# Patient Record
Sex: FEMALE | Race: WHITE | NOT HISPANIC OR LATINO | Employment: OTHER | URBAN - METROPOLITAN AREA
[De-identification: names, ages, dates, MRNs, and addresses within clinical notes are randomized per-mention and may not be internally consistent; named-entity substitution may affect disease eponyms.]

---

## 2021-01-06 ENCOUNTER — TRANSCRIBE ORDERS (OUTPATIENT)
Dept: DIABETES SERVICES | Facility: HOSPITAL | Age: 81
End: 2021-01-06

## 2021-01-06 DIAGNOSIS — E11.8 TYPE 2 DIABETES MELLITUS WITH UNSPECIFIED COMPLICATIONS (HCC): Primary | ICD-10-CM

## 2021-01-27 ENCOUNTER — TRANSCRIBE ORDERS (OUTPATIENT)
Dept: DIABETES SERVICES | Facility: HOSPITAL | Age: 81
End: 2021-01-27

## 2021-01-27 DIAGNOSIS — E11.8 TYPE II DIABETES MELLITUS WITH COMPLICATION (HCC): Primary | ICD-10-CM

## 2021-01-28 ENCOUNTER — IMMUNIZATION (OUTPATIENT)
Dept: VACCINE CLINIC | Facility: HOSPITAL | Age: 81
End: 2021-01-28

## 2021-01-28 PROCEDURE — 0001A: CPT | Performed by: INTERNAL MEDICINE

## 2021-01-28 PROCEDURE — 91300 HC SARSCOV02 VAC 30MCG/0.3ML IM: CPT | Performed by: INTERNAL MEDICINE

## 2021-02-10 ENCOUNTER — APPOINTMENT (OUTPATIENT)
Dept: DIABETES SERVICES | Facility: HOSPITAL | Age: 81
End: 2021-02-10

## 2021-02-18 ENCOUNTER — IMMUNIZATION (OUTPATIENT)
Dept: VACCINE CLINIC | Facility: HOSPITAL | Age: 81
End: 2021-02-18

## 2021-02-18 PROCEDURE — 91300 HC SARSCOV02 VAC 30MCG/0.3ML IM: CPT | Performed by: INTERNAL MEDICINE

## 2021-02-18 PROCEDURE — 0002A: CPT | Performed by: INTERNAL MEDICINE

## 2021-02-25 ENCOUNTER — HOSPITAL ENCOUNTER (OUTPATIENT)
Dept: DIABETES SERVICES | Facility: HOSPITAL | Age: 81
Setting detail: RECURRING SERIES
Discharge: HOME | End: 2021-02-25

## 2021-02-25 NOTE — CONSULTS
DIABETES EDUCATION CONSULT, 60 minutes (phone)  This medical referred consult was provided as a telephone call, tele-health or e-visit, as patient is unable to attend an in-office appointment due to the COVID-19 crisis. Consent for treatment was given verbally.Please see media tab for assessment and notes if you use EP[employer]. If you are not an EP[employer] user a copy of patient's assessment and notes will be sent per routine. Thank you.

## 2021-03-26 ENCOUNTER — HOSPITAL ENCOUNTER (OUTPATIENT)
Dept: DIABETES SERVICES | Facility: HOSPITAL | Age: 81
Setting detail: RECURRING SERIES
Discharge: HOME | End: 2021-03-26

## 2021-03-26 NOTE — CONSULTS
Ms. Anderson attended a 20 minute diabetes follow up class today via telephone.  E[employer] users see Media tab.  All others will receive notes per usual.  Thank you.

## 2021-04-02 ENCOUNTER — TRANSCRIBE ORDERS (OUTPATIENT)
Dept: ADMINISTRATIVE | Facility: HOSPITAL | Age: 81
End: 2021-04-02

## 2021-04-02 ENCOUNTER — LAB (OUTPATIENT)
Dept: LAB | Facility: HOSPITAL | Age: 81
End: 2021-04-02

## 2021-04-02 ENCOUNTER — TRANSCRIBE ORDERS (OUTPATIENT)
Dept: LAB | Facility: HOSPITAL | Age: 81
End: 2021-04-02

## 2021-04-02 DIAGNOSIS — E11.621 DIABETIC FOOT ULCER WITH OSTEOMYELITIS (HCC): ICD-10-CM

## 2021-04-02 DIAGNOSIS — Z71.89 OTHER PARENT-CHILD PROBLEMS: ICD-10-CM

## 2021-04-02 DIAGNOSIS — E11.69 DIABETIC FOOT ULCER WITH OSTEOMYELITIS (HCC): ICD-10-CM

## 2021-04-02 DIAGNOSIS — M86.9 DIABETIC FOOT ULCER WITH OSTEOMYELITIS (HCC): ICD-10-CM

## 2021-04-02 DIAGNOSIS — L03.115 CELLULITIS OF RIGHT FOOT: Primary | ICD-10-CM

## 2021-04-02 DIAGNOSIS — M86.171 ACUTE OSTEOMYELITIS OF RIGHT ANKLE OR FOOT (HCC): ICD-10-CM

## 2021-04-02 DIAGNOSIS — L97.412 ULCER OF RIGHT HEEL, WITH FAT LAYER EXPOSED (HCC): ICD-10-CM

## 2021-04-02 DIAGNOSIS — L03.031 ABSCESS OF FIFTH TOENAIL OF RIGHT FOOT: Primary | ICD-10-CM

## 2021-04-02 DIAGNOSIS — I73.9 PERIPHERAL VASCULAR DISEASE, UNSPECIFIED (HCC): ICD-10-CM

## 2021-04-02 DIAGNOSIS — L97.509 DIABETIC FOOT ULCER WITH OSTEOMYELITIS (HCC): ICD-10-CM

## 2021-04-02 DIAGNOSIS — L03.115 CELLULITIS OF RIGHT FOOT: ICD-10-CM

## 2021-04-02 DIAGNOSIS — L03.031 ABSCESS OF FIFTH TOENAIL OF RIGHT FOOT: ICD-10-CM

## 2021-04-02 DIAGNOSIS — Z62.820 OTHER PARENT-CHILD PROBLEMS: ICD-10-CM

## 2021-04-02 DIAGNOSIS — E11.42 DIABETIC POLYNEUROPATHY ASSOCIATED WITH TYPE 2 DIABETES MELLITUS (HCC): ICD-10-CM

## 2021-04-02 LAB
ALBUMIN SERPL-MCNC: 3.1 G/DL (ref 3.5–5.2)
ALBUMIN/GLOB SERPL: 1 G/DL
ALP SERPL-CCNC: 100 U/L (ref 39–117)
ALT SERPL W P-5'-P-CCNC: 36 U/L (ref 1–33)
ANION GAP SERPL CALCULATED.3IONS-SCNC: 10 MMOL/L (ref 5–15)
AST SERPL-CCNC: 33 U/L (ref 1–32)
BASOPHILS # BLD AUTO: 0.04 10*3/MM3 (ref 0–0.2)
BASOPHILS NFR BLD AUTO: 0.4 % (ref 0–1.5)
BILIRUB SERPL-MCNC: 0.4 MG/DL (ref 0–1.2)
BUN SERPL-MCNC: 50 MG/DL (ref 8–23)
BUN/CREAT SERPL: 37.3 (ref 7–25)
CALCIUM SPEC-SCNC: 8.2 MG/DL (ref 8.6–10.5)
CHLORIDE SERPL-SCNC: 103 MMOL/L (ref 98–107)
CK SERPL-CCNC: 835 U/L (ref 20–180)
CO2 SERPL-SCNC: 21 MMOL/L (ref 22–29)
CREAT SERPL-MCNC: 1.34 MG/DL (ref 0.57–1)
CRP SERPL-MCNC: 17.83 MG/DL (ref 0–0.5)
DEPRECATED RDW RBC AUTO: 49.4 FL (ref 37–54)
EOSINOPHIL # BLD AUTO: 0.02 10*3/MM3 (ref 0–0.4)
EOSINOPHIL NFR BLD AUTO: 0.2 % (ref 0.3–6.2)
ERYTHROCYTE [DISTWIDTH] IN BLOOD BY AUTOMATED COUNT: 13.8 % (ref 12.3–15.4)
ERYTHROCYTE [SEDIMENTATION RATE] IN BLOOD: 40 MM/HR (ref 0–30)
GFR SERPL CREATININE-BSD FRML MDRD: 38 ML/MIN/1.73
GLOBULIN UR ELPH-MCNC: 3 GM/DL
GLUCOSE SERPL-MCNC: 324 MG/DL (ref 65–99)
HCT VFR BLD AUTO: 33.9 % (ref 34–46.6)
HGB BLD-MCNC: 10.9 G/DL (ref 12–15.9)
IMM GRANULOCYTES # BLD AUTO: 0.04 10*3/MM3 (ref 0–0.05)
IMM GRANULOCYTES NFR BLD AUTO: 0.4 % (ref 0–0.5)
LYMPHOCYTES # BLD AUTO: 1.31 10*3/MM3 (ref 0.7–3.1)
LYMPHOCYTES NFR BLD AUTO: 11.5 % (ref 19.6–45.3)
MCH RBC QN AUTO: 31.2 PG (ref 26.6–33)
MCHC RBC AUTO-ENTMCNC: 32.2 G/DL (ref 31.5–35.7)
MCV RBC AUTO: 97.1 FL (ref 79–97)
MONOCYTES # BLD AUTO: 1.07 10*3/MM3 (ref 0.1–0.9)
MONOCYTES NFR BLD AUTO: 9.4 % (ref 5–12)
NEUTROPHILS NFR BLD AUTO: 78.1 % (ref 42.7–76)
NEUTROPHILS NFR BLD AUTO: 8.93 10*3/MM3 (ref 1.7–7)
NRBC BLD AUTO-RTO: 0 /100 WBC (ref 0–0.2)
PLATELET # BLD AUTO: 202 10*3/MM3 (ref 140–450)
PMV BLD AUTO: 10.9 FL (ref 6–12)
POTASSIUM SERPL-SCNC: 4.9 MMOL/L (ref 3.5–5.2)
PROT SERPL-MCNC: 6.1 G/DL (ref 6–8.5)
RBC # BLD AUTO: 3.49 10*6/MM3 (ref 3.77–5.28)
SODIUM SERPL-SCNC: 134 MMOL/L (ref 136–145)
WBC # BLD AUTO: 11.41 10*3/MM3 (ref 3.4–10.8)

## 2021-04-02 PROCEDURE — 87070 CULTURE OTHR SPECIMN AEROBIC: CPT

## 2021-04-02 PROCEDURE — 87205 SMEAR GRAM STAIN: CPT

## 2021-04-02 PROCEDURE — 85025 COMPLETE CBC W/AUTO DIFF WBC: CPT

## 2021-04-02 PROCEDURE — 87147 CULTURE TYPE IMMUNOLOGIC: CPT

## 2021-04-02 PROCEDURE — 80053 COMPREHEN METABOLIC PANEL: CPT

## 2021-04-02 PROCEDURE — 86140 C-REACTIVE PROTEIN: CPT

## 2021-04-02 PROCEDURE — 82550 ASSAY OF CK (CPK): CPT

## 2021-04-02 PROCEDURE — 85652 RBC SED RATE AUTOMATED: CPT

## 2021-04-02 PROCEDURE — 36415 COLL VENOUS BLD VENIPUNCTURE: CPT

## 2021-04-03 ENCOUNTER — HOSPITAL ENCOUNTER (OUTPATIENT)
Dept: MRI IMAGING | Facility: HOSPITAL | Age: 81
Discharge: HOME | End: 2021-04-03
Admitting: INTERNAL MEDICINE

## 2021-04-03 DIAGNOSIS — L03.115 CELLULITIS OF RIGHT FOOT: ICD-10-CM

## 2021-04-03 PROCEDURE — 73720 MRI LWR EXTREMITY W/O&W/DYE: CPT

## 2021-04-03 PROCEDURE — A9577 INJ MULTIHANCE: HCPCS | Performed by: INTERNAL MEDICINE

## 2021-04-03 PROCEDURE — 0 GADOBENATE DIMEGLUMINE 529 MG/ML SOLUTION: Performed by: INTERNAL MEDICINE

## 2021-04-03 RX ADMIN — GADOBENATE DIMEGLUMINE 15 ML: 529 INJECTION, SOLUTION INTRAVENOUS at 12:08

## 2021-04-04 LAB
BACTERIA SPEC AEROBE CULT: ABNORMAL
BACTERIA SPEC AEROBE CULT: ABNORMAL
GRAM STN SPEC: ABNORMAL
GRAM STN SPEC: ABNORMAL
STREP GROUPING: ABNORMAL

## 2021-04-05 ENCOUNTER — HOSPITAL ENCOUNTER (OUTPATIENT)
Dept: PHYSICAL THERAPY | Facility: HOSPITAL | Age: 81
Setting detail: THERAPIES SERIES
Discharge: HOME | End: 2021-04-05

## 2021-04-05 ENCOUNTER — TRANSCRIBE ORDERS (OUTPATIENT)
Dept: PHYSICAL THERAPY | Facility: HOSPITAL | Age: 81
End: 2021-04-05

## 2021-04-05 DIAGNOSIS — L03.115 CELLULITIS OF RIGHT LOWER EXTREMITY: Primary | ICD-10-CM

## 2021-04-05 DIAGNOSIS — S91.301D OPEN WOUND OF RIGHT FOOT WITH COMPLICATION, SUBSEQUENT ENCOUNTER: Primary | ICD-10-CM

## 2021-04-05 PROCEDURE — 97597 DBRDMT OPN WND 1ST 20 CM/<: CPT

## 2021-04-05 PROCEDURE — 29580 STRAPPING UNNA BOOT: CPT

## 2021-04-05 PROCEDURE — 97162 PT EVAL MOD COMPLEX 30 MIN: CPT

## 2021-04-05 NOTE — THERAPY EVALUATION
Outpatient Rehabilitation - Wound/Debridement Initial Eval  [facility]     Patient Name: Susan Anderson  : 1940  MRN: [id number]  Today's Date: 2021         R first metatarsal head:             Admit Date: 2021    Visit Dx:    ICD-10-CM ICD-9-CM   1. Open wound of right foot with complication, subsequent encounter  S91.301D V58.89     892.1       There is no problem list on file for this patient.       No past medical history on file.     No past surgical history on file.    Patient History     Row Name 21 1430             History    Chief Complaint  Ulcer, wound or other skin conditions  -MP      Date Current Problem(s) Began  03/15/21  -MP      Brief Description of Current Complaint  Patient arrives to OP wound care with wound on R first met head that occured on 3/15/2021 from a skin tear. Patient states she has been on multiple oral abx but is currently being managed by [facility] for daily abx infusions. Patient states that MRI results potential bone involvement.  -MP      Previous treatment for THIS PROBLEM  Medication;Other (comment) oral and IV abx  -MP      Patient/Caregiver Goals  Heal wound  -MP      Patient's Rating of General Health  Fair  -MP      Patient seeing anyone else for problem(s)?  [facility]- Dr. Pete  -MP      What clinical tests have you had for this problem?  MRI  -MP      Related/Recent Hospitalizations  No  -MP         Pain     Pain at Present  0  -MP         Services    Are you currently receiving Home Health services  No  -MP      Do you plan to receive Home Health services in the near future  No  -MP         Daily Activities    Primary Language  English  -MP      Pt Participated in POC and Goals  Yes  -MP        User Key  (r) = Recorded By, (t) = Taken By, (c) = Cosigned By    Initials Name Provider Type    Adalid Dao PT Physical Therapist          EVALUATION  PT Ortho     Row Name 21 3405       Subjective Comments    Subjective Comments  Patient's  daughter states that she can help with dressing management if needed. Pt states she is [place].  -MP       Subjective Pain    Able to rate subjective pain?  yes  -MP    Pre-Treatment Pain Level  0  -MP    Post-Treatment Pain Level  0  -MP    Subjective Pain Comment  DPN  -MP       Transfers    Comment (Transfers)  remained seated in w/c  -MP      User Key  (r) = Recorded By, (t) = Taken By, (c) = Cosigned By    Initials Name Provider Type    Adalid Dao PT Physical Therapist        LDA Wound     Row Name 04/05/21 1430             Wound 04/05/21 1430 Right medial great toe Diabetic Ulcer    Wound - Properties Group Placement Date: 04/05/21  -MP Placement Time: 1430  -MP Present on Hospital Admission: Y  -MP Side: Right  -MP Orientation: medial  -MP Location: great toe  -MP Primary Wound Type: Diabetic ulc  -MP    Wound Image  Images linked: 2  -MP      Dressing Appearance  intact;moist drainage;other (see comments) arrived with 4'' optifoam covering  -MP      Base  moist;pink;red;yellow;slough;black eschar  -MP      Periwound  pink;redness;other (see comments) moderate erythema  -MP      Periwound Temperature  warm  -MP      Periwound Skin Turgor  firm  -MP      Edges  callused;open  -MP      Wound Length (cm)  2 cm  -MP      Wound Width (cm)  1.9 cm  -MP      Wound Depth (cm)  -- obscured  -MP      Drainage Characteristics/Odor  serosanguineous  -MP      Drainage Amount  scant  -MP      Care, Wound  cleansed with;wound cleanser;debrided  -MP      Dressing Care  dressing applied;antimicrobial agent applied;multi-layer wrap Therahoney, HFBt, unna boot, kerlix, spandage  -MP      Periwound Care  cleansed with pH balanced cleanser;dry periwound area maintained  -MP      Retired Wound - Properties Group Date first assessed: 04/05/21  -MP Time first assessed: 1430  -MP Present on Hospital Admission: Y  -MP Side: Right  -MP Location: great toe  -MP Primary Wound Type: Diabetic ulc  -MP      User Key  (r) = Recorded By,  (t) = Taken By, (c) = Cosigned By    Initials Name Provider Type    Adalid Dao, PT Physical Therapist        Lymphedema     Row Name 04/05/21 1430             Lymphedema Edema Assessment    Ptting Edema Category  By severity  -MP      Pitting Edema  Mild  -MP         Skin Changes/Observations    Location/Assessment  Lower Extremity  -MP      Lower Extremity Conditions  right:;inflamed;other (comment) R dorsal foot erythema/inflammation noted  -MP         Lymphedema Pulses/Capillary Refill    Lymphedema Pulses/Capillary Refill  lower extremity pulses;capillary refill  -MP      Dorsalis Pedis Pulse  right: difficult to palpate through edema  -MP      Posterior Tibialis Pulse  right: difficult to palpate through edema  -MP      Capillary Refill  lower extremity capillary refill  -MP      Lower Extremity Capillary Refill  right:;less than 3 seconds  -MP         Compression/Skin Care    Compression/Skin Care  skin care;wrapping location;bandaging  -MP      Skin Care  washed/dried  -MP      Wrapping Location  lower extremity  -MP      Wrapping Location LE  right:;foot to knee  -MP      Wrapping Comments  Wound dressings, unna boot, kerlix, size 5 spandage  -MP        User Key  (r) = Recorded By, (t) = Taken By, (c) = Cosigned By    Initials Name Provider Type    Adalid Dao, PT Physical Therapist          WOUND DEBRIDEMENT  Total area of Debridement: 2 cm2  Debridement Site 1  Location- Site 1: R first metatarsal head  Selective Debridement- Site 1: Wound Surface <20cmsq  Instruments- Site 1: #15, scapel, tweezers  Excised Tissue Description- Site 1: minimum, slough, eschar, other (comment) (periwound callus)             Therapy Education     Row Name 04/05/21 1430             Therapy Education    Education Details  Patient and daughter provided information about dressing choice/rationale and importance of keeping wound dressings C/D/I until next appt. PT reinforced importance of NWB R LE and R LE elevation.   -MP      Given  Symptoms/condition management;Bandaging/dressing change  -MP      Program  New  -MP      How Provided  Verbal;Demonstration  -MP      Provided to  Patient;Other (comment) Daughter  -MP        User Key  (r) = Recorded By, (t) = Taken By, (c) = Cosigned By    Initials Name Provider Type    Adalid Dao, PT Physical Therapist          Recommendation and Plan  PT Assessment/Plan     Row Name 21 1430          PT Assessment    Functional Limitations  Impaired locomotion;Other (comment) wound and edema management  -MP     Impairments  Pain;Integumentary integrity  -MP     Assessment Comments  Patient presents to OP wound care with complex wound to R first metatarsal head with R foot cellulitis. Per discussion with MD, MRI showed potential bone involvement. PT was able to debride minimal amount of slough and eschar but depth remains obscured. PT dressed wound with Therahoney and HFBt for slough and eschar management. Unna boot applied in clam shell application from dorsum of foot to knee with light compression added with kerlix/spandage; pt unable to tolerate increased compression at this time. Patient would continue to benefit from skilled PT wound care to promote increased wound healing potential.  -MP     Rehab Potential  Fair  -MP     Patient/caregiver participated in establishment of treatment plan and goals  Yes  -MP     Patient would benefit from skilled therapy intervention  Yes  -MP        PT Plan    PT Frequency  2x/week  -MP     Predicted Duration of Therapy Intervention (PT)  25 visits  -MP     Planned CPT's?  PT EVAL MOD COMPLELITY: 38877;PT NONSELECT DEBRIDE 15 MIN: 87179;PT PIPE DEBRIDE OPEN WOUND UP TO 20 CM: 70319;PT NLFU MIST: 71674;PT UNNA BOOT: 80389;PT MULTI LAYER COMP SYS LE  -MP     Physical Therapy Interventions (Optional Details)  patient/family education;wound care  -MP     PT Plan Comments  Debridement, unna boot, education, ?MIST if indicated.  -MP       User Key   (r) = Recorded By, (t) = Taken By, (c) = Cosigned By    Initials Name Provider Type    Adalid Dao PT Physical Therapist            Goals  PT OP Goals     Row Name 04/05/21 1430          PT Short Term Goals    STG 1  Patient will verbalize signs and symptoms of infection.  -MP     STG 1 Progress  New  -MP     STG 2  Patient will present with a 25% reduction in R great toe wound as evidence of wound healing.   -MP     STG 2 Progress  New  -MP        Long Term Goals    LTG 1  Patient will present with a 75% reduction in R great toe wound as evidence of wound healing.   -MP     LTG 1 Progress  New  -MP     LTG 2  Patient will demonstrate independence with clean home dressing management.  -MP        Time Calculation    PT Goal Re-Cert Due Date  07/04/21  -MP       User Key  (r) = Recorded By, (t) = Taken By, (c) = Cosigned By    Initials Name Provider Type    Adalid Dao PT Physical Therapist          Time Calculation: Start Time: 1430  Therapy Charges for Today     Code Description Service Date Service Provider Modifiers Qty    71621215965 HC PT EVAL MOD COMPLEXITY 4 4/5/2021 Adalid Braga, PT GP 1    01071313478 HC PIPE DEBRIDE OPEN WOUND UP TO 20CM 4/5/2021 Adalid Braga, PT GP 1    [id number]  PT STAPPING UNNA BOOT 4/5/2021 Adalid Braga, PT GP 1                Adalid Braga PT  4/5/2021

## 2021-04-07 ENCOUNTER — HOSPITAL ENCOUNTER (OUTPATIENT)
Dept: PHYSICAL THERAPY | Facility: HOSPITAL | Age: 81
Setting detail: THERAPIES SERIES
Discharge: HOME | End: 2021-04-07

## 2021-04-07 ENCOUNTER — HOSPITAL ENCOUNTER (OUTPATIENT)
Dept: CARDIOLOGY | Facility: HOSPITAL | Age: 81
Discharge: HOME | End: 2021-04-07
Admitting: PHYSICIAN ASSISTANT

## 2021-04-07 ENCOUNTER — OFFICE VISIT (OUTPATIENT)
Dept: CARDIAC SURGERY | Facility: CLINIC | Age: 81
End: 2021-04-07

## 2021-04-07 VITALS
BODY MASS INDEX: 30.22 KG/M2 | TEMPERATURE: 98.4 F | HEIGHT: 64 IN | WEIGHT: 177 LBS | OXYGEN SATURATION: 99 % | DIASTOLIC BLOOD PRESSURE: 72 MMHG | HEART RATE: 74 BPM | SYSTOLIC BLOOD PRESSURE: 122 MMHG

## 2021-04-07 DIAGNOSIS — I73.9 PVD (PERIPHERAL VASCULAR DISEASE) (HCC): Primary | ICD-10-CM

## 2021-04-07 DIAGNOSIS — I96 GANGRENE OF FOOT (HCC): ICD-10-CM

## 2021-04-07 DIAGNOSIS — I96 GANGRENE OF EXTREMITY (HCC): ICD-10-CM

## 2021-04-07 DIAGNOSIS — E11.42 DIABETIC PERIPHERAL NEUROPATHY ASSOCIATED WITH TYPE 2 DIABETES MELLITUS (HCC): ICD-10-CM

## 2021-04-07 DIAGNOSIS — S91.301D OPEN WOUND OF RIGHT FOOT WITH COMPLICATION, SUBSEQUENT ENCOUNTER: Primary | ICD-10-CM

## 2021-04-07 LAB
BH CV LOWER ARTERIAL LEFT ABI RATIO: 1.34
BH CV LOWER ARTERIAL LEFT GREAT TOE SYS MAX: 57 MMHG
BH CV LOWER ARTERIAL LEFT LOW THIGH SYS MAX: 189 MMHG
BH CV LOWER ARTERIAL LEFT TBI RATIO: 0.4
BH CV LOWER ARTERIAL RIGHT ABI RATIO: 0.92
BH CV LOWER ARTERIAL RIGHT DORSALIS PEDIS SYS MAX: 129 MMHG
BH CV LOWER ARTERIAL RIGHT GREAT TOE SYS MAX: 22 MMHG
BH CV LOWER ARTERIAL RIGHT LOW THIGH SYS MAX: 180 MMHG
BH CV LOWER ARTERIAL RIGHT POPLITEAL SYS MAX: 177 MMHG
BH CV LOWER ARTERIAL RIGHT POST TIBIAL SYS MAX: 130 MMHG
BH CV LOWER ARTERIAL RIGHT TBI RATIO: 0.16
UPPER ARTERIAL LEFT ARM BRACHIAL SYS MAX: 141 MMHG
UPPER ARTERIAL RIGHT ARM BRACHIAL SYS MAX: 138 MMHG

## 2021-04-07 PROCEDURE — 93923 UPR/LXTR ART STDY 3+ LVLS: CPT | Performed by: INTERNAL MEDICINE

## 2021-04-07 PROCEDURE — 93923 UPR/LXTR ART STDY 3+ LVLS: CPT

## 2021-04-07 PROCEDURE — 97597 DBRDMT OPN WND 1ST 20 CM/<: CPT

## 2021-04-07 PROCEDURE — 99203 OFFICE O/P NEW LOW 30 MIN: CPT | Performed by: THORACIC SURGERY (CARDIOTHORACIC VASCULAR SURGERY)

## 2021-04-07 RX ORDER — CHLORAL HYDRATE 500 MG
CAPSULE ORAL
Status: ON HOLD | COMMUNITY
End: 2021-04-08

## 2021-04-07 RX ORDER — TRAMADOL HYDROCHLORIDE 50 MG/1
TABLET ORAL
COMMUNITY
End: 2021-04-22 | Stop reason: HOSPADM

## 2021-04-07 RX ORDER — METRONIDAZOLE 500 MG/1
TABLET ORAL 3 TIMES DAILY
COMMUNITY
Start: 2021-04-02 | End: 2021-04-22 | Stop reason: HOSPADM

## 2021-04-07 RX ORDER — PREGABALIN 100 MG/1
CAPSULE ORAL EVERY 8 HOURS SCHEDULED
Status: ON HOLD | COMMUNITY
Start: 2021-01-05 | End: 2021-04-22 | Stop reason: SDUPTHER

## 2021-04-07 RX ORDER — TRIAMTERENE AND HYDROCHLOROTHIAZIDE 37.5; 25 MG/1; MG/1
TABLET ORAL
COMMUNITY
Start: 2021-01-14 | End: 2021-04-22 | Stop reason: HOSPADM

## 2021-04-07 RX ORDER — ALLOPURINOL 300 MG/1
TABLET ORAL
COMMUNITY
Start: 2020-12-30 | End: 2021-06-26 | Stop reason: HOSPADM

## 2021-04-07 RX ORDER — ONDANSETRON 4 MG/1
TABLET, ORALLY DISINTEGRATING ORAL
COMMUNITY
Start: 2021-04-02 | End: 2021-08-19

## 2021-04-07 NOTE — PROGRESS NOTES
04/07/2021  Patient Information  Susan Anderson                                                                                          [address] [place] [address]   1940  'PCP/Referring Physician'  Shaq Ordoñez MD  [phone]  Stef Pete MD  [phone]  Chief Complaint   Patient presents with   • Consult     New patient per Dr. Blunt for acute osteo/ cellulitis of the right foot w/ ulceration. Pt'darrell navarro states that on March 13 2020 after being in [place]. she had a small ulceration on her foot, saw ID on Friday to which Dr. Finney was consulted on Sat. April 3rd. Here today for eval of her right foot.        History of Present Illness: Patient is an 81-year-old female referred to me at this time by Dr. Stef Alvarado for further evaluation of a 3-week history of a right great toe plantar surface ulceration.  Patient initially both been treated by podiatrist Dr. Lam over 3 weeks ago with a callus over this plantar surface area with shavings of a plantar surface callus.  The patient developed some drainage from this area and wasreferred to Dr. Stef Pete infectious disease.  Patient had a swab of the area growing 4+ Streptococcus agalactiae.  Patient was seen by me in Dr. Pete s office on 2 occasions for wound check and I noticed that this wound has been increasing in severity in terms of cellulitis on the top of the foot as well as some drainage and worsening of the plantar surface ulceration from an ischemic standpoint.  I told Dr. Pete she should be seen by me formally in the consultation mode which she comes in today to see me.  She is a longstanding diabetic.      There is no problem list on file for this patient.    Past Medical History:   Diagnosis Date   • Cancer (CMS/HCC)    • Diabetes mellitus (CMS/HCC)    • Dyslipidemia    • Hypertension      Past Surgical History:   Procedure Laterality Date   • ABDOMINAL HYSTERECTOMY W/SALPINGECTOMY     • APPENDECTOMY     •  CATARACT EXTRACTION W/ INTRAOCULAR LENS  IMPLANT, BILATERAL     • CHOLECYSTECTOMY         Current Outpatient Medications:   •  allopurinol (ZYLOPRIM) 300 MG tablet, Take 1 tablet every day by oral route., Disp: , Rfl:   •  Insulin Lispro Prot & Lispro (HUMALOG MIX 75/25 KWIKPEN SC), Inject  under the skin into the appropriate area as directed. humalog 70/30 sliding scale, Disp: , Rfl:   •  metroNIDAZOLE (FLAGYL) 500 MG tablet, , Disp: , Rfl:   •  Omega-3 Fatty Acids (fish oil) 1000 MG capsule capsule, , Disp: , Rfl:   •  ondansetron ODT (ZOFRAN-ODT) 4 MG disintegrating tablet, , Disp: , Rfl:   •  pregabalin (Lyrica) 100 MG capsule, Every 8 (Eight) Hours., Disp: , Rfl:   •  traMADol (ULTRAM) 50 MG tablet, Take 1 tablet as needed by oral route for 30 days., Disp: , Rfl:   •  triamterene-hydrochlorothiazide (MAXZIDE-25) 37.5-25 MG per tablet, , Disp: , Rfl:   Allergies   Allergen Reactions   • Cephalexin Nausea Only   • Erythromycin Base Nausea Only   • Oxycodone Nausea Only   • Sulfa Antibiotics Nausea Only     Social History     Socioeconomic History   • Marital status:      Spouse name: Not on file   • Number of children: 1   • Years of education: Not on file   • Highest education level: Not on file   Tobacco Use   • Smoking status: Never Smoker   • Smokeless tobacco: Never Used   Vaping Use   • Vaping Use: Never used   Substance and Sexual Activity   • Alcohol use: Yes     Comment: very seldom    • Sexual activity: Defer     Family History   Problem Relation Age of Onset   • Diabetes Mother    • Heart attack Father    • Coronary artery disease Father    • Diabetes Father      Review of Systems   Constitutional: Negative for chills, fever, malaise/fatigue, night sweats and weight loss.   HENT: Positive for congestion (sinus allergeries). Negative for nosebleeds and odynophagia. 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    Cardiovascular: Positive for dyspnea on exertion. Negative for chest pain,  "claudication, leg swelling (right leg and foot), orthopnea, palpitations and syncope.   Respiratory: Negative for cough, hemoptysis, shortness of breath and wheezing.    Endocrine: Negative for cold intolerance, heat intolerance, polydipsia, polyphagia and polyuria.   Hematologic/Lymphatic: Bruises/bleeds easily.   Skin: Positive for poor wound healing. Negative for itching and rash.   Musculoskeletal: Positive for arthritis and back pain. Negative for joint pain, joint swelling and myalgias.   Gastrointestinal: Positive for diarrhea. Negative for abdominal pain, constipation, hematemesis, melena, nausea and vomiting.   Genitourinary: Positive for frequency and nocturia. Negative for dysuria, hematuria and urgency.   Neurological: Positive for dizziness, loss of balance and numbness (neurapathy bilat feet). Negative for light-headedness.   Psychiatric/Behavioral: Negative for depression and suicidal ideas. The patient has insomnia. The patient is not nervous/anxious.    Allergic/Immunologic: Positive for environmental allergies. Negative for HIV exposure.     Vitals:    04/07/21 1052   BP: 122/72   Pulse: 74   Temp: 98.4 °F (36.9 °C)   SpO2: 99%   Weight: 80.3 kg (177 lb)   Height: 162.6 cm (64\")      Physical Exam  Constitutional:       Appearance: Normal appearance.   HENT:      Head: Normocephalic.   Eyes:      Extraocular Movements: Extraocular movements intact.      Pupils: Pupils are equal, round, and reactive to light.   Neck:      Vascular: No carotid bruit.   Cardiovascular:      Rate and Rhythm: Normal rate and regular rhythm.      Comments: Patient did not have any palpable pedal pulses in either foot.  Patient did have palpable popliteal pulses bilaterally.  Patient had very muted dopplerable pedal pulses in both feet.  Patient did have palpable femoral pulses bilaterally  Pulmonary:      Effort: Pulmonary effort is normal.      Breath sounds: Normal breath sounds.   Abdominal:      General: There is no " distension.      Palpations: Abdomen is soft. There is no mass.      Tenderness: There is no abdominal tenderness. There is no right CVA tenderness, left CVA tenderness, guarding or rebound.      Hernia: No hernia is present.   Musculoskeletal:         General: Normal range of motion.      Cervical back: Normal range of motion and neck supple. No rigidity or tenderness.   Lymphadenopathy:      Cervical: No cervical adenopathy.   Skin:     General: Skin is warm.      Capillary Refill: Capillary refill takes more than 3 seconds.      Findings: Erythema and lesion present.      Comments: Patient had a plantar surface ulceration over the right great toe metatarsal head.  It was necrotic soft tissue present and surrounding erythema around the ulcer but especially on the dorsum of the foot extending toward the mid dorsal area.   Neurological:      General: No focal deficit present.      Mental Status: She is alert and oriented to person, place, and time.   Psychiatric:         Mood and Affect: Mood normal.         Behavior: Behavior normal.         Thought Content: Thought content normal.         Judgment: Judgment normal.         The ROS, past medical history, surgical history, family history, social history and vitals were reviewed by myself and corrected as needed.      Labs/Imaging: I did review the MRI of the right foot with Luis shin.  No direct evidence by MRI criteria of osteomyelitis of the right great toe or metatarsal head area.  There was swelling noted in the ulceration was also noted.    Assessment/Plan: #1.  3-week history of progressive ulceration of the plantar surface of the right great toe metatarsal head with erythema and some discharge and culture growing 4+ Streptococcus agalactiae.  2.  Peripheral vascular disease by clinical examination of absent palpable pedal pulses.  And muffled dopplerable pedal pulses.  Patient does have palpable popliteal pulses.  This therefore makes me suspect  tibial vessel disease that would be typical for a diabetic.  3.  Diabetes mellitus with severe peripheral neuropathy and peripheral vascular disease as noted above.    Plan we are going to order a arterial duplex of both lower extremities today and pending these results we will plan early next week aortogram with runoff most likely a CO2 angiogram since she probably will have a fair amount of renal insufficiency due to her underlying diabetes.  Patient understands our plan and agrees to the angiogram.    There is no problem list on file for this patient.

## 2021-04-07 NOTE — THERAPY WOUND CARE TREATMENT
Outpatient Rehabilitation - Wound/Debridement Treatment Note  [facility]     Patient Name: Susan Anderson  : 1940  MRN: [id number]  Today's Date: 2021                 Admit Date: 2021    Visit Dx:    ICD-10-CM ICD-9-CM   1. Open wound of right foot with complication, subsequent encounter  S91.301D V58.89     892.1       There is no problem list on file for this patient.       Past Medical History:   Diagnosis Date   • Cancer (CMS/HCC)    • Diabetes mellitus (CMS/HCC)    • Dyslipidemia    • Hypertension         Past Surgical History:   Procedure Laterality Date   • ABDOMINAL HYSTERECTOMY W/SALPINGECTOMY     • APPENDECTOMY     • BRAIN TUMOR EXCISION      laser surgery    • CATARACT EXTRACTION W/ INTRAOCULAR LENS  IMPLANT, BILATERAL     • CHOLECYSTECTOMY           EVALUATION  PT Ortho     Row Name 21 142       Subjective Comments    Subjective Comments  Patient and daughter state that patient will likely need an amputation of R first metatarsal but would like to continue current POC until surgery is officially scheduled.   -MP       Subjective Pain    Able to rate subjective pain?  yes  -MP    Pre-Treatment Pain Level  0  -MP    Post-Treatment Pain Level  0  -MP    Subjective Pain Comment  DPN  -MP       Transfers    Comment (Transfers)  remained seated in w/c  -MP      User Key  (r) = Recorded By, (t) = Taken By, (c) = Cosigned By    Initials Name Provider Type    Adalid Dao PT Physical Therapist          [facility] Wound     Row Name 21 1420             Wound 21 1430 Right medial great toe Diabetic Ulcer    Wound - Properties Group Placement Date: 21  -MP Placement Time: 1430  -MP Present on Hospital Admission: Y  -MP Side: Right  -MP Orientation: medial  -MP Location: great toe  -MP Primary Wound Type: Diabetic ulc  -MP    Dressing Appearance  other (see comments) secured with temporary dressing from MD's office  -MP      Base  moist;pink;red;yellow;slough;black eschar   -MP      Periwound  pink;redness;other (see comments) moderate erythema  -MP      Periwound Temperature  warm  -MP      Periwound Skin Turgor  firm  -MP      Edges  callused;open  -MP      Drainage Characteristics/Odor  serosanguineous  -MP      Drainage Amount  scant  -MP      Care, Wound  cleansed with;wound cleanser;debrided  -MP      Dressing Care  dressing applied;silver impregnated;foam Therahoney, HFBt, unna boot wrapped around met.heads  -MP      Periwound Care  cleansed with pH balanced cleanser;dry periwound area maintained  -MP      Retired Wound - Properties Group Date first assessed: 04/05/21  -MP Time first assessed: 1430  -MP Present on Hospital Admission: Y  -MP Side: Right  -MP Location: great toe  -MP Primary Wound Type: Diabetic ulc  -MP      User Key  (r) = Recorded By, (t) = Taken By, (c) = Cosigned By    Initials Name Provider Type    Adalid Dao, PT Physical Therapist            WOUND DEBRIDEMENT                   Therapy Education     Row Name 04/07/21 1420             Therapy Education    Education Details  PT explained that we will continue to see patient 1x per week for dressing management pending pt need for amputation. PT explained that we will focus on dressing management to decrease risk of infection if amputation is necessary.  -MP      Given  Symptoms/condition management;Bandaging/dressing change  -MP      Program  New  -MP      How Provided  Verbal;Demonstration  -MP      Provided to  Patient;Other (comment) Daughter  -MP        User Key  (r) = Recorded By, (t) = Taken By, (c) = Cosigned By    Initials Name Provider Type    Adalid Dao, PT Physical Therapist          Recommendation and Plan  PT Assessment/Plan     Row Name 04/07/21 1430          PT Assessment    Functional Limitations  Impaired locomotion;Other (comment) wound and edema management  -MP     Impairments  Pain;Integumentary integrity  -MP     Assessment Comments  Patient arrives to clinic stating that R  first metatarsal amputation is likely but MD would still like patient to attend wound care at this point in time. Patient spent increased time teaching family how to perform dressing changes at home to promote independence with POC; pt verbalized understanding. PT lightly debrided eschar from wound bed. Noted increase in erythema to R dorsal foot. Patient would continue to benefit from weekly wound care appts for dressing management.  -MP     Rehab Potential  Fair  -MP     Patient/caregiver participated in establishment of treatment plan and goals  Yes  -MP     Patient would benefit from skilled therapy intervention  Yes  -MP        PT Plan    PT Frequency  2x/week  -MP     Physical Therapy Interventions (Optional Details)  patient/family education;wound care  -MP     PT Plan Comments  debridement, education  -MP       User Key  (r) = Recorded By, (t) = Taken By, (c) = Cosigned By    Initials Name Provider Type    Adalid Doa PT Physical Therapist          Goals  PT OP Goals     Row Name 04/07/21 1430          Time Calculation    PT Goal Re-Cert Due Date  07/04/21  -MP       User Key  (r) = Recorded By, (t) = Taken By, (c) = Cosigned By    Initials Name Provider Type    Adalid Dao PT Physical Therapist          PT Goal Re-Cert Due Date: 07/04/21            Time Calculation: Start Time: 1430  Therapy Charges for Today     Code Description Service Date Service Provider Modifiers Qty    [id number] HC PIPE DEBRIDE OPEN WOUND UP TO 20CM 4/7/2021 Adalid Braga, PT GP 1                  Adalid Braga PT  4/7/2021

## 2021-04-08 ENCOUNTER — HOSPITAL ENCOUNTER (INPATIENT)
Facility: HOSPITAL | Age: 81
LOS: 14 days | Discharge: NURSING, REHAB OR LONG-TERM CARE | End: 2021-04-22
Attending: INTERNAL MEDICINE | Admitting: INTERNAL MEDICINE

## 2021-04-08 ENCOUNTER — APPOINTMENT (OUTPATIENT)
Dept: PHYSICAL THERAPY | Facility: HOSPITAL | Age: 81
End: 2021-04-08

## 2021-04-08 DIAGNOSIS — I73.9 PVD (PERIPHERAL VASCULAR DISEASE) (HCC): Primary | ICD-10-CM

## 2021-04-08 DIAGNOSIS — M86.00 ACUTE HEMATOGENOUS OSTEOMYELITIS, UNSPECIFIED SITE (HCC): ICD-10-CM

## 2021-04-08 DIAGNOSIS — M86.171 OTHER ACUTE OSTEOMYELITIS OF RIGHT FOOT (HCC): Primary | ICD-10-CM

## 2021-04-08 DIAGNOSIS — M86.071 ACUTE HEMATOGENOUS OSTEOMYELITIS OF RIGHT FOOT (HCC): ICD-10-CM

## 2021-04-08 PROBLEM — M86.9 OSTEOMYELITIS (HCC): Status: ACTIVE | Noted: 2021-04-08

## 2021-04-08 PROBLEM — E11.40 DIABETES MELLITUS WITH NEUROPATHY: Status: ACTIVE | Noted: 2021-04-08

## 2021-04-08 PROBLEM — N18.30 CKD (CHRONIC KIDNEY DISEASE), STAGE III: Status: ACTIVE | Noted: 2021-04-08

## 2021-04-08 PROBLEM — E11.621 DIABETIC FOOT ULCER: Status: ACTIVE | Noted: 2021-04-08

## 2021-04-08 PROBLEM — L97.509 DIABETIC FOOT ULCER: Status: ACTIVE | Noted: 2021-04-08

## 2021-04-08 PROBLEM — I10 ESSENTIAL HYPERTENSION: Status: ACTIVE | Noted: 2021-04-08

## 2021-04-08 LAB
ABO GROUP BLD: NORMAL
ABO GROUP BLD: NORMAL
ALBUMIN SERPL-MCNC: 3 G/DL (ref 3.5–5.2)
ALBUMIN/GLOB SERPL: 1 G/DL
ALP SERPL-CCNC: 81 U/L (ref 39–117)
ALT SERPL W P-5'-P-CCNC: 39 U/L (ref 1–33)
ANION GAP SERPL CALCULATED.3IONS-SCNC: 9 MMOL/L (ref 5–15)
AST SERPL-CCNC: 42 U/L (ref 1–32)
BASOPHILS # BLD AUTO: 0.01 10*3/MM3 (ref 0–0.2)
BASOPHILS NFR BLD AUTO: 0.1 % (ref 0–1.5)
BILIRUB SERPL-MCNC: <0.2 MG/DL (ref 0–1.2)
BLD GP AB SCN SERPL QL: NEGATIVE
BUN SERPL-MCNC: 54 MG/DL (ref 8–23)
BUN/CREAT SERPL: 38.8 (ref 7–25)
CALCIUM SPEC-SCNC: 9 MG/DL (ref 8.6–10.5)
CHLORIDE SERPL-SCNC: 106 MMOL/L (ref 98–107)
CO2 SERPL-SCNC: 21 MMOL/L (ref 22–29)
CREAT SERPL-MCNC: 1.39 MG/DL (ref 0.57–1)
DEPRECATED RDW RBC AUTO: 49.1 FL (ref 37–54)
EOSINOPHIL # BLD AUTO: 0.35 10*3/MM3 (ref 0–0.4)
EOSINOPHIL NFR BLD AUTO: 4.5 % (ref 0.3–6.2)
ERYTHROCYTE [DISTWIDTH] IN BLOOD BY AUTOMATED COUNT: 13.7 % (ref 12.3–15.4)
GFR SERPL CREATININE-BSD FRML MDRD: 36 ML/MIN/1.73
GLOBULIN UR ELPH-MCNC: 3 GM/DL
GLUCOSE BLDC GLUCOMTR-MCNC: 109 MG/DL (ref 70–130)
GLUCOSE BLDC GLUCOMTR-MCNC: 94 MG/DL (ref 70–130)
GLUCOSE SERPL-MCNC: 98 MG/DL (ref 65–99)
HBA1C MFR BLD: 10 % (ref 4.8–5.6)
HCT VFR BLD AUTO: 35.5 % (ref 34–46.6)
HGB BLD-MCNC: 11.2 G/DL (ref 12–15.9)
IMM GRANULOCYTES # BLD AUTO: 0.08 10*3/MM3 (ref 0–0.05)
IMM GRANULOCYTES NFR BLD AUTO: 1 % (ref 0–0.5)
LYMPHOCYTES # BLD AUTO: 1.17 10*3/MM3 (ref 0.7–3.1)
LYMPHOCYTES NFR BLD AUTO: 14.9 % (ref 19.6–45.3)
MCH RBC QN AUTO: 30.7 PG (ref 26.6–33)
MCHC RBC AUTO-ENTMCNC: 31.5 G/DL (ref 31.5–35.7)
MCV RBC AUTO: 97.3 FL (ref 79–97)
MONOCYTES # BLD AUTO: 0.67 10*3/MM3 (ref 0.1–0.9)
MONOCYTES NFR BLD AUTO: 8.6 % (ref 5–12)
NEUTROPHILS NFR BLD AUTO: 5.55 10*3/MM3 (ref 1.7–7)
NEUTROPHILS NFR BLD AUTO: 70.9 % (ref 42.7–76)
NRBC BLD AUTO-RTO: 0 /100 WBC (ref 0–0.2)
PLATELET # BLD AUTO: 272 10*3/MM3 (ref 140–450)
PMV BLD AUTO: 10.8 FL (ref 6–12)
POTASSIUM SERPL-SCNC: 5.1 MMOL/L (ref 3.5–5.2)
PROT SERPL-MCNC: 6 G/DL (ref 6–8.5)
RBC # BLD AUTO: 3.65 10*6/MM3 (ref 3.77–5.28)
RH BLD: POSITIVE
RH BLD: POSITIVE
SARS-COV-2 RDRP RESP QL NAA+PROBE: NORMAL
SODIUM SERPL-SCNC: 136 MMOL/L (ref 136–145)
T&S EXPIRATION DATE: NORMAL
WBC # BLD AUTO: 7.83 10*3/MM3 (ref 3.4–10.8)

## 2021-04-08 PROCEDURE — 25010000002 HEPARIN (PORCINE) PER 1000 UNITS: Performed by: INTERNAL MEDICINE

## 2021-04-08 PROCEDURE — 82962 GLUCOSE BLOOD TEST: CPT

## 2021-04-08 PROCEDURE — 75710 ARTERY X-RAYS ARM/LEG: CPT | Performed by: THORACIC SURGERY (CARDIOTHORACIC VASCULAR SURGERY)

## 2021-04-08 PROCEDURE — 86923 COMPATIBILITY TEST ELECTRIC: CPT

## 2021-04-08 PROCEDURE — 80053 COMPREHEN METABOLIC PANEL: CPT | Performed by: INTERNAL MEDICINE

## 2021-04-08 PROCEDURE — 86901 BLOOD TYPING SEROLOGIC RH(D): CPT

## 2021-04-08 PROCEDURE — 83036 HEMOGLOBIN GLYCOSYLATED A1C: CPT | Performed by: INTERNAL MEDICINE

## 2021-04-08 PROCEDURE — 87635 SARS-COV-2 COVID-19 AMP PRB: CPT | Performed by: INTERNAL MEDICINE

## 2021-04-08 PROCEDURE — 86900 BLOOD TYPING SEROLOGIC ABO: CPT

## 2021-04-08 PROCEDURE — 99231 SBSQ HOSP IP/OBS SF/LOW 25: CPT | Performed by: THORACIC SURGERY (CARDIOTHORACIC VASCULAR SURGERY)

## 2021-04-08 PROCEDURE — 75625 CONTRAST EXAM ABDOMINL AORTA: CPT | Performed by: THORACIC SURGERY (CARDIOTHORACIC VASCULAR SURGERY)

## 2021-04-08 PROCEDURE — 86900 BLOOD TYPING SEROLOGIC ABO: CPT | Performed by: PHYSICIAN ASSISTANT

## 2021-04-08 PROCEDURE — 85025 COMPLETE CBC W/AUTO DIFF WBC: CPT | Performed by: INTERNAL MEDICINE

## 2021-04-08 PROCEDURE — 86850 RBC ANTIBODY SCREEN: CPT | Performed by: PHYSICIAN ASSISTANT

## 2021-04-08 PROCEDURE — 86901 BLOOD TYPING SEROLOGIC RH(D): CPT | Performed by: PHYSICIAN ASSISTANT

## 2021-04-08 PROCEDURE — 99223 1ST HOSP IP/OBS HIGH 75: CPT | Performed by: INTERNAL MEDICINE

## 2021-04-08 RX ORDER — DEXTROSE MONOHYDRATE 25 G/50ML
25 INJECTION, SOLUTION INTRAVENOUS
Status: DISCONTINUED | OUTPATIENT
Start: 2021-04-08 | End: 2021-04-13

## 2021-04-08 RX ORDER — TRIAMTERENE AND HYDROCHLOROTHIAZIDE 37.5; 25 MG/1; MG/1
1 TABLET ORAL DAILY
Status: DISCONTINUED | OUTPATIENT
Start: 2021-04-09 | End: 2021-04-18

## 2021-04-08 RX ORDER — ONDANSETRON 2 MG/ML
4 INJECTION INTRAMUSCULAR; INTRAVENOUS EVERY 6 HOURS PRN
Status: DISCONTINUED | OUTPATIENT
Start: 2021-04-08 | End: 2021-04-14 | Stop reason: SDUPTHER

## 2021-04-08 RX ORDER — PREGABALIN 100 MG/1
200 CAPSULE ORAL NIGHTLY
Status: DISCONTINUED | OUTPATIENT
Start: 2021-04-08 | End: 2021-04-22 | Stop reason: HOSPADM

## 2021-04-08 RX ORDER — NICOTINE POLACRILEX 4 MG
15 LOZENGE BUCCAL
Status: DISCONTINUED | OUTPATIENT
Start: 2021-04-08 | End: 2021-04-13

## 2021-04-08 RX ORDER — LISINOPRIL 5 MG/1
5 TABLET ORAL DAILY
Status: DISCONTINUED | OUTPATIENT
Start: 2021-04-09 | End: 2021-04-14

## 2021-04-08 RX ORDER — LISINOPRIL 5 MG/1
5 TABLET ORAL DAILY
COMMUNITY
End: 2021-04-22 | Stop reason: HOSPADM

## 2021-04-08 RX ORDER — PREGABALIN 100 MG/1
100 CAPSULE ORAL DAILY
Status: DISCONTINUED | OUTPATIENT
Start: 2021-04-09 | End: 2021-04-22 | Stop reason: HOSPADM

## 2021-04-08 RX ORDER — ALLOPURINOL 300 MG/1
300 TABLET ORAL DAILY
Status: DISCONTINUED | OUTPATIENT
Start: 2021-04-09 | End: 2021-04-22 | Stop reason: HOSPADM

## 2021-04-08 RX ORDER — METRONIDAZOLE 500 MG/1
500 TABLET ORAL EVERY 8 HOURS SCHEDULED
Status: DISCONTINUED | OUTPATIENT
Start: 2021-04-08 | End: 2021-04-20

## 2021-04-08 RX ORDER — TRAMADOL HYDROCHLORIDE 50 MG/1
50 TABLET ORAL EVERY 8 HOURS PRN
Status: DISCONTINUED | OUTPATIENT
Start: 2021-04-08 | End: 2021-04-22 | Stop reason: HOSPADM

## 2021-04-08 RX ORDER — SODIUM CHLORIDE 0.9 % (FLUSH) 0.9 %
10 SYRINGE (ML) INJECTION EVERY 12 HOURS SCHEDULED
Status: DISCONTINUED | OUTPATIENT
Start: 2021-04-08 | End: 2021-04-22 | Stop reason: HOSPADM

## 2021-04-08 RX ORDER — HEPARIN SODIUM 5000 [USP'U]/ML
5000 INJECTION, SOLUTION INTRAVENOUS; SUBCUTANEOUS EVERY 8 HOURS SCHEDULED
Status: DISCONTINUED | OUTPATIENT
Start: 2021-04-08 | End: 2021-04-22 | Stop reason: HOSPADM

## 2021-04-08 RX ORDER — SODIUM CHLORIDE 0.9 % (FLUSH) 0.9 %
10 SYRINGE (ML) INJECTION AS NEEDED
Status: DISCONTINUED | OUTPATIENT
Start: 2021-04-08 | End: 2021-04-22 | Stop reason: HOSPADM

## 2021-04-08 RX ADMIN — METRONIDAZOLE 500 MG: 500 TABLET ORAL at 17:05

## 2021-04-08 RX ADMIN — PREGABALIN 200 MG: 100 CAPSULE ORAL at 20:18

## 2021-04-08 RX ADMIN — HEPARIN SODIUM 5000 UNITS: 5000 INJECTION, SOLUTION INTRAVENOUS; SUBCUTANEOUS at 17:05

## 2021-04-08 RX ADMIN — METRONIDAZOLE 500 MG: 500 TABLET ORAL at 20:17

## 2021-04-08 RX ADMIN — HEPARIN SODIUM 5000 UNITS: 5000 INJECTION, SOLUTION INTRAVENOUS; SUBCUTANEOUS at 20:18

## 2021-04-09 ENCOUNTER — APPOINTMENT (OUTPATIENT)
Dept: GENERAL RADIOLOGY | Facility: HOSPITAL | Age: 81
End: 2021-04-09

## 2021-04-09 ENCOUNTER — ANESTHESIA (OUTPATIENT)
Dept: PERIOP | Facility: HOSPITAL | Age: 81
End: 2021-04-09

## 2021-04-09 ENCOUNTER — ANESTHESIA EVENT (OUTPATIENT)
Dept: PERIOP | Facility: HOSPITAL | Age: 81
End: 2021-04-09

## 2021-04-09 LAB
ANION GAP SERPL CALCULATED.3IONS-SCNC: 7 MMOL/L (ref 5–15)
BUN SERPL-MCNC: 49 MG/DL (ref 8–23)
BUN/CREAT SERPL: 37.7 (ref 7–25)
CALCIUM SPEC-SCNC: 8 MG/DL (ref 8.6–10.5)
CHLORIDE SERPL-SCNC: 107 MMOL/L (ref 98–107)
CO2 SERPL-SCNC: 19 MMOL/L (ref 22–29)
CREAT SERPL-MCNC: 1.3 MG/DL (ref 0.57–1)
DEPRECATED RDW RBC AUTO: 48.8 FL (ref 37–54)
ERYTHROCYTE [DISTWIDTH] IN BLOOD BY AUTOMATED COUNT: 13.7 % (ref 12.3–15.4)
GFR SERPL CREATININE-BSD FRML MDRD: 39 ML/MIN/1.73
GLUCOSE BLDC GLUCOMTR-MCNC: 129 MG/DL (ref 70–130)
GLUCOSE BLDC GLUCOMTR-MCNC: 130 MG/DL (ref 70–130)
GLUCOSE BLDC GLUCOMTR-MCNC: 155 MG/DL (ref 70–130)
GLUCOSE BLDC GLUCOMTR-MCNC: 165 MG/DL (ref 70–130)
GLUCOSE BLDC GLUCOMTR-MCNC: 176 MG/DL (ref 70–130)
GLUCOSE SERPL-MCNC: 169 MG/DL (ref 65–99)
HCT VFR BLD AUTO: 32.6 % (ref 34–46.6)
HGB BLD-MCNC: 10.2 G/DL (ref 12–15.9)
MCH RBC QN AUTO: 30.6 PG (ref 26.6–33)
MCHC RBC AUTO-ENTMCNC: 31.3 G/DL (ref 31.5–35.7)
MCV RBC AUTO: 97.9 FL (ref 79–97)
PLATELET # BLD AUTO: 251 10*3/MM3 (ref 140–450)
PMV BLD AUTO: 10.5 FL (ref 6–12)
POTASSIUM SERPL-SCNC: 5.4 MMOL/L (ref 3.5–5.2)
RBC # BLD AUTO: 3.33 10*6/MM3 (ref 3.77–5.28)
SODIUM SERPL-SCNC: 133 MMOL/L (ref 136–145)
WBC # BLD AUTO: 7.09 10*3/MM3 (ref 3.4–10.8)

## 2021-04-09 PROCEDURE — 82962 GLUCOSE BLOOD TEST: CPT

## 2021-04-09 PROCEDURE — 25010000002 NEOSTIGMINE 10 MG/10ML SOLUTION: Performed by: ANESTHESIOLOGY

## 2021-04-09 PROCEDURE — C1894 INTRO/SHEATH, NON-LASER: HCPCS | Performed by: THORACIC SURGERY (CARDIOTHORACIC VASCULAR SURGERY)

## 2021-04-09 PROCEDURE — 80048 BASIC METABOLIC PNL TOTAL CA: CPT | Performed by: INTERNAL MEDICINE

## 2021-04-09 PROCEDURE — 25010000002 HEPARIN (PORCINE) PER 1000 UNITS: Performed by: INTERNAL MEDICINE

## 2021-04-09 PROCEDURE — C1769 GUIDE WIRE: HCPCS | Performed by: THORACIC SURGERY (CARDIOTHORACIC VASCULAR SURGERY)

## 2021-04-09 PROCEDURE — 25010000003 CEFUROXIME SODIUM 1.5 G RECONSTITUTED SOLUTION: Performed by: PHYSICIAN ASSISTANT

## 2021-04-09 PROCEDURE — 25010000002 FENTANYL CITRATE (PF) 100 MCG/2ML SOLUTION: Performed by: ANESTHESIOLOGY

## 2021-04-09 PROCEDURE — 25010000002 CEFTRIAXONE PER 250 MG: Performed by: INTERNAL MEDICINE

## 2021-04-09 PROCEDURE — B41FZZZ FLUOROSCOPY OF RIGHT LOWER EXTREMITY ARTERIES: ICD-10-PCS | Performed by: THORACIC SURGERY (CARDIOTHORACIC VASCULAR SURGERY)

## 2021-04-09 PROCEDURE — 25010000002 HEPARIN (PORCINE) PER 1000 UNITS: Performed by: ANESTHESIOLOGY

## 2021-04-09 PROCEDURE — 25010000003 LIDOCAINE 1 % SOLUTION: Performed by: ANESTHESIOLOGY

## 2021-04-09 PROCEDURE — 75716 ARTERY X-RAYS ARMS/LEGS: CPT

## 2021-04-09 PROCEDURE — 93005 ELECTROCARDIOGRAM TRACING: CPT | Performed by: ANESTHESIOLOGY

## 2021-04-09 PROCEDURE — 85027 COMPLETE CBC AUTOMATED: CPT | Performed by: INTERNAL MEDICINE

## 2021-04-09 PROCEDURE — 25010000002 PROPOFOL 10 MG/ML EMULSION: Performed by: ANESTHESIOLOGY

## 2021-04-09 PROCEDURE — 25010000002 HEPARIN (PORCINE) PER 1000 UNITS: Performed by: THORACIC SURGERY (CARDIOTHORACIC VASCULAR SURGERY)

## 2021-04-09 PROCEDURE — 25010000002 HEPARIN (PORCINE) PER 1000 UNITS: Performed by: PHYSICIAN ASSISTANT

## 2021-04-09 PROCEDURE — 63710000001 INSULIN LISPRO (HUMAN) PER 5 UNITS: Performed by: INTERNAL MEDICINE

## 2021-04-09 PROCEDURE — 25010000002 ONDANSETRON PER 1 MG: Performed by: INTERNAL MEDICINE

## 2021-04-09 PROCEDURE — 25010000002 ONDANSETRON PER 1 MG: Performed by: ANESTHESIOLOGY

## 2021-04-09 PROCEDURE — 99232 SBSQ HOSP IP/OBS MODERATE 35: CPT | Performed by: INTERNAL MEDICINE

## 2021-04-09 PROCEDURE — 93010 ELECTROCARDIOGRAM REPORT: CPT | Performed by: INTERNAL MEDICINE

## 2021-04-09 PROCEDURE — C1887 CATHETER, GUIDING: HCPCS | Performed by: THORACIC SURGERY (CARDIOTHORACIC VASCULAR SURGERY)

## 2021-04-09 PROCEDURE — 36247 INS CATH ABD/L-EXT ART 3RD: CPT | Performed by: THORACIC SURGERY (CARDIOTHORACIC VASCULAR SURGERY)

## 2021-04-09 PROCEDURE — 25010000003 LIDOCAINE 1 % SOLUTION: Performed by: THORACIC SURGERY (CARDIOTHORACIC VASCULAR SURGERY)

## 2021-04-09 RX ORDER — PROPOFOL 10 MG/ML
VIAL (ML) INTRAVENOUS AS NEEDED
Status: DISCONTINUED | OUTPATIENT
Start: 2021-04-09 | End: 2021-04-09 | Stop reason: SURG

## 2021-04-09 RX ORDER — MIDAZOLAM HYDROCHLORIDE 1 MG/ML
0.5 INJECTION INTRAMUSCULAR; INTRAVENOUS
Status: DISCONTINUED | OUTPATIENT
Start: 2021-04-09 | End: 2021-04-09 | Stop reason: HOSPADM

## 2021-04-09 RX ORDER — ONDANSETRON 2 MG/ML
4 INJECTION INTRAMUSCULAR; INTRAVENOUS ONCE
Status: DISCONTINUED | OUTPATIENT
Start: 2021-04-09 | End: 2021-04-09 | Stop reason: HOSPADM

## 2021-04-09 RX ORDER — SODIUM CHLORIDE 9 MG/ML
9 INJECTION, SOLUTION INTRAVENOUS CONTINUOUS
Status: DISCONTINUED | OUTPATIENT
Start: 2021-04-09 | End: 2021-04-15

## 2021-04-09 RX ORDER — FAMOTIDINE 10 MG/ML
20 INJECTION, SOLUTION INTRAVENOUS ONCE
Status: COMPLETED | OUTPATIENT
Start: 2021-04-09 | End: 2021-04-09

## 2021-04-09 RX ORDER — SODIUM CHLORIDE 0.9 % (FLUSH) 0.9 %
10 SYRINGE (ML) INJECTION AS NEEDED
Status: DISCONTINUED | OUTPATIENT
Start: 2021-04-09 | End: 2021-04-09 | Stop reason: HOSPADM

## 2021-04-09 RX ORDER — HEPARIN SODIUM 1000 [USP'U]/ML
INJECTION, SOLUTION INTRAVENOUS; SUBCUTANEOUS AS NEEDED
Status: DISCONTINUED | OUTPATIENT
Start: 2021-04-09 | End: 2021-04-09 | Stop reason: SURG

## 2021-04-09 RX ORDER — HYDROMORPHONE HYDROCHLORIDE 1 MG/ML
0.5 INJECTION, SOLUTION INTRAMUSCULAR; INTRAVENOUS; SUBCUTANEOUS
Status: DISCONTINUED | OUTPATIENT
Start: 2021-04-09 | End: 2021-04-09 | Stop reason: HOSPADM

## 2021-04-09 RX ORDER — SODIUM CHLORIDE, SODIUM LACTATE, POTASSIUM CHLORIDE, CALCIUM CHLORIDE 600; 310; 30; 20 MG/100ML; MG/100ML; MG/100ML; MG/100ML
9 INJECTION, SOLUTION INTRAVENOUS CONTINUOUS
Status: CANCELLED | OUTPATIENT
Start: 2021-04-09

## 2021-04-09 RX ORDER — LIDOCAINE HYDROCHLORIDE 10 MG/ML
INJECTION, SOLUTION INFILTRATION; PERINEURAL AS NEEDED
Status: DISCONTINUED | OUTPATIENT
Start: 2021-04-09 | End: 2021-04-09 | Stop reason: HOSPADM

## 2021-04-09 RX ORDER — NEOSTIGMINE METHYLSULFATE 1 MG/ML
INJECTION, SOLUTION INTRAVENOUS AS NEEDED
Status: DISCONTINUED | OUTPATIENT
Start: 2021-04-09 | End: 2021-04-09 | Stop reason: SURG

## 2021-04-09 RX ORDER — LIDOCAINE HYDROCHLORIDE 10 MG/ML
INJECTION, SOLUTION INFILTRATION; PERINEURAL AS NEEDED
Status: DISCONTINUED | OUTPATIENT
Start: 2021-04-09 | End: 2021-04-09 | Stop reason: SURG

## 2021-04-09 RX ORDER — GLYCOPYRROLATE 0.2 MG/ML
INJECTION INTRAMUSCULAR; INTRAVENOUS AS NEEDED
Status: DISCONTINUED | OUTPATIENT
Start: 2021-04-09 | End: 2021-04-09 | Stop reason: SURG

## 2021-04-09 RX ORDER — SODIUM CHLORIDE 0.9 % (FLUSH) 0.9 %
10 SYRINGE (ML) INJECTION EVERY 12 HOURS SCHEDULED
Status: DISCONTINUED | OUTPATIENT
Start: 2021-04-09 | End: 2021-04-09 | Stop reason: HOSPADM

## 2021-04-09 RX ORDER — LIDOCAINE HYDROCHLORIDE 10 MG/ML
0.5 INJECTION, SOLUTION EPIDURAL; INFILTRATION; INTRACAUDAL; PERINEURAL ONCE AS NEEDED
Status: COMPLETED | OUTPATIENT
Start: 2021-04-09 | End: 2021-04-09

## 2021-04-09 RX ORDER — SODIUM CHLORIDE, SODIUM LACTATE, POTASSIUM CHLORIDE, CALCIUM CHLORIDE 600; 310; 30; 20 MG/100ML; MG/100ML; MG/100ML; MG/100ML
INJECTION, SOLUTION INTRAVENOUS CONTINUOUS PRN
Status: DISCONTINUED | OUTPATIENT
Start: 2021-04-09 | End: 2021-04-09 | Stop reason: SURG

## 2021-04-09 RX ORDER — FAMOTIDINE 20 MG/1
20 TABLET, FILM COATED ORAL ONCE
Status: DISCONTINUED | OUTPATIENT
Start: 2021-04-09 | End: 2021-04-09

## 2021-04-09 RX ORDER — ROCURONIUM BROMIDE 10 MG/ML
INJECTION, SOLUTION INTRAVENOUS AS NEEDED
Status: DISCONTINUED | OUTPATIENT
Start: 2021-04-09 | End: 2021-04-09 | Stop reason: SURG

## 2021-04-09 RX ORDER — BUPIVACAINE HCL/0.9 % NACL/PF 0.125 %
PLASTIC BAG, INJECTION (ML) EPIDURAL AS NEEDED
Status: DISCONTINUED | OUTPATIENT
Start: 2021-04-09 | End: 2021-04-09 | Stop reason: SURG

## 2021-04-09 RX ORDER — ONDANSETRON 2 MG/ML
4 INJECTION INTRAMUSCULAR; INTRAVENOUS ONCE
Status: DISCONTINUED | OUTPATIENT
Start: 2021-04-09 | End: 2021-04-09

## 2021-04-09 RX ORDER — MIDAZOLAM HYDROCHLORIDE 1 MG/ML
1 INJECTION INTRAMUSCULAR; INTRAVENOUS
Status: DISCONTINUED | OUTPATIENT
Start: 2021-04-09 | End: 2021-04-09 | Stop reason: HOSPADM

## 2021-04-09 RX ORDER — LIDOCAINE HYDROCHLORIDE 10 MG/ML
0.5 INJECTION, SOLUTION EPIDURAL; INFILTRATION; INTRACAUDAL; PERINEURAL ONCE AS NEEDED
Status: CANCELLED | OUTPATIENT
Start: 2021-04-09

## 2021-04-09 RX ORDER — FENTANYL CITRATE 50 UG/ML
50 INJECTION, SOLUTION INTRAMUSCULAR; INTRAVENOUS
Status: DISCONTINUED | OUTPATIENT
Start: 2021-04-09 | End: 2021-04-09 | Stop reason: HOSPADM

## 2021-04-09 RX ADMIN — TRIAMTERENE AND HYDROCHLOROTHIAZIDE 1 TABLET: 37.5; 25 TABLET ORAL at 08:36

## 2021-04-09 RX ADMIN — SODIUM CHLORIDE 9 ML/HR: 9 INJECTION, SOLUTION INTRAVENOUS at 15:48

## 2021-04-09 RX ADMIN — LIDOCAINE HYDROCHLORIDE 50 MG: 10 INJECTION, SOLUTION INFILTRATION; PERINEURAL at 18:11

## 2021-04-09 RX ADMIN — ROCURONIUM BROMIDE 55 MG: 10 INJECTION INTRAVENOUS at 18:11

## 2021-04-09 RX ADMIN — HEPARIN SODIUM 5000 UNITS: 5000 INJECTION, SOLUTION INTRAVENOUS; SUBCUTANEOUS at 14:19

## 2021-04-09 RX ADMIN — INSULIN LISPRO 2 UNITS: 100 INJECTION, SOLUTION INTRAVENOUS; SUBCUTANEOUS at 08:39

## 2021-04-09 RX ADMIN — HEPARIN SODIUM 5000 UNITS: 5000 INJECTION, SOLUTION INTRAVENOUS; SUBCUTANEOUS at 06:25

## 2021-04-09 RX ADMIN — METRONIDAZOLE 500 MG: 500 TABLET ORAL at 14:58

## 2021-04-09 RX ADMIN — PREGABALIN 100 MG: 100 CAPSULE ORAL at 08:36

## 2021-04-09 RX ADMIN — HEPARIN SODIUM 5000 UNITS: 5000 INJECTION, SOLUTION INTRAVENOUS; SUBCUTANEOUS at 22:20

## 2021-04-09 RX ADMIN — SODIUM CHLORIDE 2 G: 900 INJECTION INTRAVENOUS at 08:36

## 2021-04-09 RX ADMIN — GLYCOPYRROLATE 0.4 MG: 0.4 INJECTION INTRAMUSCULAR; INTRAVENOUS at 18:49

## 2021-04-09 RX ADMIN — ONDANSETRON 4 MG: 2 INJECTION INTRAMUSCULAR; INTRAVENOUS at 15:00

## 2021-04-09 RX ADMIN — HEPARIN SODIUM 5000 UNITS: 1000 INJECTION, SOLUTION INTRAVENOUS; SUBCUTANEOUS at 18:30

## 2021-04-09 RX ADMIN — SUGAMMADEX 200 MG: 100 INJECTION, SOLUTION INTRAVENOUS at 18:49

## 2021-04-09 RX ADMIN — NEOSTIGMINE 2.5 MG: 1 INJECTION INTRAVENOUS at 18:49

## 2021-04-09 RX ADMIN — METRONIDAZOLE 500 MG: 500 TABLET ORAL at 06:25

## 2021-04-09 RX ADMIN — Medication 100 MCG: at 18:19

## 2021-04-09 RX ADMIN — FENTANYL CITRATE 50 MCG: 50 INJECTION, SOLUTION INTRAMUSCULAR; INTRAVENOUS at 19:24

## 2021-04-09 RX ADMIN — LIDOCAINE HYDROCHLORIDE 0.5 ML: 10 INJECTION, SOLUTION EPIDURAL; INFILTRATION; INTRACAUDAL; PERINEURAL at 15:48

## 2021-04-09 RX ADMIN — LISINOPRIL 5 MG: 5 TABLET ORAL at 08:36

## 2021-04-09 RX ADMIN — GLYCOPYRROLATE 0.2 MG: 0.4 INJECTION INTRAMUSCULAR; INTRAVENOUS at 18:17

## 2021-04-09 RX ADMIN — CEFUROXIME SODIUM 1.5 G: 1.5 INJECTION, POWDER, FOR SOLUTION INTRAVENOUS at 06:25

## 2021-04-09 RX ADMIN — INSULIN LISPRO 2 UNITS: 100 INJECTION, SOLUTION INTRAVENOUS; SUBCUTANEOUS at 12:02

## 2021-04-09 RX ADMIN — FAMOTIDINE 20 MG: 10 INJECTION INTRAVENOUS at 15:56

## 2021-04-09 RX ADMIN — PROPOFOL 150 MG: 10 INJECTION, EMULSION INTRAVENOUS at 18:11

## 2021-04-09 RX ADMIN — SODIUM CHLORIDE, POTASSIUM CHLORIDE, SODIUM LACTATE AND CALCIUM CHLORIDE: 600; 310; 30; 20 INJECTION, SOLUTION INTRAVENOUS at 18:05

## 2021-04-09 RX ADMIN — ROCURONIUM BROMIDE 5 MG: 10 INJECTION INTRAVENOUS at 18:10

## 2021-04-09 RX ADMIN — GLYCOPYRROLATE 0.2 MG: 0.4 INJECTION INTRAMUSCULAR; INTRAVENOUS at 18:19

## 2021-04-09 RX ADMIN — METRONIDAZOLE 500 MG: 500 TABLET ORAL at 22:19

## 2021-04-09 RX ADMIN — ALLOPURINOL 300 MG: 300 TABLET ORAL at 08:36

## 2021-04-09 RX ADMIN — PREGABALIN 200 MG: 100 CAPSULE ORAL at 22:18

## 2021-04-09 RX ADMIN — FENTANYL CITRATE 50 MCG: 50 INJECTION, SOLUTION INTRAMUSCULAR; INTRAVENOUS at 19:32

## 2021-04-09 NOTE — ANESTHESIA PROCEDURE NOTES
Airway  Urgency: elective    Date/Time: 4/9/2021 6:13 PM  End Time:4/9/2021 6:13 PM  Airway not difficult    General Information and Staff    Patient location during procedure: OR    Indications and Patient Condition  Indications for airway management: airway protection    Preoxygenated: yes  MILS not maintained throughout  Mask difficulty assessment: 1 - vent by mask    Final Airway Details  Final airway type: endotracheal airway      Successful airway: ETT  Cuffed: yes   Successful intubation technique: direct laryngoscopy  Endotracheal tube insertion site: oral  Blade: Sandip  Blade size: 3  ETT size (mm): 7.0  Cormack-Lehane Classification: grade I - full view of glottis  Placement verified by: chest auscultation and capnometry   Measured from: lips  ETT/EBT  to lips (cm): 20  Number of attempts at approach: 1  Assessment: lips, teeth, and gum same as pre-op and atraumatic intubation    Additional Comments  Negative epigastric sounds, Breath sound equal bilaterally with symmetric chest rise and fall

## 2021-04-09 NOTE — ANESTHESIA POSTPROCEDURE EVALUATION
Patient: Susan Anderson    Procedure Summary     Date: 04/09/21 Room / Location:  [facility] /  [facility]    Anesthesia Start: 1804 Anesthesia Stop: 1904    Procedure: AORTAGRAM WITH OR WITHOUT RUNOFFS POSSIBLE STENT, WITH Co2 (N/A ) Diagnosis:       PVD (peripheral vascular disease) (CMS/HCC)      (PVD (peripheral vascular disease) (CMS/HCC) [I73.9])    Surgeons: Trey Forrest MD Provider: Elvis Walker MD    Anesthesia Type: general ASA Status: 3          Anesthesia Type: general    Vitals  No vitals data found for the desired time range.          Post Anesthesia Care and Evaluation    Patient location during evaluation: PACU  Patient participation: complete - patient participated  Level of consciousness: awake and alert  Pain management: adequate  Airway patency: patent  Anesthetic complications: No anesthetic complications  PONV Status: none  Cardiovascular status: hemodynamically stable and acceptable  Respiratory status: nonlabored ventilation, acceptable and nasal cannula  Hydration status: acceptable

## 2021-04-09 NOTE — ANESTHESIA PREPROCEDURE EVALUATION
Anesthesia Evaluation     Patient summary reviewed and Nursing notes reviewed   no history of anesthetic complications:  NPO Solid Status: > 8 hours  NPO Liquid Status: > 8 hours           Airway   Mallampati: I  TM distance: >3 FB  Neck ROM: full  No difficulty expected  Dental - normal exam     Pulmonary - negative pulmonary ROS and normal exam   Cardiovascular - normal exam    ECG reviewed    (+) hypertension, PVD,   (-) valvular problems/murmurs, dysrhythmias, angina      Neuro/Psych- negative ROS    ROS Comment: H/o brain tumor excision  GI/Hepatic/Renal/Endo    (+) obesity,   renal disease CRI, diabetes mellitus,   (-) GERD, liver disease, no thyroid disorder    ROS Comment: Intermittent nausea    Musculoskeletal     Abdominal    Substance History      OB/GYN          Other      history of cancer    ROS/Med Hx Other: Diabetic ulcer on foot  K 5.4                Anesthesia Plan    ASA 3     general   Rapid sequence(RSI with andrea will likely need suggamadex reversalk)  intravenous induction     Anesthetic plan, all risks, benefits, and alternatives have been provided, discussed and informed consent has been obtained with: patient.    Plan discussed with CRNA.

## 2021-04-10 LAB
ANION GAP SERPL CALCULATED.3IONS-SCNC: 9 MMOL/L (ref 5–15)
BASOPHILS # BLD AUTO: 0.02 10*3/MM3 (ref 0–0.2)
BASOPHILS NFR BLD AUTO: 0.2 % (ref 0–1.5)
BUN SERPL-MCNC: 40 MG/DL (ref 8–23)
BUN/CREAT SERPL: 28.6 (ref 7–25)
CALCIUM SPEC-SCNC: 8.4 MG/DL (ref 8.6–10.5)
CHLORIDE SERPL-SCNC: 102 MMOL/L (ref 98–107)
CO2 SERPL-SCNC: 20 MMOL/L (ref 22–29)
CREAT SERPL-MCNC: 1.4 MG/DL (ref 0.57–1)
DEPRECATED RDW RBC AUTO: 49.5 FL (ref 37–54)
EOSINOPHIL # BLD AUTO: 0.22 10*3/MM3 (ref 0–0.4)
EOSINOPHIL NFR BLD AUTO: 1.8 % (ref 0.3–6.2)
ERYTHROCYTE [DISTWIDTH] IN BLOOD BY AUTOMATED COUNT: 13.7 % (ref 12.3–15.4)
GFR SERPL CREATININE-BSD FRML MDRD: 36 ML/MIN/1.73
GLUCOSE BLDC GLUCOMTR-MCNC: 202 MG/DL (ref 70–130)
GLUCOSE BLDC GLUCOMTR-MCNC: 218 MG/DL (ref 70–130)
GLUCOSE BLDC GLUCOMTR-MCNC: 245 MG/DL (ref 70–130)
GLUCOSE BLDC GLUCOMTR-MCNC: 251 MG/DL (ref 70–130)
GLUCOSE SERPL-MCNC: 227 MG/DL (ref 65–99)
HCT VFR BLD AUTO: 34.6 % (ref 34–46.6)
HGB BLD-MCNC: 10.7 G/DL (ref 12–15.9)
IMM GRANULOCYTES # BLD AUTO: 0.23 10*3/MM3 (ref 0–0.05)
IMM GRANULOCYTES NFR BLD AUTO: 1.8 % (ref 0–0.5)
LYMPHOCYTES # BLD AUTO: 1.45 10*3/MM3 (ref 0.7–3.1)
LYMPHOCYTES NFR BLD AUTO: 11.6 % (ref 19.6–45.3)
MCH RBC QN AUTO: 30.8 PG (ref 26.6–33)
MCHC RBC AUTO-ENTMCNC: 30.9 G/DL (ref 31.5–35.7)
MCV RBC AUTO: 99.7 FL (ref 79–97)
MONOCYTES # BLD AUTO: 1.09 10*3/MM3 (ref 0.1–0.9)
MONOCYTES NFR BLD AUTO: 8.7 % (ref 5–12)
NEUTROPHILS NFR BLD AUTO: 75.9 % (ref 42.7–76)
NEUTROPHILS NFR BLD AUTO: 9.54 10*3/MM3 (ref 1.7–7)
NRBC BLD AUTO-RTO: 0 /100 WBC (ref 0–0.2)
PLATELET # BLD AUTO: 289 10*3/MM3 (ref 140–450)
PMV BLD AUTO: 10.8 FL (ref 6–12)
POTASSIUM SERPL-SCNC: 5 MMOL/L (ref 3.5–5.2)
RBC # BLD AUTO: 3.47 10*6/MM3 (ref 3.77–5.28)
SODIUM SERPL-SCNC: 131 MMOL/L (ref 136–145)
WBC # BLD AUTO: 12.55 10*3/MM3 (ref 3.4–10.8)

## 2021-04-10 PROCEDURE — 97162 PT EVAL MOD COMPLEX 30 MIN: CPT

## 2021-04-10 PROCEDURE — 80048 BASIC METABOLIC PNL TOTAL CA: CPT | Performed by: PHYSICIAN ASSISTANT

## 2021-04-10 PROCEDURE — 99232 SBSQ HOSP IP/OBS MODERATE 35: CPT | Performed by: INTERNAL MEDICINE

## 2021-04-10 PROCEDURE — 99231 SBSQ HOSP IP/OBS SF/LOW 25: CPT | Performed by: THORACIC SURGERY (CARDIOTHORACIC VASCULAR SURGERY)

## 2021-04-10 PROCEDURE — 25010000002 HEPARIN (PORCINE) PER 1000 UNITS: Performed by: PHYSICIAN ASSISTANT

## 2021-04-10 PROCEDURE — 63710000001 INSULIN LISPRO (HUMAN) PER 5 UNITS: Performed by: PHYSICIAN ASSISTANT

## 2021-04-10 PROCEDURE — 97530 THERAPEUTIC ACTIVITIES: CPT

## 2021-04-10 PROCEDURE — 85025 COMPLETE CBC W/AUTO DIFF WBC: CPT | Performed by: PHYSICIAN ASSISTANT

## 2021-04-10 PROCEDURE — 82962 GLUCOSE BLOOD TEST: CPT

## 2021-04-10 PROCEDURE — 25010000002 CEFTRIAXONE PER 250 MG: Performed by: PHYSICIAN ASSISTANT

## 2021-04-10 RX ADMIN — INSULIN LISPRO 4 UNITS: 100 INJECTION, SOLUTION INTRAVENOUS; SUBCUTANEOUS at 12:19

## 2021-04-10 RX ADMIN — INSULIN LISPRO 2 UNITS: 100 INJECTION, SOLUTION INTRAVENOUS; SUBCUTANEOUS at 16:47

## 2021-04-10 RX ADMIN — INSULIN LISPRO 4 UNITS: 100 INJECTION, SOLUTION INTRAVENOUS; SUBCUTANEOUS at 07:36

## 2021-04-10 RX ADMIN — LISINOPRIL 5 MG: 5 TABLET ORAL at 07:35

## 2021-04-10 RX ADMIN — ALLOPURINOL 300 MG: 300 TABLET ORAL at 07:36

## 2021-04-10 RX ADMIN — HEPARIN SODIUM 5000 UNITS: 5000 INJECTION, SOLUTION INTRAVENOUS; SUBCUTANEOUS at 21:50

## 2021-04-10 RX ADMIN — METRONIDAZOLE 500 MG: 500 TABLET ORAL at 06:15

## 2021-04-10 RX ADMIN — HEPARIN SODIUM 5000 UNITS: 5000 INJECTION, SOLUTION INTRAVENOUS; SUBCUTANEOUS at 06:15

## 2021-04-10 RX ADMIN — METRONIDAZOLE 500 MG: 500 TABLET ORAL at 12:19

## 2021-04-10 RX ADMIN — TRIAMTERENE AND HYDROCHLOROTHIAZIDE 1 TABLET: 37.5; 25 TABLET ORAL at 07:36

## 2021-04-10 RX ADMIN — PREGABALIN 200 MG: 100 CAPSULE ORAL at 21:49

## 2021-04-10 RX ADMIN — SODIUM CHLORIDE 2 G: 900 INJECTION INTRAVENOUS at 07:36

## 2021-04-10 RX ADMIN — PREGABALIN 100 MG: 100 CAPSULE ORAL at 07:36

## 2021-04-10 RX ADMIN — METRONIDAZOLE 500 MG: 500 TABLET ORAL at 21:50

## 2021-04-10 RX ADMIN — HEPARIN SODIUM 5000 UNITS: 5000 INJECTION, SOLUTION INTRAVENOUS; SUBCUTANEOUS at 12:19

## 2021-04-11 LAB
ANION GAP SERPL CALCULATED.3IONS-SCNC: 6 MMOL/L (ref 5–15)
BASOPHILS # BLD AUTO: 0.03 10*3/MM3 (ref 0–0.2)
BASOPHILS NFR BLD AUTO: 0.4 % (ref 0–1.5)
BUN SERPL-MCNC: 34 MG/DL (ref 8–23)
BUN/CREAT SERPL: 26.2 (ref 7–25)
CALCIUM SPEC-SCNC: 8.3 MG/DL (ref 8.6–10.5)
CHLORIDE SERPL-SCNC: 102 MMOL/L (ref 98–107)
CO2 SERPL-SCNC: 22 MMOL/L (ref 22–29)
CREAT SERPL-MCNC: 1.3 MG/DL (ref 0.57–1)
DEPRECATED RDW RBC AUTO: 50.8 FL (ref 37–54)
EOSINOPHIL # BLD AUTO: 0.38 10*3/MM3 (ref 0–0.4)
EOSINOPHIL NFR BLD AUTO: 5 % (ref 0.3–6.2)
ERYTHROCYTE [DISTWIDTH] IN BLOOD BY AUTOMATED COUNT: 13.7 % (ref 12.3–15.4)
GFR SERPL CREATININE-BSD FRML MDRD: 39 ML/MIN/1.73
GLUCOSE BLDC GLUCOMTR-MCNC: 306 MG/DL (ref 70–130)
GLUCOSE BLDC GLUCOMTR-MCNC: 314 MG/DL (ref 70–130)
GLUCOSE BLDC GLUCOMTR-MCNC: 349 MG/DL (ref 70–130)
GLUCOSE BLDC GLUCOMTR-MCNC: 351 MG/DL (ref 70–130)
GLUCOSE SERPL-MCNC: 290 MG/DL (ref 65–99)
HCT VFR BLD AUTO: 34.6 % (ref 34–46.6)
HGB BLD-MCNC: 10.5 G/DL (ref 12–15.9)
IMM GRANULOCYTES # BLD AUTO: 0.13 10*3/MM3 (ref 0–0.05)
IMM GRANULOCYTES NFR BLD AUTO: 1.7 % (ref 0–0.5)
LYMPHOCYTES # BLD AUTO: 1.37 10*3/MM3 (ref 0.7–3.1)
LYMPHOCYTES NFR BLD AUTO: 18 % (ref 19.6–45.3)
MCH RBC QN AUTO: 30.4 PG (ref 26.6–33)
MCHC RBC AUTO-ENTMCNC: 30.3 G/DL (ref 31.5–35.7)
MCV RBC AUTO: 100.3 FL (ref 79–97)
MONOCYTES # BLD AUTO: 0.75 10*3/MM3 (ref 0.1–0.9)
MONOCYTES NFR BLD AUTO: 9.9 % (ref 5–12)
NEUTROPHILS NFR BLD AUTO: 4.95 10*3/MM3 (ref 1.7–7)
NEUTROPHILS NFR BLD AUTO: 65 % (ref 42.7–76)
NRBC BLD AUTO-RTO: 0 /100 WBC (ref 0–0.2)
PLATELET # BLD AUTO: 249 10*3/MM3 (ref 140–450)
PMV BLD AUTO: 10.3 FL (ref 6–12)
POTASSIUM SERPL-SCNC: 5.4 MMOL/L (ref 3.5–5.2)
RBC # BLD AUTO: 3.45 10*6/MM3 (ref 3.77–5.28)
SODIUM SERPL-SCNC: 130 MMOL/L (ref 136–145)
WBC # BLD AUTO: 7.61 10*3/MM3 (ref 3.4–10.8)

## 2021-04-11 PROCEDURE — 63710000001 INSULIN LISPRO (HUMAN) PER 5 UNITS: Performed by: NURSE PRACTITIONER

## 2021-04-11 PROCEDURE — 80048 BASIC METABOLIC PNL TOTAL CA: CPT | Performed by: INTERNAL MEDICINE

## 2021-04-11 PROCEDURE — 99232 SBSQ HOSP IP/OBS MODERATE 35: CPT | Performed by: NURSE PRACTITIONER

## 2021-04-11 PROCEDURE — 99231 SBSQ HOSP IP/OBS SF/LOW 25: CPT | Performed by: THORACIC SURGERY (CARDIOTHORACIC VASCULAR SURGERY)

## 2021-04-11 PROCEDURE — 63710000001 INSULIN LISPRO (HUMAN) PER 5 UNITS

## 2021-04-11 PROCEDURE — 85025 COMPLETE CBC W/AUTO DIFF WBC: CPT | Performed by: INTERNAL MEDICINE

## 2021-04-11 PROCEDURE — 25010000002 CEFTRIAXONE PER 250 MG: Performed by: PHYSICIAN ASSISTANT

## 2021-04-11 PROCEDURE — 25010000002 HEPARIN (PORCINE) PER 1000 UNITS: Performed by: PHYSICIAN ASSISTANT

## 2021-04-11 PROCEDURE — 63710000001 INSULIN DETEMIR PER 5 UNITS: Performed by: NURSE PRACTITIONER

## 2021-04-11 PROCEDURE — 82962 GLUCOSE BLOOD TEST: CPT

## 2021-04-11 RX ORDER — FLUTICASONE PROPIONATE 50 MCG
2 SPRAY, SUSPENSION (ML) NASAL DAILY
Status: DISCONTINUED | OUTPATIENT
Start: 2021-04-11 | End: 2021-04-22 | Stop reason: HOSPADM

## 2021-04-11 RX ADMIN — METRONIDAZOLE 500 MG: 500 TABLET ORAL at 06:29

## 2021-04-11 RX ADMIN — HEPARIN SODIUM 5000 UNITS: 5000 INJECTION, SOLUTION INTRAVENOUS; SUBCUTANEOUS at 13:24

## 2021-04-11 RX ADMIN — INSULIN LISPRO 10 UNITS: 100 INJECTION, SOLUTION INTRAVENOUS; SUBCUTANEOUS at 08:46

## 2021-04-11 RX ADMIN — METRONIDAZOLE 500 MG: 500 TABLET ORAL at 21:46

## 2021-04-11 RX ADMIN — INSULIN LISPRO 10 UNITS: 100 INJECTION, SOLUTION INTRAVENOUS; SUBCUTANEOUS at 17:19

## 2021-04-11 RX ADMIN — PREGABALIN 100 MG: 100 CAPSULE ORAL at 08:45

## 2021-04-11 RX ADMIN — METRONIDAZOLE 500 MG: 500 TABLET ORAL at 13:24

## 2021-04-11 RX ADMIN — SODIUM CHLORIDE, PRESERVATIVE FREE 10 ML: 5 INJECTION INTRAVENOUS at 21:46

## 2021-04-11 RX ADMIN — TRIAMTERENE AND HYDROCHLOROTHIAZIDE 1 TABLET: 37.5; 25 TABLET ORAL at 08:46

## 2021-04-11 RX ADMIN — HEPARIN SODIUM 5000 UNITS: 5000 INJECTION, SOLUTION INTRAVENOUS; SUBCUTANEOUS at 21:46

## 2021-04-11 RX ADMIN — HEPARIN SODIUM 5000 UNITS: 5000 INJECTION, SOLUTION INTRAVENOUS; SUBCUTANEOUS at 06:29

## 2021-04-11 RX ADMIN — INSULIN LISPRO 12 UNITS: 100 INJECTION, SOLUTION INTRAVENOUS; SUBCUTANEOUS at 21:46

## 2021-04-11 RX ADMIN — FLUTICASONE PROPIONATE 2 SPRAY: 50 SPRAY, METERED NASAL at 13:26

## 2021-04-11 RX ADMIN — INSULIN LISPRO 10 UNITS: 100 INJECTION, SOLUTION INTRAVENOUS; SUBCUTANEOUS at 12:04

## 2021-04-11 RX ADMIN — INSULIN DETEMIR 10 UNITS: 100 INJECTION, SOLUTION SUBCUTANEOUS at 21:47

## 2021-04-11 RX ADMIN — SODIUM CHLORIDE 2 G: 900 INJECTION INTRAVENOUS at 08:46

## 2021-04-11 RX ADMIN — ALLOPURINOL 300 MG: 300 TABLET ORAL at 08:45

## 2021-04-11 RX ADMIN — LISINOPRIL 5 MG: 5 TABLET ORAL at 08:46

## 2021-04-11 RX ADMIN — PREGABALIN 200 MG: 100 CAPSULE ORAL at 21:46

## 2021-04-11 RX ADMIN — SODIUM CHLORIDE, PRESERVATIVE FREE 10 ML: 5 INJECTION INTRAVENOUS at 08:48

## 2021-04-12 LAB
ANION GAP SERPL CALCULATED.3IONS-SCNC: 7 MMOL/L (ref 5–15)
BASOPHILS # BLD AUTO: 0.03 10*3/MM3 (ref 0–0.2)
BASOPHILS NFR BLD AUTO: 0.3 % (ref 0–1.5)
BH BB BLOOD EXPIRATION DATE: NORMAL
BH BB BLOOD TYPE BARCODE: 6200
BH BB DISPENSE STATUS: NORMAL
BH BB PRODUCT CODE: NORMAL
BH BB UNIT NUMBER: NORMAL
BUN SERPL-MCNC: 35 MG/DL (ref 8–23)
BUN/CREAT SERPL: 29.9 (ref 7–25)
CALCIUM SPEC-SCNC: 7.9 MG/DL (ref 8.6–10.5)
CHLORIDE SERPL-SCNC: 104 MMOL/L (ref 98–107)
CO2 SERPL-SCNC: 20 MMOL/L (ref 22–29)
CREAT SERPL-MCNC: 1.17 MG/DL (ref 0.57–1)
CROSSMATCH INTERPRETATION: NORMAL
DEPRECATED RDW RBC AUTO: 50.4 FL (ref 37–54)
EOSINOPHIL # BLD AUTO: 0.43 10*3/MM3 (ref 0–0.4)
EOSINOPHIL NFR BLD AUTO: 4.7 % (ref 0.3–6.2)
ERYTHROCYTE [DISTWIDTH] IN BLOOD BY AUTOMATED COUNT: 13.9 % (ref 12.3–15.4)
GFR SERPL CREATININE-BSD FRML MDRD: 44 ML/MIN/1.73
GLUCOSE BLDC GLUCOMTR-MCNC: 210 MG/DL (ref 70–130)
GLUCOSE BLDC GLUCOMTR-MCNC: 265 MG/DL (ref 70–130)
GLUCOSE BLDC GLUCOMTR-MCNC: 388 MG/DL (ref 70–130)
GLUCOSE BLDC GLUCOMTR-MCNC: 427 MG/DL (ref 70–130)
GLUCOSE SERPL-MCNC: 202 MG/DL (ref 65–99)
HCT VFR BLD AUTO: 34 % (ref 34–46.6)
HGB BLD-MCNC: 10.4 G/DL (ref 12–15.9)
IMM GRANULOCYTES # BLD AUTO: 0.2 10*3/MM3 (ref 0–0.05)
IMM GRANULOCYTES NFR BLD AUTO: 2.2 % (ref 0–0.5)
LYMPHOCYTES # BLD AUTO: 1.72 10*3/MM3 (ref 0.7–3.1)
LYMPHOCYTES NFR BLD AUTO: 18.9 % (ref 19.6–45.3)
MAGNESIUM SERPL-MCNC: 1.5 MG/DL (ref 1.6–2.4)
MCH RBC QN AUTO: 30.8 PG (ref 26.6–33)
MCHC RBC AUTO-ENTMCNC: 30.6 G/DL (ref 31.5–35.7)
MCV RBC AUTO: 100.6 FL (ref 79–97)
MONOCYTES # BLD AUTO: 0.83 10*3/MM3 (ref 0.1–0.9)
MONOCYTES NFR BLD AUTO: 9.1 % (ref 5–12)
NEUTROPHILS NFR BLD AUTO: 5.89 10*3/MM3 (ref 1.7–7)
NEUTROPHILS NFR BLD AUTO: 64.8 % (ref 42.7–76)
NRBC BLD AUTO-RTO: 0.2 /100 WBC (ref 0–0.2)
PLATELET # BLD AUTO: 261 10*3/MM3 (ref 140–450)
PMV BLD AUTO: 10.8 FL (ref 6–12)
POTASSIUM SERPL-SCNC: 5.3 MMOL/L (ref 3.5–5.2)
QT INTERVAL: 400 MS
QTC INTERVAL: 406 MS
RBC # BLD AUTO: 3.38 10*6/MM3 (ref 3.77–5.28)
SODIUM SERPL-SCNC: 131 MMOL/L (ref 136–145)
UNIT  ABO: NORMAL
UNIT  RH: NORMAL
WBC # BLD AUTO: 9.1 10*3/MM3 (ref 3.4–10.8)

## 2021-04-12 PROCEDURE — 63710000001 INSULIN DETEMIR PER 5 UNITS: Performed by: NURSE PRACTITIONER

## 2021-04-12 PROCEDURE — 25010000002 CEFTRIAXONE PER 250 MG: Performed by: PHYSICIAN ASSISTANT

## 2021-04-12 PROCEDURE — 97110 THERAPEUTIC EXERCISES: CPT

## 2021-04-12 PROCEDURE — 85025 COMPLETE CBC W/AUTO DIFF WBC: CPT | Performed by: NURSE PRACTITIONER

## 2021-04-12 PROCEDURE — 97164 PT RE-EVAL EST PLAN CARE: CPT

## 2021-04-12 PROCEDURE — 63710000001 INSULIN LISPRO (HUMAN) PER 5 UNITS: Performed by: NURSE PRACTITIONER

## 2021-04-12 PROCEDURE — 82962 GLUCOSE BLOOD TEST: CPT

## 2021-04-12 PROCEDURE — 97597 DBRDMT OPN WND 1ST 20 CM/<: CPT

## 2021-04-12 PROCEDURE — 25010000002 HEPARIN (PORCINE) PER 1000 UNITS: Performed by: PHYSICIAN ASSISTANT

## 2021-04-12 PROCEDURE — 80048 BASIC METABOLIC PNL TOTAL CA: CPT | Performed by: NURSE PRACTITIONER

## 2021-04-12 PROCEDURE — 99232 SBSQ HOSP IP/OBS MODERATE 35: CPT | Performed by: INTERNAL MEDICINE

## 2021-04-12 PROCEDURE — 83735 ASSAY OF MAGNESIUM: CPT | Performed by: NURSE PRACTITIONER

## 2021-04-12 PROCEDURE — 99231 SBSQ HOSP IP/OBS SF/LOW 25: CPT | Performed by: THORACIC SURGERY (CARDIOTHORACIC VASCULAR SURGERY)

## 2021-04-12 RX ADMIN — INSULIN LISPRO 14 UNITS: 100 INJECTION, SOLUTION INTRAVENOUS; SUBCUTANEOUS at 12:20

## 2021-04-12 RX ADMIN — INSULIN LISPRO 8 UNITS: 100 INJECTION, SOLUTION INTRAVENOUS; SUBCUTANEOUS at 18:06

## 2021-04-12 RX ADMIN — INSULIN DETEMIR 10 UNITS: 100 INJECTION, SOLUTION SUBCUTANEOUS at 21:35

## 2021-04-12 RX ADMIN — INSULIN LISPRO 5 UNITS: 100 INJECTION, SOLUTION INTRAVENOUS; SUBCUTANEOUS at 08:22

## 2021-04-12 RX ADMIN — METRONIDAZOLE 500 MG: 500 TABLET ORAL at 21:32

## 2021-04-12 RX ADMIN — SODIUM CHLORIDE, PRESERVATIVE FREE 10 ML: 5 INJECTION INTRAVENOUS at 21:33

## 2021-04-12 RX ADMIN — METRONIDAZOLE 500 MG: 500 TABLET ORAL at 05:29

## 2021-04-12 RX ADMIN — METRONIDAZOLE 500 MG: 500 TABLET ORAL at 16:10

## 2021-04-12 RX ADMIN — LISINOPRIL 5 MG: 5 TABLET ORAL at 08:22

## 2021-04-12 RX ADMIN — SODIUM CHLORIDE 2 G: 900 INJECTION INTRAVENOUS at 08:24

## 2021-04-12 RX ADMIN — HEPARIN SODIUM 5000 UNITS: 5000 INJECTION, SOLUTION INTRAVENOUS; SUBCUTANEOUS at 21:32

## 2021-04-12 RX ADMIN — INSULIN LISPRO 12 UNITS: 100 INJECTION, SOLUTION INTRAVENOUS; SUBCUTANEOUS at 21:33

## 2021-04-12 RX ADMIN — PREGABALIN 200 MG: 100 CAPSULE ORAL at 21:32

## 2021-04-12 RX ADMIN — HEPARIN SODIUM 5000 UNITS: 5000 INJECTION, SOLUTION INTRAVENOUS; SUBCUTANEOUS at 05:29

## 2021-04-12 RX ADMIN — ALLOPURINOL 300 MG: 300 TABLET ORAL at 08:22

## 2021-04-12 RX ADMIN — FLUTICASONE PROPIONATE 2 SPRAY: 50 SPRAY, METERED NASAL at 08:22

## 2021-04-12 RX ADMIN — PREGABALIN 100 MG: 100 CAPSULE ORAL at 08:22

## 2021-04-12 RX ADMIN — TRIAMTERENE AND HYDROCHLOROTHIAZIDE 1 TABLET: 37.5; 25 TABLET ORAL at 08:22

## 2021-04-12 RX ADMIN — HEPARIN SODIUM 5000 UNITS: 5000 INJECTION, SOLUTION INTRAVENOUS; SUBCUTANEOUS at 16:10

## 2021-04-13 ENCOUNTER — APPOINTMENT (OUTPATIENT)
Dept: PHYSICAL THERAPY | Facility: HOSPITAL | Age: 81
End: 2021-04-13

## 2021-04-13 ENCOUNTER — ANESTHESIA EVENT (OUTPATIENT)
Dept: PERIOP | Facility: HOSPITAL | Age: 81
End: 2021-04-13

## 2021-04-13 PROBLEM — M86.9 PYOGENIC INFLAMMATION OF BONE: Status: ACTIVE | Noted: 2021-04-08

## 2021-04-13 LAB
ANION GAP SERPL CALCULATED.3IONS-SCNC: 8 MMOL/L (ref 5–15)
BASOPHILS # BLD AUTO: 0.04 10*3/MM3 (ref 0–0.2)
BASOPHILS NFR BLD AUTO: 0.5 % (ref 0–1.5)
BUN SERPL-MCNC: 34 MG/DL (ref 8–23)
BUN/CREAT SERPL: 30.6 (ref 7–25)
CALCIUM SPEC-SCNC: 8 MG/DL (ref 8.6–10.5)
CHLORIDE SERPL-SCNC: 104 MMOL/L (ref 98–107)
CO2 SERPL-SCNC: 21 MMOL/L (ref 22–29)
CREAT SERPL-MCNC: 1.11 MG/DL (ref 0.57–1)
DEPRECATED RDW RBC AUTO: 50.2 FL (ref 37–54)
EOSINOPHIL # BLD AUTO: 0.33 10*3/MM3 (ref 0–0.4)
EOSINOPHIL NFR BLD AUTO: 4.1 % (ref 0.3–6.2)
ERYTHROCYTE [DISTWIDTH] IN BLOOD BY AUTOMATED COUNT: 14 % (ref 12.3–15.4)
GFR SERPL CREATININE-BSD FRML MDRD: 47 ML/MIN/1.73
GLUCOSE BLDC GLUCOMTR-MCNC: 224 MG/DL (ref 70–130)
GLUCOSE BLDC GLUCOMTR-MCNC: 236 MG/DL (ref 70–130)
GLUCOSE BLDC GLUCOMTR-MCNC: 239 MG/DL (ref 70–130)
GLUCOSE BLDC GLUCOMTR-MCNC: 312 MG/DL (ref 70–130)
GLUCOSE SERPL-MCNC: 237 MG/DL (ref 65–99)
HCT VFR BLD AUTO: 33.7 % (ref 34–46.6)
HGB BLD-MCNC: 10.4 G/DL (ref 12–15.9)
IMM GRANULOCYTES # BLD AUTO: 0.14 10*3/MM3 (ref 0–0.05)
IMM GRANULOCYTES NFR BLD AUTO: 1.7 % (ref 0–0.5)
LYMPHOCYTES # BLD AUTO: 1.59 10*3/MM3 (ref 0.7–3.1)
LYMPHOCYTES NFR BLD AUTO: 19.6 % (ref 19.6–45.3)
MCH RBC QN AUTO: 30.5 PG (ref 26.6–33)
MCHC RBC AUTO-ENTMCNC: 30.9 G/DL (ref 31.5–35.7)
MCV RBC AUTO: 98.8 FL (ref 79–97)
MONOCYTES # BLD AUTO: 0.78 10*3/MM3 (ref 0.1–0.9)
MONOCYTES NFR BLD AUTO: 9.6 % (ref 5–12)
NEUTROPHILS NFR BLD AUTO: 5.25 10*3/MM3 (ref 1.7–7)
NEUTROPHILS NFR BLD AUTO: 64.5 % (ref 42.7–76)
NRBC BLD AUTO-RTO: 0 /100 WBC (ref 0–0.2)
PLATELET # BLD AUTO: 265 10*3/MM3 (ref 140–450)
PMV BLD AUTO: 11 FL (ref 6–12)
POTASSIUM SERPL-SCNC: 5.2 MMOL/L (ref 3.5–5.2)
RBC # BLD AUTO: 3.41 10*6/MM3 (ref 3.77–5.28)
SODIUM SERPL-SCNC: 133 MMOL/L (ref 136–145)
WBC # BLD AUTO: 8.13 10*3/MM3 (ref 3.4–10.8)

## 2021-04-13 PROCEDURE — 97602 WOUND(S) CARE NON-SELECTIVE: CPT

## 2021-04-13 PROCEDURE — 99232 SBSQ HOSP IP/OBS MODERATE 35: CPT | Performed by: INTERNAL MEDICINE

## 2021-04-13 PROCEDURE — 97530 THERAPEUTIC ACTIVITIES: CPT

## 2021-04-13 PROCEDURE — 99024 POSTOP FOLLOW-UP VISIT: CPT | Performed by: THORACIC SURGERY (CARDIOTHORACIC VASCULAR SURGERY)

## 2021-04-13 PROCEDURE — 80048 BASIC METABOLIC PNL TOTAL CA: CPT | Performed by: INTERNAL MEDICINE

## 2021-04-13 PROCEDURE — 25010000002 HEPARIN (PORCINE) PER 1000 UNITS: Performed by: PHYSICIAN ASSISTANT

## 2021-04-13 PROCEDURE — 63710000001 INSULIN DETEMIR PER 5 UNITS: Performed by: NURSE PRACTITIONER

## 2021-04-13 PROCEDURE — 63710000001 INSULIN LISPRO (HUMAN) PER 5 UNITS: Performed by: NURSE PRACTITIONER

## 2021-04-13 PROCEDURE — 82962 GLUCOSE BLOOD TEST: CPT

## 2021-04-13 PROCEDURE — 25010000002 CEFTRIAXONE PER 250 MG: Performed by: PHYSICIAN ASSISTANT

## 2021-04-13 PROCEDURE — 63710000001 INSULIN LISPRO (HUMAN) PER 5 UNITS: Performed by: INTERNAL MEDICINE

## 2021-04-13 PROCEDURE — 85025 COMPLETE CBC W/AUTO DIFF WBC: CPT | Performed by: INTERNAL MEDICINE

## 2021-04-13 RX ORDER — SODIUM CHLORIDE, SODIUM LACTATE, POTASSIUM CHLORIDE, CALCIUM CHLORIDE 600; 310; 30; 20 MG/100ML; MG/100ML; MG/100ML; MG/100ML
9 INJECTION, SOLUTION INTRAVENOUS CONTINUOUS
Status: CANCELLED | OUTPATIENT
Start: 2021-04-13

## 2021-04-13 RX ORDER — DEXTROSE MONOHYDRATE 25 G/50ML
25 INJECTION, SOLUTION INTRAVENOUS
Status: DISCONTINUED | OUTPATIENT
Start: 2021-04-13 | End: 2021-04-15 | Stop reason: ALTCHOICE

## 2021-04-13 RX ORDER — NICOTINE POLACRILEX 4 MG
15 LOZENGE BUCCAL
Status: DISCONTINUED | OUTPATIENT
Start: 2021-04-13 | End: 2021-04-15 | Stop reason: ALTCHOICE

## 2021-04-13 RX ADMIN — METRONIDAZOLE 500 MG: 500 TABLET ORAL at 14:47

## 2021-04-13 RX ADMIN — SODIUM CHLORIDE, PRESERVATIVE FREE 10 ML: 5 INJECTION INTRAVENOUS at 21:30

## 2021-04-13 RX ADMIN — FLUTICASONE PROPIONATE 2 SPRAY: 50 SPRAY, METERED NASAL at 08:32

## 2021-04-13 RX ADMIN — TRIAMTERENE AND HYDROCHLOROTHIAZIDE 1 TABLET: 37.5; 25 TABLET ORAL at 08:28

## 2021-04-13 RX ADMIN — PREGABALIN 100 MG: 100 CAPSULE ORAL at 08:28

## 2021-04-13 RX ADMIN — INSULIN DETEMIR 10 UNITS: 100 INJECTION, SOLUTION SUBCUTANEOUS at 21:30

## 2021-04-13 RX ADMIN — PREGABALIN 200 MG: 100 CAPSULE ORAL at 21:29

## 2021-04-13 RX ADMIN — HEPARIN SODIUM 5000 UNITS: 5000 INJECTION, SOLUTION INTRAVENOUS; SUBCUTANEOUS at 14:47

## 2021-04-13 RX ADMIN — INSULIN LISPRO 5 UNITS: 100 INJECTION, SOLUTION INTRAVENOUS; SUBCUTANEOUS at 12:22

## 2021-04-13 RX ADMIN — INSULIN LISPRO 5 UNITS: 100 INJECTION, SOLUTION INTRAVENOUS; SUBCUTANEOUS at 08:28

## 2021-04-13 RX ADMIN — INSULIN LISPRO 5 UNITS: 100 INJECTION, SOLUTION INTRAVENOUS; SUBCUTANEOUS at 18:29

## 2021-04-13 RX ADMIN — INSULIN LISPRO 10 UNITS: 100 INJECTION, SOLUTION INTRAVENOUS; SUBCUTANEOUS at 21:30

## 2021-04-13 RX ADMIN — ALLOPURINOL 300 MG: 300 TABLET ORAL at 08:28

## 2021-04-13 RX ADMIN — HEPARIN SODIUM 5000 UNITS: 5000 INJECTION, SOLUTION INTRAVENOUS; SUBCUTANEOUS at 21:29

## 2021-04-13 RX ADMIN — TRAMADOL HYDROCHLORIDE 50 MG: 50 TABLET, FILM COATED ORAL at 12:30

## 2021-04-13 RX ADMIN — INSULIN LISPRO 5 UNITS: 100 INJECTION, SOLUTION INTRAVENOUS; SUBCUTANEOUS at 12:23

## 2021-04-13 RX ADMIN — INSULIN LISPRO 5 UNITS: 100 INJECTION, SOLUTION INTRAVENOUS; SUBCUTANEOUS at 18:30

## 2021-04-13 RX ADMIN — METRONIDAZOLE 500 MG: 500 TABLET ORAL at 06:47

## 2021-04-13 RX ADMIN — HEPARIN SODIUM 5000 UNITS: 5000 INJECTION, SOLUTION INTRAVENOUS; SUBCUTANEOUS at 06:47

## 2021-04-13 RX ADMIN — LISINOPRIL 5 MG: 5 TABLET ORAL at 08:28

## 2021-04-13 RX ADMIN — INSULIN LISPRO 5 UNITS: 100 INJECTION, SOLUTION INTRAVENOUS; SUBCUTANEOUS at 08:29

## 2021-04-13 RX ADMIN — SODIUM CHLORIDE 2 G: 900 INJECTION INTRAVENOUS at 08:29

## 2021-04-13 RX ADMIN — SODIUM CHLORIDE, PRESERVATIVE FREE 10 ML: 5 INJECTION INTRAVENOUS at 08:32

## 2021-04-13 RX ADMIN — METRONIDAZOLE 500 MG: 500 TABLET ORAL at 21:29

## 2021-04-14 ENCOUNTER — ANESTHESIA EVENT CONVERTED (OUTPATIENT)
Dept: ANESTHESIOLOGY | Facility: HOSPITAL | Age: 81
End: 2021-04-14

## 2021-04-14 ENCOUNTER — ANESTHESIA (OUTPATIENT)
Dept: PERIOP | Facility: HOSPITAL | Age: 81
End: 2021-04-14

## 2021-04-14 LAB
ANION GAP SERPL CALCULATED.3IONS-SCNC: 8 MMOL/L (ref 5–15)
BASOPHILS # BLD AUTO: 0.04 10*3/MM3 (ref 0–0.2)
BASOPHILS NFR BLD AUTO: 0.5 % (ref 0–1.5)
BUN SERPL-MCNC: 34 MG/DL (ref 8–23)
BUN/CREAT SERPL: 30.1 (ref 7–25)
CALCIUM SPEC-SCNC: 8.1 MG/DL (ref 8.6–10.5)
CHLORIDE SERPL-SCNC: 105 MMOL/L (ref 98–107)
CO2 SERPL-SCNC: 22 MMOL/L (ref 22–29)
CREAT SERPL-MCNC: 1.13 MG/DL (ref 0.57–1)
DEPRECATED RDW RBC AUTO: 50.5 FL (ref 37–54)
EOSINOPHIL # BLD AUTO: 0.25 10*3/MM3 (ref 0–0.4)
EOSINOPHIL NFR BLD AUTO: 3 % (ref 0.3–6.2)
ERYTHROCYTE [DISTWIDTH] IN BLOOD BY AUTOMATED COUNT: 14 % (ref 12.3–15.4)
GFR SERPL CREATININE-BSD FRML MDRD: 46 ML/MIN/1.73
GLUCOSE BLDC GLUCOMTR-MCNC: 160 MG/DL (ref 70–130)
GLUCOSE BLDC GLUCOMTR-MCNC: 184 MG/DL (ref 70–130)
GLUCOSE BLDC GLUCOMTR-MCNC: 191 MG/DL (ref 70–130)
GLUCOSE BLDC GLUCOMTR-MCNC: 274 MG/DL (ref 70–130)
GLUCOSE BLDC GLUCOMTR-MCNC: 513 MG/DL (ref 70–130)
GLUCOSE BLDC GLUCOMTR-MCNC: 556 MG/DL (ref 70–130)
GLUCOSE BLDC GLUCOMTR-MCNC: 579 MG/DL (ref 70–130)
GLUCOSE SERPL-MCNC: 168 MG/DL (ref 65–99)
HCT VFR BLD AUTO: 34.4 % (ref 34–46.6)
HGB BLD-MCNC: 10.7 G/DL (ref 12–15.9)
IMM GRANULOCYTES # BLD AUTO: 0.16 10*3/MM3 (ref 0–0.05)
IMM GRANULOCYTES NFR BLD AUTO: 1.9 % (ref 0–0.5)
LYMPHOCYTES # BLD AUTO: 1.82 10*3/MM3 (ref 0.7–3.1)
LYMPHOCYTES NFR BLD AUTO: 22 % (ref 19.6–45.3)
MCH RBC QN AUTO: 30.8 PG (ref 26.6–33)
MCHC RBC AUTO-ENTMCNC: 31.1 G/DL (ref 31.5–35.7)
MCV RBC AUTO: 99.1 FL (ref 79–97)
MONOCYTES # BLD AUTO: 0.79 10*3/MM3 (ref 0.1–0.9)
MONOCYTES NFR BLD AUTO: 9.5 % (ref 5–12)
NEUTROPHILS NFR BLD AUTO: 5.22 10*3/MM3 (ref 1.7–7)
NEUTROPHILS NFR BLD AUTO: 63.1 % (ref 42.7–76)
NRBC BLD AUTO-RTO: 0 /100 WBC (ref 0–0.2)
PLATELET # BLD AUTO: 258 10*3/MM3 (ref 140–450)
PMV BLD AUTO: 10.5 FL (ref 6–12)
POTASSIUM SERPL-SCNC: 5.3 MMOL/L (ref 3.5–5.2)
RBC # BLD AUTO: 3.47 10*6/MM3 (ref 3.77–5.28)
SODIUM SERPL-SCNC: 135 MMOL/L (ref 136–145)
WBC # BLD AUTO: 8.28 10*3/MM3 (ref 3.4–10.8)

## 2021-04-14 PROCEDURE — 63710000001 INSULIN LISPRO (HUMAN) PER 5 UNITS: Performed by: INTERNAL MEDICINE

## 2021-04-14 PROCEDURE — 99232 SBSQ HOSP IP/OBS MODERATE 35: CPT | Performed by: INTERNAL MEDICINE

## 2021-04-14 PROCEDURE — 63710000001 INSULIN LISPRO (HUMAN) PER 5 UNITS: Performed by: PHYSICIAN ASSISTANT

## 2021-04-14 PROCEDURE — 63710000001 INSULIN LISPRO (HUMAN) PER 5 UNITS: Performed by: NURSE PRACTITIONER

## 2021-04-14 PROCEDURE — 0Y6M0Z9 DETACHMENT AT RIGHT FOOT, PARTIAL 1ST RAY, OPEN APPROACH: ICD-10-PCS | Performed by: THORACIC SURGERY (CARDIOTHORACIC VASCULAR SURGERY)

## 2021-04-14 PROCEDURE — 80048 BASIC METABOLIC PNL TOTAL CA: CPT | Performed by: INTERNAL MEDICINE

## 2021-04-14 PROCEDURE — 76942 ECHO GUIDE FOR BIOPSY: CPT | Performed by: THORACIC SURGERY (CARDIOTHORACIC VASCULAR SURGERY)

## 2021-04-14 PROCEDURE — 87070 CULTURE OTHR SPECIMN AEROBIC: CPT | Performed by: THORACIC SURGERY (CARDIOTHORACIC VASCULAR SURGERY)

## 2021-04-14 PROCEDURE — 25010000002 DEXAMETHASONE SODIUM PHOSPHATE 10 MG/ML SOLUTION: Performed by: NURSE ANESTHETIST, CERTIFIED REGISTERED

## 2021-04-14 PROCEDURE — 28820 AMPUTATION OF TOE: CPT | Performed by: THORACIC SURGERY (CARDIOTHORACIC VASCULAR SURGERY)

## 2021-04-14 PROCEDURE — 87176 TISSUE HOMOGENIZATION CULTR: CPT | Performed by: THORACIC SURGERY (CARDIOTHORACIC VASCULAR SURGERY)

## 2021-04-14 PROCEDURE — 25010000002 PROPOFOL 10 MG/ML EMULSION: Performed by: NURSE ANESTHETIST, CERTIFIED REGISTERED

## 2021-04-14 PROCEDURE — 25010000002 HEPARIN (PORCINE) PER 1000 UNITS: Performed by: PHYSICIAN ASSISTANT

## 2021-04-14 PROCEDURE — 25010000002 ONDANSETRON PER 1 MG: Performed by: NURSE ANESTHETIST, CERTIFIED REGISTERED

## 2021-04-14 PROCEDURE — 87205 SMEAR GRAM STAIN: CPT | Performed by: THORACIC SURGERY (CARDIOTHORACIC VASCULAR SURGERY)

## 2021-04-14 PROCEDURE — 82962 GLUCOSE BLOOD TEST: CPT

## 2021-04-14 PROCEDURE — 63710000001 INSULIN DETEMIR PER 5 UNITS: Performed by: PHYSICIAN ASSISTANT

## 2021-04-14 PROCEDURE — 88304 TISSUE EXAM BY PATHOLOGIST: CPT | Performed by: THORACIC SURGERY (CARDIOTHORACIC VASCULAR SURGERY)

## 2021-04-14 PROCEDURE — 87075 CULTR BACTERIA EXCEPT BLOOD: CPT | Performed by: THORACIC SURGERY (CARDIOTHORACIC VASCULAR SURGERY)

## 2021-04-14 PROCEDURE — 25010000002 FENTANYL CITRATE (PF) 100 MCG/2ML SOLUTION: Performed by: NURSE ANESTHETIST, CERTIFIED REGISTERED

## 2021-04-14 PROCEDURE — 85025 COMPLETE CBC W/AUTO DIFF WBC: CPT | Performed by: INTERNAL MEDICINE

## 2021-04-14 PROCEDURE — 25010000002 CEFTRIAXONE PER 250 MG: Performed by: PHYSICIAN ASSISTANT

## 2021-04-14 PROCEDURE — 88311 DECALCIFY TISSUE: CPT | Performed by: THORACIC SURGERY (CARDIOTHORACIC VASCULAR SURGERY)

## 2021-04-14 PROCEDURE — 88305 TISSUE EXAM BY PATHOLOGIST: CPT | Performed by: THORACIC SURGERY (CARDIOTHORACIC VASCULAR SURGERY)

## 2021-04-14 PROCEDURE — 25010000002 DEXAMETHASONE PER 1 MG: Performed by: NURSE ANESTHETIST, CERTIFIED REGISTERED

## 2021-04-14 RX ORDER — DEXAMETHASONE SODIUM PHOSPHATE 4 MG/ML
INJECTION, SOLUTION INTRA-ARTICULAR; INTRALESIONAL; INTRAMUSCULAR; INTRAVENOUS; SOFT TISSUE AS NEEDED
Status: DISCONTINUED | OUTPATIENT
Start: 2021-04-14 | End: 2021-04-14 | Stop reason: SURG

## 2021-04-14 RX ORDER — ONDANSETRON 2 MG/ML
INJECTION INTRAMUSCULAR; INTRAVENOUS AS NEEDED
Status: DISCONTINUED | OUTPATIENT
Start: 2021-04-14 | End: 2021-04-14 | Stop reason: SURG

## 2021-04-14 RX ORDER — SODIUM CHLORIDE 0.9 % (FLUSH) 0.9 %
10 SYRINGE (ML) INJECTION AS NEEDED
Status: DISCONTINUED | OUTPATIENT
Start: 2021-04-14 | End: 2021-04-14 | Stop reason: HOSPADM

## 2021-04-14 RX ORDER — HYDROCODONE BITARTRATE AND ACETAMINOPHEN 5; 325 MG/1; MG/1
1 TABLET ORAL EVERY 6 HOURS PRN
Status: DISPENSED | OUTPATIENT
Start: 2021-04-14 | End: 2021-04-21

## 2021-04-14 RX ORDER — HYDROCODONE BITARTRATE AND ACETAMINOPHEN 5; 325 MG/1; MG/1
1 TABLET ORAL ONCE AS NEEDED
Status: DISCONTINUED | OUTPATIENT
Start: 2021-04-14 | End: 2021-04-14 | Stop reason: HOSPADM

## 2021-04-14 RX ORDER — MIDAZOLAM HYDROCHLORIDE 1 MG/ML
1 INJECTION INTRAMUSCULAR; INTRAVENOUS
Status: DISCONTINUED | OUTPATIENT
Start: 2021-04-14 | End: 2021-04-14 | Stop reason: HOSPADM

## 2021-04-14 RX ORDER — ONDANSETRON 2 MG/ML
4 INJECTION INTRAMUSCULAR; INTRAVENOUS EVERY 6 HOURS PRN
Status: DISCONTINUED | OUTPATIENT
Start: 2021-04-14 | End: 2021-04-22 | Stop reason: HOSPADM

## 2021-04-14 RX ORDER — LIDOCAINE HYDROCHLORIDE 10 MG/ML
0.5 INJECTION, SOLUTION EPIDURAL; INFILTRATION; INTRACAUDAL; PERINEURAL ONCE AS NEEDED
Status: COMPLETED | OUTPATIENT
Start: 2021-04-14 | End: 2021-04-14

## 2021-04-14 RX ORDER — BUPIVACAINE HYDROCHLORIDE 2.5 MG/ML
INJECTION, SOLUTION EPIDURAL; INFILTRATION; INTRACAUDAL
Status: COMPLETED | OUTPATIENT
Start: 2021-04-14 | End: 2021-04-14

## 2021-04-14 RX ORDER — FENTANYL CITRATE 50 UG/ML
50 INJECTION, SOLUTION INTRAMUSCULAR; INTRAVENOUS
Status: DISCONTINUED | OUTPATIENT
Start: 2021-04-14 | End: 2021-04-14 | Stop reason: HOSPADM

## 2021-04-14 RX ORDER — ONDANSETRON 2 MG/ML
4 INJECTION INTRAMUSCULAR; INTRAVENOUS ONCE AS NEEDED
Status: DISCONTINUED | OUTPATIENT
Start: 2021-04-14 | End: 2021-04-14 | Stop reason: HOSPADM

## 2021-04-14 RX ORDER — ONDANSETRON 4 MG/1
4 TABLET, FILM COATED ORAL EVERY 6 HOURS PRN
Status: DISCONTINUED | OUTPATIENT
Start: 2021-04-14 | End: 2021-04-22 | Stop reason: HOSPADM

## 2021-04-14 RX ORDER — FENTANYL CITRATE 50 UG/ML
INJECTION, SOLUTION INTRAMUSCULAR; INTRAVENOUS AS NEEDED
Status: DISCONTINUED | OUTPATIENT
Start: 2021-04-14 | End: 2021-04-14 | Stop reason: SURG

## 2021-04-14 RX ORDER — PROPOFOL 10 MG/ML
VIAL (ML) INTRAVENOUS AS NEEDED
Status: DISCONTINUED | OUTPATIENT
Start: 2021-04-14 | End: 2021-04-14 | Stop reason: SURG

## 2021-04-14 RX ORDER — HYDROCODONE BITARTRATE AND ACETAMINOPHEN 7.5; 325 MG/1; MG/1
2 TABLET ORAL EVERY 4 HOURS PRN
Status: DISCONTINUED | OUTPATIENT
Start: 2021-04-14 | End: 2021-04-14 | Stop reason: HOSPADM

## 2021-04-14 RX ORDER — DEXAMETHASONE SODIUM PHOSPHATE 10 MG/ML
INJECTION, SOLUTION INTRAMUSCULAR; INTRAVENOUS
Status: COMPLETED | OUTPATIENT
Start: 2021-04-14 | End: 2021-04-14

## 2021-04-14 RX ORDER — IPRATROPIUM BROMIDE AND ALBUTEROL SULFATE 2.5; .5 MG/3ML; MG/3ML
3 SOLUTION RESPIRATORY (INHALATION) ONCE AS NEEDED
Status: DISCONTINUED | OUTPATIENT
Start: 2021-04-14 | End: 2021-04-14 | Stop reason: HOSPADM

## 2021-04-14 RX ORDER — MIDAZOLAM HYDROCHLORIDE 1 MG/ML
0.5 INJECTION INTRAMUSCULAR; INTRAVENOUS
Status: DISCONTINUED | OUTPATIENT
Start: 2021-04-14 | End: 2021-04-14 | Stop reason: HOSPADM

## 2021-04-14 RX ORDER — MORPHINE SULFATE 2 MG/ML
2 INJECTION, SOLUTION INTRAMUSCULAR; INTRAVENOUS EVERY 4 HOURS PRN
Status: ACTIVE | OUTPATIENT
Start: 2021-04-14 | End: 2021-04-21

## 2021-04-14 RX ORDER — ACETAMINOPHEN 650 MG/1
650 SUPPOSITORY RECTAL ONCE AS NEEDED
Status: DISCONTINUED | OUTPATIENT
Start: 2021-04-14 | End: 2021-04-14 | Stop reason: HOSPADM

## 2021-04-14 RX ORDER — LIDOCAINE HYDROCHLORIDE 10 MG/ML
INJECTION, SOLUTION EPIDURAL; INFILTRATION; INTRACAUDAL; PERINEURAL AS NEEDED
Status: DISCONTINUED | OUTPATIENT
Start: 2021-04-14 | End: 2021-04-14 | Stop reason: SURG

## 2021-04-14 RX ORDER — ACETAMINOPHEN 325 MG/1
650 TABLET ORAL ONCE AS NEEDED
Status: DISCONTINUED | OUTPATIENT
Start: 2021-04-14 | End: 2021-04-14 | Stop reason: HOSPADM

## 2021-04-14 RX ORDER — AMOXICILLIN 250 MG
2 CAPSULE ORAL 2 TIMES DAILY PRN
Status: DISCONTINUED | OUTPATIENT
Start: 2021-04-14 | End: 2021-04-22 | Stop reason: HOSPADM

## 2021-04-14 RX ORDER — SODIUM CHLORIDE 450 MG/100ML
50 INJECTION, SOLUTION INTRAVENOUS CONTINUOUS
Status: DISCONTINUED | OUTPATIENT
Start: 2021-04-14 | End: 2021-04-15

## 2021-04-14 RX ORDER — SODIUM CHLORIDE 0.9 % (FLUSH) 0.9 %
10 SYRINGE (ML) INJECTION EVERY 12 HOURS SCHEDULED
Status: DISCONTINUED | OUTPATIENT
Start: 2021-04-14 | End: 2021-04-14 | Stop reason: HOSPADM

## 2021-04-14 RX ORDER — HYDROMORPHONE HYDROCHLORIDE 1 MG/ML
0.5 INJECTION, SOLUTION INTRAMUSCULAR; INTRAVENOUS; SUBCUTANEOUS
Status: DISCONTINUED | OUTPATIENT
Start: 2021-04-14 | End: 2021-04-14 | Stop reason: HOSPADM

## 2021-04-14 RX ORDER — FAMOTIDINE 20 MG/1
20 TABLET, FILM COATED ORAL ONCE
Status: COMPLETED | OUTPATIENT
Start: 2021-04-14 | End: 2021-04-14

## 2021-04-14 RX ORDER — SODIUM CHLORIDE 9 MG/ML
9 INJECTION, SOLUTION INTRAVENOUS CONTINUOUS
Status: DISCONTINUED | OUTPATIENT
Start: 2021-04-14 | End: 2021-04-15

## 2021-04-14 RX ORDER — FAMOTIDINE 10 MG/ML
20 INJECTION, SOLUTION INTRAVENOUS ONCE
Status: DISCONTINUED | OUTPATIENT
Start: 2021-04-14 | End: 2021-04-14 | Stop reason: HOSPADM

## 2021-04-14 RX ORDER — EPHEDRINE SULFATE 50 MG/ML
INJECTION, SOLUTION INTRAVENOUS AS NEEDED
Status: DISCONTINUED | OUTPATIENT
Start: 2021-04-14 | End: 2021-04-14 | Stop reason: SURG

## 2021-04-14 RX ADMIN — INSULIN DETEMIR 10 UNITS: 100 INJECTION, SOLUTION SUBCUTANEOUS at 22:43

## 2021-04-14 RX ADMIN — PREGABALIN 100 MG: 100 CAPSULE ORAL at 09:30

## 2021-04-14 RX ADMIN — FLUTICASONE PROPIONATE 2 SPRAY: 50 SPRAY, METERED NASAL at 09:29

## 2021-04-14 RX ADMIN — EPHEDRINE SULFATE 10 MG: 50 INJECTION, SOLUTION INTRAVENOUS at 13:01

## 2021-04-14 RX ADMIN — LIDOCAINE HYDROCHLORIDE 50 MG: 10 INJECTION, SOLUTION EPIDURAL; INFILTRATION; INTRACAUDAL; PERINEURAL at 12:43

## 2021-04-14 RX ADMIN — METRONIDAZOLE 500 MG: 500 TABLET ORAL at 21:05

## 2021-04-14 RX ADMIN — PROPOFOL 150 MG: 10 INJECTION, EMULSION INTRAVENOUS at 12:43

## 2021-04-14 RX ADMIN — EPHEDRINE SULFATE 10 MG: 50 INJECTION, SOLUTION INTRAVENOUS at 13:26

## 2021-04-14 RX ADMIN — INSULIN DETEMIR 10 UNITS: 100 INJECTION, SOLUTION SUBCUTANEOUS at 21:03

## 2021-04-14 RX ADMIN — DEXAMETHASONE SODIUM PHOSPHATE 4 MG: 4 INJECTION, SOLUTION INTRA-ARTICULAR; INTRALESIONAL; INTRAMUSCULAR; INTRAVENOUS; SOFT TISSUE at 12:46

## 2021-04-14 RX ADMIN — TRIAMTERENE AND HYDROCHLOROTHIAZIDE 1 TABLET: 37.5; 25 TABLET ORAL at 09:29

## 2021-04-14 RX ADMIN — SODIUM CHLORIDE 50 ML/HR: 4.5 INJECTION, SOLUTION INTRAVENOUS at 18:26

## 2021-04-14 RX ADMIN — FENTANYL CITRATE 50 MCG: 50 INJECTION, SOLUTION INTRAMUSCULAR; INTRAVENOUS at 12:43

## 2021-04-14 RX ADMIN — SODIUM CHLORIDE 2 G: 900 INJECTION INTRAVENOUS at 09:29

## 2021-04-14 RX ADMIN — SODIUM CHLORIDE: 9 INJECTION, SOLUTION INTRAVENOUS at 12:37

## 2021-04-14 RX ADMIN — ALLOPURINOL 300 MG: 300 TABLET ORAL at 09:30

## 2021-04-14 RX ADMIN — SODIUM CHLORIDE 9 ML/HR: 9 INJECTION, SOLUTION INTRAVENOUS at 10:48

## 2021-04-14 RX ADMIN — BUPIVACAINE HYDROCHLORIDE 30 ML: 2.5 INJECTION, SOLUTION EPIDURAL; INFILTRATION; INTRACAUDAL; PERINEURAL at 11:20

## 2021-04-14 RX ADMIN — INSULIN LISPRO 14 UNITS: 100 INJECTION, SOLUTION INTRAVENOUS; SUBCUTANEOUS at 21:04

## 2021-04-14 RX ADMIN — LIDOCAINE HYDROCHLORIDE 0.5 ML: 10 INJECTION, SOLUTION EPIDURAL; INFILTRATION; INTRACAUDAL; PERINEURAL at 10:48

## 2021-04-14 RX ADMIN — HEPARIN SODIUM 5000 UNITS: 5000 INJECTION, SOLUTION INTRAVENOUS; SUBCUTANEOUS at 21:06

## 2021-04-14 RX ADMIN — DEXAMETHASONE SODIUM PHOSPHATE 2 MG: 10 INJECTION, SOLUTION INTRAMUSCULAR; INTRAVENOUS at 11:20

## 2021-04-14 RX ADMIN — METRONIDAZOLE 500 MG: 500 TABLET ORAL at 05:57

## 2021-04-14 RX ADMIN — INSULIN LISPRO 5 UNITS: 100 INJECTION, SOLUTION INTRAVENOUS; SUBCUTANEOUS at 18:26

## 2021-04-14 RX ADMIN — FAMOTIDINE 20 MG: 20 TABLET ORAL at 10:48

## 2021-04-14 RX ADMIN — ONDANSETRON 4 MG: 2 INJECTION INTRAMUSCULAR; INTRAVENOUS at 14:05

## 2021-04-14 RX ADMIN — PREGABALIN 200 MG: 100 CAPSULE ORAL at 21:05

## 2021-04-14 RX ADMIN — FENTANYL CITRATE 50 MCG: 50 INJECTION, SOLUTION INTRAMUSCULAR; INTRAVENOUS at 12:37

## 2021-04-14 RX ADMIN — INSULIN LISPRO 3 UNITS: 100 INJECTION, SOLUTION INTRAVENOUS; SUBCUTANEOUS at 09:28

## 2021-04-14 RX ADMIN — INSULIN LISPRO 8 UNITS: 100 INJECTION, SOLUTION INTRAVENOUS; SUBCUTANEOUS at 18:25

## 2021-04-14 RX ADMIN — INSULIN LISPRO 5 UNITS: 100 INJECTION, SOLUTION INTRAVENOUS; SUBCUTANEOUS at 09:30

## 2021-04-14 NOTE — ANESTHESIA PROCEDURE NOTES
Airway  Urgency: elective    Date/Time: 4/14/2021 12:43 PM  End Time:4/14/2021 12:44 PM  Airway not difficult    General Information and Staff    Patient location during procedure: OR  CRNA: Teo Eller CRNA    Indications and Patient Condition  Indications for airway management: airway protection    Preoxygenated: yes  Mask difficulty assessment: 1 - vent by mask    Final Airway Details  Final airway type: supraglottic airway      Successful airway: I-gel  Size 4    Number of attempts at approach: 1  Assessment: lips, teeth, and gum same as pre-op    Additional Comments  LMA placed without difficulty, ventilation with assist, equal breath sounds and symmetric chest rise and fall

## 2021-04-14 NOTE — ANESTHESIA PREPROCEDURE EVALUATION
Anesthesia Evaluation     Patient summary reviewed and Nursing notes reviewed                Airway   Mallampati: II  Dental      Pulmonary - negative pulmonary ROS   Cardiovascular     (+) hypertension,       Neuro/Psych- negative ROS  GI/Hepatic/Renal/Endo    (+)   diabetes mellitus,     Musculoskeletal (-) negative ROS    Abdominal    Substance History - negative use     OB/GYN negative ob/gyn ROS         Other                        Anesthesia Plan    ASA 3     general with block     intravenous induction     Anesthetic plan, all risks, benefits, and alternatives have been provided, discussed and informed consent has been obtained with: patient.

## 2021-04-14 NOTE — ANESTHESIA PROCEDURE NOTES
Popliteal single shot      Patient reassessed immediately prior to procedure    Patient location during procedure: pre-op  Reason for block: at surgeon's request and post-op pain management  Performed by  CRNA: Jay Leo CRNA  Assisted by: Desiree Hu RN  Preanesthetic Checklist  Completed: patient identified, IV checked, site marked, risks and benefits discussed, surgical consent, monitors and equipment checked, pre-op evaluation and timeout performed  Prep:  Pt Position: left lateral decubitus  Sterile barriers:cap, gloves, mask and sterile barriers  Prep: ChloraPrep  Patient monitoring: blood pressure monitoring, continuous pulse oximetry and EKG  Procedure  Sedation:yes  Performed under: local infiltration  Guidance:ultrasound guided  Images:still images obtained, printed/placed on chart    Laterality:right  Block Type:popliteal  Injection Technique:single-shot  Needle Type:echogenic  Needle Gauge:21 G  Resistance on Injection: none    Medications Used: dexamethasone sodium phosphate injection, 2 mg  bupivacaine PF (MARCAINE) 0.25 % injection, 30 mL  Med admintered at 4/14/2021 11:20 AM      Post Assessment  Injection Assessment: negative aspiration for heme, no paresthesia on injection and incremental injection  Patient Tolerance:comfortable throughout block  Complications:no  Additional Notes  Procedure:                                                         The pt was placed in  lateral position.  The Insertion site was  prepped and Draped in sterile fashion.  The pt was anesthetized with  IV Sedation( see meds).  Skin and cutaneous tissue was infiltrated and anesthetized with 1% Lidocaine 3 mls via a 25g needle.  A BBraun 4 inch 18g echogenic needle was then  inserted approximately 3 cm proximal to the popliteal fossa at the lateral mid biceps femoris and advanced In-plane with Ultrasound guidance.  Normal Saline PSF was utilized for hydrodissection of tissue.  The popliteal artery was  visualized and the common peroneal and tibial bifurcation was located.  LA injection spread was visualized, injection was incremental 1-5ml, injection pressure was normal or little, no intraneural injection, no vascular injection.  .  Thank you

## 2021-04-15 PROBLEM — E87.5 HYPERKALEMIA: Status: ACTIVE | Noted: 2021-04-15

## 2021-04-15 PROBLEM — R73.9 HYPERGLYCEMIA: Status: ACTIVE | Noted: 2021-04-15

## 2021-04-15 LAB
ANION GAP SERPL CALCULATED.3IONS-SCNC: 7 MMOL/L (ref 5–15)
ANION GAP SERPL CALCULATED.3IONS-SCNC: 9 MMOL/L (ref 5–15)
BUN SERPL-MCNC: 37 MG/DL (ref 8–23)
BUN SERPL-MCNC: 37 MG/DL (ref 8–23)
BUN/CREAT SERPL: 28.7 (ref 7–25)
BUN/CREAT SERPL: 32.5 (ref 7–25)
CALCIUM SPEC-SCNC: 7.6 MG/DL (ref 8.6–10.5)
CALCIUM SPEC-SCNC: 8.2 MG/DL (ref 8.6–10.5)
CHLORIDE SERPL-SCNC: 101 MMOL/L (ref 98–107)
CHLORIDE SERPL-SCNC: 98 MMOL/L (ref 98–107)
CO2 SERPL-SCNC: 17 MMOL/L (ref 22–29)
CO2 SERPL-SCNC: 21 MMOL/L (ref 22–29)
CREAT SERPL-MCNC: 1.14 MG/DL (ref 0.57–1)
CREAT SERPL-MCNC: 1.29 MG/DL (ref 0.57–1)
D-LACTATE SERPL-SCNC: 2.1 MMOL/L (ref 0.5–2)
D-LACTATE SERPL-SCNC: 2.1 MMOL/L (ref 0.5–2)
DEPRECATED RDW RBC AUTO: 50.9 FL (ref 37–54)
ERYTHROCYTE [DISTWIDTH] IN BLOOD BY AUTOMATED COUNT: 14 % (ref 12.3–15.4)
GFR SERPL CREATININE-BSD FRML MDRD: 40 ML/MIN/1.73
GFR SERPL CREATININE-BSD FRML MDRD: 46 ML/MIN/1.73
GLUCOSE BLDC GLUCOMTR-MCNC: 186 MG/DL (ref 70–130)
GLUCOSE BLDC GLUCOMTR-MCNC: 258 MG/DL (ref 70–130)
GLUCOSE BLDC GLUCOMTR-MCNC: 259 MG/DL (ref 70–130)
GLUCOSE BLDC GLUCOMTR-MCNC: 289 MG/DL (ref 70–130)
GLUCOSE BLDC GLUCOMTR-MCNC: 290 MG/DL (ref 70–130)
GLUCOSE BLDC GLUCOMTR-MCNC: 291 MG/DL (ref 70–130)
GLUCOSE BLDC GLUCOMTR-MCNC: 296 MG/DL (ref 70–130)
GLUCOSE BLDC GLUCOMTR-MCNC: 321 MG/DL (ref 70–130)
GLUCOSE BLDC GLUCOMTR-MCNC: 324 MG/DL (ref 70–130)
GLUCOSE BLDC GLUCOMTR-MCNC: 336 MG/DL (ref 70–130)
GLUCOSE BLDC GLUCOMTR-MCNC: 345 MG/DL (ref 70–130)
GLUCOSE BLDC GLUCOMTR-MCNC: 361 MG/DL (ref 70–130)
GLUCOSE BLDC GLUCOMTR-MCNC: 390 MG/DL (ref 70–130)
GLUCOSE BLDC GLUCOMTR-MCNC: 435 MG/DL (ref 70–130)
GLUCOSE BLDC GLUCOMTR-MCNC: 483 MG/DL (ref 70–130)
GLUCOSE BLDC GLUCOMTR-MCNC: 495 MG/DL (ref 70–130)
GLUCOSE BLDC GLUCOMTR-MCNC: 522 MG/DL (ref 70–130)
GLUCOSE BLDC GLUCOMTR-MCNC: 524 MG/DL (ref 70–130)
GLUCOSE BLDC GLUCOMTR-MCNC: 550 MG/DL (ref 70–130)
GLUCOSE SERPL-MCNC: 299 MG/DL (ref 65–99)
GLUCOSE SERPL-MCNC: 529 MG/DL (ref 65–99)
HCT VFR BLD AUTO: 32.2 % (ref 34–46.6)
HGB BLD-MCNC: 10.1 G/DL (ref 12–15.9)
MCH RBC QN AUTO: 31.3 PG (ref 26.6–33)
MCHC RBC AUTO-ENTMCNC: 31.4 G/DL (ref 31.5–35.7)
MCV RBC AUTO: 99.7 FL (ref 79–97)
PLATELET # BLD AUTO: 285 10*3/MM3 (ref 140–450)
PMV BLD AUTO: 11 FL (ref 6–12)
POTASSIUM SERPL-SCNC: 4.9 MMOL/L (ref 3.5–5.2)
POTASSIUM SERPL-SCNC: 6.4 MMOL/L (ref 3.5–5.2)
QT INTERVAL: 380 MS
QT INTERVAL: 388 MS
QTC INTERVAL: 416 MS
QTC INTERVAL: 419 MS
RBC # BLD AUTO: 3.23 10*6/MM3 (ref 3.77–5.28)
SODIUM SERPL-SCNC: 124 MMOL/L (ref 136–145)
SODIUM SERPL-SCNC: 129 MMOL/L (ref 136–145)
WBC # BLD AUTO: 10.74 10*3/MM3 (ref 3.4–10.8)

## 2021-04-15 PROCEDURE — 85027 COMPLETE CBC AUTOMATED: CPT | Performed by: PHYSICIAN ASSISTANT

## 2021-04-15 PROCEDURE — 99024 POSTOP FOLLOW-UP VISIT: CPT | Performed by: THORACIC SURGERY (CARDIOTHORACIC VASCULAR SURGERY)

## 2021-04-15 PROCEDURE — 25010000002 HEPARIN (PORCINE) PER 1000 UNITS: Performed by: PHYSICIAN ASSISTANT

## 2021-04-15 PROCEDURE — 93010 ELECTROCARDIOGRAM REPORT: CPT | Performed by: INTERNAL MEDICINE

## 2021-04-15 PROCEDURE — 99233 SBSQ HOSP IP/OBS HIGH 50: CPT | Performed by: INTERNAL MEDICINE

## 2021-04-15 PROCEDURE — 93005 ELECTROCARDIOGRAM TRACING: CPT | Performed by: PHYSICIAN ASSISTANT

## 2021-04-15 PROCEDURE — 63710000001 INSULIN LISPRO (HUMAN) PER 5 UNITS: Performed by: INTERNAL MEDICINE

## 2021-04-15 PROCEDURE — 63710000001 INSULIN DETEMIR PER 5 UNITS: Performed by: INTERNAL MEDICINE

## 2021-04-15 PROCEDURE — 63710000001 INSULIN ISOPHANE HUMAN PER 5 UNITS: Performed by: INTERNAL MEDICINE

## 2021-04-15 PROCEDURE — 82962 GLUCOSE BLOOD TEST: CPT

## 2021-04-15 PROCEDURE — 80048 BASIC METABOLIC PNL TOTAL CA: CPT | Performed by: PHYSICIAN ASSISTANT

## 2021-04-15 PROCEDURE — 25010000002 CALCIUM GLUCONATE PER 10 ML: Performed by: PHYSICIAN ASSISTANT

## 2021-04-15 PROCEDURE — 83605 ASSAY OF LACTIC ACID: CPT | Performed by: PHYSICIAN ASSISTANT

## 2021-04-15 PROCEDURE — 25010000002 CEFTRIAXONE PER 250 MG: Performed by: PHYSICIAN ASSISTANT

## 2021-04-15 RX ORDER — SODIUM CHLORIDE 0.9 % (FLUSH) 0.9 %
10 SYRINGE (ML) INJECTION AS NEEDED
Status: DISCONTINUED | OUTPATIENT
Start: 2021-04-15 | End: 2021-04-15

## 2021-04-15 RX ORDER — SODIUM CHLORIDE 9 MG/ML
250 INJECTION, SOLUTION INTRAVENOUS CONTINUOUS PRN
Status: DISCONTINUED | OUTPATIENT
Start: 2021-04-15 | End: 2021-04-15

## 2021-04-15 RX ORDER — DEXTROSE MONOHYDRATE 25 G/50ML
12.5 INJECTION, SOLUTION INTRAVENOUS AS NEEDED
Status: DISCONTINUED | OUTPATIENT
Start: 2021-04-15 | End: 2021-04-15

## 2021-04-15 RX ORDER — SODIUM CHLORIDE 0.9 % (FLUSH) 0.9 %
10 SYRINGE (ML) INJECTION ONCE AS NEEDED
Status: DISCONTINUED | OUTPATIENT
Start: 2021-04-15 | End: 2021-04-15

## 2021-04-15 RX ORDER — DEXTROSE MONOHYDRATE 25 G/50ML
25 INJECTION, SOLUTION INTRAVENOUS
Status: DISCONTINUED | OUTPATIENT
Start: 2021-04-15 | End: 2021-04-22 | Stop reason: HOSPADM

## 2021-04-15 RX ORDER — SODIUM CHLORIDE 9 MG/ML
10 INJECTION, SOLUTION INTRAVENOUS CONTINUOUS PRN
Status: DISCONTINUED | OUTPATIENT
Start: 2021-04-15 | End: 2021-04-15

## 2021-04-15 RX ORDER — SODIUM CHLORIDE AND POTASSIUM CHLORIDE 150; 900 MG/100ML; MG/100ML
250 INJECTION, SOLUTION INTRAVENOUS CONTINUOUS PRN
Status: DISCONTINUED | OUTPATIENT
Start: 2021-04-15 | End: 2021-04-15

## 2021-04-15 RX ORDER — DEXTROSE AND SODIUM CHLORIDE 5; .9 G/100ML; G/100ML
150 INJECTION, SOLUTION INTRAVENOUS CONTINUOUS PRN
Status: DISCONTINUED | OUTPATIENT
Start: 2021-04-15 | End: 2021-04-15

## 2021-04-15 RX ORDER — DEXTROSE MONOHYDRATE 25 G/50ML
25-50 INJECTION, SOLUTION INTRAVENOUS
Status: DISCONTINUED | OUTPATIENT
Start: 2021-04-15 | End: 2021-04-15

## 2021-04-15 RX ORDER — POTASSIUM CHLORIDE, DEXTROSE MONOHYDRATE AND SODIUM CHLORIDE 300; 5; 900 MG/100ML; G/100ML; MG/100ML
150 INJECTION, SOLUTION INTRAVENOUS CONTINUOUS PRN
Status: DISCONTINUED | OUTPATIENT
Start: 2021-04-15 | End: 2021-04-15

## 2021-04-15 RX ORDER — SODIUM CHLORIDE 9 MG/ML
100 INJECTION, SOLUTION INTRAVENOUS CONTINUOUS
Status: DISCONTINUED | OUTPATIENT
Start: 2021-04-15 | End: 2021-04-15

## 2021-04-15 RX ORDER — SODIUM CHLORIDE 450 MG/100ML
250 INJECTION, SOLUTION INTRAVENOUS CONTINUOUS PRN
Status: DISCONTINUED | OUTPATIENT
Start: 2021-04-15 | End: 2021-04-15

## 2021-04-15 RX ORDER — NICOTINE POLACRILEX 4 MG
15 LOZENGE BUCCAL
Status: DISCONTINUED | OUTPATIENT
Start: 2021-04-15 | End: 2021-04-22 | Stop reason: HOSPADM

## 2021-04-15 RX ORDER — DEXTROSE AND SODIUM CHLORIDE 5; .45 G/100ML; G/100ML
150 INJECTION, SOLUTION INTRAVENOUS CONTINUOUS PRN
Status: DISCONTINUED | OUTPATIENT
Start: 2021-04-15 | End: 2021-04-15

## 2021-04-15 RX ORDER — DEXTROSE, SODIUM CHLORIDE, AND POTASSIUM CHLORIDE 5; .45; .3 G/100ML; G/100ML; G/100ML
150 INJECTION INTRAVENOUS CONTINUOUS PRN
Status: DISCONTINUED | OUTPATIENT
Start: 2021-04-15 | End: 2021-04-15

## 2021-04-15 RX ORDER — DEXTROSE, SODIUM CHLORIDE, AND POTASSIUM CHLORIDE 5; .45; .15 G/100ML; G/100ML; G/100ML
150 INJECTION INTRAVENOUS CONTINUOUS PRN
Status: DISCONTINUED | OUTPATIENT
Start: 2021-04-15 | End: 2021-04-15

## 2021-04-15 RX ORDER — DEXTROSE, SODIUM CHLORIDE, AND POTASSIUM CHLORIDE 5; .9; .15 G/100ML; G/100ML; G/100ML
150 INJECTION INTRAVENOUS CONTINUOUS PRN
Status: DISCONTINUED | OUTPATIENT
Start: 2021-04-15 | End: 2021-04-15

## 2021-04-15 RX ORDER — SODIUM CHLORIDE AND POTASSIUM CHLORIDE 150; 450 MG/100ML; MG/100ML
250 INJECTION, SOLUTION INTRAVENOUS CONTINUOUS PRN
Status: DISCONTINUED | OUTPATIENT
Start: 2021-04-15 | End: 2021-04-15

## 2021-04-15 RX ORDER — SODIUM CHLORIDE 0.9 % (FLUSH) 0.9 %
3 SYRINGE (ML) INJECTION EVERY 12 HOURS SCHEDULED
Status: DISCONTINUED | OUTPATIENT
Start: 2021-04-15 | End: 2021-04-15

## 2021-04-15 RX ORDER — SODIUM CHLORIDE AND POTASSIUM CHLORIDE 300; 900 MG/100ML; MG/100ML
250 INJECTION, SOLUTION INTRAVENOUS CONTINUOUS PRN
Status: DISCONTINUED | OUTPATIENT
Start: 2021-04-15 | End: 2021-04-15

## 2021-04-15 RX ADMIN — TRIAMTERENE AND HYDROCHLOROTHIAZIDE 1 TABLET: 37.5; 25 TABLET ORAL at 09:18

## 2021-04-15 RX ADMIN — METRONIDAZOLE 500 MG: 500 TABLET ORAL at 21:03

## 2021-04-15 RX ADMIN — SODIUM ZIRCONIUM CYCLOSILICATE 10 G: 10 POWDER, FOR SUSPENSION ORAL at 04:10

## 2021-04-15 RX ADMIN — CALCIUM GLUCONATE 1 G: 98 INJECTION, SOLUTION INTRAVENOUS at 04:18

## 2021-04-15 RX ADMIN — INSULIN LISPRO 4 UNITS: 100 INJECTION, SOLUTION INTRAVENOUS; SUBCUTANEOUS at 17:24

## 2021-04-15 RX ADMIN — INSULIN LISPRO 5 UNITS: 100 INJECTION, SOLUTION INTRAVENOUS; SUBCUTANEOUS at 18:00

## 2021-04-15 RX ADMIN — METRONIDAZOLE 500 MG: 500 TABLET ORAL at 15:27

## 2021-04-15 RX ADMIN — SODIUM BICARBONATE 50 MEQ: 84 INJECTION, SOLUTION INTRAVENOUS at 04:11

## 2021-04-15 RX ADMIN — HEPARIN SODIUM 5000 UNITS: 5000 INJECTION, SOLUTION INTRAVENOUS; SUBCUTANEOUS at 21:02

## 2021-04-15 RX ADMIN — METRONIDAZOLE 500 MG: 500 TABLET ORAL at 06:03

## 2021-04-15 RX ADMIN — PREGABALIN 100 MG: 100 CAPSULE ORAL at 09:19

## 2021-04-15 RX ADMIN — HEPARIN SODIUM 5000 UNITS: 5000 INJECTION, SOLUTION INTRAVENOUS; SUBCUTANEOUS at 06:03

## 2021-04-15 RX ADMIN — INSULIN HUMAN 4 UNITS/HR: 1 INJECTION, SOLUTION INTRAVENOUS at 02:27

## 2021-04-15 RX ADMIN — HEPARIN SODIUM 5000 UNITS: 5000 INJECTION, SOLUTION INTRAVENOUS; SUBCUTANEOUS at 15:28

## 2021-04-15 RX ADMIN — INSULIN DETEMIR 18 UNITS: 100 INJECTION, SOLUTION SUBCUTANEOUS at 21:02

## 2021-04-15 RX ADMIN — INSULIN HUMAN 15 UNITS: 100 INJECTION, SUSPENSION SUBCUTANEOUS at 17:55

## 2021-04-15 RX ADMIN — SODIUM CHLORIDE 2 G: 900 INJECTION INTRAVENOUS at 09:18

## 2021-04-15 RX ADMIN — FLUTICASONE PROPIONATE 2 SPRAY: 50 SPRAY, METERED NASAL at 09:19

## 2021-04-15 RX ADMIN — PREGABALIN 200 MG: 100 CAPSULE ORAL at 21:02

## 2021-04-15 RX ADMIN — SODIUM CHLORIDE 100 ML/HR: 9 INJECTION, SOLUTION INTRAVENOUS at 03:37

## 2021-04-15 RX ADMIN — ALLOPURINOL 300 MG: 300 TABLET ORAL at 09:19

## 2021-04-15 RX ADMIN — INSULIN HUMAN 8 UNITS/HR: 1 INJECTION, SOLUTION INTRAVENOUS at 12:51

## 2021-04-16 LAB
ANION GAP SERPL CALCULATED.3IONS-SCNC: 7 MMOL/L (ref 5–15)
BUN SERPL-MCNC: 36 MG/DL (ref 8–23)
BUN/CREAT SERPL: 37.5 (ref 7–25)
CALCIUM SPEC-SCNC: 7.3 MG/DL (ref 8.6–10.5)
CHLORIDE SERPL-SCNC: 106 MMOL/L (ref 98–107)
CO2 SERPL-SCNC: 19 MMOL/L (ref 22–29)
CREAT SERPL-MCNC: 0.96 MG/DL (ref 0.57–1)
GFR SERPL CREATININE-BSD FRML MDRD: 56 ML/MIN/1.73
GLUCOSE BLDC GLUCOMTR-MCNC: 222 MG/DL (ref 70–130)
GLUCOSE BLDC GLUCOMTR-MCNC: 233 MG/DL (ref 70–130)
GLUCOSE BLDC GLUCOMTR-MCNC: 269 MG/DL (ref 70–130)
GLUCOSE BLDC GLUCOMTR-MCNC: 284 MG/DL (ref 70–130)
GLUCOSE SERPL-MCNC: 279 MG/DL (ref 65–99)
POTASSIUM SERPL-SCNC: 5.5 MMOL/L (ref 3.5–5.2)
SODIUM SERPL-SCNC: 132 MMOL/L (ref 136–145)

## 2021-04-16 PROCEDURE — 99024 POSTOP FOLLOW-UP VISIT: CPT | Performed by: THORACIC SURGERY (CARDIOTHORACIC VASCULAR SURGERY)

## 2021-04-16 PROCEDURE — 82962 GLUCOSE BLOOD TEST: CPT

## 2021-04-16 PROCEDURE — 25010000002 HEPARIN (PORCINE) PER 1000 UNITS: Performed by: PHYSICIAN ASSISTANT

## 2021-04-16 PROCEDURE — 25010000002 CEFTRIAXONE PER 250 MG: Performed by: PHYSICIAN ASSISTANT

## 2021-04-16 PROCEDURE — 63710000001 INSULIN DETEMIR PER 5 UNITS: Performed by: INTERNAL MEDICINE

## 2021-04-16 PROCEDURE — 80048 BASIC METABOLIC PNL TOTAL CA: CPT | Performed by: INTERNAL MEDICINE

## 2021-04-16 PROCEDURE — 99233 SBSQ HOSP IP/OBS HIGH 50: CPT | Performed by: INTERNAL MEDICINE

## 2021-04-16 PROCEDURE — 63710000001 INSULIN LISPRO (HUMAN) PER 5 UNITS: Performed by: INTERNAL MEDICINE

## 2021-04-16 RX ORDER — FLUCONAZOLE 200 MG/1
400 TABLET ORAL EVERY 24 HOURS
Status: DISCONTINUED | OUTPATIENT
Start: 2021-04-16 | End: 2021-04-22 | Stop reason: HOSPADM

## 2021-04-16 RX ORDER — CHOLECALCIFEROL (VITAMIN D3) 125 MCG
5 CAPSULE ORAL NIGHTLY PRN
Status: DISCONTINUED | OUTPATIENT
Start: 2021-04-16 | End: 2021-04-22 | Stop reason: HOSPADM

## 2021-04-16 RX ADMIN — INSULIN LISPRO 12 UNITS: 100 INJECTION, SOLUTION INTRAVENOUS; SUBCUTANEOUS at 11:30

## 2021-04-16 RX ADMIN — INSULIN LISPRO 12 UNITS: 100 INJECTION, SOLUTION INTRAVENOUS; SUBCUTANEOUS at 08:32

## 2021-04-16 RX ADMIN — TRIAMTERENE AND HYDROCHLOROTHIAZIDE 1 TABLET: 37.5; 25 TABLET ORAL at 08:33

## 2021-04-16 RX ADMIN — FLUTICASONE PROPIONATE 2 SPRAY: 50 SPRAY, METERED NASAL at 08:33

## 2021-04-16 RX ADMIN — PREGABALIN 200 MG: 100 CAPSULE ORAL at 21:49

## 2021-04-16 RX ADMIN — Medication 5 MG: at 00:11

## 2021-04-16 RX ADMIN — ALLOPURINOL 300 MG: 300 TABLET ORAL at 08:33

## 2021-04-16 RX ADMIN — INSULIN LISPRO 12 UNITS: 100 INJECTION, SOLUTION INTRAVENOUS; SUBCUTANEOUS at 17:28

## 2021-04-16 RX ADMIN — HEPARIN SODIUM 5000 UNITS: 5000 INJECTION, SOLUTION INTRAVENOUS; SUBCUTANEOUS at 06:12

## 2021-04-16 RX ADMIN — FLUCONAZOLE 400 MG: 200 TABLET ORAL at 17:29

## 2021-04-16 RX ADMIN — METRONIDAZOLE 500 MG: 500 TABLET ORAL at 14:31

## 2021-04-16 RX ADMIN — METRONIDAZOLE 500 MG: 500 TABLET ORAL at 21:49

## 2021-04-16 RX ADMIN — INSULIN LISPRO 5 UNITS: 100 INJECTION, SOLUTION INTRAVENOUS; SUBCUTANEOUS at 17:30

## 2021-04-16 RX ADMIN — INSULIN LISPRO 5 UNITS: 100 INJECTION, SOLUTION INTRAVENOUS; SUBCUTANEOUS at 08:34

## 2021-04-16 RX ADMIN — HEPARIN SODIUM 5000 UNITS: 5000 INJECTION, SOLUTION INTRAVENOUS; SUBCUTANEOUS at 14:31

## 2021-04-16 RX ADMIN — PREGABALIN 100 MG: 100 CAPSULE ORAL at 08:33

## 2021-04-16 RX ADMIN — METRONIDAZOLE 500 MG: 500 TABLET ORAL at 06:11

## 2021-04-16 RX ADMIN — HEPARIN SODIUM 5000 UNITS: 5000 INJECTION, SOLUTION INTRAVENOUS; SUBCUTANEOUS at 21:49

## 2021-04-16 RX ADMIN — SODIUM CHLORIDE 2 G: 900 INJECTION INTRAVENOUS at 08:33

## 2021-04-16 RX ADMIN — INSULIN DETEMIR 18 UNITS: 100 INJECTION, SOLUTION SUBCUTANEOUS at 21:49

## 2021-04-16 RX ADMIN — INSULIN LISPRO 5 UNITS: 100 INJECTION, SOLUTION INTRAVENOUS; SUBCUTANEOUS at 12:00

## 2021-04-16 RX ADMIN — SODIUM ZIRCONIUM CYCLOSILICATE 10 G: 10 POWDER, FOR SUSPENSION ORAL at 11:31

## 2021-04-17 LAB
ALBUMIN SERPL-MCNC: 2.5 G/DL (ref 3.5–5.2)
ANION GAP SERPL CALCULATED.3IONS-SCNC: 5 MMOL/L (ref 5–15)
BACTERIA SPEC AEROBE CULT: ABNORMAL
BUN SERPL-MCNC: 27 MG/DL (ref 8–23)
BUN/CREAT SERPL: 30.7 (ref 7–25)
CALCIUM SPEC-SCNC: 8.2 MG/DL (ref 8.6–10.5)
CHLORIDE SERPL-SCNC: 109 MMOL/L (ref 98–107)
CO2 SERPL-SCNC: 23 MMOL/L (ref 22–29)
CREAT SERPL-MCNC: 0.88 MG/DL (ref 0.57–1)
GFR SERPL CREATININE-BSD FRML MDRD: 62 ML/MIN/1.73
GLUCOSE BLDC GLUCOMTR-MCNC: 123 MG/DL (ref 70–130)
GLUCOSE BLDC GLUCOMTR-MCNC: 138 MG/DL (ref 70–130)
GLUCOSE BLDC GLUCOMTR-MCNC: 144 MG/DL (ref 70–130)
GLUCOSE BLDC GLUCOMTR-MCNC: 238 MG/DL (ref 70–130)
GLUCOSE BLDC GLUCOMTR-MCNC: 61 MG/DL (ref 70–130)
GLUCOSE SERPL-MCNC: 127 MG/DL (ref 65–99)
GRAM STN SPEC: ABNORMAL
GRAM STN SPEC: ABNORMAL
MAGNESIUM SERPL-MCNC: 1.5 MG/DL (ref 1.6–2.4)
PHOSPHATE SERPL-MCNC: 4.5 MG/DL (ref 2.5–4.5)
POTASSIUM SERPL-SCNC: 4.9 MMOL/L (ref 3.5–5.2)
SODIUM SERPL-SCNC: 137 MMOL/L (ref 136–145)

## 2021-04-17 PROCEDURE — 80069 RENAL FUNCTION PANEL: CPT | Performed by: INTERNAL MEDICINE

## 2021-04-17 PROCEDURE — 99232 SBSQ HOSP IP/OBS MODERATE 35: CPT | Performed by: INTERNAL MEDICINE

## 2021-04-17 PROCEDURE — 25010000002 CEFTRIAXONE PER 250 MG: Performed by: PHYSICIAN ASSISTANT

## 2021-04-17 PROCEDURE — 25010000003 MAGNESIUM SULFATE 4 GM/100ML SOLUTION: Performed by: INTERNAL MEDICINE

## 2021-04-17 PROCEDURE — 63710000001 INSULIN LISPRO (HUMAN) PER 5 UNITS: Performed by: INTERNAL MEDICINE

## 2021-04-17 PROCEDURE — 63710000001 INSULIN DETEMIR PER 5 UNITS: Performed by: INTERNAL MEDICINE

## 2021-04-17 PROCEDURE — 83735 ASSAY OF MAGNESIUM: CPT | Performed by: INTERNAL MEDICINE

## 2021-04-17 PROCEDURE — 25010000002 HEPARIN (PORCINE) PER 1000 UNITS: Performed by: PHYSICIAN ASSISTANT

## 2021-04-17 PROCEDURE — 82962 GLUCOSE BLOOD TEST: CPT

## 2021-04-17 PROCEDURE — 99024 POSTOP FOLLOW-UP VISIT: CPT | Performed by: THORACIC SURGERY (CARDIOTHORACIC VASCULAR SURGERY)

## 2021-04-17 RX ORDER — MAGNESIUM SULFATE HEPTAHYDRATE 40 MG/ML
4 INJECTION, SOLUTION INTRAVENOUS ONCE
Status: COMPLETED | OUTPATIENT
Start: 2021-04-17 | End: 2021-04-17

## 2021-04-17 RX ORDER — AMLODIPINE BESYLATE 5 MG/1
5 TABLET ORAL
Status: DISCONTINUED | OUTPATIENT
Start: 2021-04-17 | End: 2021-04-22 | Stop reason: HOSPADM

## 2021-04-17 RX ADMIN — ALLOPURINOL 300 MG: 300 TABLET ORAL at 09:59

## 2021-04-17 RX ADMIN — HEPARIN SODIUM 5000 UNITS: 5000 INJECTION, SOLUTION INTRAVENOUS; SUBCUTANEOUS at 15:35

## 2021-04-17 RX ADMIN — TRAMADOL HYDROCHLORIDE 50 MG: 50 TABLET, FILM COATED ORAL at 00:07

## 2021-04-17 RX ADMIN — HEPARIN SODIUM 5000 UNITS: 5000 INJECTION, SOLUTION INTRAVENOUS; SUBCUTANEOUS at 21:37

## 2021-04-17 RX ADMIN — METRONIDAZOLE 500 MG: 500 TABLET ORAL at 06:32

## 2021-04-17 RX ADMIN — TRAMADOL HYDROCHLORIDE 50 MG: 50 TABLET, FILM COATED ORAL at 09:59

## 2021-04-17 RX ADMIN — SODIUM CHLORIDE 2 G: 900 INJECTION INTRAVENOUS at 09:58

## 2021-04-17 RX ADMIN — METRONIDAZOLE 500 MG: 500 TABLET ORAL at 15:35

## 2021-04-17 RX ADMIN — INSULIN LISPRO 5 UNITS: 100 INJECTION, SOLUTION INTRAVENOUS; SUBCUTANEOUS at 11:52

## 2021-04-17 RX ADMIN — FLUTICASONE PROPIONATE 2 SPRAY: 50 SPRAY, METERED NASAL at 10:00

## 2021-04-17 RX ADMIN — PREGABALIN 200 MG: 100 CAPSULE ORAL at 21:36

## 2021-04-17 RX ADMIN — PREGABALIN 100 MG: 100 CAPSULE ORAL at 09:59

## 2021-04-17 RX ADMIN — MAGNESIUM SULFATE HEPTAHYDRATE 4 G: 40 INJECTION, SOLUTION INTRAVENOUS at 11:52

## 2021-04-17 RX ADMIN — TRIAMTERENE AND HYDROCHLOROTHIAZIDE 1 TABLET: 37.5; 25 TABLET ORAL at 09:59

## 2021-04-17 RX ADMIN — METRONIDAZOLE 500 MG: 500 TABLET ORAL at 21:37

## 2021-04-17 RX ADMIN — SODIUM CHLORIDE, PRESERVATIVE FREE 10 ML: 5 INJECTION INTRAVENOUS at 21:37

## 2021-04-17 RX ADMIN — INSULIN LISPRO 4 UNITS: 100 INJECTION, SOLUTION INTRAVENOUS; SUBCUTANEOUS at 16:57

## 2021-04-17 RX ADMIN — INSULIN DETEMIR 14 UNITS: 100 INJECTION, SOLUTION SUBCUTANEOUS at 21:37

## 2021-04-17 RX ADMIN — HEPARIN SODIUM 5000 UNITS: 5000 INJECTION, SOLUTION INTRAVENOUS; SUBCUTANEOUS at 06:31

## 2021-04-17 RX ADMIN — AMLODIPINE BESYLATE 5 MG: 5 TABLET ORAL at 15:35

## 2021-04-17 RX ADMIN — FLUCONAZOLE 400 MG: 200 TABLET ORAL at 16:57

## 2021-04-18 LAB
ANION GAP SERPL CALCULATED.3IONS-SCNC: 6 MMOL/L (ref 5–15)
BUN SERPL-MCNC: 23 MG/DL (ref 8–23)
BUN/CREAT SERPL: 26.4 (ref 7–25)
CALCIUM SPEC-SCNC: 8.3 MG/DL (ref 8.6–10.5)
CHLORIDE SERPL-SCNC: 102 MMOL/L (ref 98–107)
CO2 SERPL-SCNC: 25 MMOL/L (ref 22–29)
CREAT SERPL-MCNC: 0.87 MG/DL (ref 0.57–1)
DEPRECATED RDW RBC AUTO: 54.4 FL (ref 37–54)
ERYTHROCYTE [DISTWIDTH] IN BLOOD BY AUTOMATED COUNT: 14.9 % (ref 12.3–15.4)
GFR SERPL CREATININE-BSD FRML MDRD: 62 ML/MIN/1.73
GLUCOSE BLDC GLUCOMTR-MCNC: 165 MG/DL (ref 70–130)
GLUCOSE BLDC GLUCOMTR-MCNC: 185 MG/DL (ref 70–130)
GLUCOSE BLDC GLUCOMTR-MCNC: 214 MG/DL (ref 70–130)
GLUCOSE BLDC GLUCOMTR-MCNC: 235 MG/DL (ref 70–130)
GLUCOSE BLDC GLUCOMTR-MCNC: 266 MG/DL (ref 70–130)
GLUCOSE SERPL-MCNC: 253 MG/DL (ref 65–99)
HCT VFR BLD AUTO: 35.7 % (ref 34–46.6)
HGB BLD-MCNC: 10.9 G/DL (ref 12–15.9)
MAGNESIUM SERPL-MCNC: 2 MG/DL (ref 1.6–2.4)
MCH RBC QN AUTO: 30.9 PG (ref 26.6–33)
MCHC RBC AUTO-ENTMCNC: 30.5 G/DL (ref 31.5–35.7)
MCV RBC AUTO: 101.1 FL (ref 79–97)
PLATELET # BLD AUTO: 278 10*3/MM3 (ref 140–450)
PMV BLD AUTO: 11.2 FL (ref 6–12)
POTASSIUM SERPL-SCNC: 5.1 MMOL/L (ref 3.5–5.2)
RBC # BLD AUTO: 3.53 10*6/MM3 (ref 3.77–5.28)
SODIUM SERPL-SCNC: 133 MMOL/L (ref 136–145)
WBC # BLD AUTO: 6.1 10*3/MM3 (ref 3.4–10.8)

## 2021-04-18 PROCEDURE — 82962 GLUCOSE BLOOD TEST: CPT

## 2021-04-18 PROCEDURE — 63710000001 INSULIN LISPRO (HUMAN) PER 5 UNITS: Performed by: INTERNAL MEDICINE

## 2021-04-18 PROCEDURE — 97530 THERAPEUTIC ACTIVITIES: CPT

## 2021-04-18 PROCEDURE — 97110 THERAPEUTIC EXERCISES: CPT

## 2021-04-18 PROCEDURE — 99024 POSTOP FOLLOW-UP VISIT: CPT | Performed by: THORACIC SURGERY (CARDIOTHORACIC VASCULAR SURGERY)

## 2021-04-18 PROCEDURE — 25010000002 ONDANSETRON PER 1 MG: Performed by: PHYSICIAN ASSISTANT

## 2021-04-18 PROCEDURE — 25010000002 CEFTRIAXONE PER 250 MG: Performed by: PHYSICIAN ASSISTANT

## 2021-04-18 PROCEDURE — 80048 BASIC METABOLIC PNL TOTAL CA: CPT | Performed by: INTERNAL MEDICINE

## 2021-04-18 PROCEDURE — 97164 PT RE-EVAL EST PLAN CARE: CPT

## 2021-04-18 PROCEDURE — 83735 ASSAY OF MAGNESIUM: CPT | Performed by: INTERNAL MEDICINE

## 2021-04-18 PROCEDURE — 99233 SBSQ HOSP IP/OBS HIGH 50: CPT | Performed by: INTERNAL MEDICINE

## 2021-04-18 PROCEDURE — 85027 COMPLETE CBC AUTOMATED: CPT | Performed by: INTERNAL MEDICINE

## 2021-04-18 PROCEDURE — 97166 OT EVAL MOD COMPLEX 45 MIN: CPT

## 2021-04-18 PROCEDURE — 63710000001 INSULIN DETEMIR PER 5 UNITS: Performed by: INTERNAL MEDICINE

## 2021-04-18 PROCEDURE — 25010000002 HEPARIN (PORCINE) PER 1000 UNITS: Performed by: PHYSICIAN ASSISTANT

## 2021-04-18 RX ORDER — MAGNESIUM SULFATE HEPTAHYDRATE 40 MG/ML
2 INJECTION, SOLUTION INTRAVENOUS AS NEEDED
Status: DISCONTINUED | OUTPATIENT
Start: 2021-04-18 | End: 2021-04-22 | Stop reason: HOSPADM

## 2021-04-18 RX ORDER — DICYCLOMINE HCL 20 MG
10 TABLET ORAL 4 TIMES DAILY PRN
Status: DISCONTINUED | OUTPATIENT
Start: 2021-04-18 | End: 2021-04-22 | Stop reason: HOSPADM

## 2021-04-18 RX ORDER — SODIUM CHLORIDE 9 MG/ML
50 INJECTION, SOLUTION INTRAVENOUS CONTINUOUS
Status: ACTIVE | OUTPATIENT
Start: 2021-04-18 | End: 2021-04-19

## 2021-04-18 RX ORDER — SACCHAROMYCES BOULARDII 250 MG
250 CAPSULE ORAL 2 TIMES DAILY
Status: DISCONTINUED | OUTPATIENT
Start: 2021-04-18 | End: 2021-04-22 | Stop reason: HOSPADM

## 2021-04-18 RX ORDER — MAGNESIUM SULFATE 1 G/100ML
1 INJECTION INTRAVENOUS AS NEEDED
Status: DISCONTINUED | OUTPATIENT
Start: 2021-04-18 | End: 2021-04-22 | Stop reason: HOSPADM

## 2021-04-18 RX ADMIN — SODIUM CHLORIDE, PRESERVATIVE FREE 10 ML: 5 INJECTION INTRAVENOUS at 22:11

## 2021-04-18 RX ADMIN — INSULIN DETEMIR 14 UNITS: 100 INJECTION, SOLUTION SUBCUTANEOUS at 22:18

## 2021-04-18 RX ADMIN — PREGABALIN 100 MG: 100 CAPSULE ORAL at 09:54

## 2021-04-18 RX ADMIN — HEPARIN SODIUM 5000 UNITS: 5000 INJECTION, SOLUTION INTRAVENOUS; SUBCUTANEOUS at 13:16

## 2021-04-18 RX ADMIN — HYDROCODONE BITARTATE AND ACETAMINOPHEN 1 TABLET: 5; 325 TABLET ORAL at 23:08

## 2021-04-18 RX ADMIN — INSULIN LISPRO 4 UNITS: 100 INJECTION, SOLUTION INTRAVENOUS; SUBCUTANEOUS at 09:55

## 2021-04-18 RX ADMIN — TRIAMTERENE AND HYDROCHLOROTHIAZIDE 1 TABLET: 37.5; 25 TABLET ORAL at 09:54

## 2021-04-18 RX ADMIN — SODIUM CHLORIDE 50 ML/HR: 9 INJECTION, SOLUTION INTRAVENOUS at 17:41

## 2021-04-18 RX ADMIN — Medication 250 MG: at 22:11

## 2021-04-18 RX ADMIN — INSULIN LISPRO 5 UNITS: 100 INJECTION, SOLUTION INTRAVENOUS; SUBCUTANEOUS at 17:40

## 2021-04-18 RX ADMIN — DICYCLOMINE HYDROCHLORIDE 10 MG: 20 TABLET ORAL at 16:28

## 2021-04-18 RX ADMIN — SODIUM CHLORIDE 2 G: 900 INJECTION INTRAVENOUS at 10:00

## 2021-04-18 RX ADMIN — FLUCONAZOLE 400 MG: 200 TABLET ORAL at 17:44

## 2021-04-18 RX ADMIN — HEPARIN SODIUM 5000 UNITS: 5000 INJECTION, SOLUTION INTRAVENOUS; SUBCUTANEOUS at 22:10

## 2021-04-18 RX ADMIN — Medication 250 MG: at 16:28

## 2021-04-18 RX ADMIN — METRONIDAZOLE 500 MG: 500 TABLET ORAL at 22:11

## 2021-04-18 RX ADMIN — SODIUM CHLORIDE, PRESERVATIVE FREE 10 ML: 5 INJECTION INTRAVENOUS at 09:55

## 2021-04-18 RX ADMIN — INSULIN LISPRO 4 UNITS: 100 INJECTION, SOLUTION INTRAVENOUS; SUBCUTANEOUS at 13:16

## 2021-04-18 RX ADMIN — HEPARIN SODIUM 5000 UNITS: 5000 INJECTION, SOLUTION INTRAVENOUS; SUBCUTANEOUS at 06:22

## 2021-04-18 RX ADMIN — ONDANSETRON 4 MG: 2 INJECTION INTRAMUSCULAR; INTRAVENOUS at 16:29

## 2021-04-18 RX ADMIN — PREGABALIN 200 MG: 100 CAPSULE ORAL at 22:11

## 2021-04-18 RX ADMIN — METRONIDAZOLE 500 MG: 500 TABLET ORAL at 13:16

## 2021-04-18 RX ADMIN — FLUTICASONE PROPIONATE 2 SPRAY: 50 SPRAY, METERED NASAL at 09:56

## 2021-04-18 RX ADMIN — AMLODIPINE BESYLATE 5 MG: 5 TABLET ORAL at 09:54

## 2021-04-18 RX ADMIN — ALLOPURINOL 300 MG: 300 TABLET ORAL at 09:55

## 2021-04-18 RX ADMIN — INSULIN LISPRO 8 UNITS: 100 INJECTION, SOLUTION INTRAVENOUS; SUBCUTANEOUS at 17:40

## 2021-04-18 RX ADMIN — METRONIDAZOLE 500 MG: 500 TABLET ORAL at 06:21

## 2021-04-18 RX ADMIN — INSULIN LISPRO 12 UNITS: 100 INJECTION, SOLUTION INTRAVENOUS; SUBCUTANEOUS at 13:17

## 2021-04-19 LAB
ANION GAP SERPL CALCULATED.3IONS-SCNC: 3 MMOL/L (ref 5–15)
BACTERIA SPEC ANAEROBE CULT: NORMAL
BUN SERPL-MCNC: 26 MG/DL (ref 8–23)
BUN/CREAT SERPL: 26.3 (ref 7–25)
CALCIUM SPEC-SCNC: 8.7 MG/DL (ref 8.6–10.5)
CHLORIDE SERPL-SCNC: 104 MMOL/L (ref 98–107)
CO2 SERPL-SCNC: 27 MMOL/L (ref 22–29)
CREAT SERPL-MCNC: 0.99 MG/DL (ref 0.57–1)
CYTO UR: NORMAL
DEPRECATED RDW RBC AUTO: 54.4 FL (ref 37–54)
ERYTHROCYTE [DISTWIDTH] IN BLOOD BY AUTOMATED COUNT: 14.8 % (ref 12.3–15.4)
GFR SERPL CREATININE-BSD FRML MDRD: 54 ML/MIN/1.73
GLUCOSE BLDC GLUCOMTR-MCNC: 174 MG/DL (ref 70–130)
GLUCOSE BLDC GLUCOMTR-MCNC: 232 MG/DL (ref 70–130)
GLUCOSE BLDC GLUCOMTR-MCNC: 233 MG/DL (ref 70–130)
GLUCOSE BLDC GLUCOMTR-MCNC: 272 MG/DL (ref 70–130)
GLUCOSE SERPL-MCNC: 145 MG/DL (ref 65–99)
HCT VFR BLD AUTO: 38.4 % (ref 34–46.6)
HGB BLD-MCNC: 11.7 G/DL (ref 12–15.9)
LAB AP CASE REPORT: NORMAL
LAB AP CLINICAL INFORMATION: NORMAL
MAGNESIUM SERPL-MCNC: 1.7 MG/DL (ref 1.6–2.4)
MCH RBC QN AUTO: 31 PG (ref 26.6–33)
MCHC RBC AUTO-ENTMCNC: 30.5 G/DL (ref 31.5–35.7)
MCV RBC AUTO: 101.6 FL (ref 79–97)
PATH REPORT.FINAL DX SPEC: NORMAL
PATH REPORT.GROSS SPEC: NORMAL
PLATELET # BLD AUTO: 301 10*3/MM3 (ref 140–450)
PMV BLD AUTO: 10.9 FL (ref 6–12)
POTASSIUM SERPL-SCNC: 5.2 MMOL/L (ref 3.5–5.2)
RBC # BLD AUTO: 3.78 10*6/MM3 (ref 3.77–5.28)
SODIUM SERPL-SCNC: 134 MMOL/L (ref 136–145)
WBC # BLD AUTO: 6.26 10*3/MM3 (ref 3.4–10.8)

## 2021-04-19 PROCEDURE — 85027 COMPLETE CBC AUTOMATED: CPT | Performed by: INTERNAL MEDICINE

## 2021-04-19 PROCEDURE — 97530 THERAPEUTIC ACTIVITIES: CPT

## 2021-04-19 PROCEDURE — 82962 GLUCOSE BLOOD TEST: CPT

## 2021-04-19 PROCEDURE — 80048 BASIC METABOLIC PNL TOTAL CA: CPT | Performed by: INTERNAL MEDICINE

## 2021-04-19 PROCEDURE — 99232 SBSQ HOSP IP/OBS MODERATE 35: CPT | Performed by: NURSE PRACTITIONER

## 2021-04-19 PROCEDURE — 63710000001 INSULIN LISPRO (HUMAN) PER 5 UNITS: Performed by: INTERNAL MEDICINE

## 2021-04-19 PROCEDURE — 25010000002 CEFTRIAXONE PER 250 MG: Performed by: PHYSICIAN ASSISTANT

## 2021-04-19 PROCEDURE — 25010000002 HEPARIN (PORCINE) PER 1000 UNITS: Performed by: PHYSICIAN ASSISTANT

## 2021-04-19 PROCEDURE — 97110 THERAPEUTIC EXERCISES: CPT

## 2021-04-19 PROCEDURE — 83735 ASSAY OF MAGNESIUM: CPT | Performed by: INTERNAL MEDICINE

## 2021-04-19 PROCEDURE — 99024 POSTOP FOLLOW-UP VISIT: CPT | Performed by: THORACIC SURGERY (CARDIOTHORACIC VASCULAR SURGERY)

## 2021-04-19 PROCEDURE — 63710000001 INSULIN DETEMIR PER 5 UNITS: Performed by: INTERNAL MEDICINE

## 2021-04-19 RX ORDER — LIDOCAINE 50 MG/G
1 PATCH TOPICAL
Status: DISCONTINUED | OUTPATIENT
Start: 2021-04-19 | End: 2021-04-22 | Stop reason: HOSPADM

## 2021-04-19 RX ADMIN — METRONIDAZOLE 500 MG: 500 TABLET ORAL at 14:43

## 2021-04-19 RX ADMIN — AMLODIPINE BESYLATE 5 MG: 5 TABLET ORAL at 08:11

## 2021-04-19 RX ADMIN — INSULIN LISPRO 5 UNITS: 100 INJECTION, SOLUTION INTRAVENOUS; SUBCUTANEOUS at 17:34

## 2021-04-19 RX ADMIN — ALLOPURINOL 300 MG: 300 TABLET ORAL at 08:11

## 2021-04-19 RX ADMIN — INSULIN LISPRO 8 UNITS: 100 INJECTION, SOLUTION INTRAVENOUS; SUBCUTANEOUS at 12:32

## 2021-04-19 RX ADMIN — SODIUM CHLORIDE, PRESERVATIVE FREE 10 ML: 5 INJECTION INTRAVENOUS at 22:48

## 2021-04-19 RX ADMIN — PREGABALIN 200 MG: 100 CAPSULE ORAL at 22:44

## 2021-04-19 RX ADMIN — HEPARIN SODIUM 5000 UNITS: 5000 INJECTION, SOLUTION INTRAVENOUS; SUBCUTANEOUS at 06:57

## 2021-04-19 RX ADMIN — HEPARIN SODIUM 5000 UNITS: 5000 INJECTION, SOLUTION INTRAVENOUS; SUBCUTANEOUS at 14:42

## 2021-04-19 RX ADMIN — LIDOCAINE 1 PATCH: 50 PATCH CUTANEOUS at 12:32

## 2021-04-19 RX ADMIN — PREGABALIN 100 MG: 100 CAPSULE ORAL at 08:11

## 2021-04-19 RX ADMIN — SODIUM CHLORIDE 2 G: 900 INJECTION INTRAVENOUS at 12:30

## 2021-04-19 RX ADMIN — INSULIN LISPRO 5 UNITS: 100 INJECTION, SOLUTION INTRAVENOUS; SUBCUTANEOUS at 08:11

## 2021-04-19 RX ADMIN — METRONIDAZOLE 500 MG: 500 TABLET ORAL at 22:44

## 2021-04-19 RX ADMIN — HEPARIN SODIUM 5000 UNITS: 5000 INJECTION, SOLUTION INTRAVENOUS; SUBCUTANEOUS at 22:44

## 2021-04-19 RX ADMIN — FLUTICASONE PROPIONATE 2 SPRAY: 50 SPRAY, METERED NASAL at 08:12

## 2021-04-19 RX ADMIN — METRONIDAZOLE 500 MG: 500 TABLET ORAL at 06:57

## 2021-04-19 RX ADMIN — Medication 250 MG: at 08:11

## 2021-04-19 RX ADMIN — Medication 250 MG: at 22:44

## 2021-04-19 RX ADMIN — INSULIN LISPRO 8 UNITS: 100 INJECTION, SOLUTION INTRAVENOUS; SUBCUTANEOUS at 17:34

## 2021-04-19 RX ADMIN — INSULIN DETEMIR 14 UNITS: 100 INJECTION, SOLUTION SUBCUTANEOUS at 22:44

## 2021-04-19 RX ADMIN — FLUCONAZOLE 400 MG: 200 TABLET ORAL at 17:33

## 2021-04-19 RX ADMIN — INSULIN LISPRO 5 UNITS: 100 INJECTION, SOLUTION INTRAVENOUS; SUBCUTANEOUS at 12:32

## 2021-04-19 RX ADMIN — INSULIN LISPRO 4 UNITS: 100 INJECTION, SOLUTION INTRAVENOUS; SUBCUTANEOUS at 08:12

## 2021-04-20 LAB
GLUCOSE BLDC GLUCOMTR-MCNC: 151 MG/DL (ref 70–130)
GLUCOSE BLDC GLUCOMTR-MCNC: 174 MG/DL (ref 70–130)
GLUCOSE BLDC GLUCOMTR-MCNC: 200 MG/DL (ref 70–130)
GLUCOSE BLDC GLUCOMTR-MCNC: 239 MG/DL (ref 70–130)
POTASSIUM SERPL-SCNC: 5.4 MMOL/L (ref 3.5–5.2)
SARS-COV-2 RNA RESP QL NAA+PROBE: NOT DETECTED

## 2021-04-20 PROCEDURE — 25010000002 CEFTRIAXONE PER 250 MG: Performed by: PHYSICIAN ASSISTANT

## 2021-04-20 PROCEDURE — 97530 THERAPEUTIC ACTIVITIES: CPT

## 2021-04-20 PROCEDURE — 99024 POSTOP FOLLOW-UP VISIT: CPT | Performed by: THORACIC SURGERY (CARDIOTHORACIC VASCULAR SURGERY)

## 2021-04-20 PROCEDURE — 25010000002 HEPARIN (PORCINE) PER 1000 UNITS: Performed by: PHYSICIAN ASSISTANT

## 2021-04-20 PROCEDURE — U0003 INFECTIOUS AGENT DETECTION BY NUCLEIC ACID (DNA OR RNA); SEVERE ACUTE RESPIRATORY SYNDROME CORONAVIRUS 2 (SARS-COV-2) (CORONAVIRUS DISEASE [COVID-19]), AMPLIFIED PROBE TECHNIQUE, MAKING USE OF HIGH THROUGHPUT TECHNOLOGIES AS DESCRIBED BY CMS-2020-01-R: HCPCS | Performed by: HOSPITALIST

## 2021-04-20 PROCEDURE — 99233 SBSQ HOSP IP/OBS HIGH 50: CPT | Performed by: NURSE PRACTITIONER

## 2021-04-20 PROCEDURE — 63710000001 INSULIN LISPRO (HUMAN) PER 5 UNITS: Performed by: INTERNAL MEDICINE

## 2021-04-20 PROCEDURE — 82962 GLUCOSE BLOOD TEST: CPT

## 2021-04-20 PROCEDURE — 84132 ASSAY OF SERUM POTASSIUM: CPT | Performed by: NURSE PRACTITIONER

## 2021-04-20 PROCEDURE — 63710000001 INSULIN DETEMIR PER 5 UNITS: Performed by: INTERNAL MEDICINE

## 2021-04-20 PROCEDURE — 97110 THERAPEUTIC EXERCISES: CPT

## 2021-04-20 RX ORDER — AMOXICILLIN AND CLAVULANATE POTASSIUM 500; 125 MG/1; MG/1
1 TABLET, FILM COATED ORAL EVERY 12 HOURS SCHEDULED
Status: DISCONTINUED | OUTPATIENT
Start: 2021-04-21 | End: 2021-04-22 | Stop reason: HOSPADM

## 2021-04-20 RX ADMIN — FLUTICASONE PROPIONATE 2 SPRAY: 50 SPRAY, METERED NASAL at 08:34

## 2021-04-20 RX ADMIN — ALLOPURINOL 300 MG: 300 TABLET ORAL at 08:34

## 2021-04-20 RX ADMIN — METRONIDAZOLE 500 MG: 500 TABLET ORAL at 06:28

## 2021-04-20 RX ADMIN — METRONIDAZOLE 500 MG: 500 TABLET ORAL at 13:20

## 2021-04-20 RX ADMIN — INSULIN LISPRO 5 UNITS: 100 INJECTION, SOLUTION INTRAVENOUS; SUBCUTANEOUS at 13:20

## 2021-04-20 RX ADMIN — INSULIN LISPRO 8 UNITS: 100 INJECTION, SOLUTION INTRAVENOUS; SUBCUTANEOUS at 17:42

## 2021-04-20 RX ADMIN — INSULIN DETEMIR 14 UNITS: 100 INJECTION, SOLUTION SUBCUTANEOUS at 22:26

## 2021-04-20 RX ADMIN — INSULIN LISPRO 4 UNITS: 100 INJECTION, SOLUTION INTRAVENOUS; SUBCUTANEOUS at 08:35

## 2021-04-20 RX ADMIN — INSULIN LISPRO 5 UNITS: 100 INJECTION, SOLUTION INTRAVENOUS; SUBCUTANEOUS at 17:42

## 2021-04-20 RX ADMIN — HEPARIN SODIUM 5000 UNITS: 5000 INJECTION, SOLUTION INTRAVENOUS; SUBCUTANEOUS at 06:28

## 2021-04-20 RX ADMIN — SODIUM CHLORIDE, PRESERVATIVE FREE 10 ML: 5 INJECTION INTRAVENOUS at 08:35

## 2021-04-20 RX ADMIN — AMLODIPINE BESYLATE 5 MG: 5 TABLET ORAL at 08:34

## 2021-04-20 RX ADMIN — PREGABALIN 100 MG: 100 CAPSULE ORAL at 08:34

## 2021-04-20 RX ADMIN — INSULIN LISPRO 4 UNITS: 100 INJECTION, SOLUTION INTRAVENOUS; SUBCUTANEOUS at 13:20

## 2021-04-20 RX ADMIN — INSULIN LISPRO 5 UNITS: 100 INJECTION, SOLUTION INTRAVENOUS; SUBCUTANEOUS at 08:34

## 2021-04-20 RX ADMIN — LIDOCAINE 1 PATCH: 50 PATCH CUTANEOUS at 08:35

## 2021-04-20 RX ADMIN — SODIUM CHLORIDE, PRESERVATIVE FREE 10 ML: 5 INJECTION INTRAVENOUS at 22:27

## 2021-04-20 RX ADMIN — PREGABALIN 200 MG: 100 CAPSULE ORAL at 22:26

## 2021-04-20 RX ADMIN — Medication 250 MG: at 22:26

## 2021-04-20 RX ADMIN — HEPARIN SODIUM 5000 UNITS: 5000 INJECTION, SOLUTION INTRAVENOUS; SUBCUTANEOUS at 13:20

## 2021-04-20 RX ADMIN — Medication 250 MG: at 08:37

## 2021-04-20 RX ADMIN — FLUCONAZOLE 400 MG: 200 TABLET ORAL at 17:42

## 2021-04-20 RX ADMIN — SODIUM CHLORIDE 2 G: 900 INJECTION INTRAVENOUS at 08:34

## 2021-04-20 RX ADMIN — HEPARIN SODIUM 5000 UNITS: 5000 INJECTION, SOLUTION INTRAVENOUS; SUBCUTANEOUS at 22:26

## 2021-04-21 ENCOUNTER — APPOINTMENT (OUTPATIENT)
Dept: GENERAL RADIOLOGY | Facility: HOSPITAL | Age: 81
End: 2021-04-21

## 2021-04-21 LAB
ANION GAP SERPL CALCULATED.3IONS-SCNC: 8 MMOL/L (ref 5–15)
BUN SERPL-MCNC: 26 MG/DL (ref 8–23)
BUN/CREAT SERPL: 28 (ref 7–25)
CALCIUM SPEC-SCNC: 8.4 MG/DL (ref 8.6–10.5)
CHLORIDE SERPL-SCNC: 101 MMOL/L (ref 98–107)
CO2 SERPL-SCNC: 22 MMOL/L (ref 22–29)
CREAT SERPL-MCNC: 0.93 MG/DL (ref 0.57–1)
DEPRECATED RDW RBC AUTO: 53 FL (ref 37–54)
ERYTHROCYTE [DISTWIDTH] IN BLOOD BY AUTOMATED COUNT: 14.8 % (ref 12.3–15.4)
GFR SERPL CREATININE-BSD FRML MDRD: 58 ML/MIN/1.73
GLUCOSE BLDC GLUCOMTR-MCNC: 153 MG/DL (ref 70–130)
GLUCOSE BLDC GLUCOMTR-MCNC: 201 MG/DL (ref 70–130)
GLUCOSE BLDC GLUCOMTR-MCNC: 266 MG/DL (ref 70–130)
GLUCOSE BLDC GLUCOMTR-MCNC: 271 MG/DL (ref 70–130)
GLUCOSE SERPL-MCNC: 257 MG/DL (ref 65–99)
HCT VFR BLD AUTO: 33.4 % (ref 34–46.6)
HGB BLD-MCNC: 10.4 G/DL (ref 12–15.9)
MAGNESIUM SERPL-MCNC: 1.5 MG/DL (ref 1.6–2.4)
MCH RBC QN AUTO: 30.9 PG (ref 26.6–33)
MCHC RBC AUTO-ENTMCNC: 31.1 G/DL (ref 31.5–35.7)
MCV RBC AUTO: 99.1 FL (ref 79–97)
PLATELET # BLD AUTO: 261 10*3/MM3 (ref 140–450)
PMV BLD AUTO: 11.2 FL (ref 6–12)
POTASSIUM SERPL-SCNC: 5.4 MMOL/L (ref 3.5–5.2)
RBC # BLD AUTO: 3.37 10*6/MM3 (ref 3.77–5.28)
SODIUM SERPL-SCNC: 131 MMOL/L (ref 136–145)
WBC # BLD AUTO: 6.08 10*3/MM3 (ref 3.4–10.8)

## 2021-04-21 PROCEDURE — 63710000001 INSULIN DETEMIR PER 5 UNITS: Performed by: INTERNAL MEDICINE

## 2021-04-21 PROCEDURE — 80048 BASIC METABOLIC PNL TOTAL CA: CPT | Performed by: NURSE PRACTITIONER

## 2021-04-21 PROCEDURE — 85027 COMPLETE CBC AUTOMATED: CPT | Performed by: NURSE PRACTITIONER

## 2021-04-21 PROCEDURE — 25010000002 ONDANSETRON PER 1 MG: Performed by: PHYSICIAN ASSISTANT

## 2021-04-21 PROCEDURE — 25010000002 HEPARIN (PORCINE) PER 1000 UNITS: Performed by: PHYSICIAN ASSISTANT

## 2021-04-21 PROCEDURE — 99233 SBSQ HOSP IP/OBS HIGH 50: CPT | Performed by: INTERNAL MEDICINE

## 2021-04-21 PROCEDURE — 74018 RADEX ABDOMEN 1 VIEW: CPT

## 2021-04-21 PROCEDURE — 99024 POSTOP FOLLOW-UP VISIT: CPT | Performed by: THORACIC SURGERY (CARDIOTHORACIC VASCULAR SURGERY)

## 2021-04-21 PROCEDURE — 25010000002 PROCHLORPERAZINE 10 MG/2ML SOLUTION: Performed by: INTERNAL MEDICINE

## 2021-04-21 PROCEDURE — 82962 GLUCOSE BLOOD TEST: CPT

## 2021-04-21 PROCEDURE — 25010000002 MAGNESIUM SULFATE 2 GM/50ML SOLUTION: Performed by: INTERNAL MEDICINE

## 2021-04-21 PROCEDURE — 97530 THERAPEUTIC ACTIVITIES: CPT

## 2021-04-21 PROCEDURE — 83735 ASSAY OF MAGNESIUM: CPT | Performed by: NURSE PRACTITIONER

## 2021-04-21 PROCEDURE — 97110 THERAPEUTIC EXERCISES: CPT

## 2021-04-21 PROCEDURE — 63710000001 INSULIN LISPRO (HUMAN) PER 5 UNITS: Performed by: INTERNAL MEDICINE

## 2021-04-21 RX ORDER — PROCHLORPERAZINE EDISYLATE 5 MG/ML
5 INJECTION INTRAMUSCULAR; INTRAVENOUS EVERY 6 HOURS PRN
Status: DISCONTINUED | OUTPATIENT
Start: 2021-04-21 | End: 2021-04-22 | Stop reason: HOSPADM

## 2021-04-21 RX ORDER — PROCHLORPERAZINE MALEATE 5 MG/1
5 TABLET ORAL EVERY 6 HOURS PRN
Status: CANCELLED | OUTPATIENT
Start: 2021-04-21

## 2021-04-21 RX ORDER — PROCHLORPERAZINE 25 MG
25 SUPPOSITORY, RECTAL RECTAL EVERY 12 HOURS PRN
Status: CANCELLED | OUTPATIENT
Start: 2021-04-21

## 2021-04-21 RX ORDER — PROCHLORPERAZINE EDISYLATE 5 MG/ML
5 INJECTION INTRAMUSCULAR; INTRAVENOUS EVERY 6 HOURS PRN
Status: CANCELLED | OUTPATIENT
Start: 2021-04-21

## 2021-04-21 RX ORDER — PROCHLORPERAZINE EDISYLATE 5 MG/ML
10 INJECTION INTRAMUSCULAR; INTRAVENOUS EVERY 6 HOURS PRN
Status: DISCONTINUED | OUTPATIENT
Start: 2021-04-21 | End: 2021-04-21

## 2021-04-21 RX ADMIN — ALLOPURINOL 300 MG: 300 TABLET ORAL at 09:04

## 2021-04-21 RX ADMIN — HEPARIN SODIUM 5000 UNITS: 5000 INJECTION, SOLUTION INTRAVENOUS; SUBCUTANEOUS at 21:24

## 2021-04-21 RX ADMIN — HEPARIN SODIUM 5000 UNITS: 5000 INJECTION, SOLUTION INTRAVENOUS; SUBCUTANEOUS at 14:15

## 2021-04-21 RX ADMIN — INSULIN LISPRO 5 UNITS: 100 INJECTION, SOLUTION INTRAVENOUS; SUBCUTANEOUS at 17:38

## 2021-04-21 RX ADMIN — PROCHLORPERAZINE EDISYLATE 5 MG: 5 INJECTION INTRAMUSCULAR; INTRAVENOUS at 16:30

## 2021-04-21 RX ADMIN — HYDROCODONE BITARTATE AND ACETAMINOPHEN 1 TABLET: 5; 325 TABLET ORAL at 05:35

## 2021-04-21 RX ADMIN — LIDOCAINE 1 PATCH: 50 PATCH CUTANEOUS at 09:04

## 2021-04-21 RX ADMIN — AMOXICILLIN AND CLAVULANATE POTASSIUM 500 MG: 500; 125 TABLET, FILM COATED ORAL at 21:24

## 2021-04-21 RX ADMIN — INSULIN LISPRO 12 UNITS: 100 INJECTION, SOLUTION INTRAVENOUS; SUBCUTANEOUS at 12:55

## 2021-04-21 RX ADMIN — PREGABALIN 200 MG: 100 CAPSULE ORAL at 21:24

## 2021-04-21 RX ADMIN — ONDANSETRON 4 MG: 2 INJECTION INTRAMUSCULAR; INTRAVENOUS at 12:59

## 2021-04-21 RX ADMIN — SODIUM CHLORIDE, PRESERVATIVE FREE 10 ML: 5 INJECTION INTRAVENOUS at 21:25

## 2021-04-21 RX ADMIN — AMLODIPINE BESYLATE 5 MG: 5 TABLET ORAL at 09:05

## 2021-04-21 RX ADMIN — INSULIN LISPRO 8 UNITS: 100 INJECTION, SOLUTION INTRAVENOUS; SUBCUTANEOUS at 09:05

## 2021-04-21 RX ADMIN — INSULIN DETEMIR 15 UNITS: 100 INJECTION, SOLUTION SUBCUTANEOUS at 21:24

## 2021-04-21 RX ADMIN — AMOXICILLIN AND CLAVULANATE POTASSIUM 500 MG: 500; 125 TABLET, FILM COATED ORAL at 11:23

## 2021-04-21 RX ADMIN — SODIUM CHLORIDE, PRESERVATIVE FREE 10 ML: 5 INJECTION INTRAVENOUS at 09:03

## 2021-04-21 RX ADMIN — Medication 250 MG: at 21:24

## 2021-04-21 RX ADMIN — FLUCONAZOLE 400 MG: 200 TABLET ORAL at 17:39

## 2021-04-21 RX ADMIN — MAGNESIUM HYDROXIDE 10 ML: 2400 SUSPENSION ORAL at 15:11

## 2021-04-21 RX ADMIN — INSULIN LISPRO 4 UNITS: 100 INJECTION, SOLUTION INTRAVENOUS; SUBCUTANEOUS at 17:39

## 2021-04-21 RX ADMIN — PREGABALIN 100 MG: 100 CAPSULE ORAL at 09:05

## 2021-04-21 RX ADMIN — FLUTICASONE PROPIONATE 2 SPRAY: 50 SPRAY, METERED NASAL at 09:03

## 2021-04-21 RX ADMIN — INSULIN LISPRO 5 UNITS: 100 INJECTION, SOLUTION INTRAVENOUS; SUBCUTANEOUS at 09:04

## 2021-04-21 RX ADMIN — MAGNESIUM SULFATE HEPTAHYDRATE 2 G: 2 INJECTION, SOLUTION INTRAVENOUS at 06:50

## 2021-04-21 RX ADMIN — SODIUM ZIRCONIUM CYCLOSILICATE 10 G: 10 POWDER, FOR SUSPENSION ORAL at 12:55

## 2021-04-21 RX ADMIN — HEPARIN SODIUM 5000 UNITS: 5000 INJECTION, SOLUTION INTRAVENOUS; SUBCUTANEOUS at 05:35

## 2021-04-21 RX ADMIN — INSULIN LISPRO 5 UNITS: 100 INJECTION, SOLUTION INTRAVENOUS; SUBCUTANEOUS at 12:55

## 2021-04-22 ENCOUNTER — READMISSION MANAGEMENT (OUTPATIENT)
Dept: CALL CENTER | Facility: HOSPITAL | Age: 81
End: 2021-04-22

## 2021-04-22 VITALS
WEIGHT: 181.4 LBS | DIASTOLIC BLOOD PRESSURE: 65 MMHG | RESPIRATION RATE: 16 BRPM | BODY MASS INDEX: 30.97 KG/M2 | HEIGHT: 64 IN | TEMPERATURE: 98.3 F | SYSTOLIC BLOOD PRESSURE: 152 MMHG | HEART RATE: 72 BPM | OXYGEN SATURATION: 94 %

## 2021-04-22 PROBLEM — E87.5 HYPERKALEMIA: Status: RESOLVED | Noted: 2021-04-15 | Resolved: 2021-04-22

## 2021-04-22 LAB
ANION GAP SERPL CALCULATED.3IONS-SCNC: 6 MMOL/L (ref 5–15)
BUN SERPL-MCNC: 25 MG/DL (ref 8–23)
BUN/CREAT SERPL: 25.3 (ref 7–25)
CALCIUM SPEC-SCNC: 7.9 MG/DL (ref 8.6–10.5)
CHLORIDE SERPL-SCNC: 101 MMOL/L (ref 98–107)
CO2 SERPL-SCNC: 24 MMOL/L (ref 22–29)
CREAT SERPL-MCNC: 0.99 MG/DL (ref 0.57–1)
GFR SERPL CREATININE-BSD FRML MDRD: 54 ML/MIN/1.73
GLUCOSE BLDC GLUCOMTR-MCNC: 195 MG/DL (ref 70–130)
GLUCOSE SERPL-MCNC: 178 MG/DL (ref 65–99)
MAGNESIUM SERPL-MCNC: 2.1 MG/DL (ref 1.6–2.4)
POTASSIUM SERPL-SCNC: 5.2 MMOL/L (ref 3.5–5.2)
SODIUM SERPL-SCNC: 131 MMOL/L (ref 136–145)

## 2021-04-22 PROCEDURE — 83735 ASSAY OF MAGNESIUM: CPT | Performed by: INTERNAL MEDICINE

## 2021-04-22 PROCEDURE — 99239 HOSP IP/OBS DSCHRG MGMT >30: CPT | Performed by: NURSE PRACTITIONER

## 2021-04-22 PROCEDURE — 80048 BASIC METABOLIC PNL TOTAL CA: CPT | Performed by: INTERNAL MEDICINE

## 2021-04-22 PROCEDURE — 82962 GLUCOSE BLOOD TEST: CPT

## 2021-04-22 PROCEDURE — 63710000001 INSULIN LISPRO (HUMAN) PER 5 UNITS: Performed by: INTERNAL MEDICINE

## 2021-04-22 PROCEDURE — 99024 POSTOP FOLLOW-UP VISIT: CPT | Performed by: THORACIC SURGERY (CARDIOTHORACIC VASCULAR SURGERY)

## 2021-04-22 PROCEDURE — 25010000002 HEPARIN (PORCINE) PER 1000 UNITS: Performed by: PHYSICIAN ASSISTANT

## 2021-04-22 RX ORDER — DICYCLOMINE HCL 20 MG
10 TABLET ORAL 4 TIMES DAILY PRN
Start: 2021-04-22 | End: 2021-08-19

## 2021-04-22 RX ORDER — SACCHAROMYCES BOULARDII 250 MG
250 CAPSULE ORAL 2 TIMES DAILY
Start: 2021-04-22 | End: 2021-08-19

## 2021-04-22 RX ORDER — FLUTICASONE PROPIONATE 50 MCG
2 SPRAY, SUSPENSION (ML) NASAL DAILY
Start: 2021-04-22 | End: 2022-04-21

## 2021-04-22 RX ORDER — LIDOCAINE 50 MG/G
1 PATCH TOPICAL
Start: 2021-04-22 | End: 2021-08-19

## 2021-04-22 RX ORDER — TRAMADOL HYDROCHLORIDE 50 MG/1
50 TABLET ORAL EVERY 8 HOURS PRN
Qty: 10 TABLET | Refills: 0 | Status: SHIPPED | OUTPATIENT
Start: 2021-04-22 | End: 2021-06-26 | Stop reason: HOSPADM

## 2021-04-22 RX ORDER — AMOXICILLIN AND CLAVULANATE POTASSIUM 500; 125 MG/1; MG/1
1 TABLET, FILM COATED ORAL EVERY 12 HOURS SCHEDULED
Qty: 19 TABLET | Refills: 0
Start: 2021-04-22 | End: 2021-05-02

## 2021-04-22 RX ORDER — AMOXICILLIN 250 MG
2 CAPSULE ORAL 2 TIMES DAILY PRN
Start: 2021-04-22 | End: 2021-08-19

## 2021-04-22 RX ORDER — FLUCONAZOLE 200 MG/1
400 TABLET ORAL EVERY 24 HOURS
Qty: 18 TABLET | Refills: 0
Start: 2021-04-22 | End: 2021-05-01

## 2021-04-22 RX ORDER — AMLODIPINE BESYLATE 5 MG/1
5 TABLET ORAL
Start: 2021-04-22 | End: 2021-06-26 | Stop reason: HOSPADM

## 2021-04-22 RX ORDER — PREGABALIN 100 MG/1
CAPSULE ORAL
Qty: 9 CAPSULE | Refills: 0 | Status: SHIPPED | OUTPATIENT
Start: 2021-04-22 | End: 2022-06-24 | Stop reason: SDUPTHER

## 2021-04-22 RX ADMIN — LIDOCAINE 1 PATCH: 50 PATCH CUTANEOUS at 08:04

## 2021-04-22 RX ADMIN — ALLOPURINOL 300 MG: 300 TABLET ORAL at 08:04

## 2021-04-22 RX ADMIN — Medication 250 MG: at 08:04

## 2021-04-22 RX ADMIN — PREGABALIN 100 MG: 100 CAPSULE ORAL at 08:04

## 2021-04-22 RX ADMIN — FLUTICASONE PROPIONATE 2 SPRAY: 50 SPRAY, METERED NASAL at 08:03

## 2021-04-22 RX ADMIN — HEPARIN SODIUM 5000 UNITS: 5000 INJECTION, SOLUTION INTRAVENOUS; SUBCUTANEOUS at 05:46

## 2021-04-22 RX ADMIN — AMLODIPINE BESYLATE 5 MG: 5 TABLET ORAL at 08:03

## 2021-04-22 RX ADMIN — INSULIN LISPRO 4 UNITS: 100 INJECTION, SOLUTION INTRAVENOUS; SUBCUTANEOUS at 08:04

## 2021-04-22 RX ADMIN — AMOXICILLIN AND CLAVULANATE POTASSIUM 500 MG: 500; 125 TABLET, FILM COATED ORAL at 08:03

## 2021-04-22 RX ADMIN — INSULIN LISPRO 5 UNITS: 100 INJECTION, SOLUTION INTRAVENOUS; SUBCUTANEOUS at 08:05

## 2021-04-22 NOTE — OUTREACH NOTE
Prep Survey      Responses   [facility] facility patient discharged from?  [place]   Is LACE score < 7 ?  No   Emergency Room discharge w/ pulse ox?  No   Eligibility  Not Eligible   What are the reasons patient is not eligible?  [facility]   Does the patient have one of the following disease processes/diagnoses(primary or secondary)?  Other   Prep survey completed?  Yes          Dimple Nayak RN

## 2021-04-28 ENCOUNTER — DOCUMENTATION (OUTPATIENT)
Dept: PHYSICAL THERAPY | Facility: HOSPITAL | Age: 81
End: 2021-04-28

## 2021-04-28 NOTE — THERAPY DISCHARGE NOTE
Outpatient Rehabilitation - Wound/Debridement D/C Summary        Patient Name: Susan Anderson  : 1940  MRN: [id number]  Today's Date: 2021                  Admit Date: (Not on file)    Visit Dx:  No diagnosis found.    Patient Active Problem List   Diagnosis   • Diabetic foot ulcer (CMS/HCC)   • PVD (peripheral vascular disease) (CMS/HCC)   • Osteomyelitis (CMS/HCC)   • Diabetes mellitus with neuropathy (CMS/HCC)   • Essential hypertension   • CKD (chronic kidney disease), stage III (CMS/HCC)   • Pyogenic inflammation of bone (CMS/HCC)   • Hyperglycemia        Past Medical History:   Diagnosis Date   • Cancer (CMS/HCC)     cervical cancer, skin cancer   • CKD (chronic kidney disease)    • Diabetes mellitus (CMS/HCC)    • Dyslipidemia    • Gout    • Hypertension    • Osteomyelitis (CMS/HCC)    • Peripheral neuropathy         Past Surgical History:   Procedure Laterality Date   • ABDOMINAL HYSTERECTOMY W/SALPINGECTOMY     • AORTAGRAM N/A 2021    Procedure: AORTAGRAM WITH OR WITHOUT RUNOFFS, WITH Co2;  Surgeon: Trey Forrest MD;  Location: [facility];  Service: Vascular;  Laterality: N/A;   • APPENDECTOMY     • BRAIN TUMOR EXCISION      laser surgery    • CATARACT EXTRACTION W/ INTRAOCULAR LENS  IMPLANT, BILATERAL     • CHOLECYSTECTOMY     • HYSTERECTOMY     • TRANS METATARSAL AMPUTATION Right 2021    Procedure: AMPUTATION TRANS METATARSAL RIGHT GREAT TOE;  Surgeon: Valdez Finney MD;  Location: [facility];  Service: Vascular;  Laterality: Right;         EVALUATION                WOUND DEBRIDEMENT                            Recommendation and Plan      Goals  PT OP Goals     Row Name 21 1017          PT Short Term Goals    STG 1  Patient will verbalize signs and symptoms of infection.  -     STG 1 Progress  Not Met  -MC     STG 2  Patient will present with a 25% reduction in R great toe wound as evidence of wound healing.   -     STG 2 Progress  Not Met  -        Long Term  Goals    LTG 1  Patient will present with a 75% reduction in R great toe wound as evidence of wound healing.   -     LTG 1 Progress  Not Met  -     LTG 2  Patient will demonstrate independence with clean home dressing management.  -     LTG 2 Progress  Not Met  -       User Key  (r) = Recorded By, (t) = Taken By, (c) = Cosigned By    Initials Name Provider Type    Lucila Mohna, PT Physical Therapist          Time Calculation:              OP Discharge Summary     Row Name 04/28/21 1018             OP PT Discharge Summary    Date of Discharge  04/08/21  -      Reason for Discharge  Transfer to other facility/level of care admitted to [facility]  -      Outcomes Achieved  Unable to make functional progress toward goals at this time  -      Discharge Destination  Unknown  -        User Key  (r) = Recorded By, (t) = Taken By, (c) = Cosigned By    Initials Name Provider Type    Lucila Mohan, PT Physical Therapist          Lucila Ulrich, PT  4/28/2021

## 2021-05-19 ENCOUNTER — PATIENT MESSAGE (OUTPATIENT)
Dept: CARDIAC SURGERY | Facility: CLINIC | Age: 81
End: 2021-05-19

## 2021-05-26 ENCOUNTER — OFFICE VISIT (OUTPATIENT)
Dept: CARDIAC SURGERY | Facility: CLINIC | Age: 81
End: 2021-05-26

## 2021-05-26 VITALS
TEMPERATURE: 97.7 F | WEIGHT: 180 LBS | BODY MASS INDEX: 33.13 KG/M2 | HEIGHT: 62 IN | HEART RATE: 98 BPM | SYSTOLIC BLOOD PRESSURE: 128 MMHG | DIASTOLIC BLOOD PRESSURE: 72 MMHG | OXYGEN SATURATION: 98 %

## 2021-05-26 DIAGNOSIS — I73.9 PVD (PERIPHERAL VASCULAR DISEASE) (HCC): Primary | ICD-10-CM

## 2021-05-26 DIAGNOSIS — I96 GANGRENE OF FOOT (HCC): ICD-10-CM

## 2021-05-26 PROCEDURE — 99024 POSTOP FOLLOW-UP VISIT: CPT | Performed by: THORACIC SURGERY (CARDIOTHORACIC VASCULAR SURGERY)

## 2021-05-26 RX ORDER — BLOOD SUGAR DIAGNOSTIC
STRIP MISCELLANEOUS
Status: ON HOLD | COMMUNITY
Start: 2021-04-12 | End: 2022-08-21

## 2021-05-26 RX ORDER — BUMETANIDE 0.5 MG/1
0.5 TABLET ORAL DAILY
COMMUNITY
End: 2021-06-26 | Stop reason: HOSPADM

## 2021-05-26 RX ORDER — LANCETS
EACH MISCELLANEOUS
COMMUNITY
Start: 2021-05-25 | End: 2022-03-22 | Stop reason: SDUPTHER

## 2021-05-27 NOTE — PROGRESS NOTES
"Patient Information  Susan Anderson                                                                                          [address] [place] [address]      1940  [unfilled]  [unfilled]    Chief Complaint   Patient presents with   • Follow-up     Hosp F/u s/p right great amp. 4/14/2021. Pt states that she is doing very well considering the amputation. Pt complains today of swelling in both legs with bilateral blisters on both left and right toes. Today Dr. Calderon saw this pt and request that you consult with him about her after this visit.        History of Present Illness: Patient seen for first postop visit following right great toe transmetatarsal amputation and primary closure performed April 14, 2021 for gangrene.    Vitals:    05/26/21 1413   BP: 128/72   Pulse: 98   Temp: 97.7 °F (36.5 °C)   SpO2: 98%   Weight: 81.6 kg (180 lb)   Height: 157.5 cm (62\")        Physical Exam patient's amputation site appears to be healing well.  one half the sutures removed and Steri-Strips were applied    Lab/other results:    Assessment: #1.  Postop right great toe transmetatarsal amputation primary closure performed on April 14, 2021 for exposed metatarsal phalangeal joint space and diabetic ulceration.  2.  Severe peripheral vascular disease unreconstructable right lower extremity    Plan: We will plan to see the patient back 3 weeks for follow-up and remove the remainder of skin sutures that time.  She is remain nonweightbearing the interim.    Valdez Finney M.D.   "

## 2021-06-04 ENCOUNTER — APPOINTMENT (OUTPATIENT)
Dept: CT IMAGING | Facility: HOSPITAL | Age: 81
End: 2021-06-04

## 2021-06-04 ENCOUNTER — HOSPITAL ENCOUNTER (INPATIENT)
Facility: HOSPITAL | Age: 81
LOS: 22 days | Discharge: NURSING, REHAB OR LONG-TERM CARE | End: 2021-06-26
Attending: EMERGENCY MEDICINE | Admitting: INTERNAL MEDICINE

## 2021-06-04 ENCOUNTER — APPOINTMENT (OUTPATIENT)
Dept: CARDIOLOGY | Facility: HOSPITAL | Age: 81
End: 2021-06-04

## 2021-06-04 ENCOUNTER — APPOINTMENT (OUTPATIENT)
Dept: GENERAL RADIOLOGY | Facility: HOSPITAL | Age: 81
End: 2021-06-04

## 2021-06-04 DIAGNOSIS — I50.21 ACUTE SYSTOLIC HEART FAILURE (HCC): ICD-10-CM

## 2021-06-04 DIAGNOSIS — I51.9 LV DYSFUNCTION: ICD-10-CM

## 2021-06-04 DIAGNOSIS — J81.0 ACUTE PULMONARY EDEMA (HCC): ICD-10-CM

## 2021-06-04 DIAGNOSIS — N17.9 AKI (ACUTE KIDNEY INJURY) (HCC): ICD-10-CM

## 2021-06-04 DIAGNOSIS — J18.9 PNEUMONIA DUE TO INFECTIOUS ORGANISM, UNSPECIFIED LATERALITY, UNSPECIFIED PART OF LUNG: Primary | ICD-10-CM

## 2021-06-04 DIAGNOSIS — A41.9 SEPSIS, DUE TO UNSPECIFIED ORGANISM, UNSPECIFIED WHETHER ACUTE ORGAN DYSFUNCTION PRESENT (HCC): ICD-10-CM

## 2021-06-04 PROBLEM — I50.9 HEART FAILURE: Status: ACTIVE | Noted: 2021-06-04

## 2021-06-04 LAB
ALBUMIN SERPL-MCNC: 2.9 G/DL (ref 3.5–5.2)
ALBUMIN/GLOB SERPL: 1 G/DL
ALP SERPL-CCNC: 100 U/L (ref 39–117)
ALT SERPL W P-5'-P-CCNC: 19 U/L (ref 1–33)
ANION GAP SERPL CALCULATED.3IONS-SCNC: 11 MMOL/L (ref 5–15)
AST SERPL-CCNC: 27 U/L (ref 1–32)
BASOPHILS # BLD AUTO: 0.03 10*3/MM3 (ref 0–0.2)
BASOPHILS NFR BLD AUTO: 0.3 % (ref 0–1.5)
BILIRUB SERPL-MCNC: 0.6 MG/DL (ref 0–1.2)
BUN SERPL-MCNC: 57 MG/DL (ref 8–23)
BUN/CREAT SERPL: 31.3 (ref 7–25)
CALCIUM SPEC-SCNC: 8.6 MG/DL (ref 8.6–10.5)
CHLORIDE SERPL-SCNC: 95 MMOL/L (ref 98–107)
CHOLEST SERPL-MCNC: 186 MG/DL (ref 0–200)
CO2 SERPL-SCNC: 28 MMOL/L (ref 22–29)
CREAT SERPL-MCNC: 1.82 MG/DL (ref 0.57–1)
D-LACTATE SERPL-SCNC: 1.3 MMOL/L (ref 0.5–2)
DEPRECATED RDW RBC AUTO: 46.8 FL (ref 37–54)
EOSINOPHIL # BLD AUTO: 0.01 10*3/MM3 (ref 0–0.4)
EOSINOPHIL NFR BLD AUTO: 0.1 % (ref 0.3–6.2)
ERYTHROCYTE [DISTWIDTH] IN BLOOD BY AUTOMATED COUNT: 13.6 % (ref 12.3–15.4)
FLUAV RNA RESP QL NAA+PROBE: NOT DETECTED
FLUBV RNA RESP QL NAA+PROBE: NOT DETECTED
GFR SERPL CREATININE-BSD FRML MDRD: 27 ML/MIN/1.73
GLOBULIN UR ELPH-MCNC: 3 GM/DL
GLUCOSE BLDC GLUCOMTR-MCNC: 271 MG/DL (ref 70–130)
GLUCOSE BLDC GLUCOMTR-MCNC: 274 MG/DL (ref 70–130)
GLUCOSE SERPL-MCNC: 212 MG/DL (ref 65–99)
HCT VFR BLD AUTO: 30.3 % (ref 34–46.6)
HDLC SERPL-MCNC: 40 MG/DL (ref 40–60)
HGB BLD-MCNC: 9.8 G/DL (ref 12–15.9)
HOLD SPECIMEN: NORMAL
IMM GRANULOCYTES # BLD AUTO: 0.09 10*3/MM3 (ref 0–0.05)
IMM GRANULOCYTES NFR BLD AUTO: 0.8 % (ref 0–0.5)
LDLC SERPL CALC-MCNC: 127 MG/DL (ref 0–100)
LDLC/HDLC SERPL: 3.14 {RATIO}
LYMPHOCYTES # BLD AUTO: 1.05 10*3/MM3 (ref 0.7–3.1)
LYMPHOCYTES NFR BLD AUTO: 9.1 % (ref 19.6–45.3)
MCH RBC QN AUTO: 30.7 PG (ref 26.6–33)
MCHC RBC AUTO-ENTMCNC: 32.3 G/DL (ref 31.5–35.7)
MCV RBC AUTO: 95 FL (ref 79–97)
MONOCYTES # BLD AUTO: 0.93 10*3/MM3 (ref 0.1–0.9)
MONOCYTES NFR BLD AUTO: 8 % (ref 5–12)
NEUTROPHILS NFR BLD AUTO: 81.7 % (ref 42.7–76)
NEUTROPHILS NFR BLD AUTO: 9.47 10*3/MM3 (ref 1.7–7)
NRBC BLD AUTO-RTO: 0.2 /100 WBC (ref 0–0.2)
NT-PROBNP SERPL-MCNC: ABNORMAL PG/ML (ref 0–1800)
PLATELET # BLD AUTO: 237 10*3/MM3 (ref 140–450)
PMV BLD AUTO: 11.1 FL (ref 6–12)
POTASSIUM SERPL-SCNC: 4.4 MMOL/L (ref 3.5–5.2)
PROCALCITONIN SERPL-MCNC: 0.4 NG/ML (ref 0–0.25)
PROT SERPL-MCNC: 5.9 G/DL (ref 6–8.5)
RBC # BLD AUTO: 3.19 10*6/MM3 (ref 3.77–5.28)
SARS-COV-2 RNA RESP QL NAA+PROBE: NOT DETECTED
SODIUM SERPL-SCNC: 134 MMOL/L (ref 136–145)
TRIGL SERPL-MCNC: 103 MG/DL (ref 0–150)
TROPONIN T SERPL-MCNC: 0.26 NG/ML (ref 0–0.03)
VLDLC SERPL-MCNC: 19 MG/DL (ref 5–40)
WBC # BLD AUTO: 11.58 10*3/MM3 (ref 3.4–10.8)
WHOLE BLOOD HOLD SPECIMEN: NORMAL
WHOLE BLOOD HOLD SPECIMEN: NORMAL

## 2021-06-04 PROCEDURE — 25010000002 CEFTRIAXONE PER 250 MG: Performed by: INTERNAL MEDICINE

## 2021-06-04 PROCEDURE — 84484 ASSAY OF TROPONIN QUANT: CPT | Performed by: EMERGENCY MEDICINE

## 2021-06-04 PROCEDURE — 71250 CT THORAX DX C-: CPT

## 2021-06-04 PROCEDURE — 80061 LIPID PANEL: CPT | Performed by: INTERNAL MEDICINE

## 2021-06-04 PROCEDURE — 87636 SARSCOV2 & INF A&B AMP PRB: CPT | Performed by: NURSE PRACTITIONER

## 2021-06-04 PROCEDURE — 85025 COMPLETE CBC W/AUTO DIFF WBC: CPT | Performed by: EMERGENCY MEDICINE

## 2021-06-04 PROCEDURE — 82962 GLUCOSE BLOOD TEST: CPT

## 2021-06-04 PROCEDURE — 80053 COMPREHEN METABOLIC PANEL: CPT | Performed by: EMERGENCY MEDICINE

## 2021-06-04 PROCEDURE — 25010000002 HEPARIN (PORCINE) PER 1000 UNITS: Performed by: INTERNAL MEDICINE

## 2021-06-04 PROCEDURE — 25010000002 VANCOMYCIN 10 G RECONSTITUTED SOLUTION: Performed by: NURSE PRACTITIONER

## 2021-06-04 PROCEDURE — 93306 TTE W/DOPPLER COMPLETE: CPT | Performed by: INTERNAL MEDICINE

## 2021-06-04 PROCEDURE — 87040 BLOOD CULTURE FOR BACTERIA: CPT | Performed by: NURSE PRACTITIONER

## 2021-06-04 PROCEDURE — 99222 1ST HOSP IP/OBS MODERATE 55: CPT | Performed by: INTERNAL MEDICINE

## 2021-06-04 PROCEDURE — 94799 UNLISTED PULMONARY SVC/PX: CPT

## 2021-06-04 PROCEDURE — 83605 ASSAY OF LACTIC ACID: CPT | Performed by: NURSE PRACTITIONER

## 2021-06-04 PROCEDURE — 63710000001 INSULIN DETEMIR PER 5 UNITS: Performed by: INTERNAL MEDICINE

## 2021-06-04 PROCEDURE — 71045 X-RAY EXAM CHEST 1 VIEW: CPT

## 2021-06-04 PROCEDURE — 25010000002 PIPERACILLIN SOD-TAZOBACTAM PER 1 G: Performed by: NURSE PRACTITIONER

## 2021-06-04 PROCEDURE — 99223 1ST HOSP IP/OBS HIGH 75: CPT | Performed by: INTERNAL MEDICINE

## 2021-06-04 PROCEDURE — 99284 EMERGENCY DEPT VISIT MOD MDM: CPT

## 2021-06-04 PROCEDURE — 93306 TTE W/DOPPLER COMPLETE: CPT

## 2021-06-04 PROCEDURE — 84145 PROCALCITONIN (PCT): CPT | Performed by: NURSE PRACTITIONER

## 2021-06-04 PROCEDURE — 83880 ASSAY OF NATRIURETIC PEPTIDE: CPT | Performed by: EMERGENCY MEDICINE

## 2021-06-04 PROCEDURE — 93005 ELECTROCARDIOGRAM TRACING: CPT | Performed by: EMERGENCY MEDICINE

## 2021-06-04 PROCEDURE — 25010000002 FUROSEMIDE PER 20 MG: Performed by: NURSE PRACTITIONER

## 2021-06-04 PROCEDURE — 63710000001 INSULIN LISPRO (HUMAN) PER 5 UNITS: Performed by: INTERNAL MEDICINE

## 2021-06-04 RX ORDER — ONDANSETRON 4 MG/1
4 TABLET, FILM COATED ORAL EVERY 6 HOURS PRN
Status: DISCONTINUED | OUTPATIENT
Start: 2021-06-04 | End: 2021-06-26 | Stop reason: HOSPADM

## 2021-06-04 RX ORDER — FUROSEMIDE 10 MG/ML
40 INJECTION INTRAMUSCULAR; INTRAVENOUS ONCE
Status: COMPLETED | OUTPATIENT
Start: 2021-06-04 | End: 2021-06-04

## 2021-06-04 RX ORDER — SODIUM CHLORIDE 0.9 % (FLUSH) 0.9 %
10 SYRINGE (ML) INJECTION EVERY 12 HOURS SCHEDULED
Status: DISCONTINUED | OUTPATIENT
Start: 2021-06-04 | End: 2021-06-26 | Stop reason: HOSPADM

## 2021-06-04 RX ORDER — DICYCLOMINE HCL 20 MG
10 TABLET ORAL 4 TIMES DAILY PRN
Status: DISCONTINUED | OUTPATIENT
Start: 2021-06-04 | End: 2021-06-26 | Stop reason: HOSPADM

## 2021-06-04 RX ORDER — TRAMADOL HYDROCHLORIDE 50 MG/1
50 TABLET ORAL EVERY 8 HOURS PRN
Status: DISCONTINUED | OUTPATIENT
Start: 2021-06-04 | End: 2021-06-10

## 2021-06-04 RX ORDER — ASPIRIN 81 MG/1
81 TABLET ORAL DAILY
Status: DISCONTINUED | OUTPATIENT
Start: 2021-06-04 | End: 2021-06-26 | Stop reason: HOSPADM

## 2021-06-04 RX ORDER — ACETAMINOPHEN 160 MG/5ML
650 SOLUTION ORAL EVERY 4 HOURS PRN
Status: DISCONTINUED | OUTPATIENT
Start: 2021-06-04 | End: 2021-06-26 | Stop reason: HOSPADM

## 2021-06-04 RX ORDER — PREGABALIN 50 MG/1
100 CAPSULE ORAL DAILY
Status: DISCONTINUED | OUTPATIENT
Start: 2021-06-04 | End: 2021-06-26 | Stop reason: HOSPADM

## 2021-06-04 RX ORDER — AMOXICILLIN 250 MG
2 CAPSULE ORAL 2 TIMES DAILY
Status: DISCONTINUED | OUTPATIENT
Start: 2021-06-04 | End: 2021-06-26 | Stop reason: HOSPADM

## 2021-06-04 RX ORDER — BISACODYL 10 MG
10 SUPPOSITORY, RECTAL RECTAL DAILY PRN
Status: DISCONTINUED | OUTPATIENT
Start: 2021-06-04 | End: 2021-06-26 | Stop reason: HOSPADM

## 2021-06-04 RX ORDER — NICOTINE POLACRILEX 4 MG
15 LOZENGE BUCCAL
Status: DISCONTINUED | OUTPATIENT
Start: 2021-06-04 | End: 2021-06-26 | Stop reason: HOSPADM

## 2021-06-04 RX ORDER — ALLOPURINOL 100 MG/1
100 TABLET ORAL DAILY
Status: DISCONTINUED | OUTPATIENT
Start: 2021-06-04 | End: 2021-06-26 | Stop reason: HOSPADM

## 2021-06-04 RX ORDER — DEXTROSE MONOHYDRATE 25 G/50ML
25 INJECTION, SOLUTION INTRAVENOUS
Status: DISCONTINUED | OUTPATIENT
Start: 2021-06-04 | End: 2021-06-26 | Stop reason: HOSPADM

## 2021-06-04 RX ORDER — FUROSEMIDE 10 MG/ML
40 INJECTION INTRAMUSCULAR; INTRAVENOUS EVERY 12 HOURS
Status: DISCONTINUED | OUTPATIENT
Start: 2021-06-04 | End: 2021-06-04

## 2021-06-04 RX ORDER — ONDANSETRON 2 MG/ML
4 INJECTION INTRAMUSCULAR; INTRAVENOUS EVERY 6 HOURS PRN
Status: DISCONTINUED | OUTPATIENT
Start: 2021-06-04 | End: 2021-06-26 | Stop reason: HOSPADM

## 2021-06-04 RX ORDER — ACETAMINOPHEN 325 MG/1
650 TABLET ORAL EVERY 4 HOURS PRN
Status: DISCONTINUED | OUTPATIENT
Start: 2021-06-04 | End: 2021-06-26 | Stop reason: HOSPADM

## 2021-06-04 RX ORDER — BUMETANIDE 0.25 MG/ML
2 INJECTION INTRAMUSCULAR; INTRAVENOUS ONCE
Status: COMPLETED | OUTPATIENT
Start: 2021-06-04 | End: 2021-06-04

## 2021-06-04 RX ORDER — SODIUM CHLORIDE 0.9 % (FLUSH) 0.9 %
10 SYRINGE (ML) INJECTION AS NEEDED
Status: DISCONTINUED | OUTPATIENT
Start: 2021-06-04 | End: 2021-06-26 | Stop reason: HOSPADM

## 2021-06-04 RX ORDER — ACETAMINOPHEN 650 MG/1
650 SUPPOSITORY RECTAL EVERY 4 HOURS PRN
Status: DISCONTINUED | OUTPATIENT
Start: 2021-06-04 | End: 2021-06-26 | Stop reason: HOSPADM

## 2021-06-04 RX ORDER — SACCHAROMYCES BOULARDII 250 MG
250 CAPSULE ORAL 2 TIMES DAILY
Status: DISCONTINUED | OUTPATIENT
Start: 2021-06-04 | End: 2021-06-26 | Stop reason: HOSPADM

## 2021-06-04 RX ORDER — HEPARIN SODIUM 5000 [USP'U]/ML
5000 INJECTION, SOLUTION INTRAVENOUS; SUBCUTANEOUS EVERY 8 HOURS SCHEDULED
Status: DISCONTINUED | OUTPATIENT
Start: 2021-06-04 | End: 2021-06-08 | Stop reason: ALTCHOICE

## 2021-06-04 RX ORDER — NITROGLYCERIN 20 MG/100ML
10-50 INJECTION INTRAVENOUS
Status: DISCONTINUED | OUTPATIENT
Start: 2021-06-04 | End: 2021-06-05

## 2021-06-04 RX ORDER — BISACODYL 5 MG/1
5 TABLET, DELAYED RELEASE ORAL DAILY PRN
Status: DISCONTINUED | OUTPATIENT
Start: 2021-06-04 | End: 2021-06-26 | Stop reason: HOSPADM

## 2021-06-04 RX ORDER — POLYETHYLENE GLYCOL 3350 17 G/17G
17 POWDER, FOR SOLUTION ORAL DAILY PRN
Status: DISCONTINUED | OUTPATIENT
Start: 2021-06-04 | End: 2021-06-26 | Stop reason: HOSPADM

## 2021-06-04 RX ADMIN — INSULIN DETEMIR 10 UNITS: 100 INJECTION, SOLUTION SUBCUTANEOUS at 21:15

## 2021-06-04 RX ADMIN — Medication 250 MG: at 21:14

## 2021-06-04 RX ADMIN — BUMETANIDE 2 MG: 0.25 INJECTION, SOLUTION INTRAMUSCULAR; INTRAVENOUS at 18:04

## 2021-06-04 RX ADMIN — INSULIN LISPRO 4 UNITS: 100 INJECTION, SOLUTION INTRAVENOUS; SUBCUTANEOUS at 18:04

## 2021-06-04 RX ADMIN — TAZOBACTAM SODIUM AND PIPERACILLIN SODIUM 3.38 G: 375; 3 INJECTION, SOLUTION INTRAVENOUS at 11:29

## 2021-06-04 RX ADMIN — SODIUM CHLORIDE, PRESERVATIVE FREE 10 ML: 5 INJECTION INTRAVENOUS at 21:14

## 2021-06-04 RX ADMIN — HEPARIN SODIUM 5000 UNITS: 5000 INJECTION INTRAVENOUS; SUBCUTANEOUS at 21:14

## 2021-06-04 RX ADMIN — VANCOMYCIN HYDROCHLORIDE 1750 MG: 10 INJECTION, POWDER, LYOPHILIZED, FOR SOLUTION INTRAVENOUS at 11:56

## 2021-06-04 RX ADMIN — NITROGLYCERIN 10 MCG/MIN: 20 INJECTION INTRAVENOUS at 11:31

## 2021-06-04 RX ADMIN — FUROSEMIDE 40 MG: 10 INJECTION, SOLUTION INTRAMUSCULAR; INTRAVENOUS at 11:25

## 2021-06-04 RX ADMIN — SODIUM CHLORIDE 1 G: 900 INJECTION INTRAVENOUS at 16:42

## 2021-06-04 RX ADMIN — ASPIRIN 81 MG: 81 TABLET, COATED ORAL at 14:28

## 2021-06-04 RX ADMIN — DOCUSATE SODIUM 50MG AND SENNOSIDES 8.6MG 2 TABLET: 8.6; 5 TABLET, FILM COATED ORAL at 21:14

## 2021-06-04 RX ADMIN — HEPARIN SODIUM 5000 UNITS: 5000 INJECTION INTRAVENOUS; SUBCUTANEOUS at 14:28

## 2021-06-04 RX ADMIN — PREGABALIN 100 MG: 100 CAPSULE ORAL at 14:28

## 2021-06-05 ENCOUNTER — APPOINTMENT (OUTPATIENT)
Dept: GENERAL RADIOLOGY | Facility: HOSPITAL | Age: 81
End: 2021-06-05

## 2021-06-05 PROBLEM — J96.01 ACUTE RESPIRATORY FAILURE WITH HYPOXIA: Status: ACTIVE | Noted: 2021-06-05

## 2021-06-05 LAB
ANION GAP SERPL CALCULATED.3IONS-SCNC: 10 MMOL/L (ref 5–15)
ARTERIAL PATENCY WRIST A: POSITIVE
ARTERIAL PATENCY WRIST A: POSITIVE
ATMOSPHERIC PRESS: ABNORMAL MM[HG]
ATMOSPHERIC PRESS: ABNORMAL MM[HG]
BASE EXCESS BLDA CALC-SCNC: 3.1 MMOL/L (ref 0–2)
BASE EXCESS BLDA CALC-SCNC: 4.4 MMOL/L (ref 0–2)
BDY SITE: ABNORMAL
BDY SITE: ABNORMAL
BODY TEMPERATURE: 37 C
BODY TEMPERATURE: 37 C
BUN SERPL-MCNC: 60 MG/DL (ref 8–23)
BUN/CREAT SERPL: 35.1 (ref 7–25)
CALCIUM SPEC-SCNC: 8.3 MG/DL (ref 8.6–10.5)
CHLORIDE SERPL-SCNC: 95 MMOL/L (ref 98–107)
CO2 BLDA-SCNC: 29.8 MMOL/L (ref 22–33)
CO2 BLDA-SCNC: 30 MMOL/L (ref 22–33)
CO2 SERPL-SCNC: 28 MMOL/L (ref 22–29)
COHGB MFR BLD: 0.6 % (ref 0–2)
COHGB MFR BLD: 0.7 % (ref 0–2)
CREAT SERPL-MCNC: 1.71 MG/DL (ref 0.57–1)
DEPRECATED RDW RBC AUTO: 45.7 FL (ref 37–54)
EPAP: 0
EPAP: 0
ERYTHROCYTE [DISTWIDTH] IN BLOOD BY AUTOMATED COUNT: 13.5 % (ref 12.3–15.4)
GFR SERPL CREATININE-BSD FRML MDRD: 29 ML/MIN/1.73
GLUCOSE BLDC GLUCOMTR-MCNC: 215 MG/DL (ref 70–130)
GLUCOSE BLDC GLUCOMTR-MCNC: 245 MG/DL (ref 70–130)
GLUCOSE BLDC GLUCOMTR-MCNC: 251 MG/DL (ref 70–130)
GLUCOSE BLDC GLUCOMTR-MCNC: 261 MG/DL (ref 70–130)
GLUCOSE BLDC GLUCOMTR-MCNC: 312 MG/DL (ref 70–130)
GLUCOSE SERPL-MCNC: 292 MG/DL (ref 65–99)
HCO3 BLDA-SCNC: 28.4 MMOL/L (ref 20–26)
HCO3 BLDA-SCNC: 28.7 MMOL/L (ref 20–26)
HCT VFR BLD AUTO: 29.5 % (ref 34–46.6)
HCT VFR BLD CALC: 30.1 %
HCT VFR BLD CALC: 30.3 %
HGB BLD-MCNC: 9.6 G/DL (ref 12–15.9)
HGB BLDA-MCNC: 9.8 G/DL (ref 14–18)
HGB BLDA-MCNC: 9.9 G/DL (ref 14–18)
INHALED O2 CONCENTRATION: 70 %
INHALED O2 CONCENTRATION: 75 %
IPAP: 0
IPAP: 0
MAGNESIUM SERPL-MCNC: 1.7 MG/DL (ref 1.6–2.4)
MCH RBC QN AUTO: 30.3 PG (ref 26.6–33)
MCHC RBC AUTO-ENTMCNC: 32.5 G/DL (ref 31.5–35.7)
MCV RBC AUTO: 93.1 FL (ref 79–97)
METHGB BLD QL: 0.3 % (ref 0–1.5)
METHGB BLD QL: 0.4 % (ref 0–1.5)
MODALITY: ABNORMAL
MODALITY: ABNORMAL
NOTE: ABNORMAL
NOTE: ABNORMAL
NT-PROBNP SERPL-MCNC: ABNORMAL PG/ML (ref 0–1800)
OXYHGB MFR BLDV: 94.1 % (ref 94–99)
OXYHGB MFR BLDV: 99.1 % (ref 94–99)
PAW @ PEAK INSP FLOW SETTING VENT: 0 CMH2O
PAW @ PEAK INSP FLOW SETTING VENT: 0 CMH2O
PCO2 BLDA: 40.9 MM HG (ref 35–45)
PCO2 BLDA: 45.9 MM HG (ref 35–45)
PCO2 TEMP ADJ BLD: 40.9 MM HG (ref 35–45)
PCO2 TEMP ADJ BLD: 45.9 MM HG (ref 35–45)
PH BLDA: 7.4 PH UNITS (ref 7.35–7.45)
PH BLDA: 7.46 PH UNITS (ref 7.35–7.45)
PH, TEMP CORRECTED: 7.4 PH UNITS
PH, TEMP CORRECTED: 7.46 PH UNITS
PLATELET # BLD AUTO: 229 10*3/MM3 (ref 140–450)
PMV BLD AUTO: 11.7 FL (ref 6–12)
PO2 BLDA: 185 MM HG (ref 83–108)
PO2 BLDA: 73.3 MM HG (ref 83–108)
PO2 TEMP ADJ BLD: 185 MM HG (ref 83–108)
PO2 TEMP ADJ BLD: 73.3 MM HG (ref 83–108)
POTASSIUM SERPL-SCNC: 4.1 MMOL/L (ref 3.5–5.2)
RBC # BLD AUTO: 3.17 10*6/MM3 (ref 3.77–5.28)
SODIUM SERPL-SCNC: 133 MMOL/L (ref 136–145)
TOTAL RATE: 0 BREATHS/MINUTE
TOTAL RATE: 0 BREATHS/MINUTE
WBC # BLD AUTO: 9.52 10*3/MM3 (ref 3.4–10.8)

## 2021-06-05 PROCEDURE — 82375 ASSAY CARBOXYHB QUANT: CPT

## 2021-06-05 PROCEDURE — 85027 COMPLETE CBC AUTOMATED: CPT | Performed by: INTERNAL MEDICINE

## 2021-06-05 PROCEDURE — 94799 UNLISTED PULMONARY SVC/PX: CPT

## 2021-06-05 PROCEDURE — 83880 ASSAY OF NATRIURETIC PEPTIDE: CPT | Performed by: INTERNAL MEDICINE

## 2021-06-05 PROCEDURE — 82962 GLUCOSE BLOOD TEST: CPT

## 2021-06-05 PROCEDURE — 25010000002 HEPARIN (PORCINE) PER 1000 UNITS: Performed by: INTERNAL MEDICINE

## 2021-06-05 PROCEDURE — 99024 POSTOP FOLLOW-UP VISIT: CPT | Performed by: THORACIC SURGERY (CARDIOTHORACIC VASCULAR SURGERY)

## 2021-06-05 PROCEDURE — 99232 SBSQ HOSP IP/OBS MODERATE 35: CPT | Performed by: INTERNAL MEDICINE

## 2021-06-05 PROCEDURE — 94660 CPAP INITIATION&MGMT: CPT

## 2021-06-05 PROCEDURE — 80048 BASIC METABOLIC PNL TOTAL CA: CPT | Performed by: INTERNAL MEDICINE

## 2021-06-05 PROCEDURE — 25010000002 CEFTRIAXONE PER 250 MG: Performed by: INTERNAL MEDICINE

## 2021-06-05 PROCEDURE — 99233 SBSQ HOSP IP/OBS HIGH 50: CPT | Performed by: INTERNAL MEDICINE

## 2021-06-05 PROCEDURE — 63710000001 INSULIN DETEMIR PER 5 UNITS: Performed by: INTERNAL MEDICINE

## 2021-06-05 PROCEDURE — 82805 BLOOD GASES W/O2 SATURATION: CPT

## 2021-06-05 PROCEDURE — 71045 X-RAY EXAM CHEST 1 VIEW: CPT

## 2021-06-05 PROCEDURE — 83735 ASSAY OF MAGNESIUM: CPT | Performed by: INTERNAL MEDICINE

## 2021-06-05 PROCEDURE — 36600 WITHDRAWAL OF ARTERIAL BLOOD: CPT

## 2021-06-05 PROCEDURE — 83050 HGB METHEMOGLOBIN QUAN: CPT

## 2021-06-05 PROCEDURE — 94640 AIRWAY INHALATION TREATMENT: CPT

## 2021-06-05 PROCEDURE — 63710000001 INSULIN LISPRO (HUMAN) PER 5 UNITS: Performed by: INTERNAL MEDICINE

## 2021-06-05 RX ORDER — CARVEDILOL 6.25 MG/1
6.25 TABLET ORAL 2 TIMES DAILY WITH MEALS
Status: DISCONTINUED | OUTPATIENT
Start: 2021-06-05 | End: 2021-06-11

## 2021-06-05 RX ORDER — IPRATROPIUM BROMIDE AND ALBUTEROL SULFATE 2.5; .5 MG/3ML; MG/3ML
3 SOLUTION RESPIRATORY (INHALATION)
Status: DISCONTINUED | OUTPATIENT
Start: 2021-06-05 | End: 2021-06-26 | Stop reason: HOSPADM

## 2021-06-05 RX ORDER — FUROSEMIDE 10 MG/ML
40 INJECTION INTRAMUSCULAR; INTRAVENOUS ONCE
Status: COMPLETED | OUTPATIENT
Start: 2021-06-05 | End: 2021-06-06

## 2021-06-05 RX ORDER — HYDROXYZINE HYDROCHLORIDE 25 MG/1
25 TABLET, FILM COATED ORAL 3 TIMES DAILY PRN
Status: DISCONTINUED | OUTPATIENT
Start: 2021-06-05 | End: 2021-06-26 | Stop reason: HOSPADM

## 2021-06-05 RX ORDER — ACETAMINOPHEN 325 MG/1
650 TABLET ORAL ONCE
Status: COMPLETED | OUTPATIENT
Start: 2021-06-05 | End: 2021-06-06

## 2021-06-05 RX ORDER — ALBUTEROL SULFATE 2.5 MG/3ML
2.5 SOLUTION RESPIRATORY (INHALATION) EVERY 6 HOURS PRN
Status: DISCONTINUED | OUTPATIENT
Start: 2021-06-05 | End: 2021-06-26 | Stop reason: HOSPADM

## 2021-06-05 RX ADMIN — DOCUSATE SODIUM 50MG AND SENNOSIDES 8.6MG 2 TABLET: 8.6; 5 TABLET, FILM COATED ORAL at 09:42

## 2021-06-05 RX ADMIN — SODIUM CHLORIDE, PRESERVATIVE FREE 10 ML: 5 INJECTION INTRAVENOUS at 09:42

## 2021-06-05 RX ADMIN — HEPARIN SODIUM 5000 UNITS: 5000 INJECTION INTRAVENOUS; SUBCUTANEOUS at 20:41

## 2021-06-05 RX ADMIN — INSULIN DETEMIR 8 UNITS: 100 INJECTION, SOLUTION SUBCUTANEOUS at 09:41

## 2021-06-05 RX ADMIN — INSULIN LISPRO 3 UNITS: 100 INJECTION, SOLUTION INTRAVENOUS; SUBCUTANEOUS at 09:41

## 2021-06-05 RX ADMIN — Medication 250 MG: at 09:42

## 2021-06-05 RX ADMIN — CARVEDILOL 6.25 MG: 6.25 TABLET, FILM COATED ORAL at 17:57

## 2021-06-05 RX ADMIN — Medication 250 MG: at 20:37

## 2021-06-05 RX ADMIN — HEPARIN SODIUM 5000 UNITS: 5000 INJECTION INTRAVENOUS; SUBCUTANEOUS at 06:32

## 2021-06-05 RX ADMIN — INSULIN LISPRO 5 UNITS: 100 INJECTION, SOLUTION INTRAVENOUS; SUBCUTANEOUS at 12:13

## 2021-06-05 RX ADMIN — PREGABALIN 100 MG: 100 CAPSULE ORAL at 09:42

## 2021-06-05 RX ADMIN — ASPIRIN 81 MG: 81 TABLET, COATED ORAL at 09:42

## 2021-06-05 RX ADMIN — CARVEDILOL 6.25 MG: 6.25 TABLET, FILM COATED ORAL at 09:42

## 2021-06-05 RX ADMIN — ALLOPURINOL 100 MG: 100 TABLET ORAL at 09:42

## 2021-06-05 RX ADMIN — HEPARIN SODIUM 5000 UNITS: 5000 INJECTION INTRAVENOUS; SUBCUTANEOUS at 14:54

## 2021-06-05 RX ADMIN — INSULIN DETEMIR 8 UNITS: 100 INJECTION, SOLUTION SUBCUTANEOUS at 20:39

## 2021-06-05 RX ADMIN — HYDROXYZINE HYDROCHLORIDE 25 MG: 25 TABLET, FILM COATED ORAL at 20:37

## 2021-06-05 RX ADMIN — DOCUSATE SODIUM 50MG AND SENNOSIDES 8.6MG 2 TABLET: 8.6; 5 TABLET, FILM COATED ORAL at 20:37

## 2021-06-05 RX ADMIN — INSULIN LISPRO 4 UNITS: 100 INJECTION, SOLUTION INTRAVENOUS; SUBCUTANEOUS at 17:57

## 2021-06-05 RX ADMIN — SODIUM CHLORIDE, PRESERVATIVE FREE 10 ML: 5 INJECTION INTRAVENOUS at 20:39

## 2021-06-05 RX ADMIN — IPRATROPIUM BROMIDE AND ALBUTEROL SULFATE 3 ML: 2.5; .5 SOLUTION RESPIRATORY (INHALATION) at 20:59

## 2021-06-05 RX ADMIN — SODIUM CHLORIDE 1 G: 900 INJECTION INTRAVENOUS at 14:55

## 2021-06-06 LAB
ALBUMIN SERPL-MCNC: 2.5 G/DL (ref 3.5–5.2)
ALBUMIN/GLOB SERPL: 0.8 G/DL
ALP SERPL-CCNC: 107 U/L (ref 39–117)
ALT SERPL W P-5'-P-CCNC: 17 U/L (ref 1–33)
ANION GAP SERPL CALCULATED.3IONS-SCNC: 10 MMOL/L (ref 5–15)
AST SERPL-CCNC: 20 U/L (ref 1–32)
BILIRUB SERPL-MCNC: 0.3 MG/DL (ref 0–1.2)
BUN SERPL-MCNC: 65 MG/DL (ref 8–23)
BUN/CREAT SERPL: 38.7 (ref 7–25)
CALCIUM SPEC-SCNC: 8.3 MG/DL (ref 8.6–10.5)
CHLORIDE SERPL-SCNC: 98 MMOL/L (ref 98–107)
CO2 SERPL-SCNC: 28 MMOL/L (ref 22–29)
CREAT SERPL-MCNC: 1.68 MG/DL (ref 0.57–1)
GFR SERPL CREATININE-BSD FRML MDRD: 29 ML/MIN/1.73
GLOBULIN UR ELPH-MCNC: 3 GM/DL
GLUCOSE BLDC GLUCOMTR-MCNC: 108 MG/DL (ref 70–130)
GLUCOSE BLDC GLUCOMTR-MCNC: 120 MG/DL (ref 70–130)
GLUCOSE BLDC GLUCOMTR-MCNC: 147 MG/DL (ref 70–130)
GLUCOSE BLDC GLUCOMTR-MCNC: 242 MG/DL (ref 70–130)
GLUCOSE SERPL-MCNC: 163 MG/DL (ref 65–99)
POTASSIUM SERPL-SCNC: 4.1 MMOL/L (ref 3.5–5.2)
PROT SERPL-MCNC: 5.5 G/DL (ref 6–8.5)
SODIUM SERPL-SCNC: 136 MMOL/L (ref 136–145)

## 2021-06-06 PROCEDURE — 97165 OT EVAL LOW COMPLEX 30 MIN: CPT | Performed by: OCCUPATIONAL THERAPIST

## 2021-06-06 PROCEDURE — 25010000002 HEPARIN (PORCINE) PER 1000 UNITS: Performed by: INTERNAL MEDICINE

## 2021-06-06 PROCEDURE — 97162 PT EVAL MOD COMPLEX 30 MIN: CPT

## 2021-06-06 PROCEDURE — 94799 UNLISTED PULMONARY SVC/PX: CPT

## 2021-06-06 PROCEDURE — 63710000001 INSULIN LISPRO (HUMAN) PER 5 UNITS: Performed by: INTERNAL MEDICINE

## 2021-06-06 PROCEDURE — 63710000001 INSULIN DETEMIR PER 5 UNITS: Performed by: INTERNAL MEDICINE

## 2021-06-06 PROCEDURE — 97110 THERAPEUTIC EXERCISES: CPT

## 2021-06-06 PROCEDURE — 82962 GLUCOSE BLOOD TEST: CPT

## 2021-06-06 PROCEDURE — 99024 POSTOP FOLLOW-UP VISIT: CPT | Performed by: THORACIC SURGERY (CARDIOTHORACIC VASCULAR SURGERY)

## 2021-06-06 PROCEDURE — 99232 SBSQ HOSP IP/OBS MODERATE 35: CPT | Performed by: INTERNAL MEDICINE

## 2021-06-06 PROCEDURE — 80053 COMPREHEN METABOLIC PANEL: CPT | Performed by: INTERNAL MEDICINE

## 2021-06-06 PROCEDURE — 25010000002 PIPERACILLIN SOD-TAZOBACTAM PER 1 G: Performed by: NURSE PRACTITIONER

## 2021-06-06 PROCEDURE — 25010000002 FUROSEMIDE PER 20 MG: Performed by: INTERNAL MEDICINE

## 2021-06-06 PROCEDURE — 99233 SBSQ HOSP IP/OBS HIGH 50: CPT | Performed by: INTERNAL MEDICINE

## 2021-06-06 RX ORDER — METOLAZONE 5 MG/1
5 TABLET ORAL ONCE
Status: COMPLETED | OUTPATIENT
Start: 2021-06-06 | End: 2021-06-06

## 2021-06-06 RX ADMIN — TAZOBACTAM SODIUM AND PIPERACILLIN SODIUM 3.38 G: 375; 3 INJECTION, SOLUTION INTRAVENOUS at 00:49

## 2021-06-06 RX ADMIN — HEPARIN SODIUM 5000 UNITS: 5000 INJECTION INTRAVENOUS; SUBCUTANEOUS at 05:11

## 2021-06-06 RX ADMIN — METOLAZONE 5 MG: 5 TABLET ORAL at 11:45

## 2021-06-06 RX ADMIN — ASPIRIN 81 MG: 81 TABLET, COATED ORAL at 08:51

## 2021-06-06 RX ADMIN — TAZOBACTAM SODIUM AND PIPERACILLIN SODIUM 3.38 G: 375; 3 INJECTION, SOLUTION INTRAVENOUS at 11:46

## 2021-06-06 RX ADMIN — IPRATROPIUM BROMIDE AND ALBUTEROL SULFATE 3 ML: 2.5; .5 SOLUTION RESPIRATORY (INHALATION) at 20:09

## 2021-06-06 RX ADMIN — INSULIN LISPRO 3 UNITS: 100 INJECTION, SOLUTION INTRAVENOUS; SUBCUTANEOUS at 11:46

## 2021-06-06 RX ADMIN — IPRATROPIUM BROMIDE AND ALBUTEROL SULFATE 3 ML: 2.5; .5 SOLUTION RESPIRATORY (INHALATION) at 16:36

## 2021-06-06 RX ADMIN — FUROSEMIDE 40 MG: 10 INJECTION, SOLUTION INTRAMUSCULAR; INTRAVENOUS at 00:49

## 2021-06-06 RX ADMIN — Medication 250 MG: at 08:51

## 2021-06-06 RX ADMIN — PREGABALIN 100 MG: 100 CAPSULE ORAL at 08:51

## 2021-06-06 RX ADMIN — ACETAMINOPHEN 650 MG: 325 TABLET ORAL at 00:50

## 2021-06-06 RX ADMIN — IPRATROPIUM BROMIDE AND ALBUTEROL SULFATE 3 ML: 2.5; .5 SOLUTION RESPIRATORY (INHALATION) at 13:13

## 2021-06-06 RX ADMIN — ALLOPURINOL 100 MG: 100 TABLET ORAL at 08:51

## 2021-06-06 RX ADMIN — INSULIN LISPRO 3 UNITS: 100 INJECTION, SOLUTION INTRAVENOUS; SUBCUTANEOUS at 08:52

## 2021-06-06 RX ADMIN — INSULIN DETEMIR 10 UNITS: 100 INJECTION, SOLUTION SUBCUTANEOUS at 20:46

## 2021-06-06 RX ADMIN — INSULIN DETEMIR 10 UNITS: 100 INJECTION, SOLUTION SUBCUTANEOUS at 08:52

## 2021-06-06 RX ADMIN — DOCUSATE SODIUM 50MG AND SENNOSIDES 8.6MG 2 TABLET: 8.6; 5 TABLET, FILM COATED ORAL at 20:46

## 2021-06-06 RX ADMIN — SODIUM CHLORIDE, PRESERVATIVE FREE 10 ML: 5 INJECTION INTRAVENOUS at 20:50

## 2021-06-06 RX ADMIN — INSULIN LISPRO 3 UNITS: 100 INJECTION, SOLUTION INTRAVENOUS; SUBCUTANEOUS at 17:22

## 2021-06-06 RX ADMIN — CARVEDILOL 6.25 MG: 6.25 TABLET, FILM COATED ORAL at 17:22

## 2021-06-06 RX ADMIN — IPRATROPIUM BROMIDE AND ALBUTEROL SULFATE 3 ML: 2.5; .5 SOLUTION RESPIRATORY (INHALATION) at 08:05

## 2021-06-06 RX ADMIN — POLYETHYLENE GLYCOL 3350 17 G: 17 POWDER, FOR SOLUTION ORAL at 20:50

## 2021-06-06 RX ADMIN — DOXYCYCLINE 100 MG: 100 INJECTION, POWDER, LYOPHILIZED, FOR SOLUTION INTRAVENOUS at 08:51

## 2021-06-06 RX ADMIN — HEPARIN SODIUM 5000 UNITS: 5000 INJECTION INTRAVENOUS; SUBCUTANEOUS at 15:02

## 2021-06-06 RX ADMIN — CARVEDILOL 6.25 MG: 6.25 TABLET, FILM COATED ORAL at 08:51

## 2021-06-06 RX ADMIN — HYDROXYZINE HYDROCHLORIDE 25 MG: 25 TABLET, FILM COATED ORAL at 20:46

## 2021-06-06 RX ADMIN — TAZOBACTAM SODIUM AND PIPERACILLIN SODIUM 3.38 G: 375; 3 INJECTION, SOLUTION INTRAVENOUS at 20:50

## 2021-06-06 RX ADMIN — DOCUSATE SODIUM 50MG AND SENNOSIDES 8.6MG 2 TABLET: 8.6; 5 TABLET, FILM COATED ORAL at 08:51

## 2021-06-06 RX ADMIN — Medication 250 MG: at 20:46

## 2021-06-06 RX ADMIN — HEPARIN SODIUM 5000 UNITS: 5000 INJECTION INTRAVENOUS; SUBCUTANEOUS at 20:46

## 2021-06-06 RX ADMIN — TAZOBACTAM SODIUM AND PIPERACILLIN SODIUM 3.38 G: 375; 3 INJECTION, SOLUTION INTRAVENOUS at 05:11

## 2021-06-06 RX ADMIN — SODIUM CHLORIDE, PRESERVATIVE FREE 10 ML: 5 INJECTION INTRAVENOUS at 08:51

## 2021-06-06 RX ADMIN — DOXYCYCLINE 100 MG: 100 INJECTION, POWDER, LYOPHILIZED, FOR SOLUTION INTRAVENOUS at 00:49

## 2021-06-06 RX ADMIN — HYDROXYZINE HYDROCHLORIDE 25 MG: 25 TABLET, FILM COATED ORAL at 09:01

## 2021-06-07 ENCOUNTER — APPOINTMENT (OUTPATIENT)
Dept: GENERAL RADIOLOGY | Facility: HOSPITAL | Age: 81
End: 2021-06-07

## 2021-06-07 LAB
ALBUMIN SERPL-MCNC: 2.8 G/DL (ref 3.5–5.2)
ANION GAP SERPL CALCULATED.3IONS-SCNC: 9 MMOL/L (ref 5–15)
BH CV ECHO MEAS - AO MAX PG: 5.5 MMHG
BH CV ECHO MEAS - AO MEAN PG: 3.5 MMHG
BH CV ECHO MEAS - AO ROOT AREA (BSA CORRECTED): 1.6
BH CV ECHO MEAS - AO ROOT AREA: 6.8 CM^2
BH CV ECHO MEAS - AO ROOT DIAM: 2.9 CM
BH CV ECHO MEAS - AO V2 MAX: 117.3 CM/SEC
BH CV ECHO MEAS - AO V2 MEAN: 88.3 CM/SEC
BH CV ECHO MEAS - AO V2 VTI: 26.4 CM
BH CV ECHO MEAS - BSA(HAYCOCK): 1.9 M^2
BH CV ECHO MEAS - BSA: 1.9 M^2
BH CV ECHO MEAS - BZI_BMI: 30.9 KILOGRAMS/M^2
BH CV ECHO MEAS - BZI_METRIC_HEIGHT: 162.6 CM
BH CV ECHO MEAS - BZI_METRIC_WEIGHT: 81.6 KG
BH CV ECHO MEAS - EDV(CUBED): 104.3 ML
BH CV ECHO MEAS - EDV(MOD-SP2): 113 ML
BH CV ECHO MEAS - EDV(MOD-SP4): 110 ML
BH CV ECHO MEAS - EDV(TEICH): 102.7 ML
BH CV ECHO MEAS - EF(CUBED): 47.8 %
BH CV ECHO MEAS - EF(MOD-BP): 43 %
BH CV ECHO MEAS - EF(MOD-SP2): 46 %
BH CV ECHO MEAS - EF(MOD-SP4): 42.7 %
BH CV ECHO MEAS - EF(TEICH): 40.1 %
BH CV ECHO MEAS - ESV(CUBED): 54.4 ML
BH CV ECHO MEAS - ESV(MOD-SP2): 61 ML
BH CV ECHO MEAS - ESV(MOD-SP4): 63 ML
BH CV ECHO MEAS - ESV(TEICH): 61.5 ML
BH CV ECHO MEAS - FS: 19.5 %
BH CV ECHO MEAS - IVS/LVPW: 0.96
BH CV ECHO MEAS - IVSD: 1 CM
BH CV ECHO MEAS - LA DIMENSION: 3.4 CM
BH CV ECHO MEAS - LA/AO: 1.2
BH CV ECHO MEAS - LAD MAJOR: 5.9 CM
BH CV ECHO MEAS - LAT PEAK E' VEL: 6.6 CM/SEC
BH CV ECHO MEAS - LATERAL E/E' RATIO: 19.6
BH CV ECHO MEAS - LV DIASTOLIC VOL/BSA (35-75): 58.8 ML/M^2
BH CV ECHO MEAS - LV IVRT: 0.06 SEC
BH CV ECHO MEAS - LV MASS(C)D: 171 GRAMS
BH CV ECHO MEAS - LV MASS(C)DI: 91.4 GRAMS/M^2
BH CV ECHO MEAS - LV SYSTOLIC VOL/BSA (12-30): 33.7 ML/M^2
BH CV ECHO MEAS - LVIDD: 4.7 CM
BH CV ECHO MEAS - LVIDS: 3.8 CM
BH CV ECHO MEAS - LVLD AP2: 7.7 CM
BH CV ECHO MEAS - LVLD AP4: 7.7 CM
BH CV ECHO MEAS - LVLS AP2: 7.2 CM
BH CV ECHO MEAS - LVLS AP4: 7.4 CM
BH CV ECHO MEAS - LVPWD: 1 CM
BH CV ECHO MEAS - MED PEAK E' VEL: 7.2 CM/SEC
BH CV ECHO MEAS - MEDIAL E/E' RATIO: 17.8
BH CV ECHO MEAS - MR MAX PG: 72 MMHG
BH CV ECHO MEAS - MR MAX VEL: 422.9 CM/SEC
BH CV ECHO MEAS - MR MEAN PG: 54.5 MMHG
BH CV ECHO MEAS - MR MEAN VEL: 351.1 CM/SEC
BH CV ECHO MEAS - MR VTI: 132.3 CM
BH CV ECHO MEAS - MV A MAX VEL: 90.7 CM/SEC
BH CV ECHO MEAS - MV DEC SLOPE: 297.7 CM/SEC^2
BH CV ECHO MEAS - MV DEC TIME: 0.11 SEC
BH CV ECHO MEAS - MV E MAX VEL: 131.4 CM/SEC
BH CV ECHO MEAS - MV E/A: 1.4
BH CV ECHO MEAS - MV MAX PG: 6.8 MMHG
BH CV ECHO MEAS - MV MEAN PG: 3.3 MMHG
BH CV ECHO MEAS - MV P1/2T MAX VEL: 116.6 CM/SEC
BH CV ECHO MEAS - MV P1/2T: 114.7 MSEC
BH CV ECHO MEAS - MV V2 MAX: 130.3 CM/SEC
BH CV ECHO MEAS - MV V2 MEAN: 86.1 CM/SEC
BH CV ECHO MEAS - MV V2 VTI: 35.5 CM
BH CV ECHO MEAS - MVA P1/2T LCG: 1.9 CM^2
BH CV ECHO MEAS - MVA(P1/2T): 1.9 CM^2
BH CV ECHO MEAS - PA ACC SLOPE: 438.1 CM/SEC^2
BH CV ECHO MEAS - PA ACC TIME: 0.14 SEC
BH CV ECHO MEAS - PA PR(ACCEL): 15.6 MMHG
BH CV ECHO MEAS - RAP SYSTOLE: 15 MMHG
BH CV ECHO MEAS - RVDD: 1.7 CM
BH CV ECHO MEAS - SI(AO): 96.2 ML/M^2
BH CV ECHO MEAS - SI(CUBED): 26.7 ML/M^2
BH CV ECHO MEAS - SI(MOD-SP2): 27.8 ML/M^2
BH CV ECHO MEAS - SI(MOD-SP4): 25.1 ML/M^2
BH CV ECHO MEAS - SI(TEICH): 22 ML/M^2
BH CV ECHO MEAS - SV(AO): 180 ML
BH CV ECHO MEAS - SV(CUBED): 49.9 ML
BH CV ECHO MEAS - SV(MOD-SP2): 52 ML
BH CV ECHO MEAS - SV(MOD-SP4): 47 ML
BH CV ECHO MEAS - SV(TEICH): 41.2 ML
BH CV ECHO MEAS - TAPSE (>1.6): 2.1 CM
BH CV ECHO MEASUREMENTS AVERAGE E/E' RATIO: 19.04
BH CV VAS BP RIGHT ARM: NORMAL MMHG
BH CV XLRA - RV BASE: 3.7 CM
BH CV XLRA - RV LENGTH: 8.7 CM
BH CV XLRA - RV MID: 2.1 CM
BUN SERPL-MCNC: 59 MG/DL (ref 8–23)
BUN/CREAT SERPL: 36.9 (ref 7–25)
CALCIUM SPEC-SCNC: 8.6 MG/DL (ref 8.6–10.5)
CHLORIDE SERPL-SCNC: 98 MMOL/L (ref 98–107)
CO2 SERPL-SCNC: 30 MMOL/L (ref 22–29)
CREAT SERPL-MCNC: 1.6 MG/DL (ref 0.57–1)
DEPRECATED RDW RBC AUTO: 47.5 FL (ref 37–54)
ERYTHROCYTE [DISTWIDTH] IN BLOOD BY AUTOMATED COUNT: 14 % (ref 12.3–15.4)
GFR SERPL CREATININE-BSD FRML MDRD: 31 ML/MIN/1.73
GLUCOSE BLDC GLUCOMTR-MCNC: 141 MG/DL (ref 70–130)
GLUCOSE BLDC GLUCOMTR-MCNC: 163 MG/DL (ref 70–130)
GLUCOSE BLDC GLUCOMTR-MCNC: 200 MG/DL (ref 70–130)
GLUCOSE BLDC GLUCOMTR-MCNC: 252 MG/DL (ref 70–130)
GLUCOSE SERPL-MCNC: 150 MG/DL (ref 65–99)
HCT VFR BLD AUTO: 32.8 % (ref 34–46.6)
HGB BLD-MCNC: 10.4 G/DL (ref 12–15.9)
LEFT ATRIUM VOLUME INDEX: 26.7 ML/M2
LEFT ATRIUM VOLUME: 50 CM3
MCH RBC QN AUTO: 30 PG (ref 26.6–33)
MCHC RBC AUTO-ENTMCNC: 31.7 G/DL (ref 31.5–35.7)
MCV RBC AUTO: 94.5 FL (ref 79–97)
PHOSPHATE SERPL-MCNC: 4.6 MG/DL (ref 2.5–4.5)
PLATELET # BLD AUTO: 261 10*3/MM3 (ref 140–450)
PMV BLD AUTO: 11.1 FL (ref 6–12)
POTASSIUM SERPL-SCNC: 3.9 MMOL/L (ref 3.5–5.2)
QT INTERVAL: 350 MS
QTC INTERVAL: 437 MS
RBC # BLD AUTO: 3.47 10*6/MM3 (ref 3.77–5.28)
SODIUM SERPL-SCNC: 137 MMOL/L (ref 136–145)
WBC # BLD AUTO: 9.37 10*3/MM3 (ref 3.4–10.8)

## 2021-06-07 PROCEDURE — 63710000001 INSULIN LISPRO (HUMAN) PER 5 UNITS: Performed by: INTERNAL MEDICINE

## 2021-06-07 PROCEDURE — 25010000002 PIPERACILLIN SOD-TAZOBACTAM PER 1 G: Performed by: NURSE PRACTITIONER

## 2021-06-07 PROCEDURE — 80069 RENAL FUNCTION PANEL: CPT | Performed by: INTERNAL MEDICINE

## 2021-06-07 PROCEDURE — 94660 CPAP INITIATION&MGMT: CPT

## 2021-06-07 PROCEDURE — 99024 POSTOP FOLLOW-UP VISIT: CPT | Performed by: THORACIC SURGERY (CARDIOTHORACIC VASCULAR SURGERY)

## 2021-06-07 PROCEDURE — 85027 COMPLETE CBC AUTOMATED: CPT | Performed by: INTERNAL MEDICINE

## 2021-06-07 PROCEDURE — 94799 UNLISTED PULMONARY SVC/PX: CPT

## 2021-06-07 PROCEDURE — 25010000002 HEPARIN (PORCINE) PER 1000 UNITS: Performed by: INTERNAL MEDICINE

## 2021-06-07 PROCEDURE — 25010000002 FUROSEMIDE PER 20 MG: Performed by: INTERNAL MEDICINE

## 2021-06-07 PROCEDURE — 99233 SBSQ HOSP IP/OBS HIGH 50: CPT | Performed by: INTERNAL MEDICINE

## 2021-06-07 PROCEDURE — 82962 GLUCOSE BLOOD TEST: CPT

## 2021-06-07 PROCEDURE — 99232 SBSQ HOSP IP/OBS MODERATE 35: CPT | Performed by: INTERNAL MEDICINE

## 2021-06-07 PROCEDURE — 63710000001 INSULIN DETEMIR PER 5 UNITS: Performed by: INTERNAL MEDICINE

## 2021-06-07 PROCEDURE — 71045 X-RAY EXAM CHEST 1 VIEW: CPT

## 2021-06-07 RX ORDER — FUROSEMIDE 10 MG/ML
80 INJECTION INTRAMUSCULAR; INTRAVENOUS ONCE
Status: COMPLETED | OUTPATIENT
Start: 2021-06-07 | End: 2021-06-07

## 2021-06-07 RX ORDER — ATORVASTATIN CALCIUM 40 MG/1
80 TABLET, FILM COATED ORAL NIGHTLY
Status: DISCONTINUED | OUTPATIENT
Start: 2021-06-07 | End: 2021-06-26 | Stop reason: HOSPADM

## 2021-06-07 RX ADMIN — INSULIN LISPRO 3 UNITS: 100 INJECTION, SOLUTION INTRAVENOUS; SUBCUTANEOUS at 18:07

## 2021-06-07 RX ADMIN — INSULIN LISPRO 3 UNITS: 100 INJECTION, SOLUTION INTRAVENOUS; SUBCUTANEOUS at 18:08

## 2021-06-07 RX ADMIN — TAZOBACTAM SODIUM AND PIPERACILLIN SODIUM 3.38 G: 375; 3 INJECTION, SOLUTION INTRAVENOUS at 12:08

## 2021-06-07 RX ADMIN — Medication 250 MG: at 21:35

## 2021-06-07 RX ADMIN — INSULIN DETEMIR 10 UNITS: 100 INJECTION, SOLUTION SUBCUTANEOUS at 08:15

## 2021-06-07 RX ADMIN — POLYETHYLENE GLYCOL 3350 17 G: 17 POWDER, FOR SOLUTION ORAL at 08:17

## 2021-06-07 RX ADMIN — IPRATROPIUM BROMIDE AND ALBUTEROL SULFATE 3 ML: 2.5; .5 SOLUTION RESPIRATORY (INHALATION) at 09:19

## 2021-06-07 RX ADMIN — HYDROXYZINE HYDROCHLORIDE 25 MG: 25 TABLET, FILM COATED ORAL at 08:16

## 2021-06-07 RX ADMIN — HEPARIN SODIUM 5000 UNITS: 5000 INJECTION INTRAVENOUS; SUBCUTANEOUS at 21:34

## 2021-06-07 RX ADMIN — INSULIN LISPRO 3 UNITS: 100 INJECTION, SOLUTION INTRAVENOUS; SUBCUTANEOUS at 12:09

## 2021-06-07 RX ADMIN — INSULIN LISPRO 2 UNITS: 100 INJECTION, SOLUTION INTRAVENOUS; SUBCUTANEOUS at 12:08

## 2021-06-07 RX ADMIN — CARVEDILOL 6.25 MG: 6.25 TABLET, FILM COATED ORAL at 08:16

## 2021-06-07 RX ADMIN — SODIUM CHLORIDE, PRESERVATIVE FREE 10 ML: 5 INJECTION INTRAVENOUS at 08:17

## 2021-06-07 RX ADMIN — TAZOBACTAM SODIUM AND PIPERACILLIN SODIUM 3.38 G: 375; 3 INJECTION, SOLUTION INTRAVENOUS at 21:35

## 2021-06-07 RX ADMIN — IPRATROPIUM BROMIDE AND ALBUTEROL SULFATE 3 ML: 2.5; .5 SOLUTION RESPIRATORY (INHALATION) at 13:43

## 2021-06-07 RX ADMIN — IPRATROPIUM BROMIDE AND ALBUTEROL SULFATE 3 ML: 2.5; .5 SOLUTION RESPIRATORY (INHALATION) at 16:05

## 2021-06-07 RX ADMIN — ASPIRIN 81 MG: 81 TABLET, COATED ORAL at 08:16

## 2021-06-07 RX ADMIN — IPRATROPIUM BROMIDE AND ALBUTEROL SULFATE 3 ML: 2.5; .5 SOLUTION RESPIRATORY (INHALATION) at 19:31

## 2021-06-07 RX ADMIN — Medication 250 MG: at 08:16

## 2021-06-07 RX ADMIN — HEPARIN SODIUM 5000 UNITS: 5000 INJECTION INTRAVENOUS; SUBCUTANEOUS at 05:17

## 2021-06-07 RX ADMIN — ALLOPURINOL 100 MG: 100 TABLET ORAL at 08:16

## 2021-06-07 RX ADMIN — SODIUM CHLORIDE, PRESERVATIVE FREE 10 ML: 5 INJECTION INTRAVENOUS at 21:36

## 2021-06-07 RX ADMIN — INSULIN DETEMIR 10 UNITS: 100 INJECTION, SOLUTION SUBCUTANEOUS at 21:35

## 2021-06-07 RX ADMIN — DOCUSATE SODIUM 50MG AND SENNOSIDES 8.6MG 2 TABLET: 8.6; 5 TABLET, FILM COATED ORAL at 21:35

## 2021-06-07 RX ADMIN — DOCUSATE SODIUM 50MG AND SENNOSIDES 8.6MG 2 TABLET: 8.6; 5 TABLET, FILM COATED ORAL at 08:16

## 2021-06-07 RX ADMIN — INSULIN LISPRO 3 UNITS: 100 INJECTION, SOLUTION INTRAVENOUS; SUBCUTANEOUS at 08:16

## 2021-06-07 RX ADMIN — CARVEDILOL 6.25 MG: 6.25 TABLET, FILM COATED ORAL at 18:07

## 2021-06-07 RX ADMIN — ATORVASTATIN CALCIUM 80 MG: 40 TABLET, FILM COATED ORAL at 21:35

## 2021-06-07 RX ADMIN — PREGABALIN 100 MG: 100 CAPSULE ORAL at 08:17

## 2021-06-07 RX ADMIN — HYDROXYZINE HYDROCHLORIDE 25 MG: 25 TABLET, FILM COATED ORAL at 21:35

## 2021-06-07 RX ADMIN — TAZOBACTAM SODIUM AND PIPERACILLIN SODIUM 3.38 G: 375; 3 INJECTION, SOLUTION INTRAVENOUS at 05:17

## 2021-06-07 RX ADMIN — FUROSEMIDE 80 MG: 10 INJECTION, SOLUTION INTRAMUSCULAR; INTRAVENOUS at 18:07

## 2021-06-07 RX ADMIN — HEPARIN SODIUM 5000 UNITS: 5000 INJECTION INTRAVENOUS; SUBCUTANEOUS at 14:18

## 2021-06-08 ENCOUNTER — APPOINTMENT (OUTPATIENT)
Dept: GENERAL RADIOLOGY | Facility: HOSPITAL | Age: 81
End: 2021-06-08

## 2021-06-08 ENCOUNTER — APPOINTMENT (OUTPATIENT)
Dept: CT IMAGING | Facility: HOSPITAL | Age: 81
End: 2021-06-08

## 2021-06-08 LAB
ANION GAP SERPL CALCULATED.3IONS-SCNC: 11 MMOL/L (ref 5–15)
APPEARANCE FLD: CLEAR
BUN SERPL-MCNC: 56 MG/DL (ref 8–23)
BUN/CREAT SERPL: 37.6 (ref 7–25)
CALCIUM SPEC-SCNC: 8.6 MG/DL (ref 8.6–10.5)
CHLORIDE SERPL-SCNC: 98 MMOL/L (ref 98–107)
CO2 SERPL-SCNC: 28 MMOL/L (ref 22–29)
COLOR FLD: YELLOW
CREAT SERPL-MCNC: 1.49 MG/DL (ref 0.57–1)
GFR SERPL CREATININE-BSD FRML MDRD: 34 ML/MIN/1.73
GLUCOSE BLDC GLUCOMTR-MCNC: 232 MG/DL (ref 70–130)
GLUCOSE BLDC GLUCOMTR-MCNC: 330 MG/DL (ref 70–130)
GLUCOSE BLDC GLUCOMTR-MCNC: 338 MG/DL (ref 70–130)
GLUCOSE BLDC GLUCOMTR-MCNC: 380 MG/DL (ref 70–130)
GLUCOSE SERPL-MCNC: 228 MG/DL (ref 65–99)
INR PPP: 1.15 (ref 0.85–1.16)
LDH FLD-CCNC: 93 U/L
LDH SERPL-CCNC: 338 U/L (ref 135–214)
LYMPHOCYTES NFR FLD MANUAL: 20 %
MACROPHAGE FLUID: 51 %
NEUTROPHILS NFR FLD MANUAL: 29 %
POTASSIUM SERPL-SCNC: 3.3 MMOL/L (ref 3.5–5.2)
PROT FLD-MCNC: 1.2 G/DL
PROTHROMBIN TIME: 14.4 SECONDS (ref 11.4–14.4)
RBC # FLD AUTO: <2000 /MM3
SODIUM SERPL-SCNC: 137 MMOL/L (ref 136–145)
WBC # FLD AUTO: 140 /MM3

## 2021-06-08 PROCEDURE — 94799 UNLISTED PULMONARY SVC/PX: CPT

## 2021-06-08 PROCEDURE — 63710000001 INSULIN LISPRO (HUMAN) PER 5 UNITS: Performed by: INTERNAL MEDICINE

## 2021-06-08 PROCEDURE — 87070 CULTURE OTHR SPECIMN AEROBIC: CPT | Performed by: INTERNAL MEDICINE

## 2021-06-08 PROCEDURE — 83615 LACTATE (LD) (LDH) ENZYME: CPT | Performed by: INTERNAL MEDICINE

## 2021-06-08 PROCEDURE — 25010000002 FUROSEMIDE PER 20 MG: Performed by: INTERNAL MEDICINE

## 2021-06-08 PROCEDURE — 87147 CULTURE TYPE IMMUNOLOGIC: CPT | Performed by: INTERNAL MEDICINE

## 2021-06-08 PROCEDURE — 87205 SMEAR GRAM STAIN: CPT | Performed by: INTERNAL MEDICINE

## 2021-06-08 PROCEDURE — 63710000001 INSULIN DETEMIR PER 5 UNITS: Performed by: INTERNAL MEDICINE

## 2021-06-08 PROCEDURE — 87075 CULTR BACTERIA EXCEPT BLOOD: CPT | Performed by: INTERNAL MEDICINE

## 2021-06-08 PROCEDURE — 82962 GLUCOSE BLOOD TEST: CPT

## 2021-06-08 PROCEDURE — 25010000002 HEPARIN (PORCINE) PER 1000 UNITS: Performed by: INTERNAL MEDICINE

## 2021-06-08 PROCEDURE — 87015 SPECIMEN INFECT AGNT CONCNTJ: CPT | Performed by: INTERNAL MEDICINE

## 2021-06-08 PROCEDURE — 0W993ZZ DRAINAGE OF RIGHT PLEURAL CAVITY, PERCUTANEOUS APPROACH: ICD-10-PCS | Performed by: RADIOLOGY

## 2021-06-08 PROCEDURE — 99291 CRITICAL CARE FIRST HOUR: CPT | Performed by: INTERNAL MEDICINE

## 2021-06-08 PROCEDURE — 75989 ABSCESS DRAINAGE UNDER X-RAY: CPT

## 2021-06-08 PROCEDURE — 88112 CYTOPATH CELL ENHANCE TECH: CPT | Performed by: INTERNAL MEDICINE

## 2021-06-08 PROCEDURE — 32555 ASPIRATE PLEURA W/ IMAGING: CPT | Performed by: RADIOLOGY

## 2021-06-08 PROCEDURE — 25010000002 PIPERACILLIN SOD-TAZOBACTAM PER 1 G: Performed by: NURSE PRACTITIONER

## 2021-06-08 PROCEDURE — C1729 CATH, DRAINAGE: HCPCS

## 2021-06-08 PROCEDURE — 85610 PROTHROMBIN TIME: CPT | Performed by: RADIOLOGY

## 2021-06-08 PROCEDURE — 99232 SBSQ HOSP IP/OBS MODERATE 35: CPT | Performed by: NURSE PRACTITIONER

## 2021-06-08 PROCEDURE — 99024 POSTOP FOLLOW-UP VISIT: CPT | Performed by: THORACIC SURGERY (CARDIOTHORACIC VASCULAR SURGERY)

## 2021-06-08 PROCEDURE — 84157 ASSAY OF PROTEIN OTHER: CPT | Performed by: INTERNAL MEDICINE

## 2021-06-08 PROCEDURE — 89051 BODY FLUID CELL COUNT: CPT | Performed by: INTERNAL MEDICINE

## 2021-06-08 PROCEDURE — 71045 X-RAY EXAM CHEST 1 VIEW: CPT

## 2021-06-08 PROCEDURE — 80048 BASIC METABOLIC PNL TOTAL CA: CPT | Performed by: INTERNAL MEDICINE

## 2021-06-08 PROCEDURE — 87186 SC STD MICRODIL/AGAR DIL: CPT | Performed by: INTERNAL MEDICINE

## 2021-06-08 RX ORDER — POTASSIUM CHLORIDE 750 MG/1
60 CAPSULE, EXTENDED RELEASE ORAL DAILY
Status: DISCONTINUED | OUTPATIENT
Start: 2021-06-08 | End: 2021-06-21

## 2021-06-08 RX ORDER — POTASSIUM CHLORIDE 7.45 MG/ML
10 INJECTION INTRAVENOUS
Status: DISCONTINUED | OUTPATIENT
Start: 2021-06-08 | End: 2021-06-26 | Stop reason: HOSPADM

## 2021-06-08 RX ORDER — FUROSEMIDE 10 MG/ML
80 INJECTION INTRAMUSCULAR; INTRAVENOUS ONCE
Status: COMPLETED | OUTPATIENT
Start: 2021-06-08 | End: 2021-06-08

## 2021-06-08 RX ORDER — POTASSIUM CHLORIDE 1.5 G/1.77G
40 POWDER, FOR SOLUTION ORAL AS NEEDED
Status: DISCONTINUED | OUTPATIENT
Start: 2021-06-08 | End: 2021-06-26 | Stop reason: HOSPADM

## 2021-06-08 RX ORDER — POTASSIUM CHLORIDE 750 MG/1
40 CAPSULE, EXTENDED RELEASE ORAL AS NEEDED
Status: DISCONTINUED | OUTPATIENT
Start: 2021-06-08 | End: 2021-06-26 | Stop reason: HOSPADM

## 2021-06-08 RX ADMIN — INSULIN DETEMIR 10 UNITS: 100 INJECTION, SOLUTION SUBCUTANEOUS at 08:08

## 2021-06-08 RX ADMIN — DOCUSATE SODIUM 50MG AND SENNOSIDES 8.6MG 2 TABLET: 8.6; 5 TABLET, FILM COATED ORAL at 20:01

## 2021-06-08 RX ADMIN — Medication 250 MG: at 20:01

## 2021-06-08 RX ADMIN — TAZOBACTAM SODIUM AND PIPERACILLIN SODIUM 3.38 G: 375; 3 INJECTION, SOLUTION INTRAVENOUS at 05:04

## 2021-06-08 RX ADMIN — HYDROXYZINE HYDROCHLORIDE 25 MG: 25 TABLET, FILM COATED ORAL at 20:01

## 2021-06-08 RX ADMIN — ATORVASTATIN CALCIUM 80 MG: 40 TABLET, FILM COATED ORAL at 20:01

## 2021-06-08 RX ADMIN — INSULIN DETEMIR 12 UNITS: 100 INJECTION, SOLUTION SUBCUTANEOUS at 22:21

## 2021-06-08 RX ADMIN — INSULIN LISPRO 3 UNITS: 100 INJECTION, SOLUTION INTRAVENOUS; SUBCUTANEOUS at 08:09

## 2021-06-08 RX ADMIN — ALLOPURINOL 100 MG: 100 TABLET ORAL at 08:07

## 2021-06-08 RX ADMIN — PREGABALIN 100 MG: 100 CAPSULE ORAL at 08:10

## 2021-06-08 RX ADMIN — HYDROXYZINE HYDROCHLORIDE 25 MG: 25 TABLET, FILM COATED ORAL at 10:40

## 2021-06-08 RX ADMIN — IPRATROPIUM BROMIDE AND ALBUTEROL SULFATE 3 ML: 2.5; .5 SOLUTION RESPIRATORY (INHALATION) at 19:21

## 2021-06-08 RX ADMIN — ASPIRIN 81 MG: 81 TABLET, COATED ORAL at 08:08

## 2021-06-08 RX ADMIN — CARVEDILOL 6.25 MG: 6.25 TABLET, FILM COATED ORAL at 08:08

## 2021-06-08 RX ADMIN — SODIUM CHLORIDE, PRESERVATIVE FREE 10 ML: 5 INJECTION INTRAVENOUS at 08:11

## 2021-06-08 RX ADMIN — CARVEDILOL 6.25 MG: 6.25 TABLET, FILM COATED ORAL at 17:38

## 2021-06-08 RX ADMIN — Medication 250 MG: at 08:08

## 2021-06-08 RX ADMIN — SODIUM CHLORIDE, PRESERVATIVE FREE 10 ML: 5 INJECTION INTRAVENOUS at 20:01

## 2021-06-08 RX ADMIN — IPRATROPIUM BROMIDE AND ALBUTEROL SULFATE 3 ML: 2.5; .5 SOLUTION RESPIRATORY (INHALATION) at 16:24

## 2021-06-08 RX ADMIN — TAZOBACTAM SODIUM AND PIPERACILLIN SODIUM 3.38 G: 375; 3 INJECTION, SOLUTION INTRAVENOUS at 20:01

## 2021-06-08 RX ADMIN — TAZOBACTAM SODIUM AND PIPERACILLIN SODIUM 3.38 G: 375; 3 INJECTION, SOLUTION INTRAVENOUS at 11:44

## 2021-06-08 RX ADMIN — POTASSIUM CHLORIDE 60 MEQ: 750 CAPSULE, EXTENDED RELEASE ORAL at 08:07

## 2021-06-08 RX ADMIN — INSULIN LISPRO 5 UNITS: 100 INJECTION, SOLUTION INTRAVENOUS; SUBCUTANEOUS at 17:38

## 2021-06-08 RX ADMIN — INSULIN LISPRO 5 UNITS: 100 INJECTION, SOLUTION INTRAVENOUS; SUBCUTANEOUS at 11:45

## 2021-06-08 RX ADMIN — INSULIN LISPRO 6 UNITS: 100 INJECTION, SOLUTION INTRAVENOUS; SUBCUTANEOUS at 11:45

## 2021-06-08 RX ADMIN — FUROSEMIDE 80 MG: 10 INJECTION INTRAMUSCULAR; INTRAVENOUS at 10:40

## 2021-06-08 RX ADMIN — IPRATROPIUM BROMIDE AND ALBUTEROL SULFATE 3 ML: 2.5; .5 SOLUTION RESPIRATORY (INHALATION) at 08:28

## 2021-06-08 RX ADMIN — POTASSIUM CHLORIDE 40 MEQ: 750 CAPSULE, EXTENDED RELEASE ORAL at 13:28

## 2021-06-08 RX ADMIN — DOCUSATE SODIUM 50MG AND SENNOSIDES 8.6MG 2 TABLET: 8.6; 5 TABLET, FILM COATED ORAL at 08:08

## 2021-06-08 RX ADMIN — HEPARIN SODIUM 5000 UNITS: 5000 INJECTION INTRAVENOUS; SUBCUTANEOUS at 07:01

## 2021-06-09 LAB
ANION GAP SERPL CALCULATED.3IONS-SCNC: 12 MMOL/L (ref 5–15)
BACTERIA SPEC AEROBE CULT: NORMAL
BACTERIA SPEC AEROBE CULT: NORMAL
BUN SERPL-MCNC: 51 MG/DL (ref 8–23)
BUN/CREAT SERPL: 34 (ref 7–25)
CALCIUM SPEC-SCNC: 8.6 MG/DL (ref 8.6–10.5)
CHLORIDE SERPL-SCNC: 98 MMOL/L (ref 98–107)
CO2 SERPL-SCNC: 23 MMOL/L (ref 22–29)
CREAT SERPL-MCNC: 1.5 MG/DL (ref 0.57–1)
DEPRECATED RDW RBC AUTO: 52 FL (ref 37–54)
ERYTHROCYTE [DISTWIDTH] IN BLOOD BY AUTOMATED COUNT: 14.4 % (ref 12.3–15.4)
GFR SERPL CREATININE-BSD FRML MDRD: 33 ML/MIN/1.73
GLUCOSE BLDC GLUCOMTR-MCNC: 227 MG/DL (ref 70–130)
GLUCOSE BLDC GLUCOMTR-MCNC: 240 MG/DL (ref 70–130)
GLUCOSE BLDC GLUCOMTR-MCNC: 246 MG/DL (ref 70–130)
GLUCOSE BLDC GLUCOMTR-MCNC: 248 MG/DL (ref 70–130)
GLUCOSE SERPL-MCNC: 196 MG/DL (ref 65–99)
HCT VFR BLD AUTO: 30.6 % (ref 34–46.6)
HGB BLD-MCNC: 9.1 G/DL (ref 12–15.9)
MCH RBC QN AUTO: 30.1 PG (ref 26.6–33)
MCHC RBC AUTO-ENTMCNC: 29.7 G/DL (ref 31.5–35.7)
MCV RBC AUTO: 101.3 FL (ref 79–97)
PLATELET # BLD AUTO: 206 10*3/MM3 (ref 140–450)
PMV BLD AUTO: 11.7 FL (ref 6–12)
POTASSIUM SERPL-SCNC: 4.2 MMOL/L (ref 3.5–5.2)
RBC # BLD AUTO: 3.02 10*6/MM3 (ref 3.77–5.28)
SODIUM SERPL-SCNC: 133 MMOL/L (ref 136–145)
WBC # BLD AUTO: 8.51 10*3/MM3 (ref 3.4–10.8)

## 2021-06-09 PROCEDURE — 25010000002 FUROSEMIDE PER 20 MG: Performed by: INTERNAL MEDICINE

## 2021-06-09 PROCEDURE — 99291 CRITICAL CARE FIRST HOUR: CPT | Performed by: INTERNAL MEDICINE

## 2021-06-09 PROCEDURE — 85027 COMPLETE CBC AUTOMATED: CPT | Performed by: INTERNAL MEDICINE

## 2021-06-09 PROCEDURE — 97530 THERAPEUTIC ACTIVITIES: CPT

## 2021-06-09 PROCEDURE — 97597 DBRDMT OPN WND 1ST 20 CM/<: CPT

## 2021-06-09 PROCEDURE — 94799 UNLISTED PULMONARY SVC/PX: CPT

## 2021-06-09 PROCEDURE — 97164 PT RE-EVAL EST PLAN CARE: CPT

## 2021-06-09 PROCEDURE — 97110 THERAPEUTIC EXERCISES: CPT

## 2021-06-09 PROCEDURE — 94660 CPAP INITIATION&MGMT: CPT

## 2021-06-09 PROCEDURE — 63710000001 INSULIN DETEMIR PER 5 UNITS: Performed by: INTERNAL MEDICINE

## 2021-06-09 PROCEDURE — 99232 SBSQ HOSP IP/OBS MODERATE 35: CPT | Performed by: INTERNAL MEDICINE

## 2021-06-09 PROCEDURE — 97605 NEG PRS WND THER DME<=50SQCM: CPT

## 2021-06-09 PROCEDURE — 63710000001 INSULIN LISPRO (HUMAN) PER 5 UNITS: Performed by: INTERNAL MEDICINE

## 2021-06-09 PROCEDURE — 99024 POSTOP FOLLOW-UP VISIT: CPT | Performed by: THORACIC SURGERY (CARDIOTHORACIC VASCULAR SURGERY)

## 2021-06-09 PROCEDURE — 80048 BASIC METABOLIC PNL TOTAL CA: CPT | Performed by: INTERNAL MEDICINE

## 2021-06-09 PROCEDURE — 25010000002 PIPERACILLIN SOD-TAZOBACTAM PER 1 G: Performed by: NURSE PRACTITIONER

## 2021-06-09 PROCEDURE — 82962 GLUCOSE BLOOD TEST: CPT

## 2021-06-09 RX ORDER — FUROSEMIDE 10 MG/ML
40 INJECTION INTRAMUSCULAR; INTRAVENOUS EVERY 12 HOURS SCHEDULED
Status: DISCONTINUED | OUTPATIENT
Start: 2021-06-09 | End: 2021-06-16

## 2021-06-09 RX ORDER — HYDRALAZINE HYDROCHLORIDE 10 MG/1
10 TABLET, FILM COATED ORAL EVERY 8 HOURS SCHEDULED
Status: DISCONTINUED | OUTPATIENT
Start: 2021-06-09 | End: 2021-06-10

## 2021-06-09 RX ADMIN — HYDRALAZINE HYDROCHLORIDE 10 MG: 10 TABLET ORAL at 13:59

## 2021-06-09 RX ADMIN — TAZOBACTAM SODIUM AND PIPERACILLIN SODIUM 3.38 G: 375; 3 INJECTION, SOLUTION INTRAVENOUS at 13:59

## 2021-06-09 RX ADMIN — INSULIN LISPRO 3 UNITS: 100 INJECTION, SOLUTION INTRAVENOUS; SUBCUTANEOUS at 09:30

## 2021-06-09 RX ADMIN — TRAMADOL HYDROCHLORIDE 50 MG: 50 TABLET, FILM COATED ORAL at 09:34

## 2021-06-09 RX ADMIN — DOCUSATE SODIUM 50MG AND SENNOSIDES 8.6MG 2 TABLET: 8.6; 5 TABLET, FILM COATED ORAL at 21:50

## 2021-06-09 RX ADMIN — TAZOBACTAM SODIUM AND PIPERACILLIN SODIUM 3.38 G: 375; 3 INJECTION, SOLUTION INTRAVENOUS at 19:42

## 2021-06-09 RX ADMIN — CARVEDILOL 6.25 MG: 6.25 TABLET, FILM COATED ORAL at 17:51

## 2021-06-09 RX ADMIN — INSULIN DETEMIR 12 UNITS: 100 INJECTION, SOLUTION SUBCUTANEOUS at 09:31

## 2021-06-09 RX ADMIN — FUROSEMIDE 40 MG: 10 INJECTION, SOLUTION INTRAMUSCULAR; INTRAVENOUS at 09:32

## 2021-06-09 RX ADMIN — DOCUSATE SODIUM 50MG AND SENNOSIDES 8.6MG 2 TABLET: 8.6; 5 TABLET, FILM COATED ORAL at 09:32

## 2021-06-09 RX ADMIN — PREGABALIN 100 MG: 100 CAPSULE ORAL at 09:32

## 2021-06-09 RX ADMIN — ASPIRIN 81 MG: 81 TABLET, COATED ORAL at 09:32

## 2021-06-09 RX ADMIN — HYDROXYZINE HYDROCHLORIDE 25 MG: 25 TABLET, FILM COATED ORAL at 21:51

## 2021-06-09 RX ADMIN — INSULIN LISPRO 3 UNITS: 100 INJECTION, SOLUTION INTRAVENOUS; SUBCUTANEOUS at 17:52

## 2021-06-09 RX ADMIN — Medication 250 MG: at 21:51

## 2021-06-09 RX ADMIN — HYDRALAZINE HYDROCHLORIDE 10 MG: 10 TABLET ORAL at 21:51

## 2021-06-09 RX ADMIN — IPRATROPIUM BROMIDE AND ALBUTEROL SULFATE 3 ML: 2.5; .5 SOLUTION RESPIRATORY (INHALATION) at 19:31

## 2021-06-09 RX ADMIN — ALLOPURINOL 100 MG: 100 TABLET ORAL at 09:31

## 2021-06-09 RX ADMIN — INSULIN LISPRO 5 UNITS: 100 INJECTION, SOLUTION INTRAVENOUS; SUBCUTANEOUS at 13:01

## 2021-06-09 RX ADMIN — Medication 250 MG: at 09:32

## 2021-06-09 RX ADMIN — IPRATROPIUM BROMIDE AND ALBUTEROL SULFATE 3 ML: 2.5; .5 SOLUTION RESPIRATORY (INHALATION) at 16:02

## 2021-06-09 RX ADMIN — INSULIN LISPRO 5 UNITS: 100 INJECTION, SOLUTION INTRAVENOUS; SUBCUTANEOUS at 17:51

## 2021-06-09 RX ADMIN — POTASSIUM CHLORIDE 60 MEQ: 750 CAPSULE, EXTENDED RELEASE ORAL at 09:31

## 2021-06-09 RX ADMIN — INSULIN DETEMIR 15 UNITS: 100 INJECTION, SOLUTION SUBCUTANEOUS at 21:52

## 2021-06-09 RX ADMIN — TAZOBACTAM SODIUM AND PIPERACILLIN SODIUM 3.38 G: 375; 3 INJECTION, SOLUTION INTRAVENOUS at 04:54

## 2021-06-09 RX ADMIN — INSULIN LISPRO 5 UNITS: 100 INJECTION, SOLUTION INTRAVENOUS; SUBCUTANEOUS at 09:29

## 2021-06-09 RX ADMIN — HYDRALAZINE HYDROCHLORIDE 10 MG: 10 TABLET ORAL at 09:31

## 2021-06-09 RX ADMIN — IPRATROPIUM BROMIDE AND ALBUTEROL SULFATE 3 ML: 2.5; .5 SOLUTION RESPIRATORY (INHALATION) at 13:22

## 2021-06-09 RX ADMIN — CARVEDILOL 6.25 MG: 6.25 TABLET, FILM COATED ORAL at 09:32

## 2021-06-09 RX ADMIN — INSULIN LISPRO 3 UNITS: 100 INJECTION, SOLUTION INTRAVENOUS; SUBCUTANEOUS at 13:02

## 2021-06-09 RX ADMIN — SODIUM CHLORIDE, PRESERVATIVE FREE 10 ML: 5 INJECTION INTRAVENOUS at 21:51

## 2021-06-09 RX ADMIN — TRAMADOL HYDROCHLORIDE 50 MG: 50 TABLET, FILM COATED ORAL at 01:41

## 2021-06-09 RX ADMIN — FUROSEMIDE 40 MG: 10 INJECTION, SOLUTION INTRAMUSCULAR; INTRAVENOUS at 17:51

## 2021-06-09 RX ADMIN — HYDROXYZINE HYDROCHLORIDE 25 MG: 25 TABLET, FILM COATED ORAL at 09:34

## 2021-06-09 RX ADMIN — ATORVASTATIN CALCIUM 80 MG: 40 TABLET, FILM COATED ORAL at 21:50

## 2021-06-09 RX ADMIN — IPRATROPIUM BROMIDE AND ALBUTEROL SULFATE 3 ML: 2.5; .5 SOLUTION RESPIRATORY (INHALATION) at 07:14

## 2021-06-10 ENCOUNTER — APPOINTMENT (OUTPATIENT)
Dept: GENERAL RADIOLOGY | Facility: HOSPITAL | Age: 81
End: 2021-06-10

## 2021-06-10 LAB
ANION GAP SERPL CALCULATED.3IONS-SCNC: 11 MMOL/L (ref 5–15)
BACTERIA SPEC AEROBE CULT: ABNORMAL
BUN SERPL-MCNC: 47 MG/DL (ref 8–23)
BUN/CREAT SERPL: 36.2 (ref 7–25)
CALCIUM SPEC-SCNC: 8.2 MG/DL (ref 8.6–10.5)
CHLORIDE SERPL-SCNC: 97 MMOL/L (ref 98–107)
CO2 SERPL-SCNC: 31 MMOL/L (ref 22–29)
CREAT SERPL-MCNC: 1.3 MG/DL (ref 0.57–1)
GFR SERPL CREATININE-BSD FRML MDRD: 39 ML/MIN/1.73
GLUCOSE BLDC GLUCOMTR-MCNC: 156 MG/DL (ref 70–130)
GLUCOSE BLDC GLUCOMTR-MCNC: 216 MG/DL (ref 70–130)
GLUCOSE BLDC GLUCOMTR-MCNC: 303 MG/DL (ref 70–130)
GLUCOSE BLDC GLUCOMTR-MCNC: 351 MG/DL (ref 70–130)
GLUCOSE SERPL-MCNC: 138 MG/DL (ref 65–99)
GRAM STN SPEC: ABNORMAL
GRAM STN SPEC: ABNORMAL
LAB AP CASE REPORT: NORMAL
NT-PROBNP SERPL-MCNC: ABNORMAL PG/ML (ref 0–1800)
PATH REPORT.FINAL DX SPEC: NORMAL
POTASSIUM SERPL-SCNC: 3.8 MMOL/L (ref 3.5–5.2)
SODIUM SERPL-SCNC: 139 MMOL/L (ref 136–145)

## 2021-06-10 PROCEDURE — 99291 CRITICAL CARE FIRST HOUR: CPT | Performed by: INTERNAL MEDICINE

## 2021-06-10 PROCEDURE — 25010000002 PIPERACILLIN SOD-TAZOBACTAM PER 1 G: Performed by: NURSE PRACTITIONER

## 2021-06-10 PROCEDURE — 94799 UNLISTED PULMONARY SVC/PX: CPT

## 2021-06-10 PROCEDURE — 63710000001 INSULIN DETEMIR PER 5 UNITS: Performed by: INTERNAL MEDICINE

## 2021-06-10 PROCEDURE — 94660 CPAP INITIATION&MGMT: CPT

## 2021-06-10 PROCEDURE — 83880 ASSAY OF NATRIURETIC PEPTIDE: CPT | Performed by: INTERNAL MEDICINE

## 2021-06-10 PROCEDURE — 80048 BASIC METABOLIC PNL TOTAL CA: CPT | Performed by: INTERNAL MEDICINE

## 2021-06-10 PROCEDURE — 25010000002 FUROSEMIDE PER 20 MG: Performed by: INTERNAL MEDICINE

## 2021-06-10 PROCEDURE — 63710000001 INSULIN LISPRO (HUMAN) PER 5 UNITS: Performed by: INTERNAL MEDICINE

## 2021-06-10 PROCEDURE — 97110 THERAPEUTIC EXERCISES: CPT

## 2021-06-10 PROCEDURE — 82962 GLUCOSE BLOOD TEST: CPT

## 2021-06-10 PROCEDURE — 71045 X-RAY EXAM CHEST 1 VIEW: CPT

## 2021-06-10 PROCEDURE — 25010000002 CEFTRIAXONE PER 250 MG: Performed by: INTERNAL MEDICINE

## 2021-06-10 PROCEDURE — 97535 SELF CARE MNGMENT TRAINING: CPT

## 2021-06-10 PROCEDURE — 99024 POSTOP FOLLOW-UP VISIT: CPT | Performed by: THORACIC SURGERY (CARDIOTHORACIC VASCULAR SURGERY)

## 2021-06-10 PROCEDURE — 97605 NEG PRS WND THER DME<=50SQCM: CPT

## 2021-06-10 PROCEDURE — 99232 SBSQ HOSP IP/OBS MODERATE 35: CPT | Performed by: INTERNAL MEDICINE

## 2021-06-10 RX ORDER — LINEZOLID 600 MG/1
600 TABLET, FILM COATED ORAL EVERY 12 HOURS SCHEDULED
Status: DISCONTINUED | OUTPATIENT
Start: 2021-06-10 | End: 2021-06-10

## 2021-06-10 RX ADMIN — Medication 250 MG: at 23:10

## 2021-06-10 RX ADMIN — HYDRALAZINE HYDROCHLORIDE 10 MG: 10 TABLET ORAL at 06:01

## 2021-06-10 RX ADMIN — INSULIN DETEMIR 15 UNITS: 100 INJECTION, SOLUTION SUBCUTANEOUS at 09:35

## 2021-06-10 RX ADMIN — PREGABALIN 100 MG: 100 CAPSULE ORAL at 09:31

## 2021-06-10 RX ADMIN — IPRATROPIUM BROMIDE AND ALBUTEROL SULFATE 3 ML: 2.5; .5 SOLUTION RESPIRATORY (INHALATION) at 07:23

## 2021-06-10 RX ADMIN — INSULIN LISPRO 2 UNITS: 100 INJECTION, SOLUTION INTRAVENOUS; SUBCUTANEOUS at 09:33

## 2021-06-10 RX ADMIN — SODIUM CHLORIDE, PRESERVATIVE FREE 10 ML: 5 INJECTION INTRAVENOUS at 23:11

## 2021-06-10 RX ADMIN — ASPIRIN 81 MG: 81 TABLET, COATED ORAL at 09:33

## 2021-06-10 RX ADMIN — TAZOBACTAM SODIUM AND PIPERACILLIN SODIUM 3.38 G: 375; 3 INJECTION, SOLUTION INTRAVENOUS at 03:57

## 2021-06-10 RX ADMIN — ALBUTEROL SULFATE 2.5 MG: 2.5 SOLUTION RESPIRATORY (INHALATION) at 23:35

## 2021-06-10 RX ADMIN — INSULIN LISPRO 3 UNITS: 100 INJECTION, SOLUTION INTRAVENOUS; SUBCUTANEOUS at 12:43

## 2021-06-10 RX ADMIN — FUROSEMIDE 40 MG: 10 INJECTION, SOLUTION INTRAMUSCULAR; INTRAVENOUS at 06:01

## 2021-06-10 RX ADMIN — IPRATROPIUM BROMIDE AND ALBUTEROL SULFATE 3 ML: 2.5; .5 SOLUTION RESPIRATORY (INHALATION) at 14:39

## 2021-06-10 RX ADMIN — INSULIN LISPRO 5 UNITS: 100 INJECTION, SOLUTION INTRAVENOUS; SUBCUTANEOUS at 17:17

## 2021-06-10 RX ADMIN — INSULIN DETEMIR 15 UNITS: 100 INJECTION, SOLUTION SUBCUTANEOUS at 23:13

## 2021-06-10 RX ADMIN — ISOSORBIDE DINITRATE: 20 TABLET ORAL at 15:22

## 2021-06-10 RX ADMIN — POTASSIUM CHLORIDE 60 MEQ: 750 CAPSULE, EXTENDED RELEASE ORAL at 09:31

## 2021-06-10 RX ADMIN — LINEZOLID 600 MG: 600 TABLET, FILM COATED ORAL at 09:32

## 2021-06-10 RX ADMIN — IPRATROPIUM BROMIDE AND ALBUTEROL SULFATE 3 ML: 2.5; .5 SOLUTION RESPIRATORY (INHALATION) at 17:43

## 2021-06-10 RX ADMIN — SODIUM CHLORIDE 2 G: 900 INJECTION INTRAVENOUS at 23:12

## 2021-06-10 RX ADMIN — INSULIN LISPRO 5 UNITS: 100 INJECTION, SOLUTION INTRAVENOUS; SUBCUTANEOUS at 13:16

## 2021-06-10 RX ADMIN — INSULIN LISPRO 5 UNITS: 100 INJECTION, SOLUTION INTRAVENOUS; SUBCUTANEOUS at 17:16

## 2021-06-10 RX ADMIN — IPRATROPIUM BROMIDE AND ALBUTEROL SULFATE 3 ML: 2.5; .5 SOLUTION RESPIRATORY (INHALATION) at 20:10

## 2021-06-10 RX ADMIN — ISOSORBIDE DINITRATE: 20 TABLET ORAL at 09:31

## 2021-06-10 RX ADMIN — DOCUSATE SODIUM 50MG AND SENNOSIDES 8.6MG 2 TABLET: 8.6; 5 TABLET, FILM COATED ORAL at 09:32

## 2021-06-10 RX ADMIN — ISOSORBIDE DINITRATE: 20 TABLET ORAL at 23:10

## 2021-06-10 RX ADMIN — CARVEDILOL 6.25 MG: 6.25 TABLET, FILM COATED ORAL at 17:17

## 2021-06-10 RX ADMIN — INSULIN LISPRO 5 UNITS: 100 INJECTION, SOLUTION INTRAVENOUS; SUBCUTANEOUS at 09:34

## 2021-06-10 RX ADMIN — CARVEDILOL 6.25 MG: 6.25 TABLET, FILM COATED ORAL at 09:32

## 2021-06-10 RX ADMIN — SODIUM CHLORIDE, PRESERVATIVE FREE 10 ML: 5 INJECTION INTRAVENOUS at 09:30

## 2021-06-10 RX ADMIN — FUROSEMIDE 40 MG: 10 INJECTION, SOLUTION INTRAMUSCULAR; INTRAVENOUS at 17:16

## 2021-06-10 RX ADMIN — DOCUSATE SODIUM 50MG AND SENNOSIDES 8.6MG 2 TABLET: 8.6; 5 TABLET, FILM COATED ORAL at 23:10

## 2021-06-10 RX ADMIN — ALLOPURINOL 100 MG: 100 TABLET ORAL at 09:31

## 2021-06-10 RX ADMIN — ATORVASTATIN CALCIUM 80 MG: 40 TABLET, FILM COATED ORAL at 23:10

## 2021-06-10 RX ADMIN — Medication 250 MG: at 09:33

## 2021-06-11 ENCOUNTER — APPOINTMENT (OUTPATIENT)
Dept: CT IMAGING | Facility: HOSPITAL | Age: 81
End: 2021-06-11

## 2021-06-11 ENCOUNTER — APPOINTMENT (OUTPATIENT)
Dept: GENERAL RADIOLOGY | Facility: HOSPITAL | Age: 81
End: 2021-06-11

## 2021-06-11 LAB
ANION GAP SERPL CALCULATED.3IONS-SCNC: 8 MMOL/L (ref 5–15)
APPEARANCE FLD: ABNORMAL
APPEARANCE FLD: CLEAR
BACTERIA FLD CULT: NORMAL
BUN SERPL-MCNC: 41 MG/DL (ref 8–23)
BUN/CREAT SERPL: 34.2 (ref 7–25)
CALCIUM SPEC-SCNC: 8.4 MG/DL (ref 8.6–10.5)
CHLORIDE SERPL-SCNC: 97 MMOL/L (ref 98–107)
CO2 SERPL-SCNC: 33 MMOL/L (ref 22–29)
COLOR FLD: YELLOW
COLOR FLD: YELLOW
CREAT SERPL-MCNC: 1.2 MG/DL (ref 0.57–1)
GFR SERPL CREATININE-BSD FRML MDRD: 43 ML/MIN/1.73
GLUCOSE BLDC GLUCOMTR-MCNC: 242 MG/DL (ref 70–130)
GLUCOSE BLDC GLUCOMTR-MCNC: 274 MG/DL (ref 70–130)
GLUCOSE BLDC GLUCOMTR-MCNC: 337 MG/DL (ref 70–130)
GLUCOSE BLDC GLUCOMTR-MCNC: 352 MG/DL (ref 70–130)
GLUCOSE SERPL-MCNC: 253 MG/DL (ref 65–99)
GRAM STN SPEC: NORMAL
GRAM STN SPEC: NORMAL
HCT VFR BLD AUTO: 31.4 % (ref 34–46.6)
HGB BLD-MCNC: 9.9 G/DL (ref 12–15.9)
LYMPHOCYTES NFR FLD MANUAL: 26 %
LYMPHOCYTES NFR FLD MANUAL: 30 %
MACROPHAGE FLUID: 6 %
MESOTHL CELL NFR FLD MANUAL: 3 %
MESOTHL CELL NFR FLD MANUAL: 3 %
MONOCYTES NFR FLD: 3 %
MONOCYTES NFR FLD: 49 %
NEUTROPHILS NFR FLD MANUAL: 22 %
NEUTROPHILS NFR FLD MANUAL: 58 %
PLATELET # BLD AUTO: 228 10*3/MM3 (ref 140–450)
POTASSIUM SERPL-SCNC: 4 MMOL/L (ref 3.5–5.2)
RBC # FLD AUTO: 2000 /MM3
RBC # FLD AUTO: <2000 /MM3
SODIUM SERPL-SCNC: 138 MMOL/L (ref 136–145)
WBC # FLD AUTO: 368 /MM3
WBC # FLD AUTO: 73 /MM3

## 2021-06-11 PROCEDURE — 0W9930Z DRAINAGE OF RIGHT PLEURAL CAVITY WITH DRAINAGE DEVICE, PERCUTANEOUS APPROACH: ICD-10-PCS | Performed by: RADIOLOGY

## 2021-06-11 PROCEDURE — 75989 ABSCESS DRAINAGE UNDER X-RAY: CPT

## 2021-06-11 PROCEDURE — C1729 CATH, DRAINAGE: HCPCS

## 2021-06-11 PROCEDURE — 32557 INSERT CATH PLEURA W/ IMAGE: CPT | Performed by: RADIOLOGY

## 2021-06-11 PROCEDURE — 0W9B30Z DRAINAGE OF LEFT PLEURAL CAVITY WITH DRAINAGE DEVICE, PERCUTANEOUS APPROACH: ICD-10-PCS | Performed by: RADIOLOGY

## 2021-06-11 PROCEDURE — 99024 POSTOP FOLLOW-UP VISIT: CPT | Performed by: THORACIC SURGERY (CARDIOTHORACIC VASCULAR SURGERY)

## 2021-06-11 PROCEDURE — 71250 CT THORAX DX C-: CPT

## 2021-06-11 PROCEDURE — 71045 X-RAY EXAM CHEST 1 VIEW: CPT

## 2021-06-11 PROCEDURE — 85049 AUTOMATED PLATELET COUNT: CPT | Performed by: RADIOLOGY

## 2021-06-11 PROCEDURE — 80048 BASIC METABOLIC PNL TOTAL CA: CPT | Performed by: INTERNAL MEDICINE

## 2021-06-11 PROCEDURE — 85014 HEMATOCRIT: CPT | Performed by: RADIOLOGY

## 2021-06-11 PROCEDURE — 25010000003 LIDOCAINE 1 % SOLUTION: Performed by: RADIOLOGY

## 2021-06-11 PROCEDURE — 89051 BODY FLUID CELL COUNT: CPT | Performed by: INTERNAL MEDICINE

## 2021-06-11 PROCEDURE — 97597 DBRDMT OPN WND 1ST 20 CM/<: CPT

## 2021-06-11 PROCEDURE — 85018 HEMOGLOBIN: CPT | Performed by: RADIOLOGY

## 2021-06-11 PROCEDURE — 25010000002 FUROSEMIDE PER 20 MG: Performed by: INTERNAL MEDICINE

## 2021-06-11 PROCEDURE — 97605 NEG PRS WND THER DME<=50SQCM: CPT

## 2021-06-11 PROCEDURE — 99231 SBSQ HOSP IP/OBS SF/LOW 25: CPT | Performed by: INTERNAL MEDICINE

## 2021-06-11 PROCEDURE — 63710000001 INSULIN LISPRO (HUMAN) PER 5 UNITS: Performed by: INTERNAL MEDICINE

## 2021-06-11 PROCEDURE — 63710000001 INSULIN DETEMIR PER 5 UNITS: Performed by: INTERNAL MEDICINE

## 2021-06-11 PROCEDURE — 82962 GLUCOSE BLOOD TEST: CPT

## 2021-06-11 PROCEDURE — 99233 SBSQ HOSP IP/OBS HIGH 50: CPT | Performed by: INTERNAL MEDICINE

## 2021-06-11 PROCEDURE — 94799 UNLISTED PULMONARY SVC/PX: CPT

## 2021-06-11 RX ORDER — HYDROCODONE BITARTRATE AND ACETAMINOPHEN 5; 325 MG/1; MG/1
1 TABLET ORAL EVERY 6 HOURS PRN
Status: COMPLETED | OUTPATIENT
Start: 2021-06-11 | End: 2021-06-15

## 2021-06-11 RX ORDER — HEPARIN SODIUM 5000 [USP'U]/ML
5000 INJECTION, SOLUTION INTRAVENOUS; SUBCUTANEOUS EVERY 8 HOURS SCHEDULED
Status: DISCONTINUED | OUTPATIENT
Start: 2021-06-11 | End: 2021-06-11

## 2021-06-11 RX ORDER — CARVEDILOL 12.5 MG/1
12.5 TABLET ORAL 2 TIMES DAILY WITH MEALS
Status: DISCONTINUED | OUTPATIENT
Start: 2021-06-11 | End: 2021-06-26 | Stop reason: HOSPADM

## 2021-06-11 RX ORDER — HEPARIN SODIUM 5000 [USP'U]/ML
5000 INJECTION, SOLUTION INTRAVENOUS; SUBCUTANEOUS EVERY 8 HOURS SCHEDULED
Status: DISCONTINUED | OUTPATIENT
Start: 2021-06-12 | End: 2021-06-26 | Stop reason: HOSPADM

## 2021-06-11 RX ORDER — LIDOCAINE HYDROCHLORIDE 10 MG/ML
20 INJECTION, SOLUTION INFILTRATION; PERINEURAL ONCE
Status: COMPLETED | OUTPATIENT
Start: 2021-06-11 | End: 2021-06-11

## 2021-06-11 RX ADMIN — CARVEDILOL 6.25 MG: 6.25 TABLET, FILM COATED ORAL at 08:20

## 2021-06-11 RX ADMIN — Medication 250 MG: at 08:20

## 2021-06-11 RX ADMIN — FUROSEMIDE 40 MG: 10 INJECTION, SOLUTION INTRAMUSCULAR; INTRAVENOUS at 18:02

## 2021-06-11 RX ADMIN — SODIUM CHLORIDE, PRESERVATIVE FREE 10 ML: 5 INJECTION INTRAVENOUS at 08:20

## 2021-06-11 RX ADMIN — FUROSEMIDE 40 MG: 10 INJECTION, SOLUTION INTRAMUSCULAR; INTRAVENOUS at 06:52

## 2021-06-11 RX ADMIN — INSULIN DETEMIR 18 UNITS: 100 INJECTION, SOLUTION SUBCUTANEOUS at 21:16

## 2021-06-11 RX ADMIN — Medication 250 MG: at 21:06

## 2021-06-11 RX ADMIN — SODIUM CHLORIDE, PRESERVATIVE FREE 10 ML: 5 INJECTION INTRAVENOUS at 21:06

## 2021-06-11 RX ADMIN — ISOSORBIDE DINITRATE: 20 TABLET ORAL at 14:56

## 2021-06-11 RX ADMIN — LIDOCAINE HYDROCHLORIDE 20 ML: 10 INJECTION, SOLUTION INFILTRATION; PERINEURAL at 16:43

## 2021-06-11 RX ADMIN — INSULIN LISPRO 5 UNITS: 100 INJECTION, SOLUTION INTRAVENOUS; SUBCUTANEOUS at 12:08

## 2021-06-11 RX ADMIN — INSULIN LISPRO 8 UNITS: 100 INJECTION, SOLUTION INTRAVENOUS; SUBCUTANEOUS at 08:20

## 2021-06-11 RX ADMIN — ATORVASTATIN CALCIUM 80 MG: 40 TABLET, FILM COATED ORAL at 21:09

## 2021-06-11 RX ADMIN — ISOSORBIDE DINITRATE: 20 TABLET ORAL at 21:06

## 2021-06-11 RX ADMIN — CARVEDILOL 12.5 MG: 12.5 TABLET, FILM COATED ORAL at 18:02

## 2021-06-11 RX ADMIN — ISOSORBIDE DINITRATE: 20 TABLET ORAL at 06:52

## 2021-06-11 RX ADMIN — ALLOPURINOL 100 MG: 100 TABLET ORAL at 08:19

## 2021-06-11 RX ADMIN — DOCUSATE SODIUM 50MG AND SENNOSIDES 8.6MG 2 TABLET: 8.6; 5 TABLET, FILM COATED ORAL at 08:19

## 2021-06-11 RX ADMIN — INSULIN LISPRO 8 UNITS: 100 INJECTION, SOLUTION INTRAVENOUS; SUBCUTANEOUS at 12:08

## 2021-06-11 RX ADMIN — IPRATROPIUM BROMIDE AND ALBUTEROL SULFATE 3 ML: 2.5; .5 SOLUTION RESPIRATORY (INHALATION) at 12:36

## 2021-06-11 RX ADMIN — ASPIRIN 81 MG: 81 TABLET, COATED ORAL at 08:20

## 2021-06-11 RX ADMIN — IPRATROPIUM BROMIDE AND ALBUTEROL SULFATE 3 ML: 2.5; .5 SOLUTION RESPIRATORY (INHALATION) at 21:20

## 2021-06-11 RX ADMIN — INSULIN LISPRO 4 UNITS: 100 INJECTION, SOLUTION INTRAVENOUS; SUBCUTANEOUS at 08:23

## 2021-06-11 RX ADMIN — INSULIN LISPRO 8 UNITS: 100 INJECTION, SOLUTION INTRAVENOUS; SUBCUTANEOUS at 18:03

## 2021-06-11 RX ADMIN — INSULIN LISPRO 3 UNITS: 100 INJECTION, SOLUTION INTRAVENOUS; SUBCUTANEOUS at 18:03

## 2021-06-11 RX ADMIN — INSULIN DETEMIR 18 UNITS: 100 INJECTION, SOLUTION SUBCUTANEOUS at 08:24

## 2021-06-11 RX ADMIN — HYDROXYZINE HYDROCHLORIDE 25 MG: 25 TABLET, FILM COATED ORAL at 15:40

## 2021-06-11 RX ADMIN — PREGABALIN 100 MG: 100 CAPSULE ORAL at 08:19

## 2021-06-11 RX ADMIN — POTASSIUM CHLORIDE 60 MEQ: 750 CAPSULE, EXTENDED RELEASE ORAL at 08:19

## 2021-06-11 RX ADMIN — HYDROCODONE BITARTRATE AND ACETAMINOPHEN 1 TABLET: 5; 325 TABLET ORAL at 21:04

## 2021-06-12 ENCOUNTER — APPOINTMENT (OUTPATIENT)
Dept: GENERAL RADIOLOGY | Facility: HOSPITAL | Age: 81
End: 2021-06-12

## 2021-06-12 LAB
ANION GAP SERPL CALCULATED.3IONS-SCNC: 7 MMOL/L (ref 5–15)
BUN SERPL-MCNC: 37 MG/DL (ref 8–23)
BUN/CREAT SERPL: 33.3 (ref 7–25)
CALCIUM SPEC-SCNC: 8.3 MG/DL (ref 8.6–10.5)
CHLORIDE SERPL-SCNC: 96 MMOL/L (ref 98–107)
CO2 SERPL-SCNC: 33 MMOL/L (ref 22–29)
CREAT SERPL-MCNC: 1.11 MG/DL (ref 0.57–1)
GFR SERPL CREATININE-BSD FRML MDRD: 47 ML/MIN/1.73
GLUCOSE BLDC GLUCOMTR-MCNC: 241 MG/DL (ref 70–130)
GLUCOSE BLDC GLUCOMTR-MCNC: 254 MG/DL (ref 70–130)
GLUCOSE BLDC GLUCOMTR-MCNC: 318 MG/DL (ref 70–130)
GLUCOSE BLDC GLUCOMTR-MCNC: 323 MG/DL (ref 70–130)
GLUCOSE SERPL-MCNC: 220 MG/DL (ref 65–99)
POTASSIUM SERPL-SCNC: 4 MMOL/L (ref 3.5–5.2)
SODIUM SERPL-SCNC: 136 MMOL/L (ref 136–145)

## 2021-06-12 PROCEDURE — 80048 BASIC METABOLIC PNL TOTAL CA: CPT | Performed by: INTERNAL MEDICINE

## 2021-06-12 PROCEDURE — 63710000001 INSULIN LISPRO (HUMAN) PER 5 UNITS: Performed by: INTERNAL MEDICINE

## 2021-06-12 PROCEDURE — 82962 GLUCOSE BLOOD TEST: CPT

## 2021-06-12 PROCEDURE — 71045 X-RAY EXAM CHEST 1 VIEW: CPT

## 2021-06-12 PROCEDURE — 99024 POSTOP FOLLOW-UP VISIT: CPT | Performed by: THORACIC SURGERY (CARDIOTHORACIC VASCULAR SURGERY)

## 2021-06-12 PROCEDURE — 25010000002 CEFTRIAXONE PER 250 MG: Performed by: INTERNAL MEDICINE

## 2021-06-12 PROCEDURE — 99231 SBSQ HOSP IP/OBS SF/LOW 25: CPT | Performed by: INTERNAL MEDICINE

## 2021-06-12 PROCEDURE — 94799 UNLISTED PULMONARY SVC/PX: CPT

## 2021-06-12 PROCEDURE — 99233 SBSQ HOSP IP/OBS HIGH 50: CPT | Performed by: INTERNAL MEDICINE

## 2021-06-12 PROCEDURE — 97605 NEG PRS WND THER DME<=50SQCM: CPT | Performed by: PHYSICAL THERAPIST

## 2021-06-12 PROCEDURE — 25010000002 FUROSEMIDE PER 20 MG: Performed by: INTERNAL MEDICINE

## 2021-06-12 PROCEDURE — 25010000002 HEPARIN (PORCINE) PER 1000 UNITS: Performed by: INTERNAL MEDICINE

## 2021-06-12 PROCEDURE — 63710000001 INSULIN DETEMIR PER 5 UNITS: Performed by: INTERNAL MEDICINE

## 2021-06-12 RX ADMIN — Medication 250 MG: at 21:43

## 2021-06-12 RX ADMIN — INSULIN LISPRO 5 UNITS: 100 INJECTION, SOLUTION INTRAVENOUS; SUBCUTANEOUS at 12:48

## 2021-06-12 RX ADMIN — ALLOPURINOL 100 MG: 100 TABLET ORAL at 11:03

## 2021-06-12 RX ADMIN — ATORVASTATIN CALCIUM 80 MG: 40 TABLET, FILM COATED ORAL at 21:43

## 2021-06-12 RX ADMIN — HYDROCODONE BITARTRATE AND ACETAMINOPHEN 1 TABLET: 5; 325 TABLET ORAL at 16:43

## 2021-06-12 RX ADMIN — ASPIRIN 81 MG: 81 TABLET, COATED ORAL at 11:02

## 2021-06-12 RX ADMIN — HYDROCODONE BITARTRATE AND ACETAMINOPHEN 1 TABLET: 5; 325 TABLET ORAL at 22:44

## 2021-06-12 RX ADMIN — SODIUM CHLORIDE, PRESERVATIVE FREE 10 ML: 5 INJECTION INTRAVENOUS at 11:03

## 2021-06-12 RX ADMIN — INSULIN LISPRO 3 UNITS: 100 INJECTION, SOLUTION INTRAVENOUS; SUBCUTANEOUS at 18:59

## 2021-06-12 RX ADMIN — ISOSORBIDE DINITRATE: 20 TABLET ORAL at 14:57

## 2021-06-12 RX ADMIN — HEPARIN SODIUM 5000 UNITS: 5000 INJECTION INTRAVENOUS; SUBCUTANEOUS at 21:43

## 2021-06-12 RX ADMIN — INSULIN LISPRO 8 UNITS: 100 INJECTION, SOLUTION INTRAVENOUS; SUBCUTANEOUS at 11:01

## 2021-06-12 RX ADMIN — PREGABALIN 100 MG: 100 CAPSULE ORAL at 11:02

## 2021-06-12 RX ADMIN — DOCUSATE SODIUM 50MG AND SENNOSIDES 8.6MG 2 TABLET: 8.6; 5 TABLET, FILM COATED ORAL at 21:43

## 2021-06-12 RX ADMIN — IPRATROPIUM BROMIDE AND ALBUTEROL SULFATE 3 ML: 2.5; .5 SOLUTION RESPIRATORY (INHALATION) at 17:07

## 2021-06-12 RX ADMIN — FUROSEMIDE 40 MG: 10 INJECTION, SOLUTION INTRAMUSCULAR; INTRAVENOUS at 18:58

## 2021-06-12 RX ADMIN — HYDROXYZINE HYDROCHLORIDE 25 MG: 25 TABLET, FILM COATED ORAL at 05:05

## 2021-06-12 RX ADMIN — HYDROXYZINE HYDROCHLORIDE 25 MG: 25 TABLET, FILM COATED ORAL at 16:52

## 2021-06-12 RX ADMIN — HYDROXYZINE HYDROCHLORIDE 25 MG: 25 TABLET, FILM COATED ORAL at 22:44

## 2021-06-12 RX ADMIN — INSULIN LISPRO 8 UNITS: 100 INJECTION, SOLUTION INTRAVENOUS; SUBCUTANEOUS at 18:59

## 2021-06-12 RX ADMIN — HEPARIN SODIUM 5000 UNITS: 5000 INJECTION INTRAVENOUS; SUBCUTANEOUS at 05:08

## 2021-06-12 RX ADMIN — IPRATROPIUM BROMIDE AND ALBUTEROL SULFATE 3 ML: 2.5; .5 SOLUTION RESPIRATORY (INHALATION) at 19:05

## 2021-06-12 RX ADMIN — POTASSIUM CHLORIDE 60 MEQ: 750 CAPSULE, EXTENDED RELEASE ORAL at 11:02

## 2021-06-12 RX ADMIN — Medication 250 MG: at 11:02

## 2021-06-12 RX ADMIN — HEPARIN SODIUM 5000 UNITS: 5000 INJECTION INTRAVENOUS; SUBCUTANEOUS at 14:57

## 2021-06-12 RX ADMIN — ISOSORBIDE DINITRATE: 20 TABLET ORAL at 05:08

## 2021-06-12 RX ADMIN — INSULIN LISPRO 8 UNITS: 100 INJECTION, SOLUTION INTRAVENOUS; SUBCUTANEOUS at 12:48

## 2021-06-12 RX ADMIN — DOCUSATE SODIUM 50MG AND SENNOSIDES 8.6MG 2 TABLET: 8.6; 5 TABLET, FILM COATED ORAL at 11:01

## 2021-06-12 RX ADMIN — CARVEDILOL 12.5 MG: 12.5 TABLET, FILM COATED ORAL at 18:59

## 2021-06-12 RX ADMIN — INSULIN DETEMIR 20 UNITS: 100 INJECTION, SOLUTION SUBCUTANEOUS at 21:41

## 2021-06-12 RX ADMIN — SODIUM CHLORIDE 2 G: 900 INJECTION INTRAVENOUS at 00:13

## 2021-06-12 RX ADMIN — HYDROCODONE BITARTRATE AND ACETAMINOPHEN 1 TABLET: 5; 325 TABLET ORAL at 05:05

## 2021-06-12 RX ADMIN — INSULIN DETEMIR 20 UNITS: 100 INJECTION, SOLUTION SUBCUTANEOUS at 12:40

## 2021-06-12 RX ADMIN — FUROSEMIDE 40 MG: 10 INJECTION, SOLUTION INTRAMUSCULAR; INTRAVENOUS at 05:12

## 2021-06-12 RX ADMIN — CARVEDILOL 12.5 MG: 12.5 TABLET, FILM COATED ORAL at 11:02

## 2021-06-12 RX ADMIN — SODIUM CHLORIDE 2 G: 900 INJECTION INTRAVENOUS at 22:45

## 2021-06-12 RX ADMIN — SODIUM CHLORIDE, PRESERVATIVE FREE 10 ML: 5 INJECTION INTRAVENOUS at 21:45

## 2021-06-12 RX ADMIN — ISOSORBIDE DINITRATE: 20 TABLET ORAL at 21:43

## 2021-06-12 RX ADMIN — INSULIN LISPRO 3 UNITS: 100 INJECTION, SOLUTION INTRAVENOUS; SUBCUTANEOUS at 11:03

## 2021-06-13 LAB
ANION GAP SERPL CALCULATED.3IONS-SCNC: 9 MMOL/L (ref 5–15)
BACTERIA SPEC ANAEROBE CULT: NORMAL
BUN SERPL-MCNC: 39 MG/DL (ref 8–23)
BUN/CREAT SERPL: 37.5 (ref 7–25)
CALCIUM SPEC-SCNC: 8.5 MG/DL (ref 8.6–10.5)
CHLORIDE SERPL-SCNC: 98 MMOL/L (ref 98–107)
CO2 SERPL-SCNC: 32 MMOL/L (ref 22–29)
CREAT SERPL-MCNC: 1.04 MG/DL (ref 0.57–1)
GFR SERPL CREATININE-BSD FRML MDRD: 51 ML/MIN/1.73
GLUCOSE BLDC GLUCOMTR-MCNC: 164 MG/DL (ref 70–130)
GLUCOSE BLDC GLUCOMTR-MCNC: 269 MG/DL (ref 70–130)
GLUCOSE BLDC GLUCOMTR-MCNC: 293 MG/DL (ref 70–130)
GLUCOSE BLDC GLUCOMTR-MCNC: 319 MG/DL (ref 70–130)
GLUCOSE SERPL-MCNC: 167 MG/DL (ref 65–99)
POTASSIUM SERPL-SCNC: 4.2 MMOL/L (ref 3.5–5.2)
SODIUM SERPL-SCNC: 139 MMOL/L (ref 136–145)

## 2021-06-13 PROCEDURE — 99231 SBSQ HOSP IP/OBS SF/LOW 25: CPT | Performed by: INTERNAL MEDICINE

## 2021-06-13 PROCEDURE — 99024 POSTOP FOLLOW-UP VISIT: CPT | Performed by: THORACIC SURGERY (CARDIOTHORACIC VASCULAR SURGERY)

## 2021-06-13 PROCEDURE — 80048 BASIC METABOLIC PNL TOTAL CA: CPT | Performed by: INTERNAL MEDICINE

## 2021-06-13 PROCEDURE — 82962 GLUCOSE BLOOD TEST: CPT

## 2021-06-13 PROCEDURE — 63710000001 INSULIN LISPRO (HUMAN) PER 5 UNITS: Performed by: INTERNAL MEDICINE

## 2021-06-13 PROCEDURE — 25010000002 HEPARIN (PORCINE) PER 1000 UNITS: Performed by: INTERNAL MEDICINE

## 2021-06-13 PROCEDURE — 94799 UNLISTED PULMONARY SVC/PX: CPT

## 2021-06-13 PROCEDURE — 99232 SBSQ HOSP IP/OBS MODERATE 35: CPT | Performed by: INTERNAL MEDICINE

## 2021-06-13 PROCEDURE — 97605 NEG PRS WND THER DME<=50SQCM: CPT | Performed by: PHYSICAL THERAPIST

## 2021-06-13 PROCEDURE — 63710000001 INSULIN DETEMIR PER 5 UNITS: Performed by: INTERNAL MEDICINE

## 2021-06-13 PROCEDURE — 25010000002 FUROSEMIDE PER 20 MG: Performed by: INTERNAL MEDICINE

## 2021-06-13 RX ADMIN — ACETAMINOPHEN 650 MG: 325 TABLET ORAL at 19:06

## 2021-06-13 RX ADMIN — FUROSEMIDE 40 MG: 10 INJECTION, SOLUTION INTRAMUSCULAR; INTRAVENOUS at 18:50

## 2021-06-13 RX ADMIN — CARVEDILOL 12.5 MG: 12.5 TABLET, FILM COATED ORAL at 10:05

## 2021-06-13 RX ADMIN — HEPARIN SODIUM 5000 UNITS: 5000 INJECTION INTRAVENOUS; SUBCUTANEOUS at 15:15

## 2021-06-13 RX ADMIN — CARVEDILOL 12.5 MG: 12.5 TABLET, FILM COATED ORAL at 18:50

## 2021-06-13 RX ADMIN — INSULIN LISPRO 8 UNITS: 100 INJECTION, SOLUTION INTRAVENOUS; SUBCUTANEOUS at 10:06

## 2021-06-13 RX ADMIN — IPRATROPIUM BROMIDE AND ALBUTEROL SULFATE 3 ML: 2.5; .5 SOLUTION RESPIRATORY (INHALATION) at 06:57

## 2021-06-13 RX ADMIN — ISOSORBIDE DINITRATE: 20 TABLET ORAL at 06:05

## 2021-06-13 RX ADMIN — ISOSORBIDE DINITRATE: 20 TABLET ORAL at 15:14

## 2021-06-13 RX ADMIN — INSULIN LISPRO 8 UNITS: 100 INJECTION, SOLUTION INTRAVENOUS; SUBCUTANEOUS at 18:50

## 2021-06-13 RX ADMIN — INSULIN LISPRO 4 UNITS: 100 INJECTION, SOLUTION INTRAVENOUS; SUBCUTANEOUS at 18:51

## 2021-06-13 RX ADMIN — DOCUSATE SODIUM 50MG AND SENNOSIDES 8.6MG 2 TABLET: 8.6; 5 TABLET, FILM COATED ORAL at 10:05

## 2021-06-13 RX ADMIN — INSULIN LISPRO 5 UNITS: 100 INJECTION, SOLUTION INTRAVENOUS; SUBCUTANEOUS at 12:57

## 2021-06-13 RX ADMIN — IPRATROPIUM BROMIDE AND ALBUTEROL SULFATE 3 ML: 2.5; .5 SOLUTION RESPIRATORY (INHALATION) at 19:42

## 2021-06-13 RX ADMIN — ASPIRIN 81 MG: 81 TABLET, COATED ORAL at 10:05

## 2021-06-13 RX ADMIN — PREGABALIN 100 MG: 100 CAPSULE ORAL at 10:05

## 2021-06-13 RX ADMIN — POTASSIUM CHLORIDE 60 MEQ: 750 CAPSULE, EXTENDED RELEASE ORAL at 10:04

## 2021-06-13 RX ADMIN — INSULIN DETEMIR 20 UNITS: 100 INJECTION, SOLUTION SUBCUTANEOUS at 21:18

## 2021-06-13 RX ADMIN — INSULIN LISPRO 8 UNITS: 100 INJECTION, SOLUTION INTRAVENOUS; SUBCUTANEOUS at 12:57

## 2021-06-13 RX ADMIN — SODIUM CHLORIDE, PRESERVATIVE FREE 10 ML: 5 INJECTION INTRAVENOUS at 10:06

## 2021-06-13 RX ADMIN — IPRATROPIUM BROMIDE AND ALBUTEROL SULFATE 3 ML: 2.5; .5 SOLUTION RESPIRATORY (INHALATION) at 11:02

## 2021-06-13 RX ADMIN — FUROSEMIDE 40 MG: 10 INJECTION, SOLUTION INTRAMUSCULAR; INTRAVENOUS at 06:05

## 2021-06-13 RX ADMIN — Medication 250 MG: at 21:17

## 2021-06-13 RX ADMIN — HEPARIN SODIUM 5000 UNITS: 5000 INJECTION INTRAVENOUS; SUBCUTANEOUS at 21:18

## 2021-06-13 RX ADMIN — INSULIN LISPRO 2 UNITS: 100 INJECTION, SOLUTION INTRAVENOUS; SUBCUTANEOUS at 10:06

## 2021-06-13 RX ADMIN — Medication 250 MG: at 10:05

## 2021-06-13 RX ADMIN — HEPARIN SODIUM 5000 UNITS: 5000 INJECTION INTRAVENOUS; SUBCUTANEOUS at 06:05

## 2021-06-13 RX ADMIN — IPRATROPIUM BROMIDE AND ALBUTEROL SULFATE 3 ML: 2.5; .5 SOLUTION RESPIRATORY (INHALATION) at 16:08

## 2021-06-13 RX ADMIN — INSULIN DETEMIR 20 UNITS: 100 INJECTION, SOLUTION SUBCUTANEOUS at 10:08

## 2021-06-13 RX ADMIN — ISOSORBIDE DINITRATE: 20 TABLET ORAL at 21:17

## 2021-06-13 RX ADMIN — SODIUM CHLORIDE, PRESERVATIVE FREE 10 ML: 5 INJECTION INTRAVENOUS at 21:17

## 2021-06-13 RX ADMIN — HYDROXYZINE HYDROCHLORIDE 25 MG: 25 TABLET, FILM COATED ORAL at 19:06

## 2021-06-13 RX ADMIN — DOCUSATE SODIUM 50MG AND SENNOSIDES 8.6MG 2 TABLET: 8.6; 5 TABLET, FILM COATED ORAL at 21:17

## 2021-06-13 RX ADMIN — ATORVASTATIN CALCIUM 80 MG: 40 TABLET, FILM COATED ORAL at 21:17

## 2021-06-13 RX ADMIN — ALLOPURINOL 100 MG: 100 TABLET ORAL at 10:05

## 2021-06-14 LAB
ANION GAP SERPL CALCULATED.3IONS-SCNC: 9 MMOL/L (ref 5–15)
BUN SERPL-MCNC: 35 MG/DL (ref 8–23)
BUN/CREAT SERPL: 34.3 (ref 7–25)
CALCIUM SPEC-SCNC: 8.3 MG/DL (ref 8.6–10.5)
CHLORIDE SERPL-SCNC: 101 MMOL/L (ref 98–107)
CO2 SERPL-SCNC: 31 MMOL/L (ref 22–29)
CREAT SERPL-MCNC: 1.02 MG/DL (ref 0.57–1)
GFR SERPL CREATININE-BSD FRML MDRD: 52 ML/MIN/1.73
GLUCOSE BLDC GLUCOMTR-MCNC: 173 MG/DL (ref 70–130)
GLUCOSE BLDC GLUCOMTR-MCNC: 232 MG/DL (ref 70–130)
GLUCOSE BLDC GLUCOMTR-MCNC: 324 MG/DL (ref 70–130)
GLUCOSE BLDC GLUCOMTR-MCNC: 333 MG/DL (ref 70–130)
GLUCOSE SERPL-MCNC: 137 MG/DL (ref 65–99)
POTASSIUM SERPL-SCNC: 4 MMOL/L (ref 3.5–5.2)
SODIUM SERPL-SCNC: 141 MMOL/L (ref 136–145)

## 2021-06-14 PROCEDURE — 99024 POSTOP FOLLOW-UP VISIT: CPT | Performed by: THORACIC SURGERY (CARDIOTHORACIC VASCULAR SURGERY)

## 2021-06-14 PROCEDURE — 94799 UNLISTED PULMONARY SVC/PX: CPT

## 2021-06-14 PROCEDURE — 63710000001 INSULIN DETEMIR PER 5 UNITS: Performed by: INTERNAL MEDICINE

## 2021-06-14 PROCEDURE — 63710000001 INSULIN LISPRO (HUMAN) PER 5 UNITS: Performed by: INTERNAL MEDICINE

## 2021-06-14 PROCEDURE — 80048 BASIC METABOLIC PNL TOTAL CA: CPT | Performed by: INTERNAL MEDICINE

## 2021-06-14 PROCEDURE — 82962 GLUCOSE BLOOD TEST: CPT

## 2021-06-14 PROCEDURE — 25010000002 CEFTRIAXONE PER 250 MG: Performed by: INTERNAL MEDICINE

## 2021-06-14 PROCEDURE — 97605 NEG PRS WND THER DME<=50SQCM: CPT

## 2021-06-14 PROCEDURE — 25010000002 HEPARIN (PORCINE) PER 1000 UNITS: Performed by: INTERNAL MEDICINE

## 2021-06-14 PROCEDURE — 99233 SBSQ HOSP IP/OBS HIGH 50: CPT | Performed by: INTERNAL MEDICINE

## 2021-06-14 PROCEDURE — 25010000002 FUROSEMIDE PER 20 MG: Performed by: INTERNAL MEDICINE

## 2021-06-14 PROCEDURE — 99232 SBSQ HOSP IP/OBS MODERATE 35: CPT | Performed by: INTERNAL MEDICINE

## 2021-06-14 PROCEDURE — 97597 DBRDMT OPN WND 1ST 20 CM/<: CPT

## 2021-06-14 RX ADMIN — SODIUM CHLORIDE, PRESERVATIVE FREE 10 ML: 5 INJECTION INTRAVENOUS at 22:05

## 2021-06-14 RX ADMIN — INSULIN LISPRO 2 UNITS: 100 INJECTION, SOLUTION INTRAVENOUS; SUBCUTANEOUS at 09:26

## 2021-06-14 RX ADMIN — HYDROCODONE BITARTRATE AND ACETAMINOPHEN 1 TABLET: 5; 325 TABLET ORAL at 22:06

## 2021-06-14 RX ADMIN — INSULIN LISPRO 8 UNITS: 100 INJECTION, SOLUTION INTRAVENOUS; SUBCUTANEOUS at 09:25

## 2021-06-14 RX ADMIN — PREGABALIN 100 MG: 100 CAPSULE ORAL at 09:24

## 2021-06-14 RX ADMIN — IPRATROPIUM BROMIDE AND ALBUTEROL SULFATE 3 ML: 2.5; .5 SOLUTION RESPIRATORY (INHALATION) at 07:30

## 2021-06-14 RX ADMIN — POTASSIUM CHLORIDE 60 MEQ: 750 CAPSULE, EXTENDED RELEASE ORAL at 09:23

## 2021-06-14 RX ADMIN — INSULIN DETEMIR 20 UNITS: 100 INJECTION, SOLUTION SUBCUTANEOUS at 22:10

## 2021-06-14 RX ADMIN — INSULIN LISPRO 5 UNITS: 100 INJECTION, SOLUTION INTRAVENOUS; SUBCUTANEOUS at 18:23

## 2021-06-14 RX ADMIN — ISOSORBIDE DINITRATE: 20 TABLET ORAL at 13:27

## 2021-06-14 RX ADMIN — FUROSEMIDE 40 MG: 10 INJECTION, SOLUTION INTRAMUSCULAR; INTRAVENOUS at 06:42

## 2021-06-14 RX ADMIN — HEPARIN SODIUM 5000 UNITS: 5000 INJECTION INTRAVENOUS; SUBCUTANEOUS at 13:29

## 2021-06-14 RX ADMIN — SODIUM CHLORIDE 2 G: 900 INJECTION INTRAVENOUS at 00:03

## 2021-06-14 RX ADMIN — INSULIN LISPRO 3 UNITS: 100 INJECTION, SOLUTION INTRAVENOUS; SUBCUTANEOUS at 13:32

## 2021-06-14 RX ADMIN — SODIUM CHLORIDE, PRESERVATIVE FREE 10 ML: 5 INJECTION INTRAVENOUS at 09:25

## 2021-06-14 RX ADMIN — Medication 250 MG: at 22:06

## 2021-06-14 RX ADMIN — CARVEDILOL 12.5 MG: 12.5 TABLET, FILM COATED ORAL at 09:23

## 2021-06-14 RX ADMIN — HEPARIN SODIUM 5000 UNITS: 5000 INJECTION INTRAVENOUS; SUBCUTANEOUS at 22:07

## 2021-06-14 RX ADMIN — ALLOPURINOL 100 MG: 100 TABLET ORAL at 09:24

## 2021-06-14 RX ADMIN — HEPARIN SODIUM 5000 UNITS: 5000 INJECTION INTRAVENOUS; SUBCUTANEOUS at 06:42

## 2021-06-14 RX ADMIN — INSULIN LISPRO 8 UNITS: 100 INJECTION, SOLUTION INTRAVENOUS; SUBCUTANEOUS at 13:28

## 2021-06-14 RX ADMIN — HYDROXYZINE HYDROCHLORIDE 25 MG: 25 TABLET, FILM COATED ORAL at 22:06

## 2021-06-14 RX ADMIN — CARVEDILOL 12.5 MG: 12.5 TABLET, FILM COATED ORAL at 18:24

## 2021-06-14 RX ADMIN — INSULIN DETEMIR 20 UNITS: 100 INJECTION, SOLUTION SUBCUTANEOUS at 09:24

## 2021-06-14 RX ADMIN — ATORVASTATIN CALCIUM 80 MG: 40 TABLET, FILM COATED ORAL at 22:06

## 2021-06-14 RX ADMIN — Medication 250 MG: at 09:24

## 2021-06-14 RX ADMIN — ISOSORBIDE DINITRATE: 20 TABLET ORAL at 22:06

## 2021-06-14 RX ADMIN — FUROSEMIDE 40 MG: 10 INJECTION, SOLUTION INTRAMUSCULAR; INTRAVENOUS at 18:23

## 2021-06-14 RX ADMIN — IPRATROPIUM BROMIDE AND ALBUTEROL SULFATE 3 ML: 2.5; .5 SOLUTION RESPIRATORY (INHALATION) at 20:14

## 2021-06-14 RX ADMIN — INSULIN LISPRO 8 UNITS: 100 INJECTION, SOLUTION INTRAVENOUS; SUBCUTANEOUS at 18:24

## 2021-06-14 RX ADMIN — ASPIRIN 81 MG: 81 TABLET, COATED ORAL at 09:23

## 2021-06-14 RX ADMIN — SODIUM CHLORIDE 2 G: 900 INJECTION INTRAVENOUS at 22:07

## 2021-06-14 RX ADMIN — ISOSORBIDE DINITRATE: 20 TABLET ORAL at 06:42

## 2021-06-15 LAB
ANION GAP SERPL CALCULATED.3IONS-SCNC: 9 MMOL/L (ref 5–15)
BUN SERPL-MCNC: 33 MG/DL (ref 8–23)
BUN/CREAT SERPL: 32 (ref 7–25)
CA-I SERPL ISE-MCNC: 1.2 MMOL/L (ref 1.12–1.32)
CALCIUM SPEC-SCNC: 8.6 MG/DL (ref 8.6–10.5)
CHLORIDE SERPL-SCNC: 96 MMOL/L (ref 98–107)
CO2 SERPL-SCNC: 30 MMOL/L (ref 22–29)
CREAT SERPL-MCNC: 1.03 MG/DL (ref 0.57–1)
DEPRECATED RDW RBC AUTO: 49.8 FL (ref 37–54)
ERYTHROCYTE [DISTWIDTH] IN BLOOD BY AUTOMATED COUNT: 14.6 % (ref 12.3–15.4)
GFR SERPL CREATININE-BSD FRML MDRD: 51 ML/MIN/1.73
GLUCOSE BLDC GLUCOMTR-MCNC: 120 MG/DL (ref 70–130)
GLUCOSE BLDC GLUCOMTR-MCNC: 169 MG/DL (ref 70–130)
GLUCOSE BLDC GLUCOMTR-MCNC: 193 MG/DL (ref 70–130)
GLUCOSE BLDC GLUCOMTR-MCNC: 250 MG/DL (ref 70–130)
GLUCOSE SERPL-MCNC: 167 MG/DL (ref 65–99)
HCT VFR BLD AUTO: 33 % (ref 34–46.6)
HGB BLD-MCNC: 10 G/DL (ref 12–15.9)
MAGNESIUM SERPL-MCNC: 1.6 MG/DL (ref 1.6–2.4)
MCH RBC QN AUTO: 28.8 PG (ref 26.6–33)
MCHC RBC AUTO-ENTMCNC: 30.3 G/DL (ref 31.5–35.7)
MCV RBC AUTO: 95.1 FL (ref 79–97)
PHOSPHATE SERPL-MCNC: 3.8 MG/DL (ref 2.5–4.5)
PLATELET # BLD AUTO: 285 10*3/MM3 (ref 140–450)
PMV BLD AUTO: 11.4 FL (ref 6–12)
POTASSIUM SERPL-SCNC: 3.9 MMOL/L (ref 3.5–5.2)
RBC # BLD AUTO: 3.47 10*6/MM3 (ref 3.77–5.28)
SODIUM SERPL-SCNC: 135 MMOL/L (ref 136–145)
WBC # BLD AUTO: 7.7 10*3/MM3 (ref 3.4–10.8)

## 2021-06-15 PROCEDURE — 80048 BASIC METABOLIC PNL TOTAL CA: CPT | Performed by: INTERNAL MEDICINE

## 2021-06-15 PROCEDURE — 97110 THERAPEUTIC EXERCISES: CPT

## 2021-06-15 PROCEDURE — 94799 UNLISTED PULMONARY SVC/PX: CPT

## 2021-06-15 PROCEDURE — 82962 GLUCOSE BLOOD TEST: CPT

## 2021-06-15 PROCEDURE — 25010000002 HEPARIN (PORCINE) PER 1000 UNITS: Performed by: INTERNAL MEDICINE

## 2021-06-15 PROCEDURE — 82330 ASSAY OF CALCIUM: CPT | Performed by: INTERNAL MEDICINE

## 2021-06-15 PROCEDURE — 97530 THERAPEUTIC ACTIVITIES: CPT

## 2021-06-15 PROCEDURE — 63710000001 INSULIN LISPRO (HUMAN) PER 5 UNITS: Performed by: INTERNAL MEDICINE

## 2021-06-15 PROCEDURE — 25010000002 FUROSEMIDE PER 20 MG: Performed by: INTERNAL MEDICINE

## 2021-06-15 PROCEDURE — 99024 POSTOP FOLLOW-UP VISIT: CPT | Performed by: THORACIC SURGERY (CARDIOTHORACIC VASCULAR SURGERY)

## 2021-06-15 PROCEDURE — 83735 ASSAY OF MAGNESIUM: CPT | Performed by: INTERNAL MEDICINE

## 2021-06-15 PROCEDURE — 84100 ASSAY OF PHOSPHORUS: CPT | Performed by: INTERNAL MEDICINE

## 2021-06-15 PROCEDURE — 99232 SBSQ HOSP IP/OBS MODERATE 35: CPT | Performed by: INTERNAL MEDICINE

## 2021-06-15 PROCEDURE — 63710000001 INSULIN DETEMIR PER 5 UNITS: Performed by: INTERNAL MEDICINE

## 2021-06-15 PROCEDURE — 25010000003 MAGNESIUM SULFATE 4 GM/100ML SOLUTION: Performed by: INTERNAL MEDICINE

## 2021-06-15 PROCEDURE — 97605 NEG PRS WND THER DME<=50SQCM: CPT

## 2021-06-15 PROCEDURE — 85027 COMPLETE CBC AUTOMATED: CPT | Performed by: INTERNAL MEDICINE

## 2021-06-15 PROCEDURE — 25010000002 CEFTRIAXONE PER 250 MG: Performed by: INTERNAL MEDICINE

## 2021-06-15 RX ORDER — MAGNESIUM SULFATE HEPTAHYDRATE 40 MG/ML
4 INJECTION, SOLUTION INTRAVENOUS AS NEEDED
Status: DISCONTINUED | OUTPATIENT
Start: 2021-06-15 | End: 2021-06-26 | Stop reason: HOSPADM

## 2021-06-15 RX ORDER — MAGNESIUM SULFATE HEPTAHYDRATE 40 MG/ML
2 INJECTION, SOLUTION INTRAVENOUS AS NEEDED
Status: DISCONTINUED | OUTPATIENT
Start: 2021-06-15 | End: 2021-06-26 | Stop reason: HOSPADM

## 2021-06-15 RX ADMIN — DOCUSATE SODIUM 50MG AND SENNOSIDES 8.6MG 2 TABLET: 8.6; 5 TABLET, FILM COATED ORAL at 09:47

## 2021-06-15 RX ADMIN — IPRATROPIUM BROMIDE AND ALBUTEROL SULFATE 3 ML: 2.5; .5 SOLUTION RESPIRATORY (INHALATION) at 07:07

## 2021-06-15 RX ADMIN — INSULIN LISPRO 2 UNITS: 100 INJECTION, SOLUTION INTRAVENOUS; SUBCUTANEOUS at 09:38

## 2021-06-15 RX ADMIN — Medication 250 MG: at 21:35

## 2021-06-15 RX ADMIN — IPRATROPIUM BROMIDE AND ALBUTEROL SULFATE 3 ML: 2.5; .5 SOLUTION RESPIRATORY (INHALATION) at 16:27

## 2021-06-15 RX ADMIN — SODIUM CHLORIDE, PRESERVATIVE FREE 10 ML: 5 INJECTION INTRAVENOUS at 21:35

## 2021-06-15 RX ADMIN — ATORVASTATIN CALCIUM 80 MG: 40 TABLET, FILM COATED ORAL at 21:34

## 2021-06-15 RX ADMIN — HEPARIN SODIUM 5000 UNITS: 5000 INJECTION INTRAVENOUS; SUBCUTANEOUS at 21:35

## 2021-06-15 RX ADMIN — INSULIN LISPRO 8 UNITS: 100 INJECTION, SOLUTION INTRAVENOUS; SUBCUTANEOUS at 13:19

## 2021-06-15 RX ADMIN — SODIUM CHLORIDE 2 G: 900 INJECTION INTRAVENOUS at 23:50

## 2021-06-15 RX ADMIN — INSULIN LISPRO 2 UNITS: 100 INJECTION, SOLUTION INTRAVENOUS; SUBCUTANEOUS at 13:19

## 2021-06-15 RX ADMIN — POTASSIUM CHLORIDE 60 MEQ: 750 CAPSULE, EXTENDED RELEASE ORAL at 09:38

## 2021-06-15 RX ADMIN — Medication 250 MG: at 09:48

## 2021-06-15 RX ADMIN — MAGNESIUM SULFATE 4 G: 4 INJECTION INTRAVENOUS at 11:46

## 2021-06-15 RX ADMIN — SODIUM CHLORIDE, PRESERVATIVE FREE 10 ML: 5 INJECTION INTRAVENOUS at 09:49

## 2021-06-15 RX ADMIN — FUROSEMIDE 40 MG: 10 INJECTION, SOLUTION INTRAMUSCULAR; INTRAVENOUS at 06:14

## 2021-06-15 RX ADMIN — ASPIRIN 81 MG: 81 TABLET, COATED ORAL at 09:38

## 2021-06-15 RX ADMIN — INSULIN LISPRO 8 UNITS: 100 INJECTION, SOLUTION INTRAVENOUS; SUBCUTANEOUS at 18:09

## 2021-06-15 RX ADMIN — IPRATROPIUM BROMIDE AND ALBUTEROL SULFATE 3 ML: 2.5; .5 SOLUTION RESPIRATORY (INHALATION) at 19:45

## 2021-06-15 RX ADMIN — FUROSEMIDE 40 MG: 10 INJECTION, SOLUTION INTRAMUSCULAR; INTRAVENOUS at 18:09

## 2021-06-15 RX ADMIN — INSULIN DETEMIR 20 UNITS: 100 INJECTION, SOLUTION SUBCUTANEOUS at 21:34

## 2021-06-15 RX ADMIN — HEPARIN SODIUM 5000 UNITS: 5000 INJECTION INTRAVENOUS; SUBCUTANEOUS at 06:14

## 2021-06-15 RX ADMIN — INSULIN LISPRO 8 UNITS: 100 INJECTION, SOLUTION INTRAVENOUS; SUBCUTANEOUS at 09:48

## 2021-06-15 RX ADMIN — ISOSORBIDE DINITRATE: 20 TABLET ORAL at 13:18

## 2021-06-15 RX ADMIN — CARVEDILOL 12.5 MG: 12.5 TABLET, FILM COATED ORAL at 18:09

## 2021-06-15 RX ADMIN — PREGABALIN 100 MG: 100 CAPSULE ORAL at 09:38

## 2021-06-15 RX ADMIN — ISOSORBIDE DINITRATE: 20 TABLET ORAL at 06:14

## 2021-06-15 RX ADMIN — HYDROCODONE BITARTRATE AND ACETAMINOPHEN 1 TABLET: 5; 325 TABLET ORAL at 06:14

## 2021-06-15 RX ADMIN — HYDROXYZINE HYDROCHLORIDE 25 MG: 25 TABLET, FILM COATED ORAL at 06:14

## 2021-06-15 RX ADMIN — DOCUSATE SODIUM 50MG AND SENNOSIDES 8.6MG 2 TABLET: 8.6; 5 TABLET, FILM COATED ORAL at 21:34

## 2021-06-15 RX ADMIN — ISOSORBIDE DINITRATE: 20 TABLET ORAL at 21:34

## 2021-06-15 RX ADMIN — CARVEDILOL 12.5 MG: 12.5 TABLET, FILM COATED ORAL at 09:38

## 2021-06-15 RX ADMIN — HEPARIN SODIUM 5000 UNITS: 5000 INJECTION INTRAVENOUS; SUBCUTANEOUS at 13:18

## 2021-06-15 RX ADMIN — INSULIN DETEMIR 20 UNITS: 100 INJECTION, SOLUTION SUBCUTANEOUS at 09:48

## 2021-06-15 RX ADMIN — ALLOPURINOL 100 MG: 100 TABLET ORAL at 09:38

## 2021-06-16 LAB
ANION GAP SERPL CALCULATED.3IONS-SCNC: 6 MMOL/L (ref 5–15)
BUN SERPL-MCNC: 33 MG/DL (ref 8–23)
BUN/CREAT SERPL: 33.7 (ref 7–25)
CALCIUM SPEC-SCNC: 8.6 MG/DL (ref 8.6–10.5)
CHLORIDE SERPL-SCNC: 100 MMOL/L (ref 98–107)
CO2 SERPL-SCNC: 31 MMOL/L (ref 22–29)
CREAT SERPL-MCNC: 0.98 MG/DL (ref 0.57–1)
DEPRECATED RDW RBC AUTO: 51.2 FL (ref 37–54)
ERYTHROCYTE [DISTWIDTH] IN BLOOD BY AUTOMATED COUNT: 14.7 % (ref 12.3–15.4)
GFR SERPL CREATININE-BSD FRML MDRD: 54 ML/MIN/1.73
GLUCOSE BLDC GLUCOMTR-MCNC: 114 MG/DL (ref 70–130)
GLUCOSE BLDC GLUCOMTR-MCNC: 135 MG/DL (ref 70–130)
GLUCOSE BLDC GLUCOMTR-MCNC: 168 MG/DL (ref 70–130)
GLUCOSE BLDC GLUCOMTR-MCNC: 184 MG/DL (ref 70–130)
GLUCOSE SERPL-MCNC: 110 MG/DL (ref 65–99)
HCT VFR BLD AUTO: 29.4 % (ref 34–46.6)
HGB BLD-MCNC: 9 G/DL (ref 12–15.9)
MAGNESIUM SERPL-MCNC: 2.5 MG/DL (ref 1.6–2.4)
MCH RBC QN AUTO: 29.1 PG (ref 26.6–33)
MCHC RBC AUTO-ENTMCNC: 30.6 G/DL (ref 31.5–35.7)
MCV RBC AUTO: 95.1 FL (ref 79–97)
PLATELET # BLD AUTO: 279 10*3/MM3 (ref 140–450)
PMV BLD AUTO: 11 FL (ref 6–12)
POTASSIUM SERPL-SCNC: 4.2 MMOL/L (ref 3.5–5.2)
RBC # BLD AUTO: 3.09 10*6/MM3 (ref 3.77–5.28)
SODIUM SERPL-SCNC: 137 MMOL/L (ref 136–145)
WBC # BLD AUTO: 7.69 10*3/MM3 (ref 3.4–10.8)

## 2021-06-16 PROCEDURE — 80048 BASIC METABOLIC PNL TOTAL CA: CPT | Performed by: INTERNAL MEDICINE

## 2021-06-16 PROCEDURE — 97605 NEG PRS WND THER DME<=50SQCM: CPT

## 2021-06-16 PROCEDURE — 85027 COMPLETE CBC AUTOMATED: CPT | Performed by: INTERNAL MEDICINE

## 2021-06-16 PROCEDURE — 63710000001 INSULIN LISPRO (HUMAN) PER 5 UNITS: Performed by: INTERNAL MEDICINE

## 2021-06-16 PROCEDURE — 25010000002 FUROSEMIDE PER 20 MG: Performed by: INTERNAL MEDICINE

## 2021-06-16 PROCEDURE — 25010000002 CEFTRIAXONE PER 250 MG: Performed by: INTERNAL MEDICINE

## 2021-06-16 PROCEDURE — 83735 ASSAY OF MAGNESIUM: CPT | Performed by: INTERNAL MEDICINE

## 2021-06-16 PROCEDURE — 82962 GLUCOSE BLOOD TEST: CPT

## 2021-06-16 PROCEDURE — 94799 UNLISTED PULMONARY SVC/PX: CPT

## 2021-06-16 PROCEDURE — 25010000002 HEPARIN (PORCINE) PER 1000 UNITS: Performed by: INTERNAL MEDICINE

## 2021-06-16 PROCEDURE — 97597 DBRDMT OPN WND 1ST 20 CM/<: CPT

## 2021-06-16 PROCEDURE — 63710000001 INSULIN DETEMIR PER 5 UNITS: Performed by: INTERNAL MEDICINE

## 2021-06-16 PROCEDURE — 99232 SBSQ HOSP IP/OBS MODERATE 35: CPT | Performed by: INTERNAL MEDICINE

## 2021-06-16 RX ORDER — TORSEMIDE 20 MG/1
40 TABLET ORAL DAILY
Status: DISCONTINUED | OUTPATIENT
Start: 2021-06-17 | End: 2021-06-21

## 2021-06-16 RX ADMIN — INSULIN LISPRO 2 UNITS: 100 INJECTION, SOLUTION INTRAVENOUS; SUBCUTANEOUS at 12:09

## 2021-06-16 RX ADMIN — IPRATROPIUM BROMIDE AND ALBUTEROL SULFATE 3 ML: 2.5; .5 SOLUTION RESPIRATORY (INHALATION) at 20:04

## 2021-06-16 RX ADMIN — DOCUSATE SODIUM 50MG AND SENNOSIDES 8.6MG 2 TABLET: 8.6; 5 TABLET, FILM COATED ORAL at 08:12

## 2021-06-16 RX ADMIN — INSULIN LISPRO 8 UNITS: 100 INJECTION, SOLUTION INTRAVENOUS; SUBCUTANEOUS at 17:08

## 2021-06-16 RX ADMIN — ATORVASTATIN CALCIUM 80 MG: 40 TABLET, FILM COATED ORAL at 22:24

## 2021-06-16 RX ADMIN — FUROSEMIDE 40 MG: 10 INJECTION, SOLUTION INTRAMUSCULAR; INTRAVENOUS at 06:40

## 2021-06-16 RX ADMIN — INSULIN DETEMIR 20 UNITS: 100 INJECTION, SOLUTION SUBCUTANEOUS at 22:25

## 2021-06-16 RX ADMIN — INSULIN DETEMIR 20 UNITS: 100 INJECTION, SOLUTION SUBCUTANEOUS at 09:20

## 2021-06-16 RX ADMIN — IPRATROPIUM BROMIDE AND ALBUTEROL SULFATE 3 ML: 2.5; .5 SOLUTION RESPIRATORY (INHALATION) at 16:30

## 2021-06-16 RX ADMIN — ISOSORBIDE DINITRATE: 20 TABLET ORAL at 22:24

## 2021-06-16 RX ADMIN — ISOSORBIDE DINITRATE: 20 TABLET ORAL at 06:40

## 2021-06-16 RX ADMIN — CARVEDILOL 12.5 MG: 12.5 TABLET, FILM COATED ORAL at 08:13

## 2021-06-16 RX ADMIN — HEPARIN SODIUM 5000 UNITS: 5000 INJECTION INTRAVENOUS; SUBCUTANEOUS at 22:25

## 2021-06-16 RX ADMIN — ASPIRIN 81 MG: 81 TABLET, COATED ORAL at 08:13

## 2021-06-16 RX ADMIN — ACETAMINOPHEN 650 MG: 325 TABLET ORAL at 15:25

## 2021-06-16 RX ADMIN — INSULIN LISPRO 8 UNITS: 100 INJECTION, SOLUTION INTRAVENOUS; SUBCUTANEOUS at 12:08

## 2021-06-16 RX ADMIN — SODIUM CHLORIDE 2 G: 900 INJECTION INTRAVENOUS at 22:25

## 2021-06-16 RX ADMIN — IPRATROPIUM BROMIDE AND ALBUTEROL SULFATE 3 ML: 2.5; .5 SOLUTION RESPIRATORY (INHALATION) at 06:54

## 2021-06-16 RX ADMIN — Medication 250 MG: at 22:24

## 2021-06-16 RX ADMIN — ALLOPURINOL 100 MG: 100 TABLET ORAL at 08:12

## 2021-06-16 RX ADMIN — CARVEDILOL 12.5 MG: 12.5 TABLET, FILM COATED ORAL at 17:07

## 2021-06-16 RX ADMIN — PREGABALIN 100 MG: 100 CAPSULE ORAL at 08:12

## 2021-06-16 RX ADMIN — SODIUM CHLORIDE, PRESERVATIVE FREE 10 ML: 5 INJECTION INTRAVENOUS at 22:26

## 2021-06-16 RX ADMIN — POTASSIUM CHLORIDE 60 MEQ: 750 CAPSULE, EXTENDED RELEASE ORAL at 08:12

## 2021-06-16 RX ADMIN — DOCUSATE SODIUM 50MG AND SENNOSIDES 8.6MG 2 TABLET: 8.6; 5 TABLET, FILM COATED ORAL at 22:24

## 2021-06-16 RX ADMIN — HEPARIN SODIUM 5000 UNITS: 5000 INJECTION INTRAVENOUS; SUBCUTANEOUS at 06:40

## 2021-06-16 RX ADMIN — Medication 250 MG: at 08:12

## 2021-06-16 RX ADMIN — INSULIN LISPRO 8 UNITS: 100 INJECTION, SOLUTION INTRAVENOUS; SUBCUTANEOUS at 09:20

## 2021-06-16 RX ADMIN — HEPARIN SODIUM 5000 UNITS: 5000 INJECTION INTRAVENOUS; SUBCUTANEOUS at 13:18

## 2021-06-16 RX ADMIN — ISOSORBIDE DINITRATE: 20 TABLET ORAL at 13:18

## 2021-06-17 LAB
ANION GAP SERPL CALCULATED.3IONS-SCNC: 9 MMOL/L (ref 5–15)
BUN SERPL-MCNC: 34 MG/DL (ref 8–23)
BUN/CREAT SERPL: 34 (ref 7–25)
CALCIUM SPEC-SCNC: 9 MG/DL (ref 8.6–10.5)
CHLORIDE SERPL-SCNC: 99 MMOL/L (ref 98–107)
CO2 SERPL-SCNC: 28 MMOL/L (ref 22–29)
CREAT SERPL-MCNC: 1 MG/DL (ref 0.57–1)
DEPRECATED RDW RBC AUTO: 52.1 FL (ref 37–54)
ERYTHROCYTE [DISTWIDTH] IN BLOOD BY AUTOMATED COUNT: 15 % (ref 12.3–15.4)
GFR SERPL CREATININE-BSD FRML MDRD: 53 ML/MIN/1.73
GLUCOSE BLDC GLUCOMTR-MCNC: 201 MG/DL (ref 70–130)
GLUCOSE BLDC GLUCOMTR-MCNC: 256 MG/DL (ref 70–130)
GLUCOSE BLDC GLUCOMTR-MCNC: 271 MG/DL (ref 70–130)
GLUCOSE BLDC GLUCOMTR-MCNC: 307 MG/DL (ref 70–130)
GLUCOSE SERPL-MCNC: 193 MG/DL (ref 65–99)
HCT VFR BLD AUTO: 29.5 % (ref 34–46.6)
HGB BLD-MCNC: 9 G/DL (ref 12–15.9)
MCH RBC QN AUTO: 29.4 PG (ref 26.6–33)
MCHC RBC AUTO-ENTMCNC: 30.5 G/DL (ref 31.5–35.7)
MCV RBC AUTO: 96.4 FL (ref 79–97)
PLATELET # BLD AUTO: 282 10*3/MM3 (ref 140–450)
PMV BLD AUTO: 11.1 FL (ref 6–12)
POTASSIUM SERPL-SCNC: 4.6 MMOL/L (ref 3.5–5.2)
QT INTERVAL: 414 MS
QTC INTERVAL: 453 MS
RBC # BLD AUTO: 3.06 10*6/MM3 (ref 3.77–5.28)
SODIUM SERPL-SCNC: 136 MMOL/L (ref 136–145)
TROPONIN T SERPL-MCNC: 0.23 NG/ML (ref 0–0.03)
WBC # BLD AUTO: 6.89 10*3/MM3 (ref 3.4–10.8)

## 2021-06-17 PROCEDURE — 84484 ASSAY OF TROPONIN QUANT: CPT | Performed by: INTERNAL MEDICINE

## 2021-06-17 PROCEDURE — 94799 UNLISTED PULMONARY SVC/PX: CPT

## 2021-06-17 PROCEDURE — 93010 ELECTROCARDIOGRAM REPORT: CPT | Performed by: INTERNAL MEDICINE

## 2021-06-17 PROCEDURE — 25010000002 HEPARIN (PORCINE) PER 1000 UNITS: Performed by: INTERNAL MEDICINE

## 2021-06-17 PROCEDURE — 82962 GLUCOSE BLOOD TEST: CPT

## 2021-06-17 PROCEDURE — 80048 BASIC METABOLIC PNL TOTAL CA: CPT | Performed by: INTERNAL MEDICINE

## 2021-06-17 PROCEDURE — 93005 ELECTROCARDIOGRAM TRACING: CPT | Performed by: INTERNAL MEDICINE

## 2021-06-17 PROCEDURE — 85027 COMPLETE CBC AUTOMATED: CPT | Performed by: INTERNAL MEDICINE

## 2021-06-17 PROCEDURE — 63710000001 INSULIN LISPRO (HUMAN) PER 5 UNITS: Performed by: INTERNAL MEDICINE

## 2021-06-17 PROCEDURE — 63710000001 INSULIN DETEMIR PER 5 UNITS: Performed by: INTERNAL MEDICINE

## 2021-06-17 PROCEDURE — 99232 SBSQ HOSP IP/OBS MODERATE 35: CPT | Performed by: INTERNAL MEDICINE

## 2021-06-17 PROCEDURE — 25010000002 CEFTRIAXONE PER 250 MG: Performed by: INTERNAL MEDICINE

## 2021-06-17 PROCEDURE — 99024 POSTOP FOLLOW-UP VISIT: CPT | Performed by: THORACIC SURGERY (CARDIOTHORACIC VASCULAR SURGERY)

## 2021-06-17 RX ADMIN — INSULIN LISPRO 4 UNITS: 100 INJECTION, SOLUTION INTRAVENOUS; SUBCUTANEOUS at 18:03

## 2021-06-17 RX ADMIN — HEPARIN SODIUM 5000 UNITS: 5000 INJECTION INTRAVENOUS; SUBCUTANEOUS at 13:58

## 2021-06-17 RX ADMIN — Medication 250 MG: at 10:34

## 2021-06-17 RX ADMIN — HEPARIN SODIUM 5000 UNITS: 5000 INJECTION INTRAVENOUS; SUBCUTANEOUS at 06:22

## 2021-06-17 RX ADMIN — CARVEDILOL 12.5 MG: 12.5 TABLET, FILM COATED ORAL at 17:58

## 2021-06-17 RX ADMIN — INSULIN LISPRO 8 UNITS: 100 INJECTION, SOLUTION INTRAVENOUS; SUBCUTANEOUS at 18:03

## 2021-06-17 RX ADMIN — HYDROXYZINE HYDROCHLORIDE 25 MG: 25 TABLET, FILM COATED ORAL at 23:28

## 2021-06-17 RX ADMIN — INSULIN LISPRO 2 UNITS: 100 INJECTION, SOLUTION INTRAVENOUS; SUBCUTANEOUS at 10:36

## 2021-06-17 RX ADMIN — CARVEDILOL 12.5 MG: 12.5 TABLET, FILM COATED ORAL at 10:34

## 2021-06-17 RX ADMIN — IPRATROPIUM BROMIDE AND ALBUTEROL SULFATE 3 ML: 2.5; .5 SOLUTION RESPIRATORY (INHALATION) at 13:03

## 2021-06-17 RX ADMIN — ACETAMINOPHEN 650 MG: 325 TABLET ORAL at 06:25

## 2021-06-17 RX ADMIN — Medication 250 MG: at 23:27

## 2021-06-17 RX ADMIN — ISOSORBIDE DINITRATE: 20 TABLET ORAL at 13:59

## 2021-06-17 RX ADMIN — ISOSORBIDE DINITRATE: 20 TABLET ORAL at 06:22

## 2021-06-17 RX ADMIN — SODIUM CHLORIDE 2 G: 900 INJECTION INTRAVENOUS at 23:36

## 2021-06-17 RX ADMIN — ASPIRIN 81 MG: 81 TABLET, COATED ORAL at 10:33

## 2021-06-17 RX ADMIN — INSULIN DETEMIR 20 UNITS: 100 INJECTION, SOLUTION SUBCUTANEOUS at 10:47

## 2021-06-17 RX ADMIN — TORSEMIDE 40 MG: 20 TABLET ORAL at 10:34

## 2021-06-17 RX ADMIN — HEPARIN SODIUM 5000 UNITS: 5000 INJECTION INTRAVENOUS; SUBCUTANEOUS at 23:27

## 2021-06-17 RX ADMIN — POTASSIUM CHLORIDE 60 MEQ: 750 CAPSULE, EXTENDED RELEASE ORAL at 10:33

## 2021-06-17 RX ADMIN — IPRATROPIUM BROMIDE AND ALBUTEROL SULFATE 3 ML: 2.5; .5 SOLUTION RESPIRATORY (INHALATION) at 16:04

## 2021-06-17 RX ADMIN — INSULIN DETEMIR 20 UNITS: 100 INJECTION, SOLUTION SUBCUTANEOUS at 23:28

## 2021-06-17 RX ADMIN — PREGABALIN 100 MG: 100 CAPSULE ORAL at 10:34

## 2021-06-17 RX ADMIN — INSULIN LISPRO 5 UNITS: 100 INJECTION, SOLUTION INTRAVENOUS; SUBCUTANEOUS at 12:44

## 2021-06-17 RX ADMIN — IPRATROPIUM BROMIDE AND ALBUTEROL SULFATE 3 ML: 2.5; .5 SOLUTION RESPIRATORY (INHALATION) at 08:45

## 2021-06-17 RX ADMIN — SODIUM CHLORIDE, PRESERVATIVE FREE 10 ML: 5 INJECTION INTRAVENOUS at 23:28

## 2021-06-17 RX ADMIN — INSULIN LISPRO 8 UNITS: 100 INJECTION, SOLUTION INTRAVENOUS; SUBCUTANEOUS at 10:36

## 2021-06-17 RX ADMIN — ISOSORBIDE DINITRATE: 20 TABLET ORAL at 23:27

## 2021-06-17 RX ADMIN — INSULIN LISPRO 8 UNITS: 100 INJECTION, SOLUTION INTRAVENOUS; SUBCUTANEOUS at 12:44

## 2021-06-17 RX ADMIN — DOCUSATE SODIUM 50MG AND SENNOSIDES 8.6MG 2 TABLET: 8.6; 5 TABLET, FILM COATED ORAL at 10:34

## 2021-06-17 RX ADMIN — IPRATROPIUM BROMIDE AND ALBUTEROL SULFATE 3 ML: 2.5; .5 SOLUTION RESPIRATORY (INHALATION) at 19:54

## 2021-06-17 RX ADMIN — ATORVASTATIN CALCIUM 80 MG: 40 TABLET, FILM COATED ORAL at 23:27

## 2021-06-17 RX ADMIN — ALLOPURINOL 100 MG: 100 TABLET ORAL at 10:34

## 2021-06-18 ENCOUNTER — APPOINTMENT (OUTPATIENT)
Dept: GENERAL RADIOLOGY | Facility: HOSPITAL | Age: 81
End: 2021-06-18

## 2021-06-18 LAB
ANION GAP SERPL CALCULATED.3IONS-SCNC: 10 MMOL/L (ref 5–15)
BUN SERPL-MCNC: 34 MG/DL (ref 8–23)
BUN/CREAT SERPL: 31.5 (ref 7–25)
CALCIUM SPEC-SCNC: 9 MG/DL (ref 8.6–10.5)
CHLORIDE SERPL-SCNC: 100 MMOL/L (ref 98–107)
CO2 SERPL-SCNC: 26 MMOL/L (ref 22–29)
CREAT SERPL-MCNC: 1.08 MG/DL (ref 0.57–1)
DEPRECATED RDW RBC AUTO: 53.1 FL (ref 37–54)
ERYTHROCYTE [DISTWIDTH] IN BLOOD BY AUTOMATED COUNT: 14.7 % (ref 12.3–15.4)
GFR SERPL CREATININE-BSD FRML MDRD: 49 ML/MIN/1.73
GLUCOSE BLDC GLUCOMTR-MCNC: 159 MG/DL (ref 70–130)
GLUCOSE BLDC GLUCOMTR-MCNC: 195 MG/DL (ref 70–130)
GLUCOSE BLDC GLUCOMTR-MCNC: 205 MG/DL (ref 70–130)
GLUCOSE BLDC GLUCOMTR-MCNC: 301 MG/DL (ref 70–130)
GLUCOSE SERPL-MCNC: 168 MG/DL (ref 65–99)
HCT VFR BLD AUTO: 32.4 % (ref 34–46.6)
HGB BLD-MCNC: 9.7 G/DL (ref 12–15.9)
MCH RBC QN AUTO: 29.5 PG (ref 26.6–33)
MCHC RBC AUTO-ENTMCNC: 29.9 G/DL (ref 31.5–35.7)
MCV RBC AUTO: 98.5 FL (ref 79–97)
NT-PROBNP SERPL-MCNC: 4603 PG/ML (ref 0–1800)
PLATELET # BLD AUTO: 275 10*3/MM3 (ref 140–450)
PMV BLD AUTO: 10.5 FL (ref 6–12)
POTASSIUM SERPL-SCNC: 4.5 MMOL/L (ref 3.5–5.2)
RBC # BLD AUTO: 3.29 10*6/MM3 (ref 3.77–5.28)
SODIUM SERPL-SCNC: 136 MMOL/L (ref 136–145)
WBC # BLD AUTO: 6.32 10*3/MM3 (ref 3.4–10.8)

## 2021-06-18 PROCEDURE — 63710000001 INSULIN LISPRO (HUMAN) PER 5 UNITS: Performed by: INTERNAL MEDICINE

## 2021-06-18 PROCEDURE — 25010000002 CEFTRIAXONE PER 250 MG: Performed by: INTERNAL MEDICINE

## 2021-06-18 PROCEDURE — 99024 POSTOP FOLLOW-UP VISIT: CPT | Performed by: THORACIC SURGERY (CARDIOTHORACIC VASCULAR SURGERY)

## 2021-06-18 PROCEDURE — 71045 X-RAY EXAM CHEST 1 VIEW: CPT

## 2021-06-18 PROCEDURE — 80048 BASIC METABOLIC PNL TOTAL CA: CPT | Performed by: INTERNAL MEDICINE

## 2021-06-18 PROCEDURE — 63710000001 INSULIN DETEMIR PER 5 UNITS: Performed by: INTERNAL MEDICINE

## 2021-06-18 PROCEDURE — 94799 UNLISTED PULMONARY SVC/PX: CPT

## 2021-06-18 PROCEDURE — 99233 SBSQ HOSP IP/OBS HIGH 50: CPT | Performed by: INTERNAL MEDICINE

## 2021-06-18 PROCEDURE — 99232 SBSQ HOSP IP/OBS MODERATE 35: CPT | Performed by: INTERNAL MEDICINE

## 2021-06-18 PROCEDURE — 85027 COMPLETE CBC AUTOMATED: CPT | Performed by: INTERNAL MEDICINE

## 2021-06-18 PROCEDURE — 25010000002 HEPARIN (PORCINE) PER 1000 UNITS: Performed by: INTERNAL MEDICINE

## 2021-06-18 PROCEDURE — 82962 GLUCOSE BLOOD TEST: CPT

## 2021-06-18 PROCEDURE — 97110 THERAPEUTIC EXERCISES: CPT

## 2021-06-18 PROCEDURE — 83880 ASSAY OF NATRIURETIC PEPTIDE: CPT | Performed by: INTERNAL MEDICINE

## 2021-06-18 PROCEDURE — 97605 NEG PRS WND THER DME<=50SQCM: CPT

## 2021-06-18 RX ORDER — TORSEMIDE 20 MG/1
20 TABLET ORAL DAILY
Status: CANCELLED | OUTPATIENT
Start: 2021-06-19

## 2021-06-18 RX ORDER — CARVEDILOL 12.5 MG/1
12.5 TABLET ORAL 2 TIMES DAILY WITH MEALS
Status: CANCELLED | OUTPATIENT
Start: 2021-06-18

## 2021-06-18 RX ADMIN — POTASSIUM CHLORIDE 60 MEQ: 750 CAPSULE, EXTENDED RELEASE ORAL at 09:55

## 2021-06-18 RX ADMIN — HEPARIN SODIUM 5000 UNITS: 5000 INJECTION INTRAVENOUS; SUBCUTANEOUS at 15:03

## 2021-06-18 RX ADMIN — IPRATROPIUM BROMIDE AND ALBUTEROL SULFATE 3 ML: 2.5; .5 SOLUTION RESPIRATORY (INHALATION) at 06:54

## 2021-06-18 RX ADMIN — ATORVASTATIN CALCIUM 80 MG: 40 TABLET, FILM COATED ORAL at 20:54

## 2021-06-18 RX ADMIN — SODIUM CHLORIDE, PRESERVATIVE FREE 10 ML: 5 INJECTION INTRAVENOUS at 09:56

## 2021-06-18 RX ADMIN — ISOSORBIDE DINITRATE: 20 TABLET ORAL at 20:54

## 2021-06-18 RX ADMIN — INSULIN DETEMIR 20 UNITS: 100 INJECTION, SOLUTION SUBCUTANEOUS at 20:53

## 2021-06-18 RX ADMIN — SODIUM CHLORIDE 2 G: 900 INJECTION INTRAVENOUS at 22:41

## 2021-06-18 RX ADMIN — CARVEDILOL 12.5 MG: 12.5 TABLET, FILM COATED ORAL at 17:51

## 2021-06-18 RX ADMIN — ASPIRIN 81 MG: 81 TABLET, COATED ORAL at 09:56

## 2021-06-18 RX ADMIN — ISOSORBIDE DINITRATE: 20 TABLET ORAL at 15:02

## 2021-06-18 RX ADMIN — HEPARIN SODIUM 5000 UNITS: 5000 INJECTION INTRAVENOUS; SUBCUTANEOUS at 06:51

## 2021-06-18 RX ADMIN — INSULIN LISPRO 8 UNITS: 100 INJECTION, SOLUTION INTRAVENOUS; SUBCUTANEOUS at 09:57

## 2021-06-18 RX ADMIN — IPRATROPIUM BROMIDE AND ALBUTEROL SULFATE 3 ML: 2.5; .5 SOLUTION RESPIRATORY (INHALATION) at 18:53

## 2021-06-18 RX ADMIN — SODIUM CHLORIDE, PRESERVATIVE FREE 10 ML: 5 INJECTION INTRAVENOUS at 20:56

## 2021-06-18 RX ADMIN — INSULIN LISPRO 2 UNITS: 100 INJECTION, SOLUTION INTRAVENOUS; SUBCUTANEOUS at 12:32

## 2021-06-18 RX ADMIN — INSULIN LISPRO 8 UNITS: 100 INJECTION, SOLUTION INTRAVENOUS; SUBCUTANEOUS at 17:51

## 2021-06-18 RX ADMIN — CARVEDILOL 12.5 MG: 12.5 TABLET, FILM COATED ORAL at 12:32

## 2021-06-18 RX ADMIN — INSULIN DETEMIR 20 UNITS: 100 INJECTION, SOLUTION SUBCUTANEOUS at 09:00

## 2021-06-18 RX ADMIN — INSULIN LISPRO 8 UNITS: 100 INJECTION, SOLUTION INTRAVENOUS; SUBCUTANEOUS at 12:32

## 2021-06-18 RX ADMIN — IPRATROPIUM BROMIDE AND ALBUTEROL SULFATE 3 ML: 2.5; .5 SOLUTION RESPIRATORY (INHALATION) at 12:07

## 2021-06-18 RX ADMIN — ALLOPURINOL 100 MG: 100 TABLET ORAL at 09:56

## 2021-06-18 RX ADMIN — DOCUSATE SODIUM 50MG AND SENNOSIDES 8.6MG 2 TABLET: 8.6; 5 TABLET, FILM COATED ORAL at 20:54

## 2021-06-18 RX ADMIN — INSULIN LISPRO 3 UNITS: 100 INJECTION, SOLUTION INTRAVENOUS; SUBCUTANEOUS at 17:51

## 2021-06-18 RX ADMIN — Medication 250 MG: at 20:54

## 2021-06-18 RX ADMIN — TORSEMIDE 40 MG: 20 TABLET ORAL at 09:56

## 2021-06-18 RX ADMIN — PREGABALIN 100 MG: 100 CAPSULE ORAL at 09:56

## 2021-06-18 RX ADMIN — HEPARIN SODIUM 5000 UNITS: 5000 INJECTION INTRAVENOUS; SUBCUTANEOUS at 20:55

## 2021-06-18 RX ADMIN — IPRATROPIUM BROMIDE AND ALBUTEROL SULFATE 3 ML: 2.5; .5 SOLUTION RESPIRATORY (INHALATION) at 17:05

## 2021-06-18 RX ADMIN — ISOSORBIDE DINITRATE: 20 TABLET ORAL at 06:50

## 2021-06-18 RX ADMIN — Medication 250 MG: at 09:56

## 2021-06-18 RX ADMIN — INSULIN LISPRO 2 UNITS: 100 INJECTION, SOLUTION INTRAVENOUS; SUBCUTANEOUS at 09:58

## 2021-06-19 LAB
ALBUMIN SERPL-MCNC: 2.6 G/DL (ref 3.5–5.2)
ANION GAP SERPL CALCULATED.3IONS-SCNC: 8 MMOL/L (ref 5–15)
BUN SERPL-MCNC: 35 MG/DL (ref 8–23)
BUN/CREAT SERPL: 33.3 (ref 7–25)
CALCIUM SPEC-SCNC: 8.9 MG/DL (ref 8.6–10.5)
CHLORIDE SERPL-SCNC: 101 MMOL/L (ref 98–107)
CO2 SERPL-SCNC: 29 MMOL/L (ref 22–29)
CREAT SERPL-MCNC: 1.05 MG/DL (ref 0.57–1)
DEPRECATED RDW RBC AUTO: 50.8 FL (ref 37–54)
ERYTHROCYTE [DISTWIDTH] IN BLOOD BY AUTOMATED COUNT: 14.6 % (ref 12.3–15.4)
GFR SERPL CREATININE-BSD FRML MDRD: 50 ML/MIN/1.73
GLUCOSE BLDC GLUCOMTR-MCNC: 224 MG/DL (ref 70–130)
GLUCOSE BLDC GLUCOMTR-MCNC: 226 MG/DL (ref 70–130)
GLUCOSE BLDC GLUCOMTR-MCNC: 251 MG/DL (ref 70–130)
GLUCOSE BLDC GLUCOMTR-MCNC: 335 MG/DL (ref 70–130)
GLUCOSE SERPL-MCNC: 220 MG/DL (ref 65–99)
HCT VFR BLD AUTO: 30.9 % (ref 34–46.6)
HGB BLD-MCNC: 9.6 G/DL (ref 12–15.9)
MCH RBC QN AUTO: 29.7 PG (ref 26.6–33)
MCHC RBC AUTO-ENTMCNC: 31.1 G/DL (ref 31.5–35.7)
MCV RBC AUTO: 95.7 FL (ref 79–97)
PHOSPHATE SERPL-MCNC: 4.5 MG/DL (ref 2.5–4.5)
PLATELET # BLD AUTO: 303 10*3/MM3 (ref 140–450)
PMV BLD AUTO: 10.9 FL (ref 6–12)
POTASSIUM SERPL-SCNC: 4.7 MMOL/L (ref 3.5–5.2)
RBC # BLD AUTO: 3.23 10*6/MM3 (ref 3.77–5.28)
SODIUM SERPL-SCNC: 138 MMOL/L (ref 136–145)
WBC # BLD AUTO: 6.29 10*3/MM3 (ref 3.4–10.8)

## 2021-06-19 PROCEDURE — 25010000002 HEPARIN (PORCINE) PER 1000 UNITS: Performed by: INTERNAL MEDICINE

## 2021-06-19 PROCEDURE — 63710000001 INSULIN LISPRO (HUMAN) PER 5 UNITS: Performed by: INTERNAL MEDICINE

## 2021-06-19 PROCEDURE — 99024 POSTOP FOLLOW-UP VISIT: CPT | Performed by: THORACIC SURGERY (CARDIOTHORACIC VASCULAR SURGERY)

## 2021-06-19 PROCEDURE — 99232 SBSQ HOSP IP/OBS MODERATE 35: CPT | Performed by: PHYSICIAN ASSISTANT

## 2021-06-19 PROCEDURE — 80069 RENAL FUNCTION PANEL: CPT | Performed by: INTERNAL MEDICINE

## 2021-06-19 PROCEDURE — 97597 DBRDMT OPN WND 1ST 20 CM/<: CPT

## 2021-06-19 PROCEDURE — 63710000001 INSULIN DETEMIR PER 5 UNITS: Performed by: INTERNAL MEDICINE

## 2021-06-19 PROCEDURE — 85027 COMPLETE CBC AUTOMATED: CPT | Performed by: INTERNAL MEDICINE

## 2021-06-19 PROCEDURE — 25010000002 CEFTRIAXONE PER 250 MG: Performed by: INTERNAL MEDICINE

## 2021-06-19 PROCEDURE — 82962 GLUCOSE BLOOD TEST: CPT

## 2021-06-19 PROCEDURE — 94799 UNLISTED PULMONARY SVC/PX: CPT

## 2021-06-19 PROCEDURE — 99233 SBSQ HOSP IP/OBS HIGH 50: CPT | Performed by: INTERNAL MEDICINE

## 2021-06-19 PROCEDURE — 97605 NEG PRS WND THER DME<=50SQCM: CPT

## 2021-06-19 RX ADMIN — CARVEDILOL 12.5 MG: 12.5 TABLET, FILM COATED ORAL at 09:27

## 2021-06-19 RX ADMIN — ISOSORBIDE DINITRATE: 20 TABLET ORAL at 05:59

## 2021-06-19 RX ADMIN — HYDROXYZINE HYDROCHLORIDE 25 MG: 25 TABLET, FILM COATED ORAL at 21:22

## 2021-06-19 RX ADMIN — INSULIN LISPRO 8 UNITS: 100 INJECTION, SOLUTION INTRAVENOUS; SUBCUTANEOUS at 09:25

## 2021-06-19 RX ADMIN — HEPARIN SODIUM 5000 UNITS: 5000 INJECTION INTRAVENOUS; SUBCUTANEOUS at 21:23

## 2021-06-19 RX ADMIN — INSULIN LISPRO 8 UNITS: 100 INJECTION, SOLUTION INTRAVENOUS; SUBCUTANEOUS at 18:01

## 2021-06-19 RX ADMIN — TORSEMIDE 40 MG: 20 TABLET ORAL at 09:27

## 2021-06-19 RX ADMIN — ISOSORBIDE DINITRATE: 20 TABLET ORAL at 21:22

## 2021-06-19 RX ADMIN — INSULIN LISPRO 8 UNITS: 100 INJECTION, SOLUTION INTRAVENOUS; SUBCUTANEOUS at 12:31

## 2021-06-19 RX ADMIN — INSULIN DETEMIR 23 UNITS: 100 INJECTION, SOLUTION SUBCUTANEOUS at 09:24

## 2021-06-19 RX ADMIN — INSULIN LISPRO 3 UNITS: 100 INJECTION, SOLUTION INTRAVENOUS; SUBCUTANEOUS at 18:01

## 2021-06-19 RX ADMIN — POTASSIUM CHLORIDE 60 MEQ: 750 CAPSULE, EXTENDED RELEASE ORAL at 09:27

## 2021-06-19 RX ADMIN — IPRATROPIUM BROMIDE AND ALBUTEROL SULFATE 3 ML: 2.5; .5 SOLUTION RESPIRATORY (INHALATION) at 11:16

## 2021-06-19 RX ADMIN — DOCUSATE SODIUM 50MG AND SENNOSIDES 8.6MG 2 TABLET: 8.6; 5 TABLET, FILM COATED ORAL at 21:20

## 2021-06-19 RX ADMIN — SODIUM CHLORIDE, PRESERVATIVE FREE 10 ML: 5 INJECTION INTRAVENOUS at 09:28

## 2021-06-19 RX ADMIN — SODIUM CHLORIDE 2 G: 900 INJECTION INTRAVENOUS at 22:44

## 2021-06-19 RX ADMIN — INSULIN LISPRO 3 UNITS: 100 INJECTION, SOLUTION INTRAVENOUS; SUBCUTANEOUS at 09:25

## 2021-06-19 RX ADMIN — Medication 250 MG: at 21:20

## 2021-06-19 RX ADMIN — IPRATROPIUM BROMIDE AND ALBUTEROL SULFATE 3 ML: 2.5; .5 SOLUTION RESPIRATORY (INHALATION) at 16:09

## 2021-06-19 RX ADMIN — DOCUSATE SODIUM 50MG AND SENNOSIDES 8.6MG 2 TABLET: 8.6; 5 TABLET, FILM COATED ORAL at 09:27

## 2021-06-19 RX ADMIN — ISOSORBIDE DINITRATE: 20 TABLET ORAL at 15:17

## 2021-06-19 RX ADMIN — IPRATROPIUM BROMIDE AND ALBUTEROL SULFATE 3 ML: 2.5; .5 SOLUTION RESPIRATORY (INHALATION) at 07:15

## 2021-06-19 RX ADMIN — SODIUM CHLORIDE, PRESERVATIVE FREE 10 ML: 5 INJECTION INTRAVENOUS at 21:23

## 2021-06-19 RX ADMIN — HEPARIN SODIUM 5000 UNITS: 5000 INJECTION INTRAVENOUS; SUBCUTANEOUS at 15:17

## 2021-06-19 RX ADMIN — Medication 250 MG: at 09:27

## 2021-06-19 RX ADMIN — HEPARIN SODIUM 5000 UNITS: 5000 INJECTION INTRAVENOUS; SUBCUTANEOUS at 05:59

## 2021-06-19 RX ADMIN — INSULIN LISPRO 5 UNITS: 100 INJECTION, SOLUTION INTRAVENOUS; SUBCUTANEOUS at 12:31

## 2021-06-19 RX ADMIN — ALLOPURINOL 100 MG: 100 TABLET ORAL at 09:27

## 2021-06-19 RX ADMIN — ASPIRIN 81 MG: 81 TABLET, COATED ORAL at 09:27

## 2021-06-19 RX ADMIN — IPRATROPIUM BROMIDE AND ALBUTEROL SULFATE 3 ML: 2.5; .5 SOLUTION RESPIRATORY (INHALATION) at 19:15

## 2021-06-19 RX ADMIN — INSULIN DETEMIR 23 UNITS: 100 INJECTION, SOLUTION SUBCUTANEOUS at 21:24

## 2021-06-19 RX ADMIN — CARVEDILOL 12.5 MG: 12.5 TABLET, FILM COATED ORAL at 18:01

## 2021-06-19 RX ADMIN — PREGABALIN 100 MG: 100 CAPSULE ORAL at 09:27

## 2021-06-19 RX ADMIN — ATORVASTATIN CALCIUM 80 MG: 40 TABLET, FILM COATED ORAL at 21:20

## 2021-06-20 ENCOUNTER — APPOINTMENT (OUTPATIENT)
Dept: GENERAL RADIOLOGY | Facility: HOSPITAL | Age: 81
End: 2021-06-20

## 2021-06-20 LAB
ALBUMIN SERPL-MCNC: 2.5 G/DL (ref 3.5–5.2)
ANION GAP SERPL CALCULATED.3IONS-SCNC: 8 MMOL/L (ref 5–15)
BUN SERPL-MCNC: 38 MG/DL (ref 8–23)
BUN/CREAT SERPL: 38.4 (ref 7–25)
CALCIUM SPEC-SCNC: 8.6 MG/DL (ref 8.6–10.5)
CHLORIDE SERPL-SCNC: 100 MMOL/L (ref 98–107)
CO2 SERPL-SCNC: 29 MMOL/L (ref 22–29)
CREAT SERPL-MCNC: 0.99 MG/DL (ref 0.57–1)
DEPRECATED RDW RBC AUTO: 51.1 FL (ref 37–54)
ERYTHROCYTE [DISTWIDTH] IN BLOOD BY AUTOMATED COUNT: 14.6 % (ref 12.3–15.4)
GFR SERPL CREATININE-BSD FRML MDRD: 54 ML/MIN/1.73
GLUCOSE BLDC GLUCOMTR-MCNC: 192 MG/DL (ref 70–130)
GLUCOSE BLDC GLUCOMTR-MCNC: 192 MG/DL (ref 70–130)
GLUCOSE BLDC GLUCOMTR-MCNC: 249 MG/DL (ref 70–130)
GLUCOSE BLDC GLUCOMTR-MCNC: 269 MG/DL (ref 70–130)
GLUCOSE SERPL-MCNC: 188 MG/DL (ref 65–99)
HCT VFR BLD AUTO: 30.1 % (ref 34–46.6)
HGB BLD-MCNC: 9.4 G/DL (ref 12–15.9)
MCH RBC QN AUTO: 29.7 PG (ref 26.6–33)
MCHC RBC AUTO-ENTMCNC: 31.2 G/DL (ref 31.5–35.7)
MCV RBC AUTO: 95 FL (ref 79–97)
PHOSPHATE SERPL-MCNC: 4.4 MG/DL (ref 2.5–4.5)
PLATELET # BLD AUTO: 304 10*3/MM3 (ref 140–450)
PMV BLD AUTO: 10.8 FL (ref 6–12)
POTASSIUM SERPL-SCNC: 4.6 MMOL/L (ref 3.5–5.2)
RBC # BLD AUTO: 3.17 10*6/MM3 (ref 3.77–5.28)
SODIUM SERPL-SCNC: 137 MMOL/L (ref 136–145)
WBC # BLD AUTO: 6.71 10*3/MM3 (ref 3.4–10.8)

## 2021-06-20 PROCEDURE — 94799 UNLISTED PULMONARY SVC/PX: CPT

## 2021-06-20 PROCEDURE — 99233 SBSQ HOSP IP/OBS HIGH 50: CPT | Performed by: INTERNAL MEDICINE

## 2021-06-20 PROCEDURE — 63710000001 INSULIN LISPRO (HUMAN) PER 5 UNITS: Performed by: INTERNAL MEDICINE

## 2021-06-20 PROCEDURE — 80069 RENAL FUNCTION PANEL: CPT | Performed by: INTERNAL MEDICINE

## 2021-06-20 PROCEDURE — 99024 POSTOP FOLLOW-UP VISIT: CPT | Performed by: THORACIC SURGERY (CARDIOTHORACIC VASCULAR SURGERY)

## 2021-06-20 PROCEDURE — 99232 SBSQ HOSP IP/OBS MODERATE 35: CPT | Performed by: PHYSICIAN ASSISTANT

## 2021-06-20 PROCEDURE — 25010000002 CEFTRIAXONE PER 250 MG: Performed by: INTERNAL MEDICINE

## 2021-06-20 PROCEDURE — 82962 GLUCOSE BLOOD TEST: CPT

## 2021-06-20 PROCEDURE — 97110 THERAPEUTIC EXERCISES: CPT

## 2021-06-20 PROCEDURE — 25010000002 ONDANSETRON PER 1 MG: Performed by: INTERNAL MEDICINE

## 2021-06-20 PROCEDURE — 25010000002 HEPARIN (PORCINE) PER 1000 UNITS: Performed by: INTERNAL MEDICINE

## 2021-06-20 PROCEDURE — 63710000001 INSULIN DETEMIR PER 5 UNITS: Performed by: INTERNAL MEDICINE

## 2021-06-20 PROCEDURE — 71045 X-RAY EXAM CHEST 1 VIEW: CPT

## 2021-06-20 PROCEDURE — 85027 COMPLETE CBC AUTOMATED: CPT | Performed by: INTERNAL MEDICINE

## 2021-06-20 PROCEDURE — 97605 NEG PRS WND THER DME<=50SQCM: CPT

## 2021-06-20 RX ORDER — SODIUM CHLORIDE 0.9 % (FLUSH) 0.9 %
10 SYRINGE (ML) INJECTION AS NEEDED
Status: DISCONTINUED | OUTPATIENT
Start: 2021-06-20 | End: 2021-06-21 | Stop reason: HOSPADM

## 2021-06-20 RX ORDER — SODIUM CHLORIDE 0.9 % (FLUSH) 0.9 %
3 SYRINGE (ML) INJECTION EVERY 12 HOURS SCHEDULED
Status: DISCONTINUED | OUTPATIENT
Start: 2021-06-20 | End: 2021-06-21 | Stop reason: HOSPADM

## 2021-06-20 RX ORDER — SODIUM CHLORIDE 9 MG/ML
3 INJECTION, SOLUTION INTRAVENOUS CONTINUOUS
Status: ACTIVE | OUTPATIENT
Start: 2021-06-20 | End: 2021-06-20

## 2021-06-20 RX ADMIN — INSULIN LISPRO 8 UNITS: 100 INJECTION, SOLUTION INTRAVENOUS; SUBCUTANEOUS at 17:42

## 2021-06-20 RX ADMIN — HEPARIN SODIUM 5000 UNITS: 5000 INJECTION INTRAVENOUS; SUBCUTANEOUS at 06:01

## 2021-06-20 RX ADMIN — INSULIN DETEMIR 25 UNITS: 100 INJECTION, SOLUTION SUBCUTANEOUS at 09:58

## 2021-06-20 RX ADMIN — ISOSORBIDE DINITRATE: 20 TABLET ORAL at 06:00

## 2021-06-20 RX ADMIN — DOCUSATE SODIUM 50MG AND SENNOSIDES 8.6MG 2 TABLET: 8.6; 5 TABLET, FILM COATED ORAL at 09:58

## 2021-06-20 RX ADMIN — HEPARIN SODIUM 5000 UNITS: 5000 INJECTION INTRAVENOUS; SUBCUTANEOUS at 14:28

## 2021-06-20 RX ADMIN — IPRATROPIUM BROMIDE AND ALBUTEROL SULFATE 3 ML: 2.5; .5 SOLUTION RESPIRATORY (INHALATION) at 07:13

## 2021-06-20 RX ADMIN — ALLOPURINOL 100 MG: 100 TABLET ORAL at 09:58

## 2021-06-20 RX ADMIN — ATORVASTATIN CALCIUM 80 MG: 40 TABLET, FILM COATED ORAL at 21:37

## 2021-06-20 RX ADMIN — POTASSIUM CHLORIDE 60 MEQ: 750 CAPSULE, EXTENDED RELEASE ORAL at 09:58

## 2021-06-20 RX ADMIN — SODIUM CHLORIDE, PRESERVATIVE FREE 10 ML: 5 INJECTION INTRAVENOUS at 09:59

## 2021-06-20 RX ADMIN — DOCUSATE SODIUM 50MG AND SENNOSIDES 8.6MG 2 TABLET: 8.6; 5 TABLET, FILM COATED ORAL at 21:37

## 2021-06-20 RX ADMIN — ASPIRIN 81 MG: 81 TABLET, COATED ORAL at 09:58

## 2021-06-20 RX ADMIN — INSULIN DETEMIR 25 UNITS: 100 INJECTION, SOLUTION SUBCUTANEOUS at 21:42

## 2021-06-20 RX ADMIN — IPRATROPIUM BROMIDE AND ALBUTEROL SULFATE 3 ML: 2.5; .5 SOLUTION RESPIRATORY (INHALATION) at 11:21

## 2021-06-20 RX ADMIN — Medication 250 MG: at 21:36

## 2021-06-20 RX ADMIN — Medication 250 MG: at 09:58

## 2021-06-20 RX ADMIN — SODIUM CHLORIDE 2 G: 900 INJECTION INTRAVENOUS at 22:52

## 2021-06-20 RX ADMIN — INSULIN LISPRO 8 UNITS: 100 INJECTION, SOLUTION INTRAVENOUS; SUBCUTANEOUS at 12:33

## 2021-06-20 RX ADMIN — INSULIN LISPRO 4 UNITS: 100 INJECTION, SOLUTION INTRAVENOUS; SUBCUTANEOUS at 12:32

## 2021-06-20 RX ADMIN — CARVEDILOL 12.5 MG: 12.5 TABLET, FILM COATED ORAL at 17:41

## 2021-06-20 RX ADMIN — ISOSORBIDE DINITRATE: 20 TABLET ORAL at 14:29

## 2021-06-20 RX ADMIN — TORSEMIDE 40 MG: 20 TABLET ORAL at 09:58

## 2021-06-20 RX ADMIN — INSULIN LISPRO 8 UNITS: 100 INJECTION, SOLUTION INTRAVENOUS; SUBCUTANEOUS at 09:58

## 2021-06-20 RX ADMIN — PREGABALIN 100 MG: 100 CAPSULE ORAL at 09:58

## 2021-06-20 RX ADMIN — IPRATROPIUM BROMIDE AND ALBUTEROL SULFATE 3 ML: 2.5; .5 SOLUTION RESPIRATORY (INHALATION) at 19:55

## 2021-06-20 RX ADMIN — ACETAMINOPHEN 650 MG: 325 TABLET ORAL at 21:35

## 2021-06-20 RX ADMIN — ISOSORBIDE DINITRATE: 20 TABLET ORAL at 21:36

## 2021-06-20 RX ADMIN — INSULIN LISPRO 2 UNITS: 100 INJECTION, SOLUTION INTRAVENOUS; SUBCUTANEOUS at 09:57

## 2021-06-20 RX ADMIN — HEPARIN SODIUM 5000 UNITS: 5000 INJECTION INTRAVENOUS; SUBCUTANEOUS at 21:38

## 2021-06-20 RX ADMIN — CARVEDILOL 12.5 MG: 12.5 TABLET, FILM COATED ORAL at 09:59

## 2021-06-20 RX ADMIN — ONDANSETRON 4 MG: 2 INJECTION INTRAMUSCULAR; INTRAVENOUS at 06:18

## 2021-06-20 RX ADMIN — INSULIN LISPRO 3 UNITS: 100 INJECTION, SOLUTION INTRAVENOUS; SUBCUTANEOUS at 17:42

## 2021-06-20 RX ADMIN — SODIUM CHLORIDE, PRESERVATIVE FREE 10 ML: 5 INJECTION INTRAVENOUS at 21:35

## 2021-06-21 ENCOUNTER — APPOINTMENT (OUTPATIENT)
Dept: GENERAL RADIOLOGY | Facility: HOSPITAL | Age: 81
End: 2021-06-21

## 2021-06-21 PROBLEM — I25.810 CAD (CORONARY ARTERY DISEASE) OF ARTERY BYPASS GRAFT: Status: ACTIVE | Noted: 2021-06-21

## 2021-06-21 PROBLEM — I50.21 ACUTE SYSTOLIC HEART FAILURE: Status: ACTIVE | Noted: 2021-06-04

## 2021-06-21 PROBLEM — I05.9 MITRAL VALVE DISEASE: Status: ACTIVE | Noted: 2021-06-21

## 2021-06-21 LAB
ALBUMIN SERPL-MCNC: 2.6 G/DL (ref 3.5–5.2)
ANION GAP SERPL CALCULATED.3IONS-SCNC: 9 MMOL/L (ref 5–15)
BUN SERPL-MCNC: 42 MG/DL (ref 8–23)
BUN/CREAT SERPL: 33.9 (ref 7–25)
CALCIUM SPEC-SCNC: 9.2 MG/DL (ref 8.6–10.5)
CHLORIDE SERPL-SCNC: 100 MMOL/L (ref 98–107)
CO2 SERPL-SCNC: 29 MMOL/L (ref 22–29)
CREAT SERPL-MCNC: 1.24 MG/DL (ref 0.57–1)
DEPRECATED RDW RBC AUTO: 52.5 FL (ref 37–54)
ERYTHROCYTE [DISTWIDTH] IN BLOOD BY AUTOMATED COUNT: 14.6 % (ref 12.3–15.4)
GFR SERPL CREATININE-BSD FRML MDRD: 41 ML/MIN/1.73
GLUCOSE BLDC GLUCOMTR-MCNC: 136 MG/DL (ref 70–130)
GLUCOSE BLDC GLUCOMTR-MCNC: 150 MG/DL (ref 70–130)
GLUCOSE BLDC GLUCOMTR-MCNC: 228 MG/DL (ref 70–130)
GLUCOSE BLDC GLUCOMTR-MCNC: 249 MG/DL (ref 70–130)
GLUCOSE BLDC GLUCOMTR-MCNC: 257 MG/DL (ref 70–130)
GLUCOSE SERPL-MCNC: 155 MG/DL (ref 65–99)
HCT VFR BLD AUTO: 32.1 % (ref 34–46.6)
HGB BLD-MCNC: 9.5 G/DL (ref 12–15.9)
MCH RBC QN AUTO: 29.1 PG (ref 26.6–33)
MCHC RBC AUTO-ENTMCNC: 29.6 G/DL (ref 31.5–35.7)
MCV RBC AUTO: 98.2 FL (ref 79–97)
PHOSPHATE SERPL-MCNC: 5.4 MG/DL (ref 2.5–4.5)
PLATELET # BLD AUTO: 300 10*3/MM3 (ref 140–450)
PMV BLD AUTO: 10.3 FL (ref 6–12)
POTASSIUM SERPL-SCNC: 4.7 MMOL/L (ref 3.5–5.2)
RBC # BLD AUTO: 3.27 10*6/MM3 (ref 3.77–5.28)
SODIUM SERPL-SCNC: 138 MMOL/L (ref 136–145)
WBC # BLD AUTO: 6.12 10*3/MM3 (ref 3.4–10.8)

## 2021-06-21 PROCEDURE — 63710000001 INSULIN DETEMIR PER 5 UNITS: Performed by: INTERNAL MEDICINE

## 2021-06-21 PROCEDURE — B2111ZZ FLUOROSCOPY OF MULTIPLE CORONARY ARTERIES USING LOW OSMOLAR CONTRAST: ICD-10-PCS | Performed by: INTERNAL MEDICINE

## 2021-06-21 PROCEDURE — 25010000002 HEPARIN (PORCINE) PER 1000 UNITS: Performed by: INTERNAL MEDICINE

## 2021-06-21 PROCEDURE — 99233 SBSQ HOSP IP/OBS HIGH 50: CPT | Performed by: INTERNAL MEDICINE

## 2021-06-21 PROCEDURE — C1894 INTRO/SHEATH, NON-LASER: HCPCS | Performed by: INTERNAL MEDICINE

## 2021-06-21 PROCEDURE — 80069 RENAL FUNCTION PANEL: CPT | Performed by: INTERNAL MEDICINE

## 2021-06-21 PROCEDURE — 63710000001 INSULIN LISPRO (HUMAN) PER 5 UNITS: Performed by: INTERNAL MEDICINE

## 2021-06-21 PROCEDURE — 93458 L HRT ARTERY/VENTRICLE ANGIO: CPT | Performed by: INTERNAL MEDICINE

## 2021-06-21 PROCEDURE — B2151ZZ FLUOROSCOPY OF LEFT HEART USING LOW OSMOLAR CONTRAST: ICD-10-PCS | Performed by: INTERNAL MEDICINE

## 2021-06-21 PROCEDURE — 99024 POSTOP FOLLOW-UP VISIT: CPT | Performed by: THORACIC SURGERY (CARDIOTHORACIC VASCULAR SURGERY)

## 2021-06-21 PROCEDURE — 82962 GLUCOSE BLOOD TEST: CPT

## 2021-06-21 PROCEDURE — 85027 COMPLETE CBC AUTOMATED: CPT | Performed by: INTERNAL MEDICINE

## 2021-06-21 PROCEDURE — C1769 GUIDE WIRE: HCPCS | Performed by: INTERNAL MEDICINE

## 2021-06-21 PROCEDURE — 0 IOPAMIDOL PER 1 ML: Performed by: INTERNAL MEDICINE

## 2021-06-21 PROCEDURE — 71045 X-RAY EXAM CHEST 1 VIEW: CPT

## 2021-06-21 PROCEDURE — 25010000002 CEFTRIAXONE PER 250 MG: Performed by: INTERNAL MEDICINE

## 2021-06-21 PROCEDURE — 94799 UNLISTED PULMONARY SVC/PX: CPT

## 2021-06-21 PROCEDURE — 4A023N7 MEASUREMENT OF CARDIAC SAMPLING AND PRESSURE, LEFT HEART, PERCUTANEOUS APPROACH: ICD-10-PCS | Performed by: INTERNAL MEDICINE

## 2021-06-21 RX ORDER — ACETAMINOPHEN 325 MG/1
650 TABLET ORAL EVERY 4 HOURS PRN
Status: DISCONTINUED | OUTPATIENT
Start: 2021-06-21 | End: 2021-06-21 | Stop reason: SDUPTHER

## 2021-06-21 RX ORDER — SODIUM CHLORIDE 9 MG/ML
100 INJECTION, SOLUTION INTRAVENOUS CONTINUOUS
Status: DISCONTINUED | OUTPATIENT
Start: 2021-06-21 | End: 2021-06-23

## 2021-06-21 RX ORDER — LIDOCAINE HYDROCHLORIDE 10 MG/ML
INJECTION, SOLUTION EPIDURAL; INFILTRATION; INTRACAUDAL; PERINEURAL AS NEEDED
Status: DISCONTINUED | OUTPATIENT
Start: 2021-06-21 | End: 2021-06-21 | Stop reason: HOSPADM

## 2021-06-21 RX ORDER — SODIUM CHLORIDE 9 MG/ML
100 INJECTION, SOLUTION INTRAVENOUS CONTINUOUS
Status: ACTIVE | OUTPATIENT
Start: 2021-06-21 | End: 2021-06-21

## 2021-06-21 RX ORDER — POTASSIUM CHLORIDE 750 MG/1
60 CAPSULE, EXTENDED RELEASE ORAL DAILY
Status: DISCONTINUED | OUTPATIENT
Start: 2021-06-22 | End: 2021-06-24

## 2021-06-21 RX ADMIN — SODIUM CHLORIDE 2 G: 900 INJECTION INTRAVENOUS at 23:58

## 2021-06-21 RX ADMIN — IPRATROPIUM BROMIDE AND ALBUTEROL SULFATE 3 ML: 2.5; .5 SOLUTION RESPIRATORY (INHALATION) at 07:05

## 2021-06-21 RX ADMIN — DOCUSATE SODIUM 50MG AND SENNOSIDES 8.6MG 2 TABLET: 8.6; 5 TABLET, FILM COATED ORAL at 21:41

## 2021-06-21 RX ADMIN — SODIUM CHLORIDE, PRESERVATIVE FREE 10 ML: 5 INJECTION INTRAVENOUS at 21:41

## 2021-06-21 RX ADMIN — ISOSORBIDE DINITRATE: 20 TABLET ORAL at 21:40

## 2021-06-21 RX ADMIN — HEPARIN SODIUM 5000 UNITS: 5000 INJECTION INTRAVENOUS; SUBCUTANEOUS at 21:39

## 2021-06-21 RX ADMIN — ASPIRIN 81 MG: 81 TABLET, COATED ORAL at 08:28

## 2021-06-21 RX ADMIN — PREGABALIN 100 MG: 100 CAPSULE ORAL at 08:28

## 2021-06-21 RX ADMIN — ISOSORBIDE DINITRATE: 20 TABLET ORAL at 06:36

## 2021-06-21 RX ADMIN — Medication 250 MG: at 21:41

## 2021-06-21 RX ADMIN — INSULIN LISPRO 3 UNITS: 100 INJECTION, SOLUTION INTRAVENOUS; SUBCUTANEOUS at 18:33

## 2021-06-21 RX ADMIN — Medication 250 MG: at 08:28

## 2021-06-21 RX ADMIN — ATORVASTATIN CALCIUM 80 MG: 40 TABLET, FILM COATED ORAL at 21:41

## 2021-06-21 RX ADMIN — INSULIN DETEMIR 25 UNITS: 100 INJECTION, SOLUTION SUBCUTANEOUS at 21:39

## 2021-06-21 RX ADMIN — INSULIN LISPRO 8 UNITS: 100 INJECTION, SOLUTION INTRAVENOUS; SUBCUTANEOUS at 18:33

## 2021-06-21 RX ADMIN — ISOSORBIDE DINITRATE: 20 TABLET ORAL at 17:13

## 2021-06-21 RX ADMIN — ALLOPURINOL 100 MG: 100 TABLET ORAL at 08:28

## 2021-06-21 RX ADMIN — CARVEDILOL 12.5 MG: 12.5 TABLET, FILM COATED ORAL at 08:28

## 2021-06-21 RX ADMIN — IPRATROPIUM BROMIDE AND ALBUTEROL SULFATE 3 ML: 2.5; .5 SOLUTION RESPIRATORY (INHALATION) at 21:04

## 2021-06-21 RX ADMIN — INSULIN DETEMIR 25 UNITS: 100 INJECTION, SOLUTION SUBCUTANEOUS at 08:28

## 2021-06-21 RX ADMIN — HEPARIN SODIUM 5000 UNITS: 5000 INJECTION INTRAVENOUS; SUBCUTANEOUS at 06:36

## 2021-06-21 RX ADMIN — SODIUM CHLORIDE, PRESERVATIVE FREE 10 ML: 5 INJECTION INTRAVENOUS at 08:28

## 2021-06-21 RX ADMIN — CARVEDILOL 12.5 MG: 12.5 TABLET, FILM COATED ORAL at 18:32

## 2021-06-22 LAB
ALBUMIN SERPL-MCNC: 2.4 G/DL (ref 3.5–5.2)
ANION GAP SERPL CALCULATED.3IONS-SCNC: 9 MMOL/L (ref 5–15)
BUN SERPL-MCNC: 44 MG/DL (ref 8–23)
BUN/CREAT SERPL: 33.3 (ref 7–25)
CALCIUM SPEC-SCNC: 8.2 MG/DL (ref 8.6–10.5)
CHLORIDE SERPL-SCNC: 97 MMOL/L (ref 98–107)
CO2 SERPL-SCNC: 29 MMOL/L (ref 22–29)
CREAT SERPL-MCNC: 1.32 MG/DL (ref 0.57–1)
DEPRECATED RDW RBC AUTO: 51 FL (ref 37–54)
ERYTHROCYTE [DISTWIDTH] IN BLOOD BY AUTOMATED COUNT: 14.6 % (ref 12.3–15.4)
GFR SERPL CREATININE-BSD FRML MDRD: 39 ML/MIN/1.73
GLUCOSE BLDC GLUCOMTR-MCNC: 182 MG/DL (ref 70–130)
GLUCOSE BLDC GLUCOMTR-MCNC: 208 MG/DL (ref 70–130)
GLUCOSE BLDC GLUCOMTR-MCNC: 208 MG/DL (ref 70–130)
GLUCOSE BLDC GLUCOMTR-MCNC: 275 MG/DL (ref 70–130)
GLUCOSE SERPL-MCNC: 223 MG/DL (ref 65–99)
HCT VFR BLD AUTO: 28.9 % (ref 34–46.6)
HGB BLD-MCNC: 8.8 G/DL (ref 12–15.9)
MCH RBC QN AUTO: 29.5 PG (ref 26.6–33)
MCHC RBC AUTO-ENTMCNC: 30.4 G/DL (ref 31.5–35.7)
MCV RBC AUTO: 97 FL (ref 79–97)
PHOSPHATE SERPL-MCNC: 5 MG/DL (ref 2.5–4.5)
PLATELET # BLD AUTO: 292 10*3/MM3 (ref 140–450)
PMV BLD AUTO: 10.7 FL (ref 6–12)
POTASSIUM SERPL-SCNC: 4.4 MMOL/L (ref 3.5–5.2)
RBC # BLD AUTO: 2.98 10*6/MM3 (ref 3.77–5.28)
SODIUM SERPL-SCNC: 135 MMOL/L (ref 136–145)
WBC # BLD AUTO: 5.7 10*3/MM3 (ref 3.4–10.8)

## 2021-06-22 PROCEDURE — 63710000001 INSULIN DETEMIR PER 5 UNITS: Performed by: INTERNAL MEDICINE

## 2021-06-22 PROCEDURE — C1894 INTRO/SHEATH, NON-LASER: HCPCS

## 2021-06-22 PROCEDURE — 99231 SBSQ HOSP IP/OBS SF/LOW 25: CPT | Performed by: INTERNAL MEDICINE

## 2021-06-22 PROCEDURE — 99233 SBSQ HOSP IP/OBS HIGH 50: CPT | Performed by: INTERNAL MEDICINE

## 2021-06-22 PROCEDURE — 63710000001 INSULIN LISPRO (HUMAN) PER 5 UNITS: Performed by: INTERNAL MEDICINE

## 2021-06-22 PROCEDURE — 80069 RENAL FUNCTION PANEL: CPT | Performed by: INTERNAL MEDICINE

## 2021-06-22 PROCEDURE — 97110 THERAPEUTIC EXERCISES: CPT

## 2021-06-22 PROCEDURE — 25010000002 HEPARIN (PORCINE) PER 1000 UNITS: Performed by: INTERNAL MEDICINE

## 2021-06-22 PROCEDURE — 94799 UNLISTED PULMONARY SVC/PX: CPT

## 2021-06-22 PROCEDURE — 85027 COMPLETE CBC AUTOMATED: CPT | Performed by: INTERNAL MEDICINE

## 2021-06-22 PROCEDURE — 82962 GLUCOSE BLOOD TEST: CPT

## 2021-06-22 PROCEDURE — 97535 SELF CARE MNGMENT TRAINING: CPT

## 2021-06-22 PROCEDURE — 25010000002 CEFTRIAXONE PER 250 MG: Performed by: INTERNAL MEDICINE

## 2021-06-22 PROCEDURE — 97530 THERAPEUTIC ACTIVITIES: CPT

## 2021-06-22 PROCEDURE — 02HV33Z INSERTION OF INFUSION DEVICE INTO SUPERIOR VENA CAVA, PERCUTANEOUS APPROACH: ICD-10-PCS | Performed by: INTERNAL MEDICINE

## 2021-06-22 PROCEDURE — C1751 CATH, INF, PER/CENT/MIDLINE: HCPCS

## 2021-06-22 PROCEDURE — 25010000002 ONDANSETRON PER 1 MG: Performed by: INTERNAL MEDICINE

## 2021-06-22 PROCEDURE — 97602 WOUND(S) CARE NON-SELECTIVE: CPT

## 2021-06-22 RX ORDER — PROCHLORPERAZINE EDISYLATE 5 MG/ML
10 INJECTION INTRAMUSCULAR; INTRAVENOUS ONCE
Status: DISCONTINUED | OUTPATIENT
Start: 2021-06-22 | End: 2021-06-26 | Stop reason: HOSPADM

## 2021-06-22 RX ORDER — SODIUM CHLORIDE 0.9 % (FLUSH) 0.9 %
10 SYRINGE (ML) INJECTION AS NEEDED
Status: DISCONTINUED | OUTPATIENT
Start: 2021-06-22 | End: 2021-06-26 | Stop reason: HOSPADM

## 2021-06-22 RX ORDER — SODIUM CHLORIDE 0.9 % (FLUSH) 0.9 %
10 SYRINGE (ML) INJECTION EVERY 12 HOURS SCHEDULED
Status: DISCONTINUED | OUTPATIENT
Start: 2021-06-22 | End: 2021-06-26 | Stop reason: HOSPADM

## 2021-06-22 RX ORDER — SODIUM CHLORIDE 0.9 % (FLUSH) 0.9 %
20 SYRINGE (ML) INJECTION AS NEEDED
Status: DISCONTINUED | OUTPATIENT
Start: 2021-06-22 | End: 2021-06-26 | Stop reason: HOSPADM

## 2021-06-22 RX ADMIN — HEPARIN SODIUM 5000 UNITS: 5000 INJECTION INTRAVENOUS; SUBCUTANEOUS at 06:33

## 2021-06-22 RX ADMIN — SODIUM CHLORIDE, PRESERVATIVE FREE 10 ML: 5 INJECTION INTRAVENOUS at 08:23

## 2021-06-22 RX ADMIN — ISOSORBIDE DINITRATE: 20 TABLET ORAL at 14:41

## 2021-06-22 RX ADMIN — Medication 250 MG: at 21:23

## 2021-06-22 RX ADMIN — ATORVASTATIN CALCIUM 80 MG: 40 TABLET, FILM COATED ORAL at 21:23

## 2021-06-22 RX ADMIN — ASPIRIN 81 MG: 81 TABLET, COATED ORAL at 08:22

## 2021-06-22 RX ADMIN — POTASSIUM CHLORIDE 60 MEQ: 750 CAPSULE, EXTENDED RELEASE ORAL at 08:23

## 2021-06-22 RX ADMIN — IPRATROPIUM BROMIDE AND ALBUTEROL SULFATE 3 ML: 2.5; .5 SOLUTION RESPIRATORY (INHALATION) at 15:35

## 2021-06-22 RX ADMIN — INSULIN DETEMIR 25 UNITS: 100 INJECTION, SOLUTION SUBCUTANEOUS at 08:21

## 2021-06-22 RX ADMIN — CARVEDILOL 12.5 MG: 12.5 TABLET, FILM COATED ORAL at 08:22

## 2021-06-22 RX ADMIN — INSULIN LISPRO 8 UNITS: 100 INJECTION, SOLUTION INTRAVENOUS; SUBCUTANEOUS at 11:58

## 2021-06-22 RX ADMIN — ONDANSETRON 4 MG: 2 INJECTION INTRAMUSCULAR; INTRAVENOUS at 02:22

## 2021-06-22 RX ADMIN — PREGABALIN 100 MG: 100 CAPSULE ORAL at 08:22

## 2021-06-22 RX ADMIN — CARVEDILOL 12.5 MG: 12.5 TABLET, FILM COATED ORAL at 17:06

## 2021-06-22 RX ADMIN — ISOSORBIDE DINITRATE: 20 TABLET ORAL at 06:32

## 2021-06-22 RX ADMIN — HYDROXYZINE HYDROCHLORIDE 25 MG: 25 TABLET, FILM COATED ORAL at 21:23

## 2021-06-22 RX ADMIN — HEPARIN SODIUM 5000 UNITS: 5000 INJECTION INTRAVENOUS; SUBCUTANEOUS at 14:41

## 2021-06-22 RX ADMIN — DOCUSATE SODIUM 50MG AND SENNOSIDES 8.6MG 2 TABLET: 8.6; 5 TABLET, FILM COATED ORAL at 08:22

## 2021-06-22 RX ADMIN — IPRATROPIUM BROMIDE AND ALBUTEROL SULFATE 3 ML: 2.5; .5 SOLUTION RESPIRATORY (INHALATION) at 08:00

## 2021-06-22 RX ADMIN — INSULIN DETEMIR 25 UNITS: 100 INJECTION, SOLUTION SUBCUTANEOUS at 21:17

## 2021-06-22 RX ADMIN — INSULIN LISPRO 4 UNITS: 100 INJECTION, SOLUTION INTRAVENOUS; SUBCUTANEOUS at 11:58

## 2021-06-22 RX ADMIN — ISOSORBIDE DINITRATE: 20 TABLET ORAL at 21:23

## 2021-06-22 RX ADMIN — HEPARIN SODIUM 5000 UNITS: 5000 INJECTION INTRAVENOUS; SUBCUTANEOUS at 21:17

## 2021-06-22 RX ADMIN — INSULIN LISPRO 3 UNITS: 100 INJECTION, SOLUTION INTRAVENOUS; SUBCUTANEOUS at 08:22

## 2021-06-22 RX ADMIN — INSULIN LISPRO 2 UNITS: 100 INJECTION, SOLUTION INTRAVENOUS; SUBCUTANEOUS at 17:05

## 2021-06-22 RX ADMIN — INSULIN LISPRO 8 UNITS: 100 INJECTION, SOLUTION INTRAVENOUS; SUBCUTANEOUS at 08:22

## 2021-06-22 RX ADMIN — ALLOPURINOL 100 MG: 100 TABLET ORAL at 08:22

## 2021-06-22 RX ADMIN — SODIUM CHLORIDE, PRESERVATIVE FREE 10 ML: 5 INJECTION INTRAVENOUS at 21:24

## 2021-06-22 RX ADMIN — DOCUSATE SODIUM 50MG AND SENNOSIDES 8.6MG 2 TABLET: 8.6; 5 TABLET, FILM COATED ORAL at 21:23

## 2021-06-22 RX ADMIN — INSULIN LISPRO 8 UNITS: 100 INJECTION, SOLUTION INTRAVENOUS; SUBCUTANEOUS at 17:06

## 2021-06-22 RX ADMIN — SODIUM CHLORIDE 2 G: 900 INJECTION INTRAVENOUS at 22:09

## 2021-06-22 RX ADMIN — Medication 250 MG: at 08:22

## 2021-06-22 RX ADMIN — IPRATROPIUM BROMIDE AND ALBUTEROL SULFATE 3 ML: 2.5; .5 SOLUTION RESPIRATORY (INHALATION) at 21:03

## 2021-06-23 LAB
ANION GAP SERPL CALCULATED.3IONS-SCNC: 6 MMOL/L (ref 5–15)
BASOPHILS # BLD AUTO: 0.03 10*3/MM3 (ref 0–0.2)
BASOPHILS NFR BLD AUTO: 0.5 % (ref 0–1.5)
BUN SERPL-MCNC: 45 MG/DL (ref 8–23)
BUN/CREAT SERPL: 35.4 (ref 7–25)
CALCIUM SPEC-SCNC: 8.8 MG/DL (ref 8.6–10.5)
CHLORIDE SERPL-SCNC: 104 MMOL/L (ref 98–107)
CO2 SERPL-SCNC: 28 MMOL/L (ref 22–29)
CREAT SERPL-MCNC: 1.27 MG/DL (ref 0.57–1)
DEPRECATED RDW RBC AUTO: 51.6 FL (ref 37–54)
EOSINOPHIL # BLD AUTO: 0.33 10*3/MM3 (ref 0–0.4)
EOSINOPHIL NFR BLD AUTO: 5.7 % (ref 0.3–6.2)
ERYTHROCYTE [DISTWIDTH] IN BLOOD BY AUTOMATED COUNT: 14.8 % (ref 12.3–15.4)
GFR SERPL CREATININE-BSD FRML MDRD: 40 ML/MIN/1.73
GLUCOSE BLDC GLUCOMTR-MCNC: 114 MG/DL (ref 70–130)
GLUCOSE BLDC GLUCOMTR-MCNC: 180 MG/DL (ref 70–130)
GLUCOSE BLDC GLUCOMTR-MCNC: 180 MG/DL (ref 70–130)
GLUCOSE BLDC GLUCOMTR-MCNC: 199 MG/DL (ref 70–130)
GLUCOSE SERPL-MCNC: 118 MG/DL (ref 65–99)
HCT VFR BLD AUTO: 30 % (ref 34–46.6)
HGB BLD-MCNC: 9.2 G/DL (ref 12–15.9)
IMM GRANULOCYTES # BLD AUTO: 0.03 10*3/MM3 (ref 0–0.05)
IMM GRANULOCYTES NFR BLD AUTO: 0.5 % (ref 0–0.5)
LYMPHOCYTES # BLD AUTO: 0.98 10*3/MM3 (ref 0.7–3.1)
LYMPHOCYTES NFR BLD AUTO: 16.9 % (ref 19.6–45.3)
MCH RBC QN AUTO: 29.4 PG (ref 26.6–33)
MCHC RBC AUTO-ENTMCNC: 30.7 G/DL (ref 31.5–35.7)
MCV RBC AUTO: 95.8 FL (ref 79–97)
MONOCYTES # BLD AUTO: 0.54 10*3/MM3 (ref 0.1–0.9)
MONOCYTES NFR BLD AUTO: 9.3 % (ref 5–12)
NEUTROPHILS NFR BLD AUTO: 3.9 10*3/MM3 (ref 1.7–7)
NEUTROPHILS NFR BLD AUTO: 67.1 % (ref 42.7–76)
NRBC BLD AUTO-RTO: 0 /100 WBC (ref 0–0.2)
NT-PROBNP SERPL-MCNC: 5053 PG/ML (ref 0–1800)
PLATELET # BLD AUTO: 291 10*3/MM3 (ref 140–450)
PMV BLD AUTO: 10.4 FL (ref 6–12)
POTASSIUM SERPL-SCNC: 5.1 MMOL/L (ref 3.5–5.2)
RBC # BLD AUTO: 3.13 10*6/MM3 (ref 3.77–5.28)
SODIUM SERPL-SCNC: 138 MMOL/L (ref 136–145)
WBC # BLD AUTO: 5.81 10*3/MM3 (ref 3.4–10.8)

## 2021-06-23 PROCEDURE — 63710000001 INSULIN DETEMIR PER 5 UNITS: Performed by: INTERNAL MEDICINE

## 2021-06-23 PROCEDURE — 25010000002 HEPARIN (PORCINE) PER 1000 UNITS: Performed by: INTERNAL MEDICINE

## 2021-06-23 PROCEDURE — 85025 COMPLETE CBC W/AUTO DIFF WBC: CPT | Performed by: INTERNAL MEDICINE

## 2021-06-23 PROCEDURE — 94799 UNLISTED PULMONARY SVC/PX: CPT

## 2021-06-23 PROCEDURE — 82962 GLUCOSE BLOOD TEST: CPT

## 2021-06-23 PROCEDURE — 63710000001 INSULIN LISPRO (HUMAN) PER 5 UNITS: Performed by: INTERNAL MEDICINE

## 2021-06-23 PROCEDURE — 83880 ASSAY OF NATRIURETIC PEPTIDE: CPT | Performed by: INTERNAL MEDICINE

## 2021-06-23 PROCEDURE — 80048 BASIC METABOLIC PNL TOTAL CA: CPT | Performed by: INTERNAL MEDICINE

## 2021-06-23 PROCEDURE — 25010000002 CEFTRIAXONE PER 250 MG: Performed by: INTERNAL MEDICINE

## 2021-06-23 PROCEDURE — 99231 SBSQ HOSP IP/OBS SF/LOW 25: CPT | Performed by: INTERNAL MEDICINE

## 2021-06-23 PROCEDURE — 99232 SBSQ HOSP IP/OBS MODERATE 35: CPT | Performed by: INTERNAL MEDICINE

## 2021-06-23 RX ORDER — FUROSEMIDE 40 MG/1
40 TABLET ORAL DAILY
Status: CANCELLED | OUTPATIENT
Start: 2021-06-24

## 2021-06-23 RX ORDER — FUROSEMIDE 20 MG/1
20 TABLET ORAL DAILY
Status: DISCONTINUED | OUTPATIENT
Start: 2021-06-23 | End: 2021-06-26 | Stop reason: HOSPADM

## 2021-06-23 RX ADMIN — CARVEDILOL 12.5 MG: 12.5 TABLET, FILM COATED ORAL at 17:56

## 2021-06-23 RX ADMIN — HEPARIN SODIUM 5000 UNITS: 5000 INJECTION INTRAVENOUS; SUBCUTANEOUS at 13:01

## 2021-06-23 RX ADMIN — HYDROXYZINE HYDROCHLORIDE 25 MG: 25 TABLET, FILM COATED ORAL at 09:59

## 2021-06-23 RX ADMIN — INSULIN LISPRO 8 UNITS: 100 INJECTION, SOLUTION INTRAVENOUS; SUBCUTANEOUS at 17:55

## 2021-06-23 RX ADMIN — HEPARIN SODIUM 5000 UNITS: 5000 INJECTION INTRAVENOUS; SUBCUTANEOUS at 20:48

## 2021-06-23 RX ADMIN — POTASSIUM CHLORIDE 60 MEQ: 750 CAPSULE, EXTENDED RELEASE ORAL at 10:00

## 2021-06-23 RX ADMIN — INSULIN LISPRO 8 UNITS: 100 INJECTION, SOLUTION INTRAVENOUS; SUBCUTANEOUS at 13:01

## 2021-06-23 RX ADMIN — INSULIN LISPRO 8 UNITS: 100 INJECTION, SOLUTION INTRAVENOUS; SUBCUTANEOUS at 10:04

## 2021-06-23 RX ADMIN — IPRATROPIUM BROMIDE AND ALBUTEROL SULFATE 3 ML: 2.5; .5 SOLUTION RESPIRATORY (INHALATION) at 12:10

## 2021-06-23 RX ADMIN — PREGABALIN 100 MG: 100 CAPSULE ORAL at 10:00

## 2021-06-23 RX ADMIN — DOCUSATE SODIUM 50MG AND SENNOSIDES 8.6MG 2 TABLET: 8.6; 5 TABLET, FILM COATED ORAL at 20:48

## 2021-06-23 RX ADMIN — ALLOPURINOL 100 MG: 100 TABLET ORAL at 10:00

## 2021-06-23 RX ADMIN — FUROSEMIDE 20 MG: 20 TABLET ORAL at 10:00

## 2021-06-23 RX ADMIN — SODIUM CHLORIDE, PRESERVATIVE FREE 10 ML: 5 INJECTION INTRAVENOUS at 10:04

## 2021-06-23 RX ADMIN — ISOSORBIDE DINITRATE: 20 TABLET ORAL at 05:50

## 2021-06-23 RX ADMIN — INSULIN LISPRO 2 UNITS: 100 INJECTION, SOLUTION INTRAVENOUS; SUBCUTANEOUS at 13:00

## 2021-06-23 RX ADMIN — INSULIN DETEMIR 25 UNITS: 100 INJECTION, SOLUTION SUBCUTANEOUS at 09:55

## 2021-06-23 RX ADMIN — SODIUM CHLORIDE, PRESERVATIVE FREE 10 ML: 5 INJECTION INTRAVENOUS at 10:05

## 2021-06-23 RX ADMIN — IPRATROPIUM BROMIDE AND ALBUTEROL SULFATE 3 ML: 2.5; .5 SOLUTION RESPIRATORY (INHALATION) at 15:54

## 2021-06-23 RX ADMIN — Medication 250 MG: at 20:48

## 2021-06-23 RX ADMIN — INSULIN LISPRO 2 UNITS: 100 INJECTION, SOLUTION INTRAVENOUS; SUBCUTANEOUS at 17:55

## 2021-06-23 RX ADMIN — SODIUM CHLORIDE 2 G: 900 INJECTION INTRAVENOUS at 23:27

## 2021-06-23 RX ADMIN — INSULIN DETEMIR 25 UNITS: 100 INJECTION, SOLUTION SUBCUTANEOUS at 20:46

## 2021-06-23 RX ADMIN — ASPIRIN 81 MG: 81 TABLET, COATED ORAL at 10:00

## 2021-06-23 RX ADMIN — ISOSORBIDE DINITRATE: 20 TABLET ORAL at 13:01

## 2021-06-23 RX ADMIN — HEPARIN SODIUM 5000 UNITS: 5000 INJECTION INTRAVENOUS; SUBCUTANEOUS at 05:50

## 2021-06-23 RX ADMIN — Medication 250 MG: at 10:00

## 2021-06-23 RX ADMIN — ATORVASTATIN CALCIUM 80 MG: 40 TABLET, FILM COATED ORAL at 20:48

## 2021-06-23 RX ADMIN — HYDROXYZINE HYDROCHLORIDE 25 MG: 25 TABLET, FILM COATED ORAL at 20:48

## 2021-06-23 RX ADMIN — DOCUSATE SODIUM 50MG AND SENNOSIDES 8.6MG 2 TABLET: 8.6; 5 TABLET, FILM COATED ORAL at 09:59

## 2021-06-23 RX ADMIN — IPRATROPIUM BROMIDE AND ALBUTEROL SULFATE 3 ML: 2.5; .5 SOLUTION RESPIRATORY (INHALATION) at 20:11

## 2021-06-23 RX ADMIN — CARVEDILOL 12.5 MG: 12.5 TABLET, FILM COATED ORAL at 10:00

## 2021-06-23 RX ADMIN — ISOSORBIDE DINITRATE: 20 TABLET ORAL at 23:28

## 2021-06-24 LAB
ANION GAP SERPL CALCULATED.3IONS-SCNC: 10 MMOL/L (ref 5–15)
BUN SERPL-MCNC: 45 MG/DL (ref 8–23)
BUN/CREAT SERPL: 34.4 (ref 7–25)
CALCIUM SPEC-SCNC: 8.9 MG/DL (ref 8.6–10.5)
CHLORIDE SERPL-SCNC: 103 MMOL/L (ref 98–107)
CO2 SERPL-SCNC: 25 MMOL/L (ref 22–29)
CREAT SERPL-MCNC: 1.31 MG/DL (ref 0.57–1)
GFR SERPL CREATININE-BSD FRML MDRD: 39 ML/MIN/1.73
GLUCOSE BLDC GLUCOMTR-MCNC: 146 MG/DL (ref 70–130)
GLUCOSE BLDC GLUCOMTR-MCNC: 166 MG/DL (ref 70–130)
GLUCOSE BLDC GLUCOMTR-MCNC: 169 MG/DL (ref 70–130)
GLUCOSE BLDC GLUCOMTR-MCNC: 188 MG/DL (ref 70–130)
GLUCOSE SERPL-MCNC: 181 MG/DL (ref 65–99)
POTASSIUM SERPL-SCNC: 5.5 MMOL/L (ref 3.5–5.2)
SODIUM SERPL-SCNC: 138 MMOL/L (ref 136–145)

## 2021-06-24 PROCEDURE — 97110 THERAPEUTIC EXERCISES: CPT

## 2021-06-24 PROCEDURE — 63710000001 INSULIN LISPRO (HUMAN) PER 5 UNITS: Performed by: INTERNAL MEDICINE

## 2021-06-24 PROCEDURE — 80048 BASIC METABOLIC PNL TOTAL CA: CPT | Performed by: INTERNAL MEDICINE

## 2021-06-24 PROCEDURE — 94799 UNLISTED PULMONARY SVC/PX: CPT

## 2021-06-24 PROCEDURE — 25010000002 CEFTRIAXONE PER 250 MG: Performed by: INTERNAL MEDICINE

## 2021-06-24 PROCEDURE — 63710000001 INSULIN DETEMIR PER 5 UNITS: Performed by: INTERNAL MEDICINE

## 2021-06-24 PROCEDURE — 97530 THERAPEUTIC ACTIVITIES: CPT

## 2021-06-24 PROCEDURE — 25010000002 HEPARIN (PORCINE) PER 1000 UNITS: Performed by: INTERNAL MEDICINE

## 2021-06-24 PROCEDURE — 82962 GLUCOSE BLOOD TEST: CPT

## 2021-06-24 PROCEDURE — 97535 SELF CARE MNGMENT TRAINING: CPT

## 2021-06-24 PROCEDURE — 99232 SBSQ HOSP IP/OBS MODERATE 35: CPT | Performed by: INTERNAL MEDICINE

## 2021-06-24 RX ADMIN — HYDROXYZINE HYDROCHLORIDE 25 MG: 25 TABLET, FILM COATED ORAL at 05:10

## 2021-06-24 RX ADMIN — HEPARIN SODIUM 5000 UNITS: 5000 INJECTION INTRAVENOUS; SUBCUTANEOUS at 13:29

## 2021-06-24 RX ADMIN — ALLOPURINOL 100 MG: 100 TABLET ORAL at 10:12

## 2021-06-24 RX ADMIN — ASPIRIN 81 MG: 81 TABLET, COATED ORAL at 10:14

## 2021-06-24 RX ADMIN — ISOSORBIDE DINITRATE: 20 TABLET ORAL at 05:10

## 2021-06-24 RX ADMIN — ISOSORBIDE DINITRATE: 20 TABLET ORAL at 17:39

## 2021-06-24 RX ADMIN — FUROSEMIDE 20 MG: 20 TABLET ORAL at 10:15

## 2021-06-24 RX ADMIN — SODIUM CHLORIDE, PRESERVATIVE FREE 10 ML: 5 INJECTION INTRAVENOUS at 10:17

## 2021-06-24 RX ADMIN — SODIUM CHLORIDE, PRESERVATIVE FREE 10 ML: 5 INJECTION INTRAVENOUS at 20:43

## 2021-06-24 RX ADMIN — INSULIN DETEMIR 25 UNITS: 100 INJECTION, SOLUTION SUBCUTANEOUS at 20:49

## 2021-06-24 RX ADMIN — ATORVASTATIN CALCIUM 80 MG: 40 TABLET, FILM COATED ORAL at 20:42

## 2021-06-24 RX ADMIN — SODIUM CHLORIDE, PRESERVATIVE FREE 10 ML: 5 INJECTION INTRAVENOUS at 10:16

## 2021-06-24 RX ADMIN — SODIUM ZIRCONIUM CYCLOSILICATE 10 G: 10 POWDER, FOR SUSPENSION ORAL at 13:30

## 2021-06-24 RX ADMIN — CARVEDILOL 12.5 MG: 12.5 TABLET, FILM COATED ORAL at 17:41

## 2021-06-24 RX ADMIN — ISOSORBIDE DINITRATE: 20 TABLET ORAL at 10:13

## 2021-06-24 RX ADMIN — HEPARIN SODIUM 5000 UNITS: 5000 INJECTION INTRAVENOUS; SUBCUTANEOUS at 05:10

## 2021-06-24 RX ADMIN — DOCUSATE SODIUM 50MG AND SENNOSIDES 8.6MG 2 TABLET: 8.6; 5 TABLET, FILM COATED ORAL at 10:14

## 2021-06-24 RX ADMIN — Medication 250 MG: at 20:42

## 2021-06-24 RX ADMIN — INSULIN LISPRO 8 UNITS: 100 INJECTION, SOLUTION INTRAVENOUS; SUBCUTANEOUS at 13:30

## 2021-06-24 RX ADMIN — INSULIN LISPRO 2 UNITS: 100 INJECTION, SOLUTION INTRAVENOUS; SUBCUTANEOUS at 17:40

## 2021-06-24 RX ADMIN — ACETAMINOPHEN 650 MG: 325 TABLET ORAL at 05:10

## 2021-06-24 RX ADMIN — INSULIN LISPRO 8 UNITS: 100 INJECTION, SOLUTION INTRAVENOUS; SUBCUTANEOUS at 17:40

## 2021-06-24 RX ADMIN — ISOSORBIDE DINITRATE: 20 TABLET ORAL at 23:02

## 2021-06-24 RX ADMIN — PREGABALIN 100 MG: 100 CAPSULE ORAL at 10:12

## 2021-06-24 RX ADMIN — HEPARIN SODIUM 5000 UNITS: 5000 INJECTION INTRAVENOUS; SUBCUTANEOUS at 20:42

## 2021-06-24 RX ADMIN — CARVEDILOL 12.5 MG: 12.5 TABLET, FILM COATED ORAL at 10:12

## 2021-06-24 RX ADMIN — INSULIN LISPRO 8 UNITS: 100 INJECTION, SOLUTION INTRAVENOUS; SUBCUTANEOUS at 10:11

## 2021-06-24 RX ADMIN — Medication 250 MG: at 10:16

## 2021-06-24 RX ADMIN — POTASSIUM CHLORIDE 60 MEQ: 750 CAPSULE, EXTENDED RELEASE ORAL at 10:11

## 2021-06-24 RX ADMIN — DOCUSATE SODIUM 50MG AND SENNOSIDES 8.6MG 2 TABLET: 8.6; 5 TABLET, FILM COATED ORAL at 20:42

## 2021-06-24 RX ADMIN — IPRATROPIUM BROMIDE AND ALBUTEROL SULFATE 3 ML: 2.5; .5 SOLUTION RESPIRATORY (INHALATION) at 13:14

## 2021-06-24 RX ADMIN — INSULIN LISPRO 2 UNITS: 100 INJECTION, SOLUTION INTRAVENOUS; SUBCUTANEOUS at 13:29

## 2021-06-24 RX ADMIN — IPRATROPIUM BROMIDE AND ALBUTEROL SULFATE 3 ML: 2.5; .5 SOLUTION RESPIRATORY (INHALATION) at 16:04

## 2021-06-24 RX ADMIN — SODIUM CHLORIDE 2 G: 900 INJECTION INTRAVENOUS at 20:37

## 2021-06-24 RX ADMIN — INSULIN DETEMIR 25 UNITS: 100 INJECTION, SOLUTION SUBCUTANEOUS at 10:09

## 2021-06-24 RX ADMIN — IPRATROPIUM BROMIDE AND ALBUTEROL SULFATE 3 ML: 2.5; .5 SOLUTION RESPIRATORY (INHALATION) at 20:59

## 2021-06-25 ENCOUNTER — APPOINTMENT (OUTPATIENT)
Dept: GENERAL RADIOLOGY | Facility: HOSPITAL | Age: 81
End: 2021-06-25

## 2021-06-25 LAB
ANION GAP SERPL CALCULATED.3IONS-SCNC: 9 MMOL/L (ref 5–15)
BUN SERPL-MCNC: 44 MG/DL (ref 8–23)
BUN/CREAT SERPL: 40 (ref 7–25)
CALCIUM SPEC-SCNC: 8.8 MG/DL (ref 8.6–10.5)
CHLORIDE SERPL-SCNC: 106 MMOL/L (ref 98–107)
CO2 SERPL-SCNC: 25 MMOL/L (ref 22–29)
CREAT SERPL-MCNC: 1.1 MG/DL (ref 0.57–1)
DEPRECATED RDW RBC AUTO: 54.1 FL (ref 37–54)
ERYTHROCYTE [DISTWIDTH] IN BLOOD BY AUTOMATED COUNT: 15.2 % (ref 12.3–15.4)
GFR SERPL CREATININE-BSD FRML MDRD: 48 ML/MIN/1.73
GLUCOSE BLDC GLUCOMTR-MCNC: 148 MG/DL (ref 70–130)
GLUCOSE BLDC GLUCOMTR-MCNC: 173 MG/DL (ref 70–130)
GLUCOSE BLDC GLUCOMTR-MCNC: 187 MG/DL (ref 70–130)
GLUCOSE BLDC GLUCOMTR-MCNC: 236 MG/DL (ref 70–130)
GLUCOSE SERPL-MCNC: 204 MG/DL (ref 65–99)
HCT VFR BLD AUTO: 29.4 % (ref 34–46.6)
HGB BLD-MCNC: 9 G/DL (ref 12–15.9)
MCH RBC QN AUTO: 29.7 PG (ref 26.6–33)
MCHC RBC AUTO-ENTMCNC: 30.6 G/DL (ref 31.5–35.7)
MCV RBC AUTO: 97 FL (ref 79–97)
PLATELET # BLD AUTO: 293 10*3/MM3 (ref 140–450)
PMV BLD AUTO: 10.9 FL (ref 6–12)
POTASSIUM SERPL-SCNC: 5.4 MMOL/L (ref 3.5–5.2)
PROCALCITONIN SERPL-MCNC: 0.08 NG/ML (ref 0–0.25)
RBC # BLD AUTO: 3.03 10*6/MM3 (ref 3.77–5.28)
SODIUM SERPL-SCNC: 140 MMOL/L (ref 136–145)
WBC # BLD AUTO: 5.56 10*3/MM3 (ref 3.4–10.8)

## 2021-06-25 PROCEDURE — 99233 SBSQ HOSP IP/OBS HIGH 50: CPT | Performed by: INTERNAL MEDICINE

## 2021-06-25 PROCEDURE — 85027 COMPLETE CBC AUTOMATED: CPT | Performed by: INTERNAL MEDICINE

## 2021-06-25 PROCEDURE — 25010000002 HEPARIN (PORCINE) PER 1000 UNITS: Performed by: INTERNAL MEDICINE

## 2021-06-25 PROCEDURE — 94799 UNLISTED PULMONARY SVC/PX: CPT

## 2021-06-25 PROCEDURE — 71045 X-RAY EXAM CHEST 1 VIEW: CPT

## 2021-06-25 PROCEDURE — 25010000002 CEFTRIAXONE PER 250 MG: Performed by: INTERNAL MEDICINE

## 2021-06-25 PROCEDURE — 63710000001 INSULIN LISPRO (HUMAN) PER 5 UNITS: Performed by: INTERNAL MEDICINE

## 2021-06-25 PROCEDURE — 80048 BASIC METABOLIC PNL TOTAL CA: CPT | Performed by: INTERNAL MEDICINE

## 2021-06-25 PROCEDURE — 63710000001 INSULIN DETEMIR PER 5 UNITS: Performed by: INTERNAL MEDICINE

## 2021-06-25 PROCEDURE — 82962 GLUCOSE BLOOD TEST: CPT

## 2021-06-25 PROCEDURE — 25010000002 FUROSEMIDE PER 20 MG: Performed by: INTERNAL MEDICINE

## 2021-06-25 PROCEDURE — 84145 PROCALCITONIN (PCT): CPT | Performed by: INTERNAL MEDICINE

## 2021-06-25 RX ORDER — FUROSEMIDE 10 MG/ML
20 INJECTION INTRAMUSCULAR; INTRAVENOUS ONCE
Status: COMPLETED | OUTPATIENT
Start: 2021-06-25 | End: 2021-06-25

## 2021-06-25 RX ORDER — FLUTICASONE PROPIONATE 50 MCG
2 SPRAY, SUSPENSION (ML) NASAL DAILY
Status: DISCONTINUED | OUTPATIENT
Start: 2021-06-25 | End: 2021-06-26 | Stop reason: HOSPADM

## 2021-06-25 RX ORDER — ECHINACEA PURPUREA EXTRACT 125 MG
1 TABLET ORAL AS NEEDED
Status: DISCONTINUED | OUTPATIENT
Start: 2021-06-25 | End: 2021-06-26 | Stop reason: HOSPADM

## 2021-06-25 RX ORDER — GUAIFENESIN 600 MG/1
600 TABLET, EXTENDED RELEASE ORAL EVERY 12 HOURS SCHEDULED
Status: DISCONTINUED | OUTPATIENT
Start: 2021-06-25 | End: 2021-06-26 | Stop reason: HOSPADM

## 2021-06-25 RX ADMIN — HYDROXYZINE HYDROCHLORIDE 25 MG: 25 TABLET, FILM COATED ORAL at 20:40

## 2021-06-25 RX ADMIN — INSULIN LISPRO 8 UNITS: 100 INJECTION, SOLUTION INTRAVENOUS; SUBCUTANEOUS at 08:55

## 2021-06-25 RX ADMIN — INSULIN DETEMIR 25 UNITS: 100 INJECTION, SOLUTION SUBCUTANEOUS at 08:56

## 2021-06-25 RX ADMIN — DOCUSATE SODIUM 50MG AND SENNOSIDES 8.6MG 2 TABLET: 8.6; 5 TABLET, FILM COATED ORAL at 08:54

## 2021-06-25 RX ADMIN — IPRATROPIUM BROMIDE AND ALBUTEROL SULFATE 3 ML: 2.5; .5 SOLUTION RESPIRATORY (INHALATION) at 10:56

## 2021-06-25 RX ADMIN — ALLOPURINOL 100 MG: 100 TABLET ORAL at 08:55

## 2021-06-25 RX ADMIN — SODIUM CHLORIDE, PRESERVATIVE FREE 10 ML: 5 INJECTION INTRAVENOUS at 20:44

## 2021-06-25 RX ADMIN — FUROSEMIDE 20 MG: 10 INJECTION, SOLUTION INTRAMUSCULAR; INTRAVENOUS at 12:42

## 2021-06-25 RX ADMIN — PREGABALIN 100 MG: 100 CAPSULE ORAL at 08:55

## 2021-06-25 RX ADMIN — INSULIN LISPRO 2 UNITS: 100 INJECTION, SOLUTION INTRAVENOUS; SUBCUTANEOUS at 18:11

## 2021-06-25 RX ADMIN — GUAIFENESIN 600 MG: 600 TABLET, EXTENDED RELEASE ORAL at 12:41

## 2021-06-25 RX ADMIN — ATORVASTATIN CALCIUM 80 MG: 40 TABLET, FILM COATED ORAL at 20:40

## 2021-06-25 RX ADMIN — HEPARIN SODIUM 5000 UNITS: 5000 INJECTION INTRAVENOUS; SUBCUTANEOUS at 15:10

## 2021-06-25 RX ADMIN — IPRATROPIUM BROMIDE AND ALBUTEROL SULFATE 3 ML: 2.5; .5 SOLUTION RESPIRATORY (INHALATION) at 20:08

## 2021-06-25 RX ADMIN — FUROSEMIDE 20 MG: 20 TABLET ORAL at 08:55

## 2021-06-25 RX ADMIN — Medication 250 MG: at 20:40

## 2021-06-25 RX ADMIN — Medication 250 MG: at 08:55

## 2021-06-25 RX ADMIN — ASPIRIN 81 MG: 81 TABLET, COATED ORAL at 08:55

## 2021-06-25 RX ADMIN — IPRATROPIUM BROMIDE AND ALBUTEROL SULFATE 3 ML: 2.5; .5 SOLUTION RESPIRATORY (INHALATION) at 15:38

## 2021-06-25 RX ADMIN — INSULIN LISPRO 8 UNITS: 100 INJECTION, SOLUTION INTRAVENOUS; SUBCUTANEOUS at 12:49

## 2021-06-25 RX ADMIN — ISOSORBIDE DINITRATE: 20 TABLET ORAL at 20:40

## 2021-06-25 RX ADMIN — INSULIN LISPRO 8 UNITS: 100 INJECTION, SOLUTION INTRAVENOUS; SUBCUTANEOUS at 18:11

## 2021-06-25 RX ADMIN — IPRATROPIUM BROMIDE AND ALBUTEROL SULFATE 3 ML: 2.5; .5 SOLUTION RESPIRATORY (INHALATION) at 07:58

## 2021-06-25 RX ADMIN — HEPARIN SODIUM 5000 UNITS: 5000 INJECTION INTRAVENOUS; SUBCUTANEOUS at 07:30

## 2021-06-25 RX ADMIN — SODIUM CHLORIDE 2 G: 900 INJECTION INTRAVENOUS at 20:57

## 2021-06-25 RX ADMIN — SODIUM ZIRCONIUM CYCLOSILICATE 10 G: 10 POWDER, FOR SUSPENSION ORAL at 12:42

## 2021-06-25 RX ADMIN — INSULIN DETEMIR 25 UNITS: 100 INJECTION, SOLUTION SUBCUTANEOUS at 20:58

## 2021-06-25 RX ADMIN — FLUTICASONE PROPIONATE 2 SPRAY: 50 SPRAY, METERED NASAL at 12:42

## 2021-06-25 RX ADMIN — ISOSORBIDE DINITRATE: 20 TABLET ORAL at 07:29

## 2021-06-25 RX ADMIN — GUAIFENESIN 600 MG: 600 TABLET, EXTENDED RELEASE ORAL at 20:41

## 2021-06-25 RX ADMIN — CARVEDILOL 12.5 MG: 12.5 TABLET, FILM COATED ORAL at 08:55

## 2021-06-25 RX ADMIN — INSULIN LISPRO 2 UNITS: 100 INJECTION, SOLUTION INTRAVENOUS; SUBCUTANEOUS at 08:55

## 2021-06-25 RX ADMIN — HEPARIN SODIUM 5000 UNITS: 5000 INJECTION INTRAVENOUS; SUBCUTANEOUS at 20:40

## 2021-06-25 RX ADMIN — CARVEDILOL 12.5 MG: 12.5 TABLET, FILM COATED ORAL at 18:11

## 2021-06-25 RX ADMIN — DOCUSATE SODIUM 50MG AND SENNOSIDES 8.6MG 2 TABLET: 8.6; 5 TABLET, FILM COATED ORAL at 20:40

## 2021-06-25 RX ADMIN — ISOSORBIDE DINITRATE: 20 TABLET ORAL at 15:10

## 2021-06-26 VITALS
BODY MASS INDEX: 31.96 KG/M2 | SYSTOLIC BLOOD PRESSURE: 137 MMHG | HEART RATE: 75 BPM | RESPIRATION RATE: 18 BRPM | HEIGHT: 64 IN | DIASTOLIC BLOOD PRESSURE: 82 MMHG | WEIGHT: 187.2 LBS | TEMPERATURE: 97.7 F | OXYGEN SATURATION: 96 %

## 2021-06-26 LAB
ANION GAP SERPL CALCULATED.3IONS-SCNC: 7 MMOL/L (ref 5–15)
BUN SERPL-MCNC: 36 MG/DL (ref 8–23)
BUN/CREAT SERPL: 38.3 (ref 7–25)
CALCIUM SPEC-SCNC: 8.5 MG/DL (ref 8.6–10.5)
CHLORIDE SERPL-SCNC: 105 MMOL/L (ref 98–107)
CO2 SERPL-SCNC: 27 MMOL/L (ref 22–29)
CREAT SERPL-MCNC: 0.94 MG/DL (ref 0.57–1)
DEPRECATED RDW RBC AUTO: 51.8 FL (ref 37–54)
ERYTHROCYTE [DISTWIDTH] IN BLOOD BY AUTOMATED COUNT: 15 % (ref 12.3–15.4)
GFR SERPL CREATININE-BSD FRML MDRD: 57 ML/MIN/1.73
GLUCOSE BLDC GLUCOMTR-MCNC: 179 MG/DL (ref 70–130)
GLUCOSE BLDC GLUCOMTR-MCNC: 203 MG/DL (ref 70–130)
GLUCOSE SERPL-MCNC: 180 MG/DL (ref 65–99)
HCT VFR BLD AUTO: 30 % (ref 34–46.6)
HGB BLD-MCNC: 9.3 G/DL (ref 12–15.9)
MCH RBC QN AUTO: 29.1 PG (ref 26.6–33)
MCHC RBC AUTO-ENTMCNC: 31 G/DL (ref 31.5–35.7)
MCV RBC AUTO: 93.8 FL (ref 79–97)
PLATELET # BLD AUTO: 297 10*3/MM3 (ref 140–450)
PMV BLD AUTO: 10.8 FL (ref 6–12)
POTASSIUM SERPL-SCNC: 4.1 MMOL/L (ref 3.5–5.2)
RBC # BLD AUTO: 3.2 10*6/MM3 (ref 3.77–5.28)
SODIUM SERPL-SCNC: 139 MMOL/L (ref 136–145)
WBC # BLD AUTO: 5.42 10*3/MM3 (ref 3.4–10.8)

## 2021-06-26 PROCEDURE — 99024 POSTOP FOLLOW-UP VISIT: CPT | Performed by: THORACIC SURGERY (CARDIOTHORACIC VASCULAR SURGERY)

## 2021-06-26 PROCEDURE — 63710000001 INSULIN DETEMIR PER 5 UNITS: Performed by: INTERNAL MEDICINE

## 2021-06-26 PROCEDURE — 99239 HOSP IP/OBS DSCHRG MGMT >30: CPT | Performed by: INTERNAL MEDICINE

## 2021-06-26 PROCEDURE — 85027 COMPLETE CBC AUTOMATED: CPT | Performed by: INTERNAL MEDICINE

## 2021-06-26 PROCEDURE — 63710000001 INSULIN LISPRO (HUMAN) PER 5 UNITS: Performed by: INTERNAL MEDICINE

## 2021-06-26 PROCEDURE — 25010000002 HEPARIN (PORCINE) PER 1000 UNITS: Performed by: INTERNAL MEDICINE

## 2021-06-26 PROCEDURE — 94799 UNLISTED PULMONARY SVC/PX: CPT

## 2021-06-26 PROCEDURE — 82962 GLUCOSE BLOOD TEST: CPT

## 2021-06-26 PROCEDURE — 80048 BASIC METABOLIC PNL TOTAL CA: CPT | Performed by: INTERNAL MEDICINE

## 2021-06-26 RX ORDER — GUAIFENESIN 600 MG/1
600 TABLET, EXTENDED RELEASE ORAL EVERY 12 HOURS SCHEDULED
Start: 2021-06-26 | End: 2021-11-12

## 2021-06-26 RX ORDER — ALBUTEROL SULFATE 2.5 MG/3ML
2.5 SOLUTION RESPIRATORY (INHALATION) EVERY 6 HOURS PRN
Refills: 12
Start: 2021-06-26 | End: 2021-11-12

## 2021-06-26 RX ORDER — FUROSEMIDE 20 MG/1
20 TABLET ORAL DAILY
Start: 2021-06-26 | End: 2021-08-19

## 2021-06-26 RX ORDER — ISOSORBIDE DINITRATE AND HYDRALAZINE HYDROCHLORIDE 37.5; 2 MG/1; MG/1
1 TABLET ORAL 3 TIMES DAILY
Start: 2021-06-26 | End: 2021-08-06 | Stop reason: SDUPTHER

## 2021-06-26 RX ORDER — IPRATROPIUM BROMIDE AND ALBUTEROL SULFATE 2.5; .5 MG/3ML; MG/3ML
3 SOLUTION RESPIRATORY (INHALATION)
Qty: 360 ML
Start: 2021-06-26 | End: 2021-11-12

## 2021-06-26 RX ORDER — CARVEDILOL 12.5 MG/1
12.5 TABLET ORAL 2 TIMES DAILY WITH MEALS
Start: 2021-06-26 | End: 2021-08-19

## 2021-06-26 RX ORDER — ATORVASTATIN CALCIUM 80 MG/1
80 TABLET, FILM COATED ORAL NIGHTLY
Start: 2021-06-26 | End: 2021-08-19

## 2021-06-26 RX ORDER — ALLOPURINOL 100 MG/1
100 TABLET ORAL DAILY
Start: 2021-06-27 | End: 2021-08-19

## 2021-06-26 RX ORDER — ASPIRIN 81 MG/1
81 TABLET ORAL DAILY
Start: 2021-06-27 | End: 2022-10-12 | Stop reason: HOSPADM

## 2021-06-26 RX ADMIN — FLUTICASONE PROPIONATE 2 SPRAY: 50 SPRAY, METERED NASAL at 08:36

## 2021-06-26 RX ADMIN — ALLOPURINOL 100 MG: 100 TABLET ORAL at 08:34

## 2021-06-26 RX ADMIN — INSULIN LISPRO 3 UNITS: 100 INJECTION, SOLUTION INTRAVENOUS; SUBCUTANEOUS at 12:56

## 2021-06-26 RX ADMIN — DOCUSATE SODIUM 50MG AND SENNOSIDES 8.6MG 2 TABLET: 8.6; 5 TABLET, FILM COATED ORAL at 08:34

## 2021-06-26 RX ADMIN — SODIUM CHLORIDE, PRESERVATIVE FREE 10 ML: 5 INJECTION INTRAVENOUS at 08:35

## 2021-06-26 RX ADMIN — IPRATROPIUM BROMIDE AND ALBUTEROL SULFATE 3 ML: 2.5; .5 SOLUTION RESPIRATORY (INHALATION) at 13:04

## 2021-06-26 RX ADMIN — HYDROXYZINE HYDROCHLORIDE 25 MG: 25 TABLET, FILM COATED ORAL at 14:09

## 2021-06-26 RX ADMIN — CARVEDILOL 12.5 MG: 12.5 TABLET, FILM COATED ORAL at 08:34

## 2021-06-26 RX ADMIN — INSULIN LISPRO 2 UNITS: 100 INJECTION, SOLUTION INTRAVENOUS; SUBCUTANEOUS at 08:35

## 2021-06-26 RX ADMIN — GUAIFENESIN 600 MG: 600 TABLET, EXTENDED RELEASE ORAL at 08:34

## 2021-06-26 RX ADMIN — HEPARIN SODIUM 5000 UNITS: 5000 INJECTION INTRAVENOUS; SUBCUTANEOUS at 06:09

## 2021-06-26 RX ADMIN — IPRATROPIUM BROMIDE AND ALBUTEROL SULFATE 3 ML: 2.5; .5 SOLUTION RESPIRATORY (INHALATION) at 08:26

## 2021-06-26 RX ADMIN — PREGABALIN 100 MG: 100 CAPSULE ORAL at 08:34

## 2021-06-26 RX ADMIN — Medication 250 MG: at 08:34

## 2021-06-26 RX ADMIN — ISOSORBIDE DINITRATE: 20 TABLET ORAL at 06:06

## 2021-06-26 RX ADMIN — FUROSEMIDE 20 MG: 20 TABLET ORAL at 11:05

## 2021-06-26 RX ADMIN — INSULIN DETEMIR 25 UNITS: 100 INJECTION, SOLUTION SUBCUTANEOUS at 08:45

## 2021-06-26 RX ADMIN — ASPIRIN 81 MG: 81 TABLET, COATED ORAL at 08:34

## 2021-06-26 RX ADMIN — INSULIN LISPRO 8 UNITS: 100 INJECTION, SOLUTION INTRAVENOUS; SUBCUTANEOUS at 12:55

## 2021-06-26 RX ADMIN — INSULIN LISPRO 8 UNITS: 100 INJECTION, SOLUTION INTRAVENOUS; SUBCUTANEOUS at 08:34

## 2021-07-15 ENCOUNTER — TRANSCRIBE ORDERS (OUTPATIENT)
Dept: HOME HEALTH SERVICES | Facility: HOME HEALTHCARE | Age: 81
End: 2021-07-15

## 2021-07-15 ENCOUNTER — HOME HEALTH ADMISSION (OUTPATIENT)
Dept: HOME HEALTH SERVICES | Facility: HOME HEALTHCARE | Age: 81
End: 2021-07-15

## 2021-07-15 DIAGNOSIS — I10 ESSENTIAL HYPERTENSION, MALIGNANT: Primary | ICD-10-CM

## 2021-07-16 ENCOUNTER — HOME CARE VISIT (OUTPATIENT)
Dept: HOME HEALTH SERVICES | Facility: HOME HEALTHCARE | Age: 81
End: 2021-07-16

## 2021-07-16 VITALS — DIASTOLIC BLOOD PRESSURE: 64 MMHG | SYSTOLIC BLOOD PRESSURE: 152 MMHG | RESPIRATION RATE: 16 BRPM | HEART RATE: 89 BPM

## 2021-07-16 PROCEDURE — G0495 RN CARE TRAIN/EDU IN HH: HCPCS

## 2021-07-19 ENCOUNTER — HOME CARE VISIT (OUTPATIENT)
Dept: HOME HEALTH SERVICES | Facility: HOME HEALTHCARE | Age: 81
End: 2021-07-19

## 2021-07-19 VITALS
DIASTOLIC BLOOD PRESSURE: 82 MMHG | TEMPERATURE: 98.2 F | OXYGEN SATURATION: 90 % | SYSTOLIC BLOOD PRESSURE: 156 MMHG | HEART RATE: 71 BPM

## 2021-07-19 PROCEDURE — G0152 HHCP-SERV OF OT,EA 15 MIN: HCPCS

## 2021-07-20 ENCOUNTER — HOME CARE VISIT (OUTPATIENT)
Dept: HOME HEALTH SERVICES | Facility: HOME HEALTHCARE | Age: 81
End: 2021-07-20

## 2021-07-20 PROCEDURE — G0299 HHS/HOSPICE OF RN EA 15 MIN: HCPCS

## 2021-07-21 ENCOUNTER — HOME CARE VISIT (OUTPATIENT)
Dept: HOME HEALTH SERVICES | Facility: HOME HEALTHCARE | Age: 81
End: 2021-07-21

## 2021-07-21 VITALS
DIASTOLIC BLOOD PRESSURE: 90 MMHG | HEART RATE: 66 BPM | RESPIRATION RATE: 18 BRPM | TEMPERATURE: 96.9 F | BODY MASS INDEX: 30.73 KG/M2 | OXYGEN SATURATION: 92 % | HEIGHT: 64 IN | SYSTOLIC BLOOD PRESSURE: 160 MMHG | WEIGHT: 180 LBS

## 2021-07-21 PROCEDURE — G0151 HHCP-SERV OF PT,EA 15 MIN: HCPCS

## 2021-07-21 NOTE — HOME HEALTH
"Reason for referral:  Pt hospitalized 6/4-6/26/2021 at [facility] and then went to [facility] for Inpatient Rehab.    Per hospital d/c summary \"Susan Anderson is a 81 y.o. female With a past medical history of hypertension, PVD, T2DM, CKD stage III, and recent osteomyelitis status post right great toe amputation admitted for acute respiratory failure with hypoxia.\"  PMH significant for: acute respiratory failure w/ hypoxia, CAD of arteru bypass graft, mitral valve disease, PNA, acute systolic heart failure, CKD stage III, T2DM, PVD, osteomyelitis, HTN    Pt has hospital bed, manual w/c, some temporary ramps [facility]  Pt reports diff standing on the LLE, \"My muscles aren't strong enough.\" Thinks she was \"on the road to get better there, but needed more time for rehab.\"   Pt stated goals: to get strong enough to transfer on my own, everything is weak, L hip      Upon PT evaluation, pt is significantly weak. She is NWB RLE and is unable to pull to stand at sink using her LLE. PT has concerns that she needs more equipment available for PT treatment and improving her strength, but we will attempt. She is very motivated, has good family and hired caregiver support.  Pt has manual w/c, PWC, hospital bed, walker (she has never used) and is currently getting renovations done for bathroom to make it accessible.    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

## 2021-07-22 VITALS
HEART RATE: 88 BPM | OXYGEN SATURATION: 93 % | SYSTOLIC BLOOD PRESSURE: 130 MMHG | RESPIRATION RATE: 20 BRPM | DIASTOLIC BLOOD PRESSURE: 68 MMHG

## 2021-07-23 ENCOUNTER — HOME CARE VISIT (OUTPATIENT)
Dept: HOME HEALTH SERVICES | Facility: HOME HEALTHCARE | Age: 81
End: 2021-07-23

## 2021-07-23 PROCEDURE — G0299 HHS/HOSPICE OF RN EA 15 MIN: HCPCS

## 2021-07-26 VITALS
OXYGEN SATURATION: 91 % | SYSTOLIC BLOOD PRESSURE: 132 MMHG | DIASTOLIC BLOOD PRESSURE: 70 MMHG | TEMPERATURE: 96.7 F | HEART RATE: 75 BPM

## 2021-07-26 NOTE — HOME HEALTH
Client visit with RN while sitting in her wheelchair. VSS. PICC Line had been removed and dressing change was conducted by daughter the night before RN visit. Client states that her daughter changes bandages nightly. Client states that she doesnt feel she needs RN visits moving forward. Client was not under distress when RN left home.

## 2021-07-28 ENCOUNTER — HOME CARE VISIT (OUTPATIENT)
Dept: HOME HEALTH SERVICES | Facility: HOME HEALTHCARE | Age: 81
End: 2021-07-28

## 2021-07-28 VITALS — DIASTOLIC BLOOD PRESSURE: 72 MMHG | HEART RATE: 67 BPM | SYSTOLIC BLOOD PRESSURE: 140 MMHG | OXYGEN SATURATION: 95 %

## 2021-07-28 VITALS — SYSTOLIC BLOOD PRESSURE: 142 MMHG | DIASTOLIC BLOOD PRESSURE: 64 MMHG | HEART RATE: 78 BPM | RESPIRATION RATE: 16 BRPM

## 2021-07-28 PROCEDURE — G0152 HHCP-SERV OF OT,EA 15 MIN: HCPCS

## 2021-07-28 PROCEDURE — G0495 RN CARE TRAIN/EDU IN HH: HCPCS

## 2021-07-29 ENCOUNTER — HOME CARE VISIT (OUTPATIENT)
Dept: HOME HEALTH SERVICES | Facility: HOME HEALTHCARE | Age: 81
End: 2021-07-29

## 2021-07-29 VITALS
OXYGEN SATURATION: 97 % | RESPIRATION RATE: 18 BRPM | DIASTOLIC BLOOD PRESSURE: 75 MMHG | TEMPERATURE: 97.7 F | SYSTOLIC BLOOD PRESSURE: 187 MMHG | HEART RATE: 74 BPM

## 2021-07-29 PROCEDURE — G0157 HHC PT ASSISTANT EA 15: HCPCS

## 2021-07-30 ENCOUNTER — HOME CARE VISIT (OUTPATIENT)
Dept: HOME HEALTH SERVICES | Facility: HOME HEALTHCARE | Age: 81
End: 2021-07-30

## 2021-07-30 NOTE — CASE COMMUNICATION
Missed visit 7/30/21, spoke with pt last giuseppe, pt was already seen by SN this week, IV ATB are complete, daughter doing wound care, pt declined until next week.

## 2021-08-02 ENCOUNTER — HOME CARE VISIT (OUTPATIENT)
Dept: HOME HEALTH SERVICES | Facility: HOME HEALTHCARE | Age: 81
End: 2021-08-02

## 2021-08-02 VITALS
RESPIRATION RATE: 18 BRPM | OXYGEN SATURATION: 95 % | HEART RATE: 63 BPM | SYSTOLIC BLOOD PRESSURE: 145 MMHG | DIASTOLIC BLOOD PRESSURE: 70 MMHG | TEMPERATURE: 97.5 F

## 2021-08-02 PROCEDURE — G0157 HHC PT ASSISTANT EA 15: HCPCS

## 2021-08-03 ENCOUNTER — HOME CARE VISIT (OUTPATIENT)
Dept: HOME HEALTH SERVICES | Facility: HOME HEALTHCARE | Age: 81
End: 2021-08-03

## 2021-08-03 VITALS
OXYGEN SATURATION: 95 % | SYSTOLIC BLOOD PRESSURE: 128 MMHG | HEART RATE: 67 BPM | DIASTOLIC BLOOD PRESSURE: 70 MMHG | TEMPERATURE: 97.8 F

## 2021-08-03 PROCEDURE — G0152 HHCP-SERV OF OT,EA 15 MIN: HCPCS

## 2021-08-04 ENCOUNTER — OFFICE VISIT (OUTPATIENT)
Dept: CARDIAC SURGERY | Facility: CLINIC | Age: 81
End: 2021-08-04

## 2021-08-04 VITALS
DIASTOLIC BLOOD PRESSURE: 72 MMHG | TEMPERATURE: 97.7 F | OXYGEN SATURATION: 98 % | HEART RATE: 76 BPM | SYSTOLIC BLOOD PRESSURE: 118 MMHG | HEIGHT: 64 IN | BODY MASS INDEX: 30.73 KG/M2 | WEIGHT: 180 LBS

## 2021-08-04 DIAGNOSIS — E11.42 DIABETIC PERIPHERAL NEUROPATHY ASSOCIATED WITH TYPE 2 DIABETES MELLITUS (HCC): ICD-10-CM

## 2021-08-04 DIAGNOSIS — I73.9 PVD (PERIPHERAL VASCULAR DISEASE) (HCC): Primary | ICD-10-CM

## 2021-08-04 PROCEDURE — 99024 POSTOP FOLLOW-UP VISIT: CPT | Performed by: THORACIC SURGERY (CARDIOTHORACIC VASCULAR SURGERY)

## 2021-08-04 RX ORDER — AMOXICILLIN AND CLAVULANATE POTASSIUM 875; 125 MG/1; MG/1
1 TABLET, FILM COATED ORAL 2 TIMES DAILY
COMMUNITY
End: 2021-12-10

## 2021-08-04 RX ORDER — ISOSORBIDE DINITRATE 20 MG/1
TABLET ORAL EVERY 8 HOURS SCHEDULED
COMMUNITY
End: 2021-08-06 | Stop reason: ALTCHOICE

## 2021-08-04 RX ORDER — NUTRITIONAL SUPPLEMENT
POWDER (GRAM) ORAL 2 TIMES DAILY
COMMUNITY
End: 2022-05-13

## 2021-08-05 ENCOUNTER — HOME CARE VISIT (OUTPATIENT)
Dept: HOME HEALTH SERVICES | Facility: HOME HEALTHCARE | Age: 81
End: 2021-08-05

## 2021-08-05 VITALS
HEART RATE: 70 BPM | RESPIRATION RATE: 18 BRPM | SYSTOLIC BLOOD PRESSURE: 136 MMHG | TEMPERATURE: 97.7 F | DIASTOLIC BLOOD PRESSURE: 78 MMHG

## 2021-08-05 PROCEDURE — G0299 HHS/HOSPICE OF RN EA 15 MIN: HCPCS

## 2021-08-05 NOTE — PROGRESS NOTES
"Patient Information  Susan Anderson                                                                                          [address] [place] [address]      1940  [unfilled]  [unfilled]    Chief Complaint   Patient presents with   • Follow-up     Follow up post op. Pt to see ID after this visit, her today for eval of wound. Daily dressing changes since leaving [facility] 21 days ago.        History of Present Illness: Patient seen today for follow-up of a right great toe transmetatarsal amputation performed on April 14, 2021 with infection to his amputation site requiring opening the wound.  This is heal by secondary closure.  Patient also said severe congestive heart failure and bilateral pleural effusions requiring pleural catheter drainage while she is in the hospital in June 2021.  She went to rehabilitation facility at [facility].  She comes back for this first postop visit following this right great toe transmetatarsal amputation of April 14, 2021    Vitals:    08/04/21 1242   BP: 118/72   Pulse: 76   Temp: 97.7 °F (36.5 °C)   SpO2: 98%   Weight: 81.6 kg (180 lb)   Height: 162.6 cm (64\")        Physical Exam amputation site is almost completely healed there is still a small separation of the skin margins.  New dressing was applied.  Patient's chest is clear bilaterally today.    Lab/other results:    Assessment: #1.  Status post right great toe transmetatarsal amputation April 14, 2021 with infection amputation site requiring opening the wound and now heal by secondary closure.  2.  Congestive heart failure with bilateral pleural effusions treated by cardiology.    Plan: We will plan to see the patient back in 1 month for follow-up visit she is remain nonweightbearing at this time.  Valdez Finney M.D."

## 2021-08-05 NOTE — PROGRESS NOTES
[facility] Cardiology    Encounter Date: 2021    Patient ID: Susan Anderson is a 81 y.o. female.  : 1940     PCP: Shaq Ordoñez MD       Chief Complaint: No chief complaint on file.      PROBLEM LIST:  1. Cardiomyopathy/acute on chronic combined systolic/diastolic CHF  a. Echocardiogram, 2021: EF 43%. Global hypokinesis. More pronounced in the anteroseptal wall and apex. Severe mitral annular calcification. Mild to moderate MR. Moderate sized left pleural effusion.  b. [facility], 2021: EF 40-45%. Mild to moderate nonobstructive CAD involving multiple vessels without any evidence of severe or critical disease.  2. Nonobstructive coronary artery disease  a. [facility], 2021:  EF 40-45%. Mild to moderate nonobstructive CAD involving multiple vessels without any evidence of severe or critical disease.  3. Bilateral pleural effusions.  4. Peripheral vascular disease  5. Hypertension   6. CKD, stage III  7. Type II diabetes mellitus   8. Osteomyelitis s/p right great toe amputation    History of Present Illness  Patient presents today for a one month hospital follow-up s/p left heart catheterization with a history of nonobstructive coronary artery disease, cardiomyopathy, acute on chronic combined systolic/diastolic CHF, and cardiac risk factors. Since last visit, her condition has improved and has been doing well overall. She was recently seen by Dr. Finney and Dr. Blunt for follow-up of her right great toe/metatarsal amputation.  States that they were satisfied with her recovery.  She remains on long-term course of antibiotics.  States that her lower extremity edema has improved. She wears oxygen at night using 2 liters. Patient denies chest pain, shortness of breath, palpitations, dizziness, and syncope.       Allergies   Allergen Reactions   • Cephalexin Nausea Only   • Erythromycin Base Nausea Only   • Oxycodone Nausea Only   • Sulfa Antibiotics Nausea Only          Current Outpatient Medications:   •  Accu-Chek FastClix Lancets misc, , Disp: , Rfl:   •  Accu-Chek SmartView test strip, , Disp: , Rfl:   •  albuterol (PROVENTIL) (2.5 MG/3ML) 0.083% nebulizer solution, Take 2.5 mg by nebulization Every 6 (Six) Hours As Needed for Shortness of Air., Disp: , Rfl: 12  •  allopurinol (ZYLOPRIM) 100 MG tablet, Take 1 tablet by mouth Daily. (Patient taking differently: Take 300 mg by mouth Daily.), Disp: , Rfl:   •  amoxicillin-clavulanate (AUGMENTIN) 875-125 MG per tablet, Take 1 tablet by mouth 2 (Two) Times a Day., Disp: , Rfl:   •  aspirin 81 MG EC tablet, Take 1 tablet by mouth Daily., Disp: , Rfl:   •  atorvastatin (LIPITOR) 40 MG tablet, Take 40 mg by mouth 2 (two) times a day., Disp: , Rfl:   •  carvedilol (COREG) 12.5 MG tablet, Take 1 tablet by mouth 2 (Two) Times a Day With Meals., Disp: , Rfl:   •  Cholecalciferol 25 MCG (1000 UT) capsule, Take 1 capsule by mouth Daily., Disp: , Rfl:   •  fluticasone (FLONASE) 50 MCG/ACT nasal spray, 2 sprays by Each Nare route Daily., Disp: , Rfl:   •  guaiFENesin (MUCINEX) 600 MG 12 hr tablet, Take 1 tablet by mouth Every 12 (Twelve) Hours., Disp: , Rfl:   •  insulin lispro (humaLOG) 100 UNIT/ML injection, Inject 8 Units under the skin into the appropriate area as directed 3 (Three) Times a Day With Meals. (Patient taking differently: Inject 8 Units under the skin into the appropriate area as directed 3 (Three) Times a Day With Meals. 2-3 times daily with varying dosages), Disp: , Rfl: 12  •  ipratropium-albuterol (DUO-NEB) 0.5-2.5 mg/3 ml nebulizer, Take 3 mL by nebulization 4 (Four) Times a Day., Disp: 360 mL, Rfl:   •  Nutritional Supplements (Dez) powder, Take  by mouth 2 (Two) Times a Day., Disp: , Rfl:   •  O2 (OXYGEN), Inhale 2 L/min Every Night., Disp: , Rfl:   •  pregabalin (Lyrica) 100 MG capsule, 100mg in the morning and 200mg at night, Disp: 9 capsule, Rfl: 0  •  traMADol (ULTRAM) 50 MG tablet, Take 1 tablet by mouth  3 (Three) Times a Day As Needed., Disp: , Rfl:   •  umeclidinium-vilanterol (Anoro Ellipta) 62.5-25 MCG/INH aerosol powder  inhaler, Inhale 1 puff Daily., Disp: , Rfl:   •  vitamin B-12 (cyanocobalamin) 100 MCG tablet, Take 1 tablet by mouth Daily., Disp: , Rfl:   •  atorvastatin (LIPITOR) 80 MG tablet, Take 1 tablet by mouth Every Night., Disp: , Rfl:   •  dicyclomine (BENTYL) 20 MG tablet, Take 0.5 tablets by mouth 4 (Four) Times a Day As Needed (cramping)., Disp: , Rfl:   •  enoxaparin (LOVENOX) 40 MG/0.4ML solution syringe, Inject  under the skin into the appropriate area as directed Every 12 (Twelve) Hours., Disp: , Rfl:   •  furosemide (LASIX) 20 MG tablet, Take 1 tablet by mouth Daily., Disp: , Rfl:   •  insulin detemir (LEVEMIR) 100 UNIT/ML injection, Inject 25 Units under the skin into the appropriate area as directed Every 12 (Twelve) Hours., Disp: , Rfl: 12  •  insulin lispro (humaLOG) 100 UNIT/ML injection, Inject 0-7 Units under the skin into the appropriate area as directed 3 (Three) Times a Day Before Meals., Disp: , Rfl: 12  •  isosorbide-hydrALAZINE (BIDIL) 20-37.5 MG per tablet, Take 1 tablet by mouth 3 (Three) Times a Day., Disp: 90 tablet, Rfl: 5  •  lidocaine (LIDODERM) 5 %, Place 1 patch on the skin as directed by provider Daily. Remove & Discard patch within 12 hours or as directed by MD, Disp: , Rfl:   •  ondansetron ODT (ZOFRAN-ODT) 4 MG disintegrating tablet, , Disp: , Rfl:   •  saccharomyces boulardii (FLORASTOR) 250 MG capsule, Take 1 capsule by mouth 2 (Two) Times a Day., Disp: , Rfl:   •  sennosides-docusate (senna-docusate sodium) 8.6-50 MG per tablet, Take 2 tablets by mouth 2 (Two) Times a Day As Needed for Constipation., Disp: , Rfl:     The following portions of the patient's history were reviewed and updated as appropriate: allergies, current medications, past family history, past medical history, past social history, past surgical history and problem list.    ROS  Review of  "Systems   Constitution: Negative for chills, fever, fatigue, generalized weakness.   Cardiovascular: Negative for chest pain, dyspnea on exertion, palpitations, orthopnea, and syncope. Positive for leg swelling  Respiratory: Negative for cough, shortness of breath, and wheezing.  HENT: Negative for ear pain, nosebleeds, and tinnitus.  Gastrointestinal: Negative for abdominal pain, constipation, diarrhea, nausea and vomiting.   Genitourinary: No urinary symptoms.  Musculoskeletal: Negative for muscle cramps.  Neurological: Negative for dizziness, headaches, loss of balance, numbness, and symptoms of stroke.  Psychiatric: Normal mental status.     All other systems reviewed and are negative.        Objective:     /56 (BP Location: Right arm, Patient Position: Sitting, Cuff Size: Adult)   Pulse 70   Ht 162.6 cm (64\")   Wt 81.6 kg (180 lb)   SpO2 94%   BMI 30.90 kg/m²    BP repeat: 142/60    Physical Exam  Constitutional: Patient appears well-developed and well-nourished.   HENT: HEENT exam unremarkable.   Neck: Neck supple. No JVD present. No carotid bruits.   Cardiovascular: Normal rate, regular rhythm and normal heart sounds. No murmur heard.   2+ symmetric pulses.   Pulmonary/Chest: Breath sounds normal. Does not exhibit tenderness.   Abdominal: Abdomen benign.   Musculoskeletal: Does not exhibit edema.   Neurological: Neurological exam unremarkable.   Vitals reviewed.    Data Review:   Lab Results   Component Value Date    GLUCOSE 180 (H) 06/26/2021    BUN 36 (H) 06/26/2021    CREATININE 0.94 06/26/2021    EGFRIFNONA 57 (L) 06/26/2021    BCR 38.3 (H) 06/26/2021    K 4.1 06/26/2021    CO2 27.0 06/26/2021    CALCIUM 8.5 (L) 06/26/2021    ALBUMIN 2.40 (L) 06/22/2021    AST 20 06/06/2021    ALT 17 06/06/2021     Lab Results   Component Value Date    CHOL 186 06/04/2021    TRIG 103 06/04/2021    HDL 40 06/04/2021     (H) 06/04/2021      Lab Results   Component Value Date    WBC 5.42 06/26/2021    RBC " 3.20 (L) 06/26/2021    HGB 9.3 (L) 06/26/2021    HCT 30.0 (L) 06/26/2021    MCV 93.8 06/26/2021     06/26/2021     Lab Results   Component Value Date    HGBA1C 10.00 (H) 04/08/2021        Procedures       Assessment:      Diagnosis Plan   1. Coronary artery disease involving native coronary artery of native heart without angina pectoris  nonobstructive disease, continue risk factor management and medical therapy.   2. NICM (nonischemic cardiomyopathy) (CMS/[facility])   EF 40 to 45% continue current dose of carvedilol and Lasix, change back to BiDil instead of separate dosing of hydralazine and Isordil.   3. Acute on chronic combined systolic and diastolic CHF (congestive heart failure) (CMS/[facility])  stable/compensated, continue current medical management.   4. Essential hypertension   elevated, we are adjusting the dose of BiDil.   5. Dyslipidemia   continue high intensity statin therapy.       Plan:   Overall stable cardiac status.  Well compensated from CHF standpoint.  Discontinue hydralazine 10 mg TID and Isordil 20 mg q8h (which were prescribed at rehab) and begin isosorbide-hydralazine 20-37.5 mg TID to more easily keep up with medications.   ACE inhibitor/ARB not prescribed due to kidney disease, to be readdressed by PCP/nephrology.  Continue all other current medications.   FU in 3 MO, sooner as needed.  Thank you for allowing us to participate in the care of your patient.     Scribed for Anjum Ceballos MD by Emma Mcallister. 8/12/2021 11:58 EDT      I, Anjum Ceballos MD, personally performed the services described in this documentation as scribed by the above named individual in my presence, and it is both accurate and complete.  8/12/2021  06:43 EDT        Please note that portions of this note may have been completed with a voice recognition program. Efforts were made to edit the dictations, but occasionally words are mistranscribed.

## 2021-08-05 NOTE — HOME HEALTH
Unwrapped pts right foot great toe amputation. Incision healing nicely. Cleaned with ns. Applied dampened nuguage into wound bed. Covered with 3  4x4s. Wrapped in kerlix. Pt saw surgeon and ID doctor yesterday. She got a good report. They think she is healing well. Continue oral antibiotics and dressing changes.

## 2021-08-06 ENCOUNTER — OFFICE VISIT (OUTPATIENT)
Dept: CARDIOLOGY | Facility: CLINIC | Age: 81
End: 2021-08-06

## 2021-08-06 VITALS
OXYGEN SATURATION: 94 % | WEIGHT: 180 LBS | DIASTOLIC BLOOD PRESSURE: 56 MMHG | SYSTOLIC BLOOD PRESSURE: 148 MMHG | HEIGHT: 64 IN | BODY MASS INDEX: 30.73 KG/M2 | HEART RATE: 70 BPM

## 2021-08-06 DIAGNOSIS — I50.43 ACUTE ON CHRONIC COMBINED SYSTOLIC AND DIASTOLIC CHF (CONGESTIVE HEART FAILURE) (HCC): ICD-10-CM

## 2021-08-06 DIAGNOSIS — E78.5 DYSLIPIDEMIA: ICD-10-CM

## 2021-08-06 DIAGNOSIS — I25.10 CORONARY ARTERY DISEASE INVOLVING NATIVE CORONARY ARTERY OF NATIVE HEART WITHOUT ANGINA PECTORIS: Primary | ICD-10-CM

## 2021-08-06 DIAGNOSIS — I42.8 NICM (NONISCHEMIC CARDIOMYOPATHY) (HCC): ICD-10-CM

## 2021-08-06 DIAGNOSIS — I10 ESSENTIAL HYPERTENSION: ICD-10-CM

## 2021-08-06 PROCEDURE — 99214 OFFICE O/P EST MOD 30 MIN: CPT | Performed by: INTERNAL MEDICINE

## 2021-08-06 RX ORDER — HYDRALAZINE HYDROCHLORIDE 10 MG/1
1 TABLET, FILM COATED ORAL 3 TIMES DAILY
COMMUNITY
End: 2021-08-06 | Stop reason: ALTCHOICE

## 2021-08-06 RX ORDER — ATORVASTATIN CALCIUM 40 MG/1
40 TABLET, FILM COATED ORAL 2 TIMES DAILY
COMMUNITY
End: 2021-11-12

## 2021-08-06 RX ORDER — TRAMADOL HYDROCHLORIDE 50 MG/1
1 TABLET ORAL 3 TIMES DAILY PRN
COMMUNITY
Start: 2021-04-14 | End: 2022-05-13 | Stop reason: ALTCHOICE

## 2021-08-06 RX ORDER — ISOSORBIDE DINITRATE AND HYDRALAZINE HYDROCHLORIDE 37.5; 2 MG/1; MG/1
1 TABLET ORAL 3 TIMES DAILY
Qty: 90 TABLET | Refills: 5
Start: 2021-08-06 | End: 2021-11-15 | Stop reason: SDUPTHER

## 2021-08-06 RX ORDER — UBIDECARENONE 75 MG
1 CAPSULE ORAL DAILY
Status: ON HOLD | COMMUNITY
End: 2023-01-03

## 2021-08-10 ENCOUNTER — HOME CARE VISIT (OUTPATIENT)
Dept: HOME HEALTH SERVICES | Facility: HOME HEALTHCARE | Age: 81
End: 2021-08-10

## 2021-08-10 VITALS
DIASTOLIC BLOOD PRESSURE: 76 MMHG | OXYGEN SATURATION: 94 % | HEART RATE: 74 BPM | SYSTOLIC BLOOD PRESSURE: 152 MMHG | TEMPERATURE: 98.3 F

## 2021-08-10 PROCEDURE — G0152 HHCP-SERV OF OT,EA 15 MIN: HCPCS

## 2021-08-11 ENCOUNTER — HOME CARE VISIT (OUTPATIENT)
Dept: HOME HEALTH SERVICES | Facility: HOME HEALTHCARE | Age: 81
End: 2021-08-11

## 2021-08-11 VITALS — SYSTOLIC BLOOD PRESSURE: 128 MMHG | RESPIRATION RATE: 18 BRPM | HEART RATE: 74 BPM | DIASTOLIC BLOOD PRESSURE: 78 MMHG

## 2021-08-11 PROCEDURE — G0299 HHS/HOSPICE OF RN EA 15 MIN: HCPCS

## 2021-08-12 ENCOUNTER — HOME CARE VISIT (OUTPATIENT)
Dept: HOME HEALTH SERVICES | Facility: HOME HEALTHCARE | Age: 81
End: 2021-08-12

## 2021-08-12 VITALS
HEART RATE: 71 BPM | TEMPERATURE: 97.4 F | DIASTOLIC BLOOD PRESSURE: 60 MMHG | OXYGEN SATURATION: 95 % | SYSTOLIC BLOOD PRESSURE: 140 MMHG | RESPIRATION RATE: 18 BRPM

## 2021-08-12 PROBLEM — I42.8 NICM (NONISCHEMIC CARDIOMYOPATHY): Status: ACTIVE | Noted: 2021-08-12

## 2021-08-12 PROBLEM — I25.810 CAD (CORONARY ARTERY DISEASE) OF ARTERY BYPASS GRAFT: Status: RESOLVED | Noted: 2021-06-21 | Resolved: 2021-08-12

## 2021-08-12 PROBLEM — E78.5 DYSLIPIDEMIA: Status: ACTIVE | Noted: 2021-08-12

## 2021-08-12 PROBLEM — I50.43 ACUTE ON CHRONIC COMBINED SYSTOLIC AND DIASTOLIC CHF (CONGESTIVE HEART FAILURE): Status: ACTIVE | Noted: 2021-08-12

## 2021-08-12 PROBLEM — I25.10 CORONARY ARTERY DISEASE INVOLVING NATIVE CORONARY ARTERY OF NATIVE HEART WITHOUT ANGINA PECTORIS: Status: ACTIVE | Noted: 2021-08-12

## 2021-08-12 PROBLEM — I50.21 ACUTE SYSTOLIC HEART FAILURE: Status: RESOLVED | Noted: 2021-06-04 | Resolved: 2021-08-12

## 2021-08-12 PROCEDURE — G0157 HHC PT ASSISTANT EA 15: HCPCS

## 2021-08-16 ENCOUNTER — HOME CARE VISIT (OUTPATIENT)
Dept: HOME HEALTH SERVICES | Facility: HOME HEALTHCARE | Age: 81
End: 2021-08-16

## 2021-08-16 VITALS
TEMPERATURE: 97.8 F | SYSTOLIC BLOOD PRESSURE: 126 MMHG | OXYGEN SATURATION: 92 % | HEART RATE: 65 BPM | DIASTOLIC BLOOD PRESSURE: 70 MMHG

## 2021-08-16 PROCEDURE — G0152 HHCP-SERV OF OT,EA 15 MIN: HCPCS

## 2021-08-18 ENCOUNTER — HOME CARE VISIT (OUTPATIENT)
Dept: HOME HEALTH SERVICES | Facility: HOME HEALTHCARE | Age: 81
End: 2021-08-18

## 2021-08-18 PROCEDURE — G0157 HHC PT ASSISTANT EA 15: HCPCS

## 2021-08-18 PROCEDURE — G0299 HHS/HOSPICE OF RN EA 15 MIN: HCPCS

## 2021-08-19 ENCOUNTER — OFFICE VISIT (OUTPATIENT)
Dept: ENDOCRINOLOGY | Facility: CLINIC | Age: 81
End: 2021-08-19

## 2021-08-19 VITALS
HEIGHT: 64 IN | DIASTOLIC BLOOD PRESSURE: 68 MMHG | BODY MASS INDEX: 30.9 KG/M2 | HEART RATE: 68 BPM | OXYGEN SATURATION: 96 % | SYSTOLIC BLOOD PRESSURE: 126 MMHG | RESPIRATION RATE: 12 BRPM

## 2021-08-19 DIAGNOSIS — E11.65 TYPE 2 DIABETES MELLITUS WITH HYPERGLYCEMIA, WITH LONG-TERM CURRENT USE OF INSULIN (HCC): Primary | ICD-10-CM

## 2021-08-19 DIAGNOSIS — Z79.4 TYPE 2 DIABETES MELLITUS WITH HYPERGLYCEMIA, WITH LONG-TERM CURRENT USE OF INSULIN (HCC): Primary | ICD-10-CM

## 2021-08-19 DIAGNOSIS — E78.5 DYSLIPIDEMIA: ICD-10-CM

## 2021-08-19 LAB
EXPIRATION DATE: ABNORMAL
GLUCOSE BLDC GLUCOMTR-MCNC: 295 MG/DL (ref 70–130)
HBA1C MFR BLD: 7.6 %
Lab: ABNORMAL

## 2021-08-19 PROCEDURE — 82947 ASSAY GLUCOSE BLOOD QUANT: CPT | Performed by: INTERNAL MEDICINE

## 2021-08-19 PROCEDURE — 83036 HEMOGLOBIN GLYCOSYLATED A1C: CPT | Performed by: INTERNAL MEDICINE

## 2021-08-19 PROCEDURE — 99204 OFFICE O/P NEW MOD 45 MIN: CPT | Performed by: INTERNAL MEDICINE

## 2021-08-19 RX ORDER — AMMONIUM LACTATE 12 G/100G
LOTION TOPICAL
COMMUNITY
Start: 2021-07-15 | End: 2022-04-21

## 2021-08-19 RX ORDER — ISOSORBIDE DINITRATE 20 MG/1
TABLET ORAL
COMMUNITY
End: 2021-11-12

## 2021-08-19 RX ORDER — INSULIN GLARGINE 100 [IU]/ML
30 INJECTION, SOLUTION SUBCUTANEOUS DAILY
Qty: 10 ML | Refills: 5 | Status: SHIPPED | OUTPATIENT
Start: 2021-08-19 | End: 2022-03-22 | Stop reason: SDUPTHER

## 2021-08-19 RX ORDER — ALLOPURINOL 300 MG/1
TABLET ORAL
COMMUNITY
Start: 2021-07-27 | End: 2022-03-22 | Stop reason: SDUPTHER

## 2021-08-19 RX ORDER — FUROSEMIDE 40 MG/1
TABLET ORAL
COMMUNITY
End: 2021-11-12

## 2021-08-19 NOTE — PROGRESS NOTES
Chief Complaint   Patient presents with   • Diabetes        New patient who is being seen in consultation regarding diabetes at the request of Shaq Ordoñez MD     HPI   Susan Anderson is a 81 y.o. female who presents for evaluation of diabetes.    Patient has a history of type 2 diabetes.  She reports a longstanding history of diabetes which she states was historically well controlled on insulin 70/30 but reports that she had alterations in her schedule including dietary changes as well as changes in amount of exercise throughout Covid and glycemic control deteriorated.  She has had multiple hospitalizations this year, she had transmetatarsal amputation of the right great toe in April 2021 was admitted again in June and required additional debridement.  She reports that this is now healing well.  She reports that her insulin therapy was changed to a different regimen upon discharge to rehab but states that she resumed her usual 70/30 when she returned home.  She has difficulty telling me exactly how she doses her 70/30 and states that she frequently gives additional units if her blood sugar is greater than 100.  She estimates that she is using between 60 and 80 units total per day in divided doses.  Of note, following discharge from hospitalization, patient was taking Levemir 25 units twice daily, Humalog 8 units with meals plus correction.    Patient reports hypoglycemia awareness.  She denies any blood sugar values less than 80 since returning home after hospitalization  Patient monitors blood glucose generally 2-3 times daily.  Meter not available for review today.  Patient reports wide amount of glycemic variability with values ranging  over the past several weeks  Patient does not follow a diabetic diet.  Patient does not exercise regularly.  Patient is currently nonweightbearing following surgery.  She will have regular evaluation approximately 1 month to determine if she is able to bear weight  again.    History of dyslipidemia: Yes patient taking atorvastatin 40 mg twice daily  History of renal dysfunction: Yes  History of Hypertension: Yes  Patient reports neuropathy, prescribed Lyrica by her PCP    Past Medical History:   Diagnosis Date   • Asthma 6/4 - double pneumonia    Currently on inhaler and nebulizer   • Cancer (CMS/[facility])     cervical cancer, skin cancer   • CHF (congestive heart failure) (CMS/[facility]) June 4, 2021   • Chronic kidney disease Related to diabetes   • Coronary artery disease 6/4/2021 DX for hear failure   • Diabetes mellitus (CMS/[facility]) 30 years    Seeing Dr. Lam 1st time Aug 19   • Gout    • Hyperlipidemia Reference current labs x 2-3yrs approx   • Hypertension 30 years   • Mitral valve disease    • Mitral valve disease    • Osteomyelitis (CMS/[facility])    • Peripheral neuropathy    • Sleep apnea    • Type 2 diabetes mellitus (CMS/[facility])     30 years     Past Surgical History:   Procedure Laterality Date   • ABDOMINAL HYSTERECTOMY W/SALPINGECTOMY     • AMPUTATION  Right great toe, 1st 1/3 metatarsal -[facility] 4/14/21   • AORTAGRAM N/A 4/9/2021    Procedure: AORTAGRAM WITH OR WITHOUT RUNOFFS, WITH Co2;  Surgeon: Trey Forrest MD;  Location:  [facility]BR[facility];  Service: Vascular;  Laterality: N/A;   • APPENDECTOMY     • BRAIN TUMOR EXCISION      laser surgery    • CARDIAC CATHETERIZATION N/A 6/21/2021    Procedure: LEFT HEART CATH;  Surgeon: Anjum Ceballos MD;  Location:  [facility] CATH INVASIVE LOCATION;  Service: Cardiology;  Laterality: N/A;   • CATARACT EXTRACTION W/ INTRAOCULAR LENS  IMPLANT, BILATERAL     • CHOLECYSTECTOMY     • HYSTERECTOMY     • TRANS METATARSAL AMPUTATION Right 4/14/2021    Procedure: AMPUTATION TRANS METATARSAL RIGHT GREAT TOE;  Surgeon: Valdez Finney MD;  Location:  [facility] OR;  Service: Vascular;  Laterality: Right;      Family History   Problem Relation Age of Onset   • Diabetes Mother         Type II   • Heart attack Father    • Coronary artery disease Father    •  Diabetes Father         Type II      Social History     Socioeconomic History   • Marital status:      Spouse name: Not on file   • Number of children: 1   • Years of education: Not on file   • Highest education level: Not on file   Tobacco Use   • Smoking status: Never Smoker   • Smokeless tobacco: Never Used   Vaping Use   • Vaping Use: Never used   Substance and Sexual Activity   • Alcohol use: Not Currently     Alcohol/week: 0.0 standard drinks     Comment: Social drinking when not recovering from hospitalization   • Drug use: Never   • Sexual activity: Not Currently     Birth control/protection: None      Allergies   Allergen Reactions   • Cephalexin Nausea Only   • Erythromycin Base Nausea Only   • Oxycodone Nausea Only   • Sulfa Antibiotics Nausea Only      Current Outpatient Medications on File Prior to Visit   Medication Sig Dispense Refill   • Accu-Chek FastClix Lancets misc      • Accu-Chek SmartView test strip      • albuterol (PROVENTIL) (2.5 MG/3ML) 0.083% nebulizer solution Take 2.5 mg by nebulization Every 6 (Six) Hours As Needed for Shortness of Air.  12   • allopurinol (ZYLOPRIM) 300 MG tablet      • ammonium lactate (LAC-HYDRIN) 12 % lotion      • amoxicillin-clavulanate (AUGMENTIN) 875-125 MG per tablet Take 1 tablet by mouth 2 (Two) Times a Day.     • aspirin 81 MG EC tablet Take 1 tablet by mouth Daily.     • atorvastatin (LIPITOR) 40 MG tablet Take 40 mg by mouth 2 (two) times a day.     • fluticasone (FLONASE) 50 MCG/ACT nasal spray 2 sprays by Each Nare route Daily.     • furosemide (LASIX) 40 MG tablet furosemide 40 mg tablet   Take 1 tablet every day by oral route.     • glucose blood test strip Accu-Chek SmartView Test Strips   USE TO TEST BLOOD SUGAR THREE TIMES A DAY     • glucose blood test strip Accu-Chek SmartView Test Strips     • guaiFENesin (MUCINEX) 600 MG 12 hr tablet Take 1 tablet by mouth Every 12 (Twelve) Hours.     • insulin detemir (LEVEMIR) 100 UNIT/ML injection  Inject 25 Units under the skin into the appropriate area as directed Every 12 (Twelve) Hours.  12   • insulin lispro (humaLOG) 100 UNIT/ML injection Inject 0-7 Units under the skin into the appropriate area as directed 3 (Three) Times a Day Before Meals.  12   • insulin lispro (humaLOG) 100 UNIT/ML injection Inject 8 Units under the skin into the appropriate area as directed 3 (Three) Times a Day With Meals. (Patient taking differently: Inject 8 Units under the skin into the appropriate area as directed 3 (Three) Times a Day With Meals. 2-3 times daily with varying dosages)  12   • ipratropium-albuterol (DUO-NEB) 0.5-2.5 mg/3 ml nebulizer Take 3 mL by nebulization 4 (Four) Times a Day. 360 mL    • isosorbide dinitrate (ISORDIL) 20 MG tablet isosorbide dinitrate 20 mg tablet   Take 1 tablet 3 times a day by oral route.     • isosorbide-hydrALAZINE (BIDIL) 20-37.5 MG per tablet Take 1 tablet by mouth 3 (Three) Times a Day. 90 tablet 5   • Nutritional Supplements (Dez) powder Take  by mouth 2 (Two) Times a Day.     • O2 (OXYGEN) Inhale 2 L/min Every Night.     • pregabalin (Lyrica) 100 MG capsule 100mg in the morning and 200mg at night 9 capsule 0   • traMADol (ULTRAM) 50 MG tablet Take 1 tablet by mouth 3 (Three) Times a Day As Needed.     • umeclidinium-vilanterol (Anoro Ellipta) 62.5-25 MCG/INH aerosol powder  inhaler Inhale 1 puff Daily.     • vitamin B-12 (cyanocobalamin) 100 MCG tablet Take 1 tablet by mouth Daily.     • [DISCONTINUED] allopurinol (ZYLOPRIM) 100 MG tablet Take 1 tablet by mouth Daily. (Patient taking differently: Take 300 mg by mouth Daily.)     • [DISCONTINUED] atorvastatin (LIPITOR) 80 MG tablet Take 1 tablet by mouth Every Night.     • [DISCONTINUED] furosemide (LASIX) 20 MG tablet Take 1 tablet by mouth Daily.     • [DISCONTINUED] carvedilol (COREG) 12.5 MG tablet Take 1 tablet by mouth 2 (Two) Times a Day With Meals.     • [DISCONTINUED] Cholecalciferol 25 MCG (1000 UT) capsule Take 1  "capsule by mouth Daily.     • [DISCONTINUED] dicyclomine (BENTYL) 20 MG tablet Take 0.5 tablets by mouth 4 (Four) Times a Day As Needed (cramping).     • [DISCONTINUED] enoxaparin (LOVENOX) 40 MG/0.4ML solution syringe Inject  under the skin into the appropriate area as directed Every 12 (Twelve) Hours.     • [DISCONTINUED] lidocaine (LIDODERM) 5 % Place 1 patch on the skin as directed by provider Daily. Remove & Discard patch within 12 hours or as directed by MD     • [DISCONTINUED] ondansetron ODT (ZOFRAN-ODT) 4 MG disintegrating tablet      • [DISCONTINUED] saccharomyces boulardii (FLORASTOR) 250 MG capsule Take 1 capsule by mouth 2 (Two) Times a Day.     • [DISCONTINUED] sennosides-docusate (senna-docusate sodium) 8.6-50 MG per tablet Take 2 tablets by mouth 2 (Two) Times a Day As Needed for Constipation.       No current facility-administered medications on file prior to visit.        Review of Systems   Constitutional: Negative for unexpected weight gain and unexpected weight loss.   HENT: Positive for hearing loss and sinus pressure.    Cardiovascular: Positive for leg swelling. Negative for palpitations.   Gastrointestinal: Negative for constipation and diarrhea.   Endocrine: Positive for polyuria. Negative for polydipsia and polyphagia.   Musculoskeletal: Positive for arthralgias and back pain.   Skin: Positive for skin lesions. Negative for rash.   Neurological: Positive for dizziness and numbness.   Hematological: Bruises/bleeds easily.   Psychiatric/Behavioral: Negative for sleep disturbance. The patient is not nervous/anxious.       Vitals:    08/19/21 1430   BP: 126/68   Pulse: 68   Resp: 12   SpO2: 96%   Height: 162.6 cm (64\")   Body mass index is 30.9 kg/m².     Physical Exam  Vitals reviewed.   Constitutional:       General: She is not in acute distress.     Appearance: She is obese.   HENT:      Head: Normocephalic and atraumatic.      Right Ear: Decreased hearing noted.      Left Ear: Decreased " hearing noted.      Nose: Nose normal.   Eyes:      General: Lids are normal.      Conjunctiva/sclera: Conjunctivae normal.   Neck:      Thyroid: No thyromegaly or thyroid tenderness.   Cardiovascular:      Rate and Rhythm: Normal rate and regular rhythm.      Heart sounds: No murmur heard.     Pulmonary:      Effort: Pulmonary effort is normal.      Breath sounds: Normal breath sounds.   Abdominal:      General: Bowel sounds are normal.      Palpations: Abdomen is soft.      Tenderness: There is no abdominal tenderness.   Musculoskeletal:      Comments: Seated in wheelchair  Right foot with dressing in place   Lymphadenopathy:      Head:      Right side of head: No submandibular adenopathy.      Left side of head: No submandibular adenopathy.      Cervical: No cervical adenopathy.   Skin:     General: Skin is warm and dry.      Findings: Lesion present.   Neurological:      General: No focal deficit present.      Mental Status: She is alert.   Psychiatric:         Mood and Affect: Mood and affect normal.         Behavior: Behavior is cooperative.        Labs/Imaging  Results for ROZINA ELLIS (MRN [id number]) as of 8/19/2021 16:59   Ref. Range 8/19/2021 14:40 8/19/2021 14:43   Glucose Latest Ref Range: 70 - 130 mg/dL 295 (A)    Hemoglobin A1C Latest Units: %  7.6       Assessment and Plan    Diagnoses and all orders for this visit:    1. Type 2 diabetes mellitus with hyperglycemia, with long-term current use of insulin (CMS/[facility]) (Primary)  -Uncontrolled with hemoglobin A1c of 7.6%, hyperglycemic in office today  -No home data available for review patient reports wide amount of glycemic variation.  Discussed that I suspect her variable blood glucose is secondary to varying doses of 70/30.  Discussed that attempting to use correctional doses of premixed insulin can result in hypoglycemia and is not ideal.  After reviewing options, patient agreeable to transition to traditional basal- bolus therapy.  -Patient given  written instructions for insulin changes.  She will start Lantus 30 units daily, Humalog 10 units with meals plus correction 2 for 50 greater than 150  -Patient will stop insulin 70/30  -Patient was advised to monitor blood sugar 3 times daily.  Patient did express potential interest in CGM.  Criteria for coverage reviewed, patient reports that she will contact her insurance for more information.  Patient was instructed to bring glucometer to all future appointments. Patient should contact the clinic between appointments with hypoglycemia or persistent hyperglycemia.  Discussed signs and symptoms of hypoglycemia as well as hypoglycemia management via the rule of 15's.  Discussed potential for long-term complications with uncontrolled diabetes including nephropathy, neuropathy, retinopathy, increased risk for cardiac disease.  Discussed the role of diet and exercise in the management of diabetes.  eGFR 48 in June 2021  Hepatic function up-to-date from June 2021  Lipid panel up-to-date from June 2021, , triglycerides 93  -     POC Glucose, Blood  -     POC Glycosylated Hemoglobin (Hb A1C)    2. Dyslipidemia  -Patient currently taking atorvastatin 40 mg twice daily  -Lipid panel up-to-date from June 2021      Return in about 3 months (around 11/19/2021). The patient was instructed to contact the clinic with any interval questions or concerns.    Janeth Lam MD     Please note that portions of this document were completed using a voice recognition program. Efforts were made to edit the dictations, but occasionally words are mis-transcribed.

## 2021-08-20 ENCOUNTER — PATIENT ROUNDING (BHMG ONLY) (OUTPATIENT)
Dept: ENDOCRINOLOGY | Facility: CLINIC | Age: 81
End: 2021-08-20

## 2021-08-20 VITALS
SYSTOLIC BLOOD PRESSURE: 124 MMHG | HEART RATE: 80 BPM | RESPIRATION RATE: 18 BRPM | TEMPERATURE: 98 F | DIASTOLIC BLOOD PRESSURE: 60 MMHG | OXYGEN SATURATION: 93 %

## 2021-08-20 NOTE — PROGRESS NOTES
August 20, 2021    Hello, may I speak with Susan Anderson?    My name is ZACK    I am  with [facility]  [address] [address] [phone].    Before we get started may I verify your date of birth? 1940    I am calling to officially welcome you to our practice and ask about your recent visit. Is this a good time to talk? YES     Tell me about your visit with us. What things went well?  VISIT WENT VERY WELL       We're always looking for ways to make our patients' experiences even better. Do you have recommendations on ways we may improve?  NO EVERYTHING WENT WELL-I STARTED MY NEW MEDS THIS MORNING AND SO FAR SO GOOD    Overall were you satisfied with your first visit to our practice? YES        I appreciate you taking the time to speak with me today. Is there anything else I can do for you? NO      Thank you, and have a great day.

## 2021-08-24 ENCOUNTER — HOME CARE VISIT (OUTPATIENT)
Dept: HOME HEALTH SERVICES | Facility: HOME HEALTHCARE | Age: 81
End: 2021-08-24

## 2021-08-24 VITALS
SYSTOLIC BLOOD PRESSURE: 138 MMHG | DIASTOLIC BLOOD PRESSURE: 62 MMHG | OXYGEN SATURATION: 95 % | TEMPERATURE: 98.3 F | HEART RATE: 62 BPM

## 2021-08-24 PROCEDURE — G0152 HHCP-SERV OF OT,EA 15 MIN: HCPCS

## 2021-08-25 ENCOUNTER — HOME CARE VISIT (OUTPATIENT)
Dept: HOME HEALTH SERVICES | Facility: HOME HEALTHCARE | Age: 81
End: 2021-08-25

## 2021-08-25 ENCOUNTER — EDUCATION (OUTPATIENT)
Dept: DIABETES SERVICES | Facility: HOSPITAL | Age: 81
End: 2021-08-25

## 2021-08-25 VITALS
RESPIRATION RATE: 16 BRPM | DIASTOLIC BLOOD PRESSURE: 59 MMHG | SYSTOLIC BLOOD PRESSURE: 116 MMHG | TEMPERATURE: 96.8 F | OXYGEN SATURATION: 93 % | HEART RATE: 57 BPM

## 2021-08-25 PROCEDURE — G0151 HHCP-SERV OF PT,EA 15 MIN: HCPCS

## 2021-08-25 NOTE — CONSULTS
DIABETES EDUCATION CONSULT, 30 minute telephone call for a 6 month follow up visit.   This medical referred consult was provided as a telephone call, tele-health or e-visit, as patient is unable to attend an in-office appointment due to the COVID-19 crisis. Consent for treatment was given verbally.Please see media tab for assessment and notes if you use EP[employer]. If you are not an EPIC user a copy of patient's assessment and notes will be sent per routine. Thank you.

## 2021-08-26 NOTE — HOME HEALTH
Ms. Anderson has appointment with Dr. Medeiros next Wednesday.   PT Reassessment completed this date. Pt has improved in overall strength and functional mobility; able to transfer from w/c to bed and w/c to lift chair with min to mod a from therapist via squat pivot transfer. Pt able to transfer from bed (with increased height) to w/c with SBA. Pt stood from lift chair (lifted to highest position) to FWW with min a and stood 90 sec + 30 sec with rest break between with cues to maintain RLE NWB.

## 2021-08-27 ENCOUNTER — HOME CARE VISIT (OUTPATIENT)
Dept: HOME HEALTH SERVICES | Facility: HOME HEALTHCARE | Age: 81
End: 2021-08-27

## 2021-08-27 VITALS
TEMPERATURE: 97.4 F | HEART RATE: 78 BPM | DIASTOLIC BLOOD PRESSURE: 62 MMHG | SYSTOLIC BLOOD PRESSURE: 122 MMHG | RESPIRATION RATE: 16 BRPM

## 2021-08-27 PROCEDURE — G0495 RN CARE TRAIN/EDU IN HH: HCPCS

## 2021-09-01 ENCOUNTER — OFFICE VISIT (OUTPATIENT)
Dept: CARDIAC SURGERY | Facility: CLINIC | Age: 81
End: 2021-09-01

## 2021-09-01 VITALS
HEART RATE: 57 BPM | SYSTOLIC BLOOD PRESSURE: 150 MMHG | BODY MASS INDEX: 31.07 KG/M2 | DIASTOLIC BLOOD PRESSURE: 70 MMHG | WEIGHT: 182 LBS | OXYGEN SATURATION: 100 % | HEIGHT: 64 IN | TEMPERATURE: 97.3 F

## 2021-09-01 DIAGNOSIS — I96 GANGRENE OF FOOT (HCC): ICD-10-CM

## 2021-09-01 DIAGNOSIS — I73.9 PVD (PERIPHERAL VASCULAR DISEASE) (HCC): Primary | ICD-10-CM

## 2021-09-01 DIAGNOSIS — E11.42 DIABETIC PERIPHERAL NEUROPATHY ASSOCIATED WITH TYPE 2 DIABETES MELLITUS (HCC): ICD-10-CM

## 2021-09-01 PROCEDURE — 99024 POSTOP FOLLOW-UP VISIT: CPT | Performed by: THORACIC SURGERY (CARDIOTHORACIC VASCULAR SURGERY)

## 2021-09-02 ENCOUNTER — HOME CARE VISIT (OUTPATIENT)
Dept: HOME HEALTH SERVICES | Facility: HOME HEALTHCARE | Age: 81
End: 2021-09-02

## 2021-09-02 PROCEDURE — G0152 HHCP-SERV OF OT,EA 15 MIN: HCPCS

## 2021-09-02 NOTE — PROGRESS NOTES
"Patient Information  Susan Anderson                                                                                          [address] [place] [address]      1940  [unfilled]  [unfilled]    Chief Complaint   Patient presents with   • Post-op Follow-up     1 MO FU Right Great Toe Amputation 4/14/21-Ulceration       History of Present Illness: Patient seen today for follow-up of right great toe transmetatarsal amputation performed April 14, 2021.  Patient developed some infection at the amputation site requiring open the wound and heal by secondary closure she is last seen on August 4, 2021 which time the amputation site is almost completely healed with small separation of the skin margins more distally.  Wet to damp dressings were continued.  She comes back today for follow-up having no complaints    Vitals:    09/01/21 1402   BP: 150/70   BP Location: Right arm   Patient Position: Sitting   Pulse: 57   Temp: 97.3 °F (36.3 °C)   SpO2: 100%   Weight: 82.6 kg (182 lb)   Height: 162.6 cm (64\")        Physical Exam examination revealed almost complete closure of this amputation site healing by secondary closure.  There is still a small area/fissure in the distal part of the incision but no drainage noted.  Lab/other results:    Assessment: #1.  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  The infection amputation site requiring reopen wound and now heal by secondary closure.  2.  Congestive heart failure and bilateral pleural effusions in the past treated by bilateral chest catheter drainage.  This is resolved with medical management.    Plan: Continue with wet to damp dressing to this amputation site.  Do not try to pack into the depths of  the wound I told the patient.  We will see her back in 1 month for follow-up visit.  She is still to remain nonweightbearing the interim.    Valdez Finney M.D.   "

## 2021-09-03 ENCOUNTER — HOME CARE VISIT (OUTPATIENT)
Dept: HOME HEALTH SERVICES | Facility: HOME HEALTHCARE | Age: 81
End: 2021-09-03

## 2021-09-03 VITALS
TEMPERATURE: 97 F | HEART RATE: 68 BPM | SYSTOLIC BLOOD PRESSURE: 122 MMHG | RESPIRATION RATE: 16 BRPM | DIASTOLIC BLOOD PRESSURE: 66 MMHG

## 2021-09-03 VITALS — OXYGEN SATURATION: 93 % | HEART RATE: 68 BPM | DIASTOLIC BLOOD PRESSURE: 68 MMHG | SYSTOLIC BLOOD PRESSURE: 140 MMHG

## 2021-09-03 PROCEDURE — G0495 RN CARE TRAIN/EDU IN HH: HCPCS

## 2021-09-03 PROCEDURE — G0151 HHCP-SERV OF PT,EA 15 MIN: HCPCS

## 2021-09-04 VITALS
OXYGEN SATURATION: 97 % | SYSTOLIC BLOOD PRESSURE: 111 MMHG | RESPIRATION RATE: 16 BRPM | HEART RATE: 60 BPM | TEMPERATURE: 97.8 F | DIASTOLIC BLOOD PRESSURE: 67 MMHG

## 2021-09-07 NOTE — CASE COMMUNICATION
D/C HHPT at this time with all goals met.  Will plan to resume PT when WB status is upgraded and able to gait train.

## 2021-09-08 ENCOUNTER — HOME CARE VISIT (OUTPATIENT)
Dept: HOME HEALTH SERVICES | Facility: HOME HEALTHCARE | Age: 81
End: 2021-09-08

## 2021-09-08 VITALS
TEMPERATURE: 98 F | HEART RATE: 58 BPM | OXYGEN SATURATION: 94 % | SYSTOLIC BLOOD PRESSURE: 110 MMHG | DIASTOLIC BLOOD PRESSURE: 64 MMHG

## 2021-09-08 PROCEDURE — G0152 HHCP-SERV OF OT,EA 15 MIN: HCPCS

## 2021-09-09 ENCOUNTER — HOME CARE VISIT (OUTPATIENT)
Dept: HOME HEALTH SERVICES | Facility: HOME HEALTHCARE | Age: 81
End: 2021-09-09

## 2021-09-09 VITALS
TEMPERATURE: 97.7 F | HEART RATE: 65 BPM | DIASTOLIC BLOOD PRESSURE: 70 MMHG | SYSTOLIC BLOOD PRESSURE: 138 MMHG | OXYGEN SATURATION: 94 % | RESPIRATION RATE: 16 BRPM

## 2021-09-09 PROCEDURE — G0299 HHS/HOSPICE OF RN EA 15 MIN: HCPCS

## 2021-09-15 ENCOUNTER — HOME CARE VISIT (OUTPATIENT)
Dept: HOME HEALTH SERVICES | Facility: HOME HEALTHCARE | Age: 81
End: 2021-09-15

## 2021-09-15 VITALS
DIASTOLIC BLOOD PRESSURE: 65 MMHG | OXYGEN SATURATION: 94 % | HEART RATE: 55 BPM | RESPIRATION RATE: 16 BRPM | SYSTOLIC BLOOD PRESSURE: 127 MMHG | TEMPERATURE: 97.7 F

## 2021-09-15 PROCEDURE — G0299 HHS/HOSPICE OF RN EA 15 MIN: HCPCS

## 2021-09-22 ENCOUNTER — HOME CARE VISIT (OUTPATIENT)
Dept: HOME HEALTH SERVICES | Facility: HOME HEALTHCARE | Age: 81
End: 2021-09-22

## 2021-09-22 VITALS
RESPIRATION RATE: 16 BRPM | OXYGEN SATURATION: 94 % | TEMPERATURE: 97.7 F | SYSTOLIC BLOOD PRESSURE: 164 MMHG | DIASTOLIC BLOOD PRESSURE: 85 MMHG | HEART RATE: 67 BPM

## 2021-09-22 PROCEDURE — G0299 HHS/HOSPICE OF RN EA 15 MIN: HCPCS

## 2021-09-29 ENCOUNTER — HOME CARE VISIT (OUTPATIENT)
Dept: HOME HEALTH SERVICES | Facility: HOME HEALTHCARE | Age: 81
End: 2021-09-29

## 2021-09-29 PROCEDURE — G0299 HHS/HOSPICE OF RN EA 15 MIN: HCPCS

## 2021-10-03 VITALS
RESPIRATION RATE: 16 BRPM | TEMPERATURE: 97.6 F | DIASTOLIC BLOOD PRESSURE: 65 MMHG | OXYGEN SATURATION: 97 % | HEART RATE: 88 BPM | SYSTOLIC BLOOD PRESSURE: 124 MMHG

## 2021-10-08 ENCOUNTER — HOME CARE VISIT (OUTPATIENT)
Dept: HOME HEALTH SERVICES | Facility: HOME HEALTHCARE | Age: 81
End: 2021-10-08

## 2021-10-08 VITALS
RESPIRATION RATE: 16 BRPM | TEMPERATURE: 97 F | SYSTOLIC BLOOD PRESSURE: 132 MMHG | OXYGEN SATURATION: 97 % | DIASTOLIC BLOOD PRESSURE: 66 MMHG | HEART RATE: 78 BPM

## 2021-10-08 PROCEDURE — G0495 RN CARE TRAIN/EDU IN HH: HCPCS

## 2021-10-13 ENCOUNTER — HOME CARE VISIT (OUTPATIENT)
Dept: HOME HEALTH SERVICES | Facility: HOME HEALTHCARE | Age: 81
End: 2021-10-13

## 2021-10-13 VITALS
OXYGEN SATURATION: 95 % | SYSTOLIC BLOOD PRESSURE: 119 MMHG | RESPIRATION RATE: 16 BRPM | HEART RATE: 58 BPM | TEMPERATURE: 97.8 F | DIASTOLIC BLOOD PRESSURE: 58 MMHG

## 2021-10-13 PROCEDURE — G0299 HHS/HOSPICE OF RN EA 15 MIN: HCPCS

## 2021-10-20 ENCOUNTER — HOME CARE VISIT (OUTPATIENT)
Dept: HOME HEALTH SERVICES | Facility: HOME HEALTHCARE | Age: 81
End: 2021-10-20

## 2021-10-20 VITALS
TEMPERATURE: 97.7 F | RESPIRATION RATE: 16 BRPM | HEART RATE: 64 BPM | SYSTOLIC BLOOD PRESSURE: 146 MMHG | DIASTOLIC BLOOD PRESSURE: 73 MMHG | OXYGEN SATURATION: 93 %

## 2021-10-20 PROCEDURE — G0299 HHS/HOSPICE OF RN EA 15 MIN: HCPCS

## 2021-10-27 ENCOUNTER — HOME CARE VISIT (OUTPATIENT)
Dept: HOME HEALTH SERVICES | Facility: HOME HEALTHCARE | Age: 81
End: 2021-10-27

## 2021-10-27 VITALS
TEMPERATURE: 98.1 F | OXYGEN SATURATION: 92 % | RESPIRATION RATE: 16 BRPM | SYSTOLIC BLOOD PRESSURE: 106 MMHG | DIASTOLIC BLOOD PRESSURE: 59 MMHG | HEART RATE: 51 BPM

## 2021-10-27 PROCEDURE — G0299 HHS/HOSPICE OF RN EA 15 MIN: HCPCS

## 2021-11-03 ENCOUNTER — HOME CARE VISIT (OUTPATIENT)
Dept: HOME HEALTH SERVICES | Facility: HOME HEALTHCARE | Age: 81
End: 2021-11-03

## 2021-11-03 VITALS
TEMPERATURE: 97.7 F | SYSTOLIC BLOOD PRESSURE: 150 MMHG | HEART RATE: 62 BPM | RESPIRATION RATE: 16 BRPM | DIASTOLIC BLOOD PRESSURE: 68 MMHG | OXYGEN SATURATION: 97 %

## 2021-11-03 PROCEDURE — G0299 HHS/HOSPICE OF RN EA 15 MIN: HCPCS

## 2021-11-04 ENCOUNTER — LAB (OUTPATIENT)
Dept: LAB | Facility: HOSPITAL | Age: 81
End: 2021-11-04

## 2021-11-04 DIAGNOSIS — L01.00 IMPETIGO DUE TO STAPHYLOCOCCUS AUREUS: Primary | ICD-10-CM

## 2021-11-04 DIAGNOSIS — L02.611 ABSCESS OF RIGHT FOOT: ICD-10-CM

## 2021-11-04 DIAGNOSIS — L03.031 ABSCESS OF FIFTH TOENAIL OF RIGHT FOOT: ICD-10-CM

## 2021-11-04 DIAGNOSIS — B95.61 IMPETIGO DUE TO STAPHYLOCOCCUS AUREUS: Primary | ICD-10-CM

## 2021-11-04 DIAGNOSIS — L03.115 CELLULITIS OF RIGHT FOOT: ICD-10-CM

## 2021-11-04 LAB
ALBUMIN SERPL-MCNC: 3.4 G/DL (ref 3.5–5.2)
ALBUMIN/GLOB SERPL: 1.1 G/DL
ALP SERPL-CCNC: 123 U/L (ref 39–117)
ALT SERPL W P-5'-P-CCNC: 33 U/L (ref 1–33)
ANION GAP SERPL CALCULATED.3IONS-SCNC: 11 MMOL/L (ref 5–15)
AST SERPL-CCNC: 33 U/L (ref 1–32)
BASOPHILS # BLD AUTO: 0.03 10*3/MM3 (ref 0–0.2)
BASOPHILS NFR BLD AUTO: 0.5 % (ref 0–1.5)
BILIRUB SERPL-MCNC: 1 MG/DL (ref 0–1.2)
BUN SERPL-MCNC: 59 MG/DL (ref 8–23)
BUN/CREAT SERPL: 50 (ref 7–25)
CALCIUM SPEC-SCNC: 9.1 MG/DL (ref 8.6–10.5)
CHLORIDE SERPL-SCNC: 98 MMOL/L (ref 98–107)
CO2 SERPL-SCNC: 28 MMOL/L (ref 22–29)
CREAT SERPL-MCNC: 1.18 MG/DL (ref 0.57–1)
DEPRECATED RDW RBC AUTO: 53.3 FL (ref 37–54)
EOSINOPHIL # BLD AUTO: 0.23 10*3/MM3 (ref 0–0.4)
EOSINOPHIL NFR BLD AUTO: 4 % (ref 0.3–6.2)
ERYTHROCYTE [DISTWIDTH] IN BLOOD BY AUTOMATED COUNT: 16.8 % (ref 12.3–15.4)
GFR SERPL CREATININE-BSD FRML MDRD: 44 ML/MIN/1.73
GLOBULIN UR ELPH-MCNC: 3.1 GM/DL
GLUCOSE SERPL-MCNC: 185 MG/DL (ref 65–99)
HCT VFR BLD AUTO: 37.6 % (ref 34–46.6)
HGB BLD-MCNC: 12.3 G/DL (ref 12–15.9)
IMM GRANULOCYTES # BLD AUTO: 0 10*3/MM3 (ref 0–0.05)
IMM GRANULOCYTES NFR BLD AUTO: 0 % (ref 0–0.5)
LYMPHOCYTES # BLD AUTO: 1.16 10*3/MM3 (ref 0.7–3.1)
LYMPHOCYTES NFR BLD AUTO: 20.3 % (ref 19.6–45.3)
MCH RBC QN AUTO: 28.5 PG (ref 26.6–33)
MCHC RBC AUTO-ENTMCNC: 32.7 G/DL (ref 31.5–35.7)
MCV RBC AUTO: 87.2 FL (ref 79–97)
MONOCYTES # BLD AUTO: 0.46 10*3/MM3 (ref 0.1–0.9)
MONOCYTES NFR BLD AUTO: 8.1 % (ref 5–12)
NEUTROPHILS NFR BLD AUTO: 3.83 10*3/MM3 (ref 1.7–7)
NEUTROPHILS NFR BLD AUTO: 67.1 % (ref 42.7–76)
NRBC BLD AUTO-RTO: 0 /100 WBC (ref 0–0.2)
PLATELET # BLD AUTO: 204 10*3/MM3 (ref 140–450)
PMV BLD AUTO: 12 FL (ref 6–12)
POTASSIUM SERPL-SCNC: 3.9 MMOL/L (ref 3.5–5.2)
PROT SERPL-MCNC: 6.5 G/DL (ref 6–8.5)
RBC # BLD AUTO: 4.31 10*6/MM3 (ref 3.77–5.28)
SODIUM SERPL-SCNC: 137 MMOL/L (ref 136–145)
WBC # BLD AUTO: 5.71 10*3/MM3 (ref 3.4–10.8)

## 2021-11-04 PROCEDURE — 85025 COMPLETE CBC W/AUTO DIFF WBC: CPT

## 2021-11-04 PROCEDURE — 80053 COMPREHEN METABOLIC PANEL: CPT

## 2021-11-04 PROCEDURE — 84439 ASSAY OF FREE THYROXINE: CPT

## 2021-11-04 PROCEDURE — 36415 COLL VENOUS BLD VENIPUNCTURE: CPT

## 2021-11-04 PROCEDURE — 84443 ASSAY THYROID STIM HORMONE: CPT

## 2021-11-05 ENCOUNTER — LAB (OUTPATIENT)
Dept: LAB | Facility: HOSPITAL | Age: 81
End: 2021-11-05

## 2021-11-05 ENCOUNTER — TRANSCRIBE ORDERS (OUTPATIENT)
Dept: LAB | Facility: HOSPITAL | Age: 81
End: 2021-11-05

## 2021-11-05 DIAGNOSIS — L02.611 ABSCESS OF RIGHT FOOT: ICD-10-CM

## 2021-11-05 DIAGNOSIS — R68.89 MECHANICAL AND MOTOR PROBLEMS WITH INTERNAL ORGANS: Primary | ICD-10-CM

## 2021-11-05 DIAGNOSIS — M86.171 ACUTE OSTEOMYELITIS OF RIGHT ANKLE OR FOOT (HCC): ICD-10-CM

## 2021-11-05 DIAGNOSIS — R68.89 MECHANICAL AND MOTOR PROBLEMS WITH INTERNAL ORGANS: ICD-10-CM

## 2021-11-05 LAB
T4 FREE SERPL-MCNC: 1.26 NG/DL (ref 0.93–1.7)
TSH SERPL DL<=0.05 MIU/L-ACNC: 2.97 UIU/ML (ref 0.27–4.2)

## 2021-11-05 PROCEDURE — 84439 ASSAY OF FREE THYROXINE: CPT

## 2021-11-05 PROCEDURE — 84443 ASSAY THYROID STIM HORMONE: CPT

## 2021-11-10 ENCOUNTER — HOME CARE VISIT (OUTPATIENT)
Dept: HOME HEALTH SERVICES | Facility: HOME HEALTHCARE | Age: 81
End: 2021-11-10

## 2021-11-10 ENCOUNTER — OFFICE VISIT (OUTPATIENT)
Dept: CARDIAC SURGERY | Facility: CLINIC | Age: 81
End: 2021-11-10

## 2021-11-10 VITALS
DIASTOLIC BLOOD PRESSURE: 75 MMHG | SYSTOLIC BLOOD PRESSURE: 134 MMHG | OXYGEN SATURATION: 96 % | HEART RATE: 60 BPM | RESPIRATION RATE: 16 BRPM | TEMPERATURE: 98.1 F

## 2021-11-10 VITALS
WEIGHT: 182 LBS | TEMPERATURE: 97.5 F | OXYGEN SATURATION: 96 % | HEIGHT: 64 IN | BODY MASS INDEX: 31.07 KG/M2 | DIASTOLIC BLOOD PRESSURE: 70 MMHG | SYSTOLIC BLOOD PRESSURE: 150 MMHG | HEART RATE: 59 BPM

## 2021-11-10 DIAGNOSIS — I96 GANGRENE OF FOOT (HCC): ICD-10-CM

## 2021-11-10 DIAGNOSIS — I73.9 PVD (PERIPHERAL VASCULAR DISEASE) (HCC): Primary | ICD-10-CM

## 2021-11-10 PROCEDURE — G0299 HHS/HOSPICE OF RN EA 15 MIN: HCPCS

## 2021-11-10 PROCEDURE — 99212 OFFICE O/P EST SF 10 MIN: CPT | Performed by: THORACIC SURGERY (CARDIOTHORACIC VASCULAR SURGERY)

## 2021-11-11 NOTE — PROGRESS NOTES
[facility]    Encounter Date: 2021    Patient ID: Susan Anderson is a 81 y.o. female.  : 1940     PCP: Shaq Ordoñez MD       Chief Complaint: Coronary Artery Disease (3 month f/u)      PROBLEM LIST:  1. Cardiomyopathy/acute on chronic combined systolic/diastolic CHF  a. Echocardiogram, 2021: EF 43%. Global hypokinesis. More pronounced in the anteroseptal wall and apex. Severe mitral annular calcification. Mild to moderate MR. Moderate sized left pleural effusion.  b. [facility], 2021: EF 40-45%. Mild to moderate nonobstructive CAD involving multiple vessels without any evidence of severe or critical disease.  2. Nonobstructive coronary artery disease  a. [facility], 2021:  EF 40-45%. Mild to moderate nonobstructive CAD involving multiple vessels without any evidence of severe or critical disease.  3. Bilateral pleural effusions.  4. Peripheral vascular disease  5. Hypertension   6. CKD, stage III  7. Type II diabetes mellitus   8. Osteomyelitis s/p right great toe amputation    History of Present Illness  Patient presents today for a 3 month follow-up with a history of coronary artery disease, cardiomyopathy, acute on chronic combined systolic/diastolic CHF, and cardiac risk factors. Since last visit, she has now been cleared by Dr. Medeiros to be weightbearing and participate in physical therapy.  The patient was newly diagnosed with cardiomyopathy/heart failure in  of last year during hospitalization for a right foot infection and wench amputation of her big toe and part of her metatarsals was performed.  She is seeing Dr. Bolaños he with infectious disease who is recommended long-term antibiotics.  The patient underwent cardiac catheterization back during that hospitalization which showed mild to moderate nonobstructive CAD.  The patient reports her breathing has been stable.  She has no swelling in her legs.  She denies any chest pain, palpitations,  orthopnea, or syncope.    Allergies   Allergen Reactions   • Cephalexin Nausea Only   • Erythromycin Base Nausea Only   • Oxycodone Nausea Only   • Sulfa Antibiotics Nausea Only         Current Outpatient Medications:   •  Accu-Chek FastClix Lancets misc, , Disp: , Rfl:   •  Accu-Chek SmartView test strip, , Disp: , Rfl:   •  allopurinol (ZYLOPRIM) 300 MG tablet, , Disp: , Rfl:   •  ammonium lactate (LAC-HYDRIN) 12 % lotion, , Disp: , Rfl:   •  amoxicillin-clavulanate (AUGMENTIN) 875-125 MG per tablet, Take 1 tablet by mouth 2 (Two) Times a Day., Disp: , Rfl:   •  aspirin 81 MG EC tablet, Take 1 tablet by mouth Daily., Disp: , Rfl:   •  fluticasone (FLONASE) 50 MCG/ACT nasal spray, 2 sprays by Each Nare route Daily., Disp: , Rfl:   •  furosemide (LASIX) 40 MG tablet, Take 0.5 tablets by mouth Daily., Disp: 90 tablet, Rfl:   •  glucose blood test strip, Accu-Chek SmartView Test Strips  USE TO TEST BLOOD SUGAR THREE TIMES A DAY, Disp: , Rfl:   •  glucose blood test strip, Accu-Chek SmartView Test Strips, Disp: , Rfl:   •  insulin glargine (Lantus) 100 UNIT/ML injection, Inject 30 Units under the skin into the appropriate area as directed Daily., Disp: 10 mL, Rfl: 5  •  insulin lispro (HumaLOG) 100 UNIT/ML injection, For use at mealtimes and per correctional scale, MDD 50 units, Disp: 20 mL, Rfl: 5  •  isosorbide-hydrALAZINE (BIDIL) 20-37.5 MG per tablet, Take 1 tablet by mouth 3 (Three) Times a Day., Disp: 90 tablet, Rfl: 5  •  Nutritional Supplements (Dez) powder, Take  by mouth 2 (Two) Times a Day., Disp: , Rfl:   •  O2 (OXYGEN), Inhale 2 L/min Every Night., Disp: , Rfl:   •  pregabalin (Lyrica) 100 MG capsule, 100mg in the morning and 200mg at night, Disp: 9 capsule, Rfl: 0  •  traMADol (ULTRAM) 50 MG tablet, Take 1 tablet by mouth 3 (Three) Times a Day As Needed., Disp: , Rfl:   •  vitamin B-12 (cyanocobalamin) 100 MCG tablet, Take 1 tablet by mouth Daily., Disp: , Rfl:   •  atorvastatin (Lipitor) 80 MG tablet,  "Take 1 tablet by mouth Daily., Disp: 90 tablet, Rfl: 0  •  carvedilol (COREG) 12.5 MG tablet, Take 1 tablet by mouth 2 (Two) Times a Day., Disp: 60 tablet, Rfl: 11    The following portions of the patient's history were reviewed and updated as appropriate: allergies, current medications, past family history, past medical history, past social history, past surgical history and problem list.    ROS  Review of Systems   Constitution: Negative for chills, fever, fatigue, generalized weakness.   Cardiovascular: Negative for chest pain, dyspnea on exertion, leg swelling, palpitations, orthopnea, and syncope.   Respiratory: Negative for cough, shortness of breath, and wheezing.  HENT: Negative for ear pain, nosebleeds, and tinnitus.  Gastrointestinal: Negative for abdominal pain, constipation, diarrhea, nausea and vomiting.   Genitourinary: No urinary symptoms.  Musculoskeletal: Negative for muscle cramps.  Neurological: Negative for dizziness, headaches, loss of balance, numbness, and symptoms of stroke.  Psychiatric: Normal mental status.     All other systems reviewed and are negative.        Objective:     /62   Pulse 55   Ht 162.6 cm (64\")   Wt 81.6 kg (180 lb)   SpO2 99%   BMI 30.90 kg/m²      Physical Exam  Constitutional: Patient appears well-developed and well-nourished.   HENT: HEENT exam unremarkable.   Neck: Neck supple. No JVD present. No carotid bruits.   Cardiovascular: Normal rate, regular rhythm and normal heart sounds. No murmur heard.   2+ symmetric pulses.   Pulmonary/Chest: Breath sounds normal. Does not exhibit tenderness.   Abdominal: Abdomen benign.   Musculoskeletal: Does not exhibit edema.   Neurological: Neurological exam unremarkable.   Vitals reviewed.    Data Review:   Lab Results   Component Value Date    GLUCOSE 185 (H) 11/04/2021    BUN 59 (H) 11/04/2021    CREATININE 1.18 (H) 11/04/2021    EGFRIFNONA 44 (L) 11/04/2021    BCR 50.0 (H) 11/04/2021    K 3.9 11/04/2021    CO2 28.0 " 11/04/2021    CALCIUM 9.1 11/04/2021    ALBUMIN 3.40 (L) 11/04/2021    AST 33 (H) 11/04/2021    ALT 33 11/04/2021     Lab Results   Component Value Date    CHOL 186 06/04/2021    TRIG 103 06/04/2021    HDL 40 06/04/2021     (H) 06/04/2021      Lab Results   Component Value Date    WBC 5.71 11/04/2021    RBC 4.31 11/04/2021    HGB 12.3 11/04/2021    HCT 37.6 11/04/2021    MCV 87.2 11/04/2021     11/04/2021     Lab Results   Component Value Date    TSH 2.970 11/04/2021     Lab Results   Component Value Date    HGBA1C 7.6 08/19/2021        Procedures       Assessment:      Diagnosis Plan   1. NICM (nonischemic cardiomyopathy) (HCC)   stable NYHA class II symptoms  Continue guideline directed medical therapy with Coreg, BiDil and Lasix.  Defer ACE/ARB/Entresto due to chronic kidney disease   2. Mitral valve disease   stable NYHA class II symptoms   3. Essential hypertension   blood pressures are controlled   4. Dyslipidemia   continue Lipitor 80 mg daily   5. Coronary artery disease involving native coronary artery of native heart without angina pectoris   continue aspirin 81 mg daily.  No signs or symptoms of angina   6. Chronic combined systolic and diastolic heart failure (HCC)   stable NYHA class II symptoms.  Continue Coreg, BiDil and Lasix.  Defer ACE/ARB/Entresto due to chronic kidney disease     Plan:   Patient is doing well from a cardiovascular standpoint.  Her heart failure symptoms are stable and she has no signs or symptoms of angina.  Ideally would like to for her to be on Entresto but will defer due to her chronic kidney disease.  Continue current medications.   FU in 6 MO, sooner as needed.  Thank you for allowing us to participate in the care of your patient.         INDIGO Zavala    Part of this note may be an electronic transcription/translation of spoken language to printed text using the Dragon Dictation System.

## 2021-11-11 NOTE — PROGRESS NOTES
"Patient Information  Susan Anderson                                                                                          [address] [place] [address]      1940  [unfilled]  [unfilled]    Chief Complaint   Patient presents with   • Follow-up     1 MO FU for Right Great Toe Amputation 4/14/21-Ulceration       History of Present Illness: Patient seen follow-up of right great toe amputation formed performed on April 14, 2021 for diabetic gangrene ulceration..  Patient had a long rehabilitation process and did have dehiscence of this incision from infection which had to be opened and heal by secondary intent.  Of note is the fact the patient now has severe congestive heart failure and had to have thoracentesis/placement of a pleural drainage catheter bilaterally t with drained on several occasions while in the hospital in April.  Patient's last seen on September 1, 2021 at which time wet to damp dressings were continued to this open dehisced area.    Vitals:    11/10/21 1455   BP: 150/70   BP Location: Left arm   Patient Position: Sitting   Pulse: 59   Temp: 97.5 °F (36.4 °C)   SpO2: 96%   Weight: 82.6 kg (182 lb)   Height: 162.6 cm (64\")        Physical Exam complete closure/epithelialization of the amputation stump area.    Lab/other results:    Assessment: #1.  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  Amputation stump required reopening secondary to abscess now secondary closure complete #2  2.  Congestive heart failure with bilateral pleural effusions treated by chest catheter drainage has eventually resolved by medical therapy    Plan: Patient may now start physical therapy and weightbearing the right foot.  We will see the patient on a as needed basis.    Valdez Finney M.D.   "

## 2021-11-12 ENCOUNTER — TELEPHONE (OUTPATIENT)
Dept: CARDIAC SURGERY | Facility: CLINIC | Age: 81
End: 2021-11-12

## 2021-11-12 ENCOUNTER — OFFICE VISIT (OUTPATIENT)
Dept: CARDIOLOGY | Facility: CLINIC | Age: 81
End: 2021-11-12

## 2021-11-12 VITALS
HEIGHT: 64 IN | DIASTOLIC BLOOD PRESSURE: 62 MMHG | HEART RATE: 55 BPM | BODY MASS INDEX: 30.73 KG/M2 | OXYGEN SATURATION: 99 % | WEIGHT: 180 LBS | SYSTOLIC BLOOD PRESSURE: 120 MMHG

## 2021-11-12 DIAGNOSIS — E78.5 DYSLIPIDEMIA: ICD-10-CM

## 2021-11-12 DIAGNOSIS — I05.9 MITRAL VALVE DISEASE: ICD-10-CM

## 2021-11-12 DIAGNOSIS — I25.10 CORONARY ARTERY DISEASE INVOLVING NATIVE CORONARY ARTERY OF NATIVE HEART WITHOUT ANGINA PECTORIS: ICD-10-CM

## 2021-11-12 DIAGNOSIS — I50.42 CHRONIC COMBINED SYSTOLIC AND DIASTOLIC HEART FAILURE (HCC): ICD-10-CM

## 2021-11-12 DIAGNOSIS — I10 ESSENTIAL HYPERTENSION: ICD-10-CM

## 2021-11-12 DIAGNOSIS — I42.8 NICM (NONISCHEMIC CARDIOMYOPATHY) (HCC): Primary | ICD-10-CM

## 2021-11-12 PROBLEM — J96.01 ACUTE RESPIRATORY FAILURE WITH HYPOXIA: Status: RESOLVED | Noted: 2021-06-05 | Resolved: 2021-11-12

## 2021-11-12 PROCEDURE — 99214 OFFICE O/P EST MOD 30 MIN: CPT | Performed by: NURSE PRACTITIONER

## 2021-11-12 RX ORDER — CARVEDILOL 12.5 MG/1
12.5 TABLET ORAL 2 TIMES DAILY
Qty: 60 TABLET | Refills: 11 | Status: SHIPPED | OUTPATIENT
Start: 2021-11-12 | End: 2022-08-12 | Stop reason: HOSPADM

## 2021-11-12 RX ORDER — FUROSEMIDE 40 MG/1
20 TABLET ORAL DAILY
Qty: 90 TABLET
Start: 2021-11-12 | End: 2022-06-01

## 2021-11-12 RX ORDER — ATORVASTATIN CALCIUM 80 MG/1
80 TABLET, FILM COATED ORAL DAILY
Qty: 90 TABLET | Refills: 0 | Status: SHIPPED | OUTPATIENT
Start: 2021-11-12 | End: 2022-04-18

## 2021-11-12 NOTE — TELEPHONE ENCOUNTER
Grisel from [facility] called to info this office that Mrs. Davis's wounds have healed completely and HH has d/c the wound care but would like to reinstates the OT and PT for this pt. Verbal ok per GFE to due so.

## 2021-11-13 ENCOUNTER — PATIENT MESSAGE (OUTPATIENT)
Dept: CARDIOLOGY | Facility: CLINIC | Age: 81
End: 2021-11-13

## 2021-11-15 RX ORDER — ISOSORBIDE DINITRATE AND HYDRALAZINE HYDROCHLORIDE 37.5; 2 MG/1; MG/1
1 TABLET ORAL 3 TIMES DAILY
Qty: 270 TABLET | Refills: 3
Start: 2021-11-15 | End: 2021-11-18

## 2021-11-16 ENCOUNTER — HOME CARE VISIT (OUTPATIENT)
Dept: HOME HEALTH SERVICES | Facility: HOME HEALTHCARE | Age: 81
End: 2021-11-16

## 2021-11-16 VITALS
OXYGEN SATURATION: 96 % | TEMPERATURE: 98.6 F | HEART RATE: 59 BPM | DIASTOLIC BLOOD PRESSURE: 70 MMHG | SYSTOLIC BLOOD PRESSURE: 112 MMHG

## 2021-11-16 PROCEDURE — G0152 HHCP-SERV OF OT,EA 15 MIN: HCPCS

## 2021-11-17 ENCOUNTER — HOME CARE VISIT (OUTPATIENT)
Dept: HOME HEALTH SERVICES | Facility: HOME HEALTHCARE | Age: 81
End: 2021-11-17

## 2021-11-17 VITALS
SYSTOLIC BLOOD PRESSURE: 138 MMHG | TEMPERATURE: 97.5 F | OXYGEN SATURATION: 98 % | DIASTOLIC BLOOD PRESSURE: 60 MMHG | HEART RATE: 60 BPM

## 2021-11-17 PROCEDURE — G0151 HHCP-SERV OF PT,EA 15 MIN: HCPCS

## 2021-11-18 ENCOUNTER — HOME CARE VISIT (OUTPATIENT)
Dept: HOME HEALTH SERVICES | Facility: HOME HEALTHCARE | Age: 81
End: 2021-11-18

## 2021-11-18 PROCEDURE — G0299 HHS/HOSPICE OF RN EA 15 MIN: HCPCS

## 2021-11-18 RX ORDER — ISOSORBIDE DINITRATE AND HYDRALAZINE HYDROCHLORIDE 37.5; 2 MG/1; MG/1
1 TABLET ORAL 3 TIMES DAILY
Qty: 270 TABLET | Refills: 3 | Status: SHIPPED | OUTPATIENT
Start: 2021-11-18 | End: 2022-02-02 | Stop reason: CLARIF

## 2021-11-19 NOTE — HOME HEALTH
"RN visit conducted while client sitting in wheel chair. She has a bandage on her right foot from an amputation. Client states she will be seeing the infection disease MD Stef Soares today (7/20/21) and requests RN not to change bandage because \"he will be changing it.\" VSS stable and client states she has no pain. She has a caregiver with her to assist with medications and ADLS. Tomorrow (7/21/21) client is scheduled for appointment with Dr Medeiros. Balaji showed no signs of distress when RN left appointment." 5

## 2021-11-21 VITALS
DIASTOLIC BLOOD PRESSURE: 81 MMHG | RESPIRATION RATE: 16 BRPM | SYSTOLIC BLOOD PRESSURE: 124 MMHG | OXYGEN SATURATION: 96 % | HEART RATE: 83 BPM | TEMPERATURE: 98 F

## 2021-11-23 ENCOUNTER — HOME CARE VISIT (OUTPATIENT)
Dept: HOME HEALTH SERVICES | Facility: HOME HEALTHCARE | Age: 81
End: 2021-11-23

## 2021-11-23 VITALS — SYSTOLIC BLOOD PRESSURE: 148 MMHG | DIASTOLIC BLOOD PRESSURE: 75 MMHG | HEART RATE: 63 BPM | OXYGEN SATURATION: 96 %

## 2021-11-23 PROCEDURE — G0152 HHCP-SERV OF OT,EA 15 MIN: HCPCS

## 2021-11-23 PROCEDURE — G0151 HHCP-SERV OF PT,EA 15 MIN: HCPCS

## 2021-11-23 NOTE — HOME HEALTH
OT session focused on functional mobility and completing ADL's with walker. Patient tolerated therapy session well, but with increased standing/walking she reports getting low back pain and needing to sit down. OT plans to continue to focus on completing ADL's only using walker, and eventually getting rid of w/c.

## 2021-11-24 VITALS
SYSTOLIC BLOOD PRESSURE: 118 MMHG | DIASTOLIC BLOOD PRESSURE: 86 MMHG | RESPIRATION RATE: 18 BRPM | HEART RATE: 86 BPM | TEMPERATURE: 97 F

## 2021-11-24 NOTE — HOME HEALTH
Very pleasant 80 yo female familiar to our services now WBAT and ready for more PT. Pt's goal is to return to walking. Agreeable to PT 1x/wk and agreeable to be compliant with progressive HEP

## 2021-11-24 NOTE — HOME HEALTH
Pt rec'd for routine PT visit, agreeable to treatment, denies pain.  Reports she was awake early this AM and then went back to bed.  Treatment today focused on BLE strengthening in sitting and then gait training  Plan for next treatment to include standing endurance with various static stances for muscular endurance and balance. Eventually, when she has improved BLE strength, standing strengthening in open chain will be appropriate.

## 2021-11-30 ENCOUNTER — HOME CARE VISIT (OUTPATIENT)
Dept: HOME HEALTH SERVICES | Facility: HOME HEALTHCARE | Age: 81
End: 2021-11-30

## 2021-11-30 VITALS — SYSTOLIC BLOOD PRESSURE: 140 MMHG | DIASTOLIC BLOOD PRESSURE: 69 MMHG | HEART RATE: 65 BPM | OXYGEN SATURATION: 96 %

## 2021-11-30 PROCEDURE — G0151 HHCP-SERV OF PT,EA 15 MIN: HCPCS

## 2021-11-30 PROCEDURE — G0152 HHCP-SERV OF OT,EA 15 MIN: HCPCS

## 2021-11-30 NOTE — HOME HEALTH
OT session focused on functional mobilty with rx to and from bathroom to comple bathroom transfers, reviewing HEP, and transfer training. Patient continues to improve with ambulation now that she is able to WB, and increase independence in the home. OT plans to continue to focus on functional mobility, and ADL retraining with a focus on IADL tasks in the next visit.

## 2021-12-06 ENCOUNTER — HOME CARE VISIT (OUTPATIENT)
Dept: HOME HEALTH SERVICES | Facility: HOME HEALTHCARE | Age: 81
End: 2021-12-06

## 2021-12-06 VITALS
SYSTOLIC BLOOD PRESSURE: 130 MMHG | HEART RATE: 63 BPM | OXYGEN SATURATION: 99 % | DIASTOLIC BLOOD PRESSURE: 80 MMHG | RESPIRATION RATE: 20 BRPM

## 2021-12-06 VITALS
RESPIRATION RATE: 18 BRPM | OXYGEN SATURATION: 96 % | TEMPERATURE: 97.7 F | DIASTOLIC BLOOD PRESSURE: 62 MMHG | HEART RATE: 58 BPM | SYSTOLIC BLOOD PRESSURE: 127 MMHG

## 2021-12-06 VITALS — OXYGEN SATURATION: 96 % | HEART RATE: 64 BPM | SYSTOLIC BLOOD PRESSURE: 146 MMHG | DIASTOLIC BLOOD PRESSURE: 67 MMHG

## 2021-12-06 PROCEDURE — G0151 HHCP-SERV OF PT,EA 15 MIN: HCPCS

## 2021-12-06 PROCEDURE — G0152 HHCP-SERV OF OT,EA 15 MIN: HCPCS

## 2021-12-06 NOTE — HOME HEALTH
Pt rec'd for PT routine visit.   Pt reports walking with walker when no one is here at the home with her. PT strongly encouraged pt to ONLY walk when somone is here because of increased risk of falls. Pt still wants to walk at will. PT again discouraged walking without someone present in the home. Also edu provided re: need to have some way to keep phone on her for safety.

## 2021-12-06 NOTE — HOME HEALTH
Pt rec'd for routine PT visit. Weather is mild and an opportunity to practice entering/exiting home and care transfer. Pt and caregiver agreeable to this plan for treatment today.  Next treatment will cont to promote improved tolerance to standing/stepping/walking.

## 2021-12-07 NOTE — HOME HEALTH
OT session focused on completing IADL tasks, mostly light meal prep using walker in kitchen area, upgrading UB HEP, and LBD task. Patient tolerated therapy session well. She has increased her independence with all goals at this time, and OT plans to discharge next visit.

## 2021-12-10 PROCEDURE — U0004 COV-19 TEST NON-CDC HGH THRU: HCPCS | Performed by: FAMILY MEDICINE

## 2021-12-12 ENCOUNTER — TELEPHONE (OUTPATIENT)
Dept: URGENT CARE | Facility: CLINIC | Age: 81
End: 2021-12-12

## 2021-12-12 NOTE — TELEPHONE ENCOUNTER
----- Message from Sheron Fitzpatrick DNP, APRN sent at 12/12/2021  8:54 AM EST -----  All negative covid     ----- Message -----  From: Lab, Background User  Sent: 12/11/2021   5:23 PM EST  To:  [address] Covid Results

## 2021-12-13 ENCOUNTER — HOME CARE VISIT (OUTPATIENT)
Dept: HOME HEALTH SERVICES | Facility: HOME HEALTHCARE | Age: 81
End: 2021-12-13

## 2021-12-13 VITALS — SYSTOLIC BLOOD PRESSURE: 157 MMHG | OXYGEN SATURATION: 94 % | HEART RATE: 63 BPM | DIASTOLIC BLOOD PRESSURE: 75 MMHG

## 2021-12-13 PROCEDURE — G0152 HHCP-SERV OF OT,EA 15 MIN: HCPCS

## 2021-12-13 NOTE — HOME HEALTH
OT discharge completed this date. Patient has met all goals and is independent with ADL's, and functional mobility in the home. She uses w/c, and intermittent use of walker. She still has caregivers but not as frequent. Patient plans to transition to outpatient when HHPT discharges. Patient was seen in Urgent Care over the weekend due to not feeling well, and was diagnosed with an upper respiratory infection and was negative for COVID 19. Patient is being discharged in good condition, and will remain under MD care.

## 2021-12-15 ENCOUNTER — TELEPHONE (OUTPATIENT)
Dept: URGENT CARE | Facility: CLINIC | Age: 81
End: 2021-12-15

## 2021-12-15 NOTE — TELEPHONE ENCOUNTER
12/15/2021 called and verified birth date, and informed her that her covid test was negative.  She said she was feeling better.  jade

## 2021-12-16 ENCOUNTER — HOME CARE VISIT (OUTPATIENT)
Dept: HOME HEALTH SERVICES | Facility: HOME HEALTHCARE | Age: 81
End: 2021-12-16

## 2021-12-16 VITALS
DIASTOLIC BLOOD PRESSURE: 72 MMHG | RESPIRATION RATE: 18 BRPM | OXYGEN SATURATION: 91 % | HEART RATE: 68 BPM | SYSTOLIC BLOOD PRESSURE: 124 MMHG

## 2021-12-16 PROCEDURE — G0151 HHCP-SERV OF PT,EA 15 MIN: HCPCS

## 2021-12-18 NOTE — HOME HEALTH
Pt rec'd for home health PT reassessment. Pt cont to ambualte in the home with 2WRW. She is discouraged by feeling it is slow progress, however over the course of the last 30 days she has gone from being w/c bound to being able to ambualte in her home and enter/exit home with 2WRW.  She will benefit from 2 more weeks of PT and then recommend d/c to outpatient therapy for further progression. Pt and her daughter are aware of this recommendation.

## 2021-12-22 ENCOUNTER — HOME CARE VISIT (OUTPATIENT)
Dept: HOME HEALTH SERVICES | Facility: HOME HEALTHCARE | Age: 81
End: 2021-12-22

## 2021-12-22 VITALS
RESPIRATION RATE: 16 BRPM | OXYGEN SATURATION: 95 % | SYSTOLIC BLOOD PRESSURE: 178 MMHG | HEART RATE: 60 BPM | TEMPERATURE: 97.6 F | DIASTOLIC BLOOD PRESSURE: 80 MMHG

## 2021-12-22 PROCEDURE — G0157 HHC PT ASSISTANT EA 15: HCPCS

## 2021-12-28 ENCOUNTER — HOME CARE VISIT (OUTPATIENT)
Dept: HOME HEALTH SERVICES | Facility: HOME HEALTHCARE | Age: 81
End: 2021-12-28

## 2021-12-28 PROCEDURE — G0151 HHCP-SERV OF PT,EA 15 MIN: HCPCS

## 2021-12-29 VITALS
TEMPERATURE: 97.8 F | DIASTOLIC BLOOD PRESSURE: 72 MMHG | RESPIRATION RATE: 18 BRPM | HEART RATE: 74 BPM | SYSTOLIC BLOOD PRESSURE: 120 MMHG | OXYGEN SATURATION: 95 %

## 2021-12-30 NOTE — CASE COMMUNICATION
Pt is d/c from all home care services as of 12.28.2021. We recommend she transition to outpatient therapy. Pt requests a referral to outpatient PT at [facility].  Thank you

## 2022-01-01 ENCOUNTER — TELEPHONE (OUTPATIENT)
Dept: PHYSICAL THERAPY | Facility: CLINIC | Age: 82
End: 2022-01-01

## 2022-01-01 ENCOUNTER — TELEPHONE (OUTPATIENT)
Dept: INTERNAL MEDICINE | Facility: CLINIC | Age: 82
End: 2022-01-01

## 2022-01-14 ENCOUNTER — APPOINTMENT (OUTPATIENT)
Dept: GENERAL RADIOLOGY | Facility: HOSPITAL | Age: 82
End: 2022-01-14

## 2022-01-14 ENCOUNTER — APPOINTMENT (OUTPATIENT)
Dept: CT IMAGING | Facility: HOSPITAL | Age: 82
End: 2022-01-14

## 2022-01-14 ENCOUNTER — HOSPITAL ENCOUNTER (EMERGENCY)
Facility: HOSPITAL | Age: 82
Discharge: HOME | End: 2022-01-14
Attending: EMERGENCY MEDICINE | Admitting: EMERGENCY MEDICINE

## 2022-01-14 VITALS
SYSTOLIC BLOOD PRESSURE: 156 MMHG | DIASTOLIC BLOOD PRESSURE: 66 MMHG | BODY MASS INDEX: 31.24 KG/M2 | HEART RATE: 53 BPM | RESPIRATION RATE: 16 BRPM | WEIGHT: 183 LBS | TEMPERATURE: 98.6 F | HEIGHT: 64 IN | OXYGEN SATURATION: 98 %

## 2022-01-14 DIAGNOSIS — M25.552 LEFT HIP PAIN: Primary | ICD-10-CM

## 2022-01-14 DIAGNOSIS — Z86.79 HISTORY OF CHF (CONGESTIVE HEART FAILURE): ICD-10-CM

## 2022-01-14 DIAGNOSIS — N18.9 CHRONIC KIDNEY DISEASE, UNSPECIFIED CKD STAGE: ICD-10-CM

## 2022-01-14 DIAGNOSIS — Z86.39 HISTORY OF DIABETES MELLITUS: ICD-10-CM

## 2022-01-14 LAB
ALBUMIN SERPL-MCNC: 3.3 G/DL (ref 3.5–5.2)
ALBUMIN/GLOB SERPL: 1.2 G/DL
ALP SERPL-CCNC: 118 U/L (ref 39–117)
ALT SERPL W P-5'-P-CCNC: 40 U/L (ref 1–33)
ANION GAP SERPL CALCULATED.3IONS-SCNC: 8 MMOL/L (ref 5–15)
AST SERPL-CCNC: 29 U/L (ref 1–32)
BASOPHILS # BLD AUTO: 0.01 10*3/MM3 (ref 0–0.2)
BASOPHILS NFR BLD AUTO: 0.1 % (ref 0–1.5)
BILIRUB SERPL-MCNC: 0.9 MG/DL (ref 0–1.2)
BUN SERPL-MCNC: 68 MG/DL (ref 8–23)
BUN/CREAT SERPL: 54.4 (ref 7–25)
CALCIUM SPEC-SCNC: 8.9 MG/DL (ref 8.6–10.5)
CHLORIDE SERPL-SCNC: 106 MMOL/L (ref 98–107)
CO2 SERPL-SCNC: 27 MMOL/L (ref 22–29)
CREAT SERPL-MCNC: 1.25 MG/DL (ref 0.57–1)
DEPRECATED RDW RBC AUTO: 61.2 FL (ref 37–54)
EOSINOPHIL # BLD AUTO: 0.02 10*3/MM3 (ref 0–0.4)
EOSINOPHIL NFR BLD AUTO: 0.3 % (ref 0.3–6.2)
ERYTHROCYTE [DISTWIDTH] IN BLOOD BY AUTOMATED COUNT: 17.3 % (ref 12.3–15.4)
GFR SERPL CREATININE-BSD FRML MDRD: 41 ML/MIN/1.73
GLOBULIN UR ELPH-MCNC: 2.8 GM/DL
GLUCOSE SERPL-MCNC: 225 MG/DL (ref 65–99)
HCT VFR BLD AUTO: 33.1 % (ref 34–46.6)
HGB BLD-MCNC: 10.3 G/DL (ref 12–15.9)
HOLD SPECIMEN: NORMAL
IMM GRANULOCYTES # BLD AUTO: 0.06 10*3/MM3 (ref 0–0.05)
IMM GRANULOCYTES NFR BLD AUTO: 0.8 % (ref 0–0.5)
LYMPHOCYTES # BLD AUTO: 1.05 10*3/MM3 (ref 0.7–3.1)
LYMPHOCYTES NFR BLD AUTO: 14.3 % (ref 19.6–45.3)
MCH RBC QN AUTO: 30.2 PG (ref 26.6–33)
MCHC RBC AUTO-ENTMCNC: 31.1 G/DL (ref 31.5–35.7)
MCV RBC AUTO: 97.1 FL (ref 79–97)
MONOCYTES # BLD AUTO: 0.59 10*3/MM3 (ref 0.1–0.9)
MONOCYTES NFR BLD AUTO: 8 % (ref 5–12)
NEUTROPHILS NFR BLD AUTO: 5.6 10*3/MM3 (ref 1.7–7)
NEUTROPHILS NFR BLD AUTO: 76.5 % (ref 42.7–76)
NRBC BLD AUTO-RTO: 0 /100 WBC (ref 0–0.2)
NT-PROBNP SERPL-MCNC: 2893 PG/ML (ref 0–1800)
PLATELET # BLD AUTO: 165 10*3/MM3 (ref 140–450)
PMV BLD AUTO: 11.7 FL (ref 6–12)
POTASSIUM SERPL-SCNC: 5.5 MMOL/L (ref 3.5–5.2)
PROT SERPL-MCNC: 6.1 G/DL (ref 6–8.5)
RBC # BLD AUTO: 3.41 10*6/MM3 (ref 3.77–5.28)
SODIUM SERPL-SCNC: 141 MMOL/L (ref 136–145)
WBC NRBC COR # BLD: 7.33 10*3/MM3 (ref 3.4–10.8)
WHOLE BLOOD HOLD SPECIMEN: NORMAL
WHOLE BLOOD HOLD SPECIMEN: NORMAL

## 2022-01-14 PROCEDURE — 63710000001 ONDANSETRON PER 8 MG: Performed by: EMERGENCY MEDICINE

## 2022-01-14 PROCEDURE — 72192 CT PELVIS W/O DYE: CPT

## 2022-01-14 PROCEDURE — 85025 COMPLETE CBC W/AUTO DIFF WBC: CPT | Performed by: PHYSICIAN ASSISTANT

## 2022-01-14 PROCEDURE — 73502 X-RAY EXAM HIP UNI 2-3 VIEWS: CPT

## 2022-01-14 PROCEDURE — 80053 COMPREHEN METABOLIC PANEL: CPT | Performed by: PHYSICIAN ASSISTANT

## 2022-01-14 PROCEDURE — 99284 EMERGENCY DEPT VISIT MOD MDM: CPT

## 2022-01-14 PROCEDURE — 83880 ASSAY OF NATRIURETIC PEPTIDE: CPT | Performed by: PHYSICIAN ASSISTANT

## 2022-01-14 RX ORDER — ONDANSETRON 4 MG/1
4 TABLET, FILM COATED ORAL ONCE
Status: COMPLETED | OUTPATIENT
Start: 2022-01-14 | End: 2022-01-14

## 2022-01-14 RX ORDER — HYDROCODONE BITARTRATE AND ACETAMINOPHEN 5; 325 MG/1; MG/1
1 TABLET ORAL EVERY 6 HOURS PRN
Qty: 15 TABLET | Refills: 0 | Status: SHIPPED | OUTPATIENT
Start: 2022-01-14 | End: 2022-05-13 | Stop reason: ALTCHOICE

## 2022-01-14 RX ORDER — ONDANSETRON 4 MG/1
4 TABLET, ORALLY DISINTEGRATING ORAL EVERY 6 HOURS PRN
Qty: 15 TABLET | Refills: 0 | Status: SHIPPED | OUTPATIENT
Start: 2022-01-14 | End: 2022-05-13 | Stop reason: ALTCHOICE

## 2022-01-14 RX ORDER — HYDROCODONE BITARTRATE AND ACETAMINOPHEN 7.5; 325 MG/1; MG/1
1 TABLET ORAL ONCE
Status: COMPLETED | OUTPATIENT
Start: 2022-01-14 | End: 2022-01-14

## 2022-01-14 RX ORDER — SODIUM CHLORIDE 0.9 % (FLUSH) 0.9 %
10 SYRINGE (ML) INJECTION AS NEEDED
Status: DISCONTINUED | OUTPATIENT
Start: 2022-01-14 | End: 2022-01-14 | Stop reason: HOSPADM

## 2022-01-14 RX ORDER — LIDOCAINE 50 MG/G
1 PATCH TOPICAL EVERY 24 HOURS
Qty: 6 PATCH | Refills: 0 | Status: SHIPPED | OUTPATIENT
Start: 2022-01-14 | End: 2022-01-20

## 2022-01-14 RX ORDER — CYCLOBENZAPRINE HCL 10 MG
10 TABLET ORAL 3 TIMES DAILY PRN
Qty: 15 TABLET | Refills: 0 | Status: SHIPPED | OUTPATIENT
Start: 2022-01-14 | End: 2022-05-13 | Stop reason: ALTCHOICE

## 2022-01-14 RX ORDER — LIDOCAINE 50 MG/G
2 PATCH TOPICAL
Status: DISCONTINUED | OUTPATIENT
Start: 2022-01-14 | End: 2022-01-14 | Stop reason: HOSPADM

## 2022-01-14 RX ADMIN — HYDROCODONE BITARTRATE AND ACETAMINOPHEN 1 TABLET: 7.5; 325 TABLET ORAL at 13:49

## 2022-01-14 RX ADMIN — LIDOCAINE 2 PATCH: 50 PATCH CUTANEOUS at 13:47

## 2022-01-14 RX ADMIN — ONDANSETRON HYDROCHLORIDE 4 MG: 4 TABLET, FILM COATED ORAL at 13:49

## 2022-01-14 NOTE — ED PROVIDER NOTES
Subjective   Pt is a 80 yo female presenting to ED with complaints of left hip pain. PMHx significant for CAD, DM (insulin), PVD, Osteomyelitis s/p right great toe amputation 4/2021, HTN, HLD, CHF, ELIUD with 2 L at night, CKD and Asthma. Pt complains of left hip pain for about 2 weeks gradually worsening to the point she could barely move this morning. Pt denies known injury. Pt with hx of being immobile / non ambulatory from 4/2021 to 11/2021 due to problems with PVD and foot infection. Pt has been at home going through therapy and has been working from wheel chair to using a walker. She noticed pain in left hip with movement in therapy 2 weeks ago and at one point did feel a popping sensation. Pt denies fever, chills, CP, SOB, cough, N/V/ D, abdominal pain or urinary sx. She does have some swelling to lissett LE but reports much better than prior. She denies redness or swelling to left hip. Pain worse with any movement. EMS gave patient Fentanyl in route and noted on arrival to ED 86% on RA and patient placed on O2. Pt does not wear O2 at home and daughter reports being sensitive to pain meds. Pt denies tobacco, drug or ETOH use. She has had her Covid vaccines x3.       History provided by:  Patient and medical records      Review of Systems   Constitutional: Negative for chills and fever.   HENT: Negative for congestion.    Eyes: Negative for visual disturbance.   Respiratory: Negative for cough and shortness of breath.    Cardiovascular: Positive for leg swelling (chronic, improved). Negative for chest pain.   Gastrointestinal: Negative for abdominal pain, diarrhea, nausea and vomiting.   Genitourinary: Negative for difficulty urinating.   Musculoskeletal: Positive for arthralgias (Left hip). Negative for back pain and neck pain.   Skin: Negative for color change and wound.   Neurological: Negative for dizziness, weakness and numbness.   Psychiatric/Behavioral: Negative for confusion.       Past Medical History:    Diagnosis Date   • Asthma 6/4 - double pneumonia    Currently on inhaler and nebulizer   • Cancer ([facility])     cervical cancer, skin cancer   • CHF (congestive heart failure) ([facility]) June 4, 2021   • Chronic kidney disease Related to diabetes   • Coronary artery disease 6/4/2021 DX for hear failure   • Diabetes mellitus ([facility]) 30 years    Seeing Dr. Lam 1st time Aug 19   • Gout    • Hyperlipidemia Reference current labs x 2-3yrs approx   • Hypertension 30 years   • Mitral valve disease    • Mitral valve disease    • Osteomyelitis ([facility])    • Peripheral neuropathy    • Sleep apnea    • Type 2 diabetes mellitus ([facility])     30 years       Allergies   Allergen Reactions   • Cephalexin Nausea Only   • Erythromycin Base Nausea Only   • Oxycodone Nausea Only   • Sulfa Antibiotics Nausea Only       Past Surgical History:   Procedure Laterality Date   • ABDOMINAL HYSTERECTOMY W/SALPINGECTOMY     • AMPUTATION  Right great toe, 1st 1/3 metatarsal -Drift 4/14/21   • AORTAGRAM N/A 4/9/2021    Procedure: AORTAGRAM WITH OR WITHOUT RUNOFFS, WITH Co2;  Surgeon: Trey Forrest MD;  [facility]:  [facility];  Service: Vascular;  Laterality: N/A;   • APPENDECTOMY     • BRAIN TUMOR EXCISION      laser surgery    • CARDIAC CATHETERIZATION N/A 6/21/2021    Procedure: LEFT HEART CATH;  Surgeon: Anjum Ceballos MD;  Location: [facility] CATH INVASIVE LOCATION;  Service: Cardiology;  Laterality: N/A;   • CATARACT EXTRACTION W/ INTRAOCULAR LENS  IMPLANT, BILATERAL     • CHOLECYSTECTOMY     • HYSTERECTOMY     • TRANS METATARSAL AMPUTATION Right 4/14/2021    Procedure: AMPUTATION TRANS METATARSAL RIGHT GREAT TOE;  Surgeon: Valdez Finney MD;  Location:  [facility];  Service: Vascular;  Laterality: Right;       Family History   Problem Relation Age of Onset   • Diabetes Mother         Type II   • Heart attack Father    • Coronary artery disease Father    • Diabetes Father         Type II       Social History     Socioeconomic History   • Marital status:     • Number of children: 1   Tobacco Use   • Smoking status: Never Smoker   • Smokeless tobacco: Never Used   Vaping Use   • Vaping Use: Never used   Substance and Sexual Activity   • Alcohol use: Not Currently     Alcohol/week: 0.0 standard drinks     Comment: Social drinking when not recovering from hospitalization   • Drug use: Never   • Sexual activity: Not Currently     Birth control/protection: None           Objective   Physical Exam  Vitals and nursing note reviewed.   Constitutional:       General: She is not in acute distress.     Appearance: She is obese.   HENT:      Head: Atraumatic.   Eyes:      Extraocular Movements: Extraocular movements intact.      Conjunctiva/sclera: Conjunctivae normal.   Cardiovascular:      Rate and Rhythm: Normal rate.      Pulses: Normal pulses.      Heart sounds: Normal heart sounds.   Pulmonary:      Effort: Pulmonary effort is normal. No respiratory distress.      Breath sounds: Normal breath sounds.   Abdominal:      Palpations: Abdomen is soft.      Tenderness: There is no abdominal tenderness.   Musculoskeletal:      Cervical back: Normal range of motion and neck supple.      Left hip: Tenderness present. No deformity. Decreased range of motion. Normal strength.      Left knee: No tenderness.      Right lower leg: Edema (+1) present.      Left lower leg: Edema (+1) present.      Left ankle: No tenderness. Normal range of motion.      Comments: Right big toe amputation     Skin:     General: Skin is warm.      Capillary Refill: Capillary refill takes less than 2 seconds.   Neurological:      General: No focal deficit present.      Mental Status: She is alert and oriented to person, place, and time.   Psychiatric:         Mood and Affect: Mood normal.         Behavior: Behavior normal.         Procedures           ED Course  ED Course as of 01/14/22 1351   Fri Jan 14, 2022   1258 Potassium(!): 5.5 [RT]      ED Course User Index  [RT] Vi Judd PA         Re-examined patient several times in ED. Pt resting and pain improved but not resolved. Discussed results and tx plan. Daughter in ED and agreeable with plan. Will dc home and she will f/u with Ortho and PCP. No emergent findings on hip imaging. Made patient an appointment with Ortho on Monday at 11 am, 1-17-22. Will dc home on short course of Norco and Flexeril for pain control. Discussed risks / benefits and caution with narcotics. Applied Lidoderm patches in ED and will dc home with Rx.     Reviewed old records.     Recent Results (from the past 24 hour(s))   Comprehensive Metabolic Panel    Collection Time: 01/14/22 10:44 AM    Specimen: Blood   Result Value Ref Range    Glucose 225 (H) 65 - 99 mg/dL    BUN 68 (H) 8 - 23 mg/dL    Creatinine 1.25 (H) 0.57 - 1.00 mg/dL    Sodium 141 136 - 145 mmol/L    Potassium 5.5 (H) 3.5 - 5.2 mmol/L    Chloride 106 98 - 107 mmol/L    CO2 27.0 22.0 - 29.0 mmol/L    Calcium 8.9 8.6 - 10.5 mg/dL    Total Protein 6.1 6.0 - 8.5 g/dL    Albumin 3.30 (L) 3.50 - 5.20 g/dL    ALT (SGPT) 40 (H) 1 - 33 U/L    AST (SGOT) 29 1 - 32 U/L    Alkaline Phosphatase 118 (H) 39 - 117 U/L    Total Bilirubin 0.9 0.0 - 1.2 mg/dL    eGFR Non African Amer 41 (L) >60 mL/min/1.73    Globulin 2.8 gm/dL    A/G Ratio 1.2 g/dL    BUN/Creatinine Ratio 54.4 (H) 7.0 - 25.0    Anion Gap 8.0 5.0 - 15.0 mmol/L   BNP    Collection Time: 01/14/22 10:44 AM    Specimen: Blood   Result Value Ref Range    proBNP 2,893.0 (H) 0.0-1,800.0 pg/mL   CBC Auto Differential    Collection Time: 01/14/22 10:44 AM    Specimen: Blood   Result Value Ref Range    WBC 7.33 3.40 - 10.80 10*3/mm3    RBC 3.41 (L) 3.77 - 5.28 10*6/mm3    Hemoglobin 10.3 (L) 12.0 - 15.9 g/dL    Hematocrit 33.1 (L) 34.0 - 46.6 %    MCV 97.1 (H) 79.0 - 97.0 fL    MCH 30.2 26.6 - 33.0 pg    MCHC 31.1 (L) 31.5 - 35.7 g/dL    RDW 17.3 (H) 12.3 - 15.4 %    RDW-SD 61.2 (H) 37.0 - 54.0 fl    MPV 11.7 6.0 - 12.0 fL    Platelets 165 140 - 450 10*3/mm3     Neutrophil % 76.5 (H) 42.7 - 76.0 %    Lymphocyte % 14.3 (L) 19.6 - 45.3 %    Monocyte % 8.0 5.0 - 12.0 %    Eosinophil % 0.3 0.3 - 6.2 %    Basophil % 0.1 0.0 - 1.5 %    Immature Grans % 0.8 (H) 0.0 - 0.5 %    Neutrophils, Absolute 5.60 1.70 - 7.00 10*3/mm3    Lymphocytes, Absolute 1.05 0.70 - 3.10 10*3/mm3    Monocytes, Absolute 0.59 0.10 - 0.90 10*3/mm3    Eosinophils, Absolute 0.02 0.00 - 0.40 10*3/mm3    Basophils, Absolute 0.01 0.00 - 0.20 10*3/mm3    Immature Grans, Absolute 0.06 (H) 0.00 - 0.05 10*3/mm3    nRBC 0.0 0.0 - 0.2 /100 WBC   Green Top (Gel)    Collection Time: 01/14/22 10:44 AM   Result Value Ref Range    Extra Tube Hold for add-ons.    Lavender Top    Collection Time: 01/14/22 10:44 AM   Result Value Ref Range    Extra Tube hold for add-on    Gold Top - SST    Collection Time: 01/14/22 10:44 AM   Result Value Ref Range    Extra Tube Hold for add-ons.    Light Blue Top    Collection Time: 01/14/22 10:44 AM   Result Value Ref Range    Extra Tube hold for add-on      Note: In addition to lab results from this visit, the labs listed above may include labs taken at another facility or during a different encounter within the last 24 hours. Please correlate lab times with ED admission and discharge times for further clarification of the services performed during this visit.    CT Pelvis Without Contrast   Final Result   No acute pelvic fracture. No acute findings otherwise.           This report was finalized on 1/14/2022 12:35 PM by Babatunde Dinero.          XR Hip With or Without Pelvis 2 - 3 View Left   Final Result   Moderate to severe bilateral hip arthrosis is present with   narrowing, sclerosis and small osteophytes, fairly symmetric. Cortical   margins are otherwise intact without evidence of acute fracture.   Moderate symmetric SI joint degenerative changes are also present. The   sacrum is somewhat poorly evaluated due to overlying bowel gas, if there   is concern for occult sacral fracture,  consider CT.       This report was finalized on 1/14/2022 11:41 AM by Babatunde Dinero.            Vitals:    01/14/22 1130 01/14/22 1130 01/14/22 1247 01/14/22 1349   BP: 156/66      BP Location:       Patient Position:       Pulse: 54 54 51 53   Resp:  16 16 16   Temp:       TempSrc:       SpO2: 99% 99% 100% 98%   Weight:       Height:         Medications   sodium chloride 0.9 % flush 10 mL (has no administration in time range)   lidocaine (LIDODERM) 5 % 2 patch (2 patches Transdermal Medication Applied 1/14/22 1347)   HYDROcodone-acetaminophen (NORCO) 7.5-325 MG per tablet 1 tablet (1 tablet Oral Given 1/14/22 1349)   ondansetron (ZOFRAN) tablet 4 mg (4 mg Oral Given 1/14/22 1349)     ECG/EMG Results (last 24 hours)     ** No results found for the last 24 hours. **        No orders to display       DISCHARGE    Patient discharged in stable condition.    Reviewed implications of results, diagnosis, meds, responsibility to follow up, warning signs and symptoms of possible worsening, potential complications and reasons to return to ER.    Patient/Family voiced understanding of above instructions.    Discussed plan for discharge, as there is no emergent indication for admission.  Pt/family is agreeable and understands need for follow up and possible repeat testing.  Pt/family is aware that discharge does not mean that nothing is wrong but that it indicates no emergency is currently present that requires admission and they must continue care with follow-up as given below or with a physician of their choice.     FOLLOW-UP  Adal Brito MD  [address] [facility]  [address]  [place] [address]  [phone]    On 1/17/2022  11 AM    Shaq Ordoñez MD  [address] [place] [address]  [phone]    Schedule an appointment as soon as possible for a visit       [facility]  [address] [place] [id number] [phone]    If symptoms worsen          Medication List      New Prescriptions    cyclobenzaprine 10 MG tablet  Commonly known as: FLEXERIL  Take 1 tablet by mouth 3 (Three) Times a Day As Needed for Muscle Spasms for up to 15 doses.     HYDROcodone-acetaminophen 5-325 MG per tablet  Commonly known as: NORCO  Take 1 tablet by mouth Every 6 (Six) Hours As Needed for Moderate Pain  for up to 15 doses.     lidocaine 5 %  Commonly known as: LIDODERM  Place 1 patch on the skin as directed by provider Daily for 6 doses. Remove & Discard patch within 12 hours or as directed by MD     ondansetron ODT 4 MG disintegrating tablet  Commonly known as: ZOFRAN-ODT  Place 1 tablet on the tongue Every 6 (Six) Hours As Needed for Nausea or Vomiting for up to 15 doses.           Where to Get Your Medications      These medications were sent to [facility] [employer] - [place], [place] - [facility] [address]. - [phone]  - [phone] [facility] [address], [facility] [address]    Phone: [phone]   · cyclobenzaprine 10 MG tablet  · HYDROcodone-acetaminophen 5-325 MG per tablet  · lidocaine 5 %  · ondansetron ODT 4 MG disintegrating tablet                                    AGUSTIN reviewed by Vivi Torrez MD             [facility]    Final diagnoses:   Left hip pain   History of CHF (congestive heart failure)   Chronic kidney disease, unspecified CKD stage   History of diabetes mellitus       ED Disposition  ED Disposition     ED Disposition Condition Comment    Discharge Stable           Adal Brito MD  [address] [facility]  [address]  [place] [address]  [phone]    On 1/17/2022  11 AM    Shaq Ordoñez MD  [address]  [place] [address]  [phone]    Schedule an appointment as soon as possible for a visit       [facility] Emergency Department  [address] [facility] [address] [phone]    If symptoms worsen         Medication List      New Prescriptions    cyclobenzaprine 10 MG tablet  Commonly known as:  FLEXERIL  Take 1 tablet by mouth 3 (Three) Times a Day As Needed for Muscle Spasms for up to 15 doses.     HYDROcodone-acetaminophen 5-325 MG per tablet  Commonly known as: NORCO  Take 1 tablet by mouth Every 6 (Six) Hours As Needed for Moderate Pain  for up to 15 doses.     lidocaine 5 %  Commonly known as: LIDODERM  Place 1 patch on the skin as directed by provider Daily for 6 doses. Remove & Discard patch within 12 hours or as directed by MD     ondansetron ODT 4 MG disintegrating tablet  Commonly known as: ZOFRAN-ODT  Place 1 tablet on the tongue Every 6 (Six) Hours As Needed for Nausea or Vomiting for up to 15 doses.           Where to Get Your Medications      These medications were sent to [facility] - [place], [place] - [facility] [address] - [phone]  - [phone] [facility] [address], [facility] [address]    Phone: [phone]   · cyclobenzaprine 10 MG tablet  · HYDROcodone-acetaminophen 5-325 MG per tablet  · lidocaine 5 %  · ondansetron ODT 4 MG disintegrating tablet          Vi Judd PA  01/14/22 9491

## 2022-01-17 ENCOUNTER — OFFICE VISIT (OUTPATIENT)
Dept: ORTHOPEDIC SURGERY | Facility: CLINIC | Age: 82
End: 2022-01-17

## 2022-01-17 VITALS
WEIGHT: 182.98 LBS | HEIGHT: 64 IN | SYSTOLIC BLOOD PRESSURE: 148 MMHG | BODY MASS INDEX: 31.24 KG/M2 | DIASTOLIC BLOOD PRESSURE: 82 MMHG

## 2022-01-17 DIAGNOSIS — M47.816 SPONDYLOSIS OF LUMBAR SPINE: Primary | ICD-10-CM

## 2022-01-17 DIAGNOSIS — M16.0 PRIMARY OSTEOARTHRITIS OF BOTH HIPS: ICD-10-CM

## 2022-01-17 DIAGNOSIS — M54.50 ACUTE LEFT-SIDED LOW BACK PAIN WITHOUT SCIATICA: ICD-10-CM

## 2022-01-17 PROCEDURE — 99203 OFFICE O/P NEW LOW 30 MIN: CPT | Performed by: PHYSICIAN ASSISTANT

## 2022-01-17 NOTE — PROGRESS NOTES
[facility] Note    Subjective     Chief Complaint   Patient presents with   • Left Hip - Pain        HPI  Susan Anderson is a 81 y.o. female.  New patient presents for evaluation of left hip pain.  Symptoms/pain have been ongoing for 2 weeks and gradually worsening.  NAFISA: No specific history of injury or trauma that the patient can recall.  She was seen in the ED on 1/14/2022 and referred to orthopedics to have her hip assessed.  Patient was prescribed Flexeril as well as hydrocodone by the ED.    Pain scale: 0-10/10, patient states without medication she has 10 out of 10 but with the medication she has no pain.  Severity of the pain currently none but when she was seen in the ED severe.  Quality of the pain shooting.  Associated symptoms pain only.  Activity related to pain walking, standing, sitting, rising from a seated position.  Pain relieved by resting, medication.  Patient is currently sitting in wheelchair for today's evaluation.      Denies fever, chills, night sweats or other constitutional symptoms.      Past Medical History:   Diagnosis Date   • Asthma 6/4 - double pneumonia    Currently on inhaler and nebulizer   • Cancer (HCC)     cervical cancer, skin cancer   • CHF (congestive heart failure) ([facility]) June 4, 2021   • Chronic kidney disease Related to diabetes   • Coronary artery disease 6/4/2021 DX for hear failure   • Diabetes mellitus (HCC) 30 years    Seeing Dr. Lam 1st time Aug 19   • Gout    • Hyperlipidemia Reference current labs x 2-3yrs approx   • Hypertension 30 years   • Mitral valve disease    • Mitral valve disease    • Osteomyelitis (HCC)    • Peripheral neuropathy    • Sleep apnea    • Type 2 diabetes mellitus (HCC)     30 years      Past Surgical History:   Procedure Laterality Date   • ABDOMINAL HYSTERECTOMY W/SALPINGECTOMY     • AMPUTATION  Right great toe, 1st 1/3 metatarsal -[facility] 4/14/21   • AORTAGRAM N/A 4/9/2021    Procedure: AORTAGRAM WITH OR WITHOUT  RUNOFFS, WITH Co2;  Surgeon: Trey Forrest MD;  Location:  [facility];  Service: Vascular;  Laterality: N/A;   • APPENDECTOMY     • BRAIN TUMOR EXCISION      laser surgery    • CARDIAC CATHETERIZATION N/A 6/21/2021    Procedure: LEFT HEART CATH;  Surgeon: Anjum Ceballos MD;  Location:  [facility];  Service: Cardiology;  Laterality: N/A;   • CATARACT EXTRACTION W/ INTRAOCULAR LENS  IMPLANT, BILATERAL     • CHOLECYSTECTOMY     • HYSTERECTOMY     • TRANS METATARSAL AMPUTATION Right 4/14/2021    Procedure: AMPUTATION TRANS METATARSAL RIGHT GREAT TOE;  Surgeon: Valdez Finney MD;  Location:  [facility];  Service: Vascular;  Laterality: Right;      Family History   Problem Relation Age of Onset   • Diabetes Mother         Type II   • Heart attack Father    • Coronary artery disease Father    • Diabetes Father         Type II     Social History     Socioeconomic History   • Marital status:    • Number of children: 1   Tobacco Use   • Smoking status: Never Smoker   • Smokeless tobacco: Never Used   Vaping Use   • Vaping Use: Never used   Substance and Sexual Activity   • Alcohol use: Not Currently     Alcohol/week: 0.0 standard drinks     Comment: Social drinking when not recovering from hospitalization   • Drug use: Never   • Sexual activity: Not Currently     Birth control/protection: None      Current Outpatient Medications on File Prior to Visit   Medication Sig Dispense Refill   • Accu-Chek FastClix Lancets misc      • Accu-Chek SmartView test strip      • allopurinol (ZYLOPRIM) 300 MG tablet      • ammonium lactate (LAC-HYDRIN) 12 % lotion      • aspirin 81 MG EC tablet Take 1 tablet by mouth Daily.     • atorvastatin (Lipitor) 80 MG tablet Take 1 tablet by mouth Daily. 90 tablet 0   • azithromycin (Zithromax Z-Balaji) 250 MG tablet Take 2 tablets the first day, then 1 tablet daily for 4 days. 6 tablet 0   • carvedilol (COREG) 12.5 MG tablet Take 1 tablet by mouth 2 (Two) Times a Day. 60  tablet 11   • cyclobenzaprine (FLEXERIL) 10 MG tablet Take 1 tablet by mouth 3 (Three) Times a Day As Needed for Muscle Spasms for up to 15 doses. 15 tablet 0   • fluticasone (FLONASE) 50 MCG/ACT nasal spray 2 sprays by Each Nare route Daily.     • furosemide (LASIX) 40 MG tablet Take 0.5 tablets by mouth Daily. 90 tablet    • glucose blood test strip Accu-Chek SmartView Test Strips   USE TO TEST BLOOD SUGAR THREE TIMES A DAY     • glucose blood test strip Accu-Chek SmartView Test Strips     • HYDROcodone-acetaminophen (NORCO) 5-325 MG per tablet Take 1 tablet by mouth Every 6 (Six) Hours As Needed for Moderate Pain  for up to 15 doses. 15 tablet 0   • insulin glargine (Lantus) 100 UNIT/ML injection Inject 30 Units under the skin into the appropriate area as directed Daily. 10 mL 5   • insulin lispro (HumaLOG) 100 UNIT/ML injection For use at mealtimes and per correctional scale, MDD 50 units 20 mL 5   • isosorbide dinitrate (ISORDIL) 20 MG tablet      • isosorbide-hydrALAZINE (BIDIL) 20-37.5 MG per tablet Take 1 tablet by mouth 3 (Three) Times a Day. 270 tablet 3   • lidocaine (LIDODERM) 5 % Place 1 patch on the skin as directed by provider Daily for 6 doses. Remove & Discard patch within 12 hours or as directed by MD 6 patch 0   • Nutritional Supplements (Dez) powder Take  by mouth 2 (Two) Times a Day.     • O2 (OXYGEN) Inhale 2 L/min Every Night.     • ondansetron ODT (ZOFRAN-ODT) 4 MG disintegrating tablet Place 1 tablet on the tongue Every 6 (Six) Hours As Needed for Nausea or Vomiting for up to 15 doses. 15 tablet 0   • pregabalin (Lyrica) 100 MG capsule 100mg in the morning and 200mg at night 9 capsule 0   • traMADol (ULTRAM) 50 MG tablet Take 1 tablet by mouth 3 (Three) Times a Day As Needed.     • vitamin B-12 (cyanocobalamin) 100 MCG tablet Take 1 tablet by mouth Daily.       No current facility-administered medications on file prior to visit.      Allergies   Allergen Reactions   • Cephalexin Nausea  "Only   • Erythromycin Base Nausea Only   • Oxycodone Nausea Only   • Sulfa Antibiotics Nausea Only        The following portions of the patient's history were reviewed and updated as appropriate: allergies, current medications, past family history, past medical history, past social history, past surgical history and problem list.    Review of Systems   Constitutional: Negative.    HENT: Negative.    Eyes: Negative.    Respiratory: Negative.    Cardiovascular: Negative.    Gastrointestinal: Negative.    Endocrine: Negative.    Genitourinary: Negative.    Musculoskeletal: Positive for arthralgias.   Skin: Negative.    Allergic/Immunologic: Negative.    Neurological: Negative.    Hematological: Negative.    Psychiatric/Behavioral: Negative.         Objective      Physical Exam  /82   Ht 162.6 cm (64.02\")   Wt 83 kg (182 lb 15.7 oz)   BMI 31.39 kg/m²     Body mass index is 31.39 kg/m².    GENERAL APPEARANCE: awake, alert & oriented x 3, in no acute distress and well developed, well nourished  PSYCH: normal mood and affect  LUNGS:  breathing nonlabored, no wheezing  EYES: sclera anicteric, pupils equal  CARDIOVASCULAR: palpable pulses. Capillary refill less than 2 seconds  INTEGUMENTARY: skin intact, no clubbing, cyanosis  NEUROLOGIC:  Normal Sensation         Ortho Exam  Left hip/low back  Neurologic:   Sensation:    Intact to light touch on the dorsal and plantar aspect   Motor:    5/5 quadriceps, hamstrings, ankle dorsiflexors, and ankle plantar flexors    Vascular:   2+ dorsalis pedis pulse, prompt capillary refill    Lower Extremity:   Left hip:    Tenderness:  No groin pain.  All pain is in the low back and buttock region with radiation into thigh.    Swelling:  None    Crepitus:  None    Atrophy:  None    Range of motion:  External Rotation: 30°       Internal Rotation: 30°       Flexion:  100°       Extension:  0°     Instability:  None    Deformities:  None     Functional testing: Positive Oswaldofield " with pain to the left low back, buttock and upper thigh.  No groin pain.     ER/IR PROM hip: Negative     No leg length discrepancy      Imaging/Studies  Dr. Brito and I reviewed plain film imaging from 1/14/2022 as well as CT of the pelvis.    EXAMINATION: XR HIP W OR WO PELVIS 2-3 VIEW LEFT-      INDICATION: PAIN      COMPARISON: NONE     FINDINGS: Moderate to severe bilateral hip arthrosis is present with  narrowing, sclerosis and small osteophytes, fairly symmetric. Cortical  margins are otherwise intact without evidence of acute fracture.  Moderate symmetric SI joint degenerative changes are also present. The  sacrum is somewhat poorly evaluated due to overlying bowel gas, if there  is concern for occult sacral fracture, consider CT.     IMPRESSION:  Moderate to severe bilateral hip arthrosis is present with  narrowing, sclerosis and small osteophytes, fairly symmetric. Cortical  margins are otherwise intact without evidence of acute fracture.  Moderate symmetric SI joint degenerative changes are also present. The  sacrum is somewhat poorly evaluated due to overlying bowel gas, if there  is concern for occult sacral fracture, consider CT.     This report was finalized on 1/14/2022 11:41 AM by Babatunde Dinero.    EXAMINATION: CT PELVIS WO CONTRAST-      INDICATION: concern for left hip fracture, neg xray     TECHNIQUE: Axial noncontrast CT of the pelvis with multiplanar  reconstruction     The radiation dose reduction device was turned on for each scan per the  ALARA (As Low as Reasonably Achievable) protocol.     COMPARISON: NONE     FINDINGS: The pelvic viscera are unremarkable. Images segments of bowel  are nonobstructed with mild fecal loading present. The superficial body  wall soft tissues demonstrate no focal finding. Partially imaged  aortoiliac atherosclerotic change is present. Evaluation of the osseous  structures demonstrates no evidence of pelvic fracture. Mild symmetric  hip and bilateral SI  joint degenerative changes are present. The  partially imaged lower lumbar spine demonstrates significant  spondylosis.     IMPRESSION:  No acute pelvic fracture. No acute findings otherwise.        This report was finalized on 1/14/2022 12:35 PM by Babatunde Dinero.    Assessment/Plan        ICD-10-CM ICD-9-CM   1. Spondylosis of lumbar spine  M47.816 721.3   2. Acute left-sided low back pain without sciatica  M54.50 724.2   3. Primary osteoarthritis of both hips  M16.0 715.15       Orders Placed This Encounter   Procedures   • MRI Lumbar Spine Without Contrast   • Ambulatory Referral to Pain Management        -Left low back/buttock pain with radiation into lateral/anterior thigh.  No reported groin pain.  -Referred Dr. Dobson for further evaluation and treatment.  Patient states she has had a history of sciatica and injections in her back in the past which helped.  -Patient also with noted osteoarthritis of the hips however she is having no groin pain at this time so believe that her pain is more related to the low back and buttock pain/sciatica that she is experiencing.  -Ordered an MRI of the lumbar spine to better assess.  -Recommend plain film imaging of the lumbar spine, which her PCP can order.  -Patient still taking the Flexeril and Norco 5 from her ED visit.  Recommend transitioning to over-the-counter pain medication when/as able.  -Follow up our clinic as needed.  It was explained to the patient and her daughter that if her groin becomes symptomatic this is related more to her arthritis and we can discuss a possible intra-articular corticosteroid injection.  -Questions and concerns answered.      Medical Decision Making  Management Options : prescription/IM medicine  Data/Risk: radiology tests       Shell Sterling PA-C  01/18/22  16:13 EST               EMR Dragon/Transcription disclaimer:  Much of this encounter note is an electronic transcription of spoken language to printed text. Electronic  transcription of spoken language may permit erroneous, or at times, nonsensical words or phrases to be inadvertently transcribed. Although I have reviewed the note for such errors, some may still exist.

## 2022-01-18 ENCOUNTER — TELEPHONE (OUTPATIENT)
Dept: ORTHOPEDIC SURGERY | Facility: CLINIC | Age: 82
End: 2022-01-18

## 2022-01-18 DIAGNOSIS — M54.50 ACUTE LEFT-SIDED LOW BACK PAIN WITHOUT SCIATICA: Primary | ICD-10-CM

## 2022-01-18 DIAGNOSIS — M47.816 SPONDYLOSIS OF LUMBAR SPINE: ICD-10-CM

## 2022-01-18 NOTE — TELEPHONE ENCOUNTER
Caller: Susan Anderson    Relationship: Self    Best call back number: [phone]  What is the best time to reach you: ANYTIME     Who are you requesting to speak with (clinical staff, provider,  specific staff member):  CLINICAL     Do you know the name of the person who called:     What was the call regarding:  PATIENT ADVISED MAY MULLIGAN RECOMMENDED TWO NERVE DOCTORS. PATIENT ADVISED HER PRIMARY CARE DOCTOR NEEDS MORE INFORMATION REGARDING HER REFERRAL TO THE NERVE DOCTOR. PATIENT ADVISED SHE WOULD LIKE A CALL BACK. PATIENT STATED SHE IS CONFUSED AND SHE IS NOT SURE WHAT TO DO.     Do you require a callback: YES

## 2022-01-18 NOTE — TELEPHONE ENCOUNTER
----- Message from Susan Anderson sent at 1/18/2022  3:00 PM EST -----  Regarding: Open MRI Order and xrays   Richy Goff,    Thank you for seeing me yesterday.  I was contacted by [facility] to set up an MRI and they did not have it down for an Open. They do not do open MRIs.  Your office said they would call me back with one to get scheduled.      I called my primary care and he said he needed an order for the L45?? back xray.  They said they needed to know which part of the back.      Can you or your office advise Dr. Shaq Ordoñez's office of that as well [phone]. PLEASE.  I also called your office and left a message of the same. Susan Anderson [phone]

## 2022-01-18 NOTE — TELEPHONE ENCOUNTER
Shell, All I see is a referral for Dr Dobson, were there any other doctors you recommended? See note below.

## 2022-01-18 NOTE — TELEPHONE ENCOUNTER
Spoke with patient, she was unclear on treatment plan. I explained that someone would be calling her for appts for her MRI and Dr Dobson referral. I also explained that I would call her PCP to give guidance/clarity on tx plan. Left Message at [facility] to discuss care.

## 2022-01-19 NOTE — TELEPHONE ENCOUNTER
Spoke with Dr Perry, he would prefer that we do lumbar xrays or allow Dr Dobson to order Xrays as Ms. Anderson hasn't been seen at his office in over a year. Shell please advise.

## 2022-02-02 RX ORDER — ISOSORBIDE DINITRATE 20 MG/1
20 TABLET ORAL 2 TIMES DAILY
Qty: 180 TABLET | Refills: 3 | Status: SHIPPED | OUTPATIENT
Start: 2022-02-02 | End: 2022-09-30 | Stop reason: HOSPADM

## 2022-02-02 RX ORDER — HYDRALAZINE HYDROCHLORIDE 10 MG/1
10 TABLET, FILM COATED ORAL 3 TIMES DAILY
Qty: 270 TABLET | Refills: 1 | Status: SHIPPED | OUTPATIENT
Start: 2022-02-02 | End: 2022-05-13 | Stop reason: ALTCHOICE

## 2022-02-03 ENCOUNTER — TRANSCRIBE ORDERS (OUTPATIENT)
Dept: LAB | Facility: HOSPITAL | Age: 82
End: 2022-02-03

## 2022-02-03 ENCOUNTER — LAB (OUTPATIENT)
Dept: LAB | Facility: HOSPITAL | Age: 82
End: 2022-02-03

## 2022-02-03 DIAGNOSIS — L03.115 CELLULITIS OF RIGHT FOOT: ICD-10-CM

## 2022-02-03 DIAGNOSIS — L03.031 ABSCESS OF FIFTH TOENAIL OF RIGHT FOOT: ICD-10-CM

## 2022-02-03 DIAGNOSIS — L02.611 ABSCESS OF RIGHT FOOT: ICD-10-CM

## 2022-02-03 DIAGNOSIS — L02.611 ABSCESS OF RIGHT FOOT: Primary | ICD-10-CM

## 2022-02-03 DIAGNOSIS — M86.171 ACUTE OSTEOMYELITIS OF RIGHT ANKLE OR FOOT: ICD-10-CM

## 2022-02-03 LAB
ALBUMIN SERPL-MCNC: 3 G/DL (ref 3.5–5.2)
ALBUMIN/GLOB SERPL: 1.2 G/DL
ALP SERPL-CCNC: 98 U/L (ref 39–117)
ALT SERPL W P-5'-P-CCNC: 25 U/L (ref 1–33)
ANION GAP SERPL CALCULATED.3IONS-SCNC: 11 MMOL/L (ref 5–15)
AST SERPL-CCNC: 26 U/L (ref 1–32)
BASOPHILS # BLD AUTO: 0.02 10*3/MM3 (ref 0–0.2)
BASOPHILS NFR BLD AUTO: 0.4 % (ref 0–1.5)
BILIRUB SERPL-MCNC: 0.6 MG/DL (ref 0–1.2)
BUN SERPL-MCNC: 66 MG/DL (ref 8–23)
BUN/CREAT SERPL: 51.2 (ref 7–25)
CALCIUM SPEC-SCNC: 8.8 MG/DL (ref 8.6–10.5)
CHLORIDE SERPL-SCNC: 104 MMOL/L (ref 98–107)
CO2 SERPL-SCNC: 27 MMOL/L (ref 22–29)
CREAT SERPL-MCNC: 1.29 MG/DL (ref 0.57–1)
DEPRECATED RDW RBC AUTO: 55.8 FL (ref 37–54)
EOSINOPHIL # BLD AUTO: 0.14 10*3/MM3 (ref 0–0.4)
EOSINOPHIL NFR BLD AUTO: 2.9 % (ref 0.3–6.2)
ERYTHROCYTE [DISTWIDTH] IN BLOOD BY AUTOMATED COUNT: 16.7 % (ref 12.3–15.4)
GFR SERPL CREATININE-BSD FRML MDRD: 40 ML/MIN/1.73
GLOBULIN UR ELPH-MCNC: 2.6 GM/DL
GLUCOSE SERPL-MCNC: 322 MG/DL (ref 65–99)
HCT VFR BLD AUTO: 31 % (ref 34–46.6)
HGB BLD-MCNC: 10.3 G/DL (ref 12–15.9)
IMM GRANULOCYTES # BLD AUTO: 0.02 10*3/MM3 (ref 0–0.05)
IMM GRANULOCYTES NFR BLD AUTO: 0.4 % (ref 0–0.5)
LYMPHOCYTES # BLD AUTO: 0.98 10*3/MM3 (ref 0.7–3.1)
LYMPHOCYTES NFR BLD AUTO: 20 % (ref 19.6–45.3)
MCH RBC QN AUTO: 30.4 PG (ref 26.6–33)
MCHC RBC AUTO-ENTMCNC: 33.2 G/DL (ref 31.5–35.7)
MCV RBC AUTO: 91.4 FL (ref 79–97)
MONOCYTES # BLD AUTO: 0.39 10*3/MM3 (ref 0.1–0.9)
MONOCYTES NFR BLD AUTO: 7.9 % (ref 5–12)
NEUTROPHILS NFR BLD AUTO: 3.36 10*3/MM3 (ref 1.7–7)
NEUTROPHILS NFR BLD AUTO: 68.4 % (ref 42.7–76)
NRBC BLD AUTO-RTO: 0 /100 WBC (ref 0–0.2)
PLATELET # BLD AUTO: 176 10*3/MM3 (ref 140–450)
PMV BLD AUTO: 10.1 FL (ref 6–12)
POTASSIUM SERPL-SCNC: 4.3 MMOL/L (ref 3.5–5.2)
PROT SERPL-MCNC: 5.6 G/DL (ref 6–8.5)
RBC # BLD AUTO: 3.39 10*6/MM3 (ref 3.77–5.28)
SODIUM SERPL-SCNC: 142 MMOL/L (ref 136–145)
WBC NRBC COR # BLD: 4.91 10*3/MM3 (ref 3.4–10.8)

## 2022-02-03 PROCEDURE — 36415 COLL VENOUS BLD VENIPUNCTURE: CPT

## 2022-02-03 PROCEDURE — 80053 COMPREHEN METABOLIC PANEL: CPT

## 2022-02-03 PROCEDURE — 85025 COMPLETE CBC W/AUTO DIFF WBC: CPT

## 2022-02-25 ENCOUNTER — LAB (OUTPATIENT)
Dept: LAB | Facility: HOSPITAL | Age: 82
End: 2022-02-25

## 2022-02-25 ENCOUNTER — OFFICE VISIT (OUTPATIENT)
Dept: INTERNAL MEDICINE | Facility: CLINIC | Age: 82
End: 2022-02-25

## 2022-02-25 VITALS
SYSTOLIC BLOOD PRESSURE: 142 MMHG | HEART RATE: 69 BPM | WEIGHT: 199 LBS | RESPIRATION RATE: 16 BRPM | HEIGHT: 64 IN | BODY MASS INDEX: 33.97 KG/M2 | DIASTOLIC BLOOD PRESSURE: 80 MMHG | TEMPERATURE: 97.2 F | OXYGEN SATURATION: 95 %

## 2022-02-25 DIAGNOSIS — E11.65 TYPE 2 DIABETES MELLITUS WITH HYPERGLYCEMIA, WITH LONG-TERM CURRENT USE OF INSULIN: ICD-10-CM

## 2022-02-25 DIAGNOSIS — Z89.431 RIGHT FOOT AMPUTEE: Primary | ICD-10-CM

## 2022-02-25 DIAGNOSIS — G62.9 NEUROPATHY: ICD-10-CM

## 2022-02-25 DIAGNOSIS — Z79.4 TYPE 2 DIABETES MELLITUS WITH HYPERGLYCEMIA, WITH LONG-TERM CURRENT USE OF INSULIN: ICD-10-CM

## 2022-02-25 DIAGNOSIS — I50.9 CONGESTIVE HEART FAILURE, UNSPECIFIED HF CHRONICITY, UNSPECIFIED HEART FAILURE TYPE: ICD-10-CM

## 2022-02-25 DIAGNOSIS — M54.50 LOW BACK PAIN, UNSPECIFIED BACK PAIN LATERALITY, UNSPECIFIED CHRONICITY, UNSPECIFIED WHETHER SCIATICA PRESENT: ICD-10-CM

## 2022-02-25 DIAGNOSIS — N32.81 OAB (OVERACTIVE BLADDER): ICD-10-CM

## 2022-02-25 DIAGNOSIS — N18.32 STAGE 3B CHRONIC KIDNEY DISEASE: ICD-10-CM

## 2022-02-25 LAB
HBA1C MFR BLD: 9.6 % (ref 4.8–5.6)
NT-PROBNP SERPL-MCNC: 1933 PG/ML (ref 0–1800)

## 2022-02-25 PROCEDURE — 83036 HEMOGLOBIN GLYCOSYLATED A1C: CPT | Performed by: INTERNAL MEDICINE

## 2022-02-25 PROCEDURE — 99214 OFFICE O/P EST MOD 30 MIN: CPT | Performed by: INTERNAL MEDICINE

## 2022-02-25 PROCEDURE — 83880 ASSAY OF NATRIURETIC PEPTIDE: CPT | Performed by: INTERNAL MEDICINE

## 2022-02-25 PROCEDURE — 80053 COMPREHEN METABOLIC PANEL: CPT | Performed by: INTERNAL MEDICINE

## 2022-02-25 RX ORDER — OXYBUTYNIN CHLORIDE 5 MG/1
TABLET ORAL EVERY 12 HOURS SCHEDULED
COMMUNITY
Start: 2022-01-26 | End: 2022-04-21

## 2022-02-25 RX ORDER — AMOXICILLIN AND CLAVULANATE POTASSIUM 875; 125 MG/1; MG/1
1 TABLET, FILM COATED ORAL DAILY
COMMUNITY
Start: 2022-02-21 | End: 2022-09-03 | Stop reason: HOSPADM

## 2022-02-25 RX ORDER — HYDRALAZINE HYDROCHLORIDE AND ISOSORBIDE DINITRATE 37.5; 2 MG/1; MG/1
TABLET, FILM COATED ORAL
COMMUNITY
End: 2022-02-25

## 2022-02-25 RX ORDER — CLOTRIMAZOLE AND BETAMETHASONE DIPROPIONATE 10; .64 MG/G; MG/G
CREAM TOPICAL
COMMUNITY
End: 2022-03-22 | Stop reason: SDUPTHER

## 2022-02-25 RX ORDER — LIDOCAINE 50 MG/G
PATCH TOPICAL
COMMUNITY
End: 2022-05-13 | Stop reason: ALTCHOICE

## 2022-02-26 LAB
ALBUMIN SERPL-MCNC: 3.4 G/DL (ref 3.5–5.2)
ALBUMIN/GLOB SERPL: 1.3 G/DL
ALP SERPL-CCNC: 115 U/L (ref 39–117)
ALT SERPL W P-5'-P-CCNC: 30 U/L (ref 1–33)
ANION GAP SERPL CALCULATED.3IONS-SCNC: 11.5 MMOL/L (ref 5–15)
AST SERPL-CCNC: 26 U/L (ref 1–32)
BILIRUB SERPL-MCNC: 0.7 MG/DL (ref 0–1.2)
BUN SERPL-MCNC: 54 MG/DL (ref 8–23)
BUN/CREAT SERPL: 43.5 (ref 7–25)
CALCIUM SPEC-SCNC: 8.7 MG/DL (ref 8.6–10.5)
CHLORIDE SERPL-SCNC: 103 MMOL/L (ref 98–107)
CO2 SERPL-SCNC: 23.5 MMOL/L (ref 22–29)
CREAT SERPL-MCNC: 1.24 MG/DL (ref 0.57–1)
GFR SERPL CREATININE-BSD FRML MDRD: 41 ML/MIN/1.73
GLOBULIN UR ELPH-MCNC: 2.6 GM/DL
GLUCOSE SERPL-MCNC: 368 MG/DL (ref 65–99)
POTASSIUM SERPL-SCNC: 4.5 MMOL/L (ref 3.5–5.2)
PROT SERPL-MCNC: 6 G/DL (ref 6–8.5)
SODIUM SERPL-SCNC: 138 MMOL/L (ref 136–145)

## 2022-02-28 ENCOUNTER — PATIENT ROUNDING (BHMG ONLY) (OUTPATIENT)
Dept: INTERNAL MEDICINE | Facility: CLINIC | Age: 82
End: 2022-02-28

## 2022-02-28 ENCOUNTER — TELEPHONE (OUTPATIENT)
Dept: INTERNAL MEDICINE | Facility: CLINIC | Age: 82
End: 2022-02-28

## 2022-02-28 DIAGNOSIS — M25.559 HIP PAIN: ICD-10-CM

## 2022-02-28 DIAGNOSIS — M54.50 LOW BACK PAIN, UNSPECIFIED BACK PAIN LATERALITY, UNSPECIFIED CHRONICITY, UNSPECIFIED WHETHER SCIATICA PRESENT: Primary | ICD-10-CM

## 2022-02-28 NOTE — TELEPHONE ENCOUNTER
Caller: BASHIR/SOCORRO    Relationship: Other/OUTPATIENT THERAPY    Best call back number: [phone]    What is the best time to reach you: ANYTIME    Who are you requesting to speak with (clinical staff, provider,  specific staff member): CLINICAL STAFF OR PROVIDER    What was the call regarding: BASHIR STATES THAT SHE CAM SEE PATIENT FOR RIGHT FOOT AMPUTEE BUT IF PATIENT IS NEEDING BACK AND HIP THERAPY SHE WILL NEED A NEW ORDER. PLEASE ADVISE    Do you require a callback: YES

## 2022-03-10 ENCOUNTER — TELEPHONE (OUTPATIENT)
Dept: INTERNAL MEDICINE | Facility: CLINIC | Age: 82
End: 2022-03-10

## 2022-03-10 NOTE — TELEPHONE ENCOUNTER
Caller: BASHIR    Relationship:  PHYSICAL THERAPY     Best call back number: [phone]    What is the medical concern/diagnosis: VESTIBULAR THERAPY     What specialty or service is being requested: VESTIBULAR THERAPY     What is the provider, practice or medical service name:  [facility] OUTPATIENT PHYSICAL THERAPY     What is the office location: [facility]     Any additional details: REQUESTING A REFERRAL   PLEAS [phone]

## 2022-03-22 ENCOUNTER — OFFICE VISIT (OUTPATIENT)
Dept: INTERNAL MEDICINE | Facility: CLINIC | Age: 82
End: 2022-03-22

## 2022-03-22 VITALS
DIASTOLIC BLOOD PRESSURE: 80 MMHG | RESPIRATION RATE: 18 BRPM | OXYGEN SATURATION: 98 % | BODY MASS INDEX: 33.63 KG/M2 | HEIGHT: 64 IN | TEMPERATURE: 98.6 F | HEART RATE: 82 BPM | SYSTOLIC BLOOD PRESSURE: 156 MMHG | WEIGHT: 197 LBS

## 2022-03-22 DIAGNOSIS — R32 URINARY INCONTINENCE, UNSPECIFIED TYPE: ICD-10-CM

## 2022-03-22 DIAGNOSIS — I50.9 CONGESTIVE HEART FAILURE, UNSPECIFIED HF CHRONICITY, UNSPECIFIED HEART FAILURE TYPE: ICD-10-CM

## 2022-03-22 DIAGNOSIS — Z79.4 TYPE 2 DIABETES MELLITUS WITH HYPERGLYCEMIA, WITH LONG-TERM CURRENT USE OF INSULIN: Primary | ICD-10-CM

## 2022-03-22 DIAGNOSIS — R00.2 PALPITATIONS: ICD-10-CM

## 2022-03-22 DIAGNOSIS — E11.65 TYPE 2 DIABETES MELLITUS WITH HYPERGLYCEMIA, WITH LONG-TERM CURRENT USE OF INSULIN: Primary | ICD-10-CM

## 2022-03-22 PROCEDURE — 99214 OFFICE O/P EST MOD 30 MIN: CPT | Performed by: INTERNAL MEDICINE

## 2022-03-22 RX ORDER — ALLOPURINOL 300 MG/1
300 TABLET ORAL DAILY
Qty: 60 TABLET | Refills: 10 | Status: ON HOLD | OUTPATIENT
Start: 2022-03-22 | End: 2022-08-21

## 2022-03-22 RX ORDER — FLUCONAZOLE 200 MG/1
TABLET ORAL
COMMUNITY
End: 2022-03-22

## 2022-03-22 RX ORDER — INSULIN GLARGINE 100 [IU]/ML
35 INJECTION, SOLUTION SUBCUTANEOUS DAILY
Qty: 10 ML | Refills: 5 | Status: SHIPPED | OUTPATIENT
Start: 2022-03-22 | End: 2022-04-20

## 2022-03-22 RX ORDER — CLOTRIMAZOLE AND BETAMETHASONE DIPROPIONATE 10; .64 MG/G; MG/G
CREAM TOPICAL 2 TIMES DAILY
Qty: 15 G | Refills: 2 | Status: SHIPPED | OUTPATIENT
Start: 2022-03-22 | End: 2022-08-12 | Stop reason: HOSPADM

## 2022-03-22 RX ORDER — LANCETS
EACH MISCELLANEOUS
Qty: 90 EACH | Refills: 10 | Status: ON HOLD | OUTPATIENT
Start: 2022-03-22 | End: 2022-08-21

## 2022-03-22 RX ORDER — SYRING-NEEDL,DISP,INSUL,0.3 ML 31GX15/64"
SYRINGE, EMPTY DISPOSABLE MISCELLANEOUS
COMMUNITY
End: 2022-03-22 | Stop reason: SDUPTHER

## 2022-03-22 RX ORDER — SYRING-NEEDL,DISP,INSUL,0.3 ML 31GX15/64"
SYRINGE, EMPTY DISPOSABLE MISCELLANEOUS
Qty: 90 EACH | Refills: 10 | Status: ON HOLD | OUTPATIENT
Start: 2022-03-22 | End: 2022-08-21

## 2022-03-22 NOTE — TELEPHONE ENCOUNTER
..    Caller: Susan Anderson    Relationship: Self    Best call back number:     Requested Prescriptions:   Requested Prescriptions     Pending Prescriptions Disp Refills   • clotrimazole-betamethasone (LOTRISONE) 1-0.05 % cream          Pharmacy where request should be sent: [facility]. [place], [place] - [facility] [address] - [phone]  - [phone]     Additional details provided by patient: PATIENT IS OUT OF MEDICATION AND USUALLY GETS 2 TUBES    Does the patient have less than a 3 day supply:  [x] Yes  [] No    Kenyon Cole Rep   03/22/22 14:34 EDT

## 2022-03-29 ENCOUNTER — TELEPHONE (OUTPATIENT)
Dept: INTERNAL MEDICINE | Facility: CLINIC | Age: 82
End: 2022-03-29

## 2022-03-29 RX ORDER — SYRINGE,INSUL U-500,NDL,0.5ML 31GX15/64"
3 SYRINGE, EMPTY DISPOSABLE MISCELLANEOUS DAILY PRN
Qty: 300 EACH | Refills: 5 | Status: ON HOLD | OUTPATIENT
Start: 2022-03-29 | End: 2022-08-21

## 2022-03-29 NOTE — TELEPHONE ENCOUNTER
Pharmacy Name: [facility]         Pharmacy representative phone number: [phone]    What medication are you calling in regards to: SYRINGES BD VEO INSULIN 31 NAEEM 6 MM    What question does the pharmacy have: THE PHRAMACY WOULD LIKE TO CLEAIFY THE DIRECTIONS AND THE QUANTITY TO DISPENSE FOR A 90 DAY SUPPLY     Who is the provider that prescribed the medication: DOCTOR HO

## 2022-04-04 NOTE — ANESTHESIA POSTPROCEDURE EVALUATION
Last seen:3/29/22    Follow up: none, nothing noted in chart at this time    tiZANidine (ZANAFLEX) 4 MG tablet 30 tablet 0 3/29/2022     Si po qhs      meloxicam (MOBIC) 15 MG tablet 30 tablet 0 3/29/2022     Sig - Route: Take 1 tablet by mouth daily. - Oral      Prescribed by Dr. Campos.    Left message for patient to return call to get more information.           Patient: Susan Anderson    Procedure Summary     Date: 04/14/21 Room / Location:  [facility]    Anesthesia Start: 1237 Anesthesia Stop: 1418    Procedure: AMPUTATION TRANS METATARSAL Right great toe (Right Fingers) Diagnosis:       Other acute osteomyelitis of right foot (CMS/HCC)      (Other acute osteomyelitis of right foot (CMS/HCC) [M86.171])    Surgeons: Valdez Finney MD Provider: Donell Diego MD    Anesthesia Type: general with block ASA Status: 3          Anesthesia Type: general with block    Vitals  No vitals data found for the desired time range.          Post Anesthesia Care and Evaluation    Patient location during evaluation: PACU  Patient participation: complete - patient participated  Level of consciousness: awake and alert  Pain management: adequate  Airway patency: patent  Anesthetic complications: No anesthetic complications  PONV Status: none  Cardiovascular status: hemodynamically stable and acceptable  Respiratory status: nonlabored ventilation, acceptable and nasal cannula  Hydration status: acceptable

## 2022-04-18 RX ORDER — ATORVASTATIN CALCIUM 80 MG/1
80 TABLET, FILM COATED ORAL DAILY
Qty: 30 TABLET | Refills: 0 | Status: SHIPPED | OUTPATIENT
Start: 2022-04-18 | End: 2022-05-11

## 2022-04-20 DIAGNOSIS — E11.65 TYPE 2 DIABETES MELLITUS WITH HYPERGLYCEMIA, WITH LONG-TERM CURRENT USE OF INSULIN: ICD-10-CM

## 2022-04-20 DIAGNOSIS — Z79.4 TYPE 2 DIABETES MELLITUS WITH HYPERGLYCEMIA, WITH LONG-TERM CURRENT USE OF INSULIN: ICD-10-CM

## 2022-04-20 RX ORDER — INSULIN GLARGINE 100 [IU]/ML
INJECTION, SOLUTION SUBCUTANEOUS
Qty: 10 ML | Refills: 5 | Status: ON HOLD | OUTPATIENT
Start: 2022-04-20 | End: 2022-08-12 | Stop reason: SDUPTHER

## 2022-04-21 ENCOUNTER — OFFICE VISIT (OUTPATIENT)
Dept: NEUROLOGY | Facility: CLINIC | Age: 82
End: 2022-04-21

## 2022-04-21 VITALS
HEIGHT: 64 IN | BODY MASS INDEX: 33.63 KG/M2 | SYSTOLIC BLOOD PRESSURE: 132 MMHG | HEART RATE: 60 BPM | OXYGEN SATURATION: 96 % | DIASTOLIC BLOOD PRESSURE: 82 MMHG | RESPIRATION RATE: 12 BRPM | WEIGHT: 197 LBS

## 2022-04-21 DIAGNOSIS — G89.29 CHRONIC BILATERAL LOW BACK PAIN WITH BILATERAL SCIATICA: Primary | ICD-10-CM

## 2022-04-21 DIAGNOSIS — M54.42 CHRONIC BILATERAL LOW BACK PAIN WITH BILATERAL SCIATICA: Primary | ICD-10-CM

## 2022-04-21 DIAGNOSIS — G62.9 NEUROPATHY: ICD-10-CM

## 2022-04-21 DIAGNOSIS — M54.41 CHRONIC BILATERAL LOW BACK PAIN WITH BILATERAL SCIATICA: Primary | ICD-10-CM

## 2022-04-21 PROCEDURE — 99204 OFFICE O/P NEW MOD 45 MIN: CPT | Performed by: PSYCHIATRY & NEUROLOGY

## 2022-04-21 NOTE — PROGRESS NOTES
Subjective:    CC: Susan Anderson is seen today in consultation at the request of Arleen Doherty MD for chronic low back pain, Peripheral Neuropathy       HPI:  Patient is a 82-year-old female with past medical history of type 2 diabetes, chronic low back pain referred to the clinic for evaluation of low back pain, peripheral neuropathy and difficulty with walking.  She reports that she has had low back pain for many years.  Approximately 20 years ago, she received epidural shot for her low back pain and it helped her significantly and she was able to function normally, walk normally without any issues until about January 2022 when she started having recurrence of significant back pain which has affected her ability to walk.  She is currently wheelchair-bound and has to use wheelchair for any longer distances.  She reports that she is able to stand up without any problem at the moment she takes few steps, low back pain becomes severe and it shoots down to both her legs.  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  She reports that however the amputated toes does not really affect her ability to walk.  She does have peripheral neuropathy and she is on Lyrica 100 mg in the morning and 200 mg at night which does help with symptoms of neuropathy.  She recently underwent MRI of lumbosacral spine which I reviewed personally.  It showed severe spinal canal stenosis at L2-L3, L3-L4 and L4-L5 levels-most prominent changes are at L4-L5.  Because of her age and comorbidities, I do not think that she is a good surgical candidate.  She was referred to's be seen by pain management back in January 2022 but she reports that she has not had anything.  Is interested in having repeat lumbar epidural injection done.  She also is getting physical therapy twice in a week at [facility].  The following portions of the patient's history were reviewed today and  "updated as of 04/21/2022  : allergies, social history and problem list.  This document will be scanned to patient's chart.      Current Outpatient Medications:   •  Accu-Chek FastClix Lancets misc, Use TID, Disp: 90 each, Rfl: 10  •  Accu-Chek SmartView test strip, TID daily prn, Disp: , Rfl:   •  allopurinol (ZYLOPRIM) 300 MG tablet, Take 1 tablet by mouth Daily., Disp: 60 tablet, Rfl: 10  •  amoxicillin-clavulanate (AUGMENTIN) 875-125 MG per tablet, , Disp: , Rfl:   •  aspirin 81 MG EC tablet, Take 1 tablet by mouth Daily., Disp: , Rfl:   •  atorvastatin (LIPITOR) 80 MG tablet, TAKE 1 TABLET BY MOUTH DAILY., Disp: 30 tablet, Rfl: 0  •  carvedilol (COREG) 12.5 MG tablet, Take 1 tablet by mouth 2 (Two) Times a Day., Disp: 60 tablet, Rfl: 11  •  clotrimazole-betamethasone (LOTRISONE) 1-0.05 % cream, Apply  topically to the appropriate area as directed 2 (Two) Times a Day., Disp: 15 g, Rfl: 2  •  cyclobenzaprine (FLEXERIL) 10 MG tablet, Take 1 tablet by mouth 3 (Three) Times a Day As Needed for Muscle Spasms for up to 15 doses., Disp: 15 tablet, Rfl: 0  •  furosemide (LASIX) 40 MG tablet, Take 0.5 tablets by mouth Daily., Disp: 90 tablet, Rfl:   •  glucose blood test strip, Accu-Chek SmartView Test Strips, Disp: , Rfl:   •  glucose blood test strip, Use as instructed, Disp: 90 each, Rfl: 10  •  hydrALAZINE (APRESOLINE) 10 MG tablet, Take 1 tablet by mouth 3 (Three) Times a Day., Disp: 270 tablet, Rfl: 1  •  HYDROcodone-acetaminophen (NORCO) 5-325 MG per tablet, Take 1 tablet by mouth Every 6 (Six) Hours As Needed for Moderate Pain  for up to 15 doses., Disp: 15 tablet, Rfl: 0  •  insulin lispro (HumaLOG) 100 UNIT/ML injection, For use at mealtimes and per correctional scale, MDD 50 units, Disp: 20 mL, Rfl: 5  •  Insulin Syringe-Needle U-100 (BD Veo Insulin Syringe U/F) 31G X 15/64\" 0.3 ML misc, Check CBG TID, Disp: 90 each, Rfl: 10  •  Insulin Syringe-Needle U-100 31G X 15/64\" 1 ML misc, BD Veo Insulin Syringe " "Ultra-Fine 1 mL 31 gauge x 15/64\", Disp: , Rfl:   •  Insulin Syringe/Needle U-500 (BD Insulin Syringe U-500) 31G X 6MM 0.5 ML misc, 3 applicators Daily As Needed (for glucose please adjust to 90 day supply if needed e11.9)., Disp: 300 each, Rfl: 5  •  isosorbide dinitrate (ISORDIL) 20 MG tablet, Take 1 tablet by mouth 2 (Two) Times a Day., Disp: 180 tablet, Rfl: 3  •  Lantus 100 UNIT/ML injection, INJECT 30 UNITS UNDER THE SKIN INTO THE APPROPRIATE AREA AS DIRECTED DAILY., Disp: 10 mL, Rfl: 5  •  lidocaine (LIDODERM) 5 %, lidocaine 5 % topical patch, Disp: , Rfl:   •  Nutritional Supplements (Dez) powder, Take  by mouth 2 (Two) Times a Day., Disp: , Rfl:   •  ondansetron ODT (ZOFRAN-ODT) 4 MG disintegrating tablet, Place 1 tablet on the tongue Every 6 (Six) Hours As Needed for Nausea or Vomiting for up to 15 doses., Disp: 15 tablet, Rfl: 0  •  pregabalin (Lyrica) 100 MG capsule, 100mg in the morning and 200mg at night, Disp: 9 capsule, Rfl: 0  •  traMADol (ULTRAM) 50 MG tablet, Take 1 tablet by mouth 3 (Three) Times a Day As Needed., Disp: , Rfl:   •  vitamin B-12 (CYANOCOBALAMIN) 100 MCG tablet, Take 1 tablet by mouth Daily., Disp: , Rfl:    Past Medical History:   Diagnosis Date   • Arthritis    • Asthma 6/4 - double pneumonia    Currently on inhaler and nebulizer   • Cancer (HCC)     cervical cancer, skin cancer   • CHF (congestive heart failure) (HCC) June 4, 2021   • Chronic kidney disease Related to diabetes   • Coronary artery disease 6/4/2021 DX for hear failure   • Diabetes mellitus (HCC) 30 years    Seeing Dr. Lam 1st time Aug 19   • Gout    • Hx of colonoscopy    • Hyperlipidemia Reference current labs x 2-3yrs approx   • Hypertension 30 years   • Migraine    • Mitral valve disease    • Mitral valve disease    • Osteomyelitis (HCC)    • Peripheral neuropathy    • Sleep apnea    • Type 2 diabetes mellitus (HCC)     30 years      Past Surgical History:   Procedure Laterality Date   • ABDOMINAL " "HYSTERECTOMY W/SALPINGECTOMY     • AMPUTATION  Right great toe, 1st 1/3 metatarsal -Reg 4/14/21   • AORTAGRAM N/A 4/9/2021    Procedure: AORTAGRAM WITH OR WITHOUT RUNOFFS, WITH Co2;  Surgeon: Trey Forrest MD;  Location: [facility] HYBRID [facility];  Service: Vascular;  Laterality: N/A;   • APPENDECTOMY     • BRAIN TUMOR EXCISION      laser surgery    • CARDIAC CATHETERIZATION N/A 6/21/2021    Procedure: LEFT HEART CATH;  Surgeon: Anjum Ceballos MD;  Location: [facility] CATH INVASIVE LOCATION;  Service: Cardiology;  Laterality: N/A;   • CATARACT EXTRACTION W/ INTRAOCULAR LENS  IMPLANT, BILATERAL     • CHOLECYSTECTOMY     • EYE SURGERY     • HYSTERECTOMY     • TOE SURGERY     • TRANS METATARSAL AMPUTATION Right 4/14/2021    Procedure: AMPUTATION TRANS METATARSAL RIGHT GREAT TOE;  Surgeon: Valdez Finney MD;  Location: [facility] OR;  Service: Vascular;  Laterality: Right;      Family History   Problem Relation Age of Onset   • Diabetes Mother         Type II   • Heart attack Father    • Coronary artery disease Father    • Diabetes Father         Type II      Review of Systems    All other systems reviewed and are negative     Objective:    /82   Pulse 60   Resp 12   Ht 162.6 cm (64\")   Wt 89.4 kg (197 lb)   SpO2 96%   BMI 33.81 kg/m²     Neurology Exam:    General apperance: NAD.     Mental status: Alert, awake and oriented to time place and person.    Recent and Remote memory: Can recall 3/3 objects at 5 minutes. Can recall historical events.     Attention span and Concentration: Serial 7s: Normal.     Fund of knowledge:  Normal.     Language and Speech: No aphasia or dysarthria.    Naming , Repitition and Comprehension:  Can name objects, repeat a sentence and follow commands. Speech is clear and fluent with good repetition, comprehension, and naming.    Cranial Nerves:   CN II: Visual fields are full. Intact. Fundi - Normal, No papillederma, Pupils - STONEY  CN III, IV and VI: Extraocular movements are intact. Normal " saccades.   CN V: Facial sensation is intact.   CN VII: Muscles of facial expression reveal no asymmetry. Intact.   CN VIII: Hearing is intact. Whispered voice intact.   CN IX and X: Palate elevates symmetrically. Intact  CN XI: Shoulder shrug is intact.   CN XII: Tongue is midline without evidence of atrophy or fasciculation.     Motor:  Right UE muscle strength 5/5. Normal tone.     Left UE muscle strength 5/5. Normal tone.      Right LE muscle strength5/5. Normal tone.     Left LE muscle strength 5/5. Normal tone.      Sensory: Reduced light touch, vibration and pinprick sensation bilaterally.    DTRs: 1+ bilaterally in upper and lower extremities.    Babinski: Negative bilaterally.    Co-ordination: Normal finger-to-nose, heel to shin B/L.    Rhomberg: Negative.    Gait: She is able to stand from wheelchair and can take few steps but then has to stop as back pain becomes severe.    Cardiovascular: Regular rate and rhythm without murmur, gallop or rub.    Ophthalmoscopic exam: Normal fundi, no papilledema.    Assessment and Plan:  1. Chronic bilateral low back pain with bilateral sciatica  2.  Severe spinal canal stenosis.  3.  Severe diabetic peripheral neuropathy.  -Patient with long-term history of chronic low back pain.  She responded well to epidural injection in the past and had had almost complete resolution of back pain for almost 20 years.  In January 2022, pain returned and since then, it has affected her ability to walk significantly.  Recently performed MRI of lumbosacral spine shows severe spinal canal stenosis at L2-L3, L3-L4 and L4-L5 levels.  She will need interventional pain management to help with the symptoms.  She is getting physical therapy twice in a week which helps some but still the pain is severe.  She is currently on Lyrica 100 mg in the morning and 200 mg at night which helps with symptoms of diabetic neuropathy but it has not helped with back pain.  Unfortunately, I will not be able  to increase her Lyrica dose any further because of potential side effects.  I will be sending urgent referral to pain management to consider lumbar epidural steroid injection to help with ongoing symptoms.  I will plan to see her clinic in as needed.- Ambulatory Referral to Pain Management       Return if symptoms worsen or fail to improve.     Jason Pa MD

## 2022-04-28 ENCOUNTER — TELEPHONE (OUTPATIENT)
Dept: INTERNAL MEDICINE | Facility: CLINIC | Age: 82
End: 2022-04-28

## 2022-04-28 NOTE — TELEPHONE ENCOUNTER
Pt bp reading have been has been 180/88, 180/74, 180/86. Appointment with Levon scheduled for tomorrow

## 2022-04-28 NOTE — TELEPHONE ENCOUNTER
Caller: Susan Anderson    Relationship: Self    Best call back number:[phone]     What was the call regarding: PATIENT CALLED STATING THAT IN THE PAST 05 DAYS HER BLOOD PRESSURE HAS BEEN HIGH.  PATIENT ASKED IF SHE NEEDS BE ON A NEW BLOOD PRESSURE MEDICATION OR IF SHE NEEDS AN ADDITIONAL TO WHAT SHE IS CURRENTLY TAKING.    Do you require a callback:YES

## 2022-04-29 ENCOUNTER — OFFICE VISIT (OUTPATIENT)
Dept: INTERNAL MEDICINE | Facility: CLINIC | Age: 82
End: 2022-04-29

## 2022-04-29 VITALS
DIASTOLIC BLOOD PRESSURE: 78 MMHG | TEMPERATURE: 96.9 F | HEART RATE: 65 BPM | HEIGHT: 64 IN | OXYGEN SATURATION: 95 % | SYSTOLIC BLOOD PRESSURE: 122 MMHG | BODY MASS INDEX: 33.88 KG/M2

## 2022-04-29 DIAGNOSIS — I10 ESSENTIAL HYPERTENSION: Primary | ICD-10-CM

## 2022-04-29 PROCEDURE — 99213 OFFICE O/P EST LOW 20 MIN: CPT | Performed by: PHYSICIAN ASSISTANT

## 2022-04-29 RX ORDER — AMLODIPINE BESYLATE 5 MG/1
5 TABLET ORAL DAILY
Qty: 30 TABLET | Refills: 5 | Status: SHIPPED | OUTPATIENT
Start: 2022-04-29 | End: 2022-05-16 | Stop reason: SDUPTHER

## 2022-04-29 NOTE — PROGRESS NOTES
[facility]  [address]  [place] [place] [place] [facility] [address]  Dept: [phone]  Dept Fax: [phone]  Loc: [phone]  Loc Fax: [phone]    Susan Anderson  1940    Follow Up Office Visit Note    History of Present Illness:  Patient is an 82-year-old female in today for high blood pressure.  On hydralazine 10 mg 3 times daily and Coreg 12.5 mg twice daily.  Has been noted at home readings around 180/89 and physical therapy got the same reading with a manual cuff when she was there doing physical therapy at the patient's home this week.  Patient reports blood pressures been running higher over the last couple of weeks.  Denies any associated symptoms.  Blood pressure today in office was 120/80 and 122/78.  Denies any symptoms today.      The following portions of the patient's history were reviewed and updated as appropriate: allergies, current medications, past family history, past medical history, past social history, past surgical history, and problem list.    Medications:    Current Outpatient Medications:   •  Accu-Chek FastClix Lancets misc, Use TID, Disp: 90 each, Rfl: 10  •  Accu-Chek SmartView test strip, TID daily prn, Disp: , Rfl:   •  allopurinol (ZYLOPRIM) 300 MG tablet, Take 1 tablet by mouth Daily., Disp: 60 tablet, Rfl: 10  •  amLODIPine (NORVASC) 5 MG tablet, Take 1 tablet by mouth Daily., Disp: 30 tablet, Rfl: 5  •  amoxicillin-clavulanate (AUGMENTIN) 875-125 MG per tablet, , Disp: , Rfl:   •  aspirin 81 MG EC tablet, Take 1 tablet by mouth Daily., Disp: , Rfl:   •  atorvastatin (LIPITOR) 80 MG tablet, TAKE 1 TABLET BY MOUTH DAILY., Disp: 30 tablet, Rfl: 0  •  carvedilol (COREG) 12.5 MG tablet, Take 1 tablet by mouth 2 (Two) Times a Day., Disp: 60 tablet, Rfl: 11  •  clotrimazole-betamethasone (LOTRISONE) 1-0.05 % cream, Apply  topically to the appropriate area as directed 2 (Two) Times a Day., Disp: 15 g, Rfl: 2  •  cyclobenzaprine  "(FLEXERIL) 10 MG tablet, Take 1 tablet by mouth 3 (Three) Times a Day As Needed for Muscle Spasms for up to 15 doses., Disp: 15 tablet, Rfl: 0  •  furosemide (LASIX) 40 MG tablet, Take 0.5 tablets by mouth Daily., Disp: 90 tablet, Rfl:   •  glucose blood test strip, Accu-Chek SmartView Test Strips, Disp: , Rfl:   •  glucose blood test strip, Use as instructed, Disp: 90 each, Rfl: 10  •  hydrALAZINE (APRESOLINE) 10 MG tablet, Take 1 tablet by mouth 3 (Three) Times a Day., Disp: 270 tablet, Rfl: 1  •  HYDROcodone-acetaminophen (NORCO) 5-325 MG per tablet, Take 1 tablet by mouth Every 6 (Six) Hours As Needed for Moderate Pain  for up to 15 doses., Disp: 15 tablet, Rfl: 0  •  insulin lispro (HumaLOG) 100 UNIT/ML injection, For use at mealtimes and per correctional scale, MDD 50 units, Disp: 20 mL, Rfl: 5  •  Insulin Syringe-Needle U-100 (BD Veo Insulin Syringe U/F) 31G X 15/64\" 0.3 ML misc, Check CBG TID, Disp: 90 each, Rfl: 10  •  Insulin Syringe-Needle U-100 31G X 15/64\" 1 ML misc, BD Veo Insulin Syringe Ultra-Fine 1 mL 31 gauge x 15/64\", Disp: , Rfl:   •  Insulin Syringe/Needle U-500 (BD Insulin Syringe U-500) 31G X 6MM 0.5 ML misc, 3 applicators Daily As Needed (for glucose please adjust to 90 day supply if needed e11.9)., Disp: 300 each, Rfl: 5  •  isosorbide dinitrate (ISORDIL) 20 MG tablet, Take 1 tablet by mouth 2 (Two) Times a Day., Disp: 180 tablet, Rfl: 3  •  Lantus 100 UNIT/ML injection, INJECT 30 UNITS UNDER THE SKIN INTO THE APPROPRIATE AREA AS DIRECTED DAILY., Disp: 10 mL, Rfl: 5  •  lidocaine (LIDODERM) 5 %, lidocaine 5 % topical patch, Disp: , Rfl:   •  Nutritional Supplements (Dez) powder, Take  by mouth 2 (Two) Times a Day., Disp: , Rfl:   •  ondansetron ODT (ZOFRAN-ODT) 4 MG disintegrating tablet, Place 1 tablet on the tongue Every 6 (Six) Hours As Needed for Nausea or Vomiting for up to 15 doses., Disp: 15 tablet, Rfl: 0  •  pregabalin (Lyrica) 100 MG capsule, 100mg in the morning and 200mg at " night, Disp: 9 capsule, Rfl: 0  •  traMADol (ULTRAM) 50 MG tablet, Take 1 tablet by mouth 3 (Three) Times a Day As Needed., Disp: , Rfl:   •  vitamin B-12 (CYANOCOBALAMIN) 100 MCG tablet, Take 1 tablet by mouth Daily., Disp: , Rfl:     Subjective  Allergies   Allergen Reactions   • Cephalexin Nausea Only   • Erythromycin Base Nausea Only   • Oxycodone Nausea Only   • Sulfa Antibiotics Nausea Only        Past Medical History:   Diagnosis Date   • Arthritis    • Asthma 6/4 - double pneumonia    Currently on inhaler and nebulizer   • Cancer (HCC)     cervical cancer, skin cancer   • CHF (congestive heart failure) ([facility]) June 4, 2021   • Chronic kidney disease Related to diabetes   • Coronary artery disease 6/4/2021 DX for hear failure   • Diabetes mellitus (HCC) 30 years    Seeing Dr. Lam 1st time Aug 19   • Gout    • Hx of colonoscopy    • Hyperlipidemia Reference current labs x 2-3yrs approx   • Hypertension 30 years   • Migraine    • Mitral valve disease    • Mitral valve disease    • Osteomyelitis (HCC)    • Peripheral neuropathy    • Sleep apnea    • Type 2 diabetes mellitus (HCC)     30 years       Past Surgical History:   Procedure Laterality Date   • ABDOMINAL HYSTERECTOMY W/SALPINGECTOMY     • AMPUTATION  Right great toe, 1st 1/3 metatarsal -Reg 4/14/21   • AORTAGRAM N/A 4/9/2021    Procedure: AORTAGRAM WITH OR WITHOUT RUNOFFS, WITH Co2;  Surgeon: Trey Forrest MD;  Location: [facility];  Service: Vascular;  Laterality: N/A;   • APPENDECTOMY     • BRAIN TUMOR EXCISION      laser surgery    • CARDIAC CATHETERIZATION N/A 6/21/2021    Procedure: LEFT HEART CATH;  Surgeon: Anjum Ceballos MD;  Location: [facility] CATH INVASIVE LOCATION;  Service: Cardiology;  Laterality: N/A;   • CATARACT EXTRACTION W/ INTRAOCULAR LENS  IMPLANT, BILATERAL     • CHOLECYSTECTOMY     • EYE SURGERY     • HYSTERECTOMY     • TOE SURGERY     • TRANS METATARSAL AMPUTATION Right 4/14/2021    Procedure: AMPUTATION TRANS METATARSAL  RIGHT GREAT TOE;  Surgeon: Valdez Finney MD;  [facility]  Service: Vascular;  Laterality: Right;       Family History   Problem Relation Age of Onset   • Diabetes Mother         Type II   • Heart attack Father    • Coronary artery disease Father    • Diabetes Father         Type II        Social History     Socioeconomic History   • Marital status:    • Number of children: 1   Tobacco Use   • Smoking status: Never Smoker   • Smokeless tobacco: Never Used   Vaping Use   • Vaping Use: Never used   Substance and Sexual Activity   • Alcohol use: Not Currently     Alcohol/week: 0.0 standard drinks     Comment: Social drinking when not recovering from hospitalization   • Drug use: Never   • Sexual activity: Not Currently     Birth control/protection: None       Review of Systems   Constitutional: Negative for activity change, chills, fatigue, fever and unexpected weight change.   HENT: Negative for congestion, ear pain, postnasal drip, sinus pressure and sore throat.    Eyes: Negative for pain, discharge and redness.   Respiratory: Negative for cough, shortness of breath and wheezing.    Cardiovascular: Negative for chest pain, palpitations and leg swelling.   Gastrointestinal: Negative for diarrhea, nausea and vomiting.   Endocrine: Negative for cold intolerance and heat intolerance.   Genitourinary: Negative for decreased urine volume and dysuria.   Musculoskeletal: Negative for arthralgias and myalgias.   Skin: Negative for rash and wound.   Neurological: Negative for dizziness, light-headedness and headaches.   Hematological: Does not bruise/bleed easily.   Psychiatric/Behavioral: Negative for confusion, dysphoric mood and sleep disturbance. The patient is not nervous/anxious.          Objective  Vitals:    04/29/22 1401 04/29/22 1426   BP: 124/80 122/78   BP Location: Right arm    Patient Position: Sitting    Pulse: 65    Temp: 96.9 °F (36.1 °C)    TempSrc: Infrared    SpO2: 95%    Height: 162.4 cm  "(63.94\")      Body mass index is 33.88 kg/m².     Physical Exam  Physical Exam  Vitals and nursing note reviewed.   Constitutional:       General: She is not in acute distress.     Appearance: She is not ill-appearing.   HENT:      Head: Normocephalic.      Right Ear: Tympanic membrane, ear canal and external ear normal. There is no impacted cerumen.      Left Ear: Tympanic membrane, ear canal and external ear normal. There is no impacted cerumen.      Nose: No congestion or rhinorrhea.      Mouth/Throat:      Mouth: Mucous membranes are moist.      Pharynx: Oropharynx is clear. No oropharyngeal exudate or posterior oropharyngeal erythema.   Eyes:      General:         Right eye: No discharge.         Left eye: No discharge.      Extraocular Movements: Extraocular movements intact.      Conjunctiva/sclera: Conjunctivae normal.      Pupils: Pupils are equal, round, and reactive to light.   Cardiovascular:      Rate and Rhythm: Normal rate and regular rhythm.      Heart sounds: Normal heart sounds. No murmur heard.    No friction rub. No gallop.   Pulmonary:      Effort: Pulmonary effort is normal. No respiratory distress.      Breath sounds: Normal breath sounds. No wheezing.   Abdominal:      General: Bowel sounds are normal. There is no distension.      Palpations: Abdomen is soft. There is no mass.      Tenderness: There is no abdominal tenderness.   Musculoskeletal:         General: No swelling. Normal range of motion.      Cervical back: Normal range of motion. No tenderness.      Right lower leg: No edema.      Left lower leg: No edema.   Lymphadenopathy:      Cervical: No cervical adenopathy.   Skin:     Findings: No bruising, erythema or rash.   Neurological:      Mental Status: She is oriented to person, place, and time.      Gait: Gait normal.   Psychiatric:         Mood and Affect: Mood normal.         Behavior: Behavior normal.         Thought Content: Thought content normal.         Judgment: Judgment " normal.         Diagnostic Data  Procedures    Assessment  Diagnoses and all orders for this visit:    1. Essential hypertension (Primary)  -     amLODIPine (NORVASC) 5 MG tablet; Take 1 tablet by mouth Daily.  Dispense: 30 tablet; Refill: 5        Plan    1. Essential hypertension (Primary)- on hydralazine 10 mg 3 times daily and Coreg 12.5 mg twice daily.  Advised to wean off hydralazine by taking 2 tablets daily for 2 days then 1 tablet daily for 2 days then stop.  Replace with Norvasc 5 mg daily due to systolic blood pressure being high with normal diastolic blood pressure. Advised patient to take blood pressure readings at home 2-3 times daily. Bring readings back to clinic at follow-up.  Advised if blood pressure higher than 160/100 or lower than 100/60 to call the office immediately. If symptomatic, go to the ER. Patient verbalized understanding of all instructions given.        Return in about 2 weeks (around 5/13/2022) for Recheck w/ Dr. Doherty.    Levon Byers PA-C  04/29/2022

## 2022-05-02 ENCOUNTER — TELEPHONE (OUTPATIENT)
Dept: INTERNAL MEDICINE | Facility: CLINIC | Age: 82
End: 2022-05-02

## 2022-05-02 NOTE — TELEPHONE ENCOUNTER
Caller: Susan Anderson    Relationship: Self    Best call back number:[phone]    What is the best time to reach you: AFTER 3     Who are you requesting to speak with (clinical staff, provider,  specific staff member): CLINICAL STAFF     What was the call regarding: PATIENT IS CONFUSED BY THE INSTRUCTIONS THAT SHE WAS GIVEN VERSUS WHAT WAS WRITTEN ON THE BOTTLES. SHE WOULD LIKE A CALL BACK TO DISCUSS.     Do you require a callback: YES

## 2022-05-11 RX ORDER — ATORVASTATIN CALCIUM 80 MG/1
80 TABLET, FILM COATED ORAL DAILY
Qty: 30 TABLET | Refills: 0 | Status: SHIPPED | OUTPATIENT
Start: 2022-05-11 | End: 2022-06-13

## 2022-05-11 NOTE — PROGRESS NOTES
"Chief Complaint: \"Lower back pain.\"        History of Present Illness:   Patient: Ms. Susan Anderson, 82 y.o. female   Referring Physician: Dr. Jason Pa  Reason for Referral: Consultation for chronic intractable lower back and left hip pain.   Pain History: Patient reports a 20-year history of progressive lower back and left hip pain, which began without incident. Patient complains of lower back pain that radiates into the left lower extremity pain into the left foot. She describes severe neurogenic claudication. Patient uses a walker for short distances otherwise, she uses a wheelchair. She has a history of peripheral neuropathy and is on Lyrica.  Patient underwent orthopedic consultation with Shell Sterling PA-C on 1/18/2022 was found not to be a surgical candidate for her hip issues at this time. She does have primary osteoarthritis of the hips.  However, most of her pain is in the left lower back leg with into the left leg. She denies groin pain. Patient reports that she underwent lumbar epidural steroid injections in the past, approximately 20 years ago, with Dr. Erika wong with significant pain relief. She reports that she did well until about April of 2021 when she had surgery in her right foot. She had another foot surgery in June 2021. She was not weightbearing until 12/2021, when she noticed recurrent lower back and left lower extremity pain.  MRI of the lumbar spine in February 20, 2022 revealed grade 1 anterolisthesis of L4 on L5. Multilevel degenerative disc disease, facet hypertrophy, ligamentum flavum hypertrophy contributing to moderate to severe degrees of canal and neuroforaminal stenosis from L1-L2 through L5-S1. Left hip x-rays on 1/14/2022 revealed moderate to severe bilateral hip arthrosis with sclerosis and small osteophytes fairly symmetric. Moderate symmetric SI joint degenerative changes. CT scan of the pelvis without contrast on 1/14/2022: Mild hip and SI joint degenerative " changes. Patient has failed to obtain pain relief with conservative measures for more than 3 months including oral analgesics, physical therapy (currently on PT at [facility]), to name a few. Pain has progressed in intensity over the past several months.  Pain Description: Constant lower back pain with intermittent exacerbation, described as aching, burning and throbbing sensation.   Radiation of Pain: The pain radiates into the left SI, left lateral thigh, left lateral calf and into the left foot  Pain intensity today: 5/10  Average pain intensity last week: 6/10  Pain intensity ranges from: 3/10 to 10/10  Aggravating factors: Pain increases with bending, twisting, standing, walking. Patient describes severe neurogenic claudication.  Patient reports limitations and general mobility deficits. Patient uses a walker or wheelchair  Alleviating factors: Pain decreases with sitting down, lying down  Associated Symptoms:   Patient reports numbness and weakness in the lower extremities.   Patient denies any new bladder or bowel problems. She has a history of chronic bladder incontinence   Patient reports difficulties with her balance but denies recent falls.   Pain interferes with ADLs, general activities (ability to walk, stand, transition from different positions), and affects patient's quality of life    Review of previous therapies and additional medical records:  Susan Anderson has already failed the following measures, including:   Conservative Measures: Oral analgesics, opioids, physical therapy   Interventional Measures: None recently, 20 years ago she had LES with Dr James  Surgical Measures: No history of previous cervical spine, lumbar spine or hip surgery   Susan Anderson underwent orthopedic surgical consultation with Shell Sterling PA-C on 1/18/2022 was found not to be a surgical candidate for her hip issues at this time.  Susan Anderson presents with significant comorbidities including Type 2  diabetes mellitus, sleep apnea, peripheral neuropathy, migraines, CHF, CAD, hypertension, gout, hyperlipidemia, mitral valve disease, on aspirin   In terms of current analgesics, Susan Anderson takes: pregabalin, Lidoderm  I have reviewed Nabil Report #[id number] (pregabalin) consistent with medication reconciliation.  SOAPP: Not completed by the patient    PHQ-2 Depression Screening  Little interest or pleasure in doing things? 0-->not at all   Feeling down, depressed, or hopeless? 0-->not at all   PHQ-2 Total Score 0       Global Pain Scale 05-17  2022          Pain 18          Feelings 15          Clinical outcomes 15          Activities 20          GPS Total: 68            The [place] Back Pain Disability Scale   0  Not difficult at all 1  Minimally difficult 2  Somewhat difficult 3  Fairly difficult 4  Very difficult 5  Unable to do   Sleep through the night  1       Turn over in bed    3     Get out of bed     4    Make your bed      5   Put on socks (pantyhose)     4    Ride in a car 0        Sit in a chair for several hours   2      Stand up for 20-30 minutes      5   Climb one flight of stairs      5   Walk a few blocks (200-300 yards)       5   Walk several miles      5   Run one block (about 50 yards)      5   Take food out of the refrigerator      5   Reach up to high shelves      5   Move a chair      5   Pull or push heavy doors      5   Bend over to clean the bathtub      5   Throw a ball      5   Carry two bags of groceries      5   Lift and carry a heavy suitcase      5   Total score 84     Review of Diagnostic Studies:  I have reviewed the images with the patient and used the images and a tridimensional spine model to explain findings. I have reviewed the report, as well.  MRI of the lumbar spine without contrast on February 20, 2022.  The conus ends at L1 and has an unremarkable appearance.  Grade 1 anterolisthesis of L4 on L5.  Axial imaging:  L1-L2: Disc bulge, facet hypertrophy.  No significant  canal or foraminal stenosis  L2-L3: Disc osteophyte formation, advanced facet hypertrophy, ligamentum flavum hypertrophy contributing to severe canal and moderate to severe foraminal stenosis  L3-L4: Disc osteophyte complex formation, facet hypertrophy, ligamentum flavum hypertrophy contributing to moderate to severe canal and foraminal stenosis  L4-L5: Disc osteophyte complex formation, facet hypertrophy, ligamentum flavum hypertrophy contributing to severe canal and foraminal stenosis  L5-S1: Disc bulge with a right paracentral disc protrusion.  Facet hypertrophy.  Moderate to severe canal and foraminal stenosis  Left hip x-rays on 1/14/2022 revealed moderate to severe bilateral hip arthrosis with sclerosis and small osteophytes fairly symmetric.  Moderate symmetric SI joint degenerative changes.  CT scan of the pelvis without contrast on 1/14/2022.  Partially imaged aortoiliac atherosclerotic changes.  No evidence of pelvic fracture.  Mild hip and SI joint degenerative changes.    Review of Systems   HENT: Positive for hearing loss and sinus pressure.    Respiratory: Positive for shortness of breath.    Cardiovascular: Positive for palpitations and leg swelling.   Endocrine: Positive for cold intolerance.   Genitourinary: Positive for frequency and urgency.   Musculoskeletal: Positive for back pain and gait problem.   Neurological: Positive for dizziness and light-headedness.   All other systems reviewed and are negative.        Patient Active Problem List   Diagnosis   • Diabetic foot ulcer ([facility])   • PVD (peripheral vascular disease) ([facility])   • Osteomyelitis ([facility])   • Diabetes mellitus with neuropathy ([facility])   • Essential hypertension   • CKD (chronic kidney disease), stage III ([facility])   • Pyogenic inflammation of bone ([facility])   • Hyperglycemia   • Pneumonia due to infectious organism   • Mitral valve disease   • NICM (nonischemic cardiomyopathy) (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 radiculopathy   • Spondylolisthesis, lumbar region   • Degeneration of lumbar or lumbosacral intervertebral disc   • Gait disturbance   • Physical deconditioning   • Sarcopenia       Past Medical History:   Diagnosis Date   • Arthritis    • Asthma 6/4 - double pneumonia    Currently on inhaler and nebulizer   • Cancer ([facility])     cervical cancer, skin cancer   • CHF (congestive heart failure) ([facility]) June 4, 2021   • Chronic kidney disease Related to diabetes   • Coronary artery disease 6/4/2021 DX for hear failure   • Diabetes mellitus ([facility]) 30 years    Seeing Dr. Lam 1st time Aug 19   • Gout    • Hx of colonoscopy    • Hyperlipidemia Reference current labs x 2-3yrs approx   • Hypertension 30 years   • Migraine    • Mitral valve disease    • Mitral valve disease    • Osteomyelitis ([facility])    • Peripheral neuropathy    • Sleep apnea    • Type 2 diabetes mellitus ([facility])     30 years         Past Surgical History:   Procedure Laterality Date   • ABDOMINAL HYSTERECTOMY W/SALPINGECTOMY     • AMPUTATION  Right great toe, 1st 1/3 metatarsal -Reg 4/14/21   • AORTAGRAM N/A 4/9/2021    Procedure: AORTAGRAM WITH OR WITHOUT RUNOFFS, WITH Co2;  Surgeon: Trey Forrest MD;  Location:  [facility];  Service: Vascular;  Laterality: N/A;   • APPENDECTOMY     • BRAIN TUMOR EXCISION      laser surgery    • CARDIAC CATHETERIZATION N/A 6/21/2021    Procedure: LEFT HEART CATH;  Surgeon: Anjum Ceballos MD;  Location:  [facility];  Service: Cardiology;  Laterality: N/A;   • CATARACT EXTRACTION W/ INTRAOCULAR LENS  IMPLANT, BILATERAL     • CHOLECYSTECTOMY     • EYE SURGERY     • HYSTERECTOMY     • TOE SURGERY     • TRANS METATARSAL AMPUTATION Right 4/14/2021    Procedure: AMPUTATION TRANS METATARSAL  RIGHT GREAT TOE;  Surgeon: Valdez Finney MD;  Location: [facility];  Service: Vascular;  Laterality: Right;         Family History   Problem Relation Age of Onset   • Diabetes Mother         Type II   • Heart attack Father    • Coronary artery disease Father    • Diabetes Father         Type II         Social History     Socioeconomic History   • Marital status:    • Number of children: 1   Tobacco Use   • Smoking status: Never Smoker   • Smokeless tobacco: Never Used   Vaping Use   • Vaping Use: Never used   Substance and Sexual Activity   • Alcohol use: Not Currently     Alcohol/week: 0.0 standard drinks     Comment: Social drinking when not recovering from hospitalization   • Drug use: Never   • Sexual activity: Not Currently     Birth control/protection: None           Current Outpatient Medications:   •  Accu-Chek FastClix Lancets misc, Use TID, Disp: 90 each, Rfl: 10  •  Accu-Chek SmartView test strip, TID daily prn, Disp: , Rfl:   •  allopurinol (ZYLOPRIM) 300 MG tablet, Take 1 tablet by mouth Daily., Disp: 60 tablet, Rfl: 10  •  amLODIPine (NORVASC) 10 MG tablet, Take 1 tablet by mouth Daily., Disp: 30 tablet, Rfl: 6  •  amoxicillin-clavulanate (AUGMENTIN) 875-125 MG per tablet, , Disp: , Rfl:   •  aspirin 81 MG EC tablet, Take 1 tablet by mouth Daily., Disp: , Rfl:   •  atorvastatin (LIPITOR) 80 MG tablet, TAKE 1 TABLET BY MOUTH DAILY., Disp: 30 tablet, Rfl: 0  •  carvedilol (COREG) 12.5 MG tablet, Take 1 tablet by mouth 2 (Two) Times a Day., Disp: 60 tablet, Rfl: 11  •  clotrimazole-betamethasone (LOTRISONE) 1-0.05 % cream, Apply  topically to the appropriate area as directed 2 (Two) Times a Day., Disp: 15 g, Rfl: 2  •  furosemide (LASIX) 40 MG tablet, Take 0.5 tablets by mouth Daily. (Patient taking differently: Take 40 mg by mouth Daily.), Disp: 90 tablet, Rfl:   •  glucose blood test strip, Accu-Chek SmartView Test Strips, Disp: , Rfl:   •  glucose blood test strip, Use as instructed, Disp: 90  "each, Rfl: 10  •  insulin lispro (HumaLOG) 100 UNIT/ML injection, For use at mealtimes and per correctional scale, MDD 50 units, Disp: 20 mL, Rfl: 5  •  Insulin Syringe-Needle U-100 (BD Veo Insulin Syringe U/F) 31G X 15/64\" 0.3 ML misc, Check CBG TID, Disp: 90 each, Rfl: 10  •  Insulin Syringe-Needle U-100 31G X 15/64\" 1 ML misc, BD Veo Insulin Syringe Ultra-Fine 1 mL 31 gauge x 15/64\", Disp: , Rfl:   •  Insulin Syringe/Needle U-500 (BD Insulin Syringe U-500) 31G X 6MM 0.5 ML misc, 3 applicators Daily As Needed (for glucose please adjust to 90 day supply if needed e11.9)., Disp: 300 each, Rfl: 5  •  isosorbide dinitrate (ISORDIL) 20 MG tablet, Take 1 tablet by mouth 2 (Two) Times a Day., Disp: 180 tablet, Rfl: 3  •  Lantus 100 UNIT/ML injection, INJECT 30 UNITS UNDER THE SKIN INTO THE APPROPRIATE AREA AS DIRECTED DAILY., Disp: 10 mL, Rfl: 5  •  pregabalin (Lyrica) 100 MG capsule, 100mg in the morning and 200mg at night, Disp: 9 capsule, Rfl: 0  •  vitamin B-12 (CYANOCOBALAMIN) 100 MCG tablet, Take 1 tablet by mouth Daily., Disp: , Rfl:       Allergies   Allergen Reactions   • Cephalexin Nausea Only   • Erythromycin Base Nausea Only   • Oxycodone Nausea Only   • Sulfa Antibiotics Nausea Only         /70   Pulse 55   Temp 96.8 °F (36 °C)   Ht 160 cm (62.99\")   Wt 93.7 kg (206 lb 9.6 oz)   SpO2 97%   BMI 36.61 kg/m²       Physical Exam:  Constitutional: Patient appears well-developed, well-nourished, well-hydrated  HEENT: Head: Normocephalic and atraumatic  Eyes: Conjunctivae and lids are normal  Pupils: Equal, round, reactive to light  Peripheral vascular exam: Posterior tibialis: right 2+ and left 2+. Dorsalis pedis: right 2+ and left 2+. 1+ edema.   Musculoskeletal   Gait and station: Wheelchair bound. Unable to stand without assistance  Lumbar spine: Passive and active range of motion are limited secondary to pain. Extension, lateral flexion, rotation of the lumbar spine increased and reproduced pain. " Lumbar facet joint loading maneuvers are positive.  Hip joints: The range of motion of the hip joints is limited to flexion and internal/external rotation bilaterally without pain.   Palpation of the bilateral greater trochanter, unrevealing   Examination of the Iliotibial band: unrevealing   Neurological:   Patient is alert and oriented to person, place, and time.   Speech: Normal.   Cortical function: Normal mental status.   Reflex Scores:  Right patellar: 0+  Left patellar: 0+  Right Achilles: 0+  Left Achilles: 0+  Motor strength: 5/5  Motor Tone: Normal  Involuntary movements: None.   Superficial/Primitive Reflexes: Primitive reflexes were absent.   Right Payne: Absent  Left Payne: Absent  Right ankle clonus: Absent  Left ankle clonus: Absent   Babinsky: Absent  Straight leg raising test: Negative.   Sensory exam: Intact to light touch, intact pain and temperature sensation, intact vibration sensation and normal proprioception.   Coordination: Normal finger to nose   Skin and subcutaneous tissue: Skin is warm and intact. No rash noted. No cyanosis.   Psychiatric: Judgment and insight: Normal. Recent and remote memory: Intact. Mood and affect: Normal.     ASSESSMENT:   1. Lumbar stenosis with neurogenic claudication    2. Spondylolisthesis, lumbar region    3. Spondylosis of lumbar region without myelopathy or radiculopathy    4. Degeneration of lumbar or lumbosacral intervertebral disc    5. Sarcopenia    6. Type 1 diabetes mellitus with diabetic neuropathy ([facility])    7. PVD (peripheral vascular disease) ([facility])    8. Physical deconditioning    9. Gait disturbance          PLAN/MEDICAL DECISION MAKING:  Ms. Susan Anderson, 82 y.o. female  presents with a 20-year history of progressive lower back pain, which began without incident. Patient complains of severe lower back pain that radiates into the left lower extremity and into the left foot. She describes severe neurogenic claudication. Patient uses a walker for  short distances otherwise, she uses a wheelchair. She also has a history of peripheral neuropathy and is on Lyrica. Patient underwent orthopedic consultation with Shell Sterling PA-C on 1/18/2022 was found not to be a surgical candidate for her hip issues. Patient reports that she underwent lumbar epidural steroid injections in the past, approximately 20 years ago, with Dr. James and with significant pain relief. She reports that she did well until about April of 2021 when she had surgery in her right foot. She had another right foot surgery in June 2021. She was not weightbearing until 12/2021, when she noticed recurrent lower back and left lower extremity pain. MRI of the lumbar spine in February 20, 2022 revealed grade 1 anterolisthesis of L4 on L5. Multilevel degenerative disc disease, facet hypertrophy, ligamentum flavum hypertrophy contributing to moderate to severe degrees of canal and neuroforaminal stenosis from L1-L2 through L5-S1. Evidence of sarcopenia. Left hip x-rays on 1/14/2022 revealed moderate to severe bilateral hip arthrosis with sclerosis and small osteophytes fairly symmetric. CT scan of the pelvis without contrast on 1/14/2022 revealed mild hip and SI joint degenerative changes. Patient has failed to obtain pain relief with conservative measures for more than 3 months including oral analgesics, physical therapy (currently on PT at [facility]), to name a few. Pain has progressed in intensity over the past several months. Patient presents with complex spine problems, with multilevel lumbar spinal canal and NF stenosis, spondylolisthesis as well as significant comorbidities. She has severe deconditioning, sarcopenia, and functional decline. A comprehensive initial evaluation including history and physical exam were performed including pertinent physiologic and functional assessment. Patient presents with intractable pain due to the diagnoses listed above. Patient has failed to respond  to conservative modalities, as referenced under HPI including the impact of patient's moderate-to-severe pain contributing to significant impairment in daily activities, ADLs, and a negative impact on quality of life. Supporting diagnostic studies of patient's chronic pain condition have been reviewed. I have reviewed all available patient's medical records as well as previous therapies as referenced above. I had a lengthy conversation with Ms. Susan Anderson regarding her chronic pain condition and potential therapeutic options including risks, benefits, alternative therapies, to name a few. We have discussed using a stepwise approach starting with the shortest or least intense level of treatment, care, or service as determined by the extent required to diagnose and or treat patient's condition. The treatments proposed are consistent with the patient's medical condition and are known to be as safe and effective by current guidelines and standard of care. There is no evidence of absolute contraindications for the proposed procedures under the current circumstances. These treatments are not considered experimental or investigational. The duration and frequency proposed are considered appropriate for the service in accordance with accepted standards of medical practice for the diagnosis and or treatment of the patient's condition and or intended to improve the patient's level of function. These services will be furnished in a setting appropriate to the patient's medical needs and condition. Therefore, I have proposed the following plan:  1. Interventional pain management measures: Patient presents with multilevel canal and neuroforaminal stenosis of the lumbar spine along with grade 1 spondylolisthesis at L4-L5 and advanced sarcopenia. Patient declined neurosurgical consultation.  We have discussed prospects of epidural steroid injections and rhizotomies for treatment of her chronic pain.  However, the main issue is  lower back and left lower extremity pain with severe neurogenic claudication. Patient declined a trial of epidural steroid injections. We have discussed at length prospects of a spinal cord stimulator trial. She reported interest in SCS and would like to discuss this in more detail with her daughter. Patient will need to stop aspirin for 1 week prior to her trial and remain off of her aspirin throughout the trial. Patient will be scheduled for a spinal cord stimulator trial with [facility]. Patient has received formal education from me including risks, benefits, and alternative treatments, as well as detailed information regarding the procedure and specific goals for the trial. Patient has also received didactic materials including educational booklets and DVDs on spinal cord stimulation therapies.  2. Diagnostic studies:  A. CBC, PT, PTT prior to SCS trial  B. MRI of the thoracic spine without contrast to assess capacity of spinal canal and epidural space prior to SCS trial and implant  3. Pharmacological measures: Reviewed and discussed; Patient takes pregabalin, Lidoderm. Patient has failed pharmacological trials including opiate therapy and has declined pharmacological measures at this time  4. Long-term rehabilitation efforts:  A. The patient does not have a history of falls but has significant risk factors for falls. Fall precautions: Patient has been instructed regarding universal fall precautions, such as;   • Using a gait aid a walker at the appropriate height at all times for ambulation or a wheelchair  • Removing all area rugs and coffee tables to create a safe environment at home  • Ensure clean, dry floors  • Wearing supportive footwear and properly fitting clothing  • Ensure bed/chair is appropriate height and patient's feet can touch the floor  • Using a shower transfer bench  • Using walk-in shower and having shower safety bars installed  • Ensure proper lighting, minimize glare  • Have nightlights  operational and in use  • Participation in an exercise program for gait training, balance training and strength  • Avoid carrying laundry up and down steps  • Ensure proper compliance and organization of medications to avoid errors   • Avoid use of over the counter sedatives and alcohol consumption  • Ensure easy access to call bell, glasses, TV control, telephone  • Ensure glasses/hearing aids are in use or close by (on top of night table)  B. Continue a comprehensive physical therapy program at [facility]  C. If possible, start an exercise program such as water therapy  D. Contrast therapy: Apply ice-packs for 15-20 minutes, followed by heating pads for 15-20 minutes to affected area   E. Referral to Dr. Esdras Zimmerman for psychological screening for spinal cord stimulation and intrathecal therapies.  F. Susan Anderson  reports that she has never smoked. She has never used smokeless tobacco.   5. The patient has been instructed to contact my office with any questions or difficulties. The patient understands the plan and agrees to proceed accordingly.    The patient has a documented plan of care to address chronic pain. Susan Anderson reports a pain score of 5/10.  Given her pain assessment as noted, treatment options were discussed and the following options were decided upon as a follow-up plan to address the patient's pain: continuation of current treatment plan for pain, home exercises and therapy, prescription for non-opiod analgesics, referral to Physical Therapy, steroid injections, use of non-medical modalities (ice, heat, stretching and/or behavior modifications) and Interventional pain management measures.           Pain Management Panel    There is no flowsheet data to display.          AGUSTIN query complete. AGUSTIN reviewed by Kvng Dobson MD.     Pain Medications             aspirin 81 MG EC tablet Take 1 tablet by mouth Daily.    pregabalin (Lyrica) 100 MG capsule 100mg in the morning and 200mg at night            I spent 65 minutes face-to-face with the patient, of which more than 50% of the time was spent counseling regarding evaluation, diagnosis, prognosis, diagnostic testing, potential referrals, treatment options for chronic pain condition and overall rehabilitation, providing links and reading materials applicable to treatment of current condition, coordination of care with other providers involved in patient's care, long-term management of concurrent comorbidities affecting effective pain control, risk and benefits of different interventions, alternative therapies, a comprehensive plan of care to address physical deconditioning, strategies to prevent fall injuries due to high risk for falls, risks and benefits as it relates to spinal cord stimulator devices for trial and implantation and long-term management and functional goals of spinal cord stimulation     Please note that portions of this note were completed with a voice recognition program.

## 2022-05-12 PROBLEM — M43.16 SPONDYLOLISTHESIS, LUMBAR REGION: Status: ACTIVE | Noted: 2022-05-12

## 2022-05-12 PROBLEM — M48.062 LUMBAR STENOSIS WITH NEUROGENIC CLAUDICATION: Status: ACTIVE | Noted: 2022-05-12

## 2022-05-12 PROBLEM — M47.816 SPONDYLOSIS OF LUMBAR REGION WITHOUT MYELOPATHY OR RADICULOPATHY: Status: ACTIVE | Noted: 2022-05-12

## 2022-05-12 PROBLEM — M51.37 DEGENERATION OF LUMBAR OR LUMBOSACRAL INTERVERTEBRAL DISC: Status: ACTIVE | Noted: 2022-05-12

## 2022-05-13 ENCOUNTER — OFFICE VISIT (OUTPATIENT)
Dept: CARDIOLOGY | Facility: CLINIC | Age: 82
End: 2022-05-13

## 2022-05-13 VITALS
WEIGHT: 202 LBS | BODY MASS INDEX: 35.79 KG/M2 | OXYGEN SATURATION: 94 % | DIASTOLIC BLOOD PRESSURE: 70 MMHG | SYSTOLIC BLOOD PRESSURE: 134 MMHG | HEIGHT: 63 IN | HEART RATE: 54 BPM

## 2022-05-13 DIAGNOSIS — E78.5 DYSLIPIDEMIA: ICD-10-CM

## 2022-05-13 DIAGNOSIS — I42.8 NICM (NONISCHEMIC CARDIOMYOPATHY): Primary | ICD-10-CM

## 2022-05-13 DIAGNOSIS — I05.9 MITRAL VALVE DISEASE: ICD-10-CM

## 2022-05-13 DIAGNOSIS — I25.10 CORONARY ARTERY DISEASE INVOLVING NATIVE CORONARY ARTERY OF NATIVE HEART WITHOUT ANGINA PECTORIS: ICD-10-CM

## 2022-05-13 DIAGNOSIS — I50.42 CHRONIC COMBINED SYSTOLIC AND DIASTOLIC HEART FAILURE: ICD-10-CM

## 2022-05-13 DIAGNOSIS — I10 ESSENTIAL HYPERTENSION: ICD-10-CM

## 2022-05-13 PROCEDURE — 99214 OFFICE O/P EST MOD 30 MIN: CPT | Performed by: INTERNAL MEDICINE

## 2022-05-13 NOTE — PROGRESS NOTES
[facility]    Encounter Date: 2022    Patient ID: Susan Anderson is a 82 y.o. female.  : 1940     PCP: Arleen Doherty MD       Chief Complaint: NICM (nonischemic cardiomyopathy) ([facility])      PROBLEM LIST:  1. Cardiomyopathy/acute on chronic combined systolic/diastolic CHF  a. Echocardiogram, 2021: EF 43%. Global hypokinesis. More pronounced in the anteroseptal wall and apex. Severe mitral annular calcification. Mild to moderate MR. Moderate sized left pleural effusion.  b. [facility], 2021: EF 40-45%. Mild to moderate nonobstructive CAD involving multiple vessels without any evidence of severe or critical disease.  2. Nonobstructive coronary artery disease  a. [facility], 2021:  EF 40-45%. Mild to moderate nonobstructive CAD involving multiple vessels without any evidence of severe or critical disease.  3. Bilateral pleural effusions.  4. Peripheral vascular disease  5. Hypertension   6. CKD, stage III  7. Type II diabetes mellitus   8. Osteomyelitis s/p right great toe amputation    History of Present Illness  Patient presents today for a 6 month follow-up with a history of coronary artery disease, nonischemic cardiomyopathy, mitral valve disease, chronic combined systolic and diastolic heart failure, and cardiac risk factors. Since last visit, patient reports her blood pressure has been running high at home. Before her visit today, she was sitting on the patio when she got overheated. She was provided with a fan, water, and a cold rag on her neck. She did begin to cool down. Her blood pressure was elevated when she followed up with her PCP recently who discontinued her lisinopril possibly due to poor kidney function and initiated amlodipine. She does complain that she is retaining excess fluid, mainly in her abdominal area. Patient denies chest pain, shortness of breath, orthopnea, palpitations, dizziness, and syncope.         Allergies   Allergen  "Reactions   • Cephalexin Nausea Only   • Erythromycin Base Nausea Only   • Oxycodone Nausea Only   • Sulfa Antibiotics Nausea Only         Current Outpatient Medications:   •  Accu-Chek FastClix Lancets misc, Use TID, Disp: 90 each, Rfl: 10  •  Accu-Chek SmartView test strip, TID daily prn, Disp: , Rfl:   •  allopurinol (ZYLOPRIM) 300 MG tablet, Take 1 tablet by mouth Daily., Disp: 60 tablet, Rfl: 10  •  amLODIPine (NORVASC) 5 MG tablet, Take 1 tablet by mouth Daily., Disp: 30 tablet, Rfl: 5  •  amoxicillin-clavulanate (AUGMENTIN) 875-125 MG per tablet, , Disp: , Rfl:   •  aspirin 81 MG EC tablet, Take 1 tablet by mouth Daily., Disp: , Rfl:   •  atorvastatin (LIPITOR) 80 MG tablet, TAKE 1 TABLET BY MOUTH DAILY., Disp: 30 tablet, Rfl: 0  •  carvedilol (COREG) 12.5 MG tablet, Take 1 tablet by mouth 2 (Two) Times a Day., Disp: 60 tablet, Rfl: 11  •  clotrimazole-betamethasone (LOTRISONE) 1-0.05 % cream, Apply  topically to the appropriate area as directed 2 (Two) Times a Day., Disp: 15 g, Rfl: 2  •  furosemide (LASIX) 40 MG tablet, Take 0.5 tablets by mouth Daily. (Patient taking differently: Take 40 mg by mouth Daily.), Disp: 90 tablet, Rfl:   •  glucose blood test strip, Accu-Chek SmartView Test Strips, Disp: , Rfl:   •  glucose blood test strip, Use as instructed, Disp: 90 each, Rfl: 10  •  insulin lispro (HumaLOG) 100 UNIT/ML injection, For use at mealtimes and per correctional scale, MDD 50 units, Disp: 20 mL, Rfl: 5  •  Insulin Syringe-Needle U-100 (BD Veo Insulin Syringe U/F) 31G X 15/64\" 0.3 ML misc, Check CBG TID, Disp: 90 each, Rfl: 10  •  Insulin Syringe-Needle U-100 31G X 15/64\" 1 ML misc, BD Veo Insulin Syringe Ultra-Fine 1 mL 31 gauge x 15/64\", Disp: , Rfl:   •  Insulin Syringe/Needle U-500 (BD Insulin Syringe U-500) 31G X 6MM 0.5 ML misc, 3 applicators Daily As Needed (for glucose please adjust to 90 day supply if needed e11.9)., Disp: 300 each, Rfl: 5  •  isosorbide dinitrate (ISORDIL) 20 MG tablet, " "Take 1 tablet by mouth 2 (Two) Times a Day., Disp: 180 tablet, Rfl: 3  •  Lantus 100 UNIT/ML injection, INJECT 30 UNITS UNDER THE SKIN INTO THE APPROPRIATE AREA AS DIRECTED DAILY., Disp: 10 mL, Rfl: 5  •  pregabalin (Lyrica) 100 MG capsule, 100mg in the morning and 200mg at night, Disp: 9 capsule, Rfl: 0  •  vitamin B-12 (CYANOCOBALAMIN) 100 MCG tablet, Take 1 tablet by mouth Daily., Disp: , Rfl:     The following portions of the patient's history were reviewed and updated as appropriate: allergies, current medications, past family history, past medical history, past social history, past surgical history and problem list.    ROS  Review of Systems   Constitution: Negative for chills, fever, fatigue, generalized weakness.   Cardiovascular: Negative for chest pain, dyspnea on exertion, leg swelling, palpitations, orthopnea, and syncope.   Respiratory: Negative for cough, shortness of breath, and wheezing.  HENT: Negative for ear pain, nosebleeds, and tinnitus.  Gastrointestinal: Negative for abdominal pain, constipation, diarrhea, nausea and vomiting.   Genitourinary: No urinary symptoms.  Musculoskeletal: Negative for muscle cramps.  Neurological: Negative for dizziness, headaches, loss of balance, numbness, and symptoms of stroke.  Psychiatric: Normal mental status.     All other systems reviewed and are negative.        Objective:     /70 (BP Location: Left arm, Patient Position: Sitting)   Pulse 54   Ht 160 cm (63\")   Wt 91.6 kg (202 lb)   SpO2 94%   BMI 35.78 kg/m²      Physical Exam  Constitutional: Patient appears well-developed and well-nourished.   HENT: HEENT exam unremarkable.   Neck: Neck supple. No JVD present. No carotid bruits.   Cardiovascular: Normal rate, regular rhythm and normal heart sounds. No murmur heard.   2+ symmetric pulses.   Pulmonary/Chest: Breath sounds normal. Does not exhibit tenderness.   Abdominal: Abdomen benign.   Musculoskeletal:  Trace edema  Neurological: Neurological " exam unremarkable.   Vitals reviewed.    Data Review:   Lab Results   Component Value Date    GLUCOSE 368 (H) 02/25/2022    BUN 54 (H) 02/25/2022    CREATININE 1.24 (H) 02/25/2022    EGFRIFNONA 41 (L) 02/25/2022    BCR 43.5 (H) 02/25/2022    K 4.5 02/25/2022    CO2 23.5 02/25/2022    CALCIUM 8.7 02/25/2022    ALBUMIN 3.40 (L) 02/25/2022    AST 26 02/25/2022    ALT 30 02/25/2022     Lab Results   Component Value Date    CHOL 186 06/04/2021    TRIG 103 06/04/2021    HDL 40 06/04/2021     (H) 06/04/2021      Lab Results   Component Value Date    WBC 4.91 02/03/2022    RBC 3.39 (L) 02/03/2022    HGB 10.3 (L) 02/03/2022    HCT 31.0 (L) 02/03/2022    MCV 91.4 02/03/2022     02/03/2022     Lab Results   Component Value Date    TSH 2.970 11/04/2021     Lab Results   Component Value Date    HGBA1C 9.60 (H) 02/25/2022        Procedures             Assessment:      Diagnosis Plan   1. NICM (nonischemic cardiomyopathy) (HCC)  Stable. Continue on Coreg 12.5, Isordil 20 mg, and Lasix 40 mg.    2. Mitral valve disease  Stable and asymptomatic.    3. Coronary artery disease involving native coronary artery of native heart without angina pectoris  Stable without angina on current activity. Continue on aspirin 81 mg.    4. Chronic combined systolic and diastolic heart failure (HCC)  Stable and asymptomatic.    5. Essential hypertension  Blood pressure readings at home have been slightly elevated. Continue on amlodipine 5 mg.    6. Dyslipidemia  Acceptable control. Continue on atorvastatin 80 mg.      Plan:   Overall stable from cardiac standpoint, well compensated CHF.    Blood pressure is within normal limits.    Recommended use of support stockings, elevation of legs, and avoidance of salt/sodium.  Continue current medications.   FU in 6 MO, sooner as needed.  Thank you for allowing us to participate in the care of your patient.     Scribed for Anjum Ceballos MD by Isabel Coley. 5/13/2022 14:25 EDT    I, Anjum Ceballos,  MD, personally performed the services described in this documentation as scribed by the above named individual in my presence, and it is both accurate and complete.  5/13/2022  14:39 EDT        Please note that portions of this note may have been completed with a voice recognition program. Efforts were made to edit the dictations, but occasionally words are mistranscribed.

## 2022-05-16 ENCOUNTER — OFFICE VISIT (OUTPATIENT)
Dept: INTERNAL MEDICINE | Facility: CLINIC | Age: 82
End: 2022-05-16

## 2022-05-16 VITALS
TEMPERATURE: 97.5 F | OXYGEN SATURATION: 96 % | DIASTOLIC BLOOD PRESSURE: 88 MMHG | HEART RATE: 53 BPM | SYSTOLIC BLOOD PRESSURE: 142 MMHG | BODY MASS INDEX: 35.78 KG/M2 | HEIGHT: 63 IN

## 2022-05-16 DIAGNOSIS — I50.42 CHRONIC COMBINED SYSTOLIC AND DIASTOLIC HEART FAILURE: ICD-10-CM

## 2022-05-16 DIAGNOSIS — R53.83 FATIGUE, UNSPECIFIED TYPE: ICD-10-CM

## 2022-05-16 DIAGNOSIS — I10 ESSENTIAL HYPERTENSION: Primary | ICD-10-CM

## 2022-05-16 DIAGNOSIS — E11.65 TYPE 2 DIABETES MELLITUS WITH HYPERGLYCEMIA, WITH LONG-TERM CURRENT USE OF INSULIN: ICD-10-CM

## 2022-05-16 DIAGNOSIS — Z79.4 TYPE 2 DIABETES MELLITUS WITH HYPERGLYCEMIA, WITH LONG-TERM CURRENT USE OF INSULIN: ICD-10-CM

## 2022-05-16 PROCEDURE — 99214 OFFICE O/P EST MOD 30 MIN: CPT | Performed by: INTERNAL MEDICINE

## 2022-05-16 RX ORDER — AMLODIPINE BESYLATE 10 MG/1
10 TABLET ORAL DAILY
Qty: 30 TABLET | Refills: 6 | Status: SHIPPED | OUTPATIENT
Start: 2022-05-16 | End: 2022-06-01 | Stop reason: ALTCHOICE

## 2022-05-16 NOTE — PROGRESS NOTES
Office Note      Date: 2022  Patient Name: Susan Anderson  MRN: [id number]  : 1940    Chief Complaint   Patient presents with   • Follow-up     2wk recheck       History of Present Illness: Susan Anderson is a 82 y.o. female who presents for Follow-up (2wk recheck).     Pt to come back on  for a1c. On humalog 50 units w/ SS.   Still having polyuria. Saw cardio who state she is at her dry weight. On lasix. Last visit she was taken off hydralazine and start on norvasc.  Has fatigue, snores at night.      Subjective      Review of Systems:   Pertinent review of systems per HPI.    Review of Systems   Constitutional: Positive for fatigue. Negative for activity change, appetite change, chills, diaphoresis, fever and unexpected weight change.   HENT: Negative for congestion, dental problem, drooling, ear discharge, ear pain, facial swelling, hearing loss and mouth sores.    Eyes: Negative for pain, discharge and itching.   Respiratory: Negative for apnea, cough, choking, chest tightness and shortness of breath.    Cardiovascular: Positive for leg swelling. Negative for chest pain and palpitations.   Gastrointestinal: Negative for abdominal distention, abdominal pain, blood in stool, constipation and diarrhea.   Endocrine: Negative for cold intolerance, heat intolerance, polydipsia and polyuria.   Genitourinary: Negative for difficulty urinating, dysuria, frequency and hematuria.   Skin: Negative for color change, pallor, rash and wound.   Allergic/Immunologic: Negative for environmental allergies, food allergies and immunocompromised state.   Neurological: Negative for dizziness, weakness and light-headedness.   Psychiatric/Behavioral: Negative for agitation, behavioral problems, confusion, decreased concentration and self-injury. The patient is not nervous/anxious.    All other systems reviewed and are negative.    Allergies   Allergen Reactions   • Cephalexin Nausea Only   • Erythromycin Base  "Nausea Only   • Oxycodone Nausea Only   • Sulfa Antibiotics Nausea Only       Objective     Physical Exam:  Vital Signs:   Vitals:    05/16/22 1330   BP: 142/88   Pulse: 53   Temp: 97.5 °F (36.4 °C)   TempSrc: Temporal   SpO2: 96%   Weight: Comment: wheelchair   Height: 160 cm (63\")      Body mass index is 35.78 kg/m².    Physical Exam  Vitals and nursing note reviewed.   Constitutional:       General: She is not in acute distress.     Appearance: She is well-developed.   HENT:      Head: Normocephalic and atraumatic.      Right Ear: External ear normal.      Left Ear: External ear normal.   Eyes:      General: No scleral icterus.        Right eye: No discharge.         Left eye: No discharge.      Conjunctiva/sclera: Conjunctivae normal.   Cardiovascular:      Rate and Rhythm: Normal rate and regular rhythm.      Heart sounds: Normal heart sounds. No murmur heard.    No friction rub. No gallop.   Pulmonary:      Effort: Pulmonary effort is normal. No respiratory distress.      Breath sounds: Normal breath sounds. No wheezing or rales.   Musculoskeletal:      Right lower leg: Edema present.      Left lower leg: Edema present.   Skin:     General: Skin is warm and dry.      Coloration: Skin is not pale.         Assessment / Plan      Assessment & Plan:    1. Essential hypertension  BP elevated, inc norvasc to 10mg.   - amLODIPine (NORVASC) 10 MG tablet; Take 1 tablet by mouth Daily.  Dispense: 30 tablet; Refill: 6  - Comprehensive Metabolic Panel; Future    2. Chronic combined systolic and diastolic heart failure (HCC)  Stable, at dry weight. Compression socks for leg swelling.    3. Type 2 diabetes mellitus with hyperglycemia, with long-term current use of insulin (HCC)  Repeat a1c. Discussed uncontrolled DM as cause of polyuria in addition to lasxi.  - Hemoglobin A1c; Future    4. Fatigue, unspecified type  Likely due to ELIUD. She is not interested in sleep med referral at this time bc she says she will not wear a " cpap machine.       Arleen Doherty MD  05/16/2022

## 2022-05-17 ENCOUNTER — OFFICE VISIT (OUTPATIENT)
Dept: PAIN MEDICINE | Facility: CLINIC | Age: 82
End: 2022-05-17

## 2022-05-17 VITALS
HEIGHT: 63 IN | DIASTOLIC BLOOD PRESSURE: 70 MMHG | SYSTOLIC BLOOD PRESSURE: 146 MMHG | HEART RATE: 55 BPM | BODY MASS INDEX: 36.61 KG/M2 | WEIGHT: 206.6 LBS | OXYGEN SATURATION: 97 % | TEMPERATURE: 96.8 F

## 2022-05-17 DIAGNOSIS — M48.062 LUMBAR STENOSIS WITH NEUROGENIC CLAUDICATION: ICD-10-CM

## 2022-05-17 DIAGNOSIS — M62.84 SARCOPENIA: ICD-10-CM

## 2022-05-17 DIAGNOSIS — M51.37 DEGENERATION OF LUMBAR OR LUMBOSACRAL INTERVERTEBRAL DISC: ICD-10-CM

## 2022-05-17 DIAGNOSIS — Z00.8 PRE-SURGICAL PSYCHOLOGICAL ASSESSMENT, ENCOUNTER FOR: ICD-10-CM

## 2022-05-17 DIAGNOSIS — R26.9 GAIT DISTURBANCE: ICD-10-CM

## 2022-05-17 DIAGNOSIS — R79.1 ABNORMAL COAGULATION PROFILE: ICD-10-CM

## 2022-05-17 DIAGNOSIS — M47.816 SPONDYLOSIS OF LUMBAR REGION WITHOUT MYELOPATHY OR RADICULOPATHY: ICD-10-CM

## 2022-05-17 DIAGNOSIS — Z01.818 PREOPERATIVE EXAMINATION: ICD-10-CM

## 2022-05-17 DIAGNOSIS — M48.062 LUMBAR STENOSIS WITH NEUROGENIC CLAUDICATION: Primary | ICD-10-CM

## 2022-05-17 DIAGNOSIS — Z01.812 PRE-PROCEDURAL LABORATORY EXAMINATIONS: ICD-10-CM

## 2022-05-17 DIAGNOSIS — R53.81 PHYSICAL DECONDITIONING: ICD-10-CM

## 2022-05-17 DIAGNOSIS — E10.40 TYPE 1 DIABETES MELLITUS WITH DIABETIC NEUROPATHY: ICD-10-CM

## 2022-05-17 DIAGNOSIS — M43.16 SPONDYLOLISTHESIS, LUMBAR REGION: ICD-10-CM

## 2022-05-17 DIAGNOSIS — I73.9 PVD (PERIPHERAL VASCULAR DISEASE): ICD-10-CM

## 2022-05-17 PROCEDURE — 99205 OFFICE O/P NEW HI 60 MIN: CPT | Performed by: ANESTHESIOLOGY

## 2022-05-18 ENCOUNTER — TELEPHONE (OUTPATIENT)
Dept: INTERNAL MEDICINE | Facility: CLINIC | Age: 82
End: 2022-05-18

## 2022-05-18 ENCOUNTER — TELEPHONE (OUTPATIENT)
Dept: PAIN MEDICINE | Facility: CLINIC | Age: 82
End: 2022-05-18

## 2022-05-18 NOTE — TELEPHONE ENCOUNTER
Caller: PATIENT     Relationship to patient: SELF     Best call back number: [phone]    Patient is needing: PATIENT CALLED STATING THAT SHE DOES NOT WANT TO HAVE ANY FURTHER TESTING AT THIS TIME AND WILL CALL BACK IF SHE DECIDES TO PURSUE THAT.

## 2022-05-18 NOTE — TELEPHONE ENCOUNTER
Pharmacy Name: [facility]. - [place], [place] - [address] - [phone]  - [phone]      Pharmacy representative name: CARA    Pharmacy representative phone number:     What medication are you calling in regards to: AMLODIPINE     What question does the pharmacy have: THE PHARMACY IS WANTING TO KNOW IF ANY OTHER MEDICATIONS HAVE BEEN CEASED BECAUSE OF THIS INCREASE.     Who is the provider that prescribed the medication: DR HO.     Additional notes:

## 2022-06-01 ENCOUNTER — OFFICE VISIT (OUTPATIENT)
Dept: CARDIOLOGY | Facility: HOSPITAL | Age: 82
End: 2022-06-01

## 2022-06-01 VITALS
BODY MASS INDEX: 42.52 KG/M2 | SYSTOLIC BLOOD PRESSURE: 167 MMHG | RESPIRATION RATE: 18 BRPM | HEART RATE: 80 BPM | OXYGEN SATURATION: 93 % | DIASTOLIC BLOOD PRESSURE: 73 MMHG | WEIGHT: 240 LBS | TEMPERATURE: 96.7 F | HEIGHT: 63 IN

## 2022-06-01 DIAGNOSIS — I50.43 ACUTE ON CHRONIC COMBINED SYSTOLIC AND DIASTOLIC CHF (CONGESTIVE HEART FAILURE): Primary | ICD-10-CM

## 2022-06-01 DIAGNOSIS — I10 ESSENTIAL HYPERTENSION: ICD-10-CM

## 2022-06-01 DIAGNOSIS — I05.9 MITRAL VALVE DISEASE: ICD-10-CM

## 2022-06-01 LAB
ANION GAP SERPL CALCULATED.3IONS-SCNC: 12.8 MMOL/L (ref 5–15)
BUN SERPL-MCNC: 68 MG/DL (ref 8–23)
BUN/CREAT SERPL: 47.9 (ref 7–25)
CALCIUM SPEC-SCNC: 8.5 MG/DL (ref 8.6–10.5)
CHLORIDE SERPL-SCNC: 104 MMOL/L (ref 98–107)
CO2 SERPL-SCNC: 25.2 MMOL/L (ref 22–29)
CREAT SERPL-MCNC: 1.42 MG/DL (ref 0.57–1)
EGFRCR SERPLBLD CKD-EPI 2021: 36.8 ML/MIN/1.73
GLUCOSE SERPL-MCNC: 230 MG/DL (ref 65–99)
NT-PROBNP SERPL-MCNC: 1856 PG/ML (ref 0–1800)
POTASSIUM SERPL-SCNC: 4 MMOL/L (ref 3.5–5.2)
SODIUM SERPL-SCNC: 142 MMOL/L (ref 136–145)

## 2022-06-01 PROCEDURE — 83880 ASSAY OF NATRIURETIC PEPTIDE: CPT | Performed by: NURSE PRACTITIONER

## 2022-06-01 PROCEDURE — 99214 OFFICE O/P EST MOD 30 MIN: CPT | Performed by: NURSE PRACTITIONER

## 2022-06-01 PROCEDURE — 80048 BASIC METABOLIC PNL TOTAL CA: CPT | Performed by: NURSE PRACTITIONER

## 2022-06-01 RX ORDER — BUMETANIDE 1 MG/1
1 TABLET ORAL 2 TIMES DAILY
Qty: 60 TABLET | Refills: 1 | Status: SHIPPED | OUTPATIENT
Start: 2022-06-01 | End: 2022-06-09 | Stop reason: SDUPTHER

## 2022-06-02 ENCOUNTER — TELEPHONE (OUTPATIENT)
Dept: CARDIOLOGY | Facility: HOSPITAL | Age: 82
End: 2022-06-02

## 2022-06-02 NOTE — TELEPHONE ENCOUNTER
Results for orders placed or performed in visit on 06/01/22   Basic Metabolic Panel    Specimen: Blood   Result Value Ref Range    Glucose 230 (H) 65 - 99 mg/dL    BUN 68 (H) 8 - 23 mg/dL    Creatinine 1.42 (H) 0.57 - 1.00 mg/dL    Sodium 142 136 - 145 mmol/L    Potassium 4.0 3.5 - 5.2 mmol/L    Chloride 104 98 - 107 mmol/L    CO2 25.2 22.0 - 29.0 mmol/L    Calcium 8.5 (L) 8.6 - 10.5 mg/dL    BUN/Creatinine Ratio 47.9 (H) 7.0 - 25.0    Anion Gap 12.8 5.0 - 15.0 mmol/L    eGFR 36.8 (L) >60.0 mL/min/1.73   proBNP    Specimen: Blood   Result Value Ref Range    proBNP 1,856.0 (H) 0.0 - 1,800.0 pg/mL     Called patient to see how she felt after IV diuresis.  She reports that she feels the same.  She is unable to stand on scale so is not sure if she lost any weight.  She does not feel like she urinated much more than usual.  She continues to have abdominal distention and lower extremity swelling.  She does report that she did not start Bumex yet because she just got it from the pharmacy.  Advised her to go ahead and start Bumex but did take 2 mg twice a day for the next 3 days.  If no improvement in symptoms I advised her to call me and I will see her in office and give her another dose of IV lasix.

## 2022-06-07 NOTE — PROGRESS NOTES
"[facility]  [facility]    Chief Complaint  Congestive Heart Failure, Follow-up, and Edema    Subjective    History of Present Illness {CC  Problem List  Visit  Diagnosis   Encounters  Notes  Medications  Labs  Result Review Imaging  Media :23}     Susan Anderson is a 82 y.o. 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  At her last visit she was IV diuresed with 80 mg of Lasix.  Her p.o. Lasix was changed to Bumex and she was advised to take 2 mg twice daily for 3 days and then decrease to 1 mg twice a day.She actually has been taking 2mg BID for 5 days. She notes mild improvement in her dyspnea. She says she has lost 13lbs, she says our weight last week was put in wrong (we had her at 240lbs). She continues to have orthopnea/PND. She reports that she is urinating excessively    She reports that she weight 180 before she was diagnosed with HF last year      Objective     Vital Signs:   Vitals:    06/08/22 1356   BP: 166/72   BP Location: Left arm   Patient Position: Sitting   Cuff Size: Adult   Pulse: 51   Resp: 19   Temp: 96.9 °F (36.1 °C)   TempSrc: Temporal   SpO2: 96%   Weight: 92.5 kg (204 lb)   Height: 160 cm (63\")     Body mass index is 36.14 kg/m².  Physical Exam  Vitals reviewed.   Constitutional:       Appearance: Normal appearance.   HENT:      Head: Normocephalic.   Neck:      Vascular: JVD present. No carotid bruit.   Cardiovascular:      Rate and Rhythm: Regular rhythm. Bradycardia present.      Pulses: Normal pulses.      Heart sounds: Normal heart sounds, S1 normal and S2 normal. No murmur heard.  Pulmonary:      Effort: Pulmonary effort is normal. No respiratory distress.      Breath sounds: Rales present.   Chest:      Chest wall: No " tenderness.   Abdominal:      General: Abdomen is flat. There is distension.      Palpations: Abdomen is soft.   Musculoskeletal:      Cervical back: Neck supple.      Right lower leg: 3+ Edema present.      Left lower leg: 3+ Edema present.   Skin:     General: Skin is warm and dry.   Neurological:      General: No focal deficit present.      Mental Status: She is alert and oriented to person, place, and time. Mental status is at baseline.   Psychiatric:         Mood and Affect: Mood normal.         Behavior: Behavior normal.         Thought Content: Thought content normal.              Result Review  Data Reviewed:{ Labs  Result Review  Imaging  Med Tab  Media :23}     Cardiac Catheterization/Vascular Study (06/21/2021 09:22)  Adult Transthoracic Echo Complete W/ Cont if Necessary Per Protocol (06/04/2021 16:21)  proBNP (06/01/2022 12:02)  Basic Metabolic Panel (06/01/2022 12:02)               Assessment and Plan {CC Problem List  Visit Diagnosis  ROS  Review (Popup)  Health Maintenance  Quality  BestPractice  Medications  SmartSets  SnapShot Encounters  Media :23}   1. Acute on chronic combined systolic and diastolic CHF (congestive heart failure) (HCC) EF 40-45%  She has lost 13 pounds as last week but still appears quite fluid overloaded.  We will check her renal function today.  If it is stable will consider a few days of metolazone versus bring her back in for IV diuresis  Due to history of pleural effusions and ongoing fluid overload we will check chest x-ray  Continue Bumex 2 mg twice daily as long as labs are stable  We will also repeat echo to make sure she is not having any worsening LV systolic dysfunction or valvular heart disease  - Basic Metabolic Panel  - proBNP    2. Hx of bilateral pleural effusion s/p chest tubes  Chest Xray today    3. Stage III CKD  Baseline creatinine appears to be around 1.2, last creatinine slightly elevated at 1.4. Hopefully it was cardiorenal.  Repeat labs  today    4. Mitral valve disease  Severe mitral annular calcification, mild to moderate mitral valve regurgitation  Recheck echo    4. Essential hypertension  Elevated today, but still fluid overloaded.    Advised to start monitoring at home and bring readings to follow up.  Will not change her medications at this time since we need some blood pressure room to allow for further diuresis          Follow Up {Instructions Charge Capture  Follow-up Communications :23}   Return in about 1 week (around 6/15/2022) for Office follow up, HF.    Patient was given instructions and counseling regarding her condition or for health maintenance advice. Please see specific information pulled into the AVS if appropriate.  Advised to call the [facility] with any questions, concerns, or worsening symptoms.

## 2022-06-08 ENCOUNTER — HOSPITAL ENCOUNTER (OUTPATIENT)
Dept: GENERAL RADIOLOGY | Facility: HOSPITAL | Age: 82
Discharge: HOME | End: 2022-06-08

## 2022-06-08 ENCOUNTER — OFFICE VISIT (OUTPATIENT)
Dept: CARDIOLOGY | Facility: HOSPITAL | Age: 82
End: 2022-06-08

## 2022-06-08 VITALS
WEIGHT: 204 LBS | OXYGEN SATURATION: 96 % | HEART RATE: 51 BPM | RESPIRATION RATE: 19 BRPM | TEMPERATURE: 96.9 F | BODY MASS INDEX: 36.14 KG/M2 | SYSTOLIC BLOOD PRESSURE: 166 MMHG | DIASTOLIC BLOOD PRESSURE: 72 MMHG | HEIGHT: 63 IN

## 2022-06-08 DIAGNOSIS — Z87.09 HISTORY OF PLEURAL EFFUSION: ICD-10-CM

## 2022-06-08 DIAGNOSIS — I50.43 ACUTE ON CHRONIC COMBINED SYSTOLIC AND DIASTOLIC CHF (CONGESTIVE HEART FAILURE): ICD-10-CM

## 2022-06-08 DIAGNOSIS — I05.9 MITRAL VALVE DISEASE: ICD-10-CM

## 2022-06-08 DIAGNOSIS — I50.43 ACUTE ON CHRONIC COMBINED SYSTOLIC AND DIASTOLIC CHF (CONGESTIVE HEART FAILURE): Primary | ICD-10-CM

## 2022-06-08 DIAGNOSIS — I10 ESSENTIAL HYPERTENSION: ICD-10-CM

## 2022-06-08 DIAGNOSIS — N18.32 STAGE 3B CHRONIC KIDNEY DISEASE: ICD-10-CM

## 2022-06-08 LAB
ANION GAP SERPL CALCULATED.3IONS-SCNC: 10.9 MMOL/L (ref 5–15)
BUN SERPL-MCNC: 60 MG/DL (ref 8–23)
BUN/CREAT SERPL: 43.8 (ref 7–25)
CALCIUM SPEC-SCNC: 8.9 MG/DL (ref 8.6–10.5)
CHLORIDE SERPL-SCNC: 98 MMOL/L (ref 98–107)
CO2 SERPL-SCNC: 32.1 MMOL/L (ref 22–29)
CREAT SERPL-MCNC: 1.37 MG/DL (ref 0.57–1)
EGFRCR SERPLBLD CKD-EPI 2021: 38.4 ML/MIN/1.73
GLUCOSE SERPL-MCNC: 176 MG/DL (ref 65–99)
NT-PROBNP SERPL-MCNC: 2786 PG/ML (ref 0–1800)
POTASSIUM SERPL-SCNC: 3.7 MMOL/L (ref 3.5–5.2)
SODIUM SERPL-SCNC: 141 MMOL/L (ref 136–145)

## 2022-06-08 PROCEDURE — 80048 BASIC METABOLIC PNL TOTAL CA: CPT | Performed by: NURSE PRACTITIONER

## 2022-06-08 PROCEDURE — 71046 X-RAY EXAM CHEST 2 VIEWS: CPT

## 2022-06-08 PROCEDURE — 99214 OFFICE O/P EST MOD 30 MIN: CPT | Performed by: NURSE PRACTITIONER

## 2022-06-08 PROCEDURE — 83880 ASSAY OF NATRIURETIC PEPTIDE: CPT | Performed by: NURSE PRACTITIONER

## 2022-06-08 NOTE — PATIENT INSTRUCTIONS
I will call you with chest x ray and lab results  Please check your blood pressure and record readings and bring to next office visit

## 2022-06-09 ENCOUNTER — HOSPITAL ENCOUNTER (OUTPATIENT)
Dept: CARDIOLOGY | Facility: HOSPITAL | Age: 82
Discharge: HOME | End: 2022-06-09
Admitting: NURSE PRACTITIONER

## 2022-06-09 ENCOUNTER — TELEPHONE (OUTPATIENT)
Dept: CARDIOLOGY | Facility: HOSPITAL | Age: 82
End: 2022-06-09

## 2022-06-09 VITALS
HEIGHT: 63 IN | BODY MASS INDEX: 36.14 KG/M2 | WEIGHT: 204 LBS | DIASTOLIC BLOOD PRESSURE: 78 MMHG | SYSTOLIC BLOOD PRESSURE: 128 MMHG

## 2022-06-09 DIAGNOSIS — I05.9 MITRAL VALVE DISEASE: ICD-10-CM

## 2022-06-09 DIAGNOSIS — I50.43 ACUTE ON CHRONIC COMBINED SYSTOLIC AND DIASTOLIC CHF (CONGESTIVE HEART FAILURE): ICD-10-CM

## 2022-06-09 DIAGNOSIS — I50.43 ACUTE ON CHRONIC COMBINED SYSTOLIC AND DIASTOLIC CHF (CONGESTIVE HEART FAILURE): Primary | ICD-10-CM

## 2022-06-09 PROCEDURE — 93306 TTE W/DOPPLER COMPLETE: CPT | Performed by: INTERNAL MEDICINE

## 2022-06-09 PROCEDURE — 93306 TTE W/DOPPLER COMPLETE: CPT

## 2022-06-09 RX ORDER — BUMETANIDE 2 MG/1
2 TABLET ORAL 2 TIMES DAILY
Qty: 60 TABLET | Refills: 1 | Status: SHIPPED | OUTPATIENT
Start: 2022-06-09 | End: 2022-06-15 | Stop reason: SDUPTHER

## 2022-06-09 RX ORDER — POTASSIUM CHLORIDE 750 MG/1
20 TABLET, FILM COATED, EXTENDED RELEASE ORAL DAILY
Qty: 60 TABLET | Refills: 1 | Status: SHIPPED | OUTPATIENT
Start: 2022-06-09 | End: 2022-06-15 | Stop reason: SDUPTHER

## 2022-06-09 RX ORDER — METOLAZONE 2.5 MG/1
2.5 TABLET ORAL DAILY
Qty: 3 TABLET | Refills: 0 | Status: SHIPPED | OUTPATIENT
Start: 2022-06-09 | End: 2022-06-15

## 2022-06-09 NOTE — TELEPHONE ENCOUNTER
Called patient with CXR and labs results. CXR showed minimal pulmonary edema and trace bilateral pleural effusions.  Labs stable.  We will start her on metolazone 2.5 mg for 3 days with supplemental potassium.  Continue Bumex 2 mg twice daily.  Patient to monitor blood pressure and bring readings to office.  She tells me this morning before her meds her systolic blood pressure was up to 180.  Hopefully with diuresis this will improve, otherwise may consider Entresto if labs stable after diuresis.  Patient have repeat labs next week prior to office visit.  She verbalized understanding with no further questions or concerns

## 2022-06-10 ENCOUNTER — TELEPHONE (OUTPATIENT)
Dept: CARDIOLOGY | Facility: HOSPITAL | Age: 82
End: 2022-06-10

## 2022-06-10 LAB
BH CV ECHO MEAS - AO MAX PG: 5.4 MMHG
BH CV ECHO MEAS - AO MEAN PG: 3 MMHG
BH CV ECHO MEAS - AO ROOT DIAM: 3 CM
BH CV ECHO MEAS - AO V2 MAX: 116 CM/SEC
BH CV ECHO MEAS - AO V2 VTI: 33.3 CM
BH CV ECHO MEAS - AVA(I,D): 1.74 CM2
BH CV ECHO MEAS - EDV(CUBED): 125 ML
BH CV ECHO MEAS - EDV(MOD-SP2): 116 ML
BH CV ECHO MEAS - EDV(MOD-SP4): 156 ML
BH CV ECHO MEAS - EF(MOD-BP): 47.1 %
BH CV ECHO MEAS - EF(MOD-SP2): 47.1 %
BH CV ECHO MEAS - EF(MOD-SP4): 46.2 %
BH CV ECHO MEAS - ESV(CUBED): 59.3 ML
BH CV ECHO MEAS - ESV(MOD-SP2): 61.4 ML
BH CV ECHO MEAS - ESV(MOD-SP4): 83.9 ML
BH CV ECHO MEAS - FS: 22 %
BH CV ECHO MEAS - IVS/LVPW: 0.92 CM
BH CV ECHO MEAS - IVSD: 1.1 CM
BH CV ECHO MEAS - LA DIMENSION: 3.9 CM
BH CV ECHO MEAS - LAT PEAK E' VEL: 6.7 CM/SEC
BH CV ECHO MEAS - LV MASS(C)D: 220.3 GRAMS
BH CV ECHO MEAS - LV MAX PG: 1.75 MMHG
BH CV ECHO MEAS - LV MEAN PG: 1 MMHG
BH CV ECHO MEAS - LV V1 MAX: 66.1 CM/SEC
BH CV ECHO MEAS - LV V1 VTI: 18.4 CM
BH CV ECHO MEAS - LVIDD: 5 CM
BH CV ECHO MEAS - LVIDS: 3.9 CM
BH CV ECHO MEAS - LVOT AREA: 3.1 CM2
BH CV ECHO MEAS - LVOT DIAM: 2 CM
BH CV ECHO MEAS - LVPWD: 1.2 CM
BH CV ECHO MEAS - MED PEAK E' VEL: 6.7 CM/SEC
BH CV ECHO MEAS - MV A MAX VEL: 110 CM/SEC
BH CV ECHO MEAS - MV DEC TIME: 0.15 MSEC
BH CV ECHO MEAS - MV E MAX VEL: 69.1 CM/SEC
BH CV ECHO MEAS - MV E/A: 0.63
BH CV ECHO MEAS - MV MAX PG: 4.2 MMHG
BH CV ECHO MEAS - MV MEAN PG: 2 MMHG
BH CV ECHO MEAS - MV V2 VTI: 38.6 CM
BH CV ECHO MEAS - MVA(VTI): 1.5 CM2
BH CV ECHO MEAS - PA ACC TIME: 0.07 SEC
BH CV ECHO MEAS - PA PR(ACCEL): 45.7 MMHG
BH CV ECHO MEAS - PA V2 MAX: 76.2 CM/SEC
BH CV ECHO MEAS - SV(LVOT): 57.8 ML
BH CV ECHO MEAS - SV(MOD-SP2): 54.6 ML
BH CV ECHO MEAS - SV(MOD-SP4): 72.1 ML
BH CV ECHO MEAS - TAPSE (>1.6): 2.5 CM
BH CV ECHO MEASUREMENTS AVERAGE E/E' RATIO: 10.31
BH CV XLRA - RV BASE: 2.8 CM
BH CV XLRA - RV LENGTH: 6.1 CM
BH CV XLRA - RV MID: 1.9 CM
BH CV XLRA - TDI S': 12 CM/SEC
LEFT ATRIUM VOLUME INDEX: 26.7 ML/M2
MAXIMAL PREDICTED HEART RATE: 137 BPM
STRESS TARGET HR: 116 BPM

## 2022-06-10 NOTE — TELEPHONE ENCOUNTER
Called patient with echo results, echo stable compared to prior. She reports that she just got the metolazone and plans to start this tomorrow.  I advised her to call if she has any new or worsening symptoms

## 2022-06-13 RX ORDER — ATORVASTATIN CALCIUM 80 MG/1
80 TABLET, FILM COATED ORAL DAILY
Qty: 30 TABLET | Refills: 0 | Status: ON HOLD | OUTPATIENT
Start: 2022-06-13 | End: 2022-08-12 | Stop reason: SDUPTHER

## 2022-06-14 RX ORDER — INSULIN LISPRO 100 [IU]/ML
INJECTION, SOLUTION INTRAVENOUS; SUBCUTANEOUS
Qty: 20 ML | Refills: 5 | OUTPATIENT
Start: 2022-06-14

## 2022-06-14 NOTE — PROGRESS NOTES
"[facility]  [facility] Valve Center    Chief Complaint  Follow-up and Congestive Heart Failure    Subjective    History of Present Illness {CC  Problem List  Visit  Diagnosis   Encounters  Notes  Medications  Labs  Result Review Imaging  Media :23}     Susan Anderson is a 82 y.o. 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  Patient required IV Lasix in our office a couple of weeks ago.  Her Lasix was also changed to Bumex. Due to persistent volume overload she was started on metolazone for 3 days.  Echo showed LVEF of 46 to 50%, stable from previous.  Chest x-ray showed trace bilateral pleural effusion and minimal pulmonary edema.     She is down 16 pounds according to our scales. Reports that shortness of breath has improved and still has abdominal distention.  Edema has also significantly improved.  She reports that she forgot her blood pressure readings but had systolic blood pressures as low as 119.  She was concerned because her systolic blood pressures usually run in the 160s so she googled it and decided to start eating extra salt. She reports that she drinks very little water. Usually drinks tea and diet Coke        Objective     Vital Signs:   Vitals:    06/15/22 1338   BP: 134/96   BP Location: Left arm   Patient Position: Sitting   Cuff Size: Adult   Pulse: 63   Resp: 21   Temp: 97.6 °F (36.4 °C)   TempSrc: Temporal   SpO2: 92%   Weight: 85.3 kg (188 lb)   Height: 160 cm (63\")     Body mass index is 33.3 kg/m².  Physical Exam  Vitals reviewed.   Constitutional:       Appearance: Normal appearance.   HENT:      Head: Normocephalic.   Neck:      Vascular: No carotid bruit.   Cardiovascular:      Rate and Rhythm: Regular rhythm. Bradycardia present.      Pulses: " Normal pulses.      Heart sounds: Normal heart sounds, S1 normal and S2 normal. No murmur heard.  Pulmonary:      Effort: Pulmonary effort is normal. No respiratory distress.   Chest:      Chest wall: No tenderness.   Abdominal:      General: Abdomen is flat. There is distension.      Palpations: Abdomen is soft.   Musculoskeletal:      Cervical back: Neck supple.      Right lower le+ Edema present.      Left lower le+ Edema present.   Skin:     General: Skin is warm and dry.   Neurological:      General: No focal deficit present.      Mental Status: She is alert and oriented to person, place, and time. Mental status is at baseline.   Psychiatric:         Mood and Affect: Mood normal.         Behavior: Behavior normal.         Thought Content: Thought content normal.              Result Review  Data Reviewed:{ Labs  Result Review  Imaging  Med Tab  Media :23}     Cardiac Catheterization/Vascular Study (2021 09:22)  Adult Transthoracic Echo Complete W/ Cont if Necessary Per Protocol (2021 16:21)    proBNP (2022 12:14)  Basic Metabolic Panel (2022 12:14)    XR Chest PA & Lateral (2022 15:29)  Adult Transthoracic Echo Complete W/ Cont if Necessary Per Protocol (2022 15:32)    proBNP (06/15/2022 13:18)  Basic Metabolic Panel (06/15/2022 13:18)           Assessment and Plan {CC Problem List  Visit Diagnosis  ROS  Review (Popup)  Health Maintenance  Quality  BestPractice  Medications  SmartSets  SnapShot Encounters  Media :23}   1. Acute on chronic combined systolic and diastolic CHF (congestive heart failure) (HCC) EF 45-50%  Appears to be euvolemic, she is down approximately 29 pounds.  She reports that 180 pounds was her baseline prior to her hospitalization last year with CHF.  Unfortunately, labs do show LACEY therefore we will have her hold her Bumex for 2 days and resume at 2 mg daily.  Her last dose of metolazone was on Monday.  She is on no ACE inhibitor  secondary to history of hyperkalemia while in the hospital.  No evidence of hyperkalemia on recent labs.  Could consider the addition of Entresto if renal function improves  Encouraged low-sodium diet  Repeat labs next week    2. LACEY  Baseline creatinine around 1.2-1.4  Hold Bumex and potassium for 2 days.  Resume Bumex at 2 mg daily and potassium at 10 mill equivalents daily  Repeat labs in 1 week.  She reports she has some labs that she has to do for her PCP, plans to go to PCPs office on Thursday    3. Essential hypertension  Appears controlled since diuresis  Advised to continue to monitor blood pressure at home.  Previously was on lisinopril but stopped due to hyperkalemia.  Amlodipine was recently stopped due to edema          Follow Up {Instructions Charge Capture  Follow-up Communications :23}   Return in about 4 weeks (around 7/13/2022) for Office follow up, HF.    Patient was given instructions and counseling regarding her condition or for health maintenance advice. Please see specific information pulled into the AVS if appropriate.  Advised to call the Heart and Valve Center with any questions, concerns, or worsening symptoms.

## 2022-06-15 ENCOUNTER — OFFICE VISIT (OUTPATIENT)
Dept: CARDIOLOGY | Facility: HOSPITAL | Age: 82
End: 2022-06-15

## 2022-06-15 ENCOUNTER — LAB (OUTPATIENT)
Dept: LAB | Facility: HOSPITAL | Age: 82
End: 2022-06-15

## 2022-06-15 VITALS
OXYGEN SATURATION: 92 % | HEART RATE: 63 BPM | WEIGHT: 188 LBS | RESPIRATION RATE: 21 BRPM | TEMPERATURE: 97.6 F | DIASTOLIC BLOOD PRESSURE: 96 MMHG | BODY MASS INDEX: 33.31 KG/M2 | SYSTOLIC BLOOD PRESSURE: 134 MMHG | HEIGHT: 63 IN

## 2022-06-15 DIAGNOSIS — N17.9 AKI (ACUTE KIDNEY INJURY): ICD-10-CM

## 2022-06-15 DIAGNOSIS — I50.43 ACUTE ON CHRONIC COMBINED SYSTOLIC AND DIASTOLIC CHF (CONGESTIVE HEART FAILURE): Primary | ICD-10-CM

## 2022-06-15 DIAGNOSIS — I50.43 ACUTE ON CHRONIC COMBINED SYSTOLIC AND DIASTOLIC CHF (CONGESTIVE HEART FAILURE): ICD-10-CM

## 2022-06-15 LAB
ANION GAP SERPL CALCULATED.3IONS-SCNC: 8 MMOL/L (ref 5–15)
BUN SERPL-MCNC: 83 MG/DL (ref 8–23)
BUN/CREAT SERPL: 43 (ref 7–25)
CALCIUM SPEC-SCNC: 9.1 MG/DL (ref 8.6–10.5)
CHLORIDE SERPL-SCNC: 90 MMOL/L (ref 98–107)
CO2 SERPL-SCNC: 36 MMOL/L (ref 22–29)
CREAT SERPL-MCNC: 1.93 MG/DL (ref 0.57–1)
EGFRCR SERPLBLD CKD-EPI 2021: 25.4 ML/MIN/1.73
GLUCOSE SERPL-MCNC: 210 MG/DL (ref 65–99)
NT-PROBNP SERPL-MCNC: 2065 PG/ML (ref 0–1800)
POTASSIUM SERPL-SCNC: 4 MMOL/L (ref 3.5–5.2)
SODIUM SERPL-SCNC: 134 MMOL/L (ref 136–145)

## 2022-06-15 PROCEDURE — 83880 ASSAY OF NATRIURETIC PEPTIDE: CPT

## 2022-06-15 PROCEDURE — 80048 BASIC METABOLIC PNL TOTAL CA: CPT

## 2022-06-15 PROCEDURE — 36415 COLL VENOUS BLD VENIPUNCTURE: CPT

## 2022-06-15 PROCEDURE — 99214 OFFICE O/P EST MOD 30 MIN: CPT | Performed by: NURSE PRACTITIONER

## 2022-06-15 RX ORDER — POTASSIUM CHLORIDE 750 MG/1
10 TABLET, FILM COATED, EXTENDED RELEASE ORAL DAILY
Qty: 60 TABLET | Refills: 1
Start: 2022-06-15 | End: 2022-07-20

## 2022-06-15 RX ORDER — BUMETANIDE 2 MG/1
2 TABLET ORAL DAILY
Qty: 60 TABLET | Refills: 1
Start: 2022-06-15 | End: 2022-06-23 | Stop reason: SDUPTHER

## 2022-06-15 NOTE — PATIENT INSTRUCTIONS
Hold bumex and potassium for 2 days and then resume bumex at 2mg once a day and potassium at 10meq once a day  Continue to monitor blood pressure  Have labs drawn next week (Thursday)

## 2022-06-22 ENCOUNTER — LAB (OUTPATIENT)
Dept: LAB | Facility: HOSPITAL | Age: 82
End: 2022-06-22

## 2022-06-22 DIAGNOSIS — I10 ESSENTIAL HYPERTENSION: ICD-10-CM

## 2022-06-22 DIAGNOSIS — I50.43 ACUTE ON CHRONIC COMBINED SYSTOLIC AND DIASTOLIC CHF (CONGESTIVE HEART FAILURE): ICD-10-CM

## 2022-06-22 DIAGNOSIS — E11.65 TYPE 2 DIABETES MELLITUS WITH HYPERGLYCEMIA, WITH LONG-TERM CURRENT USE OF INSULIN: ICD-10-CM

## 2022-06-22 DIAGNOSIS — Z79.4 TYPE 2 DIABETES MELLITUS WITH HYPERGLYCEMIA, WITH LONG-TERM CURRENT USE OF INSULIN: ICD-10-CM

## 2022-06-22 DIAGNOSIS — N17.9 AKI (ACUTE KIDNEY INJURY): ICD-10-CM

## 2022-06-22 LAB
ALBUMIN SERPL-MCNC: 3.1 G/DL (ref 3.5–5.2)
ALBUMIN/GLOB SERPL: 1.1 G/DL
ALP SERPL-CCNC: 203 U/L (ref 39–117)
ALT SERPL W P-5'-P-CCNC: 56 U/L (ref 1–33)
ANION GAP SERPL CALCULATED.3IONS-SCNC: 6 MMOL/L (ref 5–15)
AST SERPL-CCNC: 70 U/L (ref 1–32)
BILIRUB SERPL-MCNC: 0.9 MG/DL (ref 0–1.2)
BUN SERPL-MCNC: 65 MG/DL (ref 8–23)
BUN/CREAT SERPL: 43.9 (ref 7–25)
CALCIUM SPEC-SCNC: 8.7 MG/DL (ref 8.6–10.5)
CHLORIDE SERPL-SCNC: 94 MMOL/L (ref 98–107)
CO2 SERPL-SCNC: 34 MMOL/L (ref 22–29)
CREAT SERPL-MCNC: 1.48 MG/DL (ref 0.57–1)
EGFRCR SERPLBLD CKD-EPI 2021: 35 ML/MIN/1.73
GLOBULIN UR ELPH-MCNC: 2.7 GM/DL
GLUCOSE SERPL-MCNC: 375 MG/DL (ref 65–99)
HBA1C MFR BLD: 10 % (ref 4.8–5.6)
POTASSIUM SERPL-SCNC: 4.2 MMOL/L (ref 3.5–5.2)
PROT SERPL-MCNC: 5.8 G/DL (ref 6–8.5)
SODIUM SERPL-SCNC: 134 MMOL/L (ref 136–145)

## 2022-06-22 PROCEDURE — 83036 HEMOGLOBIN GLYCOSYLATED A1C: CPT

## 2022-06-22 PROCEDURE — 80053 COMPREHEN METABOLIC PANEL: CPT

## 2022-06-22 PROCEDURE — 36415 COLL VENOUS BLD VENIPUNCTURE: CPT

## 2022-06-23 ENCOUNTER — TELEPHONE (OUTPATIENT)
Dept: CARDIOLOGY | Facility: HOSPITAL | Age: 82
End: 2022-06-23

## 2022-06-23 RX ORDER — BUMETANIDE 2 MG/1
1 TABLET ORAL DAILY
Qty: 60 TABLET | Refills: 1
Start: 2022-06-23 | End: 2022-07-20

## 2022-06-23 RX ORDER — AMLODIPINE BESYLATE 5 MG/1
5 TABLET ORAL DAILY
Qty: 30 TABLET | Refills: 11 | Status: ON HOLD
Start: 2022-06-23 | End: 2022-08-12 | Stop reason: SDUPTHER

## 2022-06-23 NOTE — TELEPHONE ENCOUNTER
"Results for orders placed or performed in visit on 06/22/22   Hemoglobin A1c    Specimen: Blood   Result Value Ref Range    Hemoglobin A1C 10.00 (H) 4.80 - 5.60 %   Comprehensive Metabolic Panel    Specimen: Blood   Result Value Ref Range    Glucose 375 (H) 65 - 99 mg/dL    BUN 65 (H) 8 - 23 mg/dL    Creatinine 1.48 (H) 0.57 - 1.00 mg/dL    Sodium 134 (L) 136 - 145 mmol/L    Potassium 4.2 3.5 - 5.2 mmol/L    Chloride 94 (L) 98 - 107 mmol/L    CO2 34.0 (H) 22.0 - 29.0 mmol/L    Calcium 8.7 8.6 - 10.5 mg/dL    Total Protein 5.8 (L) 6.0 - 8.5 g/dL    Albumin 3.10 (L) 3.50 - 5.20 g/dL    ALT (SGPT) 56 (H) 1 - 33 U/L    AST (SGOT) 70 (H) 1 - 32 U/L    Alkaline Phosphatase 203 (H) 39 - 117 U/L    Total Bilirubin 0.9 0.0 - 1.2 mg/dL    Globulin 2.7 gm/dL    A/G Ratio 1.1 g/dL    BUN/Creatinine Ratio 43.9 (H) 7.0 - 25.0    Anion Gap 6.0 5.0 - 15.0 mmol/L    eGFR 35.0 (L) >60.0 mL/min/1.73     Call patient with lab results.  Renal function is now close to baseline.  She complains of frequent urination and \"needing a break\".  She reports that her weight has remained stable and so is her edema.  I advised her that she could try cutting down to Bumex 1 mg daily but advised her to take an extra 1 mg as needed for increased shortness of breath or swelling.  She tells me that her blood pressures at home are running in the 150s to 170s.  For now, we will try resuming her amlodipine at 5 mg.  Advised her to monitor for worsening swelling and call me if this occurs.  I advised her to follow-up with her PCP due to uncontrolled diabetes and slightly elevated LFTs.  She verbalized understanding with no further questions or concerns  "

## 2022-06-24 ENCOUNTER — TELEPHONE (OUTPATIENT)
Dept: INTERNAL MEDICINE | Facility: CLINIC | Age: 82
End: 2022-06-24

## 2022-06-24 DIAGNOSIS — M86.071 ACUTE HEMATOGENOUS OSTEOMYELITIS OF RIGHT FOOT: ICD-10-CM

## 2022-06-24 DIAGNOSIS — M86.00 ACUTE HEMATOGENOUS OSTEOMYELITIS, UNSPECIFIED SITE: ICD-10-CM

## 2022-06-24 RX ORDER — PREGABALIN 100 MG/1
CAPSULE ORAL
Qty: 90 CAPSULE | Refills: 3 | Status: ON HOLD | OUTPATIENT
Start: 2022-06-24 | End: 2022-08-12 | Stop reason: SDUPTHER

## 2022-06-24 NOTE — TELEPHONE ENCOUNTER
Caller: [facility] - [place], [place] - [address] - [phone]  - [phone] FX    Relationship: Pharmacy    Best call back number: [phone]    Requested Prescriptions:      LYRICA 100 MG  THREE TIMES A DAILY    Pharmacy where request should be sent: [facility] - [place], [place] - [address] - [phone]  - [phone] FX     Additional details provided by patient:     PATIENT IS SWITCHING PROVIDERS AND NEEDS THIS MEDICATION REFILLED BY HER NEW PCP DR HO    Does the patient have less than a 3 day supply:  [] Yes  [x] No    Kenyon OLVERA Rep   06/24/22 11:55 EDT

## 2022-06-24 NOTE — TELEPHONE ENCOUNTER
----- Message from rAleen Doherty MD sent at 6/24/2022  9:29 AM EDT -----  A1c elevated, LFTs elevated, work on healthy diet and weight loss.  Repeat labs in 3 to 6 months.  Follow-up with endocrinology in the meantime

## 2022-06-30 ENCOUNTER — TELEPHONE (OUTPATIENT)
Dept: INTERNAL MEDICINE | Facility: CLINIC | Age: 82
End: 2022-06-30

## 2022-07-11 ENCOUNTER — TELEPHONE (OUTPATIENT)
Dept: INTERNAL MEDICINE | Facility: CLINIC | Age: 82
End: 2022-07-11

## 2022-07-11 NOTE — TELEPHONE ENCOUNTER
Caller: Susan Anderson    Relationship: Self    Best call back number:[phone]    What is the medical concern/diagnosis: REFERRAL     What specialty or service is being requested: PHYSICAL THERAPY     What is the provider, practice or medical service name: [facility]     What is the office location: [place]    What is the office phone number: N/A    Any additional details: PATIENT STATED THAT SHE WOULD LIKE TO GET BACK INTO [facility] FOR REHABILITATION THERAPY AGAIN    PLEASE ADVISE

## 2022-07-12 NOTE — PROGRESS NOTES
"[facility]  Heart and Valve Center    Chief Complaint  Congestive Heart Failure and Follow-up    Subjective    History of Present Illness {CC  Problem List  Visit  Diagnosis   Encounters  Notes  Medications  Labs  Result Review Imaging  Media :23}     Susan Anderson is a 82 y.o. 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 Her bumex was recently decreased to 1mg daily due to complaints of frequent urination. She tells me this week she started feeling episodes of disorientation and feeling weak. She notes muscle spasms in her \"torso\" and causes her to \"gasp for air\". She is moaning, lethargic and slow to respond. She notes ongoing exertional dyspnea but main complaint is generalized pain, disorientation.  We attempted to transfer her from her motorized wheelchair to to a regular wheelchair and she could not bear weight        Objective     Vital Signs:   Vitals:    07/13/22 1357   BP: 157/70   BP Location: Left arm   Patient Position: Sitting   Cuff Size: Adult   Pulse: 54   Resp: 20   Temp: 97.3 °F (36.3 °C)   TempSrc: Temporal   SpO2: 97%   Weight: 87.2 kg (192 lb 4 oz)   Height: 160 cm (63\")     Body mass index is 34.06 kg/m².  Physical Exam  Vitals reviewed.   Constitutional:       Appearance: Normal appearance. She is ill-appearing.   HENT:      Head: Normocephalic.   Neck:      Vascular: No carotid bruit.   Cardiovascular:      Rate and Rhythm: Regular rhythm. Bradycardia present.      Pulses: Normal pulses.      Heart sounds: Normal heart sounds, S1 normal and S2 normal. No murmur heard.  Pulmonary:      Effort: Pulmonary effort is normal. No respiratory distress.      Breath sounds: Examination of the right-lower field reveals rales. Rales present. " "  Chest:      Chest wall: No tenderness.   Abdominal:      General: Abdomen is flat. There is distension.      Palpations: Abdomen is soft.   Musculoskeletal:      Cervical back: Neck supple.      Right lower le+ Pitting Edema present.      Left lower le+ Pitting Edema present.   Skin:     General: Skin is warm and dry.   Neurological:      General: No focal deficit present.      Mental Status: She is oriented to person, place, and time. Mental status is at baseline. She is lethargic.   Psychiatric:         Mood and Affect: Affect is tearful.         Speech: Speech is delayed.         Behavior: Behavior is slowed.         Thought Content: Thought content normal.              Result Review  Data Reviewed:{ Labs  Result Review  Imaging  Med Tab  Media :23}     Cardiac Catheterization/Vascular Study (2021 09:22)  Adult Transthoracic Echo Complete W/ Cont if Necessary Per Protocol (2021 16:21)    Adult Transthoracic Echo Complete W/ Cont if Necessary Per Protocol (2022 15:32)    Comprehensive Metabolic Panel (2022 12:42)  Hemoglobin A1c (2022 12:42)  proBNP (06/15/2022 13:18)      Blood sugar in clinic was 335     Assessment and Plan {CC Problem List  Visit Diagnosis  ROS  Review (Popup)  Health Maintenance  Quality  BestPractice  Medications  SmartSets  SnapShot Encounters  Media :23}   1. Altered mental status  2. Generalized weakness/\"muscle spasms\"  3. Chronic combined systolic and diastolic CHF (congestive heart failure) (HCC) EF 45-50%  - Appears fluid overloaded but had recent LACEY on higher doses of bumex, dose recently reducedbut does not know what dose she is taking. Concerned for worsening renal fuction  4. CKD, III B   - Had recent LACEY after diuresis.   5. HTN   - elevated today, but tells me it is low at home. Can't provide numbers  6. Uncontrolled diabetes  - Blood glucose 335 in clinic. Reports it was 85 this morning. Recent A1c 10    PLAN: Long " discussion with patient and caregiver regarding her change in mental status.  Patient is very reluctant to go to the ED.  However, I am very concerned that there may be an underlying metabolic process versus infection.  Patient adamantly refuses that I call her daughter to discuss.  I did discuss it with her caregiver who is present today and she agrees that this is outside of her normal behavior and is also very concerned.  Recommend that she be evaluated in the ED and after further discussion with the patient she finally agrees to do this.  I called the ED with report and she was transported to the ED via wheelchair        Follow Up {Instructions Charge Capture  Follow-up Communications :23}   No follow-ups on file.    Patient was given instructions and counseling regarding her condition or for health maintenance advice. Please see specific information pulled into the AVS if appropriate.  Advised to call the Heart and Valve Center with any questions, concerns, or worsening symptoms.

## 2022-07-13 ENCOUNTER — APPOINTMENT (OUTPATIENT)
Dept: GENERAL RADIOLOGY | Facility: HOSPITAL | Age: 82
End: 2022-07-13

## 2022-07-13 ENCOUNTER — APPOINTMENT (OUTPATIENT)
Dept: CT IMAGING | Facility: HOSPITAL | Age: 82
End: 2022-07-13

## 2022-07-13 ENCOUNTER — HOSPITAL ENCOUNTER (EMERGENCY)
Facility: HOSPITAL | Age: 82
Discharge: HOME | End: 2022-07-13
Attending: EMERGENCY MEDICINE | Admitting: EMERGENCY MEDICINE

## 2022-07-13 ENCOUNTER — OFFICE VISIT (OUTPATIENT)
Dept: CARDIOLOGY | Facility: HOSPITAL | Age: 82
End: 2022-07-13

## 2022-07-13 VITALS
BODY MASS INDEX: 34.06 KG/M2 | DIASTOLIC BLOOD PRESSURE: 70 MMHG | RESPIRATION RATE: 20 BRPM | SYSTOLIC BLOOD PRESSURE: 157 MMHG | WEIGHT: 192.25 LBS | HEIGHT: 63 IN | TEMPERATURE: 97.3 F | OXYGEN SATURATION: 97 % | HEART RATE: 54 BPM

## 2022-07-13 VITALS
HEART RATE: 63 BPM | DIASTOLIC BLOOD PRESSURE: 78 MMHG | BODY MASS INDEX: 34.02 KG/M2 | WEIGHT: 192 LBS | SYSTOLIC BLOOD PRESSURE: 145 MMHG | OXYGEN SATURATION: 95 % | TEMPERATURE: 98 F | RESPIRATION RATE: 20 BRPM | HEIGHT: 63 IN

## 2022-07-13 DIAGNOSIS — R53.1 GENERALIZED WEAKNESS: ICD-10-CM

## 2022-07-13 DIAGNOSIS — I10 ESSENTIAL HYPERTENSION: ICD-10-CM

## 2022-07-13 DIAGNOSIS — R53.1 GENERALIZED WEAKNESS: Primary | ICD-10-CM

## 2022-07-13 DIAGNOSIS — I50.42 CHRONIC COMBINED SYSTOLIC AND DIASTOLIC HEART FAILURE: ICD-10-CM

## 2022-07-13 DIAGNOSIS — N18.32 STAGE 3B CHRONIC KIDNEY DISEASE: ICD-10-CM

## 2022-07-13 DIAGNOSIS — R41.82 ALTERED MENTAL STATUS, UNSPECIFIED ALTERED MENTAL STATUS TYPE: Primary | ICD-10-CM

## 2022-07-13 DIAGNOSIS — N18.9 CHRONIC RENAL IMPAIRMENT, UNSPECIFIED CKD STAGE: ICD-10-CM

## 2022-07-13 LAB
ALBUMIN SERPL-MCNC: 3.4 G/DL (ref 3.5–5.2)
ALBUMIN/GLOB SERPL: 1.1 G/DL
ALP SERPL-CCNC: 291 U/L (ref 39–117)
ALT SERPL W P-5'-P-CCNC: 47 U/L (ref 1–33)
ANION GAP SERPL CALCULATED.3IONS-SCNC: 12 MMOL/L (ref 5–15)
AST SERPL-CCNC: 55 U/L (ref 1–32)
BACTERIA UR QL AUTO: ABNORMAL /HPF
BASOPHILS # BLD AUTO: 0.02 10*3/MM3 (ref 0–0.2)
BASOPHILS NFR BLD AUTO: 0.3 % (ref 0–1.5)
BILIRUB SERPL-MCNC: 1.2 MG/DL (ref 0–1.2)
BILIRUB UR QL STRIP: NEGATIVE
BUN SERPL-MCNC: 96 MG/DL (ref 8–23)
BUN/CREAT SERPL: 44.4 (ref 7–25)
CALCIUM SPEC-SCNC: 8.8 MG/DL (ref 8.6–10.5)
CHLORIDE SERPL-SCNC: 94 MMOL/L (ref 98–107)
CLARITY UR: ABNORMAL
CO2 SERPL-SCNC: 28 MMOL/L (ref 22–29)
COLOR UR: YELLOW
CREAT SERPL-MCNC: 2.16 MG/DL (ref 0.57–1)
D-LACTATE SERPL-SCNC: 1 MMOL/L (ref 0.5–2)
DEPRECATED RDW RBC AUTO: 53.1 FL (ref 37–54)
EGFRCR SERPLBLD CKD-EPI 2021: 22.2 ML/MIN/1.73
EOSINOPHIL # BLD AUTO: 0.03 10*3/MM3 (ref 0–0.4)
EOSINOPHIL NFR BLD AUTO: 0.5 % (ref 0.3–6.2)
ERYTHROCYTE [DISTWIDTH] IN BLOOD BY AUTOMATED COUNT: 16.2 % (ref 12.3–15.4)
FLUAV RNA RESP QL NAA+PROBE: NOT DETECTED
FLUBV RNA RESP QL NAA+PROBE: NOT DETECTED
GLOBULIN UR ELPH-MCNC: 3 GM/DL
GLUCOSE BLDC GLUCOMTR-MCNC: 287 MG/DL (ref 70–130)
GLUCOSE SERPL-MCNC: 284 MG/DL (ref 65–99)
GLUCOSE UR STRIP-MCNC: NEGATIVE MG/DL
HCT VFR BLD AUTO: 32.8 % (ref 34–46.6)
HGB BLD-MCNC: 10.9 G/DL (ref 12–15.9)
HGB UR QL STRIP.AUTO: ABNORMAL
HOLD SPECIMEN: NORMAL
IMM GRANULOCYTES # BLD AUTO: 0.12 10*3/MM3 (ref 0–0.05)
IMM GRANULOCYTES NFR BLD AUTO: 1.9 % (ref 0–0.5)
KETONES UR QL STRIP: NEGATIVE
LEUKOCYTE ESTERASE UR QL STRIP.AUTO: NEGATIVE
LYMPHOCYTES # BLD AUTO: 1.08 10*3/MM3 (ref 0.7–3.1)
LYMPHOCYTES NFR BLD AUTO: 17.4 % (ref 19.6–45.3)
MAGNESIUM SERPL-MCNC: 2.2 MG/DL (ref 1.6–2.4)
MCH RBC QN AUTO: 30 PG (ref 26.6–33)
MCHC RBC AUTO-ENTMCNC: 33.2 G/DL (ref 31.5–35.7)
MCV RBC AUTO: 90.4 FL (ref 79–97)
MONOCYTES # BLD AUTO: 0.33 10*3/MM3 (ref 0.1–0.9)
MONOCYTES NFR BLD AUTO: 5.3 % (ref 5–12)
NEUTROPHILS NFR BLD AUTO: 4.63 10*3/MM3 (ref 1.7–7)
NEUTROPHILS NFR BLD AUTO: 74.6 % (ref 42.7–76)
NITRITE UR QL STRIP: NEGATIVE
NRBC BLD AUTO-RTO: 0.3 /100 WBC (ref 0–0.2)
PH UR STRIP.AUTO: <=5 [PH] (ref 5–8)
PLATELET # BLD AUTO: 158 10*3/MM3 (ref 140–450)
PMV BLD AUTO: 13.5 FL (ref 6–12)
POTASSIUM SERPL-SCNC: 4.9 MMOL/L (ref 3.5–5.2)
PROCALCITONIN SERPL-MCNC: 0.11 NG/ML (ref 0–0.25)
PROT SERPL-MCNC: 6.4 G/DL (ref 6–8.5)
PROT UR QL STRIP: ABNORMAL
QT INTERVAL: 470 MS
QTC INTERVAL: 453 MS
RBC # BLD AUTO: 3.63 10*6/MM3 (ref 3.77–5.28)
RBC # UR STRIP: ABNORMAL /HPF
REF LAB TEST METHOD: ABNORMAL
RSV RNA NPH QL NAA+NON-PROBE: NOT DETECTED
SARS-COV-2 RNA RESP QL NAA+PROBE: NOT DETECTED
SODIUM SERPL-SCNC: 134 MMOL/L (ref 136–145)
SP GR UR STRIP: 1.01 (ref 1–1.03)
SQUAMOUS #/AREA URNS HPF: ABNORMAL /HPF
TROPONIN T SERPL-MCNC: 0.32 NG/ML (ref 0–0.03)
TROPONIN T SERPL-MCNC: 0.34 NG/ML (ref 0–0.03)
UROBILINOGEN UR QL STRIP: ABNORMAL
WBC # UR STRIP: ABNORMAL /HPF
WBC NRBC COR # BLD: 6.21 10*3/MM3 (ref 3.4–10.8)
WHOLE BLOOD HOLD COAG: NORMAL
WHOLE BLOOD HOLD SPECIMEN: NORMAL

## 2022-07-13 PROCEDURE — 99214 OFFICE O/P EST MOD 30 MIN: CPT | Performed by: NURSE PRACTITIONER

## 2022-07-13 PROCEDURE — 83735 ASSAY OF MAGNESIUM: CPT | Performed by: EMERGENCY MEDICINE

## 2022-07-13 PROCEDURE — 83605 ASSAY OF LACTIC ACID: CPT | Performed by: PHYSICIAN ASSISTANT

## 2022-07-13 PROCEDURE — 74176 CT ABD & PELVIS W/O CONTRAST: CPT

## 2022-07-13 PROCEDURE — 81001 URINALYSIS AUTO W/SCOPE: CPT | Performed by: PHYSICIAN ASSISTANT

## 2022-07-13 PROCEDURE — 84484 ASSAY OF TROPONIN QUANT: CPT | Performed by: PHYSICIAN ASSISTANT

## 2022-07-13 PROCEDURE — 84145 PROCALCITONIN (PCT): CPT | Performed by: PHYSICIAN ASSISTANT

## 2022-07-13 PROCEDURE — 80053 COMPREHEN METABOLIC PANEL: CPT | Performed by: EMERGENCY MEDICINE

## 2022-07-13 PROCEDURE — 93005 ELECTROCARDIOGRAM TRACING: CPT | Performed by: PHYSICIAN ASSISTANT

## 2022-07-13 PROCEDURE — 85025 COMPLETE CBC W/AUTO DIFF WBC: CPT | Performed by: EMERGENCY MEDICINE

## 2022-07-13 PROCEDURE — 99283 EMERGENCY DEPT VISIT LOW MDM: CPT

## 2022-07-13 PROCEDURE — 84484 ASSAY OF TROPONIN QUANT: CPT | Performed by: EMERGENCY MEDICINE

## 2022-07-13 PROCEDURE — 93005 ELECTROCARDIOGRAM TRACING: CPT | Performed by: EMERGENCY MEDICINE

## 2022-07-13 PROCEDURE — 82962 GLUCOSE BLOOD TEST: CPT

## 2022-07-13 PROCEDURE — 71045 X-RAY EXAM CHEST 1 VIEW: CPT

## 2022-07-13 PROCEDURE — 36415 COLL VENOUS BLD VENIPUNCTURE: CPT

## 2022-07-13 PROCEDURE — 87637 SARSCOV2&INF A&B&RSV AMP PRB: CPT | Performed by: PHYSICIAN ASSISTANT

## 2022-07-13 RX ORDER — SODIUM CHLORIDE 0.9 % (FLUSH) 0.9 %
10 SYRINGE (ML) INJECTION AS NEEDED
Status: DISCONTINUED | OUTPATIENT
Start: 2022-07-13 | End: 2022-07-13 | Stop reason: HOSPADM

## 2022-07-14 NOTE — ED PROVIDER NOTES
Subjective   82-year-old female was brought to the emergency department from cardiology after they felt like she may have been having some generalized weakness and possible confusion.  Apparently Ms. Anderson lives by herself but has a caregiver that comes up to see her every day to help her get ready and get dressed and general every day tasks and chores.  This that she feels like she just a little bit more weak today.  She had no falls no trauma no head trauma.  She went to cardiology today for routine checkup and they felt like she was a little bit more weak than normal.  She typically rides around in a motorized chair.  He is usually able to stand up and pivot and sit and she was able to do that today.  She states that she has not not been confused as to person place or time just feels very fatigued.  She denies any dysuria frequency urgency or hematuria.  She had no shortness of breath no cough no chest pain.  She has no diaphoresis.  She does states has been having some back spasms.  She reports that she has had no fall no trauma no previous pathological fractures of her lumbar spine.  Notes were most of this pain is.  Pain does not radiate around to her abdomen or pelvis.      History provided by:  Patient   used: No    Weakness - Generalized  Severity:  Moderate  Onset quality:  Gradual  Duration:  4 days  Timing:  Constant  Progression:  Waxing and waning  Chronicity:  Recurrent  Context: not alcohol use, not allergies, not change in medication, not decreased sleep, not drug use, not increased activity, not pinched nerve, not stress and not urinary tract infection    Relieved by:  Rest  Worsened by:  Activity  Ineffective treatments:  None tried  Associated symptoms: difficulty walking and lethargy    Associated symptoms: no aphasia, no ataxia, no chest pain, no cough, no diarrhea, no dizziness, no drooling, no numbness in extremities, no fever, no foul-smelling urine, no headaches, no  hematochezia, no myalgias, no near-syncope, no sensory-motor deficit, no shortness of breath, no stroke symptoms, no syncope, no urgency, no vision change and no vomiting    Risk factors: anemia, coronary artery disease, diabetes and heart disease        Review of Systems   Constitutional: Negative for chills and fever.   HENT: Negative for drooling.    Respiratory: Negative for cough, chest tightness, shortness of breath and stridor.    Cardiovascular: Negative for chest pain, palpitations, syncope and near-syncope.   Gastrointestinal: Negative for diarrhea, hematochezia and vomiting.   Genitourinary: Negative for urgency.   Musculoskeletal: Negative for myalgias.   Skin: Negative for pallor and rash.   Neurological: Negative for dizziness and headaches.   Psychiatric/Behavioral: Negative.    All other systems reviewed and are negative.      Past Medical History:   Diagnosis Date   • Arthritis    • Asthma 6/4 - double pneumonia    Currently on inhaler and nebulizer   • Cancer (HCC)     cervical cancer, skin cancer   • CHF (congestive heart failure) ([facility]) June 4, 2021   • Chronic kidney disease Related to diabetes   • Coronary artery disease 6/4/2021 DX for hear failure   • Diabetes mellitus (HCC) 30 years    Seeing Dr. Lam 1st time Aug 19   • Gout    • Hx of colonoscopy    • Hyperlipidemia Reference current labs x 2-3yrs approx   • Hypertension 30 years   • Migraine    • Mitral valve disease    • Mitral valve disease    • Osteomyelitis (HCC)    • Peripheral neuropathy    • Sleep apnea    • Type 2 diabetes mellitus (HCC)     30 years       Allergies   Allergen Reactions   • Cephalexin Nausea Only   • Erythromycin Base Nausea Only   • Oxycodone Nausea Only   • Sulfa Antibiotics Nausea Only       Past Surgical History:   Procedure Laterality Date   • ABDOMINAL HYSTERECTOMY W/SALPINGECTOMY     • AMPUTATION  Right great toe, 1st 1/3 metatarsal -Reg 4/14/21   • AORTAGRAM N/A 4/9/2021    Procedure: AORTAGRAM WITH OR  WITHOUT RUNOFFS, WITH Co2;  Surgeon: Trey Forrest MD;  Location:  [facility];  Service: Vascular;  Laterality: N/A;   • APPENDECTOMY     • BRAIN TUMOR EXCISION      laser surgery    • CARDIAC CATHETERIZATION N/A 6/21/2021    Procedure: LEFT HEART CATH;  Surgeon: Anjum Ceballos MD;  Location:  [facility];  Service: Cardiology;  Laterality: N/A;   • CATARACT EXTRACTION W/ INTRAOCULAR LENS  IMPLANT, BILATERAL     • CHOLECYSTECTOMY     • EYE SURGERY     • HYSTERECTOMY     • TOE SURGERY     • TRANS METATARSAL AMPUTATION Right 4/14/2021    Procedure: AMPUTATION TRANS METATARSAL RIGHT GREAT TOE;  Surgeon: Valdez Finney MD;  Location:  [facility];  Service: Vascular;  Laterality: Right;       Family History   Problem Relation Age of Onset   • Diabetes Mother         Type II   • Heart disease Father    • Heart attack Father    • Coronary artery disease Father    • Diabetes Father         Type II   • Diabetes Sister    • Diabetes Maternal Grandmother    • Diabetes Maternal Grandfather    • Diabetes Paternal Grandmother    • Diabetes Paternal Grandfather        Social History     Socioeconomic History   • Marital status:    • Number of children: 1   Tobacco Use   • Smoking status: Never Smoker   • Smokeless tobacco: Never Used   Vaping Use   • Vaping Use: Never used   Substance and Sexual Activity   • Alcohol use: Yes     Comment: Social drinking when not recovering from hospitalization   • Drug use: Never   • Sexual activity: Not Currently     Birth control/protection: None           Objective   Physical Exam  Vitals and nursing note reviewed.   Constitutional:       Appearance: She is well-developed.   HENT:      Head: Normocephalic and atraumatic.      Right Ear: External ear normal.      Left Ear: External ear normal.      Nose: Nose normal.   Eyes:      General: No scleral icterus.     Conjunctiva/sclera: Conjunctivae normal.      Pupils: Pupils are equal, round, and reactive to light.    Neck:      Thyroid: No thyromegaly.   Cardiovascular:      Rate and Rhythm: Normal rate and regular rhythm.      Heart sounds: Normal heart sounds.   Pulmonary:      Effort: Pulmonary effort is normal. No respiratory distress.      Breath sounds: Normal breath sounds. No wheezing or rales.   Chest:      Chest wall: No tenderness.   Abdominal:      General: Bowel sounds are normal. There is no distension.      Palpations: Abdomen is soft.      Tenderness: There is no abdominal tenderness.      Comments: Abdomen is distended and hypertympanic.  Distant bowel sounds.  She is not vomiting.   Musculoskeletal:         General: Normal range of motion.      Cervical back: Normal range of motion.      Comments: Diffuse tenderness of the lumbar spine and paraspinous muscles.  There is no step-off no crepitus no rash.  Strength lower extremities 4/5 bilaterally.   Lymphadenopathy:      Cervical: No cervical adenopathy.   Skin:     General: Skin is warm and dry.   Neurological:      Mental Status: She is alert and oriented to person, place, and time.      Cranial Nerves: No cranial nerve deficit.      Coordination: Coordination normal.      Deep Tendon Reflexes: Reflexes are normal and symmetric. Reflexes normal.   Psychiatric:         Behavior: Behavior normal.         Thought Content: Thought content normal.         Judgment: Judgment normal.         Procedures           ED Course  ED Course as of 07/13/22 2139 Wed Jul 13, 2022 1932 Discussed with her caregiver and her friend.  Her troponins are chronically elevated they are about the same.  She does not have a UTI.  She has chronic renal insufficiency is not any worse than normal.  I offered her admission due to her weakness she [place] back spasms she states that she is going home.  We discussed this with her friend and her caregiver the friends go to stay with her.  She states she will return if she gets worse. [OLEGARIO]      ED Course User Index  [OLEGARIO] Trey Tobar  MAY Tyson           Recent Results (from the past 24 hour(s))   POC Glucose Once    Collection Time: 07/13/22  3:44 PM    Specimen: Blood   Result Value Ref Range    Glucose 287 (H) 70 - 130 mg/dL   ECG 12 Lead    Collection Time: 07/13/22  3:46 PM   Result Value Ref Range    QT Interval 470 ms    QTC Interval 453 ms   Comprehensive Metabolic Panel    Collection Time: 07/13/22  3:47 PM    Specimen: Blood   Result Value Ref Range    Glucose 284 (H) 65 - 99 mg/dL    BUN 96 (H) 8 - 23 mg/dL    Creatinine 2.16 (H) 0.57 - 1.00 mg/dL    Sodium 134 (L) 136 - 145 mmol/L    Potassium 4.9 3.5 - 5.2 mmol/L    Chloride 94 (L) 98 - 107 mmol/L    CO2 28.0 22.0 - 29.0 mmol/L    Calcium 8.8 8.6 - 10.5 mg/dL    Total Protein 6.4 6.0 - 8.5 g/dL    Albumin 3.40 (L) 3.50 - 5.20 g/dL    ALT (SGPT) 47 (H) 1 - 33 U/L    AST (SGOT) 55 (H) 1 - 32 U/L    Alkaline Phosphatase 291 (H) 39 - 117 U/L    Total Bilirubin 1.2 0.0 - 1.2 mg/dL    Globulin 3.0 gm/dL    A/G Ratio 1.1 g/dL    BUN/Creatinine Ratio 44.4 (H) 7.0 - 25.0    Anion Gap 12.0 5.0 - 15.0 mmol/L    eGFR 22.2 (L) >60.0 mL/min/1.73   Troponin    Collection Time: 07/13/22  3:47 PM    Specimen: Blood   Result Value Ref Range    Troponin T 0.345 (C) 0.000 - 0.030 ng/mL   Magnesium    Collection Time: 07/13/22  3:47 PM    Specimen: Blood   Result Value Ref Range    Magnesium 2.2 1.6 - 2.4 mg/dL   Green Top (Gel)    Collection Time: 07/13/22  3:47 PM   Result Value Ref Range    Extra Tube Hold for add-ons.    Lavender Top    Collection Time: 07/13/22  3:47 PM   Result Value Ref Range    Extra Tube hold for add-on    Gold Top - SST    Collection Time: 07/13/22  3:47 PM   Result Value Ref Range    Extra Tube Hold for add-ons.    Gray Top    Collection Time: 07/13/22  3:47 PM   Result Value Ref Range    Extra Tube Hold for add-ons.    Light Blue Top    Collection Time: 07/13/22  3:47 PM   Result Value Ref Range    Extra Tube Hold for add-ons.    CBC Auto Differential    Collection Time:  07/13/22  3:47 PM    Specimen: Blood   Result Value Ref Range    WBC 6.21 3.40 - 10.80 10*3/mm3    RBC 3.63 (L) 3.77 - 5.28 10*6/mm3    Hemoglobin 10.9 (L) 12.0 - 15.9 g/dL    Hematocrit 32.8 (L) 34.0 - 46.6 %    MCV 90.4 79.0 - 97.0 fL    MCH 30.0 26.6 - 33.0 pg    MCHC 33.2 31.5 - 35.7 g/dL    RDW 16.2 (H) 12.3 - 15.4 %    RDW-SD 53.1 37.0 - 54.0 fl    MPV 13.5 (H) 6.0 - 12.0 fL    Platelets 158 140 - 450 10*3/mm3    Neutrophil % 74.6 42.7 - 76.0 %    Lymphocyte % 17.4 (L) 19.6 - 45.3 %    Monocyte % 5.3 5.0 - 12.0 %    Eosinophil % 0.5 0.3 - 6.2 %    Basophil % 0.3 0.0 - 1.5 %    Immature Grans % 1.9 (H) 0.0 - 0.5 %    Neutrophils, Absolute 4.63 1.70 - 7.00 10*3/mm3    Lymphocytes, Absolute 1.08 0.70 - 3.10 10*3/mm3    Monocytes, Absolute 0.33 0.10 - 0.90 10*3/mm3    Eosinophils, Absolute 0.03 0.00 - 0.40 10*3/mm3    Basophils, Absolute 0.02 0.00 - 0.20 10*3/mm3    Immature Grans, Absolute 0.12 (H) 0.00 - 0.05 10*3/mm3    nRBC 0.3 (H) 0.0 - 0.2 /100 WBC   Urinalysis With Culture If Indicated - Urine, Catheter    Collection Time: 07/13/22  3:47 PM    Specimen: Urine, Catheter   Result Value Ref Range    Color, UA Yellow Yellow, Straw    Appearance, UA Cloudy (A) Clear    pH, UA <=5.0 5.0 - 8.0    Specific Gravity, UA 1.014 1.001 - 1.030    Glucose, UA Negative Negative    Ketones, UA Negative Negative    Bilirubin, UA Negative Negative    Blood, UA Moderate (2+) (A) Negative    Protein, UA >=300 mg/dL (3+) (A) Negative    Leuk Esterase, UA Negative Negative    Nitrite, UA Negative Negative    Urobilinogen, UA 0.2 E.U./dL 0.2 - 1.0 E.U./dL   COVID-19, FLU A/B, RSV PCR - Swab, Nasopharynx    Collection Time: 07/13/22  3:47 PM    Specimen: Nasopharynx; Swab   Result Value Ref Range    COVID19 Not Detected Not Detected - Ref. Range    Influenza A PCR Not Detected Not Detected    Influenza B PCR Not Detected Not Detected    RSV, PCR Not Detected Not Detected   Procalcitonin    Collection Time: 07/13/22  3:47 PM     Specimen: Blood   Result Value Ref Range    Procalcitonin 0.11 0.00 - 0.25 ng/mL   Lactic Acid, Plasma    Collection Time: 07/13/22  3:47 PM    Specimen: Blood   Result Value Ref Range    Lactate 1.0 0.5 - 2.0 mmol/L   Urinalysis, Microscopic Only - Urine, Catheter    Collection Time: 07/13/22  3:47 PM    Specimen: Urine, Catheter   Result Value Ref Range    RBC, UA 7-12 (A) None Seen, 0-2 /HPF    WBC, UA 3-5 (A) None Seen, 0-2 /HPF    Bacteria, UA None Seen None Seen, Trace /HPF    Squamous Epithelial Cells, UA 3-6 (A) None Seen, 0-2 /HPF    Methodology Manual Light Microscopy    Troponin    Collection Time: 07/13/22  6:56 PM    Specimen: Blood   Result Value Ref Range    Troponin T 0.317 (C) 0.000 - 0.030 ng/mL     Note: In addition to lab results from this visit, the labs listed above may include labs taken at another facility or during a different encounter within the last 24 hours. Please correlate lab times with ED admission and discharge times for further clarification of the services performed during this visit.    CT Abdomen Pelvis Without Contrast   Final Result       1. No acute intra-abdominal or pelvic process.   2. Trace amount perihepatic ascites with trace to small right-sided   pleural effusion with diffuse anasarca the soft tissues likely related   to fluid overload.   3. Ancillary findings as described above.           This report was finalized on 7/13/2022 5:23 PM by Karen Rey MD.          XR Chest 1 View   Final Result   Mild pulmonary edema pattern with small bilateral pleural effusions.       This report was finalized on 7/13/2022 4:04 PM by Karen Rey MD.            Vitals:    07/13/22 1632 07/13/22 1831 07/13/22 1836 07/13/22 1902   BP: 150/68 141/69  145/78   BP Location:       Patient Position:       Pulse: 59  63 63   Resp:       Temp:       TempSrc:       SpO2: 94%  95% 95%   Weight:       Height:         Medications   sodium chloride 0.9 % flush 10 mL (has no administration in time  range)     ECG/EMG Results (last 24 hours)     Procedure Component Value Units Date/Time    ECG 12 Lead [[id number]] Collected: 07/13/22 1853     Updated: 07/13/22 1853    ECG 12 Lead [[id number]] Collected: 07/13/22 1546     Updated: 07/13/22 1928     QT Interval 470 ms      QTC Interval 453 ms     Narrative:      Test Reason : Weak/Dizzy/AMS protocol  Blood Pressure :   */*   mmHG  Vent. Rate :  56 BPM     Atrial Rate :  53 BPM     P-R Int :   * ms          QRS Dur :  90 ms      QT Int : 470 ms       P-R-T Axes :   * -50  74 degrees     QTc Int : 453 ms    Normal sinus rhythm  Low voltage QRS  Left anterior fascicular block  Cannot rule out Inferior infarct (masked by fascicular block?) , age undetermined  Cannot rule out Anterior infarct (cited on or before 15-APR-2021)  Abnormal ECG  Confirmed by MD Hooper Michael ([id number]) on 7/13/2022 7:28:52 PM    Referred By: EDMD           Confirmed By: Avtar Hooper MD        ECG 12 Lead   Preliminary Result         ECG 12 Lead   Final Result   Test Reason : Weak/Dizzy/AMS protocol   Blood Pressure :   */*   mmHG   Vent. Rate :  56 BPM     Atrial Rate :  53 BPM      P-R Int :   * ms          QRS Dur :  90 ms       QT Int : 470 ms       P-R-T Axes :   * -50  74 degrees      QTc Int : 453 ms      Normal sinus rhythm   Low voltage QRS   Left anterior fascicular block   Cannot rule out Inferior infarct (masked by fascicular block?) , age    undetermined   Cannot rule out Anterior infarct (cited on or before 15-APR-2021)   Abnormal ECG   Confirmed by MD Hooper Michael ([id number]) on 7/13/2022 7:28:52 PM      Referred By: BELLA           Confirmed By: Avtar Hooper MD                                Recent Results (from the past 24 hour(s))   POC Glucose Once    Collection Time: 07/13/22  3:44 PM    Specimen: Blood   Result Value Ref Range    Glucose 287 (H) 70 - 130 mg/dL   ECG 12 Lead    Collection Time: 07/13/22  3:46 PM   Result Value Ref Range    QT Interval 470 ms    QTC Interval 453 ms    Comprehensive Metabolic Panel    Collection Time: 07/13/22  3:47 PM    Specimen: Blood   Result Value Ref Range    Glucose 284 (H) 65 - 99 mg/dL    BUN 96 (H) 8 - 23 mg/dL    Creatinine 2.16 (H) 0.57 - 1.00 mg/dL    Sodium 134 (L) 136 - 145 mmol/L    Potassium 4.9 3.5 - 5.2 mmol/L    Chloride 94 (L) 98 - 107 mmol/L    CO2 28.0 22.0 - 29.0 mmol/L    Calcium 8.8 8.6 - 10.5 mg/dL    Total Protein 6.4 6.0 - 8.5 g/dL    Albumin 3.40 (L) 3.50 - 5.20 g/dL    ALT (SGPT) 47 (H) 1 - 33 U/L    AST (SGOT) 55 (H) 1 - 32 U/L    Alkaline Phosphatase 291 (H) 39 - 117 U/L    Total Bilirubin 1.2 0.0 - 1.2 mg/dL    Globulin 3.0 gm/dL    A/G Ratio 1.1 g/dL    BUN/Creatinine Ratio 44.4 (H) 7.0 - 25.0    Anion Gap 12.0 5.0 - 15.0 mmol/L    eGFR 22.2 (L) >60.0 mL/min/1.73   Troponin    Collection Time: 07/13/22  3:47 PM    Specimen: Blood   Result Value Ref Range    Troponin T 0.345 (C) 0.000 - 0.030 ng/mL   Magnesium    Collection Time: 07/13/22  3:47 PM    Specimen: Blood   Result Value Ref Range    Magnesium 2.2 1.6 - 2.4 mg/dL   Green Top (Gel)    Collection Time: 07/13/22  3:47 PM   Result Value Ref Range    Extra Tube Hold for add-ons.    Lavender Top    Collection Time: 07/13/22  3:47 PM   Result Value Ref Range    Extra Tube hold for add-on    Gold Top - SST    Collection Time: 07/13/22  3:47 PM   Result Value Ref Range    Extra Tube Hold for add-ons.    Gray Top    Collection Time: 07/13/22  3:47 PM   Result Value Ref Range    Extra Tube Hold for add-ons.    Light Blue Top    Collection Time: 07/13/22  3:47 PM   Result Value Ref Range    Extra Tube Hold for add-ons.    CBC Auto Differential    Collection Time: 07/13/22  3:47 PM    Specimen: Blood   Result Value Ref Range    WBC 6.21 3.40 - 10.80 10*3/mm3    RBC 3.63 (L) 3.77 - 5.28 10*6/mm3    Hemoglobin 10.9 (L) 12.0 - 15.9 g/dL    Hematocrit 32.8 (L) 34.0 - 46.6 %    MCV 90.4 79.0 - 97.0 fL    MCH 30.0 26.6 - 33.0 pg    MCHC 33.2 31.5 - 35.7 g/dL    RDW 16.2 (H) 12.3 - 15.4 %     RDW-SD 53.1 37.0 - 54.0 fl    MPV 13.5 (H) 6.0 - 12.0 fL    Platelets 158 140 - 450 10*3/mm3    Neutrophil % 74.6 42.7 - 76.0 %    Lymphocyte % 17.4 (L) 19.6 - 45.3 %    Monocyte % 5.3 5.0 - 12.0 %    Eosinophil % 0.5 0.3 - 6.2 %    Basophil % 0.3 0.0 - 1.5 %    Immature Grans % 1.9 (H) 0.0 - 0.5 %    Neutrophils, Absolute 4.63 1.70 - 7.00 10*3/mm3    Lymphocytes, Absolute 1.08 0.70 - 3.10 10*3/mm3    Monocytes, Absolute 0.33 0.10 - 0.90 10*3/mm3    Eosinophils, Absolute 0.03 0.00 - 0.40 10*3/mm3    Basophils, Absolute 0.02 0.00 - 0.20 10*3/mm3    Immature Grans, Absolute 0.12 (H) 0.00 - 0.05 10*3/mm3    nRBC 0.3 (H) 0.0 - 0.2 /100 WBC   Urinalysis With Culture If Indicated - Urine, Catheter    Collection Time: 07/13/22  3:47 PM    Specimen: Urine, Catheter   Result Value Ref Range    Color, UA Yellow Yellow, Straw    Appearance, UA Cloudy (A) Clear    pH, UA <=5.0 5.0 - 8.0    Specific Gravity, UA 1.014 1.001 - 1.030    Glucose, UA Negative Negative    Ketones, UA Negative Negative    Bilirubin, UA Negative Negative    Blood, UA Moderate (2+) (A) Negative    Protein, UA >=300 mg/dL (3+) (A) Negative    Leuk Esterase, UA Negative Negative    Nitrite, UA Negative Negative    Urobilinogen, UA 0.2 E.U./dL 0.2 - 1.0 E.U./dL   COVID-19, FLU A/B, RSV PCR - Swab, Nasopharynx    Collection Time: 07/13/22  3:47 PM    Specimen: Nasopharynx; Swab   Result Value Ref Range    COVID19 Not Detected Not Detected - Ref. Range    Influenza A PCR Not Detected Not Detected    Influenza B PCR Not Detected Not Detected    RSV, PCR Not Detected Not Detected   Procalcitonin    Collection Time: 07/13/22  3:47 PM    Specimen: Blood   Result Value Ref Range    Procalcitonin 0.11 0.00 - 0.25 ng/mL   Lactic Acid, Plasma    Collection Time: 07/13/22  3:47 PM    Specimen: Blood   Result Value Ref Range    Lactate 1.0 0.5 - 2.0 mmol/L   Urinalysis, Microscopic Only - Urine, Catheter    Collection Time: 07/13/22  3:47 PM    Specimen: Urine,  Catheter   Result Value Ref Range    RBC, UA 7-12 (A) None Seen, 0-2 /HPF    WBC, UA 3-5 (A) None Seen, 0-2 /HPF    Bacteria, UA None Seen None Seen, Trace /HPF    Squamous Epithelial Cells, UA 3-6 (A) None Seen, 0-2 /HPF    Methodology Manual Light Microscopy    Troponin    Collection Time: 07/13/22  6:56 PM    Specimen: Blood   Result Value Ref Range    Troponin T 0.317 (C) 0.000 - 0.030 ng/mL     Note: In addition to lab results from this visit, the labs listed above may include labs taken at another facility or during a different encounter within the last 24 hours. Please correlate lab times with ED admission and discharge times for further clarification of the services performed during this visit.    CT Abdomen Pelvis Without Contrast   Final Result       1. No acute intra-abdominal or pelvic process.   2. Trace amount perihepatic ascites with trace to small right-sided   pleural effusion with diffuse anasarca the soft tissues likely related   to fluid overload.   3. Ancillary findings as described above.           This report was finalized on 7/13/2022 5:23 PM by Karen Rey MD.          XR Chest 1 View   Final Result   Mild pulmonary edema pattern with small bilateral pleural effusions.       This report was finalized on 7/13/2022 4:04 PM by Karen Rey MD.            Vitals:    07/13/22 1632 07/13/22 1831 07/13/22 1836 07/13/22 1902   BP: 150/68 141/69  145/78   BP Location:       Patient Position:       Pulse: 59  63 63   Resp:       Temp:       TempSrc:       SpO2: 94%  95% 95%   Weight:       Height:         Medications   sodium chloride 0.9 % flush 10 mL (has no administration in time range)     ECG/EMG Results (last 24 hours)     Procedure Component Value Units Date/Time    ECG 12 Lead [[id number]] Collected: 07/13/22 1853     Updated: 07/13/22 1853    ECG 12 Lead [[id number]] Collected: 07/13/22 1546     Updated: 07/13/22 1928     QT Interval 470 ms      QTC Interval 453 ms     Narrative:      Test  Reason : Weak/Dizzy/AMS protocol  Blood Pressure :   */*   mmHG  Vent. Rate :  56 BPM     Atrial Rate :  53 BPM     P-R Int :   * ms          QRS Dur :  90 ms      QT Int : 470 ms       P-R-T Axes :   * -50  74 degrees     QTc Int : 453 ms    Normal sinus rhythm  Low voltage QRS  Left anterior fascicular block  Cannot rule out Inferior infarct (masked by fascicular block?) , age undetermined  Cannot rule out Anterior infarct (cited on or before 15-APR-2021)  Abnormal ECG  Confirmed by MD Hooper Michael ([id number]) on 7/13/2022 7:28:52 PM    Referred By: BELLA           Confirmed By: Avtar Hooper MD        ECG 12 Lead   Preliminary Result         ECG 12 Lead   Final Result   Test Reason : Weak/Dizzy/AMS protocol   Blood Pressure :   */*   mmHG   Vent. Rate :  56 BPM     Atrial Rate :  53 BPM      P-R Int :   * ms          QRS Dur :  90 ms       QT Int : 470 ms       P-R-T Axes :   * -50  74 degrees      QTc Int : 453 ms      Normal sinus rhythm   Low voltage QRS   Left anterior fascicular block   Cannot rule out Inferior infarct (masked by fascicular block?) , age    undetermined   Cannot rule out Anterior infarct (cited on or before 15-APR-2021)   Abnormal ECG   Confirmed by MD Hooper Michael ([id number]) on 7/13/2022 7:28:52 PM      Referred By: BELLA           Confirmed By: Avtar Hooper MD                      MDM  Number of Diagnoses or Management Options  Chronic renal impairment, unspecified CKD stage: new and requires workup  Generalized weakness: new and requires workup     Amount and/or Complexity of Data Reviewed  Clinical lab tests: reviewed and ordered  Tests in the radiology section of CPT®: reviewed and ordered  Tests in the medicine section of CPT®: ordered and reviewed  Decide to obtain previous medical records or to obtain history from someone other than the patient: yes  Discuss the patient with other providers: yes    Patient Progress  Patient progress: stable      Final diagnoses:   Generalized weakness    Chronic renal impairment, unspecified CKD stage       ED Disposition  ED Disposition     ED Disposition   Discharge    Condition   Stable    Comment   --             Arleen Doherty MD  [address] [place] [place] [address] [phone]               Medication List      No changes were made to your prescriptions during this visit.          Trey Tobar PA  07/13/22 1175

## 2022-07-15 LAB
QT INTERVAL: 436 MS
QTC INTERVAL: 446 MS

## 2022-07-20 DIAGNOSIS — N18.32 STAGE 3B CHRONIC KIDNEY DISEASE: Primary | ICD-10-CM

## 2022-07-20 DIAGNOSIS — I50.42 CHRONIC COMBINED SYSTOLIC AND DIASTOLIC HEART FAILURE: ICD-10-CM

## 2022-07-20 RX ORDER — POTASSIUM CHLORIDE 750 MG/1
10 TABLET, FILM COATED, EXTENDED RELEASE ORAL DAILY
Qty: 30 TABLET | Refills: 1 | Status: SHIPPED | OUTPATIENT
Start: 2022-07-20 | End: 2022-09-03 | Stop reason: HOSPADM

## 2022-07-20 RX ORDER — BUMETANIDE 2 MG/1
2 TABLET ORAL DAILY
Qty: 60 TABLET | Refills: 1 | Status: ON HOLD | OUTPATIENT
Start: 2022-07-20 | End: 2022-08-12 | Stop reason: SDUPTHER

## 2022-07-20 NOTE — TELEPHONE ENCOUNTER
Called patient to check on her after her ED visit last week.  She reports she is feeling better, her main complaint is back spasm and lower extremity edema.  Renal function can significantly worsen the ED.  She confirms she is only taking Bumex once a day.  She notes chronic dyspnea, but is not aware of any worsening.  She does not check her weights at home.  I told her she could go ahead and double her Bumex for the next 2 to 3 days.  She is supposed to see her primary care on Friday and I have advised her to have repeat labs at that time.  Order placed.  If renal function remains significantly worse would consider nephrology consult.  Patient to follow-up with me next week.  She verbalized understanding with no further questions or concerns.

## 2022-07-26 ENCOUNTER — APPOINTMENT (OUTPATIENT)
Dept: GENERAL RADIOLOGY | Facility: HOSPITAL | Age: 82
End: 2022-07-26

## 2022-07-26 ENCOUNTER — APPOINTMENT (OUTPATIENT)
Dept: CT IMAGING | Facility: HOSPITAL | Age: 82
End: 2022-07-26

## 2022-07-26 ENCOUNTER — HOSPITAL ENCOUNTER (INPATIENT)
Facility: HOSPITAL | Age: 82
LOS: 16 days | Discharge: NURSING, REHAB OR LONG-TERM CARE | End: 2022-08-12
Attending: EMERGENCY MEDICINE | Admitting: INTERNAL MEDICINE

## 2022-07-26 DIAGNOSIS — R77.8 ELEVATED TROPONIN: ICD-10-CM

## 2022-07-26 DIAGNOSIS — Z79.4 TYPE 2 DIABETES MELLITUS WITH HYPERGLYCEMIA, WITH LONG-TERM CURRENT USE OF INSULIN: ICD-10-CM

## 2022-07-26 DIAGNOSIS — M62.838 MUSCLE SPASMS OF BOTH LOWER EXTREMITIES: ICD-10-CM

## 2022-07-26 DIAGNOSIS — R53.1 WEAKNESS: Primary | ICD-10-CM
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
## 2022-07-26 DIAGNOSIS — N18.9 CHRONIC RENAL FAILURE, UNSPECIFIED CKD STAGE: ICD-10-CM

## 2022-07-26 DIAGNOSIS — M86.071 ACUTE HEMATOGENOUS OSTEOMYELITIS OF RIGHT FOOT: ICD-10-CM

## 2022-07-26 DIAGNOSIS — R74.8 ELEVATED CK: ICD-10-CM

## 2022-07-26 DIAGNOSIS — E11.65 TYPE 2 DIABETES MELLITUS WITH HYPERGLYCEMIA, WITH LONG-TERM CURRENT USE OF INSULIN: ICD-10-CM

## 2022-07-26 DIAGNOSIS — M86.00 ACUTE HEMATOGENOUS OSTEOMYELITIS, UNSPECIFIED SITE: ICD-10-CM

## 2022-07-26 DIAGNOSIS — R73.9 HYPERGLYCEMIA: ICD-10-CM

## 2022-07-26 DIAGNOSIS — F41.1 GAD (GENERALIZED ANXIETY DISORDER): ICD-10-CM

## 2022-07-26 LAB
ALBUMIN SERPL-MCNC: 3.1 G/DL (ref 3.5–5.2)
ALBUMIN/GLOB SERPL: 1 G/DL
ALP SERPL-CCNC: 240 U/L (ref 39–117)
ALT SERPL W P-5'-P-CCNC: 37 U/L (ref 1–33)
ANION GAP SERPL CALCULATED.3IONS-SCNC: 12 MMOL/L (ref 5–15)
AST SERPL-CCNC: 39 U/L (ref 1–32)
BACTERIA UR QL AUTO: NORMAL /HPF
BILIRUB SERPL-MCNC: 1.5 MG/DL (ref 0–1.2)
BILIRUB UR QL STRIP: NEGATIVE
BUN SERPL-MCNC: 93 MG/DL (ref 8–23)
BUN/CREAT SERPL: 51.7 (ref 7–25)
CALCIUM SPEC-SCNC: 8.9 MG/DL (ref 8.6–10.5)
CHLORIDE SERPL-SCNC: 98 MMOL/L (ref 98–107)
CK SERPL-CCNC: 736 U/L (ref 20–180)
CLARITY UR: ABNORMAL
CO2 SERPL-SCNC: 27 MMOL/L (ref 22–29)
COLOR UR: YELLOW
CREAT SERPL-MCNC: 1.8 MG/DL (ref 0.57–1)
D-LACTATE SERPL-SCNC: 1.1 MMOL/L (ref 0.5–2)
EGFRCR SERPLBLD CKD-EPI 2021: 27.7 ML/MIN/1.73
FLUAV RNA RESP QL NAA+PROBE: NOT DETECTED
FLUBV RNA RESP QL NAA+PROBE: NOT DETECTED
GLOBULIN UR ELPH-MCNC: 3 GM/DL
GLUCOSE SERPL-MCNC: 323 MG/DL (ref 65–99)
GLUCOSE UR STRIP-MCNC: ABNORMAL MG/DL
HGB UR QL STRIP.AUTO: ABNORMAL
HYALINE CASTS UR QL AUTO: NORMAL /LPF
KETONES UR QL STRIP: NEGATIVE
LEUKOCYTE ESTERASE UR QL STRIP.AUTO: NEGATIVE
LIPASE SERPL-CCNC: 30 U/L (ref 13–60)
NITRITE UR QL STRIP: NEGATIVE
NT-PROBNP SERPL-MCNC: 1304 PG/ML (ref 0–1800)
PH UR STRIP.AUTO: <=5 [PH] (ref 5–8)
POTASSIUM SERPL-SCNC: 4.3 MMOL/L (ref 3.5–5.2)
PROCALCITONIN SERPL-MCNC: 0.09 NG/ML (ref 0–0.25)
PROT SERPL-MCNC: 6.1 G/DL (ref 6–8.5)
PROT UR QL STRIP: ABNORMAL
RBC # UR STRIP: NORMAL /HPF
REF LAB TEST METHOD: NORMAL
SARS-COV-2 RNA RESP QL NAA+PROBE: NOT DETECTED
SODIUM SERPL-SCNC: 137 MMOL/L (ref 136–145)
SP GR UR STRIP: 1.01 (ref 1–1.03)
SQUAMOUS #/AREA URNS HPF: NORMAL /HPF
TROPONIN T SERPL-MCNC: 0.44 NG/ML (ref 0–0.03)
UROBILINOGEN UR QL STRIP: ABNORMAL
WBC # UR STRIP: NORMAL /HPF

## 2022-07-26 PROCEDURE — 84443 ASSAY THYROID STIM HORMONE: CPT | Performed by: INTERNAL MEDICINE

## 2022-07-26 PROCEDURE — 83540 ASSAY OF IRON: CPT | Performed by: NURSE PRACTITIONER

## 2022-07-26 PROCEDURE — 72131 CT LUMBAR SPINE W/O DYE: CPT

## 2022-07-26 PROCEDURE — 70486 CT MAXILLOFACIAL W/O DYE: CPT

## 2022-07-26 PROCEDURE — 87636 SARSCOV2 & INF A&B AMP PRB: CPT | Performed by: EMERGENCY MEDICINE

## 2022-07-26 PROCEDURE — 99285 EMERGENCY DEPT VISIT HI MDM: CPT

## 2022-07-26 PROCEDURE — 73502 X-RAY EXAM HIP UNI 2-3 VIEWS: CPT

## 2022-07-26 PROCEDURE — 71045 X-RAY EXAM CHEST 1 VIEW: CPT

## 2022-07-26 PROCEDURE — 87150 DNA/RNA AMPLIFIED PROBE: CPT | Performed by: EMERGENCY MEDICINE

## 2022-07-26 PROCEDURE — 70450 CT HEAD/BRAIN W/O DYE: CPT

## 2022-07-26 PROCEDURE — 84484 ASSAY OF TROPONIN QUANT: CPT | Performed by: EMERGENCY MEDICINE

## 2022-07-26 PROCEDURE — 87077 CULTURE AEROBIC IDENTIFY: CPT | Performed by: EMERGENCY MEDICINE

## 2022-07-26 PROCEDURE — 85025 COMPLETE CBC W/AUTO DIFF WBC: CPT | Performed by: EMERGENCY MEDICINE

## 2022-07-26 PROCEDURE — 84145 PROCALCITONIN (PCT): CPT | Performed by: EMERGENCY MEDICINE

## 2022-07-26 PROCEDURE — 83880 ASSAY OF NATRIURETIC PEPTIDE: CPT | Performed by: EMERGENCY MEDICINE

## 2022-07-26 PROCEDURE — 82248 BILIRUBIN DIRECT: CPT | Performed by: NURSE PRACTITIONER

## 2022-07-26 PROCEDURE — 81001 URINALYSIS AUTO W/SCOPE: CPT | Performed by: EMERGENCY MEDICINE

## 2022-07-26 PROCEDURE — 84466 ASSAY OF TRANSFERRIN: CPT | Performed by: NURSE PRACTITIONER

## 2022-07-26 PROCEDURE — 83036 HEMOGLOBIN GLYCOSYLATED A1C: CPT | Performed by: INTERNAL MEDICINE

## 2022-07-26 PROCEDURE — 82247 BILIRUBIN TOTAL: CPT | Performed by: NURSE PRACTITIONER

## 2022-07-26 PROCEDURE — 82728 ASSAY OF FERRITIN: CPT | Performed by: NURSE PRACTITIONER

## 2022-07-26 PROCEDURE — 73560 X-RAY EXAM OF KNEE 1 OR 2: CPT

## 2022-07-26 PROCEDURE — 74176 CT ABD & PELVIS W/O CONTRAST: CPT

## 2022-07-26 PROCEDURE — 85007 BL SMEAR W/DIFF WBC COUNT: CPT | Performed by: EMERGENCY MEDICINE

## 2022-07-26 PROCEDURE — 87186 SC STD MICRODIL/AGAR DIL: CPT | Performed by: EMERGENCY MEDICINE

## 2022-07-26 PROCEDURE — 87040 BLOOD CULTURE FOR BACTERIA: CPT | Performed by: EMERGENCY MEDICINE

## 2022-07-26 PROCEDURE — P9612 CATHETERIZE FOR URINE SPEC: HCPCS

## 2022-07-26 PROCEDURE — 83605 ASSAY OF LACTIC ACID: CPT | Performed by: EMERGENCY MEDICINE

## 2022-07-26 PROCEDURE — 80053 COMPREHEN METABOLIC PANEL: CPT | Performed by: EMERGENCY MEDICINE

## 2022-07-26 PROCEDURE — 25010000002 MORPHINE PER 10 MG: Performed by: EMERGENCY MEDICINE

## 2022-07-26 PROCEDURE — 36415 COLL VENOUS BLD VENIPUNCTURE: CPT

## 2022-07-26 PROCEDURE — 93005 ELECTROCARDIOGRAM TRACING: CPT | Performed by: EMERGENCY MEDICINE

## 2022-07-26 PROCEDURE — 73070 X-RAY EXAM OF ELBOW: CPT

## 2022-07-26 PROCEDURE — 25010000002 HYDROMORPHONE 1 MG/ML SOLUTION: Performed by: EMERGENCY MEDICINE

## 2022-07-26 PROCEDURE — 83690 ASSAY OF LIPASE: CPT | Performed by: EMERGENCY MEDICINE

## 2022-07-26 PROCEDURE — 82550 ASSAY OF CK (CPK): CPT | Performed by: EMERGENCY MEDICINE

## 2022-07-26 RX ORDER — MORPHINE SULFATE 4 MG/ML
4 INJECTION, SOLUTION INTRAMUSCULAR; INTRAVENOUS ONCE
Status: COMPLETED | OUTPATIENT
Start: 2022-07-26 | End: 2022-07-26

## 2022-07-26 RX ORDER — SODIUM CHLORIDE 0.9 % (FLUSH) 0.9 %
10 SYRINGE (ML) INJECTION AS NEEDED
Status: DISCONTINUED | OUTPATIENT
Start: 2022-07-26 | End: 2022-08-12 | Stop reason: HOSPADM

## 2022-07-26 RX ADMIN — HYDROMORPHONE HYDROCHLORIDE 1 MG: 1 INJECTION, SOLUTION INTRAMUSCULAR; INTRAVENOUS; SUBCUTANEOUS at 23:37

## 2022-07-26 RX ADMIN — MORPHINE SULFATE 4 MG: 4 INJECTION, SOLUTION INTRAMUSCULAR; INTRAVENOUS at 23:08

## 2022-07-27 PROBLEM — R79.89 ELEVATED SERUM CREATININE: Status: ACTIVE | Noted: 2022-07-27

## 2022-07-27 PROBLEM — R53.1 WEAKNESS: Status: ACTIVE | Noted: 2022-07-27

## 2022-07-27 PROBLEM — D64.9 ANEMIA: Status: ACTIVE | Noted: 2022-07-27

## 2022-07-27 PROBLEM — D33.3 ACOUSTIC NEUROMA: Status: ACTIVE | Noted: 2022-07-27

## 2022-07-27 PROBLEM — R42 DIZZINESS: Status: ACTIVE | Noted: 2022-07-27

## 2022-07-27 PROBLEM — R77.8 ELEVATED TROPONIN: Status: ACTIVE | Noted: 2022-07-27

## 2022-07-27 PROBLEM — I50.9 CHF EXACERBATION: Status: ACTIVE | Noted: 2022-07-27

## 2022-07-27 PROBLEM — R79.89 ELEVATED TROPONIN: Status: ACTIVE | Noted: 2022-07-27
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
## 2022-07-27 LAB
ALBUMIN SERPL-MCNC: 3.2 G/DL (ref 3.5–5.2)
ALBUMIN/GLOB SERPL: 1 G/DL
ALP SERPL-CCNC: 234 U/L (ref 39–117)
ALT SERPL W P-5'-P-CCNC: 36 U/L (ref 1–33)
ANION GAP SERPL CALCULATED.3IONS-SCNC: 10 MMOL/L (ref 5–15)
AST SERPL-CCNC: 42 U/L (ref 1–32)
BACTERIA BLD CULT: ABNORMAL
BACTERIA ID TEST ISLT QL CULT: ABNORMAL
BASOPHILS # BLD MANUAL: 0 10*3/MM3 (ref 0–0.2)
BASOPHILS # BLD MANUAL: 0.06 10*3/MM3 (ref 0–0.2)
BASOPHILS NFR BLD MANUAL: 0 % (ref 0–1.5)
BASOPHILS NFR BLD MANUAL: 1 % (ref 0–1.5)
BILIRUB CONJ SERPL-MCNC: 0.3 MG/DL (ref 0–0.3)
BILIRUB INDIRECT SERPL-MCNC: 1.2 MG/DL
BILIRUB SERPL-MCNC: 1.5 MG/DL (ref 0–1.2)
BILIRUB SERPL-MCNC: 1.5 MG/DL (ref 0–1.2)
BUN SERPL-MCNC: 92 MG/DL (ref 8–23)
BUN/CREAT SERPL: 53.8 (ref 7–25)
BURR CELLS BLD QL SMEAR: NORMAL
CALCIUM SPEC-SCNC: 8.8 MG/DL (ref 8.6–10.5)
CHLORIDE SERPL-SCNC: 95 MMOL/L (ref 98–107)
CK SERPL-CCNC: 720 U/L (ref 20–180)
CO2 SERPL-SCNC: 27 MMOL/L (ref 22–29)
CREAT SERPL-MCNC: 1.71 MG/DL (ref 0.57–1)
DEPRECATED RDW RBC AUTO: 51.5 FL (ref 37–54)
DEPRECATED RDW RBC AUTO: 52 FL (ref 37–54)
EGFRCR SERPLBLD CKD-EPI 2021: 29.4 ML/MIN/1.73
EOSINOPHIL # BLD MANUAL: 0 10*3/MM3 (ref 0–0.4)
EOSINOPHIL # BLD MANUAL: 0 10*3/MM3 (ref 0–0.4)
EOSINOPHIL NFR BLD MANUAL: 0 % (ref 0.3–6.2)
EOSINOPHIL NFR BLD MANUAL: 0 % (ref 0.3–6.2)
ERYTHROCYTE [DISTWIDTH] IN BLOOD BY AUTOMATED COUNT: 16.3 % (ref 12.3–15.4)
ERYTHROCYTE [DISTWIDTH] IN BLOOD BY AUTOMATED COUNT: 16.3 % (ref 12.3–15.4)
FERRITIN SERPL-MCNC: 40.61 NG/ML (ref 13–150)
FOLATE SERPL-MCNC: 13.8 NG/ML (ref 4.78–24.2)
GLOBULIN UR ELPH-MCNC: 3.1 GM/DL
GLUCOSE BLDC GLUCOMTR-MCNC: 185 MG/DL (ref 70–130)
GLUCOSE BLDC GLUCOMTR-MCNC: 278 MG/DL (ref 70–130)
GLUCOSE BLDC GLUCOMTR-MCNC: 290 MG/DL (ref 70–130)
GLUCOSE BLDC GLUCOMTR-MCNC: 308 MG/DL (ref 70–130)
GLUCOSE BLDC GLUCOMTR-MCNC: 424 MG/DL (ref 70–130)
GLUCOSE SERPL-MCNC: 300 MG/DL (ref 65–99)
HBA1C MFR BLD: 9 % (ref 4.8–5.6)
HCT VFR BLD AUTO: 30.1 % (ref 34–46.6)
HCT VFR BLD AUTO: 30.4 % (ref 34–46.6)
HGB BLD-MCNC: 10.1 G/DL (ref 12–15.9)
HGB BLD-MCNC: 10.3 G/DL (ref 12–15.9)
IRON 24H UR-MRATE: 48 MCG/DL (ref 37–145)
IRON SATN MFR SERPL: 12 % (ref 20–50)
LYMPHOCYTES # BLD MANUAL: 0.68 10*3/MM3 (ref 0.7–3.1)
LYMPHOCYTES # BLD MANUAL: 0.69 10*3/MM3 (ref 0.7–3.1)
LYMPHOCYTES NFR BLD MANUAL: 4 % (ref 5–12)
LYMPHOCYTES NFR BLD MANUAL: 4 % (ref 5–12)
MAGNESIUM SERPL-MCNC: 2.1 MG/DL (ref 1.6–2.4)
MCH RBC QN AUTO: 29.6 PG (ref 26.6–33)
MCH RBC QN AUTO: 30 PG (ref 26.6–33)
MCHC RBC AUTO-ENTMCNC: 33.2 G/DL (ref 31.5–35.7)
MCHC RBC AUTO-ENTMCNC: 34.2 G/DL (ref 31.5–35.7)
MCV RBC AUTO: 87.8 FL (ref 79–97)
MCV RBC AUTO: 89.1 FL (ref 79–97)
METAMYELOCYTES NFR BLD MANUAL: 1 % (ref 0–0)
METAMYELOCYTES NFR BLD MANUAL: 1 % (ref 0–0)
MONOCYTES # BLD: 0.23 10*3/MM3 (ref 0.1–0.9)
MONOCYTES # BLD: 0.25 10*3/MM3 (ref 0.1–0.9)
NEUTROPHILS # BLD AUTO: 4.71 10*3/MM3 (ref 1.7–7)
NEUTROPHILS # BLD AUTO: 5.22 10*3/MM3 (ref 1.7–7)
NEUTROPHILS NFR BLD MANUAL: 81 % (ref 42.7–76)
NEUTROPHILS NFR BLD MANUAL: 83 % (ref 42.7–76)
NEUTS BAND NFR BLD MANUAL: 2 % (ref 0–5)
PHOSPHATE SERPL-MCNC: 6.1 MG/DL (ref 2.5–4.5)
PLAT MORPH BLD: NORMAL
PLAT MORPH BLD: NORMAL
PLATELET # BLD AUTO: 175 10*3/MM3 (ref 140–450)
PLATELET # BLD AUTO: 216 10*3/MM3 (ref 140–450)
PMV BLD AUTO: 13 FL (ref 6–12)
PMV BLD AUTO: 13.8 FL (ref 6–12)
POTASSIUM SERPL-SCNC: 4.2 MMOL/L (ref 3.5–5.2)
PROT SERPL-MCNC: 6.3 G/DL (ref 6–8.5)
QT INTERVAL: 446 MS
QT INTERVAL: 502 MS
QTC INTERVAL: 434 MS
QTC INTERVAL: 446 MS
RBC # BLD AUTO: 3.41 10*6/MM3 (ref 3.77–5.28)
RBC # BLD AUTO: 3.43 10*6/MM3 (ref 3.77–5.28)
RBC MORPH BLD: NORMAL
RBC MORPH BLD: NORMAL
RETICS # AUTO: 0.1 10*6/MM3 (ref 0.02–0.13)
RETICS/RBC NFR AUTO: 2.75 % (ref 0.7–1.9)
SCAN SLIDE: NORMAL
SCAN SLIDE: NORMAL
SODIUM SERPL-SCNC: 132 MMOL/L (ref 136–145)
TIBC SERPL-MCNC: 393 MCG/DL (ref 298–536)
TRANSFERRIN SERPL-MCNC: 264 MG/DL (ref 200–360)
TROPONIN T SERPL-MCNC: 0.33 NG/ML (ref 0–0.03)
TROPONIN T SERPL-MCNC: 0.37 NG/ML (ref 0–0.03)
TSH SERPL DL<=0.05 MIU/L-ACNC: 3.43 UIU/ML (ref 0.27–4.2)
TSH SERPL DL<=0.05 MIU/L-ACNC: 5.59 UIU/ML (ref 0.27–4.2)
VARIANT LYMPHS NFR BLD MANUAL: 11 % (ref 19.6–45.3)
VARIANT LYMPHS NFR BLD MANUAL: 12 % (ref 19.6–45.3)
VIT B12 BLD-MCNC: >2000 PG/ML (ref 211–946)
WBC MORPH BLD: NORMAL
WBC MORPH BLD: NORMAL
WBC NRBC COR # BLD: 5.68 10*3/MM3 (ref 3.4–10.8)
WBC NRBC COR # BLD: 6.29 10*3/MM3 (ref 3.4–10.8)

## 2022-07-27 PROCEDURE — 82550 ASSAY OF CK (CPK): CPT | Performed by: INTERNAL MEDICINE

## 2022-07-27 PROCEDURE — 99222 1ST HOSP IP/OBS MODERATE 55: CPT | Performed by: INTERNAL MEDICINE

## 2022-07-27 PROCEDURE — 63710000001 INSULIN DETEMIR PER 5 UNITS: Performed by: STUDENT IN AN ORGANIZED HEALTH CARE EDUCATION/TRAINING PROGRAM

## 2022-07-27 PROCEDURE — 97165 OT EVAL LOW COMPLEX 30 MIN: CPT

## 2022-07-27 PROCEDURE — 84443 ASSAY THYROID STIM HORMONE: CPT | Performed by: INTERNAL MEDICINE

## 2022-07-27 PROCEDURE — 84100 ASSAY OF PHOSPHORUS: CPT | Performed by: INTERNAL MEDICINE

## 2022-07-27 PROCEDURE — 25010000002 HEPARIN (PORCINE) PER 1000 UNITS: Performed by: INTERNAL MEDICINE

## 2022-07-27 PROCEDURE — 84484 ASSAY OF TROPONIN QUANT: CPT | Performed by: INTERNAL MEDICINE

## 2022-07-27 PROCEDURE — 80053 COMPREHEN METABOLIC PANEL: CPT | Performed by: INTERNAL MEDICINE

## 2022-07-27 PROCEDURE — 85045 AUTOMATED RETICULOCYTE COUNT: CPT | Performed by: NURSE PRACTITIONER

## 2022-07-27 PROCEDURE — 85025 COMPLETE CBC W/AUTO DIFF WBC: CPT | Performed by: INTERNAL MEDICINE

## 2022-07-27 PROCEDURE — 97161 PT EVAL LOW COMPLEX 20 MIN: CPT

## 2022-07-27 PROCEDURE — 93005 ELECTROCARDIOGRAM TRACING: CPT | Performed by: STUDENT IN AN ORGANIZED HEALTH CARE EDUCATION/TRAINING PROGRAM

## 2022-07-27 PROCEDURE — 93010 ELECTROCARDIOGRAM REPORT: CPT | Performed by: INTERNAL MEDICINE

## 2022-07-27 PROCEDURE — 63710000001 INSULIN LISPRO (HUMAN) PER 5 UNITS: Performed by: INTERNAL MEDICINE

## 2022-07-27 PROCEDURE — 82607 VITAMIN B-12: CPT | Performed by: NURSE PRACTITIONER

## 2022-07-27 PROCEDURE — 85007 BL SMEAR W/DIFF WBC COUNT: CPT | Performed by: INTERNAL MEDICINE

## 2022-07-27 PROCEDURE — 82746 ASSAY OF FOLIC ACID SERUM: CPT | Performed by: NURSE PRACTITIONER

## 2022-07-27 PROCEDURE — 97530 THERAPEUTIC ACTIVITIES: CPT

## 2022-07-27 PROCEDURE — 25010000002 VANCOMYCIN 10 G RECONSTITUTED SOLUTION

## 2022-07-27 PROCEDURE — 93005 ELECTROCARDIOGRAM TRACING: CPT | Performed by: INTERNAL MEDICINE

## 2022-07-27 PROCEDURE — 82962 GLUCOSE BLOOD TEST: CPT

## 2022-07-27 PROCEDURE — 63710000001 INSULIN DETEMIR PER 5 UNITS: Performed by: INTERNAL MEDICINE

## 2022-07-27 PROCEDURE — 25010000002 FUROSEMIDE PER 20 MG: Performed by: INTERNAL MEDICINE

## 2022-07-27 PROCEDURE — 83735 ASSAY OF MAGNESIUM: CPT | Performed by: INTERNAL MEDICINE

## 2022-07-27 PROCEDURE — 84484 ASSAY OF TROPONIN QUANT: CPT | Performed by: NURSE PRACTITIONER

## 2022-07-27 RX ORDER — ASPIRIN 81 MG/1
81 TABLET ORAL DAILY
Status: DISCONTINUED | OUTPATIENT
Start: 2022-07-27 | End: 2022-08-12 | Stop reason: HOSPADM

## 2022-07-27 RX ORDER — ACETAMINOPHEN 160 MG/5ML
650 SOLUTION ORAL EVERY 4 HOURS PRN
Status: DISCONTINUED | OUTPATIENT
Start: 2022-07-27 | End: 2022-08-03

## 2022-07-27 RX ORDER — NICOTINE POLACRILEX 4 MG
15 LOZENGE BUCCAL
Status: DISCONTINUED | OUTPATIENT
Start: 2022-07-27 | End: 2022-08-12 | Stop reason: HOSPADM

## 2022-07-27 RX ORDER — DEXTROSE MONOHYDRATE 25 G/50ML
25 INJECTION, SOLUTION INTRAVENOUS
Status: DISCONTINUED | OUTPATIENT
Start: 2022-07-27 | End: 2022-08-12 | Stop reason: HOSPADM

## 2022-07-27 RX ORDER — LIDOCAINE 50 MG/G
1 PATCH TOPICAL
Status: DISCONTINUED | OUTPATIENT
Start: 2022-07-27 | End: 2022-08-12 | Stop reason: HOSPADM

## 2022-07-27 RX ORDER — ACETAMINOPHEN 650 MG/1
650 SUPPOSITORY RECTAL EVERY 4 HOURS PRN
Status: DISCONTINUED | OUTPATIENT
Start: 2022-07-27 | End: 2022-08-03

## 2022-07-27 RX ORDER — ALLOPURINOL 300 MG/1
300 TABLET ORAL DAILY
Status: DISCONTINUED | OUTPATIENT
Start: 2022-07-27 | End: 2022-08-12 | Stop reason: HOSPADM

## 2022-07-27 RX ORDER — PREGABALIN 50 MG/1
50 CAPSULE ORAL EVERY 12 HOURS SCHEDULED
Status: DISCONTINUED | OUTPATIENT
Start: 2022-07-27 | End: 2022-08-12 | Stop reason: HOSPADM

## 2022-07-27 RX ORDER — CASTOR OIL AND BALSAM, PERU 788; 87 MG/G; MG/G
1 OINTMENT TOPICAL EVERY 12 HOURS SCHEDULED
Status: DISCONTINUED | OUTPATIENT
Start: 2022-07-27 | End: 2022-08-12 | Stop reason: HOSPADM

## 2022-07-27 RX ORDER — UBIDECARENONE 75 MG
100 CAPSULE ORAL DAILY
Status: DISCONTINUED | OUTPATIENT
Start: 2022-07-27 | End: 2022-08-12 | Stop reason: HOSPADM

## 2022-07-27 RX ORDER — SODIUM CHLORIDE 0.9 % (FLUSH) 0.9 %
10 SYRINGE (ML) INJECTION EVERY 12 HOURS SCHEDULED
Status: DISCONTINUED | OUTPATIENT
Start: 2022-07-27 | End: 2022-08-12 | Stop reason: HOSPADM

## 2022-07-27 RX ORDER — ISOSORBIDE DINITRATE 20 MG/1
20 TABLET ORAL 2 TIMES DAILY
Status: DISCONTINUED | OUTPATIENT
Start: 2022-07-27 | End: 2022-08-12 | Stop reason: HOSPADM

## 2022-07-27 RX ORDER — SODIUM CHLORIDE 9 MG/ML
75 INJECTION, SOLUTION INTRAVENOUS CONTINUOUS
Status: ACTIVE | OUTPATIENT
Start: 2022-07-27 | End: 2022-07-27

## 2022-07-27 RX ORDER — ONDANSETRON 2 MG/ML
4 INJECTION INTRAMUSCULAR; INTRAVENOUS EVERY 6 HOURS PRN
Status: DISCONTINUED | OUTPATIENT
Start: 2022-07-27 | End: 2022-08-12 | Stop reason: HOSPADM

## 2022-07-27 RX ORDER — ACETAMINOPHEN 325 MG/1
650 TABLET ORAL EVERY 4 HOURS PRN
Status: DISCONTINUED | OUTPATIENT
Start: 2022-07-27 | End: 2022-08-12 | Stop reason: HOSPADM

## 2022-07-27 RX ORDER — AMLODIPINE BESYLATE 5 MG/1
5 TABLET ORAL DAILY
Status: DISCONTINUED | OUTPATIENT
Start: 2022-07-27 | End: 2022-08-10

## 2022-07-27 RX ORDER — CARVEDILOL 12.5 MG/1
12.5 TABLET ORAL 2 TIMES DAILY
Status: DISCONTINUED | OUTPATIENT
Start: 2022-07-27 | End: 2022-07-27

## 2022-07-27 RX ORDER — INSULIN LISPRO 100 [IU]/ML
0-9 INJECTION, SOLUTION INTRAVENOUS; SUBCUTANEOUS
Status: DISCONTINUED | OUTPATIENT
Start: 2022-07-27 | End: 2022-08-01

## 2022-07-27 RX ORDER — ATORVASTATIN CALCIUM 40 MG/1
80 TABLET, FILM COATED ORAL NIGHTLY
Status: DISCONTINUED | OUTPATIENT
Start: 2022-07-27 | End: 2022-07-29

## 2022-07-27 RX ORDER — HEPARIN SODIUM 5000 [USP'U]/ML
5000 INJECTION, SOLUTION INTRAVENOUS; SUBCUTANEOUS EVERY 12 HOURS SCHEDULED
Status: DISCONTINUED | OUTPATIENT
Start: 2022-07-27 | End: 2022-08-12 | Stop reason: HOSPADM

## 2022-07-27 RX ORDER — ONDANSETRON 4 MG/1
4 TABLET, FILM COATED ORAL EVERY 6 HOURS PRN
Status: DISCONTINUED | OUTPATIENT
Start: 2022-07-27 | End: 2022-08-12 | Stop reason: HOSPADM

## 2022-07-27 RX ORDER — CHOLECALCIFEROL (VITAMIN D3) 125 MCG
5 CAPSULE ORAL NIGHTLY PRN
Status: DISCONTINUED | OUTPATIENT
Start: 2022-07-27 | End: 2022-07-30

## 2022-07-27 RX ORDER — SODIUM CHLORIDE 0.9 % (FLUSH) 0.9 %
10 SYRINGE (ML) INJECTION AS NEEDED
Status: DISCONTINUED | OUTPATIENT
Start: 2022-07-27 | End: 2022-08-12 | Stop reason: HOSPADM

## 2022-07-27 RX ORDER — OXYCODONE HYDROCHLORIDE 5 MG/1
5 TABLET ORAL EVERY 4 HOURS PRN
Status: DISCONTINUED | OUTPATIENT
Start: 2022-07-27 | End: 2022-07-29

## 2022-07-27 RX ORDER — FUROSEMIDE 10 MG/ML
40 INJECTION INTRAMUSCULAR; INTRAVENOUS ONCE
Status: COMPLETED | OUTPATIENT
Start: 2022-07-27 | End: 2022-07-27

## 2022-07-27 RX ORDER — ACETAMINOPHEN 500 MG
1000 TABLET ORAL 3 TIMES DAILY
Status: COMPLETED | OUTPATIENT
Start: 2022-07-27 | End: 2022-07-28

## 2022-07-27 RX ADMIN — ALLOPURINOL 300 MG: 300 TABLET ORAL at 09:18

## 2022-07-27 RX ADMIN — HEPARIN SODIUM 5000 UNITS: 5000 INJECTION INTRAVENOUS; SUBCUTANEOUS at 19:58

## 2022-07-27 RX ADMIN — PREGABALIN 50 MG: 50 CAPSULE ORAL at 19:58

## 2022-07-27 RX ADMIN — HEPARIN SODIUM 5000 UNITS: 5000 INJECTION INTRAVENOUS; SUBCUTANEOUS at 02:00

## 2022-07-27 RX ADMIN — INSULIN DETEMIR 15 UNITS: 100 INJECTION, SOLUTION SUBCUTANEOUS at 09:29

## 2022-07-27 RX ADMIN — ACETAMINOPHEN 1000 MG: 500 TABLET, FILM COATED ORAL at 09:18

## 2022-07-27 RX ADMIN — ATORVASTATIN CALCIUM 80 MG: 40 TABLET, FILM COATED ORAL at 19:57

## 2022-07-27 RX ADMIN — Medication 5 MG: at 19:58

## 2022-07-27 RX ADMIN — LIDOCAINE 1 PATCH: 50 PATCH CUTANEOUS at 09:18

## 2022-07-27 RX ADMIN — AMLODIPINE BESYLATE 5 MG: 5 TABLET ORAL at 09:18

## 2022-07-27 RX ADMIN — ACETAMINOPHEN 1000 MG: 500 TABLET, FILM COATED ORAL at 15:06

## 2022-07-27 RX ADMIN — INSULIN LISPRO 6 UNITS: 100 INJECTION, SOLUTION INTRAVENOUS; SUBCUTANEOUS at 09:18

## 2022-07-27 RX ADMIN — INSULIN DETEMIR 10 UNITS: 100 INJECTION, SOLUTION SUBCUTANEOUS at 12:03

## 2022-07-27 RX ADMIN — SODIUM CHLORIDE 75 ML/HR: 9 INJECTION, SOLUTION INTRAVENOUS at 01:57

## 2022-07-27 RX ADMIN — CASTOR OIL AND BALSAM, PERU 1 APPLICATION: 788; 87 OINTMENT TOPICAL at 17:14

## 2022-07-27 RX ADMIN — Medication 10 ML: at 19:59

## 2022-07-27 RX ADMIN — VITAM B12 100 MCG: 100 TAB at 09:18

## 2022-07-27 RX ADMIN — VANCOMYCIN HYDROCHLORIDE 1750 MG: 10 INJECTION, POWDER, LYOPHILIZED, FOR SOLUTION INTRAVENOUS at 20:19

## 2022-07-27 RX ADMIN — CASTOR OIL AND BALSAM, PERU 1 APPLICATION: 788; 87 OINTMENT TOPICAL at 21:58

## 2022-07-27 RX ADMIN — HEPARIN SODIUM 5000 UNITS: 5000 INJECTION INTRAVENOUS; SUBCUTANEOUS at 09:25

## 2022-07-27 RX ADMIN — CARVEDILOL 12.5 MG: 12.5 TABLET, FILM COATED ORAL at 09:18

## 2022-07-27 RX ADMIN — ACETAMINOPHEN 1000 MG: 500 TABLET, FILM COATED ORAL at 19:57

## 2022-07-27 RX ADMIN — ISOSORBIDE DINITRATE 20 MG: 20 TABLET ORAL at 09:18

## 2022-07-27 RX ADMIN — INSULIN LISPRO 7 UNITS: 100 INJECTION, SOLUTION INTRAVENOUS; SUBCUTANEOUS at 17:14

## 2022-07-27 RX ADMIN — PREGABALIN 50 MG: 50 CAPSULE ORAL at 09:18

## 2022-07-27 RX ADMIN — ASPIRIN 81 MG: 81 TABLET, COATED ORAL at 09:18

## 2022-07-27 RX ADMIN — INSULIN LISPRO 2 UNITS: 100 INJECTION, SOLUTION INTRAVENOUS; SUBCUTANEOUS at 21:58

## 2022-07-27 RX ADMIN — INSULIN LISPRO 9 UNITS: 100 INJECTION, SOLUTION INTRAVENOUS; SUBCUTANEOUS at 11:59

## 2022-07-27 RX ADMIN — OXYCODONE 5 MG: 5 TABLET ORAL at 19:58

## 2022-07-27 RX ADMIN — FUROSEMIDE 40 MG: 10 INJECTION, SOLUTION INTRAMUSCULAR; INTRAVENOUS at 03:32

## 2022-07-28 LAB
ANION GAP SERPL CALCULATED.3IONS-SCNC: 10 MMOL/L (ref 5–15)
BUN SERPL-MCNC: 91 MG/DL (ref 8–23)
BUN/CREAT SERPL: 54.2 (ref 7–25)
CALCIUM SPEC-SCNC: 8.5 MG/DL (ref 8.6–10.5)
CHLORIDE SERPL-SCNC: 97 MMOL/L (ref 98–107)
CO2 SERPL-SCNC: 28 MMOL/L (ref 22–29)
CREAT SERPL-MCNC: 1.68 MG/DL (ref 0.57–1)
EGFRCR SERPLBLD CKD-EPI 2021: 30.1 ML/MIN/1.73
GLUCOSE BLDC GLUCOMTR-MCNC: 123 MG/DL (ref 70–130)
GLUCOSE BLDC GLUCOMTR-MCNC: 190 MG/DL (ref 70–130)
GLUCOSE BLDC GLUCOMTR-MCNC: 191 MG/DL (ref 70–130)
GLUCOSE BLDC GLUCOMTR-MCNC: 230 MG/DL (ref 70–130)
GLUCOSE SERPL-MCNC: 113 MG/DL (ref 65–99)
POTASSIUM SERPL-SCNC: 4.2 MMOL/L (ref 3.5–5.2)
SODIUM SERPL-SCNC: 135 MMOL/L (ref 136–145)
VANCOMYCIN SERPL-MCNC: 15.4 MCG/ML (ref 5–40)

## 2022-07-28 PROCEDURE — 25010000002 VANCOMYCIN PER 500 MG

## 2022-07-28 PROCEDURE — 80048 BASIC METABOLIC PNL TOTAL CA: CPT | Performed by: STUDENT IN AN ORGANIZED HEALTH CARE EDUCATION/TRAINING PROGRAM

## 2022-07-28 PROCEDURE — 97164 PT RE-EVAL EST PLAN CARE: CPT

## 2022-07-28 PROCEDURE — 82962 GLUCOSE BLOOD TEST: CPT

## 2022-07-28 PROCEDURE — 25010000002 HEPARIN (PORCINE) PER 1000 UNITS: Performed by: INTERNAL MEDICINE

## 2022-07-28 PROCEDURE — 99232 SBSQ HOSP IP/OBS MODERATE 35: CPT | Performed by: STUDENT IN AN ORGANIZED HEALTH CARE EDUCATION/TRAINING PROGRAM

## 2022-07-28 PROCEDURE — 63710000001 INSULIN LISPRO (HUMAN) PER 5 UNITS: Performed by: INTERNAL MEDICINE

## 2022-07-28 PROCEDURE — 63710000001 INSULIN DETEMIR PER 5 UNITS: Performed by: STUDENT IN AN ORGANIZED HEALTH CARE EDUCATION/TRAINING PROGRAM

## 2022-07-28 PROCEDURE — 25010000002 ONDANSETRON PER 1 MG: Performed by: INTERNAL MEDICINE

## 2022-07-28 PROCEDURE — 99232 SBSQ HOSP IP/OBS MODERATE 35: CPT | Performed by: INTERNAL MEDICINE

## 2022-07-28 PROCEDURE — 29581 APPL MULTLAYER CMPRN SYS LEG: CPT

## 2022-07-28 PROCEDURE — 80202 ASSAY OF VANCOMYCIN: CPT

## 2022-07-28 RX ORDER — BUMETANIDE 1 MG/1
1 TABLET ORAL DAILY
Status: DISCONTINUED | OUTPATIENT
Start: 2022-07-28 | End: 2022-08-03

## 2022-07-28 RX ORDER — VANCOMYCIN HYDROCHLORIDE 1 G/200ML
10 INJECTION, SOLUTION INTRAVENOUS ONCE
Status: COMPLETED | OUTPATIENT
Start: 2022-07-28 | End: 2022-07-28

## 2022-07-28 RX ORDER — GUAIFENESIN AND DEXTROMETHORPHAN HYDROBROMIDE 600; 30 MG/1; MG/1
1 TABLET, EXTENDED RELEASE ORAL 2 TIMES DAILY PRN
Status: DISCONTINUED | OUTPATIENT
Start: 2022-07-28 | End: 2022-08-03

## 2022-07-28 RX ADMIN — ASPIRIN 81 MG: 81 TABLET, COATED ORAL at 09:08

## 2022-07-28 RX ADMIN — INSULIN LISPRO 2 UNITS: 100 INJECTION, SOLUTION INTRAVENOUS; SUBCUTANEOUS at 17:07

## 2022-07-28 RX ADMIN — INSULIN LISPRO 2 UNITS: 100 INJECTION, SOLUTION INTRAVENOUS; SUBCUTANEOUS at 21:05

## 2022-07-28 RX ADMIN — CASTOR OIL AND BALSAM, PERU 1 APPLICATION: 788; 87 OINTMENT TOPICAL at 21:36

## 2022-07-28 RX ADMIN — PREGABALIN 50 MG: 50 CAPSULE ORAL at 21:05

## 2022-07-28 RX ADMIN — HEPARIN SODIUM 5000 UNITS: 5000 INJECTION INTRAVENOUS; SUBCUTANEOUS at 09:08

## 2022-07-28 RX ADMIN — DICLOFENAC 2 G: 10 GEL TOPICAL at 11:09

## 2022-07-28 RX ADMIN — INSULIN LISPRO 4 UNITS: 100 INJECTION, SOLUTION INTRAVENOUS; SUBCUTANEOUS at 11:51

## 2022-07-28 RX ADMIN — PREGABALIN 50 MG: 50 CAPSULE ORAL at 09:08

## 2022-07-28 RX ADMIN — ISOSORBIDE DINITRATE 20 MG: 20 TABLET ORAL at 21:05

## 2022-07-28 RX ADMIN — DICLOFENAC 2 G: 10 GEL TOPICAL at 17:07

## 2022-07-28 RX ADMIN — LIDOCAINE 1 PATCH: 50 PATCH CUTANEOUS at 09:09

## 2022-07-28 RX ADMIN — HEPARIN SODIUM 5000 UNITS: 5000 INJECTION INTRAVENOUS; SUBCUTANEOUS at 21:05

## 2022-07-28 RX ADMIN — CASTOR OIL AND BALSAM, PERU 1 APPLICATION: 788; 87 OINTMENT TOPICAL at 09:08

## 2022-07-28 RX ADMIN — Medication 10 ML: at 09:09

## 2022-07-28 RX ADMIN — ONDANSETRON 4 MG: 2 INJECTION INTRAMUSCULAR; INTRAVENOUS at 14:21

## 2022-07-28 RX ADMIN — BUMETANIDE 1 MG: 1 TABLET ORAL at 11:52

## 2022-07-28 RX ADMIN — Medication 10 ML: at 21:07

## 2022-07-28 RX ADMIN — VITAM B12 100 MCG: 100 TAB at 09:08

## 2022-07-28 RX ADMIN — ACETAMINOPHEN 1000 MG: 500 TABLET, FILM COATED ORAL at 09:08

## 2022-07-28 RX ADMIN — ACETAMINOPHEN 1000 MG: 500 TABLET, FILM COATED ORAL at 21:05

## 2022-07-28 RX ADMIN — INSULIN DETEMIR 25 UNITS: 100 INJECTION, SOLUTION SUBCUTANEOUS at 09:33

## 2022-07-28 RX ADMIN — VANCOMYCIN HYDROCHLORIDE 1000 MG: 1 INJECTION, SOLUTION INTRAVENOUS at 09:28

## 2022-07-28 RX ADMIN — GUAIFENESIN AND DEXTROMETHORPHAN HYDROBROMIDE 1 TABLET: 600; 30 TABLET, EXTENDED RELEASE ORAL at 11:08

## 2022-07-28 RX ADMIN — ALLOPURINOL 300 MG: 300 TABLET ORAL at 09:08

## 2022-07-28 RX ADMIN — ACETAMINOPHEN 1000 MG: 500 TABLET, FILM COATED ORAL at 17:07

## 2022-07-28 RX ADMIN — AMLODIPINE BESYLATE 5 MG: 5 TABLET ORAL at 09:08

## 2022-07-28 RX ADMIN — ATORVASTATIN CALCIUM 80 MG: 40 TABLET, FILM COATED ORAL at 21:05

## 2022-07-29 ENCOUNTER — APPOINTMENT (OUTPATIENT)
Dept: CARDIOLOGY | Facility: HOSPITAL | Age: 82
End: 2022-07-29

## 2022-07-29 LAB
ANION GAP SERPL CALCULATED.3IONS-SCNC: 7 MMOL/L (ref 5–15)
BASOPHILS # BLD AUTO: 0.02 10*3/MM3 (ref 0–0.2)
BASOPHILS NFR BLD AUTO: 0.3 % (ref 0–1.5)
BH CV ECHO MEAS - AO MAX PG: 6.9 MMHG
BH CV ECHO MEAS - AO MEAN PG: 4 MMHG
BH CV ECHO MEAS - AO ROOT DIAM: 2.7 CM
BH CV ECHO MEAS - AO V2 MAX: 131 CM/SEC
BH CV ECHO MEAS - AO V2 VTI: 32.2 CM
BH CV ECHO MEAS - AVA(I,D): 2.17 CM2
BH CV ECHO MEAS - EDV(CUBED): 140.6 ML
BH CV ECHO MEAS - EDV(MOD-SP2): 80.9 ML
BH CV ECHO MEAS - EDV(MOD-SP4): 83.8 ML
BH CV ECHO MEAS - EF(MOD-BP): 52.8 %
BH CV ECHO MEAS - EF(MOD-SP2): 50.6 %
BH CV ECHO MEAS - EF(MOD-SP4): 58.6 %
BH CV ECHO MEAS - ESV(CUBED): 22 ML
BH CV ECHO MEAS - ESV(MOD-SP2): 40 ML
BH CV ECHO MEAS - ESV(MOD-SP4): 34.7 ML
BH CV ECHO MEAS - FS: 46.2 %
BH CV ECHO MEAS - IVS/LVPW: 1 CM
BH CV ECHO MEAS - IVSD: 0.9 CM
BH CV ECHO MEAS - LA DIMENSION: 3.8 CM
BH CV ECHO MEAS - LAT PEAK E' VEL: 9.9 CM/SEC
BH CV ECHO MEAS - LV MASS(C)D: 169 GRAMS
BH CV ECHO MEAS - LV MAX PG: 3.1 MMHG
BH CV ECHO MEAS - LV MEAN PG: 2 MMHG
BH CV ECHO MEAS - LV V1 MAX: 88 CM/SEC
BH CV ECHO MEAS - LV V1 VTI: 22.2 CM
BH CV ECHO MEAS - LVIDD: 5.2 CM
BH CV ECHO MEAS - LVIDS: 2.8 CM
BH CV ECHO MEAS - LVOT AREA: 3.1 CM2
BH CV ECHO MEAS - LVOT DIAM: 2 CM
BH CV ECHO MEAS - LVPWD: 0.9 CM
BH CV ECHO MEAS - MED PEAK E' VEL: 10.6 CM/SEC
BH CV ECHO MEAS - MV A MAX VEL: 112 CM/SEC
BH CV ECHO MEAS - MV DEC SLOPE: 629 CM/SEC2
BH CV ECHO MEAS - MV DEC TIME: 0.17 MSEC
BH CV ECHO MEAS - MV E MAX VEL: 129 CM/SEC
BH CV ECHO MEAS - MV E/A: 1.15
BH CV ECHO MEAS - MV MAX PG: 7.4 MMHG
BH CV ECHO MEAS - MV MEAN PG: 3 MMHG
BH CV ECHO MEAS - MV P1/2T: 65.2 MSEC
BH CV ECHO MEAS - MV V2 VTI: 44.6 CM
BH CV ECHO MEAS - MVA(P1/2T): 3.4 CM2
BH CV ECHO MEAS - MVA(VTI): 1.56 CM2
BH CV ECHO MEAS - PA ACC TIME: 0.08 SEC
BH CV ECHO MEAS - PA PR(ACCEL): 44.4 MMHG
BH CV ECHO MEAS - SV(LVOT): 69.7 ML
BH CV ECHO MEAS - SV(MOD-SP2): 40.9 ML
BH CV ECHO MEAS - SV(MOD-SP4): 49.1 ML
BH CV ECHO MEAS - TAPSE (>1.6): 2.9 CM
BH CV ECHO MEASUREMENTS AVERAGE E/E' RATIO: 12.59
BH CV VAS BP RIGHT ARM: NORMAL MMHG
BH CV XLRA - RV BASE: 3.3 CM
BH CV XLRA - RV LENGTH: 6.8 CM
BH CV XLRA - RV MID: 2.3 CM
BH CV XLRA - TDI S': 16 CM/SEC
BUN SERPL-MCNC: 91 MG/DL (ref 8–23)
BUN/CREAT SERPL: 52.6 (ref 7–25)
CALCIUM SPEC-SCNC: 8.5 MG/DL (ref 8.6–10.5)
CHLORIDE SERPL-SCNC: 96 MMOL/L (ref 98–107)
CK SERPL-CCNC: 334 U/L (ref 20–180)
CO2 SERPL-SCNC: 30 MMOL/L (ref 22–29)
CREAT SERPL-MCNC: 1.73 MG/DL (ref 0.57–1)
DEPRECATED RDW RBC AUTO: 53.4 FL (ref 37–54)
EGFRCR SERPLBLD CKD-EPI 2021: 29 ML/MIN/1.73
EOSINOPHIL # BLD AUTO: 0.08 10*3/MM3 (ref 0–0.4)
EOSINOPHIL NFR BLD AUTO: 1.4 % (ref 0.3–6.2)
ERYTHROCYTE [DISTWIDTH] IN BLOOD BY AUTOMATED COUNT: 16.3 % (ref 12.3–15.4)
GLUCOSE BLDC GLUCOMTR-MCNC: 113 MG/DL (ref 70–130)
GLUCOSE BLDC GLUCOMTR-MCNC: 205 MG/DL (ref 70–130)
GLUCOSE BLDC GLUCOMTR-MCNC: 264 MG/DL (ref 70–130)
GLUCOSE BLDC GLUCOMTR-MCNC: 270 MG/DL (ref 70–130)
GLUCOSE SERPL-MCNC: 213 MG/DL (ref 65–99)
HCT VFR BLD AUTO: 29.2 % (ref 34–46.6)
HGB BLD-MCNC: 9.6 G/DL (ref 12–15.9)
IMM GRANULOCYTES # BLD AUTO: 0.19 10*3/MM3 (ref 0–0.05)
IMM GRANULOCYTES NFR BLD AUTO: 3.3 % (ref 0–0.5)
LEFT ATRIUM VOLUME INDEX: 26.8 ML/M2
LV EF 2D ECHO EST: 55 %
LYMPHOCYTES # BLD AUTO: 0.86 10*3/MM3 (ref 0.7–3.1)
LYMPHOCYTES NFR BLD AUTO: 14.8 % (ref 19.6–45.3)
MAXIMAL PREDICTED HEART RATE: 137 BPM
MCH RBC QN AUTO: 29.7 PG (ref 26.6–33)
MCHC RBC AUTO-ENTMCNC: 32.9 G/DL (ref 31.5–35.7)
MCV RBC AUTO: 90.4 FL (ref 79–97)
MONOCYTES # BLD AUTO: 0.61 10*3/MM3 (ref 0.1–0.9)
MONOCYTES NFR BLD AUTO: 10.5 % (ref 5–12)
NEUTROPHILS NFR BLD AUTO: 4.06 10*3/MM3 (ref 1.7–7)
NEUTROPHILS NFR BLD AUTO: 69.7 % (ref 42.7–76)
NRBC BLD AUTO-RTO: 0.3 /100 WBC (ref 0–0.2)
PLATELET # BLD AUTO: 186 10*3/MM3 (ref 140–450)
PMV BLD AUTO: 13.1 FL (ref 6–12)
POTASSIUM SERPL-SCNC: 4.7 MMOL/L (ref 3.5–5.2)
QT INTERVAL: 446 MS
QTC INTERVAL: 446 MS
RBC # BLD AUTO: 3.23 10*6/MM3 (ref 3.77–5.28)
SODIUM SERPL-SCNC: 133 MMOL/L (ref 136–145)
STRESS TARGET HR: 116 BPM
WBC NRBC COR # BLD: 5.82 10*3/MM3 (ref 3.4–10.8)

## 2022-07-29 PROCEDURE — 80048 BASIC METABOLIC PNL TOTAL CA: CPT

## 2022-07-29 PROCEDURE — 93306 TTE W/DOPPLER COMPLETE: CPT

## 2022-07-29 PROCEDURE — 63710000001 INSULIN LISPRO (HUMAN) PER 5 UNITS: Performed by: INTERNAL MEDICINE

## 2022-07-29 PROCEDURE — 87205 SMEAR GRAM STAIN: CPT | Performed by: INTERNAL MEDICINE

## 2022-07-29 PROCEDURE — 99232 SBSQ HOSP IP/OBS MODERATE 35: CPT | Performed by: INTERNAL MEDICINE

## 2022-07-29 PROCEDURE — 97530 THERAPEUTIC ACTIVITIES: CPT

## 2022-07-29 PROCEDURE — 97110 THERAPEUTIC EXERCISES: CPT

## 2022-07-29 PROCEDURE — 82962 GLUCOSE BLOOD TEST: CPT

## 2022-07-29 PROCEDURE — 87040 BLOOD CULTURE FOR BACTERIA: CPT | Performed by: PHYSICIAN ASSISTANT

## 2022-07-29 PROCEDURE — 25010000002 HEPARIN (PORCINE) PER 1000 UNITS: Performed by: INTERNAL MEDICINE

## 2022-07-29 PROCEDURE — 25010000002 DAPTOMYCIN PER 1 MG: Performed by: PHYSICIAN ASSISTANT

## 2022-07-29 PROCEDURE — 82550 ASSAY OF CK (CPK): CPT | Performed by: PHYSICIAN ASSISTANT

## 2022-07-29 PROCEDURE — 63710000001 INSULIN DETEMIR PER 5 UNITS: Performed by: STUDENT IN AN ORGANIZED HEALTH CARE EDUCATION/TRAINING PROGRAM

## 2022-07-29 PROCEDURE — 85025 COMPLETE CBC W/AUTO DIFF WBC: CPT | Performed by: STUDENT IN AN ORGANIZED HEALTH CARE EDUCATION/TRAINING PROGRAM

## 2022-07-29 PROCEDURE — 93306 TTE W/DOPPLER COMPLETE: CPT | Performed by: INTERNAL MEDICINE

## 2022-07-29 PROCEDURE — 87070 CULTURE OTHR SPECIMN AEROBIC: CPT | Performed by: INTERNAL MEDICINE

## 2022-07-29 RX ORDER — OXYCODONE HYDROCHLORIDE 5 MG/1
2.5 TABLET ORAL EVERY 4 HOURS PRN
Status: ACTIVE | OUTPATIENT
Start: 2022-07-29 | End: 2022-08-03

## 2022-07-29 RX ORDER — LIDOCAINE HYDROCHLORIDE 10 MG/ML
INJECTION, SOLUTION EPIDURAL; INFILTRATION; INTRACAUDAL; PERINEURAL
Status: COMPLETED
Start: 2022-07-29 | End: 2022-07-29

## 2022-07-29 RX ADMIN — Medication 10 ML: at 20:55

## 2022-07-29 RX ADMIN — HEPARIN SODIUM 5000 UNITS: 5000 INJECTION INTRAVENOUS; SUBCUTANEOUS at 08:36

## 2022-07-29 RX ADMIN — DICLOFENAC 2 G: 10 GEL TOPICAL at 20:54

## 2022-07-29 RX ADMIN — ASPIRIN 81 MG: 81 TABLET, COATED ORAL at 08:36

## 2022-07-29 RX ADMIN — CASTOR OIL AND BALSAM, PERU 1 APPLICATION: 788; 87 OINTMENT TOPICAL at 08:37

## 2022-07-29 RX ADMIN — PREGABALIN 50 MG: 50 CAPSULE ORAL at 08:36

## 2022-07-29 RX ADMIN — VITAM B12 100 MCG: 100 TAB at 08:36

## 2022-07-29 RX ADMIN — BUMETANIDE 1 MG: 1 TABLET ORAL at 08:36

## 2022-07-29 RX ADMIN — CASTOR OIL AND BALSAM, PERU 1 APPLICATION: 788; 87 OINTMENT TOPICAL at 20:55

## 2022-07-29 RX ADMIN — INSULIN LISPRO 3 UNITS: 100 INJECTION, SOLUTION INTRAVENOUS; SUBCUTANEOUS at 12:21

## 2022-07-29 RX ADMIN — LIDOCAINE HYDROCHLORIDE: 10 INJECTION, SOLUTION EPIDURAL; INFILTRATION; INTRACAUDAL; PERINEURAL at 13:44

## 2022-07-29 RX ADMIN — INSULIN LISPRO 4 UNITS: 100 INJECTION, SOLUTION INTRAVENOUS; SUBCUTANEOUS at 20:53

## 2022-07-29 RX ADMIN — Medication 10 ML: at 08:36

## 2022-07-29 RX ADMIN — PREGABALIN 50 MG: 50 CAPSULE ORAL at 20:53

## 2022-07-29 RX ADMIN — INSULIN DETEMIR 25 UNITS: 100 INJECTION, SOLUTION SUBCUTANEOUS at 08:38

## 2022-07-29 RX ADMIN — HEPARIN SODIUM 5000 UNITS: 5000 INJECTION INTRAVENOUS; SUBCUTANEOUS at 20:53

## 2022-07-29 RX ADMIN — AMLODIPINE BESYLATE 5 MG: 5 TABLET ORAL at 08:35

## 2022-07-29 RX ADMIN — ISOSORBIDE DINITRATE 20 MG: 20 TABLET ORAL at 20:53

## 2022-07-29 RX ADMIN — OXYCODONE 5 MG: 5 TABLET ORAL at 12:59

## 2022-07-29 RX ADMIN — INSULIN LISPRO 4 UNITS: 100 INJECTION, SOLUTION INTRAVENOUS; SUBCUTANEOUS at 18:19

## 2022-07-29 RX ADMIN — LIDOCAINE 1 PATCH: 50 PATCH CUTANEOUS at 08:37

## 2022-07-29 RX ADMIN — DAPTOMYCIN 600 MG: 500 INJECTION, POWDER, LYOPHILIZED, FOR SOLUTION INTRAVENOUS at 14:28

## 2022-07-29 RX ADMIN — DICLOFENAC 2 G: 10 GEL TOPICAL at 18:21

## 2022-07-29 RX ADMIN — ALLOPURINOL 300 MG: 300 TABLET ORAL at 08:36

## 2022-07-29 RX ADMIN — DICLOFENAC 2 G: 10 GEL TOPICAL at 12:22

## 2022-07-29 RX ADMIN — DICLOFENAC 2 G: 10 GEL TOPICAL at 08:37

## 2022-07-30 LAB
ANION GAP SERPL CALCULATED.3IONS-SCNC: 12 MMOL/L (ref 5–15)
BACTERIA SPEC AEROBE CULT: ABNORMAL
BUN SERPL-MCNC: 90 MG/DL (ref 8–23)
BUN/CREAT SERPL: 46.6 (ref 7–25)
CALCIUM SPEC-SCNC: 8.1 MG/DL (ref 8.6–10.5)
CHLORIDE SERPL-SCNC: 97 MMOL/L (ref 98–107)
CO2 SERPL-SCNC: 25 MMOL/L (ref 22–29)
CREAT SERPL-MCNC: 1.93 MG/DL (ref 0.57–1)
DEPRECATED RDW RBC AUTO: 53.9 FL (ref 37–54)
EGFRCR SERPLBLD CKD-EPI 2021: 25.4 ML/MIN/1.73
ERYTHROCYTE [DISTWIDTH] IN BLOOD BY AUTOMATED COUNT: 16.4 % (ref 12.3–15.4)
GLUCOSE BLDC GLUCOMTR-MCNC: 209 MG/DL (ref 70–130)
GLUCOSE BLDC GLUCOMTR-MCNC: 225 MG/DL (ref 70–130)
GLUCOSE BLDC GLUCOMTR-MCNC: 226 MG/DL (ref 70–130)
GLUCOSE BLDC GLUCOMTR-MCNC: 280 MG/DL (ref 70–130)
GLUCOSE BLDC GLUCOMTR-MCNC: 371 MG/DL (ref 70–130)
GLUCOSE SERPL-MCNC: 249 MG/DL (ref 65–99)
GRAM STN SPEC: ABNORMAL
HCT VFR BLD AUTO: 27.5 % (ref 34–46.6)
HGB BLD-MCNC: 8.8 G/DL (ref 12–15.9)
ISOLATED FROM: ABNORMAL
MCH RBC QN AUTO: 28.9 PG (ref 26.6–33)
MCHC RBC AUTO-ENTMCNC: 32 G/DL (ref 31.5–35.7)
MCV RBC AUTO: 90.5 FL (ref 79–97)
PLATELET # BLD AUTO: 160 10*3/MM3 (ref 140–450)
PMV BLD AUTO: 13.2 FL (ref 6–12)
POTASSIUM SERPL-SCNC: 5 MMOL/L (ref 3.5–5.2)
RBC # BLD AUTO: 3.04 10*6/MM3 (ref 3.77–5.28)
SODIUM SERPL-SCNC: 134 MMOL/L (ref 136–145)
WBC NRBC COR # BLD: 5.34 10*3/MM3 (ref 3.4–10.8)

## 2022-07-30 PROCEDURE — 82962 GLUCOSE BLOOD TEST: CPT

## 2022-07-30 PROCEDURE — 63710000001 INSULIN LISPRO (HUMAN) PER 5 UNITS: Performed by: INTERNAL MEDICINE

## 2022-07-30 PROCEDURE — 80048 BASIC METABOLIC PNL TOTAL CA: CPT | Performed by: INTERNAL MEDICINE

## 2022-07-30 PROCEDURE — 63710000001 INSULIN DETEMIR PER 5 UNITS: Performed by: STUDENT IN AN ORGANIZED HEALTH CARE EDUCATION/TRAINING PROGRAM

## 2022-07-30 PROCEDURE — 99232 SBSQ HOSP IP/OBS MODERATE 35: CPT | Performed by: INTERNAL MEDICINE

## 2022-07-30 PROCEDURE — 85027 COMPLETE CBC AUTOMATED: CPT | Performed by: INTERNAL MEDICINE

## 2022-07-30 PROCEDURE — 29581 APPL MULTLAYER CMPRN SYS LEG: CPT

## 2022-07-30 PROCEDURE — 25010000002 HEPARIN (PORCINE) PER 1000 UNITS: Performed by: INTERNAL MEDICINE

## 2022-07-30 RX ORDER — HYDRALAZINE HYDROCHLORIDE 25 MG/1
25 TABLET, FILM COATED ORAL EVERY 8 HOURS SCHEDULED
Status: DISCONTINUED | OUTPATIENT
Start: 2022-07-30 | End: 2022-08-12 | Stop reason: HOSPADM

## 2022-07-30 RX ORDER — INSULIN LISPRO 100 [IU]/ML
3 INJECTION, SOLUTION INTRAVENOUS; SUBCUTANEOUS
Status: DISCONTINUED | OUTPATIENT
Start: 2022-07-30 | End: 2022-08-01

## 2022-07-30 RX ADMIN — ACETAMINOPHEN 650 MG: 325 TABLET, FILM COATED ORAL at 20:23

## 2022-07-30 RX ADMIN — INSULIN LISPRO 6 UNITS: 100 INJECTION, SOLUTION INTRAVENOUS; SUBCUTANEOUS at 08:07

## 2022-07-30 RX ADMIN — ISOSORBIDE DINITRATE 20 MG: 20 TABLET ORAL at 08:08

## 2022-07-30 RX ADMIN — DICLOFENAC 2 G: 10 GEL TOPICAL at 16:38

## 2022-07-30 RX ADMIN — HYDRALAZINE HYDROCHLORIDE 25 MG: 25 TABLET, FILM COATED ORAL at 20:23

## 2022-07-30 RX ADMIN — INSULIN LISPRO 3 UNITS: 100 INJECTION, SOLUTION INTRAVENOUS; SUBCUTANEOUS at 16:39

## 2022-07-30 RX ADMIN — INSULIN DETEMIR 25 UNITS: 100 INJECTION, SOLUTION SUBCUTANEOUS at 08:07

## 2022-07-30 RX ADMIN — HEPARIN SODIUM 5000 UNITS: 5000 INJECTION INTRAVENOUS; SUBCUTANEOUS at 20:20

## 2022-07-30 RX ADMIN — INSULIN LISPRO 4 UNITS: 100 INJECTION, SOLUTION INTRAVENOUS; SUBCUTANEOUS at 20:20

## 2022-07-30 RX ADMIN — PREGABALIN 50 MG: 50 CAPSULE ORAL at 08:08

## 2022-07-30 RX ADMIN — ISOSORBIDE DINITRATE 20 MG: 20 TABLET ORAL at 20:20

## 2022-07-30 RX ADMIN — ASPIRIN 81 MG: 81 TABLET, COATED ORAL at 08:07

## 2022-07-30 RX ADMIN — CASTOR OIL AND BALSAM, PERU 1 APPLICATION: 788; 87 OINTMENT TOPICAL at 20:19

## 2022-07-30 RX ADMIN — AMLODIPINE BESYLATE 5 MG: 5 TABLET ORAL at 08:08

## 2022-07-30 RX ADMIN — VITAM B12 100 MCG: 100 TAB at 08:07

## 2022-07-30 RX ADMIN — DICLOFENAC 2 G: 10 GEL TOPICAL at 11:41

## 2022-07-30 RX ADMIN — HYDRALAZINE HYDROCHLORIDE 25 MG: 25 TABLET, FILM COATED ORAL at 16:39

## 2022-07-30 RX ADMIN — HEPARIN SODIUM 5000 UNITS: 5000 INJECTION INTRAVENOUS; SUBCUTANEOUS at 08:08

## 2022-07-30 RX ADMIN — INSULIN LISPRO 8 UNITS: 100 INJECTION, SOLUTION INTRAVENOUS; SUBCUTANEOUS at 11:41

## 2022-07-30 RX ADMIN — INSULIN LISPRO 6 UNITS: 100 INJECTION, SOLUTION INTRAVENOUS; SUBCUTANEOUS at 16:38

## 2022-07-30 RX ADMIN — CASTOR OIL AND BALSAM, PERU 1 APPLICATION: 788; 87 OINTMENT TOPICAL at 08:06

## 2022-07-30 RX ADMIN — DICLOFENAC 2 G: 10 GEL TOPICAL at 08:07

## 2022-07-30 RX ADMIN — LIDOCAINE 1 PATCH: 50 PATCH CUTANEOUS at 08:07

## 2022-07-30 RX ADMIN — DICLOFENAC 2 G: 10 GEL TOPICAL at 20:19

## 2022-07-30 RX ADMIN — ALLOPURINOL 300 MG: 300 TABLET ORAL at 08:07

## 2022-07-30 RX ADMIN — BUMETANIDE 1 MG: 1 TABLET ORAL at 08:08

## 2022-07-30 RX ADMIN — PREGABALIN 50 MG: 50 CAPSULE ORAL at 20:20

## 2022-07-31 ENCOUNTER — PATIENT MESSAGE (OUTPATIENT)
Dept: INTERNAL MEDICINE | Facility: CLINIC | Age: 82
End: 2022-07-31

## 2022-07-31 ENCOUNTER — APPOINTMENT (OUTPATIENT)
Dept: MRI IMAGING | Facility: HOSPITAL | Age: 82
End: 2022-07-31

## 2022-07-31 LAB
ALBUMIN SERPL-MCNC: 2.8 G/DL (ref 3.5–5.2)
ALBUMIN/GLOB SERPL: 1.2 G/DL
ALP SERPL-CCNC: 197 U/L (ref 39–117)
ALT SERPL W P-5'-P-CCNC: 24 U/L (ref 1–33)
ANION GAP SERPL CALCULATED.3IONS-SCNC: 5 MMOL/L (ref 5–15)
AST SERPL-CCNC: 29 U/L (ref 1–32)
BILIRUB SERPL-MCNC: 0.7 MG/DL (ref 0–1.2)
BUN SERPL-MCNC: 79 MG/DL (ref 8–23)
BUN/CREAT SERPL: 48.8 (ref 7–25)
CALCIUM SPEC-SCNC: 8.3 MG/DL (ref 8.6–10.5)
CHLORIDE SERPL-SCNC: 98 MMOL/L (ref 98–107)
CK SERPL-CCNC: 344 U/L (ref 20–180)
CO2 SERPL-SCNC: 31 MMOL/L (ref 22–29)
CREAT SERPL-MCNC: 1.62 MG/DL (ref 0.57–1)
CREAT UR-MCNC: 27 MG/DL
DEPRECATED RDW RBC AUTO: 54.7 FL (ref 37–54)
EGFRCR SERPLBLD CKD-EPI 2021: 31.4 ML/MIN/1.73
EOSINOPHIL SPEC QL MICRO: 0 % EOS/100 CELLS (ref 0–0)
ERYTHROCYTE [DISTWIDTH] IN BLOOD BY AUTOMATED COUNT: 16.6 % (ref 12.3–15.4)
GLOBULIN UR ELPH-MCNC: 2.3 GM/DL
GLUCOSE BLDC GLUCOMTR-MCNC: 207 MG/DL (ref 70–130)
GLUCOSE BLDC GLUCOMTR-MCNC: 213 MG/DL (ref 70–130)
GLUCOSE BLDC GLUCOMTR-MCNC: 249 MG/DL (ref 70–130)
GLUCOSE BLDC GLUCOMTR-MCNC: 259 MG/DL (ref 70–130)
GLUCOSE SERPL-MCNC: 87 MG/DL (ref 65–99)
HCT VFR BLD AUTO: 27.1 % (ref 34–46.6)
HGB BLD-MCNC: 9.1 G/DL (ref 12–15.9)
MCH RBC QN AUTO: 30.3 PG (ref 26.6–33)
MCHC RBC AUTO-ENTMCNC: 33.6 G/DL (ref 31.5–35.7)
MCV RBC AUTO: 90.3 FL (ref 79–97)
PHOSPHATE SERPL-MCNC: 4.2 MG/DL (ref 2.5–4.5)
PLATELET # BLD AUTO: 184 10*3/MM3 (ref 140–450)
PMV BLD AUTO: 12.7 FL (ref 6–12)
POTASSIUM SERPL-SCNC: 4.2 MMOL/L (ref 3.5–5.2)
PROT ?TM UR-MCNC: 64.4 MG/DL
PROT SERPL-MCNC: 5.1 G/DL (ref 6–8.5)
RBC # BLD AUTO: 3 10*6/MM3 (ref 3.77–5.28)
SODIUM SERPL-SCNC: 134 MMOL/L (ref 136–145)
SODIUM UR-SCNC: 38 MMOL/L
URATE SERPL-MCNC: 4.2 MG/DL (ref 2.4–5.7)
UUN 24H UR-MCNC: 391 MG/DL
WBC NRBC COR # BLD: 5.32 10*3/MM3 (ref 3.4–10.8)

## 2022-07-31 PROCEDURE — 84550 ASSAY OF BLOOD/URIC ACID: CPT | Performed by: INTERNAL MEDICINE

## 2022-07-31 PROCEDURE — 63710000001 INSULIN LISPRO (HUMAN) PER 5 UNITS: Performed by: INTERNAL MEDICINE

## 2022-07-31 PROCEDURE — 99232 SBSQ HOSP IP/OBS MODERATE 35: CPT | Performed by: INTERNAL MEDICINE

## 2022-07-31 PROCEDURE — 63710000001 INSULIN DETEMIR PER 5 UNITS: Performed by: STUDENT IN AN ORGANIZED HEALTH CARE EDUCATION/TRAINING PROGRAM

## 2022-07-31 PROCEDURE — 84540 ASSAY OF URINE/UREA-N: CPT | Performed by: INTERNAL MEDICINE

## 2022-07-31 PROCEDURE — 25010000002 DAPTOMYCIN PER 1 MG: Performed by: PHYSICIAN ASSISTANT

## 2022-07-31 PROCEDURE — P9047 ALBUMIN (HUMAN), 25%, 50ML: HCPCS | Performed by: INTERNAL MEDICINE

## 2022-07-31 PROCEDURE — 85027 COMPLETE CBC AUTOMATED: CPT | Performed by: INTERNAL MEDICINE

## 2022-07-31 PROCEDURE — 87205 SMEAR GRAM STAIN: CPT | Performed by: INTERNAL MEDICINE

## 2022-07-31 PROCEDURE — 25010000002 HEPARIN (PORCINE) PER 1000 UNITS: Performed by: INTERNAL MEDICINE

## 2022-07-31 PROCEDURE — 82570 ASSAY OF URINE CREATININE: CPT | Performed by: INTERNAL MEDICINE

## 2022-07-31 PROCEDURE — 84300 ASSAY OF URINE SODIUM: CPT | Performed by: INTERNAL MEDICINE

## 2022-07-31 PROCEDURE — 84100 ASSAY OF PHOSPHORUS: CPT | Performed by: INTERNAL MEDICINE

## 2022-07-31 PROCEDURE — 80053 COMPREHEN METABOLIC PANEL: CPT | Performed by: INTERNAL MEDICINE

## 2022-07-31 PROCEDURE — 73721 MRI JNT OF LWR EXTRE W/O DYE: CPT

## 2022-07-31 PROCEDURE — 82962 GLUCOSE BLOOD TEST: CPT

## 2022-07-31 PROCEDURE — 25010000002 ALBUMIN HUMAN 25% PER 50 ML: Performed by: INTERNAL MEDICINE

## 2022-07-31 PROCEDURE — 82550 ASSAY OF CK (CPK): CPT | Performed by: INTERNAL MEDICINE

## 2022-07-31 PROCEDURE — 84156 ASSAY OF PROTEIN URINE: CPT | Performed by: INTERNAL MEDICINE

## 2022-07-31 RX ORDER — POLYETHYLENE GLYCOL 3350 17 G/17G
17 POWDER, FOR SOLUTION ORAL DAILY
Status: DISCONTINUED | OUTPATIENT
Start: 2022-07-31 | End: 2022-08-04

## 2022-07-31 RX ORDER — AMOXICILLIN 250 MG
1 CAPSULE ORAL 2 TIMES DAILY
Status: DISCONTINUED | OUTPATIENT
Start: 2022-07-31 | End: 2022-08-12

## 2022-07-31 RX ORDER — METHOCARBAMOL 750 MG/1
750 TABLET, FILM COATED ORAL EVERY 6 HOURS PRN
Status: DISCONTINUED | OUTPATIENT
Start: 2022-07-31 | End: 2022-08-03

## 2022-07-31 RX ORDER — ALBUMIN (HUMAN) 12.5 G/50ML
12.5 SOLUTION INTRAVENOUS 2 TIMES DAILY
Status: COMPLETED | OUTPATIENT
Start: 2022-07-31 | End: 2022-08-01

## 2022-07-31 RX ADMIN — DICLOFENAC 2 G: 10 GEL TOPICAL at 12:42

## 2022-07-31 RX ADMIN — INSULIN LISPRO 4 UNITS: 100 INJECTION, SOLUTION INTRAVENOUS; SUBCUTANEOUS at 12:43

## 2022-07-31 RX ADMIN — SENNOSIDES AND DOCUSATE SODIUM 1 TABLET: 50; 8.6 TABLET ORAL at 22:02

## 2022-07-31 RX ADMIN — INSULIN LISPRO 3 UNITS: 100 INJECTION, SOLUTION INTRAVENOUS; SUBCUTANEOUS at 18:53

## 2022-07-31 RX ADMIN — ACETAMINOPHEN 650 MG: 325 TABLET, FILM COATED ORAL at 09:12

## 2022-07-31 RX ADMIN — INSULIN LISPRO 4 UNITS: 100 INJECTION, SOLUTION INTRAVENOUS; SUBCUTANEOUS at 09:11

## 2022-07-31 RX ADMIN — POLYETHYLENE GLYCOL 3350 17 G: 17 POWDER, FOR SOLUTION ORAL at 12:42

## 2022-07-31 RX ADMIN — HYDRALAZINE HYDROCHLORIDE 25 MG: 25 TABLET, FILM COATED ORAL at 15:08

## 2022-07-31 RX ADMIN — ASPIRIN 81 MG: 81 TABLET, COATED ORAL at 09:12

## 2022-07-31 RX ADMIN — METHOCARBAMOL 750 MG: 750 TABLET ORAL at 12:43

## 2022-07-31 RX ADMIN — INSULIN LISPRO 3 UNITS: 100 INJECTION, SOLUTION INTRAVENOUS; SUBCUTANEOUS at 12:44

## 2022-07-31 RX ADMIN — ALBUMIN HUMAN 12.5 G: 0.25 SOLUTION INTRAVENOUS at 22:02

## 2022-07-31 RX ADMIN — INSULIN LISPRO 4 UNITS: 100 INJECTION, SOLUTION INTRAVENOUS; SUBCUTANEOUS at 22:02

## 2022-07-31 RX ADMIN — ALLOPURINOL 300 MG: 300 TABLET ORAL at 09:12

## 2022-07-31 RX ADMIN — ISOSORBIDE DINITRATE 20 MG: 20 TABLET ORAL at 09:12

## 2022-07-31 RX ADMIN — PREGABALIN 50 MG: 50 CAPSULE ORAL at 09:12

## 2022-07-31 RX ADMIN — HEPARIN SODIUM 5000 UNITS: 5000 INJECTION INTRAVENOUS; SUBCUTANEOUS at 09:12

## 2022-07-31 RX ADMIN — HYDRALAZINE HYDROCHLORIDE 25 MG: 25 TABLET, FILM COATED ORAL at 05:52

## 2022-07-31 RX ADMIN — DICLOFENAC 2 G: 10 GEL TOPICAL at 22:15

## 2022-07-31 RX ADMIN — HYDRALAZINE HYDROCHLORIDE 25 MG: 25 TABLET, FILM COATED ORAL at 22:03

## 2022-07-31 RX ADMIN — CASTOR OIL AND BALSAM, PERU 1 APPLICATION: 788; 87 OINTMENT TOPICAL at 22:15

## 2022-07-31 RX ADMIN — DICLOFENAC 2 G: 10 GEL TOPICAL at 09:10

## 2022-07-31 RX ADMIN — VITAM B12 100 MCG: 100 TAB at 09:12

## 2022-07-31 RX ADMIN — ISOSORBIDE DINITRATE 20 MG: 20 TABLET ORAL at 22:02

## 2022-07-31 RX ADMIN — CASTOR OIL AND BALSAM, PERU 1 APPLICATION: 788; 87 OINTMENT TOPICAL at 09:12

## 2022-07-31 RX ADMIN — BUMETANIDE 1 MG: 1 TABLET ORAL at 09:11

## 2022-07-31 RX ADMIN — ACETAMINOPHEN 650 MG: 325 TABLET, FILM COATED ORAL at 12:43

## 2022-07-31 RX ADMIN — SENNOSIDES AND DOCUSATE SODIUM 1 TABLET: 50; 8.6 TABLET ORAL at 12:43

## 2022-07-31 RX ADMIN — INSULIN DETEMIR 25 UNITS: 100 INJECTION, SOLUTION SUBCUTANEOUS at 09:11

## 2022-07-31 RX ADMIN — INSULIN LISPRO 3 UNITS: 100 INJECTION, SOLUTION INTRAVENOUS; SUBCUTANEOUS at 09:13

## 2022-07-31 RX ADMIN — HEPARIN SODIUM 5000 UNITS: 5000 INJECTION INTRAVENOUS; SUBCUTANEOUS at 22:02

## 2022-07-31 RX ADMIN — AMLODIPINE BESYLATE 5 MG: 5 TABLET ORAL at 09:12

## 2022-07-31 RX ADMIN — LIDOCAINE 1 PATCH: 50 PATCH CUTANEOUS at 09:13

## 2022-07-31 RX ADMIN — INSULIN LISPRO 6 UNITS: 100 INJECTION, SOLUTION INTRAVENOUS; SUBCUTANEOUS at 18:41

## 2022-07-31 RX ADMIN — PREGABALIN 50 MG: 50 CAPSULE ORAL at 22:02

## 2022-07-31 RX ADMIN — DAPTOMYCIN 600 MG: 500 INJECTION, POWDER, LYOPHILIZED, FOR SOLUTION INTRAVENOUS at 15:08

## 2022-08-01 ENCOUNTER — APPOINTMENT (OUTPATIENT)
Dept: CARDIOLOGY | Facility: HOSPITAL | Age: 82
End: 2022-08-01

## 2022-08-01 LAB
ANION GAP SERPL CALCULATED.3IONS-SCNC: 7 MMOL/L (ref 5–15)
BH CV ECHO MEAS - DIST REN A EDV LEFT: 12 CM/S
BH CV ECHO MEAS - DIST REN A PSV LEFT: 47.5 CM/S
BH CV ECHO MEAS - MID REN A EDV LEFT: 18 CM/S
BH CV ECHO MEAS - MID REN A PSV LEFT: 65.5 CM/S
BH CV VAS KIDNEY HEIGHT LEFT: 5.6 CM
BH CV VAS RENAL AORTIC MID PSV: 107 CM/S
BH CV VAS RENAL HILUM LEFT EDV: 7.9 CM/S
BH CV VAS RENAL HILUM LEFT PSV: 38.2 CM/S
BH CV VAS RENAL HILUM RIGHT EDV: 14.9 CM/S
BH CV VAS RENAL HILUM RIGHT PSV: 39.4 CM/S
BH CV XLRA MEAS - KID L LEFT: 12.2 CM
BH CV XLRA MEAS DIST REN A EDV RIGHT: 13.1 CM/S
BH CV XLRA MEAS DIST REN A PSV RIGHT: 52.5 CM/S
BH CV XLRA MEAS KID H RIGHT: 5 CM
BH CV XLRA MEAS KID L RIGHT: 11.2 CM
BH CV XLRA MEAS KID W RIGHT: 6.2 CM
BH CV XLRA MEAS MID REN A EDV RIGHT: 8.3 CM/S
BH CV XLRA MEAS MID REN A PSV RIGHT: 31.9 CM/S
BH CV XLRA MEAS PROX REN A EDV RIGHT: 12.6 CM/S
BH CV XLRA MEAS PROX REN A PSV RIGHT: 32.3 CM/S
BH CV XLRA MEAS RAR LEFT: 0.61
BH CV XLRA MEAS RAR RIGHT: 0.5
BUN SERPL-MCNC: 71 MG/DL (ref 8–23)
BUN/CREAT SERPL: 49.7 (ref 7–25)
CALCIUM SPEC-SCNC: 9 MG/DL (ref 8.6–10.5)
CHLORIDE SERPL-SCNC: 103 MMOL/L (ref 98–107)
CO2 SERPL-SCNC: 32 MMOL/L (ref 22–29)
CREAT SERPL-MCNC: 1.43 MG/DL (ref 0.57–1)
DEPRECATED RDW RBC AUTO: 55.8 FL (ref 37–54)
EGFRCR SERPLBLD CKD-EPI 2021: 36.5 ML/MIN/1.73
ERYTHROCYTE [DISTWIDTH] IN BLOOD BY AUTOMATED COUNT: 16.8 % (ref 12.3–15.4)
GLUCOSE BLDC GLUCOMTR-MCNC: 114 MG/DL (ref 70–130)
GLUCOSE BLDC GLUCOMTR-MCNC: 140 MG/DL (ref 70–130)
GLUCOSE BLDC GLUCOMTR-MCNC: 270 MG/DL (ref 70–130)
GLUCOSE BLDC GLUCOMTR-MCNC: 284 MG/DL (ref 70–130)
GLUCOSE BLDC GLUCOMTR-MCNC: 98 MG/DL (ref 70–130)
GLUCOSE SERPL-MCNC: 90 MG/DL (ref 65–99)
HCT VFR BLD AUTO: 29.5 % (ref 34–46.6)
HGB BLD-MCNC: 9.5 G/DL (ref 12–15.9)
LEFT KIDNEY WIDTH: 5.2 CM
LEFT RENAL UPPER PARENCHYMA MAX: 17 CM/S
LEFT RENAL UPPER PARENCHYMA MIN: 3 CM/S
LEFT RENAL UPPER PARENCHYMA RI: 0.82
MCH RBC QN AUTO: 29.5 PG (ref 26.6–33)
MCHC RBC AUTO-ENTMCNC: 32.2 G/DL (ref 31.5–35.7)
MCV RBC AUTO: 91.6 FL (ref 79–97)
PLATELET # BLD AUTO: 188 10*3/MM3 (ref 140–450)
PMV BLD AUTO: 13 FL (ref 6–12)
POTASSIUM SERPL-SCNC: 4.2 MMOL/L (ref 3.5–5.2)
RBC # BLD AUTO: 3.22 10*6/MM3 (ref 3.77–5.28)
RIGHT RENAL UPPER PARENCHYMA MAX: 14 CM/S
RIGHT RENAL UPPER PARENCHYMA MIN: 3 CM/S
RIGHT RENAL UPPER PARENCHYMA RI: 0.79
SODIUM SERPL-SCNC: 142 MMOL/L (ref 136–145)
WBC NRBC COR # BLD: 5.77 10*3/MM3 (ref 3.4–10.8)

## 2022-08-01 PROCEDURE — 82962 GLUCOSE BLOOD TEST: CPT

## 2022-08-01 PROCEDURE — 85027 COMPLETE CBC AUTOMATED: CPT | Performed by: INTERNAL MEDICINE

## 2022-08-01 PROCEDURE — P9047 ALBUMIN (HUMAN), 25%, 50ML: HCPCS | Performed by: INTERNAL MEDICINE

## 2022-08-01 PROCEDURE — 93975 VASCULAR STUDY: CPT

## 2022-08-01 PROCEDURE — 63710000001 INSULIN DETEMIR PER 5 UNITS: Performed by: PHYSICIAN ASSISTANT

## 2022-08-01 PROCEDURE — 25010000002 ALBUMIN HUMAN 25% PER 50 ML: Performed by: INTERNAL MEDICINE

## 2022-08-01 PROCEDURE — 25010000002 HEPARIN (PORCINE) PER 1000 UNITS: Performed by: INTERNAL MEDICINE

## 2022-08-01 PROCEDURE — 99232 SBSQ HOSP IP/OBS MODERATE 35: CPT | Performed by: PHYSICIAN ASSISTANT

## 2022-08-01 PROCEDURE — 80048 BASIC METABOLIC PNL TOTAL CA: CPT | Performed by: INTERNAL MEDICINE

## 2022-08-01 PROCEDURE — 97530 THERAPEUTIC ACTIVITIES: CPT

## 2022-08-01 PROCEDURE — 63710000001 INSULIN LISPRO (HUMAN) PER 5 UNITS: Performed by: PHYSICIAN ASSISTANT

## 2022-08-01 RX ORDER — AMOXICILLIN AND CLAVULANATE POTASSIUM 875; 125 MG/1; MG/1
1 TABLET, FILM COATED ORAL DAILY
Status: DISCONTINUED | OUTPATIENT
Start: 2022-08-01 | End: 2022-08-12 | Stop reason: HOSPADM

## 2022-08-01 RX ORDER — INSULIN LISPRO 100 [IU]/ML
0-7 INJECTION, SOLUTION INTRAVENOUS; SUBCUTANEOUS
Status: DISCONTINUED | OUTPATIENT
Start: 2022-08-01 | End: 2022-08-12 | Stop reason: HOSPADM

## 2022-08-01 RX ORDER — INSULIN LISPRO 100 [IU]/ML
8 INJECTION, SOLUTION INTRAVENOUS; SUBCUTANEOUS
Status: DISCONTINUED | OUTPATIENT
Start: 2022-08-01 | End: 2022-08-02

## 2022-08-01 RX ORDER — ALBUMIN (HUMAN) 12.5 G/50ML
12.5 SOLUTION INTRAVENOUS 2 TIMES DAILY
Status: COMPLETED | OUTPATIENT
Start: 2022-08-01 | End: 2022-08-02

## 2022-08-01 RX ORDER — ATORVASTATIN CALCIUM 40 MG/1
80 TABLET, FILM COATED ORAL NIGHTLY
Status: DISCONTINUED | OUTPATIENT
Start: 2022-08-02 | End: 2022-08-12 | Stop reason: HOSPADM

## 2022-08-01 RX ADMIN — DICLOFENAC 2 G: 10 GEL TOPICAL at 14:32

## 2022-08-01 RX ADMIN — ISOSORBIDE DINITRATE 20 MG: 20 TABLET ORAL at 14:25

## 2022-08-01 RX ADMIN — HYDRALAZINE HYDROCHLORIDE 25 MG: 25 TABLET, FILM COATED ORAL at 21:31

## 2022-08-01 RX ADMIN — SENNOSIDES AND DOCUSATE SODIUM 1 TABLET: 50; 8.6 TABLET ORAL at 20:02

## 2022-08-01 RX ADMIN — INSULIN LISPRO 4 UNITS: 100 INJECTION, SOLUTION INTRAVENOUS; SUBCUTANEOUS at 16:56

## 2022-08-01 RX ADMIN — SENNOSIDES AND DOCUSATE SODIUM 1 TABLET: 50; 8.6 TABLET ORAL at 14:25

## 2022-08-01 RX ADMIN — INSULIN DETEMIR 20 UNITS: 100 INJECTION, SOLUTION SUBCUTANEOUS at 10:04

## 2022-08-01 RX ADMIN — ALBUMIN HUMAN 12.5 G: 0.25 SOLUTION INTRAVENOUS at 08:05

## 2022-08-01 RX ADMIN — ALLOPURINOL 300 MG: 300 TABLET ORAL at 14:27

## 2022-08-01 RX ADMIN — Medication 10 ML: at 08:22

## 2022-08-01 RX ADMIN — DICLOFENAC 2 G: 10 GEL TOPICAL at 20:11

## 2022-08-01 RX ADMIN — PREGABALIN 50 MG: 50 CAPSULE ORAL at 14:25

## 2022-08-01 RX ADMIN — ISOSORBIDE DINITRATE 20 MG: 20 TABLET ORAL at 20:02

## 2022-08-01 RX ADMIN — CASTOR OIL AND BALSAM, PERU 1 APPLICATION: 788; 87 OINTMENT TOPICAL at 08:11

## 2022-08-01 RX ADMIN — LIDOCAINE 1 PATCH: 50 PATCH CUTANEOUS at 08:10

## 2022-08-01 RX ADMIN — ALBUMIN HUMAN 12.5 G: 0.25 SOLUTION INTRAVENOUS at 20:08

## 2022-08-01 RX ADMIN — ASPIRIN 81 MG: 81 TABLET, COATED ORAL at 14:27

## 2022-08-01 RX ADMIN — HEPARIN SODIUM 5000 UNITS: 5000 INJECTION INTRAVENOUS; SUBCUTANEOUS at 20:01

## 2022-08-01 RX ADMIN — HEPARIN SODIUM 5000 UNITS: 5000 INJECTION INTRAVENOUS; SUBCUTANEOUS at 08:03

## 2022-08-01 RX ADMIN — HYDRALAZINE HYDROCHLORIDE 25 MG: 25 TABLET, FILM COATED ORAL at 14:26

## 2022-08-01 RX ADMIN — AMOXICILLIN AND CLAVULANATE POTASSIUM 1 TABLET: 875; 125 TABLET, FILM COATED ORAL at 14:25

## 2022-08-01 RX ADMIN — METHOCARBAMOL 750 MG: 750 TABLET ORAL at 20:02

## 2022-08-01 RX ADMIN — CASTOR OIL AND BALSAM, PERU 1 APPLICATION: 788; 87 OINTMENT TOPICAL at 20:11

## 2022-08-01 RX ADMIN — PREGABALIN 50 MG: 50 CAPSULE ORAL at 20:08

## 2022-08-01 RX ADMIN — GUAIFENESIN AND DEXTROMETHORPHAN HYDROBROMIDE 1 TABLET: 600; 30 TABLET, EXTENDED RELEASE ORAL at 20:02

## 2022-08-01 RX ADMIN — VITAM B12 100 MCG: 100 TAB at 14:27

## 2022-08-01 RX ADMIN — BUMETANIDE 1 MG: 1 TABLET ORAL at 14:27

## 2022-08-01 RX ADMIN — AMLODIPINE BESYLATE 5 MG: 5 TABLET ORAL at 08:05

## 2022-08-01 RX ADMIN — Medication 10 ML: at 20:09

## 2022-08-01 RX ADMIN — INSULIN LISPRO 8 UNITS: 100 INJECTION, SOLUTION INTRAVENOUS; SUBCUTANEOUS at 16:55

## 2022-08-01 RX ADMIN — POLYETHYLENE GLYCOL 3350 17 G: 17 POWDER, FOR SOLUTION ORAL at 14:25

## 2022-08-01 NOTE — PROGRESS NOTES
Orthopedic Daily Progress Note      CC: Right knee pain    Pain seems to be slightly improved in the right knee.   She reports that she was out of bed to a chair yesterday.  She has had the MRI completed at this point      Physical Exam:  I have reviewed the vital signs.  Temp:  [97.8 °F (36.6 °C)-98 °F (36.7 °C)] 97.8 °F (36.6 °C)  Heart Rate:  [65-92] 92  Resp:  [18] 18  BP: (159-193)/(67-82) 159/67    Objective  General Appearance:    Alert, cooperative, no distress  Right Lower Extremity:  No obvious deformity, there is significant edema in the soft tissues of the lower leg particularly over the anterior aspect of the knee and in the prepatellar bursa area.  This is relatively unchanged. There is no associated erythema warmth or induration to suggest cellulitis or septic bursitis.  She does have significant pain with palpation in this area in the superficial tissues. Directly over the patella there is an area of of an abrasion which shows minimal nonpurulent type drainage like related to the underlying edema.    This appears to be slowly healing as expected.  She is able to fire her quads and straighten her leg today much better than she was 2 days ago.  She still has difficulty with full straight leg raise.  She has no pain with axial load through the lower extremity and knee.  I am able to flex and extend at the knee from about full extension to  45 degrees with really no pain at all.  No pain to varus and valgus stress.  Painless ROM hip,  ankle, compartments soft,       Results Review:    I have reviewed the labs, radiology results and diagnostic studies:    MRI shows no acute ligamentous injury.  There is some swelling around the knee as well as a moderate size effusion.  There are degenerative changes as seen on x-ray.    Results from last 7 days   Lab Units 08/01/22  0708   WBC 10*3/mm3 5.77   HEMOGLOBIN g/dL 9.5*   PLATELETS 10*3/mm3 188     Results from last 7 days   Lab Units 08/01/22  0708   SODIUM  mmol/L 142   POTASSIUM mmol/L 4.2   CO2 mmol/L 32.0*   CREATININE mg/dL 1.43*   GLUCOSE mg/dL 90       I have reviewed the medications.    Assessment/Problem List    82-year-old with acute injury to the right knee after fall.  She has fairly significant swelling in the soft tissues about the knee but I do not see evidence of infection either superficially in the soft tissues or evidence of deep infection intra-articularly.  I am able to move her knee joint without any pain which does not suggest any septic arthritis.    MRI does not suggest structural injury to the knee.  At this point she can be weightbearing as she tolerates.  She may do better with a knee immobilizer for stabilization when she is working with physical therapy.    She can be out of bed as she tolerates.  While in bed she should continue to elevate the leg for the swelling.  Currently has had no signs of infection. I do not think an attempt at aspiration of the knee is indicated.  I would be concerned about seeding the knee in the setting of low clinical concern.  I will sign off for now.  Please reconsult if any other concerns arise.  She will need to continue to work with physical therapy and likely will continue to need after discharge as well.    She can follow-up with me in the office in 2 to 3 weeks to ensure she is gradually improving.  Please call [phone].    Tiago Pearce MD  08/01/22  09:50 EDT

## 2022-08-01 NOTE — CASE MANAGEMENT/SOCIAL WORK
Discharge Planning Assessment  [facility]     Patient Name: Susan Anderson  MRN: [id number]  Today's Date: 8/1/2022    Admit Date: 7/26/2022     Discharge Needs Assessment    No documentation.                Discharge Plan     Row Name 08/01/22 1337       Plan    Plan [facility] pending acceptance    Patient/Family in Agreement with Plan yes    Plan Comments Followed up with Ms. Anderson and her daughter, Katie, at the bedside, for discharge planning.    Ms. Anderson is having a renal duplex today.  She is MRSA +, BCID.  Ms. Anderson was referred to [facility], and the facility is still reviewing and has not yet accepted Ms. Anderson for rehab admission.  A prior auth will be required for the rehab admission from Ms. Anderson's [facility] Medicare insurance.  [facility] is not [place] with [facility].    CM will follow up.    Final Discharge Disposition Code 03 - skilled nursing facility (SNF)              Continued Care and Services - Admitted Since 7/26/2022     Destination     Service Provider Request Status Selected Services Address Phone Fax Patient Preferred    [facility] SUBACUTE  Pending - No Request Sent N/A [address] [facility] [address] [phone] --    [facility] SWING BED UNIT  Declined  Out of network N/A [address], [facility] [address] [phone] --              Expected Discharge Date and Time     Expected Discharge Date Expected Discharge Time    Aug 2, 2022             Selena Elizabeth RN

## 2022-08-01 NOTE — PLAN OF CARE
Goal Outcome Evaluation:  Plan of Care Reviewed With: patient        Progress: no change  Outcome Evaluation: Pt limited by pain and stiffness this date. MaxA for rolling to the L and R to place lift device. DEP x 2 w/ lift device for bed > chair. Further transfer training deferred d/t fatigue and RLE pain. Continue to progress as able per current POC.

## 2022-08-01 NOTE — PLAN OF CARE
Goal Outcome Evaluation:           Progress: no change   VSS, on 1.5L NC. Voiding well. No c/o pain. Turned Q2. Slept some. Dressings as CDI.

## 2022-08-01 NOTE — TELEPHONE ENCOUNTER
From: Susan Anderson  To: Arleen Doherty MD  Sent: 7/31/2022 5:12 PM EDT  Subject: Anxiety, depression    Had a fall last week in the shower and am in [facility] currently. Hoping to keep my upcoming appointment with you depends on physical therapy in patient or not.     I’d like to start on something now to help my anxiety and depression. Bouts of crying and sadness, very worried about my health and my dog is in failing healthtoo- the thought of the grief is too much. My daughter is concerned. I told her I would ask you.

## 2022-08-01 NOTE — PROGRESS NOTES
[facility] Medicine Services  PROGRESS NOTE    Patient Name: Susan Anderson  : 1940  MRN: [id number]    Date of Admission: 2022  Primary Care Physician: Arleen Doherty MD    Subjective     CC: weakness    HPI:  Sitting in bed. She slept better last night than she did the previous night but still didn't sleep well. She is hesitant to try anything to help her sleep. Intermittent back spasms, better so far today. No abdominal pain, nausea or vomiting. Constipated.     ROS:  Gen- No fevers, chills  CV- No chest pain, palpitations  Resp- No cough, dyspnea  GI- No N/V/D, abd pain, (+) constipation    Objective     Vital Signs:   Temp:  [97.8 °F (36.6 °C)-98.2 °F (36.8 °C)] 98.2 °F (36.8 °C)  Heart Rate:  [65-92] 79  Resp:  [18] 18  BP: (155-193)/(67-82) 155/67  Flow (L/min):  [1.5] 1.5     Physical Exam:  Constitutional: No acute distress, awake, alert and conversant. Sitting up in bed. Chronically ill appearing.   HENT: NCAT, mucous membranes moist  Respiratory: Clear to auscultation bilaterally, respiratory effort normal. Normal respiratory effort   Cardiovascular: RRR, no murmurs, rubs, or gallops  Gastrointestinal: Positive bowel sounds, soft, nontender, nondistended  Musculoskeletal: 1-2+ pitting BLE edema   Psychiatric: Appropriate affect, cooperative  Neurologic: Oriented x 3, moves all extremities spontaneously without focal deficits, speech clear    Results Reviewed:  LAB RESULTS:      Lab 22  0708 22  0525 22  0743 22  1456 22  0427 22  2300   WBC 5.77 5.32 5.34 5.82 5.68 6.29   HEMOGLOBIN 9.5* 9.1* 8.8* 9.6* 10.3* 10.1*   HEMATOCRIT 29.5* 27.1* 27.5* 29.2* 30.1* 30.4*   PLATELETS 188 184 160 186 175 216   NEUTROS ABS  --   --   --  4.06 4.71 5.22   IMMATURE GRANS (ABS)  --   --   --  0.19*  --   --    LYMPHS ABS  --   --   --  0.86  --   --    MONOS ABS  --   --   --  0.61  --   --    EOS ABS  --   --   --  0.08 0.00 0.00   MCV  91.6 90.3 90.5 90.4 87.8 89.1   PROCALCITONIN  --   --   --   --   --  0.09   LACTATE  --   --   --   --   --  1.1         Lab 08/01/22  0708 07/31/22  1647 07/31/22  0525 07/30/22  0743 07/29/22  1456 07/28/22  0508 07/27/22  0427 07/26/22  2300   SODIUM 142  --  134* 134* 133* 135* 132* 137   POTASSIUM 4.2  --  4.2 5.0 4.7 4.2 4.2 4.3   CHLORIDE 103  --  98 97* 96* 97* 95* 98   CO2 32.0*  --  31.0* 25.0 30.0* 28.0 27.0 27.0   ANION GAP 7.0  --  5.0 12.0 7.0 10.0 10.0 12.0   BUN 71*  --  79* 90* 91* 91* 92* 93*   CREATININE 1.43*  --  1.62* 1.93* 1.73* 1.68* 1.71* 1.80*   EGFR 36.5*  --  31.4* 25.4* 29.0* 30.1* 29.4* 27.7*   GLUCOSE 90  --  87 249* 213* 113* 300* 323*   CALCIUM 9.0  --  8.3* 8.1* 8.5* 8.5* 8.8 8.9   MAGNESIUM  --   --   --   --   --   --  2.1  --    PHOSPHORUS  --  4.2  --   --   --   --  6.1*  --    HEMOGLOBIN A1C  --   --   --   --   --   --   --  9.00*   TSH  --   --   --   --   --   --  5.590* 3.430         Lab 07/31/22  0525 07/27/22  0427 07/26/22  2300   TOTAL PROTEIN 5.1* 6.3 6.1   ALBUMIN 2.80* 3.20* 3.10*   GLOBULIN 2.3 3.1 3.0   ALT (SGPT) 24 36* 37*   AST (SGOT) 29 42* 39*   BILIRUBIN 0.7 1.5* 1.5*  1.5*   INDIRECT BILIRUBIN  --   --  1.2   BILIRUBIN DIRECT  --   --  0.3   ALK PHOS 197* 234* 240*   LIPASE  --   --  30         Lab 07/27/22  0958 07/27/22 0427 07/26/22  2300   PROBNP  --   --  1,304.0   TROPONIN T 0.330* 0.374* 0.438*         Lab 07/27/22  0427 07/26/22  2300   IRON  --  48   IRON SATURATION  --  12*   TIBC  --  393   TRANSFERRIN  --  264   FERRITIN  --  40.61   FOLATE 13.80  --    VITAMIN B 12 >2,000*  --      Brief Urine Lab Results  (Last result in the past 365 days)      Color   Clarity   Blood   Leuk Est   Nitrite   Protein   CREAT   Urine HCG        07/31/22 1503             27.0             Microbiology Results Abnormal     Procedure Component Value - Date/Time    Body Fluid Culture - Body Fluid, Knee, Right [[id number]] Collected: 07/29/22 1400    Lab Status:  Preliminary result Specimen: Body Fluid from Knee, Right Updated: 08/01/22 0954     Body Fluid Culture No growth at 3 days     Gram Stain Rare (1+) WBCs seen      No organisms seen    Eosinophil Smear - Urine, Urine, Clean Catch [[id number]]  (Normal) Collected: 07/31/22 1503    Lab Status: Final result Specimen: Urine, Clean Catch Updated: 07/31/22 1627     Eosinophil Smear 0 % EOS/100 Cells     Narrative:      No eosinophil seen    Blood Culture - Blood, Arm, Right [[id number]]  (Normal) Collected: 07/29/22 1456    Lab Status: Preliminary result Specimen: Blood from Arm, Right Updated: 07/31/22 1515     Blood Culture No growth at 2 days    Blood Culture - Blood, Hand, Left [[id number]]  (Normal) Collected: 07/29/22 1456    Lab Status: Preliminary result Specimen: Blood from Hand, Left Updated: 07/31/22 1515     Blood Culture No growth at 2 days    COVID PRE-OP / PRE-PROCEDURE SCREENING ORDER (NO ISOLATION) - Swab, Nasopharynx [[id number]]  (Normal) Collected: 07/26/22 2300    Lab Status: Final result Specimen: Swab from Nasopharynx Updated: 07/26/22 2340    Narrative:      The following orders were created for panel order COVID PRE-OP / PRE-PROCEDURE SCREENING ORDER (NO ISOLATION) - Swab, Nasopharynx.  Procedure                               Abnormality         Status                     ---------                               -----------         ------                     COVID-19 and FLU A/B PCR...[[id number]]  Normal              Final result                 Please view results for these tests on the individual orders.    COVID-19 and FLU A/B PCR - Swab, Nasopharynx [[id number]]  (Normal) Collected: 07/26/22 2300    Lab Status: Final result Specimen: Swab from Nasopharynx Updated: 07/26/22 2340     COVID19 Not Detected     Influenza A PCR Not Detected     Influenza B PCR Not Detected    Narrative:      Fact sheet for providers: https://www.fda.gov/media/167354/download    Fact sheet for patients:  https://www.fda.gov/media/206263/download    Test performed by PCR.        MRI Knee Right Without Contrast    Result Date: 8/1/2022  EXAMINATION: MRI KNEE RIGHT  WO CONTRAST-  INDICATION: Knee replacement, infection suspected  TECHNIQUE: MRI of the right knee was obtained using standard imaging sequences in three orthogonal imaging planes. No intravenous or intra-articular contrast was administered.  COMPARISON: Radiographs 7/26/2022  FINDINGS:  The exam is limited owing to motion degradation of multiple sequences.  Bone marrow signal intensity appears within normal limits. No evidence of stress or traumatic fracture. No traumatic malalignment. No evidence of bony contusion. No evidence of discrete bony erosion or subchondral marrow edema.  There is a moderate knee joint effusion. As seen on prior radiographs there is a 11 mm ossific joint body in the posterior joint (series 10 image 20). No popliteal cyst. Unremarkable appearance of the posterior knee neurovasculature. There is diffuse circumferential soft tissue swelling and subcutaneous edema about the knee. There is a 1.2 x 3.9 x 5.6 cm prepatellar collection appearing intermediate in signal (series 11 image 20).  Assessment of the menisci is limited owing to motion degradation. Allowing for this there is evidence of abnormal increased signal and indistinctness of the posterior horn of the medial meniscus (series 11 image 24 and series 8 image 21) compatible with complex degenerative tearing and/or partial radial tear. There is indistinctness, irregularity, and peripheral extrusion of the posterior body and posterior horn of the lateral meniscus compatible with degenerative tearing (series 11 image 16 and series 8 image 19).  The posterior cruciate ligament and anterior cruciate ligament are poorly visualized. The posterior cruciate ligament appears grossly intact. The anterior cruciate ligament is very indistinct, difficult to distinguish whether this reflects  motion degradation artifact versus mucoid degeneration or chronic tear. There is no conspicuous anterior translation of the tibia relative to the distal femur. There are no kissing contusions or other associated findings to suggest recent/acute ACL injury.  The quadriceps tendon and patellar tendon are normal. No conspicuous anterior knee fat pad edema.  The regions of high-grade full-thickness chondral loss within the lateral compartment and regions of moderate to high-grade chondral loss within the medial compartment. Medial and lateral compartment marginal osteophytes are noted. No conspicuous subchondral cystic change or subchondral marrow edema. There are regions of high-grade full-thickness chondral loss within the patellofemoral compartment. Patellofemoral osteophytes are noted.       Impression:  No acute osseous findings.  There is a moderate knee joint effusion which is nonspecific. No obvious destructive joint space loss, discrete bony erosion, or subchondral marrow edema to suggest septic joint otherwise, however clinical correlation is required and consider joint fluid sampling as clinically indicated.  Diffuse circumferential soft tissue swelling/subcutaneous edema about the knee, including isointense prepatellar collection which may reflect posttraumatic versus infectious bursitis versus hematoma.  Moderate to severe tricompartmental osteoarthritic change. Complex degenerative tearing of the menisci.  Indistinctness of the anterior cruciate ligament, difficult to distinguish whether this reflects poor visualization from motion degradation artifact versus mucoid degeneration or chronic tear.  This report was finalized on 8/1/2022 8:31 AM by Kings Baez MD.      Results for orders placed during the hospital encounter of 07/26/22    Adult Transthoracic Echo Complete W/ Cont if Necessary Per Protocol    Interpretation Summary  · Normal left ventricular systolic function, estimated EF 55%.  · Aortic  sclerosis without aortic stenosis or regurgitation.  · Mild mitral regurgitation.    I have reviewed the medications:  Scheduled Meds:albumin human, 12.5 g, Intravenous, BID  allopurinol, 300 mg, Oral, Daily  amLODIPine, 5 mg, Oral, Daily  amoxicillin-clavulanate, 1 tablet, Oral, Daily  aspirin, 81 mg, Oral, Daily  bumetanide, 1 mg, Oral, Daily  castor oil-balsam peru, 1 application, Topical, Q12H  Diclofenac Sodium, 2 g, Topical, 4x Daily  heparin (porcine), 5,000 Units, Subcutaneous, Q12H  hydrALAZINE, 25 mg, Oral, Q8H  insulin detemir, 20 Units, Subcutaneous, Daily  insulin lispro, 0-7 Units, Subcutaneous, TID AC  Insulin Lispro, 8 Units, Subcutaneous, TID With Meals  isosorbide dinitrate, 20 mg, Oral, BID  lidocaine, 1 patch, Transdermal, Q24H  polyethylene glycol, 17 g, Oral, Daily  pregabalin, 50 mg, Oral, Q12H  senna-docusate sodium, 1 tablet, Oral, BID  sodium chloride, 10 mL, Intravenous, Q12H  vitamin B-12, 100 mcg, Oral, Daily      Continuous Infusions:   PRN Meds:.•  acetaminophen **OR** acetaminophen **OR** acetaminophen  •  dextrose  •  dextrose  •  glucagon (human recombinant)  •  guaifenesin-dextromethorphan  •  methocarbamol  •  ondansetron **OR** ondansetron  •  oxyCODONE  •  [COMPLETED] Insert peripheral IV **AND** sodium chloride  •  sodium chloride    Assessment & Plan     Active Hospital Problems    Diagnosis  POA   • **Weakness [R53.1]  Yes   • Anemia [D64.9]  Unknown   • Fall [W19.XXXA]  Unknown   • Dizziness [R42]  Unknown   • Acoustic neuroma (HCC) [D33.3]  Unknown   • Elevated troponin [R77.8]  Unknown   • CHF exacerbation ([facility]) [I50.9]  Unknown   • Elevated serum creatinine [R79.89]  Unknown   • NICM (nonischemic cardiomyopathy) ([facility]) [I42.8]  Yes   • Coronary artery disease involving native coronary artery of native heart without angina pectoris [I25.10]  Yes   • Dyslipidemia [E78.5]  Yes   • Chronic combined systolic and diastolic heart failure (HCC) [I50.42]  Yes   • Mitral valve  disease [I05.9]  Yes   • PVD (peripheral vascular disease) ([facility]) [I73.9]  Yes   • Essential hypertension [I10]  Yes   • CKD (chronic kidney disease), stage III ([facility]) [N18.30]  Yes      Resolved Hospital Problems   No resolved problems to display.     Brief Hospital Course to date:  Susan Anderson is a 82 y.o. female with PMH significant for HTN, HLD, CAD, non-ischemic cardiomyopathy, chronic combined systolic/diastolic CHF, CKD III, PVD, insulin-dependent DMII, ELIUD (on 2L NC QHS, chronic BLE edema, and acoustic neuroma. She was admitted to [facility] 7/26/22 for mild rhabdomyolysis after a fall at home. Also found to have a/c CHF exacerbation and LACEY.     Fall at home  Generalized weakness  - Fall at home in bathroom when she opted not to wait for her bath aide to help her  - Cardiology has evaluated and feel her falls are likely mechanical and not cardiac. May have some orthostasis. Patient does report she feels better with SBP in 150's, so allowing some permissive hypertension   - PT/OT following - referral to [facility] is pending     Rhabdomyolysis   -  on admission, s/p IV fluids     Acute on chronic combined systolic/diastolic CHF  Nonischemic cardiomyopathy   LACEY on CKD III  - Appreciate nephrology assistance  - Baseline creatinine 1.3-1.5  - Creatinine 1.9 on admission, has since returned to baseline. 1.43 today   - IV albumin with diuresis   - Fluid restriction per nephrology     Blood culture (+) MRSA   - 7/26 - 2 of 2 blood cultures (+) staph epidermis, 1 of 2 positive for MRSA  - Repeat blood cultures on 7/31 NGTD  - Appreciate ID assistance. No source of infection has been identified.  - Discussed with ID today, suspect contaminant. Stopping antibiotics for bacteremia.     Right knee effusion   - Orthopedic surgery has evaluated  - Joint aspiration not consistent with infection. Culture NGTD  - MRI of R knee does not suggest structural injury to the knee  - WBAT. Knee immobilizer on for  comfort  - Follow up with Dr. Pearce in 2-3 weeks     Low back spasms   - PRN Robaxin   - Did not tolerate Oxycodone (nightmares)    Chronic anemia   - PO iron supplement     Insulin-dependent DMII   Diabetic peripheral neuropathy  - Hgb A1c 9.0%  - Decrease Levemir to 20 units daily, increase Lispro to 8 units TID AC + SSI  - Continue Lyrica     History of R great toe osteomyelitis  - s/p TMA 4/2021 by Dr. Finney.   - Culture (+) MSSA  - On chronic suppressive Augmentin for ~1 year  - Antibiotics per ID     Dizziness  Acoustic neuroma, chronic  - ?contributing to falls  - Had initial w/u for neuroma at [facility]  - Has not had recent follow up imaging, consider MRI with/without contrast if GFR continues to improve prior to DC     Essential hypertension  - Beta blocker stopped due to bradycardia  - Will restart Lipitor now that Daptomycin has been stopped  - Continue Amlodipine 5mg daily, Bumex 1mg daily, Hydralazine 25mg TID, Isordil 20mg TID,      Constipation  - Continue Miralax / Senna  - May need to escalate further but will give her another 24-36 hours    Expected Discharge Location and Transportation: rehab via EMS or medical transport van   Expected Discharge Date: 8/4/22    DVT prophylaxis:Medical DVT prophylaxis orders are present.     AM-PAC 6 Clicks Score (PT): 13 (07/29/22 6922)    CODE STATUS:   Code Status and Medical Interventions:   Ordered at: 07/27/22 0149     Code Status (Patient has no pulse and is not breathing):    CPR (Attempt to Resuscitate)     Medical Interventions (Patient has pulse or is breathing):    Full Support     Anna Crystal PA-C  08/01/22

## 2022-08-01 NOTE — PROGRESS NOTES
"   LOS: 5 days    Patient Care Team:  Arleen Doherty MD as PCP - General (Internal Medicine)  Flory Sauer APRN (Nurse Practitioner)  Janeth Lam MD as Consulting Physician (Endocrinology)  Anjum Ceballos MD as Consulting Physician (Cardiology)    Chief Complaint:  SOA    Subjective     Interval History:     No acute events overnight. No new complaints     Review of Systems:   No CP or SOA , fever, chills, rigors, rash, N/V, Constipation.       Objective     Vital Sign Min/Max for last 24 hours  Temp  Min: 97.8 °F (36.6 °C)  Max: 98 °F (36.7 °C)   BP  Min: 159/67  Max: 193/82   Pulse  Min: 65  Max: 92   Resp  Min: 18  Max: 18   SpO2  Min: 94 %  Max: 95 %   Flow (L/min)  Min: 1.5  Max: 1.5   Weight  Min: 93.3 kg (205 lb 9.6 oz)  Max: 93.3 kg (205 lb 9.6 oz)     Flowsheet Rows    Flowsheet Row First Filed Value   Admission Height 160 cm (63\") Documented at 07/26/2022 2039   Admission Weight 90.7 kg (200 lb) Documented at 07/26/2022 2039          I/O this shift:  In: -   Out: 250 [Urine:250]  I/O last 3 completed shifts:  In: 480 [P.O.:480]  Out: 2450 [Urine:2450]    Physical Exam:    Gen: Alert, NAD   HENT: NC, AT, EOMI   NECK: Supple, no JVD, Trachea midline   LUNGS: CTA bilaterally, non labored respirtation   CVS: S1/S2 audible, RRR, no murmur   Abd: Soft, NT, ND, BS+   Ext: + pedal edema, no cyanosis   CNS: Alert, No focal deficit noted grossly  Psy: Cooperative  Skin: Warm, dry and intact      WBC WBC   Date Value Ref Range Status   08/01/2022 5.77 3.40 - 10.80 10*3/mm3 Final   07/31/2022 5.32 3.40 - 10.80 10*3/mm3 Final   07/30/2022 5.34 3.40 - 10.80 10*3/mm3 Final   07/29/2022 5.82 3.40 - 10.80 10*3/mm3 Final      HGB Hemoglobin   Date Value Ref Range Status   08/01/2022 9.5 (L) 12.0 - 15.9 g/dL Final   07/31/2022 9.1 (L) 12.0 - 15.9 g/dL Final   07/30/2022 8.8 (L) 12.0 - 15.9 g/dL Final   07/29/2022 9.6 (L) 12.0 - 15.9 g/dL Final      HCT Hematocrit   Date Value Ref Range Status   08/01/2022 " 29.5 (L) 34.0 - 46.6 % Final   07/31/2022 27.1 (L) 34.0 - 46.6 % Final   07/30/2022 27.5 (L) 34.0 - 46.6 % Final   07/29/2022 29.2 (L) 34.0 - 46.6 % Final      Platlets No results found for: LABPLAT   MCV MCV   Date Value Ref Range Status   08/01/2022 91.6 79.0 - 97.0 fL Final   07/31/2022 90.3 79.0 - 97.0 fL Final   07/30/2022 90.5 79.0 - 97.0 fL Final   07/29/2022 90.4 79.0 - 97.0 fL Final          Sodium Sodium   Date Value Ref Range Status   08/01/2022 142 136 - 145 mmol/L Final   07/31/2022 134 (L) 136 - 145 mmol/L Final   07/30/2022 134 (L) 136 - 145 mmol/L Final   07/29/2022 133 (L) 136 - 145 mmol/L Final      Potassium Potassium   Date Value Ref Range Status   08/01/2022 4.2 3.5 - 5.2 mmol/L Final   07/31/2022 4.2 3.5 - 5.2 mmol/L Final   07/30/2022 5.0 3.5 - 5.2 mmol/L Final   07/29/2022 4.7 3.5 - 5.2 mmol/L Final      Chloride Chloride   Date Value Ref Range Status   08/01/2022 103 98 - 107 mmol/L Final   07/31/2022 98 98 - 107 mmol/L Final   07/30/2022 97 (L) 98 - 107 mmol/L Final   07/29/2022 96 (L) 98 - 107 mmol/L Final      CO2 CO2   Date Value Ref Range Status   08/01/2022 32.0 (H) 22.0 - 29.0 mmol/L Final   07/31/2022 31.0 (H) 22.0 - 29.0 mmol/L Final   07/30/2022 25.0 22.0 - 29.0 mmol/L Final   07/29/2022 30.0 (H) 22.0 - 29.0 mmol/L Final      BUN BUN   Date Value Ref Range Status   08/01/2022 71 (H) 8 - 23 mg/dL Final   07/31/2022 79 (H) 8 - 23 mg/dL Final   07/30/2022 90 (H) 8 - 23 mg/dL Final   07/29/2022 91 (H) 8 - 23 mg/dL Final      Creatinine Creatinine   Date Value Ref Range Status   08/01/2022 1.43 (H) 0.57 - 1.00 mg/dL Final   07/31/2022 1.62 (H) 0.57 - 1.00 mg/dL Final   07/30/2022 1.93 (H) 0.57 - 1.00 mg/dL Final   07/29/2022 1.73 (H) 0.57 - 1.00 mg/dL Final      Calcium Calcium   Date Value Ref Range Status   08/01/2022 9.0 8.6 - 10.5 mg/dL Final   07/31/2022 8.3 (L) 8.6 - 10.5 mg/dL Final   07/30/2022 8.1 (L) 8.6 - 10.5 mg/dL Final   07/29/2022 8.5 (L) 8.6 - 10.5 mg/dL Final      PO4  No results found for: CAPO4   Albumin Albumin   Date Value Ref Range Status   07/31/2022 2.80 (L) 3.50 - 5.20 g/dL Final      Magnesium No results found for: MG   Uric Acid Uric Acid   Date Value Ref Range Status   07/31/2022 4.2 2.4 - 5.7 mg/dL Final     Comment:     Falsely depressed results may occur on samples drawn from patients receiving N-Acetylcysteine (NAC) or Metamizole.           Results Review:     I reviewed the patient's new clinical results.    allopurinol, 300 mg, Oral, Daily  amLODIPine, 5 mg, Oral, Daily  aspirin, 81 mg, Oral, Daily  bumetanide, 1 mg, Oral, Daily  castor oil-balsam peru, 1 application, Topical, Q12H  DAPTOmycin, 8 mg/kg (Adjusted), Intravenous, Q48H  Diclofenac Sodium, 2 g, Topical, 4x Daily  heparin (porcine), 5,000 Units, Subcutaneous, Q12H  hydrALAZINE, 25 mg, Oral, Q8H  insulin detemir, 20 Units, Subcutaneous, Daily  insulin lispro, 0-7 Units, Subcutaneous, TID AC  Insulin Lispro, 8 Units, Subcutaneous, TID With Meals  isosorbide dinitrate, 20 mg, Oral, BID  lidocaine, 1 patch, Transdermal, Q24H  polyethylene glycol, 17 g, Oral, Daily  pregabalin, 50 mg, Oral, Q12H  senna-docusate sodium, 1 tablet, Oral, BID  sodium chloride, 10 mL, Intravenous, Q12H  vitamin B-12, 100 mcg, Oral, Daily           Medication Review:     Assessment & Plan       Weakness    PVD (peripheral vascular disease) ([facility])    Essential hypertension    CKD (chronic kidney disease), stage III ([facility])    Mitral valve disease    NICM (nonischemic cardiomyopathy) ([facility])    Coronary artery disease involving native coronary artery of native heart without angina pectoris    Dyslipidemia    Chronic combined systolic and diastolic heart failure ([facility])    Anemia    Fall    Dizziness    Acoustic neuroma ([facility])    Elevated troponin    CHF exacerbation ([facility])    Elevated serum creatinine      1- LACEY - non oliguric - Scr 1.9 at presentation - Resolved. FeUrea- 31% suggestive of pre-renal etiology. Renal duplex pending.   2- CKD  stage III -baseline Scr 1.3-1.5 range. 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 Not a candidate for SGLT 2 inhibitor.   5- mild hyponatremia - resolved.   6- Anemia - tsat 12%   7- DM type II - not controlled A1c 9.0%- on insulin.   8- Volume overload - improving.   9- low albumin level      Plan    - Continue with diuretics with albumin infusion.   - Fluid restriction 1.2 lit/day   - Oral iron supplement   - Renal diet.   - Adjust meds per renal function   - No emergent need of RRT.       Jamie Gant MD  08/01/22  09:46 EDT

## 2022-08-01 NOTE — PROGRESS NOTES
INFECTIOUS DISEASE  Progress Note    Susan Anderson  1940  [id number]    Admission Date: 7/26/2022      Requesting Provider: Brigid Seo MD  Evaluating Physician: Stef Pete MD    Reason for Consultation: BCID positive for MRSA, recs on abx- CKD currently on vanc    History of present illness:    7/29/22: Patient is a 82 y.o. female with h/o T2DM, CHF, CKD Stage III, HTN, chronic BLE edema, chronic right lymphedema, ELIUD/nocturnal 2L O2 NC, acoustic neuroma, CAD, T2DM, gout, HLD, asthma, PVD/right great toe TMA 4/2021/now on oral Augmentin therapy for 6 to 12 weeks from 5/19/22, cervical cancer/hysterectomy, and chronic mixed diastolic/systolic CHF, and NICM with EF 40-45% who we were asked to see for MRSA bacteremia.  The patient presented to [facility] ED on 7/26 after falling at home and pinned between the toilet and her wheelchair.  She lives alone and is unsure what happened, but states that she did not lose consciousness.  She had episode of severe diarrhea before and after falling.  Her daughter found her on the floor and called EMS.  She developed a spasm in her left lower back radiating to her hip and LLE making it difficult to get her up off the floor.  He also scraped her right knee.  Her bumex was recently doubled for 3 days for BLE edema.  She is not very mobile or independent requiring wheelchair for mobilization and requires assistance for showering.  She denies fever, chills, shortness of breath, nausea, vomiting, diarrhea, or dysuria.  She has had some nasal congestion.  On arrival to ED, her Tlow was 93.9 and her HR was bradycardic.  Admitting labs were A1C 9.0, WBC 6300 with 81% segs/2% bands, , creatinine 1.8 (baseline around 1.2-1.4), PCT 0.09, lactic acid 1.1, and UA WBC 0-2.  Blood cultures are positive in 2 of 2 sets (4 of 4 bottles) with GPC and BCID positive for MRSA and CoNS.  A COVID-19/Flu PCR was negative.  Imaging showed no evidence of acute fractures.  She had some  swelling of right side of periorbital/cheek soft tissue, chronic right maxillary sinusitis, mild pulmonary edema, subcutaneous edema of right chest and lateral abdominal wall with small volume ascites similar to 7/13/22, and prepatellar soft tissue swelling c/w hematoma or bursitis.  A CT scan of head showed no bleeds. She is currently on Vancomycin.  ID was asked to evaluate and manage her antibiotic therapy.    Her daughter indicates that she was lying on the floor at home for least 10 hours, unable to arise.  Her usual caretaker did not come to the house that day.    7/30/22: She has remained afebrile. Repeat blood cultures from 7/29 are pending.  Right prepatellar aspirate Gram stain revealed rare white blood cells with no organisms seen. Blood cultures from 726 have grown Staph epidermidis in both sets and MRSA in 1 set. Echocardiogram yesterday revealed mild mitral regurgitation.  She continues to complain of severe right knee pain.  Orthopedics has ordered an MRI scan.    8/1/22: She has remained afebrile. Follow-up blood cultures from 7/29 at been negative.  Right prepatellar aspirate culture is negative so far.  MRI scan of the right knee Revealed a moderate knee effusion which was nonspecific.  She had diffuse circumferential soft tissue swelling with isointense prepatellar collection consistent with hematoma versus infectious bursitis versus posttraumatic.  She was also noted to have complex degenerative tearing of the menisci.  She had an indistinct anterior cruciate ligament. White blood cell count today is 5.8.  Creatinine is 1.43.  She feels better today.  She has some improvement in her right knee pain.         Past Medical History:   Diagnosis Date   • Arthritis    • Asthma 6/4 - double pneumonia    Currently on inhaler and nebulizer   • Cancer ([facility])     cervical cancer, skin cancer   • CHF (congestive heart failure) ([facility]) June 4, 2021   • Chronic kidney disease Related to diabetes   • Coronary  artery disease 6/4/2021 DX for hear failure   • Diabetes mellitus (HCC) 30 years    Seeing Dr. Lam 1st time Aug 19   • Gout    • Hx of colonoscopy    • Hyperlipidemia Reference current labs x 2-3yrs approx   • Hypertension 30 years   • Migraine    • Mitral valve disease    • Mitral valve disease    • Osteomyelitis (HCC)    • Peripheral neuropathy    • Sleep apnea    • Type 2 diabetes mellitus (HCC)     30 years       Past Surgical History:   Procedure Laterality Date   • ABDOMINAL HYSTERECTOMY W/SALPINGECTOMY     • AMPUTATION  Right great toe, 1st 1/3 metatarsal -[place] 4/14/21   • AORTAGRAM N/A 4/9/2021    Procedure: AORTAGRAM WITH OR WITHOUT RUNOFFS, WITH Co2;  Surgeon: Trey Forrest MD;  Location:  [facility];  Service: Vascular;  Laterality: N/A;   • APPENDECTOMY     • BRAIN TUMOR EXCISION      laser surgery    • CARDIAC CATHETERIZATION N/A 6/21/2021    Procedure: LEFT HEART CATH;  Surgeon: Anjum Ceballos MD;  Location:  [facility];  Service: Cardiology;  Laterality: N/A;   • CATARACT EXTRACTION W/ INTRAOCULAR LENS  IMPLANT, BILATERAL     • CHOLECYSTECTOMY     • EYE SURGERY     • HYSTERECTOMY     • TOE SURGERY     • TRANS METATARSAL AMPUTATION Right 4/14/2021    Procedure: AMPUTATION TRANS METATARSAL RIGHT GREAT TOE;  Surgeon: Valdez Finney MD;  Location:  [facility];  Service: Vascular;  Laterality: Right;       Family History   Problem Relation Age of Onset   • Diabetes Mother         Type II   • Heart disease Father    • Heart attack Father    • Coronary artery disease Father    • Diabetes Father         Type II   • Diabetes Sister    • Diabetes Maternal Grandmother    • Diabetes Maternal Grandfather    • Diabetes Paternal Grandmother    • Diabetes Paternal Grandfather        Social History     Socioeconomic History   • Marital status:    • Number of children: 1   Tobacco Use   • Smoking status: Never Smoker   • Smokeless tobacco: Never Used   Vaping Use   • Vaping Use: Never  used   Substance and Sexual Activity   • Alcohol use: Yes     Comment: Social drinking when not recovering from hospitalization   • Drug use: Never   • Sexual activity: Not Currently     Birth control/protection: None       Allergies   Allergen Reactions   • Cephalexin Nausea Only   • Erythromycin Base Nausea Only   • Oxycodone Nausea Only   • Sulfa Antibiotics Nausea Only         Medication:    Current Facility-Administered Medications:   •  acetaminophen (TYLENOL) tablet 650 mg, 650 mg, Oral, Q4H PRN, 650 mg at 07/31/22 1243 **OR** acetaminophen (TYLENOL) 160 MG/5ML solution 650 mg, 650 mg, Oral, Q4H PRN **OR** acetaminophen (TYLENOL) suppository 650 mg, 650 mg, Rectal, Q4H PRN, [facility], Chey G, DO  •  allopurinol (ZYLOPRIM) tablet 300 mg, 300 mg, Oral, Daily, [facility], Chey G, DO, 300 mg at 07/31/22 0912  •  amLODIPine (NORVASC) tablet 5 mg, 5 mg, Oral, Daily, [facility], Chey G, DO, 5 mg at 08/01/22 0805  •  aspirin EC tablet 81 mg, 81 mg, Oral, Daily, [facility], Chey G, DO, 81 mg at 07/31/22 0912  •  bumetanide (BUMEX) tablet 1 mg, 1 mg, Oral, Daily, Anjum Ceballos MD, 1 mg at 07/31/22 0911  •  castor oil-balsam peru (VENELEX) ointment 1 application, 1 application, Topical, Q12H, Brigid Seo MD, 1 application at 08/01/22 0811  •  DAPTOmycin (CUBICIN) 600 mg in sodium chloride 0.9 % 50 mL IVPB, 8 mg/kg (Adjusted), Intravenous, Q48H, Neeraj Bernstein PA, Last Rate: 100 mL/hr at 07/31/22 1508, 600 mg at 07/31/22 1508  •  dextrose (D50W) (25 g/50 mL) IV injection 25 g, 25 g, Intravenous, Q15 Min PRN, [facility], Chey G, DO  •  dextrose (GLUTOSE) oral gel 15 g, 15 g, Oral, Q15 Min PRN, Breana, Chey G, DO  •  Diclofenac Sodium (VOLTAREN) 1 % gel 2 g, 2 g, Topical, 4x Daily, Brigid Seo MD, 2 g at 07/31/22 2214  •  glucagon (human recombinant) (GLUCAGEN DIAGNOSTIC) injection 1 mg, 1 mg, Intramuscular, Q15 Min PRN, Chey Toussaint, DO  •  guaifenesin-dextromethorphan (MUCINEX DM) tablet 1 tablet, 1 tablet, Oral, BID PRN,  Brigid Seo MD, 1 tablet at 07/28/22 1108  •  heparin (porcine) 5000 UNIT/ML injection 5,000 Units, 5,000 Units, Subcutaneous, Q12H, BreanaMini hawthornesie G, DO, 5,000 Units at 08/01/22 0803  •  hydrALAZINE (APRESOLINE) tablet 25 mg, 25 mg, Oral, Q8H, Chey, Ruddy, DO, 25 mg at 07/31/22 2203  •  insulin detemir (LEVEMIR) injection 20 Units, 20 Units, Subcutaneous, Daily, Anna Crystal PA-C  •  Insulin Lispro (humaLOG) injection 0-7 Units, 0-7 Units, Subcutaneous, TID AC, Anna Crystal PA-C  •  Insulin Lispro (humaLOG) injection 8 Units, 8 Units, Subcutaneous, TID With Meals, Anna Crystal PA-C  •  isosorbide dinitrate (ISORDIL) tablet 20 mg, 20 mg, Oral, BID, [facility], Chey G, DO, 20 mg at 07/31/22 2202  •  lidocaine (LIDODERM) 5 % 1 patch, 1 patch, Transdermal, Q24H, [facility], Chey G, DO, 1 patch at 08/01/22 0810  •  methocarbamol (ROBAXIN) tablet 750 mg, 750 mg, Oral, Q6H PRN, Chey, Ruddy, DO, 750 mg at 07/31/22 1243  •  ondansetron (ZOFRAN) tablet 4 mg, 4 mg, Oral, Q6H PRN **OR** ondansetron (ZOFRAN) injection 4 mg, 4 mg, Intravenous, Q6H PRN, Bre[facility], Chey G, DO, 4 mg at 07/28/22 1421  •  oxyCODONE (ROXICODONE) immediate release tablet 2.5 mg, 2.5 mg, Oral, Q4H PRN, Chey Ruddy, DO  •  polyethylene glycol (MIRALAX) packet 17 g, 17 g, Oral, Daily, Chey Ruddy, DO, 17 g at 07/31/22 1242  •  pregabalin (LYRICA) capsule 50 mg, 50 mg, Oral, Q12H, Bre[facility], Chey G, DO, 50 mg at 07/31/22 2202  •  sennosides-docusate (PERICOLACE) 8.6-50 MG per tablet 1 tablet, 1 tablet, Oral, BID, Ruddy Guerrier DO, 1 tablet at 07/31/22 2202  •  [COMPLETED] Insert peripheral IV, , , Once **AND** sodium chloride 0.9 % flush 10 mL, 10 mL, Intravenous, PRN, BreanaMini hawthornesie G, DO  •  sodium chloride 0.9 % flush 10 mL, 10 mL, Intravenous, Q12H, BreanaMini hawthornesie G, DO, 10 mL at 08/01/22 0822  •  sodium chloride 0.9 % flush 10 mL, 10 mL, Intravenous, PRN, BreanaMini hawthornesie G, DO  •  vitamin B-12 (CYANOCOBALAMIN) tablet 100 mcg,  100 mcg, Oral, Daily, Chey Toussaint DO GM, 100 mcg at 22 0912    Antibiotics:  Anti-Infectives (From admission, onward)    Ordered     Dose/Rate Route Frequency Start Stop    22 1346  DAPTOmycin (CUBICIN) 600 mg in sodium chloride 0.9 % 50 mL IVPB        Ordering Provider: Neeraj Bernstein PA    8 mg/kg × 73.4 kg (Adjusted)  100 mL/hr over 30 Minutes Intravenous Every 48 Hours 22 1500 22 1459    22 0859  vancomycin (VANCOCIN) 1000 mg/200 mL dextrose 5% IVPB        Ordering Provider: Tom Phelan PharmD    10 mg/kg × 104 kg  over 60 Minutes Intravenous Once 22 1000 22 1028    22 1824  vancomycin 1750 mg/500 mL 0.9% NS IVPB ([facility])        Ordering Provider: Adalid Mcrae RPH    20 mg/kg × 90.7 kg  over 105 Minutes Intravenous Once 22 1915 22 2204            Review of Systems:  See HPI    Physical Exam:   Vital Signs  Temp (24hrs), Av.9 °F (36.6 °C), Min:97.8 °F (36.6 °C), Max:98 °F (36.7 °C)    Temp  Min: 97.8 °F (36.6 °C)  Max: 98 °F (36.7 °C)  BP  Min: 159/67  Max: 193/82  Pulse  Min: 65  Max: 92  Resp  Min: 18  Max: 18  SpO2  Min: 94 %  Max: 95 %    GENERAL: She is drowsy but in no acute distress  HEENT: Normocephalic, atraumatic.  PERRL. EOMI. No conjunctival injection. No icterus. Oropharynx clear without evidence of thrush or exudate.   NECK: Supple   HEART: RRR; No murmur, rubs, gallops.   LUNGS: Clear to auscultation bilaterally without wheezing, rales, rhonchi. Normal respiratory effort. Nonlabored. No dullness.  ABDOMEN: Soft, nontender, nondistended. No rebound or guarding. NO mass or HSM.  EXT:  No cyanosis, clubbing or edema. No cord.  :  Without Bui catheter.  MSK: She has some fluctuance, tenderness, swelling of the right prepatellar bursa region.  There is decreased tenderness over the right medial anterior knee.  She has a 3-4 cm abrasion over her right prepatellar region with some slough but no cellulitis.  SKIN: Warm and dry  without cutaneous eruptions on Inspection/palpation.    NEURO: Drowsy but arousable.  She moves all of her extremities.    Laboratory Data    Results from last 7 days   Lab Units 08/01/22  0708 07/31/22  0525 07/30/22  0743   WBC 10*3/mm3 5.77 5.32 5.34   HEMOGLOBIN g/dL 9.5* 9.1* 8.8*   HEMATOCRIT % 29.5* 27.1* 27.5*   PLATELETS 10*3/mm3 188 184 160     Results from last 7 days   Lab Units 08/01/22  0708   SODIUM mmol/L 142   POTASSIUM mmol/L 4.2   CHLORIDE mmol/L 103   CO2 mmol/L 32.0*   BUN mg/dL 71*   CREATININE mg/dL 1.43*   GLUCOSE mg/dL 90   CALCIUM mg/dL 9.0     Results from last 7 days   Lab Units 07/31/22  0525 07/27/22  0427 07/26/22  2300   ALK PHOS U/L 197*   < > 240*   BILIRUBIN mg/dL 0.7   < > 1.5*  1.5*   BILIRUBIN DIRECT mg/dL  --   --  0.3   ALT (SGPT) U/L 24   < > 37*   AST (SGOT) U/L 29   < > 39*    < > = values in this interval not displayed.             Results from last 7 days   Lab Units 07/26/22  2300   LACTATE mmol/L 1.1     Results from last 7 days   Lab Units 07/31/22  1647 07/29/22  1456 07/27/22  0427   CK TOTAL U/L 344* 334* 720*     Results from last 7 days   Lab Units 07/28/22  0508   VANCOMYCIN RM mcg/mL 15.40     Estimated Creatinine Clearance: 32.4 mL/min (A) (by C-G formula based on SCr of 1.43 mg/dL (H)).      Microbiology:  Microbiology Results (last 10 days)     Procedure Component Value - Date/Time    Eosinophil Smear - Urine, Urine, Clean Catch [[id number]]  (Normal) Collected: 07/31/22 1503    Lab Status: Final result Specimen: Urine, Clean Catch Updated: 07/31/22 1627     Eosinophil Smear 0 % EOS/100 Cells     Narrative:      No eosinophil seen    Blood Culture - Blood, Arm, Right [[id number]]  (Normal) Collected: 07/29/22 1456    Lab Status: Preliminary result Specimen: Blood from Arm, Right Updated: 07/31/22 1515     Blood Culture No growth at 2 days    Blood Culture - Blood, Hand, Left [[id number]]  (Normal) Collected: 07/29/22 1456    Lab Status: Preliminary result  Specimen: Blood from Hand, Left Updated: 07/31/22 1515     Blood Culture No growth at 2 days    Body Fluid Culture - Body Fluid, Knee, Right [[id number]] Collected: 07/29/22 1400    Lab Status: Preliminary result Specimen: Body Fluid from Knee, Right Updated: 07/31/22 0946     Body Fluid Culture No growth at 2 days     Gram Stain Rare (1+) WBCs seen      No organisms seen    COVID PRE-OP / PRE-PROCEDURE SCREENING ORDER (NO ISOLATION) - Swab, Nasopharynx [[id number]]  (Normal) Collected: 07/26/22 2300    Lab Status: Final result Specimen: Swab from Nasopharynx Updated: 07/26/22 2340    Narrative:      The following orders were created for panel order COVID PRE-OP / PRE-PROCEDURE SCREENING ORDER (NO ISOLATION) - Swab, Nasopharynx.  Procedure                               Abnormality         Status                     ---------                               -----------         ------                     COVID-19 and FLU A/B PCR...[[id number]]  Normal              Final result                 Please view results for these tests on the individual orders.    Blood Culture - Blood, Arm, Left [[id number]]  (Abnormal)  (Susceptibility) Collected: 07/26/22 2300    Lab Status: Edited Result - FINAL Specimen: Blood from Arm, Left Updated: 07/30/22 0819     Blood Culture Staphylococcus aureus, MRSA     Comment: Infectious disease consultation is highly recommended to rule out distant foci of infection.  Methicillin resistant Staphylococcus aureus, Patient may be an isolation risk.        Isolated from Aerobic Bottle     Blood Culture Staphylococcus epidermidis     Isolated from Aerobic and Anaerobic Bottles     Gram Stain Aerobic Bottle Gram positive cocci in groups      Anaerobic Bottle Gram positive cocci in groups    Susceptibility      Staphylococcus aureus, MRSA      NAIMA      Daptomycin Susceptible (C)  [1]       Gentamicin Susceptible      Oxacillin Resistant     Rifampin Susceptible      Vancomycin Susceptible                    [1]  Appended report. These results have been appended to a previously final verified report.             Susceptibility      Staphylococcus epidermidis      NAIMA      Oxacillin Resistant     Vancomycin Susceptible                       Susceptibility Comments     Staphylococcus aureus, MRSA    Dapto requested by Dr. Pete 7/30  This isolate does not demonstrate inducible clindamycin resistance in vitro.      Staphylococcus epidermidis    This isolate is presumed to be clindamycin resistant based on detection of inducible clindamycin resistance.  Clindamycin may still be effective in some patients.             Blood Culture - Blood, Arm, Right [[id number]]  (Abnormal) Collected: 07/26/22 2300    Lab Status: Final result Specimen: Blood from Arm, Right Updated: 07/30/22 0623     Blood Culture Staphylococcus epidermidis     Isolated from Aerobic and Anaerobic Bottles     Gram Stain Anaerobic Bottle Gram positive cocci in groups      Aerobic Bottle Gram positive cocci in groups    Narrative:      Refer to previous blood culture collected on 7/26/2022 2300 for MICs    COVID-19 and FLU A/B PCR - Swab, Nasopharynx [[id number]]  (Normal) Collected: 07/26/22 2300    Lab Status: Final result Specimen: Swab from Nasopharynx Updated: 07/26/22 2340     COVID19 Not Detected     Influenza A PCR Not Detected     Influenza B PCR Not Detected    Narrative:      Fact sheet for providers: https://www.fda.gov/media/676118/download    Fact sheet for patients: https://www.fda.gov/media/966468/download    Test performed by PCR.    Blood Culture ID, PCR - Blood, Arm, Left [[id number]]  (Abnormal) Collected: 07/26/22 2300    Lab Status: Final result Specimen: Blood from Arm, Left Updated: 07/27/22 1811     BCID, PCR Staph aureus. mecA/C and MREJ (methicillin resistance gene) detected. Identification by BCID2 PCR.     BCID, PCR 2 Staph spp, not aureus or lugdunesis. Identification by BCID2 PCR.    Narrative:      Infectious disease  consultation is highly recommended to rule out distant foci of infection.                Radiology:  CT Abdomen Pelvis Without Contrast    Result Date: 7/26/2022  1. Asymmetric subcutaneous edema involving the right chest and right lateral abdominal wall. This could be related to chronic patient positioning or soft tissue contusion. Correlate clinically for bruising along the right side. 2. Cardiomegaly with small right-sided pleural effusion. 3. Small volume ascites within the abdomen and pelvis, similar to the prior CT from 07/13/2022. 4. No CT findings of solid organ injury within the abdomen or pelvis within the limitations of noncontrast technique.    This report was finalized on 7/26/2022 10:46 PM by Cody Diaz MD.      XR Elbow 2 View Right    Result Date: 7/26/2022  1. No acute osseous abnormality of the right elbow. 2. Soft tissue swelling surrounding the elbow.  This report was finalized on 7/26/2022 10:39 PM by Cody Diaz MD.      XR Knee 1 or 2 View Right    Result Date: 7/26/2022  1. No acute fracture or dislocation. 2. Tricompartmental degenerative joint disease most notable within the patellofemoral and lateral compartments of the knee. 3. Prepatellar soft tissue swelling which could relate to hematoma or prepatellar bursitis. Probable small suprapatellar joint effusion is also present.  This report was finalized on 7/26/2022 11:01 PM by Cody Diaz MD.      CT Head Without Contrast    Result Date: 7/26/2022  1. No acute intracranial abnormality. Specifically, no evidence of hemorrhage, mass effect or midline shift 2. Mild air-fluid level within the right maxillary sinus which can be seen with acute or chronic sinus disease.  This report was finalized on 7/26/2022 10:31 PM by Cody Diaz MD.      CT Lumbar Spine Without Contrast    Result Date: 7/26/2022  1. No evidence of acute fracture. 2. Grade 1 anterolisthesis of L4 on L5 secondary to degenerative facet arthropathy. There is  likely at least moderate spinal canal stenosis secondary to disc and facet arthropathy. 3. Severe degenerative disc height loss at L2-L3 with endplate osseous spurring and likely mild spinal canal stenosis.  This report was finalized on 7/26/2022 10:55 PM by Cody Diaz MD.      XR Chest 1 View    Result Date: 7/26/2022  1.  Cardiomegaly with small right-sided pleural effusion. 2.  No evidence of rib fracture or pneumothorax.  This report was finalized on 7/26/2022 10:56 PM by Cody Diaz MD.      MRI Knee Right Without Contrast    Result Date: 8/1/2022   No acute osseous findings.  There is a moderate knee joint effusion which is nonspecific. No obvious destructive joint space loss, discrete bony erosion, or subchondral marrow edema to suggest septic joint otherwise, however clinical correlation is required and consider joint fluid sampling as clinically indicated.  Diffuse circumferential soft tissue swelling/subcutaneous edema about the knee, including isointense prepatellar collection which may reflect posttraumatic versus infectious bursitis versus hematoma.  Moderate to severe tricompartmental osteoarthritic change. Complex degenerative tearing of the menisci.  Indistinctness of the anterior cruciate ligament, difficult to distinguish whether this reflects poor visualization from motion degradation artifact versus mucoid degeneration or chronic tear.  This report was finalized on 8/1/2022 8:31 AM by Kings Baez MD.      CT Maxillofacial Without Contrast    Result Date: 7/26/2022  1. Motion limited examination. No evidence of acute displaced facial bone fracture. 2. Right-sided periorbital soft tissue swelling and right-sided cheek swelling. No evidence of retrobulbar hematoma or orbital injury. 3. Mild air-fluid level within the dependent portion of the right maxillary sinus.  This report was finalized on 7/26/2022 10:37 PM by Cody Diaz MD.      XR Hip With or Without Pelvis 2 - 3 View  Right    Result Date: 7/26/2022  1. No acute osseous abnormality of the pelvis or right hip. 2. Bilateral hip degenerative joint disease.  This report was finalized on 7/26/2022 10:59 PM by Cody Diaz MD.        Impression:   1. 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  The fact that she has 2 positive blood cultures for staph epidermis with MRSA also one of the sets suggest that the isolates all skin contaminants.  In addition, follow-up blood cultures have been negative.  She does not have any focal evidence of MRSA infection.  I will plan to discontinue her daptomycin today.  2. Right prepatellar hematoma/right knee pain-her routine x-ray was negative for fracture.   3. Recent onset of left pretibial ulcer  4. 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 On chronic Augmentin oral suppressive therapy for about a year now.  H/o MSSA.  5. Hypothermia, improved  6. Acute on chronic kidney disease Stage III, ATN vs rhabdomyolysis  7. Rhadomyolysis, after fall and lying on right side.  8. Right-sided edema/anasarca  9. Elevated LFTs/bilirubin related to above.   10. Chronic BLE edema/RLE lymphedema  11. Type 2 diabetes mellitus, poorly controlled  12. Essential hypertension  13. Nonischemic cardiomyopathy with EF 4-45%  14. Chronic diastolic/systolic mixed CHF  15. Acoustic neuroma  16. Cephelexin, erythromycin, and sulfa intolerances (GI intolerance).      PLAN/RECOMMENDATIONS:   1.  Discontinue daptomycin  2.  Augmentin 875 mg p.o. daily for chronic suppression of right foot infection    I discussed his very complex situation again with her and her daughter today.  I also discussed her complex situation with hospital service.           Stef Pete MD  8/1/2022  08:40 EDT

## 2022-08-01 NOTE — THERAPY TREATMENT NOTE
Patient Name: Susan Anderson  : 1940    MRN: [id number]                              Today's Date: 2022       Admit Date: 2022    Visit Dx:     ICD-10-CM ICD-9-CM   1. Weakness  R53.1 780.79   2. Fall, initial encounter  W19.XXXA E888.9   3. Elevated CK  R74.8 790.5   4. Elevated troponin  R77.8 790.6   5. Chronic renal failure, unspecified CKD stage  N18.9 585.9   6. Hyperglycemia  R73.9 790.29     Patient Active Problem List   Diagnosis   • Diabetic foot ulcer ([facility])   • PVD (peripheral vascular disease) ([facility])   • Osteomyelitis ([facility])   • Diabetes mellitus with neuropathy ([facility])   • Essential hypertension   • CKD (chronic kidney disease), stage III ([facility])   • Pyogenic inflammation of bone ([facility])   • Hyperglycemia   • Pneumonia due to infectious organism   • Mitral valve disease   • NICM (nonischemic cardiomyopathy) ([facility])   • Coronary artery disease involving native coronary artery of native heart without angina pectoris   • Dyslipidemia   • Chronic combined systolic and diastolic heart failure ([facility])   • Lumbar stenosis with neurogenic claudication   • Spondylosis of lumbar region without myelopathy or radiculopathy   • Spondylolisthesis, lumbar region   • Degeneration of lumbar or lumbosacral intervertebral disc   • Gait disturbance   • Physical deconditioning   • Sarcopenia   • Weakness   • Anemia   • Fall   • Dizziness   • Acoustic neuroma ([facility])   • Elevated troponin   • CHF exacerbation ([facility])   • Elevated serum creatinine     Past Medical History:   Diagnosis Date   • Arthritis    • Asthma  - double pneumonia    Currently on inhaler and nebulizer   • Cancer ([facility])     cervical cancer, skin cancer   • CHF (congestive heart failure) ([facility]) 2021   • Chronic kidney disease Related to diabetes   • Coronary artery disease 2021 DX for hear failure   • Diabetes mellitus ([facility]) 30 years    Seeing Dr. Lam 1st time Aug 19   • Gout    • Hx of colonoscopy    • Hyperlipidemia Reference current labs  x 2-3yrs approx   • Hypertension 30 years   • Migraine    • Mitral valve disease    • Mitral valve disease    • Osteomyelitis ([facility])    • Peripheral neuropathy    • Sleep apnea    • Type 2 diabetes mellitus ([facility])     30 years     Past Surgical History:   Procedure Laterality Date   • ABDOMINAL HYSTERECTOMY W/SALPINGECTOMY     • AMPUTATION  Right great toe, 1st 1/3 metatarsal -Reg 4/14/21   • AORTAGRAM N/A 4/9/2021    Procedure: AORTAGRAM WITH OR WITHOUT RUNOFFS, WITH Co2;  Surgeon: Trey Forrest MD;  Location: [facility];  Service: Vascular;  Laterality: N/A;   • APPENDECTOMY     • BRAIN TUMOR EXCISION      laser surgery    • CARDIAC CATHETERIZATION N/A 6/21/2021    Procedure: LEFT HEART CATH;  Surgeon: Anjum Ceballos MD;  Location: [facility] CATH [facility]VASIVE LOCATION;  Service: Cardiology;  Laterality: N/A;   • CATARACT EXTRACTION W/ INTRAOCULAR LENS  IMPLANT, BILATERAL     • CHOLECYSTECTOMY     • EYE SURGERY     • HYSTERECTOMY     • TOE SURGERY     • TRANS METATARSAL AMPUTATION Right 4/14/2021    Procedure: AMPUTATION TRANS METATARSAL RIGHT GREAT TOE;  Surgeon: Valdez Finney MD;  Location: [facility];  Service: Vascular;  Laterality: Right;      General Information     Row Name 08/01/22 1732          OT Time and Intention    Document Type therapy note (daily note)  -MR     Mode of Treatment occupational therapy  -MR     Row Name 08/01/22 1732          General Information    Patient Profile Reviewed yes  -MR     Existing Precautions/Restrictions fall;oxygen therapy device and L/min;other (see comments)  brief for mobility  -MR     Barriers to Rehab previous functional deficit;medically complex  -MR     Row Name 08/01/22 1732          Cognition    Orientation Status (Cognition) oriented x 3  -MR     Row Name 08/01/22 1732          Safety Issues, Functional Mobility    Safety Issues Affecting Function (Mobility) insight into deficits/self-awareness;judgment;safety precaution awareness;safety precautions  follow-through/compliance;sequencing abilities;problem-solving;steps too close to assistive device  -     Impairments Affecting Function (Mobility) balance;endurance/activity tolerance;pain;postural/trunk control;range of motion (ROM);strength  -           User Key  (r) = Recorded By, (t) = Taken By, (c) = Cosigned By    Initials Name Provider Type    Treasure Vega, OT Occupational Therapist               Lymphedema     Row Name 07/30/22 0912             Lymphedema Edema Assessment    Ptting Edema Category By severity  -      Pitting Edema Mild;Moderate  Mild edema distal to knee, moderate to severe proximal to knees.  -LH      Recorded by [] López Graham, PT              Skin Changes/Observations    Location/Assessment Lower Extremity  -      Lower Extremity Conditions bilateral:;dry;hairless;inflamed  -      Lower Extremity Color/Pigment bilateral:;hyperpigmented  -      Recorded by [] López Graham, PT              Compression/Skin Care    Compression/Skin Care skin care;wrapping location  -      Skin Care washed/dried;lotion applied  -      Wrapping Location lower extremity  -      Wrapping Location LE bilateral:;foot to knee  -      Wrapping Comments BLE size 4/5 compressogrips applied doubled and overlapping for gradient compression.  -      Recorded by [] López Graham, PT            User Key  (r) = Recorded By, (t) = Taken By, (c) = Cosigned By    Initials Name Effective Dates     López Graham, PT 09/21/21 -                Mobility/ADL's     Row Name 08/01/22 3957          Bed Mobility    Bed Mobility rolling left;rolling right  -MR     Rolling Left [place] (Bed Mobility) maximum assist (25% patient effort)  -MR     Rolling Right [place] (Bed Mobility) maximum assist (25% patient effort)  -MR     Assistive Device (Bed Mobility) bed rails;head of bed elevated;draw sheet  -     Comment, (Bed Mobility) MaxA for rolling this date d/t fatigue and pain in  the RLE  -MR     Row Name 08/01/22 1733          Transfers    Transfers bed-chair transfer  -MR     Bed-Chair [place] (Transfers) dependent (less than 25% patient effort);2 person assist;verbal cues;nonverbal cues (demo/gesture)  -MR     Assistive Device (Bed-Chair Transfers) lift device  -MR     Row Name 08/01/22 1733          Bed-Chair Transfer    Comment, (Bed-Chair Transfer) DEP w/ use of lift device for bed > chair transfer  -MR     Row Name 08/01/22 1733          Activities of Daily Living    BADL Assessment/Intervention feeding  -MR     Row Name 08/01/22 1733          Self-Feeding Assessment/Training    Fisher Level (Feeding) feeding skills;set up  -MR     Position (Self-Feeding) supported sitting  -MR           User Key  (r) = Recorded By, (t) = Taken By, (c) = Cosigned By    Initials Name Provider Type    Treasure Vega OT Occupational Therapist               Obj/Interventions     Row Name 08/01/22 1734          Balance    Balance Assessment sitting static balance;sitting dynamic balance  -MR     Static Sitting Balance contact guard  -MR     Dynamic Sitting Balance contact guard  -MR     Position, Sitting Balance supported;sitting in chair  -MR           User Key  (r) = Recorded By, (t) = Taken By, (c) = Cosigned By    Initials Name Provider Type    Treasure Vega, OT Occupational Therapist               Goals/Plan    No documentation.                Clinical Impression     Row Name 08/01/22 1735          Pain Assessment    Pretreatment Pain Rating 7/10  -MR     Posttreatment Pain Rating 7/10  -MR     Pain Location - Side/Orientation Right  -MR     Pain Location lower  -MR     Pain Location - extremity  -MR     Pain Intervention(s) Ambulation/increased activity;Repositioned  -MR     Row Name 08/01/22 1735          Plan of Care Review    Plan of Care Reviewed With patient  -MR     Progress no change  -MR     Outcome Evaluation Pt limited by pain and stiffness this date. MaxA for rolling to  the L and R to place lift device. DEP x 2 w/ lift device for bed > chair. Further transfer training deferred d/t fatigue and RLE pain. Continue to progress as able per current POC.  -     Row Name 08/01/22 1735          Therapy Plan Review/Discharge Plan (OT)    Anticipated Discharge Disposition (OT) inpatient rehabilitation facility  -     Row Name 08/01/22 1735          Vital Signs    O2 Delivery Pre Treatment nasal cannula  -MR     O2 Delivery Intra Treatment nasal cannula  -MR     O2 Delivery Post Treatment nasal cannula  -MR     Pre Patient Position Supine  -MR     Intra Patient Position Side Lying  -MR     Post Patient Position Sitting  -MR     Row Name 08/01/22 1735          Positioning and Restraints    Pre-Treatment Position in bed  -MR     Post Treatment Position chair  -MR     In Chair notified nsg;reclined;sitting;call light within reach;encouraged to call for assist;exit alarm on;waffle cushion;on mechanical lift sling;legs elevated  -MR           User Key  (r) = Recorded By, (t) = Taken By, (c) = Cosigned By    Initials Name Provider Type    Treasure Vega, MUSTAPHA Occupational Therapist               Outcome Measures     Row Name 08/01/22 1742          How much help from another is currently needed...    Putting on and taking off regular lower body clothing? 1  -MR     Bathing (including washing, rinsing, and drying) 2  -MR     Toileting (which includes using toilet bed pan or urinal) 1  -MR     Putting on and taking off regular upper body clothing 2  -MR     Taking care of personal grooming (such as brushing teeth) 3  -MR     Eating meals 4  -MR     AM-PAC 6 Clicks Score (OT) 13  -MR     Row Name 08/01/22 1742          Functional Assessment    Outcome Measure Options AM-PAC 6 Clicks Daily Activity (OT)  -MR           User Key  (r) = Recorded By, (t) = Taken By, (c) = Cosigned By    Initials Name Provider Type    Treasure Vega, MUSTAPHA Occupational Therapist                Occupational Therapy  Education                 Title: PT OT SLP Therapies (Done)     Topic: Occupational Therapy (Done)     Point: ADL training (Done)     Description:   Instruct learner(s) on proper safety adaptation and remediation techniques during self care or transfers.   Instruct in proper use of assistive devices.              Learning Progress Summary           Patient Acceptance, E, VU by MR at 8/1/2022 1742    Acceptance, E, VU,NR by MR at 7/29/2022 1455    Acceptance, E, NR by  at 7/27/2022 1352                   Point: Home exercise program (Done)     Description:   Instruct learner(s) on appropriate technique for monitoring, assisting and/or progressing therapeutic exercises/activities.              Learning Progress Summary           Patient Acceptance, E, VU by MR at 8/1/2022 1742                   Point: Precautions (Done)     Description:   Instruct learner(s) on prescribed precautions during self-care and functional transfers.              Learning Progress Summary           Patient Acceptance, E, VU by MR at 8/1/2022 1742    Acceptance, E, VU,NR by  at 7/29/2022 1455    Acceptance, E, NR by  at 7/27/2022 1352                   Point: Body mechanics (Done)     Description:   Instruct learner(s) on proper positioning and spine alignment during self-care, functional mobility activities and/or exercises.              Learning Progress Summary           Patient Acceptance, E, VU by MR at 8/1/2022 1742    Acceptance, E, VU,NR by  at 7/29/2022 1455    Acceptance, E, NR by  at 7/27/2022 1352                               User Key     Initials Effective Dates Name Provider Type Discipline     06/16/21 -  Nishi Diego, OT Occupational Therapist OT     10/06/21 -  Treasure Mobley OT Occupational Therapist OT              OT Recommendation and Plan     Plan of Care Review  Plan of Care Reviewed With: patient  Progress: no change  Outcome Evaluation: Pt limited by pain and stiffness this date. MaxA for rolling to  the L and R to place lift device. DEP x 2 w/ lift device for bed > chair. Further transfer training deferred d/t fatigue and RLE pain. Continue to progress as able per current POC.     Time Calculation:    Time Calculation- OT     Row Name 08/01/22 1742             Time Calculation- OT    OT Start Time 1531  -MR      OT Received On 08/01/22  -MR      OT Goal Re-Cert Due Date 08/06/22  -MR              Timed Charges    05435 - OT Therapeutic Activity Minutes 14  -MR      12078 - OT Self Care/Mgmt Minutes 5  -MR              Total Minutes    Timed Charges Total Minutes 19  -MR       Total Minutes 19  -MR            User Key  (r) = Recorded By, (t) = Taken By, (c) = Cosigned By    Initials Name Provider Type    Wilber Vega OT Occupational Therapist              Therapy Charges for Today     Code Description Service Date Service Provider Modifiers Qty    [id number]  OT THERAPEUTIC ACT EA 15 MIN 8/1/2022 Wilber Mobley OT GO 1               WILBER MOBLEY OT  8/1/2022

## 2022-08-02 LAB
ANION GAP SERPL CALCULATED.3IONS-SCNC: 8 MMOL/L (ref 5–15)
BUN SERPL-MCNC: 65 MG/DL (ref 8–23)
BUN/CREAT SERPL: 48.5 (ref 7–25)
CALCIUM SPEC-SCNC: 9.2 MG/DL (ref 8.6–10.5)
CHLORIDE SERPL-SCNC: 104 MMOL/L (ref 98–107)
CO2 SERPL-SCNC: 31 MMOL/L (ref 22–29)
CREAT SERPL-MCNC: 1.34 MG/DL (ref 0.57–1)
DEPRECATED RDW RBC AUTO: 55 FL (ref 37–54)
EGFRCR SERPLBLD CKD-EPI 2021: 39.4 ML/MIN/1.73
ERYTHROCYTE [DISTWIDTH] IN BLOOD BY AUTOMATED COUNT: 16.7 % (ref 12.3–15.4)
GLUCOSE BLDC GLUCOMTR-MCNC: 169 MG/DL (ref 70–130)
GLUCOSE BLDC GLUCOMTR-MCNC: 200 MG/DL (ref 70–130)
GLUCOSE BLDC GLUCOMTR-MCNC: 81 MG/DL (ref 70–130)
GLUCOSE BLDC GLUCOMTR-MCNC: 95 MG/DL (ref 70–130)
GLUCOSE SERPL-MCNC: 167 MG/DL (ref 65–99)
HCT VFR BLD AUTO: 30.7 % (ref 34–46.6)
HGB BLD-MCNC: 9.7 G/DL (ref 12–15.9)
MCH RBC QN AUTO: 28.8 PG (ref 26.6–33)
MCHC RBC AUTO-ENTMCNC: 31.6 G/DL (ref 31.5–35.7)
MCV RBC AUTO: 91.1 FL (ref 79–97)
PLATELET # BLD AUTO: 147 10*3/MM3 (ref 140–450)
PMV BLD AUTO: 13 FL (ref 6–12)
POTASSIUM SERPL-SCNC: 4.6 MMOL/L (ref 3.5–5.2)
RBC # BLD AUTO: 3.37 10*6/MM3 (ref 3.77–5.28)
SODIUM SERPL-SCNC: 143 MMOL/L (ref 136–145)
WBC NRBC COR # BLD: 4.72 10*3/MM3 (ref 3.4–10.8)

## 2022-08-02 PROCEDURE — 85027 COMPLETE CBC AUTOMATED: CPT | Performed by: INTERNAL MEDICINE

## 2022-08-02 PROCEDURE — 25010000002 ONDANSETRON PER 1 MG: Performed by: INTERNAL MEDICINE

## 2022-08-02 PROCEDURE — 97597 DBRDMT OPN WND 1ST 20 CM/<: CPT

## 2022-08-02 PROCEDURE — 63710000001 INSULIN DETEMIR PER 5 UNITS: Performed by: PHYSICIAN ASSISTANT

## 2022-08-02 PROCEDURE — 97530 THERAPEUTIC ACTIVITIES: CPT

## 2022-08-02 PROCEDURE — 80048 BASIC METABOLIC PNL TOTAL CA: CPT | Performed by: PHYSICIAN ASSISTANT

## 2022-08-02 PROCEDURE — 63710000001 INSULIN LISPRO (HUMAN) PER 5 UNITS: Performed by: PHYSICIAN ASSISTANT

## 2022-08-02 PROCEDURE — 99232 SBSQ HOSP IP/OBS MODERATE 35: CPT | Performed by: PHYSICIAN ASSISTANT

## 2022-08-02 PROCEDURE — P9047 ALBUMIN (HUMAN), 25%, 50ML: HCPCS | Performed by: INTERNAL MEDICINE

## 2022-08-02 PROCEDURE — 25010000002 HEPARIN (PORCINE) PER 1000 UNITS: Performed by: INTERNAL MEDICINE

## 2022-08-02 PROCEDURE — 97110 THERAPEUTIC EXERCISES: CPT

## 2022-08-02 PROCEDURE — 82962 GLUCOSE BLOOD TEST: CPT

## 2022-08-02 PROCEDURE — 25010000002 ALBUMIN HUMAN 25% PER 50 ML: Performed by: INTERNAL MEDICINE

## 2022-08-02 RX ORDER — INSULIN LISPRO 100 [IU]/ML
10 INJECTION, SOLUTION INTRAVENOUS; SUBCUTANEOUS
Status: DISCONTINUED | OUTPATIENT
Start: 2022-08-02 | End: 2022-08-03

## 2022-08-02 RX ORDER — BISACODYL 10 MG
10 SUPPOSITORY, RECTAL RECTAL DAILY PRN
Status: DISCONTINUED | OUTPATIENT
Start: 2022-08-02 | End: 2022-08-12 | Stop reason: HOSPADM

## 2022-08-02 RX ORDER — BISACODYL 5 MG/1
10 TABLET, DELAYED RELEASE ORAL DAILY PRN
Status: DISCONTINUED | OUTPATIENT
Start: 2022-08-02 | End: 2022-08-12 | Stop reason: HOSPADM

## 2022-08-02 RX ORDER — ALBUMIN (HUMAN) 12.5 G/50ML
12.5 SOLUTION INTRAVENOUS 2 TIMES DAILY
Status: COMPLETED | OUTPATIENT
Start: 2022-08-02 | End: 2022-08-02

## 2022-08-02 RX ADMIN — HYDRALAZINE HYDROCHLORIDE 25 MG: 25 TABLET, FILM COATED ORAL at 06:41

## 2022-08-02 RX ADMIN — CASTOR OIL AND BALSAM, PERU 1 APPLICATION: 788; 87 OINTMENT TOPICAL at 20:21

## 2022-08-02 RX ADMIN — AMLODIPINE BESYLATE 5 MG: 5 TABLET ORAL at 08:21

## 2022-08-02 RX ADMIN — GUAIFENESIN AND DEXTROMETHORPHAN HYDROBROMIDE 1 TABLET: 600; 30 TABLET, EXTENDED RELEASE ORAL at 08:21

## 2022-08-02 RX ADMIN — HYDRALAZINE HYDROCHLORIDE 25 MG: 25 TABLET, FILM COATED ORAL at 20:19

## 2022-08-02 RX ADMIN — ALLOPURINOL 300 MG: 300 TABLET ORAL at 08:21

## 2022-08-02 RX ADMIN — DICLOFENAC 2 G: 10 GEL TOPICAL at 18:04

## 2022-08-02 RX ADMIN — ASPIRIN 81 MG: 81 TABLET, COATED ORAL at 08:21

## 2022-08-02 RX ADMIN — ALBUMIN HUMAN 12.5 G: 0.25 SOLUTION INTRAVENOUS at 08:24

## 2022-08-02 RX ADMIN — SENNOSIDES AND DOCUSATE SODIUM 1 TABLET: 50; 8.6 TABLET ORAL at 20:20

## 2022-08-02 RX ADMIN — DICLOFENAC 2 G: 10 GEL TOPICAL at 13:00

## 2022-08-02 RX ADMIN — ONDANSETRON 4 MG: 2 INJECTION INTRAMUSCULAR; INTRAVENOUS at 01:06

## 2022-08-02 RX ADMIN — AMOXICILLIN AND CLAVULANATE POTASSIUM 1 TABLET: 875; 125 TABLET, FILM COATED ORAL at 08:21

## 2022-08-02 RX ADMIN — VITAM B12 100 MCG: 100 TAB at 08:21

## 2022-08-02 RX ADMIN — ALBUMIN HUMAN 12.5 G: 0.25 SOLUTION INTRAVENOUS at 12:59

## 2022-08-02 RX ADMIN — PREGABALIN 50 MG: 50 CAPSULE ORAL at 20:19

## 2022-08-02 RX ADMIN — BISACODYL 10 MG: 5 TABLET, COATED ORAL at 08:21

## 2022-08-02 RX ADMIN — INSULIN DETEMIR 20 UNITS: 100 INJECTION, SOLUTION SUBCUTANEOUS at 08:35

## 2022-08-02 RX ADMIN — SENNOSIDES AND DOCUSATE SODIUM 1 TABLET: 50; 8.6 TABLET ORAL at 08:21

## 2022-08-02 RX ADMIN — POLYETHYLENE GLYCOL 3350 17 G: 17 POWDER, FOR SOLUTION ORAL at 08:20

## 2022-08-02 RX ADMIN — PREGABALIN 50 MG: 50 CAPSULE ORAL at 08:21

## 2022-08-02 RX ADMIN — INSULIN LISPRO 3 UNITS: 100 INJECTION, SOLUTION INTRAVENOUS; SUBCUTANEOUS at 12:59

## 2022-08-02 RX ADMIN — HYDRALAZINE HYDROCHLORIDE 25 MG: 25 TABLET, FILM COATED ORAL at 16:12

## 2022-08-02 RX ADMIN — LIDOCAINE 1 PATCH: 50 PATCH CUTANEOUS at 08:23

## 2022-08-02 RX ADMIN — Medication 10 ML: at 20:20

## 2022-08-02 RX ADMIN — ALBUMIN HUMAN 12.5 G: 0.25 SOLUTION INTRAVENOUS at 20:20

## 2022-08-02 RX ADMIN — DICLOFENAC 2 G: 10 GEL TOPICAL at 20:21

## 2022-08-02 RX ADMIN — INSULIN LISPRO 8 UNITS: 100 INJECTION, SOLUTION INTRAVENOUS; SUBCUTANEOUS at 13:00

## 2022-08-02 RX ADMIN — HEPARIN SODIUM 5000 UNITS: 5000 INJECTION INTRAVENOUS; SUBCUTANEOUS at 08:22

## 2022-08-02 RX ADMIN — HEPARIN SODIUM 5000 UNITS: 5000 INJECTION INTRAVENOUS; SUBCUTANEOUS at 20:19

## 2022-08-02 RX ADMIN — ISOSORBIDE DINITRATE 20 MG: 20 TABLET ORAL at 20:21

## 2022-08-02 RX ADMIN — ATORVASTATIN CALCIUM 80 MG: 40 TABLET, FILM COATED ORAL at 20:20

## 2022-08-02 RX ADMIN — MAGNESIUM HYDROXIDE 10 ML: 2400 SUSPENSION ORAL at 08:20

## 2022-08-02 RX ADMIN — ISOSORBIDE DINITRATE 20 MG: 20 TABLET ORAL at 08:21

## 2022-08-02 RX ADMIN — CASTOR OIL AND BALSAM, PERU 1 APPLICATION: 788; 87 OINTMENT TOPICAL at 08:26

## 2022-08-02 RX ADMIN — INSULIN LISPRO 8 UNITS: 100 INJECTION, SOLUTION INTRAVENOUS; SUBCUTANEOUS at 08:34

## 2022-08-02 RX ADMIN — BUMETANIDE 1 MG: 1 TABLET ORAL at 08:23

## 2022-08-02 RX ADMIN — DICLOFENAC 2 G: 10 GEL TOPICAL at 08:35

## 2022-08-02 RX ADMIN — INSULIN LISPRO 2 UNITS: 100 INJECTION, SOLUTION INTRAVENOUS; SUBCUTANEOUS at 08:35

## 2022-08-02 RX ADMIN — Medication 10 ML: at 08:36

## 2022-08-02 NOTE — PROGRESS NOTES
[facility] Medicine Services  PROGRESS NOTE    Patient Name: Susan Anderson  : 1940  MRN: [id number]    Date of Admission: 2022  Primary Care Physician: Arleen Doherty MD    Subjective     CC: f/u weakness, constipation    HPI:  Patient reclining in chair this afternoon. Reports poor sleep overnight r/t disimpaction x2 and nausea. Patient has been constipated x8 days with first BM today, but feels great relief after her BM. She is drowsy and drifting to sleep between conversation. Denies abdominal pain, nausea, or vomiting at this time.   Daughter at bedside. Answered her questions.     ROS:  Gen- No fevers, chills  CV- No chest pain, palpitations  Resp- No cough, dyspnea  GI- No N/V/D, abd taut and tense, (+) constipation    Objective     Vital Signs:   Temp:  [97.7 °F (36.5 °C)-98.7 °F (37.1 °C)] 97.8 °F (36.6 °C)  Heart Rate:  [70-86] 70  Resp:  [18] 18  BP: (135-166)/(62-78) 148/71  Flow (L/min):  [1.5-2] 2     Physical Exam:  Constitutional: Chronically ill appearing, no acute distress, drifting to sleep between care   HENT: NCAT, mucous membranes moist, congested  Respiratory: Clear to auscultation bilaterally, respiratory effort normal  Cardiovascular: RRR, no murmurs, rubs, or gallops  Gastrointestinal: Positive bowel sounds, firm, non-tender to palpation but discomfort noted  Musculoskeletal: 1-2+ pitting BLE edema, BUE 1+ edema  Psychiatric: Appropriate affect, cooperative  Neurologic: Numbness BLE; Oriented x 3, moves all extremities spontaneously without focal deficits, speech clear    Results Reviewed:  LAB RESULTS:      Lab 22  0701 22  0708 22  0525 22  0743 22  1456 22  0427 22  2300   WBC 4.72 5.77 5.32 5.34 5.82 5.68 6.29   HEMOGLOBIN 9.7* 9.5* 9.1* 8.8* 9.6* 10.3* 10.1*   HEMATOCRIT 30.7* 29.5* 27.1* 27.5* 29.2* 30.1* 30.4*   PLATELETS 147 188 184 160 186 175 216   NEUTROS ABS  --   --   --   --  4.06 4.71 5.22    IMMATURE GRANS (ABS)  --   --   --   --  0.19*  --   --    LYMPHS ABS  --   --   --   --  0.86  --   --    MONOS ABS  --   --   --   --  0.61  --   --    EOS ABS  --   --   --   --  0.08 0.00 0.00   MCV 91.1 91.6 90.3 90.5 90.4 87.8 89.1   PROCALCITONIN  --   --   --   --   --   --  0.09   LACTATE  --   --   --   --   --   --  1.1         Lab 08/02/22  0701 08/01/22  0708 07/31/22  1647 07/31/22  0525 07/30/22  0743 07/29/22  1456 07/28/22  0508 07/27/22  0427 07/26/22  2300   SODIUM 143 142  --  134* 134* 133*   < > 132* 137   POTASSIUM 4.6 4.2  --  4.2 5.0 4.7   < > 4.2 4.3   CHLORIDE 104 103  --  98 97* 96*   < > 95* 98   CO2 31.0* 32.0*  --  31.0* 25.0 30.0*   < > 27.0 27.0   ANION GAP 8.0 7.0  --  5.0 12.0 7.0   < > 10.0 12.0   BUN 65* 71*  --  79* 90* 91*   < > 92* 93*   CREATININE 1.34* 1.43*  --  1.62* 1.93* 1.73*   < > 1.71* 1.80*   EGFR 39.4* 36.5*  --  31.4* 25.4* 29.0*   < > 29.4* 27.7*   GLUCOSE 167* 90  --  87 249* 213*   < > 300* 323*   CALCIUM 9.2 9.0  --  8.3* 8.1* 8.5*   < > 8.8 8.9   MAGNESIUM  --   --   --   --   --   --   --  2.1  --    PHOSPHORUS  --   --  4.2  --   --   --   --  6.1*  --    HEMOGLOBIN A1C  --   --   --   --   --   --   --   --  9.00*   TSH  --   --   --   --   --   --   --  5.590* 3.430    < > = values in this interval not displayed.         Lab 07/31/22  0525 07/27/22 0427 07/26/22 2300   TOTAL PROTEIN 5.1* 6.3 6.1   ALBUMIN 2.80* 3.20* 3.10*   GLOBULIN 2.3 3.1 3.0   ALT (SGPT) 24 36* 37*   AST (SGOT) 29 42* 39*   BILIRUBIN 0.7 1.5* 1.5*  1.5*   INDIRECT BILIRUBIN  --   --  1.2   BILIRUBIN DIRECT  --   --  0.3   ALK PHOS 197* 234* 240*   LIPASE  --   --  30         Lab 07/27/22  0958 07/27/22 0427 07/26/22 2300   PROBNP  --   --  1,304.0   TROPONIN T 0.330* 0.374* 0.438*         Lab 07/27/22  0427 07/26/22 2300   IRON  --  48   IRON SATURATION  --  12*   TIBC  --  393   TRANSFERRIN  --  264   FERRITIN  --  40.61   FOLATE 13.80  --    VITAMIN B 12 >2,000*  --       Brief Urine Lab Results  (Last result in the past 365 days)      Color   Clarity   Blood   Leuk Est   Nitrite   Protein   CREAT   Urine HCG        07/31/22 1503             27.0             Microbiology Results Abnormal     Procedure Component Value - Date/Time    Body Fluid Culture - Body Fluid, Knee, Right [[id number]] Collected: 07/29/22 1400    Lab Status: Preliminary result Specimen: Body Fluid from Knee, Right Updated: 08/02/22 0804     Body Fluid Culture No growth at 4 days     Gram Stain Rare (1+) WBCs seen      No organisms seen    Blood Culture - Blood, Arm, Right [[id number]]  (Normal) Collected: 07/29/22 1456    Lab Status: Preliminary result Specimen: Blood from Arm, Right Updated: 08/01/22 1516     Blood Culture No growth at 3 days    Blood Culture - Blood, Hand, Left [[id number]]  (Normal) Collected: 07/29/22 1456    Lab Status: Preliminary result Specimen: Blood from Hand, Left Updated: 08/01/22 1516     Blood Culture No growth at 3 days    Eosinophil Smear - Urine, Urine, Clean Catch [[id number]]  (Normal) Collected: 07/31/22 1503    Lab Status: Final result Specimen: Urine, Clean Catch Updated: 07/31/22 1627     Eosinophil Smear 0 % EOS/100 Cells     Narrative:      No eosinophil seen    COVID PRE-OP / PRE-PROCEDURE SCREENING ORDER (NO ISOLATION) - Swab, Nasopharynx [[id number]]  (Normal) Collected: 07/26/22 2300    Lab Status: Final result Specimen: Swab from Nasopharynx Updated: 07/26/22 2340    Narrative:      The following orders were created for panel order COVID PRE-OP / PRE-PROCEDURE SCREENING ORDER (NO ISOLATION) - Swab, Nasopharynx.  Procedure                               Abnormality         Status                     ---------                               -----------         ------                     COVID-19 and FLU A/B PCR...[[id number]]  Normal              Final result                 Please view results for these tests on the individual orders.    COVID-19 and FLU A/B PCR - Swab,  Nasopharynx [[id number]]  (Normal) Collected: 07/26/22 2300    Lab Status: Final result Specimen: Swab from Nasopharynx Updated: 07/26/22 2340     COVID19 Not Detected     Influenza A PCR Not Detected     Influenza B PCR Not Detected    Narrative:      Fact sheet for providers: https://www.fda.gov/media/201363/download    Fact sheet for patients: https://www.fda.gov/media/053208/download    Test performed by PCR.        MRI Knee Right Without Contrast    Result Date: 8/1/2022  EXAMINATION: MRI KNEE RIGHT  WO CONTRAST-  INDICATION: Knee replacement, infection suspected  TECHNIQUE: MRI of the right knee was obtained using standard imaging sequences in three orthogonal imaging planes. No intravenous or intra-articular contrast was administered.  COMPARISON: Radiographs 7/26/2022  FINDINGS:  The exam is limited owing to motion degradation of multiple sequences.  Bone marrow signal intensity appears within normal limits. No evidence of stress or traumatic fracture. No traumatic malalignment. No evidence of bony contusion. No evidence of discrete bony erosion or subchondral marrow edema.  There is a moderate knee joint effusion. As seen on prior radiographs there is a 11 mm ossific joint body in the posterior joint (series 10 image 20). No popliteal cyst. Unremarkable appearance of the posterior knee neurovasculature. There is diffuse circumferential soft tissue swelling and subcutaneous edema about the knee. There is a 1.2 x 3.9 x 5.6 cm prepatellar collection appearing intermediate in signal (series 11 image 20).  Assessment of the menisci is limited owing to motion degradation. Allowing for this there is evidence of abnormal increased signal and indistinctness of the posterior horn of the medial meniscus (series 11 image 24 and series 8 image 21) compatible with complex degenerative tearing and/or partial radial tear. There is indistinctness, irregularity, and peripheral extrusion of the posterior body and posterior  horn of the lateral meniscus compatible with degenerative tearing (series 11 image 16 and series 8 image 19).  The posterior cruciate ligament and anterior cruciate ligament are poorly visualized. The posterior cruciate ligament appears grossly intact. The anterior cruciate ligament is very indistinct, difficult to distinguish whether this reflects motion degradation artifact versus mucoid degeneration or chronic tear. There is no conspicuous anterior translation of the tibia relative to the distal femur. There are no kissing contusions or other associated findings to suggest recent/acute ACL injury.  The quadriceps tendon and patellar tendon are normal. No conspicuous anterior knee fat pad edema.  The regions of high-grade full-thickness chondral loss within the lateral compartment and regions of moderate to high-grade chondral loss within the medial compartment. Medial and lateral compartment marginal osteophytes are noted. No conspicuous subchondral cystic change or subchondral marrow edema. There are regions of high-grade full-thickness chondral loss within the patellofemoral compartment. Patellofemoral osteophytes are noted.       Impression:  No acute osseous findings.  There is a moderate knee joint effusion which is nonspecific. No obvious destructive joint space loss, discrete bony erosion, or subchondral marrow edema to suggest septic joint otherwise, however clinical correlation is required and consider joint fluid sampling as clinically indicated.  Diffuse circumferential soft tissue swelling/subcutaneous edema about the knee, including isointense prepatellar collection which may reflect posttraumatic versus infectious bursitis versus hematoma.  Moderate to severe tricompartmental osteoarthritic change. Complex degenerative tearing of the menisci.  Indistinctness of the anterior cruciate ligament, difficult to distinguish whether this reflects poor visualization from motion degradation artifact versus  mucoid degeneration or chronic tear.  This report was finalized on 8/1/2022 8:31 AM by Kings Baez MD.      Duplex Renal Artery - Bilateral Complete CAR    Result Date: 8/1/2022  DATE OF EXAM: 8/1/2022 2:11 PM  PROCEDURE: DUPLEX RENAL ARTERY BILATERAL COMPLETE CAR-  INDICATIONS: other  COMPARISON: No comparisons available.  TECHNIQUE: Grayscale, color-flow, and Doppler spectral waveform analysis was performed of the kidneys, renal vasculature and aorta.  FINDINGS: The study was limited to due to patient body habitus and limited cooperation the right kidney has a maximal ixqv-tl-qtwq length of 11.2 cm. The left kidney has a maximal yqtt-ts-sjvr length of 12.2 cm. The echo pattern of both kidneys is normal. There are no masses and there is no hydronephrosis. The renal veins are patent. Resistive indices on the right are 0.7 on the left 0.8 maximal renal flow velocities on the right five and on the left is 13.1 cm/s. The peak systolic velocity in the aorta is 107 cm/s. The right renal aortic ratio is 0.5 and the left 0.6.       Impression:  1. Limited study due to the patient's body habitus and lack of cooperation. 2. Elevated resistive indices which may reflect underlying intrinsic renal disease. 3. No evidence of obstructive uropathy or significant renal artery stenosis.  This report was finalized on 8/1/2022 4:20 PM by Trey Saba MD.      Results for orders placed during the hospital encounter of 07/26/22    Adult Transthoracic Echo Complete W/ Cont if Necessary Per Protocol    Interpretation Summary  · Normal left ventricular systolic function, estimated EF 55%.  · Aortic sclerosis without aortic stenosis or regurgitation.  · Mild mitral regurgitation.    I have reviewed the medications:  Scheduled Meds:albumin human, 12.5 g, Intravenous, BID  allopurinol, 300 mg, Oral, Daily  amLODIPine, 5 mg, Oral, Daily  amoxicillin-clavulanate, 1 tablet, Oral, Daily  aspirin, 81 mg, Oral, Daily  atorvastatin, 80 mg, Oral,  Nightly  bumetanide, 1 mg, Oral, Daily  castor oil-balsam peru, 1 application, Topical, Q12H  Diclofenac Sodium, 2 g, Topical, 4x Daily  heparin (porcine), 5,000 Units, Subcutaneous, Q12H  hydrALAZINE, 25 mg, Oral, Q8H  insulin detemir, 20 Units, Subcutaneous, Daily  insulin lispro, 0-7 Units, Subcutaneous, TID AC  Insulin Lispro, 8 Units, Subcutaneous, TID With Meals  isosorbide dinitrate, 20 mg, Oral, BID  lidocaine, 1 patch, Transdermal, Q24H  polyethylene glycol, 17 g, Oral, Daily  pregabalin, 50 mg, Oral, Q12H  senna-docusate sodium, 1 tablet, Oral, BID  sodium chloride, 10 mL, Intravenous, Q12H  vitamin B-12, 100 mcg, Oral, Daily      Continuous Infusions:   PRN Meds:.•  acetaminophen **OR** acetaminophen **OR** acetaminophen  •  bisacodyl  •  bisacodyl  •  dextrose  •  dextrose  •  glucagon (human recombinant)  •  guaifenesin-dextromethorphan  •  magnesium hydroxide  •  methocarbamol  •  ondansetron **OR** ondansetron  •  oxyCODONE  •  [COMPLETED] Insert peripheral IV **AND** sodium chloride  •  sodium chloride    Assessment & Plan     Active Hospital Problems    Diagnosis  POA   • **Weakness [R53.1]  Yes   • Anemia [D64.9]  Unknown   • Fall [W19.XXXA]  Unknown   • Dizziness [R42]  Unknown   • Acoustic neuroma ([facility]) [D33.3]  Unknown   • Elevated troponin [R77.8]  Unknown   • CHF exacerbation ([facility]) [I50.9]  Unknown   • Elevated serum creatinine [R79.89]  Unknown   • NICM (nonischemic cardiomyopathy) ([facility]) [I42.8]  Yes   • Coronary artery disease involving native coronary artery of native heart without angina pectoris [I25.10]  Yes   • Dyslipidemia [E78.5]  Yes   • Chronic combined systolic and diastolic heart failure ([facility]) [I50.42]  Yes   • Mitral valve disease [I05.9]  Yes   • PVD (peripheral vascular disease) ([facility]) [I73.9]  Yes   • Essential hypertension [I10]  Yes   • CKD (chronic kidney disease), stage III ([facility]) [N18.30]  Yes      Resolved Hospital Problems   No resolved problems to display.     Brief  Hospital Course to date:  Susan Anderson is a 82 y.o. female with PMH significant for HTN, HLD, CAD, non-ischemic cardiomyopathy, chronic combined systolic/diastolic CHF, CKD III, PVD, insulin-dependent DMII, ELIUD (on 2L NC QHS, chronic BLE edema, and acoustic neuroma. She was admitted to [facility] 7/26/22 for mild rhabdomyolysis after a fall at home. Also found to have a/c CHF exacerbation and LACEY.     Fall at home  Generalized weakness  - Fall at home in bathroom when she opted not to wait for her bath aide to help her  - Cardiology has evaluated and feel her falls are likely mechanical and not cardiac. May have some orthostasis. Patient does report she feels better with SBP in 150's, so allowing some permissive hypertension   - PT/OT following - referral to [facility] is pending     Rhabdomyolysis, resp;rudy   -  on admission, s/p IV fluids     Acute on chronic combined systolic/diastolic CHF  Nonischemic cardiomyopathy   LACEY on CKD III  - Appreciate nephrology assistance  - 8/01 renal artery duplex suggestive of intrinsic disease but no significant renal artery stenosis or obstructive uropathy   - Baseline creatinine 1.3-1.5  - Creatinine 1.9 on admission, has since returned to baseline. 1.34 today   - IV albumin with diuresis   - Fluid restriction per nephrology     Blood culture (+) MRSA, suspected contaminant   - 7/26 - 2 of 2 blood cultures (+) staph epidermis, 1 of 2 positive for MRSA  - Repeat blood cultures on 7/31 NGTD  - Appreciate ID assistance. No source of infection has been identified.  - Discussed with ID on 8/1, suspect contaminant. Antibiotics stopped    Right knee effusion   - Orthopedic surgery has evaluated  - Joint aspiration not consistent with infection. Culture NGTD  - MRI of R knee does not suggest structural injury to the knee  - WBAT. Knee immobilizer on for comfort  - Follow up with Dr. Pearce in 2-3 weeks     Low back spasms   - PRN Robaxin   - Did not tolerate  Oxycodone     Chronic anemia   - PO iron supplement     Insulin-dependent DMII   Diabetic peripheral neuropathy  - Hgb A1c 9.0%  - Increase Levemir to 22 units daily, increase Lispro to 10 units TID AC + SSI  - Continue Lyrica     History of R great toe osteomyelitis  - s/p TMA 4/2021 by Dr. Finney.   - Culture (+) MSSA  - Has been on chronic suppressive Augmentin for ~1 year  - Back on Augmentin per ID      Dizziness  Acoustic neuroma, chronic  - ?contributing to falls  - Had initial w/u [facility]  - Has not had recent follow up imaging, consider MRI with/without contrast if GFR continues to improve prior to DC     Essential hypertension  - Beta blocker stopped due to bradycardia  - Restarted Lipitor now that Daptomycin has been stopped  - Continue Amlodipine 5mg daily, Bumex 1mg daily, Hydralazine 25mg TID, Isordil 20mg TID,      Constipation  - Continue Miralax / Senna  - May need to escalate further but will give her another 24-36 hours    Expected Discharge Location and Transportation: rehab via EMS or medical transport van   Expected Discharge Date: 8/4/22 or after the weekend if insurance approval still pending     DVT prophylaxis:Medical DVT prophylaxis orders are present.     AM-PAC 6 Clicks Score (PT): 13 (08/01/22 0805)    CODE STATUS:   Code Status and Medical Interventions:   Ordered at: 07/27/22 0143     Code Status (Patient has no pulse and is not breathing):    CPR (Attempt to Resuscitate)     Medical Interventions (Patient has pulse or is breathing):    Full Support     Anna Crystal PA-C  08/02/22

## 2022-08-02 NOTE — PLAN OF CARE
Goal Outcome Evaluation:  Plan of Care Reviewed With: patient           Outcome Evaluation: PT held LE compression today due to pt having increased c/o pain / discomfort in BLEs due to edema and pitting edema in thighs.  PTwill recheck in 2-3 days for possible restart of edema.

## 2022-08-02 NOTE — PLAN OF CARE
Goal Outcome Evaluation:  Plan of Care Reviewed With: patient        Progress: no change     VSS, 2L NC, bowel meds given, disimpacted x2, up with 2 to BSC, voiding well, purwick removed to encourage mobility, slept little throughout shift, PRN meds given for muscle spasms.

## 2022-08-02 NOTE — THERAPY TREATMENT NOTE
Patient Name: Susan Anderson  : 1940    MRN: [id number]                              Today's Date: 2022       Admit Date: 2022    Visit Dx:     ICD-10-CM ICD-9-CM   1. Weakness  R53.1 780.79   2. Fall, initial encounter  W19.XXXA E888.9   3. Elevated CK  R74.8 790.5   4. Elevated troponin  R77.8 790.6   5. Chronic renal failure, unspecified CKD stage  N18.9 585.9   6. Hyperglycemia  R73.9 790.29     Patient Active Problem List   Diagnosis   • Diabetic foot ulcer ([facility])   • PVD (peripheral vascular disease) ([facility])   • Osteomyelitis ([facility])   • Diabetes mellitus with neuropathy ([facility])   • Essential hypertension   • CKD (chronic kidney disease), stage III ([facility])   • Pyogenic inflammation of bone ([facility])   • Hyperglycemia   • Pneumonia due to infectious organism   • Mitral valve disease   • NICM (nonischemic cardiomyopathy) ([facility])   • Coronary artery disease involving native coronary artery of native heart without angina pectoris   • Dyslipidemia   • Chronic combined systolic and diastolic heart failure ([facility])   • Lumbar stenosis with neurogenic claudication   • Spondylosis of lumbar region without myelopathy or radiculopathy   • Spondylolisthesis, lumbar region   • Degeneration of lumbar or lumbosacral intervertebral disc   • Gait disturbance   • Physical deconditioning   • Sarcopenia   • Weakness   • Anemia   • Fall   • Dizziness   • Acoustic neuroma ([facility])   • Elevated troponin   • CHF exacerbation ([facility])   • Elevated serum creatinine     Past Medical History:   Diagnosis Date   • Arthritis    • Asthma  - double pneumonia    Currently on inhaler and nebulizer   • Cancer ([facility])     cervical cancer, skin cancer   • CHF (congestive heart failure) ([facility]) 2021   • Chronic kidney disease Related to diabetes   • Coronary artery disease 2021 DX for hear failure   • Diabetes mellitus ([facility]) 30 years    Seeing Dr. Lam 1st time Aug 19   • Gout    • Hx of colonoscopy    • Hyperlipidemia Reference current labs  x 2-3yrs approx   • Hypertension 30 years   • Migraine    • Mitral valve disease    • Mitral valve disease    • Osteomyelitis ([facility])    • Peripheral neuropathy    • Sleep apnea    • Type 2 diabetes mellitus (HCC)     30 years     Past Surgical History:   Procedure Laterality Date   • ABDOMINAL HYSTERECTOMY W/SALPINGECTOMY     • AMPUTATION  Right great toe, 1st 1/3 metatarsal -Reg 4/14/21   • AORTAGRAM N/A 4/9/2021    Procedure: AORTAGRAM WITH OR WITHOUT RUNOFFS, WITH Co2;  Surgeon: Trey Forrest MD;  Location: [facility];  Service: Vascular;  Laterality: N/A;   • APPENDECTOMY     • BRAIN TUMOR EXCISION      laser surgery    • CARDIAC CATHETERIZATION N/A 6/21/2021    Procedure: LEFT HEART CATH;  Surgeon: Anjum Ceballos MD;  Location: [facility] CATH INVASIVE LOCATION;  Service: Cardiology;  Laterality: N/A;   • CATARACT EXTRACTION W/ INTRAOCULAR LENS  IMPLANT, BILATERAL     • CHOLECYSTECTOMY     • EYE SURGERY     • HYSTERECTOMY     • TOE SURGERY     • TRANS METATARSAL AMPUTATION Right 4/14/2021    Procedure: AMPUTATION TRANS METATARSAL RIGHT GREAT TOE;  Surgeon: Valdez Finney MD;  Location: [facility] OR;  Service: Vascular;  Laterality: Right;      General Information     Row Name 08/02/22 1528          Physical Therapy Time and Intention    Document Type therapy note (daily note)  -SS     Mode of Treatment physical therapy  -SS     Row Name 08/02/22 1528          General Information    Patient Profile Reviewed yes  -SS     Existing Precautions/Restrictions fall;oxygen therapy device and L/min;other (see comments)  brief for mobility  -SS     Barriers to Rehab medically complex;previous functional deficit  -SS     Row Name 08/02/22 1528          Cognition    Orientation Status (Cognition) oriented x 3  -SS     Row Name 08/02/22 1528          Safety Issues, Functional Mobility    Safety Issues Affecting Function (Mobility) insight into deficits/self-awareness;judgment;positioning of assistive  device;problem-solving;safety precaution awareness;safety precautions follow-through/compliance;sequencing abilities  -     Impairments Affecting Function (Mobility) balance;endurance/activity tolerance;pain;postural/trunk control;range of motion (ROM);strength  -           User Key  (r) = Recorded By, (t) = Taken By, (c) = Cosigned By    Initials Name Provider Type     Sheron Ashton PT Physical Therapist               Mobility     Row Name 08/02/22 1529          Bed Mobility    Bed Mobility scooting/bridging;supine-sit  -     Scooting/Bridging [place] (Bed Mobility) minimum assist (75% patient effort);2 person assist;verbal cues;nonverbal cues (demo/gesture)  -     Supine-Sit [place] (Bed Mobility) 2 person assist;verbal cues;nonverbal cues (demo/gesture);minimum assist (75% patient effort)  -     Assistive Device (Bed Mobility) bed rails;head of bed elevated;draw sheet  -     Comment, (Bed Mobility) VC for sequencing  -     Row Name 08/02/22 1529          Bed-Chair Transfer    Bed-Chair [place] (Transfers) 2 person assist;verbal cues;nonverbal cues (demo/gesture);minimum assist (75% patient effort);moderate assist (50% patient effort)  -     Assistive Device (Bed-Chair Transfers) walker, front-wheeled  -     Comment, (Bed-Chair Transfer) VC for sequencing, LE advancement, hand placement; 1 trial to L side requiring mod assist, 1 trial to R side requiring min assist  -     Row Name 08/02/22 1529          Sit-Stand Transfer    Sit-Stand [place] (Transfers) minimum assist (75% patient effort);2 person assist;verbal cues;nonverbal cues (demo/gesture)  -     Assistive Device (Sit-Stand Transfers) walker, front-wheeled  -     Comment, (Sit-Stand Transfer) VC for sequencing, hand placement  -     Row Name 08/02/22 1529          Gait/Stairs (Locomotion)    Comment, (Gait/Stairs) power chair at baseline  -           User Key  (r) = Recorded By, (t) = Taken By, (c) =  Cosigned By    Initials Name Provider Type     Sheron Ashton PT Physical Therapist               Obj/Interventions     Row Name 08/02/22 1535          Motor Skills    Therapeutic Exercise hip;knee;ankle  -     Row Name 08/02/22 1535          Hip (Therapeutic Exercise)    Hip (Therapeutic Exercise) isometric exercises;strengthening exercise  -     Hip Isometrics (Therapeutic Exercise) bilateral;gluteal sets;10 repetitions;5 repetitions  -     Hip Strengthening (Therapeutic Exercise) bilateral;aBduction;aDduction;external rotation;internal rotation;heel slides;15 repititions  min assist  -     Row Name 08/02/22 1535          Knee (Therapeutic Exercise)    Knee (Therapeutic Exercise) isometric exercises;strengthening exercise  -     Knee Isometrics (Therapeutic Exercise) bilateral;quad sets;10 repetitions;5 repetitions  -     Knee Strengthening (Therapeutic Exercise) bilateral;SLR (straight leg raise);SAQ (short arc quad);15 repititions  min assist  -     Row Name 08/02/22 1535          Ankle (Therapeutic Exercise)    Ankle (Therapeutic Exercise) AROM (active range of motion)  -     Ankle AROM (Therapeutic Exercise) bilateral;dorsiflexion;plantarflexion;15 repititions  -     Row Name 08/02/22 1535          Balance    Balance Assessment sitting static balance;sitting dynamic balance;sit to stand dynamic balance;standing static balance;standing dynamic balance  -     Static Sitting Balance standby assist  -     Dynamic Sitting Balance standby assist  -     Position, Sitting Balance unsupported;sitting edge of bed  -     Sit to Stand Dynamic Balance minimal assist;2-person assist  -     Static Standing Balance contact guard  -     Dynamic Standing Balance minimal assist;moderate assist;2-person assist  -     Position/Device Used, Standing Balance supported;walker, front-wheeled  -     Balance Interventions sitting;standing;sit to stand;supported;static;dynamic  -           User Key   (r) = Recorded By, (t) = Taken By, (c) = Cosigned By    Initials Name Provider Type     Sheron Ashton, PT Physical Therapist               Goals/Plan    No documentation.                Clinical Impression     Row Name 08/02/22 1539          Pain    Pretreatment Pain Rating 1/10  -     Posttreatment Pain Rating 5/10  -     Pain Location - Side/Orientation Right  -     Pain Location lower  -     Pain Location - extremity;knee  -     Pain Intervention(s) Repositioned;Elevated  -     Additional Documentation Pain Scale: Numbers Pre/Post-Treatment (Group)  -     Row Name 08/02/22 1539          Plan of Care Review    Plan of Care Reviewed With patient;daughter  -     Progress improving  -     Outcome Evaluation Pt. performed bed mobility with min assist of 2. She performed sit to stand and bed to chair transfer w/min-mod assist of 2. She tolerated ther-ex well with min assist. Will continue to progress as able. Recommend SNF upon discharge.  -[facility] Name 08/02/22 1539          Therapy Assessment/Plan (PT)    Rehab Potential (PT) fair, will monitor progress closely  -     Criteria for Skilled Interventions Met (PT) yes;skilled treatment is necessary;meets criteria  -     Therapy Frequency (PT) daily  -     Row Name 08/02/22 1539          Vital Signs    Pre Systolic BP Rehab --  VSS  -     Pre SpO2 (%) 95  -SS     O2 Delivery Pre Treatment nasal cannula  2L  -SS     Intra SpO2 (%) 88  -SS     O2 Delivery Intra Treatment room air  -SS     Post SpO2 (%) 94  -SS     O2 Delivery Post Treatment nasal cannula  2L  -SS     Pre Patient Position Sitting  -SS     Intra Patient Position Standing  -SS     Post Patient Position Sitting  -     Row Name 08/02/22 1539          Positioning and Restraints    Pre-Treatment Position in bed  -SS     Post Treatment Position chair  -SS     In Chair notified nsg;reclined;call light within reach;encouraged to call for assist;exit alarm on;with  family/caregiver;with other staff;legs elevated;RUE elevated;LUE elevated  -           User Key  (r) = Recorded By, (t) = Taken By, (c) = Cosigned By    Initials Name Provider Type    Sheron Robles PT Physical Therapist               Outcome Measures     Row Name 08/02/22 1545          How much help from another person do you currently need...    Turning from your back to your side while in flat bed without using bedrails? 3  -SS     Moving from lying on back to sitting on the side of a flat bed without bedrails? 3  -SS     Moving to and from a bed to a chair (including a wheelchair)? 2  -SS     Standing up from a chair using your arms (e.g., wheelchair, bedside chair)? 3  -SS     Climbing 3-5 steps with a railing? 1  -SS     To walk in hospital room? 2  -SS     AM-PAC 6 Clicks Score (PT) 14  -SS     Highest level of mobility 4 --> Transferred to chair/commode  -     Row Name 08/02/22 1545          Functional Assessment    Outcome Measure Options AM-PAC 6 Clicks Basic Mobility (PT)  -           User Key  (r) = Recorded By, (t) = Taken By, (c) = Cosigned By    Initials Name Provider Type    Sheron Robles PT Physical Therapist                             Physical Therapy Education                 Title: PT OT SLP Therapies (Done)     Topic: Physical Therapy (Done)     Point: Mobility training (Done)     Learning Progress Summary           Patient Eager, E, VU,NR by  at 8/2/2022 1546    Comment: Reviewed safety/technique with bed mobility, transfers, HEP, importance of elevation of BUE and BLE, PT POC    Eager, E, VU,DU,NR by  at 7/29/2022 1645    Comment: Reviewed safety/technique with transfers, HEP, PT POC    Acceptance, E,TB, VU by  at 7/28/2022 0915    Comment: Patient educated about benefit of skilled PT services and POC with MLW    Eager, E, VU,NR by  at 7/27/2022 1201    Comment: Educated pt. safety/technique with bed mobility, transfers, PT POC   Family Eager, E, VU,NR by  at 8/2/2022  1546    Comment: Reviewed safety/technique with bed mobility, transfers, HEP, importance of elevation of BUE and BLE, PT POC                   Point: Home exercise program (Done)     Learning Progress Summary           Patient Eager, E, VU,NR by SS at 8/2/2022 1546    Comment: Reviewed safety/technique with bed mobility, transfers, HEP, importance of elevation of BUE and BLE, PT POC    Eager, E, VU,DU,NR by SS at 7/29/2022 1645    Comment: Reviewed safety/technique with transfers, HEP, PT POC    Acceptance, E,TB, VU by KW at 7/28/2022 0915    Comment: Patient educated about benefit of skilled PT services and POC with MLW   Family Eager, E, VU,NR by  at 8/2/2022 1546    Comment: Reviewed safety/technique with bed mobility, transfers, HEP, importance of elevation of BUE and BLE, PT POC                   Point: Body mechanics (Done)     Learning Progress Summary           Patient Eager, E, VU,NR by SS at 8/2/2022 1546    Comment: Reviewed safety/technique with bed mobility, transfers, HEP, importance of elevation of BUE and BLE, PT POC    Eager, E, VU,DU,NR by SS at 7/29/2022 1645    Comment: Reviewed safety/technique with transfers, HEP, PT POC    Acceptance, E,TB, VU by KW at 7/28/2022 0915    Comment: Patient educated about benefit of skilled PT services and POC with MLW    Eager, E, VU,NR by  at 7/27/2022 1201    Comment: Educated pt. safety/technique with bed mobility, transfers, PT POC   Family Eager, E, VU,NR by  at 8/2/2022 1546    Comment: Reviewed safety/technique with bed mobility, transfers, HEP, importance of elevation of BUE and BLE, PT POC                   Point: Precautions (Done)     Learning Progress Summary           Patient Eager, E, VU,NR by  at 8/2/2022 1546    Comment: Reviewed safety/technique with bed mobility, transfers, HEP, importance of elevation of BUE and BLE, PT POC    Eager, E, VU,DU,NR by SS at 7/29/2022 1645    Comment: Reviewed safety/technique with transfers, HEP, PT POC     Acceptance, E,TB, VU by  at 7/28/2022 0915    Comment: Patient educated about benefit of skilled PT services and POC with MLW    ELMO Drake, ALEE,NR by  at 7/27/2022 1201    Comment: Educated pt. safety/technique with bed mobility, transfers, PT POC   Family ELMO Drake, VU,NR by  at 8/2/2022 1546    Comment: Reviewed safety/technique with bed mobility, transfers, HEP, importance of elevation of BUE and BLE, PT POC                               User Key     Initials Effective Dates Name Provider Type Discipline     06/01/21 -  Sheron Ashton, PT Physical Therapist PT     01/27/22 -  Debra Henriquez, PT Physical Therapist PT              PT Recommendation and Plan  Planned Therapy Interventions (PT): balance training, bed mobility training, home exercise program, neuromuscular re-education, patient/family education, postural re-education, ROM (range of motion), strengthening, stretching, transfer training  Plan of Care Reviewed With: patient, daughter  Progress: improving  Outcome Evaluation: Pt. performed bed mobility with min assist of 2. She performed sit to stand and bed to chair transfer w/min-mod assist of 2. She tolerated ther-ex well with min assist. Will continue to progress as able. Recommend SNF upon discharge.     Time Calculation:    PT Charges     Row Name 08/02/22 1547 08/02/22 1330          Time Calculation    Start Time 1423  -SS 1330  -     Stop Time 1510  -SS --     Time Calculation (min) 47 min  -SS --     PT Received On 08/02/22  - --     PT Goal Re-Cert Due Date -- 08/06/22  -            Time Calculation- PT    Total Timed Code Minutes- PT 47 minute(s)  -SS --            Timed Charges    57920 - PT Therapeutic Exercise Minutes 17  -SS --     55838 - PT Therapeutic Activity Minutes 30  -SS --            Untimed Charges    Wound Care -- 62686 Selective debridement  -     [id number] debridement -- 15  -MF            Total Minutes    Timed Charges Total Minutes 47  -SS --      Untimed Charges Total Minutes -- 15  -MF      Total Minutes 47  -SS 15  -MF           User Key  (r) = Recorded By, (t) = Taken By, (c) = Cosigned By    Initials Name Provider Type    Avtar Small, PT Physical Therapist    SS Sheron Ashton PT Physical Therapist              Therapy Charges for Today     Code Description Service Date Service Provider Modifiers Qty    50888697796 HC PT THER PROC EA 15 MIN 8/2/2022 Sheron Ashton, PT GP 1    28913093648 HC PT THERAPEUTIC ACT EA 15 MIN 8/2/2022 Sheron Ashton, PT GP 2    69505239769 HC PT THER SUPP EA 15 MIN 8/2/2022 Sheron Ashton, PT GP 3          PT G-Codes  Outcome Measure Options: AM-PAC 6 Clicks Basic Mobility (PT)  AM-PAC 6 Clicks Score (PT): 14  AM-PAC 6 Clicks Score (OT): 13    Sheron Ashton PT  8/2/2022

## 2022-08-02 NOTE — PLAN OF CARE
Goal Outcome Evaluation:  Plan of Care Reviewed With: patient, daughter        Progress: improving  Outcome Evaluation: Pt. performed bed mobility with min assist of 2. She performed sit to stand and bed to chair transfer w/min-mod assist of 2. She tolerated ther-ex well with min assist. Will continue to progress as able. Recommend SNF upon discharge.

## 2022-08-02 NOTE — PROGRESS NOTES
"   LOS: 6 days    Patient Care Team:  Arleen Doherty MD as PCP - General (Internal Medicine)  Flory Sauer APRN (Nurse Practitioner)  Janeth Lam MD as Consulting Physician (Endocrinology)  Anjum Ceballos MD as Consulting Physician (Cardiology)    Chief Complaint:  SOA    Subjective     Interval History:     No acute events overnight. No new complaints     Review of Systems:   No CP or SOA , fever, chills, rigors, rash, N/V, Constipation.       Objective     Vital Sign Min/Max for last 24 hours  Temp  Min: 97.7 °F (36.5 °C)  Max: 98.7 °F (37.1 °C)   BP  Min: 135/78  Max: 166/70   Pulse  Min: 70  Max: 86   Resp  Min: 18  Max: 18   SpO2  Min: 90 %  Max: 95 %   Flow (L/min)  Min: 1.5  Max: 2   Weight  Min: 96.9 kg (213 lb 9.6 oz)  Max: 96.9 kg (213 lb 9.6 oz)     Flowsheet Rows    Flowsheet Row First Filed Value   Admission Height 160 cm (63\") Documented at 07/26/2022 2039   Admission Weight 90.7 kg (200 lb) Documented at 07/26/2022 2039          I/O this shift:  In: 50 [IV Piggyback:50]  Out: -   I/O last 3 completed shifts:  In: 767 [P.O.:767]  Out: 2350 [Urine:2350]    Physical Exam:    Gen: Alert, NAD   HENT: NC, AT, EOMI   NECK: Supple, no JVD, Trachea midline   LUNGS: CTA bilaterally, non labored respirtation   CVS: S1/S2 audible, RRR, no murmur   Abd: Soft, NT, ND, BS+   Ext: + pedal edema, no cyanosis   CNS: Alert, No focal deficit noted grossly  Psy: Cooperative  Skin: Warm, dry and intact      WBC WBC   Date Value Ref Range Status   08/02/2022 4.72 3.40 - 10.80 10*3/mm3 Final   08/01/2022 5.77 3.40 - 10.80 10*3/mm3 Final   07/31/2022 5.32 3.40 - 10.80 10*3/mm3 Final      HGB Hemoglobin   Date Value Ref Range Status   08/02/2022 9.7 (L) 12.0 - 15.9 g/dL Final   08/01/2022 9.5 (L) 12.0 - 15.9 g/dL Final   07/31/2022 9.1 (L) 12.0 - 15.9 g/dL Final      HCT Hematocrit   Date Value Ref Range Status   08/02/2022 30.7 (L) 34.0 - 46.6 % Final   08/01/2022 29.5 (L) 34.0 - 46.6 % Final   07/31/2022 " 27.1 (L) 34.0 - 46.6 % Final      Platlets No results found for: LABPLAT   MCV MCV   Date Value Ref Range Status   08/02/2022 91.1 79.0 - 97.0 fL Final   08/01/2022 91.6 79.0 - 97.0 fL Final   07/31/2022 90.3 79.0 - 97.0 fL Final          Sodium Sodium   Date Value Ref Range Status   08/02/2022 143 136 - 145 mmol/L Final   08/01/2022 142 136 - 145 mmol/L Final   07/31/2022 134 (L) 136 - 145 mmol/L Final      Potassium Potassium   Date Value Ref Range Status   08/02/2022 4.6 3.5 - 5.2 mmol/L Final     Comment:     Slight hemolysis detected by analyzer. Results may be affected.   08/01/2022 4.2 3.5 - 5.2 mmol/L Final   07/31/2022 4.2 3.5 - 5.2 mmol/L Final      Chloride Chloride   Date Value Ref Range Status   08/02/2022 104 98 - 107 mmol/L Final   08/01/2022 103 98 - 107 mmol/L Final   07/31/2022 98 98 - 107 mmol/L Final      CO2 CO2   Date Value Ref Range Status   08/02/2022 31.0 (H) 22.0 - 29.0 mmol/L Final   08/01/2022 32.0 (H) 22.0 - 29.0 mmol/L Final   07/31/2022 31.0 (H) 22.0 - 29.0 mmol/L Final      BUN BUN   Date Value Ref Range Status   08/02/2022 65 (H) 8 - 23 mg/dL Final   08/01/2022 71 (H) 8 - 23 mg/dL Final   07/31/2022 79 (H) 8 - 23 mg/dL Final      Creatinine Creatinine   Date Value Ref Range Status   08/02/2022 1.34 (H) 0.57 - 1.00 mg/dL Final   08/01/2022 1.43 (H) 0.57 - 1.00 mg/dL Final   07/31/2022 1.62 (H) 0.57 - 1.00 mg/dL Final      Calcium Calcium   Date Value Ref Range Status   08/02/2022 9.2 8.6 - 10.5 mg/dL Final   08/01/2022 9.0 8.6 - 10.5 mg/dL Final   07/31/2022 8.3 (L) 8.6 - 10.5 mg/dL Final      PO4 No results found for: CAPO4   Albumin Albumin   Date Value Ref Range Status   07/31/2022 2.80 (L) 3.50 - 5.20 g/dL Final      Magnesium No results found for: MG   Uric Acid Uric Acid   Date Value Ref Range Status   07/31/2022 4.2 2.4 - 5.7 mg/dL Final     Comment:     Falsely depressed results may occur on samples drawn from patients receiving N-Acetylcysteine (NAC) or Metamizole.            Results Review:     I reviewed the patient's new clinical results.    allopurinol, 300 mg, Oral, Daily  amLODIPine, 5 mg, Oral, Daily  amoxicillin-clavulanate, 1 tablet, Oral, Daily  aspirin, 81 mg, Oral, Daily  atorvastatin, 80 mg, Oral, Nightly  bumetanide, 1 mg, Oral, Daily  castor oil-balsam peru, 1 application, Topical, Q12H  Diclofenac Sodium, 2 g, Topical, 4x Daily  heparin (porcine), 5,000 Units, Subcutaneous, Q12H  hydrALAZINE, 25 mg, Oral, Q8H  insulin detemir, 20 Units, Subcutaneous, Daily  insulin lispro, 0-7 Units, Subcutaneous, TID AC  Insulin Lispro, 8 Units, Subcutaneous, TID With Meals  isosorbide dinitrate, 20 mg, Oral, BID  lidocaine, 1 patch, Transdermal, Q24H  polyethylene glycol, 17 g, Oral, Daily  pregabalin, 50 mg, Oral, Q12H  senna-docusate sodium, 1 tablet, Oral, BID  sodium chloride, 10 mL, Intravenous, Q12H  vitamin B-12, 100 mcg, Oral, Daily           Medication Review:     FINDINGS:   The study was limited to due to patient body habitus and limited  cooperation the right kidney has a maximal phgs-rn-fzbc length of 11.2  cm. The left kidney has a maximal rtcl-hc-gljb length of 12.2 cm. The  echo pattern of both kidneys is normal. There are no masses and there is  no hydronephrosis. The renal veins are patent. Resistive indices on the  right are 0.7 on the left 0.8 maximal renal flow velocities on the right  five and on the left is 13.1 cm/s. The peak systolic velocity in the  aorta is 107 cm/s. The right renal aortic ratio is 0.5 and the left 0.6.        IMPRESSION:     1. Limited study due to the patient's body habitus and lack of  cooperation.  2. Elevated resistive indices which may reflect underlying intrinsic  renal disease.  3. No evidence of obstructive uropathy or significant renal artery  stenosis.      Assessment & Plan       Weakness    PVD (peripheral vascular disease) ([facility])    Essential hypertension    CKD (chronic kidney disease), stage III ([facility])    Mitral valve  disease    NICM (nonischemic cardiomyopathy) (HCC)    Coronary artery disease involving native coronary artery of native heart without angina pectoris    Dyslipidemia    Chronic combined systolic and diastolic heart failure (HCC)    Anemia    Fall    Dizziness    Acoustic neuroma (HCC)    Elevated troponin    CHF exacerbation (HCC)    Elevated serum creatinine      1- LACEY - non oliguric - Scr 1.9 at presentation - Resolved. FeUrea- 31% suggestive of pre-renal etiology. Renal duplex no evidence of significant QUEENIE. Improving.   2- CKD stage III -baseline Scr 1.3-1.5 range. 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 Not a candidate for SGLT 2 inhibitor.   5- mild hyponatremia - resolved.   6- Anemia - tsat 12%   7- DM type II - not controlled A1c 9.0%- on insulin.   8- Volume overload - improving.   9- low albumin level      Plan    - Continue with diuretics with albumin infusion.   - Fluid restriction 1.2 lit/day   - Oral iron supplement   - Renal diet.   - Adjust meds per renal function   - No emergent need of RRT.        Jamie Gant MD  08/02/22  09:09 EDT

## 2022-08-02 NOTE — PLAN OF CARE
AXO x4. VSS.1.5 L O2. Voiding. Received albumin. Tolerating diet. Had c/o of pain on the right knee, scheduled meds given but refused any prns. Insulin coverage given. Dressing CDI. Had a bilateral renal artery duplex done today. Scheduled Bowel meds given, no result. Additional bowel meds would be a good idea. MD notified PRN. Will continue to monitor.

## 2022-08-02 NOTE — THERAPY WOUND CARE TREATMENT
Acute Care - Wound/Debridement Treatment Note  [facility]     Patient Name: Susan Anderson  : 1940  MRN: [id number]  Today's Date: 2022                Admit Date: 2022    Visit Dx:    ICD-10-CM ICD-9-CM   1. Weakness  R53.1 780.79   2. Fall, initial encounter  W19.XXXA E888.9   3. Elevated CK  R74.8 790.5   4. Elevated troponin  R77.8 790.6   5. Chronic renal failure, unspecified CKD stage  N18.9 585.9   6. Hyperglycemia  R73.9 790.29       Patient Active Problem List   Diagnosis   • Diabetic foot ulcer ([facility])   • PVD (peripheral vascular disease) ([facility])   • Osteomyelitis ([facility])   • Diabetes mellitus with neuropathy ([facility])   • Essential hypertension   • CKD (chronic kidney disease), stage III ([facility])   • Pyogenic inflammation of bone ([facility])   • Hyperglycemia   • Pneumonia due to infectious organism   • Mitral valve disease   • NICM (nonischemic cardiomyopathy) ([facility])   • Coronary artery disease involving native coronary artery of native heart without angina pectoris   • Dyslipidemia   • Chronic combined systolic and diastolic heart failure ([facility])   • Lumbar stenosis with neurogenic claudication   • Spondylosis of lumbar region without myelopathy or radiculopathy   • Spondylolisthesis, lumbar region   • Degeneration of lumbar or lumbosacral intervertebral disc   • Gait disturbance   • Physical deconditioning   • Sarcopenia   • Weakness   • Anemia   • Fall   • Dizziness   • Acoustic neuroma ([facility])   • Elevated troponin   • CHF exacerbation ([facility])   • Elevated serum creatinine        Past Medical History:   Diagnosis Date   • Arthritis    • Asthma  - double pneumonia    Currently on inhaler and nebulizer   • Cancer ([facility])     cervical cancer, skin cancer   • CHF (congestive heart failure) ([facility]) 2021   • Chronic kidney disease Related to diabetes   • Coronary artery disease 2021 DX for hear failure   • Diabetes mellitus ([facility]) 30 years    Seeing Dr. Lam 1st time Aug 19   • Gout    • Hx of  colonoscopy    • Hyperlipidemia Reference current labs x 2-3yrs approx   • Hypertension 30 years   • Migraine    • Mitral valve disease    • Mitral valve disease    • Osteomyelitis (HCC)    • Peripheral neuropathy    • Sleep apnea    • Type 2 diabetes mellitus (HCC)     30 years        Past Surgical History:   Procedure Laterality Date   • ABDOMINAL HYSTERECTOMY W/SALPINGECTOMY     • AMPUTATION  Right great toe, 1st 1/3 metatarsal -[facility] 4/14/21   • AORTAGRAM N/A 4/9/2021    Procedure: AORTAGRAM WITH OR WITHOUT RUNOFFS, WITH Co2;  Surgeon: Trey Forrest MD;  [facility];  Service: Vascular;  Laterality: N/A;   • APPENDECTOMY     • BRAIN TUMOR EXCISION      laser surgery    • CARDIAC CATHETERIZATION N/A 6/21/2021    Procedure: LEFT HEART CATH;  Surgeon: Anjum Ceballos MD;  Location:  [facility] CATH INVASIVE [facility];  Service: Cardiology;  Laterality: N/A;   • CATARACT EXTRACTION W/ INTRAOCULAR LENS  IMPLANT, BILATERAL     • CHOLECYSTECTOMY     • EYE SURGERY     • HYSTERECTOMY     • TOE SURGERY     • TRANS METATARSAL AMPUTATION Right 4/14/2021    Procedure: AMPUTATION TRANS METATARSAL RIGHT GREAT TOE;  Surgeon: Valdez Finney MD;  Location:  [facility];  Service: Vascular;  Laterality: Right;           Wound 07/27/22 0134 Bilateral anterior groin MASD (Moisture associated skin damage) (Active)   Dressing Appearance intact;dry 08/02/22 0800   Closure Open to air 08/02/22 1530   Base clean;dry;pink 08/02/22 1530   Drainage Amount none 08/02/22 1530   Care, Wound cleansed with 08/01/22 2001   Dressing Care other (see comments) 08/02/22 0800       Wound 07/27/22 0134 upper coccyx Pressure Injury (Active)   Dressing Appearance open to air 08/01/22 2001   Closure Open to air 08/02/22 1530   Base blanchable;clean;red 08/02/22 1530   Drainage Amount none 08/02/22 1530   Care, Wound cleansed with 08/01/22 2001   Dressing Care skin barrier agent applied 08/01/22 2001       Wound 07/27/22 0134 Right anterior knee  Abrasion (Active)   Dressing Appearance intact;dry 08/02/22 1330   Closure DERICK 08/02/22 1530   Base dressing in place, unable to visualize 08/02/22 1530   Periwound intact 08/02/22 1330   Periwound Temperature warm 08/02/22 1330   Periwound Skin Turgor soft 08/02/22 1330   Edges irregular 08/02/22 1330   Drainage Characteristics/Odor serosanguineous 08/02/22 1330   Drainage Amount none 08/02/22 1530   Care, Wound irrigated with;sterile normal saline;debrided 08/02/22 1330   Dressing Care foam;low-adherent;petroleum-based 08/02/22 1330         WOUND DEBRIDEMENT  Total area of Debridement: ~4cm2  Debridement Site 1  Location- Site 1: R ant knee.  Selective Debridement- Site 1: Wound Surface <20cmsq  Instruments- Site 1: tweezers  Excised Tissue Description- Site 1: minimum, slough  Bleeding- Site 1: seeping, held pressure, 1 minute               PT Assessment (last 12 hours)     PT Evaluation and Treatment     Row Name 08/02/22 1330          Physical Therapy Time and Intention    Subjective Information complains of;weakness;fatigue;pain  -MF     Document Type therapy note (daily note);wound care  -     Mode of Treatment individual therapy;physical therapy  -     Row Name 08/02/22 1330          Pain    Pretreatment Pain Rating 5/10  -MF     Posttreatment Pain Rating 5/10  -MF     Pain Location - Side/Orientation Bilateral  -MF     Pain Location lower  -MF     Pain Location - extremity  -MF     Pain Intervention(s) Repositioned  -     Row Name             Wound 07/27/22 0134 Bilateral anterior groin MASD (Moisture associated skin damage)    Wound - Properties Group Placement Date: 07/27/22  -NB Placement Time: 0134 -NB Present on Hospital Admission: Y  -NB Side: Bilateral  -NB Orientation: anterior  -NB Location: groin  -NB Primary Wound Type: MASD  -NB     Retired Wound - Properties Group Placement Date: 07/27/22  -NB Placement Time: 0134 -NB Present on Hospital Admission: Y  -NB Side: Bilateral  -NB  Orientation: anterior  -NB Location: groin  -NB Primary Wound Type: MASD  -NB     Retired Wound - Properties Group Date first assessed: 07/27/22  -NB Time first assessed: 0134 -NB Present on Hospital Admission: Y  -NB Side: Bilateral  -NB Location: groin  -NB Primary Wound Type: MASD  -NB     Row Name             Wound 07/27/22 0134 upper coccyx Pressure Injury    Wound - Properties Group Placement Date: 07/27/22  -NB Placement Time: 0134 -NB Orientation: upper  -NB Location: coccyx  -NB Primary Wound Type: Pressure inj  -NB Additional Comments: Pinpoint sized open area surrounded by pink blanchable area  -NB     Retired Wound - Properties Group Placement Date: 07/27/22  -NB Placement Time: 0134 -NB Orientation: upper  -NB Location: coccyx  -NB Primary Wound Type: Pressure inj  -NB Additional Comments: Pinpoint sized open area surrounded by pink blanchable area  -NB     Retired Wound - Properties Group Date first assessed: 07/27/22  -NB Time first assessed: 0134 -NB Location: coccyx  -NB Primary Wound Type: Pressure inj  -NB Additional Comments: Pinpoint sized open area surrounded by pink blanchable area  -NB     Row Name 08/02/22 1330          Wound 07/27/22 0134 Right anterior knee Abrasion    Wound - Properties Group Placement Date: 07/27/22  -NB Placement Time: 0134 -NB Side: Right  -NB Orientation: anterior  -NB Location: knee  -NB Primary Wound Type: Abrasion  -NB     Dressing Appearance intact;dry  -MF     Base moist;pink;red;slough  -MF     Periwound intact  -MF     Periwound Temperature warm  -MF     Periwound Skin Turgor soft  -MF     Edges irregular  -MF     Drainage Characteristics/Odor serosanguineous  -MF     Drainage Amount small  -MF     Care, Wound irrigated with;sterile normal saline;debrided  -MF     Dressing Care foam;low-adherent;petroleum-based  -MF     Retired Wound - Properties Group Placement Date: 07/27/22  -NB Placement Time: 0134 -NB Side: Right  -NB Orientation: anterior  -NB  Location: knee  -NB Primary Wound Type: Abrasion  -NB     Retired Wound - Properties Group Date first assessed: 07/27/22  -NB Time first assessed: 0134  -NB Side: Right  -NB Location: knee  -NB Primary Wound Type: Abrasion  -NB     Row Name 08/02/22 1330          Coping    Observed Emotional State restless;cooperative  -     Verbalized Emotional State acceptance  -     Trust Relationship/Rapport care explained  -     Row Name 08/02/22 1330          Plan of Care Review    Plan of Care Reviewed With patient  -MF     Outcome Evaluation PT held LE compression today due to pt having increased c/o pain / discomfort in BLEs due to edema and pitting edema in thighs.  PTwill recheck in 2-3 days for possible restart of edema.  -     Row Name 08/02/22 9990          Positioning and Restraints    Pre-Treatment Position in bed  -     Post Treatment Position bed  -MF     In Bed supine;call light within reach  -           User Key  (r) = Recorded By, (t) = Taken By, (c) = Cosigned By    Initials Name Provider Type    Avtar Small, PT Physical Therapist    Rosa Elena Broderick RN Registered Nurse              Physical Therapy Education                 Title: PT OT SLP Therapies (Done)     Topic: Physical Therapy (Done)     Point: Mobility training (Done)     Learning Progress Summary           Patient Eager, E, VU,NR by  at 8/2/2022 1546    Comment: Reviewed safety/technique with bed mobility, transfers, HEP, importance of elevation of BUE and BLE, PT POC    Eager, E, VU,DU,NR by SS at 7/29/2022 1645    Comment: Reviewed safety/technique with transfers, HEP, PT POC    Acceptance, E,TB, VU by  at 7/28/2022 0915    Comment: Patient educated about benefit of skilled PT services and POC with MLW    Eager, E, VU,NR by SS at 7/27/2022 1201    Comment: Educated pt. safety/technique with bed mobility, transfers, PT POC   Family Eager, E, VU,NR by  at 8/2/2022 1546    Comment: Reviewed safety/technique with bed  mobility, transfers, HEP, importance of elevation of BUE and BLE, PT POC                   Point: Home exercise program (Done)     Learning Progress Summary           Patient Eager, E, VU,NR by SS at 8/2/2022 1546    Comment: Reviewed safety/technique with bed mobility, transfers, HEP, importance of elevation of BUE and BLE, PT POC    Eager, E, VU,DU,NR by SS at 7/29/2022 1645    Comment: Reviewed safety/technique with transfers, HEP, PT POC    Acceptance, E,TB, VU by  at 7/28/2022 0915    Comment: Patient educated about benefit of skilled PT services and POC with MLW   Family Eager, E, VU,NR by SS at 8/2/2022 1546    Comment: Reviewed safety/technique with bed mobility, transfers, HEP, importance of elevation of BUE and BLE, PT POC                   Point: Body mechanics (Done)     Learning Progress Summary           Patient Eager, E, VU,NR by SS at 8/2/2022 1546    Comment: Reviewed safety/technique with bed mobility, transfers, HEP, importance of elevation of BUE and BLE, PT POC    Eager, E, VU,DU,NR by SS at 7/29/2022 1645    Comment: Reviewed safety/technique with transfers, HEP, PT POC    Acceptance, E,TB, VU by  at 7/28/2022 0915    Comment: Patient educated about benefit of skilled PT services and POC with MLW    Eager, E, VU,NR by  at 7/27/2022 1201    Comment: Educated pt. safety/technique with bed mobility, transfers, PT POC   Family Eager, E, VU,NR by  at 8/2/2022 1546    Comment: Reviewed safety/technique with bed mobility, transfers, HEP, importance of elevation of BUE and BLE, PT POC                   Point: Precautions (Done)     Learning Progress Summary           Patient Eager, E, VU,NR by  at 8/2/2022 1546    Comment: Reviewed safety/technique with bed mobility, transfers, HEP, importance of elevation of BUE and BLE, PT POC    Eager, E, VU,DU,NR by SS at 7/29/2022 1645    Comment: Reviewed safety/technique with transfers, HEP, PT POC    Acceptance, E,TB, VU by  at 7/28/2022 0915     Comment: Patient educated about benefit of skilled PT services and POC with MLW    ELMO Drake VU,NR by  at 7/27/2022 1201    Comment: Educated pt. safety/technique with bed mobility, transfers, PT POC   Family ELMO Drake VU,NR by  at 8/2/2022 1546    Comment: Reviewed safety/technique with bed mobility, transfers, HEP, importance of elevation of BUE and BLE, PT POC                               User Key     Initials Effective Dates Name Provider Type Discipline     06/01/21 -  Sheron Ashton, PT Physical Therapist PT     01/27/22 -  Debra Henriquez, PT Physical Therapist PT                Recommendation and Plan  Anticipated Equipment Needs at Discharge (PT): other (see comments) (has all necessary DME)  Anticipated Discharge Disposition (PT): skilled nursing facility  Planned Therapy Interventions (PT): balance training, bed mobility training, home exercise program, neuromuscular re-education, patient/family education, postural re-education, ROM (range of motion), strengthening, stretching, transfer training  Therapy Frequency (PT): daily  Plan of Care Reviewed With: patient           Outcome Evaluation: PT held LE compression today due to pt having increased c/o pain / discomfort in BLEs due to edema and pitting edema in thighs.  PTwill recheck in 2-3 days for possible restart of edema.  Plan of Care Reviewed With: patient            Time Calculation   PT Charges     Row Name 08/02/22 1330             Time Calculation    Start Time 1330  -MF      PT Goal Re-Cert Due Date 08/06/22  -MF              Untimed Charges    Wound Care 25243 Selective debridement  -      [id number] debridement 15  -MF              Total Minutes    Untimed Charges Total Minutes 15  -MF       Total Minutes 15  -MF            User Key  (r) = Recorded By, (t) = Taken By, (c) = Cosigned By    Initials Name Provider Type    Avtar Small, PT Physical Therapist                  Therapy Charges for Today     Code  Description Service Date Service Provider Modifiers Qty    41457523778 HC PIPE DEBRIDE OPEN WOUND UP TO 20CM 8/2/2022 Avtar Ayala, PT GP 1            PT G-Codes  Outcome Measure Options: AM-PAC 6 Clicks Basic Mobility (PT)  AM-PAC 6 Clicks Score (PT): 14  AM-PAC 6 Clicks Score (OT): 13       Avtar Ayala, PT  8/2/2022

## 2022-08-03 ENCOUNTER — APPOINTMENT (OUTPATIENT)
Dept: GENERAL RADIOLOGY | Facility: HOSPITAL | Age: 82
End: 2022-08-03

## 2022-08-03 LAB
ANION GAP SERPL CALCULATED.3IONS-SCNC: 9 MMOL/L (ref 5–15)
BACTERIA FLD CULT: NORMAL
BACTERIA SPEC AEROBE CULT: NORMAL
BACTERIA SPEC AEROBE CULT: NORMAL
BUN SERPL-MCNC: 65 MG/DL (ref 8–23)
BUN/CREAT SERPL: 47.1 (ref 7–25)
CALCIUM SPEC-SCNC: 8.7 MG/DL (ref 8.6–10.5)
CHLORIDE SERPL-SCNC: 102 MMOL/L (ref 98–107)
CO2 SERPL-SCNC: 32 MMOL/L (ref 22–29)
CREAT SERPL-MCNC: 1.38 MG/DL (ref 0.57–1)
EGFRCR SERPLBLD CKD-EPI 2021: 38.1 ML/MIN/1.73
GLUCOSE BLDC GLUCOMTR-MCNC: 132 MG/DL (ref 70–130)
GLUCOSE BLDC GLUCOMTR-MCNC: 190 MG/DL (ref 70–130)
GLUCOSE BLDC GLUCOMTR-MCNC: 193 MG/DL (ref 70–130)
GLUCOSE BLDC GLUCOMTR-MCNC: 65 MG/DL (ref 70–130)
GLUCOSE SERPL-MCNC: 163 MG/DL (ref 65–99)
GRAM STN SPEC: NORMAL
GRAM STN SPEC: NORMAL
NT-PROBNP SERPL-MCNC: 3452 PG/ML (ref 0–1800)
POTASSIUM SERPL-SCNC: 4.5 MMOL/L (ref 3.5–5.2)
SODIUM SERPL-SCNC: 143 MMOL/L (ref 136–145)

## 2022-08-03 PROCEDURE — 99232 SBSQ HOSP IP/OBS MODERATE 35: CPT | Performed by: PHYSICIAN ASSISTANT

## 2022-08-03 PROCEDURE — 25010000002 HEPARIN (PORCINE) PER 1000 UNITS: Performed by: INTERNAL MEDICINE

## 2022-08-03 PROCEDURE — 82962 GLUCOSE BLOOD TEST: CPT

## 2022-08-03 PROCEDURE — 71045 X-RAY EXAM CHEST 1 VIEW: CPT

## 2022-08-03 PROCEDURE — 63710000001 INSULIN LISPRO (HUMAN) PER 5 UNITS: Performed by: PHYSICIAN ASSISTANT

## 2022-08-03 PROCEDURE — 83880 ASSAY OF NATRIURETIC PEPTIDE: CPT | Performed by: PHYSICIAN ASSISTANT

## 2022-08-03 PROCEDURE — 80048 BASIC METABOLIC PNL TOTAL CA: CPT | Performed by: PHYSICIAN ASSISTANT

## 2022-08-03 PROCEDURE — 63710000001 INSULIN DETEMIR PER 5 UNITS: Performed by: PHYSICIAN ASSISTANT

## 2022-08-03 RX ORDER — DIAZEPAM 2 MG/1
4 TABLET ORAL EVERY 8 HOURS PRN
Status: DISCONTINUED | OUTPATIENT
Start: 2022-08-03 | End: 2022-08-04

## 2022-08-03 RX ORDER — BACLOFEN 10 MG/1
10 TABLET ORAL EVERY 8 HOURS PRN
Status: DISCONTINUED | OUTPATIENT
Start: 2022-08-03 | End: 2022-08-04

## 2022-08-03 RX ORDER — OXYCODONE HYDROCHLORIDE 5 MG/1
2.5 TABLET ORAL EVERY 4 HOURS PRN
Status: DISCONTINUED | OUTPATIENT
Start: 2022-08-03 | End: 2022-08-04

## 2022-08-03 RX ORDER — GUAIFENESIN 600 MG/1
600 TABLET, EXTENDED RELEASE ORAL EVERY 12 HOURS SCHEDULED
Status: DISCONTINUED | OUTPATIENT
Start: 2022-08-03 | End: 2022-08-08

## 2022-08-03 RX ORDER — BUMETANIDE 1 MG/1
1 TABLET ORAL
Status: DISCONTINUED | OUTPATIENT
Start: 2022-08-03 | End: 2022-08-06

## 2022-08-03 RX ORDER — INSULIN LISPRO 100 [IU]/ML
9 INJECTION, SOLUTION INTRAVENOUS; SUBCUTANEOUS
Status: DISCONTINUED | OUTPATIENT
Start: 2022-08-03 | End: 2022-08-03

## 2022-08-03 RX ORDER — INSULIN LISPRO 100 [IU]/ML
6 INJECTION, SOLUTION INTRAVENOUS; SUBCUTANEOUS
Status: DISCONTINUED | OUTPATIENT
Start: 2022-08-04 | End: 2022-08-08

## 2022-08-03 RX ADMIN — HEPARIN SODIUM 5000 UNITS: 5000 INJECTION INTRAVENOUS; SUBCUTANEOUS at 21:23

## 2022-08-03 RX ADMIN — DIAZEPAM 4 MG: 2 TABLET ORAL at 21:26

## 2022-08-03 RX ADMIN — METHOCARBAMOL 750 MG: 750 TABLET ORAL at 00:00

## 2022-08-03 RX ADMIN — ALLOPURINOL 300 MG: 300 TABLET ORAL at 08:14

## 2022-08-03 RX ADMIN — VITAM B12 100 MCG: 100 TAB at 08:14

## 2022-08-03 RX ADMIN — CASTOR OIL AND BALSAM, PERU 1 APPLICATION: 788; 87 OINTMENT TOPICAL at 21:27

## 2022-08-03 RX ADMIN — HEPARIN SODIUM 5000 UNITS: 5000 INJECTION INTRAVENOUS; SUBCUTANEOUS at 08:13

## 2022-08-03 RX ADMIN — BACLOFEN 10 MG: 10 TABLET ORAL at 13:22

## 2022-08-03 RX ADMIN — METHOCARBAMOL 750 MG: 750 TABLET ORAL at 08:14

## 2022-08-03 RX ADMIN — ISOSORBIDE DINITRATE 20 MG: 20 TABLET ORAL at 21:25

## 2022-08-03 RX ADMIN — PREGABALIN 50 MG: 50 CAPSULE ORAL at 08:14

## 2022-08-03 RX ADMIN — BUMETANIDE 1 MG: 1 TABLET ORAL at 17:07

## 2022-08-03 RX ADMIN — AMLODIPINE BESYLATE 5 MG: 5 TABLET ORAL at 08:14

## 2022-08-03 RX ADMIN — POLYETHYLENE GLYCOL 3350 17 G: 17 POWDER, FOR SOLUTION ORAL at 08:13

## 2022-08-03 RX ADMIN — DICLOFENAC 2 G: 10 GEL TOPICAL at 17:08

## 2022-08-03 RX ADMIN — INSULIN DETEMIR 22 UNITS: 100 INJECTION, SOLUTION SUBCUTANEOUS at 08:13

## 2022-08-03 RX ADMIN — HYDRALAZINE HYDROCHLORIDE 25 MG: 25 TABLET, FILM COATED ORAL at 21:24

## 2022-08-03 RX ADMIN — DIAZEPAM 4 MG: 2 TABLET ORAL at 13:22

## 2022-08-03 RX ADMIN — HYDRALAZINE HYDROCHLORIDE 25 MG: 25 TABLET, FILM COATED ORAL at 05:20

## 2022-08-03 RX ADMIN — DICLOFENAC 2 G: 10 GEL TOPICAL at 08:41

## 2022-08-03 RX ADMIN — DICLOFENAC 2 G: 10 GEL TOPICAL at 21:27

## 2022-08-03 RX ADMIN — GUAIFENESIN 600 MG: 600 TABLET, EXTENDED RELEASE ORAL at 21:25

## 2022-08-03 RX ADMIN — SENNOSIDES AND DOCUSATE SODIUM 1 TABLET: 50; 8.6 TABLET ORAL at 08:14

## 2022-08-03 RX ADMIN — Medication 10 ML: at 21:26

## 2022-08-03 RX ADMIN — ASPIRIN 81 MG: 81 TABLET, COATED ORAL at 08:14

## 2022-08-03 RX ADMIN — LIDOCAINE 1 PATCH: 50 PATCH CUTANEOUS at 08:14

## 2022-08-03 RX ADMIN — ATORVASTATIN CALCIUM 80 MG: 40 TABLET, FILM COATED ORAL at 21:25

## 2022-08-03 RX ADMIN — SENNOSIDES AND DOCUSATE SODIUM 1 TABLET: 50; 8.6 TABLET ORAL at 21:25

## 2022-08-03 RX ADMIN — INSULIN LISPRO 9 UNITS: 100 INJECTION, SOLUTION INTRAVENOUS; SUBCUTANEOUS at 12:15

## 2022-08-03 RX ADMIN — ISOSORBIDE DINITRATE 20 MG: 20 TABLET ORAL at 08:14

## 2022-08-03 RX ADMIN — BUMETANIDE 1 MG: 1 TABLET ORAL at 08:42

## 2022-08-03 RX ADMIN — INSULIN LISPRO 9 UNITS: 100 INJECTION, SOLUTION INTRAVENOUS; SUBCUTANEOUS at 08:12

## 2022-08-03 RX ADMIN — PREGABALIN 50 MG: 50 CAPSULE ORAL at 21:25

## 2022-08-03 RX ADMIN — ACETAMINOPHEN 650 MG: 325 TABLET, FILM COATED ORAL at 08:13

## 2022-08-03 RX ADMIN — DICLOFENAC 2 G: 10 GEL TOPICAL at 12:15

## 2022-08-03 RX ADMIN — AMOXICILLIN AND CLAVULANATE POTASSIUM 1 TABLET: 875; 125 TABLET, FILM COATED ORAL at 08:14

## 2022-08-03 RX ADMIN — HYDRALAZINE HYDROCHLORIDE 25 MG: 25 TABLET, FILM COATED ORAL at 13:22

## 2022-08-03 NOTE — PROGRESS NOTES
INFECTIOUS DISEASE  Progress Note    Susan Anderson  1940  [id number]    Admission Date: 7/26/2022      Requesting Provider: Brigid Seo MD  Evaluating Physician: Stef Pete MD    Reason for Consultation: BCID positive for MRSA, recs on abx- CKD currently on vanc    History of present illness:    7/29/22: Patient is a 82 y.o. female with h/o T2DM, CHF, CKD Stage III, HTN, chronic BLE edema, chronic right lymphedema, ELIUD/nocturnal 2L O2 NC, acoustic neuroma, CAD, T2DM, gout, HLD, asthma, PVD/right great toe TMA 4/2021/now on oral Augmentin therapy for 6 to 12 weeks from 5/19/22, cervical cancer/hysterectomy, and chronic mixed diastolic/systolic CHF, and NICM with EF 40-45% who we were asked to see for MRSA bacteremia.  The patient presented to [facility] ED on 7/26 after falling at home and pinned between the toilet and her wheelchair.  She lives alone and is unsure what happened, but states that she did not lose consciousness.  She had episode of severe diarrhea before and after falling.  Her daughter found her on the floor and called EMS.  She developed a spasm in her left lower back radiating to her hip and LLE making it difficult to get her up off the floor.  He also scraped her right knee.  Her bumex was recently doubled for 3 days for BLE edema.  She is not very mobile or independent requiring wheelchair for mobilization and requires assistance for showering.  She denies fever, chills, shortness of breath, nausea, vomiting, diarrhea, or dysuria.  She has had some nasal congestion.  On arrival to ED, her Tlow was 93.9 and her HR was bradycardic.  Admitting labs were A1C 9.0, WBC 6300 with 81% segs/2% bands, , creatinine 1.8 (baseline around 1.2-1.4), PCT 0.09, lactic acid 1.1, and UA WBC 0-2.  Blood cultures are positive in 2 of 2 sets (4 of 4 bottles) with GPC and BCID positive for MRSA and CoNS.  A COVID-19/Flu PCR was negative.  Imaging showed no evidence of acute fractures.  She had some  swelling of right side of periorbital/cheek soft tissue, chronic right maxillary sinusitis, mild pulmonary edema, subcutaneous edema of right chest and lateral abdominal wall with small volume ascites similar to 7/13/22, and prepatellar soft tissue swelling c/w hematoma or bursitis.  A CT scan of head showed no bleeds. She is currently on Vancomycin.  ID was asked to evaluate and manage her antibiotic therapy.    Her daughter indicates that she was lying on the floor at home for least 10 hours, unable to arise.  Her usual caretaker did not come to the house that day.    7/30/22: She has remained afebrile. Repeat blood cultures from 7/29 are pending.  Right prepatellar aspirate Gram stain revealed rare white blood cells with no organisms seen. Blood cultures from 726 have grown Staph epidermidis in both sets and MRSA in 1 set. Echocardiogram yesterday revealed mild mitral regurgitation.  She continues to complain of severe right knee pain.  Orthopedics has ordered an MRI scan.    8/1/22: She has remained afebrile. Follow-up blood cultures from 7/29 at been negative.  Right prepatellar aspirate culture is negative so far.  MRI scan of the right knee Revealed a moderate knee effusion which was nonspecific.  She had diffuse circumferential soft tissue swelling with isointense prepatellar collection consistent with hematoma versus infectious bursitis versus posttraumatic.  She was also noted to have complex degenerative tearing of the menisci.  She had an indistinct anterior cruciate ligament. White blood cell count today is 5.8.  Creatinine is 1.43.  She feels better today.  She has some improvement in her right knee pain.     8/2/22: She has remained afebrile.  She still complains of right knee pain.  She was able to get out of bed with assistance yesterday. Her creatinine today is 1.34.  Her white blood cell count is 4.7. Overall, she feels somewhat better.        Past Medical History:   Diagnosis Date   • Arthritis     • Asthma 6/4 - double pneumonia    Currently on inhaler and nebulizer   • Cancer (HCC)     cervical cancer, skin cancer   • CHF (congestive heart failure) ([facility]) June 4, 2021   • Chronic kidney disease Related to diabetes   • Coronary artery disease 6/4/2021 DX for hear failure   • Diabetes mellitus (HCC) 30 years    Seeing Dr. Lam 1st time Aug 19   • Gout    • Hx of colonoscopy    • Hyperlipidemia Reference current labs x 2-3yrs approx   • Hypertension 30 years   • Migraine    • Mitral valve disease    • Mitral valve disease    • Osteomyelitis (HCC)    • Peripheral neuropathy    • Sleep apnea    • Type 2 diabetes mellitus (HCC)     30 years       Past Surgical History:   Procedure Laterality Date   • ABDOMINAL HYSTERECTOMY W/SALPINGECTOMY     • AMPUTATION  Right great toe, 1st 1/3 metatarsal -Reg 4/14/21   • AORTAGRAM N/A 4/9/2021    Procedure: AORTAGRAM WITH OR WITHOUT RUNOFFS, WITH Co2;  Surgeon: Trey Forrest MD;  Location:  [facility];  Service: Vascular;  Laterality: N/A;   • APPENDECTOMY     • BRAIN TUMOR EXCISION      laser surgery    • CARDIAC CATHETERIZATION N/A 6/21/2021    Procedure: LEFT HEART CATH;  Surgeon: Anjum Ceballos MD;  Location:  [facility];  Service: Cardiology;  Laterality: N/A;   • CATARACT EXTRACTION W/ INTRAOCULAR LENS  IMPLANT, BILATERAL     • CHOLECYSTECTOMY     • EYE SURGERY     • HYSTERECTOMY     • TOE SURGERY     • TRANS METATARSAL AMPUTATION Right 4/14/2021    Procedure: AMPUTATION TRANS METATARSAL RIGHT GREAT TOE;  Surgeon: Valdez Finney MD;  Location:  [facility];  Service: Vascular;  Laterality: Right;       Family History   Problem Relation Age of Onset   • Diabetes Mother         Type II   • Heart disease Father    • Heart attack Father    • Coronary artery disease Father    • Diabetes Father         Type II   • Diabetes Sister    • Diabetes Maternal Grandmother    • Diabetes Maternal Grandfather    • Diabetes Paternal Grandmother    • Diabetes  Paternal Grandfather        Social History     Socioeconomic History   • Marital status:    • Number of children: 1   Tobacco Use   • Smoking status: Never Smoker   • Smokeless tobacco: Never Used   Vaping Use   • Vaping Use: Never used   Substance and Sexual Activity   • Alcohol use: Yes     Comment: Social drinking when not recovering from hospitalization   • Drug use: Never   • Sexual activity: Not Currently     Birth control/protection: None       Allergies   Allergen Reactions   • Cephalexin Nausea Only   • Erythromycin Base Nausea Only   • Melatonin Other (See Comments)     Nightmares   • Oxycodone Nausea Only   • Sulfa Antibiotics Nausea Only         Medication:    Current Facility-Administered Medications:   •  acetaminophen (TYLENOL) tablet 650 mg, 650 mg, Oral, Q4H PRN, 650 mg at 07/31/22 1243 **OR** acetaminophen (TYLENOL) 160 MG/5ML solution 650 mg, 650 mg, Oral, Q4H PRN **OR** acetaminophen (TYLENOL) suppository 650 mg, 650 mg, Rectal, Q4H PRN, Breana, Chey G, DO  •  albumin human 25 % IV SOLN 12.5 g, 12.5 g, Intravenous, BID, Stalin, Jamie Cristobal MD, 12.5 g at 08/02/22 1259  •  allopurinol (ZYLOPRIM) tablet 300 mg, 300 mg, Oral, Daily, Breana, Chey G, DO, 300 mg at 08/02/22 0821  •  amLODIPine (NORVASC) tablet 5 mg, 5 mg, Oral, Daily, Breana, Chey G, DO, 5 mg at 08/02/22 0821  •  amoxicillin-clavulanate (AUGMENTIN) 875-125 MG per tablet 1 tablet, 1 tablet, Oral, Daily, Stef Pete MD, 1 tablet at 08/02/22 0821  •  aspirin EC tablet 81 mg, 81 mg, Oral, Daily, Breana, Chey G, DO, 81 mg at 08/02/22 0821  •  atorvastatin (LIPITOR) tablet 80 mg, 80 mg, Oral, Nightly, Anna Crystal PA-C  •  bisacodyl (DULCOLAX) EC tablet 10 mg, 10 mg, Oral, Daily PRN, Anna Crystal PA-C, 10 mg at 08/02/22 0821  •  bisacodyl (DULCOLAX) suppository 10 mg, 10 mg, Rectal, Daily PRN, Anna Crystal, PASundayC  •  bumetanide (BUMEX) tablet 1 mg, 1 mg, Oral, Daily, Anjum Ceballos MD, 1 mg at  08/02/22 0823  •  castor oil-balsam peru (VENELEX) ointment 1 application, 1 application, Topical, Q12H, Brigid Seo MD, 1 application at 08/02/22 0826  •  dextrose (D50W) (25 g/50 mL) IV injection 25 g, 25 g, Intravenous, Q15 Min PRN, Breana Chey G, DO  •  dextrose (GLUTOSE) oral gel 15 g, 15 g, Oral, Q15 Min PRN, BreanaMini hawthornesie G, DO  •  Diclofenac Sodium (VOLTAREN) 1 % gel 2 g, 2 g, Topical, 4x Daily, Brigid Seo MD, 2 g at 08/02/22 1804  •  glucagon (human recombinant) (GLUCAGEN DIAGNOSTIC) injection 1 mg, 1 mg, Intramuscular, Q15 Min PRN, BreanaMini hawthornesie G, DO  •  guaifenesin-dextromethorphan (MUCINEX DM) tablet 1 tablet, 1 tablet, Oral, BID PRN, Brigid Seo MD, 1 tablet at 08/02/22 0821  •  heparin (porcine) 5000 UNIT/ML injection 5,000 Units, 5,000 Units, Subcutaneous, Q12H, Chey Toussaint, DO, 5,000 Units at 08/02/22 0822  •  hydrALAZINE (APRESOLINE) tablet 25 mg, 25 mg, Oral, Q8H, Ruddy Guerrier, DO, 25 mg at 08/02/22 1612  •  [START ON 8/3/2022] insulin detemir (LEVEMIR) injection 22 Units, 22 Units, Subcutaneous, Daily, Anna Crystal PA-C  •  Insulin Lispro (humaLOG) injection 0-7 Units, 0-7 Units, Subcutaneous, TID AC, Anna Crystal PA-C, 3 Units at 08/02/22 1259  •  Insulin Lispro (humaLOG) injection 10 Units, 10 Units, Subcutaneous, TID With Meals, Anna Crystal PA-C  •  isosorbide dinitrate (ISORDIL) tablet 20 mg, 20 mg, Oral, BID, Chey Toussaint, DO, 20 mg at 08/02/22 0821  •  lidocaine (LIDODERM) 5 % 1 patch, 1 patch, Transdermal, Q24H, Chey Toussaint DO, 1 patch at 08/02/22 0823  •  magnesium hydroxide (MILK OF MAGNESIA) suspension 10 mL, 10 mL, Oral, Daily PRN, Anna Crystal PA-C, 10 mL at 08/02/22 0820  •  methocarbamol (ROBAXIN) tablet 750 mg, 750 mg, Oral, Q6H PRN, Ruddy Guerrier DO, 750 mg at 08/01/22 2002  •  ondansetron (ZOFRAN) tablet 4 mg, 4 mg, Oral, Q6H PRN **OR** ondansetron (ZOFRAN) injection 4 mg, 4 mg, Intravenous, Q6H PRN, Chey Toussaint,  DO, 4 mg at 22 0106  •  oxyCODONE (ROXICODONE) immediate release tablet 2.5 mg, 2.5 mg, Oral, Q4H PRN, Chey, Ruddy, DO  •  polyethylene glycol (MIRALAX) packet 17 g, 17 g, Oral, Daily, Chey, Ruddy, DO, 17 g at 22 0820  •  pregabalin (LYRICA) capsule 50 mg, 50 mg, Oral, Q12H, Breana, Chey G, DO, 50 mg at 22 0821  •  sennosides-docusate (PERICOLACE) 8.6-50 MG per tablet 1 tablet, 1 tablet, Oral, BID, Chey, Ruddy, DO, 1 tablet at 22 0821  •  [COMPLETED] Insert peripheral IV, , , Once **AND** sodium chloride 0.9 % flush 10 mL, 10 mL, Intravenous, PRN, Breana, Chey G, DO  •  sodium chloride 0.9 % flush 10 mL, 10 mL, Intravenous, Q12H, Breana, Chey G, DO, 10 mL at 22 0836  •  sodium chloride 0.9 % flush 10 mL, 10 mL, Intravenous, PRN, Breana, Chey G, DO  •  vitamin B-12 (CYANOCOBALAMIN) tablet 100 mcg, 100 mcg, Oral, Daily, Breana, Chey G, DO, 100 mcg at 22 0821    Antibiotics:  Anti-Infectives (From admission, onward)    Ordered     Dose/Rate Route Frequency Start Stop    22 1229  amoxicillin-clavulanate (AUGMENTIN) 875-125 MG per tablet 1 tablet        Ordering Provider: Stef Pete MD    1 tablet Oral Daily 22 1315 22 0859    22 0859  vancomycin (VANCOCIN) 1000 mg/200 mL dextrose 5% IVPB        Ordering Provider: Tom Phelan, PharmD    10 mg/kg × 104 kg  over 60 Minutes Intravenous Once 22 1000 22 1028    22 1824  vancomycin 1750 mg/500 mL 0.9% NS IVPB ([facility])        Ordering Provider: Swiney, Adalid, RPH    20 mg/kg × 90.7 kg  over 105 Minutes Intravenous Once 22            Review of Systems:  See HPI    Physical Exam:   Vital Signs  Temp (24hrs), Av °F (36.7 °C), Min:97.7 °F (36.5 °C), Max:98.7 °F (37.1 °C)    Temp  Min: 97.7 °F (36.5 °C)  Max: 98.7 °F (37.1 °C)  BP  Min: 135/78  Max: 166/70  Pulse  Min: 63  Max: 79  Resp  Min: 16  Max: 18  SpO2  Min: 90 %  Max: 95 %    GENERAL: She is drowsy  but in no acute distress  HEENT: Normocephalic, atraumatic.  PERRL. EOMI. No conjunctival injection. No icterus. Oropharynx clear without evidence of thrush or exudate.   NECK: Supple   HEART: RRR; No murmur, rubs, gallops.   LUNGS: Clear to auscultation bilaterally without wheezing, rales, rhonchi. Normal respiratory effort. Nonlabored. No dullness.  ABDOMEN: Soft, nontender, nondistended. No rebound or guarding. NO mass or HSM.  EXT:  No cyanosis, clubbing or edema. No cord.  :  Without Bui catheter.  MSK: Her right pretibial wound is 3-4 cm in diameter with some residual slough but no cellulitis.  There is some residual prepatellar swelling secondary to hematoma.  SKIN: Warm and dry without cutaneous eruptions on Inspection/palpation.    NEURO: Drowsy but arousable.  She moves all of her extremities.    Laboratory Data    Results from last 7 days   Lab Units 08/02/22  0701 08/01/22  0708 07/31/22  0525   WBC 10*3/mm3 4.72 5.77 5.32   HEMOGLOBIN g/dL 9.7* 9.5* 9.1*   HEMATOCRIT % 30.7* 29.5* 27.1*   PLATELETS 10*3/mm3 147 188 184     Results from last 7 days   Lab Units 08/02/22  0701   SODIUM mmol/L 143   POTASSIUM mmol/L 4.6   CHLORIDE mmol/L 104   CO2 mmol/L 31.0*   BUN mg/dL 65*   CREATININE mg/dL 1.34*   GLUCOSE mg/dL 167*   CALCIUM mg/dL 9.2     Results from last 7 days   Lab Units 07/31/22  0525 07/27/22  0427 07/26/22  2300   ALK PHOS U/L 197*   < > 240*   BILIRUBIN mg/dL 0.7   < > 1.5*  1.5*   BILIRUBIN DIRECT mg/dL  --   --  0.3   ALT (SGPT) U/L 24   < > 37*   AST (SGOT) U/L 29   < > 39*    < > = values in this interval not displayed.             Results from last 7 days   Lab Units 07/26/22  2300   LACTATE mmol/L 1.1     Results from last 7 days   Lab Units 07/31/22  1647 07/29/22  1456 07/27/22  0427   CK TOTAL U/L 344* 334* 720*     Results from last 7 days   Lab Units 07/28/22  0508   VANCOMYCIN RM mcg/mL 15.40     Estimated Creatinine Clearance: 35.3 mL/min (A) (by C-G formula based on SCr of  1.34 mg/dL (H)).      Microbiology:  Microbiology Results (last 10 days)     Procedure Component Value - Date/Time    Eosinophil Smear - Urine, Urine, Clean Catch [[id number]]  (Normal) Collected: 07/31/22 1503    Lab Status: Final result Specimen: Urine, Clean Catch Updated: 07/31/22 1627     Eosinophil Smear 0 % EOS/100 Cells     Narrative:      No eosinophil seen    Blood Culture - Blood, Arm, Right [[id number]]  (Normal) Collected: 07/29/22 1456    Lab Status: Preliminary result Specimen: Blood from Arm, Right Updated: 08/02/22 1517     Blood Culture No growth at 4 days    Blood Culture - Blood, Hand, Left [[id number]]  (Normal) Collected: 07/29/22 1456    Lab Status: Preliminary result Specimen: Blood from Hand, Left Updated: 08/02/22 1517     Blood Culture No growth at 4 days    Body Fluid Culture - Body Fluid, Knee, Right [[id number]] Collected: 07/29/22 1400    Lab Status: Preliminary result Specimen: Body Fluid from Knee, Right Updated: 08/02/22 0804     Body Fluid Culture No growth at 4 days     Gram Stain Rare (1+) WBCs seen      No organisms seen    COVID PRE-OP / PRE-PROCEDURE SCREENING ORDER (NO ISOLATION) - Swab, Nasopharynx [[id number]]  (Normal) Collected: 07/26/22 2300    Lab Status: Final result Specimen: Swab from Nasopharynx Updated: 07/26/22 2340    Narrative:      The following orders were created for panel order COVID PRE-OP / PRE-PROCEDURE SCREENING ORDER (NO ISOLATION) - Swab, Nasopharynx.  Procedure                               Abnormality         Status                     ---------                               -----------         ------                     COVID-19 and FLU A/B PCR...[[id number]]  Normal              Final result                 Please view results for these tests on the individual orders.    Blood Culture - Blood, Arm, Left [[id number]]  (Abnormal)  (Susceptibility) Collected: 07/26/22 2300    Lab Status: Edited Result - FINAL Specimen: Blood from Arm, Left Updated: 07/30/22  0819     Blood Culture Staphylococcus aureus, MRSA     Comment: Infectious disease consultation is highly recommended to rule out distant foci of infection.  Methicillin resistant Staphylococcus aureus, Patient may be an isolation risk.        Isolated from Aerobic Bottle     Blood Culture Staphylococcus epidermidis     Isolated from Aerobic and Anaerobic Bottles     Gram Stain Aerobic Bottle Gram positive cocci in groups      Anaerobic Bottle Gram positive cocci in groups    Susceptibility      Staphylococcus aureus, MRSA      NAIMA      Daptomycin Susceptible (C)  [1]       Gentamicin Susceptible      Oxacillin Resistant     Rifampin Susceptible      Vancomycin Susceptible                   [1]  Appended report. These results have been appended to a previously final verified report.             Susceptibility      Staphylococcus epidermidis      NAIMA      Oxacillin Resistant     Vancomycin Susceptible                       Susceptibility Comments     Staphylococcus aureus, MRSA    Dapto requested by Dr. Pete 7/30  This isolate does not demonstrate inducible clindamycin resistance in vitro.      Staphylococcus epidermidis    This isolate is presumed to be clindamycin resistant based on detection of inducible clindamycin resistance.  Clindamycin may still be effective in some patients.             Blood Culture - Blood, Arm, Right [[id number]]  (Abnormal) Collected: 07/26/22 2300    Lab Status: Final result Specimen: Blood from Arm, Right Updated: 07/30/22 0623     Blood Culture Staphylococcus epidermidis     Isolated from Aerobic and Anaerobic Bottles     Gram Stain Anaerobic Bottle Gram positive cocci in groups      Aerobic Bottle Gram positive cocci in groups    Narrative:      Refer to previous blood culture collected on 7/26/2022 2300 for MICs    COVID-19 and FLU A/B PCR - Swab, Nasopharynx [[id number]]  (Normal) Collected: 07/26/22 2300    Lab Status: Final result Specimen: Swab from Nasopharynx Updated:  07/26/22 2340     COVID19 Not Detected     Influenza A PCR Not Detected     Influenza B PCR Not Detected    Narrative:      Fact sheet for providers: https://www.fda.gov/media/693677/download    Fact sheet for patients: https://www.fda.gov/media/433308/download    Test performed by PCR.    Blood Culture ID, PCR - Blood, Arm, Left [[id number]]  (Abnormal) Collected: 07/26/22 2300    Lab Status: Final result Specimen: Blood from Arm, Left Updated: 07/27/22 1811     BCID, PCR Staph aureus. mecA/C and MREJ (methicillin resistance gene) detected. Identification by BCID2 PCR.     BCID, PCR 2 Staph spp, not aureus or lugdunesis. Identification by BCID2 PCR.    Narrative:      Infectious disease consultation is highly recommended to rule out distant foci of infection.                Radiology:  CT Abdomen Pelvis Without Contrast    Result Date: 7/26/2022  1. Asymmetric subcutaneous edema involving the right chest and right lateral abdominal wall. This could be related to chronic patient positioning or soft tissue contusion. Correlate clinically for bruising along the right side. 2. Cardiomegaly with small right-sided pleural effusion. 3. Small volume ascites within the abdomen and pelvis, similar to the prior CT from 07/13/2022. 4. No CT findings of solid organ injury within the abdomen or pelvis within the limitations of noncontrast technique.    This report was finalized on 7/26/2022 10:46 PM by Cody Diaz MD.      XR Elbow 2 View Right    Result Date: 7/26/2022  1. No acute osseous abnormality of the right elbow. 2. Soft tissue swelling surrounding the elbow.  This report was finalized on 7/26/2022 10:39 PM by Cody Diaz MD.      XR Knee 1 or 2 View Right    Result Date: 7/26/2022  1. No acute fracture or dislocation. 2. Tricompartmental degenerative joint disease most notable within the patellofemoral and lateral compartments of the knee. 3. Prepatellar soft tissue swelling which could relate to hematoma or  prepatellar bursitis. Probable small suprapatellar joint effusion is also present.  This report was finalized on 7/26/2022 11:01 PM by Cody Diaz MD.      CT Head Without Contrast    Result Date: 7/26/2022  1. No acute intracranial abnormality. Specifically, no evidence of hemorrhage, mass effect or midline shift 2. Mild air-fluid level within the right maxillary sinus which can be seen with acute or chronic sinus disease.  This report was finalized on 7/26/2022 10:31 PM by Cody Diaz MD.      CT Lumbar Spine Without Contrast    Result Date: 7/26/2022  1. No evidence of acute fracture. 2. Grade 1 anterolisthesis of L4 on L5 secondary to degenerative facet arthropathy. There is likely at least moderate spinal canal stenosis secondary to disc and facet arthropathy. 3. Severe degenerative disc height loss at L2-L3 with endplate osseous spurring and likely mild spinal canal stenosis.  This report was finalized on 7/26/2022 10:55 PM by Cody Diaz MD.      XR Chest 1 View    Result Date: 7/26/2022  1.  Cardiomegaly with small right-sided pleural effusion. 2.  No evidence of rib fracture or pneumothorax.  This report was finalized on 7/26/2022 10:56 PM by Cody Diaz MD.      MRI Knee Right Without Contrast    Result Date: 8/1/2022   No acute osseous findings.  There is a moderate knee joint effusion which is nonspecific. No obvious destructive joint space loss, discrete bony erosion, or subchondral marrow edema to suggest septic joint otherwise, however clinical correlation is required and consider joint fluid sampling as clinically indicated.  Diffuse circumferential soft tissue swelling/subcutaneous edema about the knee, including isointense prepatellar collection which may reflect posttraumatic versus infectious bursitis versus hematoma.  Moderate to severe tricompartmental osteoarthritic change. Complex degenerative tearing of the menisci.  Indistinctness of the anterior cruciate ligament,  difficult to distinguish whether this reflects poor visualization from motion degradation artifact versus mucoid degeneration or chronic tear.  This report was finalized on 8/1/2022 8:31 AM by Kings Baez MD.      Duplex Renal Artery - Bilateral Complete CAR    Result Date: 8/1/2022   1. Limited study due to the patient's body habitus and lack of cooperation. 2. Elevated resistive indices which may reflect underlying intrinsic renal disease. 3. No evidence of obstructive uropathy or significant renal artery stenosis.  This report was finalized on 8/1/2022 4:20 PM by Trey Saba MD.      CT Maxillofacial Without Contrast    Result Date: 7/26/2022  1. Motion limited examination. No evidence of acute displaced facial bone fracture. 2. Right-sided periorbital soft tissue swelling and right-sided cheek swelling. No evidence of retrobulbar hematoma or orbital injury. 3. Mild air-fluid level within the dependent portion of the right maxillary sinus.  This report was finalized on 7/26/2022 10:37 PM by Cody Diaz MD.      XR Hip With or Without Pelvis 2 - 3 View Right    Result Date: 7/26/2022  1. No acute osseous abnormality of the pelvis or right hip. 2. Bilateral hip degenerative joint disease.  This report was finalized on 7/26/2022 10:59 PM by Cody Diaz MD.        Impression:   1. 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  The fact that she has 2 positive blood cultures for staph epidermis with MRSA also one of the sets suggest that the isolates all skin contaminants.  In addition, follow-up blood cultures have been negative.  She does not have any focal evidence of MRSA infection.  She is now stable off of intravenous antibiotic  2. Right prepatellar hematoma/right knee pain-her routine x-ray was negative for fracture.  MRI scan does not reveal a fracture or tendon rupture.  3. Recent onset of left pretibial ulcer  4. H/o right great  toe osteomyelitis/TMA 4/2021, healing with residual drainage on 5/2022. On chronic Augmentin oral suppressive therapy for about a year now.  H/o MSSA.  5. Hypothermia, improved  6. Acute on chronic kidney disease Stage III, ATN vs rhabdomyolysis  7. Rhadomyolysis, after fall and lying on right side.  8. Right-sided edema/anasarca  9. Elevated LFTs/bilirubin related to above.   10. Chronic BLE edema/RLE lymphedema  11. Type 2 diabetes mellitus, poorly controlled  12. Essential hypertension  13. Nonischemic cardiomyopathy with EF 4-45%  14. Chronic diastolic/systolic mixed CHF  15. Acoustic neuroma  16. Cephelexin, erythromycin, and sulfa intolerances (GI intolerance).      PLAN/RECOMMENDATIONS:   1.  Continue off of intravenous antibiotic therapy  2.  Augmentin 875 mg p.o. daily for chronic suppression of right foot infection  3.  Mobilize         Stef Pete MD  8/2/2022  20:09 EDT

## 2022-08-03 NOTE — NURSING NOTE
Daily Low Malachi <=14   Malachi score: 13  No new concerns noted per staff. Interventions in place and documented per protocol. Apply barrier cream every shift/PRN. Keep patient dry and turn q2h. Elevate and offload heels.  Contact [facility] for any concerns.  Will order Low Air Loss specialty bed

## 2022-08-03 NOTE — PLAN OF CARE
Goal Outcome Evaluation:  Plan of Care Reviewed With: patient      Pt. Rested most of night.  C/O spasm in left upper leg' relieved with assisted ROM and robaxin.  VSS will continue to monitor

## 2022-08-03 NOTE — PROGRESS NOTES
INFECTIOUS DISEASE  Progress Note    Susan Anderson  1940  [id number]    Admission Date: 7/26/2022      Requesting Provider: Brigid Seo MD  Evaluating Physician: Stef Pete MD    Reason for Consultation: BCID positive for MRSA, recs on abx- CKD currently on vanc    History of present illness:    7/29/22: Patient is a 82 y.o. female with h/o T2DM, CHF, CKD Stage III, HTN, chronic BLE edema, chronic right lymphedema, ELIUD/nocturnal 2L O2 NC, acoustic neuroma, CAD, T2DM, gout, HLD, asthma, PVD/right great toe TMA 4/2021/now on oral Augmentin therapy for 6 to 12 weeks from 5/19/22, cervical cancer/hysterectomy, and chronic mixed diastolic/systolic CHF, and NICM with EF 40-45% who we were asked to see for MRSA bacteremia.  The patient presented to [facility] ED on 7/26 after falling at home and pinned between the toilet and her wheelchair.  She lives alone and is unsure what happened, but states that she did not lose consciousness.  She had episode of severe diarrhea before and after falling.  Her daughter found her on the floor and called EMS.  She developed a spasm in her left lower back radiating to her hip and LLE making it difficult to get her up off the floor.  He also scraped her right knee.  Her bumex was recently doubled for 3 days for BLE edema.  She is not very mobile or independent requiring wheelchair for mobilization and requires assistance for showering.  She denies fever, chills, shortness of breath, nausea, vomiting, diarrhea, or dysuria.  She has had some nasal congestion.  On arrival to ED, her Tlow was 93.9 and her HR was bradycardic.  Admitting labs were A1C 9.0, WBC 6300 with 81% segs/2% bands, , creatinine 1.8 (baseline around 1.2-1.4), PCT 0.09, lactic acid 1.1, and UA WBC 0-2.  Blood cultures are positive in 2 of 2 sets (4 of 4 bottles) with GPC and BCID positive for MRSA and CoNS.  A COVID-19/Flu PCR was negative.  Imaging showed no evidence of acute fractures.  She had some  swelling of right side of periorbital/cheek soft tissue, chronic right maxillary sinusitis, mild pulmonary edema, subcutaneous edema of right chest and lateral abdominal wall with small volume ascites similar to 7/13/22, and prepatellar soft tissue swelling c/w hematoma or bursitis.  A CT scan of head showed no bleeds. She is currently on Vancomycin.  ID was asked to evaluate and manage her antibiotic therapy.    Her daughter indicates that she was lying on the floor at home for least 10 hours, unable to arise.  Her usual caretaker did not come to the house that day.    7/30/22: She has remained afebrile. Repeat blood cultures from 7/29 are pending.  Right prepatellar aspirate Gram stain revealed rare white blood cells with no organisms seen. Blood cultures from 726 have grown Staph epidermidis in both sets and MRSA in 1 set. Echocardiogram yesterday revealed mild mitral regurgitation.  She continues to complain of severe right knee pain.  Orthopedics has ordered an MRI scan.    8/1/22: She has remained afebrile. Follow-up blood cultures from 7/29 at been negative.  Right prepatellar aspirate culture is negative so far.  MRI scan of the right knee Revealed a moderate knee effusion which was nonspecific.  She had diffuse circumferential soft tissue swelling with isointense prepatellar collection consistent with hematoma versus infectious bursitis versus posttraumatic.  She was also noted to have complex degenerative tearing of the menisci.  She had an indistinct anterior cruciate ligament. White blood cell count today is 5.8.  Creatinine is 1.43.  She feels better today.  She has some improvement in her right knee pain.     8/2/22: She has remained afebrile.  She still complains of right knee pain.  She was able to get out of bed with assistance yesterday. Her creatinine today is 1.34.  Her white blood cell count is 4.7. Overall, she feels somewhat better.    8/3/22: She has remained afebrile.  She is on oral  Augmentin. She denies nausea or vomiting.  She has some decreased right knee pain.        Past Medical History:   Diagnosis Date   • Arthritis    • Asthma 6/4 - double pneumonia    Currently on inhaler and nebulizer   • Cancer (HCC)     cervical cancer, skin cancer   • CHF (congestive heart failure) ([facility]) June 4, 2021   • Chronic kidney disease Related to diabetes   • Coronary artery disease 6/4/2021 DX for hear failure   • Diabetes mellitus (HCC) 30 years    Seeing Dr. Lam 1st time Aug 19   • Gout    • Hx of colonoscopy    • Hyperlipidemia Reference current labs x 2-3yrs approx   • Hypertension 30 years   • Migraine    • Mitral valve disease    • Mitral valve disease    • Osteomyelitis ([facility])    • Peripheral neuropathy    • Sleep apnea    • Type 2 diabetes mellitus ([facility])     30 years       Past Surgical History:   Procedure Laterality Date   • ABDOMINAL HYSTERECTOMY W/SALPINGECTOMY     • AMPUTATION  Right great toe, 1st 1/3 metatarsal -[place] 4/14/21   • AORTAGRAM N/A 4/9/2021    Procedure: AORTAGRAM WITH OR WITHOUT RUNOFFS, WITH Co2;  Surgeon: Trey Forrest MD;  Location:  [facility];  Service: Vascular;  Laterality: N/A;   • APPENDECTOMY     • BRAIN TUMOR EXCISION      laser surgery    • CARDIAC CATHETERIZATION N/A 6/21/2021    Procedure: LEFT HEART CATH;  Surgeon: Anjum Ceballos MD;  Location:  [facility] CATH INVASIVE LOCATION;  Service: Cardiology;  Laterality: N/A;   • CATARACT EXTRACTION W/ INTRAOCULAR LENS  IMPLANT, BILATERAL     • CHOLECYSTECTOMY     • EYE SURGERY     • HYSTERECTOMY     • TOE SURGERY     • TRANS METATARSAL AMPUTATION Right 4/14/2021    Procedure: AMPUTATION TRANS METATARSAL RIGHT GREAT TOE;  Surgeon: Valdez Finney MD;  Location:  [facility] OR;  Service: Vascular;  Laterality: Right;       Family History   Problem Relation Age of Onset   • Diabetes Mother         Type II   • Heart disease Father    • Heart attack Father    • Coronary artery disease Father    • Diabetes Father         Type II    • Diabetes Sister    • Diabetes Maternal Grandmother    • Diabetes Maternal Grandfather    • Diabetes Paternal Grandmother    • Diabetes Paternal Grandfather        Social History     Socioeconomic History   • Marital status:    • Number of children: 1   Tobacco Use   • Smoking status: Never Smoker   • Smokeless tobacco: Never Used   Vaping Use   • Vaping Use: Never used   Substance and Sexual Activity   • Alcohol use: Yes     Comment: Social drinking when not recovering from hospitalization   • Drug use: Never   • Sexual activity: Not Currently     Birth control/protection: None       Allergies   Allergen Reactions   • Cephalexin Nausea Only   • Erythromycin Base Nausea Only   • Melatonin Other (See Comments)     Nightmares   • Oxycodone Nausea Only   • Sulfa Antibiotics Nausea Only         Medication:    Current Facility-Administered Medications:   •  acetaminophen (TYLENOL) tablet 650 mg, 650 mg, Oral, Q4H PRN, 650 mg at 07/31/22 1243 **OR** acetaminophen (TYLENOL) 160 MG/5ML solution 650 mg, 650 mg, Oral, Q4H PRN **OR** acetaminophen (TYLENOL) suppository 650 mg, 650 mg, Rectal, Q4H PRN, Breana, Chey G, DO  •  allopurinol (ZYLOPRIM) tablet 300 mg, 300 mg, Oral, Daily, Breana, Chey G, DO, 300 mg at 08/02/22 0821  •  amLODIPine (NORVASC) tablet 5 mg, 5 mg, Oral, Daily, Breana, Chey G, DO, 5 mg at 08/02/22 0821  •  amoxicillin-clavulanate (AUGMENTIN) 875-125 MG per tablet 1 tablet, 1 tablet, Oral, Daily, Stef Pete MD, 1 tablet at 08/02/22 0821  •  aspirin EC tablet 81 mg, 81 mg, Oral, Daily, Breana, Chey G, DO, 81 mg at 08/02/22 0821  •  atorvastatin (LIPITOR) tablet 80 mg, 80 mg, Oral, Nightly, Anna Crystal PA-C, 80 mg at 08/02/22 2020  •  bisacodyl (DULCOLAX) EC tablet 10 mg, 10 mg, Oral, Daily PRN, Anna Crystal PA-C, 10 mg at 08/02/22 0821  •  bisacodyl (DULCOLAX) suppository 10 mg, 10 mg, Rectal, Daily PRN, Anna Crystal PA-C  •  bumetanide (BUMEX) tablet 1 mg,  1 mg, Oral, Daily, Anjum Ceballos MD, 1 mg at 08/02/22 0823  •  castor oil-balsam peru (VENELEX) ointment 1 application, 1 application, Topical, Q12H, Brigid Seo MD, 1 application at 08/02/22 2021  •  dextrose (D50W) (25 g/50 mL) IV injection 25 g, 25 g, Intravenous, Q15 Min PRN, BreanaMiniChey G, DO  •  dextrose (GLUTOSE) oral gel 15 g, 15 g, Oral, Q15 Min PRN, BreanaMiniChey G, DO  •  Diclofenac Sodium (VOLTAREN) 1 % gel 2 g, 2 g, Topical, 4x Daily, Brigid Seo MD, 2 g at 08/02/22 2021  •  glucagon (human recombinant) (GLUCAGEN DIAGNOSTIC) injection 1 mg, 1 mg, Intramuscular, Q15 Min PRN, BreanaMiniChey G, DO  •  guaifenesin-dextromethorphan (MUCINEX DM) tablet 1 tablet, 1 tablet, Oral, BID PRN, Brigid Seo MD, 1 tablet at 08/02/22 0821  •  heparin (porcine) 5000 UNIT/ML injection 5,000 Units, 5,000 Units, Subcutaneous, Q12H, Chey Toussaint, DO, 5,000 Units at 08/02/22 2019  •  hydrALAZINE (APRESOLINE) tablet 25 mg, 25 mg, Oral, Q8H, Ruddy Guerrier, DO, 25 mg at 08/03/22 0520  •  insulin detemir (LEVEMIR) injection 22 Units, 22 Units, Subcutaneous, Daily, Anna Crystal PA-C  •  Insulin Lispro (humaLOG) injection 0-7 Units, 0-7 Units, Subcutaneous, TID AC, Anna Crystal PA-C, 3 Units at 08/02/22 1259  •  Insulin Lispro (humaLOG) injection 10 Units, 10 Units, Subcutaneous, TID With Meals, Anna Crystal PA-C  •  isosorbide dinitrate (ISORDIL) tablet 20 mg, 20 mg, Oral, BID, Chey Toussaint, DO, 20 mg at 08/02/22 2021  •  lidocaine (LIDODERM) 5 % 1 patch, 1 patch, Transdermal, Q24H, Chey Toussaint DO, 1 patch at 08/02/22 0823  •  magnesium hydroxide (MILK OF MAGNESIA) suspension 10 mL, 10 mL, Oral, Daily PRN, Anna Crystal PA-C, 10 mL at 08/02/22 0820  •  methocarbamol (ROBAXIN) tablet 750 mg, 750 mg, Oral, Q6H PRN, Ruddy Guerrier DO, 750 mg at 08/03/22 0000  •  ondansetron (ZOFRAN) tablet 4 mg, 4 mg, Oral, Q6H PRN **OR** ondansetron (ZOFRAN) injection 4 mg, 4 mg, Intravenous, Q6H  PRN, Breana, Chey G, DO, 4 mg at 22 0106  •  polyethylene glycol (MIRALAX) packet 17 g, 17 g, Oral, Daily, Chey, Ruddy, DO, 17 g at 22 0820  •  pregabalin (LYRICA) capsule 50 mg, 50 mg, Oral, Q12H, Breana, Chey G, DO, 50 mg at 22 2019  •  sennosides-docusate (PERICOLACE) 8.6-50 MG per tablet 1 tablet, 1 tablet, Oral, BID, Chey, Ruddy, DO, 1 tablet at 22  •  [COMPLETED] Insert peripheral IV, , , Once **AND** sodium chloride 0.9 % flush 10 mL, 10 mL, Intravenous, PRN, Breana, Chey G, DO  •  sodium chloride 0.9 % flush 10 mL, 10 mL, Intravenous, Q12H, Breana, Chey G, DO, 10 mL at 22  •  sodium chloride 0.9 % flush 10 mL, 10 mL, Intravenous, PRN, Breana, Chey G, DO  •  vitamin B-12 (CYANOCOBALAMIN) tablet 100 mcg, 100 mcg, Oral, Daily, Breana, Chey G, DO, 100 mcg at 22 0821    Antibiotics:  Anti-Infectives (From admission, onward)    Ordered     Dose/Rate Route Frequency Start Stop    22 1229  amoxicillin-clavulanate (AUGMENTIN) 875-125 MG per tablet 1 tablet        Ordering Provider: Stef Pete MD    1 tablet Oral Daily 22 1315 22 0859    22 0859  vancomycin (VANCOCIN) 1000 mg/200 mL dextrose 5% IVPB        Ordering Provider: Tom Phelan PharmD    10 mg/kg × 104 kg  over 60 Minutes Intravenous Once 22 1000 22 1028    22 1824  vancomycin 1750 mg/500 mL 0.9% NS IVPB ([facility])        Ordering Provider: Adalid Mcrae RPH    20 mg/kg × 90.7 kg  over 105 Minutes Intravenous Once 22 1915 22 2204            Review of Systems:  See HPI    Physical Exam:   Vital Signs  Temp (24hrs), Av.1 °F (36.7 °C), Min:97.8 °F (36.6 °C), Max:98.8 °F (37.1 °C)    Temp  Min: 97.8 °F (36.6 °C)  Max: 98.8 °F (37.1 °C)  BP  Min: 140/61  Max: 163/76  Pulse  Min: 63  Max: 73  Resp  Min: 16  Max: 18  SpO2  Min: 90 %  Max: 96 %    GENERAL: She is drowsy but in no acute distress  HEENT: Normocephalic, atraumatic.  PERRL. EOMI. No  conjunctival injection. No icterus. Oropharynx clear without evidence of thrush or exudate.   NECK: Supple   HEART: RRR; No murmur, rubs, gallops.   LUNGS: Clear to auscultation bilaterally without wheezing, rales, rhonchi. Normal respiratory effort. Nonlabored. No dullness.  ABDOMEN: Soft, nontender, nondistended. No rebound or guarding. NO mass or HSM.  EXT:  No cyanosis, clubbing or edema. No cord.  :  Without Bui catheter.  MSK: Her right pretibial wound is 3-4 cm in diameter with some residual slough but no cellulitis.  There is decreasedprepatellar swelling secondary to hematoma.  SKIN: Warm and dry without cutaneous eruptions on Inspection/palpation.    NEURO: Drowsy but arousable.  She moves all of her extremities.    Laboratory Data    Results from last 7 days   Lab Units 08/02/22  0701 08/01/22  0708 07/31/22  0525   WBC 10*3/mm3 4.72 5.77 5.32   HEMOGLOBIN g/dL 9.7* 9.5* 9.1*   HEMATOCRIT % 30.7* 29.5* 27.1*   PLATELETS 10*3/mm3 147 188 184     Results from last 7 days   Lab Units 08/02/22  0701   SODIUM mmol/L 143   POTASSIUM mmol/L 4.6   CHLORIDE mmol/L 104   CO2 mmol/L 31.0*   BUN mg/dL 65*   CREATININE mg/dL 1.34*   GLUCOSE mg/dL 167*   CALCIUM mg/dL 9.2     Results from last 7 days   Lab Units 07/31/22  0525   ALK PHOS U/L 197*   BILIRUBIN mg/dL 0.7   ALT (SGPT) U/L 24   AST (SGOT) U/L 29                 Results from last 7 days   Lab Units 07/31/22  1647 07/29/22  1456   CK TOTAL U/L 344* 334*     Results from last 7 days   Lab Units 07/28/22  0508   VANCOMYCIN RM mcg/mL 15.40     Estimated Creatinine Clearance: 35.1 mL/min (A) (by C-G formula based on SCr of 1.34 mg/dL (H)).      Microbiology:  Microbiology Results (last 10 days)     Procedure Component Value - Date/Time    Eosinophil Smear - Urine, Urine, Clean Catch [[id number]]  (Normal) Collected: 07/31/22 1503    Lab Status: Final result Specimen: Urine, Clean Catch Updated: 07/31/22 1627     Eosinophil Smear 0 % EOS/100 Cells      Narrative:      No eosinophil seen    Blood Culture - Blood, Arm, Right [[id number]]  (Normal) Collected: 07/29/22 1456    Lab Status: Preliminary result Specimen: Blood from Arm, Right Updated: 08/02/22 1517     Blood Culture No growth at 4 days    Blood Culture - Blood, Hand, Left [[id number]]  (Normal) Collected: 07/29/22 1456    Lab Status: Preliminary result Specimen: Blood from Hand, Left Updated: 08/02/22 1517     Blood Culture No growth at 4 days    Body Fluid Culture - Body Fluid, Knee, Right [[id number]] Collected: 07/29/22 1400    Lab Status: Preliminary result Specimen: Body Fluid from Knee, Right Updated: 08/02/22 0804     Body Fluid Culture No growth at 4 days     Gram Stain Rare (1+) WBCs seen      No organisms seen    COVID PRE-OP / PRE-PROCEDURE SCREENING ORDER (NO ISOLATION) - Swab, Nasopharynx [[id number]]  (Normal) Collected: 07/26/22 2300    Lab Status: Final result Specimen: Swab from Nasopharynx Updated: 07/26/22 2340    Narrative:      The following orders were created for panel order COVID PRE-OP / PRE-PROCEDURE SCREENING ORDER (NO ISOLATION) - Swab, Nasopharynx.  Procedure                               Abnormality         Status                     ---------                               -----------         ------                     COVID-19 and FLU A/B PCR...[[id number]]  Normal              Final result                 Please view results for these tests on the individual orders.    Blood Culture - Blood, Arm, Left [[id number]]  (Abnormal)  (Susceptibility) Collected: 07/26/22 2300    Lab Status: Edited Result - FINAL Specimen: Blood from Arm, Left Updated: 07/30/22 0819     Blood Culture Staphylococcus aureus, MRSA     Comment: Infectious disease consultation is highly recommended to rule out distant foci of infection.  Methicillin resistant Staphylococcus aureus, Patient may be an isolation risk.        Isolated from Aerobic Bottle     Blood Culture Staphylococcus epidermidis     Isolated  from Aerobic and Anaerobic Bottles     Gram Stain Aerobic Bottle Gram positive cocci in groups      Anaerobic Bottle Gram positive cocci in groups    Susceptibility      Staphylococcus aureus, MRSA      NAIMA      Daptomycin Susceptible (C)  [1]       Gentamicin Susceptible      Oxacillin Resistant     Rifampin Susceptible      Vancomycin Susceptible                   [1]  Appended report. These results have been appended to a previously final verified report.             Susceptibility      Staphylococcus epidermidis      NAIMA      Oxacillin Resistant     Vancomycin Susceptible                       Susceptibility Comments     Staphylococcus aureus, MRSA    Dapto requested by Dr. Pete 7/30  This isolate does not demonstrate inducible clindamycin resistance in vitro.      Staphylococcus epidermidis    This isolate is presumed to be clindamycin resistant based on detection of inducible clindamycin resistance.  Clindamycin may still be effective in some patients.             Blood Culture - Blood, Arm, Right [[id number]]  (Abnormal) Collected: 07/26/22 2300    Lab Status: Final result Specimen: Blood from Arm, Right Updated: 07/30/22 0623     Blood Culture Staphylococcus epidermidis     Isolated from Aerobic and Anaerobic Bottles     Gram Stain Anaerobic Bottle Gram positive cocci in groups      Aerobic Bottle Gram positive cocci in groups    Narrative:      Refer to previous blood culture collected on 7/26/2022 2300 for MICs    COVID-19 and FLU A/B PCR - Swab, Nasopharynx [[id number]]  (Normal) Collected: 07/26/22 2300    Lab Status: Final result Specimen: Swab from Nasopharynx Updated: 07/26/22 2340     COVID19 Not Detected     Influenza A PCR Not Detected     Influenza B PCR Not Detected    Narrative:      Fact sheet for providers: https://www.fda.gov/media/786222/download    Fact sheet for patients: https://www.fda.gov/media/168280/download    Test performed by PCR.    Blood Culture ID, PCR - Blood, Arm, Left  [[id number]]  (Abnormal) Collected: 07/26/22 2300    Lab Status: Final result Specimen: Blood from Arm, Left Updated: 07/27/22 1811     BCID, PCR Staph aureus. mecA/C and MREJ (methicillin resistance gene) detected. Identification by BCID2 PCR.     BCID, PCR 2 Staph spp, not aureus or lugdunesis. Identification by BCID2 PCR.    Narrative:      Infectious disease consultation is highly recommended to rule out distant foci of infection.                Radiology:  CT Abdomen Pelvis Without Contrast    Result Date: 7/26/2022  1. Asymmetric subcutaneous edema involving the right chest and right lateral abdominal wall. This could be related to chronic patient positioning or soft tissue contusion. Correlate clinically for bruising along the right side. 2. Cardiomegaly with small right-sided pleural effusion. 3. Small volume ascites within the abdomen and pelvis, similar to the prior CT from 07/13/2022. 4. No CT findings of solid organ injury within the abdomen or pelvis within the limitations of noncontrast technique.    This report was finalized on 7/26/2022 10:46 PM by Cody Diaz MD.      XR Elbow 2 View Right    Result Date: 7/26/2022  1. No acute osseous abnormality of the right elbow. 2. Soft tissue swelling surrounding the elbow.  This report was finalized on 7/26/2022 10:39 PM by Cody Diaz MD.      XR Knee 1 or 2 View Right    Result Date: 7/26/2022  1. No acute fracture or dislocation. 2. Tricompartmental degenerative joint disease most notable within the patellofemoral and lateral compartments of the knee. 3. Prepatellar soft tissue swelling which could relate to hematoma or prepatellar bursitis. Probable small suprapatellar joint effusion is also present.  This report was finalized on 7/26/2022 11:01 PM by Cody Diaz MD.      CT Head Without Contrast    Result Date: 7/26/2022  1. No acute intracranial abnormality. Specifically, no evidence of hemorrhage, mass effect or midline shift 2. Mild  air-fluid level within the right maxillary sinus which can be seen with acute or chronic sinus disease.  This report was finalized on 7/26/2022 10:31 PM by Cody Diaz MD.      CT Lumbar Spine Without Contrast    Result Date: 7/26/2022  1. No evidence of acute fracture. 2. Grade 1 anterolisthesis of L4 on L5 secondary to degenerative facet arthropathy. There is likely at least moderate spinal canal stenosis secondary to disc and facet arthropathy. 3. Severe degenerative disc height loss at L2-L3 with endplate osseous spurring and likely mild spinal canal stenosis.  This report was finalized on 7/26/2022 10:55 PM by Cody Diaz MD.      XR Chest 1 View    Result Date: 7/26/2022  1.  Cardiomegaly with small right-sided pleural effusion. 2.  No evidence of rib fracture or pneumothorax.  This report was finalized on 7/26/2022 10:56 PM by Cody Diaz MD.      MRI Knee Right Without Contrast    Result Date: 8/1/2022   No acute osseous findings.  There is a moderate knee joint effusion which is nonspecific. No obvious destructive joint space loss, discrete bony erosion, or subchondral marrow edema to suggest septic joint otherwise, however clinical correlation is required and consider joint fluid sampling as clinically indicated.  Diffuse circumferential soft tissue swelling/subcutaneous edema about the knee, including isointense prepatellar collection which may reflect posttraumatic versus infectious bursitis versus hematoma.  Moderate to severe tricompartmental osteoarthritic change. Complex degenerative tearing of the menisci.  Indistinctness of the anterior cruciate ligament, difficult to distinguish whether this reflects poor visualization from motion degradation artifact versus mucoid degeneration or chronic tear.  This report was finalized on 8/1/2022 8:31 AM by Kings Baez MD.      Duplex Renal Artery - Bilateral Complete CAR    Result Date: 8/1/2022   1. Limited study due to the patient's body  habitus and lack of cooperation. 2. Elevated resistive indices which may reflect underlying intrinsic renal disease. 3. No evidence of obstructive uropathy or significant renal artery stenosis.  This report was finalized on 8/1/2022 4:20 PM by Trey Saba MD.      CT Maxillofacial Without Contrast    Result Date: 7/26/2022  1. Motion limited examination. No evidence of acute displaced facial bone fracture. 2. Right-sided periorbital soft tissue swelling and right-sided cheek swelling. No evidence of retrobulbar hematoma or orbital injury. 3. Mild air-fluid level within the dependent portion of the right maxillary sinus.  This report was finalized on 7/26/2022 10:37 PM by Cody Diaz MD.      XR Hip With or Without Pelvis 2 - 3 View Right    Result Date: 7/26/2022  1. No acute osseous abnormality of the pelvis or right hip. 2. Bilateral hip degenerative joint disease.  This report was finalized on 7/26/2022 10:59 PM by Cody Diaz MD.        Impression:   1. 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  The fact that she has 2 positive blood cultures for staph epidermis with MRSA also one of the sets suggest that the isolates all skin contaminants.  In addition, follow-up blood cultures have been negative.  She does not have any focal evidence of MRSA infection.  She is now stable off of intravenous antibiotic  2. Right prepatellar hematoma/right knee pain-her routine x-ray was negative for fracture.  MRI scan does not reveal a fracture or tendon rupture.  3. Recent onset of left pretibial ulcer  4. 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 On chronic Augmentin oral suppressive therapy for about a year now.  H/o MSSA.  5. Hypothermia, improved  6. Acute on chronic kidney disease Stage III, ATN vs rhabdomyolysis  7. Rhadomyolysis, after fall and lying on right side.  8. Right-sided  edema/anasarca  9. Elevated LFTs/bilirubin related to above.   10. Chronic BLE edema/RLE lymphedema  11. Type 2 diabetes mellitus, poorly controlled  12. Essential hypertension  13. Nonischemic cardiomyopathy with EF 4-45%  14. Chronic diastolic/systolic mixed CHF  15. Acoustic neuroma  16. Cephelexin, erythromycin, and sulfa intolerances (GI intolerance).      PLAN/RECOMMENDATIONS:   1.  Continue off of intravenous antibiotic therapy  2.  Augmentin 875 mg p.o. daily for chronic suppression of right foot infection  3.  Mobilize         Stef Pete MD  8/3/2022  06:58 EDT

## 2022-08-03 NOTE — PROGRESS NOTES
"    [facility] Medicine Services  PROGRESS NOTE    Patient Name: Susan Anderson  : 1940  MRN: [id number]    Date of Admission: 2022  Primary Care Physician: Arleen Doherty MD    Subjective     CC: f/u weakness, constipation    HPI:  PA student examined patient this morning and reported patient sitting in bed, crying in pain r/t LLE and neck muscle spasms. Patient states her current muscle relaxant and pain medication are not working, and she \"can't go on like this.\"     When I met with patient, she was sitting in bed complaining of back pain. She states discomfort with her purewick and requests removal. Patient had a large BM overnight, per nursing staff.     ROS:  Gen- No fevers, chills  CV- No chest pain, palpitations  Resp- No cough, dyspnea  GI- No N/V/D, abd soft and nontender  Msk- muscle spasms LLE and neck (+)    Objective     Vital Signs:   Temp:  [97.8 °F (36.6 °C)-98.8 °F (37.1 °C)] 97.8 °F (36.6 °C)  Heart Rate:  [63-70] 67  Resp:  [18-20] 20  BP: (121-163)/(62-80) 121/80  Flow (L/min):  [2] 2     Physical Exam:  Constitutional: Chronically ill appearing, acute discomfort  HENT: NCAT, mucous membranes moist, congested  Respiratory: Crackles appreciated in lower lung fields, respiratory effort normal  Cardiovascular: RRR, no murmurs, rubs, or gallops  Gastrointestinal: Positive bowel sounds, soft, non-tender to palpation  Musculoskeletal: 1-2+ pitting BLE edema, BUE 1+ edema, abdominal anasarca  Psychiatric: Cooperative  Neurologic: Numbness BLE; Oriented x 3, moves all extremities spontaneously without focal deficits, speech clear    Results Reviewed:  LAB RESULTS:      Lab 22  0701 22  0708 22  0525 22  0743 22  1456   WBC 4.72 5.77 5.32 5.34 5.82   HEMOGLOBIN 9.7* 9.5* 9.1* 8.8* 9.6*   HEMATOCRIT 30.7* 29.5* 27.1* 27.5* 29.2*   PLATELETS 147 188 184 160 186   NEUTROS ABS  --   --   --   --  4.06   IMMATURE GRANS (ABS)  --   --   -- "   --  0.19*   LYMPHS ABS  --   --   --   --  0.86   MONOS ABS  --   --   --   --  0.61   EOS ABS  --   --   --   --  0.08   MCV 91.1 91.6 90.3 90.5 90.4         Lab 08/03/22  0854 08/02/22  0701 08/01/22  0708 07/31/22  1647 07/31/22  0525 07/30/22  0743   SODIUM 143 143 142  --  134* 134*   POTASSIUM 4.5 4.6 4.2  --  4.2 5.0   CHLORIDE 102 104 103  --  98 97*   CO2 32.0* 31.0* 32.0*  --  31.0* 25.0   ANION GAP 9.0 8.0 7.0  --  5.0 12.0   BUN 65* 65* 71*  --  79* 90*   CREATININE 1.38* 1.34* 1.43*  --  1.62* 1.93*   EGFR 38.1* 39.4* 36.5*  --  31.4* 25.4*   GLUCOSE 163* 167* 90  --  87 249*   CALCIUM 8.7 9.2 9.0  --  8.3* 8.1*   PHOSPHORUS  --   --   --  4.2  --   --          Lab 07/31/22  0525   TOTAL PROTEIN 5.1*   ALBUMIN 2.80*   GLOBULIN 2.3   ALT (SGPT) 24   AST (SGOT) 29   BILIRUBIN 0.7   ALK PHOS 197*         Lab 08/03/22  0854   PROBNP 3,452.0*         Brief Urine Lab Results  (Last result in the past 365 days)      Color   Clarity   Blood   Leuk Est   Nitrite   Protein   CREAT   Urine HCG        07/31/22 1503             27.0             Microbiology Results Abnormal     Procedure Component Value - Date/Time    Blood Culture - Blood, Arm, Right [[id number]]  (Normal) Collected: 07/29/22 1456    Lab Status: Final result Specimen: Blood from Arm, Right Updated: 08/03/22 1517     Blood Culture No growth at 5 days    Blood Culture - Blood, Hand, Left [[id number]]  (Normal) Collected: 07/29/22 1456    Lab Status: Final result Specimen: Blood from Hand, Left Updated: 08/03/22 1517     Blood Culture No growth at 5 days    Body Fluid Culture - Body Fluid, Knee, Right [[id number]] Collected: 07/29/22 1400    Lab Status: Final result Specimen: Body Fluid from Knee, Right Updated: 08/03/22 1009     Body Fluid Culture No growth at 5 days     Gram Stain Rare (1+) WBCs seen      No organisms seen    Eosinophil Smear - Urine, Urine, Clean Catch [[id number]]  (Normal) Collected: 07/31/22 1503    Lab Status: Final result  Specimen: Urine, Clean Catch Updated: 07/31/22 1627     Eosinophil Smear 0 % EOS/100 Cells     Narrative:      No eosinophil seen    COVID PRE-OP / PRE-PROCEDURE SCREENING ORDER (NO ISOLATION) - Swab, Nasopharynx [[id number]]  (Normal) Collected: 07/26/22 2300    Lab Status: Final result Specimen: Swab from Nasopharynx Updated: 07/26/22 2340    Narrative:      The following orders were created for panel order COVID PRE-OP / PRE-PROCEDURE SCREENING ORDER (NO ISOLATION) - Swab, Nasopharynx.  Procedure                               Abnormality         Status                     ---------                               -----------         ------                     COVID-19 and FLU A/B PCR...[[id number]]  Normal              Final result                 Please view results for these tests on the individual orders.    COVID-19 and FLU A/B PCR - Swab, Nasopharynx [[id number]]  (Normal) Collected: 07/26/22 2300    Lab Status: Final result Specimen: Swab from Nasopharynx Updated: 07/26/22 2340     COVID19 Not Detected     Influenza A PCR Not Detected     Influenza B PCR Not Detected    Narrative:      Fact sheet for providers: https://www.fda.gov/media/745664/download    Fact sheet for patients: https://www.fda.gov/media/245432/download    Test performed by PCR.        XR Chest 1 View    Result Date: 8/3/2022  DATE OF EXAM: 8/3/2022 11:30 AM  PROCEDURE: XR CHEST 1 VW-  INDICATIONS: hypoxia; R53.1-Weakness; W19.XXXA-Unspecified fall, initial encounter; R74.8-Abnormal levels of other serum enzymes; R77.8-Other specified abnormalities of plasma proteins; N18.9-Chronic kidney disease, unspecified; R73.9-Hyperglycemia, unspecified  COMPARISON: 7/26/2022  TECHNIQUE: Single radiographic AP view of the chest was obtained.  FINDINGS: The heart is enlarged and there is now extensive pulmonary interstitial edema, with moderate bibasilar effusion and atelectasis. No pneumothorax is seen. Appearance is consistent with congestive heart  failure.      Impression: Congestive heart failure with moderate bibasilar effusion/atelectasis.  This report was finalized on 8/3/2022 12:17 PM by Dr. Breezy Rey MD.      Results for orders placed during the hospital encounter of 07/26/22    Adult Transthoracic Echo Complete W/ Cont if Necessary Per Protocol    Interpretation Summary  · Normal left ventricular systolic function, estimated EF 55%.  · Aortic sclerosis without aortic stenosis or regurgitation.  · Mild mitral regurgitation.    I have reviewed the medications:  Scheduled Meds:allopurinol, 300 mg, Oral, Daily  amLODIPine, 5 mg, Oral, Daily  amoxicillin-clavulanate, 1 tablet, Oral, Daily  aspirin, 81 mg, Oral, Daily  atorvastatin, 80 mg, Oral, Nightly  bumetanide, 1 mg, Oral, BID  castor oil-balsam peru, 1 application, Topical, Q12H  Diclofenac Sodium, 2 g, Topical, 4x Daily  guaiFENesin, 600 mg, Oral, Q12H  heparin (porcine), 5,000 Units, Subcutaneous, Q12H  hydrALAZINE, 25 mg, Oral, Q8H  insulin detemir, 22 Units, Subcutaneous, Daily  insulin lispro, 0-7 Units, Subcutaneous, TID AC  Insulin Lispro, 9 Units, Subcutaneous, TID With Meals  isosorbide dinitrate, 20 mg, Oral, BID  lidocaine, 1 patch, Transdermal, Q24H  polyethylene glycol, 17 g, Oral, Daily  pregabalin, 50 mg, Oral, Q12H  senna-docusate sodium, 1 tablet, Oral, BID  sodium chloride, 10 mL, Intravenous, Q12H  vitamin B-12, 100 mcg, Oral, Daily      Continuous Infusions:   PRN Meds:.•  acetaminophen **OR** [DISCONTINUED] acetaminophen **OR** [DISCONTINUED] acetaminophen  •  baclofen  •  bisacodyl  •  bisacodyl  •  dextrose  •  dextrose  •  diazePAM  •  glucagon (human recombinant)  •  magnesium hydroxide  •  ondansetron **OR** ondansetron  •  oxyCODONE  •  [COMPLETED] Insert peripheral IV **AND** sodium chloride  •  sodium chloride    Assessment & Plan     Active Hospital Problems    Diagnosis  POA   • **Weakness [R53.1]  Yes   • Anemia [D64.9]  Unknown   • Fall [W19.XXXA]  Unknown   •  Dizziness [R42]  Unknown   • Acoustic neuroma (HCC) [D33.3]  Unknown   • Elevated troponin [R77.8]  Unknown   • CHF exacerbation ([facility]) [I50.9]  Unknown   • Elevated serum creatinine [R79.89]  Unknown   • NICM (nonischemic cardiomyopathy) ([facility]) [I42.8]  Yes   • Coronary artery disease involving native coronary artery of native heart without angina pectoris [I25.10]  Yes   • Dyslipidemia [E78.5]  Yes   • Chronic combined systolic and diastolic heart failure (HCC) [I50.42]  Yes   • Mitral valve disease [I05.9]  Yes   • PVD (peripheral vascular disease) ([facility]) [I73.9]  Yes   • Essential hypertension [I10]  Yes   • CKD (chronic kidney disease), stage III ([facility]) [N18.30]  Yes      Resolved Hospital Problems   No resolved problems to display.     Brief Hospital Course to date:  Susan Anderson is a 82 y.o. female with PMH significant for HTN, HLD, CAD, non-ischemic cardiomyopathy, chronic combined systolic/diastolic CHF, CKD III, PVD, insulin-dependent DMII, ELIUD (on 2L NC QHS, chronic BLE edema, and acoustic neuroma. She was admitted to [facility] 7/26/22 for mild rhabdomyolysis after a fall at home. Also found to have a/c CHF exacerbation and LACEY.     Fall at home  Generalized weakness  - Fall at home in bathroom when she opted not to wait for her bath aide to help her  - Cardiology has evaluated and feel her falls are likely mechanical and not cardiac. May have some orthostasis. Patient does report she feels better with SBP in 150's, so allowing some permissive hypertension   - PT/OT following - referral to [facility] is pending, patient has requested private room     Rhabdomyolysis, resolved   -  on admission, s/p IV fluids     Acute on chronic combined systolic/diastolic CHF  Nonischemic cardiomyopathy   LACEY on CKD III  - Appreciate nephrology assistance  - 8/01 renal artery duplex suggestive of intrinsic disease but no significant renal artery stenosis or obstructive uropathy   - Baseline creatinine  1.3-1.5  - Creatinine 1.9 on admission, has since returned to baseline. 1.38 today   - CXR 8/03 showed congestive heart failure with moderate bibasilar effusion/atelectasis  - Continue Diuresis, discussed with nephrology - will increase Bumex to 1mg BID  - s/p IV albumin   - Fluid restriction per nephrology     Blood culture (+) MRSA, suspected contaminant   - 7/26 - 2 of 2 blood cultures (+) staph epidermis, 1 of 2 positive for MRSA  - Repeat blood cultures on 7/31 NGTD  - Appreciate ID assistance. No source of infection has been identified.  - Discussed with ID on 8/1, suspect contaminant. Antibiotics stopped    Right knee effusion   - Orthopedic surgery has evaluated  - Joint aspiration not consistent with infection. Culture NGTD  - MRI of R knee does not suggest structural injury to the knee  - WBAT. Knee immobilizer on for comfort  - Follow up with Dr. Pearce in 2-3 weeks     Low back spasms   - PRN Robaxin ineffective per patient, changed to PRN Baclofen today  - Oxycodone 2.5mg restarted   - 4mg Valium q8h PRN     Chronic anemia   - PO iron supplement     Insulin-dependent DMII   Diabetic peripheral neuropathy  - Hgb A1c 9.0%  - Continue Levemir 22 units daily, decrease Lispro to 6 units TID AC + SSI  - Continue Lyrica     History of R great toe osteomyelitis  - s/p TMA 4/2021 by Dr. Finney.   - Culture (+) MSSA  - Has been on chronic suppressive Augmentin for ~1 year  - Back on Augmentin per ID      Dizziness  Acoustic neuroma, chronic  - ?contributing to falls  - Had initial w/u for neuroma at [facility]  - Has not had recent follow up imaging, consider MRI with/without contrast if GFR continues to improve prior to DC     Essential hypertension  - Beta blocker stopped due to bradycardia  - Restarted Lipitor now that Daptomycin has been stopped  - Continue Amlodipine 5mg daily, Bumex 1mg BID, Hydralazine 25mg TID, Isordil 20mg TID,      Constipation  - Continue Miralax / Senna  - May need to escalate further but  will give her another 24-36 hours    Expected Discharge Location and Transportation: rehab via EMS or medical transport van   Expected Discharge Date: 8/5/22 or after the weekend if insurance approval still pending     DVT prophylaxis:Medical DVT prophylaxis orders are present.     AM-PAC 6 Clicks Score (PT): 14 (08/02/22 3053)    CODE STATUS:   Code Status and Medical Interventions:   Ordered at: 07/27/22 0143     Code Status (Patient has no pulse and is not breathing):    CPR (Attempt to Resuscitate)     Medical Interventions (Patient has pulse or is breathing):    Full Support     Anna Crystal PA-C  08/03/22

## 2022-08-03 NOTE — PROGRESS NOTES
"   LOS: 7 days    Patient Care Team:  Arleen Doherty MD as PCP - General (Internal Medicine)  Flory Sauer APRN (Nurse Practitioner)  Janeth Lam MD as Consulting Physician (Endocrinology)  Anjum Ceballos MD as Consulting Physician (Cardiology)    Chief Complaint:  SOA    Subjective     Interval History:     No acute events overnight. No new complaints     Review of Systems:   No CP or SOA , fever, chills, rigors, rash, N/V, Constipation.       Objective     Vital Sign Min/Max for last 24 hours  Temp  Min: 97.8 °F (36.6 °C)  Max: 98.8 °F (37.1 °C)   BP  Min: 121/80  Max: 163/76   Pulse  Min: 63  Max: 70   Resp  Min: 18  Max: 20   SpO2  Min: 90 %  Max: 96 %   Flow (L/min)  Min: 2  Max: 2   Weight  Min: 96 kg (211 lb 9.6 oz)  Max: 96 kg (211 lb 9.6 oz)     Flowsheet Rows    Flowsheet Row First Filed Value   Admission Height 160 cm (63\") Documented at 07/26/2022 2039   Admission Weight 90.7 kg (200 lb) Documented at 07/26/2022 2039          I/O this shift:  In: 237 [P.O.:237]  Out: 150 [Urine:150]  I/O last 3 completed shifts:  In: 767 [P.O.:717; IV Piggyback:50]  Out: 300 [Urine:300]    Physical Exam:    Gen: Alert, NAD   HENT: NC, AT, EOMI   NECK: Supple, no JVD, Trachea midline   LUNGS: CTA bilaterally, non labored respirtation   CVS: S1/S2 audible, RRR, no murmur   Abd: Soft, NT, ND, BS+   Ext: + pedal edema, no cyanosis   CNS: Alert, No focal deficit noted grossly  Psy: Cooperative  Skin: Warm, dry and intact      WBC WBC   Date Value Ref Range Status   08/02/2022 4.72 3.40 - 10.80 10*3/mm3 Final   08/01/2022 5.77 3.40 - 10.80 10*3/mm3 Final      HGB Hemoglobin   Date Value Ref Range Status   08/02/2022 9.7 (L) 12.0 - 15.9 g/dL Final   08/01/2022 9.5 (L) 12.0 - 15.9 g/dL Final      HCT Hematocrit   Date Value Ref Range Status   08/02/2022 30.7 (L) 34.0 - 46.6 % Final   08/01/2022 29.5 (L) 34.0 - 46.6 % Final      Platlets No results found for: LABPLAT   MCV MCV   Date Value Ref Range Status "   08/02/2022 91.1 79.0 - 97.0 fL Final   08/01/2022 91.6 79.0 - 97.0 fL Final          Sodium Sodium   Date Value Ref Range Status   08/03/2022 143 136 - 145 mmol/L Final   08/02/2022 143 136 - 145 mmol/L Final   08/01/2022 142 136 - 145 mmol/L Final      Potassium Potassium   Date Value Ref Range Status   08/03/2022 4.5 3.5 - 5.2 mmol/L Final   08/02/2022 4.6 3.5 - 5.2 mmol/L Final     Comment:     Slight hemolysis detected by analyzer. Results may be affected.   08/01/2022 4.2 3.5 - 5.2 mmol/L Final      Chloride Chloride   Date Value Ref Range Status   08/03/2022 102 98 - 107 mmol/L Final   08/02/2022 104 98 - 107 mmol/L Final   08/01/2022 103 98 - 107 mmol/L Final      CO2 CO2   Date Value Ref Range Status   08/03/2022 32.0 (H) 22.0 - 29.0 mmol/L Final   08/02/2022 31.0 (H) 22.0 - 29.0 mmol/L Final   08/01/2022 32.0 (H) 22.0 - 29.0 mmol/L Final      BUN BUN   Date Value Ref Range Status   08/03/2022 65 (H) 8 - 23 mg/dL Final   08/02/2022 65 (H) 8 - 23 mg/dL Final   08/01/2022 71 (H) 8 - 23 mg/dL Final      Creatinine Creatinine   Date Value Ref Range Status   08/03/2022 1.38 (H) 0.57 - 1.00 mg/dL Final   08/02/2022 1.34 (H) 0.57 - 1.00 mg/dL Final   08/01/2022 1.43 (H) 0.57 - 1.00 mg/dL Final      Calcium Calcium   Date Value Ref Range Status   08/03/2022 8.7 8.6 - 10.5 mg/dL Final   08/02/2022 9.2 8.6 - 10.5 mg/dL Final   08/01/2022 9.0 8.6 - 10.5 mg/dL Final      PO4 No results found for: CAPO4   Albumin No results found for: ALBUMIN   Magnesium No results found for: MG   Uric Acid Uric Acid   Date Value Ref Range Status   07/31/2022 4.2 2.4 - 5.7 mg/dL Final     Comment:     Falsely depressed results may occur on samples drawn from patients receiving N-Acetylcysteine (NAC) or Metamizole.           Results Review:     I reviewed the patient's new clinical results.    allopurinol, 300 mg, Oral, Daily  amLODIPine, 5 mg, Oral, Daily  amoxicillin-clavulanate, 1 tablet, Oral, Daily  aspirin, 81 mg, Oral,  Daily  atorvastatin, 80 mg, Oral, Nightly  bumetanide, 1 mg, Oral, Daily  castor oil-balsam peru, 1 application, Topical, Q12H  Diclofenac Sodium, 2 g, Topical, 4x Daily  guaiFENesin, 600 mg, Oral, Q12H  heparin (porcine), 5,000 Units, Subcutaneous, Q12H  hydrALAZINE, 25 mg, Oral, Q8H  insulin detemir, 22 Units, Subcutaneous, Daily  insulin lispro, 0-7 Units, Subcutaneous, TID AC  Insulin Lispro, 9 Units, Subcutaneous, TID With Meals  isosorbide dinitrate, 20 mg, Oral, BID  lidocaine, 1 patch, Transdermal, Q24H  polyethylene glycol, 17 g, Oral, Daily  pregabalin, 50 mg, Oral, Q12H  senna-docusate sodium, 1 tablet, Oral, BID  sodium chloride, 10 mL, Intravenous, Q12H  vitamin B-12, 100 mcg, Oral, Daily           Medication Review:     FINDINGS:   The study was limited to due to patient body habitus and limited  cooperation the right kidney has a maximal vxbk-ww-zuma length of 11.2  cm. The left kidney has a maximal azne-sr-laju length of 12.2 cm. The  echo pattern of both kidneys is normal. There are no masses and there is  no hydronephrosis. The renal veins are patent. Resistive indices on the  right are 0.7 on the left 0.8 maximal renal flow velocities on the right  five and on the left is 13.1 cm/s. The peak systolic velocity in the  aorta is 107 cm/s. The right renal aortic ratio is 0.5 and the left 0.6.        IMPRESSION:     1. Limited study due to the patient's body habitus and lack of  cooperation.  2. Elevated resistive indices which may reflect underlying intrinsic  renal disease.  3. No evidence of obstructive uropathy or significant renal artery  stenosis.      Assessment & Plan       Weakness    PVD (peripheral vascular disease) ([facility])    Essential hypertension    CKD (chronic kidney disease), stage III ([facility])    Mitral valve disease    NICM (nonischemic cardiomyopathy) ([facility])    Coronary artery disease involving native coronary artery of native heart without angina pectoris    Dyslipidemia    Chronic  combined systolic and diastolic heart failure (HCC)    Anemia    Fall    Dizziness    Acoustic neuroma (HCC)    Elevated troponin    CHF exacerbation (HCC)    Elevated serum creatinine      1- LACEY - non oliguric - Scr 1.9 at presentation - Resolved. FeUrea- 31% suggestive of pre-renal etiology. Renal duplex no evidence of significant QUEENIE. resolved  2- CKD stage III -baseline Scr 1.3-1.5 range. 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 Not a candidate for SGLT 2 inhibitor.   5- mild hyponatremia - resolved.   6- Anemia - tsat 12%   7- DM type II - not controlled A1c 9.0%- on insulin.   8- Volume overload - improving.   9- low albumin level      Plan    - Continue with current.   - Fluid restriction 1.2 lit/day   - Oral iron supplement   - Renal diet.   - Adjust meds per renal function   - No emergent need of RRT.        Jamie Gant MD  08/03/22  11:59 EDT

## 2022-08-04 LAB
ANION GAP SERPL CALCULATED.3IONS-SCNC: 8 MMOL/L (ref 5–15)
BUN SERPL-MCNC: 62 MG/DL (ref 8–23)
BUN/CREAT SERPL: 44.6 (ref 7–25)
CALCIUM SPEC-SCNC: 8.8 MG/DL (ref 8.6–10.5)
CHLORIDE SERPL-SCNC: 101 MMOL/L (ref 98–107)
CO2 SERPL-SCNC: 32 MMOL/L (ref 22–29)
CREAT SERPL-MCNC: 1.39 MG/DL (ref 0.57–1)
EGFRCR SERPLBLD CKD-EPI 2021: 37.7 ML/MIN/1.73
GLUCOSE BLDC GLUCOMTR-MCNC: 110 MG/DL (ref 70–130)
GLUCOSE BLDC GLUCOMTR-MCNC: 170 MG/DL (ref 70–130)
GLUCOSE BLDC GLUCOMTR-MCNC: 174 MG/DL (ref 70–130)
GLUCOSE BLDC GLUCOMTR-MCNC: 220 MG/DL (ref 70–130)
GLUCOSE SERPL-MCNC: 168 MG/DL (ref 65–99)
POTASSIUM SERPL-SCNC: 4.2 MMOL/L (ref 3.5–5.2)
SODIUM SERPL-SCNC: 141 MMOL/L (ref 136–145)

## 2022-08-04 PROCEDURE — 25010000002 HEPARIN (PORCINE) PER 1000 UNITS: Performed by: INTERNAL MEDICINE

## 2022-08-04 PROCEDURE — 80048 BASIC METABOLIC PNL TOTAL CA: CPT | Performed by: INTERNAL MEDICINE

## 2022-08-04 PROCEDURE — 63710000001 INSULIN LISPRO (HUMAN) PER 5 UNITS: Performed by: PHYSICIAN ASSISTANT

## 2022-08-04 PROCEDURE — 99232 SBSQ HOSP IP/OBS MODERATE 35: CPT | Performed by: PHYSICIAN ASSISTANT

## 2022-08-04 PROCEDURE — 82962 GLUCOSE BLOOD TEST: CPT

## 2022-08-04 PROCEDURE — 63710000001 INSULIN DETEMIR PER 5 UNITS: Performed by: PHYSICIAN ASSISTANT

## 2022-08-04 RX ORDER — POLYETHYLENE GLYCOL 3350 17 G/17G
17 POWDER, FOR SOLUTION ORAL 2 TIMES DAILY
Status: DISCONTINUED | OUTPATIENT
Start: 2022-08-04 | End: 2022-08-12

## 2022-08-04 RX ORDER — BISACODYL 5 MG/1
10 TABLET, DELAYED RELEASE ORAL ONCE
Status: COMPLETED | OUTPATIENT
Start: 2022-08-04 | End: 2022-08-04

## 2022-08-04 RX ORDER — BACLOFEN 10 MG/1
5 TABLET ORAL 2 TIMES DAILY PRN
Status: DISCONTINUED | OUTPATIENT
Start: 2022-08-04 | End: 2022-08-06

## 2022-08-04 RX ORDER — OXYCODONE HYDROCHLORIDE 5 MG/1
2.5 TABLET ORAL EVERY 6 HOURS PRN
Status: DISCONTINUED | OUTPATIENT
Start: 2022-08-04 | End: 2022-08-06

## 2022-08-04 RX ORDER — DIAZEPAM 2 MG/1
4 TABLET ORAL NIGHTLY PRN
Status: DISCONTINUED | OUTPATIENT
Start: 2022-08-04 | End: 2022-08-06

## 2022-08-04 RX ORDER — BACLOFEN 10 MG/1
10 TABLET ORAL NIGHTLY PRN
Status: DISCONTINUED | OUTPATIENT
Start: 2022-08-04 | End: 2022-08-04

## 2022-08-04 RX ADMIN — DICLOFENAC 2 G: 10 GEL TOPICAL at 08:29

## 2022-08-04 RX ADMIN — BUMETANIDE 1 MG: 1 TABLET ORAL at 08:28

## 2022-08-04 RX ADMIN — LIDOCAINE 1 PATCH: 50 PATCH CUTANEOUS at 08:28

## 2022-08-04 RX ADMIN — AMOXICILLIN AND CLAVULANATE POTASSIUM 1 TABLET: 875; 125 TABLET, FILM COATED ORAL at 08:26

## 2022-08-04 RX ADMIN — ISOSORBIDE DINITRATE 20 MG: 20 TABLET ORAL at 08:26

## 2022-08-04 RX ADMIN — HYDRALAZINE HYDROCHLORIDE 25 MG: 25 TABLET, FILM COATED ORAL at 21:10

## 2022-08-04 RX ADMIN — HEPARIN SODIUM 5000 UNITS: 5000 INJECTION INTRAVENOUS; SUBCUTANEOUS at 08:28

## 2022-08-04 RX ADMIN — ASPIRIN 81 MG: 81 TABLET, COATED ORAL at 08:26

## 2022-08-04 RX ADMIN — DICLOFENAC 2 G: 10 GEL TOPICAL at 21:11

## 2022-08-04 RX ADMIN — ALLOPURINOL 300 MG: 300 TABLET ORAL at 08:26

## 2022-08-04 RX ADMIN — ATORVASTATIN CALCIUM 80 MG: 40 TABLET, FILM COATED ORAL at 21:10

## 2022-08-04 RX ADMIN — POLYETHYLENE GLYCOL 3350 17 G: 17 POWDER, FOR SOLUTION ORAL at 08:27

## 2022-08-04 RX ADMIN — Medication 10 ML: at 21:10

## 2022-08-04 RX ADMIN — INSULIN DETEMIR 22 UNITS: 100 INJECTION, SOLUTION SUBCUTANEOUS at 08:22

## 2022-08-04 RX ADMIN — CASTOR OIL AND BALSAM, PERU 1 APPLICATION: 788; 87 OINTMENT TOPICAL at 21:11

## 2022-08-04 RX ADMIN — OXYCODONE 2.5 MG: 5 TABLET ORAL at 01:12

## 2022-08-04 RX ADMIN — DICLOFENAC 2 G: 10 GEL TOPICAL at 17:57

## 2022-08-04 RX ADMIN — POLYETHYLENE GLYCOL 3350 17 G: 17 POWDER, FOR SOLUTION ORAL at 17:57

## 2022-08-04 RX ADMIN — INSULIN LISPRO 3 UNITS: 100 INJECTION, SOLUTION INTRAVENOUS; SUBCUTANEOUS at 12:04

## 2022-08-04 RX ADMIN — INSULIN LISPRO 2 UNITS: 100 INJECTION, SOLUTION INTRAVENOUS; SUBCUTANEOUS at 08:27

## 2022-08-04 RX ADMIN — MAGNESIUM HYDROXIDE 10 ML: 2400 SUSPENSION ORAL at 12:04

## 2022-08-04 RX ADMIN — OXYCODONE 2.5 MG: 5 TABLET ORAL at 08:25

## 2022-08-04 RX ADMIN — GUAIFENESIN 600 MG: 600 TABLET, EXTENDED RELEASE ORAL at 08:25

## 2022-08-04 RX ADMIN — HYDRALAZINE HYDROCHLORIDE 25 MG: 25 TABLET, FILM COATED ORAL at 05:58

## 2022-08-04 RX ADMIN — HYDRALAZINE HYDROCHLORIDE 25 MG: 25 TABLET, FILM COATED ORAL at 14:41

## 2022-08-04 RX ADMIN — AMLODIPINE BESYLATE 5 MG: 5 TABLET ORAL at 08:27

## 2022-08-04 RX ADMIN — SENNOSIDES AND DOCUSATE SODIUM 1 TABLET: 50; 8.6 TABLET ORAL at 21:10

## 2022-08-04 RX ADMIN — INSULIN LISPRO 6 UNITS: 100 INJECTION, SOLUTION INTRAVENOUS; SUBCUTANEOUS at 12:04

## 2022-08-04 RX ADMIN — INSULIN LISPRO 6 UNITS: 100 INJECTION, SOLUTION INTRAVENOUS; SUBCUTANEOUS at 08:29

## 2022-08-04 RX ADMIN — HEPARIN SODIUM 5000 UNITS: 5000 INJECTION INTRAVENOUS; SUBCUTANEOUS at 21:10

## 2022-08-04 RX ADMIN — BACLOFEN 10 MG: 10 TABLET ORAL at 08:26

## 2022-08-04 RX ADMIN — GUAIFENESIN 600 MG: 600 TABLET, EXTENDED RELEASE ORAL at 21:11

## 2022-08-04 RX ADMIN — VITAM B12 100 MCG: 100 TAB at 08:27

## 2022-08-04 RX ADMIN — PREGABALIN 50 MG: 50 CAPSULE ORAL at 21:10

## 2022-08-04 RX ADMIN — CASTOR OIL AND BALSAM, PERU 1 APPLICATION: 788; 87 OINTMENT TOPICAL at 08:29

## 2022-08-04 RX ADMIN — BUMETANIDE 1 MG: 1 TABLET ORAL at 18:01

## 2022-08-04 RX ADMIN — BISACODYL 10 MG: 5 TABLET, COATED ORAL at 12:04

## 2022-08-04 RX ADMIN — DICLOFENAC 2 G: 10 GEL TOPICAL at 14:40

## 2022-08-04 RX ADMIN — SENNOSIDES AND DOCUSATE SODIUM 1 TABLET: 50; 8.6 TABLET ORAL at 08:27

## 2022-08-04 RX ADMIN — INSULIN LISPRO 2 UNITS: 100 INJECTION, SOLUTION INTRAVENOUS; SUBCUTANEOUS at 17:57

## 2022-08-04 RX ADMIN — Medication 10 ML: at 08:29

## 2022-08-04 RX ADMIN — INSULIN LISPRO 6 UNITS: 100 INJECTION, SOLUTION INTRAVENOUS; SUBCUTANEOUS at 17:57

## 2022-08-04 RX ADMIN — PREGABALIN 50 MG: 50 CAPSULE ORAL at 08:27

## 2022-08-04 RX ADMIN — ISOSORBIDE DINITRATE 20 MG: 20 TABLET ORAL at 21:10

## 2022-08-04 NOTE — PROGRESS NOTES
INFECTIOUS DISEASE  Progress Note    Susan Anderson  1940  [id number]    Admission Date: 7/26/2022      Requesting Provider: Brigid Seo MD  Evaluating Physician: Stef Pete MD    Reason for Consultation: Bacteremia    History of present illness:    7/29/22: Patient is a 82 y.o. female with h/o T2DM, CHF, CKD Stage III, HTN, chronic BLE edema, chronic right lymphedema, ELIUD/nocturnal 2L O2 NC, acoustic neuroma, CAD, T2DM, gout, HLD, asthma, PVD/right great toe TMA 4/2021/now on oral Augmentin therapy for 6 to 12 weeks from 5/19/22, cervical cancer/hysterectomy, and chronic mixed diastolic/systolic CHF, and NICM with EF 40-45% who we were asked to see for MRSA bacteremia.  The patient presented to [facility] ED on 7/26 after falling at home and pinned between the toilet and her wheelchair.  She lives alone and is unsure what happened, but states that she did not lose consciousness.  She had episode of severe diarrhea before and after falling.  Her daughter found her on the floor and called EMS.  She developed a spasm in her left lower back radiating to her hip and LLE making it difficult to get her up off the floor.  He also scraped her right knee.  Her bumex was recently doubled for 3 days for BLE edema.  She is not very mobile or independent requiring wheelchair for mobilization and requires assistance for showering.  She denies fever, chills, shortness of breath, nausea, vomiting, diarrhea, or dysuria.  She has had some nasal congestion.  On arrival to ED, her Tlow was 93.9 and her HR was bradycardic.  Admitting labs were A1C 9.0, WBC 6300 with 81% segs/2% bands, , creatinine 1.8 (baseline around 1.2-1.4), PCT 0.09, lactic acid 1.1, and UA WBC 0-2.  Blood cultures are positive in 2 of 2 sets (4 of 4 bottles) with GPC and BCID positive for MRSA and CoNS.  A COVID-19/Flu PCR was negative.  Imaging showed no evidence of acute fractures.  She had some swelling of right side of periorbital/cheek soft  tissue, chronic right maxillary sinusitis, mild pulmonary edema, subcutaneous edema of right chest and lateral abdominal wall with small volume ascites similar to 7/13/22, and prepatellar soft tissue swelling c/w hematoma or bursitis.  A CT scan of head showed no bleeds. She is currently on Vancomycin.  ID was asked to evaluate and manage her antibiotic therapy.    Her daughter indicates that she was lying on the floor at home for least 10 hours, unable to arise.  Her usual caretaker did not come to the house that day.    7/30/22: She has remained afebrile. Repeat blood cultures from 7/29 are pending.  Right prepatellar aspirate Gram stain revealed rare white blood cells with no organisms seen. Blood cultures from 726 have grown Staph epidermidis in both sets and MRSA in 1 set. Echocardiogram yesterday revealed mild mitral regurgitation.  She continues to complain of severe right knee pain.  Orthopedics has ordered an MRI scan.    8/1/22: She has remained afebrile. Follow-up blood cultures from 7/29 at been negative.  Right prepatellar aspirate culture is negative so far.  MRI scan of the right knee Revealed a moderate knee effusion which was nonspecific.  She had diffuse circumferential soft tissue swelling with isointense prepatellar collection consistent with hematoma versus infectious bursitis versus posttraumatic.  She was also noted to have complex degenerative tearing of the menisci.  She had an indistinct anterior cruciate ligament. White blood cell count today is 5.8.  Creatinine is 1.43.  She feels better today.  She has some improvement in her right knee pain.     8/2/22: She has remained afebrile.  She still complains of right knee pain.  She was able to get out of bed with assistance yesterday. Her creatinine today is 1.34.  Her white blood cell count is 4.7. Overall, she feels somewhat better.    8/3/22: She has remained afebrile.  She is on oral Augmentin. She denies nausea or vomiting.  She has some  decreased right knee pain.    8/4/22: She remains afebrile. She feels somewhat better today.  She has decreased right knee pain.  She complains of fatigue.        Past Medical History:   Diagnosis Date   • Arthritis    • Asthma 6/4 - double pneumonia    Currently on inhaler and nebulizer   • Cancer (HCC)     cervical cancer, skin cancer   • CHF (congestive heart failure) (Formerly [facility]) June 4, 2021   • Chronic kidney disease Related to diabetes   • Coronary artery disease 6/4/2021 DX for hear failure   • Diabetes mellitus (HCC) 30 years    Seeing Dr. Lam 1st time Aug 19   • Gout    • Hx of colonoscopy    • Hyperlipidemia Reference current labs x 2-3yrs approx   • Hypertension 30 years   • Migraine    • Mitral valve disease    • Mitral valve disease    • Osteomyelitis ([facility])    • Peripheral neuropathy    • Sleep apnea    • Type 2 diabetes mellitus (HCC)     30 years       Past Surgical History:   Procedure Laterality Date   • ABDOMINAL HYSTERECTOMY W/SALPINGECTOMY     • AMPUTATION  Right great toe, 1st 1/3 metatarsal -[place] 4/14/21   • AORTAGRAM N/A 4/9/2021    Procedure: AORTAGRAM WITH OR WITHOUT RUNOFFS, WITH Co2;  Surgeon: Trey Forrest MD;  Location:  [facility];  Service: Vascular;  Laterality: N/A;   • APPENDECTOMY     • BRAIN TUMOR EXCISION      laser surgery    • CARDIAC CATHETERIZATION N/A 6/21/2021    Procedure: LEFT HEART CATH;  Surgeon: Anjum Ceballos MD;  Location:  [facility];  Service: Cardiology;  Laterality: N/A;   • CATARACT EXTRACTION W/ INTRAOCULAR LENS  IMPLANT, BILATERAL     • CHOLECYSTECTOMY     • EYE SURGERY     • HYSTERECTOMY     • TOE SURGERY     • TRANS METATARSAL AMPUTATION Right 4/14/2021    Procedure: AMPUTATION TRANS METATARSAL RIGHT GREAT TOE;  Surgeon: Valdez Finney MD;  Location:  [facility];  Service: Vascular;  Laterality: Right;       Family History   Problem Relation Age of Onset   • Diabetes Mother         Type II   • Heart disease Father    • Heart attack Father     • Coronary artery disease Father    • Diabetes Father         Type II   • Diabetes Sister    • Diabetes Maternal Grandmother    • Diabetes Maternal Grandfather    • Diabetes Paternal Grandmother    • Diabetes Paternal Grandfather        Social History     Socioeconomic History   • Marital status:    • Number of children: 1   Tobacco Use   • Smoking status: Never Smoker   • Smokeless tobacco: Never Used   Vaping Use   • Vaping Use: Never used   Substance and Sexual Activity   • Alcohol use: Yes     Comment: Social drinking when not recovering from hospitalization   • Drug use: Never   • Sexual activity: Not Currently     Birth control/protection: None       Allergies   Allergen Reactions   • Cephalexin Nausea Only   • Erythromycin Base Nausea Only   • Melatonin Other (See Comments)     Nightmares   • Oxycodone Nausea Only   • Sulfa Antibiotics Nausea Only         Medication:    Current Facility-Administered Medications:   •  acetaminophen (TYLENOL) tablet 650 mg, 650 mg, Oral, Q4H PRN, 650 mg at 08/03/22 0813 **OR** [DISCONTINUED] acetaminophen (TYLENOL) 160 MG/5ML solution 650 mg, 650 mg, Oral, Q4H PRN **OR** [DISCONTINUED] acetaminophen (TYLENOL) suppository 650 mg, 650 mg, Rectal, Q4H PRN, Breana, Chey G, DO  •  allopurinol (ZYLOPRIM) tablet 300 mg, 300 mg, Oral, Daily, Breana, Chey G, DO, 300 mg at 08/03/22 0814  •  amLODIPine (NORVASC) tablet 5 mg, 5 mg, Oral, Daily, Breana, Chey G, DO, 5 mg at 08/03/22 0814  •  amoxicillin-clavulanate (AUGMENTIN) 875-125 MG per tablet 1 tablet, 1 tablet, Oral, Daily, Stef Pete MD, 1 tablet at 08/03/22 0814  •  aspirin EC tablet 81 mg, 81 mg, Oral, Daily, Breana, Chey G, DO, 81 mg at 08/03/22 0814  •  atorvastatin (LIPITOR) tablet 80 mg, 80 mg, Oral, Nightly, Anna Crystal PA-C, 80 mg at 08/03/22 2125  •  baclofen (LIORESAL) tablet 10 mg, 10 mg, Oral, Q8H PRN, Ruddy Guerrier, DO, 10 mg at 08/03/22 1322  •  bisacodyl (DULCOLAX) EC tablet 10 mg, 10  mg, Oral, Daily PRN, Anna Crystal PA-C, 10 mg at 08/02/22 0821  •  bisacodyl (DULCOLAX) suppository 10 mg, 10 mg, Rectal, Daily PRN, Anna Crystal PA-C  •  bumetanide (BUMEX) tablet 1 mg, 1 mg, Oral, BID, Anna Crystal PA-C, 1 mg at 08/03/22 1707  •  castor oil-balsam peru (VENELEX) ointment 1 application, 1 application, Topical, Q12H, Brigid Seo MD, 1 application at 08/03/22 2127  •  dextrose (D50W) (25 g/50 mL) IV injection 25 g, 25 g, Intravenous, Q15 Min PRN, BreanaMiniChey G, DO  •  dextrose (GLUTOSE) oral gel 15 g, 15 g, Oral, Q15 Min PRN, BreanaMiniChey G, DO  •  diazePAM (VALIUM) tablet 4 mg, 4 mg, Oral, Q8H PRN, Ruddy Guerrier, DO, 4 mg at 08/03/22 2126  •  Diclofenac Sodium (VOLTAREN) 1 % gel 2 g, 2 g, Topical, 4x Daily, Brigid Seo MD, 2 g at 08/03/22 2127  •  glucagon (human recombinant) (GLUCAGEN DIAGNOSTIC) injection 1 mg, 1 mg, Intramuscular, Q15 Min PRN, BreanaMiniChey G, DO  •  guaiFENesin (MUCINEX) 12 hr tablet 600 mg, 600 mg, Oral, Q12H, Anna Crystal PA-C, 600 mg at 08/03/22 2125  •  heparin (porcine) 5000 UNIT/ML injection 5,000 Units, 5,000 Units, Subcutaneous, Q12H, Chey Toussaint, DO, 5,000 Units at 08/03/22 2123  •  hydrALAZINE (APRESOLINE) tablet 25 mg, 25 mg, Oral, Q8H, Chey Ruddy, DO, 25 mg at 08/04/22 0558  •  insulin detemir (LEVEMIR) injection 22 Units, 22 Units, Subcutaneous, Daily, Anna Crystal PA-C, 22 Units at 08/03/22 0813  •  Insulin Lispro (humaLOG) injection 0-7 Units, 0-7 Units, Subcutaneous, TID AC, Anna Crystal PA-C, 3 Units at 08/02/22 1259  •  Insulin Lispro (humaLOG) injection 6 Units, 6 Units, Subcutaneous, TID With Meals, Anna Crystal PA-C  •  isosorbide dinitrate (ISORDIL) tablet 20 mg, 20 mg, Oral, BID, Chey Toussaint DO, 20 mg at 08/03/22 2125  •  lidocaine (LIDODERM) 5 % 1 patch, 1 patch, Transdermal, Q24H, Chey Toussaint DO, 1 patch at 08/03/22 0814  •  magnesium hydroxide (MILK OF MAGNESIA)  suspension 10 mL, 10 mL, Oral, Daily PRN, Anna Crystal PA-C, 10 mL at 08/02/22 0820  •  ondansetron (ZOFRAN) tablet 4 mg, 4 mg, Oral, Q6H PRN **OR** ondansetron (ZOFRAN) injection 4 mg, 4 mg, Intravenous, Q6H PRN, Breana, Chey G, DO, 4 mg at 08/02/22 0106  •  oxyCODONE (ROXICODONE) immediate release tablet 2.5 mg, 2.5 mg, Oral, Q4H PRN, Chey, Ruddy, DO, 2.5 mg at 08/04/22 0112  •  polyethylene glycol (MIRALAX) packet 17 g, 17 g, Oral, Daily, Chey, Ruddy, DO, 17 g at 08/03/22 0813  •  pregabalin (LYRICA) capsule 50 mg, 50 mg, Oral, Q12H, Breana, Chey G, DO, 50 mg at 08/03/22 2125  •  sennosides-docusate (PERICOLACE) 8.6-50 MG per tablet 1 tablet, 1 tablet, Oral, BID, Chey, Ruddy, DO, 1 tablet at 08/03/22 2125  •  [COMPLETED] Insert peripheral IV, , , Once **AND** sodium chloride 0.9 % flush 10 mL, 10 mL, Intravenous, PRN, Breana, Chey G, DO  •  sodium chloride 0.9 % flush 10 mL, 10 mL, Intravenous, Q12H, Breana, Chey G, DO, 10 mL at 08/03/22 2126  •  sodium chloride 0.9 % flush 10 mL, 10 mL, Intravenous, PRN, Breana, Chey G, DO  •  vitamin B-12 (CYANOCOBALAMIN) tablet 100 mcg, 100 mcg, Oral, Daily, Breana, Chey G, DO, 100 mcg at 08/03/22 0814    Antibiotics:  Anti-Infectives (From admission, onward)    Ordered     Dose/Rate Route Frequency Start Stop    08/01/22 1229  amoxicillin-clavulanate (AUGMENTIN) 875-125 MG per tablet 1 tablet        Ordering Provider: Stef Pete MD    1 tablet Oral Daily 08/01/22 1315 08/31/22 0859    07/28/22 0859  vancomycin (VANCOCIN) 1000 mg/200 mL dextrose 5% IVPB        Ordering Provider: Tom Phelan, PharmD    10 mg/kg × 104 kg  over 60 Minutes Intravenous Once 07/28/22 1000 07/28/22 1028    07/27/22 1824  vancomycin 1750 mg/500 mL 0.9% NS IVPB ([facility])        Ordering Provider: Adalid Mcrae RPH    20 mg/kg × 90.7 kg  over 105 Minutes Intravenous Once 07/27/22 1915 07/27/22 2204            Review of Systems:  See HPI    Physical Exam:   Vital  Signs  Temp (24hrs), Av.7 °F (36.5 °C), Min:97.4 °F (36.3 °C), Max:97.8 °F (36.6 °C)    Temp  Min: 97.4 °F (36.3 °C)  Max: 97.8 °F (36.6 °C)  BP  Min: 121/80  Max: 152/68  Pulse  Min: 65  Max: 74  Resp  Min: 16  Max: 20  SpO2  Min: 90 %  Max: 96 %    GENERAL: She is drowsy but in no acute distress  HEENT: Normocephalic, atraumatic.  PERRL. EOMI. No conjunctival injection. No icterus. Oropharynx clear without evidence of thrush or exudate.   NECK: Supple   HEART: RRR; No murmur, rubs, gallops.   LUNGS: Clear to auscultation bilaterally without wheezing, rales, rhonchi. Normal respiratory effort. Nonlabored. No dullness.  ABDOMEN: Soft, nontender, nondistended. No rebound or guarding. NO mass or HSM.  EXT:  No cyanosis, clubbing or edema. No cord.  :  Without Bui catheter.  MSK: Her right pretibial wound is 3-4 cm in diameter with some residual slough but no cellulitis.  There is decreasedprepatellar swelling secondary to hematoma.  SKIN: Warm and dry without cutaneous eruptions on Inspection/palpation.    NEURO: Drowsy but arousable.  She moves all of her extremities.    Laboratory Data    Results from last 7 days   Lab Units 22  0701 22  0708 22  0525   WBC 10*3/mm3 4.72 5.77 5.32   HEMOGLOBIN g/dL 9.7* 9.5* 9.1*   HEMATOCRIT % 30.7* 29.5* 27.1*   PLATELETS 10*3/mm3 147 188 184     Results from last 7 days   Lab Units 22  0854   SODIUM mmol/L 143   POTASSIUM mmol/L 4.5   CHLORIDE mmol/L 102   CO2 mmol/L 32.0*   BUN mg/dL 65*   CREATININE mg/dL 1.38*   GLUCOSE mg/dL 163*   CALCIUM mg/dL 8.7     Results from last 7 days   Lab Units 22  0525   ALK PHOS U/L 197*   BILIRUBIN mg/dL 0.7   ALT (SGPT) U/L 24   AST (SGOT) U/L 29                 Results from last 7 days   Lab Units 22  1647 22  1456   CK TOTAL U/L 344* 334*         Estimated Creatinine Clearance: 34.8 mL/min (A) (by C-G formula based on SCr of 1.38 mg/dL (H)).      Microbiology:  Microbiology Results (last 10  days)     Procedure Component Value - Date/Time    Eosinophil Smear - Urine, Urine, Clean Catch [[id number]]  (Normal) Collected: 07/31/22 1503    Lab Status: Final result Specimen: Urine, Clean Catch Updated: 07/31/22 1627     Eosinophil Smear 0 % EOS/100 Cells     Narrative:      No eosinophil seen    Blood Culture - Blood, Arm, Right [[id number]]  (Normal) Collected: 07/29/22 1456    Lab Status: Final result Specimen: Blood from Arm, Right Updated: 08/03/22 1517     Blood Culture No growth at 5 days    Blood Culture - Blood, Hand, Left [[id number]]  (Normal) Collected: 07/29/22 1456    Lab Status: Final result Specimen: Blood from Hand, Left Updated: 08/03/22 1517     Blood Culture No growth at 5 days    Body Fluid Culture - Body Fluid, Knee, Right [[id number]] Collected: 07/29/22 1400    Lab Status: Final result Specimen: Body Fluid from Knee, Right Updated: 08/03/22 1009     Body Fluid Culture No growth at 5 days     Gram Stain Rare (1+) WBCs seen      No organisms seen    COVID PRE-OP / PRE-PROCEDURE SCREENING ORDER (NO ISOLATION) - Swab, Nasopharynx [[id number]]  (Normal) Collected: 07/26/22 2300    Lab Status: Final result Specimen: Swab from Nasopharynx Updated: 07/26/22 2340    Narrative:      The following orders were created for panel order COVID PRE-OP / PRE-PROCEDURE SCREENING ORDER (NO ISOLATION) - Swab, Nasopharynx.  Procedure                               Abnormality         Status                     ---------                               -----------         ------                     COVID-19 and FLU A/B PCR...[[id number]]  Normal              Final result                 Please view results for these tests on the individual orders.    Blood Culture - Blood, Arm, Left [[id number]]  (Abnormal)  (Susceptibility) Collected: 07/26/22 2300    Lab Status: Edited Result - FINAL Specimen: Blood from Arm, Left Updated: 07/30/22 0819     Blood Culture Staphylococcus aureus, MRSA     Comment: Infectious disease  consultation is highly recommended to rule out distant foci of infection.  Methicillin resistant Staphylococcus aureus, Patient may be an isolation risk.        Isolated from Aerobic Bottle     Blood Culture Staphylococcus epidermidis     Isolated from Aerobic and Anaerobic Bottles     Gram Stain Aerobic Bottle Gram positive cocci in groups      Anaerobic Bottle Gram positive cocci in groups    Susceptibility      Staphylococcus aureus, MRSA      NAIMA      Daptomycin Susceptible (C)  [1]       Gentamicin Susceptible      Oxacillin Resistant     Rifampin Susceptible      Vancomycin Susceptible                   [1]  Appended report. These results have been appended to a previously final verified report.             Susceptibility      Staphylococcus epidermidis      NAIMA      Oxacillin Resistant     Vancomycin Susceptible                       Susceptibility Comments     Staphylococcus aureus, MRSA    Dapto requested by Dr. Pete 7/30  This isolate does not demonstrate inducible clindamycin resistance in vitro.      Staphylococcus epidermidis    This isolate is presumed to be clindamycin resistant based on detection of inducible clindamycin resistance.  Clindamycin may still be effective in some patients.             Blood Culture - Blood, Arm, Right [[id number]]  (Abnormal) Collected: 07/26/22 2300    Lab Status: Final result Specimen: Blood from Arm, Right Updated: 07/30/22 0623     Blood Culture Staphylococcus epidermidis     Isolated from Aerobic and Anaerobic Bottles     Gram Stain Anaerobic Bottle Gram positive cocci in groups      Aerobic Bottle Gram positive cocci in groups    Narrative:      Refer to previous blood culture collected on 7/26/2022 2300 for MICs    COVID-19 and FLU A/B PCR - Swab, Nasopharynx [[id number]]  (Normal) Collected: 07/26/22 2300    Lab Status: Final result Specimen: Swab from Nasopharynx Updated: 07/26/22 2340     COVID19 Not Detected     Influenza A PCR Not Detected     Influenza B  PCR Not Detected    Narrative:      Fact sheet for providers: https://www.fda.gov/media/512350/download    Fact sheet for patients: https://www.fda.gov/media/427050/download    Test performed by PCR.    Blood Culture ID, PCR - Blood, Arm, Left [[id number]]  (Abnormal) Collected: 07/26/22 2300    Lab Status: Final result Specimen: Blood from Arm, Left Updated: 07/27/22 1811     BCID, PCR Staph aureus. mecA/C and MREJ (methicillin resistance gene) detected. Identification by BCID2 PCR.     BCID, PCR 2 Staph spp, not aureus or lugdunesis. Identification by BCID2 PCR.    Narrative:      Infectious disease consultation is highly recommended to rule out distant foci of infection.                Radiology:  XR Chest 1 View    Result Date: 8/3/2022  Congestive heart failure with moderate bibasilar effusion/atelectasis.  This report was finalized on 8/3/2022 12:17 PM by Dr. Breezy Rey MD.      MRI Knee Right Without Contrast    Result Date: 8/1/2022   No acute osseous findings.  There is a moderate knee joint effusion which is nonspecific. No obvious destructive joint space loss, discrete bony erosion, or subchondral marrow edema to suggest septic joint otherwise, however clinical correlation is required and consider joint fluid sampling as clinically indicated.  Diffuse circumferential soft tissue swelling/subcutaneous edema about the knee, including isointense prepatellar collection which may reflect posttraumatic versus infectious bursitis versus hematoma.  Moderate to severe tricompartmental osteoarthritic change. Complex degenerative tearing of the menisci.  Indistinctness of the anterior cruciate ligament, difficult to distinguish whether this reflects poor visualization from motion degradation artifact versus mucoid degeneration or chronic tear.  This report was finalized on 8/1/2022 8:31 AM by Kings Baez MD.      Duplex Renal Artery - Bilateral Complete CAR    Result Date: 8/1/2022   1. Limited study due to the  patient's body habitus and lack of cooperation. 2. Elevated resistive indices which may reflect underlying intrinsic renal disease. 3. No evidence of obstructive uropathy or significant renal artery stenosis.  This report was finalized on 8/1/2022 4:20 PM by Trey Saba MD.        Impression:   1. 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  The fact that she has 2 positive blood cultures for staph epidermis with MRSA also one of the sets suggest that the isolates all skin contaminants.  In addition, follow-up blood cultures have been negative.  She does not have any focal evidence of MRSA infection.  She is now stable off of intravenous antibiotics  2. Right prepatellar hematoma/right knee pain-her routine x-ray was negative for fracture.  MRI scan does not reveal a fracture or tendon rupture.  3. Recent onset of left pretibial ulcer  4. 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 On chronic Augmentin oral suppressive therapy for about a year now.  H/o MSSA.  5. Hypothermia, improved  6. Acute on chronic kidney disease Stage III, ATN vs rhabdomyolysis  7. Rhadomyolysis, after fall and lying on right side.  8. Right-sided edema/anasarca  9. Elevated LFTs/bilirubin related to above.   10. Chronic BLE edema/RLE lymphedema  11. Type 2 diabetes mellitus, poorly controlled  12. Essential hypertension  13. Nonischemic cardiomyopathy with EF 4-45%  14. Chronic diastolic/systolic mixed CHF  15. Acoustic neuroma  16. Cephelexin, erythromycin, and sulfa intolerances (GI intolerance).      PLAN/RECOMMENDATIONS:   1.  Continue off of intravenous antibiotic therapy  2.  Augmentin 875 mg p.o. daily for chronic suppression of right foot infection  3.  Mobilize  4.  Discharge soon      Setf Pete MD  8/4/2022  07:18 EDT

## 2022-08-04 NOTE — PROGRESS NOTES
"   LOS: 8 days    Patient Care Team:  Arleen Doherty MD as PCP - General (Internal Medicine)  Flory Sauer APRN (Nurse Practitioner)  Janeth Lam MD as Consulting Physician (Endocrinology)  Anjum Ceballos MD as Consulting Physician (Cardiology)    Chief Complaint:  SOA    Subjective     Interval History:     Appears confuse and lethargic. Received muscle relaxers    Review of Systems:   No CP or SOA , fever, chills, rigors, rash, N/V, Constipation.       Objective     Vital Sign Min/Max for last 24 hours  Temp  Min: 97.4 °F (36.3 °C)  Max: 97.7 °F (36.5 °C)   BP  Min: 139/61  Max: 153/69   Pulse  Min: 63  Max: 74   Resp  Min: 16  Max: 18   SpO2  Min: 90 %  Max: 96 %   Flow (L/min)  Min: 2  Max: 2.5   Weight  Min: 100 kg (221 lb 1.6 oz)  Max: 100 kg (221 lb 1.6 oz)     Flowsheet Rows    Flowsheet Row First Filed Value   Admission Height 160 cm (63\") Documented at 07/26/2022 2039   Admission Weight 90.7 kg (200 lb) Documented at 07/26/2022 2039          I/O this shift:  In: 592 [P.O.:592]  Out: -   I/O last 3 completed shifts:  In: 1553 [P.O.:1553]  Out: 150 [Urine:150]    Physical Exam:    Gen: Alert, NAD   HENT: NC, AT, EOMI   NECK: Supple, no JVD, Trachea midline   LUNGS: CTA bilaterally, non labored respirtation   CVS: S1/S2 audible, RRR, no murmur   Abd: Soft, NT, ND, BS+   Ext: + pedal edema, no cyanosis   CNS: Alert, No focal deficit noted grossly  Psy: Cooperative  Skin: Warm, dry and intact      WBC WBC   Date Value Ref Range Status   08/02/2022 4.72 3.40 - 10.80 10*3/mm3 Final      HGB Hemoglobin   Date Value Ref Range Status   08/02/2022 9.7 (L) 12.0 - 15.9 g/dL Final      HCT Hematocrit   Date Value Ref Range Status   08/02/2022 30.7 (L) 34.0 - 46.6 % Final      Platlets No results found for: LABPLAT   MCV MCV   Date Value Ref Range Status   08/02/2022 91.1 79.0 - 97.0 fL Final          Sodium Sodium   Date Value Ref Range Status   08/04/2022 141 136 - 145 mmol/L Final   08/03/2022 143 " 136 - 145 mmol/L Final   08/02/2022 143 136 - 145 mmol/L Final      Potassium Potassium   Date Value Ref Range Status   08/04/2022 4.2 3.5 - 5.2 mmol/L Final   08/03/2022 4.5 3.5 - 5.2 mmol/L Final   08/02/2022 4.6 3.5 - 5.2 mmol/L Final     Comment:     Slight hemolysis detected by analyzer. Results may be affected.      Chloride Chloride   Date Value Ref Range Status   08/04/2022 101 98 - 107 mmol/L Final   08/03/2022 102 98 - 107 mmol/L Final   08/02/2022 104 98 - 107 mmol/L Final      CO2 CO2   Date Value Ref Range Status   08/04/2022 32.0 (H) 22.0 - 29.0 mmol/L Final   08/03/2022 32.0 (H) 22.0 - 29.0 mmol/L Final   08/02/2022 31.0 (H) 22.0 - 29.0 mmol/L Final      BUN BUN   Date Value Ref Range Status   08/04/2022 62 (H) 8 - 23 mg/dL Final   08/03/2022 65 (H) 8 - 23 mg/dL Final   08/02/2022 65 (H) 8 - 23 mg/dL Final      Creatinine Creatinine   Date Value Ref Range Status   08/04/2022 1.39 (H) 0.57 - 1.00 mg/dL Final   08/03/2022 1.38 (H) 0.57 - 1.00 mg/dL Final   08/02/2022 1.34 (H) 0.57 - 1.00 mg/dL Final      Calcium Calcium   Date Value Ref Range Status   08/04/2022 8.8 8.6 - 10.5 mg/dL Final   08/03/2022 8.7 8.6 - 10.5 mg/dL Final   08/02/2022 9.2 8.6 - 10.5 mg/dL Final      PO4 No results found for: CAPO4   Albumin No results found for: ALBUMIN   Magnesium No results found for: MG   Uric Acid No results found for: URICACID        Results Review:     I reviewed the patient's new clinical results.    allopurinol, 300 mg, Oral, Daily  amLODIPine, 5 mg, Oral, Daily  amoxicillin-clavulanate, 1 tablet, Oral, Daily  aspirin, 81 mg, Oral, Daily  atorvastatin, 80 mg, Oral, Nightly  bumetanide, 1 mg, Oral, BID  castor oil-balsam peru, 1 application, Topical, Q12H  Diclofenac Sodium, 2 g, Topical, 4x Daily  guaiFENesin, 600 mg, Oral, Q12H  heparin (porcine), 5,000 Units, Subcutaneous, Q12H  hydrALAZINE, 25 mg, Oral, Q8H  insulin detemir, 22 Units, Subcutaneous, Daily  insulin lispro, 0-7 Units, Subcutaneous, TID  AC  Insulin Lispro, 6 Units, Subcutaneous, TID With Meals  isosorbide dinitrate, 20 mg, Oral, BID  lidocaine, 1 patch, Transdermal, Q24H  polyethylene glycol, 17 g, Oral, BID  pregabalin, 50 mg, Oral, Q12H  senna-docusate sodium, 1 tablet, Oral, BID  sodium chloride, 10 mL, Intravenous, Q12H  vitamin B-12, 100 mcg, Oral, Daily           Medication Review:     FINDINGS:   The study was limited to due to patient body habitus and limited  cooperation the right kidney has a maximal ospa-mh-lgvd length of 11.2  cm. The left kidney has a maximal urcz-hq-cljj length of 12.2 cm. The  echo pattern of both kidneys is normal. There are no masses and there is  no hydronephrosis. The renal veins are patent. Resistive indices on the  right are 0.7 on the left 0.8 maximal renal flow velocities on the right  five and on the left is 13.1 cm/s. The peak systolic velocity in the  aorta is 107 cm/s. The right renal aortic ratio is 0.5 and the left 0.6.        IMPRESSION:     1. Limited study due to the patient's body habitus and lack of  cooperation.  2. Elevated resistive indices which may reflect underlying intrinsic  renal disease.  3. No evidence of obstructive uropathy or significant renal artery  stenosis.      Assessment & Plan       Weakness    PVD (peripheral vascular disease) ([facility])    Essential hypertension    CKD (chronic kidney disease), stage III ([facility])    Mitral valve disease    NICM (nonischemic cardiomyopathy) ([facility])    Coronary artery disease involving native coronary artery of native heart without angina pectoris    Dyslipidemia    Chronic combined systolic and diastolic heart failure (HCC)    Anemia    Fall    Dizziness    Acoustic neuroma ([facility])    Elevated troponin    CHF exacerbation ([facility])    Elevated serum creatinine      1- LACEY - non oliguric - Scr 1.9 at presentation - Resolved. FeUrea- 31% suggestive of pre-renal etiology. Renal duplex no evidence of significant QUEENIE. resolved  2- CKD stage III -baseline Scr  1.3-1.5 range. 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 Not a candidate for SGLT 2 inhibitor.   5- mild hyponatremia - resolved.   6- Anemia - tsat 12%   7- DM type II - not controlled A1c 9.0%- on insulin.   8- Volume overload - improving.   9- low albumin level      Plan    - Continue with bumex 1 mg BID for optimization of volume status. Will need strict I/O to determine response to diuretics.   - Fluid restriction 1.2 lit/day   - Oral iron supplement   - Renal diet.   - Adjust meds per renal function   - No emergent need of RRT.        Cecil Neff MD  08/04/22  15:05 EDT

## 2022-08-04 NOTE — PLAN OF CARE
Goal Outcome Evaluation:  Plan of Care Reviewed With: patient   A&OX4. VSS SR on tele. Pt reports ongoing pain in right knee. Pain meds and anti anxiety meds given.Placed in speciality bed and bathed by staff. Awaiting placement

## 2022-08-04 NOTE — PLAN OF CARE
Goal Outcome Evaluation:  Plan of Care Reviewed With: patient        Progress: improving  Outcome Evaluation: Alert, oriented x 4. Moaning r/t pain. PO medication controlling pain. Voiding well. Purewick in place for strict I & Os. Drowsy today. will continue to monitor.

## 2022-08-04 NOTE — PROGRESS NOTES
[facility] Medicine Services  PROGRESS NOTE    Patient Name: Susan Anderson  : 1940  MRN: [id number]    Date of Admission: 2022  Primary Care Physician: Arleen Doherty MD    Subjective     CC: f/u weakness, constipation    HPI:  Patient sitting upright in bed, drifting to sleep between care. She reports she doesn't sleep as well at night due to muscle spasms and finds herself drowsy during the day. She denies pain or muscle spasms at this time and reports improvement in her vaginal discomfort after yesterday's removal of her purewick. She feels slightly short of air today and congested.    ROS:  Gen- No fevers, chills  CV- No chest pain, palpitations  Resp- No cough, dyspnea (+)  GI- No N/V/D, abd soft and nontender  Msk- No muscle pain or spasms    Objective     Vital Signs:   Temp:  [97.4 °F (36.3 °C)-97.7 °F (36.5 °C)] 97.5 °F (36.4 °C)  Heart Rate:  [63-74] 63  Resp:  [16-18] 18  BP: (139-153)/(61-72) 139/61  Flow (L/min):  [2] 2     Physical Exam:  Constitutional: Chronically ill appearing, appears comfortable, drowsy  HENT: NCAT, mucous membranes moist, congested  Respiratory: Crackles appreciated in lower lung fields, slightly improved from yesterday's exam, respiratory effort normal on 2L NC  Cardiovascular: RRR, no murmurs, rubs, or gallops  Gastrointestinal: Positive bowel sounds, mildly firm, non-tender to palpation  Musculoskeletal: 1-2+ pitting BLE edema, interval improvement of BUE edema, abdominal anasarca  Psychiatric: Cooperative  Neurologic: Numbness BLE; Oriented x 3, moves all extremities spontaneously without focal deficits, speech clear    Results Reviewed:  LAB RESULTS:      Lab 22  0701 22  0708 22  0525 22  0743 22  1456   WBC 4.72 5.77 5.32 5.34 5.82   HEMOGLOBIN 9.7* 9.5* 9.1* 8.8* 9.6*   HEMATOCRIT 30.7* 29.5* 27.1* 27.5* 29.2*   PLATELETS 147 188 184 160 186   NEUTROS ABS  --   --   --   --  4.06   IMMATURE GRANS  (ABS)  --   --   --   --  0.19*   LYMPHS ABS  --   --   --   --  0.86   MONOS ABS  --   --   --   --  0.61   EOS ABS  --   --   --   --  0.08   MCV 91.1 91.6 90.3 90.5 90.4         Lab 08/04/22  0812 08/03/22  0854 08/02/22  0701 08/01/22  0708 07/31/22  1647 07/31/22  0525   SODIUM 141 143 143 142  --  134*   POTASSIUM 4.2 4.5 4.6 4.2  --  4.2   CHLORIDE 101 102 104 103  --  98   CO2 32.0* 32.0* 31.0* 32.0*  --  31.0*   ANION GAP 8.0 9.0 8.0 7.0  --  5.0   BUN 62* 65* 65* 71*  --  79*   CREATININE 1.39* 1.38* 1.34* 1.43*  --  1.62*   EGFR 37.7* 38.1* 39.4* 36.5*  --  31.4*   GLUCOSE 168* 163* 167* 90  --  87   CALCIUM 8.8 8.7 9.2 9.0  --  8.3*   PHOSPHORUS  --   --   --   --  4.2  --          Lab 07/31/22  0525   TOTAL PROTEIN 5.1*   ALBUMIN 2.80*   GLOBULIN 2.3   ALT (SGPT) 24   AST (SGOT) 29   BILIRUBIN 0.7   ALK PHOS 197*         Lab 08/03/22  0854   PROBNP 3,452.0*     Brief Urine Lab Results  (Last result in the past 365 days)      Color   Clarity   Blood   Leuk Est   Nitrite   Protein   CREAT   Urine HCG        07/31/22 1503             27.0             Microbiology Results Abnormal     Procedure Component Value - Date/Time    Blood Culture - Blood, Arm, Right [[id number]]  (Normal) Collected: 07/29/22 1456    Lab Status: Final result Specimen: Blood from Arm, Right Updated: 08/03/22 1517     Blood Culture No growth at 5 days    Blood Culture - Blood, Hand, Left [[id number]]  (Normal) Collected: 07/29/22 1456    Lab Status: Final result Specimen: Blood from Hand, Left Updated: 08/03/22 1517     Blood Culture No growth at 5 days    Body Fluid Culture - Body Fluid, Knee, Right [[id number]] Collected: 07/29/22 1400    Lab Status: Final result Specimen: Body Fluid from Knee, Right Updated: 08/03/22 1009     Body Fluid Culture No growth at 5 days     Gram Stain Rare (1+) WBCs seen      No organisms seen    Eosinophil Smear - Urine, Urine, Clean Catch [[id number]]  (Normal) Collected: 07/31/22 1503    Lab Status: Final  result Specimen: Urine, Clean Catch Updated: 07/31/22 1627     Eosinophil Smear 0 % EOS/100 Cells     Narrative:      No eosinophil seen    COVID PRE-OP / PRE-PROCEDURE SCREENING ORDER (NO ISOLATION) - Swab, Nasopharynx [[id number]]  (Normal) Collected: 07/26/22 2300    Lab Status: Final result Specimen: Swab from Nasopharynx Updated: 07/26/22 2340    Narrative:      The following orders were created for panel order COVID PRE-OP / PRE-PROCEDURE SCREENING ORDER (NO ISOLATION) - Swab, Nasopharynx.  Procedure                               Abnormality         Status                     ---------                               -----------         ------                     COVID-19 and FLU A/B PCR...[[id number]]  Normal              Final result                 Please view results for these tests on the individual orders.    COVID-19 and FLU A/B PCR - Swab, Nasopharynx [[id number]]  (Normal) Collected: 07/26/22 2300    Lab Status: Final result Specimen: Swab from Nasopharynx Updated: 07/26/22 2340     COVID19 Not Detected     Influenza A PCR Not Detected     Influenza B PCR Not Detected    Narrative:      Fact sheet for providers: https://www.fda.gov/media/588355/download    Fact sheet for patients: https://www.fda.gov/media/469992/download    Test performed by PCR.        XR Chest 1 View    Result Date: 8/3/2022  DATE OF EXAM: 8/3/2022 11:30 AM  PROCEDURE: XR CHEST 1 VW-  INDICATIONS: hypoxia; R53.1-Weakness; W19.XXXA-Unspecified fall, initial encounter; R74.8-Abnormal levels of other serum enzymes; R77.8-Other specified abnormalities of plasma proteins; N18.9-Chronic kidney disease, unspecified; R73.9-Hyperglycemia, unspecified  COMPARISON: 7/26/2022  TECHNIQUE: Single radiographic AP view of the chest was obtained.  FINDINGS: The heart is enlarged and there is now extensive pulmonary interstitial edema, with moderate bibasilar effusion and atelectasis. No pneumothorax is seen. Appearance is consistent with congestive  heart failure.      Impression: Congestive heart failure with moderate bibasilar effusion/atelectasis.  This report was finalized on 8/3/2022 12:17 PM by Dr. Breezy Rey MD.      Results for orders placed during the hospital encounter of 07/26/22    Adult Transthoracic Echo Complete W/ Cont if Necessary Per Protocol    Interpretation Summary  · Normal left ventricular systolic function, estimated EF 55%.  · Aortic sclerosis without aortic stenosis or regurgitation.  · Mild mitral regurgitation.    I have reviewed the medications:  Scheduled Meds:allopurinol, 300 mg, Oral, Daily  amLODIPine, 5 mg, Oral, Daily  amoxicillin-clavulanate, 1 tablet, Oral, Daily  aspirin, 81 mg, Oral, Daily  atorvastatin, 80 mg, Oral, Nightly  bumetanide, 1 mg, Oral, BID  castor oil-balsam peru, 1 application, Topical, Q12H  Diclofenac Sodium, 2 g, Topical, 4x Daily  guaiFENesin, 600 mg, Oral, Q12H  heparin (porcine), 5,000 Units, Subcutaneous, Q12H  hydrALAZINE, 25 mg, Oral, Q8H  insulin detemir, 22 Units, Subcutaneous, Daily  insulin lispro, 0-7 Units, Subcutaneous, TID AC  Insulin Lispro, 6 Units, Subcutaneous, TID With Meals  isosorbide dinitrate, 20 mg, Oral, BID  lidocaine, 1 patch, Transdermal, Q24H  polyethylene glycol, 17 g, Oral, Daily  pregabalin, 50 mg, Oral, Q12H  senna-docusate sodium, 1 tablet, Oral, BID  sodium chloride, 10 mL, Intravenous, Q12H  vitamin B-12, 100 mcg, Oral, Daily      Continuous Infusions:   PRN Meds:.•  acetaminophen **OR** [DISCONTINUED] acetaminophen **OR** [DISCONTINUED] acetaminophen  •  baclofen  •  baclofen  •  bisacodyl  •  bisacodyl  •  dextrose  •  dextrose  •  diazePAM  •  glucagon (human recombinant)  •  magnesium hydroxide  •  ondansetron **OR** ondansetron  •  oxyCODONE  •  [COMPLETED] Insert peripheral IV **AND** sodium chloride  •  sodium chloride    Assessment & Plan     Active Hospital Problems    Diagnosis  POA   • **Weakness [R53.1]  Yes   • Anemia [D64.9]  Unknown   • Fall [W19.XXXA]   Unknown   • Dizziness [R42]  Unknown   • Acoustic neuroma (HCC) [D33.3]  Unknown   • Elevated troponin [R77.8]  Unknown   • CHF exacerbation ([facility]) [I50.9]  Unknown   • Elevated serum creatinine [R79.89]  Unknown   • NICM (nonischemic cardiomyopathy) ([facility]) [I42.8]  Yes   • Coronary artery disease involving native coronary artery of native heart without angina pectoris [I25.10]  Yes   • Dyslipidemia [E78.5]  Yes   • Chronic combined systolic and diastolic heart failure (HCC) [I50.42]  Yes   • Mitral valve disease [I05.9]  Yes   • PVD (peripheral vascular disease) ([facility]) [I73.9]  Yes   • Essential hypertension [I10]  Yes   • CKD (chronic kidney disease), stage III ([facility]) [N18.30]  Yes      Resolved Hospital Problems   No resolved problems to display.     Brief Hospital Course to date:  Susan Anderson is a 82 y.o. female with PMH significant for HTN, HLD, CAD, non-ischemic cardiomyopathy, chronic combined systolic/diastolic CHF, CKD III, PVD, insulin-dependent DMII, ELIUD (on 2L NC QHS, chronic BLE edema, and acoustic neuroma. She was admitted to [facility] 7/26/22 for mild rhabdomyolysis after a fall at home. Also found to have a/c CHF exacerbation and LACEY.     Fall at home  Generalized weakness  - Fall at home in bathroom when she opted not to wait for her bath aide to help her  - Cardiology has evaluated and feel her falls are likely mechanical and not cardiac. May have some orthostasis. Patient does report she feels better with SBP in 150's, so allowing some permissive hypertension   - PT/OT following - [facility] has accepted pending medical readiness     Rhabdomyolysis, resolved   -  on admission, s/p IV fluids     Acute on chronic combined systolic/diastolic CHF  Nonischemic cardiomyopathy   LACEY on CKD III  - Appreciate nephrology assistance  - 8/01 renal artery duplex suggestive of intrinsic disease but no significant renal artery stenosis or obstructive uropathy   - Baseline creatinine 1.3-1.5  -  Creatinine 1.9 on admission, has since returned to baseline. 1.39 today   - CXR 8/03 showed congestive heart failure with moderate bibasilar effusion/atelectasis  - Nephrology diuresing, now on Bumex 1mg BID. Discussed with nephrology today  - s/p IV albumin   - Fluid restriction per nephrology     Blood culture (+) MRSA, suspected contaminant   - 7/26 - 2 of 2 blood cultures (+) staph epidermis, 1 of 2 positive for MRSA  - Repeat blood cultures on 7/31 NGTD  - Appreciate ID assistance. No source of infection has been identified.  - Discussed with ID on 8/1, suspect contaminant. Antibiotics stopped    Right knee effusion   - Orthopedic surgery has evaluated  - Joint aspiration not consistent with infection. Culture NGTD  - MRI of R knee does not suggest structural injury to the knee  - WBAT. Knee immobilizer on for comfort  - Follow up with Dr. Pearce in 2-3 weeks     Low back spasms   - PRN Robaxin ineffective per patient  - Seems oversedated  - Change Baclofen 5mg BID PRN  - Continue Oxycodone 2.5 but decrease to Q6H PRN  - Change Valium 4mg QHS PRN only     Chronic anemia   - PO iron supplement     Insulin-dependent DMII   Diabetic peripheral neuropathy  - Hgb A1c 9.0%  - Continue Levemir 22 units daily and Lispro 6 units TID AC + SSI  - Continue Lyrica 50mg BID     History of R great toe osteomyelitis  - s/p TMA 4/2021 by Dr. Finney.   - Culture (+) MSSA  - Has been on chronic suppressive Augmentin for ~1 year  - Back on Augmentin per ID      Dizziness  Acoustic neuroma, chronic  - ?contributing to falls  - Had initial w/u for neuroma at [facility]  - Has not had recent follow up imaging, consider MRI with/without contrast if GFR continues to improve prior to DC     Essential hypertension  - Beta blocker stopped due to bradycardia  - Restarted Lipitor now that Daptomycin has been stopped  - Continue Amlodipine 5mg daily, Bumex 1mg BID, Hydralazine 25mg TID, Isordil 20mg TID,      Constipation  - Continue Miralax /  Senna  - Make Miralax BID  - Give Dulcolax 10mg and Milk of Mag 10mg now     Expected Discharge Location and Transportation: [facility] via EMS or medical transport van   Expected Discharge Date: 8/6/22 or later pending volume status / mental status and creatinine     DVT prophylaxis:Medical DVT prophylaxis orders are present.     AM-PAC 6 Clicks Score (PT): 13 (08/04/22 0800)    CODE STATUS:   Code Status and Medical Interventions:   Ordered at: 07/27/22 0143     Code Status (Patient has no pulse and is not breathing):    CPR (Attempt to Resuscitate)     Medical Interventions (Patient has pulse or is breathing):    Full Support     Anna Crystal PA-C  08/04/22

## 2022-08-05 LAB
ANION GAP SERPL CALCULATED.3IONS-SCNC: 11 MMOL/L (ref 5–15)
BUN SERPL-MCNC: 65 MG/DL (ref 8–23)
BUN/CREAT SERPL: 43 (ref 7–25)
CALCIUM SPEC-SCNC: 8.9 MG/DL (ref 8.6–10.5)
CHLORIDE SERPL-SCNC: 102 MMOL/L (ref 98–107)
CO2 SERPL-SCNC: 31 MMOL/L (ref 22–29)
CREAT SERPL-MCNC: 1.51 MG/DL (ref 0.57–1)
DEPRECATED RDW RBC AUTO: 56.6 FL (ref 37–54)
EGFRCR SERPLBLD CKD-EPI 2021: 34.2 ML/MIN/1.73
ERYTHROCYTE [DISTWIDTH] IN BLOOD BY AUTOMATED COUNT: 17 % (ref 12.3–15.4)
GLUCOSE BLDC GLUCOMTR-MCNC: 107 MG/DL (ref 70–130)
GLUCOSE BLDC GLUCOMTR-MCNC: 107 MG/DL (ref 70–130)
GLUCOSE BLDC GLUCOMTR-MCNC: 68 MG/DL (ref 70–130)
GLUCOSE BLDC GLUCOMTR-MCNC: 83 MG/DL (ref 70–130)
GLUCOSE BLDC GLUCOMTR-MCNC: 91 MG/DL (ref 70–130)
GLUCOSE BLDC GLUCOMTR-MCNC: 94 MG/DL (ref 70–130)
GLUCOSE SERPL-MCNC: 106 MG/DL (ref 65–99)
HCT VFR BLD AUTO: 28.8 % (ref 34–46.6)
HGB BLD-MCNC: 9.3 G/DL (ref 12–15.9)
MCH RBC QN AUTO: 29.9 PG (ref 26.6–33)
MCHC RBC AUTO-ENTMCNC: 32.3 G/DL (ref 31.5–35.7)
MCV RBC AUTO: 92.6 FL (ref 79–97)
PLATELET # BLD AUTO: 200 10*3/MM3 (ref 140–450)
PMV BLD AUTO: 12.4 FL (ref 6–12)
POTASSIUM SERPL-SCNC: 4.7 MMOL/L (ref 3.5–5.2)
RBC # BLD AUTO: 3.11 10*6/MM3 (ref 3.77–5.28)
SODIUM SERPL-SCNC: 144 MMOL/L (ref 136–145)
WBC NRBC COR # BLD: 6.16 10*3/MM3 (ref 3.4–10.8)

## 2022-08-05 PROCEDURE — 99233 SBSQ HOSP IP/OBS HIGH 50: CPT | Performed by: INTERNAL MEDICINE

## 2022-08-05 PROCEDURE — 63710000001 INSULIN DETEMIR PER 5 UNITS: Performed by: PHYSICIAN ASSISTANT

## 2022-08-05 PROCEDURE — 80048 BASIC METABOLIC PNL TOTAL CA: CPT | Performed by: PHYSICIAN ASSISTANT

## 2022-08-05 PROCEDURE — 82962 GLUCOSE BLOOD TEST: CPT

## 2022-08-05 PROCEDURE — 25010000002 HEPARIN (PORCINE) PER 1000 UNITS: Performed by: INTERNAL MEDICINE

## 2022-08-05 PROCEDURE — 85027 COMPLETE CBC AUTOMATED: CPT | Performed by: INTERNAL MEDICINE

## 2022-08-05 PROCEDURE — 63710000001 INSULIN LISPRO (HUMAN) PER 5 UNITS: Performed by: PHYSICIAN ASSISTANT

## 2022-08-05 RX ADMIN — HEPARIN SODIUM 5000 UNITS: 5000 INJECTION INTRAVENOUS; SUBCUTANEOUS at 08:57

## 2022-08-05 RX ADMIN — CASTOR OIL AND BALSAM, PERU 1 APPLICATION: 788; 87 OINTMENT TOPICAL at 21:05

## 2022-08-05 RX ADMIN — SENNOSIDES AND DOCUSATE SODIUM 1 TABLET: 50; 8.6 TABLET ORAL at 21:06

## 2022-08-05 RX ADMIN — HEPARIN SODIUM 5000 UNITS: 5000 INJECTION INTRAVENOUS; SUBCUTANEOUS at 21:05

## 2022-08-05 RX ADMIN — PREGABALIN 50 MG: 50 CAPSULE ORAL at 21:06

## 2022-08-05 RX ADMIN — OXYCODONE 2.5 MG: 5 TABLET ORAL at 09:08

## 2022-08-05 RX ADMIN — AMLODIPINE BESYLATE 5 MG: 5 TABLET ORAL at 08:57

## 2022-08-05 RX ADMIN — GUAIFENESIN 600 MG: 600 TABLET, EXTENDED RELEASE ORAL at 21:06

## 2022-08-05 RX ADMIN — ATORVASTATIN CALCIUM 80 MG: 40 TABLET, FILM COATED ORAL at 21:06

## 2022-08-05 RX ADMIN — INSULIN DETEMIR 22 UNITS: 100 INJECTION, SOLUTION SUBCUTANEOUS at 08:50

## 2022-08-05 RX ADMIN — INSULIN LISPRO 6 UNITS: 100 INJECTION, SOLUTION INTRAVENOUS; SUBCUTANEOUS at 08:50

## 2022-08-05 RX ADMIN — Medication 10 ML: at 08:58

## 2022-08-05 RX ADMIN — BACLOFEN 5 MG: 10 TABLET ORAL at 14:00

## 2022-08-05 RX ADMIN — HYDRALAZINE HYDROCHLORIDE 25 MG: 25 TABLET, FILM COATED ORAL at 14:00

## 2022-08-05 RX ADMIN — POLYETHYLENE GLYCOL 3350 17 G: 17 POWDER, FOR SOLUTION ORAL at 08:56

## 2022-08-05 RX ADMIN — LIDOCAINE 1 PATCH: 50 PATCH CUTANEOUS at 08:56

## 2022-08-05 RX ADMIN — DICLOFENAC 2 G: 10 GEL TOPICAL at 18:20

## 2022-08-05 RX ADMIN — CASTOR OIL AND BALSAM, PERU 1 APPLICATION: 788; 87 OINTMENT TOPICAL at 08:49

## 2022-08-05 RX ADMIN — ALLOPURINOL 300 MG: 300 TABLET ORAL at 08:57

## 2022-08-05 RX ADMIN — HYDRALAZINE HYDROCHLORIDE 25 MG: 25 TABLET, FILM COATED ORAL at 05:29

## 2022-08-05 RX ADMIN — DICLOFENAC 2 G: 10 GEL TOPICAL at 08:50

## 2022-08-05 RX ADMIN — SENNOSIDES AND DOCUSATE SODIUM 1 TABLET: 50; 8.6 TABLET ORAL at 08:57

## 2022-08-05 RX ADMIN — AMOXICILLIN AND CLAVULANATE POTASSIUM 1 TABLET: 875; 125 TABLET, FILM COATED ORAL at 08:57

## 2022-08-05 RX ADMIN — VITAM B12 100 MCG: 100 TAB at 08:57

## 2022-08-05 RX ADMIN — ASPIRIN 81 MG: 81 TABLET, COATED ORAL at 08:57

## 2022-08-05 RX ADMIN — INSULIN LISPRO 6 UNITS: 100 INJECTION, SOLUTION INTRAVENOUS; SUBCUTANEOUS at 11:53

## 2022-08-05 RX ADMIN — HYDRALAZINE HYDROCHLORIDE 25 MG: 25 TABLET, FILM COATED ORAL at 21:06

## 2022-08-05 RX ADMIN — ISOSORBIDE DINITRATE 20 MG: 20 TABLET ORAL at 21:06

## 2022-08-05 RX ADMIN — DIAZEPAM 4 MG: 2 TABLET ORAL at 21:06

## 2022-08-05 RX ADMIN — PREGABALIN 50 MG: 50 CAPSULE ORAL at 08:57

## 2022-08-05 RX ADMIN — POLYETHYLENE GLYCOL 3350 17 G: 17 POWDER, FOR SOLUTION ORAL at 17:33

## 2022-08-05 RX ADMIN — DICLOFENAC 2 G: 10 GEL TOPICAL at 11:54

## 2022-08-05 RX ADMIN — Medication 10 ML: at 21:06

## 2022-08-05 RX ADMIN — GUAIFENESIN 600 MG: 600 TABLET, EXTENDED RELEASE ORAL at 08:57

## 2022-08-05 RX ADMIN — ISOSORBIDE DINITRATE 20 MG: 20 TABLET ORAL at 08:57

## 2022-08-05 RX ADMIN — DICLOFENAC 2 G: 10 GEL TOPICAL at 21:05

## 2022-08-05 RX ADMIN — BUMETANIDE 1 MG: 1 TABLET ORAL at 08:57

## 2022-08-05 RX ADMIN — BUMETANIDE 1 MG: 1 TABLET ORAL at 17:33

## 2022-08-05 NOTE — PROGRESS NOTES
"   LOS: 9 days    Patient Care Team:  Arleen Doherty MD as PCP - General (Internal Medicine)  Flory Sauer APRN (Nurse Practitioner)  Janeth Lam MD as Consulting Physician (Endocrinology)  Anjum Ceballos MD as Consulting Physician (Cardiology)    Chief Complaint:  SOA    Subjective     Interval History:     Appears confuse and lethargic. Renal function stable    Review of Systems:   No CP or SOA , fever, chills, rigors, rash, N/V, Constipation.       Objective     Vital Sign Min/Max for last 24 hours  Temp  Min: 97.4 °F (36.3 °C)  Max: 97.8 °F (36.6 °C)   BP  Min: 142/69  Max: 159/78   Pulse  Min: 63  Max: 89   Resp  Min: 15  Max: 17   SpO2  Min: 92 %  Max: 97 %   Flow (L/min)  Min: 2.5  Max: 2.5   Weight  Min: 98.5 kg (217 lb 1.6 oz)  Max: 98.5 kg (217 lb 1.6 oz)     Flowsheet Rows    Flowsheet Row First Filed Value   Admission Height 160 cm (63\") Documented at 07/26/2022 2039   Admission Weight 90.7 kg (200 lb) Documented at 07/26/2022 2039          No intake/output data recorded.  I/O last 3 completed shifts:  In: 925 [P.O.:925]  Out: 875 [Urine:875]    Physical Exam:    Gen: Alert, NAD   HENT: NC, AT, EOMI   NECK: Supple, no JVD, Trachea midline   LUNGS: CTA bilaterally, non labored respirtation   CVS: S1/S2 audible, RRR, no murmur   Abd: Soft, NT, ND, BS+   Ext: + pedal edema, no cyanosis   CNS: Alert, No focal deficit noted grossly  Psy: Cooperative  Skin: Warm, dry and intact      WBC WBC   Date Value Ref Range Status   08/05/2022 6.16 3.40 - 10.80 10*3/mm3 Final      HGB Hemoglobin   Date Value Ref Range Status   08/05/2022 9.3 (L) 12.0 - 15.9 g/dL Final      HCT Hematocrit   Date Value Ref Range Status   08/05/2022 28.8 (L) 34.0 - 46.6 % Final      Platlets No results found for: LABPLAT   MCV MCV   Date Value Ref Range Status   08/05/2022 92.6 79.0 - 97.0 fL Final          Sodium Sodium   Date Value Ref Range Status   08/05/2022 144 136 - 145 mmol/L Final   08/04/2022 141 136 - 145 mmol/L " Final   08/03/2022 143 136 - 145 mmol/L Final      Potassium Potassium   Date Value Ref Range Status   08/05/2022 4.7 3.5 - 5.2 mmol/L Final     Comment:     Slight hemolysis detected by analyzer. Results may be affected.   08/04/2022 4.2 3.5 - 5.2 mmol/L Final   08/03/2022 4.5 3.5 - 5.2 mmol/L Final      Chloride Chloride   Date Value Ref Range Status   08/05/2022 102 98 - 107 mmol/L Final   08/04/2022 101 98 - 107 mmol/L Final   08/03/2022 102 98 - 107 mmol/L Final      CO2 CO2   Date Value Ref Range Status   08/05/2022 31.0 (H) 22.0 - 29.0 mmol/L Final   08/04/2022 32.0 (H) 22.0 - 29.0 mmol/L Final   08/03/2022 32.0 (H) 22.0 - 29.0 mmol/L Final      BUN BUN   Date Value Ref Range Status   08/05/2022 65 (H) 8 - 23 mg/dL Final   08/04/2022 62 (H) 8 - 23 mg/dL Final   08/03/2022 65 (H) 8 - 23 mg/dL Final      Creatinine Creatinine   Date Value Ref Range Status   08/05/2022 1.51 (H) 0.57 - 1.00 mg/dL Final   08/04/2022 1.39 (H) 0.57 - 1.00 mg/dL Final   08/03/2022 1.38 (H) 0.57 - 1.00 mg/dL Final      Calcium Calcium   Date Value Ref Range Status   08/05/2022 8.9 8.6 - 10.5 mg/dL Final   08/04/2022 8.8 8.6 - 10.5 mg/dL Final   08/03/2022 8.7 8.6 - 10.5 mg/dL Final      PO4 No results found for: CAPO4   Albumin No results found for: ALBUMIN   Magnesium No results found for: MG   Uric Acid No results found for: URICACID        Results Review:     I reviewed the patient's new clinical results.    allopurinol, 300 mg, Oral, Daily  amLODIPine, 5 mg, Oral, Daily  amoxicillin-clavulanate, 1 tablet, Oral, Daily  aspirin, 81 mg, Oral, Daily  atorvastatin, 80 mg, Oral, Nightly  bumetanide, 1 mg, Oral, BID  castor oil-balsam peru, 1 application, Topical, Q12H  Diclofenac Sodium, 2 g, Topical, 4x Daily  guaiFENesin, 600 mg, Oral, Q12H  heparin (porcine), 5,000 Units, Subcutaneous, Q12H  hydrALAZINE, 25 mg, Oral, Q8H  insulin detemir, 22 Units, Subcutaneous, Daily  insulin lispro, 0-7 Units, Subcutaneous, TID AC  Insulin Lispro,  6 Units, Subcutaneous, TID With Meals  isosorbide dinitrate, 20 mg, Oral, BID  lidocaine, 1 patch, Transdermal, Q24H  polyethylene glycol, 17 g, Oral, BID  pregabalin, 50 mg, Oral, Q12H  senna-docusate sodium, 1 tablet, Oral, BID  sodium chloride, 10 mL, Intravenous, Q12H  vitamin B-12, 100 mcg, Oral, Daily           Medication Review:     FINDINGS:   The study was limited to due to patient body habitus and limited  cooperation the right kidney has a maximal vesb-ev-fszu length of 11.2  cm. The left kidney has a maximal eskx-fc-gxog length of 12.2 cm. The  echo pattern of both kidneys is normal. There are no masses and there is  no hydronephrosis. The renal veins are patent. Resistive indices on the  right are 0.7 on the left 0.8 maximal renal flow velocities on the right  five and on the left is 13.1 cm/s. The peak systolic velocity in the  aorta is 107 cm/s. The right renal aortic ratio is 0.5 and the left 0.6.        IMPRESSION:     1. Limited study due to the patient's body habitus and lack of  cooperation.  2. Elevated resistive indices which may reflect underlying intrinsic  renal disease.  3. No evidence of obstructive uropathy or significant renal artery  stenosis.      Assessment & Plan       Weakness    PVD (peripheral vascular disease) ([facility])    Essential hypertension    CKD (chronic kidney disease), stage III ([facility])    Mitral valve disease    NICM (nonischemic cardiomyopathy) ([facility])    Coronary artery disease involving native coronary artery of native heart without angina pectoris    Dyslipidemia    Chronic combined systolic and diastolic heart failure ([facility])    Anemia    Fall    Dizziness    Acoustic neuroma ([facility])    Elevated troponin    CHF exacerbation ([facility])    Elevated serum creatinine      1- LACEY - non oliguric - Scr 1.9 at presentation - Resolved. FeUrea- 31% suggestive of pre-renal etiology. Renal duplex no evidence of significant QUEENIE. resolved  2- CKD stage III -baseline Scr 1.3-1.5 range. 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 Not a candidate for SGLT 2 inhibitor.   5- mild hyponatremia - resolved.   6- Anemia - tsat 12%   7- DM type II - not controlled A1c 9.0%- on insulin.   8- Volume overload - improving.   9- low albumin level      Plan    - Continue with bumex 1 mg BID for optimization of volume status. Will need strict I/O to determine response to diuretics.   - Fluid restriction 1.2 lit/day   - Oral iron supplement   - Renal diet.   - Adjust meds per renal function   - No emergent need of RRT.        Cecil Neff MD  08/05/22  19:09 EDT

## 2022-08-05 NOTE — PROGRESS NOTES
INFECTIOUS DISEASE  Progress Note    Susan Anderson  1940  [id number]    Admission Date: 7/26/2022      Requesting Provider: Brigid Seo MD  Evaluating Physician: Stef Pete MD    Reason for Consultation: Bacteremia    History of present illness:    7/29/22: Patient is a 82 y.o. female with h/o T2DM, CHF, CKD Stage III, HTN, chronic BLE edema, chronic right lymphedema, ELIUD/nocturnal 2L O2 NC, acoustic neuroma, CAD, T2DM, gout, HLD, asthma, PVD/right great toe TMA 4/2021/now on oral Augmentin therapy for 6 to 12 weeks from 5/19/22, cervical cancer/hysterectomy, and chronic mixed diastolic/systolic CHF, and NICM with EF 40-45% who we were asked to see for MRSA bacteremia.  The patient presented to [facility] ED on 7/26 after falling at home and pinned between the toilet and her wheelchair.  She lives alone and is unsure what happened, but states that she did not lose consciousness.  She had episode of severe diarrhea before and after falling.  Her daughter found her on the floor and called EMS.  She developed a spasm in her left lower back radiating to her hip and LLE making it difficult to get her up off the floor.  He also scraped her right knee.  Her bumex was recently doubled for 3 days for BLE edema.  She is not very mobile or independent requiring wheelchair for mobilization and requires assistance for showering.  She denies fever, chills, shortness of breath, nausea, vomiting, diarrhea, or dysuria.  She has had some nasal congestion.  On arrival to ED, her Tlow was 93.9 and her HR was bradycardic.  Admitting labs were A1C 9.0, WBC 6300 with 81% segs/2% bands, , creatinine 1.8 (baseline around 1.2-1.4), PCT 0.09, lactic acid 1.1, and UA WBC 0-2.  Blood cultures are positive in 2 of 2 sets (4 of 4 bottles) with GPC and BCID positive for MRSA and CoNS.  A COVID-19/Flu PCR was negative.  Imaging showed no evidence of acute fractures.  She had some swelling of right side of periorbital/cheek soft  tissue, chronic right maxillary sinusitis, mild pulmonary edema, subcutaneous edema of right chest and lateral abdominal wall with small volume ascites similar to 7/13/22, and prepatellar soft tissue swelling c/w hematoma or bursitis.  A CT scan of head showed no bleeds. She is currently on Vancomycin.  ID was asked to evaluate and manage her antibiotic therapy.    Her daughter indicates that she was lying on the floor at home for least 10 hours, unable to arise.  Her usual caretaker did not come to the house that day.    7/30/22: She has remained afebrile. Repeat blood cultures from 7/29 are pending.  Right prepatellar aspirate Gram stain revealed rare white blood cells with no organisms seen. Blood cultures from 726 have grown Staph epidermidis in both sets and MRSA in 1 set. Echocardiogram yesterday revealed mild mitral regurgitation.  She continues to complain of severe right knee pain.  Orthopedics has ordered an MRI scan.    8/1/22: She has remained afebrile. Follow-up blood cultures from 7/29 at been negative.  Right prepatellar aspirate culture is negative so far.  MRI scan of the right knee Revealed a moderate knee effusion which was nonspecific.  She had diffuse circumferential soft tissue swelling with isointense prepatellar collection consistent with hematoma versus infectious bursitis versus posttraumatic.  She was also noted to have complex degenerative tearing of the menisci.  She had an indistinct anterior cruciate ligament. White blood cell count today is 5.8.  Creatinine is 1.43.  She feels better today.  She has some improvement in her right knee pain.     8/2/22: She has remained afebrile.  She still complains of right knee pain.  She was able to get out of bed with assistance yesterday. Her creatinine today is 1.34.  Her white blood cell count is 4.7. Overall, she feels somewhat better.    8/3/22: She has remained afebrile.  She is on oral Augmentin. She denies nausea or vomiting.  She has some  decreased right knee pain.    8/4/22: She remains afebrile. She feels somewhat better today.  She has decreased right knee pain.  She complains of fatigue.    8/5/22: She has remained afebrile.  She has decreased right knee pain.  She is awaiting transfer to rehab.  She complains of an exacerbation of her chronic lumbar back pain.  She is more drowsy today, apparently after pain medications.        Past Medical History:   Diagnosis Date   • Arthritis    • Asthma 6/4 - double pneumonia    Currently on inhaler and nebulizer   • Cancer (HCC)     cervical cancer, skin cancer   • CHF (congestive heart failure) (Formerly [facility]) June 4, 2021   • Chronic kidney disease Related to diabetes   • Coronary artery disease 6/4/2021 DX for hear failure   • Diabetes mellitus (Former[facility]) 30 years    Seeing Dr. Lam 1st time Aug 19   • Gout    • Hx of colonoscopy    • Hyperlipidemia Reference current labs x 2-3yrs approx   • Hypertension 30 years   • Migraine    • Mitral valve disease    • Mitral valve disease    • Osteomyelitis ([facility])    • Peripheral neuropathy    • Sleep apnea    • Type 2 diabetes mellitus ([facility])     30 years       Past Surgical History:   Procedure Laterality Date   • ABDOMINAL HYSTERECTOMY W/SALPINGECTOMY     • AMPUTATION  Right great toe, 1st 1/3 metatarsal -[place] 4/14/21   • AORTAGRAM N/A 4/9/2021    Procedure: AORTAGRAM WITH OR WITHOUT RUNOFFS, WITH Co2;  Surgeon: Trey Forrest MD;  Location: [facility];  Service: Vascular;  Laterality: N/A;   • APPENDECTOMY     • BRAIN TUMOR EXCISION      laser surgery    • CARDIAC CATHETERIZATION N/A 6/21/2021    Procedure: LEFT HEART CATH;  Surgeon: Anjum Ceballos MD;  Location:  [facility];  Service: Cardiology;  Laterality: N/A;   • CATARACT EXTRACTION W/ INTRAOCULAR LENS  IMPLANT, BILATERAL     • CHOLECYSTECTOMY     • EYE SURGERY     • HYSTERECTOMY     • TOE SURGERY     • TRANS METATARSAL AMPUTATION Right 4/14/2021    Procedure: AMPUTATION TRANS METATARSAL RIGHT  GREAT TOE;  Surgeon: Valdez Finney MD;  Location: Replaced by [facility];  Service: Vascular;  Laterality: Right;       Family History   Problem Relation Age of Onset   • Diabetes Mother         Type II   • Heart disease Father    • Heart attack Father    • Coronary artery disease Father    • Diabetes Father         Type II   • Diabetes Sister    • Diabetes Maternal Grandmother    • Diabetes Maternal Grandfather    • Diabetes Paternal Grandmother    • Diabetes Paternal Grandfather        Social History     Socioeconomic History   • Marital status:    • Number of children: 1   Tobacco Use   • Smoking status: Never Smoker   • Smokeless tobacco: Never Used   Vaping Use   • Vaping Use: Never used   Substance and Sexual Activity   • Alcohol use: Yes     Comment: Social drinking when not recovering from hospitalization   • Drug use: Never   • Sexual activity: Not Currently     Birth control/protection: None       Allergies   Allergen Reactions   • Cephalexin Nausea Only   • Erythromycin Base Nausea Only   • Melatonin Other (See Comments)     Nightmares   • Oxycodone Nausea Only   • Sulfa Antibiotics Nausea Only         Medication:    Current Facility-Administered Medications:   •  acetaminophen (TYLENOL) tablet 650 mg, 650 mg, Oral, Q4H PRN, 650 mg at 08/03/22 0813 **OR** [DISCONTINUED] acetaminophen (TYLENOL) 160 MG/5ML solution 650 mg, 650 mg, Oral, Q4H PRN **OR** [DISCONTINUED] acetaminophen (TYLENOL) suppository 650 mg, 650 mg, Rectal, Q4H PRN, Breana, Chey G, DO  •  allopurinol (ZYLOPRIM) tablet 300 mg, 300 mg, Oral, Daily, Breana, Chey G, DO, 300 mg at 08/04/22 0826  •  amLODIPine (NORVASC) tablet 5 mg, 5 mg, Oral, Daily, Breana, Chey G, DO, 5 mg at 08/04/22 0827  •  amoxicillin-clavulanate (AUGMENTIN) 875-125 MG per tablet 1 tablet, 1 tablet, Oral, Daily, Stef Pete MD, 1 tablet at 08/04/22 0826  •  aspirin EC tablet 81 mg, 81 mg, Oral, Daily, Breana, Chey G, DO, 81 mg at 08/04/22 0826  •  atorvastatin  (LIPITOR) tablet 80 mg, 80 mg, Oral, Nightly, Anna Crystal PA-C, 80 mg at 08/04/22 2110  •  baclofen (LIORESAL) tablet 5 mg, 5 mg, Oral, BID PRN, Anna Crystal PA-C  •  bisacodyl (DULCOLAX) EC tablet 10 mg, 10 mg, Oral, Daily PRN, Anna Crystal PA-C, 10 mg at 08/02/22 0821  •  bisacodyl (DULCOLAX) suppository 10 mg, 10 mg, Rectal, Daily PRN, Anna Crystal PA-C  •  bumetanide (BUMEX) tablet 1 mg, 1 mg, Oral, BID, Anna Crystal PA-C, 1 mg at 08/04/22 1801  •  castor oil-balsam peru (VENELEX) ointment 1 application, 1 application, Topical, Q12H, Brigid Seo MD, 1 application at 08/04/22 2111  •  dextrose (D50W) (25 g/50 mL) IV injection 25 g, 25 g, Intravenous, Q15 Min PRN, Breana, Chey G, DO  •  dextrose (GLUTOSE) oral gel 15 g, 15 g, Oral, Q15 Min PRN, Breana Chey G, DO  •  diazePAM (VALIUM) tablet 4 mg, 4 mg, Oral, Nightly PRN, Anna Crystal PA-C  •  Diclofenac Sodium (VOLTAREN) 1 % gel 2 g, 2 g, Topical, 4x Daily, Brigid Seo MD, 2 g at 08/04/22 2111  •  glucagon (human recombinant) (GLUCAGEN DIAGNOSTIC) injection 1 mg, 1 mg, Intramuscular, Q15 Min PRN, Breana, Chey G, DO  •  guaiFENesin (MUCINEX) 12 hr tablet 600 mg, 600 mg, Oral, Q12H, Anna Crystal PA-C, 600 mg at 08/04/22 2111  •  heparin (porcine) 5000 UNIT/ML injection 5,000 Units, 5,000 Units, Subcutaneous, Q12H, BreanaMini hawthornesie G, DO, 5,000 Units at 08/04/22 2110  •  hydrALAZINE (APRESOLINE) tablet 25 mg, 25 mg, Oral, Q8H, Ruddy Guerrier, , 25 mg at 08/05/22 0529  •  insulin detemir (LEVEMIR) injection 22 Units, 22 Units, Subcutaneous, Daily, Anna Crystal PA-C, 22 Units at 08/04/22 0822  •  Insulin Lispro (humaLOG) injection 0-7 Units, 0-7 Units, Subcutaneous, TID AC, Anna Crystal PA-C, 2 Units at 08/04/22 1757  •  Insulin Lispro (humaLOG) injection 6 Units, 6 Units, Subcutaneous, TID With Meals, Anna Crystal PA-C, 6 Units at 08/04/22 1757  •  isosorbide  dinitrate (ISORDIL) tablet 20 mg, 20 mg, Oral, BID, Breana, Chey G, DO, 20 mg at 08/04/22 2110  •  lidocaine (LIDODERM) 5 % 1 patch, 1 patch, Transdermal, Q24H, Breana, Chey G, DO, 1 patch at 08/04/22 0828  •  magnesium hydroxide (MILK OF MAGNESIA) suspension 10 mL, 10 mL, Oral, Daily PRN, Anna Crystal PA-C, 10 mL at 08/02/22 0820  •  ondansetron (ZOFRAN) tablet 4 mg, 4 mg, Oral, Q6H PRN **OR** ondansetron (ZOFRAN) injection 4 mg, 4 mg, Intravenous, Q6H PRN, Breana, Chey G, DO, 4 mg at 08/02/22 0106  •  oxyCODONE (ROXICODONE) immediate release tablet 2.5 mg, 2.5 mg, Oral, Q6H PRN, Anna Crystal PA-C  •  polyethylene glycol (MIRALAX) packet 17 g, 17 g, Oral, BID, Anna Crystal PA-C, 17 g at 08/04/22 1757  •  pregabalin (LYRICA) capsule 50 mg, 50 mg, Oral, Q12H, Breana, Chey G, DO, 50 mg at 08/04/22 2110  •  sennosides-docusate (PERICOLACE) 8.6-50 MG per tablet 1 tablet, 1 tablet, Oral, BID, Ruddy Guerrier, DO, 1 tablet at 08/04/22 2110  •  [COMPLETED] Insert peripheral IV, , , Once **AND** sodium chloride 0.9 % flush 10 mL, 10 mL, Intravenous, PRN, Breana, Chey G, DO  •  sodium chloride 0.9 % flush 10 mL, 10 mL, Intravenous, Q12H, Breana, Chey G, DO, 10 mL at 08/04/22 2110  •  sodium chloride 0.9 % flush 10 mL, 10 mL, Intravenous, PRN, Breana, Chey G, DO  •  vitamin B-12 (CYANOCOBALAMIN) tablet 100 mcg, 100 mcg, Oral, Daily, Breana, Chey G, DO, 100 mcg at 08/04/22 0827    Antibiotics:  Anti-Infectives (From admission, onward)    Ordered     Dose/Rate Route Frequency Start Stop    08/01/22 1229  amoxicillin-clavulanate (AUGMENTIN) 875-125 MG per tablet 1 tablet        Ordering Provider: Stef Pete MD    1 tablet Oral Daily 08/01/22 1315 08/31/22 0859    07/28/22 0859  vancomycin (VANCOCIN) 1000 mg/200 mL dextrose 5% IVPB        Ordering Provider: Tom Phelan, PharmD    10 mg/kg × 104 kg  over 60 Minutes Intravenous Once 07/28/22 1000 07/28/22 1028    07/27/22 1829   vancomycin 1750 mg/500 mL 0.9% NS IVPB ([facility])        Ordering Provider: Adalid Mcrae RPH    20 mg/kg × 90.7 kg  over 105 Minutes Intravenous Once 22            Review of Systems:  See HPI    Physical Exam:   Vital Signs  Temp (24hrs), Av.6 °F (36.4 °C), Min:97.4 °F (36.3 °C), Max:97.8 °F (36.6 °C)    Temp  Min: 97.4 °F (36.3 °C)  Max: 97.8 °F (36.6 °C)  BP  Min: 139/61  Max: 159/78  Pulse  Min: 63  Max: 77  Resp  Min: 16  Max: 18  SpO2  Min: 91 %  Max: 97 %    GENERAL: She is drowsy but in no acute distress  HEENT: Normocephalic, atraumatic.  PERRL. EOMI. No conjunctival injection. No icterus. Oropharynx clear without evidence of thrush or exudate.   NECK: Supple   HEART: RRR; No murmur, rubs, gallops.   LUNGS: Clear to auscultation bilaterally without wheezing, rales, rhonchi. Normal respiratory effort. Nonlabored. No dullness.  ABDOMEN: Soft, nontender, nondistended. No rebound or guarding. NO mass or HSM.  EXT:  No cyanosis, clubbing or edema. No cord.  :  Without Bui catheter.  MSK: Her right pretibial wound is 3-4 cm in diameter with some residual slough but no cellulitis.  There is decreasedprepatellar swelling secondary to hematoma.  SKIN: Warm and dry without cutaneous eruptions on Inspection/palpation.    NEURO: Drowsy but arousable.  She moves all of her extremities.    Laboratory Data    Results from last 7 days   Lab Units 22  0701 22  0708 22  0525   WBC 10*3/mm3 4.72 5.77 5.32   HEMOGLOBIN g/dL 9.7* 9.5* 9.1*   HEMATOCRIT % 30.7* 29.5* 27.1*   PLATELETS 10*3/mm3 147 188 184     Results from last 7 days   Lab Units 22  0812   SODIUM mmol/L 141   POTASSIUM mmol/L 4.2   CHLORIDE mmol/L 101   CO2 mmol/L 32.0*   BUN mg/dL 62*   CREATININE mg/dL 1.39*   GLUCOSE mg/dL 168*   CALCIUM mg/dL 8.8     Results from last 7 days   Lab Units 22  0525   ALK PHOS U/L 197*   BILIRUBIN mg/dL 0.7   ALT (SGPT) U/L 24   AST (SGOT) U/L 29                 Results  from last 7 days   Lab Units 07/31/22  1647 07/29/22  1456   CK TOTAL U/L 344* 334*         Estimated Creatinine Clearance: 34.3 mL/min (A) (by C-G formula based on SCr of 1.39 mg/dL (H)).      Microbiology:  Microbiology Results (last 10 days)     Procedure Component Value - Date/Time    Eosinophil Smear - Urine, Urine, Clean Catch [[id number]]  (Normal) Collected: 07/31/22 1503    Lab Status: Final result Specimen: Urine, Clean Catch Updated: 07/31/22 1627     Eosinophil Smear 0 % EOS/100 Cells     Narrative:      No eosinophil seen    Blood Culture - Blood, Arm, Right [[id number]]  (Normal) Collected: 07/29/22 1456    Lab Status: Final result Specimen: Blood from Arm, Right Updated: 08/03/22 1517     Blood Culture No growth at 5 days    Blood Culture - Blood, Hand, Left [[id number]]  (Normal) Collected: 07/29/22 1456    Lab Status: Final result Specimen: Blood from Hand, Left Updated: 08/03/22 1517     Blood Culture No growth at 5 days    Body Fluid Culture - Body Fluid, Knee, Right [[id number]] Collected: 07/29/22 1400    Lab Status: Final result Specimen: Body Fluid from Knee, Right Updated: 08/03/22 1009     Body Fluid Culture No growth at 5 days     Gram Stain Rare (1+) WBCs seen      No organisms seen    COVID PRE-OP / PRE-PROCEDURE SCREENING ORDER (NO ISOLATION) - Swab, Nasopharynx [[id number]]  (Normal) Collected: 07/26/22 2300    Lab Status: Final result Specimen: Swab from Nasopharynx Updated: 07/26/22 2340    Narrative:      The following orders were created for panel order COVID PRE-OP / PRE-PROCEDURE SCREENING ORDER (NO ISOLATION) - Swab, Nasopharynx.  Procedure                               Abnormality         Status                     ---------                               -----------         ------                     COVID-19 and FLU A/B PCR...[[id number]]  Normal              Final result                 Please view results for these tests on the individual orders.    Blood Culture - Blood, Arm, Left  [[id number]]  (Abnormal)  (Susceptibility) Collected: 07/26/22 2300    Lab Status: Edited Result - FINAL Specimen: Blood from Arm, Left Updated: 07/30/22 0819     Blood Culture Staphylococcus aureus, MRSA     Comment: Infectious disease consultation is highly recommended to rule out distant foci of infection.  Methicillin resistant Staphylococcus aureus, Patient may be an isolation risk.        Isolated from Aerobic Bottle     Blood Culture Staphylococcus epidermidis     Isolated from Aerobic and Anaerobic Bottles     Gram Stain Aerobic Bottle Gram positive cocci in groups      Anaerobic Bottle Gram positive cocci in groups    Susceptibility      Staphylococcus aureus, MRSA      NAIMA      Daptomycin Susceptible (C)  [1]       Gentamicin Susceptible      Oxacillin Resistant     Rifampin Susceptible      Vancomycin Susceptible                   [1]  Appended report. These results have been appended to a previously final verified report.             Susceptibility      Staphylococcus epidermidis      NAIMA      Oxacillin Resistant     Vancomycin Susceptible                       Susceptibility Comments     Staphylococcus aureus, MRSA    Dapto requested by Dr. Pete 7/30  This isolate does not demonstrate inducible clindamycin resistance in vitro.      Staphylococcus epidermidis    This isolate is presumed to be clindamycin resistant based on detection of inducible clindamycin resistance.  Clindamycin may still be effective in some patients.             Blood Culture - Blood, Arm, Right [[id number]]  (Abnormal) Collected: 07/26/22 2300    Lab Status: Final result Specimen: Blood from Arm, Right Updated: 07/30/22 0623     Blood Culture Staphylococcus epidermidis     Isolated from Aerobic and Anaerobic Bottles     Gram Stain Anaerobic Bottle Gram positive cocci in groups      Aerobic Bottle Gram positive cocci in groups    Narrative:      Refer to previous blood culture collected on 7/26/2022 2300 for MICs    COVID-19 and  FLU A/B PCR - Swab, Nasopharynx [[id number]]  (Normal) Collected: 07/26/22 2300    Lab Status: Final result Specimen: Swab from Nasopharynx Updated: 07/26/22 2340     COVID19 Not Detected     Influenza A PCR Not Detected     Influenza B PCR Not Detected    Narrative:      Fact sheet for providers: https://www.fda.gov/media/458388/download    Fact sheet for patients: https://www.fda.gov/media/753403/download    Test performed by PCR.    Blood Culture ID, PCR - Blood, Arm, Left [[id number]]  (Abnormal) Collected: 07/26/22 2300    Lab Status: Final result Specimen: Blood from Arm, Left Updated: 07/27/22 1811     BCID, PCR Staph aureus. mecA/C and MREJ (methicillin resistance gene) detected. Identification by BCID2 PCR.     BCID, PCR 2 Staph spp, not aureus or lugdunesis. Identification by BCID2 PCR.    Narrative:      Infectious disease consultation is highly recommended to rule out distant foci of infection.                Radiology:  XR Chest 1 View    Result Date: 8/3/2022  Congestive heart failure with moderate bibasilar effusion/atelectasis.  This report was finalized on 8/3/2022 12:17 PM by Dr. Breezy Rey MD.      MRI Knee Right Without Contrast    Result Date: 8/1/2022   No acute osseous findings.  There is a moderate knee joint effusion which is nonspecific. No obvious destructive joint space loss, discrete bony erosion, or subchondral marrow edema to suggest septic joint otherwise, however clinical correlation is required and consider joint fluid sampling as clinically indicated.  Diffuse circumferential soft tissue swelling/subcutaneous edema about the knee, including isointense prepatellar collection which may reflect posttraumatic versus infectious bursitis versus hematoma.  Moderate to severe tricompartmental osteoarthritic change. Complex degenerative tearing of the menisci.  Indistinctness of the anterior cruciate ligament, difficult to distinguish whether this reflects poor visualization from motion  degradation artifact versus mucoid degeneration or chronic tear.  This report was finalized on 8/1/2022 8:31 AM by Kings Baez MD.      Duplex Renal Artery - Bilateral Complete CAR    Result Date: 8/1/2022   1. Limited study due to the patient's body habitus and lack of cooperation. 2. Elevated resistive indices which may reflect underlying intrinsic renal disease. 3. No evidence of obstructive uropathy or significant renal artery stenosis.  This report was finalized on 8/1/2022 4:20 PM by Trey Saba MD.        Impression:   1. 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  The fact that she has 2 positive blood cultures for staph epidermis with MRSA also one of the sets suggest that the isolates all skin contaminants.  In addition, follow-up blood cultures have been negative.  She does not have any focal evidence of MRSA infection.  She is now stable off of intravenous antibiotics  2. Right prepatellar hematoma/right knee pain-her routine x-ray was negative for fracture.  MRI scan does not reveal a fracture or tendon rupture.  3. Recent onset of left pretibial ulcer  4. 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 On chronic Augmentin oral suppressive therapy for about a year now.  H/o MSSA.  5. Hypothermia, improved  6. Acute on chronic kidney disease Stage III, ATN vs rhabdomyolysis  7. Rhadomyolysis, after fall and lying on right side.  8. Right-sided edema/anasarca  9. Elevated LFTs/bilirubin related to above.   10. Chronic BLE edema/RLE lymphedema  11. Type 2 diabetes mellitus, poorly controlled  12. Essential hypertension  13. Nonischemic cardiomyopathy with EF 4-45%  14. Chronic diastolic/systolic mixed CHF  15. Acoustic neuroma  16. Cephelexin, erythromycin, and sulfa intolerances (GI intolerance).      PLAN/RECOMMENDATIONS:   1.  Continue off of intravenous antibiotic therapy  2.  Augmentin 875  mg p.o. daily for chronic suppression of right foot infection  3.  Mobilize  4.  Discharge to rehab      Stef Pete MD  8/5/2022  08:10 EDT

## 2022-08-05 NOTE — PROGRESS NOTES
[facility] Medicine Services  PROGRESS NOTE    Patient Name: Susan Anderson  : 1940  MRN: [id number]    Date of Admission: 2022  Primary Care Physician: Arleen Doherty MD    Subjective     CC: f/u weakness, constipation    HPI:  Patient sleeping in bed. Difficult to arouse and simply moans and groans when I try to do so, did not want to get up and talk to me.    ROS:  UTO due to patient unwillingness to participate     Objective     Vital Signs:   Temp:  [97.4 °F (36.3 °C)-97.8 °F (36.6 °C)] 97.4 °F (36.3 °C)  Heart Rate:  [63-89] 89  Resp:  [16-18] 17  BP: (139-159)/(61-97) 156/74  Flow (L/min):  [2-2.5] 2.5     Physical Exam:  Constitutional: No acute distress, sleeping  HENT: NCAT, mucous membranes moist  Respiratory: 87% on room air, non-labored, scattered crackles, poor air movement  Cardiovascular: RRR, no murmurs, rubs, or gallops  Gastrointestinal: Positive bowel sounds, soft, nontender, nondistended  Musculoskeletal: No bilateral ankle edema  Psychiatric: drowsy  Neurologic: drowsy, moans/groans  Skin: No rashes      Results Reviewed:  LAB RESULTS:      Lab 22  0752 22  0701 22  0708 22  0525 22  0743 22  1456   WBC 6.16 4.72 5.77 5.32 5.34 5.82   HEMOGLOBIN 9.3* 9.7* 9.5* 9.1* 8.8* 9.6*   HEMATOCRIT 28.8* 30.7* 29.5* 27.1* 27.5* 29.2*   PLATELETS 200 147 188 184 160 186   NEUTROS ABS  --   --   --   --   --  4.06   IMMATURE GRANS (ABS)  --   --   --   --   --  0.19*   LYMPHS ABS  --   --   --   --   --  0.86   MONOS ABS  --   --   --   --   --  0.61   EOS ABS  --   --   --   --   --  0.08   MCV 92.6 91.1 91.6 90.3 90.5 90.4         Lab 22  0752 22  0812 22  0854 22  0701 22  0708 22  1647   SODIUM 144 141 143 143 142  --    POTASSIUM 4.7 4.2 4.5 4.6 4.2  --    CHLORIDE 102 101 102 104 103  --    CO2 31.0* 32.0* 32.0* 31.0* 32.0*  --    ANION GAP 11.0 8.0 9.0 8.0 7.0  --    BUN 65* 62* 65*  65* 71*  --    CREATININE 1.51* 1.39* 1.38* 1.34* 1.43*  --    EGFR 34.2* 37.7* 38.1* 39.4* 36.5*  --    GLUCOSE 106* 168* 163* 167* 90  --    CALCIUM 8.9 8.8 8.7 9.2 9.0  --    PHOSPHORUS  --   --   --   --   --  4.2         Lab 07/31/22  0525   TOTAL PROTEIN 5.1*   ALBUMIN 2.80*   GLOBULIN 2.3   ALT (SGPT) 24   AST (SGOT) 29   BILIRUBIN 0.7   ALK PHOS 197*         Lab 08/03/22  0854   PROBNP 3,452.0*     Brief Urine Lab Results  (Last result in the past 365 days)      Color   Clarity   Blood   Leuk Est   Nitrite   Protein   CREAT   Urine HCG        07/31/22 1503             27.0             Microbiology Results Abnormal     Procedure Component Value - Date/Time    Blood Culture - Blood, Arm, Right [[id number]]  (Normal) Collected: 07/29/22 1456    Lab Status: Final result Specimen: Blood from Arm, Right Updated: 08/03/22 1517     Blood Culture No growth at 5 days    Blood Culture - Blood, Hand, Left [[id number]]  (Normal) Collected: 07/29/22 1456    Lab Status: Final result Specimen: Blood from Hand, Left Updated: 08/03/22 1517     Blood Culture No growth at 5 days    Body Fluid Culture - Body Fluid, Knee, Right [[id number]] Collected: 07/29/22 1400    Lab Status: Final result Specimen: Body Fluid from Knee, Right Updated: 08/03/22 1009     Body Fluid Culture No growth at 5 days     Gram Stain Rare (1+) WBCs seen      No organisms seen    Eosinophil Smear - Urine, Urine, Clean Catch [[id number]]  (Normal) Collected: 07/31/22 1503    Lab Status: Final result Specimen: Urine, Clean Catch Updated: 07/31/22 1627     Eosinophil Smear 0 % EOS/100 Cells     Narrative:      No eosinophil seen    COVID PRE-OP / PRE-PROCEDURE SCREENING ORDER (NO ISOLATION) - Swab, Nasopharynx [[id number]]  (Normal) Collected: 07/26/22 2300    Lab Status: Final result Specimen: Swab from Nasopharynx Updated: 07/26/22 2340    Narrative:      The following orders were created for panel order COVID PRE-OP / PRE-PROCEDURE SCREENING ORDER (NO  ISOLATION) - Swab, Nasopharynx.  Procedure                               Abnormality         Status                     ---------                               -----------         ------                     COVID-19 and FLU A/B PCR...[[id number]]  Normal              Final result                 Please view results for these tests on the individual orders.    COVID-19 and FLU A/B PCR - Swab, Nasopharynx [[id number]]  (Normal) Collected: 07/26/22 2300    Lab Status: Final result Specimen: Swab from Nasopharynx Updated: 07/26/22 2340     COVID19 Not Detected     Influenza A PCR Not Detected     Influenza B PCR Not Detected    Narrative:      Fact sheet for providers: https://www.fda.gov/media/577321/download    Fact sheet for patients: https://www.fda.gov/media/565418/download    Test performed by PCR.        XR Chest 1 View    Result Date: 8/3/2022  DATE OF EXAM: 8/3/2022 11:30 AM  PROCEDURE: XR CHEST 1 VW-  INDICATIONS: hypoxia; R53.1-Weakness; W19.XXXA-Unspecified fall, initial encounter; R74.8-Abnormal levels of other serum enzymes; R77.8-Other specified abnormalities of plasma proteins; N18.9-Chronic kidney disease, unspecified; R73.9-Hyperglycemia, unspecified  COMPARISON: 7/26/2022  TECHNIQUE: Single radiographic AP view of the chest was obtained.  FINDINGS: The heart is enlarged and there is now extensive pulmonary interstitial edema, with moderate bibasilar effusion and atelectasis. No pneumothorax is seen. Appearance is consistent with congestive heart failure.      Impression: Congestive heart failure with moderate bibasilar effusion/atelectasis.  This report was finalized on 8/3/2022 12:17 PM by Dr. Breezy Rey MD.      Results for orders placed during the hospital encounter of 07/26/22    Adult Transthoracic Echo Complete W/ Cont if Necessary Per Protocol    Interpretation Summary  · Normal left ventricular systolic function, estimated EF 55%.  · Aortic sclerosis without aortic stenosis or regurgitation.  ·  Mild mitral regurgitation.    I have reviewed the medications:  Scheduled Meds:allopurinol, 300 mg, Oral, Daily  amLODIPine, 5 mg, Oral, Daily  amoxicillin-clavulanate, 1 tablet, Oral, Daily  aspirin, 81 mg, Oral, Daily  atorvastatin, 80 mg, Oral, Nightly  bumetanide, 1 mg, Oral, BID  castor oil-balsam peru, 1 application, Topical, Q12H  Diclofenac Sodium, 2 g, Topical, 4x Daily  guaiFENesin, 600 mg, Oral, Q12H  heparin (porcine), 5,000 Units, Subcutaneous, Q12H  hydrALAZINE, 25 mg, Oral, Q8H  insulin detemir, 22 Units, Subcutaneous, Daily  insulin lispro, 0-7 Units, Subcutaneous, TID AC  Insulin Lispro, 6 Units, Subcutaneous, TID With Meals  isosorbide dinitrate, 20 mg, Oral, BID  lidocaine, 1 patch, Transdermal, Q24H  polyethylene glycol, 17 g, Oral, BID  pregabalin, 50 mg, Oral, Q12H  senna-docusate sodium, 1 tablet, Oral, BID  sodium chloride, 10 mL, Intravenous, Q12H  vitamin B-12, 100 mcg, Oral, Daily      Continuous Infusions:   PRN Meds:.•  acetaminophen **OR** [DISCONTINUED] acetaminophen **OR** [DISCONTINUED] acetaminophen  •  baclofen  •  bisacodyl  •  bisacodyl  •  dextrose  •  dextrose  •  diazePAM  •  glucagon (human recombinant)  •  magnesium hydroxide  •  ondansetron **OR** ondansetron  •  oxyCODONE  •  [COMPLETED] Insert peripheral IV **AND** sodium chloride  •  sodium chloride    Assessment & Plan     Active Hospital Problems    Diagnosis  POA   • **Weakness [R53.1]  Yes   • Anemia [D64.9]  Unknown   • Fall [W19.XXXA]  Unknown   • Dizziness [R42]  Unknown   • Acoustic neuroma (HCC) [D33.3]  Unknown   • Elevated troponin [R77.8]  Unknown   • CHF exacerbation (HCC) [I50.9]  Unknown   • Elevated serum creatinine [R79.89]  Unknown   • NICM (nonischemic cardiomyopathy) ([facility]) [I42.8]  Yes   • Coronary artery disease involving native coronary artery of native heart without angina pectoris [I25.10]  Yes   • Dyslipidemia [E78.5]  Yes   • Chronic combined systolic and diastolic heart failure (HCC) [I50.42]   Yes   • Mitral valve disease [I05.9]  Yes   • PVD (peripheral vascular disease) ([facility]) [I73.9]  Yes   • Essential hypertension [I10]  Yes   • CKD (chronic kidney disease), stage III ([facility]) [N18.30]  Yes      Resolved Hospital Problems   No resolved problems to display.     Brief Hospital Course to date:  Susan Anderson is a 82 y.o. female with PMH significant for HTN, HLD, CAD, non-ischemic cardiomyopathy, chronic combined systolic/diastolic CHF, CKD III, PVD, insulin-dependent DMII, ELIUD (on 2L NC QHS, chronic BLE edema, and acoustic neuroma. She was admitted to [facility] 7/26/22 for mild rhabdomyolysis after a fall at home. Also found to have a/c CHF exacerbation and LACEY.       This patient's hospital course, problems, and plans entered by my partner were reviewed and updated as appropriate by me on 8/5/2022      Fall at home  Generalized weakness  - Fall at home in bathroom when she opted not to wait for her bath aide to help her  - Cardiology has evaluated and feel her falls are likely mechanical and not cardiac. May have some orthostasis. Patient does report she feels better with SBP in 150's, so allowing some permissive hypertension   - PT/OT following - [facility] accepted pending medical readiness     Rhabdomyolysis, resolved   -  on admission, s/p IV fluids     Acute on chronic combined systolic/diastolic CHF  Nonischemic cardiomyopathy   LACEY on CKD III - baseline 1.3-1.5  - Appreciate nephrology assistance  - 8/1 renal artery duplex suggestive of intrinsic disease but no significant renal artery stenosis or obstructive uropathy   - Creatinine 1.9 on admission, has since returned to baseline  - CXR 8/3 showed congestive heart failure with moderate bibasilar effusion/atelectasis  - Nephrology diuresing, now on Bumex 1mg BID  - s/p IV albumin   - Fluid restriction per nephrology     Blood culture (+) MRSA, suspected contaminant   - 7/26 - 2 of 2 blood cultures (+) staph epidermis, 1 of 2  positive for MRSA  - Repeat blood cultures on 7/31 NGTD  - Appreciate ID assistance. No source of infection has been identified.  - Discussed with ID on 8/1, suspect contaminant. Antibiotics stopped    Right knee effusion   - Orthopedic surgery has evaluated  - Joint aspiration not consistent with infection. Culture NGTD  - MRI of R knee does not suggest structural injury to the knee  - WBAT. Knee immobilizer on for comfort  - Follow up with Dr. Pearce in 2-3 weeks     Low back spasms   - PRN Robaxin ineffective per patient  - Seems oversedated  - Changed Baclofen to 5mg BID PRN  - Continue Oxycodone 2.5 but decrease to Q6H PRN  - Change Valium 4mg QHS PRN only     Chronic anemia   - PO iron supplement     Insulin-dependent DMII   Diabetic peripheral neuropathy  - Hgb A1c 9.0%  - Continue Levemir 22 units daily and Lispro 6 units TID AC + SSI  - Continue Lyrica 50mg BID     History of R great toe osteomyelitis  - s/p TMA 4/2021 by Dr. Finney.   - Culture (+) MSSA  - Has been on chronic suppressive Augmentin for ~1 year  - Back on Augmentin per ID     Dizziness  Acoustic neuroma, chronic  - ?contributing to falls  - Had initial w/u for neuroma at [facility]  - Has not had recent follow up imaging, consider MRI with/without contrast if GFR continues to improve prior to DC     Essential hypertension  - Beta blocker stopped due to bradycardia  - Restarted Lipitor now that Daptomycin has been stopped  - Continue Amlodipine 5mg daily, Bumex 1mg BID, Hydralazine 25mg TID, Isordil 20mg TID,      Constipation  - Continue Miralax / Senna  - Make Miralax BID  - Give Dulcolax 10mg and Milk of Mag 10mg now     Expected Discharge Location and Transportation: [facility] via EMS or medical transport van   Expected Discharge Date: 8/6/22 or later pending volume status / mental status and creatinine     DVT prophylaxis:Medical DVT prophylaxis orders are present.     AM-PAC 6 Clicks Score (PT): 10 (08/05/22 0800)    CODE STATUS:   Code Status  and Medical Interventions:   Ordered at: 07/27/22 0143     Code Status (Patient has no pulse and is not breathing):    CPR (Attempt to Resuscitate)     Medical Interventions (Patient has pulse or is breathing):    Full Support     Kathy Rodríguez, DO  08/05/22

## 2022-08-05 NOTE — PLAN OF CARE
Goal Outcome Evaluation:  Plan of Care Reviewed With: patient [id number]&OX4. VSS SR on tele. Pt reports ongoing pain in right knee. Pain meds given.Placed in speciality bed and positioned by staff. Awaiting placement

## 2022-08-05 NOTE — CASE MANAGEMENT/SOCIAL WORK
Continued Stay Note  [facility]     Patient Name: Susan Anderson  MRN: [id number]  Today's Date: 8/5/2022    Admit Date: 7/26/2022     Discharge Plan     Row Name 08/05/22 0947       Plan    Plan [facility]    Plan Comments [facility] had had a private subacute bed open up but not currently ready to transfer. Delmi with [facility] will follow up on Monday, patient's insuance will also need to be updated for [facility]. I will update patient/her daughter.    Final Discharge Disposition Code 03 - skilled nursing facility (SNF)               Discharge Codes    No documentation.                     Sheron Somers RN

## 2022-08-06 ENCOUNTER — APPOINTMENT (OUTPATIENT)
Dept: MRI IMAGING | Facility: HOSPITAL | Age: 82
End: 2022-08-06

## 2022-08-06 ENCOUNTER — APPOINTMENT (OUTPATIENT)
Dept: GENERAL RADIOLOGY | Facility: HOSPITAL | Age: 82
End: 2022-08-06

## 2022-08-06 LAB
ANION GAP SERPL CALCULATED.3IONS-SCNC: 12 MMOL/L (ref 5–15)
BUN SERPL-MCNC: 62 MG/DL (ref 8–23)
BUN/CREAT SERPL: 50 (ref 7–25)
CALCIUM SPEC-SCNC: 9.2 MG/DL (ref 8.6–10.5)
CHLORIDE SERPL-SCNC: 102 MMOL/L (ref 98–107)
CO2 SERPL-SCNC: 29 MMOL/L (ref 22–29)
CREAT SERPL-MCNC: 1.24 MG/DL (ref 0.57–1)
EGFRCR SERPLBLD CKD-EPI 2021: 43.3 ML/MIN/1.73
GLUCOSE BLDC GLUCOMTR-MCNC: 114 MG/DL (ref 70–130)
GLUCOSE BLDC GLUCOMTR-MCNC: 118 MG/DL (ref 70–130)
GLUCOSE BLDC GLUCOMTR-MCNC: 178 MG/DL (ref 70–130)
GLUCOSE BLDC GLUCOMTR-MCNC: 183 MG/DL (ref 70–130)
GLUCOSE SERPL-MCNC: 197 MG/DL (ref 65–99)
POTASSIUM SERPL-SCNC: 4.5 MMOL/L (ref 3.5–5.2)
SODIUM SERPL-SCNC: 143 MMOL/L (ref 136–145)

## 2022-08-06 PROCEDURE — 25010000002 HEPARIN (PORCINE) PER 1000 UNITS: Performed by: INTERNAL MEDICINE

## 2022-08-06 PROCEDURE — 72148 MRI LUMBAR SPINE W/O DYE: CPT

## 2022-08-06 PROCEDURE — 63710000001 INSULIN LISPRO (HUMAN) PER 5 UNITS: Performed by: PHYSICIAN ASSISTANT

## 2022-08-06 PROCEDURE — 82962 GLUCOSE BLOOD TEST: CPT

## 2022-08-06 PROCEDURE — 71045 X-RAY EXAM CHEST 1 VIEW: CPT

## 2022-08-06 PROCEDURE — 99231 SBSQ HOSP IP/OBS SF/LOW 25: CPT | Performed by: NURSE PRACTITIONER

## 2022-08-06 PROCEDURE — 80048 BASIC METABOLIC PNL TOTAL CA: CPT | Performed by: INTERNAL MEDICINE

## 2022-08-06 PROCEDURE — 63710000001 INSULIN DETEMIR PER 5 UNITS: Performed by: PHYSICIAN ASSISTANT

## 2022-08-06 RX ORDER — DIAZEPAM 2 MG/1
4 TABLET ORAL EVERY 12 HOURS PRN
Status: DISCONTINUED | OUTPATIENT
Start: 2022-08-06 | End: 2022-08-09

## 2022-08-06 RX ORDER — BUMETANIDE 2 MG/1
2 TABLET ORAL
Status: DISCONTINUED | OUTPATIENT
Start: 2022-08-06 | End: 2022-08-08

## 2022-08-06 RX ORDER — CYCLOBENZAPRINE HCL 10 MG
5 TABLET ORAL 3 TIMES DAILY PRN
Status: DISCONTINUED | OUTPATIENT
Start: 2022-08-06 | End: 2022-08-12 | Stop reason: HOSPADM

## 2022-08-06 RX ADMIN — INSULIN LISPRO 2 UNITS: 100 INJECTION, SOLUTION INTRAVENOUS; SUBCUTANEOUS at 12:56

## 2022-08-06 RX ADMIN — POLYETHYLENE GLYCOL 3350 17 G: 17 POWDER, FOR SOLUTION ORAL at 17:41

## 2022-08-06 RX ADMIN — INSULIN DETEMIR 22 UNITS: 100 INJECTION, SOLUTION SUBCUTANEOUS at 09:01

## 2022-08-06 RX ADMIN — DICLOFENAC 2 G: 10 GEL TOPICAL at 12:57

## 2022-08-06 RX ADMIN — VITAM B12 100 MCG: 100 TAB at 09:03

## 2022-08-06 RX ADMIN — SENNOSIDES AND DOCUSATE SODIUM 1 TABLET: 50; 8.6 TABLET ORAL at 09:03

## 2022-08-06 RX ADMIN — HYDRALAZINE HYDROCHLORIDE 25 MG: 25 TABLET, FILM COATED ORAL at 05:55

## 2022-08-06 RX ADMIN — ATORVASTATIN CALCIUM 80 MG: 40 TABLET, FILM COATED ORAL at 20:56

## 2022-08-06 RX ADMIN — CYCLOBENZAPRINE 5 MG: 10 TABLET, FILM COATED ORAL at 14:24

## 2022-08-06 RX ADMIN — INSULIN LISPRO 6 UNITS: 100 INJECTION, SOLUTION INTRAVENOUS; SUBCUTANEOUS at 12:57

## 2022-08-06 RX ADMIN — HEPARIN SODIUM 5000 UNITS: 5000 INJECTION INTRAVENOUS; SUBCUTANEOUS at 20:56

## 2022-08-06 RX ADMIN — GUAIFENESIN 600 MG: 600 TABLET, EXTENDED RELEASE ORAL at 20:56

## 2022-08-06 RX ADMIN — BUMETANIDE 2 MG: 2 TABLET ORAL at 17:42

## 2022-08-06 RX ADMIN — Medication 10 ML: at 09:06

## 2022-08-06 RX ADMIN — PREGABALIN 50 MG: 50 CAPSULE ORAL at 20:56

## 2022-08-06 RX ADMIN — PREGABALIN 50 MG: 50 CAPSULE ORAL at 09:03

## 2022-08-06 RX ADMIN — DICLOFENAC 2 G: 10 GEL TOPICAL at 09:02

## 2022-08-06 RX ADMIN — AMLODIPINE BESYLATE 5 MG: 5 TABLET ORAL at 09:03

## 2022-08-06 RX ADMIN — HYDRALAZINE HYDROCHLORIDE 25 MG: 25 TABLET, FILM COATED ORAL at 14:24

## 2022-08-06 RX ADMIN — DICLOFENAC 2 G: 10 GEL TOPICAL at 20:56

## 2022-08-06 RX ADMIN — ISOSORBIDE DINITRATE 20 MG: 20 TABLET ORAL at 20:56

## 2022-08-06 RX ADMIN — BUMETANIDE 1 MG: 1 TABLET ORAL at 09:20

## 2022-08-06 RX ADMIN — ASPIRIN 81 MG: 81 TABLET, COATED ORAL at 09:03

## 2022-08-06 RX ADMIN — CASTOR OIL AND BALSAM, PERU 1 APPLICATION: 788; 87 OINTMENT TOPICAL at 09:02

## 2022-08-06 RX ADMIN — HEPARIN SODIUM 5000 UNITS: 5000 INJECTION INTRAVENOUS; SUBCUTANEOUS at 09:10

## 2022-08-06 RX ADMIN — POLYETHYLENE GLYCOL 3350 17 G: 17 POWDER, FOR SOLUTION ORAL at 09:10

## 2022-08-06 RX ADMIN — CYCLOBENZAPRINE 5 MG: 10 TABLET, FILM COATED ORAL at 21:49

## 2022-08-06 RX ADMIN — Medication 10 ML: at 21:04

## 2022-08-06 RX ADMIN — LIDOCAINE 1 PATCH: 50 PATCH CUTANEOUS at 09:06

## 2022-08-06 RX ADMIN — CASTOR OIL AND BALSAM, PERU 1 APPLICATION: 788; 87 OINTMENT TOPICAL at 20:56

## 2022-08-06 RX ADMIN — DIAZEPAM 4 MG: 2 TABLET ORAL at 11:08

## 2022-08-06 RX ADMIN — INSULIN LISPRO 6 UNITS: 100 INJECTION, SOLUTION INTRAVENOUS; SUBCUTANEOUS at 17:42

## 2022-08-06 RX ADMIN — GUAIFENESIN 600 MG: 600 TABLET, EXTENDED RELEASE ORAL at 09:03

## 2022-08-06 RX ADMIN — AMOXICILLIN AND CLAVULANATE POTASSIUM 1 TABLET: 875; 125 TABLET, FILM COATED ORAL at 09:03

## 2022-08-06 RX ADMIN — INSULIN LISPRO 6 UNITS: 100 INJECTION, SOLUTION INTRAVENOUS; SUBCUTANEOUS at 09:02

## 2022-08-06 RX ADMIN — ISOSORBIDE DINITRATE 20 MG: 20 TABLET ORAL at 09:03

## 2022-08-06 RX ADMIN — DICLOFENAC 2 G: 10 GEL TOPICAL at 17:43

## 2022-08-06 RX ADMIN — SENNOSIDES AND DOCUSATE SODIUM 1 TABLET: 50; 8.6 TABLET ORAL at 20:56

## 2022-08-06 RX ADMIN — INSULIN LISPRO 2 UNITS: 100 INJECTION, SOLUTION INTRAVENOUS; SUBCUTANEOUS at 17:42

## 2022-08-06 RX ADMIN — ALLOPURINOL 300 MG: 300 TABLET ORAL at 09:03

## 2022-08-06 RX ADMIN — HYDRALAZINE HYDROCHLORIDE 25 MG: 25 TABLET, FILM COATED ORAL at 20:56

## 2022-08-06 NOTE — PROGRESS NOTES
[facility] Medicine Services  PROGRESS NOTE    Patient Name: Susan Anderson  : 1940  MRN: [id number]    Date of Admission: 2022  Primary Care Physician: Arleen Doherty MD    Subjective     CC: f/u weakness, constipation    HPI:  Pt sitting up in bed stating she doesn't want the oxygen tubing touching her neck. She is A&Ox3. She is not interested in trying to feed herself. Equal strength noted in both hands and arms. She is able to touch the oxygen tubing with her hands.     ROS:  Limited as patient is unwilling to participate. She is annoyed by her oxygen tubing, reports generalized pain.    Objective     Vital Signs:   Temp:  [97.2 °F (36.2 °C)-98.4 °F (36.9 °C)] 97.6 °F (36.4 °C)  Heart Rate:  [] 95  Resp:  [15-18] 17  BP: (110-164)/(52-88) 128/88  Flow (L/min):  [2.5-4] 4     Physical Exam:  Constitutional: Awake, alert, NAD  HENT: NCAT, mucous membranes moist  Respiratory: Clear/dim in the bases due to habitus, nonlabored  Cardiovascular: RRR, no murmurs, rubs, or gallops  Gastrointestinal: Positive bowel sounds, soft, nontender, nondistended  Musculoskeletal: mild bilateral ankle edema  Psychiatric: flat affect, uncooperative at times  Neurologic: Oriented x 3, strength symmetric in all extremities, Cranial Nerves grossly intact to confrontation, speech clear  Skin: No rashes on exposed skin.    Results Reviewed:  LAB RESULTS:      Lab 22  0752 22  0701 22  0708 22  0525   WBC 6.16 4.72 5.77 5.32   HEMOGLOBIN 9.3* 9.7* 9.5* 9.1*   HEMATOCRIT 28.8* 30.7* 29.5* 27.1*   PLATELETS 200 147 188 184   MCV 92.6 91.1 91.6 90.3         Lab 22  0752 22  0812 22  0854 22  0701 22  0708 22  1647   SODIUM 144 141 143 143 142  --    POTASSIUM 4.7 4.2 4.5 4.6 4.2  --    CHLORIDE 102 101 102 104 103  --    CO2 31.0* 32.0* 32.0* 31.0* 32.0*  --    ANION GAP 11.0 8.0 9.0 8.0 7.0  --    BUN 65* 62* 65* 65* 71*  --     CREATININE 1.51* 1.39* 1.38* 1.34* 1.43*  --    EGFR 34.2* 37.7* 38.1* 39.4* 36.5*  --    GLUCOSE 106* 168* 163* 167* 90  --    CALCIUM 8.9 8.8 8.7 9.2 9.0  --    PHOSPHORUS  --   --   --   --   --  4.2         Lab 07/31/22  0525   TOTAL PROTEIN 5.1*   ALBUMIN 2.80*   GLOBULIN 2.3   ALT (SGPT) 24   AST (SGOT) 29   BILIRUBIN 0.7   ALK PHOS 197*         Lab 08/03/22  0854   PROBNP 3,452.0*     Brief Urine Lab Results  (Last result in the past 365 days)      Color   Clarity   Blood   Leuk Est   Nitrite   Protein   CREAT   Urine HCG        07/31/22 1503             27.0             Microbiology Results Abnormal     Procedure Component Value - Date/Time    Blood Culture - Blood, Arm, Right [[id number]]  (Normal) Collected: 07/29/22 1456    Lab Status: Final result Specimen: Blood from Arm, Right Updated: 08/03/22 1517     Blood Culture No growth at 5 days    Blood Culture - Blood, Hand, Left [[id number]]  (Normal) Collected: 07/29/22 1456    Lab Status: Final result Specimen: Blood from Hand, Left Updated: 08/03/22 1517     Blood Culture No growth at 5 days    Body Fluid Culture - Body Fluid, Knee, Right [[id number]] Collected: 07/29/22 1400    Lab Status: Final result Specimen: Body Fluid from Knee, Right Updated: 08/03/22 1009     Body Fluid Culture No growth at 5 days     Gram Stain Rare (1+) WBCs seen      No organisms seen    Eosinophil Smear - Urine, Urine, Clean Catch [[id number]]  (Normal) Collected: 07/31/22 1503    Lab Status: Final result Specimen: Urine, Clean Catch Updated: 07/31/22 1627     Eosinophil Smear 0 % EOS/100 Cells     Narrative:      No eosinophil seen    COVID PRE-OP / PRE-PROCEDURE SCREENING ORDER (NO ISOLATION) - Swab, Nasopharynx [[id number]]  (Normal) Collected: 07/26/22 2300    Lab Status: Final result Specimen: Swab from Nasopharynx Updated: 07/26/22 2340    Narrative:      The following orders were created for panel order COVID PRE-OP / PRE-PROCEDURE SCREENING ORDER (NO ISOLATION) - Swab,  Nasopharynx.  Procedure                               Abnormality         Status                     ---------                               -----------         ------                     COVID-19 and FLU A/B PCR...[[id number]]  Normal              Final result                 Please view results for these tests on the individual orders.    COVID-19 and FLU A/B PCR - Swab, Nasopharynx [[id number]]  (Normal) Collected: 07/26/22 2300    Lab Status: Final result Specimen: Swab from Nasopharynx Updated: 07/26/22 2340     COVID19 Not Detected     Influenza A PCR Not Detected     Influenza B PCR Not Detected    Narrative:      Fact sheet for providers: https://www.fda.gov/media/816715/download    Fact sheet for patients: https://www.fda.gov/media/850211/download    Test performed by PCR.        No radiology results from the last 24 hrs  Results for orders placed during the hospital encounter of 07/26/22    Adult Transthoracic Echo Complete W/ Cont if Necessary Per Protocol    Interpretation Summary  · Normal left ventricular systolic function, estimated EF 55%.  · Aortic sclerosis without aortic stenosis or regurgitation.  · Mild mitral regurgitation.    I have reviewed the medications:  Scheduled Meds:allopurinol, 300 mg, Oral, Daily  amLODIPine, 5 mg, Oral, Daily  amoxicillin-clavulanate, 1 tablet, Oral, Daily  aspirin, 81 mg, Oral, Daily  atorvastatin, 80 mg, Oral, Nightly  bumetanide, 1 mg, Oral, BID  castor oil-balsam peru, 1 application, Topical, Q12H  Diclofenac Sodium, 2 g, Topical, 4x Daily  guaiFENesin, 600 mg, Oral, Q12H  heparin (porcine), 5,000 Units, Subcutaneous, Q12H  hydrALAZINE, 25 mg, Oral, Q8H  insulin detemir, 22 Units, Subcutaneous, Daily  insulin lispro, 0-7 Units, Subcutaneous, TID AC  Insulin Lispro, 6 Units, Subcutaneous, TID With Meals  isosorbide dinitrate, 20 mg, Oral, BID  lidocaine, 1 patch, Transdermal, Q24H  polyethylene glycol, 17 g, Oral, BID  pregabalin, 50 mg, Oral, Q12H  senna-docusate  sodium, 1 tablet, Oral, BID  sodium chloride, 10 mL, Intravenous, Q12H  vitamin B-12, 100 mcg, Oral, Daily      Continuous Infusions:   PRN Meds:.•  acetaminophen **OR** [DISCONTINUED] acetaminophen **OR** [DISCONTINUED] acetaminophen  •  baclofen  •  bisacodyl  •  bisacodyl  •  dextrose  •  dextrose  •  diazePAM  •  glucagon (human recombinant)  •  magnesium hydroxide  •  ondansetron **OR** ondansetron  •  oxyCODONE  •  [COMPLETED] Insert peripheral IV **AND** sodium chloride  •  sodium chloride    Assessment & Plan     Active Hospital Problems    Diagnosis  POA   • **Weakness [R53.1]  Yes   • Anemia [D64.9]  Unknown   • Fall [W19.XXXA]  Unknown   • Dizziness [R42]  Unknown   • Acoustic neuroma (HCC) [D33.3]  Unknown   • Elevated troponin [R77.8]  Unknown   • CHF exacerbation (HCC) [I50.9]  Unknown   • Elevated serum creatinine [R79.89]  Unknown   • NICM (nonischemic cardiomyopathy) (HCC) [I42.8]  Yes   • Coronary artery disease involving native coronary artery of native heart without angina pectoris [I25.10]  Yes   • Dyslipidemia [E78.5]  Yes   • Chronic combined systolic and diastolic heart failure (HCC) [I50.42]  Yes   • Mitral valve disease [I05.9]  Yes   • PVD (peripheral vascular disease) ([facility]) [I73.9]  Yes   • Essential hypertension [I10]  Yes   • CKD (chronic kidney disease), stage III ([facility]) [N18.30]  Yes      Resolved Hospital Problems   No resolved problems to display.     Brief Hospital Course to date:  Susan Anderson is a 82 y.o. female with PMH significant for HTN, HLD, CAD, non-ischemic cardiomyopathy, chronic combined systolic/diastolic CHF, CKD III, PVD, insulin-dependent DMII, ELIUD (on 2L NC QHS, chronic BLE edema, and acoustic neuroma. She was admitted to [facility]/26[facility]22 for mild rhabdomyolysis after a fall at home. Also found to have a/c CHF exacerbation and LACEY.     This patient's problems and plans were partially entered by my partner and updated as appropriate by me  08/06/22.    Fall at home  Generalized weakness  - Fall at home in bathroom when she opted not to wait for her bath aide to help her  - Cardiology has evaluated and feel her falls are likely mechanical and not cardiac. May have some orthostasis. Patient does report she feels better with SBP in 150's, so allowing some permissive hypertension   - PT/OT following - [facility] has accepted pending medical readiness     Rhabdomyolysis, resolved   -  on admission, s/p IV fluids     Acute on chronic combined systolic/diastolic CHF  Nonischemic cardiomyopathy   LACEY on CKD III - baseline 1.3-1.5  - Appreciate nephrology assistance  - 8/1 renal artery duplex suggestive of intrinsic disease but no significant renal artery stenosis or obstructive uropathy   - Creatinine 1.9 on admission, has since returned to baseline  - CXR 8/3 showed congestive heart failure with moderate bibasilar effusion/atelectasis  - Nephrology diuresing, now on Bumex 1mg BID  - s/p IV albumin   - Fluid restriction per nephrology     Blood culture (+) MRSA, suspected contaminant   - 7/26 - 2 of 2 blood cultures (+) staph epidermis, 1 of 2 positive for MRSA  - Repeat blood cultures on 7/31 NGTD  - Appreciate ID assistance. No source of infection has been identified.  - Discussed with ID on 8/1, suspect contaminant. Antibiotics stopped    Right knee effusion   - Orthopedic surgery has evaluated  - Joint aspiration not consistent with infection. Culture NGTD  - MRI of R knee does not suggest structural injury to the knee  - WBAT. Knee immobilizer on for comfort  - Follow up with Dr. Pearce in 2-3 weeks     Low back spasms   - PRN Robaxin ineffective per patient  - Seems oversedated  - Changed Baclofen to 5mg BID PRN  - Continue Oxycodone 2.5 but decrease to Q6H PRN  - Change Valium 4mg QHS PRN only     Chronic anemia   - PO iron supplement     Insulin-dependent DMII   Diabetic peripheral neuropathy  - Hgb A1c 9.0%  - Continue Levemir 22 units daily and  Lispro 6 units TID AC + SSI  - Continue Lyrica 50mg BID     History of R great toe osteomyelitis  - s/p TMA 4/2021 by Dr. Finney.   - Culture (+) MSSA  - Has been on chronic suppressive Augmentin for ~1 year  - Back on Augmentin per ID     Dizziness  Acoustic neuroma, chronic  - ?contributing to falls  - Had initial w/u for neuroma at [facility]  - Has not had recent follow up imaging, consider MRI with/without contrast if GFR continues to improve prior to DC     Essential hypertension  - Beta blocker stopped due to bradycardia  - Restarted Lipitor now that Daptomycin has been stopped  - Continue Amlodipine 5mg daily, Bumex 1mg BID, Hydralazine 25mg TID, Isordil 20mg TID,      Constipation  - Continue Miralax / Senna  - Make Miralax BID  - Give Dulcolax 10mg and Milk of Mag 10mg now     Expected Discharge Location and Transportation: [facility] via EMS or medical transport van   Expected Discharge Date: 8/8/22 or later pending volume status / mental status and creatinine     DVT prophylaxis:Medical DVT prophylaxis orders are present.     AM-PAC 6 Clicks Score (PT): 10 (08/05/22 0800)    CODE STATUS:   Code Status and Medical Interventions:   Ordered at: 07/27/22 0143     Code Status (Patient has no pulse and is not breathing):    CPR (Attempt to Resuscitate)     Medical Interventions (Patient has pulse or is breathing):    Full Support     Sandra Bernstein, INDIGO  08/06/22

## 2022-08-06 NOTE — PLAN OF CARE
Goal Outcome Evaluation:  Plan of Care Reviewed With: patient        Progress: improving     Pt is alert to self. Disoriented to time, situation and time. Slept on/off throughout the night. VSS. NSR on telemetry. Q2H weight shifting. 2.5-4l oxygen via nasal canula.

## 2022-08-06 NOTE — PROGRESS NOTES
INFECTIOUS DISEASE  Progress Note    Susan Anderson  1940  [id number]    Admission Date: 7/26/2022      Requesting Provider: Brigid Seo MD  Evaluating Physician: Stef Pete MD    Reason for Consultation: Bacteremia    History of present illness:    7/29/22: Patient is a 82 y.o. female with h/o T2DM, CHF, CKD Stage III, HTN, chronic BLE edema, chronic right lymphedema, ELIUD/nocturnal 2L O2 NC, acoustic neuroma, CAD, T2DM, gout, HLD, asthma, PVD/right great toe TMA 4/2021/now on oral Augmentin therapy for 6 to 12 weeks from 5/19/22, cervical cancer/hysterectomy, and chronic mixed diastolic/systolic CHF, and NICM with EF 40-45% who we were asked to see for MRSA bacteremia.  The patient presented to [facility] ED on 7/26 after falling at home and pinned between the toilet and her wheelchair.  She lives alone and is unsure what happened, but states that she did not lose consciousness.  She had episode of severe diarrhea before and after falling.  Her daughter found her on the floor and called EMS.  She developed a spasm in her left lower back radiating to her hip and LLE making it difficult to get her up off the floor.  He also scraped her right knee.  Her bumex was recently doubled for 3 days for BLE edema.  She is not very mobile or independent requiring wheelchair for mobilization and requires assistance for showering.  She denies fever, chills, shortness of breath, nausea, vomiting, diarrhea, or dysuria.  She has had some nasal congestion.  On arrival to ED, her Tlow was 93.9 and her HR was bradycardic.  Admitting labs were A1C 9.0, WBC 6300 with 81% segs/2% bands, , creatinine 1.8 (baseline around 1.2-1.4), PCT 0.09, lactic acid 1.1, and UA WBC 0-2.  Blood cultures are positive in 2 of 2 sets (4 of 4 bottles) with GPC and BCID positive for MRSA and CoNS.  A COVID-19/Flu PCR was negative.  Imaging showed no evidence of acute fractures.  She had some swelling of right side of periorbital/cheek soft  tissue, chronic right maxillary sinusitis, mild pulmonary edema, subcutaneous edema of right chest and lateral abdominal wall with small volume ascites similar to 7/13/22, and prepatellar soft tissue swelling c/w hematoma or bursitis.  A CT scan of head showed no bleeds. She is currently on Vancomycin.  ID was asked to evaluate and manage her antibiotic therapy.    Her daughter indicates that she was lying on the floor at home for least 10 hours, unable to arise.  Her usual caretaker did not come to the house that day.    7/30/22: She has remained afebrile. Repeat blood cultures from 7/29 are pending.  Right prepatellar aspirate Gram stain revealed rare white blood cells with no organisms seen. Blood cultures from 726 have grown Staph epidermidis in both sets and MRSA in 1 set. Echocardiogram yesterday revealed mild mitral regurgitation.  She continues to complain of severe right knee pain.  Orthopedics has ordered an MRI scan.    8/1/22: She has remained afebrile. Follow-up blood cultures from 7/29 at been negative.  Right prepatellar aspirate culture is negative so far.  MRI scan of the right knee Revealed a moderate knee effusion which was nonspecific.  She had diffuse circumferential soft tissue swelling with isointense prepatellar collection consistent with hematoma versus infectious bursitis versus posttraumatic.  She was also noted to have complex degenerative tearing of the menisci.  She had an indistinct anterior cruciate ligament. White blood cell count today is 5.8.  Creatinine is 1.43.  She feels better today.  She has some improvement in her right knee pain.     8/2/22: She has remained afebrile.  She still complains of right knee pain.  She was able to get out of bed with assistance yesterday. Her creatinine today is 1.34.  Her white blood cell count is 4.7. Overall, she feels somewhat better.    8/3/22: She has remained afebrile.  She is on oral Augmentin. She denies nausea or vomiting.  She has some  decreased right knee pain.    8/4/22: She remains afebrile. She feels somewhat better today.  She has decreased right knee pain.  She complains of fatigue.    8/5/22: She has remained afebrile.  She has decreased right knee pain.  She is awaiting transfer to rehab.  She complains of an exacerbation of her chronic lumbar back pain.  She is more drowsy today, apparently after pain medications.    8/6/22: She has been severely encephalopathic with psychosis today.  This is thought to be medication induced and her medications are being adjusted.  She has remained afebrile.  She cannot provide a reliable ROS.        Past Medical History:   Diagnosis Date   • Arthritis    • Asthma 6/4 - double pneumonia    Currently on inhaler and nebulizer   • Cancer (HCC)     cervical cancer, skin cancer   • CHF (congestive heart failure) ([facility]) June 4, 2021   • Chronic kidney disease Related to diabetes   • Coronary artery disease 6/4/2021 DX for hear failure   • Diabetes mellitus (HCC) 30 years    Seeing Dr. Lam 1st time Aug 19   • Gout    • Hx of colonoscopy    • Hyperlipidemia Reference current labs x 2-3yrs approx   • Hypertension 30 years   • Migraine    • Mitral valve disease    • Mitral valve disease    • Osteomyelitis (HCC)    • Peripheral neuropathy    • Sleep apnea    • Type 2 diabetes mellitus (HCC)     30 years       Past Surgical History:   Procedure Laterality Date   • ABDOMINAL HYSTERECTOMY W/SALPINGECTOMY     • AMPUTATION  Right great toe, 1st 1/3 metatarsal -Reg 4/14/21   • AORTAGRAM N/A 4/9/2021    Procedure: AORTAGRAM WITH OR WITHOUT RUNOFFS, WITH Co2;  Surgeon: Trey Forrest MD;  Location: [facility]BR[facility];  Service: Vascular;  Laterality: N/A;   • APPENDECTOMY     • BRAIN TUMOR EXCISION      laser surgery    • CARDIAC CATHETERIZATION N/A 6/21/2021    Procedure: LEFT HEART CATH;  Surgeon: Anjum Ceballos MD;  Location: [facility] CATH INVASIVE LOCATION;  Service: Cardiology;  Laterality: N/A;   • CATARACT EXTRACTION  W/ INTRAOCULAR LENS  IMPLANT, BILATERAL     • CHOLECYSTECTOMY     • EYE SURGERY     • HYSTERECTOMY     • TOE SURGERY     • TRANS METATARSAL AMPUTATION Right 4/14/2021    Procedure: AMPUTATION TRANS METATARSAL RIGHT GREAT TOE;  Surgeon: Valdez Finney MD;  Location: [facility];  Service: Vascular;  Laterality: Right;       Family History   Problem Relation Age of Onset   • Diabetes Mother         Type II   • Heart disease Father    • Heart attack Father    • Coronary artery disease Father    • Diabetes Father         Type II   • Diabetes Sister    • Diabetes Maternal Grandmother    • Diabetes Maternal Grandfather    • Diabetes Paternal Grandmother    • Diabetes Paternal Grandfather        Social History     Socioeconomic History   • Marital status:    • Number of children: 1   Tobacco Use   • Smoking status: Never Smoker   • Smokeless tobacco: Never Used   Vaping Use   • Vaping Use: Never used   Substance and Sexual Activity   • Alcohol use: Yes     Comment: Social drinking when not recovering from hospitalization   • Drug use: Never   • Sexual activity: Not Currently     Birth control/protection: None       Allergies   Allergen Reactions   • Cephalexin Nausea Only   • Erythromycin Base Nausea Only   • Melatonin Other (See Comments)     Nightmares   • Oxycodone Nausea Only   • Sulfa Antibiotics Nausea Only         Medication:    Current Facility-Administered Medications:   •  acetaminophen (TYLENOL) tablet 650 mg, 650 mg, Oral, Q4H PRN, 650 mg at 08/03/22 0813 **OR** [DISCONTINUED] acetaminophen (TYLENOL) 160 MG/5ML solution 650 mg, 650 mg, Oral, Q4H PRN **OR** [DISCONTINUED] acetaminophen (TYLENOL) suppository 650 mg, 650 mg, Rectal, Q4H PRN, Breana, Chey G, DO  •  allopurinol (ZYLOPRIM) tablet 300 mg, 300 mg, Oral, Daily, Breana, Chey G, DO, 300 mg at 08/06/22 0903  •  amLODIPine (NORVASC) tablet 5 mg, 5 mg, Oral, Daily, Breana, Chey G, DO, 5 mg at 08/06/22 0903  •  amoxicillin-clavulanate (AUGMENTIN)  875-125 MG per tablet 1 tablet, 1 tablet, Oral, Daily, Stef Pete MD, 1 tablet at 08/06/22 0903  •  aspirin EC tablet 81 mg, 81 mg, Oral, Daily, Chey Toussaint, DO, 81 mg at 08/06/22 0903  •  atorvastatin (LIPITOR) tablet 80 mg, 80 mg, Oral, Nightly, Anna Crystal PA-C, 80 mg at 08/05/22 2106  •  bisacodyl (DULCOLAX) EC tablet 10 mg, 10 mg, Oral, Daily PRN, Anna Crystal PA-C, 10 mg at 08/02/22 0821  •  bisacodyl (DULCOLAX) suppository 10 mg, 10 mg, Rectal, Daily PRN, Anna Crystal PA-C  •  bumetanide (BUMEX) tablet 1 mg, 1 mg, Oral, BID, Anna Crystal PA-C, 1 mg at 08/06/22 0920  •  castor oil-balsam peru (VENELEX) ointment 1 application, 1 application, Topical, Q12H, Brigid Seo MD, 1 application at 08/06/22 0902  •  cyclobenzaprine (FLEXERIL) tablet 5 mg, 5 mg, Oral, TID PRN, Sandra Bernstein APRN  •  dextrose (D50W) (25 g/50 mL) IV injection 25 g, 25 g, Intravenous, Q15 Min PRN, Chey Toussaint, DO  •  dextrose (GLUTOSE) oral gel 15 g, 15 g, Oral, Q15 Min PRN, Chey Toussaint G, DO  •  diazePAM (VALIUM) tablet 4 mg, 4 mg, Oral, Q12H PRN, Sandra Bernstein APRN, 4 mg at 08/06/22 1108  •  Diclofenac Sodium (VOLTAREN) 1 % gel 2 g, 2 g, Topical, 4x Daily, Brigid Seo MD, 2 g at 08/06/22 1257  •  glucagon (human recombinant) (GLUCAGEN DIAGNOSTIC) injection 1 mg, 1 mg, Intramuscular, Q15 Min PRN, Chey Toussaint, DO  •  guaiFENesin (MUCINEX) 12 hr tablet 600 mg, 600 mg, Oral, Q12H, Anna Crystal PA-C, 600 mg at 08/06/22 0903  •  heparin (porcine) 5000 UNIT/ML injection 5,000 Units, 5,000 Units, Subcutaneous, Q12H, Chey Toussaint DO, 5,000 Units at 08/06/22 0910  •  hydrALAZINE (APRESOLINE) tablet 25 mg, 25 mg, Oral, Q8H, Ruddy Guerrier, DO, 25 mg at 08/06/22 0555  •  insulin detemir (LEVEMIR) injection 22 Units, 22 Units, Subcutaneous, Daily, Anna Crystal PA-C, 22 Units at 08/06/22 0901  •  Insulin Lispro (humaLOG) injection 0-7 Units, 0-7 Units,  Subcutaneous, TID AC, Anna Crystal PA-C, 2 Units at 08/06/22 1256  •  Insulin Lispro (humaLOG) injection 6 Units, 6 Units, Subcutaneous, TID With Meals, Anna Crystal PA-C, 6 Units at 08/06/22 1257  •  isosorbide dinitrate (ISORDIL) tablet 20 mg, 20 mg, Oral, BID, Breana, Chey G, DO, 20 mg at 08/06/22 0903  •  lidocaine (LIDODERM) 5 % 1 patch, 1 patch, Transdermal, Q24H, Breana, Chey G, DO, 1 patch at 08/06/22 0906  •  magnesium hydroxide (MILK OF MAGNESIA) suspension 10 mL, 10 mL, Oral, Daily PRN, Anna Crystal PA-C, 10 mL at 08/02/22 0820  •  ondansetron (ZOFRAN) tablet 4 mg, 4 mg, Oral, Q6H PRN **OR** ondansetron (ZOFRAN) injection 4 mg, 4 mg, Intravenous, Q6H PRN, Breana, Chey G, DO, 4 mg at 08/02/22 0106  •  polyethylene glycol (MIRALAX) packet 17 g, 17 g, Oral, BID, Anna Crystal PA-C, 17 g at 08/06/22 0910  •  pregabalin (LYRICA) capsule 50 mg, 50 mg, Oral, Q12H, Breana, Chey G, DO, 50 mg at 08/06/22 0903  •  sennosides-docusate (PERICOLACE) 8.6-50 MG per tablet 1 tablet, 1 tablet, Oral, BID, Chey, Ruddy, DO, 1 tablet at 08/06/22 0903  •  [COMPLETED] Insert peripheral IV, , , Once **AND** sodium chloride 0.9 % flush 10 mL, 10 mL, Intravenous, PRN, Breana, Chey G, DO  •  sodium chloride 0.9 % flush 10 mL, 10 mL, Intravenous, Q12H, Breana, Chey G, DO, 10 mL at 08/06/22 0906  •  sodium chloride 0.9 % flush 10 mL, 10 mL, Intravenous, PRN, Breana, Chey G, DO  •  vitamin B-12 (CYANOCOBALAMIN) tablet 100 mcg, 100 mcg, Oral, Daily, Chey Toussaint, DO, 100 mcg at 08/06/22 0903    Antibiotics:  Anti-Infectives (From admission, onward)    Ordered     Dose/Rate Route Frequency Start Stop    08/01/22 1229  amoxicillin-clavulanate (AUGMENTIN) 875-125 MG per tablet 1 tablet        Ordering Provider: Stef Pete MD    1 tablet Oral Daily 08/01/22 1315 08/31/22 0859    07/28/22 0859  vancomycin (VANCOCIN) 1000 mg/200 mL dextrose 5% IVPB        Ordering Provider: Tapan  Tom CHAO, LizandroD    10 mg/kg × 104 kg  over 60 Minutes Intravenous Once 22 1000 22 1028    22 1824  vancomycin 1750 mg/500 mL 0.9% NS IVPB ([facility])        Ordering Provider: Adalid Mcrae RPH    20 mg/kg × 90.7 kg  over 105 Minutes Intravenous Once 22 1915 22 2204            Review of Systems:  See HPI    Physical Exam:   Vital Signs  Temp (24hrs), Av.7 °F (36.5 °C), Min:97.2 °F (36.2 °C), Max:98.4 °F (36.9 °C)    Temp  Min: 97.2 °F (36.2 °C)  Max: 98.4 °F (36.9 °C)  BP  Min: 110/52  Max: 164/73  Pulse  Min: 71  Max: 106  Resp  Min: 15  Max: 18  SpO2  Min: 90 %  Max: 95 %    GENERAL: Severe confusion/psychosis.  HEENT: Normocephalic, atraumatic.  PERRL. EOMI. No conjunctival injection. No icterus. No labial ulcers  NECK: Supple   HEART: RRR; No murmur  LUNGS: Clear to auscultation bilaterally without wheezing, rales, rhonchi. Normal respiratory effort.  ABDOMEN: Soft, nontender, nondistended.   EXT:  No cyanosis, clubbing or edema. No cord.  :  Without Bui catheter.  MSK: Her right pretibial wound is 3-4 cm in diameter with some residual slough but no cellulitis.  There is decreasedprepatellar swelling secondary to hematoma.  SKIN: Warm and dry without cutaneous eruptions on Inspection/palpation.    NEURO: Alert and responsive but severely confused.    Laboratory Data    Results from last 7 days   Lab Units 22  0752 22  0701 22  0708   WBC 10*3/mm3 6.16 4.72 5.77   HEMOGLOBIN g/dL 9.3* 9.7* 9.5*   HEMATOCRIT % 28.8* 30.7* 29.5*   PLATELETS 10*3/mm3 200 147 188     Results from last 7 days   Lab Units 22  0752   SODIUM mmol/L 144   POTASSIUM mmol/L 4.7   CHLORIDE mmol/L 102   CO2 mmol/L 31.0*   BUN mg/dL 65*   CREATININE mg/dL 1.51*   GLUCOSE mg/dL 106*   CALCIUM mg/dL 8.9     Results from last 7 days   Lab Units 22  0525   ALK PHOS U/L 197*   BILIRUBIN mg/dL 0.7   ALT (SGPT) U/L 24   AST (SGOT) U/L 29                 Results from last 7 days   Lab  Units 07/31/22  1647   CK TOTAL U/L 344*         Estimated Creatinine Clearance: 31.6 mL/min (A) (by C-G formula based on SCr of 1.51 mg/dL (H)).      Microbiology:  Microbiology Results (last 10 days)     Procedure Component Value - Date/Time    Eosinophil Smear - Urine, Urine, Clean Catch [[id number]]  (Normal) Collected: 07/31/22 1503    Lab Status: Final result Specimen: Urine, Clean Catch Updated: 07/31/22 1627     Eosinophil Smear 0 % EOS/100 Cells     Narrative:      No eosinophil seen    Blood Culture - Blood, Arm, Right [[id number]]  (Normal) Collected: 07/29/22 1456    Lab Status: Final result Specimen: Blood from Arm, Right Updated: 08/03/22 1517     Blood Culture No growth at 5 days    Blood Culture - Blood, Hand, Left [[id number]]  (Normal) Collected: 07/29/22 1456    Lab Status: Final result Specimen: Blood from Hand, Left Updated: 08/03/22 1517     Blood Culture No growth at 5 days    Body Fluid Culture - Body Fluid, Knee, Right [[id number]] Collected: 07/29/22 1400    Lab Status: Final result Specimen: Body Fluid from Knee, Right Updated: 08/03/22 1009     Body Fluid Culture No growth at 5 days     Gram Stain Rare (1+) WBCs seen      No organisms seen                Radiology:  MRI Lumbar Spine Without Contrast    Result Date: 8/6/2022  1.  Multilevel advanced degenerative changes similar to the more recent imaging.  This report was finalized on 8/6/2022 12:33 PM by Fam Hunt MD.      XR Chest 1 View    Result Date: 8/6/2022  1.  There are findings that could reflect changes from heart failure which have been suggested.  This report was finalized on 8/6/2022 11:16 AM by Fam Hunt MD.      XR Chest 1 View    Result Date: 8/3/2022  Congestive heart failure with moderate bibasilar effusion/atelectasis.  This report was finalized on 8/3/2022 12:17 PM by Dr. Breezy Rey MD.      MRI Knee Right Without Contrast    Result Date: 8/1/2022   No acute osseous findings.  There is a moderate knee joint effusion  which is nonspecific. No obvious destructive joint space loss, discrete bony erosion, or subchondral marrow edema to suggest septic joint otherwise, however clinical correlation is required and consider joint fluid sampling as clinically indicated.  Diffuse circumferential soft tissue swelling/subcutaneous edema about the knee, including isointense prepatellar collection which may reflect posttraumatic versus infectious bursitis versus hematoma.  Moderate to severe tricompartmental osteoarthritic change. Complex degenerative tearing of the menisci.  Indistinctness of the anterior cruciate ligament, difficult to distinguish whether this reflects poor visualization from motion degradation artifact versus mucoid degeneration or chronic tear.  This report was finalized on 8/1/2022 8:31 AM by Kings Baez MD.      Duplex Renal Artery - Bilateral Complete CAR    Result Date: 8/1/2022   1. Limited study due to the patient's body habitus and lack of cooperation. 2. Elevated resistive indices which may reflect underlying intrinsic renal disease. 3. No evidence of obstructive uropathy or significant renal artery stenosis.  This report was finalized on 8/1/2022 4:20 PM by Trey Saba MD.        Impression:   1. 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  The fact that she has 2 positive blood cultures for staph epidermis with MRSA also one of the sets suggest that the isolates all skin contaminants.  In addition, follow-up blood cultures have been negative.  She does not have any focal evidence of MRSA infection.  She is now stable off of intravenous antibiotics  2. Right prepatellar hematoma/right knee pain-her routine x-ray was negative for fracture.  MRI scan does not reveal a fracture or tendon rupture.  3. Recent onset of left pretibial ulcer  4. 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 On chronic Augmentin  oral suppressive therapy for about a year now.  H/o MSSA.  5. Hypothermia, improved  6. Acute on chronic kidney disease Stage III, ATN vs rhabdomyolysis  7. Rhadomyolysis, after fall and lying on right side.  8. Right-sided edema/anasarca  9. Elevated LFTs/bilirubin related to above.   10. Chronic BLE edema/RLE lymphedema  11. Type 2 diabetes mellitus, poorly controlled  12. Essential hypertension  13. Nonischemic cardiomyopathy with EF 4-45%  14. Chronic diastolic/systolic mixed CHF  15. Acoustic neuroma  16. Cephelexin, erythromycin, and sulfa intolerances (GI intolerance).    17. Metabolic encephalopathy-this is likely medication induced and her medications are being adjusted.    PLAN/RECOMMENDATIONS:   1.  Continue off of intravenous antibiotic therapy  2.  Augmentin 875 mg p.o. daily for chronic suppression of right foot infection  3.  Mobilize  4.  Discharge to rehab      Stef Pete MD  8/6/2022  13:55 EDT

## 2022-08-06 NOTE — PLAN OF CARE
Goal Outcome Evaluation:  Plan of Care Reviewed With: patient           Outcome Evaluation: VSS. Alert oriented x4, occasionally confused. Pain controlled with PO pain medication. Strict I and O, Voiding per external catheter. Will continue to monitor.

## 2022-08-07 LAB
ANION GAP SERPL CALCULATED.3IONS-SCNC: 9 MMOL/L (ref 5–15)
BASOPHILS # BLD AUTO: 0.03 10*3/MM3 (ref 0–0.2)
BASOPHILS NFR BLD AUTO: 0.5 % (ref 0–1.5)
BUN SERPL-MCNC: 60 MG/DL (ref 8–23)
BUN/CREAT SERPL: 52.2 (ref 7–25)
CALCIUM SPEC-SCNC: 9 MG/DL (ref 8.6–10.5)
CHLORIDE SERPL-SCNC: 100 MMOL/L (ref 98–107)
CO2 SERPL-SCNC: 32 MMOL/L (ref 22–29)
CREAT SERPL-MCNC: 1.15 MG/DL (ref 0.57–1)
DEPRECATED RDW RBC AUTO: 58.1 FL (ref 37–54)
EGFRCR SERPLBLD CKD-EPI 2021: 47.4 ML/MIN/1.73
EOSINOPHIL # BLD AUTO: 0.09 10*3/MM3 (ref 0–0.4)
EOSINOPHIL NFR BLD AUTO: 1.5 % (ref 0.3–6.2)
ERYTHROCYTE [DISTWIDTH] IN BLOOD BY AUTOMATED COUNT: 17.2 % (ref 12.3–15.4)
GLUCOSE BLDC GLUCOMTR-MCNC: 145 MG/DL (ref 70–130)
GLUCOSE BLDC GLUCOMTR-MCNC: 193 MG/DL (ref 70–130)
GLUCOSE BLDC GLUCOMTR-MCNC: 232 MG/DL (ref 70–130)
GLUCOSE BLDC GLUCOMTR-MCNC: 255 MG/DL (ref 70–130)
GLUCOSE SERPL-MCNC: 171 MG/DL (ref 65–99)
HCT VFR BLD AUTO: 29 % (ref 34–46.6)
HGB BLD-MCNC: 9.2 G/DL (ref 12–15.9)
IMM GRANULOCYTES # BLD AUTO: 0.08 10*3/MM3 (ref 0–0.05)
IMM GRANULOCYTES NFR BLD AUTO: 1.4 % (ref 0–0.5)
LYMPHOCYTES # BLD AUTO: 0.86 10*3/MM3 (ref 0.7–3.1)
LYMPHOCYTES NFR BLD AUTO: 14.6 % (ref 19.6–45.3)
MCH RBC QN AUTO: 29.3 PG (ref 26.6–33)
MCHC RBC AUTO-ENTMCNC: 31.7 G/DL (ref 31.5–35.7)
MCV RBC AUTO: 92.4 FL (ref 79–97)
MONOCYTES # BLD AUTO: 0.78 10*3/MM3 (ref 0.1–0.9)
MONOCYTES NFR BLD AUTO: 13.2 % (ref 5–12)
NEUTROPHILS NFR BLD AUTO: 4.05 10*3/MM3 (ref 1.7–7)
NEUTROPHILS NFR BLD AUTO: 68.8 % (ref 42.7–76)
NRBC BLD AUTO-RTO: 0 /100 WBC (ref 0–0.2)
PLATELET # BLD AUTO: 220 10*3/MM3 (ref 140–450)
PMV BLD AUTO: 12.1 FL (ref 6–12)
POTASSIUM SERPL-SCNC: 4.1 MMOL/L (ref 3.5–5.2)
RBC # BLD AUTO: 3.14 10*6/MM3 (ref 3.77–5.28)
SODIUM SERPL-SCNC: 141 MMOL/L (ref 136–145)
WBC NRBC COR # BLD: 5.89 10*3/MM3 (ref 3.4–10.8)

## 2022-08-07 PROCEDURE — 80048 BASIC METABOLIC PNL TOTAL CA: CPT | Performed by: NURSE PRACTITIONER

## 2022-08-07 PROCEDURE — 85025 COMPLETE CBC W/AUTO DIFF WBC: CPT | Performed by: NURSE PRACTITIONER

## 2022-08-07 PROCEDURE — 99232 SBSQ HOSP IP/OBS MODERATE 35: CPT | Performed by: NURSE PRACTITIONER

## 2022-08-07 PROCEDURE — 63710000001 INSULIN LISPRO (HUMAN) PER 5 UNITS: Performed by: PHYSICIAN ASSISTANT

## 2022-08-07 PROCEDURE — 25010000002 HEPARIN (PORCINE) PER 1000 UNITS: Performed by: INTERNAL MEDICINE

## 2022-08-07 PROCEDURE — 82962 GLUCOSE BLOOD TEST: CPT

## 2022-08-07 PROCEDURE — 63710000001 INSULIN DETEMIR PER 5 UNITS: Performed by: PHYSICIAN ASSISTANT

## 2022-08-07 RX ADMIN — SENNOSIDES AND DOCUSATE SODIUM 1 TABLET: 50; 8.6 TABLET ORAL at 08:13

## 2022-08-07 RX ADMIN — BUMETANIDE 2 MG: 2 TABLET ORAL at 08:13

## 2022-08-07 RX ADMIN — INSULIN LISPRO 6 UNITS: 100 INJECTION, SOLUTION INTRAVENOUS; SUBCUTANEOUS at 11:48

## 2022-08-07 RX ADMIN — ISOSORBIDE DINITRATE 20 MG: 20 TABLET ORAL at 08:14

## 2022-08-07 RX ADMIN — INSULIN DETEMIR 22 UNITS: 100 INJECTION, SOLUTION SUBCUTANEOUS at 08:15

## 2022-08-07 RX ADMIN — POLYETHYLENE GLYCOL 3350 17 G: 17 POWDER, FOR SOLUTION ORAL at 08:16

## 2022-08-07 RX ADMIN — Medication 10 ML: at 20:04

## 2022-08-07 RX ADMIN — ISOSORBIDE DINITRATE 20 MG: 20 TABLET ORAL at 20:04

## 2022-08-07 RX ADMIN — PREGABALIN 50 MG: 50 CAPSULE ORAL at 20:04

## 2022-08-07 RX ADMIN — AMLODIPINE BESYLATE 5 MG: 5 TABLET ORAL at 08:14

## 2022-08-07 RX ADMIN — VITAM B12 100 MCG: 100 TAB at 08:14

## 2022-08-07 RX ADMIN — HYDRALAZINE HYDROCHLORIDE 25 MG: 25 TABLET, FILM COATED ORAL at 14:20

## 2022-08-07 RX ADMIN — DICLOFENAC 2 G: 10 GEL TOPICAL at 20:05

## 2022-08-07 RX ADMIN — INSULIN LISPRO 2 UNITS: 100 INJECTION, SOLUTION INTRAVENOUS; SUBCUTANEOUS at 11:48

## 2022-08-07 RX ADMIN — BISACODYL 10 MG: 5 TABLET, COATED ORAL at 08:14

## 2022-08-07 RX ADMIN — PREGABALIN 50 MG: 50 CAPSULE ORAL at 08:14

## 2022-08-07 RX ADMIN — HYDRALAZINE HYDROCHLORIDE 25 MG: 25 TABLET, FILM COATED ORAL at 05:09

## 2022-08-07 RX ADMIN — ASPIRIN 81 MG: 81 TABLET, COATED ORAL at 08:14

## 2022-08-07 RX ADMIN — DICLOFENAC 2 G: 10 GEL TOPICAL at 08:16

## 2022-08-07 RX ADMIN — AMOXICILLIN AND CLAVULANATE POTASSIUM 1 TABLET: 875; 125 TABLET, FILM COATED ORAL at 08:14

## 2022-08-07 RX ADMIN — DIAZEPAM 4 MG: 2 TABLET ORAL at 02:15

## 2022-08-07 RX ADMIN — INSULIN LISPRO 6 UNITS: 100 INJECTION, SOLUTION INTRAVENOUS; SUBCUTANEOUS at 08:15

## 2022-08-07 RX ADMIN — DIAZEPAM 4 MG: 2 TABLET ORAL at 14:20

## 2022-08-07 RX ADMIN — POLYETHYLENE GLYCOL 3350 17 G: 17 POWDER, FOR SOLUTION ORAL at 17:45

## 2022-08-07 RX ADMIN — ACETAMINOPHEN 650 MG: 325 TABLET, FILM COATED ORAL at 13:29

## 2022-08-07 RX ADMIN — HYDRALAZINE HYDROCHLORIDE 25 MG: 25 TABLET, FILM COATED ORAL at 20:04

## 2022-08-07 RX ADMIN — CASTOR OIL AND BALSAM, PERU 1 APPLICATION: 788; 87 OINTMENT TOPICAL at 08:16

## 2022-08-07 RX ADMIN — BUMETANIDE 2 MG: 2 TABLET ORAL at 17:44

## 2022-08-07 RX ADMIN — INSULIN LISPRO 4 UNITS: 100 INJECTION, SOLUTION INTRAVENOUS; SUBCUTANEOUS at 17:45

## 2022-08-07 RX ADMIN — CYCLOBENZAPRINE 5 MG: 10 TABLET, FILM COATED ORAL at 08:13

## 2022-08-07 RX ADMIN — ALLOPURINOL 300 MG: 300 TABLET ORAL at 08:14

## 2022-08-07 RX ADMIN — SENNOSIDES AND DOCUSATE SODIUM 1 TABLET: 50; 8.6 TABLET ORAL at 20:04

## 2022-08-07 RX ADMIN — CASTOR OIL AND BALSAM, PERU 1 APPLICATION: 788; 87 OINTMENT TOPICAL at 20:05

## 2022-08-07 RX ADMIN — ATORVASTATIN CALCIUM 80 MG: 40 TABLET, FILM COATED ORAL at 20:04

## 2022-08-07 RX ADMIN — DICLOFENAC 2 G: 10 GEL TOPICAL at 17:45

## 2022-08-07 RX ADMIN — Medication 10 ML: at 08:16

## 2022-08-07 RX ADMIN — INSULIN LISPRO 6 UNITS: 100 INJECTION, SOLUTION INTRAVENOUS; SUBCUTANEOUS at 17:44

## 2022-08-07 RX ADMIN — HEPARIN SODIUM 5000 UNITS: 5000 INJECTION INTRAVENOUS; SUBCUTANEOUS at 08:14

## 2022-08-07 RX ADMIN — HEPARIN SODIUM 5000 UNITS: 5000 INJECTION INTRAVENOUS; SUBCUTANEOUS at 20:04

## 2022-08-07 RX ADMIN — GUAIFENESIN 600 MG: 600 TABLET, EXTENDED RELEASE ORAL at 20:04

## 2022-08-07 RX ADMIN — GUAIFENESIN 600 MG: 600 TABLET, EXTENDED RELEASE ORAL at 08:14

## 2022-08-07 RX ADMIN — DICLOFENAC 2 G: 10 GEL TOPICAL at 11:50

## 2022-08-07 RX ADMIN — LIDOCAINE 1 PATCH: 50 PATCH CUTANEOUS at 08:12

## 2022-08-07 NOTE — PLAN OF CARE
Goal Outcome Evaluation:  Plan of Care Reviewed With: patient           Outcome Evaluation: VSS. Alert, oriented x4. Maintained 1200mL fluid restriction. Specialty bed. Incontinence voiding per external catheter. Pain controlled PO pain medication. Will continue to monitor.

## 2022-08-07 NOTE — PROGRESS NOTES
"   LOS: 11 days    Patient Care Team:  Arleen Doherty MD as PCP - General (Internal Medicine)  Flory Sauer APRN (Nurse Practitioner)  Janeth Lam MD as Consulting Physician (Endocrinology)  Anjum Ceballos MD as Consulting Physician (Cardiology)    Chief Complaint:  SOA    Subjective     Interval History:   Cr 1.1 mg/dl. Dramatic improvement in generalize edema. Confusion significantly better.     Review of Systems:   No CP or SOA , fever, chills, rigors, rash, N/V, Constipation.       Objective     Vital Sign Min/Max for last 24 hours  Temp  Min: 97.1 °F (36.2 °C)  Max: 98.2 °F (36.8 °C)   BP  Min: 137/64  Max: 171/86   Pulse  Min: 72  Max: 97   Resp  Min: 16  Max: 18   SpO2  Min: 90 %  Max: 95 %   Flow (L/min)  Min: 2  Max: 4   No data recorded     Flowsheet Rows    Flowsheet Row First Filed Value   Admission Height 160 cm (63\") Documented at 07/26/2022 2039   Admission Weight 90.7 kg (200 lb) Documented at 07/26/2022 2039          I/O this shift:  In: 100 [P.O.:100]  Out: 500 [Urine:500]  I/O last 3 completed shifts:  In: 720 [P.O.:720]  Out: 2250 [Urine:2250]    Physical Exam:    Gen: Alert, NAD   HENT: NC, AT, EOMI   NECK: Supple, no JVD, Trachea midline   LUNGS: CTA bilaterally, non labored respirtation   CVS: S1/S2 audible, RRR, no murmur   Abd: Soft, NT, ND, BS+   Ext: + pedal edema, no cyanosis   CNS: Alert, No focal deficit noted grossly  Psy: Cooperative  Skin: Warm, dry and intact      WBC WBC   Date Value Ref Range Status   08/07/2022 5.89 3.40 - 10.80 10*3/mm3 Final   08/05/2022 6.16 3.40 - 10.80 10*3/mm3 Final      HGB Hemoglobin   Date Value Ref Range Status   08/07/2022 9.2 (L) 12.0 - 15.9 g/dL Final   08/05/2022 9.3 (L) 12.0 - 15.9 g/dL Final      HCT Hematocrit   Date Value Ref Range Status   08/07/2022 29.0 (L) 34.0 - 46.6 % Final   08/05/2022 28.8 (L) 34.0 - 46.6 % Final      Platlets No results found for: LABPLAT   MCV MCV   Date Value Ref Range Status   08/07/2022 92.4 79.0 - " 97.0 fL Final   08/05/2022 92.6 79.0 - 97.0 fL Final          Sodium Sodium   Date Value Ref Range Status   08/07/2022 141 136 - 145 mmol/L Final   08/06/2022 143 136 - 145 mmol/L Final   08/05/2022 144 136 - 145 mmol/L Final      Potassium Potassium   Date Value Ref Range Status   08/07/2022 4.1 3.5 - 5.2 mmol/L Final   08/06/2022 4.5 3.5 - 5.2 mmol/L Final     Comment:     Slight hemolysis detected by analyzer. Results may be affected.   08/05/2022 4.7 3.5 - 5.2 mmol/L Final     Comment:     Slight hemolysis detected by analyzer. Results may be affected.      Chloride Chloride   Date Value Ref Range Status   08/07/2022 100 98 - 107 mmol/L Final   08/06/2022 102 98 - 107 mmol/L Final   08/05/2022 102 98 - 107 mmol/L Final      CO2 CO2   Date Value Ref Range Status   08/07/2022 32.0 (H) 22.0 - 29.0 mmol/L Final   08/06/2022 29.0 22.0 - 29.0 mmol/L Final   08/05/2022 31.0 (H) 22.0 - 29.0 mmol/L Final      BUN BUN   Date Value Ref Range Status   08/07/2022 60 (H) 8 - 23 mg/dL Final   08/06/2022 62 (H) 8 - 23 mg/dL Final   08/05/2022 65 (H) 8 - 23 mg/dL Final      Creatinine Creatinine   Date Value Ref Range Status   08/07/2022 1.15 (H) 0.57 - 1.00 mg/dL Final   08/06/2022 1.24 (H) 0.57 - 1.00 mg/dL Final   08/05/2022 1.51 (H) 0.57 - 1.00 mg/dL Final      Calcium Calcium   Date Value Ref Range Status   08/07/2022 9.0 8.6 - 10.5 mg/dL Final   08/06/2022 9.2 8.6 - 10.5 mg/dL Final   08/05/2022 8.9 8.6 - 10.5 mg/dL Final      PO4 No results found for: CAPO4   Albumin No results found for: ALBUMIN   Magnesium No results found for: MG   Uric Acid No results found for: URICACID        Results Review:     I reviewed the patient's new clinical results.    allopurinol, 300 mg, Oral, Daily  amLODIPine, 5 mg, Oral, Daily  amoxicillin-clavulanate, 1 tablet, Oral, Daily  aspirin, 81 mg, Oral, Daily  atorvastatin, 80 mg, Oral, Nightly  bumetanide, 2 mg, Oral, BID  castor oil-balsam peru, 1 application, Topical, Q12H  Diclofenac  Sodium, 2 g, Topical, 4x Daily  guaiFENesin, 600 mg, Oral, Q12H  heparin (porcine), 5,000 Units, Subcutaneous, Q12H  hydrALAZINE, 25 mg, Oral, Q8H  insulin detemir, 22 Units, Subcutaneous, Daily  insulin lispro, 0-7 Units, Subcutaneous, TID AC  Insulin Lispro, 6 Units, Subcutaneous, TID With Meals  isosorbide dinitrate, 20 mg, Oral, BID  lidocaine, 1 patch, Transdermal, Q24H  polyethylene glycol, 17 g, Oral, BID  pregabalin, 50 mg, Oral, Q12H  senna-docusate sodium, 1 tablet, Oral, BID  sodium chloride, 10 mL, Intravenous, Q12H  vitamin B-12, 100 mcg, Oral, Daily           Medication Review:     FINDINGS:   The study was limited to due to patient body habitus and limited  cooperation the right kidney has a maximal etyn-ba-jekl length of 11.2  cm. The left kidney has a maximal rqro-qq-dcue length of 12.2 cm. The  echo pattern of both kidneys is normal. There are no masses and there is  no hydronephrosis. The renal veins are patent. Resistive indices on the  right are 0.7 on the left 0.8 maximal renal flow velocities on the right  five and on the left is 13.1 cm/s. The peak systolic velocity in the  aorta is 107 cm/s. The right renal aortic ratio is 0.5 and the left 0.6.        IMPRESSION:     1. Limited study due to the patient's body habitus and lack of  cooperation.  2. Elevated resistive indices which may reflect underlying intrinsic  renal disease.  3. No evidence of obstructive uropathy or significant renal artery  stenosis.      Assessment & Plan       Weakness    PVD (peripheral vascular disease) ([facility])    Essential hypertension    CKD (chronic kidney disease), stage III ([facility])    Mitral valve disease    NICM (nonischemic cardiomyopathy) ([facility])    Coronary artery disease involving native coronary artery of native heart without angina pectoris    Dyslipidemia    Chronic combined systolic and diastolic heart failure ([facility])    Anemia    Fall    Dizziness    Acoustic neuroma ([facility])    Elevated troponin    CHF  exacerbation (HCC)    Elevated serum creatinine      1- LACEY - non oliguric - Ronnie pre renal etiology.  Scr 1.9 at presentation - Resolved. FeUrea- 31% suggestive of pre-renal etiology. Renal duplex no evidence of significant QUEENIE. resolved  2- CKD stage III -baseline Scr 1.3-1.5 range. 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 Not a candidate for SGLT 2 inhibitor.   5- mild hyponatremia - resolved.   6- Anemia - tsat 12%   7- DM type II - not controlled A1c 9.0%- on insulin.   8- Volume overload - improving.   9- low albumin level      Plan    - Continue  bumex 2 mg oral BID for one more day.  Stable for discharge from renal stadnpoint.   . Will need strict I/O to determine response to diuretics.   - Fluid restriction 1.2 lit/day   - Oral iron supplement   - Renal diet.   - Adjust meds per renal function   - No emergent need of RRT.        Cecil Neff MD  08/07/22  13:17 EDT

## 2022-08-07 NOTE — PROGRESS NOTES
[facility] Medicine Services  PROGRESS NOTE    Patient Name: Susan Anderson  : 1940  MRN: [id number]    Date of Admission: 2022  Primary Care Physician: Arleen Doherty MD    Subjective   Subjective     CC:  F/u weakness, constipation     HPI:  Patient is sitting up in bed taking medication. She still has pain in her knee. She was able to lift both arms up in air and lift both legs off bed.     ROS:  Gen- No fevers, chills  CV- No chest pain, palpitations  Resp- No cough, dyspnea  GI- No N/V/D, abd pain        Objective   Objective     Vital Signs:   Temp:  [97.1 °F (36.2 °C)-98.2 °F (36.8 °C)] 97.7 °F (36.5 °C)  Heart Rate:  [72-97] 89  Resp:  [16-18] 18  BP: (139-171)/(63-86) 139/85  Flow (L/min):  [2-4] 2     Physical Exam:  Constitutional: No acute distress, awake, alert  HENT: NCAT, mucous membranes moist  Respiratory: decreased in lissett bases, respiratory effort normal room air 88% oxygen laying by patient nurse checking room air sat   Cardiovascular: RRR, no murmurs, rubs, or gallops  Gastrointestinal: Positive bowel sounds, soft, nontender, nondistended  Musculoskeletal: mild bilateral ankle edema  Psychiatric: flat affect, cooperative  Neurologic: Oriented x 3, strength symmetric in all extremities, Cranial Nerves grossly intact to confrontation, speech clear  Skin: No rashes      Results Reviewed:  LAB RESULTS:      Lab 22  0752 22  0701 22  0708   WBC 6.16 4.72 5.77   HEMOGLOBIN 9.3* 9.7* 9.5*   HEMATOCRIT 28.8* 30.7* 29.5*   PLATELETS 200 147 188   MCV 92.6 91.1 91.6         Lab 22  1418 22  0752 22  0812 22  0854 22  0701 22  0708 22  1647   SODIUM 143 144 141 143 143   < >  --    POTASSIUM 4.5 4.7 4.2 4.5 4.6   < >  --    CHLORIDE 102 102 101 102 104   < >  --    CO2 29.0 31.0* 32.0* 32.0* 31.0*   < >  --    ANION GAP 12.0 11.0 8.0 9.0 8.0   < >  --    BUN 62* 65* 62* 65* 65*   < >  --    CREATININE  1.24* 1.51* 1.39* 1.38* 1.34*   < >  --    EGFR 43.3* 34.2* 37.7* 38.1* 39.4*   < >  --    GLUCOSE 197* 106* 168* 163* 167*   < >  --    CALCIUM 9.2 8.9 8.8 8.7 9.2   < >  --    PHOSPHORUS  --   --   --   --   --   --  4.2    < > = values in this interval not displayed.             Lab 08/03/22  0854   PROBNP 3,452.0*                 Brief Urine Lab Results  (Last result in the past 365 days)      Color   Clarity   Blood   Leuk Est   Nitrite   Protein   CREAT   Urine HCG        07/31/22 1503             27.0               Microbiology Results Abnormal     Procedure Component Value - Date/Time    Blood Culture - Blood, Arm, Right [[id number]]  (Normal) Collected: 07/29/22 1456    Lab Status: Final result Specimen: Blood from Arm, Right Updated: 08/03/22 1517     Blood Culture No growth at 5 days    Blood Culture - Blood, Hand, Left [[id number]]  (Normal) Collected: 07/29/22 1456    Lab Status: Final result Specimen: Blood from Hand, Left Updated: 08/03/22 1517     Blood Culture No growth at 5 days    Body Fluid Culture - Body Fluid, Knee, Right [[id number]] Collected: 07/29/22 1400    Lab Status: Final result Specimen: Body Fluid from Knee, Right Updated: 08/03/22 1009     Body Fluid Culture No growth at 5 days     Gram Stain Rare (1+) WBCs seen      No organisms seen    Eosinophil Smear - Urine, Urine, Clean Catch [[id number]]  (Normal) Collected: 07/31/22 1503    Lab Status: Final result Specimen: Urine, Clean Catch Updated: 07/31/22 1627     Eosinophil Smear 0 % EOS/100 Cells     Narrative:      No eosinophil seen    COVID PRE-OP / PRE-PROCEDURE SCREENING ORDER (NO ISOLATION) - Swab, Nasopharynx [[id number]]  (Normal) Collected: 07/26/22 2300    Lab Status: Final result Specimen: Swab from Nasopharynx Updated: 07/26/22 2340    Narrative:      The following orders were created for panel order COVID PRE-OP / PRE-PROCEDURE SCREENING ORDER (NO ISOLATION) - Swab, Nasopharynx.  Procedure                                Abnormality         Status                     ---------                               -----------         ------                     COVID-19 and FLU A/B PCR...[[id number]]  Normal              Final result                 Please view results for these tests on the individual orders.    COVID-19 and FLU A/B PCR - Swab, Nasopharynx [[id number]]  (Normal) Collected: 07/26/22 2300    Lab Status: Final result Specimen: Swab from Nasopharynx Updated: 07/26/22 2340     COVID19 Not Detected     Influenza A PCR Not Detected     Influenza B PCR Not Detected    Narrative:      Fact sheet for providers: https://www.fda.gov/media/589183/download    Fact sheet for patients: https://www.fda.gov/media/647331/download    Test performed by PCR.          MRI Lumbar Spine Without Contrast    Result Date: 8/6/2022  DATE OF EXAM: 8/6/2022 11:58 AM  PROCEDURE: MRI LUMBAR SPINE WO CONTRAST-  INDICATIONS: Lumbar radiculopathy, symptoms persist with > 6 wks treatment; R53.1-Weakness; W19.XXXA-Unspecified fall, initial encounter; R74.8-Abnormal levels of other serum enzymes; R77.8-Other specified abnormalities of plasma proteins; N18.9-Chronic kidney disease, unspecified; R73.9-Hyperglycemia, unspecified  COMPARISON: MRI lumbar spine February 18, 2022, CT scan lumbar spine July 26, 2022  TECHNIQUE: Routine magnetic resonance imaging of the lumbar spine was performed without the administration of contrast.  FINDINGS: There are some degenerative endplate changes about the L2-3 and L4-5 levels Modic type II. There is a slight anterolisthesis of L4 on L5 of about 4.9 mm. There is marked narrowing of the disc space at L2-3. The conus is around the level of L1. There is a curvature to the lumbar spine with convexity to the right.  T12-L1: There is no disc herniation. There is some suggested narrowing of the intervertebral foramina.  L1-L2: There is broad-based bulging of the annulus. There is some facet arthropathy. The intervertebral foramina  appear patent.  L2-L3: Marked narrowing of the disc space. There is some signal within the disc space that may relate to some fluid or sequela of degenerative change and has been suggested. There is a broad-based impression on the thecal sac which could reflect more of an osseous bar. There is facet arthropathy and ligamentum flavum redundancy resulting in severe central canal stenosis. There is posterior epidural lipomatosis. There is moderate narrowing of the left intervertebral foramina and severe narrowing of the right intervertebral foramina. There is a marginal osteophyte along the right lateral aspect of the disc space.  L3-L4: Broad-based bulging of the annulus. There is facet arthropathy and ligament flavum redundancy resulting in moderate to severe central canal stenosis. There is moderate narrowing of both intervertebral foramina.  L4-5: There is an anterolisthesis of L4 on L5. There is superimposed bulging of the annulus. There is advanced facet arthropathy. It looks like there is a marginal osteophyte along the left lateral aspect of the disc space. There is moderate narrowing of the right intervertebral foramina and severe narrowing of the left intervertebral foramina. There is severe narrowing of the central canal.  L5-S1: There is bulging of the annulus. There is a marginal osteophyte along left lateral aspect of the disc space. There is facet arthropathy. There is moderate narrowing of the intervertebral foramina. There is some epidural lipomatosis at this level.        Impression: 1.  Multilevel advanced degenerative changes similar to the more recent imaging.  This report was finalized on 8/6/2022 12:33 PM by Fam Hunt MD.      XR Chest 1 View    Result Date: 8/6/2022  DATE OF EXAM: 8/6/2022 10:39 AM  PROCEDURE: XR CHEST 1 VW-  INDICATIONS: short of breath; R53.1-Weakness; W19.XXXA-Unspecified fall, initial encounter; R74.8-Abnormal levels of other serum enzymes; R77.8-Other specified  abnormalities of plasma proteins; N18.9-Chronic kidney disease, unspecified; R73.9-Hyperglycemia, unspecified  COMPARISON: August 3, 2022  TECHNIQUE: Single radiographic AP view of the chest was obtained.  FINDINGS: There are bibasilar densities which could relate to effusions. There some prominence of pulmonary vascular markings which could reflect some vascular congestion and edema. The heart looks enlarged.      Impression: 1.  There are findings that could reflect changes from heart failure which have been suggested.  This report was finalized on 8/6/2022 11:16 AM by Fam Hunt MD.        Results for orders placed during the hospital encounter of 07/26/22    Adult Transthoracic Echo Complete W/ Cont if Necessary Per Protocol    Interpretation Summary  · Normal left ventricular systolic function, estimated EF 55%.  · Aortic sclerosis without aortic stenosis or regurgitation.  · Mild mitral regurgitation.      I have reviewed the medications:  Scheduled Meds:allopurinol, 300 mg, Oral, Daily  amLODIPine, 5 mg, Oral, Daily  amoxicillin-clavulanate, 1 tablet, Oral, Daily  aspirin, 81 mg, Oral, Daily  atorvastatin, 80 mg, Oral, Nightly  bumetanide, 2 mg, Oral, BID  castor oil-balsam peru, 1 application, Topical, Q12H  Diclofenac Sodium, 2 g, Topical, 4x Daily  guaiFENesin, 600 mg, Oral, Q12H  heparin (porcine), 5,000 Units, Subcutaneous, Q12H  hydrALAZINE, 25 mg, Oral, Q8H  insulin detemir, 22 Units, Subcutaneous, Daily  insulin lispro, 0-7 Units, Subcutaneous, TID AC  Insulin Lispro, 6 Units, Subcutaneous, TID With Meals  isosorbide dinitrate, 20 mg, Oral, BID  lidocaine, 1 patch, Transdermal, Q24H  polyethylene glycol, 17 g, Oral, BID  pregabalin, 50 mg, Oral, Q12H  senna-docusate sodium, 1 tablet, Oral, BID  sodium chloride, 10 mL, Intravenous, Q12H  vitamin B-12, 100 mcg, Oral, Daily      Continuous Infusions:   PRN Meds:.•  acetaminophen **OR** [DISCONTINUED] acetaminophen **OR** [DISCONTINUED] acetaminophen  •   bisacodyl  •  bisacodyl  •  cyclobenzaprine  •  dextrose  •  dextrose  •  diazePAM  •  glucagon (human recombinant)  •  magnesium hydroxide  •  ondansetron **OR** ondansetron  •  [COMPLETED] Insert peripheral IV **AND** sodium chloride  •  sodium chloride    Assessment & Plan   Assessment & Plan     Active Hospital Problems    Diagnosis  POA   • **Weakness [R53.1]  Yes   • Anemia [D64.9]  Unknown   • Fall [W19.XXXA]  Unknown   • Dizziness [R42]  Unknown   • Acoustic neuroma (HCC) [D33.3]  Unknown   • Elevated troponin [R77.8]  Unknown   • CHF exacerbation (HCC) [I50.9]  Unknown   • Elevated serum creatinine [R79.89]  Unknown   • NICM (nonischemic cardiomyopathy) ([facility]) [I42.8]  Yes   • Coronary artery disease involving native coronary artery of native heart without angina pectoris [I25.10]  Yes   • Dyslipidemia [E78.5]  Yes   • Chronic combined systolic and diastolic heart failure (HCC) [I50.42]  Yes   • Mitral valve disease [I05.9]  Yes   • PVD (peripheral vascular disease) ([facility]) [I73.9]  Yes   • Essential hypertension [I10]  Yes   • CKD (chronic kidney disease), stage III ([facility]) [N18.30]  Yes      Resolved Hospital Problems   No resolved problems to display.        Brief Hospital Course to date:  Susan Andesron is a 82 y.o. female with PMH significant for HTN, HLD, CAD, non-ischemic cardiomyopathy, chronic combined systolic/diastolic CHF, CKD III, PVD, insulin-dependent DMII, ELIUD (on 2L NC QHS, chronic BLE edema, and acoustic neuroma. She was admitted to [facility] 7/26/22 for mild rhabdomyolysis after a fall at home. Also found to have a/c CHF exacerbation and LACEY.      This patient's problems and plans were partially entered by my partner and updated as appropriate by me 08/07/22.     Fall at home  Generalized weakness  - Fall at home in bathroom when she opted not to wait for her bath aide to help her  - Cardiology has evaluated and feel her falls are likely mechanical and not cardiac. May have  some orthostasis. Patient does report she feels better with SBP in 150's, so allowing some permissive hypertension   - PT/OT following - [facility] [place] has accepted pending medical readiness      Rhabdomyolysis, resolved   -  on admission, s/p IV fluids      Acute on chronic combined systolic/diastolic CHF  Nonischemic cardiomyopathy   LACEY on CKD III - baseline 1.3-1.5  - Appreciate nephrology assistance  - 8/1 renal artery duplex suggestive of intrinsic disease but no significant renal artery stenosis or obstructive uropathy   - Creatinine 1.9 on admission, has since returned to baseline  - CXR 8/3 showed congestive heart failure with moderate bibasilar effusion/atelectasis  - Nephrology diuresing, now on Bumex 1mg BID  - s/p IV albumin   - Fluid restriction per nephrology      Blood culture (+) MRSA, suspected contaminant   - 7/26 - 2 of 2 blood cultures (+) staph epidermis, 1 of 2 positive for MRSA  - Repeat blood cultures on 7/31 NGTD  - Appreciate ID assistance. No source of infection has been identified.  - Discussed with ID on 8/1, suspect contaminant. Antibiotics stopped     Right knee effusion   - Orthopedic surgery has evaluated  - Joint aspiration not consistent with infection. Culture NGTD  - MRI of R knee does not suggest structural injury to the knee  - WBAT. Knee immobilizer on for comfort  - Follow up with Dr. Pearce in 2-3 weeks      Low back spasms   - PRN Robaxin ineffective per patient  -  Baclofen to 5mg BID PRN  - Continue Oxycodone 2.5 but decrease to Q6H PRN  -  Valium 4mg QHS PRN only      Chronic anemia   - PO iron supplement      Insulin-dependent DMII   Diabetic peripheral neuropathy  - Hgb A1c 9.0%  - Continue Levemir 22 units daily and Lispro 6 units TID AC + SSI  - Continue Lyrica 50mg BID      History of R great toe osteomyelitis  - s/p TMA 4/2021 by Dr. Finney.   - Culture (+) MSSA  - Has been on chronic suppressive Augmentin for ~1 year  - Back on Augmentin per ID       Dizziness  Acoustic neuroma, chronic  - ?contributing to falls  - Had initial w/u for neuroma at [facility]  - Has not had recent follow up imaging, consider MRI with/without contrast if GFR continues to improve prior to DC     Essential hypertension  - Beta blocker stopped due to bradycardia  - Restarted Lipitor now that Daptomycin has been stopped  - Continue Amlodipine 5mg daily, Bumex 2mg BID, Hydralazine 25mg TID, Isordil 20mg bid,      Constipation  - Continue Miralax / Senna  - Make Miralax BID       Expected Discharge Location and Transportation: [facility] via EMS or medical transport van   Expected Discharge Date: 8/9    DVT prophylaxis:  Medical DVT prophylaxis orders are present.     AM-PAC 6 Clicks Score (PT): 12 (08/06/22 0800)    CODE STATUS:   Code Status and Medical Interventions:   Ordered at: 07/27/22 0143     Code Status (Patient has no pulse and is not breathing):    CPR (Attempt to Resuscitate)     Medical Interventions (Patient has pulse or is breathing):    Full Support       Noemy Smith, APRN  08/07/22

## 2022-08-07 NOTE — PLAN OF CARE
Goal Outcome Evaluation:  Plan of Care Reviewed With: patient        Progress: no change     Pt slept on/off throughout the night. VSS, 2-3 L NC. Voiding spontaneously via purewick.

## 2022-08-08 LAB
GLUCOSE BLDC GLUCOMTR-MCNC: 202 MG/DL (ref 70–130)
GLUCOSE BLDC GLUCOMTR-MCNC: 220 MG/DL (ref 70–130)
GLUCOSE BLDC GLUCOMTR-MCNC: 268 MG/DL (ref 70–130)
GLUCOSE BLDC GLUCOMTR-MCNC: 283 MG/DL (ref 70–130)

## 2022-08-08 PROCEDURE — 97110 THERAPEUTIC EXERCISES: CPT | Performed by: OCCUPATIONAL THERAPIST

## 2022-08-08 PROCEDURE — 63710000001 INSULIN DETEMIR PER 5 UNITS: Performed by: PHYSICIAN ASSISTANT

## 2022-08-08 PROCEDURE — 97530 THERAPEUTIC ACTIVITIES: CPT | Performed by: OCCUPATIONAL THERAPIST

## 2022-08-08 PROCEDURE — 99232 SBSQ HOSP IP/OBS MODERATE 35: CPT | Performed by: NURSE PRACTITIONER

## 2022-08-08 PROCEDURE — 63710000001 INSULIN LISPRO (HUMAN) PER 5 UNITS: Performed by: PHYSICIAN ASSISTANT

## 2022-08-08 PROCEDURE — 82962 GLUCOSE BLOOD TEST: CPT

## 2022-08-08 PROCEDURE — 94799 UNLISTED PULMONARY SVC/PX: CPT

## 2022-08-08 PROCEDURE — 25010000002 HEPARIN (PORCINE) PER 1000 UNITS: Performed by: INTERNAL MEDICINE

## 2022-08-08 RX ORDER — BUMETANIDE 1 MG/1
1 TABLET ORAL
Status: COMPLETED | OUTPATIENT
Start: 2022-08-08 | End: 2022-08-08

## 2022-08-08 RX ORDER — IPRATROPIUM BROMIDE AND ALBUTEROL SULFATE 2.5; .5 MG/3ML; MG/3ML
3 SOLUTION RESPIRATORY (INHALATION) EVERY 6 HOURS PRN
Status: DISCONTINUED | OUTPATIENT
Start: 2022-08-08 | End: 2022-08-12 | Stop reason: HOSPADM

## 2022-08-08 RX ORDER — ECHINACEA PURPUREA EXTRACT 125 MG
1 TABLET ORAL AS NEEDED
Status: DISCONTINUED | OUTPATIENT
Start: 2022-08-08 | End: 2022-08-12 | Stop reason: HOSPADM

## 2022-08-08 RX ORDER — INSULIN LISPRO 100 [IU]/ML
8 INJECTION, SOLUTION INTRAVENOUS; SUBCUTANEOUS
Status: DISCONTINUED | OUTPATIENT
Start: 2022-08-09 | End: 2022-08-10

## 2022-08-08 RX ORDER — BUMETANIDE 2 MG/1
2 TABLET ORAL DAILY
Status: DISCONTINUED | OUTPATIENT
Start: 2022-08-09 | End: 2022-08-08

## 2022-08-08 RX ORDER — GUAIFENESIN 600 MG/1
1200 TABLET, EXTENDED RELEASE ORAL EVERY 12 HOURS SCHEDULED
Status: DISCONTINUED | OUTPATIENT
Start: 2022-08-08 | End: 2022-08-12 | Stop reason: HOSPADM

## 2022-08-08 RX ADMIN — CASTOR OIL AND BALSAM, PERU 1 APPLICATION: 788; 87 OINTMENT TOPICAL at 20:44

## 2022-08-08 RX ADMIN — BUMETANIDE 1 MG: 2 TABLET ORAL at 17:00

## 2022-08-08 RX ADMIN — PREGABALIN 50 MG: 50 CAPSULE ORAL at 20:42

## 2022-08-08 RX ADMIN — INSULIN LISPRO 4 UNITS: 100 INJECTION, SOLUTION INTRAVENOUS; SUBCUTANEOUS at 11:59

## 2022-08-08 RX ADMIN — ISOSORBIDE DINITRATE 20 MG: 20 TABLET ORAL at 08:22

## 2022-08-08 RX ADMIN — INSULIN DETEMIR 22 UNITS: 100 INJECTION, SOLUTION SUBCUTANEOUS at 08:23

## 2022-08-08 RX ADMIN — ASPIRIN 81 MG: 81 TABLET, COATED ORAL at 08:22

## 2022-08-08 RX ADMIN — PREGABALIN 50 MG: 50 CAPSULE ORAL at 08:22

## 2022-08-08 RX ADMIN — SENNOSIDES AND DOCUSATE SODIUM 1 TABLET: 50; 8.6 TABLET ORAL at 08:22

## 2022-08-08 RX ADMIN — SENNOSIDES AND DOCUSATE SODIUM 1 TABLET: 50; 8.6 TABLET ORAL at 20:42

## 2022-08-08 RX ADMIN — HYDRALAZINE HYDROCHLORIDE 25 MG: 25 TABLET, FILM COATED ORAL at 13:57

## 2022-08-08 RX ADMIN — DICLOFENAC 2 G: 10 GEL TOPICAL at 08:27

## 2022-08-08 RX ADMIN — DICLOFENAC 2 G: 10 GEL TOPICAL at 17:01

## 2022-08-08 RX ADMIN — AMOXICILLIN AND CLAVULANATE POTASSIUM 1 TABLET: 875; 125 TABLET, FILM COATED ORAL at 08:21

## 2022-08-08 RX ADMIN — HYDRALAZINE HYDROCHLORIDE 25 MG: 25 TABLET, FILM COATED ORAL at 20:42

## 2022-08-08 RX ADMIN — CYCLOBENZAPRINE 5 MG: 10 TABLET, FILM COATED ORAL at 14:23

## 2022-08-08 RX ADMIN — HEPARIN SODIUM 5000 UNITS: 5000 INJECTION INTRAVENOUS; SUBCUTANEOUS at 08:24

## 2022-08-08 RX ADMIN — GUAIFENESIN 1200 MG: 600 TABLET, EXTENDED RELEASE ORAL at 20:42

## 2022-08-08 RX ADMIN — Medication 10 ML: at 22:09

## 2022-08-08 RX ADMIN — INSULIN LISPRO 6 UNITS: 100 INJECTION, SOLUTION INTRAVENOUS; SUBCUTANEOUS at 08:24

## 2022-08-08 RX ADMIN — LIDOCAINE 1 PATCH: 50 PATCH CUTANEOUS at 08:22

## 2022-08-08 RX ADMIN — AMLODIPINE BESYLATE 5 MG: 5 TABLET ORAL at 08:22

## 2022-08-08 RX ADMIN — GUAIFENESIN 600 MG: 600 TABLET, EXTENDED RELEASE ORAL at 08:22

## 2022-08-08 RX ADMIN — INSULIN LISPRO 6 UNITS: 100 INJECTION, SOLUTION INTRAVENOUS; SUBCUTANEOUS at 17:01

## 2022-08-08 RX ADMIN — Medication 10 ML: at 08:28

## 2022-08-08 RX ADMIN — CASTOR OIL AND BALSAM, PERU 1 APPLICATION: 788; 87 OINTMENT TOPICAL at 08:39

## 2022-08-08 RX ADMIN — ALLOPURINOL 300 MG: 300 TABLET ORAL at 08:22

## 2022-08-08 RX ADMIN — VITAM B12 100 MCG: 100 TAB at 08:22

## 2022-08-08 RX ADMIN — INSULIN LISPRO 6 UNITS: 100 INJECTION, SOLUTION INTRAVENOUS; SUBCUTANEOUS at 11:59

## 2022-08-08 RX ADMIN — ISOSORBIDE DINITRATE 20 MG: 20 TABLET ORAL at 20:42

## 2022-08-08 RX ADMIN — POLYETHYLENE GLYCOL 3350 17 G: 17 POWDER, FOR SOLUTION ORAL at 17:00

## 2022-08-08 RX ADMIN — DICLOFENAC 2 G: 10 GEL TOPICAL at 20:44

## 2022-08-08 RX ADMIN — HEPARIN SODIUM 5000 UNITS: 5000 INJECTION INTRAVENOUS; SUBCUTANEOUS at 20:42

## 2022-08-08 RX ADMIN — ATORVASTATIN CALCIUM 80 MG: 40 TABLET, FILM COATED ORAL at 20:42

## 2022-08-08 RX ADMIN — HYDRALAZINE HYDROCHLORIDE 25 MG: 25 TABLET, FILM COATED ORAL at 06:59

## 2022-08-08 RX ADMIN — POLYETHYLENE GLYCOL 3350 17 G: 17 POWDER, FOR SOLUTION ORAL at 08:22

## 2022-08-08 RX ADMIN — DICLOFENAC 2 G: 10 GEL TOPICAL at 11:59

## 2022-08-08 RX ADMIN — INSULIN LISPRO 3 UNITS: 100 INJECTION, SOLUTION INTRAVENOUS; SUBCUTANEOUS at 17:00

## 2022-08-08 RX ADMIN — BUMETANIDE 2 MG: 2 TABLET ORAL at 08:21

## 2022-08-08 RX ADMIN — INSULIN LISPRO 3 UNITS: 100 INJECTION, SOLUTION INTRAVENOUS; SUBCUTANEOUS at 08:24

## 2022-08-08 NOTE — PLAN OF CARE
Goal Outcome Evaluation:  Plan of Care Reviewed With: patient        Progress: no change  Outcome Evaluation: Fluid restriction in progress. Glucoses 202, and 283. See MAR. Nebs ordered today every 6 hours as needed. Pt on a specialty bed. Remains sinus on the monitor. O2 at 3 liters per NC. Pain managed with oral medications. Waiting a discharge plan. Will monitor.

## 2022-08-08 NOTE — PLAN OF CARE
Goal Outcome Evaluation:  Plan of Care Reviewed With: patient, friend        Progress: declining  Outcome Evaluation: Pt limited with lethargy and was oriented x3. She required dependence bed mobility, dependence LB dressing and dependence for lift to chair/ Pt limited with generalized weakness, decreased occupational endurance, dyspnea with minimal exertion with supplemental oxygen and decreased BUE AROM and strength. Recommend SNF.

## 2022-08-08 NOTE — CASE MANAGEMENT/SOCIAL WORK
Continued Stay Note  [facility]     Patient Name: Susan Anderson  MRN: [id number]  Today's Date: 8/8/2022    Admit Date: 7/26/2022     Discharge Plan     Row Name 08/08/22 1159       Plan    Plan [facility]    Plan Comments [facility] had had a private room last week in [facility]. Delmi is working on another room. They will need to update her insurance.    Final Discharge Disposition Code 03 - skilled nursing facility (SNF)               Discharge Codes    No documentation.               Expected Discharge Date and Time     Expected Discharge Date Expected Discharge Time    Aug 10, 2022             Sheron Somers RN

## 2022-08-08 NOTE — PROGRESS NOTES
"   LOS: 12 days    Patient Care Team:  Arleen Doherty MD as PCP - General (Internal Medicine)  Flory Sauer APRN (Nurse Practitioner)  Janeth Lam MD as Consulting Physician (Endocrinology)  Anjum Ceballos MD as Consulting Physician (Cardiology)    Chief Complaint:  SOA    Subjective     Interval History:   No new labs.  Dramatic improvement in generalize edema. Confusion significantly better.     Review of Systems:   No CP or SOA , fever, chills, rigors, rash, N/V, Constipation.       Objective     Vital Sign Min/Max for last 24 hours  Temp  Min: 97.3 °F (36.3 °C)  Max: 98.7 °F (37.1 °C)   BP  Min: 132/66  Max: 163/72   Pulse  Min: 65  Max: 82   Resp  Min: 18  Max: 18   SpO2  Min: 91 %  Max: 97 %   Flow (L/min)  Min: 2  Max: 3   No data recorded     Flowsheet Rows    Flowsheet Row First Filed Value   Admission Height 160 cm (63\") Documented at 07/26/2022 2039   Admission Weight 90.7 kg (200 lb) Documented at 07/26/2022 2039          I/O this shift:  In: 337 [P.O.:337]  Out: 700 [Urine:700]  I/O last 3 completed shifts:  In: 395 [P.O.:395]  Out: 1550 [Urine:1550]    Physical Exam:    Gen: Alert, NAD   HENT: NC, AT, EOMI   NECK: Supple, no JVD, Trachea midline   LUNGS: CTA bilaterally, non labored respirtation   CVS: S1/S2 audible, RRR, no murmur   Abd: Soft, NT, ND, BS+   Ext: + pedal edema, no cyanosis   CNS: Alert, No focal deficit noted grossly  Psy: Cooperative  Skin: Warm, dry and intact      WBC WBC   Date Value Ref Range Status   08/07/2022 5.89 3.40 - 10.80 10*3/mm3 Final      HGB Hemoglobin   Date Value Ref Range Status   08/07/2022 9.2 (L) 12.0 - 15.9 g/dL Final      HCT Hematocrit   Date Value Ref Range Status   08/07/2022 29.0 (L) 34.0 - 46.6 % Final      Platlets No results found for: LABPLAT   MCV MCV   Date Value Ref Range Status   08/07/2022 92.4 79.0 - 97.0 fL Final          Sodium Sodium   Date Value Ref Range Status   08/07/2022 141 136 - 145 mmol/L Final   08/06/2022 143 136 - " 145 mmol/L Final      Potassium Potassium   Date Value Ref Range Status   08/07/2022 4.1 3.5 - 5.2 mmol/L Final   08/06/2022 4.5 3.5 - 5.2 mmol/L Final     Comment:     Slight hemolysis detected by analyzer. Results may be affected.      Chloride Chloride   Date Value Ref Range Status   08/07/2022 100 98 - 107 mmol/L Final   08/06/2022 102 98 - 107 mmol/L Final      CO2 CO2   Date Value Ref Range Status   08/07/2022 32.0 (H) 22.0 - 29.0 mmol/L Final   08/06/2022 29.0 22.0 - 29.0 mmol/L Final      BUN BUN   Date Value Ref Range Status   08/07/2022 60 (H) 8 - 23 mg/dL Final   08/06/2022 62 (H) 8 - 23 mg/dL Final      Creatinine Creatinine   Date Value Ref Range Status   08/07/2022 1.15 (H) 0.57 - 1.00 mg/dL Final   08/06/2022 1.24 (H) 0.57 - 1.00 mg/dL Final      Calcium Calcium   Date Value Ref Range Status   08/07/2022 9.0 8.6 - 10.5 mg/dL Final   08/06/2022 9.2 8.6 - 10.5 mg/dL Final      PO4 No results found for: CAPO4   Albumin No results found for: ALBUMIN   Magnesium No results found for: MG   Uric Acid No results found for: URICACID        Results Review:     I reviewed the patient's new clinical results.    allopurinol, 300 mg, Oral, Daily  amLODIPine, 5 mg, Oral, Daily  amoxicillin-clavulanate, 1 tablet, Oral, Daily  aspirin, 81 mg, Oral, Daily  atorvastatin, 80 mg, Oral, Nightly  bumetanide, 2 mg, Oral, BID  [START ON 8/9/2022] bumetanide, 2 mg, Oral, Daily  castor oil-balsam peru, 1 application, Topical, Q12H  Diclofenac Sodium, 2 g, Topical, 4x Daily  guaiFENesin, 1,200 mg, Oral, Q12H  heparin (porcine), 5,000 Units, Subcutaneous, Q12H  hydrALAZINE, 25 mg, Oral, Q8H  insulin detemir, 22 Units, Subcutaneous, Daily  insulin lispro, 0-7 Units, Subcutaneous, TID AC  Insulin Lispro, 6 Units, Subcutaneous, TID With Meals  isosorbide dinitrate, 20 mg, Oral, BID  lidocaine, 1 patch, Transdermal, Q24H  polyethylene glycol, 17 g, Oral, BID  pregabalin, 50 mg, Oral, Q12H  senna-docusate sodium, 1 tablet, Oral,  BID  sodium chloride, 10 mL, Intravenous, Q12H  vitamin B-12, 100 mcg, Oral, Daily           Medication Review:     FINDINGS:   The study was limited to due to patient body habitus and limited  cooperation the right kidney has a maximal syuf-wg-heon length of 11.2  cm. The left kidney has a maximal eqnw-jo-qpcj length of 12.2 cm. The  echo pattern of both kidneys is normal. There are no masses and there is  no hydronephrosis. The renal veins are patent. Resistive indices on the  right are 0.7 on the left 0.8 maximal renal flow velocities on the right  five and on the left is 13.1 cm/s. The peak systolic velocity in the  aorta is 107 cm/s. The right renal aortic ratio is 0.5 and the left 0.6.        IMPRESSION:     1. Limited study due to the patient's body habitus and lack of  cooperation.  2. Elevated resistive indices which may reflect underlying intrinsic  renal disease.  3. No evidence of obstructive uropathy or significant renal artery  stenosis.      Assessment & Plan       Weakness    PVD (peripheral vascular disease) ([facility])    Essential hypertension    CKD (chronic kidney disease), stage III ([facility])    Mitral valve disease    NICM (nonischemic cardiomyopathy) ([facility])    Coronary artery disease involving native coronary artery of native heart without angina pectoris    Dyslipidemia    Chronic combined systolic and diastolic heart failure (HCC)    Anemia    Fall    Dizziness    Acoustic neuroma (HCC)    Elevated troponin    CHF exacerbation (HCC)    Elevated serum creatinine      1- LACEY - non oliguric - Likley pre renal etiology.  Scr 1.9 at presentation - Resolved. FeUrea- 31% suggestive of pre-renal etiology. Renal duplex no evidence of significant QUEENIE. resolved  2- CKD stage III -baseline Scr 1.3-1.5 range. 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 Not a candidate for SGLT 2 inhibitor.   5- mild hyponatremia - resolved.   6- Anemia - tsat 12%   7- DM type II - not  controlled A1c 9.0%- on insulin.   8- Volume overload - improving.   9- low albumin level      Plan    - Reduce  bumex 1 mg oral BID.  Stable for discharge from renal stadnpoint.   . Will need strict I/O to determine response to diuretics.   - Fluid restriction 1.2 lit/day   - Oral iron supplement   - Renal diet.   - Adjust meds per renal function   - No emergent need of RRT.        Cecil Neff MD  08/08/22  14:46 EDT       [place]

## 2022-08-08 NOTE — PROGRESS NOTES
[facility] Medicine Services  PROGRESS NOTE    Patient Name: Susan Anderson  : 1940  MRN: [id number]    Date of Admission: 2022  Primary Care Physician: Arleen Doherty MD    Subjective   Subjective     CC:  F/u weakness, constipation     HPI:  Patient is resting in bed in NAD. She states she feels cold. She was able to lift legs off bed. States she didn't have a good night. Ready for rehab     ROS:  Gen- No fevers, chills  CV- No chest pain, palpitations  Resp- No cough, dyspnea  GI- No N/V/D, abd pain        Objective   Objective     Vital Signs:   Temp:  [97.3 °F (36.3 °C)-98.7 °F (37.1 °C)] 97.5 °F (36.4 °C)  Heart Rate:  [65-82] 82  Resp:  [18] 18  BP: (132-163)/(59-73) 156/69  Flow (L/min):  [2] 2     Physical Exam:  Constitutional: No acute distress, awake, alert  HENT: NCAT, mucous membranes moist  Respiratory: decreased in lissett bases, respiratory effort normal 2-3 L 92%  Cardiovascular: RRR, no murmurs, rubs, or gallops  Gastrointestinal: Positive bowel sounds, soft, nontender, nondistended  Musculoskeletal: mild bilateral ankle edema  Psychiatric: flat affect, cooperative  Neurologic: Oriented x 3, strength symmetric in all extremities,generalized weakness, Cranial Nerves grossly intact to confrontation, speech clear  Skin: No rashes      Results Reviewed:  LAB RESULTS:      Lab 22  07522  0701   WBC 5.89 6.16 4.72   HEMOGLOBIN 9.2* 9.3* 9.7*   HEMATOCRIT 29.0* 28.8* 30.7*   PLATELETS 220 200 147   NEUTROS ABS 4.05  --   --    IMMATURE GRANS (ABS) 0.08*  --   --    LYMPHS ABS 0.86  --   --    MONOS ABS 0.78  --   --    EOS ABS 0.09  --   --    MCV 92.4 92.6 91.1         Lab 22  0933 22  1418 22  0752 22  0812 22  0854   SODIUM 141 143 144 141 143   POTASSIUM 4.1 4.5 4.7 4.2 4.5   CHLORIDE 100 102 102 101 102   CO2 32.0* 29.0 31.0* 32.0* 32.0*   ANION GAP 9.0 12.0 11.0 8.0 9.0   BUN 60* 62* 65* 62* 65*    CREATININE 1.15* 1.24* 1.51* 1.39* 1.38*   EGFR 47.4* 43.3* 34.2* 37.7* 38.1*   GLUCOSE 171* 197* 106* 168* 163*   CALCIUM 9.0 9.2 8.9 8.8 8.7             Lab 08/03/22  0854   PROBNP 3,452.0*                 Brief Urine Lab Results  (Last result in the past 365 days)      Color   Clarity   Blood   Leuk Est   Nitrite   Protein   CREAT   Urine HCG        07/31/22 1503             27.0               Microbiology Results Abnormal     Procedure Component Value - Date/Time    Blood Culture - Blood, Arm, Right [[id number]]  (Normal) Collected: 07/29/22 1456    Lab Status: Final result Specimen: Blood from Arm, Right Updated: 08/03/22 1517     Blood Culture No growth at 5 days    Blood Culture - Blood, Hand, Left [[id number]]  (Normal) Collected: 07/29/22 1456    Lab Status: Final result Specimen: Blood from Hand, Left Updated: 08/03/22 1517     Blood Culture No growth at 5 days    Body Fluid Culture - Body Fluid, Knee, Right [[id number]] Collected: 07/29/22 1400    Lab Status: Final result Specimen: Body Fluid from Knee, Right Updated: 08/03/22 1009     Body Fluid Culture No growth at 5 days     Gram Stain Rare (1+) WBCs seen      No organisms seen    Eosinophil Smear - Urine, Urine, Clean Catch [[id number]]  (Normal) Collected: 07/31/22 1503    Lab Status: Final result Specimen: Urine, Clean Catch Updated: 07/31/22 1627     Eosinophil Smear 0 % EOS/100 Cells     Narrative:      No eosinophil seen    COVID PRE-OP / PRE-PROCEDURE SCREENING ORDER (NO ISOLATION) - Swab, Nasopharynx [[id number]]  (Normal) Collected: 07/26/22 2300    Lab Status: Final result Specimen: Swab from Nasopharynx Updated: 07/26/22 2340    Narrative:      The following orders were created for panel order COVID PRE-OP / PRE-PROCEDURE SCREENING ORDER (NO ISOLATION) - Swab, Nasopharynx.  Procedure                               Abnormality         Status                     ---------                               -----------         ------                      COVID-19 and FLU A/B PCR...[[id number]]  Normal              Final result                 Please view results for these tests on the individual orders.    COVID-19 and FLU A/B PCR - Swab, Nasopharynx [[id number]]  (Normal) Collected: 07/26/22 2300    Lab Status: Final result Specimen: Swab from Nasopharynx Updated: 07/26/22 2340     COVID19 Not Detected     Influenza A PCR Not Detected     Influenza B PCR Not Detected    Narrative:      Fact sheet for providers: https://www.fda.gov/media/720080/download    Fact sheet for patients: https://www.fda.gov/media/228894/download    Test performed by PCR.          MRI Lumbar Spine Without Contrast    Result Date: 8/6/2022  DATE OF EXAM: 8/6/2022 11:58 AM  PROCEDURE: MRI LUMBAR SPINE WO CONTRAST-  INDICATIONS: Lumbar radiculopathy, symptoms persist with > 6 wks treatment; R53.1-Weakness; W19.XXXA-Unspecified fall, initial encounter; R74.8-Abnormal levels of other serum enzymes; R77.8-Other specified abnormalities of plasma proteins; N18.9-Chronic kidney disease, unspecified; R73.9-Hyperglycemia, unspecified  COMPARISON: MRI lumbar spine February 18, 2022, CT scan lumbar spine July 26, 2022  TECHNIQUE: Routine magnetic resonance imaging of the lumbar spine was performed without the administration of contrast.  FINDINGS: There are some degenerative endplate changes about the L2-3 and L4-5 levels Modic type II. There is a slight anterolisthesis of L4 on L5 of about 4.9 mm. There is marked narrowing of the disc space at L2-3. The conus is around the level of L1. There is a curvature to the lumbar spine with convexity to the right.  T12-L1: There is no disc herniation. There is some suggested narrowing of the intervertebral foramina.  L1-L2: There is broad-based bulging of the annulus. There is some facet arthropathy. The intervertebral foramina appear patent.  L2-L3: Marked narrowing of the disc space. There is some signal within the disc space that may relate to some fluid or  sequela of degenerative change and has been suggested. There is a broad-based impression on the thecal sac which could reflect more of an osseous bar. There is facet arthropathy and ligamentum flavum redundancy resulting in severe central canal stenosis. There is posterior epidural lipomatosis. There is moderate narrowing of the left intervertebral foramina and severe narrowing of the right intervertebral foramina. There is a marginal osteophyte along the right lateral aspect of the disc space.  L3-L4: Broad-based bulging of the annulus. There is facet arthropathy and ligament flavum redundancy resulting in moderate to severe central canal stenosis. There is moderate narrowing of both intervertebral foramina.  L4-5: There is an anterolisthesis of L4 on L5. There is superimposed bulging of the annulus. There is advanced facet arthropathy. It looks like there is a marginal osteophyte along the left lateral aspect of the disc space. There is moderate narrowing of the right intervertebral foramina and severe narrowing of the left intervertebral foramina. There is severe narrowing of the central canal.  L5-S1: There is bulging of the annulus. There is a marginal osteophyte along left lateral aspect of the disc space. There is facet arthropathy. There is moderate narrowing of the intervertebral foramina. There is some epidural lipomatosis at this level.        Impression: 1.  Multilevel advanced degenerative changes similar to the more recent imaging.  This report was finalized on 8/6/2022 12:33 PM by Fam Hunt MD.      XR Chest 1 View    Result Date: 8/6/2022  DATE OF EXAM: 8/6/2022 10:39 AM  PROCEDURE: XR CHEST 1 VW-  INDICATIONS: short of breath; R53.1-Weakness; W19.XXXA-Unspecified fall, initial encounter; R74.8-Abnormal levels of other serum enzymes; R77.8-Other specified abnormalities of plasma proteins; N18.9-Chronic kidney disease, unspecified; R73.9-Hyperglycemia, unspecified  COMPARISON: August 3, 2022   TECHNIQUE: Single radiographic AP view of the chest was obtained.  FINDINGS: There are bibasilar densities which could relate to effusions. There some prominence of pulmonary vascular markings which could reflect some vascular congestion and edema. The heart looks enlarged.      Impression: 1.  There are findings that could reflect changes from heart failure which have been suggested.  This report was finalized on 8/6/2022 11:16 AM by Fam Hunt MD.        Results for orders placed during the hospital encounter of 07/26/22    Adult Transthoracic Echo Complete W/ Cont if Necessary Per Protocol    Interpretation Summary  · Normal left ventricular systolic function, estimated EF 55%.  · Aortic sclerosis without aortic stenosis or regurgitation.  · Mild mitral regurgitation.      I have reviewed the medications:  Scheduled Meds:allopurinol, 300 mg, Oral, Daily  amLODIPine, 5 mg, Oral, Daily  amoxicillin-clavulanate, 1 tablet, Oral, Daily  aspirin, 81 mg, Oral, Daily  atorvastatin, 80 mg, Oral, Nightly  bumetanide, 2 mg, Oral, BID  castor oil-balsam peru, 1 application, Topical, Q12H  Diclofenac Sodium, 2 g, Topical, 4x Daily  guaiFENesin, 1,200 mg, Oral, Q12H  heparin (porcine), 5,000 Units, Subcutaneous, Q12H  hydrALAZINE, 25 mg, Oral, Q8H  insulin detemir, 22 Units, Subcutaneous, Daily  insulin lispro, 0-7 Units, Subcutaneous, TID AC  Insulin Lispro, 6 Units, Subcutaneous, TID With Meals  isosorbide dinitrate, 20 mg, Oral, BID  lidocaine, 1 patch, Transdermal, Q24H  polyethylene glycol, 17 g, Oral, BID  pregabalin, 50 mg, Oral, Q12H  senna-docusate sodium, 1 tablet, Oral, BID  sodium chloride, 10 mL, Intravenous, Q12H  vitamin B-12, 100 mcg, Oral, Daily      Continuous Infusions:   PRN Meds:.•  acetaminophen **OR** [DISCONTINUED] acetaminophen **OR** [DISCONTINUED] acetaminophen  •  bisacodyl  •  bisacodyl  •  cyclobenzaprine  •  dextrose  •  dextrose  •  diazePAM  •  glucagon (human recombinant)  •   ipratropium-albuterol  •  magnesium hydroxide  •  ondansetron **OR** ondansetron  •  Polyvinyl Alcohol-Povidone PF  •  [COMPLETED] Insert peripheral IV **AND** sodium chloride  •  sodium chloride  •  sodium chloride    Assessment & Plan   Assessment & Plan     Active Hospital Problems    Diagnosis  POA   • **Weakness [R53.1]  Yes   • Anemia [D64.9]  Unknown   • Fall [W19.XXXA]  Unknown   • Dizziness [R42]  Unknown   • Acoustic neuroma (HCC) [D33.3]  Unknown   • Elevated troponin [R77.8]  Unknown   • CHF exacerbation (HCC) [I50.9]  Unknown   • Elevated serum creatinine [R79.89]  Unknown   • NICM (nonischemic cardiomyopathy) ([facility]) [I42.8]  Yes   • Coronary artery disease involving native coronary artery of native heart without angina pectoris [I25.10]  Yes   • Dyslipidemia [E78.5]  Yes   • Chronic combined systolic and diastolic heart failure (HCC) [I50.42]  Yes   • Mitral valve disease [I05.9]  Yes   • PVD (peripheral vascular disease) ([facility]) [I73.9]  Yes   • Essential hypertension [I10]  Yes   • CKD (chronic kidney disease), stage III ([facility]) [N18.30]  Yes      Resolved Hospital Problems   No resolved problems to display.        Brief Hospital Course to date:  Susan Anderson is a 82 y.o. female with PMH significant for HTN, HLD, CAD, non-ischemic cardiomyopathy, chronic combined systolic/diastolic CHF, CKD III, PVD, insulin-dependent DMII, ELIUD (on 2L NC QHS, chronic BLE edema, and acoustic neuroma. She was admitted to [facility] 7/26/22 for mild rhabdomyolysis after a fall at home. Also found to have a/c CHF exacerbation and LACEY.      This patient's problems and plans were partially entered by my partner and updated as appropriate by me 08/08/22.     Fall at home  Generalized weakness  - Fall at home in bathroom when she opted not to wait for her bath aide to help her  - Cardiology has evaluated and feel her falls are likely mechanical and not cardiac. May have some orthostasis. Patient does report she  feels better with SBP in 150's, so allowing some permissive hypertension   - PT/OT following - [facility] has accepted pending medical readiness      Rhabdomyolysis, resolved   -  on admission, s/p IV fluids      Acute on chronic combined systolic/diastolic CHF  Nonischemic cardiomyopathy   LACEY on CKD III - baseline 1.3-1.5  - Appreciate nephrology assistance  - 8/1 renal artery duplex suggestive of intrinsic disease but no significant renal artery stenosis or obstructive uropathy   - Creatinine 1.9 on admission, has since returned to baseline  - CXR 8/3 showed congestive heart failure with moderate bibasilar effusion/atelectasis  - Nephrology diuresing, now on Bumex 1mg BID, changed back to home dose of daily on 8/9  - s/p IV albumin   - Fluid restriction per nephrology      Blood culture (+) MRSA, suspected contaminant   - 7/26 - 2 of 2 blood cultures (+) staph epidermis, 1 of 2 positive for MRSA  - Repeat blood cultures on 7/31 NGTD  - Appreciate ID assistance. No source of infection has been identified.  - Discussed with ID on 8/1, suspect contaminant. Antibiotics stopped     Right knee effusion   - Orthopedic surgery has evaluated  - Joint aspiration not consistent with infection. Culture NGTD  - MRI of R knee does not suggest structural injury to the knee  - WBAT. Knee immobilizer on for comfort  - Follow up with Dr. Pearce in 2-3 weeks      Low back spasms   - PRN Robaxin ineffective per patient  -  Baclofen to 5mg BID PRN  - Continue Oxycodone 2.5 but decrease to Q6H PRN  -  Valium 4mg QHS PRN only      Chronic anemia   - PO iron supplement      Insulin-dependent DMII   Diabetic peripheral neuropathy  - Hgb A1c 9.0%  - Continue Levemir 22 units daily and Lispro 6 units TID AC + SSI  - Continue Lyrica 50mg BID      History of R great toe osteomyelitis  - s/p TMA 4/2021 by Dr. Finney.   - Culture (+) MSSA  - Has been on chronic suppressive Augmentin for ~1 year  - Back on Augmentin per ID       Dizziness  Acoustic neuroma, chronic  - ?contributing to falls  - Had initial w/u for neuroma at [facility]  - Has not had recent follow up imaging, consider MRI with/without contrast if GFR continues to improve prior to DC     Essential hypertension  - Beta blocker stopped due to bradycardia  - Restarted Lipitor now that Daptomycin has been stopped  - Continue Amlodipine 5mg daily, Bumex 2mg BID, Hydralazine 25mg TID, Isordil 20mg bid,      Constipation  - Continue Miralax / Senna  --  8/7       Expected Discharge Location and Transportation: [facility] via EMS or medical transport van   Expected Discharge Date: 8/9    DVT prophylaxis:  Medical DVT prophylaxis orders are present.     AM-PAC 6 Clicks Score (PT): 12 (08/08/22 0750)    CODE STATUS:   Code Status and Medical Interventions:   Ordered at: 07/27/22 0143     Code Status (Patient has no pulse and is not breathing):    CPR (Attempt to Resuscitate)     Medical Interventions (Patient has pulse or is breathing):    Full Support       Noemy Smith, INDIGO  08/08/22

## 2022-08-08 NOTE — PLAN OF CARE
Goal Outcome Evaluation:  Plan of Care Reviewed With: patient        Progress: no change     Pt is alert and oriented X 4. VSS. 2L NC. BM this morning.

## 2022-08-08 NOTE — THERAPY PROGRESS REPORT/RE-CERT
Patient Name: Susan Anderson  : 1940    MRN: [id number]                              Today's Date: 2022       Admit Date: 2022    Visit Dx:     ICD-10-CM ICD-9-CM   1. Weakness  R53.1 780.79   2. Fall, initial encounter  W19.XXXA E888.9   3. Elevated CK  R74.8 790.5   4. Elevated troponin  R77.8 790.6   5. Chronic renal failure, unspecified CKD stage  N18.9 585.9   6. Hyperglycemia  R73.9 790.29     Patient Active Problem List   Diagnosis   • Diabetic foot ulcer ([facility])   • PVD (peripheral vascular disease) ([facility])   • Osteomyelitis ([facility])   • Diabetes mellitus with neuropathy ([facility])   • Essential hypertension   • CKD (chronic kidney disease), stage III ([facility])   • Pyogenic inflammation of bone ([facility])   • Hyperglycemia   • Pneumonia due to infectious organism   • Mitral valve disease   • NICM (nonischemic cardiomyopathy) ([facility])   • Coronary artery disease involving native coronary artery of native heart without angina pectoris   • Dyslipidemia   • Chronic combined systolic and diastolic heart failure ([facility])   • Lumbar stenosis with neurogenic claudication   • Spondylosis of lumbar region without myelopathy or radiculopathy   • Spondylolisthesis, lumbar region   • Degeneration of lumbar or lumbosacral intervertebral disc   • Gait disturbance   • Physical deconditioning   • Sarcopenia   • Weakness   • Anemia   • Fall   • Dizziness   • Acoustic neuroma ([facility])   • Elevated troponin   • CHF exacerbation ([facility])   • Elevated serum creatinine     Past Medical History:   Diagnosis Date   • Arthritis    • Asthma  - double pneumonia    Currently on inhaler and nebulizer   • Cancer ([facility])     cervical cancer, skin cancer   • CHF (congestive heart failure) ([facility]) 2021   • Chronic kidney disease Related to diabetes   • Coronary artery disease 2021 DX for hear failure   • Diabetes mellitus ([facility]) 30 years    Seeing Dr. Lam 1st time Aug 19   • Gout    • Hx of colonoscopy    • Hyperlipidemia Reference current labs  x 2-3yrs approx   • Hypertension 30 years   • Migraine    • Mitral valve disease    • Mitral valve disease    • Osteomyelitis ([facility])    • Peripheral neuropathy    • Sleep apnea    • Type 2 diabetes mellitus ([facility])     30 years     Past Surgical History:   Procedure Laterality Date   • ABDOMINAL HYSTERECTOMY W/SALPINGECTOMY     • AMPUTATION  Right great toe, 1st 1/3 metatarsal -Reg 4/14/21   • AORTAGRAM N/A 4/9/2021    Procedure: AORTAGRAM WITH OR WITHOUT RUNOFFS, WITH Co2;  Surgeon: Trey Forrest MD;  Location: [facility];  Service: Vascular;  Laterality: N/A;   • APPENDECTOMY     • BRAIN TUMOR EXCISION      laser surgery    • CARDIAC CATHETERIZATION N/A 6/21/2021    Procedure: LEFT HEART CATH;  Surgeon: Anjum Ceballos MD;  Location: [facility] CATH INVASIVE LOCATION;  Service: Cardiology;  Laterality: N/A;   • CATARACT EXTRACTION W/ INTRAOCULAR LENS  IMPLANT, BILATERAL     • CHOLECYSTECTOMY     • EYE SURGERY     • HYSTERECTOMY     • TOE SURGERY     • TRANS METATARSAL AMPUTATION Right 4/14/2021    Procedure: AMPUTATION TRANS METATARSAL RIGHT GREAT TOE;  Surgeon: Valdez Finney MD;  Location: [facility] OR;  Service: Vascular;  Laterality: Right;      General Information     Row Name 08/08/22 1648          OT Time and Intention    Document Type progress note/recertification  -AR     Mode of Treatment individual therapy;occupational therapy  -AR     Row Name 08/08/22 1648          General Information    Existing Precautions/Restrictions fall;oxygen therapy device and L/min  -AR     Row Name 08/08/22 1648          Cognition    Orientation Status (Cognition) oriented x 3  -AR     Row Name 08/08/22 1648          Safety Issues, Functional Mobility    Safety Issues Affecting Function (Mobility) judgment;problem-solving;safety precaution awareness;safety precautions follow-through/compliance;sequencing abilities  -AR     Impairments Affecting Function (Mobility) balance;endurance/activity tolerance;pain;postural/trunk  control;range of motion (ROM);strength;shortness of breath  -AR           User Key  (r) = Recorded By, (t) = Taken By, (c) = Cosigned By    Initials Name Provider Type    Lexy Doran OT Occupational Therapist                 Mobility/ADL's     Row Name 08/08/22 1649          Bed Mobility    Bed Mobility scooting/bridging;supine-sit;rolling left;rolling right  -AR     Rolling Left Jay (Bed Mobility) dependent (less than 25% patient effort);2 person assist  -AR     Rolling Right Jay (Bed Mobility) dependent (less than 25% patient effort);2 person assist  -AR     Scooting/Bridging Jay (Bed Mobility) dependent (less than 25% patient effort);2 person assist  -AR     Comment, (Bed Mobility) Rolled R/L midline for placement of sling  -AR     Row Name 08/08/22 1649          Transfers    Transfers bed-chair transfer  -AR     Bed-Chair Jay (Transfers) dependent (less than 25% patient effort)  -AR     Assistive Device (Bed-Chair Transfers) lift device  -AR     Row Name 08/08/22 1649          Activities of Daily Living    BADL Assessment/Intervention lower body dressing  -AR     Row Name 08/08/22 1649          Lower Body Dressing Assessment/Training    [place] Level (Lower Body Dressing) don;socks;dependent (less than 25% patient effort)  -AR     Position (Lower Body Dressing) supine  -AR     Row Name 08/08/22 1649          Self-Feeding Assessment/Training    [place] Level (Feeding) liquids to mouth;maximum assist (25% patient effort)  -AR     Position (Self-Feeding) supine  -AR           User Key  (r) = Recorded By, (t) = Taken By, (c) = Cosigned By    Initials Name Provider Type    Lexy Doran OT Occupational Therapist               Obj/Interventions     Row Name 08/08/22 1651          Sensory Assessment (Somatosensory)    Sensory Assessment (Somatosensory) UE sensation intact  -AR     Row Name 08/08/22 1651          Range of Motion Comprehensive    General  Range of Motion bilateral upper extremity ROM WNL  -AR     Row Name 08/08/22 1651          Strength Comprehensive (MMT)    Comment, General Manual Muscle Testing (MMT) Assessment B shoulders 2-/5, bicep/tricep 2+/5.  2/5  -AR     Row Name 08/08/22 1651          Shoulder (Therapeutic Exercise)    Shoulder (Therapeutic Exercise) AAROM (active assistive range of motion)  -AR     Shoulder AAROM (Therapeutic Exercise) bilateral;flexion;extension;sitting;10 repetitions  -AR     Row Name 08/08/22 1651          Elbow/Forearm (Therapeutic Exercise)    Elbow/Forearm (Therapeutic Exercise) AAROM (active assistive range of motion)  -AR     Elbow/Forearm AAROM (Therapeutic Exercise) bilateral;flexion;extension;sitting;10 repetitions  -AR     Row Name 08/08/22 1651          Hand (Therapeutic Exercise)    Hand (Therapeutic Exercise) AROM (active range of motion)  -AR     Hand AROM/AAROM (Therapeutic Exercise) bilateral;finger extension;finger flexion;5 repetitions  -AR     Row Name 08/08/22 1651          Motor Skills    Therapeutic Exercise shoulder;elbow/forearm;hand  -AR     Row Name 08/08/22 1651          Balance    Balance Assessment sitting static balance;sitting dynamic balance  -AR     Static Sitting Balance moderate assist  -AR     Dynamic Sitting Balance moderate assist  -AR     Position, Sitting Balance sitting in chair  -AR           User Key  (r) = Recorded By, (t) = Taken By, (c) = Cosigned By    Initials Name Provider Type    Lexy Doran OT Occupational Therapist               Goals/Plan     Row Name 08/08/22 1657          Transfer Goal 1 (OT)    Activity/Assistive Device (Transfer Goal 1, OT) sit-to-stand/stand-to-sit;commode, bedside with drop arms;walker, rolling  -AR     [place] Level/Cues Needed (Transfer Goal 1, OT) maximum assist (25-49% patient effort);verbal cues required  -AR     Time Frame (Transfer Goal 1, OT) long term goal (LTG);by discharge  -AR     Progress/Outcome (Transfer Goal  1, OT) goal ongoing;goal revised this date  -AR     Row Name 08/08/22 1657          Dressing Goal 1 (OT)    Activity/Device (Dressing Goal 1, OT) lower body dressing;long-handled shoe horn;reacher;sock-aid  -AR     [place]/Cues Needed (Dressing Goal 1, OT) maximum assist (25-49% patient effort);verbal cues required  -AR     Time Frame (Dressing Goal 1, OT) long term goal (LTG);by discharge  -AR     Progress/Outcome (Dressing Goal 1, OT) goal ongoing  -AR     Row Name 08/08/22 1657          Toileting Goal 1 (OT)    Activity/Device (Toileting Goal 1, OT) adjust/manage clothing;perform perineal hygiene;commode, bedside without drop arms  -AR     [place] Level/Cues Needed (Toileting Goal 1, OT) maximum assist (25-49% patient effort)  -AR     Time Frame (Toileting Goal 1, OT) long term goal (LTG);by discharge  -AR     Progress/Outcome (Toileting Goal 1, OT) goal ongoing  -AR     Row Name 08/08/22 1657          Self-Feeding Goal 1 (OT)    Activity/Device (Self-Feeding Goal 1, OT) self-feeding skills, all;built-up handle utensils;adapted cup  -AR     [place] Level/Cues Needed (Self-Feeding Goal 1, OT) moderate assist (50-74% patient effort);verbal cues required  -AR     Time Frame (Self-Feeding Goal 1, OT) long term goal (LTG);by discharge  -AR     Progress/Outcomes (Self-Feeding Goal 1, OT) goal ongoing  -AR     Row Name 08/08/22 1657          Therapy Assessment/Plan (OT)    Planned Therapy Interventions (OT) adaptive equipment training;BADL retraining;activity tolerance training;functional balance retraining;IADL retraining;neuromuscular control/coordination retraining;occupation/activity based interventions;patient/caregiver education/training;ROM/therapeutic exercise;transfer/mobility retraining;strengthening exercise  -AR           User Key  (r) = Recorded By, (t) = Taken By, (c) = Cosigned By    Initials Name Provider Type    Lexy Doran, OT Occupational Therapist               Clinical  Impression     Row Name 08/08/22 1653          Pain Assessment    Pretreatment Pain Rating 0/10 - no pain  -AR     Posttreatment Pain Rating 0/10 - no pain  -AR     Pain Intervention(s) Medication (See MAR);Repositioned  -AR     Row Name 08/08/22 1653          Plan of Care Review    Plan of Care Reviewed With patient;friend  -AR     Progress declining  -AR     Outcome Evaluation Pt limited with lethargy and was oriented x3. She required dependence bed mobility, dependence LB dressing and dependence for lift to chair/ Pt limited with generalized weakness, decreased occupational endurance, dyspnea with minimal exertion with supplemental oxygen and decreased BUE AROM and strength. Recommend SNF.  -AR     Row Name 08/08/22 1653          Therapy Plan Review/Discharge Plan (OT)    Anticipated Discharge Disposition (OT) skilled nursing facility  -AR     Row Name 08/08/22 1653          Vital Signs    Pre Patient Position Supine  -AR     Intra Patient Position Sitting  -AR     Post Patient Position Sitting  -AR     Row Name 08/08/22 1653          Positioning and Restraints    Pre-Treatment Position in bed  -AR     Post Treatment Position chair  -AR     In Chair sitting;call light within reach;encouraged to call for assist;exit alarm on;waffle cushion;on mechanical lift sling;legs elevated;with family/caregiver  -AR           User Key  (r) = Recorded By, (t) = Taken By, (c) = Cosigned By    Initials Name Provider Type    Lexy Doran, OT Occupational Therapist               Outcome Measures     Row Name 08/08/22 1701          How much help from another is currently needed...    Putting on and taking off regular lower body clothing? 1  -AR     Bathing (including washing, rinsing, and drying) 1  -AR     Toileting (which includes using toilet bed pan or urinal) 1  -AR     Putting on and taking off regular upper body clothing 2  -AR     Taking care of personal grooming (such as brushing teeth) 2  -AR     Eating meals 2   -AR     AM-PAC 6 Clicks Score (OT) 9  -AR     Row Name 08/08/22 0750          How much help from another person do you currently need...    Turning from your back to your side while in flat bed without using bedrails? 2  -MK     Moving from lying on back to sitting on the side of a flat bed without bedrails? 2  -MK     Moving to and from a bed to a chair (including a wheelchair)? 2  -MK     Standing up from a chair using your arms (e.g., wheelchair, bedside chair)? 3  -MK     Climbing 3-5 steps with a railing? 1  -MK     To walk in hospital room? 2  -MK     AM-PAC 6 Clicks Score (PT) 12  -MK     Highest level of mobility 4 --> Transferred to chair/commode  -MK     Row Name 08/08/22 1701          Functional Assessment    Outcome Measure Options AM-PAC 6 Clicks Daily Activity (OT)  -AR           User Key  (r) = Recorded By, (t) = Taken By, (c) = Cosigned By    Initials Name Provider Type    Lexy Doran, OT Occupational Therapist    Farheen Tavera, RN Registered Nurse                Occupational Therapy Education                 Title: PT OT SLP Therapies (Done)     Topic: Occupational Therapy (Done)     Point: ADL training (Done)     Description:   Instruct learner(s) on proper safety adaptation and remediation techniques during self care or transfers.   Instruct in proper use of assistive devices.              Learning Progress Summary           Patient Eager, E,TB,D, VU,NR by AR at 8/8/2022 1701    Acceptance, E, VU by MR at 8/1/2022 1742    Acceptance, E, VU,NR by MR at 7/29/2022 1455    Acceptance, E, NR by  at 7/27/2022 1352   Other Eager, E,TB,D, VU,NR by AR at 8/8/2022 1701                   Point: Home exercise program (Done)     Description:   Instruct learner(s) on appropriate technique for monitoring, assisting and/or progressing therapeutic exercises/activities.              Learning Progress Summary           Patient Eager, E,TB,D, VU,NR by AR at 8/8/2022 1701    Acceptance, E, VU by  MR at 8/1/2022 1742   Other Eager, E,TB,D, VU,NR by AR at 8/8/2022 1701                   Point: Precautions (Done)     Description:   Instruct learner(s) on prescribed precautions during self-care and functional transfers.              Learning Progress Summary           Patient Eager, E,TB,D, VU,NR by AR at 8/8/2022 1701    Acceptance, E, VU by MR at 8/1/2022 1742    Acceptance, E, VU,NR by MR at 7/29/2022 1455    Acceptance, E, NR by  at 7/27/2022 1352   Other Eager, E,TB,D, VU,NR by AR at 8/8/2022 1701                   Point: Body mechanics (Done)     Description:   Instruct learner(s) on proper positioning and spine alignment during self-care, functional mobility activities and/or exercises.              Learning Progress Summary           Patient Eager, E,TB,D, VU,NR by AR at 8/8/2022 1701    Acceptance, E, VU by MR at 8/1/2022 1742    Acceptance, E, VU,NR by MR at 7/29/2022 1455    Acceptance, E, NR by  at 7/27/2022 1352   Other Eager, E,TB,D, VU,NR by AR at 8/8/2022 1701                               User Key     Initials Effective Dates Name Provider Type Discipline    AR 06/16/21 -  eLxy Bryan, OT Occupational Therapist OT     06/16/21 -  Nishi Diego, OT Occupational Therapist OT     10/06/21 -  Treasure Mobley, OT Occupational Therapist OT              OT Recommendation and Plan  Planned Therapy Interventions (OT): adaptive equipment training, BADL retraining, activity tolerance training, functional balance retraining, IADL retraining, neuromuscular control/coordination retraining, occupation/activity based interventions, patient/caregiver education/training, ROM/therapeutic exercise, transfer/mobility retraining, strengthening exercise  Plan of Care Review  Plan of Care Reviewed With: patient, friend  Progress: declining  Outcome Evaluation: Pt limited with lethargy and was oriented x3. She required dependence bed mobility, dependence LB dressing and dependence for lift to chair/ Pt  limited with generalized weakness, decreased occupational endurance, dyspnea with minimal exertion with supplemental oxygen and decreased BUE AROM and strength. Recommend SNF.     Time Calculation:    Time Calculation- OT     Row Name 08/08/22 1702             Time Calculation- OT    OT Start Time 1425  -AR      OT Received On 08/08/22  -AR      OT Goal Re-Cert Due Date 08/18/22  -AR              Timed Charges    25668 - OT Therapeutic Exercise Minutes 10  -AR      63936 - OT Therapeutic Activity Minutes 44  -AR              Total Minutes    Timed Charges Total Minutes 54  -AR       Total Minutes 54  -AR            User Key  (r) = Recorded By, (t) = Taken By, (c) = Cosigned By    Initials Name Provider Type    AR Lexy Bryan, OT Occupational Therapist              Therapy Charges for Today     Code Description Service Date Service Provider Modifiers Qty    91558846736 HC OT THERAPEUTIC ACT EA 15 MIN 8/8/2022 Lexy Bryan, OT GO 3    65798668351 HC OT THER PROC EA 15 MIN 8/8/2022 Lexy Bryan OT GO 1    10112352721 HC OT THER SUPP EA 15 MIN 8/8/2022 Lexy Bryan OT GO 4               Lexy Bryan OT  8/8/2022

## 2022-08-08 NOTE — PROGRESS NOTES
Enter Query Response Below      Query Response:     Dilutional hyponatremia ( Other )         If applicable, please update the problem list.     Patient: Susan Anderson        : 1940  Account: [id number]           Admit Date:         Options to Respond to Query:    1. Access the Encounter     a. From the To-Do Side bar, click Respond With Note.     b. Click New Note     c. Answer query within the yellow box.                d. Update the Problem List if applicable.     Dr Neff,    Thank you for your response to the CDI query. In order to be compliant with regulatory entities, CDI query responses are not valid without the physician’s electronic signature. If your query response is still accurate and clinically significant, please restate and add your electronic signature.   Hint: use .bhesig then HIT ENTER     From: Cecil Neff MD  Sent: 2022   7:14 PM EDT  To: Supriya Munson  Subject: RE: CDI Query                                         Other: dilutional hyponatremia      ----- Message -----  From: Supriya Munson  Sent: 2022   1:05 PM EDT  To: Cecil Neff MD  Subject: CDI Query                                             Patient: Susan Anderson        : 1940  Account: [id number]           Admit Date:                                                     Options to Respond to Query:     1. Access the Encounter                a. From the To-Do Side bar, click Respond With Note.                b. Click New Note                c. Answer query within the yellow box.                d. Update the Problem List if applicable.     Dr. Neff,     Patient is diagnosed on  with mild hyponatremia.  Daily labs this stay:        Sodium  Glucose     137     132     300     135     113     133     213     134     249     134     87   and later Sodium 140s  Normal Saline IV fluids were administered on .     Please clarify the following regarding hyponatremia:      *Hyponatremia is clinically significant (please provide additional clinical supporting indicators and treatment) ________  *Hyponatremia is not clinically significant  *Other (please specify)______  *Unable to determine     By submitting this query, we are merely seeking further clarification of documentation to accurately reflect all conditions that you are monitoring, evaluating, treating or that extend the hospitalization or utilize additional resources of care. Please utilize your independent clinical judgment when addressing the question(s) above.      This query and your response, once completed, will be entered into the legal medical record.     Sincerely,  Supriya SHAH, RN  Clinical Documentation Integrity Program   [email or web address]

## 2022-08-09 LAB
GLUCOSE BLDC GLUCOMTR-MCNC: 211 MG/DL (ref 70–130)
GLUCOSE BLDC GLUCOMTR-MCNC: 224 MG/DL (ref 70–130)
GLUCOSE BLDC GLUCOMTR-MCNC: 226 MG/DL (ref 70–130)
GLUCOSE BLDC GLUCOMTR-MCNC: 390 MG/DL (ref 70–130)

## 2022-08-09 PROCEDURE — 97116 GAIT TRAINING THERAPY: CPT

## 2022-08-09 PROCEDURE — 63710000001 INSULIN LISPRO (HUMAN) PER 5 UNITS: Performed by: NURSE PRACTITIONER

## 2022-08-09 PROCEDURE — 97530 THERAPEUTIC ACTIVITIES: CPT

## 2022-08-09 PROCEDURE — 25010000002 HEPARIN (PORCINE) PER 1000 UNITS: Performed by: INTERNAL MEDICINE

## 2022-08-09 PROCEDURE — 63710000001 INSULIN LISPRO (HUMAN) PER 5 UNITS: Performed by: PHYSICIAN ASSISTANT

## 2022-08-09 PROCEDURE — 82962 GLUCOSE BLOOD TEST: CPT

## 2022-08-09 PROCEDURE — 63710000001 INSULIN DETEMIR PER 5 UNITS: Performed by: PHYSICIAN ASSISTANT

## 2022-08-09 PROCEDURE — 99232 SBSQ HOSP IP/OBS MODERATE 35: CPT | Performed by: PHYSICIAN ASSISTANT

## 2022-08-09 RX ORDER — FERROUS SULFATE 325(65) MG
325 TABLET ORAL
Status: DISCONTINUED | OUTPATIENT
Start: 2022-08-09 | End: 2022-08-12 | Stop reason: HOSPADM

## 2022-08-09 RX ORDER — BUMETANIDE 1 MG/1
1 TABLET ORAL
Status: DISCONTINUED | OUTPATIENT
Start: 2022-08-09 | End: 2022-08-10

## 2022-08-09 RX ORDER — DIAZEPAM 2 MG/1
4 TABLET ORAL NIGHTLY PRN
Status: DISCONTINUED | OUTPATIENT
Start: 2022-08-09 | End: 2022-08-12 | Stop reason: HOSPADM

## 2022-08-09 RX ADMIN — CASTOR OIL AND BALSAM, PERU 1 APPLICATION: 788; 87 OINTMENT TOPICAL at 21:29

## 2022-08-09 RX ADMIN — HEPARIN SODIUM 5000 UNITS: 5000 INJECTION INTRAVENOUS; SUBCUTANEOUS at 09:35

## 2022-08-09 RX ADMIN — INSULIN LISPRO 8 UNITS: 100 INJECTION, SOLUTION INTRAVENOUS; SUBCUTANEOUS at 13:10

## 2022-08-09 RX ADMIN — ASPIRIN 81 MG: 81 TABLET, COATED ORAL at 09:37

## 2022-08-09 RX ADMIN — ATORVASTATIN CALCIUM 80 MG: 40 TABLET, FILM COATED ORAL at 21:25

## 2022-08-09 RX ADMIN — PREGABALIN 50 MG: 50 CAPSULE ORAL at 21:25

## 2022-08-09 RX ADMIN — SENNOSIDES AND DOCUSATE SODIUM 1 TABLET: 50; 8.6 TABLET ORAL at 09:37

## 2022-08-09 RX ADMIN — BUMETANIDE 1 MG: 1 TABLET ORAL at 09:38

## 2022-08-09 RX ADMIN — DIAZEPAM 4 MG: 2 TABLET ORAL at 01:19

## 2022-08-09 RX ADMIN — GUAIFENESIN 1200 MG: 600 TABLET, EXTENDED RELEASE ORAL at 09:37

## 2022-08-09 RX ADMIN — AMLODIPINE BESYLATE 5 MG: 5 TABLET ORAL at 09:37

## 2022-08-09 RX ADMIN — POLYETHYLENE GLYCOL 3350 17 G: 17 POWDER, FOR SOLUTION ORAL at 09:35

## 2022-08-09 RX ADMIN — CYCLOBENZAPRINE 5 MG: 10 TABLET, FILM COATED ORAL at 01:18

## 2022-08-09 RX ADMIN — LIDOCAINE 1 PATCH: 50 PATCH CUTANEOUS at 09:38

## 2022-08-09 RX ADMIN — HEPARIN SODIUM 5000 UNITS: 5000 INJECTION INTRAVENOUS; SUBCUTANEOUS at 21:24

## 2022-08-09 RX ADMIN — HYDRALAZINE HYDROCHLORIDE 25 MG: 25 TABLET, FILM COATED ORAL at 05:48

## 2022-08-09 RX ADMIN — HYDRALAZINE HYDROCHLORIDE 25 MG: 25 TABLET, FILM COATED ORAL at 21:25

## 2022-08-09 RX ADMIN — ISOSORBIDE DINITRATE 20 MG: 20 TABLET ORAL at 21:25

## 2022-08-09 RX ADMIN — INSULIN LISPRO 3 UNITS: 100 INJECTION, SOLUTION INTRAVENOUS; SUBCUTANEOUS at 17:36

## 2022-08-09 RX ADMIN — INSULIN LISPRO 3 UNITS: 100 INJECTION, SOLUTION INTRAVENOUS; SUBCUTANEOUS at 09:34

## 2022-08-09 RX ADMIN — DICLOFENAC 2 G: 10 GEL TOPICAL at 17:42

## 2022-08-09 RX ADMIN — GUAIFENESIN 1200 MG: 600 TABLET, EXTENDED RELEASE ORAL at 21:25

## 2022-08-09 RX ADMIN — ACETAMINOPHEN 650 MG: 325 TABLET, FILM COATED ORAL at 07:27

## 2022-08-09 RX ADMIN — INSULIN LISPRO 8 UNITS: 100 INJECTION, SOLUTION INTRAVENOUS; SUBCUTANEOUS at 17:37

## 2022-08-09 RX ADMIN — INSULIN LISPRO 6 UNITS: 100 INJECTION, SOLUTION INTRAVENOUS; SUBCUTANEOUS at 13:10

## 2022-08-09 RX ADMIN — INSULIN DETEMIR 25 UNITS: 100 INJECTION, SOLUTION SUBCUTANEOUS at 09:33

## 2022-08-09 RX ADMIN — CASTOR OIL AND BALSAM, PERU 1 APPLICATION: 788; 87 OINTMENT TOPICAL at 09:37

## 2022-08-09 RX ADMIN — POLYETHYLENE GLYCOL 3350 17 G: 17 POWDER, FOR SOLUTION ORAL at 17:37

## 2022-08-09 RX ADMIN — VITAM B12 100 MCG: 100 TAB at 09:37

## 2022-08-09 RX ADMIN — PREGABALIN 50 MG: 50 CAPSULE ORAL at 09:37

## 2022-08-09 RX ADMIN — ISOSORBIDE DINITRATE 20 MG: 20 TABLET ORAL at 09:37

## 2022-08-09 RX ADMIN — INSULIN LISPRO 8 UNITS: 100 INJECTION, SOLUTION INTRAVENOUS; SUBCUTANEOUS at 09:34

## 2022-08-09 RX ADMIN — ALLOPURINOL 300 MG: 300 TABLET ORAL at 09:37

## 2022-08-09 RX ADMIN — DICLOFENAC 2 G: 10 GEL TOPICAL at 09:40

## 2022-08-09 RX ADMIN — BUMETANIDE 1 MG: 1 TABLET ORAL at 17:37

## 2022-08-09 RX ADMIN — Medication 10 ML: at 09:41

## 2022-08-09 RX ADMIN — AMOXICILLIN AND CLAVULANATE POTASSIUM 1 TABLET: 875; 125 TABLET, FILM COATED ORAL at 09:37

## 2022-08-09 RX ADMIN — FERROUS SULFATE TAB 325 MG (65 MG ELEMENTAL FE) 325 MG: 325 (65 FE) TAB at 17:37

## 2022-08-09 RX ADMIN — DICLOFENAC 2 G: 10 GEL TOPICAL at 13:30

## 2022-08-09 RX ADMIN — HYDRALAZINE HYDROCHLORIDE 25 MG: 25 TABLET, FILM COATED ORAL at 13:29

## 2022-08-09 NOTE — PLAN OF CARE
Goal Outcome Evaluation:  Plan of Care Reviewed With: patient        Progress: no change     Pt is alert with some confusion.  VSS, 2-3L NC. NSR on telemetry. Q2h turned.

## 2022-08-09 NOTE — PROGRESS NOTES
INFECTIOUS DISEASE  Progress Note    Susan Anderson  1940  [id number]    Admission Date: 7/26/2022      Requesting Provider: Brigid Seo MD  Evaluating Physician: Stef Pete MD    Reason for Consultation: Bacteremia    History of present illness:    7/29/22: Patient is a 82 y.o. female with h/o T2DM, CHF, CKD Stage III, HTN, chronic BLE edema, chronic right lymphedema, ELIUD/nocturnal 2L O2 NC, acoustic neuroma, CAD, T2DM, gout, HLD, asthma, PVD/right great toe TMA 4/2021/now on oral Augmentin therapy for 6 to 12 weeks from 5/19/22, cervical cancer/hysterectomy, and chronic mixed diastolic/systolic CHF, and NICM with EF 40-45% who we were asked to see for MRSA bacteremia.  The patient presented to [facility] ED on 7/26 after falling at home and pinned between the toilet and her wheelchair.  She lives alone and is unsure what happened, but states that she did not lose consciousness.  She had episode of severe diarrhea before and after falling.  Her daughter found her on the floor and called EMS.  She developed a spasm in her left lower back radiating to her hip and LLE making it difficult to get her up off the floor.  He also scraped her right knee.  Her bumex was recently doubled for 3 days for BLE edema.  She is not very mobile or independent requiring wheelchair for mobilization and requires assistance for showering.  She denies fever, chills, shortness of breath, nausea, vomiting, diarrhea, or dysuria.  She has had some nasal congestion.  On arrival to ED, her Tlow was 93.9 and her HR was bradycardic.  Admitting labs were A1C 9.0, WBC 6300 with 81% segs/2% bands, , creatinine 1.8 (baseline around 1.2-1.4), PCT 0.09, lactic acid 1.1, and UA WBC 0-2.  Blood cultures are positive in 2 of 2 sets (4 of 4 bottles) with GPC and BCID positive for MRSA and CoNS.  A COVID-19/Flu PCR was negative.  Imaging showed no evidence of acute fractures.  She had some swelling of right side of periorbital/cheek soft  tissue, chronic right maxillary sinusitis, mild pulmonary edema, subcutaneous edema of right chest and lateral abdominal wall with small volume ascites similar to 7/13/22, and prepatellar soft tissue swelling c/w hematoma or bursitis.  A CT scan of head showed no bleeds. She is currently on Vancomycin.  ID was asked to evaluate and manage her antibiotic therapy.    Her daughter indicates that she was lying on the floor at home for least 10 hours, unable to arise.  Her usual caretaker did not come to the house that day.    7/30/22: She has remained afebrile. Repeat blood cultures from 7/29 are pending.  Right prepatellar aspirate Gram stain revealed rare white blood cells with no organisms seen. Blood cultures from 726 have grown Staph epidermidis in both sets and MRSA in 1 set. Echocardiogram yesterday revealed mild mitral regurgitation.  She continues to complain of severe right knee pain.  Orthopedics has ordered an MRI scan.    8/1/22: She has remained afebrile. Follow-up blood cultures from 7/29 at been negative.  Right prepatellar aspirate culture is negative so far.  MRI scan of the right knee Revealed a moderate knee effusion which was nonspecific.  She had diffuse circumferential soft tissue swelling with isointense prepatellar collection consistent with hematoma versus infectious bursitis versus posttraumatic.  She was also noted to have complex degenerative tearing of the menisci.  She had an indistinct anterior cruciate ligament. White blood cell count today is 5.8.  Creatinine is 1.43.  She feels better today.  She has some improvement in her right knee pain.     8/2/22: She has remained afebrile.  She still complains of right knee pain.  She was able to get out of bed with assistance yesterday. Her creatinine today is 1.34.  Her white blood cell count is 4.7. Overall, she feels somewhat better.    8/3/22: She has remained afebrile.  She is on oral Augmentin. She denies nausea or vomiting.  She has some  decreased right knee pain.    8/4/22: She remains afebrile. She feels somewhat better today.  She has decreased right knee pain.  She complains of fatigue.    8/5/22: She has remained afebrile.  She has decreased right knee pain.  She is awaiting transfer to rehab.  She complains of an exacerbation of her chronic lumbar back pain.  She is more drowsy today, apparently after pain medications.    8/6/22: She has been severely encephalopathic with psychosis today.  This is thought to be medication induced and her medications are being adjusted.  She has remained afebrile.  She cannot provide a reliable ROS.    8/8/22: Her psychosis is improved.  She remains severely fatigued.  She has been afebrile.  She denies increased right knee pain.        Past Medical History:   Diagnosis Date   • Arthritis    • Asthma 6/4 - double pneumonia    Currently on inhaler and nebulizer   • Cancer (HCC)     cervical cancer, skin cancer   • CHF (congestive heart failure) ([facility]) June 4, 2021   • Chronic kidney disease Related to diabetes   • Coronary artery disease 6/4/2021 DX for hear failure   • Diabetes mellitus (HCC) 30 years    Seeing Dr. Lam 1st time Aug 19   • Gout    • Hx of colonoscopy    • Hyperlipidemia Reference current labs x 2-3yrs approx   • Hypertension 30 years   • Migraine    • Mitral valve disease    • Mitral valve disease    • Osteomyelitis (HCC)    • Peripheral neuropathy    • Sleep apnea    • Type 2 diabetes mellitus (HCC)     30 years       Past Surgical History:   Procedure Laterality Date   • ABDOMINAL HYSTERECTOMY W/SALPINGECTOMY     • AMPUTATION  Right great toe, 1st 1/3 metatarsal -Reg 4/14/21   • AORTAGRAM N/A 4/9/2021    Procedure: AORTAGRAM WITH OR WITHOUT RUNOFFS, WITH Co2;  Surgeon: Trey Forrest MD;  Location: [facility];  Service: Vascular;  Laterality: N/A;   • APPENDECTOMY     • BRAIN TUMOR EXCISION      laser surgery    • CARDIAC CATHETERIZATION N/A 6/21/2021    Procedure: LEFT HEART CATH;   Surgeon: Anjum Ceballos MD;  Location: [facility] CATH INVASIVE LOCATION;  Service: Cardiology;  Laterality: N/A;   • CATARACT EXTRACTION W/ INTRAOCULAR LENS  IMPLANT, BILATERAL     • CHOLECYSTECTOMY     • EYE SURGERY     • HYSTERECTOMY     • TOE SURGERY     • TRANS METATARSAL AMPUTATION Right 4/14/2021    Procedure: AMPUTATION TRANS METATARSAL RIGHT GREAT TOE;  Surgeon: Valdez Finney MD;  Location:  [facility];  Service: Vascular;  Laterality: Right;       Family History   Problem Relation Age of Onset   • Diabetes Mother         Type II   • Heart disease Father    • Heart attack Father    • Coronary artery disease Father    • Diabetes Father         Type II   • Diabetes Sister    • Diabetes Maternal Grandmother    • Diabetes Maternal Grandfather    • Diabetes Paternal Grandmother    • Diabetes Paternal Grandfather        Social History     Socioeconomic History   • Marital status:    • Number of children: 1   Tobacco Use   • Smoking status: Never Smoker   • Smokeless tobacco: Never Used   Vaping Use   • Vaping Use: Never used   Substance and Sexual Activity   • Alcohol use: Yes     Comment: Social drinking when not recovering from hospitalization   • Drug use: Never   • Sexual activity: Not Currently     Birth control/protection: None       Allergies   Allergen Reactions   • Cephalexin Nausea Only   • Erythromycin Base Nausea Only   • Melatonin Other (See Comments)     Nightmares   • Oxycodone Nausea Only   • Sulfa Antibiotics Nausea Only         Medication:    Current Facility-Administered Medications:   •  acetaminophen (TYLENOL) tablet 650 mg, 650 mg, Oral, Q4H PRN, 650 mg at 08/07/22 1329 **OR** [DISCONTINUED] acetaminophen (TYLENOL) 160 MG/5ML solution 650 mg, 650 mg, Oral, Q4H PRN **OR** [DISCONTINUED] acetaminophen (TYLENOL) suppository 650 mg, 650 mg, Rectal, Q4H PRN, BreanaMiniChey G, DO  •  allopurinol (ZYLOPRIM) tablet 300 mg, 300 mg, Oral, Daily, BreanaMiniChey G, DO, 300 mg at 08/08/22 0822  •   amLODIPine (NORVASC) tablet 5 mg, 5 mg, Oral, Daily, BreanaChey hawthorne G, DO, 5 mg at 08/08/22 0822  •  amoxicillin-clavulanate (AUGMENTIN) 875-125 MG per tablet 1 tablet, 1 tablet, Oral, Daily, Stef Pete MD, 1 tablet at 08/08/22 0821  •  aspirin EC tablet 81 mg, 81 mg, Oral, Daily, Chey Toussaint G, DO, 81 mg at 08/08/22 0822  •  atorvastatin (LIPITOR) tablet 80 mg, 80 mg, Oral, Nightly, Anna Crystal PA-C, 80 mg at 08/08/22 2042  •  bisacodyl (DULCOLAX) EC tablet 10 mg, 10 mg, Oral, Daily PRN, Anna Crystal PA-C, 10 mg at 08/07/22 0814  •  bisacodyl (DULCOLAX) suppository 10 mg, 10 mg, Rectal, Daily PRN, Anna Crystal PA-C  •  castor oil-balsam peru (VENELEX) ointment 1 application, 1 application, Topical, Q12H, Brigid Seo MD, 1 application at 08/08/22 2044  •  cyclobenzaprine (FLEXERIL) tablet 5 mg, 5 mg, Oral, TID PRN, Sandra Bernstein APRN, 5 mg at 08/08/22 1423  •  dextrose (D50W) (25 g/50 mL) IV injection 25 g, 25 g, Intravenous, Q15 Min PRN, Chey Toussaint G, DO  •  dextrose (GLUTOSE) oral gel 15 g, 15 g, Oral, Q15 Min PRN, BreanaMini hawthornesie G, DO  •  diazePAM (VALIUM) tablet 4 mg, 4 mg, Oral, Q12H PRN, Sandra Bernstein APRN, 4 mg at 08/07/22 1420  •  Diclofenac Sodium (VOLTAREN) 1 % gel 2 g, 2 g, Topical, 4x Daily, Brigid Seo MD, 2 g at 08/08/22 2044  •  glucagon (human recombinant) (GLUCAGEN DIAGNOSTIC) injection 1 mg, 1 mg, Intramuscular, Q15 Min PRN, Chey Toussaint, DO  •  guaiFENesin (MUCINEX) 12 hr tablet 1,200 mg, 1,200 mg, Oral, Q12H, Noemy Smith APRN, 1,200 mg at 08/08/22 2042  •  heparin (porcine) 5000 UNIT/ML injection 5,000 Units, 5,000 Units, Subcutaneous, Q12H, Chey Toussaint, DO, 5,000 Units at 08/08/22 2042  •  hydrALAZINE (APRESOLINE) tablet 25 mg, 25 mg, Oral, Q8H, Ruddy Guerrier, DO, 25 mg at 08/08/22 2042  •  insulin detemir (LEVEMIR) injection 22 Units, 22 Units, Subcutaneous, Daily, Anna Crystal PA-C, 22 Units at 08/08/22 0823  •   Insulin Lispro (humaLOG) injection 0-7 Units, 0-7 Units, Subcutaneous, TID AC, Anna Crystal PA-C, 3 Units at 08/08/22 1700  •  [START ON 8/9/2022] Insulin Lispro (humaLOG) injection 8 Units, 8 Units, Subcutaneous, TID With Meals, Noemy Smith, APRN  •  ipratropium-albuterol (DUO-NEB) nebulizer solution 3 mL, 3 mL, Nebulization, Q6H PRN, Noemy Smith, APRN  •  isosorbide dinitrate (ISORDIL) tablet 20 mg, 20 mg, Oral, BID, Breana, Chey G, DO, 20 mg at 08/08/22 2042  •  lidocaine (LIDODERM) 5 % 1 patch, 1 patch, Transdermal, Q24H, Breana, Chey G, DO, 1 patch at 08/08/22 0822  •  magnesium hydroxide (MILK OF MAGNESIA) suspension 10 mL, 10 mL, Oral, Daily PRN, Anna Crystal PA-C, 10 mL at 08/02/22 0820  •  ondansetron (ZOFRAN) tablet 4 mg, 4 mg, Oral, Q6H PRN **OR** ondansetron (ZOFRAN) injection 4 mg, 4 mg, Intravenous, Q6H PRN, Breana, Chey G, DO, 4 mg at 08/02/22 0106  •  polyethylene glycol (MIRALAX) packet 17 g, 17 g, Oral, BID, Anna Crystal PA-C, 17 g at 08/08/22 1700  •  Polyvinyl Alcohol-Povidone PF (HYPOTEARS) 1.4-0.6 % ophthalmic solution 1 drop, 1 drop, Both Eyes, Q1H PRN, Noemy Smith, APRN  •  pregabalin (LYRICA) capsule 50 mg, 50 mg, Oral, Q12H, Breana, Chey G, DO, 50 mg at 08/08/22 2042  •  sennosides-docusate (PERICOLACE) 8.6-50 MG per tablet 1 tablet, 1 tablet, Oral, BID, Chey, Ruddy, DO, 1 tablet at 08/08/22 2042  •  [COMPLETED] Insert peripheral IV, , , Once **AND** sodium chloride 0.9 % flush 10 mL, 10 mL, Intravenous, PRN, BreanaMini hawthornesie G, DO  •  sodium chloride 0.9 % flush 10 mL, 10 mL, Intravenous, Q12H, BreanaMiniChey G, DO, 10 mL at 08/08/22 0828  •  sodium chloride 0.9 % flush 10 mL, 10 mL, Intravenous, PRN, BreanaMini hawthornesie G, DO  •  sodium chloride nasal spray 1 spray, 1 spray, Each Nare, PRN, Noemy Smith, APRN  •  vitamin B-12 (CYANOCOBALAMIN) tablet 100 mcg, 100 mcg, Oral, Daily, Chey Toussaint, DO, 100 mcg at 08/08/22  0822    Antibiotics:  Anti-Infectives (From admission, onward)    Ordered     Dose/Rate Route Frequency Start Stop    22 1229  amoxicillin-clavulanate (AUGMENTIN) 875-125 MG per tablet 1 tablet        Ordering Provider: Stef Pete MD    1 tablet Oral Daily 22 1315 22 0859    22 0859  vancomycin (VANCOCIN) 1000 mg/200 mL dextrose 5% IVPB        Ordering Provider: Tom Phelan PharmD    10 mg/kg × 104 kg  over 60 Minutes Intravenous Once 22 1000 22 1028    22 1824  vancomycin 1750 mg/500 mL 0.9% NS IVPB ([facility])        Ordering Provider: Adalid Mcrae RPH    20 mg/kg × 90.7 kg  over 105 Minutes Intravenous Once 22 1915 22 2204            Review of Systems:  See HPI    Physical Exam:   Vital Signs  Temp (24hrs), Av.7 °F (36.5 °C), Min:97.3 °F (36.3 °C), Max:98.7 °F (37.1 °C)    Temp  Min: 97.3 °F (36.3 °C)  Max: 98.7 °F (37.1 °C)  BP  Min: 132/66  Max: 171/77  Pulse  Min: 67  Max: 82  Resp  Min: 18  Max: 18  SpO2  Min: 93 %  Max: 97 %    GENERAL: She is fatigued and debilitated appearing.  Her confusion/psychosis is better  HEENT: Normocephalic, atraumatic.  PERRL. EOMI. No conjunctival injection. No icterus. No labial ulcers  NECK: Supple   HEART: RRR; No murmur  LUNGS: Clear to auscultation bilaterally without wheezing, rales, rhonchi. Normal respiratory effort.  ABDOMEN: Soft, nontender, nondistended.   EXT:  No cyanosis, clubbing or edema. No cord.  :  Without Bui catheter.  MSK: Her right pretibial wound is 3-4 cm in diameter with some residual slough but no cellulitis.  There is decreasedprepatellar swelling secondary to hematoma.  SKIN: Warm and dry without cutaneous eruptions on Inspection/palpation.    NEURO: Alert and responsive.  She is less confused.  She moves all 4 extremities.    Laboratory Data    Results from last 7 days   Lab Units 22  0933 22  0752 22  0701   WBC 10*3/mm3 5.89 6.16 4.72   HEMOGLOBIN g/dL 9.2*  9.3* 9.7*   HEMATOCRIT % 29.0* 28.8* 30.7*   PLATELETS 10*3/mm3 220 200 147     Results from last 7 days   Lab Units 08/07/22  0933   SODIUM mmol/L 141   POTASSIUM mmol/L 4.1   CHLORIDE mmol/L 100   CO2 mmol/L 32.0*   BUN mg/dL 60*   CREATININE mg/dL 1.15*   GLUCOSE mg/dL 171*   CALCIUM mg/dL 9.0                             Estimated Creatinine Clearance: 41.4 mL/min (A) (by C-G formula based on SCr of 1.15 mg/dL (H)).      Microbiology:  Microbiology Results (last 10 days)     Procedure Component Value - Date/Time    Eosinophil Smear - Urine, Urine, Clean Catch [[id number]]  (Normal) Collected: 07/31/22 1503    Lab Status: Final result Specimen: Urine, Clean Catch Updated: 07/31/22 1627     Eosinophil Smear 0 % EOS/100 Cells     Narrative:      No eosinophil seen                Radiology:  MRI Lumbar Spine Without Contrast    Result Date: 8/6/2022  1.  Multilevel advanced degenerative changes similar to the more recent imaging.  This report was finalized on 8/6/2022 12:33 PM by Fam Hunt MD.      XR Chest 1 View    Result Date: 8/6/2022  1.  There are findings that could reflect changes from heart failure which have been suggested.  This report was finalized on 8/6/2022 11:16 AM by Fam Hunt MD.      XR Chest 1 View    Result Date: 8/3/2022  Congestive heart failure with moderate bibasilar effusion/atelectasis.  This report was finalized on 8/3/2022 12:17 PM by Dr. Breezy Rey MD.      Duplex Renal Artery - Bilateral Complete CAR    Result Date: 8/1/2022   1. Limited study due to the patient's body habitus and lack of cooperation. 2. Elevated resistive indices which may reflect underlying intrinsic renal disease. 3. No evidence of obstructive uropathy or significant renal artery stenosis.  This report was finalized on 8/1/2022 4:20 PM by Trey Saba MD.        Impression:   1. 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  The fact that she has 2  positive blood cultures for staph epidermis with MRSA also one of the sets suggest that the isolates all skin contaminants.  In addition, follow-up blood cultures have been negative.  She does not have any focal evidence of MRSA infection.  She is now stable off of intravenous antibiotics  2. Right prepatellar hematoma/right knee pain-her routine x-ray was negative for fracture.  MRI scan does not reveal a fracture or tendon rupture.  3. Recent onset of left pretibial ulcer  4. 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 On chronic Augmentin oral suppressive therapy for about a year now.  H/o MSSA.  5. Hypothermia, improved  6. Acute on chronic kidney disease Stage III, ATN vs rhabdomyolysis  7. Rhadomyolysis, after fall and lying on right side.  8. Right-sided edema/anasarca  9. Elevated LFTs/bilirubin related to above.   10. Chronic BLE edema/RLE lymphedema  11. Type 2 diabetes mellitus, poorly controlled  12. Essential hypertension  13. Nonischemic cardiomyopathy with EF 4-45%  14. Chronic diastolic/systolic mixed CHF  15. Acoustic neuroma  16. Cephelexin, erythromycin, and sulfa intolerances (GI intolerance).    17. Metabolic encephalopathy-Secondary to medication effect and now improved.    PLAN/RECOMMENDATIONS:   1.  Continue off of intravenous antibiotic therapy  2.  Augmentin 875 mg p.o. daily for chronic suppression of right foot infection  3.  Mobilize  4.  Discharge to rehab    I discussed her situation with her daughter again today.      Stef Pete MD  8/8/2022  21:22 EDT

## 2022-08-09 NOTE — PLAN OF CARE
Goal Outcome Evaluation:  Plan of Care Reviewed With: patient        Progress: improving  Outcome Evaluation: Pt recert: Pt continues with decreased activity tolerance, balance deficits, and generalized weakness limiting her functional mobility. Pt performed bed mobility Mod Ax1, STS min A, and ambulated 8' w/ a RW- min A for RW management and weight shifting/balance. Continue PT POC- goals updated this date. Recommend dc SNF

## 2022-08-09 NOTE — THERAPY PROGRESS REPORT/RE-CERT
Patient Name: Susan Anderson  : 1940    MRN: [id number]                              Today's Date: 2022       Admit Date: 2022    Visit Dx:     ICD-10-CM ICD-9-CM   1. Weakness  R53.1 780.79   2. Fall, initial encounter  W19.XXXA E888.9   3. Elevated CK  R74.8 790.5   4. Elevated troponin  R77.8 790.6   5. Chronic renal failure, unspecified CKD stage  N18.9 585.9   6. Hyperglycemia  R73.9 790.29     Patient Active Problem List   Diagnosis   • Diabetic foot ulcer (Formerly [facility])   • PVD (peripheral vascular disease) (Formerly [facility])   • Osteomyelitis (Formerly [facility])   • Diabetes mellitus with neuropathy (Formerly [facility])   • Essential hypertension   • CKD (chronic kidney disease), stage III (Formerly [facility])   • Pyogenic inflammation of bone (Formerly [facility])   • Hyperglycemia   • Pneumonia due to infectious organism   • Mitral valve disease   • NICM (nonischemic cardiomyopathy) (Formerly [facility])   • Coronary artery disease involving native coronary artery of native heart without angina pectoris   • Dyslipidemia   • Chronic combined systolic and diastolic heart failure (Formerly [facility])   • Lumbar stenosis with neurogenic claudication   • Spondylosis of lumbar region without myelopathy or radiculopathy   • Spondylolisthesis, lumbar region   • Degeneration of lumbar or lumbosacral intervertebral disc   • Gait disturbance   • Physical deconditioning   • Sarcopenia   • Weakness   • Anemia   • Fall   • Dizziness   • Acoustic neuroma (Formerly [facility])   • Elevated troponin   • CHF exacerbation (Formerly [facility])   • Elevated serum creatinine     Past Medical History:   Diagnosis Date   • Arthritis    • Asthma  - double pneumonia    Currently on inhaler and nebulizer   • Cancer (Formerly [facility])     cervical cancer, skin cancer   • CHF (congestive heart failure) (Formerly [facility]) 2021   • Chronic kidney disease Related to diabetes   • Coronary artery disease 2021 DX for hear failure   • Diabetes mellitus (Formerly [facility]) 30 years    Seeing Dr. Lam 1st time Aug 19   • Gout    • Hx of colonoscopy    • Hyperlipidemia Reference current labs  x 2-3yrs approx   • Hypertension 30 years   • Migraine    • Mitral valve disease    • Mitral valve disease    • Osteomyelitis (HCC)    • Peripheral neuropathy    • Sleep apnea    • Type 2 diabetes mellitus (HCC)     30 years     Past Surgical History:   Procedure Laterality Date   • ABDOMINAL HYSTERECTOMY W/SALPINGECTOMY     • AMPUTATION  Right great toe, 1st 1/3 metatarsal -Reg 4/14/21   • AORTAGRAM N/A 4/9/2021    Procedure: AORTAGRAM WITH OR WITHOUT RUNOFFS, WITH Co2;  Surgeon: Trey Forrest MD;  Location: [facility];  Service: Vascular;  Laterality: N/A;   • APPENDECTOMY     • BRAIN TUMOR EXCISION      laser surgery    • CARDIAC CATHETERIZATION N/A 6/21/2021    Procedure: LEFT HEART CATH;  Surgeon: Anjum Ceballos MD;  Location: [facility] CATH INVASIVE LOCATION;  Service: Cardiology;  Laterality: N/A;   • CATARACT EXTRACTION W/ INTRAOCULAR LENS  IMPLANT, BILATERAL     • CHOLECYSTECTOMY     • EYE SURGERY     • HYSTERECTOMY     • TOE SURGERY     • TRANS METATARSAL AMPUTATION Right 4/14/2021    Procedure: AMPUTATION TRANS METATARSAL RIGHT GREAT TOE;  Surgeon: Valdez Finney MD;  Location: [facility] OR;  Service: Vascular;  Laterality: Right;      General Information     Row Name 08/09/22 1431          Physical Therapy Time and Intention    Document Type progress note/recertification  -FW     Mode of Treatment physical therapy  -FW     Row Name 08/09/22 1431          General Information    Patient Profile Reviewed yes  -FW     Existing Precautions/Restrictions fall;oxygen therapy device and L/min  -FW     Row Name 08/09/22 1431          Cognition    Orientation Status (Cognition) oriented x 3  -FW     Row Name 08/09/22 1431          Safety Issues, Functional Mobility    Safety Issues Affecting Function (Mobility) judgment;positioning of assistive device;problem-solving;safety precaution awareness;safety precautions follow-through/compliance;sequencing abilities;steps too close to assistive device  -FW      Impairments Affecting Function (Mobility) balance;endurance/activity tolerance;pain;postural/trunk control;range of motion (ROM);strength;shortness of breath  -FW           User Key  (r) = Recorded By, (t) = Taken By, (c) = Cosigned By    Initials Name Provider Type    FW Bret Best, SAHIL Physical Therapist               Mobility     Row Name 08/09/22 1432          Bed Mobility    Bed Mobility scooting/bridging;supine-sit  -FW     Scooting/Bridging [place] (Bed Mobility) maximum assist (25% patient effort);1 person assist  -FW     Supine-Sit [place] (Bed Mobility) moderate assist (50% patient effort);1 person assist  -FW     Assistive Device (Bed Mobility) bed rails;head of bed elevated;draw sheet  -FW     Comment, (Bed Mobility) mod supine sit with difficulty  scooting EOB requiring max A  -FW     Row Name 08/09/22 1432          Sit-Stand Transfer    Sit-Stand [place] (Transfers) minimum assist (75% patient effort)  -FW     Assistive Device (Sit-Stand Transfers) walker, front-wheeled  -FW     Comment, (Sit-Stand Transfer) Pt w/ several BMs during STS and was able to stand ~60 seconds for pradip care before requiring seated rest break  -FW     Row Name 08/09/22 1432          Gait/Stairs (Locomotion)    [place] Level (Gait) verbal cues;nonverbal cues (demo/gesture);1 person assist;1 person to manage equipment;2 person assist;minimum assist (75% patient effort)  -FW     Assistive Device (Gait) walker, front-wheeled  -FW     Distance in Feet (Gait) 8  -FW     Deviations/Abnormal Patterns (Gait) bilateral deviations;base of support, wide;jovita decreased;festinating/shuffling;gait speed decreased;stride length decreased;weight shifting decreased  -FW     Bilateral Gait Deviations forward flexed posture;weight shift ability decreased  -FW     Comment, (Gait/Stairs) pt w/ decreased weight shift to right subsequently had difficulting achieving enough clearance to advance her LLE. Required vc for  upright posture, narrow base of support, and to increase weight shift/step length side to side. Min A for weight shift and RW management. Gait limited by fatigue  -FW           User Key  (r) = Recorded By, (t) = Taken By, (c) = Cosigned By    Initials Name Provider Type    FW Bret Best PT Physical Therapist               Obj/Interventions     Row Name 08/09/22 1437          Range of Motion Comprehensive    Comment, General Range of Motion BLE ROM limited 10%  -     Row Name 08/09/22 1437          Strength Comprehensive (MMT)    General Manual Muscle Testing (MMT) Assessment lower extremity strength deficits identified  -     Comment, General Manual Muscle Testing (MMT) Assessment BLE 4/5 grossly  -FW     Row Name 08/09/22 1437          Motor Skills    Motor Skills functional endurance  -FW     Functional Endurance activity tolerance deficits. Able to stand statically up to 60 seconds  -     Row Name 08/09/22 1437          Balance    Balance Assessment sitting static balance;sitting dynamic balance;standing static balance;standing dynamic balance  -FW     Static Sitting Balance contact guard  -FW     Dynamic Sitting Balance minimal assist;contact guard  -FW     Position, Sitting Balance sitting in chair;sitting edge of bed  -FW     Static Standing Balance contact guard  -FW     Dynamic Standing Balance minimal assist  -FW     Position/Device Used, Standing Balance supported;walker, front-wheeled  -FW           User Key  (r) = Recorded By, (t) = Taken By, (c) = Cosigned By    Initials Name Provider Type    FW Bret Best PT Physical Therapist               Goals/Plan     Row Name 08/09/22 1443          Bed Mobility Goal 1 (PT)    Activity/Assistive Device (Bed Mobility Goal 1, PT) sit to supine/supine to sit  -FW     [place] Level/Cues Needed (Bed Mobility Goal 1, PT) minimum assist (75% or more patient effort)  -FW     Time Frame (Bed Mobility Goal 1, PT) long term goal (LTG);10 days  -FW      Progress/Outcomes (Bed Mobility Goal 1, PT) goal revised this date  -FW     Row Name 08/09/22 1443          Transfer Goal 1 (PT)    Activity/Assistive Device (Transfer Goal 1, PT) sit-to-stand/stand-to-sit  -FW     [place] Level/Cues Needed (Transfer Goal 1, PT) contact guard required  -FW     Time Frame (Transfer Goal 1, PT) long term goal (LTG);10 days  -FW     Progress/Outcome (Transfer Goal 1, PT) goal revised this date  -FW     Row Name 08/09/22 1443          Gait Training Goal 1 (PT)    Activity/Assistive Device (Gait Training Goal 1, PT) gait (walking locomotion);assistive device use  -FW     [place] Level (Gait Training Goal 1, PT) contact guard required  -FW     Distance (Gait Training Goal 1, PT) 25  -FW     Time Frame (Gait Training Goal 1, PT) long term goal (LTG);10 days  -FW     Progress/Outcome (Gait Training Goal 1, PT) goal revised this date  -FW     Row Name 08/09/22 1443          Therapy Assessment/Plan (PT)    Planned Therapy Interventions (PT) balance training;bed mobility training;gait training;home exercise program;joint mobilization;neuromuscular re-education;manual therapy techniques;ROM (range of motion);stair training;strengthening;stretching;transfer training;postural re-education;patient/family education  -FW           User Key  (r) = Recorded By, (t) = Taken By, (c) = Cosigned By    Initials Name Provider Type    FW Bret Best, SAHIL Physical Therapist               Clinical Impression     Row Name 08/09/22 1439          Pain    Pretreatment Pain Rating 0/10 - no pain  -FW     Posttreatment Pain Rating 0/10 - no pain  -FW     Row Name 08/09/22 1439          Plan of Care Review    Plan of Care Reviewed With patient  -FW     Progress improving  -FW     Outcome Evaluation Pt recert: Pt continues with decreased activity tolerance, balance deficits, and generalized weakness limiting her functional mobility. Pt performed bed mobility Mod Ax1, STS min A, and ambulated 8' w/  a RW- min A for RW management and weight shifting/balance. Continue PT POC. Recommend dc SNF  -           User Key  (r) = Recorded By, (t) = Taken By, (c) = Cosigned By    Initials Name Provider Type    Bret Fields PT Physical Therapist               Outcome Measures     Row Name 08/09/22 1445 08/09/22 0750       How much help from another person do you currently need...    Turning from your back to your side while in flat bed without using bedrails? 2  -FW 2  -MK    Moving from lying on back to sitting on the side of a flat bed without bedrails? 2  -FW 2  -MK    Moving to and from a bed to a chair (including a wheelchair)? 3  -FW 2  -MK    Standing up from a chair using your arms (e.g., wheelchair, bedside chair)? 3  -FW 3  -MK    Climbing 3-5 steps with a railing? 1  -FW 1  -MK    To walk in hospital room? 3  -FW 2  -MK    AM-PAC 6 Clicks Score (PT) 14  -FW 12  -MK    Highest level of mobility 4 --> Transferred to chair/commode  -FW 4 --> Transferred to chair/commode  -MK    Row Name 08/09/22 1445          Functional Assessment    Outcome Measure Options AM-PAC 6 Clicks Basic Mobility (PT)  -           User Key  (r) = Recorded By, (t) = Taken By, (c) = Cosigned By    Initials Name Provider Type    Farheen Tavera, RN Registered Nurse    Bret Fields, SAHIL Physical Therapist                             Physical Therapy Education                 Title: PT OT SLP Therapies (Done)     Topic: Physical Therapy (Done)     Point: Mobility training (Done)     Learning Progress Summary           Patient Acceptance, E, VU by  at 8/9/2022 1445   Family Eager, E, VU,NR by  at 8/2/2022 1546    Comment: Reviewed safety/technique with bed mobility, transfers, HEP, importance of elevation of BUE and BLE, PT POC      Show all documentation for this point (4)                 Point: Home exercise program (Done)     Learning Progress Summary           Patient Eager, E, VU,NR by  at 8/2/2022 1546     Comment: Reviewed safety/technique with bed mobility, transfers, HEP, importance of elevation of BUE and BLE, PT POC   Family Eager, E, VU,NR by  at 8/2/2022 1546    Comment: Reviewed safety/technique with bed mobility, transfers, HEP, importance of elevation of BUE and BLE, PT POC      Show all documentation for this point (2)                 Point: Body mechanics (Done)     Learning Progress Summary           Patient Acceptance, E, VU by  at 8/9/2022 1445   Family Eager, E, VU,NR by  at 8/2/2022 1546    Comment: Reviewed safety/technique with bed mobility, transfers, HEP, importance of elevation of BUE and BLE, PT POC      Show all documentation for this point (4)                 Point: Precautions (Done)     Learning Progress Summary           Patient Acceptance, E, VU by  at 8/9/2022 1445   Family Eager, E, VU,NR by  at 8/2/2022 1546    Comment: Reviewed safety/technique with bed mobility, transfers, HEP, importance of elevation of BUE and BLE, PT POC      Show all documentation for this point (4)                             User Key     Initials Effective Dates Name Provider Type Discipline     05/05/22 -  Bret Best, PT Physical Therapist PT     06/01/21 -  Shreon Ashton PT Physical Therapist PT              PT Recommendation and Plan  Planned Therapy Interventions (PT): balance training, bed mobility training, gait training, home exercise program, joint mobilization, neuromuscular re-education, manual therapy techniques, ROM (range of motion), stair training, strengthening, stretching, transfer training, postural re-education, patient/family education  Plan of Care Reviewed With: patient  Progress: improving  Outcome Evaluation: Pt recert: Pt continues with decreased activity tolerance, balance deficits, and generalized weakness limiting her functional mobility. Pt performed bed mobility Mod Ax1, STS min A, and ambulated 8' w/ a RW- min A for RW management and weight shifting/balance.  Continue PT POC. Recommend dc SNF     Time Calculation:    PT Charges     Row Name 08/09/22 1446             Time Calculation    Start Time 1349  -FW      PT Received On 08/09/22  -FW      PT Goal Re-Cert Due Date 08/19/22  -FW              Timed Charges    89794 - Gait Training Minutes  15  -FW      23162 - PT Therapeutic Activity Minutes 24  -FW              Total Minutes    Timed Charges Total Minutes 39  -FW       Total Minutes 39  -FW            User Key  (r) = Recorded By, (t) = Taken By, (c) = Cosigned By    Initials Name Provider Type    FW Bret Best, PT Physical Therapist              Therapy Charges for Today     Code Description Service Date Service Provider Modifiers Qty    16890509019 HC GAIT TRAINING EA 15 MIN 8/9/2022 Bret Best, PT GP 1    50447596455 HC PT THERAPEUTIC ACT EA 15 MIN 8/9/2022 Bret Best, PT GP 2    23187392566 HC PT THER SUPP EA 15 MIN 8/9/2022 Bret Best, PT GP 3          PT G-Codes  Outcome Measure Options: AM-PAC 6 Clicks Basic Mobility (PT)  AM-PAC 6 Clicks Score (PT): 14  AM-PAC 6 Clicks Score (OT): 9    Bret Best PT  8/9/2022

## 2022-08-09 NOTE — PROGRESS NOTES
"    [facility] Medicine Services  PROGRESS NOTE    Patient Name: Susan Anderson  : 1940  MRN: [id number]    Date of Admission: 2022  Primary Care Physician: Arleen Doherty MD    Subjective     CC: ffu weakness, constipation     HPI:  Patient has just awoken from sleep to eat her breakfast. She states she is a \"night owl\" at home and enjoys staying up late and sleeping in. Sitting upright in bed in no acute pain, but reports two episodes of pain overnight relieved by PRN muscle relaxer and pain medication. She states her appetite is improving, and she had a BM yesterday. Complains of nasal congestion, doesn't like wearing nasal cannula but desaturated to 88% on room air      ROS:  Gen- No fevers, chills  CV- No chest pain, palpitations  Resp- No cough, dyspnea (+)  GI- No N/V/D, abd pain    Objective     Vital Signs:   Temp:  [97.5 °F (36.4 °C)-98.7 °F (37.1 °C)] 97.7 °F (36.5 °C)  Heart Rate:  [68-83] 76  Resp:  [13-18] 13  BP: (137-191)/(55-82) 151/67  Flow (L/min):  [2-3] 3     Physical Exam:  Constitutional: Chronically-ill woman in no acute distress, sitting upright in bed conversing appropriately  HENT: NCAT, mucous membranes moist   Respiratory: Fine crackles appreciated in bilateral lower lobes. Desaturated to 88% on room air. Sats improved to 93% on 1L. Normal respiratory effort   Cardiovascular: RRR, no murmurs, rubs, or gallops  Gastrointestinal: Positive bowel sounds, firm, nontender, mildly distended  Musculoskeletal: 1+ pitting BLE edema, BUE trace edema with R > L, right knee pain with improvement since last exam on   Psychiatric: Cooperative, speech clear and spontaneous   Neurologic: Oriented x 3, strength symmetric in all extremities, generalized weakness, Cranial Nerves grossly intact with no focal deficits  Skin: No rashes, diffuse ecchymosis bilateral arms    Results Reviewed:  LAB RESULTS:      Lab 22  0933 22  0752   WBC 5.89 6.16 "   HEMOGLOBIN 9.2* 9.3*   HEMATOCRIT 29.0* 28.8*   PLATELETS 220 200   NEUTROS ABS 4.05  --    IMMATURE GRANS (ABS) 0.08*  --    LYMPHS ABS 0.86  --    MONOS ABS 0.78  --    EOS ABS 0.09  --    MCV 92.4 92.6         Lab 08/07/22  0933 08/06/22  1418 08/05/22  0752 08/04/22  0812 08/03/22  0854   SODIUM 141 143 144 141 143   POTASSIUM 4.1 4.5 4.7 4.2 4.5   CHLORIDE 100 102 102 101 102   CO2 32.0* 29.0 31.0* 32.0* 32.0*   ANION GAP 9.0 12.0 11.0 8.0 9.0   BUN 60* 62* 65* 62* 65*   CREATININE 1.15* 1.24* 1.51* 1.39* 1.38*   EGFR 47.4* 43.3* 34.2* 37.7* 38.1*   GLUCOSE 171* 197* 106* 168* 163*   CALCIUM 9.0 9.2 8.9 8.8 8.7         Lab 08/03/22  0854   PROBNP 3,452.0*     Brief Urine Lab Results  (Last result in the past 365 days)      Color   Clarity   Blood   Leuk Est   Nitrite   Protein   CREAT   Urine HCG        07/31/22 1503             27.0             Microbiology Results Abnormal     Procedure Component Value - Date/Time    Blood Culture - Blood, Arm, Right [[id number]]  (Normal) Collected: 07/29/22 1456    Lab Status: Final result Specimen: Blood from Arm, Right Updated: 08/03/22 1517     Blood Culture No growth at 5 days    Blood Culture - Blood, Hand, Left [[id number]]  (Normal) Collected: 07/29/22 1456    Lab Status: Final result Specimen: Blood from Hand, Left Updated: 08/03/22 1517     Blood Culture No growth at 5 days    Body Fluid Culture - Body Fluid, Knee, Right [[id number]] Collected: 07/29/22 1400    Lab Status: Final result Specimen: Body Fluid from Knee, Right Updated: 08/03/22 1009     Body Fluid Culture No growth at 5 days     Gram Stain Rare (1+) WBCs seen      No organisms seen    Eosinophil Smear - Urine, Urine, Clean Catch [[id number]]  (Normal) Collected: 07/31/22 1503    Lab Status: Final result Specimen: Urine, Clean Catch Updated: 07/31/22 1627     Eosinophil Smear 0 % EOS/100 Cells     Narrative:      No eosinophil seen    COVID PRE-OP / PRE-PROCEDURE SCREENING ORDER (NO ISOLATION) - Swab,  Nasopharynx [[id number]]  (Normal) Collected: 07/26/22 2300    Lab Status: Final result Specimen: Swab from Nasopharynx Updated: 07/26/22 2340    Narrative:      The following orders were created for panel order COVID PRE-OP / PRE-PROCEDURE SCREENING ORDER (NO ISOLATION) - Swab, Nasopharynx.  Procedure                               Abnormality         Status                     ---------                               -----------         ------                     COVID-19 and FLU A/B PCR...[[id number]]  Normal              Final result                 Please view results for these tests on the individual orders.    COVID-19 and FLU A/B PCR - Swab, Nasopharynx [[id number]]  (Normal) Collected: 07/26/22 2300    Lab Status: Final result Specimen: Swab from Nasopharynx Updated: 07/26/22 2340     COVID19 Not Detected     Influenza A PCR Not Detected     Influenza B PCR Not Detected    Narrative:      Fact sheet for providers: https://www.fda.gov/media/494045/download    Fact sheet for patients: https://www.fda.gov/media/224184/download    Test performed by PCR.        No radiology results from the last 24 hrs    Results for orders placed during the hospital encounter of 07/26/22    Adult Transthoracic Echo Complete W/ Cont if Necessary Per Protocol    Interpretation Summary  · Normal left ventricular systolic function, estimated EF 55%.  · Aortic sclerosis without aortic stenosis or regurgitation.  · Mild mitral regurgitation.    I have reviewed the medications:  Scheduled Meds:allopurinol, 300 mg, Oral, Daily  amLODIPine, 5 mg, Oral, Daily  amoxicillin-clavulanate, 1 tablet, Oral, Daily  aspirin, 81 mg, Oral, Daily  atorvastatin, 80 mg, Oral, Nightly  bumetanide, 1 mg, Oral, BID  castor oil-balsam peru, 1 application, Topical, Q12H  Diclofenac Sodium, 2 g, Topical, 4x Daily  guaiFENesin, 1,200 mg, Oral, Q12H  heparin (porcine), 5,000 Units, Subcutaneous, Q12H  hydrALAZINE, 25 mg, Oral, Q8H  insulin detemir, 25 Units,  Subcutaneous, Daily  insulin lispro, 0-7 Units, Subcutaneous, TID AC  Insulin Lispro, 8 Units, Subcutaneous, TID With Meals  isosorbide dinitrate, 20 mg, Oral, BID  lidocaine, 1 patch, Transdermal, Q24H  polyethylene glycol, 17 g, Oral, BID  pregabalin, 50 mg, Oral, Q12H  senna-docusate sodium, 1 tablet, Oral, BID  sodium chloride, 10 mL, Intravenous, Q12H  vitamin B-12, 100 mcg, Oral, Daily      Continuous Infusions:   PRN Meds:.•  acetaminophen **OR** [DISCONTINUED] acetaminophen **OR** [DISCONTINUED] acetaminophen  •  bisacodyl  •  bisacodyl  •  cyclobenzaprine  •  dextrose  •  dextrose  •  diazePAM  •  glucagon (human recombinant)  •  ipratropium-albuterol  •  magnesium hydroxide  •  ondansetron **OR** ondansetron  •  Polyvinyl Alcohol-Povidone PF  •  [COMPLETED] Insert peripheral IV **AND** sodium chloride  •  sodium chloride  •  sodium chloride    Assessment & Plan     Active Hospital Problems    Diagnosis  POA   • **Weakness [R53.1]  Yes   • Anemia [D64.9]  Unknown   • Fall [W19.XXXA]  Unknown   • Dizziness [R42]  Unknown   • Acoustic neuroma (HCC) [D33.3]  Unknown   • Elevated troponin [R77.8]  Unknown   • CHF exacerbation ([facility]) [I50.9]  Unknown   • Elevated serum creatinine [R79.89]  Unknown   • NICM (nonischemic cardiomyopathy) ([facility]) [I42.8]  Yes   • Coronary artery disease involving native coronary artery of native heart without angina pectoris [I25.10]  Yes   • Dyslipidemia [E78.5]  Yes   • Chronic combined systolic and diastolic heart failure (HCC) [I50.42]  Yes   • Mitral valve disease [I05.9]  Yes   • PVD (peripheral vascular disease) ([facility]) [I73.9]  Yes   • Essential hypertension [I10]  Yes   • CKD (chronic kidney disease), stage III ([facility]) [N18.30]  Yes      Resolved Hospital Problems   No resolved problems to display.     Brief Hospital Course to date:  Susan Anderson is a 82 y.o. female with PMH significant for HTN, HLD, CAD, non-ischemic cardiomyopathy, chronic combined systolic/diastolic CHF, CKD  III, PVD, insulin-dependent DMII, ELIUD (on 2L NC QHS, chronic BLE edema, and acoustic neuroma. She was admitted to [facility] 7/26/22 for mild rhabdomyolysis after a fall at home. Also found to have a/c CHF exacerbation and LACEY.      Fall at home  Generalized weakness  - Fall at home in bathroom when she opted not to wait for her bath aide to help her  - Cardiology has evaluated and feel her falls are likely mechanical and not cardiac. May have some orthostasis. Patient does report she feels better with SBP in 150's, so allowing some permissive hypertension   - PT/OT following - [facility] had private room available last week in subacute, CM working on another room for her - needs insurance update  - Patient working with PT / OT     Rhabdomyolysis, resolved   -  on admission, s/p IV fluids      Acute on chronic combined systolic/diastolic CHF  Nonischemic cardiomyopathy   LACEY on CKD III - baseline 1.3-1.5  - Appreciate nephrology assistance  - 8/1 renal artery duplex suggestive of intrinsic disease but no significant renal artery stenosis or obstructive uropathy   - Creatinine 1.9 on admission, has since returned to baseline  - CXR 8/3 showed congestive heart failure with moderate bibasilar effusion/atelectasis  - Nephrology diuresing, changed back to home dose of Bumex 1mg BID on 8/9  - s/p IV albumin   - Fluid restriction per nephrology, strict I & O   - OK for discharge from Nephrology standpoint, per note     Blood culture (+) MRSA, suspected contaminant   - 7/26 - 2 of 2 blood cultures (+) staph epidermis, 1 of 2 positive for MRSA  - Repeat blood cultures on 7/31 NGTD  - Appreciate ID assistance. No source of infection has been identified.  - Discussed with ID on 8/1, suspect contaminant. Antibiotics stopped     Right knee effusion   - Orthopedic surgery has evaluated  - Joint aspiration not consistent with infection. Culture NGTD  - MRI of R knee does not suggest structural injury to the knee  -  WBAT. Knee immobilizer on for comfort  - Follow up with Dr. Pearce in 2-3 weeks      Low back / leg spasms   - PRN Robaxin ineffective per patient  - Discontinued Baclofen due to mental status changes  - Continue Flexeril 5mg TID PRN - seems to be tolerating   - Continue Tylenol PRN  - Valium 4mg QHS PRN only      Chronic anemia   - PO iron supplement      Insulin-dependent DMII   Diabetic peripheral neuropathy  - Hgb A1c 9.0%  - Eating better, having issues with hyperglycemia now   - Increase Levemir to 25 units daily and increase Lispro to 8 units TID AC + SSI  - Continue Lyrica 50mg BID      History of R great toe osteomyelitis  - s/p TMA 4/2021 by Dr. Finney.   - Culture (+) MSSA  - Has been on chronic suppressive Augmentin for ~1 year  - Back on Augmentin 875mg PO daily per ID      Dizziness  Acoustic neuroma, chronic  - ? contributing to falls  - Had initial w/u for neuroma at [facility]  - Has not had recent follow up imaging, consider MRI with/without contrast if GFR continues to improve prior to DC     Essential hypertension  - Beta blocker stopped due to bradycardia  - Restarted Lipitor now that Daptomycin has been stopped  - Continue Amlodipine 5mg daily, Bumex 1mg BID, Hydralazine 25mg TID, Isordil 20mg BID       Constipation  - Continue Miralax / Senna  - BM 8/8    Expected Discharge Location and Transportation: [facility] pending acceptance via EMS or medical transport van   Expected Discharge Date: 8/10    DVT prophylaxis:Medical DVT prophylaxis orders are present.     AM-PAC 6 Clicks Score (PT): 12 (08/09/22 7100)    CODE STATUS:   Code Status and Medical Interventions:   Ordered at: 07/27/22 0143     Code Status (Patient has no pulse and is not breathing):    CPR (Attempt to Resuscitate)     Medical Interventions (Patient has pulse or is breathing):    Full Support     Anna Crystal PA-C  08/09/22

## 2022-08-09 NOTE — CASE MANAGEMENT/SOCIAL WORK
Continued Stay Note  [facility]     Patient Name: Susan Anderson  MRN: [id number]  Today's Date: 8/9/2022    Admit Date: 7/26/2022     Discharge Plan     Row Name 08/09/22 1443       Plan    Plan SNF    Plan Comments I spoke with Delmi with [facility], patient looking too low level for their subacute and no available private rooms at this time. This is upsetting news to her daughter, Katie. Hattie with [facility] to speak with the daughter. I am looking at other options for her and will discuss on going options  with daughter tomorrow. I did ask rehab to work with her today and they will try today or in the morning.    Final Discharge Disposition Code 03 - skilled nursing facility (SNF)               Discharge Codes    No documentation.               Expected Discharge Date and Time     Expected Discharge Date Expected Discharge Time    Aug 10, 2022             Sheron Somers RN

## 2022-08-09 NOTE — PLAN OF CARE
Goal Outcome Evaluation:  Plan of Care Reviewed With: patient        Progress: no change  Outcome Evaluation: Remains sinus on the monitor. Glucoses 226 and 390.  On a specialty bed. Remains sinus on the monoitor. Purewick in use to manage incontinence. Incontinent of bowel today.  Due labs in the morning. Will monitor for a discharge plan.

## 2022-08-10 LAB
ANION GAP SERPL CALCULATED.3IONS-SCNC: 5 MMOL/L (ref 5–15)
BUN SERPL-MCNC: 51 MG/DL (ref 8–23)
BUN/CREAT SERPL: 48.6 (ref 7–25)
CALCIUM SPEC-SCNC: 8.4 MG/DL (ref 8.6–10.5)
CHLORIDE SERPL-SCNC: 102 MMOL/L (ref 98–107)
CO2 SERPL-SCNC: 37 MMOL/L (ref 22–29)
CREAT SERPL-MCNC: 1.05 MG/DL (ref 0.57–1)
EGFRCR SERPLBLD CKD-EPI 2021: 52.8 ML/MIN/1.73
GLUCOSE BLDC GLUCOMTR-MCNC: 134 MG/DL (ref 70–130)
GLUCOSE BLDC GLUCOMTR-MCNC: 189 MG/DL (ref 70–130)
GLUCOSE BLDC GLUCOMTR-MCNC: 197 MG/DL (ref 70–130)
GLUCOSE BLDC GLUCOMTR-MCNC: 230 MG/DL (ref 70–130)
GLUCOSE SERPL-MCNC: 119 MG/DL (ref 65–99)
POTASSIUM SERPL-SCNC: 4.2 MMOL/L (ref 3.5–5.2)
SODIUM SERPL-SCNC: 144 MMOL/L (ref 136–145)

## 2022-08-10 PROCEDURE — 63710000001 INSULIN LISPRO (HUMAN) PER 5 UNITS: Performed by: PHYSICIAN ASSISTANT

## 2022-08-10 PROCEDURE — 99232 SBSQ HOSP IP/OBS MODERATE 35: CPT | Performed by: PHYSICIAN ASSISTANT

## 2022-08-10 PROCEDURE — 0M9N3ZZ DRAINAGE OF RIGHT KNEE BURSA AND LIGAMENT, PERCUTANEOUS APPROACH: ICD-10-PCS | Performed by: INTERNAL MEDICINE

## 2022-08-10 PROCEDURE — 25010000002 HEPARIN (PORCINE) PER 1000 UNITS: Performed by: INTERNAL MEDICINE

## 2022-08-10 PROCEDURE — 63710000001 INSULIN LISPRO (HUMAN) PER 5 UNITS: Performed by: NURSE PRACTITIONER

## 2022-08-10 PROCEDURE — 82962 GLUCOSE BLOOD TEST: CPT

## 2022-08-10 PROCEDURE — 63710000001 INSULIN DETEMIR PER 5 UNITS: Performed by: PHYSICIAN ASSISTANT

## 2022-08-10 PROCEDURE — 80048 BASIC METABOLIC PNL TOTAL CA: CPT | Performed by: PHYSICIAN ASSISTANT

## 2022-08-10 RX ORDER — AMLODIPINE BESYLATE 10 MG/1
10 TABLET ORAL DAILY
Status: DISCONTINUED | OUTPATIENT
Start: 2022-08-11 | End: 2022-08-12 | Stop reason: HOSPADM

## 2022-08-10 RX ORDER — TERAZOSIN 2 MG/1
2 CAPSULE ORAL ONCE
Status: COMPLETED | OUTPATIENT
Start: 2022-08-10 | End: 2022-08-10

## 2022-08-10 RX ORDER — HYDRALAZINE HYDROCHLORIDE 20 MG/ML
10 INJECTION INTRAMUSCULAR; INTRAVENOUS EVERY 6 HOURS PRN
Status: DISCONTINUED | OUTPATIENT
Start: 2022-08-10 | End: 2022-08-12 | Stop reason: HOSPADM

## 2022-08-10 RX ORDER — BUMETANIDE 2 MG/1
2 TABLET ORAL
Status: DISCONTINUED | OUTPATIENT
Start: 2022-08-10 | End: 2022-08-12

## 2022-08-10 RX ORDER — INSULIN LISPRO 100 [IU]/ML
11 INJECTION, SOLUTION INTRAVENOUS; SUBCUTANEOUS
Status: DISCONTINUED | OUTPATIENT
Start: 2022-08-10 | End: 2022-08-11

## 2022-08-10 RX ADMIN — DICLOFENAC 2 G: 10 GEL TOPICAL at 17:00

## 2022-08-10 RX ADMIN — PREGABALIN 50 MG: 50 CAPSULE ORAL at 08:21

## 2022-08-10 RX ADMIN — CASTOR OIL AND BALSAM, PERU 1 APPLICATION: 788; 87 OINTMENT TOPICAL at 08:28

## 2022-08-10 RX ADMIN — CASTOR OIL AND BALSAM, PERU 1 APPLICATION: 788; 87 OINTMENT TOPICAL at 20:45

## 2022-08-10 RX ADMIN — AMLODIPINE BESYLATE 5 MG: 5 TABLET ORAL at 08:22

## 2022-08-10 RX ADMIN — HYDRALAZINE HYDROCHLORIDE 25 MG: 25 TABLET, FILM COATED ORAL at 05:52

## 2022-08-10 RX ADMIN — FERROUS SULFATE TAB 325 MG (65 MG ELEMENTAL FE) 325 MG: 325 (65 FE) TAB at 16:58

## 2022-08-10 RX ADMIN — HYDRALAZINE HYDROCHLORIDE 25 MG: 25 TABLET, FILM COATED ORAL at 13:09

## 2022-08-10 RX ADMIN — AMOXICILLIN AND CLAVULANATE POTASSIUM 1 TABLET: 875; 125 TABLET, FILM COATED ORAL at 08:21

## 2022-08-10 RX ADMIN — INSULIN LISPRO 2 UNITS: 100 INJECTION, SOLUTION INTRAVENOUS; SUBCUTANEOUS at 16:59

## 2022-08-10 RX ADMIN — BUMETANIDE 1 MG: 1 TABLET ORAL at 08:21

## 2022-08-10 RX ADMIN — ALLOPURINOL 300 MG: 300 TABLET ORAL at 08:21

## 2022-08-10 RX ADMIN — INSULIN DETEMIR 25 UNITS: 100 INJECTION, SOLUTION SUBCUTANEOUS at 09:09

## 2022-08-10 RX ADMIN — GUAIFENESIN 1200 MG: 600 TABLET, EXTENDED RELEASE ORAL at 08:21

## 2022-08-10 RX ADMIN — BUMETANIDE 2 MG: 2 TABLET ORAL at 16:59

## 2022-08-10 RX ADMIN — INSULIN LISPRO 8 UNITS: 100 INJECTION, SOLUTION INTRAVENOUS; SUBCUTANEOUS at 09:09

## 2022-08-10 RX ADMIN — DICLOFENAC 2 G: 10 GEL TOPICAL at 20:45

## 2022-08-10 RX ADMIN — FERROUS SULFATE TAB 325 MG (65 MG ELEMENTAL FE) 325 MG: 325 (65 FE) TAB at 08:21

## 2022-08-10 RX ADMIN — HYDRALAZINE HYDROCHLORIDE 25 MG: 25 TABLET, FILM COATED ORAL at 20:46

## 2022-08-10 RX ADMIN — ISOSORBIDE DINITRATE 20 MG: 20 TABLET ORAL at 20:46

## 2022-08-10 RX ADMIN — ATORVASTATIN CALCIUM 80 MG: 40 TABLET, FILM COATED ORAL at 20:46

## 2022-08-10 RX ADMIN — ASPIRIN 81 MG: 81 TABLET, COATED ORAL at 08:21

## 2022-08-10 RX ADMIN — HEPARIN SODIUM 5000 UNITS: 5000 INJECTION INTRAVENOUS; SUBCUTANEOUS at 20:45

## 2022-08-10 RX ADMIN — VITAM B12 100 MCG: 100 TAB at 08:21

## 2022-08-10 RX ADMIN — CYCLOBENZAPRINE 5 MG: 10 TABLET, FILM COATED ORAL at 20:47

## 2022-08-10 RX ADMIN — HEPARIN SODIUM 5000 UNITS: 5000 INJECTION INTRAVENOUS; SUBCUTANEOUS at 08:21

## 2022-08-10 RX ADMIN — INSULIN LISPRO 3 UNITS: 100 INJECTION, SOLUTION INTRAVENOUS; SUBCUTANEOUS at 11:51

## 2022-08-10 RX ADMIN — INSULIN LISPRO 8 UNITS: 100 INJECTION, SOLUTION INTRAVENOUS; SUBCUTANEOUS at 11:51

## 2022-08-10 RX ADMIN — GUAIFENESIN 1200 MG: 600 TABLET, EXTENDED RELEASE ORAL at 20:46

## 2022-08-10 RX ADMIN — DICLOFENAC 2 G: 10 GEL TOPICAL at 08:28

## 2022-08-10 RX ADMIN — TERAZOSIN HYDROCHLORIDE 2 MG: 2 CAPSULE ORAL at 20:45

## 2022-08-10 RX ADMIN — ISOSORBIDE DINITRATE 20 MG: 20 TABLET ORAL at 08:21

## 2022-08-10 RX ADMIN — PREGABALIN 50 MG: 50 CAPSULE ORAL at 20:45

## 2022-08-10 RX ADMIN — INSULIN LISPRO 11 UNITS: 100 INJECTION, SOLUTION INTRAVENOUS; SUBCUTANEOUS at 17:01

## 2022-08-10 RX ADMIN — FERROUS SULFATE TAB 325 MG (65 MG ELEMENTAL FE) 325 MG: 325 (65 FE) TAB at 11:51

## 2022-08-10 RX ADMIN — LIDOCAINE 1 PATCH: 50 PATCH CUTANEOUS at 08:21

## 2022-08-10 NOTE — PROGRESS NOTES
"   LOS: 14 days    Patient Care Team:  Arleen Doherty MD as PCP - General (Internal Medicine)  Flory Sauer APRN (Nurse Practitioner)  Janeth Lam MD as Consulting Physician (Endocrinology)  Anjum Ceballos MD as Consulting Physician (Cardiology)    Chief Complaint:  SOA    Subjective     Interval History:   Stable renal function. LE edema persistent but significantly better.     Review of Systems:   No CP or SOA , fever, chills, rigors, rash, N/V, Constipation.       Objective     Vital Sign Min/Max for last 24 hours  Temp  Min: 97.5 °F (36.4 °C)  Max: 98.3 °F (36.8 °C)   BP  Min: 146/68  Max: 172/83   Pulse  Min: 72  Max: 103   Resp  Min: 14  Max: 16   SpO2  Min: 91 %  Max: 96 %   Flow (L/min)  Min: 1  Max: 3   No data recorded     Flowsheet Rows    Flowsheet Row First Filed Value   Admission Height 160 cm (63\") Documented at 07/26/2022 2039   Admission Weight 90.7 kg (200 lb) Documented at 07/26/2022 2039          I/O this shift:  In: -   Out: 700 [Urine:700]  I/O last 3 completed shifts:  In: 1402 [P.O.:1402]  Out: 1300 [Urine:1300]    Physical Exam:    Gen: Alert, NAD   HENT: NC, AT, EOMI   NECK: Supple, no JVD, Trachea midline   LUNGS: CTA bilaterally, non labored respirtation   CVS: S1/S2 audible, RRR, no murmur   Abd: Soft, NT, ND, BS+   Ext: + pedal edema, no cyanosis   CNS: Alert, No focal deficit noted grossly  Psy: Cooperative  Skin: Warm, dry and intact      WBC No results found for: WBC   HGB No results found for: HGB   HCT No results found for: HCT   Platlets No results found for: LABPLAT   MCV No results found for: MCV       Sodium Sodium   Date Value Ref Range Status   08/10/2022 144 136 - 145 mmol/L Final      Potassium Potassium   Date Value Ref Range Status   08/10/2022 4.2 3.5 - 5.2 mmol/L Final      Chloride Chloride   Date Value Ref Range Status   08/10/2022 102 98 - 107 mmol/L Final      CO2 CO2   Date Value Ref Range Status   08/10/2022 37.0 (H) 22.0 - 29.0 mmol/L Final    "   BUN BUN   Date Value Ref Range Status   08/10/2022 51 (H) 8 - 23 mg/dL Final      Creatinine Creatinine   Date Value Ref Range Status   08/10/2022 1.05 (H) 0.57 - 1.00 mg/dL Final      Calcium Calcium   Date Value Ref Range Status   08/10/2022 8.4 (L) 8.6 - 10.5 mg/dL Final      PO4 No results found for: CAPO4   Albumin No results found for: ALBUMIN   Magnesium No results found for: MG   Uric Acid No results found for: URICACID        Results Review:     I reviewed the patient's new clinical results.    allopurinol, 300 mg, Oral, Daily  amLODIPine, 5 mg, Oral, Daily  amoxicillin-clavulanate, 1 tablet, Oral, Daily  aspirin, 81 mg, Oral, Daily  atorvastatin, 80 mg, Oral, Nightly  bumetanide, 1 mg, Oral, BID  castor oil-balsam peru, 1 application, Topical, Q12H  Diclofenac Sodium, 2 g, Topical, 4x Daily  ferrous sulfate, 325 mg, Oral, TID With Meals  guaiFENesin, 1,200 mg, Oral, Q12H  heparin (porcine), 5,000 Units, Subcutaneous, Q12H  hydrALAZINE, 25 mg, Oral, Q8H  insulin detemir, 25 Units, Subcutaneous, Daily  insulin lispro, 0-7 Units, Subcutaneous, TID AC  Insulin Lispro, 8 Units, Subcutaneous, TID With Meals  isosorbide dinitrate, 20 mg, Oral, BID  lidocaine, 1 patch, Transdermal, Q24H  polyethylene glycol, 17 g, Oral, BID  pregabalin, 50 mg, Oral, Q12H  senna-docusate sodium, 1 tablet, Oral, BID  sodium chloride, 10 mL, Intravenous, Q12H  vitamin B-12, 100 mcg, Oral, Daily           Medication Review:     FINDINGS:   The study was limited to due to patient body habitus and limited  cooperation the right kidney has a maximal emeu-ll-tdsi length of 11.2  cm. The left kidney has a maximal pcdp-ad-jchu length of 12.2 cm. The  echo pattern of both kidneys is normal. There are no masses and there is  no hydronephrosis. The renal veins are patent. Resistive indices on the  right are 0.7 on the left 0.8 maximal renal flow velocities on the right  five and on the left is 13.1 cm/s. The peak systolic velocity in  the  aorta is 107 cm/s. The right renal aortic ratio is 0.5 and the left 0.6.        IMPRESSION:     1. Limited study due to the patient's body habitus and lack of  cooperation.  2. Elevated resistive indices which may reflect underlying intrinsic  renal disease.  3. No evidence of obstructive uropathy or significant renal artery  stenosis.      Assessment & Plan       Weakness    PVD (peripheral vascular disease) (HCC)    Essential hypertension    CKD (chronic kidney disease), stage III (HCC)    Mitral valve disease    NICM (nonischemic cardiomyopathy) ([facility])    Coronary artery disease involving native coronary artery of native heart without angina pectoris    Dyslipidemia    Chronic combined systolic and diastolic heart failure (HCC)    Anemia    Fall    Dizziness    Acoustic neuroma (HCC)    Elevated troponin    CHF exacerbation (HCC)    Elevated serum creatinine      1- LACEY - non oliguric - Likley pre renal etiology.  Scr 1.9 at presentation - Resolved. FeUrea- 31% suggestive of pre-renal etiology. Renal duplex no evidence of significant QUEENIE. resolved  2- CKD stage III -baseline Scr 1.3-1.5 range. 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 Not a candidate for SGLT 2 inhibitor.   5- mild hyponatremia - resolved.   6- Anemia - tsat 12%   7- DM type II - not controlled A1c 9.0%- on insulin.   8- Volume overload - improving.   9- low albumin level      Plan    - bumex 1 mg oral BID.  Stable for discharge from renal stadnpoint.   . Will sign off at this stage.       Cecil Neff MD  08/10/22  12:44 EDT

## 2022-08-10 NOTE — PROGRESS NOTES
INFECTIOUS DISEASE  Progress Note    Susan Anderson  1940  [id number]    Admission Date: 7/26/2022      Requesting Provider: Brigid Seo MD  Evaluating Physician: Stef Pete MD    Reason for Consultation: Bacteremia    History of present illness:    7/29/22: Patient is a 82 y.o. female with h/o T2DM, CHF, CKD Stage III, HTN, chronic BLE edema, chronic right lymphedema, ELIUD/nocturnal 2L O2 NC, acoustic neuroma, CAD, T2DM, gout, HLD, asthma, PVD/right great toe TMA 4/2021/now on oral Augmentin therapy for 6 to 12 weeks from 5/19/22, cervical cancer/hysterectomy, and chronic mixed diastolic/systolic CHF, and NICM with EF 40-45% who we were asked to see for MRSA bacteremia.  The patient presented to [facility] ED on 7/26 after falling at home and pinned between the toilet and her wheelchair.  She lives alone and is unsure what happened, but states that she did not lose consciousness.  She had episode of severe diarrhea before and after falling.  Her daughter found her on the floor and called EMS.  She developed a spasm in her left lower back radiating to her hip and LLE making it difficult to get her up off the floor.  He also scraped her right knee.  Her bumex was recently doubled for 3 days for BLE edema.  She is not very mobile or independent requiring wheelchair for mobilization and requires assistance for showering.  She denies fever, chills, shortness of breath, nausea, vomiting, diarrhea, or dysuria.  She has had some nasal congestion.  On arrival to ED, her Tlow was 93.9 and her HR was bradycardic.  Admitting labs were A1C 9.0, WBC 6300 with 81% segs/2% bands, , creatinine 1.8 (baseline around 1.2-1.4), PCT 0.09, lactic acid 1.1, and UA WBC 0-2.  Blood cultures are positive in 2 of 2 sets (4 of 4 bottles) with GPC and BCID positive for MRSA and CoNS.  A COVID-19/Flu PCR was negative.  Imaging showed no evidence of acute fractures.  She had some swelling of right side of periorbital/cheek soft  tissue, chronic right maxillary sinusitis, mild pulmonary edema, subcutaneous edema of right chest and lateral abdominal wall with small volume ascites similar to 7/13/22, and prepatellar soft tissue swelling c/w hematoma or bursitis.  A CT scan of head showed no bleeds. She is currently on Vancomycin.  ID was asked to evaluate and manage her antibiotic therapy.    Her daughter indicates that she was lying on the floor at home for least 10 hours, unable to arise.  Her usual caretaker did not come to the house that day.    7/30/22: She has remained afebrile. Repeat blood cultures from 7/29 are pending.  Right prepatellar aspirate Gram stain revealed rare white blood cells with no organisms seen. Blood cultures from 726 have grown Staph epidermidis in both sets and MRSA in 1 set. Echocardiogram yesterday revealed mild mitral regurgitation.  She continues to complain of severe right knee pain.  Orthopedics has ordered an MRI scan.    8/1/22: She has remained afebrile. Follow-up blood cultures from 7/29 at been negative.  Right prepatellar aspirate culture is negative so far.  MRI scan of the right knee Revealed a moderate knee effusion which was nonspecific.  She had diffuse circumferential soft tissue swelling with isointense prepatellar collection consistent with hematoma versus infectious bursitis versus posttraumatic.  She was also noted to have complex degenerative tearing of the menisci.  She had an indistinct anterior cruciate ligament. White blood cell count today is 5.8.  Creatinine is 1.43.  She feels better today.  She has some improvement in her right knee pain.     8/2/22: She has remained afebrile.  She still complains of right knee pain.  She was able to get out of bed with assistance yesterday. Her creatinine today is 1.34.  Her white blood cell count is 4.7. Overall, she feels somewhat better.    8/3/22: She has remained afebrile.  She is on oral Augmentin. She denies nausea or vomiting.  She has some  decreased right knee pain.    8/4/22: She remains afebrile. She feels somewhat better today.  She has decreased right knee pain.  She complains of fatigue.    8/5/22: She has remained afebrile.  She has decreased right knee pain.  She is awaiting transfer to rehab.  She complains of an exacerbation of her chronic lumbar back pain.  She is more drowsy today, apparently after pain medications.    8/6/22: She has been severely encephalopathic with psychosis today.  This is thought to be medication induced and her medications are being adjusted.  She has remained afebrile.  She cannot provide a reliable ROS.    8/8/22: Her psychosis is improved.  She remains severely fatigued.  She has been afebrile.  She denies increased right knee pain.    8/9/22: She has remained afebrile.  An MRI scan of Lumbar spine on 8/6 revealed multilevel degenerative changes. White blood cell count on 8/7 was 5.9 and creatinine was 1.15.  The nursing staff indicates that she has been less confused today.  She feels better and started to ambulate.  She denies nausea and vomiting.    8/10/22:  She remains afebrile. She continues to feel better.  She has been less confused.  We are awaiting transfer to a subacute nursing facility. She has a follow-up appointment with me scheduled this fall.        Past Medical History:   Diagnosis Date   • Arthritis    • Asthma 6/4 - double pneumonia    Currently on inhaler and nebulizer   • Cancer (HCC)     cervical cancer, skin cancer   • CHF (congestive heart failure) ([facility]) June 4, 2021   • Chronic kidney disease Related to diabetes   • Coronary artery disease 6/4/2021 DX for hear failure   • Diabetes mellitus (HCC) 30 years    Seeing Dr. Lam 1st time Aug 19   • Gout    • Hx of colonoscopy    • Hyperlipidemia Reference current labs x 2-3yrs approx   • Hypertension 30 years   • Migraine    • Mitral valve disease    • Mitral valve disease    • Osteomyelitis (HCC)    • Peripheral neuropathy    • Sleep apnea     • Type 2 diabetes mellitus (HCC)     30 years       Past Surgical History:   Procedure Laterality Date   • ABDOMINAL HYSTERECTOMY W/SALPINGECTOMY     • AMPUTATION  Right great toe, 1st 1/3 metatarsal -[place] 4/14/21   • AORTAGRAM N/A 4/9/2021    Procedure: AORTAGRAM WITH OR WITHOUT RUNOFFS, WITH Co2;  Surgeon: Trey Forrest MD;  Location:  [facility];  Service: Vascular;  Laterality: N/A;   • APPENDECTOMY     • BRAIN TUMOR EXCISION      laser surgery    • CARDIAC CATHETERIZATION N/A 6/21/2021    Procedure: LEFT HEART CATH;  Surgeon: Anjum Ceballos MD;  Location:  [facility];  Service: Cardiology;  Laterality: N/A;   • CATARACT EXTRACTION W/ INTRAOCULAR LENS  IMPLANT, BILATERAL     • CHOLECYSTECTOMY     • EYE SURGERY     • HYSTERECTOMY     • TOE SURGERY     • TRANS METATARSAL AMPUTATION Right 4/14/2021    Procedure: AMPUTATION TRANS METATARSAL RIGHT GREAT TOE;  Surgeon: Valdez Finney MD;  Location:  [facility];  Service: Vascular;  Laterality: Right;       Family History   Problem Relation Age of Onset   • Diabetes Mother         Type II   • Heart disease Father    • Heart attack Father    • Coronary artery disease Father    • Diabetes Father         Type II   • Diabetes Sister    • Diabetes Maternal Grandmother    • Diabetes Maternal Grandfather    • Diabetes Paternal Grandmother    • Diabetes Paternal Grandfather        Social History     Socioeconomic History   • Marital status:    • Number of children: 1   Tobacco Use   • Smoking status: Never Smoker   • Smokeless tobacco: Never Used   Vaping Use   • Vaping Use: Never used   Substance and Sexual Activity   • Alcohol use: Yes     Comment: Social drinking when not recovering from hospitalization   • Drug use: Never   • Sexual activity: Not Currently     Birth control/protection: None       Allergies   Allergen Reactions   • Cephalexin Nausea Only   • Erythromycin Base Nausea Only   • Melatonin Other (See Comments)     Nightmares   •  Oxycodone Nausea Only   • Sulfa Antibiotics Nausea Only         Medication:    Current Facility-Administered Medications:   •  acetaminophen (TYLENOL) tablet 650 mg, 650 mg, Oral, Q4H PRN, 650 mg at 08/09/22 0727 **OR** [DISCONTINUED] acetaminophen (TYLENOL) 160 MG/5ML solution 650 mg, 650 mg, Oral, Q4H PRN **OR** [DISCONTINUED] acetaminophen (TYLENOL) suppository 650 mg, 650 mg, Rectal, Q4H PRN, [facility], Chey G, DO  •  allopurinol (ZYLOPRIM) tablet 300 mg, 300 mg, Oral, Daily, [facility]ana, Chey G, DO, 300 mg at 08/09/22 0937  •  amLODIPine (NORVASC) tablet 5 mg, 5 mg, Oral, Daily, [facility], Chey G, DO, 5 mg at 08/09/22 0937  •  amoxicillin-clavulanate (AUGMENTIN) 875-125 MG per tablet 1 tablet, 1 tablet, Oral, Daily, Stef Pete MD, 1 tablet at 08/09/22 0937  •  aspirin EC tablet 81 mg, 81 mg, Oral, Daily, [facility], Chey G, DO, 81 mg at 08/09/22 0937  •  atorvastatin (LIPITOR) tablet 80 mg, 80 mg, Oral, Nightly, Anna Crystal PA-C, 80 mg at 08/09/22 2125  •  bisacodyl (DULCOLAX) EC tablet 10 mg, 10 mg, Oral, Daily PRN, Anna Crystal PA-C, 10 mg at 08/07/22 0814  •  bisacodyl (DULCOLAX) suppository 10 mg, 10 mg, Rectal, Daily PRN, Anna Crystal PA-C  •  bumetanide (BUMEX) tablet 1 mg, 1 mg, Oral, BID, Anna Crystal PA-C, 1 mg at 08/09/22 1737  •  castor oil-balsam peru (VENELEX) ointment 1 application, 1 application, Topical, Q12H, Brigid Seo MD, 1 application at 08/09/22 2129  •  cyclobenzaprine (FLEXERIL) tablet 5 mg, 5 mg, Oral, TID PRN, Sandra Bernstein APRN, 5 mg at 08/09/22 0118  •  dextrose (D50W) (25 g/50 mL) IV injection 25 g, 25 g, Intravenous, Q15 Min PRN, Chey Toussaint G, DO  •  dextrose (GLUTOSE) oral gel 15 g, 15 g, Oral, Q15 Min PRN, BreanaChey hawthorne G, DO  •  diazePAM (VALIUM) tablet 4 mg, 4 mg, Oral, Nightly PRN, Anna Crystal, PASundayC  •  Diclofenac Sodium (VOLTAREN) 1 % gel 2 g, 2 g, Topical, 4x Daily, Brigid Seo MD, 2 g at 08/09/22 2282  •  ferrous  sulfate tablet 325 mg, 325 mg, Oral, TID With Meals, Cecil Neff MD, 325 mg at 08/09/22 1737  •  glucagon (human recombinant) (GLUCAGEN DIAGNOSTIC) injection 1 mg, 1 mg, Intramuscular, Q15 Min PRN, Juju Toussainte G, DO  •  guaiFENesin (MUCINEX) 12 hr tablet 1,200 mg, 1,200 mg, Oral, Q12H, Noemy Smith, APRN, 1,200 mg at 08/09/22 2125  •  heparin (porcine) 5000 UNIT/ML injection 5,000 Units, 5,000 Units, Subcutaneous, Q12H, Chey Toussaint, DO, 5,000 Units at 08/09/22 2124  •  hydrALAZINE (APRESOLINE) tablet 25 mg, 25 mg, Oral, Q8H, Ruddy Guerrier, DO, 25 mg at 08/10/22 0552  •  insulin detemir (LEVEMIR) injection 25 Units, 25 Units, Subcutaneous, Daily, Anna Crystal PA-C, 25 Units at 08/09/22 0933  •  Insulin Lispro (humaLOG) injection 0-7 Units, 0-7 Units, Subcutaneous, TID AC, Anna Crystal PA-C, 3 Units at 08/09/22 1736  •  Insulin Lispro (humaLOG) injection 8 Units, 8 Units, Subcutaneous, TID With Meals, Noemy Smith, APRN, 8 Units at 08/09/22 1737  •  ipratropium-albuterol (DUO-NEB) nebulizer solution 3 mL, 3 mL, Nebulization, Q6H PRN, Noemy Smith, APRN  •  isosorbide dinitrate (ISORDIL) tablet 20 mg, 20 mg, Oral, BID, Chey Toussaint, DO, 20 mg at 08/09/22 2125  •  lidocaine (LIDODERM) 5 % 1 patch, 1 patch, Transdermal, Q24H, Chey Toussaint G, DO, 1 patch at 08/09/22 0938  •  magnesium hydroxide (MILK OF MAGNESIA) suspension 10 mL, 10 mL, Oral, Daily PRN, Anna Crystal PA-C, 10 mL at 08/02/22 0820  •  ondansetron (ZOFRAN) tablet 4 mg, 4 mg, Oral, Q6H PRN **OR** ondansetron (ZOFRAN) injection 4 mg, 4 mg, Intravenous, Q6H PRN, Chey Toussaint DO, 4 mg at 08/02/22 0106  •  polyethylene glycol (MIRALAX) packet 17 g, 17 g, Oral, BID, Anna Crystal PA-C, 17 g at 08/09/22 1737  •  Polyvinyl Alcohol-Povidone PF (HYPOTEARS) 1.4-0.6 % ophthalmic solution 1 drop, 1 drop, Both Eyes, Q1H PRN, Noemy Smith, APRN  •  pregabalin (LYRICA) capsule 50 mg, 50 mg,  Oral, Q12H, Bre[facility], Chey G, DO, 50 mg at 22 2125  •  sennosides-docusate (PERICOLACE) 8.6-50 MG per tablet 1 tablet, 1 tablet, Oral, BID, Chey, Ruddy, DO, 1 tablet at 22 0937  •  [COMPLETED] Insert peripheral IV, , , Once **AND** sodium chloride 0.9 % flush 10 mL, 10 mL, Intravenous, PRN, Breana, Chey G, DO  •  sodium chloride 0.9 % flush 10 mL, 10 mL, Intravenous, Q12H, Breana, Chey G, DO, 10 mL at 22 0941  •  sodium chloride 0.9 % flush 10 mL, 10 mL, Intravenous, PRN, Bre[facility], Chey G, DO  •  sodium chloride nasal spray 1 spray, 1 spray, Each Nare, PRN, Noemy Smith, APRN  •  vitamin B-12 (CYANOCOBALAMIN) tablet 100 mcg, 100 mcg, Oral, Daily, Breana, Chey G, DO, 100 mcg at 22 0937    Antibiotics:  Anti-Infectives (From admission, onward)    Ordered     Dose/Rate Route Frequency Start Stop    22 1229  amoxicillin-clavulanate (AUGMENTIN) 875-125 MG per tablet 1 tablet        Ordering Provider: Stef Pete MD    1 tablet Oral Daily 22 1315 22 0859    22 0859  vancomycin (VANCOCIN) 1000 mg/200 mL dextrose 5% IVPB        Ordering Provider: Tom Phelan PharmD    10 mg/kg × 104 kg  over 60 Minutes Intravenous Once 22 1000 22 1028    22 1824  vancomycin 1750 mg/500 mL 0.9% NS IVPB ([facility])        Ordering Provider: Adalid Mcrae RPH    20 mg/kg × 90.7 kg  over 105 Minutes Intravenous Once 22 1915 22 2204            Review of Systems:  See HPI    Physical Exam:   Vital Signs  Temp (24hrs), Av.8 °F (36.6 °C), Min:97.5 °F (36.4 °C), Max:98.2 °F (36.8 °C)    Temp  Min: 97.5 °F (36.4 °C)  Max: 98.2 °F (36.8 °C)  BP  Min: 139/55  Max: 172/83  Pulse  Min: 68  Max: 77  Resp  Min: 13  Max: 16  SpO2  Min: 91 %  Max: 97 %    GENERAL: She remains debilitated appearing but is more alert and responsive.  She is in no acute distress  HEENT: Normocephalic, atraumatic.  PERRL. EOMI. No conjunctival injection. No icterus. No labial  ulcers  NECK: Supple   HEART: RRR; No murmur  LUNGS: Clear to auscultation bilaterally without wheezing, rales, rhonchi. Normal respiratory effort.  ABDOMEN: Soft, nontender, nondistended.   EXT:  No cyanosis, clubbing or edema. No cord.  :  Without Bui catheter.  MSK: Her right pretibial wound is 3-4 cm in diameter with some residual slough but no cellulitis.  There is decreased prepatellar swelling secondary to hematoma. This is continued to steadily improve.  SKIN: Warm and dry without cutaneous eruptions on Inspection/palpation.    NEURO: Alert and responsive.  She is less confused.  She moves all 4 extremities.    Laboratory Data    Results from last 7 days   Lab Units 08/07/22  0933 08/05/22  0752   WBC 10*3/mm3 5.89 6.16   HEMOGLOBIN g/dL 9.2* 9.3*   HEMATOCRIT % 29.0* 28.8*   PLATELETS 10*3/mm3 220 200     Results from last 7 days   Lab Units 08/07/22  0933   SODIUM mmol/L 141   POTASSIUM mmol/L 4.1   CHLORIDE mmol/L 100   CO2 mmol/L 32.0*   BUN mg/dL 60*   CREATININE mg/dL 1.15*   GLUCOSE mg/dL 171*   CALCIUM mg/dL 9.0                             Estimated Creatinine Clearance: 41.4 mL/min (A) (by C-G formula based on SCr of 1.15 mg/dL (H)).      Microbiology:  Microbiology Results (last 10 days)     Procedure Component Value - Date/Time    Eosinophil Smear - Urine, Urine, Clean Catch [[id number]]  (Normal) Collected: 07/31/22 1503    Lab Status: Final result Specimen: Urine, Clean Catch Updated: 07/31/22 1627     Eosinophil Smear 0 % EOS/100 Cells     Narrative:      No eosinophil seen                Radiology:  MRI Lumbar Spine Without Contrast    Result Date: 8/6/2022  1.  Multilevel advanced degenerative changes similar to the more recent imaging.  This report was finalized on 8/6/2022 12:33 PM by Fam Hunt MD.      XR Chest 1 View    Result Date: 8/6/2022  1.  There are findings that could reflect changes from heart failure which have been suggested.  This report was finalized on 8/6/2022 11:16  AM by Fam Hunt MD.      XR Chest 1 View    Result Date: 8/3/2022  Congestive heart failure with moderate bibasilar effusion/atelectasis.  This report was finalized on 8/3/2022 12:17 PM by Dr. Breezy Rey MD.        Impression:   1. 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  The fact that she has 2 positive blood cultures for staph epidermis with MRSA also one of the sets suggest that the isolates all skin contaminants.  In addition, follow-up blood cultures have been negative.  She does not have any focal evidence of MRSA infection.  She is now stable off of intravenous antibiotics  2. Right prepatellar hematoma/right knee pain-her routine x-ray was negative for fracture.  MRI scan does not reveal a fracture or tendon rupture.  3. Recent onset of left pretibial ulcer  4. 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 On chronic Augmentin oral suppressive therapy for about a year now.  H/o MSSA.  5. Hypothermia, improved  6. Acute on chronic kidney disease Stage III, ATN vs rhabdomyolysis  7. Rhadomyolysis, after fall and lying on right side.  8. Right-sided edema/anasarca  9. Elevated LFTs/bilirubin related to above.   10. Chronic BLE edema/RLE lymphedema  11. Type 2 diabetes mellitus, poorly controlled  12. Essential hypertension  13. Nonischemic cardiomyopathy with EF 4-45%  14. Chronic diastolic/systolic mixed CHF  15. Acoustic neuroma  16. Cephelexin, erythromycin, and sulfa intolerances (GI intolerance).    17. Metabolic encephalopathy-Secondary to medication effect and now improved.    PLAN/RECOMMENDATIONS:   1.  Continue off of intravenous antibiotic therapy  2.  Augmentin 875 mg p.o. daily for chronic suppression of right foot infection  3.  Mobilize  4.  Discharge to SNF  5.  Follow-up with me as scheduled this fall    I will sign off    Stef Pete MD  8/10/2022  06:41 EDT

## 2022-08-10 NOTE — DISCHARGE PLACEMENT REQUEST
"Susan Anderson (82 y.o. Female)     Needing short term rehab.   Thank you  Sheron SHAH, RN, [facility]  [phone]        Date of Birth   1940    Social Security Number       Address   [address] [place] [address] [address] [place] [address]    Home Phone   [phone]    MRN   [id number]       Holiness   Christianity    Marital Status                               Admission Date   7/26/22    Admission Type   Emergency    Admitting Provider   Kathy Rodríguez DO    Attending Provider   Kathy Rodríguez DO    Department, Room/Bed   [facility] 3G, S355/1       Discharge Date       Discharge Disposition       Discharge Destination                               Attending Provider: Kathy Rodríguez DO    Allergies: Cephalexin, Erythromycin Base, Melatonin, Oxycodone, Sulfa Antibiotics    Isolation: None   Infection: MRSA (07/27/22)   Code Status: CPR   Advance Care Planning Activity    Ht: 160 cm (62.99\")   Wt: 98.5 kg (217 lb 1.6 oz)    Admission Cmt: None   Principal Problem: Weakness [R53.1]                 Active Insurance as of 7/26/2022     Primary Coverage     Payor Plan Insurance Group Employer/Plan Group    [employer] MEDICARE REPLACEMENT [employer] MEDICARE REPLACEMENT [id number]     Payor Plan Address Payor Plan Phone Number Payor Plan Fax Number Effective Dates    [address] [address]   1/1/2021 - None Entered    [facility] [id number]       Subscriber Name Subscriber Birth Date Member ID       SUSAN ANDERSON 1940 [id number]                 Emergency Contacts      (Rel.) Home Phone Work Phone Mobile Phone    GREG ANDERSON (Daughter) [phone] -- [phone]    MARQUITA BARRIOS (Relative) [phone] -- --            Insurance Information                [employer] MEDICARE REPLACEMENT/[employer] MEDICARE REPLACEMENT Phone: --    Subscriber: Susan Anderson Subscriber#: [id number]    Group#: [id number] Precert#: [id number]        Bret Best, PT    Physical " Therapist   Physical Therapy   Plan of Care      Signed   Date of Service:  22 1349   Creation Time:  22 1446              Signed              Show:Clear all  [x]Manual[x]Template[]Copied    Added by:  [x]Bret Bets, PT      []Rene for details    Goal Outcome Evaluation:  Plan of Care Reviewed With: patient  Progress: improving  Outcome Evaluation: Pt recert: Pt continues with decreased activity tolerance, balance deficits, and generalized weakness limiting her functional mobility. Pt performed bed mobility Mod Ax1, STS min A, and ambulated 8' w/ a RW- min A for RW management and weight shifting/balance. Continue PT POC- goals updated this date. Recommend dc SNF                    Lexy Bryan, OT    Occupational Therapist   Physical Therapy   Plan of Care      Signed   Date of Service:  22   Creation Time:  22              Signed              Show:Clear all  []Manual[x]Template[]Copied    Added by:  [x]Lexy Bryan, OT      []Rene for details    Goal Outcome Evaluation:  Plan of Care Reviewed With: patient, friend  Progress: declining  Outcome Evaluation: Pt limited with lethargy and was oriented x3. She required dependence bed mobility, dependence LB dressing and dependence for lift to chair/ Pt limited with generalized weakness, decreased occupational endurance, dyspnea with minimal exertion with supplemental oxygen and decreased BUE AROM and strength. Recommend SNF.                             History & Physical      Chey Toussaint DO at 22 0030              [facility] Medicine Services  HISTORY AND PHYSICAL    Patient Name: Susan Anderson  : 1940  MRN: [id number]  Primary Care Physician: Arleen Doherty MD  Date of admission: 2022    Subjective   Subjective     Chief Complaint:  Fall    HPI:  Susan Anderson is a 82 y.o. female with PMH of CAD, T2DM - insulin dependent, PVD, osteomyelitis s/p right great  toe amputation 4/2021, HTN, HLD, chronic combined systolic/diastolic CHF, NICM w/ EF 40-45% (6/2021), ELIUD, nocturnal oxygen @ 2L NC, asthma, CKD 3, hyperkalemia, BLE edema and acoustic neuroma on the right side who presents to the ED for evaluation after falling at home this morning and then was not able to get up on her own. Tells me she was pinned between the toilet and her wheelchair in a fetal position on her right side. She denies loss of consciousness but is unable to remember exactly what happened, she thinks she was going to get in the shower. She lives alone. Her daughter was unable to reach her by phone after several attempts and went to check on her, finding her on the floor. EMS was called to assist the patient. She was difficult to move d/t having spasms in her left lower back/hip radiating down her LLE. She notes a scrape on her right knee. Reports chronic dizziness but seems worse today. Also reporting nasal congestion. Daughter notices a wet quality to her voice.    She has chronic BLE edema and increased abdominal edema, yesterday she doubled her bumex per cardiology NP and was to do so for 3 days and then f/u up with cardiology. She tells me it has been difficult to find balance between her fluid status and her kidney disease. Daughter is present assisting with HPI. Patient was hospitalized with pneumonia in March 2021 for ~ 6 weeks and since that time has progressively declined. She had to have toe amputation on her right foot which was complicated by an open wound and requiring patient to be non weight bearing. During this time patient has become less mobile and less independent. After her last hospitalization she did have rehab in patient at [facility] and this did help. She has since mostly been mobile with her wheel chair and is able to transfer to her zero depth entry shower, however she does typically have assistance when showering. Today her help did not show up and she did not want to  wait.    On arrival to the ED she is documented to be hypothermic with temp 93.9, on my exam she has warm blankets and her axillary temp is 96.6; she is bradycardic on the monitor 55-60. Her pain has improved after some IV pain medication. ED imaging w/o any acute fractures, acute vs chronic sinus disease otherwise no acute intracranial abnormalities and CT abd/pelvis w/ asymmetric subcutaneous edema involving the right chest/right lateral abdominal wall, cardiomegaly w/ small right pleural effusion, small volume ascites similar to 7/13/22. , troponin 0.438 w/ proBNP 1304.0; her glucose is elevated at 303; creatinine 1.80 w/ eGFR 27.7 - alk phos 240, alt 37, ast 39, total bili 1.5, albumin 3.10. Her A1c is elevated and she is anemic but anemia seems to be baseline.    Review of Systems   Constitutional: Positive for activity change and fatigue. Negative for chills and fever.   HENT: Positive for congestion and voice change (wet vocal quality).    Eyes: Positive for redness.   Respiratory: Positive for shortness of breath and wheezing. Negative for cough.    Cardiovascular: Positive for leg swelling.   Gastrointestinal: Positive for abdominal distention, abdominal pain and diarrhea. Negative for nausea and vomiting.   Genitourinary: Positive for dysuria.        Incontinence     Skin: Positive for color change.   Neurological: Positive for dizziness.   All other systems reviewed and are negative.    All other systems reviewed and are negative.     Personal History     Past Medical History:   Diagnosis Date   • Arthritis    • Asthma 6/4 - double pneumonia    Currently on inhaler and nebulizer   • Cancer (HCC)     cervical cancer, skin cancer   • CHF (congestive heart failure) ([facility]) June 4, 2021   • Chronic kidney disease Related to diabetes   • Coronary artery disease 6/4/2021 DX for hear failure   • Diabetes mellitus (HCC) 30 years    Seeing Dr. Lam 1st time Aug 19   • Gout    • Hx of colonoscopy    •  Hyperlipidemia Reference current labs x 2-3yrs approx   • Hypertension 30 years   • Migraine    • Mitral valve disease    • Mitral valve disease    • Osteomyelitis (HCC)    • Peripheral neuropathy    • Sleep apnea    • Type 2 diabetes mellitus (HCC)     30 years             Past Surgical History:   Procedure Laterality Date   • ABDOMINAL HYSTERECTOMY W/SALPINGECTOMY     • AMPUTATION  Right great toe, 1st 1/3 metatarsal -[place] 4/14/21   • AORTAGRAM N/A 4/9/2021    Procedure: AORTAGRAM WITH OR WITHOUT RUNOFFS, WITH Co2;  Surgeon: Trey Forrest MD;  Location:  [facility];  Service: Vascular;  Laterality: N/A;   • APPENDECTOMY     • BRAIN TUMOR EXCISION      laser surgery    • CARDIAC CATHETERIZATION N/A 6/21/2021    Procedure: LEFT HEART CATH;  Surgeon: Anjum Ceballos MD;  Location:  [facility];  Service: Cardiology;  Laterality: N/A;   • CATARACT EXTRACTION W/ INTRAOCULAR LENS  IMPLANT, BILATERAL     • CHOLECYSTECTOMY     • EYE SURGERY     • HYSTERECTOMY     • TOE SURGERY     • TRANS METATARSAL AMPUTATION Right 4/14/2021    Procedure: AMPUTATION TRANS METATARSAL RIGHT GREAT TOE;  Surgeon: Valdez Finney MD;  Location:  [facility];  Service: Vascular;  Laterality: Right;       Family History:  family history includes Coronary artery disease in her father; Diabetes in her father, maternal grandfather, maternal grandmother, mother, paternal grandfather, paternal grandmother, and sister; Heart attack in her father; Heart disease in her father. Otherwise pertinent FHx was reviewed and unremarkable.     Social History:  reports that she has never smoked. She has never used smokeless tobacco. She reports current alcohol use. She reports that she does not use drugs.  Social History     Social History Narrative    Lives in [place]        Medications:  Accu-Chek FastClix Lancets, Insulin Syringe-Needle U-100, Insulin Syringe/Needle U-500, allopurinol, amLODIPine, amoxicillin-clavulanate, aspirin,  atorvastatin, bumetanide, carvedilol, clotrimazole-betamethasone, glucose blood, insulin glargine, insulin lispro, isosorbide dinitrate, potassium chloride, pregabalin, and vitamin B-12    Allergies   Allergen Reactions   • Cephalexin Nausea Only   • Erythromycin Base Nausea Only   • Oxycodone Nausea Only   • Sulfa Antibiotics Nausea Only       Objective   Objective     Vital Signs:   Temp:  [93.9 °F (34.4 °C)-97.5 °F (36.4 °C)] 97.5 °F (36.4 °C)  Heart Rate:  [54-62] 58  Resp:  [16-18] 16  BP: (133-149)/(56-89) 149/88    Physical Exam  Constitutional:       General: She is not in acute distress.     Appearance: She is well-developed.   HENT:      Head: Normocephalic and atraumatic.      Nose: Congestion present.      Mouth/Throat:      Mouth: Mucous membranes are moist.      Pharynx: Oropharynx is clear.   Eyes:      Extraocular Movements: Extraocular movements intact.      Conjunctiva/sclera:      Right eye: Hemorrhage present.      Pupils: Pupils are equal, round, and reactive to light.      Comments: Periorbital edema bilaterally, mild     Cardiovascular:      Rate and Rhythm: Regular rhythm. Bradycardia present.      Pulses: Normal pulses.      Heart sounds: Normal heart sounds. No murmur heard.  Pulmonary:      Effort: Pulmonary effort is normal.      Breath sounds: Rhonchi (scattered) and rales present. No wheezing.      Comments: On 2 liters NC  Abdominal:      General: Abdomen is protuberant. Bowel sounds are decreased. There is distension (mild generalized).      Palpations: Abdomen is soft.      Tenderness: There is abdominal tenderness. There is no guarding.   Musculoskeletal:         General: No swelling. Normal range of motion.      Cervical back: Normal range of motion and neck supple.   Skin:     General: Skin is warm and dry.      Capillary Refill: Capillary refill takes less than 2 seconds.      Findings: Bruising (skin fragile with generalized brusing), erythema (BLE red/tender, patient reports  baseline) and lesion (skin tear right knee, wrapped w/ kerlix) present. No rash.   Neurological:      Mental Status: She is alert and oriented to person, place, and time.      Cranial Nerves: No cranial nerve deficit.   Psychiatric:         Mood and Affect: Mood normal.         Behavior: Behavior normal.        Results Reviewed:  I have personally reviewed most recent indicated data and agree with findings including:  [x]  Laboratory  [x]  Radiology  [x]  EKG/Telemetry  []  Pathology  []  Cardiac/Vascular Studies  []  Old records  []  Other:  Most pertinent findings include:      LAB RESULTS:      Lab 07/26/22 2300   WBC 6.29   HEMOGLOBIN 10.1*   HEMATOCRIT 30.4*   PLATELETS 216   NEUTROS ABS 5.22   EOS ABS 0.00   MCV 89.1   PROCALCITONIN 0.09   LACTATE 1.1         Lab 07/26/22 2300   SODIUM 137   POTASSIUM 4.3   CHLORIDE 98   CO2 27.0   ANION GAP 12.0   BUN 93*   CREATININE 1.80*   EGFR 27.7*   GLUCOSE 323*   CALCIUM 8.9   HEMOGLOBIN A1C 9.00*   TSH 3.430         Lab 07/26/22 2300   TOTAL PROTEIN 6.1   ALBUMIN 3.10*   GLOBULIN 3.0   ALT (SGPT) 37*   AST (SGOT) 39*   BILIRUBIN 1.5*  1.5*   INDIRECT BILIRUBIN 1.2   BILIRUBIN DIRECT 0.3   ALK PHOS 240*   LIPASE 30         Lab 07/26/22 2300   PROBNP 1,304.0   TROPONIN T 0.438*             Lab 07/26/22 2300   IRON 48   IRON SATURATION 12*   TIBC 393   TRANSFERRIN 264   FERRITIN 40.61         Brief Urine Lab Results  (Last result in the past 365 days)      Color   Clarity   Blood   Leuk Est   Nitrite   Protein   CREAT   Urine HCG        07/26/22 2301 Yellow   Cloudy   Moderate (2+)   Negative   Negative   >=300 mg/dL (3+)               Microbiology Results (last 10 days)     Procedure Component Value - Date/Time    COVID PRE-OP / PRE-PROCEDURE SCREENING ORDER (NO ISOLATION) - Swab, Nasopharynx [[id number]]  (Normal) Collected: 07/26/22 2300    Lab Status: Final result Specimen: Swab from Nasopharynx Updated: 07/26/22 2340    Narrative:      The following orders were  created for panel order COVID PRE-OP / PRE-PROCEDURE SCREENING ORDER (NO ISOLATION) - Swab, Nasopharynx.  Procedure                               Abnormality         Status                     ---------                               -----------         ------                     COVID-19 and FLU A/B PCR...[[id number]]  Normal              Final result                 Please view results for these tests on the individual orders.    COVID-19 and FLU A/B PCR - Swab, Nasopharynx [[id number]]  (Normal) Collected: 07/26/22 2300    Lab Status: Final result Specimen: Swab from Nasopharynx Updated: 07/26/22 2340     COVID19 Not Detected     Influenza A PCR Not Detected     Influenza B PCR Not Detected    Narrative:      Fact sheet for providers: https://www.fda.gov/media/207011/download    Fact sheet for patients: https://www.fda.gov/media/431911/download    Test performed by PCR.          CT Abdomen Pelvis Without Contrast    Result Date: 7/26/2022  EXAM: CT ABDOMEN PELVIS WO CONTRAST-  DATE OF EXAM: 7/26/2022 10:10 PM  INDICATION: Abdominal pain, fall  COMPARISON: CT abdomen and pelvis dated 07/13/2022.  TECHNIQUE: Contiguous axial CT images were obtained from the lung bases to the pubic symphysis without contrast. Sagittal and coronal reconstructions were performed.  Automated exposure control and iterative reconstruction methods were used.  FINDINGS: The lack of intravenous contrast limits the evaluation of visceral and vascular structures. Images are degraded by motion and streak artifact from patient's arm positioning. There is asymmetric edema involving the right chest wall and right breast which could be secondary to positioning or may indicate trauma to the right side. The heart is enlarged. There are mitral annular calcifications. There is a small right-sided pleural effusion. There is bandlike atelectasis within the right lower lobe.  The liver is normal in size and contour. There is trace perihepatic ascites which  appears stable from the prior examination. The gallbladder is surgically absent. There is no intrahepatic or extrahepatic biliary ductal dilatation. The spleen is normal in size. There are multiple calcified granulomas. The adrenal glands appear within normal limits. There is pancreatic parenchymal volume loss.  The kidneys are symmetric in size. There is no hydronephrosis or urolithiasis. The urinary bladder is fluid-filled without wall thickening. The uterus is surgically absent. There are no adnexal masses.  There is a tiny sliding-type hiatal hernia. The remainder of the gastrointestinal tract appears normal in caliber without evidence of obstruction. There is no evidence of colonic inflammation. There is small volume ascites layering along the right aspect of the abdomen. There is asymmetric edema involving the right anterior abdominal wall which could be positional. The aorta is normal in caliber without evidence of aneurysm formation. There is no mesenteric, retroperitoneal, or pelvic lymphadenopathy by size criteria. There are calcifications at the tendinous insertions of the pelvic musculature. There are multilevel degenerative changes of the lumbar spine. Please see separately dictated lumbar spine CT for full detailed findings.      Impression: 1. Asymmetric subcutaneous edema involving the right chest and right lateral abdominal wall. This could be related to chronic patient positioning or soft tissue contusion. Correlate clinically for bruising along the right side. 2. Cardiomegaly with small right-sided pleural effusion. 3. Small volume ascites within the abdomen and pelvis, similar to the prior CT from 07/13/2022. 4. No CT findings of solid organ injury within the abdomen or pelvis within the limitations of noncontrast technique.    This report was finalized on 7/26/2022 10:46 PM by Cody Diaz MD.      XR Elbow 2 View Right    Result Date: 7/26/2022  EXAMINATION: XR ELBOW 2 VW RIGHT-  DATE OF  EXAM: 7/26/2022 9:55 PM  INDICATION: Fall, pain  COMPARISON: None available  TECHNIQUE: Two views of the right elbow were obtained.  FINDINGS:  There is no acute fracture or dislocation. The elbow joint appears in anatomic alignment. There is no elbow joint effusion. There is soft tissue swelling surrounding the elbow. There is degenerative osteophyte formation at the ulnar humeral joint. Faint early triceps insertional enthesophyte formation.      Impression: 1. No acute osseous abnormality of the right elbow. 2. Soft tissue swelling surrounding the elbow.  This report was finalized on 7/26/2022 10:39 PM by Cody Diaz MD.      XR Knee 1 or 2 View Right    Result Date: 7/26/2022  EXAMINATION: XR KNEE 1 OR 2 VW RIGHT-  DATE OF EXAM: 7/26/2022 9:55 PM  INDICATION: Fall, pain, abrasion.  COMPARISON: None available  TECHNIQUE: One or Two views of the right knee were obtained.  FINDINGS: There is no acute fracture or dislocation. There is tricompartmental degenerative joint space narrowing and osteophyte formation, most notable within the patellofemoral and lateral compartments of the knee. There is a likely small suprapatellar joint effusion. There is prepatellar soft tissue swelling which could represent hematoma or prepatellar bursitis depending on the clinical appearance. There is peripheral vascular atherosclerotic calcification. Bone mineralization is normal. There is no osseous erosion.      Impression: 1. No acute fracture or dislocation. 2. Tricompartmental degenerative joint disease most notable within the patellofemoral and lateral compartments of the knee. 3. Prepatellar soft tissue swelling which could relate to hematoma or prepatellar bursitis. Probable small suprapatellar joint effusion is also present.  This report was finalized on 7/26/2022 11:01 PM by Cody Diaz MD.      CT Head Without Contrast    Result Date: 7/26/2022  Exam: CT HEAD WO CONTRAST-  Date of Exam: 7/26/2022 10:10 PM   Indication: Fall, confusion  Comparison: None available  Technique:  Axial CT images of the head were obtained from skull base to vertex without IV contrast.  Coronal reconstructions were performed. Automated exposure control and iterative reconstruction methods were used.  FINDINGS The ventricles and sulci are proportional without evidence of volume loss or hydrocephalus. There is no mass effect or midline shift. There is no acute intracranial hemorrhage. There is no abnormal extra-axial fluid collection. The gray-white matter differentiation is preserved. The calvarium is intact. The mastoid air cells and middle ears are well aerated. There is an air-fluid level within the right maxillary sinus. The visualized orbits and globes appear symmetric.      Impression: 1. No acute intracranial abnormality. Specifically, no evidence of hemorrhage, mass effect or midline shift 2. Mild air-fluid level within the right maxillary sinus which can be seen with acute or chronic sinus disease.  This report was finalized on 7/26/2022 10:31 PM by Cody Diaz MD.      CT Lumbar Spine Without Contrast    Result Date: 7/26/2022  DATE OF EXAM: 7/26/2022 10:10 PM  PROCEDURE: CT LUMBAR SPINE WO CONTRAST-  INDICATIONS: Fall, low back pain  COMPARISON: CT abdomen and pelvis dated 07/13/2022  TECHNIQUE: Routine transaxial slices were obtained through the lumbar spine without the administration of intravenous contrast. Reconstructed coronal and sagittal images were also obtained. Automated exposure control and iterative construction methods were used.  The radiation dose reduction device was turned on for each scan per the ALARA (As Low as Reasonably Achievable) protocol.  FINDINGS: Image quality is degraded secondary to body habitus. There is unchanged grade 1 anterolisthesis of L4 on L5 by approximately 3 mm. There is vacuum disc phenomenon present at L4-L5. The anterolisthesis is secondary to marked degenerative facet arthropathy.  The remainder of the alignment appears normal. The vertebral body heights are maintained without evidence of acute fracture. There is severe degenerative disc height loss with endplate osteophyte formation and endplate sclerosis at the L2-L3 level. There is no evidence of traumatic subluxation. 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 There is mild spinal canal stenosis secondary to posterior disc osteophyte complex at the L2-L3 level. There is also likely moderate spinal canal stenosis at L4-L5 secondary to anterolisthesis with disc and facet spurring. The SI joints are normally aligned.      Impression: 1. No evidence of acute fracture. 2. Grade 1 anterolisthesis of L4 on L5 secondary to degenerative facet arthropathy. There is likely at least moderate spinal canal stenosis secondary to disc and facet arthropathy. 3. Severe degenerative disc height loss at L2-L3 with endplate osseous spurring and likely mild spinal canal stenosis.  This report was finalized on 7/26/2022 10:55 PM by Cody Diaz MD.      XR Chest 1 View    Result Date: 7/26/2022  EXAMINATION: XR CHEST 1 VW-  DATE OF EXAM: 7/26/2022 9:55 PM  INDICATION: Weakness, fall  COMPARISON: Chest radiograph dated 07/13/2022  TECHNIQUE: Portable AP view of the chest was obtained.  FINDINGS: There is cardiomegaly. Pulmonary vascularity appears normal. There is no focal airspace consolidation. There is a small right-sided pleural effusion. There is no evidence of pneumothorax. There are degenerative changes of the thoracic spine. There are no visible rib fractures.      Impression: 1.  Cardiomegaly with small right-sided pleural effusion. 2.  No evidence of rib fracture or pneumothorax.  This report was finalized on 7/26/2022 10:56 PM by Cody Diaz MD.      CT Maxillofacial Without Contrast    Result Date: 7/26/2022  EXAMINATION: CT MAXILLOFACIAL WO CONT-  DATE OF  EXAM: 7/26/2022 10:10 PM  INDICATION: Fall, Facial injury, Right subconjunctival hemorrhage.    COMPARISONS: CT head dated 07/26/2022  TECHNIQUE: Axial CT images of the maxillofacial bones were obtained without IV contrast. The axial images reconstructed in the sagittal and coronal planes. Automatic exposure control and iterative reconstruction methods were utilized. ALARA was also utilized.  FINDINGS: Examination is motion limited particularly within the inferior aspect of the field-of-view. The nasal bones appear intact. The nasal septum is intact and midline. The osseous margins of the orbits appear intact within the limitations of motion. There is no retrobulbar hematoma. The globes appear intact within the lenses bilaterally. There is right-sided periorbital soft tissue swelling and right-sided cheek edema. The osseous margins of the maxillary sinuses appear grossly intact within the limitations of motion. The pterygoid plates are intact. The zygomatic arches are intact. The mandible appears grossly intact without evidence of fracture or dislocation although significantly limited due to motion.  Visualized intracranial contents appear unremarkable. The mastoid air cells and middle ears are well aerated. There is mild air-fluid level within the right maxillary sinus. There is no cervical lymphadenopathy. There is bilateral parotid gland atrophy. There is atherosclerotic calcification within the carotid vasculature. There is degenerative pannus formation posterior to the dens.      Impression: 1. Motion limited examination. No evidence of acute displaced facial bone fracture. 2. Right-sided periorbital soft tissue swelling and right-sided cheek swelling. No evidence of retrobulbar hematoma or orbital injury. 3. Mild air-fluid level within the dependent portion of the right maxillary sinus.  This report was finalized on 7/26/2022 10:37 PM by Cody Diaz MD.      XR Hip With or Without Pelvis 2 - 3 View  Right    Result Date: 7/26/2022  EXAMINATION: XR HIP W OR WO PELVIS 2-3 VIEW RIGHT-  DATE OF EXAM: 7/26/2022 9:55 PM  INDICATION: Fall, right hip pain  COMPARISON: Pelvis radiographs dated 01/14/2022  TECHNIQUE: 3 views of the right hip were obtained.  FINDINGS: There is no evidence of acute fracture or dislocation. The hips appear normal alignment. There is bilateral degenerative hip joint space narrowing and osteophyte formation. There are degenerative changes of the lower lumbar spine. The ilioischial and iliopectineal lines appear intact. There is peripheral vascular atherosclerotic calcification. There are areas of diffuse enthesopathy with calcification at the tendinous attachments.      Impression: 1. No acute osseous abnormality of the pelvis or right hip. 2. Bilateral hip degenerative joint disease.  This report was finalized on 7/26/2022 10:59 PM by Cody Diaz MD.        Results for orders placed during the hospital encounter of 06/09/22    Adult Transthoracic Echo Complete W/ Cont if Necessary Per Protocol    Interpretation Summary  · Mild left ventricular systolic dysfunction, estimated EF 46-50%.  · Left ventricular diastolic function is consistent with (grade I) impaired relaxation.  · Mild mitral regurgitation.      Assessment & Plan   Assessment & Plan       Weakness    PVD (peripheral vascular disease) ([facility])    Essential hypertension    CKD (chronic kidney disease), stage III ([facility])    Mitral valve disease    NICM (nonischemic cardiomyopathy) ([facility])    Coronary artery disease involving native coronary artery of native heart without angina pectoris    Dyslipidemia    Chronic combined systolic and diastolic heart failure ([facility])    Anemia    Fall    Dizziness    Acoustic neuroma ([facility])    Elevated troponin    CHF exacerbation ([facility])    Elevated serum creatinine    Weakness  Fall  -  indicating mild rhabdo  - NS @ 75ml/hr x 12 hours with IV Lasix due to volume overload.  Defer further IVF's/Lasix  "to day team pending a.m. CK level  - repeat CK in am  - fall precautions  - PT/OT to see  - ?may need rehab  - suggested an \"alert pendant\" so she can call for help  - CM/SW to see  -Hypothermic at presentation?  Unsure if this was a true reading.  Repeat temperature was within normal limits.  Monitor with routine vital signs    Back spasms, low back pain  - imaging reassuring  - PT/OT  - schedule tylenol, heat/cold  - oxycodone for severe pain  - consider low dose muscle relaxant if continued pain      CHF exacerbation  NICM / CHF combined systolic and diastolic  - yesterday was told by cardiology to increase her bumex to 4 mg daily x 3 days, she has had 1 dose  - proBNP 1304.0  -Giving IVF's due to above  - IV Lasix 40 mg x 1  - Strict intake and output  - Daily weights  - Consult cardiology due to need for IVF's with current volume overload, appreciate recommendations    Anemia  - anemia studies  - occult stool    Dizziness  Acoustic neuroma, chronic  - ?contributing to falls  - had initial w/u for neuroma [facility], has not had recent follow up imaging, would like to obtain MRI w/w/o contrast if GFR improves    CAD / Mitral valve disease  - elevated troponin, appears chronic. trend  - denies chest pain    Elevated creatinine with a hx of CKD 3  - baseline creatinine 1.3-1.4, current creatinine 1.80  -tx per above    HTN / HLD  - continue amlodipine, coreg, isosordil  - continue statin, aspirin      DVT prophylaxis:  Heparin SC BID    CODE STATUS:    Code Status (Patient has no pulse and is not breathing): CPR (Attempt to Resuscitate)  Medical Interventions (Patient has pulse or is breathing): Full Support      This note has been completed as part of a split-shared workflow.     Signature: Electronically signed by INDIGO Reyes, 07/27/22, 3:38 AM EDT        Attending   Admission Attestation       I have seen and examined the patient, performing an independent face-to-face diagnostic evaluation with plan of " care reviewed and developed with the advanced practice clinician (APC).      Brief Summary Statement:   Susan Anderson is a 82 y.o. female With a PMH significant for systolic/diastolic CHF, CAD, diabetes mellitus, HLD, HTN, peripheral neuropathy who comes to the ED after a fall at home today.  Patient says that she typically has hired help to come into her home to help her with ADLs such as showering.  Today her helper did not arrive so patient attempted to shower on her own.  After getting in the shower, patient had a fall and was unable to get up on her own.  She laid on the floor for nearly 12 hours before her daughter was unable to reach her by phone.  Daughter came to her home and found her on the floor.  EMS was called patient was brought to the ED.  She denies loss of consciousness.  She does report injury to her knee, elbow and head.  Remainder of detailed HPI is as noted by APC and has been reviewed and/or edited by me for completeness.    Attending Physical Exam:  Constitutional: Awake, alert  Eyes: PERRLA, sclerae anicteric, no conjunctival injection  HENT: NCAT, mucous membranes moist  Neck: Supple, no thyromegaly, no lymphadenopathy, trachea midline  Respiratory: Inspiratory crackles at bilateral lung bases  Cardiovascular: RRR, no murmurs, rubs, or gallops, palpable pedal pulses bilaterally  Gastrointestinal: Positive bowel sounds, soft, nontender, nondistended  Musculoskeletal: 2+ pitting bilateral ankle edema, no clubbing or cyanosis to extremities  Psychiatric: Appropriate affect, cooperative  Neurologic: Oriented x 3, strength symmetric in all extremities, Cranial Nerves grossly intact to confrontation, speech clear  Skin: Erythema to bilateral lower legs      Brief Assessment/Plan :  See detailed assessment and plan developed with APC which I have reviewed and/or edited for completeness.        Chey Toussaint DO  07/27/22                        Electronically signed by Chey Toussaint DO at  07/27/22 0412         Current Facility-Administered Medications   Medication Dose Route Frequency Provider Last Rate Last Admin   • acetaminophen (TYLENOL) tablet 650 mg  650 mg Oral Q4H PRN Breana, Chey G, DO   650 mg at 08/09/22 0727   • allopurinol (ZYLOPRIM) tablet 300 mg  300 mg Oral Daily Breana, Chey G, DO   300 mg at 08/10/22 0821   • amLODIPine (NORVASC) tablet 5 mg  5 mg Oral Daily Breana, Chey G, DO   5 mg at 08/10/22 0822   • amoxicillin-clavulanate (AUGMENTIN) 875-125 MG per tablet 1 tablet  1 tablet Oral Daily Stef Pete MD   1 tablet at 08/10/22 0821   • aspirin EC tablet 81 mg  81 mg Oral Daily Breana, Chey G, DO   81 mg at 08/10/22 0821   • atorvastatin (LIPITOR) tablet 80 mg  80 mg Oral Nightly Anna Crystal PA-C   80 mg at 08/09/22 2125   • bisacodyl (DULCOLAX) EC tablet 10 mg  10 mg Oral Daily PRN Anna Crystal PA-C   10 mg at 08/07/22 0814   • bisacodyl (DULCOLAX) suppository 10 mg  10 mg Rectal Daily PRN Anna Crystal PA-C       • bumetanide (BUMEX) tablet 1 mg  1 mg Oral BID Anna Crystal PA-C   1 mg at 08/10/22 0821   • castor oil-balsam peru (VENELEX) ointment 1 application  1 application Topical Q12H Brigid Seo MD   1 application at 08/10/22 0828   • cyclobenzaprine (FLEXERIL) tablet 5 mg  5 mg Oral TID PRN Sandra Bernstein APRN   5 mg at 08/09/22 0118   • dextrose (D50W) (25 g/50 mL) IV injection 25 g  25 g Intravenous Q15 Min PRN Breana, Chey G, DO       • dextrose (GLUTOSE) oral gel 15 g  15 g Oral Q15 Min PRN Breana, Chey G, DO       • diazePAM (VALIUM) tablet 4 mg  4 mg Oral Nightly PRN Anna Crystal PA-C       • Diclofenac Sodium (VOLTAREN) 1 % gel 2 g  2 g Topical 4x Daily Brigid Seo MD   2 g at 08/10/22 0828   • ferrous sulfate tablet 325 mg  325 mg Oral TID With Meals Cecil Neff MD   325 mg at 08/10/22 1151   • glucagon (human recombinant) (GLUCAGEN DIAGNOSTIC) injection 1 mg  1 mg Intramuscular Q15 Min PRN Breana,  Chey WORRELL, DO       • guaiFENesin (MUCINEX) 12 hr tablet 1,200 mg  1,200 mg Oral Q12H Noemy Smith, APRN   1,200 mg at 08/10/22 0821   • heparin (porcine) 5000 UNIT/ML injection 5,000 Units  5,000 Units Subcutaneous Q12H Chey Toussaint DO   5,000 Units at 08/10/22 0821   • hydrALAZINE (APRESOLINE) tablet 25 mg  25 mg Oral Q8H Ruddy Guerrier DO   25 mg at 08/10/22 1309   • insulin detemir (LEVEMIR) injection 25 Units  25 Units Subcutaneous Daily Anna Crystal PA-C   25 Units at 08/10/22 0909   • Insulin Lispro (humaLOG) injection 0-7 Units  0-7 Units Subcutaneous TID AC Anna Crystal PA-C   3 Units at 08/10/22 1151   • Insulin Lispro (humaLOG) injection 8 Units  8 Units Subcutaneous TID With Meals Noemy Smith, APRN   8 Units at 08/10/22 1151   • ipratropium-albuterol (DUO-NEB) nebulizer solution 3 mL  3 mL Nebulization Q6H PRN Noemy Smith, APRN       • isosorbide dinitrate (ISORDIL) tablet 20 mg  20 mg Oral BID Chey Toussaint DO   20 mg at 08/10/22 0821   • lidocaine (LIDODERM) 5 % 1 patch  1 patch Transdermal Q24H Chey Toussaint DO   1 patch at 08/10/22 0821   • magnesium hydroxide (MILK OF MAGNESIA) suspension 10 mL  10 mL Oral Daily PRN Anna Crystal PA-C   10 mL at 08/02/22 0820   • ondansetron (ZOFRAN) tablet 4 mg  4 mg Oral Q6H PRN Chey Toussaint DO        Or   • ondansetron (ZOFRAN) injection 4 mg  4 mg Intravenous Q6H PRN Chey Toussaint, DO   4 mg at 08/02/22 0106   • polyethylene glycol (MIRALAX) packet 17 g  17 g Oral BID Anna Crystal PA-C   17 g at 08/09/22 1737   • Polyvinyl Alcohol-Povidone PF (HYPOTEARS) 1.4-0.6 % ophthalmic solution 1 drop  1 drop Both Eyes Q1H PRN Noemy Smith, APRN       • pregabalin (LYRICA) capsule 50 mg  50 mg Oral Q12H Chey Toussaint DO   50 mg at 08/10/22 0821   • sennosides-docusate (PERICOLACE) 8.6-50 MG per tablet 1 tablet  1 tablet Oral BID Ruddy Guerrier DO   1 tablet at 08/09/22 0937   • sodium  "chloride 0.9 % flush 10 mL  10 mL Intravenous PRN Breana, Chey G, DO       • sodium chloride 0.9 % flush 10 mL  10 mL Intravenous Q12H Breana, Chey G, DO   10 mL at 08/09/22 0941   • sodium chloride 0.9 % flush 10 mL  10 mL Intravenous PRN Breana, Chey G, DO       • sodium chloride nasal spray 1 spray  1 spray Each Nare PRN Noemy Smith, INDIGO       • vitamin B-12 (CYANOCOBALAMIN) tablet 100 mcg  100 mcg Oral Daily Breana, Chey G, DO   100 mcg at 08/10/22 0821        Physician Progress Notes (most recent note)      Cecil Neff MD at 08/10/22 1245             LOS: 14 days    Patient Care Team:  Arleen Doherty MD as PCP - General (Internal Medicine)  Flory Sauer APRN (Nurse Practitioner)  Janeth Lam MD as Consulting Physician (Endocrinology)  Anjum Ceballos MD as Consulting Physician (Cardiology)    Chief Complaint:  SOA    Subjective     Interval History:   Stable renal function. LE edema persistent but significantly better.     Review of Systems:   No CP or SOA , fever, chills, rigors, rash, N/V, Constipation.       Objective     Vital Sign Min/Max for last 24 hours  Temp  Min: 97.5 °F (36.4 °C)  Max: 98.3 °F (36.8 °C)   BP  Min: 146/68  Max: 172/83   Pulse  Min: 72  Max: 103   Resp  Min: 14  Max: 16   SpO2  Min: 91 %  Max: 96 %   Flow (L/min)  Min: 1  Max: 3   No data recorded     Flowsheet Rows    Flowsheet Row First Filed Value   Admission Height 160 cm (63\") Documented at 07/26/2022 2039   Admission Weight 90.7 kg (200 lb) Documented at 07/26/2022 2039          I/O this shift:  In: -   Out: 700 [Urine:700]  I/O last 3 completed shifts:  In: 1402 [P.O.:1402]  Out: 1300 [Urine:1300]    Physical Exam:    Gen: Alert, NAD   HENT: NC, AT, EOMI   NECK: Supple, no JVD, Trachea midline   LUNGS: CTA bilaterally, non labored respirtation   CVS: S1/S2 audible, RRR, no murmur   Abd: Soft, NT, ND, BS+   Ext: + pedal edema, no cyanosis   CNS: Alert, No focal deficit noted grossly  Psy: " Cooperative  Skin: Warm, dry and intact      WBC No results found for: WBC   HGB No results found for: HGB   HCT No results found for: HCT   Platlets No results found for: LABPLAT   MCV No results found for: MCV       Sodium Sodium   Date Value Ref Range Status   08/10/2022 144 136 - 145 mmol/L Final      Potassium Potassium   Date Value Ref Range Status   08/10/2022 4.2 3.5 - 5.2 mmol/L Final      Chloride Chloride   Date Value Ref Range Status   08/10/2022 102 98 - 107 mmol/L Final      CO2 CO2   Date Value Ref Range Status   08/10/2022 37.0 (H) 22.0 - 29.0 mmol/L Final      BUN BUN   Date Value Ref Range Status   08/10/2022 51 (H) 8 - 23 mg/dL Final      Creatinine Creatinine   Date Value Ref Range Status   08/10/2022 1.05 (H) 0.57 - 1.00 mg/dL Final      Calcium Calcium   Date Value Ref Range Status   08/10/2022 8.4 (L) 8.6 - 10.5 mg/dL Final      PO4 No results found for: CAPO4   Albumin No results found for: ALBUMIN   Magnesium No results found for: MG   Uric Acid No results found for: URICACID        Results Review:     I reviewed the patient's new clinical results.    allopurinol, 300 mg, Oral, Daily  amLODIPine, 5 mg, Oral, Daily  amoxicillin-clavulanate, 1 tablet, Oral, Daily  aspirin, 81 mg, Oral, Daily  atorvastatin, 80 mg, Oral, Nightly  bumetanide, 1 mg, Oral, BID  castor oil-balsam peru, 1 application, Topical, Q12H  Diclofenac Sodium, 2 g, Topical, 4x Daily  ferrous sulfate, 325 mg, Oral, TID With Meals  guaiFENesin, 1,200 mg, Oral, Q12H  heparin (porcine), 5,000 Units, Subcutaneous, Q12H  hydrALAZINE, 25 mg, Oral, Q8H  insulin detemir, 25 Units, Subcutaneous, Daily  insulin lispro, 0-7 Units, Subcutaneous, TID AC  Insulin Lispro, 8 Units, Subcutaneous, TID With Meals  isosorbide dinitrate, 20 mg, Oral, BID  lidocaine, 1 patch, Transdermal, Q24H  polyethylene glycol, 17 g, Oral, BID  pregabalin, 50 mg, Oral, Q12H  senna-docusate sodium, 1 tablet, Oral, BID  sodium chloride, 10 mL, Intravenous,  Q12H  vitamin B-12, 100 mcg, Oral, Daily           Medication Review:     FINDINGS:   The study was limited to due to patient body habitus and limited  cooperation the right kidney has a maximal rcri-fl-ohqf length of 11.2  cm. The left kidney has a maximal rxld-oc-vocl length of 12.2 cm. The  echo pattern of both kidneys is normal. There are no masses and there is  no hydronephrosis. The renal veins are patent. Resistive indices on the  right are 0.7 on the left 0.8 maximal renal flow velocities on the right  five and on the left is 13.1 cm/s. The peak systolic velocity in the  aorta is 107 cm/s. The right renal aortic ratio is 0.5 and the left 0.6.        IMPRESSION:     1. Limited study due to the patient's body habitus and lack of  cooperation.  2. Elevated resistive indices which may reflect underlying intrinsic  renal disease.  3. No evidence of obstructive uropathy or significant renal artery  stenosis.      Assessment & Plan       Weakness    PVD (peripheral vascular disease) ([facility])    Essential hypertension    CKD (chronic kidney disease), stage III ([facility])    Mitral valve disease    NICM (nonischemic cardiomyopathy) ([facility])    Coronary artery disease involving native coronary artery of native heart without angina pectoris    Dyslipidemia    Chronic combined systolic and diastolic heart failure ([facility])    Anemia    Fall    Dizziness    Acoustic neuroma ([facility])    Elevated troponin    CHF exacerbation ([facility])    Elevated serum creatinine      1- LACEY - non oliguric - Likley pre renal etiology.  Scr 1.9 at presentation - Resolved. FeUrea- 31% suggestive of pre-renal etiology. Renal duplex no evidence of significant QUEENIE. resolved  2- CKD stage III -baseline Scr 1.3-1.5 range. 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 Not a candidate for SGLT 2 inhibitor.   5- mild hyponatremia - resolved.   6- Anemia - tsat 12%   7- DM type II - not controlled A1c 9.0%- on insulin.   8- Volume  overload - improving.   9- low albumin level      Plan    - bumex 1 mg oral BID.  Stable for discharge from renal stadnpoint.   . Will sign off at this stage.       Cecil Neff MD  08/10/22  12:44 EDT        Electronically signed by Cecil Neff MD at 08/10/22 1245       Stef Pete MD    Physician   Infectious Disease   Progress Notes      Signed   Date of Service:  08/09/22 0646   Creation Time:  08/09/22 0646              Signed        Expand All Collapse All        Show:Clear all  [x]Manual[x]Template[x]Copied    Added by:  [x]Stef Pete MD      []Kailashver for details    INFECTIOUS DISEASE  Progress Note     Susan Anderson  1940  [id number]     Admission Date: 7/26/2022        Requesting Provider: Brigid Seo MD  Evaluating Physician: Stef Pete MD     Reason for Consultation: Bacteremia     History of present illness:    7/29/22: Patient is a 82 y.o. female with h/o T2DM, CHF, CKD Stage III, HTN, chronic BLE edema, chronic right lymphedema, ELIUD/nocturnal 2L O2 NC, acoustic neuroma, CAD, T2DM, gout, HLD, asthma, PVD/right great toe TMA 4/2021/now on oral Augmentin therapy for 6 to 12 weeks from 5/19/22, cervical cancer/hysterectomy, and chronic mixed diastolic/systolic CHF, and NICM with EF 40-45% who we were asked to see for MRSA bacteremia.  The patient presented to [facility] ED on 7/26 after falling at home and pinned between the toilet and her wheelchair.  She lives alone and is unsure what happened, but states that she did not lose consciousness.  She had episode of severe diarrhea before and after falling.  Her daughter found her on the floor and called EMS.  She developed a spasm in her left lower back radiating to her hip and LLE making it difficult to get her up off the floor.  He also scraped her right knee.  Her bumex was recently doubled for 3 days for BLE edema.  She is not very mobile or independent requiring wheelchair for mobilization and requires assistance for showering.   She denies fever, chills, shortness of breath, nausea, vomiting, diarrhea, or dysuria.  She has had some nasal congestion.  On arrival to ED, her Tlow was 93.9 and her HR was bradycardic.  Admitting labs were A1C 9.0, WBC 6300 with 81% segs/2% bands, , creatinine 1.8 (baseline around 1.2-1.4), PCT 0.09, lactic acid 1.1, and UA WBC 0-2.  Blood cultures are positive in 2 of 2 sets (4 of 4 bottles) with GPC and BCID positive for MRSA and CoNS.  A COVID-19/Flu PCR was negative.  Imaging showed no evidence of acute fractures.  She had some swelling of right side of periorbital/cheek soft tissue, chronic right maxillary sinusitis, mild pulmonary edema, subcutaneous edema of right chest and lateral abdominal wall with small volume ascites similar to 7/13/22, and prepatellar soft tissue swelling c/w hematoma or bursitis.  A CT scan of head showed no bleeds. She is currently on Vancomycin.  ID was asked to evaluate and manage her antibiotic therapy.    Her daughter indicates that she was lying on the floor at home for least 10 hours, unable to arise.  Her usual caretaker did not come to the house that day.     7/30/22: She has remained afebrile. Repeat blood cultures from 7/29 are pending.  Right prepatellar aspirate Gram stain revealed rare white blood cells with no organisms seen. Blood cultures from 726 have grown Staph epidermidis in both sets and MRSA in 1 set. Echocardiogram yesterday revealed mild mitral regurgitation.  She continues to complain of severe right knee pain.  Orthopedics has ordered an MRI scan.     8/1/22: She has remained afebrile. Follow-up blood cultures from 7/29 at been negative.  Right prepatellar aspirate culture is negative so far.  MRI scan of the right knee Revealed a moderate knee effusion which was nonspecific.  She had diffuse circumferential soft tissue swelling with isointense prepatellar collection consistent with hematoma versus infectious bursitis versus posttraumatic.  She was  also noted to have complex degenerative tearing of the menisci.  She had an indistinct anterior cruciate ligament. White blood cell count today is 5.8.  Creatinine is 1.43.  She feels better today.  She has some improvement in her right knee pain.      8/2/22: She has remained afebrile.  She still complains of right knee pain.  She was able to get out of bed with assistance yesterday. Her creatinine today is 1.34.  Her white blood cell count is 4.7. Overall, she feels somewhat better.     8/3/22: She has remained afebrile.  She is on oral Augmentin. She denies nausea or vomiting.  She has some decreased right knee pain.     8/4/22: She remains afebrile. She feels somewhat better today.  She has decreased right knee pain.  She complains of fatigue.     8/5/22: She has remained afebrile.  She has decreased right knee pain.  She is awaiting transfer to rehab.  She complains of an exacerbation of her chronic lumbar back pain.  She is more drowsy today, apparently after pain medications.     8/6/22: She has been severely encephalopathic with psychosis today.  This is thought to be medication induced and her medications are being adjusted.  She has remained afebrile.  She cannot provide a reliable ROS.     8/8/22: Her psychosis is improved.  She remains severely fatigued.  She has been afebrile.  She denies increased right knee pain.     8/9/22: She has remained afebrile.  An MRI scan of Lumbar spine on 8/6 revealed multilevel degenerative changes. White blood cell count on 8/7 was 5.9 and creatinine was 1.15.  The nursing staff indicates that she has been less confused today.  She feels better and started to ambulate.  She denies nausea and vomiting.           Medical History        Past Medical History:   Diagnosis Date   • Arthritis     • Asthma 6/4 - double pneumonia     Currently on inhaler and nebulizer   • Cancer ([facility])       cervical cancer, skin cancer   • CHF (congestive heart failure) ([facility]) June 4, 2021   •  Chronic kidney disease Related to diabetes   • Coronary artery disease 6/4/2021 DX for hear failure   • Diabetes mellitus ([facility]) 30 years     Seeing Dr. Lam 1st time Aug 19   • Gout     • Hx of colonoscopy     • Hyperlipidemia Reference current labs x 2-3yrs approx   • Hypertension 30 years   • Migraine     • Mitral valve disease     • Mitral valve disease     • Osteomyelitis ([facility])     • Peripheral neuropathy     • Sleep apnea     • Type 2 diabetes mellitus ([facility])       30 years            Surgical History         Past Surgical History:   Procedure Laterality Date   • ABDOMINAL HYSTERECTOMY W/SALPINGECTOMY       • AMPUTATION   Right great toe, 1st 1/3 metatarsal -[place] 4/14/21   • AORTAGRAM N/A 4/9/2021     Procedure: AORTAGRAM WITH OR WITHOUT RUNOFFS, WITH Co2;  Surgeon: Trey Forrest MD;  Location:  [facility];  Service: Vascular;  Laterality: N/A;   • APPENDECTOMY       • BRAIN TUMOR EXCISION         laser surgery    • CARDIAC CATHETERIZATION N/A 6/21/2021     Procedure: LEFT HEART CATH;  Surgeon: Anjum Ceballos MD;  Location:  [facility] CATH INVASIVE LOCATION;  Service: Cardiology;  Laterality: N/A;   • CATARACT EXTRACTION W/ INTRAOCULAR LENS  IMPLANT, BILATERAL       • CHOLECYSTECTOMY       • EYE SURGERY       • HYSTERECTOMY       • TOE SURGERY       • TRANS METATARSAL AMPUTATION Right 4/14/2021     Procedure: AMPUTATION TRANS METATARSAL RIGHT GREAT TOE;  Surgeon: Valdez Finney MD;  Location: [facility] OR;  Service: Vascular;  Laterality: Right;                  Family History   Problem Relation Age of Onset   • Diabetes Mother           Type II   • Heart disease Father     • Heart attack Father     • Coronary artery disease Father     • Diabetes Father           Type II   • Diabetes Sister     • Diabetes Maternal Grandmother     • Diabetes Maternal Grandfather     • Diabetes Paternal Grandmother     • Diabetes Paternal Grandfather           Social History   Social History            Socioeconomic History   •  Marital status:    • Number of children: 1   Tobacco Use   • Smoking status: Never Smoker   • Smokeless tobacco: Never Used   Vaping Use   • Vaping Use: Never used   Substance and Sexual Activity   • Alcohol use: Yes       Comment: Social drinking when not recovering from hospitalization   • Drug use: Never   • Sexual activity: Not Currently       Birth control/protection: None                  Allergies   Allergen Reactions   • Cephalexin Nausea Only   • Erythromycin Base Nausea Only   • Melatonin Other (See Comments)       Nightmares   • Oxycodone Nausea Only   • Sulfa Antibiotics Nausea Only            Medication:     Current Facility-Administered Medications:   •  acetaminophen (TYLENOL) tablet 650 mg, 650 mg, Oral, Q4H PRN, 650 mg at 08/07/22 1329 **OR** [DISCONTINUED] acetaminophen (TYLENOL) 160 MG/5ML solution 650 mg, 650 mg, Oral, Q4H PRN **OR** [DISCONTINUED] acetaminophen (TYLENOL) suppository 650 mg, 650 mg, Rectal, Q4H PRN, Breana, Chey G, DO  •  allopurinol (ZYLOPRIM) tablet 300 mg, 300 mg, Oral, Daily, Breana, Chey G, DO, 300 mg at 08/08/22 0822  •  amLODIPine (NORVASC) tablet 5 mg, 5 mg, Oral, Daily, Breana, Chey G, DO, 5 mg at 08/08/22 0822  •  amoxicillin-clavulanate (AUGMENTIN) 875-125 MG per tablet 1 tablet, 1 tablet, Oral, Daily, Stef Pete MD, 1 tablet at 08/08/22 0821  •  aspirin EC tablet 81 mg, 81 mg, Oral, Daily, Breana, Chey G, DO, 81 mg at 08/08/22 0822  •  atorvastatin (LIPITOR) tablet 80 mg, 80 mg, Oral, Nightly, Anna Crystal PA-C, 80 mg at 08/08/22 2042  •  bisacodyl (DULCOLAX) EC tablet 10 mg, 10 mg, Oral, Daily PRN, Anna Crystal PA-C, 10 mg at 08/07/22 0814  •  bisacodyl (DULCOLAX) suppository 10 mg, 10 mg, Rectal, Daily PRN, Marchik, Anna S., PA-C  •  castor oil-balsam peru (VENELEX) ointment 1 application, 1 application, Topical, Q12H, Brigid Seo MD, 1 application at 08/08/22 2044  •  cyclobenzaprine (FLEXERIL) tablet 5 mg, 5 mg, Oral,  TID PRN, Sandra Bernstein APRN, 5 mg at 08/09/22 0118  •  dextrose (D50W) (25 g/50 mL) IV injection 25 g, 25 g, Intravenous, Q15 Min PRN, Chey Toussaint G, DO  •  dextrose (GLUTOSE) oral gel 15 g, 15 g, Oral, Q15 Min PRN, Chey Toussaint G, DO  •  diazePAM (VALIUM) tablet 4 mg, 4 mg, Oral, Q12H PRN, Sandra Bernstein APRN, 4 mg at 08/09/22 0119  •  Diclofenac Sodium (VOLTAREN) 1 % gel 2 g, 2 g, Topical, 4x Daily, Brigid Seo MD, 2 g at 08/08/22 2044  •  glucagon (human recombinant) (GLUCAGEN DIAGNOSTIC) injection 1 mg, 1 mg, Intramuscular, Q15 Min PRN, Chey Toussaint G, DO  •  guaiFENesin (MUCINEX) 12 hr tablet 1,200 mg, 1,200 mg, Oral, Q12H, Noemy Smith, APRN, 1,200 mg at 08/08/22 2042  •  heparin (porcine) 5000 UNIT/ML injection 5,000 Units, 5,000 Units, Subcutaneous, Q12H, Chey Toussaint, DO, 5,000 Units at 08/08/22 2042  •  hydrALAZINE (APRESOLINE) tablet 25 mg, 25 mg, Oral, Q8H, Ruddy Guerrier, DO, 25 mg at 08/09/22 0548  •  insulin detemir (LEVEMIR) injection 22 Units, 22 Units, Subcutaneous, Daily, Anna Crystal PA-C, 22 Units at 08/08/22 0823  •  Insulin Lispro (humaLOG) injection 0-7 Units, 0-7 Units, Subcutaneous, TID AC, Anna Crystal PA-C, 3 Units at 08/08/22 1700  •  Insulin Lispro (humaLOG) injection 8 Units, 8 Units, Subcutaneous, TID With Meals, Noemy Smith, APRN  •  ipratropium-albuterol (DUO-NEB) nebulizer solution 3 mL, 3 mL, Nebulization, Q6H PRN, Noemy Smith, APRN  •  isosorbide dinitrate (ISORDIL) tablet 20 mg, 20 mg, Oral, BID, BreanaChey hawthorne G, DO, 20 mg at 08/08/22 2042  •  lidocaine (LIDODERM) 5 % 1 patch, 1 patch, Transdermal, Q24H, BreanaChey hawthorne G, DO, 1 patch at 08/08/22 0822  •  magnesium hydroxide (MILK OF MAGNESIA) suspension 10 mL, 10 mL, Oral, Daily PRN, Anna Crystal PA-C, 10 mL at 08/02/22 0820  •  ondansetron (ZOFRAN) tablet 4 mg, 4 mg, Oral, Q6H PRN **OR** ondansetron (ZOFRAN) injection 4 mg, 4 mg, Intravenous, Q6H PRN, Breana  Chey G, DO, 4 mg at 22 0106  •  polyethylene glycol (MIRALAX) packet 17 g, 17 g, Oral, BID, Anna Crystal PA-C, 17 g at 22 1700  •  Polyvinyl Alcohol-Povidone PF (HYPOTEARS) 1.4-0.6 % ophthalmic solution 1 drop, 1 drop, Both Eyes, Q1H PRN, Noemy Smith, APRN  •  pregabalin (LYRICA) capsule 50 mg, 50 mg, Oral, Q12H, Breana, Chey G, DO, 50 mg at 22  •  sennosides-docusate (PERICOLACE) 8.6-50 MG per tablet 1 tablet, 1 tablet, Oral, BID, Ruddy Guerrier, DO, 1 tablet at 22  •  [COMPLETED] Insert peripheral IV, , , Once **AND** sodium chloride 0.9 % flush 10 mL, 10 mL, Intravenous, PRN, Breana, Chey G, DO  •  sodium chloride 0.9 % flush 10 mL, 10 mL, Intravenous, Q12H, Breana, Chey G, DO, 10 mL at 229  •  sodium chloride 0.9 % flush 10 mL, 10 mL, Intravenous, PRN, Breana, Chey G, DO  •  sodium chloride nasal spray 1 spray, 1 spray, Each Nare, PRN, Noemy Smith, APRN  •  vitamin B-12 (CYANOCOBALAMIN) tablet 100 mcg, 100 mcg, Oral, Daily, Breana, Chey G, DO, 100 mcg at 22 0822     Antibiotics:              Anti-Infectives (From admission, onward)     Ordered     Dose/Rate Route Frequency Start Stop     22 1229   amoxicillin-clavulanate (AUGMENTIN) 875-125 MG per tablet 1 tablet        Ordering Provider: Stef Pete MD    1 tablet Oral Daily 22 1315 22 0859     22 0859   vancomycin (VANCOCIN) 1000 mg/200 mL dextrose 5% IVPB        Ordering Provider: Tom Phelan, PharmD    10 mg/kg × 104 kg  over 60 Minutes Intravenous Once 22 1000 22 1028     22 1824   vancomycin 1750 mg/500 mL 0.9% NS IVPB ([facility])        Ordering Provider: Adalid Mcrae RPH    20 mg/kg × 90.7 kg  over 105 Minutes Intravenous Once 22                Review of Systems:  See HPI     Physical Exam:   Vital Signs  Temp (24hrs), Av.9 °F (36.6 °C), Min:97.5 °F (36.4 °C), Max:98.7 °F (37.1 °C)     Temp  Min:  97.5 °F (36.4 °C)  Max: 98.7 °F (37.1 °C)  BP  Min: 137/62  Max: 191/82  Pulse  Min: 70  Max: 83  Resp  Min: 18  Max: 18  SpO2  Min: 94 %  Max: 97 %     GENERAL: She is fatigued and debilitated appearing.  Her confusion/psychosis remains improved.  HEENT: Normocephalic, atraumatic.  PERRL. EOMI. No conjunctival injection. No icterus. No labial ulcers  NECK: Supple   HEART: RRR; No murmur  LUNGS: Clear to auscultation bilaterally without wheezing, rales, rhonchi. Normal respiratory effort.  ABDOMEN: Soft, nontender, nondistended.   EXT:  No cyanosis, clubbing or edema. No cord.  :  Without Bui catheter.  MSK: Her right pretibial wound is 3-4 cm in diameter with some residual slough but no cellulitis.  There is decreasedprepatellar swelling secondary to hematoma.  SKIN: Warm and dry without cutaneous eruptions on Inspection/palpation.    NEURO: Alert and responsive.  She is less confused.  She moves all 4 extremities.     Laboratory Data            Results from last 7 days   Lab Units 08/07/22  0933 08/05/22  0752 08/02/22  0701   WBC 10*3/mm3 5.89 6.16 4.72   HEMOGLOBIN g/dL 9.2* 9.3* 9.7*   HEMATOCRIT % 29.0* 28.8* 30.7*   PLATELETS 10*3/mm3 220 200 147           Results from last 7 days   Lab Units 08/07/22  0933   SODIUM mmol/L 141   POTASSIUM mmol/L 4.1   CHLORIDE mmol/L 100   CO2 mmol/L 32.0*   BUN mg/dL 60*   CREATININE mg/dL 1.15*   GLUCOSE mg/dL 171*   CALCIUM mg/dL 9.0                              Estimated Creatinine Clearance: 41.4 mL/min (A) (by C-G formula based on SCr of 1.15 mg/dL (H)).        Microbiology:           Microbiology Results (last 10 days)      Procedure Component Value - Date/Time     Eosinophil Smear - Urine, Urine, Clean Catch [[id number]]  (Normal) Collected: 07/31/22 1503     Lab Status: Final result Specimen: Urine, Clean Catch Updated: 07/31/22 1627       Eosinophil Smear 0 % EOS/100 Cells       Narrative:       No eosinophil seen                      Radiology:  MRI Lumbar Spine  Without Contrast     Result Date: 8/6/2022  1.  Multilevel advanced degenerative changes similar to the more recent imaging.  This report was finalized on 8/6/2022 12:33 PM by Fam Hunt MD.       XR Chest 1 View     Result Date: 8/6/2022  1.  There are findings that could reflect changes from heart failure which have been suggested.  This report was finalized on 8/6/2022 11:16 AM by Fam Hunt MD.       XR Chest 1 View     Result Date: 8/3/2022  Congestive heart failure with moderate bibasilar effusion/atelectasis.  This report was finalized on 8/3/2022 12:17 PM by Dr. Breezy Rey MD.       Duplex Renal Artery - Bilateral Complete CAR     Result Date: 8/1/2022   1. Limited study due to the patient's body habitus and lack of cooperation. 2. Elevated resistive indices which may reflect underlying intrinsic renal disease. 3. No evidence of obstructive uropathy or significant renal artery stenosis.  This report was finalized on 8/1/2022 4:20 PM by Trey Saba MD.          Impression:   1. 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  The fact that she has 2 positive blood cultures for staph epidermis with MRSA also one of the sets suggest that the isolates all skin contaminants.  In addition, follow-up blood cultures have been negative.  She does not have any focal evidence of MRSA infection.  She is now stable off of intravenous antibiotics  2. Right prepatellar hematoma/right knee pain-her routine x-ray was negative for fracture.  MRI scan does not reveal a fracture or tendon rupture.  3. Recent onset of left pretibial ulcer  4. 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 On chronic Augmentin oral suppressive therapy for about a year now.  H/o MSSA.  5. Hypothermia, improved  6. Acute on chronic kidney disease Stage III, ATN vs rhabdomyolysis  7. Rhadomyolysis, after fall and lying on right  side.  8. Right-sided edema/anasarca  9. Elevated LFTs/bilirubin related to above.   10. Chronic BLE edema/RLE lymphedema  11. Type 2 diabetes mellitus, poorly controlled  12. Essential hypertension  13. Nonischemic cardiomyopathy with EF 4-45%  14. Chronic diastolic/systolic mixed CHF  15. Acoustic neuroma  16. Cephelexin, erythromycin, and sulfa intolerances (GI intolerance).    17. Metabolic encephalopathy-Secondary to medication effect and now improved.     PLAN/RECOMMENDATIONS:   1.  Continue off of intravenous antibiotic therapy  2.  Augmentin 875 mg p.o. daily for chronic suppression of right foot infection  3.  Mobilize  4.  Discharge to rehab        Stef Pete MD  8/9/2022  06:46 EDT

## 2022-08-10 NOTE — CASE MANAGEMENT/SOCIAL WORK
Continued Stay Note  [facility]     Patient Name: Susan Anderson  MRN: [id number]  Today's Date: 8/10/2022    Admit Date: 7/26/2022     Discharge Plan     Row Name 08/10/22 1331       Plan    Plan rehab    Plan Comments No current subacute beds at [facility]. I spoke with patient and Katie with referrals given to [facility]. [facility] was in line after that.    Final Discharge Disposition Code 03 - skilled nursing facility (SNF)               Discharge Codes    No documentation.               Expected Discharge Date and Time     Expected Discharge Date Expected Discharge Time    Aug 10, 2022             Sheron Somers RN

## 2022-08-11 LAB
ANION GAP SERPL CALCULATED.3IONS-SCNC: 7 MMOL/L (ref 5–15)
BUN SERPL-MCNC: 47 MG/DL (ref 8–23)
BUN/CREAT SERPL: 42 (ref 7–25)
CALCIUM SPEC-SCNC: 8.3 MG/DL (ref 8.6–10.5)
CHLORIDE SERPL-SCNC: 98 MMOL/L (ref 98–107)
CO2 SERPL-SCNC: 35 MMOL/L (ref 22–29)
CREAT SERPL-MCNC: 1.12 MG/DL (ref 0.57–1)
EGFRCR SERPLBLD CKD-EPI 2021: 48.9 ML/MIN/1.73
GLUCOSE BLDC GLUCOMTR-MCNC: 142 MG/DL (ref 70–130)
GLUCOSE BLDC GLUCOMTR-MCNC: 187 MG/DL (ref 70–130)
GLUCOSE BLDC GLUCOMTR-MCNC: 202 MG/DL (ref 70–130)
GLUCOSE BLDC GLUCOMTR-MCNC: 213 MG/DL (ref 70–130)
GLUCOSE SERPL-MCNC: 194 MG/DL (ref 65–99)
POTASSIUM SERPL-SCNC: 3.9 MMOL/L (ref 3.5–5.2)
SODIUM SERPL-SCNC: 140 MMOL/L (ref 136–145)

## 2022-08-11 PROCEDURE — 63710000001 INSULIN DETEMIR PER 5 UNITS: Performed by: PHYSICIAN ASSISTANT

## 2022-08-11 PROCEDURE — 97535 SELF CARE MNGMENT TRAINING: CPT

## 2022-08-11 PROCEDURE — 63710000001 INSULIN LISPRO (HUMAN) PER 5 UNITS: Performed by: PHYSICIAN ASSISTANT

## 2022-08-11 PROCEDURE — 80048 BASIC METABOLIC PNL TOTAL CA: CPT | Performed by: PHYSICIAN ASSISTANT

## 2022-08-11 PROCEDURE — 97116 GAIT TRAINING THERAPY: CPT

## 2022-08-11 PROCEDURE — 82962 GLUCOSE BLOOD TEST: CPT

## 2022-08-11 PROCEDURE — 25010000002 HEPARIN (PORCINE) PER 1000 UNITS: Performed by: INTERNAL MEDICINE

## 2022-08-11 PROCEDURE — 99232 SBSQ HOSP IP/OBS MODERATE 35: CPT | Performed by: PHYSICIAN ASSISTANT

## 2022-08-11 PROCEDURE — 97530 THERAPEUTIC ACTIVITIES: CPT

## 2022-08-11 RX ORDER — INSULIN LISPRO 100 [IU]/ML
12 INJECTION, SOLUTION INTRAVENOUS; SUBCUTANEOUS
Status: DISCONTINUED | OUTPATIENT
Start: 2022-08-11 | End: 2022-08-12 | Stop reason: HOSPADM

## 2022-08-11 RX ORDER — LABETALOL HYDROCHLORIDE 5 MG/ML
10 INJECTION, SOLUTION INTRAVENOUS ONCE
Status: DISCONTINUED | OUTPATIENT
Start: 2022-08-12 | End: 2022-08-12

## 2022-08-11 RX ADMIN — HYDRALAZINE HYDROCHLORIDE 25 MG: 25 TABLET, FILM COATED ORAL at 05:51

## 2022-08-11 RX ADMIN — PREGABALIN 50 MG: 50 CAPSULE ORAL at 08:47

## 2022-08-11 RX ADMIN — PREGABALIN 50 MG: 50 CAPSULE ORAL at 19:50

## 2022-08-11 RX ADMIN — AMLODIPINE BESYLATE 10 MG: 10 TABLET ORAL at 08:47

## 2022-08-11 RX ADMIN — AMOXICILLIN AND CLAVULANATE POTASSIUM 1 TABLET: 875; 125 TABLET, FILM COATED ORAL at 08:47

## 2022-08-11 RX ADMIN — FERROUS SULFATE TAB 325 MG (65 MG ELEMENTAL FE) 325 MG: 325 (65 FE) TAB at 17:31

## 2022-08-11 RX ADMIN — GUAIFENESIN 1200 MG: 600 TABLET, EXTENDED RELEASE ORAL at 08:47

## 2022-08-11 RX ADMIN — HYDRALAZINE HYDROCHLORIDE 25 MG: 25 TABLET, FILM COATED ORAL at 19:50

## 2022-08-11 RX ADMIN — HYDRALAZINE HYDROCHLORIDE 25 MG: 25 TABLET, FILM COATED ORAL at 14:16

## 2022-08-11 RX ADMIN — INSULIN DETEMIR 28 UNITS: 100 INJECTION, SOLUTION SUBCUTANEOUS at 08:49

## 2022-08-11 RX ADMIN — DICLOFENAC 2 G: 10 GEL TOPICAL at 11:23

## 2022-08-11 RX ADMIN — VITAM B12 100 MCG: 100 TAB at 08:47

## 2022-08-11 RX ADMIN — DICLOFENAC 2 G: 10 GEL TOPICAL at 08:48

## 2022-08-11 RX ADMIN — BUMETANIDE 2 MG: 2 TABLET ORAL at 17:31

## 2022-08-11 RX ADMIN — BUMETANIDE 2 MG: 2 TABLET ORAL at 08:46

## 2022-08-11 RX ADMIN — FERROUS SULFATE TAB 325 MG (65 MG ELEMENTAL FE) 325 MG: 325 (65 FE) TAB at 08:47

## 2022-08-11 RX ADMIN — INSULIN LISPRO 12 UNITS: 100 INJECTION, SOLUTION INTRAVENOUS; SUBCUTANEOUS at 11:23

## 2022-08-11 RX ADMIN — ATORVASTATIN CALCIUM 80 MG: 40 TABLET, FILM COATED ORAL at 19:50

## 2022-08-11 RX ADMIN — INSULIN LISPRO 3 UNITS: 100 INJECTION, SOLUTION INTRAVENOUS; SUBCUTANEOUS at 11:23

## 2022-08-11 RX ADMIN — ISOSORBIDE DINITRATE 20 MG: 20 TABLET ORAL at 08:47

## 2022-08-11 RX ADMIN — INSULIN LISPRO 12 UNITS: 100 INJECTION, SOLUTION INTRAVENOUS; SUBCUTANEOUS at 17:31

## 2022-08-11 RX ADMIN — ISOSORBIDE DINITRATE 20 MG: 20 TABLET ORAL at 19:50

## 2022-08-11 RX ADMIN — ACETAMINOPHEN 650 MG: 325 TABLET, FILM COATED ORAL at 03:20

## 2022-08-11 RX ADMIN — HEPARIN SODIUM 5000 UNITS: 5000 INJECTION INTRAVENOUS; SUBCUTANEOUS at 19:51

## 2022-08-11 RX ADMIN — GUAIFENESIN 1200 MG: 600 TABLET, EXTENDED RELEASE ORAL at 19:50

## 2022-08-11 RX ADMIN — DICLOFENAC 2 G: 10 GEL TOPICAL at 19:58

## 2022-08-11 RX ADMIN — LIDOCAINE 1 PATCH: 50 PATCH CUTANEOUS at 08:47

## 2022-08-11 RX ADMIN — HEPARIN SODIUM 5000 UNITS: 5000 INJECTION INTRAVENOUS; SUBCUTANEOUS at 08:48

## 2022-08-11 RX ADMIN — ASPIRIN 81 MG: 81 TABLET, COATED ORAL at 08:47

## 2022-08-11 RX ADMIN — DIAZEPAM 2 MG: 2 TABLET ORAL at 21:42

## 2022-08-11 RX ADMIN — INSULIN LISPRO 12 UNITS: 100 INJECTION, SOLUTION INTRAVENOUS; SUBCUTANEOUS at 08:48

## 2022-08-11 RX ADMIN — INSULIN LISPRO 3 UNITS: 100 INJECTION, SOLUTION INTRAVENOUS; SUBCUTANEOUS at 08:47

## 2022-08-11 RX ADMIN — FERROUS SULFATE TAB 325 MG (65 MG ELEMENTAL FE) 325 MG: 325 (65 FE) TAB at 11:23

## 2022-08-11 RX ADMIN — ACETAMINOPHEN 650 MG: 325 TABLET, FILM COATED ORAL at 19:49

## 2022-08-11 RX ADMIN — CASTOR OIL AND BALSAM, PERU 1 APPLICATION: 788; 87 OINTMENT TOPICAL at 19:58

## 2022-08-11 RX ADMIN — CYCLOBENZAPRINE 5 MG: 10 TABLET, FILM COATED ORAL at 19:15

## 2022-08-11 RX ADMIN — CASTOR OIL AND BALSAM, PERU 1 APPLICATION: 788; 87 OINTMENT TOPICAL at 08:48

## 2022-08-11 NOTE — PLAN OF CARE
Pt on 1200 ml fluid restriction, pt turned q2hrs, VSS on 1L of O2, NSR, AM labs pending. Dr. Gee, ID, and hospitalist following. CM working on placement.         Goal Outcome Evaluation:

## 2022-08-11 NOTE — PLAN OF CARE
Goal Outcome Evaluation:  Plan of Care Reviewed With: patient, daughter        Progress: improving  Outcome Evaluation: Pt with improved gait distance this session as she was able to ambulate 18' and 16' CGA w/ a RW taking a ~5 minute seated rest break between bouts of walking. Pt gait limited by fear of falling and fatigue. Continue PT POC.

## 2022-08-11 NOTE — THERAPY TREATMENT NOTE
Patient Name: Susan Anderson  : 1940    MRN: [id number]                              Today's Date: 2022       Admit Date: 2022    Visit Dx:     ICD-10-CM ICD-9-CM   1. Weakness  R53.1 780.79   2. Fall, initial encounter  W19.XXXA E888.9   3. Elevated CK  R74.8 790.5   4. Elevated troponin  R77.8 790.6   5. Chronic renal failure, unspecified CKD stage  N18.9 585.9   6. Hyperglycemia  R73.9 790.29     Patient Active Problem List   Diagnosis   • Diabetic foot ulcer ([facility])   • PVD (peripheral vascular disease) ([facility])   • Osteomyelitis ([facility])   • Diabetes mellitus with neuropathy ([facility])   • Essential hypertension   • CKD (chronic kidney disease), stage III ([facility])   • Pyogenic inflammation of bone ([facility])   • Hyperglycemia   • Pneumonia due to infectious organism   • Mitral valve disease   • NICM (nonischemic cardiomyopathy) ([facility])   • Coronary artery disease involving native coronary artery of native heart without angina pectoris   • Dyslipidemia   • Chronic combined systolic and diastolic heart failure ([facility])   • Lumbar stenosis with neurogenic claudication   • Spondylosis of lumbar region without myelopathy or radiculopathy   • Spondylolisthesis, lumbar region   • Degeneration of lumbar or lumbosacral intervertebral disc   • Gait disturbance   • Physical deconditioning   • Sarcopenia   • Weakness   • Anemia   • Fall   • Dizziness   • Acoustic neuroma ([facility])   • Elevated troponin   • CHF exacerbation ([facility])   • Elevated serum creatinine     Past Medical History:   Diagnosis Date   • Arthritis    • Asthma  - double pneumonia    Currently on inhaler and nebulizer   • Cancer ([facility])     cervical cancer, skin cancer   • CHF (congestive heart failure) ([facility]) 2021   • Chronic kidney disease Related to diabetes   • Coronary artery disease 2021 DX for hear failure   • Diabetes mellitus ([facility]) 30 years    Seeing Dr. Lam 1st time Aug 19   • Gout    • Hx of colonoscopy    • Hyperlipidemia Reference current labs  x 2-3yrs approx   • Hypertension 30 years   • Migraine    • Mitral valve disease    • Mitral valve disease    • Osteomyelitis ([facility])    • Peripheral neuropathy    • Sleep apnea    • Type 2 diabetes mellitus ([facility])     30 years     Past Surgical History:   Procedure Laterality Date   • ABDOMINAL HYSTERECTOMY W/SALPINGECTOMY     • AMPUTATION  Right great toe, 1st 1/3 metatarsal -Reg 4/14/21   • AORTAGRAM N/A 4/9/2021    Procedure: AORTAGRAM WITH OR WITHOUT RUNOFFS, WITH Co2;  Surgeon: Trey Forrest MD;  Location: [facility];  Service: Vascular;  Laterality: N/A;   • APPENDECTOMY     • BRAIN TUMOR EXCISION      laser surgery    • CARDIAC CATHETERIZATION N/A 6/21/2021    Procedure: LEFT HEART CATH;  Surgeon: Anjum Ceballos MD;  Location: [facility] CATH INVASIVE LOCATION;  Service: Cardiology;  Laterality: N/A;   • CATARACT EXTRACTION W/ INTRAOCULAR LENS  IMPLANT, BILATERAL     • CHOLECYSTECTOMY     • EYE SURGERY     • HYSTERECTOMY     • TOE SURGERY     • TRANS METATARSAL AMPUTATION Right 4/14/2021    Procedure: AMPUTATION TRANS METATARSAL RIGHT GREAT TOE;  Surgeon: Valdez Finney MD;  Location: [facility] OR;  Service: Vascular;  Laterality: Right;      General Information     Row Name 08/11/22 1130          OT Time and Intention    Document Type therapy note (daily note)  -HK     Mode of Treatment occupational therapy  -     Row Name 08/11/22 1130          General Information    Patient Profile Reviewed yes  -HK     Existing Precautions/Restrictions fall;oxygen therapy device and L/min  -HK     Barriers to Rehab medically complex;previous functional deficit;hearing deficit  hears better in L ear  -HK     Row Name 08/11/22 1130          Cognition    Orientation Status (Cognition) oriented x 3  -HK     Row Name 08/11/22 1130          Safety Issues, Functional Mobility    Safety Issues Affecting Function (Mobility) safety precautions follow-through/compliance;safety precaution awareness;insight into  deficits/self-awareness;awareness of need for assistance;judgment;sequencing abilities  -     Impairments Affecting Function (Mobility) balance;endurance/activity tolerance;pain;postural/trunk control;range of motion (ROM);strength;shortness of breath  -           User Key  (r) = Recorded By, (t) = Taken By, (c) = Cosigned By    Initials Name Provider Type     Padmaja Jenkins, OT Occupational Therapist                 Mobility/ADL's     Row Name 08/11/22 1131          Bed Mobility    Bed Mobility scooting/bridging;supine-sit  -HK     Scooting/Bridging [place] (Bed Mobility) maximum assist (25% patient effort);1 person assist;verbal cues  -     Supine-Sit [place] (Bed Mobility) maximum assist (25% patient effort);1 person assist;verbal cues  -HK     Bed Mobility, Safety Issues decreased use of arms for pushing/pulling;decreased use of legs for bridging/pushing;impaired trunk control for bed mobility  -     Assistive Device (Bed Mobility) bed rails;head of bed elevated;draw sheet  -     Row Name 08/11/22 1131          Transfers    Transfers sit-stand transfer;toilet transfer  -     Bed-Chair [place] (Transfers) minimum assist (75% patient effort);1 person assist;verbal cues  -     Assistive Device (Bed-Chair Transfers) walker, front-wheeled  -HK     Sit-Stand [place] (Transfers) minimum assist (75% patient effort);1 person assist;verbal cues  -HK     [place] Level (Toilet Transfer) minimum assist (75% patient effort);1 person assist;verbal cues  -     Assistive Device (Toilet Transfer) walker, front-wheeled;commode, bedside without drop arms  -     Row Name 08/11/22 1131          Sit-Stand Transfer    Assistive Device (Sit-Stand Transfers) walker, front-wheeled  -HK     Comment, (Sit-Stand Transfer) pt completed sit to stand and stand step transfer from bed to BSC and BSC to chair with minAx1 and RW. Pt requires extenesive time and effort.  -     Row Name 08/11/22 1131           Toilet Transfer    Type (Toilet Transfer) sit-stand;stand-sit  -HK     Comment, (Toilet Transfer) extensive time/effort for completion.  -     Row Name 08/11/22 1131          Functional Mobility    Functional Mobility- Comment not tested  -HK     Row Name 08/11/22 1131          Activities of Daily Living    BADL Assessment/Intervention toileting;grooming  -     Row Name 08/11/22 1131          Grooming Assessment/Training    [place] Level (Grooming) hair care, combing/brushing;wash face, hands;set up  -HK     Position (Grooming) unsupported sitting;edge of bed sitting  -     Row Name 08/11/22 1131          Toileting Assessment/Training    [place] Level (Toileting) adjust/manage clothing;perform perineal hygiene;dependent (less than 25% patient effort)  -HK     Position (Toileting) supported standing  -HK           User Key  (r) = Recorded By, (t) = Taken By, (c) = Cosigned By    Initials Name Provider Type     Padmaja Jenkins, OT Occupational Therapist               Obj/Interventions     Row Name 08/11/22 1135          Sensory Assessment (Somatosensory)    Sensory Assessment (Somatosensory) sensation intact  -     Row Name 08/11/22 1135          Motor Skills    Motor Skills coordination  -     Coordination WFL  -     Row Name 08/11/22 1135          Balance    Balance Assessment sitting static balance;sitting dynamic balance;standing static balance;standing dynamic balance  -     Static Sitting Balance contact guard;verbal cues  -HK     Dynamic Sitting Balance minimal assist;verbal cues  -HK     Position, Sitting Balance unsupported;sitting edge of bed  -HK     Static Standing Balance contact guard  -HK     Dynamic Standing Balance minimal assist  -HK     Position/Device Used, Standing Balance supported;walker, rolling  -HK     Balance Interventions sitting;occupation based/functional task  -HK           User Key  (r) = Recorded By, (t) = Taken By, (c) = Cosigned By    Initials Name  Provider Type     Padmaja Jenkins, OT Occupational Therapist               Goals/Plan    No documentation.                Clinical Impression     Row Name 08/11/22 1138          Pain Assessment    Pretreatment Pain Rating 0/10 - no pain  -HK     Posttreatment Pain Rating 0/10 - no pain  -HK     Row Name 08/11/22 1138          Plan of Care Review    Plan of Care Reviewed With patient  -HK     Progress improving  -     Outcome Evaluation Pt requires maxAx1 for all bed mobility and requires CGA for all static sitting balance at EOB. Pt minAx1 for sit to stand transfers with extensive time/effort for all movement. Pt minAx1 to step to BSC and dependent for all pradip care. Pt combed hair and washed face with setup while sitting EOB. Continue to recommend d/c to IPR as pt requires frequent rest breaks with little exertion due to fatigue.  -     Row Name 08/11/22 1138          Therapy Assessment/Plan (OT)    Patient/Family Therapy Goal Statement (OT) Pt would like to improve and return home.  -     Rehab Potential (OT) good, to achieve stated therapy goals  -     Criteria for Skilled Therapeutic Interventions Met (OT) yes;skilled treatment is necessary  -     Therapy Frequency (OT) daily  -     Row Name 08/11/22 1138          Therapy Plan Review/Discharge Plan (OT)    Anticipated Discharge Disposition (OT) skilled nursing facility  -     Row Name 08/11/22 1138          Vital Signs    Pre Systolic BP Rehab --  RN cleared for tx; VSS  -HK     O2 Delivery Pre Treatment supplemental O2  -HK     Intra SpO2 (%) 94  -HK     O2 Delivery Intra Treatment room air  -HK     Post SpO2 (%) 93  -HK     O2 Delivery Post Treatment room air  -HK     Pre Patient Position Supine  -HK     Intra Patient Position Standing  -HK     Post Patient Position Sitting  -HK     Row Name 08/11/22 1138          Positioning and Restraints    Pre-Treatment Position in bed  -HK     Post Treatment Position chair  -HK     In Chair notified  nsg;reclined;call light within reach;exit alarm on;encouraged to call for assist;with family/caregiver;on mechanical lift sling;waffle cushion  -HK           User Key  (r) = Recorded By, (t) = Taken By, (c) = Cosigned By    Initials Name Provider Type    Padmaja Sanches OT Occupational Therapist               Outcome Measures     Row Name 08/11/22 1140          How much help from another is currently needed...    Putting on and taking off regular lower body clothing? 1  -HK     Bathing (including washing, rinsing, and drying) 1  -HK     Toileting (which includes using toilet bed pan or urinal) 1  -HK     Putting on and taking off regular upper body clothing 2  -HK     Taking care of personal grooming (such as brushing teeth) 2  -HK     Eating meals 3  -HK     AM-PAC 6 Clicks Score (OT) 10  -HK     Row Name 08/11/22 0847          How much help from another person do you currently need...    Turning from your back to your side while in flat bed without using bedrails? 2  -GJ     Moving from lying on back to sitting on the side of a flat bed without bedrails? 2  -GJ     Moving to and from a bed to a chair (including a wheelchair)? 2  -GJ     Standing up from a chair using your arms (e.g., wheelchair, bedside chair)? 3  -GJ     Climbing 3-5 steps with a railing? 1  -GJ     To walk in hospital room? 2  -GJ     AM-PAC 6 Clicks Score (PT) 12  -GJ     Highest level of mobility 4 --> Transferred to chair/commode  -GJ     Row Name 08/11/22 1140          Functional Assessment    Outcome Measure Options AM-PAC 6 Clicks Daily Activity (OT)  -           User Key  (r) = Recorded By, (t) = Taken By, (c) = Cosigned By    Initials Name Provider Type    Padmaja Sanches OT Occupational Therapist    Chelo Parsons RN Registered Nurse                Occupational Therapy Education                 Title: PT OT SLP Therapies (Done)     Topic: Occupational Therapy (Done)     Point: ADL training (Done)     Description:   Instruct  learner(s) on proper safety adaptation and remediation techniques during self care or transfers.   Instruct in proper use of assistive devices.              Learning Progress Summary           Patient Acceptance, E,TB,D, VU,NR by  at 8/11/2022 1141   Other Eager, E,TB,D, VU,NR by AR at 8/8/2022 1701      Show all documentation for this point (4)                 Point: Home exercise program (Done)     Description:   Instruct learner(s) on appropriate technique for monitoring, assisting and/or progressing therapeutic exercises/activities.              Learning Progress Summary           Patient Eager, E,TB,D, VU,NR by AR at 8/8/2022 1701   Other Eager, E,TB,D, VU,NR by AR at 8/8/2022 1701      Show all documentation for this point (1)                 Point: Precautions (Done)     Description:   Instruct learner(s) on prescribed precautions during self-care and functional transfers.              Learning Progress Summary           Patient Acceptance, E,TB,D, VU,NR by  at 8/11/2022 1141   Other Eager, E,TB,D, VU,NR by AR at 8/8/2022 1701      Show all documentation for this point (4)                 Point: Body mechanics (Done)     Description:   Instruct learner(s) on proper positioning and spine alignment during self-care, functional mobility activities and/or exercises.              Learning Progress Summary           Patient Acceptance, E,TB,D, VU,NR by  at 8/11/2022 1141   Other Eager, E,TB,D, VU,NR by AR at 8/8/2022 1701      Show all documentation for this point (4)                             User Key     Initials Effective Dates Name Provider Type Discipline    AR 06/16/21 -  Lexy Bryan, OT Occupational Therapist OT     06/16/21 -  Padmaja Jenkins OT Occupational Therapist OT              OT Recommendation and Plan  Therapy Frequency (OT): daily  Plan of Care Review  Plan of Care Reviewed With: patient  Progress: improving  Outcome Evaluation: Pt requires maxAx1 for all bed mobility and requires  CGA for all static sitting balance at EOB. Pt minAx1 for sit to stand transfers with extensive time/effort for all movement. Pt minAx1 to step to BSC and dependent for all pradip care. Pt combed hair and washed face with setup while sitting EOB. Continue to recommend d/c to IPR as pt requires frequent rest breaks with little exertion due to fatigue.     Time Calculation:    Time Calculation- OT     Row Name 08/11/22 1101             Time Calculation- OT    OT Start Time 1101  -HK      OT Received On 08/11/22  -HK              Timed Charges    76643 - OT Self Care/Mgmt Minutes 39  -HK              Total Minutes    Timed Charges Total Minutes 39  -HK       Total Minutes 39  -HK            User Key  (r) = Recorded By, (t) = Taken By, (c) = Cosigned By    Initials Name Provider Type    HK Padmaja Jenkins OT Occupational Therapist              Therapy Charges for Today     Code Description Service Date Service Provider Modifiers Qty    25306038855 HC OT SELF CARE/MGMT/TRAIN EA 15 MIN 8/11/2022 Padmaja Jenkins OT GO 3               Padmaja Jenkins OT  8/11/2022

## 2022-08-11 NOTE — PLAN OF CARE
Problem: Fall Injury Risk  Goal: Absence of Fall and Fall-Related Injury  Outcome: Ongoing, Progressing  Intervention: Identify and Manage Contributors  Recent Flowsheet Documentation  Taken 8/11/2022 1600 by Chelo Swain RN  Medication Review/Management: medications reviewed  Taken 8/11/2022 1400 by Chelo Swain RN  Medication Review/Management: medications reviewed  Taken 8/11/2022 1200 by Chelo Swain RN  Medication Review/Management: medications reviewed  Taken 8/11/2022 1000 by Chelo Swain RN  Medication Review/Management: medications reviewed  Taken 8/11/2022 0847 by Chelo Swain RN  Medication Review/Management: medications reviewed  Intervention: Promote Injury-Free Environment  Recent Flowsheet Documentation  Taken 8/11/2022 1600 by Chelo Swain RN  Safety Promotion/Fall Prevention:   activity supervised   assistive device/personal items within reach   clutter free environment maintained   room organization consistent   safety round/check completed   toileting scheduled  Taken 8/11/2022 1400 by Chelo Swain RN  Safety Promotion/Fall Prevention:   activity supervised   assistive device/personal items within reach   clutter free environment maintained   room organization consistent   safety round/check completed   toileting scheduled  Taken 8/11/2022 1200 by Chelo Swain RN  Safety Promotion/Fall Prevention:   activity supervised   assistive device/personal items within reach   clutter free environment maintained   room organization consistent   safety round/check completed   toileting scheduled  Taken 8/11/2022 1000 by Chelo Swain RN  Safety Promotion/Fall Prevention:   activity supervised   assistive device/personal items within reach   clutter free environment maintained   room organization consistent   safety round/check completed   toileting scheduled  Taken 8/11/2022 0847 by Chelo Swain RN  Safety Promotion/Fall Prevention:   activity supervised    assistive device/personal items within reach   clutter free environment maintained   room organization consistent   toileting scheduled   safety round/check completed     Problem: Skin Injury Risk Increased  Goal: Skin Health and Integrity  Outcome: Ongoing, Progressing  Intervention: Optimize Skin Protection  Recent Flowsheet Documentation  Taken 8/11/2022 1600 by Chelo Swain RN  Pressure Reduction Techniques:   frequent weight shift encouraged   weight shift assistance provided  Head of Bed (HOB) Positioning: HOB at 30-45 degrees  Pressure Reduction Devices: specialty bed utilized  Skin Protection:   adhesive use limited   incontinence pads utilized   tubing/devices free from skin contact  Taken 8/11/2022 1400 by Chelo Swain RN  Pressure Reduction Techniques:   frequent weight shift encouraged   weight shift assistance provided  Pressure Reduction Devices: chair cushion utilized  Skin Protection:   adhesive use limited   incontinence pads utilized   tubing/devices free from skin contact  Taken 8/11/2022 1200 by Chelo Swain RN  Pressure Reduction Techniques:   frequent weight shift encouraged   weight shift assistance provided  Pressure Reduction Devices: chair cushion utilized  Skin Protection:   adhesive use limited   incontinence pads utilized   tubing/devices free from skin contact  Taken 8/11/2022 1000 by Chelo Swain RN  Pressure Reduction Techniques:   frequent weight shift encouraged   weight shift assistance provided  Head of Bed (HOB) Positioning: HOB at 30-45 degrees  Pressure Reduction Devices: specialty bed utilized  Skin Protection:   adhesive use limited   incontinence pads utilized   tubing/devices free from skin contact  Taken 8/11/2022 0847 by Chelo Swain RN  Pressure Reduction Techniques:   frequent weight shift encouraged   weight shift assistance provided  Head of Bed (HOB) Positioning: HOB at 30-45 degrees  Pressure Reduction Devices: specialty bed utilized  Skin  Protection:   adhesive use limited   incontinence pads utilized   tubing/devices free from skin contact     Problem: Adult Inpatient Plan of Care  Goal: Plan of Care Review  Outcome: Ongoing, Progressing  Flowsheets (Taken 8/11/2022 1742)  Progress: improving  Plan of Care Reviewed With: patient  Goal: Patient-Specific Goal (Individualized)  Outcome: Ongoing, Progressing  Goal: Absence of Hospital-Acquired Illness or Injury  Outcome: Ongoing, Progressing  Intervention: Identify and Manage Fall Risk  Recent Flowsheet Documentation  Taken 8/11/2022 1600 by Chelo Swain RN  Safety Promotion/Fall Prevention:   activity supervised   assistive device/personal items within reach   clutter free environment maintained   room organization consistent   safety round/check completed   toileting scheduled  Taken 8/11/2022 1400 by Chelo Swain RN  Safety Promotion/Fall Prevention:   activity supervised   assistive device/personal items within reach   clutter free environment maintained   room organization consistent   safety round/check completed   toileting scheduled  Taken 8/11/2022 1200 by Chelo Swain RN  Safety Promotion/Fall Prevention:   activity supervised   assistive device/personal items within reach   clutter free environment maintained   room organization consistent   safety round/check completed   toileting scheduled  Taken 8/11/2022 1000 by Chelo Swain RN  Safety Promotion/Fall Prevention:   activity supervised   assistive device/personal items within reach   clutter free environment maintained   room organization consistent   safety round/check completed   toileting scheduled  Taken 8/11/2022 0847 by Chelo Swain RN  Safety Promotion/Fall Prevention:   activity supervised   assistive device/personal items within reach   clutter free environment maintained   room organization consistent   toileting scheduled   safety round/check completed  Intervention: Prevent Skin Injury  Recent Flowsheet  Documentation  Taken 8/11/2022 1600 by Chelo Swain RN  Body Position: sitting up in bed  Skin Protection:   adhesive use limited   incontinence pads utilized   tubing/devices free from skin contact  Taken 8/11/2022 1400 by Chelo Swain RN  Body Position: legs elevated  Skin Protection:   adhesive use limited   incontinence pads utilized   tubing/devices free from skin contact  Taken 8/11/2022 1200 by Chelo Swain RN  Body Position: legs elevated  Skin Protection:   adhesive use limited   incontinence pads utilized   tubing/devices free from skin contact  Taken 8/11/2022 1000 by Chelo Swain RN  Body Position:   turned   left  Skin Protection:   adhesive use limited   incontinence pads utilized   tubing/devices free from skin contact  Taken 8/11/2022 0847 by Chelo Swain RN  Body Position: sitting up in bed  Skin Protection:   adhesive use limited   incontinence pads utilized   tubing/devices free from skin contact  Intervention: Prevent and Manage VTE (Venous Thromboembolism) Risk  Recent Flowsheet Documentation  Taken 8/11/2022 1600 by Chelo Swain RN  Activity Management:   activity adjusted per tolerance   activity encouraged  Taken 8/11/2022 1400 by Chelo Swain RN  Activity Management: up in chair  Taken 8/11/2022 1200 by Chelo Swain RN  Activity Management: up in chair  Taken 8/11/2022 1000 by Chelo Swain RN  Activity Management:   activity adjusted per tolerance   activity encouraged  Taken 8/11/2022 0847 by Chelo Swain RN  Activity Management:   activity adjusted per tolerance   activity encouraged  VTE Prevention/Management: (heparin) other (see comments)  Intervention: Prevent Infection  Recent Flowsheet Documentation  Taken 8/11/2022 1600 by Chelo Swain RN  Infection Prevention:   environmental surveillance performed   rest/sleep promoted  Taken 8/11/2022 1400 by Chelo Swain RN  Infection Prevention:   environmental surveillance performed    rest/sleep promoted  Taken 8/11/2022 1200 by Chelo Swain, RN  Infection Prevention:   environmental surveillance performed   rest/sleep promoted  Taken 8/11/2022 1000 by Chelo Swain RN  Infection Prevention:   environmental surveillance performed   rest/sleep promoted  Taken 8/11/2022 0847 by Chelo Swain, RN  Infection Prevention:   environmental surveillance performed   rest/sleep promoted  Goal: Optimal Comfort and Wellbeing  Outcome: Ongoing, Progressing  Goal: Readiness for Transition of Care  Outcome: Ongoing, Progressing   Goal Outcome Evaluation:  Plan of Care Reviewed With: patient        Progress: improving     Pt alert and oriented x4. VSS. RA to 1L O2 per NC. Up with PT and OT today. Plan to go to [facility] tomorrow, time TBD.

## 2022-08-11 NOTE — PLAN OF CARE
Goal Outcome Evaluation:  Plan of Care Reviewed With: patient        Progress: improving  Outcome Evaluation: Pt requires maxAx1 for all bed mobility and requires CGA for all static sitting balance at EOB. Pt minAx1 for sit to stand transfers with extensive time/effort for all movement. Pt minAx1 to step to BSC and dependent for all pradip care. Pt combed hair and washed face with setup while sitting EOB. Continue to recommend d/c to IPR as pt requires frequent rest breaks with little exertion due to fatigue.

## 2022-08-11 NOTE — PROGRESS NOTES
[facility] Medicine Services  PROGRESS NOTE    Patient Name: Susan Anderson  : 1940  MRN: [id number]    Date of Admission: 2022  Primary Care Physician: Arleen Doherty MD    Subjective     CC: f/u weakness, constipation     HPI:  Patient sitting up in chair, has just worked with OT. She states she feels good after working with PT and OT, but she is tired during the day because she is not sleeping well at night. Patient does not wish to trial any other sleep aids after having a bad experience with melatonin. She wishes to get well so she can sit on her porch with her dog in the sun.    ROS:  Gen- No fevers, chills  CV- No chest pain, palpitations  Resp- No cough, dyspnea   GI- No N/V/D, abd pain    Objective     Vital Signs:   Temp:  [97.6 °F (36.4 °C)-98.3 °F (36.8 °C)] 97.6 °F (36.4 °C)  Heart Rate:  [70-85] 73  Resp:  [15-20] 18  BP: (150-223)/() 167/72  Flow (L/min):  [1] 1     Physical Exam:  Constitutional: Chronically-ill woman in no acute distress, sitting in the chair conversing appropriately  HENT: NCAT, mucous membranes moist   Respiratory: Fine crackles appreciated in bilateral lower lobes. Maintains 90-94% O2 saturation on room air. Normal respiratory effort   Cardiovascular: RRR, no murmurs, rubs, or gallops  Gastrointestinal: Positive bowel sounds, firm, nontender  Musculoskeletal: 2+ pitting BLE edema, trace-1+ BUE edema with R > L, right knee pain stable since last exam on 8/10  Psychiatric: Cooperative, speech clear and spontaneous   Neurologic: Oriented x 3, strength symmetric in all extremities, generalized weakness, Cranial Nerves grossly intact with no focal deficits  Skin: No rashes, diffuse ecchymosis bilateral arms, BLE scaly    Results Reviewed:  LAB RESULTS:      Lab 22  0933 22  0752   WBC 5.89 6.16   HEMOGLOBIN 9.2* 9.3*   HEMATOCRIT 29.0* 28.8*   PLATELETS 220 200   NEUTROS ABS 4.05  --    IMMATURE GRANS (ABS) 0.08*  --     LYMPHS ABS 0.86  --    MONOS ABS 0.78  --    EOS ABS 0.09  --    MCV 92.4 92.6         Lab 08/11/22  0449 08/10/22  0641 08/07/22  0933 08/06/22  1418 08/05/22  0752   SODIUM 140 144 141 143 144   POTASSIUM 3.9 4.2 4.1 4.5 4.7   CHLORIDE 98 102 100 102 102   CO2 35.0* 37.0* 32.0* 29.0 31.0*   ANION GAP 7.0 5.0 9.0 12.0 11.0   BUN 47* 51* 60* 62* 65*   CREATININE 1.12* 1.05* 1.15* 1.24* 1.51*   EGFR 48.9* 52.8* 47.4* 43.3* 34.2*   GLUCOSE 194* 119* 171* 197* 106*   CALCIUM 8.3* 8.4* 9.0 9.2 8.9     Brief Urine Lab Results  (Last result in the past 365 days)      Color   Clarity   Blood   Leuk Est   Nitrite   Protein   CREAT   Urine HCG        07/31/22 1503             27.0             Microbiology Results Abnormal     Procedure Component Value - Date/Time    Blood Culture - Blood, Arm, Right [[id number]]  (Normal) Collected: 07/29/22 1456    Lab Status: Final result Specimen: Blood from Arm, Right Updated: 08/03/22 1517     Blood Culture No growth at 5 days    Blood Culture - Blood, Hand, Left [[id number]]  (Normal) Collected: 07/29/22 1456    Lab Status: Final result Specimen: Blood from Hand, Left Updated: 08/03/22 1517     Blood Culture No growth at 5 days    Body Fluid Culture - Body Fluid, Knee, Right [[id number]] Collected: 07/29/22 1400    Lab Status: Final result Specimen: Body Fluid from Knee, Right Updated: 08/03/22 1009     Body Fluid Culture No growth at 5 days     Gram Stain Rare (1+) WBCs seen      No organisms seen    Eosinophil Smear - Urine, Urine, Clean Catch [[id number]]  (Normal) Collected: 07/31/22 1503    Lab Status: Final result Specimen: Urine, Clean Catch Updated: 07/31/22 1627     Eosinophil Smear 0 % EOS/100 Cells     Narrative:      No eosinophil seen    COVID PRE-OP / PRE-PROCEDURE SCREENING ORDER (NO ISOLATION) - Swab, Nasopharynx [[id number]]  (Normal) Collected: 07/26/22 2300    Lab Status: Final result Specimen: Swab from Nasopharynx Updated: 07/26/22 2340    Narrative:      The  following orders were created for panel order COVID PRE-OP / PRE-PROCEDURE SCREENING ORDER (NO ISOLATION) - Swab, Nasopharynx.  Procedure                               Abnormality         Status                     ---------                               -----------         ------                     COVID-19 and FLU A/B PCR...[[id number]]  Normal              Final result                 Please view results for these tests on the individual orders.    COVID-19 and FLU A/B PCR - Swab, Nasopharynx [[id number]]  (Normal) Collected: 07/26/22 2300    Lab Status: Final result Specimen: Swab from Nasopharynx Updated: 07/26/22 2340     COVID19 Not Detected     Influenza A PCR Not Detected     Influenza B PCR Not Detected    Narrative:      Fact sheet for providers: https://www.fda.gov/media/975012/download    Fact sheet for patients: https://www.fda.gov/media/528382/download    Test performed by PCR.        No radiology results from the last 24 hrs    Results for orders placed during the hospital encounter of 07/26/22    Adult Transthoracic Echo Complete W/ Cont if Necessary Per Protocol    Interpretation Summary  · Normal left ventricular systolic function, estimated EF 55%.  · Aortic sclerosis without aortic stenosis or regurgitation.  · Mild mitral regurgitation.    I have reviewed the medications:  Scheduled Meds:allopurinol, 300 mg, Oral, Daily  amLODIPine, 10 mg, Oral, Daily  amoxicillin-clavulanate, 1 tablet, Oral, Daily  aspirin, 81 mg, Oral, Daily  atorvastatin, 80 mg, Oral, Nightly  bumetanide, 2 mg, Oral, BID  castor oil-balsam peru, 1 application, Topical, Q12H  Diclofenac Sodium, 2 g, Topical, 4x Daily  ferrous sulfate, 325 mg, Oral, TID With Meals  guaiFENesin, 1,200 mg, Oral, Q12H  heparin (porcine), 5,000 Units, Subcutaneous, Q12H  hydrALAZINE, 25 mg, Oral, Q8H  insulin detemir, 28 Units, Subcutaneous, Daily  insulin lispro, 0-7 Units, Subcutaneous, TID AC  Insulin Lispro, 12 Units, Subcutaneous, TID With  Meals  isosorbide dinitrate, 20 mg, Oral, BID  lidocaine, 1 patch, Transdermal, Q24H  polyethylene glycol, 17 g, Oral, BID  pregabalin, 50 mg, Oral, Q12H  senna-docusate sodium, 1 tablet, Oral, BID  sodium chloride, 10 mL, Intravenous, Q12H  vitamin B-12, 100 mcg, Oral, Daily      Continuous Infusions:   PRN Meds:.•  acetaminophen **OR** [DISCONTINUED] acetaminophen **OR** [DISCONTINUED] acetaminophen  •  bisacodyl  •  bisacodyl  •  cyclobenzaprine  •  dextrose  •  dextrose  •  diazePAM  •  glucagon (human recombinant)  •  hydrALAZINE  •  ipratropium-albuterol  •  magnesium hydroxide  •  ondansetron **OR** ondansetron  •  Polyvinyl Alcohol-Povidone PF  •  [COMPLETED] Insert peripheral IV **AND** sodium chloride  •  sodium chloride  •  sodium chloride    Assessment & Plan     Active Hospital Problems    Diagnosis  POA   • **Weakness [R53.1]  Yes   • Anemia [D64.9]  Unknown   • Fall [W19.XXXA]  Unknown   • Dizziness [R42]  Unknown   • Acoustic neuroma (HCC) [D33.3]  Unknown   • Elevated troponin [R77.8]  Unknown   • CHF exacerbation ([facility]) [I50.9]  Unknown   • Elevated serum creatinine [R79.89]  Unknown   • NICM (nonischemic cardiomyopathy) ([facility]) [I42.8]  Yes   • Coronary artery disease involving native coronary artery of native heart without angina pectoris [I25.10]  Yes   • Dyslipidemia [E78.5]  Yes   • Chronic combined systolic and diastolic heart failure (HCC) [I50.42]  Yes   • Mitral valve disease [I05.9]  Yes   • PVD (peripheral vascular disease) ([facility]) [I73.9]  Yes   • Essential hypertension [I10]  Yes   • CKD (chronic kidney disease), stage III ([facility]) [N18.30]  Yes      Resolved Hospital Problems   No resolved problems to display.     Brief Hospital Course to date:  Susan Anderson is a 82 y.o. female with PMH significant for HTN, HLD, CAD, non-ischemic cardiomyopathy, chronic combined systolic/diastolic CHF, CKD III, PVD, insulin-dependent DMII, ELIUD (on 2L NC QHS, chronic BLE edema, and acoustic neuroma. She was  admitted to [facility] 7/26/22 for mild rhabdomyolysis after a fall at home. Also found to have a/c CHF exacerbation and LACEY.      Fall at home  Generalized weakness  - Fall at home in bathroom when she opted not to wait for her bath aide to help her  - Cardiology has evaluated and feel her falls are likely mechanical and not cardiac. May have some orthostasis. Patient does report she feels better with SBP in 150's, so allowing some permissive hypertension   - PT/OT following - case management working on rehab placement     Rhabdomyolysis, resolved   -  on admission, s/p IV fluids      Acute on chronic combined systolic/diastolic CHF  Nonischemic cardiomyopathy   LACEY on CKD III - baseline 1.3-1.5  - Appreciate nephrology assistance with diuresis   - On Bumex 1mg daily 7/28-8/3, then increased to 1mg BID 8/4-8/6, then increased to 2mg BID 8/6-8/8  - Worsening edema after Bumex decreased to 1mg BID on 8/9 - discussed with nephrology increased back to 2mg BID 8/10  - 8/1 renal artery duplex suggestive of intrinsic disease but no significant renal artery stenosis or obstructive uropathy   - Creatinine 1.9 on admission, has since returned to baseline  - CXR 8/3 showed congestive heart failure with moderate bibasilar effusion/atelectasis  - s/p IV albumin   - Fluid restriction per nephrology, strict I&O   - OK for discharge from Nephrology standpoint, per note. Nephrology has signed off      Blood culture (+) MRSA, suspected contaminant   - 7/26 - 2 of 2 blood cultures (+) staph epidermis, 1 of 2 positive for MRSA  - Repeat blood cultures on 7/31 NGTD  - Appreciate ID assistance. No source of infection has been identified. Suspect contaminant. IV antibiotics stopped     Right knee effusion   - Orthopedic surgery has evaluated  - Joint aspiration not consistent with infection. Culture NGTD  - MRI of R knee does not suggest structural injury to the knee  - WBAT. Knee immobilizer on for comfort  - Follow  up with Dr. Pearce in 2-3 weeks      Low back / leg spasms   - PRN Robaxin ineffective per patient  - Discontinued Baclofen due to mental status changes  - Continue Flexeril 5mg TID PRN - seems to be tolerating   - Continue Tylenol PRN  - Valium 4mg QHS PRN only      Chronic anemia   - PO iron supplement      Insulin-dependent DMII   Diabetic peripheral neuropathy  - Hgb A1c 9.0%  - Eating better, having issues with hyperglycemia now   - Increase Levemir to 28 units daily and increase Lispro to 12 units TID AC + SSI  - Continue Lyrica 50mg BID      History of R great toe osteomyelitis  - s/p TMA 4/2021 by Dr. Finney.   - Culture (+) MSSA  - Has been on chronic suppressive Augmentin for ~1 year  - Back on Augmentin 875mg PO daily per ID      Dizziness  Acoustic neuroma, chronic  - ? contributing to falls  - Had initial w/u for neuroma at [facility]  - Has not had recent follow up imaging, consider MRI with/without contrast if GFR continues to improve prior to DC     Essential hypertension  - Beta blocker stopped due to bradycardia  - Restarted Lipitor now that Daptomycin has been stopped  - Episode of hypertension evening of 8/10, SBP >200   - Increase Amlodipine to 10mg daily and add Terazosin 2mg qHS  - Bumex 2mg BID, Hydralazine 25mg TID, Isordil 20mg BID       Constipation  - Continue Senna, decrease Miralax to every other day  -  8/11    Expected Discharge Location and Transportation: rehab pending acceptance via EMS or medical transport van   Expected Discharge Date: 8/12    DVT prophylaxis:Medical DVT prophylaxis orders are present.     AM-PAC 6 Clicks Score (PT): 12 (08/11/22 0844)    CODE STATUS:   Code Status and Medical Interventions:   Ordered at: 07/27/22 0143     Code Status (Patient has no pulse and is not breathing):    CPR (Attempt to Resuscitate)     Medical Interventions (Patient has pulse or is breathing):    Full Support     Anna Crystal PA-C  08/11/22

## 2022-08-11 NOTE — CASE MANAGEMENT/SOCIAL WORK
Continued Stay Note  Psychiatric     Patient Name: Susan Anderson  MRN: [id number]  Today's Date: 8/11/2022    Admit Date: 7/26/2022     Discharge Plan     Row Name 08/11/22 1626       Plan    Plan [facility]    Plan Comments I just received word of insurance approval for [facility]. I am working on transportation and will not know anything until tomorrow and will discuss update with patient and Katie tomorrow. There currently are no skilled beds available at [facility] and do not anticipate any tomorrow.    Final Discharge Disposition Code 03 - skilled nursing facility (SNF)               Discharge Codes    No documentation.               Expected Discharge Date and Time     Expected Discharge Date Expected Discharge Time    Aug 13, 2022             Sheron Somers RN

## 2022-08-12 VITALS
DIASTOLIC BLOOD PRESSURE: 73 MMHG | BODY MASS INDEX: 37.3 KG/M2 | HEIGHT: 63 IN | SYSTOLIC BLOOD PRESSURE: 157 MMHG | WEIGHT: 210.5 LBS | OXYGEN SATURATION: 92 % | RESPIRATION RATE: 18 BRPM | TEMPERATURE: 98 F | HEART RATE: 82 BPM

## 2022-08-12 PROBLEM — N18.30 ACUTE RENAL FAILURE SUPERIMPOSED ON STAGE 3 CHRONIC KIDNEY DISEASE: Status: RESOLVED | Noted: 2022-08-12 | Resolved: 2022-08-12

## 2022-08-12 PROBLEM — M25.561 RIGHT KNEE PAIN: Status: ACTIVE | Noted: 2022-08-12

## 2022-08-12 PROBLEM — I50.43 ACUTE ON CHRONIC COMBINED SYSTOLIC AND DIASTOLIC CONGESTIVE HEART FAILURE: Status: ACTIVE | Noted: 2022-07-27

## 2022-08-12 PROBLEM — M25.461 EFFUSION OF RIGHT KNEE: Status: ACTIVE | Noted: 2022-08-12

## 2022-08-12 PROBLEM — R79.89 ELEVATED TROPONIN: Status: RESOLVED | Noted: 2022-07-27 | Resolved: 2022-08-12

## 2022-08-12 PROBLEM — N18.30 ACUTE RENAL FAILURE SUPERIMPOSED ON STAGE 3 CHRONIC KIDNEY DISEASE: Status: ACTIVE | Noted: 2022-08-12

## 2022-08-12 PROBLEM — N17.9 ACUTE RENAL FAILURE SUPERIMPOSED ON STAGE 3 CHRONIC KIDNEY DISEASE: Status: RESOLVED | Noted: 2022-08-12 | Resolved: 2022-08-12

## 2022-08-12 PROBLEM — N17.9 ACUTE RENAL FAILURE SUPERIMPOSED ON STAGE 3 CHRONIC KIDNEY DISEASE: Status: ACTIVE | Noted: 2022-08-12

## 2022-08-12 PROBLEM — R77.8 ELEVATED TROPONIN: Status: RESOLVED | Noted: 2022-07-27 | Resolved: 2022-08-12

## 2022-08-12 LAB
GLUCOSE BLDC GLUCOMTR-MCNC: 165 MG/DL (ref 70–130)
SARS-COV-2 RDRP RESP QL NAA+PROBE: NORMAL

## 2022-08-12 PROCEDURE — 63710000001 INSULIN DETEMIR PER 5 UNITS: Performed by: PHYSICIAN ASSISTANT

## 2022-08-12 PROCEDURE — 25010000002 HEPARIN (PORCINE) PER 1000 UNITS: Performed by: INTERNAL MEDICINE

## 2022-08-12 PROCEDURE — 63710000001 INSULIN LISPRO (HUMAN) PER 5 UNITS: Performed by: PHYSICIAN ASSISTANT

## 2022-08-12 PROCEDURE — 82962 GLUCOSE BLOOD TEST: CPT

## 2022-08-12 PROCEDURE — 87635 SARS-COV-2 COVID-19 AMP PRB: CPT | Performed by: PHYSICIAN ASSISTANT

## 2022-08-12 PROCEDURE — 99239 HOSP IP/OBS DSCHRG MGMT >30: CPT | Performed by: PHYSICIAN ASSISTANT

## 2022-08-12 RX ORDER — BISACODYL 10 MG
10 SUPPOSITORY, RECTAL RECTAL DAILY PRN
Start: 2022-08-12 | End: 2022-10-06

## 2022-08-12 RX ORDER — AMOXICILLIN 250 MG
1 CAPSULE ORAL NIGHTLY
Status: ON HOLD
Start: 2022-08-13 | End: 2023-02-02 | Stop reason: SDUPTHER

## 2022-08-12 RX ORDER — CYCLOBENZAPRINE HCL 5 MG
5 TABLET ORAL 3 TIMES DAILY PRN
Start: 2022-08-12 | End: 2022-09-03 | Stop reason: HOSPADM

## 2022-08-12 RX ORDER — HYDRALAZINE HYDROCHLORIDE 25 MG/1
25 TABLET, FILM COATED ORAL EVERY 8 HOURS SCHEDULED
Status: ON HOLD
Start: 2022-08-12 | End: 2022-09-30 | Stop reason: SDUPTHER

## 2022-08-12 RX ORDER — PREGABALIN 100 MG/1
100 CAPSULE ORAL 2 TIMES DAILY
Qty: 10 CAPSULE | Refills: 0 | Status: SHIPPED | OUTPATIENT
Start: 2022-08-12 | End: 2022-09-03 | Stop reason: HOSPADM

## 2022-08-12 RX ORDER — BUMETANIDE 1 MG/1
1.5 TABLET ORAL 2 TIMES DAILY
Start: 2022-08-12 | End: 2022-09-03 | Stop reason: HOSPADM

## 2022-08-12 RX ORDER — AMLODIPINE BESYLATE 5 MG/1
10 TABLET ORAL DAILY
Qty: 30 TABLET | Refills: 11
Start: 2022-08-12 | End: 2022-09-03 | Stop reason: HOSPADM

## 2022-08-12 RX ORDER — BISACODYL 5 MG/1
10 TABLET, DELAYED RELEASE ORAL DAILY PRN
Start: 2022-08-12 | End: 2022-09-03 | Stop reason: HOSPADM

## 2022-08-12 RX ORDER — ATORVASTATIN CALCIUM 40 MG/1
40 TABLET, FILM COATED ORAL DAILY
Status: ON HOLD
Start: 2022-08-12 | End: 2022-09-30 | Stop reason: SDUPTHER

## 2022-08-12 RX ORDER — GUAIFENESIN 600 MG/1
1200 TABLET, EXTENDED RELEASE ORAL 2 TIMES DAILY
Status: ON HOLD
Start: 2022-08-12 | End: 2023-02-02 | Stop reason: SDUPTHER

## 2022-08-12 RX ORDER — ACETAMINOPHEN 325 MG/1
650 TABLET ORAL EVERY 4 HOURS PRN
Start: 2022-08-12 | End: 2022-09-30 | Stop reason: HOSPADM

## 2022-08-12 RX ORDER — INSULIN GLARGINE 100 [IU]/ML
30 INJECTION, SOLUTION SUBCUTANEOUS NIGHTLY
Qty: 10 ML | Refills: 5 | Status: ON HOLD | OUTPATIENT
Start: 2022-08-12 | End: 2022-09-30 | Stop reason: SDUPTHER

## 2022-08-12 RX ORDER — AMOXICILLIN 250 MG
1 CAPSULE ORAL NIGHTLY
Status: DISCONTINUED | OUTPATIENT
Start: 2022-08-13 | End: 2022-08-12 | Stop reason: HOSPADM

## 2022-08-12 RX ORDER — POLYETHYLENE GLYCOL 3350 17 G/17G
17 POWDER, FOR SOLUTION ORAL DAILY
Status: DISCONTINUED | OUTPATIENT
Start: 2022-08-13 | End: 2022-08-12 | Stop reason: HOSPADM

## 2022-08-12 RX ORDER — CASTOR OIL AND BALSAM, PERU 788; 87 MG/G; MG/G
1 OINTMENT TOPICAL EVERY 12 HOURS SCHEDULED
Start: 2022-08-12 | End: 2022-09-30 | Stop reason: HOSPADM

## 2022-08-12 RX ORDER — FERROUS SULFATE 325(65) MG
325 TABLET ORAL
Start: 2022-08-12 | End: 2022-09-30 | Stop reason: HOSPADM

## 2022-08-12 RX ORDER — DIAZEPAM 2 MG/1
4 TABLET ORAL 3 TIMES DAILY PRN
Qty: 15 TABLET | Refills: 0 | Status: SHIPPED | OUTPATIENT
Start: 2022-08-12 | End: 2022-08-18

## 2022-08-12 RX ADMIN — HEPARIN SODIUM 5000 UNITS: 5000 INJECTION INTRAVENOUS; SUBCUTANEOUS at 08:14

## 2022-08-12 RX ADMIN — INSULIN LISPRO 2 UNITS: 100 INJECTION, SOLUTION INTRAVENOUS; SUBCUTANEOUS at 08:19

## 2022-08-12 RX ADMIN — HYDRALAZINE HYDROCHLORIDE 25 MG: 25 TABLET, FILM COATED ORAL at 05:12

## 2022-08-12 RX ADMIN — LIDOCAINE 1 PATCH: 50 PATCH CUTANEOUS at 08:14

## 2022-08-12 RX ADMIN — AMLODIPINE BESYLATE 10 MG: 10 TABLET ORAL at 08:15

## 2022-08-12 RX ADMIN — GUAIFENESIN 1200 MG: 600 TABLET, EXTENDED RELEASE ORAL at 08:14

## 2022-08-12 RX ADMIN — FERROUS SULFATE TAB 325 MG (65 MG ELEMENTAL FE) 325 MG: 325 (65 FE) TAB at 08:14

## 2022-08-12 RX ADMIN — ASPIRIN 81 MG: 81 TABLET, COATED ORAL at 08:14

## 2022-08-12 RX ADMIN — BUMETANIDE 2 MG: 2 TABLET ORAL at 08:15

## 2022-08-12 RX ADMIN — DICLOFENAC 2 G: 10 GEL TOPICAL at 08:14

## 2022-08-12 RX ADMIN — INSULIN LISPRO 12 UNITS: 100 INJECTION, SOLUTION INTRAVENOUS; SUBCUTANEOUS at 08:24

## 2022-08-12 RX ADMIN — INSULIN DETEMIR 30 UNITS: 100 INJECTION, SOLUTION SUBCUTANEOUS at 08:24

## 2022-08-12 RX ADMIN — VITAM B12 100 MCG: 100 TAB at 08:14

## 2022-08-12 RX ADMIN — AMOXICILLIN AND CLAVULANATE POTASSIUM 1 TABLET: 875; 125 TABLET, FILM COATED ORAL at 08:15

## 2022-08-12 RX ADMIN — PREGABALIN 50 MG: 50 CAPSULE ORAL at 08:15

## 2022-08-12 RX ADMIN — ISOSORBIDE DINITRATE 20 MG: 20 TABLET ORAL at 08:14

## 2022-08-12 RX ADMIN — CASTOR OIL AND BALSAM, PERU 1 APPLICATION: 788; 87 OINTMENT TOPICAL at 08:14

## 2022-08-12 RX ADMIN — CYCLOBENZAPRINE 5 MG: 10 TABLET, FILM COATED ORAL at 07:13

## 2022-08-12 NOTE — CASE MANAGEMENT/SOCIAL WORK
Case Management Discharge Note      Final Note: To [facility] at 11 am today. Nursing to call report to [phone]. Insurance has given approval. [facility] will transport her at 11. I will update patient/daughter.     Please provide covid test    Selected Continued Care - Admitted Since 7/26/2022     Destination Coordination complete.    Service Provider Selected Services Address Phone Fax Patient Preferred    [facility]  Skilled Nursing [address] [place] [address] [phone] --          Durable Medical Equipment    No services have been selected for the patient.              Dialysis/Infusion    No services have been selected for the patient.              Home Medical Care    No services have been selected for the patient.              Therapy    No services have been selected for the patient.              Community Resources    No services have been selected for the patient.              Community & DME    No services have been selected for the patient.                  Transportation Services  Ambulance: [facility]    Final Discharge Disposition Code: 03 - skilled nursing facility (SNF)

## 2022-08-12 NOTE — PLAN OF CARE
1200ml/day fluid restriction continues. C/o BLE muscle spasm, prn flexeril given, effective. VSS, 1L of O2, NSR on tele. Pt turned q2hrs. Possible d/c to [place] at [facility] 8-12-22 CM working on transport. Dr. Gee, ID, and hospitalist following.       Goal Outcome Evaluation:

## 2022-08-12 NOTE — DISCHARGE SUMMARY
[facility]  DISCHARGE SUMMARY    Patient Name: Susan Anderson  : 1940  MRN: [id number]    Date of Admission: 2022  8:32 PM  Date of Discharge: 2022  Primary Care Physician: Arleen Doherty MD    Consults       Date and Time Order Name Status Description    2022  2:26 PM Inpatient Nephrology Consult Completed     2022  2:16 PM Inpatient Orthopedic Surgery Consult      2022 10:06 AM Inpatient Infectious Diseases Consult Completed     2022  4:00 AM Inpatient Cardiology Consult Completed           Hospital Course     Presenting Problem:   Weakness [R53.1]    Active Hospital Problems    Diagnosis  POA    **Weakness [R53.1]  Yes    Effusion of right knee [M25.461]  Yes    Right knee pain [M25.561]  Yes    Anemia [D64.9]  Yes    Fall [W19.XXXA]  Yes    Dizziness [R42]  Yes    Acoustic neuroma (HCC) [D33.3]  Yes    Acute on chronic combined systolic and diastolic congestive heart failure (HCC) [I50.43]  Yes    NICM (nonischemic cardiomyopathy) (Formerly [facility]) [I42.8]  Yes    Coronary artery disease involving native coronary artery of native heart without angina pectoris [I25.10]  Yes    Dyslipidemia [E78.5]  Yes    Chronic combined systolic and diastolic heart failure (HCC) [I50.42]  Yes    Mitral valve disease [I05.9]  Yes    Acute hypoxemic respiratory failure (HCC) [J96.01]  No    PVD (peripheral vascular disease) (Formerly [facility]) [I73.9]  Yes    Essential hypertension [I10]  Yes    CKD (chronic kidney disease), stage III (HCC) [N18.30]  Yes      Resolved Hospital Problems    Diagnosis Date Resolved POA    Acute renal failure superimposed on stage 3 chronic kidney disease (HCC) [N17.9, N18.30] 2022 Yes    Elevated troponin [R77.8] 2022 Yes      Hospital Course:  Susan Anderson is a 82 y.o. female with PMH significant for HTN, HLD, CAD, non-ischemic cardiomyopathy, chronic combined systolic/diastolic CHF, CKD III, PVD, insulin-dependent DMII,  ELIUD (on 2L NC QHS, chronic BLE edema, and acoustic neuroma. She was admitted to [facility] 7/26/22 for mild rhabdomyolysis after a fall at home. Also found to have a/c CHF exacerbation and LACEY.      Fall at home  Generalized weakness  - Fall at home in bathroom when she opted not to wait for her bath aide to help her  - Cardiology has evaluated and feel her falls are likely mechanical and not cardiac. May have some orthostasis. Patient does report she feels better with SBP in 150's, so allowing some permissive hypertension   - PT/OT following - to [facility] for rehab today      Rhabdomyolysis, resolved   -  on admission, s/p IV fluids      Acute on chronic combined systolic/diastolic CHF  Acute respiratory failure with hypoxia  Nonischemic cardiomyopathy   LACEY on CKD III - baseline 1.3-1.5  - Appreciate nephrology assistance with diuresis   - On Bumex 1mg daily 7/28-8/3, then increased to 1mg BID 8/4-8/6, then increased to 2mg BID 8/6-8/8  - Worsening edema after Bumex decreased to 1mg BID on 8/9 - discussed with nephrology increased back to 2mg BID 8/10  - 8/1 renal artery duplex suggestive of intrinsic disease but no significant renal artery stenosis or obstructive uropathy   - Creatinine 1.9 on admission, has since returned to baseline  - CXR 8/3 showed congestive heart failure with moderate bibasilar effusion/atelectasis  - s/p IV albumin   - 1200mL fluid restriction per nephrology  - O2 sats documented as low as 87%. Has required up to 2L NC to maintain O2 saturations, still desaturating into 80's during sleep. Wean O2 as able.  - Follow up with nephrology in 2 weeks - will need repeat BMP prior to appointment  - Follow up with [facility] in 1 week  - Please repeat BMP in 5 days - send results with patient to [facility] appointment  - Follow up with Dr. Ceballos in 6-8 weeks      Blood culture (+) MRSA, suspected contaminant   - 7/26 - 2 of 2 blood cultures (+) staph epidermis, 1 of  2 positive for MRSA  - Repeat blood cultures on 7/31 NGTD  - Appreciate ID assistance. No source of infection has been identified. Suspect contaminant. IV antibiotics stopped    Right knee effusion   - Orthopedic surgery has evaluated  - Joint aspiration not consistent with infection. Culture NGTD  - MRI of R knee does not suggest structural injury to the knee  - WBAT. Knee immobilizer on for comfort  - Follow up with Dr. Pearce in 2  weeks      Low back / leg spasms   - PRN Robaxin ineffective per patient  - Discontinued Baclofen due to mental status changes  - Continue Flexeril 5mg TID PRN, Valium 4mg TID PRN  - PRN Tylenol      Chronic anemia   - PO iron supplement      Insulin-dependent DMII   Diabetic peripheral neuropathy  - Hgb A1c 9.0%  - DC on Lantus 30 units QHS, Lispro 12 units TID AC  - Now that LACEY resolved, increase Lyrica to 100mg BID     History of R great toe osteomyelitis  - s/p TMA 4/2021 by Dr. Finney.   - Culture (+) MSSA  - Has been on chronic suppressive Augmentin for ~1 year  - Back on Augmentin 875mg PO daily per ID   - Follow up with Dr. Pete as previously scheduled      Dizziness  Acoustic neuroma, chronic  - ? contributing to falls  - Had initial w/u for neuroma at [facility]  - Has not had recent follow up imaging     Essential hypertension  - Beta blocker stopped due to bradycardia  - Restarted Lipitor now that Daptomycin has been stopped  - At DC, continue Amlodipine 10mg daily, Terazosin 2mg QHS, Bumex 1.5mg BID, Hydralazine 25mg TID, Isordil 20mg BID       Constipation  - Continue bowel regimen  - Last BM 8/11    Day of Discharge     HPI:   Sitting in bed, eating cornflakes with blueberries. She had some issues with muscle spasms overnight - Flexeril and Valium are helpful. She is nervous about going to the [place] because she won't know anyone. She hopes she gets good physical therapy and is motivated to walk. Still requiring O2 at night but does well off of O2 when she is awake.      Review of Systems  Gen- No fevers, chills  CV- No chest pain, palpitations  Resp- No cough, dyspnea  GI- No N/V/D, abd pain    Vital Signs:   Temp:  [96.7 °F (35.9 °C)-98 °F (36.7 °C)] 98 °F (36.7 °C)  Heart Rate:  [73-91] 82  Resp:  [18-20] 18  BP: (145-187)/(63-95) 157/73  Flow (L/min):  [1] 1    Physical Exam:  Constitutional: Chronically-ill woman in no acute distress, sitting upright in bed. Awake, alert and conversant  HENT: NCAT, mucous membranes moist   Respiratory: Fine crackles appreciated in bilateral lower lobes. Maintains 90-94% O2 saturation on room air. Normal respiratory effort   Cardiovascular: RRR, no murmurs, rubs, or gallops  Gastrointestinal: Positive bowel sounds, firm, nontender  Musculoskeletal: 2+ pitting BLE edema up to buttocks, trace-1+ BUE edema with R > L  Psychiatric: Cooperative, speech clear and spontaneous   Neurologic: Oriented x 3, strength symmetric in all extremities, generalized weakness,   Skin: No rashes, diffuse ecchymosis on BUE, BLE dry/scaly    Pertinent  and/or Most Recent Results     LAB RESULTS:      Lab 08/07/22  0933   WBC 5.89   HEMOGLOBIN 9.2*   HEMATOCRIT 29.0*   PLATELETS 220   NEUTROS ABS 4.05   IMMATURE GRANS (ABS) 0.08*   LYMPHS ABS 0.86   MONOS ABS 0.78   EOS ABS 0.09   MCV 92.4         Lab 08/11/22  0449 08/10/22  0641 08/07/22  0933 08/06/22  1418   SODIUM 140 144 141 143   POTASSIUM 3.9 4.2 4.1 4.5   CHLORIDE 98 102 100 102   CO2 35.0* 37.0* 32.0* 29.0   ANION GAP 7.0 5.0 9.0 12.0   BUN 47* 51* 60* 62*   CREATININE 1.12* 1.05* 1.15* 1.24*   EGFR 48.9* 52.8* 47.4* 43.3*   GLUCOSE 194* 119* 171* 197*   CALCIUM 8.3* 8.4* 9.0 9.2     Brief Urine Lab Results  (Last result in the past 365 days)        Color   Clarity   Blood   Leuk Est   Nitrite   Protein   CREAT   Urine HCG        07/31/22 1503             27.0               Microbiology Results (last 10 days)       ** No results found for the last 240 hours. **          MRI Lumbar Spine Without  Contrast    Result Date: 8/6/2022  DATE OF EXAM: 8/6/2022 11:58 AM  PROCEDURE: MRI LUMBAR SPINE WO CONTRAST-  INDICATIONS: Lumbar radiculopathy, symptoms persist with > 6 wks treatment; R53.1-Weakness; W19.XXXA-Unspecified fall, initial encounter; R74.8-Abnormal levels of other serum enzymes; R77.8-Other specified abnormalities of plasma proteins; N18.9-Chronic kidney disease, unspecified; R73.9-Hyperglycemia, unspecified  COMPARISON: MRI lumbar spine February 18, 2022, CT scan lumbar spine July 26, 2022  TECHNIQUE: Routine magnetic resonance imaging of the lumbar spine was performed without the administration of contrast.  FINDINGS: There are some degenerative endplate changes about the L2-3 and L4-5 levels Modic type II. There is a slight anterolisthesis of L4 on L5 of about 4.9 mm. There is marked narrowing of the disc space at L2-3. The conus is around the level of L1. There is a curvature to the lumbar spine with convexity to the right.  T12-L1: There is no disc herniation. There is some suggested narrowing of the intervertebral foramina.  L1-L2: There is broad-based bulging of the annulus. There is some facet arthropathy. The intervertebral foramina appear patent.  L2-L3: Marked narrowing of the disc space. There is some signal within the disc space that may relate to some fluid or sequela of degenerative change and has been suggested. There is a broad-based impression on the thecal sac which could reflect more of an osseous bar. There is facet arthropathy and ligamentum flavum redundancy resulting in severe central canal stenosis. There is posterior epidural lipomatosis. There is moderate narrowing of the left intervertebral foramina and severe narrowing of the right intervertebral foramina. There is a marginal osteophyte along the right lateral aspect of the disc space.  L3-L4: Broad-based bulging of the annulus. There is facet arthropathy and ligament flavum redundancy resulting in moderate to severe  central canal stenosis. There is moderate narrowing of both intervertebral foramina.  L4-5: There is an anterolisthesis of L4 on L5. There is superimposed bulging of the annulus. There is advanced facet arthropathy. It looks like there is a marginal osteophyte along the left lateral aspect of the disc space. There is moderate narrowing of the right intervertebral foramina and severe narrowing of the left intervertebral foramina. There is severe narrowing of the central canal.  L5-S1: There is bulging of the annulus. There is a marginal osteophyte along left lateral aspect of the disc space. There is facet arthropathy. There is moderate narrowing of the intervertebral foramina. There is some epidural lipomatosis at this level.        1.  Multilevel advanced degenerative changes similar to the more recent imaging.  This report was finalized on 8/6/2022 12:33 PM by Fam Hunt MD.      XR Chest 1 View    Result Date: 8/6/2022  DATE OF EXAM: 8/6/2022 10:39 AM  PROCEDURE: XR CHEST 1 VW-  INDICATIONS: short of breath; R53.1-Weakness; W19.XXXA-Unspecified fall, initial encounter; R74.8-Abnormal levels of other serum enzymes; R77.8-Other specified abnormalities of plasma proteins; N18.9-Chronic kidney disease, unspecified; R73.9-Hyperglycemia, unspecified  COMPARISON: August 3, 2022  TECHNIQUE: Single radiographic AP view of the chest was obtained.  FINDINGS: There are bibasilar densities which could relate to effusions. There some prominence of pulmonary vascular markings which could reflect some vascular congestion and edema. The heart looks enlarged.      1.  There are findings that could reflect changes from heart failure which have been suggested.  This report was finalized on 8/6/2022 11:16 AM by Fam Hunt MD.      XR Chest 1 View    Result Date: 8/3/2022  DATE OF EXAM: 8/3/2022 11:30 AM  PROCEDURE: XR CHEST 1 VW-  INDICATIONS: hypoxia; R53.1-Weakness; W19.XXXA-Unspecified fall, initial encounter; R74.8-Abnormal  levels of other serum enzymes; R77.8-Other specified abnormalities of plasma proteins; N18.9-Chronic kidney disease, unspecified; R73.9-Hyperglycemia, unspecified  COMPARISON: 7/26/2022  TECHNIQUE: Single radiographic AP view of the chest was obtained.  FINDINGS: The heart is enlarged and there is now extensive pulmonary interstitial edema, with moderate bibasilar effusion and atelectasis. No pneumothorax is seen. Appearance is consistent with congestive heart failure.      Congestive heart failure with moderate bibasilar effusion/atelectasis.  This report was finalized on 8/3/2022 12:17 PM by Dr. Breezy Rey MD.      Duplex Renal Artery - Bilateral Complete CAR    Result Date: 8/1/2022  DATE OF EXAM: 8/1/2022 2:11 PM  PROCEDURE: DUPLEX RENAL ARTERY BILATERAL COMPLETE CAR-  INDICATIONS: other  COMPARISON: No comparisons available.  TECHNIQUE: Grayscale, color-flow, and Doppler spectral waveform analysis was performed of the kidneys, renal vasculature and aorta.  FINDINGS: The study was limited to due to patient body habitus and limited cooperation the right kidney has a maximal mzrj-vh-sbai length of 11.2 cm. The left kidney has a maximal vcyn-gt-emvb length of 12.2 cm. The echo pattern of both kidneys is normal. There are no masses and there is no hydronephrosis. The renal veins are patent. Resistive indices on the right are 0.7 on the left 0.8 maximal renal flow velocities on the right five and on the left is 13.1 cm/s. The peak systolic velocity in the aorta is 107 cm/s. The right renal aortic ratio is 0.5 and the left 0.6.        1. Limited study due to the patient's body habitus and lack of cooperation. 2. Elevated resistive indices which may reflect underlying intrinsic renal disease. 3. No evidence of obstructive uropathy or significant renal artery stenosis.  This report was finalized on 8/1/2022 4:20 PM by Trey Saba MD.      Results for orders placed during the hospital encounter of 07/26/22    Duplex  Renal Artery - Bilateral Complete CAR    Interpretation Summary  DATE OF EXAM: 8/1/2022 2:11 PM    PROCEDURE: DUPLEX RENAL ARTERY BILATERAL COMPLETE CAR-    INDICATIONS: other    COMPARISON: No comparisons available.    TECHNIQUE: Grayscale, color-flow, and Doppler spectral waveform analysis  was performed of the kidneys, renal vasculature and aorta.    FINDINGS:  The study was limited to due to patient body habitus and limited  cooperation the right kidney has a maximal cggq-eq-ahhy length of 11.2  cm. The left kidney has a maximal ebqs-nq-mfzb length of 12.2 cm. The  echo pattern of both kidneys is normal. There are no masses and there is  no hydronephrosis. The renal veins are patent. Resistive indices on the  right are 0.7 on the left 0.8 maximal renal flow velocities on the right  five and on the left is 13.1 cm/s. The peak systolic velocity in the  aorta is 107 cm/s. The right renal aortic ratio is 0.5 and the left 0.6.    Impression  1. Limited study due to the patient's body habitus and lack of  cooperation.  2. Elevated resistive indices which may reflect underlying intrinsic  renal disease.  3. No evidence of obstructive uropathy or significant renal artery  stenosis.    This report was finalized on 8/1/2022 4:20 PM by Trey Saba MD.    Results for orders placed during the hospital encounter of 07/26/22    Adult Transthoracic Echo Complete W/ Cont if Necessary Per Protocol    Interpretation Summary  · Normal left ventricular systolic function, estimated EF 55%.  · Aortic sclerosis without aortic stenosis or regurgitation.  · Mild mitral regurgitation.    Discharge Details        Discharge Medications        New Medications        Instructions Start Date   acetaminophen 325 MG tablet  Commonly known as: TYLENOL   650 mg, Oral, Every 4 Hours PRN      bisacodyl 5 MG EC tablet  Commonly known as: DULCOLAX   10 mg, Oral, Daily PRN      bisacodyl 10 MG suppository  Commonly known as: DULCOLAX   10 mg, Rectal,  Daily PRN      castor oil-balsam peru ointment   1 application, Topical, Every 12 Hours Scheduled, Apply to pressure wound on coccyx      cyclobenzaprine 5 MG tablet  Commonly known as: FLEXERIL   5 mg, Oral, 3 Times Daily PRN      diazePAM 2 MG tablet  Commonly known as: VALIUM   4 mg, Oral, 3 Times Daily PRN      Diclofenac Sodium 1 % gel gel  Commonly known as: VOLTAREN   2 g, Topical, 4 Times Daily, Right knee      ferrous sulfate 325 (65 FE) MG tablet   325 mg, Oral, Daily With Breakfast      guaiFENesin 600 MG 12 hr tablet  Commonly known as: MUCINEX   1,200 mg, Oral, 2 Times Daily      hydrALAZINE 25 MG tablet  Commonly known as: APRESOLINE   25 mg, Oral, Every 8 Hours Scheduled      magnesium hydroxide 2400 MG/10ML suspension suspension  Commonly known as: MILK OF MAGNESIA   10 mL, Oral, Daily PRN      sennosides-docusate 8.6-50 MG per tablet  Commonly known as: PERICOLACE   1 tablet, Oral, Nightly   Start Date: August 13, 2022            Changes to Medications        Instructions Start Date   amLODIPine 5 MG tablet  Commonly known as: NORVASC  What changed: how much to take   10 mg, Oral, Daily      atorvastatin 40 MG tablet  Commonly known as: LIPITOR  What changed:   medication strength  how much to take   40 mg, Oral, Daily      bumetanide 1 MG tablet  Commonly known as: BUMEX  What changed:   medication strength  how much to take  when to take this  additional instructions   1.5 mg, Oral, 2 Times Daily      insulin glargine 100 UNIT/ML injection  Commonly known as: Lantus  What changed: See the new instructions.   30 Units, Subcutaneous, Nightly      insulin lispro 100 UNIT/ML injection  Commonly known as: HumaLOG  What changed:   how much to take  how to take this  when to take this  additional instructions   12 Units, Subcutaneous, 3 Times Daily Before Meals      pregabalin 100 MG capsule  Commonly known as: Lyrica  What changed:   how much to take  how to take this  when to take this   100 mg, Oral,  "2 Times Daily, 100mg in the morning and 200mg at night             Continue These Medications        Instructions Start Date   Accu-Chek FastClix Lancets misc   Use TID      glucose blood test strip   Accu-Chek SmartView Test Strips      Accu-Chek SmartView test strip  Generic drug: glucose blood   TID daily prn      glucose blood test strip   Use as instructed      allopurinol 300 MG tablet  Commonly known as: ZYLOPRIM   300 mg, Oral, Daily      amoxicillin-clavulanate 875-125 MG per tablet  Commonly known as: AUGMENTIN   1 tablet, Oral, Daily      aspirin 81 MG EC tablet   81 mg, Oral, Daily      BD Insulin Syringe U-500 31G X 6MM 0.5 ML misc  Generic drug: Insulin Syringe/Needle U-500   3 applicators, Does not apply, Daily PRN      Insulin Syringe-Needle U-100 31G X 15/64\" 1 ML misc   BD Veo Insulin Syringe Ultra-Fine 1 mL 31 gauge x 15/64\"      BD Veo Insulin Syringe U/F 31G X 15/64\" 0.3 ML misc  Generic drug: Insulin Syringe-Needle U-100   Check CBG TID      isosorbide dinitrate 20 MG tablet  Commonly known as: ISORDIL   20 mg, Oral, 2 Times Daily      potassium chloride 10 MEQ CR tablet   10 mEq, Oral, Daily      vitamin B-12 100 MCG tablet  Commonly known as: CYANOCOBALAMIN   1 tablet, Oral, Daily             Stop These Medications      carvedilol 12.5 MG tablet  Commonly known as: COREG     clotrimazole-betamethasone 1-0.05 % cream  Commonly known as: LOTRISONE            Allergies   Allergen Reactions    Cephalexin Nausea Only    Erythromycin Base Nausea Only    Melatonin Other (See Comments)     Nightmares    Oxycodone Nausea Only    Sulfa Antibiotics Nausea Only     Discharge Disposition:  Skilled Nursing Facility (DC - External)    Diet:  Diet Order   Procedures    Diet Regular; Consistent Carbohydrate, Cardiac, Daily Fluid Restriction; Other; 1,200     Activity:  Activity Instructions       Activity as Tolerated      Up WIth Assist            CODE STATUS:    Code Status and Medical Interventions: "   Ordered at: 07/27/22 0143     Code Status (Patient has no pulse and is not breathing):    CPR (Attempt to Resuscitate)     Medical Interventions (Patient has pulse or is breathing):    Full Support     Future Appointments   Date Time Provider Department Center   8/12/2022 11:00 AM [facility] 2 [facility]   9/13/2022  2:00 PM Arleen Doherty MD [facility]   12/16/2022  2:30 PM Anjum Ceballos MD [facility]     Additional Instructions for the Follow-ups that You Need to Schedule       Discharge Follow-up with Specified Provider: [facility] in 6-8 weeks   As directed      To: [facility] in 6-8 weeks         Discharge Follow-up with Specified Provider: [facility] in 1 week   As directed      To: [facility] in 1 week         Discharge Follow-up with Specified Provider: Nephrology ([facility] or [facility] if possible) in 2 weeks   As directed      To: Nephrology ([facility] or [facility] if possible) in 2 weeks               Anna Crystal PA-C  08/12/22    Time Spent on Discharge:  I spent 45 minutes on this discharge activity which included: face-to-face encounter with the patient, reviewing the data in the system, coordination of the care with the nursing staff as well as consultants, documentation, and entering orders.

## 2022-08-12 NOTE — PLAN OF CARE
Problem: Fall Injury Risk  Goal: Absence of Fall and Fall-Related Injury  Outcome: Met  Intervention: Identify and Manage Contributors  Recent Flowsheet Documentation  Taken 8/12/2022 0815 by Ny Morgan RN  Medication Review/Management: medications reviewed  Intervention: Promote Injury-Free Environment  Recent Flowsheet Documentation  Taken 8/12/2022 1000 by Ny Morgan RN  Safety Promotion/Fall Prevention:   activity supervised   assistive device/personal items within reach   clutter free environment maintained   elopement precautions   fall prevention program maintained   gait belt   nonskid shoes/slippers when out of bed   room organization consistent   safety round/check completed  Taken 8/12/2022 0815 by Ny Morgan RN  Safety Promotion/Fall Prevention:   activity supervised   assistive device/personal items within reach   clutter free environment maintained   elopement precautions   fall prevention program maintained   gait belt   nonskid shoes/slippers when out of bed   room organization consistent   safety round/check completed     Problem: Skin Injury Risk Increased  Goal: Skin Health and Integrity  Outcome: Met  Intervention: Optimize Skin Protection  Recent Flowsheet Documentation  Taken 8/12/2022 1000 by Ny Morgan RN  Pressure Reduction Techniques: frequent weight shift encouraged  Head of Bed (HOB) Positioning: HOB at 30-45 degrees  Pressure Reduction Devices: specialty bed utilized  Skin Protection:   adhesive use limited   incontinence pads utilized   transparent dressing maintained   tubing/devices free from skin contact  Taken 8/12/2022 0815 by Ny Morgan RN  Pressure Reduction Techniques: frequent weight shift encouraged  Head of Bed (HOB) Positioning: HOB at 30-45 degrees  Pressure Reduction Devices: specialty bed utilized  Skin Protection:   adhesive use limited   incontinence pads utilized   transparent dressing maintained   tubing/devices free from skin  contact     Problem: Adult Inpatient Plan of Care  Goal: Plan of Care Review  Outcome: Met  Goal: Patient-Specific Goal (Individualized)  Outcome: Met  Goal: Absence of Hospital-Acquired Illness or Injury  Outcome: Met  Intervention: Identify and Manage Fall Risk  Recent Flowsheet Documentation  Taken 8/12/2022 1000 by Ny Morgan RN  Safety Promotion/Fall Prevention:   activity supervised   assistive device/personal items within reach   clutter free environment maintained   elopement precautions   fall prevention program maintained   gait belt   nonskid shoes/slippers when out of bed   room organization consistent   safety round/check completed  Taken 8/12/2022 0815 by Ny Morgan RN  Safety Promotion/Fall Prevention:   activity supervised   assistive device/personal items within reach   clutter free environment maintained   elopement precautions   fall prevention program maintained   gait belt   nonskid shoes/slippers when out of bed   room organization consistent   safety round/check completed  Intervention: Prevent Skin Injury  Recent Flowsheet Documentation  Taken 8/12/2022 1000 by Ny Morgan RN  Body Position: neutral body alignment  Skin Protection:   adhesive use limited   incontinence pads utilized   transparent dressing maintained   tubing/devices free from skin contact  Taken 8/12/2022 0815 by Ny Morgan RN  Body Position: weight shifting  Skin Protection:   adhesive use limited   incontinence pads utilized   transparent dressing maintained   tubing/devices free from skin contact  Intervention: Prevent and Manage VTE (Venous Thromboembolism) Risk  Recent Flowsheet Documentation  Taken 8/12/2022 1000 by Ny Morgan RN  VTE Prevention/Management: (sq heparin) other (see comments)  Taken 8/12/2022 0815 by Ny Morgan RN  VTE Prevention/Management: (sq heparin) other (see comments)  Intervention: Prevent Infection  Recent Flowsheet Documentation  Taken 8/12/2022  1000 by Ny Morgan, RN  Infection Prevention: environmental surveillance performed  Taken 8/12/2022 0815 by Ny Morgan, RN  Infection Prevention: environmental surveillance performed  Goal: Optimal Comfort and Wellbeing  Outcome: Met  Intervention: Provide Person-Centered Care  Recent Flowsheet Documentation  Taken 8/12/2022 1000 by Ny Morgan RN  Trust Relationship/Rapport: care explained  Taken 8/12/2022 0815 by Ny Morgan RN  Trust Relationship/Rapport: care explained  Goal: Readiness for Transition of Care  Outcome: Met   Goal Outcome Evaluation:

## 2022-08-17 ENCOUNTER — TELEPHONE (OUTPATIENT)
Dept: CARDIOLOGY | Facility: CLINIC | Age: 82
End: 2022-08-17

## 2022-08-17 ENCOUNTER — TELEPHONE (OUTPATIENT)
Dept: CARDIOLOGY | Facility: HOSPITAL | Age: 82
End: 2022-08-17

## 2022-08-17 NOTE — TELEPHONE ENCOUNTER
Agree with increased dose of Bumex. If creatinine increased on lab work, will need to reach out to nephrology. Keep appointment with heart and valve on Friday. If worsening shortness of breath, will need to go to the ER. Can move appointment to September.

## 2022-08-17 NOTE — TELEPHONE ENCOUNTER
Spoke with DEANN Olvera at [facility] regarding plan. Message sent to scheduling to move appt up to sept.

## 2022-08-17 NOTE — TELEPHONE ENCOUNTER
May with [facility] called to report pt is having BLE, starting to blister and weep, 3lb weight gain in 24 hours. 3+ pitting edema in hands and pt feels like her abdomen is more swollen today. Isabelle at [facility] increased Bumex from 1.5 mg BID to 2 mg BID yesterday (So far only had 1 dose last night). [facility] schedule for today, they will fax results to us once signed. They are faxing labs to us as well. Isabelle at [facility] may change Bumex depending on lab work completed yesterday, once they review. Established pt with [facility] with a f/u appt on 8/25. [facility] is faxing blood work to them as well.    Last set of vitals taken this morning-  97.9 temp  HR 86  RR 20  /88  O2 97 on RA    Advised I would forward to AA for further recommendations, but he may want her nephrologist to manage diuretic therapy. They want to know if she needs a sooner appt with AA?

## 2022-08-17 NOTE — TELEPHONE ENCOUNTER
Patient's daughter called and reports that patient is currently at the [facility] and has been sitting in a wheelchair dangling her legs.  However, she reports she actually has been more active.  She denies any shortness of breath.  Recently required albumin while in the hospital due to worsening renal function and edema.  She sees nephrology next week.  Her Bumex yesterday was increased to 2 mg twice a day.  She did gain 3 pounds in 24 hours.  She has an upcoming HOMER to assess arterial blood flow prior to compression wraps.  She would like to bring her in tomorrow.  I made her appointment for tomorrow for further assessment and possible IV diuretics

## 2022-08-17 NOTE — PROGRESS NOTES
"[facility]  Heart and Valve Center    Chief Complaint  Congestive Heart Failure, Edema, and Follow-up    Subjective    History of Present Illness {CC  Problem List  Visit  Diagnosis   Encounters  Notes  Medications  Labs  Result Review Imaging  Media :23}     Susan Anderson is a 82 y.o. 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 Patient recently admitted 7/26-8/12 with weakness and rhabdomyolysis after a fall.  Patient reportedly had a mechanical fall in her bathroom and was able to get for quite some time.  She was discharged to the [facility].  Due to LACEY her Bumex was decreased but she had worsening edema.  Nephrology was consulted and she was given IV albumin.  Her beta-blocker was stopped due to bradycardia.  She was discharged on Bumex 1.5 mg twice daily.  Her daughter called yesterday because she is having worsening lower extremity edema and weeping in her legs.  She reports that she was doing great until she started becoming more active with therapy and now sits in her wheelchair most of the day dangling her legs.  Her Bumex was increased to 2 mg twice a day at the [facility].  She also has compression wraps.  She denies any worsening shortness of breath, orthopnea or PND.  She does note abdominal distention      Objective     Vital Signs:   Vitals:    08/18/22 1127   BP: 138/75   BP Location: Left arm   Patient Position: Sitting   Cuff Size: Adult   Pulse: 85   Resp: 20   Temp: 97.6 °F (36.4 °C)   TempSrc: Temporal   SpO2: 91%   Weight: 99.3 kg (219 lb)   Height: 160 cm (62.99\")     Body mass index is 38.81 kg/m².  Physical Exam  Vitals reviewed.   Constitutional:       Appearance: Normal appearance.   HENT:      Head: Normocephalic.   Neck:      Vascular: No carotid bruit. "   Cardiovascular:      Rate and Rhythm: Normal rate. Rhythm irregular.      Pulses: Normal pulses.      Heart sounds: Normal heart sounds, S1 normal and S2 normal. No murmur heard.     Comments: Compression wraps present  Pulmonary:      Effort: Pulmonary effort is normal. No respiratory distress.      Breath sounds: Examination of the right-lower field reveals rales. Rales present.   Chest:      Chest wall: No tenderness.   Abdominal:      General: Abdomen is flat. There is distension.      Palpations: Abdomen is soft.   Musculoskeletal:      Cervical back: Neck supple.      Right lower leg: 3+ Pitting Edema present.      Left lower leg: 3+ Pitting Edema present.   Skin:     General: Skin is warm and dry.   Neurological:      General: No focal deficit present.      Mental Status: She is alert and oriented to person, place, and time. Mental status is at baseline.   Psychiatric:         Attention and Perception: Attention normal.         Mood and Affect: Mood normal.         Thought Content: Thought content normal.              Result Review  Data Reviewed:{ Labs  Result Review  Imaging  Med Tab  Media :23}     Cardiac Catheterization/Vascular Study (06/21/2021 09:22)  Adult Transthoracic Echo Complete W/ Cont if Necessary Per Protocol (06/04/2021 16:21)    Adult Transthoracic Echo Complete W/ Cont if Necessary Per Protocol (06/09/2022 15:32)    Adult Transthoracic Echo Complete W/ Cont if Necessary Per Protocol (07/29/2022 16:55)       Assessment and Plan {CC Problem List  Visit Diagnosis  ROS  Review (Popup)  Health Maintenance  Quality  BestPractice  Medications  SmartSets  SnapShot Encounters  Media :23}   1.  Acute on chronic HFpEF  IV diuresis today in office. Patient received 80mg lasix today through a butterfly in the RFA over slow IV push. During IV diuresis, vitals were monitored and stable. Please see IV diuresis record for those vitals. Butterfly was d/c'd and area was free of erythema,  ecchymosis, or drainage.  Patient will receive a follow up call from the HF center in 24 hours to evaluate urinary output and reassess signs and symptoms.   -BMP , proBNP today      2. Atrial fibrillation with controlled ventricular rate  Unclear onset.  She is asymptomatic with this.  Rates are running in the 80s to low 100s.  Her carvedilol 12.5 mg was stopped in the hospital due to bradycardia.  We will start her on low-dose metoprolol tartrate 25 mg twice daily.  Orders were written for the [facility] and advised to monitor heart rates closely and to hold for heart rate less than 55  Atrial fibrillation education provided today including: causes of afib, s/s, risks for stroke and use of anticoagulation for stroke prevention and treatment of atrial fibrillation.   [id number] score is 7.  Discussed risks of stroke versus risk of bleeding and falls.  We will go ahead and start Eliquis 2.5 mg twice daily.  First dose given today in office, provided sample (LUTHER [id number], exp 6/23). Will discuss care with Dr. Ceballos. For now, will continue ASA for PAD and nonobstructive CAD    3.  CKD, III B   Has appt next Thursday with nephrology. Will have nephrology labs drawn on Monday    4. HTN  Mostly controlled. SBPs 136-154 at the [facility]  Continue to monitor  Amlodipine increased back to 10mg in the hospital. If swelling persists, would consider decreasing amlodipine to 5mg and increasing hydralazine as long as BP stable    Keep scheduled appt with nephrology and appt with Dr. Ceballos 9/9      Follow Up {Instructions Charge Capture  Follow-up Communications :23}   Return in about 1 week (around 8/25/2022) for Office follow up, AF, HF.    Patient was given instructions and counseling regarding her condition or for health maintenance advice. Please see specific information pulled into the AVS if appropriate.  Advised to call the Heart and Valve Center with any questions, concerns, or worsening symptoms.

## 2022-08-18 ENCOUNTER — OFFICE VISIT (OUTPATIENT)
Dept: CARDIOLOGY | Facility: HOSPITAL | Age: 82
End: 2022-08-18

## 2022-08-18 ENCOUNTER — HOSPITAL ENCOUNTER (OUTPATIENT)
Dept: CARDIOLOGY | Facility: HOSPITAL | Age: 82
Discharge: HOME | End: 2022-08-18

## 2022-08-18 VITALS
HEIGHT: 63 IN | WEIGHT: 219 LBS | DIASTOLIC BLOOD PRESSURE: 75 MMHG | BODY MASS INDEX: 38.8 KG/M2 | HEART RATE: 85 BPM | OXYGEN SATURATION: 91 % | TEMPERATURE: 97.6 F | SYSTOLIC BLOOD PRESSURE: 138 MMHG | RESPIRATION RATE: 20 BRPM

## 2022-08-18 DIAGNOSIS — I49.9 IRREGULAR HEART BEAT: ICD-10-CM

## 2022-08-18 DIAGNOSIS — I50.33 ACUTE ON CHRONIC HEART FAILURE WITH PRESERVED EJECTION FRACTION: Primary | ICD-10-CM

## 2022-08-18 DIAGNOSIS — I48.91 ATRIAL FIBRILLATION, CONTROLLED: ICD-10-CM

## 2022-08-18 DIAGNOSIS — I10 ESSENTIAL HYPERTENSION: ICD-10-CM

## 2022-08-18 DIAGNOSIS — N18.32 STAGE 3B CHRONIC KIDNEY DISEASE: ICD-10-CM

## 2022-08-18 LAB
ANION GAP SERPL CALCULATED.3IONS-SCNC: 13 MMOL/L (ref 5–15)
BUN SERPL-MCNC: 44 MG/DL (ref 8–23)
BUN/CREAT SERPL: 29.9 (ref 7–25)
CALCIUM SPEC-SCNC: 9.1 MG/DL (ref 8.6–10.5)
CHLORIDE SERPL-SCNC: 99 MMOL/L (ref 98–107)
CO2 SERPL-SCNC: 27 MMOL/L (ref 22–29)
CREAT SERPL-MCNC: 1.47 MG/DL (ref 0.57–1)
EGFRCR SERPLBLD CKD-EPI 2021: 35.3 ML/MIN/1.73
GLUCOSE SERPL-MCNC: 52 MG/DL (ref 65–99)
NT-PROBNP SERPL-MCNC: 3961 PG/ML (ref 0–1800)
POTASSIUM SERPL-SCNC: 4.3 MMOL/L (ref 3.5–5.2)
QT INTERVAL: 350 MS
QTC INTERVAL: 464 MS
SODIUM SERPL-SCNC: 139 MMOL/L (ref 136–145)

## 2022-08-18 PROCEDURE — 93005 ELECTROCARDIOGRAM TRACING: CPT | Performed by: NURSE PRACTITIONER

## 2022-08-18 PROCEDURE — 83880 ASSAY OF NATRIURETIC PEPTIDE: CPT | Performed by: NURSE PRACTITIONER

## 2022-08-18 PROCEDURE — 93010 ELECTROCARDIOGRAM REPORT: CPT | Performed by: INTERNAL MEDICINE

## 2022-08-18 PROCEDURE — 80048 BASIC METABOLIC PNL TOTAL CA: CPT | Performed by: NURSE PRACTITIONER

## 2022-08-18 PROCEDURE — 99214 OFFICE O/P EST MOD 30 MIN: CPT | Performed by: NURSE PRACTITIONER

## 2022-08-19 ENCOUNTER — TELEPHONE (OUTPATIENT)
Dept: CARDIOLOGY | Facility: HOSPITAL | Age: 82
End: 2022-08-19

## 2022-08-19 NOTE — TELEPHONE ENCOUNTER
Called patient's daughter with lab results and to check on patient.  She reports her doctor on the phone she was doing pretty well this morning and had already worked with PT.  She is not sure what her weight was this morning, but she did note that she had some increased urination this morning.  Advised that labs were stable and to fax labs from nephrology that she will get done on Monday.  Advised to call with any worsening symptoms

## 2022-08-21 ENCOUNTER — HOSPITAL ENCOUNTER (INPATIENT)
Facility: HOSPITAL | Age: 82
LOS: 13 days | Discharge: NURSING, REHAB OR LONG-TERM CARE | End: 2022-09-03
Attending: EMERGENCY MEDICINE | Admitting: FAMILY MEDICINE

## 2022-08-21 ENCOUNTER — APPOINTMENT (OUTPATIENT)
Dept: GENERAL RADIOLOGY | Facility: HOSPITAL | Age: 82
End: 2022-08-21

## 2022-08-21 DIAGNOSIS — D64.9 ANEMIA, UNSPECIFIED TYPE: ICD-10-CM

## 2022-08-21 DIAGNOSIS — R13.11 ORAL PHASE DYSPHAGIA: ICD-10-CM

## 2022-08-21 DIAGNOSIS — R77.8 ELEVATED TROPONIN: ICD-10-CM

## 2022-08-21 DIAGNOSIS — N17.9 ACUTE KIDNEY INJURY: ICD-10-CM

## 2022-08-21 DIAGNOSIS — I50.9 ACUTE ON CHRONIC CONGESTIVE HEART FAILURE, UNSPECIFIED HEART FAILURE TYPE: Primary | ICD-10-CM

## 2022-08-21 DIAGNOSIS — R00.1 BRADYCARDIA: ICD-10-CM

## 2022-08-21 DIAGNOSIS — J96.21 ACUTE ON CHRONIC RESPIRATORY FAILURE WITH HYPOXIA: ICD-10-CM

## 2022-08-21 PROBLEM — L89.159 PRESSURE INJURY OF SKIN OF SACRAL REGION: Status: ACTIVE | Noted: 2022-08-21

## 2022-08-21 PROBLEM — I50.23 ACUTE ON CHRONIC SYSTOLIC HEART FAILURE: Status: ACTIVE | Noted: 2022-08-21

## 2022-08-21 PROBLEM — D63.8 ANEMIA, CHRONIC DISEASE: Status: ACTIVE | Noted: 2022-07-27

## 2022-08-21 PROBLEM — I48.91 ATRIAL FIBRILLATION: Status: ACTIVE | Noted: 2022-08-21

## 2022-08-21 LAB
ALBUMIN SERPL-MCNC: 3.1 G/DL (ref 3.5–5.2)
ALBUMIN/GLOB SERPL: 1 G/DL
ALP SERPL-CCNC: 363 U/L (ref 39–117)
ALT SERPL W P-5'-P-CCNC: 46 U/L (ref 1–33)
ANION GAP SERPL CALCULATED.3IONS-SCNC: 13 MMOL/L (ref 5–15)
AST SERPL-CCNC: 64 U/L (ref 1–32)
BASOPHILS # BLD MANUAL: 0 10*3/MM3 (ref 0–0.2)
BASOPHILS NFR BLD MANUAL: 0 % (ref 0–1.5)
BILIRUB SERPL-MCNC: 0.6 MG/DL (ref 0–1.2)
BUN SERPL-MCNC: 65 MG/DL (ref 8–23)
BUN/CREAT SERPL: 30.7 (ref 7–25)
BURR CELLS BLD QL SMEAR: ABNORMAL
CALCIUM SPEC-SCNC: 8.2 MG/DL (ref 8.6–10.5)
CHLORIDE SERPL-SCNC: 99 MMOL/L (ref 98–107)
CO2 SERPL-SCNC: 26 MMOL/L (ref 22–29)
CREAT SERPL-MCNC: 2.12 MG/DL (ref 0.57–1)
DACRYOCYTES BLD QL SMEAR: ABNORMAL
DEPRECATED RDW RBC AUTO: 62.9 FL (ref 37–54)
EGFRCR SERPLBLD CKD-EPI 2021: 22.7 ML/MIN/1.73
EOSINOPHIL # BLD MANUAL: 0.13 10*3/MM3 (ref 0–0.4)
EOSINOPHIL NFR BLD MANUAL: 2 % (ref 0.3–6.2)
ERYTHROCYTE [DISTWIDTH] IN BLOOD BY AUTOMATED COUNT: 18.5 % (ref 12.3–15.4)
FLUAV SUBTYP SPEC NAA+PROBE: NOT DETECTED
FLUBV RNA ISLT QL NAA+PROBE: NOT DETECTED
GLOBULIN UR ELPH-MCNC: 3 GM/DL
GLUCOSE BLDC GLUCOMTR-MCNC: 81 MG/DL (ref 70–130)
GLUCOSE SERPL-MCNC: 118 MG/DL (ref 65–99)
HCT VFR BLD AUTO: 31.4 % (ref 34–46.6)
HGB BLD-MCNC: 9.6 G/DL (ref 12–15.9)
HOLD SPECIMEN: NORMAL
LYMPHOCYTES # BLD MANUAL: 0.89 10*3/MM3 (ref 0.7–3.1)
LYMPHOCYTES NFR BLD MANUAL: 11 % (ref 5–12)
MAGNESIUM SERPL-MCNC: 1.9 MG/DL (ref 1.6–2.4)
MCH RBC QN AUTO: 29.1 PG (ref 26.6–33)
MCHC RBC AUTO-ENTMCNC: 30.6 G/DL (ref 31.5–35.7)
MCV RBC AUTO: 95.2 FL (ref 79–97)
METAMYELOCYTES NFR BLD MANUAL: 2 % (ref 0–0)
MONOCYTES # BLD: 0.7 10*3/MM3 (ref 0.1–0.9)
MYELOCYTES NFR BLD MANUAL: 1 % (ref 0–0)
NEUTROPHILS # BLD AUTO: 4.43 10*3/MM3 (ref 1.7–7)
NEUTROPHILS NFR BLD MANUAL: 62 % (ref 42.7–76)
NEUTS BAND NFR BLD MANUAL: 8 % (ref 0–5)
NRBC SPEC MANUAL: 0 /100 WBC (ref 0–0.2)
OVALOCYTES BLD QL SMEAR: ABNORMAL
PLAT MORPH BLD: NORMAL
PLATELET # BLD AUTO: 285 10*3/MM3 (ref 140–450)
PMV BLD AUTO: 11.3 FL (ref 6–12)
POTASSIUM SERPL-SCNC: 4.7 MMOL/L (ref 3.5–5.2)
PROT SERPL-MCNC: 6.1 G/DL (ref 6–8.5)
QT INTERVAL: 484 MS
QTC INTERVAL: 428 MS
RBC # BLD AUTO: 3.3 10*6/MM3 (ref 3.77–5.28)
SARS-COV-2 RNA PNL SPEC NAA+PROBE: NOT DETECTED
SODIUM SERPL-SCNC: 138 MMOL/L (ref 136–145)
T4 FREE SERPL-MCNC: 1.2 NG/DL (ref 0.93–1.7)
TROPONIN T SERPL-MCNC: 0.33 NG/ML (ref 0–0.03)
TSH SERPL DL<=0.05 MIU/L-ACNC: 5.49 UIU/ML (ref 0.27–4.2)
VARIANT LYMPHS NFR BLD MANUAL: 14 % (ref 19.6–45.3)
WBC MORPH BLD: NORMAL
WBC NRBC COR # BLD: 6.33 10*3/MM3 (ref 3.4–10.8)
WHOLE BLOOD HOLD COAG: NORMAL
WHOLE BLOOD HOLD SPECIMEN: NORMAL

## 2022-08-21 PROCEDURE — 83735 ASSAY OF MAGNESIUM: CPT | Performed by: EMERGENCY MEDICINE

## 2022-08-21 PROCEDURE — 25010000002 ALBUMIN HUMAN 25% PER 50 ML: Performed by: INTERNAL MEDICINE

## 2022-08-21 PROCEDURE — 85007 BL SMEAR W/DIFF WBC COUNT: CPT | Performed by: EMERGENCY MEDICINE

## 2022-08-21 PROCEDURE — 87636 SARSCOV2 & INF A&B AMP PRB: CPT | Performed by: EMERGENCY MEDICINE

## 2022-08-21 PROCEDURE — 85025 COMPLETE CBC W/AUTO DIFF WBC: CPT | Performed by: EMERGENCY MEDICINE

## 2022-08-21 PROCEDURE — 80053 COMPREHEN METABOLIC PANEL: CPT | Performed by: EMERGENCY MEDICINE

## 2022-08-21 PROCEDURE — 82962 GLUCOSE BLOOD TEST: CPT

## 2022-08-21 PROCEDURE — 84439 ASSAY OF FREE THYROXINE: CPT | Performed by: EMERGENCY MEDICINE

## 2022-08-21 PROCEDURE — 99285 EMERGENCY DEPT VISIT HI MDM: CPT

## 2022-08-21 PROCEDURE — 94799 UNLISTED PULMONARY SVC/PX: CPT

## 2022-08-21 PROCEDURE — 99223 1ST HOSP IP/OBS HIGH 75: CPT | Performed by: INTERNAL MEDICINE

## 2022-08-21 PROCEDURE — 93005 ELECTROCARDIOGRAM TRACING: CPT | Performed by: EMERGENCY MEDICINE

## 2022-08-21 PROCEDURE — P9612 CATHETERIZE FOR URINE SPEC: HCPCS

## 2022-08-21 PROCEDURE — 71045 X-RAY EXAM CHEST 1 VIEW: CPT

## 2022-08-21 PROCEDURE — 84484 ASSAY OF TROPONIN QUANT: CPT | Performed by: EMERGENCY MEDICINE

## 2022-08-21 PROCEDURE — 84443 ASSAY THYROID STIM HORMONE: CPT | Performed by: EMERGENCY MEDICINE

## 2022-08-21 PROCEDURE — P9047 ALBUMIN (HUMAN), 25%, 50ML: HCPCS | Performed by: INTERNAL MEDICINE

## 2022-08-21 RX ORDER — AMOXICILLIN AND CLAVULANATE POTASSIUM 875; 125 MG/1; MG/1
1 TABLET, FILM COATED ORAL DAILY
Status: DISCONTINUED | OUTPATIENT
Start: 2022-08-22 | End: 2022-08-22

## 2022-08-21 RX ORDER — SODIUM CHLORIDE 0.9 % (FLUSH) 0.9 %
10 SYRINGE (ML) INJECTION EVERY 12 HOURS SCHEDULED
Status: DISCONTINUED | OUTPATIENT
Start: 2022-08-21 | End: 2022-09-03 | Stop reason: HOSPADM

## 2022-08-21 RX ORDER — DIAZEPAM 2 MG/1
4 TABLET ORAL EVERY 8 HOURS PRN
Status: DISPENSED | OUTPATIENT
Start: 2022-08-21 | End: 2022-08-28

## 2022-08-21 RX ORDER — ATORVASTATIN CALCIUM 40 MG/1
40 TABLET, FILM COATED ORAL DAILY
Status: DISCONTINUED | OUTPATIENT
Start: 2022-08-22 | End: 2022-09-03 | Stop reason: HOSPADM

## 2022-08-21 RX ORDER — CHOLECALCIFEROL (VITAMIN D3) 50 MCG
2000 TABLET ORAL DAILY
Status: ON HOLD | COMMUNITY
End: 2023-01-03

## 2022-08-21 RX ORDER — DEXTROSE MONOHYDRATE 25 G/50ML
25 INJECTION, SOLUTION INTRAVENOUS
Status: DISCONTINUED | OUTPATIENT
Start: 2022-08-21 | End: 2022-08-22 | Stop reason: SDUPTHER

## 2022-08-21 RX ORDER — PREGABALIN 75 MG/1
75 CAPSULE ORAL DAILY
Status: DISCONTINUED | OUTPATIENT
Start: 2022-08-22 | End: 2022-08-24

## 2022-08-21 RX ORDER — POLYETHYLENE GLYCOL 3350 17 G/17G
17 POWDER, FOR SOLUTION ORAL DAILY PRN
Status: DISCONTINUED | OUTPATIENT
Start: 2022-08-21 | End: 2022-09-03 | Stop reason: HOSPADM

## 2022-08-21 RX ORDER — ISOSORBIDE DINITRATE 20 MG/1
20 TABLET ORAL 2 TIMES DAILY
Status: DISCONTINUED | OUTPATIENT
Start: 2022-08-21 | End: 2022-09-03 | Stop reason: HOSPADM

## 2022-08-21 RX ORDER — INSULIN LISPRO 100 [IU]/ML
0-9 INJECTION, SOLUTION INTRAVENOUS; SUBCUTANEOUS
Status: DISCONTINUED | OUTPATIENT
Start: 2022-08-21 | End: 2022-09-03 | Stop reason: HOSPADM

## 2022-08-21 RX ORDER — SODIUM CHLORIDE 0.9 % (FLUSH) 0.9 %
10 SYRINGE (ML) INJECTION AS NEEDED
Status: DISCONTINUED | OUTPATIENT
Start: 2022-08-21 | End: 2022-09-03 | Stop reason: HOSPADM

## 2022-08-21 RX ORDER — HYDRALAZINE HYDROCHLORIDE 25 MG/1
25 TABLET, FILM COATED ORAL EVERY 8 HOURS SCHEDULED
Status: DISCONTINUED | OUTPATIENT
Start: 2022-08-21 | End: 2022-09-03 | Stop reason: HOSPADM

## 2022-08-21 RX ORDER — CYCLOBENZAPRINE HCL 5 MG
5 TABLET ORAL ONCE
Status: COMPLETED | OUTPATIENT
Start: 2022-08-21 | End: 2022-08-21

## 2022-08-21 RX ORDER — ACETAMINOPHEN 650 MG/1
650 SUPPOSITORY RECTAL EVERY 4 HOURS PRN
Status: DISCONTINUED | OUTPATIENT
Start: 2022-08-21 | End: 2022-08-24

## 2022-08-21 RX ORDER — AMOXICILLIN 250 MG
2 CAPSULE ORAL 2 TIMES DAILY
Status: DISCONTINUED | OUTPATIENT
Start: 2022-08-21 | End: 2022-09-03 | Stop reason: HOSPADM

## 2022-08-21 RX ORDER — ACETAMINOPHEN 160 MG/5ML
650 SOLUTION ORAL EVERY 4 HOURS PRN
Status: DISCONTINUED | OUTPATIENT
Start: 2022-08-21 | End: 2022-08-24

## 2022-08-21 RX ORDER — BUMETANIDE 0.25 MG/ML
1 INJECTION INTRAMUSCULAR; INTRAVENOUS ONCE
Status: COMPLETED | OUTPATIENT
Start: 2022-08-21 | End: 2022-08-21

## 2022-08-21 RX ORDER — CASTOR OIL AND BALSAM, PERU 788; 87 MG/G; MG/G
1 OINTMENT TOPICAL EVERY 12 HOURS SCHEDULED
Status: DISCONTINUED | OUTPATIENT
Start: 2022-08-21 | End: 2022-08-31

## 2022-08-21 RX ORDER — ACETAMINOPHEN 325 MG/1
650 TABLET ORAL EVERY 4 HOURS PRN
Status: DISCONTINUED | OUTPATIENT
Start: 2022-08-21 | End: 2022-09-03 | Stop reason: HOSPADM

## 2022-08-21 RX ORDER — ASPIRIN 81 MG/1
81 TABLET ORAL DAILY
Status: DISCONTINUED | OUTPATIENT
Start: 2022-08-22 | End: 2022-09-03 | Stop reason: HOSPADM

## 2022-08-21 RX ORDER — ALBUMIN (HUMAN) 12.5 G/50ML
12.5 SOLUTION INTRAVENOUS ONCE
Status: COMPLETED | OUTPATIENT
Start: 2022-08-21 | End: 2022-08-21

## 2022-08-21 RX ORDER — INSULIN LISPRO 100 [IU]/ML
10 INJECTION, SOLUTION INTRAVENOUS; SUBCUTANEOUS
Status: DISCONTINUED | OUTPATIENT
Start: 2022-08-21 | End: 2022-08-21

## 2022-08-21 RX ORDER — PREGABALIN 100 MG/1
100 CAPSULE ORAL DAILY
Status: DISCONTINUED | OUTPATIENT
Start: 2022-08-22 | End: 2022-08-21

## 2022-08-21 RX ORDER — CYCLOBENZAPRINE HCL 10 MG
5 TABLET ORAL 3 TIMES DAILY PRN
Status: DISCONTINUED | OUTPATIENT
Start: 2022-08-21 | End: 2022-08-26

## 2022-08-21 RX ORDER — NICOTINE POLACRILEX 4 MG
15 LOZENGE BUCCAL
Status: DISCONTINUED | OUTPATIENT
Start: 2022-08-21 | End: 2022-08-22 | Stop reason: SDUPTHER

## 2022-08-21 RX ORDER — GUAIFENESIN 600 MG/1
1200 TABLET, EXTENDED RELEASE ORAL 2 TIMES DAILY
Status: DISCONTINUED | OUTPATIENT
Start: 2022-08-21 | End: 2022-09-03 | Stop reason: HOSPADM

## 2022-08-21 RX ORDER — BISACODYL 5 MG/1
5 TABLET, DELAYED RELEASE ORAL DAILY PRN
Status: DISCONTINUED | OUTPATIENT
Start: 2022-08-21 | End: 2022-09-03 | Stop reason: HOSPADM

## 2022-08-21 RX ORDER — FERROUS SULFATE 325(65) MG
325 TABLET ORAL
Status: DISCONTINUED | OUTPATIENT
Start: 2022-08-22 | End: 2022-09-03 | Stop reason: HOSPADM

## 2022-08-21 RX ORDER — LANOLIN ALCOHOL/MO/W.PET/CERES
3 CREAM (GRAM) TOPICAL NIGHTLY
COMMUNITY
End: 2022-09-03 | Stop reason: HOSPADM

## 2022-08-21 RX ORDER — BISACODYL 10 MG
10 SUPPOSITORY, RECTAL RECTAL DAILY PRN
Status: DISCONTINUED | OUTPATIENT
Start: 2022-08-21 | End: 2022-09-03 | Stop reason: HOSPADM

## 2022-08-21 RX ADMIN — CYCLOBENZAPRINE 5 MG: 10 TABLET, FILM COATED ORAL at 20:33

## 2022-08-21 RX ADMIN — ALBUMIN HUMAN 12.5 G: 0.25 SOLUTION INTRAVENOUS at 16:56

## 2022-08-21 RX ADMIN — BUMETANIDE 1 MG: 0.25 INJECTION INTRAMUSCULAR; INTRAVENOUS at 16:55

## 2022-08-21 RX ADMIN — GUAIFENESIN 1200 MG: 600 TABLET, EXTENDED RELEASE ORAL at 20:33

## 2022-08-21 RX ADMIN — CASTOR OIL AND BALSAM, PERU 1 APPLICATION: 788; 87 OINTMENT TOPICAL at 20:35

## 2022-08-21 RX ADMIN — Medication 10 ML: at 20:34

## 2022-08-21 RX ADMIN — HYDRALAZINE HYDROCHLORIDE 25 MG: 25 TABLET ORAL at 16:56

## 2022-08-21 RX ADMIN — HYDRALAZINE HYDROCHLORIDE 25 MG: 25 TABLET ORAL at 20:33

## 2022-08-21 RX ADMIN — APIXABAN 2.5 MG: 2.5 TABLET, FILM COATED ORAL at 20:33

## 2022-08-21 RX ADMIN — CYCLOBENZAPRINE HYDROCHLORIDE 5 MG: 5 TABLET, FILM COATED ORAL at 13:41

## 2022-08-21 RX ADMIN — SENNOSIDES AND DOCUSATE SODIUM 2 TABLET: 50; 8.6 TABLET ORAL at 20:33

## 2022-08-21 RX ADMIN — ISOSORBIDE DINITRATE 20 MG: 20 TABLET ORAL at 20:33

## 2022-08-21 RX ADMIN — BUMETANIDE 1 MG: 0.25 INJECTION INTRAMUSCULAR; INTRAVENOUS at 11:58

## 2022-08-22 LAB
GLUCOSE BLDC GLUCOMTR-MCNC: 158 MG/DL (ref 70–130)
GLUCOSE BLDC GLUCOMTR-MCNC: 234 MG/DL (ref 70–130)
GLUCOSE BLDC GLUCOMTR-MCNC: 342 MG/DL (ref 70–130)
QT INTERVAL: 428 MS
QTC INTERVAL: 434 MS

## 2022-08-22 PROCEDURE — 97597 DBRDMT OPN WND 1ST 20 CM/<: CPT

## 2022-08-22 PROCEDURE — 63710000001 INSULIN LISPRO (HUMAN) PER 5 UNITS: Performed by: INTERNAL MEDICINE

## 2022-08-22 PROCEDURE — 97166 OT EVAL MOD COMPLEX 45 MIN: CPT

## 2022-08-22 PROCEDURE — 82962 GLUCOSE BLOOD TEST: CPT

## 2022-08-22 PROCEDURE — 99222 1ST HOSP IP/OBS MODERATE 55: CPT | Performed by: INTERNAL MEDICINE

## 2022-08-22 PROCEDURE — 29580 STRAPPING UNNA BOOT: CPT

## 2022-08-22 PROCEDURE — 93005 ELECTROCARDIOGRAM TRACING: CPT | Performed by: INTERNAL MEDICINE

## 2022-08-22 PROCEDURE — 63710000001 INSULIN DETEMIR PER 5 UNITS: Performed by: INTERNAL MEDICINE

## 2022-08-22 PROCEDURE — 93010 ELECTROCARDIOGRAM REPORT: CPT | Performed by: INTERNAL MEDICINE

## 2022-08-22 PROCEDURE — 97162 PT EVAL MOD COMPLEX 30 MIN: CPT

## 2022-08-22 PROCEDURE — 99233 SBSQ HOSP IP/OBS HIGH 50: CPT | Performed by: INTERNAL MEDICINE

## 2022-08-22 RX ORDER — BUMETANIDE 0.25 MG/ML
2 INJECTION INTRAMUSCULAR; INTRAVENOUS EVERY 12 HOURS
Status: DISCONTINUED | OUTPATIENT
Start: 2022-08-23 | End: 2022-08-24

## 2022-08-22 RX ORDER — BUMETANIDE 0.25 MG/ML
2.5 INJECTION INTRAMUSCULAR; INTRAVENOUS ONCE
Status: COMPLETED | OUTPATIENT
Start: 2022-08-22 | End: 2022-08-22

## 2022-08-22 RX ORDER — INSULIN LISPRO 100 [IU]/ML
5 INJECTION, SOLUTION INTRAVENOUS; SUBCUTANEOUS
Status: DISCONTINUED | OUTPATIENT
Start: 2022-08-22 | End: 2022-08-23

## 2022-08-22 RX ORDER — DEXTROSE MONOHYDRATE 25 G/50ML
25 INJECTION, SOLUTION INTRAVENOUS
Status: DISCONTINUED | OUTPATIENT
Start: 2022-08-22 | End: 2022-09-03 | Stop reason: HOSPADM

## 2022-08-22 RX ORDER — NICOTINE POLACRILEX 4 MG
15 LOZENGE BUCCAL
Status: DISCONTINUED | OUTPATIENT
Start: 2022-08-22 | End: 2022-09-03 | Stop reason: HOSPADM

## 2022-08-22 RX ORDER — INSULIN LISPRO 100 [IU]/ML
4 INJECTION, SOLUTION INTRAVENOUS; SUBCUTANEOUS
Status: DISCONTINUED | OUTPATIENT
Start: 2022-08-22 | End: 2022-08-22

## 2022-08-22 RX ORDER — AMOXICILLIN AND CLAVULANATE POTASSIUM 500; 125 MG/1; MG/1
1 TABLET, FILM COATED ORAL EVERY 12 HOURS SCHEDULED
Status: DISCONTINUED | OUTPATIENT
Start: 2022-08-22 | End: 2022-08-24

## 2022-08-22 RX ADMIN — ATORVASTATIN CALCIUM 40 MG: 40 TABLET, FILM COATED ORAL at 08:04

## 2022-08-22 RX ADMIN — CASTOR OIL AND BALSAM, PERU 1 APPLICATION: 788; 87 OINTMENT TOPICAL at 08:04

## 2022-08-22 RX ADMIN — AMOXICILLIN AND CLAVULANATE POTASSIUM 500 MG: 500; 125 TABLET, FILM COATED ORAL at 20:44

## 2022-08-22 RX ADMIN — APIXABAN 2.5 MG: 2.5 TABLET, FILM COATED ORAL at 08:04

## 2022-08-22 RX ADMIN — ASPIRIN 81 MG: 81 TABLET, COATED ORAL at 08:04

## 2022-08-22 RX ADMIN — INSULIN DETEMIR 20 UNITS: 100 INJECTION, SOLUTION SUBCUTANEOUS at 08:05

## 2022-08-22 RX ADMIN — SENNOSIDES AND DOCUSATE SODIUM 2 TABLET: 50; 8.6 TABLET ORAL at 08:04

## 2022-08-22 RX ADMIN — BUMETANIDE 2.5 MG: 0.25 INJECTION INTRAMUSCULAR; INTRAVENOUS at 15:11

## 2022-08-22 RX ADMIN — DIAZEPAM 4 MG: 2 TABLET ORAL at 21:09

## 2022-08-22 RX ADMIN — INSULIN LISPRO 2 UNITS: 100 INJECTION, SOLUTION INTRAVENOUS; SUBCUTANEOUS at 08:05

## 2022-08-22 RX ADMIN — SENNOSIDES AND DOCUSATE SODIUM 2 TABLET: 50; 8.6 TABLET ORAL at 20:44

## 2022-08-22 RX ADMIN — GUAIFENESIN 1200 MG: 600 TABLET, EXTENDED RELEASE ORAL at 08:04

## 2022-08-22 RX ADMIN — GUAIFENESIN 1200 MG: 600 TABLET, EXTENDED RELEASE ORAL at 20:44

## 2022-08-22 RX ADMIN — Medication 10 ML: at 20:45

## 2022-08-22 RX ADMIN — HYDRALAZINE HYDROCHLORIDE 25 MG: 25 TABLET ORAL at 15:11

## 2022-08-22 RX ADMIN — INSULIN LISPRO 5 UNITS: 100 INJECTION, SOLUTION INTRAVENOUS; SUBCUTANEOUS at 17:51

## 2022-08-22 RX ADMIN — HYDRALAZINE HYDROCHLORIDE 25 MG: 25 TABLET ORAL at 20:44

## 2022-08-22 RX ADMIN — AMOXICILLIN AND CLAVULANATE POTASSIUM 500 MG: 500; 125 TABLET, FILM COATED ORAL at 08:48

## 2022-08-22 RX ADMIN — CASTOR OIL AND BALSAM, PERU 1 APPLICATION: 788; 87 OINTMENT TOPICAL at 20:45

## 2022-08-22 RX ADMIN — APIXABAN 2.5 MG: 2.5 TABLET, FILM COATED ORAL at 20:45

## 2022-08-22 RX ADMIN — INSULIN LISPRO 4 UNITS: 100 INJECTION, SOLUTION INTRAVENOUS; SUBCUTANEOUS at 17:51

## 2022-08-22 RX ADMIN — Medication 10 ML: at 08:07

## 2022-08-22 RX ADMIN — INSULIN LISPRO 7 UNITS: 100 INJECTION, SOLUTION INTRAVENOUS; SUBCUTANEOUS at 11:57

## 2022-08-22 RX ADMIN — ISOSORBIDE DINITRATE 20 MG: 20 TABLET ORAL at 20:45

## 2022-08-22 RX ADMIN — FERROUS SULFATE TAB 325 MG (65 MG ELEMENTAL FE) 325 MG: 325 (65 FE) TAB at 08:04

## 2022-08-22 RX ADMIN — HYDRALAZINE HYDROCHLORIDE 25 MG: 25 TABLET ORAL at 05:18

## 2022-08-22 RX ADMIN — ISOSORBIDE DINITRATE 20 MG: 20 TABLET ORAL at 08:04

## 2022-08-22 RX ADMIN — PREGABALIN 75 MG: 75 CAPSULE ORAL at 08:04

## 2022-08-23 LAB
ARTERIAL PATENCY WRIST A: POSITIVE
ATMOSPHERIC PRESS: ABNORMAL MM[HG]
BACTERIA UR QL AUTO: ABNORMAL /HPF
BASE EXCESS BLDA CALC-SCNC: 0.9 MMOL/L (ref 0–2)
BDY SITE: ABNORMAL
BILIRUB UR QL STRIP: NEGATIVE
BODY TEMPERATURE: 37 C
CLARITY UR: ABNORMAL
CO2 BLDA-SCNC: 30 MMOL/L (ref 22–33)
COHGB MFR BLD: 1.2 % (ref 0–2)
COLOR UR: YELLOW
CREAT UR-MCNC: 83.8 MG/DL
GLUCOSE BLDC GLUCOMTR-MCNC: 100 MG/DL (ref 70–130)
GLUCOSE BLDC GLUCOMTR-MCNC: 143 MG/DL (ref 70–130)
GLUCOSE BLDC GLUCOMTR-MCNC: 151 MG/DL (ref 70–130)
GLUCOSE BLDC GLUCOMTR-MCNC: 263 MG/DL (ref 70–130)
GLUCOSE BLDC GLUCOMTR-MCNC: 310 MG/DL (ref 70–130)
GLUCOSE UR STRIP-MCNC: NEGATIVE MG/DL
HCO3 BLDA-SCNC: 28.2 MMOL/L (ref 20–26)
HCT VFR BLD CALC: 29.4 % (ref 38–51)
HGB BLDA-MCNC: 9.6 G/DL (ref 14–18)
HGB UR QL STRIP.AUTO: NEGATIVE
HYALINE CASTS UR QL AUTO: ABNORMAL /LPF
INHALED O2 CONCENTRATION: 36 %
KETONES UR QL STRIP: NEGATIVE
LEUKOCYTE ESTERASE UR QL STRIP.AUTO: ABNORMAL
METHGB BLD QL: 0.4 % (ref 0–1.5)
MODALITY: ABNORMAL
NITRITE UR QL STRIP: NEGATIVE
NOTE: ABNORMAL
OXYHGB MFR BLDV: 96.1 % (ref 94–99)
PCO2 BLDA: 58.6 MM HG (ref 35–45)
PCO2 TEMP ADJ BLD: 58.6 MM HG (ref 35–45)
PH BLDA: 7.29 PH UNITS (ref 7.35–7.45)
PH UR STRIP.AUTO: <=5 [PH] (ref 5–8)
PH, TEMP CORRECTED: 7.29 PH UNITS
PO2 BLDA: 99.3 MM HG (ref 83–108)
PO2 TEMP ADJ BLD: 99.3 MM HG (ref 83–108)
PROT UR QL STRIP: ABNORMAL
RBC # UR STRIP: ABNORMAL /HPF
REF LAB TEST METHOD: ABNORMAL
SODIUM UR-SCNC: <20 MMOL/L
SP GR UR STRIP: 1.02 (ref 1–1.03)
SQUAMOUS #/AREA URNS HPF: ABNORMAL /HPF
UROBILINOGEN UR QL STRIP: ABNORMAL
UUN 24H UR-MCNC: 410 MG/DL
VENTILATOR MODE: ABNORMAL
WBC # UR STRIP: ABNORMAL /HPF
YEAST URNS QL MICRO: ABNORMAL /HPF

## 2022-08-23 PROCEDURE — 25010000002 ALBUMIN HUMAN 25% PER 50 ML: Performed by: PHYSICIAN ASSISTANT

## 2022-08-23 PROCEDURE — 63710000001 INSULIN LISPRO (HUMAN) PER 5 UNITS: Performed by: PHYSICIAN ASSISTANT

## 2022-08-23 PROCEDURE — 81001 URINALYSIS AUTO W/SCOPE: CPT | Performed by: EMERGENCY MEDICINE

## 2022-08-23 PROCEDURE — 82962 GLUCOSE BLOOD TEST: CPT

## 2022-08-23 PROCEDURE — 83050 HGB METHEMOGLOBIN QUAN: CPT

## 2022-08-23 PROCEDURE — 82805 BLOOD GASES W/O2 SATURATION: CPT

## 2022-08-23 PROCEDURE — 99232 SBSQ HOSP IP/OBS MODERATE 35: CPT | Performed by: PHYSICIAN ASSISTANT

## 2022-08-23 PROCEDURE — 92610 EVALUATE SWALLOWING FUNCTION: CPT

## 2022-08-23 PROCEDURE — 82570 ASSAY OF URINE CREATININE: CPT | Performed by: INTERNAL MEDICINE

## 2022-08-23 PROCEDURE — 63710000001 INSULIN LISPRO (HUMAN) PER 5 UNITS: Performed by: INTERNAL MEDICINE

## 2022-08-23 PROCEDURE — 63710000001 INSULIN DETEMIR PER 5 UNITS: Performed by: INTERNAL MEDICINE

## 2022-08-23 PROCEDURE — P9047 ALBUMIN (HUMAN), 25%, 50ML: HCPCS | Performed by: PHYSICIAN ASSISTANT

## 2022-08-23 PROCEDURE — 84300 ASSAY OF URINE SODIUM: CPT | Performed by: INTERNAL MEDICINE

## 2022-08-23 PROCEDURE — 84540 ASSAY OF URINE/UREA-N: CPT | Performed by: INTERNAL MEDICINE

## 2022-08-23 PROCEDURE — 36600 WITHDRAWAL OF ARTERIAL BLOOD: CPT

## 2022-08-23 PROCEDURE — 82375 ASSAY CARBOXYHB QUANT: CPT

## 2022-08-23 RX ORDER — ALBUMIN (HUMAN) 12.5 G/50ML
12.5 SOLUTION INTRAVENOUS ONCE
Status: COMPLETED | OUTPATIENT
Start: 2022-08-23 | End: 2022-08-23

## 2022-08-23 RX ORDER — INSULIN LISPRO 100 [IU]/ML
8 INJECTION, SOLUTION INTRAVENOUS; SUBCUTANEOUS
Status: DISCONTINUED | OUTPATIENT
Start: 2022-08-23 | End: 2022-08-24

## 2022-08-23 RX ADMIN — HYDRALAZINE HYDROCHLORIDE 25 MG: 25 TABLET ORAL at 05:29

## 2022-08-23 RX ADMIN — AMOXICILLIN AND CLAVULANATE POTASSIUM 500 MG: 500; 125 TABLET, FILM COATED ORAL at 22:37

## 2022-08-23 RX ADMIN — ALBUMIN HUMAN 12.5 G: 0.25 SOLUTION INTRAVENOUS at 17:40

## 2022-08-23 RX ADMIN — GUAIFENESIN 1200 MG: 600 TABLET, EXTENDED RELEASE ORAL at 11:12

## 2022-08-23 RX ADMIN — AMOXICILLIN AND CLAVULANATE POTASSIUM 500 MG: 500; 125 TABLET, FILM COATED ORAL at 11:23

## 2022-08-23 RX ADMIN — APIXABAN 2.5 MG: 2.5 TABLET, FILM COATED ORAL at 22:37

## 2022-08-23 RX ADMIN — HYDRALAZINE HYDROCHLORIDE 25 MG: 25 TABLET ORAL at 12:51

## 2022-08-23 RX ADMIN — SENNOSIDES AND DOCUSATE SODIUM 2 TABLET: 50; 8.6 TABLET ORAL at 11:12

## 2022-08-23 RX ADMIN — SENNOSIDES AND DOCUSATE SODIUM 2 TABLET: 50; 8.6 TABLET ORAL at 22:36

## 2022-08-23 RX ADMIN — PREGABALIN 75 MG: 75 CAPSULE ORAL at 11:12

## 2022-08-23 RX ADMIN — INSULIN LISPRO 7 UNITS: 100 INJECTION, SOLUTION INTRAVENOUS; SUBCUTANEOUS at 12:51

## 2022-08-23 RX ADMIN — INSULIN DETEMIR 25 UNITS: 100 INJECTION, SOLUTION SUBCUTANEOUS at 09:00

## 2022-08-23 RX ADMIN — HYDRALAZINE HYDROCHLORIDE 25 MG: 25 TABLET ORAL at 22:37

## 2022-08-23 RX ADMIN — ISOSORBIDE DINITRATE 20 MG: 20 TABLET ORAL at 11:12

## 2022-08-23 RX ADMIN — INSULIN LISPRO 8 UNITS: 100 INJECTION, SOLUTION INTRAVENOUS; SUBCUTANEOUS at 17:41

## 2022-08-23 RX ADMIN — GUAIFENESIN 1200 MG: 600 TABLET, EXTENDED RELEASE ORAL at 22:36

## 2022-08-23 RX ADMIN — ASPIRIN 81 MG: 81 TABLET, COATED ORAL at 11:12

## 2022-08-23 RX ADMIN — INSULIN LISPRO 6 UNITS: 100 INJECTION, SOLUTION INTRAVENOUS; SUBCUTANEOUS at 17:40

## 2022-08-23 RX ADMIN — CASTOR OIL AND BALSAM, PERU 1 APPLICATION: 788; 87 OINTMENT TOPICAL at 22:00

## 2022-08-23 RX ADMIN — Medication 10 ML: at 23:54

## 2022-08-23 RX ADMIN — ISOSORBIDE DINITRATE 20 MG: 20 TABLET ORAL at 22:37

## 2022-08-23 RX ADMIN — FERROUS SULFATE TAB 325 MG (65 MG ELEMENTAL FE) 325 MG: 325 (65 FE) TAB at 11:12

## 2022-08-23 RX ADMIN — CYCLOBENZAPRINE 5 MG: 10 TABLET, FILM COATED ORAL at 12:51

## 2022-08-23 RX ADMIN — CASTOR OIL AND BALSAM, PERU 1 APPLICATION: 788; 87 OINTMENT TOPICAL at 11:13

## 2022-08-23 RX ADMIN — INSULIN LISPRO 5 UNITS: 100 INJECTION, SOLUTION INTRAVENOUS; SUBCUTANEOUS at 12:51

## 2022-08-23 RX ADMIN — CYCLOBENZAPRINE 5 MG: 10 TABLET, FILM COATED ORAL at 05:48

## 2022-08-23 RX ADMIN — ATORVASTATIN CALCIUM 40 MG: 40 TABLET, FILM COATED ORAL at 11:12

## 2022-08-23 RX ADMIN — APIXABAN 2.5 MG: 2.5 TABLET, FILM COATED ORAL at 11:12

## 2022-08-24 ENCOUNTER — APPOINTMENT (OUTPATIENT)
Dept: GENERAL RADIOLOGY | Facility: HOSPITAL | Age: 82
End: 2022-08-24

## 2022-08-24 LAB
ALBUMIN SERPL-MCNC: 3.6 G/DL (ref 3.5–5.2)
ALBUMIN/GLOB SERPL: 1.7 G/DL
ALP SERPL-CCNC: 320 U/L (ref 39–117)
ALT SERPL W P-5'-P-CCNC: 31 U/L (ref 1–33)
ANION GAP SERPL CALCULATED.3IONS-SCNC: 14 MMOL/L (ref 5–15)
AST SERPL-CCNC: 32 U/L (ref 1–32)
BASOPHILS # BLD MANUAL: 0 10*3/MM3 (ref 0–0.2)
BASOPHILS NFR BLD MANUAL: 0 % (ref 0–1.5)
BILIRUB SERPL-MCNC: 0.6 MG/DL (ref 0–1.2)
BUN SERPL-MCNC: 77 MG/DL (ref 8–23)
BUN SERPL-MCNC: 77 MG/DL (ref 8–23)
BUN SERPL-MCNC: 78 MG/DL (ref 8–23)
BUN/CREAT SERPL: 40.7 (ref 7–25)
BUN/CREAT SERPL: 40.7 (ref 7–25)
BUN/CREAT SERPL: 43.6 (ref 7–25)
CALCIUM SPEC-SCNC: 8 MG/DL (ref 8.6–10.5)
CALCIUM SPEC-SCNC: 8.3 MG/DL (ref 8.6–10.5)
CALCIUM SPEC-SCNC: 8.3 MG/DL (ref 8.6–10.5)
CHLORIDE SERPL-SCNC: 99 MMOL/L (ref 98–107)
CO2 SERPL-SCNC: 26 MMOL/L (ref 22–29)
CREAT SERPL-MCNC: 1.79 MG/DL (ref 0.57–1)
CREAT SERPL-MCNC: 1.89 MG/DL (ref 0.57–1)
CREAT SERPL-MCNC: 1.89 MG/DL (ref 0.57–1)
D-LACTATE SERPL-SCNC: 0.7 MMOL/L (ref 0.5–2)
DEPRECATED RDW RBC AUTO: 60.2 FL (ref 37–54)
DEPRECATED RDW RBC AUTO: 61.5 FL (ref 37–54)
EGFRCR SERPLBLD CKD-EPI 2021: 26.1 ML/MIN/1.73
EGFRCR SERPLBLD CKD-EPI 2021: 26.1 ML/MIN/1.73
EGFRCR SERPLBLD CKD-EPI 2021: 27.9 ML/MIN/1.73
EOSINOPHIL # BLD MANUAL: 0.07 10*3/MM3 (ref 0–0.4)
EOSINOPHIL NFR BLD MANUAL: 1 % (ref 0.3–6.2)
ERYTHROCYTE [DISTWIDTH] IN BLOOD BY AUTOMATED COUNT: 17.8 % (ref 12.3–15.4)
ERYTHROCYTE [DISTWIDTH] IN BLOOD BY AUTOMATED COUNT: 18 % (ref 12.3–15.4)
GLOBULIN UR ELPH-MCNC: 2.1 GM/DL
GLUCOSE BLDC GLUCOMTR-MCNC: 105 MG/DL (ref 70–130)
GLUCOSE BLDC GLUCOMTR-MCNC: 158 MG/DL (ref 70–130)
GLUCOSE BLDC GLUCOMTR-MCNC: 200 MG/DL (ref 70–130)
GLUCOSE BLDC GLUCOMTR-MCNC: 220 MG/DL (ref 70–130)
GLUCOSE BLDC GLUCOMTR-MCNC: 62 MG/DL (ref 70–130)
GLUCOSE SERPL-MCNC: 51 MG/DL (ref 65–99)
GLUCOSE SERPL-MCNC: 82 MG/DL (ref 65–99)
GLUCOSE SERPL-MCNC: 82 MG/DL (ref 65–99)
HCT VFR BLD AUTO: 28.4 % (ref 34–46.6)
HCT VFR BLD AUTO: 29.8 % (ref 34–46.6)
HGB BLD-MCNC: 8.8 G/DL (ref 12–15.9)
HGB BLD-MCNC: 9 G/DL (ref 12–15.9)
LYMPHOCYTES # BLD MANUAL: 0.57 10*3/MM3 (ref 0.7–3.1)
LYMPHOCYTES NFR BLD MANUAL: 12 % (ref 5–12)
MCH RBC QN AUTO: 29 PG (ref 26.6–33)
MCH RBC QN AUTO: 29.1 PG (ref 26.6–33)
MCHC RBC AUTO-ENTMCNC: 30.2 G/DL (ref 31.5–35.7)
MCHC RBC AUTO-ENTMCNC: 31 G/DL (ref 31.5–35.7)
MCV RBC AUTO: 94 FL (ref 79–97)
MCV RBC AUTO: 96.1 FL (ref 79–97)
MONOCYTES # BLD: 0.85 10*3/MM3 (ref 0.1–0.9)
NEUTROPHILS # BLD AUTO: 5.62 10*3/MM3 (ref 1.7–7)
NEUTROPHILS NFR BLD MANUAL: 73 % (ref 42.7–76)
NEUTS BAND NFR BLD MANUAL: 6 % (ref 0–5)
PLAT MORPH BLD: NORMAL
PLATELET # BLD AUTO: 236 10*3/MM3 (ref 140–450)
PLATELET # BLD AUTO: 247 10*3/MM3 (ref 140–450)
PMV BLD AUTO: 11.8 FL (ref 6–12)
PMV BLD AUTO: 12.1 FL (ref 6–12)
POTASSIUM SERPL-SCNC: 4.3 MMOL/L (ref 3.5–5.2)
POTASSIUM SERPL-SCNC: 4.5 MMOL/L (ref 3.5–5.2)
POTASSIUM SERPL-SCNC: 4.5 MMOL/L (ref 3.5–5.2)
PROCALCITONIN SERPL-MCNC: 0.15 NG/ML (ref 0–0.25)
PROT SERPL-MCNC: 5.7 G/DL (ref 6–8.5)
RBC # BLD AUTO: 3.02 10*6/MM3 (ref 3.77–5.28)
RBC # BLD AUTO: 3.1 10*6/MM3 (ref 3.77–5.28)
RBC MORPH BLD: NORMAL
SCAN SLIDE: NORMAL
SODIUM SERPL-SCNC: 139 MMOL/L (ref 136–145)
VARIANT LYMPHS NFR BLD MANUAL: 8 % (ref 19.6–45.3)
WBC MORPH BLD: NORMAL
WBC NRBC COR # BLD: 6.54 10*3/MM3 (ref 3.4–10.8)
WBC NRBC COR # BLD: 7.11 10*3/MM3 (ref 3.4–10.8)

## 2022-08-24 PROCEDURE — 97110 THERAPEUTIC EXERCISES: CPT

## 2022-08-24 PROCEDURE — 97535 SELF CARE MNGMENT TRAINING: CPT

## 2022-08-24 PROCEDURE — 84145 PROCALCITONIN (PCT): CPT | Performed by: NURSE PRACTITIONER

## 2022-08-24 PROCEDURE — 63710000001 INSULIN LISPRO (HUMAN) PER 5 UNITS: Performed by: INTERNAL MEDICINE

## 2022-08-24 PROCEDURE — 97530 THERAPEUTIC ACTIVITIES: CPT

## 2022-08-24 PROCEDURE — 85027 COMPLETE CBC AUTOMATED: CPT | Performed by: PHYSICIAN ASSISTANT

## 2022-08-24 PROCEDURE — 83605 ASSAY OF LACTIC ACID: CPT | Performed by: NURSE PRACTITIONER

## 2022-08-24 PROCEDURE — 85025 COMPLETE CBC W/AUTO DIFF WBC: CPT | Performed by: NURSE PRACTITIONER

## 2022-08-24 PROCEDURE — 94660 CPAP INITIATION&MGMT: CPT

## 2022-08-24 PROCEDURE — 92611 MOTION FLUOROSCOPY/SWALLOW: CPT

## 2022-08-24 PROCEDURE — 94799 UNLISTED PULMONARY SVC/PX: CPT

## 2022-08-24 PROCEDURE — 99232 SBSQ HOSP IP/OBS MODERATE 35: CPT | Performed by: INTERNAL MEDICINE

## 2022-08-24 PROCEDURE — 74230 X-RAY XM SWLNG FUNCJ C+: CPT

## 2022-08-24 PROCEDURE — 99231 SBSQ HOSP IP/OBS SF/LOW 25: CPT | Performed by: PHYSICIAN ASSISTANT

## 2022-08-24 PROCEDURE — 85007 BL SMEAR W/DIFF WBC COUNT: CPT | Performed by: NURSE PRACTITIONER

## 2022-08-24 PROCEDURE — 80053 COMPREHEN METABOLIC PANEL: CPT | Performed by: PHYSICIAN ASSISTANT

## 2022-08-24 PROCEDURE — 82962 GLUCOSE BLOOD TEST: CPT

## 2022-08-24 RX ORDER — AMOXICILLIN AND CLAVULANATE POTASSIUM 875; 125 MG/1; MG/1
1 TABLET, FILM COATED ORAL EVERY 12 HOURS SCHEDULED
Status: DISCONTINUED | OUTPATIENT
Start: 2022-08-24 | End: 2022-09-03 | Stop reason: HOSPADM

## 2022-08-24 RX ORDER — PREGABALIN 50 MG/1
50 CAPSULE ORAL DAILY
Status: DISCONTINUED | OUTPATIENT
Start: 2022-08-25 | End: 2022-08-26

## 2022-08-24 RX ORDER — BUMETANIDE 0.25 MG/ML
2 INJECTION INTRAMUSCULAR; INTRAVENOUS
Status: DISCONTINUED | OUTPATIENT
Start: 2022-08-24 | End: 2022-08-26

## 2022-08-24 RX ORDER — INSULIN LISPRO 100 [IU]/ML
5 INJECTION, SOLUTION INTRAVENOUS; SUBCUTANEOUS
Status: DISCONTINUED | OUTPATIENT
Start: 2022-08-24 | End: 2022-08-24

## 2022-08-24 RX ADMIN — INSULIN LISPRO 2 UNITS: 100 INJECTION, SOLUTION INTRAVENOUS; SUBCUTANEOUS at 12:50

## 2022-08-24 RX ADMIN — GUAIFENESIN 1200 MG: 600 TABLET, EXTENDED RELEASE ORAL at 20:28

## 2022-08-24 RX ADMIN — CYCLOBENZAPRINE 5 MG: 10 TABLET, FILM COATED ORAL at 15:02

## 2022-08-24 RX ADMIN — HYDRALAZINE HYDROCHLORIDE 25 MG: 25 TABLET ORAL at 15:02

## 2022-08-24 RX ADMIN — ASPIRIN 81 MG: 81 TABLET, COATED ORAL at 11:00

## 2022-08-24 RX ADMIN — PREGABALIN 75 MG: 75 CAPSULE ORAL at 11:17

## 2022-08-24 RX ADMIN — GUAIFENESIN 1200 MG: 600 TABLET, EXTENDED RELEASE ORAL at 10:59

## 2022-08-24 RX ADMIN — AMOXICILLIN AND CLAVULANATE POTASSIUM 1 TABLET: 875; 125 TABLET, FILM COATED ORAL at 20:29

## 2022-08-24 RX ADMIN — SENNOSIDES AND DOCUSATE SODIUM 2 TABLET: 50; 8.6 TABLET ORAL at 20:29

## 2022-08-24 RX ADMIN — ISOSORBIDE DINITRATE 20 MG: 20 TABLET ORAL at 20:28

## 2022-08-24 RX ADMIN — BARIUM SULFATE 10 ML: 400 PASTE ORAL at 14:10

## 2022-08-24 RX ADMIN — BUMETANIDE 2 MG: 0.25 INJECTION INTRAMUSCULAR; INTRAVENOUS at 17:36

## 2022-08-24 RX ADMIN — Medication 10 ML: at 08:29

## 2022-08-24 RX ADMIN — Medication 15 G: at 09:51

## 2022-08-24 RX ADMIN — BARIUM SULFATE 55 ML: 0.81 POWDER, FOR SUSPENSION ORAL at 14:10

## 2022-08-24 RX ADMIN — Medication 10 ML: at 20:29

## 2022-08-24 RX ADMIN — ATORVASTATIN CALCIUM 40 MG: 40 TABLET, FILM COATED ORAL at 11:00

## 2022-08-24 RX ADMIN — CASTOR OIL AND BALSAM, PERU 1 APPLICATION: 788; 87 OINTMENT TOPICAL at 11:17

## 2022-08-24 RX ADMIN — APIXABAN 2.5 MG: 2.5 TABLET, FILM COATED ORAL at 11:00

## 2022-08-24 RX ADMIN — FERROUS SULFATE TAB 325 MG (65 MG ELEMENTAL FE) 325 MG: 325 (65 FE) TAB at 11:00

## 2022-08-24 RX ADMIN — BUMETANIDE 2 MG: 0.25 INJECTION INTRAMUSCULAR; INTRAVENOUS at 10:07

## 2022-08-24 RX ADMIN — AMOXICILLIN AND CLAVULANATE POTASSIUM 1 TABLET: 875; 125 TABLET, FILM COATED ORAL at 11:00

## 2022-08-24 RX ADMIN — INSULIN LISPRO 4 UNITS: 100 INJECTION, SOLUTION INTRAVENOUS; SUBCUTANEOUS at 17:36

## 2022-08-24 RX ADMIN — ISOSORBIDE DINITRATE 20 MG: 20 TABLET ORAL at 11:00

## 2022-08-24 RX ADMIN — BUMETANIDE 2 MG: 0.25 INJECTION INTRAMUSCULAR; INTRAVENOUS at 00:04

## 2022-08-24 RX ADMIN — SENNOSIDES AND DOCUSATE SODIUM 2 TABLET: 50; 8.6 TABLET ORAL at 11:00

## 2022-08-24 RX ADMIN — APIXABAN 2.5 MG: 2.5 TABLET, FILM COATED ORAL at 20:29

## 2022-08-24 RX ADMIN — HYDRALAZINE HYDROCHLORIDE 25 MG: 25 TABLET ORAL at 06:13

## 2022-08-24 RX ADMIN — HYDRALAZINE HYDROCHLORIDE 25 MG: 25 TABLET ORAL at 20:28

## 2022-08-25 LAB
ANION GAP SERPL CALCULATED.3IONS-SCNC: 17 MMOL/L (ref 5–15)
ARTERIAL PATENCY WRIST A: ABNORMAL
ATMOSPHERIC PRESS: ABNORMAL MM[HG]
BASE EXCESS BLDA CALC-SCNC: 3.8 MMOL/L (ref 0–2)
BDY SITE: ABNORMAL
BODY TEMPERATURE: 37 C
BUN SERPL-MCNC: 77 MG/DL (ref 8–23)
BUN/CREAT SERPL: 49.7 (ref 7–25)
CALCIUM SPEC-SCNC: 8 MG/DL (ref 8.6–10.5)
CHLORIDE SERPL-SCNC: 97 MMOL/L (ref 98–107)
CO2 BLDA-SCNC: 31.5 MMOL/L (ref 22–33)
CO2 SERPL-SCNC: 24 MMOL/L (ref 22–29)
COHGB MFR BLD: 1.5 % (ref 0–2)
CREAT SERPL-MCNC: 1.55 MG/DL (ref 0.57–1)
EGFRCR SERPLBLD CKD-EPI 2021: 33.1 ML/MIN/1.73
EPAP: 0
GLUCOSE BLDC GLUCOMTR-MCNC: 219 MG/DL (ref 70–130)
GLUCOSE BLDC GLUCOMTR-MCNC: 230 MG/DL (ref 70–130)
GLUCOSE BLDC GLUCOMTR-MCNC: 261 MG/DL (ref 70–130)
GLUCOSE SERPL-MCNC: 228 MG/DL (ref 65–99)
HCO3 BLDA-SCNC: 29.9 MMOL/L (ref 20–26)
HCT VFR BLD CALC: 27.9 % (ref 38–51)
HGB BLDA-MCNC: 9.1 G/DL (ref 14–18)
INHALED O2 CONCENTRATION: 32 %
IPAP: 0
METHGB BLD QL: 0.5 % (ref 0–1.5)
MODALITY: ABNORMAL
NOTE: ABNORMAL
OXYHGB MFR BLDV: 94.1 % (ref 94–99)
PAW @ PEAK INSP FLOW SETTING VENT: 0 CMH2O
PCO2 BLDA: 52.3 MM HG (ref 35–45)
PCO2 TEMP ADJ BLD: 52.3 MM HG (ref 35–45)
PH BLDA: 7.37 PH UNITS (ref 7.35–7.45)
PH, TEMP CORRECTED: 7.37 PH UNITS
PO2 BLDA: 77.8 MM HG (ref 83–108)
PO2 TEMP ADJ BLD: 77.8 MM HG (ref 83–108)
POTASSIUM SERPL-SCNC: 4.3 MMOL/L (ref 3.5–5.2)
SODIUM SERPL-SCNC: 138 MMOL/L (ref 136–145)
TOTAL RATE: 0 BREATHS/MINUTE

## 2022-08-25 PROCEDURE — 97597 DBRDMT OPN WND 1ST 20 CM/<: CPT

## 2022-08-25 PROCEDURE — 83050 HGB METHEMOGLOBIN QUAN: CPT

## 2022-08-25 PROCEDURE — 63710000001 INSULIN DETEMIR PER 5 UNITS: Performed by: INTERNAL MEDICINE

## 2022-08-25 PROCEDURE — 99232 SBSQ HOSP IP/OBS MODERATE 35: CPT | Performed by: INTERNAL MEDICINE

## 2022-08-25 PROCEDURE — 82805 BLOOD GASES W/O2 SATURATION: CPT

## 2022-08-25 PROCEDURE — 36600 WITHDRAWAL OF ARTERIAL BLOOD: CPT

## 2022-08-25 PROCEDURE — 63710000001 INSULIN LISPRO (HUMAN) PER 5 UNITS: Performed by: INTERNAL MEDICINE

## 2022-08-25 PROCEDURE — 82962 GLUCOSE BLOOD TEST: CPT

## 2022-08-25 PROCEDURE — 29580 STRAPPING UNNA BOOT: CPT

## 2022-08-25 PROCEDURE — 80048 BASIC METABOLIC PNL TOTAL CA: CPT | Performed by: PHYSICIAN ASSISTANT

## 2022-08-25 PROCEDURE — 82375 ASSAY CARBOXYHB QUANT: CPT

## 2022-08-25 RX ADMIN — DIAZEPAM 4 MG: 2 TABLET ORAL at 10:32

## 2022-08-25 RX ADMIN — ISOSORBIDE DINITRATE 20 MG: 20 TABLET ORAL at 09:14

## 2022-08-25 RX ADMIN — AMOXICILLIN AND CLAVULANATE POTASSIUM 1 TABLET: 875; 125 TABLET, FILM COATED ORAL at 09:15

## 2022-08-25 RX ADMIN — ISOSORBIDE DINITRATE 20 MG: 20 TABLET ORAL at 20:55

## 2022-08-25 RX ADMIN — APIXABAN 2.5 MG: 2.5 TABLET, FILM COATED ORAL at 09:14

## 2022-08-25 RX ADMIN — Medication 10 ML: at 22:29

## 2022-08-25 RX ADMIN — GUAIFENESIN 1200 MG: 600 TABLET, EXTENDED RELEASE ORAL at 09:14

## 2022-08-25 RX ADMIN — ACETAMINOPHEN 650 MG: 325 TABLET, FILM COATED ORAL at 16:02

## 2022-08-25 RX ADMIN — CYCLOBENZAPRINE 5 MG: 10 TABLET, FILM COATED ORAL at 01:48

## 2022-08-25 RX ADMIN — BUMETANIDE 2 MG: 0.25 INJECTION INTRAMUSCULAR; INTRAVENOUS at 09:14

## 2022-08-25 RX ADMIN — SENNOSIDES AND DOCUSATE SODIUM 2 TABLET: 50; 8.6 TABLET ORAL at 20:55

## 2022-08-25 RX ADMIN — HYDRALAZINE HYDROCHLORIDE 25 MG: 25 TABLET ORAL at 22:29

## 2022-08-25 RX ADMIN — CASTOR OIL AND BALSAM, PERU 1 APPLICATION: 788; 87 OINTMENT TOPICAL at 09:15

## 2022-08-25 RX ADMIN — FERROUS SULFATE TAB 325 MG (65 MG ELEMENTAL FE) 325 MG: 325 (65 FE) TAB at 09:14

## 2022-08-25 RX ADMIN — BUMETANIDE 2 MG: 0.25 INJECTION INTRAMUSCULAR; INTRAVENOUS at 17:45

## 2022-08-25 RX ADMIN — HYDRALAZINE HYDROCHLORIDE 25 MG: 25 TABLET ORAL at 14:06

## 2022-08-25 RX ADMIN — ATORVASTATIN CALCIUM 40 MG: 40 TABLET, FILM COATED ORAL at 09:14

## 2022-08-25 RX ADMIN — CYCLOBENZAPRINE 5 MG: 10 TABLET, FILM COATED ORAL at 22:28

## 2022-08-25 RX ADMIN — INSULIN LISPRO 4 UNITS: 100 INJECTION, SOLUTION INTRAVENOUS; SUBCUTANEOUS at 12:28

## 2022-08-25 RX ADMIN — AMOXICILLIN AND CLAVULANATE POTASSIUM 1 TABLET: 875; 125 TABLET, FILM COATED ORAL at 20:55

## 2022-08-25 RX ADMIN — GUAIFENESIN 1200 MG: 600 TABLET, EXTENDED RELEASE ORAL at 20:55

## 2022-08-25 RX ADMIN — DIAZEPAM 4 MG: 2 TABLET ORAL at 20:55

## 2022-08-25 RX ADMIN — SENNOSIDES AND DOCUSATE SODIUM 2 TABLET: 50; 8.6 TABLET ORAL at 09:14

## 2022-08-25 RX ADMIN — INSULIN DETEMIR 5 UNITS: 100 INJECTION, SOLUTION SUBCUTANEOUS at 09:17

## 2022-08-25 RX ADMIN — PREGABALIN 50 MG: 50 CAPSULE ORAL at 09:14

## 2022-08-25 RX ADMIN — ASPIRIN 81 MG: 81 TABLET, COATED ORAL at 09:14

## 2022-08-25 RX ADMIN — CYCLOBENZAPRINE 5 MG: 10 TABLET, FILM COATED ORAL at 14:06

## 2022-08-25 RX ADMIN — INSULIN LISPRO 4 UNITS: 100 INJECTION, SOLUTION INTRAVENOUS; SUBCUTANEOUS at 09:14

## 2022-08-25 RX ADMIN — APIXABAN 2.5 MG: 2.5 TABLET, FILM COATED ORAL at 20:55

## 2022-08-25 RX ADMIN — CASTOR OIL AND BALSAM, PERU 1 APPLICATION: 788; 87 OINTMENT TOPICAL at 22:29

## 2022-08-25 RX ADMIN — Medication 10 ML: at 09:17

## 2022-08-25 RX ADMIN — INSULIN LISPRO 6 UNITS: 100 INJECTION, SOLUTION INTRAVENOUS; SUBCUTANEOUS at 17:45

## 2022-08-26 ENCOUNTER — APPOINTMENT (OUTPATIENT)
Dept: GENERAL RADIOLOGY | Facility: HOSPITAL | Age: 82
End: 2022-08-26

## 2022-08-26 LAB
ANION GAP SERPL CALCULATED.3IONS-SCNC: 14 MMOL/L (ref 5–15)
ARTERIAL PATENCY WRIST A: ABNORMAL
ATMOSPHERIC PRESS: ABNORMAL MM[HG]
BASE EXCESS BLDA CALC-SCNC: 5.1 MMOL/L (ref 0–2)
BDY SITE: ABNORMAL
BODY TEMPERATURE: 37 C
BUN SERPL-MCNC: 75 MG/DL (ref 8–23)
BUN/CREAT SERPL: 54.3 (ref 7–25)
CALCIUM SPEC-SCNC: 8.5 MG/DL (ref 8.6–10.5)
CHLORIDE SERPL-SCNC: 96 MMOL/L (ref 98–107)
CO2 BLDA-SCNC: 33.4 MMOL/L (ref 22–33)
CO2 SERPL-SCNC: 26 MMOL/L (ref 22–29)
COHGB MFR BLD: 1.2 % (ref 0–2)
CREAT SERPL-MCNC: 1.38 MG/DL (ref 0.57–1)
CRP SERPL-MCNC: 11.59 MG/DL (ref 0–0.5)
D DIMER PPP FEU-MCNC: 2.39 MCGFEU/ML (ref 0.01–0.5)
DEPRECATED RDW RBC AUTO: 61.1 FL (ref 37–54)
EGFRCR SERPLBLD CKD-EPI 2021: 38.1 ML/MIN/1.73
EPAP: 0
ERYTHROCYTE [DISTWIDTH] IN BLOOD BY AUTOMATED COUNT: 18.1 % (ref 12.3–15.4)
ERYTHROCYTE [SEDIMENTATION RATE] IN BLOOD: 61 MM/HR (ref 0–30)
FERRITIN SERPL-MCNC: 117.6 NG/ML (ref 13–150)
GLUCOSE BLDC GLUCOMTR-MCNC: 205 MG/DL (ref 70–130)
GLUCOSE BLDC GLUCOMTR-MCNC: 211 MG/DL (ref 70–130)
GLUCOSE BLDC GLUCOMTR-MCNC: 237 MG/DL (ref 70–130)
GLUCOSE BLDC GLUCOMTR-MCNC: 268 MG/DL (ref 70–130)
GLUCOSE SERPL-MCNC: 189 MG/DL (ref 65–99)
HCO3 BLDA-SCNC: 31.6 MMOL/L (ref 20–26)
HCT VFR BLD AUTO: 30 % (ref 34–46.6)
HCT VFR BLD CALC: 29.9 % (ref 38–51)
HGB BLD-MCNC: 9.4 G/DL (ref 12–15.9)
HGB BLDA-MCNC: 9.8 G/DL (ref 14–18)
INHALED O2 CONCENTRATION: 80 %
IPAP: 0
IRON 24H UR-MRATE: 24 MCG/DL (ref 37–145)
IRON SATN MFR SERPL: 8 % (ref 20–50)
LDH SERPL-CCNC: 416 U/L (ref 135–214)
MAGNESIUM SERPL-MCNC: 2 MG/DL (ref 1.6–2.4)
MCH RBC QN AUTO: 29.2 PG (ref 26.6–33)
MCHC RBC AUTO-ENTMCNC: 31.3 G/DL (ref 31.5–35.7)
MCV RBC AUTO: 93.2 FL (ref 79–97)
METHGB BLD QL: 0.4 % (ref 0–1.5)
MODALITY: ABNORMAL
NOTE: ABNORMAL
NT-PROBNP SERPL-MCNC: 7181 PG/ML (ref 0–1800)
OXYHGB MFR BLDV: 98.5 % (ref 94–99)
PAW @ PEAK INSP FLOW SETTING VENT: 0 CMH2O
PCO2 BLDA: 56.5 MM HG (ref 35–45)
PCO2 TEMP ADJ BLD: 56.5 MM HG (ref 35–45)
PH BLDA: 7.36 PH UNITS (ref 7.35–7.45)
PH, TEMP CORRECTED: 7.36 PH UNITS
PLATELET # BLD AUTO: 217 10*3/MM3 (ref 140–450)
PMV BLD AUTO: 12.2 FL (ref 6–12)
PO2 BLDA: 190 MM HG (ref 83–108)
PO2 TEMP ADJ BLD: 190 MM HG (ref 83–108)
POTASSIUM SERPL-SCNC: 3.9 MMOL/L (ref 3.5–5.2)
PROCALCITONIN SERPL-MCNC: 0.15 NG/ML (ref 0–0.25)
RBC # BLD AUTO: 3.22 10*6/MM3 (ref 3.77–5.28)
SODIUM SERPL-SCNC: 136 MMOL/L (ref 136–145)
TIBC SERPL-MCNC: 299 MCG/DL (ref 298–536)
TOTAL RATE: 0 BREATHS/MINUTE
TRANSFERRIN SERPL-MCNC: 201 MG/DL (ref 200–360)
TROPONIN T SERPL-MCNC: 0.26 NG/ML (ref 0–0.03)
WBC NRBC COR # BLD: 7.84 10*3/MM3 (ref 3.4–10.8)

## 2022-08-26 PROCEDURE — 36600 WITHDRAWAL OF ARTERIAL BLOOD: CPT

## 2022-08-26 PROCEDURE — 82962 GLUCOSE BLOOD TEST: CPT

## 2022-08-26 PROCEDURE — 83615 LACTATE (LD) (LDH) ENZYME: CPT | Performed by: INTERNAL MEDICINE

## 2022-08-26 PROCEDURE — 82375 ASSAY CARBOXYHB QUANT: CPT

## 2022-08-26 PROCEDURE — 84145 PROCALCITONIN (PCT): CPT | Performed by: INTERNAL MEDICINE

## 2022-08-26 PROCEDURE — 85652 RBC SED RATE AUTOMATED: CPT | Performed by: INTERNAL MEDICINE

## 2022-08-26 PROCEDURE — 85379 FIBRIN DEGRADATION QUANT: CPT | Performed by: INTERNAL MEDICINE

## 2022-08-26 PROCEDURE — 63710000001 INSULIN LISPRO (HUMAN) PER 5 UNITS: Performed by: INTERNAL MEDICINE

## 2022-08-26 PROCEDURE — 97530 THERAPEUTIC ACTIVITIES: CPT

## 2022-08-26 PROCEDURE — 71045 X-RAY EXAM CHEST 1 VIEW: CPT

## 2022-08-26 PROCEDURE — 84484 ASSAY OF TROPONIN QUANT: CPT | Performed by: INTERNAL MEDICINE

## 2022-08-26 PROCEDURE — 82728 ASSAY OF FERRITIN: CPT | Performed by: INTERNAL MEDICINE

## 2022-08-26 PROCEDURE — 97535 SELF CARE MNGMENT TRAINING: CPT

## 2022-08-26 PROCEDURE — 99232 SBSQ HOSP IP/OBS MODERATE 35: CPT | Performed by: INTERNAL MEDICINE

## 2022-08-26 PROCEDURE — 82805 BLOOD GASES W/O2 SATURATION: CPT

## 2022-08-26 PROCEDURE — 84466 ASSAY OF TRANSFERRIN: CPT | Performed by: INTERNAL MEDICINE

## 2022-08-26 PROCEDURE — 83880 ASSAY OF NATRIURETIC PEPTIDE: CPT | Performed by: INTERNAL MEDICINE

## 2022-08-26 PROCEDURE — 97110 THERAPEUTIC EXERCISES: CPT

## 2022-08-26 PROCEDURE — 80048 BASIC METABOLIC PNL TOTAL CA: CPT | Performed by: INTERNAL MEDICINE

## 2022-08-26 PROCEDURE — 94799 UNLISTED PULMONARY SVC/PX: CPT

## 2022-08-26 PROCEDURE — 86140 C-REACTIVE PROTEIN: CPT | Performed by: INTERNAL MEDICINE

## 2022-08-26 PROCEDURE — 25010000002 ALBUMIN HUMAN 25% PER 50 ML: Performed by: INTERNAL MEDICINE

## 2022-08-26 PROCEDURE — 85027 COMPLETE CBC AUTOMATED: CPT | Performed by: INTERNAL MEDICINE

## 2022-08-26 PROCEDURE — 83735 ASSAY OF MAGNESIUM: CPT | Performed by: INTERNAL MEDICINE

## 2022-08-26 PROCEDURE — P9047 ALBUMIN (HUMAN), 25%, 50ML: HCPCS | Performed by: INTERNAL MEDICINE

## 2022-08-26 PROCEDURE — 83050 HGB METHEMOGLOBIN QUAN: CPT

## 2022-08-26 PROCEDURE — 83540 ASSAY OF IRON: CPT | Performed by: INTERNAL MEDICINE

## 2022-08-26 PROCEDURE — 63710000001 INSULIN DETEMIR PER 5 UNITS: Performed by: INTERNAL MEDICINE

## 2022-08-26 PROCEDURE — 93010 ELECTROCARDIOGRAM REPORT: CPT | Performed by: INTERNAL MEDICINE

## 2022-08-26 PROCEDURE — 92610 EVALUATE SWALLOWING FUNCTION: CPT

## 2022-08-26 PROCEDURE — 93005 ELECTROCARDIOGRAM TRACING: CPT | Performed by: INTERNAL MEDICINE

## 2022-08-26 PROCEDURE — 94761 N-INVAS EAR/PLS OXIMETRY MLT: CPT

## 2022-08-26 RX ORDER — BUMETANIDE 0.25 MG/ML
2 INJECTION INTRAMUSCULAR; INTRAVENOUS
Status: DISCONTINUED | OUTPATIENT
Start: 2022-08-26 | End: 2022-08-31

## 2022-08-26 RX ORDER — BUMETANIDE 0.25 MG/ML
2 INJECTION INTRAMUSCULAR; INTRAVENOUS ONCE
Status: COMPLETED | OUTPATIENT
Start: 2022-08-26 | End: 2022-08-26

## 2022-08-26 RX ORDER — SPIRONOLACTONE 25 MG/1
12.5 TABLET ORAL DAILY
Status: DISCONTINUED | OUTPATIENT
Start: 2022-08-26 | End: 2022-08-27

## 2022-08-26 RX ORDER — DILTIAZEM HCL IN NACL,ISO-OSM 125 MG/125
5-15 PLASTIC BAG, INJECTION (ML) INTRAVENOUS CONTINUOUS
Status: DISCONTINUED | OUTPATIENT
Start: 2022-08-26 | End: 2022-08-26

## 2022-08-26 RX ORDER — ALBUMIN (HUMAN) 12.5 G/50ML
25 SOLUTION INTRAVENOUS ONCE
Status: COMPLETED | OUTPATIENT
Start: 2022-08-26 | End: 2022-08-26

## 2022-08-26 RX ADMIN — INSULIN LISPRO 4 UNITS: 100 INJECTION, SOLUTION INTRAVENOUS; SUBCUTANEOUS at 12:55

## 2022-08-26 RX ADMIN — AMOXICILLIN AND CLAVULANATE POTASSIUM 1 TABLET: 875; 125 TABLET, FILM COATED ORAL at 08:50

## 2022-08-26 RX ADMIN — Medication 10 ML: at 08:48

## 2022-08-26 RX ADMIN — AMOXICILLIN AND CLAVULANATE POTASSIUM 1 TABLET: 875; 125 TABLET, FILM COATED ORAL at 21:54

## 2022-08-26 RX ADMIN — ATORVASTATIN CALCIUM 40 MG: 40 TABLET, FILM COATED ORAL at 08:50

## 2022-08-26 RX ADMIN — INSULIN LISPRO 4 UNITS: 100 INJECTION, SOLUTION INTRAVENOUS; SUBCUTANEOUS at 08:47

## 2022-08-26 RX ADMIN — BUMETANIDE 2 MG: 0.25 INJECTION INTRAMUSCULAR; INTRAVENOUS at 17:10

## 2022-08-26 RX ADMIN — ASPIRIN 81 MG: 81 TABLET, COATED ORAL at 08:50

## 2022-08-26 RX ADMIN — APIXABAN 2.5 MG: 2.5 TABLET, FILM COATED ORAL at 21:54

## 2022-08-26 RX ADMIN — INSULIN DETEMIR 10 UNITS: 100 INJECTION, SOLUTION SUBCUTANEOUS at 08:47

## 2022-08-26 RX ADMIN — SPIRONOLACTONE 12.5 MG: 25 TABLET ORAL at 12:56

## 2022-08-26 RX ADMIN — GUAIFENESIN 1200 MG: 600 TABLET, EXTENDED RELEASE ORAL at 21:54

## 2022-08-26 RX ADMIN — HYDRALAZINE HYDROCHLORIDE 25 MG: 25 TABLET ORAL at 21:54

## 2022-08-26 RX ADMIN — HYDRALAZINE HYDROCHLORIDE 25 MG: 25 TABLET ORAL at 12:56

## 2022-08-26 RX ADMIN — PREGABALIN 50 MG: 50 CAPSULE ORAL at 08:50

## 2022-08-26 RX ADMIN — BUMETANIDE 2 MG: 0.25 INJECTION INTRAMUSCULAR; INTRAVENOUS at 16:24

## 2022-08-26 RX ADMIN — BUMETANIDE 2 MG: 0.25 INJECTION INTRAMUSCULAR; INTRAVENOUS at 08:50

## 2022-08-26 RX ADMIN — ISOSORBIDE DINITRATE 20 MG: 20 TABLET ORAL at 21:54

## 2022-08-26 RX ADMIN — NITROGLYCERIN 0.5 INCH: 20 OINTMENT TOPICAL at 22:02

## 2022-08-26 RX ADMIN — BUMETANIDE 1 MG/HR: 0.25 INJECTION INTRAMUSCULAR; INTRAVENOUS at 17:39

## 2022-08-26 RX ADMIN — ISOSORBIDE DINITRATE 20 MG: 20 TABLET ORAL at 08:50

## 2022-08-26 RX ADMIN — CASTOR OIL AND BALSAM, PERU 1 APPLICATION: 788; 87 OINTMENT TOPICAL at 08:51

## 2022-08-26 RX ADMIN — DIAZEPAM 4 MG: 2 TABLET ORAL at 09:54

## 2022-08-26 RX ADMIN — GUAIFENESIN 1200 MG: 600 TABLET, EXTENDED RELEASE ORAL at 08:50

## 2022-08-26 RX ADMIN — CYCLOBENZAPRINE 5 MG: 10 TABLET, FILM COATED ORAL at 09:54

## 2022-08-26 RX ADMIN — SENNOSIDES AND DOCUSATE SODIUM 2 TABLET: 50; 8.6 TABLET ORAL at 21:54

## 2022-08-26 RX ADMIN — FERROUS SULFATE TAB 325 MG (65 MG ELEMENTAL FE) 325 MG: 325 (65 FE) TAB at 08:50

## 2022-08-26 RX ADMIN — ALBUMIN HUMAN 25 G: 0.25 SOLUTION INTRAVENOUS at 16:26

## 2022-08-26 RX ADMIN — APIXABAN 2.5 MG: 2.5 TABLET, FILM COATED ORAL at 08:50

## 2022-08-27 ENCOUNTER — APPOINTMENT (OUTPATIENT)
Dept: CARDIOLOGY | Facility: HOSPITAL | Age: 82
End: 2022-08-27

## 2022-08-27 LAB
ALBUMIN SERPL-MCNC: 3 G/DL (ref 3.5–5.2)
ANION GAP SERPL CALCULATED.3IONS-SCNC: 13 MMOL/L (ref 5–15)
BASOPHILS # BLD MANUAL: 0.09 10*3/MM3 (ref 0–0.2)
BASOPHILS NFR BLD MANUAL: 1 % (ref 0–1.5)
BH CV ECHO MEAS - AO MAX PG: 12.3 MMHG
BH CV ECHO MEAS - AO MEAN PG: 6 MMHG
BH CV ECHO MEAS - AO ROOT DIAM: 2.7 CM
BH CV ECHO MEAS - AO V2 MAX: 175 CM/SEC
BH CV ECHO MEAS - AO V2 VTI: 38.1 CM
BH CV ECHO MEAS - AVA(I,D): 1.68 CM2
BH CV ECHO MEAS - EDV(CUBED): 125 ML
BH CV ECHO MEAS - EDV(MOD-SP2): 109 ML
BH CV ECHO MEAS - EDV(MOD-SP4): 98.8 ML
BH CV ECHO MEAS - EF(MOD-BP): 50.4 %
BH CV ECHO MEAS - EF(MOD-SP2): 54.6 %
BH CV ECHO MEAS - EF(MOD-SP4): 45.7 %
BH CV ECHO MEAS - ESV(CUBED): 59.3 ML
BH CV ECHO MEAS - ESV(MOD-SP2): 49.5 ML
BH CV ECHO MEAS - ESV(MOD-SP4): 53.6 ML
BH CV ECHO MEAS - FS: 22 %
BH CV ECHO MEAS - IVS/LVPW: 1.1 CM
BH CV ECHO MEAS - IVSD: 1.1 CM
BH CV ECHO MEAS - LA DIMENSION: 3.5 CM
BH CV ECHO MEAS - LAT PEAK E' VEL: 19.6 CM/SEC
BH CV ECHO MEAS - LV MASS(C)D: 194.4 GRAMS
BH CV ECHO MEAS - LV MAX PG: 2.5 MMHG
BH CV ECHO MEAS - LV MEAN PG: 1 MMHG
BH CV ECHO MEAS - LV V1 MAX: 79.1 CM/SEC
BH CV ECHO MEAS - LV V1 VTI: 20.4 CM
BH CV ECHO MEAS - LVIDD: 5 CM
BH CV ECHO MEAS - LVIDS: 3.9 CM
BH CV ECHO MEAS - LVOT AREA: 3.1 CM2
BH CV ECHO MEAS - LVOT DIAM: 2 CM
BH CV ECHO MEAS - LVPWD: 1 CM
BH CV ECHO MEAS - MED PEAK E' VEL: 9.7 CM/SEC
BH CV ECHO MEAS - MV A MAX VEL: 113 CM/SEC
BH CV ECHO MEAS - MV DEC TIME: 0.17 MSEC
BH CV ECHO MEAS - MV E MAX VEL: 107 CM/SEC
BH CV ECHO MEAS - MV E/A: 0.95
BH CV ECHO MEAS - PA ACC TIME: 0.11 SEC
BH CV ECHO MEAS - PA PR(ACCEL): 28.2 MMHG
BH CV ECHO MEAS - PA V2 MAX: 122 CM/SEC
BH CV ECHO MEAS - RAP SYSTOLE: 3 MMHG
BH CV ECHO MEAS - RVSP: 11 MMHG
BH CV ECHO MEAS - SV(LVOT): 64.1 ML
BH CV ECHO MEAS - SV(MOD-SP2): 59.5 ML
BH CV ECHO MEAS - SV(MOD-SP4): 45.2 ML
BH CV ECHO MEAS - TAPSE (>1.6): 1.98 CM
BH CV ECHO MEAS - TR MAX PG: 7.6 MMHG
BH CV ECHO MEAS - TR MAX VEL: 138 CM/SEC
BH CV ECHO MEASUREMENTS AVERAGE E/E' RATIO: 7.3
BH CV VAS BP LEFT ARM: NORMAL MMHG
BH CV XLRA - RV BASE: 2.9 CM
BH CV XLRA - RV LENGTH: 5.9 CM
BH CV XLRA - RV MID: 2 CM
BH CV XLRA - TDI S': 15.3 CM/SEC
BUN SERPL-MCNC: 78 MG/DL (ref 8–23)
BUN/CREAT SERPL: 56.5 (ref 7–25)
BURR CELLS BLD QL SMEAR: ABNORMAL
CALCIUM SPEC-SCNC: 8.8 MG/DL (ref 8.6–10.5)
CHLORIDE SERPL-SCNC: 98 MMOL/L (ref 98–107)
CO2 SERPL-SCNC: 27 MMOL/L (ref 22–29)
CREAT SERPL-MCNC: 1.38 MG/DL (ref 0.57–1)
D-LACTATE SERPL-SCNC: 0.9 MMOL/L (ref 0.5–2)
DEPRECATED RDW RBC AUTO: 62.2 FL (ref 37–54)
EGFRCR SERPLBLD CKD-EPI 2021: 38.1 ML/MIN/1.73
EOSINOPHIL # BLD MANUAL: 0.09 10*3/MM3 (ref 0–0.4)
EOSINOPHIL NFR BLD MANUAL: 1 % (ref 0.3–6.2)
ERYTHROCYTE [DISTWIDTH] IN BLOOD BY AUTOMATED COUNT: 18 % (ref 12.3–15.4)
GLUCOSE BLDC GLUCOMTR-MCNC: 223 MG/DL (ref 70–130)
GLUCOSE BLDC GLUCOMTR-MCNC: 306 MG/DL (ref 70–130)
GLUCOSE BLDC GLUCOMTR-MCNC: 318 MG/DL (ref 70–130)
GLUCOSE SERPL-MCNC: 293 MG/DL (ref 65–99)
HCT VFR BLD AUTO: 29.7 % (ref 34–46.6)
HGB BLD-MCNC: 9.1 G/DL (ref 12–15.9)
LEFT ATRIUM VOLUME INDEX: 22.5 ML/M2
LV EF 2D ECHO EST: 55 %
LYMPHOCYTES # BLD MANUAL: 0.71 10*3/MM3 (ref 0.7–3.1)
LYMPHOCYTES NFR BLD MANUAL: 2 % (ref 5–12)
MAXIMAL PREDICTED HEART RATE: 137 BPM
MCH RBC QN AUTO: 29 PG (ref 26.6–33)
MCHC RBC AUTO-ENTMCNC: 30.6 G/DL (ref 31.5–35.7)
MCV RBC AUTO: 94.6 FL (ref 79–97)
MONOCYTES # BLD: 0.18 10*3/MM3 (ref 0.1–0.9)
NEUTROPHILS # BLD AUTO: 7.76 10*3/MM3 (ref 1.7–7)
NEUTROPHILS NFR BLD MANUAL: 88 % (ref 42.7–76)
OVALOCYTES BLD QL SMEAR: ABNORMAL
PHOSPHATE SERPL-MCNC: 3.7 MG/DL (ref 2.5–4.5)
PLAT MORPH BLD: NORMAL
PLATELET # BLD AUTO: 195 10*3/MM3 (ref 140–450)
PMV BLD AUTO: 12.1 FL (ref 6–12)
POTASSIUM SERPL-SCNC: 4 MMOL/L (ref 3.5–5.2)
RBC # BLD AUTO: 3.14 10*6/MM3 (ref 3.77–5.28)
SODIUM SERPL-SCNC: 138 MMOL/L (ref 136–145)
STRESS TARGET HR: 116 BPM
VARIANT LYMPHS NFR BLD MANUAL: 8 % (ref 19.6–45.3)
WBC MORPH BLD: NORMAL
WBC NRBC COR # BLD: 8.82 10*3/MM3 (ref 3.4–10.8)

## 2022-08-27 PROCEDURE — 80069 RENAL FUNCTION PANEL: CPT | Performed by: INTERNAL MEDICINE

## 2022-08-27 PROCEDURE — 82962 GLUCOSE BLOOD TEST: CPT

## 2022-08-27 PROCEDURE — 25010000002 ALBUMIN HUMAN 25% PER 50 ML: Performed by: INTERNAL MEDICINE

## 2022-08-27 PROCEDURE — 93306 TTE W/DOPPLER COMPLETE: CPT | Performed by: INTERNAL MEDICINE

## 2022-08-27 PROCEDURE — 63710000001 INSULIN DETEMIR PER 5 UNITS: Performed by: INTERNAL MEDICINE

## 2022-08-27 PROCEDURE — 87040 BLOOD CULTURE FOR BACTERIA: CPT | Performed by: INTERNAL MEDICINE

## 2022-08-27 PROCEDURE — 99233 SBSQ HOSP IP/OBS HIGH 50: CPT | Performed by: INTERNAL MEDICINE

## 2022-08-27 PROCEDURE — 85025 COMPLETE CBC W/AUTO DIFF WBC: CPT | Performed by: INTERNAL MEDICINE

## 2022-08-27 PROCEDURE — 63710000001 INSULIN LISPRO (HUMAN) PER 5 UNITS: Performed by: INTERNAL MEDICINE

## 2022-08-27 PROCEDURE — 83605 ASSAY OF LACTIC ACID: CPT | Performed by: INTERNAL MEDICINE

## 2022-08-27 PROCEDURE — 85007 BL SMEAR W/DIFF WBC COUNT: CPT | Performed by: INTERNAL MEDICINE

## 2022-08-27 PROCEDURE — P9047 ALBUMIN (HUMAN), 25%, 50ML: HCPCS | Performed by: INTERNAL MEDICINE

## 2022-08-27 PROCEDURE — 93306 TTE W/DOPPLER COMPLETE: CPT

## 2022-08-27 RX ORDER — METOLAZONE 5 MG/1
5 TABLET ORAL ONCE
Status: COMPLETED | OUTPATIENT
Start: 2022-08-27 | End: 2022-08-27

## 2022-08-27 RX ORDER — SPIRONOLACTONE 25 MG/1
25 TABLET ORAL DAILY
Status: DISCONTINUED | OUTPATIENT
Start: 2022-08-28 | End: 2022-08-29

## 2022-08-27 RX ORDER — ALBUMIN (HUMAN) 12.5 G/50ML
25 SOLUTION INTRAVENOUS ONCE
Status: COMPLETED | OUTPATIENT
Start: 2022-08-27 | End: 2022-08-27

## 2022-08-27 RX ADMIN — ISOSORBIDE DINITRATE 20 MG: 20 TABLET ORAL at 22:57

## 2022-08-27 RX ADMIN — ISOSORBIDE DINITRATE 20 MG: 20 TABLET ORAL at 09:05

## 2022-08-27 RX ADMIN — CASTOR OIL AND BALSAM, PERU 1 APPLICATION: 788; 87 OINTMENT TOPICAL at 09:26

## 2022-08-27 RX ADMIN — INSULIN LISPRO 7 UNITS: 100 INJECTION, SOLUTION INTRAVENOUS; SUBCUTANEOUS at 12:45

## 2022-08-27 RX ADMIN — BUMETANIDE 2 MG: 0.25 INJECTION INTRAMUSCULAR; INTRAVENOUS at 18:28

## 2022-08-27 RX ADMIN — GUAIFENESIN 1200 MG: 600 TABLET, EXTENDED RELEASE ORAL at 09:04

## 2022-08-27 RX ADMIN — INSULIN LISPRO 7 UNITS: 100 INJECTION, SOLUTION INTRAVENOUS; SUBCUTANEOUS at 09:05

## 2022-08-27 RX ADMIN — Medication 10 ML: at 22:58

## 2022-08-27 RX ADMIN — METOLAZONE 5 MG: 5 TABLET ORAL at 12:46

## 2022-08-27 RX ADMIN — APIXABAN 2.5 MG: 2.5 TABLET, FILM COATED ORAL at 22:57

## 2022-08-27 RX ADMIN — AMOXICILLIN AND CLAVULANATE POTASSIUM 1 TABLET: 875; 125 TABLET, FILM COATED ORAL at 22:57

## 2022-08-27 RX ADMIN — HYDRALAZINE HYDROCHLORIDE 25 MG: 25 TABLET ORAL at 22:58

## 2022-08-27 RX ADMIN — SENNOSIDES AND DOCUSATE SODIUM 2 TABLET: 50; 8.6 TABLET ORAL at 22:57

## 2022-08-27 RX ADMIN — AMOXICILLIN AND CLAVULANATE POTASSIUM 1 TABLET: 875; 125 TABLET, FILM COATED ORAL at 09:04

## 2022-08-27 RX ADMIN — INSULIN DETEMIR 10 UNITS: 100 INJECTION, SOLUTION SUBCUTANEOUS at 09:05

## 2022-08-27 RX ADMIN — ATORVASTATIN CALCIUM 40 MG: 40 TABLET, FILM COATED ORAL at 09:04

## 2022-08-27 RX ADMIN — FERROUS SULFATE TAB 325 MG (65 MG ELEMENTAL FE) 325 MG: 325 (65 FE) TAB at 09:04

## 2022-08-27 RX ADMIN — BUMETANIDE 2 MG: 0.25 INJECTION INTRAMUSCULAR; INTRAVENOUS at 09:03

## 2022-08-27 RX ADMIN — CASTOR OIL AND BALSAM, PERU 1 APPLICATION: 788; 87 OINTMENT TOPICAL at 22:56

## 2022-08-27 RX ADMIN — INSULIN DETEMIR 5 UNITS: 100 INJECTION, SOLUTION SUBCUTANEOUS at 15:03

## 2022-08-27 RX ADMIN — HYDRALAZINE HYDROCHLORIDE 25 MG: 25 TABLET ORAL at 12:45

## 2022-08-27 RX ADMIN — GUAIFENESIN 1200 MG: 600 TABLET, EXTENDED RELEASE ORAL at 22:57

## 2022-08-27 RX ADMIN — INSULIN LISPRO 4 UNITS: 100 INJECTION, SOLUTION INTRAVENOUS; SUBCUTANEOUS at 18:30

## 2022-08-27 RX ADMIN — Medication 10 ML: at 09:03

## 2022-08-27 RX ADMIN — SPIRONOLACTONE 12.5 MG: 25 TABLET ORAL at 09:05

## 2022-08-27 RX ADMIN — HYDRALAZINE HYDROCHLORIDE 25 MG: 25 TABLET ORAL at 05:56

## 2022-08-27 RX ADMIN — ASPIRIN 81 MG: 81 TABLET, COATED ORAL at 09:05

## 2022-08-27 RX ADMIN — SENNOSIDES AND DOCUSATE SODIUM 2 TABLET: 50; 8.6 TABLET ORAL at 09:04

## 2022-08-27 RX ADMIN — ALBUMIN HUMAN 25 G: 0.25 SOLUTION INTRAVENOUS at 13:02

## 2022-08-27 RX ADMIN — APIXABAN 2.5 MG: 2.5 TABLET, FILM COATED ORAL at 09:05

## 2022-08-28 LAB
ALBUMIN SERPL-MCNC: 2.9 G/DL (ref 3.5–5.2)
ANION GAP SERPL CALCULATED.3IONS-SCNC: 14 MMOL/L (ref 5–15)
BUN SERPL-MCNC: 79 MG/DL (ref 8–23)
BUN/CREAT SERPL: 53.4 (ref 7–25)
CALCIUM SPEC-SCNC: 8.8 MG/DL (ref 8.6–10.5)
CHLORIDE SERPL-SCNC: 100 MMOL/L (ref 98–107)
CO2 SERPL-SCNC: 29 MMOL/L (ref 22–29)
CREAT SERPL-MCNC: 1.48 MG/DL (ref 0.57–1)
DEPRECATED RDW RBC AUTO: 60.8 FL (ref 37–54)
EGFRCR SERPLBLD CKD-EPI 2021: 35 ML/MIN/1.73
ERYTHROCYTE [DISTWIDTH] IN BLOOD BY AUTOMATED COUNT: 18.1 % (ref 12.3–15.4)
GLUCOSE BLDC GLUCOMTR-MCNC: 133 MG/DL (ref 70–130)
GLUCOSE BLDC GLUCOMTR-MCNC: 151 MG/DL (ref 70–130)
GLUCOSE BLDC GLUCOMTR-MCNC: 170 MG/DL (ref 70–130)
GLUCOSE BLDC GLUCOMTR-MCNC: 177 MG/DL (ref 70–130)
GLUCOSE SERPL-MCNC: 136 MG/DL (ref 65–99)
HCT VFR BLD AUTO: 28.9 % (ref 34–46.6)
HGB BLD-MCNC: 9 G/DL (ref 12–15.9)
MCH RBC QN AUTO: 29.1 PG (ref 26.6–33)
MCHC RBC AUTO-ENTMCNC: 31.1 G/DL (ref 31.5–35.7)
MCV RBC AUTO: 93.5 FL (ref 79–97)
PHOSPHATE SERPL-MCNC: 3.5 MG/DL (ref 2.5–4.5)
PLATELET # BLD AUTO: 198 10*3/MM3 (ref 140–450)
PMV BLD AUTO: 12.5 FL (ref 6–12)
POTASSIUM SERPL-SCNC: 3.3 MMOL/L (ref 3.5–5.2)
RBC # BLD AUTO: 3.09 10*6/MM3 (ref 3.77–5.28)
SODIUM SERPL-SCNC: 143 MMOL/L (ref 136–145)
WBC NRBC COR # BLD: 7.26 10*3/MM3 (ref 3.4–10.8)

## 2022-08-28 PROCEDURE — 85027 COMPLETE CBC AUTOMATED: CPT | Performed by: INTERNAL MEDICINE

## 2022-08-28 PROCEDURE — 63710000001 INSULIN DETEMIR PER 5 UNITS: Performed by: INTERNAL MEDICINE

## 2022-08-28 PROCEDURE — 29580 STRAPPING UNNA BOOT: CPT

## 2022-08-28 PROCEDURE — 97597 DBRDMT OPN WND 1ST 20 CM/<: CPT

## 2022-08-28 PROCEDURE — P9047 ALBUMIN (HUMAN), 25%, 50ML: HCPCS | Performed by: INTERNAL MEDICINE

## 2022-08-28 PROCEDURE — 63710000001 INSULIN LISPRO (HUMAN) PER 5 UNITS: Performed by: INTERNAL MEDICINE

## 2022-08-28 PROCEDURE — 99232 SBSQ HOSP IP/OBS MODERATE 35: CPT | Performed by: INTERNAL MEDICINE

## 2022-08-28 PROCEDURE — 25010000002 ALBUMIN HUMAN 25% PER 50 ML: Performed by: INTERNAL MEDICINE

## 2022-08-28 PROCEDURE — 80069 RENAL FUNCTION PANEL: CPT | Performed by: INTERNAL MEDICINE

## 2022-08-28 PROCEDURE — 82962 GLUCOSE BLOOD TEST: CPT

## 2022-08-28 RX ORDER — METOLAZONE 2.5 MG/1
2.5 TABLET ORAL ONCE
Status: COMPLETED | OUTPATIENT
Start: 2022-08-28 | End: 2022-08-28

## 2022-08-28 RX ORDER — ALBUMIN (HUMAN) 12.5 G/50ML
25 SOLUTION INTRAVENOUS ONCE
Status: COMPLETED | OUTPATIENT
Start: 2022-08-28 | End: 2022-08-28

## 2022-08-28 RX ORDER — POTASSIUM CHLORIDE 750 MG/1
40 CAPSULE, EXTENDED RELEASE ORAL ONCE
Status: COMPLETED | OUTPATIENT
Start: 2022-08-28 | End: 2022-08-28

## 2022-08-28 RX ADMIN — CASTOR OIL AND BALSAM, PERU 1 APPLICATION: 788; 87 OINTMENT TOPICAL at 21:24

## 2022-08-28 RX ADMIN — GUAIFENESIN 1200 MG: 600 TABLET, EXTENDED RELEASE ORAL at 08:54

## 2022-08-28 RX ADMIN — APIXABAN 2.5 MG: 2.5 TABLET, FILM COATED ORAL at 21:24

## 2022-08-28 RX ADMIN — AMOXICILLIN AND CLAVULANATE POTASSIUM 1 TABLET: 875; 125 TABLET, FILM COATED ORAL at 08:54

## 2022-08-28 RX ADMIN — HYDRALAZINE HYDROCHLORIDE 25 MG: 25 TABLET ORAL at 14:27

## 2022-08-28 RX ADMIN — BUMETANIDE 2 MG: 0.25 INJECTION INTRAMUSCULAR; INTRAVENOUS at 08:55

## 2022-08-28 RX ADMIN — POTASSIUM CHLORIDE 40 MEQ: 750 CAPSULE, EXTENDED RELEASE ORAL at 12:20

## 2022-08-28 RX ADMIN — BUMETANIDE 2 MG: 0.25 INJECTION INTRAMUSCULAR; INTRAVENOUS at 17:53

## 2022-08-28 RX ADMIN — HYDRALAZINE HYDROCHLORIDE 25 MG: 25 TABLET ORAL at 21:24

## 2022-08-28 RX ADMIN — INSULIN DETEMIR 15 UNITS: 100 INJECTION, SOLUTION SUBCUTANEOUS at 08:53

## 2022-08-28 RX ADMIN — METOLAZONE 2.5 MG: 2.5 TABLET ORAL at 12:20

## 2022-08-28 RX ADMIN — APIXABAN 2.5 MG: 2.5 TABLET, FILM COATED ORAL at 08:55

## 2022-08-28 RX ADMIN — CASTOR OIL AND BALSAM, PERU 1 APPLICATION: 788; 87 OINTMENT TOPICAL at 08:58

## 2022-08-28 RX ADMIN — GUAIFENESIN 1200 MG: 600 TABLET, EXTENDED RELEASE ORAL at 21:24

## 2022-08-28 RX ADMIN — ISOSORBIDE DINITRATE 20 MG: 20 TABLET ORAL at 08:54

## 2022-08-28 RX ADMIN — ATORVASTATIN CALCIUM 40 MG: 40 TABLET, FILM COATED ORAL at 08:55

## 2022-08-28 RX ADMIN — Medication 10 ML: at 21:25

## 2022-08-28 RX ADMIN — AMOXICILLIN AND CLAVULANATE POTASSIUM 1 TABLET: 875; 125 TABLET, FILM COATED ORAL at 21:24

## 2022-08-28 RX ADMIN — HYDRALAZINE HYDROCHLORIDE 25 MG: 25 TABLET ORAL at 06:28

## 2022-08-28 RX ADMIN — ISOSORBIDE DINITRATE 20 MG: 20 TABLET ORAL at 21:24

## 2022-08-28 RX ADMIN — Medication 10 ML: at 08:59

## 2022-08-28 RX ADMIN — INSULIN LISPRO 2 UNITS: 100 INJECTION, SOLUTION INTRAVENOUS; SUBCUTANEOUS at 12:20

## 2022-08-28 RX ADMIN — ASPIRIN 81 MG: 81 TABLET, COATED ORAL at 08:54

## 2022-08-28 RX ADMIN — SENNOSIDES AND DOCUSATE SODIUM 2 TABLET: 50; 8.6 TABLET ORAL at 08:54

## 2022-08-28 RX ADMIN — ALBUMIN HUMAN 25 G: 0.25 SOLUTION INTRAVENOUS at 12:20

## 2022-08-28 RX ADMIN — SPIRONOLACTONE 25 MG: 25 TABLET ORAL at 08:54

## 2022-08-28 RX ADMIN — INSULIN LISPRO 2 UNITS: 100 INJECTION, SOLUTION INTRAVENOUS; SUBCUTANEOUS at 17:53

## 2022-08-28 RX ADMIN — SENNOSIDES AND DOCUSATE SODIUM 2 TABLET: 50; 8.6 TABLET ORAL at 21:25

## 2022-08-28 RX ADMIN — FERROUS SULFATE TAB 325 MG (65 MG ELEMENTAL FE) 325 MG: 325 (65 FE) TAB at 08:54

## 2022-08-29 LAB
ANION GAP SERPL CALCULATED.3IONS-SCNC: 17 MMOL/L (ref 5–15)
BUN SERPL-MCNC: 83 MG/DL (ref 8–23)
BUN/CREAT SERPL: 61.9 (ref 7–25)
CALCIUM SPEC-SCNC: 9.3 MG/DL (ref 8.6–10.5)
CHLORIDE SERPL-SCNC: 96 MMOL/L (ref 98–107)
CO2 SERPL-SCNC: 29 MMOL/L (ref 22–29)
CREAT SERPL-MCNC: 1.34 MG/DL (ref 0.57–1)
EGFRCR SERPLBLD CKD-EPI 2021: 39.4 ML/MIN/1.73
GLUCOSE BLDC GLUCOMTR-MCNC: 138 MG/DL (ref 70–130)
GLUCOSE BLDC GLUCOMTR-MCNC: 161 MG/DL (ref 70–130)
GLUCOSE BLDC GLUCOMTR-MCNC: 166 MG/DL (ref 70–130)
GLUCOSE SERPL-MCNC: 135 MG/DL (ref 65–99)
POTASSIUM SERPL-SCNC: 3.8 MMOL/L (ref 3.5–5.2)
QT INTERVAL: 408 MS
QTC INTERVAL: 536 MS
SODIUM SERPL-SCNC: 142 MMOL/L (ref 136–145)

## 2022-08-29 PROCEDURE — 80048 BASIC METABOLIC PNL TOTAL CA: CPT | Performed by: INTERNAL MEDICINE

## 2022-08-29 PROCEDURE — 97530 THERAPEUTIC ACTIVITIES: CPT

## 2022-08-29 PROCEDURE — 99232 SBSQ HOSP IP/OBS MODERATE 35: CPT | Performed by: INTERNAL MEDICINE

## 2022-08-29 PROCEDURE — 82962 GLUCOSE BLOOD TEST: CPT

## 2022-08-29 PROCEDURE — 25010000002 HYDRALAZINE PER 20 MG

## 2022-08-29 PROCEDURE — 63710000001 INSULIN DETEMIR PER 5 UNITS: Performed by: INTERNAL MEDICINE

## 2022-08-29 PROCEDURE — 92526 ORAL FUNCTION THERAPY: CPT

## 2022-08-29 PROCEDURE — 94799 UNLISTED PULMONARY SVC/PX: CPT

## 2022-08-29 PROCEDURE — 63710000001 INSULIN LISPRO (HUMAN) PER 5 UNITS: Performed by: INTERNAL MEDICINE

## 2022-08-29 RX ORDER — HYDRALAZINE HYDROCHLORIDE 20 MG/ML
10 INJECTION INTRAMUSCULAR; INTRAVENOUS ONCE
Status: COMPLETED | OUTPATIENT
Start: 2022-08-29 | End: 2022-08-29

## 2022-08-29 RX ORDER — OXYCODONE HYDROCHLORIDE AND ACETAMINOPHEN 5; 325 MG/1; MG/1
1 TABLET ORAL EVERY 8 HOURS PRN
Status: DISCONTINUED | OUTPATIENT
Start: 2022-08-29 | End: 2022-08-30

## 2022-08-29 RX ORDER — SPIRONOLACTONE 25 MG/1
50 TABLET ORAL DAILY
Status: DISCONTINUED | OUTPATIENT
Start: 2022-08-30 | End: 2022-09-03 | Stop reason: HOSPADM

## 2022-08-29 RX ADMIN — SENNOSIDES AND DOCUSATE SODIUM 2 TABLET: 50; 8.6 TABLET ORAL at 09:34

## 2022-08-29 RX ADMIN — CASTOR OIL AND BALSAM, PERU 1 APPLICATION: 788; 87 OINTMENT TOPICAL at 23:52

## 2022-08-29 RX ADMIN — GUAIFENESIN 1200 MG: 600 TABLET, EXTENDED RELEASE ORAL at 20:32

## 2022-08-29 RX ADMIN — HYDRALAZINE HYDROCHLORIDE 25 MG: 25 TABLET ORAL at 15:24

## 2022-08-29 RX ADMIN — GUAIFENESIN 1200 MG: 600 TABLET, EXTENDED RELEASE ORAL at 09:34

## 2022-08-29 RX ADMIN — SPIRONOLACTONE 25 MG: 25 TABLET ORAL at 09:34

## 2022-08-29 RX ADMIN — CASTOR OIL AND BALSAM, PERU 1 APPLICATION: 788; 87 OINTMENT TOPICAL at 09:36

## 2022-08-29 RX ADMIN — ISOSORBIDE DINITRATE 20 MG: 20 TABLET ORAL at 20:31

## 2022-08-29 RX ADMIN — INSULIN LISPRO 2 UNITS: 100 INJECTION, SOLUTION INTRAVENOUS; SUBCUTANEOUS at 09:34

## 2022-08-29 RX ADMIN — BUMETANIDE 2 MG: 0.25 INJECTION INTRAMUSCULAR; INTRAVENOUS at 09:34

## 2022-08-29 RX ADMIN — OXYCODONE HYDROCHLORIDE AND ACETAMINOPHEN 1 TABLET: 5; 325 TABLET ORAL at 12:17

## 2022-08-29 RX ADMIN — AMOXICILLIN AND CLAVULANATE POTASSIUM 1 TABLET: 875; 125 TABLET, FILM COATED ORAL at 20:32

## 2022-08-29 RX ADMIN — APIXABAN 2.5 MG: 2.5 TABLET, FILM COATED ORAL at 20:32

## 2022-08-29 RX ADMIN — Medication 10 ML: at 09:36

## 2022-08-29 RX ADMIN — HYDRALAZINE HYDROCHLORIDE 10 MG: 20 INJECTION INTRAMUSCULAR; INTRAVENOUS at 03:17

## 2022-08-29 RX ADMIN — ASPIRIN 81 MG: 81 TABLET, COATED ORAL at 09:34

## 2022-08-29 RX ADMIN — APIXABAN 2.5 MG: 2.5 TABLET, FILM COATED ORAL at 09:34

## 2022-08-29 RX ADMIN — ISOSORBIDE DINITRATE 20 MG: 20 TABLET ORAL at 09:34

## 2022-08-29 RX ADMIN — HYDRALAZINE HYDROCHLORIDE 25 MG: 25 TABLET ORAL at 23:51

## 2022-08-29 RX ADMIN — AMOXICILLIN AND CLAVULANATE POTASSIUM 1 TABLET: 875; 125 TABLET, FILM COATED ORAL at 09:34

## 2022-08-29 RX ADMIN — HYDRALAZINE HYDROCHLORIDE 25 MG: 25 TABLET ORAL at 05:21

## 2022-08-29 RX ADMIN — Medication 10 ML: at 23:52

## 2022-08-29 RX ADMIN — BUMETANIDE 2 MG: 0.25 INJECTION INTRAMUSCULAR; INTRAVENOUS at 17:16

## 2022-08-29 RX ADMIN — SENNOSIDES AND DOCUSATE SODIUM 2 TABLET: 50; 8.6 TABLET ORAL at 20:32

## 2022-08-29 RX ADMIN — INSULIN DETEMIR 15 UNITS: 100 INJECTION, SOLUTION SUBCUTANEOUS at 09:35

## 2022-08-29 RX ADMIN — FERROUS SULFATE TAB 325 MG (65 MG ELEMENTAL FE) 325 MG: 325 (65 FE) TAB at 09:34

## 2022-08-29 RX ADMIN — INSULIN LISPRO 2 UNITS: 100 INJECTION, SOLUTION INTRAVENOUS; SUBCUTANEOUS at 11:25

## 2022-08-29 RX ADMIN — ATORVASTATIN CALCIUM 40 MG: 40 TABLET, FILM COATED ORAL at 09:34

## 2022-08-29 RX ADMIN — OXYCODONE HYDROCHLORIDE AND ACETAMINOPHEN 1 TABLET: 5; 325 TABLET ORAL at 20:32

## 2022-08-30 LAB
ANION GAP SERPL CALCULATED.3IONS-SCNC: 15 MMOL/L (ref 5–15)
BUN SERPL-MCNC: 82 MG/DL (ref 8–23)
BUN/CREAT SERPL: 61.7 (ref 7–25)
CALCIUM SPEC-SCNC: 9.3 MG/DL (ref 8.6–10.5)
CHLORIDE SERPL-SCNC: 93 MMOL/L (ref 98–107)
CO2 SERPL-SCNC: 33 MMOL/L (ref 22–29)
CREAT SERPL-MCNC: 1.33 MG/DL (ref 0.57–1)
EGFRCR SERPLBLD CKD-EPI 2021: 39.8 ML/MIN/1.73
GLUCOSE BLDC GLUCOMTR-MCNC: 139 MG/DL (ref 70–130)
GLUCOSE BLDC GLUCOMTR-MCNC: 148 MG/DL (ref 70–130)
GLUCOSE BLDC GLUCOMTR-MCNC: 177 MG/DL (ref 70–130)
GLUCOSE SERPL-MCNC: 143 MG/DL (ref 65–99)
MAGNESIUM SERPL-MCNC: 1.8 MG/DL (ref 1.6–2.4)
POTASSIUM SERPL-SCNC: 3 MMOL/L (ref 3.5–5.2)
SODIUM SERPL-SCNC: 141 MMOL/L (ref 136–145)

## 2022-08-30 PROCEDURE — 94799 UNLISTED PULMONARY SVC/PX: CPT

## 2022-08-30 PROCEDURE — 63710000001 DIPHENHYDRAMINE PER 50 MG: Performed by: INTERNAL MEDICINE

## 2022-08-30 PROCEDURE — 82962 GLUCOSE BLOOD TEST: CPT

## 2022-08-30 PROCEDURE — 63710000001 INSULIN LISPRO (HUMAN) PER 5 UNITS: Performed by: INTERNAL MEDICINE

## 2022-08-30 PROCEDURE — 63710000001 INSULIN DETEMIR PER 5 UNITS: Performed by: INTERNAL MEDICINE

## 2022-08-30 PROCEDURE — 92526 ORAL FUNCTION THERAPY: CPT

## 2022-08-30 PROCEDURE — 94761 N-INVAS EAR/PLS OXIMETRY MLT: CPT

## 2022-08-30 PROCEDURE — 80048 BASIC METABOLIC PNL TOTAL CA: CPT | Performed by: INTERNAL MEDICINE

## 2022-08-30 PROCEDURE — 83735 ASSAY OF MAGNESIUM: CPT | Performed by: INTERNAL MEDICINE

## 2022-08-30 PROCEDURE — 99232 SBSQ HOSP IP/OBS MODERATE 35: CPT | Performed by: INTERNAL MEDICINE

## 2022-08-30 RX ORDER — DIPHENHYDRAMINE HYDROCHLORIDE, ZINC ACETATE 2; .1 G/100G; G/100G
1 CREAM TOPICAL 3 TIMES DAILY PRN
Status: DISCONTINUED | OUTPATIENT
Start: 2022-08-30 | End: 2022-09-03 | Stop reason: HOSPADM

## 2022-08-30 RX ORDER — ECHINACEA PURPUREA EXTRACT 125 MG
1 TABLET ORAL AS NEEDED
Status: DISCONTINUED | OUTPATIENT
Start: 2022-08-30 | End: 2022-09-03 | Stop reason: HOSPADM

## 2022-08-30 RX ORDER — OXYMETAZOLINE HYDROCHLORIDE 0.05 G/100ML
2 SPRAY NASAL ONCE
Status: DISCONTINUED | OUTPATIENT
Start: 2022-08-30 | End: 2022-09-03 | Stop reason: HOSPADM

## 2022-08-30 RX ORDER — OXYMETAZOLINE HYDROCHLORIDE 0.05 G/100ML
2 SPRAY NASAL 2 TIMES DAILY PRN
Status: ACTIVE | OUTPATIENT
Start: 2022-08-30 | End: 2022-09-02

## 2022-08-30 RX ORDER — HYDROCODONE BITARTRATE AND ACETAMINOPHEN 5; 325 MG/1; MG/1
1 TABLET ORAL EVERY 6 HOURS PRN
Status: DISCONTINUED | OUTPATIENT
Start: 2022-08-30 | End: 2022-09-03 | Stop reason: HOSPADM

## 2022-08-30 RX ORDER — NYSTATIN 100000 [USP'U]/G
POWDER TOPICAL EVERY 12 HOURS SCHEDULED
Status: DISCONTINUED | OUTPATIENT
Start: 2022-08-30 | End: 2022-09-03 | Stop reason: HOSPADM

## 2022-08-30 RX ORDER — POTASSIUM CHLORIDE 750 MG/1
40 CAPSULE, EXTENDED RELEASE ORAL DAILY
Status: DISCONTINUED | OUTPATIENT
Start: 2022-08-30 | End: 2022-09-03 | Stop reason: HOSPADM

## 2022-08-30 RX ORDER — DIPHENHYDRAMINE HCL 25 MG
25 CAPSULE ORAL EVERY 6 HOURS PRN
Status: DISCONTINUED | OUTPATIENT
Start: 2022-08-30 | End: 2022-08-31

## 2022-08-30 RX ADMIN — SENNOSIDES AND DOCUSATE SODIUM 2 TABLET: 50; 8.6 TABLET ORAL at 11:06

## 2022-08-30 RX ADMIN — GUAIFENESIN 1200 MG: 600 TABLET, EXTENDED RELEASE ORAL at 11:06

## 2022-08-30 RX ADMIN — ISOSORBIDE DINITRATE 20 MG: 20 TABLET ORAL at 11:07

## 2022-08-30 RX ADMIN — CASTOR OIL AND BALSAM, PERU 1 APPLICATION: 788; 87 OINTMENT TOPICAL at 20:13

## 2022-08-30 RX ADMIN — BUMETANIDE 2 MG: 0.25 INJECTION INTRAMUSCULAR; INTRAVENOUS at 17:05

## 2022-08-30 RX ADMIN — SALINE NASAL SPRAY 1 SPRAY: 1.5 SOLUTION NASAL at 13:16

## 2022-08-30 RX ADMIN — GUAIFENESIN 1200 MG: 600 TABLET, EXTENDED RELEASE ORAL at 20:11

## 2022-08-30 RX ADMIN — OXYCODONE HYDROCHLORIDE AND ACETAMINOPHEN 1 TABLET: 5; 325 TABLET ORAL at 11:09

## 2022-08-30 RX ADMIN — INSULIN LISPRO 2 UNITS: 100 INJECTION, SOLUTION INTRAVENOUS; SUBCUTANEOUS at 17:05

## 2022-08-30 RX ADMIN — ISOSORBIDE DINITRATE 20 MG: 20 TABLET ORAL at 20:11

## 2022-08-30 RX ADMIN — HYDRALAZINE HYDROCHLORIDE 25 MG: 25 TABLET ORAL at 20:12

## 2022-08-30 RX ADMIN — DIPHENHYDRAMINE HYDROCHLORIDE, ZINC ACETATE 1 APPLICATION: 2; .1 CREAM TOPICAL at 13:18

## 2022-08-30 RX ADMIN — APIXABAN 2.5 MG: 2.5 TABLET, FILM COATED ORAL at 11:09

## 2022-08-30 RX ADMIN — ASPIRIN 81 MG: 81 TABLET, COATED ORAL at 11:08

## 2022-08-30 RX ADMIN — HYDRALAZINE HYDROCHLORIDE 25 MG: 25 TABLET ORAL at 05:06

## 2022-08-30 RX ADMIN — Medication 10 ML: at 20:13

## 2022-08-30 RX ADMIN — FERROUS SULFATE TAB 325 MG (65 MG ELEMENTAL FE) 325 MG: 325 (65 FE) TAB at 11:08

## 2022-08-30 RX ADMIN — HYDROCODONE BITARTRATE AND ACETAMINOPHEN 1 TABLET: 5; 325 TABLET ORAL at 17:05

## 2022-08-30 RX ADMIN — BUMETANIDE 2 MG: 0.25 INJECTION INTRAMUSCULAR; INTRAVENOUS at 11:09

## 2022-08-30 RX ADMIN — SPIRONOLACTONE 50 MG: 25 TABLET ORAL at 11:08

## 2022-08-30 RX ADMIN — DIPHENHYDRAMINE HYDROCHLORIDE 25 MG: 25 CAPSULE ORAL at 14:32

## 2022-08-30 RX ADMIN — ATORVASTATIN CALCIUM 40 MG: 40 TABLET, FILM COATED ORAL at 11:08

## 2022-08-30 RX ADMIN — POTASSIUM CHLORIDE 40 MEQ: 750 CAPSULE, EXTENDED RELEASE ORAL at 15:44

## 2022-08-30 RX ADMIN — AMOXICILLIN AND CLAVULANATE POTASSIUM 1 TABLET: 875; 125 TABLET, FILM COATED ORAL at 11:07

## 2022-08-30 RX ADMIN — SENNOSIDES AND DOCUSATE SODIUM 2 TABLET: 50; 8.6 TABLET ORAL at 20:11

## 2022-08-30 RX ADMIN — DIPHENHYDRAMINE HYDROCHLORIDE 25 MG: 25 CAPSULE ORAL at 20:11

## 2022-08-30 RX ADMIN — APIXABAN 2.5 MG: 2.5 TABLET, FILM COATED ORAL at 20:11

## 2022-08-30 RX ADMIN — INSULIN DETEMIR 15 UNITS: 100 INJECTION, SOLUTION SUBCUTANEOUS at 11:42

## 2022-08-30 RX ADMIN — HYDRALAZINE HYDROCHLORIDE 25 MG: 25 TABLET ORAL at 14:33

## 2022-08-30 RX ADMIN — AMOXICILLIN AND CLAVULANATE POTASSIUM 1 TABLET: 875; 125 TABLET, FILM COATED ORAL at 20:11

## 2022-08-31 LAB
GLUCOSE BLDC GLUCOMTR-MCNC: 120 MG/DL (ref 70–130)
GLUCOSE BLDC GLUCOMTR-MCNC: 155 MG/DL (ref 70–130)
GLUCOSE BLDC GLUCOMTR-MCNC: 232 MG/DL (ref 70–130)
GLUCOSE BLDC GLUCOMTR-MCNC: 242 MG/DL (ref 70–130)

## 2022-08-31 PROCEDURE — 82962 GLUCOSE BLOOD TEST: CPT

## 2022-08-31 PROCEDURE — 97110 THERAPEUTIC EXERCISES: CPT

## 2022-08-31 PROCEDURE — 63710000001 DIPHENHYDRAMINE PER 50 MG: Performed by: INTERNAL MEDICINE

## 2022-08-31 PROCEDURE — 99232 SBSQ HOSP IP/OBS MODERATE 35: CPT | Performed by: INTERNAL MEDICINE

## 2022-08-31 PROCEDURE — 29580 STRAPPING UNNA BOOT: CPT

## 2022-08-31 PROCEDURE — 97597 DBRDMT OPN WND 1ST 20 CM/<: CPT

## 2022-08-31 PROCEDURE — 63710000001 INSULIN DETEMIR PER 5 UNITS: Performed by: INTERNAL MEDICINE

## 2022-08-31 PROCEDURE — 97530 THERAPEUTIC ACTIVITIES: CPT

## 2022-08-31 RX ORDER — DIPHENHYDRAMINE HCL 50 MG
50 CAPSULE ORAL NIGHTLY
Status: DISCONTINUED | OUTPATIENT
Start: 2022-08-31 | End: 2022-09-01

## 2022-08-31 RX ORDER — DIPHENHYDRAMINE HCL 25 MG
25 CAPSULE ORAL 2 TIMES DAILY
Status: DISCONTINUED | OUTPATIENT
Start: 2022-08-31 | End: 2022-09-01

## 2022-08-31 RX ORDER — DIPHENHYDRAMINE HCL 25 MG
25 CAPSULE ORAL 2 TIMES DAILY
Status: DISCONTINUED | OUTPATIENT
Start: 2022-08-31 | End: 2022-08-31

## 2022-08-31 RX ADMIN — APIXABAN 2.5 MG: 2.5 TABLET, FILM COATED ORAL at 11:28

## 2022-08-31 RX ADMIN — AMOXICILLIN AND CLAVULANATE POTASSIUM 1 TABLET: 875; 125 TABLET, FILM COATED ORAL at 20:15

## 2022-08-31 RX ADMIN — GUAIFENESIN 1200 MG: 600 TABLET, EXTENDED RELEASE ORAL at 11:28

## 2022-08-31 RX ADMIN — FERROUS SULFATE TAB 325 MG (65 MG ELEMENTAL FE) 325 MG: 325 (65 FE) TAB at 11:28

## 2022-08-31 RX ADMIN — APIXABAN 2.5 MG: 2.5 TABLET, FILM COATED ORAL at 20:16

## 2022-08-31 RX ADMIN — ATORVASTATIN CALCIUM 40 MG: 40 TABLET, FILM COATED ORAL at 11:28

## 2022-08-31 RX ADMIN — AMOXICILLIN AND CLAVULANATE POTASSIUM 1 TABLET: 875; 125 TABLET, FILM COATED ORAL at 11:28

## 2022-08-31 RX ADMIN — DIPHENHYDRAMINE HYDROCHLORIDE 25 MG: 25 CAPSULE ORAL at 15:32

## 2022-08-31 RX ADMIN — HYDRALAZINE HYDROCHLORIDE 25 MG: 25 TABLET ORAL at 20:16

## 2022-08-31 RX ADMIN — SENNOSIDES AND DOCUSATE SODIUM 2 TABLET: 50; 8.6 TABLET ORAL at 11:26

## 2022-08-31 RX ADMIN — POTASSIUM CHLORIDE 40 MEQ: 750 CAPSULE, EXTENDED RELEASE ORAL at 11:27

## 2022-08-31 RX ADMIN — NYSTATIN: 100000 POWDER TOPICAL at 11:30

## 2022-08-31 RX ADMIN — ASPIRIN 81 MG: 81 TABLET, COATED ORAL at 11:28

## 2022-08-31 RX ADMIN — HYDROCODONE BITARTRATE AND ACETAMINOPHEN 1 TABLET: 5; 325 TABLET ORAL at 07:49

## 2022-08-31 RX ADMIN — ISOSORBIDE DINITRATE 20 MG: 20 TABLET ORAL at 20:16

## 2022-08-31 RX ADMIN — BUMETANIDE 3 MG: 2 TABLET ORAL at 11:27

## 2022-08-31 RX ADMIN — ISOSORBIDE DINITRATE 20 MG: 20 TABLET ORAL at 11:27

## 2022-08-31 RX ADMIN — CASTOR OIL AND BALSAM, PERU 1 APPLICATION: 788; 87 OINTMENT TOPICAL at 11:29

## 2022-08-31 RX ADMIN — NYSTATIN: 100000 POWDER TOPICAL at 20:29

## 2022-08-31 RX ADMIN — BUMETANIDE 3 MG: 2 TABLET ORAL at 20:16

## 2022-08-31 RX ADMIN — DIPHENHYDRAMINE HYDROCHLORIDE 25 MG: 25 CAPSULE ORAL at 07:49

## 2022-08-31 RX ADMIN — GUAIFENESIN 1200 MG: 600 TABLET, EXTENDED RELEASE ORAL at 20:16

## 2022-08-31 RX ADMIN — SPIRONOLACTONE 50 MG: 25 TABLET ORAL at 11:27

## 2022-08-31 RX ADMIN — INSULIN DETEMIR 15 UNITS: 100 INJECTION, SOLUTION SUBCUTANEOUS at 11:27

## 2022-08-31 RX ADMIN — SENNOSIDES AND DOCUSATE SODIUM 2 TABLET: 50; 8.6 TABLET ORAL at 20:16

## 2022-08-31 RX ADMIN — HYDRALAZINE HYDROCHLORIDE 25 MG: 25 TABLET ORAL at 13:49

## 2022-08-31 RX ADMIN — DIPHENHYDRAMINE HYDROCHLORIDE 50 MG: 50 CAPSULE ORAL at 20:13

## 2022-08-31 RX ADMIN — HYDROCODONE BITARTRATE AND ACETAMINOPHEN 1 TABLET: 5; 325 TABLET ORAL at 14:56

## 2022-08-31 RX ADMIN — Medication 10 ML: at 20:29

## 2022-09-01 LAB
ALBUMIN SERPL-MCNC: 3.2 G/DL (ref 3.5–5.2)
ANION GAP SERPL CALCULATED.3IONS-SCNC: 11 MMOL/L (ref 5–15)
ARTERIAL PATENCY WRIST A: POSITIVE
ATMOSPHERIC PRESS: ABNORMAL MM[HG]
BACTERIA SPEC AEROBE CULT: NORMAL
BASE EXCESS BLDA CALC-SCNC: 14.4 MMOL/L (ref 0–2)
BDY SITE: ABNORMAL
BODY TEMPERATURE: 37 C
BUN SERPL-MCNC: 74 MG/DL (ref 8–23)
BUN/CREAT SERPL: 54 (ref 7–25)
CALCIUM SPEC-SCNC: 9.2 MG/DL (ref 8.6–10.5)
CHLORIDE SERPL-SCNC: 91 MMOL/L (ref 98–107)
CO2 BLDA-SCNC: 42.4 MMOL/L (ref 22–33)
CO2 SERPL-SCNC: 37 MMOL/L (ref 22–29)
COHGB MFR BLD: 1.5 % (ref 0–2)
CREAT SERPL-MCNC: 1.37 MG/DL (ref 0.57–1)
EGFRCR SERPLBLD CKD-EPI 2021: 38.4 ML/MIN/1.73
EPAP: 0
GLUCOSE BLDC GLUCOMTR-MCNC: 170 MG/DL (ref 70–130)
GLUCOSE BLDC GLUCOMTR-MCNC: 191 MG/DL (ref 70–130)
GLUCOSE BLDC GLUCOMTR-MCNC: 256 MG/DL (ref 70–130)
GLUCOSE SERPL-MCNC: 178 MG/DL (ref 65–99)
HCO3 BLDA-SCNC: 40.6 MMOL/L (ref 20–26)
HCT VFR BLD CALC: 28.6 % (ref 38–51)
HGB BLDA-MCNC: 9.3 G/DL (ref 14–18)
INHALED O2 CONCENTRATION: 28 %
IPAP: 0
MAGNESIUM SERPL-MCNC: 1.8 MG/DL (ref 1.6–2.4)
METHGB BLD QL: 0.3 % (ref 0–1.5)
MODALITY: ABNORMAL
NOTE: ABNORMAL
OXYHGB MFR BLDV: 94.3 % (ref 94–99)
PAW @ PEAK INSP FLOW SETTING VENT: 0 CMH2O
PCO2 BLDA: 60.3 MM HG (ref 35–45)
PCO2 TEMP ADJ BLD: 60.3 MM HG (ref 35–45)
PH BLDA: 7.44 PH UNITS (ref 7.35–7.45)
PH, TEMP CORRECTED: 7.44 PH UNITS
PHOSPHATE SERPL-MCNC: 3.6 MG/DL (ref 2.5–4.5)
PO2 BLDA: 76 MM HG (ref 83–108)
PO2 TEMP ADJ BLD: 76 MM HG (ref 83–108)
POTASSIUM SERPL-SCNC: 3.5 MMOL/L (ref 3.5–5.2)
SODIUM SERPL-SCNC: 139 MMOL/L (ref 136–145)
TOTAL RATE: 0 BREATHS/MINUTE

## 2022-09-01 PROCEDURE — 82962 GLUCOSE BLOOD TEST: CPT

## 2022-09-01 PROCEDURE — 36600 WITHDRAWAL OF ARTERIAL BLOOD: CPT

## 2022-09-01 PROCEDURE — 80069 RENAL FUNCTION PANEL: CPT | Performed by: INTERNAL MEDICINE

## 2022-09-01 PROCEDURE — 92526 ORAL FUNCTION THERAPY: CPT

## 2022-09-01 PROCEDURE — 63710000001 ONDANSETRON PER 8 MG: Performed by: PHYSICIAN ASSISTANT

## 2022-09-01 PROCEDURE — 83735 ASSAY OF MAGNESIUM: CPT | Performed by: INTERNAL MEDICINE

## 2022-09-01 PROCEDURE — 63710000001 INSULIN LISPRO (HUMAN) PER 5 UNITS: Performed by: INTERNAL MEDICINE

## 2022-09-01 PROCEDURE — 99232 SBSQ HOSP IP/OBS MODERATE 35: CPT | Performed by: INTERNAL MEDICINE

## 2022-09-01 PROCEDURE — 63710000001 INSULIN DETEMIR PER 5 UNITS: Performed by: INTERNAL MEDICINE

## 2022-09-01 PROCEDURE — 82375 ASSAY CARBOXYHB QUANT: CPT

## 2022-09-01 PROCEDURE — 82805 BLOOD GASES W/O2 SATURATION: CPT

## 2022-09-01 PROCEDURE — 63710000001 DIPHENHYDRAMINE PER 50 MG: Performed by: INTERNAL MEDICINE

## 2022-09-01 PROCEDURE — 83050 HGB METHEMOGLOBIN QUAN: CPT

## 2022-09-01 RX ORDER — MAGNESIUM SULFATE HEPTAHYDRATE 40 MG/ML
4 INJECTION, SOLUTION INTRAVENOUS AS NEEDED
Status: DISCONTINUED | OUTPATIENT
Start: 2022-09-01 | End: 2022-09-01

## 2022-09-01 RX ORDER — DIPHENHYDRAMINE HCL 25 MG
25 CAPSULE ORAL 3 TIMES DAILY PRN
Status: DISCONTINUED | OUTPATIENT
Start: 2022-09-01 | End: 2022-09-03 | Stop reason: HOSPADM

## 2022-09-01 RX ORDER — ONDANSETRON 4 MG/1
4 TABLET, FILM COATED ORAL EVERY 6 HOURS PRN
Status: DISCONTINUED | OUTPATIENT
Start: 2022-09-01 | End: 2022-09-03 | Stop reason: HOSPADM

## 2022-09-01 RX ORDER — MAGNESIUM SULFATE HEPTAHYDRATE 40 MG/ML
2 INJECTION, SOLUTION INTRAVENOUS AS NEEDED
Status: DISCONTINUED | OUTPATIENT
Start: 2022-09-01 | End: 2022-09-01

## 2022-09-01 RX ORDER — POTASSIUM CHLORIDE 7.45 MG/ML
10 INJECTION INTRAVENOUS
Status: DISCONTINUED | OUTPATIENT
Start: 2022-09-01 | End: 2022-09-03 | Stop reason: HOSPADM

## 2022-09-01 RX ORDER — BUMETANIDE 2 MG/1
2 TABLET ORAL DAILY
Status: DISCONTINUED | OUTPATIENT
Start: 2022-09-02 | End: 2022-09-03 | Stop reason: HOSPADM

## 2022-09-01 RX ORDER — POTASSIUM CHLORIDE 1.5 G/1.77G
40 POWDER, FOR SOLUTION ORAL AS NEEDED
Status: DISCONTINUED | OUTPATIENT
Start: 2022-09-01 | End: 2022-09-03 | Stop reason: HOSPADM

## 2022-09-01 RX ORDER — POTASSIUM CHLORIDE 750 MG/1
40 CAPSULE, EXTENDED RELEASE ORAL AS NEEDED
Status: DISCONTINUED | OUTPATIENT
Start: 2022-09-01 | End: 2022-09-03 | Stop reason: HOSPADM

## 2022-09-01 RX ADMIN — NYSTATIN: 100000 POWDER TOPICAL at 20:41

## 2022-09-01 RX ADMIN — NYSTATIN 1 APPLICATION: 100000 POWDER TOPICAL at 10:06

## 2022-09-01 RX ADMIN — ISOSORBIDE DINITRATE 20 MG: 20 TABLET ORAL at 10:07

## 2022-09-01 RX ADMIN — FERROUS SULFATE TAB 325 MG (65 MG ELEMENTAL FE) 325 MG: 325 (65 FE) TAB at 10:07

## 2022-09-01 RX ADMIN — ATORVASTATIN CALCIUM 40 MG: 40 TABLET, FILM COATED ORAL at 10:07

## 2022-09-01 RX ADMIN — GUAIFENESIN 1200 MG: 600 TABLET, EXTENDED RELEASE ORAL at 10:06

## 2022-09-01 RX ADMIN — Medication 400 MG: at 12:51

## 2022-09-01 RX ADMIN — INSULIN LISPRO 6 UNITS: 100 INJECTION, SOLUTION INTRAVENOUS; SUBCUTANEOUS at 17:04

## 2022-09-01 RX ADMIN — HYDRALAZINE HYDROCHLORIDE 25 MG: 25 TABLET ORAL at 06:18

## 2022-09-01 RX ADMIN — APIXABAN 2.5 MG: 2.5 TABLET, FILM COATED ORAL at 10:07

## 2022-09-01 RX ADMIN — AMOXICILLIN AND CLAVULANATE POTASSIUM 1 TABLET: 875; 125 TABLET, FILM COATED ORAL at 10:06

## 2022-09-01 RX ADMIN — INSULIN LISPRO 2 UNITS: 100 INJECTION, SOLUTION INTRAVENOUS; SUBCUTANEOUS at 12:50

## 2022-09-01 RX ADMIN — INSULIN DETEMIR 15 UNITS: 100 INJECTION, SOLUTION SUBCUTANEOUS at 10:13

## 2022-09-01 RX ADMIN — BUMETANIDE 3 MG: 2 TABLET ORAL at 12:51

## 2022-09-01 RX ADMIN — SENNOSIDES AND DOCUSATE SODIUM 2 TABLET: 50; 8.6 TABLET ORAL at 12:52

## 2022-09-01 RX ADMIN — SPIRONOLACTONE 50 MG: 25 TABLET ORAL at 10:06

## 2022-09-01 RX ADMIN — DIPHENHYDRAMINE HYDROCHLORIDE 25 MG: 25 CAPSULE ORAL at 17:07

## 2022-09-01 RX ADMIN — ASPIRIN 81 MG: 81 TABLET, COATED ORAL at 10:07

## 2022-09-01 RX ADMIN — INSULIN LISPRO 2 UNITS: 100 INJECTION, SOLUTION INTRAVENOUS; SUBCUTANEOUS at 10:07

## 2022-09-01 RX ADMIN — HYDRALAZINE HYDROCHLORIDE 25 MG: 25 TABLET ORAL at 14:40

## 2022-09-01 RX ADMIN — Medication 10 ML: at 10:07

## 2022-09-01 RX ADMIN — POTASSIUM CHLORIDE 40 MEQ: 750 CAPSULE, EXTENDED RELEASE ORAL at 10:06

## 2022-09-01 NOTE — PROGRESS NOTES
"   LOS: 11 days    Patient Care Team:  Arleen Doherty MD as PCP - General (Internal Medicine)  Flory Sauer APRN (Nurse Practitioner)  Janeth Lam MD as Consulting Physician (Endocrinology)  Anjum Ceballos MD as Consulting Physician (Cardiology)    Reason For Visit:  F/U LACEY ON CKD  Subjective           Review of Systems:    Pulm: No soa   CV:  No CP      Objective     amoxicillin-clavulanate, 1 tablet, Oral, Q12H  apixaban, 2.5 mg, Oral, BID  aspirin, 81 mg, Oral, Daily  atorvastatin, 40 mg, Oral, Daily  [START ON 9/2/2022] bumetanide, 2 mg, Oral, Daily  ferrous sulfate, 325 mg, Oral, Daily With Breakfast  guaiFENesin, 1,200 mg, Oral, BID  hydrALAZINE, 25 mg, Oral, Q8H  insulin detemir, 15 Units, Subcutaneous, Daily  insulin lispro, 0-9 Units, Subcutaneous, TID AC  isosorbide dinitrate, 20 mg, Oral, BID  magnesium oxide, 400 mg, Oral, Daily  nystatin, , Topical, Q12H  oxymetazoline, 2 spray, Each Nare, Once  pharmacy consult - MTM, , Does not apply, Daily  potassium chloride, 40 mEq, Oral, Daily  senna-docusate sodium, 2 tablet, Oral, BID  sodium chloride, 10 mL, Intravenous, Q12H  spironolactone, 50 mg, Oral, Daily             Vital Signs:  Blood pressure 150/78, pulse 89, temperature 98.8 °F (37.1 °C), temperature source Oral, resp. rate 16, height 162.6 cm (64\"), weight 103 kg (226 lb 9.6 oz), SpO2 96 %.    Flowsheet Rows    Flowsheet Row First Filed Value   Admission Height 162.6 cm (64\") Documented at 08/21/2022 1103   Admission Weight 102 kg (224 lb) Documented at 08/21/2022 1103          08/31 0701 - 09/01 0700  In: 510 [P.O.:510]  Out: 1750 [Urine:1750]    Physical Exam:    General Appearance: NAD, alert and cooperative, Ox3  Eyes: PER, conjunctivae and sclerae normal, no icterus  Lungs: respirations regular and unlabored, no crepitus, clear to auscultation  Heart/CV: regular rhythm & normal rate, no murmur, no gallop, no rub and TR PRESACRAL edema  Abdomen: not distended, soft, " non-tender, no masses,  bowel sounds present  Skin: No rash, Warm and dry    Radiology:      Renal Imaging:        Labs:  Results from last 7 days   Lab Units 08/28/22  0711 08/27/22  0927 08/26/22  0817   WBC 10*3/mm3 7.26 8.82 7.84   HEMOGLOBIN g/dL 9.0* 9.1* 9.4*   HEMATOCRIT % 28.9* 29.7* 30.0*   PLATELETS 10*3/mm3 198 195 217     Results from last 7 days   Lab Units 09/01/22  0925 08/30/22  0708 08/29/22  1441 08/28/22  0711 08/27/22  0927 08/26/22  1627   SODIUM mmol/L 139 141 142 143 138  --    POTASSIUM mmol/L 3.5 3.0* 3.8 3.3* 4.0  --    CHLORIDE mmol/L 91* 93* 96* 100 98  --    CO2 mmol/L 37.0* 33.0* 29.0 29.0 27.0  --    BUN mg/dL 74* 82* 83* 79* 78*  --    CREATININE mg/dL 1.37* 1.33* 1.34* 1.48* 1.38*  --    CALCIUM mg/dL 9.2 9.3 9.3 8.8 8.8  --    PHOSPHORUS mg/dL 3.6  --   --  3.5 3.7  --    MAGNESIUM mg/dL 1.8 1.8  --   --   --  2.0   ALBUMIN g/dL 3.20*  --   --  2.90* 3.00*  --      Results from last 7 days   Lab Units 09/01/22  0925   GLUCOSE mg/dL 178*           Results from last 7 days   Lab Units 09/01/22  1205   PH, ARTERIAL pH units 7.437   PO2 ART mm Hg 76.0*   PCO2, ARTERIAL mm Hg 60.3*   HCO3 ART mmol/L 40.6*             Estimated Creatinine Clearance: 36.3 mL/min (A) (by C-G formula based on SCr of 1.37 mg/dL (H)).      Assessment       Acute on chronic combined systolic and diastolic CHF (congestive heart failure) ([facility])    PVD (peripheral vascular disease) ([facility])    Diabetes mellitus with neuropathy (HCC)    Essential hypertension    CKD (chronic kidney disease), stage III ([facility])    Anemia, chronic disease    LACEY (acute kidney injury) (HCC)    Atrial fibrillation (HCC)    Bradycardia    Pressure injury of skin of sacral region            Impression: LACEY ON CKD. STABLE GFR. VOLUME OVERLOAD IS BETTER. PROTEINURIA. ANEMIA.             Recommendations: DECREASE BUMEX DOSE TO QD. CONTINUE ALDACTONE. TO REHAB SOON. F/U NAL 10/27/22 AT 11:30 AM.       Babatunde Darnell MD  09/01/22  13:07  EDT

## 2022-09-01 NOTE — CASE MANAGEMENT/SOCIAL WORK
Continued Stay Note  [facility]     Patient Name: Susan Anderson  MRN: [id number]  Today's Date: 9/1/2022    Admit Date: 8/21/2022     Discharge Plan     Row Name 09/01/22 1455       Plan    Plan update    Patient/Family in Agreement with Plan yes    Plan Comments Received a call from liaison from [facility] who advises that they will not be able to offer patient a bed.  Called liaison for [facility] and left VM with name and callback number.  Reqeust sent to rehab services for patient to be seen by PT/OT tomorrow.  Spoke with patient and daughter at bedside regarding discharge plan and advised that [facility] was off the table now and will focus on returning to [facility].  Patient daughter verbalizes understanding and agreement with plan, patient did not respond.  No new discharge needs verbalized.  CM following.  Patient plan is to discharge back to [facility] when insurance approved.    Final Discharge Disposition Code 03 - skilled nursing facility (SNF)               Discharge Codes    No documentation.               Expected Discharge Date and Time     Expected Discharge Date Expected Discharge Time    Sep 1, 2022             Larissa Freeman, RN

## 2022-09-01 NOTE — THERAPY TREATMENT NOTE
Acute Care - Speech Language Pathology   Swallow Treatment Note [facility]     Patient Name: Susan Anderson  : 1940  MRN: [id number]  Today's Date: 2022               Admit Date: 2022    Visit Dx:     ICD-10-CM ICD-9-CM   1. Acute on chronic congestive heart failure, unspecified heart failure type (Formerly [facility])  I50.9 428.0   2. Acute on chronic respiratory failure with hypoxia (Formerly [facility])  J96.21 518.84     799.02   3. Bradycardia  R00.1 427.89   4. Acute kidney injury (Formerly [facility])  N17.9 584.9   5. Anemia, unspecified type  D64.9 285.9   6. Elevated troponin  R77.8 790.6   7. Oral phase dysphagia  R13.11 787.21     Patient Active Problem List   Diagnosis   • Diabetic foot ulcer (Formerly [facility])   • PVD (peripheral vascular disease) (Formerly [facility])   • Osteomyelitis (Formerly [facility])   • Diabetes mellitus with neuropathy (Formerly [facility])   • Essential hypertension   • CKD (chronic kidney disease), stage III (Formerly [facility])   • Pyogenic inflammation of bone (Formerly [facility])   • Hyperglycemia   • Pneumonia due to infectious organism   • Acute hypoxemic respiratory failure (Formerly [facility])   • Mitral valve disease   • NICM (nonischemic cardiomyopathy) (Formerly [facility])   • Coronary artery disease involving native coronary artery of native heart without angina pectoris   • Dyslipidemia   • Chronic combined systolic and diastolic heart failure (Formerly [facility])   • Lumbar stenosis with neurogenic claudication   • Spondylosis of lumbar region without myelopathy or radiculopathy   • Spondylolisthesis, lumbar region   • Degeneration of lumbar or lumbosacral intervertebral disc   • Gait disturbance   • Physical deconditioning   • Sarcopenia   • Weakness   • Anemia, chronic disease   • Fall   • Dizziness   • Acoustic neuroma (Formerly [facility])   • Acute on chronic combined systolic and diastolic CHF (congestive heart failure) (Formerly [facility])   • LACEY (acute kidney injury) (Formerly [facility])   • Effusion of right knee   • Right knee pain   • Atrial fibrillation (Formerly [facility])   • Bradycardia   • Pressure injury of skin of sacral region     Past Medical History:    Diagnosis Date   • Arthritis    • Asthma 6/4 - double pneumonia    Currently on inhaler and nebulizer   • Atrial fibrillation (HCC) 8/21/2022   • Cancer (HCC)     cervical cancer, skin cancer   • CHF (congestive heart failure) (HCC) June 4, 2021   • Chronic kidney disease Related to diabetes   • Coronary artery disease 6/4/2021 DX for hear failure   • Diabetes mellitus (HCC) 30 years    Seeing Dr. Lam 1st time Aug 19   • Gout    • Hx of colonoscopy    • Hyperlipidemia Reference current labs x 2-3yrs approx   • Hypertension 30 years   • Migraine    • Mitral valve disease    • Mitral valve disease    • Osteomyelitis (HCC)    • Peripheral neuropathy    • Sleep apnea    • Type 2 diabetes mellitus (HCC)     30 years     Past Surgical History:   Procedure Laterality Date   • ABDOMINAL HYSTERECTOMY W/SALPINGECTOMY     • AMPUTATION  Right great toe, 1st 1/3 metatarsal -Reg 4/14/21   • AORTAGRAM N/A 4/9/2021    Procedure: AORTAGRAM WITH OR WITHOUT RUNOFFS, WITH Co2;  Surgeon: Trey Forrest MD;  Location:  [facility];  Service: Vascular;  Laterality: N/A;   • APPENDECTOMY     • BRAIN TUMOR EXCISION      laser surgery    • CARDIAC CATHETERIZATION N/A 6/21/2021    Procedure: LEFT HEART CATH;  Surgeon: Anjum Ceballos MD;  Location:  [facility];  Service: Cardiology;  Laterality: N/A;   • CATARACT EXTRACTION W/ INTRAOCULAR LENS  IMPLANT, BILATERAL     • CHOLECYSTECTOMY     • EYE SURGERY     • HYSTERECTOMY     • TOE SURGERY     • TRANS METATARSAL AMPUTATION Right 4/14/2021    Procedure: AMPUTATION TRANS METATARSAL RIGHT GREAT TOE;  Surgeon: Valdez Finney MD;  Location:  [facility];  Service: Vascular;  Laterality: Right;       SLP Recommendation and Plan     SLP Diet Recommendation: puree with some mashed, thin liquids (09/01/22 0931)  Recommended Precautions and Strategies: upright posture during/after eating, small bites of food and sips of liquid, alternate between small bites of food and sips of  liquid, general aspiration precautions, reflux precautions, fatigue precautions, assist with feeding (09/01/22 0945)  SLP Rec. for Method of Medication Administration: meds whole, with thin liquids, with pudding or applesauce, as tolerated (09/01/22 0945)     Monitor for Signs of Aspiration: yes, notify SLP if any concerns (09/01/22 0945)  Recommended Diagnostics: other (see comments) (diet tolerance) (09/01/22 0945)     Anticipated Discharge Disposition (SLP): anticipate therapy at next level of care (09/01/22 0945)     Therapy Frequency (Swallow): PRN (09/01/22 0945)  Predicted Duration Therapy Intervention (Days): until discharge (09/01/22 0945)     Daily Summary of Progress (SLP): progress toward functional goals as expected (09/01/22 0945)               Treatment Assessment (SLP): Pt seen for diet tolerance. Pt extremely lethargic but able to accept trials. No overt s/s of aspiration w/ thin liquid or pureed trials. SLP will continue to monitor closely and repeat instrumental assessment if needed. Rec: cont pureed and thin liquid diet, feed only if fully awake/ alert. SLP will f/u to ensure no further concerns (09/01/22 0945)  Plan for Continued Treatment (SLP): continue treatment per plan of care (09/01/22 0945)                SWALLOW EVALUATION (last 72 hours)     SLP Adult Swallow Evaluation     Row Name 09/01/22 0945 08/30/22 1235 08/29/22 1140             Rehab Evaluation    Document Type therapy note (daily note)  - therapy note (daily note)  -RD therapy note (daily note)  -AC      Subjective Information fatigue  - complains of;pain;fatigue  -RD complains of;pain;fatigue  -AC      Patient Observations lethargic  - alert;cooperative;agree to therapy  w/ encouragement  -RD alert;cooperative  w/ encouragement  -AC      Patient/Family/Caregiver Comments/Observations No family present  - dtr present  -RD Dtr present.  -AC      Patient Effort adequate  -MH adequate  -RD adequate  -AC      Symptoms Noted  During/After Treatment none  - -- --              Pain    Additional Documentation Pain Scale: FACES Pre/Post-Treatment (Group)  - Pain Scale: FACES Pre/Post-Treatment (Group)  -RD --              Pain Scale: FACES Pre/Post-Treatment    Pain: FACES Scale, Pretreatment 0-->no hurt  -MH 2-->hurts little bit  -RD 6-->hurts even more  -AC      Posttreatment Pain Rating 0-->no hurt  -MH 2-->hurts little bit  -RD 6-->hurts even more  -AC      Pain Location -- -- generalized  -AC      Pre/Posttreatment Pain Comment -- -- Repositioned. Notified RN.  -              SLP Evaluation Clinical Impression    SLP Swallowing Diagnosis -- oral dysphagia  -RD --      Functional Impact -- risk of aspiration/pneumonia  -RD --      Rehab Potential/Prognosis, Swallowing -- adequate, monitor progress closely  -RD --      Swallow Criteria for Skilled Therapeutic Interventions Met -- demonstrates skilled criteria  -RD --              SLP Treatment Clinical Impressions    Treatment Assessment (SLP) Pt seen for diet tolerance. Pt extremely lethargic but able to accept trials. No overt s/s of aspiration w/ thin liquid or pureed trials. SLP will continue to monitor closely and repeat instrumental assessment if needed. Rec: cont pureed and thin liquid diet, feed only if fully awake/ alert. SLP will f/u to ensure no further concerns  - Dysphagia treatment completed. No overt s/s of aspiration w/ current diet. Observed w/ lunch tray. Although poor appetite & burping frequently. Educated on dysphagia and precautions. Pt participated well w/ dysphagia exercises w/ encouragement. Continue to address dysphagia during treatment  -RD No overt clinical s/sxs pharyngeal dysphagia. Cont'd concern for oral dysphagia given cognitive status and respiratory status. Will continue to monitor closely and consider repeat instrumental swallow study if concerns arise. Continue dysphagia level III (puree/some mashed) trays, thin liquids, and use of strict  aspiration precautions.  -      Daily Summary of Progress (SLP) progress toward functional goals as expected  - progress towards functional goals is fair  - progress towards functional goals is fair  -      Barriers to Overall Progress (SLP) Fatigue  - Fatigue  - Other (see comments)  pain  -      Plan for Continued Treatment (SLP) continue treatment per plan of care  - continue treatment per plan of care  - continue treatment per plan of care  -      Care Plan Review evaluation/treatment results reviewed  - evaluation/treatment results reviewed;care plan/treatment goals reviewed;risks/benefits reviewed;current/potential barriers reviewed;patient/other agree to care plan  -RD evaluation/treatment results reviewed;care plan/treatment goals reviewed;risks/benefits reviewed;patient/other agree to care plan;current/potential barriers reviewed  -      Care Plan Review, Other Participant(s) -- daughter  - daughter  -              Recommendations    Therapy Frequency (Swallow) PRN  - PRN  -RD --      Predicted Duration Therapy Intervention (Days) until discharge  - until discharge  - --      SLP Diet Recommendation puree with some mashed;thin liquids  - puree with some mashed;thin liquids  - --      Recommended Diagnostics other (see comments)  diet tolerance  - -- --      Recommended Precautions and Strategies upright posture during/after eating;small bites of food and sips of liquid;alternate between small bites of food and sips of liquid;general aspiration precautions;reflux precautions;fatigue precautions;assist with feeding  - upright posture during/after eating;small bites of food and sips of liquid;alternate between small bites of food and sips of liquid;general aspiration precautions;reflux precautions;fatigue precautions;assist with feeding  - --      Oral Care Recommendations Oral Care BID/PRN  - Oral Care BID/PRN  -RD --      SLP Rec. for Method of Medication  Administration meds whole;with thin liquids;with pudding or applesauce;as tolerated  - meds whole;with thin liquids;with pudding or applesauce;as tolerated  -RD --      Monitor for Signs of Aspiration yes;notify SLP if any concerns  - yes;notify SLP if any concerns  -RD --      Anticipated Discharge Disposition (SLP) anticipate therapy at next level of care  - anticipate therapy at next level of care  -RD anticipate therapy at next level of care  -AC      Demonstrates Need for Referral to Another Service -- -- clinical nutrition services/dietitian;other (see comments)  messaged RD re: intake concerns  -AC            User Key  (r) = Recorded By, (t) = Taken By, (c) = Cosigned By    Initials Name Effective Dates    AC Maryse Huffman, MS CCC-SLP 06/16/21 -     Vi Caruso MS CCC-SLP 06/16/21 -      Leonarda Valencia, RODRIGO MASSEY-SLP 06/22/22 -                 EDUCATION  The patient has been educated in the following areas:   Dysphagia (Swallowing Impairment) Modified Diet Instruction.        SLP GOALS     Row Name 08/30/22 1235 08/29/22 1140          (LTG) Patient will demonstrate functional swallow for    Diet Texture (Demonstrate functional swallow) soft whole textures  -RD soft whole textures  -AC     Liquid viscosity (Demonstrate functional swallow) thin liquids  -RD thin liquids  -AC     [place] (Demonstrate functional swallow) with minimal cues (75-90% accuracy)  -RD with minimal cues (75-90% accuracy)  -AC     Time Frame (Demonstrate functional swallow) by discharge  -RD by discharge  -AC     Progress/Outcomes (Demonstrate functional swallow) continuing progress toward goal  -RD continuing progress toward goal  -AC     Comment (Demonstrate functional swallow) pt refused to try whole solid trials  -RD --            (STG) Patient will tolerate trials of    Consistencies Trialed (Tolerate trials) pureed/ mashed textures;thin liquids  -RD pureed/ mashed textures;thin liquids  -AC     Desired  Outcome (Tolerate trials) with adequate oral prep/transit/clearance;without signs/symptoms of aspiration  -RD with adequate oral prep/transit/clearance;without signs/symptoms of aspiration  -AC     [place] (Tolerate trials) with 1:1 assist/ supervision  -RD with 1:1 assist/ supervision  -AC     Time Frame (Tolerate trials) by discharge  -RD by discharge  -AC     Progress/Outcomes (Tolerate trials) continuing progress toward goal  -RD continuing progress toward goal  -AC     Comment (Tolerate trials) poor appetite, but accepted some PO trials of pureed some mashed items and thin liquids. No overt s/s of aspiration w/ trials given or w/ lunch tray. Pt refused soft whole solids given. Burping at end of trials which is baseline for patient. Education provided on fatigue precautions  -RD Assessed w/ lunch. No overt clinical s/sxs aspiration w/ thin liquid via straw or puree/mashed textures. Intermittently exhibited belching & dtr reported some difficulty w/ regurgitation recently. On HFNC. SpO2 remained stable t/o tx session & no signs of respiratory distress.  -AC            (STG) Patient will tolerate therapeutic trials of    Consistencies Trialed (Tolerate therapeutic trials) soft whole textures  -RD soft whole textures;thin liquids  -AC     Desired Outcome (Tolerate therapeutic trials) with adequate oral prep/transit/clearance;without signs/symptoms of aspiration  -RD with adequate oral prep/transit/clearance;without signs/symptoms of aspiration  -AC     [place] (Tolerate therapeutic trials) with minimal cues (75-90% accuracy)  -RD with minimal cues (75-90% accuracy)  -AC     Time Frame (Tolerate therapeutic trials) by discharge  -RD by discharge  -AC     Progress/Outcomes (Tolerate therapeutic trials) goal ongoing  -RD goal ongoing  -AC     Comment (Tolerate therapeutic trials) pt refused trials  -RD Deferred solid trials this date 2' respiratory status & pt discomfort/pain.  -AC            (STG) Lingual  Strengthening Goal 1 (SLP)    Activity (Lingual Strengthening Goal 1, SLP) increase lingual tone/sensation/control/coordination/movement;increase tongue back strength  -RD --     Increase Lingual Tone/Sensation/Control/Coordination/Movement lingual resistance exercises  -RD lingual resistance exercises  -AC     Increase Tongue Back Strength lingual resistance exercises  -RD lingual resistance exercises  -AC     [place]/Accuracy (Lingual Strengthening Goal 1, SLP) with minimal cues (75-90% accuracy)  -RD with minimal cues (75-90% accuracy)  -AC     Time Frame (Lingual Strengthening Goal 1, SLP) short term goal (STG)  -RD short term goal (STG)  -AC     Progress/Outcomes (Lingual Strengthening Goal 1, SLP) continuing progress toward goal  -RD goal ongoing  -AC     Comment (Lingual Strengthening Goal 1, SLP) x5 reps for 3 exercises  -RD Deferred 2' pain/discomfort & pt attempting to eat lunch.  -AC           User Key  (r) = Recorded By, (t) = Taken By, (c) = Cosigned By    Initials Name Provider Type     Maryse Huffman MS CCC-SLP Speech and Language Pathologist    RD Vi Reyes MS CCC-SLP Speech and Language Pathologist                   Time Calculation:    Time Calculation- SLP     Row Name 09/01/22 1112             Time Calculation- SLP    SLP Start Time 0945  -      SLP Received On 09/01/22  -              Untimed Charges    39173-OD Treatment Swallow Minutes 40  -MH              Total Minutes    Untimed Charges Total Minutes 40  -MH       Total Minutes 40  -MH            User Key  (r) = Recorded By, (t) = Taken By, (c) = Cosigned By    Initials Name Provider Type     Leonarda Valencia, MS, CFY-SLP Speech and Language Pathologist                Therapy Charges for Today     Code Description Service Date Service Provider Modifiers Qty    [id number]  ST TREATMENT SWALLOW 3 9/1/2022 Leonarda Valencia, MS, CFY-SLP GN 1            Patient was not wearing a face mask and did not exhibit coughing  during this therapy encounter.  Procedure performed was not aerosolizing, did not involve close contact (within 6 feet for at least 15 minutes or longer), and did not involve contact with infectious secretions or specimens.  Therapist used appropriate personal protective equipment including gloves, standard procedure mask and eye protection.  Appropriate PPE was worn during the entire therapy session.  Hand hygiene was completed before and after therapy session.       Leonarda Valencia MS, CFY-SLP  9/1/2022

## 2022-09-01 NOTE — SIGNIFICANT NOTE
Patient confused and pulling nasal cannula off repeatedly. Leading to acute oxygen desaturation and hypoxia. difficult to re-oxygenate and return to baseline/stable hemodynamics. Will try restraints overnight with close monitor. Defer to attending in am

## 2022-09-01 NOTE — NURSING NOTE
Pt is combative and restless, unable to redirect. Pt pulling off oxygen and her 02 sat noted to be in 79%. Pt is SOB, INDIGO Bray notified new orders noted for restraints. Pt's daughter notified, she does not want patient placed in restraints and will come in to sit with patient.  Pt currently sating 92% on 4LNC.

## 2022-09-01 NOTE — PLAN OF CARE
Goal Outcome Evaluation:      SLP treatment completed. Will continue to address dysphagia. Please see note for further details and recommendations.

## 2022-09-01 NOTE — PROGRESS NOTES
[facility] Medicine Services  PROGRESS NOTE    Patient Name: Susan Anderson  : 1940  MRN: [id number]    Date of Admission: 2022  Primary Care Physician: Arleen Doherty MD    Subjective     CC: f/u AC CHF     HPI:  After getting benadryl 50mg last evening for her chronic scalp pruritus, she was confused and pulling off oxygen during the night.  Currently she tells me she is sleepy and a bit cold.  Then closes eyes.      ROS:  Per RN, no changes.  She is weaned to 2L nc oxygen and has been tolerating it  Denies pain  Scalp itch is better today     Objective     Vital Signs:   Temp:  [97.7 °F (36.5 °C)-98.8 °F (37.1 °C)] 97.7 °F (36.5 °C)  Heart Rate:  [] 84  Resp:  [15-16] 16  BP: (152-169)/(75-82) 152/80  Flow (L/min):  [2-6] 2     Physical Exam:  Constitutional: dozing in bed, looks very comfortable  HENT: NCAT, mucous membranes moist  Respiratory: on 2L nc oxygen, breathing nonlabored, aneriorly clear  Cardiovascular: RRR, no murmurs, rubs, or gallops  Gastrointestinal: Positive bowel sounds,  Musculoskeletal: BLE UNNA boots/LE's wrapped, arm edema much better ,   neurologic: no abnl movements, reg resps   Skin: No rashes to exposed surfaces, diffuse bruising is healing slowly.  Still some edema of abdominal wall.     Results Reviewed:  LAB RESULTS:      Lab 22  0711 22  0927 22  1732 22  1627 22  0817   WBC 7.26 8.82  --   --  7.84   HEMOGLOBIN 9.0* 9.1*  --   --  9.4*   HEMATOCRIT 28.9* 29.7*  --   --  30.0*   PLATELETS 198 195  --   --  217   NEUTROS ABS  --  7.76*  --   --   --    EOS ABS  --  0.09  --   --   --    MCV 93.5 94.6  --   --  93.2   SED RATE  --   --   --   --  61*   CRP  --   --   --  11.59*  --    PROCALCITONIN  --   --   --  0.15  --    LACTATE  --  0.9  --   --   --    LDH  --   --   --  416*  --    D DIMER QUANT  --   --  2.39*  --   --          Lab 22  0708 22  1441 22  0711 22  0927  08/26/22  1627 08/26/22  0817   SODIUM 141 142 143 138  --  136   POTASSIUM 3.0* 3.8 3.3* 4.0  --  3.9   CHLORIDE 93* 96* 100 98  --  96*   CO2 33.0* 29.0 29.0 27.0  --  26.0   ANION GAP 15.0 17.0* 14.0 13.0  --  14.0   BUN 82* 83* 79* 78*  --  75*   CREATININE 1.33* 1.34* 1.48* 1.38*  --  1.38*   EGFR 39.8* 39.4* 35.0* 38.1*  --  38.1*   GLUCOSE 143* 135* 136* 293*  --  189*   CALCIUM 9.3 9.3 8.8 8.8  --  8.5*   MAGNESIUM 1.8  --   --   --  2.0  --    PHOSPHORUS  --   --  3.5 3.7  --   --          Lab 08/28/22  0711 08/27/22  0927   ALBUMIN 2.90* 3.00*         Lab 08/26/22  1627   PROBNP 7,181.0*   TROPONIN T 0.261*     Brief Urine Lab Results  (Last result in the past 365 days)      Color   Clarity   Blood   Leuk Est   Nitrite   Protein   CREAT   Urine HCG        08/23/22 0650 Yellow   Cloudy   Negative   Small (1+)   Negative   100 mg/dL (2+)               Microbiology Results Abnormal     Procedure Component Value - Date/Time    Blood Culture - Blood, Hand, Right [[id number]]  (Normal) Collected: 08/27/22 0927    Lab Status: Preliminary result Specimen: Blood from Hand, Right Updated: 08/31/22 0947     Blood Culture No growth at 4 days    Narrative:      Aerobic bottle only      COVID-19 and FLU A/B PCR - Swab, Nasopharynx [[id number]]  (Normal) Collected: 08/21/22 1156    Lab Status: Final result Specimen: Swab from Nasopharynx Updated: 08/21/22 1238     COVID19 Not Detected     Influenza A PCR Not Detected     Influenza B PCR Not Detected    Narrative:      Fact sheet for providers: https://www.fda.gov/media/722295/download    Fact sheet for patients: https://www.fda.gov/media/062906/download    Test performed by PCR.        No radiology results from the last 24 hrs    Results for orders placed during the hospital encounter of 08/21/22    Adult Transthoracic Echo Complete W/ Cont if Necessary Per Protocol    Interpretation Summary  · Estimated left ventricular EF = 55%  · Mild to moderate mitral valve  regurgitation is present.    I have reviewed the medications:  Scheduled Meds:amoxicillin-clavulanate, 1 tablet, Oral, Q12H  apixaban, 2.5 mg, Oral, BID  aspirin, 81 mg, Oral, Daily  atorvastatin, 40 mg, Oral, Daily  bumetanide, 3 mg, Oral, BID  diphenhydrAMINE, 25 mg, Oral, BID  diphenhydrAMINE, 50 mg, Oral, Nightly  ferrous sulfate, 325 mg, Oral, Daily With Breakfast  guaiFENesin, 1,200 mg, Oral, BID  hydrALAZINE, 25 mg, Oral, Q8H  insulin detemir, 15 Units, Subcutaneous, Daily  insulin lispro, 0-9 Units, Subcutaneous, TID AC  isosorbide dinitrate, 20 mg, Oral, BID  nystatin, , Topical, Q12H  oxymetazoline, 2 spray, Each Nare, Once  pharmacy consult - MTM, , Does not apply, Daily  potassium chloride, 40 mEq, Oral, Daily  senna-docusate sodium, 2 tablet, Oral, BID  sodium chloride, 10 mL, Intravenous, Q12H  spironolactone, 50 mg, Oral, Daily      Continuous Infusions:   PRN Meds:.•  acetaminophen **OR** [DISCONTINUED] acetaminophen **OR** [DISCONTINUED] acetaminophen  •  senna-docusate sodium **AND** polyethylene glycol **AND** bisacodyl **AND** bisacodyl  •  dextrose  •  dextrose  •  diphenhydrAMINE-zinc acetate  •  glucagon (human recombinant)  •  HYDROcodone-acetaminophen  •  nitroglycerin  •  oxymetazoline  •  sodium chloride  •  sodium chloride  •  sodium chloride    Assessment & Plan     Active Hospital Problems    Diagnosis  POA   • **Acute on chronic combined systolic and diastolic CHF (congestive heart failure) ([facility]) [I50.43]  Yes   • Atrial fibrillation ([facility]) [I48.91]  Yes   • Bradycardia [R00.1]  Yes   • Pressure injury of skin of sacral region [L89.159]  Yes   • LACEY (acute kidney injury) ([facility]) [N17.9]  Yes   • Anemia, chronic disease [D63.8]  Yes   • PVD (peripheral vascular disease) ([facility]) [I73.9]  Yes   • Diabetes mellitus with neuropathy ([facility]) [E11.40]  Yes   • CKD (chronic kidney disease), stage III ([facility]) [N18.30]  Yes   • Essential hypertension [I10]  Yes      Resolved Hospital Problems   No  resolved problems to display.     Brief Hospital Course to date:  Susan Anderson is a 82 y.o. female with PMH significant for HTN, HLD, CAD, non-ischemic cardiomyopathy, chronic combined systolic/diastolic CHF, CKD III, PVD, insulin-dependent DMII, ELIUD (on 2L NC QHS, chronic BLE edema, chronic osteomyelitis of right great toe (on chronic suppressive Augmentin per ID), and acoustic neuroma. She was admitted to [facility] from 7/26-8/12/22 for mild rhabdomyolysis after a fall at home. Also found to have a/c CHF exacerbation and LACEY. ID, nephrology, and cardiology followed. She discharged to [facility] for rehab on Bumex 1.5mg BID.     Patient evaluated by Alexandria Dior of Cardiology on 8/18/22 after her daughter called with reports of abdominal distension and worsening BLE edema with weeping of her legs in the setting of increased activity with therapy. Patient's Bumex was increased to 2mg BID at [facility], and compression wraps were applied to her BLE. She received 80mg IV Lasix in the heart failure clinic and was sent back to rehab with instructions to monitor urinary output. She was also noted to be in atrial fibrillation at this time and was started on Eliquis and Metoprolol.    3 days later she was sent to [facility] ED from [facility] -8/21/22 - with lethargy and worsening hypoxemia. Patient reported minimal urinary output since being seen at the heart failure clinic. In the ED, she was found to have an LACEY with evidence of hypervolemia, and bradycardia with a HR in the 40s.  Her troponin was elevated at this time. She was admitted for further work-up and management. Nephrology and cardiology follow.     Acute Hypercapnic respiratory failure secondary to acute on chronic combined systolic and diastolic CHF  Elevated troponins  Uncontrolled BP   - weaned to 2L nc oxygen , swelling much improved  - bumex now oral; pt stable and does not want any more IVs placed  - No  ACEi/ARB/Spironolactone or SGLT-2 due to renal dysfunction. No BB due to bradycardia.  Isordil for afterload reduction.    - Cardiology has evaluated, elevated troponin felt to be demand ischemia (Type II mechanism) in the setting of a/c CHF  - echo 8/27:  EF 55, mild-mod MVR  - - Stopped lyrica, avoid voltaren gel/cream  - 1000mL fluid restriction  - UNNA boots in place   - hypercapnic 9/1 but well compensated; no need for NIPPV.         LACEY on CKD IIIb  - Baseline creatinine appears to be 1.3-1.5  - Creatinine down to 1.1-1.2 prior to DC last admission   - no QUEENIE per prev duplex  - Creatinine improved with diuresis, now stable,    Dysphagia  Modified diet.       HTN  - Bumex as above  - Amlodipine discontinued due to edema  - Continue Hydralazine 25mg TID, Isordil 20mg BID     Scalp itch, chronic  - continue her usual Scalpicin  -  With scheduled benadryl she was confused last night.  Change to PRN.     - better on norco than percocet     Atrial fibrillation   Bradycardia  - Atrial fibrillation relatively new diagnosis - started on Metoprolol / Eliquis on 8/18; Metoprolol discontinued due to bradycardia (HR 40's) -  Continue renally-dosed Eliquis      Insulin-dependent DMII  Diabetic peripheral neuropathy  -  levemir to 15units daily       History of osteomyelitis R great toe  - Followed by Dr. Stef Pete of [facility] - has been on Augmentin for ~ 1 year , continue lifelong     Sacral pressure wound, POA - WOC following   BMI 45 - Complicates volume assessment and all aspects of care    Expected Discharge Location and Transportation: rehab  Expected Discharge Date: 9/2 or 9/3/22 - she wants to go to [facility] but has a bed hold at [facility]. Minimal participation with PT on 8/29.  Plan DC to [facility] tomorrow if stable today.     DVT prophylaxis:Medical DVT prophylaxis orders are present.   AM-PAC 6 Clicks Score (PT): 6 (08/30/22 8700)    CODE STATUS:   Code Status and Medical Interventions:   Ordered at: 08/27/22 0879      Medical Intervention Limits:    NO intubation (DNI)     Level Of Support Discussed With:    Patient    Health Care Surrogate     Code Status (Patient has no pulse and is not breathing):    No CPR (Do Not Attempt to Resuscitate)     Medical Interventions (Patient has pulse or is breathing):    Limited Support     Comments:    discussed with daughter at bedside     Kate Aguilar MD  09/01/22

## 2022-09-02 LAB
FLUAV SUBTYP SPEC NAA+PROBE: NOT DETECTED
FLUBV RNA ISLT QL NAA+PROBE: NOT DETECTED
GLUCOSE BLDC GLUCOMTR-MCNC: 199 MG/DL (ref 70–130)
GLUCOSE BLDC GLUCOMTR-MCNC: 222 MG/DL (ref 70–130)
GLUCOSE BLDC GLUCOMTR-MCNC: 316 MG/DL (ref 70–130)
MAGNESIUM SERPL-MCNC: 1.6 MG/DL (ref 1.6–2.4)
POTASSIUM SERPL-SCNC: 4.1 MMOL/L (ref 3.5–5.2)
SARS-COV-2 RNA PNL SPEC NAA+PROBE: NOT DETECTED

## 2022-09-02 PROCEDURE — 97110 THERAPEUTIC EXERCISES: CPT

## 2022-09-02 PROCEDURE — 87636 SARSCOV2 & INF A&B AMP PRB: CPT | Performed by: FAMILY MEDICINE

## 2022-09-02 PROCEDURE — 63710000001 INSULIN LISPRO (HUMAN) PER 5 UNITS: Performed by: INTERNAL MEDICINE

## 2022-09-02 PROCEDURE — 83735 ASSAY OF MAGNESIUM: CPT | Performed by: INTERNAL MEDICINE

## 2022-09-02 PROCEDURE — 84132 ASSAY OF SERUM POTASSIUM: CPT | Performed by: INTERNAL MEDICINE

## 2022-09-02 PROCEDURE — 82962 GLUCOSE BLOOD TEST: CPT

## 2022-09-02 PROCEDURE — 63710000001 DIPHENHYDRAMINE PER 50 MG: Performed by: INTERNAL MEDICINE

## 2022-09-02 PROCEDURE — 92526 ORAL FUNCTION THERAPY: CPT

## 2022-09-02 PROCEDURE — 97530 THERAPEUTIC ACTIVITIES: CPT

## 2022-09-02 PROCEDURE — 99232 SBSQ HOSP IP/OBS MODERATE 35: CPT | Performed by: FAMILY MEDICINE

## 2022-09-02 PROCEDURE — 63710000001 INSULIN DETEMIR PER 5 UNITS: Performed by: INTERNAL MEDICINE

## 2022-09-02 PROCEDURE — 94799 UNLISTED PULMONARY SVC/PX: CPT

## 2022-09-02 RX ADMIN — INSULIN DETEMIR 15 UNITS: 100 INJECTION, SOLUTION SUBCUTANEOUS at 08:43

## 2022-09-02 RX ADMIN — AMOXICILLIN AND CLAVULANATE POTASSIUM 1 TABLET: 875; 125 TABLET, FILM COATED ORAL at 21:56

## 2022-09-02 RX ADMIN — SENNOSIDES AND DOCUSATE SODIUM 2 TABLET: 50; 8.6 TABLET ORAL at 08:41

## 2022-09-02 RX ADMIN — GUAIFENESIN 1200 MG: 600 TABLET, EXTENDED RELEASE ORAL at 21:56

## 2022-09-02 RX ADMIN — HYDROCODONE BITARTRATE AND ACETAMINOPHEN 1 TABLET: 5; 325 TABLET ORAL at 18:08

## 2022-09-02 RX ADMIN — AMOXICILLIN AND CLAVULANATE POTASSIUM 1 TABLET: 875; 125 TABLET, FILM COATED ORAL at 08:43

## 2022-09-02 RX ADMIN — FERROUS SULFATE TAB 325 MG (65 MG ELEMENTAL FE) 325 MG: 325 (65 FE) TAB at 07:45

## 2022-09-02 RX ADMIN — DIPHENHYDRAMINE HYDROCHLORIDE 25 MG: 25 CAPSULE ORAL at 11:33

## 2022-09-02 RX ADMIN — Medication 400 MG: at 08:43

## 2022-09-02 RX ADMIN — ATORVASTATIN CALCIUM 40 MG: 40 TABLET, FILM COATED ORAL at 08:43

## 2022-09-02 RX ADMIN — ASPIRIN 81 MG: 81 TABLET, COATED ORAL at 08:40

## 2022-09-02 RX ADMIN — INSULIN LISPRO 4 UNITS: 100 INJECTION, SOLUTION INTRAVENOUS; SUBCUTANEOUS at 07:44

## 2022-09-02 RX ADMIN — NYSTATIN: 100000 POWDER TOPICAL at 08:40

## 2022-09-02 RX ADMIN — SENNOSIDES AND DOCUSATE SODIUM 2 TABLET: 50; 8.6 TABLET ORAL at 21:56

## 2022-09-02 RX ADMIN — HYDRALAZINE HYDROCHLORIDE 25 MG: 25 TABLET ORAL at 14:48

## 2022-09-02 RX ADMIN — BUMETANIDE 2 MG: 2 TABLET ORAL at 08:38

## 2022-09-02 RX ADMIN — APIXABAN 2.5 MG: 2.5 TABLET, FILM COATED ORAL at 21:56

## 2022-09-02 RX ADMIN — ISOSORBIDE DINITRATE 20 MG: 20 TABLET ORAL at 08:41

## 2022-09-02 RX ADMIN — APIXABAN 2.5 MG: 2.5 TABLET, FILM COATED ORAL at 08:40

## 2022-09-02 RX ADMIN — GUAIFENESIN 1200 MG: 600 TABLET, EXTENDED RELEASE ORAL at 08:42

## 2022-09-02 RX ADMIN — ISOSORBIDE DINITRATE 20 MG: 20 TABLET ORAL at 21:56

## 2022-09-02 RX ADMIN — INSULIN LISPRO 2 UNITS: 100 INJECTION, SOLUTION INTRAVENOUS; SUBCUTANEOUS at 11:40

## 2022-09-02 RX ADMIN — HYDRALAZINE HYDROCHLORIDE 25 MG: 25 TABLET ORAL at 21:56

## 2022-09-02 RX ADMIN — SPIRONOLACTONE 50 MG: 25 TABLET ORAL at 08:40

## 2022-09-02 RX ADMIN — POTASSIUM CHLORIDE 40 MEQ: 750 CAPSULE, EXTENDED RELEASE ORAL at 08:41

## 2022-09-02 RX ADMIN — INSULIN LISPRO 7 UNITS: 100 INJECTION, SOLUTION INTRAVENOUS; SUBCUTANEOUS at 17:59

## 2022-09-02 NOTE — THERAPY PROGRESS REPORT/RE-CERT
Patient Name: Susan Anderson  : 1940    MRN: [id number]                              Today's Date: 2022       Admit Date: 2022    Visit Dx:     ICD-10-CM ICD-9-CM   1. Acute on chronic congestive heart failure, unspecified heart failure type ([facility])  I50.9 428.0   2. Acute on chronic respiratory failure with hypoxia ([facility])  J96.21 518.84     799.02   3. Bradycardia  R00.1 427.89   4. Acute kidney injury ([facility])  N17.9 584.9   5. Anemia, unspecified type  D64.9 285.9   6. Elevated troponin  R77.8 790.6   7. Oral phase dysphagia  R13.11 787.21     Patient Active Problem List   Diagnosis   • Diabetic foot ulcer ([facility])   • PVD (peripheral vascular disease) ([facility])   • Osteomyelitis ([facility])   • Diabetes mellitus with neuropathy ([facility])   • Essential hypertension   • CKD (chronic kidney disease), stage III ([facility])   • Pyogenic inflammation of bone ([facility])   • Hyperglycemia   • Pneumonia due to infectious organism   • Acute hypoxemic respiratory failure ([facility])   • Mitral valve disease   • NICM (nonischemic cardiomyopathy) ([facility])   • Coronary artery disease involving native coronary artery of native heart without angina pectoris   • Dyslipidemia   • Chronic combined systolic and diastolic heart failure ([facility])   • Lumbar stenosis with neurogenic claudication   • Spondylosis of lumbar region without myelopathy or radiculopathy   • Spondylolisthesis, lumbar region   • Degeneration of lumbar or lumbosacral intervertebral disc   • Gait disturbance   • Physical deconditioning   • Sarcopenia   • Weakness   • Anemia, chronic disease   • Fall   • Dizziness   • Acoustic neuroma ([facility])   • Acute on chronic combined systolic and diastolic CHF (congestive heart failure) ([facility])   • LACEY (acute kidney injury) ([facility])   • Effusion of right knee   • Right knee pain   • Atrial fibrillation ([facility])   • Bradycardia   • Pressure injury of skin of sacral region     Past Medical History:   Diagnosis Date   • Arthritis    • Asthma  - double pneumonia     Currently on inhaler and nebulizer   • Atrial fibrillation (HCC) 8/21/2022   • Cancer (HCC)     cervical cancer, skin cancer   • CHF (congestive heart failure) (HCC) June 4, 2021   • Chronic kidney disease Related to diabetes   • Coronary artery disease 6/4/2021 DX for hear failure   • Diabetes mellitus (HCC) 30 years    Seeing Dr. Lam 1st time Aug 19   • Gout    • Hx of colonoscopy    • Hyperlipidemia Reference current labs x 2-3yrs approx   • Hypertension 30 years   • Migraine    • Mitral valve disease    • Mitral valve disease    • Osteomyelitis (HCC)    • Peripheral neuropathy    • Sleep apnea    • Type 2 diabetes mellitus (HCC)     30 years     Past Surgical History:   Procedure Laterality Date   • ABDOMINAL HYSTERECTOMY W/SALPINGECTOMY     • AMPUTATION  Right great toe, 1st 1/3 metatarsal -Reg 4/14/21   • AORTAGRAM N/A 4/9/2021    Procedure: AORTAGRAM WITH OR WITHOUT RUNOFFS, WITH Co2;  Surgeon: Trey Forrest MD;  Location: [facility];  Service: Vascular;  Laterality: N/A;   • APPENDECTOMY     • BRAIN TUMOR EXCISION      laser surgery    • CARDIAC CATHETERIZATION N/A 6/21/2021    Procedure: LEFT HEART CATH;  Surgeon: Anjum Ceballos MD;  Location:  [facility];  Service: Cardiology;  Laterality: N/A;   • CATARACT EXTRACTION W/ INTRAOCULAR LENS  IMPLANT, BILATERAL     • CHOLECYSTECTOMY     • EYE SURGERY     • HYSTERECTOMY     • TOE SURGERY     • TRANS METATARSAL AMPUTATION Right 4/14/2021    Procedure: AMPUTATION TRANS METATARSAL RIGHT GREAT TOE;  Surgeon: Valdez Finney MD;  Location: [facility] OR;  Service: Vascular;  Laterality: Right;      General Information     Row Name 09/02/22 0833          OT Time and Intention    Document Type progress note/recertification  -KF     Mode of Treatment occupational therapy  -KF     Row Name 09/02/22 0833          General Information    Patient Profile Reviewed yes  -KF     Existing Precautions/Restrictions oxygen therapy device and  L/min;fall;other (see comments)  AMS, delicate skin integrity  -KF     Barriers to Rehab medically complex;cognitive status  -KF     Row Name 09/02/22 0833          Cognition    Orientation Status (Cognition) oriented to;person;unable/difficult to assess  Pt with minimal purposeful verbalizations  -KF     Row Name 09/02/22 0833          Safety Issues, Functional Mobility    Safety Issues Affecting Function (Mobility) insight into deficits/self-awareness;awareness of need for assistance;safety precaution awareness;problem-solving;ability to follow commands  -KF     Impairments Affecting Function (Mobility) balance;endurance/activity tolerance;coordination;pain;postural/trunk control;shortness of breath;strength  -KF           User Key  (r) = Recorded By, (t) = Taken By, (c) = Cosigned By    Initials Name Provider Type    KF Edwina Leal, OT Occupational Therapist               Lymphedema     Row Name 08/31/22 0915             Lymphedema Edema Assessment    Ptting Edema Category By severity  -MF      Pitting Edema Mild  -MF      Recorded by [MF] Avtar Ayala, PT              Compression/Skin Care    Compression/Skin Care skin care;wrapping location;bandaging  -MF      Skin Care washed/dried;lotion applied  -MF      Wrapping Location lower extremity  -MF      Wrapping Location LE bilateral:;foot to knee  -MF      Wrapping Comments optifoam Ag to L ant foot. unna boot applied in clamshell fashion with kerlix to secure.  -MF      Recorded by [MF] Avtar Ayala, PT            User Key  (r) = Recorded By, (t) = Taken By, (c) = Cosigned By    Initials Name Effective Dates     Avtar Ayala, PT 06/16/21 -                Mobility/ADL's     Row Name 09/02/22 0834          Bed Mobility    Bed Mobility rolling left;rolling right;supine-sit;scooting/bridging  -KF     Rolling Left [place] (Bed Mobility) dependent (less than 25% patient effort);2 person assist;verbal cues  -KF     Rolling Right  [place] (Bed Mobility) dependent (less than 25% patient effort);2 person assist;verbal cues  -KF     Scooting/Bridging [place] (Bed Mobility) dependent (less than 25% patient effort);2 person assist;verbal cues  -KF     Supine-Sit [place] (Bed Mobility) dependent (less than 25% patient effort);2 person assist;1 person assist;verbal cues  -KF     Bed Mobility, Safety Issues cognitive deficits limit understanding;decreased use of arms for pushing/pulling;decreased use of legs for bridging/pushing;impaired trunk control for bed mobility  -     Assistive Device (Bed Mobility) draw sheet;head of bed elevated;bed rails  -     Comment, (Bed Mobility) able to participate in grooming and bathing tasks sitting EOB with maxA and maxA for balance; cues throughout and tactile cues for hand placement on bed rail to progress at times  -     Row Name 09/02/22 0834          Transfers    Comment, (Transfers) deferred to PT  -     Sit-Stand [place] (Transfers) unable to assess  -     Row Name 09/02/22 0834          Functional Mobility    Functional Mobility- Ind. Level unable to perform  -     Row Name 09/02/22 0834          Activities of Daily Living    BADL Assessment/Intervention bathing;upper body dressing;grooming;lower body dressing  -     Row Name 09/02/22 0834          Lower Body Dressing Assessment/Training    [place] Level (Lower Body Dressing) don;doff;socks;dependent (less than 25% patient effort)  -     Position (Lower Body Dressing) sitting up in bed  -     Row Name 09/02/22 0834          Toileting Assessment/Training    [place] Level (Toileting) toileting skills;dependent (less than 25% patient effort);change pad/brief;perform perineal hygiene  -     Position (Toileting) edge of bed sitting;supine  -     Comment, (Toileting) using purewick due to urinary incont; rolled for clean linens and pad change  -     Row Name 09/02/22 0834          Grooming Assessment/Training     [place] Level (Grooming) wash face, hands;maximum assist (25% patient effort)  -KF     Assistive Devices (Grooming) hand over hand  -KF     Position (Grooming) edge of bed sitting  -KF     Row Name 09/02/22 0834          Bathing Assessment/Intervention    [place] Level (Bathing) upper body;upper extremities;chest/trunk;dependent (less than 25% patient effort)  maxA for balance in sitting during completion of bathing  -KF     Position (Bathing) edge of bed sitting  -KF     Row Name 09/02/22 0834          Upper Body Dressing Assessment/Training    [place] Level (Upper Body Dressing) doff;don;front opening garment;dependent (less than 25% patient effort)  -KF     Position (Upper Body Dressing) edge of bed sitting  -KF     Comment, (Upper Body Dressing) with maxA for balance seated EOB and max vc's to participate in threading UEs  -KF           User Key  (r) = Recorded By, (t) = Taken By, (c) = Cosigned By    Initials Name Provider Type    Edwina Recinos OT Occupational Therapist               Obj/Interventions     Row Name 09/02/22 0840          Balance    Balance Assessment sitting static balance;sitting dynamic balance;standing static balance;standing dynamic balance  -KF     Static Sitting Balance maximum assist;1 person to manage equipment;verbal cues  -KF     Dynamic Sitting Balance maximum assist;2-person assist;verbal cues  -KF     Position, Sitting Balance unsupported;sitting edge of bed  -KF     Balance Interventions sitting;occupation based/functional task;UE activity with balance activity  -KF     Comment, Balance tolerated EOB sitting with assist due to lateral lean towards HOB on R side  -KF           User Key  (r) = Recorded By, (t) = Taken By, (c) = Cosigned By    Initials Name Provider Type    Edwina Recinos OT Occupational Therapist               Goals/Plan     Row Name 09/02/22 0844          Bed Mobility Goal 1 (OT)    Activity/Assistive Device (Bed Mobility Goal 1,  OT) sit to supine/supine to sit;scooting  -KF     [place] Level/Cues Needed (Bed Mobility Goal 1, OT) moderate assist (50-74% patient effort);verbal cues required  -KF     Time Frame (Bed Mobility Goal 1, OT) by discharge;long term goal (LTG)  -KF     Progress/Outcomes (Bed Mobility Goal 1, OT) progress slower than expected  -KF     Row Name 09/02/22 0844          Transfer Goal 1 (OT)    Activity/Assistive Device (Transfer Goal 1, OT) sit-to-stand/stand-to-sit;bed-to-chair/chair-to-bed;toilet  -KF     [place] Level/Cues Needed (Transfer Goal 1, OT) minimum assist (75% or more patient effort);verbal cues required  -KF     Time Frame (Transfer Goal 1, OT) by discharge;long term goal (LTG)  -KF     Progress/Outcome (Transfer Goal 1, OT) progress slower than expected  -KF     Row Name 09/02/22 0844          Toileting Goal 1 (OT)    Activity/Device (Toileting Goal 1, OT) adjust/manage clothing;perform perineal hygiene  -KF     [place] Level/Cues Needed (Toileting Goal 1, OT) maximum assist (25-49% patient effort);verbal cues required  -KF     Time Frame (Toileting Goal 1, OT) by discharge;long term goal (LTG)  -KF     Progress/Outcome (Toileting Goal 1, OT) continuing progress toward goal  -KF     Row Name 09/02/22 0844          Grooming Goal 1 (OT)    Activity/Device (Grooming Goal 1, OT) hair care;oral care;wash face, hands  -KF     [place] (Grooming Goal 1, OT) verbal cues required;minimum assist (75% or more patient effort)  -KF     Time Frame (Grooming Goal 1, OT) by discharge;long term goal (LTG)  -KF     Progress/Outcome (Grooming Goal 1, OT) progress slower than expected;continuing progress toward goal  -KF           User Key  (r) = Recorded By, (t) = Taken By, (c) = Cosigned By    Initials Name Provider Type    KF Edwina Leal OT Occupational Therapist               Clinical Impression     Row Name 09/02/22 0841          Pain Assessment    Additional Documentation Pain Scale: FACES  Pre/Post-Treatment (Group)  -     Row Name 09/02/22 0841          Pain Scale: FACES Pre/Post-Treatment    Pain: FACES Scale, Pretreatment 4-->hurts little more  -KF     Posttreatment Pain Rating 4-->hurts little more  -KF     Pain Location generalized  -KF     Pre/Posttreatment Pain Comment Pt unable to verbalize pain or location  -     Row Name 09/02/22 0841          Plan of Care Review    Plan of Care Reviewed With patient  -KF     Progress no change  -KF     Outcome Evaluation Pt completed bed mob rolling for toileting needs depAx2, depAx2 supine to sit, depA LBD, depA with maxA for sitting balance EOB for completion and participation in bathing, grooming, and UBD depA with max cues to progress participation, able to wash face with hand over hand assist progressed to maxA sitting EOB, cont IPOT per POC recom SNF at d/c  -     Row Name 09/02/22 0841          Therapy Assessment/Plan (OT)    Rehab Potential (OT) fair, will monitor progress closely  -     Criteria for Skilled Therapeutic Interventions Met (OT) yes;skilled treatment is necessary  -     Therapy Frequency (OT) 3 times/wk  -     Row Name 09/02/22 0841          Therapy Plan Review/Discharge Plan (OT)    Anticipated Discharge Disposition (OT) skilled nursing facility  -     Row Name 09/02/22 0841          Vital Signs    Pre Systolic BP Rehab 158  RN cleared VSS  -KF     Pre Treatment Diastolic BP 80  -KF     Pre SpO2 (%) 92  -KF     O2 Delivery Pre Treatment supplemental O2  -KF     Intra SpO2 (%) 90  -KF     O2 Delivery Intra Treatment supplemental O2  -KF     Post SpO2 (%) 92  -KF     O2 Delivery Post Treatment supplemental O2  -KF     Pre Patient Position Supine  -KF     Intra Patient Position Sitting  -KF     Post Patient Position Sitting  -KF     Row Name 09/02/22 0841          Positioning and Restraints    Pre-Treatment Position in bed  -KF     Post Treatment Position bed  -KF     In Bed notified nsg;sitting EOB;with PT;with other  staff;call light within reach;encouraged to call for assist;with nsg  -KF           User Key  (r) = Recorded By, (t) = Taken By, (c) = Cosigned By    Initials Name Provider Type    Edwina Recinos OT Occupational Therapist               Outcome Measures     Row Name 09/02/22 0845          How much help from another is currently needed...    Putting on and taking off regular lower body clothing? 1  -KF     Bathing (including washing, rinsing, and drying) 1  -KF     Toileting (which includes using toilet bed pan or urinal) 1  -KF     Putting on and taking off regular upper body clothing 2  -KF     Taking care of personal grooming (such as brushing teeth) 2  -KF     Eating meals 2  -KF     AM-PAC 6 Clicks Score (OT) 9  -KF     Row Name 09/02/22 0845          Functional Assessment    Outcome Measure Options AM-PAC 6 Clicks Daily Activity (OT)  -KF           User Key  (r) = Recorded By, (t) = Taken By, (c) = Cosigned By    Initials Name Provider Type    Edwina Recinos OT Occupational Therapist                Occupational Therapy Education                 Title: PT OT SLP Therapies (In Progress)     Topic: Occupational Therapy (In Progress)     Point: ADL training (In Progress)     Description:   Instruct learner(s) on proper safety adaptation and remediation techniques during self care or transfers.   Instruct in proper use of assistive devices.              Learning Progress Summary           Patient Acceptance, E,TB,D, NR,NL by KRYSTAL at 9/2/2022 0845    Acceptance, E,TB,D, VU,NR by  at 8/24/2022 1416    Acceptance, E,TB,D, VU,NR,NL by  at 8/22/2022 1315                   Point: Home exercise program (In Progress)     Description:   Instruct learner(s) on appropriate technique for monitoring, assisting and/or progressing therapeutic exercises/activities.              Learning Progress Summary           Patient Acceptance, E, NR by CHARLINE at 8/31/2022 1136    Acceptance, E, VU by CHARLINE at 8/26/2022 1106   Family  Acceptance, E, NR by  at 8/31/2022 1136    Acceptance, E, VU by  at 8/26/2022 1106                   Point: Precautions (In Progress)     Description:   Instruct learner(s) on prescribed precautions during self-care and functional transfers.              Learning Progress Summary           Patient Acceptance, E,TB,D, NR,NL by KF at 9/2/2022 0845    Acceptance, E, VU by  at 8/26/2022 1106    Acceptance, E,TB,D, VU,NR by  at 8/24/2022 1416    Acceptance, E,TB,D, VU,NR,NL by  at 8/22/2022 1315   Family Acceptance, E, VU by  at 8/26/2022 1106                   Point: Body mechanics (In Progress)     Description:   Instruct learner(s) on proper positioning and spine alignment during self-care, functional mobility activities and/or exercises.              Learning Progress Summary           Patient Acceptance, E,TB,D, NR,NL by KF at 9/2/2022 0845    Acceptance, E, NR by  at 8/31/2022 1136    Acceptance, E,TB,D, VU,NR by  at 8/24/2022 1416    Acceptance, E,TB,D, VU,NR,NL by  at 8/22/2022 1315   Family Acceptance, E, NR by  at 8/31/2022 1136                               User Key     Initials Effective Dates Name Provider Type Discipline     06/16/21 -  Padmaja Jenkins, OT Occupational Therapist OT     06/16/21 -  Edwina Leal OT Occupational Therapist OT     06/16/21 -  Cori Pope OT Occupational Therapist OT              OT Recommendation and Plan  Therapy Frequency (OT): 3 times/wk  Plan of Care Review  Plan of Care Reviewed With: patient  Progress: no change  Outcome Evaluation: Pt completed bed mob rolling for toileting needs depAx2, depAx2 supine to sit, depA LBD, depA with maxA for sitting balance EOB for completion and participation in bathing, grooming, and UBD depA with max cues to progress participation, able to wash face with hand over hand assist progressed to maxA sitting EOB, cont IPOT per POC recom SNF at d/c     Time Calculation:    Time Calculation- OT     Row Name 09/02/22  0806             Time Calculation- OT    OT Start Time 0806  -KF      OT Received On 09/02/22  -KF      OT Goal Re-Cert Due Date 09/12/22  -KF              Timed Charges    88266 - OT Therapeutic Activity Minutes 10  -KF      60518 - OT Self Care/Mgmt Minutes 8  -KF              Total Minutes    Timed Charges Total Minutes 18  -KF       Total Minutes 18  -KF            User Key  (r) = Recorded By, (t) = Taken By, (c) = Cosigned By    Initials Name Provider Type     Edwina Leal OT Occupational Therapist              Therapy Charges for Today     Code Description Service Date Service Provider Modifiers Qty    99004291962  OT THERAPEUTIC ACT EA 15 MIN 9/2/2022 Edwina Leal OT GO 1    84642775047 HC OT THER SUPP EA 15 MIN 9/2/2022 Edwina Leal OT GO 1               Edwina Leal OT  9/2/2022

## 2022-09-02 NOTE — CASE MANAGEMENT/SOCIAL WORK
Case Management Discharge Note      Final Note: Received a call from liaison with [facility] who advises they have received insurance approval.  RN notified.  Spoke with patients daughter, Katie, to advise and reminded that ambulance transport is scheduled for tomorrow at 1130 who verbalizes understanding and agreement with plan.  MD notified.  Patient plan is to discharge to [facility] Sat 9/3 via  Ambulance scheduled for 1130.  Nursing to call report to [phone].  Discharge Summary to be faxed to [phone].         Selected Continued Care - Admitted Since 8/21/2022     Destination Coordination complete.    Service Provider Selected Services Address Phone Fax Patient Preferred    [facility]  Skilled Nursing [address] [place], [place] [id number] [phone] --          Durable Medical Equipment    No services have been selected for the patient.              Dialysis/Infusion    No services have been selected for the patient.              Home Medical Care    No services have been selected for the patient.              Therapy    No services have been selected for the patient.              Community Resources    No services have been selected for the patient.              Community & DME    No services have been selected for the patient.                Selected Continued Care - Prior Encounters Includes selections from prior encounters from 5/23/2022 to 9/2/2022    Discharged on 8/12/2022 Admission date: 7/26/2022 - Discharge disposition: Skilled Nursing Facility (DC - External)    Destination     Service Provider Selected Services Address Phone Fax Patient Pref[facility]    [facility]  Skilled Nursing [address] [place] [address] [phone] --                         Final Discharge Disposition Code: 03 - skilled nursing facility (SNF)

## 2022-09-02 NOTE — PROGRESS NOTES
[facility] Medicine Services  PROGRESS NOTE    Patient Name: Susan Anderson  : 1940  MRN: [id number]    Date of Admission: 2022  Primary Care Physician: Arleen Doherty MD    Subjective     CC: f/u AC CHF     HPI:   Pt is very sleepy. Denies any pain.     ROS:  Gen- No fevers, chills  CV- No chest pain, palpitations  Resp- No cough, dyspnea  GI- No N/V/D, abd pain        Objective     Vital Signs:   Temp:  [96.8 °F (36 °C)-98.8 °F (37.1 °C)] 96.8 °F (36 °C)  Heart Rate:  [85-99] 85  Resp:  [16-20] 20  BP: (143-158)/(74-89) 158/80  Flow (L/min):  [2-3] 3     Physical Exam:  Constitutional: No acute distress, elderly female resting in bed   HENT: NCAT, mucous membranes moist  Respiratory: Clear to auscultation bilaterally, respiratory effort normal   Cardiovascular: RRR, no murmurs, rubs, or gallops  Gastrointestinal: Positive bowel sounds, soft, nontender, nondistended  Musculoskeletal: LE wrapped   Psychiatric: cooperative  Neurologic: moves all four extremities equally, speech clear  Skin: No rashes        Results Reviewed:  LAB RESULTS:      Lab 22  0711 22  0927 22  1732 22  1627   WBC 7.26 8.82  --   --    HEMOGLOBIN 9.0* 9.1*  --   --    HEMATOCRIT 28.9* 29.7*  --   --    PLATELETS 198 195  --   --    NEUTROS ABS  --  7.76*  --   --    EOS ABS  --  0.09  --   --    MCV 93.5 94.6  --   --    CRP  --   --   --  11.59*   PROCALCITONIN  --   --   --  0.15   LACTATE  --  0.9  --   --    LDH  --   --   --  416*   D DIMER QUANT  --   --  2.39*  --          Lab 22  0705 22  0023 22  0925 22  0708 22  1441 22  0711 22  1627   SODIUM  --   --  139 141 142 143  --  138  --    POTASSIUM  --  4.1 3.5 3.0* 3.8 3.3*   < > 4.0  --    CHLORIDE  --   --  91* 93* 96* 100  --  98  --    CO2  --   --  37.0* 33.0* 29.0 29.0  --  27.0  --    ANION GAP  --   --  11.0 15.0 17.0* 14.0  --  13.0  --     BUN  --   --  74* 82* 83* 79*  --  78*  --    CREATININE  --   --  1.37* 1.33* 1.34* 1.48*  --  1.38*  --    EGFR  --   --  38.4* 39.8* 39.4* 35.0*  --  38.1*  --    GLUCOSE  --   --  178* 143* 135* 136*  --  293*  --    CALCIUM  --   --  9.2 9.3 9.3 8.8  --  8.8  --    MAGNESIUM 1.6  --  1.8 1.8  --   --   --   --  2.0   PHOSPHORUS  --   --  3.6  --   --  3.5  --  3.7  --     < > = values in this interval not displayed.         Lab 09/01/22  0925 08/28/22  0711 08/27/22  0927   ALBUMIN 3.20* 2.90* 3.00*         Lab 08/26/22  1627   PROBNP 7,181.0*   TROPONIN T 0.261*     Brief Urine Lab Results  (Last result in the past 365 days)      Color   Clarity   Blood   Leuk Est   Nitrite   Protein   CREAT   Urine HCG        08/23/22 0650 Yellow   Cloudy   Negative   Small (1+)   Negative   100 mg/dL (2+)               Microbiology Results Abnormal     Procedure Component Value - Date/Time    Blood Culture - Blood, Hand, Right [[id number]]  (Normal) Collected: 08/27/22 0927    Lab Status: Final result Specimen: Blood from Hand, Right Updated: 09/01/22 0948     Blood Culture No growth at 5 days    Narrative:      Aerobic bottle only      COVID-19 and FLU A/B PCR - Swab, Nasopharynx [[id number]]  (Normal) Collected: 08/21/22 1156    Lab Status: Final result Specimen: Swab from Nasopharynx Updated: 08/21/22 1238     COVID19 Not Detected     Influenza A PCR Not Detected     Influenza B PCR Not Detected    Narrative:      Fact sheet for providers: https://www.fda.gov/media/038529/download    Fact sheet for patients: https://www.fda.gov/media/451640/download    Test performed by PCR.        No radiology results from the last 24 hrs    Results for orders placed during the hospital encounter of 08/21/22    Adult Transthoracic Echo Complete W/ Cont if Necessary Per Protocol    Interpretation Summary  · Estimated left ventricular EF = 55%  · Mild to moderate mitral valve regurgitation is present.    I have reviewed the  medications:  Scheduled Meds:amoxicillin-clavulanate, 1 tablet, Oral, Q12H  apixaban, 2.5 mg, Oral, BID  aspirin, 81 mg, Oral, Daily  atorvastatin, 40 mg, Oral, Daily  bumetanide, 2 mg, Oral, Daily  ferrous sulfate, 325 mg, Oral, Daily With Breakfast  guaiFENesin, 1,200 mg, Oral, BID  hydrALAZINE, 25 mg, Oral, Q8H  insulin detemir, 15 Units, Subcutaneous, Daily  insulin lispro, 0-9 Units, Subcutaneous, TID AC  isosorbide dinitrate, 20 mg, Oral, BID  magnesium oxide, 400 mg, Oral, Daily  nystatin, , Topical, Q12H  oxymetazoline, 2 spray, Each Nare, Once  pharmacy consult - MTM, , Does not apply, Daily  potassium chloride, 40 mEq, Oral, Daily  senna-docusate sodium, 2 tablet, Oral, BID  sodium chloride, 10 mL, Intravenous, Q12H  spironolactone, 50 mg, Oral, Daily      Continuous Infusions:   PRN Meds:.•  acetaminophen **OR** [DISCONTINUED] acetaminophen **OR** [DISCONTINUED] acetaminophen  •  senna-docusate sodium **AND** polyethylene glycol **AND** bisacodyl **AND** bisacodyl  •  dextrose  •  dextrose  •  diphenhydrAMINE  •  diphenhydrAMINE-zinc acetate  •  glucagon (human recombinant)  •  HYDROcodone-acetaminophen  •  nitroglycerin  •  ondansetron  •  oxymetazoline  •  potassium chloride **OR** potassium chloride **OR** potassium chloride  •  sodium chloride  •  sodium chloride  •  sodium chloride    Assessment & Plan     Active Hospital Problems    Diagnosis  POA   • **Acute on chronic combined systolic and diastolic CHF (congestive heart failure) ([facility]) [I50.43]  Yes   • Atrial fibrillation (HCC) [I48.91]  Yes   • Bradycardia [R00.1]  Yes   • Pressure injury of skin of sacral region [L89.159]  Yes   • LACEY (acute kidney injury) ([facility]) [N17.9]  Yes   • Anemia, chronic disease [D63.8]  Yes   • PVD (peripheral vascular disease) ([facility]) [I73.9]  Yes   • Diabetes mellitus with neuropathy ([facility]) [E11.40]  Yes   • CKD (chronic kidney disease), stage III (HCC) [N18.30]  Yes   • Essential hypertension [I10]  Yes      Resolved  Hospital Problems   No resolved problems to display.     Brief Hospital Course to date:  Susan Anderson is a 82 y.o. female with PMH significant for HTN, HLD, CAD, non-ischemic cardiomyopathy, chronic combined systolic/diastolic CHF, CKD III, PVD, insulin-dependent DMII, ELIUD (on 2L NC QHS, chronic BLE edema, chronic osteomyelitis of right great toe (on chronic suppressive Augmentin per ID), and acoustic neuroma. She was admitted to [facility] from 7/26-8/12/22 for mild rhabdomyolysis after a fall at home. Also found to have a/c CHF exacerbation and LACEY. ID, nephrology, and cardiology followed. She discharged to the [facility] for rehab on Bumex 1.5mg BID.     Patient evaluated by Alexandria Dior of Cardiology on 8/18/22 after her daughter called with reports of abdominal distension and worsening BLE edema with weeping of her legs in the setting of increased activity with therapy. Patient's Bumex was increased to 2mg BID at [facility], and compression wraps were applied to her BLE. She received 80mg IV Lasix in the heart failure clinic and was sent back to rehab with instructions to monitor urinary output. She was also noted to be in atrial fibrillation at this time and was started on Eliquis and Metoprolol.    3 days later she was sent to [facility] ED from [facility] -8/21/22 - with lethargy and worsening hypoxemia. Patient reported minimal urinary output since being seen at the heart failure clinic. In the ED, she was found to have an LACEY with evidence of hypervolemia, and bradycardia with a HR in the 40s.  Her troponin was elevated at this time. She was admitted for further work-up and management. Nephrology and cardiology follow.   This patient's problems and plans were partially entered by my partner and updated as appropriate by me 09/02/22.        Acute Hypercapnic respiratory failure secondary to acute on chronic combined systolic and diastolic CHF  Uncontrolled BP   - bumex now  oral;  - No ACEi/ARB/Spironolactone or SGLT-2 due to renal dysfunction. No BB due to bradycardia.  Isordil for afterload reduction.    - Cardiology has evaluated, elevated troponin felt to be demand ischemia (Type II mechanism) in the setting of a/c CHF  - echo 8/27:  EF 55, mild-mod MVR  - - Stopped lyrica, avoid voltaren gel/cream  - 1000mL fluid restriction  - UNNA boots in place          LACEY on CKD IIIb  - Baseline creatinine appears to be 1.3-1.5  - Creatinine down to 1.1-1.2 prior to DC last admission   - no QUEENIE per prev duplex  - Creatinine improved with diuresis, now stable at 1.37    Dysphagia  Modified diet.       HTN  - Bumex as above  - Amlodipine discontinued due to edema  - Continue Hydralazine 25mg TID, Isordil 20mg BID     Scalp itch, chronic  - continue her usual Scalpicin  -  Benadryl   Changed to PRN.     - better on norco than percocet     Atrial fibrillation   Bradycardia  - Atrial fibrillation relatively new diagnosis - started on Metoprolol / Eliquis on 8/18; Metoprolol discontinued due to bradycardia (HR 40's) -  Continue renally-dosed Eliquis      Insulin-dependent DMII  Diabetic peripheral neuropathy  -  levemir to 15units daily       History of osteomyelitis R great toe  - Followed by Dr. Stef Pete of [facility] - has been on Augmentin for ~ 1 year , continue lifelong     Sacral pressure wound, POA - WOC following   BMI 45 - Complicates volume assessment and all aspects of care    Expected Discharge Location and Transportation: rehab  Expected Discharge Date: to rehab at the [facility], tomorrow     DVT prophylaxis:Medical DVT prophylaxis orders are present.   AM-PAC 6 Clicks Score (PT): 6 (09/01/22 1006)    CODE STATUS:   Code Status and Medical Interventions:   Ordered at: 08/27/22 1306     Medical Intervention Limits:    NO intubation (DNI)     Level Of Support Discussed With:    Patient    Health Care Surrogate     Code Status (Patient has no pulse and is not breathing):    No CPR (Do Not  Attempt to Resuscitate)     Medical Interventions (Patient has pulse or is breathing):    Limited Support     Comments:    discussed with daughter at bedside     Sheron Anthony, DO  09/02/22

## 2022-09-02 NOTE — PLAN OF CARE
Goal Outcome Evaluation:  Plan of Care Reviewed With: patient        Progress: no change  Outcome Evaluation: Pt completed bed mob rolling for toileting needs depAx2, depAx2 supine to sit, depA LBD, depA with maxA for sitting balance EOB for completion and participation in bathing, grooming, and UBD depA with max cues to progress participation, able to wash face with hand over hand assist progressed to maxA sitting EOB, cont IPOT per POC recom SNF at d/c

## 2022-09-02 NOTE — PROGRESS NOTES
"   LOS: 12 days    Patient Care Team:  Arleen Doherty MD as PCP - General (Internal Medicine)  Flory Sauer APRN (Nurse Practitioner)  Janeth Lam MD as Consulting Physician (Endocrinology)  Anjum Ceballos MD as Consulting Physician (Cardiology)    Reason For Visit:  F/U LACEY ON CKD  Subjective           Review of Systems:    Pulm: No soa   CV:  No CP      Objective     amoxicillin-clavulanate, 1 tablet, Oral, Q12H  apixaban, 2.5 mg, Oral, BID  aspirin, 81 mg, Oral, Daily  atorvastatin, 40 mg, Oral, Daily  bumetanide, 2 mg, Oral, Daily  ferrous sulfate, 325 mg, Oral, Daily With Breakfast  guaiFENesin, 1,200 mg, Oral, BID  hydrALAZINE, 25 mg, Oral, Q8H  insulin detemir, 15 Units, Subcutaneous, Daily  insulin lispro, 0-9 Units, Subcutaneous, TID AC  isosorbide dinitrate, 20 mg, Oral, BID  magnesium oxide, 400 mg, Oral, Daily  nystatin, , Topical, Q12H  oxymetazoline, 2 spray, Each Nare, Once  pharmacy consult - MTM, , Does not apply, Daily  potassium chloride, 40 mEq, Oral, Daily  senna-docusate sodium, 2 tablet, Oral, BID  sodium chloride, 10 mL, Intravenous, Q12H  spironolactone, 50 mg, Oral, Daily             Vital Signs:  Blood pressure 151/91, pulse 89, temperature 96.8 °F (36 °C), temperature source Axillary, resp. rate 18, height 162.6 cm (64\"), weight 99.2 kg (218 lb 11.2 oz), SpO2 92 %.    Flowsheet Rows    Flowsheet Row First Filed Value   Admission Height 162.6 cm (64\") Documented at 08/21/2022 1103   Admission Weight 102 kg (224 lb) Documented at 08/21/2022 1103          09/01 0701 - 09/02 0700  In: -   Out: 1350 [Urine:1350]    Physical Exam:    General Appearance: NAD, alert and cooperative, Ox3  Eyes: PER, conjunctivae and sclerae normal, no icterus  Lungs: respirations regular and unlabored, no crepitus, clear to auscultation  Heart/CV: regular rhythm & normal rate, no murmur, no gallop, no rub and no edema  Abdomen: not distended, soft, non-tender, no masses,  bowel sounds " present  Skin: No rash, Warm and dry    Radiology:      Renal Imaging:        Labs:  Results from last 7 days   Lab Units 08/28/22  0711 08/27/22  0927   WBC 10*3/mm3 7.26 8.82   HEMOGLOBIN g/dL 9.0* 9.1*   HEMATOCRIT % 28.9* 29.7*   PLATELETS 10*3/mm3 198 195     Results from last 7 days   Lab Units 09/02/22  0705 09/02/22  0023 09/01/22  0925 08/30/22  0708 08/29/22  1441 08/28/22  0711 08/27/22  0927 08/27/22  0927 08/26/22  1627   SODIUM mmol/L  --   --  139 141 142 143   < > 138  --    POTASSIUM mmol/L  --  4.1 3.5 3.0* 3.8 3.3*   < > 4.0  --    CHLORIDE mmol/L  --   --  91* 93* 96* 100   < > 98  --    CO2 mmol/L  --   --  37.0* 33.0* 29.0 29.0   < > 27.0  --    BUN mg/dL  --   --  74* 82* 83* 79*   < > 78*  --    CREATININE mg/dL  --   --  1.37* 1.33* 1.34* 1.48*   < > 1.38*  --    CALCIUM mg/dL  --   --  9.2 9.3 9.3 8.8   < > 8.8  --    PHOSPHORUS mg/dL  --   --  3.6  --   --  3.5  --  3.7  --    MAGNESIUM mg/dL 1.6  --  1.8 1.8  --   --   --   --  2.0   ALBUMIN g/dL  --   --  3.20*  --   --  2.90*  --  3.00*  --     < > = values in this interval not displayed.     Results from last 7 days   Lab Units 09/01/22  0925   GLUCOSE mg/dL 178*           Results from last 7 days   Lab Units 09/01/22  1205   PH, ARTERIAL pH units 7.437   PO2 ART mm Hg 76.0*   PCO2, ARTERIAL mm Hg 60.3*   HCO3 ART mmol/L 40.6*             Estimated Creatinine Clearance: 35.6 mL/min (A) (by C-G formula based on SCr of 1.37 mg/dL (H)).      Assessment       Acute on chronic combined systolic and diastolic CHF (congestive heart failure) (HCC)    PVD (peripheral vascular disease) (HCC)    Diabetes mellitus with neuropathy (HCC)    Essential hypertension    CKD (chronic kidney disease), stage III (HCC)    Anemia, chronic disease    LACEY (acute kidney injury) (HCC)    Atrial fibrillation (HCC)    Bradycardia    Pressure injury of skin of sacral region            Impression: LACEY ON STAGE 3 CKD. STABLE GFR. CHF IS BETTER. ANEMIA.              Recommendations: TO REHAB 9/3/22. NAL F/U ARRANGED.      Babatunde Darnell MD  09/02/22  15:51 EDT

## 2022-09-02 NOTE — NURSING NOTE
Called MD to notify that the pt was unable to take all p.m. medications even  After attempting to crush meds and mix with pudding. OK'd per MD.

## 2022-09-02 NOTE — THERAPY TREATMENT NOTE
Acute Care - Speech Language Pathology   Swallow Treatment Note [facility]     Patient Name: Susan Anderson  : 1940  MRN: [id number]  Today's Date: 2022               Admit Date: 2022    Visit Dx:     ICD-10-CM ICD-9-CM   1. Acute on chronic congestive heart failure, unspecified heart failure type ([facility])  I50.9 428.0   2. Acute on chronic respiratory failure with hypoxia ([facility])  J96.21 518.84     799.02   3. Bradycardia  R00.1 427.89   4. Acute kidney injury ([facility])  N17.9 584.9   5. Anemia, unspecified type  D64.9 285.9   6. Elevated troponin  R77.8 790.6   7. Oral phase dysphagia  R13.11 787.21     Patient Active Problem List   Diagnosis   • Diabetic foot ulcer ([facility])   • PVD (peripheral vascular disease) ([facility])   • Osteomyelitis ([facility])   • Diabetes mellitus with neuropathy ([facility])   • Essential hypertension   • CKD (chronic kidney disease), stage III ([facility])   • Pyogenic inflammation of bone ([facility])   • Hyperglycemia   • Pneumonia due to infectious organism   • Acute hypoxemic respiratory failure ([facility])   • Mitral valve disease   • NICM (nonischemic cardiomyopathy) ([facility])   • Coronary artery disease involving native coronary artery of native heart without angina pectoris   • Dyslipidemia   • Chronic combined systolic and diastolic heart failure ([facility])   • Lumbar stenosis with neurogenic claudication   • Spondylosis of lumbar region without myelopathy or radiculopathy   • Spondylolisthesis, lumbar region   • Degeneration of lumbar or lumbosacral intervertebral disc   • Gait disturbance   • Physical deconditioning   • Sarcopenia   • Weakness   • Anemia, chronic disease   • Fall   • Dizziness   • Acoustic neuroma ([facility])   • Acute on chronic combined systolic and diastolic CHF (congestive heart failure) ([facility])   • LACEY (acute kidney injury) ([facility])   • Effusion of right knee   • Right knee pain   • Atrial fibrillation ([facility])   • Bradycardia   • Pressure injury of skin of sacral region     Past Medical History:    Diagnosis Date   • Arthritis    • Asthma 6/4 - double pneumonia    Currently on inhaler and nebulizer   • Atrial fibrillation (HCC) 8/21/2022   • Cancer (HCC)     cervical cancer, skin cancer   • CHF (congestive heart failure) (HCC) June 4, 2021   • Chronic kidney disease Related to diabetes   • Coronary artery disease 6/4/2021 DX for hear failure   • Diabetes mellitus (HCC) 30 years    Seeing Dr. Lam 1st time Aug 19   • Gout    • Hx of colonoscopy    • Hyperlipidemia Reference current labs x 2-3yrs approx   • Hypertension 30 years   • Migraine    • Mitral valve disease    • Mitral valve disease    • Osteomyelitis (HCC)    • Peripheral neuropathy    • Sleep apnea    • Type 2 diabetes mellitus (HCC)     30 years     Past Surgical History:   Procedure Laterality Date   • ABDOMINAL HYSTERECTOMY W/SALPINGECTOMY     • AMPUTATION  Right great toe, 1st 1/3 metatarsal -Reg 4/14/21   • AORTAGRAM N/A 4/9/2021    Procedure: AORTAGRAM WITH OR WITHOUT RUNOFFS, WITH Co2;  Surgeon: Trey Forrest MD;  Location:  [facility];  Service: Vascular;  Laterality: N/A;   • APPENDECTOMY     • BRAIN TUMOR EXCISION      laser surgery    • CARDIAC CATHETERIZATION N/A 6/21/2021    Procedure: LEFT HEART CATH;  Surgeon: Anjum Ceballos MD;  Location:  [facility] CATH INVASIVE LOCATION;  Service: Cardiology;  Laterality: N/A;   • CATARACT EXTRACTION W/ INTRAOCULAR LENS  IMPLANT, BILATERAL     • CHOLECYSTECTOMY     • EYE SURGERY     • HYSTERECTOMY     • TOE SURGERY     • TRANS METATARSAL AMPUTATION Right 4/14/2021    Procedure: AMPUTATION TRANS METATARSAL RIGHT GREAT TOE;  Surgeon: Valdez Finney MD;  Location:  [facility];  Service: Vascular;  Laterality: Right;       SLP Recommendation and Plan  SLP Swallowing Diagnosis: oral dysphagia (09/02/22 0910)  SLP Diet Recommendation: puree with some mashed, thin liquids (09/02/22 0910)  Recommended Precautions and Strategies: upright posture during/after eating, small bites of food and sips of  liquid, alternate between small bites of food and sips of liquid, general aspiration precautions, reflux precautions, fatigue precautions, assist with feeding (09/02/22 0910)  SLP Rec. for Method of Medication Administration: meds whole, with thin liquids, with pudding or applesauce, as tolerated (09/02/22 0910)     Monitor for Signs of Aspiration: yes, notify SLP if any concerns (09/02/22 0910)  Recommended Diagnostics: other (see comments) (diet tolerance) (09/02/22 0910)  Swallow Criteria for Skilled Therapeutic Interventions Met: demonstrates skilled criteria (09/02/22 0910)  Anticipated Discharge Disposition (SLP): anticipate therapy at next level of care (09/02/22 0910)  Rehab Potential/Prognosis, Swallowing: adequate, monitor progress closely (09/02/22 0910)  Therapy Frequency (Swallow): PRN (09/02/22 0910)  Predicted Duration Therapy Intervention (Days): until discharge (09/02/22 0910)     Daily Summary of Progress (SLP): progress toward functional goals as expected (09/02/22 0910)               Treatment Assessment (SLP): Patient seen this am for diet tolerance. Lethargic but verbally responding and able to accept PO trials. No overt s/s of aspiration or difficulty with pureed trials or thin liquids by straw. Recommend continuation of current level 3, thin liquid diet. SLP to continue to monitor closely. (09/02/22 0910)  Plan for Continued Treatment (SLP): continue treatment per plan of care (09/02/22 0910)                SWALLOW EVALUATION (last 72 hours)     SLP Adult Swallow Evaluation     Row Name 09/02/22 0910 09/01/22 0945 08/30/22 1235             Rehab Evaluation    Document Type therapy note (daily note)  -CH therapy note (daily note)  - therapy note (daily note)  -RD      Subjective Information fatigue  - fatigue  - complains of;pain;fatigue  -RD      Patient Observations lethargic  - lethargic  - alert;cooperative;agree to therapy  w/ encouragement  -RD      Patient/Family/Caregiver  Comments/Observations non present  -CH No family present  - dtr present  -RD      Patient Effort adequate  -CH adequate  -MH adequate  -RD      Symptoms Noted During/After Treatment none  -CH none  -MH --              General Information    Patient Profile Reviewed yes  - -- --              Pain    Additional Documentation Pain Scale: FACES Pre/Post-Treatment (Group)  -CH Pain Scale: FACES Pre/Post-Treatment (Group)  -MH Pain Scale: FACES Pre/Post-Treatment (Group)  -RD              Pain Scale: FACES Pre/Post-Treatment    Pain: FACES Scale, Pretreatment 0-->no hurt  -CH 0-->no hurt  -MH 2-->hurts little bit  -RD      Posttreatment Pain Rating 0-->no hurt  -CH 0-->no hurt  -MH 2-->hurts little bit  -RD              SLP Evaluation Clinical Impression    SLP Swallowing Diagnosis oral dysphagia  - -- oral dysphagia  -RD      Functional Impact risk of aspiration/pneumonia  -CH -- risk of aspiration/pneumonia  -RD      Rehab Potential/Prognosis, Swallowing adequate, monitor progress closely  - -- adequate, monitor progress closely  -RD      Swallow Criteria for Skilled Therapeutic Interventions Met demonstrates skilled criteria  - -- demonstrates skilled criteria  -RD              SLP Treatment Clinical Impressions    Treatment Assessment (SLP) Patient seen this am for diet tolerance. Lethargic but verbally responding and able to accept PO trials. No overt s/s of aspiration or difficulty with pureed trials or thin liquids by straw. Recommend continuation of current level 3, thin liquid diet. SLP to continue to monitor closely.  -CH Pt seen for diet tolerance. Pt extremely lethargic but able to accept trials. No overt s/s of aspiration w/ thin liquid or pureed trials. SLP will continue to monitor closely and repeat instrumental assessment if needed. Rec: cont pureed and thin liquid diet, feed only if fully awake/ alert. SLP will f/u to ensure no further concerns  - Dysphagia treatment completed. No overt s/s of  aspiration w/ current diet. Observed w/ lunch tray. Although poor appetite & burping frequently. Educated on dysphagia and precautions. Pt participated well w/ dysphagia exercises w/ encouragement. Continue to address dysphagia during treatment  -RD      Daily Summary of Progress (SLP) progress toward functional goals as expected  - progress toward functional goals as expected  - progress towards functional goals is fair  -RD      Barriers to Overall Progress (SLP) Lethargy  - Fatigue  - Fatigue  -RD      Plan for Continued Treatment (SLP) continue treatment per plan of care  - continue treatment per plan of care  - continue treatment per plan of care  -RD      Care Plan Review evaluation/treatment results reviewed  - evaluation/treatment results reviewed  - evaluation/treatment results reviewed;care plan/treatment goals reviewed;risks/benefits reviewed;current/potential barriers reviewed;patient/other agree to care plan  -RD      Care Plan Review, Other Participant(s) -- -- daughter  -RD              Recommendations    Therapy Frequency (Swallow) PRN  - PRN  - PRN  -RD      Predicted Duration Therapy Intervention (Days) until discharge  - until discharge  - until discharge  -RD      SLP Diet Recommendation puree with some mashed;thin liquids  - puree with some mashed;thin liquids  - puree with some mashed;thin liquids  -RD      Recommended Diagnostics other (see comments)  diet tolerance  - other (see comments)  diet tolerance  - --      Recommended Precautions and Strategies upright posture during/after eating;small bites of food and sips of liquid;alternate between small bites of food and sips of liquid;general aspiration precautions;reflux precautions;fatigue precautions;assist with feeding  - upright posture during/after eating;small bites of food and sips of liquid;alternate between small bites of food and sips of liquid;general aspiration precautions;reflux  precautions;fatigue precautions;assist with feeding  - upright posture during/after eating;small bites of food and sips of liquid;alternate between small bites of food and sips of liquid;general aspiration precautions;reflux precautions;fatigue precautions;assist with feeding  -RD      Oral Care Recommendations Oral Care BID/PRN  -CH Oral Care BID/PRN  -MH Oral Care BID/PRN  -RD      SLP Rec. for Method of Medication Administration meds whole;with thin liquids;with pudding or applesauce;as tolerated  - meds whole;with thin liquids;with pudding or applesauce;as tolerated  - meds whole;with thin liquids;with pudding or applesauce;as tolerated  -RD      Monitor for Signs of Aspiration yes;notify SLP if any concerns  - yes;notify SLP if any concerns  - yes;notify SLP if any concerns  -RD      Anticipated Discharge Disposition (SLP) anticipate therapy at next level of care  - anticipate therapy at next level of care  - anticipate therapy at next level of care  -RD            User Key  (r) = Recorded By, (t) = Taken By, (c) = Cosigned By    Initials Name Effective Dates    RD Vi Reyes MS CCC-SLP 06/16/21 -     Lyn Dillard MS CCC-SLP 06/16/21 -     Leonarda Brink MS, DEANDRE-SLP 06/22/22 -                 EDUCATION  The patient has been educated in the following areas:   Home Exercise Program (HEP) Dysphagia (Swallowing Impairment) Oral Care/Hydration.        SLP GOALS     Row Name 09/02/22 0910 08/30/22 1235          (LTG) Patient will demonstrate functional swallow for    Diet Texture (Demonstrate functional swallow) soft whole textures  -CH soft whole textures  -RD     Liquid viscosity (Demonstrate functional swallow) thin liquids  -CH thin liquids  -RD     [place] (Demonstrate functional swallow) with minimal cues (75-90% accuracy)  - with minimal cues (75-90% accuracy)  -RD     Time Frame (Demonstrate functional swallow) by discharge  -CH by discharge  -RD      Progress/Outcomes (Demonstrate functional swallow) goal ongoing  -CH continuing progress toward goal  -RD     Comment (Demonstrate functional swallow) soft solid trials declined by patient  -CH pt refused to try whole solid trials  -RD            (STG) Patient will tolerate trials of    Consistencies Trialed (Tolerate trials) pureed/ mashed textures;thin liquids  -CH pureed/ mashed textures;thin liquids  -RD     Desired Outcome (Tolerate trials) with adequate oral prep/transit/clearance;without signs/symptoms of aspiration  -CH with adequate oral prep/transit/clearance;without signs/symptoms of aspiration  -RD     [place] (Tolerate trials) with 1:1 assist/ supervision  -CH with 1:1 assist/ supervision  -RD     Time Frame (Tolerate trials) by discharge  -CH by discharge  -RD     Progress/Outcomes (Tolerate trials) continuing progress toward goal  -CH continuing progress toward goal  -RD     Comment (Tolerate trials) No overt s/s of aspiration or difficulty with pureed, some mashed and thin liquids via straw.  -CH poor appetite, but accepted some PO trials of pureed some mashed items and thin liquids. No overt s/s of aspiration w/ trials given or w/ lunch tray. Pt refused soft whole solids given. Burping at end of trials which is baseline for patient. Education provided on fatigue precautions  -RD            (STG) Patient will tolerate therapeutic trials of    Consistencies Trialed (Tolerate therapeutic trials) soft whole textures  -CH soft whole textures  -RD     Desired Outcome (Tolerate therapeutic trials) with adequate oral prep/transit/clearance;without signs/symptoms of aspiration  -CH with adequate oral prep/transit/clearance;without signs/symptoms of aspiration  -RD     [place] (Tolerate therapeutic trials) with minimal cues (75-90% accuracy)  -CH with minimal cues (75-90% accuracy)  -RD     Time Frame (Tolerate therapeutic trials) by discharge  -CH by discharge  -RD     Progress/Outcomes (Tolerate  therapeutic trials) goal ongoing  -CH goal ongoing  -RD     Comment (Tolerate therapeutic trials) Patient declined further trials  -CH pt refused trials  -RD            (STG) Lingual Strengthening Goal 1 (SLP)    Activity (Lingual Strengthening Goal 1, SLP) increase lingual tone/sensation/control/coordination/movement;increase tongue back strength  -CH increase lingual tone/sensation/control/coordination/movement;increase tongue back strength  -RD     Increase Lingual Tone/Sensation/Control/Coordination/Movement lingual resistance exercises  -CH lingual resistance exercises  -RD     Increase Tongue Back Strength lingual resistance exercises  -CH lingual resistance exercises  -RD     [place]/Accuracy (Lingual Strengthening Goal 1, SLP) with minimal cues (75-90% accuracy)  -CH with minimal cues (75-90% accuracy)  -RD     Time Frame (Lingual Strengthening Goal 1, SLP) short term goal (STG)  -CH short term goal (STG)  -RD     Progress/Outcomes (Lingual Strengthening Goal 1, SLP) continuing progress toward goal  -CH continuing progress toward goal  -RD     Comment (Lingual Strengthening Goal 1, SLP) completed x 3 with max cues, lethargy impacting.  -CH x5 reps for 3 exercises  -RD           User Key  (r) = Recorded By, (t) = Taken By, (c) = Cosigned By    Initials Name Provider Type    Vi Caruso MS CCC-SLP Speech and Language Pathologist    Lyn Dillard MS CCC-SLP Speech and Language Pathologist                   Time Calculation:    Time Calculation- SLP     Row Name 09/02/22 0939             Time Calculation- SLP    SLP Start Time 0910  -      SLP Received On 09/02/22  -              Untimed Charges    [id number] Treatment Swallow Minutes 30  -CH              Total Minutes    Untimed Charges Total Minutes 30  -CH       Total Minutes 30  -CH            User Key  (r) = Recorded By, (t) = Taken By, (c) = Cosigned By    Initials Name Provider Type    Lyn Dillard, MS CCC-SLP Speech and  Language Pathologist                Therapy Charges for Today     Code Description Service Date Service Provider Modifiers Qty    [id number] HC ST TREATMENT SWALLOW 2 9/2/2022 Lyn Beltrán MS CCC-SLP GN 1               Lyn Beltrán MS CCC-SLP  9/2/2022       Patient was not wearing a face mask and did not exhibit coughing during this therapy encounter.  Procedure performed was aerosolizing, involved close contact (within 6 feet for at least 15 minutes or longer), and did not involve contact with infectious secretions or specimens.  Therapist used appropriate personal protective equipment including gloves, standard procedure mask and eye protection.  Appropriate PPE was worn during the entire therapy session.  Hand hygiene was completed before and after therapy session.

## 2022-09-02 NOTE — THERAPY TREATMENT NOTE
Patient Name: Susan Anderson  : 1940    MRN: [id number]                              Today's Date: 2022       Admit Date: 2022    Visit Dx:     ICD-10-CM ICD-9-CM   1. Acute on chronic congestive heart failure, unspecified heart failure type ([facility])  I50.9 428.0   2. Acute on chronic respiratory failure with hypoxia ([facility])  J96.21 518.84     799.02   3. Bradycardia  R00.1 427.89   4. Acute kidney injury ([facility])  N17.9 584.9   5. Anemia, unspecified type  D64.9 285.9   6. Elevated troponin  R77.8 790.6   7. Oral phase dysphagia  R13.11 787.21     Patient Active Problem List   Diagnosis   • Diabetic foot ulcer ([facility])   • PVD (peripheral vascular disease) ([facility])   • Osteomyelitis ([facility])   • Diabetes mellitus with neuropathy ([facility])   • Essential hypertension   • CKD (chronic kidney disease), stage III ([facility])   • Pyogenic inflammation of bone ([facility])   • Hyperglycemia   • Pneumonia due to infectious organism   • Acute hypoxemic respiratory failure ([facility])   • Mitral valve disease   • NICM (nonischemic cardiomyopathy) ([facility])   • Coronary artery disease involving native coronary artery of native heart without angina pectoris   • Dyslipidemia   • Chronic combined systolic and diastolic heart failure ([facility])   • Lumbar stenosis with neurogenic claudication   • Spondylosis of lumbar region without myelopathy or radiculopathy   • Spondylolisthesis, lumbar region   • Degeneration of lumbar or lumbosacral intervertebral disc   • Gait disturbance   • Physical deconditioning   • Sarcopenia   • Weakness   • Anemia, chronic disease   • Fall   • Dizziness   • Acoustic neuroma ([facility])   • Acute on chronic combined systolic and diastolic CHF (congestive heart failure) ([facility])   • LACEY (acute kidney injury) ([facility])   • Effusion of right knee   • Right knee pain   • Atrial fibrillation ([facility])   • Bradycardia   • Pressure injury of skin of sacral region     Past Medical History:   Diagnosis Date   • Arthritis    • Asthma  - double pneumonia     Currently on inhaler and nebulizer   • Atrial fibrillation (HCC) 8/21/2022   • Cancer (HCC)     cervical cancer, skin cancer   • CHF (congestive heart failure) (HCC) June 4, 2021   • Chronic kidney disease Related to diabetes   • Coronary artery disease 6/4/2021 DX for hear failure   • Diabetes mellitus (HCC) 30 years    Seeing Dr. Lam 1st time Aug 19   • Gout    • Hx of colonoscopy    • Hyperlipidemia Reference current labs x 2-3yrs approx   • Hypertension 30 years   • Migraine    • Mitral valve disease    • Mitral valve disease    • Osteomyelitis (HCC)    • Peripheral neuropathy    • Sleep apnea    • Type 2 diabetes mellitus (HCC)     30 years     Past Surgical History:   Procedure Laterality Date   • ABDOMINAL HYSTERECTOMY W/SALPINGECTOMY     • AMPUTATION  Right great toe, 1st 1/3 metatarsal -Reg 4/14/21   • AORTAGRAM N/A 4/9/2021    Procedure: AORTAGRAM WITH OR WITHOUT RUNOFFS, WITH Co2;  Surgeon: Trey Forrest MD;  Location: [facility];  Service: Vascular;  Laterality: N/A;   • APPENDECTOMY     • BRAIN TUMOR EXCISION      laser surgery    • CARDIAC CATHETERIZATION N/A 6/21/2021    Procedure: LEFT HEART CATH;  Surgeon: Anjum Ceballos MD;  Location:  [facility];  Service: Cardiology;  Laterality: N/A;   • CATARACT EXTRACTION W/ INTRAOCULAR LENS  IMPLANT, BILATERAL     • CHOLECYSTECTOMY     • EYE SURGERY     • HYSTERECTOMY     • TOE SURGERY     • TRANS METATARSAL AMPUTATION Right 4/14/2021    Procedure: AMPUTATION TRANS METATARSAL RIGHT GREAT TOE;  Surgeon: Valdez Finney MD;  Location: [facility] OR;  Service: Vascular;  Laterality: Right;      General Information     Row Name 09/02/22 0925          Physical Therapy Time and Intention    Document Type progress note/recertification  -ML     Mode of Treatment physical therapy  -ML     Row Name 09/02/22 0925          General Information    Patient Profile Reviewed yes  -ML     Existing Precautions/Restrictions oxygen therapy device and  L/min;fall;other (see comments)  AMS, delicate skin integrity  -ML     Barriers to Rehab medically complex;cognitive status  Pt with minimal purposeful verbalizations  -     Row Name 09/02/22 0925          Cognition    Orientation Status (Cognition) oriented to;person;unable/difficult to assess  -     Row Name 09/02/22 0925          Safety Issues, Functional Mobility    Safety Issues Affecting Function (Mobility) awareness of need for assistance;friction/shear risk;insight into deficits/self-awareness;judgment;problem-solving;safety precaution awareness;safety precautions follow-through/compliance;sequencing abilities  -ML     Impairments Affecting Function (Mobility) balance;endurance/activity tolerance;coordination;pain;postural/trunk control;shortness of breath;strength  -ML     Comment, Safety Issues/Impairments (Mobility) Patient moaning throughout treatment session, no purposeful conversation  -ML           User Key  (r) = Recorded By, (t) = Taken By, (c) = Cosigned By    Initials Name Provider Type     Janeth Cervantes Physical Therapist               Mobility     Row Name 09/02/22 0928          Bed Mobility    Bed Mobility sit-supine;rolling left;scooting/bridging;rolling right  -ML     Rolling Left [place] (Bed Mobility) dependent (less than 25% patient effort);2 person assist;verbal cues  -ML     Rolling Right [place] (Bed Mobility) dependent (less than 25% patient effort);2 person assist;verbal cues  -ML     Scooting/Bridging [place] (Bed Mobility) dependent (less than 25% patient effort);2 person assist;verbal cues  -ML     Sit-Supine [place] (Bed Mobility) dependent (less than 25% patient effort);2 person assist;verbal cues;nonverbal cues (demo/gesture)  -ML     Assistive Device (Bed Mobility) bed rails;draw sheet;head of bed elevated  -ML     Comment, (Bed Mobility) Patient found seated at EOB with OT. Patient required verbal and tactile cues throughout for all bed mobility.  -ML      Row Name 09/02/22 0928          Bed-Chair Transfer    Bed-Chair Mono (Transfers) unable to assess  -ML     Row Name 09/02/22 0928          Sit-Stand Transfer    Sit-Stand Mono (Transfers) maximum assist (25% patient effort);other (see comments)  x3 person assist, blocking feet and knees, cues for hip extension  -ML     Assistive Device (Sit-Stand Transfers) other (see comments)  UE support  -ML     Row Name 09/02/22 0928          Gait/Stairs (Locomotion)    Mono Level (Gait) unable to assess  -ML           User Key  (r) = Recorded By, (t) = Taken By, (c) = Cosigned By    Initials Name Provider Type    ML Janeth Cervantes Physical Therapist               Obj/Interventions     Row Name 09/02/22 0932          Range of Motion Comprehensive    General Range of Motion bilateral lower extremity ROM WFL  -ML     Row Name 09/02/22 0932          Strength Comprehensive (MMT)    General Manual Muscle Testing (MMT) Assessment lower extremity strength deficits identified  -ML     Comment, General Manual Muscle Testing (MMT) Assessment knee flexion/extension 3/5, hip flexion 2-/5  -ML     Row Name 09/02/22 0932          Motor Skills    Therapeutic Exercise knee  -ML     Row Name 09/02/22 0932          Knee (Therapeutic Exercise)    Knee (Therapeutic Exercise) strengthening exercise  -ML     Knee Strengthening (Therapeutic Exercise) bilateral;LAQ (long arc quad);5 repetitions  -ML     Row Name 09/02/22 0932          Balance    Balance Assessment sitting static balance;sitting dynamic balance;sit to stand dynamic balance;standing static balance  -ML     Static Sitting Balance maximum assist;1-person assist;verbal cues;non-verbal cues (demo/gesture)  -ML     Dynamic Sitting Balance maximum assist;2-person assist;verbal cues;non-verbal cues (demo/gesture)  -ML     Position, Sitting Balance unsupported;sitting edge of bed  -ML     Sit to Stand Dynamic Balance maximum assist;other (see comments)  x3 person assist   -ML     Static Standing Balance maximum assist;2-person assist;verbal cues;non-verbal cues (demo/gesture)  -ML     Dynamic Standing Balance not tested  -ML     Position/Device Used, Standing Balance supported;other (see comments)  BUE support  -ML     Balance Interventions sitting;standing;sit to stand;supported;static;dynamic  -ML     Comment, Balance Patient tolerated sitting at EOB with maxA, cued to shift weight forward. Patient required maxA for static standing balance. Patient able to tolerate static standing for approx 30 seconds.  -ML           User Key  (r) = Recorded By, (t) = Taken By, (c) = Cosigned By    Initials Name Provider Type    ML Janeth Cervantes Physical Therapist               Goals/Plan     Row Name 09/02/22 0942          Bed Mobility Goal 1 (PT)    Activity/Assistive Device (Bed Mobility Goal 1, PT) sit to supine/supine to sit  -ML     [place] Level/Cues Needed (Bed Mobility Goal 1, PT) minimum assist (75% or more patient effort)  -ML     Time Frame (Bed Mobility Goal 1, PT) long term goal (LTG);10 days  -ML     Progress/Outcomes (Bed Mobility Goal 1, PT) goal ongoing;progress slower than expected  -ML     Row Name 09/02/22 0942          Transfer Goal 1 (PT)    Activity/Assistive Device (Transfer Goal 1, PT) sit-to-stand/stand-to-sit;bed-to-chair/chair-to-bed  -ML     [place] Level/Cues Needed (Transfer Goal 1, PT) contact guard required  -ML     Time Frame (Transfer Goal 1, PT) long term goal (LTG);10 days  -ML     Progress/Outcome (Transfer Goal 1, PT) goal ongoing;progress slower than expected  -ML     Row Name 09/02/22 0942          Gait Training Goal 1 (PT)    Activity/Assistive Device (Gait Training Goal 1, PT) gait (walking locomotion);assistive device use  -ML     [place] Level (Gait Training Goal 1, PT) minimum assist (75% or more patient effort)  -ML     Distance (Gait Training Goal 1, PT) 15ft  -ML     Time Frame (Gait Training Goal 1, PT) long term goal (LTG);10 days   -     Progress/Outcome (Gait Training Goal 1, PT) goal ongoing;progress slower than expected  -[facility] Name 09/02/22 0942          Therapy Assessment/Plan (PT)    Planned Therapy Interventions (PT) balance training;bed mobility training;gait training;home exercise program;neuromuscular re-education;patient/family education;postural re-education;ROM (range of motion);strengthening;transfer training  -           User Key  (r) = Recorded By, (t) = Taken By, (c) = Cosigned By    Initials Name Provider Type     Janeth Cervantes Physical Therapist               Clinical Impression     [facility] Name 09/02/22 0935          Pain Scale: FACES Pre/Post-Treatment    Pain: FACES Scale, Pretreatment 6-->hurts even more  -ML     Posttreatment Pain Rating 6-->hurts even more  -ML     Pain Location generalized  -     Pre/Posttreatment Pain Comment Patient moaning throughout treatment session, does not verbalize location of pain/discomfort  -[facility] Name 09/02/22 0935          Plan of Care Review    Plan of Care Reviewed With patient  -     Progress no change  -     Outcome Evaluation Physical therapy re-certification complete. The patient required depAx2 to complete all bed mobility. Patient required maxA for sitting balance at EOB. Patient completed 1 sit to stand transfer with maxAx3, tolerated static standing for approx 30 seconds.Patient would continue to benefit from skilled PT to progress activity tolerance, strength and balance deficits. Continue to recommend SNF at hospital discharge.  -[facility] Name 09/02/22 0935          Therapy Assessment/Plan (PT)    Rehab Potential (PT) fair, will monitor progress closely  -     Criteria for Skilled Interventions Met (PT) yes;meets criteria;skilled treatment is necessary  -     Therapy Frequency (PT) 3 times/wk  -[facility] Name 09/02/22 0935          Vital Signs    O2 Delivery Pre Treatment supplemental O2  -ML     Post SpO2 (%) 92  -ML     O2 Delivery Post Treatment  supplemental O2  -ML     Pre Patient Position Supine  -ML     Intra Patient Position Standing  -ML     Post Patient Position Supine  -ML     Row Name 09/02/22 0935          Positioning and Restraints    Pre-Treatment Position in bed  -ML     Post Treatment Position bed  -ML     In Bed notified nsg;fowlers;call light within reach;encouraged to call for assist;exit alarm on;with other staff;heels elevated  -ML           User Key  (r) = Recorded By, (t) = Taken By, (c) = Cosigned By    Initials Name Provider Type    Janeth Dumont Physical Therapist               Outcome Measures     Row Name 09/02/22 0943          How much help from another person do you currently need...    Turning from your back to your side while in flat bed without using bedrails? 1  -ML     Moving from lying on back to sitting on the side of a flat bed without bedrails? 1  -ML     Moving to and from a bed to a chair (including a wheelchair)? 1  -ML     Standing up from a chair using your arms (e.g., wheelchair, bedside chair)? 1  -ML     Climbing 3-5 steps with a railing? 1  -ML     To walk in hospital room? 1  -ML     AM-PAC 6 Clicks Score (PT) 6  -ML     Highest level of mobility 2 --> Bed activities/dependent transfer  -ML     Row Name 09/02/22 0943 09/02/22 0845       Functional Assessment    Outcome Measure Options AM-PAC 6 Clicks Basic Mobility (PT)  -ML AM-PAC 6 Clicks Daily Activity (OT)  -KF          User Key  (r) = Recorded By, (t) = Taken By, (c) = Cosigned By    Initials Name Provider Type    Edwina Recinos, OT Occupational Therapist    Janeth Dumont Physical Therapist                             Physical Therapy Education                 Title: PT OT SLP Therapies (In Progress)     Topic: Physical Therapy (In Progress)     Point: Mobility training (In Progress)     Learning Progress Summary           Patient Acceptance, E, NR by ML at 9/2/2022 0944    Acceptance, E, NR by AE at 8/26/2022 1430    Acceptance, E, NR by AS at  8/24/2022 1427    Acceptance, E, VU by AE at 8/22/2022 1100   Family Acceptance, E, VU by AE at 8/22/2022 1100                   Point: Home exercise program (In Progress)     Learning Progress Summary           Patient Acceptance, E, NR by ML at 9/2/2022 0944    Acceptance, E, NR by ML at 8/29/2022 1603    Acceptance, E, NR by AE at 8/26/2022 1430    Acceptance, E, NR by AS at 8/24/2022 1427                   Point: Body mechanics (In Progress)     Learning Progress Summary           Patient Acceptance, E, NR by ML at 9/2/2022 0944    Acceptance, E, NR by AE at 8/26/2022 1430    Acceptance, E, NR by AS at 8/24/2022 1427    Acceptance, E, VU by AE at 8/22/2022 1100   Family Acceptance, E, VU by AE at 8/22/2022 1100                   Point: Precautions (In Progress)     Learning Progress Summary           Patient Acceptance, E, NR by ML at 9/2/2022 0944    Acceptance, E, NR by ML at 8/29/2022 1603    Acceptance, E, NR by AE at 8/26/2022 1430    Acceptance, E, NR by AS at 8/24/2022 1427    Acceptance, E, VU by AE at 8/22/2022 1100   Family Acceptance, E, VU by AE at 8/22/2022 1100                               User Key     Initials Effective Dates Name Provider Type Discipline    AS 06/16/21 -  Desiree Zamudio, PTA Physical Therapist Assistant PT    ML 04/22/21 -  Janeth Cervantes Physical Therapist PT    AE 09/21/21 -  Wellington Brito, PT Physical Therapist PT              PT Recommendation and Plan  Planned Therapy Interventions (PT): balance training, bed mobility training, gait training, home exercise program, neuromuscular re-education, patient/family education, postural re-education, ROM (range of motion), strengthening, transfer training  Plan of Care Reviewed With: patient  Progress: no change  Outcome Evaluation: Physical therapy re-certification complete. The patient required depAx2 to complete all bed mobility. Patient required maxA for sitting balance at EOB. Patient completed 1 sit to stand transfer with  maxAx3, tolerated static standing for approx 30 seconds.Patient would continue to benefit from skilled PT to progress activity tolerance, strength and balance deficits. Continue to recommend SNF at hospital discharge.     Time Calculation:    PT Charges     Row Name 09/02/22 0944             Time Calculation    Start Time 0814  -ML      Stop Time 0828  -ML      Time Calculation (min) 14 min  -ML      PT Received On 09/02/22  -ML      PT Goal Re-Cert Due Date 09/12/22  -ML              Timed Charges    13447 - PT Therapeutic Activity Minutes 14  -ML              Total Minutes    Timed Charges Total Minutes 14  -ML       Total Minutes 14  -ML            User Key  (r) = Recorded By, (t) = Taken By, (c) = Cosigned By    Initials Name Provider Type    Janeth Dumont Physical Therapist              Therapy Charges for Today     Code Description Service Date Service Provider Modifiers Qty    85932289672 HC PT THER SUPP EA 15 MIN 9/2/2022 Janeth Cervantes GP 1    86753729046 HC PT THER PROC EA 15 MIN 9/2/2022 Janeth Cervantes GP 1          PT G-Codes  Outcome Measure Options: AM-PAC 6 Clicks Basic Mobility (PT)  AM-PAC 6 Clicks Score (PT): 6  AM-PAC 6 Clicks Score (OT): 9    Janeth iBlly  9/2/2022

## 2022-09-02 NOTE — PLAN OF CARE
Problem: Adult Inpatient Plan of Care  Goal: Plan of Care Review  Outcome: Ongoing, Progressing  Flowsheets (Taken 9/2/2022 0526)  Progress: no change  Plan of Care Reviewed With: patient  Goal: Patient-Specific Goal (Individualized)  Outcome: Ongoing, Progressing  Goal: Absence of Hospital-Acquired Illness or Injury  Outcome: Ongoing, Progressing  Intervention: Identify and Manage Fall Risk  Recent Flowsheet Documentation  Taken 9/1/2022 2000 by Odette Munson RN  Safety Promotion/Fall Prevention:   fall prevention program maintained   safety round/check completed  Intervention: Prevent Skin Injury  Recent Flowsheet Documentation  Taken 9/1/2022 2000 by Odette Munson RN  Body Position:   lower extremity elevated   neutral body alignment  Intervention: Prevent and Manage VTE (Venous Thromboembolism) Risk  Recent Flowsheet Documentation  Taken 9/1/2022 2000 by Odette Munson RN  Activity Management: activity adjusted per tolerance  Intervention: Prevent Infection  Recent Flowsheet Documentation  Taken 9/1/2022 2000 by Odette Munson RN  Infection Prevention: environmental surveillance performed  Goal: Optimal Comfort and Wellbeing  Outcome: Ongoing, Progressing  Goal: Readiness for Transition of Care  Outcome: Ongoing, Progressing     Problem: Fall Injury Risk  Goal: Absence of Fall and Fall-Related Injury  Outcome: Ongoing, Progressing  Intervention: Identify and Manage Contributors  Recent Flowsheet Documentation  Taken 9/1/2022 2000 by Odette Munson RN  Medication Review/Management: medications reviewed  Intervention: Promote Injury-Free Environment  Recent Flowsheet Documentation  Taken 9/1/2022 2000 by Odette Munson, RN  Safety Promotion/Fall Prevention:   fall prevention program maintained   safety round/check completed     Problem: Heart Failure Comorbidity  Goal: Maintenance of Heart Failure Symptom Control  Outcome: Ongoing, Progressing  Intervention: Maintain Heart Failure-Management  Recent Flowsheet  Documentation  Taken 9/1/2022 2000 by Odette Munson, RN  Medication Review/Management: medications reviewed     Problem: Pain Chronic (Persistent) (Comorbidity Management)  Goal: Acceptable Pain Control and Functional Ability  Outcome: Ongoing, Progressing  Intervention: Manage Persistent Pain  Recent Flowsheet Documentation  Taken 9/1/2022 2000 by Odette Munson RN  Medication Review/Management: medications reviewed  Intervention: Optimize Psychosocial Wellbeing  Recent Flowsheet Documentation  Taken 9/1/2022 2000 by Odette Munson RN  Supportive Measures: active listening utilized  Diversional Activities: television     Problem: Skin Injury Risk Increased  Goal: Skin Health and Integrity  Outcome: Ongoing, Progressing  Intervention: Optimize Skin Protection  Recent Flowsheet Documentation  Taken 9/1/2022 2000 by Odette Munson, RN  Head of Bed (HOB) Positioning: HOB elevated   Goal Outcome Evaluation:  Plan of Care Reviewed With: patient        Progress: no change

## 2022-09-02 NOTE — PLAN OF CARE
Goal Outcome Evaluation:    SLP treatment completed. Will continue to address oral phase dysphagia/diet tolerance. Please see note for further details and recommendations.

## 2022-09-02 NOTE — PLAN OF CARE
Goal Outcome Evaluation:  Plan of Care Reviewed With: patient        Progress: no change  Outcome Evaluation: Physical therapy re-certification complete. The patient required depAx2 to complete all bed mobility. Patient required maxA for sitting balance at EOB. Patient completed 1 sit to stand transfer with maxAx3, tolerated static standing for approx 30 seconds.Patient would continue to benefit from skilled PT to progress activity tolerance, strength and balance deficits. Continue to recommend SNF at hospital discharge.

## 2022-09-02 NOTE — CASE MANAGEMENT/SOCIAL WORK
Continued Stay Note  [facility]     Patient Name: Susan Anderson  MRN: [id number]  Today's Date: 9/2/2022    Admit Date: 8/21/2022     Discharge Plan     Row Name 09/02/22 1139       Plan    Plan update    Patient/Family in Agreement with Plan yes    Plan Comments Spoke with liaison for [facility] who advises insurance has not provided answer to precert but anticipate possible approval over the weekend and advise setting a tentative ambulance.  Request sent to transportation scheduling who has made arrangements for  Ambulance to transport patient on Saturday 9/3 at 1130.  Spoke with patient daughter, Katie, via telephone regarding discharge plan and advised that precert is still pending but an ambulance has been scheduled in anticipation of receiving approval for 9/3 at 1130.  She verbalizes understanding and agreement with plan.  Just concerned about her getting therapy.  No other discharge needs/concerns verbalized..  CM following.  Patient plan is to discharge to [facility] 9/3 via  Ambulance scheduled for 1130.    Final Discharge Disposition Code 03 - skilled nursing facility (SNF)               Discharge Codes    No documentation.               Expected Discharge Date and Time     Expected Discharge Date Expected Discharge Time    Sep 3, 2022             Larissa Freeman, DEANN

## 2022-09-03 VITALS
WEIGHT: 218.7 LBS | RESPIRATION RATE: 18 BRPM | BODY MASS INDEX: 37.34 KG/M2 | TEMPERATURE: 98.2 F | DIASTOLIC BLOOD PRESSURE: 81 MMHG | HEIGHT: 64 IN | OXYGEN SATURATION: 98 % | HEART RATE: 90 BPM | SYSTOLIC BLOOD PRESSURE: 149 MMHG

## 2022-09-03 LAB — GLUCOSE BLDC GLUCOMTR-MCNC: 244 MG/DL (ref 70–130)

## 2022-09-03 PROCEDURE — 63710000001 INSULIN LISPRO (HUMAN) PER 5 UNITS: Performed by: INTERNAL MEDICINE

## 2022-09-03 PROCEDURE — 63710000001 ONDANSETRON PER 8 MG: Performed by: PHYSICIAN ASSISTANT

## 2022-09-03 PROCEDURE — 99239 HOSP IP/OBS DSCHRG MGMT >30: CPT | Performed by: NURSE PRACTITIONER

## 2022-09-03 PROCEDURE — 82962 GLUCOSE BLOOD TEST: CPT

## 2022-09-03 PROCEDURE — 63710000001 INSULIN DETEMIR PER 5 UNITS: Performed by: INTERNAL MEDICINE

## 2022-09-03 RX ORDER — DIAZEPAM 5 MG/1
5 TABLET ORAL ONCE
Status: COMPLETED | OUTPATIENT
Start: 2022-09-03 | End: 2022-09-03

## 2022-09-03 RX ORDER — POTASSIUM CHLORIDE 750 MG/1
40 CAPSULE, EXTENDED RELEASE ORAL DAILY
Qty: 120 CAPSULE | Refills: 0 | Status: SHIPPED | OUTPATIENT
Start: 2022-09-04 | End: 2022-09-30 | Stop reason: HOSPADM

## 2022-09-03 RX ORDER — BUMETANIDE 2 MG/1
2 TABLET ORAL DAILY
Qty: 30 TABLET | Refills: 0 | Status: SHIPPED | OUTPATIENT
Start: 2022-09-04 | End: 2022-09-30 | Stop reason: HOSPADM

## 2022-09-03 RX ORDER — DIAZEPAM 5 MG/1
5 TABLET ORAL EVERY 8 HOURS PRN
Qty: 24 TABLET | Refills: 0 | Status: SHIPPED | OUTPATIENT
Start: 2022-09-03 | End: 2022-09-06

## 2022-09-03 RX ORDER — AMOXICILLIN AND CLAVULANATE POTASSIUM 875; 125 MG/1; MG/1
1 TABLET, FILM COATED ORAL EVERY 12 HOURS SCHEDULED
Qty: 60 TABLET | Refills: 2 | Status: SHIPPED | OUTPATIENT
Start: 2022-09-03 | End: 2022-09-30 | Stop reason: HOSPADM

## 2022-09-03 RX ORDER — NYSTATIN 100000 [USP'U]/G
POWDER TOPICAL EVERY 12 HOURS SCHEDULED
Qty: 30 G | Refills: 0 | Status: SHIPPED | OUTPATIENT
Start: 2022-09-03 | End: 2022-09-30 | Stop reason: HOSPADM

## 2022-09-03 RX ORDER — SPIRONOLACTONE 50 MG/1
50 TABLET, FILM COATED ORAL DAILY
Qty: 30 TABLET | Refills: 0 | Status: SHIPPED | OUTPATIENT
Start: 2022-09-04 | End: 2022-09-30 | Stop reason: HOSPADM

## 2022-09-03 RX ADMIN — ISOSORBIDE DINITRATE 20 MG: 20 TABLET ORAL at 08:36

## 2022-09-03 RX ADMIN — SENNOSIDES AND DOCUSATE SODIUM 2 TABLET: 50; 8.6 TABLET ORAL at 08:36

## 2022-09-03 RX ADMIN — NYSTATIN: 100000 POWDER TOPICAL at 08:38

## 2022-09-03 RX ADMIN — ONDANSETRON HYDROCHLORIDE 4 MG: 4 TABLET, FILM COATED ORAL at 10:18

## 2022-09-03 RX ADMIN — ATORVASTATIN CALCIUM 40 MG: 40 TABLET, FILM COATED ORAL at 08:36

## 2022-09-03 RX ADMIN — AMOXICILLIN AND CLAVULANATE POTASSIUM 1 TABLET: 875; 125 TABLET, FILM COATED ORAL at 08:36

## 2022-09-03 RX ADMIN — GUAIFENESIN 1200 MG: 600 TABLET, EXTENDED RELEASE ORAL at 08:37

## 2022-09-03 RX ADMIN — HYDRALAZINE HYDROCHLORIDE 25 MG: 25 TABLET ORAL at 06:39

## 2022-09-03 RX ADMIN — ASPIRIN 81 MG: 81 TABLET, COATED ORAL at 08:37

## 2022-09-03 RX ADMIN — APIXABAN 2.5 MG: 2.5 TABLET, FILM COATED ORAL at 08:36

## 2022-09-03 RX ADMIN — INSULIN DETEMIR 15 UNITS: 100 INJECTION, SOLUTION SUBCUTANEOUS at 08:39

## 2022-09-03 RX ADMIN — BUMETANIDE 2 MG: 2 TABLET ORAL at 08:36

## 2022-09-03 RX ADMIN — Medication 400 MG: at 08:36

## 2022-09-03 RX ADMIN — POTASSIUM CHLORIDE 40 MEQ: 750 CAPSULE, EXTENDED RELEASE ORAL at 08:36

## 2022-09-03 RX ADMIN — SPIRONOLACTONE 50 MG: 25 TABLET ORAL at 08:37

## 2022-09-03 RX ADMIN — FERROUS SULFATE TAB 325 MG (65 MG ELEMENTAL FE) 325 MG: 325 (65 FE) TAB at 08:36

## 2022-09-03 RX ADMIN — DIAZEPAM 5 MG: 5 TABLET ORAL at 11:28

## 2022-09-03 RX ADMIN — INSULIN LISPRO 4 UNITS: 100 INJECTION, SOLUTION INTRAVENOUS; SUBCUTANEOUS at 08:37

## 2022-09-03 NOTE — PLAN OF CARE
Problem: Adult Inpatient Plan of Care  Goal: Absence of Hospital-Acquired Illness or Injury  Outcome: Ongoing, Progressing  Intervention: Identify and Manage Fall Risk  Recent Flowsheet Documentation  Taken 9/2/2022 2000 by Odette Munson RN  Safety Promotion/Fall Prevention:   activity supervised   fall prevention program maintained   safety round/check completed  Intervention: Prevent and Manage VTE (Venous Thromboembolism) Risk  Recent Flowsheet Documentation  Taken 9/2/2022 2000 by Odette Munson RN  Activity Management: activity adjusted per tolerance     Problem: Adult Inpatient Plan of Care  Goal: Absence of Hospital-Acquired Illness or Injury  Intervention: Identify and Manage Fall Risk  Recent Flowsheet Documentation  Taken 9/2/2022 2000 by Odette Munson RN  Safety Promotion/Fall Prevention:   activity supervised   fall prevention program maintained   safety round/check completed     Problem: Adult Inpatient Plan of Care  Goal: Optimal Comfort and Wellbeing  Outcome: Ongoing, Progressing  Intervention: Provide Person-Centered Care  Recent Flowsheet Documentation  Taken 9/2/2022 2000 by Odette Munson RN  Trust Relationship/Rapport:   care explained   emotional support provided   empathic listening provided   questions answered   reassurance provided   thoughts/feelings acknowledged     Problem: Adult Inpatient Plan of Care  Goal: Readiness for Transition of Care  Outcome: Ongoing, Progressing     Problem: Pain Chronic (Persistent) (Comorbidity Management)  Goal: Acceptable Pain Control and Functional Ability  Outcome: Ongoing, Progressing  Intervention: Manage Persistent Pain  Recent Flowsheet Documentation  Taken 9/2/2022 2000 by Odette Munson RN  Medication Review/Management: medications reviewed  Intervention: Optimize Psychosocial Wellbeing  Recent Flowsheet Documentation  Taken 9/2/2022 2000 by Odette Munson RN  Supportive Measures:   active listening utilized   positive reinforcement  provided  Diversional Activities: television  Spiritual Activities Assistance:   affirmation provided   hope instilled  Family/Support System Care: support provided   Goal Outcome Evaluation:  Plan of Care Reviewed With: patient        Progress: improving

## 2022-09-03 NOTE — DISCHARGE SUMMARY
[facility] Hospital Medicine Services  DISCHARGE SUMMARY    Patient Name: Susan Anderson  : 1940  MRN: [id number]    Date of Admission: 2022 10:43 AM  Date of Discharge:  22  Primary Care Physician: Arleen Doherty MD    Consults     Date and Time Order Name Status Description    2022 12:33 AM Inpatient Nephrology Consult      2022 12:33 AM Inpatient Cardiology Consult Completed     2022  2:26 PM Inpatient Nephrology Consult Completed     2022 10:06 AM Inpatient Infectious Diseases Consult Completed     2022  4:00 AM Inpatient Cardiology Consult Completed           Hospital Course     Presenting Problem:   Acute on chronic systolic heart failure (HCC) [I50.23]    Active Hospital Problems    Diagnosis  POA   • **Acute on chronic combined systolic and diastolic CHF (congestive heart failure) ([facility]) [I50.43]  Yes   • Atrial fibrillation ([facility]) [I48.91]  Yes   • Bradycardia [R00.1]  Yes   • Pressure injury of skin of sacral region [L89.159]  Yes   • LACEY (acute kidney injury) ([facility]) [N17.9]  Yes   • Anemia, chronic disease [D63.8]  Yes   • PVD (peripheral vascular disease) ([facility]) [I73.9]  Yes   • Diabetes mellitus with neuropathy ([facility]) [E11.40]  Yes   • CKD (chronic kidney disease), stage III ([facility]) [N18.30]  Yes   • Essential hypertension [I10]  Yes      Resolved Hospital Problems   No resolved problems to display.          Hospital Course:  Susan Anderson is a 82 y.o. female with PMH significant for HTN, HLD, CAD, non-ischemic cardiomyopathy, chronic combined systolic/diastolic CHF, CKD III, PVD, insulin-dependent DMII, ELIUD (on 2L NC QHS, chronic BLE edema, chronic osteomyelitis of right great toe (on chronic suppressive Augmentin per ID), and acoustic neuroma. She was admitted to [facility] from -22 for mild rhabdomyolysis after a fall at home. Also found to have a/c CHF exacerbation and LACEY. ID, nephrology, and cardiology followed.  She discharged to [facility] for rehab on Bumex 1.5mg BID.      Patient evaluated by Alexandria Dior of Cardiology on 8/18/22 after her daughter called with reports of abdominal distension and worsening BLE edema with weeping of her legs in the setting of increased activity with therapy. Patient's Bumex was increased to 2mg BID at [facility], and compression wraps were applied to her BLE. She received 80mg IV Lasix in the heart failure clinic and was sent back to rehab with instructions to monitor urinary output. She was also noted to be in atrial fibrillation at this time and was started on Eliquis and Metoprolol.     3 days later she was sent to [facility] ED from [facility] on 8/21/22 - with lethargy and worsening hypoxemia. Patient reported minimal urinary output since being seen at the heart failure clinic. In the ED, she was found to have an LACEY with evidence of hypervolemia, and bradycardia with a HR in the 40s.  Her troponin was elevated at that time. She was admitted for further work-up and management. Nephrology and cardiology followed. Cardiology felt troponin was elevated d/t demand ischemia in the setting of acute on chronic CHF. She was placed on 1000 ml fluid restriction and received diuresis.  has arranged for rehab at the [facility]. She will be discharged today in stable condition.      Acute Hypercapnic respiratory failure secondary to acute on chronic combined systolic and diastolic CHF  Uncontrolled BP   -Continue bumex 2mg daily, spironolactone   - No ACEi/ARB/Spironolactone or SGLT-2 due to renal dysfunction. No BB due to bradycardia.  Isordil for afterload reduction.    - Cardiology has evaluated, elevated troponin felt to be demand ischemia (Type II mechanism) in the setting of a/c CHF  - echo 8/27:  EF 55, mild-mod MVR  - - Stopped lyrica, avoid voltaren gel/cream  - 1000mL fluid restriction  - UNNA boots in place   -Currently stable on 3L NC      LACEY on CKD IIIb  - Baseline  creatinine appears to be 1.3-1.5  - Creatinine down to 1.1-1.2 prior to DC last admission   - no QUEENIE per prev duplex  - Creatinine improved with diuresis, now stable at 1.37 at last check      Dysphagia    -continue modified diet     HTN  - Bumex as above  - Amlodipine discontinued due to edema  - Continue Hydralazine 25mg TID, Isordil 20mg BID      Scalp itch, chronic  - continue her usual Scalpicin  -  Benadryl  changed to PRN.     - better on norco than percocet      Atrial fibrillation   Bradycardia  - Atrial fibrillation relatively new diagnosis   - started on Metoprolol / Eliquis on 8/18; Metoprolol discontinued due to bradycardia (HR 40's)  -Continue renally-dosed Eliquis      Insulin-dependent DMII  Diabetic peripheral neuropathy  -  levemir to 15units daily       History of osteomyelitis R great toe  - Followed by Dr. Stef Pete of [facility] - has been on Augmentin for ~ 1 year , continue lifelong     Sacral pressure wound, POA   - WOC followed  -continue wound care at rehab     BMI 45 - Complicates volume assessment and all aspects of care    Discharge Follow Up Recommendations for outpatient labs/diagnostics:   Follow up with PCP 1 week after discharge from rehab.  Follow up with Nephrology as scheduled.  Follow up with Heart failure clinic in 1 week.  Follow up with Cardiology in 4 weeks.   Day of Discharge     HPI:   Patient seen resting in bed in no apparent distress. She is agreeable to plan to discharge to the [facility] for rehab. We discussed the importance of taking all medications as prescribed and keeping all recommended follow up appointments. She expressed no new concerns at this time.     Review of Systems  Gen- No fevers, chills  CV- No chest pain, palpitations  Resp- No cough, dyspnea  GI- No N/V/D, abd pain    Vital Signs:   Temp:  [98.2 °F (36.8 °C)-98.7 °F (37.1 °C)] 98.2 °F (36.8 °C)  Heart Rate:  [80-92] 88  Resp:  [18] 18  BP: (149-157)/(78-91) 149/81  Flow (L/min):  [3]  3      Physical Exam:  Constitutional: No acute distress, awake, alert, chronically ill-appearing   HENT: NCAT, mucous membranes moist  Respiratory: diminished in bases, respiratory effort normal on 3L NC   Cardiovascular: RRR, no murmurs, cap refill brisk   Gastrointestinal: Positive bowel sounds, soft, nontender, nondistended  Musculoskeletal: BLE wraps in place   Psychiatric: Appropriate affect, cooperative  Neurologic: Oriented x 3, moves all extremities, speech clear  Skin: warm, dry, BLE as above     Pertinent  and/or Most Recent Results     LAB RESULTS:      Lab 08/28/22  0711   WBC 7.26   HEMOGLOBIN 9.0*   HEMATOCRIT 28.9*   PLATELETS 198   MCV 93.5         Lab 09/02/22  0705 09/02/22  0023 09/01/22  0925 08/30/22  0708 08/29/22  1441 08/28/22  0711   SODIUM  --   --  139 141 142 143   POTASSIUM  --  4.1 3.5 3.0* 3.8 3.3*   CHLORIDE  --   --  91* 93* 96* 100   CO2  --   --  37.0* 33.0* 29.0 29.0   ANION GAP  --   --  11.0 15.0 17.0* 14.0   BUN  --   --  74* 82* 83* 79*   CREATININE  --   --  1.37* 1.33* 1.34* 1.48*   EGFR  --   --  38.4* 39.8* 39.4* 35.0*   GLUCOSE  --   --  178* 143* 135* 136*   CALCIUM  --   --  9.2 9.3 9.3 8.8   MAGNESIUM 1.6  --  1.8 1.8  --   --    PHOSPHORUS  --   --  3.6  --   --  3.5         Lab 09/01/22  0925 08/28/22  0711   ALBUMIN 3.20* 2.90*                     Lab 09/01/22  1205   PH, ARTERIAL 7.437   PCO2, ARTERIAL 60.3*   PO2 ART 76.0*   FIO2 28   HCO3 ART 40.6*   BASE EXCESS ART 14.4*   CARBOXYHEMOGLOBIN 1.5     Brief Urine Lab Results  (Last result in the past 365 days)      Color   Clarity   Blood   Leuk Est   Nitrite   Protein   CREAT   Urine HCG        08/23/22 0650 Yellow   Cloudy   Negative   Small (1+)   Negative   100 mg/dL (2+)               Microbiology Results (last 10 days)     Procedure Component Value - Date/Time    COVID PRE-OP / PRE-PROCEDURE SCREENING ORDER (NO ISOLATION) - Swab, Nasopharynx [[id number]]  (Normal) Collected: 09/02/22 1558    Lab Status: Final  result Specimen: Swab from Nasopharynx Updated: 09/02/22 1712    Narrative:      The following orders were created for panel order COVID PRE-OP / PRE-PROCEDURE SCREENING ORDER (NO ISOLATION) - Swab, Nasopharynx.  Procedure                               Abnormality         Status                     ---------                               -----------         ------                     COVID-19 and FLU A/B PCR...[[id number]]  Normal              Final result                 Please view results for these tests on the individual orders.    COVID-19 and FLU A/B PCR - Swab, Nasopharynx [[id number]]  (Normal) Collected: 09/02/22 1558    Lab Status: Final result Specimen: Swab from Nasopharynx Updated: 09/02/22 1712     COVID19 Not Detected     Influenza A PCR Not Detected     Influenza B PCR Not Detected    Narrative:      Fact sheet for providers: https://www.fda.gov/media/182472/download    Fact sheet for patients: https://www.fda.gov/media/982714/download    Test performed by PCR.    Blood Culture - Blood, Hand, Right [[id number]]  (Normal) Collected: 08/27/22 0927    Lab Status: Final result Specimen: Blood from Hand, Right Updated: 09/01/22 0948     Blood Culture No growth at 5 days    Narrative:      Aerobic bottle only            Adult Transthoracic Echo Complete W/ Cont if Necessary Per Protocol    Result Date: 8/27/2022  · Estimated left ventricular EF = 55% · Mild to moderate mitral valve regurgitation is present.      FL Video Swallow With Speech Single Contrast    Result Date: 8/24/2022  DATE OF EXAM: 8/24/2022 1:59 PM  PROCEDURE: FL VIDEO SWALLOW W SPEECH SINGLE-CONTRAST-  INDICATIONS: DYSPHAGIA, OROPHARYNGEAL, HAS ATTRIBUTABLE CAUSE  COMPARISON: No comparisons available.  TECHNIQUE: This examination was performed in conjunction with speech pathology. Lateral video fluoroscopic evaluation of the swallowing mechanism was performed while correlate administering to the patient various consistency food items mixed  with barium.  Fluoroscopic Time: 1.54 minutes  FINDINGS: A fluoroscopic video assisted modified barium swallow was conducted in conjunction with speech pathology during the oral administration of multiple solid and liquid consistencies mixed with barium contrast for the evaluation of dysphagia.      Technically successful modified barium swallow. Recommend correlation with complete speech pathology report.  This report was finalized on 8/24/2022 2:37 PM by Jah Drew MD.      XR Chest 1 View    Result Date: 8/26/2022  DATE OF EXAM: 8/26/2022 4:22 PM  PROCEDURE: XR CHEST 1 VW-  INDICATIONS: sob; I50.9-Heart failure, unspecified; J96.21-Acute and chronic respiratory failure with hypoxia; R00.1-Bradycardia, unspecified; N17.9-Acute kidney failure, unspecified; D64.9-Anemia, unspecified; R77.8-Other specified abnormalities of plasma proteins; R13.11-Dysphagia, oral phase  COMPARISON: 8/26/2022 at 10:46 AM  TECHNIQUE: Single radiographic AP view of the chest was obtained.  FINDINGS: Current image is timed 4:07 PM. Heart is enlarged. Vasculature is cephalized, but appears improved from prior study, with mildly improved perihilar edema. Bibasilar atelectasis and effusion is again noted, not significant changed overall from prior exam. No pneumothorax is seen.      Congestive heart failure, which appears mildly improved from 10:46 AM exam. Persistent bibasilar atelectasis and effusion.  This report was finalized on 8/26/2022 4:47 PM by Dr. Breezy Rey MD.      XR Chest 1 View    Result Date: 8/26/2022  DATE OF EXAM: 8/26/2022 10:48 AM  PROCEDURE: XR CHEST 1 VW-  INDICATIONS: Adventitious lung sounds; I50.9-Heart failure, unspecified; J96.21-Acute and chronic respiratory failure with hypoxia; R00.1-Bradycardia, unspecified; N17.9-Acute kidney failure, unspecified; D64.9-Anemia, unspecified; R77.8-Other specified abnormalities of plasma proteins; R13.11-Dysphagia, oral phase  COMPARISON: 8/21/2022  TECHNIQUE: Single  radiographic AP view of the chest was obtained.  FINDINGS: Heart shadow is enlarged. Vasculature is cephalized. There is a persistent pattern of perihilar edema and small effusions, very similar to prior exam, perhaps a little increased fluid or atelectasis associated with the right minor fissure. No pneumothorax is seen.      Congestive heart failure and bibasilar pleural effusions similar to prior study. Mildly increased intrafissural fluid or atelectasis along the right minor fissure. No other significant interval change is appreciated.  This report was finalized on 8/26/2022 11:15 AM by Dr. Breezy Rey MD.        Results for orders placed during the hospital encounter of 07/26/22    Duplex Renal Artery - Bilateral Complete CAR    Interpretation Summary  DATE OF EXAM: 8/1/2022 2:11 PM    PROCEDURE: DUPLEX RENAL ARTERY BILATERAL COMPLETE CAR-    INDICATIONS: other    COMPARISON: No comparisons available.    TECHNIQUE: Grayscale, color-flow, and Doppler spectral waveform analysis  was performed of the kidneys, renal vasculature and aorta.    FINDINGS:  The study was limited to due to patient body habitus and limited  cooperation the right kidney has a maximal lyde-xf-wbxm length of 11.2  cm. The left kidney has a maximal jnhz-or-rucr length of 12.2 cm. The  echo pattern of both kidneys is normal. There are no masses and there is  no hydronephrosis. The renal veins are patent. Resistive indices on the  right are 0.7 on the left 0.8 maximal renal flow velocities on the right  five and on the left is 13.1 cm/s. The peak systolic velocity in the  aorta is 107 cm/s. The right renal aortic ratio is 0.5 and the left 0.6.    Impression  1. Limited study due to the patient's body habitus and lack of  cooperation.  2. Elevated resistive indices which may reflect underlying intrinsic  renal disease.  3. No evidence of obstructive uropathy or significant renal artery  stenosis.    This report was finalized on 8/1/2022 4:20 PM  by Trey Saba MD.      Results for orders placed during the hospital encounter of 07/26/22    Duplex Renal Artery - Bilateral Complete CAR    Interpretation Summary  DATE OF EXAM: 8/1/2022 2:11 PM    PROCEDURE: DUPLEX RENAL ARTERY BILATERAL COMPLETE CAR-    INDICATIONS: other    COMPARISON: No comparisons available.    TECHNIQUE: Grayscale, color-flow, and Doppler spectral waveform analysis  was performed of the kidneys, renal vasculature and aorta.    FINDINGS:  The study was limited to due to patient body habitus and limited  cooperation the right kidney has a maximal cfkv-td-goeb length of 11.2  cm. The left kidney has a maximal meko-rt-wdox length of 12.2 cm. The  echo pattern of both kidneys is normal. There are no masses and there is  no hydronephrosis. The renal veins are patent. Resistive indices on the  right are 0.7 on the left 0.8 maximal renal flow velocities on the right  five and on the left is 13.1 cm/s. The peak systolic velocity in the  aorta is 107 cm/s. The right renal aortic ratio is 0.5 and the left 0.6.    Impression  1. Limited study due to the patient's body habitus and lack of  cooperation.  2. Elevated resistive indices which may reflect underlying intrinsic  renal disease.  3. No evidence of obstructive uropathy or significant renal artery  stenosis.    This report was finalized on 8/1/2022 4:20 PM by Trey Saba MD.      Results for orders placed during the hospital encounter of 08/21/22    Adult Transthoracic Echo Complete W/ Cont if Necessary Per Protocol    Interpretation Summary  · Estimated left ventricular EF = 55%  · Mild to moderate mitral valve regurgitation is present.      Plan for Follow-up of Pending Labs/Results: Follow up as directed.     Discharge Details        Discharge Medications      New Medications      Instructions Start Date   diazePAM 5 MG tablet  Commonly known as: Valium   5 mg, Oral, Every 8 Hours PRN      magnesium oxide 400 tablet tablet  Commonly known as:  MAG-OX   400 mg, Oral, Daily   Start Date: September 4, 2022     nystatin 999037 UNIT/GM powder  Commonly known as: MYCOSTATIN   Topical, Every 12 Hours Scheduled      potassium chloride 10 MEQ CR capsule  Commonly known as: MICRO-K  Replaces: potassium chloride 10 MEQ CR tablet   40 mEq, Oral, Daily   Start Date: September 4, 2022     spironolactone 50 MG tablet  Commonly known as: ALDACTONE   50 mg, Oral, Daily   Start Date: September 4, 2022        Changes to Medications      Instructions Start Date   amoxicillin-clavulanate 875-125 MG per tablet  Commonly known as: AUGMENTIN  What changed:   · when to take this  · additional instructions   1 tablet, Oral, Every 12 Hours Scheduled      atorvastatin 40 MG tablet  Commonly known as: LIPITOR  What changed: when to take this   40 mg, Oral, Daily      bisacodyl 10 MG suppository  Commonly known as: DULCOLAX  What changed: Another medication with the same name was removed. Continue taking this medication, and follow the directions you see here.   10 mg, Rectal, Daily PRN      bumetanide 2 MG tablet  Commonly known as: BUMEX  What changed:   · medication strength  · how much to take  · when to take this   2 mg, Oral, Daily   Start Date: September 4, 2022     insulin glargine 100 UNIT/ML injection  Commonly known as: Lantus  What changed: how much to take   30 Units, Subcutaneous, Nightly      insulin lispro 100 UNIT/ML injection  Commonly known as: HumaLOG  What changed:   · when to take this  · additional instructions   12 Units, Subcutaneous, 3 Times Daily Before Meals      magnesium hydroxide 2400 MG/10ML suspension suspension  Commonly known as: MILK OF MAGNESIA  What changed: how much to take   10 mL, Oral, Daily PRN         Continue These Medications      Instructions Start Date   acetaminophen 325 MG tablet  Commonly known as: TYLENOL   650 mg, Oral, Every 4 Hours PRN      apixaban 2.5 MG tablet tablet  Commonly known as: ELIQUIS   2.5 mg, Oral, 2 Times Daily       aspirin 81 MG EC tablet   81 mg, Oral, Daily      castor oil-balsam peru ointment   1 application, Topical, Every 12 Hours Scheduled, Apply to pressure wound on coccyx      Diclofenac Sodium 1 % gel gel  Commonly known as: VOLTAREN   2 g, Topical, 4 Times Daily, Right knee      ferrous sulfate 325 (65 FE) MG tablet   325 mg, Oral, Daily With Breakfast      Glucagon 3 MG/DOSE powder   3 mg, Nasal, 2 Times Daily PRN      guaiFENesin 600 MG 12 hr tablet  Commonly known as: MUCINEX   1,200 mg, Oral, 2 Times Daily      hydrALAZINE 25 MG tablet  Commonly known as: APRESOLINE   25 mg, Oral, Every 8 Hours Scheduled      isosorbide dinitrate 20 MG tablet  Commonly known as: ISORDIL   20 mg, Oral, 2 Times Daily      sennosides-docusate 8.6-50 MG per tablet  Commonly known as: PERICOLACE   1 tablet, Oral, Nightly      vitamin B-12 100 MCG tablet  Commonly known as: CYANOCOBALAMIN   1 tablet, Oral, Daily      Vitamin D3 Super Strength 50 MCG (2000 UT) tablet  Generic drug: Cholecalciferol   2,000 Units, Oral, Daily         Stop These Medications    amLODIPine 5 MG tablet  Commonly known as: NORVASC     cyclobenzaprine 5 MG tablet  Commonly known as: FLEXERIL     melatonin 3 MG tablet     metoprolol tartrate 25 MG tablet  Commonly known as: LOPRESSOR     potassium chloride 10 MEQ CR tablet  Replaced by: potassium chloride 10 MEQ CR capsule     pregabalin 100 MG capsule  Commonly known as: Lyrica            Allergies   Allergen Reactions   • Baclofen Other (See Comments) and Hallucinations     PSYCHOSIS-POA REFUSES ADMINISTRATION OF THIS MED.   • Cephalexin Nausea Only   • Erythromycin Base Nausea Only   • Melatonin Other (See Comments)     Nightmares   • Oxycodone Nausea Only   • Sulfa Antibiotics Nausea Only         Discharge Disposition:  Rehab Facility or Unit (DC - External)    Diet:  Hospital:  Diet Order   Procedures   • Diet Dysphagia; III - Pureed With Some Mashed; Thin; Daily Fluid Restriction, Renal, Low Sodium; 1000  mL Fluid Per Day       Activity:  Activity Instructions     Activity as Tolerated               CODE STATUS:    Code Status and Medical Interventions:   Ordered at: 08/27/22 1306     Medical Intervention Limits:    NO intubation (DNI)     Level Of Support Discussed With:    Patient    Health Care Surrogate     Code Status (Patient has no pulse and is not breathing):    No CPR (Do Not Attempt to Resuscitate)     Medical Interventions (Patient has pulse or is breathing):    Limited Support     Comments:    discussed with daughter at bedside       Future Appointments   Date Time Provider Department Center   9/3/2022 11:30 AM EMS 1 [facility]   9/9/2022 10:15 AM Anjum Ceballos MD [facility]   9/13/2022  2:00 PM Arleen Doherty MD [facility]       Additional Instructions for the Follow-ups that You Need to Schedule     Discharge Follow-up with PCP   As directed       Currently Documented PCP:    Arleen Doherty MD    PCP Phone Number:    [phone]     Follow Up Details: Follow up with PCP in 1 week.         Discharge Follow-up with Specialty: Follow up with ID Dr. Newton as scheduled.   As directed      Specialty: Follow up with ID Dr. Newton as scheduled.         Discharge Follow-up with Specified Provider: Follow up NAL as scheduled   As directed      To: Follow up [facility] as scheduled         Discharge Follow-up with Specified Provider: Follow up with Cardiology in 4 weeks.   As directed      To: Follow up with Cardiology in 4 weeks.         Discharge Follow-up with Specified Provider: Follow up with Heart failure clinic in 1 week after discharge from rehab.   As directed      To: Follow up with Heart failure clinic in 1 week after discharge from rehab.                     Burak Staton, INDIGO  09/03/22      Time Spent on Discharge:  I spent  41  minutes on this discharge activity which included: face-to-face encounter with the patient, reviewing the data in the system, coordination of  the care with the nursing staff as well as consultants, documentation, and entering orders.

## 2022-09-07 ENCOUNTER — PATIENT MESSAGE (OUTPATIENT)
Dept: CARDIOLOGY | Facility: CLINIC | Age: 82
End: 2022-09-07

## 2022-09-09 NOTE — PROGRESS NOTES
[facility]  [facility]      Mode of Visit: Telephone  Location of patient: other: [facility]  You have chosen to receive care through a telehealth visit.  Does the patient consent to use a audio connection for your medical care today? Yes  The visit included an audio interaction only.  No technical issues occurred during this visit.     Chief Complaint  Follow-up and Congestive Heart Failure    History of Present Illness    Susan Anderson is a 82 y.o. female with HF with borderline EF, Nonobstructive coronary artery disease, pleural effusions, peripheral vascular disease, hypertension, CKD stage III, diabetes and new persistent afib who presents today as a telemedicine wisit for acute on chronic HFpEF.  Patient unfortunately was readmitted from 8/21-9/3 with heart failure exacerbation.  She previously had been admitted 7/26-8/12 for mild rhabdomyolysis after a fall at home as well as acute on chronic heart failure and LACEY.  She was seen in our office on 8/18 with worsening heart failure and was given IV Lasix and was also found to be in atrial fibrillation.  She was started on low-dose metoprolol which was later stopped during her hospital stay due to bradycardia.  Creatinine on admission was 2.12, discharge creatinine 1.37. LVEF was 55%. She was placed on 1000 mL fluid restriction and received diuresis.  Amlodipine discontinued due to edema.  Discharged on hydralazine and Isordil and Bumex 2 mg daily.  She was discharged to the [facility]. Her daughter reports that she is doing very well.  Denies edema. She is down a total of 100lbs. Her main concern is anxiety    She is going to see Dr. Soares tomorrow-has a blister on her foot.       Objective     Vital Signs:   Vitals:    09/12/22 1236   BP: 134/61   Pulse: 83   SpO2: 99%   Weight: 75.8 kg (167 lb)     Body mass index is 28.67 kg/m².    Virtual Visit Physical Exam  Physical Exam  Vitals reviewed.   Neurological:      Mental Status: She  is alert and oriented to person, place, and time.   Psychiatric:         Mood and Affect: Mood normal.         Behavior: Behavior normal.           Result Review :   Renal Function Panel (09/01/2022 09:25)  Magnesium (09/02/2022 07:05)  Potassium (09/02/2022 00:23)  Adult Transthoracic Echo Complete W/ Cont if Necessary Per Protocol (08/27/2022 11:35)  Labs 9/6/2022 hemoglobin A1c 7.2.  WBC 6.7, hemoglobin 9.2, hematocrit 30.8, platelets 307.  Glucose 116, sodium 141, potassium 4.5, BUN 36, creatinine 1.4, CO2 41, chloride 90.  Hospital notes reviewed               Assessment and Plan {CC Problem List  Visit Diagnosis  ROS  Review (Popup)  Health Maintenance  Quality  BestPractice  Medications  SmartSets  SnapShot Encounters  Media :23}   1. Acute on chronic heart failure with preserved ejection fraction (HCC)  Stable symptoms  She is down 100 pounds at the hospital.  Continue spironolactone 50 mg daily and Bumex 2 mg daily per nephrology  Continue daily weights at [facility]    2. Stage 3b chronic kidney disease (HCC)  Continue close follow up with nephrology  Last creatinine 1.4 (recent baseline 1.3-1.5)    3. Essential hypertension  Appears well controlled   continue to monitor    4. Atrial fibrillation, controlled (HCC)  Rate controlled and asymptomatic    Keep scheduled appt with Dr. Ceballos    I spent 22 minutes caring for Susan on this date of service. This time includes time spent by me in the following activities:preparing for the visit, reviewing tests, counseling and educating the patient/family/caregiver and documenting information in the medical record    Follow Up {Instructions Charge Capture  Follow-up Communications :23}   No follow-ups on file.    Dictated Utilizing Dragon Dictation

## 2022-09-12 ENCOUNTER — OFFICE VISIT (OUTPATIENT)
Dept: CARDIOLOGY | Facility: HOSPITAL | Age: 82
End: 2022-09-12

## 2022-09-12 VITALS
SYSTOLIC BLOOD PRESSURE: 134 MMHG | BODY MASS INDEX: 28.67 KG/M2 | OXYGEN SATURATION: 99 % | HEART RATE: 83 BPM | DIASTOLIC BLOOD PRESSURE: 61 MMHG | WEIGHT: 167 LBS

## 2022-09-12 DIAGNOSIS — I50.33 ACUTE ON CHRONIC HEART FAILURE WITH PRESERVED EJECTION FRACTION: Primary | ICD-10-CM

## 2022-09-12 DIAGNOSIS — N18.32 STAGE 3B CHRONIC KIDNEY DISEASE: ICD-10-CM

## 2022-09-12 DIAGNOSIS — I48.91 ATRIAL FIBRILLATION, CONTROLLED: ICD-10-CM

## 2022-09-12 DIAGNOSIS — I10 ESSENTIAL HYPERTENSION: ICD-10-CM

## 2022-09-12 PROCEDURE — 99443 PR PHYS/QHP TELEPHONE EVALUATION 21-30 MIN: CPT | Performed by: NURSE PRACTITIONER

## 2022-09-12 RX ORDER — DIAZEPAM 5 MG/1
5 TABLET ORAL EVERY 8 HOURS PRN
COMMUNITY
End: 2022-09-30 | Stop reason: HOSPADM

## 2022-09-12 RX ORDER — DOXYCYCLINE HYCLATE 100 MG/1
100 CAPSULE ORAL 2 TIMES DAILY
COMMUNITY
End: 2022-09-30 | Stop reason: HOSPADM

## 2022-09-21 ENCOUNTER — APPOINTMENT (OUTPATIENT)
Dept: CT IMAGING | Facility: HOSPITAL | Age: 82
End: 2022-09-21

## 2022-09-21 ENCOUNTER — HOSPITAL ENCOUNTER (INPATIENT)
Facility: HOSPITAL | Age: 82
LOS: 9 days | Discharge: HOME | End: 2022-09-30
Attending: EMERGENCY MEDICINE | Admitting: INTERNAL MEDICINE

## 2022-09-21 DIAGNOSIS — L97.516 DIABETIC ULCER OF TOE OF RIGHT FOOT ASSOCIATED WITH TYPE 2 DIABETES MELLITUS, WITH BONE INVOLVEMENT WITHOUT EVIDENCE OF NECROSIS: ICD-10-CM

## 2022-09-21 DIAGNOSIS — R11.2 INTRACTABLE NAUSEA AND VOMITING: ICD-10-CM

## 2022-09-21 DIAGNOSIS — E11.65 TYPE 2 DIABETES MELLITUS WITH HYPERGLYCEMIA, WITH LONG-TERM CURRENT USE OF INSULIN: ICD-10-CM

## 2022-09-21 DIAGNOSIS — Z79.4 TYPE 2 DIABETES MELLITUS WITH HYPERGLYCEMIA, WITH LONG-TERM CURRENT USE OF INSULIN: ICD-10-CM

## 2022-09-21 DIAGNOSIS — E87.5 HYPERKALEMIA: Primary | ICD-10-CM

## 2022-09-21 DIAGNOSIS — E11.621 DIABETIC ULCER OF TOE OF RIGHT FOOT ASSOCIATED WITH TYPE 2 DIABETES MELLITUS, WITH BONE INVOLVEMENT WITHOUT EVIDENCE OF NECROSIS: ICD-10-CM

## 2022-09-21 DIAGNOSIS — I73.9 PVD (PERIPHERAL VASCULAR DISEASE): ICD-10-CM

## 2022-09-21 LAB
ACANTHOCYTES BLD QL SMEAR: ABNORMAL
ALBUMIN SERPL-MCNC: 3.8 G/DL (ref 3.5–5.2)
ALBUMIN/GLOB SERPL: 0.9 G/DL
ALP SERPL-CCNC: 277 U/L (ref 39–117)
ALT SERPL W P-5'-P-CCNC: 34 U/L (ref 1–33)
ANION GAP SERPL CALCULATED.3IONS-SCNC: 13 MMOL/L (ref 5–15)
ANION GAP SERPL CALCULATED.3IONS-SCNC: 16 MMOL/L (ref 5–15)
ANISOCYTOSIS BLD QL: ABNORMAL
AST SERPL-CCNC: 63 U/L (ref 1–32)
BACTERIA UR QL AUTO: ABNORMAL /HPF
BASOPHILS # BLD MANUAL: 0 10*3/MM3 (ref 0–0.2)
BASOPHILS NFR BLD MANUAL: 0 % (ref 0–1.5)
BILIRUB SERPL-MCNC: 0.9 MG/DL (ref 0–1.2)
BILIRUB UR QL STRIP: NEGATIVE
BUN BLDA-MCNC: 52 MG/DL (ref 8–26)
BUN SERPL-MCNC: 45 MG/DL (ref 8–23)
BUN SERPL-MCNC: 45 MG/DL (ref 8–23)
BUN/CREAT SERPL: 23.1 (ref 7–25)
BUN/CREAT SERPL: 23.9 (ref 7–25)
CA-I BLDA-SCNC: 1.03 MMOL/L (ref 1.2–1.32)
CALCIUM SPEC-SCNC: 10 MG/DL (ref 8.6–10.5)
CALCIUM SPEC-SCNC: 9.8 MG/DL (ref 8.6–10.5)
CHLORIDE BLDA-SCNC: 102 MMOL/L (ref 98–109)
CHLORIDE SERPL-SCNC: 97 MMOL/L (ref 98–107)
CHLORIDE SERPL-SCNC: 97 MMOL/L (ref 98–107)
CLARITY UR: CLEAR
CO2 BLDA-SCNC: 24 MMOL/L (ref 24–29)
CO2 SERPL-SCNC: 20 MMOL/L (ref 22–29)
CO2 SERPL-SCNC: 27 MMOL/L (ref 22–29)
COLOR UR: YELLOW
CREAT BLDA-MCNC: 2.2 MG/DL (ref 0.6–1.3)
CREAT SERPL-MCNC: 1.88 MG/DL (ref 0.57–1)
CREAT SERPL-MCNC: 1.95 MG/DL (ref 0.57–1)
D-LACTATE SERPL-SCNC: 1.4 MMOL/L (ref 0.5–2)
DACRYOCYTES BLD QL SMEAR: ABNORMAL
DEPRECATED RDW RBC AUTO: 59.5 FL (ref 37–54)
EGFRCR SERPLBLD CKD-EPI 2021: 21.7 ML/MIN/1.73
EGFRCR SERPLBLD CKD-EPI 2021: 25.1 ML/MIN/1.73
EGFRCR SERPLBLD CKD-EPI 2021: 26.3 ML/MIN/1.73
EOSINOPHIL # BLD MANUAL: 0 10*3/MM3 (ref 0–0.4)
EOSINOPHIL NFR BLD MANUAL: 0 % (ref 0.3–6.2)
ERYTHROCYTE [DISTWIDTH] IN BLOOD BY AUTOMATED COUNT: 18.4 % (ref 12.3–15.4)
FLUAV RNA RESP QL NAA+PROBE: NOT DETECTED
FLUBV RNA RESP QL NAA+PROBE: NOT DETECTED
GLOBULIN UR ELPH-MCNC: 4.2 GM/DL
GLUCOSE BLDC GLUCOMTR-MCNC: 211 MG/DL (ref 70–130)
GLUCOSE BLDC GLUCOMTR-MCNC: 223 MG/DL (ref 70–130)
GLUCOSE SERPL-MCNC: 226 MG/DL (ref 65–99)
GLUCOSE SERPL-MCNC: 232 MG/DL (ref 65–99)
GLUCOSE UR STRIP-MCNC: ABNORMAL MG/DL
HCT VFR BLD AUTO: 45.6 % (ref 34–46.6)
HCT VFR BLDA CALC: 49 % (ref 38–51)
HGB BLD-MCNC: 14.5 G/DL (ref 12–15.9)
HGB BLDA-MCNC: 16.7 G/DL (ref 12–17)
HGB UR QL STRIP.AUTO: ABNORMAL
HOLD SPECIMEN: NORMAL
HYALINE CASTS UR QL AUTO: ABNORMAL /LPF
KETONES UR QL STRIP: NEGATIVE
LEUKOCYTE ESTERASE UR QL STRIP.AUTO: NEGATIVE
LIPASE SERPL-CCNC: 20 U/L (ref 13–60)
LYMPHOCYTES # BLD MANUAL: 1.6 10*3/MM3 (ref 0.7–3.1)
LYMPHOCYTES NFR BLD MANUAL: 3 % (ref 5–12)
MCH RBC QN AUTO: 28.7 PG (ref 26.6–33)
MCHC RBC AUTO-ENTMCNC: 31.8 G/DL (ref 31.5–35.7)
MCV RBC AUTO: 90.1 FL (ref 79–97)
MONOCYTES # BLD: 0.19 10*3/MM3 (ref 0.1–0.9)
NEUTROPHILS # BLD AUTO: 4.61 10*3/MM3 (ref 1.7–7)
NEUTROPHILS NFR BLD MANUAL: 67 % (ref 42.7–76)
NEUTS BAND NFR BLD MANUAL: 5 % (ref 0–5)
NITRITE UR QL STRIP: NEGATIVE
NT-PROBNP SERPL-MCNC: 9562 PG/ML (ref 0–1800)
PH UR STRIP.AUTO: 6 [PH] (ref 5–8)
PLAT MORPH BLD: NORMAL
PLATELET # BLD AUTO: 288 10*3/MM3 (ref 140–450)
PMV BLD AUTO: 10.9 FL (ref 6–12)
POTASSIUM BLDA-SCNC: 6.7 MMOL/L (ref 3.5–4.9)
POTASSIUM SERPL-SCNC: 5.9 MMOL/L (ref 3.5–5.2)
POTASSIUM SERPL-SCNC: 7.2 MMOL/L (ref 3.5–5.2)
PROT SERPL-MCNC: 8 G/DL (ref 6–8.5)
PROT UR QL STRIP: ABNORMAL
RBC # BLD AUTO: 5.06 10*6/MM3 (ref 3.77–5.28)
RBC # UR STRIP: ABNORMAL /HPF
REF LAB TEST METHOD: ABNORMAL
SARS-COV-2 RNA RESP QL NAA+PROBE: NOT DETECTED
SODIUM BLD-SCNC: 131 MMOL/L (ref 138–146)
SODIUM SERPL-SCNC: 133 MMOL/L (ref 136–145)
SODIUM SERPL-SCNC: 137 MMOL/L (ref 136–145)
SP GR UR STRIP: 1.01 (ref 1–1.03)
SQUAMOUS #/AREA URNS HPF: ABNORMAL /HPF
TOXIC GRANULATION: ABNORMAL
UROBILINOGEN UR QL STRIP: ABNORMAL
VARIANT LYMPHS NFR BLD MANUAL: 25 % (ref 19.6–45.3)
WBC # UR STRIP: ABNORMAL /HPF
WBC NRBC COR # BLD: 6.4 10*3/MM3 (ref 3.4–10.8)
WHOLE BLOOD HOLD COAG: NORMAL
WHOLE BLOOD HOLD SPECIMEN: NORMAL

## 2022-09-21 PROCEDURE — 63710000001 INSULIN REGULAR HUMAN PER 5 UNITS: Performed by: EMERGENCY MEDICINE

## 2022-09-21 PROCEDURE — 85014 HEMATOCRIT: CPT

## 2022-09-21 PROCEDURE — 63710000001 INSULIN DETEMIR PER 5 UNITS: Performed by: INTERNAL MEDICINE

## 2022-09-21 PROCEDURE — 85007 BL SMEAR W/DIFF WBC COUNT: CPT | Performed by: EMERGENCY MEDICINE

## 2022-09-21 PROCEDURE — 82962 GLUCOSE BLOOD TEST: CPT

## 2022-09-21 PROCEDURE — 80053 COMPREHEN METABOLIC PANEL: CPT | Performed by: EMERGENCY MEDICINE

## 2022-09-21 PROCEDURE — 83690 ASSAY OF LIPASE: CPT | Performed by: EMERGENCY MEDICINE

## 2022-09-21 PROCEDURE — 83605 ASSAY OF LACTIC ACID: CPT | Performed by: EMERGENCY MEDICINE

## 2022-09-21 PROCEDURE — 25010000002 ONDANSETRON PER 1 MG: Performed by: EMERGENCY MEDICINE

## 2022-09-21 PROCEDURE — 25010000002 CALCIUM GLUCONATE-NACL 1-0.675 GM/50ML-% SOLUTION: Performed by: EMERGENCY MEDICINE

## 2022-09-21 PROCEDURE — 80047 BASIC METABLC PNL IONIZED CA: CPT

## 2022-09-21 PROCEDURE — 83880 ASSAY OF NATRIURETIC PEPTIDE: CPT | Performed by: EMERGENCY MEDICINE

## 2022-09-21 PROCEDURE — 87040 BLOOD CULTURE FOR BACTERIA: CPT | Performed by: INTERNAL MEDICINE

## 2022-09-21 PROCEDURE — 99291 CRITICAL CARE FIRST HOUR: CPT | Performed by: INTERNAL MEDICINE

## 2022-09-21 PROCEDURE — 87636 SARSCOV2 & INF A&B AMP PRB: CPT | Performed by: EMERGENCY MEDICINE

## 2022-09-21 PROCEDURE — 74176 CT ABD & PELVIS W/O CONTRAST: CPT

## 2022-09-21 PROCEDURE — 81001 URINALYSIS AUTO W/SCOPE: CPT | Performed by: INTERNAL MEDICINE

## 2022-09-21 PROCEDURE — 85025 COMPLETE CBC W/AUTO DIFF WBC: CPT | Performed by: EMERGENCY MEDICINE

## 2022-09-21 PROCEDURE — 99284 EMERGENCY DEPT VISIT MOD MDM: CPT

## 2022-09-21 RX ORDER — BISACODYL 5 MG/1
10 TABLET, DELAYED RELEASE ORAL DAILY
Status: DISCONTINUED | OUTPATIENT
Start: 2022-09-21 | End: 2022-09-21

## 2022-09-21 RX ORDER — POLYETHYLENE GLYCOL 3350 17 G/17G
17 POWDER, FOR SOLUTION ORAL 2 TIMES DAILY
Status: DISCONTINUED | OUTPATIENT
Start: 2022-09-21 | End: 2022-09-21

## 2022-09-21 RX ORDER — IPRATROPIUM BROMIDE AND ALBUTEROL SULFATE 2.5; .5 MG/3ML; MG/3ML
3 SOLUTION RESPIRATORY (INHALATION) EVERY 4 HOURS PRN
Status: DISCONTINUED | OUTPATIENT
Start: 2022-09-21 | End: 2022-09-30 | Stop reason: HOSPADM

## 2022-09-21 RX ORDER — DEXTROSE MONOHYDRATE 25 G/50ML
50 INJECTION, SOLUTION INTRAVENOUS ONCE
Status: COMPLETED | OUTPATIENT
Start: 2022-09-21 | End: 2022-09-21

## 2022-09-21 RX ORDER — AMOXICILLIN 250 MG
2 CAPSULE ORAL 2 TIMES DAILY
Status: DISCONTINUED | OUTPATIENT
Start: 2022-09-21 | End: 2022-09-21

## 2022-09-21 RX ORDER — SODIUM CHLORIDE 9 MG/ML
1000 INJECTION, SOLUTION INTRAVENOUS CONTINUOUS
Status: ACTIVE | OUTPATIENT
Start: 2022-09-21 | End: 2022-09-21

## 2022-09-21 RX ORDER — ONDANSETRON 4 MG/1
4 TABLET, ORALLY DISINTEGRATING ORAL EVERY 8 HOURS PRN
COMMUNITY
End: 2022-10-25 | Stop reason: SDUPTHER

## 2022-09-21 RX ORDER — SODIUM CHLORIDE, SODIUM LACTATE, POTASSIUM CHLORIDE, CALCIUM CHLORIDE 600; 310; 30; 20 MG/100ML; MG/100ML; MG/100ML; MG/100ML
100 INJECTION, SOLUTION INTRAVENOUS CONTINUOUS
Status: ACTIVE | OUTPATIENT
Start: 2022-09-21 | End: 2022-09-22

## 2022-09-21 RX ORDER — ONDANSETRON 2 MG/ML
4 INJECTION INTRAMUSCULAR; INTRAVENOUS EVERY 6 HOURS PRN
Status: DISCONTINUED | OUTPATIENT
Start: 2022-09-21 | End: 2022-09-30 | Stop reason: HOSPADM

## 2022-09-21 RX ORDER — DEXTROSE MONOHYDRATE 25 G/50ML
25 INJECTION, SOLUTION INTRAVENOUS
Status: DISCONTINUED | OUTPATIENT
Start: 2022-09-21 | End: 2022-09-21 | Stop reason: SDUPTHER

## 2022-09-21 RX ORDER — POLYETHYLENE GLYCOL 3350 17 G/17G
17 POWDER, FOR SOLUTION ORAL DAILY PRN
Status: DISCONTINUED | OUTPATIENT
Start: 2022-09-21 | End: 2022-09-30 | Stop reason: HOSPADM

## 2022-09-21 RX ORDER — ONDANSETRON 2 MG/ML
4 INJECTION INTRAMUSCULAR; INTRAVENOUS ONCE
Status: COMPLETED | OUTPATIENT
Start: 2022-09-21 | End: 2022-09-21

## 2022-09-21 RX ORDER — AMOXICILLIN 250 MG
2 CAPSULE ORAL 2 TIMES DAILY
Status: DISCONTINUED | OUTPATIENT
Start: 2022-09-21 | End: 2022-09-30 | Stop reason: HOSPADM

## 2022-09-21 RX ORDER — PROMETHAZINE HYDROCHLORIDE 25 MG/1
25 SUPPOSITORY RECTAL EVERY 6 HOURS PRN
COMMUNITY
End: 2022-09-30 | Stop reason: HOSPADM

## 2022-09-21 RX ORDER — SUCRALFATE 1 G/1
1 TABLET ORAL
Status: DISPENSED | OUTPATIENT
Start: 2022-09-21 | End: 2022-09-23

## 2022-09-21 RX ORDER — DEXTROSE MONOHYDRATE 25 G/50ML
25 INJECTION, SOLUTION INTRAVENOUS
Status: DISCONTINUED | OUTPATIENT
Start: 2022-09-21 | End: 2022-09-30 | Stop reason: HOSPADM

## 2022-09-21 RX ORDER — ATORVASTATIN CALCIUM 40 MG/1
40 TABLET, FILM COATED ORAL NIGHTLY
Status: DISCONTINUED | OUTPATIENT
Start: 2022-09-21 | End: 2022-09-30 | Stop reason: HOSPADM

## 2022-09-21 RX ORDER — FAMOTIDINE 10 MG/ML
20 INJECTION, SOLUTION INTRAVENOUS EVERY 12 HOURS SCHEDULED
Status: DISCONTINUED | OUTPATIENT
Start: 2022-09-22 | End: 2022-09-22

## 2022-09-21 RX ORDER — COLLAGENASE SANTYL 250 [ARB'U]/G
1 OINTMENT TOPICAL DAILY
COMMUNITY
End: 2022-09-30 | Stop reason: HOSPADM

## 2022-09-21 RX ORDER — BISACODYL 5 MG/1
5 TABLET, DELAYED RELEASE ORAL DAILY PRN
Status: DISCONTINUED | OUTPATIENT
Start: 2022-09-21 | End: 2022-09-30 | Stop reason: HOSPADM

## 2022-09-21 RX ORDER — ALBUTEROL SULFATE 2.5 MG/3ML
5 SOLUTION RESPIRATORY (INHALATION) ONCE
Status: DISCONTINUED | OUTPATIENT
Start: 2022-09-21 | End: 2022-09-21

## 2022-09-21 RX ORDER — SODIUM HYPOCHLORITE 1.25 MG/ML
SOLUTION TOPICAL
COMMUNITY
End: 2022-09-30 | Stop reason: HOSPADM

## 2022-09-21 RX ORDER — ONDANSETRON 4 MG/1
4 TABLET, ORALLY DISINTEGRATING ORAL ONCE
Status: DISCONTINUED | OUTPATIENT
Start: 2022-09-21 | End: 2022-09-21

## 2022-09-21 RX ORDER — DIPHENHYDRAMINE HCL 25 MG
25 CAPSULE ORAL EVERY 6 HOURS PRN
COMMUNITY
End: 2022-10-25

## 2022-09-21 RX ORDER — NICOTINE POLACRILEX 4 MG
15 LOZENGE BUCCAL
Status: DISCONTINUED | OUTPATIENT
Start: 2022-09-21 | End: 2022-09-30 | Stop reason: HOSPADM

## 2022-09-21 RX ORDER — BISACODYL 10 MG
10 SUPPOSITORY, RECTAL RECTAL DAILY PRN
Status: DISCONTINUED | OUTPATIENT
Start: 2022-09-21 | End: 2022-09-30 | Stop reason: HOSPADM

## 2022-09-21 RX ORDER — ASPIRIN 81 MG/1
81 TABLET ORAL DAILY
Status: DISCONTINUED | OUTPATIENT
Start: 2022-09-22 | End: 2022-09-30 | Stop reason: HOSPADM

## 2022-09-21 RX ORDER — INSULIN LISPRO 100 [IU]/ML
0-7 INJECTION, SOLUTION INTRAVENOUS; SUBCUTANEOUS
Status: DISCONTINUED | OUTPATIENT
Start: 2022-09-21 | End: 2022-09-21 | Stop reason: SDUPTHER

## 2022-09-21 RX ORDER — CALCIUM GLUCONATE 20 MG/ML
1 INJECTION, SOLUTION INTRAVENOUS ONCE
Status: COMPLETED | OUTPATIENT
Start: 2022-09-21 | End: 2022-09-21

## 2022-09-21 RX ORDER — FAMOTIDINE 10 MG/ML
20 INJECTION, SOLUTION INTRAVENOUS DAILY
Status: DISCONTINUED | OUTPATIENT
Start: 2022-09-21 | End: 2022-09-21

## 2022-09-21 RX ORDER — SODIUM CHLORIDE 9 MG/ML
10 INJECTION INTRAVENOUS AS NEEDED
Status: DISCONTINUED | OUTPATIENT
Start: 2022-09-21 | End: 2022-09-30 | Stop reason: HOSPADM

## 2022-09-21 RX ORDER — DIAZEPAM 5 MG/1
5 TABLET ORAL EVERY 8 HOURS PRN
Status: DISCONTINUED | OUTPATIENT
Start: 2022-09-21 | End: 2022-09-30 | Stop reason: HOSPADM

## 2022-09-21 RX ORDER — NICOTINE POLACRILEX 4 MG
15 LOZENGE BUCCAL
Status: DISCONTINUED | OUTPATIENT
Start: 2022-09-21 | End: 2022-09-21 | Stop reason: SDUPTHER

## 2022-09-21 RX ADMIN — ONDANSETRON 4 MG: 2 INJECTION INTRAMUSCULAR; INTRAVENOUS at 14:36

## 2022-09-21 RX ADMIN — DIAZEPAM 5 MG: 5 TABLET ORAL at 18:16

## 2022-09-21 RX ADMIN — SODIUM CHLORIDE 500 ML: 9 INJECTION, SOLUTION INTRAVENOUS at 17:20

## 2022-09-21 RX ADMIN — INSULIN HUMAN 10 UNITS: 100 INJECTION, SOLUTION PARENTERAL at 17:33

## 2022-09-21 RX ADMIN — DEXTROSE MONOHYDRATE 50 ML: 25 INJECTION, SOLUTION INTRAVENOUS at 17:27

## 2022-09-21 RX ADMIN — INSULIN DETEMIR 15 UNITS: 100 INJECTION, SOLUTION SUBCUTANEOUS at 22:31

## 2022-09-21 RX ADMIN — SODIUM CHLORIDE, POTASSIUM CHLORIDE, SODIUM LACTATE AND CALCIUM CHLORIDE 100 ML/HR: 600; 310; 30; 20 INJECTION, SOLUTION INTRAVENOUS at 19:14

## 2022-09-21 RX ADMIN — ONDANSETRON 4 MG: 2 INJECTION INTRAMUSCULAR; INTRAVENOUS at 17:25

## 2022-09-21 RX ADMIN — SODIUM ZIRCONIUM CYCLOSILICATE 10 G: 10 POWDER, FOR SUSPENSION ORAL at 17:28

## 2022-09-21 RX ADMIN — FAMOTIDINE 20 MG: 10 INJECTION, SOLUTION INTRAVENOUS at 17:36

## 2022-09-21 RX ADMIN — SODIUM BICARBONATE 50 MEQ: 84 INJECTION INTRAVENOUS at 17:21

## 2022-09-21 RX ADMIN — ATORVASTATIN CALCIUM 40 MG: 40 TABLET, FILM COATED ORAL at 22:30

## 2022-09-21 RX ADMIN — APIXABAN 2.5 MG: 2.5 TABLET, FILM COATED ORAL at 22:30

## 2022-09-21 RX ADMIN — SUCRALFATE 1 G: 1 TABLET ORAL at 22:30

## 2022-09-21 RX ADMIN — CALCIUM GLUCONATE 1 G: 20 INJECTION, SOLUTION INTRAVENOUS at 17:39

## 2022-09-22 PROBLEM — J96.01 ACUTE HYPOXEMIC RESPIRATORY FAILURE: Status: RESOLVED | Noted: 2021-06-05 | Resolved: 2022-09-22

## 2022-09-22 PROBLEM — R00.1 BRADYCARDIA: Status: RESOLVED | Noted: 2022-08-21 | Resolved: 2022-09-22

## 2022-09-22 PROBLEM — N17.9 AKI (ACUTE KIDNEY INJURY): Status: RESOLVED | Noted: 2022-08-12 | Resolved: 2022-09-22

## 2022-09-22 PROBLEM — I50.43 ACUTE ON CHRONIC COMBINED SYSTOLIC AND DIASTOLIC CHF (CONGESTIVE HEART FAILURE): Status: RESOLVED | Noted: 2022-07-27 | Resolved: 2022-09-22

## 2022-09-22 LAB
ACANTHOCYTES BLD QL SMEAR: ABNORMAL
ALBUMIN SERPL-MCNC: 3.5 G/DL (ref 3.5–5.2)
ALBUMIN/GLOB SERPL: 1.1 G/DL
ALP SERPL-CCNC: 224 U/L (ref 39–117)
ALT SERPL W P-5'-P-CCNC: 24 U/L (ref 1–33)
ANION GAP SERPL CALCULATED.3IONS-SCNC: 11 MMOL/L (ref 5–15)
ARTERIAL PATENCY WRIST A: ABNORMAL
AST SERPL-CCNC: 36 U/L (ref 1–32)
ATMOSPHERIC PRESS: ABNORMAL MM[HG]
BASE EXCESS BLDA CALC-SCNC: 5.1 MMOL/L (ref 0–2)
BASOPHILS # BLD MANUAL: 0 10*3/MM3 (ref 0–0.2)
BASOPHILS NFR BLD MANUAL: 0 % (ref 0–1.5)
BDY SITE: ABNORMAL
BILIRUB SERPL-MCNC: 0.8 MG/DL (ref 0–1.2)
BODY TEMPERATURE: 37 C
BUN SERPL-MCNC: 45 MG/DL (ref 8–23)
BUN/CREAT SERPL: 24.6 (ref 7–25)
CALCIUM SPEC-SCNC: 9.6 MG/DL (ref 8.6–10.5)
CHLORIDE SERPL-SCNC: 98 MMOL/L (ref 98–107)
CO2 BLDA-SCNC: 32.8 MMOL/L (ref 22–33)
CO2 SERPL-SCNC: 28 MMOL/L (ref 22–29)
COHGB MFR BLD: 1.1 % (ref 0–2)
CREAT SERPL-MCNC: 1.83 MG/DL (ref 0.57–1)
DACRYOCYTES BLD QL SMEAR: ABNORMAL
DEPRECATED RDW RBC AUTO: 58.8 FL (ref 37–54)
EGFRCR SERPLBLD CKD-EPI 2021: 27.1 ML/MIN/1.73
EOSINOPHIL # BLD MANUAL: 0 10*3/MM3 (ref 0–0.4)
EOSINOPHIL NFR BLD MANUAL: 0 % (ref 0.3–6.2)
EPAP: 0
ERYTHROCYTE [DISTWIDTH] IN BLOOD BY AUTOMATED COUNT: 18.1 % (ref 12.3–15.4)
GLOBULIN UR ELPH-MCNC: 3.2 GM/DL
GLUCOSE BLDC GLUCOMTR-MCNC: 127 MG/DL (ref 70–130)
GLUCOSE BLDC GLUCOMTR-MCNC: 130 MG/DL (ref 70–130)
GLUCOSE BLDC GLUCOMTR-MCNC: 186 MG/DL (ref 70–130)
GLUCOSE BLDC GLUCOMTR-MCNC: 192 MG/DL (ref 70–130)
GLUCOSE BLDC GLUCOMTR-MCNC: 233 MG/DL (ref 70–130)
GLUCOSE BLDC GLUCOMTR-MCNC: 92 MG/DL (ref 70–130)
GLUCOSE BLDC GLUCOMTR-MCNC: 99 MG/DL (ref 70–130)
GLUCOSE SERPL-MCNC: 192 MG/DL (ref 65–99)
HCO3 BLDA-SCNC: 31.2 MMOL/L (ref 20–26)
HCT VFR BLD AUTO: 37.5 % (ref 34–46.6)
HCT VFR BLD CALC: 36 % (ref 38–51)
HGB BLD-MCNC: 12.1 G/DL (ref 12–15.9)
HGB BLDA-MCNC: 11.7 G/DL (ref 14–18)
INHALED O2 CONCENTRATION: 21 %
IPAP: 0
LYMPHOCYTES # BLD MANUAL: 0.68 10*3/MM3 (ref 0.7–3.1)
LYMPHOCYTES NFR BLD MANUAL: 1 % (ref 5–12)
MAGNESIUM SERPL-MCNC: 1.7 MG/DL (ref 1.6–2.4)
MCH RBC QN AUTO: 28.9 PG (ref 26.6–33)
MCHC RBC AUTO-ENTMCNC: 32.3 G/DL (ref 31.5–35.7)
MCV RBC AUTO: 89.5 FL (ref 79–97)
METHGB BLD QL: 0.5 % (ref 0–1.5)
MODALITY: ABNORMAL
MONOCYTES # BLD: 0.07 10*3/MM3 (ref 0.1–0.9)
NEUTROPHILS # BLD AUTO: 5.99 10*3/MM3 (ref 1.7–7)
NEUTROPHILS NFR BLD MANUAL: 85.9 % (ref 42.7–76)
NEUTS BAND NFR BLD MANUAL: 3 % (ref 0–5)
NOTE: ABNORMAL
OVALOCYTES BLD QL SMEAR: ABNORMAL
OXYHGB MFR BLDV: 91.9 % (ref 94–99)
PAW @ PEAK INSP FLOW SETTING VENT: 0 CMH2O
PCO2 BLDA: 51.7 MM HG (ref 35–45)
PCO2 TEMP ADJ BLD: 51.7 MM HG (ref 35–45)
PH BLDA: 7.39 PH UNITS (ref 7.35–7.45)
PH, TEMP CORRECTED: 7.39 PH UNITS
PHOSPHATE SERPL-MCNC: 4 MG/DL (ref 2.5–4.5)
PLAT MORPH BLD: NORMAL
PLATELET # BLD AUTO: 249 10*3/MM3 (ref 140–450)
PMV BLD AUTO: 11.2 FL (ref 6–12)
PO2 BLDA: 69.9 MM HG (ref 83–108)
PO2 TEMP ADJ BLD: 69.9 MM HG (ref 83–108)
POTASSIUM SERPL-SCNC: 5.2 MMOL/L (ref 3.5–5.2)
PROT SERPL-MCNC: 6.7 G/DL (ref 6–8.5)
RBC # BLD AUTO: 4.19 10*6/MM3 (ref 3.77–5.28)
SODIUM SERPL-SCNC: 137 MMOL/L (ref 136–145)
TOTAL RATE: 0 BREATHS/MINUTE
VARIANT LYMPHS NFR BLD MANUAL: 10.1 % (ref 19.6–45.3)
WBC MORPH BLD: NORMAL
WBC NRBC COR # BLD: 6.74 10*3/MM3 (ref 3.4–10.8)

## 2022-09-22 PROCEDURE — 85007 BL SMEAR W/DIFF WBC COUNT: CPT | Performed by: INTERNAL MEDICINE

## 2022-09-22 PROCEDURE — 99222 1ST HOSP IP/OBS MODERATE 55: CPT | Performed by: INTERNAL MEDICINE

## 2022-09-22 PROCEDURE — 84100 ASSAY OF PHOSPHORUS: CPT | Performed by: NURSE PRACTITIONER

## 2022-09-22 PROCEDURE — 99233 SBSQ HOSP IP/OBS HIGH 50: CPT | Performed by: INTERNAL MEDICINE

## 2022-09-22 PROCEDURE — 36600 WITHDRAWAL OF ARTERIAL BLOOD: CPT

## 2022-09-22 PROCEDURE — 63710000001 INSULIN DETEMIR PER 5 UNITS: Performed by: INTERNAL MEDICINE

## 2022-09-22 PROCEDURE — 80053 COMPREHEN METABOLIC PANEL: CPT | Performed by: NURSE PRACTITIONER

## 2022-09-22 PROCEDURE — 83735 ASSAY OF MAGNESIUM: CPT | Performed by: NURSE PRACTITIONER

## 2022-09-22 PROCEDURE — 82962 GLUCOSE BLOOD TEST: CPT

## 2022-09-22 PROCEDURE — 83050 HGB METHEMOGLOBIN QUAN: CPT

## 2022-09-22 PROCEDURE — 85025 COMPLETE CBC W/AUTO DIFF WBC: CPT | Performed by: INTERNAL MEDICINE

## 2022-09-22 PROCEDURE — 82805 BLOOD GASES W/O2 SATURATION: CPT

## 2022-09-22 PROCEDURE — 82375 ASSAY CARBOXYHB QUANT: CPT

## 2022-09-22 PROCEDURE — 0 MAGNESIUM SULFATE 4 GM/100ML SOLUTION: Performed by: NURSE PRACTITIONER

## 2022-09-22 RX ORDER — FAMOTIDINE 10 MG/ML
20 INJECTION, SOLUTION INTRAVENOUS DAILY
Status: DISCONTINUED | OUTPATIENT
Start: 2022-09-22 | End: 2022-09-22

## 2022-09-22 RX ORDER — MAGNESIUM SULFATE HEPTAHYDRATE 40 MG/ML
4 INJECTION, SOLUTION INTRAVENOUS ONCE
Status: COMPLETED | OUTPATIENT
Start: 2022-09-22 | End: 2022-09-22

## 2022-09-22 RX ORDER — FAMOTIDINE 20 MG/1
20 TABLET, FILM COATED ORAL
Status: DISCONTINUED | OUTPATIENT
Start: 2022-09-22 | End: 2022-09-30 | Stop reason: HOSPADM

## 2022-09-22 RX ADMIN — FAMOTIDINE 20 MG: 10 INJECTION, SOLUTION INTRAVENOUS at 08:51

## 2022-09-22 RX ADMIN — SENNOSIDES AND DOCUSATE SODIUM 2 TABLET: 50; 8.6 TABLET ORAL at 20:04

## 2022-09-22 RX ADMIN — ATORVASTATIN CALCIUM 40 MG: 40 TABLET, FILM COATED ORAL at 20:04

## 2022-09-22 RX ADMIN — APIXABAN 2.5 MG: 2.5 TABLET, FILM COATED ORAL at 10:15

## 2022-09-22 RX ADMIN — MAGNESIUM SULFATE HEPTAHYDRATE 4 G: 40 INJECTION, SOLUTION INTRAVENOUS at 03:45

## 2022-09-22 RX ADMIN — SENNOSIDES AND DOCUSATE SODIUM 2 TABLET: 50; 8.6 TABLET ORAL at 10:15

## 2022-09-22 RX ADMIN — ASPIRIN 81 MG: 81 TABLET, COATED ORAL at 10:15

## 2022-09-22 RX ADMIN — SODIUM CHLORIDE, POTASSIUM CHLORIDE, SODIUM LACTATE AND CALCIUM CHLORIDE 100 ML/HR: 600; 310; 30; 20 INJECTION, SOLUTION INTRAVENOUS at 13:00

## 2022-09-22 RX ADMIN — INSULIN DETEMIR 15 UNITS: 100 INJECTION, SOLUTION SUBCUTANEOUS at 20:04

## 2022-09-22 RX ADMIN — SUCRALFATE 1 G: 1 TABLET ORAL at 11:45

## 2022-09-22 RX ADMIN — APIXABAN 2.5 MG: 2.5 TABLET, FILM COATED ORAL at 20:04

## 2022-09-22 RX ADMIN — FAMOTIDINE 20 MG: 20 TABLET ORAL at 18:02

## 2022-09-23 ENCOUNTER — APPOINTMENT (OUTPATIENT)
Dept: CARDIOLOGY | Facility: HOSPITAL | Age: 82
End: 2022-09-23

## 2022-09-23 LAB
ALBUMIN SERPL-MCNC: 3.1 G/DL (ref 3.5–5.2)
ANION GAP SERPL CALCULATED.3IONS-SCNC: 9 MMOL/L (ref 5–15)
BUN SERPL-MCNC: 31 MG/DL (ref 8–23)
BUN/CREAT SERPL: 20.9 (ref 7–25)
CALCIUM SPEC-SCNC: 9.1 MG/DL (ref 8.6–10.5)
CHLORIDE SERPL-SCNC: 99 MMOL/L (ref 98–107)
CO2 SERPL-SCNC: 29 MMOL/L (ref 22–29)
CREAT SERPL-MCNC: 1.48 MG/DL (ref 0.57–1)
DEPRECATED RDW RBC AUTO: 58.9 FL (ref 37–54)
EGFRCR SERPLBLD CKD-EPI 2021: 35 ML/MIN/1.73
ERYTHROCYTE [DISTWIDTH] IN BLOOD BY AUTOMATED COUNT: 17.8 % (ref 12.3–15.4)
GLUCOSE BLDC GLUCOMTR-MCNC: 108 MG/DL (ref 70–130)
GLUCOSE BLDC GLUCOMTR-MCNC: 118 MG/DL (ref 70–130)
GLUCOSE BLDC GLUCOMTR-MCNC: 153 MG/DL (ref 70–130)
GLUCOSE BLDC GLUCOMTR-MCNC: 156 MG/DL (ref 70–130)
GLUCOSE BLDC GLUCOMTR-MCNC: 184 MG/DL (ref 70–130)
GLUCOSE SERPL-MCNC: 142 MG/DL (ref 65–99)
HCT VFR BLD AUTO: 35.9 % (ref 34–46.6)
HGB BLD-MCNC: 11.5 G/DL (ref 12–15.9)
MAGNESIUM SERPL-MCNC: 2.1 MG/DL (ref 1.6–2.4)
MCH RBC QN AUTO: 28.8 PG (ref 26.6–33)
MCHC RBC AUTO-ENTMCNC: 32 G/DL (ref 31.5–35.7)
MCV RBC AUTO: 89.8 FL (ref 79–97)
PHOSPHATE SERPL-MCNC: 3.1 MG/DL (ref 2.5–4.5)
PLATELET # BLD AUTO: 192 10*3/MM3 (ref 140–450)
PMV BLD AUTO: 9.6 FL (ref 6–12)
POTASSIUM SERPL-SCNC: 4.7 MMOL/L (ref 3.5–5.2)
RBC # BLD AUTO: 4 10*6/MM3 (ref 3.77–5.28)
SODIUM SERPL-SCNC: 137 MMOL/L (ref 136–145)
WBC NRBC COR # BLD: 6.11 10*3/MM3 (ref 3.4–10.8)

## 2022-09-23 PROCEDURE — 99232 SBSQ HOSP IP/OBS MODERATE 35: CPT | Performed by: INTERNAL MEDICINE

## 2022-09-23 PROCEDURE — 80069 RENAL FUNCTION PANEL: CPT | Performed by: INTERNAL MEDICINE

## 2022-09-23 PROCEDURE — 97165 OT EVAL LOW COMPLEX 30 MIN: CPT

## 2022-09-23 PROCEDURE — 83735 ASSAY OF MAGNESIUM: CPT | Performed by: INTERNAL MEDICINE

## 2022-09-23 PROCEDURE — 99232 SBSQ HOSP IP/OBS MODERATE 35: CPT

## 2022-09-23 PROCEDURE — 97162 PT EVAL MOD COMPLEX 30 MIN: CPT

## 2022-09-23 PROCEDURE — 63710000001 INSULIN REGULAR HUMAN PER 5 UNITS: Performed by: NURSE PRACTITIONER

## 2022-09-23 PROCEDURE — 93925 LOWER EXTREMITY STUDY: CPT | Performed by: INTERNAL MEDICINE

## 2022-09-23 PROCEDURE — 93925 LOWER EXTREMITY STUDY: CPT

## 2022-09-23 PROCEDURE — 63710000001 INSULIN LISPRO (HUMAN) PER 5 UNITS: Performed by: NURSE PRACTITIONER

## 2022-09-23 PROCEDURE — 85027 COMPLETE CBC AUTOMATED: CPT | Performed by: INTERNAL MEDICINE

## 2022-09-23 PROCEDURE — 63710000001 INSULIN DETEMIR PER 5 UNITS: Performed by: INTERNAL MEDICINE

## 2022-09-23 PROCEDURE — 82962 GLUCOSE BLOOD TEST: CPT

## 2022-09-23 RX ORDER — ACETAMINOPHEN 325 MG/1
650 TABLET ORAL EVERY 6 HOURS PRN
Status: DISCONTINUED | OUTPATIENT
Start: 2022-09-23 | End: 2022-09-30 | Stop reason: HOSPADM

## 2022-09-23 RX ORDER — INSULIN LISPRO 100 [IU]/ML
0-9 INJECTION, SOLUTION INTRAVENOUS; SUBCUTANEOUS
Status: DISCONTINUED | OUTPATIENT
Start: 2022-09-23 | End: 2022-09-30 | Stop reason: HOSPADM

## 2022-09-23 RX ORDER — HYDRALAZINE HYDROCHLORIDE 25 MG/1
25 TABLET, FILM COATED ORAL EVERY 8 HOURS SCHEDULED
Status: DISCONTINUED | OUTPATIENT
Start: 2022-09-23 | End: 2022-09-30 | Stop reason: HOSPADM

## 2022-09-23 RX ORDER — AMOXICILLIN AND CLAVULANATE POTASSIUM 875; 125 MG/1; MG/1
1 TABLET, FILM COATED ORAL EVERY 12 HOURS SCHEDULED
Status: DISCONTINUED | OUTPATIENT
Start: 2022-09-23 | End: 2022-09-27

## 2022-09-23 RX ADMIN — ASPIRIN 81 MG: 81 TABLET, COATED ORAL at 10:51

## 2022-09-23 RX ADMIN — ACETAMINOPHEN 650 MG: 325 TABLET, FILM COATED ORAL at 17:23

## 2022-09-23 RX ADMIN — INSULIN DETEMIR 15 UNITS: 100 INJECTION, SOLUTION SUBCUTANEOUS at 20:37

## 2022-09-23 RX ADMIN — INSULIN DETEMIR 15 UNITS: 100 INJECTION, SOLUTION SUBCUTANEOUS at 10:52

## 2022-09-23 RX ADMIN — AMOXICILLIN AND CLAVULANATE POTASSIUM 1 TABLET: 875; 125 TABLET, FILM COATED ORAL at 20:21

## 2022-09-23 RX ADMIN — FAMOTIDINE 20 MG: 20 TABLET ORAL at 16:35

## 2022-09-23 RX ADMIN — APIXABAN 2.5 MG: 2.5 TABLET, FILM COATED ORAL at 10:50

## 2022-09-23 RX ADMIN — HYDRALAZINE HYDROCHLORIDE 25 MG: 25 TABLET ORAL at 21:15

## 2022-09-23 RX ADMIN — SENNOSIDES AND DOCUSATE SODIUM 2 TABLET: 50; 8.6 TABLET ORAL at 20:23

## 2022-09-23 RX ADMIN — APIXABAN 2.5 MG: 2.5 TABLET, FILM COATED ORAL at 20:21

## 2022-09-23 RX ADMIN — HYDRALAZINE HYDROCHLORIDE 25 MG: 25 TABLET ORAL at 10:51

## 2022-09-23 RX ADMIN — ATORVASTATIN CALCIUM 40 MG: 40 TABLET, FILM COATED ORAL at 20:21

## 2022-09-23 RX ADMIN — SENNOSIDES AND DOCUSATE SODIUM 2 TABLET: 50; 8.6 TABLET ORAL at 10:50

## 2022-09-23 RX ADMIN — INSULIN LISPRO 2 UNITS: 100 INJECTION, SOLUTION INTRAVENOUS; SUBCUTANEOUS at 20:32

## 2022-09-23 RX ADMIN — INSULIN HUMAN 2 UNITS: 100 INJECTION, SOLUTION PARENTERAL at 12:07

## 2022-09-23 RX ADMIN — AMOXICILLIN AND CLAVULANATE POTASSIUM 1 TABLET: 875; 125 TABLET, FILM COATED ORAL at 12:07

## 2022-09-23 RX ADMIN — FAMOTIDINE 20 MG: 20 TABLET ORAL at 10:50

## 2022-09-24 LAB
ANION GAP SERPL CALCULATED.3IONS-SCNC: 8 MMOL/L (ref 5–15)
BUN SERPL-MCNC: 29 MG/DL (ref 8–23)
BUN/CREAT SERPL: 19.5 (ref 7–25)
CALCIUM SPEC-SCNC: 9.4 MG/DL (ref 8.6–10.5)
CHLORIDE SERPL-SCNC: 102 MMOL/L (ref 98–107)
CO2 SERPL-SCNC: 30 MMOL/L (ref 22–29)
CREAT SERPL-MCNC: 1.49 MG/DL (ref 0.57–1)
DEPRECATED RDW RBC AUTO: 59.2 FL (ref 37–54)
EGFRCR SERPLBLD CKD-EPI 2021: 34.7 ML/MIN/1.73
ERYTHROCYTE [DISTWIDTH] IN BLOOD BY AUTOMATED COUNT: 17.9 % (ref 12.3–15.4)
GLUCOSE BLDC GLUCOMTR-MCNC: 186 MG/DL (ref 70–130)
GLUCOSE BLDC GLUCOMTR-MCNC: 205 MG/DL (ref 70–130)
GLUCOSE BLDC GLUCOMTR-MCNC: 216 MG/DL (ref 70–130)
GLUCOSE BLDC GLUCOMTR-MCNC: 238 MG/DL (ref 70–130)
GLUCOSE BLDC GLUCOMTR-MCNC: 53 MG/DL (ref 70–130)
GLUCOSE BLDC GLUCOMTR-MCNC: 59 MG/DL (ref 70–130)
GLUCOSE SERPL-MCNC: 71 MG/DL (ref 65–99)
HBA1C MFR BLD: 7.8 % (ref 4.8–5.6)
HCT VFR BLD AUTO: 40 % (ref 34–46.6)
HGB BLD-MCNC: 12.6 G/DL (ref 12–15.9)
MAGNESIUM SERPL-MCNC: 2.2 MG/DL (ref 1.6–2.4)
MCH RBC QN AUTO: 28.4 PG (ref 26.6–33)
MCHC RBC AUTO-ENTMCNC: 31.5 G/DL (ref 31.5–35.7)
MCV RBC AUTO: 90.1 FL (ref 79–97)
PHOSPHATE SERPL-MCNC: 3.7 MG/DL (ref 2.5–4.5)
PLATELET # BLD AUTO: 205 10*3/MM3 (ref 140–450)
PMV BLD AUTO: 10.5 FL (ref 6–12)
POTASSIUM SERPL-SCNC: 4.5 MMOL/L (ref 3.5–5.2)
RBC # BLD AUTO: 4.44 10*6/MM3 (ref 3.77–5.28)
SODIUM SERPL-SCNC: 140 MMOL/L (ref 136–145)
WBC NRBC COR # BLD: 6.01 10*3/MM3 (ref 3.4–10.8)

## 2022-09-24 PROCEDURE — 82962 GLUCOSE BLOOD TEST: CPT

## 2022-09-24 PROCEDURE — 25010000002 ONDANSETRON PER 1 MG: Performed by: INTERNAL MEDICINE

## 2022-09-24 PROCEDURE — 63710000001 INSULIN DETEMIR PER 5 UNITS: Performed by: INTERNAL MEDICINE

## 2022-09-24 PROCEDURE — 63710000001 INSULIN LISPRO (HUMAN) PER 5 UNITS: Performed by: NURSE PRACTITIONER

## 2022-09-24 PROCEDURE — 85027 COMPLETE CBC AUTOMATED: CPT

## 2022-09-24 PROCEDURE — 99232 SBSQ HOSP IP/OBS MODERATE 35: CPT | Performed by: INTERNAL MEDICINE

## 2022-09-24 PROCEDURE — 83036 HEMOGLOBIN GLYCOSYLATED A1C: CPT

## 2022-09-24 PROCEDURE — 83735 ASSAY OF MAGNESIUM: CPT

## 2022-09-24 PROCEDURE — 25010000002 HYALURONIDASE (HUMAN) 150 UNIT/ML SOLUTION 1 ML VIAL: Performed by: INTERNAL MEDICINE

## 2022-09-24 PROCEDURE — 80048 BASIC METABOLIC PNL TOTAL CA: CPT

## 2022-09-24 PROCEDURE — 84100 ASSAY OF PHOSPHORUS: CPT

## 2022-09-24 RX ORDER — BUMETANIDE 1 MG/1
0.5 TABLET ORAL DAILY
Status: DISCONTINUED | OUTPATIENT
Start: 2022-09-24 | End: 2022-09-30 | Stop reason: HOSPADM

## 2022-09-24 RX ADMIN — INSULIN DETEMIR 15 UNITS: 100 INJECTION, SOLUTION SUBCUTANEOUS at 21:39

## 2022-09-24 RX ADMIN — DEXTROSE MONOHYDRATE 25 G: 25 INJECTION, SOLUTION INTRAVENOUS at 07:27

## 2022-09-24 RX ADMIN — POLYETHYLENE GLYCOL 3350 17 G: 17 POWDER, FOR SOLUTION ORAL at 13:56

## 2022-09-24 RX ADMIN — AMOXICILLIN AND CLAVULANATE POTASSIUM 1 TABLET: 875; 125 TABLET, FILM COATED ORAL at 08:42

## 2022-09-24 RX ADMIN — SENNOSIDES AND DOCUSATE SODIUM 2 TABLET: 50; 8.6 TABLET ORAL at 08:42

## 2022-09-24 RX ADMIN — SENNOSIDES AND DOCUSATE SODIUM 2 TABLET: 50; 8.6 TABLET ORAL at 21:16

## 2022-09-24 RX ADMIN — ONDANSETRON 4 MG: 2 INJECTION INTRAMUSCULAR; INTRAVENOUS at 22:02

## 2022-09-24 RX ADMIN — APIXABAN 2.5 MG: 2.5 TABLET, FILM COATED ORAL at 21:16

## 2022-09-24 RX ADMIN — INSULIN LISPRO 4 UNITS: 100 INJECTION, SOLUTION INTRAVENOUS; SUBCUTANEOUS at 21:19

## 2022-09-24 RX ADMIN — ATORVASTATIN CALCIUM 40 MG: 40 TABLET, FILM COATED ORAL at 21:16

## 2022-09-24 RX ADMIN — FAMOTIDINE 20 MG: 20 TABLET ORAL at 17:30

## 2022-09-24 RX ADMIN — HYDRALAZINE HYDROCHLORIDE 25 MG: 25 TABLET ORAL at 21:16

## 2022-09-24 RX ADMIN — FAMOTIDINE 20 MG: 20 TABLET ORAL at 08:42

## 2022-09-24 RX ADMIN — BUMETANIDE 0.5 MG: 1 TABLET ORAL at 12:09

## 2022-09-24 RX ADMIN — AMOXICILLIN AND CLAVULANATE POTASSIUM 1 TABLET: 875; 125 TABLET, FILM COATED ORAL at 21:16

## 2022-09-24 RX ADMIN — HYDRALAZINE HYDROCHLORIDE 25 MG: 25 TABLET ORAL at 06:32

## 2022-09-24 RX ADMIN — HYDRALAZINE HYDROCHLORIDE 25 MG: 25 TABLET ORAL at 13:56

## 2022-09-24 RX ADMIN — ASPIRIN 81 MG: 81 TABLET, COATED ORAL at 08:42

## 2022-09-24 RX ADMIN — APIXABAN 2.5 MG: 2.5 TABLET, FILM COATED ORAL at 08:42

## 2022-09-24 RX ADMIN — HYALURONIDASE (HUMAN RECOMBINANT) 150 UNITS: 150 INJECTION, SOLUTION SUBCUTANEOUS at 09:01

## 2022-09-24 RX ADMIN — INSULIN LISPRO 4 UNITS: 100 INJECTION, SOLUTION INTRAVENOUS; SUBCUTANEOUS at 17:30

## 2022-09-25 LAB
ACANTHOCYTES BLD QL SMEAR: ABNORMAL
ALBUMIN SERPL-MCNC: 2.9 G/DL (ref 3.5–5.2)
ALBUMIN/GLOB SERPL: 0.9 G/DL
ALP SERPL-CCNC: 192 U/L (ref 39–117)
ALT SERPL W P-5'-P-CCNC: 16 U/L (ref 1–33)
ANION GAP SERPL CALCULATED.3IONS-SCNC: 10 MMOL/L (ref 5–15)
AST SERPL-CCNC: 28 U/L (ref 1–32)
BASOPHILS # BLD MANUAL: 0 10*3/MM3 (ref 0–0.2)
BASOPHILS NFR BLD MANUAL: 0 % (ref 0–1.5)
BILIRUB SERPL-MCNC: 0.6 MG/DL (ref 0–1.2)
BUN SERPL-MCNC: 25 MG/DL (ref 8–23)
BUN/CREAT SERPL: 18.8 (ref 7–25)
CALCIUM SPEC-SCNC: 8.8 MG/DL (ref 8.6–10.5)
CHLORIDE SERPL-SCNC: 100 MMOL/L (ref 98–107)
CO2 SERPL-SCNC: 28 MMOL/L (ref 22–29)
CREAT SERPL-MCNC: 1.33 MG/DL (ref 0.57–1)
DACRYOCYTES BLD QL SMEAR: ABNORMAL
DEPRECATED RDW RBC AUTO: 59.4 FL (ref 37–54)
EGFRCR SERPLBLD CKD-EPI 2021: 39.8 ML/MIN/1.73
EOSINOPHIL # BLD MANUAL: 0.17 10*3/MM3 (ref 0–0.4)
EOSINOPHIL NFR BLD MANUAL: 3 % (ref 0.3–6.2)
ERYTHROCYTE [DISTWIDTH] IN BLOOD BY AUTOMATED COUNT: 18.1 % (ref 12.3–15.4)
GLOBULIN UR ELPH-MCNC: 3.3 GM/DL
GLUCOSE BLDC GLUCOMTR-MCNC: 139 MG/DL (ref 70–130)
GLUCOSE BLDC GLUCOMTR-MCNC: 147 MG/DL (ref 70–130)
GLUCOSE BLDC GLUCOMTR-MCNC: 151 MG/DL (ref 70–130)
GLUCOSE BLDC GLUCOMTR-MCNC: 230 MG/DL (ref 70–130)
GLUCOSE SERPL-MCNC: 128 MG/DL (ref 65–99)
HCT VFR BLD AUTO: 36.1 % (ref 34–46.6)
HGB BLD-MCNC: 11.8 G/DL (ref 12–15.9)
LYMPHOCYTES # BLD MANUAL: 1.44 10*3/MM3 (ref 0.7–3.1)
LYMPHOCYTES NFR BLD MANUAL: 1 % (ref 5–12)
MAGNESIUM SERPL-MCNC: 2 MG/DL (ref 1.6–2.4)
MCH RBC QN AUTO: 28.8 PG (ref 26.6–33)
MCHC RBC AUTO-ENTMCNC: 32.7 G/DL (ref 31.5–35.7)
MCV RBC AUTO: 88 FL (ref 79–97)
MONOCYTES # BLD: 0.06 10*3/MM3 (ref 0.1–0.9)
NEUTROPHILS # BLD AUTO: 4.1 10*3/MM3 (ref 1.7–7)
NEUTROPHILS NFR BLD MANUAL: 69 % (ref 42.7–76)
NEUTS BAND NFR BLD MANUAL: 2 % (ref 0–5)
OVALOCYTES BLD QL SMEAR: ABNORMAL
PHOSPHATE SERPL-MCNC: 3.8 MG/DL (ref 2.5–4.5)
PLAT MORPH BLD: NORMAL
PLATELET # BLD AUTO: 194 10*3/MM3 (ref 140–450)
PMV BLD AUTO: 11.1 FL (ref 6–12)
POTASSIUM SERPL-SCNC: 4.6 MMOL/L (ref 3.5–5.2)
PROT SERPL-MCNC: 6.2 G/DL (ref 6–8.5)
RBC # BLD AUTO: 4.1 10*6/MM3 (ref 3.77–5.28)
SODIUM SERPL-SCNC: 138 MMOL/L (ref 136–145)
VARIANT LYMPHS NFR BLD MANUAL: 25 % (ref 19.6–45.3)
WBC MORPH BLD: NORMAL
WBC NRBC COR # BLD: 5.77 10*3/MM3 (ref 3.4–10.8)

## 2022-09-25 PROCEDURE — 84100 ASSAY OF PHOSPHORUS: CPT | Performed by: INTERNAL MEDICINE

## 2022-09-25 PROCEDURE — 80053 COMPREHEN METABOLIC PANEL: CPT | Performed by: INTERNAL MEDICINE

## 2022-09-25 PROCEDURE — 85007 BL SMEAR W/DIFF WBC COUNT: CPT | Performed by: INTERNAL MEDICINE

## 2022-09-25 PROCEDURE — 83735 ASSAY OF MAGNESIUM: CPT | Performed by: INTERNAL MEDICINE

## 2022-09-25 PROCEDURE — 82962 GLUCOSE BLOOD TEST: CPT

## 2022-09-25 PROCEDURE — 63710000001 INSULIN LISPRO (HUMAN) PER 5 UNITS: Performed by: NURSE PRACTITIONER

## 2022-09-25 PROCEDURE — 85025 COMPLETE CBC W/AUTO DIFF WBC: CPT | Performed by: INTERNAL MEDICINE

## 2022-09-25 PROCEDURE — 99233 SBSQ HOSP IP/OBS HIGH 50: CPT | Performed by: FAMILY MEDICINE

## 2022-09-25 PROCEDURE — 63710000001 INSULIN DETEMIR PER 5 UNITS: Performed by: INTERNAL MEDICINE

## 2022-09-25 PROCEDURE — 99232 SBSQ HOSP IP/OBS MODERATE 35: CPT | Performed by: PHYSICIAN ASSISTANT

## 2022-09-25 RX ADMIN — ATORVASTATIN CALCIUM 40 MG: 40 TABLET, FILM COATED ORAL at 20:05

## 2022-09-25 RX ADMIN — INSULIN DETEMIR 15 UNITS: 100 INJECTION, SOLUTION SUBCUTANEOUS at 20:20

## 2022-09-25 RX ADMIN — BUMETANIDE 0.5 MG: 1 TABLET ORAL at 13:43

## 2022-09-25 RX ADMIN — INSULIN DETEMIR 15 UNITS: 100 INJECTION, SOLUTION SUBCUTANEOUS at 10:19

## 2022-09-25 RX ADMIN — HYDRALAZINE HYDROCHLORIDE 25 MG: 25 TABLET ORAL at 20:05

## 2022-09-25 RX ADMIN — SENNOSIDES AND DOCUSATE SODIUM 2 TABLET: 50; 8.6 TABLET ORAL at 13:43

## 2022-09-25 RX ADMIN — INSULIN LISPRO 2 UNITS: 100 INJECTION, SOLUTION INTRAVENOUS; SUBCUTANEOUS at 10:18

## 2022-09-25 RX ADMIN — DIAZEPAM 5 MG: 5 TABLET ORAL at 01:02

## 2022-09-25 RX ADMIN — APIXABAN 2.5 MG: 2.5 TABLET, FILM COATED ORAL at 13:43

## 2022-09-25 RX ADMIN — AMOXICILLIN AND CLAVULANATE POTASSIUM 1 TABLET: 875; 125 TABLET, FILM COATED ORAL at 13:43

## 2022-09-25 RX ADMIN — HYDRALAZINE HYDROCHLORIDE 25 MG: 25 TABLET ORAL at 13:43

## 2022-09-25 RX ADMIN — APIXABAN 2.5 MG: 2.5 TABLET, FILM COATED ORAL at 20:06

## 2022-09-25 RX ADMIN — SENNOSIDES AND DOCUSATE SODIUM 2 TABLET: 50; 8.6 TABLET ORAL at 20:05

## 2022-09-25 RX ADMIN — AMOXICILLIN AND CLAVULANATE POTASSIUM 1 TABLET: 875; 125 TABLET, FILM COATED ORAL at 20:05

## 2022-09-25 RX ADMIN — FAMOTIDINE 20 MG: 20 TABLET ORAL at 05:36

## 2022-09-25 RX ADMIN — HYDRALAZINE HYDROCHLORIDE 25 MG: 25 TABLET ORAL at 05:36

## 2022-09-25 RX ADMIN — FAMOTIDINE 20 MG: 20 TABLET ORAL at 17:30

## 2022-09-25 RX ADMIN — ACETAMINOPHEN 650 MG: 325 TABLET, FILM COATED ORAL at 07:45

## 2022-09-25 RX ADMIN — ACETAMINOPHEN 650 MG: 325 TABLET, FILM COATED ORAL at 20:06

## 2022-09-25 RX ADMIN — INSULIN LISPRO 4 UNITS: 100 INJECTION, SOLUTION INTRAVENOUS; SUBCUTANEOUS at 20:20

## 2022-09-25 RX ADMIN — ASPIRIN 81 MG: 81 TABLET, COATED ORAL at 13:43

## 2022-09-25 RX ADMIN — ACETAMINOPHEN 650 MG: 325 TABLET, FILM COATED ORAL at 01:02

## 2022-09-25 RX ADMIN — BISACODYL 10 MG: 10 SUPPOSITORY RECTAL at 05:36

## 2022-09-26 LAB
ANION GAP SERPL CALCULATED.3IONS-SCNC: 9 MMOL/L (ref 5–15)
BACTERIA SPEC AEROBE CULT: NORMAL
BACTERIA SPEC AEROBE CULT: NORMAL
BH CV LEA LEFT ANT TIBIAL A DISTAL EDV: 4.5 CM/S
BH CV LEA LEFT ANT TIBIAL A DISTAL PSV: 12.1 CM/S
BH CV LEA LEFT ANT TIBIAL A MID EDV: 8.4 CM/S
BH CV LEA LEFT ANT TIBIAL A MID PSV: 37.7 CM/S
BH CV LEA LEFT ANT TIBIAL A PROX EDV: 9.3 CM/S
BH CV LEA LEFT ANT TIBIAL A PROX PSV: 45 CM/S
BH CV LEA LEFT CFA PROX PSV: 46 CM/S
BH CV LEA LEFT DFA PROX PSV: 43 CM/S
BH CV LEA LEFT PERONEAL  MID PSV: 15 CM/S
BH CV LEA LEFT POPITEAL A  DISTAL EDV: 7 CM/S
BH CV LEA LEFT POPITEAL A  DISTAL PSV: 33.5 CM/S
BH CV LEA LEFT POPITEAL A  PROX EDV: 5.6 CM/S
BH CV LEA LEFT POPITEAL A  PROX PSV: 36 CM/S
BH CV LEA LEFT SFA DISTAL EDV: 3.2 CM/S
BH CV LEA LEFT SFA DISTAL PSV: 22.9 CM/S
BH CV LEA LEFT SFA MID EDV: 4.9 CM/S
BH CV LEA LEFT SFA MID PSV: 43.4 CM/S
BH CV LEA LEFT SFA PROX PSV: 34 CM/S
BH CV LEA RIGHT ANT TIBIAL A DISTAL PSV: 74.5 CM/S
BH CV LEA RIGHT ANT TIBIAL A MID PSV: 52.2 CM/S
BH CV LEA RIGHT ANT TIBIAL A PROX PSV: 68 CM/S
BH CV LEA RIGHT CFA PROX PSV: 56 CM/S
BH CV LEA RIGHT DFA PROX PSV: 59 CM/S
BH CV LEA RIGHT PERONEAL  MID PSV: 46.2 CM/S
BH CV LEA RIGHT POPITEAL A  DISTAL PSV: 66 CM/S
BH CV LEA RIGHT POPITEAL A  PROX PSV: 109 CM/S
BH CV LEA RIGHT PTA MID PSV: 35 CM/S
BH CV LEA RIGHT PTA PROX PSV: 47 CM/S
BH CV LEA RIGHT SFA DISTAL PSV: 97.7 CM/S
BH CV LEA RIGHT SFA MID PSV: 75.7 CM/S
BH CV LEA RIGHT SFA PROX PSV: 48 CM/S
BUN SERPL-MCNC: 28 MG/DL (ref 8–23)
BUN/CREAT SERPL: 20.4 (ref 7–25)
CALCIUM SPEC-SCNC: 8.5 MG/DL (ref 8.6–10.5)
CHLORIDE SERPL-SCNC: 102 MMOL/L (ref 98–107)
CO2 SERPL-SCNC: 28 MMOL/L (ref 22–29)
CREAT SERPL-MCNC: 1.37 MG/DL (ref 0.57–1)
EGFRCR SERPLBLD CKD-EPI 2021: 38.4 ML/MIN/1.73
GLUCOSE BLDC GLUCOMTR-MCNC: 141 MG/DL (ref 70–130)
GLUCOSE BLDC GLUCOMTR-MCNC: 167 MG/DL (ref 70–130)
GLUCOSE BLDC GLUCOMTR-MCNC: 169 MG/DL (ref 70–130)
GLUCOSE BLDC GLUCOMTR-MCNC: 229 MG/DL (ref 70–130)
GLUCOSE BLDC GLUCOMTR-MCNC: 47 MG/DL (ref 70–130)
GLUCOSE BLDC GLUCOMTR-MCNC: 53 MG/DL (ref 70–130)
GLUCOSE SERPL-MCNC: 58 MG/DL (ref 65–99)
LEFT GROIN CFA SYS: 46.3 CM/SEC
MAGNESIUM SERPL-MCNC: 1.9 MG/DL (ref 1.6–2.4)
MAXIMAL PREDICTED HEART RATE: 137 BPM
POTASSIUM SERPL-SCNC: 4.4 MMOL/L (ref 3.5–5.2)
RIGHT GROIN CFA SYS: 55.7 CM/SEC
SODIUM SERPL-SCNC: 139 MMOL/L (ref 136–145)
STRESS TARGET HR: 116 BPM

## 2022-09-26 PROCEDURE — 99232 SBSQ HOSP IP/OBS MODERATE 35: CPT

## 2022-09-26 PROCEDURE — 97535 SELF CARE MNGMENT TRAINING: CPT

## 2022-09-26 PROCEDURE — 80048 BASIC METABOLIC PNL TOTAL CA: CPT | Performed by: NURSE PRACTITIONER

## 2022-09-26 PROCEDURE — 83735 ASSAY OF MAGNESIUM: CPT | Performed by: NURSE PRACTITIONER

## 2022-09-26 PROCEDURE — 63710000001 INSULIN DETEMIR PER 5 UNITS: Performed by: FAMILY MEDICINE

## 2022-09-26 PROCEDURE — 82962 GLUCOSE BLOOD TEST: CPT

## 2022-09-26 PROCEDURE — 97110 THERAPEUTIC EXERCISES: CPT

## 2022-09-26 PROCEDURE — 99232 SBSQ HOSP IP/OBS MODERATE 35: CPT | Performed by: FAMILY MEDICINE

## 2022-09-26 PROCEDURE — 63710000001 INSULIN LISPRO (HUMAN) PER 5 UNITS: Performed by: NURSE PRACTITIONER

## 2022-09-26 PROCEDURE — 25010000002 ONDANSETRON PER 1 MG: Performed by: INTERNAL MEDICINE

## 2022-09-26 RX ORDER — MINOCYCLINE HYDROCHLORIDE 50 MG/1
100 CAPSULE ORAL EVERY 12 HOURS SCHEDULED
Status: DISCONTINUED | OUTPATIENT
Start: 2022-09-26 | End: 2022-09-30 | Stop reason: HOSPADM

## 2022-09-26 RX ORDER — DOXYCYCLINE 100 MG/1
100 CAPSULE ORAL EVERY 12 HOURS SCHEDULED
Status: DISCONTINUED | OUTPATIENT
Start: 2022-09-26 | End: 2022-09-26

## 2022-09-26 RX ORDER — L.ACID,PARA/B.BIFIDUM/S.THERM 8B CELL
1 CAPSULE ORAL DAILY
Status: DISCONTINUED | OUTPATIENT
Start: 2022-09-26 | End: 2022-09-30 | Stop reason: HOSPADM

## 2022-09-26 RX ADMIN — ATORVASTATIN CALCIUM 40 MG: 40 TABLET, FILM COATED ORAL at 21:11

## 2022-09-26 RX ADMIN — DOXYCYCLINE 100 MG: 100 CAPSULE ORAL at 12:29

## 2022-09-26 RX ADMIN — INSULIN LISPRO 2 UNITS: 100 INJECTION, SOLUTION INTRAVENOUS; SUBCUTANEOUS at 18:10

## 2022-09-26 RX ADMIN — ASPIRIN 81 MG: 81 TABLET, COATED ORAL at 09:09

## 2022-09-26 RX ADMIN — FAMOTIDINE 20 MG: 20 TABLET ORAL at 18:09

## 2022-09-26 RX ADMIN — DEXTROSE MONOHYDRATE 12.5 G: 25 INJECTION, SOLUTION INTRAVENOUS at 07:28

## 2022-09-26 RX ADMIN — MINOCYCLINE HYDROCHLORIDE 100 MG: 50 CAPSULE ORAL at 18:09

## 2022-09-26 RX ADMIN — HYDRALAZINE HYDROCHLORIDE 25 MG: 25 TABLET ORAL at 12:29

## 2022-09-26 RX ADMIN — ONDANSETRON 4 MG: 2 INJECTION INTRAMUSCULAR; INTRAVENOUS at 10:35

## 2022-09-26 RX ADMIN — INSULIN LISPRO 2 UNITS: 100 INJECTION, SOLUTION INTRAVENOUS; SUBCUTANEOUS at 12:29

## 2022-09-26 RX ADMIN — INSULIN DETEMIR 5 UNITS: 100 INJECTION, SOLUTION SUBCUTANEOUS at 21:14

## 2022-09-26 RX ADMIN — AMOXICILLIN AND CLAVULANATE POTASSIUM 1 TABLET: 875; 125 TABLET, FILM COATED ORAL at 09:09

## 2022-09-26 RX ADMIN — HYDRALAZINE HYDROCHLORIDE 25 MG: 25 TABLET ORAL at 21:11

## 2022-09-26 RX ADMIN — Medication 1 CAPSULE: at 12:29

## 2022-09-26 RX ADMIN — INSULIN LISPRO 4 UNITS: 100 INJECTION, SOLUTION INTRAVENOUS; SUBCUTANEOUS at 21:14

## 2022-09-26 RX ADMIN — APIXABAN 2.5 MG: 2.5 TABLET, FILM COATED ORAL at 09:09

## 2022-09-26 RX ADMIN — SENNOSIDES AND DOCUSATE SODIUM 2 TABLET: 50; 8.6 TABLET ORAL at 09:09

## 2022-09-26 RX ADMIN — SENNOSIDES AND DOCUSATE SODIUM 2 TABLET: 50; 8.6 TABLET ORAL at 21:11

## 2022-09-26 RX ADMIN — APIXABAN 2.5 MG: 2.5 TABLET, FILM COATED ORAL at 21:11

## 2022-09-26 RX ADMIN — BUMETANIDE 0.5 MG: 1 TABLET ORAL at 09:09

## 2022-09-26 RX ADMIN — AMOXICILLIN AND CLAVULANATE POTASSIUM 1 TABLET: 875; 125 TABLET, FILM COATED ORAL at 21:11

## 2022-09-27 ENCOUNTER — APPOINTMENT (OUTPATIENT)
Dept: MRI IMAGING | Facility: HOSPITAL | Age: 82
End: 2022-09-27

## 2022-09-27 LAB
ABO GROUP BLD: NORMAL
BLD GP AB SCN SERPL QL: NEGATIVE
GLUCOSE BLDC GLUCOMTR-MCNC: 175 MG/DL (ref 70–130)
GLUCOSE BLDC GLUCOMTR-MCNC: 233 MG/DL (ref 70–130)
GLUCOSE BLDC GLUCOMTR-MCNC: 320 MG/DL (ref 70–130)
GLUCOSE BLDC GLUCOMTR-MCNC: 375 MG/DL (ref 70–130)
RH BLD: POSITIVE
SARS-COV-2 RDRP RESP QL NAA+PROBE: NORMAL
T&S EXPIRATION DATE: NORMAL

## 2022-09-27 PROCEDURE — 63710000001 INSULIN DETEMIR PER 5 UNITS: Performed by: FAMILY MEDICINE

## 2022-09-27 PROCEDURE — 86850 RBC ANTIBODY SCREEN: CPT

## 2022-09-27 PROCEDURE — 87635 SARS-COV-2 COVID-19 AMP PRB: CPT | Performed by: FAMILY MEDICINE

## 2022-09-27 PROCEDURE — 86923 COMPATIBILITY TEST ELECTRIC: CPT

## 2022-09-27 PROCEDURE — 86900 BLOOD TYPING SEROLOGIC ABO: CPT

## 2022-09-27 PROCEDURE — 86901 BLOOD TYPING SEROLOGIC RH(D): CPT

## 2022-09-27 PROCEDURE — 82962 GLUCOSE BLOOD TEST: CPT

## 2022-09-27 PROCEDURE — 99232 SBSQ HOSP IP/OBS MODERATE 35: CPT | Performed by: FAMILY MEDICINE

## 2022-09-27 PROCEDURE — 73718 MRI LOWER EXTREMITY W/O DYE: CPT

## 2022-09-27 PROCEDURE — 63710000001 INSULIN LISPRO (HUMAN) PER 5 UNITS: Performed by: NURSE PRACTITIONER

## 2022-09-27 RX ORDER — CYCLOBENZAPRINE HCL 10 MG
5 TABLET ORAL 3 TIMES DAILY PRN
Status: DISCONTINUED | OUTPATIENT
Start: 2022-09-27 | End: 2022-09-30 | Stop reason: HOSPADM

## 2022-09-27 RX ORDER — AMOXICILLIN AND CLAVULANATE POTASSIUM 875; 125 MG/1; MG/1
1 TABLET, FILM COATED ORAL EVERY 24 HOURS
Status: DISCONTINUED | OUTPATIENT
Start: 2022-09-28 | End: 2022-09-30 | Stop reason: HOSPADM

## 2022-09-27 RX ORDER — CEFAZOLIN SODIUM 2 G/100ML
2 INJECTION, SOLUTION INTRAVENOUS ONCE
Status: CANCELLED | OUTPATIENT
Start: 2022-09-27

## 2022-09-27 RX ORDER — DIAZEPAM 5 MG/1
5 TABLET ORAL
Status: COMPLETED | OUTPATIENT
Start: 2022-09-27 | End: 2022-09-27

## 2022-09-27 RX ADMIN — ACETAMINOPHEN 650 MG: 325 TABLET, FILM COATED ORAL at 15:18

## 2022-09-27 RX ADMIN — INSULIN LISPRO 8 UNITS: 100 INJECTION, SOLUTION INTRAVENOUS; SUBCUTANEOUS at 12:06

## 2022-09-27 RX ADMIN — ASPIRIN 81 MG: 81 TABLET, COATED ORAL at 08:51

## 2022-09-27 RX ADMIN — INSULIN LISPRO 4 UNITS: 100 INJECTION, SOLUTION INTRAVENOUS; SUBCUTANEOUS at 17:50

## 2022-09-27 RX ADMIN — INSULIN LISPRO 7 UNITS: 100 INJECTION, SOLUTION INTRAVENOUS; SUBCUTANEOUS at 21:58

## 2022-09-27 RX ADMIN — ATORVASTATIN CALCIUM 40 MG: 40 TABLET, FILM COATED ORAL at 21:57

## 2022-09-27 RX ADMIN — DIAZEPAM 5 MG: 5 TABLET ORAL at 16:45

## 2022-09-27 RX ADMIN — MINOCYCLINE HYDROCHLORIDE 100 MG: 50 CAPSULE ORAL at 22:10

## 2022-09-27 RX ADMIN — INSULIN DETEMIR 5 UNITS: 100 INJECTION, SOLUTION SUBCUTANEOUS at 21:57

## 2022-09-27 RX ADMIN — CYCLOBENZAPRINE 5 MG: 10 TABLET, FILM COATED ORAL at 15:46

## 2022-09-27 RX ADMIN — MINOCYCLINE HYDROCHLORIDE 100 MG: 50 CAPSULE ORAL at 08:50

## 2022-09-27 RX ADMIN — FAMOTIDINE 20 MG: 20 TABLET ORAL at 08:51

## 2022-09-27 RX ADMIN — INSULIN DETEMIR 5 UNITS: 100 INJECTION, SOLUTION SUBCUTANEOUS at 09:30

## 2022-09-27 RX ADMIN — FAMOTIDINE 20 MG: 20 TABLET ORAL at 17:50

## 2022-09-27 RX ADMIN — Medication 1 CAPSULE: at 08:50

## 2022-09-27 RX ADMIN — HYDRALAZINE HYDROCHLORIDE 25 MG: 25 TABLET ORAL at 21:57

## 2022-09-27 RX ADMIN — APIXABAN 2.5 MG: 2.5 TABLET, FILM COATED ORAL at 21:57

## 2022-09-27 RX ADMIN — HYDRALAZINE HYDROCHLORIDE 25 MG: 25 TABLET ORAL at 14:15

## 2022-09-27 RX ADMIN — INSULIN LISPRO 2 UNITS: 100 INJECTION, SOLUTION INTRAVENOUS; SUBCUTANEOUS at 08:51

## 2022-09-27 RX ADMIN — SENNOSIDES AND DOCUSATE SODIUM 2 TABLET: 50; 8.6 TABLET ORAL at 08:50

## 2022-09-27 RX ADMIN — SENNOSIDES AND DOCUSATE SODIUM 2 TABLET: 50; 8.6 TABLET ORAL at 21:57

## 2022-09-27 RX ADMIN — BUMETANIDE 0.5 MG: 1 TABLET ORAL at 08:51

## 2022-09-27 RX ADMIN — APIXABAN 2.5 MG: 2.5 TABLET, FILM COATED ORAL at 08:51

## 2022-09-27 RX ADMIN — AMOXICILLIN AND CLAVULANATE POTASSIUM 1 TABLET: 875; 125 TABLET, FILM COATED ORAL at 08:50

## 2022-09-28 ENCOUNTER — ANESTHESIA (OUTPATIENT)
Dept: PERIOP | Facility: HOSPITAL | Age: 82
End: 2022-09-28

## 2022-09-28 ENCOUNTER — APPOINTMENT (OUTPATIENT)
Dept: GENERAL RADIOLOGY | Facility: HOSPITAL | Age: 82
End: 2022-09-28

## 2022-09-28 ENCOUNTER — ANESTHESIA EVENT (OUTPATIENT)
Dept: PERIOP | Facility: HOSPITAL | Age: 82
End: 2022-09-28

## 2022-09-28 LAB
GLUCOSE BLDC GLUCOMTR-MCNC: 154 MG/DL (ref 70–130)
GLUCOSE BLDC GLUCOMTR-MCNC: 160 MG/DL (ref 70–130)
GLUCOSE BLDC GLUCOMTR-MCNC: 192 MG/DL (ref 70–130)
GLUCOSE BLDC GLUCOMTR-MCNC: 215 MG/DL (ref 70–130)

## 2022-09-28 PROCEDURE — C1887 CATHETER, GUIDING: HCPCS | Performed by: THORACIC SURGERY (CARDIOTHORACIC VASCULAR SURGERY)

## 2022-09-28 PROCEDURE — C2623 CATH, TRANSLUMIN, DRUG-COAT: HCPCS | Performed by: THORACIC SURGERY (CARDIOTHORACIC VASCULAR SURGERY)

## 2022-09-28 PROCEDURE — 99232 SBSQ HOSP IP/OBS MODERATE 35: CPT | Performed by: INTERNAL MEDICINE

## 2022-09-28 PROCEDURE — 25010000002 LORAZEPAM PER 2 MG: Performed by: NURSE PRACTITIONER

## 2022-09-28 PROCEDURE — 25010000002 IOPAMIDOL 61 % SOLUTION: Performed by: THORACIC SURGERY (CARDIOTHORACIC VASCULAR SURGERY)

## 2022-09-28 PROCEDURE — 25010000002 PHENYLEPHRINE 10 MG/ML SOLUTION 1 ML VIAL: Performed by: NURSE ANESTHETIST, CERTIFIED REGISTERED

## 2022-09-28 PROCEDURE — 25010000002 ONDANSETRON PER 1 MG: Performed by: PHYSICIAN ASSISTANT

## 2022-09-28 PROCEDURE — 37226 PR REVSC OPN/PRQ FEM/POP W/STNT/ANGIOP SM VSL: CPT | Performed by: THORACIC SURGERY (CARDIOTHORACIC VASCULAR SURGERY)

## 2022-09-28 PROCEDURE — 85347 COAGULATION TIME ACTIVATED: CPT

## 2022-09-28 PROCEDURE — 99231 SBSQ HOSP IP/OBS SF/LOW 25: CPT | Performed by: THORACIC SURGERY (CARDIOTHORACIC VASCULAR SURGERY)

## 2022-09-28 PROCEDURE — C1894 INTRO/SHEATH, NON-LASER: HCPCS | Performed by: THORACIC SURGERY (CARDIOTHORACIC VASCULAR SURGERY)

## 2022-09-28 PROCEDURE — 63710000001 INSULIN LISPRO (HUMAN) PER 5 UNITS

## 2022-09-28 PROCEDURE — C1769 GUIDE WIRE: HCPCS | Performed by: THORACIC SURGERY (CARDIOTHORACIC VASCULAR SURGERY)

## 2022-09-28 PROCEDURE — 75710 ARTERY X-RAYS ARM/LEG: CPT

## 2022-09-28 PROCEDURE — C1725 CATH, TRANSLUMIN NON-LASER: HCPCS | Performed by: THORACIC SURGERY (CARDIOTHORACIC VASCULAR SURGERY)

## 2022-09-28 PROCEDURE — 25010000002 FENTANYL CITRATE (PF) 50 MCG/ML SOLUTION: Performed by: ANESTHESIOLOGY

## 2022-09-28 PROCEDURE — 75625 CONTRAST EXAM ABDOMINL AORTA: CPT | Performed by: THORACIC SURGERY (CARDIOTHORACIC VASCULAR SURGERY)

## 2022-09-28 PROCEDURE — 25010000002 HEPARIN (PORCINE) PER 1000 UNITS: Performed by: ANESTHESIOLOGY

## 2022-09-28 PROCEDURE — 25010000002 HEPARIN (PORCINE) PER 1000 UNITS: Performed by: THORACIC SURGERY (CARDIOTHORACIC VASCULAR SURGERY)

## 2022-09-28 PROCEDURE — 99232 SBSQ HOSP IP/OBS MODERATE 35: CPT | Performed by: FAMILY MEDICINE

## 2022-09-28 PROCEDURE — 047L3DZ DILATION OF LEFT FEMORAL ARTERY WITH INTRALUMINAL DEVICE, PERCUTANEOUS APPROACH: ICD-10-PCS | Performed by: THORACIC SURGERY (CARDIOTHORACIC VASCULAR SURGERY)

## 2022-09-28 PROCEDURE — 75625 CONTRAST EXAM ABDOMINL AORTA: CPT

## 2022-09-28 PROCEDURE — C1876 STENT, NON-COA/NON-COV W/DEL: HCPCS | Performed by: THORACIC SURGERY (CARDIOTHORACIC VASCULAR SURGERY)

## 2022-09-28 PROCEDURE — 63710000001 INSULIN DETEMIR PER 5 UNITS: Performed by: FAMILY MEDICINE

## 2022-09-28 PROCEDURE — B41D1ZZ FLUOROSCOPY OF AORTA AND BILATERAL LOWER EXTREMITY ARTERIES USING LOW OSMOLAR CONTRAST: ICD-10-PCS | Performed by: THORACIC SURGERY (CARDIOTHORACIC VASCULAR SURGERY)

## 2022-09-28 PROCEDURE — 25010000002 PROPOFOL 10 MG/ML EMULSION: Performed by: ANESTHESIOLOGY

## 2022-09-28 PROCEDURE — 25010000002 ONDANSETRON PER 1 MG: Performed by: INTERNAL MEDICINE

## 2022-09-28 PROCEDURE — 82962 GLUCOSE BLOOD TEST: CPT

## 2022-09-28 PROCEDURE — 75710 ARTERY X-RAYS ARM/LEG: CPT | Performed by: THORACIC SURGERY (CARDIOTHORACIC VASCULAR SURGERY)

## 2022-09-28 PROCEDURE — 25010000002 CEFAZOLIN IN DEXTROSE 2-4 GM/100ML-% SOLUTION: Performed by: INTERNAL MEDICINE

## 2022-09-28 DEVICE — STENT PRB35-06-040-120 PROTEGE EF V07
Type: IMPLANTABLE DEVICE | Site: ARTERY FEMORAL | Status: FUNCTIONAL
Brand: EVERFLEX™ PROTÉGÉ™ EVERFLEX™

## 2022-09-28 RX ORDER — SODIUM CHLORIDE 450 MG/100ML
35 INJECTION, SOLUTION INTRAVENOUS CONTINUOUS
Status: DISCONTINUED | OUTPATIENT
Start: 2022-09-28 | End: 2022-09-30 | Stop reason: HOSPADM

## 2022-09-28 RX ORDER — LIDOCAINE HYDROCHLORIDE 10 MG/ML
INJECTION, SOLUTION EPIDURAL; INFILTRATION; INTRACAUDAL; PERINEURAL AS NEEDED
Status: DISCONTINUED | OUTPATIENT
Start: 2022-09-28 | End: 2022-09-28 | Stop reason: SURG

## 2022-09-28 RX ORDER — FENTANYL CITRATE 50 UG/ML
INJECTION, SOLUTION INTRAMUSCULAR; INTRAVENOUS AS NEEDED
Status: DISCONTINUED | OUTPATIENT
Start: 2022-09-28 | End: 2022-09-28 | Stop reason: SURG

## 2022-09-28 RX ORDER — LORAZEPAM 2 MG/ML
0.25 INJECTION INTRAMUSCULAR ONCE
Status: COMPLETED | OUTPATIENT
Start: 2022-09-28 | End: 2022-09-28

## 2022-09-28 RX ORDER — SODIUM CHLORIDE 9 MG/ML
INJECTION, SOLUTION INTRAVENOUS CONTINUOUS PRN
Status: DISCONTINUED | OUTPATIENT
Start: 2022-09-28 | End: 2022-09-28 | Stop reason: SURG

## 2022-09-28 RX ORDER — HYDROMORPHONE HYDROCHLORIDE 1 MG/ML
0.5 INJECTION, SOLUTION INTRAMUSCULAR; INTRAVENOUS; SUBCUTANEOUS
Status: DISCONTINUED | OUTPATIENT
Start: 2022-09-28 | End: 2022-09-28 | Stop reason: HOSPADM

## 2022-09-28 RX ORDER — INSULIN LISPRO 100 [IU]/ML
2 INJECTION, SOLUTION INTRAVENOUS; SUBCUTANEOUS ONCE
Status: COMPLETED | OUTPATIENT
Start: 2022-09-28 | End: 2022-09-28

## 2022-09-28 RX ORDER — INSULIN LISPRO 100 [IU]/ML
0-7 INJECTION, SOLUTION INTRAVENOUS; SUBCUTANEOUS
Status: CANCELLED | OUTPATIENT
Start: 2022-09-28

## 2022-09-28 RX ORDER — ROCURONIUM BROMIDE 10 MG/ML
INJECTION, SOLUTION INTRAVENOUS AS NEEDED
Status: DISCONTINUED | OUTPATIENT
Start: 2022-09-28 | End: 2022-09-28 | Stop reason: SURG

## 2022-09-28 RX ORDER — PROPOFOL 10 MG/ML
VIAL (ML) INTRAVENOUS AS NEEDED
Status: DISCONTINUED | OUTPATIENT
Start: 2022-09-28 | End: 2022-09-28 | Stop reason: SURG

## 2022-09-28 RX ORDER — BUPIVACAINE HCL/0.9 % NACL/PF 0.125 %
PLASTIC BAG, INJECTION (ML) EPIDURAL AS NEEDED
Status: DISCONTINUED | OUTPATIENT
Start: 2022-09-28 | End: 2022-09-28 | Stop reason: SURG

## 2022-09-28 RX ORDER — HEPARIN SODIUM 1000 [USP'U]/ML
INJECTION, SOLUTION INTRAVENOUS; SUBCUTANEOUS AS NEEDED
Status: DISCONTINUED | OUTPATIENT
Start: 2022-09-28 | End: 2022-09-28 | Stop reason: SURG

## 2022-09-28 RX ORDER — INSULIN LISPRO 100 [IU]/ML
INJECTION, SOLUTION INTRAVENOUS; SUBCUTANEOUS
Status: COMPLETED
Start: 2022-09-28 | End: 2022-09-28

## 2022-09-28 RX ORDER — FENTANYL CITRATE 50 UG/ML
50 INJECTION, SOLUTION INTRAMUSCULAR; INTRAVENOUS
Status: DISCONTINUED | OUTPATIENT
Start: 2022-09-28 | End: 2022-09-28 | Stop reason: HOSPADM

## 2022-09-28 RX ORDER — INSULIN LISPRO 100 [IU]/ML
5 INJECTION, SOLUTION INTRAVENOUS; SUBCUTANEOUS
Status: DISCONTINUED | OUTPATIENT
Start: 2022-09-28 | End: 2022-09-29

## 2022-09-28 RX ORDER — CEFAZOLIN SODIUM 2 G/100ML
2 INJECTION, SOLUTION INTRAVENOUS ONCE
Status: COMPLETED | OUTPATIENT
Start: 2022-09-28 | End: 2022-09-28

## 2022-09-28 RX ADMIN — Medication 200 MCG: at 16:39

## 2022-09-28 RX ADMIN — HYDRALAZINE HYDROCHLORIDE 25 MG: 25 TABLET ORAL at 23:10

## 2022-09-28 RX ADMIN — AMOXICILLIN AND CLAVULANATE POTASSIUM 1 TABLET: 875; 125 TABLET, FILM COATED ORAL at 09:32

## 2022-09-28 RX ADMIN — MINOCYCLINE HYDROCHLORIDE 100 MG: 50 CAPSULE ORAL at 23:11

## 2022-09-28 RX ADMIN — INSULIN LISPRO 2 UNITS: 100 INJECTION, SOLUTION INTRAVENOUS; SUBCUTANEOUS at 17:48

## 2022-09-28 RX ADMIN — BUMETANIDE 0.5 MG: 1 TABLET ORAL at 09:32

## 2022-09-28 RX ADMIN — Medication 100 MCG: at 16:11

## 2022-09-28 RX ADMIN — CEFAZOLIN SODIUM 2 G: 2 INJECTION, SOLUTION INTRAVENOUS at 15:52

## 2022-09-28 RX ADMIN — HYDRALAZINE HYDROCHLORIDE 25 MG: 25 TABLET ORAL at 09:32

## 2022-09-28 RX ADMIN — Medication 100 MCG: at 16:12

## 2022-09-28 RX ADMIN — Medication 200 MCG: at 16:18

## 2022-09-28 RX ADMIN — INSULIN DETEMIR 5 UNITS: 100 INJECTION, SOLUTION SUBCUTANEOUS at 09:36

## 2022-09-28 RX ADMIN — FENTANYL CITRATE 100 MCG: 50 INJECTION, SOLUTION INTRAMUSCULAR; INTRAVENOUS at 16:05

## 2022-09-28 RX ADMIN — ONDANSETRON 4 MG: 2 INJECTION INTRAMUSCULAR; INTRAVENOUS at 20:42

## 2022-09-28 RX ADMIN — PROPOFOL 25 MCG/KG/MIN: 10 INJECTION, EMULSION INTRAVENOUS at 15:55

## 2022-09-28 RX ADMIN — FAMOTIDINE 20 MG: 20 TABLET ORAL at 09:32

## 2022-09-28 RX ADMIN — Medication 200 MCG: at 16:30

## 2022-09-28 RX ADMIN — HEPARIN SODIUM 5000 UNITS: 1000 INJECTION, SOLUTION INTRAVENOUS; SUBCUTANEOUS at 16:17

## 2022-09-28 RX ADMIN — SODIUM CHLORIDE: 9 INJECTION, SOLUTION INTRAVENOUS at 15:48

## 2022-09-28 RX ADMIN — ROCURONIUM BROMIDE 20 MG: 10 INJECTION INTRAVENOUS at 16:05

## 2022-09-28 RX ADMIN — ASPIRIN 81 MG: 81 TABLET, COATED ORAL at 09:32

## 2022-09-28 RX ADMIN — LORAZEPAM 0.25 MG: 2 INJECTION INTRAMUSCULAR; INTRAVENOUS at 21:37

## 2022-09-28 RX ADMIN — MINOCYCLINE HYDROCHLORIDE 100 MG: 50 CAPSULE ORAL at 09:36

## 2022-09-28 RX ADMIN — ONDANSETRON 4 MG: 2 INJECTION INTRAMUSCULAR; INTRAVENOUS at 17:00

## 2022-09-28 RX ADMIN — PHENYLEPHRINE HYDROCHLORIDE 1 MCG/KG/MIN: 10 INJECTION INTRAVENOUS at 16:40

## 2022-09-28 RX ADMIN — LIDOCAINE HYDROCHLORIDE 50 MG: 10 INJECTION, SOLUTION EPIDURAL; INFILTRATION; INTRACAUDAL; PERINEURAL at 16:05

## 2022-09-28 RX ADMIN — SUGAMMADEX 200 MG: 100 INJECTION, SOLUTION INTRAVENOUS at 17:05

## 2022-09-28 RX ADMIN — PROPOFOL 80 MG: 10 INJECTION, EMULSION INTRAVENOUS at 16:05

## 2022-09-28 NOTE — ANESTHESIA PROCEDURE NOTES
Airway  Urgency: elective    Date/Time: 9/28/2022 4:06 PM  Airway not difficult    General Information and Staff    Patient location during procedure: OR  SRNA: Haile Vernon SRNA  Indications and Patient Condition  Indications for airway management: airway protection    Preoxygenated: yes  MILS maintained throughout  Mask difficulty assessment: 1 - vent by mask    Final Airway Details  Final airway type: endotracheal airway      Successful airway: ETT  Cuffed: yes   Successful intubation technique: direct laryngoscopy  Endotracheal tube insertion site: oral  Blade: Solitario  Blade size: 2  ETT size (mm): 7.0  Cormack-Lehane Classification: grade I - full view of glottis  Placement verified by: chest auscultation and capnometry   Cuff volume (mL): 5  Measured from: lips  ETT/EBT  to lips (cm): 20  Number of attempts at approach: 1  Assessment: lips, teeth, and gum same as pre-op and atraumatic intubation    Additional Comments  Pt to OR 1. Pt was moved to OR table with roller and OR staff assistance. ASA monitors placed. Pre-O2 with 100% Oxygen. SIVI. Atraumatic intubation. +ETCO2, +BBS.

## 2022-09-28 NOTE — ANESTHESIA PREPROCEDURE EVALUATION
Anesthesia Evaluation     Patient summary reviewed and Nursing notes reviewed                Airway   Dental      Pulmonary    (+) asthma,  Cardiovascular     ECG reviewed  PT is on anticoagulation therapy    (+) hypertension, valvular problems/murmurs MR, CAD (Nonobstructive CAD by cardiac cath June 21, 2021.), dysrhythmias (on Eliquis) Atrial Fib, CHF Diastolic >=55%, PVD, hyperlipidemia,     ROS comment: ECHO 8/2022:  Interpretation Summary    · Estimated left ventricular EF = 55%  · Mild to moderate mitral valve regurgitation is present.        Neuro/Psych  (-) CVA  GI/Hepatic/Renal/Endo    (+)   renal disease CRI, diabetes mellitus poorly controlled,     Musculoskeletal     Abdominal    Substance History      OB/GYN          Other   arthritis,                      Anesthesia Plan    ASA 3     general     intravenous induction     Anesthetic plan, risks, benefits, and alternatives have been provided, discussed and informed consent has been obtained with: patient.    Use of blood products discussed with patient .   Plan discussed with CRNA.        CODE STATUS:    While under anesthesia and immediate perioperative period:  Code Status: CPR - Full Support       oriented to person, place, time and situation

## 2022-09-28 NOTE — ANESTHESIA POSTPROCEDURE EVALUATION
Patient: Susan Anderson    Procedure Summary     Date: 09/28/22 Room / Location: [facility] OR 01 / [facility] HYBRID BREANA    Anesthesia Start: 1548 Anesthesia Stop:     Procedure: CO2 ANGIOGRAM, LEFT SFA ANGIOPLASTY WITH DRUG ELUTING BALLOON, LEFT SFA STENT PLACEMENT  (N/A ) Diagnosis:     Surgeons: Trey Forrest MD Provider: Giovanni Salmeron Jr., MD    Anesthesia Type: general ASA Status: 3          Anesthesia Type: general    Vitals  Vitals Value Taken Time   /72 09/28/22 1730   Temp 97 °F (36.1 °C) 09/28/22 1730   Pulse 76 09/28/22 1730   Resp 12 09/28/22 1730   SpO2 99 % 09/28/22 1730           Post Anesthesia Care and Evaluation    Patient location during evaluation: PACU  Patient participation: complete - patient cannot participate  Level of consciousness: responsive to physical stimuli  Pain score: 0  Pain management: adequate    Airway patency: patent  Anesthetic complications: No anesthetic complications    Cardiovascular status: stable  Respiratory status: acceptable, nasal cannula, oral airway and spontaneous ventilation  Hydration status: stable    Comments: Pt transferred to PACU with O2. Vital signs stable. Report to PACU RN and care accepted.

## 2022-09-28 NOTE — ADDENDUM NOTE
Addendum  created 09/28/22 173 by Wanda Gerber CRNA    Order list changed, Order sets accessed, Pharmacy for encounter modified

## 2022-09-29 LAB
ACANTHOCYTES BLD QL SMEAR: ABNORMAL
ANION GAP SERPL CALCULATED.3IONS-SCNC: 14 MMOL/L (ref 5–15)
ANISOCYTOSIS BLD QL: ABNORMAL
BASOPHILS # BLD MANUAL: 0 10*3/MM3 (ref 0–0.2)
BASOPHILS NFR BLD MANUAL: 0 % (ref 0–1.5)
BH BB BLOOD EXPIRATION DATE: NORMAL
BH BB BLOOD TYPE BARCODE: 6200
BH BB DISPENSE STATUS: NORMAL
BH BB PRODUCT CODE: NORMAL
BH BB UNIT NUMBER: NORMAL
BUN SERPL-MCNC: 34 MG/DL (ref 8–23)
BUN/CREAT SERPL: 21.8 (ref 7–25)
BURR CELLS BLD QL SMEAR: ABNORMAL
CALCIUM SPEC-SCNC: 8.4 MG/DL (ref 8.6–10.5)
CHLORIDE SERPL-SCNC: 99 MMOL/L (ref 98–107)
CO2 SERPL-SCNC: 24 MMOL/L (ref 22–29)
CREAT SERPL-MCNC: 1.56 MG/DL (ref 0.57–1)
CROSSMATCH INTERPRETATION: NORMAL
DEPRECATED RDW RBC AUTO: 60.8 FL (ref 37–54)
EGFRCR SERPLBLD CKD-EPI 2021: 32.9 ML/MIN/1.73
EOSINOPHIL # BLD MANUAL: 0.07 10*3/MM3 (ref 0–0.4)
EOSINOPHIL NFR BLD MANUAL: 1 % (ref 0.3–6.2)
ERYTHROCYTE [DISTWIDTH] IN BLOOD BY AUTOMATED COUNT: 18.3 % (ref 12.3–15.4)
GLUCOSE BLDC GLUCOMTR-MCNC: 111 MG/DL (ref 70–130)
GLUCOSE BLDC GLUCOMTR-MCNC: 129 MG/DL (ref 70–130)
GLUCOSE BLDC GLUCOMTR-MCNC: 200 MG/DL (ref 70–130)
GLUCOSE BLDC GLUCOMTR-MCNC: 223 MG/DL (ref 70–130)
GLUCOSE BLDC GLUCOMTR-MCNC: 50 MG/DL (ref 70–130)
GLUCOSE BLDC GLUCOMTR-MCNC: 67 MG/DL (ref 70–130)
GLUCOSE SERPL-MCNC: 213 MG/DL (ref 65–99)
HCT VFR BLD AUTO: 35.6 % (ref 34–46.6)
HGB BLD-MCNC: 11.2 G/DL (ref 12–15.9)
LYMPHOCYTES # BLD MANUAL: 1.13 10*3/MM3 (ref 0.7–3.1)
LYMPHOCYTES NFR BLD MANUAL: 2 % (ref 5–12)
MCH RBC QN AUTO: 28.2 PG (ref 26.6–33)
MCHC RBC AUTO-ENTMCNC: 31.5 G/DL (ref 31.5–35.7)
MCV RBC AUTO: 89.7 FL (ref 79–97)
MONOCYTES # BLD: 0.13 10*3/MM3 (ref 0.1–0.9)
NEUTROPHILS # BLD AUTO: 5.33 10*3/MM3 (ref 1.7–7)
NEUTROPHILS NFR BLD MANUAL: 71 % (ref 42.7–76)
NEUTS BAND NFR BLD MANUAL: 9 % (ref 0–5)
OVALOCYTES BLD QL SMEAR: ABNORMAL
PLAT MORPH BLD: NORMAL
PLATELET # BLD AUTO: 220 10*3/MM3 (ref 140–450)
PMV BLD AUTO: 11.7 FL (ref 6–12)
POTASSIUM SERPL-SCNC: 4.9 MMOL/L (ref 3.5–5.2)
RBC # BLD AUTO: 3.97 10*6/MM3 (ref 3.77–5.28)
SODIUM SERPL-SCNC: 137 MMOL/L (ref 136–145)
UNIT  ABO: NORMAL
UNIT  RH: NORMAL
VARIANT LYMPHS NFR BLD MANUAL: 14 % (ref 19.6–45.3)
VARIANT LYMPHS NFR BLD MANUAL: 3 % (ref 0–5)
WBC MORPH BLD: NORMAL
WBC NRBC COR # BLD: 6.66 10*3/MM3 (ref 3.4–10.8)

## 2022-09-29 PROCEDURE — 97110 THERAPEUTIC EXERCISES: CPT

## 2022-09-29 PROCEDURE — 82962 GLUCOSE BLOOD TEST: CPT

## 2022-09-29 PROCEDURE — 25010000002 ONDANSETRON PER 1 MG: Performed by: PHYSICIAN ASSISTANT

## 2022-09-29 PROCEDURE — 99231 SBSQ HOSP IP/OBS SF/LOW 25: CPT | Performed by: THORACIC SURGERY (CARDIOTHORACIC VASCULAR SURGERY)

## 2022-09-29 PROCEDURE — 85007 BL SMEAR W/DIFF WBC COUNT: CPT | Performed by: PHYSICIAN ASSISTANT

## 2022-09-29 PROCEDURE — 97530 THERAPEUTIC ACTIVITIES: CPT

## 2022-09-29 PROCEDURE — 85025 COMPLETE CBC W/AUTO DIFF WBC: CPT | Performed by: PHYSICIAN ASSISTANT

## 2022-09-29 PROCEDURE — 99232 SBSQ HOSP IP/OBS MODERATE 35: CPT | Performed by: INTERNAL MEDICINE

## 2022-09-29 PROCEDURE — 63710000001 INSULIN LISPRO (HUMAN) PER 5 UNITS: Performed by: PHYSICIAN ASSISTANT

## 2022-09-29 PROCEDURE — 80048 BASIC METABOLIC PNL TOTAL CA: CPT | Performed by: PHYSICIAN ASSISTANT

## 2022-09-29 PROCEDURE — 97168 OT RE-EVAL EST PLAN CARE: CPT

## 2022-09-29 PROCEDURE — 63710000001 INSULIN DETEMIR PER 5 UNITS: Performed by: INTERNAL MEDICINE

## 2022-09-29 PROCEDURE — 63710000001 INSULIN DETEMIR PER 5 UNITS: Performed by: PHYSICIAN ASSISTANT

## 2022-09-29 PROCEDURE — 97164 PT RE-EVAL EST PLAN CARE: CPT

## 2022-09-29 RX ORDER — INSULIN LISPRO 100 [IU]/ML
3 INJECTION, SOLUTION INTRAVENOUS; SUBCUTANEOUS
Status: DISCONTINUED | OUTPATIENT
Start: 2022-09-30 | End: 2022-09-30 | Stop reason: HOSPADM

## 2022-09-29 RX ORDER — CLOPIDOGREL BISULFATE 75 MG/1
75 TABLET ORAL DAILY
Status: DISCONTINUED | OUTPATIENT
Start: 2022-09-30 | End: 2022-09-30 | Stop reason: HOSPADM

## 2022-09-29 RX ADMIN — FAMOTIDINE 20 MG: 20 TABLET ORAL at 17:32

## 2022-09-29 RX ADMIN — HYDRALAZINE HYDROCHLORIDE 25 MG: 25 TABLET ORAL at 06:10

## 2022-09-29 RX ADMIN — BUMETANIDE 0.5 MG: 1 TABLET ORAL at 08:41

## 2022-09-29 RX ADMIN — Medication 15 G: at 17:32

## 2022-09-29 RX ADMIN — DIAZEPAM 5 MG: 5 TABLET ORAL at 06:10

## 2022-09-29 RX ADMIN — SENNOSIDES AND DOCUSATE SODIUM 2 TABLET: 50; 8.6 TABLET ORAL at 20:31

## 2022-09-29 RX ADMIN — ATORVASTATIN CALCIUM 40 MG: 40 TABLET, FILM COATED ORAL at 20:35

## 2022-09-29 RX ADMIN — APIXABAN 2.5 MG: 2.5 TABLET, FILM COATED ORAL at 20:34

## 2022-09-29 RX ADMIN — MINOCYCLINE HYDROCHLORIDE 100 MG: 50 CAPSULE ORAL at 20:33

## 2022-09-29 RX ADMIN — ACETAMINOPHEN 650 MG: 325 TABLET, FILM COATED ORAL at 00:33

## 2022-09-29 RX ADMIN — CYCLOBENZAPRINE 5 MG: 10 TABLET, FILM COATED ORAL at 20:32

## 2022-09-29 RX ADMIN — FAMOTIDINE 20 MG: 20 TABLET ORAL at 06:10

## 2022-09-29 RX ADMIN — INSULIN LISPRO 5 UNITS: 100 INJECTION, SOLUTION INTRAVENOUS; SUBCUTANEOUS at 08:41

## 2022-09-29 RX ADMIN — INSULIN LISPRO 4 UNITS: 100 INJECTION, SOLUTION INTRAVENOUS; SUBCUTANEOUS at 08:43

## 2022-09-29 RX ADMIN — ONDANSETRON 4 MG: 2 INJECTION INTRAMUSCULAR; INTRAVENOUS at 06:10

## 2022-09-29 RX ADMIN — APIXABAN 2.5 MG: 2.5 TABLET, FILM COATED ORAL at 08:42

## 2022-09-29 RX ADMIN — HYDRALAZINE HYDROCHLORIDE 25 MG: 25 TABLET ORAL at 12:59

## 2022-09-29 RX ADMIN — Medication 1 CAPSULE: at 08:42

## 2022-09-29 RX ADMIN — INSULIN DETEMIR 10 UNITS: 100 INJECTION, SOLUTION SUBCUTANEOUS at 08:41

## 2022-09-29 RX ADMIN — AMOXICILLIN AND CLAVULANATE POTASSIUM 1 TABLET: 875; 125 TABLET, FILM COATED ORAL at 08:41

## 2022-09-29 RX ADMIN — MINOCYCLINE HYDROCHLORIDE 100 MG: 50 CAPSULE ORAL at 12:59

## 2022-09-29 RX ADMIN — INSULIN LISPRO 5 UNITS: 100 INJECTION, SOLUTION INTRAVENOUS; SUBCUTANEOUS at 13:00

## 2022-09-29 RX ADMIN — HYDRALAZINE HYDROCHLORIDE 25 MG: 25 TABLET ORAL at 20:48

## 2022-09-29 RX ADMIN — ASPIRIN 81 MG: 81 TABLET, COATED ORAL at 08:42

## 2022-09-29 RX ADMIN — METOPROLOL TARTRATE 12.5 MG: 25 TABLET, FILM COATED ORAL at 20:51

## 2022-09-29 RX ADMIN — CYCLOBENZAPRINE 5 MG: 10 TABLET, FILM COATED ORAL at 01:59

## 2022-09-29 RX ADMIN — INSULIN DETEMIR 5 UNITS: 100 INJECTION, SOLUTION SUBCUTANEOUS at 20:38

## 2022-09-29 RX ADMIN — DIAZEPAM 5 MG: 5 TABLET ORAL at 20:35

## 2022-09-29 RX ADMIN — INSULIN LISPRO 4 UNITS: 100 INJECTION, SOLUTION INTRAVENOUS; SUBCUTANEOUS at 13:00

## 2022-09-30 ENCOUNTER — READMISSION MANAGEMENT (OUTPATIENT)
Dept: CALL CENTER | Facility: HOSPITAL | Age: 82
End: 2022-09-30

## 2022-09-30 VITALS
RESPIRATION RATE: 19 BRPM | TEMPERATURE: 97.9 F | SYSTOLIC BLOOD PRESSURE: 130 MMHG | OXYGEN SATURATION: 79 % | DIASTOLIC BLOOD PRESSURE: 80 MMHG | BODY MASS INDEX: 28.23 KG/M2 | HEART RATE: 77 BPM | WEIGHT: 165.34 LBS | HEIGHT: 64 IN

## 2022-09-30 PROBLEM — N18.30 CKD (CHRONIC KIDNEY DISEASE), STAGE III: Status: RESOLVED | Noted: 2021-04-08 | Resolved: 2022-09-30

## 2022-09-30 PROBLEM — E87.5 HYPERKALEMIA: Status: RESOLVED | Noted: 2021-04-15 | Resolved: 2022-09-30

## 2022-09-30 LAB
ANION GAP SERPL CALCULATED.3IONS-SCNC: 8 MMOL/L (ref 5–15)
BUN SERPL-MCNC: 39 MG/DL (ref 8–23)
BUN/CREAT SERPL: 32.8 (ref 7–25)
CALCIUM SPEC-SCNC: 8.5 MG/DL (ref 8.6–10.5)
CHLORIDE SERPL-SCNC: 100 MMOL/L (ref 98–107)
CO2 SERPL-SCNC: 27 MMOL/L (ref 22–29)
CREAT SERPL-MCNC: 1.19 MG/DL (ref 0.57–1)
DEPRECATED RDW RBC AUTO: 59.2 FL (ref 37–54)
EGFRCR SERPLBLD CKD-EPI 2021: 45.5 ML/MIN/1.73
ERYTHROCYTE [DISTWIDTH] IN BLOOD BY AUTOMATED COUNT: 18.1 % (ref 12.3–15.4)
GLUCOSE BLDC GLUCOMTR-MCNC: 153 MG/DL (ref 70–130)
GLUCOSE BLDC GLUCOMTR-MCNC: 166 MG/DL (ref 70–130)
GLUCOSE BLDC GLUCOMTR-MCNC: 258 MG/DL (ref 70–130)
GLUCOSE SERPL-MCNC: 251 MG/DL (ref 65–99)
HCT VFR BLD AUTO: 29.2 % (ref 34–46.6)
HGB BLD-MCNC: 9.5 G/DL (ref 12–15.9)
MCH RBC QN AUTO: 29.1 PG (ref 26.6–33)
MCHC RBC AUTO-ENTMCNC: 32.5 G/DL (ref 31.5–35.7)
MCV RBC AUTO: 89.6 FL (ref 79–97)
PLATELET # BLD AUTO: 177 10*3/MM3 (ref 140–450)
PMV BLD AUTO: 11.2 FL (ref 6–12)
POTASSIUM SERPL-SCNC: 4.8 MMOL/L (ref 3.5–5.2)
RBC # BLD AUTO: 3.26 10*6/MM3 (ref 3.77–5.28)
SODIUM SERPL-SCNC: 135 MMOL/L (ref 136–145)
WBC NRBC COR # BLD: 6.87 10*3/MM3 (ref 3.4–10.8)

## 2022-09-30 PROCEDURE — 99239 HOSP IP/OBS DSCHRG MGMT >30: CPT | Performed by: NURSE PRACTITIONER

## 2022-09-30 PROCEDURE — 85027 COMPLETE CBC AUTOMATED: CPT | Performed by: INTERNAL MEDICINE

## 2022-09-30 PROCEDURE — 97164 PT RE-EVAL EST PLAN CARE: CPT

## 2022-09-30 PROCEDURE — 63710000001 INSULIN DETEMIR PER 5 UNITS: Performed by: INTERNAL MEDICINE

## 2022-09-30 PROCEDURE — 82962 GLUCOSE BLOOD TEST: CPT

## 2022-09-30 PROCEDURE — 80048 BASIC METABOLIC PNL TOTAL CA: CPT | Performed by: INTERNAL MEDICINE

## 2022-09-30 PROCEDURE — 99231 SBSQ HOSP IP/OBS SF/LOW 25: CPT | Performed by: THORACIC SURGERY (CARDIOTHORACIC VASCULAR SURGERY)

## 2022-09-30 PROCEDURE — 97597 DBRDMT OPN WND 1ST 20 CM/<: CPT

## 2022-09-30 RX ORDER — HYDRALAZINE HYDROCHLORIDE 25 MG/1
25 TABLET, FILM COATED ORAL EVERY 8 HOURS SCHEDULED
Qty: 90 TABLET | Refills: 0 | Status: SHIPPED | OUTPATIENT
Start: 2022-09-30 | End: 2022-10-28 | Stop reason: SDUPTHER

## 2022-09-30 RX ORDER — INSULIN GLARGINE 100 [IU]/ML
15 INJECTION, SOLUTION SUBCUTANEOUS NIGHTLY
Start: 2022-09-30 | End: 2023-02-16 | Stop reason: SDUPTHER

## 2022-09-30 RX ORDER — CLOPIDOGREL BISULFATE 75 MG/1
75 TABLET ORAL DAILY
Qty: 30 TABLET | Refills: 0 | Status: SHIPPED | OUTPATIENT
Start: 2022-10-01 | End: 2022-10-20 | Stop reason: SDUPTHER

## 2022-09-30 RX ORDER — AMOXICILLIN AND CLAVULANATE POTASSIUM 875; 125 MG/1; MG/1
1 TABLET, FILM COATED ORAL EVERY 24 HOURS
Qty: 27 TABLET | Refills: 0 | Status: SHIPPED | OUTPATIENT
Start: 2022-10-01 | End: 2022-10-14

## 2022-09-30 RX ORDER — MINOCYCLINE HYDROCHLORIDE 100 MG/1
100 CAPSULE ORAL EVERY 12 HOURS SCHEDULED
Qty: 12 CAPSULE | Refills: 0 | Status: SHIPPED | OUTPATIENT
Start: 2022-09-30 | End: 2022-10-06

## 2022-09-30 RX ORDER — FAMOTIDINE 20 MG/1
20 TABLET, FILM COATED ORAL
Qty: 60 TABLET | Refills: 0 | Status: SHIPPED | OUTPATIENT
Start: 2022-09-30 | End: 2022-10-12 | Stop reason: HOSPADM

## 2022-09-30 RX ORDER — BUSPIRONE HYDROCHLORIDE 10 MG/1
5 TABLET ORAL EVERY 12 HOURS SCHEDULED
Status: DISCONTINUED | OUTPATIENT
Start: 2022-09-30 | End: 2022-09-30

## 2022-09-30 RX ORDER — BUSPIRONE HYDROCHLORIDE 10 MG/1
5 TABLET ORAL 2 TIMES DAILY PRN
Qty: 30 TABLET | Refills: 0 | Status: SHIPPED | OUTPATIENT
Start: 2022-09-30 | End: 2022-10-04

## 2022-09-30 RX ORDER — BUMETANIDE 0.5 MG/1
0.5 TABLET ORAL DAILY
Qty: 30 TABLET | Refills: 0 | Status: SHIPPED | OUTPATIENT
Start: 2022-10-01 | End: 2022-10-28 | Stop reason: SDUPTHER

## 2022-09-30 RX ORDER — ACETAMINOPHEN 325 MG/1
650 TABLET ORAL EVERY 6 HOURS PRN
Start: 2022-09-30

## 2022-09-30 RX ORDER — MINOCYCLINE HYDROCHLORIDE 100 MG/1
100 CAPSULE ORAL EVERY 12 HOURS SCHEDULED
Qty: 6 CAPSULE | Refills: 0 | Status: SHIPPED | OUTPATIENT
Start: 2022-09-30 | End: 2022-09-30 | Stop reason: SDUPTHER

## 2022-09-30 RX ORDER — ATORVASTATIN CALCIUM 40 MG/1
40 TABLET, FILM COATED ORAL DAILY
Qty: 90 TABLET | Refills: 0 | Status: SHIPPED | OUTPATIENT
Start: 2022-09-30 | End: 2022-10-28 | Stop reason: SDUPTHER

## 2022-09-30 RX ORDER — L.ACID,PARA/B.BIFIDUM/S.THERM 8B CELL
1 CAPSULE ORAL DAILY
Qty: 30 CAPSULE | Refills: 0 | Status: ON HOLD | OUTPATIENT
Start: 2022-10-01 | End: 2023-01-03

## 2022-09-30 RX ADMIN — HYDRALAZINE HYDROCHLORIDE 25 MG: 25 TABLET ORAL at 05:22

## 2022-09-30 RX ADMIN — Medication 1 CAPSULE: at 09:18

## 2022-09-30 RX ADMIN — AMOXICILLIN AND CLAVULANATE POTASSIUM 1 TABLET: 875; 125 TABLET, FILM COATED ORAL at 09:18

## 2022-09-30 RX ADMIN — APIXABAN 2.5 MG: 2.5 TABLET, FILM COATED ORAL at 09:18

## 2022-09-30 RX ADMIN — MINOCYCLINE HYDROCHLORIDE 100 MG: 50 CAPSULE ORAL at 09:18

## 2022-09-30 RX ADMIN — ASPIRIN 81 MG: 81 TABLET, COATED ORAL at 09:18

## 2022-09-30 RX ADMIN — CLOPIDOGREL BISULFATE 75 MG: 75 TABLET ORAL at 09:21

## 2022-09-30 RX ADMIN — BUMETANIDE 0.5 MG: 1 TABLET ORAL at 09:18

## 2022-09-30 RX ADMIN — FAMOTIDINE 20 MG: 20 TABLET ORAL at 09:18

## 2022-09-30 RX ADMIN — METOPROLOL TARTRATE 12.5 MG: 25 TABLET, FILM COATED ORAL at 09:18

## 2022-09-30 RX ADMIN — INSULIN DETEMIR 5 UNITS: 100 INJECTION, SOLUTION SUBCUTANEOUS at 09:18

## 2022-09-30 RX ADMIN — HYDRALAZINE HYDROCHLORIDE 25 MG: 25 TABLET ORAL at 15:08

## 2022-09-30 NOTE — OUTREACH NOTE
Prep Survey    Flowsheet Row Responses   [facility] facility patient discharged from? [facility]   Is LACE score < 7 ? No   Emergency Room discharge w/ pulse ox? No   Eligibility [facility]   Date of Admission 09/21/22   Date of Discharge 09/30/22   Discharge Disposition Home or Self Care   Discharge diagnosis N/V, LACEY on CKD, hyperkalemia, anemia, DM, A-fib, chronic CHF   Does the patient have one of the following disease processes/diagnoses(primary or secondary)? Other   Does the patient have Home health ordered? No   Is there a DME ordered? No   Prep survey completed? Yes          LO MCKEON - Registered Nurse

## 2022-10-01 NOTE — PROGRESS NOTES
INFECTIOUS DISEASE Progress Note    Susan Anderson  1940  [id number]    Admission Date: 9/21/2022      Requesting Provider: No ref. provider found  Evaluating Physician: Stef Pete MD    Reason for Consultation: Left foot ulcer/wound infection and chronic right foot osteomyelitis    History of present illness:    9/26/22: Patient is a 82 y.o. female with type 2 diabetes mellitus, systolic/diastolic congestive heart failure, stage II-3 chronic kidney disease, chronic right leg lymphedema, coronary artery disease, gout, asthma, peripheral vascular disease, MSSA right hallux osteomyelitis requiring transmetatarsal amputation, cervical cancer, and a recent left dorsal foot ulcer with cellulitis who was admitted again on 9/21 with nausea, vomiting, and acute kidney injury with severe hyperkalemia.  Her creatinine on 9/21 was 1.88 and her potassium was 7.2.  Her creatinine subsequently david to 1.95 on the evening of 9/21.  Her renal dysfunction and hyperkalemia was thought to be secondary to diuresis combined with spironolactone and potassium replacements.  She has been on prolonged oral Augmentin for residual MSSA foot infection after the infection extended over into the second metatarsal phalangeal region.  This wound has gradually improved and is now healed but she remains at high risk for persistent infection/relapse.  She has tolerated the Augmentin well with no nausea and vomiting in the past.  Earlier this month she developed a distal left dorsal foot ulcer and was placed on doxycycline along with silver dressing therapy at her nursing facility.  Photos were sent of the wound by the time I saw her a few days later on 9/13, she was significantly better with decreased surrounding cellulitis.  I had her continue the doxycycline for an additional week.  She is currently unable to provide a reliable history regarding the circumstances of her admission.  She is not sure about the date that she was admitted.   She has remained afebrile.  Her creatinine today is down to 1.37 and her potassium is 4.4.  She was started back on doxycycline therapy today.    9/27/22: She has remained afebrile. Her creatinine yesterday was 1.37.  She complains of some left foot pain.  She denies nausea, vomiting, and diarrhea.  She thinks that she is tolerating the minocycline well.    9/28/22: She remains afebrile.  She denies nausea, vomiting, and diarrhea. She denies increased left foot pain.  She is scheduled for vascular assessment/vascular intervention today.  Later this afternoon she underwent a CO2 angiogram with left SFA angioplasty/stent placement.     9/29/22:.  She underwent successful left SFA vascular intervention yesterday.  She denies increased foot pain.  She has remained afebrile.  She has some anorexia but denies vomiting. Reading today is 1.56.  Her white blood cell count is 6.7.  Hemoglobin is 11.2.    9/30/22: She denies nausea and vomiting.  She was able to eat her breakfast.  She denies increased left foot pain.  She denies increased dyspnea. Her left foot MRI scan was negative for osteomyelitis.    Past Medical History:   Diagnosis Date   • Arthritis    • Asthma 6/4 - double pneumonia    Currently on inhaler and nebulizer   • Atrial fibrillation (HCC) 8/21/2022   • Cancer (HCC)     cervical cancer, skin cancer   • CHF (congestive heart failure) (HCC) June 4, 2021   • Chronic kidney disease Related to diabetes   • Coronary artery disease 6/4/2021 DX for hear failure   • Diabetes mellitus (HCC) 30 years    Seeing Dr. Lam 1st time Aug 19   • Gout    • Hx of colonoscopy    • Hyperlipidemia Reference current labs x 2-3yrs approx   • Hypertension 30 years   • Migraine    • Mitral valve disease    • Mitral valve disease    • Osteomyelitis (HCC)    • Peripheral neuropathy    • Sleep apnea    • Type 2 diabetes mellitus (HCC)     30 years       Past Surgical History:   Procedure Laterality Date   • ABDOMINAL HYSTERECTOMY  W/SALPINGECTOMY     • AMPUTATION  Right great toe, 1st 1/3 metatarsal -[place] 4/14/21   • AORTAGRAM N/A 4/9/2021    Procedure: AORTAGRAM WITH OR WITHOUT RUNOFFS, WITH Co2;  Surgeon: Trey Forrest MD;  Location:  [facility];  Service: Vascular;  Laterality: N/A;   • AORTAGRAM N/A 9/28/2022    Procedure: CO2 ANGIOGRAM, LEFT SFA ANGIOPLASTY WITH DRUG ELUTING BALLOON, LEFT SFA STENT PLACEMENT ;  Surgeon: Trey Forrest MD;  Location:  [facility];  Service: Vascular;  Laterality: N/A;  FLUORO: 13.12  DOSE 162mGy  CONTRAST: Isovue 350: 15ml   • APPENDECTOMY     • BRAIN TUMOR EXCISION      laser surgery    • CARDIAC CATHETERIZATION N/A 6/21/2021    Procedure: LEFT HEART CATH;  Surgeon: Anjum Ceballos MD;  Location: [facility] CATH INVASIVE LOCATION;  Service: Cardiology;  Laterality: N/A;   • CATARACT EXTRACTION W/ INTRAOCULAR LENS  IMPLANT, BILATERAL     • CHOLECYSTECTOMY     • EYE SURGERY     • HYSTERECTOMY     • TOE SURGERY     • TRANS METATARSAL AMPUTATION Right 4/14/2021    Procedure: AMPUTATION TRANS METATARSAL RIGHT GREAT TOE;  Surgeon: Valdez Finney MD;  Location: [facility] OR;  Service: Vascular;  Laterality: Right;       Family History   Problem Relation Age of Onset   • Diabetes Mother         Type II   • Heart disease Father    • Heart attack Father    • Coronary artery disease Father    • Diabetes Father         Type II   • Diabetes Sister    • Diabetes Maternal Grandmother    • Diabetes Maternal Grandfather    • Diabetes Paternal Grandmother    • Diabetes Paternal Grandfather        Social History     Socioeconomic History   • Marital status:    • Number of children: 1   Tobacco Use   • Smoking status: Never Smoker   • Smokeless tobacco: Never Used   Vaping Use   • Vaping Use: Never used   Substance and Sexual Activity   • Alcohol use: Yes     Comment: Social drinking when not recovering from hospitalization   • Drug use: Never   • Sexual activity: Not Currently     Birth control/protection:  None       Allergies   Allergen Reactions   • Baclofen Other (See Comments) and Hallucinations     PSYCHOSIS-POA REFUSES ADMINISTRATION OF THIS MED.   • Cephalexin Nausea Only   • Erythromycin Base Nausea Only   • Melatonin Other (See Comments)     Nightmares   • Oxycodone Nausea Only   • Sulfa Antibiotics Nausea Only         Medication:  No current facility-administered medications for this encounter.    Current Outpatient Medications:   •  acetaminophen (TYLENOL) 325 MG tablet, Take 2 tablets by mouth Every 6 (Six) Hours As Needed for Mild Pain., Disp: , Rfl:   •  [START ON 10/1/2022] amoxicillin-clavulanate (AUGMENTIN) 875-125 MG per tablet, Take 1 tablet by mouth Daily for 27 doses. Indications: Bone and/or Joint Infection, Disp: 27 tablet, Rfl: 0  •  apixaban (ELIQUIS) 2.5 MG tablet tablet, Take 1 tablet by mouth 2 (Two) Times a Day., Disp: 60 tablet, Rfl: 0  •  aspirin 81 MG EC tablet, Take 1 tablet by mouth Daily., Disp: , Rfl:   •  atorvastatin (LIPITOR) 40 MG tablet, Take 1 tablet by mouth Daily., Disp: 90 tablet, Rfl: 0  •  [START ON 10/1/2022] bumetanide (BUMEX) 0.5 MG tablet, Take 1 tablet by mouth Daily., Disp: 30 tablet, Rfl: 0  •  Cholecalciferol (Vitamin D3 Super Strength) 50 MCG (2000 UT) tablet, Take 2,000 Units by mouth Daily., Disp: , Rfl:   •  [START ON 10/1/2022] clopidogrel (PLAVIX) 75 MG tablet, Take 1 tablet by mouth Daily., Disp: 30 tablet, Rfl: 0  •  diphenhydrAMINE (BENADRYL) 25 mg capsule, Take 25 mg by mouth Every 6 (Six) Hours As Needed for Itching, Allergies or Sleep. Do not take at same time as potassium, Disp: , Rfl:   •  famotidine (PEPCID) 20 MG tablet, Take 1 tablet by mouth 2 (Two) Times a Day Before Meals., Disp: 60 tablet, Rfl: 0  •  guaiFENesin (MUCINEX) 600 MG 12 hr tablet, Take 2 tablets by mouth 2 (Two) Times a Day., Disp: , Rfl:   •  hydrALAZINE (APRESOLINE) 25 MG tablet, Take 1 tablet by mouth Every 8 (Eight) Hours., Disp: 90 tablet, Rfl: 0  •  insulin glargine (Lantus)  100 UNIT/ML injection, Inject 15 Units under the skin into the appropriate area as directed Every Night., Disp: , Rfl:   •  insulin lispro (HumaLOG) 100 UNIT/ML injection, Inject 12 Units under the skin into the appropriate area as directed 3 (Three) Times a Day Before Meals. (Patient taking differently: Inject 12 Units under the skin into the appropriate area as directed. 3 units tid + Sliding Scale Before Meals: 6 units- 200-250 8 units- 251-300 10 units- 301-350 12 units - 351-400), Disp: 20 mL, Rfl: 5  •  [START ON 10/1/2022] lactobacillus acidophilus (RISAQUAD) capsule capsule, Take 1 capsule by mouth Daily., Disp: 30 capsule, Rfl: 0  •  metoprolol tartrate (LOPRESSOR) 25 MG tablet, Take 0.5 (one-half) tablet by mouth Every 12 (Twelve) Hours., Disp: 60 tablet, Rfl: 0  •  minocycline (MINOCIN,DYNACIN) 100 MG capsule, Take 1 capsule by mouth Every 12 (Twelve) Hours for 6 days. Indications: Infection of the Skin and/or Soft Tissue, Disp: 12 capsule, Rfl: 0  •  ondansetron ODT (ZOFRAN-ODT) 4 MG disintegrating tablet, Place 4 mg on the tongue Every 8 (Eight) Hours As Needed for Nausea or Vomiting., Disp: , Rfl:   •  sennosides-docusate (PERICOLACE) 8.6-50 MG per tablet, Take 1 tablet by mouth Every Night. (Patient taking differently: Take 1 tablet by mouth every night at bedtime.), Disp: , Rfl:   •  vitamin B-12 (CYANOCOBALAMIN) 100 MCG tablet, Take 1 tablet by mouth Daily., Disp: , Rfl:   •  bisacodyl (DULCOLAX) 10 MG suppository, Insert 1 suppository into the rectum Daily As Needed for Constipation., Disp: , Rfl:   •  busPIRone (BUSPAR) 10 MG tablet, Take 0.5 (one-half) tablet by mouth 2 (Two) Times a Day As Needed for anxiety., Disp: 30 tablet, Rfl: 0  •  Diclofenac Sodium (VOLTAREN) 1 % gel gel, Apply 2 g topically to the appropriate area as directed 4 (Four) Times a Day. Right knee, Disp: , Rfl:   •  Glucagon 3 MG/DOSE powder, 3 mg into the nostril(s) as directed by provider 2 (Two) Times a Day As Needed.,  Disp: , Rfl:     Antibiotics:  Anti-Infectives (From admission, onward)    Ordered     Dose/Rate Route Frequency Start Stop    22 1210  amoxicillin-clavulanate (AUGMENTIN) 875-125 MG per tablet        Ordering Provider: Sandra Bernstein APRN    1 tablet Oral Every 24 Hours 10/01/22 0000 10/28/22 2359    22 1212  minocycline (MINOCIN,DYNACIN) 100 MG capsule        Ordering Provider: Sandra Bernstein APRN    100 mg Oral Every 12 Hours Scheduled 22 0000 10/06/22 23522 1533  ceFAZolin in dextrose (ANCEF) IVPB solution 2 g        Ordering Provider: Trey Forrest MD    2 g  over 30 Minutes Intravenous Once 22 1535 22 1552            Review of Systems:   See HPI      Physical Exam:   Vital Signs  Temp (24hrs), Av.1 °F (36.7 °C), Min:97.9 °F (36.6 °C), Max:98.3 °F (36.8 °C)    Temp  Min: 97.9 °F (36.6 °C)  Max: 98.3 °F (36.8 °C)  BP  Min: 130/80  Max: 152/80  Pulse  Min: 69  Max: 100  Resp  Min: 18  Max: 19  SpO2  Min: 79 %  Max: 100 %    GENERAL: She is elderly and somewhat debilitated appearing but in no acute distress  HEENT: Normocephalic, atraumatic.  PERRL. EOMI. No conjunctival injection. No icterus.  No labial ulcers  NECK: Supple   HEART: RRR; No murmur, rubs, gallops.   LUNGS: Few scattered rhonchi.  ABDOMEN: Soft, nontender, nondistended. No rebound or guarding. NO mass or HSM.  Extremities: She has a healed right metatarsal amputation site with no erythema or drainage.  There is a distal 2.5 cm diameter by 3 mm deep left dorsal foot ulcer with Decreased surrounding erythema.  MSK: No focal joint swelling or erythema   SKIN: Warm and dry without cutaneous eruptions on Inspection/palpation.    NEURO: She is less confused today she is alert and moves all of her extremities.  PSYCHIATRIC:  Cooperative with PE    Laboratory Data    Results from last 7 days   Lab Units 22  1147 22  0520 22  1558   WBC 10*3/mm3 6.87 6.66 5.77   HEMOGLOBIN g/dL 9.5*  11.2* 11.8*   HEMATOCRIT % 29.2* 35.6 36.1   PLATELETS 10*3/mm3 177 220 194     Results from last 7 days   Lab Units 09/30/22  1147   SODIUM mmol/L 135*   POTASSIUM mmol/L 4.8   CHLORIDE mmol/L 100   CO2 mmol/L 27.0   BUN mg/dL 39*   CREATININE mg/dL 1.19*   GLUCOSE mg/dL 251*   CALCIUM mg/dL 8.5*     Results from last 7 days   Lab Units 09/25/22  1558   ALK PHOS U/L 192*   BILIRUBIN mg/dL 0.6   ALT (SGPT) U/L 16   AST (SGOT) U/L 28                         Estimated Creatinine Clearance: 35.5 mL/min (A) (by C-G formula based on SCr of 1.19 mg/dL (H)).      Microbiology:  Blood Culture   Date Value Ref Range Status   09/21/2022 No growth at 4 days  Preliminary   09/21/2022 No growth at 4 days  Preliminary     No results found for: BCIDPCR, CXREFLEX, CSFCX, CULTURETIS  No results found for: CULTURES, HSVCX, URCX  No results found for: EYECULTURE, GCCX, HSVCULTURE, LABHSV  No results found for: LEGIONELLA, MRSACX, MUMPSCX, MYCOPLASCX  No results found for: NOCARDIACX, STOOLCX  No results found for: THROATCX, UNSTIMCULT, URINECX, CULTURE, VZVCULTUR  No results found for: VIRALCULTU, WOUNDCX        Radiology:  Imaging Results (Last 72 Hours)     Procedure Component Value Units Date/Time    [facility] OR Procedure [[id number]] Resulted: 09/28/22 1725     Updated: 09/28/22 1725    MRI Foot Left Without Contrast [[id number]] Collected: 09/28/22 0735     Updated: 09/28/22 0741    Narrative:      MRI FOOT LEFT WO CONTRAST-     Date of Exam: 9/27/2022 5:30 PM     Indication: rule out osteomyelitis, nonhealing left foot ulceration;  E87.5-Hyperkalemia; R11.2-Nausea with vomiting, unspecified;  E11.621-Type 2 diabetes mellitus with foot ulcer; L97.516-Non-pressure  chronic ulcer of other part of right foot with bone involvement without  evidence of necrosis; I73.9-Peripheral vascular disease, unspecified     Comparison: None available.     Technique: Multiplanar multisequence images of the foot were performed  according to  routine foot MRI protocol.     FINDINGS:     BONES  Bone marrow signal intensity is normal. Ulceration along the dorsal  aspect of the medial forefoot and midfoot. No drainable fluid collection  to suggest abscess. Moderate joint space narrowing first  metatarsophalangeal joint. Mild skin thickening along the dorsum of the  foot question cellulitis. No fractures or dislocations. Thickening  medial band plantar fascia with plantar calcaneal spur. No  intermetatarsal masses. Atrophy and intrinsic muscles of the foot.  Flexor extensor tendons are intact.        Impression:      Soft tissue edema along the dorsum of the foot with ulceration. Question  cellulitis. No drainable fluid collection to suggest abscess and no  findings of osteomyelitis.           This report was finalized on 9/28/2022 7:38 AM by Aniyah Hobson MD.               Impression:   1.  MSSA right foot osteomyelitis- she had an extensive, limb threatening right foot infection which finally healed after multiple surgical interventions and prolonged intravenous followed by oral antibiotic therapy.  I have considered her a very high risk for relapse of her MSSA right foot osteomyelitis and I have left her on chronic oral antibiotic suppression.  If she develops significant antibiotic side effects from Augmentin, we may need to discontinue the Augmentin.  Her foot is now markedly improved.  2.  Left dorsal foot ulcer/wound infection- her wound has failed to heal.  She underwent vascular intervention on 9/28 and hopefully this will facilitate healing of her wound. Her left dorsal foot ulcer appears partially improved today.  3.  History of right knee hematoma-this was quite extensive and required hospitalization in 8/22.  This is now resolved.  3.  Type 2 diabetes mellitus-this increases her risk for recurrent infections  4.  Peripheral vascular disease-she has arterial peripheral vascular disease and also venous stasis disease.  5.  Peripheral neuropathy  6.   Cognitive impairment- she has had intermittent cognitive impairment that has at times appear to be medication related  7.  Stage II-3 chronic kidney disease  8.  Systolic/diastolic congestive heart failure  9.  Recurrent nausea/vomiting-Improved  10.  Atrial fibrillation    PLAN/RECOMMENDATIONS:   1.  Continue Augmentin 875 mg p.o. daily  2.  Continue wound care   3.  Minocycline 100 mg p.o. twice daily  4.  Discharge to rehab  5.  Follow-up with me next week    I discussed her complex situation with Dr. Smith Arriola again today.    Stef Pete MD  9/30/2022  20:34 EDT

## 2022-10-03 ENCOUNTER — TELEPHONE (OUTPATIENT)
Dept: CARDIAC SURGERY | Facility: CLINIC | Age: 82
End: 2022-10-03

## 2022-10-03 ENCOUNTER — TRANSITIONAL CARE MANAGEMENT TELEPHONE ENCOUNTER (OUTPATIENT)
Dept: CALL CENTER | Facility: HOSPITAL | Age: 82
End: 2022-10-03

## 2022-10-03 ENCOUNTER — HOSPITAL ENCOUNTER (OUTPATIENT)
Dept: PHYSICAL THERAPY | Facility: HOSPITAL | Age: 82
Setting detail: THERAPIES SERIES
Discharge: HOME | End: 2022-10-03

## 2022-10-03 DIAGNOSIS — S91.302D OPEN WOUND OF LEFT FOOT, SUBSEQUENT ENCOUNTER: Primary | ICD-10-CM

## 2022-10-03 PROCEDURE — 97597 DBRDMT OPN WND 1ST 20 CM/<: CPT

## 2022-10-03 PROCEDURE — 97162 PT EVAL MOD COMPLEX 30 MIN: CPT

## 2022-10-03 NOTE — THERAPY EVALUATION
Outpatient Rehabilitation - Wound/Debridement Initial Eval  [facility]     Patient Name: Susan Anderson  : 1940  MRN: [id number]  Today's Date: 10/3/2022                  Admit Date: 10/3/2022    Visit Dx:    ICD-10-CM ICD-9-CM   1. Open wound of left foot, subsequent encounter  S91.302D V58.89     892.0       Patient Active Problem List   Diagnosis   • Diabetic foot ulcer ([facility])   • PVD (peripheral vascular disease) ([facility])   • Osteomyelitis ([facility])   • Diabetes mellitus with neuropathy ([facility])   • Essential hypertension   • Pyogenic inflammation of bone ([facility])   • Hyperglycemia   • Pneumonia due to infectious organism   • Mitral valve disease   • NICM (nonischemic cardiomyopathy) ([facility])   • Coronary artery disease involving native coronary artery of native heart without angina pectoris   • Dyslipidemia   • Chronic combined systolic and diastolic heart failure ([facility])   • Lumbar stenosis with neurogenic claudication   • Spondylosis of lumbar region without myelopathy or radiculopathy   • Spondylolisthesis, lumbar region   • Degeneration of lumbar or lumbosacral intervertebral disc   • Gait disturbance   • Physical deconditioning   • Sarcopenia   • Weakness   • Anemia, chronic disease   • Fall   • Dizziness   • Acoustic neuroma ([facility])   • Effusion of right knee   • Right knee pain   • Atrial fibrillation on Eliquis   • Pressure injury of skin of sacral region   • Sleep apnea        Past Medical History:   Diagnosis Date   • Arthritis    • Asthma  - double pneumonia    Currently on inhaler and nebulizer   • Atrial fibrillation ([facility]) 2022   • Cancer ([facility])     cervical cancer, skin cancer   • CHF (congestive heart failure) ([facility]) 2021   • Chronic kidney disease Related to diabetes   • Coronary artery disease 2021 DX for hear failure   • Diabetes mellitus ([facility]) 30 years    Seeing Dr. Lam 1st time Aug 19   • Gout    • Hx of colonoscopy    • Hyperlipidemia Reference current labs x 2-3yrs approx   •  Hypertension 30 years   • Migraine    • Mitral valve disease    • Mitral valve disease    • Osteomyelitis ([facility])    • Peripheral neuropathy    • Sleep apnea    • Type 2 diabetes mellitus ([facility])     30 years        Past Surgical History:   Procedure Laterality Date   • ABDOMINAL HYSTERECTOMY W/SALPINGECTOMY     • AMPUTATION  Right great toe, 1st 1/3 metatarsal -Reg 4/14/21   • AORTAGRAM N/A 4/9/2021    Procedure: AORTAGRAM WITH OR WITHOUT RUNOFFS, WITH Co2;  Surgeon: Trey Forrest MD;  Location:  [facility];  Service: Vascular;  Laterality: N/A;   • AORTAGRAM N/A 9/28/2022    Procedure: CO2 ANGIOGRAM, LEFT SFA ANGIOPLASTY WITH DRUG ELUTING BALLOON, LEFT SFA STENT PLACEMENT ;  Surgeon: Trey Forrest MD;  Location:  [facility];  Service: Vascular;  Laterality: N/A;  FLUORO: 13.12  DOSE 162mGy  CONTRAST: Isovue 350: 15ml   • APPENDECTOMY     • BRAIN TUMOR EXCISION      laser surgery    • CARDIAC CATHETERIZATION N/A 6/21/2021    Procedure: LEFT HEART CATH;  Surgeon: Anjum Ceballos MD;  Location: [facility] CATH INVASIVE LOCATION;  Service: Cardiology;  Laterality: N/A;   • CATARACT EXTRACTION W/ INTRAOCULAR LENS  IMPLANT, BILATERAL     • CHOLECYSTECTOMY     • EYE SURGERY     • HYSTERECTOMY     • TOE SURGERY     • TRANS METATARSAL AMPUTATION Right 4/14/2021    Procedure: AMPUTATION TRANS METATARSAL RIGHT GREAT TOE;  Surgeon: Valdez Finney MD;  Location: [facility];  Service: Vascular;  Laterality: Right;        Patient History     Row Name 10/03/22 0845 10/02/22 1626          History    Chief Complaint Ulcer, wound or other skin conditions  -VERONICA --     Type of Pain -- Back pain;Other pain (P)    -patient     Date Current Problem(s) Began -- 08/10/22 (P)    -patient     Brief Description of Current Complaint Family reports left foot wound started with episode of leg swelling, with blister formation on foot that progressed to non-healing open wound.  Pt now s/p vascular intervention with stent to LLE.  -VERONICA  Cellulitis left foot dorsal; arterial stent placed last Weds. Reg/Adriane (P)    -patient     Patient/Caregiver Goals -- Return to prior level of function;Improve mobility;Improve strength;Heal wound (P)    -patient     Hand Dominance -- right-handed (P)    -patient     Occupation/sports/leisure activities -- Travel, Retired Principal (P)    -patient     Patient seeing anyone else for problem(s)? -- Yes f/u with renal for CK disease, [facility] for heart, Pete for foot ulcer, and you (P)    -patient     How has patient tried to help current problem? Family has left dressing in place from d/c on Friday, per PT instruction  -JM --     What clinical tests have you had for this problem? -- MRI;Blood Work;Other 1 (comment) (P)    -patient     Additional Clinical Tests -- Ct angiogram - stents placed last week (P)    -patient     Are you or can you be pregnant -- No (P)    -patient            Pain     Pain at Present 0  -JM --            Fall Risk Assessment    Any falls in the past year: -- Yes (P)    -patient     Factors that contributed to the fall: -- Lost balance;Fatigue;Other (comment) (P)    -patient     Other factors that contributed to the fall: -- Weak (P)    -patient            Services    Prior Rehab/Home Health Experiences -- Yes (P)    -patient     Are you currently receiving Home Health services -- No (P)    -patient            Daily Activities    Primary Language -- English (P)    -patient     Are you able to read -- Yes (P)    -patient     Are you able to write -- Yes (P)    -patient     How does patient learn best? -- Listening;Reading;Demonstration;Pictures/Video (P)    -patient     Barriers to learning None  -JM --     Pt Participated in POC and Goals Yes  -JM --            Safety    Are you being hurt, hit, or frightened by anyone at home or in your life? -- No (P)    -patient     Are you being neglected by a caregiver -- No (P)    -patient     Have you had any of the following issues with --  Anxiety (P)    -patient           User Key  (r) = Recorded By, (t) = Taken By, (c) = Cosigned By    Initials Name Provider Type    Shi Mane, PT Physical Therapist    patient Susan Anderson --                EVALUATION   PT Ortho     Row Name 10/03/22 0845       Subjective Comments    Subjective Comments Pt without complaints.  Daughter reports pt has follow-up with Dr. Pete tomorrow.  Daughter is comfortable with dressing changes, assisted pt during previous wound care episode and has multiple wound care supplies at home.  States she thinks wound looks about the same today compared to Friday.  -       Subjective Pain    Able to rate subjective pain? yes  -JM    Pre-Treatment Pain Level 0  -JM    Post-Treatment Pain Level 0  -    Subjective Pain Comment DPN  -JM       Transfers    Comment, (Transfers) Pt remained seated in w/c for tx, assisted by dtr  -          User Key  (r) = Recorded By, (t) = Taken By, (c) = Cosigned By    Initials Name Provider Type    Shi Mane, PT Physical Therapist               LDA Wound     Row Name 10/03/22 0845             Wound 09/21/22 1850 Left anterior foot Arterial Ulcer    Wound - Properties Group Placement Date: 09/21/22  - Placement Time: 1850  - Present on Hospital Admission: Y  -LH Side: Left  -LH Orientation: anterior  -LH Location: foot  -LH Primary Wound Type: Arterial ulc  -MF      Dressing Appearance intact;moist drainage  ag foam and kerlix/spandage plced prior to d/c  -      Base moist;necrotic;red;yellow;subcutaneous;other (see comments)  thick biofilm layer  -      Periwound moist;macerated;redness  min maceration, min erythema  -      Periwound Temperature warm  -      Periwound Skin Turgor soft  -JM      Edges open  -JM      Wound Length (cm) 2.4 cm  -JM      Wound Width (cm) 2.4 cm  -JM      Wound Depth (cm) --  obscured  -JM      Wound Surface Area (cm^2) 5.76 cm^2  -JM      Drainage Characteristics/Odor  "serosanguineous;yellow  -      Drainage Amount moderate  -      Care, Wound cleansed with;wound cleanser;debrided;honey applied  -      Dressing Care dressing applied;antimicrobial agent applied;foam;border dressing  HFBt, 4\" optifoam  -      Periwound Care cleansed with pH balanced cleanser;dry periwound area maintained  -      Retired Wound - Properties Group Placement Date: 09/21/22 - Placement Time: 1850 -LH Present on Hospital Admission: Y  - Side: Left  - Orientation: anterior  -LH Location: foot  -LH Primary Wound Type: Arterial ulc  -MF      Retired Wound - Properties Group Date first assessed: 09/21/22 - Time first assessed: 1850 -LH Present on Hospital Admission: Y  - Side: Left  - Location: foot  - Primary Wound Type: Arterial ulc  -MF            User Key  (r) = Recorded By, (t) = Taken By, (c) = Cosigned By    Initials Name Provider Type    Shi Mane, PT Physical Therapist     Shannon Valente RN Registered Nurse    Funmilayo Concepcion RN Registered Nurse                  WOUND DEBRIDEMENT  Total area of Debridement: 4cmsq  Debridement Site 1  Location- Site 1: L foot wound  Selective Debridement- Site 1: Wound Surface <20cmsq  Instruments- Site 1: #15, scapel, tweezers  Excised Tissue Description- Site 1: moderate, slough, other (comment) (biofilm)  Bleeding- Site 1: none              Therapy Education     Row Name 10/03/22 0845             Therapy Education    Education Details Keep wound bandaged, do not get wet when showering or leave ARTUR.  Change dressing every 2-3 days.  Discussed potential addition of MIST if approved by insurance.  -VERONICA      Given Symptoms/condition management;Bandaging/dressing change  -      Program New  -VERONICA      How Provided Verbal;Demonstration  -VERONICA      Provided to Patient;Caregiver  daughter  -VERONICA      Level of Understanding Verbalized  -VERONICA            User Key  (r) = Recorded By, (t) = Taken By, (c) = Cosigned By    Initials Name " Provider Type    Shi Mane, PT Physical Therapist                Recommendation and Plan   PT Assessment/Plan     Row Name 10/03/22 0845          PT Assessment    Functional Limitations Performance in self-care ADL;Impaired gait;Other (comment)  wound management limitations  -     Impairments Impaired arterial circulation;Integumentary integrity;Pain;Impaired sensory integrity  -     Assessment Comments Pt presents with moderate complexity with non-healing wound of left foot present greater than 30 days, in setting of DM and arterial disease, now s/p revascularization.  Wound bed with necrotic tissue that will require skilled PT for debridement and advanced dressings management.  Pt will also benefit from MIST to increase local blood flow and stimulate granulation for wound healing.  -     Rehab Potential Fair  -     Patient/caregiver participated in establishment of treatment plan and goals Yes  -     Patient would benefit from skilled therapy intervention Yes  -            PT Plan    PT Frequency 2x/week  -     Predicted Duration of Therapy Intervention (PT) 24 visits  -     Planned CPT's? PT EVAL MOD COMPLELITY: 32416;PT PIPE DEBRIDE OPEN WOUND UP TO 20 CM: 52798;PT NLFU MIST: 94855;PT NONSELECT DEBRIDE 15 MIN: 56469;PT SELF CARE/MGMT/TRAIN 15 MIN: 39816  -     Physical Therapy Interventions (Optional Details) patient/family education;wound care  -     PT Plan Comments debridement, MIST if covered by insurance  -           User Key  (r) = Recorded By, (t) = Taken By, (c) = Cosigned By    Initials Name Provider Type    Shi Mane, PT Physical Therapist                  Goals   PT OP Goals     Row Name 10/03/22 0845          PT Short Term Goals    STG 1 Reduce wound dimensions by 25% as evidence of wound healing.  -     STG 1 Progress New  -     STG 2 Increase granulation formation to 25% of wound bed or greater, to promote wound closure.  -     STG 2 Progress New   -VERONICA            Long Term Goals    LTG 1 Pt/family independent with home dressing changes.  -VERONICA     LTG 1 Progress New  -VERONICA     LTG 2 L foot wound to be closed/resurfaced to allow for full indepenent function.  -VERONICA     LTG 2 Progress New  -VERONICA            Time Calculation    PT Goal Re-Cert Due Date 01/01/23  -VERONICA           User Key  (r) = Recorded By, (t) = Taken By, (c) = Cosigned By    Initials Name Provider Type    Shi Mane, PT Physical Therapist                Time Calculation: Start Time: 0845  Untimed Charges  PT Eval/Re-eval Minutes: 30  Wound Care: 34263 Selective debridement  81459-Tclmcifam debridement: 25  Total Minutes  Untimed Charges Total Minutes: 55   Total Minutes: 55  Therapy Charges for Today     Code Description Service Date Service Provider Modifiers Qty    30367059858 HC PIPE DEBRIDE OPEN WOUND UP TO 20CM 10/3/2022 Shi Cordoba, PT GP 1    25327249253 HC PT EVAL MOD COMPLEXITY 2 10/3/2022 Shi Cordoba, PT GP 1                Shi Cordoba, PT  10/3/2022

## 2022-10-03 NOTE — OUTREACH NOTE
Call Center TCM Note    Flowsheet Row Responses   [facility] patient discharged from? [facility]   Does the patient have one of the following disease processes/diagnoses(primary or secondary)? Other   TCM attempt successful? Yes  [No verbal release on file.]   Call start time 1257   Call end time 1317   Discharge diagnosis N/V, LACEY on CKD, hyperkalemia, anemia, DM, A-fib, chronic CHF   Is patient permission given to speak with other caregiver? Yes   List who call center can speak with dtr   Person spoke with today (if not patient) and relationship dtr   Medication alerts for this patient Per dtr they did not p/u buspar as they want to discuss with PCP first.   Meds reviewed with patient/caregiver? Yes   Is the patient having any side effects they believe may be caused by any medication additions or changes? No   Does the patient have all medications ordered at discharge? Yes   Is the patient taking all medications as directed (includes completed medication regime)? Yes   Comments Hosp f/u appt 10-4-22 @1030 with PCP   Psychosocial issues? No   Psychosocial comments July 27 fell in bathroom, no caregiver, laid for 10hrs, went to ER.    Comments Doing outpt wound care 2x/wk, dtr is changing every 2 days. Left foot wound from previous edema, looking same, per dtr. Groin bruised from cath.    Did the patient receive a copy of their discharge instructions? Yes   Nursing interventions Reviewed instructions with patient   What is the patient's perception of their health status since discharge? Improving   Is the patient/caregiver able to teach back signs and symptoms related to disease process for when to call PCP? Yes   Is the patient/caregiver able to teach back signs and symptoms related to disease process for when to call 911? Yes   Is the patient/caregiver able to teach back the hierarchy of who to call/visit for symptoms/problems? PCP, Specialist, Home health nurse, Urgent Care, ED, 911 Yes   If the patient is a  current smoker, are they able to teach back resources for cessation? Not a smoker   TCM call completed? Yes          Barb Sanchez RN    10/3/2022, 13:17 EDT

## 2022-10-03 NOTE — TELEPHONE ENCOUNTER
I called and spoke with Katie, Mrs. Davis's Daughter who said that her Mother had been to wound care w/ Shi in PT this morning she also has an appt with ID tomorrow. Katie was inquiring when her mother's follow up will be I told her since D/c was on Friday 9/30 the follow would be scheduled with in the next few days, she v/u.

## 2022-10-03 NOTE — TELEPHONE ENCOUNTER
Caller: GREG ELLIS    Relationship: Emergency Contact    Best call back number:  [phone]    What is the best time to reach you: ANY    Who are you requesting to speak with (clinical staff, provider,  specific staff member): CLINICAL     What was the call regarding: GREGELMO ELLIS WOULD LIKE TO SCHEDULE POST OP APPOINTMENT. SHE ALSO HAS A FEW QUESTION REGARDING THE DRESSING AND WETHER TO SHOWER.     Do you require a callback: YES

## 2022-10-04 ENCOUNTER — TELEMEDICINE (OUTPATIENT)
Dept: INTERNAL MEDICINE | Facility: CLINIC | Age: 82
End: 2022-10-04

## 2022-10-04 DIAGNOSIS — E87.5 HYPERKALEMIA: ICD-10-CM

## 2022-10-04 DIAGNOSIS — F41.9 ANXIETY: ICD-10-CM

## 2022-10-04 DIAGNOSIS — R53.81 PHYSICAL DECONDITIONING: Primary | ICD-10-CM

## 2022-10-04 DIAGNOSIS — Z79.4 TYPE 2 DIABETES MELLITUS WITH HYPERGLYCEMIA, WITH LONG-TERM CURRENT USE OF INSULIN: ICD-10-CM

## 2022-10-04 DIAGNOSIS — E11.65 TYPE 2 DIABETES MELLITUS WITH HYPERGLYCEMIA, WITH LONG-TERM CURRENT USE OF INSULIN: ICD-10-CM

## 2022-10-04 PROCEDURE — 99495 TRANSJ CARE MGMT MOD F2F 14D: CPT | Performed by: INTERNAL MEDICINE

## 2022-10-04 PROCEDURE — 1111F DSCHRG MED/CURRENT MED MERGE: CPT | Performed by: INTERNAL MEDICINE

## 2022-10-04 NOTE — PROGRESS NOTES
Video visit: Transitional Care Follow Up Visit  Subjective     Susan Anderson is a 82 y.o. female who presents for a transitional care management visit.    Within 48 business hours after discharge our office contacted her via telephone to coordinate her care and needs.      I reviewed and discussed the details of that call along with the discharge summary, hospital problems, inpatient lab results, inpatient diagnostic studies, and consultation reports with Susan.     Current outpatient and discharge medications have been reconciled for the patient.  Reviewed by: Arleen Doherty MD      Date of TCM Phone Call 9/30/2022   [facility]   Date of Admission 9/21/2022   Date of Discharge 9/30/2022   Discharge Disposition Home or Self Care     Risk for Readmission (LACE) Score: 14 (10/10/2022  6:00 AM)      History of Present Illness   Course During Hospital Stay:  Admitted to  for hyperkalemia and LACEY. Doing well, has all her f/u appts scheduled.   DM- a1c 7.8 September.   Requesting valium prn for anxiety. Was given this during rehab stay, did not get any rx for this on d/c.   She was given rx for buspar but did not fill it.   Has appt w/ nephro for labs.     The following portions of the patient's history were reviewed and updated as appropriate: allergies, current medications, past family history, past medical history, past social history, past surgical history and problem list.    Review of Systems   Constitutional: Positive for fatigue. Negative for activity change, appetite change, chills, diaphoresis, fever and unexpected weight change.   HENT: Negative for congestion, dental problem, drooling, ear discharge, ear pain, facial swelling, hearing loss and mouth sores.    Eyes: Negative for pain, discharge and itching.   Respiratory: Negative for apnea, cough, choking, chest tightness and shortness of breath.    Cardiovascular: Negative for chest pain, palpitations and leg swelling.   Gastrointestinal:  Negative for abdominal distention, abdominal pain, blood in stool, constipation and diarrhea.   Endocrine: Negative for cold intolerance, heat intolerance, polydipsia and polyuria.   Genitourinary: Negative for difficulty urinating, dysuria, frequency and hematuria.   Skin: Negative for color change, pallor, rash and wound.   Allergic/Immunologic: Negative for environmental allergies, food allergies and immunocompromised state.   Neurological: Negative for dizziness, weakness and light-headedness.   Psychiatric/Behavioral: Negative for agitation, behavioral problems, confusion, decreased concentration and self-injury. The patient is not nervous/anxious.    All other systems reviewed and are negative.      Objective   Physical Exam    Assessment & Plan   Diagnoses and all orders for this visit:    1. Physical deconditioning (Primary)  -     Ambulatory Referral to Physical Therapy    2. Hyperkalemia    3. Anxiety    4. Type 2 diabetes mellitus with hyperglycemia, with long-term current use of insulin (HCC)      Advised can liberalize a1c goal to 8 due to age and medical comorbidities. Refill insulin as needed. Has f/u with wound care today and with nephro later regarding bumex dose. Adv try buspar and can discuss valium at medicare wellness visit. Will get labs at that time. Ref to PT for weakness. Advised activity as tolerated.       You have chosen to receive care through a telehealth visit.  Do you consent to use a video/audio connection for your medical care today? Yes      I spent 50 mins on this encounter. Pt was at home and provider was at home during this encounter.

## 2022-10-06 ENCOUNTER — HOSPITAL ENCOUNTER (OUTPATIENT)
Facility: HOSPITAL | Age: 82
LOS: 1 days | Discharge: HOME | End: 2022-10-12
Attending: EMERGENCY MEDICINE | Admitting: INTERNAL MEDICINE

## 2022-10-06 ENCOUNTER — APPOINTMENT (OUTPATIENT)
Dept: GENERAL RADIOLOGY | Facility: HOSPITAL | Age: 82
End: 2022-10-06

## 2022-10-06 ENCOUNTER — APPOINTMENT (OUTPATIENT)
Dept: CT IMAGING | Facility: HOSPITAL | Age: 82
End: 2022-10-06

## 2022-10-06 DIAGNOSIS — E11.621 DIABETIC ULCER OF TOE OF RIGHT FOOT ASSOCIATED WITH TYPE 2 DIABETES MELLITUS, WITH BONE INVOLVEMENT WITHOUT EVIDENCE OF NECROSIS: ICD-10-CM

## 2022-10-06 DIAGNOSIS — E78.5 DYSLIPIDEMIA: ICD-10-CM

## 2022-10-06 DIAGNOSIS — R55 SYNCOPE, UNSPECIFIED SYNCOPE TYPE: Primary | ICD-10-CM

## 2022-10-06 DIAGNOSIS — R77.8 ELEVATED TROPONIN: ICD-10-CM

## 2022-10-06 DIAGNOSIS — I05.9 MITRAL VALVE DISEASE: ICD-10-CM

## 2022-10-06 DIAGNOSIS — L97.516 DIABETIC ULCER OF TOE OF RIGHT FOOT ASSOCIATED WITH TYPE 2 DIABETES MELLITUS, WITH BONE INVOLVEMENT WITHOUT EVIDENCE OF NECROSIS: ICD-10-CM

## 2022-10-06 DIAGNOSIS — N17.9 AKI (ACUTE KIDNEY INJURY): ICD-10-CM

## 2022-10-06 DIAGNOSIS — K92.2 GASTROINTESTINAL HEMORRHAGE, UNSPECIFIED GASTROINTESTINAL HEMORRHAGE TYPE: ICD-10-CM

## 2022-10-06 DIAGNOSIS — M48.062 LUMBAR STENOSIS WITH NEUROGENIC CLAUDICATION: ICD-10-CM

## 2022-10-06 DIAGNOSIS — Z79.01 ANTICOAGULATED: ICD-10-CM

## 2022-10-06 DIAGNOSIS — M47.816 SPONDYLOSIS OF LUMBAR REGION WITHOUT MYELOPATHY OR RADICULOPATHY: ICD-10-CM

## 2022-10-06 DIAGNOSIS — E08.40 DIABETES MELLITUS DUE TO UNDERLYING CONDITION WITH DIABETIC NEUROPATHY, UNSPECIFIED WHETHER LONG TERM INSULIN USE: ICD-10-CM

## 2022-10-06 DIAGNOSIS — I10 ESSENTIAL HYPERTENSION: ICD-10-CM

## 2022-10-06 DIAGNOSIS — D64.9 ANEMIA, UNSPECIFIED TYPE: ICD-10-CM

## 2022-10-06 DIAGNOSIS — N18.31 STAGE 3A CHRONIC KIDNEY DISEASE: ICD-10-CM

## 2022-10-06 DIAGNOSIS — R53.81 PHYSICAL DECONDITIONING: ICD-10-CM

## 2022-10-06 DIAGNOSIS — I24.8 DEMAND ISCHEMIA: ICD-10-CM

## 2022-10-06 DIAGNOSIS — I48.0 PAROXYSMAL ATRIAL FIBRILLATION: ICD-10-CM

## 2022-10-06 DIAGNOSIS — K25.4 GASTROINTESTINAL HEMORRHAGE ASSOCIATED WITH GASTRIC ULCER: ICD-10-CM

## 2022-10-06 DIAGNOSIS — M25.461 EFFUSION OF RIGHT KNEE: ICD-10-CM

## 2022-10-06 PROBLEM — E83.42 HYPOMAGNESEMIA: Status: ACTIVE | Noted: 2022-10-06

## 2022-10-06 LAB
ABO GROUP BLD: NORMAL
ALBUMIN SERPL-MCNC: 2.9 G/DL (ref 3.5–5.2)
ALBUMIN/GLOB SERPL: 1 G/DL
ALP SERPL-CCNC: 157 U/L (ref 39–117)
ALT SERPL W P-5'-P-CCNC: 7 U/L (ref 1–33)
ANION GAP SERPL CALCULATED.3IONS-SCNC: 14 MMOL/L (ref 5–15)
AST SERPL-CCNC: 17 U/L (ref 1–32)
BACTERIA UR QL AUTO: ABNORMAL /HPF
BASOPHILS # BLD AUTO: 0.03 10*3/MM3 (ref 0–0.2)
BASOPHILS NFR BLD AUTO: 0.3 % (ref 0–1.5)
BILIRUB SERPL-MCNC: 0.7 MG/DL (ref 0–1.2)
BILIRUB UR QL STRIP: NEGATIVE
BLD GP AB SCN SERPL QL: NEGATIVE
BUN SERPL-MCNC: 93 MG/DL (ref 8–23)
BUN/CREAT SERPL: 64.1 (ref 7–25)
CALCIUM SPEC-SCNC: 9.1 MG/DL (ref 8.6–10.5)
CHLORIDE SERPL-SCNC: 98 MMOL/L (ref 98–107)
CLARITY UR: ABNORMAL
CO2 SERPL-SCNC: 25 MMOL/L (ref 22–29)
COLOR UR: YELLOW
CREAT SERPL-MCNC: 1.45 MG/DL (ref 0.57–1)
DEPRECATED RDW RBC AUTO: 57.6 FL (ref 37–54)
DEVELOPER EXPIRATION DATE: ABNORMAL
DEVELOPER LOT NUMBER: ABNORMAL
EGFRCR SERPLBLD CKD-EPI 2021: 35.9 ML/MIN/1.73
EOSINOPHIL # BLD AUTO: 0.19 10*3/MM3 (ref 0–0.4)
EOSINOPHIL NFR BLD AUTO: 2.1 % (ref 0.3–6.2)
ERYTHROCYTE [DISTWIDTH] IN BLOOD BY AUTOMATED COUNT: 18.1 % (ref 12.3–15.4)
EXPIRATION DATE: ABNORMAL
FECAL OCCULT BLOOD SCREEN, POC: POSITIVE
GLOBULIN UR ELPH-MCNC: 2.8 GM/DL
GLUCOSE SERPL-MCNC: 170 MG/DL (ref 65–99)
GLUCOSE UR STRIP-MCNC: NEGATIVE MG/DL
HCT VFR BLD AUTO: 23.8 % (ref 34–46.6)
HGB BLD-MCNC: 7.9 G/DL (ref 12–15.9)
HGB UR QL STRIP.AUTO: NEGATIVE
HOLD SPECIMEN: NORMAL
HYALINE CASTS UR QL AUTO: ABNORMAL /LPF
IMM GRANULOCYTES # BLD AUTO: 0.28 10*3/MM3 (ref 0–0.05)
IMM GRANULOCYTES NFR BLD AUTO: 3 % (ref 0–0.5)
KETONES UR QL STRIP: NEGATIVE
LEUKOCYTE ESTERASE UR QL STRIP.AUTO: ABNORMAL
LYMPHOCYTES # BLD AUTO: 1.82 10*3/MM3 (ref 0.7–3.1)
LYMPHOCYTES NFR BLD AUTO: 19.7 % (ref 19.6–45.3)
Lab: ABNORMAL
MAGNESIUM SERPL-MCNC: 1.5 MG/DL (ref 1.6–2.4)
MCH RBC QN AUTO: 29.3 PG (ref 26.6–33)
MCHC RBC AUTO-ENTMCNC: 33.2 G/DL (ref 31.5–35.7)
MCV RBC AUTO: 88.1 FL (ref 79–97)
MONOCYTES # BLD AUTO: 0.75 10*3/MM3 (ref 0.1–0.9)
MONOCYTES NFR BLD AUTO: 8.1 % (ref 5–12)
NEGATIVE CONTROL: NEGATIVE
NEUTROPHILS NFR BLD AUTO: 6.19 10*3/MM3 (ref 1.7–7)
NEUTROPHILS NFR BLD AUTO: 66.8 % (ref 42.7–76)
NITRITE UR QL STRIP: NEGATIVE
NRBC BLD AUTO-RTO: 0.6 /100 WBC (ref 0–0.2)
PH UR STRIP.AUTO: <=5 [PH] (ref 5–8)
PLATELET # BLD AUTO: 333 10*3/MM3 (ref 140–450)
PMV BLD AUTO: 10.7 FL (ref 6–12)
POSITIVE CONTROL: POSITIVE
POTASSIUM SERPL-SCNC: 3.9 MMOL/L (ref 3.5–5.2)
PROT SERPL-MCNC: 5.7 G/DL (ref 6–8.5)
PROT UR QL STRIP: ABNORMAL
QT INTERVAL: 418 MS
QTC INTERVAL: 399 MS
RBC # BLD AUTO: 2.7 10*6/MM3 (ref 3.77–5.28)
RBC # UR STRIP: ABNORMAL /HPF
REF LAB TEST METHOD: ABNORMAL
RH BLD: POSITIVE
SODIUM SERPL-SCNC: 137 MMOL/L (ref 136–145)
SP GR UR STRIP: 1.02 (ref 1–1.03)
SQUAMOUS #/AREA URNS HPF: ABNORMAL /HPF
T&S EXPIRATION DATE: NORMAL
TROPONIN T SERPL-MCNC: 0.18 NG/ML (ref 0–0.03)
UROBILINOGEN UR QL STRIP: ABNORMAL
WBC # UR STRIP: ABNORMAL /HPF
WBC NRBC COR # BLD: 9.26 10*3/MM3 (ref 3.4–10.8)
WHOLE BLOOD HOLD COAG: NORMAL
WHOLE BLOOD HOLD SPECIMEN: NORMAL
YEAST URNS QL MICRO: ABNORMAL /HPF

## 2022-10-06 PROCEDURE — P9612 CATHETERIZE FOR URINE SPEC: HCPCS

## 2022-10-06 PROCEDURE — 99285 EMERGENCY DEPT VISIT HI MDM: CPT

## 2022-10-06 PROCEDURE — 25010000002 MORPHINE PER 10 MG: Performed by: EMERGENCY MEDICINE

## 2022-10-06 PROCEDURE — 87086 URINE CULTURE/COLONY COUNT: CPT | Performed by: EMERGENCY MEDICINE

## 2022-10-06 PROCEDURE — 70450 CT HEAD/BRAIN W/O DYE: CPT

## 2022-10-06 PROCEDURE — 81001 URINALYSIS AUTO W/SCOPE: CPT | Performed by: EMERGENCY MEDICINE

## 2022-10-06 PROCEDURE — 84484 ASSAY OF TROPONIN QUANT: CPT | Performed by: EMERGENCY MEDICINE

## 2022-10-06 PROCEDURE — 83735 ASSAY OF MAGNESIUM: CPT | Performed by: EMERGENCY MEDICINE

## 2022-10-06 PROCEDURE — 96365 THER/PROPH/DIAG IV INF INIT: CPT

## 2022-10-06 PROCEDURE — 25010000002 DIAZEPAM PER 5 MG: Performed by: EMERGENCY MEDICINE

## 2022-10-06 PROCEDURE — 86850 RBC ANTIBODY SCREEN: CPT | Performed by: NURSE PRACTITIONER

## 2022-10-06 PROCEDURE — 80053 COMPREHEN METABOLIC PANEL: CPT | Performed by: EMERGENCY MEDICINE

## 2022-10-06 PROCEDURE — 85025 COMPLETE CBC W/AUTO DIFF WBC: CPT | Performed by: EMERGENCY MEDICINE

## 2022-10-06 PROCEDURE — 86923 COMPATIBILITY TEST ELECTRIC: CPT

## 2022-10-06 PROCEDURE — 96375 TX/PRO/DX INJ NEW DRUG ADDON: CPT

## 2022-10-06 PROCEDURE — 96361 HYDRATE IV INFUSION ADD-ON: CPT

## 2022-10-06 PROCEDURE — 25010000002 MAGNESIUM SULFATE IN D5W 1G/100ML (PREMIX) 1-5 GM/100ML-% SOLUTION: Performed by: NURSE PRACTITIONER

## 2022-10-06 PROCEDURE — 86901 BLOOD TYPING SEROLOGIC RH(D): CPT | Performed by: NURSE PRACTITIONER

## 2022-10-06 PROCEDURE — 71045 X-RAY EXAM CHEST 1 VIEW: CPT

## 2022-10-06 PROCEDURE — 93005 ELECTROCARDIOGRAM TRACING: CPT | Performed by: EMERGENCY MEDICINE

## 2022-10-06 PROCEDURE — 82270 OCCULT BLOOD FECES: CPT | Performed by: NURSE PRACTITIONER

## 2022-10-06 PROCEDURE — 86900 BLOOD TYPING SEROLOGIC ABO: CPT | Performed by: NURSE PRACTITIONER

## 2022-10-06 PROCEDURE — 99223 1ST HOSP IP/OBS HIGH 75: CPT | Performed by: INTERNAL MEDICINE

## 2022-10-06 PROCEDURE — 74176 CT ABD & PELVIS W/O CONTRAST: CPT

## 2022-10-06 RX ORDER — ATORVASTATIN CALCIUM 40 MG/1
40 TABLET, FILM COATED ORAL DAILY
Status: DISCONTINUED | OUTPATIENT
Start: 2022-10-07 | End: 2022-10-12 | Stop reason: HOSPADM

## 2022-10-06 RX ORDER — ACETAMINOPHEN 325 MG/1
650 TABLET ORAL EVERY 6 HOURS PRN
Status: DISCONTINUED | OUTPATIENT
Start: 2022-10-06 | End: 2022-10-12 | Stop reason: HOSPADM

## 2022-10-06 RX ORDER — SODIUM CHLORIDE 9 MG/ML
125 INJECTION, SOLUTION INTRAVENOUS CONTINUOUS
Status: DISCONTINUED | OUTPATIENT
Start: 2022-10-06 | End: 2022-10-09

## 2022-10-06 RX ORDER — PANTOPRAZOLE SODIUM 40 MG/10ML
40 INJECTION, POWDER, LYOPHILIZED, FOR SOLUTION INTRAVENOUS EVERY 12 HOURS SCHEDULED
Status: DISCONTINUED | OUTPATIENT
Start: 2022-10-06 | End: 2022-10-11

## 2022-10-06 RX ORDER — MAGNESIUM SULFATE HEPTAHYDRATE 40 MG/ML
4 INJECTION, SOLUTION INTRAVENOUS AS NEEDED
Status: DISCONTINUED | OUTPATIENT
Start: 2022-10-06 | End: 2022-10-12 | Stop reason: HOSPADM

## 2022-10-06 RX ORDER — MAGNESIUM SULFATE HEPTAHYDRATE 40 MG/ML
2 INJECTION, SOLUTION INTRAVENOUS AS NEEDED
Status: DISCONTINUED | OUTPATIENT
Start: 2022-10-06 | End: 2022-10-12 | Stop reason: HOSPADM

## 2022-10-06 RX ORDER — NICOTINE POLACRILEX 4 MG
15 LOZENGE BUCCAL
Status: DISCONTINUED | OUTPATIENT
Start: 2022-10-06 | End: 2022-10-12 | Stop reason: HOSPADM

## 2022-10-06 RX ORDER — MORPHINE SULFATE 2 MG/ML
2 INJECTION, SOLUTION INTRAMUSCULAR; INTRAVENOUS ONCE
Status: COMPLETED | OUTPATIENT
Start: 2022-10-06 | End: 2022-10-06

## 2022-10-06 RX ORDER — UBIDECARENONE 75 MG
100 CAPSULE ORAL DAILY
Status: DISCONTINUED | OUTPATIENT
Start: 2022-10-07 | End: 2022-10-12 | Stop reason: HOSPADM

## 2022-10-06 RX ORDER — GUAIFENESIN 600 MG/1
1200 TABLET, EXTENDED RELEASE ORAL 2 TIMES DAILY
Status: DISCONTINUED | OUTPATIENT
Start: 2022-10-06 | End: 2022-10-12 | Stop reason: HOSPADM

## 2022-10-06 RX ORDER — MAGNESIUM SULFATE 1 G/100ML
1 INJECTION INTRAVENOUS ONCE
Status: COMPLETED | OUTPATIENT
Start: 2022-10-06 | End: 2022-10-06

## 2022-10-06 RX ORDER — MINOCYCLINE HYDROCHLORIDE 50 MG/1
100 CAPSULE ORAL EVERY 12 HOURS SCHEDULED
Status: DISCONTINUED | OUTPATIENT
Start: 2022-10-06 | End: 2022-10-08

## 2022-10-06 RX ORDER — BISACODYL 10 MG
10 SUPPOSITORY, RECTAL RECTAL DAILY PRN
Status: DISCONTINUED | OUTPATIENT
Start: 2022-10-06 | End: 2022-10-12 | Stop reason: HOSPADM

## 2022-10-06 RX ORDER — INSULIN LISPRO 100 [IU]/ML
0-9 INJECTION, SOLUTION INTRAVENOUS; SUBCUTANEOUS
Status: DISCONTINUED | OUTPATIENT
Start: 2022-10-07 | End: 2022-10-12 | Stop reason: HOSPADM

## 2022-10-06 RX ORDER — AMOXICILLIN 250 MG
2 CAPSULE ORAL 2 TIMES DAILY
Status: DISCONTINUED | OUTPATIENT
Start: 2022-10-06 | End: 2022-10-12 | Stop reason: HOSPADM

## 2022-10-06 RX ORDER — L.ACID,PARA/B.BIFIDUM/S.THERM 8B CELL
1 CAPSULE ORAL DAILY
Status: DISCONTINUED | OUTPATIENT
Start: 2022-10-07 | End: 2022-10-12 | Stop reason: HOSPADM

## 2022-10-06 RX ORDER — MORPHINE SULFATE 2 MG/ML
2 INJECTION, SOLUTION INTRAMUSCULAR; INTRAVENOUS ONCE
Status: DISCONTINUED | OUTPATIENT
Start: 2022-10-06 | End: 2022-10-06

## 2022-10-06 RX ORDER — SODIUM CHLORIDE 0.9 % (FLUSH) 0.9 %
10 SYRINGE (ML) INJECTION AS NEEDED
Status: DISCONTINUED | OUTPATIENT
Start: 2022-10-06 | End: 2022-10-12 | Stop reason: HOSPADM

## 2022-10-06 RX ORDER — ONDANSETRON 2 MG/ML
4 INJECTION INTRAMUSCULAR; INTRAVENOUS EVERY 6 HOURS PRN
Status: DISCONTINUED | OUTPATIENT
Start: 2022-10-06 | End: 2022-10-12 | Stop reason: HOSPADM

## 2022-10-06 RX ORDER — BUMETANIDE 0.25 MG/ML
0.5 INJECTION INTRAMUSCULAR; INTRAVENOUS AS NEEDED
Status: ACTIVE | OUTPATIENT
Start: 2022-10-06 | End: 2022-10-07

## 2022-10-06 RX ORDER — DIAZEPAM 5 MG/ML
5 INJECTION, SOLUTION INTRAMUSCULAR; INTRAVENOUS ONCE
Status: COMPLETED | OUTPATIENT
Start: 2022-10-06 | End: 2022-10-06

## 2022-10-06 RX ORDER — SODIUM CHLORIDE 0.9 % (FLUSH) 0.9 %
10 SYRINGE (ML) INJECTION EVERY 12 HOURS SCHEDULED
Status: DISCONTINUED | OUTPATIENT
Start: 2022-10-06 | End: 2022-10-12 | Stop reason: HOSPADM

## 2022-10-06 RX ORDER — AMOXICILLIN AND CLAVULANATE POTASSIUM 875; 125 MG/1; MG/1
1 TABLET, FILM COATED ORAL EVERY 24 HOURS
Status: DISCONTINUED | OUTPATIENT
Start: 2022-10-06 | End: 2022-10-12 | Stop reason: HOSPADM

## 2022-10-06 RX ORDER — DEXTROSE MONOHYDRATE 25 G/50ML
25 INJECTION, SOLUTION INTRAVENOUS
Status: DISCONTINUED | OUTPATIENT
Start: 2022-10-06 | End: 2022-10-12 | Stop reason: HOSPADM

## 2022-10-06 RX ORDER — MAGNESIUM SULFATE 1 G/100ML
1 INJECTION INTRAVENOUS AS NEEDED
Status: DISCONTINUED | OUTPATIENT
Start: 2022-10-06 | End: 2022-10-12 | Stop reason: HOSPADM

## 2022-10-06 RX ORDER — DIPHENHYDRAMINE HCL 25 MG
25 CAPSULE ORAL ONCE
Status: COMPLETED | OUTPATIENT
Start: 2022-10-07 | End: 2022-10-07

## 2022-10-06 RX ADMIN — Medication 12.5 MG: at 22:00

## 2022-10-06 RX ADMIN — DIAZEPAM 5 MG: 5 INJECTION, SOLUTION INTRAMUSCULAR; INTRAVENOUS at 17:20

## 2022-10-06 RX ADMIN — PANTOPRAZOLE SODIUM 40 MG: 40 INJECTION, POWDER, FOR SOLUTION INTRAVENOUS at 22:00

## 2022-10-06 RX ADMIN — GUAIFENESIN 1200 MG: 600 TABLET, EXTENDED RELEASE ORAL at 22:00

## 2022-10-06 RX ADMIN — MINOCYCLINE HYDROCHLORIDE 100 MG: 50 CAPSULE ORAL at 22:52

## 2022-10-06 RX ADMIN — SENNOSIDES AND DOCUSATE SODIUM 2 TABLET: 50; 8.6 TABLET ORAL at 22:00

## 2022-10-06 RX ADMIN — MAGNESIUM SULFATE IN DEXTROSE 1 G: 10 INJECTION, SOLUTION INTRAVENOUS at 16:20

## 2022-10-06 RX ADMIN — MORPHINE SULFATE 2 MG: 2 INJECTION, SOLUTION INTRAMUSCULAR; INTRAVENOUS at 19:01

## 2022-10-06 RX ADMIN — SODIUM CHLORIDE 125 ML/HR: 9 INJECTION, SOLUTION INTRAVENOUS at 16:21

## 2022-10-06 RX ADMIN — AMOXICILLIN AND CLAVULANATE POTASSIUM 1 TABLET: 875; 125 TABLET, FILM COATED ORAL at 21:59

## 2022-10-06 RX ADMIN — Medication 10 ML: at 22:01

## 2022-10-06 NOTE — H&P
[facility]  HISTORY AND PHYSICAL    Patient Name: Susan Anderson  : 1940  MRN: [id number]  Primary Care Physician: Arleen Doherty MD  Date of admission: 10/6/2022    Subjective   Subjective     Chief Complaint:  Near Syncope/ nausea    HPI:  Susan Anderson is a 82 y.o. female with pmh significant for HTN, HLP, CAD, T2DM, cervical cancer, CKDIII followed by [facility], recent diagnosis of ICM/ HFpEF and Afib in 2022 on Eliquis followed by Dr. Ceballos, PVD/PAD with hospitalization - for worsening LLE infection/ osteomyelitis receiving mid SFA to LLE with KRISTY on  per Dr. Finney with chronic antibiotic therapy per Dr. GREGOR Pete started on plavix returns today after near syncopal event. Patient reports being home almost 1 week and doing fair until yesterday when she started becoming more weak and fatigued. Noticed some nausea, but eating ok. Today daughter noticed she was pale and patient felt nauseated and needed to have BM. She went to bathroom and had near syncopal event. She has not had a BM since  Hospitalization. + FOB and hbg 7.9 on arrival. Patient to be admitted for acute GIB        Review of Systems   Constitutional: Positive for activity change, appetite change and fatigue.   HENT: Negative.    Eyes: Negative.    Respiratory: Negative.    Cardiovascular: Negative.    Gastrointestinal: Positive for abdominal pain, constipation and nausea.   Endocrine: Negative.    Genitourinary: Negative.    Musculoskeletal: Positive for arthralgias and back pain.   Skin: Positive for pallor.   Neurological: Positive for weakness.   Psychiatric/Behavioral: Negative.         All other systems reviewed and are negative.     Personal History     Past Medical History:   Diagnosis Date   • Arthritis    • Asthma  - double pneumonia    Currently on inhaler and nebulizer   • Atrial fibrillation (HCC) 2022   • Cancer (HCC)     cervical cancer, skin cancer   •  CHF (congestive heart failure) (HCC) June 4, 2021   • Chronic kidney disease Related to diabetes   • Coronary artery disease 6/4/2021 DX for hear failure   • Diabetes mellitus (HCC) 30 years    Seeing Dr. Lam 1st time Aug 19   • Gout    • Hx of colonoscopy    • Hyperlipidemia Reference current labs x 2-3yrs approx   • Hypertension 30 years   • Migraine    • Mitral valve disease    • Mitral valve disease    • Osteomyelitis (HCC)    • Peripheral neuropathy    • Sleep apnea    • Type 2 diabetes mellitus (HCC)     30 years             Past Surgical History:   Procedure Laterality Date   • ABDOMINAL HYSTERECTOMY W/SALPINGECTOMY     • AMPUTATION  Right great toe, 1st 1/3 metatarsal -[facility] 4/14/21   • AORTAGRAM N/A 4/9/2021    Procedure: AORTAGRAM WITH OR WITHOUT RUNOFFS, WITH Co2;  Surgeon: Trey Forrest MD;  [facility];  Service: Vascular;  Laterality: N/A;   • AORTAGRAM N/A 9/28/2022    Procedure: CO2 ANGIOGRAM, LEFT SFA ANGIOPLASTY WITH DRUG ELUTING BALLOON, LEFT SFA STENT PLACEMENT ;  Surgeon: Trey Forrest MD;  [facility]  [facility];  Service: Vascular;  Laterality: N/A;  FLUORO: 13.12  DOSE 162mGy  CONTRAST: Isovue 350: 15ml   • APPENDECTOMY     • BRAIN TUMOR EXCISION      laser surgery    • CARDIAC CATHETERIZATION N/A 6/21/2021    Procedure: LEFT HEART CATH;  Surgeon: Anjum Ceballos MD;  Location:  [facility] CATH INVASIVE LOCATION;  Service: Cardiology;  Laterality: N/A;   • CATARACT EXTRACTION W/ INTRAOCULAR LENS  IMPLANT, BILATERAL     • CHOLECYSTECTOMY     • EYE SURGERY     • HYSTERECTOMY     • TOE SURGERY     • TRANS METATARSAL AMPUTATION Right 4/14/2021    Procedure: AMPUTATION TRANS METATARSAL RIGHT GREAT TOE;  Surgeon: Valdez Finney MD;  Location:  [facility] OR;  Service: Vascular;  Laterality: Right;       Family History:  family history includes Coronary artery disease in her father; Diabetes in her father, maternal grandfather, maternal grandmother, mother, paternal grandfather,  paternal grandmother, and sister; Heart attack in her father; Heart disease in her father. Otherwise pertinent FHx was reviewed and unremarkable.     Social History:  reports that she has never smoked. She has never used smokeless tobacco. She reports current alcohol use. She reports that she does not use drugs.  Social History     Social History Narrative    Lives in [place]        Medications:  Cholecalciferol, acetaminophen, amoxicillin-clavulanate, apixaban, aspirin, atorvastatin, bumetanide, clopidogrel, diphenhydrAMINE, famotidine, guaiFENesin, hydrALAZINE, insulin glargine, insulin lispro, lactobacillus acidophilus, metoprolol tartrate, ondansetron ODT, sennosides-docusate, and vitamin B-12    Allergies   Allergen Reactions   • Baclofen Other (See Comments) and Hallucinations     PSYCHOSIS-POA REFUSES ADMINISTRATION OF THIS MED.   • Cephalexin Nausea Only   • Erythromycin Base Nausea Only   • Melatonin Other (See Comments)     Nightmares   • Oxycodone Nausea Only   • Sulfa Antibiotics Nausea Only       Objective   Objective     Vital Signs:   Heart Rate:  [55-61] 61  Resp:  [16] 16  BP: (125-149)/(38-61) 125/38    Physical Exam   Constitutional: Awake, alert, appears ill and uncomfortable  Eyes: PERRLA, sclerae anicteric, no conjunctival injection  HENT: NCAT, mucous membranes pale  Neck: Supple, no thyromegaly, no lymphadenopathy, trachea midline  Respiratory: Clear to auscultation bilaterally, nonlabored respirations   Cardiovascular: RRR, no murmurs, rubs, or gallops, palpable pedal pulses bilaterally  Gastrointestinal: Positive bowel sounds, soft, nontender, nondistended  Musculoskeletal: No bilateral ankle edema, bandage present left foot  Psychiatric: Appropriate affect, cooperative  Neurologic: Oriented x 3, generally weak, speech clear  Skin: No rashes, pale; large resolving contusion at femoral puncture site right groin- no masses or warmth noted      Results Reviewed:  I have personally reviewed  most recent indicated data and agree with findings including:  [x]  Laboratory  [x]  Radiology  [x]  EKG/Telemetry  []  Pathology  []  Cardiac/Vascular Studies  [x]  Old records  []  Other:  Most pertinent findings include:      LAB RESULTS:      Lab 10/06/22  1450 09/30/22  1147   WBC 9.26 6.87   HEMOGLOBIN 7.9* 9.5*   HEMATOCRIT 23.8* 29.2*   PLATELETS 333 177   NEUTROS ABS 6.19  --    IMMATURE GRANS (ABS) 0.28*  --    LYMPHS ABS 1.82  --    MONOS ABS 0.75  --    EOS ABS 0.19  --    MCV 88.1 89.6         Lab 10/06/22  1450 09/30/22  1147   SODIUM 137 135*   POTASSIUM 3.9 4.8   CHLORIDE 98 100   CO2 25.0 27.0   ANION GAP 14.0 8.0   BUN 93* 39*   CREATININE 1.45* 1.19*   EGFR 35.9* 45.5*   GLUCOSE 170* 251*   CALCIUM 9.1 8.5*   MAGNESIUM 1.5*  --          Lab 10/06/22  1450   TOTAL PROTEIN 5.7*   ALBUMIN 2.90*   GLOBULIN 2.8   ALT (SGPT) 7   AST (SGOT) 17   BILIRUBIN 0.7   ALK PHOS 157*         Lab 10/06/22  1450   TROPONIN T 0.178*             Lab 10/06/22  1718   ABO TYPING A   RH TYPING Positive   ANTIBODY SCREEN Negative         Brief Urine Lab Results  (Last result in the past 365 days)      Color   Clarity   Blood   Leuk Est   Nitrite   Protein   CREAT   Urine HCG        10/06/22 1602 Yellow   Cloudy   Negative   Large (3+)   Negative   100 mg/dL (2+)               Microbiology Results (last 10 days)     Procedure Component Value - Date/Time    COVID PRE-OP / PRE-PROCEDURE SCREENING ORDER (NO ISOLATION) - Swab, Nasopharynx [[id number]]  (Normal) Collected: 09/27/22 1140    Lab Status: Final result Specimen: Swab from Nasopharynx Updated: 09/27/22 1157    Narrative:      The following orders were created for panel order COVID PRE-OP / PRE-PROCEDURE SCREENING ORDER (NO ISOLATION) - Swab, Nasopharynx.  Procedure                               Abnormality         Status                     ---------                               -----------         ------                     COVID-19, ABBOTT IN-HOUS...[[id number]]   Normal              Final result                 Please view results for these tests on the individual orders.    COVID-19, ABBOTT IN-HOUSE,NASAL Swab (NO TRANSPORT MEDIA) 2 HR TAT - Swab, Nasopharynx [[id number]]  (Normal) Collected: 09/27/22 1140    Lab Status: Final result Specimen: Swab from Nasopharynx Updated: 09/27/22 1157     COVID19 Presumptive Negative    Narrative:      Fact sheet for providers: https://www.fda.gov/media/486406/download     Fact sheet for patients: https://www.fda.gov/media/200853/download    Test performed by PCR.  If inconsistent with clinical signs and symptoms patient should be tested with different authorized molecular test.          CT Abdomen Pelvis Without Contrast    Result Date: 10/6/2022  DATE OF EXAM: 10/6/2022 3:02 PM  PROCEDURE: CT ABDOMEN PELVIS WO CONTRAST-  INDICATIONS: abd pain nv  COMPARISON: CT abdomen and pelvis 9/21/2022  TECHNIQUE: Routine transaxial slices were obtained through the abdomen and pelvis without the administration of intravenous contrast. Reconstructed coronal and sagittal images were also obtained. Automated exposure control and iterative construction methods were used.  The radiation dose reduction device was turned on for each scan per the ALARA (As Low as Reasonably Achievable) protocol.  FINDINGS: Lung bases are grossly clear with similar peripheral reticulations/chronic interstitial changes. Unremarkable appearance of the liver. The gallbladder is surgically absent. Normal appearance of the bile ducts. Unremarkable appearance of the spleen and pancreas. Normal appearance of the adrenal glands, kidneys, ureters, and bladder. The uterus is surgically absent. There is some inspissated stool in the rectum. No evidence of bowel obstruction or definite inflammatory change of the GI tract. No abdominopelvic free fluid or conspicuous fat stranding. No pneumoperitoneum. Scattered air within the subcutaneous tissues of the anterior mid abdomen, nonspecific,  possibly reflecting sites of injection. There is atherosclerosis without aneurysm. No lymphadenopathy. No acute osseous findings.      Impression: No significant interval change. No acute abdominopelvic findings.  This report was finalized on 10/6/2022 3:29 PM by Kings Baez MD.      CT Head Without Contrast    Result Date: 10/6/2022  DATE OF EXAM: 10/6/2022 3:02 PM  PROCEDURE: CT HEAD WO CONTRAST-  INDICATIONS: syncope  COMPARISON: 7/26/2022  TECHNIQUE: Routine transaxial and coronal reconstruction images were obtained through the head without the administration of contrast. Automated exposure control and iterative reconstruction methods were used.  The radiation dose reduction device was turned on for each scan per the ALARA (As Low as Reasonably Achievable) protocol.  FINDINGS: Gray-white differentiation is maintained and there is no evidence of intracranial hemorrhage, mass or mass effect. Age-related changes of the brain are present including volume loss and typical periventricular sequela of chronic small vessel ischemia. There is otherwise no evidence of intracranial hemorrhage, mass or mass effect. The ventricles are normal in size and configuration accounting for surrounding volume loss. The orbits are normal and the paranasal sinuses are grossly clear.      Impression: Age-related changes of the brain as above, otherwise without evidence of acute intracranial abnormality.   This report was finalized on 10/6/2022 3:45 PM by Babatunde Dinero.      XR Chest 1 View    Result Date: 10/6/2022  DATE OF EXAM: 10/6/2022 4:02 PM  PROCEDURE: XR CHEST 1 VW-  INDICATIONS: Syncope.  COMPARISON: 08/26/2022.  TECHNIQUE: Single radiographic view of the chest was obtained.  FINDINGS: The heart size is normal. The pulmonary vascular markings are normal. The lungs and pleural spaces are clear of active disease.  There are chronic age-related changes involving the bony thorax and thoracic aorta.      Impression: No active  disease.  This report was finalized on 10/6/2022 4:13 PM by Juliano Burgos MD.        Results for orders placed during the hospital encounter of 08/21/22    Adult Transthoracic Echo Complete W/ Cont if Necessary Per Protocol    Interpretation Summary  · Estimated left ventricular EF = 55%  · Mild to moderate mitral valve regurgitation is present.      Assessment & Plan   Assessment & Plan       GIB (gastrointestinal bleeding)    Diabetic foot ulcer (HCC)    PVD (peripheral vascular disease) (HCC)    Diabetes mellitus with neuropathy (HCC)    Essential hypertension    Stage 3a chronic kidney disease (HCC)    NICM (nonischemic cardiomyopathy) (HCC)    Coronary artery disease involving native coronary artery of native heart without angina pectoris    Chronic combined systolic and diastolic heart failure (HCC)    Degeneration of lumbar or lumbosacral intervertebral disc    Physical deconditioning    Anemia, chronic disease    Elevated troponin    Atrial fibrillation on Eliquis    Sleep apnea    Vasovagal syncope    Hypomagnesemia      GIB  Near Syncope  Nausea  -- CT a/p without acute finding  -- CT head- only age related findings  -- hgb 7.9 down from 11.2 on 9/29  -- + FOB  -- hold eliquis, plavix asa for now  -- BID IV protonix  -- GI consult for EGD, NPO after MN  -- zofran as needed    Symptomatic anemia  -- hgb 7.9, will give 1 unit PRBC  -- 0.5 bumex after transfusion  -- recheck hgb    PAD/PVD  Left foot ulcer/ infection, Osteomyelitis  -- 9/28 mid SFA LLE with KRISTY per Dr. Finney  -- asa/ plavix on hold  -- consult Dr. Finney for further recs  -- continue oral antibiotics per ID (augmentin/ minocycline). Has appt with Dr. GREGOR Pete tomorrow. Will consult  -- WOC consult  -- PT/OT consult    PAF  ICM/ HFpEF  -- new as of August. Follows with Dr. Ceballos  -- hold eliquis and ask Dr. Ceballos to see  -- continue metoprolol and statin    HTN  -- hold Hydralazine and monitor    T2DM  -- hold home regimen  -- M SSI for  now  -- A1c 7.8    CKDIII  -- followed by [facility]  -- SeCr baseline ~ 1.3-1.4, currently stable at baseline    Elevated troponin  -- chronic elevation in setting of CKD, severe PVD and ICM  -- lower than previous    Hypomagnesemia  -- replace      DVT prophylaxis:  Holding 2/2 GIB    CODE STATUS:    Medical Intervention Limits: NO intubation (DNI); NO cardioversion  Level Of Support Discussed With: Patient  Code Status (Patient has no pulse and is not breathing): No CPR (Do Not Attempt to Resuscitate)  Medical Interventions (Patient has pulse or is breathing): Limited Support      This note has been completed as part of a split-shared workflow.   Electronically signed by INDIGO Fernandez, 10/06/22, 6:35 PM EDT.        Attending   Admission Attestation       I have performed an independent face-to-face diagnostic evaluation including performing an independent physical examination as documented here.  The documented plan of care above was reviewed and developed with the advanced practice clinician (APC).      Brief Summary Statement:   Susan Anderson is a 82 y.o. female who was recently admitted here and treated for left lower extremity cellulitis/osteomyelitis; she has been home for approximately 1 week but yesterday (Wednesday) had a near syncopal episode in the afternoon, and experiencing increased generalized weakness, fatigue, and malaise.  She also had some transient nausea, but did not suffer any emesis.  Her daughter accompanies her in the room and states that she noticed today that the patient appeared very pale, and the patient continued to c/o continued nausea.  The patient went to have a bowel movement and had another near syncopal event.  This prompted the patient to seek further evaluation in the ED here today.  Work-up included fecal occult blood testing which was positive, and a hemoglobin level of 7.9 which was almost 10 as of 6 days ago.    Remainder of detailed HPI is as noted by APC and has been  reviewed and/or edited by me for completeness.    Attending Physical Exam:  Temp:  [96.8 °F (36 °C)-98.9 °F (37.2 °C)] 96.8 °F (36 °C)  Heart Rate:  [55-69] 69  Resp:  [16-18] 18  BP: (105-150)/(38-62) 139/54  Flow (L/min):  [1] 1    Constitutional: Awake, alert, NAD.  Eyes: PERRLA, sclerae anicteric, no conjunctival injection  HENT: NCAT, mucous membranes dry  Neck: Supple, no thyromegaly, no lymphadenopathy, trachea midline  Respiratory: Clear to auscultation bilaterally, nonlabored respirations   Cardiovascular: RRR, no murmurs, rubs, or gallops, palpable pedal pulses bilaterally  Gastrointestinal: Positive bowel sounds, soft, nontender, nondistended  Musculoskeletal: No bilateral ankle edema, no clubbing or cyanosis to extremities, did not take down LLE dressing  Psychiatric: Appropriate affect, cooperative  Neurologic: Oriented x 3, strength symmetric in all extremities, Cranial Nerves grossly intact to confrontation, speech clear  Skin: No rashes, facial pallor.  No sign of hematoma at previous right femoral puncture site.    Brief Assessment/Plan :  See detailed assessment and plan developed with APC which I have reviewed and/or edited for completeness.        Admission Status: I believe that this patient meets inpatient criteria due to need for PRBC infusion due to GI bleed.  Will also require IV Protonix, visitation by the GI service, serial hemoglobin monitoring.  For these reasons I feel her care will extend over 2 midnights.      Jason Garcia,LOLY, DO  10/06/22

## 2022-10-06 NOTE — ED PROVIDER NOTES
Subjective   History of Present Illness  Patient presents with nausea vomiting and syncope while on the toilet the lasted for several seconds.  Patient arrives by EMS.  Reportedly witnessed by the daughter.  Patient tells me she felt bad outside like she had to go use the bathroom and when she was using the bathroom she passed out.  Denies any trauma.  She is having some abdominal pain no chest pain no difficulty breathing.    Syncope  Episode history:  Single  Most recent episode:  Today  Timing:  Constant  Progression:  Resolved  Context: bowel movement    Witnessed: yes    Relieved by:  Nothing  Worsened by:  Nothing  Associated symptoms: nausea and vomiting    Associated symptoms: no chest pain, no diaphoresis, no fever and no shortness of breath        Review of Systems   Constitutional: Negative for chills, diaphoresis and fever.   HENT: Negative for congestion and sore throat.    Respiratory: Negative for cough, choking, chest tightness, shortness of breath and wheezing.    Cardiovascular: Positive for syncope. Negative for chest pain and leg swelling.   Gastrointestinal: Positive for nausea and vomiting. Negative for abdominal distention, abdominal pain, anal bleeding, blood in stool, constipation and diarrhea.   Genitourinary: Negative for difficulty urinating, dysuria, flank pain, frequency, hematuria and urgency.   All other systems reviewed and are negative.      Past Medical History:   Diagnosis Date   • Arthritis    • Asthma 6/4 - double pneumonia    Currently on inhaler and nebulizer   • Atrial fibrillation (HCC) 8/21/2022   • Cancer (HCC)     cervical cancer, skin cancer   • CHF (congestive heart failure) ([facility]) June 4, 2021   • Chronic kidney disease Related to diabetes   • Coronary artery disease 6/4/2021 DX for hear failure   • Diabetes mellitus (HCC) 30 years    Seeing Dr. Lam 1st time Aug 19   • Gout    • Hx of colonoscopy    • Hyperlipidemia Reference current labs x 2-3yrs approx   •  Hypertension 30 years   • Migraine    • Mitral valve disease    • Mitral valve disease    • Osteomyelitis (HCC)    • Peripheral neuropathy    • Sleep apnea    • Type 2 diabetes mellitus (HCC)     30 years       Allergies   Allergen Reactions   • Baclofen Other (See Comments) and Hallucinations     PSYCHOSIS-POA REFUSES ADMINISTRATION OF THIS MED.   • Cephalexin Nausea Only   • Erythromycin Base Nausea Only   • Melatonin Other (See Comments)     Nightmares   • Oxycodone Nausea Only   • Sulfa Antibiotics Nausea Only       Past Surgical History:   Procedure Laterality Date   • ABDOMINAL HYSTERECTOMY W/SALPINGECTOMY     • AMPUTATION  Right great toe, 1st 1/3 metatarsal -Reg 4/14/21   • AORTAGRAM N/A 4/9/2021    Procedure: AORTAGRAM WITH OR WITHOUT RUNOFFS, WITH Co2;  Surgeon: Trey Forrest MD;  Location:  [facility];  Service: Vascular;  Laterality: N/A;   • AORTAGRAM N/A 9/28/2022    Procedure: CO2 ANGIOGRAM, LEFT SFA ANGIOPLASTY WITH DRUG ELUTING BALLOON, LEFT SFA STENT PLACEMENT ;  Surgeon: Trey Forrest MD;  Location:  [facility];  Service: Vascular;  Laterality: N/A;  FLUORO: 13.12  DOSE 162mGy  CONTRAST: Isovue 350: 15ml   • APPENDECTOMY     • BRAIN TUMOR EXCISION      laser surgery    • CARDIAC CATHETERIZATION N/A 6/21/2021    Procedure: LEFT HEART CATH;  Surgeon: Anjum Ceballos MD;  Location:  [facility];  Service: Cardiology;  Laterality: N/A;   • CATARACT EXTRACTION W/ INTRAOCULAR LENS  IMPLANT, BILATERAL     • CHOLECYSTECTOMY     • EYE SURGERY     • HYSTERECTOMY     • TOE SURGERY     • TRANS METATARSAL AMPUTATION Right 4/14/2021    Procedure: AMPUTATION TRANS METATARSAL RIGHT GREAT TOE;  Surgeon: Valdez Finney MD;  Location:  [facility];  Service: Vascular;  Laterality: Right;       Family History   Problem Relation Age of Onset   • Diabetes Mother         Type II   • Heart disease Father    • Heart attack Father    • Coronary artery disease Father    • Diabetes Father          Type II   • Diabetes Sister    • Diabetes Maternal Grandmother    • Diabetes Maternal Grandfather    • Diabetes Paternal Grandmother    • Diabetes Paternal Grandfather        Social History     Socioeconomic History   • Marital status:    • Number of children: 1   Tobacco Use   • Smoking status: Never Smoker   • Smokeless tobacco: Never Used   Vaping Use   • Vaping Use: Never used   Substance and Sexual Activity   • Alcohol use: Yes     Comment: Social drinking when not recovering from hospitalization   • Drug use: Never   • Sexual activity: Not Currently     Birth control/protection: None           Objective   Physical Exam  Constitutional:       Appearance: She is well-developed. She is ill-appearing.      Comments: Frail weak elderly.   HENT:      Head: Normocephalic and atraumatic.      Right Ear: External ear normal.      Left Ear: External ear normal.      Nose: Nose normal.   Eyes:      Conjunctiva/sclera: Conjunctivae normal.      Pupils: Pupils are equal, round, and reactive to light.   Cardiovascular:      Rate and Rhythm: Normal rate and regular rhythm.      Heart sounds: Normal heart sounds.   Pulmonary:      Effort: Pulmonary effort is normal.      Breath sounds: Normal breath sounds.   Abdominal:      General: Bowel sounds are normal.      Palpations: Abdomen is soft.   Musculoskeletal:         General: Normal range of motion.      Cervical back: Normal range of motion and neck supple.   Skin:     General: Skin is warm and dry.   Neurological:      Mental Status: She is alert and oriented to person, place, and time.   Psychiatric:         Behavior: Behavior normal.         Thought Content: Thought content normal.         Judgment: Judgment normal.         Procedures           ED Course  ED Course as of 10/06/22 1737   Thu Oct 06, 2022   1549 Awaiting UA, stool guaiac  [JM]      ED Course User Index  [VERONICA] Trey Solitario APRN                 XR Chest 1 View   Final Result   No active disease.        This report was finalized on 10/6/2022 4:13 PM by Juliano Burgos MD.          CT Head Without Contrast   Final Result   Age-related changes of the brain as above, otherwise without   evidence of acute intracranial abnormality.           This report was finalized on 10/6/2022 3:45 PM by Babatunde Dinero.          CT Abdomen Pelvis Without Contrast   Final Result   No significant interval change. No acute abdominopelvic findings.       This report was finalized on 10/6/2022 3:29 PM by Kings Baez MD.                                         [facility]    Final diagnoses:   Syncope, unspecified syncope type   Anemia, unspecified type   LACEY (acute kidney injury) (HCC)   Elevated troponin   Anticoagulated       ED Disposition  ED Disposition     ED Disposition   Decision to Admit    Condition   --    Comment   --             No follow-up provider specified.       Medication List      No changes were made to your prescriptions during this visit.          Trey Solitario, APRN  10/06/22 1730

## 2022-10-07 ENCOUNTER — APPOINTMENT (OUTPATIENT)
Dept: PHYSICAL THERAPY | Facility: HOSPITAL | Age: 82
End: 2022-10-07

## 2022-10-07 ENCOUNTER — ANESTHESIA (OUTPATIENT)
Dept: GASTROENTEROLOGY | Facility: HOSPITAL | Age: 82
End: 2022-10-07

## 2022-10-07 ENCOUNTER — READMISSION MANAGEMENT (OUTPATIENT)
Dept: CALL CENTER | Facility: HOSPITAL | Age: 82
End: 2022-10-07

## 2022-10-07 ENCOUNTER — ANESTHESIA EVENT (OUTPATIENT)
Dept: GASTROENTEROLOGY | Facility: HOSPITAL | Age: 82
End: 2022-10-07

## 2022-10-07 PROBLEM — I24.8 DEMAND ISCHEMIA (HCC): Status: ACTIVE | Noted: 2022-07-27

## 2022-10-07 PROBLEM — I48.0 PAROXYSMAL ATRIAL FIBRILLATION (HCC): Status: ACTIVE | Noted: 2022-10-07

## 2022-10-07 PROBLEM — D33.3 ACOUSTIC NEUROMA: Status: RESOLVED | Noted: 2022-07-27 | Resolved: 2022-10-07

## 2022-10-07 PROBLEM — I24.89 DEMAND ISCHEMIA: Status: ACTIVE | Noted: 2022-07-27

## 2022-10-07 LAB
ALBUMIN SERPL-MCNC: 2.5 G/DL (ref 3.5–5.2)
ALBUMIN/GLOB SERPL: 1.3 G/DL
ALP SERPL-CCNC: 145 U/L (ref 39–117)
ALT SERPL W P-5'-P-CCNC: 6 U/L (ref 1–33)
ANION GAP SERPL CALCULATED.3IONS-SCNC: 9 MMOL/L (ref 5–15)
AST SERPL-CCNC: 17 U/L (ref 1–32)
BACTERIA SPEC AEROBE CULT: NO GROWTH
BASOPHILS # BLD AUTO: 0.03 10*3/MM3 (ref 0–0.2)
BASOPHILS NFR BLD AUTO: 0.4 % (ref 0–1.5)
BILIRUB SERPL-MCNC: 0.7 MG/DL (ref 0–1.2)
BUN SERPL-MCNC: 82 MG/DL (ref 8–23)
BUN/CREAT SERPL: 68.3 (ref 7–25)
C3 FRG RBC-MCNC: NORMAL
CALCIUM SPEC-SCNC: 8.2 MG/DL (ref 8.6–10.5)
CHLORIDE SERPL-SCNC: 105 MMOL/L (ref 98–107)
CO2 SERPL-SCNC: 26 MMOL/L (ref 22–29)
CREAT SERPL-MCNC: 1.2 MG/DL (ref 0.57–1)
DEPRECATED RDW RBC AUTO: 59 FL (ref 37–54)
EGFRCR SERPLBLD CKD-EPI 2021: 45 ML/MIN/1.73
EOSINOPHIL # BLD AUTO: 0.22 10*3/MM3 (ref 0–0.4)
EOSINOPHIL NFR BLD AUTO: 3.1 % (ref 0.3–6.2)
ERYTHROCYTE [DISTWIDTH] IN BLOOD BY AUTOMATED COUNT: 18.4 % (ref 12.3–15.4)
GLOBULIN UR ELPH-MCNC: 2 GM/DL
GLUCOSE BLDC GLUCOMTR-MCNC: 80 MG/DL (ref 70–130)
GLUCOSE BLDC GLUCOMTR-MCNC: 84 MG/DL (ref 70–130)
GLUCOSE BLDC GLUCOMTR-MCNC: 99 MG/DL (ref 70–130)
GLUCOSE SERPL-MCNC: 77 MG/DL (ref 65–99)
HCT VFR BLD AUTO: 20.7 % (ref 34–46.6)
HCT VFR BLD AUTO: 28.3 % (ref 34–46.6)
HGB BLD-MCNC: 6.8 G/DL (ref 12–15.9)
HGB BLD-MCNC: 9.7 G/DL (ref 12–15.9)
IMM GRANULOCYTES # BLD AUTO: 0.24 10*3/MM3 (ref 0–0.05)
IMM GRANULOCYTES NFR BLD AUTO: 3.4 % (ref 0–0.5)
LARGE PLATELETS: NORMAL
LYMPHOCYTES # BLD AUTO: 1.39 10*3/MM3 (ref 0.7–3.1)
LYMPHOCYTES NFR BLD AUTO: 19.6 % (ref 19.6–45.3)
MCH RBC QN AUTO: 29.3 PG (ref 26.6–33)
MCHC RBC AUTO-ENTMCNC: 32.9 G/DL (ref 31.5–35.7)
MCV RBC AUTO: 89.2 FL (ref 79–97)
MONOCYTES # BLD AUTO: 0.57 10*3/MM3 (ref 0.1–0.9)
MONOCYTES NFR BLD AUTO: 8 % (ref 5–12)
NEUTROPHILS NFR BLD AUTO: 4.65 10*3/MM3 (ref 1.7–7)
NEUTROPHILS NFR BLD AUTO: 65.5 % (ref 42.7–76)
NRBC BLD AUTO-RTO: 0.4 /100 WBC (ref 0–0.2)
OVALOCYTES BLD QL SMEAR: NORMAL
PLATELET # BLD AUTO: 250 10*3/MM3 (ref 140–450)
PMV BLD AUTO: 10.3 FL (ref 6–12)
POTASSIUM SERPL-SCNC: 3.6 MMOL/L (ref 3.5–5.2)
PROT SERPL-MCNC: 4.5 G/DL (ref 6–8.5)
RBC # BLD AUTO: 2.32 10*6/MM3 (ref 3.77–5.28)
SODIUM SERPL-SCNC: 140 MMOL/L (ref 136–145)
WBC MORPH BLD: NORMAL
WBC NRBC COR # BLD: 7.1 10*3/MM3 (ref 3.4–10.8)

## 2022-10-07 PROCEDURE — 99222 1ST HOSP IP/OBS MODERATE 55: CPT | Performed by: INTERNAL MEDICINE

## 2022-10-07 PROCEDURE — G0378 HOSPITAL OBSERVATION PER HR: HCPCS

## 2022-10-07 PROCEDURE — A9270 NON-COVERED ITEM OR SERVICE: HCPCS | Performed by: NURSE PRACTITIONER

## 2022-10-07 PROCEDURE — 99214 OFFICE O/P EST MOD 30 MIN: CPT | Performed by: INTERNAL MEDICINE

## 2022-10-07 PROCEDURE — 82962 GLUCOSE BLOOD TEST: CPT

## 2022-10-07 PROCEDURE — 86900 BLOOD TYPING SEROLOGIC ABO: CPT

## 2022-10-07 PROCEDURE — 85014 HEMATOCRIT: CPT | Performed by: INTERNAL MEDICINE

## 2022-10-07 PROCEDURE — 96361 HYDRATE IV INFUSION ADD-ON: CPT

## 2022-10-07 PROCEDURE — 63710000001 HYDROCODONE-ACETAMINOPHEN 5-325 MG TABLET: Performed by: NURSE PRACTITIONER

## 2022-10-07 PROCEDURE — 88305 TISSUE EXAM BY PATHOLOGIST: CPT | Performed by: INTERNAL MEDICINE

## 2022-10-07 PROCEDURE — 99232 SBSQ HOSP IP/OBS MODERATE 35: CPT | Performed by: INTERNAL MEDICINE

## 2022-10-07 PROCEDURE — 43239 EGD BIOPSY SINGLE/MULTIPLE: CPT | Performed by: INTERNAL MEDICINE

## 2022-10-07 PROCEDURE — 80053 COMPREHEN METABOLIC PANEL: CPT | Performed by: NURSE PRACTITIONER

## 2022-10-07 PROCEDURE — 25010000002 PROPOFOL 10 MG/ML EMULSION: Performed by: NURSE ANESTHETIST, CERTIFIED REGISTERED

## 2022-10-07 PROCEDURE — 85025 COMPLETE CBC W/AUTO DIFF WBC: CPT | Performed by: NURSE PRACTITIONER

## 2022-10-07 PROCEDURE — 63710000001 GUAIFENESIN 600 MG TABLET SUSTAINED-RELEASE 12 HOUR: Performed by: NURSE PRACTITIONER

## 2022-10-07 PROCEDURE — 63710000001 MINOCYCLINE 50 MG CAPSULE: Performed by: NURSE PRACTITIONER

## 2022-10-07 PROCEDURE — 36430 TRANSFUSION BLD/BLD COMPNT: CPT

## 2022-10-07 PROCEDURE — 63710000001 DIPHENHYDRAMINE PER 50 MG: Performed by: NURSE PRACTITIONER

## 2022-10-07 PROCEDURE — 85007 BL SMEAR W/DIFF WBC COUNT: CPT | Performed by: NURSE PRACTITIONER

## 2022-10-07 PROCEDURE — 63710000001 METOPROLOL TARTRATE 12.5 MG TABLET: Performed by: NURSE PRACTITIONER

## 2022-10-07 PROCEDURE — 63710000001 SENNOSIDES-DOCUSATE 8.6-50 MG TABLET: Performed by: NURSE PRACTITIONER

## 2022-10-07 PROCEDURE — 88342 IMHCHEM/IMCYTCHM 1ST ANTB: CPT | Performed by: INTERNAL MEDICINE

## 2022-10-07 PROCEDURE — 63710000001 AMOXICILLIN-CLAVULANATE 875-125 MG TABLET: Performed by: NURSE PRACTITIONER

## 2022-10-07 PROCEDURE — P9016 RBC LEUKOCYTES REDUCED: HCPCS

## 2022-10-07 PROCEDURE — 96376 TX/PRO/DX INJ SAME DRUG ADON: CPT

## 2022-10-07 PROCEDURE — 85018 HEMOGLOBIN: CPT | Performed by: INTERNAL MEDICINE

## 2022-10-07 RX ORDER — HYDROCODONE BITARTRATE AND ACETAMINOPHEN 5; 325 MG/1; MG/1
1 TABLET ORAL EVERY 6 HOURS PRN
Status: DISCONTINUED | OUTPATIENT
Start: 2022-10-07 | End: 2022-10-12 | Stop reason: HOSPADM

## 2022-10-07 RX ORDER — SODIUM CHLORIDE 9 MG/ML
INJECTION, SOLUTION INTRAVENOUS CONTINUOUS PRN
Status: DISCONTINUED | OUTPATIENT
Start: 2022-10-07 | End: 2022-10-07 | Stop reason: SURG

## 2022-10-07 RX ORDER — FENTANYL CITRATE 50 UG/ML
50 INJECTION, SOLUTION INTRAMUSCULAR; INTRAVENOUS
Status: DISCONTINUED | OUTPATIENT
Start: 2022-10-07 | End: 2022-10-07 | Stop reason: HOSPADM

## 2022-10-07 RX ORDER — HYDROMORPHONE HYDROCHLORIDE 1 MG/ML
0.5 INJECTION, SOLUTION INTRAMUSCULAR; INTRAVENOUS; SUBCUTANEOUS
Status: DISCONTINUED | OUTPATIENT
Start: 2022-10-07 | End: 2022-10-07 | Stop reason: HOSPADM

## 2022-10-07 RX ORDER — ONDANSETRON 2 MG/ML
4 INJECTION INTRAMUSCULAR; INTRAVENOUS ONCE AS NEEDED
Status: DISCONTINUED | OUTPATIENT
Start: 2022-10-07 | End: 2022-10-07 | Stop reason: HOSPADM

## 2022-10-07 RX ORDER — PROPOFOL 10 MG/ML
VIAL (ML) INTRAVENOUS AS NEEDED
Status: DISCONTINUED | OUTPATIENT
Start: 2022-10-07 | End: 2022-10-07 | Stop reason: SURG

## 2022-10-07 RX ADMIN — SODIUM CHLORIDE: 9 INJECTION, SOLUTION INTRAVENOUS at 16:54

## 2022-10-07 RX ADMIN — Medication 12.5 MG: at 21:59

## 2022-10-07 RX ADMIN — PANTOPRAZOLE SODIUM 40 MG: 40 INJECTION, POWDER, FOR SOLUTION INTRAVENOUS at 10:07

## 2022-10-07 RX ADMIN — GUAIFENESIN 1200 MG: 600 TABLET, EXTENDED RELEASE ORAL at 21:59

## 2022-10-07 RX ADMIN — MINOCYCLINE HYDROCHLORIDE 100 MG: 50 CAPSULE ORAL at 22:31

## 2022-10-07 RX ADMIN — SODIUM CHLORIDE 125 ML/HR: 9 INJECTION, SOLUTION INTRAVENOUS at 22:00

## 2022-10-07 RX ADMIN — PROPOFOL 20 MG: 10 INJECTION, EMULSION INTRAVENOUS at 17:01

## 2022-10-07 RX ADMIN — Medication 10 ML: at 21:59

## 2022-10-07 RX ADMIN — PROPOFOL 20 MG: 10 INJECTION, EMULSION INTRAVENOUS at 16:58

## 2022-10-07 RX ADMIN — PROPOFOL 30 MG: 10 INJECTION, EMULSION INTRAVENOUS at 16:54

## 2022-10-07 RX ADMIN — AMOXICILLIN AND CLAVULANATE POTASSIUM 1 TABLET: 875; 125 TABLET, FILM COATED ORAL at 22:30

## 2022-10-07 RX ADMIN — PANTOPRAZOLE SODIUM 40 MG: 40 INJECTION, POWDER, FOR SOLUTION INTRAVENOUS at 22:00

## 2022-10-07 RX ADMIN — DIPHENHYDRAMINE HYDROCHLORIDE 25 MG: 25 CAPSULE ORAL at 00:03

## 2022-10-07 RX ADMIN — SODIUM CHLORIDE 125 ML/HR: 9 INJECTION, SOLUTION INTRAVENOUS at 09:25

## 2022-10-07 RX ADMIN — HYDROCODONE BITARTRATE AND ACETAMINOPHEN 1 TABLET: 5; 325 TABLET ORAL at 22:26

## 2022-10-07 RX ADMIN — SENNOSIDES AND DOCUSATE SODIUM 2 TABLET: 50; 8.6 TABLET ORAL at 21:59

## 2022-10-07 RX ADMIN — SODIUM CHLORIDE 125 ML/HR: 9 INJECTION, SOLUTION INTRAVENOUS at 00:46

## 2022-10-07 NOTE — PROGRESS NOTES
"                    Clinical Nutrition       Patient Name: Susan Anderson  YOB: 1940  MRN: [id number]  Date of Encounter: 10/07/22 18:21 EDT  Admission date: 10/6/2022      Reason for Visit   Identified at risk by screening criteria, MST score 2+, wound      EMR  Reviewed   Yes    Height: Height: 160 cm (63\")  Weight: Weight: 71.2 kg (157 lb) (10/06/22 1800)  BMI: BMI (Calculated): 27.8    Problem:    GIB (gastrointestinal bleeding)    Diabetic foot ulcer (HCC)    PVD (peripheral vascular disease) (HCC)    Diabetes mellitus with neuropathy (HCC)    Essential hypertension    Stage 3a chronic kidney disease (HCC)    NICM (nonischemic cardiomyopathy) (HCC)    Coronary artery disease involving native coronary artery of native heart without angina pectoris    Dyslipidemia    Chronic combined systolic and diastolic heart failure (HCC)    Degeneration of lumbar or lumbosacral intervertebral disc    Anemia, chronic disease    Demand ischemia (HCC)    Sleep apnea    Vasovagal syncope    Paroxysmal atrial fibrillation (HCC)       Reported/Observed/Food/Nutrition Related - Comments   Spoke w/ RN as pt is in Endo for scope, pt has returned and is not able to be interviewed at this time. She does not appear to have had a 51 lb wt loss as her EHR reflects. I have discussed this w/ her RN Aditi and she will try to get a new weight when possible.      Current Nutrition Prescription     NPO Diet NPO Type: Strict NPO  No active supplement orders      Average Intake from Charting:     Nutrition Diagnosis     10/7  Problem Altered GI function   Etiology GIB   Signs/Symptoms NPO s/p endoscpoy   Status: ongpoing    Actions     Follow treatment progress, Care plan reviewed, Await begin PO   Weight requested    Monitor Per Protocol      Jovanna Johnson, MS,RD,LD,   Time Spent: 15 mins        "

## 2022-10-07 NOTE — NURSING NOTE
"Reason for Wound, Ostomy and Continence (WOC) Nursing Consultation: \"As previous\"    Patient appears confused.  Family/support person at bedside (daughter).      Wounds noted by WOC: Chronic left anterior foot wound, possible right medial foot bruise versus DTPI present on admission.    1.Wound Assessment  Wound Type: Arterial Ulcer  Location: Left anterior foot  Measurements: 2.8 x 2.5 x 0.15 cm.  Wound Bed: dry and slough   Wound Edges: Open  Periwound Skin: intact, no redness  Drainage Characteristics/Odor: bleeding controlled  Drainage Amount: scant  Pain: No   Care provided: Normal saline dampened Hydrofera Blue to wound bed, mild amount of hydrogel underneath to promote a little bit more moisture as wound was dry.  Small amount of Tina applied over red linear area, silicone foam dressing  Notes: Patient being followed by PT wound care as outpatient, reports continue the dressings that they have started which has showed great improvement in wound  Wound Image:       2.Wound Assessment  Wound Type: Pressure Injury Deep Tissue Pressure Injury (DTPI) versus bruise, present on admission  Location: Right medial foot  Measurements: 0.5 x 0.5 x 0  Wound Bed: non-blanchable and purple  Care provided: Padded with silicone foam dressing  Wound Image:             Recommendation(s) for management of wound:   -Refer to wound care orders for specific instructions on how to treat/manage wound.  -Every other dressing change to left anterior foot per order by nursing staff  -Pad right foot bruise versus DTPI  -Heel boots at all times!  These were requested to be placed  -Practice pressure injury prevention protocol.    Most recent Malachi Scale score:  Sensory Perception: 3-->slightly limited  Moisture: 3-->occasionally moist  Activity: 3-->walks occasionally  Mobility: 2-->very limited  Nutrition: 2-->probably inadequate  Friction and Shear: 3-->no apparent problem  Malachi Score: 16 (10/07/22 0830)   Specialty support " surface: Foam Mattress with Waffle Overlay       Pressure Injury Prevention Protocol (initiate for Malachi Score of 18 or less):   *Keep skin dry, turn q 2 hr, keep heels elevated and offloaded with offloading heel boots.    *Apply z-guard to bottom and bony prominences daily and as needed with incontinence episodes.  *Follow C.A.R.E protocol if medical devices (Bipap, nuñez, Ng tube, etc) are being used.     WOC Team will continue to follow.  Please re consult if the wound(s) worsens.

## 2022-10-07 NOTE — CONSULTS
[facility] In-Patient Consult    Name:  Susan Anderson  MRN Number:  [id number]  Date of Admission:  10/6/2022  Date of Consultation: 10/7/2022    Consulting Provider:    PCP: Arleen Doherty MD  IP Care Team:  Patient Care Team:  Arleen Doherty MD as PCP - General (Internal Medicine)  Flory Sauer APRN (Nurse Practitioner)  Janeth Lam MD as Consulting Physician (Endocrinology)  Anjum Ceballos MD as Consulting Physician (Cardiology)    Reason for Consultation: Nonhealing left foot ulceration    History of Present Illness:    Susan Anderson is a 82 y.o. 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 Finney, right foot osteomyelitis requiring long-term oral Augmentin for MSSA, hypertension, hyperlipidemia, chronic kidney disease stage III, congestive heart failure, ischemic cardiomyopathy, A. Fib (on Eliquis), mitral valve disease, PAD/PVD, sleep apnea, and asthma.  She was recently admitted here from 9/21-9/30 for worsening left lower extremity wound and underwent left SFA stent with Dr. Forrest on 9/28.  She presented to [facility] on 10/6 for near syncopal episode, fecal occult blood positive, and ultimately diagnosed with acute GI bleed. CT scan was negative for any acute findings. Daughter is present at bedside and states her wound has significantly improved since stent placement.     Review of Systems:  Review of Systems   Constitutional: Negative.    HENT: Negative.    Eyes: Negative.    Respiratory: Negative.    Cardiovascular: Negative.    Endocrine: Negative.    Genitourinary: Negative.    Musculoskeletal: Negative.    Skin: Positive for pallor and wound.   Neurological: Positive for dizziness and syncope.   Hematological: Bruises/bleeds easily.   Psychiatric/Behavioral: Negative.        Past Medical History:    Past Medical History:   Diagnosis Date   • Arthritis    • Asthma 6/4 - double pneumonia     Currently on inhaler and nebulizer   • Atrial fibrillation (HCC) 8/21/2022   • Cancer (HCC)     cervical cancer, skin cancer   • CHF (congestive heart failure) (HCC) June 4, 2021   • Chronic kidney disease Related to diabetes   • Coronary artery disease 6/4/2021 DX for hear failure   • Diabetes mellitus (HCC) 30 years    Seeing Dr. Lam 1st time Aug 19   • Gout    • Hx of colonoscopy    • Hyperlipidemia Reference current labs x 2-3yrs approx   • Hypertension 30 years   • Migraine    • Mitral valve disease    • Mitral valve disease    • Osteomyelitis (HCC)    • Peripheral neuropathy    • Sleep apnea    • Type 2 diabetes mellitus (HCC)     30 years       Past Surgical History:    Past Surgical History:   Procedure Laterality Date   • ABDOMINAL HYSTERECTOMY W/SALPINGECTOMY     • AMPUTATION  Right great toe, 1st 1/3 metatarsal -Reg 4/14/21   • AORTAGRAM N/A 4/9/2021    Procedure: AORTAGRAM WITH OR WITHOUT RUNOFFS, WITH Co2;  Surgeon: Trey Forrest MD;  Location:  [facility];  Service: Vascular;  Laterality: N/A;   • AORTAGRAM N/A 9/28/2022    Procedure: CO2 ANGIOGRAM, LEFT SFA ANGIOPLASTY WITH DRUG ELUTING BALLOON, LEFT SFA STENT PLACEMENT ;  Surgeon: Trey Forrest MD;  Location: [facility];  Service: Vascular;  Laterality: N/A;  FLUORO: 13.12  DOSE 162mGy  CONTRAST: Isovue 350: 15ml   • APPENDECTOMY     • BRAIN TUMOR EXCISION      laser surgery    • CARDIAC CATHETERIZATION N/A 6/21/2021    Procedure: LEFT HEART CATH;  Surgeon: Anjum Ceballos MD;  Location:  [facility];  Service: Cardiology;  Laterality: N/A;   • CATARACT EXTRACTION W/ INTRAOCULAR LENS  IMPLANT, BILATERAL     • CHOLECYSTECTOMY     • EYE SURGERY     • HYSTERECTOMY     • TOE SURGERY     • TRANS METATARSAL AMPUTATION Right 4/14/2021    Procedure: AMPUTATION TRANS METATARSAL RIGHT GREAT TOE;  Surgeon: Valdez Finney MD;  Location:  [facility];  Service: Vascular;  Laterality: Right;       Family History:    Family History    Problem Relation Age of Onset   • Diabetes Mother         Type II   • Heart disease Father    • Heart attack Father    • Coronary artery disease Father    • Diabetes Father         Type II   • Diabetes Sister    • Diabetes Maternal Grandmother    • Diabetes Maternal Grandfather    • Diabetes Paternal Grandmother    • Diabetes Paternal Grandfather        Social History:    Social History     Socioeconomic History   • Marital status:    • Number of children: 1   Tobacco Use   • Smoking status: Never Smoker   • Smokeless tobacco: Never Used   Vaping Use   • Vaping Use: Never used   Substance and Sexual Activity   • Alcohol use: Yes     Comment: Social drinking when not recovering from hospitalization   • Drug use: Never   • Sexual activity: Not Currently     Birth control/protection: None       Allergies:  Allergies   Allergen Reactions   • Baclofen Other (See Comments) and Hallucinations     PSYCHOSIS-POA REFUSES ADMINISTRATION OF THIS MED.   • Cephalexin Nausea Only   • Erythromycin Base Nausea Only   • Melatonin Other (See Comments)     Nightmares   • Oxycodone Nausea Only   • Sulfa Antibiotics Nausea Only         Physical Exam:  Vital Signs:    Temp:  [95.8 °F (35.4 °C)-98.9 °F (37.2 °C)] 96.1 °F (35.6 °C)  Heart Rate:  [53-69] 60  Resp:  [16-22] 18  BP: (105-150)/(38-77) 117/77  Body mass index is 27.81 kg/m².     Physical Exam  Constitutional:       Appearance: Normal appearance.   HENT:      Head: Normocephalic.      Mouth/Throat:      Pharynx: Oropharynx is clear.   Eyes:      Pupils: Pupils are equal, round, and reactive to light.   Cardiovascular:      Rate and Rhythm: Normal rate.      Comments: Unable to palpate left DP pulse  Pulmonary:      Effort: Pulmonary effort is normal.   Abdominal:      General: Bowel sounds are normal.      Comments: Extensive bruising noted in right groin extending to perineal area. Soft, non-tender, no hematoma.    Musculoskeletal:         General: Normal range of  motion.      Cervical back: Normal range of motion.   Skin:     Coloration: Skin is pale.      Findings: Ecchymosis and wound present.      Comments: See images below   Neurological:      General: No focal deficit present.      Mental Status: She is alert and oriented to person, place, and time.   Psychiatric:         Mood and Affect: Mood normal.             Labs/Imaging/Procedures:   Results from last 7 days   Lab Units 10/07/22  0953   SODIUM mmol/L 140   POTASSIUM mmol/L 3.6   CHLORIDE mmol/L 105   CO2 mmol/L 26.0   BUN mg/dL 82*   CREATININE mg/dL 1.20*   CALCIUM mg/dL 8.2*   BILIRUBIN mg/dL 0.7   ALK PHOS U/L 145*   ALT (SGPT) U/L 6   AST (SGOT) U/L 17   GLUCOSE mg/dL 77     Results from last 7 days   Lab Units 10/06/22  1450   TROPONIN T ng/mL 0.178*     Results from last 7 days   Lab Units 10/07/22  0953 10/06/22  1450 09/30/22  1147   WBC 10*3/mm3 7.10 9.26 6.87   HEMOGLOBIN g/dL 6.8* 7.9* 9.5*   HEMATOCRIT % 20.7* 23.8* 29.2*   PLATELETS 10*3/mm3 250 333 177         Results from last 7 days   Lab Units 10/06/22  1450   MAGNESIUM mg/dL 1.5*         CT Abdomen Pelvis Without Contrast    Result Date: 10/6/2022  No significant interval change. No acute abdominopelvic findings.  This report was finalized on 10/6/2022 3:29 PM by iKngs Baez MD.      CT Abdomen Pelvis Without Contrast    Result Date: 9/21/2022  No acute intra-abdominal findings.  This report was finalized on 9/21/2022 5:43 PM by Igor Robert.      CT Head Without Contrast    Result Date: 10/6/2022  Age-related changes of the brain as above, otherwise without evidence of acute intracranial abnormality.   This report was finalized on 10/6/2022 3:45 PM by Babatunde Dinero.      XR Chest 1 View    Result Date: 10/6/2022  No active disease.  This report was finalized on 10/6/2022 4:13 PM by Juliano Burgos MD.      MRI Foot Left Without Contrast    Result Date: 9/28/2022  Soft tissue edema along the dorsum of the foot with ulceration. Question  cellulitis. No drainable fluid collection to suggest abscess and no findings of osteomyelitis.    This report was finalized on 9/28/2022 7:38 AM by Aniyah Hobson MD.       [facility]: Results for orders placed during the hospital encounter of 06/04/21    Cardiac Catheterization/Vascular Study    Narrative  FINAL    Impression  · Mild to moderate nonobstructive CAD involving multiple vessels without any evidence of severe or critical disease.  · Moderate left ventricular systolic dysfunction, estimated EF 40-45%    RECOMMENDATIONS:  · Medical therapy and risk factor management.    Indications: Congestive heart failure/cardiomyopathy.    Access: Left radial    Procedures:  · Left heart catheterization.  · Left ventriculogram.  · Selective coronary angiography.  · Arterial site hemostasis with radial band.    Procedure narrative:  The patient was brought to the catheterization lab in a fasting condition.  Access site was prepped and draped in standard sterile fashion.  Lidocaine was injected and arterial access was obtained by percutaneous anterior wall puncture technique.  A 6 German arterial sheath was placed. Above procedures were performed without complications.  At the conclusion the arterial sheath was removed and hemostasis was achieved.  The patient was transferred to the unit in a stable condition.    Hemodynamic Findings:  Heart Rate: 70/minute.  LV pressure: 138/12-22 mmHg, on pull back no gradient was recorded across the aortic valve.    Angiographic Findings:  Right coronary dominance.  · LM: Angiographically normal.  · LAD: Diffuse mid segment plaque with sequential 50% stenosis.  The more proximal stenosis also involves a medium size diagonal branch which itself has a 40 to 50% stenosis.  · LCX: 40% proximal and 30 to 40% mid segment stenosis  · RCA: Dominant vessel with a 50% ostial/proximal stenosis and a 50% mid segment stenosis of the mid to distal PDA.  No severely occlusive disease is noted.  · LV: Left  ventriculogram performed in 30 CASTILLO projection revealed mild global hypokinesis with mild to moderate left ventricular systolic dysfunction and estimated ejection fraction of 40-45%.    Complications: No acute procedure related complications.     Echo: Results for orders placed during the hospital encounter of 08/21/22    Adult Transthoracic Echo Complete W/ Cont if Necessary Per Protocol    Interpretation Summary  · Estimated left ventricular EF = 55%  · Mild to moderate mitral valve regurgitation is present.     Carotid Duplex: Results for orders placed during the hospital encounter of 09/21/22    Duplex Lower Extremity Art / Grafts - Bilateral CAR    Interpretation Summary  · Bilateral lower extremity arterial duplex exam reveals diffuse calcified plaque without focal obstruction up to the level of bilateral popliteals.  · Below knee vessels revealed similar findings with non occlusive calcified plaque in the peroneal and anterior tibial arteries with flow demonstrated to the level of ankles. Bilateral posterior tibial flow could not be detected and is suggestive of probable occlusion.  · Bilateral ABIs are noncompressible due to vascular calcifications.    Assessment:    GIB (gastrointestinal bleeding)    Diabetic foot ulcer (HCC)    PVD (peripheral vascular disease) ([facility])    Diabetes mellitus with neuropathy (HCC)    Essential hypertension    Stage 3a chronic kidney disease (HCC)    NICM (nonischemic cardiomyopathy) ([facility])    Coronary artery disease involving native coronary artery of native heart without angina pectoris    Chronic combined systolic and diastolic heart failure (HCC)    Degeneration of lumbar or lumbosacral intervertebral disc    Physical deconditioning    Anemia, chronic disease    Elevated troponin    Atrial fibrillation on Eliquis    Sleep apnea    Vasovagal syncope    Hypomagnesemia    Syncope, unspecified syncope type      Plan:  Nonhealing left foot ulceration: Continue wound care management  per PT wound care.  Hold Plavix in setting of acute GI bleed.  GI consulted plans for EGD today.        Sobia Padilla, INDIGO  11:15 EDT  10/07/22     Thank you for allowing us to participate in the care of your patient. Please do not hesitate to contact us with additional questions or concerns.

## 2022-10-07 NOTE — ANESTHESIA POSTPROCEDURE EVALUATION
Patient: Susan Anderson    Procedure Summary     Date: 10/07/22 Room / Location:  [facility] /  [facility]    Anesthesia Start: 1650 Anesthesia Stop: 1705    Procedure: ESOPHAGOGASTRODUODENOSCOPY (N/A ) Diagnosis:       Gastrointestinal hemorrhage, unspecified gastrointestinal hemorrhage type      (Gastrointestinal hemorrhage, unspecified gastrointestinal hemorrhage type [K92.2])    Surgeons: Stef Veras MD Provider: Elvis Barnard MD    Anesthesia Type: Not recorded ASA Status: 3          Anesthesia Type: No value filed.    Vitals  No vitals data found for the desired time range.          Post Anesthesia Care and Evaluation    Patient location during evaluation: PACU  Patient participation: complete - patient participated  Level of consciousness: awake and alert  Pain score: 0  Pain management: adequate    Airway patency: patent  Anesthetic complications: No anesthetic complications  PONV Status: none  Cardiovascular status: hemodynamically stable and acceptable  Respiratory status: nonlabored ventilation, acceptable and nasal cannula  Hydration status: acceptable

## 2022-10-07 NOTE — PROGRESS NOTES
[facility] Medicine Services  PROGRESS NOTE    Patient Name: Susan Anderson  : 1940  MRN: [id number]    Date of Admission: 10/6/2022  Primary Care Physician: Arleen Doherty MD    Subjective   Subjective     CC: Follow-up near syncope, nausea    HPI: No acute events overnight, patient remains very sleepy this morning, arouses but drifts back, unable to follow conversation    ROS:  Unable to obtain secondary to somnolence, lethargy    Objective   Objective     Vital Signs:   Temp:  [95.8 °F (35.4 °C)-98.9 °F (37.2 °C)] 96.1 °F (35.6 °C)  Heart Rate:  [53-69] 53  Resp:  [16-22] 20  BP: (105-150)/(38-62) 108/48  Flow (L/min):  [1] 1     Physical Exam:  Constitutional: Chronically ill-appearing elderly female, asleep  HENT: NCAT, mucous membranes moist  Respiratory: Clear to auscultation bilaterally, respiratory effort normal   Cardiovascular: RRR, no murmurs, rubs, or gallops  Gastrointestinal: Positive bowel sounds, soft, nontender, nondistended  Musculoskeletal: No bilateral ankle edema, left lower extremity wound  Psychiatric: DERICK  Neurologic: DERICK  Skin: No rashes    Results Reviewed:  LAB RESULTS:      Lab 10/06/22  1450 22  1147   WBC 9.26 6.87   HEMOGLOBIN 7.9* 9.5*   HEMATOCRIT 23.8* 29.2*   PLATELETS 333 177   NEUTROS ABS 6.19  --    IMMATURE GRANS (ABS) 0.28*  --    LYMPHS ABS 1.82  --    MONOS ABS 0.75  --    EOS ABS 0.19  --    MCV 88.1 89.6         Lab 10/06/22  1450 22  1147   SODIUM 137 135*   POTASSIUM 3.9 4.8   CHLORIDE 98 100   CO2 25.0 27.0   ANION GAP 14.0 8.0   BUN 93* 39*   CREATININE 1.45* 1.19*   EGFR 35.9* 45.5*   GLUCOSE 170* 251*   CALCIUM 9.1 8.5*   MAGNESIUM 1.5*  --          Lab 10/06/22  1450   TOTAL PROTEIN 5.7*   ALBUMIN 2.90*   GLOBULIN 2.8   ALT (SGPT) 7   AST (SGOT) 17   BILIRUBIN 0.7   ALK PHOS 157*         Lab 10/06/22  1450   TROPONIN T 0.178*             Lab 10/06/22  1718   ABO TYPING A   RH TYPING Positive   ANTIBODY SCREEN  Negative         Brief Urine Lab Results  (Last result in the past 365 days)      Color   Clarity   Blood   Leuk Est   Nitrite   Protein   CREAT   Urine HCG        10/06/22 1602 Yellow   Cloudy   Negative   Large (3+)   Negative   100 mg/dL (2+)                 Microbiology Results Abnormal     None          CT Abdomen Pelvis Without Contrast    Result Date: 10/6/2022  DATE OF EXAM: 10/6/2022 3:02 PM  PROCEDURE: CT ABDOMEN PELVIS WO CONTRAST-  INDICATIONS: abd pain nv  COMPARISON: CT abdomen and pelvis 9/21/2022  TECHNIQUE: Routine transaxial slices were obtained through the abdomen and pelvis without the administration of intravenous contrast. Reconstructed coronal and sagittal images were also obtained. Automated exposure control and iterative construction methods were used.  The radiation dose reduction device was turned on for each scan per the ALARA (As Low as Reasonably Achievable) protocol.  FINDINGS: Lung bases are grossly clear with similar peripheral reticulations/chronic interstitial changes. Unremarkable appearance of the liver. The gallbladder is surgically absent. Normal appearance of the bile ducts. Unremarkable appearance of the spleen and pancreas. Normal appearance of the adrenal glands, kidneys, ureters, and bladder. The uterus is surgically absent. There is some inspissated stool in the rectum. No evidence of bowel obstruction or definite inflammatory change of the GI tract. No abdominopelvic free fluid or conspicuous fat stranding. No pneumoperitoneum. Scattered air within the subcutaneous tissues of the anterior mid abdomen, nonspecific, possibly reflecting sites of injection. There is atherosclerosis without aneurysm. No lymphadenopathy. No acute osseous findings.      Impression: No significant interval change. No acute abdominopelvic findings.  This report was finalized on 10/6/2022 3:29 PM by Kings Baez MD.      CT Head Without Contrast    Result Date: 10/6/2022  DATE OF EXAM:  10/6/2022 3:02 PM  PROCEDURE: CT HEAD WO CONTRAST-  INDICATIONS: syncope  COMPARISON: 7/26/2022  TECHNIQUE: Routine transaxial and coronal reconstruction images were obtained through the head without the administration of contrast. Automated exposure control and iterative reconstruction methods were used.  The radiation dose reduction device was turned on for each scan per the ALARA (As Low as Reasonably Achievable) protocol.  FINDINGS: Gray-white differentiation is maintained and there is no evidence of intracranial hemorrhage, mass or mass effect. Age-related changes of the brain are present including volume loss and typical periventricular sequela of chronic small vessel ischemia. There is otherwise no evidence of intracranial hemorrhage, mass or mass effect. The ventricles are normal in size and configuration accounting for surrounding volume loss. The orbits are normal and the paranasal sinuses are grossly clear.      Impression: Age-related changes of the brain as above, otherwise without evidence of acute intracranial abnormality.   This report was finalized on 10/6/2022 3:45 PM by Babatunde Dinero.      XR Chest 1 View    Result Date: 10/6/2022  DATE OF EXAM: 10/6/2022 4:02 PM  PROCEDURE: XR CHEST 1 VW-  INDICATIONS: Syncope.  COMPARISON: 08/26/2022.  TECHNIQUE: Single radiographic view of the chest was obtained.  FINDINGS: The heart size is normal. The pulmonary vascular markings are normal. The lungs and pleural spaces are clear of active disease.  There are chronic age-related changes involving the bony thorax and thoracic aorta.      Impression: No active disease.  This report was finalized on 10/6/2022 4:13 PM by Juliano Burgos MD.        Results for orders placed during the hospital encounter of 08/21/22    Adult Transthoracic Echo Complete W/ Cont if Necessary Per Protocol    Interpretation Summary  · Estimated left ventricular EF = 55%  · Mild to moderate mitral valve regurgitation is present.      I  have reviewed the medications:  Scheduled Meds:amoxicillin-clavulanate, 1 tablet, Oral, Q24H  atorvastatin, 40 mg, Oral, Daily  guaiFENesin, 1,200 mg, Oral, BID  insulin lispro, 0-9 Units, Subcutaneous, TID AC  lactobacillus acidophilus, 1 capsule, Oral, Daily  metoprolol tartrate, 12.5 mg, Oral, Q12H  minocycline, 100 mg, Oral, Q12H  pantoprazole, 40 mg, Intravenous, Q12H  sennosides-docusate, 2 tablet, Oral, BID  sodium chloride, 10 mL, Intravenous, Q12H  vitamin B-12, 100 mcg, Oral, Daily      Continuous Infusions:sodium chloride, 125 mL/hr, Last Rate: 125 mL/hr (10/07/22 0046)      PRN Meds:.•  acetaminophen  •  bisacodyl  •  bumetanide  •  dextrose  •  dextrose  •  glucagon (human recombinant)  •  magnesium sulfate **OR** magnesium sulfate in D5W 1g/100mL (PREMIX) **OR** magnesium sulfate  •  ondansetron  •  sodium chloride  •  sodium chloride    Assessment & Plan   Assessment & Plan     Active Hospital Problems    Diagnosis  POA   • **GIB (gastrointestinal bleeding) [K92.2]  Unknown   • Vasovagal syncope [R55]  Unknown   • Hypomagnesemia [E83.42]  Unknown   • Syncope, unspecified syncope type [R55]  Yes   • Sleep apnea [G47.30]  Yes   • Atrial fibrillation on Eliquis [I48.91]  Yes   • Anemia, chronic disease [D63.8]  Yes   • Elevated troponin [R77.8]  Unknown   • Physical deconditioning [R53.81]  Yes   • Degeneration of lumbar or lumbosacral intervertebral disc [M51.37]  Yes   • Coronary artery disease involving native coronary artery of native heart without angina pectoris [I25.10]  Yes   • Chronic combined systolic and diastolic heart failure (HCC) [I50.42]  Yes   • NICM (nonischemic cardiomyopathy) (Former[facility]) [I42.8]  Yes   • Essential hypertension [I10]  Yes   • PVD (peripheral vascular disease) (Former[facility]) [I73.9]  Yes   • Diabetes mellitus with neuropathy (HCC) [E11.40]  Yes   • Diabetic foot ulcer (HCC) [E11.621, L97.509]  Yes   • Stage 3a chronic kidney disease (HCC) [N18.31]  Unknown      Resolved Hospital  Problems   No resolved problems to display.        Brief Hospital Course to date:  Susan Anderson is a 82 y.o. female with multiple medical problems that include but not limited to CKD stage III, atrial fibrillation, hypertension, PVD/PAD type 2 diabetes, left lower extremity infection/osteomyelitis s/p mid SFA to LLE with KRISTY, ELIUD who presents to the ED with dizziness, nausea, admitted with suspected GI bleed    Symptomatic acute blood loss anemia  Suspected GI bleed  Near syncope  -CT abd/pelvis is unremarkable, CT is head negative  -Patient with hg of 7.9, on presentation was 11.2 on 9/29,  FOB is positive, suggestive of likely GI bleed  -Continue BID IV Protonix, antiemetics  -GI consulted for possible EGD    PAD/PVD  Left foot ulcer/infection/osteomyelitis  -Followed by Dr. Finney, s/p KRISTY to mid SFA 9/28  -Holding aspirin/Plavix secondary to above  -Continue oral antibiotics, currently on Augmentin/minocycline, followed by ID, Dr. Pete  -WOC, PT/OT consulted    Relatively well-controlled type 2 diabetes A1c 7.8%  -FSBG's reviewed and are appropriate  -Continue SSI for now adjust as warranted.    Hypertension  -BP currently stable, hold and hypertensives secondary to above    Paroxysmal atrial fibrillation  -Rate seems to be controlled, continue metoprolol  -Hold Eliquis due to likely GI bleed  -Cardiology consulted    Ischemic cardiomyopathy, HFpEF  -Seems to be currently compensated  -Continues beta-blocker and statin    CKD stage III  -Baseline creatinine~1.3-1.4 currently stable  -Patient followed by KAYKAY    Hypomagnesemia  -Continue to monitor and replete per protocol    Elevated troponin  -Chronic in the setting of CKD, severe PVD and ICM  -No further work-up is planned at this time    Expected Discharge Location and Transportation: Home versus rehab  Expected Discharge Date:/10/22    DVT prophylaxis:  Mechanical DVT prophylaxis orders are present.          CODE STATUS:   Code Status and Medical  Interventions:   Ordered at: 10/06/22 1834     Medical Intervention Limits:    NO intubation (DNI)    NO cardioversion     Level Of Support Discussed With:    Patient     Code Status (Patient has no pulse and is not breathing):    No CPR (Do Not Attempt to Resuscitate)     Medical Interventions (Patient has pulse or is breathing):    Limited Support       Fortino Constantino MD  10/07/22

## 2022-10-07 NOTE — CONSULTS
[facility]    Referring Provider: No ref. provider found    Primary Care Provider: Arleen Doherty MD    Reason for Consultation: GI bleed on new Eliquis/Plavix    Chief complaint dark stools and weakness    History of present illness:  Susan Anderson is a 82 y.o. female who is admitted with near syncope and nausea.  No prior history of ulcer disease.  She was started on Eliquis in August.  Had a drug-eluting stent placed on 920 with Dr. Medeiros he was also started on Plavix.  She describes dark stool about a week ago.  States that bowel since 6 days.  Admission she had a hemoglobin 6.9 and positive fecal occult blood.  Her hemoglobin today dropped to 6.8 with hematocrit of 20.7.    Please NoHist-Plus hypertension, hyperlipidemia, CAD, diabetes type 2, cervical cancer, CKD, A. fib, PAD/PVD, left lower extremity infection with osteomyelitis and receiving SFA to LLE with KRISTY on 9/28/22    Allergies:  Baclofen, Cephalexin, Erythromycin base, Melatonin, Oxycodone, and Sulfa antibiotics    Scheduled Meds:  amoxicillin-clavulanate, 1 tablet, Oral, Q24H  atorvastatin, 40 mg, Oral, Daily  guaiFENesin, 1,200 mg, Oral, BID  insulin lispro, 0-9 Units, Subcutaneous, TID AC  lactobacillus acidophilus, 1 capsule, Oral, Daily  metoprolol tartrate, 12.5 mg, Oral, Q12H  minocycline, 100 mg, Oral, Q12H  pantoprazole, 40 mg, Intravenous, Q12H  sennosides-docusate, 2 tablet, Oral, BID  sodium chloride, 10 mL, Intravenous, Q12H  vitamin B-12, 100 mcg, Oral, Daily         Infusions:  sodium chloride, 125 mL/hr, Last Rate: Stopped (10/07/22 1446)        PRN Meds:  •  acetaminophen  •  bisacodyl  •  bumetanide  •  dextrose  •  dextrose  •  glucagon (human recombinant)  •  magnesium sulfate **OR** magnesium sulfate in D5W 1g/100mL (PREMIX) **OR** magnesium sulfate  •  ondansetron  •  sodium chloride  •  sodium chloride    Home Meds:  Medications Prior to Admission   Medication Sig Dispense Refill Last Dose   • acetaminophen  (TYLENOL) 325 MG tablet Take 2 tablets by mouth Every 6 (Six) Hours As Needed for Mild Pain.   Past Week at Unknown time   • amoxicillin-clavulanate (AUGMENTIN) 875-125 MG per tablet Take 1 tablet by mouth Daily for 27 doses. Indications: Bone and/or Joint Infection 27 tablet 0 10/6/2022 at Unknown time   • apixaban (ELIQUIS) 2.5 MG tablet tablet Take 1 tablet by mouth 2 (Two) Times a Day. 60 tablet 0 10/6/2022 at Unknown time   • aspirin 81 MG EC tablet Take 1 tablet by mouth Daily.   10/6/2022 at Unknown time   • atorvastatin (LIPITOR) 40 MG tablet Take 1 tablet by mouth Daily. (Patient taking differently: Take 40 mg by mouth Every Night.) 90 tablet 0 10/5/2022 at Unknown time   • bumetanide (BUMEX) 0.5 MG tablet Take 1 tablet by mouth Daily. 30 tablet 0 10/6/2022 at Unknown time   • Cholecalciferol (Vitamin D3 Super Strength) 50 MCG (2000 UT) tablet Take 2,000 Units by mouth Daily. OTC   10/6/2022 at Unknown time   • clopidogrel (PLAVIX) 75 MG tablet Take 1 tablet by mouth Daily. 30 tablet 0 10/6/2022 at Unknown time   • famotidine (PEPCID) 20 MG tablet Take 1 tablet by mouth 2 (Two) Times a Day Before Meals. 60 tablet 0 10/6/2022 at Unknown time   • guaiFENesin (MUCINEX) 600 MG 12 hr tablet Take 2 tablets by mouth 2 (Two) Times a Day.   Past Month at Unknown time   • hydrALAZINE (APRESOLINE) 25 MG tablet Take 1 tablet by mouth Every 8 (Eight) Hours. 90 tablet 0 10/6/2022 at Unknown time   • insulin glargine (Lantus) 100 UNIT/ML injection Inject 15 Units under the skin into the appropriate area as directed Every Night.   10/5/2022 at Unknown time   • insulin lispro (HumaLOG) 100 UNIT/ML injection Inject 12 Units under the skin into the appropriate area as directed 3 (Three) Times a Day Before Meals. (Patient taking differently: Inject 12 Units under the skin into the appropriate area as directed. 3 units tid + Sliding Scale Before Meals:  6 units- 200-250  8 units- 251-300  10 units- 301-350  12 units - 351-400)  20 mL 5 10/6/2022 at Unknown time   • lactobacillus acidophilus (RISAQUAD) capsule capsule Take 1 capsule by mouth Daily. 30 capsule 0 10/6/2022 at Unknown time   • metoprolol tartrate (LOPRESSOR) 25 MG tablet Take 0.5 (one-half) tablet by mouth Every 12 (Twelve) Hours. 60 tablet 0 10/6/2022 at Unknown time   • ondansetron ODT (ZOFRAN-ODT) 4 MG disintegrating tablet Place 4 mg on the tongue Every 8 (Eight) Hours As Needed for Nausea or Vomiting.   10/6/2022 at Unknown time   • sennosides-docusate (PERICOLACE) 8.6-50 MG per tablet Take 1 tablet by mouth Every Night. (Patient taking differently: Take 1 tablet by mouth every night at bedtime.)   10/5/2022 at Unknown time   • vitamin B-12 (CYANOCOBALAMIN) 100 MCG tablet Take 1 tablet by mouth Daily. OTC   10/6/2022 at Unknown time   • diphenhydrAMINE (BENADRYL) 25 mg capsule Take 25 mg by mouth Every 6 (Six) Hours As Needed for Itching, Allergies or Sleep. Do not take at same time as potassium   Unknown at Unknown time       ROS: Review of Systems   Constitutional: Positive for fatigue. Negative for fever and unexpected weight change.   Respiratory: Negative for shortness of breath.    Cardiovascular: Negative for chest pain.   Gastrointestinal: Positive for abdominal pain, blood in stool and constipation.   Musculoskeletal: Positive for arthralgias.   Skin: Positive for color change.   Neurological: Positive for weakness.   All other systems reviewed and are negative.      PAST MED HX: Pt  has a past medical history of Arthritis, Asthma (6/4 - double pneumonia), Atrial fibrillation (HCC) (8/21/2022), Cancer (HCC), CHF (congestive heart failure) ([facility]) (June 4, 2021), Chronic kidney disease (Related to diabetes), Coronary artery disease (6/4/2021 DX for hear failure), Diabetes mellitus (HCC) (30 years), Gout, colonoscopy, Hyperlipidemia (Reference current labs x 2-3yrs approx), Hypertension (30 years), Migraine, Mitral valve disease, Mitral valve disease, Osteomyelitis  "(HCC), Peripheral neuropathy, Sleep apnea, and Type 2 diabetes mellitus (HCC).  PAST SURG HX: Pt  has a past surgical history that includes Cholecystectomy; Appendectomy; Abdominal Hysterectomy w/Salpingectomy; Cataract extraction w/ intraocular lens  implant, bilateral; Brain tumor excision; Hysterectomy; Aortagram (N/A, 4/9/2021); Foot amputation through metatarsal (Right, 4/14/2021); Cardiac catheterization (N/A, 6/21/2021); Amputation (Right great toe, 1st 1/3 metatarsal -Reg 4/14/21); Eye surgery; Toe Surgery; and Aortagram (N/A, 9/28/2022).  FAM HX: family history includes Coronary artery disease in her father; Diabetes in her father, maternal grandfather, maternal grandmother, mother, paternal grandfather, paternal grandmother, and sister; Heart attack in her father; Heart disease in her father.  SOC HX: Pt  reports that she has never smoked. She has never used smokeless tobacco. She reports current alcohol use. She reports that she does not use drugs.    /51 (BP Location: Left arm)   Pulse 82   Temp 96 °F (35.6 °C) (Oral)   Resp 18   Ht 160 cm (63\")   Wt 71.2 kg (157 lb)   SpO2 100%   BMI 27.81 kg/m²     Physical Exam  Wt Readings from Last 3 Encounters:   10/06/22 71.2 kg (157 lb)   09/22/22 75 kg (165 lb 5.5 oz)   09/12/22 75.8 kg (167 lb)   ,body mass index is 27.81 kg/m².    General elderly appearing, hard of hearing well developed; well nourished; no acute distress.   ENT   Oral mucosa pink & moist without thrush or lesions.    Neck Neck supple; trachea midline. No thyromegaly  Resp CTA; no rhonchi, rales, or wheezes.  Respiration effort normal  CV RRR; ; no M/R/G. No lower extremity edema  GI Abd soft, mild diffuse tenderness, ND, normal active bowel sounds.  No HSM.  No abd hernia  Skin ecchymosis over right hip and groin..  Skin turgor normal  MSK No clubbing; no cyanosis.    Psych Oriented to time, place, and person.  Appropriate affect      Results Review:   I reviewed the patient's " new clinical results.  I reviewed the patient's new imaging results and agree with the interpretation.    Lab Results   Component Value Date    WBC 7.10 10/07/2022    HGB 6.8 (C) 10/07/2022    HCT 20.7 (C) 10/07/2022    MCV 89.2 10/07/2022     10/07/2022       Lab Results   Component Value Date    GLUCOSE 77 10/07/2022    BUN 82 (H) 10/07/2022    CREATININE 1.20 (H) 10/07/2022    EGFRIFNONA 41 (L) 02/25/2022    BCR 68.3 (H) 10/07/2022    CO2 26.0 10/07/2022    CALCIUM 8.2 (L) 10/07/2022    ALBUMIN 2.50 (L) 10/07/2022    AST 17 10/07/2022    ALT 6 10/07/2022       ASSESSMENTS/PLANS  1.  Acute upper GI bleed  2.  Acute blood loss anemia  3.  Diabetes  4.  CKD  5.  Nonischemic cardiomyopathy  6.  CAD  7.  Recent KRISTY placement  8.  Atrial fibrillation  9.  Hypertension    Patient now appears to be acute upper GI bleed on Plavix and Eliquis.  She is stabilized and transfused.  Will plan for emergent EGD.      I discussed the patient's findings and my recommendations with patient    Stef Veras MD  10/07/22  15:47 EDT

## 2022-10-07 NOTE — CONSULTS
Inpatient Cardiology Consult  Consult performed by: Gladys Vernon APRN  Consult ordered by: Linda Lakhani APRN          Cardiology Consult   [facility] Cardiology    Primary cardiologist: Dr. Anjum Ceballos    Reason for consultation:  · Anticoagulation in the setting of GI bleed  · Paroxysmal atrial fibrillation  · Elevated troponin    Requesting provider: INDIGO Dinero  Consulting provider: INDIGO Zavala    IDENTIFICATION: 82-year-old female who resides in [place], [place]      Active Hospital Problems    Diagnosis  POA   • **GIB (gastrointestinal bleeding) [K92.2]  Yes     Priority: High   • Vasovagal syncope [R55]  Yes     Priority: High   • Demand ischemia (HCC) [I24.8]  Yes     Priority: High   • NICM (nonischemic cardiomyopathy) ([facility]) [I42.8]  Yes     Priority: High     · Echo (6/4/2021): LVEF 43%.  Mild to moderate MR.  Moderate left pleural effusion   · Cardiac cath (6/21/2021): LVEF 40-45%.  Mild to moderate nonobstructive CAD.  Medical management   · Echo (7/29/2022): EF 55%, aortic sclerosis without aortic stenosis or regurgitation, mild MR       • Diabetes mellitus with neuropathy ([facility]) [E11.40]  Yes     Priority: High   • Stage 3a chronic kidney disease ([facility]) [N18.31]  Yes     Priority: High   • PVD (peripheral vascular disease) ([facility]) [I73.9]  Yes     Priority: High     · ABIs (9/26/2022):Bilateral lower extremity arterial duplex exam reveals diffuse calcified plaque without focal obstruction up to the level of bilateral popliteals.  Below-knee vessels similar findings with nonocclusive calcified plaque in the peroneal and anterior tibial arteries.  Bilateral ABIs noncompressible due to vascular calcifications.  Bilateral posterior tibial flow could not be detected and is suggestive of probable  · Right great toe amputation due to osteomyelitis     • Paroxysmal atrial fibrillation ([facility]) [I48.0]  No     Priority: Medium     · Diagnosed  8/18/2022  · [id number]=6, on Eliquis     • Anemia, chronic disease [D63.8]  Yes     Priority: Medium   • Coronary artery disease involving native coronary artery of native heart without angina pectoris [I25.10]  Yes     Priority: Medium     · Cardiac cath (6/21/2021): Mild to moderate nonobstructive CAD.  Medical management     • Chronic combined systolic and diastolic heart failure (HCC) [I50.42]  Yes     Priority: Medium     · Echo (6/4/2021): LVEF 43%.  · Echo (7/29/2022): LVEF 55%     • Essential hypertension [I10]  Yes     Priority: Medium   • Sleep apnea [G47.30]  Yes     Priority: Low   • Degeneration of lumbar or lumbosacral intervertebral disc [M51.37]  Yes     Priority: Low   • Dyslipidemia [E78.5]  Yes     Priority: Low   • Diabetic foot ulcer (HCC) [E11.621, L97.509]  Yes     Priority: Low              Patient is an 82-year-old female with a history of nonobstructive coronary artery disease, nonischemic cardiomyopathy, chronic systolic/diastolic heart failure, peripheral vascular disease status post right great toe amputation, stage III chronic kidney disease, type 2 diabetes mellitus and paroxysmal atrial fibrillaton who has had multiple hospitalizations over the past several months.  She was hospitalized in July to August for mild rhabdomyolysis after a fall at home.  This resulted in acute on chronic heart failure and acute kidney injury on chronic kidney disease.  She was treated with IV diuretics and eventually discharged to the [facility].  She presented back towards the end of August with shortness of breath and minimal urine output.  She was bradycardic with heart rates in the 40s that resolved after metoprolol was discontinued.  She was eventually discharged back to the [facility] but towards the end of September she presented back to [facility]  with nausea and vomiting and was just discharged back to the [facility] on 9/30/2022.  She has now presented back to [facility]  "emergency room last evening reporting nausea, vomiting and a near syncopal spell that occurred while she was on the toilet trying to have a bowel movement.  The patient was found to be anemic with an H&H of 7.9 and 23.8.  Fecal occult stool was positive.  Troponin was elevated at 0.178.  EKG shows sinus rhythm with occasional PVCs.  We have been asked to consult regarding her anticoagulation in the setting of a GI bleed.  The patient is very lethargic but does arouse.  Her daughter is at bedside.  She reported the patient had been doing well until yesterday when she felt very weak.  The daughter said while the patient was on the toilet she hollered for her because she was dry heaving.  She says the patient did not pass out but did not speak for a few seconds and her pupils looked \"pinpoint\".  The patient denies any chest pain or shortness of breath.        Allergies   Allergen Reactions   • Baclofen Other (See Comments) and Hallucinations     PSYCHOSIS-POA REFUSES ADMINISTRATION OF THIS MED.   • Cephalexin Nausea Only   • Erythromycin Base Nausea Only   • Melatonin Other (See Comments)     Nightmares   • Oxycodone Nausea Only   • Sulfa Antibiotics Nausea Only       Prior to Admission medications    Medication Sig Start Date End Date Taking? Authorizing Provider   acetaminophen (TYLENOL) 325 MG tablet Take 2 tablets by mouth Every 6 (Six) Hours As Needed for Mild Pain. 9/30/22  Yes Sandra Bernstein APRN   amoxicillin-clavulanate (AUGMENTIN) 875-125 MG per tablet Take 1 tablet by mouth Daily for 27 doses. Indications: Bone and/or Joint Infection 10/1/22 10/28/22 Yes Sandra Bernstein APRN   apixaban (ELIQUIS) 2.5 MG tablet tablet Take 1 tablet by mouth 2 (Two) Times a Day. 9/30/22  Yes Sandra Bernstein APRN   aspirin 81 MG EC tablet Take 1 tablet by mouth Daily. 6/27/21  Yes Goldie Mccracken DO   atorvastatin (LIPITOR) 40 MG tablet Take 1 tablet by mouth Daily.  Patient taking differently: Take 40 mg by mouth Every " Night. 9/30/22  Yes Sandra Bernstein APRN   bumetanide (BUMEX) 0.5 MG tablet Take 1 tablet by mouth Daily. 10/1/22  Yes Sandra Bernstein APRN   Cholecalciferol (Vitamin D3 Super Strength) 50 MCG (2000 UT) tablet Take 2,000 Units by mouth Daily. OTC   Yes ProviderLisa MD   clopidogrel (PLAVIX) 75 MG tablet Take 1 tablet by mouth Daily. 10/1/22  Yes Sandra Bernstein APRN   famotidine (PEPCID) 20 MG tablet Take 1 tablet by mouth 2 (Two) Times a Day Before Meals. 9/30/22  Yes Sandra Bernstein APRN   guaiFENesin (MUCINEX) 600 MG 12 hr tablet Take 2 tablets by mouth 2 (Two) Times a Day. 8/12/22  Yes Anna Crystal PA-C   hydrALAZINE (APRESOLINE) 25 MG tablet Take 1 tablet by mouth Every 8 (Eight) Hours. 9/30/22  Yes Sandra Bernstein APRN   insulin glargine (Lantus) 100 UNIT/ML injection Inject 15 Units under the skin into the appropriate area as directed Every Night. 9/30/22  Yes Sandra Bernstein APRN   insulin lispro (HumaLOG) 100 UNIT/ML injection Inject 12 Units under the skin into the appropriate area as directed 3 (Three) Times a Day Before Meals.  Patient taking differently: Inject 12 Units under the skin into the appropriate area as directed. 3 units tid + Sliding Scale Before Meals:  6 units- 200-250  8 units- 251-300  10 units- 301-350  12 units - 351-400 8/12/22  Yes Anna Crystal PA-C   lactobacillus acidophilus (RISAQUAD) capsule capsule Take 1 capsule by mouth Daily. 10/1/22  Yes Sandra Bernstein APRN   metoprolol tartrate (LOPRESSOR) 25 MG tablet Take 0.5 (one-half) tablet by mouth Every 12 (Twelve) Hours. 9/30/22  Yes Sandra Bernstein APRN   ondansetron ODT (ZOFRAN-ODT) 4 MG disintegrating tablet Place 4 mg on the tongue Every 8 (Eight) Hours As Needed for Nausea or Vomiting.   Yes Provider, Historical, MD   sennosides-docusate (PERICOLACE) 8.6-50 MG per tablet Take 1 tablet by mouth Every Night.  Patient taking differently: Take 1 tablet by mouth every night at bedtime.  8/13/22  Yes Anna Crystal PA-C   vitamin B-12 (CYANOCOBALAMIN) 100 MCG tablet Take 1 tablet by mouth Daily. OTC   Yes Provider, MD Lisa   diphenhydrAMINE (BENADRYL) 25 mg capsule Take 25 mg by mouth Every 6 (Six) Hours As Needed for Itching, Allergies or Sleep. Do not take at same time as potassium    ProviderLisa MD       Past Medical History:   Diagnosis Date   • Arthritis    • Asthma 6/4 - double pneumonia    Currently on inhaler and nebulizer   • Atrial fibrillation (HCC) 8/21/2022   • Cancer (HCC)     cervical cancer, skin cancer   • CHF (congestive heart failure) ([facility]) June 4, 2021   • Chronic kidney disease Related to diabetes   • Coronary artery disease 6/4/2021 DX for hear failure   • Diabetes mellitus (HCC) 30 years    Seeing Dr. Lam 1st time Aug 19   • Gout    • Hx of colonoscopy    • Hyperlipidemia Reference current labs x 2-3yrs approx   • Hypertension 30 years   • Migraine    • Mitral valve disease    • Mitral valve disease    • Osteomyelitis (HCC)    • Peripheral neuropathy    • Sleep apnea    • Type 2 diabetes mellitus (HCC)     30 years       Past Surgical History:   Procedure Laterality Date   • ABDOMINAL HYSTERECTOMY W/SALPINGECTOMY     • AMPUTATION  Right great toe, 1st 1/3 metatarsal -Berry 4/14/21   • AORTAGRAM N/A 4/9/2021    Procedure: AORTAGRAM WITH OR WITHOUT RUNOFFS, WITH Co2;  Surgeon: Trey Forrest MD;  Location: [facility];  Service: Vascular;  Laterality: N/A;   • AORTAGRAM N/A 9/28/2022    Procedure: CO2 ANGIOGRAM, LEFT SFA ANGIOPLASTY WITH DRUG ELUTING BALLOON, LEFT SFA STENT PLACEMENT ;  Surgeon: Trey Forrest MD;  Location:  [facility];  Service: Vascular;  Laterality: N/A;  FLUORO: 13.12  DOSE 162mGy  CONTRAST: Isovue 350: 15ml   • APPENDECTOMY     • BRAIN TUMOR EXCISION      laser surgery    • CARDIAC CATHETERIZATION N/A 6/21/2021    Procedure: LEFT HEART CATH;  Surgeon: Anjum Ceballos MD;  Location: [facility] CATH INVASIVE [facility];   Service: Cardiology;  Laterality: N/A;   • CATARACT EXTRACTION W/ INTRAOCULAR LENS  IMPLANT, BILATERAL     • CHOLECYSTECTOMY     • EYE SURGERY     • HYSTERECTOMY     • TOE SURGERY     • TRANS METATARSAL AMPUTATION Right 4/14/2021    Procedure: AMPUTATION TRANS METATARSAL RIGHT GREAT TOE;  Surgeon: Valdez Finney MD;  Location: [facility];  Service: Vascular;  Laterality: Right;       Family History   Problem Relation Age of Onset   • Diabetes Mother         Type II   • Heart disease Father    • Heart attack Father    • Coronary artery disease Father    • Diabetes Father         Type II   • Diabetes Sister    • Diabetes Maternal Grandmother    • Diabetes Maternal Grandfather    • Diabetes Paternal Grandmother    • Diabetes Paternal Grandfather        Social History     Tobacco Use   Smoking Status Never Smoker   Smokeless Tobacco Never Used       Social History     Substance and Sexual Activity   Alcohol Use Yes    Comment: Social drinking when not recovering from hospitalization         Review of Systems:   Review of Systems   Constitutional: Negative for malaise/fatigue.   Eyes: Negative for vision loss in left eye and vision loss in right eye.   Cardiovascular: Positive for near-syncope and syncope. Negative for chest pain, dyspnea on exertion, orthopnea, palpitations and paroxysmal nocturnal dyspnea.   Musculoskeletal: Negative for myalgias.   Neurological: Negative for brief paralysis, excessive daytime sleepiness, focal weakness, numbness, paresthesias and weakness.   All other systems reviewed and are negative.           Vital Sign Min/Max for last 24 hours  Temp  Min: 95.8 °F (35.4 °C)  Max: 98.9 °F (37.2 °C)   BP  Min: 105/60  Max: 150/62   Pulse  Min: 53  Max: 70   Resp  Min: 16  Max: 22   SpO2  Min: 96 %  Max: 100 %   Flow (L/min)  Min: 1  Max: 1      Intake/Output Summary (Last 24 hours) at 10/7/2022 1336  Last data filed at 10/7/2022 0532  Gross per 24 hour   Intake 1000 ml   Output 500 ml   Net 500 ml     "         Neck:      Vascular: No JVD.   Pulmonary:      Effort: Pulmonary effort is normal.      Breath sounds: Decreased breath sounds present.   Cardiovascular:      Bradycardia present. Regular rhythm.      Murmurs: There is no murmur.   Skin:     General: Skin is cool and dry.      Coloration: Skin is pale.   Neurological:      Mental Status: Oriented to person, place and time. Lethargic.          Tele: NSR    DATA REVIEW:    EKG (10/6/2022): Sinus rhythm with occasional PVC's.  No acute ischemia    CXR (10/6/2022): No active disease    ECHO (8/27/2022): LVEF 55%.  Mild to moderate MR      Results from last 7 days   Lab Units 10/07/22  0953 10/06/22  1450   SODIUM mmol/L 140 137   POTASSIUM mmol/L 3.6 3.9   CHLORIDE mmol/L 105 98   BUN mg/dL 82* 93*   CREATININE mg/dL 1.20* 1.45*   MAGNESIUM mg/dL  --  1.5*     Results from last 7 days   Lab Units 10/06/22  1450   TROPONIN T ng/mL 0.178*     Results from last 7 days   Lab Units 10/07/22  0953 10/06/22  1450   WBC 10*3/mm3 7.10 9.26   HEMOGLOBIN g/dL 6.8* 7.9*   HEMATOCRIT % 20.7* 23.8*   PLATELETS 10*3/mm3 250 333     Lab Results   Component Value Date    HGBA1C 7.80 (H) 09/24/2022     Lab Results   Component Value Date    CHOL 186 06/04/2021    TRIG 103 06/04/2021    HDL 40 06/04/2021     (H) 06/04/2021                 GI bleed/symptomatic anemia  · H&H 7.9.  Received 1 unit of packed red blood cells but hemoglobin hematocrit now 6.8 and 20.7  · Eliquis, Plavix and aspirin on hold  · GI consulted    Near syncope  · Likely vasovagal has occurred while patient was on the toilet having bowel movement and was \"dry heaving\" with nausea and vomiting  · CT of the head benign only age-related finding    Paroxysmal atrial fibrillation  · Historically on Eliquis but currently on hold due to GI bleed  · Maintaining normal sinus rhythm  · Metoprolol tartrate 12.5 mg bid      Ischemic cardiomyopathy/systolic heart failure  · Historically LVEF 43% in June 2021  · Cath " 6/2021 mild to moderate nonobstructive CAD  · Echo 7/2022 LVEF 55%  · Appears compensated      Type II NSTEMI/demand ischemia   · Cath 2021 showed nonobstructive CAD   · Troponin elevation likely due to demand ischemia from anemia  · No need for ischemic evaluation as patient has no signs or symptoms of angina.  Cath 1 year ago nonobstructive CAD      Peripheral arterial disease  · Recent left SFA stenting with Dr. Finney  · Left foot ulcer/osteomyelitis.  On Augmentin.  ID following  · Aspirin and Plavix on hold due to GI bleed           Hypertension  · Hydralazine on hold due to hypotension  · Metoprolol tartrate 12.5 mg twice daily  · Current /52      Hyperlipidemia  · Lipitor 40 mg daily    Type 2 diabetes mellitus  · Management per hospital  · Hemoglobin A1c 7.8    Stage III chronic kidney disease  · Baseline creatinine 1.3-1.4.  Follows nephrology  · Current creatinine 1.2                       · Continue to hold Eliquis.  May need to discontinue indefinitely  · Defer need for DAPT to Dr. Finney given recent iliac stenting  · Awaiting GI evaluation  · Could discuss potential candidacy for watchman device with primary cardiologist Dr. Ceballos but this could be discussed as outpatient in clinic      INDIGO Zavala scribe for Dr. Zay Camarena      Electronically signed by INDIGO Blake, 10/07/22, 1:29 PM EDT.  I agree with the above assessment plan.  I personally reviewed the patient's record including reviewing the cardiac cath myself.  Reviewed the echocardiogram report and blood work hemoglobin is actually lower now than when she first got here.  Her vital signs are stable.  The patient was on triple therapy including DAPT and anticoagulant.  All those have been discontinued.  Patient is currently in sinus rhythm.  Agree with need for EGD.  Chronically elevated troponins no further evaluation needed.  Patient has a history of prior bradycardias have to be cautious with beta-blockers.

## 2022-10-07 NOTE — CASE MANAGEMENT/SOCIAL WORK
Discharge Planning Assessment  [facility]     Patient Name: Susan Anderson  MRN: [id number]  Today's Date: 10/7/2022    Admit Date: 10/6/2022    Plan: Home with family support   Discharge Needs Assessment     Row Name 10/07/22 1343       Living Environment    People in Home alone  Daughter helpes with care and have caregivers at night.    Current Living Arrangements home    Primary Care Provided by child(rui)    Family Caregiver if Needed none       Resource/Environmental Concerns    Resource/Environmental Concerns none       Transition Planning    Patient/Family Anticipates Transition to home with family    Patient/Family Anticipated Services at Transition none    Transportation Anticipated family or friend will provide       Discharge Needs Assessment    Equipment Currently Used at Home wheelchair;grab bar;walker, rolling;cane, quad;shower chair;commode    Equipment Needed After Discharge none               Discharge Plan     Row Name 10/07/22 8254       Plan    Plan Home with family support    Patient/Family in Agreement with Plan yes    Plan Comments Spoke with daughter and patient at bedside. Patient lives alone with family support in [facility]. Patient caregivers that stay with her at night. Patient has a motorized wheelchair, walker, cane, chair lift, shower chair, and bedside commode. Daughter states that they have everything they need at home. Plan is Home with family support. They do not want to go to rehab or have  service. They already have appointment with [facility] [place] outpatient [facility]hab. Daughter wanted to contact them on Monday. Daughter can transport home. PCP is Arleen Doherty. No other d/c needs at this time.    Final Discharge Disposition Code 01 - home or self-care              Continued Care and Services - Admitted Since 10/6/2022    Coordination has not been started for this encounter.     Selected Continued Care - Prior Encounters Includes selections from prior encounters from 7/8/2022 to 10/7/2022     Discharged on 9/30/2022 Admission date: 9/21/2022 - Discharge disposition: Home or Self Care    Destination     Service Provider Selected Services Address Phone Fax Patient Preferred    [facility]  Skilled Nursing [address] [place], [place] [id number] [phone] --                Discharged on 9/3/2022 Admission date: 8/21/2022 - Discharge disposition: Rehab Facility or Unit (DC - External)    Destination     Service Provider Selected Services Address Phone Fax Patient Preferred    [facility]  Skilled Nursing [address] [place] [id number] [phone] --                Discharged on 8/12/2022 Admission date: 7/26/2022 - Discharge disposition: Skilled Nursing Facility (DC - External)    Destination     Service Provider Selected Services Address Phone Fax Patient Preferred    [facility]  Skilled Nursing [address] [place] [address] [phone] --                    Expected Discharge Date and Time     Expected Discharge Date Expected Discharge Time    Oct 10, 2022          Demographic Summary     Row Name 10/07/22 1342       General Information    Preferred Language English               Functional Status     Row Name 10/07/22 1342       Functional Status    Usual Activity Tolerance fair    Current Activity Tolerance fair       Functional Status, IADL    Medications independent    Meal Preparation independent    Housekeeping independent    Laundry independent    Shopping independent               Psychosocial    No documentation.                Abuse/Neglect    No documentation.                Legal    No documentation.                Substance Abuse    No documentation.                Patient Forms    No documentation.                   Karis Barrera RN

## 2022-10-07 NOTE — CONSULTS
INFECTIOUS DISEASE CONSULT/INITIAL HOSPITAL VISIT    Susan Anderson  1940  [id number]    Date of Consult: 10/7/2022    Admission Date: 10/6/2022      Requesting Provider: Jason Garcia DO  Evaluating Physician: Stef Pete MD    Reason for Consultation: Bilateral foot infections    History of present illness:    Patient is a 82 y.o. female with h/o type 2 diabetes mellitus, systolic/diastolic congestive heart failure, stage II-3 chronic kidney disease, chronic right leg lymphedema, afib/Eliquis, coronary artery disease, gout, asthma, peripheral vascular disease, MSSA right hallux osteomyelitis requiring transmetatarsal amputation, cervical cancer, and a recent left dorsal foot ulcer with cellulitis who we were asked to see for reassessment of bilateral foot infections in setting of GI bleed.  She has been on prolonged oral Augmentin for residual MSSA foot infection after the infection extended over into the second metatarsal phalangeal region.  This wound has gradually improved and is now healed but she remains at high risk for persistent infection/relapse.  She developed a distal left dorsal foot ulcer earlier in 9/2022 and was placed on doxycycline along with silver dressing therapy at her nursing facility. She was hospitalized at [facility] from 9/21-9/30/22 during which she underwent left SFA angioplasty and stent placement. She was discharged on oral Augmentin and oral minocycline.  Her left dorsal foot wound look significantly better this past week in the office.      She returned to [facility] ED on 10/6 for a near syncopal event.  She was doing well over the past week.  She recently restarted Plavix.  On 10/5, she became weak and fatigued.  She has some nausea, but no fever or chills.  On 10/6, the patient went to bathroom as she felt she need to have a BM and had a near syncopal event.  On arrival, the patient is afebrile and hemodynamically stable.  Her admitting labs were WBC 9300 with 67% neutrophils, Hgb  7.9, creatinine 1.45 (baseline around 1.2), and UA WBC TNTC with large LE/negative nitrite.  A urine culture is pending. She was transfused with 1U pRBC.  A CXR, CT scan of head, and CT scan of a/p all showed no acute findings.  She was continued on Augmentin and minocycline.     Past Medical History:   Diagnosis Date   • Arthritis    • Asthma 6/4 - double pneumonia    Currently on inhaler and nebulizer   • Atrial fibrillation (HCC) 8/21/2022   • Cancer (HCC)     cervical cancer, skin cancer   • CHF (congestive heart failure) (HCC) June 4, 2021   • Chronic kidney disease Related to diabetes   • Coronary artery disease 6/4/2021 DX for hear failure   • Diabetes mellitus (HCC) 30 years    Seeing Dr. Lam 1st time Aug 19   • Gout    • Hx of colonoscopy    • Hyperlipidemia Reference current labs x 2-3yrs approx   • Hypertension 30 years   • Migraine    • Mitral valve disease    • Mitral valve disease    • Osteomyelitis (HCC)    • Peripheral neuropathy    • Sleep apnea    • Type 2 diabetes mellitus ([facility])     30 years       Past Surgical History:   Procedure Laterality Date   • ABDOMINAL HYSTERECTOMY W/SALPINGECTOMY     • AMPUTATION  Right great toe, 1st 1/3 metatarsal -[facility] 4/14/21   • AORTAGRAM N/A 4/9/2021    Procedure: AORTAGRAM WITH OR WITHOUT RUNOFFS, WITH Co2;  Surgeon: Trey Forrest MD;  Location:  [facility];  Service: Vascular;  Laterality: N/A;   • AORTAGRAM N/A 9/28/2022    Procedure: CO2 ANGIOGRAM, LEFT SFA ANGIOPLASTY WITH DRUG ELUTING BALLOON, LEFT SFA STENT PLACEMENT ;  Surgeon: Trey Forrest MD;  Location:  [facility];  Service: Vascular;  Laterality: N/A;  FLUORO: 13.12  DOSE 162mGy  CONTRAST: Isovue 350: 15ml   • APPENDECTOMY     • BRAIN TUMOR EXCISION      laser surgery    • CARDIAC CATHETERIZATION N/A 6/21/2021    Procedure: LEFT HEART CATH;  Surgeon: Anjum Ceballos MD;  Location:  [facility];  Service: Cardiology;  Laterality: N/A;   • CATARACT EXTRACTION W/  INTRAOCULAR LENS  IMPLANT, BILATERAL     • CHOLECYSTECTOMY     • EYE SURGERY     • HYSTERECTOMY     • TOE SURGERY     • TRANS METATARSAL AMPUTATION Right 4/14/2021    Procedure: AMPUTATION TRANS METATARSAL RIGHT GREAT TOE;  Surgeon: Valdez Finney MD;  Location: [facility];  Service: Vascular;  Laterality: Right;       Family History   Problem Relation Age of Onset   • Diabetes Mother         Type II   • Heart disease Father    • Heart attack Father    • Coronary artery disease Father    • Diabetes Father         Type II   • Diabetes Sister    • Diabetes Maternal Grandmother    • Diabetes Maternal Grandfather    • Diabetes Paternal Grandmother    • Diabetes Paternal Grandfather        Social History     Socioeconomic History   • Marital status:    • Number of children: 1   Tobacco Use   • Smoking status: Never Smoker   • Smokeless tobacco: Never Used   Vaping Use   • Vaping Use: Never used   Substance and Sexual Activity   • Alcohol use: Yes     Comment: Social drinking when not recovering from hospitalization   • Drug use: Never   • Sexual activity: Not Currently     Birth control/protection: None       Allergies   Allergen Reactions   • Baclofen Other (See Comments) and Hallucinations     PSYCHOSIS-POA REFUSES ADMINISTRATION OF THIS MED.   • Cephalexin Nausea Only   • Erythromycin Base Nausea Only   • Melatonin Other (See Comments)     Nightmares   • Oxycodone Nausea Only   • Sulfa Antibiotics Nausea Only         Medication:    Current Facility-Administered Medications:   •  acetaminophen (TYLENOL) tablet 650 mg, 650 mg, Oral, Q6H PRN, Linda Lakhani R, APRN  •  amoxicillin-clavulanate (AUGMENTIN) 875-125 MG per tablet 1 tablet, 1 tablet, Oral, Q24H, Linda Lakhani, APRN, 1 tablet at 10/06/22 5009  •  atorvastatin (LIPITOR) tablet 40 mg, 40 mg, Oral, Daily, Linda Lakhani R, APRN  •  bisacodyl (DULCOLAX) suppository 10 mg, 10 mg, Rectal, Daily PRN, Linda Lakhani, APRN  •  bumetanide (BUMEX)  injection 0.5 mg, 0.5 mg, Intravenous, PRN, Linda Lakhani, APRN  •  dextrose (D50W) (25 g/50 mL) IV injection 25 g, 25 g, Intravenous, Q15 Min PRN, Linda Lakhani APRN  •  dextrose (GLUTOSE) oral gel 15 g, 15 g, Oral, Q15 Min PRN, Linda Lakhani, APRN  •  glucagon (human recombinant) (GLUCAGEN DIAGNOSTIC) injection 1 mg, 1 mg, Intramuscular, Q15 Min PRN, Linda Lakhani APRN  •  guaiFENesin (MUCINEX) 12 hr tablet 1,200 mg, 1,200 mg, Oral, BID, Linda Lakhani APRTED, 1,200 mg at 10/06/22 2200  •  Insulin Lispro (humaLOG) injection 0-9 Units, 0-9 Units, Subcutaneous, TID AC, Linda Lakhani APRTED  •  lactobacillus acidophilus (RISAQUAD) capsule 1 capsule, 1 capsule, Oral, Daily, Linda Lakhani APRN  •  magnesium sulfate 4 gram infusion - Mg less than or equal to 1mg/dL, 4 g, Intravenous, PRN **OR** magnesium sulfate 3 gram infusion (1gm x 3) - Mg 1.1 - 1.5 mg/dL, 1 g, Intravenous, PRN **OR** Magnesium Sulfate 2 gram infusion- Mg 1.6 - 1.9 mg/dL, 2 g, Intravenous, PRN, Linda Lakhani, APRN  •  metoprolol tartrate (LOPRESSOR) half tablet 12.5 mg, 12.5 mg, Oral, Q12H, Linda Lakhani APRN, 12.5 mg at 10/06/22 2200  •  minocycline (MINOCIN,DYNACIN) capsule 100 mg, 100 mg, Oral, Q12H, Linda Lakhani APRN, 100 mg at 10/06/22 2252  •  ondansetron (ZOFRAN) injection 4 mg, 4 mg, Intravenous, Q6H PRN, Linda Lakhani, APRN  •  pantoprazole (PROTONIX) injection 40 mg, 40 mg, Intravenous, Q12H, Linda Lakhani APRN, 40 mg at 10/07/22 1007  •  sennosides-docusate (PERICOLACE) 8.6-50 MG per tablet 2 tablet, 2 tablet, Oral, BID, Linda Lakhani APRN, 2 tablet at 10/06/22 2200  •  sodium chloride 0.9 % flush 10 mL, 10 mL, Intravenous, PRN, Rufus, Jah Nova MD  •  sodium chloride 0.9 % flush 10 mL, 10 mL, Intravenous, Q12H, Linda Lakhani APRN, 10 mL at 10/06/22 2201  •  sodium chloride 0.9 % flush 10 mL, 10 mL, Intravenous, PRN, Linda Lakhani APRN  •  sodium chloride 0.9 %  infusion, 125 mL/hr, Intravenous, Continuous, Trey Solitario APRN, Held at 10/07/22 1446  •  vitamin B-12 (CYANOCOBALAMIN) tablet 100 mcg, 100 mcg, Oral, Daily, Linda Lakhani APRN    Antibiotics:  Anti-Infectives (From admission, onward)    Ordered     Dose/Rate Route Frequency Start Stop    10/06/22 2058  amoxicillin-clavulanate (AUGMENTIN) 875-125 MG per tablet 1 tablet        Ordering Provider: Linda Lakhani APRN    1 tablet Oral Every 24 Hours 10/06/22 2200 10/28/22 2159    10/06/22 2058  minocycline (MINOCIN,DYNACIN) capsule 100 mg        Ordering Provider: Linda Lakhani APRN    100 mg Oral Every 12 Hours Scheduled 10/06/22 2145 10/13/22 2059            Review of Systems:  The patient appears confused today and is not answering any questions.  Doubt she could give a reliable history.       Physical Exam:   Vital Signs  Temp (24hrs), Av.5 °F (35.8 °C), Min:95.8 °F (35.4 °C), Max:97.2 °F (36.2 °C)    Temp  Min: 95.8 °F (35.4 °C)  Max: 97.2 °F (36.2 °C)  BP  Min: 105/60  Max: 147/63  Pulse  Min: 53  Max: 75  Resp  Min: 12  Max: 22  SpO2  Min: 96 %  Max: 100 %    GENERAL: Awake, appears confused, in no acute distress.   HEENT: Normocephalic, atraumatic.  PERRL. EOMI. No conjunctival injection. No icterus. Oropharynx clear without evidence of thrush or exudate.  NECK: Supple without nuchal rigidity. No mass.  LYMPH: No cervical, axillary or inguinal lymphadenopathy.  HEART: RRR; No murmur, rubs, gallops.   LUNGS: Clear to auscultation bilaterally without wheezing, rales, rhonchi. Normal respiratory effort. Nonlabored.  ABDOMEN: Soft, nontender, nondistended. Positive bowel sounds. No rebound or guarding. NO mass or HSM.  EXT:  No cyanosis, clubbing or edema. No cord.  :  Without Bui catheter.  MSK:  FROM, no joint effusions.   SKIN: Warm and dry without cutaneous eruptions on Inspection/palpation.  Left dorsal foot with silver dressing. This wound appears substantially improved with increased  granulation tissue and some reepithelialization along the wound margins.  The wound is now approximately 2-2.5 cm in diameter and 3 mm deep. No surrounding erythema.   NEURO: Awake, confused, not oriented.  PSYCHIATRIC: Cooperative with PE    Laboratory Data    Results from last 7 days   Lab Units 10/07/22  0953 10/06/22  1450   WBC 10*3/mm3 7.10 9.26   HEMOGLOBIN g/dL 6.8* 7.9*   HEMATOCRIT % 20.7* 23.8*   PLATELETS 10*3/mm3 250 333     Results from last 7 days   Lab Units 10/07/22  0953   SODIUM mmol/L 140   POTASSIUM mmol/L 3.6   CHLORIDE mmol/L 105   CO2 mmol/L 26.0   BUN mg/dL 82*   CREATININE mg/dL 1.20*   GLUCOSE mg/dL 77   CALCIUM mg/dL 8.2*     Results from last 7 days   Lab Units 10/07/22  0953   ALK PHOS U/L 145*   BILIRUBIN mg/dL 0.7   ALT (SGPT) U/L 6   AST (SGOT) U/L 17                         Estimated Creatinine Clearance: 33.6 mL/min (A) (by C-G formula based on SCr of 1.2 mg/dL (H)).      Microbiology:  Urine cx is pending.           Radiology:  Imaging Results (Last 72 Hours)     Procedure Component Value Units Date/Time    XR Chest 1 View [[id number]] Collected: 10/06/22 1612     Updated: 10/06/22 1616    Narrative:      DATE OF EXAM:  10/6/2022 4:02 PM     PROCEDURE:  XR CHEST 1 VW-     INDICATIONS:  Syncope.      COMPARISON:  08/26/2022.     TECHNIQUE:   Single radiographic view of the chest was obtained.     FINDINGS:  The heart size is normal. The pulmonary vascular markings are normal.  The lungs and pleural spaces are clear of active disease.  There are  chronic age-related changes involving the bony thorax and thoracic  aorta.       Impression:      No active disease.     This report was finalized on 10/6/2022 4:13 PM by Juliano Burgos MD.       CT Head Without Contrast [[id number]] Collected: 10/06/22 1544     Updated: 10/06/22 1548    Narrative:      DATE OF EXAM: 10/6/2022 3:02 PM     PROCEDURE: CT HEAD WO CONTRAST-     INDICATIONS: syncope     COMPARISON: 7/26/2022     TECHNIQUE: Routine  transaxial and coronal reconstruction images were  obtained through the head without the administration of contrast.  Automated exposure control and iterative reconstruction methods were  used.     The radiation dose reduction device was turned on for each scan per the  ALARA (As Low as Reasonably Achievable) protocol.     FINDINGS: Gray-white differentiation is maintained and there is no  evidence of intracranial hemorrhage, mass or mass effect. Age-related  changes of the brain are present including volume loss and typical  periventricular sequela of chronic small vessel ischemia. There is  otherwise no evidence of intracranial hemorrhage, mass or mass effect.  The ventricles are normal in size and configuration accounting for  surrounding volume loss. The orbits are normal and the paranasal sinuses  are grossly clear.       Impression:      Age-related changes of the brain as above, otherwise without  evidence of acute intracranial abnormality.        This report was finalized on 10/6/2022 3:45 PM by Babatunde Dinero.       CT Abdomen Pelvis Without Contrast [[id number]] Collected: 10/06/22 1523     Updated: 10/06/22 1533    Narrative:      DATE OF EXAM: 10/6/2022 3:02 PM     PROCEDURE: CT ABDOMEN PELVIS WO CONTRAST-     INDICATIONS: abd pain nv     COMPARISON: CT abdomen and pelvis 9/21/2022     TECHNIQUE: Routine transaxial slices were obtained through the abdomen  and pelvis without the administration of intravenous contrast.  Reconstructed coronal and sagittal images were also obtained. Automated  exposure control and iterative construction methods were used.     The radiation dose reduction device was turned on for each scan per the  ALARA (As Low as Reasonably Achievable) protocol.     FINDINGS:  Lung bases are grossly clear with similar peripheral  reticulations/chronic interstitial changes. Unremarkable appearance of  the liver. The gallbladder is surgically absent. Normal appearance of  the bile ducts.  Unremarkable appearance of the spleen and pancreas.  Normal appearance of the adrenal glands, kidneys, ureters, and bladder.  The uterus is surgically absent. There is some inspissated stool in the  rectum. No evidence of bowel obstruction or definite inflammatory change  of the GI tract. No abdominopelvic free fluid or conspicuous fat  stranding. No pneumoperitoneum. Scattered air within the subcutaneous  tissues of the anterior mid abdomen, nonspecific, possibly reflecting  sites of injection. There is atherosclerosis without aneurysm. No  lymphadenopathy. No acute osseous findings.        Impression:      No significant interval change. No acute abdominopelvic findings.     This report was finalized on 10/6/2022 3:29 PM by Kings Baez MD.               Impression:   1. Left dorsal foot ulcer/wound infection- Wound is now dramatically better after vascular intervention.  She underwent vascular intervention on 9/28. Her left dorsal foot ulcer appears significantly improved.  2. MSSA right foot osteomyelitis- she had an extensive, limb threatening right foot infection which finally healed after multiple surgical interventions and prolonged intravenous followed by oral antibiotic therapy.  I have considered her a very high risk for relapse of her MSSA right foot osteomyelitis and I have left her on chronic oral antibiotic suppression.  If she develops significant antibiotic side effects from Augmentin, we may need to discontinue the Augmentin. Her foot is now markedly improved.  3. Acute GI bleed.  NEW  4. Pyuria, possible UTI vs colonization.   5. Anemia secondary to GI bleed. 1U pRBC transfusion 10/6.  6. Near syncopal event  7. Acute renal insufficiency. ATN vs dehydration.   8. History of right knee hematoma-this was quite extensive and required hospitalization in 8/22.  This is now resolved.  9. Type 2 diabetes mellitus-this increases her risk for recurrent infections  10. Peripheral vascular disease-she has  arterial peripheral vascular disease and also venous stasis disease.  11. Peripheral neuropathy  12. Cognitive impairment- she has had intermittent cognitive impairment that has at times appear to be medication related  13. Stage II-3 chronic kidney disease  14. Systolic/diastolic congestive heart failure  15. Atrial fibrillation/Eliquis  16.  Keflex (nausea), erm (nausea), Sulfa (nausea) intolerances.    PLAN/RECOMMENDATIONS:   Thank you for asking us to see Susan AKBAR Lyles, I recommend the followin. Continue Augmentin 875 mg PO daily  2. Continue Minocycline 100 mg PO bid  3. Continue wound care    Stef Pete MD saw and examined patient, verified hx and PE, read all radiographic studies, reviewed labs and micro data, and formulated dx, plan for treatment and all medical decision making.      Neeraj Bernstein PA-C for MD Stef Coleman MD  10/7/2022  19:21 EDT

## 2022-10-07 NOTE — PLAN OF CARE
Goal Outcome Evaluation:              Outcome Evaluation: Pt had no complaints overnight. One time dose of benadryl 25mg given for generalized itching with little relief. HR range from  while sleeping throughout night. NPO since midnight. Will continue to monitor.

## 2022-10-07 NOTE — ANESTHESIA PREPROCEDURE EVALUATION
Anesthesia Evaluation                  Airway   Mallampati: II  Dental      Pulmonary    (+) asthma,sleep apnea,   Cardiovascular     (+) hypertension, CAD, CHF ,       Neuro/Psych  GI/Hepatic/Renal/Endo    (+)  GI bleeding , diabetes mellitus,     Musculoskeletal     Abdominal    Substance History      OB/GYN          Other                        Anesthesia Plan    ASA 3     intravenous induction     Anesthetic plan, risks, benefits, and alternatives have been provided, discussed and informed consent has been obtained with: patient.        CODE STATUS:    While under anesthesia and immediate perioperative period:  Code Status: CPR - Full Support

## 2022-10-07 NOTE — OUTREACH NOTE
Medical Week 2 Survey    Flowsheet Row Responses   [facility] patient discharged from? [place]   Does the patient have one of the following disease processes/diagnoses(primary or secondary)? Other   Week 2 attempt successful? No   Unsuccessful attempts Attempt 1   Revoke Readmitted          LAURA VIGIL - Registered Nurse

## 2022-10-08 LAB
ANION GAP SERPL CALCULATED.3IONS-SCNC: 9 MMOL/L (ref 5–15)
BH BB BLOOD EXPIRATION DATE: NORMAL
BH BB BLOOD TYPE BARCODE: 6200
BH BB DISPENSE STATUS: NORMAL
BH BB PRODUCT CODE: NORMAL
BH BB UNIT NUMBER: NORMAL
BUN SERPL-MCNC: 65 MG/DL (ref 8–23)
BUN/CREAT SERPL: 68.4 (ref 7–25)
CALCIUM SPEC-SCNC: 8.3 MG/DL (ref 8.6–10.5)
CHLORIDE SERPL-SCNC: 109 MMOL/L (ref 98–107)
CO2 SERPL-SCNC: 25 MMOL/L (ref 22–29)
CREAT SERPL-MCNC: 0.95 MG/DL (ref 0.57–1)
CROSSMATCH INTERPRETATION: NORMAL
EGFRCR SERPLBLD CKD-EPI 2021: 59.6 ML/MIN/1.73
GLUCOSE BLDC GLUCOMTR-MCNC: 189 MG/DL (ref 70–130)
GLUCOSE BLDC GLUCOMTR-MCNC: 198 MG/DL (ref 70–130)
GLUCOSE BLDC GLUCOMTR-MCNC: 66 MG/DL (ref 70–130)
GLUCOSE SERPL-MCNC: 57 MG/DL (ref 65–99)
HCT VFR BLD AUTO: 25.9 % (ref 34–46.6)
HCT VFR BLD AUTO: 27.3 % (ref 34–46.6)
HCT VFR BLD AUTO: 29.1 % (ref 34–46.6)
HCT VFR BLD AUTO: 30.7 % (ref 34–46.6)
HGB BLD-MCNC: 10.1 G/DL (ref 12–15.9)
HGB BLD-MCNC: 8.7 G/DL (ref 12–15.9)
HGB BLD-MCNC: 9.3 G/DL (ref 12–15.9)
HGB BLD-MCNC: 9.5 G/DL (ref 12–15.9)
POTASSIUM SERPL-SCNC: 4 MMOL/L (ref 3.5–5.2)
SODIUM SERPL-SCNC: 143 MMOL/L (ref 136–145)
UNIT  ABO: NORMAL
UNIT  RH: NORMAL

## 2022-10-08 PROCEDURE — A9270 NON-COVERED ITEM OR SERVICE: HCPCS | Performed by: NURSE PRACTITIONER

## 2022-10-08 PROCEDURE — 99024 POSTOP FOLLOW-UP VISIT: CPT | Performed by: THORACIC SURGERY (CARDIOTHORACIC VASCULAR SURGERY)

## 2022-10-08 PROCEDURE — 85014 HEMATOCRIT: CPT | Performed by: INTERNAL MEDICINE

## 2022-10-08 PROCEDURE — 63710000001 BUMETANIDE 1 MG TABLET: Performed by: INTERNAL MEDICINE

## 2022-10-08 PROCEDURE — G0378 HOSPITAL OBSERVATION PER HR: HCPCS

## 2022-10-08 PROCEDURE — 63710000001 HYDROCODONE-ACETAMINOPHEN 5-325 MG TABLET: Performed by: NURSE PRACTITIONER

## 2022-10-08 PROCEDURE — 63710000001 MINOCYCLINE 50 MG CAPSULE: Performed by: NURSE PRACTITIONER

## 2022-10-08 PROCEDURE — 63710000001 GUAIFENESIN 600 MG TABLET SUSTAINED-RELEASE 12 HOUR: Performed by: NURSE PRACTITIONER

## 2022-10-08 PROCEDURE — 97597 DBRDMT OPN WND 1ST 20 CM/<: CPT

## 2022-10-08 PROCEDURE — 97162 PT EVAL MOD COMPLEX 30 MIN: CPT

## 2022-10-08 PROCEDURE — 80048 BASIC METABOLIC PNL TOTAL CA: CPT | Performed by: INTERNAL MEDICINE

## 2022-10-08 PROCEDURE — 63710000001 METOPROLOL TARTRATE 12.5 MG TABLET: Performed by: NURSE PRACTITIONER

## 2022-10-08 PROCEDURE — 97166 OT EVAL MOD COMPLEX 45 MIN: CPT

## 2022-10-08 PROCEDURE — 63710000001 HYDRALAZINE 25 MG TABLET: Performed by: INTERNAL MEDICINE

## 2022-10-08 PROCEDURE — 82962 GLUCOSE BLOOD TEST: CPT

## 2022-10-08 PROCEDURE — 99232 SBSQ HOSP IP/OBS MODERATE 35: CPT | Performed by: INTERNAL MEDICINE

## 2022-10-08 PROCEDURE — 99213 OFFICE O/P EST LOW 20 MIN: CPT | Performed by: INTERNAL MEDICINE

## 2022-10-08 PROCEDURE — 63710000001 INSULIN LISPRO (HUMAN) PER 5 UNITS: Performed by: NURSE PRACTITIONER

## 2022-10-08 PROCEDURE — 63710000001 HYDROXYZINE 25 MG TABLET: Performed by: INTERNAL MEDICINE

## 2022-10-08 PROCEDURE — 97530 THERAPEUTIC ACTIVITIES: CPT

## 2022-10-08 PROCEDURE — A9270 NON-COVERED ITEM OR SERVICE: HCPCS | Performed by: INTERNAL MEDICINE

## 2022-10-08 PROCEDURE — 85018 HEMOGLOBIN: CPT | Performed by: INTERNAL MEDICINE

## 2022-10-08 PROCEDURE — 63710000001 SENNOSIDES-DOCUSATE 8.6-50 MG TABLET: Performed by: NURSE PRACTITIONER

## 2022-10-08 PROCEDURE — 63710000001 ATORVASTATIN 40 MG TABLET: Performed by: NURSE PRACTITIONER

## 2022-10-08 PROCEDURE — 63710000001 LACTOBACILLUS ACIDOPHILUS CAPSULE: Performed by: NURSE PRACTITIONER

## 2022-10-08 PROCEDURE — 63710000001 VITAMIN B-12 100 MCG TABLET: Performed by: NURSE PRACTITIONER

## 2022-10-08 PROCEDURE — 63710000001 AMOXICILLIN-CLAVULANATE 875-125 MG TABLET: Performed by: NURSE PRACTITIONER

## 2022-10-08 RX ORDER — HYDRALAZINE HYDROCHLORIDE 25 MG/1
25 TABLET, FILM COATED ORAL EVERY 8 HOURS SCHEDULED
Status: DISCONTINUED | OUTPATIENT
Start: 2022-10-08 | End: 2022-10-12 | Stop reason: HOSPADM

## 2022-10-08 RX ORDER — BUMETANIDE 1 MG/1
0.5 TABLET ORAL DAILY
Status: DISCONTINUED | OUTPATIENT
Start: 2022-10-08 | End: 2022-10-12 | Stop reason: HOSPADM

## 2022-10-08 RX ORDER — HYDROXYZINE HYDROCHLORIDE 25 MG/1
25 TABLET, FILM COATED ORAL ONCE
Status: COMPLETED | OUTPATIENT
Start: 2022-10-08 | End: 2022-10-08

## 2022-10-08 RX ADMIN — HYDRALAZINE HYDROCHLORIDE 25 MG: 25 TABLET ORAL at 08:38

## 2022-10-08 RX ADMIN — MINOCYCLINE HYDROCHLORIDE 100 MG: 50 CAPSULE ORAL at 08:38

## 2022-10-08 RX ADMIN — INSULIN LISPRO 2 UNITS: 100 INJECTION, SOLUTION INTRAVENOUS; SUBCUTANEOUS at 16:59

## 2022-10-08 RX ADMIN — HYDROCODONE BITARTRATE AND ACETAMINOPHEN 1 TABLET: 5; 325 TABLET ORAL at 13:44

## 2022-10-08 RX ADMIN — ATORVASTATIN CALCIUM 40 MG: 40 TABLET, FILM COATED ORAL at 08:38

## 2022-10-08 RX ADMIN — PANTOPRAZOLE SODIUM 40 MG: 40 INJECTION, POWDER, FOR SOLUTION INTRAVENOUS at 08:38

## 2022-10-08 RX ADMIN — INSULIN LISPRO 2 UNITS: 100 INJECTION, SOLUTION INTRAVENOUS; SUBCUTANEOUS at 11:53

## 2022-10-08 RX ADMIN — HYDRALAZINE HYDROCHLORIDE 25 MG: 25 TABLET ORAL at 21:39

## 2022-10-08 RX ADMIN — GUAIFENESIN 1200 MG: 600 TABLET, EXTENDED RELEASE ORAL at 21:38

## 2022-10-08 RX ADMIN — VITAM B12 100 MCG: 100 TAB at 08:39

## 2022-10-08 RX ADMIN — Medication 12.5 MG: at 21:39

## 2022-10-08 RX ADMIN — Medication 10 ML: at 08:39

## 2022-10-08 RX ADMIN — Medication 12.5 MG: at 08:38

## 2022-10-08 RX ADMIN — SENNOSIDES AND DOCUSATE SODIUM 2 TABLET: 50; 8.6 TABLET ORAL at 08:38

## 2022-10-08 RX ADMIN — SODIUM CHLORIDE 125 ML/HR: 9 INJECTION, SOLUTION INTRAVENOUS at 13:52

## 2022-10-08 RX ADMIN — GUAIFENESIN 1200 MG: 600 TABLET, EXTENDED RELEASE ORAL at 08:38

## 2022-10-08 RX ADMIN — BUMETANIDE 0.5 MG: 1 TABLET ORAL at 08:38

## 2022-10-08 RX ADMIN — PANTOPRAZOLE SODIUM 40 MG: 40 INJECTION, POWDER, FOR SOLUTION INTRAVENOUS at 21:39

## 2022-10-08 RX ADMIN — Medication 1 CAPSULE: at 08:38

## 2022-10-08 RX ADMIN — AMOXICILLIN AND CLAVULANATE POTASSIUM 1 TABLET: 875; 125 TABLET, FILM COATED ORAL at 21:39

## 2022-10-08 RX ADMIN — HYDROXYZINE HYDROCHLORIDE 25 MG: 25 TABLET, FILM COATED ORAL at 13:52

## 2022-10-08 RX ADMIN — Medication 10 ML: at 21:39

## 2022-10-08 NOTE — PROGRESS NOTES
"GI Daily Progress Note  Subjective     Susan Anderson is a 82 y.o. female who was admitted with GIB (gastrointestinal bleeding).   Denies melena.  No abd pain. No appetite    Chief Complaint:  Dark stools and weakness    Objective     /58 (BP Location: Left arm, Patient Position: Lying)   Pulse 67   Temp 97.1 °F (36.2 °C) (Oral)   Resp 16   Ht 160 cm (63\")   Wt 71.2 kg (157 lb)   SpO2 94%   BMI 27.81 kg/m²     Intake/Output last 3 shifts:  I/O last 3 completed shifts:  In: 3008 [I.V.:2200; Blood:808]  Out: 1350 [Urine:1350]  Intake/Output this shift:  I/O this shift:  In: 480 [P.O.:480]  Out: -       Physical Exam  Wt Readings from Last 3 Encounters:   10/06/22 71.2 kg (157 lb)   09/22/22 75 kg (165 lb 5.5 oz)   09/12/22 75.8 kg (167 lb)   ,body mass index is 27.81 kg/m².,@FLOWAMB(6)@,@FLOWAMB(5)@,@FLOWAMB(8)@   CONSTITUTIONAL:lying in bed in no distress  Resp CTA; no rhonchi, rales, or wheezes.  Respiration effort normal  CV RRR; no M/R/G. No lower extremity edema  GI Abd soft, NT, ND, normal active bowel sounds.    Psych: Awake alert and oriented    DATA:   Latest Reference Range & Units 09/26/22 04:00 09/29/22 05:20 09/30/22 11:47 10/06/22 14:50 10/07/22 09:53 10/08/22 04:30   BUN 8 - 23 mg/dL 28 (H) 34 (H) 39 (H) 93 (H) 82 (H) 65 (H)   (H): Data is abnormally high     Latest Reference Range & Units 10/07/22 09:53 10/07/22 18:40 10/08/22 00:30 10/08/22 04:20 10/08/22 12:33   Hemoglobin 12.0 - 15.9 g/dL 6.8 (C) 9.7 (L) 8.7 (L) 9.5 (L) 9.3 (L)   Hematocrit 34.0 - 46.6 % 20.7 (C) 28.3 (L) 25.9 (L) 29.1 (L) 27.3 (L)   (C): Data is critically low  (L): Data is abnormally low    Assessment & Plan     1. Gastric ulcers with bleed  2. Acute blood loss anemia  3.DM  4. A fib    Bleeding appears improved based on Hg and BUN    REC   Continue PPI.  Await bx  Hold Plavix till Monday  Continue b ASA              GIB (gastrointestinal bleeding)    Diabetic foot ulcer (HCC)    PVD (peripheral vascular disease) " (HCC)    Diabetes mellitus with neuropathy (HCC)    Essential hypertension    Stage 3a chronic kidney disease (HCC)    NICM (nonischemic cardiomyopathy) (HCC)    Coronary artery disease involving native coronary artery of native heart without angina pectoris    Dyslipidemia    Chronic combined systolic and diastolic heart failure (HCC)    Degeneration of lumbar or lumbosacral intervertebral disc    Anemia, chronic disease    Demand ischemia (HCC)    Sleep apnea    Vasovagal syncope    Paroxysmal atrial fibrillation (HCC)       LOS: 1 day     Stef Veras MD  10/08/22  16:35 EDT

## 2022-10-08 NOTE — PLAN OF CARE
Goal Outcome Evaluation:  Plan of Care Reviewed With: patient           Outcome Evaluation: OT kevin complete. Pt presents w/ decreased activity tolerance, generalized weakness, and mild balance deficits limiting her ADL independence. Pt CGA for STS & SPT from chair-to-bed w/ RW, min Ax1 for sit-to-supine transfer, dep for LB ADLs. Pt would benefit from continued skilled IPOT services to address current functional deficits. Rec home w/ A, caregivers, and OP PT at d/c.

## 2022-10-08 NOTE — PLAN OF CARE
Goal Outcome Evaluation:  Plan of Care Reviewed With: patient           Outcome Evaluation: Physical therapy consult received. PT evaluation complete. The patient required Cody for supine to sit transfer from elevated HOB. Patient initially with complaints of dizziness that improved with time seated at EOB. Patient required CGA for sit to stand transfer and CGA with RW to complete stand pivot transfer from EOB to chair. Patient presents with strength, balance and activity tolerance deficits. At hospital discharge recommend patient return home with family/caregiver assistance and outpatient PT. Patient owns all needed DME.

## 2022-10-08 NOTE — PROGRESS NOTES
[facility] at [facility]  IP Progress Note      Chief Complaint/Reason for service: 1 GI bleed in a patient on anticoagulation and DAPT due to recent lower extremity stenting #2 history of atrial fibrillation #3 history of heart failure with preserved EF    Subjective   Subjective: The patient is awake and alert eating breakfast.  She denies chest pain or shortness of breath.  She states she feels better.    Past medical, surgical, social and family history reviewed in the patient's electronic medical record.    Objective     Vital Sign Min/Max for last 24 hours  Temp  Min: 96 °F (35.6 °C)  Max: 97.4 °F (36.3 °C)   BP  Min: 114/56  Max: 154/91   Pulse  Min: 54  Max: 75   Resp  Min: 12  Max: 18   SpO2  Min: 96 %  Max: 100 %   Flow (L/min)  Min: 4  Max: 4      Intake/Output Summary (Last 24 hours) at 10/8/2022 0900  Last data filed at 10/8/2022 0032  Gross per 24 hour   Intake 2008 ml   Output 850 ml   Net 1158 ml             Current Facility-Administered Medications:   •  acetaminophen (TYLENOL) tablet 650 mg, 650 mg, Oral, Q6H PRN, Linda Lakhani APRN  •  amoxicillin-clavulanate (AUGMENTIN) 875-125 MG per tablet 1 tablet, 1 tablet, Oral, Q24H, Linda Lakhani APRN, 1 tablet at 10/07/22 2230  •  atorvastatin (LIPITOR) tablet 40 mg, 40 mg, Oral, Daily, Linda Lakhani APRN, 40 mg at 10/08/22 0838  •  bisacodyl (DULCOLAX) suppository 10 mg, 10 mg, Rectal, Daily PRN, Linda Lakhani, APRN  •  bumetanide (BUMEX) tablet 0.5 mg, 0.5 mg, Oral, Daily, Fortino Constantino MD, 0.5 mg at 10/08/22 0838  •  dextrose (D50W) (25 g/50 mL) IV injection 25 g, 25 g, Intravenous, Q15 Min PRN, Linda Lakhani, APRN  •  dextrose (GLUTOSE) oral gel 15 g, 15 g, Oral, Q15 Min PRN, Lakhani, Linda R, APRN  •  glucagon (human recombinant) (GLUCAGEN DIAGNOSTIC) injection 1 mg, 1 mg, Intramuscular, Q15 Min PRN, Linda Lakhani, INDIGO  •  guaiFENesin (MUCINEX) 12 hr tablet 1,200 mg, 1,200 mg, Oral, BID, Kar  Linda R, APRN, 1,200 mg at 10/08/22 0838  •  hydrALAZINE (APRESOLINE) tablet 25 mg, 25 mg, Oral, Q8H, Fortino Constantino MD, 25 mg at 10/08/22 0838  •  HYDROcodone-acetaminophen (NORCO) 5-325 MG per tablet 1 tablet, 1 tablet, Oral, Q6H PRN, Mann, Jessica, APRN, 1 tablet at 10/07/22 2226  •  Insulin Lispro (humaLOG) injection 0-9 Units, 0-9 Units, Subcutaneous, TID AC, Kar Linda OLIVIA, APRN  •  lactobacillus acidophilus (RISAQUAD) capsule 1 capsule, 1 capsule, Oral, Daily, Linda Lakhani APRN, 1 capsule at 10/08/22 0838  •  magnesium sulfate 4 gram infusion - Mg less than or equal to 1mg/dL, 4 g, Intravenous, PRN **OR** magnesium sulfate 3 gram infusion (1gm x 3) - Mg 1.1 - 1.5 mg/dL, 1 g, Intravenous, PRN **OR** Magnesium Sulfate 2 gram infusion- Mg 1.6 - 1.9 mg/dL, 2 g, Intravenous, PRN, Leni Lakhanica R, APRN  •  metoprolol tartrate (LOPRESSOR) half tablet 12.5 mg, 12.5 mg, Oral, Q12H, Linda Lakhani, APRN, 12.5 mg at 10/08/22 0838  •  minocycline (MINOCIN,DYNACIN) capsule 100 mg, 100 mg, Oral, Q12H, Linda Lakhani, APRN, 100 mg at 10/08/22 0838  •  ondansetron (ZOFRAN) injection 4 mg, 4 mg, Intravenous, Q6H PRN, Linda Lakhani, APRN  •  pantoprazole (PROTONIX) injection 40 mg, 40 mg, Intravenous, Q12H, Linda Lakhani, APRN, 40 mg at 10/08/22 0838  •  sennosides-docusate (PERICOLACE) 8.6-50 MG per tablet 2 tablet, 2 tablet, Oral, BID, Linda Lakhani APRN, 2 tablet at 10/08/22 0838  •  sodium chloride 0.9 % flush 10 mL, 10 mL, Intravenous, PRN, Rufus, Jah Nova MD  •  sodium chloride 0.9 % flush 10 mL, 10 mL, Intravenous, Q12H, Linda Lakhani APRN, 10 mL at 10/08/22 0839  •  sodium chloride 0.9 % flush 10 mL, 10 mL, Intravenous, PRN, Linda Lakhani APRN  •  sodium chloride 0.9 % infusion, 125 mL/hr, Intravenous, Continuous, Trey Solitario APRN, Last Rate: 125 mL/hr at 10/07/22 2200, 125 mL/hr at 10/07/22 2200  •  vitamin B-12 (CYANOCOBALAMIN) tablet 100 mcg, 100 mcg, Oral, Daily,  Linda Lakhani, APRN, 100 mcg at 10/08/22 0839    Physical Exam: General well-developed elderly female not dyspneic not tachypneic current heart rate 80        HEENT: No JVP no icterus       Respiratory: Equal bilateral symmetrical expansion with few crackles at the base       Cardiovascular: Regular rate and rhythm without murmur gallop or click and no edema to palpation       GI: Soft and flat       Lower Extremities: Patient has a foot wound which is healing       Neuro: Facial expressions are symmetrical moves all 4 extremities       Skin: Warm and dry no edema to palpation       Psych: Pleasant affect    Results Review: EGD showed 2 9 bleeding ulcers.  Patient is a net 1.6 L ahead.  Heart rate 63-74.  Blood pressure stable at 120 and 154.  GFR 59.6 the hemoglobin is now up to 9.5.    Radiology Results:  Imaging Results (Last 72 Hours)     Procedure Component Value Units Date/Time    XR Chest 1 View [[id number]] Collected: 10/06/22 1612     Updated: 10/06/22 1616    Narrative:      DATE OF EXAM:  10/6/2022 4:02 PM     PROCEDURE:  XR CHEST 1 VW-     INDICATIONS:  Syncope.      COMPARISON:  08/26/2022.     TECHNIQUE:   Single radiographic view of the chest was obtained.     FINDINGS:  The heart size is normal. The pulmonary vascular markings are normal.  The lungs and pleural spaces are clear of active disease.  There are  chronic age-related changes involving the bony thorax and thoracic  aorta.       Impression:      No active disease.     This report was finalized on 10/6/2022 4:13 PM by Juliano Burgos MD.       CT Head Without Contrast [[id number]] Collected: 10/06/22 1544     Updated: 10/06/22 1548    Narrative:      DATE OF EXAM: 10/6/2022 3:02 PM     PROCEDURE: CT HEAD WO CONTRAST-     INDICATIONS: syncope     COMPARISON: 7/26/2022     TECHNIQUE: Routine transaxial and coronal reconstruction images were  obtained through the head without the administration of contrast.  Automated exposure control and  iterative reconstruction methods were  used.     The radiation dose reduction device was turned on for each scan per the  ALARA (As Low as Reasonably Achievable) protocol.     FINDINGS: Gray-white differentiation is maintained and there is no  evidence of intracranial hemorrhage, mass or mass effect. Age-related  changes of the brain are present including volume loss and typical  periventricular sequela of chronic small vessel ischemia. There is  otherwise no evidence of intracranial hemorrhage, mass or mass effect.  The ventricles are normal in size and configuration accounting for  surrounding volume loss. The orbits are normal and the paranasal sinuses  are grossly clear.       Impression:      Age-related changes of the brain as above, otherwise without  evidence of acute intracranial abnormality.        This report was finalized on 10/6/2022 3:45 PM by Babatunde Dinero.       CT Abdomen Pelvis Without Contrast [[id number]] Collected: 10/06/22 1523     Updated: 10/06/22 1533    Narrative:      DATE OF EXAM: 10/6/2022 3:02 PM     PROCEDURE: CT ABDOMEN PELVIS WO CONTRAST-     INDICATIONS: abd pain nv     COMPARISON: CT abdomen and pelvis 9/21/2022     TECHNIQUE: Routine transaxial slices were obtained through the abdomen  and pelvis without the administration of intravenous contrast.  Reconstructed coronal and sagittal images were also obtained. Automated  exposure control and iterative construction methods were used.     The radiation dose reduction device was turned on for each scan per the  ALARA (As Low as Reasonably Achievable) protocol.     FINDINGS:  Lung bases are grossly clear with similar peripheral  reticulations/chronic interstitial changes. Unremarkable appearance of  the liver. The gallbladder is surgically absent. Normal appearance of  the bile ducts. Unremarkable appearance of the spleen and pancreas.  Normal appearance of the adrenal glands, kidneys, ureters, and bladder.  The uterus is surgically  absent. There is some inspissated stool in the  rectum. No evidence of bowel obstruction or definite inflammatory change  of the GI tract. No abdominopelvic free fluid or conspicuous fat  stranding. No pneumoperitoneum. Scattered air within the subcutaneous  tissues of the anterior mid abdomen, nonspecific, possibly reflecting  sites of injection. There is atherosclerosis without aneurysm. No  lymphadenopathy. No acute osseous findings.        Impression:      No significant interval change. No acute abdominopelvic findings.     This report was finalized on 10/6/2022 3:29 PM by Kings Baez MD.             EKG: Sinus rhythm normal QTC    ECHO: Previously known normal EF    Tele: Sinus rhythm    Assessment   Assessment/Plan: 1 patient presents with GI bleeding.  EGD reveals 2 nonbleeding ulcers.  She has been started on IV PPI.  With the patient's history of atrial fibrillation she will need anticoagulation for stroke prophylaxis.  I discussed the case with Dr. Pastor from GI.  He advises that we wait till Monday to restart anticoagulation.  2 recent femoral artery stenting-the patient should not be on DAPT plus Eliquis.  I would recommend Plavix and Eliquis and leave the aspirin off.  Her stent should be protected with use of these 2 medications  Monitor volume status since the patient had significant volume overload in the past.    Mehran Camarena MD  10/08/22  09:00 EDT

## 2022-10-08 NOTE — THERAPY EVALUATION
Patient Name: Susan Anderson  : 1940    MRN: [id number]                              Today's Date: 10/8/2022       Admit Date: 10/6/2022    Visit Dx:     ICD-10-CM ICD-9-CM   1. Syncope, unspecified syncope type  R55 780.2   2. Anemia, unspecified type  D64.9 285.9   3. LACEY (acute kidney injury) ([facility])  N17.9 584.9   4. Elevated troponin  R77.8 790.6   5. Anticoagulated  Z79.01 V58.61   6. Gastrointestinal hemorrhage, unspecified gastrointestinal hemorrhage type  K92.2 578.9     Patient Active Problem List   Diagnosis   • Diabetic foot ulcer ([facility])   • PVD (peripheral vascular disease) ([facility])   • Osteomyelitis ([facility])   • Diabetes mellitus with neuropathy ([facility])   • Essential hypertension   • Stage 3a chronic kidney disease ([facility])   • Pyogenic inflammation of bone ([facility])   • Hyperglycemia   • Pneumonia due to infectious organism   • Mitral valve disease   • NICM (nonischemic cardiomyopathy) ([facility])   • Coronary artery disease involving native coronary artery of native heart without angina pectoris   • Dyslipidemia   • Chronic combined systolic and diastolic heart failure ([facility])   • Lumbar stenosis with neurogenic claudication   • Spondylosis of lumbar region without myelopathy or radiculopathy   • Spondylolisthesis, lumbar region   • Degeneration of lumbar or lumbosacral intervertebral disc   • Gait disturbance   • Physical deconditioning   • Sarcopenia   • Weakness   • Anemia, chronic disease   • Fall   • Dizziness   • Demand ischemia ([facility])   • Effusion of right knee   • Right knee pain   • Pressure injury of skin of sacral region   • Sleep apnea   • GIB (gastrointestinal bleeding)   • Vasovagal syncope   • Hypomagnesemia   • Syncope, unspecified syncope type   • Paroxysmal atrial fibrillation ([facility])     Past Medical History:   Diagnosis Date   • Arthritis    • Asthma  - double pneumonia    Currently on inhaler and nebulizer   • Atrial fibrillation ([facility]) 2022   • Cancer ([facility])     cervical cancer, skin cancer    • CHF (congestive heart failure) ([facility]) June 4, 2021   • Chronic kidney disease Related to diabetes   • Coronary artery disease 6/4/2021 DX for hear failure   • Diabetes mellitus (HCC) 30 years    Seeing Dr. Lam 1st time Aug 19   • Gout    • Hx of colonoscopy    • Hyperlipidemia Reference current labs x 2-3yrs approx   • Hypertension 30 years   • Migraine    • Mitral valve disease    • Mitral valve disease    • Osteomyelitis ([facility])    • Peripheral neuropathy    • Sleep apnea    • Type 2 diabetes mellitus (HCC)     30 years     Past Surgical History:   Procedure Laterality Date   • ABDOMINAL HYSTERECTOMY W/SALPINGECTOMY     • AMPUTATION  Right great toe, 1st 1/3 metatarsal -[place] 4/14/21   • AORTAGRAM N/A 4/9/2021    Procedure: AORTAGRAM WITH OR WITHOUT RUNOFFS, WITH Co2;  Surgeon: Trey Forrest MD;  Location:  [facility];  Service: Vascular;  Laterality: N/A;   • AORTAGRAM N/A 9/28/2022    Procedure: CO2 ANGIOGRAM, LEFT SFA ANGIOPLASTY WITH DRUG ELUTING BALLOON, LEFT SFA STENT PLACEMENT ;  Surgeon: Trey Forrest MD;  Location:  [facility];  Service: Vascular;  Laterality: N/A;  FLUORO: 13.12  DOSE 162mGy  CONTRAST: Isovue 350: 15ml   • APPENDECTOMY     • BRAIN TUMOR EXCISION      laser surgery    • CARDIAC CATHETERIZATION N/A 6/21/2021    Procedure: LEFT HEART CATH;  Surgeon: Anjum Ceballos MD;  Location:  "[facility] CATH INVASIVE LOCATION;  Service: Cardiology;  Laterality: N/A;   • CATARACT EXTRACTION W/ INTRAOCULAR LENS  IMPLANT, BILATERAL     • CHOLECYSTECTOMY     • EYE SURGERY     • HYSTERECTOMY     • TOE SURGERY     • TRANS METATARSAL AMPUTATION Right 4/14/2021    Procedure: AMPUTATION TRANS METATARSAL RIGHT GREAT TOE;  Surgeon: Valdez Finney MD;  Location:  [facility];  Service: Vascular;  Laterality: Right;      General Information     Row Name 10/08/22 1000          Physical Therapy Time and Intention    Document Type evaluation  -ML     Mode of Treatment physical therapy  -ML     Row Name  10/08/22 1000          General Information    Patient Profile Reviewed yes  -ML     Prior Level of Function independent:;w/c or scooter;feeding;grooming;min assist:;all household mobility;gait;transfer;dressing;bathing  Patient reports able to complete stand pivot transfer to , mainly using WC to navigate household distances  -ML     Existing Precautions/Restrictions fall  -ML     Barriers to Rehab medically complex;previous functional deficit  -ML     Row Name 10/08/22 1000          Living Environment    People in Home alone;other (see comments)  patient reports daughter checks on her everyday and patient has 1 caregiver during the day and 1 caregiver at night  -ML     Row Name 10/08/22 1000          Home Main Entrance    Number of Stairs, Main Entrance other (see comments)  ramp  -ML     Row Name 10/08/22 1000          Stairs Within Home, Primary    Number of Stairs, Within Home, Primary none  -ML     Row Name 10/08/22 1000          Cognition    Orientation Status (Cognition) oriented x 4  -ML     Row Name 10/08/22 1000          Safety Issues, Functional Mobility    Safety Issues Affecting Function (Mobility) insight into deficits/self-awareness;safety precaution awareness;safety precautions follow-through/compliance  -ML     Impairments Affecting Function (Mobility) balance;endurance/activity tolerance;strength  -ML           User Key  (r) = Recorded By, (t) = Taken By, (c) = Cosigned By    Initials Name Provider Type    ML Janeth Cervantes Physical Therapist               Mobility     Row Name 10/08/22 1003          Bed Mobility    Bed Mobility supine-sit  -ML     Supine-Sit [place] (Bed Mobility) minimum assist (75% patient effort)  -ML     Assistive Device (Bed Mobility) bed rails;head of bed elevated  -ML     Comment, (Bed Mobility) Patient required additionaL time to complete supine to sit transfer from elevated HOB.  -ML     Row Name 10/08/22 1003          Bed-Chair Transfer    Bed-Chair [place]  (Transfers) contact guard  -ML     Assistive Device (Bed-Chair Transfers) walker, front-wheeled  -ML     Comment, (Bed-Chair Transfer) Patient complete stand pivot transfer from EOB to chair.  -ML     Row Name 10/08/22 1003          Sit-Stand Transfer    Sit-Stand [place] (Transfers) contact guard  -ML     Assistive Device (Sit-Stand Transfers) walker, front-wheeled  -ML     Row Name 10/08/22 1003          Gait/Stairs (Locomotion)    [place] Level (Gait) not tested  -ML           User Key  (r) = Recorded By, (t) = Taken By, (c) = Cosigned By    Initials Name Provider Type    ML Janeth Cervantes Physical Therapist               Obj/Interventions     Row Name 10/08/22 1004          Range of Motion Comprehensive    General Range of Motion no range of motion deficits identified  -     Row Name 10/08/22 1004          Strength Comprehensive (MMT)    General Manual Muscle Testing (MMT) Assessment lower extremity strength deficits identified  -ML     Comment, General Manual Muscle Testing (MMT) Assessment BLE at least 3/5  -ML     Row Name 10/08/22 1004          Motor Skills    Motor Skills functional endurance  -ML     Functional Endurance decreased activity tolerance  -ML     Row Name 10/08/22 1004          Balance    Balance Assessment sitting static balance;sitting dynamic balance;sit to stand dynamic balance;standing static balance;standing dynamic balance  -ML     Static Sitting Balance supervision  -ML     Dynamic Sitting Balance contact guard  -ML     Position, Sitting Balance unsupported;sitting edge of bed  -ML     Sit to Stand Dynamic Balance contact guard  -ML     Static Standing Balance contact guard  -ML     Dynamic Standing Balance contact guard  -ML     Position/Device Used, Standing Balance supported;walker, rolling  -ML     Balance Interventions sitting;standing;sit to stand;supported;static;dynamic;occupation based/functional task  -ML           User Key  (r) = Recorded By, (t) = Taken By, (c) =  Cosigned By    Initials Name Provider Type    ML Janeth Cervantes Physical Therapist               Goals/Plan     Row Name 10/08/22 1007          Bed Mobility Goal 1 (PT)    Activity/Assistive Device (Bed Mobility Goal 1, PT) sit to supine/supine to sit  -ML     [place] Level/Cues Needed (Bed Mobility Goal 1, PT) modified independence  -ML     Time Frame (Bed Mobility Goal 1, PT) 10 days  -ML     Row Name 10/08/22 1007          Transfer Goal 1 (PT)    Activity/Assistive Device (Transfer Goal 1, PT) sit-to-stand/stand-to-sit;bed-to-chair/chair-to-bed;walker, rolling  -ML     [place] Level/Cues Needed (Transfer Goal 1, PT) modified independence  -ML     Time Frame (Transfer Goal 1, PT) 10 days  -ML     Row Name 10/08/22 1007          Gait Training Goal 1 (PT)    Activity/Assistive Device (Gait Training Goal 1, PT) gait (walking locomotion);assistive device use;decrease fall risk;improve balance and speed;increase endurance/gait distance;walker, rolling  -ML     [place] Level (Gait Training Goal 1, PT) standby assist  -ML     Distance (Gait Training Goal 1, PT) 50  -ML     Time Frame (Gait Training Goal 1, PT) 10 days  -ML     Row Name 10/08/22 1007          Therapy Assessment/Plan (PT)    Planned Therapy Interventions (PT) balance training;bed mobility training;gait training;home exercise program;neuromuscular re-education;patient/family education;postural re-education;ROM (range of motion);strengthening;transfer training;wheelchair management/propulsion training  -           User Key  (r) = Recorded By, (t) = Taken By, (c) = Cosigned By    Initials Name Provider Type    Janeth Dumont Physical Therapist               Clinical Impression     Row Name 10/08/22 1005          Pain    Pretreatment Pain Rating 0/10 - no pain  -ML     Posttreatment Pain Rating 0/10 - no pain  -ML     Row Name 10/08/22 1005          Plan of Care Review    Plan of Care Reviewed With patient  -     Outcome Evaluation Physical  therapy consult received. PT evaluation complete. The patient required [place] for supine to sit transfer from elevated HOB. Patient initially with complaints of dizziness that improved with time seated at EOB. Patient required CGA for sit to stand transfer and CGA with RW to complete stand pivot transfer from EOB to chair. Patient presents with strength, balance and activity tolerance deficits. At hospital discharge recommend patient return home with family/caregiver assistance and outpatient PT. Patient owns all needed DME.  -ML     Row Name 10/08/22 1005          Therapy Assessment/Plan (PT)    Patient/Family Therapy Goals Statement (PT) return home with family/caregiver assist and outpatient PT  -ML     Rehab Potential (PT) good, to achieve stated therapy goals  -ML     Criteria for Skilled Interventions Met (PT) yes;meets criteria;skilled treatment is necessary  -ML     Therapy Frequency (PT) daily  -ML     Row Name 10/08/22 1005          Vital Signs    Pre Patient Position Supine  -ML     Intra Patient Position Standing  -ML     Post Patient Position Sitting  -ML     Row Name 10/08/22 1005          Positioning and Restraints    Pre-Treatment Position in bed  -ML     Post Treatment Position chair  -ML     In Chair notified nsg;reclined;call light within reach;encouraged to call for assist;exit alarm on;with other staff;waffle cushion;legs elevated  -ML           User Key  (r) = Recorded By, (t) = Taken By, (c) = Cosigned By    Initials Name Provider Type    Janeth Dumont Physical Therapist               Outcome Measures     Row Name 10/08/22 1010          How much help from another person do you currently need...    Turning from your back to your side while in flat bed without using bedrails? 3  -ML     Moving from lying on back to sitting on the side of a flat bed without bedrails? 3  -ML     Moving to and from a bed to a chair (including a wheelchair)? 3  -ML     Standing up from a chair using your arms (e.g.,  wheelchair, bedside chair)? 3  -ML     Climbing 3-5 steps with a railing? 2  -ML     To walk in hospital room? 2  -ML     AM-PAC 6 Clicks Score (PT) 16  -ML     Highest level of mobility 5 --> Static standing  -ML     Row Name 10/08/22 1010          Functional Assessment    Outcome Measure Options AM-PAC 6 Clicks Basic Mobility (PT)  -ML           User Key  (r) = Recorded By, (t) = Taken By, (c) = Cosigned By    Initials Name Provider Type    ML Janeth Cervantes Physical Therapist                             Physical Therapy Education     Title: PT OT SLP Therapies (In Progress)     Topic: Physical Therapy (Done)     Point: Mobility training (Done)     Learning Progress Summary           Patient Acceptance, E, VU by  at 10/8/2022 1010                   Point: Home exercise program (Done)     Learning Progress Summary           Patient Acceptance, E, VU by ML at 10/8/2022 1010                   Point: Body mechanics (Done)     Learning Progress Summary           Patient Acceptance, E, VU by ML at 10/8/2022 1010                   Point: Precautions (Done)     Learning Progress Summary           Patient Acceptance, E, VU by ML at 10/8/2022 1010                               User Key     Initials Effective Dates Name Provider Type Discipline     04/22/21 -  Janeth Cervantes Physical Therapist PT              PT Recommendation and Plan  Planned Therapy Interventions (PT): balance training, bed mobility training, gait training, home exercise program, neuromuscular re-education, patient/family education, postural re-education, ROM (range of motion), strengthening, transfer training, wheelchair management/propulsion training  Plan of Care Reviewed With: patient  Outcome Evaluation: Physical therapy consult received. PT evaluation complete. The patient required [place] for supine to sit transfer from [facility]. Patient initially with complaints of dizziness that improved with time seated at EOB. Patient required CGA for sit to  stand transfer and CGA with RW to complete stand pivot transfer from EOB to chair. Patient presents with strength, balance and activity tolerance deficits. At hospital discharge recommend patient return home with family/caregiver assistance and outpatient PT. Patient owns all needed DME.     Time Calculation:    PT Charges     Row Name 10/08/22 1011             Time Calculation    Start Time 0920  -ML      PT Received On 10/08/22  -ML      PT Goal Re-Cert Due Date 10/18/22  -ML         Timed Charges    12094 - PT Therapeutic Activity Minutes 15  -ML         Untimed Charges    PT Eval/Re-eval Minutes 46  -ML         Total Minutes    Timed Charges Total Minutes 15  -ML      Untimed Charges Total Minutes 46  -ML       Total Minutes 61  -ML            User Key  (r) = Recorded By, (t) = Taken By, (c) = Cosigned By    Initials Name Provider Type    Janeth Dumont Physical Therapist              Therapy Charges for Today     Code Description Service Date Service Provider Modifiers Qty    24622968906 HC PT THERAPEUTIC ACT EA 15 MIN 10/8/2022 Janeth Cervantes GP 1    57754596235 HC PT EVAL MOD COMPLEXITY 4 10/8/2022 Janeth Cervantes GP 1          PT G-Codes  Outcome Measure Options: AM-PAC 6 Clicks Basic Mobility (PT)  AM-PAC 6 Clicks Score (PT): 16    Janeth Cervantes  10/8/2022

## 2022-10-08 NOTE — PROGRESS NOTES
Cardiothoracic Surgery Progress Note      POD #: Drug-eluting angioplasty/stent of left SFA stenosis per Dr. Forrest/Reg on September 28, 2022     LOS: 1 day      Subjective: Admitted for GI bleeding.  EGD yesterday showed superficial gastric ulcerations proton pump inhibitor started per Dr. Veras of gastroenterology    Objective:  Vital Signs  Temp:  [96 °F (35.6 °C)-97.4 °F (36.3 °C)] 96.4 °F (35.8 °C)  Heart Rate:  [54-75] 71  Resp:  [12-18] 18  BP: (114-154)/() 154/91    Physical Exam:   General Appearance:    Lungs:   Heart:   Skin: I inspected the photograph taken by PT wound care on October 3, 2022   Incision:     Results:  Results from last 7 days   Lab Units 10/08/22  0420 10/07/22  1840 10/07/22  0953   WBC 10*3/mm3  --   --  7.10   HEMOGLOBIN g/dL 9.5*   < > 6.8*   HEMATOCRIT % 29.1*   < > 20.7*   PLATELETS 10*3/mm3  --   --  250    < > = values in this interval not displayed.     Results from last 7 days   Lab Units 10/08/22  0430   SODIUM mmol/L 143   POTASSIUM mmol/L 4.0   CHLORIDE mmol/L 109*   CO2 mmol/L 25.0   BUN mg/dL 65*   CREATININE mg/dL 0.95   GLUCOSE mg/dL 57*   CALCIUM mg/dL 8.3*         Assessment: #1.  Status post right great toe amputation in April 2021 healed well  2.  Ulceration left midfoot area of the dorsum of the great toe and second toe area stationary at this time  3 status post drug-eluting angioplasty/stent of mid SFA lesion of the left lower extremity on September September 28, 2022      Plan: Overall management GI bleeding per hospitalist/GI medicine.  We will have PT wound care evaluate and manage the chronic dorsal foot ulceration while here.  Overall management of medical problems per hospitalist.  We will sign off on daily visits.    Valdez Finney MD - 10/08/22 - 08:21 EDT

## 2022-10-08 NOTE — PROGRESS NOTES
INFECTIOUS DISEASE PROGRESS NOTE    Susan Anderson  1940  [id number]    Date of Consult: 10/7/2022    Admission Date: 10/6/2022      Requesting Provider: Jason Garcia DO  Evaluating Physician: Stef Pete MD    Reason for Consultation: Bilateral foot infections    History of present illness:    10/7/2022: Patient is a 82 y.o. female with h/o type 2 diabetes mellitus, systolic/diastolic congestive heart failure, stage II-3 chronic kidney disease, chronic right leg lymphedema, afib/Eliquis, coronary artery disease, gout, asthma, peripheral vascular disease, MSSA right hallux osteomyelitis requiring transmetatarsal amputation, cervical cancer, and a recent left dorsal foot ulcer with cellulitis who we were asked to see for reassessment of bilateral foot infections in setting of GI bleed.  She has been on prolonged oral Augmentin for residual MSSA foot infection after the infection extended over into the second metatarsal phalangeal region.  This wound has gradually improved and is now healed but she remains at high risk for persistent infection/relapse.  She developed a distal left dorsal foot ulcer earlier in 9/2022 and was placed on doxycycline along with silver dressing therapy at her nursing facility. She was hospitalized at [facility] from 9/21-9/30/22 during which she underwent left SFA angioplasty and stent placement. She was discharged on oral Augmentin and oral minocycline.  Her left dorsal foot wound look significantly better this past week in the office.      She returned to [facility] ED on 10/6 for a near syncopal event.  She was doing well over the past week.  She recently restarted Plavix.  On 10/5, she became weak and fatigued.  She has some nausea, but no fever or chills.  On 10/6, the patient went to bathroom as she felt she need to have a BM and had a near syncopal event.  On arrival, the patient is afebrile and hemodynamically stable.  Her admitting labs were WBC 9300 with 67% neutrophils, Hgb 7.9,  creatinine 1.45 (baseline around 1.2), and UA WBC TNTC with large LE/negative nitrite.  A urine culture is pending. She was transfused with 1U pRBC.  A CXR, CT scan of head, and CT scan of a/p all showed no acute findings.  She was continued on Augmentin and minocycline.     10/8/2022: She feels somewhat better today.  She has decreased dyspnea.  She denies increased left foot pain.  She denies nausea, vomiting, and abdominal pain.  She denies melena and hematochezia.  She has remained afebrile.  Her hemoglobin today is 9.3.    Past Medical History:   Diagnosis Date   • Arthritis    • Asthma 6/4 - double pneumonia    Currently on inhaler and nebulizer   • Atrial fibrillation (HCC) 8/21/2022   • Cancer (HCC)     cervical cancer, skin cancer   • CHF (congestive heart failure) (HCC) June 4, 2021   • Chronic kidney disease Related to diabetes   • Coronary artery disease 6/4/2021 DX for hear failure   • Diabetes mellitus (HCC) 30 years    Seeing Dr. Lam 1st time Aug 19   • Gout    • Hx of colonoscopy    • Hyperlipidemia Reference current labs x 2-3yrs approx   • Hypertension 30 years   • Migraine    • Mitral valve disease    • Mitral valve disease    • Osteomyelitis (HCC)    • Peripheral neuropathy    • Sleep apnea    • Type 2 diabetes mellitus (HCC)     30 years       Past Surgical History:   Procedure Laterality Date   • ABDOMINAL HYSTERECTOMY W/SALPINGECTOMY     • AMPUTATION  Right great toe, 1st 1/3 metatarsal -Reg 4/14/21   • AORTAGRAM N/A 4/9/2021    Procedure: AORTAGRAM WITH OR WITHOUT RUNOFFS, WITH Co2;  Surgeon: Trey Forrest MD;  Location: [facility];  Service: Vascular;  Laterality: N/A;   • AORTAGRAM N/A 9/28/2022    Procedure: CO2 ANGIOGRAM, LEFT SFA ANGIOPLASTY WITH DRUG ELUTING BALLOON, LEFT SFA STENT PLACEMENT ;  Surgeon: Trey Forrest MD;  Location: [facility];  Service: Vascular;  Laterality: N/A;  FLUORO: 13.12  DOSE 162mGy  CONTRAST: Isovue 350: 15ml   • APPENDECTOMY     • BRAIN  TUMOR EXCISION      laser surgery    • CARDIAC CATHETERIZATION N/A 6/21/2021    Procedure: LEFT HEART CATH;  Surgeon: Anjum Ceballos MD;  Location:  [facility];  Service: Cardiology;  Laterality: N/A;   • CATARACT EXTRACTION W/ INTRAOCULAR LENS  IMPLANT, BILATERAL     • CHOLECYSTECTOMY     • EYE SURGERY     • HYSTERECTOMY     • TOE SURGERY     • TRANS METATARSAL AMPUTATION Right 4/14/2021    Procedure: AMPUTATION TRANS METATARSAL RIGHT GREAT TOE;  Surgeon: Valdez Finney MD;  Location:  [facility] OR;  Service: Vascular;  Laterality: Right;       Family History   Problem Relation Age of Onset   • Diabetes Mother         Type II   • Heart disease Father    • Heart attack Father    • Coronary artery disease Father    • Diabetes Father         Type II   • Diabetes Sister    • Diabetes Maternal Grandmother    • Diabetes Maternal Grandfather    • Diabetes Paternal Grandmother    • Diabetes Paternal Grandfather        Social History     Socioeconomic History   • Marital status:    • Number of children: 1   Tobacco Use   • Smoking status: Never   • Smokeless tobacco: Never   Vaping Use   • Vaping Use: Never used   Substance and Sexual Activity   • Alcohol use: Yes     Comment: Social drinking when not recovering from hospitalization   • Drug use: Never   • Sexual activity: Not Currently     Birth control/protection: None       Allergies   Allergen Reactions   • Baclofen Other (See Comments) and Hallucinations     PSYCHOSIS-POA REFUSES ADMINISTRATION OF THIS MED.   • Cephalexin Nausea Only   • Erythromycin Base Nausea Only   • Melatonin Other (See Comments)     Nightmares   • Oxycodone Nausea Only   • Sulfa Antibiotics Nausea Only         Medication:    Current Facility-Administered Medications:   •  acetaminophen (TYLENOL) tablet 650 mg, 650 mg, Oral, Q6H PRN, Linda Lakhani R, APRN  •  amoxicillin-clavulanate (AUGMENTIN) 875-125 MG per tablet 1 tablet, 1 tablet, Oral, Q24H, Linda Lakhani R, APRN, 1  tablet at 10/07/22 2230  •  atorvastatin (LIPITOR) tablet 40 mg, 40 mg, Oral, Daily, Linda Lakhani APRN, 40 mg at 10/08/22 0838  •  bisacodyl (DULCOLAX) suppository 10 mg, 10 mg, Rectal, Daily PRN, Linda Lakhani APRN  •  bumetanide (BUMEX) tablet 0.5 mg, 0.5 mg, Oral, Daily, Fortino Constantino MD, 0.5 mg at 10/08/22 0838  •  dextrose (D50W) (25 g/50 mL) IV injection 25 g, 25 g, Intravenous, Q15 Min PRN, Linda Lakhani APRTDE  •  dextrose (GLUTOSE) oral gel 15 g, 15 g, Oral, Q15 Min PRN, Linda Lakhani APRN  •  glucagon (human recombinant) (GLUCAGEN DIAGNOSTIC) injection 1 mg, 1 mg, Intramuscular, Q15 Min PRN, Lidna Lakhani APRTED  •  guaiFENesin (MUCINEX) 12 hr tablet 1,200 mg, 1,200 mg, Oral, BID, Linda Lakhani APRN, 1,200 mg at 10/08/22 0838  •  hydrALAZINE (APRESOLINE) tablet 25 mg, 25 mg, Oral, Q8H, Fortino Constantino MD, 25 mg at 10/08/22 0838  •  HYDROcodone-acetaminophen (NORCO) 5-325 MG per tablet 1 tablet, 1 tablet, Oral, Q6H PRN, Jessica Darnell, APRN, 1 tablet at 10/08/22 1344  •  Insulin Lispro (humaLOG) injection 0-9 Units, 0-9 Units, Subcutaneous, TID AC, Linda Lakhani, APRN, 2 Units at 10/08/22 1153  •  lactobacillus acidophilus (RISAQUAD) capsule 1 capsule, 1 capsule, Oral, Daily, Linda Lakhani APRN, 1 capsule at 10/08/22 0838  •  magnesium sulfate 4 gram infusion - Mg less than or equal to 1mg/dL, 4 g, Intravenous, PRN **OR** magnesium sulfate 3 gram infusion (1gm x 3) - Mg 1.1 - 1.5 mg/dL, 1 g, Intravenous, PRN **OR** Magnesium Sulfate 2 gram infusion- Mg 1.6 - 1.9 mg/dL, 2 g, Intravenous, PRN, Linda Lakhani, APRN  •  metoprolol tartrate (LOPRESSOR) half tablet 12.5 mg, 12.5 mg, Oral, Q12H, Linda Lakhani, APRN, 12.5 mg at 10/08/22 0838  •  minocycline (MINOCIN,DYNACIN) capsule 100 mg, 100 mg, Oral, Q12H, Linda Lakhani R, APRN, 100 mg at 10/08/22 0838  •  ondansetron (ZOFRAN) injection 4 mg, 4 mg, Intravenous, Q6H PRN, Linda Lakhani, APRN  •  pantoprazole  (PROTONIX) injection 40 mg, 40 mg, Intravenous, Q12H, Linda Lakhani, APRN, 40 mg at 10/08/22 0838  •  sennosides-docusate (PERICOLACE) 8.6-50 MG per tablet 2 tablet, 2 tablet, Oral, BID, Linda Lakhani, APRN, 2 tablet at 10/08/22 0838  •  sodium chloride 0.9 % flush 10 mL, 10 mL, Intravenous, PRN, Jah Hooper MD  •  sodium chloride 0.9 % flush 10 mL, 10 mL, Intravenous, Q12H, Linda Lakhani, APRN, 10 mL at 10/08/22 0839  •  sodium chloride 0.9 % flush 10 mL, 10 mL, Intravenous, PRN, Linda Lakhani APRN  •  sodium chloride 0.9 % infusion, 125 mL/hr, Intravenous, Continuous, Trey Solitario APRN, Last Rate: 125 mL/hr at 10/08/22 1352, 125 mL/hr at 10/08/22 1352  •  vitamin B-12 (CYANOCOBALAMIN) tablet 100 mcg, 100 mcg, Oral, Daily, Linda Lakhani APRN, 100 mcg at 10/08/22 0839    Antibiotics:  Anti-Infectives (From admission, onward)    Ordered     Dose/Rate Route Frequency Start Stop    10/06/22 2058  amoxicillin-clavulanate (AUGMENTIN) 875-125 MG per tablet 1 tablet        Ordering Provider: Linda Lakhani APRN    1 tablet Oral Every 24 Hours 10/06/22 2200 10/28/22 2159    10/06/22 2058  minocycline (MINOCIN,DYNACIN) capsule 100 mg        Ordering Provider: Linda Lakhani APRN    100 mg Oral Every 12 Hours Scheduled 10/06/22 2145 10/13/22 2059            Review of Systems:  She is more alert and less confused.  She still cannot provide a reliable ROS due to cognitive decline      Physical Exam:   Vital Signs  Temp (24hrs), Av °F (36.1 °C), Min:96.4 °F (35.8 °C), Max:97.4 °F (36.3 °C)    Temp  Min: 96.4 °F (35.8 °C)  Max: 97.4 °F (36.3 °C)  BP  Min: 114/57  Max: 154/91  Pulse  Min: 54  Max: 84  Resp  Min: 12  Max: 18  SpO2  Min: 96 %  Max: 100 %    GENERAL: Awake, appears confused, in no acute distress.   HEENT: Normocephalic, atraumatic.  PERRL. EOMI. No conjunctival injection. No icterus. Oropharynx clear without evidence of thrush or exudate.  NECK: Supple   HEART: RRR; No  murmur, rubs, gallops.   LUNGS: Clear to auscultation bilaterally without wheezing, rales, rhonchi. Normal respiratory effort. Nonlabored.  ABDOMEN: Soft, nontender, nondistended. . No rebound or guarding. NO mass or HSM.  EXT:  No cyanosis, clubbing or edema. No cord.  :  Without Bui catheter.  MSK:  FROM, no joint effusions.   SKIN: Warm and dry without cutaneous eruptions on Inspection/palpation.  Left dorsal foot with silver dressing. This wound appears substantially improved with increased granulation tissue and some reepithelialization along the wound margins.  The wound is now approximately 2-2.5 cm in diameter and 3 mm deep. No surrounding erythema.   NEURO: Awake, confused, not oriented.  PSYCHIATRIC: Cooperative with PE    Laboratory Data    Results from last 7 days   Lab Units 10/08/22  1233 10/08/22  0420 10/08/22  0030 10/07/22  1840 10/07/22  0953 10/06/22  1450   WBC 10*3/mm3  --   --   --   --  7.10 9.26   HEMOGLOBIN g/dL 9.3* 9.5* 8.7*   < > 6.8* 7.9*   HEMATOCRIT % 27.3* 29.1* 25.9*   < > 20.7* 23.8*   PLATELETS 10*3/mm3  --   --   --   --  250 333    < > = values in this interval not displayed.     Results from last 7 days   Lab Units 10/08/22  0430   SODIUM mmol/L 143   POTASSIUM mmol/L 4.0   CHLORIDE mmol/L 109*   CO2 mmol/L 25.0   BUN mg/dL 65*   CREATININE mg/dL 0.95   GLUCOSE mg/dL 57*   CALCIUM mg/dL 8.3*     Results from last 7 days   Lab Units 10/07/22  0953   ALK PHOS U/L 145*   BILIRUBIN mg/dL 0.7   ALT (SGPT) U/L 6   AST (SGOT) U/L 17                         Estimated Creatinine Clearance: 42.4 mL/min (by C-G formula based on SCr of 0.95 mg/dL).      Microbiology:  Urine cx NGSF          Radiology:  Imaging Results (Last 72 Hours)     Procedure Component Value Units Date/Time    XR Chest 1 View [[id number]] Collected: 10/06/22 1612     Updated: 10/06/22 1616    Narrative:      DATE OF EXAM:  10/6/2022 4:02 PM     PROCEDURE:  XR CHEST 1 VW-     INDICATIONS:  Syncope.       COMPARISON:  08/26/2022.     TECHNIQUE:   Single radiographic view of the chest was obtained.     FINDINGS:  The heart size is normal. The pulmonary vascular markings are normal.  The lungs and pleural spaces are clear of active disease.  There are  chronic age-related changes involving the bony thorax and thoracic  aorta.       Impression:      No active disease.     This report was finalized on 10/6/2022 4:13 PM by Juliano Burgos MD.       CT Head Without Contrast [[id number]] Collected: 10/06/22 1544     Updated: 10/06/22 1548    Narrative:      DATE OF EXAM: 10/6/2022 3:02 PM     PROCEDURE: CT HEAD WO CONTRAST-     INDICATIONS: syncope     COMPARISON: 7/26/2022     TECHNIQUE: Routine transaxial and coronal reconstruction images were  obtained through the head without the administration of contrast.  Automated exposure control and iterative reconstruction methods were  used.     The radiation dose reduction device was turned on for each scan per the  ALARA (As Low as Reasonably Achievable) protocol.     FINDINGS: Gray-white differentiation is maintained and there is no  evidence of intracranial hemorrhage, mass or mass effect. Age-related  changes of the brain are present including volume loss and typical  periventricular sequela of chronic small vessel ischemia. There is  otherwise no evidence of intracranial hemorrhage, mass or mass effect.  The ventricles are normal in size and configuration accounting for  surrounding volume loss. The orbits are normal and the paranasal sinuses  are grossly clear.       Impression:      Age-related changes of the brain as above, otherwise without  evidence of acute intracranial abnormality.        This report was finalized on 10/6/2022 3:45 PM by Babatunde Dinero.       CT Abdomen Pelvis Without Contrast [[id number]] Collected: 10/06/22 1523     Updated: 10/06/22 1533    Narrative:      DATE OF EXAM: 10/6/2022 3:02 PM     PROCEDURE: CT ABDOMEN PELVIS WO CONTRAST-      INDICATIONS: abd pain nv     COMPARISON: CT abdomen and pelvis 9/21/2022     TECHNIQUE: Routine transaxial slices were obtained through the abdomen  and pelvis without the administration of intravenous contrast.  Reconstructed coronal and sagittal images were also obtained. Automated  exposure control and iterative construction methods were used.     The radiation dose reduction device was turned on for each scan per the  ALARA (As Low as Reasonably Achievable) protocol.     FINDINGS:  Lung bases are grossly clear with similar peripheral  reticulations/chronic interstitial changes. Unremarkable appearance of  the liver. The gallbladder is surgically absent. Normal appearance of  the bile ducts. Unremarkable appearance of the spleen and pancreas.  Normal appearance of the adrenal glands, kidneys, ureters, and bladder.  The uterus is surgically absent. There is some inspissated stool in the  rectum. No evidence of bowel obstruction or definite inflammatory change  of the GI tract. No abdominopelvic free fluid or conspicuous fat  stranding. No pneumoperitoneum. Scattered air within the subcutaneous  tissues of the anterior mid abdomen, nonspecific, possibly reflecting  sites of injection. There is atherosclerosis without aneurysm. No  lymphadenopathy. No acute osseous findings.        Impression:      No significant interval change. No acute abdominopelvic findings.     This report was finalized on 10/6/2022 3:29 PM by Kings Baez MD.               Impression:   1. Left dorsal foot ulcer/wound infection- Wound is now dramatically better after vascular intervention.  She underwent vascular intervention on 9/28. Her left dorsal foot ulcer appears significantly improved.  This has continued to improve.  I will plan to discontinue her minocycline.  2. MSSA right foot osteomyelitis- she had an extensive, limb threatening right foot infection which finally healed after multiple surgical interventions and prolonged  intravenous followed by oral antibiotic therapy.  I have considered her a very high risk for relapse of her MSSA right foot osteomyelitis and I have left her on chronic oral antibiotic suppression.  If she develops significant antibiotic side effects from Augmentin, we may need to discontinue the Augmentin. Her foot is now markedly improved.  3. Acute GI bleed.  NEW  4. Pyuria, possible UTI vs colonization.   5. Anemia secondary to GI bleed. 1U pRBC transfusion 10/6.  6. Near syncopal event  7. Acute renal insufficiency. ATN vs dehydration.   8. History of right knee hematoma-this was quite extensive and required hospitalization in 8/22.  This is now resolved.  9. Type 2 diabetes mellitus-this increases her risk for recurrent infections  10. Peripheral vascular disease-she has arterial peripheral vascular disease and also venous stasis disease.  11. Peripheral neuropathy  12. Cognitive impairment- she has had intermittent cognitive impairment that has at times appear to be medication related  13. Stage II-3 chronic kidney disease  14. Systolic/diastolic congestive heart failure  15. Atrial fibrillation/Eliquis  16.  Keflex (nausea), erm (nausea), Sulfa (nausea) intolerances.    PLAN/RECOMMENDATIONS:   1. Continue Augmentin 875 mg PO daily  2. Discontinue minocycline  3. Continue wound care             Stef Pete MD  10/8/2022  14:29 EDT

## 2022-10-08 NOTE — PROGRESS NOTES
[facility] Medicine Services  PROGRESS NOTE    Patient Name: Susan Anderson  : 1940  MRN: [id number]    Date of Admission: 10/6/2022  Primary Care Physician: Arleen Doherty MD    Subjective   Subjective     CC: Follow-up syncope, nausea    HPI: No acute events overnight, patient stated she slept well, feels much better this morning.    ROS:  Gen- No fevers, chills  CV- No chest pain, palpitations  Resp- No cough, dyspnea  GI- No N/V/D, abd pain      Objective   Objective     Vital Signs:   Temp:  [96 °F (35.6 °C)-97.4 °F (36.3 °C)] 96.4 °F (35.8 °C)  Heart Rate:  [54-75] 71  Resp:  [12-18] 18  BP: (114-154)/() 154/91  Flow (L/min):  [4] 4     Physical Exam:  Constitutional: Chronically ill-appearing elderly lady, in no acute distress, awake, alert  HENT: NCAT, mucous membranes moist  Respiratory: Clear to auscultation bilaterally, respiratory effort normal   Cardiovascular: RRR, no murmurs, rubs, or gallops  Gastrointestinal: Positive bowel sounds, soft, nontender, nondistended  Musculoskeletal: No bilateral ankle edema  Psychiatric: Appropriate affect, cooperative  Neurologic: Nonfocal  Skin: No rashes    Results Reviewed:  LAB RESULTS:      Lab 10/08/22  0420 10/08/22  0030 10/07/22  1840 10/07/22  0953 10/06/22  1450   WBC  --   --   --  7.10 9.26   HEMOGLOBIN 9.5* 8.7* 9.7* 6.8* 7.9*   HEMATOCRIT 29.1* 25.9* 28.3* 20.7* 23.8*   PLATELETS  --   --   --  250 333   NEUTROS ABS  --   --   --  4.65 6.19   IMMATURE GRANS (ABS)  --   --   --  0.24* 0.28*   LYMPHS ABS  --   --   --  1.39 1.82   MONOS ABS  --   --   --  0.57 0.75   EOS ABS  --   --   --  0.22 0.19   MCV  --   --   --  89.2 88.1         Lab 10/08/22  0430 10/07/22  0953 10/06/22  1450   SODIUM 143 140 137   POTASSIUM 4.0 3.6 3.9   CHLORIDE 109* 105 98   CO2 25.0 26.0 25.0   ANION GAP 9.0 9.0 14.0   BUN 65* 82* 93*   CREATININE 0.95 1.20* 1.45*   EGFR 59.6* 45.0* 35.9*   GLUCOSE 57* 77 170*   CALCIUM 8.3* 8.2*  9.1   MAGNESIUM  --   --  1.5*         Lab 10/07/22  0953 10/06/22  1450   TOTAL PROTEIN 4.5* 5.7*   ALBUMIN 2.50* 2.90*   GLOBULIN 2.0 2.8   ALT (SGPT) 6 7   AST (SGOT) 17 17   BILIRUBIN 0.7 0.7   ALK PHOS 145* 157*         Lab 10/06/22  1450   TROPONIN T 0.178*             Lab 10/06/22  1718   ABO TYPING A   RH TYPING Positive   ANTIBODY SCREEN Negative         Brief Urine Lab Results  (Last result in the past 365 days)      Color   Clarity   Blood   Leuk Est   Nitrite   Protein   CREAT   Urine HCG        10/06/22 1602 Yellow   Cloudy   Negative   Large (3+)   Negative   100 mg/dL (2+)                 Microbiology Results Abnormal     Procedure Component Value - Date/Time    Urine Culture - Urine, Urine, Catheter In/Out [[id number]]  (Normal) Collected: 10/06/22 1602    Lab Status: Final result Specimen: Urine, Catheter In/Out Updated: 10/1940     Urine Culture No growth          CT Abdomen Pelvis Without Contrast    Result Date: 10/6/2022  DATE OF EXAM: 10/6/2022 3:02 PM  PROCEDURE: CT ABDOMEN PELVIS WO CONTRAST-  INDICATIONS: abd pain nv  COMPARISON: CT abdomen and pelvis 9/21/2022  TECHNIQUE: Routine transaxial slices were obtained through the abdomen and pelvis without the administration of intravenous contrast. Reconstructed coronal and sagittal images were also obtained. Automated exposure control and iterative construction methods were used.  The radiation dose reduction device was turned on for each scan per the ALARA (As Low as Reasonably Achievable) protocol.  FINDINGS: Lung bases are grossly clear with similar peripheral reticulations/chronic interstitial changes. Unremarkable appearance of the liver. The gallbladder is surgically absent. Normal appearance of the bile ducts. Unremarkable appearance of the spleen and pancreas. Normal appearance of the adrenal glands, kidneys, ureters, and bladder. The uterus is surgically absent. There is some inspissated stool in the rectum. No evidence of bowel  obstruction or definite inflammatory change of the GI tract. No abdominopelvic free fluid or conspicuous fat stranding. No pneumoperitoneum. Scattered air within the subcutaneous tissues of the anterior mid abdomen, nonspecific, possibly reflecting sites of injection. There is atherosclerosis without aneurysm. No lymphadenopathy. No acute osseous findings.      Impression: No significant interval change. No acute abdominopelvic findings.  This report was finalized on 10/6/2022 3:29 PM by Kings Baez MD.      CT Head Without Contrast    Result Date: 10/6/2022  DATE OF EXAM: 10/6/2022 3:02 PM  PROCEDURE: CT HEAD WO CONTRAST-  INDICATIONS: syncope  COMPARISON: 7/26/2022  TECHNIQUE: Routine transaxial and coronal reconstruction images were obtained through the head without the administration of contrast. Automated exposure control and iterative reconstruction methods were used.  The radiation dose reduction device was turned on for each scan per the ALARA (As Low as Reasonably Achievable) protocol.  FINDINGS: Gray-white differentiation is maintained and there is no evidence of intracranial hemorrhage, mass or mass effect. Age-related changes of the brain are present including volume loss and typical periventricular sequela of chronic small vessel ischemia. There is otherwise no evidence of intracranial hemorrhage, mass or mass effect. The ventricles are normal in size and configuration accounting for surrounding volume loss. The orbits are normal and the paranasal sinuses are grossly clear.      Impression: Age-related changes of the brain as above, otherwise without evidence of acute intracranial abnormality.   This report was finalized on 10/6/2022 3:45 PM by Babatunde Dinero.      XR Chest 1 View    Result Date: 10/6/2022  DATE OF EXAM: 10/6/2022 4:02 PM  PROCEDURE: XR CHEST 1 VW-  INDICATIONS: Syncope.  COMPARISON: 08/26/2022.  TECHNIQUE: Single radiographic view of the chest was obtained.  FINDINGS: The heart  size is normal. The pulmonary vascular markings are normal. The lungs and pleural spaces are clear of active disease.  There are chronic age-related changes involving the bony thorax and thoracic aorta.      Impression: No active disease.  This report was finalized on 10/6/2022 4:13 PM by Juliano Burgos MD.        Results for orders placed during the hospital encounter of 08/21/22    Adult Transthoracic Echo Complete W/ Cont if Necessary Per Protocol    Interpretation Summary  · Estimated left ventricular EF = 55%  · Mild to moderate mitral valve regurgitation is present.      I have reviewed the medications:  Scheduled Meds:amoxicillin-clavulanate, 1 tablet, Oral, Q24H  atorvastatin, 40 mg, Oral, Daily  bumetanide, 0.5 mg, Oral, Daily  guaiFENesin, 1,200 mg, Oral, BID  hydrALAZINE, 25 mg, Oral, Q8H  insulin lispro, 0-9 Units, Subcutaneous, TID AC  lactobacillus acidophilus, 1 capsule, Oral, Daily  metoprolol tartrate, 12.5 mg, Oral, Q12H  minocycline, 100 mg, Oral, Q12H  pantoprazole, 40 mg, Intravenous, Q12H  sennosides-docusate, 2 tablet, Oral, BID  sodium chloride, 10 mL, Intravenous, Q12H  vitamin B-12, 100 mcg, Oral, Daily      Continuous Infusions:sodium chloride, 125 mL/hr, Last Rate: 125 mL/hr (10/07/22 2200)      PRN Meds:.•  acetaminophen  •  bisacodyl  •  dextrose  •  dextrose  •  glucagon (human recombinant)  •  HYDROcodone-acetaminophen  •  magnesium sulfate **OR** magnesium sulfate in D5W 1g/100mL (PREMIX) **OR** magnesium sulfate  •  ondansetron  •  sodium chloride  •  sodium chloride    Assessment & Plan   Assessment & Plan     Active Hospital Problems    Diagnosis  POA   • **GIB (gastrointestinal bleeding) [K92.2]  Yes   • Paroxysmal atrial fibrillation (HCC) [I48.0]  No   • Vasovagal syncope [R55]  Yes   • Sleep apnea [G47.30]  Yes   • Anemia, chronic disease [D63.8]  Yes   • Demand ischemia (HCC) [I24.8]  Yes   • Degeneration of lumbar or lumbosacral intervertebral disc [M51.37]  Yes   • Coronary  artery disease involving native coronary artery of native heart without angina pectoris [I25.10]  Yes   • Chronic combined systolic and diastolic heart failure ([facility]) [I50.42]  Yes   • NICM (nonischemic cardiomyopathy) ([facility]) [I42.8]  Yes   • Dyslipidemia [E78.5]  Yes   • Essential hypertension [I10]  Yes   • Diabetes mellitus with neuropathy ([facility]) [E11.40]  Yes   • Diabetic foot ulcer ([facility]) [E11.621, L97.509]  Yes   • Stage 3a chronic kidney disease ([facility]) [N18.31]  Yes   • PVD (peripheral vascular disease) ([facility]) [I73.9]  Yes      Resolved Hospital Problems   No resolved problems to display.        Brief Hospital Course to date:  Susan Anderson is a 82 y.o. female with multiple medical problems that include but not limited to CKD stage III, atrial fibrillation, hypertension, PVD/PAD type 2 diabetes, left lower extremity infection/osteomyelitis s/p mid SFA to LLE with KRISTY, ELIUD who presents to the ED with dizziness, nausea, admitted with suspected GI bleed     Symptomatic acute blood loss anemia  Suspected GI bleed  Near syncope  -CT abd/pelvis is unremarkable, CT is head negative  -Hemoglobin dropped to 6.8, FOB is positive, suggestive of likely GI bleed  -she is s/p 1 unit PRBC 10/7/2022 with good response, repeat H&H is stable  -GI consulted she is s/p EGD with finding of normal esophagus, nonbleeding gastric ulcers, pathology taken, recommend continue PPI and repeat EGD in 3 months     PAD/PVD  Non-healing Left foot ulcer/osteomyelitis  -Followed by Dr. Finney, s/p KRISTY to mid SFA 9/28  -Holding aspirin/Plavix secondary to above, cards rec Plavix and Eliquis  -Continue oral antibiotics, currently on Augmentin/minocycline, followed by ID, Dr. Pete  -WOC, PT/OT following     Relatively well-controlled type 2 diabetes A1c 7.8%  -FSBG's reviewed and are appropriate  -Continue SSI for now adjust as warranted.     Hypertension  -BP is much improved, resume home hydralazine     Paroxysmal atrial fibrillation  -Rate  seems to be controlled, continue metoprolol  -Hold Eliquis due to likely GI bleed, defer resumption to cardiology/GI  -Cardiology following     Ischemic cardiomyopathy, HFpEF  -Seems to be currently compensated  -Continues beta-blocker and statin  -resume bumex     CKD stage III  -Baseline creatinine~1.3-1.4 currently stable  -Patient followed by KAYKAY     Hypomagnesemia  -Continue to monitor and replete per protocol     Elevated troponin  -Chronic in the setting of CKD, severe PVD and ICM  -No further work-up is planned at this time     Expected Discharge Location and Transportation: Home versus rehab  Expected Discharge Date:/10/22     DVT prophylaxis:  Mechanical DVT prophylaxis orders are present.          CODE STATUS:   Code Status and Medical Interventions:   Ordered at: 10/06/22 0574     Medical Intervention Limits:    NO intubation (DNI)    NO cardioversion     Level Of Support Discussed With:    Patient     Code Status (Patient has no pulse and is not breathing):    No CPR (Do Not Attempt to Resuscitate)     Medical Interventions (Patient has pulse or is breathing):    Limited Support       Fortino Constantino MD  10/08/22

## 2022-10-08 NOTE — THERAPY EVALUATION
Patient Name: Susan Anderson  : 1940    MRN: [id number]                              Today's Date: 10/8/2022       Admit Date: 10/6/2022    Visit Dx:     ICD-10-CM ICD-9-CM   1. Syncope, unspecified syncope type  R55 780.2   2. Anemia, unspecified type  D64.9 285.9   3. LACEY (acute kidney injury) ([facility])  N17.9 584.9   4. Elevated troponin  R77.8 790.6   5. Anticoagulated  Z79.01 V58.61   6. Gastrointestinal hemorrhage, unspecified gastrointestinal hemorrhage type  K92.2 578.9     Patient Active Problem List   Diagnosis   • Diabetic foot ulcer ([facility])   • PVD (peripheral vascular disease) ([facility])   • Osteomyelitis ([facility])   • Diabetes mellitus with neuropathy ([facility])   • Essential hypertension   • Stage 3a chronic kidney disease ([facility])   • Pyogenic inflammation of bone ([facility])   • Hyperglycemia   • Pneumonia due to infectious organism   • Mitral valve disease   • NICM (nonischemic cardiomyopathy) ([facility])   • Coronary artery disease involving native coronary artery of native heart without angina pectoris   • Dyslipidemia   • Chronic combined systolic and diastolic heart failure ([facility])   • Lumbar stenosis with neurogenic claudication   • Spondylosis of lumbar region without myelopathy or radiculopathy   • Spondylolisthesis, lumbar region   • Degeneration of lumbar or lumbosacral intervertebral disc   • Gait disturbance   • Physical deconditioning   • Sarcopenia   • Weakness   • Anemia, chronic disease   • Fall   • Dizziness   • Demand ischemia ([facility])   • Effusion of right knee   • Right knee pain   • Pressure injury of skin of sacral region   • Sleep apnea   • GIB (gastrointestinal bleeding)   • Vasovagal syncope   • Hypomagnesemia   • Syncope, unspecified syncope type   • Paroxysmal atrial fibrillation ([facility])     Past Medical History:   Diagnosis Date   • Arthritis    • Asthma  - double pneumonia    Currently on inhaler and nebulizer   • Atrial fibrillation ([facility]) 2022   • Cancer ([facility])     cervical cancer, skin cancer    • CHF (congestive heart failure) (HCC) June 4, 2021   • Chronic kidney disease Related to diabetes   • Coronary artery disease 6/4/2021 DX for hear failure   • Diabetes mellitus (HCC) 30 years    Seeing Dr. Lam 1st time Aug 19   • Gout    • Hx of colonoscopy    • Hyperlipidemia Reference current labs x 2-3yrs approx   • Hypertension 30 years   • Migraine    • Mitral valve disease    • Mitral valve disease    • Osteomyelitis (HCC)    • Peripheral neuropathy    • Sleep apnea    • Type 2 diabetes mellitus (HCC)     30 years     Past Surgical History:   Procedure Laterality Date   • ABDOMINAL HYSTERECTOMY W/SALPINGECTOMY     • AMPUTATION  Right great toe, 1st 1/3 metatarsal -[place] 4/14/21   • AORTAGRAM N/A 4/9/2021    Procedure: AORTAGRAM WITH OR WITHOUT RUNOFFS, WITH Co2;  Surgeon: rTey Forrest MD;  Location:  [facility];  Service: Vascular;  Laterality: N/A;   • AORTAGRAM N/A 9/28/2022    Procedure: CO2 ANGIOGRAM, LEFT SFA ANGIOPLASTY WITH DRUG ELUTING BALLOON, LEFT SFA STENT PLACEMENT ;  Surgeon: Trey Forrest MD;  Location:  [facility];  Service: Vascular;  Laterality: N/A;  FLUORO: 13.12  DOSE 162mGy  CONTRAST: Isovue 350: 15ml   • APPENDECTOMY     • BRAIN TUMOR EXCISION      laser surgery    • CARDIAC CATHETERIZATION N/A 6/21/2021    Procedure: LEFT HEART CATH;  Surgeon: Anjum Ceballos MD;  Location:  [facility] CATH INVASIVE LOCATION;  Service: Cardiology;  Laterality: N/A;   • CATARACT EXTRACTION W/ INTRAOCULAR LENS  IMPLANT, BILATERAL     • CHOLECYSTECTOMY     • EYE SURGERY     • HYSTERECTOMY     • TOE SURGERY     • TRANS METATARSAL AMPUTATION Right 4/14/2021    Procedure: AMPUTATION TRANS METATARSAL RIGHT GREAT TOE;  Surgeon: Valdez Finney MD;  Location:  [facility] OR;  Service: Vascular;  Laterality: Right;      General Information     Row Name 10/08/22 1139          OT Time and Intention    Document Type evaluation  -MA     Mode of Treatment occupational therapy  -MA     Row Name 10/08/22 1139           General Information    Patient Profile Reviewed yes  -MA     Prior Level of Function independent:;bed mobility;transfer;w/c or scooter;mod assist:;ADL's  -MA     Existing Precautions/Restrictions fall  -MA     Barriers to Rehab medically complex;previous functional deficit  -MA     Row Name 10/08/22 1139          Living Environment    People in Home alone;other (see comments)  Pt has caregivers day & night, daughter checks on pt daily  -MA     Row Name 10/08/22 1139          Home Main Entrance    Number of Stairs, Main Entrance other (see comments)  ramp  -MA     Row Name 10/08/22 1139          Stairs Within Home, Primary    Number of Stairs, Within Home, Primary none  -MA     Row Name 10/08/22 1139          Cognition    Orientation Status (Cognition) oriented x 4  -MA     Row Name 10/08/22 1139          Safety Issues, Functional Mobility    Safety Issues Affecting Function (Mobility) safety precaution awareness;safety precautions follow-through/compliance;sequencing abilities;insight into deficits/self-awareness  -MA     Impairments Affecting Function (Mobility) balance;endurance/activity tolerance;strength  -MA           User Key  (r) = Recorded By, (t) = Taken By, (c) = Cosigned By    Initials Name Provider Type    Larissa Fontanez OT Occupational Therapist                 Mobility/ADL's     Row Name 10/08/22 1141          Bed Mobility    Bed Mobility sit-supine;scooting/bridging  -MA     Scooting/Bridging [place] (Bed Mobility) maximum assist (25% patient effort);1 person assist;verbal cues  -MA     Sit-Supine [place] (Bed Mobility) minimum assist (75% patient effort);1 person assist;verbal cues  -MA     Bed Mobility, Safety Issues decreased use of arms for pushing/pulling;decreased use of legs for bridging/pushing  -MA     Assistive Device (Bed Mobility) bed rails;head of bed elevated  -MA     Comment, (Bed Mobility) Pt req min Ax1 for sit-to-supine transfer to bring BLE's onto EOB  -MA      Row Name 10/08/22 1141          Transfers    Transfers sit-stand transfer;stand-sit transfer;bed-chair transfer  -MA     Row Name 10/08/22 1141          Bed-Chair Transfer    Bed-Chair [place] (Transfers) contact guard;verbal cues  chair-to-bed transfer  -MA     Assistive Device (Bed-Chair Transfers) walker, front-wheeled  -MA     Row Name 10/08/22 1141          Sit-Stand Transfer    Sit-Stand [place] (Transfers) contact guard;verbal cues  -MA     Assistive Device (Sit-Stand Transfers) walker, front-wheeled  -MA     Row Name 10/08/22 1141          Stand-Sit Transfer    Stand-Sit [place] (Transfers) contact guard;verbal cues  -MA     Assistive Device (Stand-Sit Transfers) walker, front-wheeled  -MA     Row Name 10/08/22 1141          Activities of Daily Living    BADL Assessment/Intervention lower body dressing  -MA     Row Name 10/08/22 1141          Lower Body Dressing Assessment/Training    [place] Level (Lower Body Dressing) don;socks;dependent (less than 25% patient effort);verbal cues  -MA     Position (Lower Body Dressing) supine  -MA           User Key  (r) = Recorded By, (t) = Taken By, (c) = Cosigned By    Initials Name Provider Type    Larissa Fontanez OT Occupational Therapist               Obj/Interventions     Row Name 10/08/22 1142          Sensory Assessment (Somatosensory)    Sensory Assessment (Somatosensory) UE sensation intact  -MA     Row Name 10/08/22 1142          Vision Assessment/Intervention    Visual Impairment/Limitations WFL  -MA     Row Name 10/08/22 1142          Range of Motion Comprehensive    General Range of Motion bilateral upper extremity ROM WFL  -MA     Row Name 10/08/22 1142          Strength Comprehensive (MMT)    General Manual Muscle Testing (MMT) Assessment upper extremity strength deficits identified  -MA     Comment, General Manual Muscle Testing (MMT) Assessment BUE grossly 4-/5  -MA     Row Name 10/08/22 1142          Balance    Balance Assessment  sitting static balance;sit to stand dynamic balance;standing static balance;standing dynamic balance  -MA     Static Sitting Balance supervision  -MA     Position, Sitting Balance unsupported;sitting in chair;sitting edge of bed  -MA     Sit to Stand Dynamic Balance contact guard;verbal cues  -MA     Static Standing Balance contact guard;verbal cues  -MA     Dynamic Standing Balance contact guard;verbal cues  -MA     Position/Device Used, Standing Balance supported;walker, front-wheeled  -MA     Balance Interventions sitting;sit to stand;occupation based/functional task  -MA           User Key  (r) = Recorded By, (t) = Taken By, (c) = Cosigned By    Initials Name Provider Type    Larissa Fontanez OT Occupational Therapist               Goals/Plan     Row Name 10/08/22 1145          Transfer Goal 1 (OT)    Activity/Assistive Device (Transfer Goal 1, OT) bed-to-chair/chair-to-bed;toilet;commode, bedside without drop arms;walker, rolling  -MA     [place] Level/Cues Needed (Transfer Goal 1, OT) standby assist  -MA     Time Frame (Transfer Goal 1, OT) long term goal (LTG);10 days  -MA     Progress/Outcome (Transfer Goal 1, OT) goal ongoing  -MA     Row Name 10/08/22 1145          Dressing Goal 1 (OT)    Activity/Device (Dressing Goal 1, OT) lower body dressing  don/doff socks/undergarment w/ AAD  -MA     [place]/Cues Needed (Dressing Goal 1, OT) verbal cues required;moderate assist (50-74% patient effort)  -MA     Time Frame (Dressing Goal 1, OT) long term goal (LTG);10 days  -MA     Progress/Outcome (Dressing Goal 1, OT) goal ongoing  -MA     Row Name 10/08/22 1145          Toileting Goal 1 (OT)    Activity/Device (Toileting Goal 1, OT) adjust/manage clothing;perform perineal hygiene;commode;grab bar/safety frame  -MA     [place] Level/Cues Needed (Toileting Goal 1, OT) minimum assist (75% or more patient effort);verbal cues required  -MA     Time Frame (Toileting Goal 1, OT) long term goal (LTG);10  days  -MA     Progress/Outcome (Toileting Goal 1, OT) goal ongoing  -MA     Row Name 10/08/22 1145          Grooming Goal 1 (OT)    Activity/Device (Grooming Goal 1, OT) hair care;oral care;wash face, hands  standing sinkside  -MA     [place] (Grooming Goal 1, OT) contact guard required  -MA     Time Frame (Grooming Goal 1, OT) long term goal (LTG);10 days  -MA     Progress/Outcome (Grooming Goal 1, OT) goal ongoing  -MA     Row Name 10/08/22 1145          Therapy Assessment/Plan (OT)    Planned Therapy Interventions (OT) activity tolerance training;adaptive equipment training;BADL retraining;functional balance retraining;IADL retraining;occupation/activity based interventions;ROM/therapeutic exercise;strengthening exercise;transfer/mobility retraining;patient/caregiver education/training  -MA           User Key  (r) = Recorded By, (t) = Taken By, (c) = Cosigned By    Initials Name Provider Type    Larissa Fontanez, MUSTAPHA Occupational Therapist               Clinical Impression     Row Name 10/08/22 1142          Pain Assessment    Pretreatment Pain Rating 0/10 - no pain  -MA     Posttreatment Pain Rating 0/10 - no pain  -MA     Row Name 10/08/22 1142          Plan of Care Review    Plan of Care Reviewed With patient  -MA     Outcome Evaluation OT eval complete. Pt presents w/ decreased activity tolerance, generalized weakness, and mild balance deficits limiting her ADL independence. Pt CGA for STS & SPT from chair-to-bed w/ RW, min Ax1 for sit-to-supine transfer, dep for LB ADLs. Pt would benefit from continued skilled IPOT services to address current functional deficits. Rec home w/ A, caregivers, and OP PT at d/c.  -MA     Row Name 10/08/22 1142          Therapy Assessment/Plan (OT)    Patient/Family Therapy Goal Statement (OT) Pt reports her goal is to walk more.  -MA     Rehab Potential (OT) good, to achieve stated therapy goals  -MA     Criteria for Skilled Therapeutic Interventions Met (OT) yes;skilled  treatment is necessary  -MA     Therapy Frequency (OT) daily  -MA     Row Name 10/08/22 1142          Therapy Plan Review/Discharge Plan (OT)    Anticipated Discharge Disposition (OT) home with assist;home with outpatient therapy services  -MA     Row Name 10/08/22 1142          Vital Signs    Pre Systolic BP Rehab --  VSS - RN cleared OT  -MA     O2 Delivery Pre Treatment room air  -MA     O2 Delivery Intra Treatment room air  -MA     O2 Delivery Post Treatment room air  -MA     Pre Patient Position Sitting  -MA     Intra Patient Position Standing  -MA     Post Patient Position Supine  -MA     Row Name 10/08/22 1142          Positioning and Restraints    Pre-Treatment Position sitting in chair/recliner  -MA     Post Treatment Position bed  -MA     In Bed supine;call light within reach;encouraged to call for assist;exit alarm on;side rails up x3;heels elevated  -MA           User Key  (r) = Recorded By, (t) = Taken By, (c) = Cosigned By    Initials Name Provider Type    Larissa Fontanez, MUSTAPHA Occupational Therapist               Outcome Measures     Row Name 10/08/22 1146          How much help from another is currently needed...    Putting on and taking off regular lower body clothing? 2  -MA     Bathing (including washing, rinsing, and drying) 2  -MA     Toileting (which includes using toilet bed pan or urinal) 2  -MA     Putting on and taking off regular upper body clothing 3  -MA     Taking care of personal grooming (such as brushing teeth) 3  -MA     Eating meals 3  -MA     AM-PAC 6 Clicks Score (OT) 15  -MA     Row Name 10/08/22 1010          How much help from another person do you currently need...    Turning from your back to your side while in flat bed without using bedrails? 3  -ML     Moving from lying on back to sitting on the side of a flat bed without bedrails? 3  -ML     Moving to and from a bed to a chair (including a wheelchair)? 3  -ML     Standing up from a chair using your arms (e.g.,  wheelchair, bedside chair)? 3  -ML     Climbing 3-5 steps with a railing? 2  -ML     To walk in hospital room? 2  -ML     AM-PAC 6 Clicks Score (PT) 16  -ML     Highest level of mobility 5 --> Static standing  -ML     Row Name 10/08/22 1146 10/08/22 1010       Functional Assessment    Outcome Measure Options AM-PAC 6 Clicks Daily Activity (OT)  -MA AM-PAC 6 Clicks Basic Mobility (PT)  -ML          User Key  (r) = Recorded By, (t) = Taken By, (c) = Cosigned By    Initials Name Provider Type    Larissa Fontanez, MUSTAPHA Occupational Therapist    Janeth Dumont Physical Therapist                Occupational Therapy Education     Title: PT OT SLP Therapies (In Progress)     Topic: Occupational Therapy (In Progress)     Point: ADL training (Done)     Description:   Instruct learner(s) on proper safety adaptation and remediation techniques during self care or transfers.   Instruct in proper use of assistive devices.              Learning Progress Summary           Patient Acceptance, E, VU by MA at 10/8/2022 1146                   Point: Home exercise program (Not Started)     Description:   Instruct learner(s) on appropriate technique for monitoring, assisting and/or progressing therapeutic exercises/activities.              Learner Progress:  Not documented in this visit.          Point: Precautions (Done)     Description:   Instruct learner(s) on prescribed precautions during self-care and functional transfers.              Learning Progress Summary           Patient Acceptance, E, VU by MA at 10/8/2022 1146                   Point: Body mechanics (Done)     Description:   Instruct learner(s) on proper positioning and spine alignment during self-care, functional mobility activities and/or exercises.              Learning Progress Summary           Patient Acceptance, E, VU by MA at 10/8/2022 1146                               User Key     Initials Effective Dates Name Provider Type Chato BLOUNT 11/19/20 -  Nancie  MUSTAPHA Dias Occupational Therapist OT              OT Recommendation and Plan  Planned Therapy Interventions (OT): activity tolerance training, adaptive equipment training, BADL retraining, functional balance retraining, IADL retraining, occupation/activity based interventions, ROM/therapeutic exercise, strengthening exercise, transfer/mobility retraining, patient/caregiver education/training  Therapy Frequency (OT): daily  Plan of Care Review  Plan of Care Reviewed With: patient  Outcome Evaluation: OT eval complete. Pt presents w/ decreased activity tolerance, generalized weakness, and mild balance deficits limiting her ADL independence. Pt CGA for STS & SPT from chair-to-bed w/ RW, min Ax1 for sit-to-supine transfer, dep for LB ADLs. Pt would benefit from continued skilled IPOT services to address current functional deficits. Rec home w/ A, caregivers, and OP PT at d/c.     Time Calculation:    Time Calculation- OT     Row Name 10/08/22 1147             Time Calculation- OT    OT Start Time 1110  -MA      OT Received On 10/08/22  -MA      OT Goal Re-Cert Due Date 10/18/22  -MA         Timed Charges    05419 - OT Therapeutic Activity Minutes 5  -MA      58911 - OT Self Care/Mgmt Minutes 3  -MA         Untimed Charges    OT Eval/Re-eval Minutes 34  -MA         Total Minutes    Timed Charges Total Minutes 8  -MA      Untimed Charges Total Minutes 34  -MA       Total Minutes 42  -MA            User Key  (r) = Recorded By, (t) = Taken By, (c) = Cosigned By    Initials Name Provider Type    Larissa Fontanez OT Occupational Therapist              Therapy Charges for Today     Code Description Service Date Service Provider Modifiers Qty    51338472235  OT THERAPEUTIC ACT EA 15 MIN 10/8/2022 Larissa Canada OT GO 1    [id number] HC OT EVAL MOD COMPLEXITY 3 10/8/2022 Larissa Canada OT GO 1               Larissa Canada OT  10/8/2022

## 2022-10-08 NOTE — PLAN OF CARE
Goal Outcome Evaluation:  Plan of Care Reviewed With: patient           Outcome Evaluation: PT presents with 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 PT was able to lightly debride a small amount of slough from wound base to help encourage better granulation and wound healing.

## 2022-10-08 NOTE — THERAPY RE-EVALUATION
Acute Care - Wound/Debridement Initial Evaluation  [facility]     Patient Name: Susan Anderosn  : 1940  MRN: [id number]  Today's Date: 10/8/2022                Admit Date: 10/6/2022    Visit Dx:    ICD-10-CM ICD-9-CM   1. Syncope, unspecified syncope type  R55 780.2   2. Anemia, unspecified type  D64.9 285.9   3. LACEY (acute kidney injury) ([facility])  N17.9 584.9   4. Elevated troponin  R77.8 790.6   5. Anticoagulated  Z79.01 V58.61   6. Gastrointestinal hemorrhage, unspecified gastrointestinal hemorrhage type  K92.2 578.9       Patient Active Problem List   Diagnosis   • Diabetic foot ulcer ([facility])   • PVD (peripheral vascular disease) ([facility])   • Osteomyelitis ([facility])   • Diabetes mellitus with neuropathy ([facility])   • Essential hypertension   • Stage 3a chronic kidney disease ([facility])   • Pyogenic inflammation of bone ([facility])   • Hyperglycemia   • Pneumonia due to infectious organism   • Mitral valve disease   • NICM (nonischemic cardiomyopathy) ([facility])   • Coronary artery disease involving native coronary artery of native heart without angina pectoris   • Dyslipidemia   • Chronic combined systolic and diastolic heart failure ([facility])   • Lumbar stenosis with neurogenic claudication   • Spondylosis of lumbar region without myelopathy or radiculopathy   • Spondylolisthesis, lumbar region   • Degeneration of lumbar or lumbosacral intervertebral disc   • Gait disturbance   • Physical deconditioning   • Sarcopenia   • Weakness   • Anemia, chronic disease   • Fall   • Dizziness   • Demand ischemia ([facility])   • Effusion of right knee   • Right knee pain   • Pressure injury of skin of sacral region   • Sleep apnea   • GIB (gastrointestinal bleeding)   • Vasovagal syncope   • Hypomagnesemia   • Syncope, unspecified syncope type   • Paroxysmal atrial fibrillation ([facility])        Past Medical History:   Diagnosis Date   • Arthritis    • Asthma  - double pneumonia    Currently on inhaler and nebulizer   • Atrial fibrillation ([facility]) 2022    • Cancer (HCC)     cervical cancer, skin cancer   • CHF (congestive heart failure) (HCC) June 4, 2021   • Chronic kidney disease Related to diabetes   • Coronary artery disease 6/4/2021 DX for hear failure   • Diabetes mellitus (HCC) 30 years    Seeing Dr. Lam 1st time Aug 19   • Gout    • Hx of colonoscopy    • Hyperlipidemia Reference current labs x 2-3yrs approx   • Hypertension 30 years   • Migraine    • Mitral valve disease    • Mitral valve disease    • Osteomyelitis (HCC)    • Peripheral neuropathy    • Sleep apnea    • Type 2 diabetes mellitus (HCC)     30 years        Past Surgical History:   Procedure Laterality Date   • ABDOMINAL HYSTERECTOMY W/SALPINGECTOMY     • AMPUTATION  Right great toe, 1st 1/3 metatarsal -Reg 4/14/21   • AORTAGRAM N/A 4/9/2021    Procedure: AORTAGRAM WITH OR WITHOUT RUNOFFS, WITH Co2;  Surgeon: Trey Forrest MD;  Location:  [facility];  Service: Vascular;  Laterality: N/A;   • AORTAGRAM N/A 9/28/2022    Procedure: CO2 ANGIOGRAM, LEFT SFA ANGIOPLASTY WITH DRUG ELUTING BALLOON, LEFT SFA STENT PLACEMENT ;  Surgeon: Trey Forrest MD;  Location:  [facility];  Service: Vascular;  Laterality: N/A;  FLUORO: 13.12  DOSE 162mGy  CONTRAST: Isovue 350: 15ml   • APPENDECTOMY     • BRAIN TUMOR EXCISION      laser surgery    • CARDIAC CATHETERIZATION N/A 6/21/2021    Procedure: LEFT HEART CATH;  Surgeon: Anjum Ceballos MD;  Location:  [facility] CATH INVASIVE LOCATION;  Service: Cardiology;  Laterality: N/A;   • CATARACT EXTRACTION W/ INTRAOCULAR LENS  IMPLANT, BILATERAL     • CHOLECYSTECTOMY     • EYE SURGERY     • HYSTERECTOMY     • TOE SURGERY     • TRANS METATARSAL AMPUTATION Right 4/14/2021    Procedure: AMPUTATION TRANS METATARSAL RIGHT GREAT TOE;  Surgeon: Valdez Finney MD;  Location:  [facility] OR;  Service: Vascular;  Laterality: Right;           Wound 09/21/22 1850 Bilateral medial coccyx Pressure Injury (Active)   Pressure Injury Stage 1 10/07/22 2000   Dressing Appearance  open to air 10/08/22 1400   Closure Open to air 10/08/22 0800   Base blanchable;dry;pink 10/08/22 1400   Periwound intact;blanchable;dry 10/08/22 1400   Periwound Temperature warm 10/08/22 1400   Periwound Skin Turgor soft 10/08/22 1400   Care, Wound cleansed with;wound cleanser;barrier applied 10/08/22 1200   Dressing Care open to air 10/07/22 2000   Periwound Care dry periwound area maintained 10/08/22 1400       Wound 10/07/22 1105 Right anterior foot Pressure Injury (Active)   Dressing Appearance dry;intact 10/08/22 0800   Closure Adhesive bandage 10/08/22 0800   Base dressing in place, unable to visualize 10/08/22 1400   Periwound Temperature warm 10/07/22 2000   Periwound Skin Turgor soft 10/07/22 2000   Periwound Care dry periwound area maintained 10/08/22 1200       Wound 10/08/22 1400 Left anterior foot Arterial Ulcer (Active)   Dressing Appearance intact;moist drainage 10/08/22 1400   Base moist;pink;red;slough;yellow 10/08/22 1400   Periwound intact;dry 10/08/22 1400   Periwound Temperature warm 10/08/22 1400   Periwound Skin Turgor soft 10/08/22 1400   Edges irregular 10/08/22 1400   Wound Length (cm) 2.4 cm 10/08/22 1400   Wound Width (cm) 2.4 cm 10/08/22 1400   Wound Depth (cm) 0.2 cm 10/08/22 1400   Wound Surface Area (cm^2) 5.76 cm^2 10/08/22 1400   Wound Volume (cm^3) 1.152 cm^3 10/08/22 1400   Drainage Characteristics/Odor serosanguineous 10/08/22 1400   Drainage Amount small 10/08/22 1400   Care, Wound irrigated with;sterile normal saline;debrided 10/08/22 1400   Dressing Care foam;low-adherent 10/08/22 1400   Periwound Care cleansed with pH balanced cleanser 10/08/22 1400         WOUND DEBRIDEMENT  Total area of Debridement: ~1cm2  Debridement Site 1  Location- Site 1: L ant foot wound  Selective Debridement- Site 1: Wound Surface <20cmsq  Instruments- Site 1: tweezers  Excised Tissue Description- Site 1: minimum, slough  Bleeding- Site 1: none               PT Assessment (last 12 hours)     PT  Evaluation and Treatment     Row Name 10/08/22 1400          Physical Therapy Time and Intention    Subjective Information no complaints  -     Document Type evaluation;therapy note (daily note);wound care  -     Mode of Treatment individual therapy;physical therapy  -     Row Name 10/08/22 1400          General Information    Patient Profile Reviewed yes  -MF     Patient Observations alert;cooperative;agree to therapy  -     Pertinent History of Current Functional Problem L foot ulceration  -     Risks Reviewed patient:;increased discomfort  -     Benefits Reviewed patient:;improve skin integrity  -     Barriers to Rehab medically complex;previous functional deficit;physical barrier  -     Row Name 10/08/22 1400          Pain    Pretreatment Pain Rating 0/10 - no pain  -     Posttreatment Pain Rating 0/10 - no pain  -     Row Name 10/08/22 1400          Cognition    Affect/Mental Status (Cognition) WFL;low arousal/lethargic  -     Row Name             [REMOVED] Wound 09/28/22 Left anterior greater trochanter Puncture    Wound - Properties Group Placement Date: 09/28/22  -KJ Present on Hospital Admission: N  -KJ Side: Left  -KJ Orientation: anterior  -KJ Location: greater trochanter  -KJ Primary Wound Type: Puncture  -KJ Removal Date: 10/08/22  -CB Removal Time: 1013  -CB Wound Outcome: Healed  -CB    Retired Wound - Properties Group Placement Date: 09/28/22  -KJ Present on Hospital Admission: N  -KJ Side: Left  -KJ Orientation: anterior  -KJ Location: greater trochanter  -KJ Primary Wound Type: Puncture  -KJ Removal Date: 10/08/22  -CB Removal Time: 1013  -CB Wound Outcome: Healed  -CB    Retired Wound - Properties Group Date first assessed: 09/28/22  -KJ Present on Hospital Admission: N  -KJ Side: Left  -KJ Location: greater trochanter  -KJ Primary Wound Type: Puncture  -KJ Resolution Date: 10/08/22  -CB Resolution Time: 1013  -CB Wound Outcome: Healed  -CB    Row Name             [REMOVED]  Wound 09/22/22 1121 Left lower leg Arterial Ulcer    Wound - Properties Group Placement Date: 09/22/22  -MFA Placement Time: 1121  -MFA Present on Hospital Admission: Y  -MFA Side: Left  -MFA Orientation: lower  -MFA Location: leg  -MFA Primary Wound Type: Arterial ulc  -MFA Removal Date: 10/08/22  -CB Removal Time: 1012  -CB Wound Outcome: Converged  -CB    Retired Wound - Properties Group Placement Date: 09/22/22  -MFA Placement Time: 1121  -MFA Present on Hospital Admission: Y  -MFA Side: Left  -MFA Orientation: lower  -MFA Location: leg  -MFA Primary Wound Type: Arterial ulc  -MFA Removal Date: 10/08/22  -CB Removal Time: 1012  -CB Wound Outcome: Converged  -CB    Retired Wound - Properties Group Date first assessed: 09/22/22  -MFA Time first assessed: 1121  -MFA Present on Hospital Admission: Y  -MFA Side: Left  -MFA Location: leg  -MFA Primary Wound Type: Arterial ulc  -MFA Resolution Date: 10/08/22  -CB Resolution Time: 1012  -CB Wound Outcome: Converged  -CB    Row Name             [REMOVED] Wound 09/21/22 1850 Left anterior foot Arterial Ulcer    Wound - Properties Group Placement Date: 09/21/22  -LH Placement Time: 1850  -LH Present on Hospital Admission: Y  -LH Side: Left  -LH Orientation: anterior  -LH Location: foot  -LH Primary Wound Type: Arterial ulc  -MFA Removal Date: 10/08/22  -CB Removal Time: 1012  -CB Wound Outcome: Converged  -CB    Retired Wound - Properties Group Placement Date: 09/21/22  -LH Placement Time: 1850  -LH Present on Hospital Admission: Y  -LH Side: Left  -LH Orientation: anterior  -LH Location: foot  -LH Primary Wound Type: Arterial ulc  -MFA Removal Date: 10/08/22  -CB Removal Time: 1012  -CB Wound Outcome: Converged  -CB    Retired Wound - Properties Group Date first assessed: 09/21/22  -LH Time first assessed: 1850  -LH Present on Hospital Admission: Y  -LH Side: Left  -LH Location: foot  -LH Primary Wound Type: Arterial ulc  -MFA Resolution Date: 10/08/22  -CB Resolution  Time: 1012  -CB Wound Outcome: Converged  -CB    Row Name             Wound 09/21/22 1850 Bilateral medial coccyx Pressure Injury    Wound - Properties Group Placement Date: 09/21/22  - Placement Time: 1850 -LH Present on Hospital Admission: Y  -LH Side: Bilateral  -LH Orientation: medial  -LH Location: coccyx  -LH Primary Wound Type: Pressure inj  -MFA, Stage 1 PI vs MASD/IAD     Retired Wound - Properties Group Placement Date: 09/21/22  - Placement Time: 1850 -LH Present on Hospital Admission: Y  -LH Side: Bilateral  -LH Orientation: medial  -LH Location: coccyx  -LH Primary Wound Type: Pressure inj  -MFA, Stage 1 PI vs MASD/IAD     Retired Wound - Properties Group Date first assessed: 09/21/22  - Time first assessed: 1850 -LH Present on Hospital Admission: Y  -LH Side: Bilateral  -LH Location: coccyx  -LH Primary Wound Type: Pressure inj  -MFA, Stage 1 PI vs MASD/IAD     Row Name             Wound 10/07/22 1105 Right anterior foot Pressure Injury    Wound - Properties Group Placement Date: 10/07/22  - Placement Time: 1105  -MC Side: Right  -MC Orientation: anterior  -MC Location: foot  -MC Primary Wound Type: Pressure inj  -MC    Retired Wound - Properties Group Placement Date: 10/07/22  - Placement Time: 1105  -MC Side: Right  -MC Orientation: anterior  -MC Location: foot  -MC Primary Wound Type: Pressure inj  -MC    Retired Wound - Properties Group Date first assessed: 10/07/22  - Time first assessed: 1105  -MC Side: Right  -MC Location: foot  -MC Primary Wound Type: Pressure inj  -MC    Row Name 10/08/22 1400          Wound 10/08/22 1400 Left anterior foot Arterial Ulcer    Wound - Properties Group Placement Date: 10/08/22  -MF Placement Time: 1400  -MF Side: Left  -MF Orientation: anterior  -MF Location: foot  -MF Primary Wound Type: Arterial ulc  -MF    Dressing Appearance intact;moist drainage  -MF     Base moist;pink;red;slough;yellow  -MF     Periwound intact;dry  -MF     Periwound  Temperature warm  -MF     Periwound Skin Turgor soft  -MF     Edges irregular  -MF     Wound Length (cm) 2.4 cm  -MF     Wound Width (cm) 2.4 cm  -MF     Wound Depth (cm) 0.2 cm  -MF     Wound Surface Area (cm^2) 5.76 cm^2  -MF     Wound Volume (cm^3) 1.152 cm^3  -MF     Drainage Characteristics/Odor serosanguineous  -MF     Drainage Amount small  -MF     Care, Wound irrigated with;sterile normal saline;debrided  -MF     Dressing Care foam;low-adherent  HFBc to cover ulceration with optifoam to cover  -MF     Periwound Care cleansed with pH balanced cleanser  -     Retired Wound - Properties Group Placement Date: 10/08/22  - Placement Time: 1400  -MF Side: Left  -MF Orientation: anterior  -MF Location: foot  -MF Primary Wound Type: Arterial ulc  -MF    Retired Wound - Properties Group Date first assessed: 10/08/22  - Time first assessed: 1400  -MF Side: Left  -MF Location: foot  -MF Primary Wound Type: Arterial ulc  -MF    Row Name 10/08/22 1400          Coping    Observed Emotional State calm;cooperative  -     Verbalized Emotional State acceptance  -     Trust Relationship/Rapport care explained  -     Row Name 10/08/22 1400          Plan of Care Review    Plan of Care Reviewed With patient  -     Outcome Evaluation PT presents with 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 PT was able to lightly debride a small amount of slough from wound base to help encourage better granulation and wound healing.  -     Row Name 10/08/22 1400          Positioning and Restraints    Pre-Treatment Position in bed  -     Post Treatment Position bed  -     In Bed supine;call light within reach  -     Row Name 10/08/22 1400          Therapy Assessment/Plan (PT)    Patient/Family Therapy Goals Statement (PT) wound healing  -MF     PT Diagnosis (PT) L ant foot  -MF     Rehab Potential (PT) fair, will monitor progress closely  -MF     Criteria for Skilled Interventions Met (PT) yes;meets criteria;skilled treatment is  necessary  -     Row Name 10/08/22 1400          Therapy Plan Review/Discharge Plan (PT)    Therapy Plan Review (PT) evaluation/treatment results reviewed;risks/benefits reviewed;care plan/treatment goals reviewed;current/potential barriers reviewed;participants voiced agreement with care plan;participants included;patient  -     Row Name 10/08/22 1400          Physical Therapy Goals    Wound Care Goal Selection (PT) wound care, PT goal 1  -     Row Name 10/08/22 1400          Wound Care Goal 1 (PT)    Wound Care Goal 1 (PT) Decrease wound size by 10% as evidence of wound healing.  -     Time Frame (Wound Care Goal 1, PT) 10 days  -           User Key  (r) = Recorded By, (t) = Taken By, (c) = Cosigned By    Initials Name Provider Type     Avtar Ayala, PT Physical Therapist    Karina Trevino, RN Registered Nurse    Ting Alonso RN Registered Nurse    Lola Friedman RN Registered Nurse    Shannon Barlow RN Registered Nurse    Funmilayo Taylor RN Registered Nurse              Physical Therapy Education     Title: PT OT SLP Therapies (In Progress)     Topic: Physical Therapy (Done)     Point: Mobility training (Done)     Learning Progress Summary           Patient Acceptance, E, VU by  at 10/8/2022 1010                   Point: Home exercise program (Done)     Learning Progress Summary           Patient Acceptance, E, VU by  at 10/8/2022 1010                   Point: Body mechanics (Done)     Learning Progress Summary           Patient Acceptance, E, VU by  at 10/8/2022 1010                   Point: Precautions (Done)     Learning Progress Summary           Patient Acceptance, E, VU by  at 10/8/2022 1010                               User Key     Initials Effective Dates Name Provider Type [facility] 04/22/21 -  Janeth Cervantes Physical Therapist PT                Recommendation and Plan  Anticipated Equipment Needs at Discharge (PT): other (see comments) (patient owns  all needed DME)  Anticipated Discharge Disposition (PT): home with 24/7 care, home with outpatient therapy services  Planned Therapy Interventions (PT): wound care  Therapy Frequency (PT): daily  Plan of Care Reviewed With: patient           Outcome Evaluation: PT presents with 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 PT was able to lightly debride a small amount of slough from wound base to help encourage better granulation and wound healing.  Plan of Care Reviewed With: patient            Time Calculation   PT Charges     Row Name 10/08/22 1400 10/08/22 1011          Time Calculation    Start Time 1400  -MF 0920  -ML     PT Received On -- 10/08/22  -ML     PT Goal Re-Cert Due Date 10/18/22  -MF 10/18/22  -ML        Timed Charges    46659 - PT Therapeutic Activity Minutes -- 15  -ML        Untimed Charges    PT Eval/Re-eval Minutes 25  -MF 46  -ML     Wound Care 65507 Selective debridement  -MF --     60565-Ugufujins debridement 25  -MF --        Total Minutes    Timed Charges Total Minutes -- 15  -ML     Untimed Charges Total Minutes 50  -MF 46  -ML      Total Minutes 50  -MF 61  -ML           User Key  (r) = Recorded By, (t) = Taken By, (c) = Cosigned By    Initials Name Provider Type     Avtar Ayala, PT Physical Therapist     Janeth Cervantes Physical Therapist                  Therapy Charges for Today     Code Description Service Date Service Provider Modifiers Qty    06653325222 HC PIPE DEBRIDE OPEN WOUND UP TO 20CM 10/8/2022 Avtar Ayala, PT GP 1            PT G-Codes  Outcome Measure Options: AM-PAC 6 Clicks Daily Activity (OT)  AM-PAC 6 Clicks Score (PT): 16  AM-PAC 6 Clicks Score (OT): 15       Avtar Ayala, PT  10/8/2022

## 2022-10-09 LAB
BH BB BLOOD EXPIRATION DATE: NORMAL
BH BB BLOOD TYPE BARCODE: 6200
BH BB DISPENSE STATUS: NORMAL
BH BB PRODUCT CODE: NORMAL
BH BB UNIT NUMBER: NORMAL
CROSSMATCH INTERPRETATION: NORMAL
DEPRECATED RDW RBC AUTO: 55.4 FL (ref 37–54)
ERYTHROCYTE [DISTWIDTH] IN BLOOD BY AUTOMATED COUNT: 17.3 % (ref 12.3–15.4)
GLUCOSE BLDC GLUCOMTR-MCNC: 163 MG/DL (ref 70–130)
GLUCOSE BLDC GLUCOMTR-MCNC: 172 MG/DL (ref 70–130)
GLUCOSE BLDC GLUCOMTR-MCNC: 214 MG/DL (ref 70–130)
HCT VFR BLD AUTO: 26.2 % (ref 34–46.6)
HCT VFR BLD AUTO: 26.3 % (ref 34–46.6)
HCT VFR BLD AUTO: 26.3 % (ref 34–46.6)
HCT VFR BLD AUTO: 26.7 % (ref 34–46.6)
HCT VFR BLD AUTO: 28.7 % (ref 34–46.6)
HCT VFR BLD AUTO: 29 % (ref 34–46.6)
HGB BLD-MCNC: 8.6 G/DL (ref 12–15.9)
HGB BLD-MCNC: 9 G/DL (ref 12–15.9)
HGB BLD-MCNC: 9.4 G/DL (ref 12–15.9)
HGB BLD-MCNC: 9.5 G/DL (ref 12–15.9)
IRON 24H UR-MRATE: 37 MCG/DL (ref 37–145)
IRON SATN MFR SERPL: 20 % (ref 20–50)
MCH RBC QN AUTO: 30.8 PG (ref 26.6–33)
MCHC RBC AUTO-ENTMCNC: 34.2 G/DL (ref 31.5–35.7)
MCV RBC AUTO: 90.1 FL (ref 79–97)
PLATELET # BLD AUTO: 242 10*3/MM3 (ref 140–450)
PMV BLD AUTO: 10.7 FL (ref 6–12)
RBC # BLD AUTO: 2.92 10*6/MM3 (ref 3.77–5.28)
TIBC SERPL-MCNC: 189 MCG/DL (ref 298–536)
TRANSFERRIN SERPL-MCNC: 127 MG/DL (ref 200–360)
UNIT  ABO: NORMAL
UNIT  RH: NORMAL
WBC NRBC COR # BLD: 6.56 10*3/MM3 (ref 3.4–10.8)

## 2022-10-09 PROCEDURE — A9270 NON-COVERED ITEM OR SERVICE: HCPCS | Performed by: NURSE PRACTITIONER

## 2022-10-09 PROCEDURE — 63710000001 AMOXICILLIN-CLAVULANATE 875-125 MG TABLET: Performed by: NURSE PRACTITIONER

## 2022-10-09 PROCEDURE — 85014 HEMATOCRIT: CPT | Performed by: INTERNAL MEDICINE

## 2022-10-09 PROCEDURE — G0378 HOSPITAL OBSERVATION PER HR: HCPCS

## 2022-10-09 PROCEDURE — 63710000001 HYDRALAZINE 25 MG TABLET: Performed by: INTERNAL MEDICINE

## 2022-10-09 PROCEDURE — 63710000001 GUAIFENESIN 600 MG TABLET SUSTAINED-RELEASE 12 HOUR: Performed by: NURSE PRACTITIONER

## 2022-10-09 PROCEDURE — 63710000001 SENNOSIDES-DOCUSATE 8.6-50 MG TABLET: Performed by: NURSE PRACTITIONER

## 2022-10-09 PROCEDURE — 99232 SBSQ HOSP IP/OBS MODERATE 35: CPT | Performed by: INTERNAL MEDICINE

## 2022-10-09 PROCEDURE — 82962 GLUCOSE BLOOD TEST: CPT

## 2022-10-09 PROCEDURE — 84466 ASSAY OF TRANSFERRIN: CPT | Performed by: INTERNAL MEDICINE

## 2022-10-09 PROCEDURE — 63710000001 BUMETANIDE 1 MG TABLET: Performed by: INTERNAL MEDICINE

## 2022-10-09 PROCEDURE — A9270 NON-COVERED ITEM OR SERVICE: HCPCS | Performed by: INTERNAL MEDICINE

## 2022-10-09 PROCEDURE — 63710000001 INSULIN LISPRO (HUMAN) PER 5 UNITS: Performed by: NURSE PRACTITIONER

## 2022-10-09 PROCEDURE — 99213 OFFICE O/P EST LOW 20 MIN: CPT | Performed by: INTERNAL MEDICINE

## 2022-10-09 PROCEDURE — 63710000001 METOPROLOL TARTRATE 12.5 MG TABLET: Performed by: NURSE PRACTITIONER

## 2022-10-09 PROCEDURE — 63710000001 POLYETHYLENE GLYCOL 17 G PACK: Performed by: NURSE PRACTITIONER

## 2022-10-09 PROCEDURE — 63710000001 MAGNESIUM HYDROXIDE 2400 MG/10ML SUSPENSION: Performed by: NURSE PRACTITIONER

## 2022-10-09 PROCEDURE — 83540 ASSAY OF IRON: CPT | Performed by: INTERNAL MEDICINE

## 2022-10-09 PROCEDURE — 85018 HEMOGLOBIN: CPT | Performed by: INTERNAL MEDICINE

## 2022-10-09 PROCEDURE — 99232 SBSQ HOSP IP/OBS MODERATE 35: CPT | Performed by: NURSE PRACTITIONER

## 2022-10-09 PROCEDURE — 63710000001 ACETAMINOPHEN 325 MG TABLET: Performed by: NURSE PRACTITIONER

## 2022-10-09 PROCEDURE — 85027 COMPLETE CBC AUTOMATED: CPT | Performed by: INTERNAL MEDICINE

## 2022-10-09 PROCEDURE — 25010000002 ONDANSETRON PER 1 MG: Performed by: NURSE PRACTITIONER

## 2022-10-09 RX ORDER — POLYETHYLENE GLYCOL 3350 17 G/17G
17 POWDER, FOR SOLUTION ORAL DAILY
Status: DISCONTINUED | OUTPATIENT
Start: 2022-10-09 | End: 2022-10-12 | Stop reason: HOSPADM

## 2022-10-09 RX ADMIN — GUAIFENESIN 1200 MG: 600 TABLET, EXTENDED RELEASE ORAL at 20:58

## 2022-10-09 RX ADMIN — POLYETHYLENE GLYCOL 3350 17 G: 17 POWDER, FOR SOLUTION ORAL at 18:58

## 2022-10-09 RX ADMIN — ACETAMINOPHEN 650 MG: 325 TABLET ORAL at 21:01

## 2022-10-09 RX ADMIN — Medication 10 ML: at 20:58

## 2022-10-09 RX ADMIN — ONDANSETRON 4 MG: 2 INJECTION INTRAMUSCULAR; INTRAVENOUS at 21:06

## 2022-10-09 RX ADMIN — Medication 12.5 MG: at 20:58

## 2022-10-09 RX ADMIN — INSULIN LISPRO 2 UNITS: 100 INJECTION, SOLUTION INTRAVENOUS; SUBCUTANEOUS at 18:58

## 2022-10-09 RX ADMIN — MAGNESIUM HYDROXIDE 10 ML: 2400 SUSPENSION ORAL at 18:58

## 2022-10-09 RX ADMIN — SENNOSIDES AND DOCUSATE SODIUM 2 TABLET: 50; 8.6 TABLET ORAL at 20:57

## 2022-10-09 RX ADMIN — HYDRALAZINE HYDROCHLORIDE 25 MG: 25 TABLET ORAL at 14:10

## 2022-10-09 RX ADMIN — BUMETANIDE 0.5 MG: 1 TABLET ORAL at 09:33

## 2022-10-09 RX ADMIN — INSULIN LISPRO 4 UNITS: 100 INJECTION, SOLUTION INTRAVENOUS; SUBCUTANEOUS at 09:32

## 2022-10-09 RX ADMIN — HYDRALAZINE HYDROCHLORIDE 25 MG: 25 TABLET ORAL at 06:28

## 2022-10-09 RX ADMIN — PANTOPRAZOLE SODIUM 40 MG: 40 INJECTION, POWDER, FOR SOLUTION INTRAVENOUS at 20:58

## 2022-10-09 RX ADMIN — AMOXICILLIN AND CLAVULANATE POTASSIUM 1 TABLET: 875; 125 TABLET, FILM COATED ORAL at 21:01

## 2022-10-09 RX ADMIN — HYDRALAZINE HYDROCHLORIDE 25 MG: 25 TABLET ORAL at 21:01

## 2022-10-09 NOTE — PROGRESS NOTES
"GI Daily Progress Note  Subjective:    Chief Complaint: Follow-up dark stools and weakness    Resting in bed no acute distress.  Is frustrated that she is still in the hospital being poked and prodded on.  Hemoglobin stable.  Has not had a BM.  Appetite remains poor, though improving.  Otherwise No new or worsening symptoms.  Denies pain.    Objective:    /70 (BP Location: Left arm, Patient Position: Lying)   Pulse 66   Temp 97.1 °F (36.2 °C) (Oral)   Resp 17   Ht 160 cm (63\")   Wt 71.2 kg (157 lb)   SpO2 98%   BMI 27.81 kg/m²     Physical Exam  Vitals and nursing note reviewed.   Constitutional:       General: She is not in acute distress.     Appearance: Normal appearance. She is normal weight. She is not ill-appearing or toxic-appearing.   HENT:      Head: Normocephalic and atraumatic.      Mouth/Throat:      Mouth: Mucous membranes are moist.      Pharynx: Oropharynx is clear. No oropharyngeal exudate.   Eyes:      General: No scleral icterus.     Extraocular Movements: Extraocular movements intact.      Conjunctiva/sclera: Conjunctivae normal.      Pupils: Pupils are equal, round, and reactive to light.   Cardiovascular:      Rate and Rhythm: Normal rate and regular rhythm.      Pulses: Normal pulses.      Heart sounds: Normal heart sounds.   Pulmonary:      Effort: Pulmonary effort is normal. No respiratory distress.      Breath sounds: Normal breath sounds.   Abdominal:      General: Abdomen is flat. Bowel sounds are normal. There is no distension.      Palpations: Abdomen is soft. There is no mass.      Tenderness: There is no abdominal tenderness. There is no guarding or rebound.      Hernia: No hernia is present.   Skin:     General: Skin is warm and dry.      Capillary Refill: Capillary refill takes less than 2 seconds.      Coloration: Skin is pale. Skin is not jaundiced.   Neurological:      General: No focal deficit present.      Mental Status: She is alert and oriented to person, place, " and time.   Psychiatric:         Mood and Affect: Mood normal.         Behavior: Behavior normal.         Thought Content: Thought content normal.         Judgment: Judgment normal.         Lab  I have personally reviewed most recent cardiac tracings, lab results and radiology images and interpretations and agree with findings.    Lab Results   Component Value Date    WBC 6.56 10/09/2022    HGB 9.5 (L) 10/09/2022    HGB 9.0 (L) 10/09/2022    HGB 9.0 (L) 10/09/2022    MCV 90.1 10/09/2022     10/09/2022    INR 1.15 06/08/2021       Lab Results   Component Value Date    GLUCOSE 57 (L) 10/08/2022    BUN 65 (H) 10/08/2022    CREATININE 0.95 10/08/2022    EGFRIFNONA 41 (L) 02/25/2022    BCR 68.4 (H) 10/08/2022     10/08/2022    K 4.0 10/08/2022    CO2 25.0 10/08/2022    CALCIUM 8.3 (L) 10/08/2022    ALBUMIN 2.50 (L) 10/07/2022    ALKPHOS 145 (H) 10/07/2022    BILITOT 0.7 10/07/2022    BILIDIR 0.3 07/26/2022    ALT 6 10/07/2022    AST 17 10/07/2022       Assessment:      GIB (gastrointestinal bleeding)    Diabetic foot ulcer (HCC)    PVD (peripheral vascular disease) ([facility])    Diabetes mellitus with neuropathy (HCC)    Essential hypertension    Stage 3a chronic kidney disease (HCC)    NICM (nonischemic cardiomyopathy) ([facility])    Coronary artery disease involving native coronary artery of native heart without angina pectoris    Dyslipidemia    Chronic combined systolic and diastolic heart failure (HCC)    Degeneration of lumbar or lumbosacral intervertebral disc    Anemia, chronic disease    Demand ischemia (HCC)    Sleep apnea    Vasovagal syncope    Paroxysmal atrial fibrillation (HCC)    1.  Gastrointestinal bleeding of upper source, gastric ulcers with bleeding  2.  Acute blood loss anemia, improving  3 type 2 diabetes mellitus  4.  Atrial fibrillation  5.  Constipation    Plan:  Patient doing well.  Hemoglobin remained stable and continues to improve daily.  >>> Okay to resume anticoagulation tomorrow  >>>  Continue twice daily PPI x30 days; daily thereafter.  >>> For constipation, will purge bowels with milk of mag.  >>> Daily MiraLAX   -If no BM every 3 days, Dulcolax suppository.    As hemoglobin is stable and no further evidence of gastrointestinal bleeding, GI will sign off.  Please call us with any further questions or concerns.    Denny Zelaya, APRN  10/09/22  17:56 EDT

## 2022-10-09 NOTE — PLAN OF CARE
Goal Outcome Evaluation:  Plan of Care Reviewed With: patient           Outcome Evaluation: Pt slept through night. 2L NC was placed on pt while sleeping. No other complaints. VSS. NSR on monitor.

## 2022-10-09 NOTE — PROGRESS NOTES
[facility]  IP Progress Note      Chief Complaint/Reason for service: History of atrial fibrillation on Eliquis presents with significant GI blood loss.  #2 heart failure with preserved EF.  #3 hypertension    Subjective   Subjective: The patient denies any shortness of breath.  While here she has been transfused and given IV fluids.  She denies chest pain.  No nausea or vomiting    Past medical, surgical, social and family history reviewed in the patient's electronic medical record.    Objective     Vital Sign Min/Max for last 24 hours  Temp  Min: 96.4 °F (35.8 °C)  Max: 97.8 °F (36.6 °C)   BP  Min: 108/42  Max: 152/74   Pulse  Min: 59  Max: 84   Resp  Min: 16  Max: 18   SpO2  Min: 94 %  Max: 99 %   Flow (L/min)  Min: 2  Max: 2      Intake/Output Summary (Last 24 hours) at 10/9/2022 0827  Last data filed at 10/8/2022 2139  Gross per 24 hour   Intake 3436.25 ml   Output 900 ml   Net 2536.25 ml             Current Facility-Administered Medications:   •  acetaminophen (TYLENOL) tablet 650 mg, 650 mg, Oral, Q6H PRN, Linda Lakhani, APRN  •  amoxicillin-clavulanate (AUGMENTIN) 875-125 MG per tablet 1 tablet, 1 tablet, Oral, Q24H, Linda Lakhani APRN, 1 tablet at 10/08/22 2139  •  atorvastatin (LIPITOR) tablet 40 mg, 40 mg, Oral, Daily, Linda Lakhani APRN, 40 mg at 10/08/22 0838  •  bisacodyl (DULCOLAX) suppository 10 mg, 10 mg, Rectal, Daily PRN, Linda Lakhani, APRN  •  bumetanide (BUMEX) tablet 0.5 mg, 0.5 mg, Oral, Daily, Fortino Constantino MD, 0.5 mg at 10/08/22 0838  •  dextrose (D50W) (25 g/50 mL) IV injection 25 g, 25 g, Intravenous, Q15 Min PRN, Linda Lakhani R, APRN  •  dextrose (GLUTOSE) oral gel 15 g, 15 g, Oral, Q15 Min PRN, Linda Lakhani, APRN  •  glucagon (human recombinant) (GLUCAGEN DIAGNOSTIC) injection 1 mg, 1 mg, Intramuscular, Q15 Min PRN, Linda Lakhani APRN  •  guaiFENesin (MUCINEX) 12 hr tablet 1,200 mg, 1,200 mg, Oral, BID, Kar  Linda R, APRN, 1,200 mg at 10/08/22 2138  •  hydrALAZINE (APRESOLINE) tablet 25 mg, 25 mg, Oral, Q8H, Fortino Constantino MD, 25 mg at 10/09/22 0628  •  HYDROcodone-acetaminophen (NORCO) 5-325 MG per tablet 1 tablet, 1 tablet, Oral, Q6H PRN, Mann, Jessica, APRN, 1 tablet at 10/08/22 1344  •  Insulin Lispro (humaLOG) injection 0-9 Units, 0-9 Units, Subcutaneous, TID AC, Kar Linda R, APRN, 2 Units at 10/08/22 1659  •  lactobacillus acidophilus (RISAQUAD) capsule 1 capsule, 1 capsule, Oral, Daily, Leni Lakhanica OLIVIA, APRN, 1 capsule at 10/08/22 0838  •  magnesium sulfate 4 gram infusion - Mg less than or equal to 1mg/dL, 4 g, Intravenous, PRN **OR** magnesium sulfate 3 gram infusion (1gm x 3) - Mg 1.1 - 1.5 mg/dL, 1 g, Intravenous, PRN **OR** Magnesium Sulfate 2 gram infusion- Mg 1.6 - 1.9 mg/dL, 2 g, Intravenous, PRN, Leni Lakhanica R, APRN  •  metoprolol tartrate (LOPRESSOR) half tablet 12.5 mg, 12.5 mg, Oral, Q12H, Linda Lakhani, APRN, 12.5 mg at 10/08/22 2139  •  ondansetron (ZOFRAN) injection 4 mg, 4 mg, Intravenous, Q6H PRN, Linda Lakhani R, APRN  •  pantoprazole (PROTONIX) injection 40 mg, 40 mg, Intravenous, Q12H, Linda Lakhani, APRN, 40 mg at 10/08/22 2139  •  sennosides-docusate (PERICOLACE) 8.6-50 MG per tablet 2 tablet, 2 tablet, Oral, BID, Linda Lakhani, APRN, 2 tablet at 10/08/22 0838  •  sodium chloride 0.9 % flush 10 mL, 10 mL, Intravenous, PRN, Jah Hooper MD  •  sodium chloride 0.9 % flush 10 mL, 10 mL, Intravenous, Q12H, Linda Lakhani APRN, 10 mL at 10/08/22 2139  •  sodium chloride 0.9 % flush 10 mL, 10 mL, Intravenous, PRN, Linda Lakhani APRN  •  vitamin B-12 (CYANOCOBALAMIN) tablet 100 mcg, 100 mcg, Oral, Daily, Linda Lakhani APRN, 100 mcg at 10/08/22 0839    Physical Exam: General well-developed early female sleeping but awakens easily.  Not dyspneic not tachypneic        HEENT: No JVP.  No icterus       Respiratory: Equal bilateral symmetrical  expansion and clear to auscultation bilaterally       Cardiovascular: Regular rate and rhythm with a grade 3 over systolic ejection murmur and no edema to palpation       GI: Soft and flat       Lower Extremities: No lesions       Neuro: Facial expressions are symmetrical moves all 4 extremities       Skin: Warm and dry no edema to palpation       Psych: Pleasant affect    Results Review: The patient is a net 4.1 L positive since admission.  Heart rates in the 60s.  Blood pressure 122 146.  Hemoglobin is 9.0 is 9.4 prior to that and 10.1 prior to that.  Review of lab work shows she previously had a low iron level back in September.    Radiology Results:  Imaging Results (Last 72 Hours)     Procedure Component Value Units Date/Time    XR Chest 1 View [[id number]] Collected: 10/06/22 1612     Updated: 10/06/22 1616    Narrative:      DATE OF EXAM:  10/6/2022 4:02 PM     PROCEDURE:  XR CHEST 1 VW-     INDICATIONS:  Syncope.      COMPARISON:  08/26/2022.     TECHNIQUE:   Single radiographic view of the chest was obtained.     FINDINGS:  The heart size is normal. The pulmonary vascular markings are normal.  The lungs and pleural spaces are clear of active disease.  There are  chronic age-related changes involving the bony thorax and thoracic  aorta.       Impression:      No active disease.     This report was finalized on 10/6/2022 4:13 PM by Juliano Burgos MD.       CT Head Without Contrast [[id number]] Collected: 10/06/22 1544     Updated: 10/06/22 1548    Narrative:      DATE OF EXAM: 10/6/2022 3:02 PM     PROCEDURE: CT HEAD WO CONTRAST-     INDICATIONS: syncope     COMPARISON: 7/26/2022     TECHNIQUE: Routine transaxial and coronal reconstruction images were  obtained through the head without the administration of contrast.  Automated exposure control and iterative reconstruction methods were  used.     The radiation dose reduction device was turned on for each scan per the  ALARA (As Low as Reasonably Achievable)  protocol.     FINDINGS: Gray-white differentiation is maintained and there is no  evidence of intracranial hemorrhage, mass or mass effect. Age-related  changes of the brain are present including volume loss and typical  periventricular sequela of chronic small vessel ischemia. There is  otherwise no evidence of intracranial hemorrhage, mass or mass effect.  The ventricles are normal in size and configuration accounting for  surrounding volume loss. The orbits are normal and the paranasal sinuses  are grossly clear.       Impression:      Age-related changes of the brain as above, otherwise without  evidence of acute intracranial abnormality.        This report was finalized on 10/6/2022 3:45 PM by Babatunde Dinero.       CT Abdomen Pelvis Without Contrast [[id number]] Collected: 10/06/22 1523     Updated: 10/06/22 1533    Narrative:      DATE OF EXAM: 10/6/2022 3:02 PM     PROCEDURE: CT ABDOMEN PELVIS WO CONTRAST-     INDICATIONS: abd pain nv     COMPARISON: CT abdomen and pelvis 9/21/2022     TECHNIQUE: Routine transaxial slices were obtained through the abdomen  and pelvis without the administration of intravenous contrast.  Reconstructed coronal and sagittal images were also obtained. Automated  exposure control and iterative construction methods were used.     The radiation dose reduction device was turned on for each scan per the  ALARA (As Low as Reasonably Achievable) protocol.     FINDINGS:  Lung bases are grossly clear with similar peripheral  reticulations/chronic interstitial changes. Unremarkable appearance of  the liver. The gallbladder is surgically absent. Normal appearance of  the bile ducts. Unremarkable appearance of the spleen and pancreas.  Normal appearance of the adrenal glands, kidneys, ureters, and bladder.  The uterus is surgically absent. There is some inspissated stool in the  rectum. No evidence of bowel obstruction or definite inflammatory change  of the GI tract. No abdominopelvic free  fluid or conspicuous fat  stranding. No pneumoperitoneum. Scattered air within the subcutaneous  tissues of the anterior mid abdomen, nonspecific, possibly reflecting  sites of injection. There is atherosclerosis without aneurysm. No  lymphadenopathy. No acute osseous findings.        Impression:      No significant interval change. No acute abdominopelvic findings.     This report was finalized on 10/6/2022 3:29 PM by Kings Baez MD.             EKG: Sinus rhythm    ECHO: Preserved EF    Tele: Sinus rhythm no A. fib    Assessment   Assessment/Plan: 1 this patient has a history of atrial fibrillation.  She was anticoagulated with Eliquis.  The patient has been in sinus rhythm since her admission here and has remained in sinus rhythm. when she presented she was found to have significant anemia and EGD revealed ulcers.  Her anticoagulation has been held.  The patient continues to remain in sinus rhythm.  We will need to confer with Dr. Veras and decide when and if anticoagulation can be started.  2 heart failure with preserved EF-the patient is a net 4.1 L positive since admission.  I will go ahead and discontinue the patient's IV fluids so as to avoid any heart failure  3 anemia-secondary to GI blood loss.  Was previously low on iron I will check another iron profile and give her IV iron if she is still deficient    Mehran Camarena MD  10/09/22  08:27 EDT

## 2022-10-09 NOTE — PROGRESS NOTES
[facility] Medicine Services  PROGRESS NOTE    Patient Name: Susan Anderson  : 1940  MRN: [id number]    Date of Admission: 10/6/2022  Primary Care Physician: Arleen Doherty MD    Subjective   Subjective     CC: Follow-up GI bleed    HPI: No acute events overnight, patient is asleep/lethargic, does wake up but drifts back to sleep    ROS:  Unable to obtain complete ROS secondary to lethargy    Objective   Objective     Vital Signs:   Temp:  [96.4 °F (35.8 °C)-97.8 °F (36.6 °C)] 97.8 °F (36.6 °C)  Heart Rate:  [59-73] 68  Resp:  [16-18] 16  BP: (108-152)/(40-74) 108/42  Flow (L/min):  [2] 2     Physical Exam:  Constitutional: Chronically ill-appearing female no acute distress, sleepy  HENT: NCAT, mucous membranes moist  Respiratory: Clear to auscultation bilaterally, respiratory effort normal   Cardiovascular: RRR, no murmurs, rubs, or gallops  Gastrointestinal: Positive bowel sounds, soft, nontender, nondistended  Musculoskeletal: No bilateral ankle edema, wound on left foot under dressing  Psychiatric: DERICK  Neurologic: DERICK  Skin: No rashes    Results Reviewed:  LAB RESULTS:      Lab 10/09/22  0641 10/09/22  0023 10/08/22  1759 10/08/22  1233 10/08/22  0420 10/07/22  1840 10/07/22  0953 10/06/22  1450   WBC 6.56  --   --   --   --   --  7.10 9.26   HEMOGLOBIN 9.0*  9.0* 9.4* 10.1* 9.3* 9.5*   < > 6.8* 7.9*   HEMATOCRIT 26.3*  26.3* 28.7* 30.7* 27.3* 29.1*   < > 20.7* 23.8*   PLATELETS 242  --   --   --   --   --  250 333   NEUTROS ABS  --   --   --   --   --   --  4.65 6.19   IMMATURE GRANS (ABS)  --   --   --   --   --   --  0.24* 0.28*   LYMPHS ABS  --   --   --   --   --   --  1.39 1.82   MONOS ABS  --   --   --   --   --   --  0.57 0.75   EOS ABS  --   --   --   --   --   --  0.22 0.19   MCV 90.1  --   --   --   --   --  89.2 88.1    < > = values in this interval not displayed.         Lab 10/08/22  0430 10/07/22  0953 10/06/22  1450   SODIUM 143 140 137   POTASSIUM 4.0  3.6 3.9   CHLORIDE 109* 105 98   CO2 25.0 26.0 25.0   ANION GAP 9.0 9.0 14.0   BUN 65* 82* 93*   CREATININE 0.95 1.20* 1.45*   EGFR 59.6* 45.0* 35.9*   GLUCOSE 57* 77 170*   CALCIUM 8.3* 8.2* 9.1   MAGNESIUM  --   --  1.5*         Lab 10/07/22  0953 10/06/22  1450   TOTAL PROTEIN 4.5* 5.7*   ALBUMIN 2.50* 2.90*   GLOBULIN 2.0 2.8   ALT (SGPT) 6 7   AST (SGOT) 17 17   BILIRUBIN 0.7 0.7   ALK PHOS 145* 157*         Lab 10/06/22  1450   TROPONIN T 0.178*             Lab 10/06/22  1718   ABO TYPING A   RH TYPING Positive   ANTIBODY SCREEN Negative         Brief Urine Lab Results  (Last result in the past 365 days)      Color   Clarity   Blood   Leuk Est   Nitrite   Protein   CREAT   Urine HCG        10/06/22 1602 Yellow   Cloudy   Negative   Large (3+)   Negative   100 mg/dL (2+)                 Microbiology Results Abnormal     Procedure Component Value - Date/Time    Urine Culture - Urine, Urine, Catheter In/Out [[id number]]  (Normal) Collected: 10/06/22 1602    Lab Status: Final result Specimen: Urine, Catheter In/Out Updated: 10/1940     Urine Culture No growth          No radiology results from the last 24 hrs    Results for orders placed during the hospital encounter of 08/21/22    Adult Transthoracic Echo Complete W/ Cont if Necessary Per Protocol    Interpretation Summary  · Estimated left ventricular EF = 55%  · Mild to moderate mitral valve regurgitation is present.      I have reviewed the medications:  Scheduled Meds:amoxicillin-clavulanate, 1 tablet, Oral, Q24H  atorvastatin, 40 mg, Oral, Daily  bumetanide, 0.5 mg, Oral, Daily  guaiFENesin, 1,200 mg, Oral, BID  hydrALAZINE, 25 mg, Oral, Q8H  insulin lispro, 0-9 Units, Subcutaneous, TID AC  lactobacillus acidophilus, 1 capsule, Oral, Daily  metoprolol tartrate, 12.5 mg, Oral, Q12H  pantoprazole, 40 mg, Intravenous, Q12H  sennosides-docusate, 2 tablet, Oral, BID  sodium chloride, 10 mL, Intravenous, Q12H  vitamin B-12, 100 mcg, Oral,  Daily      Continuous Infusions:   PRN Meds:.•  acetaminophen  •  bisacodyl  •  dextrose  •  dextrose  •  glucagon (human recombinant)  •  HYDROcodone-acetaminophen  •  magnesium sulfate **OR** magnesium sulfate in D5W 1g/100mL (PREMIX) **OR** magnesium sulfate  •  ondansetron  •  sodium chloride  •  sodium chloride    Assessment & Plan   Assessment & Plan     Active Hospital Problems    Diagnosis  POA   • **GIB (gastrointestinal bleeding) [K92.2]  Yes   • Paroxysmal atrial fibrillation (HCC) [I48.0]  No   • Vasovagal syncope [R55]  Yes   • Sleep apnea [G47.30]  Yes   • Anemia, chronic disease [D63.8]  Yes   • Demand ischemia (HCC) [I24.8]  Yes   • Degeneration of lumbar or lumbosacral intervertebral disc [M51.37]  Yes   • Coronary artery disease involving native coronary artery of native heart without angina pectoris [I25.10]  Yes   • Chronic combined systolic and diastolic heart failure (HCC) [I50.42]  Yes   • NICM (nonischemic cardiomyopathy) ([facility]) [I42.8]  Yes   • Dyslipidemia [E78.5]  Yes   • Essential hypertension [I10]  Yes   • Diabetes mellitus with neuropathy ([facility]) [E11.40]  Yes   • Diabetic foot ulcer ([facility]) [E11.621, L97.509]  Yes   • Stage 3a chronic kidney disease (HCC) [N18.31]  Yes   • PVD (peripheral vascular disease) ([facility]) [I73.9]  Yes      Resolved Hospital Problems   No resolved problems to display.        Brief Hospital Course to date:  Susan Anderson is a 82 y.o. female with multiple medical problems that include but not limited to CKD stage III, atrial fibrillation, hypertension, PVD/PAD type 2 diabetes, left lower extremity infection/osteomyelitis s/p mid SFA to LLE with KRISTY, ELIUD who presents to the ED with dizziness, nausea, admitted with suspected GI bleed     Symptomatic acute blood loss anemia  Suspected GI bleed  Near syncope  -CT abd/pelvis is unremarkable, CT is head negative  -Hemoglobin dropped to 6.8, FOB was positive, suggestive of likely GI bleed  -she is s/p 1 unit PRBC 10/7/2022  with good response, repeat H&H is stable  -GI consulted, she is s/p EGD with finding of normal esophagus, nonbleeding gastric ulcers, pathology taken, recommend continue PPI and repeat EGD in 3 months      PAD/PVD  Non-healing Left foot ulcer/osteomyelitis  -Followed by Dr. Finney, s/p KRISTY to mid SFA 9/28  -Holding Plavix secondary to above, at least until Monday, 10/10/2022 per GI, okay to continue aspirin  -Continue oral antibiotics, currently on Augmentin/minocycline, followed by ID, Dr. Pete  -[facility], PT/OT following     Relatively well-controlled type 2 diabetes A1c 7.8%  -FSBG's reviewed and are appropriate  -Continue SSI for now adjust as warranted.     Hypertension  -BP is much improved, resume home hydralazine     Paroxysmal atrial fibrillation  -Rate seems to be controlled, continue metoprolol  -Continue holding Eliquis due to likely GI bleed, defer resumption to cardiology/GI  -Cardiology following     Ischemic cardiomyopathy, HFpEF  -Seems to be currently compensated  -Continues beta-blocker, statin and bumex     CKD stage III  -Baseline creatinine~1.3-1.4 currently stable  -Patient followed by KAYKAY     Hypomagnesemia  -Continue to monitor and replete per protocol     Elevated troponin  -Chronic in the setting of CKD, severe PVD and ICM  -No further work-up is planned at this time     Expected Discharge Location and Transportation: Home versus rehab  Expected Discharge Date:10/10/22     DVT prophylaxis:  Mechanical DVT prophylaxis orders are present.     AM-PAC 6 Clicks Score (PT): 16 (10/08/22 1010)    CODE STATUS:   Code Status and Medical Interventions:   Ordered at: 10/06/22 5007     Medical Intervention Limits:    NO intubation (DNI)    NO cardioversion     Level Of Support Discussed With:    Patient     Code Status (Patient has no pulse and is not breathing):    No CPR (Do Not Attempt to Resuscitate)     Medical Interventions (Patient has pulse or is breathing):    Limited Support       Fortino  MD Dougie  10/09/22

## 2022-10-09 NOTE — PROGRESS NOTES
INFECTIOUS DISEASE PROGRESS NOTE    Susan Anderson  1940  [id number]    Date of Consult: 10/7/2022    Admission Date: 10/6/2022      Requesting Provider: Jason Garcia DO  Evaluating Physician: Stef Pete MD    Reason for Consultation: Bilateral foot infections    History of present illness:    10/7/2022: Patient is a 82 y.o. female with h/o type 2 diabetes mellitus, systolic/diastolic congestive heart failure, stage II-3 chronic kidney disease, chronic right leg lymphedema, afib/Eliquis, coronary artery disease, gout, asthma, peripheral vascular disease, MSSA right hallux osteomyelitis requiring transmetatarsal amputation, cervical cancer, and a recent left dorsal foot ulcer with cellulitis who we were asked to see for reassessment of bilateral foot infections in setting of GI bleed.  She has been on prolonged oral Augmentin for residual MSSA foot infection after the infection extended over into the second metatarsal phalangeal region.  This wound has gradually improved and is now healed but she remains at high risk for persistent infection/relapse.  She developed a distal left dorsal foot ulcer earlier in 9/2022 and was placed on doxycycline along with silver dressing therapy at her nursing facility. She was hospitalized at [facility] from 9/21-9/30/22 during which she underwent left SFA angioplasty and stent placement. She was discharged on oral Augmentin and oral minocycline.  Her left dorsal foot wound look significantly better this past week in the office.      She returned to [facility] ED on 10/6 for a near syncopal event.  She was doing well over the past week.  She recently restarted Plavix.  On 10/5, she became weak and fatigued.  She has some nausea, but no fever or chills.  On 10/6, the patient went to bathroom as she felt she need to have a BM and had a near syncopal event.  On arrival, the patient is afebrile and hemodynamically stable.  Her admitting labs were WBC 9300 with 67% neutrophils, Hgb 7.9,  creatinine 1.45 (baseline around 1.2), and UA WBC TNTC with large LE/negative nitrite.  A urine culture is pending. She was transfused with 1U pRBC.  A CXR, CT scan of head, and CT scan of a/p all showed no acute findings.  She was continued on Augmentin and minocycline.     10/8/2022: She feels somewhat better today.  She has decreased dyspnea.  She denies increased left foot pain.  She denies nausea, vomiting, and abdominal pain.  She denies melena and hematochezia.  She has remained afebrile.  Her hemoglobin today is 9.3.    10/9/22:  She has remained afebrile overnight.Hemoglobin was 9.4 early this morning.  She remains confused.  She complains of severe fatigue.  She denies melena and hematochezia.    Past Medical History:   Diagnosis Date   • Arthritis    • Asthma 6/4 - double pneumonia    Currently on inhaler and nebulizer   • Atrial fibrillation (HCC) 8/21/2022   • Cancer (HCC)     cervical cancer, skin cancer   • CHF (congestive heart failure) (HCC) June 4, 2021   • Chronic kidney disease Related to diabetes   • Coronary artery disease 6/4/2021 DX for hear failure   • Diabetes mellitus (HCC) 30 years    Seeing Dr. Lam 1st time Aug 19   • Gout    • Hx of colonoscopy    • Hyperlipidemia Reference current labs x 2-3yrs approx   • Hypertension 30 years   • Migraine    • Mitral valve disease    • Mitral valve disease    • Osteomyelitis (HCC)    • Peripheral neuropathy    • Sleep apnea    • Type 2 diabetes mellitus (HCC)     30 years       Past Surgical History:   Procedure Laterality Date   • ABDOMINAL HYSTERECTOMY W/SALPINGECTOMY     • AMPUTATION  Right great toe, 1st 1/3 metatarsal -Reg 4/14/21   • AORTAGRAM N/A 4/9/2021    Procedure: AORTAGRAM WITH OR WITHOUT RUNOFFS, WITH Co2;  Surgeon: Trey Forrest MD;  Location: [facility];  Service: Vascular;  Laterality: N/A;   • AORTAGRAM N/A 9/28/2022    Procedure: CO2 ANGIOGRAM, LEFT SFA ANGIOPLASTY WITH DRUG ELUTING BALLOON, LEFT SFA STENT PLACEMENT ;   Surgeon: Trey Forrest MD;  Location:  [facility];  Service: Vascular;  Laterality: N/A;  FLUORO: 13.12  DOSE 162mGy  CONTRAST: Isovue 350: 15ml   • APPENDECTOMY     • BRAIN TUMOR EXCISION      laser surgery    • CARDIAC CATHETERIZATION N/A 6/21/2021    Procedure: LEFT HEART CATH;  Surgeon: Anjum Ceballos MD;  Location:  [facility];  Service: Cardiology;  Laterality: N/A;   • CATARACT EXTRACTION W/ INTRAOCULAR LENS  IMPLANT, BILATERAL     • CHOLECYSTECTOMY     • EYE SURGERY     • HYSTERECTOMY     • TOE SURGERY     • TRANS METATARSAL AMPUTATION Right 4/14/2021    Procedure: AMPUTATION TRANS METATARSAL RIGHT GREAT TOE;  Surgeon: Valdez Finney MD;  Location:  [facility];  Service: Vascular;  Laterality: Right;       Family History   Problem Relation Age of Onset   • Diabetes Mother         Type II   • Heart disease Father    • Heart attack Father    • Coronary artery disease Father    • Diabetes Father         Type II   • Diabetes Sister    • Diabetes Maternal Grandmother    • Diabetes Maternal Grandfather    • Diabetes Paternal Grandmother    • Diabetes Paternal Grandfather        Social History     Socioeconomic History   • Marital status:    • Number of children: 1   Tobacco Use   • Smoking status: Never   • Smokeless tobacco: Never   Vaping Use   • Vaping Use: Never used   Substance and Sexual Activity   • Alcohol use: Yes     Comment: Social drinking when not recovering from hospitalization   • Drug use: Never   • Sexual activity: Not Currently     Birth control/protection: None       Allergies   Allergen Reactions   • Baclofen Other (See Comments) and Hallucinations     PSYCHOSIS-POA REFUSES ADMINISTRATION OF THIS MED.   • Cephalexin Nausea Only   • Erythromycin Base Nausea Only   • Melatonin Other (See Comments)     Nightmares   • Oxycodone Nausea Only   • Sulfa Antibiotics Nausea Only         Medication:    Current Facility-Administered Medications:   •  acetaminophen (TYLENOL)  tablet 650 mg, 650 mg, Oral, Q6H PRN, Linda Lakhani, APRN  •  amoxicillin-clavulanate (AUGMENTIN) 875-125 MG per tablet 1 tablet, 1 tablet, Oral, Q24H, Linda Lakhani APRN, 1 tablet at 10/08/22 2139  •  atorvastatin (LIPITOR) tablet 40 mg, 40 mg, Oral, Daily, Linda Lakhani APRN, 40 mg at 10/08/22 0838  •  bisacodyl (DULCOLAX) suppository 10 mg, 10 mg, Rectal, Daily PRN, Linda Lakhani, APRN  •  bumetanide (BUMEX) tablet 0.5 mg, 0.5 mg, Oral, Daily, Fortino Constantino MD, 0.5 mg at 10/08/22 0838  •  dextrose (D50W) (25 g/50 mL) IV injection 25 g, 25 g, Intravenous, Q15 Min PRN, Linda Lakhani, APRN  •  dextrose (GLUTOSE) oral gel 15 g, 15 g, Oral, Q15 Min PRN, Linda Lakhani, APRN  •  glucagon (human recombinant) (GLUCAGEN DIAGNOSTIC) injection 1 mg, 1 mg, Intramuscular, Q15 Min PRN, Linda Lakhani APRN  •  guaiFENesin (MUCINEX) 12 hr tablet 1,200 mg, 1,200 mg, Oral, BID, Linda Lakhani APRN, 1,200 mg at 10/08/22 2138  •  hydrALAZINE (APRESOLINE) tablet 25 mg, 25 mg, Oral, Q8H, Fortino Constantino MD, 25 mg at 10/09/22 0628  •  HYDROcodone-acetaminophen (NORCO) 5-325 MG per tablet 1 tablet, 1 tablet, Oral, Q6H PRN, Jessica Darnell, APRN, 1 tablet at 10/08/22 1344  •  Insulin Lispro (humaLOG) injection 0-9 Units, 0-9 Units, Subcutaneous, TID AC, Linda Lakhani, APRN, 2 Units at 10/08/22 1659  •  lactobacillus acidophilus (RISAQUAD) capsule 1 capsule, 1 capsule, Oral, Daily, Linda Lakhani APRN, 1 capsule at 10/08/22 0838  •  magnesium sulfate 4 gram infusion - Mg less than or equal to 1mg/dL, 4 g, Intravenous, PRN **OR** magnesium sulfate 3 gram infusion (1gm x 3) - Mg 1.1 - 1.5 mg/dL, 1 g, Intravenous, PRN **OR** Magnesium Sulfate 2 gram infusion- Mg 1.6 - 1.9 mg/dL, 2 g, Intravenous, PRN, Linda Lakhani, APRN  •  metoprolol tartrate (LOPRESSOR) half tablet 12.5 mg, 12.5 mg, Oral, Q12H, Linda Lakhani APRN, 12.5 mg at 10/08/22 8073  •  ondansetron (ZOFRAN) injection 4 mg, 4 mg,  Intravenous, Q6H PRN, Linda Lakhani APRN  •  pantoprazole (PROTONIX) injection 40 mg, 40 mg, Intravenous, Q12H, Linda Lakhani APRN, 40 mg at 10/08/22 2139  •  sennosides-docusate (PERICOLACE) 8.6-50 MG per tablet 2 tablet, 2 tablet, Oral, BID, Linda Lakhani APRN, 2 tablet at 10/08/22 0838  •  sodium chloride 0.9 % flush 10 mL, 10 mL, Intravenous, PRN, Jah Hooper MD  •  sodium chloride 0.9 % flush 10 mL, 10 mL, Intravenous, Q12H, Linda Lakhani APRN, 10 mL at 10/08/22 2139  •  sodium chloride 0.9 % flush 10 mL, 10 mL, Intravenous, PRN, Linda Lakhani APRN  •  sodium chloride 0.9 % infusion, 125 mL/hr, Intravenous, Continuous, Trey Solitario APRN, Last Rate: 125 mL/hr at 10/08/22 2139, 125 mL/hr at 10/08/22 2139  •  vitamin B-12 (CYANOCOBALAMIN) tablet 100 mcg, 100 mcg, Oral, Daily, Linda Lakhani APRN, 100 mcg at 10/08/22 0839    Antibiotics:  Anti-Infectives (From admission, onward)    Ordered     Dose/Rate Route Frequency Start Stop    10/06/22 2058  amoxicillin-clavulanate (AUGMENTIN) 875-125 MG per tablet 1 tablet        Ordering Provider: Linda Lakhani APRN    1 tablet Oral Every 24 Hours 10/06/22 2200 10/28/22 2159            Review of Systems:  She is more alert and less confused.  She still cannot provide a reliable ROS due to cognitive decline      Physical Exam:   Vital Signs  Temp (24hrs), Av.9 °F (36.1 °C), Min:96.4 °F (35.8 °C), Max:97.4 °F (36.3 °C)    Temp  Min: 96.4 °F (35.8 °C)  Max: 97.4 °F (36.3 °C)  BP  Min: 114/57  Max: 152/74  Pulse  Min: 59  Max: 84  Resp  Min: 16  Max: 18  SpO2  Min: 94 %  Max: 99 %    GENERAL: Awake, appears confused, in no acute distress.   HEENT: Normocephalic, atraumatic.  PERRL. EOMI. No conjunctival injection. No icterus. Oropharynx clear without evidence of thrush or exudate.  NECK: Supple   HEART: RRR; No murmur, rubs, gallops.   LUNGS: Clear to auscultation bilaterally without wheezing, rales, rhonchi. Normal respiratory  effort. Nonlabored.  ABDOMEN: Soft, nontender, nondistended. . No rebound or guarding. NO mass or HSM.  EXT:  No cyanosis, clubbing or edema. No cord.  :  Without Bui catheter.  MSK:  FROM, no joint effusions.   SKIN: Warm and dry without cutaneous eruptions on Inspection/palpation.  Left dorsal foot with silver dressing. This wound appears substantially improved with increased granulation tissue and some reepithelialization along the wound margins.  The wound is now approximately 2-2.5 cm in diameter and 3 mm deep. No surrounding erythema.  This is continue to improve today.  NEURO: Awake, confused, not oriented.  PSYCHIATRIC: Cooperative with PE    Laboratory Data    Results from last 7 days   Lab Units 10/09/22  0023 10/08/22  1759 10/08/22  1233 10/07/22  1840 10/07/22  0953 10/06/22  1450   WBC 10*3/mm3  --   --   --   --  7.10 9.26   HEMOGLOBIN g/dL 9.4* 10.1* 9.3*   < > 6.8* 7.9*   HEMATOCRIT % 28.7* 30.7* 27.3*   < > 20.7* 23.8*   PLATELETS 10*3/mm3  --   --   --   --  250 333    < > = values in this interval not displayed.     Results from last 7 days   Lab Units 10/08/22  0430   SODIUM mmol/L 143   POTASSIUM mmol/L 4.0   CHLORIDE mmol/L 109*   CO2 mmol/L 25.0   BUN mg/dL 65*   CREATININE mg/dL 0.95   GLUCOSE mg/dL 57*   CALCIUM mg/dL 8.3*     Results from last 7 days   Lab Units 10/07/22  0953   ALK PHOS U/L 145*   BILIRUBIN mg/dL 0.7   ALT (SGPT) U/L 6   AST (SGOT) U/L 17                         Estimated Creatinine Clearance: 42.4 mL/min (by C-G formula based on SCr of 0.95 mg/dL).      Microbiology:  Urine cx NGSF          Radiology:  Imaging Results (Last 72 Hours)     Procedure Component Value Units Date/Time    XR Chest 1 View [[id number]] Collected: 10/06/22 1612     Updated: 10/06/22 1616    Narrative:      DATE OF EXAM:  10/6/2022 4:02 PM     PROCEDURE:  XR CHEST 1 VW-     INDICATIONS:  Syncope.      COMPARISON:  08/26/2022.     TECHNIQUE:   Single radiographic view of the chest was obtained.      FINDINGS:  The heart size is normal. The pulmonary vascular markings are normal.  The lungs and pleural spaces are clear of active disease.  There are  chronic age-related changes involving the bony thorax and thoracic  aorta.       Impression:      No active disease.     This report was finalized on 10/6/2022 4:13 PM by Juliano Burgos MD.       CT Head Without Contrast [[id number]] Collected: 10/06/22 1544     Updated: 10/06/22 1548    Narrative:      DATE OF EXAM: 10/6/2022 3:02 PM     PROCEDURE: CT HEAD WO CONTRAST-     INDICATIONS: syncope     COMPARISON: 7/26/2022     TECHNIQUE: Routine transaxial and coronal reconstruction images were  obtained through the head without the administration of contrast.  Automated exposure control and iterative reconstruction methods were  used.     The radiation dose reduction device was turned on for each scan per the  ALARA (As Low as Reasonably Achievable) protocol.     FINDINGS: Gray-white differentiation is maintained and there is no  evidence of intracranial hemorrhage, mass or mass effect. Age-related  changes of the brain are present including volume loss and typical  periventricular sequela of chronic small vessel ischemia. There is  otherwise no evidence of intracranial hemorrhage, mass or mass effect.  The ventricles are normal in size and configuration accounting for  surrounding volume loss. The orbits are normal and the paranasal sinuses  are grossly clear.       Impression:      Age-related changes of the brain as above, otherwise without  evidence of acute intracranial abnormality.        This report was finalized on 10/6/2022 3:45 PM by Babatunde Dinero.       CT Abdomen Pelvis Without Contrast [[id number]] Collected: 10/06/22 1523     Updated: 10/06/22 1533    Narrative:      DATE OF EXAM: 10/6/2022 3:02 PM     PROCEDURE: CT ABDOMEN PELVIS WO CONTRAST-     INDICATIONS: abd pain nv     COMPARISON: CT abdomen and pelvis 9/21/2022     TECHNIQUE: Routine transaxial  slices were obtained through the abdomen  and pelvis without the administration of intravenous contrast.  Reconstructed coronal and sagittal images were also obtained. Automated  exposure control and iterative construction methods were used.     The radiation dose reduction device was turned on for each scan per the  ALARA (As Low as Reasonably Achievable) protocol.     FINDINGS:  Lung bases are grossly clear with similar peripheral  reticulations/chronic interstitial changes. Unremarkable appearance of  the liver. The gallbladder is surgically absent. Normal appearance of  the bile ducts. Unremarkable appearance of the spleen and pancreas.  Normal appearance of the adrenal glands, kidneys, ureters, and bladder.  The uterus is surgically absent. There is some inspissated stool in the  rectum. No evidence of bowel obstruction or definite inflammatory change  of the GI tract. No abdominopelvic free fluid or conspicuous fat  stranding. No pneumoperitoneum. Scattered air within the subcutaneous  tissues of the anterior mid abdomen, nonspecific, possibly reflecting  sites of injection. There is atherosclerosis without aneurysm. No  lymphadenopathy. No acute osseous findings.        Impression:      No significant interval change. No acute abdominopelvic findings.     This report was finalized on 10/6/2022 3:29 PM by Kings Baez MD.               Impression:   1. Left dorsal foot ulcer/wound infection- Wound is now dramatically better after vascular intervention.  She underwent vascular intervention on 9/28. Her left dorsal foot ulcer appears significantly improved.  This has continued to improve.   2. MSSA right foot osteomyelitis- she had an extensive, limb threatening right foot infection which finally healed after multiple surgical interventions and prolonged intravenous followed by oral antibiotic therapy.  I have considered her a very high risk for relapse of her MSSA right foot osteomyelitis and I have left her  on chronic oral antibiotic suppression.  If she develops significant antibiotic side effects from Augmentin, we may need to discontinue the Augmentin. Her foot is now markedly improved.  3. Acute GI bleed.  NEW  4. Pyuria, possible UTI vs colonization.   5. Anemia secondary to GI bleed. 1U pRBC transfusion 10/6.  6. Near syncopal event  7. Acute renal insufficiency. ATN vs dehydration.   8. History of right knee hematoma-this was quite extensive and required hospitalization in 8/22.  This is now resolved.  9. Type 2 diabetes mellitus-this increases her risk for recurrent infections  10. Peripheral vascular disease-she has arterial peripheral vascular disease and also venous stasis disease.  11. Peripheral neuropathy  12. Cognitive impairment- she has had intermittent cognitive impairment that has at times appear to be medication related  13. Stage II-3 chronic kidney disease  14. Systolic/diastolic congestive heart failure  15. Atrial fibrillation/Eliquis  16.  Keflex (nausea), erm (nausea), Sulfa (nausea) intolerances.    PLAN/RECOMMENDATIONS:   1. Continue Augmentin 875 mg PO daily  2. Discontinue minocycline  3. Continue wound care        Stef Pete MD  10/9/2022  07:43 EDT

## 2022-10-10 DIAGNOSIS — D63.8 ANEMIA, CHRONIC DISEASE: ICD-10-CM

## 2022-10-10 DIAGNOSIS — I48.0 PAROXYSMAL ATRIAL FIBRILLATION: Primary | ICD-10-CM

## 2022-10-10 LAB
ANION GAP SERPL CALCULATED.3IONS-SCNC: 9 MMOL/L (ref 5–15)
BASOPHILS # BLD AUTO: 0.02 10*3/MM3 (ref 0–0.2)
BASOPHILS NFR BLD AUTO: 0.4 % (ref 0–1.5)
BUN SERPL-MCNC: 34 MG/DL (ref 8–23)
BUN/CREAT SERPL: 38.2 (ref 7–25)
CALCIUM SPEC-SCNC: 7.5 MG/DL (ref 8.6–10.5)
CHLORIDE SERPL-SCNC: 106 MMOL/L (ref 98–107)
CO2 SERPL-SCNC: 24 MMOL/L (ref 22–29)
CREAT SERPL-MCNC: 0.89 MG/DL (ref 0.57–1)
DEPRECATED RDW RBC AUTO: 56 FL (ref 37–54)
EGFRCR SERPLBLD CKD-EPI 2021: 64.4 ML/MIN/1.73
EOSINOPHIL # BLD AUTO: 0.16 10*3/MM3 (ref 0–0.4)
EOSINOPHIL NFR BLD AUTO: 3.2 % (ref 0.3–6.2)
ERYTHROCYTE [DISTWIDTH] IN BLOOD BY AUTOMATED COUNT: 17.3 % (ref 12.3–15.4)
GLUCOSE BLDC GLUCOMTR-MCNC: 145 MG/DL (ref 70–130)
GLUCOSE BLDC GLUCOMTR-MCNC: 150 MG/DL (ref 70–130)
GLUCOSE BLDC GLUCOMTR-MCNC: 277 MG/DL (ref 70–130)
GLUCOSE SERPL-MCNC: 164 MG/DL (ref 65–99)
HCT VFR BLD AUTO: 26.9 % (ref 34–46.6)
HCT VFR BLD AUTO: 27.5 % (ref 34–46.6)
HCT VFR BLD AUTO: 29.3 % (ref 34–46.6)
HGB BLD-MCNC: 8.8 G/DL (ref 12–15.9)
HGB BLD-MCNC: 9.4 G/DL (ref 12–15.9)
HGB BLD-MCNC: 9.9 G/DL (ref 12–15.9)
IMM GRANULOCYTES # BLD AUTO: 0.34 10*3/MM3 (ref 0–0.05)
IMM GRANULOCYTES NFR BLD AUTO: 6.9 % (ref 0–0.5)
LYMPHOCYTES # BLD AUTO: 1.25 10*3/MM3 (ref 0.7–3.1)
LYMPHOCYTES NFR BLD AUTO: 25.4 % (ref 19.6–45.3)
MCH RBC QN AUTO: 30 PG (ref 26.6–33)
MCHC RBC AUTO-ENTMCNC: 32.7 G/DL (ref 31.5–35.7)
MCV RBC AUTO: 91.8 FL (ref 79–97)
MONOCYTES # BLD AUTO: 0.59 10*3/MM3 (ref 0.1–0.9)
MONOCYTES NFR BLD AUTO: 12 % (ref 5–12)
NEUTROPHILS NFR BLD AUTO: 2.57 10*3/MM3 (ref 1.7–7)
NEUTROPHILS NFR BLD AUTO: 52.1 % (ref 42.7–76)
NRBC BLD AUTO-RTO: 0 /100 WBC (ref 0–0.2)
OVALOCYTES BLD QL SMEAR: NORMAL
PLAT MORPH BLD: NORMAL
PLATELET # BLD AUTO: 229 10*3/MM3 (ref 140–450)
PMV BLD AUTO: 10.5 FL (ref 6–12)
POTASSIUM SERPL-SCNC: 3.6 MMOL/L (ref 3.5–5.2)
RBC # BLD AUTO: 2.93 10*6/MM3 (ref 3.77–5.28)
SODIUM SERPL-SCNC: 139 MMOL/L (ref 136–145)
WBC MORPH BLD: NORMAL
WBC NRBC COR # BLD: 4.93 10*3/MM3 (ref 3.4–10.8)

## 2022-10-10 PROCEDURE — 63710000001 INSULIN LISPRO (HUMAN) PER 5 UNITS: Performed by: NURSE PRACTITIONER

## 2022-10-10 PROCEDURE — A9270 NON-COVERED ITEM OR SERVICE: HCPCS | Performed by: NURSE PRACTITIONER

## 2022-10-10 PROCEDURE — 97597 DBRDMT OPN WND 1ST 20 CM/<: CPT

## 2022-10-10 PROCEDURE — 63710000001 LACTOBACILLUS ACIDOPHILUS CAPSULE: Performed by: NURSE PRACTITIONER

## 2022-10-10 PROCEDURE — 85018 HEMOGLOBIN: CPT | Performed by: INTERNAL MEDICINE

## 2022-10-10 PROCEDURE — 85014 HEMATOCRIT: CPT | Performed by: INTERNAL MEDICINE

## 2022-10-10 PROCEDURE — 63710000001 AMOXICILLIN-CLAVULANATE 875-125 MG TABLET: Performed by: NURSE PRACTITIONER

## 2022-10-10 PROCEDURE — 63710000001 HYDRALAZINE 25 MG TABLET: Performed by: INTERNAL MEDICINE

## 2022-10-10 PROCEDURE — 99213 OFFICE O/P EST LOW 20 MIN: CPT | Performed by: INTERNAL MEDICINE

## 2022-10-10 PROCEDURE — 63710000001 CLOPIDOGREL 75 MG TABLET: Performed by: HOSPITALIST

## 2022-10-10 PROCEDURE — 97116 GAIT TRAINING THERAPY: CPT

## 2022-10-10 PROCEDURE — G0378 HOSPITAL OBSERVATION PER HR: HCPCS

## 2022-10-10 PROCEDURE — 99232 SBSQ HOSP IP/OBS MODERATE 35: CPT | Performed by: HOSPITALIST

## 2022-10-10 PROCEDURE — 63710000001 METOPROLOL TARTRATE 12.5 MG TABLET: Performed by: NURSE PRACTITIONER

## 2022-10-10 PROCEDURE — 63710000001 VITAMIN B-12 100 MCG TABLET: Performed by: NURSE PRACTITIONER

## 2022-10-10 PROCEDURE — 80048 BASIC METABOLIC PNL TOTAL CA: CPT | Performed by: INTERNAL MEDICINE

## 2022-10-10 PROCEDURE — A9270 NON-COVERED ITEM OR SERVICE: HCPCS | Performed by: HOSPITALIST

## 2022-10-10 PROCEDURE — 63710000001 BUMETANIDE 1 MG TABLET: Performed by: INTERNAL MEDICINE

## 2022-10-10 PROCEDURE — 85007 BL SMEAR W/DIFF WBC COUNT: CPT | Performed by: INTERNAL MEDICINE

## 2022-10-10 PROCEDURE — A9270 NON-COVERED ITEM OR SERVICE: HCPCS | Performed by: INTERNAL MEDICINE

## 2022-10-10 PROCEDURE — 63710000001 APIXABAN 2.5 MG TABLET: Performed by: INTERNAL MEDICINE

## 2022-10-10 PROCEDURE — 63710000001 SENNOSIDES-DOCUSATE 8.6-50 MG TABLET: Performed by: NURSE PRACTITIONER

## 2022-10-10 PROCEDURE — 63710000001 POLYETHYLENE GLYCOL 17 G PACK: Performed by: NURSE PRACTITIONER

## 2022-10-10 PROCEDURE — 63710000001 ATORVASTATIN 40 MG TABLET: Performed by: NURSE PRACTITIONER

## 2022-10-10 PROCEDURE — 97530 THERAPEUTIC ACTIVITIES: CPT

## 2022-10-10 PROCEDURE — 63710000001 GUAIFENESIN 600 MG TABLET SUSTAINED-RELEASE 12 HOUR: Performed by: NURSE PRACTITIONER

## 2022-10-10 PROCEDURE — 82962 GLUCOSE BLOOD TEST: CPT

## 2022-10-10 PROCEDURE — 85025 COMPLETE CBC W/AUTO DIFF WBC: CPT | Performed by: INTERNAL MEDICINE

## 2022-10-10 RX ORDER — CLOPIDOGREL BISULFATE 75 MG/1
75 TABLET ORAL DAILY
Status: DISCONTINUED | OUTPATIENT
Start: 2022-10-10 | End: 2022-10-12 | Stop reason: HOSPADM

## 2022-10-10 RX ADMIN — PANTOPRAZOLE SODIUM 40 MG: 40 INJECTION, POWDER, FOR SOLUTION INTRAVENOUS at 20:17

## 2022-10-10 RX ADMIN — SENNOSIDES AND DOCUSATE SODIUM 2 TABLET: 50; 8.6 TABLET ORAL at 20:17

## 2022-10-10 RX ADMIN — Medication 1 CAPSULE: at 08:20

## 2022-10-10 RX ADMIN — ATORVASTATIN CALCIUM 40 MG: 40 TABLET, FILM COATED ORAL at 08:20

## 2022-10-10 RX ADMIN — APIXABAN 2.5 MG: 2.5 TABLET, FILM COATED ORAL at 10:43

## 2022-10-10 RX ADMIN — POLYETHYLENE GLYCOL 3350 17 G: 17 POWDER, FOR SOLUTION ORAL at 08:19

## 2022-10-10 RX ADMIN — VITAM B12 100 MCG: 100 TAB at 08:21

## 2022-10-10 RX ADMIN — SENNOSIDES AND DOCUSATE SODIUM 2 TABLET: 50; 8.6 TABLET ORAL at 08:20

## 2022-10-10 RX ADMIN — AMOXICILLIN AND CLAVULANATE POTASSIUM 1 TABLET: 875; 125 TABLET, FILM COATED ORAL at 20:17

## 2022-10-10 RX ADMIN — HYDRALAZINE HYDROCHLORIDE 25 MG: 25 TABLET ORAL at 20:17

## 2022-10-10 RX ADMIN — Medication 10 ML: at 08:21

## 2022-10-10 RX ADMIN — GUAIFENESIN 1200 MG: 600 TABLET, EXTENDED RELEASE ORAL at 20:17

## 2022-10-10 RX ADMIN — HYDRALAZINE HYDROCHLORIDE 25 MG: 25 TABLET ORAL at 15:28

## 2022-10-10 RX ADMIN — BUMETANIDE 0.5 MG: 1 TABLET ORAL at 08:19

## 2022-10-10 RX ADMIN — GUAIFENESIN 1200 MG: 600 TABLET, EXTENDED RELEASE ORAL at 08:20

## 2022-10-10 RX ADMIN — Medication 12.5 MG: at 08:20

## 2022-10-10 RX ADMIN — Medication 12.5 MG: at 20:17

## 2022-10-10 RX ADMIN — INSULIN LISPRO 6 UNITS: 100 INJECTION, SOLUTION INTRAVENOUS; SUBCUTANEOUS at 17:38

## 2022-10-10 RX ADMIN — HYDRALAZINE HYDROCHLORIDE 25 MG: 25 TABLET ORAL at 05:30

## 2022-10-10 RX ADMIN — PANTOPRAZOLE SODIUM 40 MG: 40 INJECTION, POWDER, FOR SOLUTION INTRAVENOUS at 08:20

## 2022-10-10 RX ADMIN — INSULIN LISPRO 2 UNITS: 100 INJECTION, SOLUTION INTRAVENOUS; SUBCUTANEOUS at 08:17

## 2022-10-10 RX ADMIN — Medication 10 ML: at 20:18

## 2022-10-10 RX ADMIN — CLOPIDOGREL BISULFATE 75 MG: 75 TABLET ORAL at 10:48

## 2022-10-10 RX ADMIN — APIXABAN 2.5 MG: 2.5 TABLET, FILM COATED ORAL at 20:17

## 2022-10-10 NOTE — THERAPY WOUND CARE TREATMENT
Acute Care - Wound/Debridement Treatment Note  [facility]     Patient Name: Susan Anderson  : 1940  MRN: [id number]  Today's Date: 10/10/2022                Admit Date: 10/6/2022    Visit Dx:    ICD-10-CM ICD-9-CM   1. Syncope, unspecified syncope type  R55 780.2   2. Anemia, unspecified type  D64.9 285.9   3. LACEY (acute kidney injury) ([facility])  N17.9 584.9   4. Elevated troponin  R77.8 790.6   5. Anticoagulated  Z79.01 V58.61   6. Gastrointestinal hemorrhage, unspecified gastrointestinal hemorrhage type  K92.2 578.9   7. Gastrointestinal hemorrhage associated with gastric ulcer  K25.4 531.40       Patient Active Problem List   Diagnosis   • Diabetic foot ulcer ([facility])   • PVD (peripheral vascular disease) ([facility])   • Osteomyelitis ([facility])   • Diabetes mellitus with neuropathy ([facility])   • Essential hypertension   • Stage 3a chronic kidney disease ([facility])   • Pyogenic inflammation of bone ([facility])   • Hyperglycemia   • Pneumonia due to infectious organism   • Mitral valve disease   • NICM (nonischemic cardiomyopathy) ([facility])   • Coronary artery disease involving native coronary artery of native heart without angina pectoris   • Dyslipidemia   • Chronic combined systolic and diastolic heart failure ([facility])   • Lumbar stenosis with neurogenic claudication   • Spondylosis of lumbar region without myelopathy or radiculopathy   • Spondylolisthesis, lumbar region   • Degeneration of lumbar or lumbosacral intervertebral disc   • Gait disturbance   • Physical deconditioning   • Sarcopenia   • Weakness   • Anemia, chronic disease   • Fall   • Dizziness   • Demand ischemia ([facility])   • Effusion of right knee   • Right knee pain   • Pressure injury of skin of sacral region   • Sleep apnea   • GIB (gastrointestinal bleeding)   • Vasovagal syncope   • Hypomagnesemia   • Syncope, unspecified syncope type   • Paroxysmal atrial fibrillation ([facility])        Past Medical History:   Diagnosis Date   • Arthritis    • Asthma  - double pneumonia     Currently on inhaler and nebulizer   • Atrial fibrillation (HCC) 8/21/2022   • Cancer (HCC)     cervical cancer, skin cancer   • CHF (congestive heart failure) (HCC) June 4, 2021   • Chronic kidney disease Related to diabetes   • Coronary artery disease 6/4/2021 DX for hear failure   • Diabetes mellitus (HCC) 30 years    Seeing Dr. Lam 1st time Aug 19   • Gout    • Hx of colonoscopy    • Hyperlipidemia Reference current labs x 2-3yrs approx   • Hypertension 30 years   • Migraine    • Mitral valve disease    • Mitral valve disease    • Osteomyelitis (HCC)    • Peripheral neuropathy    • Sleep apnea    • Type 2 diabetes mellitus (HCC)     30 years        Past Surgical History:   Procedure Laterality Date   • ABDOMINAL HYSTERECTOMY W/SALPINGECTOMY     • AMPUTATION  Right great toe, 1st 1/3 metatarsal -Reg 4/14/21   • AORTAGRAM N/A 4/9/2021    Procedure: AORTAGRAM WITH OR WITHOUT RUNOFFS, WITH Co2;  Surgeon: Trey Forrest MD;  Location:  [facility];  Service: Vascular;  Laterality: N/A;   • AORTAGRAM N/A 9/28/2022    Procedure: CO2 ANGIOGRAM, LEFT SFA ANGIOPLASTY WITH DRUG ELUTING BALLOON, LEFT SFA STENT PLACEMENT ;  Surgeon: Trey Forrest MD;  Location:  [facility];  Service: Vascular;  Laterality: N/A;  FLUORO: 13.12  DOSE 162mGy  CONTRAST: Isovue 350: 15ml   • APPENDECTOMY     • BRAIN TUMOR EXCISION      laser surgery    • CARDIAC CATHETERIZATION N/A 6/21/2021    Procedure: LEFT HEART CATH;  Surgeon: Anjum Ceballos MD;  Location:  [facility];  Service: Cardiology;  Laterality: N/A;   • CATARACT EXTRACTION W/ INTRAOCULAR LENS  IMPLANT, BILATERAL     • CHOLECYSTECTOMY     • ENDOSCOPY N/A 10/7/2022    Procedure: ESOPHAGOGASTRODUODENOSCOPY;  Surgeon: Stef Veras MD;  Location:  [facility];  Service: Gastroenterology;  Laterality: N/A;   • EYE SURGERY     • HYSTERECTOMY     • TOE SURGERY     • TRANS METATARSAL AMPUTATION Right 4/14/2021    Procedure: AMPUTATION TRANS  METATARSAL RIGHT GREAT TOE;  Surgeon: Valdez Finney MD;  Location: [facility] OR;  Service: Vascular;  Laterality: Right;           Wound 09/21/22 1850 Bilateral medial coccyx Pressure Injury (Active)   Dressing Appearance intact;dry 10/10/22 0800   Closure Adhesive bandage 10/10/22 0800   Base blanchable;purple;pink;dry 10/10/22 0800   Periwound dry;intact 10/10/22 0800   Periwound Temperature warm 10/10/22 0800   Periwound Skin Turgor soft 10/10/22 0800   Care, Wound cleansed with 10/09/22 1630   Dressing Care dressing changed 10/09/22 1630   Periwound Care dry periwound area maintained 10/10/22 0800       Wound 10/07/22 1105 Right anterior foot Pressure Injury (Active)   Dressing Appearance dry;intact 10/10/22 0800   Closure Adhesive bandage 10/10/22 0800   Base clean 10/10/22 0800   Periwound Temperature warm 10/10/22 0800   Periwound Skin Turgor soft 10/10/22 0800   Periwound Care dry periwound area maintained 10/10/22 0800       Wound 10/08/22 1400 Left anterior foot Arterial Ulcer (Active)   Dressing Appearance dry;intact 10/10/22 1100   Closure Adhesive bandage 10/10/22 0800   Base moist;pink;yellow 10/10/22 1100   Periwound intact;dry 10/09/22 1858   Periwound Temperature warm 10/09/22 1858   Periwound Skin Turgor soft 10/09/22 1858   Drainage Characteristics/Odor serosanguineous 10/10/22 1100   Drainage Amount small 10/10/22 1100   Care, Wound cleansed with;wound cleanser;debrided 10/10/22 1100   Dressing Care low-adherent 10/10/22 0800   Periwound Care dry periwound area maintained 10/10/22 0800         WOUND DEBRIDEMENT  Total area of Debridement: 2cm2  Debridement Site 1  Location- Site 1: L ant foot wound  Selective Debridement- Site 1: Wound Surface <20cmsq  Instruments- Site 1: tweezers  Excised Tissue Description- Site 1: minimum, slough  Bleeding- Site 1: none               PT Assessment (last 12 hours)     PT Evaluation and Treatment     Row Name 10/10/22 1100          Physical Therapy Time and  Intention    Subjective Information complains of;fatigue;pain  -KW     Document Type wound care;therapy note (daily note)  -KW     Mode of Treatment individual therapy;physical therapy  -KW     Row Name 10/10/22 1100          General Information    Patient Profile Reviewed yes  -KW     Row Name 10/10/22 1100          Pain    Pre/Posttreatment Pain Comment RN in room working with patient for her pain  -KW     Row Name             Wound 09/21/22 1850 Bilateral medial coccyx Pressure Injury    Wound - Properties Group Placement Date: 09/21/22  - Placement Time: 1850  -LH Present on Hospital Admission: Y  -LH Side: Bilateral  -LH Orientation: medial  -LH Location: coccyx  -LH Primary Wound Type: Pressure inj  -MF, Stage 1 PI vs MASD/IAD     Retired Wound - Properties Group Placement Date: 09/21/22  - Placement Time: 1850  -LH Present on Hospital Admission: Y  -LH Side: Bilateral  -LH Orientation: medial  -LH Location: coccyx  -LH Primary Wound Type: Pressure inj  -MF, Stage 1 PI vs MASD/IAD     Retired Wound - Properties Group Date first assessed: 09/21/22  - Time first assessed: 1850  -LH Present on Hospital Admission: Y  -LH Side: Bilateral  -LH Location: coccyx  -LH Primary Wound Type: Pressure inj  -MF, Stage 1 PI vs MASD/IAD     Row Name             Wound 10/07/22 1105 Right anterior foot Pressure Injury    Wound - Properties Group Placement Date: 10/07/22  - Placement Time: 1105  -MC Side: Right  -MC Orientation: anterior  -MC Location: foot  -MC Primary Wound Type: Pressure inj  -MC    Retired Wound - Properties Group Placement Date: 10/07/22  - Placement Time: 1105  -MC Side: Right  -MC Orientation: anterior  -MC Location: foot  -MC Primary Wound Type: Pressure inj  -MC    Retired Wound - Properties Group Date first assessed: 10/07/22  - Time first assessed: 1105  -MC Side: Right  -MC Location: foot  -MC Primary Wound Type: Pressure inj  -MC    Row Name 10/10/22 1100          Wound 10/08/22 1400 Left  anterior foot Arterial Ulcer    Wound - Properties Group Placement Date: 10/08/22  -A Placement Time: 1400  -MFA Side: Left  -A Orientation: anterior  -MFA Location: foot  -MFA Primary Wound Type: Arterial ulc  -MFA    Dressing Appearance dry;intact  -KW     Base moist;pink;yellow  -KW     Drainage Characteristics/Odor serosanguineous  -KW     Drainage Amount small  -KW     Care, Wound cleansed with;wound cleanser;debrided  HFB, optifoam  -KW     Retired Wound - Properties Group Placement Date: 10/08/22  -A Placement Time: 1400  -MFA Side: Left  -MFA Orientation: anterior  -MFA Location: foot  -MFA Primary Wound Type: Arterial ulc  -MFA    Retired Wound - Properties Group Date first assessed: 10/08/22  -[facility] Time first assessed: 1400 -MFA Side: Left  -MFA Location: foot  -MFA Primary Wound Type: Arterial ulc  -MFA    Row Name 10/10/22 1100          Coping    Observed Emotional State calm;cooperative  -KW     Row Name 10/10/22 1100          Plan of Care Review    Plan of Care Reviewed With patient  -KW     Progress improving  -KW     Outcome Evaluation Patient's dorsal ulcer appears with pink base however is demonstrating small areas of granulation tissue around edges of wound. She continues to benefit from wound care  -KW     Row Name 10/10/22 1100          Positioning and Restraints    Pre-Treatment Position in bed  -KW     Post Treatment Position bed  -KW     In Bed supine;call light within reach;encouraged to call for assist;exit alarm on  -KW           User Key  (r) = Recorded By, (t) = Taken By, (c) = Cosigned By    Initials Name Provider Type    Avtar Mcgrath, PT Physical Therapist    Ting Alonso RN Registered Nurse    Shannon Barlow RN Registered Nurse    Funmilayo Concepcion, RN Registered Nurse    Debra Solorzano, SAHIL Physical Therapist              Physical Therapy Education     Title: PT OT SLP Therapies (In Progress)     Topic: Physical Therapy (Done)     Point: Mobility  training (Done)     Learning Progress Summary           Patient Acceptance, E,TB, VU by KW at 10/10/2022 1100    Comment: edcuated about continued benefit of wound care    Acceptance, E, VU by ML at 10/8/2022 1010                   Point: Home exercise program (Done)     Learning Progress Summary           Patient Acceptance, E,TB, VU by KW at 10/10/2022 1100    Comment: edcuated about continued benefit of wound care    Acceptance, E, VU by  at 10/8/2022 1010                   Point: Body mechanics (Done)     Learning Progress Summary           Patient Acceptance, E,TB, VU by KW at 10/10/2022 1100    Comment: edcuated about continued benefit of wound care    Acceptance, E, VU by ML at 10/8/2022 1010                   Point: Precautions (Done)     Learning Progress Summary           Patient Acceptance, E,TB, VU by KW at 10/10/2022 1100    Comment: edcuated about continued benefit of wound care    Acceptance, E, VU by ML at 10/8/2022 1010                               User Key     Initials Effective Dates Name Provider Type Discipline     04/22/21 -  Janeth Cervantes Physical Therapist PT     01/27/22 -  Debra Henriquez, SAHIL Physical Therapist PT                Recommendation and Plan  Anticipated Equipment Needs at Discharge (PT): other (see comments) (patient owns all needed DME)  Anticipated Discharge Disposition (PT): home with 24/7 care, home with outpatient therapy services  Planned Therapy Interventions (PT): wound care  Therapy Frequency (PT): daily  Plan of Care Reviewed With: patient   Progress: improving       Progress: improving  Outcome Evaluation: Patient's dorsal ulcer appears with pink base however is demonstrating small areas of granulation tissue around edges of wound. She continues to benefit from wound care  Plan of Care Reviewed With: patient            Time Calculation   PT Charges     Row Name 10/10/22 1100             Time Calculation    Start Time 1100  -         Untimed Charges     52801-Jyakzrwbk debridement 25  -KW         Total Minutes    Untimed Charges Total Minutes 25  -KW       Total Minutes 25  -KW            User Key  (r) = Recorded By, (t) = Taken By, (c) = Cosigned By    Initials Name Provider Type    Debra Solorzano PT Physical Therapist                  Therapy Charges for Today     Code Description Service Date Service Provider Modifiers Qty    [id number] HC PIPE DEBRIDE OPEN WOUND UP TO 20CM 10/10/2022 Dbera Henriquez PT GP 1            PT G-Codes  Outcome Measure Options: AM-PAC 6 Clicks Daily Activity (OT)  AM-PAC 6 Clicks Score (PT): 16  AM-PAC 6 Clicks Score (OT): 15       Debra Henriquez PT  10/10/2022

## 2022-10-10 NOTE — PLAN OF CARE
Goal Outcome Evaluation:           Progress: no change  Outcome Evaluation: VSS. 2LNC while alseep. PRN zofran given with relief. No reports of SOA. PRN tylenol given also. NSR on tele. Rested well.

## 2022-10-10 NOTE — PLAN OF CARE
Goal Outcome Evaluation:   VSS, H&H stable.   Pt constipated, required digital disimpaction, resulting in large amount of hardened stool.  Pt stated that she did feel relief afterwards.   Pt encouraged to increase fluid intake.  Miralax and colace given per orders.  Will continue to monitor.        Progress: improving

## 2022-10-10 NOTE — PROGRESS NOTES
INFECTIOUS DISEASE PROGRESS NOTE    Susan Anderson  1940  [id number]    Date of Consult: 10/7/2022    Admission Date: 10/6/2022      Requesting Provider: Jason Garcia DO  Evaluating Physician: Stef Pete MD    Reason for Consultation: Bilateral foot infections    History of present illness:    10/7/2022: Patient is a 82 y.o. female with h/o type 2 diabetes mellitus, systolic/diastolic congestive heart failure, stage II-3 chronic kidney disease, chronic right leg lymphedema, afib/Eliquis, coronary artery disease, gout, asthma, peripheral vascular disease, MSSA right hallux osteomyelitis requiring transmetatarsal amputation, cervical cancer, and a recent left dorsal foot ulcer with cellulitis who we were asked to see for reassessment of bilateral foot infections in setting of GI bleed.  She has been on prolonged oral Augmentin for residual MSSA foot infection after the infection extended over into the second metatarsal phalangeal region.  This wound has gradually improved and is now healed but she remains at high risk for persistent infection/relapse.  She developed a distal left dorsal foot ulcer earlier in 9/2022 and was placed on doxycycline along with silver dressing therapy at her nursing facility. She was hospitalized at [facility] from 9/21-9/30/22 during which she underwent left SFA angioplasty and stent placement. She was discharged on oral Augmentin and oral minocycline.  Her left dorsal foot wound look significantly better this past week in the office.      She returned to [facility] ED on 10/6 for a near syncopal event.  She was doing well over the past week.  She recently restarted Plavix.  On 10/5, she became weak and fatigued.  She has some nausea, but no fever or chills.  On 10/6, the patient went to bathroom as she felt she need to have a BM and had a near syncopal event.  On arrival, the patient is afebrile and hemodynamically stable.  Her admitting labs were WBC 9300 with 67% neutrophils, Hgb 7.9,  creatinine 1.45 (baseline around 1.2), and UA WBC TNTC with large LE/negative nitrite.  A urine culture is pending. She was transfused with 1U pRBC.  A CXR, CT scan of head, and CT scan of a/p all showed no acute findings.  She was continued on Augmentin and minocycline.     10/8/2022: She feels somewhat better today.  She has decreased dyspnea.  She denies increased left foot pain.  She denies nausea, vomiting, and abdominal pain.  She denies melena and hematochezia.  She has remained afebrile.  Her hemoglobin today is 9.3.    10/9/22:  She has remained afebrile overnight.Hemoglobin was 9.4 early this morning.  She remains confused.  She complains of severe fatigue.  She denies melena and hematochezia.    10/10/22: White blood cell count is 4.9 and her hemoglobin is 8.8.  Her creatinine is 0.89.  She has remained afebrile.  She denies increase dyspnea.     Past Medical History:   Diagnosis Date   • Arthritis    • Asthma 6/4 - double pneumonia    Currently on inhaler and nebulizer   • Atrial fibrillation (HCC) 8/21/2022   • Cancer (HCC)     cervical cancer, skin cancer   • CHF (congestive heart failure) (HCC) June 4, 2021   • Chronic kidney disease Related to diabetes   • Coronary artery disease 6/4/2021 DX for hear failure   • Diabetes mellitus (HCC) 30 years    Seeing Dr. Lam 1st time Aug 19   • Gout    • Hx of colonoscopy    • Hyperlipidemia Reference current labs x 2-3yrs approx   • Hypertension 30 years   • Migraine    • Mitral valve disease    • Mitral valve disease    • Osteomyelitis (HCC)    • Peripheral neuropathy    • Sleep apnea    • Type 2 diabetes mellitus (HCC)     30 years       Past Surgical History:   Procedure Laterality Date   • ABDOMINAL HYSTERECTOMY W/SALPINGECTOMY     • AMPUTATION  Right great toe, 1st 1/3 metatarsal -[place] 4/14/21   • AORTAGRAM N/A 4/9/2021    Procedure: AORTAGRAM WITH OR WITHOUT RUNOFFS, WITH Co2;  Surgeon: Trey Forrest MD;  Location: [facility];  Service:  Vascular;  Laterality: N/A;   • AORTAGRAM N/A 9/28/2022    Procedure: CO2 ANGIOGRAM, LEFT SFA ANGIOPLASTY WITH DRUG ELUTING BALLOON, LEFT SFA STENT PLACEMENT ;  Surgeon: Trey Forrest MD;  Location:  [facility];  Service: Vascular;  Laterality: N/A;  FLUORO: 13.12  DOSE 162mGy  CONTRAST: Isovue 350: 15ml   • APPENDECTOMY     • BRAIN TUMOR EXCISION      laser surgery    • CARDIAC CATHETERIZATION N/A 6/21/2021    Procedure: LEFT HEART CATH;  Surgeon: Anjum Ceballos MD;  Location:  [facility];  Service: Cardiology;  Laterality: N/A;   • CATARACT EXTRACTION W/ INTRAOCULAR LENS  IMPLANT, BILATERAL     • CHOLECYSTECTOMY     • EYE SURGERY     • HYSTERECTOMY     • TOE SURGERY     • TRANS METATARSAL AMPUTATION Right 4/14/2021    Procedure: AMPUTATION TRANS METATARSAL RIGHT GREAT TOE;  Surgeon: Valdez Finney MD;  Location:  [facility];  Service: Vascular;  Laterality: Right;       Family History   Problem Relation Age of Onset   • Diabetes Mother         Type II   • Heart disease Father    • Heart attack Father    • Coronary artery disease Father    • Diabetes Father         Type II   • Diabetes Sister    • Diabetes Maternal Grandmother    • Diabetes Maternal Grandfather    • Diabetes Paternal Grandmother    • Diabetes Paternal Grandfather        Social History     Socioeconomic History   • Marital status:    • Number of children: 1   Tobacco Use   • Smoking status: Never   • Smokeless tobacco: Never   Vaping Use   • Vaping Use: Never used   Substance and Sexual Activity   • Alcohol use: Yes     Comment: Social drinking when not recovering from hospitalization   • Drug use: Never   • Sexual activity: Not Currently     Birth control/protection: None       Allergies   Allergen Reactions   • Baclofen Other (See Comments) and Hallucinations     PSYCHOSIS-POA REFUSES ADMINISTRATION OF THIS MED.   • Cephalexin Nausea Only   • Erythromycin Base Nausea Only   • Melatonin Other (See Comments)      Nightmares   • Oxycodone Nausea Only   • Sulfa Antibiotics Nausea Only         Medication:    Current Facility-Administered Medications:   •  acetaminophen (TYLENOL) tablet 650 mg, 650 mg, Oral, Q6H PRN, Linda Lakhani, APRN, 650 mg at 10/09/22 2101  •  amoxicillin-clavulanate (AUGMENTIN) 875-125 MG per tablet 1 tablet, 1 tablet, Oral, Q24H, Linda Lakhani APRN, 1 tablet at 10/09/22 2101  •  atorvastatin (LIPITOR) tablet 40 mg, 40 mg, Oral, Daily, Linda Lakhani, APRN, 40 mg at 10/08/22 0838  •  bisacodyl (DULCOLAX) suppository 10 mg, 10 mg, Rectal, Daily PRN, Linda Lakhani, APRN  •  bumetanide (BUMEX) tablet 0.5 mg, 0.5 mg, Oral, Daily, Fortino Constantino MD, 0.5 mg at 10/09/22 0933  •  dextrose (D50W) (25 g/50 mL) IV injection 25 g, 25 g, Intravenous, Q15 Min PRN, Linda Lakhani, APRN  •  dextrose (GLUTOSE) oral gel 15 g, 15 g, Oral, Q15 Min PRN, Linda Lakhani, APRN  •  glucagon (human recombinant) (GLUCAGEN DIAGNOSTIC) injection 1 mg, 1 mg, Intramuscular, Q15 Min PRN, Linda Lakhani, APRN  •  guaiFENesin (MUCINEX) 12 hr tablet 1,200 mg, 1,200 mg, Oral, BID, Linda Lakhani, APRN, 1,200 mg at 10/09/22 2058  •  hydrALAZINE (APRESOLINE) tablet 25 mg, 25 mg, Oral, Q8H, Fortino Constantino MD, 25 mg at 10/10/22 0530  •  HYDROcodone-acetaminophen (NORCO) 5-325 MG per tablet 1 tablet, 1 tablet, Oral, Q6H PRN, Mann, Jessica, APRN, 1 tablet at 10/08/22 1344  •  Insulin Lispro (humaLOG) injection 0-9 Units, 0-9 Units, Subcutaneous, TID AC, Linda Lakhani, INDIGO, 2 Units at 10/09/22 1858  •  lactobacillus acidophilus (RISAQUAD) capsule 1 capsule, 1 capsule, Oral, Daily, Linda Lakhani APRN, 1 capsule at 10/08/22 0838  •  magnesium sulfate 4 gram infusion - Mg less than or equal to 1mg/dL, 4 g, Intravenous, PRN **OR** magnesium sulfate 3 gram infusion (1gm x 3) - Mg 1.1 - 1.5 mg/dL, 1 g, Intravenous, PRN **OR** Magnesium Sulfate 2 gram infusion- Mg 1.6 - 1.9 mg/dL, 2 g, Intravenous, PRN, Kar,  INDIGO Alexander  •  metoprolol tartrate (LOPRESSOR) half tablet 12.5 mg, 12.5 mg, Oral, Q12H, Linda Lakhani APRN, 12.5 mg at 10/09/22 2058  •  ondansetron (ZOFRAN) injection 4 mg, 4 mg, Intravenous, Q6H PRN, Linda Lakhani APRN, 4 mg at 10/09/22 2106  •  pantoprazole (PROTONIX) injection 40 mg, 40 mg, Intravenous, Q12H, Linda Lakhani APRN, 40 mg at 10/09/22 2058  •  polyethylene glycol (MIRALAX) packet 17 g, 17 g, Oral, Daily, Denny Zelaya APRN, 17 g at 10/09/22 1858  •  sennosides-docusate (PERICOLACE) 8.6-50 MG per tablet 2 tablet, 2 tablet, Oral, BID, Linda Lakhani APRN, 2 tablet at 10/09/22 2057  •  sodium chloride 0.9 % flush 10 mL, 10 mL, Intravenous, PRN, Jah Hooper MD  •  sodium chloride 0.9 % flush 10 mL, 10 mL, Intravenous, Q12H, Linda Lakhani APRN, 10 mL at 10/09/22 2058  •  sodium chloride 0.9 % flush 10 mL, 10 mL, Intravenous, PRN, Linda Lakhani APRN  •  vitamin B-12 (CYANOCOBALAMIN) tablet 100 mcg, 100 mcg, Oral, Daily, Linda Lakhani APRN, 100 mcg at 10/08/22 0839    Antibiotics:  Anti-Infectives (From admission, onward)    Ordered     Dose/Rate Route Frequency Start Stop    10/06/22 2058  amoxicillin-clavulanate (AUGMENTIN) 875-125 MG per tablet 1 tablet        Ordering Provider: Linda Lakhani APRN    1 tablet Oral Every 24 Hours 10/06/22 2200 10/28/22 2159            Review of Systems:  See HPI      Physical Exam:   Vital Signs  Temp (24hrs), Av.1 °F (36.2 °C), Min:96.4 °F (35.8 °C), Max:98.1 °F (36.7 °C)    Temp  Min: 96.4 °F (35.8 °C)  Max: 98.1 °F (36.7 °C)  BP  Min: 114/68  Max: 146/68  Pulse  Min: 60  Max: 79  Resp  Min: 12  Max: 17  SpO2  Min: 95 %  Max: 98 %    GENERAL: Awake, appears confused, in no acute distress.   HEENT: Normocephalic, atraumatic.  PERRL. EOMI. No conjunctival injection. No icterus. Oropharynx clear without evidence of thrush or exudate.  NECK: Supple   HEART: RRR; No murmur,  LUNGS: Clear to auscultation  bilaterally without wheezing, rales, rhonchi. Normal respiratory effort. Nonlabored.  ABDOMEN: Soft, nontender, nondistended. . No rebound or guarding. NO mass or HSM.  EXT:  No cyanosis, clubbing or edema. No cord.  :  Without Bui catheter.  MSK:  FROM, no joint effusions.   SKIN: Warm and dry without cutaneous eruptions on Inspection/palpation.  Left dorsal foot with silver dressing. This wound appears substantially improved with increased granulation tissue and some reepithelialization along the wound margins. This remains improved today.  NEURO: Awake, confused, not oriented.  PSYCHIATRIC: Cooperative with PE    Laboratory Data    Results from last 7 days   Lab Units 10/10/22  0631 10/09/22  2336 10/09/22  1815 10/09/22  1245 10/09/22  0641 10/07/22  1840 10/07/22  0953   WBC 10*3/mm3 4.93  --   --   --  6.56  --  7.10   HEMOGLOBIN g/dL 8.8* 8.6* 9.0*   < > 9.0*  9.0*   < > 6.8*   HEMATOCRIT % 26.9* 26.2* 26.7*   < > 26.3*  26.3*   < > 20.7*   PLATELETS 10*3/mm3 229  --   --   --  242  --  250    < > = values in this interval not displayed.     Results from last 7 days   Lab Units 10/10/22  0631   SODIUM mmol/L 139   POTASSIUM mmol/L 3.6   CHLORIDE mmol/L 106   CO2 mmol/L 24.0   BUN mg/dL 34*   CREATININE mg/dL 0.89   GLUCOSE mg/dL 164*   CALCIUM mg/dL 7.5*     Results from last 7 days   Lab Units 10/07/22  0953   ALK PHOS U/L 145*   BILIRUBIN mg/dL 0.7   ALT (SGPT) U/L 6   AST (SGOT) U/L 17                         Estimated Creatinine Clearance: 46.5 mL/min (by C-G formula based on SCr of 0.89 mg/dL).      Microbiology:  Urine cx NGSF          Radiology:  Imaging Results (Last 72 Hours)     ** No results found for the last 72 hours. **            Impression:   1. Left dorsal foot ulcer/wound infection- Wound is now dramatically better after vascular intervention.  She underwent vascular intervention on 9/28. Her left dorsal foot ulcer appears significantly improved.  This has continued to improve.    2. MSSA right foot osteomyelitis- she had an extensive, limb threatening right foot infection which finally healed after multiple surgical interventions and prolonged intravenous followed by oral antibiotic therapy.  I have considered her a very high risk for relapse of her MSSA right foot osteomyelitis and I have left her on chronic oral antibiotic suppression.  3. Acute GI bleed.  NEW  4. Pyuria, possible UTI vs colonization.   5. Anemia secondary to GI bleed. 1U pRBC transfusion 10/6.  6. Near syncopal event  7. Acute renal insufficiency. ATN vs dehydration.   8. History of right knee hematoma-this was quite extensive and required hospitalization in 8/22.  This is now resolved.  9. Type 2 diabetes mellitus-this increases her risk for recurrent infections  10. Peripheral vascular disease-she has arterial peripheral vascular disease and also venous stasis disease.  11. Peripheral neuropathy  12. Cognitive impairment- she has had intermittent cognitive impairment that has at times appear to be medication related  13. Stage II-3 chronic kidney disease  14. Systolic/diastolic congestive heart failure  15. Atrial fibrillation/Eliquis  16.  Keflex (nausea), erm (nausea), Sulfa (nausea) intolerances.    PLAN/RECOMMENDATIONS:   1. Continue Augmentin 875 mg PO daily  2. Continue wound care        Stef Pete MD  10/10/2022  08:19 EDT

## 2022-10-10 NOTE — CASE MANAGEMENT/SOCIAL WORK
Continued Stay Note  [facility]     Patient Name: Susan Anderson  MRN: [id number]  Today's Date: 10/10/2022    Admit Date: 10/6/2022    Plan: Home with family support   Discharge Plan     Row Name 10/10/22 1623       Plan    Plan Home with family support    Plan Comments Spoke with patient at discharge plan is home with family support and caregiver that stays with her at night. No needs discharge needs at this time. CM will follow.    Final Discharge Disposition Code 01 - home or self-care               Discharge Codes    No documentation.               Expected Discharge Date and Time     Expected Discharge Date Expected Discharge Time    Oct 11, 2022             Karis Barrera RN

## 2022-10-10 NOTE — PLAN OF CARE
Goal Outcome Evaluation:  Plan of Care Reviewed With: patient        Progress: improving  Outcome Evaluation: Patient's dorsal ulcer appears with pink base however is demonstrating small areas of granulation tissue around edges of wound. She continues to benefit from wound care

## 2022-10-10 NOTE — THERAPY TREATMENT NOTE
Patient Name: Susan Anderson  : 1940    MRN: [id number]                              Today's Date: 10/10/2022       Admit Date: 10/6/2022    Visit Dx:     ICD-10-CM ICD-9-CM   1. Syncope, unspecified syncope type  R55 780.2   2. Anemia, unspecified type  D64.9 285.9   3. LACEY (acute kidney injury) ([facility])  N17.9 584.9   4. Elevated troponin  R77.8 790.6   5. Anticoagulated  Z79.01 V58.61   6. Gastrointestinal hemorrhage, unspecified gastrointestinal hemorrhage type  K92.2 578.9   7. Gastrointestinal hemorrhage associated with gastric ulcer  K25.4 531.40     Patient Active Problem List   Diagnosis   • Diabetic foot ulcer ([facility])   • PVD (peripheral vascular disease) ([facility])   • Osteomyelitis ([facility])   • Diabetes mellitus with neuropathy ([facility])   • Essential hypertension   • Stage 3a chronic kidney disease ([facility])   • Pyogenic inflammation of bone ([facility])   • Hyperglycemia   • Pneumonia due to infectious organism   • Mitral valve disease   • NICM (nonischemic cardiomyopathy) ([facility])   • Coronary artery disease involving native coronary artery of native heart without angina pectoris   • Dyslipidemia   • Chronic combined systolic and diastolic heart failure ([facility])   • Lumbar stenosis with neurogenic claudication   • Spondylosis of lumbar region without myelopathy or radiculopathy   • Spondylolisthesis, lumbar region   • Degeneration of lumbar or lumbosacral intervertebral disc   • Gait disturbance   • Physical deconditioning   • Sarcopenia   • Weakness   • Anemia, chronic disease   • Fall   • Dizziness   • Demand ischemia ([facility])   • Effusion of right knee   • Right knee pain   • Pressure injury of skin of sacral region   • Sleep apnea   • GIB (gastrointestinal bleeding)   • Vasovagal syncope   • Hypomagnesemia   • Syncope, unspecified syncope type   • Paroxysmal atrial fibrillation ([facility])     Past Medical History:   Diagnosis Date   • Arthritis    • Asthma / - double pneumonia    Currently on inhaler and nebulizer   • Atrial  fibrillation (HCC) 8/21/2022   • Cancer (HCC)     cervical cancer, skin cancer   • CHF (congestive heart failure) (HCC) June 4, 2021   • Chronic kidney disease Related to diabetes   • Coronary artery disease 6/4/2021 DX for hear failure   • Diabetes mellitus (HCC) 30 years    Seeing Dr. Lam 1st time Aug 19   • Gout    • Hx of colonoscopy    • Hyperlipidemia Reference current labs x 2-3yrs approx   • Hypertension 30 years   • Migraine    • Mitral valve disease    • Mitral valve disease    • Osteomyelitis (HCC)    • Peripheral neuropathy    • Sleep apnea    • Type 2 diabetes mellitus (HCC)     30 years     Past Surgical History:   Procedure Laterality Date   • ABDOMINAL HYSTERECTOMY W/SALPINGECTOMY     • AMPUTATION  Right great toe, 1st 1/3 metatarsal -[place] 4/14/21   • AORTAGRAM N/A 4/9/2021    Procedure: AORTAGRAM WITH OR WITHOUT RUNOFFS, WITH Co2;  Surgeon: Trey Forrest MD;  Location:  [facility];  Service: Vascular;  Laterality: N/A;   • AORTAGRAM N/A 9/28/2022    Procedure: CO2 ANGIOGRAM, LEFT SFA ANGIOPLASTY WITH DRUG ELUTING BALLOON, LEFT SFA STENT PLACEMENT ;  Surgeon: Trey Forrest MD;  Location:  [facility];  Service: Vascular;  Laterality: N/A;  FLUORO: 13.12  DOSE 162mGy  CONTRAST: Isovue 350: 15ml   • APPENDECTOMY     • BRAIN TUMOR EXCISION      laser surgery    • CARDIAC CATHETERIZATION N/A 6/21/2021    Procedure: LEFT HEART CATH;  Surgeon: Anjum Ceballos MD;  Location:  [facility] LOCATION;  Service: Cardiology;  Laterality: N/A;   • CATARACT EXTRACTION W/ INTRAOCULAR LENS  IMPLANT, BILATERAL     • CHOLECYSTECTOMY     • ENDOSCOPY N/A 10/7/2022    Procedure: ESOPHAGOGASTRODUODENOSCOPY;  Surgeon: Stef Veras MD;  Location:  [facility];  Service: Gastroenterology;  Laterality: N/A;   • EYE SURGERY     • HYSTERECTOMY     • TOE SURGERY     • TRANS METATARSAL AMPUTATION Right 4/14/2021    Procedure: AMPUTATION TRANS METATARSAL RIGHT GREAT TOE;  Surgeon: Valdez Finney MD;   Location: [facility];  Service: Vascular;  Laterality: Right;      General Information     Row Name 10/10/22 1455          Physical Therapy Time and Intention    Document Type therapy note (daily note)  -     Mode of Treatment individual therapy;physical therapy  -     Row Name 10/10/22 1455          General Information    Existing Precautions/Restrictions fall  -     Row Name 10/10/22 1455          Cognition    Orientation Status (Cognition) oriented x 4  -     Row Name 10/10/22 1455          Safety Issues, Functional Mobility    Safety Issues Affecting Function (Mobility) insight into deficits/self-awareness;sequencing abilities;safety precaution awareness  -     Impairments Affecting Function (Mobility) balance;endurance/activity tolerance;strength;pain  -           User Key  (r) = Recorded By, (t) = Taken By, (c) = Cosigned By    Initials Name Provider Type    Johanny Ortiz PT Physical Therapist               Mobility     Row Name 10/10/22 1532          Bed Mobility    Bed Mobility supine-sit  -SJ     Supine-Sit [place] (Bed Mobility) minimum assist (75% patient effort)  -     Assistive Device (Bed Mobility) bed rails;head of bed elevated  -     Comment, (Bed Mobility) extra time and effort needed to complete  -     Row Name 10/10/22 1532          Transfers    Comment, (Transfers) cues for hand placement and sequencing. Pt performed SPT from EOB > recliner with RW and SBA, then sit <> stand from recliner with CGA.  -     Row Name 10/10/22 1532          Bed-Chair Transfer    Bed-Chair [place] (Transfers) standby assist  -     Assistive Device (Bed-Chair Transfers) walker, front-wheeled  -SJ     Row Name 10/10/22 1532          Sit-Stand Transfer    Sit-Stand [place] (Transfers) contact guard;verbal cues  -     Assistive Device (Sit-Stand Transfers) walker, front-wheeled  -SJ     Row Name 10/10/22 1532          Gait/Stairs (Locomotion)    [place] Level (Gait)  contact guard;1 person assist;verbal cues  -SJ     Assistive Device (Gait) walker, front-wheeled  -SJ     Distance in Feet (Gait) 15  -SJ     Deviations/Abnormal Patterns (Gait) bilateral deviations;jovita decreased;gait speed decreased;stride length decreased  -SJ     Bilateral Gait Deviations forward flexed posture;heel strike decreased  -SJ     Comment, (Gait/Stairs) Pt amb forward from recliner with RW x15 ft with recliner in tow. Distance limited by weakness and fatigue. VSS on RA.  -SJ           User Key  (r) = Recorded By, (t) = Taken By, (c) = Cosigned By    Initials Name Provider Type    Johanny Ortiz PT Physical Therapist               Obj/Interventions     Row Name 10/10/22 1534          Balance    Static Sitting Balance supervision  -SJ     Position, Sitting Balance unsupported;sitting edge of bed  -SJ     Dynamic Standing Balance standby assist  -SJ     Position/Device Used, Standing Balance walker, rolling  -SJ           User Key  (r) = Recorded By, (t) = Taken By, (c) = Cosigned By    Initials Name Provider Type    Johanny Ortiz PT Physical Therapist               Goals/Plan    No documentation.                Clinical Impression     Row Name 10/10/22 1535          Pain    Pain Intervention(s) Ambulation/increased activity;Repositioned  -SJ     Additional Documentation Pain Scale: FACES Pre/Post-Treatment (Group)  -     Row Name 10/10/22 1535          Pain Scale: FACES Pre/Post-Treatment    Pain: FACES Scale, Pretreatment 2-->hurts little bit  -SJ     Posttreatment Pain Rating 2-->hurts little bit  -SJ     Pain Location - Side/Orientation Right  -SJ     Pain Location - foot  -SJ     Row Name 10/10/22 1533          Plan of Care Review    Plan of Care Reviewed With patient;daughter  -     Progress improving  -SJ     Outcome Evaluation Pt t/f supine > sit with [place], needing extra time and effort to completed. Pt performed SPT from EOB > recliner with RW and SBA, then sit <> stand from  recliner with CGA.Pt amb forward from recliner with RW x15 ft with recliner in tow. Distance limited by weakness and fatigue. VSS on RA.  -     Row Name 10/10/22 1535          Vital Signs    Intra Systolic BP Rehab 132  -SJ     Intra Treatment Diastolic BP 78  -SJ     Posttreatment Heart Rate (beats/min) 72  -SJ     Post SpO2 (%) 96  -SJ     O2 Delivery Post Treatment room air  -SJ     Pre Patient Position Supine  -SJ     Post Patient Position Sitting  -     Row Name 10/10/22 1535          Positioning and Restraints    Pre-Treatment Position in bed  -SJ     Post Treatment Position chair  -SJ     In Chair notified nsg;reclined;call light within reach;encouraged to call for assist;exit alarm on;with family/caregiver;waffle cushion;heels elevated;with other staff  -           User Key  (r) = Recorded By, (t) = Taken By, (c) = Cosigned By    Initials Name Provider Type    Johanny Ortiz PT Physical Therapist               Outcome Measures     Row Name 10/10/22 1537          How much help from another person do you currently need...    Turning from your back to your side while in flat bed without using bedrails? 3  -SJ     Moving from lying on back to sitting on the side of a flat bed without bedrails? 3  -SJ     Moving to and from a bed to a chair (including a wheelchair)? 3  -SJ     Standing up from a chair using your arms (e.g., wheelchair, bedside chair)? 4  -SJ     Climbing 3-5 steps with a railing? 3  -SJ     To walk in hospital room? 3  -SJ     AM-PAC 6 Clicks Score (PT) 19  -SJ     Highest level of mobility 6 --> Walked 10 steps or more  -     Row Name 10/10/22 1537          Functional Assessment    Outcome Measure Options AM-PAC 6 Clicks Basic Mobility (PT)  -           User Key  (r) = Recorded By, (t) = Taken By, (c) = Cosigned By    Initials Name Provider Type    Johanny Ortiz PT Physical Therapist                             Physical Therapy Education     Title: PT OT SLP Therapies (In  Progress)     Topic: Physical Therapy (Done)     Point: Mobility training (Done)     Learning Progress Summary           Patient Acceptance, E, VU by  at 10/10/2022 1537    Acceptance, E,TB, VU by KW at 10/10/2022 1100    Comment: edcuated about continued benefit of wound care    Acceptance, E, VU by ML at 10/8/2022 1010   Family Acceptance, E, VU by SJ at 10/10/2022 1537                   Point: Home exercise program (Done)     Learning Progress Summary           Patient Acceptance, E, VU by SJ at 10/10/2022 1537    Acceptance, E,TB, VU by KW at 10/10/2022 1100    Comment: edcuated about continued benefit of wound care    Acceptance, E, VU by ML at 10/8/2022 1010   Family Acceptance, E, VU by SJ at 10/10/2022 1537                   Point: Body mechanics (Done)     Learning Progress Summary           Patient Acceptance, E, VU by  at 10/10/2022 1537    Acceptance, E,TB, VU by KW at 10/10/2022 1100    Comment: edcuated about continued benefit of wound care    Acceptance, E, VU by ML at 10/8/2022 1010   Family Acceptance, E, VU by SJ at 10/10/2022 1537                   Point: Precautions (Done)     Learning Progress Summary           Patient Acceptance, E, VU by  at 10/10/2022 1537    Acceptance, E,TB, VU by KW at 10/10/2022 1100    Comment: edcuated about continued benefit of wound care    Acceptance, E, VU by  at 10/8/2022 1010   Family Acceptance, E, VU by  at 10/10/2022 1537                               User Key     Initials Effective Dates Name Provider Type Discipline     06/16/21 -  Johanny Patton, PT Physical Therapist PT    ML 04/22/21 -  Janeth Cervantes Physical Therapist PT    KW 01/27/22 -  Debra Henriquez PT Physical Therapist PT              PT Recommendation and Plan     Plan of Care Reviewed With: patient, daughter  Progress: improving  Outcome Evaluation: Pt t/f supine > sit with [place], needing extra time and effort to completed. Pt performed SPT from EOB > recliner with RW and SBA,  then sit <> stand from recliner with CGA.Pt amb forward from recliner with RW x15 ft with recliner in tow. Distance limited by weakness and fatigue. VSS on RA.     Time Calculation:    PT Charges     Row Name 10/10/22 1537 10/10/22 1100          Time Calculation    Start Time 1455  -SJ 1100  -KW     PT Received On 10/10/22  - --     PT Goal Re-Cert Due Date 10/18/22  - --        Time Calculation- PT    Total Timed Code Minutes- PT 23 minute(s)  -SJ --        Timed Charges    72278 - Gait Training Minutes  15  -SJ --     74951 - PT Therapeutic Activity Minutes 8  -SJ --        Untimed Charges    98022-Zyjinfbzx debridement -- 25  -KW        Total Minutes    Timed Charges Total Minutes 23  -SJ --     Untimed Charges Total Minutes -- 25  -KW      Total Minutes 23  -SJ 25  -KW           User Key  (r) = Recorded By, (t) = Taken By, (c) = Cosigned By    Initials Name Provider Type     Johanny Patton, PT Physical Therapist    KW Debra Henriquez, SAHIL Physical Therapist              Therapy Charges for Today     Code Description Service Date Service Provider Modifiers Qty    93271041079 HC GAIT TRAINING EA 15 MIN 10/10/2022 Johanny Patton, PT GP 1    52329415649 HC PT THERAPEUTIC ACT EA 15 MIN 10/10/2022 Johanny Patton, PT GP 1          PT G-Codes  Outcome Measure Options: AM-PAC 6 Clicks Basic Mobility (PT)  AM-PAC 6 Clicks Score (PT): 19  AM-PAC 6 Clicks Score (OT): 15    Johanny Patton PT  10/10/2022

## 2022-10-10 NOTE — PROGRESS NOTES
"  [facility]  PROGRESS NOTE    Date of Admission: 10/6/2022  Date of Service: 10/10/22    Primary Care Physician: Arleen Doherty MD    Chief Complaint: follow up GI bleed on Eliquis    Subjective    Somnolent, arousable, no chest pain or shortness of breath.  Has not ambulated.    Objective   Vitals: /58 (BP Location: Left arm, Patient Position: Lying)   Pulse 62   Temp 96.5 °F (35.8 °C) (Axillary)   Resp 16   Ht 160 cm (63\")   Wt 75.1 kg (165 lb 8 oz)   SpO2 95%   BMI 29.32 kg/m²     Physical Exam:  General: No apparent distress.  Neck: no JVD.  Chest:No respiratory distress, breath sounds are normal. No wheezes,  rhonchi or rales.  Cardiovascular: Normal S1 and S2, no murmur, gallop or rub.    Extremities: No edema.    Results:  Results from last 7 days   Lab Units 10/10/22  0631 10/09/22  2336 10/09/22  1815 10/09/22  1245 10/09/22  0641 10/07/22  1840 10/07/22  0953   WBC 10*3/mm3 4.93  --   --   --  6.56  --  7.10   HEMOGLOBIN g/dL 8.8* 8.6* 9.0*   < > 9.0*  9.0*   < > 6.8*   HEMATOCRIT % 26.9* 26.2* 26.7*   < > 26.3*  26.3*   < > 20.7*   PLATELETS 10*3/mm3 229  --   --   --  242  --  250    < > = values in this interval not displayed.     Results from last 7 days   Lab Units 10/10/22  0631 10/08/22  0430 10/07/22  0953   SODIUM mmol/L 139 143 140   POTASSIUM mmol/L 3.6 4.0 3.6   CHLORIDE mmol/L 106 109* 105   CO2 mmol/L 24.0 25.0 26.0   BUN mg/dL 34* 65* 82*   CREATININE mg/dL 0.89 0.95 1.20*   GLUCOSE mg/dL 164* 57* 77      Lab Results   Component Value Date    CHOL 186 06/04/2021    TRIG 103 06/04/2021    HDL 40 06/04/2021     (H) 06/04/2021    AST 17 10/07/2022    ALT 6 10/07/2022                         Results from last 7 days   Lab Units 10/06/22  1450   TROPONIN T ng/mL 0.178*             Intake/Output Summary (Last 24 hours) at 10/10/2022 0836  Last data filed at 10/10/2022 0414  Gross per 24 hour   Intake 220 ml   Output 1280 ml   Net " -1060 ml       I personally reviewed the patient's EKG/Telemetry data and new clinical data    Current Medications:  amoxicillin-clavulanate, 1 tablet, Oral, Q24H  atorvastatin, 40 mg, Oral, Daily  bumetanide, 0.5 mg, Oral, Daily  guaiFENesin, 1,200 mg, Oral, BID  hydrALAZINE, 25 mg, Oral, Q8H  insulin lispro, 0-9 Units, Subcutaneous, TID AC  lactobacillus acidophilus, 1 capsule, Oral, Daily  metoprolol tartrate, 12.5 mg, Oral, Q12H  pantoprazole, 40 mg, Intravenous, Q12H  polyethylene glycol, 17 g, Oral, Daily  sennosides-docusate, 2 tablet, Oral, BID  sodium chloride, 10 mL, Intravenous, Q12H  vitamin B-12, 100 mcg, Oral, Daily           Assessment:  1. GI bleed on NOAC, upper endoscopy revealed multiple ulcers, NOAC held.  Has received transfusion and hemoglobin continues to improved. Okay to resume Eliquis today per GI.   2. Atrial fibrillation, currently in sinus rhythm.  Metoprolol was discontinued at previous admission due to bradycardia, Eliquis held due to significant GI bleed   3. Chronic congestive heart failure with preserved EF, ejection fraction 55% by echocardiogram July 29, 2022.   4. Nonobstructive CAD by cardiac cath June 21, 2021.  5. Hypertension with history of orthostatic hypotension.  No evidence of renal artery stenosis by renal duplex August 2022  6. Dyslipidemia, on statin therapy.  7. Uncontrolled diabetes, most recent A1c 10.    8. Chronic kidney disease, creatinine stable.    Plan:   1. The patient is maintaining sinus rhythm but remains at risk of CVA, will resume Eliquis 2.5 mg twice daily as okayed by GI.   2. Will discuss with my EP colleagues regarding watchman device ASAP.    3. She is otherwise stable from cardiac standpoint with well compensated congestive heart failure and adequate blood pressure control.  Continue current medical management.  4. Discharge plans when all agree.  Okay anytime from cardiology standpoint.  5. I will sign off and will be available to see her as  needed, please call for questions or concerns.  After discharge she can keep her previously scheduled follow-up appointment.    Zakia Vergara PA-C    I have seen and examined the patient, case was discussed with the physician extender, reviewed the above note, necessary changes were made and I agree with the final note.   Anjum Ceballos MD, FACC, [facility]

## 2022-10-10 NOTE — PROGRESS NOTES
[facility] Medicine Services  PROGRESS NOTE    Patient Name: Susan Anderson  : 1940  MRN: [id number]    Date of Admission: 10/6/2022  Primary Care Physician: Arleen Doherty MD    Subjective   Subjective     CC: Follow-up GI bleed    HPI:  In bed. Notes constipation. No f/c. No n/v. Tired.   Review of Systems   Constitutional: Positive for activity change and fatigue.   HENT: Negative.    Respiratory: Negative.    Cardiovascular: Negative.    Gastrointestinal: Negative.    Genitourinary: Negative.    Musculoskeletal: Negative.    Neurological: Negative.          Objective   Objective     Vital Signs:   Temp:  [96.4 °F (35.8 °C)-98.1 °F (36.7 °C)] 96.5 °F (35.8 °C)  Heart Rate:  [60-79] 62  Resp:  [12-17] 16  BP: (114-146)/(58-70) 140/58  Flow (L/min):  [2] 2     Physical Exam:  NAD, [place]  OP clear, MMM  Neck supple  RRR  CTAB  +BS, ND, NT, soft  No c/c/e  L foot wound dressed  No obvious rashes    Results Reviewed:  LAB RESULTS:      Lab 10/10/22  0631 10/09/22  2336 10/09/22  1815 10/09/22  1245 10/09/22  0641 10/07/22  1840 10/07/22  0953 10/06/22  1450   WBC 4.93  --   --   --  6.56  --  7.10 9.26   HEMOGLOBIN 8.8* 8.6* 9.0* 9.5* 9.0*  9.0*   < > 6.8* 7.9*   HEMATOCRIT 26.9* 26.2* 26.7* 29.0* 26.3*  26.3*   < > 20.7* 23.8*   PLATELETS 229  --   --   --  242  --  250 333   NEUTROS ABS 2.57  --   --   --   --   --  4.65 6.19   IMMATURE GRANS (ABS) 0.34*  --   --   --   --   --  0.24* 0.28*   LYMPHS ABS 1.25  --   --   --   --   --  1.39 1.82   MONOS ABS 0.59  --   --   --   --   --  0.57 0.75   EOS ABS 0.16  --   --   --   --   --  0.22 0.19   MCV 91.8  --   --   --  90.1  --  89.2 88.1    < > = values in this interval not displayed.         Lab 10/10/22  0631 10/08/22  0430 10/07/22  0953 10/06/22  1450   SODIUM 139 143 140 137   POTASSIUM 3.6 4.0 3.6 3.9   CHLORIDE 106 109* 105 98   CO2 24.0 25.0 26.0 25.0   ANION GAP 9.0 9.0 9.0 14.0   BUN 34* 65* 82* 93*   CREATININE  0.89 0.95 1.20* 1.45*   EGFR 64.4 59.6* 45.0* 35.9*   GLUCOSE 164* 57* 77 170*   CALCIUM 7.5* 8.3* 8.2* 9.1   MAGNESIUM  --   --   --  1.5*         Lab 10/07/22  0953 10/06/22  1450   TOTAL PROTEIN 4.5* 5.7*   ALBUMIN 2.50* 2.90*   GLOBULIN 2.0 2.8   ALT (SGPT) 6 7   AST (SGOT) 17 17   BILIRUBIN 0.7 0.7   ALK PHOS 145* 157*         Lab 10/06/22  1450   TROPONIN T 0.178*             Lab 10/09/22  1245 10/06/22  1718   IRON 37  --    IRON SATURATION 20  --    TIBC 189*  --    TRANSFERRIN 127*  --    ABO TYPING  --  A   RH TYPING  --  Positive   ANTIBODY SCREEN  --  Negative         Brief Urine Lab Results  (Last result in the past 365 days)      Color   Clarity   Blood   Leuk Est   Nitrite   Protein   CREAT   Urine HCG        10/06/22 1602 Yellow   Cloudy   Negative   Large (3+)   Negative   100 mg/dL (2+)                 Microbiology Results Abnormal     Procedure Component Value - Date/Time    Urine Culture - Urine, Urine, Catheter In/Out [[id number]]  (Normal) Collected: 10/06/22 1602    Lab Status: Final result Specimen: Urine, Catheter In/Out Updated: 10/1940     Urine Culture No growth          No radiology results from the last 24 hrs    Results for orders placed during the hospital encounter of 08/21/22    Adult Transthoracic Echo Complete W/ Cont if Necessary Per Protocol    Interpretation Summary  · Estimated left ventricular EF = 55%  · Mild to moderate mitral valve regurgitation is present.      I have reviewed the medications:  Scheduled Meds:amoxicillin-clavulanate, 1 tablet, Oral, Q24H  apixaban, 2.5 mg, Oral, BID  atorvastatin, 40 mg, Oral, Daily  bumetanide, 0.5 mg, Oral, Daily  clopidogrel, 75 mg, Oral, Daily  guaiFENesin, 1,200 mg, Oral, BID  hydrALAZINE, 25 mg, Oral, Q8H  insulin lispro, 0-9 Units, Subcutaneous, TID AC  lactobacillus acidophilus, 1 capsule, Oral, Daily  metoprolol tartrate, 12.5 mg, Oral, Q12H  pantoprazole, 40 mg, Intravenous, Q12H  polyethylene glycol, 17 g, Oral,  Daily  sennosides-docusate, 2 tablet, Oral, BID  sodium chloride, 10 mL, Intravenous, Q12H  vitamin B-12, 100 mcg, Oral, Daily      Continuous Infusions:   PRN Meds:.•  acetaminophen  •  bisacodyl  •  dextrose  •  dextrose  •  glucagon (human recombinant)  •  HYDROcodone-acetaminophen  •  magnesium sulfate **OR** magnesium sulfate in D5W 1g/100mL (PREMIX) **OR** magnesium sulfate  •  ondansetron  •  sodium chloride  •  sodium chloride    Assessment & Plan   Assessment & Plan     Active Hospital Problems    Diagnosis  POA   • **GIB (gastrointestinal bleeding) [K92.2]  Yes   • Paroxysmal atrial fibrillation (HCC) [I48.0]  No   • Vasovagal syncope [R55]  Yes   • Sleep apnea [G47.30]  Yes   • Anemia, chronic disease [D63.8]  Yes   • Demand ischemia (HCC) [I24.8]  Yes   • Degeneration of lumbar or lumbosacral intervertebral disc [M51.37]  Yes   • Coronary artery disease involving native coronary artery of native heart without angina pectoris [I25.10]  Yes   • Chronic combined systolic and diastolic heart failure (HCC) [I50.42]  Yes   • NICM (nonischemic cardiomyopathy) ([facility]) [I42.8]  Yes   • Dyslipidemia [E78.5]  Yes   • Essential hypertension [I10]  Yes   • Diabetes mellitus with neuropathy ([facility]) [E11.40]  Yes   • Diabetic foot ulcer ([facility]) [E11.621, L97.509]  Yes   • Stage 3a chronic kidney disease (HCC) [N18.31]  Yes   • PVD (peripheral vascular disease) ([facility]) [I73.9]  Yes      Resolved Hospital Problems   No resolved problems to display.        Brief Hospital Course to date:  Susan Anderson is a 82 y.o. female with multiple medical problems that include but not limited to CKD stage III, atrial fibrillation, hypertension, PVD/PAD type 2 diabetes, left lower extremity infection/osteomyelitis s/p mid SFA to LLE with KRISTY, ELIUD who presents to the ED with dizziness, nausea, admitted with suspected GI bleed     Symptomatic acute blood loss anemia  Suspected GI bleed  Near syncope  -CT abd/pelvis is unremarkable, CT is head  negative  -s/p PRBC  -EGD with gastric ulcers, repeat EGD in 3 months, on PPI     PAD/PVD  Non-healing Left foot ulcer/osteomyelitis  -Followed by Dr. Finney, s/p KRISTY to mid SFA 9/28  -resuming plavix today 10/10  -Continue oral antibiotics, currently on Augmentin/minocycline, followed by ID, Dr. Pete  -[facility], PT/OT following     Relatively well-controlled type 2 diabetes A1c 7.8%  -stable FSBS trend, continue SSI     Hypertension  -monitoring and stable     Paroxysmal atrial fibrillation  -Rate seems to be controlled, continue metoprolol  -Eliquis resumed     Ischemic cardiomyopathy, HFpEF  -Seems to be currently compensated  -Continues beta-blocker, statin and bumex     CKD stage III  -stable     Hypomagnesemia  -Continue to monitor and replete per protocol     Elevated troponin  -Chronic in the setting of CKD, severe PVD and ICM  -No further work-up is planned at this time    Resume eliquis and plavix and monitor CBC  Continue bowel regimen     Expected Discharge Location and Transportation: Home versus rehab  Expected Discharge Date:10/11/22     DVT prophylaxis:  Medical and mechanical DVT prophylaxis orders are present.     AM-PAC 6 Clicks Score (PT): 16 (10/08/22 1010)    CODE STATUS:   Code Status and Medical Interventions:   Ordered at: 10/06/22 2777     Medical Intervention Limits:    NO intubation (DNI)    NO cardioversion     Level Of Support Discussed With:    Patient     Code Status (Patient has no pulse and is not breathing):    No CPR (Do Not Attempt to Resuscitate)     Medical Interventions (Patient has pulse or is breathing):    Limited Support       Breezy Felder MD  10/10/22

## 2022-10-10 NOTE — PLAN OF CARE
Goal Outcome Evaluation:  Plan of Care Reviewed With: patient, daughter        Progress: improving  Outcome Evaluation: Pt t/f supine > sit with [place], needing extra time and effort to completed. Pt performed SPT from EOB > recliner with RW and SBA, then sit <> stand from recliner with CGA.Pt amb forward from recliner with RW x15 ft with recliner in tow. Distance limited by weakness and fatigue. VSS on RA.

## 2022-10-11 LAB
CYTO UR: NORMAL
DEPRECATED RDW RBC AUTO: 58.9 FL (ref 37–54)
ERYTHROCYTE [DISTWIDTH] IN BLOOD BY AUTOMATED COUNT: 17.6 % (ref 12.3–15.4)
GLUCOSE BLDC GLUCOMTR-MCNC: 212 MG/DL (ref 70–130)
GLUCOSE BLDC GLUCOMTR-MCNC: 264 MG/DL (ref 70–130)
GLUCOSE BLDC GLUCOMTR-MCNC: 369 MG/DL (ref 70–130)
HCT VFR BLD AUTO: 25 % (ref 34–46.6)
HCT VFR BLD AUTO: 25.5 % (ref 34–46.6)
HCT VFR BLD AUTO: 25.7 % (ref 34–46.6)
HGB BLD-MCNC: 8.1 G/DL (ref 12–15.9)
HGB BLD-MCNC: 8.2 G/DL (ref 12–15.9)
HGB BLD-MCNC: 8.3 G/DL (ref 12–15.9)
LAB AP CASE REPORT: NORMAL
LAB AP CLINICAL INFORMATION: NORMAL
LAB AP SPECIAL STAINS: NORMAL
MCH RBC QN AUTO: 30.5 PG (ref 26.6–33)
MCHC RBC AUTO-ENTMCNC: 31.9 G/DL (ref 31.5–35.7)
MCV RBC AUTO: 95.5 FL (ref 79–97)
PATH REPORT.FINAL DX SPEC: NORMAL
PATH REPORT.GROSS SPEC: NORMAL
PLATELET # BLD AUTO: 218 10*3/MM3 (ref 140–450)
PMV BLD AUTO: 10.8 FL (ref 6–12)
RBC # BLD AUTO: 2.69 10*6/MM3 (ref 3.77–5.28)
WBC NRBC COR # BLD: 5.53 10*3/MM3 (ref 3.4–10.8)

## 2022-10-11 PROCEDURE — A9270 NON-COVERED ITEM OR SERVICE: HCPCS | Performed by: NURSE PRACTITIONER

## 2022-10-11 PROCEDURE — 99232 SBSQ HOSP IP/OBS MODERATE 35: CPT | Performed by: HOSPITALIST

## 2022-10-11 PROCEDURE — 63710000001 PANTOPRAZOLE 40 MG TABLET DELAYED-RELEASE: Performed by: HOSPITALIST

## 2022-10-11 PROCEDURE — 85018 HEMOGLOBIN: CPT | Performed by: INTERNAL MEDICINE

## 2022-10-11 PROCEDURE — 63710000001 ACETAMINOPHEN 325 MG TABLET: Performed by: NURSE PRACTITIONER

## 2022-10-11 PROCEDURE — 85014 HEMATOCRIT: CPT | Performed by: INTERNAL MEDICINE

## 2022-10-11 PROCEDURE — 63710000001 ATORVASTATIN 40 MG TABLET: Performed by: NURSE PRACTITIONER

## 2022-10-11 PROCEDURE — A9270 NON-COVERED ITEM OR SERVICE: HCPCS | Performed by: INTERNAL MEDICINE

## 2022-10-11 PROCEDURE — 82962 GLUCOSE BLOOD TEST: CPT

## 2022-10-11 PROCEDURE — 63710000001 SENNOSIDES-DOCUSATE 8.6-50 MG TABLET: Performed by: NURSE PRACTITIONER

## 2022-10-11 PROCEDURE — 63710000001 LACTOBACILLUS ACIDOPHILUS CAPSULE: Performed by: NURSE PRACTITIONER

## 2022-10-11 PROCEDURE — 63710000001 GABAPENTIN 100 MG CAPSULE: Performed by: HOSPITALIST

## 2022-10-11 PROCEDURE — A9270 NON-COVERED ITEM OR SERVICE: HCPCS | Performed by: HOSPITALIST

## 2022-10-11 PROCEDURE — 63710000001 AMOXICILLIN-CLAVULANATE 875-125 MG TABLET: Performed by: NURSE PRACTITIONER

## 2022-10-11 PROCEDURE — 85027 COMPLETE CBC AUTOMATED: CPT | Performed by: HOSPITALIST

## 2022-10-11 PROCEDURE — 85014 HEMATOCRIT: CPT | Performed by: HOSPITALIST

## 2022-10-11 PROCEDURE — G0378 HOSPITAL OBSERVATION PER HR: HCPCS

## 2022-10-11 PROCEDURE — 97530 THERAPEUTIC ACTIVITIES: CPT | Performed by: OCCUPATIONAL THERAPIST

## 2022-10-11 PROCEDURE — 63710000001 APIXABAN 2.5 MG TABLET: Performed by: INTERNAL MEDICINE

## 2022-10-11 PROCEDURE — 63710000001 POLYETHYLENE GLYCOL 17 G PACK: Performed by: NURSE PRACTITIONER

## 2022-10-11 PROCEDURE — 63710000001 GUAIFENESIN 600 MG TABLET SUSTAINED-RELEASE 12 HOUR: Performed by: NURSE PRACTITIONER

## 2022-10-11 PROCEDURE — 63710000001 VITAMIN B-12 100 MCG TABLET: Performed by: NURSE PRACTITIONER

## 2022-10-11 PROCEDURE — 63710000001 HYDRALAZINE 25 MG TABLET: Performed by: INTERNAL MEDICINE

## 2022-10-11 PROCEDURE — 63710000001 CLOPIDOGREL 75 MG TABLET: Performed by: HOSPITALIST

## 2022-10-11 PROCEDURE — 63710000001 METOPROLOL TARTRATE 12.5 MG TABLET: Performed by: NURSE PRACTITIONER

## 2022-10-11 PROCEDURE — 85018 HEMOGLOBIN: CPT | Performed by: HOSPITALIST

## 2022-10-11 PROCEDURE — 63710000001 BUMETANIDE 1 MG TABLET: Performed by: INTERNAL MEDICINE

## 2022-10-11 PROCEDURE — 63710000001 INSULIN LISPRO (HUMAN) PER 5 UNITS: Performed by: NURSE PRACTITIONER

## 2022-10-11 PROCEDURE — 97535 SELF CARE MNGMENT TRAINING: CPT | Performed by: OCCUPATIONAL THERAPIST

## 2022-10-11 RX ORDER — PANTOPRAZOLE SODIUM 40 MG/1
40 TABLET, DELAYED RELEASE ORAL
Status: DISCONTINUED | OUTPATIENT
Start: 2022-10-11 | End: 2022-10-12 | Stop reason: HOSPADM

## 2022-10-11 RX ORDER — GABAPENTIN 100 MG/1
100 CAPSULE ORAL 3 TIMES DAILY
Status: DISCONTINUED | OUTPATIENT
Start: 2022-10-11 | End: 2022-10-12 | Stop reason: HOSPADM

## 2022-10-11 RX ADMIN — GABAPENTIN 100 MG: 100 CAPSULE ORAL at 08:26

## 2022-10-11 RX ADMIN — Medication 1 CAPSULE: at 08:26

## 2022-10-11 RX ADMIN — POLYETHYLENE GLYCOL 3350 17 G: 17 POWDER, FOR SOLUTION ORAL at 08:27

## 2022-10-11 RX ADMIN — GABAPENTIN 100 MG: 100 CAPSULE ORAL at 23:16

## 2022-10-11 RX ADMIN — HYDRALAZINE HYDROCHLORIDE 25 MG: 25 TABLET ORAL at 23:16

## 2022-10-11 RX ADMIN — Medication 10 ML: at 08:27

## 2022-10-11 RX ADMIN — APIXABAN 2.5 MG: 2.5 TABLET, FILM COATED ORAL at 23:16

## 2022-10-11 RX ADMIN — CLOPIDOGREL BISULFATE 75 MG: 75 TABLET ORAL at 08:26

## 2022-10-11 RX ADMIN — Medication 10 ML: at 23:19

## 2022-10-11 RX ADMIN — ATORVASTATIN CALCIUM 40 MG: 40 TABLET, FILM COATED ORAL at 08:26

## 2022-10-11 RX ADMIN — VITAM B12 100 MCG: 100 TAB at 08:25

## 2022-10-11 RX ADMIN — GUAIFENESIN 1200 MG: 600 TABLET, EXTENDED RELEASE ORAL at 08:26

## 2022-10-11 RX ADMIN — HYDRALAZINE HYDROCHLORIDE 25 MG: 25 TABLET ORAL at 06:30

## 2022-10-11 RX ADMIN — INSULIN LISPRO 4 UNITS: 100 INJECTION, SOLUTION INTRAVENOUS; SUBCUTANEOUS at 08:27

## 2022-10-11 RX ADMIN — GABAPENTIN 100 MG: 100 CAPSULE ORAL at 15:48

## 2022-10-11 RX ADMIN — HYDRALAZINE HYDROCHLORIDE 25 MG: 25 TABLET ORAL at 13:34

## 2022-10-11 RX ADMIN — PANTOPRAZOLE SODIUM 40 MG: 40 TABLET, DELAYED RELEASE ORAL at 08:26

## 2022-10-11 RX ADMIN — BUMETANIDE 0.5 MG: 1 TABLET ORAL at 08:26

## 2022-10-11 RX ADMIN — APIXABAN 2.5 MG: 2.5 TABLET, FILM COATED ORAL at 08:27

## 2022-10-11 RX ADMIN — INSULIN LISPRO 8 UNITS: 100 INJECTION, SOLUTION INTRAVENOUS; SUBCUTANEOUS at 17:02

## 2022-10-11 RX ADMIN — GUAIFENESIN 1200 MG: 600 TABLET, EXTENDED RELEASE ORAL at 23:16

## 2022-10-11 RX ADMIN — Medication 12.5 MG: at 23:16

## 2022-10-11 RX ADMIN — ACETAMINOPHEN 650 MG: 325 TABLET ORAL at 02:24

## 2022-10-11 RX ADMIN — SENNOSIDES AND DOCUSATE SODIUM 2 TABLET: 50; 8.6 TABLET ORAL at 08:27

## 2022-10-11 RX ADMIN — Medication 12.5 MG: at 08:26

## 2022-10-11 RX ADMIN — PANTOPRAZOLE SODIUM 40 MG: 40 TABLET, DELAYED RELEASE ORAL at 17:02

## 2022-10-11 RX ADMIN — INSULIN LISPRO 6 UNITS: 100 INJECTION, SOLUTION INTRAVENOUS; SUBCUTANEOUS at 11:37

## 2022-10-11 RX ADMIN — AMOXICILLIN AND CLAVULANATE POTASSIUM 1 TABLET: 875; 125 TABLET, FILM COATED ORAL at 23:16

## 2022-10-11 NOTE — PLAN OF CARE
Problem: Adult Inpatient Plan of Care  Goal: Plan of Care Review  Recent Flowsheet Documentation  Taken 10/11/2022 0928 by Nancie Dhillon OT  Progress: improving  Plan of Care Reviewed With: patient  Outcome Evaluation: Pt. requiring increased assist to move STS from commode & sitting on BSC @sink side to complete grooming tasks due to fatigue and fear of falling. Pt. donned sock with SUP sitting EOB. Pt. requiring additional time for rest & recovery between tasks. Pt. reports increased pain in foot last night & unable to sleep. Will cont to progress pt as able per POC. Recommend home with 24/7 care & HHOT upon d/c.   Goal Outcome Evaluation:  Plan of Care Reviewed With: patient        Progress: improving  Outcome Evaluation: Pt. requiring increased assist to move STS from commode & sitting on BSC @sink side to complete grooming tasks due to fatigue and fear of falling. Pt. donned sock with SUP sitting EOB. Pt. requiring additional time for rest & recovery between tasks. Pt. reports increased pain in foot last night & unable to sleep. Will cont to progress pt as able per POC. Recommend home with 24/7 care & HHOT upon d/c.

## 2022-10-11 NOTE — PROGRESS NOTES
[facility] Medicine Services  PROGRESS NOTE    Patient Name: Susan Anderson  : 1940  MRN: [id number]    Date of Admission: 10/6/2022  Primary Care Physician: Arleen Dohetry MD    Subjective   Subjective     CC: Follow-up GI bleed    HPI:  In bed. Constipation resolved, disimpacted by nursing. Notes L ankle pain, but able to ambulate. NO f/c. No dyspnea.   Review of Systems   Constitutional: Positive for activity change and fatigue.   HENT: Negative.    Respiratory: Negative.    Cardiovascular: Negative.    Gastrointestinal: Negative.    Genitourinary: Negative.    Musculoskeletal: Negative.    Neurological: Negative.          Objective   Objective     Vital Signs:   Temp:  [95 °F (35 °C)-98.1 °F (36.7 °C)] 96.5 °F (35.8 °C)  Heart Rate:  [60-73] 71  Resp:  [16-18] 16  BP: (127-143)/() 130/54  Flow (L/min):  [2] 2     Physical Exam:  NAD, [place]  OP clear, MMM  Neck supple  RRR  CTAB  +BS, ND, NT, soft  No c/c/e  L foot wound dressed, removed dressing and wound edges without redness. No ankle erythema or warmth, full ROM intact, no joint effusion  No obvious rashes    Results Reviewed:  LAB RESULTS:      Lab 10/11/22  0517 10/10/22  1814 10/10/22  1131 10/10/22  0631 10/09/22  2336 10/09/22  1245 10/09/22  0641 10/07/22  1840 10/07/22  0953 10/06/22  1450   WBC 5.53  --   --  4.93  --   --  6.56  --  7.10 9.26   HEMOGLOBIN 8.2*  8.1* 9.4* 9.9* 8.8* 8.6*   < > 9.0*  9.0*   < > 6.8* 7.9*   HEMATOCRIT 25.7*  25.5* 27.5* 29.3* 26.9* 26.2*   < > 26.3*  26.3*   < > 20.7* 23.8*   PLATELETS 218  --   --  229  --   --  242  --  250 333   NEUTROS ABS  --   --   --  2.57  --   --   --   --  4.65 6.19   IMMATURE GRANS (ABS)  --   --   --  0.34*  --   --   --   --  0.24* 0.28*   LYMPHS ABS  --   --   --  1.25  --   --   --   --  1.39 1.82   MONOS ABS  --   --   --  0.59  --   --   --   --  0.57 0.75   EOS ABS  --   --   --  0.16  --   --   --   --  0.22 0.19   MCV 95.5  --   --   91.8  --   --  90.1  --  89.2 88.1    < > = values in this interval not displayed.         Lab 10/10/22  0631 10/08/22  0430 10/07/22  0953 10/06/22  1450   SODIUM 139 143 140 137   POTASSIUM 3.6 4.0 3.6 3.9   CHLORIDE 106 109* 105 98   CO2 24.0 25.0 26.0 25.0   ANION GAP 9.0 9.0 9.0 14.0   BUN 34* 65* 82* 93*   CREATININE 0.89 0.95 1.20* 1.45*   EGFR 64.4 59.6* 45.0* 35.9*   GLUCOSE 164* 57* 77 170*   CALCIUM 7.5* 8.3* 8.2* 9.1   MAGNESIUM  --   --   --  1.5*         Lab 10/07/22  0953 10/06/22  1450   TOTAL PROTEIN 4.5* 5.7*   ALBUMIN 2.50* 2.90*   GLOBULIN 2.0 2.8   ALT (SGPT) 6 7   AST (SGOT) 17 17   BILIRUBIN 0.7 0.7   ALK PHOS 145* 157*         Lab 10/06/22  1450   TROPONIN T 0.178*             Lab 10/09/22  1245 10/06/22  1718   IRON 37  --    IRON SATURATION 20  --    TIBC 189*  --    TRANSFERRIN 127*  --    ABO TYPING  --  A   RH TYPING  --  Positive   ANTIBODY SCREEN  --  Negative         Brief Urine Lab Results  (Last result in the past 365 days)      Color   Clarity   Blood   Leuk Est   Nitrite   Protein   CREAT   Urine HCG        10/06/22 1602 Yellow   Cloudy   Negative   Large (3+)   Negative   100 mg/dL (2+)                 Microbiology Results Abnormal     Procedure Component Value - Date/Time    Urine Culture - Urine, Urine, Catheter In/Out [[id number]]  (Normal) Collected: 10/06/22 1602    Lab Status: Final result Specimen: Urine, Catheter In/Out Updated: 10/1940     Urine Culture No growth          No radiology results from the last 24 hrs    Results for orders placed during the hospital encounter of 08/21/22    Adult Transthoracic Echo Complete W/ Cont if Necessary Per Protocol    Interpretation Summary  · Estimated left ventricular EF = 55%  · Mild to moderate mitral valve regurgitation is present.      I have reviewed the medications:  Scheduled Meds:amoxicillin-clavulanate, 1 tablet, Oral, Q24H  apixaban, 2.5 mg, Oral, BID  atorvastatin, 40 mg, Oral, Daily  bumetanide, 0.5 mg, Oral,  Daily  clopidogrel, 75 mg, Oral, Daily  gabapentin, 100 mg, Oral, TID  guaiFENesin, 1,200 mg, Oral, BID  hydrALAZINE, 25 mg, Oral, Q8H  insulin lispro, 0-9 Units, Subcutaneous, TID AC  lactobacillus acidophilus, 1 capsule, Oral, Daily  metoprolol tartrate, 12.5 mg, Oral, Q12H  pantoprazole, 40 mg, Oral, BID AC  polyethylene glycol, 17 g, Oral, Daily  sennosides-docusate, 2 tablet, Oral, BID  sodium chloride, 10 mL, Intravenous, Q12H  vitamin B-12, 100 mcg, Oral, Daily      Continuous Infusions:   PRN Meds:.•  acetaminophen  •  bisacodyl  •  dextrose  •  dextrose  •  glucagon (human recombinant)  •  HYDROcodone-acetaminophen  •  magnesium sulfate **OR** magnesium sulfate in D5W 1g/100mL (PREMIX) **OR** magnesium sulfate  •  ondansetron  •  sodium chloride  •  sodium chloride    Assessment & Plan   Assessment & Plan     Active Hospital Problems    Diagnosis  POA   • **GIB (gastrointestinal bleeding) [K92.2]  Yes   • Paroxysmal atrial fibrillation (HCC) [I48.0]  No   • Vasovagal syncope [R55]  Yes   • Sleep apnea [G47.30]  Yes   • Anemia, chronic disease [D63.8]  Yes   • Demand ischemia (HCC) [I24.8]  Yes   • Degeneration of lumbar or lumbosacral intervertebral disc [M51.37]  Yes   • Coronary artery disease involving native coronary artery of native heart without angina pectoris [I25.10]  Yes   • Chronic combined systolic and diastolic heart failure (HCC) [I50.42]  Yes   • NICM (nonischemic cardiomyopathy) ([facility]) [I42.8]  Yes   • Dyslipidemia [E78.5]  Yes   • Essential hypertension [I10]  Yes   • Diabetes mellitus with neuropathy ([facility]) [E11.40]  Yes   • Diabetic foot ulcer ([facility]) [E11.621, L97.509]  Yes   • Stage 3a chronic kidney disease ([facility]) [N18.31]  Yes   • PVD (peripheral vascular disease) ([facility]) [I73.9]  Yes      Resolved Hospital Problems   No resolved problems to display.        Brief Hospital Course to date:  Susan Anderson is a 82 y.o. female with multiple medical problems that include but not limited to CKD  stage III, atrial fibrillation, hypertension, PVD/PAD type 2 diabetes, left lower extremity infection/osteomyelitis s/p mid SFA to LLE with KRISTY, ELIUD who presents to the ED with dizziness, nausea, admitted with suspected GI bleed     Symptomatic acute blood loss anemia  Suspected GI bleed  Near syncope  -CT abd/pelvis is unremarkable, CT is head negative  -s/p PRBC  -EGD with gastric ulcers, repeat EGD in 3 months, on PPI, changed to PO     PAD/PVD  Non-healing Left foot ulcer/osteomyelitis  -Followed by Dr. Finney, s/p KRISTY to mid SFA 9/28  -resumed plavix today 10/10  -Continue oral antibiotics, currently on Augmentin/minocycline, followed by ID, Dr. Pete  -[facility], PT/OT following    L ankle pain  -d/w vascular surgery and ID, to assess and follow up     Relatively well-controlled type 2 diabetes A1c 7.8%  -stable FSBS trend, continue SSI     Hypertension  -monitoring and stable     Paroxysmal atrial fibrillation  -Rate seems to be controlled, continue metoprolol  -Eliquis resumed    Constipation  -s/p disimpaction, with some blood but has reportedly resolved per patient     Ischemic cardiomyopathy, HFpEF  -Seems to be currently compensated  -Continues beta-blocker, statin and bumex     CKD stage III  -stable     Hypomagnesemia  -Continue to monitor and replete per protocol     Elevated troponin  -Chronic in the setting of CKD, severe PVD and ICM  -No further work-up is planned at this time    Resumed eliquis and plavix and monitoring CBC, slight downward trend, but stablizing  Continue bowel regimen, improved     Expected Discharge Location and Transportation: Home versus rehab  Expected Discharge Date:10/11/22     DVT prophylaxis:  Medical and mechanical DVT prophylaxis orders are present.     AM-PAC 6 Clicks Score (PT): 19 (10/10/22 9688)    CODE STATUS:   Code Status and Medical Interventions:   Ordered at: 10/06/22 6988     Medical Intervention Limits:    NO intubation (DNI)    NO cardioversion     Level Of  Support Discussed With:    Patient     Code Status (Patient has no pulse and is not breathing):    No CPR (Do Not Attempt to Resuscitate)     Medical Interventions (Patient has pulse or is breathing):    Limited Support       Breezy Felder MD  10/11/22

## 2022-10-11 NOTE — PROGRESS NOTES
INFECTIOUS DISEASE PROGRESS NOTE    Susan Anderson  1940  [id number]    Date of Consult: 10/7/2022    Admission Date: 10/6/2022      Requesting Provider: Jason Garcia DO  Evaluating Physician: Stef Pete MD    Reason for Consultation: Bilateral foot infections    History of present illness:    10/7/2022: Patient is a 82 y.o. female with h/o type 2 diabetes mellitus, systolic/diastolic congestive heart failure, stage II-3 chronic kidney disease, chronic right leg lymphedema, afib/Eliquis, coronary artery disease, gout, asthma, peripheral vascular disease, MSSA right hallux osteomyelitis requiring transmetatarsal amputation, cervical cancer, and a recent left dorsal foot ulcer with cellulitis who we were asked to see for reassessment of bilateral foot infections in setting of GI bleed.  She has been on prolonged oral Augmentin for residual MSSA foot infection after the infection extended over into the second metatarsal phalangeal region.  This wound has gradually improved and is now healed but she remains at high risk for persistent infection/relapse.  She developed a distal left dorsal foot ulcer earlier in 9/2022 and was placed on doxycycline along with silver dressing therapy at her nursing facility. She was hospitalized at [facility] from 9/21-9/30/22 during which she underwent left SFA angioplasty and stent placement. She was discharged on oral Augmentin and oral minocycline.  Her left dorsal foot wound look significantly better this past week in the office.      She returned to [facility] ED on 10/6 for a near syncopal event.  She was doing well over the past week.  She recently restarted Plavix.  On 10/5, she became weak and fatigued.  She has some nausea, but no fever or chills.  On 10/6, the patient went to bathroom as she felt she need to have a BM and had a near syncopal event.  On arrival, the patient is afebrile and hemodynamically stable.  Her admitting labs were WBC 9300 with 67% neutrophils, Hgb 7.9,  creatinine 1.45 (baseline around 1.2), and UA WBC TNTC with large LE/negative nitrite.  A urine culture is pending. She was transfused with 1U pRBC.  A CXR, CT scan of head, and CT scan of a/p all showed no acute findings.  She was continued on Augmentin and minocycline.     10/8/2022: She feels somewhat better today.  She has decreased dyspnea.  She denies increased left foot pain.  She denies nausea, vomiting, and abdominal pain.  She denies melena and hematochezia.  She has remained afebrile.  Her hemoglobin today is 9.3.    10/9/22:  She has remained afebrile overnight.Hemoglobin was 9.4 early this morning.  She remains confused.  She complains of severe fatigue.  She denies melena and hematochezia.    10/10/22: White blood cell count is 4.9 and her hemoglobin is 8.8.  Her creatinine is 0.89.  She has remained afebrile.  She denies increase dyspnea.     10/11/22: She has remained afebrile. Hemoglobin today is 8.1. She complains of left foot pain.  She is less confused today.  She denies hematochezia and melena.    Past Medical History:   Diagnosis Date   • Arthritis    • Asthma 6/4 - double pneumonia    Currently on inhaler and nebulizer   • Atrial fibrillation (HCC) 8/21/2022   • Cancer (HCC)     cervical cancer, skin cancer   • CHF (congestive heart failure) (HCC) June 4, 2021   • Chronic kidney disease Related to diabetes   • Coronary artery disease 6/4/2021 DX for hear failure   • Diabetes mellitus (HCC) 30 years    Seeing Dr. Lam 1st time Aug 19   • Gout    • Hx of colonoscopy    • Hyperlipidemia Reference current labs x 2-3yrs approx   • Hypertension 30 years   • Migraine    • Mitral valve disease    • Mitral valve disease    • Osteomyelitis (HCC)    • Peripheral neuropathy    • Sleep apnea    • Type 2 diabetes mellitus (HCC)     30 years       Past Surgical History:   Procedure Laterality Date   • ABDOMINAL HYSTERECTOMY W/SALPINGECTOMY     • AMPUTATION  Right great toe, 1st 1/3 metatarsal -[place] 4/14/21    • AORTAGRAM N/A 4/9/2021    Procedure: AORTAGRAM WITH OR WITHOUT RUNOFFS, WITH Co2;  Surgeon: Trey Forrest MD;  Location:  [facility];  Service: Vascular;  Laterality: N/A;   • AORTAGRAM N/A 9/28/2022    Procedure: CO2 ANGIOGRAM, LEFT SFA ANGIOPLASTY WITH DRUG ELUTING BALLOON, LEFT SFA STENT PLACEMENT ;  Surgeon: Trey Forrest MD;  Location:  [facility];  Service: Vascular;  Laterality: N/A;  FLUORO: 13.12  DOSE 162mGy  CONTRAST: Isovue 350: 15ml   • APPENDECTOMY     • BRAIN TUMOR EXCISION      laser surgery    • CARDIAC CATHETERIZATION N/A 6/21/2021    Procedure: LEFT HEART CATH;  Surgeon: Anjum Ceballos MD;  Location:  [facility]IVE [facility];  Service: Cardiology;  Laterality: N/A;   • CATARACT EXTRACTION W/ INTRAOCULAR LENS  IMPLANT, BILATERAL     • CHOLECYSTECTOMY     • ENDOSCOPY N/A 10/7/2022    Procedure: ESOPHAGOGASTRODUODENOSCOPY;  Surgeon: Stef Veras MD;  Location:  [facility];  Service: Gastroenterology;  Laterality: N/A;   • EYE SURGERY     • HYSTERECTOMY     • TOE SURGERY     • TRANS METATARSAL AMPUTATION Right 4/14/2021    Procedure: AMPUTATION TRANS METATARSAL RIGHT GREAT TOE;  Surgeon: Valdez Finney MD;  Location: [facility] OR;  Service: Vascular;  Laterality: Right;       Family History   Problem Relation Age of Onset   • Diabetes Mother         Type II   • Heart disease Father    • Heart attack Father    • Coronary artery disease Father    • Diabetes Father         Type II   • Diabetes Sister    • Diabetes Maternal Grandmother    • Diabetes Maternal Grandfather    • Diabetes Paternal Grandmother    • Diabetes Paternal Grandfather        Social History     Socioeconomic History   • Marital status:    • Number of children: 1   Tobacco Use   • Smoking status: Never   • Smokeless tobacco: Never   Vaping Use   • Vaping Use: Never used   Substance and Sexual Activity   • Alcohol use: Yes     Comment: Social drinking when not recovering from hospitalization   • Drug  use: Never   • Sexual activity: Not Currently     Birth control/protection: None       Allergies   Allergen Reactions   • Baclofen Other (See Comments) and Hallucinations     PSYCHOSIS-POA REFUSES ADMINISTRATION OF THIS MED.   • Cephalexin Nausea Only   • Erythromycin Base Nausea Only   • Melatonin Other (See Comments)     Nightmares   • Oxycodone Nausea Only   • Sulfa Antibiotics Nausea Only         Medication:    Current Facility-Administered Medications:   •  acetaminophen (TYLENOL) tablet 650 mg, 650 mg, Oral, Q6H PRN, Linda Lakhani APRN, 650 mg at 10/11/22 0224  •  amoxicillin-clavulanate (AUGMENTIN) 875-125 MG per tablet 1 tablet, 1 tablet, Oral, Q24H, Linda Lakhani APRN, 1 tablet at 10/10/22 2017  •  apixaban (ELIQUIS) tablet 2.5 mg, 2.5 mg, Oral, BID, Anjum Ceballos MD, 2.5 mg at 10/10/22 2017  •  atorvastatin (LIPITOR) tablet 40 mg, 40 mg, Oral, Daily, Linda Lakhani APRN, 40 mg at 10/10/22 0820  •  bisacodyl (DULCOLAX) suppository 10 mg, 10 mg, Rectal, Daily PRN, Linda Lakhani APRN  •  bumetanide (BUMEX) tablet 0.5 mg, 0.5 mg, Oral, Daily, Fortino Constantino MD, 0.5 mg at 10/10/22 0819  •  clopidogrel (PLAVIX) tablet 75 mg, 75 mg, Oral, Daily, Breezy Felder MD, 75 mg at 10/10/22 1048  •  dextrose (D50W) (25 g/50 mL) IV injection 25 g, 25 g, Intravenous, Q15 Min PRN, Linda Lakhani, APRN  •  dextrose (GLUTOSE) oral gel 15 g, 15 g, Oral, Q15 Min PRN, Linda Lakhani APRTED  •  glucagon (human recombinant) (GLUCAGEN DIAGNOSTIC) injection 1 mg, 1 mg, Intramuscular, Q15 Min PRN, Linda Lakhani, APRN  •  guaiFENesin (MUCINEX) 12 hr tablet 1,200 mg, 1,200 mg, Oral, BID, Lakahni, Linda R, APRN, 1,200 mg at 10/10/22 2017  •  hydrALAZINE (APRESOLINE) tablet 25 mg, 25 mg, Oral, Q8H, Fortino Constantino MD, 25 mg at 10/11/22 0630  •  HYDROcodone-acetaminophen (NORCO) 5-325 MG per tablet 1 tablet, 1 tablet, Oral, Q6H PRN, Jessica Darnell, INDIGO, 1 tablet at 10/08/22 1344  •  Insulin Lispro  (humaLOG) injection 0-9 Units, 0-9 Units, Subcutaneous, TID AC, Linda Lakhani APRN, 6 Units at 10/10/22 1738  •  lactobacillus acidophilus (RISAQUAD) capsule 1 capsule, 1 capsule, Oral, Daily, Linda Lakhani APRN, 1 capsule at 10/10/22 0820  •  magnesium sulfate 4 gram infusion - Mg less than or equal to 1mg/dL, 4 g, Intravenous, PRN **OR** magnesium sulfate 3 gram infusion (1gm x 3) - Mg 1.1 - 1.5 mg/dL, 1 g, Intravenous, PRN **OR** Magnesium Sulfate 2 gram infusion- Mg 1.6 - 1.9 mg/dL, 2 g, Intravenous, PRN, Linda Lakhani, APRN  •  metoprolol tartrate (LOPRESSOR) half tablet 12.5 mg, 12.5 mg, Oral, Q12H, Linda Lakhani APRN, 12.5 mg at 10/10/22 2017  •  ondansetron (ZOFRAN) injection 4 mg, 4 mg, Intravenous, Q6H PRN, Linda Lakhani APRN, 4 mg at 10/09/22 2106  •  pantoprazole (PROTONIX) injection 40 mg, 40 mg, Intravenous, Q12H, Linda Lakhani APRN, 40 mg at 10/10/22 2017  •  polyethylene glycol (MIRALAX) packet 17 g, 17 g, Oral, Daily, Denny Zelaya APRN, 17 g at 10/10/22 0819  •  sennosides-docusate (PERICOLACE) 8.6-50 MG per tablet 2 tablet, 2 tablet, Oral, BID, Linda Lakhani APRN, 2 tablet at 10/10/22 2017  •  sodium chloride 0.9 % flush 10 mL, 10 mL, Intravenous, PRN, Jah Hooper MD  •  sodium chloride 0.9 % flush 10 mL, 10 mL, Intravenous, Q12H, Linda Lakhani APRN, 10 mL at 10/10/22 2018  •  sodium chloride 0.9 % flush 10 mL, 10 mL, Intravenous, PRN, Linda Lakhani APRN  •  vitamin B-12 (CYANOCOBALAMIN) tablet 100 mcg, 100 mcg, Oral, Daily, Linda Lakhani APRN, 100 mcg at 10/10/22 0821    Antibiotics:  Anti-Infectives (From admission, onward)    Ordered     Dose/Rate Route Frequency Start Stop    10/06/22 2058  amoxicillin-clavulanate (AUGMENTIN) 875-125 MG per tablet 1 tablet        Ordering Provider: Linda Lakhani APRN    1 tablet Oral Every 24 Hours 10/06/22 2200 10/28/22 2159            Review of Systems:  See HPI      Physical Exam:   Vital  Signs  Temp (24hrs), Av °F (36.1 °C), Min:95 °F (35 °C), Max:98.1 °F (36.7 °C)    Temp  Min: 95 °F (35 °C)  Max: 98.1 °F (36.7 °C)  BP  Min: 127/56  Max: 143/63  Pulse  Min: 60  Max: 73  Resp  Min: 16  Max: 18  SpO2  Min: 95 %  Max: 99 %    GENERAL: Awake, appears confused, in no acute distress.   HEENT: Normocephalic, atraumatic.  PERRL. EOMI. No conjunctival injection. No icterus. Oropharynx clear without evidence of thrush or exudate.  NECK: Supple   HEART: RRR; No murmur,  LUNGS: Clear to auscultation bilaterally without wheezing, rales, rhonchi. Normal respiratory effort. Nonlabored.  ABDOMEN: Soft, nontender, nondistended. . No rebound or guarding. NO mass or HSM.  EXT:  No cyanosis, clubbing or edema. No cord.  :  Without Bui catheter.  MSK:  FROM, no joint effusions.   SKIN: Warm and dry without cutaneous eruptions on Inspection/palpation.  Left dorsal foot with silver dressing. This wound appears substantially improved with increased granulation tissue and some reepithelialization along the wound margins. This remains improved today.  NEURO: Awake, confused, not oriented.  PSYCHIATRIC: Cooperative with PE    Laboratory Data    Results from last 7 days   Lab Units 10/11/22  0517 10/10/22  1814 10/10/22  1131 10/10/22  0631 10/09/22  1245 10/09/22  0641 10/07/22  1840 10/07/22  0953   WBC 10*3/mm3  --   --   --  4.93  --  6.56  --  7.10   HEMOGLOBIN g/dL 8.1* 9.4* 9.9* 8.8*   < > 9.0*  9.0*   < > 6.8*   HEMATOCRIT % 25.5* 27.5* 29.3* 26.9*   < > 26.3*  26.3*   < > 20.7*   PLATELETS 10*3/mm3  --   --   --  229  --  242  --  250    < > = values in this interval not displayed.     Results from last 7 days   Lab Units 10/10/22  0631   SODIUM mmol/L 139   POTASSIUM mmol/L 3.6   CHLORIDE mmol/L 106   CO2 mmol/L 24.0   BUN mg/dL 34*   CREATININE mg/dL 0.89   GLUCOSE mg/dL 164*   CALCIUM mg/dL 7.5*     Results from last 7 days   Lab Units 10/07/22  0953   ALK PHOS U/L 145*   BILIRUBIN mg/dL 0.7   ALT (SGPT)  U/L 6   AST (SGOT) U/L 17                         Estimated Creatinine Clearance: 46.5 mL/min (by C-G formula based on SCr of 0.89 mg/dL).      Microbiology:  Urine cx NGSF          Radiology:  Imaging Results (Last 72 Hours)     ** No results found for the last 72 hours. **            Impression:   1. Left dorsal foot ulcer/wound infection- Wound is now dramatically better after vascular intervention.  She underwent vascular intervention on 9/28. Her left dorsal foot ulcer appears significantly improved.  This has continued to improve.   2. MSSA right foot osteomyelitis- she had an extensive, limb threatening right foot infection which finally healed after multiple surgical interventions and prolonged intravenous followed by oral antibiotic therapy.  I have considered her a very high risk for relapse of her MSSA right foot osteomyelitis and I have left her on chronic oral antibiotic suppression.  3. Acute GI bleed.  NEW  4. Pyuria, possible UTI vs colonization.   5. Anemia secondary to GI bleed. 1U pRBC transfusion 10/6.  6. Near syncopal event  7. Acute renal insufficiency. ATN vs dehydration.   8. History of right knee hematoma-this was quite extensive and required hospitalization in 8/22.  This is now resolved.  9. Type 2 diabetes mellitus-this increases her risk for recurrent infections  10. Peripheral vascular disease-she has arterial peripheral vascular disease and also venous stasis disease.  11. Peripheral neuropathy  12. Cognitive impairment- she has had intermittent cognitive impairment that has at times appear to be medication related  13. Stage II-3 chronic kidney disease  14. Systolic/diastolic congestive heart failure  15. Atrial fibrillation/Eliquis  16.  Keflex (nausea), erm (nausea), Sulfa (nausea) intolerances.    PLAN/RECOMMENDATIONS:   1. Continue Augmentin 875 mg PO daily  2. Continue wound care        Stef Pete MD  10/11/2022  07:04 EDT

## 2022-10-11 NOTE — PLAN OF CARE
Goal Outcome Evaluation:           Progress: improving  Outcome Evaluation: VSS, RA, NSR on monitor.  Plan was for patient to be discharged today however there was a drop in her H&H after restarting anticoagulation yesterday.  Will trend H&H q12h and if there is no decline the plan is for the patient to discharge on 10/12/22.  No complaints this shift except for pain in her feet which has been addressed with a new order for Neurontin.  Patient appears more comfortable at this time.  Will continue to monitor and continue with GOC

## 2022-10-11 NOTE — THERAPY TREATMENT NOTE
Patient Name: Susan Anderson  : 1940    MRN: [id number]                              Today's Date: 10/11/2022       Admit Date: 10/6/2022    Visit Dx:     ICD-10-CM ICD-9-CM   1. Syncope, unspecified syncope type  R55 780.2   2. Anemia, unspecified type  D64.9 285.9   3. LACEY (acute kidney injury) ([facility])  N17.9 584.9   4. Elevated troponin  R77.8 790.6   5. Anticoagulated  Z79.01 V58.61   6. Gastrointestinal hemorrhage, unspecified gastrointestinal hemorrhage type  K92.2 578.9   7. Gastrointestinal hemorrhage associated with gastric ulcer  K25.4 531.40     Patient Active Problem List   Diagnosis   • Diabetic foot ulcer ([facility])   • PVD (peripheral vascular disease) ([facility])   • Osteomyelitis ([facility])   • Diabetes mellitus with neuropathy ([facility])   • Essential hypertension   • Stage 3a chronic kidney disease ([facility])   • Pyogenic inflammation of bone ([facility])   • Hyperglycemia   • Pneumonia due to infectious organism   • Mitral valve disease   • NICM (nonischemic cardiomyopathy) ([facility])   • Coronary artery disease involving native coronary artery of native heart without angina pectoris   • Dyslipidemia   • Chronic combined systolic and diastolic heart failure ([facility])   • Lumbar stenosis with neurogenic claudication   • Spondylosis of lumbar region without myelopathy or radiculopathy   • Spondylolisthesis, lumbar region   • Degeneration of lumbar or lumbosacral intervertebral disc   • Gait disturbance   • Physical deconditioning   • Sarcopenia   • Weakness   • Anemia, chronic disease   • Fall   • Dizziness   • Demand ischemia ([facility])   • Effusion of right knee   • Right knee pain   • Pressure injury of skin of sacral region   • Sleep apnea   • GIB (gastrointestinal bleeding)   • Vasovagal syncope   • Hypomagnesemia   • Syncope, unspecified syncope type   • Paroxysmal atrial fibrillation ([facility])     Past Medical History:   Diagnosis Date   • Arthritis    • Asthma / - double pneumonia    Currently on inhaler and nebulizer   • Atrial  fibrillation (HCC) 8/21/2022   • Cancer (HCC)     cervical cancer, skin cancer   • CHF (congestive heart failure) (HCC) June 4, 2021   • Chronic kidney disease Related to diabetes   • Coronary artery disease 6/4/2021 DX for hear failure   • Diabetes mellitus (HCC) 30 years    Seeing Dr. Lam 1st time Aug 19   • Gout    • Hx of colonoscopy    • Hyperlipidemia Reference current labs x 2-3yrs approx   • Hypertension 30 years   • Migraine    • Mitral valve disease    • Mitral valve disease    • Osteomyelitis (HCC)    • Peripheral neuropathy    • Sleep apnea    • Type 2 diabetes mellitus (HCC)     30 years     Past Surgical History:   Procedure Laterality Date   • ABDOMINAL HYSTERECTOMY W/SALPINGECTOMY     • AMPUTATION  Right great toe, 1st 1/3 metatarsal -[place] 4/14/21   • AORTAGRAM N/A 4/9/2021    Procedure: AORTAGRAM WITH OR WITHOUT RUNOFFS, WITH Co2;  Surgeon: Trey Forrest MD;  Location:  [facility];  Service: Vascular;  Laterality: N/A;   • AORTAGRAM N/A 9/28/2022    Procedure: CO2 ANGIOGRAM, LEFT SFA ANGIOPLASTY WITH DRUG ELUTING BALLOON, LEFT SFA STENT PLACEMENT ;  Surgeon: Trey Forrest MD;  Location:  [facility];  Service: Vascular;  Laterality: N/A;  FLUORO: 13.12  DOSE 162mGy  CONTRAST: Isovue 350: 15ml   • APPENDECTOMY     • BRAIN TUMOR EXCISION      laser surgery    • CARDIAC CATHETERIZATION N/A 6/21/2021    Procedure: LEFT HEART CATH;  Surgeon: Anjum Ceballos MD;  Location:  [facility];  Service: Cardiology;  Laterality: N/A;   • CATARACT EXTRACTION W/ INTRAOCULAR LENS  IMPLANT, BILATERAL     • CHOLECYSTECTOMY     • ENDOSCOPY N/A 10/7/2022    Procedure: ESOPHAGOGASTRODUODENOSCOPY;  Surgeon: Stef Veras MD;  Location:  [facility];  Service: Gastroenterology;  Laterality: N/A;   • EYE SURGERY     • HYSTERECTOMY     • TOE SURGERY     • TRANS METATARSAL AMPUTATION Right 4/14/2021    Procedure: AMPUTATION TRANS METATARSAL RIGHT GREAT TOE;  Surgeon: Valdez Finney MD;   Location: [facility];  Service: Vascular;  Laterality: Right;      General Information     Row Name 10/11/22 0837          OT Time and Intention    Document Type therapy note (daily note)  -SD     Mode of Treatment occupational therapy  -SD     Row Name 10/11/22 0837          General Information    Patient Profile Reviewed yes  -SD     Existing Precautions/Restrictions fall  -SD     Row Name 10/11/22 0837          Cognition    Orientation Status (Cognition) oriented x 4  -SD     Row Name 10/11/22 0837          Safety Issues, Functional Mobility    Safety Issues Affecting Function (Mobility) insight into deficits/self-awareness;safety precaution awareness;safety precautions follow-through/compliance;sequencing abilities  -SD     Impairments Affecting Function (Mobility) balance;strength;endurance/activity tolerance  -SD           User Key  (r) = Recorded By, (t) = Taken By, (c) = Cosigned By    Initials Name Provider Type    SD Nancie Dhillon, OT Occupational Therapist                 Mobility/ADL's     Row Name 10/11/22 0925          Transfers    Transfers sit-stand transfer;stand-sit transfer;bed-chair transfer;toilet transfer  -SD     Row Name 10/11/22 0925          Bed-Chair Transfer    Bed-Chair [place] (Transfers) contact guard;1 person assist;verbal cues  -SD     Assistive Device (Bed-Chair Transfers) walker, front-wheeled  -SD     Row Name 10/11/22 0925          Sit-Stand Transfer    Sit-Stand [place] (Transfers) 1 person assist;verbal cues;minimum assist (75% patient effort)  -SD     Assistive Device (Sit-Stand Transfers) walker, front-wheeled  -SD     Comment, (Sit-Stand Transfer) CGA for STS from EOB, but min A for STS from commode  -SD     Row Name 10/11/22 0925          Stand-Sit Transfer    Stand-Sit [place] (Transfers) contact guard;verbal cues;1 person assist  -SD     Assistive Device (Stand-Sit Transfers) walker, front-wheeled  -SD     Row Name 10/11/22 0925          Toilet  Transfer    Type (Toilet Transfer) sit-stand;stand-sit  -SD     [place] Level (Toilet Transfer) minimum assist (75% patient effort);1 person assist;verbal cues  -SD     Assistive Device (Toilet Transfer) commode;grab bars/safety frame;walker, front-wheeled  -SD     Row Name 10/11/22 0925          Functional Mobility    Functional Mobility- Ind. Level contact guard assist;verbal cues required;1 person  -SD     Functional Mobility- Device walker, front-wheeled  -SD     Functional Mobility-Distance (Feet) 20  -SD     Functional Mobility- Safety Issues balance decreased during turns;sequencing ability decreased  -SD     Row Name 10/11/22 0925          Activities of Daily Living    BADL Assessment/Intervention grooming;toileting;lower body dressing;upper body dressing  -SD     Row Name 10/11/22 0925          Lower Body Dressing Assessment/Training    [place] Level (Lower Body Dressing) don;socks;supervision  -SD     Position (Lower Body Dressing) edge of bed sitting  -SD     Row Name 10/11/22 0925          Grooming Assessment/Training    [place] Level (Grooming) hair care, combing/brushing;oral care regimen;wash face, hands;supervision  -SD     Position (Grooming) sink side;unsupported sitting;supported standing  -SD     Row Name 10/11/22 0925          Toileting Assessment/Training    [place] Level (Toileting) adjust/manage clothing;perform perineal hygiene;supervision  -SD     Assistive Devices (Toileting) commode;grab bar/safety frame  -SD     Position (Toileting) unsupported sitting  -SD     Row Name 10/11/22 0925          Upper Body Dressing Assessment/Training    [place] Level (Upper Body Dressing) don;pajama/robe;minimum assist (75% patient effort)  -SD     Position (Upper Body Dressing) edge of bed sitting  -SD           User Key  (r) = Recorded By, (t) = Taken By, (c) = Cosigned By    Initials Name Provider Type    Nancie Roldan OT Occupational Therapist                Obj/Interventions     Row Name 10/11/22 0927          Balance    Static Sitting Balance supervision  -SD     Dynamic Sitting Balance contact guard  -SD     Position, Sitting Balance unsupported;sitting edge of bed  -SD     Sit to Stand Dynamic Balance minimal assist;1-person assist;verbal cues  -SD     Static Standing Balance contact guard;verbal cues  -SD     Dynamic Standing Balance contact guard;verbal cues  -SD     Position/Device Used, Standing Balance supported;walker, front-wheeled  -SD     Balance Interventions sitting;standing;occupation based/functional task;UE activity with balance activity;dynamic reaching  -SD           User Key  (r) = Recorded By, (t) = Taken By, (c) = Cosigned By    Initials Name Provider Type    Nancie Roldan OT Occupational Therapist               Goals/Plan    No documentation.                Clinical Impression     Row Name 10/11/22 0928          Pain Assessment    Pretreatment Pain Rating 3/10  -SD     Posttreatment Pain Rating 3/10  -SD     Pain Location - Side/Orientation Left  -SD     Pain Location - foot  -SD     Pain Intervention(s) Ambulation/increased activity;Emotional support;Nursing Notified;Repositioned;Elevated  -SD     Row Name 10/11/22 0928          Plan of Care Review    Plan of Care Reviewed With patient  -SD     Progress improving  -SD     Outcome Evaluation Pt. requiring increased assist to move STS from commode & sitting on BSC @sink side to complete grooming tasks due to fatigue and fear of falling. Pt. donned sock with SUP sitting EOB. Pt. requiring additional time for rest & recovery between tasks. Pt. reports increased pain in foot last night & unable to sleep. Will cont to progress pt as able per POC. Recommend home with 24/7 care & HHOT upon d/c.  -SD     Row Name 10/11/22 0928          Therapy Assessment/Plan (OT)    Rehab Potential (OT) good, to achieve stated therapy goals  -SD     Criteria for Skilled Therapeutic Interventions Met (OT)  yes;meets criteria;skilled treatment is necessary  -SD     Therapy Frequency (OT) daily  -SD     Row Name 10/11/22 0928          Therapy Plan Review/Discharge Plan (OT)    Anticipated Discharge Disposition (OT) home with /7 care;home with home health  -SD     Row Name 10/11/22 0928          Vital Signs    Pre Systolic BP Rehab 130  -SD     Pre Treatment Diastolic BP 54  -SD     Posttreatment Heart Rate (beats/min) 71  -SD     O2 Delivery Pre Treatment room air  -SD     O2 Delivery Intra Treatment room air  -SD     Post SpO2 (%) 99  -SD     O2 Delivery Post Treatment room air  -SD     Pre Patient Position Sitting  -SD     Intra Patient Position Standing  -SD     Post Patient Position Sitting  -SD     Row Name 10/11/22 0928          Positioning and Restraints    Pre-Treatment Position in bed  -SD     Post Treatment Position chair  -SD     In Chair notified nsg;reclined;call light within reach;encouraged to call for assist;exit alarm on;legs elevated;waffle cushion  -SD           User Key  (r) = Recorded By, (t) = Taken By, (c) = Cosigned By    Initials Name Provider Type    Nancie Roldan OT Occupational Therapist               Outcome Measures     Row Name 10/11/22 0837          How much help from another is currently needed...    Putting on and taking off regular lower body clothing? 3  -SD     Bathing (including washing, rinsing, and drying) 2  -SD     Toileting (which includes using toilet bed pan or urinal) 3  -SD     Putting on and taking off regular upper body clothing 3  -SD     Taking care of personal grooming (such as brushing teeth) 3  -SD     Eating meals 3  -SD     AM-PAC 6 Clicks Score (OT) 17  -SD     [facility] Name 10/11/22 0837          Functional Assessment    Outcome Measure Options AM-PAC 6 Clicks Daily Activity (OT)  -SD           User Key  (r) = Recorded By, (t) = Taken By, (c) = Cosigned By    Initials Name Provider Type    Nancie Roldan OT Occupational Therapist                 Occupational Therapy Education     Title: PT OT SLP Therapies (In Progress)     Topic: Occupational Therapy (In Progress)     Point: ADL training (Done)     Description:   Instruct learner(s) on proper safety adaptation and remediation techniques during self care or transfers.   Instruct in proper use of assistive devices.              Learning Progress Summary           Patient Acceptance, E, VU by MA at 10/8/2022 1146                   Point: Home exercise program (Not Started)     Description:   Instruct learner(s) on appropriate technique for monitoring, assisting and/or progressing therapeutic exercises/activities.              Learner Progress:  Not documented in this visit.          Point: Precautions (Done)     Description:   Instruct learner(s) on prescribed precautions during self-care and functional transfers.              Learning Progress Summary           Patient Acceptance, E, VU by MA at 10/8/2022 1146                   Point: Body mechanics (Done)     Description:   Instruct learner(s) on proper positioning and spine alignment during self-care, functional mobility activities and/or exercises.              Learning Progress Summary           Patient Acceptance, E, VU by MA at 10/8/2022 1146                               User Key     Initials Effective Dates Name Provider Type Discipline    MA 11/19/20 -  Larissa Canada OT Occupational Therapist OT              OT Recommendation and Plan  Therapy Frequency (OT): daily  Plan of Care Review  Plan of Care Reviewed With: patient  Progress: improving  Outcome Evaluation: Pt. requiring increased assist to move STS from commode & sitting on BSC @sink side to complete grooming tasks due to fatigue and fear of falling. Pt. donned sock with SUP sitting EOB. Pt. requiring additional time for rest & recovery between tasks. Pt. reports increased pain in foot last night & unable to sleep. Will cont to progress pt as able per POC. Recommend home with 24/7  care & HHOT upon d/c.     Time Calculation:    Time Calculation- OT     Row Name 10/11/22 0933             Time Calculation- OT    OT Received On 10/11/22  -SD      OT Goal Re-Cert Due Date 10/18/22  -SD         Timed Charges    39713 - OT Therapeutic Activity Minutes 20  -SD      65204 - OT Self Care/Mgmt Minutes 25  -SD         Total Minutes    Timed Charges Total Minutes 45  -SD       Total Minutes 45  -SD            User Key  (r) = Recorded By, (t) = Taken By, (c) = Cosigned By    Initials Name Provider Type    Nancie Roldan OT Occupational Therapist              Therapy Charges for Today     Code Description Service Date Service Provider Modifiers Qty    91975261072 HC OT SELF CARE/MGMT/TRAIN EA 15 MIN 10/11/2022 Nancie Dhillon OT GO 2    94842033226 HC OT THERAPEUTIC ACT EA 15 MIN 10/11/2022 Nancie Dhillon OT GO 1               Nancie Dhillon OT  10/11/2022

## 2022-10-11 NOTE — PROGRESS NOTES
"                    Clinical Nutrition       Patient Name: Susan Anderson  YOB: 1940  MRN: [id number]  Date of Encounter: 10/11/22 17:12 EDT  Admission date: 10/6/2022      Reason for Visit   Follow-up protocol, Identified at risk by screening criteria, MST score 2+, wound      EMR  Reviewed   Yes    Height: Height: 160 cm (63\")  Weight: Weight: 75.1 kg (165 lb 8 oz) (10/10/22 0417)  BMI: BMI (Calculated): 29.3    Problem:    GIB (gastrointestinal bleeding)    Diabetic foot ulcer (HCC)    PVD (peripheral vascular disease) (HCC)    Diabetes mellitus with neuropathy (HCC)    Essential hypertension    Stage 3a chronic kidney disease (HCC)    NICM (nonischemic cardiomyopathy) (HCC)    Coronary artery disease involving native coronary artery of native heart without angina pectoris    Dyslipidemia    Chronic combined systolic and diastolic heart failure (HCC)    Degeneration of lumbar or lumbosacral intervertebral disc    Anemia, chronic disease    Demand ischemia (HCC)    Sleep apnea    Vasovagal syncope    Paroxysmal atrial fibrillation (HCC)    Weight Weight (kg) Weight (lbs) Weight Method VISIT REPORT   10/10/2022 75.07 kg 165 lb 8 oz Bed scale -   10/6/2022 71.215 kg 157 lb - -   10/6/2022 71.215 kg 157 lb Stated -   9/22/2022 75 kg 165 lb 5.5 oz Bed scale -   Stable weight status noted since last adm         Reported/Observed/Food/Nutrition Related - Comments   Pt reports appetite is fair,eating somewhat better today Discussed intake w/ pt and need for protein and wound healing. She does not like our protein supplements, her daughter buys her a protein powder and she drinks this at home. She cannot remember its name but it was recommended by Dr. Finney.    Spoke w/ RN as pt is in Endo for scope, pt has returned and is not able to be interviewed at this time. She does not appear to have had a 51 lb wt loss as her EHR reflects. I have discussed this w/ her RN Aditi and she will try to get a new weight " when possible.      Current Nutrition Prescription     Diet Regular; Consistent Carbohydrate, Cardiac  Orders Placed This Encounter      DIET MESSAGE Allow pt 1% milk fat in the morning w/ cereal, she does not drink coffee send hot tea or sugar free hot cocoa      Average Intake from [employer]% of 6 meals    Nutrition Diagnosis     10/7, 10/10  Problem Altered GI function   Etiology GIB- gastric ulcers   Signs/Symptoms Diet advanced averaging 25% intake s/p EGD   Status: improving    Actions     Follow treatment progress, Care plan reviewed, Advise alternate selection, Menu adjusted, Encourage intake, Supplement offered/refused       Monitor Per Protocol      Jovanna Johnson, MS,RD,LD,   Time Spent: 20 mins

## 2022-10-12 ENCOUNTER — READMISSION MANAGEMENT (OUTPATIENT)
Dept: CALL CENTER | Facility: HOSPITAL | Age: 82
End: 2022-10-12

## 2022-10-12 VITALS
TEMPERATURE: 98.5 F | WEIGHT: 165.5 LBS | RESPIRATION RATE: 16 BRPM | BODY MASS INDEX: 29.32 KG/M2 | DIASTOLIC BLOOD PRESSURE: 91 MMHG | HEIGHT: 63 IN | OXYGEN SATURATION: 97 % | SYSTOLIC BLOOD PRESSURE: 135 MMHG | HEART RATE: 64 BPM

## 2022-10-12 LAB
GLUCOSE BLDC GLUCOMTR-MCNC: 319 MG/DL (ref 70–130)
GLUCOSE BLDC GLUCOMTR-MCNC: 358 MG/DL (ref 70–130)
HCT VFR BLD AUTO: 25 % (ref 34–46.6)
HGB BLD-MCNC: 8.2 G/DL (ref 12–15.9)

## 2022-10-12 PROCEDURE — G0378 HOSPITAL OBSERVATION PER HR: HCPCS

## 2022-10-12 PROCEDURE — 63710000001 GABAPENTIN 100 MG CAPSULE: Performed by: HOSPITALIST

## 2022-10-12 PROCEDURE — A9270 NON-COVERED ITEM OR SERVICE: HCPCS | Performed by: NURSE PRACTITIONER

## 2022-10-12 PROCEDURE — 63710000001 CLOPIDOGREL 75 MG TABLET: Performed by: HOSPITALIST

## 2022-10-12 PROCEDURE — A9270 NON-COVERED ITEM OR SERVICE: HCPCS | Performed by: HOSPITALIST

## 2022-10-12 PROCEDURE — 63710000001 LACTOBACILLUS ACIDOPHILUS CAPSULE: Performed by: NURSE PRACTITIONER

## 2022-10-12 PROCEDURE — 63710000001 HYDRALAZINE 25 MG TABLET: Performed by: INTERNAL MEDICINE

## 2022-10-12 PROCEDURE — 63710000001 GUAIFENESIN 600 MG TABLET SUSTAINED-RELEASE 12 HOUR: Performed by: NURSE PRACTITIONER

## 2022-10-12 PROCEDURE — 63710000001 BUMETANIDE 1 MG TABLET: Performed by: INTERNAL MEDICINE

## 2022-10-12 PROCEDURE — 85014 HEMATOCRIT: CPT | Performed by: HOSPITALIST

## 2022-10-12 PROCEDURE — A9270 NON-COVERED ITEM OR SERVICE: HCPCS | Performed by: INTERNAL MEDICINE

## 2022-10-12 PROCEDURE — 63710000001 POLYETHYLENE GLYCOL 17 G PACK: Performed by: NURSE PRACTITIONER

## 2022-10-12 PROCEDURE — 63710000001 INSULIN LISPRO (HUMAN) PER 5 UNITS: Performed by: NURSE PRACTITIONER

## 2022-10-12 PROCEDURE — 63710000001 METOPROLOL TARTRATE 12.5 MG TABLET: Performed by: NURSE PRACTITIONER

## 2022-10-12 PROCEDURE — 63710000001 APIXABAN 2.5 MG TABLET: Performed by: INTERNAL MEDICINE

## 2022-10-12 PROCEDURE — 63710000001 ATORVASTATIN 40 MG TABLET: Performed by: NURSE PRACTITIONER

## 2022-10-12 PROCEDURE — 85018 HEMOGLOBIN: CPT | Performed by: HOSPITALIST

## 2022-10-12 PROCEDURE — 63710000001 VITAMIN B-12 100 MCG TABLET: Performed by: NURSE PRACTITIONER

## 2022-10-12 PROCEDURE — 99239 HOSP IP/OBS DSCHRG MGMT >30: CPT | Performed by: NURSE PRACTITIONER

## 2022-10-12 PROCEDURE — 63710000001 PANTOPRAZOLE 40 MG TABLET DELAYED-RELEASE: Performed by: HOSPITALIST

## 2022-10-12 PROCEDURE — 63710000001 SENNOSIDES-DOCUSATE 8.6-50 MG TABLET: Performed by: NURSE PRACTITIONER

## 2022-10-12 PROCEDURE — 82962 GLUCOSE BLOOD TEST: CPT

## 2022-10-12 RX ORDER — BISACODYL 10 MG
10 SUPPOSITORY, RECTAL RECTAL
Status: ON HOLD
Start: 2022-10-12 | End: 2023-01-03

## 2022-10-12 RX ORDER — GABAPENTIN 100 MG/1
100 CAPSULE ORAL 3 TIMES DAILY
Qty: 15 CAPSULE | Refills: 0 | Status: SHIPPED | OUTPATIENT
Start: 2022-10-12 | End: 2022-10-25

## 2022-10-12 RX ORDER — PANTOPRAZOLE SODIUM 40 MG/1
40 TABLET, DELAYED RELEASE ORAL
Qty: 60 TABLET | Refills: 0 | Status: SHIPPED | OUTPATIENT
Start: 2022-10-12 | End: 2022-10-28 | Stop reason: SDUPTHER

## 2022-10-12 RX ORDER — POLYETHYLENE GLYCOL 3350 17 G/17G
17 POWDER, FOR SOLUTION ORAL DAILY
Qty: 510 G | Refills: 0 | Status: ON HOLD | OUTPATIENT
Start: 2022-10-12 | End: 2023-02-02 | Stop reason: SDUPTHER

## 2022-10-12 RX ADMIN — CLOPIDOGREL BISULFATE 75 MG: 75 TABLET ORAL at 09:16

## 2022-10-12 RX ADMIN — POLYETHYLENE GLYCOL 3350 17 G: 17 POWDER, FOR SOLUTION ORAL at 09:16

## 2022-10-12 RX ADMIN — INSULIN LISPRO 8 UNITS: 100 INJECTION, SOLUTION INTRAVENOUS; SUBCUTANEOUS at 12:56

## 2022-10-12 RX ADMIN — Medication 12.5 MG: at 09:16

## 2022-10-12 RX ADMIN — BUMETANIDE 0.5 MG: 1 TABLET ORAL at 09:16

## 2022-10-12 RX ADMIN — PANTOPRAZOLE SODIUM 40 MG: 40 TABLET, DELAYED RELEASE ORAL at 09:20

## 2022-10-12 RX ADMIN — Medication 10 ML: at 09:17

## 2022-10-12 RX ADMIN — HYDRALAZINE HYDROCHLORIDE 25 MG: 25 TABLET ORAL at 06:25

## 2022-10-12 RX ADMIN — GUAIFENESIN 1200 MG: 600 TABLET, EXTENDED RELEASE ORAL at 09:16

## 2022-10-12 RX ADMIN — VITAM B12 100 MCG: 100 TAB at 09:16

## 2022-10-12 RX ADMIN — ATORVASTATIN CALCIUM 40 MG: 40 TABLET, FILM COATED ORAL at 09:16

## 2022-10-12 RX ADMIN — HYDRALAZINE HYDROCHLORIDE 25 MG: 25 TABLET ORAL at 12:56

## 2022-10-12 RX ADMIN — GABAPENTIN 100 MG: 100 CAPSULE ORAL at 09:16

## 2022-10-12 RX ADMIN — SENNOSIDES AND DOCUSATE SODIUM 2 TABLET: 50; 8.6 TABLET ORAL at 09:16

## 2022-10-12 RX ADMIN — Medication 1 CAPSULE: at 09:16

## 2022-10-12 RX ADMIN — INSULIN LISPRO 7 UNITS: 100 INJECTION, SOLUTION INTRAVENOUS; SUBCUTANEOUS at 09:15

## 2022-10-12 RX ADMIN — APIXABAN 2.5 MG: 2.5 TABLET, FILM COATED ORAL at 09:16

## 2022-10-12 NOTE — PROGRESS NOTES
INFECTIOUS DISEASE PROGRESS NOTE    Susan Anderson  1940  [id number]    Date of Consult: 10/7/2022    Admission Date: 10/6/2022      Requesting Provider: Jason Garcia DO  Evaluating Physician: Stef Pete MD    Reason for Consultation: Bilateral foot infections    History of present illness:    10/7/2022: Patient is a 82 y.o. female with h/o type 2 diabetes mellitus, systolic/diastolic congestive heart failure, stage II-3 chronic kidney disease, chronic right leg lymphedema, afib/Eliquis, coronary artery disease, gout, asthma, peripheral vascular disease, MSSA right hallux osteomyelitis requiring transmetatarsal amputation, cervical cancer, and a recent left dorsal foot ulcer with cellulitis who we were asked to see for reassessment of bilateral foot infections in setting of GI bleed.  She has been on prolonged oral Augmentin for residual MSSA foot infection after the infection extended over into the second metatarsal phalangeal region.  This wound has gradually improved and is now healed but she remains at high risk for persistent infection/relapse.  She developed a distal left dorsal foot ulcer earlier in 9/2022 and was placed on doxycycline along with silver dressing therapy at her nursing facility. [facility] during which she underwent left SFA angioplasty and stent placement. She was discharged on oral Augmentin and oral minocycline.  Her left dorsal foot wound look significantly better this past week in the office.      [facility] for a near syncopal event.  She was doing well over the past week.  She recently restarted Plavix.  On 10/5, she became weak and fatigued.  She has some nausea, but no fever or chills.  On 10/6, the patient went to bathroom as she felt she need to have a BM and had a near syncopal event.  On arrival, the patient is afebrile and hemodynamically stable.  Her admitting labs were WBC 9300 with 67% neutrophils, Hgb 7.9,  creatinine 1.45 (baseline around 1.2), and UA WBC TNTC with large LE/negative nitrite.  A urine culture is pending. She was transfused with 1U pRBC.  A CXR, CT scan of head, and CT scan of a/p all showed no acute findings.  She was continued on Augmentin and minocycline.     10/8/2022: She feels somewhat better today.  She has decreased dyspnea.  She denies increased left foot pain.  She denies nausea, vomiting, and abdominal pain.  She denies melena and hematochezia.  She has remained afebrile.  Her hemoglobin today is 9.3.    10/9/22:  She has remained afebrile overnight.Hemoglobin was 9.4 early this morning.  She remains confused.  She complains of severe fatigue.  She denies melena and hematochezia.    10/10/22: White blood cell count is 4.9 and her hemoglobin is 8.8.  Her creatinine is 0.89.  She has remained afebrile.  She denies increase dyspnea.     10/11/22: She has remained afebrile. Hemoglobin today is 8.1. She complains of left foot pain.  She is less confused today.  She denies hematochezia and melena.    10/12/22: She has remained afebrile. Her hemoglobin today is 8.3. She feels better today.  She has decreased dyspnea.  She denies melena and hematochezia.  She would like to go home.    Past Medical History:   Diagnosis Date   • Arthritis    • Asthma 6/4 - double pneumonia    Currently on inhaler and nebulizer   • Atrial fibrillation (HCC) 8/21/2022   • Cancer (HCC)     cervical cancer, skin cancer   • CHF (congestive heart failure) (HCC) June 4, 2021   • Chronic kidney disease Related to diabetes   • Coronary artery disease 6/4/2021 DX for hear failure   • Diabetes mellitus (HCC) 30 years    Seeing Dr. Lam 1st time Aug 19   • Gout    • Hx of colonoscopy    • Hyperlipidemia Reference current labs x 2-3yrs approx   • Hypertension 30 years   • Migraine    • Mitral valve disease    • Mitral valve disease    • Osteomyelitis (HCC)    • Peripheral neuropathy    • Sleep apnea    • Type 2 diabetes mellitus  (HCC)     30 years       Past Surgical History:   Procedure Laterality Date   • ABDOMINAL HYSTERECTOMY W/SALPINGECTOMY     • AMPUTATION  Right great toe, 1st 1/3 metatarsal -[place] 4/14/21   • AORTAGRAM N/A 4/9/2021    Procedure: AORTAGRAM WITH OR WITHOUT RUNOFFS, WITH Co2;  Surgeon: Trey Forrest MD;  Location:  [facility];  Service: Vascular;  Laterality: N/A;   • AORTAGRAM N/A 9/28/2022    Procedure: CO2 ANGIOGRAM, LEFT SFA ANGIOPLASTY WITH DRUG ELUTING BALLOON, LEFT SFA STENT PLACEMENT ;  Surgeon: Trey Forrest MD;  Location:  [facility];  Service: Vascular;  Laterality: N/A;  FLUORO: 13.12  DOSE 162mGy  CONTRAST: Isovue 350: 15ml   • APPENDECTOMY     • BRAIN TUMOR EXCISION      laser surgery    • CARDIAC CATHETERIZATION N/A 6/21/2021    Procedure: LEFT HEART CATH;  Surgeon: Anjum Ceballos MD;  Location:  [facility];  Service: Cardiology;  Laterality: N/A;   • CATARACT EXTRACTION W/ INTRAOCULAR LENS  IMPLANT, BILATERAL     • CHOLECYSTECTOMY     • ENDOSCOPY N/A 10/7/2022    Procedure: ESOPHAGOGASTRODUODENOSCOPY;  Surgeon: Stef Veras MD;  Location:  [facility];  Service: Gastroenterology;  Laterality: N/A;   • EYE SURGERY     • HYSTERECTOMY     • TOE SURGERY     • TRANS METATARSAL AMPUTATION Right 4/14/2021    Procedure: AMPUTATION TRANS METATARSAL RIGHT GREAT TOE;  Surgeon: Valdez Finney MD;  Location:  [facility];  Service: Vascular;  Laterality: Right;       Family History   Problem Relation Age of Onset   • Diabetes Mother         Type II   • Heart disease Father    • Heart attack Father    • Coronary artery disease Father    • Diabetes Father         Type II   • Diabetes Sister    • Diabetes Maternal Grandmother    • Diabetes Maternal Grandfather    • Diabetes Paternal Grandmother    • Diabetes Paternal Grandfather        Social History     Socioeconomic History   • Marital status:    • Number of children: 1   Tobacco Use   • Smoking status: Never   • Smokeless  tobacco: Never   Vaping Use   • Vaping Use: Never used   Substance and Sexual Activity   • Alcohol use: Yes     Comment: Social drinking when not recovering from hospitalization   • Drug use: Never   • Sexual activity: Not Currently     Birth control/protection: None       Allergies   Allergen Reactions   • Baclofen Other (See Comments) and Hallucinations     PSYCHOSIS-POA REFUSES ADMINISTRATION OF THIS MED.   • Cephalexin Nausea Only   • Erythromycin Base Nausea Only   • Melatonin Other (See Comments)     Nightmares   • Oxycodone Nausea Only   • Sulfa Antibiotics Nausea Only         Medication:    Current Facility-Administered Medications:   •  acetaminophen (TYLENOL) tablet 650 mg, 650 mg, Oral, Q6H PRN, Linda Lakhani APRN, 650 mg at 10/11/22 0224  •  amoxicillin-clavulanate (AUGMENTIN) 875-125 MG per tablet 1 tablet, 1 tablet, Oral, Q24H, Linda Lakhani APRN, 1 tablet at 10/11/22 2316  •  apixaban (ELIQUIS) tablet 2.5 mg, 2.5 mg, Oral, BID, Anjum Ceballos MD, 2.5 mg at 10/11/22 2316  •  atorvastatin (LIPITOR) tablet 40 mg, 40 mg, Oral, Daily, Linda Lakhani APRTED, 40 mg at 10/11/22 0826  •  bisacodyl (DULCOLAX) suppository 10 mg, 10 mg, Rectal, Daily PRN, Linda Lakhani APRN  •  bumetanide (BUMEX) tablet 0.5 mg, 0.5 mg, Oral, Daily, Fortino Constantino MD, 0.5 mg at 10/11/22 0826  •  clopidogrel (PLAVIX) tablet 75 mg, 75 mg, Oral, Daily, Breezy Felder MD, 75 mg at 10/11/22 0826  •  dextrose (D50W) (25 g/50 mL) IV injection 25 g, 25 g, Intravenous, Q15 Min PRN, Linda Lakhani APRN  •  dextrose (GLUTOSE) oral gel 15 g, 15 g, Oral, Q15 Min PRN, Linda Lakhani, APRN  •  gabapentin (NEURONTIN) capsule 100 mg, 100 mg, Oral, TID, Breezy Felder MD, 100 mg at 10/11/22 2316  •  glucagon (human recombinant) (GLUCAGEN DIAGNOSTIC) injection 1 mg, 1 mg, Intramuscular, Q15 Min PRN, Linda Lakhani, APRN  •  guaiFENesin (MUCINEX) 12 hr tablet 1,200 mg, 1,200 mg, Oral, BID, Linda Lakhani R, APRN, 1,200  mg at 10/11/22 2316  •  hydrALAZINE (APRESOLINE) tablet 25 mg, 25 mg, Oral, Q8H, Fortino Constantino MD, 25 mg at 10/12/22 0625  •  HYDROcodone-acetaminophen (NORCO) 5-325 MG per tablet 1 tablet, 1 tablet, Oral, Q6H PRN, Mann Jessica, APRN, 1 tablet at 10/08/22 1344  •  Insulin Lispro (humaLOG) injection 0-9 Units, 0-9 Units, Subcutaneous, TID GUEVARA, Linda Lakhani APRN, 8 Units at 10/11/22 1702  •  lactobacillus acidophilus (RISAQUAD) capsule 1 capsule, 1 capsule, Oral, Daily, Linda Lakhani APRN, 1 capsule at 10/11/22 0826  •  magnesium sulfate 4 gram infusion - Mg less than or equal to 1mg/dL, 4 g, Intravenous, PRN **OR** magnesium sulfate 3 gram infusion (1gm x 3) - Mg 1.1 - 1.5 mg/dL, 1 g, Intravenous, PRN **OR** Magnesium Sulfate 2 gram infusion- Mg 1.6 - 1.9 mg/dL, 2 g, Intravenous, PRN, Linda Lakhani, APRN  •  metoprolol tartrate (LOPRESSOR) half tablet 12.5 mg, 12.5 mg, Oral, Q12H, Linda Lakhani, APRN, 12.5 mg at 10/11/22 2316  •  ondansetron (ZOFRAN) injection 4 mg, 4 mg, Intravenous, Q6H PRN, Linda Lakhani APRN, 4 mg at 10/09/22 2106  •  pantoprazole (PROTONIX) EC tablet 40 mg, 40 mg, Oral, BID AC, Breezy Felder MD, 40 mg at 10/11/22 1702  •  polyethylene glycol (MIRALAX) packet 17 g, 17 g, Oral, Daily, Denny Zelaya APRN, 17 g at 10/11/22 0827  •  sennosides-docusate (PERICOLACE) 8.6-50 MG per tablet 2 tablet, 2 tablet, Oral, BID, Linad Lakhani APRN, 2 tablet at 10/11/22 0827  •  sodium chloride 0.9 % flush 10 mL, 10 mL, Intravenous, PRN, Jah Hooper MD  •  sodium chloride 0.9 % flush 10 mL, 10 mL, Intravenous, Q12H, Linda Lakhani APRN, 10 mL at 10/11/22 2319  •  sodium chloride 0.9 % flush 10 mL, 10 mL, Intravenous, PRN, Linda Lakhani APRN  •  vitamin B-12 (CYANOCOBALAMIN) tablet 100 mcg, 100 mcg, Oral, Daily, Linda Lakhani APRN, 100 mcg at 10/11/22 0840    Antibiotics:  Anti-Infectives (From admission, onward)    Ordered     Dose/Rate Route  Frequency Start Stop    10/06/22 2058  amoxicillin-clavulanate (AUGMENTIN) 875-125 MG per tablet 1 tablet        Ordering Provider: Linda Lakhani APRN    1 tablet Oral Every 24 Hours 10/06/22 2200 10/28/22 2159            Review of Systems:  See HPI      Physical Exam:   Vital Signs  Temp (24hrs), Av.2 °F (36.2 °C), Min:96.3 °F (35.7 °C), Max:98.1 °F (36.7 °C)    Temp  Min: 96.3 °F (35.7 °C)  Max: 98.1 °F (36.7 °C)  BP  Min: 129/56  Max: 142/79  Pulse  Min: 64  Max: 81  Resp  Min: 16  Max: 18  SpO2  Min: 93 %  Max: 98 %    GENERAL: Awake, appears confused, in no acute distress.   HEENT: Normocephalic, atraumatic.  PERRL. EOMI. No conjunctival injection. No icterus. Oropharynx clear without evidence of thrush or exudate.  NECK: Supple   HEART: RRR; No murmur,  LUNGS: Clear to auscultation bilaterally without wheezing, rales, rhonchi. Normal respiratory effort. Nonlabored.  ABDOMEN: Soft, nontender, nondistended. . No rebound or guarding. NO mass or HSM.  EXT:  No cyanosis, clubbing or edema. No cord.  :  Without Bui catheter.  MSK:  FROM, no joint effusions.   SKIN: Warm and dry without cutaneous eruptions on Inspection/palpation.  Left dorsal foot with silver dressing. This wound appears substantially improved with increased granulation tissue and some reepithelialization along the wound margins. This remains improved today.  NEURO: Awake, confused, not oriented.  PSYCHIATRIC: Cooperative with PE    Laboratory Data    Results from last 7 days   Lab Units 10/11/22  1953 10/11/22  0517 10/10/22  1814 10/10/22  1131 10/10/22  0631 10/09/22  1245 10/09/22  0641   WBC 10*3/mm3  --  5.53  --   --  4.93  --  6.56   HEMOGLOBIN g/dL 8.3* 8.2*  8.1* 9.4*   < > 8.8*   < > 9.0*  9.0*   HEMATOCRIT % 25.0* 25.7*  25.5* 27.5*   < > 26.9*   < > 26.3*  26.3*   PLATELETS 10*3/mm3  --  218  --   --  229  --  242    < > = values in this interval not displayed.     Results from last 7 days   Lab Units 10/10/22  0631    SODIUM mmol/L 139   POTASSIUM mmol/L 3.6   CHLORIDE mmol/L 106   CO2 mmol/L 24.0   BUN mg/dL 34*   CREATININE mg/dL 0.89   GLUCOSE mg/dL 164*   CALCIUM mg/dL 7.5*     Results from last 7 days   Lab Units 10/07/22  0953   ALK PHOS U/L 145*   BILIRUBIN mg/dL 0.7   ALT (SGPT) U/L 6   AST (SGOT) U/L 17                         Estimated Creatinine Clearance: 46.5 mL/min (by C-G formula based on SCr of 0.89 mg/dL).      Microbiology:  Urine cx NGSF          Radiology:  Imaging Results (Last 72 Hours)     ** No results found for the last 72 hours. **            Impression:   1. Left dorsal foot ulcer/wound infection- Wound is now dramatically better after vascular intervention.  She underwent vascular intervention on 9/28. Her left dorsal foot ulcer appears significantly improved.  This has continued to improve.   2. MSSA right foot osteomyelitis- she had an extensive, limb threatening right foot infection which finally healed after multiple surgical interventions and prolonged intravenous followed by oral antibiotic therapy.  I have considered her a very high risk for relapse of her MSSA right foot osteomyelitis and I have left her on chronic oral antibiotic suppression.  3. Acute GI bleed-With gastric ulcers found on EGD.  4. Pyuria, possible UTI vs colonization.   5. Anemia secondary to GI bleed. 1U pRBC transfusion 10/6.  6. Near syncopal event  7. Acute renal insufficiency. ATN vs dehydration.   8. History of right knee hematoma-this was quite extensive and required hospitalization in 8/22.  This is now resolved.  9. Type 2 diabetes mellitus-this increases her risk for recurrent infections  10. Peripheral vascular disease-she has arterial peripheral vascular disease and also venous stasis disease.  11. Peripheral neuropathy  12. Cognitive impairment- she has had intermittent cognitive impairment that has at times appear to be medication related  13. Stage II-3 chronic kidney disease  14. Systolic/diastolic  congestive heart failure  15. Atrial fibrillation/Eliquis  16.  Keflex (nausea), erm (nausea), Sulfa (nausea) intolerances.    PLAN/RECOMMENDATIONS:   1. Continue Augmentin 875 mg PO daily  2. Continue wound care    I discussed her clinical situation and disposition with Dr. Felder today.        Stef Pete MD  10/12/2022  07:57 EDT

## 2022-10-12 NOTE — CASE MANAGEMENT/SOCIAL WORK
Case Management Discharge Note      Final Note: Spoke with patient and family member at bedside. Plan is home with family support. Patient denieds any d.c. needs. Family to transport home.         Selected Continued Care - Admitted Since 10/6/2022     Destination    No services have been selected for the patient.              Durable Medical Equipment    No services have been selected for the patient.              Dialysis/Infusion    No services have been selected for the patient.              Home Medical Care    No services have been selected for the patient.              Therapy    No services have been selected for the patient.              Community Resources    No services have been selected for the patient.              Community & DME    No services have been selected for the patient.                Selected Continued Care - Prior Encounters Includes continued care and service providers with selected services from prior encounters from 7/8/2022 to 10/12/2022    Discharged on 9/30/2022 Admission date: 9/21/2022 - Discharge disposition: Home or Self Care    Destination     Service Provider Selected Services Address Phone Fax Patient Preferred    [facility] [address] [place] [id number] [phone] --                Discharged on 9/3/2022 Admission date: 8/21/2022 - Discharge disposition: Rehab Facility or Unit (DC - External)    Destination     Service Provider Selected Services Address Phone Fax Patient Preferred    [facility] [address] [place] [id number] [phone] --                Discharged on 8/12/2022 Admission date: 7/26/2022 - Discharge disposition: Skilled Nursing Facility (DC - External)    Destination     Service Provider Selected Services Address Phone Fax Patient Preferred    [facility] [address] [place] [id number] [phone] --                          Final Discharge Disposition Code: 01 - home or self-care

## 2022-10-12 NOTE — DISCHARGE SUMMARY
[facility] Medicine Services  DISCHARGE SUMMARY    Patient Name: Susan Anderson  : 1940  MRN: [id number]    Date of Admission: 10/6/2022  2:36 PM  Date of Discharge:  10/12/2022  Primary Care Physician: Arleen Doherty MD    Consults     Date and Time Order Name Status Description    10/7/2022 12:34 AM Inpatient Cardiothoracic Surgery Consult Completed     10/7/2022 12:34 AM Inpatient Infectious Diseases Consult Completed     10/7/2022 12:34 AM Inpatient Cardiology Consult Completed     10/6/2022  8:58 PM Inpatient Gastroenterology Consult Completed     2022  9:44 AM Inpatient Cardiothoracic Surgery Consult Completed     2022 11:01 AM Inpatient Infectious Diseases Consult Completed     2022 12:45 PM Inpatient Cardiology Consult Completed     2022 12:45 PM Inpatient Nephrology Consult Completed           Hospital Course     Presenting Problem:   Syncope, unspecified syncope type [R55]    Active Hospital Problems    Diagnosis  POA   • **GIB (gastrointestinal bleeding) [K92.2]  Yes   • Paroxysmal atrial fibrillation (HCC) [I48.0]  No   • Vasovagal syncope [R55]  Yes   • Sleep apnea [G47.30]  Yes   • Anemia, chronic disease [D63.8]  Yes   • Demand ischemia (HCC) [I24.8]  Yes   • Degeneration of lumbar or lumbosacral intervertebral disc [M51.37]  Yes   • Coronary artery disease involving native coronary artery of native heart without angina pectoris [I25.10]  Yes   • Chronic combined systolic and diastolic heart failure (HCC) [I50.42]  Yes   • NICM (nonischemic cardiomyopathy) ([facility]) [I42.8]  Yes   • Dyslipidemia [E78.5]  Yes   • Essential hypertension [I10]  Yes   • Diabetes mellitus with neuropathy ([facility]) [E11.40]  Yes   • Diabetic foot ulcer ([facility]) [E11.621, L97.509]  Yes   • Stage 3a chronic kidney disease ([facility]) [N18.31]  Yes   • PVD (peripheral vascular disease) ([facility]) [I73.9]  Yes      Resolved Hospital Problems   No resolved problems to display.           Hospital Course:  Susan Anderson is a 82 y.o. female with multiple medical problems that include but not limited to CKD stage III, atrial fibrillation, hypertension, PVD/PAD type 2 diabetes, left lower extremity infection/osteomyelitis s/p mid SFA to LLE with KRISTY, ELIUD who presents to the ED with dizziness, nausea, admitted with suspected GI bleed     Symptomatic acute blood loss anemia- resolved  GI bleed 2/2 gastric ulcers  Near syncope  -CT abd/pelvis is unremarkable, CT head negative  -s/p PRBC  -EGD with gastric ulcers, repeat EGD in 3 months, on PPI bid x 1month then daily  -continue daily miralax and suppository every 3 days if no BM     PAD/PVD  Non-healing Left foot ulcer/osteomyelitis  -Followed by Dr. Finney, s/p KRISTY to mid SFA 9/28  -resumed plavix  -Continue oral antibiotics, currently on Augmentin and followed by ID, Dr. Pete  -continue wound care     L ankle pain  -d/w vascular surgery and ID, to assess and follow up     Relatively well-controlled type 2 diabetes A1c 7.8%  -resume home regimen     Hypertension  -continue home medication     Paroxysmal atrial fibrillation  -Rate seems to be controlled, continue metoprolol  -Eliquis resumed  -to follow up closely with cardiology for evaluation/ discussion of watchman device     Constipation  -s/p disimpaction, with some blood but has reportedly resolved per patient  -miralax and prn suppository      Ischemic cardiomyopathy, HFpEF  -Seems to be currently compensated  -Continues beta-blocker, statin and bumex     CKD stage III  -stable     Hypomagnesemia  -Continue to monitor and replete per protocol     Elevated troponin  -Chronic in the setting of CKD, severe PVD and ICM  -No further work-up is planned at this time     Discharge Follow Up Recommendations for outpatient labs/diagnostics:   PCP follow up 1 week  Cardiology follow up 2 weeks as scheduled  Dr. JEREMIE Pete 2-4 week follow up. Continue  augmentin through follow up    Day of Discharge      HPI:   Up in chair. Asking to go home. States she feels better.    Review of Systems  Gen- No fevers, chills  CV- No chest pain, palpitations  Resp- No cough, dyspnea  GI- No N/V/D, abd pain        Vital Signs:   Temp:  [96.3 °F (35.7 °C)-98.5 °F (36.9 °C)] 98.5 °F (36.9 °C)  Heart Rate:  [64-81] 76  Resp:  [16-18] 16  BP: (133-142)/(54-91) 135/91  Flow (L/min):  [2] 2      Physical Exam:  Constitutional: No acute distress, awake, alert  HENT: NCAT, mucous membranes moist  Respiratory: Clear to auscultation bilaterally, respiratory effort normal   Cardiovascular: RRR, no murmurs, rubs, or gallops  Gastrointestinal: Positive bowel sounds, soft, nontender, nondistended  Musculoskeletal: bandage left foot  Psychiatric: Appropriate affect, cooperative  Neurologic: Oriented x 3, strength symmetric in all extremities, Cranial Nerves grossly intact to confrontation, speech clear  Skin: No rashes      Pertinent  and/or Most Recent Results     LAB RESULTS:      Lab 10/12/22  0859 10/11/22  1953 10/11/22  0517 10/10/22  1814 10/10/22  1131 10/10/22  0631 10/09/22  1245 10/09/22  0641 10/07/22  1840 10/07/22  0953 10/06/22  1450   WBC  --   --  5.53  --   --  4.93  --  6.56  --  7.10 9.26   HEMOGLOBIN 8.2* 8.3* 8.2*  8.1* 9.4* 9.9* 8.8*   < > 9.0*  9.0*   < > 6.8* 7.9*   HEMATOCRIT 25.0* 25.0* 25.7*  25.5* 27.5* 29.3* 26.9*   < > 26.3*  26.3*   < > 20.7* 23.8*   PLATELETS  --   --  218  --   --  229  --  242  --  250 333   NEUTROS ABS  --   --   --   --   --  2.57  --   --   --  4.65 6.19   IMMATURE GRANS (ABS)  --   --   --   --   --  0.34*  --   --   --  0.24* 0.28*   LYMPHS ABS  --   --   --   --   --  1.25  --   --   --  1.39 1.82   MONOS ABS  --   --   --   --   --  0.59  --   --   --  0.57 0.75   EOS ABS  --   --   --   --   --  0.16  --   --   --  0.22 0.19   MCV  --   --  95.5  --   --  91.8  --  90.1  --  89.2 88.1    < > = values in this interval not displayed.         Lab 10/10/22  0631 10/08/22  6454  10/07/22  0953 10/06/22  1450   SODIUM 139 143 140 137   POTASSIUM 3.6 4.0 3.6 3.9   CHLORIDE 106 109* 105 98   CO2 24.0 25.0 26.0 25.0   ANION GAP 9.0 9.0 9.0 14.0   BUN 34* 65* 82* 93*   CREATININE 0.89 0.95 1.20* 1.45*   EGFR 64.4 59.6* 45.0* 35.9*   GLUCOSE 164* 57* 77 170*   CALCIUM 7.5* 8.3* 8.2* 9.1   MAGNESIUM  --   --   --  1.5*         Lab 10/07/22  0953 10/06/22  1450   TOTAL PROTEIN 4.5* 5.7*   ALBUMIN 2.50* 2.90*   GLOBULIN 2.0 2.8   ALT (SGPT) 6 7   AST (SGOT) 17 17   BILIRUBIN 0.7 0.7   ALK PHOS 145* 157*         Lab 10/06/22  1450   TROPONIN T 0.178*             Lab 10/09/22  1245 10/06/22  1718   IRON 37  --    IRON SATURATION 20  --    TIBC 189*  --    TRANSFERRIN 127*  --    ABO TYPING  --  A   RH TYPING  --  Positive   ANTIBODY SCREEN  --  Negative         Brief Urine Lab Results  (Last result in the past 365 days)      Color   Clarity   Blood   Leuk Est   Nitrite   Protein   CREAT   Urine HCG        10/06/22 1602 Yellow   Cloudy   Negative   Large (3+)   Negative   100 mg/dL (2+)               Microbiology Results (last 10 days)     Procedure Component Value - Date/Time    Urine Culture - Urine, Urine, Catheter In/Out [[id number]]  (Normal) Collected: 10/06/22 1602    Lab Status: Final result Specimen: Urine, Catheter In/Out Updated: 10/1940     Urine Culture No growth          CT Abdomen Pelvis Without Contrast    Result Date: 10/6/2022  DATE OF EXAM: 10/6/2022 3:02 PM  PROCEDURE: CT ABDOMEN PELVIS WO CONTRAST-  INDICATIONS: abd pain nv  COMPARISON: CT abdomen and pelvis 9/21/2022  TECHNIQUE: Routine transaxial slices were obtained through the abdomen and pelvis without the administration of intravenous contrast. Reconstructed coronal and sagittal images were also obtained. Automated exposure control and iterative construction methods were used.  The radiation dose reduction device was turned on for each scan per the ALARA (As Low as Reasonably Achievable) protocol.  FINDINGS: Lung bases  are grossly clear with similar peripheral reticulations/chronic interstitial changes. Unremarkable appearance of the liver. The gallbladder is surgically absent. Normal appearance of the bile ducts. Unremarkable appearance of the spleen and pancreas. Normal appearance of the adrenal glands, kidneys, ureters, and bladder. The uterus is surgically absent. There is some inspissated stool in the rectum. No evidence of bowel obstruction or definite inflammatory change of the GI tract. No abdominopelvic free fluid or conspicuous fat stranding. No pneumoperitoneum. Scattered air within the subcutaneous tissues of the anterior mid abdomen, nonspecific, possibly reflecting sites of injection. There is atherosclerosis without aneurysm. No lymphadenopathy. No acute osseous findings.      No significant interval change. No acute abdominopelvic findings.  This report was finalized on 10/6/2022 3:29 PM by Kings Baez MD.      CT Head Without Contrast    Result Date: 10/6/2022  DATE OF EXAM: 10/6/2022 3:02 PM  PROCEDURE: CT HEAD WO CONTRAST-  INDICATIONS: syncope  COMPARISON: 7/26/2022  TECHNIQUE: Routine transaxial and coronal reconstruction images were obtained through the head without the administration of contrast. Automated exposure control and iterative reconstruction methods were used.  The radiation dose reduction device was turned on for each scan per the ALARA (As Low as Reasonably Achievable) protocol.  FINDINGS: Gray-white differentiation is maintained and there is no evidence of intracranial hemorrhage, mass or mass effect. Age-related changes of the brain are present including volume loss and typical periventricular sequela of chronic small vessel ischemia. There is otherwise no evidence of intracranial hemorrhage, mass or mass effect. The ventricles are normal in size and configuration accounting for surrounding volume loss. The orbits are normal and the paranasal sinuses are grossly clear.      Age-related  changes of the brain as above, otherwise without evidence of acute intracranial abnormality.   This report was finalized on 10/6/2022 3:45 PM by Babatunde Dinero.      XR Chest 1 View    Result Date: 10/6/2022  DATE OF EXAM: 10/6/2022 4:02 PM  PROCEDURE: XR CHEST 1 VW-  INDICATIONS: Syncope.  COMPARISON: 08/26/2022.  TECHNIQUE: Single radiographic view of the chest was obtained.  FINDINGS: The heart size is normal. The pulmonary vascular markings are normal. The lungs and pleural spaces are clear of active disease.  There are chronic age-related changes involving the bony thorax and thoracic aorta.      No active disease.  This report was finalized on 10/6/2022 4:13 PM by Juliano Burgos MD.        Results for orders placed during the hospital encounter of 09/21/22    Duplex Lower Extremity Art / Grafts - Bilateral CAR    Interpretation Summary  · Bilateral lower extremity arterial duplex exam reveals diffuse calcified plaque without focal obstruction up to the level of bilateral popliteals.  · Below knee vessels revealed similar findings with non occlusive calcified plaque in the peroneal and anterior tibial arteries with flow demonstrated to the level of ankles. Bilateral posterior tibial flow could not be detected and is suggestive of probable occlusion.  · Bilateral ABIs are noncompressible due to vascular calcifications.      Results for orders placed during the hospital encounter of 09/21/22    Duplex Lower Extremity Art / Grafts - Bilateral CAR    Interpretation Summary  · Bilateral lower extremity arterial duplex exam reveals diffuse calcified plaque without focal obstruction up to the level of bilateral popliteals.  · Below knee vessels revealed similar findings with non occlusive calcified plaque in the peroneal and anterior tibial arteries with flow demonstrated to the level of ankles. Bilateral posterior tibial flow could not be detected and is suggestive of probable occlusion.  · Bilateral ABIs are  noncompressible due to vascular calcifications.      Results for orders placed during the hospital encounter of 08/21/22    Adult Transthoracic Echo Complete W/ Cont if Necessary Per Protocol    Interpretation Summary  · Estimated left ventricular EF = 55%  · Mild to moderate mitral valve regurgitation is present.      Plan for Follow-up of Pending Labs/Results:     Discharge Details        Discharge Medications      New Medications      Instructions Start Date   gabapentin 100 MG capsule  Commonly known as: NEURONTIN   100 mg, Oral, 3 Times Daily      pantoprazole 40 MG EC tablet  Commonly known as: PROTONIX   40 mg, Oral, 2 Times Daily Before Meals      polyethylene glycol 17 GM/SCOOP powder  Commonly known as: MIRALAX   17 g, Oral, Daily         Changes to Medications      Instructions Start Date   atorvastatin 40 MG tablet  Commonly known as: LIPITOR  What changed: when to take this   40 mg, Oral, Daily      bisacodyl 10 MG suppository  Commonly known as: DULCOLAX  What changed: when to take this   10 mg, Rectal, Every 72 Hours PRN      sennosides-docusate 8.6-50 MG per tablet  Commonly known as: PERICOLACE  What changed: when to take this   1 tablet, Oral, Nightly         Continue These Medications      Instructions Start Date   acetaminophen 325 MG tablet  Commonly known as: TYLENOL   650 mg, Oral, Every 6 Hours PRN      amoxicillin-clavulanate 875-125 MG per tablet  Commonly known as: AUGMENTIN   1 tablet, Oral, Every 24 Hours      bumetanide 0.5 MG tablet  Commonly known as: BUMEX   0.5 mg, Oral, Daily      clopidogrel 75 MG tablet  Commonly known as: PLAVIX   75 mg, Oral, Daily      diphenhydrAMINE 25 mg capsule  Commonly known as: BENADRYL   25 mg, Oral, Every 6 Hours PRN, Do not take at same time as potassium      Eliquis 2.5 MG tablet tablet  Generic drug: apixaban   2.5 mg, Oral, 2 Times Daily      guaiFENesin 600 MG 12 hr tablet  Commonly known as: MUCINEX   1,200 mg, Oral, 2 Times Daily       hydrALAZINE 25 MG tablet  Commonly known as: APRESOLINE   25 mg, Oral, Every 8 Hours Scheduled      insulin glargine 100 UNIT/ML injection  Commonly known as: Lantus   15 Units, Subcutaneous, Nightly      lactobacillus acidophilus capsule capsule   1 capsule, Oral, Daily      metoprolol tartrate 25 MG tablet  Commonly known as: LOPRESSOR   Take 0.5 (one-half) tablet by mouth Every 12 (Twelve) Hours.      ondansetron ODT 4 MG disintegrating tablet  Commonly known as: ZOFRAN-ODT   4 mg, Translingual, Every 8 Hours PRN      vitamin B-12 100 MCG tablet  Commonly known as: CYANOCOBALAMIN   1 tablet, Oral, Daily, OTC      Vitamin D3 Super Strength 50 MCG (2000 UT) tablet  Generic drug: Cholecalciferol   2,000 Units, Oral, Daily, OTC         Stop These Medications    aspirin 81 MG EC tablet     famotidine 20 MG tablet  Commonly known as: PEPCID        ASK your doctor about these medications      Instructions Start Date   insulin lispro 100 UNIT/ML injection  Commonly known as: HumaLOG   12 Units, Subcutaneous, 3 Times Daily Before Meals             Allergies   Allergen Reactions   • Baclofen Other (See Comments) and Hallucinations     PSYCHOSIS-POA REFUSES ADMINISTRATION OF THIS MED.   • Cephalexin Nausea Only   • Erythromycin Base Nausea Only   • Melatonin Other (See Comments)     Nightmares   • Oxycodone Nausea Only   • Sulfa Antibiotics Nausea Only         Discharge Disposition:  Home or Self Care    Diet:  Hospital:  Diet Order   Procedures   • Diet Regular; Consistent Carbohydrate, Cardiac       Activity:  Activity Instructions     Activity as Tolerated            Restrictions or Other Recommendations:         CODE STATUS:    Code Status and Medical Interventions:   Ordered at: 10/06/22 1839     Medical Intervention Limits:    NO intubation (DNI)    NO cardioversion     Level Of Support Discussed With:    Patient     Code Status (Patient has no pulse and is not breathing):    No CPR (Do Not Attempt to Resuscitate)      Medical Interventions (Patient has pulse or is breathing):    Limited Support       Future Appointments   Date Time Provider Department Center   10/20/2022  2:00 PM Isidra Basilio PT [facility]   11/2/2022 12:15 PM Valdez Finney MD [facility]       Additional Instructions for the Follow-ups that You Need to Schedule     Discharge Follow-up with PCP   As directed       Currently Documented PCP:    Arleen Doherty MD    PCP Phone Number:    [phone]     Follow Up Details: 1 week         Discharge Follow-up with Specified Provider: Dr. Ceballos as scheduled in 2 weeks   As directed      To: Dr. Ceballos as scheduled in 2 weeks         Discharge Follow-up with Specified Provider: Dr. Blunt 2-4 weeks   As directed      To: Dr. Blunt 2-4 weeks                     INDIGO Fernandez  10/12/22      Time Spent on Discharge:  I spent  45 minutes on this discharge activity which included: face-to-face encounter with the patient, reviewing the data in the system, coordination of the care with the nursing staff as well as consultants, documentation, and entering orders.        Electronically signed by INDIGO Fernandez, 10/12/22, 12:39 PM EDT.

## 2022-10-12 NOTE — PLAN OF CARE
Goal Outcome Evaluation:  Plan of Care Reviewed With: patient, daughter        Progress: improving  Outcome Evaluation: patient alert and oriented at discharge, VSS, hgb/hct remains stable. Discharge education and follow ups reviewed with patient and family at bedside, dcd home with family

## 2022-10-12 NOTE — PLAN OF CARE
Goal Outcome Evaluation:  Plan of Care Reviewed With: patient           Outcome Evaluation: VSS. RA. 2L NC a night stats 95. No complains of foot pain or pain in general. H&H 8.3 and 25.0. NSR on monior. Will coninue plan of care.

## 2022-10-12 NOTE — CASE MANAGEMENT/SOCIAL WORK
Continued Stay Note  [facility]     Patient Name: Susan Anderson  MRN: [id number]  Today's Date: 10/12/2022    Admit Date: 10/6/2022    Plan: Home with family   Discharge Plan     Row Name 10/12/22 8937       Plan    Plan Home with family    Patient/Family in Agreement with Plan yes    Plan Comments I called  outpatient wound and put in a new ordered for them to see her outpatient. They will be calling patient with an appointment.    Final Discharge Disposition Code 01 - home or self-care    Row Name 10/12/22 135       Plan    Plan Home with family support.    Patient/Family in Agreement with Plan yes    Final Discharge Disposition Code 01 - home or self-care    Final Note Spoke with patient and family member at bedside. Plan is home with family support. Patient denieds any d.c. needs. Family to transport home.               Discharge Codes    No documentation.               Expected Discharge Date and Time     Expected Discharge Date Expected Discharge Time    Oct 12, 2022             Karis Barrera RN

## 2022-10-13 ENCOUNTER — TRANSITIONAL CARE MANAGEMENT TELEPHONE ENCOUNTER (OUTPATIENT)
Dept: CALL CENTER | Facility: HOSPITAL | Age: 82
End: 2022-10-13

## 2022-10-13 NOTE — OUTREACH NOTE
Prep Survey    Flowsheet Row Responses   [facility] facility patient discharged from? [place]   Is LACE score < 7 ? No   Emergency Room discharge w/ pulse ox? No   Eligibility [facility]   Date of Admission 10/06/22   Date of Discharge 10/12/22   Discharge Disposition Home or Self Care   Discharge diagnosis Gastrointestinal bleeding   Does the patient have one of the following disease processes/diagnoses(primary or secondary)? Other   Does the patient have Home health ordered? No   Is there a DME ordered? No   Prep survey completed? Yes          MELISSA AKBAR - Registered Nurse

## 2022-10-13 NOTE — OUTREACH NOTE
Call Center TCM Note    Flowsheet Row Responses   [facility] patient discharged from? [place]   Does the patient have one of the following disease processes/diagnoses(primary or secondary)? Other   TCM attempt successful? Yes   Call start time 0934   Call end time 0935   Discharge diagnosis Gastrointestinal bleeding   Is patient permission given to speak with other caregiver? Yes   List who call center can speak with daughter- Katie   Person spoke with today (if not patient) and relationship daughter   Does the patient have all medications ordered at discharge? Yes   Is the patient taking all medications as directed (includes completed medication regime)? Yes   Comments hospital f/u with PCP on 10/20   Has home health visited the patient within 72 hours of discharge? N/A   Psychosocial issues? No   Did the patient receive a copy of their discharge instructions? Yes   What is the patient's perception of their health status since discharge? Improving   Is the patient/caregiver able to teach back the hierarchy of who to call/visit for symptoms/problems? PCP, Specialist, Home health nurse, Urgent Care, ED, 911 Yes   TCM call completed? Yes   Wrap up additional comments Per daughter, patient is doing well, confirmed upcoming appt with PCP for 10/20.          Yulia Munson RN    10/13/2022, 09:35 EDT

## 2022-10-14 ENCOUNTER — HOSPITAL ENCOUNTER (EMERGENCY)
Facility: HOSPITAL | Age: 82
Discharge: HOME | End: 2022-10-14
Attending: EMERGENCY MEDICINE | Admitting: EMERGENCY MEDICINE

## 2022-10-14 VITALS
OXYGEN SATURATION: 100 % | BODY MASS INDEX: 25.83 KG/M2 | WEIGHT: 155 LBS | DIASTOLIC BLOOD PRESSURE: 50 MMHG | HEART RATE: 60 BPM | HEIGHT: 65 IN | TEMPERATURE: 97.8 F | RESPIRATION RATE: 16 BRPM | SYSTOLIC BLOOD PRESSURE: 130 MMHG

## 2022-10-14 DIAGNOSIS — S41.101A ARM WOUND, RIGHT, INITIAL ENCOUNTER: ICD-10-CM

## 2022-10-14 DIAGNOSIS — I80.8 PHLEBITIS OF RIGHT ARM: Primary | ICD-10-CM

## 2022-10-14 PROCEDURE — 99282 EMERGENCY DEPT VISIT SF MDM: CPT

## 2022-10-14 RX ORDER — CLINDAMYCIN HYDROCHLORIDE 300 MG/1
300 CAPSULE ORAL 3 TIMES DAILY
Qty: 21 CAPSULE | Refills: 0 | Status: SHIPPED | OUTPATIENT
Start: 2022-10-14 | End: 2022-10-21

## 2022-10-14 RX ORDER — SODIUM CHLORIDE 0.9 % (FLUSH) 0.9 %
10 SYRINGE (ML) INJECTION AS NEEDED
Status: DISCONTINUED | OUTPATIENT
Start: 2022-10-14 | End: 2022-10-14 | Stop reason: HOSPADM

## 2022-10-14 NOTE — ED PROVIDER NOTES
Subjective   History of Present Illness  82-year-old female who presents for evaluation of wound to the right antecubital fossa.  The patient previously had an IV in her right AC and was recently discharged from the hospital Wednesday.  Patient was admitted for low hemoglobin received blood products at that given time.  She had an outpatient follow-up for lab draw today and when she was evaluated at [facility] they were concerned that the right antecubital fossa was infected so the patient was sent to the ER for further work-up.  The patient denies systemic symptoms of infection inclueding no fever, body aches, or chills.  Patient denies cough or shortness of breath.  No abdominal pain.  No reported change in bowel or urinary function.  She does have a previous history of MRSA.  There is very subtle redness to the right AC with minimal to no pain.  No other acute complaints.        Review of Systems   Constitutional: Negative for chills, fatigue and fever.   HENT: Negative for congestion, ear pain, postnasal drip, sinus pressure and sore throat.    Eyes: Negative for pain, redness and visual disturbance.   Respiratory: Negative for cough, chest tightness and shortness of breath.    Cardiovascular: Negative for chest pain, palpitations and leg swelling.   Gastrointestinal: Negative for abdominal pain, anal bleeding, blood in stool, diarrhea, nausea and vomiting.   Endocrine: Negative for polydipsia and polyuria.   Genitourinary: Negative for difficulty urinating, dysuria, frequency and urgency.   Musculoskeletal: Negative for arthralgias, back pain and neck pain.   Skin: Positive for wound. Negative for pallor and rash.   Allergic/Immunologic: Negative for environmental allergies and immunocompromised state.   Neurological: Negative for dizziness, weakness and headaches.   Hematological: Negative for adenopathy.   Psychiatric/Behavioral: Negative for confusion, self-injury and suicidal ideas. The patient is not  nervous/anxious.    All other systems reviewed and are negative.      Past Medical History:   Diagnosis Date   • Arthritis    • Asthma 6/4 - double pneumonia    Currently on inhaler and nebulizer   • Atrial fibrillation (HCC) 8/21/2022   • Cancer (HCC)     cervical cancer, skin cancer   • CHF (congestive heart failure) (HCC) June 4, 2021   • Chronic kidney disease Related to diabetes   • Coronary artery disease 6/4/2021 DX for hear failure   • Diabetes mellitus (HCC) 30 years    Seeing Dr. Lam 1st time Aug 19   • Gout    • Hx of colonoscopy    • Hyperlipidemia Reference current labs x 2-3yrs approx   • Hypertension 30 years   • Migraine    • Mitral valve disease    • Mitral valve disease    • Osteomyelitis (HCC)    • Peripheral neuropathy    • Sleep apnea    • Type 2 diabetes mellitus (HCC)     30 years       Allergies   Allergen Reactions   • Baclofen Other (See Comments) and Hallucinations     PSYCHOSIS-POA REFUSES ADMINISTRATION OF THIS MED.   • Cephalexin Nausea Only   • Erythromycin Base Nausea Only   • Melatonin Other (See Comments)     Nightmares   • Oxycodone Nausea Only   • Sulfa Antibiotics Nausea Only       Past Surgical History:   Procedure Laterality Date   • ABDOMINAL HYSTERECTOMY W/SALPINGECTOMY     • AMPUTATION  Right great toe, 1st 1/3 metatarsal -[place] 4/14/21   • AORTAGRAM N/A 4/9/2021    Procedure: AORTAGRAM WITH OR WITHOUT RUNOFFS, WITH Co2;  Surgeon: Trey Forrest MD;  Location: [facility];  Service: Vascular;  Laterality: N/A;   • AORTAGRAM N/A 9/28/2022    Procedure: CO2 ANGIOGRAM, LEFT SFA ANGIOPLASTY WITH DRUG ELUTING BALLOON, LEFT SFA STENT PLACEMENT ;  Surgeon: Trey Forrest MD;  Location: [facility];  Service: Vascular;  Laterality: N/A;  FLUORO: 13.12  DOSE 162mGy  CONTRAST: Isovue 350: 15ml   • APPENDECTOMY     • BRAIN TUMOR EXCISION      laser surgery    • CARDIAC CATHETERIZATION N/A 6/21/2021    Procedure: LEFT HEART CATH;  Surgeon: Anjum Ceballos MD;  Location:   [facility];  Service: Cardiology;  Laterality: N/A;   • CATARACT EXTRACTION W/ INTRAOCULAR LENS  IMPLANT, BILATERAL     • CHOLECYSTECTOMY     • ENDOSCOPY N/A 10/7/2022    Procedure: ESOPHAGOGASTRODUODENOSCOPY;  Surgeon: Stef Veras MD;  Location:  [facility];  Service: Gastroenterology;  Laterality: N/A;   • EYE SURGERY     • HYSTERECTOMY     • TOE SURGERY     • TRANS METATARSAL AMPUTATION Right 4/14/2021    Procedure: AMPUTATION TRANS METATARSAL RIGHT GREAT TOE;  Surgeon: Valdez Finney MD;  Location:  [facility];  Service: Vascular;  Laterality: Right;       Family History   Problem Relation Age of Onset   • Diabetes Mother         Type II   • Heart disease Father    • Heart attack Father    • Coronary artery disease Father    • Diabetes Father         Type II   • Diabetes Sister    • Diabetes Maternal Grandmother    • Diabetes Maternal Grandfather    • Diabetes Paternal Grandmother    • Diabetes Paternal Grandfather        Social History     Socioeconomic History   • Marital status:    • Number of children: 1   Tobacco Use   • Smoking status: Never   • Smokeless tobacco: Never   Vaping Use   • Vaping Use: Never used   Substance and Sexual Activity   • Alcohol use: Yes     Comment: Social drinking when not recovering from hospitalization   • Drug use: Never   • Sexual activity: Not Currently     Birth control/protection: None           Objective   Physical Exam  Vitals and nursing note reviewed.   Constitutional:       General: She is not in acute distress.     Appearance: Normal appearance. She is well-developed. She is not toxic-appearing or diaphoretic.   HENT:      Head: Normocephalic and atraumatic.      Right Ear: External ear normal.      Left Ear: External ear normal.      Nose: Nose normal.   Eyes:      General: Lids are normal.      Pupils: Pupils are equal, round, and reactive to light.   Neck:      Trachea: No tracheal deviation.   Cardiovascular:      Rate and Rhythm:  Normal rate and regular rhythm.      Pulses: No decreased pulses.      Heart sounds: Normal heart sounds. No murmur heard.    No friction rub. No gallop.   Pulmonary:      Effort: Pulmonary effort is normal. No respiratory distress.      Breath sounds: Normal breath sounds. No decreased breath sounds, wheezing, rhonchi or rales.   Abdominal:      General: Bowel sounds are normal.      Palpations: Abdomen is soft.      Tenderness: There is no abdominal tenderness. There is no guarding or rebound.   Musculoskeletal:         General: No deformity. Normal range of motion.      Cervical back: Normal range of motion and neck supple.   Lymphadenopathy:      Cervical: No cervical adenopathy.   Skin:     General: Skin is warm and dry.      Findings: Wound present. No rash.          Neurological:      Mental Status: She is alert and oriented to person, place, and time.      Cranial Nerves: No cranial nerve deficit.      Sensory: No sensory deficit.   Psychiatric:         Speech: Speech normal.         Behavior: Behavior normal.         Thought Content: Thought content normal.         Judgment: Judgment normal.         Procedures           ED Course                                           MDM  Number of Diagnoses or Management Options  Arm wound, right, initial encounter: new and requires workup  Phlebitis of right arm: new and requires workup  Diagnosis management comments: The wound most likely is inflammatory in my opinion.  No systemic symptoms of infection.  I will cover with clindamycin given the patient's previous history of MRSA.    The patient already has an outpatient follow-up scheduled with infectious disease, Dr. Pete.    Advised to return to the ER with any further concern.       Amount and/or Complexity of Data Reviewed  Obtain history from someone other than the patient: yes  Review and summarize past medical records: yes  Independent visualization of images, tracings, or specimens: yes        Final  diagnoses:   Phlebitis of right arm   Arm wound, right, initial encounter       ED Disposition  ED Disposition     ED Disposition   Discharge    Condition   Stable    Comment   --             Arleen Doherty MD  [address]   [facility] [address] [phone]    In 1 week      Stef Pete MD  [address]  [facility] [address]  [place] [id number]  [phone]    Schedule an appointment as soon as possible for a visit            Medication List      New Prescriptions    clindamycin 300 MG capsule  Commonly known as: CLEOCIN  Take 1 capsule by mouth 3 (Three) Times a Day for 7 days.        Changed    atorvastatin 40 MG tablet  Commonly known as: LIPITOR  Take 1 tablet by mouth Daily.  What changed: when to take this     insulin lispro 100 UNIT/ML injection  Commonly known as: HumaLOG  Inject 12 Units under the skin into the appropriate area as directed 3 (Three) Times a Day Before Meals.  What changed:   · when to take this  · additional instructions     sennosides-docusate 8.6-50 MG per tablet  Commonly known as: PERICOLACE  Take 1 tablet by mouth Every Night.  What changed: when to take this           Where to Get Your Medications      These medications were sent to [facility] - [place], [place] - [address] - [phone]  - [phone]   [address], [facility] [address]    Phone: [phone]   · clindamycin 300 MG capsule          Vivi Torrez MD  10/14/22 5115

## 2022-10-14 NOTE — DISCHARGE INSTRUCTIONS
Keep the wound clean by lightly scrubbing with soap and water.    Take antibiotics as prescribed.    Follow-up with infectious disease as scheduled for further outpatient evaluation.

## 2022-10-16 LAB
ACT BLD: 190 SECONDS (ref 82–152)
ACT BLD: 225 SECONDS (ref 82–152)
ACT BLD: 231 SECONDS (ref 82–152)

## 2022-10-19 ENCOUNTER — READMISSION MANAGEMENT (OUTPATIENT)
Dept: CALL CENTER | Facility: HOSPITAL | Age: 82
End: 2022-10-19

## 2022-10-19 ENCOUNTER — PREP FOR SURGERY (OUTPATIENT)
Dept: OTHER | Facility: HOSPITAL | Age: 82
End: 2022-10-19

## 2022-10-19 DIAGNOSIS — K22.10 ULCER OF ESOPHAGUS WITHOUT BLEEDING: Primary | ICD-10-CM

## 2022-10-19 NOTE — OUTREACH NOTE
Medical Week 2 Survey    Flowsheet Row Responses   [facility] patient discharged from? [place]   Does the patient have one of the following disease processes/diagnoses(primary or secondary)? Other  [ER on 10/14/22]   Week 2 attempt successful? Yes   Call start time 1452   Discharge diagnosis Gastrointestinal bleeding   Call end time 1454   Person spoke with today (if not patient) and relationship Caregiver- Karissa   Bharats reviewed with patient/caregiver? Yes   Is the patient taking all medications as directed (includes completed medication regime)? Yes   Does the patient have a primary care provider?  Yes   Has the patient kept scheduled appointments due by today? Yes   Has home health visited the patient within 72 hours of discharge? N/A   Psychosocial issues? No   Did the patient receive a copy of their discharge instructions? Yes   Nursing interventions Reviewed instructions with patient   What is the patient's perception of their health status since discharge? Improving   Is the patient/caregiver able to teach back signs and symptoms related to disease process for when to call PCP? Yes   Is the patient/caregiver able to teach back signs and symptoms related to disease process for when to call 911? Yes   Is the patient/caregiver able to teach back the hierarchy of who to call/visit for symptoms/problems? PCP, Specialist, Home health nurse, Urgent Care, ED, 911 Yes   Week 2 Call Completed? Yes   Wrap up additional comments Per care giver Pt is doing well and is getting stronger.           FANG VIGIL - Registered Nurse

## 2022-10-20 ENCOUNTER — TREATMENT (OUTPATIENT)
Dept: PHYSICAL THERAPY | Facility: CLINIC | Age: 82
End: 2022-10-20

## 2022-10-20 ENCOUNTER — HOSPITAL ENCOUNTER (OUTPATIENT)
Dept: PHYSICAL THERAPY | Facility: HOSPITAL | Age: 82
Setting detail: THERAPIES SERIES
Discharge: HOME | End: 2022-10-20

## 2022-10-20 ENCOUNTER — PATIENT MESSAGE (OUTPATIENT)
Dept: CARDIOLOGY | Facility: CLINIC | Age: 82
End: 2022-10-20

## 2022-10-20 DIAGNOSIS — S91.302D OPEN WOUND OF LEFT FOOT, SUBSEQUENT ENCOUNTER: Primary | ICD-10-CM

## 2022-10-20 DIAGNOSIS — Z74.1 DEPENDENT FOR MOBILITY: Primary | ICD-10-CM

## 2022-10-20 DIAGNOSIS — R53.1 WEAKNESS GENERALIZED: ICD-10-CM

## 2022-10-20 PROCEDURE — 97116 GAIT TRAINING THERAPY: CPT | Performed by: PHYSICAL THERAPIST

## 2022-10-20 PROCEDURE — 97110 THERAPEUTIC EXERCISES: CPT | Performed by: PHYSICAL THERAPIST

## 2022-10-20 PROCEDURE — 97163 PT EVAL HIGH COMPLEX 45 MIN: CPT | Performed by: PHYSICAL THERAPIST

## 2022-10-20 PROCEDURE — 97597 DBRDMT OPN WND 1ST 20 CM/<: CPT

## 2022-10-20 RX ORDER — CLOPIDOGREL BISULFATE 75 MG/1
75 TABLET ORAL DAILY
Qty: 30 TABLET | Refills: 0 | Status: SHIPPED | OUTPATIENT
Start: 2022-10-20 | End: 2022-10-28 | Stop reason: SDUPTHER

## 2022-10-20 NOTE — PROGRESS NOTES
"  Physical Therapy Initial Evaluation and Plan of Care      Patient: Susan Anderson   : 1940  Diagnosis/ICD-10 Code:  The primary encounter diagnosis was Dependent for mobility. A diagnosis of Weakness generalized was also pertinent to this visit.   Referring practitioner: Arleen Doherty MD  Date of Initial Visit: Type: THERAPY  Noted: 10/20/2022  Today's Date: 10/20/2022  Patient seen for 1 sessions         Visit Diagnoses:    ICD-10-CM ICD-9-CM   1. Dependent for mobility  Z74.1 V60.89   2. Weakness generalized  R53.1 780.79       Subjective Questionnaire: LEFS:     Subjective Evaluation    History of Present Illness  Onset date: 2021.  Mechanism of injury: Pts daughter is with her and provided a summary of her medical history.  Pt had R great toe amputation in 2021.  She was NWB from April to 2021.  Prior to this, she had been ambulating with a cane.  Her daughter reports a \"cascade of events\" resulting in severe debilitation and limited mobility.  She had outpatient PT at [facility] through 2022.  Then in July of this year, patient fell in her bathroom and was on the floor for 10 hours.  She was hospitalized for observation and has since been in and out of the hospital for the past 80 days.  Her history is detailed in EMR.  She is currently receiving wound care for an open wound on dorsum of L foot.  She had L femoral artery stent placement 22.  She was released from hospital last week.  She has in-home caregivers and her daughter is there when caregivers are not.    Pt has hospital bed at home, accessible shower with shower seat and hand held shower head, she has a single level home with ramps at entrances.  She has a raised toilet seat with grab bars.    Subjective comment: severe debilitation following prolonged hospitalization, multiple medical medical problems  Patient Occupation: retired principal Pain  Location: hx LBP, neuropathic pain    Social " Support  Lives in: one-story house  Lives with: 24 hour caregivers in home.    Patient Goals  Patient goals for therapy: increased motion, increased strength and independence with ADLs/IADLs           Treatment  Exercise 1  Exercise Name 1: Initial HEP education  Exercise 2  Exercise Name 2: gait training with wheeled walker         Functional Testing  Functional Tests Options: Lower Extremity Functional Index   Objective          Strength/Myotome Testing     Left Hip   Planes of Motion   Flexion: 3-  Extension: 2  Abduction: 3-    Right Hip   Planes of Motion   Flexion: 3-  Extension: 2  Abduction: 3-    Left Knee   Flexion: 3+  Extension: 4-    Right Knee   Flexion: 3+  Extension: 4-    Left Ankle/Foot   Dorsiflexion: 3  Plantar flexion: 2+    Right Ankle/Foot   Dorsiflexion: 4-  Plantar flexion: 2+    Ambulation     Comments   Pt ambulates with wheeled walker and min to mod assist x 1, as well as daughter following with WC.  She ambulated 12' and 10' in clinic today, requiring encouragement due to anxiety.  She remains stooped forward and scoots her feet with very short, shuffled steps, pausing between each step.    Sit<>stand transfers require min-mod assist x 1 (pt uses lift chair or elevated hospital bed to assist at home)  Sit<>supine transfers requires mod-max assist x 1 (pt is accustomed to elevating bed and using bedrails)    Bed mobility today required mod-max assist (pt is fearful on narrow treatment table and is accustomed to using bed rails)          Assessment & Plan     Assessment  Impairments: abnormal gait, abnormal or restricted ROM, activity intolerance, impaired balance, impaired physical strength, lacks appropriate home exercise program and safety issue  Functional Limitations: walking, moving in bed, standing, stooping and unable to perform repetitive tasks  Assessment details: Pt presents with extreme debilitation and dependent mobility skills requiring 24 hour in-home care.  Daughter wants  "her to continue with outpatient PT vs home health because it will be good for her to get out of the house and will be more challenging.  Pt will require gradual progression of strengthening and conditioning as well as mobility skill training.  Prognosis: fair  Prognosis details: Anxiety and multiple medical complications may limit progress in PT.    Goals  Plan Goals: 4 weeks  1)Pt. demonstrates independence and compliance with HEP.  2)Pt. tolerates ambulation >/= 50 feet with wheeled walker and contact guard assist + verbal cues.  3)Pt tolerates standing for >/= 5' to improve ADL's such as brushing teeth.  4)Pt demonstrates independent rolling and scooting for improvement in bed mobility skills.  5)Pt transfers sit<>stand safely from surfaces </= 20\" high.  6)Functional outcome measure(LEFS)is improved by 10 points, indicating improving functional abilities.    8 weeks  1)Pt. demonstrates independence in advanced HEP for ongoing improvement.  2)Pt demonstrates safe ambulation with wheeled walker >/= 150 feet.  3)Pt tolerates standing for >/= 10 minutes for improving independence in household activities.  4)LE strength is improved by 1/2 grade or more in all major muscle groups for increasing functional abilities.  5)Functional outcome measure (LEFS) is improved to 30/80 or better, indicating improving functional abilities.          Plan  Therapy options: will be seen for skilled therapy services  Planned modality interventions: thermotherapy (hydrocollator packs)  Planned therapy interventions: abdominal trunk stabilization, balance/weight-bearing training, neuromuscular re-education, functional ROM exercises, gait training, home exercise program, transfer training, therapeutic activities, stretching, strengthening, postural training and flexibility  Frequency: 2x week  Duration in weeks: 8  Treatment plan discussed with: patient and family  Plan details: PT 2x/week for 8 weeks, addressing deficits/dependence in " bed mobility, transfers, gait, and strength/endurance limitations.        Timed:  Manual Therapy:         mins  91886;  Therapeutic Exercise:    15     mins  98076;     Neuromuscular Fernando:        mins  45247;    Therapeutic Activity:          mins  74693;     Gait Training:   10        mins  95959;     Ultrasound:          mins  11382;    Electrical Stimulation:         mins  89451 ( );  Iontophoresis  ___ mins   13713    Untimed:  Electrical Stimulation:         mins  43626 ( );  Mechanical Traction:         mins  41853;     Timed Treatment:   25   mins   Total Treatment:     60   mins    PT SIGNATURE: Peyton Gayle PT   Electronically signed  DATE TREATMENT INITIATED: 10/20/2022    Initial Certification  Certification Period: 10/20/3054tsqw8u1/17/2023  I certify that the therapy services are furnished while this patient is under my care.  The services outlined above are required by this patient, and will be reviewed every 90 days.    Physician Signature:_____________________________________________             PHYSICIAN: Arleen Doherty MD  NPI: [id number]                                      DATE:       Please sign and return via fax to [phone].. Thank you, [facility].

## 2022-10-20 NOTE — THERAPY PROGRESS REPORT/RE-CERT
Outpatient Rehabilitation - Wound/Debridement Progress Note   [facility]     Patient Name: Susan Anderson  : 1940  MRN: [id number]  Today's Date: 10/20/2022                 Admit Date: 10/20/2022    Visit Dx:    ICD-10-CM ICD-9-CM   1. Open wound of left foot, subsequent encounter  S91.302D V58.89     892.0       Patient Active Problem List   Diagnosis   • Diabetic foot ulcer ([facility])   • PVD (peripheral vascular disease) ([facility])   • Osteomyelitis ([facility])   • Diabetes mellitus with neuropathy ([facility])   • Essential hypertension   • Stage 3a chronic kidney disease ([facility])   • Pyogenic inflammation of bone ([facility])   • Hyperglycemia   • Pneumonia due to infectious organism   • Mitral valve disease   • NICM (nonischemic cardiomyopathy) ([facility])   • Coronary artery disease involving native coronary artery of native heart without angina pectoris   • Dyslipidemia   • Chronic combined systolic and diastolic heart failure ([facility])   • Lumbar stenosis with neurogenic claudication   • Spondylosis of lumbar region without myelopathy or radiculopathy   • Spondylolisthesis, lumbar region   • Degeneration of lumbar or lumbosacral intervertebral disc   • Gait disturbance   • Physical deconditioning   • Sarcopenia   • Weakness   • Anemia, chronic disease   • Fall   • Dizziness   • Demand ischemia ([facility])   • Effusion of right knee   • Right knee pain   • Pressure injury of skin of sacral region   • Sleep apnea   • GIB (gastrointestinal bleeding)   • Vasovagal syncope   • Hypomagnesemia   • Syncope, unspecified syncope type   • Paroxysmal atrial fibrillation ([facility])        Past Medical History:   Diagnosis Date   • Arthritis    • Asthma  - double pneumonia    Currently on inhaler and nebulizer   • Atrial fibrillation ([facility]) 2022   • Cancer ([facility])     cervical cancer, skin cancer   • CHF (congestive heart failure) ([facility]) 2021   • Chronic kidney disease Related to diabetes   • Coronary artery disease 2021 DX for hear failure   •  Diabetes mellitus (HCC) 30 years    Seeing Dr. Lam 1st time Aug 19   • Gout    • Hx of colonoscopy    • Hyperlipidemia Reference current labs x 2-3yrs approx   • Hypertension 30 years   • Migraine    • Mitral valve disease    • Mitral valve disease    • Osteomyelitis (HCC)    • Peripheral neuropathy    • Sleep apnea    • Type 2 diabetes mellitus (HCC)     30 years        Past Surgical History:   Procedure Laterality Date   • ABDOMINAL HYSTERECTOMY W/SALPINGECTOMY     • AMPUTATION  Right great toe, 1st 1/3 metatarsal -Reg 4/14/21   • AORTAGRAM N/A 4/9/2021    Procedure: AORTAGRAM WITH OR WITHOUT RUNOFFS, WITH Co2;  Surgeon: Trey Forrest MD;  Location:  [facility];  Service: Vascular;  Laterality: N/A;   • AORTAGRAM N/A 9/28/2022    Procedure: CO2 ANGIOGRAM, LEFT SFA ANGIOPLASTY WITH DRUG ELUTING BALLOON, LEFT SFA STENT PLACEMENT ;  Surgeon: Trey Forrest MD;  Location:  [facility];  Service: Vascular;  Laterality: N/A;  FLUORO: 13.12  DOSE 162mGy  CONTRAST: Isovue 350: 15ml   • APPENDECTOMY     • BRAIN TUMOR EXCISION      laser surgery    • CARDIAC CATHETERIZATION N/A 6/21/2021    Procedure: LEFT HEART CATH;  Surgeon: Anjum Ceballos MD;  Location:  [facility] CATH INVASIVE LOCATION;  Service: Cardiology;  Laterality: N/A;   • CATARACT EXTRACTION W/ INTRAOCULAR LENS  IMPLANT, BILATERAL     • CHOLECYSTECTOMY     • ENDOSCOPY N/A 10/7/2022    Procedure: ESOPHAGOGASTRODUODENOSCOPY;  Surgeon: Stef Veras MD;  Location:  [facility];  Service: Gastroenterology;  Laterality: N/A;   • EYE SURGERY     • HYSTERECTOMY     • TOE SURGERY     • TRANS METATARSAL AMPUTATION Right 4/14/2021    Procedure: AMPUTATION TRANS METATARSAL RIGHT GREAT TOE;  Surgeon: Valdez Finney MD;  Location:  [facility] OR;  Service: Vascular;  Laterality: Right;         EVALUATION   PT Ortho     Row Name 10/20/22 1000       Subjective Comments    Subjective Comments Dtr reports she's been changing the dressing every two days. She  "thinks the area is doing better than it was. They're anticipating a Watchman cardiac procedure due to issues with her blood thinners.  -       Subjective Pain    Able to rate subjective pain? yes  -MC    Pre-Treatment Pain Level 0  -MC    Post-Treatment Pain Level 0  -    Subjective Pain Comment DPN  -MC       Transfers    Comment, (Transfers) remained seated for tx  -          User Key  (r) = Recorded By, (t) = Taken By, (c) = Cosigned By    Initials Name Provider Type    Lucila Mohan PT Physical Therapist                 LDA Wound     Row Name 10/20/22 1000             Wound 10/08/22 1400 Left anterior foot Arterial Ulcer    Wound - Properties Group Placement Date: 10/08/22  - Placement Time: 1400  - Side: Left  - Orientation: anterior  - Location: foot  -MF Primary Wound Type: Arterial ulc  -    Wound Image Images linked: 1  -      Dressing Appearance intact;moist drainage  -      Base moist;pink;red;yellow;slough  -      Periwound intact;dry;redness;warm  -      Periwound Temperature warm  -      Periwound Skin Turgor soft  -      Edges open;rolled/closed  mild epibole starting distal edges  -      Wound Length (cm) 1.9 cm  -      Wound Width (cm) 2.4 cm  -      Wound Depth (cm) 0.3 cm  -      Wound Surface Area (cm^2) 4.56 cm^2  -      Wound Volume (cm^3) 1.368 cm^3  -      Drainage Characteristics/Odor serosanguineous  -      Drainage Amount small  -      Care, Wound cleansed with;wound cleanser;debrided  -      Dressing Care dressing applied;silver impregnated;collagen;antimicrobial agent applied;foam;low-adherent;border dressing  carolee, saline-moist HFBc, 4\" optifoam  -      Periwound Care cleansed with pH balanced cleanser;dry periwound area maintained  -      Retired Wound - Properties Group Placement Date: 10/08/22  - Placement Time: 1400  -MF Side: Left  - Orientation: anterior  - Location: foot  -MF Primary Wound Type: Arterial ulc  - "    Retired Wound - Properties Group Date first assessed: 10/08/22  - Time first assessed: 1400  -MF Side: Left  - Location: foot  -MF Primary Wound Type: Arterial ulc  -          User Key  (r) = Recorded By, (t) = Taken By, (c) = Cosigned By    Initials Name Provider Type     Avtar Ayala, PT Physical Therapist    Lucila Mohan, PT Physical Therapist                  WOUND DEBRIDEMENT  Total area of Debridement: 3 cm2  Debridement Site 1  Location- Site 1: L foot wound  Selective Debridement- Site 1: Wound Surface <20cmsq  Instruments- Site 1: #15, scapel  Excised Tissue Description- Site 1: moderate, slough  Bleeding- Site 1: scant, held pressure, 1 minute              Therapy Education     Row Name 10/20/22 1000             Therapy Education    Education Details Continue dressing changes every 2-3 days. add carolee collagen to the wound first to promote additional healing.  -      Given Symptoms/condition management;Bandaging/dressing change  -      Program Reinforced;Modified  -      How Provided Verbal;Demonstration  -MC      Provided to Patient;Caregiver  daughter  -      Level of Understanding Verbalized;Teach back education performed  -            User Key  (r) = Recorded By, (t) = Taken By, (c) = Cosigned By    Initials Name Provider Type    Lucila Mohan, PT Physical Therapist                Recommendation and Plan   PT Assessment/Plan     Row Name 10/20/22 1000          PT Assessment    Functional Limitations Performance in self-care ADL;Impaired gait;Other (comment)  wound management limitations  -     Impairments Impaired arterial circulation;Integumentary integrity;Pain;Impaired sensory integrity  -     Assessment Comments Pt returns after OP hospitalization to resume care for the L anterior foot arterial ulcer. The wound is much improved since last assessment, with increased granulation tissue exposure and new epithelialization at the proximal edge. Some  epibole noted along the distal edges that may need to be addressed soon to prevent complications with epithelialization. Pt has met two of her goals for PT wound care and is progressing toward her remaining goals. Pt will continue to benefit from skilled PT wound care to promote healing.  -     Rehab Potential Fair  -     Patient/caregiver participated in establishment of treatment plan and goals Yes  -     Patient would benefit from skilled therapy intervention Yes  -        PT Plan    PT Frequency 1x/week  -     Predicted Duration of Therapy Intervention (PT) 20 visits  -     Planned CPT's? PT SELF CARE/MGMT/TRAIN 15 MIN: 88508;PT NONSELECT DEBRIDE 15 MIN: 57498;PT PIPE DEBRIDE OPEN WOUND UP TO 20 CM: 29340;PT THER SUPP EA 15 MIN;PT NLFU MIST: 68516  -     Physical Therapy Interventions (Optional Details) wound care;patient/family education  -     PT Plan Comments debridement, MIST if indicated  -           User Key  (r) = Recorded By, (t) = Taken By, (c) = Cosigned By    Initials Name Provider Type    Lucila Mohan, PT Physical Therapist                Goals   PT OP Goals     Row Name 10/20/22 1056 10/20/22 1000       PT Short Term Goals    STG 1 -- Reduce wound dimensions by 25% as evidence of wound healing.  -    STG 1 Progress -- Ongoing  -    STG 2 -- Increase granulation formation to 25% of wound bed or greater, to promote wound closure.  -    STG 2 Progress -- Met  -       Long Term Goals    LTG 1 -- Pt/family independent with home dressing changes.  -    LTG 1 Progress -- Met  -    LTG 2 -- L foot wound to be closed/resurfaced to allow for full indepenent function.  -    LTG 2 Progress -- Ongoing  -       Time Calculation    PT Goal Re-Cert Due Date 01/01/23  - 01/01/23  -          User Key  (r) = Recorded By, (t) = Taken By, (c) = Cosigned By    Initials Name Provider Type    Lucila Mohan PT Physical Therapist                PT Goal Re-Cert Due Date:  01/01/23  PT Short Term Goals  STG 1: Reduce wound dimensions by 25% as evidence of wound healing.  STG 1 Progress: Ongoing  STG 2: Increase granulation formation to 25% of wound bed or greater, to promote wound closure.  STG 2 Progress: Met  Long Term Goals  LTG 1: Pt/family independent with home dressing changes.  LTG 1 Progress: Met  LTG 2: L foot wound to be closed/resurfaced to allow for full indepenent function.  LTG 2 Progress: Ongoing      Time Calculation: Start Time: 1015  Untimed Charges  12840-Nvjwckyvv debridement: 25  Total Minutes  Untimed Charges Total Minutes: 25   Total Minutes: 25  Therapy Charges for Today     Code Description Service Date Service Provider Modifiers Qty    13523960021 HC PIPE DEBRIDE OPEN WOUND UP TO 20CM 10/20/2022 Lucila Ulrich, PT GP 1                  Lucila Ulrich, PT  10/20/2022

## 2022-10-20 NOTE — TELEPHONE ENCOUNTER
From: Susan Anderson  To: Anjum Ceballos MD  Sent: 10/20/2022 1:06 PM EDT  Subject: Refills Needed and EP appointment    Hello,    We inventoried remaining meds from recent hospitalizations and need refills. The only one I have plenty of is atorvastatin.     Refills needed for the followin. Bumetanide .5 - 1 pill morning. Currently have 12 remaining (12d supply).    2. Eliquis 2.5 - 1 pill BID. Currently have 28 pills (14d supply).     3. Protonix 40mg - 1 pill before meals am/pm. Currently have 38 pills (19d supply).    4. Hydralazine 25mg - 1 pill TID. Currently have 42pills (14d supply).    5 Lopressor 25mg - 1/2 am and 1/2 bedtime. Currently have 35 pills (35d supply).     Will contact Danielle Finney and Adriane for Plavix since they orders it s/p stent.     I do need help with scheduling my appointment with the EP for watchman consult. They say earliest is  but you, Danielle Pete and Tawanda all say I should be seen asap for this consult. The only days I cannot is 114-8 due to my daughter being out of the country.     Please call refills into [facility] and request delivery please. [phone].     Please let me know about Dr Prater appointment for sooner.     Thank you.

## 2022-10-24 NOTE — PROGRESS NOTES
[facility] Cardiology    Encounter Date: 10/28/2022    Patient ID: Susan Anderson is a 82 y.o. female.  : 1940     PCP: Arleen Doherty MD       Chief Complaint: NICM (nonischemic cardiomyopathy)      PROBLEM LIST:  1. Cardiomyopathy/acute on chronic combined systolic/diastolic CHF  a. Echo, 2021: EF 43%. Global hypokinesis. More pronounced in the anteroseptal wall and apex. Severe mitral annular calcification. Mild to moderate MR. Moderate sized left pleural effusion.  b. [facility], 2021: EF 40-45%. Mild to moderate nonobstructive CAD involving multiple vessels without any evidence of severe or critical disease.  c. Echo, 2022: EF 46-50%. Mild MR.    d. Echo, 2022: EF 55%. Mild MR. Aortic sclerosis without aortic stenosis or regurgitation.  e. Echo, 2022: EF 55%. Mild to moderate MR.   2. Nonobstructive coronary artery disease  a. [facility], 2021:  EF 40-45%. Mild to moderate nonobstructive CAD involving multiple vessels without any evidence of severe or critical disease.  3. Paroxysmal atrial fibrillation.  4. Peripheral vascular disease  1. Lower extremity duplex, 2022: Bilateral lower extremity arterial duplex exam reveals diffuse calcified plaque without focal obstruction up to the level of bilateral popliteals. Below knee vessels revealed similar findings with non occlusive calcified plaque in the peroneal and anterior tibial arteries with flow demonstrated to the level of ankles. Bilateral posterior tibial flow could not be detected and is suggestive of probable occlusion. Bilateral ABIs are noncompressible due to vascular calcifications.  5. Hypertension   6. CKD, stage III  1. Renal artery duplex, 2022: Limited study due to the patient's body habitus and lack of cooperation. Elevated resistive indices which may reflect underlying intrinsic renal disease. No evidence of obstructive uropathy or significant renal artery  stenosis.  7. Type II diabetes mellitus   8. Osteomyelitis s/p right great toe amputation    History of Present Illness  Patient presents today for a follow-up with a history of NICM, mitral valve disease, nonobstructive CAD, paroxysmal atrial fibrillation chronic combined systolic and diastolic heart failure, and cardiac risk factors. Since last visit, patient has been doing well overall from a cardiovascular standpoint. Since her recent hospital visit for GI bleed/knee, she has restarted Eliquis however she is felt to be high risk candidate and has been referred to EP service for watchman device.  She has an appointment in December for  The patient is doing well from cardiac standpoint, she is maintaining a fairly euvolemic status.  No current complaints of chest pain shortness of breath palpitations edema orthopnea PND or syncope   The patient has requested refills on all cardiac medications. She recently had her other medications refilled by her PCP, however, her PCP forgot to refill Protonix and she would like a refill on that as well.    Allergies   Allergen Reactions   • Baclofen Other (See Comments) and Hallucinations     PSYCHOSIS-POA REFUSES ADMINISTRATION OF THIS MED.   • Cephalexin Nausea Only   • Erythromycin Base Nausea Only   • Melatonin Other (See Comments)     Nightmares   • Oxycodone Nausea Only   • Sulfa Antibiotics Nausea Only         Current Outpatient Medications:   •  acetaminophen (TYLENOL) 325 MG tablet, Take 2 tablets by mouth Every 6 (Six) Hours As Needed for Mild Pain., Disp: , Rfl:   •  apixaban (ELIQUIS) 2.5 MG tablet tablet, Take 1 tablet by mouth 2 (Two) Times a Day., Disp: 60 tablet, Rfl: 0  •  atorvastatin (LIPITOR) 40 MG tablet, Take 1 tablet by mouth Daily. (Patient taking differently: Take 1 tablet by mouth Every Night.), Disp: 90 tablet, Rfl: 0  •  bisacodyl (DULCOLAX) 10 MG suppository, Insert 1 suppository into the rectum Every 72 (Seventy-Two) Hours As Needed for  Constipation., Disp: , Rfl:   •  bumetanide (BUMEX) 0.5 MG tablet, Take 1 tablet by mouth Daily., Disp: 30 tablet, Rfl: 0  •  Cholecalciferol (Vitamin D3 Super Strength) 50 MCG (2000 UT) tablet, Take 2,000 Units by mouth Daily. OTC, Disp: , Rfl:   •  clopidogrel (PLAVIX) 75 MG tablet, Take 1 tablet by mouth Daily., Disp: 30 tablet, Rfl: 0  •  clotrimazole-betamethasone (Lotrisone) 1-0.05 % cream, Apply 1 application topically to the appropriate area as directed 2 (Two) Times a Day., Disp: 45 g, Rfl: 2  •  cyclobenzaprine (FLEXERIL) 5 MG tablet, Take 1 tablet by mouth 3 (Three) Times a Day As Needed for Muscle Spasms., Disp: 30 tablet, Rfl: 0  •  gabapentin (NEURONTIN) 100 MG capsule, Take 1 capsule by mouth 3 (Three) Times a Day., Disp: 90 capsule, Rfl: 2  •  guaiFENesin (MUCINEX) 600 MG 12 hr tablet, Take 2 tablets by mouth 2 (Two) Times a Day., Disp: , Rfl:   •  hydrALAZINE (APRESOLINE) 25 MG tablet, Take 1 tablet by mouth Every 8 (Eight) Hours., Disp: 90 tablet, Rfl: 0  •  hydrOXYzine pamoate (Vistaril) 25 MG capsule, Take 1 capsule by mouth 3 (Three) Times a Day As Needed for Itching or Anxiety (sleep)., Disp: 30 capsule, Rfl: 5  •  insulin glargine (Lantus) 100 UNIT/ML injection, Inject 15 Units under the skin into the appropriate area as directed Every Night., Disp: , Rfl:   •  insulin lispro (HumaLOG) 100 UNIT/ML injection, Inject 12 Units under the skin into the appropriate area as directed 3 (Three) Times a Day Before Meals. (Patient taking differently: Inject 12 Units under the skin into the appropriate area as directed. 3 units tid + Sliding Scale Before Meals: 6 units- 200-250 8 units- 251-300 10 units- 301-350 12 units - 351-400), Disp: 20 mL, Rfl: 5  •  lactobacillus acidophilus (RISAQUAD) capsule capsule, Take 1 capsule by mouth Daily., Disp: 30 capsule, Rfl: 0  •  metoprolol tartrate (LOPRESSOR) 25 MG tablet, Take 0.5 (one-half) tablet by mouth Every 12 (Twelve) Hours., Disp: 60 tablet, Rfl: 0  •   "ondansetron ODT (ZOFRAN-ODT) 4 MG disintegrating tablet, Place 1 tablet on the tongue Every 8 (Eight) Hours As Needed for Nausea or Vomiting., Disp: 14 tablet, Rfl: 0  •  pantoprazole (PROTONIX) 40 MG EC tablet, Take 1 tablet by mouth 2 (Two) Times a Day Before Meals., Disp: 60 tablet, Rfl: 0  •  polyethylene glycol (MIRALAX) 17 GM/SCOOP powder, Dissolve 17 g (1 capful) in 8oz of non-carbonated liquid and drink by mouth Daily., Disp: 510 g, Rfl: 0  •  sennosides-docusate (PERICOLACE) 8.6-50 MG per tablet, Take 1 tablet by mouth Every Night. (Patient taking differently: Take 1 tablet by mouth every night at bedtime.), Disp: , Rfl:   •  sertraline (Zoloft) 25 MG tablet, Take 1 tablet by mouth Daily., Disp: 30 tablet, Rfl: 3  •  vitamin B-12 (CYANOCOBALAMIN) 100 MCG tablet, Take 1 tablet by mouth Daily. OTC, Disp: , Rfl:     The following portions of the patient's history were reviewed and updated as appropriate: allergies, current medications, past family history, past medical history, past social history, past surgical history and problem list.    ROS  Review of Systems   14 point ROS negative except for that listed in the HPI.         Objective:     /78 (BP Location: Left arm, Patient Position: Sitting)   Pulse 66   Ht 162.6 cm (64\")   Wt 76 kg (167 lb 8 oz)   SpO2 95%   BMI 28.75 kg/m²      Physical Exam  Constitutional: Patient appears well-developed and well-nourished.   HENT: HEENT exam unremarkable.   Neck: Neck supple. No JVD present. No carotid bruits.   Cardiovascular: Normal rate, regular rhythm and normal heart sounds. No murmur heard.   2+ symmetric pulses.   Pulmonary/Chest: Breath sounds normal. Does not exhibit tenderness.   Abdominal: Abdomen benign.   Musculoskeletal: Does not exhibit edema.   Neurological: Neurological exam unremarkable.   Vitals reviewed.    Data Review:   Lab Results   Component Value Date    GLUCOSE 81 10/25/2022    BUN 55 (H) 10/25/2022    CREATININE 1.32 (H) " 10/25/2022    EGFRIFNONA 41 (L) 02/25/2022    BCR 41.7 (H) 10/25/2022     10/25/2022    K 4.4 10/25/2022    CO2 28.1 10/25/2022    CALCIUM 8.7 10/25/2022    ALBUMIN 2.90 (L) 10/25/2022    AST 42 (H) 10/25/2022    ALT 20 10/25/2022     Lab Results   Component Value Date    CHOL 101 10/25/2022    TRIG 102 10/25/2022    HDL 37 (L) 10/25/2022    LDL 45 10/25/2022      Lab Results   Component Value Date    WBC 5.22 10/25/2022    RBC 3.07 (L) 10/25/2022    HGB 9.4 (L) 10/25/2022    HCT 28.7 (L) 10/25/2022    MCV 93.5 10/25/2022     10/25/2022     Lab Results   Component Value Date    TSH 3.730 10/25/2022     Lab Results   Component Value Date    HGBA1C 6.70 (H) 10/25/2022        Procedures             Assessment:      Diagnosis Plan   1. Coronary artery disease involving native coronary artery of native heart without angina pectoris  Stable without angina on current activity. Continue on Eliquis 2.5 mg BID and Plavix 75 mg daily.       2. NICM (nonischemic cardiomyopathy) (HCC)  Stable and asymptomatic.  Well compensated from CHF standpoint, continue current dose of Bumex for volume overload, continue metoprolol and hydralazine.  No ACE inhibitor/ARB/Entresto due to kidney disease   3.   Paroxysmal atrial fibrillation  continue metoprolol and Eliquis, has been referred to EP service for consideration of watchman device.   4. Chronic combined systolic and diastolic heart failure (HCC)  Stable and compensated, clinically euvolemic, continue current guideline based medical therapy   5. Essential hypertension  Well controlled. Continue on Bumex 0.5 mg daily, metoprolol 12.5 mg BID, and hydralazine 25 mg TID.    6. Dyslipidemia  Well controlled. LDL 45 on 10/25/2022. Continue on atorvastatin 40 mg nightly.         Plan:   Stable cardiac status without recurrent angina or CHF symptoms.  Initiate iron supplementation for anemia and follow up with PCP for further monitoring  Keep appointment with EP service for  consideration of watchman device.   Continue current medications.   FU in 6 MO, sooner as needed.  Thank you for allowing us to participate in the care of your patient.     Scribed for Anjum Ceballos MD by Kiara Montilla. 10/28/2022 09:49 EDT    I, Anjum Ceballos MD, personally performed the services described in this documentation as scribed by the above named individual in my presence, and it is both accurate and complete.  10/28/2022  10:11 EDT      Please note that portions of this note may have been completed with a voice recognition program. Efforts were made to edit the dictations, but occasionally words are mistranscribed.

## 2022-10-25 ENCOUNTER — OFFICE VISIT (OUTPATIENT)
Dept: INTERNAL MEDICINE | Facility: CLINIC | Age: 82
End: 2022-10-25

## 2022-10-25 ENCOUNTER — TREATMENT (OUTPATIENT)
Dept: PHYSICAL THERAPY | Facility: CLINIC | Age: 82
End: 2022-10-25

## 2022-10-25 VITALS
HEIGHT: 64 IN | OXYGEN SATURATION: 99 % | DIASTOLIC BLOOD PRESSURE: 72 MMHG | TEMPERATURE: 97.5 F | SYSTOLIC BLOOD PRESSURE: 124 MMHG | HEART RATE: 72 BPM | BODY MASS INDEX: 28.85 KG/M2 | WEIGHT: 169 LBS

## 2022-10-25 DIAGNOSIS — G62.9 NEUROPATHY: ICD-10-CM

## 2022-10-25 DIAGNOSIS — F51.01 PRIMARY INSOMNIA: ICD-10-CM

## 2022-10-25 DIAGNOSIS — F41.9 ANXIETY: ICD-10-CM

## 2022-10-25 DIAGNOSIS — I80.9 PHLEBITIS: ICD-10-CM

## 2022-10-25 DIAGNOSIS — Z74.1 DEPENDENT FOR MOBILITY: ICD-10-CM

## 2022-10-25 DIAGNOSIS — L29.9 PRURITUS: ICD-10-CM

## 2022-10-25 DIAGNOSIS — M25.561 ACUTE PAIN OF BOTH KNEES: ICD-10-CM

## 2022-10-25 DIAGNOSIS — M25.562 ACUTE PAIN OF BOTH KNEES: ICD-10-CM

## 2022-10-25 DIAGNOSIS — Z00.00 MEDICARE ANNUAL WELLNESS VISIT, SUBSEQUENT: Primary | ICD-10-CM

## 2022-10-25 DIAGNOSIS — G89.29 CHRONIC MIDLINE BACK PAIN, UNSPECIFIED BACK LOCATION: ICD-10-CM

## 2022-10-25 DIAGNOSIS — M54.9 CHRONIC MIDLINE BACK PAIN, UNSPECIFIED BACK LOCATION: ICD-10-CM

## 2022-10-25 DIAGNOSIS — Z00.00 ANNUAL PHYSICAL EXAM: ICD-10-CM

## 2022-10-25 DIAGNOSIS — R53.1 WEAKNESS GENERALIZED: Primary | ICD-10-CM

## 2022-10-25 PROCEDURE — 82607 VITAMIN B-12: CPT | Performed by: INTERNAL MEDICINE

## 2022-10-25 PROCEDURE — 1170F FXNL STATUS ASSESSED: CPT | Performed by: INTERNAL MEDICINE

## 2022-10-25 PROCEDURE — 97110 THERAPEUTIC EXERCISES: CPT | Performed by: PHYSICAL THERAPIST

## 2022-10-25 PROCEDURE — 85027 COMPLETE CBC AUTOMATED: CPT | Performed by: INTERNAL MEDICINE

## 2022-10-25 PROCEDURE — 80061 LIPID PANEL: CPT | Performed by: INTERNAL MEDICINE

## 2022-10-25 PROCEDURE — 97116 GAIT TRAINING THERAPY: CPT | Performed by: PHYSICAL THERAPIST

## 2022-10-25 PROCEDURE — 84443 ASSAY THYROID STIM HORMONE: CPT | Performed by: INTERNAL MEDICINE

## 2022-10-25 PROCEDURE — 99214 OFFICE O/P EST MOD 30 MIN: CPT | Performed by: INTERNAL MEDICINE

## 2022-10-25 PROCEDURE — 99397 PER PM REEVAL EST PAT 65+ YR: CPT | Performed by: INTERNAL MEDICINE

## 2022-10-25 PROCEDURE — 80053 COMPREHEN METABOLIC PANEL: CPT | Performed by: INTERNAL MEDICINE

## 2022-10-25 PROCEDURE — G0439 PPPS, SUBSEQ VISIT: HCPCS | Performed by: INTERNAL MEDICINE

## 2022-10-25 PROCEDURE — 83036 HEMOGLOBIN GLYCOSYLATED A1C: CPT | Performed by: INTERNAL MEDICINE

## 2022-10-25 PROCEDURE — 97530 THERAPEUTIC ACTIVITIES: CPT | Performed by: PHYSICAL THERAPIST

## 2022-10-25 PROCEDURE — 1160F RVW MEDS BY RX/DR IN RCRD: CPT | Performed by: INTERNAL MEDICINE

## 2022-10-25 RX ORDER — CLOTRIMAZOLE AND BETAMETHASONE DIPROPIONATE 10; .64 MG/G; MG/G
1 CREAM TOPICAL 2 TIMES DAILY
Qty: 45 G | Refills: 2 | Status: SHIPPED | OUTPATIENT
Start: 2022-10-25

## 2022-10-25 RX ORDER — HYDROXYZINE PAMOATE 25 MG/1
25 CAPSULE ORAL 3 TIMES DAILY PRN
Qty: 30 CAPSULE | Refills: 5 | Status: ON HOLD | OUTPATIENT
Start: 2022-10-25 | End: 2023-01-03

## 2022-10-25 RX ORDER — SERTRALINE HYDROCHLORIDE 25 MG/1
25 TABLET, FILM COATED ORAL DAILY
Qty: 30 TABLET | Refills: 3 | Status: SHIPPED | OUTPATIENT
Start: 2022-10-25 | End: 2022-10-31

## 2022-10-25 RX ORDER — ONDANSETRON 4 MG/1
4 TABLET, ORALLY DISINTEGRATING ORAL EVERY 8 HOURS PRN
Qty: 14 TABLET | Refills: 0 | Status: SHIPPED | OUTPATIENT
Start: 2022-10-25 | End: 2023-01-24 | Stop reason: SDUPTHER

## 2022-10-25 RX ORDER — GABAPENTIN 100 MG/1
100 CAPSULE ORAL 3 TIMES DAILY
Qty: 90 CAPSULE | Refills: 2 | Status: SHIPPED | OUTPATIENT
Start: 2022-10-25 | End: 2022-12-27

## 2022-10-25 RX ORDER — CYCLOBENZAPRINE HCL 5 MG
5 TABLET ORAL 3 TIMES DAILY PRN
Qty: 30 TABLET | Refills: 0 | Status: SHIPPED | OUTPATIENT
Start: 2022-10-25 | End: 2023-01-24 | Stop reason: SDUPTHER

## 2022-10-25 NOTE — PROGRESS NOTES
Physical Therapy Treatment Note  VISIT: 2          Subjective    Susan Anderson reports: she is doing her exercises at home.  The ones in the chair are easier than the ones in the bed.        Objective    Treatment    Exercise 1  Exercise Name 1: gait with wheeled walker (3 short walks to treatment table, 2 short walks back to waiting room.)  Exercise 2  Exercise Name 2: sit<>stand  Sets/Reps 2: 5x  Exercise 3  Exercise Name 3: standing tolerance  Time 3: 5'  Exercise 4  Exercise Name 4: sit<>supine (mod to max assist of 1 (uses bedrails at home))  Exercise 5  Exercise Name 5: propped on elbow<>sit  Sets/Reps 5: 5x each side  Exercise 6  Exercise Name 6: seated ball rollout-middle and to each side  Sets/Reps 6: 10x each  Exercise 7  Exercise Name 7: overhead press with small weightless ball  Sets/Reps 7: 10x  Exercise 8  Exercise Name 8: bridging  Sets/Reps 8: [id number]  Exercise 9  Exercise Name 9: LTR  Sets/Reps 9: 20x  Exercise 10  Exercise Name 10: ab crunches (3-way)  Sets/Reps 10: 10x each  Exercise 11  Exercise Name 11: seated ball diagonals-trunk rotation                 Assessment & Plan     Assessment    Assessment details: Pt becomes anxious and fearful of falling during gait.  Longest walk today was 20', shortest walk was 10'.  Pt is able to roll both directions on large mat table.  She requires significant assistance for sit<>supine transfers.  She will benefit from gradually progressive mobility training and functional strengthening.    Plan  Plan details: Continue PT.  Progress as pt tolerates.               Timed:  Manual Therapy:         mins  85394;  Therapeutic Exercise:    25     mins  15280;     Neuromuscular Fernando:        mins  16852;    Therapeutic Activity:     15     mins  72830;     Gait Training:      15     mins  34620;     Ultrasound:         mins  06992;    Electrical Stimulation:         mins  10744 ( );    Untimed:  Electrical Stimulation:         mins  63401 (  );  Mechanical Traction:         mins  48190;  Canalith Repositioning       mins  47797    Timed Treatment:   55   mins   Total Treatment:     55   mins      Peyton Gayle, PT  Physical Therapist

## 2022-10-26 ENCOUNTER — READMISSION MANAGEMENT (OUTPATIENT)
Dept: CALL CENTER | Facility: HOSPITAL | Age: 82
End: 2022-10-26

## 2022-10-26 ENCOUNTER — TELEPHONE (OUTPATIENT)
Dept: INTERNAL MEDICINE | Facility: CLINIC | Age: 82
End: 2022-10-26

## 2022-10-26 LAB
ALBUMIN SERPL-MCNC: 2.9 G/DL (ref 3.5–5.2)
ALBUMIN/GLOB SERPL: 0.9 G/DL
ALP SERPL-CCNC: 224 U/L (ref 39–117)
ALT SERPL W P-5'-P-CCNC: 20 U/L (ref 1–33)
ANION GAP SERPL CALCULATED.3IONS-SCNC: 11.9 MMOL/L (ref 5–15)
AST SERPL-CCNC: 42 U/L (ref 1–32)
BILIRUB SERPL-MCNC: 0.5 MG/DL (ref 0–1.2)
BUN SERPL-MCNC: 55 MG/DL (ref 8–23)
BUN/CREAT SERPL: 41.7 (ref 7–25)
CALCIUM SPEC-SCNC: 8.7 MG/DL (ref 8.6–10.5)
CHLORIDE SERPL-SCNC: 102 MMOL/L (ref 98–107)
CHOLEST SERPL-MCNC: 101 MG/DL (ref 0–200)
CO2 SERPL-SCNC: 28.1 MMOL/L (ref 22–29)
CREAT SERPL-MCNC: 1.32 MG/DL (ref 0.57–1)
DEPRECATED RDW RBC AUTO: 56.6 FL (ref 37–54)
EGFRCR SERPLBLD CKD-EPI 2021: 40.1 ML/MIN/1.73
ERYTHROCYTE [DISTWIDTH] IN BLOOD BY AUTOMATED COUNT: 16.6 % (ref 12.3–15.4)
GLOBULIN UR ELPH-MCNC: 3.1 GM/DL
GLUCOSE SERPL-MCNC: 81 MG/DL (ref 65–99)
HBA1C MFR BLD: 6.7 % (ref 4.8–5.6)
HCT VFR BLD AUTO: 28.7 % (ref 34–46.6)
HDLC SERPL-MCNC: 37 MG/DL (ref 40–60)
HGB BLD-MCNC: 9.4 G/DL (ref 12–15.9)
LDLC SERPL CALC-MCNC: 45 MG/DL (ref 0–100)
LDLC/HDLC SERPL: 1.18 {RATIO}
MCH RBC QN AUTO: 30.6 PG (ref 26.6–33)
MCHC RBC AUTO-ENTMCNC: 32.8 G/DL (ref 31.5–35.7)
MCV RBC AUTO: 93.5 FL (ref 79–97)
PLATELET # BLD AUTO: 298 10*3/MM3 (ref 140–450)
PMV BLD AUTO: 10.6 FL (ref 6–12)
POTASSIUM SERPL-SCNC: 4.4 MMOL/L (ref 3.5–5.2)
PROT SERPL-MCNC: 6 G/DL (ref 6–8.5)
RBC # BLD AUTO: 3.07 10*6/MM3 (ref 3.77–5.28)
SODIUM SERPL-SCNC: 142 MMOL/L (ref 136–145)
TRIGL SERPL-MCNC: 102 MG/DL (ref 0–150)
TSH SERPL DL<=0.05 MIU/L-ACNC: 3.73 UIU/ML (ref 0.27–4.2)
VIT B12 BLD-MCNC: 1335 PG/ML (ref 211–946)
VLDLC SERPL-MCNC: 19 MG/DL (ref 5–40)
WBC NRBC COR # BLD: 5.22 10*3/MM3 (ref 3.4–10.8)

## 2022-10-26 NOTE — TELEPHONE ENCOUNTER
----- Message from Arleen Doherty MD sent at 10/26/2022 10:19 AM EDT -----  Thyroid level normal,  Creatinine slightly elevated, increase water intake and repeat in 2 weeks.  A1c at goal.  Hemoglobin trending up

## 2022-10-26 NOTE — OUTREACH NOTE
Medical Week 3 Survey    Flowsheet Row Responses   [facility] patient discharged from? [facility]   Does the patient have one of the following disease processes/diagnoses(primary or secondary)? Other   Week 3 attempt successful? No   Unsuccessful attempts Attempt 1          ELIU VICENTE - Licensed Nurse

## 2022-10-27 ENCOUNTER — TREATMENT (OUTPATIENT)
Dept: PHYSICAL THERAPY | Facility: CLINIC | Age: 82
End: 2022-10-27

## 2022-10-27 DIAGNOSIS — Z74.1 DEPENDENT FOR MOBILITY: ICD-10-CM

## 2022-10-27 DIAGNOSIS — R53.1 WEAKNESS GENERALIZED: Primary | ICD-10-CM

## 2022-10-27 PROCEDURE — 97110 THERAPEUTIC EXERCISES: CPT | Performed by: PHYSICAL THERAPIST

## 2022-10-27 PROCEDURE — 97112 NEUROMUSCULAR REEDUCATION: CPT | Performed by: PHYSICAL THERAPIST

## 2022-10-27 PROCEDURE — 97116 GAIT TRAINING THERAPY: CPT | Performed by: PHYSICAL THERAPIST

## 2022-10-27 NOTE — PROGRESS NOTES
"            Physical Therapy Treatment Note  VISIT: 3          Subjective    Susan Anderson reports: she walks a little bit at home.  It is hard to get up from her favorite chair.          Objective    Treatment    Exercise 1  Exercise Name 1: gait with wheeled walker-multiple short walks, 25' longest  Exercise 2  Exercise Name 2: sit<>stand  Sets/Reps 2: 5x 2 sets  Exercise 3  Exercise Name 3: standing facing wall with UE reach up wall  Exercise 4  Exercise Name 4: standing with back to wall-postural exercise  Exercise 5  Exercise Name 5: sit<>supine each direction  Exercise 6  Exercise Name 6: seated chest press  Equipment/Resistance 6: yellow band  Exercise 7  Exercise Name 7: assisted UE wand flexion/abduction sitting with ball behind back  Exercise 8  Exercise Name 8: LAQ  Equipment/Resistance 8: 2# cuffs  Exercise 9  Exercise Name 9: standing alternating step taps to 3\" surface                 Assessment & Plan     Assessment    Assessment details: Pt demonstrates gradually improving tolerance for activity.  She requires frequent reassurance and encouragement to continue with activity when it becomes difficult.    Plan  Plan details: Continue PT.  Progress exercise and mobility training as tolerated.               Timed:  Manual Therapy:         mins  53894;  Therapeutic Exercise:    35     mins  96436;     Neuromuscular Fernando:    10    mins  93253;    Therapeutic Activity:          mins  47606;     Gait Training:      10     mins  06225;     Ultrasound:          mins  34702;    Electrical Stimulation:         mins  84166 ( );    Untimed:  Electrical Stimulation:         mins  21685 ( );  Mechanical Traction:         mins  16008;  Canalith Repositioning       mins  19457    Timed Treatment:   55   mins   Total Treatment:     55   mins      Peyton Gayle, PT  Physical Therapist                    "

## 2022-10-28 ENCOUNTER — HOSPITAL ENCOUNTER (OUTPATIENT)
Dept: PHYSICAL THERAPY | Facility: HOSPITAL | Age: 82
Setting detail: THERAPIES SERIES
Discharge: HOME | End: 2022-10-28

## 2022-10-28 ENCOUNTER — OFFICE VISIT (OUTPATIENT)
Dept: CARDIOLOGY | Facility: CLINIC | Age: 82
End: 2022-10-28

## 2022-10-28 VITALS
OXYGEN SATURATION: 95 % | HEART RATE: 66 BPM | HEIGHT: 64 IN | WEIGHT: 167.5 LBS | BODY MASS INDEX: 28.6 KG/M2 | SYSTOLIC BLOOD PRESSURE: 120 MMHG | DIASTOLIC BLOOD PRESSURE: 78 MMHG

## 2022-10-28 DIAGNOSIS — I10 ESSENTIAL HYPERTENSION: ICD-10-CM

## 2022-10-28 DIAGNOSIS — I48.0 PAROXYSMAL ATRIAL FIBRILLATION: ICD-10-CM

## 2022-10-28 DIAGNOSIS — S91.302D OPEN WOUND OF LEFT FOOT, SUBSEQUENT ENCOUNTER: Primary | ICD-10-CM

## 2022-10-28 DIAGNOSIS — E78.5 DYSLIPIDEMIA: ICD-10-CM

## 2022-10-28 DIAGNOSIS — I05.9 MITRAL VALVE DISEASE: ICD-10-CM

## 2022-10-28 DIAGNOSIS — I50.42 CHRONIC COMBINED SYSTOLIC AND DIASTOLIC HEART FAILURE: ICD-10-CM

## 2022-10-28 DIAGNOSIS — I25.10 CORONARY ARTERY DISEASE INVOLVING NATIVE CORONARY ARTERY OF NATIVE HEART WITHOUT ANGINA PECTORIS: Primary | ICD-10-CM

## 2022-10-28 DIAGNOSIS — I42.8 NICM (NONISCHEMIC CARDIOMYOPATHY): ICD-10-CM

## 2022-10-28 PROCEDURE — 97597 DBRDMT OPN WND 1ST 20 CM/<: CPT

## 2022-10-28 PROCEDURE — 99214 OFFICE O/P EST MOD 30 MIN: CPT | Performed by: INTERNAL MEDICINE

## 2022-10-28 RX ORDER — BUMETANIDE 0.5 MG/1
0.5 TABLET ORAL DAILY
Qty: 30 TABLET | Refills: 6 | Status: SHIPPED | OUTPATIENT
Start: 2022-10-28 | End: 2023-01-20 | Stop reason: HOSPADM

## 2022-10-28 RX ORDER — CLOPIDOGREL BISULFATE 75 MG/1
75 TABLET ORAL DAILY
Qty: 30 TABLET | Refills: 6 | Status: ON HOLD | OUTPATIENT
Start: 2022-10-28 | End: 2023-01-20 | Stop reason: SDUPTHER

## 2022-10-28 RX ORDER — ATORVASTATIN CALCIUM 40 MG/1
40 TABLET, FILM COATED ORAL DAILY
Qty: 30 TABLET | Refills: 6 | Status: SHIPPED | OUTPATIENT
Start: 2022-10-28

## 2022-10-28 RX ORDER — PANTOPRAZOLE SODIUM 40 MG/1
40 TABLET, DELAYED RELEASE ORAL
Qty: 60 TABLET | Refills: 0 | Status: SHIPPED | OUTPATIENT
Start: 2022-10-28 | End: 2022-11-27

## 2022-10-28 RX ORDER — HYDRALAZINE HYDROCHLORIDE 25 MG/1
25 TABLET, FILM COATED ORAL EVERY 8 HOURS SCHEDULED
Qty: 90 TABLET | Refills: 6 | Status: SHIPPED | OUTPATIENT
Start: 2022-10-28 | End: 2023-01-20 | Stop reason: HOSPADM

## 2022-10-28 NOTE — THERAPY WOUND CARE TREATMENT
Outpatient Rehabilitation - Wound/Debridement Treatment Note   [place]     Patient Name: Susan Anderson  : 1940  MRN: [id number]  Today's Date: 10/28/2022                 Admit Date: 10/28/2022    Visit Dx:    ICD-10-CM ICD-9-CM   1. Open wound of left foot, subsequent encounter  S91.302D V58.89     892.0       Patient Active Problem List   Diagnosis   • Diabetic foot ulcer ([facility])   • PVD (peripheral vascular disease) ([facility])   • Osteomyelitis ([facility])   • Diabetes mellitus with neuropathy ([facility])   • Essential hypertension   • Stage 3a chronic kidney disease ([facility])   • Pyogenic inflammation of bone ([facility])   • Hyperglycemia   • Pneumonia due to infectious organism   • Mitral valve disease   • NICM (nonischemic cardiomyopathy) ([facility])   • Coronary artery disease involving native coronary artery of native heart without angina pectoris   • Dyslipidemia   • Chronic combined systolic and diastolic heart failure ([facility])   • Lumbar stenosis with neurogenic claudication   • Spondylosis of lumbar region without myelopathy or radiculopathy   • Spondylolisthesis, lumbar region   • Degeneration of lumbar or lumbosacral intervertebral disc   • Gait disturbance   • Physical deconditioning   • Sarcopenia   • Weakness   • Anemia, chronic disease   • Fall   • Dizziness   • Demand ischemia ([facility])   • Effusion of right knee   • Right knee pain   • Pressure injury of skin of sacral region   • Sleep apnea   • GIB (gastrointestinal bleeding)   • Vasovagal syncope   • Hypomagnesemia   • Syncope, unspecified syncope type   • Paroxysmal atrial fibrillation ([facility])        Past Medical History:   Diagnosis Date   • Arthritis    • Asthma  - double pneumonia    Currently on inhaler and nebulizer   • Atrial fibrillation ([facility]) 2022   • Cancer ([facility])     cervical cancer, skin cancer   • CHF (congestive heart failure) ([facility]) 2021   • Chronic kidney disease Related to diabetes   • Coronary artery disease 2021 DX for hear failure   •  Diabetes mellitus ([facility]) 30 years    Seeing Dr. Lam 1st time Aug 19   • Gout    • Hx of colonoscopy    • Hyperlipidemia Reference current labs x 2-3yrs approx   • Hypertension 30 years   • Migraine    • Mitral valve disease    • Mitral valve disease    • Osteomyelitis ([facility])    • Peripheral neuropathy    • Sleep apnea    • Type 2 diabetes mellitus ([facility])     30 years        Past Surgical History:   Procedure Laterality Date   • ABDOMINAL HYSTERECTOMY W/SALPINGECTOMY     • AMPUTATION  Right great toe, 1st 1/3 metatarsal -Reg 4/14/21   • AORTAGRAM N/A 4/9/2021    Procedure: AORTAGRAM WITH OR WITHOUT RUNOFFS, WITH Co2;  Surgeon: Trey Forrest MD;  Location:  [facility];  Service: Vascular;  Laterality: N/A;   • AORTAGRAM N/A 9/28/2022    Procedure: CO2 ANGIOGRAM, LEFT SFA ANGIOPLASTY WITH DRUG ELUTING BALLOON, LEFT SFA STENT PLACEMENT ;  Surgeon: Trey Forrest MD;  Location:  [facility];  Service: Vascular;  Laterality: N/A;  FLUORO: 13.12  DOSE 162mGy  CONTRAST: Isovue 350: 15ml   • APPENDECTOMY     • BRAIN TUMOR EXCISION      laser surgery    • CARDIAC CATHETERIZATION N/A 6/21/2021    Procedure: LEFT HEART CATH;  Surgeon: Anjum Ceballos MD;  Location:  [facility];  Service: Cardiology;  Laterality: N/A;   • CATARACT EXTRACTION W/ INTRAOCULAR LENS  IMPLANT, BILATERAL     • CHOLECYSTECTOMY     • ENDOSCOPY N/A 10/7/2022    Procedure: ESOPHAGOGASTRODUODENOSCOPY;  Surgeon: Stef Veras MD;  Location:  [facility];  Service: Gastroenterology;  Laterality: N/A;   • EYE SURGERY     • HYSTERECTOMY     • TOE SURGERY     • TRANS METATARSAL AMPUTATION Right 4/14/2021    Procedure: AMPUTATION TRANS METATARSAL RIGHT GREAT TOE;  Surgeon: Valdez Finney MD;  Location:  [facility];  Service: Vascular;  Laterality: Right;         EVALUATION   PT Ortho     Row Name 10/28/22 1100       Subjective Comments    Subjective Comments Dtr reports the wound looks like it's getting better. She agrees it  "looks like there is new skin growing  -       Subjective Pain    Able to rate subjective pain? yes  -    Pre-Treatment Pain Level 0  -    Post-Treatment Pain Level 0  -    Subjective Pain Comment DPN  -       Transfers    Comment, (Transfers) remained seated for tx  -          User Key  (r) = Recorded By, (t) = Taken By, (c) = Cosigned By    Initials Name Provider Type    Lucila Mohan PT Physical Therapist                 LDA Wound     Row Name 10/28/22 1100             Wound 10/08/22 1400 Left anterior foot Arterial Ulcer    Wound - Properties Group Placement Date: 10/08/22  - Placement Time: 1400  - Side: Left  - Orientation: anterior  - Location: foot  -MF Primary Wound Type: Arterial ulc  -    Dressing Appearance moist drainage;intact  -      Base moist;pink;red;yellow;slough;epithelialization  new epithelialization proximal and distal aspects  -      Periwound intact;dry;redness;warm  -      Periwound Temperature warm  -      Periwound Skin Turgor soft  -      Edges open;rolled/closed  -      Wound Length (cm) 1.4 cm  -      Wound Width (cm) 2.1 cm  -      Wound Depth (cm) 0.5 cm  partially obscured  -      Wound Surface Area (cm^2) 2.94 cm^2  -      Wound Volume (cm^3) 1.47 cm^3  -      Drainage Characteristics/Odor serosanguineous  -      Drainage Amount small  -      Care, Wound cleansed with;wound cleanser;debrided  -      Dressing Care dressing applied;silver impregnated;collagen;antimicrobial agent applied;foam;low-adherent;border dressing  carolee, saline-moist HFBc, 4\" optifoam  -      Periwound Care cleansed with pH balanced cleanser;dry periwound area maintained  -      Retired Wound - Properties Group Placement Date: 10/08/22  - Placement Time: 1400  -MF Side: Left  - Orientation: anterior  - Location: foot  -MF Primary Wound Type: Arterial ulc  -MF    Retired Wound - Properties Group Date first assessed: 10/08/22  - Time first " assessed: 1400  -MF Side: Left  -MF Location: foot  -MF Primary Wound Type: Arterial ulc  -          User Key  (r) = Recorded By, (t) = Taken By, (c) = Cosigned By    Initials Name Provider Type    Avtar Small, PT Physical Therapist    Lucila Mohan PT Physical Therapist                  WOUND DEBRIDEMENT  Total area of Debridement: 2 cm2  Debridement Site 1  Location- Site 1: L foot wound  Selective Debridement- Site 1: Wound Surface <20cmsq  Instruments- Site 1: #15, scapel, tweezers  Excised Tissue Description- Site 1: moderate, slough  Bleeding- Site 1: none              Therapy Education     Row Name 10/28/22 1100             Therapy Education    Education Details Continue current POC, trimming HFBc to only the open area  -      Given Symptoms/condition management;Bandaging/dressing change  -      Program Reinforced;Modified  -      How Provided Verbal;Demonstration  -      Provided to Patient;Caregiver  daughter  -      Level of Understanding Verbalized;Teach back education performed  -            User Key  (r) = Recorded By, (t) = Taken By, (c) = Cosigned By    Initials Name Provider Type    Lucila Mohan, PT Physical Therapist                Recommendation and Plan   PT Assessment/Plan     Row Name 10/28/22 1100          PT Assessment    Functional Limitations Performance in self-care ADL;Impaired gait;Other (comment)  wound management limitations  -     Impairments Impaired arterial circulation;Integumentary integrity;Pain;Impaired sensory integrity  -     Assessment Comments Pt with notable improvement in wound dimensions since last assessment, with new epithelialization proximally and distally. PT able to debride additional slough on both sides of the wound as well. PT will continue to benefit from the current POC to promote healing.  -     Rehab Potential Fair  -     Patient/caregiver participated in establishment of treatment plan and goals Yes  -      Patient would benefit from skilled therapy intervention Yes  -        PT Plan    PT Frequency 1x/week  -     Physical Therapy Interventions (Optional Details) wound care;patient/family education  -     PT Plan Comments debridement, dressings  -           User Key  (r) = Recorded By, (t) = Taken By, (c) = Cosigned By    Initials Name Provider Type    Lucila Mohan, PT Physical Therapist                Goals   PT OP Goals     Row Name 10/28/22 1133          Time Calculation    PT Goal Re-Cert Due Date 01/01/23  -           User Key  (r) = Recorded By, (t) = Taken By, (c) = Cosigned By    Initials Name Provider Type     Lucila Ulrich, PT Physical Therapist                PT Goal Re-Cert Due Date: 01/01/23            Time Calculation: Start Time: 1050  Untimed Charges  [id number] debridement: 20  Total Minutes  Untimed Charges Total Minutes: 20   Total Minutes: 20  Therapy Charges for Today     Code Description Service Date Service Provider Modifiers Qty    [id number] HC PIPE DEBRIDE OPEN WOUND UP TO 20CM 10/28/2022 Lucila Ulrich, PT GP 1                  Lucila Ulrich, PT  10/28/2022

## 2022-10-31 ENCOUNTER — TELEMEDICINE (OUTPATIENT)
Dept: INTERNAL MEDICINE | Facility: CLINIC | Age: 82
End: 2022-10-31

## 2022-10-31 DIAGNOSIS — R21 RASH: Primary | ICD-10-CM

## 2022-10-31 PROCEDURE — 99213 OFFICE O/P EST LOW 20 MIN: CPT | Performed by: INTERNAL MEDICINE

## 2022-10-31 RX ORDER — METHYLPREDNISOLONE 4 MG/1
TABLET ORAL
Qty: 21 TABLET | Refills: 0 | Status: SHIPPED | OUTPATIENT
Start: 2022-10-31 | End: 2022-11-05

## 2022-10-31 NOTE — PROGRESS NOTES
Video visit Note      Date: 10/31/2022  Patient Name: Susan Anderson  MRN: [id number]  : 1940    CC: rash    History of Present Illness: Susan Anderson is a 82 y.o. female who presents for rash that started a few days ago. Started zoloft Friday. Also start hydroxyzine. Rash is itchy. Other than zoloft and hydroxyzine no new exposures.     Subjective      Review of Systems:   Pertinent review of systems per HPI.    Review of Systems   Constitutional: Negative for activity change, appetite change, chills, diaphoresis, fatigue, fever and unexpected weight change.   HENT: Negative for congestion, dental problem, drooling, ear discharge, ear pain, facial swelling, hearing loss and mouth sores.    Eyes: Negative for pain, discharge and itching.   Respiratory: Negative for apnea, cough, choking, chest tightness and shortness of breath.    Cardiovascular: Negative for chest pain, palpitations and leg swelling.   Gastrointestinal: Negative for abdominal distention, abdominal pain, blood in stool, constipation and diarrhea.   Endocrine: Negative for cold intolerance, heat intolerance, polydipsia and polyuria.   Genitourinary: Negative for difficulty urinating, dysuria, frequency and hematuria.   Skin: Positive for rash. Negative for color change, pallor and wound.   Allergic/Immunologic: Negative for environmental allergies, food allergies and immunocompromised state.   Neurological: Negative for dizziness, weakness and light-headedness.   Psychiatric/Behavioral: Negative for agitation, behavioral problems, confusion, decreased concentration and self-injury. The patient is not nervous/anxious.    All other systems reviewed and are negative.    Allergies   Allergen Reactions   • Baclofen Other (See Comments) and Hallucinations     PSYCHOSIS-POA REFUSES ADMINISTRATION OF THIS MED.   • Cephalexin Nausea Only   • Erythromycin Base Nausea Only   • Melatonin Other (See Comments)     Nightmares   • Oxycodone Nausea Only    • Sulfa Antibiotics Nausea Only   • Zoloft [Sertraline] Rash       Objective     Physical Exam:  Vital Signs: There were no vitals filed for this visit.   There is no height or weight on file to calculate BMI.    Physical Exam  Skin:     Comments: Faint pink rash over back         Assessment / Plan      Assessment & Plan:    1. Rash  Stop zoloft, added to allergy list. rx for medrol dose pack. Take hydroxyzine tid prn for itching. F/u to reassess rash and mood.   - methylPREDNISolone (MEDROL) 4 MG dose pack; Take as directed on package instructions - 6 day taper.  Dispense: 21 tablet; Refill: 0    You have chosen to receive care through a telehealth visit.  Do you consent to use a video/audio connection for your medical care today? Yes  Pt was at home in [place] and provider was a clinic [place].   I spent 35 mins on this encounter.       Arleen Doherty MD  10/31/2022

## 2022-11-01 ENCOUNTER — TREATMENT (OUTPATIENT)
Dept: PHYSICAL THERAPY | Facility: CLINIC | Age: 82
End: 2022-11-01

## 2022-11-01 DIAGNOSIS — Z74.1 DEPENDENT FOR MOBILITY: ICD-10-CM

## 2022-11-01 DIAGNOSIS — R53.1 WEAKNESS GENERALIZED: Primary | ICD-10-CM

## 2022-11-01 PROCEDURE — 97110 THERAPEUTIC EXERCISES: CPT | Performed by: PHYSICAL THERAPIST

## 2022-11-01 PROCEDURE — 97112 NEUROMUSCULAR REEDUCATION: CPT | Performed by: PHYSICAL THERAPIST

## 2022-11-01 PROCEDURE — 97116 GAIT TRAINING THERAPY: CPT | Performed by: PHYSICAL THERAPIST

## 2022-11-01 NOTE — PROGRESS NOTES
"                   Physical Therapy Treatment Note     [address] [facility], [address]           [place], [address]             VISIT: 4          Subjective    Susan Anderson reports: she had a reaction to a medication.  She has an itchy rash all over her body.  She is on medication to address the itching and it is making her sleepy.          Objective    Treatment    Exercise 1  Exercise Name 1: gait with wheeled walker  Sets/Reps 1: 25' and 20'  Exercise 2  Exercise Name 2: sit<>stand  Sets/Reps 2: 5x during session (increased difficulty today)  Exercise 3  Exercise Name 3: seated overhead reach  Exercise 4  Exercise Name 4: seated behind head reach  Exercise 5  Exercise Name 5: sit<>supine each direction  Exercise 6  Exercise Name 6: propped on elbow to upright sitting  Sets/Reps 6: 5x each direction  Exercise 7  Exercise Name 7: abdominal crunches-3 way-difficult  Exercise 8  Exercise Name 8: LAQ  Equipment/Resistance 8: 2# cuffs  Exercise 9  Exercise Name 9: standing alternating step taps to 3\" surface  Exercise 10  Exercise Name 10: bridging  Exercise 11  Exercise Name 11: hooklying march  Exercise 12  Exercise Name 12: sidestep at counter top height table                 Assessment & Plan     Assessment    Assessment details: Pt is more tired than usual today related to medication.  She fatigues after only a few reps of each activity.      Plan  Plan details: Continue PT.  Increase strength and activity tolerance for general mobility skills.               Timed:  Manual Therapy:         mins  27857;  Therapeutic Exercise:    25     mins  55026;     Neuromuscular Fernando:    15    mins  69671;    Therapeutic Activity:          mins  96070;     Gait Training:      15     mins  54406;     Ultrasound:          mins  76757;    Electrical Stimulation:         mins  57480 ( );    Untimed:  Electrical Stimulation:         mins  85879 ( );  Mechanical Traction:         mins  11393;  Canalith Repositioning   "     mins  66767    Timed Treatment:   55   mins   Total Treatment:     55   mins      Peyton Galye, PT  Physical Therapist

## 2022-11-03 ENCOUNTER — HOSPITAL ENCOUNTER (OUTPATIENT)
Dept: PHYSICAL THERAPY | Facility: HOSPITAL | Age: 82
Setting detail: THERAPIES SERIES
Discharge: HOME | End: 2022-11-03

## 2022-11-03 ENCOUNTER — TREATMENT (OUTPATIENT)
Dept: PHYSICAL THERAPY | Facility: CLINIC | Age: 82
End: 2022-11-03

## 2022-11-03 DIAGNOSIS — S91.302D OPEN WOUND OF LEFT FOOT, SUBSEQUENT ENCOUNTER: Primary | ICD-10-CM

## 2022-11-03 DIAGNOSIS — Z74.1 DEPENDENT FOR MOBILITY: ICD-10-CM

## 2022-11-03 DIAGNOSIS — R53.1 WEAKNESS GENERALIZED: Primary | ICD-10-CM

## 2022-11-03 PROCEDURE — 97110 THERAPEUTIC EXERCISES: CPT | Performed by: PHYSICAL THERAPIST

## 2022-11-03 PROCEDURE — 97597 DBRDMT OPN WND 1ST 20 CM/<: CPT

## 2022-11-03 PROCEDURE — 97116 GAIT TRAINING THERAPY: CPT | Performed by: PHYSICAL THERAPIST

## 2022-11-03 PROCEDURE — 97112 NEUROMUSCULAR REEDUCATION: CPT | Performed by: PHYSICAL THERAPIST

## 2022-11-03 NOTE — PROGRESS NOTES
"                   Physical Therapy Treatment Note     [address] [facility], [address]           [place], [address]    VISIT: 5          Subjective    Susan Anderson reports: her eye is better.  She is not itching as much.  There steroid kept her awake last night. The wound on her foot is getting better.            Objective    Treatment    Exercise 1  Exercise Name 1: gait with wheeled walker  Sets/Reps 1: 25' x 2  Exercise 2  Exercise Name 2: sit<>stand  Sets/Reps 2: 5x during session  Exercise 3  Exercise Name 3: standing alternating overhead reach  Exercise 4  Exercise Name 4: seated chest press  Equipment/Resistance 4: yellow band  Exercise 5  Exercise Name 5: seated mid row  Equipment/Resistance 5: yellow band  Exercise 6  Exercise Name 6: propped on elbow to upright sitting  Sets/Reps 6: 5x each direction  Exercise 7  Exercise Name 7: abdominal crunches-3 way-difficult-manual cues, guiding direction of movement  Exercise 8  Equipment/Resistance 8: Bridging  Exercise 9  Exercise Name 9: standing alternating step taps to 3\" surface  Exercise 10  Exercise Name 10: seated march while leaning back on hands  Exercise 11  Exercise Name 11: modified rollback abdominal ex.                 Assessment & Plan     Assessment    Assessment details: Pt was anxious when lifting 1 hand off of walker initially.  She was able to very briefly lift both hands from walker with CG assist.  Max walking distance has been 25' this week.  Supine to sit remains very difficult and requires mod-max assist of 1.    Plan  Plan details: Continue PT.  Progress functional strength and mobility as tolerated.               Timed:  Manual Therapy:         mins  51127;  Therapeutic Exercise:    20     mins  83062;     Neuromuscular Fernando:    25    mins  85140;    Therapeutic Activity:          mins  50594;     Gait Training:      10     mins  02579;     Ultrasound:          mins  85968;    Electrical Stimulation:         mins  88652 (MC " );    Untimed:  Electrical Stimulation:         mins  54316 ( );  Mechanical Traction:         mins  50788;  Canalith Repositioning       mins  45123    Timed Treatment:   55   mins   Total Treatment:     55   mins      Peyton Gayle PT  Physical Therapist

## 2022-11-03 NOTE — THERAPY WOUND CARE TREATMENT
Outpatient Rehabilitation - Wound/Debridement Treatment Note   [place]     Patient Name: Susan Anderson  : 1940  MRN: [id number]  Today's Date: 11/3/2022                 Admit Date: 11/3/2022    Visit Dx:    ICD-10-CM ICD-9-CM   1. Open wound of left foot, subsequent encounter  S91.302D V58.89     892.0       Patient Active Problem List   Diagnosis   • Diabetic foot ulcer (Formerly [facility])   • PVD (peripheral vascular disease) (Formerly [facility])   • Osteomyelitis (Formerly [facility])   • Diabetes mellitus with neuropathy (Formerly [facility])   • Essential hypertension   • Stage 3a chronic kidney disease (Formerly [facility])   • Pyogenic inflammation of bone (Formerly [facility])   • Hyperglycemia   • Pneumonia due to infectious organism   • Mitral valve disease   • NICM (nonischemic cardiomyopathy) (Formerly [facility])   • Coronary artery disease involving native coronary artery of native heart without angina pectoris   • Dyslipidemia   • Chronic combined systolic and diastolic heart failure (Formerly [facility])   • Lumbar stenosis with neurogenic claudication   • Spondylosis of lumbar region without myelopathy or radiculopathy   • Spondylolisthesis, lumbar region   • Degeneration of lumbar or lumbosacral intervertebral disc   • Gait disturbance   • Physical deconditioning   • Sarcopenia   • Weakness   • Anemia, chronic disease   • Fall   • Dizziness   • Demand ischemia ([facility]ly [facility])   • Effusion of right knee   • Right knee pain   • Pressure injury of skin of sacral region   • Sleep apnea   • GIB (gastrointestinal bleeding)   • Vasovagal syncope   • Hypomagnesemia   • Syncope, unspecified syncope type   • Paroxysmal atrial fibrillation (Formerly [facility])        Past Medical History:   Diagnosis Date   • Arthritis    • Asthma  - double pneumonia    Currently on inhaler and nebulizer   • Atrial fibrillation ([facility]ly [facility]) 2022   • Cancer ([facility]ly [facility])     cervical cancer, skin cancer   • CHF (congestive heart failure) ([facility]ly [facility]) 2021   • Chronic kidney disease Related to diabetes   • Coronary artery disease 2021 DX for hear failure   •  Diabetes mellitus (HCC) 30 years    Seeing Dr. Lam 1st time Aug 19   • Gout    • Hx of colonoscopy    • Hyperlipidemia Reference current labs x 2-3yrs approx   • Hypertension 30 years   • Migraine    • Mitral valve disease    • Mitral valve disease    • Osteomyelitis (HCC)    • Peripheral neuropathy    • Sleep apnea    • Type 2 diabetes mellitus (HCC)     30 years        Past Surgical History:   Procedure Laterality Date   • ABDOMINAL HYSTERECTOMY W/SALPINGECTOMY     • AMPUTATION  Right great toe, 1st 1/3 metatarsal -Reg 4/14/21   • AORTAGRAM N/A 4/9/2021    Procedure: AORTAGRAM WITH OR WITHOUT RUNOFFS, WITH Co2;  Surgeon: Trey Forrest MD;  Location:  [facility];  Service: Vascular;  Laterality: N/A;   • AORTAGRAM N/A 9/28/2022    Procedure: CO2 ANGIOGRAM, LEFT SFA ANGIOPLASTY WITH DRUG ELUTING BALLOON, LEFT SFA STENT PLACEMENT ;  Surgeon: Trey Forrest MD;  Location:  [facility];  Service: Vascular;  Laterality: N/A;  FLUORO: 13.12  DOSE 162mGy  CONTRAST: Isovue 350: 15ml   • APPENDECTOMY     • BRAIN TUMOR EXCISION      laser surgery    • CARDIAC CATHETERIZATION N/A 6/21/2021    Procedure: LEFT HEART CATH;  Surgeon: Anjum Ceballos MD;  Location:  [facility];  Service: Cardiology;  Laterality: N/A;   • CATARACT EXTRACTION W/ INTRAOCULAR LENS  IMPLANT, BILATERAL     • CHOLECYSTECTOMY     • ENDOSCOPY N/A 10/7/2022    Procedure: ESOPHAGOGASTRODUODENOSCOPY;  Surgeon: Stef Veras MD;  Location:  [facility];  Service: Gastroenterology;  Laterality: N/A;   • EYE SURGERY     • HYSTERECTOMY     • TOE SURGERY     • TRANS METATARSAL AMPUTATION Right 4/14/2021    Procedure: AMPUTATION TRANS METATARSAL RIGHT GREAT TOE;  Surgeon: Valdez Finney MD;  Location:  [facility] OR;  Service: Vascular;  Laterality: Right;         EVALUATION   PT Ortho     Row Name 11/03/22 1300       Subjective Comments    Subjective Comments Daughter reports pt developed generalized rash, possibly medication  "reaction, so she has been put on a steroid dose-pack with some improvement.  States she will be out of town, so will have a caregiver changing the dressings until next week.  -       Subjective Pain    Able to rate subjective pain? yes  -    Pre-Treatment Pain Level 0  -    Post-Treatment Pain Level 0  -    Subjective Pain Comment DPN  -JM       Transfers    Comment, (Transfers) remained seated in w/c for tx  -JM          User Key  (r) = Recorded By, (t) = Taken By, (c) = Cosigned By    Initials Name Provider Type    Shi Mane, PT Physical Therapist                 [facility] Wound     Row Name 11/03/22 1300             Wound 10/08/22 1400 Left anterior foot Arterial Ulcer    Wound - Properties Group Placement Date: 10/08/22  - Placement Time: 1400  - Side: Left  - Orientation: anterior  - Location: foot  - Primary Wound Type: Arterial ulc  -    Wound Image Images linked: 1  -      Dressing Appearance intact;moist drainage  -      Base moist;pink;red;yellow;slough;epithelialization;granulating  increased granulation, new epithelial growth at edges  -      Periwound intact;dry;redness;warm  -      Periwound Temperature warm  -      Periwound Skin Turgor soft  -      Edges open;rolled/closed  -      Wound Length (cm) 1.4 cm  -      Wound Width (cm) 1.7 cm  -      Wound Depth (cm) 0.4 cm  deepest lateral aspect  -      Wound Surface Area (cm^2) 2.38 cm^2  -      Wound Volume (cm^3) 0.952 cm^3  -      Drainage Characteristics/Odor serosanguineous  -      Drainage Amount small  -      Care, Wound cleansed with;wound cleanser;debrided  -      Dressing Care dressing applied;collagen;antimicrobial agent applied;foam;border dressing  carolee, saline-moist HFBc, 4\" optifoam  -      Periwound Care cleansed with pH balanced cleanser;dry periwound area maintained  -      Retired Wound - Properties Group Placement Date: 10/08/22  - Placement Time: 1400  - Side: Left  " - Orientation: anterior  -MF Location: foot  -MF Primary Wound Type: Arterial ulc  -MF    Retired Wound - Properties Group Date first assessed: 10/08/22  - Time first assessed: 1400  -MF Side: Left  -MF Location: foot  -MF Primary Wound Type: Arterial ulc  -MF          User Key  (r) = Recorded By, (t) = Taken By, (c) = Cosigned By    Initials Name Provider Type    Avtar Small, PT Physical Therapist    Shi Mane, PT Physical Therapist                  WOUND DEBRIDEMENT  Total area of Debridement: 2cmsq  Debridement Site 1  Location- Site 1: L foot wound  Selective Debridement- Site 1: Wound Surface <20cmsq  Instruments- Site 1: #15, scapel, tweezers  Excised Tissue Description- Site 1: moderate, slough, other (comment) (biofilm, hypertrophic crust)  Bleeding- Site 1: scant, held pressure, 1 minute              Therapy Education     Row Name 11/03/22 1300             Therapy Education    Education Details Continue current dressings, change every 2-3 days.  -      Given Symptoms/condition management;Bandaging/dressing change  -      Program Reinforced;Modified  -VERONICA      How Provided Verbal;Demonstration  -VERONICA      Provided to Patient;Caregiver  daughter  -VERONICA      Level of Understanding Verbalized;Teach back education performed  -            User Key  (r) = Recorded By, (t) = Taken By, (c) = Cosigned By    Initials Name Provider Type    Shi Mane, PT Physical Therapist                Recommendation and Plan   PT Assessment/Plan     Row Name 11/03/22 1300          PT Assessment    Functional Limitations Performance in self-care ADL;Impaired gait;Other (comment)  wound management limitations  -     Impairments Impaired arterial circulation;Integumentary integrity;Pain;Impaired sensory integrity  -VERONICA     Assessment Comments Pt with good increase in granulation of wound with new epithelial growth at edges, still with biofilm and slough buildup requiring debridement.  Continue  current POC.  -     Rehab Potential Good  -     Patient/caregiver participated in establishment of treatment plan and goals Yes  -     Patient would benefit from skilled therapy intervention Yes  -        PT Plan    PT Frequency 1x/week  -     Physical Therapy Interventions (Optional Details) patient/family education;wound care  -     PT Plan Comments debridement, dressings management  -           User Key  (r) = Recorded By, (t) = Taken By, (c) = Cosigned By    Initials Name Provider Type    Shi Mane, PT Physical Therapist                Goals   PT OP Goals     Row Name 11/03/22 1300          Time Calculation    PT Goal Re-Cert Due Date 01/01/23  -           User Key  (r) = Recorded By, (t) = Taken By, (c) = Cosigned By    Initials Name Provider Type    Shi Mane, PT Physical Therapist                PT Goal Re-Cert Due Date: 01/01/23            Time Calculation: Start Time: 1300  Therapy Charges for Today     Code Description Service Date Service Provider Modifiers Qty    [id number] HC PIPE DEBRIDE OPEN WOUND UP TO 20CM 11/3/2022 Shi Cordoba, PT GP 1                  Shi Cordoba, PT  11/3/2022

## 2022-11-07 ENCOUNTER — READMISSION MANAGEMENT (OUTPATIENT)
Dept: CALL CENTER | Facility: HOSPITAL | Age: 82
End: 2022-11-07

## 2022-11-07 NOTE — OUTREACH NOTE
Medical Week 4 Survey    Flowsheet Row Responses   [facility] patient discharged from? [facility]   Does the patient have one of the following disease processes/diagnoses(primary or secondary)? Other   Week 4 attempt successful? Yes   Call start time 1033   Call end time 1034   Discharge diagnosis Gastrointestinal bleeding   Meds reviewed with patient/caregiver? Yes   Is the patient taking all medications as directed (includes completed medication regime)? Yes   Has the patient kept scheduled appointments due by today? Yes   Is the patient still receiving Home Health Services? N/A   Psychosocial issues? No   What is the patient's perception of their health status since discharge? Improving   Is the patient/caregiver able to teach back signs and symptoms related to disease process for when to call PCP? Yes   Is the patient/caregiver able to teach back signs and symptoms related to disease process for when to call 911? Yes   Is the patient/caregiver able to teach back the hierarchy of who to call/visit for symptoms/problems? PCP, Specialist, Home health nurse, Urgent Care, ED, 911 Yes   Week 4 Call Completed? Yes   Would the patient like one additional call? No   Graduated Yes   Wrap up additional comments Pt doing better. No bleeding noted.           FANG VIGIL - Registered Nurse

## 2022-11-08 DIAGNOSIS — R21 RASH: Primary | ICD-10-CM

## 2022-11-08 PROBLEM — K22.10 ULCER OF ESOPHAGUS WITHOUT BLEEDING: Status: ACTIVE | Noted: 2022-11-08

## 2022-11-09 ENCOUNTER — TREATMENT (OUTPATIENT)
Dept: PHYSICAL THERAPY | Facility: CLINIC | Age: 82
End: 2022-11-09

## 2022-11-09 ENCOUNTER — OFFICE VISIT (OUTPATIENT)
Dept: CARDIAC SURGERY | Facility: CLINIC | Age: 82
End: 2022-11-09

## 2022-11-09 DIAGNOSIS — E11.42 DIABETIC PERIPHERAL NEUROPATHY ASSOCIATED WITH TYPE 2 DIABETES MELLITUS: ICD-10-CM

## 2022-11-09 DIAGNOSIS — R53.1 WEAKNESS GENERALIZED: Primary | ICD-10-CM

## 2022-11-09 DIAGNOSIS — I73.9 PVD (PERIPHERAL VASCULAR DISEASE): Primary | ICD-10-CM

## 2022-11-09 DIAGNOSIS — I96 GANGRENE OF FOOT: ICD-10-CM

## 2022-11-09 DIAGNOSIS — Z74.1 DEPENDENT FOR MOBILITY: ICD-10-CM

## 2022-11-09 PROCEDURE — 97112 NEUROMUSCULAR REEDUCATION: CPT | Performed by: PHYSICAL THERAPIST

## 2022-11-09 PROCEDURE — 97110 THERAPEUTIC EXERCISES: CPT | Performed by: PHYSICAL THERAPIST

## 2022-11-09 PROCEDURE — 99024 POSTOP FOLLOW-UP VISIT: CPT | Performed by: THORACIC SURGERY (CARDIOTHORACIC VASCULAR SURGERY)

## 2022-11-09 PROCEDURE — 97116 GAIT TRAINING THERAPY: CPT | Performed by: PHYSICAL THERAPIST

## 2022-11-09 NOTE — PROGRESS NOTES
Patient Information  Susan Anderson                                                                                          [address] [place] [address]      1940  [unfilled]  [unfilled]    Chief Complaint   Patient presents with   • Hospital Follow Up Visit     Hosp follow up s/p AA w/ runoff and left SFA stent or 9/28/22. Pt here today with daughter for follow up after  AA w/ runoff SFA stent.        History of Present Illness: Patient seen today for follow-up of drug-eluting angioplasty/stent of the left superficial moderate stenosis per Dr. Forrest and myself on September 20, 2022.  This was done for an ulceration on the dorsum of her left foot over the area of the second toe metatarsal area.  She was eventually discharged home on antibiotics per infectious disease.  She is back now here for routine follow-up visit.  Recently has had a generalized skin rash which is felt secondary to possibly  a drug interaction.  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     Physical Exam this dorsal foot ulceration of the left foot over the area of the second toe metatarsal area appears to be granulating slowly.  The foot is warm and well-perfused  She does have generalized skin rash present  Prior right great toe transmetatarsal amputation is completely healed at this time  Lab/other results:    Assessment: #1.  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  2.  Status post drug-eluting angioplasty/stent of the mid SFA lesion of the left lower extremity done on September 28, 2022 appears to be functioning at this time with warm foot and good  capillary fill  Plan: We will plan to see the patient back in 2 months for follow-up visit    Valdez Finney M.D.

## 2022-11-09 NOTE — PROGRESS NOTES
Physical Therapy Treatment Note     [address], [address]           [place], [place], [address]    VISIT: 6          Subjective    Susan Anderson reports: she wants to try riding the bike today.  She has been trying to stand and walk more at home.        Objective    Treatment    Exercise 1  Exercise Name 1: gait with wheeled walker  Sets/Reps 1: 45x1  Exercise 2  Exercise Name 2: sit<>stand  Sets/Reps 2: 3x during session (no direct assistance required, stand-by only)  Exercise 3  Exercise Name 3: bridging  Sets/Reps 3: 2x10, 5 sec hold  Exercise 4  Exercise Name 4: seated chest press  Equipment/Resistance 4: yellow band (tried red band-too difficult for R UE)  Exercise 5  Exercise Name 5: SAQ  Equipment/Resistance 5: 2# cuffs  Time 5: 20x each  Exercise 6  Exercise Name 6: marching in hooklying  Equipment/Resistance 6: 2# cuffs  Sets/Reps 6: 15x each  Exercise 7  Exercise Name 7: hooklying ankle pumps  Sets/Reps 7: 10x  Exercise 8  Exercise Name 8: sit<>supine (assist to initiate side to sit motion)  Exercise 9  Exercise Name 9: bicycle  Time 9: 10'                 Assessment & Plan     Assessment    Assessment details: Pt demonstrates improved energy and motivation today.  Her daughter says she has slept better recently.  She increased her walking distance by 20' more than any previous attempt during treatment sessions.    Plan  Plan details: Continue PT.  Progress functional strength and mobility training as tolerated.               Timed:  Manual Therapy:         mins  81104;  Therapeutic Exercise:    30     mins  90450;     Neuromuscular Fernando:    15    mins  04746;    Therapeutic Activity:          mins  02019;     Gait Trainin       mins  38877;     Ultrasound:          mins  87661;    Electrical Stimulation:         mins  91172 ( );    Untimed:  Electrical Stimulation:         mins  72204 ( );  Mechanical Traction:         mins  11254;  Canalith Repositioning        mins  86113    Timed Treatment:   53   mins   Total Treatment:     53   mins      Peyton Gayle, PT  Physical Therapist

## 2022-11-11 ENCOUNTER — HOSPITAL ENCOUNTER (OUTPATIENT)
Dept: PHYSICAL THERAPY | Facility: HOSPITAL | Age: 82
Setting detail: THERAPIES SERIES
Discharge: HOME | End: 2022-11-11

## 2022-11-11 DIAGNOSIS — S91.302D OPEN WOUND OF LEFT FOOT, SUBSEQUENT ENCOUNTER: Primary | ICD-10-CM

## 2022-11-11 PROCEDURE — 97597 DBRDMT OPN WND 1ST 20 CM/<: CPT

## 2022-11-11 NOTE — THERAPY WOUND CARE TREATMENT
Outpatient Rehabilitation - Wound/Debridement Treatment Note   Edgar     Patient Name: Susan Anderson  : 1940  MRN: [id number]  Today's Date: 2022                 Admit Date: 2022    Visit Dx:    ICD-10-CM ICD-9-CM   1. Open wound of left foot, subsequent encounter  S91.302D V58.89     892.0       Patient Active Problem List   Diagnosis   • Diabetic foot ulcer ([facility])   • PVD (peripheral vascular disease) ([facility])   • Osteomyelitis ([facility])   • Diabetes mellitus with neuropathy ([facility])   • Essential hypertension   • Stage 3a chronic kidney disease ([facility])   • Pyogenic inflammation of bone ([facility])   • Hyperglycemia   • Pneumonia due to infectious organism   • Mitral valve disease   • NICM (nonischemic cardiomyopathy) ([facility])   • Coronary artery disease involving native coronary artery of native heart without angina pectoris   • Dyslipidemia   • Chronic combined systolic and diastolic heart failure ([facility])   • Lumbar stenosis with neurogenic claudication   • Spondylosis of lumbar region without myelopathy or radiculopathy   • Spondylolisthesis, lumbar region   • Degeneration of lumbar or lumbosacral intervertebral disc   • Gait disturbance   • Physical deconditioning   • Sarcopenia   • Weakness   • Anemia, chronic disease   • Fall   • Dizziness   • Demand ischemia ([facility])   • Effusion of right knee   • Right knee pain   • Pressure injury of skin of sacral region   • Sleep apnea   • GIB (gastrointestinal bleeding)   • Vasovagal syncope   • Hypomagnesemia   • Syncope, unspecified syncope type   • Paroxysmal atrial fibrillation ([facility])   • Ulcer of esophagus without bleeding        Past Medical History:   Diagnosis Date   • Arthritis    • Asthma / - double pneumonia    Currently on inhaler and nebulizer   • Atrial fibrillation ([facility]) 2022   • Cancer ([facility])     cervical cancer, skin cancer   • CHF (congestive heart failure) ([facility]) 2021   • Chronic kidney disease Related to diabetes   • Coronary artery  disease 6/4/2021 DX for hear failure   • Diabetes mellitus ([facility]) 30 years    Seeing Dr. Lam 1st time Aug 19   • Gout    • Hx of colonoscopy    • Hyperlipidemia Reference current labs x 2-3yrs approx   • Hypertension 30 years   • Migraine    • Mitral valve disease    • Mitral valve disease    • Osteomyelitis ([facility])    • Peripheral neuropathy    • Sleep apnea    • Type 2 diabetes mellitus ([facility])     30 years        Past Surgical History:   Procedure Laterality Date   • ABDOMINAL HYSTERECTOMY W/SALPINGECTOMY     • AMPUTATION  Right great toe, 1st 1/3 metatarsal -[facility] 4/14/21   • AORTAGRAM N/A 4/9/2021    Procedure: AORTAGRAM WITH OR WITHOUT RUNOFFS, WITH Co2;  Surgeon: Trey Forrest MD;  Location:  [facility];  Service: Vascular;  Laterality: N/A;   • AORTAGRAM N/A 9/28/2022    Procedure: CO2 ANGIOGRAM, LEFT SFA ANGIOPLASTY WITH DRUG ELUTING BALLOON, LEFT SFA STENT PLACEMENT ;  Surgeon: Trey Forrest MD;  Location:  [facility];  Service: Vascular;  Laterality: N/A;  FLUORO: 13.12  DOSE 162mGy  CONTRAST: Isovue 350: 15ml   • APPENDECTOMY     • BRAIN TUMOR EXCISION      laser surgery    • CARDIAC CATHETERIZATION N/A 6/21/2021    Procedure: LEFT HEART CATH;  Surgeon: Anjum Ceballos MD;  Location:  [facility];  Service: Cardiology;  Laterality: N/A;   • CATARACT EXTRACTION W/ INTRAOCULAR LENS  IMPLANT, BILATERAL     • CHOLECYSTECTOMY     • ENDOSCOPY N/A 10/7/2022    Procedure: ESOPHAGOGASTRODUODENOSCOPY;  Surgeon: Stef Veras MD;  Location:  [facility];  Service: Gastroenterology;  Laterality: N/A;   • EYE SURGERY     • HYSTERECTOMY     • TOE SURGERY     • TRANS METATARSAL AMPUTATION Right 4/14/2021    Procedure: AMPUTATION TRANS METATARSAL RIGHT GREAT TOE;  Surgeon: Valdez Finney MD;  Location:  [facility];  Service: Vascular;  Laterality: Right;         EVALUATION   PT Ortho     Row Name 11/11/22 1400       Subjective Comments    Subjective Comments They saw Dr. Finney this week,  "who is pleased with her progress. No other complaints or changes.  -       Subjective Pain    Able to rate subjective pain? yes  -    Pre-Treatment Pain Level 0  -MC    Post-Treatment Pain Level 0  -    Subjective Pain Comment DPN  -MC       Transfers    Comment, (Transfers) remained seated for tx  -          User Key  (r) = Recorded By, (t) = Taken By, (c) = Cosigned By    Initials Name Provider Type     Lucila Ulrich PT Physical Therapist                 [facility] Wound     Row Name 11/11/22 1400             Wound 10/08/22 1400 Left anterior foot Arterial Ulcer    Wound - Properties Group Placement Date: 10/08/22  - Placement Time: 1400  - Side: Left  - Orientation: anterior  - Location: foot  -MF Primary Wound Type: Arterial ulc  -    Wound Image Images linked: 1  -      Dressing Appearance intact;moist drainage  -      Base moist;pink;red;yellow;slough;epithelialization;granulating  increased granulation, new epithelial growth at proximal and distal edges  -      Periwound intact;dry;redness;warm  -      Periwound Temperature warm  -      Periwound Skin Turgor soft  -      Edges open;rolled/closed  -      Wound Length (cm) 1 cm  -      Wound Width (cm) 1.8 cm  -      Wound Depth (cm) 0.4 cm  partially obscured laterally  -      Wound Surface Area (cm^2) 1.8 cm^2  -      Wound Volume (cm^3) 0.72 cm^3  -      Drainage Characteristics/Odor serosanguineous  -      Drainage Amount small  -      Care, Wound cleansed with;wound cleanser;debrided  -      Dressing Care dressing applied;silver impregnated;collagen;antimicrobial agent applied;foam;low-adherent;border dressing  carolee, saline-moist HFBc, 4\" optifoam  -      Periwound Care cleansed with pH balanced cleanser;dry periwound area maintained  -      Retired Wound - Properties Group Placement Date: 10/08/22  - Placement Time: 1400  -MF Side: Left  - Orientation: anterior  - Location: foot  -MF Primary " Wound Type: Arterial ulc  -MF    Retired Wound - Properties Group Date first assessed: 10/08/22  - Time first assessed: 1400  - Side: Left  - Location: foot  -MF Primary Wound Type: Arterial ulc  -          User Key  (r) = Recorded By, (t) = Taken By, (c) = Cosigned By    Initials Name Provider Type    Avtar Small, PT Physical Therapist    Lucila Mohan, SAHIL Physical Therapist                  WOUND DEBRIDEMENT  Total area of Debridement: 2 cm2  Debridement Site 1  Location- Site 1: L foot wound  Selective Debridement- Site 1: Wound Surface <20cmsq  Instruments- Site 1: #15, scapel, tweezers  Excised Tissue Description- Site 1: minimum, slough  Bleeding- Site 1: scant, held pressure, 1 minute              Therapy Education     Row Name 11/11/22 1400             Therapy Education    Education Details Continue current POC  -MC      Given Symptoms/condition management;Bandaging/dressing change  -      Program Reinforced;Modified  -      How Provided Verbal;Demonstration  -      Provided to Patient;Caregiver  daughter  -      Level of Understanding Verbalized;Teach back education performed  -            User Key  (r) = Recorded By, (t) = Taken By, (c) = Cosigned By    Initials Name Provider Type    Lucila Mohan, PT Physical Therapist                Recommendation and Plan   PT Assessment/Plan     Row Name 11/11/22 1400          PT Assessment    Functional Limitations Performance in self-care ADL;Impaired gait;Other (comment)  wound management limitations  -     Impairments Impaired arterial circulation;Integumentary integrity;Pain;Impaired sensory integrity  -     Assessment Comments Pt with notable reduction in wound length since last assessment due to new epithelialization at both the proximal and distal wound edges. PT able to debride some additional slough from the lateral aspect, which remains the deepest area. Pt is progressing well with the current POC.  -MC      Rehab Potential Good  -     Patient/caregiver participated in establishment of treatment plan and goals Yes  -     Patient would benefit from skilled therapy intervention Yes  -        PT Plan    PT Frequency 1x/week  -     Physical Therapy Interventions (Optional Details) wound care  -     PT Plan Comments debridement, dressings  -           User Key  (r) = Recorded By, (t) = Taken By, (c) = Cosigned By    Initials Name Provider Type     Lucila Ulrich, PT Physical Therapist                Goals   PT OP Goals     Row Name 11/11/22 1420          Time Calculation    PT Goal Re-Cert Due Date 01/01/23  -           User Key  (r) = Recorded By, (t) = Taken By, (c) = Cosigned By    Initials Name Provider Type     Lucila Ulrich, PT Physical Therapist                PT Goal Re-Cert Due Date: 01/01/23            Time Calculation: Start Time: 1315  Untimed Charges  39016-Vziqmjiac debridement: 20  Total Minutes  Untimed Charges Total Minutes: 20   Total Minutes: 20  Therapy Charges for Today     Code Description Service Date Service Provider Modifiers Qty    [id number]  PIPE DEBRIDE OPEN WOUND UP TO 20CM 11/11/2022 Lucila Ulrich, PT GP 1                  Lucila Ulrich, PT  11/11/2022

## 2022-11-15 ENCOUNTER — TREATMENT (OUTPATIENT)
Dept: PHYSICAL THERAPY | Facility: CLINIC | Age: 82
End: 2022-11-15

## 2022-11-15 DIAGNOSIS — Z74.1 DEPENDENT FOR MOBILITY: ICD-10-CM

## 2022-11-15 DIAGNOSIS — R53.1 WEAKNESS GENERALIZED: Primary | ICD-10-CM

## 2022-11-15 PROCEDURE — 97112 NEUROMUSCULAR REEDUCATION: CPT | Performed by: PHYSICAL THERAPIST

## 2022-11-15 PROCEDURE — 97110 THERAPEUTIC EXERCISES: CPT | Performed by: PHYSICAL THERAPIST

## 2022-11-15 PROCEDURE — 97116 GAIT TRAINING THERAPY: CPT | Performed by: PHYSICAL THERAPIST

## 2022-11-15 NOTE — PROGRESS NOTES
Physical Therapy Treatment Note     [address], [address]           [place], [place], [address]    VISIT: 7          Subjective    Susan Anderson reports: her dog  last weekend.  She has not rested well the past few nights.  She has been able to get herself out of bed the past 2 days.      Objective    Treatment    Exercise 1  Exercise Name 1: gait with wheeled walker  Sets/Reps 1: 45', 25', and 15'  Exercise 2  Exercise Name 2: sit<>stand  Sets/Reps 2: 5x during session (no direct assistance required, stand-by only)  Exercise 3  Exercise Name 3: bridging  Sets/Reps 3: 20x, 5 second hold  Exercise 4  Exercise Name 4: seated multi-angle reach with weightless ball  Exercise 5  Exercise Name 5: SAQ  Equipment/Resistance 5: 2# cuffs  Time 5: 20x each  Exercise 6  Exercise Name 6: marching in hooklying  Equipment/Resistance 6: 2# cuffs  Sets/Reps 6: 20x each  Exercise 7  Exercise Name 7: seated ankle pumps  Sets/Reps 7: 10x  Exercise 8  Exercise Name 8: sit<>supine (assist to initiate side to sit motion)  Exercise 9  Exercise Name 9: standing-letting go of walker (tolerates only a few seconds)  Exercise 10  Exercise Name 10: standing on foam  Exercise 11  Exercise Name 11: 4' step taps with walker support  Exercise 12  Exercise Name 12: 3 way abdominal crunches  Exercise 13  Exercise Name 13: clamshells  Sets/Reps 13: 2x10 each  Exercise 14  Exercise Name 14: sidelying hip abduction (manual assist required L more than R)  Sets/Reps 14: 2x10                 Assessment & Plan     Assessment    Assessment details: Pt is gradually tolerating more challenge with exercises in clinic, but continues to have limited endurance for sustained activity.  L hip weakness makes gait and functional activities in standing very difficult, as pt cannot effectively support weight on LLE to allow R LE movement.    Plan  Plan details: Continue PT.  Progress strength and functional mobility skills as tolerated.                Timed:  Manual Therapy:         mins  54312;  Therapeutic Exercise:    30     mins  40770;     Neuromuscular Fernando:    15    mins  42535;    Therapeutic Activity:          mins  76120;     Gait Training:      15     mins  05930;     Ultrasound:          mins  19443;    Electrical Stimulation:         mins  77533 ( );    Untimed:  Electrical Stimulation:         mins  56119 ( );  Mechanical Traction:         mins  88502;  Canalith Repositioning       mins  11859    Timed Treatment:   60   mins   Total Treatment:     60   mins      Peyton Gayle PT  Physical Therapist

## 2022-11-17 ENCOUNTER — HOSPITAL ENCOUNTER (OUTPATIENT)
Dept: PHYSICAL THERAPY | Facility: HOSPITAL | Age: 82
Setting detail: THERAPIES SERIES
Discharge: HOME | End: 2022-11-17

## 2022-11-17 DIAGNOSIS — S91.302D OPEN WOUND OF LEFT FOOT, SUBSEQUENT ENCOUNTER: Primary | ICD-10-CM

## 2022-11-17 PROCEDURE — 97597 DBRDMT OPN WND 1ST 20 CM/<: CPT

## 2022-11-18 ENCOUNTER — APPOINTMENT (OUTPATIENT)
Dept: PHYSICAL THERAPY | Facility: HOSPITAL | Age: 82
End: 2022-11-18

## 2022-11-22 ENCOUNTER — TREATMENT (OUTPATIENT)
Dept: PHYSICAL THERAPY | Facility: CLINIC | Age: 82
End: 2022-11-22

## 2022-11-22 ENCOUNTER — TELEPHONE (OUTPATIENT)
Dept: CARDIOLOGY | Facility: CLINIC | Age: 82
End: 2022-11-22

## 2022-11-22 DIAGNOSIS — R53.1 WEAKNESS GENERALIZED: Primary | ICD-10-CM

## 2022-11-22 DIAGNOSIS — Z74.1 DEPENDENT FOR MOBILITY: ICD-10-CM

## 2022-11-22 PROCEDURE — 97110 THERAPEUTIC EXERCISES: CPT | Performed by: PHYSICAL THERAPIST

## 2022-11-22 PROCEDURE — 97116 GAIT TRAINING THERAPY: CPT | Performed by: PHYSICAL THERAPIST

## 2022-11-22 PROCEDURE — 97530 THERAPEUTIC ACTIVITIES: CPT | Performed by: PHYSICAL THERAPIST

## 2022-11-22 NOTE — TELEPHONE ENCOUNTER
Some dyspnea on exertion is normal. Continue to take diuretic as prescribed, monitor for edema and weight gain. Call our office if significant worsening in edema or shortness of breath.

## 2022-11-22 NOTE — TELEPHONE ENCOUNTER
----- Message from Josee Martines RN sent at 11/17/2022  4:19 PM EST -----  Regarding: FW: Occasional shortness of breath this week  Contact: [phone]    ----- Message -----  From: Susan Anderson  Sent: 11/17/2022   1:13 PM EST  [facility]  Subject: Occasional shortness of breath this week         No other symptoms. Weight is stable. Legs are good. Yes taking Bumex and it seems to be working.

## 2022-11-22 NOTE — PROGRESS NOTES
"                   Physical Therapy Treatment Note     [address], [address]           [place], [address]    VISIT: 8          Subjective    Susan Anderson reports: she is discouraged.  She has no energy today.  Her daughter reports that pt did not feel well for several days after her flu shot and did not  around the house as much.           Objective    Treatment    Exercise 1  Exercise Name 1: gait with wheeled walker  Sets/Reps 1: 15'x2 and 25'x1 (extra encouragement required during gait today, cues for upright posture, hip/back muscle activation)  Exercise 2  Exercise Name 2: sit<>stand  Sets/Reps 2: 6x during session  Exercise 3  Exercise Name 3: standing, alternating lifting each arm off of walker  Exercise 4  Exercise Name 4: seated multi-angle reach with weightless ball  Exercise 5  Exercise Name 5: SAQ  Equipment/Resistance 5: 2# cuffs  Time 5: 20x each  Exercise 6  Exercise Name 6: \"modified rollback\" from sitting for abdominal activation  Exercise 7  Exercise Name 7: mini-squats at walker  Exercise 8  Exercise Name 8: sit<>supine (assist to initiate side to sit motion)  Sets/Reps 8: 3x                 Assessment & Plan     Assessment    Assessment details: Pt presents with lower than usual energy level today.  She required extra time and encouragement for all activities in clinic today.      Plan  Plan details: Reassess at next session.  Continue to work toward improving general strength and functional mobility skills for increasing independence at home.               Timed:  Manual Therapy:         mins  66575;  Therapeutic Exercise:    20     mins  34832;     Neuromuscular Fernando:        mins  05040;    Therapeutic Activity:     15     mins  05233;     Gait Training:      15     mins  68599;     Ultrasound:          mins  78803;    Electrical Stimulation:         mins  07643 ( );    Untimed:  Electrical Stimulation:         mins  22657 ( );  Mechanical Traction:         mins  " 38630;  Canalith Repositioning       mins  30901    Timed Treatment:   50   mins   Total Treatment:     50   mins      Peyton Gayle, PT  Physical Therapist

## 2022-11-27 ENCOUNTER — PATIENT MESSAGE (OUTPATIENT)
Dept: INTERNAL MEDICINE | Facility: CLINIC | Age: 82
End: 2022-11-27

## 2022-11-27 RX ORDER — PANTOPRAZOLE SODIUM 40 MG/1
40 TABLET, DELAYED RELEASE ORAL
Qty: 60 TABLET | Refills: 0 | Status: SHIPPED | OUTPATIENT
Start: 2022-11-27 | End: 2022-11-28 | Stop reason: SDUPTHER

## 2022-11-28 RX ORDER — PANTOPRAZOLE SODIUM 40 MG/1
40 TABLET, DELAYED RELEASE ORAL
Qty: 60 TABLET | Refills: 11 | Status: ON HOLD | OUTPATIENT
Start: 2022-11-28 | End: 2023-01-03

## 2022-11-28 NOTE — TELEPHONE ENCOUNTER
From: Susan Anderson  To: Arleen Doherty MD  Sent: 11/27/2022 8:33 PM EST  Subject: Protonix Refill Needed    Dr. Ceballos instructed me to have my PCP refill this in the future. I thought I had brought it on my list for refill last in person visit but the pharmacy did not have record. Could you please call this in to [facility] on [facility] [facility] [phone].     Pantoprazole sod Dr 40mg t   Take 1 tablet by mouth 2 times a day before meals   Qty 60

## 2022-12-01 ENCOUNTER — HOSPITAL ENCOUNTER (OUTPATIENT)
Dept: PHYSICAL THERAPY | Facility: HOSPITAL | Age: 82
Setting detail: THERAPIES SERIES
Discharge: HOME | End: 2022-12-01
Payer: MEDICARE

## 2022-12-01 DIAGNOSIS — S91.302D OPEN WOUND OF LEFT FOOT, SUBSEQUENT ENCOUNTER: ICD-10-CM

## 2022-12-01 DIAGNOSIS — S91.301D OPEN WOUND OF RIGHT FOOT WITH COMPLICATION, SUBSEQUENT ENCOUNTER: Primary | ICD-10-CM

## 2022-12-01 PROCEDURE — 97597 DBRDMT OPN WND 1ST 20 CM/<: CPT

## 2022-12-01 RX ORDER — LANCETS 30 GAUGE
1 EACH MISCELLANEOUS 3 TIMES DAILY
Qty: 100 EACH | Refills: 5 | Status: SHIPPED | OUTPATIENT
Start: 2022-12-01 | End: 2023-02-16 | Stop reason: SDUPTHER

## 2022-12-01 NOTE — TELEPHONE ENCOUNTER
Caller: Susan Anderson    Relationship: Self    Best call back number: [phone]    Requested Prescriptions:   LANCETS     Pharmacy where request should be sent: [facility]. [place] [address] - [phone]  - [phone] FX     Additional details provided by patient: THE PATIENT WILL BE OUT OF LANCETS TODAY     Does the patient have less than a 3 day supply:  [x] Yes  [] No    Would you like a call back once the refill request has been completed: [x] Yes [] No    If the office needs to give you a call back, can they leave a voicemail: [x] Yes [] No    Kenyon Martinez Rep   12/01/22 09:03 EST

## 2022-12-02 NOTE — THERAPY WOUND CARE TREATMENT
Outpatient Rehabilitation - Wound/Debridement Treatment Note   [place]     Patient Name: Susan Anderson  : 1940  MRN: [id number]  Today's Date: 2022             L dorsal wound      L posterior heel        R posterior heel        Admit Date: 2022    Visit Dx:    ICD-10-CM ICD-9-CM   1. Open wound of right foot with complication, subsequent encounter  S91.301D V58.89     892.1   2. Open wound of left foot, subsequent encounter  S91.302D V58.89     892.0       Patient Active Problem List   Diagnosis   • Diabetic foot ulcer ([facility])   • PVD (peripheral vascular disease) ([facility])   • Osteomyelitis ([facility])   • Diabetes mellitus with neuropathy ([facility])   • Essential hypertension   • Stage 3a chronic kidney disease ([facility])   • Pyogenic inflammation of bone ([facility])   • Hyperglycemia   • Pneumonia due to infectious organism   • Mitral valve disease   • NICM (nonischemic cardiomyopathy) ([facility])   • Coronary artery disease involving native coronary artery of native heart without angina pectoris   • Dyslipidemia   • Chronic combined systolic and diastolic heart failure ([facility])   • Lumbar stenosis with neurogenic claudication   • Spondylosis of lumbar region without myelopathy or radiculopathy   • Spondylolisthesis, lumbar region   • Degeneration of lumbar or lumbosacral intervertebral disc   • Gait disturbance   • Physical deconditioning   • Sarcopenia   • Weakness   • Anemia, chronic disease   • Fall   • Dizziness   • Demand ischemia ([facility])   • Effusion of right knee   • Right knee pain   • Pressure injury of skin of sacral region   • Sleep apnea   • GIB (gastrointestinal bleeding)   • Vasovagal syncope   • Hypomagnesemia   • Syncope, unspecified syncope type   • Paroxysmal atrial fibrillation ([facility])   • Ulcer of esophagus without bleeding        Past Medical History:   Diagnosis Date   • Arthritis    • Asthma / - double pneumonia    Currently on inhaler and nebulizer   • Atrial fibrillation ([facility]) 2022   • Cancer  (HCC)     cervical cancer, skin cancer   • CHF (congestive heart failure) (HCC) June 4, 2021   • Chronic kidney disease Related to diabetes   • Coronary artery disease 6/4/2021 DX for hear failure   • Diabetes mellitus (HCC) 30 years    Seeing Dr. Lam 1st time Aug 19   • Gout    • Hx of colonoscopy    • Hyperlipidemia Reference current labs x 2-3yrs approx   • Hypertension 30 years   • Migraine    • Mitral valve disease    • Mitral valve disease    • Osteomyelitis (HCC)    • Peripheral neuropathy    • Sleep apnea    • Type 2 diabetes mellitus (HCC)     30 years        Past Surgical History:   Procedure Laterality Date   • ABDOMINAL HYSTERECTOMY W/SALPINGECTOMY     • AMPUTATION  Right great toe, 1st 1/3 metatarsal -Reg 4/14/21   • AORTAGRAM N/A 4/9/2021    Procedure: AORTAGRAM WITH OR WITHOUT RUNOFFS, WITH Co2;  Surgeon: Trey Forrest MD;  Location:  [facility];  Service: Vascular;  Laterality: N/A;   • AORTAGRAM N/A 9/28/2022    Procedure: CO2 ANGIOGRAM, LEFT SFA ANGIOPLASTY WITH DRUG ELUTING BALLOON, LEFT SFA STENT PLACEMENT ;  Surgeon: Trey Forrest MD;  Location:  [facility];  Service: Vascular;  Laterality: N/A;  FLUORO: 13.12  DOSE 162mGy  CONTRAST: Isovue 350: 15ml   • APPENDECTOMY     • BRAIN TUMOR EXCISION      laser surgery    • CARDIAC CATHETERIZATION N/A 6/21/2021    Procedure: LEFT HEART CATH;  Surgeon: Anjum Ceballos MD;  Location:  [facility] CATH INVASIVE LOCATION;  Service: Cardiology;  Laterality: N/A;   • CATARACT EXTRACTION W/ INTRAOCULAR LENS  IMPLANT, BILATERAL     • CHOLECYSTECTOMY     • ENDOSCOPY N/A 10/7/2022    Procedure: ESOPHAGOGASTRODUODENOSCOPY;  Surgeon: Stef Veras MD;  Location:  [facility] ENDOSCOPY;  Service: Gastroenterology;  Laterality: N/A;   • EYE SURGERY     • HYSTERECTOMY     • TOE SURGERY     • TRANS METATARSAL AMPUTATION Right 4/14/2021    Procedure: AMPUTATION TRANS METATARSAL RIGHT GREAT TOE;  Surgeon: Valdez Finney MD;  Location:  [facility];  Service:  "Vascular;  Laterality: Right;         EVALUATION   PT Ortho     Row Name 12/01/22 1355       Subjective Comments    Subjective Comments Pt has been having some B heel pain and earlier this week her caregiver noticed some potential skin breakdown. Her dtr is concerned about DTPIs to the heels. No issues with the dorsal wound.  -MC       Subjective Pain    Able to rate subjective pain? yes  -MC    Pre-Treatment Pain Level 0  -MC    Post-Treatment Pain Level 0  -MC    Subjective Pain Comment DPN  -MC       Transfers    Comment, (Transfers) remained seated for tx  -          User Key  (r) = Recorded By, (t) = Taken By, (c) = Cosigned By    Initials Name Provider Type    Lucila Mohan PT Physical Therapist                 LDA Wound     Row Name 12/01/22 1355             Wound 12/01/22 1355 Left posterior heel Fissure    Wound - Properties Group Placement Date: 12/01/22  - Placement Time: 1355  - Present on Hospital Admission: N  -MC Side: Left  - Orientation: posterior  -MC Location: heel  -MC Primary Wound Type: Fissure  -MC    Wound Image Images linked: 2  -MC      Dressing Appearance open to air  -      Base moist;pink;red  -      Periwound intact;dry;pink;blanchable  -      Periwound Temperature warm  -      Periwound Skin Turgor soft  -      Edges irregular;open  -      Drainage Characteristics/Odor serous  -MC      Drainage Amount scant  -      Care, Wound cleansed with;wound cleanser;debrided;honey applied  -      Dressing Care dressing applied;low-adherent;foam;border dressing  honey, 6\" optifoam  -      Periwound Care cleansed with pH balanced cleanser;dry periwound area maintained  -      Retired Wound - Properties Group Placement Date: 12/01/22  -MC Placement Time: 1355  -MC Present on Hospital Admission: N  -MC Side: Left  - Orientation: posterior  -MC Location: heel  -MC Primary Wound Type: Fissure  -MC    Retired Wound - Properties Group Date first assessed: 12/01/22  " "- Time first assessed: 1355  - Present on Hospital Admission: N  -MC Side: Left  -MC Location: heel  -MC Primary Wound Type: Fissure  -MC       Wound 12/01/22 1355 Right posterior heel Fissure    Wound - Properties Group Placement Date: 12/01/22  - Placement Time: 1355  - Present on Hospital Admission: N  -MC Side: Right  -MC Orientation: posterior  -MC Location: heel  -MC Primary Wound Type: Fissure  -MC    Wound Image Images linked: 2  -MC      Dressing Appearance open to air  -      Base moist;pink;scab  -MC      Periwound intact;dry;blanchable;pink  -MC      Periwound Temperature warm  -      Periwound Skin Turgor soft  -      Edges irregular;open  -      Drainage Characteristics/Odor serous  -MC      Drainage Amount scant  -      Care, Wound cleansed with;wound cleanser;debrided;honey applied  -      Dressing Care dressing applied;low-adherent;foam;border dressing  honey, 6\" optifoam  -      Periwound Care cleansed with pH balanced cleanser;dry periwound area maintained  -      Retired Wound - Properties Group Placement Date: 12/01/22  - Placement Time: 1355  - Present on Hospital Admission: N  -MC Side: Right  -MC Orientation: posterior  -MC Location: heel  -MC Primary Wound Type: Fissure  -MC    Retired Wound - Properties Group Date first assessed: 12/01/22  - Time first assessed: 1355  - Present on Hospital Admission: N  -MC Side: Right  -MC Location: heel  -MC Primary Wound Type: Fissure  -MC       Wound 10/08/22 1400 Left anterior foot Arterial Ulcer    Wound - Properties Group Placement Date: 10/08/22  - Placement Time: 1400  -MF Side: Left  -MF Orientation: anterior  -MF Location: foot  -MF Primary Wound Type: Arterial ulc  -MF    Wound Image Images linked: 1  -MC      Dressing Appearance intact;moist drainage  -MC      Base moist;pink;red;granulating;yellow;slough  -MC      Periwound intact;dry;redness;warm  -MC      Periwound Temperature warm  -      Periwound Skin " "Turgor soft  -      Edges open  -      Wound Length (cm) 0.8 cm  -      Wound Width (cm) 1.2 cm  -      Wound Depth (cm) 0.4 cm  -      Wound Surface Area (cm^2) 0.96 cm^2  -      Wound Volume (cm^3) 0.384 cm^3  -      Drainage Characteristics/Odor serosanguineous  -      Drainage Amount small  -      Care, Wound cleansed with;wound cleanser;debrided  -      Dressing Care dressing applied;silver impregnated;collagen;antimicrobial agent applied;foam;low-adherent;border dressing  carolee, saline-moist HFBc, 4\" optifoam  -      Periwound Care cleansed with pH balanced cleanser;dry periwound area maintained  -      Retired Wound - Properties Group Placement Date: 10/08/22  - Placement Time: 1400  -MF Side: Left  - Orientation: anterior  - Location: foot  - Primary Wound Type: Arterial ulc  -MF    Retired Wound - Properties Group Date first assessed: 10/08/22  - Time first assessed: 1400  -MF Side: Left  - Location: foot  - Primary Wound Type: Arterial ulc  -MF          User Key  (r) = Recorded By, (t) = Taken By, (c) = Cosigned By    Initials Name Provider Type    Avtar Small, PT Physical Therapist    Lucila Mohan, PT Physical Therapist                  WOUND DEBRIDEMENT  Total area of Debridement: 6 cm2  Debridement Site 1  Location- Site 1: L foot dorsal wound  Selective Debridement- Site 1: Wound Surface <20cmsq  Instruments- Site 1: tweezers, #15, scapel  Excised Tissue Description- Site 1: minimum, slough, moderate, other (comment) (crust)  Bleeding- Site 1: none   Debridement Site 2  Location- Site 2: L foot fissures  Selective Debridement- Site 2: Wound Surface <20cmsq  Instruments- Site 2: #15, scapel  Excised Tissue Description- Site 2: minimum, slough, other (comment) (clothing debris/fibers)  Bleeding- Site 2: scant, held pressure, 1 minute   Debridement Site 3  Location- Site 3: R foot fissures  Selective Debridement- Site 3: Wound Surface " <20cmsq  Instruments- Site 3: #15, scapel  Excised Tissue Description- Site 3: minimum, slough, other (comment) (clothing debris/fibers)  Bleeding- Site 3: seeping, held pressure, 1 minute      Therapy Education     Row Name 12/02/22 1000             Therapy Education    Education Details Continue POC for L dorsal wound. B heels with no DTPIs, but there are fissures likely caused from dryness and friction, as the pt tends to propel her w/c with her feet. Offload the heels as much as able and maintain honey/optifoams until next treatment. Once areas are closed, pt will benefit from consistent moisturization and avoidance of w/c propulsion with feet.  -MC      Given Symptoms/condition management;Bandaging/dressing change  -MC      Program Reinforced;Modified  -MC      How Provided Verbal;Demonstration  -MC      Provided to Patient;Caregiver  daughter  -      Level of Understanding Verbalized;Teach back education performed  -            User Key  (r) = Recorded By, (t) = Taken By, (c) = Cosigned By    Initials Name Provider Type    Lucila Mohan PT Physical Therapist                Recommendation and Plan   PT Assessment/Plan     Row Name 12/02/22 1000          PT Assessment    Functional Limitations Performance in self-care ADL;Impaired gait;Other (comment)  wound management limitations  -     Impairments Impaired arterial circulation;Integumentary integrity;Pain;Impaired sensory integrity  -     Assessment Comments Pt's dorsal wound is progressing very well with the current POC. The area is smaller, almost fully granulated, and requires very little debridement at this time. Unfortunately, pt with new fissures to both heels that family was initially concerned was indicative of DTPIs. No DTPIs noted at this time, but the patient does have two small fissures to the L heel and one fissure and one scab to the R heel. The periwound skin is blanchable but dry, thin, and fragile. PT suspects these fissures  are due to dryness and friction from the patient using her feet for w/c propulsion. Added therahoney to promote autolytic debridement of the fissures, as debridement was somewhat limited by pain today. Once the areas are closed, pt will benefit from offloading, appropriate moisturization, and avoidance of friction as much as possible to prevent further issues.  -     Rehab Potential Good  -     Patient/caregiver participated in establishment of treatment plan and goals Yes  -     Patient would benefit from skilled therapy intervention Yes  -        PT Plan    PT Frequency 1x/week  -     Physical Therapy Interventions (Optional Details) wound care;patient/family education  -     PT Plan Comments debridement, dressings, offloading education  -           User Key  (r) = Recorded By, (t) = Taken By, (c) = Cosigned By    Initials Name Provider Type    Lucila Mohan, PT Physical Therapist                Goals   PT OP Goals     Row Name 12/01/22 1554          Time Calculation    PT Goal Re-Cert Due Date 01/01/23  -           User Key  (r) = Recorded By, (t) = Taken By, (c) = Cosigned By    Initials Name Provider Type    Lucila Mohan, PT Physical Therapist                PT Goal Re-Cert Due Date: 01/01/23            Time Calculation: Start Time: 1355  Untimed Charges  [id number] debridement: 30  Total Minutes  Untimed Charges Total Minutes: 30   Total Minutes: 30  Therapy Charges for Today     Code Description Service Date Service Provider Modifiers Qty    [id number] HC PIPE DEBRIDE OPEN WOUND UP TO 20CM 12/1/2022 Lucila Ulrich, PT GP 1                  Lucila Ulrich PT  12/2/2022

## 2022-12-07 ENCOUNTER — PATIENT MESSAGE (OUTPATIENT)
Dept: GASTROENTEROLOGY | Facility: CLINIC | Age: 82
End: 2022-12-07

## 2022-12-07 ENCOUNTER — TELEPHONE (OUTPATIENT)
Dept: GASTROENTEROLOGY | Facility: CLINIC | Age: 82
End: 2022-12-07

## 2022-12-08 ENCOUNTER — HOSPITAL ENCOUNTER (OUTPATIENT)
Dept: PHYSICAL THERAPY | Facility: HOSPITAL | Age: 82
Setting detail: THERAPIES SERIES
Discharge: HOME | End: 2022-12-08
Payer: MEDICARE

## 2022-12-08 DIAGNOSIS — S91.302D OPEN WOUND OF LEFT FOOT, SUBSEQUENT ENCOUNTER: Primary | ICD-10-CM

## 2022-12-08 DIAGNOSIS — S91.302D OPEN WOUND OF LEFT HEEL, SUBSEQUENT ENCOUNTER: ICD-10-CM

## 2022-12-08 DIAGNOSIS — S91.301D OPEN WOUND OF RIGHT HEEL, SUBSEQUENT ENCOUNTER: ICD-10-CM

## 2022-12-08 PROCEDURE — 97597 DBRDMT OPN WND 1ST 20 CM/<: CPT

## 2022-12-08 NOTE — THERAPY WOUND CARE TREATMENT
Outpatient Rehabilitation - Wound/Debridement Treatment Note  [facility]     Patient Name: Susan Anderson  : 1940  MRN: [id number]  Today's Date: 2022             L dorsal foot      L heel      R heel      Admit Date: 2022    Visit Dx:    ICD-10-CM ICD-9-CM   1. Open wound of left foot, subsequent encounter  S91.302D V58.89     892.0   2. Open wound of left heel, subsequent encounter  S91.302D V58.89     892.0   3. Open wound of right heel, subsequent encounter  S91.301D V58.89     892.0       Patient Active Problem List   Diagnosis   • Diabetic foot ulcer (Formerly [facility])   • PVD (peripheral vascular disease) (Formerly [facility])   • Osteomyelitis (Formerly [facility])   • Diabetes mellitus with neuropathy (Formerly [facility])   • Essential hypertension   • Stage 3a chronic kidney disease (Formerly [facility])   • Pyogenic inflammation of bone (Formerly [facility])   • Hyperglycemia   • Pneumonia due to infectious organism   • Mitral valve disease   • NICM (nonischemic cardiomyopathy) (Formerly [facility])   • Coronary artery disease involving native coronary artery of native heart without angina pectoris   • Dyslipidemia   • Chronic combined systolic and diastolic heart failure (Formerly [facility])   • Lumbar stenosis with neurogenic claudication   • Spondylosis of lumbar region without myelopathy or radiculopathy   • Spondylolisthesis, lumbar region   • Degeneration of lumbar or lumbosacral intervertebral disc   • Gait disturbance   • Physical deconditioning   • Sarcopenia   • Weakness   • Anemia, chronic disease   • Fall   • Dizziness   • Demand ischemia (Former[facility])   • Effusion of right knee   • Right knee pain   • Pressure injury of skin of sacral region   • Sleep apnea   • GIB (gastrointestinal bleeding)   • Vasovagal syncope   • Hypomagnesemia   • Syncope, unspecified syncope type   • Paroxysmal atrial fibrillation ([facility])   • Ulcer of esophagus without bleeding        Past Medical History:   Diagnosis Date   • Arthritis    • Asthma  - double pneumonia    Currently on inhaler and nebulizer   • Atrial  fibrillation (HCC) 8/21/2022   • Cancer (HCC)     cervical cancer, skin cancer   • CHF (congestive heart failure) (HCC) June 4, 2021   • Chronic kidney disease Related to diabetes   • Coronary artery disease 6/4/2021 DX for hear failure   • Diabetes mellitus (HCC) 30 years    Seeing Dr. Lam 1st time Aug 19   • Gout    • Hx of colonoscopy    • Hyperlipidemia Reference current labs x 2-3yrs approx   • Hypertension 30 years   • Migraine    • Mitral valve disease    • Mitral valve disease    • Osteomyelitis (HCC)    • Peripheral neuropathy    • Sleep apnea    • Type 2 diabetes mellitus (HCC)     30 years        Past Surgical History:   Procedure Laterality Date   • ABDOMINAL HYSTERECTOMY W/SALPINGECTOMY     • AMPUTATION  Right great toe, 1st 1/3 metatarsal -Reg 4/14/21   • AORTAGRAM N/A 4/9/2021    Procedure: AORTAGRAM WITH OR WITHOUT RUNOFFS, WITH Co2;  Surgeon: Trey Forrest MD;  Location:  [facility];  Service: Vascular;  Laterality: N/A;   • AORTAGRAM N/A 9/28/2022    Procedure: CO2 ANGIOGRAM, LEFT SFA ANGIOPLASTY WITH DRUG ELUTING BALLOON, LEFT SFA STENT PLACEMENT ;  Surgeon: Trey Forrest MD;  Location:  [facility];  Service: Vascular;  Laterality: N/A;  FLUORO: 13.12  DOSE 162mGy  CONTRAST: Isovue 350: 15ml   • APPENDECTOMY     • BRAIN TUMOR EXCISION      laser surgery    • CARDIAC CATHETERIZATION N/A 6/21/2021    Procedure: LEFT HEART CATH;  Surgeon: Anjum Ceballos MD;  Location:  [facility];  Service: Cardiology;  Laterality: N/A;   • CATARACT EXTRACTION W/ INTRAOCULAR LENS  IMPLANT, BILATERAL     • CHOLECYSTECTOMY     • ENDOSCOPY N/A 10/7/2022    Procedure: ESOPHAGOGASTRODUODENOSCOPY;  Surgeon: Stfe Veras MD;  Location:  [facility]OSCOPY;  Service: Gastroenterology;  Laterality: N/A;   • EYE SURGERY     • HYSTERECTOMY     • TOE SURGERY     • TRANS METATARSAL AMPUTATION Right 4/14/2021    Procedure: AMPUTATION TRANS METATARSAL RIGHT GREAT TOE;  Surgeon: Valdez Finney  "MD;  Location: [facility];  Service: Vascular;  Laterality: Right;         EVALUATION   PT Ortho     Row Name 12/08/22 1500       Subjective Comments    Subjective Comments Pt still with some heel pain, more in the L heel than the R. Pt with some additional swelling today, was in a dependent position more yesterday than usual.  -MC       Subjective Pain    Able to rate subjective pain? yes  -MC    Pre-Treatment Pain Level 4  -MC    Post-Treatment Pain Level 4  -MC    Subjective Pain Comment L heel  -MC       Transfers    Comment, (Transfers) remained seated for tx  -          User Key  (r) = Recorded By, (t) = Taken By, (c) = Cosigned By    Initials Name Provider Type     Lucila Ulrich PT Physical Therapist                 LDA Wound     Row Name 12/08/22 1500             Wound 12/01/22 1355 Left posterior heel Fissure    Wound - Properties Group Placement Date: 12/01/22  - Placement Time: 1355  - Present on Hospital Admission: N  - Side: Left  -MC Orientation: posterior  -MC Location: heel  -MC Primary Wound Type: Fissure  -MC    Wound Image Images linked: 1  -MC      Dressing Appearance intact;moist drainage  -MC      Base moist;pink;red;yellow;slough  -      Periwound intact;pink;blanchable;moist;pale white;macerated  -      Periwound Temperature warm  -      Periwound Skin Turgor soft  -      Edges irregular;open  -      Drainage Characteristics/Odor serous  -MC      Drainage Amount scant  -      Care, Wound cleansed with;wound cleanser;debrided  -      Dressing Care dressing applied;silver impregnated;low-adherent;foam;border dressing  mepilex Ag, 6\" optifoam  -      Periwound Care cleansed with pH balanced cleanser;barrier ointment applied  zguard  -      Retired Wound - Properties Group Placement Date: 12/01/22  - Placement Time: 1355  - Present on Hospital Admission: N  - Side: Left  -MC Orientation: posterior  -MC Location: heel  -MC Primary Wound Type: Fissure  -MC    " "Retired Wound - Properties Group Date first assessed: 12/01/22  - Time first assessed: 1355  - Present on Hospital Admission: N  -MC Side: Left  -MC Location: heel  -MC Primary Wound Type: Fissure  -MC       Wound 12/01/22 1355 Right posterior heel Fissure    Wound - Properties Group Placement Date: 12/01/22  - Placement Time: 1355  -MC Present on Hospital Admission: N  -MC Side: Right  -MC Orientation: posterior  -MC Location: heel  -MC Primary Wound Type: Fissure  -MC    Wound Image Images linked: 1  -MC      Dressing Appearance intact;moist drainage  -MC      Base moist;pink;yellow;slough  -MC      Periwound intact;dry;blanchable;pink  -MC      Periwound Temperature warm  -MC      Periwound Skin Turgor soft  -MC      Edges irregular;open  -MC      Drainage Characteristics/Odor serous  -MC      Drainage Amount scant  -MC      Care, Wound cleansed with;wound cleanser;debrided  -MC      Dressing Care dressing applied;silver impregnated;low-adherent;foam;border dressing  mepilex Ag, 6\" optifoam  -      Periwound Care cleansed with pH balanced cleanser;barrier ointment applied  zguard  -      Retired Wound - Properties Group Placement Date: 12/01/22  - Placement Time: 1355  -MC Present on Hospital Admission: N  -MC Side: Right  -MC Orientation: posterior  -MC Location: heel  -MC Primary Wound Type: Fissure  -MC    Retired Wound - Properties Group Date first assessed: 12/01/22  - Time first assessed: 1355  -MC Present on Hospital Admission: N  -MC Side: Right  -MC Location: heel  -MC Primary Wound Type: Fissure  -MC       Wound 10/08/22 1400 Left anterior foot Arterial Ulcer    Wound - Properties Group Placement Date: 10/08/22  -MF Placement Time: 1400  -MF Side: Left  -MF Orientation: anterior  -MF Location: foot  -MF Primary Wound Type: Arterial ulc  -MF    Wound Image Images linked: 1  -MC      Dressing Appearance intact;moist drainage  -MC      Base moist;pink;red;granulating;yellow;slough  -MC      " "Periwound intact;dry;redness;warm  -      Periwound Temperature warm  -      Periwound Skin Turgor soft  -      Edges open  -      Wound Length (cm) 0.7 cm  -      Wound Width (cm) 0.6 cm  -      Wound Depth (cm) 0.2 cm  -      Wound Surface Area (cm^2) 0.42 cm^2  -      Wound Volume (cm^3) 0.084 cm^3  -      Drainage Characteristics/Odor serosanguineous  -      Drainage Amount small  -      Care, Wound cleansed with;wound cleanser;debrided  -      Dressing Care dressing applied;silver impregnated;collagen;antimicrobial agent applied;foam;low-adherent;border dressing  carolee, saline-moist HFBc, 4\" optifoam  -      Periwound Care cleansed with pH balanced cleanser;dry periwound area maintained  -      Retired Wound - Properties Group Placement Date: 10/08/22  - Placement Time: 1400  -MF Side: Left  - Orientation: anterior  -MF Location: foot  -MF Primary Wound Type: Arterial ulc  -MF    Retired Wound - Properties Group Date first assessed: 10/08/22  - Time first assessed: 1400  -MF Side: Left  - Location: foot  -MF Primary Wound Type: Arterial ulc  -MF          User Key  (r) = Recorded By, (t) = Taken By, (c) = Cosigned By    Initials Name Provider Type    Avtar Small, PT Physical Therapist    Lucila Mohan, PT Physical Therapist                  WOUND DEBRIDEMENT  Total area of Debridement: 2 cm2  Debridement Site 1  Location- Site 1: L foot dorsal wound  Selective Debridement- Site 1: Wound Surface <20cmsq  Instruments- Site 1: tweezers, #15, scapel  Excised Tissue Description- Site 1: minimum, slough  Bleeding- Site 1: none   Debridement Site 2  Location- Site 2: L foot fissures  Selective Debridement- Site 2: Wound Surface <20cmsq  Instruments- Site 2: #15, scapel  Excised Tissue Description- Site 2: minimum, slough  Bleeding- Site 2: none          Therapy Education     Row Name 12/08/22 1500             Therapy Education    Education Details Continue current " POC for dorsal wound. For heels, dress with zguard to the periwound areas and mepilex Ag, optifoam borders.  -      Given Symptoms/condition management;Bandaging/dressing change  -      Program Reinforced;Modified  -      How Provided Verbal;Demonstration  -      Provided to Patient;Caregiver  daughter  -      Level of Understanding Verbalized;Teach back education performed  -            User Key  (r) = Recorded By, (t) = Taken By, (c) = Cosigned By    Initials Name Provider Type    Lucila Mohan PT Physical Therapist                Recommendation and Plan   PT Assessment/Plan     Row Name 12/08/22 1500          PT Assessment    Functional Limitations Performance in self-care ADL;Impaired gait;Other (comment)  wound management limitations  -     Impairments Impaired arterial circulation;Integumentary integrity;Pain;Impaired sensory integrity  -     Assessment Comments The dorsal foot wound continues to progress well, with reduced wound dimensions since last assessment. The posterior heel fissures on the L are slightly larger but  and more appropriately moist. Switched dressings to zguard to address periwound moisture and mepilex Ag to cover. The R heel fissure is progressing well, and the small scab fell off to reveal a very small ulceration with yellow slough. Pt will continue to benefit from skilled PT wound care to promote healing.  -     Rehab Potential Good  -     Patient/caregiver participated in establishment of treatment plan and goals Yes  -     Patient would benefit from skilled therapy intervention Yes  -MC        PT Plan    PT Frequency 1x/week  -     Physical Therapy Interventions (Optional Details) wound care;patient/family education  -     PT Plan Comments debridement, dressings  -           User Key  (r) = Recorded By, (t) = Taken By, (c) = Cosigned By    Initials Name Provider Type    Lucila Mohan PT Physical Therapist                Goals   PT  OP Goals     Row Name 12/08/22 1558          Time Calculation    PT Goal Re-Cert Due Date 01/01/23  -           User Key  (r) = Recorded By, (t) = Taken By, (c) = Cosigned By    Initials Name Provider Type    Lucila Mohan, PT Physical Therapist                PT Goal Re-Cert Due Date: 01/01/23            Time Calculation: Start Time: 1515  Untimed Charges  [id number] debridement: 20  Total Minutes  Untimed Charges Total Minutes: 20   Total Minutes: 20  Therapy Charges for Today     Code Description Service Date Service Provider Modifiers Qty    [id number] HC PIPE DEBRIDE OPEN WOUND UP TO 20CM 12/8/2022 Lucila Ulrich, PT GP 1                  Lucila Ulrich, PT  12/8/2022

## 2022-12-12 NOTE — PROGRESS NOTES
Electrophysiology Clinic Consult     Susan Anderson  1940  [unfilled]  [unfilled]    12/14/22    DATE OF ADMISSION: (Not on file)  [facility]    Arleen Doherty MD  [address] / [place] [address]  Referring Provider: Anjum Ceballos MD     Chief Complaint   Patient presents with   • Coronary artery disease involving native coronary artery of   • Atrial Fibrillation     Problem list:  1. PAF  a. Chadsvasc: 5, Eliquis  b. Metoprolol 12.5 mg twice daily  2. NICM/ Systolic/Diastolic CHF  a. Echo, 06/04/2021: EF 43%. Global hypokinesis. More pronounced in the anteroseptal wall and apex. Severe mitral annular calcification. Mild to moderate MR. Moderate sized left pleural effusion.  b. C, 06/21/2021: EF 40-45%. Mild to moderate nonobstructive CAD involving multiple vessels without any evidence of severe or critical disease.  c. Echo, 06/09/2022: EF 46-50%. Mild MR.    d. Echo, 07/29/2022: EF 55%. Mild MR. Aortic sclerosis without aortic stenosis or regurgitation.  e. Echo, 08/27/2022: EF 55%. Mild to moderate MR.   3. Nonobstructive coronary artery disease  A. C, 06/21/2021:  EF 40-45%. Mild to moderate nonobstructive CAD involving multiple vessels without any evidence of severe or critical disease.  4. PVD  a. L femoral artery stent   5. HTN  6. CKD, stage III  7. Acoustic Neuroma   8. DM II  9. GI bleed  10. Surgeries  a. Right great toe amputation (osteomyelitis)      History of Present Illness:   Susan Anderson is an 82-year-old female with above past medical history presenting in consultation from Dr. Ceballos for possible watchman device.  Had a fall end of July and was found to have cardiorenal syndrome with 70 pound increase of fluid. She was diagnosed with afib during an episode of fluid overload August 2022. She does not have palpitations but severe SOB with just minimal activity. Intermittent dizziness that she believes is due to acoustic neuroma.  In  May 2021, she started having issues of fluid overload and was seeing Dr. Ceballos and doing outpatient IV diuresis.  Went into afib in the midst of the issues with fluid. She had an admission for hyperkalemia recently due to spirolactone with K+ supplements. Dr. Finney put a stent in L femoral artery in November. Following that she was found to be anemic and underwent upper endoscopy revealed multiple ulcers, biopsied and NOAC had to be held. She received 1 unit blood transfusion. Stopped ASA but has continued Eliquis and Plavix.  Patient is at high risk for falls due to weakness of her legs and nonhealing ulcers.  She is presently on a wheelchair.    Allergies   Allergen Reactions   • Baclofen Other (See Comments) and Hallucinations     PSYCHOSIS-POA REFUSES ADMINISTRATION OF THIS MED.   • Cephalexin Nausea Only   • Erythromycin Base Nausea Only   • Melatonin Other (See Comments)     Nightmares   • Oxycodone Nausea Only   • Sulfa Antibiotics Nausea Only   • Zoloft [Sertraline] Rash        Cannot display prior to admission medications because the patient has not been admitted in this contact.            Current Outpatient Medications:   •  acetaminophen (TYLENOL) 325 MG tablet, Take 2 tablets by mouth Every 6 (Six) Hours As Needed for Mild Pain., Disp: , Rfl:   •  apixaban (ELIQUIS) 2.5 MG tablet tablet, Take 1 tablet by mouth 2 (Two) Times a Day., Disp: 60 tablet, Rfl: 6  •  atorvastatin (LIPITOR) 40 MG tablet, Take 1 tablet by mouth Daily., Disp: 30 tablet, Rfl: 6  •  bisacodyl (DULCOLAX) 10 MG suppository, Insert 1 suppository into the rectum Every 72 (Seventy-Two) Hours As Needed for Constipation., Disp: , Rfl:   •  bumetanide (BUMEX) 0.5 MG tablet, Take 1 tablet by mouth Daily., Disp: 30 tablet, Rfl: 6  •  Cholecalciferol (Vitamin D3 Super Strength) 50 MCG (2000 UT) tablet, Take 2,000 Units by mouth Daily. OTC, Disp: , Rfl:   •  clopidogrel (PLAVIX) 75 MG tablet, Take 1 tablet by mouth Daily., Disp: 30 tablet, Rfl:  6  •  clotrimazole-betamethasone (Lotrisone) 1-0.05 % cream, Apply 1 application topically to the appropriate area as directed 2 (Two) Times a Day., Disp: 45 g, Rfl: 2  •  cyclobenzaprine (FLEXERIL) 5 MG tablet, Take 1 tablet by mouth 3 (Three) Times a Day As Needed for Muscle Spasms., Disp: 30 tablet, Rfl: 0  •  gabapentin (NEURONTIN) 100 MG capsule, Take 1 capsule by mouth 3 (Three) Times a Day., Disp: 90 capsule, Rfl: 2  •  guaiFENesin (MUCINEX) 600 MG 12 hr tablet, Take 2 tablets by mouth 2 (Two) Times a Day., Disp: , Rfl:   •  hydrALAZINE (APRESOLINE) 25 MG tablet, Take 1 tablet by mouth Every 8 (Eight) Hours., Disp: 90 tablet, Rfl: 6  •  hydrOXYzine pamoate (Vistaril) 25 MG capsule, Take 1 capsule by mouth 3 (Three) Times a Day As Needed for Itching or Anxiety (sleep)., Disp: 30 capsule, Rfl: 5  •  insulin glargine (Lantus) 100 UNIT/ML injection, Inject 15 Units under the skin into the appropriate area as directed Every Night., Disp: , Rfl:   •  insulin lispro (HumaLOG) 100 UNIT/ML injection, Inject 12 Units under the skin into the appropriate area as directed 3 (Three) Times a Day Before Meals. (Patient taking differently: Inject 12 Units under the skin into the appropriate area as directed. 3 units tid + Sliding Scale Before Meals: 6 units- 200-250 8 units- 251-300 10 units- 301-350 12 units - 351-400), Disp: 20 mL, Rfl: 5  •  lactobacillus acidophilus (RISAQUAD) capsule capsule, Take 1 capsule by mouth Daily., Disp: 30 capsule, Rfl: 0  •  Lancets misc, 1 each 3 (Three) Times a Day., Disp: 100 each, Rfl: 5  •  metoprolol tartrate (LOPRESSOR) 25 MG tablet, Take 0.5 (one-half) tablet by mouth Every 12 (Twelve) Hours., Disp: 60 tablet, Rfl: 6  •  ondansetron ODT (ZOFRAN-ODT) 4 MG disintegrating tablet, Place 1 tablet on the tongue Every 8 (Eight) Hours As Needed for Nausea or Vomiting., Disp: 14 tablet, Rfl: 0  •  pantoprazole (PROTONIX) 40 MG EC tablet, Take 1 tablet by mouth 2 (Two) Times a Day Before Meals.,  Disp: 60 tablet, Rfl: 11  •  polyethylene glycol (MIRALAX) 17 GM/SCOOP powder, Dissolve 17 g (1 capful) in 8oz of non-carbonated liquid and drink by mouth Daily., Disp: 510 g, Rfl: 0  •  sennosides-docusate (PERICOLACE) 8.6-50 MG per tablet, Take 1 tablet by mouth Every Night. (Patient taking differently: Take 1 tablet by mouth every night at bedtime.), Disp: , Rfl:   •  vitamin B-12 (CYANOCOBALAMIN) 100 MCG tablet, Take 1 tablet by mouth Daily. OTC, Disp: , Rfl:     Social History     Socioeconomic History   • Marital status:    • Number of children: 1   Tobacco Use   • Smoking status: Never   • Smokeless tobacco: Never   Vaping Use   • Vaping Use: Never used   Substance and Sexual Activity   • Alcohol use: Yes     Comment: Social drinking when not recovering from hospitalization   • Drug use: Never   • Sexual activity: Not Currently     Birth control/protection: None       Family History   Problem Relation Age of Onset   • Diabetes Mother         Type II   • Heart disease Father    • Heart attack Father    • Coronary artery disease Father    • Diabetes Father         Type II   • Diabetes Sister    • Diabetes Maternal Grandmother    • Diabetes Maternal Grandfather    • Diabetes Paternal Grandmother    • Diabetes Paternal Grandfather    father: pacemaker      REVIEW OF SYSTEMS:   CONST:  No weight loss, fever, chills, +weakness +fatigue.   HEENT:  No visual loss, blurred vision, double vision, yellow sclerae.                   No hearing loss, congestion, sore throat.   SKIN:      No rashes, urticaria, ulcers, sores.     RESP:     +shortness of breath, hemoptysis, cough, sputum.   GI:           No anorexia, nausea, vomiting, diarrhea. No abdominal pain, melena.   :         No burning on urination, hematuria or increased frequency.  ENDO:    No diaphoresis, cold or heat intolerance. No polyuria or polydipsia.   NEURO:  No headache, +dizziness, -syncope, paralysis, ataxia, or parasthesias.                  No  "change in bowel or bladder control. No history of CVA/TIA  MUSC:    No muscle, back pain, joint pain or stiffness.   HEME:    No anemia, bleeding, bruising. No history of DVT/PE.  PSYCH:  No history of depression, anxiety    Vitals:    12/14/22 1303   BP: 136/72   BP Location: Left arm   Patient Position: Sitting   Pulse: 74   SpO2: 96%   Weight: 78.5 kg (173 lb)   Height: 162.6 cm (64\")                 Physical Exam:  GEN: Well nourished, well-developed, no acute distress  HEENT: Normocephalic, atraumatic, PERRLA, moist mucous membranes  NECK: Supple, NO JVD, no thyromegaly, no lymphadenopathy  CARD: S1S2, RRR, no murmur, gallop, rub  LUNGS: Clear to auscultation, normal respiratory effort  ABDOMEN: Soft, nontender, normal bowel sounds  EXTREMITIES: No gross deformities, no clubbing, cyanosis, or edema  SKIN: Warm, dry  NEURO: No focal deficits, alert and oriented x 3  PSYCHIATRIC: Normal affect and mood      I personally viewed and interpreted the patient's EKG/Telemetry/lab data    Lab Results   Component Value Date    GLUCOSE 81 10/25/2022    CALCIUM 8.7 10/25/2022     10/25/2022    K 4.4 10/25/2022    CO2 28.1 10/25/2022     10/25/2022    BUN 55 (H) 10/25/2022    CREATININE 1.32 (H) 10/25/2022    EGFRIFNONA 41 (L) 02/25/2022    BCR 41.7 (H) 10/25/2022    ANIONGAP 11.9 10/25/2022     Lab Results   Component Value Date    WBC 5.22 10/25/2022    HGB 9.4 (L) 10/25/2022    HCT 28.7 (L) 10/25/2022    MCV 93.5 10/25/2022     10/25/2022     Lab Results   Component Value Date    INR 1.15 06/08/2021    PROTIME 14.4 06/08/2021     Lab Results   Component Value Date    TSH 3.730 10/25/2022             ECG 12 Lead    Date/Time: 12/14/2022 1:41 PM  Performed by: Eloy Marlow MD  Authorized by: Eloy Marlow MD   Comparison: compared with previous ECG from 10/6/2022  Similar to previous ECG  Rhythm: sinus rhythm  Rate: normal  BPM: 74                ICD-10-CM ICD-9-CM   1. Paroxysmal atrial " fibrillation (HCC)  I48.0 427.31   2. NICM (nonischemic cardiomyopathy) ([facility])  I42.8 425.4   3. Coronary artery disease involving native coronary artery of native heart without angina pectoris  I25.10 414.01       Assessment and Plan:   1. PAF asymptomatic in nature  -Chadsvasc: 5  Eliquis  -Metoprolol 12.5 mg twice daily  -incidentally found during admission for fluid overload.    2. CHADVASC 5; options include discontinuing all oral anticoagulation and monitor versus continuing oral anticoagulation with risk of recurrent bleeding and falls versus insertion of left atrial appendage occlusive device (watchman device).  She has been deemed a poor long-term oral anticoagulant candidate but a reasonable short term anticoagulant candidate.  After long discussion with the patient and her daughter today, she would like to pursue the insertion of the device.  She does understand all the risk and benefits in detail of the procedure and wishes to pursue.    3.  NICM/ Systolic/Diastolic HF  -Echo, 08/27/2022: EF 55%. Mild to moderate MR.  Per Dr. Ceballos    4. Nonobstructive CAD   -[facility], 06/21/2021:   Mild to moderate nonobstructive CAD involving multiple vessels without any evidence of severe or critical disease.  Per Dr. Ceballos     5. Anemia  -EGD 10/2022: nonbleeding ulcers, biopsied, started on protonix, managed by GI    Electronically signed by INDIGO Starr, 12/14/22, 1:19 PM MADHU.    Eloy MCKEE MD, personally performed the services described in this documentation as scribed by the above named individual in my presence, and it is both accurate and complete.  12/14/2022  14:06 EST

## 2022-12-14 ENCOUNTER — HOSPITAL ENCOUNTER (OUTPATIENT)
Dept: PHYSICAL THERAPY | Facility: HOSPITAL | Age: 82
Setting detail: THERAPIES SERIES
Discharge: HOME | End: 2022-12-14
Payer: MEDICARE

## 2022-12-14 ENCOUNTER — OFFICE VISIT (OUTPATIENT)
Dept: CARDIOLOGY | Facility: CLINIC | Age: 82
End: 2022-12-14

## 2022-12-14 VITALS
BODY MASS INDEX: 29.53 KG/M2 | HEART RATE: 74 BPM | HEIGHT: 64 IN | SYSTOLIC BLOOD PRESSURE: 136 MMHG | WEIGHT: 173 LBS | OXYGEN SATURATION: 96 % | DIASTOLIC BLOOD PRESSURE: 72 MMHG

## 2022-12-14 DIAGNOSIS — I25.10 CORONARY ARTERY DISEASE INVOLVING NATIVE CORONARY ARTERY OF NATIVE HEART WITHOUT ANGINA PECTORIS: ICD-10-CM

## 2022-12-14 DIAGNOSIS — I48.0 PAROXYSMAL ATRIAL FIBRILLATION: Primary | ICD-10-CM

## 2022-12-14 DIAGNOSIS — S91.301D OPEN WOUND OF RIGHT FOOT WITH COMPLICATION, SUBSEQUENT ENCOUNTER: ICD-10-CM

## 2022-12-14 DIAGNOSIS — S91.302D OPEN WOUND OF LEFT FOOT, SUBSEQUENT ENCOUNTER: Primary | ICD-10-CM

## 2022-12-14 DIAGNOSIS — S91.301D OPEN WOUND OF RIGHT HEEL, SUBSEQUENT ENCOUNTER: ICD-10-CM

## 2022-12-14 DIAGNOSIS — I42.8 NICM (NONISCHEMIC CARDIOMYOPATHY): ICD-10-CM

## 2022-12-14 DIAGNOSIS — S91.302D OPEN WOUND OF LEFT HEEL, SUBSEQUENT ENCOUNTER: ICD-10-CM

## 2022-12-14 PROCEDURE — 93000 ELECTROCARDIOGRAM COMPLETE: CPT | Performed by: INTERNAL MEDICINE

## 2022-12-14 PROCEDURE — 97597 DBRDMT OPN WND 1ST 20 CM/<: CPT

## 2022-12-14 PROCEDURE — 99214 OFFICE O/P EST MOD 30 MIN: CPT | Performed by: INTERNAL MEDICINE

## 2022-12-14 NOTE — THERAPY WOUND CARE TREATMENT
Outpatient Rehabilitation - Wound/Debridement Treatment Note  [facility]     Patient Name: Susan Anderson  : 1940  MRN: [id number]  Today's Date: 2022                 Admit Date: 2022    Visit Dx:    ICD-10-CM ICD-9-CM   1. Open wound of left foot, subsequent encounter  S91.302D V58.89     892.0   2. Open wound of left heel, subsequent encounter  S91.302D V58.89     892.0   3. Open wound of right heel, subsequent encounter  S91.301D V58.89     892.0   4. Open wound of right foot with complication, subsequent encounter  S91.301D V58.89     892.1       Patient Active Problem List   Diagnosis   • Diabetic foot ulcer ([facility])   • PVD (peripheral vascular disease) ([facility])   • Osteomyelitis ([facility])   • Diabetes mellitus with neuropathy ([facility])   • Essential hypertension   • Stage 3a chronic kidney disease ([facility])   • Pyogenic inflammation of bone ([facility])   • Hyperglycemia   • Pneumonia due to infectious organism   • Mitral valve disease   • NICM (nonischemic cardiomyopathy) ([facility])   • Coronary artery disease involving native coronary artery of native heart without angina pectoris   • Dyslipidemia   • Chronic combined systolic and diastolic heart failure ([facility])   • Lumbar stenosis with neurogenic claudication   • Spondylosis of lumbar region without myelopathy or radiculopathy   • Spondylolisthesis, lumbar region   • Degeneration of lumbar or lumbosacral intervertebral disc   • Gait disturbance   • Physical deconditioning   • Sarcopenia   • Weakness   • Anemia, chronic disease   • Fall   • Dizziness   • Demand ischemia ([facility])   • Effusion of right knee   • Right knee pain   • Pressure injury of skin of sacral region   • Sleep apnea   • GIB (gastrointestinal bleeding)   • Vasovagal syncope   • Hypomagnesemia   • Syncope, unspecified syncope type   • Paroxysmal atrial fibrillation ([facility])   • Ulcer of esophagus without bleeding        Past Medical History:   Diagnosis Date   • Arthritis    • Asthma  - double  pneumonia    Currently on inhaler and nebulizer   • Atrial fibrillation (HCC) 8/21/2022   • Cancer (HCC)     cervical cancer, skin cancer   • CHF (congestive heart failure) (HCC) June 4, 2021   • Chronic kidney disease Related to diabetes   • Coronary artery disease 6/4/2021 DX for hear failure   • Diabetes mellitus (HCC) 30 years    Seeing Dr. Lam 1st time Aug 19   • Gout    • Hx of colonoscopy    • Hyperlipidemia Reference current labs x 2-3yrs approx   • Hypertension 30 years   • Migraine    • Mitral valve disease    • Mitral valve disease    • Osteomyelitis (HCC)    • Peripheral neuropathy    • Sleep apnea    • Type 2 diabetes mellitus (HCC)     30 years        Past Surgical History:   Procedure Laterality Date   • ABDOMINAL HYSTERECTOMY W/SALPINGECTOMY     • AMPUTATION  Right great toe, 1st 1/3 metatarsal -Reg 4/14/21   • AORTAGRAM N/A 4/9/2021    Procedure: AORTAGRAM WITH OR WITHOUT RUNOFFS, WITH Co2;  Surgeon: Trey Forrest MD;  Location:  [facility];  Service: Vascular;  Laterality: N/A;   • AORTAGRAM N/A 9/28/2022    Procedure: CO2 ANGIOGRAM, LEFT SFA ANGIOPLASTY WITH DRUG ELUTING BALLOON, LEFT SFA STENT PLACEMENT ;  Surgeon: Trey Forrest MD;  Location:  [facility];  Service: Vascular;  Laterality: N/A;  FLUORO: 13.12  DOSE 162mGy  CONTRAST: Isovue 350: 15ml   • APPENDECTOMY     • BRAIN TUMOR EXCISION      laser surgery    • CARDIAC CATHETERIZATION N/A 6/21/2021    Procedure: LEFT HEART CATH;  Surgeon: Anjum Ceballos MD;  Location:  [facility];  Service: Cardiology;  Laterality: N/A;   • CATARACT EXTRACTION W/ INTRAOCULAR LENS  IMPLANT, BILATERAL     • CHOLECYSTECTOMY     • ENDOSCOPY N/A 10/7/2022    Procedure: ESOPHAGOGASTRODUODENOSCOPY;  Surgeon: Stef Veras MD;  Location:  [facility] ENDOSCOPY;  Service: Gastroenterology;  Laterality: N/A;   • EYE SURGERY     • HYSTERECTOMY     • TOE SURGERY     • TRANS METATARSAL AMPUTATION Right 4/14/2021    Procedure: AMPUTATION  "TRANS METATARSAL RIGHT GREAT TOE;  Surgeon: Valdez Finney MD;  Location: [facility];  Service: Vascular;  Laterality: Right;         EVALUATION   PT Ortho     Row Name 12/14/22 1500       Subjective Comments    Subjective Comments Pt/daughter repors pt with significant increase in swelling and endorses lack of BLE elevation recently. Reports they have also tried increasing her Bumex dosage. Pt continues with reports of painful bilateral heels.  -LH       Subjective Pain    Able to rate subjective pain? yes  -LH    Pre-Treatment Pain Level 4  -LH    Post-Treatment Pain Level 4  -LH    Subjective Pain Comment Bilateral heels  -LH       Transfers    Comment, (Transfers) remained seated for tx  -LH          User Key  (r) = Recorded By, (t) = Taken By, (c) = Cosigned By    Initials Name Provider Type     López Graham, PT Physical Therapist                 [facility] Wound     Row Name 12/14/22 1500             Wound 12/01/22 1355 Left posterior heel Fissure    Wound - Properties Group Placement Date: 12/01/22  - Placement Time: 1355  - Present on Hospital Admission: N  - Side: Left  - Orientation: posterior  - Location: heel  - Primary Wound Type: Fissure  -MC    Dressing Appearance intact;moist drainage  -      Base moist;pink;red;yellow;slough  -      Periwound intact;pink;blanchable;moist;pale white;macerated  -      Periwound Temperature warm  -      Periwound Skin Turgor soft  -      Edges irregular;open  -LH      Wound Length (cm) 1.2 cm  -LH      Wound Width (cm) 0.2 cm  -LH      Wound Depth (cm) 0.1 cm  -LH      Wound Surface Area (cm^2) 0.24 cm^2  -LH      Wound Volume (cm^3) 0.024 cm^3  -LH      Drainage Characteristics/Odor serous  -LH      Drainage Amount scant  -LH      Care, Wound cleansed with;wound cleanser;debrided  -LH      Dressing Care dressing applied;silver impregnated;low-adherent;foam;border dressing  mepilex Ag, 6\" optifoam  -LH      Periwound Care cleansed with pH balanced " "cleanser;barrier ointment applied  -      Retired Wound - Properties Group Placement Date: 12/01/22  [facility] 1355  - Present on Hospital Admission: N  - Side: Left  - Orientation: posterior  -MC Location: heel  -MC Primary Wound Type: Fissure  -MC    Retired Wound - Properties Group Date first assessed: 12/01/22  - Time first assessed: 1355  - Present on Hospital Admission: N  - Side: Left  - Location: heel  -MC Primary Wound Type: Fissure  -MC       Wound 12/01/22 1355 Right posterior heel Fissure    Wound - Properties Group Placement Date: 12/01/22  [facility]: 1355  - Present on Hospital Admission: N  - Side: Right  - Orientation: posterior  -MC Location: heel  -MC Primary Wound Type: Fissure  -MC    Dressing Appearance intact;moist drainage  -      Base moist;pink;yellow;slough  -      Periwound intact;dry;blanchable;pink  -      Periwound Temperature warm  -      Periwound Skin Turgor soft  -      Edges irregular;open  -      Wound Length (cm) 0.2 cm  -      Wound Width (cm) 0.2 cm  -      Wound Depth (cm) 0.1 cm  -      Wound Surface Area (cm^2) 0.04 cm^2  -      Wound Volume (cm^3) 0.004 cm^3  -      Drainage Characteristics/Odor serous  -LH      Drainage Amount scant  -LH      Care, Wound cleansed with;wound cleanser;debrided  -      Dressing Care dressing applied;silver impregnated;low-adherent;foam;border dressing  mepilex Ag, 6\" optifoam  -      Periwound Care cleansed with pH balanced cleanser;barrier ointment applied  zguard  -      Retired Wound - Properties Group Placement Date: 12/01/22  [facility]: 1355  - Present on Hospital Admission: N  -MC Side: Right  - Orientation: posterior  -MC Location: heel  -MC Primary Wound Type: Fissure  -MC    Retired Wound - Properties Group Date first assessed: 12/01/22  - Time first assessed: 1355  - Present on Hospital Admission: N  - Side: Right  - Location: heel  -MC Primary " "Wound Type: Fissure  -       Wound 10/08/22 1400 Left anterior foot Arterial Ulcer    Wound - Properties Group Placement Date: 10/08/22  - Placement Time: 1400  - Side: Left  - Orientation: anterior  - Location: foot  -MF Primary Wound Type: Arterial ulc  -    Dressing Appearance intact;moist drainage  -      Base moist;pink;red;granulating;yellow;slough  medial area obscured by adherent crust, increasing skin bridge, with small lateral open area  -      Periwound intact;dry;redness;warm  -      Periwound Temperature warm  -      Periwound Skin Turgor soft  -      Edges open  -      Wound Length (cm) 0.3 cm  -      Wound Width (cm) 0.3 cm  -      Wound Depth (cm) 0.2 cm  -      Wound Surface Area (cm^2) 0.09 cm^2  -      Wound Volume (cm^3) 0.018 cm^3  -      Drainage Characteristics/Odor serosanguineous  -      Drainage Amount small  -      Care, Wound cleansed with;wound cleanser;debrided  -      Dressing Care dressing applied;silver impregnated;collagen;antimicrobial agent applied;foam;low-adherent;border dressing  carolee, saline-moist HFBc, 4\" optifoam  -      Periwound Care cleansed with pH balanced cleanser;dry periwound area maintained  -      Retired Wound - Properties Group Placement Date: 10/08/22  - Placement Time: 1400  -MF Side: Left  - Orientation: anterior  - Location: foot  - Primary Wound Type: Arterial ulc  -MF    Retired Wound - Properties Group Date first assessed: 10/08/22  - Time first assessed: 1400  - Side: Left  - Location: foot  - Primary Wound Type: Arterial ulc  -MF          User Key  (r) = Recorded By, (t) = Taken By, (c) = Cosigned By    Initials Name Provider Type    Avtar Small, PT Physical Therapist    Lucila Mohan, PT Physical Therapist    López Fuller, PT Physical Therapist                  WOUND DEBRIDEMENT  Total area of Debridement: 2cm2  Debridement Site 1  Location- Site 1: L foot dorsal " wound  Selective Debridement- Site 1: Wound Surface <20cmsq  Instruments- Site 1: tweezers, #15  Excised Tissue Description- Site 1: minimum, slough  Bleeding- Site 1: none   Debridement Site 2  Location- Site 2: L foot fissures  Selective Debridement- Site 2: Wound Surface <20cmsq  Instruments- Site 2: #15, scapel, tweezers  Excised Tissue Description- Site 2: minimum, slough  Bleeding- Site 2: none          Therapy Education     Row Name 12/14/22 1500             Therapy Education    Education Details Continue current POC, only use small amount of zguard to directly border all wounds.  -      Given Symptoms/condition management;Bandaging/dressing change  -      Program Reinforced;Modified  -      How Provided Verbal;Demonstration  -      Provided to Patient;Caregiver  daughter  -      Level of Understanding Verbalized;Teach back education performed  -            User Key  (r) = Recorded By, (t) = Taken By, (c) = Cosigned By    Initials Name Provider Type     López Graham, PT Physical Therapist                Recommendation and Plan   PT Assessment/Plan     Row Name 12/14/22 1500          PT Assessment    Functional Limitations Performance in self-care ADL;Impaired gait;Other (comment)  wound management limitations  -     Impairments Impaired arterial circulation;Integumentary integrity;Pain;Impaired sensory integrity  -     Assessment Comments Pt's L dorsal foot wound demonstrating excellent improvements in epithelialization of wound edges with large skin bridge formation and pinpoint lateral open area remaining and obscured medial area d/t crust. Pt's L posterior heel with good improvements in wound dimensions and granulation of wound base. Pt's R posterior heel also with improvements in size with small area of slough firmly adhered to central wound base which PT was unable to fully debride d/t complaints of pain. Pt would continue to benefit from skilled wound care to promote wound healing.   -     Rehab Potential Good  -     Patient/caregiver participated in establishment of treatment plan and goals Yes  -     Patient would benefit from skilled therapy intervention Yes  -        PT Plan    PT Frequency 1x/week  -     Physical Therapy Interventions (Optional Details) wound care;patient/family education  -     PT Plan Comments debridement, dressings  -           User Key  (r) = Recorded By, (t) = Taken By, (c) = Cosigned By    Initials Name Provider Type     López Graham, PT Physical Therapist                Goals   PT OP Goals     Row Name 12/14/22 1538          Time Calculation    PT Goal Re-Cert Due Date 01/01/23  -           User Key  (r) = Recorded By, (t) = Taken By, (c) = Cosigned By    Initials Name Provider Type     López Graham, PT Physical Therapist                PT Goal Re-Cert Due Date: 01/01/23            Time Calculation: Start Time: 1515  Untimed Charges  [id number] debridement: 25  Total Minutes  Untimed Charges Total Minutes: 25   Total Minutes: 25  Therapy Charges for Today     Code Description Service Date Service Provider Modifiers Qty    [id number]  PIPE DEBRIDE OPEN WOUND UP TO 20CM 12/14/2022 López Graham, PT GP 1                  López Graham, PT  12/14/2022

## 2022-12-19 DIAGNOSIS — E11.65 TYPE 2 DIABETES MELLITUS WITH HYPERGLYCEMIA, WITH LONG-TERM CURRENT USE OF INSULIN: ICD-10-CM

## 2022-12-19 DIAGNOSIS — Z79.4 TYPE 2 DIABETES MELLITUS WITH HYPERGLYCEMIA, WITH LONG-TERM CURRENT USE OF INSULIN: ICD-10-CM

## 2022-12-20 RX ORDER — INSULIN GLARGINE 100 [IU]/ML
INJECTION, SOLUTION SUBCUTANEOUS
Qty: 10 ML | Refills: 5 | OUTPATIENT
Start: 2022-12-20

## 2022-12-21 ENCOUNTER — HOSPITAL ENCOUNTER (OUTPATIENT)
Dept: PHYSICAL THERAPY | Facility: HOSPITAL | Age: 82
Setting detail: THERAPIES SERIES
Discharge: HOME | End: 2022-12-21
Payer: MEDICARE

## 2022-12-21 DIAGNOSIS — D63.8 ANEMIA, CHRONIC DISEASE: Primary | ICD-10-CM

## 2022-12-21 DIAGNOSIS — S91.302D OPEN WOUND OF LEFT HEEL, SUBSEQUENT ENCOUNTER: ICD-10-CM

## 2022-12-21 DIAGNOSIS — I48.0 PAROXYSMAL ATRIAL FIBRILLATION: ICD-10-CM

## 2022-12-21 DIAGNOSIS — S91.301D OPEN WOUND OF RIGHT HEEL, SUBSEQUENT ENCOUNTER: ICD-10-CM

## 2022-12-21 DIAGNOSIS — S91.302D OPEN WOUND OF LEFT FOOT, SUBSEQUENT ENCOUNTER: Primary | ICD-10-CM

## 2022-12-21 DIAGNOSIS — K92.2 GASTROINTESTINAL HEMORRHAGE, UNSPECIFIED GASTROINTESTINAL HEMORRHAGE TYPE: ICD-10-CM

## 2022-12-21 PROCEDURE — 97597 DBRDMT OPN WND 1ST 20 CM/<: CPT

## 2022-12-21 NOTE — THERAPY PROGRESS REPORT/RE-CERT
Outpatient Rehabilitation - Wound/Debridement Progress Note  [facility]     Patient Name: Susan Anderson  : 1940  MRN: [id number]  Today's Date: 2022             L dorsal foot      L heel      R heel      Admit Date: 2022    Visit Dx:    ICD-10-CM ICD-9-CM   1. Open wound of left foot, subsequent encounter  S91.302D V58.89     892.0   2. Open wound of left heel, subsequent encounter  S91.302D V58.89     892.0   3. Open wound of right heel, subsequent encounter  S91.301D V58.89     892.0       Patient Active Problem List   Diagnosis   • Diabetic foot ulcer ([facility])   • PVD (peripheral vascular disease) ([facility])   • Osteomyelitis ([facility])   • Diabetes mellitus with neuropathy ([facility])   • Essential hypertension   • Stage 3a chronic kidney disease ([facility])   • Pyogenic inflammation of bone ([facility])   • Hyperglycemia   • Pneumonia due to infectious organism   • Mitral valve disease   • NICM (nonischemic cardiomyopathy) ([facility])   • Coronary artery disease involving native coronary artery of native heart without angina pectoris   • Dyslipidemia   • Chronic combined systolic and diastolic heart failure ([facility])   • Lumbar stenosis with neurogenic claudication   • Spondylosis of lumbar region without myelopathy or radiculopathy   • Spondylolisthesis, lumbar region   • Degeneration of lumbar or lumbosacral intervertebral disc   • Gait disturbance   • Physical deconditioning   • Sarcopenia   • Weakness   • Anemia, chronic disease   • Fall   • Dizziness   • Demand ischemia ([facility])   • Effusion of right knee   • Right knee pain   • Pressure injury of skin of sacral region   • Sleep apnea   • GIB (gastrointestinal bleeding)   • Vasovagal syncope   • Hypomagnesemia   • Syncope, unspecified syncope type   • Paroxysmal atrial fibrillation ([facility])   • Ulcer of esophagus without bleeding        Past Medical History:   Diagnosis Date   • Arthritis    • Asthma  - double pneumonia    Currently on inhaler and nebulizer   • Atrial  fibrillation (HCC) 8/21/2022   • Cancer (HCC)     cervical cancer, skin cancer   • CHF (congestive heart failure) (HCC) June 4, 2021   • Chronic kidney disease Related to diabetes   • Coronary artery disease 6/4/2021 DX for hear failure   • Diabetes mellitus (HCC) 30 years    Seeing Dr. Lam 1st time Aug 19   • Gout    • Hx of colonoscopy    • Hyperlipidemia Reference current labs x 2-3yrs approx   • Hypertension 30 years   • Migraine    • Mitral valve disease    • Mitral valve disease    • Osteomyelitis (HCC)    • Peripheral neuropathy    • Sleep apnea    • Type 2 diabetes mellitus (HCC)     30 years        Past Surgical History:   Procedure Laterality Date   • ABDOMINAL HYSTERECTOMY W/SALPINGECTOMY     • AMPUTATION  Right great toe, 1st 1/3 metatarsal -Reg 4/14/21   • AORTAGRAM N/A 4/9/2021    Procedure: AORTAGRAM WITH OR WITHOUT RUNOFFS, WITH Co2;  Surgeon: Trey Forrest MD;  Location:  [facility], [place];  Service: Vascular;  Laterality: N/A;   • AORTAGRAM N/A 9/28/2022    Procedure: CO2 ANGIOGRAM, LEFT SFA ANGIOPLASTY WITH DRUG ELUTING BALLOON, LEFT SFA STENT PLACEMENT ;  Surgeon: Trey Forrest MD;  Location:  [facility], [place];  Service: Vascular;  Laterality: N/A;  FLUORO: 13.12  DOSE 162mGy  CONTRAST: Isovue 350: 15ml   • APPENDECTOMY     • BRAIN TUMOR EXCISION      laser surgery    • CARDIAC CATHETERIZATION N/A 6/21/2021    Procedure: LEFT HEART CATH;  Surgeon: Anjum Ceballos MD;  Location:  [facility];  Service: Cardiology;  Laterality: N/A;   • CATARACT EXTRACTION W/ INTRAOCULAR LENS  IMPLANT, BILATERAL     • CHOLECYSTECTOMY     • ENDOSCOPY N/A 10/7/2022    Procedure: ESOPHAGOGASTRODUODENOSCOPY;  Surgeon: Stef Veras MD;  Location:  [facility];  Service: Gastroenterology;  Laterality: N/A;   • EYE SURGERY     • HYSTERECTOMY     • TOE SURGERY     • TRANS METATARSAL AMPUTATION Right 4/14/2021    Procedure: AMPUTATION TRANS METATARSAL RIGHT GREAT TOE;  Surgeon: Valdez Finney  "MD;  Location: [facility];  Service: Vascular;  Laterality: Right;         EVALUATION   PT Ortho     Row Name 12/21/22 1100       Subjective Comments    Subjective Comments Dtr and the patient's providers are still working on getting to an appropriate diuretic dosage, as the patient's edema seems unchanged. No other complaints or changes  -       Subjective Pain    Able to rate subjective pain? yes  -MC    Pre-Treatment Pain Level 4  -MC    Post-Treatment Pain Level 4  -MC    Subjective Pain Comment FACES, B heels  -       Transfers    Comment, (Transfers) remained seated for tx  -          User Key  (r) = Recorded By, (t) = Taken By, (c) = Cosigned By    Initials Name Provider Type     Lucila Ulrich, PT Physical Therapist                 LDA Wound     Row Name 12/21/22 1500             Wound 12/01/22 1355 Left posterior heel Fissure    Wound - Properties Group Placement Date: 12/01/22  - Placement Time: 1355  - Present on Hospital Admission: N  -MC Side: Left  -MC Orientation: posterior  -MC Location: heel  -MC Primary Wound Type: Fissure  -MC    Wound Image Images linked: 1  -MC      Dressing Appearance intact;moist drainage  -MC      Base moist;pink;red  -MC      Periwound intact;pink;blanchable;moist;pale white;macerated  -      Periwound Temperature warm  -      Periwound Skin Turgor soft  -MC      Edges irregular;open  -MC      Drainage Characteristics/Odor serous  -MC      Drainage Amount scant  -MC      Care, Wound cleansed with;wound cleanser;debrided  -      Dressing Care dressing applied;silver impregnated;low-adherent;foam;border dressing  mepilex Ag, 6\" optifoam, PF tape  -      Periwound Care cleansed with pH balanced cleanser;barrier ointment applied  zguard  -      Retired Wound - Properties Group Placement Date: 12/01/22  -MC Placement Time: 1355  - Present on Hospital Admission: N  -MC Side: Left  -MC Orientation: posterior  -MC Location: heel  -MC Primary Wound Type: " "Fissure  -MC    Retired Wound - Properties Group Date first assessed: 12/01/22  - Time first assessed: 1355  - Present on Hospital Admission: N  -MC Side: Left  -MC Location: heel  -MC Primary Wound Type: Fissure  -MC       Wound 12/01/22 1355 Right posterior heel Fissure    Wound - Properties Group Placement Date: 12/01/22  - Placement Time: 1355  -MC Present on Hospital Admission: N  -MC Side: Right  -MC Orientation: posterior  -MC Location: heel  -MC Primary Wound Type: Fissure  -MC    Wound Image Images linked: 1  -MC      Dressing Appearance intact;moist drainage  -MC      Base moist;pink  fissure closed, nearby area moist, pink  -MC      Periwound intact;dry;blanchable;pink  -MC      Periwound Temperature warm  -MC      Periwound Skin Turgor soft  -MC      Edges irregular;open  -MC      Wound Length (cm) 0.2 cm  -MC      Wound Width (cm) 0.2 cm  -MC      Wound Depth (cm) 0.1 cm  -MC      Wound Surface Area (cm^2) 0.04 cm^2  -MC      Wound Volume (cm^3) 0.004 cm^3  -MC      Drainage Characteristics/Odor serous  -MC      Drainage Amount scant  -MC      Care, Wound cleansed with;wound cleanser;debrided  -MC      Dressing Care dressing applied;silver impregnated;low-adherent;foam;collagen;border dressing  carolee, mepilex Ag, 6\" optifoam, PF tape  -      Periwound Care cleansed with pH balanced cleanser;barrier ointment applied  zguard  -      Retired Wound - Properties Group Placement Date: 12/01/22  - Placement Time: 1355  -MC Present on Hospital Admission: N  -MC Side: Right  -MC Orientation: posterior  -MC Location: heel  -MC Primary Wound Type: Fissure  -MC    Retired Wound - Properties Group Date first assessed: 12/01/22  - Time first assessed: 1355  - Present on Hospital Admission: N  -MC Side: Right  -MC Location: heel  -MC Primary Wound Type: Fissure  -MC       Wound 10/08/22 1400 Left anterior foot Arterial Ulcer    Wound - Properties Group Placement Date: 10/08/22  - Placement Time: " "1400  -MF Side: Left  - Orientation: anterior  -MF Location: foot  -MF Primary Wound Type: Arterial ulc  -    Wound Image Images linked: 1  -      Dressing Appearance intact;moist drainage  -      Base moist;pink;red;granulating;yellow;slough  medial area moist, pink, and shallow. Lateral area moist, clean, and pink  -      Periwound intact;dry;redness;warm  -      Periwound Temperature warm  -      Periwound Skin Turgor soft  -      Edges open  -      Wound Length (cm) 0.3 cm  -      Wound Width (cm) 0.3 cm  -      Wound Depth (cm) 0.2 cm  -      Wound Surface Area (cm^2) 0.09 cm^2  -      Wound Volume (cm^3) 0.018 cm^3  -      Drainage Characteristics/Odor serosanguineous  -      Drainage Amount small  -      Care, Wound cleansed with;wound cleanser;debrided  -      Dressing Care dressing applied;silver impregnated;collagen;antimicrobial agent applied;foam;low-adherent;border dressing  carolee, saline-moist HFBc, 4\" optifoam  -      Periwound Care cleansed with pH balanced cleanser;dry periwound area maintained  -      Retired Wound - Properties Group Placement Date: 10/08/22  - Placement Time: 1400  - Side: Left  - Orientation: anterior  - Location: foot  -MF Primary Wound Type: Arterial ulc  -MF    Retired Wound - Properties Group Date first assessed: 10/08/22  - Time first assessed: 1400  -MF Side: Left  - Location: foot  - Primary Wound Type: Arterial ulc  -          User Key  (r) = Recorded By, (t) = Taken By, (c) = Cosigned By    Initials Name Provider Type    Avtar Small, PT Physical Therapist    Lucila Mohan, PT Physical Therapist                  WOUND DEBRIDEMENT  Total area of Debridement: 2 cm2  Debridement Site 1  Location- Site 1: L foot dorsal wound  Selective Debridement- Site 1: Wound Surface <20cmsq  Instruments- Site 1: #15, scapel  Excised Tissue Description- Site 1: minimum, slough, other (comment) (crust)  Bleeding- Site " 1: none   Debridement Site 2  Location- Site 2: B foot fissures  Selective Debridement- Site 2: Wound Surface <20cmsq  Instruments- Site 2: #15, scapel, tweezers  Excised Tissue Description- Site 2: minimum, slough, other (comment) (dry skin)  Bleeding- Site 2: none          Therapy Education     Row Name 12/21/22 1500             Therapy Education    Education Details Continue current POC, covering medial aspect of dorsal wound with HFBc as well to prevent crusting if possible. If no improvement in BLE by next tx, may consider very light compression.  -MC      Given Symptoms/condition management;Bandaging/dressing change  -MC      Program Reinforced;Modified  -MC      How Provided Verbal;Demonstration  -MC      Provided to Patient;Caregiver  daughter  -MC      Level of Understanding Verbalized;Teach back education performed  -            User Key  (r) = Recorded By, (t) = Taken By, (c) = Cosigned By    Initials Name Provider Type    Lucila Mohan, PT Physical Therapist                Recommendation and Plan   PT Assessment/Plan     Row Name 12/21/22 1500          PT Assessment    Functional Limitations Performance in self-care ADL;Impaired gait;Other (comment)  wound management limitations  -     Impairments Impaired arterial circulation;Integumentary integrity;Pain;Impaired sensory integrity  -     Assessment Comments Pt has now met all of her STGs for PT wound care and is progressing very well toward her remaining goals. One LTG added to reflect new issue of B heel fissures. Pt with overall improvement noted today, with increasing epithelialization to the dorsal L foot wound. The L heel fissure is smaller and clean/moist/pink today. The R heel fissure appears closed and the nearby open wound is much improved. Pt still with severe edema, and may benefit from very light compression soon, with care given to known vascular issues. Pt will continue to benefit from skilled PT wound care to continue  progress.  -     Rehab Potential Good  -     Patient/caregiver participated in establishment of treatment plan and goals Yes  -     Patient would benefit from skilled therapy intervention Yes  -        PT Plan    PT Frequency 1x/week  -     Predicted Duration of Therapy Intervention (PT) 4 visits  -     Planned CPT's? PT SELF CARE/MGMT/TRAIN 15 MIN: 73108;PT NONSELECT DEBRIDE 15 MIN: 96053;PT PIPE DEBRIDE OPEN WOUND UP TO 20 CM: 71249;PT MULTI LAYER COMP SYS LE  -     Physical Therapy Interventions (Optional Details) wound care;patient/family education  -     PT Plan Comments debridement prn, dressings, possible, light compression if needed  -           User Key  (r) = Recorded By, (t) = Taken By, (c) = Cosigned By    Initials Name Provider Type    Lucila Mohan, PT Physical Therapist                Goals   PT OP Goals     Row Name 22 1500          PT Short Term Goals    STG 1 Reduce wound dimensions by 25% as evidence of wound healing.  -     STG 1 Progress Met  -     STG 2 Increase granulation formation to 25% of wound bed or greater, to promote wound closure.  -     STG 2 Progress Met  -        Long Term Goals    LTG 1 Pt/family independent with home dressing changes.  -     LTG 1 Progress Met  -     LTG 2 L foot wound to be closed/resurfaced to allow for full indepenent function.  -     LTG 2 Progress Ongoing  -     LTG 3 B heel fissures to be closed to indicate healing progress.  -     LTG 3 Progress New  -        Time Calculation    PT Goal Re-Cert Due Date 23  -           User Key  (r) = Recorded By, (t) = Taken By, (c) = Cosigned By    Initials Name Provider Type    Lucila Mohan PT Physical Therapist                PT Goal Re-Cert Due Date: 23  PT Short Term Goals  STG 1: Reduce wound dimensions by 25% as evidence of wound healing.  STG 1 Progress: Met  STG 2: Increase granulation formation to 25% of wound bed or greater, to  promote wound closure.  STG 2 Progress: Met  Long Term Goals  LTG 1: Pt/family independent with home dressing changes.  LTG 1 Progress: Met  LTG 2: L foot wound to be closed/resurfaced to allow for full indepenent function.  LTG 2 Progress: Ongoing  LTG 3: B heel fissures to be closed to indicate healing progress.  LTG 3 Progress: New      Time Calculation: Start Time: 1030  Untimed Charges  60720-Knqfctwmy debridement: 25  Total Minutes  Untimed Charges Total Minutes: 25   Total Minutes: 25  Therapy Charges for Today     Code Description Service Date Service Provider Modifiers Qty    [id number]  PIPE DEBRIDE OPEN WOUND UP TO 20CM 12/21/2022 Lucila Ulrich, PT GP 1                  Lucila Ulrich, PT  12/21/2022

## 2022-12-23 ENCOUNTER — APPOINTMENT (OUTPATIENT)
Dept: PHYSICAL THERAPY | Facility: HOSPITAL | Age: 82
End: 2022-12-23
Payer: MEDICARE

## 2022-12-26 DIAGNOSIS — G62.9 NEUROPATHY: ICD-10-CM

## 2022-12-27 ENCOUNTER — PREP FOR SURGERY (OUTPATIENT)
Dept: OTHER | Facility: HOSPITAL | Age: 82
End: 2022-12-27

## 2022-12-27 DIAGNOSIS — I48.0 PAROXYSMAL ATRIAL FIBRILLATION: Primary | ICD-10-CM

## 2022-12-27 RX ORDER — NITROGLYCERIN 0.4 MG/1
0.4 TABLET SUBLINGUAL
Status: CANCELLED | OUTPATIENT
Start: 2022-12-27

## 2022-12-27 RX ORDER — SODIUM CHLORIDE 9 MG/ML
1 INJECTION, SOLUTION INTRAVENOUS CONTINUOUS
Status: CANCELLED | OUTPATIENT
Start: 2022-12-27 | End: 2022-12-27

## 2022-12-27 RX ORDER — CEFAZOLIN SODIUM 2 G/100ML
2 INJECTION, SOLUTION INTRAVENOUS ONCE
Status: CANCELLED | OUTPATIENT
Start: 2022-12-27 | End: 2022-12-27

## 2022-12-27 RX ORDER — SODIUM CHLORIDE 9 MG/ML
40 INJECTION, SOLUTION INTRAVENOUS AS NEEDED
Status: CANCELLED | OUTPATIENT
Start: 2022-12-27

## 2022-12-27 RX ORDER — SODIUM CHLORIDE 0.9 % (FLUSH) 0.9 %
10 SYRINGE (ML) INJECTION EVERY 12 HOURS SCHEDULED
Status: CANCELLED | OUTPATIENT
Start: 2022-12-27

## 2022-12-27 RX ORDER — ACETAMINOPHEN 325 MG/1
650 TABLET ORAL EVERY 4 HOURS PRN
Status: CANCELLED | OUTPATIENT
Start: 2022-12-27

## 2022-12-27 RX ORDER — ONDANSETRON 2 MG/ML
4 INJECTION INTRAMUSCULAR; INTRAVENOUS EVERY 6 HOURS PRN
Status: CANCELLED | OUTPATIENT
Start: 2022-12-27

## 2022-12-27 RX ORDER — SODIUM CHLORIDE 0.9 % (FLUSH) 0.9 %
1-10 SYRINGE (ML) INJECTION AS NEEDED
Status: CANCELLED | OUTPATIENT
Start: 2022-12-27

## 2022-12-27 RX ORDER — GABAPENTIN 100 MG/1
CAPSULE ORAL
Qty: 90 CAPSULE | Refills: 2 | Status: ON HOLD | OUTPATIENT
Start: 2022-12-27 | End: 2023-02-02 | Stop reason: SDUPTHER

## 2022-12-28 ENCOUNTER — DOCUMENTATION (OUTPATIENT)
Dept: CARDIOLOGY | Facility: HOSPITAL | Age: 82
End: 2022-12-28

## 2022-12-28 NOTE — PROGRESS NOTES
PRE-WATCHMAN HISTORY  Susan Anderson 1940   [address] [place] [address]   [phone] (home)     Referring MD: Dr. Ceballos   Information obtained from: [x] Medical record review  [x] Patient report  Scheduled for: Watchman implant on 1/9/2022 with Dr. Marlow  [facility] Visit: 10/28/22    History & Risk Factors (prior to Watchman implant)  [id number] Risk Factors:  Congestive HF     [] No   [x] Yes       If Yes, NYHA Class [] I [x] II [] III [] IV  LV Dysfunction   [] No   [x] Yes- recovered EF  Hypertension      [] No   [x] Yes  Diabetes    [] No   [x] Yes  Stroke       [x] No   [] Yes  TIA       [x] No   [] Yes  Thromboembolism    [x] No   [] Yes  Vascular Disease   [] No   [x] Yes       If Yes, Type   [x] CAD  [x] PAD      [] Aortic Plaque       HAS-BLED Risk Factors:  HTN (uncontrolled) [x] No  [] Yes  Abnormal Renal Fxn  [] No  [x] Yes  Abnormal Liver Fxn   [] No  [x] Yes  Stroke            [x] No  [] Yes  Bleeding event [] No  [x] Yes  Labile INR      [x] No  [] Yes  Alcohol  [x] No  [] Yes  Antiplatelets      [] No  [x] Yes  NSAIDS  [x] No  [] Yes    Additional Stroke & Bleeding Risk Factors:  Increased Fall Risk   [] No  [x] Yes- fall 5-6 weeks ago, no injury  Bleeding Event         [] No  [x] Yes      If Yes, Type      [] Intracranial [] Epistaxis   [x] GI   [] Other     Rhythm History:  AFIBTYPE: paroxysmal  Valvular AFib        [x] No  [] Yes  Attempt at AFib Termination:  [x] No  [] Yes  Atrial Flutter    [x] No  [] Yes  FARRUKH Intervention    [x] No  [] Yes    Additional History & Risk Factors:  Cardiomyopathy   [] No  [x] Yes  Chronic Lung Disease  [x] No  [] Yes  Coronary Artery Disease  [] No  [x] Yes; non-obstructive  Sleep Apnea    [] No  [x] Yes       If Yes, compliant with treatment [x] No  [] Yes  Epicardial Approach Considered [x] No  [] Yes    Diagnostic Studies:  Last Echo: 8/27/2022  [x] TTE Date: 8/27/2022                  EF: 55%              LA: 3.5cm             VHD: mild-mod MR           Pre-procedure blood thinner medications:  Continue clopidogrel. Hold 2 doses of Eliquis prior to procedure.        12/28/22 12:28 EST   LMPREWATCHMAN Revised 1.31.2017

## 2022-12-30 ENCOUNTER — PRE-ADMISSION TESTING (OUTPATIENT)
Dept: PREADMISSION TESTING | Facility: HOSPITAL | Age: 82
End: 2022-12-30
Payer: MEDICARE

## 2022-12-30 ENCOUNTER — HOSPITAL ENCOUNTER (OUTPATIENT)
Dept: PHYSICAL THERAPY | Facility: HOSPITAL | Age: 82
Setting detail: THERAPIES SERIES
Discharge: HOME | End: 2022-12-30
Payer: MEDICARE

## 2022-12-30 DIAGNOSIS — S81.811A ISTAP TYPE 2 SKIN TEAR OF RIGHT LOWER LEG: ICD-10-CM

## 2022-12-30 DIAGNOSIS — S91.301D OPEN WOUND OF RIGHT HEEL, SUBSEQUENT ENCOUNTER: ICD-10-CM

## 2022-12-30 DIAGNOSIS — S91.302D OPEN WOUND OF LEFT FOOT, SUBSEQUENT ENCOUNTER: Primary | ICD-10-CM

## 2022-12-30 DIAGNOSIS — I48.0 PAROXYSMAL ATRIAL FIBRILLATION: ICD-10-CM

## 2022-12-30 DIAGNOSIS — S91.302D OPEN WOUND OF LEFT HEEL, SUBSEQUENT ENCOUNTER: ICD-10-CM

## 2022-12-30 LAB
ALBUMIN SERPL-MCNC: 3.3 G/DL (ref 3.5–5.2)
ANION GAP SERPL CALCULATED.3IONS-SCNC: 12 MMOL/L (ref 5–15)
ANION GAP SERPL CALCULATED.3IONS-SCNC: 9 MMOL/L (ref 5–15)
BACTERIA UR QL AUTO: ABNORMAL /HPF
BASOPHILS # BLD AUTO: 0.02 10*3/MM3 (ref 0–0.2)
BASOPHILS NFR BLD AUTO: 0.4 % (ref 0–1.5)
BILIRUB UR QL STRIP: NEGATIVE
BUN SERPL-MCNC: 78 MG/DL (ref 8–23)
BUN SERPL-MCNC: 78 MG/DL (ref 8–23)
BUN/CREAT SERPL: 60.9 (ref 7–25)
BUN/CREAT SERPL: 66.7 (ref 7–25)
CALCIUM SPEC-SCNC: 8.5 MG/DL (ref 8.6–10.5)
CALCIUM SPEC-SCNC: 8.6 MG/DL (ref 8.6–10.5)
CHLORIDE SERPL-SCNC: 100 MMOL/L (ref 98–107)
CHLORIDE SERPL-SCNC: 99 MMOL/L (ref 98–107)
CLARITY UR: ABNORMAL
CO2 SERPL-SCNC: 30 MMOL/L (ref 22–29)
CO2 SERPL-SCNC: 32 MMOL/L (ref 22–29)
COLOR UR: YELLOW
CREAT SERPL-MCNC: 1.17 MG/DL (ref 0.57–1)
CREAT SERPL-MCNC: 1.28 MG/DL (ref 0.57–1)
CREAT UR-MCNC: 41.7 MG/DL
DEPRECATED RDW RBC AUTO: 54.2 FL (ref 37–54)
DEPRECATED RDW RBC AUTO: 54.3 FL (ref 37–54)
EGFRCR SERPLBLD CKD-EPI 2021: 41.7 ML/MIN/1.73
EGFRCR SERPLBLD CKD-EPI 2021: 46.4 ML/MIN/1.73
EOSINOPHIL # BLD AUTO: 0.05 10*3/MM3 (ref 0–0.4)
EOSINOPHIL NFR BLD AUTO: 1 % (ref 0.3–6.2)
ERYTHROCYTE [DISTWIDTH] IN BLOOD BY AUTOMATED COUNT: 16.4 % (ref 12.3–15.4)
ERYTHROCYTE [DISTWIDTH] IN BLOOD BY AUTOMATED COUNT: 16.5 % (ref 12.3–15.4)
GLUCOSE SERPL-MCNC: 184 MG/DL (ref 65–99)
GLUCOSE SERPL-MCNC: 188 MG/DL (ref 65–99)
GLUCOSE UR STRIP-MCNC: NEGATIVE MG/DL
HBA1C MFR BLD: 8.1 % (ref 4.8–5.6)
HCT VFR BLD AUTO: 35.1 % (ref 34–46.6)
HCT VFR BLD AUTO: 35.5 % (ref 34–46.6)
HGB BLD-MCNC: 10.6 G/DL (ref 12–15.9)
HGB BLD-MCNC: 10.8 G/DL (ref 12–15.9)
HGB UR QL STRIP.AUTO: ABNORMAL
HYALINE CASTS UR QL AUTO: ABNORMAL /LPF
IMM GRANULOCYTES # BLD AUTO: 0.02 10*3/MM3 (ref 0–0.05)
IMM GRANULOCYTES NFR BLD AUTO: 0.4 % (ref 0–0.5)
INR PPP: 1.2 (ref 0.84–1.13)
KETONES UR QL STRIP: NEGATIVE
LEUKOCYTE ESTERASE UR QL STRIP.AUTO: ABNORMAL
LYMPHOCYTES # BLD AUTO: 0.84 10*3/MM3 (ref 0.7–3.1)
LYMPHOCYTES NFR BLD AUTO: 17.6 % (ref 19.6–45.3)
MCH RBC QN AUTO: 27.5 PG (ref 26.6–33)
MCH RBC QN AUTO: 27.8 PG (ref 26.6–33)
MCHC RBC AUTO-ENTMCNC: 29.9 G/DL (ref 31.5–35.7)
MCHC RBC AUTO-ENTMCNC: 30.8 G/DL (ref 31.5–35.7)
MCV RBC AUTO: 90.5 FL (ref 79–97)
MCV RBC AUTO: 92 FL (ref 79–97)
MONOCYTES # BLD AUTO: 0.55 10*3/MM3 (ref 0.1–0.9)
MONOCYTES NFR BLD AUTO: 11.5 % (ref 5–12)
NEUTROPHILS NFR BLD AUTO: 3.29 10*3/MM3 (ref 1.7–7)
NEUTROPHILS NFR BLD AUTO: 69.1 % (ref 42.7–76)
NITRITE UR QL STRIP: POSITIVE
NRBC BLD AUTO-RTO: 0 /100 WBC (ref 0–0.2)
PH UR STRIP.AUTO: 5.5 [PH] (ref 5–8)
PHOSPHATE SERPL-MCNC: 3.7 MG/DL (ref 2.5–4.5)
PLATELET # BLD AUTO: 216 10*3/MM3 (ref 140–450)
PLATELET # BLD AUTO: 234 10*3/MM3 (ref 140–450)
PMV BLD AUTO: 10.4 FL (ref 6–12)
PMV BLD AUTO: 10.5 FL (ref 6–12)
POTASSIUM SERPL-SCNC: 3.8 MMOL/L (ref 3.5–5.2)
POTASSIUM SERPL-SCNC: 3.9 MMOL/L (ref 3.5–5.2)
PROT ?TM UR-MCNC: 298 MG/DL
PROT UR QL STRIP: ABNORMAL
PROTHROMBIN TIME: 15.2 SECONDS (ref 11.4–14.4)
RBC # BLD AUTO: 3.86 10*6/MM3 (ref 3.77–5.28)
RBC # BLD AUTO: 3.88 10*6/MM3 (ref 3.77–5.28)
RBC # UR STRIP: ABNORMAL /HPF
REF LAB TEST METHOD: ABNORMAL
SODIUM SERPL-SCNC: 140 MMOL/L (ref 136–145)
SODIUM SERPL-SCNC: 142 MMOL/L (ref 136–145)
SP GR UR STRIP: 1.01 (ref 1–1.03)
SQUAMOUS #/AREA URNS HPF: ABNORMAL /HPF
UROBILINOGEN UR QL STRIP: ABNORMAL
WBC # UR STRIP: ABNORMAL /HPF
WBC NRBC COR # BLD: 4.77 10*3/MM3 (ref 3.4–10.8)
WBC NRBC COR # BLD: 4.89 10*3/MM3 (ref 3.4–10.8)

## 2022-12-30 PROCEDURE — 85027 COMPLETE CBC AUTOMATED: CPT

## 2022-12-30 PROCEDURE — 85610 PROTHROMBIN TIME: CPT

## 2022-12-30 PROCEDURE — 80069 RENAL FUNCTION PANEL: CPT

## 2022-12-30 PROCEDURE — 83036 HEMOGLOBIN GLYCOSYLATED A1C: CPT

## 2022-12-30 PROCEDURE — 97597 DBRDMT OPN WND 1ST 20 CM/<: CPT

## 2022-12-30 PROCEDURE — 82570 ASSAY OF URINE CREATININE: CPT

## 2022-12-30 PROCEDURE — 36415 COLL VENOUS BLD VENIPUNCTURE: CPT

## 2022-12-30 PROCEDURE — 81001 URINALYSIS AUTO W/SCOPE: CPT

## 2022-12-30 PROCEDURE — 80048 BASIC METABOLIC PNL TOTAL CA: CPT

## 2022-12-30 PROCEDURE — 85025 COMPLETE CBC W/AUTO DIFF WBC: CPT

## 2022-12-30 PROCEDURE — 84156 ASSAY OF PROTEIN URINE: CPT

## 2022-12-30 RX ORDER — MULTIPLE VITAMINS W/ MINERALS TAB 9MG-400MCG
1 TAB ORAL DAILY
COMMUNITY

## 2022-12-30 RX ORDER — OMEPRAZOLE 20 MG/1
20 CAPSULE, DELAYED RELEASE ORAL 2 TIMES DAILY
COMMUNITY

## 2022-12-30 RX ORDER — BUMETANIDE 2 MG/1
2 TABLET ORAL DAILY
COMMUNITY
End: 2023-01-20 | Stop reason: HOSPADM

## 2022-12-30 NOTE — PAT
An arrival time for procedure was not provided during PAT visit. If patient had any questions or concerns about their arrival time, they were instructed to contact their surgeon/physician.  Additionally, if the patient referred to an arrival time that was acquired from their my chart account, patient was encouraged to verify that time with their surgeon/physician. Arrival times are NOT provided in Pre Admission Testing Department.    Called Dr. Ceballos's office for cardiac clearance and okay to hold Plavix and Eliquis.  Nurse states letter will be in Epic later today.

## 2022-12-30 NOTE — THERAPY WOUND CARE TREATMENT
Outpatient Rehabilitation - Wound/Debridement Treatment Note  [facility]     Patient Name: Susan Anderson  : 1940  MRN: [id number]  Today's Date: 2022                 Admit Date: 2022    Visit Dx:    ICD-10-CM ICD-9-CM   1. Open wound of left foot, subsequent encounter  S91.302D V58.89     892.0   2. Open wound of left heel, subsequent encounter  S91.302D V58.89     892.0   3. Open wound of right heel, subsequent encounter  S91.301D V58.89     892.0   4. ISTAP type 2 skin tear of right lower leg  S81.811A 891.0     L dorsal foot      *NEW* R anterior knee skin tear      Patient Active Problem List   Diagnosis   • Diabetic foot ulcer ([facility])   • PVD (peripheral vascular disease) ([facility])   • Osteomyelitis ([facility])   • Diabetes mellitus with neuropathy ([facility])   • Essential hypertension   • Stage 3a chronic kidney disease ([facility])   • Pyogenic inflammation of bone ([facility])   • Hyperglycemia   • Pneumonia due to infectious organism   • Mitral valve disease   • NICM (nonischemic cardiomyopathy) ([facility])   • Coronary artery disease involving native coronary artery of native heart without angina pectoris   • Dyslipidemia   • Chronic combined systolic and diastolic heart failure ([facility])   • Lumbar stenosis with neurogenic claudication   • Spondylosis of lumbar region without myelopathy or radiculopathy   • Spondylolisthesis, lumbar region   • Degeneration of lumbar or lumbosacral intervertebral disc   • Gait disturbance   • Physical deconditioning   • Sarcopenia   • Weakness   • Anemia, chronic disease   • Fall   • Dizziness   • Demand ischemia ([facility])   • Effusion of right knee   • Right knee pain   • Pressure injury of skin of sacral region   • Sleep apnea   • GIB (gastrointestinal bleeding)   • Vasovagal syncope   • Hypomagnesemia   • Syncope, unspecified syncope type   • Paroxysmal atrial fibrillation ([facility])   • Ulcer of esophagus without bleeding        Past Medical History:   Diagnosis Date   • Anxiety    •  Arthritis    • Asthma 6/4 - double pneumonia    Currently on inhaler and nebulizer   • Atrial fibrillation (HCC) 08/21/2022   • Cancer (HCC)     cervical cancer, skin cancer   • CHF (congestive heart failure) (HCC) 06/04/2021   • Chronic kidney disease Related to diabetes   • Coronary artery disease 6/4/2021 DX for hear failure   • Diabetes mellitus (HCC) 30 years    Seeing Dr. Lam 1st time Aug 19   • GERD (gastroesophageal reflux disease)    • Gout    • History of staph infection 10/2021    right toe   • History of transfusion     no reactions, 1 unit, [facility]   • Hx of colonoscopy    • Hyperlipidemia Reference current labs x 2-3yrs approx   • Hypertension 30 years   • Insomnia    • Migraine    • Mitral valve disease    • Mitral valve disease    • Osteomyelitis (HCC)    • Peripheral neuropathy    • Sleep apnea    • Type 2 diabetes mellitus (HCC)     30 years        Past Surgical History:   Procedure Laterality Date   • ABDOMINAL HYSTERECTOMY W/SALPINGECTOMY     • AMPUTATION  Right great toe, 1st 1/3 metatarsal -[facility] 4/14/21   • AORTAGRAM N/A 04/09/2021    Procedure: AORTAGRAM WITH OR WITHOUT RUNOFFS, WITH Co2;  Surgeon: Trey Forrest MD;  Location:  [facility];  Service: Vascular;  Laterality: N/A;   • AORTAGRAM N/A 09/28/2022    Procedure: CO2 ANGIOGRAM, LEFT SFA ANGIOPLASTY WITH DRUG ELUTING BALLOON, LEFT SFA STENT PLACEMENT ;  Surgeon: Trey Forrest MD;  Location:  [facility];  Service: Vascular;  Laterality: N/A;  FLUORO: 13.12  DOSE 162mGy  CONTRAST: Isovue 350: 15ml   • APPENDECTOMY     • BRAIN TUMOR EXCISION      laser surgery    • CARDIAC CATHETERIZATION N/A 06/21/2021    Procedure: LEFT HEART CATH;  Surgeon: Anjum Ceballos MD;  Location:  [facility] CATH INVASIVE LOCATION;  Service: Cardiology;  Laterality: N/A;   • CATARACT EXTRACTION W/ INTRAOCULAR LENS  IMPLANT, BILATERAL     • CHOLECYSTECTOMY     • COLONOSCOPY     • ENDOSCOPY N/A 10/07/2022    Procedure: ESOPHAGOGASTRODUODENOSCOPY;  Surgeon:  "Stef Veras MD;  Location: [facility] ENDOSCOPY;  Service: Gastroenterology;  Laterality: N/A;   • EYE SURGERY     • FEMORAL ARTERY STENT  10/2022   • HYSTERECTOMY     • TOE SURGERY     • TRANS METATARSAL AMPUTATION Right 04/14/2021    Procedure: AMPUTATION TRANS METATARSAL RIGHT GREAT TOE;  Surgeon: Valdez Finney MD;  Location: [facility] OR;  Service: Vascular;  Laterality: Right;         EVALUATION   PT Ortho     Row Name 12/30/22 1400       Subjective Comments    Subjective Comments Pt reports new wound to R anterior knee where she hit her leg on the leg rest bracket on her wheelchair. Reports pt has been taking increased dose of lasix and her edema is improving.  -       Subjective Pain    Able to rate subjective pain? yes  -    Pre-Treatment Pain Level 4  -LH    Post-Treatment Pain Level 4  -    Subjective Pain Comment FACES, B heels, R knee  -       Transfers    Comment, (Transfers) remained seated for tx  -          User Key  (r) = Recorded By, (t) = Taken By, (c) = Cosigned By    Initials Name Provider Type     López Graham, PT Physical Therapist                 [facility] Wound     Row Name 12/30/22 1400             Wound 12/01/22 1355 Left posterior heel Fissure    Wound - Properties Group Placement Date: 12/01/22  - Placement Time: 1355  - Present on Hospital Admission: N  - Side: Left  - Orientation: posterior  - Location: heel  - Primary Wound Type: Fissure  -MC    Dressing Appearance intact;moist drainage  -      Base moist;pink;red  -      Periwound intact;pink;blanchable;moist;pale white;macerated  -      Periwound Temperature warm  -      Periwound Skin Turgor soft  -      Edges irregular;open  -      Drainage Characteristics/Odor serous  -LH      Drainage Amount scant  -LH      Care, Wound cleansed with;wound cleanser;debrided  -      Dressing Care dressing applied;silver impregnated;low-adherent;foam;border dressing  mepilex Ag, 6\" optifoam  -      Periwound " "Care cleansed with pH balanced cleanser;barrier ointment applied  zguard  -      Retired Wound - Properties Group Placement Date: 12/01/22  [facility] [place] Placement Time: 1355  - Present on Hospital Admission: N  - Side: Left  - Orientation: posterior  - Location: heel  -MC Primary Wound Type: Fissure  -MC    Retired Wound - Properties Group Date first assessed: 12/01/22  - Time first assessed: 1355  - Present on Hospital Admission: N  - Side: Left  - Location: heel  -MC Primary Wound Type: Fissure  -MC       Wound 12/01/22 1355 Right posterior heel Fissure    Wound - Properties Group Placement Date: 12/01/22  - Placement Time: 1355  - Present on Hospital Admission: N  - Side: Right  - Orientation: posterior  - Location: heel  -MC Primary Wound Type: Fissure  -MC    Dressing Appearance intact;moist drainage  -LH      Base moist;pink  fissure closed, nearby pinpoint area moist, pink  -      Periwound intact;dry;blanchable;pink  -      Periwound Temperature warm  -      Periwound Skin Turgor soft  -      Edges irregular;open  -      Wound Length (cm) 0.1 cm  -      Wound Width (cm) 0.1 cm  -      Wound Depth (cm) 0.1 cm  -      Wound Surface Area (cm^2) 0.01 cm^2  -LH      Wound Volume (cm^3) 0.001 cm^3  -LH      Drainage Characteristics/Odor serous  -LH      Drainage Amount scant  -      Care, Wound cleansed with;wound cleanser;debrided  -      Dressing Care dressing applied;silver impregnated;low-adherent;foam;collagen;border dressing  mepilex Ag, 6\" optifoam  -      Periwound Care cleansed with pH balanced cleanser;barrier ointment applied  zguard  -      Retired Wound - Properties Group Placement Date: 12/01/22  - Placement Time: 1355  - Present on Hospital Admission: N  - Side: Right  - Orientation: posterior  - Location: heel  -MC Primary Wound Type: Fissure  -MC    Retired Wound - Properties Group Date first assessed: 12/01/22  - Time first assessed: 1355  " "- Present on Hospital Admission: N  -MC Side: Right  - Location: heel  - Primary Wound Type: Fissure  -MC       Wound 10/08/22 1400 Left anterior foot Arterial Ulcer    Wound - Properties Group Placement Date: 10/08/22  - Placement Time: 1400  -MF Side: Left  - Orientation: anterior  -MF Location: foot  -MF Primary Wound Type: Arterial ulc  -MF    Wound Image Images linked: 1  -LH      Dressing Appearance intact;moist drainage  -LH      Base moist;pink;red;granulating;yellow;slough  Medial area closed, lateral area moist and pink.  -      Periwound intact;dry;redness;warm  -      Periwound Temperature warm  -      Periwound Skin Turgor soft  -      Edges open  -      Wound Length (cm) 0.2 cm  -      Wound Width (cm) 0.2 cm  -      Wound Depth (cm) 0.1 cm  -      Wound Surface Area (cm^2) 0.04 cm^2  -LH      Wound Volume (cm^3) 0.004 cm^3  -      Drainage Characteristics/Odor serosanguineous  -      Drainage Amount small  -LH      Care, Wound cleansed with;wound cleanser;debrided  -      Dressing Care dressing applied;silver impregnated;collagen;antimicrobial agent applied;foam;low-adherent;border dressing  carolee, saline-moist HFBc, 4\" optifoam  -      Periwound Care cleansed with pH balanced cleanser;dry periwound area maintained  -      Retired Wound - Properties Group Placement Date: 10/08/22  - Placement Time: 1400  -MF Side: Left  - Orientation: anterior  - Location: foot  -MF Primary Wound Type: Arterial ulc  -MF    Retired Wound - Properties Group Date first assessed: 10/08/22  - Time first assessed: 1400  -MF Side: Left  - Location: foot  -MF Primary Wound Type: Arterial ulc  -MF       Wound 12/30/22 1345 Right anterior knee Skin Tear    Wound - Properties Group Placement Date: 12/30/22  - Placement Time: 1345  -LH Present on Hospital Admission: Y  -LH Side: Right  -LH Orientation: anterior  -LH Location: knee  -LH Primary Wound Type: Skin tear  -LH    Wound " "Image Images linked: 1  -LH      Dressing Appearance intact;moist drainage  bloody drainage  -      Base moist;pink;red;other (see comments)  partially adhered skin flap  -LH      Periwound intact;dry;pink  -LH      Periwound Temperature warm  -      Periwound Skin Turgor soft  -LH      Edges open  -      Wound Length (cm) 2.2 cm  -LH      Wound Width (cm) 2.3 cm  -LH      Wound Depth (cm) 0.1 cm  -LH      Wound Surface Area (cm^2) 5.06 cm^2  -LH      Wound Volume (cm^3) 0.506 cm^3  -LH      Drainage Characteristics/Odor sanguineous  -      Drainage Amount small  seeping bleeding  -      Care, Wound cleansed with;wound cleanser  -      Dressing Care dressing changed;petroleum-based;gauze;low-adherent;foam  Xeroform, 1/2 4x4 gauze, 4\" optifoam  -      Periwound Care cleansed with pH balanced cleanser;dry periwound area maintained  -      Retired Wound - Properties Group Placement Date: 12/30/22  - Placement Time: 1345  - Present on Hospital Admission: Y  -LH Side: Right  -LH Orientation: anterior  -LH Location: knee  -LH Primary Wound Type: Skin tear  -LH    Retired Wound - Properties Group Date first assessed: 12/30/22  - Time first assessed: 1345  - Present on Hospital Admission: Y  -LH Side: Right  -LH Location: knee  -LH Primary Wound Type: Skin tear  -LH          User Key  (r) = Recorded By, (t) = Taken By, (c) = Cosigned By    Initials Name Provider Type    Avtar Small, PT Physical Therapist    Lucila Mohan, PT Physical Therapist     López Graham, PT Physical Therapist                  WOUND DEBRIDEMENT  Total area of Debridement: 2cm2  Debridement Site 1  Location- Site 1: L foot dorsal wound  Selective Debridement- Site 1: Wound Surface <20cmsq  Instruments- Site 1: tweezers  Excised Tissue Description- Site 1: minimum, slough  Bleeding- Site 1: none   Debridement Site 2  Location- Site 2: B foot fissures  Selective Debridement- Site 2: Wound Surface " "<20cmsq  Instruments- Site 2: #15, scapel, tweezers  Excised Tissue Description- Site 2: scant, other (comment) (dry skin)  Bleeding- Site 2: none          Therapy Education     Row Name 12/30/22 1400             Therapy Education    Education Details May discontinue use of carolee on heels. Continue with carolee at L dorsal foot. May use xeroform to R anterior knee although if there is no bleeding during dressing change transition to Mepilex Ag and 4\" optifoam.  -      Given Symptoms/condition management;Bandaging/dressing change  -      Program Reinforced;Modified  -      How Provided Verbal;Demonstration  -      Provided to Patient;Caregiver  daughter  -      Level of Understanding Verbalized;Teach back education performed  -            User Key  (r) = Recorded By, (t) = Taken By, (c) = Cosigned By    Initials Name Provider Type     López Graham, PT Physical Therapist                Recommendation and Plan   PT Assessment/Plan     Row Name 12/30/22 1400          PT Assessment    Functional Limitations Performance in self-care ADL;Impaired gait;Other (comment)  wound management limitations  -     Impairments Impaired arterial circulation;Integumentary integrity;Pain;Impaired sensory integrity  -     Assessment Comments Pt's bilateral heel wound continuing to demonstrate steady improvements in wound dimensions as pt's R heel with only pinpoint area remaining. Pt's L dorsal foot also with only one pinpoint area remaining as the medial portion of the wound has epithelialized. Pt also demonstrating improvements in BLE edema with recent change in diuretic dosage, so no LE compression placed this date. Pt with new wound to the R knee where pt hit her leg on her wheelchair causing and ISTAP type 2 skin tear. Pt with seeping bleeding managed with prolonged pressure. PT applied xeroform to wound to reduce adherence of wound dressings and reduce future bleeding. Pt would continue to benefit from skilled " wound care to promote wound healing.  -     Rehab Potential Good  -     Patient/caregiver participated in establishment of treatment plan and goals Yes  -     Patient would benefit from skilled therapy intervention Yes  -        PT Plan    PT Frequency 1x/week  -     Physical Therapy Interventions (Optional Details) wound care;patient/family education  -     PT Plan Comments debridement prn, dressings, possible, light compression if needed  -           User Key  (r) = Recorded By, (t) = Taken By, (c) = Cosigned By    Initials Name Provider Type     López Graham, PT Physical Therapist                Goals   PT OP Goals     Row Name 12/30/22 1435          Time Calculation    PT Goal Re-Cert Due Date 01/01/23  -           User Key  (r) = Recorded By, (t) = Taken By, (c) = Cosigned By    Initials Name Provider Type     López Graham, PT Physical Therapist                PT Goal Re-Cert Due Date: 01/01/23            Time Calculation: Start Time: 1300  Untimed Charges  [id number] debridement: 25  Total Minutes  Untimed Charges Total Minutes: 25   Total Minutes: 25  Therapy Charges for Today     Code Description Service Date Service Provider Modifiers Qty    [id number]  PIPE DEBRIDE OPEN WOUND UP TO 20CM 12/30/2022 López Graham, PT GP 1                  López Graham PT  12/30/2022

## 2022-12-30 NOTE — TELEPHONE ENCOUNTER
Instructions received from Dr. Becerra.  Documentation in Letters.    She can hold apixaban for 48 hours prior to the procedure and resume afterwards       Zakia SOOD gave OK to hold Plavix 5 days prior to EGD on 1/3/23.  Pre-admission testing states patient has not been holding Plavix and needs instructions for holding Eliquis, as well.    Please advise.

## 2022-12-30 NOTE — DISCHARGE INSTRUCTIONS
The following information and instructions were given:    Do not eat, drink, smoke or chew gum after midnight the night before surgery. This includes no mints.  Take all routine, prescribed medications including heart and blood pressure medicines with a sip of water unless otherwise instructed by your physician.   Do NOT take diabetic medication unless instructed by your physician.    DO NOT shave for two days before your procedure.  Do not wear makeup.      DO NOT wear fingernail polish (gel/regular) and/or acrylic/artificial nails on the day of surgery.   If you had a recent manicure and would rather not remove polish or artificial nails, the minimum requirement is that the polish/artificial nails must be removed from the middle finger on each hand.      Remove all jewelry (advise to go to jeweler if unable to remove).  Jewelry, especially rings, can no longer be taped for surgery.    Leave anything you consider valuable at home.    Bring the following with you the day of your procedure (when applicable):       -Picture ID and insurance cards       -Co-pay/deductible required by insurance       -Medications in the original bottles (not a list) including all over-the-counter medications if not brought to PAT       -Copy of advance directive, living will or power of  documents if not brought to PAT       -PAT Pass    Educational handout related to procedure given to patient.  Educational handout also includes general information related to their recovery that mentions signs and symptoms of infection and when to call the doctor.    Dear Patient,    Drink plenty of fluids the day before your procedure to ensure you are well hydrated, unless otherwise directed by your physician.    Do NOT eat after midnight the night before your procedure.   You may have clear liquids only up to three hours before your scheduled arrival time (Water is best, but clear liquids can also include coffee without cream or milk, fruit  juice without pulp, clear broth, and clear gelatin).  During the three hour pre-procedure timeframe, NOTHING BY MOUTH (NPO).    We encourage you to drink 8 ounces of water three to four hours before your scheduled arrival time.    Take your medications as instructed by your doctor.    Following your procedure, be sure to drink plenty of fluids to continue flushing the kidneys if dye was utilized during your procedure (cardiac catheterization)    Benefits of hydrating before and after your procedure include:    -Improved hydration helps prevent potential harm to the kidneys by flushing the contrast/dye used during your procedure (if applicable)    -Lower post-procedure complications    -Improved patient comfort     Do NOT smoke after midnight the night before your procedure.    Glasses and jewelry may be worn, but dentures must be removed prior to your procedure.    Leave any items you consider valuable at home.      MORNING of your Procedure, please bring the following:     -Photo ID and insurance card(s)    -ALL medications in their ORIGINAL CONTAINERS    -Co-pay and/or deductible required by your insurance   -Copy of living will or power of  document (if not brought to Pre-Admission Testing department)   -CPAP mask and tubing, not your machine (if applicable)   -Relaxation aids (music, books, magazines)    Family members may wait in [facility] waiting area during procedure.    Need to make arrangements for transportation prior to discharge.    A handout regarding \"Heart Healthy Eating\" was provided today to encourage healthy eating habits.

## 2023-01-01 ENCOUNTER — TELEPHONE (OUTPATIENT)
Dept: INTERNAL MEDICINE | Facility: CLINIC | Age: 83
End: 2023-01-01

## 2023-01-01 ENCOUNTER — TELEPHONE (OUTPATIENT)
Dept: CARDIAC SURGERY | Facility: CLINIC | Age: 83
End: 2023-01-01
Payer: MEDICARE

## 2023-01-01 ENCOUNTER — TELEPHONE (OUTPATIENT)
Dept: INTERNAL MEDICINE | Facility: CLINIC | Age: 83
End: 2023-01-01
Payer: MEDICARE

## 2023-01-01 ENCOUNTER — TELEPHONE (OUTPATIENT)
Dept: CARDIAC SURGERY | Facility: CLINIC | Age: 83
End: 2023-01-01

## 2023-01-03 ENCOUNTER — ANESTHESIA (OUTPATIENT)
Dept: GASTROENTEROLOGY | Facility: HOSPITAL | Age: 83
DRG: 917 | End: 2023-01-03
Payer: MEDICARE

## 2023-01-03 ENCOUNTER — APPOINTMENT (OUTPATIENT)
Dept: GENERAL RADIOLOGY | Facility: HOSPITAL | Age: 83
DRG: 917 | End: 2023-01-03
Payer: MEDICARE

## 2023-01-03 ENCOUNTER — HOSPITAL ENCOUNTER (INPATIENT)
Facility: HOSPITAL | Age: 83
LOS: 17 days | Discharge: HOME HEALTH | DRG: 917 | End: 2023-01-20
Attending: INTERNAL MEDICINE | Admitting: STUDENT IN AN ORGANIZED HEALTH CARE EDUCATION/TRAINING PROGRAM
Payer: MEDICARE

## 2023-01-03 ENCOUNTER — APPOINTMENT (OUTPATIENT)
Dept: CARDIOLOGY | Facility: HOSPITAL | Age: 83
DRG: 917 | End: 2023-01-03
Payer: MEDICARE

## 2023-01-03 ENCOUNTER — ANESTHESIA EVENT (OUTPATIENT)
Dept: GASTROENTEROLOGY | Facility: HOSPITAL | Age: 83
DRG: 917 | End: 2023-01-03
Payer: MEDICARE

## 2023-01-03 ENCOUNTER — TELEPHONE (OUTPATIENT)
Dept: CARDIOLOGY | Facility: CLINIC | Age: 83
End: 2023-01-03
Payer: MEDICARE

## 2023-01-03 DIAGNOSIS — K92.2 GASTROINTESTINAL HEMORRHAGE, UNSPECIFIED GASTROINTESTINAL HEMORRHAGE TYPE: ICD-10-CM

## 2023-01-03 DIAGNOSIS — I46.9 CARDIAC ARREST: ICD-10-CM

## 2023-01-03 DIAGNOSIS — I48.0 PAROXYSMAL ATRIAL FIBRILLATION: ICD-10-CM

## 2023-01-03 DIAGNOSIS — D63.8 ANEMIA, CHRONIC DISEASE: ICD-10-CM

## 2023-01-03 DIAGNOSIS — M51.37 DEGENERATION OF LUMBAR OR LUMBOSACRAL INTERVERTEBRAL DISC: ICD-10-CM

## 2023-01-03 DIAGNOSIS — I50.42 CHRONIC COMBINED SYSTOLIC AND DIASTOLIC HEART FAILURE: Chronic | ICD-10-CM

## 2023-01-03 DIAGNOSIS — J18.9 PNEUMONIA DUE TO INFECTIOUS ORGANISM, UNSPECIFIED LATERALITY, UNSPECIFIED PART OF LUNG: ICD-10-CM

## 2023-01-03 DIAGNOSIS — K22.10 ULCER OF ESOPHAGUS WITHOUT BLEEDING: ICD-10-CM

## 2023-01-03 DIAGNOSIS — R26.9 GAIT DISTURBANCE: ICD-10-CM

## 2023-01-03 DIAGNOSIS — R13.13 PHARYNGEAL DYSPHAGIA: Primary | ICD-10-CM

## 2023-01-03 PROBLEM — I10 ESSENTIAL HYPERTENSION: Chronic | Status: ACTIVE | Noted: 2021-04-08

## 2023-01-03 PROBLEM — E78.5 DYSLIPIDEMIA: Chronic | Status: ACTIVE | Noted: 2021-08-12

## 2023-01-03 PROBLEM — E11.9 T2DM (TYPE 2 DIABETES MELLITUS): Chronic | Status: ACTIVE | Noted: 2023-01-03

## 2023-01-03 PROBLEM — E11.9 T2DM (TYPE 2 DIABETES MELLITUS): Status: ACTIVE | Noted: 2023-01-03

## 2023-01-03 PROBLEM — I25.10 CORONARY ARTERY DISEASE INVOLVING NATIVE CORONARY ARTERY OF NATIVE HEART WITHOUT ANGINA PECTORIS: Chronic | Status: ACTIVE | Noted: 2021-08-12

## 2023-01-03 LAB
ALBUMIN SERPL-MCNC: 2.6 G/DL (ref 3.5–5.2)
ALBUMIN/GLOB SERPL: 0.9 G/DL
ALP SERPL-CCNC: 252 U/L (ref 39–117)
ALT SERPL W P-5'-P-CCNC: 53 U/L (ref 1–33)
ANION GAP SERPL CALCULATED.3IONS-SCNC: 12 MMOL/L (ref 5–15)
AST SERPL-CCNC: 80 U/L (ref 1–32)
BILIRUB SERPL-MCNC: 0.6 MG/DL (ref 0–1.2)
BUN SERPL-MCNC: 65 MG/DL (ref 8–23)
BUN/CREAT SERPL: 51.2 (ref 7–25)
CALCIUM SPEC-SCNC: 8.1 MG/DL (ref 8.6–10.5)
CHLORIDE SERPL-SCNC: 101 MMOL/L (ref 98–107)
CO2 SERPL-SCNC: 28 MMOL/L (ref 22–29)
CREAT SERPL-MCNC: 1.27 MG/DL (ref 0.57–1)
D-LACTATE SERPL-SCNC: 1 MMOL/L (ref 0.5–2)
DEPRECATED RDW RBC AUTO: 53.6 FL (ref 37–54)
EGFRCR SERPLBLD CKD-EPI 2021: 42 ML/MIN/1.73
ERYTHROCYTE [DISTWIDTH] IN BLOOD BY AUTOMATED COUNT: 16.4 % (ref 12.3–15.4)
GLOBULIN UR ELPH-MCNC: 2.9 GM/DL
GLUCOSE BLDC GLUCOMTR-MCNC: 122 MG/DL (ref 70–130)
GLUCOSE BLDC GLUCOMTR-MCNC: 215 MG/DL (ref 70–130)
GLUCOSE BLDC GLUCOMTR-MCNC: 218 MG/DL (ref 70–130)
GLUCOSE SERPL-MCNC: 233 MG/DL (ref 65–99)
HCT VFR BLD AUTO: 29.4 % (ref 34–46.6)
HGB BLD-MCNC: 9.1 G/DL (ref 12–15.9)
MAGNESIUM SERPL-MCNC: 2.2 MG/DL (ref 1.6–2.4)
MCH RBC QN AUTO: 27.7 PG (ref 26.6–33)
MCHC RBC AUTO-ENTMCNC: 31 G/DL (ref 31.5–35.7)
MCV RBC AUTO: 89.6 FL (ref 79–97)
NT-PROBNP SERPL-MCNC: ABNORMAL PG/ML (ref 0–1800)
PLATELET # BLD AUTO: 218 10*3/MM3 (ref 140–450)
PMV BLD AUTO: 11.3 FL (ref 6–12)
POTASSIUM SERPL-SCNC: 3.9 MMOL/L (ref 3.5–5.2)
PROT SERPL-MCNC: 5.5 G/DL (ref 6–8.5)
RBC # BLD AUTO: 3.28 10*6/MM3 (ref 3.77–5.28)
SODIUM SERPL-SCNC: 141 MMOL/L (ref 136–145)
TROPONIN T SERPL-MCNC: 0.25 NG/ML (ref 0–0.03)
TROPONIN T SERPL-MCNC: 0.26 NG/ML (ref 0–0.03)
WBC NRBC COR # BLD: 5.49 10*3/MM3 (ref 3.4–10.8)

## 2023-01-03 PROCEDURE — 84484 ASSAY OF TROPONIN QUANT: CPT | Performed by: INTERNAL MEDICINE

## 2023-01-03 PROCEDURE — 84484 ASSAY OF TROPONIN QUANT: CPT | Performed by: NURSE PRACTITIONER

## 2023-01-03 PROCEDURE — 99291 CRITICAL CARE FIRST HOUR: CPT | Performed by: INTERNAL MEDICINE

## 2023-01-03 PROCEDURE — 83735 ASSAY OF MAGNESIUM: CPT | Performed by: INTERNAL MEDICINE

## 2023-01-03 PROCEDURE — 83605 ASSAY OF LACTIC ACID: CPT | Performed by: INTERNAL MEDICINE

## 2023-01-03 PROCEDURE — 25010000002 ATROPINE PER 0.01 MG: Performed by: NURSE ANESTHETIST, CERTIFIED REGISTERED

## 2023-01-03 PROCEDURE — 5A12012 PERFORMANCE OF CARDIAC OUTPUT, SINGLE, MANUAL: ICD-10-PCS | Performed by: INTERNAL MEDICINE

## 2023-01-03 PROCEDURE — 88305 TISSUE EXAM BY PATHOLOGIST: CPT | Performed by: INTERNAL MEDICINE

## 2023-01-03 PROCEDURE — S0260 H&P FOR SURGERY: HCPCS | Performed by: INTERNAL MEDICINE

## 2023-01-03 PROCEDURE — 83880 ASSAY OF NATRIURETIC PEPTIDE: CPT | Performed by: INTERNAL MEDICINE

## 2023-01-03 PROCEDURE — 63710000001 INSULIN LISPRO (HUMAN) PER 5 UNITS: Performed by: INTERNAL MEDICINE

## 2023-01-03 PROCEDURE — 94799 UNLISTED PULMONARY SVC/PX: CPT

## 2023-01-03 PROCEDURE — 94002 VENT MGMT INPAT INIT DAY: CPT

## 2023-01-03 PROCEDURE — 80053 COMPREHEN METABOLIC PANEL: CPT | Performed by: INTERNAL MEDICINE

## 2023-01-03 PROCEDURE — 92950 HEART/LUNG RESUSCITATION CPR: CPT

## 2023-01-03 PROCEDURE — 25010000002 PROPOFOL 10 MG/ML EMULSION: Performed by: NURSE ANESTHETIST, CERTIFIED REGISTERED

## 2023-01-03 PROCEDURE — 25010000002 EPINEPHRINE 1 MG/10ML SOLUTION PREFILLED SYRINGE: Performed by: INTERNAL MEDICINE

## 2023-01-03 PROCEDURE — 82962 GLUCOSE BLOOD TEST: CPT | Performed by: INTERNAL MEDICINE

## 2023-01-03 PROCEDURE — 0BH17EZ INSERTION OF ENDOTRACHEAL AIRWAY INTO TRACHEA, VIA NATURAL OR ARTIFICIAL OPENING: ICD-10-PCS | Performed by: STUDENT IN AN ORGANIZED HEALTH CARE EDUCATION/TRAINING PROGRAM

## 2023-01-03 PROCEDURE — 82962 GLUCOSE BLOOD TEST: CPT

## 2023-01-03 PROCEDURE — 25010000002 HEPARIN (PORCINE) PER 1000 UNITS: Performed by: INTERNAL MEDICINE

## 2023-01-03 PROCEDURE — 93010 ELECTROCARDIOGRAM REPORT: CPT | Performed by: INTERNAL MEDICINE

## 2023-01-03 PROCEDURE — 71045 X-RAY EXAM CHEST 1 VIEW: CPT

## 2023-01-03 PROCEDURE — 25010000002 EPINEPHRINE 1 MG/10ML SOLUTION PREFILLED SYRINGE: Performed by: NURSE ANESTHETIST, CERTIFIED REGISTERED

## 2023-01-03 PROCEDURE — 93306 TTE W/DOPPLER COMPLETE: CPT | Performed by: INTERNAL MEDICINE

## 2023-01-03 PROCEDURE — 99221 1ST HOSP IP/OBS SF/LOW 40: CPT | Performed by: PHYSICIAN ASSISTANT

## 2023-01-03 PROCEDURE — 93005 ELECTROCARDIOGRAM TRACING: CPT | Performed by: INTERNAL MEDICINE

## 2023-01-03 PROCEDURE — 93306 TTE W/DOPPLER COMPLETE: CPT

## 2023-01-03 PROCEDURE — 43239 EGD BIOPSY SINGLE/MULTIPLE: CPT | Performed by: INTERNAL MEDICINE

## 2023-01-03 PROCEDURE — 0DB68ZX EXCISION OF STOMACH, VIA NATURAL OR ARTIFICIAL OPENING ENDOSCOPIC, DIAGNOSTIC: ICD-10-PCS | Performed by: INTERNAL MEDICINE

## 2023-01-03 PROCEDURE — 5A1935Z RESPIRATORY VENTILATION, LESS THAN 24 CONSECUTIVE HOURS: ICD-10-PCS | Performed by: STUDENT IN AN ORGANIZED HEALTH CARE EDUCATION/TRAINING PROGRAM

## 2023-01-03 PROCEDURE — 85027 COMPLETE CBC AUTOMATED: CPT | Performed by: INTERNAL MEDICINE

## 2023-01-03 PROCEDURE — 0DB98ZX EXCISION OF DUODENUM, VIA NATURAL OR ARTIFICIAL OPENING ENDOSCOPIC, DIAGNOSTIC: ICD-10-PCS | Performed by: INTERNAL MEDICINE

## 2023-01-03 PROCEDURE — 25010000002 MORPHINE PER 10 MG: Performed by: INTERNAL MEDICINE

## 2023-01-03 RX ORDER — ONDANSETRON 2 MG/ML
4 INJECTION INTRAMUSCULAR; INTRAVENOUS EVERY 6 HOURS PRN
Status: DISCONTINUED | OUTPATIENT
Start: 2023-01-03 | End: 2023-01-20 | Stop reason: HOSPADM

## 2023-01-03 RX ORDER — MAGNESIUM SULFATE HEPTAHYDRATE 40 MG/ML
2 INJECTION, SOLUTION INTRAVENOUS AS NEEDED
Status: DISCONTINUED | OUTPATIENT
Start: 2023-01-03 | End: 2023-01-10

## 2023-01-03 RX ORDER — AMOXICILLIN 250 MG
1 CAPSULE ORAL NIGHTLY PRN
Status: DISCONTINUED | OUTPATIENT
Start: 2023-01-03 | End: 2023-01-03

## 2023-01-03 RX ORDER — IPRATROPIUM BROMIDE AND ALBUTEROL SULFATE 2.5; .5 MG/3ML; MG/3ML
3 SOLUTION RESPIRATORY (INHALATION) ONCE AS NEEDED
Status: DISCONTINUED | OUTPATIENT
Start: 2023-01-03 | End: 2023-01-03 | Stop reason: HOSPADM

## 2023-01-03 RX ORDER — BISACODYL 10 MG
10 SUPPOSITORY, RECTAL RECTAL DAILY PRN
Status: DISCONTINUED | OUTPATIENT
Start: 2023-01-03 | End: 2023-01-20 | Stop reason: HOSPADM

## 2023-01-03 RX ORDER — NICOTINE POLACRILEX 4 MG
15 LOZENGE BUCCAL
Status: DISCONTINUED | OUTPATIENT
Start: 2023-01-03 | End: 2023-01-20 | Stop reason: HOSPADM

## 2023-01-03 RX ORDER — PROPOFOL 10 MG/ML
VIAL (ML) INTRAVENOUS AS NEEDED
Status: DISCONTINUED | OUTPATIENT
Start: 2023-01-03 | End: 2023-01-03 | Stop reason: SURG

## 2023-01-03 RX ORDER — GABAPENTIN 100 MG/1
100 CAPSULE ORAL 3 TIMES DAILY
Status: DISCONTINUED | OUTPATIENT
Start: 2023-01-03 | End: 2023-01-18

## 2023-01-03 RX ORDER — FENTANYL/ROPIVACAINE/NS/PF 2-625MCG/1
15 PLASTIC BAG, INJECTION (ML) EPIDURAL AS NEEDED
Status: DISCONTINUED | OUTPATIENT
Start: 2023-01-03 | End: 2023-01-10

## 2023-01-03 RX ORDER — BUMETANIDE 0.25 MG/ML
1 INJECTION INTRAMUSCULAR; INTRAVENOUS ONCE
Status: COMPLETED | OUTPATIENT
Start: 2023-01-03 | End: 2023-01-03

## 2023-01-03 RX ORDER — LIDOCAINE HYDROCHLORIDE 10 MG/ML
INJECTION, SOLUTION EPIDURAL; INFILTRATION; INTRACAUDAL; PERINEURAL AS NEEDED
Status: DISCONTINUED | OUTPATIENT
Start: 2023-01-03 | End: 2023-01-03 | Stop reason: SURG

## 2023-01-03 RX ORDER — DEXTROSE MONOHYDRATE 25 G/50ML
25 INJECTION, SOLUTION INTRAVENOUS
Status: DISCONTINUED | OUTPATIENT
Start: 2023-01-03 | End: 2023-01-20 | Stop reason: HOSPADM

## 2023-01-03 RX ORDER — ACETAMINOPHEN 325 MG/1
650 TABLET ORAL EVERY 6 HOURS PRN
Status: DISCONTINUED | OUTPATIENT
Start: 2023-01-03 | End: 2023-01-06

## 2023-01-03 RX ORDER — MORPHINE SULFATE 2 MG/ML
2 INJECTION, SOLUTION INTRAMUSCULAR; INTRAVENOUS
Status: DISCONTINUED | OUTPATIENT
Start: 2023-01-03 | End: 2023-01-03

## 2023-01-03 RX ORDER — MAGNESIUM SULFATE HEPTAHYDRATE 40 MG/ML
4 INJECTION, SOLUTION INTRAVENOUS AS NEEDED
Status: DISCONTINUED | OUTPATIENT
Start: 2023-01-03 | End: 2023-01-10

## 2023-01-03 RX ORDER — OMEPRAZOLE 20 MG/1
20 CAPSULE, DELAYED RELEASE ORAL 2 TIMES DAILY
Status: DISCONTINUED | OUTPATIENT
Start: 2023-01-03 | End: 2023-01-20 | Stop reason: HOSPADM

## 2023-01-03 RX ORDER — AMOXICILLIN 250 MG
2 CAPSULE ORAL 2 TIMES DAILY
Status: DISCONTINUED | OUTPATIENT
Start: 2023-01-03 | End: 2023-01-20 | Stop reason: HOSPADM

## 2023-01-03 RX ORDER — POLYETHYLENE GLYCOL 3350 17 G/17G
17 POWDER, FOR SOLUTION ORAL DAILY PRN
Status: DISCONTINUED | OUTPATIENT
Start: 2023-01-03 | End: 2023-01-20 | Stop reason: HOSPADM

## 2023-01-03 RX ORDER — ONDANSETRON 2 MG/ML
4 INJECTION INTRAMUSCULAR; INTRAVENOUS ONCE AS NEEDED
Status: DISCONTINUED | OUTPATIENT
Start: 2023-01-03 | End: 2023-01-03 | Stop reason: HOSPADM

## 2023-01-03 RX ORDER — SODIUM CHLORIDE, SODIUM LACTATE, POTASSIUM CHLORIDE, CALCIUM CHLORIDE 600; 310; 30; 20 MG/100ML; MG/100ML; MG/100ML; MG/100ML
INJECTION, SOLUTION INTRAVENOUS CONTINUOUS PRN
Status: DISCONTINUED | OUTPATIENT
Start: 2023-01-03 | End: 2023-01-03 | Stop reason: SURG

## 2023-01-03 RX ORDER — CYCLOBENZAPRINE HCL 10 MG
5 TABLET ORAL 3 TIMES DAILY PRN
Status: DISCONTINUED | OUTPATIENT
Start: 2023-01-03 | End: 2023-01-17

## 2023-01-03 RX ORDER — IPRATROPIUM BROMIDE AND ALBUTEROL SULFATE 2.5; .5 MG/3ML; MG/3ML
3 SOLUTION RESPIRATORY (INHALATION) EVERY 6 HOURS PRN
Status: DISCONTINUED | OUTPATIENT
Start: 2023-01-03 | End: 2023-01-04

## 2023-01-03 RX ORDER — HEPARIN SODIUM 5000 [USP'U]/ML
5000 INJECTION, SOLUTION INTRAVENOUS; SUBCUTANEOUS EVERY 8 HOURS SCHEDULED
Status: DISCONTINUED | OUTPATIENT
Start: 2023-01-03 | End: 2023-01-06

## 2023-01-03 RX ORDER — SODIUM CHLORIDE 9 MG/ML
75 INJECTION, SOLUTION INTRAVENOUS ONCE
Status: COMPLETED | OUTPATIENT
Start: 2023-01-03 | End: 2023-01-03

## 2023-01-03 RX ORDER — SODIUM CHLORIDE 0.9 % (FLUSH) 0.9 %
10 SYRINGE (ML) INJECTION AS NEEDED
Status: DISCONTINUED | OUTPATIENT
Start: 2023-01-03 | End: 2023-01-03 | Stop reason: HOSPADM

## 2023-01-03 RX ORDER — BUMETANIDE 1 MG/1
2 TABLET ORAL DAILY
Status: DISCONTINUED | OUTPATIENT
Start: 2023-01-03 | End: 2023-01-03

## 2023-01-03 RX ORDER — EPINEPHRINE 0.1 MG/ML
SYRINGE (ML) INJECTION
Status: COMPLETED | OUTPATIENT
Start: 2023-01-03 | End: 2023-01-03

## 2023-01-03 RX ORDER — LORAZEPAM 2 MG/ML
1 INJECTION INTRAMUSCULAR EVERY 4 HOURS PRN
Status: DISCONTINUED | OUTPATIENT
Start: 2023-01-03 | End: 2023-01-03

## 2023-01-03 RX ORDER — ATORVASTATIN CALCIUM 40 MG/1
40 TABLET, FILM COATED ORAL DAILY
Qty: 90 TABLET | Refills: 0 | OUTPATIENT
Start: 2023-01-03

## 2023-01-03 RX ORDER — ATROPINE SULFATE 0.4 MG/ML
AMPUL (ML) INJECTION AS NEEDED
Status: DISCONTINUED | OUTPATIENT
Start: 2023-01-03 | End: 2023-01-03 | Stop reason: SURG

## 2023-01-03 RX ORDER — ATORVASTATIN CALCIUM 40 MG/1
40 TABLET, FILM COATED ORAL DAILY
Status: DISCONTINUED | OUTPATIENT
Start: 2023-01-03 | End: 2023-01-20 | Stop reason: HOSPADM

## 2023-01-03 RX ORDER — GLYCOPYRROLATE 0.2 MG/ML
INJECTION INTRAMUSCULAR; INTRAVENOUS AS NEEDED
Status: DISCONTINUED | OUTPATIENT
Start: 2023-01-03 | End: 2023-01-03 | Stop reason: SURG

## 2023-01-03 RX ORDER — SODIUM CHLORIDE 0.9 % (FLUSH) 0.9 %
10 SYRINGE (ML) INJECTION EVERY 12 HOURS SCHEDULED
Status: DISCONTINUED | OUTPATIENT
Start: 2023-01-03 | End: 2023-01-03 | Stop reason: HOSPADM

## 2023-01-03 RX ORDER — INSULIN LISPRO 100 [IU]/ML
0-7 INJECTION, SOLUTION INTRAVENOUS; SUBCUTANEOUS
Status: DISCONTINUED | OUTPATIENT
Start: 2023-01-03 | End: 2023-01-20 | Stop reason: HOSPADM

## 2023-01-03 RX ORDER — CLOPIDOGREL BISULFATE 75 MG/1
75 TABLET ORAL DAILY
Status: DISCONTINUED | OUTPATIENT
Start: 2023-01-03 | End: 2023-01-20 | Stop reason: HOSPADM

## 2023-01-03 RX ORDER — POTASSIUM CHLORIDE 7.45 MG/ML
10 INJECTION INTRAVENOUS
Status: DISCONTINUED | OUTPATIENT
Start: 2023-01-03 | End: 2023-01-10

## 2023-01-03 RX ORDER — EPINEPHRINE 0.1 MG/ML
SYRINGE (ML) INJECTION AS NEEDED
Status: DISCONTINUED | OUTPATIENT
Start: 2023-01-03 | End: 2023-01-03 | Stop reason: SURG

## 2023-01-03 RX ORDER — BISACODYL 5 MG/1
5 TABLET, DELAYED RELEASE ORAL DAILY PRN
Status: DISCONTINUED | OUTPATIENT
Start: 2023-01-03 | End: 2023-01-20 | Stop reason: HOSPADM

## 2023-01-03 RX ADMIN — EPINEPHRINE 1 MG: 0.1 INJECTION, SOLUTION ENDOTRACHEAL; INTRACARDIAC; INTRAVENOUS at 10:27

## 2023-01-03 RX ADMIN — SODIUM CHLORIDE, POTASSIUM CHLORIDE, SODIUM LACTATE AND CALCIUM CHLORIDE: 600; 310; 30; 20 INJECTION, SOLUTION INTRAVENOUS at 10:11

## 2023-01-03 RX ADMIN — LIDOCAINE HYDROCHLORIDE 100 MG: 10 INJECTION, SOLUTION EPIDURAL; INFILTRATION; INTRACAUDAL; PERINEURAL at 10:15

## 2023-01-03 RX ADMIN — ATROPINE SULFATE 0.4 MG: 0.4 INJECTION, SOLUTION INTRAMUSCULAR; INTRAVENOUS; SUBCUTANEOUS at 10:18

## 2023-01-03 RX ADMIN — GLYCOPYRROLATE 0.4 MCG: 0.4 INJECTION INTRAMUSCULAR; INTRAVENOUS at 10:15

## 2023-01-03 RX ADMIN — PROPOFOL 130 MG: 10 INJECTION, EMULSION INTRAVENOUS at 10:15

## 2023-01-03 RX ADMIN — PROPOFOL 30 MG: 10 INJECTION, EMULSION INTRAVENOUS at 10:18

## 2023-01-03 RX ADMIN — EPINEPHRINE 1 MG: 0.1 INJECTION INTRACARDIAC; INTRAVENOUS at 10:23

## 2023-01-03 RX ADMIN — Medication 10 ML: at 09:28

## 2023-01-03 RX ADMIN — MORPHINE SULFATE 2 MG: 2 INJECTION, SOLUTION INTRAMUSCULAR; INTRAVENOUS at 14:55

## 2023-01-03 RX ADMIN — MORPHINE SULFATE 2 MG: 2 INJECTION, SOLUTION INTRAMUSCULAR; INTRAVENOUS at 13:28

## 2023-01-03 RX ADMIN — SODIUM CHLORIDE 75 ML/HR: 9 INJECTION, SOLUTION INTRAVENOUS at 09:28

## 2023-01-03 RX ADMIN — INSULIN LISPRO 3 UNITS: 100 INJECTION, SOLUTION INTRAVENOUS; SUBCUTANEOUS at 18:34

## 2023-01-03 RX ADMIN — BUMETANIDE 1 MG: 0.25 INJECTION, SOLUTION INTRAMUSCULAR; INTRAVENOUS at 17:58

## 2023-01-03 RX ADMIN — MORPHINE SULFATE 4 MG: 4 INJECTION, SOLUTION INTRAMUSCULAR; INTRAVENOUS at 19:53

## 2023-01-03 RX ADMIN — PROPOFOL 50 MCG/KG/MIN: 10 INJECTION, EMULSION INTRAVENOUS at 11:00

## 2023-01-03 RX ADMIN — MORPHINE SULFATE 2 MG: 2 INJECTION, SOLUTION INTRAMUSCULAR; INTRAVENOUS at 12:22

## 2023-01-03 RX ADMIN — HEPARIN SODIUM 5000 UNITS: 5000 INJECTION INTRAVENOUS; SUBCUTANEOUS at 22:24

## 2023-01-03 RX ADMIN — EPINEPHRINE 1 MG: 0.1 INJECTION, SOLUTION ENDOTRACHEAL; INTRACARDIAC; INTRAVENOUS at 10:19

## 2023-01-03 NOTE — ANESTHESIA POSTPROCEDURE EVALUATION
Patient: Susan Anderson    Procedure Summary     Date: 01/03/23 Room / Location:  [facility] /  [facility]    Anesthesia Start: 1011 Anesthesia Stop: 1115    Procedure: ESOPHAGOGASTRODUODENOSCOPY Diagnosis:       Ulcer of esophagus without bleeding      (Ulcer of esophagus without bleeding [K22.10])    Surgeons: Tommy Valencia MD Provider: David Almanza MD    Anesthesia Type: general ASA Status: 3          Anesthesia Type: general    Vitals  Vitals Value Taken Time   BP     Temp     Pulse 68 01/03/23 1121   Resp     SpO2 94 % 01/03/23 1121   Vitals shown include unvalidated device data.        Post Anesthesia Care and Evaluation    Patient location during evaluation: ICU  Patient participation: complete - patient cannot participate  Level of consciousness: lethargic  Pain management: adequate    Airway patency: patent  Anesthetic complications: No anesthetic complications  PONV Status: none  Cardiovascular status: hemodynamically stable and acceptable  Respiratory status: acceptable, ETT and intubated  Hydration status: acceptable

## 2023-01-03 NOTE — THERAPY PROGRESS REPORT/RE-CERT
Outpatient Rehabilitation - Wound/Debridement Progress Note   Ed[facility]     Patient Name: Susan Anderson  : 1940  MRN: [id number]  Today's Date: 2022                 Admit Date: 2022    Visit Dx:    ICD-10-CM ICD-9-CM   1. Open wound of left foot, subsequent encounter  S91.302D V58.89     892.0       Patient Active Problem List   Diagnosis   • Diabetic foot ulcer ([facility])   • PVD (peripheral vascular disease) ([facility])   • Osteomyelitis ([facility])   • Diabetes mellitus with neuropathy ([facility])   • Essential hypertension   • Stage 3a chronic kidney disease ([facility])   • Pyogenic inflammation of bone ([facility])   • Hyperglycemia   • Pneumonia due to infectious organism   • Mitral valve disease   • NICM (nonischemic cardiomyopathy) ([facility])   • Coronary artery disease involving native coronary artery of native heart without angina pectoris   • Dyslipidemia   • Chronic combined systolic and diastolic heart failure ([facility])   • Lumbar stenosis with neurogenic claudication   • Spondylosis of lumbar region without myelopathy or radiculopathy   • Spondylolisthesis, lumbar region   • Degeneration of lumbar or lumbosacral intervertebral disc   • Gait disturbance   • Physical deconditioning   • Sarcopenia   • Weakness   • Anemia, chronic disease   • Fall   • Dizziness   • Demand ischemia ([facility])   • Effusion of right knee   • Right knee pain   • Pressure injury of skin of sacral region   • Sleep apnea   • GIB (gastrointestinal bleeding)   • Vasovagal syncope   • Hypomagnesemia   • Syncope, unspecified syncope type   • Paroxysmal atrial fibrillation ([facility])   • Ulcer of esophagus without bleeding        Past Medical History:   Diagnosis Date   • Arthritis    • Asthma / - double pneumonia    Currently on inhaler and nebulizer   • Atrial fibrillation ([facility]) 2022   • Cancer ([facility])     cervical cancer, skin cancer   • CHF (congestive heart failure) ([facility]) 2021   • Chronic kidney disease Related to diabetes   • Coronary artery  normal mood with appropriate affect disease 6/4/2021 DX for hear failure   • Diabetes mellitus (HCC) 30 years    Seeing Dr. Lam 1st time Aug 19   • Gout    • Hx of colonoscopy    • Hyperlipidemia Reference current labs x 2-3yrs approx   • Hypertension 30 years   • Migraine    • Mitral valve disease    • Mitral valve disease    • Osteomyelitis (HCC)    • Peripheral neuropathy    • Sleep apnea    • Type 2 diabetes mellitus (HCC)     30 years        Past Surgical History:   Procedure Laterality Date   • ABDOMINAL HYSTERECTOMY W/SALPINGECTOMY     • AMPUTATION  Right great toe, 1st 1/3 metatarsal -[place] 4/14/21   • AORTAGRAM N/A 4/9/2021    Procedure: AORTAGRAM WITH OR WITHOUT RUNOFFS, WITH Co2;  Surgeon: Trey Forrest MD;  Location:  [facility];  Service: Vascular;  Laterality: N/A;   • AORTAGRAM N/A 9/28/2022    Procedure: CO2 ANGIOGRAM, LEFT SFA ANGIOPLASTY WITH DRUG ELUTING BALLOON, LEFT SFA STENT PLACEMENT ;  Surgeon: Trey Forrest MD;  Location:  [facility];  Service: Vascular;  Laterality: N/A;  FLUORO: 13.12  DOSE 162mGy  CONTRAST: Isovue 350: 15ml   • APPENDECTOMY     • BRAIN TUMOR EXCISION      laser surgery    • CARDIAC CATHETERIZATION N/A 6/21/2021    Procedure: LEFT HEART CATH;  Surgeon: Anjum Ceballos MD;  Location:  [facility] CATH INVASIVE LOCATION;  Service: Cardiology;  Laterality: N/A;   • CATARACT EXTRACTION W/ INTRAOCULAR LENS  IMPLANT, BILATERAL     • CHOLECYSTECTOMY     • ENDOSCOPY N/A 10/7/2022    Procedure: ESOPHAGOGASTRODUODENOSCOPY;  Surgeon: Stef Veras MD;  Location:  [facility];  Service: Gastroenterology;  Laterality: N/A;   • EYE SURGERY     • HYSTERECTOMY     • TOE SURGERY     • TRANS METATARSAL AMPUTATION Right 4/14/2021    Procedure: AMPUTATION TRANS METATARSAL RIGHT GREAT TOE;  Surgeon: Valdez Finney MD;  Location:  [facility] OR;  Service: Vascular;  Laterality: Right;         EVALUATION   PT Ortho     Row Name 11/17/22 6820       Subjective Comments    Subjective Comments Pt without complaints.   "Daughter reports pt is going to see dermatology for generalized rash.  -       Subjective Pain    Able to rate subjective pain? yes  -    Pre-Treatment Pain Level 0  -JM    Post-Treatment Pain Level 0  -JM    Subjective Pain Comment DPN  -JM       Transfers    Comment, (Transfers) remained seated in w/c for tx  -JM          User Key  (r) = Recorded By, (t) = Taken By, (c) = Cosigned By    Initials Name Provider Type    Shi Mane PT Physical Therapist                 [facility] Wound     Row Name 11/17/22 1515             Wound 10/08/22 1400 Left anterior foot Arterial Ulcer    Wound - Properties Group Placement Date: 10/08/22  - Placement Time: 1400  -MF Side: Left  -MF Orientation: anterior  -MF Location: foot  -MF Primary Wound Type: Arterial ulc  -MF    Wound Image Images linked: 1  -JM      Dressing Appearance intact;moist drainage  -      Base moist;pink;red;yellow;slough;epithelialization;granulating  increased granulation, new epithelial growth at proximal and distal edges  -      Periwound intact;dry;redness;warm  -      Periwound Temperature warm  -      Periwound Skin Turgor soft  -      Edges open  -      Drainage Characteristics/Odor serosanguineous  -      Drainage Amount small  -      Care, Wound cleansed with;wound cleanser;debrided  -      Dressing Care dressing applied;collagen;antimicrobial agent applied;foam;border dressing  carolee, saline-moist HFBc, 4\" optifoam  -      Periwound Care cleansed with pH balanced cleanser;dry periwound area maintained  -      Retired Wound - Properties Group Placement Date: 10/08/22  - Placement Time: 1400  -MF Side: Left  -MF Orientation: anterior  -MF Location: foot  -MF Primary Wound Type: Arterial ulc  -MF    Retired Wound - Properties Group Date first assessed: 10/08/22  - Time first assessed: 1400  -MF Side: Left  -MF Location: foot  -MF Primary Wound Type: Arterial ulc  -MF          User Key  (r) = Recorded By, (t) = " Taken By, (c) = Cosigned By    Initials Name Provider Type    Avtar Small, PT Physical Therapist    Shi Mane, SAHIL Physical Therapist                  WOUND DEBRIDEMENT  Total area of Debridement: 1cmsq  Debridement Site 1  Location- Site 1: L foot wound  Selective Debridement- Site 1: Wound Surface <20cmsq  Instruments- Site 1: tweezers  Excised Tissue Description- Site 1: minimum, slough, other (comment) (biofilm, dried exudate/crust)  Bleeding- Site 1: none              Therapy Education     Row Name 11/17/22 1516             Therapy Education    Education Details Continue with dressing changes, will likely be ready for trial of home management next tx.  -      Given Symptoms/condition management;Bandaging/dressing change  -      Program Reinforced  -VERONICA      How Provided Verbal;Demonstration  -VERONICA      Provided to Patient;Caregiver  daughter  -      Level of Understanding Verbalized;Teach back education performed  -            User Key  (r) = Recorded By, (t) = Taken By, (c) = Cosigned By    Initials Name Provider Type    Sih Mane, PT Physical Therapist                Recommendation and Plan   PT Assessment/Plan     Row Name 11/17/22 2486          PT Assessment    Functional Limitations Performance in self-care ADL;Impaired gait;Other (comment)  wound management limitations  -     Impairments Impaired arterial circulation;Integumentary integrity;Pain;Impaired sensory integrity  -     Assessment Comments Pt with increased granulation of L foot wound, lateral wound depth decreasing approximately 0.2cm estimated depth today.  No S&S of infection, family performing home dressings independently.  Pt will continue to benefit from wound debridement and dressings management.  May be appropriate to reduce to every other week tx or trial home management next tx.  Continue with current goals and POC.  -     Rehab Potential Good  -     Patient/caregiver participated in  establishment of treatment plan and goals Yes  -     Patient would benefit from skilled therapy intervention Yes  -        PT Plan    PT Frequency 1x/week  -     Predicted Duration of Therapy Intervention (PT) 4-6 visits  -     Planned CPT's? PT PIPE DEBRIDE OPEN WOUND UP TO 20 CM: 47595;PT NONSELECT DEBRIDE 15 MIN: 46836;PT SELF CARE/MGMT/TRAIN 15 MIN: 68420  -     Physical Therapy Interventions (Optional Details) patient/family education;wound care  -     PT Plan Comments debridement, dressings  -           User Key  (r) = Recorded By, (t) = Taken By, (c) = Cosigned By    Initials Name Provider Type    Shi Mane, PT Physical Therapist                Goals   PT OP Goals     Row Name 11/17/22 1515          PT Short Term Goals    STG 1 Reduce wound dimensions by 25% as evidence of wound healing.  -     STG 1 Progress Ongoing;Progressing  -     STG 2 Increase granulation formation to 25% of wound bed or greater, to promote wound closure.  -     STG 2 Progress Met  -        Long Term Goals    LTG 1 Pt/family independent with home dressing changes.  -     LTG 1 Progress Met  -     LTG 2 L foot wound to be closed/resurfaced to allow for full indepenent function.  -     LTG 2 Progress Ongoing  -        Time Calculation    PT Goal Re-Cert Due Date 01/01/23  -           User Key  (r) = Recorded By, (t) = Taken By, (c) = Cosigned By    Initials Name Provider Type    Shi Mane, PT Physical Therapist                PT Goal Re-Cert Due Date: 01/01/23  PT Short Term Goals  STG 1: Reduce wound dimensions by 25% as evidence of wound healing.  STG 1 Progress: Ongoing, Progressing  STG 2: Increase granulation formation to 25% of wound bed or greater, to promote wound closure.  STG 2 Progress: Met  Long Term Goals  LTG 1: Pt/family independent with home dressing changes.  LTG 1 Progress: Met  LTG 2: L foot wound to be closed/resurfaced to allow for full indepenent function.  LTG  2 Progress: Ongoing      Time Calculation: Start Time: 1515  Untimed Charges  22432-Lgssvspky debridement: 15  Total Minutes  Untimed Charges Total Minutes: 15   Total Minutes: 15  Therapy Charges for Today     Code Description Service Date Service Provider Modifiers Qty    [id number] HC PIPE DEBRIDE OPEN WOUND UP TO 20CM 11/17/2022 Shi Cordoba, PT GP 1                  Shi Cordoba, PT  11/17/2022

## 2023-01-03 NOTE — ANESTHESIA PREPROCEDURE EVALUATION
Anesthesia Evaluation     Patient summary reviewed and Nursing notes reviewed   no history of anesthetic complications:  NPO Solid Status: > 8 hours  NPO Liquid Status: > 2 hours           Airway   Mallampati: I  TM distance: >3 FB  Neck ROM: full  No difficulty expected  Dental    (+) poor dentition    Pulmonary    (+) pneumonia , asthma,sleep apnea, decreased breath sounds,   Cardiovascular   Exercise tolerance: poor (<4 METS)    ECG reviewed  PT is on anticoagulation therapy  Rhythm: regular  Rate: normal    (+) hypertension, CAD, dysrhythmias Atrial Fib, PVD, hyperlipidemia,   (-) CHF    ROS comment: Interpretation Summary    ? Estimated left ventricular EF = 55%  ? Mild to moderate mitral valve regurgitation is present.         Neuro/Psych  (+) headaches, dizziness/light headedness, syncope, weakness, numbness, psychiatric history Anxiety,    GI/Hepatic/Renal/Endo    (+)  GERD well controlled, PUD, GI bleeding resolved, renal disease CRI, diabetes mellitus,     Musculoskeletal     Abdominal   (+) obese,     Abdomen: soft.   Substance History      OB/GYN          Other   arthritis,    history of cancer remission                    Anesthesia Plan    ASA 3     general     intravenous induction     Anesthetic plan, risks, benefits, and alternatives have been provided, discussed and informed consent has been obtained with: patient.    Plan discussed with CRNA.        CODE STATUS:

## 2023-01-04 ENCOUNTER — ANCILLARY PROCEDURE (OUTPATIENT)
Dept: SPEECH THERAPY | Facility: HOSPITAL | Age: 83
DRG: 917 | End: 2023-01-04
Payer: MEDICARE

## 2023-01-04 ENCOUNTER — APPOINTMENT (OUTPATIENT)
Dept: GENERAL RADIOLOGY | Facility: HOSPITAL | Age: 83
DRG: 917 | End: 2023-01-04
Payer: MEDICARE

## 2023-01-04 PROBLEM — I46.9 CARDIAC ARREST: Status: ACTIVE | Noted: 2023-01-04

## 2023-01-04 LAB
ALBUMIN SERPL-MCNC: 2.4 G/DL (ref 3.5–5.2)
ALBUMIN/GLOB SERPL: 0.8 G/DL
ALP SERPL-CCNC: 261 U/L (ref 39–117)
ALT SERPL W P-5'-P-CCNC: 51 U/L (ref 1–33)
ANION GAP SERPL CALCULATED.3IONS-SCNC: 13 MMOL/L (ref 5–15)
AST SERPL-CCNC: 63 U/L (ref 1–32)
BASOPHILS # BLD AUTO: 0.03 10*3/MM3 (ref 0–0.2)
BASOPHILS NFR BLD AUTO: 0.6 % (ref 0–1.5)
BH CV ECHO MEAS - AO MAX PG: 5.4 MMHG
BH CV ECHO MEAS - AO MEAN PG: 3 MMHG
BH CV ECHO MEAS - AO ROOT DIAM: 3.1 CM
BH CV ECHO MEAS - AO V2 MAX: 116 CM/SEC
BH CV ECHO MEAS - AO V2 VTI: 28 CM
BH CV ECHO MEAS - AVA(I,D): 1.47 CM2
BH CV ECHO MEAS - EDV(CUBED): 157.5 ML
BH CV ECHO MEAS - EDV(MOD-SP2): 102 ML
BH CV ECHO MEAS - EDV(MOD-SP4): 104 ML
BH CV ECHO MEAS - EF(MOD-BP): 47.2 %
BH CV ECHO MEAS - EF(MOD-SP2): 44.7 %
BH CV ECHO MEAS - EF(MOD-SP4): 50.4 %
BH CV ECHO MEAS - ESV(CUBED): 59.3 ML
BH CV ECHO MEAS - ESV(MOD-SP2): 56.4 ML
BH CV ECHO MEAS - ESV(MOD-SP4): 51.6 ML
BH CV ECHO MEAS - FS: 27.8 %
BH CV ECHO MEAS - IVS/LVPW: 1.09 CM
BH CV ECHO MEAS - IVSD: 1.2 CM
BH CV ECHO MEAS - LA DIMENSION: 3.6 CM
BH CV ECHO MEAS - LAT PEAK E' VEL: 8.7 CM/SEC
BH CV ECHO MEAS - LV MASS(C)D: 249.4 GRAMS
BH CV ECHO MEAS - LV MAX PG: 1.55 MMHG
BH CV ECHO MEAS - LV MEAN PG: 1 MMHG
BH CV ECHO MEAS - LV V1 MAX: 62.3 CM/SEC
BH CV ECHO MEAS - LV V1 VTI: 14.5 CM
BH CV ECHO MEAS - LVIDD: 5.4 CM
BH CV ECHO MEAS - LVIDS: 3.9 CM
BH CV ECHO MEAS - LVOT AREA: 2.8 CM2
BH CV ECHO MEAS - LVOT DIAM: 1.9 CM
BH CV ECHO MEAS - LVPWD: 1.1 CM
BH CV ECHO MEAS - MED PEAK E' VEL: 4.2 CM/SEC
BH CV ECHO MEAS - MR MAX PG: 46.5 MMHG
BH CV ECHO MEAS - MR MAX VEL: 341 CM/SEC
BH CV ECHO MEAS - MR MEAN PG: 34 MMHG
BH CV ECHO MEAS - MR MEAN VEL: 281 CM/SEC
BH CV ECHO MEAS - MR VTI: 119 CM
BH CV ECHO MEAS - MV A MAX VEL: 106 CM/SEC
BH CV ECHO MEAS - MV DEC SLOPE: 316 CM/SEC2
BH CV ECHO MEAS - MV DEC TIME: 0.25 MSEC
BH CV ECHO MEAS - MV E MAX VEL: 61.3 CM/SEC
BH CV ECHO MEAS - MV E/A: 0.58
BH CV ECHO MEAS - MV P1/2T: 94.5 MSEC
BH CV ECHO MEAS - MVA(P1/2T): 2.33 CM2
BH CV ECHO MEAS - PA ACC TIME: 0.09 SEC
BH CV ECHO MEAS - PA PR(ACCEL): 37.6 MMHG
BH CV ECHO MEAS - SV(LVOT): 41.1 ML
BH CV ECHO MEAS - SV(MOD-SP2): 45.6 ML
BH CV ECHO MEAS - SV(MOD-SP4): 52.4 ML
BH CV ECHO MEASUREMENTS AVERAGE E/E' RATIO: 9.5
BH CV VAS BP RIGHT ARM: NORMAL MMHG
BH CV XLRA - RV BASE: 3.8 CM
BH CV XLRA - RV LENGTH: 6.7 CM
BH CV XLRA - RV MID: 2.5 CM
BH CV XLRA - TDI S': 4.4 CM/SEC
BILIRUB SERPL-MCNC: 0.8 MG/DL (ref 0–1.2)
BUN SERPL-MCNC: 66 MG/DL (ref 8–23)
BUN/CREAT SERPL: 47.8 (ref 7–25)
CALCIUM SPEC-SCNC: 8.4 MG/DL (ref 8.6–10.5)
CHLORIDE SERPL-SCNC: 100 MMOL/L (ref 98–107)
CO2 SERPL-SCNC: 27 MMOL/L (ref 22–29)
CREAT SERPL-MCNC: 1.38 MG/DL (ref 0.57–1)
CYTO UR: NORMAL
DEPRECATED RDW RBC AUTO: 55.8 FL (ref 37–54)
EGFRCR SERPLBLD CKD-EPI 2021: 38.1 ML/MIN/1.73
EOSINOPHIL # BLD AUTO: 0.07 10*3/MM3 (ref 0–0.4)
EOSINOPHIL NFR BLD AUTO: 1.3 % (ref 0.3–6.2)
ERYTHROCYTE [DISTWIDTH] IN BLOOD BY AUTOMATED COUNT: 16.5 % (ref 12.3–15.4)
GLOBULIN UR ELPH-MCNC: 3.2 GM/DL
GLUCOSE BLDC GLUCOMTR-MCNC: 101 MG/DL (ref 70–130)
GLUCOSE BLDC GLUCOMTR-MCNC: 128 MG/DL (ref 70–130)
GLUCOSE BLDC GLUCOMTR-MCNC: 192 MG/DL (ref 70–130)
GLUCOSE BLDC GLUCOMTR-MCNC: 233 MG/DL (ref 70–130)
GLUCOSE SERPL-MCNC: 104 MG/DL (ref 65–99)
HCT VFR BLD AUTO: 32.7 % (ref 34–46.6)
HGB BLD-MCNC: 9.7 G/DL (ref 12–15.9)
IMM GRANULOCYTES # BLD AUTO: 0.02 10*3/MM3 (ref 0–0.05)
IMM GRANULOCYTES NFR BLD AUTO: 0.4 % (ref 0–0.5)
IVRT: 123 MSEC
LAB AP CASE REPORT: NORMAL
LAB AP CLINICAL INFORMATION: NORMAL
LEFT ATRIUM VOLUME INDEX: 31.3 ML/M2
LEFT ATRIUM VOLUME: 58.5 ML
LYMPHOCYTES # BLD AUTO: 0.89 10*3/MM3 (ref 0.7–3.1)
LYMPHOCYTES NFR BLD AUTO: 16.6 % (ref 19.6–45.3)
MAGNESIUM SERPL-MCNC: 2.6 MG/DL (ref 1.6–2.4)
MAXIMAL PREDICTED HEART RATE: 137 BPM
MCH RBC QN AUTO: 27.3 PG (ref 26.6–33)
MCHC RBC AUTO-ENTMCNC: 29.7 G/DL (ref 31.5–35.7)
MCV RBC AUTO: 92.1 FL (ref 79–97)
MONOCYTES # BLD AUTO: 0.83 10*3/MM3 (ref 0.1–0.9)
MONOCYTES NFR BLD AUTO: 15.5 % (ref 5–12)
NEUTROPHILS NFR BLD AUTO: 3.51 10*3/MM3 (ref 1.7–7)
NEUTROPHILS NFR BLD AUTO: 65.6 % (ref 42.7–76)
NRBC BLD AUTO-RTO: 0 /100 WBC (ref 0–0.2)
PATH REPORT.FINAL DX SPEC: NORMAL
PATH REPORT.GROSS SPEC: NORMAL
PHOSPHATE SERPL-MCNC: 5.9 MG/DL (ref 2.5–4.5)
PLATELET # BLD AUTO: 212 10*3/MM3 (ref 140–450)
PMV BLD AUTO: 10.5 FL (ref 6–12)
POTASSIUM SERPL-SCNC: 3.9 MMOL/L (ref 3.5–5.2)
PROT SERPL-MCNC: 5.6 G/DL (ref 6–8.5)
QT INTERVAL: 434 MS
QTC INTERVAL: 478 MS
RBC # BLD AUTO: 3.55 10*6/MM3 (ref 3.77–5.28)
SODIUM SERPL-SCNC: 140 MMOL/L (ref 136–145)
STRESS TARGET HR: 116 BPM
WBC NRBC COR # BLD: 5.35 10*3/MM3 (ref 3.4–10.8)

## 2023-01-04 PROCEDURE — 99232 SBSQ HOSP IP/OBS MODERATE 35: CPT | Performed by: INTERNAL MEDICINE

## 2023-01-04 PROCEDURE — 84100 ASSAY OF PHOSPHORUS: CPT | Performed by: INTERNAL MEDICINE

## 2023-01-04 PROCEDURE — 63710000001 INSULIN LISPRO (HUMAN) PER 5 UNITS: Performed by: INTERNAL MEDICINE

## 2023-01-04 PROCEDURE — 83735 ASSAY OF MAGNESIUM: CPT | Performed by: INTERNAL MEDICINE

## 2023-01-04 PROCEDURE — 94761 N-INVAS EAR/PLS OXIMETRY MLT: CPT

## 2023-01-04 PROCEDURE — 94664 DEMO&/EVAL PT USE INHALER: CPT

## 2023-01-04 PROCEDURE — 85025 COMPLETE CBC W/AUTO DIFF WBC: CPT | Performed by: INTERNAL MEDICINE

## 2023-01-04 PROCEDURE — 80053 COMPREHEN METABOLIC PANEL: CPT | Performed by: INTERNAL MEDICINE

## 2023-01-04 PROCEDURE — 25010000002 MORPHINE PER 10 MG: Performed by: INTERNAL MEDICINE

## 2023-01-04 PROCEDURE — 82962 GLUCOSE BLOOD TEST: CPT

## 2023-01-04 PROCEDURE — 25010000002 HEPARIN (PORCINE) PER 1000 UNITS: Performed by: INTERNAL MEDICINE

## 2023-01-04 PROCEDURE — 99233 SBSQ HOSP IP/OBS HIGH 50: CPT | Performed by: INTERNAL MEDICINE

## 2023-01-04 PROCEDURE — 92612 ENDOSCOPY SWALLOW (FEES) VID: CPT | Performed by: SPEECH-LANGUAGE PATHOLOGIST

## 2023-01-04 PROCEDURE — 94640 AIRWAY INHALATION TREATMENT: CPT

## 2023-01-04 PROCEDURE — 94799 UNLISTED PULMONARY SVC/PX: CPT

## 2023-01-04 PROCEDURE — 92610 EVALUATE SWALLOWING FUNCTION: CPT | Performed by: SPEECH-LANGUAGE PATHOLOGIST

## 2023-01-04 PROCEDURE — 71045 X-RAY EXAM CHEST 1 VIEW: CPT

## 2023-01-04 RX ORDER — POTASSIUM CHLORIDE 750 MG/1
20 CAPSULE, EXTENDED RELEASE ORAL ONCE
Status: COMPLETED | OUTPATIENT
Start: 2023-01-04 | End: 2023-01-04

## 2023-01-04 RX ORDER — IPRATROPIUM BROMIDE AND ALBUTEROL SULFATE 2.5; .5 MG/3ML; MG/3ML
3 SOLUTION RESPIRATORY (INHALATION)
Status: DISCONTINUED | OUTPATIENT
Start: 2023-01-04 | End: 2023-01-05

## 2023-01-04 RX ORDER — BUMETANIDE 0.25 MG/ML
1 INJECTION INTRAMUSCULAR; INTRAVENOUS ONCE
Status: COMPLETED | OUTPATIENT
Start: 2023-01-04 | End: 2023-01-04

## 2023-01-04 RX ADMIN — IPRATROPIUM BROMIDE AND ALBUTEROL SULFATE 3 ML: 2.5; .5 SOLUTION RESPIRATORY (INHALATION) at 12:20

## 2023-01-04 RX ADMIN — ATORVASTATIN CALCIUM 40 MG: 40 TABLET, FILM COATED ORAL at 09:19

## 2023-01-04 RX ADMIN — GABAPENTIN 100 MG: 100 CAPSULE ORAL at 09:19

## 2023-01-04 RX ADMIN — CLOPIDOGREL BISULFATE 75 MG: 75 TABLET ORAL at 09:19

## 2023-01-04 RX ADMIN — HEPARIN SODIUM 5000 UNITS: 5000 INJECTION INTRAVENOUS; SUBCUTANEOUS at 21:03

## 2023-01-04 RX ADMIN — INSULIN LISPRO 3 UNITS: 100 INJECTION, SOLUTION INTRAVENOUS; SUBCUTANEOUS at 21:03

## 2023-01-04 RX ADMIN — SENNOSIDES AND DOCUSATE SODIUM 2 TABLET: 50; 8.6 TABLET ORAL at 09:19

## 2023-01-04 RX ADMIN — HEPARIN SODIUM 5000 UNITS: 5000 INJECTION INTRAVENOUS; SUBCUTANEOUS at 05:51

## 2023-01-04 RX ADMIN — INSULIN LISPRO 2 UNITS: 100 INJECTION, SOLUTION INTRAVENOUS; SUBCUTANEOUS at 17:08

## 2023-01-04 RX ADMIN — POTASSIUM CHLORIDE 20 MEQ: 750 CAPSULE, EXTENDED RELEASE ORAL at 12:17

## 2023-01-04 RX ADMIN — MORPHINE SULFATE 4 MG: 4 INJECTION, SOLUTION INTRAMUSCULAR; INTRAVENOUS at 11:22

## 2023-01-04 RX ADMIN — SENNOSIDES AND DOCUSATE SODIUM 2 TABLET: 50; 8.6 TABLET ORAL at 21:03

## 2023-01-04 RX ADMIN — BUMETANIDE 1 MG: 0.25 INJECTION, SOLUTION INTRAMUSCULAR; INTRAVENOUS at 15:01

## 2023-01-04 RX ADMIN — OMEPRAZOLE 20 MG: 20 CAPSULE, DELAYED RELEASE ORAL at 09:19

## 2023-01-04 RX ADMIN — HEPARIN SODIUM 5000 UNITS: 5000 INJECTION INTRAVENOUS; SUBCUTANEOUS at 13:43

## 2023-01-04 RX ADMIN — BUMETANIDE 1 MG: 0.25 INJECTION, SOLUTION INTRAMUSCULAR; INTRAVENOUS at 09:41

## 2023-01-04 RX ADMIN — GABAPENTIN 100 MG: 100 CAPSULE ORAL at 15:01

## 2023-01-04 RX ADMIN — IPRATROPIUM BROMIDE AND ALBUTEROL SULFATE 3 ML: 2.5; .5 SOLUTION RESPIRATORY (INHALATION) at 19:35

## 2023-01-04 RX ADMIN — GABAPENTIN 100 MG: 100 CAPSULE ORAL at 21:02

## 2023-01-05 ENCOUNTER — APPOINTMENT (OUTPATIENT)
Dept: GENERAL RADIOLOGY | Facility: HOSPITAL | Age: 83
DRG: 917 | End: 2023-01-05
Payer: MEDICARE

## 2023-01-05 ENCOUNTER — DOCUMENTATION (OUTPATIENT)
Dept: PHYSICAL THERAPY | Facility: CLINIC | Age: 83
End: 2023-01-05
Payer: MEDICARE

## 2023-01-05 LAB
ALBUMIN SERPL-MCNC: 2.9 G/DL (ref 3.5–5.2)
ALBUMIN/GLOB SERPL: 1.3 G/DL
ALP SERPL-CCNC: 268 U/L (ref 39–117)
ALT SERPL W P-5'-P-CCNC: 41 U/L (ref 1–33)
ANION GAP SERPL CALCULATED.3IONS-SCNC: 12 MMOL/L (ref 5–15)
ANISOCYTOSIS BLD QL: NORMAL
AST SERPL-CCNC: 37 U/L (ref 1–32)
BASOPHILS # BLD AUTO: 0.02 10*3/MM3 (ref 0–0.2)
BASOPHILS NFR BLD AUTO: 0.4 % (ref 0–1.5)
BILIRUB SERPL-MCNC: 0.9 MG/DL (ref 0–1.2)
BUN SERPL-MCNC: 67 MG/DL (ref 8–23)
BUN/CREAT SERPL: 46.5 (ref 7–25)
CALCIUM SPEC-SCNC: 8.6 MG/DL (ref 8.6–10.5)
CHLORIDE SERPL-SCNC: 97 MMOL/L (ref 98–107)
CO2 SERPL-SCNC: 28 MMOL/L (ref 22–29)
CREAT SERPL-MCNC: 1.44 MG/DL (ref 0.57–1)
DEPRECATED RDW RBC AUTO: 53.9 FL (ref 37–54)
EGFRCR SERPLBLD CKD-EPI 2021: 36.2 ML/MIN/1.73
EOSINOPHIL # BLD AUTO: 0.03 10*3/MM3 (ref 0–0.4)
EOSINOPHIL NFR BLD AUTO: 0.5 % (ref 0.3–6.2)
ERYTHROCYTE [DISTWIDTH] IN BLOOD BY AUTOMATED COUNT: 16.5 % (ref 12.3–15.4)
GLOBULIN UR ELPH-MCNC: 2.3 GM/DL
GLUCOSE BLDC GLUCOMTR-MCNC: 269 MG/DL (ref 70–130)
GLUCOSE BLDC GLUCOMTR-MCNC: 270 MG/DL (ref 70–130)
GLUCOSE BLDC GLUCOMTR-MCNC: 290 MG/DL (ref 70–130)
GLUCOSE BLDC GLUCOMTR-MCNC: 363 MG/DL (ref 70–130)
GLUCOSE BLDC GLUCOMTR-MCNC: 382 MG/DL (ref 70–130)
GLUCOSE BLDC GLUCOMTR-MCNC: 522 MG/DL (ref 70–130)
GLUCOSE SERPL-MCNC: 306 MG/DL (ref 65–99)
HCT VFR BLD AUTO: 31.7 % (ref 34–46.6)
HGB BLD-MCNC: 9.5 G/DL (ref 12–15.9)
HYPOCHROMIA BLD QL: NORMAL
IMM GRANULOCYTES # BLD AUTO: 0.47 10*3/MM3 (ref 0–0.05)
IMM GRANULOCYTES NFR BLD AUTO: 8.2 % (ref 0–0.5)
LYMPHOCYTES # BLD AUTO: 0.6 10*3/MM3 (ref 0.7–3.1)
LYMPHOCYTES NFR BLD AUTO: 10.5 % (ref 19.6–45.3)
MAGNESIUM SERPL-MCNC: 2 MG/DL (ref 1.6–2.4)
MCH RBC QN AUTO: 26.8 PG (ref 26.6–33)
MCHC RBC AUTO-ENTMCNC: 30 G/DL (ref 31.5–35.7)
MCV RBC AUTO: 89.5 FL (ref 79–97)
MONOCYTES # BLD AUTO: 0.78 10*3/MM3 (ref 0.1–0.9)
MONOCYTES NFR BLD AUTO: 13.7 % (ref 5–12)
NEUTROPHILS NFR BLD AUTO: 3.81 10*3/MM3 (ref 1.7–7)
NEUTROPHILS NFR BLD AUTO: 66.7 % (ref 42.7–76)
NRBC BLD AUTO-RTO: 0 /100 WBC (ref 0–0.2)
OVALOCYTES BLD QL SMEAR: NORMAL
PHOSPHATE SERPL-MCNC: 5.3 MG/DL (ref 2.5–4.5)
PLAT MORPH BLD: NORMAL
PLATELET # BLD AUTO: 246 10*3/MM3 (ref 140–450)
PMV BLD AUTO: 11 FL (ref 6–12)
POTASSIUM SERPL-SCNC: 4.3 MMOL/L (ref 3.5–5.2)
PROT SERPL-MCNC: 5.2 G/DL (ref 6–8.5)
RBC # BLD AUTO: 3.54 10*6/MM3 (ref 3.77–5.28)
SODIUM SERPL-SCNC: 137 MMOL/L (ref 136–145)
WBC MORPH BLD: NORMAL
WBC NRBC COR # BLD: 5.71 10*3/MM3 (ref 3.4–10.8)

## 2023-01-05 PROCEDURE — 25010000002 MORPHINE PER 10 MG: Performed by: INTERNAL MEDICINE

## 2023-01-05 PROCEDURE — 25010000002 HEPARIN (PORCINE) PER 1000 UNITS: Performed by: INTERNAL MEDICINE

## 2023-01-05 PROCEDURE — 97597 DBRDMT OPN WND 1ST 20 CM/<: CPT

## 2023-01-05 PROCEDURE — 94664 DEMO&/EVAL PT USE INHALER: CPT

## 2023-01-05 PROCEDURE — 80053 COMPREHEN METABOLIC PANEL: CPT | Performed by: PHYSICIAN ASSISTANT

## 2023-01-05 PROCEDURE — 85025 COMPLETE CBC W/AUTO DIFF WBC: CPT | Performed by: INTERNAL MEDICINE

## 2023-01-05 PROCEDURE — 94761 N-INVAS EAR/PLS OXIMETRY MLT: CPT

## 2023-01-05 PROCEDURE — 63710000001 INSULIN LISPRO (HUMAN) PER 5 UNITS: Performed by: INTERNAL MEDICINE

## 2023-01-05 PROCEDURE — 97162 PT EVAL MOD COMPLEX 30 MIN: CPT

## 2023-01-05 PROCEDURE — 83735 ASSAY OF MAGNESIUM: CPT | Performed by: INTERNAL MEDICINE

## 2023-01-05 PROCEDURE — 84100 ASSAY OF PHOSPHORUS: CPT | Performed by: INTERNAL MEDICINE

## 2023-01-05 PROCEDURE — 85007 BL SMEAR W/DIFF WBC COUNT: CPT | Performed by: INTERNAL MEDICINE

## 2023-01-05 PROCEDURE — 97165 OT EVAL LOW COMPLEX 30 MIN: CPT

## 2023-01-05 PROCEDURE — 71045 X-RAY EXAM CHEST 1 VIEW: CPT

## 2023-01-05 PROCEDURE — 97530 THERAPEUTIC ACTIVITIES: CPT

## 2023-01-05 PROCEDURE — 99232 SBSQ HOSP IP/OBS MODERATE 35: CPT | Performed by: HOSPITALIST

## 2023-01-05 PROCEDURE — 99232 SBSQ HOSP IP/OBS MODERATE 35: CPT | Performed by: PHYSICIAN ASSISTANT

## 2023-01-05 PROCEDURE — 94799 UNLISTED PULMONARY SVC/PX: CPT

## 2023-01-05 PROCEDURE — 82962 GLUCOSE BLOOD TEST: CPT

## 2023-01-05 RX ORDER — HYDROXYZINE HYDROCHLORIDE 10 MG/1
10 TABLET, FILM COATED ORAL EVERY 6 HOURS PRN
Status: DISCONTINUED | OUTPATIENT
Start: 2023-01-05 | End: 2023-01-06

## 2023-01-05 RX ORDER — IPRATROPIUM BROMIDE AND ALBUTEROL SULFATE 2.5; .5 MG/3ML; MG/3ML
3 SOLUTION RESPIRATORY (INHALATION) EVERY 6 HOURS PRN
Status: DISCONTINUED | OUTPATIENT
Start: 2023-01-05 | End: 2023-01-20 | Stop reason: HOSPADM

## 2023-01-05 RX ORDER — BUMETANIDE 1 MG/1
2 TABLET ORAL DAILY
Status: DISCONTINUED | OUTPATIENT
Start: 2023-01-05 | End: 2023-01-05

## 2023-01-05 RX ORDER — METOPROLOL SUCCINATE 25 MG/1
25 TABLET, EXTENDED RELEASE ORAL
Status: DISCONTINUED | OUTPATIENT
Start: 2023-01-05 | End: 2023-01-06

## 2023-01-05 RX ADMIN — HEPARIN SODIUM 5000 UNITS: 5000 INJECTION INTRAVENOUS; SUBCUTANEOUS at 14:56

## 2023-01-05 RX ADMIN — SENNOSIDES AND DOCUSATE SODIUM 2 TABLET: 50; 8.6 TABLET ORAL at 09:24

## 2023-01-05 RX ADMIN — INSULIN LISPRO 4 UNITS: 100 INJECTION, SOLUTION INTRAVENOUS; SUBCUTANEOUS at 17:19

## 2023-01-05 RX ADMIN — MORPHINE SULFATE 4 MG: 4 INJECTION, SOLUTION INTRAMUSCULAR; INTRAVENOUS at 09:35

## 2023-01-05 RX ADMIN — IPRATROPIUM BROMIDE AND ALBUTEROL SULFATE 3 ML: 2.5; .5 SOLUTION RESPIRATORY (INHALATION) at 13:40

## 2023-01-05 RX ADMIN — BUMETANIDE 2 MG: 1 TABLET ORAL at 12:04

## 2023-01-05 RX ADMIN — HEPARIN SODIUM 5000 UNITS: 5000 INJECTION INTRAVENOUS; SUBCUTANEOUS at 06:35

## 2023-01-05 RX ADMIN — SENNOSIDES AND DOCUSATE SODIUM 2 TABLET: 50; 8.6 TABLET ORAL at 22:16

## 2023-01-05 RX ADMIN — INSULIN LISPRO 4 UNITS: 100 INJECTION, SOLUTION INTRAVENOUS; SUBCUTANEOUS at 12:04

## 2023-01-05 RX ADMIN — HEPARIN SODIUM 5000 UNITS: 5000 INJECTION INTRAVENOUS; SUBCUTANEOUS at 21:59

## 2023-01-05 RX ADMIN — HYDROXYZINE HYDROCHLORIDE 10 MG: 10 TABLET ORAL at 23:31

## 2023-01-05 RX ADMIN — CLOPIDOGREL BISULFATE 75 MG: 75 TABLET ORAL at 09:25

## 2023-01-05 RX ADMIN — INSULIN LISPRO 4 UNITS: 100 INJECTION, SOLUTION INTRAVENOUS; SUBCUTANEOUS at 09:24

## 2023-01-05 RX ADMIN — GABAPENTIN 100 MG: 100 CAPSULE ORAL at 17:19

## 2023-01-05 RX ADMIN — IPRATROPIUM BROMIDE AND ALBUTEROL SULFATE 3 ML: 2.5; .5 SOLUTION RESPIRATORY (INHALATION) at 08:28

## 2023-01-05 RX ADMIN — GABAPENTIN 100 MG: 100 CAPSULE ORAL at 22:16

## 2023-01-05 RX ADMIN — INSULIN LISPRO 6 UNITS: 100 INJECTION, SOLUTION INTRAVENOUS; SUBCUTANEOUS at 21:59

## 2023-01-05 RX ADMIN — METOPROLOL SUCCINATE 25 MG: 25 TABLET, EXTENDED RELEASE ORAL at 12:05

## 2023-01-05 RX ADMIN — ATORVASTATIN CALCIUM 40 MG: 40 TABLET, FILM COATED ORAL at 09:24

## 2023-01-05 RX ADMIN — MORPHINE SULFATE 4 MG: 4 INJECTION, SOLUTION INTRAMUSCULAR; INTRAVENOUS at 23:31

## 2023-01-05 RX ADMIN — GABAPENTIN 100 MG: 100 CAPSULE ORAL at 09:25

## 2023-01-05 NOTE — PROGRESS NOTES
Discharge Summary  Discharge Summary from Physical Therapy Report      Patient: Susan Anderson   : 1940  Diagnosis/ICD-10 Code:  There were no encounter diagnoses.   Referring practitioner: No ref. provider found  Date of Initial Visit: No linked episodes  Today's Date: 2023  Date of Last Visit: 22     Number of Visits: 8    Discharge Status of Patient: hospital admission, change in health status    Goals: Not Met    Discharge Plan: Patient to return to referring/providing physician      Date of Discharge: 2023        Peyton Gayle, PT

## 2023-01-06 ENCOUNTER — DOCUMENTATION (OUTPATIENT)
Dept: PHYSICAL THERAPY | Facility: HOSPITAL | Age: 83
End: 2023-01-06
Payer: MEDICARE

## 2023-01-06 ENCOUNTER — APPOINTMENT (OUTPATIENT)
Dept: GENERAL RADIOLOGY | Facility: HOSPITAL | Age: 83
DRG: 917 | End: 2023-01-06
Payer: MEDICARE

## 2023-01-06 LAB
ANION GAP SERPL CALCULATED.3IONS-SCNC: 11 MMOL/L (ref 5–15)
BUN SERPL-MCNC: 77 MG/DL (ref 8–23)
BUN/CREAT SERPL: 56.2 (ref 7–25)
CALCIUM SPEC-SCNC: 8.6 MG/DL (ref 8.6–10.5)
CHLORIDE SERPL-SCNC: 98 MMOL/L (ref 98–107)
CO2 SERPL-SCNC: 29 MMOL/L (ref 22–29)
CREAT SERPL-MCNC: 1.37 MG/DL (ref 0.57–1)
EGFRCR SERPLBLD CKD-EPI 2021: 38.4 ML/MIN/1.73
GLUCOSE BLDC GLUCOMTR-MCNC: 189 MG/DL (ref 70–130)
GLUCOSE BLDC GLUCOMTR-MCNC: 243 MG/DL (ref 70–130)
GLUCOSE BLDC GLUCOMTR-MCNC: 338 MG/DL (ref 70–130)
GLUCOSE BLDC GLUCOMTR-MCNC: 357 MG/DL (ref 70–130)
GLUCOSE SERPL-MCNC: 357 MG/DL (ref 65–99)
POTASSIUM SERPL-SCNC: 4.5 MMOL/L (ref 3.5–5.2)
SODIUM SERPL-SCNC: 138 MMOL/L (ref 136–145)

## 2023-01-06 PROCEDURE — 99232 SBSQ HOSP IP/OBS MODERATE 35: CPT | Performed by: HOSPITALIST

## 2023-01-06 PROCEDURE — 92526 ORAL FUNCTION THERAPY: CPT

## 2023-01-06 PROCEDURE — 99232 SBSQ HOSP IP/OBS MODERATE 35: CPT

## 2023-01-06 PROCEDURE — 93005 ELECTROCARDIOGRAM TRACING: CPT | Performed by: HOSPITALIST

## 2023-01-06 PROCEDURE — 25010000002 HEPARIN (PORCINE) PER 1000 UNITS: Performed by: INTERNAL MEDICINE

## 2023-01-06 PROCEDURE — 93010 ELECTROCARDIOGRAM REPORT: CPT | Performed by: INTERNAL MEDICINE

## 2023-01-06 PROCEDURE — 71046 X-RAY EXAM CHEST 2 VIEWS: CPT

## 2023-01-06 PROCEDURE — 63710000001 INSULIN LISPRO (HUMAN) PER 5 UNITS: Performed by: INTERNAL MEDICINE

## 2023-01-06 PROCEDURE — 63710000001 INSULIN DETEMIR PER 5 UNITS: Performed by: HOSPITALIST

## 2023-01-06 PROCEDURE — 82962 GLUCOSE BLOOD TEST: CPT

## 2023-01-06 PROCEDURE — 80048 BASIC METABOLIC PNL TOTAL CA: CPT | Performed by: PHYSICIAN ASSISTANT

## 2023-01-06 RX ORDER — ACETAMINOPHEN 500 MG
500 TABLET ORAL EVERY 6 HOURS
Status: DISCONTINUED | OUTPATIENT
Start: 2023-01-06 | End: 2023-01-17

## 2023-01-06 RX ORDER — BISACODYL 10 MG
10 SUPPOSITORY, RECTAL RECTAL ONCE
Status: COMPLETED | OUTPATIENT
Start: 2023-01-06 | End: 2023-01-06

## 2023-01-06 RX ORDER — MORPHINE SULFATE 2 MG/ML
2 INJECTION, SOLUTION INTRAMUSCULAR; INTRAVENOUS EVERY 4 HOURS PRN
Status: DISCONTINUED | OUTPATIENT
Start: 2023-01-06 | End: 2023-01-10

## 2023-01-06 RX ORDER — BENZONATATE 100 MG/1
100 CAPSULE ORAL 3 TIMES DAILY PRN
Status: DISCONTINUED | OUTPATIENT
Start: 2023-01-06 | End: 2023-01-20 | Stop reason: HOSPADM

## 2023-01-06 RX ORDER — HYDROXYZINE HYDROCHLORIDE 10 MG/1
10 TABLET, FILM COATED ORAL 3 TIMES DAILY PRN
Status: DISCONTINUED | OUTPATIENT
Start: 2023-01-06 | End: 2023-01-17

## 2023-01-06 RX ORDER — DIAZEPAM 2 MG/1
2 TABLET ORAL EVERY 6 HOURS PRN
Status: DISCONTINUED | OUTPATIENT
Start: 2023-01-06 | End: 2023-01-14

## 2023-01-06 RX ORDER — METOPROLOL SUCCINATE 50 MG/1
50 TABLET, EXTENDED RELEASE ORAL
Status: DISCONTINUED | OUTPATIENT
Start: 2023-01-07 | End: 2023-01-20 | Stop reason: HOSPADM

## 2023-01-06 RX ORDER — BUMETANIDE 1 MG/1
0.5 TABLET ORAL DAILY
Status: DISCONTINUED | OUTPATIENT
Start: 2023-01-07 | End: 2023-01-08

## 2023-01-06 RX ADMIN — BISACODYL 5 MG: 5 TABLET, COATED ORAL at 17:29

## 2023-01-06 RX ADMIN — INSULIN DETEMIR 3 UNITS: 100 INJECTION, SOLUTION SUBCUTANEOUS at 23:39

## 2023-01-06 RX ADMIN — SENNOSIDES AND DOCUSATE SODIUM 2 TABLET: 50; 8.6 TABLET ORAL at 08:30

## 2023-01-06 RX ADMIN — INSULIN LISPRO 5 UNITS: 100 INJECTION, SOLUTION INTRAVENOUS; SUBCUTANEOUS at 08:28

## 2023-01-06 RX ADMIN — SENNOSIDES AND DOCUSATE SODIUM 2 TABLET: 50; 8.6 TABLET ORAL at 21:03

## 2023-01-06 RX ADMIN — GABAPENTIN 100 MG: 100 CAPSULE ORAL at 17:29

## 2023-01-06 RX ADMIN — GABAPENTIN 100 MG: 100 CAPSULE ORAL at 21:03

## 2023-01-06 RX ADMIN — APIXABAN 2.5 MG: 2.5 TABLET, FILM COATED ORAL at 08:46

## 2023-01-06 RX ADMIN — METOPROLOL SUCCINATE 25 MG: 25 TABLET, EXTENDED RELEASE ORAL at 08:29

## 2023-01-06 RX ADMIN — ATORVASTATIN CALCIUM 40 MG: 40 TABLET, FILM COATED ORAL at 08:29

## 2023-01-06 RX ADMIN — HEPARIN SODIUM 5000 UNITS: 5000 INJECTION INTRAVENOUS; SUBCUTANEOUS at 04:59

## 2023-01-06 RX ADMIN — CLOPIDOGREL BISULFATE 75 MG: 75 TABLET ORAL at 08:29

## 2023-01-06 RX ADMIN — INSULIN LISPRO 3 UNITS: 100 INJECTION, SOLUTION INTRAVENOUS; SUBCUTANEOUS at 17:29

## 2023-01-06 RX ADMIN — POLYETHYLENE GLYCOL 3350 17 G: 17 POWDER, FOR SOLUTION ORAL at 17:30

## 2023-01-06 RX ADMIN — BENZONATATE 100 MG: 100 CAPSULE ORAL at 23:38

## 2023-01-06 RX ADMIN — ACETAMINOPHEN 650 MG: 325 TABLET ORAL at 11:35

## 2023-01-06 RX ADMIN — INSULIN LISPRO 6 UNITS: 100 INJECTION, SOLUTION INTRAVENOUS; SUBCUTANEOUS at 11:34

## 2023-01-06 RX ADMIN — APIXABAN 2.5 MG: 2.5 TABLET, FILM COATED ORAL at 21:03

## 2023-01-06 RX ADMIN — ACETAMINOPHEN 500 MG: 500 TABLET, FILM COATED ORAL at 21:03

## 2023-01-06 RX ADMIN — INSULIN LISPRO 2 UNITS: 100 INJECTION, SOLUTION INTRAVENOUS; SUBCUTANEOUS at 21:03

## 2023-01-06 RX ADMIN — GABAPENTIN 100 MG: 100 CAPSULE ORAL at 08:29

## 2023-01-06 RX ADMIN — ACETAMINOPHEN 500 MG: 500 TABLET, FILM COATED ORAL at 17:29

## 2023-01-06 RX ADMIN — BISACODYL 10 MG: 10 SUPPOSITORY RECTAL at 17:26

## 2023-01-06 RX ADMIN — HYDROXYZINE HYDROCHLORIDE 10 MG: 10 TABLET ORAL at 08:29

## 2023-01-06 NOTE — THERAPY DISCHARGE NOTE
Outpatient Rehabilitation - Wound/Debridement D/C Summary        Patient Name: Susan Anderson  : 1940  MRN: [id number]  Today's Date: 2023                  Admit Date: (Not on file)    Visit Dx:  No diagnosis found.    Patient Active Problem List   Diagnosis   • Diabetic foot ulcer ([facility])   • PVD (peripheral vascular disease) ([facility])   • Osteomyelitis ([facility])   • Diabetes mellitus with neuropathy ([facility])   • Essential hypertension   • Stage 3a chronic kidney disease ([facility])   • Pyogenic inflammation of bone ([facility])   • Hyperglycemia   • Pneumonia due to infectious organism   • Mitral valve disease   • NICM (nonischemic cardiomyopathy) ([facility])   • CAD   • Dyslipidemia   • Chronic combined systolic and diastolic heart failure    • Lumbar stenosis with neurogenic claudication   • Spondylosis of lumbar region without myelopathy or radiculopathy   • Spondylolisthesis, lumbar region   • Degeneration of lumbar or lumbosacral intervertebral disc   • Gait disturbance   • Physical deconditioning   • Sarcopenia   • Weakness   • Anemia   • Fall   • Dizziness   • Demand ischemia ([facility])   • Effusion of right knee   • Right knee pain   • Pressure injury of skin of sacral region   • Sleep apnea   • GIB (gastrointestinal bleeding)   • Vasovagal syncope   • Hypomagnesemia   • Syncope, unspecified syncope type   • Paroxysmal atrial fibrillation   • Ulcer of esophagus without bleeding   • T2DM    • Cardiac arrest ([facility])        Past Medical History:   Diagnosis Date   • Anxiety    • Arthritis    • Asthma  - double pneumonia    Currently on inhaler and nebulizer   • Atrial fibrillation ([facility]) 2022   • Cancer ([facility])     cervical cancer, skin cancer   • CHF (congestive heart failure) ([facility]) 2021   • Chronic kidney disease Related to diabetes   • Coronary artery disease 2021 DX for hear failure   • Diabetes mellitus ([facility]) 30 years    Seeing Dr. Lam 1st time Aug 19   • GERD (gastroesophageal reflux disease)    • Gout    •  History of staph infection 10/2021    right toe   • History of transfusion     no reactions, 1 unit, [facility]   • Hx of colonoscopy    • Hyperlipidemia Reference current labs x 2-3yrs approx   • Hypertension 30 years   • Insomnia    • Migraine    • Mitral valve disease    • Mitral valve disease    • Osteomyelitis ([facility])    • Peripheral neuropathy    • Sleep apnea    • Type 2 diabetes mellitus (HCC)     30 years        Past Surgical History:   Procedure Laterality Date   • ABDOMINAL HYSTERECTOMY W/SALPINGECTOMY     • AMPUTATION  Right great toe, 1st 1/3 metatarsal -[place] 4/14/21   • AORTAGRAM N/A 04/09/2021    Procedure: AORTAGRAM WITH OR WITHOUT RUNOFFS, WITH Co2;  Surgeon: Trey Forrest MD;  Location:  [facility];  Service: Vascular;  Laterality: N/A;   • AORTAGRAM N/A 09/28/2022    Procedure: CO2 ANGIOGRAM, LEFT SFA ANGIOPLASTY WITH DRUG ELUTING BALLOON, LEFT SFA STENT PLACEMENT ;  Surgeon: Trey Forrest MD;  Location:  [facility];  Service: Vascular;  Laterality: N/A;  FLUORO: 13.12  DOSE 162mGy  CONTRAST: Isovue 350: 15ml   • APPENDECTOMY     • BRAIN TUMOR EXCISION      laser surgery    • CARDIAC CATHETERIZATION N/A 06/21/2021    Procedure: LEFT HEART CATH;  Surgeon: Anjum Ceballos MD;  Location:  [email or web address] CATH INVASIVE LOCATION;  Service: Cardiology;  Laterality: N/A;   • CATARACT EXTRACTION W/ INTRAOCULAR LENS  IMPLANT, BILATERAL     • CHOLECYSTECTOMY     • COLONOSCOPY     • ENDOSCOPY N/A 10/07/2022    Procedure: ESOPHAGOGASTRODUODENOSCOPY;  Surgeon: Stef Veras MD;  Location:  [email or web address];  Service: Gastroenterology;  Laterality: N/A;   • EYE SURGERY     • FEMORAL ARTERY STENT  10/2022   • HYSTERECTOMY     • TOE SURGERY     • TRANS METATARSAL AMPUTATION Right 04/14/2021    Procedure: AMPUTATION TRANS METATARSAL RIGHT GREAT TOE;  Surgeon: Valdez Finney MD;  Location:  [email or web address];  Service: Vascular;  Laterality: Right;         EVALUATION                WOUND DEBRIDEMENT                             Recommendation and Plan      Goals   PT OP Goals     Row Name 01/06/23 0800          PT Short Term Goals    STG 1 Reduce wound dimensions by 25% as evidence of wound healing.  -     STG 1 Progress Met  -     STG 2 Increase granulation formation to 25% of wound bed or greater, to promote wound closure.  -     STG 2 Progress Met  -        Long Term Goals    LTG 1 Pt/family independent with home dressing changes.  -     LTG 1 Progress Met  -     LTG 2 L foot wound to be closed/resurfaced to allow for full indepenent function.  -     LTG 2 Progress Partially Met  -     LTG 3 B heel fissures to be closed to indicate healing progress.  -     LTG 3 Progress Partially Met  -           User Key  (r) = Recorded By, (t) = Taken By, (c) = Cosigned By    Initials Name Provider Type    Lucila Mohan, PT Physical Therapist                Time Calculation:               OP Discharge Summary     Row Name 01/06/23 0840             OP PT Discharge Summary    Date of Discharge 01/06/23  -      Reason for Discharge Transfer to other facility/level of care  -      Outcomes Achieved Patient able to partially acheive established goals  -      Discharge Destination Hospital admission  -      Discharge Instructions/Additional Comments Pt hospitalized under inpatient status. Will need new MD referral to resume OP wound care upon d/c.  -            User Key  (r) = Recorded By, (t) = Taken By, (c) = Cosigned By    Initials Name Provider Type    Lucila Mohan, PT Physical Therapist                Lucila Ulrich, PT  1/6/2023

## 2023-01-07 LAB
BACTERIA UR QL AUTO: ABNORMAL /HPF
BILIRUB UR QL STRIP: NEGATIVE
CLARITY UR: ABNORMAL
COLOR UR: YELLOW
GLUCOSE BLDC GLUCOMTR-MCNC: 185 MG/DL (ref 70–130)
GLUCOSE BLDC GLUCOMTR-MCNC: 249 MG/DL (ref 70–130)
GLUCOSE BLDC GLUCOMTR-MCNC: 359 MG/DL (ref 70–130)
GLUCOSE BLDC GLUCOMTR-MCNC: 375 MG/DL (ref 70–130)
GLUCOSE BLDC GLUCOMTR-MCNC: 413 MG/DL (ref 70–130)
GLUCOSE UR STRIP-MCNC: ABNORMAL MG/DL
HGB UR QL STRIP.AUTO: ABNORMAL
HYALINE CASTS UR QL AUTO: ABNORMAL /LPF
KETONES UR QL STRIP: NEGATIVE
LEUKOCYTE ESTERASE UR QL STRIP.AUTO: ABNORMAL
NITRITE UR QL STRIP: NEGATIVE
PH UR STRIP.AUTO: <=5 [PH] (ref 5–8)
PROT UR QL STRIP: ABNORMAL
RBC # UR STRIP: ABNORMAL /HPF
REF LAB TEST METHOD: ABNORMAL
SP GR UR STRIP: 1.02 (ref 1–1.03)
SQUAMOUS #/AREA URNS HPF: ABNORMAL /HPF
UROBILINOGEN UR QL STRIP: ABNORMAL
WBC # UR STRIP: ABNORMAL /HPF

## 2023-01-07 PROCEDURE — 97597 DBRDMT OPN WND 1ST 20 CM/<: CPT

## 2023-01-07 PROCEDURE — 63710000001 INSULIN LISPRO (HUMAN) PER 5 UNITS: Performed by: INTERNAL MEDICINE

## 2023-01-07 PROCEDURE — 25010000002 MORPHINE PER 10 MG: Performed by: FAMILY MEDICINE

## 2023-01-07 PROCEDURE — 87186 SC STD MICRODIL/AGAR DIL: CPT | Performed by: NURSE PRACTITIONER

## 2023-01-07 PROCEDURE — 63710000001 INSULIN DETEMIR PER 5 UNITS: Performed by: HOSPITALIST

## 2023-01-07 PROCEDURE — 82962 GLUCOSE BLOOD TEST: CPT

## 2023-01-07 PROCEDURE — 87086 URINE CULTURE/COLONY COUNT: CPT | Performed by: NURSE PRACTITIONER

## 2023-01-07 PROCEDURE — 99232 SBSQ HOSP IP/OBS MODERATE 35: CPT | Performed by: HOSPITALIST

## 2023-01-07 PROCEDURE — 81001 URINALYSIS AUTO W/SCOPE: CPT | Performed by: HOSPITALIST

## 2023-01-07 PROCEDURE — 87077 CULTURE AEROBIC IDENTIFY: CPT | Performed by: NURSE PRACTITIONER

## 2023-01-07 RX ADMIN — GABAPENTIN 100 MG: 100 CAPSULE ORAL at 09:47

## 2023-01-07 RX ADMIN — INSULIN LISPRO 6 UNITS: 100 INJECTION, SOLUTION INTRAVENOUS; SUBCUTANEOUS at 20:51

## 2023-01-07 RX ADMIN — INSULIN DETEMIR 3 UNITS: 100 INJECTION, SOLUTION SUBCUTANEOUS at 20:50

## 2023-01-07 RX ADMIN — INSULIN LISPRO 6 UNITS: 100 INJECTION, SOLUTION INTRAVENOUS; SUBCUTANEOUS at 17:43

## 2023-01-07 RX ADMIN — BUMETANIDE 0.5 MG: 1 TABLET ORAL at 09:47

## 2023-01-07 RX ADMIN — ACETAMINOPHEN 500 MG: 500 TABLET, FILM COATED ORAL at 09:46

## 2023-01-07 RX ADMIN — MORPHINE SULFATE 2 MG: 2 INJECTION, SOLUTION INTRAMUSCULAR; INTRAVENOUS at 20:00

## 2023-01-07 RX ADMIN — APIXABAN 2.5 MG: 2.5 TABLET, FILM COATED ORAL at 21:58

## 2023-01-07 RX ADMIN — INSULIN LISPRO 3 UNITS: 100 INJECTION, SOLUTION INTRAVENOUS; SUBCUTANEOUS at 11:58

## 2023-01-07 RX ADMIN — CLOPIDOGREL BISULFATE 75 MG: 75 TABLET ORAL at 09:47

## 2023-01-07 RX ADMIN — APIXABAN 2.5 MG: 2.5 TABLET, FILM COATED ORAL at 09:47

## 2023-01-07 RX ADMIN — ACETAMINOPHEN 500 MG: 500 TABLET, FILM COATED ORAL at 16:53

## 2023-01-07 RX ADMIN — GABAPENTIN 100 MG: 100 CAPSULE ORAL at 16:53

## 2023-01-07 RX ADMIN — OMEPRAZOLE 20 MG: 20 CAPSULE, DELAYED RELEASE ORAL at 21:59

## 2023-01-07 RX ADMIN — GABAPENTIN 100 MG: 100 CAPSULE ORAL at 20:00

## 2023-01-07 RX ADMIN — INSULIN LISPRO 2 UNITS: 100 INJECTION, SOLUTION INTRAVENOUS; SUBCUTANEOUS at 09:46

## 2023-01-07 RX ADMIN — SENNOSIDES AND DOCUSATE SODIUM 2 TABLET: 50; 8.6 TABLET ORAL at 20:00

## 2023-01-07 RX ADMIN — BISACODYL 5 MG: 5 TABLET, COATED ORAL at 20:00

## 2023-01-07 RX ADMIN — ACETAMINOPHEN 500 MG: 500 TABLET, FILM COATED ORAL at 20:00

## 2023-01-07 RX ADMIN — ATORVASTATIN CALCIUM 40 MG: 40 TABLET, FILM COATED ORAL at 09:46

## 2023-01-08 ENCOUNTER — APPOINTMENT (OUTPATIENT)
Dept: GENERAL RADIOLOGY | Facility: HOSPITAL | Age: 83
DRG: 917 | End: 2023-01-08
Payer: MEDICARE

## 2023-01-08 LAB
ALBUMIN SERPL-MCNC: 2.8 G/DL (ref 3.5–5.2)
ALBUMIN/GLOB SERPL: 1.1 G/DL
ALP SERPL-CCNC: 245 U/L (ref 39–117)
ALT SERPL W P-5'-P-CCNC: 30 U/L (ref 1–33)
ANION GAP SERPL CALCULATED.3IONS-SCNC: 9 MMOL/L (ref 5–15)
AST SERPL-CCNC: 29 U/L (ref 1–32)
BASOPHILS # BLD AUTO: 0.02 10*3/MM3 (ref 0–0.2)
BASOPHILS NFR BLD AUTO: 0.3 % (ref 0–1.5)
BILIRUB SERPL-MCNC: 0.4 MG/DL (ref 0–1.2)
BUN SERPL-MCNC: 82 MG/DL (ref 8–23)
BUN/CREAT SERPL: 50.3 (ref 7–25)
CALCIUM SPEC-SCNC: 8.5 MG/DL (ref 8.6–10.5)
CHLORIDE SERPL-SCNC: 100 MMOL/L (ref 98–107)
CO2 SERPL-SCNC: 32 MMOL/L (ref 22–29)
CREAT SERPL-MCNC: 1.63 MG/DL (ref 0.57–1)
D-LACTATE SERPL-SCNC: 1.4 MMOL/L (ref 0.5–2)
DEPRECATED RDW RBC AUTO: 53.2 FL (ref 37–54)
EGFRCR SERPLBLD CKD-EPI 2021: 31.2 ML/MIN/1.73
ELLIPTOCYTES BLD QL SMEAR: NORMAL
EOSINOPHIL # BLD AUTO: 0.13 10*3/MM3 (ref 0–0.4)
EOSINOPHIL NFR BLD AUTO: 2.2 % (ref 0.3–6.2)
ERYTHROCYTE [DISTWIDTH] IN BLOOD BY AUTOMATED COUNT: 16.3 % (ref 12.3–15.4)
GLOBULIN UR ELPH-MCNC: 2.6 GM/DL
GLUCOSE BLDC GLUCOMTR-MCNC: 246 MG/DL (ref 70–130)
GLUCOSE BLDC GLUCOMTR-MCNC: 310 MG/DL (ref 70–130)
GLUCOSE BLDC GLUCOMTR-MCNC: 327 MG/DL (ref 70–130)
GLUCOSE BLDC GLUCOMTR-MCNC: 353 MG/DL (ref 70–130)
GLUCOSE SERPL-MCNC: 248 MG/DL (ref 65–99)
HCT VFR BLD AUTO: 33.2 % (ref 34–46.6)
HGB BLD-MCNC: 10 G/DL (ref 12–15.9)
IMM GRANULOCYTES # BLD AUTO: 0.49 10*3/MM3 (ref 0–0.05)
IMM GRANULOCYTES NFR BLD AUTO: 8.4 % (ref 0–0.5)
LYMPHOCYTES # BLD AUTO: 0.69 10*3/MM3 (ref 0.7–3.1)
LYMPHOCYTES NFR BLD AUTO: 11.8 % (ref 19.6–45.3)
MACROCYTES BLD QL SMEAR: NORMAL
MAGNESIUM SERPL-MCNC: 2 MG/DL (ref 1.6–2.4)
MCH RBC QN AUTO: 27.2 PG (ref 26.6–33)
MCHC RBC AUTO-ENTMCNC: 30.1 G/DL (ref 31.5–35.7)
MCV RBC AUTO: 90.5 FL (ref 79–97)
MONOCYTES # BLD AUTO: 0.84 10*3/MM3 (ref 0.1–0.9)
MONOCYTES NFR BLD AUTO: 14.4 % (ref 5–12)
MRSA DNA SPEC QL NAA+PROBE: POSITIVE
NEUTROPHILS NFR BLD AUTO: 3.67 10*3/MM3 (ref 1.7–7)
NEUTROPHILS NFR BLD AUTO: 62.9 % (ref 42.7–76)
NRBC BLD AUTO-RTO: 0 /100 WBC (ref 0–0.2)
PHOSPHATE SERPL-MCNC: 4.4 MG/DL (ref 2.5–4.5)
PLAT MORPH BLD: NORMAL
PLATELET # BLD AUTO: 309 10*3/MM3 (ref 140–450)
PMV BLD AUTO: 10.9 FL (ref 6–12)
POTASSIUM SERPL-SCNC: 4.7 MMOL/L (ref 3.5–5.2)
PROT SERPL-MCNC: 5.4 G/DL (ref 6–8.5)
QT INTERVAL: 412 MS
QTC INTERVAL: 414 MS
RBC # BLD AUTO: 3.67 10*6/MM3 (ref 3.77–5.28)
SODIUM SERPL-SCNC: 141 MMOL/L (ref 136–145)
WBC MORPH BLD: NORMAL
WBC NRBC COR # BLD: 5.84 10*3/MM3 (ref 3.4–10.8)

## 2023-01-08 PROCEDURE — 85025 COMPLETE CBC W/AUTO DIFF WBC: CPT | Performed by: HOSPITALIST

## 2023-01-08 PROCEDURE — 63710000001 INSULIN LISPRO (HUMAN) PER 5 UNITS: Performed by: INTERNAL MEDICINE

## 2023-01-08 PROCEDURE — 85007 BL SMEAR W/DIFF WBC COUNT: CPT | Performed by: HOSPITALIST

## 2023-01-08 PROCEDURE — 71045 X-RAY EXAM CHEST 1 VIEW: CPT

## 2023-01-08 PROCEDURE — 87641 MR-STAPH DNA AMP PROBE: CPT | Performed by: HOSPITALIST

## 2023-01-08 PROCEDURE — 80053 COMPREHEN METABOLIC PANEL: CPT | Performed by: HOSPITALIST

## 2023-01-08 PROCEDURE — 92610 EVALUATE SWALLOWING FUNCTION: CPT

## 2023-01-08 PROCEDURE — 25010000002 PIPERACILLIN SOD-TAZOBACTAM PER 1 G: Performed by: HOSPITALIST

## 2023-01-08 PROCEDURE — 25010000002 VANCOMYCIN 10 G RECONSTITUTED SOLUTION: Performed by: HOSPITALIST

## 2023-01-08 PROCEDURE — 83605 ASSAY OF LACTIC ACID: CPT | Performed by: HOSPITALIST

## 2023-01-08 PROCEDURE — 82962 GLUCOSE BLOOD TEST: CPT

## 2023-01-08 PROCEDURE — 84100 ASSAY OF PHOSPHORUS: CPT | Performed by: HOSPITALIST

## 2023-01-08 PROCEDURE — 25010000002 CEFTRIAXONE PER 250 MG: Performed by: NURSE PRACTITIONER

## 2023-01-08 PROCEDURE — 63710000001 INSULIN DETEMIR PER 5 UNITS: Performed by: HOSPITALIST

## 2023-01-08 PROCEDURE — 99233 SBSQ HOSP IP/OBS HIGH 50: CPT | Performed by: HOSPITALIST

## 2023-01-08 PROCEDURE — 83735 ASSAY OF MAGNESIUM: CPT | Performed by: HOSPITALIST

## 2023-01-08 RX ORDER — BUMETANIDE 0.25 MG/ML
0.5 INJECTION INTRAMUSCULAR; INTRAVENOUS ONCE
Status: COMPLETED | OUTPATIENT
Start: 2023-01-08 | End: 2023-01-08

## 2023-01-08 RX ORDER — SODIUM CHLORIDE FOR INHALATION 7 %
4 VIAL, NEBULIZER (ML) INHALATION ONCE
Status: COMPLETED | OUTPATIENT
Start: 2023-01-08 | End: 2023-01-08

## 2023-01-08 RX ADMIN — CLOPIDOGREL BISULFATE 75 MG: 75 TABLET ORAL at 09:28

## 2023-01-08 RX ADMIN — GABAPENTIN 100 MG: 100 CAPSULE ORAL at 21:21

## 2023-01-08 RX ADMIN — ACETAMINOPHEN 500 MG: 500 TABLET, FILM COATED ORAL at 21:22

## 2023-01-08 RX ADMIN — OMEPRAZOLE 20 MG: 20 CAPSULE, DELAYED RELEASE ORAL at 08:00

## 2023-01-08 RX ADMIN — ATORVASTATIN CALCIUM 40 MG: 40 TABLET, FILM COATED ORAL at 09:28

## 2023-01-08 RX ADMIN — INSULIN LISPRO 5 UNITS: 100 INJECTION, SOLUTION INTRAVENOUS; SUBCUTANEOUS at 21:21

## 2023-01-08 RX ADMIN — ACETAMINOPHEN 500 MG: 500 TABLET, FILM COATED ORAL at 09:27

## 2023-01-08 RX ADMIN — TAZOBACTAM SODIUM AND PIPERACILLIN SODIUM 3.38 G: 375; 3 INJECTION, SOLUTION INTRAVENOUS at 16:01

## 2023-01-08 RX ADMIN — INSULIN LISPRO 3 UNITS: 100 INJECTION, SOLUTION INTRAVENOUS; SUBCUTANEOUS at 07:56

## 2023-01-08 RX ADMIN — VANCOMYCIN HYDROCHLORIDE 1750 MG: 10 INJECTION, POWDER, LYOPHILIZED, FOR SOLUTION INTRAVENOUS at 13:12

## 2023-01-08 RX ADMIN — INSULIN LISPRO 6 UNITS: 100 INJECTION, SOLUTION INTRAVENOUS; SUBCUTANEOUS at 18:04

## 2023-01-08 RX ADMIN — APIXABAN 2.5 MG: 2.5 TABLET, FILM COATED ORAL at 09:28

## 2023-01-08 RX ADMIN — APIXABAN 2.5 MG: 2.5 TABLET, FILM COATED ORAL at 21:22

## 2023-01-08 RX ADMIN — SODIUM CHLORIDE 4 ML: 7 NEBU SOLN,3 % NEBU at 17:15

## 2023-01-08 RX ADMIN — BUMETANIDE 0.5 MG: 0.25 INJECTION INTRAMUSCULAR; INTRAVENOUS at 09:32

## 2023-01-08 RX ADMIN — METOPROLOL SUCCINATE 50 MG: 50 TABLET, EXTENDED RELEASE ORAL at 09:27

## 2023-01-08 RX ADMIN — SENNOSIDES AND DOCUSATE SODIUM 2 TABLET: 50; 8.6 TABLET ORAL at 09:28

## 2023-01-08 RX ADMIN — SODIUM CHLORIDE 1 G: 900 INJECTION INTRAVENOUS at 02:27

## 2023-01-08 RX ADMIN — GABAPENTIN 100 MG: 100 CAPSULE ORAL at 16:01

## 2023-01-08 RX ADMIN — OMEPRAZOLE 20 MG: 20 CAPSULE, DELAYED RELEASE ORAL at 21:22

## 2023-01-08 RX ADMIN — TAZOBACTAM SODIUM AND PIPERACILLIN SODIUM 3.38 G: 375; 3 INJECTION, SOLUTION INTRAVENOUS at 09:33

## 2023-01-08 RX ADMIN — INSULIN LISPRO 5 UNITS: 100 INJECTION, SOLUTION INTRAVENOUS; SUBCUTANEOUS at 12:18

## 2023-01-08 RX ADMIN — INSULIN DETEMIR 10 UNITS: 100 INJECTION, SOLUTION SUBCUTANEOUS at 09:32

## 2023-01-08 RX ADMIN — ACETAMINOPHEN 500 MG: 500 TABLET, FILM COATED ORAL at 04:03

## 2023-01-08 RX ADMIN — GABAPENTIN 100 MG: 100 CAPSULE ORAL at 09:27

## 2023-01-08 RX ADMIN — ACETAMINOPHEN 500 MG: 500 TABLET, FILM COATED ORAL at 16:02

## 2023-01-08 RX ADMIN — SENNOSIDES AND DOCUSATE SODIUM 2 TABLET: 50; 8.6 TABLET ORAL at 21:22

## 2023-01-09 ENCOUNTER — APPOINTMENT (OUTPATIENT)
Dept: GENERAL RADIOLOGY | Facility: HOSPITAL | Age: 83
DRG: 917 | End: 2023-01-09
Payer: MEDICARE

## 2023-01-09 ENCOUNTER — APPOINTMENT (OUTPATIENT)
Dept: CARDIOLOGY | Facility: HOSPITAL | Age: 83
DRG: 917 | End: 2023-01-09
Payer: MEDICARE

## 2023-01-09 LAB
ANION GAP SERPL CALCULATED.3IONS-SCNC: 13 MMOL/L (ref 5–15)
BUN SERPL-MCNC: 78 MG/DL (ref 8–23)
BUN/CREAT SERPL: 49.4 (ref 7–25)
CALCIUM SPEC-SCNC: 8.4 MG/DL (ref 8.6–10.5)
CHLORIDE SERPL-SCNC: 101 MMOL/L (ref 98–107)
CO2 SERPL-SCNC: 26 MMOL/L (ref 22–29)
CREAT SERPL-MCNC: 1.58 MG/DL (ref 0.57–1)
DEPRECATED RDW RBC AUTO: 56.1 FL (ref 37–54)
EGFRCR SERPLBLD CKD-EPI 2021: 32.4 ML/MIN/1.73
ERYTHROCYTE [DISTWIDTH] IN BLOOD BY AUTOMATED COUNT: 16.5 % (ref 12.3–15.4)
GLUCOSE BLDC GLUCOMTR-MCNC: 292 MG/DL (ref 70–130)
GLUCOSE BLDC GLUCOMTR-MCNC: 324 MG/DL (ref 70–130)
GLUCOSE BLDC GLUCOMTR-MCNC: 386 MG/DL (ref 70–130)
GLUCOSE BLDC GLUCOMTR-MCNC: 419 MG/DL (ref 70–130)
GLUCOSE BLDC GLUCOMTR-MCNC: 461 MG/DL (ref 70–130)
GLUCOSE SERPL-MCNC: 257 MG/DL (ref 65–99)
HCT VFR BLD AUTO: 35.2 % (ref 34–46.6)
HGB BLD-MCNC: 10.3 G/DL (ref 12–15.9)
MCH RBC QN AUTO: 27.6 PG (ref 26.6–33)
MCHC RBC AUTO-ENTMCNC: 29.3 G/DL (ref 31.5–35.7)
MCV RBC AUTO: 94.4 FL (ref 79–97)
PLATELET # BLD AUTO: 269 10*3/MM3 (ref 140–450)
PMV BLD AUTO: 10.9 FL (ref 6–12)
POTASSIUM SERPL-SCNC: 4.7 MMOL/L (ref 3.5–5.2)
PROCALCITONIN SERPL-MCNC: 0.18 NG/ML (ref 0–0.25)
RBC # BLD AUTO: 3.73 10*6/MM3 (ref 3.77–5.28)
SODIUM SERPL-SCNC: 140 MMOL/L (ref 136–145)
VANCOMYCIN SERPL-MCNC: 13.7 MCG/ML (ref 5–40)
WBC NRBC COR # BLD: 5.2 10*3/MM3 (ref 3.4–10.8)

## 2023-01-09 PROCEDURE — 82962 GLUCOSE BLOOD TEST: CPT

## 2023-01-09 PROCEDURE — 63710000001 INSULIN DETEMIR PER 5 UNITS: Performed by: HOSPITALIST

## 2023-01-09 PROCEDURE — 97116 GAIT TRAINING THERAPY: CPT

## 2023-01-09 PROCEDURE — 63710000001 INSULIN LISPRO (HUMAN) PER 5 UNITS: Performed by: INTERNAL MEDICINE

## 2023-01-09 PROCEDURE — 25010000002 PIPERACILLIN SOD-TAZOBACTAM PER 1 G: Performed by: HOSPITALIST

## 2023-01-09 PROCEDURE — 84145 PROCALCITONIN (PCT): CPT | Performed by: INTERNAL MEDICINE

## 2023-01-09 PROCEDURE — 97530 THERAPEUTIC ACTIVITIES: CPT

## 2023-01-09 PROCEDURE — 85027 COMPLETE CBC AUTOMATED: CPT | Performed by: HOSPITALIST

## 2023-01-09 PROCEDURE — 80202 ASSAY OF VANCOMYCIN: CPT | Performed by: HOSPITALIST

## 2023-01-09 PROCEDURE — 25010000002 VANCOMYCIN PER 500 MG: Performed by: HOSPITALIST

## 2023-01-09 PROCEDURE — 94799 UNLISTED PULMONARY SVC/PX: CPT

## 2023-01-09 PROCEDURE — 92611 MOTION FLUOROSCOPY/SWALLOW: CPT

## 2023-01-09 PROCEDURE — 80048 BASIC METABOLIC PNL TOTAL CA: CPT | Performed by: HOSPITALIST

## 2023-01-09 PROCEDURE — 97535 SELF CARE MNGMENT TRAINING: CPT

## 2023-01-09 PROCEDURE — 99231 SBSQ HOSP IP/OBS SF/LOW 25: CPT | Performed by: NURSE PRACTITIONER

## 2023-01-09 PROCEDURE — 87205 SMEAR GRAM STAIN: CPT | Performed by: INTERNAL MEDICINE

## 2023-01-09 PROCEDURE — 87070 CULTURE OTHR SPECIMN AEROBIC: CPT | Performed by: INTERNAL MEDICINE

## 2023-01-09 PROCEDURE — 74230 X-RAY XM SWLNG FUNCJ C+: CPT

## 2023-01-09 PROCEDURE — 99232 SBSQ HOSP IP/OBS MODERATE 35: CPT | Performed by: HOSPITALIST

## 2023-01-09 PROCEDURE — 71045 X-RAY EXAM CHEST 1 VIEW: CPT

## 2023-01-09 RX ORDER — IPRATROPIUM BROMIDE AND ALBUTEROL SULFATE 2.5; .5 MG/3ML; MG/3ML
3 SOLUTION RESPIRATORY (INHALATION)
Status: DISCONTINUED | OUTPATIENT
Start: 2023-01-09 | End: 2023-01-16

## 2023-01-09 RX ADMIN — SENNOSIDES AND DOCUSATE SODIUM 2 TABLET: 50; 8.6 TABLET ORAL at 21:11

## 2023-01-09 RX ADMIN — BARIUM SULFATE 50 ML: 400 SUSPENSION ORAL at 15:11

## 2023-01-09 RX ADMIN — VANCOMYCIN HYDROCHLORIDE 750 MG: 750 INJECTION, SOLUTION INTRAVENOUS at 11:05

## 2023-01-09 RX ADMIN — INSULIN LISPRO 7 UNITS: 100 INJECTION, SOLUTION INTRAVENOUS; SUBCUTANEOUS at 17:13

## 2023-01-09 RX ADMIN — ACETAMINOPHEN 500 MG: 500 TABLET, FILM COATED ORAL at 09:30

## 2023-01-09 RX ADMIN — TAZOBACTAM SODIUM AND PIPERACILLIN SODIUM 3.38 G: 375; 3 INJECTION, SOLUTION INTRAVENOUS at 01:24

## 2023-01-09 RX ADMIN — IPRATROPIUM BROMIDE AND ALBUTEROL SULFATE 3 ML: 2.5; .5 SOLUTION RESPIRATORY (INHALATION) at 16:46

## 2023-01-09 RX ADMIN — GABAPENTIN 100 MG: 100 CAPSULE ORAL at 21:11

## 2023-01-09 RX ADMIN — GABAPENTIN 100 MG: 100 CAPSULE ORAL at 15:52

## 2023-01-09 RX ADMIN — OMEPRAZOLE 20 MG: 20 CAPSULE, DELAYED RELEASE ORAL at 08:38

## 2023-01-09 RX ADMIN — GABAPENTIN 100 MG: 100 CAPSULE ORAL at 09:30

## 2023-01-09 RX ADMIN — TAZOBACTAM SODIUM AND PIPERACILLIN SODIUM 3.38 G: 375; 3 INJECTION, SOLUTION INTRAVENOUS at 15:52

## 2023-01-09 RX ADMIN — HYDROXYZINE HYDROCHLORIDE 10 MG: 10 TABLET, FILM COATED ORAL at 21:11

## 2023-01-09 RX ADMIN — APIXABAN 2.5 MG: 2.5 TABLET, FILM COATED ORAL at 21:11

## 2023-01-09 RX ADMIN — CLOPIDOGREL BISULFATE 75 MG: 75 TABLET ORAL at 09:30

## 2023-01-09 RX ADMIN — BARIUM SULFATE 10 ML: 400 SUSPENSION ORAL at 15:11

## 2023-01-09 RX ADMIN — BARIUM SULFATE 100 ML: 0.81 POWDER, FOR SUSPENSION ORAL at 15:11

## 2023-01-09 RX ADMIN — IPRATROPIUM BROMIDE AND ALBUTEROL SULFATE 3 ML: 2.5; .5 SOLUTION RESPIRATORY (INHALATION) at 21:30

## 2023-01-09 RX ADMIN — BARIUM SULFATE 20 ML: 400 PASTE ORAL at 15:11

## 2023-01-09 RX ADMIN — INSULIN LISPRO 4 UNITS: 100 INJECTION, SOLUTION INTRAVENOUS; SUBCUTANEOUS at 08:38

## 2023-01-09 RX ADMIN — IPRATROPIUM BROMIDE AND ALBUTEROL SULFATE 3 ML: 2.5; .5 SOLUTION RESPIRATORY (INHALATION) at 12:35

## 2023-01-09 RX ADMIN — IPRATROPIUM BROMIDE AND ALBUTEROL SULFATE 3 ML: 2.5; .5 SOLUTION RESPIRATORY (INHALATION) at 08:36

## 2023-01-09 RX ADMIN — INSULIN LISPRO 5 UNITS: 100 INJECTION, SOLUTION INTRAVENOUS; SUBCUTANEOUS at 11:46

## 2023-01-09 RX ADMIN — OMEPRAZOLE 20 MG: 20 CAPSULE, DELAYED RELEASE ORAL at 21:12

## 2023-01-09 RX ADMIN — SENNOSIDES AND DOCUSATE SODIUM 2 TABLET: 50; 8.6 TABLET ORAL at 09:30

## 2023-01-09 RX ADMIN — INSULIN LISPRO 6 UNITS: 100 INJECTION, SOLUTION INTRAVENOUS; SUBCUTANEOUS at 21:10

## 2023-01-09 RX ADMIN — ATORVASTATIN CALCIUM 40 MG: 40 TABLET, FILM COATED ORAL at 09:30

## 2023-01-09 RX ADMIN — ACETAMINOPHEN 500 MG: 500 TABLET, FILM COATED ORAL at 15:52

## 2023-01-09 RX ADMIN — INSULIN DETEMIR 15 UNITS: 100 INJECTION, SOLUTION SUBCUTANEOUS at 09:29

## 2023-01-09 RX ADMIN — ACETAMINOPHEN 500 MG: 500 TABLET, FILM COATED ORAL at 21:11

## 2023-01-09 RX ADMIN — METOPROLOL SUCCINATE 50 MG: 50 TABLET, EXTENDED RELEASE ORAL at 09:29

## 2023-01-09 RX ADMIN — APIXABAN 2.5 MG: 2.5 TABLET, FILM COATED ORAL at 09:30

## 2023-01-09 RX ADMIN — ACETAMINOPHEN 500 MG: 500 TABLET, FILM COATED ORAL at 05:43

## 2023-01-09 RX ADMIN — TAZOBACTAM SODIUM AND PIPERACILLIN SODIUM 3.38 G: 375; 3 INJECTION, SOLUTION INTRAVENOUS at 09:28

## 2023-01-10 ENCOUNTER — APPOINTMENT (OUTPATIENT)
Dept: GENERAL RADIOLOGY | Facility: HOSPITAL | Age: 83
DRG: 917 | End: 2023-01-10
Payer: MEDICARE

## 2023-01-10 LAB
ANION GAP SERPL CALCULATED.3IONS-SCNC: 11 MMOL/L (ref 5–15)
BACTERIA SPEC AEROBE CULT: ABNORMAL
BACTERIA SPEC AEROBE CULT: ABNORMAL
BUN SERPL-MCNC: 77 MG/DL (ref 8–23)
BUN/CREAT SERPL: 43.5 (ref 7–25)
CALCIUM SPEC-SCNC: 8.2 MG/DL (ref 8.6–10.5)
CHLORIDE SERPL-SCNC: 104 MMOL/L (ref 98–107)
CO2 SERPL-SCNC: 30 MMOL/L (ref 22–29)
CREAT SERPL-MCNC: 1.77 MG/DL (ref 0.57–1)
CREAT UR-MCNC: 63.4 MG/DL
DEPRECATED RDW RBC AUTO: 55.3 FL (ref 37–54)
EGFRCR SERPLBLD CKD-EPI 2021: 28.2 ML/MIN/1.73
ERYTHROCYTE [DISTWIDTH] IN BLOOD BY AUTOMATED COUNT: 16.7 % (ref 12.3–15.4)
FERRITIN SERPL-MCNC: 76.38 NG/ML (ref 13–150)
GLUCOSE BLDC GLUCOMTR-MCNC: 233 MG/DL (ref 70–130)
GLUCOSE BLDC GLUCOMTR-MCNC: 276 MG/DL (ref 70–130)
GLUCOSE BLDC GLUCOMTR-MCNC: 283 MG/DL (ref 70–130)
GLUCOSE BLDC GLUCOMTR-MCNC: 294 MG/DL (ref 70–130)
GLUCOSE BLDC GLUCOMTR-MCNC: 306 MG/DL (ref 70–130)
GLUCOSE SERPL-MCNC: 201 MG/DL (ref 65–99)
HCT VFR BLD AUTO: 31.6 % (ref 34–46.6)
HGB BLD-MCNC: 9.4 G/DL (ref 12–15.9)
IRON 24H UR-MRATE: 23 MCG/DL (ref 37–145)
IRON SATN MFR SERPL: 7 % (ref 20–50)
MCH RBC QN AUTO: 27.2 PG (ref 26.6–33)
MCHC RBC AUTO-ENTMCNC: 29.7 G/DL (ref 31.5–35.7)
MCV RBC AUTO: 91.3 FL (ref 79–97)
PLATELET # BLD AUTO: 302 10*3/MM3 (ref 140–450)
PMV BLD AUTO: 10.8 FL (ref 6–12)
POTASSIUM SERPL-SCNC: 4.2 MMOL/L (ref 3.5–5.2)
PROT ?TM UR-MCNC: 369.9 MG/DL
RBC # BLD AUTO: 3.46 10*6/MM3 (ref 3.77–5.28)
SODIUM SERPL-SCNC: 145 MMOL/L (ref 136–145)
TIBC SERPL-MCNC: 320 MCG/DL (ref 298–536)
TRANSFERRIN SERPL-MCNC: 215 MG/DL (ref 200–360)
VANCOMYCIN SERPL-MCNC: 15.5 MCG/ML (ref 5–40)
WBC NRBC COR # BLD: 5.49 10*3/MM3 (ref 3.4–10.8)

## 2023-01-10 PROCEDURE — 94664 DEMO&/EVAL PT USE INHALER: CPT

## 2023-01-10 PROCEDURE — 99232 SBSQ HOSP IP/OBS MODERATE 35: CPT | Performed by: PHYSICIAN ASSISTANT

## 2023-01-10 PROCEDURE — 94799 UNLISTED PULMONARY SVC/PX: CPT

## 2023-01-10 PROCEDURE — 63710000001 INSULIN LISPRO (HUMAN) PER 5 UNITS: Performed by: INTERNAL MEDICINE

## 2023-01-10 PROCEDURE — 63710000001 INSULIN DETEMIR PER 5 UNITS: Performed by: HOSPITALIST

## 2023-01-10 PROCEDURE — 83540 ASSAY OF IRON: CPT | Performed by: INTERNAL MEDICINE

## 2023-01-10 PROCEDURE — 84156 ASSAY OF PROTEIN URINE: CPT | Performed by: INTERNAL MEDICINE

## 2023-01-10 PROCEDURE — 82962 GLUCOSE BLOOD TEST: CPT

## 2023-01-10 PROCEDURE — 84466 ASSAY OF TRANSFERRIN: CPT | Performed by: INTERNAL MEDICINE

## 2023-01-10 PROCEDURE — 80048 BASIC METABOLIC PNL TOTAL CA: CPT | Performed by: HOSPITALIST

## 2023-01-10 PROCEDURE — 25010000002 PIPERACILLIN SOD-TAZOBACTAM PER 1 G: Performed by: HOSPITALIST

## 2023-01-10 PROCEDURE — 82728 ASSAY OF FERRITIN: CPT | Performed by: INTERNAL MEDICINE

## 2023-01-10 PROCEDURE — 94761 N-INVAS EAR/PLS OXIMETRY MLT: CPT

## 2023-01-10 PROCEDURE — 25010000002 VANCOMYCIN PER 500 MG: Performed by: HOSPITALIST

## 2023-01-10 PROCEDURE — 25010000002 ONDANSETRON PER 1 MG: Performed by: INTERNAL MEDICINE

## 2023-01-10 PROCEDURE — 82570 ASSAY OF URINE CREATININE: CPT | Performed by: INTERNAL MEDICINE

## 2023-01-10 PROCEDURE — 85027 COMPLETE CBC AUTOMATED: CPT | Performed by: HOSPITALIST

## 2023-01-10 PROCEDURE — 71045 X-RAY EXAM CHEST 1 VIEW: CPT

## 2023-01-10 PROCEDURE — 25010000002 NA FERRIC GLUC CPLX PER 12.5 MG: Performed by: INTERNAL MEDICINE

## 2023-01-10 PROCEDURE — 99232 SBSQ HOSP IP/OBS MODERATE 35: CPT | Performed by: HOSPITALIST

## 2023-01-10 PROCEDURE — 80202 ASSAY OF VANCOMYCIN: CPT | Performed by: HOSPITALIST

## 2023-01-10 RX ORDER — MORPHINE SULFATE 20 MG/ML
5 SOLUTION ORAL EVERY 4 HOURS PRN
Status: DISCONTINUED | OUTPATIENT
Start: 2023-01-10 | End: 2023-01-14

## 2023-01-10 RX ADMIN — MORPHINE SULFATE 5 MG: 100 SOLUTION ORAL at 10:49

## 2023-01-10 RX ADMIN — DIAZEPAM 2 MG: 2 TABLET ORAL at 13:06

## 2023-01-10 RX ADMIN — ATORVASTATIN CALCIUM 40 MG: 40 TABLET, FILM COATED ORAL at 09:46

## 2023-01-10 RX ADMIN — TAZOBACTAM SODIUM AND PIPERACILLIN SODIUM 3.38 G: 375; 3 INJECTION, SOLUTION INTRAVENOUS at 09:42

## 2023-01-10 RX ADMIN — INSULIN LISPRO 5 UNITS: 100 INJECTION, SOLUTION INTRAVENOUS; SUBCUTANEOUS at 18:24

## 2023-01-10 RX ADMIN — INSULIN LISPRO 4 UNITS: 100 INJECTION, SOLUTION INTRAVENOUS; SUBCUTANEOUS at 12:17

## 2023-01-10 RX ADMIN — INSULIN LISPRO 4 UNITS: 100 INJECTION, SOLUTION INTRAVENOUS; SUBCUTANEOUS at 20:29

## 2023-01-10 RX ADMIN — IPRATROPIUM BROMIDE AND ALBUTEROL SULFATE 3 ML: 2.5; .5 SOLUTION RESPIRATORY (INHALATION) at 19:55

## 2023-01-10 RX ADMIN — TAZOBACTAM SODIUM AND PIPERACILLIN SODIUM 3.38 G: 375; 3 INJECTION, SOLUTION INTRAVENOUS at 01:30

## 2023-01-10 RX ADMIN — GABAPENTIN 100 MG: 100 CAPSULE ORAL at 20:29

## 2023-01-10 RX ADMIN — APIXABAN 2.5 MG: 2.5 TABLET, FILM COATED ORAL at 20:29

## 2023-01-10 RX ADMIN — INSULIN DETEMIR 15 UNITS: 100 INJECTION, SOLUTION SUBCUTANEOUS at 09:47

## 2023-01-10 RX ADMIN — IPRATROPIUM BROMIDE AND ALBUTEROL SULFATE 3 ML: 2.5; .5 SOLUTION RESPIRATORY (INHALATION) at 13:55

## 2023-01-10 RX ADMIN — CLOPIDOGREL BISULFATE 75 MG: 75 TABLET ORAL at 09:47

## 2023-01-10 RX ADMIN — ACETAMINOPHEN 500 MG: 500 TABLET, FILM COATED ORAL at 20:28

## 2023-01-10 RX ADMIN — ACETAMINOPHEN 500 MG: 500 TABLET, FILM COATED ORAL at 15:36

## 2023-01-10 RX ADMIN — TAZOBACTAM SODIUM AND PIPERACILLIN SODIUM 3.38 G: 375; 3 INJECTION, SOLUTION INTRAVENOUS at 15:45

## 2023-01-10 RX ADMIN — SENNOSIDES AND DOCUSATE SODIUM 2 TABLET: 50; 8.6 TABLET ORAL at 09:48

## 2023-01-10 RX ADMIN — OMEPRAZOLE 20 MG: 20 CAPSULE, DELAYED RELEASE ORAL at 20:29

## 2023-01-10 RX ADMIN — APIXABAN 2.5 MG: 2.5 TABLET, FILM COATED ORAL at 09:46

## 2023-01-10 RX ADMIN — ACETAMINOPHEN 500 MG: 500 TABLET, FILM COATED ORAL at 06:32

## 2023-01-10 RX ADMIN — GABAPENTIN 100 MG: 100 CAPSULE ORAL at 09:46

## 2023-01-10 RX ADMIN — METOPROLOL SUCCINATE 50 MG: 50 TABLET, EXTENDED RELEASE ORAL at 09:46

## 2023-01-10 RX ADMIN — OMEPRAZOLE 20 MG: 20 CAPSULE, DELAYED RELEASE ORAL at 09:47

## 2023-01-10 RX ADMIN — ACETAMINOPHEN 500 MG: 500 TABLET, FILM COATED ORAL at 09:46

## 2023-01-10 RX ADMIN — ONDANSETRON 4 MG: 2 INJECTION INTRAMUSCULAR; INTRAVENOUS at 13:06

## 2023-01-10 RX ADMIN — VANCOMYCIN HYDROCHLORIDE 750 MG: 750 INJECTION, SOLUTION INTRAVENOUS at 10:11

## 2023-01-10 RX ADMIN — IPRATROPIUM BROMIDE AND ALBUTEROL SULFATE 3 ML: 2.5; .5 SOLUTION RESPIRATORY (INHALATION) at 08:51

## 2023-01-10 RX ADMIN — INSULIN LISPRO 3 UNITS: 100 INJECTION, SOLUTION INTRAVENOUS; SUBCUTANEOUS at 09:47

## 2023-01-10 RX ADMIN — SODIUM CHLORIDE 125 MG: 9 INJECTION, SOLUTION INTRAVENOUS at 20:35

## 2023-01-10 RX ADMIN — GABAPENTIN 100 MG: 100 CAPSULE ORAL at 15:36

## 2023-01-11 ENCOUNTER — APPOINTMENT (OUTPATIENT)
Dept: GENERAL RADIOLOGY | Facility: HOSPITAL | Age: 83
DRG: 917 | End: 2023-01-11
Payer: MEDICARE

## 2023-01-11 LAB
ALBUMIN SERPL-MCNC: 2.6 G/DL (ref 3.5–5.2)
ALBUMIN/GLOB SERPL: 0.9 G/DL
ALP SERPL-CCNC: 168 U/L (ref 39–117)
ALT SERPL W P-5'-P-CCNC: 24 U/L (ref 1–33)
ANION GAP SERPL CALCULATED.3IONS-SCNC: 13 MMOL/L (ref 5–15)
AST SERPL-CCNC: 26 U/L (ref 1–32)
BACTERIA SPEC RESP CULT: NORMAL
BILIRUB SERPL-MCNC: 0.4 MG/DL (ref 0–1.2)
BUN SERPL-MCNC: 75 MG/DL (ref 8–23)
BUN/CREAT SERPL: 34.2 (ref 7–25)
CALCIUM SPEC-SCNC: 8.3 MG/DL (ref 8.6–10.5)
CHLORIDE SERPL-SCNC: 101 MMOL/L (ref 98–107)
CK SERPL-CCNC: 48 U/L (ref 20–180)
CO2 SERPL-SCNC: 28 MMOL/L (ref 22–29)
CREAT SERPL-MCNC: 2.19 MG/DL (ref 0.57–1)
DEPRECATED RDW RBC AUTO: 55.8 FL (ref 37–54)
EGFRCR SERPLBLD CKD-EPI 2021: 21.9 ML/MIN/1.73
ERYTHROCYTE [DISTWIDTH] IN BLOOD BY AUTOMATED COUNT: 16.6 % (ref 12.3–15.4)
GLOBULIN UR ELPH-MCNC: 3 GM/DL
GLUCOSE BLDC GLUCOMTR-MCNC: 179 MG/DL (ref 70–130)
GLUCOSE BLDC GLUCOMTR-MCNC: 191 MG/DL (ref 70–130)
GLUCOSE BLDC GLUCOMTR-MCNC: 193 MG/DL (ref 70–130)
GLUCOSE BLDC GLUCOMTR-MCNC: 380 MG/DL (ref 70–130)
GLUCOSE SERPL-MCNC: 229 MG/DL (ref 65–99)
GRAM STN SPEC: NORMAL
HCT VFR BLD AUTO: 35.7 % (ref 34–46.6)
HGB BLD-MCNC: 10.1 G/DL (ref 12–15.9)
MCH RBC QN AUTO: 26.6 PG (ref 26.6–33)
MCHC RBC AUTO-ENTMCNC: 28.3 G/DL (ref 31.5–35.7)
MCV RBC AUTO: 93.9 FL (ref 79–97)
PHOSPHATE SERPL-MCNC: 5 MG/DL (ref 2.5–4.5)
PLATELET # BLD AUTO: 280 10*3/MM3 (ref 140–450)
PMV BLD AUTO: 10.5 FL (ref 6–12)
POTASSIUM SERPL-SCNC: 4.3 MMOL/L (ref 3.5–5.2)
PROT SERPL-MCNC: 5.6 G/DL (ref 6–8.5)
RBC # BLD AUTO: 3.8 10*6/MM3 (ref 3.77–5.28)
SODIUM SERPL-SCNC: 142 MMOL/L (ref 136–145)
WBC NRBC COR # BLD: 6.26 10*3/MM3 (ref 3.4–10.8)

## 2023-01-11 PROCEDURE — 63710000001 INSULIN LISPRO (HUMAN) PER 5 UNITS: Performed by: INTERNAL MEDICINE

## 2023-01-11 PROCEDURE — 85027 COMPLETE CBC AUTOMATED: CPT | Performed by: HOSPITALIST

## 2023-01-11 PROCEDURE — 97110 THERAPEUTIC EXERCISES: CPT

## 2023-01-11 PROCEDURE — 99232 SBSQ HOSP IP/OBS MODERATE 35: CPT | Performed by: PHYSICIAN ASSISTANT

## 2023-01-11 PROCEDURE — 25010000002 PIPERACILLIN SOD-TAZOBACTAM PER 1 G: Performed by: HOSPITALIST

## 2023-01-11 PROCEDURE — 97597 DBRDMT OPN WND 1ST 20 CM/<: CPT

## 2023-01-11 PROCEDURE — 84100 ASSAY OF PHOSPHORUS: CPT | Performed by: INTERNAL MEDICINE

## 2023-01-11 PROCEDURE — 82550 ASSAY OF CK (CPK): CPT | Performed by: INTERNAL MEDICINE

## 2023-01-11 PROCEDURE — 97116 GAIT TRAINING THERAPY: CPT

## 2023-01-11 PROCEDURE — 92526 ORAL FUNCTION THERAPY: CPT

## 2023-01-11 PROCEDURE — 94799 UNLISTED PULMONARY SVC/PX: CPT

## 2023-01-11 PROCEDURE — 25010000002 NA FERRIC GLUC CPLX PER 12.5 MG: Performed by: INTERNAL MEDICINE

## 2023-01-11 PROCEDURE — 80053 COMPREHEN METABOLIC PANEL: CPT | Performed by: HOSPITALIST

## 2023-01-11 PROCEDURE — 94761 N-INVAS EAR/PLS OXIMETRY MLT: CPT

## 2023-01-11 PROCEDURE — 99232 SBSQ HOSP IP/OBS MODERATE 35: CPT | Performed by: HOSPITALIST

## 2023-01-11 PROCEDURE — 97530 THERAPEUTIC ACTIVITIES: CPT

## 2023-01-11 PROCEDURE — 71045 X-RAY EXAM CHEST 1 VIEW: CPT

## 2023-01-11 PROCEDURE — 82962 GLUCOSE BLOOD TEST: CPT

## 2023-01-11 PROCEDURE — 63710000001 INSULIN DETEMIR PER 5 UNITS: Performed by: HOSPITALIST

## 2023-01-11 PROCEDURE — 94664 DEMO&/EVAL PT USE INHALER: CPT

## 2023-01-11 RX ADMIN — ACETAMINOPHEN 500 MG: 500 TABLET, FILM COATED ORAL at 08:47

## 2023-01-11 RX ADMIN — IPRATROPIUM BROMIDE AND ALBUTEROL SULFATE 3 ML: 2.5; .5 SOLUTION RESPIRATORY (INHALATION) at 19:05

## 2023-01-11 RX ADMIN — IPRATROPIUM BROMIDE AND ALBUTEROL SULFATE 3 ML: 2.5; .5 SOLUTION RESPIRATORY (INHALATION) at 12:45

## 2023-01-11 RX ADMIN — INSULIN LISPRO 2 UNITS: 100 INJECTION, SOLUTION INTRAVENOUS; SUBCUTANEOUS at 21:31

## 2023-01-11 RX ADMIN — METOPROLOL SUCCINATE 50 MG: 50 TABLET, EXTENDED RELEASE ORAL at 08:47

## 2023-01-11 RX ADMIN — APIXABAN 2.5 MG: 2.5 TABLET, FILM COATED ORAL at 21:32

## 2023-01-11 RX ADMIN — INSULIN LISPRO 2 UNITS: 100 INJECTION, SOLUTION INTRAVENOUS; SUBCUTANEOUS at 12:11

## 2023-01-11 RX ADMIN — INSULIN DETEMIR 20 UNITS: 100 INJECTION, SOLUTION SUBCUTANEOUS at 08:46

## 2023-01-11 RX ADMIN — ACETAMINOPHEN 500 MG: 500 TABLET, FILM COATED ORAL at 21:32

## 2023-01-11 RX ADMIN — APIXABAN 2.5 MG: 2.5 TABLET, FILM COATED ORAL at 08:46

## 2023-01-11 RX ADMIN — ACETAMINOPHEN 500 MG: 500 TABLET, FILM COATED ORAL at 17:37

## 2023-01-11 RX ADMIN — CLOPIDOGREL BISULFATE 75 MG: 75 TABLET ORAL at 08:47

## 2023-01-11 RX ADMIN — DIAZEPAM 2 MG: 2 TABLET ORAL at 12:12

## 2023-01-11 RX ADMIN — TAZOBACTAM SODIUM AND PIPERACILLIN SODIUM 3.38 G: 375; 3 INJECTION, SOLUTION INTRAVENOUS at 08:49

## 2023-01-11 RX ADMIN — BENZONATATE 100 MG: 100 CAPSULE ORAL at 12:11

## 2023-01-11 RX ADMIN — ATORVASTATIN CALCIUM 40 MG: 40 TABLET, FILM COATED ORAL at 08:47

## 2023-01-11 RX ADMIN — INSULIN LISPRO 2 UNITS: 100 INJECTION, SOLUTION INTRAVENOUS; SUBCUTANEOUS at 08:42

## 2023-01-11 RX ADMIN — OMEPRAZOLE 20 MG: 20 CAPSULE, DELAYED RELEASE ORAL at 08:42

## 2023-01-11 RX ADMIN — GABAPENTIN 100 MG: 100 CAPSULE ORAL at 08:47

## 2023-01-11 RX ADMIN — TAZOBACTAM SODIUM AND PIPERACILLIN SODIUM 3.38 G: 375; 3 INJECTION, SOLUTION INTRAVENOUS at 00:40

## 2023-01-11 RX ADMIN — INSULIN LISPRO 2 UNITS: 100 INJECTION, SOLUTION INTRAVENOUS; SUBCUTANEOUS at 17:36

## 2023-01-11 RX ADMIN — OMEPRAZOLE 20 MG: 20 CAPSULE, DELAYED RELEASE ORAL at 21:32

## 2023-01-11 RX ADMIN — IPRATROPIUM BROMIDE AND ALBUTEROL SULFATE 3 ML: 2.5; .5 SOLUTION RESPIRATORY (INHALATION) at 09:15

## 2023-01-11 RX ADMIN — DIAZEPAM 2 MG: 2 TABLET ORAL at 21:32

## 2023-01-11 RX ADMIN — TAZOBACTAM SODIUM AND PIPERACILLIN SODIUM 3.38 G: 375; 3 INJECTION, SOLUTION INTRAVENOUS at 19:00

## 2023-01-11 RX ADMIN — GABAPENTIN 100 MG: 100 CAPSULE ORAL at 21:32

## 2023-01-11 RX ADMIN — SODIUM CHLORIDE 125 MG: 9 INJECTION, SOLUTION INTRAVENOUS at 17:39

## 2023-01-11 RX ADMIN — SENNOSIDES AND DOCUSATE SODIUM 2 TABLET: 50; 8.6 TABLET ORAL at 21:32

## 2023-01-11 RX ADMIN — GABAPENTIN 100 MG: 100 CAPSULE ORAL at 17:37

## 2023-01-12 LAB
ANION GAP SERPL CALCULATED.3IONS-SCNC: 11 MMOL/L (ref 5–15)
BUN SERPL-MCNC: 86 MG/DL (ref 8–23)
BUN/CREAT SERPL: 29.2 (ref 7–25)
CALCIUM SPEC-SCNC: 8.1 MG/DL (ref 8.6–10.5)
CHLORIDE SERPL-SCNC: 103 MMOL/L (ref 98–107)
CO2 SERPL-SCNC: 29 MMOL/L (ref 22–29)
CREAT SERPL-MCNC: 2.95 MG/DL (ref 0.57–1)
EGFRCR SERPLBLD CKD-EPI 2021: 15.3 ML/MIN/1.73
GLUCOSE BLDC GLUCOMTR-MCNC: 172 MG/DL (ref 70–130)
GLUCOSE BLDC GLUCOMTR-MCNC: 188 MG/DL (ref 70–130)
GLUCOSE BLDC GLUCOMTR-MCNC: 212 MG/DL (ref 70–130)
GLUCOSE BLDC GLUCOMTR-MCNC: 237 MG/DL (ref 70–130)
GLUCOSE SERPL-MCNC: 231 MG/DL (ref 65–99)
POTASSIUM SERPL-SCNC: 4.4 MMOL/L (ref 3.5–5.2)
SODIUM SERPL-SCNC: 143 MMOL/L (ref 136–145)

## 2023-01-12 PROCEDURE — 94664 DEMO&/EVAL PT USE INHALER: CPT

## 2023-01-12 PROCEDURE — 82962 GLUCOSE BLOOD TEST: CPT

## 2023-01-12 PROCEDURE — 25010000002 PIPERACILLIN SOD-TAZOBACTAM PER 1 G: Performed by: HOSPITALIST

## 2023-01-12 PROCEDURE — 25010000002 ONDANSETRON PER 1 MG: Performed by: INTERNAL MEDICINE

## 2023-01-12 PROCEDURE — 99232 SBSQ HOSP IP/OBS MODERATE 35: CPT | Performed by: FAMILY MEDICINE

## 2023-01-12 PROCEDURE — 94799 UNLISTED PULMONARY SVC/PX: CPT

## 2023-01-12 PROCEDURE — 25010000002 NA FERRIC GLUC CPLX PER 12.5 MG: Performed by: INTERNAL MEDICINE

## 2023-01-12 PROCEDURE — 63710000001 INSULIN DETEMIR PER 5 UNITS: Performed by: HOSPITALIST

## 2023-01-12 PROCEDURE — 80048 BASIC METABOLIC PNL TOTAL CA: CPT | Performed by: FAMILY MEDICINE

## 2023-01-12 PROCEDURE — 94761 N-INVAS EAR/PLS OXIMETRY MLT: CPT

## 2023-01-12 PROCEDURE — 63710000001 INSULIN LISPRO (HUMAN) PER 5 UNITS: Performed by: INTERNAL MEDICINE

## 2023-01-12 RX ORDER — DEXTROMETHORPHAN POLISTIREX 30 MG/5ML
60 SUSPENSION ORAL 2 TIMES DAILY PRN
Status: DISCONTINUED | OUTPATIENT
Start: 2023-01-12 | End: 2023-01-20 | Stop reason: HOSPADM

## 2023-01-12 RX ORDER — SODIUM CHLORIDE 9 MG/ML
50 INJECTION, SOLUTION INTRAVENOUS CONTINUOUS
Status: DISCONTINUED | OUTPATIENT
Start: 2023-01-12 | End: 2023-01-13

## 2023-01-12 RX ADMIN — GABAPENTIN 100 MG: 100 CAPSULE ORAL at 08:14

## 2023-01-12 RX ADMIN — APIXABAN 2.5 MG: 2.5 TABLET, FILM COATED ORAL at 21:52

## 2023-01-12 RX ADMIN — SODIUM CHLORIDE 125 MG: 9 INJECTION, SOLUTION INTRAVENOUS at 16:25

## 2023-01-12 RX ADMIN — TAZOBACTAM SODIUM AND PIPERACILLIN SODIUM 3.38 G: 375; 3 INJECTION, SOLUTION INTRAVENOUS at 08:16

## 2023-01-12 RX ADMIN — IPRATROPIUM BROMIDE AND ALBUTEROL SULFATE 3 ML: 2.5; .5 SOLUTION RESPIRATORY (INHALATION) at 12:12

## 2023-01-12 RX ADMIN — ACETAMINOPHEN 500 MG: 500 TABLET, FILM COATED ORAL at 18:01

## 2023-01-12 RX ADMIN — ACETAMINOPHEN 500 MG: 500 TABLET, FILM COATED ORAL at 08:14

## 2023-01-12 RX ADMIN — INSULIN LISPRO 3 UNITS: 100 INJECTION, SOLUTION INTRAVENOUS; SUBCUTANEOUS at 11:58

## 2023-01-12 RX ADMIN — IPRATROPIUM BROMIDE AND ALBUTEROL SULFATE 3 ML: 2.5; .5 SOLUTION RESPIRATORY (INHALATION) at 20:08

## 2023-01-12 RX ADMIN — GABAPENTIN 100 MG: 100 CAPSULE ORAL at 21:52

## 2023-01-12 RX ADMIN — INSULIN LISPRO 2 UNITS: 100 INJECTION, SOLUTION INTRAVENOUS; SUBCUTANEOUS at 18:00

## 2023-01-12 RX ADMIN — TAZOBACTAM SODIUM AND PIPERACILLIN SODIUM 3.38 G: 375; 3 INJECTION, SOLUTION INTRAVENOUS at 00:15

## 2023-01-12 RX ADMIN — INSULIN DETEMIR 20 UNITS: 100 INJECTION, SOLUTION SUBCUTANEOUS at 08:13

## 2023-01-12 RX ADMIN — OMEPRAZOLE 20 MG: 20 CAPSULE, DELAYED RELEASE ORAL at 21:54

## 2023-01-12 RX ADMIN — ATORVASTATIN CALCIUM 40 MG: 40 TABLET, FILM COATED ORAL at 08:14

## 2023-01-12 RX ADMIN — OMEPRAZOLE 20 MG: 20 CAPSULE, DELAYED RELEASE ORAL at 08:14

## 2023-01-12 RX ADMIN — ACETAMINOPHEN 500 MG: 500 TABLET, FILM COATED ORAL at 21:52

## 2023-01-12 RX ADMIN — CLOPIDOGREL BISULFATE 75 MG: 75 TABLET ORAL at 08:14

## 2023-01-12 RX ADMIN — IPRATROPIUM BROMIDE AND ALBUTEROL SULFATE 3 ML: 2.5; .5 SOLUTION RESPIRATORY (INHALATION) at 16:35

## 2023-01-12 RX ADMIN — GABAPENTIN 100 MG: 100 CAPSULE ORAL at 18:01

## 2023-01-12 RX ADMIN — DIAZEPAM 2 MG: 2 TABLET ORAL at 23:26

## 2023-01-12 RX ADMIN — TAZOBACTAM SODIUM AND PIPERACILLIN SODIUM 3.38 G: 375; 3 INJECTION, SOLUTION INTRAVENOUS at 18:00

## 2023-01-12 RX ADMIN — INSULIN LISPRO 3 UNITS: 100 INJECTION, SOLUTION INTRAVENOUS; SUBCUTANEOUS at 21:51

## 2023-01-12 RX ADMIN — INSULIN LISPRO 2 UNITS: 100 INJECTION, SOLUTION INTRAVENOUS; SUBCUTANEOUS at 08:13

## 2023-01-12 RX ADMIN — TAZOBACTAM SODIUM AND PIPERACILLIN SODIUM 3.38 G: 375; 3 INJECTION, SOLUTION INTRAVENOUS at 23:26

## 2023-01-12 RX ADMIN — METOPROLOL SUCCINATE 50 MG: 50 TABLET, EXTENDED RELEASE ORAL at 08:14

## 2023-01-12 RX ADMIN — IPRATROPIUM BROMIDE AND ALBUTEROL SULFATE 3 ML: 2.5; .5 SOLUTION RESPIRATORY (INHALATION) at 08:52

## 2023-01-12 RX ADMIN — APIXABAN 2.5 MG: 2.5 TABLET, FILM COATED ORAL at 08:14

## 2023-01-12 RX ADMIN — DIAZEPAM 2 MG: 2 TABLET ORAL at 13:47

## 2023-01-12 RX ADMIN — SENNOSIDES AND DOCUSATE SODIUM 2 TABLET: 50; 8.6 TABLET ORAL at 08:14

## 2023-01-12 RX ADMIN — ONDANSETRON 4 MG: 2 INJECTION INTRAMUSCULAR; INTRAVENOUS at 11:12

## 2023-01-12 RX ADMIN — SODIUM CHLORIDE 100 ML/HR: 9 INJECTION, SOLUTION INTRAVENOUS at 16:06

## 2023-01-13 LAB
ALBUMIN SERPL-MCNC: 2.6 G/DL (ref 3.5–5.2)
ANION GAP SERPL CALCULATED.3IONS-SCNC: 12 MMOL/L (ref 5–15)
BUN SERPL-MCNC: 90 MG/DL (ref 8–23)
BUN/CREAT SERPL: 25.8 (ref 7–25)
CALCIUM SPEC-SCNC: 8 MG/DL (ref 8.6–10.5)
CHLORIDE SERPL-SCNC: 103 MMOL/L (ref 98–107)
CO2 SERPL-SCNC: 29 MMOL/L (ref 22–29)
CREAT SERPL-MCNC: 3.49 MG/DL (ref 0.57–1)
DEPRECATED RDW RBC AUTO: 56.7 FL (ref 37–54)
EGFRCR SERPLBLD CKD-EPI 2021: 12.5 ML/MIN/1.73
ERYTHROCYTE [DISTWIDTH] IN BLOOD BY AUTOMATED COUNT: 16.5 % (ref 12.3–15.4)
GLUCOSE BLDC GLUCOMTR-MCNC: 205 MG/DL (ref 70–130)
GLUCOSE BLDC GLUCOMTR-MCNC: 216 MG/DL (ref 70–130)
GLUCOSE BLDC GLUCOMTR-MCNC: 219 MG/DL (ref 70–130)
GLUCOSE BLDC GLUCOMTR-MCNC: 349 MG/DL (ref 70–130)
GLUCOSE SERPL-MCNC: 191 MG/DL (ref 65–99)
HCT VFR BLD AUTO: 34.1 % (ref 34–46.6)
HGB BLD-MCNC: 9.7 G/DL (ref 12–15.9)
MCH RBC QN AUTO: 26.9 PG (ref 26.6–33)
MCHC RBC AUTO-ENTMCNC: 28.4 G/DL (ref 31.5–35.7)
MCV RBC AUTO: 94.7 FL (ref 79–97)
PHOSPHATE SERPL-MCNC: 6.3 MG/DL (ref 2.5–4.5)
PLATELET # BLD AUTO: 303 10*3/MM3 (ref 140–450)
PMV BLD AUTO: 10 FL (ref 6–12)
POTASSIUM SERPL-SCNC: 4.1 MMOL/L (ref 3.5–5.2)
RBC # BLD AUTO: 3.6 10*6/MM3 (ref 3.77–5.28)
SODIUM SERPL-SCNC: 144 MMOL/L (ref 136–145)
WBC NRBC COR # BLD: 6.47 10*3/MM3 (ref 3.4–10.8)

## 2023-01-13 PROCEDURE — 82962 GLUCOSE BLOOD TEST: CPT

## 2023-01-13 PROCEDURE — 80069 RENAL FUNCTION PANEL: CPT | Performed by: FAMILY MEDICINE

## 2023-01-13 PROCEDURE — 99232 SBSQ HOSP IP/OBS MODERATE 35: CPT | Performed by: PHYSICIAN ASSISTANT

## 2023-01-13 PROCEDURE — 25010000002 PIPERACILLIN SOD-TAZOBACTAM PER 1 G: Performed by: HOSPITALIST

## 2023-01-13 PROCEDURE — 94799 UNLISTED PULMONARY SVC/PX: CPT

## 2023-01-13 PROCEDURE — 97602 WOUND(S) CARE NON-SELECTIVE: CPT

## 2023-01-13 PROCEDURE — 63710000001 INSULIN LISPRO (HUMAN) PER 5 UNITS: Performed by: INTERNAL MEDICINE

## 2023-01-13 PROCEDURE — 25010000002 NA FERRIC GLUC CPLX PER 12.5 MG: Performed by: INTERNAL MEDICINE

## 2023-01-13 PROCEDURE — 63710000001 INSULIN DETEMIR PER 5 UNITS: Performed by: HOSPITALIST

## 2023-01-13 PROCEDURE — 99232 SBSQ HOSP IP/OBS MODERATE 35: CPT | Performed by: FAMILY MEDICINE

## 2023-01-13 PROCEDURE — 85027 COMPLETE CBC AUTOMATED: CPT | Performed by: FAMILY MEDICINE

## 2023-01-13 RX ADMIN — GABAPENTIN 100 MG: 100 CAPSULE ORAL at 16:15

## 2023-01-13 RX ADMIN — INSULIN LISPRO 5 UNITS: 100 INJECTION, SOLUTION INTRAVENOUS; SUBCUTANEOUS at 13:36

## 2023-01-13 RX ADMIN — INSULIN LISPRO 3 UNITS: 100 INJECTION, SOLUTION INTRAVENOUS; SUBCUTANEOUS at 17:48

## 2023-01-13 RX ADMIN — IPRATROPIUM BROMIDE AND ALBUTEROL SULFATE 3 ML: 2.5; .5 SOLUTION RESPIRATORY (INHALATION) at 07:17

## 2023-01-13 RX ADMIN — INSULIN LISPRO 3 UNITS: 100 INJECTION, SOLUTION INTRAVENOUS; SUBCUTANEOUS at 23:10

## 2023-01-13 RX ADMIN — GABAPENTIN 100 MG: 100 CAPSULE ORAL at 20:36

## 2023-01-13 RX ADMIN — TAZOBACTAM SODIUM AND PIPERACILLIN SODIUM 3.38 G: 375; 3 INJECTION, SOLUTION INTRAVENOUS at 09:58

## 2023-01-13 RX ADMIN — ACETAMINOPHEN 500 MG: 500 TABLET, FILM COATED ORAL at 16:15

## 2023-01-13 RX ADMIN — DIAZEPAM 2 MG: 2 TABLET ORAL at 09:40

## 2023-01-13 RX ADMIN — METOPROLOL SUCCINATE 50 MG: 50 TABLET, EXTENDED RELEASE ORAL at 09:40

## 2023-01-13 RX ADMIN — ACETAMINOPHEN 500 MG: 500 TABLET, FILM COATED ORAL at 09:40

## 2023-01-13 RX ADMIN — GABAPENTIN 100 MG: 100 CAPSULE ORAL at 09:40

## 2023-01-13 RX ADMIN — IPRATROPIUM BROMIDE AND ALBUTEROL SULFATE 3 ML: 2.5; .5 SOLUTION RESPIRATORY (INHALATION) at 17:01

## 2023-01-13 RX ADMIN — ACETAMINOPHEN 500 MG: 500 TABLET, FILM COATED ORAL at 04:57

## 2023-01-13 RX ADMIN — APIXABAN 2.5 MG: 2.5 TABLET, FILM COATED ORAL at 09:41

## 2023-01-13 RX ADMIN — IPRATROPIUM BROMIDE AND ALBUTEROL SULFATE 3 ML: 2.5; .5 SOLUTION RESPIRATORY (INHALATION) at 20:02

## 2023-01-13 RX ADMIN — SENNOSIDES AND DOCUSATE SODIUM 2 TABLET: 50; 8.6 TABLET ORAL at 20:36

## 2023-01-13 RX ADMIN — ATORVASTATIN CALCIUM 40 MG: 40 TABLET, FILM COATED ORAL at 09:40

## 2023-01-13 RX ADMIN — CLOPIDOGREL BISULFATE 75 MG: 75 TABLET ORAL at 09:41

## 2023-01-13 RX ADMIN — OMEPRAZOLE 20 MG: 20 CAPSULE, DELAYED RELEASE ORAL at 20:37

## 2023-01-13 RX ADMIN — ACETAMINOPHEN 500 MG: 500 TABLET, FILM COATED ORAL at 23:10

## 2023-01-13 RX ADMIN — SODIUM CHLORIDE 125 MG: 9 INJECTION, SOLUTION INTRAVENOUS at 17:48

## 2023-01-13 RX ADMIN — MORPHINE SULFATE 5 MG: 100 SOLUTION ORAL at 11:15

## 2023-01-13 RX ADMIN — INSULIN DETEMIR 20 UNITS: 100 INJECTION, SOLUTION SUBCUTANEOUS at 09:40

## 2023-01-13 RX ADMIN — SODIUM CHLORIDE 100 ML/HR: 9 INJECTION, SOLUTION INTRAVENOUS at 03:59

## 2023-01-13 RX ADMIN — IPRATROPIUM BROMIDE AND ALBUTEROL SULFATE 3 ML: 2.5; .5 SOLUTION RESPIRATORY (INHALATION) at 12:08

## 2023-01-13 RX ADMIN — INSULIN LISPRO 3 UNITS: 100 INJECTION, SOLUTION INTRAVENOUS; SUBCUTANEOUS at 09:39

## 2023-01-13 RX ADMIN — APIXABAN 2.5 MG: 2.5 TABLET, FILM COATED ORAL at 20:36

## 2023-01-13 RX ADMIN — SENNOSIDES AND DOCUSATE SODIUM 2 TABLET: 50; 8.6 TABLET ORAL at 09:40

## 2023-01-14 ENCOUNTER — APPOINTMENT (OUTPATIENT)
Dept: GENERAL RADIOLOGY | Facility: HOSPITAL | Age: 83
DRG: 917 | End: 2023-01-14
Payer: MEDICARE

## 2023-01-14 LAB
ANION GAP SERPL CALCULATED.3IONS-SCNC: 16 MMOL/L (ref 5–15)
BUN SERPL-MCNC: 93 MG/DL (ref 8–23)
BUN/CREAT SERPL: 24.9 (ref 7–25)
CALCIUM SPEC-SCNC: 8.1 MG/DL (ref 8.6–10.5)
CHLORIDE SERPL-SCNC: 102 MMOL/L (ref 98–107)
CO2 SERPL-SCNC: 25 MMOL/L (ref 22–29)
CREAT SERPL-MCNC: 3.74 MG/DL (ref 0.57–1)
EGFRCR SERPLBLD CKD-EPI 2021: 11.5 ML/MIN/1.73
GLUCOSE BLDC GLUCOMTR-MCNC: 140 MG/DL (ref 70–130)
GLUCOSE BLDC GLUCOMTR-MCNC: 160 MG/DL (ref 70–130)
GLUCOSE BLDC GLUCOMTR-MCNC: 174 MG/DL (ref 70–130)
GLUCOSE BLDC GLUCOMTR-MCNC: 185 MG/DL (ref 70–130)
GLUCOSE SERPL-MCNC: 162 MG/DL (ref 65–99)
POTASSIUM SERPL-SCNC: 4.3 MMOL/L (ref 3.5–5.2)
SODIUM SERPL-SCNC: 143 MMOL/L (ref 136–145)

## 2023-01-14 PROCEDURE — 71045 X-RAY EXAM CHEST 1 VIEW: CPT

## 2023-01-14 PROCEDURE — 94799 UNLISTED PULMONARY SVC/PX: CPT

## 2023-01-14 PROCEDURE — 80048 BASIC METABOLIC PNL TOTAL CA: CPT | Performed by: FAMILY MEDICINE

## 2023-01-14 PROCEDURE — 99232 SBSQ HOSP IP/OBS MODERATE 35: CPT | Performed by: PHYSICIAN ASSISTANT

## 2023-01-14 PROCEDURE — 82962 GLUCOSE BLOOD TEST: CPT

## 2023-01-14 PROCEDURE — 63710000001 INSULIN DETEMIR PER 5 UNITS: Performed by: HOSPITALIST

## 2023-01-14 PROCEDURE — 63710000001 INSULIN LISPRO (HUMAN) PER 5 UNITS: Performed by: INTERNAL MEDICINE

## 2023-01-14 RX ORDER — HYDROMORPHONE HYDROCHLORIDE 1 MG/ML
0.25 INJECTION, SOLUTION INTRAMUSCULAR; INTRAVENOUS; SUBCUTANEOUS
Status: DISCONTINUED | OUTPATIENT
Start: 2023-01-14 | End: 2023-01-17

## 2023-01-14 RX ORDER — ALPRAZOLAM 0.5 MG/1
0.5 TABLET ORAL 3 TIMES DAILY PRN
Status: DISCONTINUED | OUTPATIENT
Start: 2023-01-14 | End: 2023-01-17

## 2023-01-14 RX ORDER — BUMETANIDE 0.25 MG/ML
3 INJECTION INTRAMUSCULAR; INTRAVENOUS ONCE
Status: COMPLETED | OUTPATIENT
Start: 2023-01-14 | End: 2023-01-14

## 2023-01-14 RX ADMIN — ACETAMINOPHEN 500 MG: 500 TABLET, FILM COATED ORAL at 20:33

## 2023-01-14 RX ADMIN — APIXABAN 2.5 MG: 2.5 TABLET, FILM COATED ORAL at 09:33

## 2023-01-14 RX ADMIN — GABAPENTIN 100 MG: 100 CAPSULE ORAL at 20:33

## 2023-01-14 RX ADMIN — GABAPENTIN 100 MG: 100 CAPSULE ORAL at 09:33

## 2023-01-14 RX ADMIN — DIAZEPAM 2 MG: 2 TABLET ORAL at 06:31

## 2023-01-14 RX ADMIN — INSULIN LISPRO 2 UNITS: 100 INJECTION, SOLUTION INTRAVENOUS; SUBCUTANEOUS at 11:57

## 2023-01-14 RX ADMIN — ACETAMINOPHEN 500 MG: 500 TABLET, FILM COATED ORAL at 05:53

## 2023-01-14 RX ADMIN — OMEPRAZOLE 20 MG: 20 CAPSULE, DELAYED RELEASE ORAL at 20:34

## 2023-01-14 RX ADMIN — GABAPENTIN 100 MG: 100 CAPSULE ORAL at 16:19

## 2023-01-14 RX ADMIN — METOPROLOL SUCCINATE 50 MG: 50 TABLET, EXTENDED RELEASE ORAL at 09:32

## 2023-01-14 RX ADMIN — IPRATROPIUM BROMIDE AND ALBUTEROL SULFATE 3 ML: 2.5; .5 SOLUTION RESPIRATORY (INHALATION) at 16:39

## 2023-01-14 RX ADMIN — INSULIN LISPRO 2 UNITS: 100 INJECTION, SOLUTION INTRAVENOUS; SUBCUTANEOUS at 09:33

## 2023-01-14 RX ADMIN — CLOPIDOGREL BISULFATE 75 MG: 75 TABLET ORAL at 09:32

## 2023-01-14 RX ADMIN — OMEPRAZOLE 20 MG: 20 CAPSULE, DELAYED RELEASE ORAL at 09:31

## 2023-01-14 RX ADMIN — INSULIN DETEMIR 20 UNITS: 100 INJECTION, SOLUTION SUBCUTANEOUS at 09:33

## 2023-01-14 RX ADMIN — BUMETANIDE 3 MG: 0.25 INJECTION INTRAMUSCULAR; INTRAVENOUS at 10:43

## 2023-01-14 RX ADMIN — IPRATROPIUM BROMIDE AND ALBUTEROL SULFATE 3 ML: 2.5; .5 SOLUTION RESPIRATORY (INHALATION) at 19:42

## 2023-01-14 RX ADMIN — ATORVASTATIN CALCIUM 40 MG: 40 TABLET, FILM COATED ORAL at 09:32

## 2023-01-14 RX ADMIN — IPRATROPIUM BROMIDE AND ALBUTEROL SULFATE 3 ML: 2.5; .5 SOLUTION RESPIRATORY (INHALATION) at 07:47

## 2023-01-14 RX ADMIN — ACETAMINOPHEN 500 MG: 500 TABLET, FILM COATED ORAL at 16:19

## 2023-01-14 RX ADMIN — APIXABAN 2.5 MG: 2.5 TABLET, FILM COATED ORAL at 20:33

## 2023-01-14 RX ADMIN — SENNOSIDES AND DOCUSATE SODIUM 2 TABLET: 50; 8.6 TABLET ORAL at 20:33

## 2023-01-14 RX ADMIN — ACETAMINOPHEN 500 MG: 500 TABLET, FILM COATED ORAL at 09:32

## 2023-01-15 ENCOUNTER — APPOINTMENT (OUTPATIENT)
Dept: NEPHROLOGY | Facility: HOSPITAL | Age: 83
DRG: 917 | End: 2023-01-15
Payer: MEDICARE

## 2023-01-15 ENCOUNTER — APPOINTMENT (OUTPATIENT)
Dept: GENERAL RADIOLOGY | Facility: HOSPITAL | Age: 83
DRG: 917 | End: 2023-01-15
Payer: MEDICARE

## 2023-01-15 LAB
ALBUMIN SERPL-MCNC: 2.8 G/DL (ref 3.5–5.2)
ANION GAP SERPL CALCULATED.3IONS-SCNC: 17 MMOL/L (ref 5–15)
BUN SERPL-MCNC: 104 MG/DL (ref 8–23)
BUN/CREAT SERPL: 23.6 (ref 7–25)
CALCIUM SPEC-SCNC: 8.6 MG/DL (ref 8.6–10.5)
CHLORIDE SERPL-SCNC: 103 MMOL/L (ref 98–107)
CO2 SERPL-SCNC: 24 MMOL/L (ref 22–29)
CREAT SERPL-MCNC: 4.4 MG/DL (ref 0.57–1)
DEPRECATED RDW RBC AUTO: 56.3 FL (ref 37–54)
EGFRCR SERPLBLD CKD-EPI 2021: 9.5 ML/MIN/1.73
ERYTHROCYTE [DISTWIDTH] IN BLOOD BY AUTOMATED COUNT: 16.8 % (ref 12.3–15.4)
GLUCOSE BLDC GLUCOMTR-MCNC: 148 MG/DL (ref 70–130)
GLUCOSE BLDC GLUCOMTR-MCNC: 148 MG/DL (ref 70–130)
GLUCOSE BLDC GLUCOMTR-MCNC: 173 MG/DL (ref 70–130)
GLUCOSE SERPL-MCNC: 183 MG/DL (ref 65–99)
HAV IGM SERPL QL IA: NORMAL
HBV CORE IGM SERPL QL IA: NORMAL
HBV SURFACE AG SERPL QL IA: NORMAL
HCT VFR BLD AUTO: 33.7 % (ref 34–46.6)
HCV AB SER DONR QL: NORMAL
HGB BLD-MCNC: 9.7 G/DL (ref 12–15.9)
MCH RBC QN AUTO: 27 PG (ref 26.6–33)
MCHC RBC AUTO-ENTMCNC: 28.8 G/DL (ref 31.5–35.7)
MCV RBC AUTO: 93.9 FL (ref 79–97)
PHOSPHATE SERPL-MCNC: 9 MG/DL (ref 2.5–4.5)
PLATELET # BLD AUTO: 307 10*3/MM3 (ref 140–450)
PMV BLD AUTO: 10.3 FL (ref 6–12)
POTASSIUM SERPL-SCNC: 4.5 MMOL/L (ref 3.5–5.2)
RBC # BLD AUTO: 3.59 10*6/MM3 (ref 3.77–5.28)
SODIUM SERPL-SCNC: 144 MMOL/L (ref 136–145)
WBC NRBC COR # BLD: 8.07 10*3/MM3 (ref 3.4–10.8)

## 2023-01-15 PROCEDURE — 36556 INSERT NON-TUNNEL CV CATH: CPT | Performed by: RADIOLOGY

## 2023-01-15 PROCEDURE — 97535 SELF CARE MNGMENT TRAINING: CPT

## 2023-01-15 PROCEDURE — 0 LIDOCAINE 1 % SOLUTION: Performed by: RADIOLOGY

## 2023-01-15 PROCEDURE — 25010000002 MIDAZOLAM PER 1 MG: Performed by: RADIOLOGY

## 2023-01-15 PROCEDURE — 80074 ACUTE HEPATITIS PANEL: CPT | Performed by: INTERNAL MEDICINE

## 2023-01-15 PROCEDURE — C1894 INTRO/SHEATH, NON-LASER: HCPCS | Performed by: RADIOLOGY

## 2023-01-15 PROCEDURE — 94761 N-INVAS EAR/PLS OXIMETRY MLT: CPT

## 2023-01-15 PROCEDURE — 85027 COMPLETE CBC AUTOMATED: CPT | Performed by: PHYSICIAN ASSISTANT

## 2023-01-15 PROCEDURE — 94799 UNLISTED PULMONARY SVC/PX: CPT

## 2023-01-15 PROCEDURE — 02HV33Z INSERTION OF INFUSION DEVICE INTO SUPERIOR VENA CAVA, PERCUTANEOUS APPROACH: ICD-10-PCS | Performed by: RADIOLOGY

## 2023-01-15 PROCEDURE — 80069 RENAL FUNCTION PANEL: CPT | Performed by: PHYSICIAN ASSISTANT

## 2023-01-15 PROCEDURE — 82962 GLUCOSE BLOOD TEST: CPT

## 2023-01-15 PROCEDURE — 99152 MOD SED SAME PHYS/QHP 5/>YRS: CPT | Performed by: RADIOLOGY

## 2023-01-15 PROCEDURE — 25010000002 FENTANYL CITRATE (PF) 50 MCG/ML SOLUTION: Performed by: RADIOLOGY

## 2023-01-15 PROCEDURE — 71045 X-RAY EXAM CHEST 1 VIEW: CPT

## 2023-01-15 PROCEDURE — 99153 MOD SED SAME PHYS/QHP EA: CPT | Performed by: RADIOLOGY

## 2023-01-15 PROCEDURE — 99231 SBSQ HOSP IP/OBS SF/LOW 25: CPT | Performed by: PHYSICIAN ASSISTANT

## 2023-01-15 PROCEDURE — C1752 CATH,HEMODIALYSIS,SHORT-TERM: HCPCS | Performed by: RADIOLOGY

## 2023-01-15 PROCEDURE — 5A1D70Z PERFORMANCE OF URINARY FILTRATION, INTERMITTENT, LESS THAN 6 HOURS PER DAY: ICD-10-PCS | Performed by: INTERNAL MEDICINE

## 2023-01-15 PROCEDURE — 97597 DBRDMT OPN WND 1ST 20 CM/<: CPT

## 2023-01-15 RX ORDER — LIDOCAINE HYDROCHLORIDE 10 MG/ML
INJECTION, SOLUTION INFILTRATION; PERINEURAL
Status: DISCONTINUED | OUTPATIENT
Start: 2023-01-15 | End: 2023-01-15 | Stop reason: HOSPADM

## 2023-01-15 RX ORDER — MIDAZOLAM HYDROCHLORIDE 1 MG/ML
INJECTION INTRAMUSCULAR; INTRAVENOUS
Status: DISCONTINUED | OUTPATIENT
Start: 2023-01-15 | End: 2023-01-15 | Stop reason: HOSPADM

## 2023-01-15 RX ORDER — ALBUMIN (HUMAN) 12.5 G/50ML
12.5 SOLUTION INTRAVENOUS AS NEEDED
Status: ACTIVE | OUTPATIENT
Start: 2023-01-15 | End: 2023-01-16

## 2023-01-15 RX ORDER — FENTANYL CITRATE 50 UG/ML
INJECTION, SOLUTION INTRAMUSCULAR; INTRAVENOUS
Status: DISCONTINUED | OUTPATIENT
Start: 2023-01-15 | End: 2023-01-15 | Stop reason: HOSPADM

## 2023-01-15 RX ORDER — HEPARIN SODIUM 1000 [USP'U]/ML
1200 INJECTION, SOLUTION INTRAVENOUS; SUBCUTANEOUS AS NEEDED
Status: DISCONTINUED | OUTPATIENT
Start: 2023-01-15 | End: 2023-01-20 | Stop reason: HOSPADM

## 2023-01-15 RX ADMIN — IPRATROPIUM BROMIDE AND ALBUTEROL SULFATE 3 ML: 2.5; .5 SOLUTION RESPIRATORY (INHALATION) at 07:11

## 2023-01-15 RX ADMIN — APIXABAN 2.5 MG: 2.5 TABLET, FILM COATED ORAL at 21:48

## 2023-01-15 RX ADMIN — ACETAMINOPHEN 500 MG: 500 TABLET, FILM COATED ORAL at 21:47

## 2023-01-15 RX ADMIN — OMEPRAZOLE 20 MG: 20 CAPSULE, DELAYED RELEASE ORAL at 21:48

## 2023-01-15 RX ADMIN — GABAPENTIN 100 MG: 100 CAPSULE ORAL at 21:48

## 2023-01-15 RX ADMIN — IPRATROPIUM BROMIDE AND ALBUTEROL SULFATE 3 ML: 2.5; .5 SOLUTION RESPIRATORY (INHALATION) at 11:26

## 2023-01-15 RX ADMIN — SENNOSIDES AND DOCUSATE SODIUM 2 TABLET: 50; 8.6 TABLET ORAL at 21:47

## 2023-01-16 ENCOUNTER — APPOINTMENT (OUTPATIENT)
Dept: NEPHROLOGY | Facility: HOSPITAL | Age: 83
DRG: 917 | End: 2023-01-16
Payer: MEDICARE

## 2023-01-16 ENCOUNTER — APPOINTMENT (OUTPATIENT)
Dept: ULTRASOUND IMAGING | Facility: HOSPITAL | Age: 83
DRG: 917 | End: 2023-01-16
Payer: MEDICARE

## 2023-01-16 LAB
ALBUMIN SERPL-MCNC: 2.9 G/DL (ref 3.5–5.2)
ANION GAP SERPL CALCULATED.3IONS-SCNC: 17 MMOL/L (ref 5–15)
BUN SERPL-MCNC: 67 MG/DL (ref 8–23)
BUN/CREAT SERPL: 22 (ref 7–25)
CALCIUM SPEC-SCNC: 8 MG/DL (ref 8.6–10.5)
CHLORIDE SERPL-SCNC: 100 MMOL/L (ref 98–107)
CO2 SERPL-SCNC: 23 MMOL/L (ref 22–29)
CREAT SERPL-MCNC: 3.04 MG/DL (ref 0.57–1)
DEPRECATED RDW RBC AUTO: 53.9 FL (ref 37–54)
EGFRCR SERPLBLD CKD-EPI 2021: 14.8 ML/MIN/1.73
ERYTHROCYTE [DISTWIDTH] IN BLOOD BY AUTOMATED COUNT: 17.2 % (ref 12.3–15.4)
GLUCOSE BLDC GLUCOMTR-MCNC: 181 MG/DL (ref 70–130)
GLUCOSE BLDC GLUCOMTR-MCNC: 215 MG/DL (ref 70–130)
GLUCOSE BLDC GLUCOMTR-MCNC: 236 MG/DL (ref 70–130)
GLUCOSE BLDC GLUCOMTR-MCNC: 296 MG/DL (ref 70–130)
GLUCOSE SERPL-MCNC: 213 MG/DL (ref 65–99)
HCT VFR BLD AUTO: 32.1 % (ref 34–46.6)
HGB BLD-MCNC: 9.4 G/DL (ref 12–15.9)
MCH RBC QN AUTO: 26.9 PG (ref 26.6–33)
MCHC RBC AUTO-ENTMCNC: 29.3 G/DL (ref 31.5–35.7)
MCV RBC AUTO: 91.7 FL (ref 79–97)
PHOSPHATE SERPL-MCNC: 6.2 MG/DL (ref 2.5–4.5)
PLATELET # BLD AUTO: 251 10*3/MM3 (ref 140–450)
PMV BLD AUTO: 10 FL (ref 6–12)
POTASSIUM SERPL-SCNC: 4.1 MMOL/L (ref 3.5–5.2)
RBC # BLD AUTO: 3.5 10*6/MM3 (ref 3.77–5.28)
SODIUM SERPL-SCNC: 140 MMOL/L (ref 136–145)
WBC NRBC COR # BLD: 7.81 10*3/MM3 (ref 3.4–10.8)

## 2023-01-16 PROCEDURE — 97535 SELF CARE MNGMENT TRAINING: CPT

## 2023-01-16 PROCEDURE — 63710000001 INSULIN LISPRO (HUMAN) PER 5 UNITS: Performed by: RADIOLOGY

## 2023-01-16 PROCEDURE — 80069 RENAL FUNCTION PANEL: CPT | Performed by: RADIOLOGY

## 2023-01-16 PROCEDURE — 99231 SBSQ HOSP IP/OBS SF/LOW 25: CPT | Performed by: PHYSICIAN ASSISTANT

## 2023-01-16 PROCEDURE — 94799 UNLISTED PULMONARY SVC/PX: CPT

## 2023-01-16 PROCEDURE — 63710000001 INSULIN DETEMIR PER 5 UNITS: Performed by: RADIOLOGY

## 2023-01-16 PROCEDURE — 97110 THERAPEUTIC EXERCISES: CPT

## 2023-01-16 PROCEDURE — 76775 US EXAM ABDO BACK WALL LIM: CPT

## 2023-01-16 PROCEDURE — 82962 GLUCOSE BLOOD TEST: CPT

## 2023-01-16 PROCEDURE — 85027 COMPLETE CBC AUTOMATED: CPT | Performed by: RADIOLOGY

## 2023-01-16 RX ORDER — IPRATROPIUM BROMIDE AND ALBUTEROL SULFATE 2.5; .5 MG/3ML; MG/3ML
3 SOLUTION RESPIRATORY (INHALATION) EVERY 4 HOURS PRN
Status: DISCONTINUED | OUTPATIENT
Start: 2023-01-16 | End: 2023-01-16 | Stop reason: SDUPTHER

## 2023-01-16 RX ADMIN — ACETAMINOPHEN 500 MG: 500 TABLET, FILM COATED ORAL at 04:38

## 2023-01-16 RX ADMIN — OMEPRAZOLE 20 MG: 20 CAPSULE, DELAYED RELEASE ORAL at 12:50

## 2023-01-16 RX ADMIN — INSULIN LISPRO 4 UNITS: 100 INJECTION, SOLUTION INTRAVENOUS; SUBCUTANEOUS at 22:24

## 2023-01-16 RX ADMIN — INSULIN LISPRO 3 UNITS: 100 INJECTION, SOLUTION INTRAVENOUS; SUBCUTANEOUS at 18:28

## 2023-01-16 RX ADMIN — INSULIN DETEMIR 12 UNITS: 100 INJECTION, SOLUTION SUBCUTANEOUS at 12:52

## 2023-01-16 RX ADMIN — METOPROLOL SUCCINATE 50 MG: 50 TABLET, EXTENDED RELEASE ORAL at 12:52

## 2023-01-16 RX ADMIN — GABAPENTIN 100 MG: 100 CAPSULE ORAL at 12:51

## 2023-01-16 RX ADMIN — CLOPIDOGREL BISULFATE 75 MG: 75 TABLET ORAL at 12:51

## 2023-01-16 RX ADMIN — IPRATROPIUM BROMIDE AND ALBUTEROL SULFATE 3 ML: 2.5; .5 SOLUTION RESPIRATORY (INHALATION) at 08:11

## 2023-01-16 RX ADMIN — GABAPENTIN 100 MG: 100 CAPSULE ORAL at 18:29

## 2023-01-16 RX ADMIN — HYDROXYZINE HYDROCHLORIDE 10 MG: 10 TABLET, FILM COATED ORAL at 22:16

## 2023-01-16 RX ADMIN — ACETAMINOPHEN 500 MG: 500 TABLET, FILM COATED ORAL at 22:12

## 2023-01-16 RX ADMIN — ACETAMINOPHEN 500 MG: 500 TABLET, FILM COATED ORAL at 18:29

## 2023-01-16 RX ADMIN — ATORVASTATIN CALCIUM 40 MG: 40 TABLET, FILM COATED ORAL at 12:52

## 2023-01-16 RX ADMIN — INSULIN LISPRO 2 UNITS: 100 INJECTION, SOLUTION INTRAVENOUS; SUBCUTANEOUS at 12:52

## 2023-01-16 RX ADMIN — GABAPENTIN 100 MG: 100 CAPSULE ORAL at 22:12

## 2023-01-16 RX ADMIN — OMEPRAZOLE 20 MG: 20 CAPSULE, DELAYED RELEASE ORAL at 22:12

## 2023-01-16 RX ADMIN — ACETAMINOPHEN 500 MG: 500 TABLET, FILM COATED ORAL at 12:50

## 2023-01-17 LAB
ALBUMIN SERPL-MCNC: 2.5 G/DL (ref 3.5–5.2)
ANION GAP SERPL CALCULATED.3IONS-SCNC: 8 MMOL/L (ref 5–15)
BUN SERPL-MCNC: 41 MG/DL (ref 8–23)
BUN/CREAT SERPL: 16.5 (ref 7–25)
CALCIUM SPEC-SCNC: 7.6 MG/DL (ref 8.6–10.5)
CHLORIDE SERPL-SCNC: 103 MMOL/L (ref 98–107)
CO2 SERPL-SCNC: 30 MMOL/L (ref 22–29)
CREAT SERPL-MCNC: 2.48 MG/DL (ref 0.57–1)
EGFRCR SERPLBLD CKD-EPI 2021: 18.8 ML/MIN/1.73
GLUCOSE BLDC GLUCOMTR-MCNC: 238 MG/DL (ref 70–130)
GLUCOSE BLDC GLUCOMTR-MCNC: 255 MG/DL (ref 70–130)
GLUCOSE BLDC GLUCOMTR-MCNC: 258 MG/DL (ref 70–130)
GLUCOSE BLDC GLUCOMTR-MCNC: 258 MG/DL (ref 70–130)
GLUCOSE SERPL-MCNC: 263 MG/DL (ref 65–99)
PHOSPHATE SERPL-MCNC: 3.9 MG/DL (ref 2.5–4.5)
POTASSIUM SERPL-SCNC: 3.2 MMOL/L (ref 3.5–5.2)
SODIUM SERPL-SCNC: 141 MMOL/L (ref 136–145)

## 2023-01-17 PROCEDURE — 80069 RENAL FUNCTION PANEL: CPT | Performed by: PHYSICIAN ASSISTANT

## 2023-01-17 PROCEDURE — 99232 SBSQ HOSP IP/OBS MODERATE 35: CPT | Performed by: PHYSICIAN ASSISTANT

## 2023-01-17 PROCEDURE — 97110 THERAPEUTIC EXERCISES: CPT

## 2023-01-17 PROCEDURE — 63710000001 INSULIN LISPRO (HUMAN) PER 5 UNITS: Performed by: RADIOLOGY

## 2023-01-17 PROCEDURE — 63710000001 INSULIN DETEMIR PER 5 UNITS: Performed by: RADIOLOGY

## 2023-01-17 PROCEDURE — 82962 GLUCOSE BLOOD TEST: CPT

## 2023-01-17 RX ORDER — ALPRAZOLAM 0.5 MG/1
0.5 TABLET ORAL NIGHTLY
Status: DISCONTINUED | OUTPATIENT
Start: 2023-01-17 | End: 2023-01-17

## 2023-01-17 RX ORDER — ACETAMINOPHEN 325 MG/1
650 TABLET ORAL EVERY 8 HOURS
Status: DISCONTINUED | OUTPATIENT
Start: 2023-01-17 | End: 2023-01-20 | Stop reason: HOSPADM

## 2023-01-17 RX ORDER — MANNITOL 250 MG/ML
25 INJECTION, SOLUTION INTRAVENOUS AS NEEDED
Status: ACTIVE | OUTPATIENT
Start: 2023-01-17 | End: 2023-01-18

## 2023-01-17 RX ORDER — POTASSIUM CHLORIDE 750 MG/1
20 CAPSULE, EXTENDED RELEASE ORAL ONCE
Status: COMPLETED | OUTPATIENT
Start: 2023-01-17 | End: 2023-01-17

## 2023-01-17 RX ORDER — ALPRAZOLAM 0.5 MG/1
0.5 TABLET ORAL 2 TIMES DAILY PRN
Status: DISCONTINUED | OUTPATIENT
Start: 2023-01-17 | End: 2023-01-20 | Stop reason: HOSPADM

## 2023-01-17 RX ORDER — ALPRAZOLAM 1 MG/1
1 TABLET ORAL NIGHTLY
Status: DISCONTINUED | OUTPATIENT
Start: 2023-01-17 | End: 2023-01-18

## 2023-01-17 RX ORDER — HYDROMORPHONE HYDROCHLORIDE 2 MG/1
1 TABLET ORAL EVERY 6 HOURS PRN
Status: DISCONTINUED | OUTPATIENT
Start: 2023-01-17 | End: 2023-01-19

## 2023-01-17 RX ADMIN — INSULIN LISPRO 3 UNITS: 100 INJECTION, SOLUTION INTRAVENOUS; SUBCUTANEOUS at 09:05

## 2023-01-17 RX ADMIN — INSULIN LISPRO 4 UNITS: 100 INJECTION, SOLUTION INTRAVENOUS; SUBCUTANEOUS at 11:35

## 2023-01-17 RX ADMIN — OMEPRAZOLE 20 MG: 20 CAPSULE, DELAYED RELEASE ORAL at 21:04

## 2023-01-17 RX ADMIN — METOPROLOL SUCCINATE 50 MG: 50 TABLET, EXTENDED RELEASE ORAL at 09:05

## 2023-01-17 RX ADMIN — ATORVASTATIN CALCIUM 40 MG: 40 TABLET, FILM COATED ORAL at 09:05

## 2023-01-17 RX ADMIN — INSULIN LISPRO 4 UNITS: 100 INJECTION, SOLUTION INTRAVENOUS; SUBCUTANEOUS at 18:02

## 2023-01-17 RX ADMIN — ACETAMINOPHEN 500 MG: 500 TABLET, FILM COATED ORAL at 09:05

## 2023-01-17 RX ADMIN — CLOPIDOGREL BISULFATE 75 MG: 75 TABLET ORAL at 09:05

## 2023-01-17 RX ADMIN — ACETAMINOPHEN 650 MG: 325 TABLET ORAL at 21:04

## 2023-01-17 RX ADMIN — INSULIN LISPRO 4 UNITS: 100 INJECTION, SOLUTION INTRAVENOUS; SUBCUTANEOUS at 21:04

## 2023-01-17 RX ADMIN — POTASSIUM CHLORIDE 20 MEQ: 750 CAPSULE, EXTENDED RELEASE ORAL at 11:35

## 2023-01-17 RX ADMIN — GABAPENTIN 100 MG: 100 CAPSULE ORAL at 16:16

## 2023-01-17 RX ADMIN — OMEPRAZOLE 20 MG: 20 CAPSULE, DELAYED RELEASE ORAL at 09:09

## 2023-01-17 RX ADMIN — GABAPENTIN 100 MG: 100 CAPSULE ORAL at 09:05

## 2023-01-17 RX ADMIN — INSULIN DETEMIR 12 UNITS: 100 INJECTION, SOLUTION SUBCUTANEOUS at 09:05

## 2023-01-17 RX ADMIN — GABAPENTIN 100 MG: 100 CAPSULE ORAL at 21:04

## 2023-01-17 RX ADMIN — SENNOSIDES AND DOCUSATE SODIUM 2 TABLET: 50; 8.6 TABLET ORAL at 09:05

## 2023-01-17 RX ADMIN — ACETAMINOPHEN 500 MG: 500 TABLET, FILM COATED ORAL at 04:41

## 2023-01-18 ENCOUNTER — APPOINTMENT (OUTPATIENT)
Dept: GENERAL RADIOLOGY | Facility: HOSPITAL | Age: 83
DRG: 917 | End: 2023-01-18
Payer: MEDICARE

## 2023-01-18 ENCOUNTER — APPOINTMENT (OUTPATIENT)
Dept: NEPHROLOGY | Facility: HOSPITAL | Age: 83
DRG: 917 | End: 2023-01-18
Payer: MEDICARE

## 2023-01-18 ENCOUNTER — APPOINTMENT (OUTPATIENT)
Dept: INTERVENTIONAL RADIOLOGY/VASCULAR | Facility: HOSPITAL | Age: 83
DRG: 917 | End: 2023-01-18
Payer: MEDICARE

## 2023-01-18 LAB
ALBUMIN SERPL-MCNC: 2.8 G/DL (ref 3.5–5.2)
AMMONIA BLD-SCNC: 31 UMOL/L (ref 11–51)
ANION GAP SERPL CALCULATED.3IONS-SCNC: 10 MMOL/L (ref 5–15)
BUN SERPL-MCNC: 45 MG/DL (ref 8–23)
BUN/CREAT SERPL: 15.2 (ref 7–25)
CALCIUM SPEC-SCNC: 8.1 MG/DL (ref 8.6–10.5)
CHLORIDE SERPL-SCNC: 102 MMOL/L (ref 98–107)
CO2 SERPL-SCNC: 29 MMOL/L (ref 22–29)
CREAT SERPL-MCNC: 2.97 MG/DL (ref 0.57–1)
DEPRECATED RDW RBC AUTO: 55.2 FL (ref 37–54)
EGFRCR SERPLBLD CKD-EPI 2021: 15.2 ML/MIN/1.73
ERYTHROCYTE [DISTWIDTH] IN BLOOD BY AUTOMATED COUNT: 17.6 % (ref 12.3–15.4)
GLUCOSE BLDC GLUCOMTR-MCNC: 162 MG/DL (ref 70–130)
GLUCOSE BLDC GLUCOMTR-MCNC: 319 MG/DL (ref 70–130)
GLUCOSE SERPL-MCNC: 324 MG/DL (ref 65–99)
HCT VFR BLD AUTO: 32.7 % (ref 34–46.6)
HGB BLD-MCNC: 9.6 G/DL (ref 12–15.9)
MAGNESIUM SERPL-MCNC: 2.1 MG/DL (ref 1.6–2.4)
MCH RBC QN AUTO: 27.4 PG (ref 26.6–33)
MCHC RBC AUTO-ENTMCNC: 29.4 G/DL (ref 31.5–35.7)
MCV RBC AUTO: 93.2 FL (ref 79–97)
PHOSPHATE SERPL-MCNC: 5.5 MG/DL (ref 2.5–4.5)
PLATELET # BLD AUTO: 225 10*3/MM3 (ref 140–450)
PMV BLD AUTO: 10.5 FL (ref 6–12)
POTASSIUM SERPL-SCNC: 3.9 MMOL/L (ref 3.5–5.2)
RBC # BLD AUTO: 3.51 10*6/MM3 (ref 3.77–5.28)
SODIUM SERPL-SCNC: 141 MMOL/L (ref 136–145)
WBC NRBC COR # BLD: 7.58 10*3/MM3 (ref 3.4–10.8)

## 2023-01-18 PROCEDURE — 82962 GLUCOSE BLOOD TEST: CPT

## 2023-01-18 PROCEDURE — 36558 INSERT TUNNELED CV CATH: CPT

## 2023-01-18 PROCEDURE — C1894 INTRO/SHEATH, NON-LASER: HCPCS

## 2023-01-18 PROCEDURE — C1750 CATH, HEMODIALYSIS,LONG-TERM: HCPCS

## 2023-01-18 PROCEDURE — 25010000002 HEPARIN (PORCINE) PER 1000 UNITS: Performed by: STUDENT IN AN ORGANIZED HEALTH CARE EDUCATION/TRAINING PROGRAM

## 2023-01-18 PROCEDURE — 76937 US GUIDE VASCULAR ACCESS: CPT

## 2023-01-18 PROCEDURE — 99152 MOD SED SAME PHYS/QHP 5/>YRS: CPT

## 2023-01-18 PROCEDURE — 85027 COMPLETE CBC AUTOMATED: CPT | Performed by: PHYSICIAN ASSISTANT

## 2023-01-18 PROCEDURE — 77001 FLUOROGUIDE FOR VEIN DEVICE: CPT

## 2023-01-18 PROCEDURE — 02HV33Z INSERTION OF INFUSION DEVICE INTO SUPERIOR VENA CAVA, PERCUTANEOUS APPROACH: ICD-10-PCS | Performed by: RADIOLOGY

## 2023-01-18 PROCEDURE — 71045 X-RAY EXAM CHEST 1 VIEW: CPT

## 2023-01-18 PROCEDURE — 25010000002 MIDAZOLAM PER 1MG: Performed by: RADIOLOGY

## 2023-01-18 PROCEDURE — 99232 SBSQ HOSP IP/OBS MODERATE 35: CPT | Performed by: PHYSICIAN ASSISTANT

## 2023-01-18 PROCEDURE — 0JH63XZ INSERTION OF TUNNELED VASCULAR ACCESS DEVICE INTO CHEST SUBCUTANEOUS TISSUE AND FASCIA, PERCUTANEOUS APPROACH: ICD-10-PCS | Performed by: RADIOLOGY

## 2023-01-18 PROCEDURE — 63710000001 INSULIN LISPRO (HUMAN) PER 5 UNITS: Performed by: RADIOLOGY

## 2023-01-18 PROCEDURE — 63710000001 INSULIN DETEMIR PER 5 UNITS: Performed by: PHYSICIAN ASSISTANT

## 2023-01-18 PROCEDURE — 83735 ASSAY OF MAGNESIUM: CPT | Performed by: PHYSICIAN ASSISTANT

## 2023-01-18 PROCEDURE — 82140 ASSAY OF AMMONIA: CPT | Performed by: PHYSICIAN ASSISTANT

## 2023-01-18 PROCEDURE — 80069 RENAL FUNCTION PANEL: CPT | Performed by: PHYSICIAN ASSISTANT

## 2023-01-18 RX ORDER — HEPARIN SODIUM 1000 [USP'U]/ML
4100 INJECTION, SOLUTION INTRAVENOUS; SUBCUTANEOUS ONCE
Status: COMPLETED | OUTPATIENT
Start: 2023-01-18 | End: 2023-01-18

## 2023-01-18 RX ORDER — LIDOCAINE HYDROCHLORIDE AND EPINEPHRINE 10; 10 MG/ML; UG/ML
5 INJECTION, SOLUTION INFILTRATION; PERINEURAL ONCE
Status: COMPLETED | OUTPATIENT
Start: 2023-01-18 | End: 2023-01-18

## 2023-01-18 RX ORDER — MIDAZOLAM HYDROCHLORIDE 1 MG/ML
INJECTION INTRAMUSCULAR; INTRAVENOUS
Status: DISPENSED
Start: 2023-01-18 | End: 2023-01-18

## 2023-01-18 RX ORDER — GABAPENTIN 100 MG/1
100 CAPSULE ORAL DAILY
Status: DISCONTINUED | OUTPATIENT
Start: 2023-01-18 | End: 2023-01-20 | Stop reason: HOSPADM

## 2023-01-18 RX ORDER — MIDAZOLAM HYDROCHLORIDE 2 MG/2ML
INJECTION, SOLUTION INTRAMUSCULAR; INTRAVENOUS AS NEEDED
Status: COMPLETED | OUTPATIENT
Start: 2023-01-18 | End: 2023-01-18

## 2023-01-18 RX ORDER — FENTANYL CITRATE 50 UG/ML
INJECTION, SOLUTION INTRAMUSCULAR; INTRAVENOUS
Status: DISCONTINUED
Start: 2023-01-18 | End: 2023-01-18 | Stop reason: WASHOUT

## 2023-01-18 RX ADMIN — GABAPENTIN 100 MG: 100 CAPSULE ORAL at 18:41

## 2023-01-18 RX ADMIN — INSULIN LISPRO 2 UNITS: 100 INJECTION, SOLUTION INTRAVENOUS; SUBCUTANEOUS at 18:41

## 2023-01-18 RX ADMIN — INSULIN DETEMIR 16 UNITS: 100 INJECTION, SOLUTION SUBCUTANEOUS at 08:32

## 2023-01-18 RX ADMIN — ACETAMINOPHEN 650 MG: 325 TABLET ORAL at 23:17

## 2023-01-18 RX ADMIN — HEPARIN SODIUM 4100 UNITS: 1000 INJECTION, SOLUTION INTRAVENOUS; SUBCUTANEOUS at 11:30

## 2023-01-18 RX ADMIN — INSULIN LISPRO 5 UNITS: 100 INJECTION, SOLUTION INTRAVENOUS; SUBCUTANEOUS at 08:32

## 2023-01-18 RX ADMIN — OMEPRAZOLE 20 MG: 20 CAPSULE, DELAYED RELEASE ORAL at 21:35

## 2023-01-18 RX ADMIN — MIDAZOLAM HYDROCHLORIDE 0.5 MG: 1 INJECTION, SOLUTION INTRAMUSCULAR; INTRAVENOUS at 10:52

## 2023-01-18 RX ADMIN — LIDOCAINE HYDROCHLORIDE,EPINEPHRINE BITARTRATE 5 ML: 10; .01 INJECTION, SOLUTION INFILTRATION; PERINEURAL at 11:32

## 2023-01-19 LAB
BACTERIA UR QL AUTO: ABNORMAL /HPF
BILIRUB UR QL STRIP: NEGATIVE
CLARITY UR: ABNORMAL
COLOR UR: YELLOW
GLUCOSE BLDC GLUCOMTR-MCNC: 206 MG/DL (ref 70–130)
GLUCOSE BLDC GLUCOMTR-MCNC: 289 MG/DL (ref 70–130)
GLUCOSE BLDC GLUCOMTR-MCNC: 295 MG/DL (ref 70–130)
GLUCOSE UR STRIP-MCNC: NEGATIVE MG/DL
HGB UR QL STRIP.AUTO: ABNORMAL
HYALINE CASTS UR QL AUTO: ABNORMAL /LPF
KETONES UR QL STRIP: ABNORMAL
LEUKOCYTE ESTERASE UR QL STRIP.AUTO: ABNORMAL
NITRITE UR QL STRIP: NEGATIVE
PH UR STRIP.AUTO: <=5 [PH] (ref 5–8)
PROT UR QL STRIP: ABNORMAL
RBC # UR STRIP: ABNORMAL /HPF
REF LAB TEST METHOD: ABNORMAL
SP GR UR STRIP: 1.02 (ref 1–1.03)
SQUAMOUS #/AREA URNS HPF: ABNORMAL /HPF
UROBILINOGEN UR QL STRIP: ABNORMAL
WBC # UR STRIP: ABNORMAL /HPF
YEAST URNS QL MICRO: ABNORMAL /HPF

## 2023-01-19 PROCEDURE — 99232 SBSQ HOSP IP/OBS MODERATE 35: CPT | Performed by: STUDENT IN AN ORGANIZED HEALTH CARE EDUCATION/TRAINING PROGRAM

## 2023-01-19 PROCEDURE — 82962 GLUCOSE BLOOD TEST: CPT

## 2023-01-19 PROCEDURE — 97530 THERAPEUTIC ACTIVITIES: CPT

## 2023-01-19 PROCEDURE — 63710000001 INSULIN LISPRO (HUMAN) PER 5 UNITS: Performed by: RADIOLOGY

## 2023-01-19 PROCEDURE — 81001 URINALYSIS AUTO W/SCOPE: CPT | Performed by: PHYSICIAN ASSISTANT

## 2023-01-19 PROCEDURE — 97116 GAIT TRAINING THERAPY: CPT

## 2023-01-19 PROCEDURE — 92610 EVALUATE SWALLOWING FUNCTION: CPT | Performed by: SPEECH-LANGUAGE PATHOLOGIST

## 2023-01-19 PROCEDURE — 63710000001 INSULIN DETEMIR PER 5 UNITS: Performed by: PHYSICIAN ASSISTANT

## 2023-01-19 PROCEDURE — 87086 URINE CULTURE/COLONY COUNT: CPT | Performed by: PHYSICIAN ASSISTANT

## 2023-01-19 RX ADMIN — APIXABAN 2.5 MG: 2.5 TABLET, FILM COATED ORAL at 21:25

## 2023-01-19 RX ADMIN — CLOPIDOGREL BISULFATE 75 MG: 75 TABLET ORAL at 10:32

## 2023-01-19 RX ADMIN — INSULIN LISPRO 4 UNITS: 100 INJECTION, SOLUTION INTRAVENOUS; SUBCUTANEOUS at 18:25

## 2023-01-19 RX ADMIN — INSULIN DETEMIR 20 UNITS: 100 INJECTION, SOLUTION SUBCUTANEOUS at 10:36

## 2023-01-19 RX ADMIN — ATORVASTATIN CALCIUM 40 MG: 40 TABLET, FILM COATED ORAL at 10:32

## 2023-01-19 RX ADMIN — OMEPRAZOLE 20 MG: 20 CAPSULE, DELAYED RELEASE ORAL at 10:33

## 2023-01-19 RX ADMIN — GABAPENTIN 100 MG: 100 CAPSULE ORAL at 10:32

## 2023-01-19 RX ADMIN — OMEPRAZOLE 20 MG: 20 CAPSULE, DELAYED RELEASE ORAL at 21:27

## 2023-01-19 RX ADMIN — INSULIN LISPRO 3 UNITS: 100 INJECTION, SOLUTION INTRAVENOUS; SUBCUTANEOUS at 10:35

## 2023-01-19 RX ADMIN — METOPROLOL SUCCINATE 50 MG: 50 TABLET, EXTENDED RELEASE ORAL at 10:32

## 2023-01-19 RX ADMIN — APIXABAN 2.5 MG: 2.5 TABLET, FILM COATED ORAL at 12:36

## 2023-01-19 RX ADMIN — SENNOSIDES AND DOCUSATE SODIUM 2 TABLET: 50; 8.6 TABLET ORAL at 21:25

## 2023-01-19 RX ADMIN — INSULIN LISPRO 4 UNITS: 100 INJECTION, SOLUTION INTRAVENOUS; SUBCUTANEOUS at 12:37

## 2023-01-19 RX ADMIN — ACETAMINOPHEN 650 MG: 325 TABLET ORAL at 21:25

## 2023-01-20 ENCOUNTER — APPOINTMENT (OUTPATIENT)
Dept: GENERAL RADIOLOGY | Facility: HOSPITAL | Age: 83
DRG: 917 | End: 2023-01-20
Payer: MEDICARE

## 2023-01-20 ENCOUNTER — READMISSION MANAGEMENT (OUTPATIENT)
Dept: CALL CENTER | Facility: HOSPITAL | Age: 83
End: 2023-01-20
Payer: MEDICARE

## 2023-01-20 VITALS
HEIGHT: 64 IN | SYSTOLIC BLOOD PRESSURE: 118 MMHG | TEMPERATURE: 97.2 F | DIASTOLIC BLOOD PRESSURE: 47 MMHG | WEIGHT: 181.66 LBS | OXYGEN SATURATION: 99 % | RESPIRATION RATE: 17 BRPM | BODY MASS INDEX: 31.01 KG/M2 | HEART RATE: 57 BPM

## 2023-01-20 PROBLEM — I46.9 CARDIAC ARREST: Status: RESOLVED | Noted: 2023-01-04 | Resolved: 2023-01-20

## 2023-01-20 LAB
ANION GAP SERPL CALCULATED.3IONS-SCNC: 11 MMOL/L (ref 5–15)
BACTERIA SPEC AEROBE CULT: ABNORMAL
BASOPHILS # BLD AUTO: 0.02 10*3/MM3 (ref 0–0.2)
BASOPHILS NFR BLD AUTO: 0.3 % (ref 0–1.5)
BUN SERPL-MCNC: 35 MG/DL (ref 8–23)
BUN/CREAT SERPL: 16.7 (ref 7–25)
CALCIUM SPEC-SCNC: 8.1 MG/DL (ref 8.6–10.5)
CHLORIDE SERPL-SCNC: 102 MMOL/L (ref 98–107)
CO2 SERPL-SCNC: 28 MMOL/L (ref 22–29)
CREAT SERPL-MCNC: 2.1 MG/DL (ref 0.57–1)
DEPRECATED RDW RBC AUTO: 57 FL (ref 37–54)
EGFRCR SERPLBLD CKD-EPI 2021: 23 ML/MIN/1.73
EOSINOPHIL # BLD AUTO: 0.05 10*3/MM3 (ref 0–0.4)
EOSINOPHIL NFR BLD AUTO: 0.8 % (ref 0.3–6.2)
ERYTHROCYTE [DISTWIDTH] IN BLOOD BY AUTOMATED COUNT: 18.9 % (ref 12.3–15.4)
GLUCOSE BLDC GLUCOMTR-MCNC: 129 MG/DL (ref 70–130)
GLUCOSE BLDC GLUCOMTR-MCNC: 265 MG/DL (ref 70–130)
GLUCOSE SERPL-MCNC: 202 MG/DL (ref 65–99)
HCT VFR BLD AUTO: 32.7 % (ref 34–46.6)
HGB BLD-MCNC: 9.5 G/DL (ref 12–15.9)
IMM GRANULOCYTES # BLD AUTO: 0.18 10*3/MM3 (ref 0–0.05)
IMM GRANULOCYTES NFR BLD AUTO: 2.9 % (ref 0–0.5)
LYMPHOCYTES # BLD AUTO: 0.96 10*3/MM3 (ref 0.7–3.1)
LYMPHOCYTES NFR BLD AUTO: 15.5 % (ref 19.6–45.3)
MCH RBC QN AUTO: 27.1 PG (ref 26.6–33)
MCHC RBC AUTO-ENTMCNC: 29.1 G/DL (ref 31.5–35.7)
MCV RBC AUTO: 93.4 FL (ref 79–97)
MONOCYTES # BLD AUTO: 0.75 10*3/MM3 (ref 0.1–0.9)
MONOCYTES NFR BLD AUTO: 12.1 % (ref 5–12)
NEUTROPHILS NFR BLD AUTO: 4.24 10*3/MM3 (ref 1.7–7)
NEUTROPHILS NFR BLD AUTO: 68.4 % (ref 42.7–76)
NRBC BLD AUTO-RTO: 0 /100 WBC (ref 0–0.2)
PLATELET # BLD AUTO: 195 10*3/MM3 (ref 140–450)
PMV BLD AUTO: 10.4 FL (ref 6–12)
POTASSIUM SERPL-SCNC: 4.2 MMOL/L (ref 3.5–5.2)
RBC # BLD AUTO: 3.5 10*6/MM3 (ref 3.77–5.28)
SODIUM SERPL-SCNC: 141 MMOL/L (ref 136–145)
WBC NRBC COR # BLD: 6.2 10*3/MM3 (ref 3.4–10.8)

## 2023-01-20 PROCEDURE — 74230 X-RAY XM SWLNG FUNCJ C+: CPT

## 2023-01-20 PROCEDURE — 92611 MOTION FLUOROSCOPY/SWALLOW: CPT

## 2023-01-20 PROCEDURE — 85025 COMPLETE CBC W/AUTO DIFF WBC: CPT | Performed by: STUDENT IN AN ORGANIZED HEALTH CARE EDUCATION/TRAINING PROGRAM

## 2023-01-20 PROCEDURE — 80048 BASIC METABOLIC PNL TOTAL CA: CPT | Performed by: STUDENT IN AN ORGANIZED HEALTH CARE EDUCATION/TRAINING PROGRAM

## 2023-01-20 PROCEDURE — 82962 GLUCOSE BLOOD TEST: CPT

## 2023-01-20 PROCEDURE — 63710000001 INSULIN LISPRO (HUMAN) PER 5 UNITS: Performed by: RADIOLOGY

## 2023-01-20 PROCEDURE — 63710000001 INSULIN DETEMIR PER 5 UNITS: Performed by: PHYSICIAN ASSISTANT

## 2023-01-20 PROCEDURE — 25010000002 ONDANSETRON PER 1 MG: Performed by: RADIOLOGY

## 2023-01-20 PROCEDURE — 99239 HOSP IP/OBS DSCHRG MGMT >30: CPT | Performed by: STUDENT IN AN ORGANIZED HEALTH CARE EDUCATION/TRAINING PROGRAM

## 2023-01-20 RX ORDER — FLUCONAZOLE 100 MG/1
150 TABLET ORAL ONCE
Status: COMPLETED | OUTPATIENT
Start: 2023-01-20 | End: 2023-01-20

## 2023-01-20 RX ORDER — NYSTATIN 100000 U/G
1 CREAM TOPICAL EVERY 12 HOURS SCHEDULED
Qty: 13 G | Refills: 0 | Status: SHIPPED | OUTPATIENT
Start: 2023-01-20 | End: 2023-02-02 | Stop reason: HOSPADM

## 2023-01-20 RX ORDER — METOPROLOL SUCCINATE 50 MG/1
50 TABLET, EXTENDED RELEASE ORAL
Qty: 30 TABLET | Refills: 0 | Status: SHIPPED | OUTPATIENT
Start: 2023-01-21 | End: 2023-02-02 | Stop reason: HOSPADM

## 2023-01-20 RX ORDER — NYSTATIN 100000 U/G
1 CREAM TOPICAL EVERY 12 HOURS SCHEDULED
Status: DISCONTINUED | OUTPATIENT
Start: 2023-01-20 | End: 2023-01-20 | Stop reason: HOSPADM

## 2023-01-20 RX ORDER — CLOPIDOGREL BISULFATE 75 MG/1
75 TABLET ORAL DAILY
Qty: 30 TABLET | Refills: 6 | Status: SHIPPED | OUTPATIENT
Start: 2023-01-20

## 2023-01-20 RX ADMIN — FLUCONAZOLE 150 MG: 100 TABLET ORAL at 12:36

## 2023-01-20 RX ADMIN — ALPRAZOLAM 0.5 MG: 0.5 TABLET ORAL at 02:24

## 2023-01-20 RX ADMIN — INSULIN LISPRO 4 UNITS: 100 INJECTION, SOLUTION INTRAVENOUS; SUBCUTANEOUS at 12:36

## 2023-01-20 RX ADMIN — GABAPENTIN 100 MG: 100 CAPSULE ORAL at 08:50

## 2023-01-20 RX ADMIN — INSULIN DETEMIR 20 UNITS: 100 INJECTION, SOLUTION SUBCUTANEOUS at 08:51

## 2023-01-20 RX ADMIN — SENNOSIDES AND DOCUSATE SODIUM 2 TABLET: 50; 8.6 TABLET ORAL at 08:50

## 2023-01-20 RX ADMIN — CLOPIDOGREL BISULFATE 75 MG: 75 TABLET ORAL at 08:50

## 2023-01-20 RX ADMIN — BARIUM SULFATE 20 ML: 400 PASTE ORAL at 10:41

## 2023-01-20 RX ADMIN — APIXABAN 2.5 MG: 2.5 TABLET, FILM COATED ORAL at 08:50

## 2023-01-20 RX ADMIN — BARIUM SULFATE 100 ML: 0.81 POWDER, FOR SUSPENSION ORAL at 10:41

## 2023-01-20 RX ADMIN — METOPROLOL SUCCINATE 50 MG: 50 TABLET, EXTENDED RELEASE ORAL at 08:50

## 2023-01-20 RX ADMIN — ATORVASTATIN CALCIUM 40 MG: 40 TABLET, FILM COATED ORAL at 08:50

## 2023-01-20 RX ADMIN — NYSTATIN 1 APPLICATION: 100000 CREAM TOPICAL at 12:36

## 2023-01-20 RX ADMIN — OMEPRAZOLE 20 MG: 20 CAPSULE, DELAYED RELEASE ORAL at 08:50

## 2023-01-20 RX ADMIN — ONDANSETRON 4 MG: 2 INJECTION INTRAMUSCULAR; INTRAVENOUS at 02:24

## 2023-01-20 NOTE — OUTREACH NOTE
Prep Survey    Flowsheet Row Responses   [facility] [place] patient discharged from? [place]   Is LACE score < 7 ? No   Eligibility [facility]   Date of Admission 01/03/23   Date of Discharge 01/20/23   Discharge Disposition Home-Health Care Sv   Discharge diagnosis Ulcer of esophagus without bleeding, Resp. Failure R/T CHF   Does the patient have one of the following disease processes/diagnoses(primary or secondary)? CHF   Does the patient have Home health ordered? Yes   What is the Home health agency?  [facility]    Is there a DME ordered? Yes   What DME was ordered? Pt will dialyze TTS at [facility] ([place] at 11am   Prep survey completed? Yes          IRINEO BRYANT - Registered Nurse

## 2023-01-23 ENCOUNTER — TRANSITIONAL CARE MANAGEMENT TELEPHONE ENCOUNTER (OUTPATIENT)
Dept: CALL CENTER | Facility: HOSPITAL | Age: 83
End: 2023-01-23
Payer: MEDICARE

## 2023-01-23 NOTE — OUTREACH NOTE
Call Center TCM Note    Flowsheet Row Responses   [facility] patient discharged from? [place]   Does the patient have one of the following disease processes/diagnoses(primary or secondary)? CHF   TCM attempt successful? Yes   Call start time 0953   Call end time 0958   Discharge diagnosis Ulcer of esophagus without bleeding, Resp. Failure R/T CHF   Is patient permission given to speak with other caregiver? Yes   List who call center can speak with Daughter- Katie   Person spoke with today (if not patient) and relationship Katie   Meds reviewed with patient/caregiver? Yes   Does the patient have all medications ordered at discharge? Yes   Prescription comments Daughter was unsure if patient still needs to take omeprazole- per hospital notes omeprazole  20 mg by mouth two times daily. daughter understands.   Is the patient taking all medications as directed (includes completed medication regime)? Yes   Comments Daughter is waiting on call from office for a Monday/ Wed. Video visit fu   Does the patient have an appointment with their PCP within 7 days of discharge? No   Nursing Interventions Assisted patient with making appointment per protocol   What is the Home health agency?  [facility]    Has home health visited the patient within 72 hours of discharge? No   Home health comments  has not reached out to family. [phone] ([facility])   What DME was ordered? Pt will dialyze TTS at [facility] at 11am- PT went to dialysis.   Pulse Ox monitoring None   Psychosocial issues? No   Did the patient receive a copy of their discharge instructions? Yes   Nursing interventions Reviewed instructions with patient   What is the patient's perception of their health status since discharge? Improving   Is the patient able to teach back signs and symptoms of worsening condition? (i.e. weight gain, shortness of air, etc.) Yes   Is the patient/caregiver able to teach back the hierarchy of who to  call/visit for symptoms/problems? PCP, Specialist, Home health nurse, Urgent Care, ED, 911 Yes   Is the patient able to teach back Heart Failure Zones? Yes   CHF Zone this Call Green Zone   Green Zone Patient reports doing well, No new or worsening shortness of breath, Weight check stable   Green Zone Interventions Daily weight check, Meds as directed   TCM call completed? Yes   Wrap up additional comments Per daughter patient is doing much better with no concerns or questions.   Call end time 0958   Would this patient benefit from a Referral to [facility] Social Work? No   Is the patient interested in additional calls from an ambulatory ?  NOTE:  applies to high risk patients requiring additional follow-up. No           Supriya Shaikh RN    1/23/2023, 10:02 EST

## 2023-01-24 DIAGNOSIS — G89.29 CHRONIC MIDLINE BACK PAIN, UNSPECIFIED BACK LOCATION: ICD-10-CM

## 2023-01-24 DIAGNOSIS — M54.9 CHRONIC MIDLINE BACK PAIN, UNSPECIFIED BACK LOCATION: ICD-10-CM

## 2023-01-25 RX ORDER — ONDANSETRON 4 MG/1
4 TABLET, ORALLY DISINTEGRATING ORAL EVERY 8 HOURS PRN
Qty: 14 TABLET | Refills: 0 | Status: SHIPPED | OUTPATIENT
Start: 2023-01-25

## 2023-01-25 RX ORDER — CYCLOBENZAPRINE HCL 5 MG
5 TABLET ORAL 3 TIMES DAILY PRN
Qty: 30 TABLET | Refills: 0 | Status: SHIPPED | OUTPATIENT
Start: 2023-01-25

## 2023-01-25 NOTE — TELEPHONE ENCOUNTER
Rx Refill Note  Requested Prescriptions     Pending Prescriptions Disp Refills   • ondansetron ODT (ZOFRAN-ODT) 4 MG disintegrating tablet 14 tablet 0     Sig: Place 1 tablet on the tongue Every 8 (Eight) Hours As Needed for Nausea or Vomiting.   • cyclobenzaprine (FLEXERIL) 5 MG tablet 30 tablet 0     Sig: Take 1 tablet by mouth 3 (Three) Times a Day As Needed for Muscle Spasms.      Last office visit with prescribing clinician: 10/25/2022   Last telemedicine visit with prescribing clinician: Visit date not found   Next office visit with prescribing clinician: Visit date not found                           Shaq Stovall MA  01/25/23, 08:26 EST

## 2023-01-28 ENCOUNTER — APPOINTMENT (OUTPATIENT)
Dept: CT IMAGING | Facility: HOSPITAL | Age: 83
DRG: 871 | End: 2023-01-28
Payer: MEDICARE

## 2023-01-28 ENCOUNTER — APPOINTMENT (OUTPATIENT)
Dept: GENERAL RADIOLOGY | Facility: HOSPITAL | Age: 83
DRG: 871 | End: 2023-01-28
Payer: MEDICARE

## 2023-01-28 ENCOUNTER — HOSPITAL ENCOUNTER (INPATIENT)
Facility: HOSPITAL | Age: 83
LOS: 5 days | Discharge: HOME HEALTH | DRG: 871 | End: 2023-02-02
Attending: EMERGENCY MEDICINE | Admitting: HOSPITALIST
Payer: MEDICARE

## 2023-01-28 DIAGNOSIS — I42.8 NICM (NONISCHEMIC CARDIOMYOPATHY): ICD-10-CM

## 2023-01-28 DIAGNOSIS — T50.905A ADVERSE EFFECT OF DRUG, INITIAL ENCOUNTER: ICD-10-CM

## 2023-01-28 DIAGNOSIS — N18.31 STAGE 3A CHRONIC KIDNEY DISEASE: ICD-10-CM

## 2023-01-28 DIAGNOSIS — E11.65 TYPE 2 DIABETES MELLITUS WITH HYPERGLYCEMIA, WITH LONG-TERM CURRENT USE OF INSULIN: ICD-10-CM

## 2023-01-28 DIAGNOSIS — R26.9 GAIT DISTURBANCE: ICD-10-CM

## 2023-01-28 DIAGNOSIS — E78.5 DYSLIPIDEMIA: Chronic | ICD-10-CM

## 2023-01-28 DIAGNOSIS — R53.81 PHYSICAL DECONDITIONING: ICD-10-CM

## 2023-01-28 DIAGNOSIS — I05.9 MITRAL VALVE DISEASE: ICD-10-CM

## 2023-01-28 DIAGNOSIS — M62.84 SARCOPENIA: ICD-10-CM

## 2023-01-28 DIAGNOSIS — K22.10 ULCER OF ESOPHAGUS WITHOUT BLEEDING: ICD-10-CM

## 2023-01-28 DIAGNOSIS — I73.9 PVD (PERIPHERAL VASCULAR DISEASE): ICD-10-CM

## 2023-01-28 DIAGNOSIS — I50.42 CHRONIC COMBINED SYSTOLIC AND DIASTOLIC HEART FAILURE: Chronic | ICD-10-CM

## 2023-01-28 DIAGNOSIS — R73.9 HYPERGLYCEMIA: ICD-10-CM

## 2023-01-28 DIAGNOSIS — R42 DIZZINESS: ICD-10-CM

## 2023-01-28 DIAGNOSIS — R53.1 WEAKNESS: ICD-10-CM

## 2023-01-28 DIAGNOSIS — I24.8 DEMAND ISCHEMIA: ICD-10-CM

## 2023-01-28 DIAGNOSIS — G62.9 NEUROPATHY: ICD-10-CM

## 2023-01-28 DIAGNOSIS — Z79.4 TYPE 2 DIABETES MELLITUS WITH HYPERGLYCEMIA, WITH LONG-TERM CURRENT USE OF INSULIN: ICD-10-CM

## 2023-01-28 DIAGNOSIS — R65.20 SEPSIS WITH ENCEPHALOPATHY WITHOUT SEPTIC SHOCK, DUE TO UNSPECIFIED ORGANISM: ICD-10-CM

## 2023-01-28 DIAGNOSIS — A41.9 SEPSIS WITH ENCEPHALOPATHY WITHOUT SEPTIC SHOCK, DUE TO UNSPECIFIED ORGANISM: ICD-10-CM

## 2023-01-28 DIAGNOSIS — M47.816 SPONDYLOSIS OF LUMBAR REGION WITHOUT MYELOPATHY OR RADICULOPATHY: ICD-10-CM

## 2023-01-28 DIAGNOSIS — E83.42 HYPOMAGNESEMIA: ICD-10-CM

## 2023-01-28 DIAGNOSIS — M51.37 DEGENERATION OF LUMBAR OR LUMBOSACRAL INTERVERTEBRAL DISC: ICD-10-CM

## 2023-01-28 DIAGNOSIS — M48.062 LUMBAR STENOSIS WITH NEUROGENIC CLAUDICATION: ICD-10-CM

## 2023-01-28 DIAGNOSIS — U07.1 COVID-19: Primary | ICD-10-CM

## 2023-01-28 DIAGNOSIS — T68.XXXA HYPOTHERMIA, INITIAL ENCOUNTER: ICD-10-CM

## 2023-01-28 DIAGNOSIS — D63.8 ANEMIA, CHRONIC DISEASE: Chronic | ICD-10-CM

## 2023-01-28 DIAGNOSIS — G93.40 SEPSIS WITH ENCEPHALOPATHY WITHOUT SEPTIC SHOCK, DUE TO UNSPECIFIED ORGANISM: ICD-10-CM

## 2023-01-28 DIAGNOSIS — M25.461 EFFUSION OF RIGHT KNEE: ICD-10-CM

## 2023-01-28 DIAGNOSIS — M43.16 SPONDYLOLISTHESIS, LUMBAR REGION: ICD-10-CM

## 2023-01-28 DIAGNOSIS — I48.0 PAROXYSMAL ATRIAL FIBRILLATION: Chronic | ICD-10-CM

## 2023-01-28 LAB
ALBUMIN SERPL-MCNC: 3.2 G/DL (ref 3.5–5.2)
ALBUMIN/GLOB SERPL: 1.2 G/DL
ALP SERPL-CCNC: 247 U/L (ref 39–117)
ALT SERPL W P-5'-P-CCNC: 30 U/L (ref 1–33)
AMORPH URATE CRY URNS QL MICRO: ABNORMAL /HPF
AMPHET+METHAMPHET UR QL: NEGATIVE
AMPHETAMINES UR QL: NEGATIVE
ANION GAP SERPL CALCULATED.3IONS-SCNC: 7 MMOL/L (ref 5–15)
ANISOCYTOSIS BLD QL: NORMAL
ARTERIAL PATENCY WRIST A: ABNORMAL
AST SERPL-CCNC: 45 U/L (ref 1–32)
ATMOSPHERIC PRESS: ABNORMAL MM[HG]
BACTERIA UR QL AUTO: ABNORMAL /HPF
BARBITURATES UR QL SCN: NEGATIVE
BASE EXCESS BLDA CALC-SCNC: 3 MMOL/L (ref 0–2)
BASOPHILS # BLD AUTO: 0.03 10*3/MM3 (ref 0–0.2)
BASOPHILS NFR BLD AUTO: 0.5 % (ref 0–1.5)
BDY SITE: ABNORMAL
BENZODIAZ UR QL SCN: POSITIVE
BILIRUB SERPL-MCNC: 0.7 MG/DL (ref 0–1.2)
BILIRUB UR QL STRIP: ABNORMAL
BODY TEMPERATURE: 37 C
BUN BLDA-MCNC: 28 MG/DL (ref 8–26)
BUN SERPL-MCNC: 24 MG/DL (ref 8–23)
BUN/CREAT SERPL: 12.6 (ref 7–25)
BUPRENORPHINE SERPL-MCNC: NEGATIVE NG/ML
CA-I BLDA-SCNC: 1.12 MMOL/L (ref 1.2–1.32)
CALCIUM SPEC-SCNC: 8.3 MG/DL (ref 8.6–10.5)
CANNABINOIDS SERPL QL: NEGATIVE
CHLORIDE BLDA-SCNC: 99 MMOL/L (ref 98–109)
CHLORIDE SERPL-SCNC: 101 MMOL/L (ref 98–107)
CLARITY UR: ABNORMAL
CO2 BLDA-SCNC: 29.5 MMOL/L (ref 22–33)
CO2 BLDA-SCNC: 30 MMOL/L (ref 24–29)
CO2 SERPL-SCNC: 30 MMOL/L (ref 22–29)
COCAINE UR QL: NEGATIVE
COHGB MFR BLD: 1.7 % (ref 0–2)
COLOR UR: ABNORMAL
CREAT BLDA-MCNC: 1.9 MG/DL (ref 0.6–1.3)
CREAT SERPL-MCNC: 1.91 MG/DL (ref 0.57–1)
CRP SERPL-MCNC: 1.8 MG/DL (ref 0–0.5)
D-LACTATE SERPL-SCNC: 1.2 MMOL/L (ref 0.5–2)
DEPRECATED RDW RBC AUTO: 76.4 FL (ref 37–54)
EGFRCR SERPLBLD CKD-EPI 2021: 25.8 ML/MIN/1.73
EGFRCR SERPLBLD CKD-EPI 2021: 25.9 ML/MIN/1.73
ELLIPTOCYTES BLD QL SMEAR: NORMAL
EOSINOPHIL # BLD AUTO: 0.02 10*3/MM3 (ref 0–0.4)
EOSINOPHIL NFR BLD AUTO: 0.3 % (ref 0.3–6.2)
EPAP: 0
ERYTHROCYTE [DISTWIDTH] IN BLOOD BY AUTOMATED COUNT: 22 % (ref 12.3–15.4)
ERYTHROCYTE [SEDIMENTATION RATE] IN BLOOD: 19 MM/HR (ref 0–30)
ETHANOL BLD-MCNC: <10 MG/DL (ref 0–10)
FLUAV RNA RESP QL NAA+PROBE: NOT DETECTED
FLUBV RNA RESP QL NAA+PROBE: NOT DETECTED
GLOBULIN UR ELPH-MCNC: 2.6 GM/DL
GLUCOSE BLDC GLUCOMTR-MCNC: 108 MG/DL (ref 70–130)
GLUCOSE BLDC GLUCOMTR-MCNC: 144 MG/DL (ref 70–130)
GLUCOSE BLDC GLUCOMTR-MCNC: 212 MG/DL (ref 70–130)
GLUCOSE SERPL-MCNC: 112 MG/DL (ref 65–99)
GLUCOSE UR STRIP-MCNC: ABNORMAL MG/DL
HCO3 BLDA-SCNC: 28.1 MMOL/L (ref 20–26)
HCT VFR BLD AUTO: 34.7 % (ref 34–46.6)
HCT VFR BLD CALC: 30.8 % (ref 38–51)
HCT VFR BLDA CALC: 34 % (ref 38–51)
HGB BLD-MCNC: 9.9 G/DL (ref 12–15.9)
HGB BLDA-MCNC: 10.1 G/DL (ref 14–18)
HGB BLDA-MCNC: 11.6 G/DL (ref 12–17)
HGB UR QL STRIP.AUTO: ABNORMAL
HOLD SPECIMEN: NORMAL
HYALINE CASTS UR QL AUTO: ABNORMAL /LPF
IMM GRANULOCYTES # BLD AUTO: 0.05 10*3/MM3 (ref 0–0.05)
IMM GRANULOCYTES NFR BLD AUTO: 0.9 % (ref 0–0.5)
INHALED O2 CONCENTRATION: 36 %
IPAP: 0
KETONES UR QL STRIP: ABNORMAL
LDH SERPL-CCNC: 486 U/L (ref 135–214)
LEUKOCYTE ESTERASE UR QL STRIP.AUTO: NEGATIVE
LYMPHOCYTES # BLD AUTO: 0.73 10*3/MM3 (ref 0.7–3.1)
LYMPHOCYTES NFR BLD AUTO: 12.7 % (ref 19.6–45.3)
MACROCYTES BLD QL SMEAR: NORMAL
MAGNESIUM SERPL-MCNC: 1.9 MG/DL (ref 1.6–2.4)
MCH RBC QN AUTO: 27.9 PG (ref 26.6–33)
MCHC RBC AUTO-ENTMCNC: 28.5 G/DL (ref 31.5–35.7)
MCV RBC AUTO: 97.7 FL (ref 79–97)
METHADONE UR QL SCN: NEGATIVE
METHGB BLD QL: 0.2 % (ref 0–1.5)
MODALITY: ABNORMAL
MONOCYTES # BLD AUTO: 0.59 10*3/MM3 (ref 0.1–0.9)
MONOCYTES NFR BLD AUTO: 10.2 % (ref 5–12)
NEUTROPHILS NFR BLD AUTO: 4.34 10*3/MM3 (ref 1.7–7)
NEUTROPHILS NFR BLD AUTO: 75.4 % (ref 42.7–76)
NITRITE UR QL STRIP: NEGATIVE
NOTE: ABNORMAL
NRBC BLD AUTO-RTO: 0 /100 WBC (ref 0–0.2)
OPIATES UR QL: POSITIVE
OXYCODONE UR QL SCN: NEGATIVE
OXYHGB MFR BLDV: 97.9 % (ref 94–99)
PAW @ PEAK INSP FLOW SETTING VENT: 0 CMH2O
PCO2 BLDA: 44.6 MM HG (ref 35–45)
PCO2 TEMP ADJ BLD: 44.6 MM HG (ref 35–45)
PCP UR QL SCN: NEGATIVE
PH BLDA: 7.41 PH UNITS (ref 7.35–7.45)
PH UR STRIP.AUTO: <=5 [PH] (ref 5–8)
PH, TEMP CORRECTED: 7.41 PH UNITS
PLAT MORPH BLD: NORMAL
PLATELET # BLD AUTO: 91 10*3/MM3 (ref 140–450)
PMV BLD AUTO: 10.7 FL (ref 6–12)
PO2 BLDA: 135 MM HG (ref 83–108)
PO2 TEMP ADJ BLD: 135 MM HG (ref 83–108)
POTASSIUM BLDA-SCNC: 5.1 MMOL/L (ref 3.5–4.9)
POTASSIUM SERPL-SCNC: 5.2 MMOL/L (ref 3.5–5.2)
PROPOXYPH UR QL: NEGATIVE
PROT SERPL-MCNC: 5.8 G/DL (ref 6–8.5)
PROT UR QL STRIP: ABNORMAL
RBC # BLD AUTO: 3.55 10*6/MM3 (ref 3.77–5.28)
RBC # UR STRIP: ABNORMAL /HPF
REF LAB TEST METHOD: ABNORMAL
SARS-COV-2 RNA RESP QL NAA+PROBE: DETECTED
SODIUM BLD-SCNC: 137 MMOL/L (ref 138–146)
SODIUM SERPL-SCNC: 138 MMOL/L (ref 136–145)
SP GR UR STRIP: 1.03 (ref 1–1.03)
SQUAMOUS #/AREA URNS HPF: ABNORMAL /HPF
TOTAL RATE: 0 BREATHS/MINUTE
TRICYCLICS UR QL SCN: POSITIVE
TROPONIN T SERPL-MCNC: 0.22 NG/ML (ref 0–0.03)
UROBILINOGEN UR QL STRIP: ABNORMAL
WBC # UR STRIP: ABNORMAL /HPF
WBC MORPH BLD: NORMAL
WBC NRBC COR # BLD: 5.76 10*3/MM3 (ref 3.4–10.8)
WHOLE BLOOD HOLD COAG: NORMAL
WHOLE BLOOD HOLD SPECIMEN: NORMAL
YEAST URNS QL MICRO: ABNORMAL /HPF

## 2023-01-28 PROCEDURE — 83735 ASSAY OF MAGNESIUM: CPT | Performed by: EMERGENCY MEDICINE

## 2023-01-28 PROCEDURE — 80047 BASIC METABLC PNL IONIZED CA: CPT

## 2023-01-28 PROCEDURE — 99223 1ST HOSP IP/OBS HIGH 75: CPT | Performed by: INTERNAL MEDICINE

## 2023-01-28 PROCEDURE — P9612 CATHETERIZE FOR URINE SPEC: HCPCS

## 2023-01-28 PROCEDURE — 86140 C-REACTIVE PROTEIN: CPT | Performed by: INTERNAL MEDICINE

## 2023-01-28 PROCEDURE — 70450 CT HEAD/BRAIN W/O DYE: CPT

## 2023-01-28 PROCEDURE — 93005 ELECTROCARDIOGRAM TRACING: CPT | Performed by: EMERGENCY MEDICINE

## 2023-01-28 PROCEDURE — 87636 SARSCOV2 & INF A&B AMP PRB: CPT | Performed by: EMERGENCY MEDICINE

## 2023-01-28 PROCEDURE — 85025 COMPLETE CBC W/AUTO DIFF WBC: CPT | Performed by: EMERGENCY MEDICINE

## 2023-01-28 PROCEDURE — 83050 HGB METHEMOGLOBIN QUAN: CPT

## 2023-01-28 PROCEDURE — 36600 WITHDRAWAL OF ARTERIAL BLOOD: CPT

## 2023-01-28 PROCEDURE — 25010000002 DEXAMETHASONE PER 1 MG: Performed by: INTERNAL MEDICINE

## 2023-01-28 PROCEDURE — 81001 URINALYSIS AUTO W/SCOPE: CPT | Performed by: EMERGENCY MEDICINE

## 2023-01-28 PROCEDURE — 85652 RBC SED RATE AUTOMATED: CPT | Performed by: INTERNAL MEDICINE

## 2023-01-28 PROCEDURE — 82962 GLUCOSE BLOOD TEST: CPT

## 2023-01-28 PROCEDURE — 82375 ASSAY CARBOXYHB QUANT: CPT

## 2023-01-28 PROCEDURE — 82077 ASSAY SPEC XCP UR&BREATH IA: CPT | Performed by: EMERGENCY MEDICINE

## 2023-01-28 PROCEDURE — 84484 ASSAY OF TROPONIN QUANT: CPT | Performed by: EMERGENCY MEDICINE

## 2023-01-28 PROCEDURE — 85014 HEMATOCRIT: CPT

## 2023-01-28 PROCEDURE — 82805 BLOOD GASES W/O2 SATURATION: CPT

## 2023-01-28 PROCEDURE — 80053 COMPREHEN METABOLIC PANEL: CPT | Performed by: EMERGENCY MEDICINE

## 2023-01-28 PROCEDURE — 71250 CT THORAX DX C-: CPT

## 2023-01-28 PROCEDURE — 80306 DRUG TEST PRSMV INSTRMNT: CPT | Performed by: EMERGENCY MEDICINE

## 2023-01-28 PROCEDURE — 83615 LACTATE (LD) (LDH) ENZYME: CPT | Performed by: INTERNAL MEDICINE

## 2023-01-28 PROCEDURE — 83605 ASSAY OF LACTIC ACID: CPT | Performed by: EMERGENCY MEDICINE

## 2023-01-28 PROCEDURE — 74176 CT ABD & PELVIS W/O CONTRAST: CPT

## 2023-01-28 PROCEDURE — 36415 COLL VENOUS BLD VENIPUNCTURE: CPT

## 2023-01-28 PROCEDURE — 63710000001 INSULIN DETEMIR PER 5 UNITS: Performed by: INTERNAL MEDICINE

## 2023-01-28 PROCEDURE — 87040 BLOOD CULTURE FOR BACTERIA: CPT | Performed by: EMERGENCY MEDICINE

## 2023-01-28 PROCEDURE — 99285 EMERGENCY DEPT VISIT HI MDM: CPT

## 2023-01-28 PROCEDURE — 71045 X-RAY EXAM CHEST 1 VIEW: CPT

## 2023-01-28 PROCEDURE — 85007 BL SMEAR W/DIFF WBC COUNT: CPT | Performed by: EMERGENCY MEDICINE

## 2023-01-28 RX ORDER — BISACODYL 10 MG
10 SUPPOSITORY, RECTAL RECTAL DAILY PRN
Status: DISCONTINUED | OUTPATIENT
Start: 2023-01-28 | End: 2023-02-02 | Stop reason: HOSPADM

## 2023-01-28 RX ORDER — SODIUM CHLORIDE 9 MG/ML
40 INJECTION, SOLUTION INTRAVENOUS AS NEEDED
Status: DISCONTINUED | OUTPATIENT
Start: 2023-01-28 | End: 2023-02-02 | Stop reason: HOSPADM

## 2023-01-28 RX ORDER — BUMETANIDE 1 MG/1
2 TABLET ORAL DAILY
Status: DISCONTINUED | OUTPATIENT
Start: 2023-01-28 | End: 2023-01-28

## 2023-01-28 RX ORDER — CLOTRIMAZOLE AND BETAMETHASONE DIPROPIONATE 10; .64 MG/G; MG/G
1 CREAM TOPICAL 2 TIMES DAILY
Status: DISCONTINUED | OUTPATIENT
Start: 2023-01-28 | End: 2023-02-02 | Stop reason: HOSPADM

## 2023-01-28 RX ORDER — ALPRAZOLAM 0.5 MG/1
0.5 TABLET ORAL 2 TIMES DAILY PRN
Status: DISCONTINUED | OUTPATIENT
Start: 2023-01-28 | End: 2023-01-30

## 2023-01-28 RX ORDER — ATORVASTATIN CALCIUM 40 MG/1
40 TABLET, FILM COATED ORAL NIGHTLY
Status: DISCONTINUED | OUTPATIENT
Start: 2023-01-28 | End: 2023-02-02 | Stop reason: HOSPADM

## 2023-01-28 RX ORDER — ACETAMINOPHEN 160 MG/5ML
650 SOLUTION ORAL EVERY 4 HOURS PRN
Status: DISCONTINUED | OUTPATIENT
Start: 2023-01-28 | End: 2023-02-02 | Stop reason: HOSPADM

## 2023-01-28 RX ORDER — CYCLOBENZAPRINE HCL 10 MG
5 TABLET ORAL 3 TIMES DAILY PRN
Status: DISCONTINUED | OUTPATIENT
Start: 2023-01-28 | End: 2023-02-02 | Stop reason: HOSPADM

## 2023-01-28 RX ORDER — SODIUM CHLORIDE 0.9 % (FLUSH) 0.9 %
10 SYRINGE (ML) INJECTION EVERY 12 HOURS SCHEDULED
Status: DISCONTINUED | OUTPATIENT
Start: 2023-01-28 | End: 2023-02-02 | Stop reason: HOSPADM

## 2023-01-28 RX ORDER — NICOTINE POLACRILEX 4 MG
15 LOZENGE BUCCAL
Status: DISCONTINUED | OUTPATIENT
Start: 2023-01-28 | End: 2023-02-02 | Stop reason: HOSPADM

## 2023-01-28 RX ORDER — ACETAMINOPHEN 325 MG/1
650 TABLET ORAL EVERY 4 HOURS PRN
Status: DISCONTINUED | OUTPATIENT
Start: 2023-01-28 | End: 2023-02-02 | Stop reason: HOSPADM

## 2023-01-28 RX ORDER — OMEPRAZOLE 20 MG/1
20 CAPSULE, DELAYED RELEASE ORAL 2 TIMES DAILY
Status: DISCONTINUED | OUTPATIENT
Start: 2023-01-28 | End: 2023-02-02 | Stop reason: HOSPADM

## 2023-01-28 RX ORDER — BUMETANIDE 0.25 MG/ML
1 INJECTION INTRAMUSCULAR; INTRAVENOUS ONCE
Status: COMPLETED | OUTPATIENT
Start: 2023-01-28 | End: 2023-01-28

## 2023-01-28 RX ORDER — CLOPIDOGREL BISULFATE 75 MG/1
75 TABLET ORAL DAILY
Status: DISCONTINUED | OUTPATIENT
Start: 2023-01-28 | End: 2023-02-02 | Stop reason: HOSPADM

## 2023-01-28 RX ORDER — SODIUM CHLORIDE 0.9 % (FLUSH) 0.9 %
10 SYRINGE (ML) INJECTION AS NEEDED
Status: DISCONTINUED | OUTPATIENT
Start: 2023-01-28 | End: 2023-02-02 | Stop reason: HOSPADM

## 2023-01-28 RX ORDER — ONDANSETRON 2 MG/ML
4 INJECTION INTRAMUSCULAR; INTRAVENOUS EVERY 6 HOURS PRN
Status: DISCONTINUED | OUTPATIENT
Start: 2023-01-28 | End: 2023-02-02 | Stop reason: HOSPADM

## 2023-01-28 RX ORDER — ACETAMINOPHEN 650 MG/1
650 SUPPOSITORY RECTAL EVERY 4 HOURS PRN
Status: DISCONTINUED | OUTPATIENT
Start: 2023-01-28 | End: 2023-02-02 | Stop reason: HOSPADM

## 2023-01-28 RX ORDER — INSULIN LISPRO 100 [IU]/ML
0-9 INJECTION, SOLUTION INTRAVENOUS; SUBCUTANEOUS
Status: DISCONTINUED | OUTPATIENT
Start: 2023-01-28 | End: 2023-02-02 | Stop reason: HOSPADM

## 2023-01-28 RX ORDER — DEXAMETHASONE SODIUM PHOSPHATE 4 MG/ML
6 INJECTION, SOLUTION INTRA-ARTICULAR; INTRALESIONAL; INTRAMUSCULAR; INTRAVENOUS; SOFT TISSUE
Status: DISCONTINUED | OUTPATIENT
Start: 2023-01-28 | End: 2023-02-01

## 2023-01-28 RX ORDER — HYDROCODONE BITARTRATE AND ACETAMINOPHEN 5; 325 MG/1; MG/1
1 TABLET ORAL EVERY 8 HOURS PRN
Status: DISCONTINUED | OUTPATIENT
Start: 2023-01-28 | End: 2023-02-02 | Stop reason: HOSPADM

## 2023-01-28 RX ORDER — DEXTROSE MONOHYDRATE 25 G/50ML
25 INJECTION, SOLUTION INTRAVENOUS
Status: DISCONTINUED | OUTPATIENT
Start: 2023-01-28 | End: 2023-02-02 | Stop reason: HOSPADM

## 2023-01-28 RX ORDER — POLYETHYLENE GLYCOL 3350 17 G/17G
17 POWDER, FOR SOLUTION ORAL DAILY PRN
Status: DISCONTINUED | OUTPATIENT
Start: 2023-01-28 | End: 2023-02-02 | Stop reason: HOSPADM

## 2023-01-28 RX ORDER — ONDANSETRON 4 MG/1
4 TABLET, FILM COATED ORAL EVERY 6 HOURS PRN
Status: DISCONTINUED | OUTPATIENT
Start: 2023-01-28 | End: 2023-02-02 | Stop reason: HOSPADM

## 2023-01-28 RX ORDER — AMOXICILLIN 250 MG
2 CAPSULE ORAL 2 TIMES DAILY
Status: DISCONTINUED | OUTPATIENT
Start: 2023-01-28 | End: 2023-02-02 | Stop reason: HOSPADM

## 2023-01-28 RX ORDER — GABAPENTIN 100 MG/1
100 CAPSULE ORAL 3 TIMES DAILY
Status: DISCONTINUED | OUTPATIENT
Start: 2023-01-28 | End: 2023-02-01

## 2023-01-28 RX ORDER — HYDROMORPHONE HYDROCHLORIDE 1 MG/ML
0.25 INJECTION, SOLUTION INTRAMUSCULAR; INTRAVENOUS; SUBCUTANEOUS EVERY 6 HOURS PRN
Status: DISCONTINUED | OUTPATIENT
Start: 2023-01-28 | End: 2023-01-30

## 2023-01-28 RX ORDER — BISACODYL 5 MG/1
5 TABLET, DELAYED RELEASE ORAL DAILY PRN
Status: DISCONTINUED | OUTPATIENT
Start: 2023-01-28 | End: 2023-02-02 | Stop reason: HOSPADM

## 2023-01-28 RX ADMIN — DEXAMETHASONE SODIUM PHOSPHATE 6 MG: 4 INJECTION INTRA-ARTICULAR; INTRALESIONAL; INTRAMUSCULAR; INTRAVENOUS; SOFT TISSUE at 16:35

## 2023-01-28 RX ADMIN — SENNOSIDES AND DOCUSATE SODIUM 2 TABLET: 50; 8.6 TABLET ORAL at 21:02

## 2023-01-28 RX ADMIN — INSULIN DETEMIR 10 UNITS: 100 INJECTION, SOLUTION SUBCUTANEOUS at 21:24

## 2023-01-28 RX ADMIN — ATORVASTATIN CALCIUM 40 MG: 40 TABLET, FILM COATED ORAL at 21:00

## 2023-01-28 RX ADMIN — BUMETANIDE 1 MG: 0.25 INJECTION INTRAMUSCULAR; INTRAVENOUS at 21:02

## 2023-01-28 RX ADMIN — CLOPIDOGREL BISULFATE 75 MG: 75 TABLET ORAL at 16:33

## 2023-01-28 RX ADMIN — CLOTRIMAZOLE AND BETAMETHASONE DIPROPIONATE 1 APPLICATION: 10; .5 CREAM TOPICAL at 21:03

## 2023-01-28 RX ADMIN — SODIUM CHLORIDE 1000 ML: 9 INJECTION, SOLUTION INTRAVENOUS at 12:59

## 2023-01-28 RX ADMIN — Medication 10 ML: at 21:05

## 2023-01-28 RX ADMIN — HYDROCODONE BITARTRATE AND ACETAMINOPHEN 1 TABLET: 5; 325 TABLET ORAL at 21:02

## 2023-01-28 RX ADMIN — APIXABAN 2.5 MG: 2.5 TABLET, FILM COATED ORAL at 21:00

## 2023-01-28 RX ADMIN — GABAPENTIN 100 MG: 100 CAPSULE ORAL at 21:02

## 2023-01-28 RX ADMIN — ALPRAZOLAM 0.5 MG: 0.5 TABLET ORAL at 21:22

## 2023-01-29 ENCOUNTER — READMISSION MANAGEMENT (OUTPATIENT)
Dept: CALL CENTER | Facility: HOSPITAL | Age: 83
End: 2023-01-29
Payer: MEDICARE

## 2023-01-29 LAB
ALBUMIN SERPL-MCNC: 3.3 G/DL (ref 3.5–5.2)
ALBUMIN/GLOB SERPL: 1.5 G/DL
ALP SERPL-CCNC: 244 U/L (ref 39–117)
ALT SERPL W P-5'-P-CCNC: 32 U/L (ref 1–33)
ANION GAP SERPL CALCULATED.3IONS-SCNC: 15 MMOL/L (ref 5–15)
AST SERPL-CCNC: 41 U/L (ref 1–32)
BASOPHILS # BLD AUTO: 0.01 10*3/MM3 (ref 0–0.2)
BASOPHILS NFR BLD AUTO: 0.2 % (ref 0–1.5)
BILIRUB SERPL-MCNC: 0.9 MG/DL (ref 0–1.2)
BUN SERPL-MCNC: 31 MG/DL (ref 8–23)
BUN/CREAT SERPL: 18.2 (ref 7–25)
CALCIUM SPEC-SCNC: 8.3 MG/DL (ref 8.6–10.5)
CHLORIDE SERPL-SCNC: 99 MMOL/L (ref 98–107)
CO2 SERPL-SCNC: 23 MMOL/L (ref 22–29)
CREAT SERPL-MCNC: 1.7 MG/DL (ref 0.57–1)
DEPRECATED RDW RBC AUTO: 77 FL (ref 37–54)
EGFRCR SERPLBLD CKD-EPI 2021: 29.6 ML/MIN/1.73
EOSINOPHIL # BLD AUTO: 0 10*3/MM3 (ref 0–0.4)
EOSINOPHIL NFR BLD AUTO: 0 % (ref 0.3–6.2)
ERYTHROCYTE [DISTWIDTH] IN BLOOD BY AUTOMATED COUNT: 22.3 % (ref 12.3–15.4)
GLOBULIN UR ELPH-MCNC: 2.2 GM/DL
GLUCOSE BLDC GLUCOMTR-MCNC: 283 MG/DL (ref 70–130)
GLUCOSE BLDC GLUCOMTR-MCNC: 349 MG/DL (ref 70–130)
GLUCOSE BLDC GLUCOMTR-MCNC: 355 MG/DL (ref 70–130)
GLUCOSE BLDC GLUCOMTR-MCNC: 373 MG/DL (ref 70–130)
GLUCOSE SERPL-MCNC: 343 MG/DL (ref 65–99)
HCT VFR BLD AUTO: 35.3 % (ref 34–46.6)
HGB BLD-MCNC: 10.2 G/DL (ref 12–15.9)
IMM GRANULOCYTES # BLD AUTO: 0.06 10*3/MM3 (ref 0–0.05)
IMM GRANULOCYTES NFR BLD AUTO: 1 % (ref 0–0.5)
LYMPHOCYTES # BLD AUTO: 0.47 10*3/MM3 (ref 0.7–3.1)
LYMPHOCYTES NFR BLD AUTO: 7.4 % (ref 19.6–45.3)
MCH RBC QN AUTO: 28 PG (ref 26.6–33)
MCHC RBC AUTO-ENTMCNC: 28.9 G/DL (ref 31.5–35.7)
MCV RBC AUTO: 97 FL (ref 79–97)
MONOCYTES # BLD AUTO: 0.15 10*3/MM3 (ref 0.1–0.9)
MONOCYTES NFR BLD AUTO: 2.4 % (ref 5–12)
NEUTROPHILS NFR BLD AUTO: 5.62 10*3/MM3 (ref 1.7–7)
NEUTROPHILS NFR BLD AUTO: 89 % (ref 42.7–76)
NRBC BLD AUTO-RTO: 0 /100 WBC (ref 0–0.2)
PLATELET # BLD AUTO: 118 10*3/MM3 (ref 140–450)
PMV BLD AUTO: 11.6 FL (ref 6–12)
POTASSIUM SERPL-SCNC: 5.4 MMOL/L (ref 3.5–5.2)
POTASSIUM SERPL-SCNC: 6 MMOL/L (ref 3.5–5.2)
POTASSIUM SERPL-SCNC: 6.4 MMOL/L (ref 3.5–5.2)
PROT SERPL-MCNC: 5.5 G/DL (ref 6–8.5)
QT INTERVAL: 406 MS
QTC INTERVAL: 456 MS
RBC # BLD AUTO: 3.64 10*6/MM3 (ref 3.77–5.28)
SODIUM SERPL-SCNC: 137 MMOL/L (ref 136–145)
WBC NRBC COR # BLD: 6.31 10*3/MM3 (ref 3.4–10.8)

## 2023-01-29 PROCEDURE — 82962 GLUCOSE BLOOD TEST: CPT

## 2023-01-29 PROCEDURE — 63710000001 INSULIN LISPRO (HUMAN) PER 5 UNITS: Performed by: INTERNAL MEDICINE

## 2023-01-29 PROCEDURE — 80053 COMPREHEN METABOLIC PANEL: CPT | Performed by: INTERNAL MEDICINE

## 2023-01-29 PROCEDURE — 63710000001 DEXAMETHASONE PER 0.25 MG: Performed by: INTERNAL MEDICINE

## 2023-01-29 PROCEDURE — 63710000001 INSULIN DETEMIR PER 5 UNITS: Performed by: INTERNAL MEDICINE

## 2023-01-29 PROCEDURE — 93010 ELECTROCARDIOGRAM REPORT: CPT | Performed by: INTERNAL MEDICINE

## 2023-01-29 PROCEDURE — 93005 ELECTROCARDIOGRAM TRACING: CPT | Performed by: STUDENT IN AN ORGANIZED HEALTH CARE EDUCATION/TRAINING PROGRAM

## 2023-01-29 PROCEDURE — 84132 ASSAY OF SERUM POTASSIUM: CPT | Performed by: STUDENT IN AN ORGANIZED HEALTH CARE EDUCATION/TRAINING PROGRAM

## 2023-01-29 PROCEDURE — 99232 SBSQ HOSP IP/OBS MODERATE 35: CPT | Performed by: STUDENT IN AN ORGANIZED HEALTH CARE EDUCATION/TRAINING PROGRAM

## 2023-01-29 PROCEDURE — 85025 COMPLETE CBC W/AUTO DIFF WBC: CPT | Performed by: INTERNAL MEDICINE

## 2023-01-29 RX ORDER — BUMETANIDE 0.25 MG/ML
1 INJECTION INTRAMUSCULAR; INTRAVENOUS EVERY 12 HOURS
Status: DISCONTINUED | OUTPATIENT
Start: 2023-01-29 | End: 2023-02-02

## 2023-01-29 RX ADMIN — DEXAMETHASONE 6 MG: 4 TABLET ORAL at 09:00

## 2023-01-29 RX ADMIN — APIXABAN 2.5 MG: 2.5 TABLET, FILM COATED ORAL at 20:14

## 2023-01-29 RX ADMIN — APIXABAN 2.5 MG: 2.5 TABLET, FILM COATED ORAL at 09:00

## 2023-01-29 RX ADMIN — ATORVASTATIN CALCIUM 40 MG: 40 TABLET, FILM COATED ORAL at 20:13

## 2023-01-29 RX ADMIN — GABAPENTIN 100 MG: 100 CAPSULE ORAL at 20:33

## 2023-01-29 RX ADMIN — SENNOSIDES AND DOCUSATE SODIUM 2 TABLET: 50; 8.6 TABLET ORAL at 20:14

## 2023-01-29 RX ADMIN — ACETAMINOPHEN 325MG 650 MG: 325 TABLET ORAL at 03:48

## 2023-01-29 RX ADMIN — BUMETANIDE 1 MG: 0.25 INJECTION INTRAMUSCULAR; INTRAVENOUS at 14:43

## 2023-01-29 RX ADMIN — CYCLOBENZAPRINE 5 MG: 10 TABLET, FILM COATED ORAL at 03:48

## 2023-01-29 RX ADMIN — ALPRAZOLAM 0.5 MG: 0.5 TABLET ORAL at 23:55

## 2023-01-29 RX ADMIN — Medication 10 ML: at 10:06

## 2023-01-29 RX ADMIN — INSULIN LISPRO 8 UNITS: 100 INJECTION, SOLUTION INTRAVENOUS; SUBCUTANEOUS at 09:00

## 2023-01-29 RX ADMIN — CLOPIDOGREL BISULFATE 75 MG: 75 TABLET ORAL at 09:00

## 2023-01-29 RX ADMIN — INSULIN LISPRO 8 UNITS: 100 INJECTION, SOLUTION INTRAVENOUS; SUBCUTANEOUS at 12:41

## 2023-01-29 RX ADMIN — SODIUM ZIRCONIUM CYCLOSILICATE 10 G: 10 POWDER, FOR SUSPENSION ORAL at 20:16

## 2023-01-29 RX ADMIN — CLOTRIMAZOLE AND BETAMETHASONE DIPROPIONATE 1 APPLICATION: 10; .5 CREAM TOPICAL at 20:14

## 2023-01-29 RX ADMIN — OMEPRAZOLE 20 MG: 20 CAPSULE, DELAYED RELEASE ORAL at 20:14

## 2023-01-29 RX ADMIN — SODIUM ZIRCONIUM CYCLOSILICATE 10 G: 10 POWDER, FOR SUSPENSION ORAL at 10:47

## 2023-01-29 RX ADMIN — INSULIN LISPRO 7 UNITS: 100 INJECTION, SOLUTION INTRAVENOUS; SUBCUTANEOUS at 17:02

## 2023-01-29 RX ADMIN — INSULIN DETEMIR 10 UNITS: 100 INJECTION, SOLUTION SUBCUTANEOUS at 20:33

## 2023-01-29 RX ADMIN — GABAPENTIN 100 MG: 100 CAPSULE ORAL at 09:00

## 2023-01-29 RX ADMIN — Medication 10 ML: at 20:15

## 2023-01-29 RX ADMIN — CLOTRIMAZOLE AND BETAMETHASONE DIPROPIONATE 1 APPLICATION: 10; .5 CREAM TOPICAL at 09:00

## 2023-01-29 RX ADMIN — GABAPENTIN 100 MG: 100 CAPSULE ORAL at 17:03

## 2023-01-29 RX ADMIN — OMEPRAZOLE 20 MG: 20 CAPSULE, DELAYED RELEASE ORAL at 09:00

## 2023-01-29 RX ADMIN — SODIUM ZIRCONIUM CYCLOSILICATE 10 G: 10 POWDER, FOR SUSPENSION ORAL at 17:03

## 2023-01-29 NOTE — OUTREACH NOTE
CHF Week 2 Survey    Flowsheet Row Responses   [facility] facility patient discharged from? [place]   Does the patient have one of the following disease processes/diagnoses(primary or secondary)? CHF   Week 2 attempt successful? No   Unsuccessful attempts Attempt 1   Revoke Readmitted          ROSA M BRYANT - Registered Nurse

## 2023-01-30 LAB
ANION GAP SERPL CALCULATED.3IONS-SCNC: 8 MMOL/L (ref 5–15)
BASOPHILS # BLD AUTO: 0 10*3/MM3 (ref 0–0.2)
BASOPHILS NFR BLD AUTO: 0 % (ref 0–1.5)
BUN SERPL-MCNC: 38 MG/DL (ref 8–23)
BUN/CREAT SERPL: 19.4 (ref 7–25)
CALCIUM SPEC-SCNC: 8.4 MG/DL (ref 8.6–10.5)
CHLORIDE SERPL-SCNC: 100 MMOL/L (ref 98–107)
CO2 SERPL-SCNC: 28 MMOL/L (ref 22–29)
CREAT SERPL-MCNC: 1.96 MG/DL (ref 0.57–1)
DEPRECATED RDW RBC AUTO: 76.8 FL (ref 37–54)
EGFRCR SERPLBLD CKD-EPI 2021: 25 ML/MIN/1.73
EOSINOPHIL # BLD AUTO: 0 10*3/MM3 (ref 0–0.4)
EOSINOPHIL NFR BLD AUTO: 0 % (ref 0.3–6.2)
ERYTHROCYTE [DISTWIDTH] IN BLOOD BY AUTOMATED COUNT: 22.2 % (ref 12.3–15.4)
GLUCOSE BLDC GLUCOMTR-MCNC: 205 MG/DL (ref 70–130)
GLUCOSE BLDC GLUCOMTR-MCNC: 268 MG/DL (ref 70–130)
GLUCOSE BLDC GLUCOMTR-MCNC: 336 MG/DL (ref 70–130)
GLUCOSE BLDC GLUCOMTR-MCNC: 417 MG/DL (ref 70–130)
GLUCOSE BLDC GLUCOMTR-MCNC: 422 MG/DL (ref 70–130)
GLUCOSE SERPL-MCNC: 400 MG/DL (ref 65–99)
HCT VFR BLD AUTO: 31.6 % (ref 34–46.6)
HGB BLD-MCNC: 9.2 G/DL (ref 12–15.9)
IMM GRANULOCYTES # BLD AUTO: 0.1 10*3/MM3 (ref 0–0.05)
IMM GRANULOCYTES NFR BLD AUTO: 1.2 % (ref 0–0.5)
LYMPHOCYTES # BLD AUTO: 0.47 10*3/MM3 (ref 0.7–3.1)
LYMPHOCYTES NFR BLD AUTO: 5.8 % (ref 19.6–45.3)
MCH RBC QN AUTO: 28.4 PG (ref 26.6–33)
MCHC RBC AUTO-ENTMCNC: 29.1 G/DL (ref 31.5–35.7)
MCV RBC AUTO: 97.5 FL (ref 79–97)
MONOCYTES # BLD AUTO: 0.44 10*3/MM3 (ref 0.1–0.9)
MONOCYTES NFR BLD AUTO: 5.5 % (ref 5–12)
NEUTROPHILS NFR BLD AUTO: 7.03 10*3/MM3 (ref 1.7–7)
NEUTROPHILS NFR BLD AUTO: 87.5 % (ref 42.7–76)
NRBC BLD AUTO-RTO: 0.2 /100 WBC (ref 0–0.2)
PLATELET # BLD AUTO: 133 10*3/MM3 (ref 140–450)
PMV BLD AUTO: 11 FL (ref 6–12)
POTASSIUM SERPL-SCNC: 5.4 MMOL/L (ref 3.5–5.2)
RBC # BLD AUTO: 3.24 10*6/MM3 (ref 3.77–5.28)
SODIUM SERPL-SCNC: 136 MMOL/L (ref 136–145)
T4 FREE SERPL-MCNC: 0.96 NG/DL (ref 0.93–1.7)
TSH SERPL DL<=0.05 MIU/L-ACNC: 0.48 UIU/ML (ref 0.27–4.2)
WBC NRBC COR # BLD: 8.04 10*3/MM3 (ref 3.4–10.8)

## 2023-01-30 PROCEDURE — 84443 ASSAY THYROID STIM HORMONE: CPT | Performed by: STUDENT IN AN ORGANIZED HEALTH CARE EDUCATION/TRAINING PROGRAM

## 2023-01-30 PROCEDURE — 80048 BASIC METABOLIC PNL TOTAL CA: CPT | Performed by: STUDENT IN AN ORGANIZED HEALTH CARE EDUCATION/TRAINING PROGRAM

## 2023-01-30 PROCEDURE — 84439 ASSAY OF FREE THYROXINE: CPT | Performed by: STUDENT IN AN ORGANIZED HEALTH CARE EDUCATION/TRAINING PROGRAM

## 2023-01-30 PROCEDURE — 25010000002 DEXAMETHASONE PER 1 MG: Performed by: INTERNAL MEDICINE

## 2023-01-30 PROCEDURE — 99232 SBSQ HOSP IP/OBS MODERATE 35: CPT | Performed by: STUDENT IN AN ORGANIZED HEALTH CARE EDUCATION/TRAINING PROGRAM

## 2023-01-30 PROCEDURE — 63710000001 INSULIN LISPRO (HUMAN) PER 5 UNITS: Performed by: INTERNAL MEDICINE

## 2023-01-30 PROCEDURE — 85025 COMPLETE CBC W/AUTO DIFF WBC: CPT | Performed by: STUDENT IN AN ORGANIZED HEALTH CARE EDUCATION/TRAINING PROGRAM

## 2023-01-30 PROCEDURE — 63710000001 INSULIN DETEMIR PER 5 UNITS: Performed by: STUDENT IN AN ORGANIZED HEALTH CARE EDUCATION/TRAINING PROGRAM

## 2023-01-30 PROCEDURE — 82962 GLUCOSE BLOOD TEST: CPT

## 2023-01-30 RX ADMIN — INSULIN DETEMIR 10 UNITS: 100 INJECTION, SOLUTION SUBCUTANEOUS at 08:22

## 2023-01-30 RX ADMIN — INSULIN LISPRO 4 UNITS: 100 INJECTION, SOLUTION INTRAVENOUS; SUBCUTANEOUS at 17:37

## 2023-01-30 RX ADMIN — SODIUM ZIRCONIUM CYCLOSILICATE 10 G: 10 POWDER, FOR SUSPENSION ORAL at 17:37

## 2023-01-30 RX ADMIN — SERTRALINE HYDROCHLORIDE 50 MG: 50 TABLET ORAL at 17:37

## 2023-01-30 RX ADMIN — SENNOSIDES AND DOCUSATE SODIUM 2 TABLET: 50; 8.6 TABLET ORAL at 21:45

## 2023-01-30 RX ADMIN — INSULIN LISPRO 7 UNITS: 100 INJECTION, SOLUTION INTRAVENOUS; SUBCUTANEOUS at 12:42

## 2023-01-30 RX ADMIN — APIXABAN 2.5 MG: 2.5 TABLET, FILM COATED ORAL at 21:45

## 2023-01-30 RX ADMIN — CYCLOBENZAPRINE 5 MG: 10 TABLET, FILM COATED ORAL at 01:19

## 2023-01-30 RX ADMIN — DEXAMETHASONE SODIUM PHOSPHATE 6 MG: 4 INJECTION INTRA-ARTICULAR; INTRALESIONAL; INTRAMUSCULAR; INTRAVENOUS; SOFT TISSUE at 12:42

## 2023-01-30 RX ADMIN — OMEPRAZOLE 20 MG: 20 CAPSULE, DELAYED RELEASE ORAL at 21:45

## 2023-01-30 RX ADMIN — Medication 10 ML: at 21:46

## 2023-01-30 RX ADMIN — SODIUM ZIRCONIUM CYCLOSILICATE 10 G: 10 POWDER, FOR SUSPENSION ORAL at 21:45

## 2023-01-30 RX ADMIN — GABAPENTIN 100 MG: 100 CAPSULE ORAL at 17:37

## 2023-01-30 RX ADMIN — BUMETANIDE 1 MG: 0.25 INJECTION INTRAMUSCULAR; INTRAVENOUS at 12:48

## 2023-01-30 RX ADMIN — BUMETANIDE 1 MG: 0.25 INJECTION INTRAMUSCULAR; INTRAVENOUS at 01:17

## 2023-01-30 RX ADMIN — INSULIN LISPRO 9 UNITS: 100 INJECTION, SOLUTION INTRAVENOUS; SUBCUTANEOUS at 08:23

## 2023-01-30 RX ADMIN — ATORVASTATIN CALCIUM 40 MG: 40 TABLET, FILM COATED ORAL at 21:45

## 2023-01-30 RX ADMIN — CLOTRIMAZOLE AND BETAMETHASONE DIPROPIONATE 1 APPLICATION: 10; .5 CREAM TOPICAL at 21:45

## 2023-01-30 RX ADMIN — GABAPENTIN 100 MG: 100 CAPSULE ORAL at 21:45

## 2023-01-30 RX ADMIN — INSULIN DETEMIR 20 UNITS: 100 INJECTION, SOLUTION SUBCUTANEOUS at 21:45

## 2023-01-31 ENCOUNTER — APPOINTMENT (OUTPATIENT)
Dept: INTERVENTIONAL RADIOLOGY/VASCULAR | Facility: HOSPITAL | Age: 83
DRG: 871 | End: 2023-01-31
Payer: MEDICARE

## 2023-01-31 LAB
ANION GAP SERPL CALCULATED.3IONS-SCNC: 8 MMOL/L (ref 5–15)
BUN SERPL-MCNC: 39 MG/DL (ref 8–23)
BUN/CREAT SERPL: 21.8 (ref 7–25)
CALCIUM SPEC-SCNC: 8.3 MG/DL (ref 8.6–10.5)
CHLORIDE SERPL-SCNC: 101 MMOL/L (ref 98–107)
CO2 SERPL-SCNC: 31 MMOL/L (ref 22–29)
CREAT SERPL-MCNC: 1.79 MG/DL (ref 0.57–1)
EGFRCR SERPLBLD CKD-EPI 2021: 27.9 ML/MIN/1.73
GLUCOSE BLDC GLUCOMTR-MCNC: 256 MG/DL (ref 70–130)
GLUCOSE BLDC GLUCOMTR-MCNC: 296 MG/DL (ref 70–130)
GLUCOSE BLDC GLUCOMTR-MCNC: 304 MG/DL (ref 70–130)
GLUCOSE BLDC GLUCOMTR-MCNC: 313 MG/DL (ref 70–130)
GLUCOSE SERPL-MCNC: 303 MG/DL (ref 65–99)
POTASSIUM SERPL-SCNC: 4.9 MMOL/L (ref 3.5–5.2)
SODIUM SERPL-SCNC: 140 MMOL/L (ref 136–145)

## 2023-01-31 PROCEDURE — 80048 BASIC METABOLIC PNL TOTAL CA: CPT | Performed by: STUDENT IN AN ORGANIZED HEALTH CARE EDUCATION/TRAINING PROGRAM

## 2023-01-31 PROCEDURE — 97110 THERAPEUTIC EXERCISES: CPT

## 2023-01-31 PROCEDURE — 63710000001 DEXAMETHASONE PER 0.25 MG: Performed by: INTERNAL MEDICINE

## 2023-01-31 PROCEDURE — 63710000001 INSULIN LISPRO (HUMAN) PER 5 UNITS: Performed by: INTERNAL MEDICINE

## 2023-01-31 PROCEDURE — 97530 THERAPEUTIC ACTIVITIES: CPT

## 2023-01-31 PROCEDURE — 97161 PT EVAL LOW COMPLEX 20 MIN: CPT

## 2023-01-31 PROCEDURE — 97166 OT EVAL MOD COMPLEX 45 MIN: CPT

## 2023-01-31 PROCEDURE — 63710000001 INSULIN DETEMIR PER 5 UNITS: Performed by: STUDENT IN AN ORGANIZED HEALTH CARE EDUCATION/TRAINING PROGRAM

## 2023-01-31 PROCEDURE — 0JPT3XZ REMOVAL OF TUNNELED VASCULAR ACCESS DEVICE FROM TRUNK SUBCUTANEOUS TISSUE AND FASCIA, PERCUTANEOUS APPROACH: ICD-10-PCS | Performed by: RADIOLOGY

## 2023-01-31 PROCEDURE — 99232 SBSQ HOSP IP/OBS MODERATE 35: CPT | Performed by: STUDENT IN AN ORGANIZED HEALTH CARE EDUCATION/TRAINING PROGRAM

## 2023-01-31 PROCEDURE — 82962 GLUCOSE BLOOD TEST: CPT

## 2023-01-31 RX ADMIN — BUMETANIDE 1 MG: 0.25 INJECTION INTRAMUSCULAR; INTRAVENOUS at 12:41

## 2023-01-31 RX ADMIN — CYCLOBENZAPRINE 5 MG: 10 TABLET, FILM COATED ORAL at 21:33

## 2023-01-31 RX ADMIN — OMEPRAZOLE 20 MG: 20 CAPSULE, DELAYED RELEASE ORAL at 21:11

## 2023-01-31 RX ADMIN — BUMETANIDE 1 MG: 0.25 INJECTION INTRAMUSCULAR; INTRAVENOUS at 01:12

## 2023-01-31 RX ADMIN — DEXAMETHASONE 6 MG: 4 TABLET ORAL at 08:36

## 2023-01-31 RX ADMIN — SODIUM ZIRCONIUM CYCLOSILICATE 10 G: 10 POWDER, FOR SUSPENSION ORAL at 21:10

## 2023-01-31 RX ADMIN — INSULIN LISPRO 7 UNITS: 100 INJECTION, SOLUTION INTRAVENOUS; SUBCUTANEOUS at 08:37

## 2023-01-31 RX ADMIN — SODIUM ZIRCONIUM CYCLOSILICATE 10 G: 10 POWDER, FOR SUSPENSION ORAL at 10:10

## 2023-01-31 RX ADMIN — CLOPIDOGREL BISULFATE 75 MG: 75 TABLET ORAL at 08:36

## 2023-01-31 RX ADMIN — INSULIN LISPRO 6 UNITS: 100 INJECTION, SOLUTION INTRAVENOUS; SUBCUTANEOUS at 12:41

## 2023-01-31 RX ADMIN — GABAPENTIN 100 MG: 100 CAPSULE ORAL at 16:10

## 2023-01-31 RX ADMIN — GABAPENTIN 100 MG: 100 CAPSULE ORAL at 21:10

## 2023-01-31 RX ADMIN — CLOTRIMAZOLE AND BETAMETHASONE DIPROPIONATE 1 APPLICATION: 10; .5 CREAM TOPICAL at 21:10

## 2023-01-31 RX ADMIN — SENNOSIDES AND DOCUSATE SODIUM 2 TABLET: 50; 8.6 TABLET ORAL at 08:36

## 2023-01-31 RX ADMIN — Medication 10 ML: at 21:11

## 2023-01-31 RX ADMIN — Medication 10 ML: at 08:37

## 2023-01-31 RX ADMIN — OMEPRAZOLE 20 MG: 20 CAPSULE, DELAYED RELEASE ORAL at 08:36

## 2023-01-31 RX ADMIN — APIXABAN 2.5 MG: 2.5 TABLET, FILM COATED ORAL at 21:10

## 2023-01-31 RX ADMIN — APIXABAN 2.5 MG: 2.5 TABLET, FILM COATED ORAL at 08:36

## 2023-01-31 RX ADMIN — INSULIN LISPRO 7 UNITS: 100 INJECTION, SOLUTION INTRAVENOUS; SUBCUTANEOUS at 16:20

## 2023-01-31 RX ADMIN — INSULIN DETEMIR 30 UNITS: 100 INJECTION, SOLUTION SUBCUTANEOUS at 21:10

## 2023-01-31 RX ADMIN — ATORVASTATIN CALCIUM 40 MG: 40 TABLET, FILM COATED ORAL at 21:10

## 2023-01-31 RX ADMIN — SENNOSIDES AND DOCUSATE SODIUM 2 TABLET: 50; 8.6 TABLET ORAL at 21:10

## 2023-01-31 RX ADMIN — CLOTRIMAZOLE AND BETAMETHASONE DIPROPIONATE 1 APPLICATION: 10; .5 CREAM TOPICAL at 08:36

## 2023-01-31 RX ADMIN — SODIUM ZIRCONIUM CYCLOSILICATE 10 G: 10 POWDER, FOR SUSPENSION ORAL at 16:26

## 2023-01-31 RX ADMIN — GABAPENTIN 100 MG: 100 CAPSULE ORAL at 08:36

## 2023-01-31 RX ADMIN — SERTRALINE HYDROCHLORIDE 50 MG: 50 TABLET ORAL at 08:36

## 2023-02-01 LAB
ANION GAP SERPL CALCULATED.3IONS-SCNC: 10 MMOL/L (ref 5–15)
BASOPHILS # BLD AUTO: 0.01 10*3/MM3 (ref 0–0.2)
BASOPHILS NFR BLD AUTO: 0.1 % (ref 0–1.5)
BUN SERPL-MCNC: 47 MG/DL (ref 8–23)
BUN/CREAT SERPL: 29.6 (ref 7–25)
CALCIUM SPEC-SCNC: 8.2 MG/DL (ref 8.6–10.5)
CHLORIDE SERPL-SCNC: 99 MMOL/L (ref 98–107)
CO2 SERPL-SCNC: 32 MMOL/L (ref 22–29)
CREAT SERPL-MCNC: 1.59 MG/DL (ref 0.57–1)
DEPRECATED RDW RBC AUTO: 75.7 FL (ref 37–54)
EGFRCR SERPLBLD CKD-EPI 2021: 32.1 ML/MIN/1.73
EOSINOPHIL # BLD AUTO: 0 10*3/MM3 (ref 0–0.4)
EOSINOPHIL NFR BLD AUTO: 0 % (ref 0.3–6.2)
ERYTHROCYTE [DISTWIDTH] IN BLOOD BY AUTOMATED COUNT: 21.9 % (ref 12.3–15.4)
GLUCOSE BLDC GLUCOMTR-MCNC: 201 MG/DL (ref 70–130)
GLUCOSE BLDC GLUCOMTR-MCNC: 223 MG/DL (ref 70–130)
GLUCOSE BLDC GLUCOMTR-MCNC: 257 MG/DL (ref 70–130)
GLUCOSE BLDC GLUCOMTR-MCNC: 268 MG/DL (ref 70–130)
GLUCOSE BLDC GLUCOMTR-MCNC: 524 MG/DL (ref 70–130)
GLUCOSE SERPL-MCNC: 273 MG/DL (ref 65–99)
HCT VFR BLD AUTO: 32.8 % (ref 34–46.6)
HGB BLD-MCNC: 9.8 G/DL (ref 12–15.9)
IMM GRANULOCYTES # BLD AUTO: 0.17 10*3/MM3 (ref 0–0.05)
IMM GRANULOCYTES NFR BLD AUTO: 2.3 % (ref 0–0.5)
LYMPHOCYTES # BLD AUTO: 0.45 10*3/MM3 (ref 0.7–3.1)
LYMPHOCYTES NFR BLD AUTO: 6 % (ref 19.6–45.3)
MCH RBC QN AUTO: 28.5 PG (ref 26.6–33)
MCHC RBC AUTO-ENTMCNC: 29.9 G/DL (ref 31.5–35.7)
MCV RBC AUTO: 95.3 FL (ref 79–97)
MONOCYTES # BLD AUTO: 0.37 10*3/MM3 (ref 0.1–0.9)
MONOCYTES NFR BLD AUTO: 4.9 % (ref 5–12)
NEUTROPHILS NFR BLD AUTO: 6.52 10*3/MM3 (ref 1.7–7)
NEUTROPHILS NFR BLD AUTO: 86.7 % (ref 42.7–76)
NRBC BLD AUTO-RTO: 0.4 /100 WBC (ref 0–0.2)
PLATELET # BLD AUTO: 149 10*3/MM3 (ref 140–450)
PMV BLD AUTO: 10 FL (ref 6–12)
POTASSIUM SERPL-SCNC: 4.2 MMOL/L (ref 3.5–5.2)
QT INTERVAL: 334 MS
QTC INTERVAL: 449 MS
RBC # BLD AUTO: 3.44 10*6/MM3 (ref 3.77–5.28)
SODIUM SERPL-SCNC: 141 MMOL/L (ref 136–145)
WBC NRBC COR # BLD: 7.52 10*3/MM3 (ref 3.4–10.8)

## 2023-02-01 PROCEDURE — 93010 ELECTROCARDIOGRAM REPORT: CPT | Performed by: INTERNAL MEDICINE

## 2023-02-01 PROCEDURE — 93005 ELECTROCARDIOGRAM TRACING: CPT | Performed by: STUDENT IN AN ORGANIZED HEALTH CARE EDUCATION/TRAINING PROGRAM

## 2023-02-01 PROCEDURE — 63710000001 INSULIN DETEMIR PER 5 UNITS: Performed by: PHYSICIAN ASSISTANT

## 2023-02-01 PROCEDURE — 63710000001 INSULIN DETEMIR PER 5 UNITS: Performed by: STUDENT IN AN ORGANIZED HEALTH CARE EDUCATION/TRAINING PROGRAM

## 2023-02-01 PROCEDURE — 85025 COMPLETE CBC W/AUTO DIFF WBC: CPT | Performed by: STUDENT IN AN ORGANIZED HEALTH CARE EDUCATION/TRAINING PROGRAM

## 2023-02-01 PROCEDURE — 63710000001 INSULIN LISPRO (HUMAN) PER 5 UNITS: Performed by: PHYSICIAN ASSISTANT

## 2023-02-01 PROCEDURE — 99232 SBSQ HOSP IP/OBS MODERATE 35: CPT | Performed by: PHYSICIAN ASSISTANT

## 2023-02-01 PROCEDURE — 63710000001 INSULIN LISPRO (HUMAN) PER 5 UNITS: Performed by: INTERNAL MEDICINE

## 2023-02-01 PROCEDURE — 97530 THERAPEUTIC ACTIVITIES: CPT

## 2023-02-01 PROCEDURE — 97535 SELF CARE MNGMENT TRAINING: CPT

## 2023-02-01 PROCEDURE — 63710000001 DEXAMETHASONE PER 0.25 MG: Performed by: INTERNAL MEDICINE

## 2023-02-01 PROCEDURE — 80048 BASIC METABOLIC PNL TOTAL CA: CPT | Performed by: PHYSICIAN ASSISTANT

## 2023-02-01 PROCEDURE — 82962 GLUCOSE BLOOD TEST: CPT

## 2023-02-01 RX ORDER — HYDROXYZINE HYDROCHLORIDE 10 MG/1
10 TABLET, FILM COATED ORAL ONCE
Status: COMPLETED | OUTPATIENT
Start: 2023-02-01 | End: 2023-02-01

## 2023-02-01 RX ORDER — GABAPENTIN 100 MG/1
100 CAPSULE ORAL DAILY
Status: DISCONTINUED | OUTPATIENT
Start: 2023-02-02 | End: 2023-02-02 | Stop reason: HOSPADM

## 2023-02-01 RX ORDER — METOPROLOL SUCCINATE 25 MG/1
25 TABLET, EXTENDED RELEASE ORAL
Status: DISCONTINUED | OUTPATIENT
Start: 2023-02-01 | End: 2023-02-02 | Stop reason: HOSPADM

## 2023-02-01 RX ORDER — INSULIN LISPRO 100 [IU]/ML
5 INJECTION, SOLUTION INTRAVENOUS; SUBCUTANEOUS
Status: DISCONTINUED | OUTPATIENT
Start: 2023-02-01 | End: 2023-02-02

## 2023-02-01 RX ORDER — HYDROXYZINE HYDROCHLORIDE 25 MG/1
25 TABLET, FILM COATED ORAL NIGHTLY
Status: DISCONTINUED | OUTPATIENT
Start: 2023-02-01 | End: 2023-02-02 | Stop reason: HOSPADM

## 2023-02-01 RX ORDER — METOPROLOL TARTRATE 5 MG/5ML
2.5 INJECTION INTRAVENOUS ONCE
Status: COMPLETED | OUTPATIENT
Start: 2023-02-01 | End: 2023-02-01

## 2023-02-01 RX ADMIN — INSULIN DETEMIR 10 UNITS: 100 INJECTION, SOLUTION SUBCUTANEOUS at 14:26

## 2023-02-01 RX ADMIN — BUMETANIDE 1 MG: 0.25 INJECTION INTRAMUSCULAR; INTRAVENOUS at 01:51

## 2023-02-01 RX ADMIN — HYDROXYZINE HYDROCHLORIDE 25 MG: 25 TABLET, FILM COATED ORAL at 20:49

## 2023-02-01 RX ADMIN — INSULIN LISPRO 5 UNITS: 100 INJECTION, SOLUTION INTRAVENOUS; SUBCUTANEOUS at 17:44

## 2023-02-01 RX ADMIN — Medication 10 ML: at 20:50

## 2023-02-01 RX ADMIN — OMEPRAZOLE 20 MG: 20 CAPSULE, DELAYED RELEASE ORAL at 09:28

## 2023-02-01 RX ADMIN — GABAPENTIN 100 MG: 100 CAPSULE ORAL at 09:24

## 2023-02-01 RX ADMIN — DEXAMETHASONE 6 MG: 4 TABLET ORAL at 09:24

## 2023-02-01 RX ADMIN — APIXABAN 2.5 MG: 2.5 TABLET, FILM COATED ORAL at 09:24

## 2023-02-01 RX ADMIN — INSULIN LISPRO 4 UNITS: 100 INJECTION, SOLUTION INTRAVENOUS; SUBCUTANEOUS at 17:43

## 2023-02-01 RX ADMIN — INSULIN LISPRO 6 UNITS: 100 INJECTION, SOLUTION INTRAVENOUS; SUBCUTANEOUS at 12:27

## 2023-02-01 RX ADMIN — CLOTRIMAZOLE AND BETAMETHASONE DIPROPIONATE 1 APPLICATION: 10; .5 CREAM TOPICAL at 20:50

## 2023-02-01 RX ADMIN — METOPROLOL SUCCINATE 25 MG: 25 TABLET, EXTENDED RELEASE ORAL at 09:24

## 2023-02-01 RX ADMIN — HYDROXYZINE HYDROCHLORIDE 10 MG: 10 TABLET ORAL at 03:01

## 2023-02-01 RX ADMIN — CLOTRIMAZOLE AND BETAMETHASONE DIPROPIONATE 1 APPLICATION: 10; .5 CREAM TOPICAL at 09:28

## 2023-02-01 RX ADMIN — SERTRALINE HYDROCHLORIDE 50 MG: 50 TABLET ORAL at 09:27

## 2023-02-01 RX ADMIN — ATORVASTATIN CALCIUM 40 MG: 40 TABLET, FILM COATED ORAL at 20:50

## 2023-02-01 RX ADMIN — OMEPRAZOLE 20 MG: 20 CAPSULE, DELAYED RELEASE ORAL at 20:50

## 2023-02-01 RX ADMIN — Medication 10 ML: at 09:28

## 2023-02-01 RX ADMIN — APIXABAN 2.5 MG: 2.5 TABLET, FILM COATED ORAL at 20:49

## 2023-02-01 RX ADMIN — CLOPIDOGREL BISULFATE 75 MG: 75 TABLET ORAL at 09:24

## 2023-02-01 RX ADMIN — BUMETANIDE 1 MG: 0.25 INJECTION INTRAMUSCULAR; INTRAVENOUS at 12:27

## 2023-02-01 RX ADMIN — METOPROLOL TARTRATE 2.5 MG: 5 INJECTION INTRAVENOUS at 03:01

## 2023-02-01 RX ADMIN — INSULIN LISPRO 6 UNITS: 100 INJECTION, SOLUTION INTRAVENOUS; SUBCUTANEOUS at 09:26

## 2023-02-01 RX ADMIN — INSULIN DETEMIR 30 UNITS: 100 INJECTION, SOLUTION SUBCUTANEOUS at 20:48

## 2023-02-01 NOTE — CASE MANAGEMENT/SOCIAL WORK
Continued Stay Note  [facility]     Patient Name: Susan Anderson  MRN: [id number]  Today's Date: 2/1/2023    Admit Date: 1/28/2023    Plan: Home with HH   Discharge Plan     Row Name 02/01/23 1607       Plan    Plan Home with HH    Plan Comments Spoke with patient by phone. [facility] and [facility]. Patient stated that daughter can transport home. New order for home health in [facility]. No other discharge needs. CM will follow.    Final Discharge Disposition Code 06 - home with home health care               Discharge Codes    No documentation.               Expected Discharge Date and Time     Expected Discharge Date Expected Discharge Time    Feb 2, 2023             Karis Barrera RN

## 2023-02-01 NOTE — THERAPY TREATMENT NOTE
Patient Name: Susan Anderson  : 1940    MRN: [id number]                              Today's Date: 2023       Admit Date: 2023    Visit Dx:     ICD-10-CM ICD-9-CM   1. COVID-19  U07.1 079.89   2. Sepsis with encephalopathy without septic shock, due to unspecified organism ([facility])  A41.9 038.9    R65.20 995.91    G93.40 348.30   3. Adverse effect of drug, initial encounter  T50.905A E947.9   4. Hypothermia, initial encounter  T68.XXXA 991.6     Patient Active Problem List   Diagnosis   • Diabetic foot ulcer ([facility])   • PVD (peripheral vascular disease) ([facility])   • Osteomyelitis ([facility])   • Diabetes mellitus with neuropathy ([facility])   • Essential hypertension   • Stage 3a chronic kidney disease ([facility])   • Pyogenic inflammation of bone ([facility])   • Hyperglycemia   • Pneumonia due to infectious organism   • Mitral valve disease   • NICM (nonischemic cardiomyopathy) ([facility])   • CAD   • Dyslipidemia   • Chronic combined systolic and diastolic heart failure    • Lumbar stenosis with neurogenic claudication   • Spondylosis of lumbar region without myelopathy or radiculopathy   • Spondylolisthesis, lumbar region   • Degeneration of lumbar or lumbosacral intervertebral disc   • Gait disturbance   • Physical deconditioning   • Sarcopenia   • Weakness   • Anemia   • Fall   • Dizziness   • Demand ischemia ([facility])   • Effusion of right knee   • Right knee pain   • Pressure injury of skin of sacral region   • Sleep apnea   • GIB (gastrointestinal bleeding)   • Vasovagal syncope   • Hypomagnesemia   • Syncope, unspecified syncope type   • Paroxysmal atrial fibrillation   • Ulcer of esophagus without bleeding   • T2DM    • COVID-19   • Sepsis ([facility])     Past Medical History:   Diagnosis Date   • Anxiety    • Arthritis    • Asthma / - double pneumonia    Currently on inhaler and nebulizer   • Atrial fibrillation ([facility]) 2022   • Cancer ([facility])     cervical cancer, skin cancer   • CHF (congestive heart failure) ([facility]) 2021   •  Chronic kidney disease Related to diabetes   • Coronary artery disease 6/4/2021 DX for hear failure   • Diabetes mellitus (HCC) 30 years    Seeing Dr. Lam 1st time Aug 19   • GERD (gastroesophageal reflux disease)    • Gout    • History of staph infection 10/2021    right toe   • History of transfusion     no reactions, 1 unit, [facility]   • Hx of colonoscopy    • Hyperlipidemia Reference current labs x 2-3yrs approx   • Hypertension 30 years   • Insomnia    • Migraine    • Mitral valve disease    • Mitral valve disease    • Osteomyelitis (HCC)    • Peripheral neuropathy    • Sleep apnea    • Type 2 diabetes mellitus (HCC)     30 years     Past Surgical History:   Procedure Laterality Date   • ABDOMINAL HYSTERECTOMY W/SALPINGECTOMY     • AMPUTATION  Right great toe, 1st 1/3 metatarsal -[place] 4/14/21   • AORTAGRAM N/A 04/09/2021    Procedure: AORTAGRAM WITH OR WITHOUT RUNOFFS, WITH Co2;  Surgeon: Trey Forrest MD;  Location:  [facility];  Service: Vascular;  Laterality: N/A;   • AORTAGRAM N/A 09/28/2022    Procedure: CO2 ANGIOGRAM, LEFT SFA ANGIOPLASTY WITH DRUG ELUTING BALLOON, LEFT SFA STENT PLACEMENT ;  Surgeon: Trey Forrest MD;  Location:  [facility];  Service: Vascular;  Laterality: N/A;  FLUORO: 13.12  DOSE 162mGy  CONTRAST: Isovue 350: 15ml   • APPENDECTOMY     • BRAIN TUMOR EXCISION      laser surgery    • CARDIAC CATHETERIZATION N/A 06/21/2021    Procedure: LEFT HEART CATH;  Surgeon: Anjum Ceballos MD;  Location:  [facility];  Service: Cardiology;  Laterality: N/A;   • CATARACT EXTRACTION W/ INTRAOCULAR LENS  IMPLANT, BILATERAL     • CHOLECYSTECTOMY     • COLONOSCOPY     • ENDOSCOPY N/A 10/07/2022    Procedure: ESOPHAGOGASTRODUODENOSCOPY;  Surgeon: Stef Veras MD;  Location:  [facility];  Service: Gastroenterology;  Laterality: N/A;   • EYE SURGERY     • FEMORAL ARTERY STENT  10/2022   • HYSTERECTOMY     • INTERVENTIONAL RADIOLOGY PROCEDURE N/A 1/15/2023    Procedure:  CV INTERVENTIONAL RADIOLOGY PROCEDURE;  Surgeon: Sanket Zapata MD;  Location:  [facility];  Service: Interventional Radiology;  Laterality: N/A;   • TOE SURGERY     • TRANS METATARSAL AMPUTATION Right 04/14/2021    Procedure: AMPUTATION TRANS METATARSAL RIGHT GREAT TOE;  Surgeon: Valdez Finney MD;  Location:  [facility];  Service: Vascular;  Laterality: Right;      General Information     Row Name 02/01/23 1136          OT Time and Intention    Document Type therapy note (daily note)  -     Mode of Treatment occupational therapy  -     Row Name 02/01/23 1136          General Information    Patient Profile Reviewed yes  -     Existing Precautions/Restrictions fall;oxygen therapy device and L/min  -     Barriers to Rehab medically complex;previous functional deficit  -     Row Name 02/01/23 1136          Cognition    Orientation Status (Cognition) oriented x 3  -     Row Name 02/01/23 1136          Safety Issues, Functional Mobility    Safety Issues Affecting Function (Mobility) awareness of need for assistance;insight into deficits/self-awareness;safety precaution awareness;safety precautions follow-through/compliance;sequencing abilities  -     Impairments Affecting Function (Mobility) balance;endurance/activity tolerance;shortness of breath;strength;sensation/sensory awareness  -           User Key  (r) = Recorded By, (t) = Taken By, (c) = Cosigned By    Initials Name Provider Type     Larissa Paul OT Occupational Therapist                 Mobility/ADL's     Row Name 02/01/23 1136          Bed Mobility    Comment, (Bed Mobility) Pt received on BSC, left sitting UIC  -     Row Name 02/01/23 1136          Transfers    Transfers sit-stand transfer;stand-sit transfer  -     Comment, (Transfers) Pt performed STSx3 w/ mod Ax1 & BUE support, BSC removed from behind pt and recliner placed. VC's for sequencing and safe hand placement.  -     Row Name 02/01/23 1136           Sit-Stand Transfer    Sit-Stand Stafford (Transfers) moderate assist (50% patient effort);1 person assist;verbal cues  -     Assistive Device (Sit-Stand Transfers) other (see comments)  -     Row Name 02/01/23 1136          Stand-Sit Transfer    Stand-Sit [place] (Transfers) moderate assist (50% patient effort);1 person assist;verbal cues  -     Assistive Device (Stand-Sit Transfers) other (see comments)  -     Row Name 02/01/23 1136          Activities of Daily Living    BADL Assessment/Intervention grooming;toileting  -     Row Name 02/01/23 1136          Grooming Assessment/Training    Stafford Level (Grooming) wash face, hands;oral care regimen;set up  -     Position (Grooming) supported sitting  -     Row Name 02/01/23 1136          Toileting Assessment/Training    [place]ord Level (Toileting) adjust/manage clothing;change pad/brief;perform perineal hygiene;dependent (less than 25% patient effort)  -     Assistive Devices (Toileting) commode, bedside without drop arms  -     Position (Toileting) supported standing  -           User Key  (r) = Recorded By, (t) = Taken By, (c) = Cosigned By    Initials Name Provider Type     Larissa Paul OT Occupational Therapist               Obj/Interventions     Row Name 02/01/23 1137          Balance    Balance Assessment sitting static balance;sit to stand dynamic balance;standing static balance;standing dynamic balance;sitting dynamic balance  -     Static Sitting Balance supervision  -     Dynamic Sitting Balance supervision  -     Position, Sitting Balance supported;other (see comments)  BSC  -     Sit to Stand Dynamic Balance moderate assist;1-person assist;verbal cues  -     Static Standing Balance moderate assist;1-person assist;verbal cues  -     Dynamic Standing Balance moderate assist;1-person assist;verbal cues  -     Position/Device Used, Standing Balance supported  -     Balance Interventions sitting;sit  to stand;occupation based/functional task  -           User Key  (r) = Recorded By, (t) = Taken By, (c) = Cosigned By    Initials Name Provider Type    Larsisa Dasilva OT Occupational Therapist               Goals/Plan    No documentation.                Clinical Impression     Row Name 02/01/23 1138          Pain Assessment    Pretreatment Pain Rating 0/10 - no pain  -     Posttreatment Pain Rating 0/10 - no pain  -     Row Name 02/01/23 1138          Plan of Care Review    Plan of Care Reviewed With patient  -     Progress no change  -     Outcome Evaluation Pt presents w/ generalized weakness, decreased functional endurance, and balance deficits limiting her ADL independence. Pt req increased assistance for transfers this date. Pt continues to present below baseline functional status warranting further skilled IPOT services to return to [facility]. Rec SNF at d/c.  -     Row Name 02/01/23 1138          Therapy Assessment/Plan (OT)    Rehab Potential (OT) good, to achieve stated therapy goals  -     Criteria for Skilled Therapeutic Interventions Met (OT) yes;skilled treatment is necessary  -     Therapy Frequency (OT) daily  -     Row Name 02/01/23 1138          Therapy Plan Review/Discharge Plan (OT)    Anticipated Discharge Disposition (OT) skilled nursing facility  -     Row Name 02/01/23 1138          Vital Signs    Pre Systolic BP Rehab --  VSS - RN cleared OT  -     O2 Delivery Pre Treatment nasal cannula  -     O2 Delivery Intra Treatment nasal cannula  -     O2 Delivery Post Treatment nasal cannula  -     Pre Patient Position Sitting  -     Intra Patient Position Standing  -     Post Patient Position Sitting  -     Row Name 02/01/23 1138          Positioning and Restraints    Pre-Treatment Position bedside commode  -     Post Treatment Position chair  -     In Chair notified nsg;reclined;call light within reach;encouraged to call for assist;exit alarm on;waffle  cushion;legs elevated;heels elevated  -           User Key  (r) = Recorded By, (t) = Taken By, (c) = Cosigned By    Initials Name Provider Type    Larissa Dasilva OT Occupational Therapist               Outcome Measures     Row Name 02/01/23 1140          How much help from another is currently needed...    Putting on and taking off regular lower body clothing? 2  -MC     Bathing (including washing, rinsing, and drying) 2  -MC     Toileting (which includes using toilet bed pan or urinal) 3  -MC     Putting on and taking off regular upper body clothing 3  -MC     Taking care of personal grooming (such as brushing teeth) 3  -MC     Eating meals 3  -     AM-PAC 6 Clicks Score (OT) 16  -     Row Name 02/01/23 1140          Functional Assessment    Outcome Measure Options AM-PAC 6 Clicks Daily Activity (OT)  -           User Key  (r) = Recorded By, (t) = Taken By, (c) = Cosigned By    Initials Name Provider Type    Larissa Dasilva OT Occupational Therapist                Occupational Therapy Education     Title: PT OT SLP Therapies (In Progress)     Topic: Occupational Therapy (In Progress)     Point: ADL training (In Progress)     Description:   Instruct learner(s) on proper safety adaptation and remediation techniques during self care or transfers.   Instruct in proper use of assistive devices.              Learning Progress Summary           Patient Acceptance, E, NR by  at 2/1/2023 1140    Acceptance, E, VU by  at 1/31/2023 1154                   Point: Home exercise program (Not Started)     Description:   Instruct learner(s) on appropriate technique for monitoring, assisting and/or progressing therapeutic exercises/activities.              Learner Progress:  Not documented in this visit.          Point: Precautions (In Progress)     Description:   Instruct learner(s) on prescribed precautions during self-care and functional transfers.              Learning Progress Summary            Patient Acceptance, E, NR by  at 2/1/2023 1140    Acceptance, E, VU by  at 1/31/2023 1154                   Point: Body mechanics (In Progress)     Description:   Instruct learner(s) on proper positioning and spine alignment during self-care, functional mobility activities and/or exercises.              Learning Progress Summary           Patient Acceptance, E, NR by  at 2/1/2023 1140    Acceptance, E, VU by  at 1/31/2023 1154                               User Key     Initials Effective Dates Name Provider Type Discipline     10/14/22 -  Larissa Paul OT Occupational Therapist OT              OT Recommendation and Plan  Planned Therapy Interventions (OT): activity tolerance training, adaptive equipment training, BADL retraining, functional balance retraining, IADL retraining, occupation/activity based interventions, ROM/therapeutic exercise, strengthening exercise, transfer/mobility retraining, patient/caregiver education/training  Therapy Frequency (OT): daily  Plan of Care Review  Plan of Care Reviewed With: patient  Progress: no change  Outcome Evaluation: Pt presents w/ generalized weakness, decreased functional endurance, and balance deficits limiting her ADL independence. Pt req increased assistance for transfers this date. Pt continues to present below baseline functional status warranting further skilled IPOT services to return to [facility]. Rec SNF at d/c.     Time Calculation:    Time Calculation- OT     Row Name 02/01/23 1140             Time Calculation- OT    OT Start Time 1103  -      OT Received On 02/01/23  -      OT Goal Re-Cert Due Date 02/10/23  -         Timed Charges    44230 - OT Therapeutic Activity Minutes 8  -      90184 - OT Self Care/Mgmt Minutes 23  -         Total Minutes    Timed Charges Total Minutes 31  -       Total Minutes 31  -            User Key  (r) = Recorded By, (t) = Taken By, (c) = Cosigned By    Initials Name Provider Type     Humberto  MUSTAPHA Dias Occupational Therapist              Therapy Charges for Today     Code Description Service Date Service Provider Modifiers Qty    89729219290 HC OT THERAPEUTIC ACT EA 15 MIN 1/31/2023 Larissa Paul OT GO 1    [id number]  OT EVAL MOD COMPLEXITY 3 1/31/2023 Larissa Paul OT GO 1    08845583337 HC OT THERAPEUTIC ACT EA 15 MIN 2/1/2023 Larissa Paul OT GO 1    [id number]  OT SELF CARE/MGMT/TRAIN EA 15 MIN 2/1/2023 Larissa Paul OT GO 1               Larissa Paul OT  2/1/2023

## 2023-02-01 NOTE — PLAN OF CARE
Goal Outcome Evaluation:  Plan of Care Reviewed With: patient        Progress: no change  Outcome Evaluation: Pt presents w/ generalized weakness, decreased functional endurance, and balance deficits limiting her ADL independence. Pt req increased assistance for transfers this date. Pt continues to present below baseline functional status warranting further skilled IPOT services to return to [facility]. Rec SNF at d/c.

## 2023-02-01 NOTE — PROGRESS NOTES
" LOS: 4 days   Patient Care Team:  Arleen Doherty MD as PCP - General (Internal Medicine)  Flory Sauer APRN (Nurse Practitioner)  Janeth Lam MD as Consulting Physician (Endocrinology)  Anjum Ceballos MD as Consulting Physician (Cardiology)    Chief Complaint: LACEY on CKD stage III on HD.     Subjective   Stable renal function. On 1 liter supplemental o2 ok to resume diuretics     Subjective    History taken from: RN    Objective     Vital Sign Min/Max for last 24 hours  Temp  Min: 96 °F (35.6 °C)  Max: 98.1 °F (36.7 °C)   BP  Min: 144/95  Max: 169/121   Pulse  Min: 80  Max: 115   Resp  Min: 16  Max: 19   SpO2  Min: 96 %  Max: 96 %   Flow (L/min)  Min: 3  Max: 3   No data recorded     Flowsheet Rows    Flowsheet Row First Filed Value   Admission Height 162.6 cm (64\") Documented at 01/28/2023 1142   Admission Weight 82.6 kg (182 lb) Documented at 01/28/2023 1142          I/O this shift:  In: 240 [P.O.:240]  Out: -   I/O last 3 completed shifts:  In: 1050 [P.O.:1050]  Out: 500 [Urine:500]    Objective:  General Appearance:  Comfortable.    Vital signs: (most recent): Blood pressure 147/91, pulse 80, temperature 97.6 °F (36.4 °C), temperature source Oral, resp. rate 18, height 162.6 cm (64\"), weight 82.6 kg (182 lb), SpO2 96 %.    Output: Producing urine.    HEENT: Normal HEENT exam.    Lungs:  Normal effort.    Heart: Normal rate.    Extremities: There is dependent edema.    Neurological: (Sleeping. At baseline AAX3).    Skin:  Warm.              Results Review:     I reviewed the patient's new clinical results.    WBC WBC   Date Value Ref Range Status   02/01/2023 7.52 3.40 - 10.80 10*3/mm3 Final   01/30/2023 8.04 3.40 - 10.80 10*3/mm3 Final      HGB Hemoglobin   Date Value Ref Range Status   02/01/2023 9.8 (L) 12.0 - 15.9 g/dL Final   01/30/2023 9.2 (L) 12.0 - 15.9 g/dL Final      HCT Hematocrit   Date Value Ref Range Status   02/01/2023 32.8 (L) 34.0 - 46.6 % Final   01/30/2023 31.6 (L) 34.0 - " 46.6 % Final      Platlets No results found for: LABPLAT   MCV MCV   Date Value Ref Range Status   02/01/2023 95.3 79.0 - 97.0 fL Final   01/30/2023 97.5 (H) 79.0 - 97.0 fL Final          Sodium Sodium   Date Value Ref Range Status   02/01/2023 141 136 - 145 mmol/L Final   01/31/2023 140 136 - 145 mmol/L Final   01/30/2023 136 136 - 145 mmol/L Final      Potassium Potassium   Date Value Ref Range Status   02/01/2023 4.2 3.5 - 5.2 mmol/L Final   01/31/2023 4.9 3.5 - 5.2 mmol/L Final     Comment:     Slight hemolysis detected by analyzer. Results may be affected.   01/30/2023 5.4 (H) 3.5 - 5.2 mmol/L Final   01/29/2023 5.4 (H) 3.5 - 5.2 mmol/L Final      Chloride Chloride   Date Value Ref Range Status   02/01/2023 99 98 - 107 mmol/L Final   01/31/2023 101 98 - 107 mmol/L Final   01/30/2023 100 98 - 107 mmol/L Final      CO2 CO2   Date Value Ref Range Status   02/01/2023 32.0 (H) 22.0 - 29.0 mmol/L Final   01/31/2023 31.0 (H) 22.0 - 29.0 mmol/L Final   01/30/2023 28.0 22.0 - 29.0 mmol/L Final      BUN BUN   Date Value Ref Range Status   02/01/2023 47 (H) 8 - 23 mg/dL Final   01/31/2023 39 (H) 8 - 23 mg/dL Final   01/30/2023 38 (H) 8 - 23 mg/dL Final      Creatinine Creatinine   Date Value Ref Range Status   02/01/2023 1.59 (H) 0.57 - 1.00 mg/dL Final   01/31/2023 1.79 (H) 0.57 - 1.00 mg/dL Final   01/30/2023 1.96 (H) 0.57 - 1.00 mg/dL Final      Calcium Calcium   Date Value Ref Range Status   02/01/2023 8.2 (L) 8.6 - 10.5 mg/dL Final   01/31/2023 8.3 (L) 8.6 - 10.5 mg/dL Final   01/30/2023 8.4 (L) 8.6 - 10.5 mg/dL Final      PO4 No results found for: CAPO4   Albumin No results found for: ALBUMIN   Magnesium No results found for: MG   Uric Acid No results found for: URICACID     Medication Review: yes    Assessment & Plan       Sepsis (HCC)    PVD (peripheral vascular disease) (HCC)    Diabetes mellitus with neuropathy (HCC)    Essential hypertension    Stage 3a chronic kidney disease (HCC)    NICM (nonischemic  cardiomyopathy) (HCC)    CAD    Chronic combined systolic and diastolic heart failure     Sleep apnea    COVID-19      Assessment & Plan     #  LACEY on CKD stage 3 -  started on dialysis during recent admission on TTS schedule. Etiology ischemic ATN following PEA arrest. She required few HD treatments with spontaneous renal recovery. Dialysis on hold during this admission. TDC removed on 1/31/22.       # Hyperkalemia:  K 6.4>6.0>5.4 on this admission. Currently on lokelma     # Anemia:   - Hgb 10.2>9.2 Hold IV iron in the setting of recent infection.      # COVID PNA:   - Started on dexamethasone.      # Altered mental status: waxing and waning     Recs  Renal function stable. Stop lokelma. TDC removed.  Ok to continue bumex 1 mg BID.     Thank you for the consult, will follow with you.     Cecil Neff MD  02/01/23  14:24 EST

## 2023-02-01 NOTE — PLAN OF CARE
Goal Outcome Evaluation:         Patient was much more alert.  Heart rate was elevated.  EKG done.  In afib.  Metoprolol 2.5 given and one time dose of hydroxyzine because she was restless.  Patient fell asleep and heartrate is now in the low 100's.  Refusing to turn on her left side. Only sleeps on back or right side.

## 2023-02-01 NOTE — PROGRESS NOTES
[facility] Medicine Services  PROGRESS NOTE    Patient Name: Susan Anderson  : 1940  MRN: [id number]    Date of Admission: 2023  Primary Care Physician: Arleen Doherty MD    Subjective     CC: f/u COVID    HPI:  Sitting up in chair. Was very lethargic this AM. Per patient, did not get to sleep until after 4am and was very tired this morning when nursing staff tried to wake her. By time of my exam, feeling well with no new complaints. Wants to go home. I attempted to wean to room air at bedside- unsuccessful - requiring 1L NC to maintain O2 sats     ROS:  Gen- No fevers, chills  CV- No chest pain, palpitations  Resp- No cough, dyspnea  GI- No N/V/D, abd pain    Objective     Vital Signs:   Temp:  [96 °F (35.6 °C)-98.1 °F (36.7 °C)] 97.6 °F (36.4 °C)  Heart Rate:  [] 80  Resp:  [16-19] 18  BP: (144-169)/() 147/91  Flow (L/min):  [3] 3     Physical Exam:  Constitutional: No acute distress, awake, alert and conversant. Sitting in chair   HENT: NCAT, mucous membranes moist  Respiratory: Bibasilar rales without wheezes or rhonchi, respiratory effort normal on 1L NC  Cardiovascular: RRR, no murmurs, rubs, or gallops  Gastrointestinal: Positive bowel sounds, soft, nontender, nondistended  Musculoskeletal: No bilateral ankle edema  Psychiatric: Appropriate affect, cooperative  Neurologic: Oriented x 3, moves all extremities spontaneously without focal deficits, speech clear    Results Reviewed:  LAB RESULTS:      Lab 23  0454 23  0514 23  0804 23  1218 23  1203   WBC 7.52 8.04 6.31  --  5.76   HEMOGLOBIN 9.8* 9.2* 10.2*  --  9.9*   HEMOGLOBIN, POC  --   --   --  11.6*  --    HEMATOCRIT 32.8* 31.6* 35.3  --  34.7   HEMATOCRIT POC  --   --   --  34*  --    PLATELETS 149 133* 118*  --  91*   NEUTROS ABS 6.52 7.03* 5.62  --  4.34   IMMATURE GRANS (ABS) 0.17* 0.10* 0.06*  --  0.05   LYMPHS ABS 0.45* 0.47* 0.47*  --  0.73   MONOS ABS 0.37 0.44  0.15  --  0.59   EOS ABS 0.00 0.00 0.00  --  0.02   MCV 95.3 97.5* 97.0  --  97.7*   SED RATE  --   --   --   --  19   CRP  --   --   --   --  1.80*   LACTATE  --   --   --   --  1.2   LDH  --   --   --   --  486*         Lab 02/01/23  0928 01/31/23  0625 01/30/23  0514 01/29/23  1556 01/29/23  1019 01/29/23  0804 01/28/23  1218 01/28/23  1203   SODIUM 141 140 136  --   --  137  --  138   POTASSIUM 4.2 4.9 5.4* 5.4* 6.0* 6.4*  --  5.2   CHLORIDE 99 101 100  --   --  99  --  101   CO2 32.0* 31.0* 28.0  --   --  23.0  --  30.0*   ANION GAP 10.0 8.0 8.0  --   --  15.0  --  7.0   BUN 47* 39* 38*  --   --  31*  --  24*   CREATININE 1.59* 1.79* 1.96*  --   --  1.70* 1.90* 1.91*   EGFR 32.1* 27.9* 25.0*  --   --  29.6* 25.9* 25.8*   GLUCOSE 273* 303* 400*  --   --  343*  --  112*   CALCIUM 8.2* 8.3* 8.4*  --   --  8.3*  --  8.3*   MAGNESIUM  --   --   --   --   --   --   --  1.9   TSH  --   --  0.480  --   --   --   --   --          Lab 01/29/23  0804 01/28/23  1203   TOTAL PROTEIN 5.5* 5.8*   ALBUMIN 3.3* 3.2*   GLOBULIN 2.2 2.6   ALT (SGPT) 32 30   AST (SGOT) 41* 45*   BILIRUBIN 0.9 0.7   ALK PHOS 244* 247*         Lab 01/28/23  1203   TROPONIN T 0.221*     Brief Urine Lab Results  (Last result in the past 365 days)      Color   Clarity   Blood   Leuk Est   Nitrite   Protein   CREAT   Urine HCG        01/28/23 1203 Dark Yellow   Turbid   Small (1+)   Negative   Negative   >=300 mg/dL (3+)               Microbiology Results Abnormal     Procedure Component Value - Date/Time    Blood Culture - Blood, Hand, Right [[id number]]  (Normal) Collected: 01/28/23 1321    Lab Status: Preliminary result Specimen: Blood from Hand, Right Updated: 01/31/23 1345     Blood Culture No growth at 3 days    Narrative:      Aerobic bottle only      Blood Culture - Blood, Arm, Left [[id number]]  (Normal) Collected: 01/28/23 1328    Lab Status: Preliminary result Specimen: Blood from Arm, Left Updated: 01/31/23 1345     Blood Culture No growth at  3 days        IR Removal Tunnel CV Cath Without Port    Result Date: 1/31/2023  DATE OF EXAM: 1/31/2023 3:05 AM EST PROCEDURE: IR REMOVAL TUNNEL CV CATH WO PORT INDICATIONS: Remove HD cath COMPARISON: No Comparisons Available FLUOROSCOPIC TIME: None PHYSICIAN MONITORED CONSCIOUS SEDATION TIME: None minutes TECHNIQUE: A detailed explanation of the procedure, including the risks, benefits, and alternatives was provided. A preprocedure timeout was performed.  The patient was placed supine in the bed and the pre-existing PermCath and skin was prepped and draped in the usual sterile fashion. The skin was anesthetized with 1% lidocaine. Next, using sharp and blunt dissection the PermCath was freed from the subcutaneous tissues and removed in toto. The patient tolerated the procedure well without immediate complication. FINDINGS: See above     Impression: 1. Successful PermCath removal.  Electronically Signed: Rafael Khan  1/31/2023 4:22 PM EST  Workstation ID: [id number]    Results for orders placed during the hospital encounter of 01/03/23    Adult Transthoracic Echo Complete W/ Cont if Necessary Per Protocol    Interpretation Summary  •  Left ventricular systolic function is moderately decreased. Left ventricular ejection fraction appears to be 41 - 45%.  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  •  Mild mitral regurgitation.  •  Trace tricuspid regurgitation.  •  There is a moderate sized left pleural effusion.    I have reviewed the medications:  Scheduled Meds:apixaban, 2.5 mg, Oral, Q12H  atorvastatin, 40 mg, Oral, Nightly  bumetanide, 1 mg, Intravenous, Q12H  clopidogrel, 75 mg, Oral, Daily  clotrimazole-betamethasone, 1 application, Topical, BID  dexamethasone, 6 mg, Oral, Daily With Breakfast   Or  dexamethasone, 6 mg, Intravenous, Daily With Breakfast  [START ON 2/2/2023] gabapentin, 100 mg, Oral, Daily  insulin detemir, 30 Units, Subcutaneous, Nightly  insulin  lispro, 0-9 Units, Subcutaneous, TID With Meals  metoprolol succinate XL, 25 mg, Oral, Q24H  omeprazole, 20 mg, Oral, BID  senna-docusate sodium, 2 tablet, Oral, BID  sertraline, 50 mg, Oral, Daily  sodium chloride, 10 mL, Intravenous, Q12H      Continuous Infusions:   PRN Meds:.•  acetaminophen **OR** acetaminophen **OR** acetaminophen  •  senna-docusate sodium **AND** polyethylene glycol **AND** bisacodyl **AND** bisacodyl  •  cyclobenzaprine  •  dextrose  •  dextrose  •  glucagon (human recombinant)  •  HYDROcodone-acetaminophen  •  ondansetron **OR** ondansetron  •  sodium chloride  •  sodium chloride  •  sodium chloride    Assessment & Plan   Assessment & Plan     Active Hospital Problems    Diagnosis  POA   • **Sepsis ([facility]) [A41.9]  Yes   • COVID-19 [U07.1]  Yes   • Sleep apnea [G47.30]  Yes   • NICM (nonischemic cardiomyopathy) ([facility]) [I42.8]  Yes   • CAD [I25.10]  Yes   • Chronic combined systolic and diastolic heart failure  [I50.42]  Yes   • PVD (peripheral vascular disease) ([facility]) [I73.9]  Yes   • Diabetes mellitus with neuropathy ([facility]) [E11.40]  Yes   • Essential hypertension [I10]  Yes   • Stage 3a chronic kidney disease ([facility]) [N18.31]  Yes      Resolved Hospital Problems   No resolved problems to display.     Brief Hospital Course to date:  Susan Anderson is a 82 y.o. female with PMH significant for HTN, HLD, CAD, chronic combined systolic/diastolic CHF, paroxysmal atrial fibrillation, CKD III, PAD (s/p L SFA stent), PUD / prior GI bleeding (10/22), insulin-dependent DMII and ELIUD (on 2L NC QHS. Recently admitted to [facility] 1/3-1/20/23 after PEA arrest with subsequent ATN necessitating initiation of HD. Admitted to [facility] 1/28/23 for sepsis secondary to COVID-19, bradycardia and abnormal movements.     Sepsis, POA   COVID-19 infection  Hypothermia, bradycardia   - CT head, chest, and A/P without evidence of infection. Showed known rib fractures, pulmonary edema w/  effusions. UA not c/w infection  - s/p teo hugger for hypothermia. Temps stable now  - Remdesivir deferred due to bradycardia  - Enhanced COVID isolation through 2/17/2022  - Continue Dexamethasone 6mg PO daily     CKD3 w/ recent LACEY necessitating HD   Hyperkalemia  - Started on HD last admission - etiology felt to be ATN from cardiac arrest +/- Vancomycin toxicity   - Tunneled HD catheter placed on 1/18/23 - was on T/Th/Sat HD  - Missed HD on 1/28 due to illness  - Has made good renal recovery - HD catheter removed on 1/31/22   - Ongoing IV diuresis  - s/p Lokelma for hyperkalemia. Monitor electrolytes   - AM renal panel      Acute hypoxic respiratory insufficiency  Acute on chronic HFrEF (41-45%) w/ pleural effusions  - Multiple admissions for CHF  - No ACEI / ARB/ ARNI / Aldactone or STLT2i due to renal dysfunction  - BB held due to bradycardia - now having some tachycardia  - Restart Metoprolol at 25mg daily  - Ongoing IV diuresis - will need transition to PO at DC  - Weaned to 1L NC today - may need O2 at home    pAfib  CAD  PAD  -s/p LT SFA stenting 9/2022  - Watchman was planned due to GI bleeding - delayed due to PEA arrest. Plans for future placement unclear  - Continue Plavix, Eliquis and statin  - Resumed 1/2 dose of home Metoprolol today     DM type 2, a1c 8.1%, w/ long term use of insulin  Steroid-induced hyperglycemia  - Got Levemir 30 units this AM, give another 10 units now  - Add Lispro 5 units TID AC + SSI     Hx GIB  Chronic anemia  - Hgb stable  - Documented allergy to pantoprazole, ok to use home omeprazole     Anxiety  - Started on Sertraline 50mg this admission  - Avoid benzos as able     Expected Discharge Location and Transportation: home via private vehicle   Expected Discharge   Expected Discharge Date and Time     Expected Discharge Date Expected Discharge Time    Feb 2, 2023          DVT prophylaxis:Medical DVT prophylaxis orders are present.     AM-PAC 6 Clicks Score (PT): 16 (01/31/23  2103)    CODE STATUS:   Code Status and Medical Interventions:   Ordered at: 01/28/23 1519     Medical Intervention Limits:    NO intubation (DNI)     Code Status (Patient has no pulse and is not breathing):    No CPR (Do Not Attempt to Resuscitate)     Medical Interventions (Patient has pulse or is breathing):    Limited Support     Anna Cyrstal PA-C  02/01/23

## 2023-02-02 ENCOUNTER — READMISSION MANAGEMENT (OUTPATIENT)
Dept: CALL CENTER | Facility: HOSPITAL | Age: 83
End: 2023-02-02
Payer: MEDICARE

## 2023-02-02 VITALS
SYSTOLIC BLOOD PRESSURE: 157 MMHG | WEIGHT: 182 LBS | DIASTOLIC BLOOD PRESSURE: 87 MMHG | RESPIRATION RATE: 18 BRPM | TEMPERATURE: 97.2 F | HEART RATE: 77 BPM | HEIGHT: 64 IN | OXYGEN SATURATION: 95 % | BODY MASS INDEX: 31.07 KG/M2

## 2023-02-02 LAB
ALBUMIN SERPL-MCNC: 3 G/DL (ref 3.5–5.2)
ANION GAP SERPL CALCULATED.3IONS-SCNC: 8 MMOL/L (ref 5–15)
ANION GAP SERPL CALCULATED.3IONS-SCNC: 9 MMOL/L (ref 5–15)
BACTERIA SPEC AEROBE CULT: NORMAL
BACTERIA SPEC AEROBE CULT: NORMAL
BUN SERPL-MCNC: 48 MG/DL (ref 8–23)
BUN SERPL-MCNC: 49 MG/DL (ref 8–23)
BUN/CREAT SERPL: 40.2 (ref 7–25)
BUN/CREAT SERPL: 44.4 (ref 7–25)
CALCIUM SPEC-SCNC: 8 MG/DL (ref 8.6–10.5)
CALCIUM SPEC-SCNC: 8.4 MG/DL (ref 8.6–10.5)
CHLORIDE SERPL-SCNC: 101 MMOL/L (ref 98–107)
CHLORIDE SERPL-SCNC: 102 MMOL/L (ref 98–107)
CO2 SERPL-SCNC: 36 MMOL/L (ref 22–29)
CO2 SERPL-SCNC: 37 MMOL/L (ref 22–29)
CREAT SERPL-MCNC: 1.08 MG/DL (ref 0.57–1)
CREAT SERPL-MCNC: 1.22 MG/DL (ref 0.57–1)
DEPRECATED RDW RBC AUTO: 74.1 FL (ref 37–54)
EGFRCR SERPLBLD CKD-EPI 2021: 44.1 ML/MIN/1.73
EGFRCR SERPLBLD CKD-EPI 2021: 51.1 ML/MIN/1.73
ERYTHROCYTE [DISTWIDTH] IN BLOOD BY AUTOMATED COUNT: 21.7 % (ref 12.3–15.4)
GLUCOSE BLDC GLUCOMTR-MCNC: 91 MG/DL (ref 70–130)
GLUCOSE BLDC GLUCOMTR-MCNC: 98 MG/DL (ref 70–130)
GLUCOSE SERPL-MCNC: 112 MG/DL (ref 65–99)
GLUCOSE SERPL-MCNC: 62 MG/DL (ref 65–99)
HCT VFR BLD AUTO: 34.5 % (ref 34–46.6)
HGB BLD-MCNC: 10.3 G/DL (ref 12–15.9)
MCH RBC QN AUTO: 28.5 PG (ref 26.6–33)
MCHC RBC AUTO-ENTMCNC: 29.9 G/DL (ref 31.5–35.7)
MCV RBC AUTO: 95.3 FL (ref 79–97)
PHOSPHATE SERPL-MCNC: 3.5 MG/DL (ref 2.5–4.5)
PLATELET # BLD AUTO: 162 10*3/MM3 (ref 140–450)
PMV BLD AUTO: 10.9 FL (ref 6–12)
POTASSIUM SERPL-SCNC: 3.5 MMOL/L (ref 3.5–5.2)
POTASSIUM SERPL-SCNC: 3.6 MMOL/L (ref 3.5–5.2)
RBC # BLD AUTO: 3.62 10*6/MM3 (ref 3.77–5.28)
SODIUM SERPL-SCNC: 146 MMOL/L (ref 136–145)
SODIUM SERPL-SCNC: 147 MMOL/L (ref 136–145)
WBC NRBC COR # BLD: 6.89 10*3/MM3 (ref 3.4–10.8)

## 2023-02-02 PROCEDURE — 85027 COMPLETE CBC AUTOMATED: CPT | Performed by: PHYSICIAN ASSISTANT

## 2023-02-02 PROCEDURE — 97110 THERAPEUTIC EXERCISES: CPT

## 2023-02-02 PROCEDURE — 82962 GLUCOSE BLOOD TEST: CPT

## 2023-02-02 PROCEDURE — 63710000001 DEXAMETHASONE PER 0.25 MG: Performed by: INTERNAL MEDICINE

## 2023-02-02 PROCEDURE — 80048 BASIC METABOLIC PNL TOTAL CA: CPT | Performed by: PHYSICIAN ASSISTANT

## 2023-02-02 PROCEDURE — 80069 RENAL FUNCTION PANEL: CPT | Performed by: PHYSICIAN ASSISTANT

## 2023-02-02 PROCEDURE — 99239 HOSP IP/OBS DSCHRG MGMT >30: CPT | Performed by: PHYSICIAN ASSISTANT

## 2023-02-02 PROCEDURE — 97530 THERAPEUTIC ACTIVITIES: CPT

## 2023-02-02 RX ORDER — BUMETANIDE 1 MG/1
1 TABLET ORAL DAILY
Qty: 30 TABLET | Refills: 5 | Status: SHIPPED | OUTPATIENT
Start: 2023-02-02

## 2023-02-02 RX ORDER — DEXAMETHASONE 6 MG/1
6 TABLET ORAL
Qty: 4 TABLET | Refills: 0 | Status: SHIPPED | OUTPATIENT
Start: 2023-02-03 | End: 2023-02-07

## 2023-02-02 RX ORDER — GABAPENTIN 100 MG/1
100 CAPSULE ORAL 2 TIMES DAILY
Qty: 90 CAPSULE | Refills: 2
Start: 2023-02-02 | End: 2023-02-16 | Stop reason: SDUPTHER

## 2023-02-02 RX ORDER — HYDROXYZINE HYDROCHLORIDE 25 MG/1
25 TABLET, FILM COATED ORAL NIGHTLY PRN
Qty: 30 TABLET | Refills: 5 | Status: SHIPPED | OUTPATIENT
Start: 2023-02-02

## 2023-02-02 RX ORDER — AMOXICILLIN 250 MG
1 CAPSULE ORAL NIGHTLY PRN
Start: 2023-02-02

## 2023-02-02 RX ORDER — LOSARTAN POTASSIUM 25 MG/1
25 TABLET ORAL DAILY
Qty: 30 TABLET | Refills: 5 | Status: SHIPPED | OUTPATIENT
Start: 2023-02-02 | End: 2023-03-28

## 2023-02-02 RX ORDER — METOPROLOL SUCCINATE 25 MG/1
25 TABLET, EXTENDED RELEASE ORAL
Qty: 30 TABLET | Refills: 5 | Status: SHIPPED | OUTPATIENT
Start: 2023-02-03

## 2023-02-02 RX ORDER — GUAIFENESIN 600 MG/1
1200 TABLET, EXTENDED RELEASE ORAL 2 TIMES DAILY PRN
Start: 2023-02-02

## 2023-02-02 RX ORDER — POLYETHYLENE GLYCOL 3350 17 G/17G
17 POWDER, FOR SOLUTION ORAL DAILY PRN
Start: 2023-02-02

## 2023-02-02 RX ADMIN — SERTRALINE HYDROCHLORIDE 50 MG: 50 TABLET ORAL at 08:36

## 2023-02-02 RX ADMIN — CLOTRIMAZOLE AND BETAMETHASONE DIPROPIONATE 1 APPLICATION: 10; .5 CREAM TOPICAL at 08:38

## 2023-02-02 RX ADMIN — APIXABAN 2.5 MG: 2.5 TABLET, FILM COATED ORAL at 08:36

## 2023-02-02 RX ADMIN — GABAPENTIN 100 MG: 100 CAPSULE ORAL at 08:36

## 2023-02-02 RX ADMIN — OMEPRAZOLE 20 MG: 20 CAPSULE, DELAYED RELEASE ORAL at 08:37

## 2023-02-02 RX ADMIN — Medication 10 ML: at 08:36

## 2023-02-02 RX ADMIN — CLOPIDOGREL BISULFATE 75 MG: 75 TABLET ORAL at 08:36

## 2023-02-02 RX ADMIN — BUMETANIDE 1 MG: 0.25 INJECTION INTRAMUSCULAR; INTRAVENOUS at 00:42

## 2023-02-02 RX ADMIN — DEXAMETHASONE 6 MG: 4 TABLET ORAL at 08:36

## 2023-02-02 RX ADMIN — METOPROLOL SUCCINATE 25 MG: 25 TABLET, EXTENDED RELEASE ORAL at 08:36

## 2023-02-02 NOTE — PLAN OF CARE
Goal Outcome Evaluation:  Plan of Care Reviewed With: patient        Progress: improving  Outcome Evaluation: Vitals stable through the night. A. Fib on the monitor HR  bpm. Patient remains on 1L NC. no c/o soa or pain.

## 2023-02-02 NOTE — OUTREACH NOTE
Prep Survey    Flowsheet Row Responses   [facility] patient discharged from? [place]   Is LACE score < 7 ? No   Eligibility [facility]   Date of Admission 01/28/23   Date of Discharge 02/02/23   [place] [facility] [place]charge diagnosis Sepsis R/T COVID   Does the patient have one of the following disease processes/diagnoses(primary or secondary)? Sepsis   Does the patient have Home health ordered? Yes   What is the Home health agency?  [facility]  for PT, OT, and SN   Is there a DME ordered? Yes   What DME was ordered? 02- [facility]    Prep survey completed? Yes          IRINEO BRYANT - Registered Nurse

## 2023-02-02 NOTE — THERAPY TREATMENT NOTE
Patient Name: Susan Anderson  : 1940    MRN: [id number]                              Today's Date: 2023       Admit Date: 2023    Visit Dx:     ICD-10-CM ICD-9-CM   1. COVID-19  U07.1 079.89   2. Sepsis with encephalopathy without septic shock, due to unspecified organism ([facility])  A41.9 038.9    R65.20 995.91    G93.40 348.30   3. Adverse effect of drug, initial encounter  T50.905A E947.9   4. Hypothermia, initial encounter  T68.XXXA 991.6   5. Dizziness  R42 780.4   6. Gait disturbance  R26.9 781.2   7. Mitral valve disease  I05.9 394.9   8. Ulcer of esophagus without bleeding  K22.10 530.20   9. Paroxysmal atrial fibrillation  I48.0 427.31   10. Hypomagnesemia  E83.42 275.2   11. Effusion of right knee  M25.461 719.06   12. Demand ischemia ([facility])  I24.8 411.89   13. Weakness  R53.1 780.79   14. Sarcopenia  M62.84 728.2   15. Physical deconditioning  R53.81 799.3     Patient Active Problem List   Diagnosis   • Diabetic foot ulcer ([facility])   • PVD (peripheral vascular disease) ([facility])   • Osteomyelitis ([facility])   • Diabetes mellitus with neuropathy ([facility])   • Essential hypertension   • Stage 3a chronic kidney disease ([facility])   • Pyogenic inflammation of bone ([facility])   • Hyperglycemia   • Pneumonia due to infectious organism   • Mitral valve disease   • NICM (nonischemic cardiomyopathy) ([facility])   • CAD   • Dyslipidemia   • Chronic combined systolic and diastolic heart failure    • Lumbar stenosis with neurogenic claudication   • Spondylosis of lumbar region without myelopathy or radiculopathy   • Spondylolisthesis, lumbar region   • Degeneration of lumbar or lumbosacral intervertebral disc   • Gait disturbance   • Physical deconditioning   • Sarcopenia   • Weakness   • Anemia   • Fall   • Dizziness   • Demand ischemia ([facility])   • Effusion of right knee   • Right knee pain   • Pressure injury of skin of sacral region   • Sleep apnea   • GIB (gastrointestinal bleeding)   • Vasovagal syncope   • Hypomagnesemia   • Syncope,  unspecified syncope type   • Paroxysmal atrial fibrillation   • Ulcer of esophagus without bleeding   • T2DM    • COVID-19   • Sepsis (HCC)     Past Medical History:   Diagnosis Date   • Anxiety    • Arthritis    • Asthma 6/4 - double pneumonia    Currently on inhaler and nebulizer   • Atrial fibrillation (HCC) 08/21/2022   • Cancer (HCC)     cervical cancer, skin cancer   • CHF (congestive heart failure) (HCC) 06/04/2021   • Chronic kidney disease Related to diabetes   • Coronary artery disease 6/4/2021 DX for hear failure   • Diabetes mellitus (HCC) 30 years    Seeing Dr. Lam 1st time Aug 19   • GERD (gastroesophageal reflux disease)    • Gout    • History of staph infection 10/2021    right toe   • History of transfusion     no reactions, 1 unit, [facility]   • Hx of colonoscopy    • Hyperlipidemia Reference current labs x 2-3yrs approx   • Hypertension 30 years   • Insomnia    • Migraine    • Mitral valve disease    • Mitral valve disease    • Osteomyelitis (HCC)    • Peripheral neuropathy    • Sleep apnea    • Type 2 diabetes mellitus (HCC)     30 years     Past Surgical History:   Procedure Laterality Date   • ABDOMINAL HYSTERECTOMY W/SALPINGECTOMY     • AMPUTATION  Right great toe, 1st 1/3 metatarsal -Reg 4/14/21   • AORTAGRAM N/A 04/09/2021    Procedure: AORTAGRAM WITH OR WITHOUT RUNOFFS, WITH Co2;  Surgeon: Trey Forrest MD;  Location:  [facility];  Service: Vascular;  Laterality: N/A;   • AORTAGRAM N/A 09/28/2022    Procedure: CO2 ANGIOGRAM, LEFT SFA ANGIOPLASTY WITH DRUG ELUTING BALLOON, LEFT SFA STENT PLACEMENT ;  Surgeon: Trey Forrest MD;  Location:  [facility];  Service: Vascular;  Laterality: N/A;  FLUORO: 13.12  DOSE 162mGy  CONTRAST: Isovue 350: 15ml   • APPENDECTOMY     • BRAIN TUMOR EXCISION      laser surgery    • CARDIAC CATHETERIZATION N/A 06/21/2021    Procedure: LEFT HEART CATH;  Surgeon: Anjum Ceballos MD;  Location:  [facility];  Service: Cardiology;   Laterality: N/A;   • CATARACT EXTRACTION W/ INTRAOCULAR LENS  IMPLANT, BILATERAL     • CHOLECYSTECTOMY     • COLONOSCOPY     • ENDOSCOPY N/A 10/07/2022    Procedure: ESOPHAGOGASTRODUODENOSCOPY;  Surgeon: Stef Veras MD;  Location:  [facility];  Service: Gastroenterology;  Laterality: N/A;   • EYE SURGERY     • FEMORAL ARTERY STENT  10/2022   • HYSTERECTOMY     • INTERVENTIONAL RADIOLOGY PROCEDURE N/A 1/15/2023    Procedure: CV INTERVENTIONAL RADIOLOGY PROCEDURE;  Surgeon: Sanket Zapata MD;  Location:  [facility] CATH INVASIVE LOCATION;  Service: Interventional Radiology;  Laterality: N/A;   • TOE SURGERY     • TRANS METATARSAL AMPUTATION Right 04/14/2021    Procedure: AMPUTATION TRANS METATARSAL RIGHT GREAT TOE;  Surgeon: Valdez Finney MD;  Location:  [facility];  Service: Vascular;  Laterality: Right;      General Information     Row Name 02/02/23 1027          Physical Therapy Time and Intention    Document Type therapy note (daily note)  -NS     Mode of Treatment individual therapy;physical therapy  -NS     Row Name 02/02/23 1027          General Information    Patient Profile Reviewed yes  -NS     Existing Precautions/Restrictions fall;oxygen therapy device and L/min  -NS     Row Name 02/02/23 1027          Cognition    Orientation Status (Cognition) oriented x 3  -NS     Row Name 02/02/23 1027          Safety Issues, Functional Mobility    Safety Issues Affecting Function (Mobility) safety precaution awareness;safety precautions follow-through/compliance;sequencing abilities;judgment  -NS     Impairments Affecting Function (Mobility) balance;coordination;endurance/activity tolerance;motor planning;strength;postural/trunk control  -NS           User Key  (r) = Recorded By, (t) = Taken By, (c) = Cosigned By    Initials Name Provider Type    NS Debbie Kraft PT Physical Therapist               Mobility     Row Name 02/02/23 1027          Bed Mobility    Bed Mobility supine-sit  -NS     Supine-Sit  [place] (Bed Mobility) minimum assist (75% patient effort);verbal cues  -NS     Assistive Device (Bed Mobility) bed rails;head of bed elevated  -NS     Comment, (Bed Mobility) Assist at trunk  -NS     Row Name 02/02/23 1027          Transfers    Comment, (Transfers) VCs for hand placement. Pt demonstrates unsteadiness with transfer, demonstrating mild B knee buckling and difficulty advancing each LE. She requires cues for safety and turning fully prior to sitting. She practiced several STS from chair with RW.  -NS     Row Name 02/02/23 1027          Bed-Chair Transfer    Bed-Chair [place] (Transfers) minimum assist (75% patient effort);2 person assist  -NS     Assistive Device (Bed-Chair Transfers) other (see comments)  BUE support  -NS     Row Name 02/02/23 1027          Sit-Stand Transfer    Sit-Stand [place] (Transfers) minimum assist (75% patient effort);verbal cues  -NS     Assistive Device (Sit-Stand Transfers) walker, front-wheeled  -NS           User Key  (r) = Recorded By, (t) = Taken By, (c) = Cosigned By    Initials Name Provider Type    NS Debbie Kraft PT Physical Therapist               Obj/Interventions     Row Name 02/02/23 1027          Motor Skills    Therapeutic Exercise hip;knee;ankle  -NS     Row Name 02/02/23 1027          Hip (Therapeutic Exercise)    Hip (Therapeutic Exercise) strengthening exercise  -NS     Hip Strengthening (Therapeutic Exercise) other (see comments);bilateral;aBduction;aDduction;external rotation;internal rotation;marching while seated;15 repititions  5x STS  -NS     Row Name 02/02/23 1027          Knee (Therapeutic Exercise)    Knee (Therapeutic Exercise) strengthening exercise  -NS     Knee Strengthening (Therapeutic Exercise) bilateral;SLR (straight leg raise);LAQ (long arc quad);heel slides;15 repititions  -NS     Row Name 02/02/23 1027          Ankle (Therapeutic Exercise)    Ankle (Therapeutic Exercise) AROM (active range of motion)  -NS     Ankle  AROM (Therapeutic Exercise) bilateral;dorsiflexion;plantarflexion;15 repititions  -NS     Row Name 02/02/23 1027          Balance    Balance Assessment sitting static balance;sitting dynamic balance;standing static balance;standing dynamic balance  -NS     Static Sitting Balance standby assist  -NS     Dynamic Sitting Balance standby assist  -NS     Position, Sitting Balance unsupported;sitting in chair  -NS     Static Standing Balance contact guard  -NS     Dynamic Standing Balance minimal assist;2-person assist  -NS     Position/Device Used, Standing Balance supported  -NS           User Key  (r) = Recorded By, (t) = Taken By, (c) = Cosigned By    Initials Name Provider Type    Debbie Green PT Physical Therapist               Goals/Plan    No documentation.                Clinical Impression     Row Name 02/02/23 1027          Pain    Pretreatment Pain Rating 0/10 - no pain  -NS     Posttreatment Pain Rating 0/10 - no pain  -NS     Row Name 02/02/23 1027          Plan of Care Review    Plan of Care Reviewed With patient  -NS     Progress improving  -NS     Outcome Evaluation Patient participated well with PT but continues to be limited by weakness, decreased activity tolerance, and balance impairments. She would continue to benefit from IP PT services to support return to baseline mobility.  -NS     Row Name 02/02/23 1027          Vital Signs    Pre SpO2 (%) 94  -NS     O2 Delivery Pre Treatment nasal cannula  -NS     Intra SpO2 (%) 92  -NS     O2 Delivery Intra Treatment nasal cannula  -NS     Post SpO2 (%) 95  -NS     O2 Delivery Post Treatment nasal cannula  -NS     Pre Patient Position Supine  -NS     Intra Patient Position Standing  -NS     Post Patient Position Sitting  -NS     Row Name 02/02/23 1027          Positioning and Restraints    Pre-Treatment Position in bed  -NS     Post Treatment Position chair  -NS     In Chair notified nsg;reclined;call light within reach;encouraged to call for assist;legs  elevated;waffle cushion;heels elevated;exit alarm on  -NS           User Key  (r) = Recorded By, (t) = Taken By, (c) = Cosigned By    Initials Name Provider Type    Debbie Green PT Physical Therapist               Outcome Measures     Row Name 02/02/23 1027          How much help from another person do you currently need...    Turning from your back to your side while in flat bed without using bedrails? 4  -NS     Moving from lying on back to sitting on the side of a flat bed without bedrails? 2  -NS     Moving to and from a bed to a chair (including a wheelchair)? 2  -NS     Standing up from a chair using your arms (e.g., wheelchair, bedside chair)? 3  -NS     Climbing 3-5 steps with a railing? 1  -NS     To walk in hospital room? 2  -NS     AM-PAC 6 Clicks Score (PT) 14  -NS     Highest level of mobility 4 --> Transferred to chair/commode  -NS     Row Name 02/02/23 1027          Functional Assessment    Outcome Measure Options AM-PAC 6 Clicks Basic Mobility (PT)  -NS           User Key  (r) = Recorded By, (t) = Taken By, (c) = Cosigned By    Initials Name Provider Type    Debbie Green PT Physical Therapist                             Physical Therapy Education     Title: PT OT SLP Therapies (In Progress)     Topic: Physical Therapy (Done)     Point: Mobility training (Done)     Learning Progress Summary           Patient Acceptance, E, VU,NR by NS at 2/2/2023 1148    Acceptance, E, VU,NR by NS at 1/31/2023 1334    Comment: safety with transfers, benefits of OOB activity, and HEP                   Point: Home exercise program (Done)     Learning Progress Summary           Patient Acceptance, E, VU,NR by NS at 2/2/2023 1148    Acceptance, E, VU,NR by NS at 1/31/2023 1334    Comment: safety with transfers, benefits of OOB activity, and HEP                   Point: Body mechanics (Done)     Learning Progress Summary           Patient Acceptance, E, VU,NR by NS at 2/2/2023 1148    Acceptance, E, VU,NR by NS  at 1/31/2023 1334    Comment: safety with transfers, benefits of OOB activity, and HEP                   Point: Precautions (Done)     Learning Progress Summary           Patient Acceptance, E, VU,NR by NS at 2/2/2023 1148    Acceptance, E, VU,NR by NS at 1/31/2023 1334    Comment: safety with transfers, benefits of OOB activity, and HEP                               User Key     Initials Effective Dates Name Provider Type Discipline    NS 06/16/21 -  Debbie Kraft PT Physical Therapist PT              PT Recommendation and Plan  Planned Therapy Interventions (PT): balance training, bed mobility training, home exercise program, neuromuscular re-education, patient/family education, strengthening, transfer training  Plan of Care Reviewed With: patient  Progress: improving  Outcome Evaluation: Patient participated well with PT but continues to be limited by weakness, decreased activity tolerance, and balance impairments. She would continue to benefit from IP PT services to support return to baseline mobility.     Time Calculation:    PT Charges     Row Name 02/02/23 1027             Time Calculation    Start Time 1027  -NS      PT Received On 02/02/23  -NS      PT Goal Re-Cert Due Date 02/10/23  -NS         Timed Charges    21290 - PT Therapeutic Exercise Minutes 15  -NS      98201 - PT Therapeutic Activity Minutes 14  -NS         Total Minutes    Timed Charges Total Minutes 29  -NS       Total Minutes 29  -NS            User Key  (r) = Recorded By, (t) = Taken By, (c) = Cosigned By    Initials Name Provider Type    Debbie Green PT Physical Therapist              Therapy Charges for Today     Code Description Service Date Service Provider Modifiers Qty    19329930457 HC PT THERAPEUTIC ACT EA 15 MIN 2/2/2023 Debbie Kraft, PT GP 1    63341315402 HC PT THER PROC EA 15 MIN 2/2/2023 Debbie Kraft, PT GP 1    41431488302 HC PT THER SUPP EA 15 MIN 2/2/2023 Debbie Kraft, PT GP 2          PT G-Codes  Outcome Measure  Options: AM-PAC 6 Clicks Basic Mobility (PT)  AM-PAC 6 Clicks Score (PT): 14  AM-PAC 6 Clicks Score (OT): 16  PT Discharge Summary  Anticipated Discharge Disposition (PT): home with 24/7 care, home with home health    Debbie Kraft, PT  2/2/2023     Topical Retinoid Pregnancy And Lactation Text: This medication is Pregnancy Category C. It is unknown if this medication is excreted in breast milk.

## 2023-02-02 NOTE — PROGRESS NOTES
Enter Query Response Below      Query Response:       NSTEMI II due to inc demand from COVID       If applicable, please update the problem list.         Patient: Susan Anderson        : 1940  Account: [id number]           Admit Date: 1/3/2023        How to Respond to this query:       a. Click New Note     b. Answer query within the yellow box.                c. Update the Problem List, if applicable.      If you have any questions about this query contact me at: [email or web address]    ,    82 year old female had PEA cardiac arrest  during EGD on 1/3/23. Troponin was 0.263 and 0.246 on 1/3. Infectious Disease notes 1/3 -  and Hospitalist note  -  documented NSTEMI.   The patient has documented history of coronary artery disease. Cardiology consultant cited left heart catheterization from 2021 noting mild to moderate nonobstructive CAD involving multiple vessels without evidence of severe or critical disease. NSTEMI is not mentioned per the Cardiologist. No heart catheterization was done during the current encounter.  The patient was resuscitated per ACLS protocol and monitored in the ICU. NSTEMI was not included on the discharge summary.    Please clarify NSTEMI as:    NSTEMI type II due to __________(please specify etiology(ies)  NSTEMI was ruled out after study  Other, please specify___________  Unable to clinically determine    By submitting this query, we are merely seeking further clarification of documentation to accurately reflect all conditions that you are monitoring, evaluating, treating or that extend the hospitalization or utilize additional resources of care. Please utilize your independent clinical judgment when addressing the question(s) above.     This query and your response, once completed, will be entered into the legal medical record.    Sincerely,  Brinda Sanchez RN CCDS  Clinical Documentation Integrity Program

## 2023-02-02 NOTE — CASE MANAGEMENT/SOCIAL WORK
Case Management Discharge Note      Final Note: Patient getting discharge today. Discharge plan is home with [facility]  for PT, OT, and SN. CM faxed discharge summary over and new referral with lab drawn for the 14th. CM order oxygen from [facility]. No other discharge needs.         Selected Continued Care - Admitted Since 1/28/2023     Destination    No services have been selected for the patient.              Durable Medical Equipment     Service Provider Selected Services Address Phone Fax Patient Preferred    [facility] [address], [facility] [id number] [phone] --          Dialysis/Infusion    No services have been selected for the patient.              Home Medical Care    No services have been selected for the patient.              Therapy    No services have been selected for the patient.              Community Resources    No services have been selected for the patient.              Community & DME    No services have been selected for the patient.                Selected Continued Care - Prior Encounters Includes continued care and service providers with selected services from prior encounters from 10/30/2022 to 2/2/2023    Discharged on 1/20/2023 Admission date: 1/3/2023 - Discharge disposition: Home-Health Care [facility]    Dialysis/Infusion     Service Provider Selected Services Address Phone Fax Patient Preferred    [facility] [address]  , [place] [id number] [phone] --          Home Medical Care     Service Provider Selected Services Address Phone Fax Patient Preferred    [facility] [address] [facility] [id number] [phone] --       Internal Comment last updated by Johanny Epps, RN 1/20/2023 1405    [facility]                                     Final Discharge Disposition Code: 06 - home with home health  care

## 2023-02-02 NOTE — PLAN OF CARE
Goal Outcome Evaluation:  Plan of Care Reviewed With: patient        Progress: improving  Outcome Evaluation: Patient participated well with PT but continues to be limited by weakness, decreased activity tolerance, and balance impairments. She would continue to benefit from IP PT services to support return to baseline mobility.

## 2023-02-02 NOTE — NURSING NOTE
Pt discharged with new o2 tank.  Discharge meds reviewed with pts dtr.   Pt discharged at this time.

## 2023-02-02 NOTE — DISCHARGE PLACEMENT REQUEST
"Susan Anderson (82 y.o. Female)     Date of Birth   1940    Social Security Number       Address   [address] [address]    Home Phone   [phone]    MRN   [id number]       Islam   Restorationism    Marital Status                               Admission Date   1/28/23    Admission Type   Emergency    Admitting Provider   Breezy Felder MD    Attending Provider   Breezy Felder MD    Department, Room/Bed   [facility] 6B, N634/1       Discharge Date       Discharge Disposition   Home or Self Care    Discharge Destination                               Attending Provider: Breezy Felder MD    Allergies: Vancomycin, Baclofen, Cephalexin, Erythromycin Base, Melatonin, Oxycodone, Protonix [Pantoprazole], Sulfa Antibiotics    Isolation: Contact Air   Infection: MRSA (07/27/22), COVID (confirmed) (01/28/23)   Code Status: No CPR    Ht: 162.6 cm (64\")   Wt: 82.6 kg (182 lb)    Admission Cmt: None   Principal Problem: Sepsis (HCC) [A41.9]                 Active Insurance as of 1/28/2023     Primary Coverage     Payor Plan Insurance Group Employer/Plan Group    [employer] MEDICARE REPLACEMENT UNITED [facility] MEDICARE REPLACEMENT [id number]     Payor Plan Address Payor Plan Phone Number Payor Plan Fax Number Effective Dates    [address] [address]   1/1/2021 - None Entered    [facility] [id number]       Subscriber Name Subscriber Birth Date Member ID       SUSAN NADERSON 1940 [id number]                 Emergency Contacts      (Rel.) Home Phone Work Phone Mobile Phone    GREG ANDERSON (Daughter) [phone] -- [phone]    MARQUITA BARRIOS (Relative) -- -- [phone]                 Discharge Summary      Anna Crystal PA-C at 02/02/23 1250             [address]  [address] [facility] [address]  Phone:  [phone]  Fax:  [phone] Date: Feb 2, 2023      Ambulatory Referral to Home Health     Patient:  Susan AKBAR " Justin MRN:  [id number]   [address] [facility] [address] :  1940  SSN:    Phone: [phone] Sex:  F      INSURANCE PAYOR PLAN GROUP # SUBSCRIBER ID   Primary:    [employer] MEDICARE REPLACEMENT [id number]      Referring Provider Information:  PEDRO KUHN Phone: [phone] Fax: [phone]       Referral Information:   # Visits:  999 Referral Type: Home Health [42]   Urgency:  Routine Referral Reason: Specialty Services Required   Start Date: 2023 End Date:  To be determined by Insurer   Diagnosis: Hyperglycemia (R73.9 [ICD-10-CM] 790.29 [ICD-9-CM])  NICM (nonischemic cardiomyopathy) (HCC) (I42.8 [ICD-10-CM] 425.4 [ICD-9-CM])  Chronic combined systolic and diastolic heart failure (HCC) (I50.42 [ICD-10-CM] 428.42 [ICD-9-CM])  Lumbar stenosis with neurogenic claudication (M48.062 [ICD-10-CM] 724.03 [ICD-9-CM])  Spondylosis of lumbar region without myelopathy or radiculopathy (M47.816 [ICD-10-CM] 721.3 [ICD-9-CM])  Degeneration of lumbar or lumbosacral intervertebral disc (M51.37 [ICD-10-CM] 722.52 [ICD-9-CM])  Anemia, chronic disease (D63.8 [ICD-10-CM] 285.29 [ICD-9-CM])  PVD (peripheral vascular disease) (HCC) (I73.9 [ICD-10-CM] 443.9 [ICD-9-CM])  Stage 3a chronic kidney disease (HCC) (N18.31 [ICD-10-CM] 585.3 [ICD-9-CM])  Dyslipidemia (E78.5 [ICD-10-CM] 272.4 [ICD-9-CM])  Spondylolisthesis, lumbar region (M43.16 [ICD-10-CM] 738.4 [ICD-9-CM])      Refer to Dept:   Refer to Provider:   Refer to Provider Phone:   Refer to Facility:    Patient will need Labs drawn on the .  Face to Face Visit Date: 2023  Follow-up provider for Plan of Care? I treated the patient in an acute care facility and will not continue treatment after discharge.  Follow-up provider: KOLBY HO [[id number]]  Reason/Clinical Findings: S/P hospital stay  Describe mobility limitations that make leaving home difficult: Impaired gait, balance, endurance, and  mobility  Nursing/Therapeutic Services Requested: Skilled Nursing  Nursing/Therapeutic Services Requested: Physical Therapy  Nursing/Therapeutic Services Requested: Occupational Therapy  Skilled nursing orders: Other (Labs on  )  Skilled nursing orders: Telehealth  PT orders: Strengthening  PT orders: Home safety assessment  PT orders: Transfer training  PT orders: Gait Training  Weight Bearing Status: As Tolerated  Occupational orders: Activities of daily living  Occupational orders: Energy conservation  Occupational orders: Strengthening  Frequency: 1 Week 1     This document serves as a request of services and does not constitute Insurance authorization or approval of services.  To determine eligibility, please contact the members Insurance carrier to verify and review coverage.     If you have medical questions regarding this request for services. Please contact [address] at [phone] during normal business hours.        Verbal Order Mode: Verbal with readback   Authorizing Provider: Anna Crystal PA-C  Authorizing Provider's NPI: [id number]     Order Entered By: Randall Garrison RN 2023 12:57 PM     Electronically signed by: Anna Crystal PA-C 2023  2:17 PM           [facility] Medicine Services  DISCHARGE SUMMARY    Patient Name: Susan Anderson  : 1940  MRN: [id number]    Date of Admission: 2023 11:33 AM  Date of Discharge: 2023  Primary Care Physician: Arleen Doherty MD    Consults     Date and Time Order Name Status Description    2023  3:54 PM Inpatient Nephrology Consult Completed     2023 12:32 AM Inpatient Infectious Diseases Consult Completed     2023 11:33 AM Inpatient Cardiothoracic Surgery Consult Completed     1/3/2023  3:15 PM Inpatient Cardiology Consult Completed     1/3/2023 12:33 PM Inpatient Palliative Care MD Consult Completed         Hospital Course     Presenting Problem:    COVID-19 [U07.1]    Active Hospital Problems    Diagnosis  POA   • **Sepsis ([facility]) [A41.9]  Yes   • COVID-19 [U07.1]  Yes   • Sleep apnea [G47.30]  Yes   • NICM (nonischemic cardiomyopathy) ([facility]) [I42.8]  Yes   • CAD [I25.10]  Yes   • Chronic combined systolic and diastolic heart failure  [I50.42]  Yes   • PVD (peripheral vascular disease) ([facility]) [I73.9]  Yes   • Diabetes mellitus with neuropathy ([facility]) [E11.40]  Yes   • Essential hypertension [I10]  Yes   • Stage 3a chronic kidney disease ([facility]) [N18.31]  Yes      Resolved Hospital Problems   No resolved problems to display.      Hospital Course:  Susan Anderson is a 82 y.o. female with PMH significant for HTN, HLD, CAD, chronic combined systolic/diastolic CHF, paroxysmal atrial fibrillation, CKD III, PAD (s/p L SFA stent), PUD / prior GI bleeding (10/22), insulin-dependent DMII and ELIUD (on 2L NC QHS. Recently admitted to [facility] 1/3-1/20/23 after PEA arrest with subsequent ATN necessitating initiation of HD. Admitted to [facility] 1/28/23 for sepsis secondary to COVID-19, bradycardia and abnormal movements.      Sepsis, POA   COVID-19 infection  Hypothermia, bradycardia, abnormal movements  - CT head, chest, and A/P without evidence of infection. Showed known rib fractures, pulmonary edema w/ effusions. UA not c/w infection  - s/p teo hugger for hypothermia. Temps stable now  - Remdesivir deferred due to bradycardia  - Enhanced COVID isolation through 2/17/2022  - Continue Dexamethasone 6mg PO daily to complete total 10 days      CKD3 w/ recent LACEY necessitating HD   Hyperkalemia  - Started on HD last admission - etiology felt to be ATN from cardiac arrest +/- Vancomycin toxicity   - Tunneled HD catheter placed on 1/18/23 - was on T/Th/Sat HD  - Missed HD on 1/28 due to illness  - Has made good renal recovery - HD catheter removed on 1/31/22   - s/p Lokelma for hyperkalemia  - Labs stable. Follow up with nephrology arranged for  2/20/23 @ 11:45am   -  to draw BMP on 2/14/23  - AM renal panel      Acute hypoxic respiratory insufficiency  Acute on chronic HFrEF (41-45%) w/ pleural effusions  Essential hypertension  - Multiple admissions for CHF  - No ARNI / Aldactone or STLT2i due to renal dysfunction  - BB held due to bradycardia - now having some tachycardia  - s/p IV diuresis. Mild hypernatremia today - discussed with nephrology. Likely volume depletion - encouraged PO hydration   - Start Bumex 1mg PO daily at DC. No diuretics today   - Reduced Metoprolol XL to 25mg daily. Start Losartan 25mg PO daily  - [facility] in 1 week  - Remains on 1L NC - will DC on home O2      pAfib  CAD  PAD  -s/p LT SFA stenting 9/2022  - Watchman was planned due to GI bleeding - delayed due to PEA arrest. Plans for future placement unclear  - Continue Plavix, Eliquis, Metoprolol and statin     DM type 2, a1c 8.1%, w/ long term use of insulin  Steroid-induced hyperglycemia  - Resume home insulin regimen at DC      Hx GIB  Chronic anemia  - Hgb stable  - Continue home Omeprazole     Anxiety/insomnia  - Good response to Hydroxyzine last night- will send home with PRN Rx for sleep     Day of Discharge     HPI:   Sitting up in chair, daughter at bedside. Reports she slept well last night. Denies chest pain or dyspnea. No abdominal pain, nausea or vomiting. Feels well and wants to go home today. Daughter is comfortable with plan - would like spirometer for home. Patient expresses she still wants Watchman placed and asks if she'll have to wait 3 months. We discussed     Review of Systems  Gen- No fevers, chills  CV- No chest pain, palpitations  Resp- No cough, dyspnea  GI- No N/V/D, abd pain    Vital Signs:   Temp:  [97.2 °F (36.2 °C)-98.2 °F (36.8 °C)] 97.2 °F (36.2 °C)  Heart Rate:  [77-95] 77  Resp:  [16-18] 18  BP: (152-171)/(86-99) 157/87  Flow (L/min):  [1] 1    Physical Exam:  Constitutional: No acute distress, awake, alert and  conversant. Sitting in chair   HENT: NCAT, mucous membranes moist  Respiratory: Mild bibasilar rales without wheezes or rhonchi, respiratory effort normal, O2 sat 97% 1L NC  Cardiovascular: RRR, no murmurs, rubs, or gallops  Gastrointestinal: Positive bowel sounds, soft, nontender, nondistended  Musculoskeletal: No bilateral ankle edema  Psychiatric: Appropriate affect, cooperative  Neurologic: Oriented x 3, moves all extremities spontaneously without focal deficits, speech clear    Pertinent  and/or Most Recent Results     LAB RESULTS:      Lab 02/02/23 0429 02/01/23  0454 01/30/23  0514 01/29/23  0804 01/28/23  1218 01/28/23  1203   WBC 6.89 7.52 8.04 6.31  --  5.76   HEMOGLOBIN 10.3* 9.8* 9.2* 10.2*  --  9.9*   HEMOGLOBIN, POC  --   --   --   --  11.6*  --    HEMATOCRIT 34.5 32.8* 31.6* 35.3  --  34.7   HEMATOCRIT POC  --   --   --   --  34*  --    PLATELETS 162 149 133* 118*  --  91*   NEUTROS ABS  --  6.52 7.03* 5.62  --  4.34   IMMATURE GRANS (ABS)  --  0.17* 0.10* 0.06*  --  0.05   LYMPHS ABS  --  0.45* 0.47* 0.47*  --  0.73   MONOS ABS  --  0.37 0.44 0.15  --  0.59   EOS ABS  --  0.00 0.00 0.00  --  0.02   MCV 95.3 95.3 97.5* 97.0  --  97.7*   SED RATE  --   --   --   --   --  19   CRP  --   --   --   --   --  1.80*   LACTATE  --   --   --   --   --  1.2   LDH  --   --   --   --   --  486*         Lab 02/02/23  0955 02/02/23  0429 02/01/23  0928 01/31/23  0625 01/30/23  0514 01/28/23  1218 01/28/23  1203   SODIUM 146* 147* 141 140 136   < > 138   POTASSIUM 3.6 3.5 4.2 4.9 5.4*   < > 5.2   CHLORIDE 101 102 99 101 100   < > 101   CO2 37.0* 36.0* 32.0* 31.0* 28.0   < > 30.0*   ANION GAP 8.0 9.0 10.0 8.0 8.0   < > 7.0   BUN 48* 49* 47* 39* 38*   < > 24*   CREATININE 1.08* 1.22* 1.59* 1.79* 1.96*   < > 1.91*   EGFR 51.1* 44.1* 32.1* 27.9* 25.0*   < > 25.8*   GLUCOSE 62* 112* 273* 303* 400*   < > 112*   CALCIUM 8.4* 8.0* 8.2* 8.3* 8.4*   < > 8.3*   MAGNESIUM  --   --   --   --   --   --  1.9   PHOSPHORUS  --  3.5   --   --   --   --   --    TSH  --   --   --   --  0.480  --   --     < > = values in this interval not displayed.         Lab 02/02/23  0429 01/29/23  0804 01/28/23  1203   TOTAL PROTEIN  --  5.5* 5.8*   ALBUMIN 3.0* 3.3* 3.2*   GLOBULIN  --  2.2 2.6   ALT (SGPT)  --  32 30   AST (SGOT)  --  41* 45*   BILIRUBIN  --  0.9 0.7   ALK PHOS  --  244* 247*         Lab 01/28/23  1203   TROPONIN T 0.221*     Brief Urine Lab Results  (Last result in the past 365 days)      Color   Clarity   Blood   Leuk Est   Nitrite   Protein   CREAT   Urine HCG        01/28/23 1203 Dark Yellow   Turbid   Small (1+)   Negative   Negative   >=300 mg/dL (3+)               Microbiology Results (last 10 days)     Procedure Component Value - Date/Time    Blood Culture - Blood, Arm, Left [[id number]]  (Normal) Collected: 01/28/23 1328    Lab Status: Preliminary result Specimen: Blood from Arm, Left Updated: 02/01/23 1345     Blood Culture No growth at 4 days    Blood Culture - Blood, Hand, Right [[id number]]  (Normal) Collected: 01/28/23 1321    Lab Status: Preliminary result Specimen: Blood from Hand, Right Updated: 02/01/23 1345     Blood Culture No growth at 4 days    Narrative:      Aerobic bottle only      COVID PRE-OP / PRE-PROCEDURE SCREENING ORDER (NO ISOLATION) - Swab, Nasopharynx [[id number]]  (Abnormal) Collected: 01/28/23 1204    Lab Status: Final result Specimen: Swab from Nasopharynx Updated: 01/28/23 1307    Narrative:      The following orders were created for panel order COVID PRE-OP / PRE-PROCEDURE SCREENING ORDER (NO ISOLATION) - Swab, Nasopharynx.  Procedure                               Abnormality         Status                     ---------                               -----------         ------                     COVID-19 and FLU A/B PCR...[[id number]]  Abnormal            Final result                 Please view results for these tests on the individual orders.    COVID-19 and FLU A/B PCR - Swab, Nasopharynx [[id number]]   (Abnormal) Collected: 01/28/23 1204    Lab Status: Final result Specimen: Swab from Nasopharynx Updated: 01/28/23 1307     COVID19 Detected     Influenza A PCR Not Detected     Influenza B PCR Not Detected    Narrative:      Fact sheet for providers: https://www.fda.gov/media/989402/download    Fact sheet for patients: https://www.fda.gov/media/422629/download    Test performed by PCR.  Influenza A and Influenza B negative results should be considered presumptive in samples that have a positive SARS-CoV-2 result.    Competitive inhibition studies showed that SARS-CoV-2 virus, when present at concentrations above 3.6E+04 copies/mL, can inhibit the detection and amplification of influenza A and influenza B virus RNA if present at or below 1.8E+02 copies/mL or 4.9E+02 copies/mL, respectively, and may lead to false negative influenza virus results. If co-infection with influenza A or influenza B virus is suspected in samples with a positive SARS-CoV-2 result, the sample should be re-tested with another FDA cleared, approved, or authorized influenza test, if influenza virus detection would change clinical management.        CT Abdomen Pelvis Without Contrast    Result Date: 1/28/2023  CT CHEST WO CONTRAST DIAGNOSTIC, CT ABDOMEN PELVIS WO CONTRAST Date of Exam: 1/28/2023 12:31 PM EST Indication: altered. Comparison: CT chest 6/1/2021, CT abdomen and pelvis 10/6/2022 Technique: Axial CT images were obtained of the chest, abdomen, and pelvis without contrast administration.  Reconstructed coronal and sagittal images were also obtained. Automated exposure control and iterative construction methods were used. Findings: Chest: There is hypodense blood pool compatible with anemia. There is moderate atherosclerosis of the thoracic aorta and aortic arch branch vessels. There are mitral annular calcifications and scattered coronary artery calcifications. No pericardial effusion. There is multichamber cardiac enlargement. A  tunneled right IJ dialysis catheter terminates at the cavoatrial junction. No bulky or pathologic-appearing mediastinal or hilar lymph nodes. There are couple prominent calcified mediastinal and right hilar lymph nodes compatible sequelae of healed granulomatous disease. Mildly patulous thoracic esophagus. Mild diffuse subcutaneous fat stranding of the lower chest wall soft tissues compatible with anasarca. The trachea and mainstem bronchi are patent. Expiratory configuration of the trachea and mainstem bronchi. There is bronchial wall thickening of the central and lower lobe airways. There is intralobular septal thickening and perihilar and bibasilar centrilobular groundglass, with small to moderate bilateral pleural effusions and associated bibasilar consolidative densities compatible with adjacent passive atelectasis. No pneumothorax. No lung mass. There are acute or subacute appearing minimally displaced fractures of the posterolateral right fifth and sixth ribs (series 4 image 49). Abdomen and pelvis: Grossly unremarkable appearance of the liver. The gallbladder is surgically absent. Normal appearance of the bile ducts. Unremarkable appearance of the spleen, pancreas, adrenal glands, and kidneys. Similar mild nonspecific perinephric fat stranding. Normal appearance of the ureters and bladder. The uterus is surgically absent. There is a small sliding hiatal hernia. Mild colonic stool burden. No bowel obstruction. No definite inflammatory change of the GI tract. Small scattered nonorganized free fluid in the right paracolic gutter. There is atherosclerosis of the abdominal aorta without evidence of aneurysm. No lymphadenopathy. Diffuse subcutaneous fat stranding of the body wall soft tissues compatible with anasarca. No acute or suspicious bony findings in the abdomen and pelvis.     Impression: Small-to-moderate bilateral pleural effusions and interstitial and parenchymal densities compatible with pulmonary  edema. There is evidence of multichamber cardiac enlargement and right-sided dialysis catheter. There is diffuse anasarca and small nonorganized free fluid in the abdomen compatible with fluid overload state. Chronic for hypoxia as etiology of altered mental status. Acute or subacute appearing minimally displaced fractures of the posterolateral right fifth and sixth ribs. Correlate for any recent fall/trauma. No pneumothorax. Electronically Signed: Kings Baez  1/28/2023 1:23 PM EST  Workstation ID: [id number]    CT Head Without Contrast    Result Date: 1/28/2023  CT HEAD WO CONTRAST Date of Exam: 1/28/2023 12:31 PM EST Indication: altered mental status. Comparison: Head CT 10/6/2022 Technique: Axial CT images were obtained of the head without contrast administration.  Reconstructed coronal and sagittal images were also obtained. Automated exposure control and iterative construction methods were used. Findings: Exam is limited owing to motion degradation required multiple rescans. Allowing for this limitation, no evidence of acute intracranial hemorrhage. No acute large territory infarct. There are scattered subcortical and periventricular white matter hypodensities which are nonspecific and can be seen in the setting of chronic small vessel ischemic change. No extra-axial collections. No midline shift or herniation. Normal size and configuration of the ventricles. Unremarkable appearance of the orbits. No acute osseous findings. The paranasal sinuses and mastoid air cells are clear.     Impression: Limited exam owing to extensive motion artifact. Within this limitation, no acute intracranial findings. Chronic and senescent changes as detailed above. Electronically Signed: Kings Baez  1/28/2023 1:08 PM EST  Workstation ID: [id number]    CT Chest Without Contrast Diagnostic    Result Date: 1/28/2023  CT CHEST WO CONTRAST DIAGNOSTIC, CT ABDOMEN PELVIS WO CONTRAST Date of Exam: 1/28/2023 12:31 PM EST Indication:  altered. Comparison: CT chest 6/1/2021, CT abdomen and pelvis 10/6/2022 Technique: Axial CT images were obtained of the chest, abdomen, and pelvis without contrast administration.  Reconstructed coronal and sagittal images were also obtained. Automated exposure control and iterative construction methods were used. Findings: Chest: There is hypodense blood pool compatible with anemia. There is moderate atherosclerosis of the thoracic aorta and aortic arch branch vessels. There are mitral annular calcifications and scattered coronary artery calcifications. No pericardial effusion. There is multichamber cardiac enlargement. A tunneled right IJ dialysis catheter terminates at the cavoatrial junction. No bulky or pathologic-appearing mediastinal or hilar lymph nodes. There are couple prominent calcified mediastinal and right hilar lymph nodes compatible sequelae of healed granulomatous disease. Mildly patulous thoracic esophagus. Mild diffuse subcutaneous fat stranding of the lower chest wall soft tissues compatible with anasarca. The trachea and mainstem bronchi are patent. Expiratory configuration of the trachea and mainstem bronchi. There is bronchial wall thickening of the central and lower lobe airways. There is intralobular septal thickening and perihilar and bibasilar centrilobular groundglass, with small to moderate bilateral pleural effusions and associated bibasilar consolidative densities compatible with adjacent passive atelectasis. No pneumothorax. No lung mass. There are acute or subacute appearing minimally displaced fractures of the posterolateral right fifth and sixth ribs (series 4 image 49). Abdomen and pelvis: Grossly unremarkable appearance of the liver. The gallbladder is surgically absent. Normal appearance of the bile ducts. Unremarkable appearance of the spleen, pancreas, adrenal glands, and kidneys. Similar mild nonspecific perinephric fat stranding. Normal appearance of the ureters and bladder.  The uterus is surgically absent. There is a small sliding hiatal hernia. Mild colonic stool burden. No bowel obstruction. No definite inflammatory change of the GI tract. Small scattered nonorganized free fluid in the right paracolic gutter. There is atherosclerosis of the abdominal aorta without evidence of aneurysm. No lymphadenopathy. Diffuse subcutaneous fat stranding of the body wall soft tissues compatible with anasarca. No acute or suspicious bony findings in the abdomen and pelvis.     Impression: Small-to-moderate bilateral pleural effusions and interstitial and parenchymal densities compatible with pulmonary edema. There is evidence of multichamber cardiac enlargement and right-sided dialysis catheter. There is diffuse anasarca and small nonorganized free fluid in the abdomen compatible with fluid overload state. Chronic for hypoxia as etiology of altered mental status. Acute or subacute appearing minimally displaced fractures of the posterolateral right fifth and sixth ribs. Correlate for any recent fall/trauma. No pneumothorax. Electronically Signed: Kings Baez  1/28/2023 1:23 PM EST  Workstation ID: [id number]    XR Chest 1 View    Result Date: 1/28/2023  XR CHEST 1 VW Date of Exam: 1/28/2023 11:42 AM EST Indication: Weak/Dizzy/AMS triage protocol. Comparison: 1/18/2023, 1/28/2023 Findings: There are no airspace consolidations. Moderate bilateral pleural effusions present with overlying atelectasis. No pneumothorax. The pulmonary vasculature appears indistinct. The heart appears enlarged. Right internal jugular PermCath with the tip the cavoatrial junction. No acute osseous abnormality identified.     Impression: Moderate pulmonary edema pattern with moderate bilateral pleural effusions with overlying atelectasis. Superimposed pneumonia cannot be excluded. No significant changes compared to the previous study. Electronically Signed: Karen Rey  1/28/2023 1:07 PM EST  Workstation ID: [id number]    IR  Removal Tunnel CV Cath Without Port    Result Date: 1/31/2023  DATE OF EXAM: 1/31/2023 3:05 AM EST PROCEDURE: IR REMOVAL TUNNEL CV CATH WO PORT INDICATIONS: Remove HD cath COMPARISON: No Comparisons Available FLUOROSCOPIC TIME: None PHYSICIAN MONITORED CONSCIOUS SEDATION TIME: None minutes TECHNIQUE: A detailed explanation of the procedure, including the risks, benefits, and alternatives was provided. A preprocedure timeout was performed.  The patient was placed supine in the bed and the pre-existing PermCath and skin was prepped and draped in the usual sterile fashion. The skin was anesthetized with 1% lidocaine. Next, using sharp and blunt dissection the PermCath was freed from the subcutaneous tissues and removed in toto. The patient tolerated the procedure well without immediate complication. FINDINGS: See above     1. Successful PermCath removal.  Electronically Signed: Rafael Khan  1/31/2023 4:22 PM EST  Workstation ID: [id number]    Results for orders placed during the hospital encounter of 01/03/23    Adult Transthoracic Echo Complete W/ Cont if Necessary Per Protocol    Interpretation Summary  •  Left ventricular systolic function is moderately decreased. Left ventricular ejection fraction appears to be 41 - 45%.  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  •  Mild mitral regurgitation.  •  Trace tricuspid regurgitation.  •  There is a moderate sized left pleural effusion.    Discharge Details        Discharge Medications      New Medications      Instructions Start Date   bumetanide 1 MG tablet  Commonly known as: BUMEX   1 mg, Oral, Daily      dexamethasone 6 MG tablet  Commonly known as: DECADRON   6 mg, Oral, Daily With Breakfast   Start Date: February 3, 2023     hydrOXYzine 25 MG tablet  Commonly known as: ATARAX   25 mg, Oral, Nightly PRN      losartan 25 MG tablet  Commonly known as: Cozaar   25 mg, Oral, Daily         Changes to Medications       Instructions Start Date   gabapentin 100 MG capsule  Commonly known as: NEURONTIN  What changed: when to take this   100 mg, Oral, 2 Times Daily      guaiFENesin 600 MG 12 hr tablet  Commonly known as: MUCINEX  What changed:   · when to take this  · reasons to take this   1,200 mg, Oral, 2 Times Daily PRN      insulin lispro 100 UNIT/ML injection  Commonly known as: HumaLOG  What changed:   · how much to take  · how to take this  · when to take this  · additional instructions   3 units tid + Sliding Scale Before Meals: 6 units- 200-250 8 units- 251-300 10 units- 301-350 12 units - 351-400      metoprolol succinate XL 25 MG 24 hr tablet  Commonly known as: TOPROL-XL  What changed:   · medication strength  · how much to take   25 mg, Oral, Every 24 Hours Scheduled   Start Date: February 3, 2023     polyethylene glycol 17 GM/SCOOP powder  Commonly known as: MIRALAX  What changed:   · when to take this  · reasons to take this   17 g, Oral, Daily PRN      sennosides-docusate 8.6-50 MG per tablet  Commonly known as: PERICOLACE  What changed:   · when to take this  · reasons to take this   1 tablet, Oral, Nightly PRN         Continue These Medications      Instructions Start Date   acetaminophen 325 MG tablet  Commonly known as: TYLENOL   650 mg, Oral, Every 6 Hours PRN      apixaban 2.5 MG tablet tablet  Commonly known as: ELIQUIS   2.5 mg, Oral, Every 12 Hours Scheduled      atorvastatin 40 MG tablet  Commonly known as: LIPITOR   40 mg, Oral, Daily      clopidogrel 75 MG tablet  Commonly known as: PLAVIX   75 mg, Oral, Daily      clotrimazole-betamethasone 1-0.05 % cream  Commonly known as: Lotrisone   1 application, Topical, 2 Times Daily      cyclobenzaprine 5 MG tablet  Commonly known as: FLEXERIL   5 mg, Oral, 3 Times Daily PRN      insulin glargine 100 UNIT/ML injection  Commonly known as: Lantus   15 Units, Subcutaneous, Nightly      Lancets misc   1 each, Does not apply, 3 Times Daily      multivitamin with  minerals tablet tablet   1 tablet, Oral, Daily      omeprazole 20 MG capsule  Commonly known as: priLOSEC   20 mg, Oral, 2 Times Daily      ondansetron ODT 4 MG disintegrating tablet  Commonly known as: ZOFRAN-ODT   4 mg, Translingual, Every 8 Hours PRN         Stop These Medications    nystatin 477000 UNIT/GM cream  Commonly known as: MYCOSTATIN          Allergies   Allergen Reactions   • Vancomycin Other (See Comments)     Acute Kidney Injury, requested by ID   • Baclofen Other (See Comments) and Hallucinations     PSYCHOSIS-POA REFUSES ADMINISTRATION OF THIS MED.   • Cephalexin Nausea Only   • Erythromycin Base Nausea Only   • Melatonin Other (See Comments)     Nightmares   • Oxycodone Nausea Only   • Protonix [Pantoprazole] Itching and Rash   • Sulfa Antibiotics Nausea Only     Discharge Disposition:  Home or Self Care    Diet:  Diet Instructions     Diet: Regular, Cardiac, Consistent Carbohydrate, Renal      Discharge Diet:  Regular  Cardiac  Consistent Carbohydrate  Renal             Activity:  Activity Instructions     Activity as Tolerated          CODE STATUS:    Code Status and Medical Interventions:   Ordered at: 01/28/23 1519     Medical Intervention Limits:    NO intubation (DNI)     Code Status (Patient has no pulse and is not breathing):    No CPR (Do Not Attempt to Resuscitate)     Medical Interventions (Patient has pulse or is breathing):    Limited Support     Future Appointments   Date Time Provider Department Center   2/16/2023  3:15 PM Levon Byers PA-C [facility]   4/5/2023  9:30 AM Yari Beal APRN [facility]   4/17/2023  3:00 PM Babatunde Berry PA [facility]   6/2/2023 12:30 PM Anjum Ceballos MD [facility]   10/4/2023  3:45 PM Eloy Marlow MD [facility]     Additional Instructions for the Follow-ups that You Need to Schedule     Ambulatory Referral to Home Health   As directed      Face to Face Visit Date: 2/1/2023    Follow-up provider for  Plan of Care?: I treated the patient in an acute care facility and will not continue treatment after discharge.    Follow-up provider: KOLBY DOHERTY [[id number]]    Reason/Clinical Findings: S/p Hospital stay    Describe mobility limitations that make leaving home difficult: Imapired gait, balance, mobility and endurance.    Nursing/Therapeutic Services Requested: Skilled Nursing Physical Therapy Occupational Therapy    Skilled nursing orders: Telehealth    PT orders: Therapeutic exercise Gait Training Transfer training    Weight Bearing Status: As Tolerated    Occupational orders: Activities of daily living Energy conservation Strengthening    Frequency: 1 Week 1         Discharge Follow-up with PCP   As directed       Currently Documented PCP:    Kolby Doherty MD    PCP Phone Number:    [phone]     Follow Up Details: 1 week         Discharge Follow-up with Specified Provider: [facility] in 1 week   As directed      To: [facility] in 1 week    Follow Up Details: BMP at follow up appointment please             Anna Crystal PA-C  02/02/23    Time Spent on Discharge:  I spent 40 minutes on this discharge activity which included: face-to-face encounter with the patient, reviewing the data in the system, coordination of the care with the nursing staff as well as consultants, documentation, and entering orders.            Electronically signed by Anna Crystal PA-C at 02/02/23 9061

## 2023-02-02 NOTE — DISCHARGE SUMMARY
[facility] Medicine Services  DISCHARGE SUMMARY    Patient Name: Susan Anderson  : 1940  MRN: [id number]    Date of Admission: 2023 11:33 AM  Date of Discharge: 2023  Primary Care Physician: Arleen Doherty MD    Consults     Date and Time Order Name Status Description    2023  3:54 PM Inpatient Nephrology Consult Completed     2023 12:32 AM Inpatient Infectious Diseases Consult Completed     2023 11:33 AM Inpatient Cardiothoracic Surgery Consult Completed     1/3/2023  3:15 PM Inpatient Cardiology Consult Completed     1/3/2023 12:33 PM Inpatient Palliative Care MD Consult Completed         Hospital Course     Presenting Problem:   COVID-19 [U07.1]    Active Hospital Problems    Diagnosis  POA   • **Sepsis ([facility]) [A41.9]  Yes   • COVID-19 [U07.1]  Yes   • Sleep apnea [G47.30]  Yes   • NICM (nonischemic cardiomyopathy) ([facility]) [I42.8]  Yes   • CAD [I25.10]  Yes   • Chronic combined systolic and diastolic heart failure  [I50.42]  Yes   • PVD (peripheral vascular disease) ([facility]) [I73.9]  Yes   • Diabetes mellitus with neuropathy ([facility]) [E11.40]  Yes   • Essential hypertension [I10]  Yes   • Stage 3a chronic kidney disease ([facility]) [N18.31]  Yes      Resolved Hospital Problems   No resolved problems to display.      Hospital Course:  Susan Anderson is a 82 y.o. female with PMH significant for HTN, HLD, CAD, chronic combined systolic/diastolic CHF, paroxysmal atrial fibrillation, CKD III, PAD (s/p L SFA stent), PUD / prior GI bleeding (10/22), insulin-dependent DMII and ELIUD (on 2L NC QHS. Recently admitted to [facility] 1/3-23 after PEA arrest with subsequent ATN necessitating initiation of HD. Admitted to [facility] 23 for sepsis secondary to COVID-19, bradycardia and abnormal movements.      Sepsis, POA   COVID-19 infection  Hypothermia, bradycardia, abnormal movements  - CT head, chest, and A/P without evidence of infection.  Showed known rib fractures, pulmonary edema w/ effusions. UA not c/w infection  - s/p teo hugger for hypothermia. Temps stable now  - Remdesivir deferred due to bradycardia  - Enhanced COVID isolation through 2/17/2022  - Continue Dexamethasone 6mg PO daily to complete total 10 days      CKD3 w/ recent LACEY necessitating HD   Hyperkalemia  - Started on HD last admission - etiology felt to be ATN from cardiac arrest +/- Vancomycin toxicity   - Tunneled HD catheter placed on 1/18/23 - was on T/Th/Sat HD  - Missed HD on 1/28 due to illness  - Has made good renal recovery - HD catheter removed on 1/31/22   - s/p Lokelma for hyperkalemia  - Labs stable. Follow up with nephrology arranged for 2/20/23 @ 11:45am   -  to draw BMP on 2/14/23  - AM renal panel      Acute hypoxic respiratory insufficiency  Acute on chronic HFrEF (41-45%) w/ pleural effusions  Essential hypertension  - Multiple admissions for CHF  - No ARNI / Aldactone or STLT2i due to renal dysfunction  - BB held due to bradycardia - now having some tachycardia  - s/p IV diuresis. Mild hypernatremia today - discussed with nephrology. Likely volume depletion - encouraged PO hydration   - Start Bumex 1mg PO daily at DC. No diuretics today   - Reduced Metoprolol XL to 25mg daily. Start Losartan 25mg PO daily  - [facility] up [facility] in 1 week  - Remains on 1L NC - will DC on home O2      pAfib  CAD  PAD  -s/p LT SFA stenting 9/2022  - Watchman was planned due to GI bleeding - delayed due to PEA arrest. Plans for future placement unclear  - Continue Plavix, Eliquis, Metoprolol and statin     DM type 2, a1c 8.1%, w/ long term use of insulin  Steroid-induced hyperglycemia  - Resume home insulin regimen at DC      Hx GIB  Chronic anemia  - Hgb stable  - Continue home Omeprazole     Anxiety/insomnia  - Good response to Hydroxyzine last night- will send home with PRN Rx for sleep     Day of Discharge     HPI:   Sitting up in chair, daughter at bedside.  Reports she slept well last night. Denies chest pain or dyspnea. No abdominal pain, nausea or vomiting. Feels well and wants to go home today. Daughter is comfortable with plan - would like spirometer for home. Patient expresses she still wants Watchman placed and asks if she'll have to wait 3 months. We discussed     Review of Systems  Gen- No fevers, chills  CV- No chest pain, palpitations  Resp- No cough, dyspnea  GI- No N/V/D, abd pain    Vital Signs:   Temp:  [97.2 °F (36.2 °C)-98.2 °F (36.8 °C)] 97.2 °F (36.2 °C)  Heart Rate:  [77-95] 77  Resp:  [16-18] 18  BP: (152-171)/(86-99) 157/87  Flow (L/min):  [1] 1    Physical Exam:  Constitutional: No acute distress, awake, alert and conversant. Sitting in chair   HENT: NCAT, mucous membranes moist  Respiratory: Mild bibasilar rales without wheezes or rhonchi, respiratory effort normal, O2 sat 97% 1L NC  Cardiovascular: RRR, no murmurs, rubs, or gallops  Gastrointestinal: Positive bowel sounds, soft, nontender, nondistended  Musculoskeletal: No bilateral ankle edema  Psychiatric: Appropriate affect, cooperative  Neurologic: Oriented x 3, moves all extremities spontaneously without focal deficits, speech clear    Pertinent  and/or Most Recent Results     LAB RESULTS:      Lab 02/02/23  0429 02/01/23  0454 01/30/23  0514 01/29/23  0804 01/28/23  1218 01/28/23  1203   WBC 6.89 7.52 8.04 6.31  --  5.76   HEMOGLOBIN 10.3* 9.8* 9.2* 10.2*  --  9.9*   HEMOGLOBIN, POC  --   --   --   --  11.6*  --    HEMATOCRIT 34.5 32.8* 31.6* 35.3  --  34.7   HEMATOCRIT POC  --   --   --   --  34*  --    PLATELETS 162 149 133* 118*  --  91*   NEUTROS ABS  --  6.52 7.03* 5.62  --  4.34   IMMATURE GRANS (ABS)  --  0.17* 0.10* 0.06*  --  0.05   LYMPHS ABS  --  0.45* 0.47* 0.47*  --  0.73   MONOS ABS  --  0.37 0.44 0.15  --  0.59   EOS ABS  --  0.00 0.00 0.00  --  0.02   MCV 95.3 95.3 97.5* 97.0  --  97.7*   SED RATE  --   --   --   --   --  19   CRP  --   --   --   --   --  1.80*   LACTATE   --   --   --   --   --  1.2   LDH  --   --   --   --   --  486*         Lab 02/02/23  0955 02/02/23  0429 02/01/23  0928 01/31/23  0625 01/30/23  0514 01/28/23  1218 01/28/23  1203   SODIUM 146* 147* 141 140 136   < > 138   POTASSIUM 3.6 3.5 4.2 4.9 5.4*   < > 5.2   CHLORIDE 101 102 99 101 100   < > 101   CO2 37.0* 36.0* 32.0* 31.0* 28.0   < > 30.0*   ANION GAP 8.0 9.0 10.0 8.0 8.0   < > 7.0   BUN 48* 49* 47* 39* 38*   < > 24*   CREATININE 1.08* 1.22* 1.59* 1.79* 1.96*   < > 1.91*   EGFR 51.1* 44.1* 32.1* 27.9* 25.0*   < > 25.8*   GLUCOSE 62* 112* 273* 303* 400*   < > 112*   CALCIUM 8.4* 8.0* 8.2* 8.3* 8.4*   < > 8.3*   MAGNESIUM  --   --   --   --   --   --  1.9   PHOSPHORUS  --  3.5  --   --   --   --   --    TSH  --   --   --   --  0.480  --   --     < > = values in this interval not displayed.         Lab 02/02/23  0429 01/29/23  0804 01/28/23  1203   TOTAL PROTEIN  --  5.5* 5.8*   ALBUMIN 3.0* 3.3* 3.2*   GLOBULIN  --  2.2 2.6   ALT (SGPT)  --  32 30   AST (SGOT)  --  41* 45*   BILIRUBIN  --  0.9 0.7   ALK PHOS  --  244* 247*         Lab 01/28/23  1203   TROPONIN T 0.221*     Brief Urine Lab Results  (Last result in the past 365 days)      Color   Clarity   Blood   Leuk Est   Nitrite   Protein   CREAT   Urine HCG        01/28/23 1203 Dark Yellow   Turbid   Small (1+)   Negative   Negative   >=300 mg/dL (3+)               Microbiology Results (last 10 days)     Procedure Component Value - Date/Time    Blood Culture - Blood, Arm, Left [[id number]]  (Normal) Collected: 01/28/23 1328    Lab Status: Preliminary result Specimen: Blood from Arm, Left Updated: 02/01/23 1345     Blood Culture No growth at 4 days    Blood Culture - Blood, Hand, Right [[id number]]  (Normal) Collected: 01/28/23 1321    Lab Status: Preliminary result Specimen: Blood from Hand, Right Updated: 02/01/23 1345     Blood Culture No growth at 4 days    Narrative:      Aerobic bottle only      COVID PRE-OP / PRE-PROCEDURE SCREENING ORDER (NO  ISOLATION) - Swab, Nasopharynx [[id number]]  (Abnormal) Collected: 01/28/23 1204    Lab Status: Final result Specimen: Swab from Nasopharynx Updated: 01/28/23 1307    Narrative:      The following orders were created for panel order COVID PRE-OP / PRE-PROCEDURE SCREENING ORDER (NO ISOLATION) - Swab, Nasopharynx.  Procedure                               Abnormality         Status                     ---------                               -----------         ------                     COVID-19 and FLU A/B PCR...[[id number]]  Abnormal            Final result                 Please view results for these tests on the individual orders.    COVID-19 and FLU A/B PCR - Swab, Nasopharynx [[id number]]  (Abnormal) Collected: 01/28/23 1204    Lab Status: Final result Specimen: Swab from Nasopharynx Updated: 01/28/23 1307     COVID19 Detected     Influenza A PCR Not Detected     Influenza B PCR Not Detected    Narrative:      Fact sheet for providers: https://www.fda.gov/media/136112/download    Fact sheet for patients: https://www.fda.gov/media/652208/download    Test performed by PCR.  Influenza A and Influenza B negative results should be considered presumptive in samples that have a positive SARS-CoV-2 result.    Competitive inhibition studies showed that SARS-CoV-2 virus, when present at concentrations above 3.6E+04 copies/mL, can inhibit the detection and amplification of influenza A and influenza B virus RNA if present at or below 1.8E+02 copies/mL or 4.9E+02 copies/mL, respectively, and may lead to false negative influenza virus results. If co-infection with influenza A or influenza B virus is suspected in samples with a positive SARS-CoV-2 result, the sample should be re-tested with another FDA cleared, approved, or authorized influenza test, if influenza virus detection would change clinical management.        CT Abdomen Pelvis Without Contrast    Result Date: 1/28/2023  CT CHEST WO CONTRAST DIAGNOSTIC, CT ABDOMEN PELVIS  WO CONTRAST Date of Exam: 1/28/2023 12:31 PM EST Indication: altered. Comparison: CT chest 6/1/2021, CT abdomen and pelvis 10/6/2022 Technique: Axial CT images were obtained of the chest, abdomen, and pelvis without contrast administration.  Reconstructed coronal and sagittal images were also obtained. Automated exposure control and iterative construction methods were used. Findings: Chest: There is hypodense blood pool compatible with anemia. There is moderate atherosclerosis of the thoracic aorta and aortic arch branch vessels. There are mitral annular calcifications and scattered coronary artery calcifications. No pericardial effusion. There is multichamber cardiac enlargement. A tunneled right IJ dialysis catheter terminates at the cavoatrial junction. No bulky or pathologic-appearing mediastinal or hilar lymph nodes. There are couple prominent calcified mediastinal and right hilar lymph nodes compatible sequelae of healed granulomatous disease. Mildly patulous thoracic esophagus. Mild diffuse subcutaneous fat stranding of the lower chest wall soft tissues compatible with anasarca. The trachea and mainstem bronchi are patent. Expiratory configuration of the trachea and mainstem bronchi. There is bronchial wall thickening of the central and lower lobe airways. There is intralobular septal thickening and perihilar and bibasilar centrilobular groundglass, with small to moderate bilateral pleural effusions and associated bibasilar consolidative densities compatible with adjacent passive atelectasis. No pneumothorax. No lung mass. There are acute or subacute appearing minimally displaced fractures of the posterolateral right fifth and sixth ribs (series 4 image 49). Abdomen and pelvis: Grossly unremarkable appearance of the liver. The gallbladder is surgically absent. Normal appearance of the bile ducts. Unremarkable appearance of the spleen, pancreas, adrenal glands, and kidneys. Similar mild nonspecific perinephric  fat stranding. Normal appearance of the ureters and bladder. The uterus is surgically absent. There is a small sliding hiatal hernia. Mild colonic stool burden. No bowel obstruction. No definite inflammatory change of the GI tract. Small scattered nonorganized free fluid in the right paracolic gutter. There is atherosclerosis of the abdominal aorta without evidence of aneurysm. No lymphadenopathy. Diffuse subcutaneous fat stranding of the body wall soft tissues compatible with anasarca. No acute or suspicious bony findings in the abdomen and pelvis.     Impression: Small-to-moderate bilateral pleural effusions and interstitial and parenchymal densities compatible with pulmonary edema. There is evidence of multichamber cardiac enlargement and right-sided dialysis catheter. There is diffuse anasarca and small nonorganized free fluid in the abdomen compatible with fluid overload state. Chronic for hypoxia as etiology of altered mental status. Acute or subacute appearing minimally displaced fractures of the posterolateral right fifth and sixth ribs. Correlate for any recent fall/trauma. No pneumothorax. Electronically Signed: Kings Baez  1/28/2023 1:23 PM EST  Workstation ID: [id number]    CT Head Without Contrast    Result Date: 1/28/2023  CT HEAD WO CONTRAST Date of Exam: 1/28/2023 12:31 PM EST Indication: altered mental status. Comparison: Head CT 10/6/2022 Technique: Axial CT images were obtained of the head without contrast administration.  Reconstructed coronal and sagittal images were also obtained. Automated exposure control and iterative construction methods were used. Findings: Exam is limited owing to motion degradation required multiple rescans. Allowing for this limitation, no evidence of acute intracranial hemorrhage. No acute large territory infarct. There are scattered subcortical and periventricular white matter hypodensities which are nonspecific and can be seen in the setting of chronic small vessel  ischemic change. No extra-axial collections. No midline shift or herniation. Normal size and configuration of the ventricles. Unremarkable appearance of the orbits. No acute osseous findings. The paranasal sinuses and mastoid air cells are clear.     Impression: Limited exam owing to extensive motion artifact. Within this limitation, no acute intracranial findings. Chronic and senescent changes as detailed above. Electronically Signed: Kings Baez  1/28/2023 1:08 PM EST  Workstation ID: [id number]    CT Chest Without Contrast Diagnostic    Result Date: 1/28/2023  CT CHEST WO CONTRAST DIAGNOSTIC, CT ABDOMEN PELVIS WO CONTRAST Date of Exam: 1/28/2023 12:31 PM EST Indication: altered. Comparison: CT chest 6/1/2021, CT abdomen and pelvis 10/6/2022 Technique: Axial CT images were obtained of the chest, abdomen, and pelvis without contrast administration.  Reconstructed coronal and sagittal images were also obtained. Automated exposure control and iterative construction methods were used. Findings: Chest: There is hypodense blood pool compatible with anemia. There is moderate atherosclerosis of the thoracic aorta and aortic arch branch vessels. There are mitral annular calcifications and scattered coronary artery calcifications. No pericardial effusion. There is multichamber cardiac enlargement. A tunneled right IJ dialysis catheter terminates at the cavoatrial junction. No bulky or pathologic-appearing mediastinal or hilar lymph nodes. There are couple prominent calcified mediastinal and right hilar lymph nodes compatible sequelae of healed granulomatous disease. Mildly patulous thoracic esophagus. Mild diffuse subcutaneous fat stranding of the lower chest wall soft tissues compatible with anasarca. The trachea and mainstem bronchi are patent. Expiratory configuration of the trachea and mainstem bronchi. There is bronchial wall thickening of the central and lower lobe airways. There is intralobular septal thickening and  perihilar and bibasilar centrilobular groundglass, with small to moderate bilateral pleural effusions and associated bibasilar consolidative densities compatible with adjacent passive atelectasis. No pneumothorax. No lung mass. There are acute or subacute appearing minimally displaced fractures of the posterolateral right fifth and sixth ribs (series 4 image 49). Abdomen and pelvis: Grossly unremarkable appearance of the liver. The gallbladder is surgically absent. Normal appearance of the bile ducts. Unremarkable appearance of the spleen, pancreas, adrenal glands, and kidneys. Similar mild nonspecific perinephric fat stranding. Normal appearance of the ureters and bladder. The uterus is surgically absent. There is a small sliding hiatal hernia. Mild colonic stool burden. No bowel obstruction. No definite inflammatory change of the GI tract. Small scattered nonorganized free fluid in the right paracolic gutter. There is atherosclerosis of the abdominal aorta without evidence of aneurysm. No lymphadenopathy. Diffuse subcutaneous fat stranding of the body wall soft tissues compatible with anasarca. No acute or suspicious bony findings in the abdomen and pelvis.     Impression: Small-to-moderate bilateral pleural effusions and interstitial and parenchymal densities compatible with pulmonary edema. There is evidence of multichamber cardiac enlargement and right-sided dialysis catheter. There is diffuse anasarca and small nonorganized free fluid in the abdomen compatible with fluid overload state. Chronic for hypoxia as etiology of altered mental status. Acute or subacute appearing minimally displaced fractures of the posterolateral right fifth and sixth ribs. Correlate for any recent fall/trauma. No pneumothorax. Electronically Signed: Kings Baez  1/28/2023 1:23 PM EST  Workstation ID: [id number]    XR Chest 1 View    Result Date: 1/28/2023  XR CHEST 1 VW Date of Exam: 1/28/2023 11:42 AM EST Indication: Weak/Dizzy/AMS  triage protocol. Comparison: 1/18/2023, 1/28/2023 Findings: There are no airspace consolidations. Moderate bilateral pleural effusions present with overlying atelectasis. No pneumothorax. The pulmonary vasculature appears indistinct. The heart appears enlarged. Right internal jugular PermCath with the tip the cavoatrial junction. No acute osseous abnormality identified.     Impression: Moderate pulmonary edema pattern with moderate bilateral pleural effusions with overlying atelectasis. Superimposed pneumonia cannot be excluded. No significant changes compared to the previous study. Electronically Signed: Karen Rey  1/28/2023 1:07 PM EST  Workstation ID: [id number]    IR Removal Tunnel CV Cath Without Port    Result Date: 1/31/2023  DATE OF EXAM: 1/31/2023 3:05 AM EST PROCEDURE: IR REMOVAL TUNNEL CV CATH WO PORT INDICATIONS: Remove HD cath COMPARISON: No Comparisons Available FLUOROSCOPIC TIME: None PHYSICIAN MONITORED CONSCIOUS SEDATION TIME: None minutes TECHNIQUE: A detailed explanation of the procedure, including the risks, benefits, and alternatives was provided. A preprocedure timeout was performed.  The patient was placed supine in the bed and the pre-existing PermCath and skin was prepped and draped in the usual sterile fashion. The skin was anesthetized with 1% lidocaine. Next, using sharp and blunt dissection the PermCath was freed from the subcutaneous tissues and removed in toto. The patient tolerated the procedure well without immediate complication. FINDINGS: See above     1. Successful PermCath removal.  Electronically Signed: Rafael Khan  1/31/2023 4:22 PM EST  Workstation ID: [id number]    Results for orders placed during the hospital encounter of 01/03/23    Adult Transthoracic Echo Complete W/ Cont if Necessary Per Protocol    Interpretation Summary  •  Left ventricular systolic function is moderately decreased. Left ventricular ejection fraction appears to be 41 - 45%.  •  Left ventricular wall  thickness is consistent with mild septal asymmetric hypertrophy.  •  Mild mitral regurgitation.  •  Trace tricuspid regurgitation.  •  There is a moderate sized left pleural effusion.    Discharge Details        Discharge Medications      New Medications      Instructions Start Date   bumetanide 1 MG tablet  Commonly known as: BUMEX   1 mg, Oral, Daily      dexamethasone 6 MG tablet  Commonly known as: DECADRON   6 mg, Oral, Daily With Breakfast   Start Date: February 3, 2023     hydrOXYzine 25 MG tablet  Commonly known as: ATARAX   25 mg, Oral, Nightly PRN      losartan 25 MG tablet  Commonly known as: Cozaar   25 mg, Oral, Daily         Changes to Medications      Instructions Start Date   gabapentin 100 MG capsule  Commonly known as: NEURONTIN  What changed: when to take this   100 mg, Oral, 2 Times Daily      guaiFENesin 600 MG 12 hr tablet  Commonly known as: MUCINEX  What changed:   when to take this  reasons to take this   1,200 mg, Oral, 2 Times Daily PRN      insulin lispro 100 UNIT/ML injection  Commonly known as: HumaLOG  What changed:   how much to take  how to take this  when to take this  additional instructions   3 units tid + Sliding Scale Before Meals: 6 units- 200-250 8 units- 251-300 10 units- 301-350 12 units - 351-400      metoprolol succinate XL 25 MG 24 hr tablet  Commonly known as: TOPROL-XL  What changed:   medication strength  how much to take   25 mg, Oral, Every 24 Hours Scheduled   Start Date: February 3, 2023     polyethylene glycol 17 GM/SCOOP powder  Commonly known as: MIRALAX  What changed:   when to take this  reasons to take this   17 g, Oral, Daily PRN      sennosides-docusate 8.6-50 MG per tablet  Commonly known as: PERICOLACE  What changed:   when to take this  reasons to take this   1 tablet, Oral, Nightly PRN         Continue These Medications      Instructions Start Date   acetaminophen 325 MG tablet  Commonly known as: TYLENOL   650 mg, Oral, Every 6 Hours PRN      apixaban  2.5 MG tablet tablet  Commonly known as: ELIQUIS   2.5 mg, Oral, Every 12 Hours Scheduled      atorvastatin 40 MG tablet  Commonly known as: LIPITOR   40 mg, Oral, Daily      clopidogrel 75 MG tablet  Commonly known as: PLAVIX   75 mg, Oral, Daily      clotrimazole-betamethasone 1-0.05 % cream  Commonly known as: Lotrisone   1 application, Topical, 2 Times Daily      cyclobenzaprine 5 MG tablet  Commonly known as: FLEXERIL   5 mg, Oral, 3 Times Daily PRN      insulin glargine 100 UNIT/ML injection  Commonly known as: Lantus   15 Units, Subcutaneous, Nightly      Lancets misc   1 each, Does not apply, 3 Times Daily      multivitamin with minerals tablet tablet   1 tablet, Oral, Daily      omeprazole 20 MG capsule  Commonly known as: priLOSEC   20 mg, Oral, 2 Times Daily      ondansetron ODT 4 MG disintegrating tablet  Commonly known as: ZOFRAN-ODT   4 mg, Translingual, Every 8 Hours PRN         Stop These Medications    nystatin [id number] UNIT/GM cream  Commonly known as: MYCOSTATIN          Allergies   Allergen Reactions   • Vancomycin Other (See Comments)     Acute Kidney Injury, requested by ID   • Baclofen Other (See Comments) and Hallucinations     PSYCHOSIS-POA REFUSES ADMINISTRATION OF THIS MED.   • Cephalexin Nausea Only   • Erythromycin Base Nausea Only   • Melatonin Other (See Comments)     Nightmares   • Oxycodone Nausea Only   • Protonix [Pantoprazole] Itching and Rash   • Sulfa Antibiotics Nausea Only     Discharge Disposition:  Home or Self Care    Diet:  Diet Instructions     Diet: Regular, Cardiac, Consistent Carbohydrate, Renal      Discharge Diet:  Regular  Cardiac  Consistent Carbohydrate  Renal             Activity:  Activity Instructions     Activity as Tolerated          CODE STATUS:    Code Status and Medical Interventions:   Ordered at: 01/28/23 1519     Medical Intervention Limits:    NO intubation (DNI)     Code Status (Patient has no pulse and is not breathing):    No CPR (Do Not Attempt to  Resuscitate)     Medical Interventions (Patient has pulse or is breathing):    Limited Support     Future Appointments   Date Time Provider Department Center   2/16/2023  3:15 PM Levon Byers PA-C [facility]   4/5/2023  9:30 AM Yari Beal APRN [facility]   4/17/2023  3:00 PM Babatunde Berry PA [facility]   6/2/2023 12:30 PM Anjum Ceballos MD [facility]   10/4/2023  3:45 PM Eloy Marlow MD [facility]     Additional Instructions for the Follow-ups that You Need to Schedule     Ambulatory Referral to Home Health   As directed      Face to Face Visit Date: 2/1/2023    Follow-up provider for Plan of Care?: I treated the patient in an acute care facility and will not continue treatment after discharge.    Follow-up provider: ARLEEN DOHERTY [[id number]]    Reason/Clinical Findings: S/p Hospital stay    Describe mobility limitations that make leaving home difficult: Imapired gait, balance, mobility and endurance.    Nursing/Therapeutic Services Requested: Skilled Nursing Physical Therapy Occupational Therapy    Skilled nursing orders: Telehealth    PT orders: Therapeutic exercise Gait Training Transfer training    Weight Bearing Status: As Tolerated    Occupational orders: Activities of daily living Energy conservation Strengthening    Frequency: 1 Week 1         Discharge Follow-up with PCP   As directed       Currently Documented PCP:    Arleen Doherty MD    PCP Phone Number:    [phone]     Follow Up Details: 1 week         Discharge Follow-up with Specified Provider: [facility] in 1 week   As directed      To: [facility] in 1 week    Follow Up Details: BMP at follow up appointment please             Anna Crystal PA-C  02/02/23    Time Spent on Discharge:  I spent 40 minutes on this discharge activity which included: face-to-face encounter with the patient, reviewing the data in the system, coordination of the care with the nursing staff as  well as consultants, documentation, and entering orders.

## 2023-02-02 NOTE — PLAN OF CARE
Goal Outcome Evaluation:  Plan of Care Reviewed With: patient        Progress: improving  Outcome Evaluation: VSS. Pt up to chair with PT/OT. She also pivots to bedside commode. Will continue to monitor.

## 2023-02-02 NOTE — PROGRESS NOTES
" LOS: 5 days   Patient Care Team:  Arleen Doherty MD as PCP - General (Internal Medicine)  Flory Sauer APRN (Nurse Practitioner)  Janeth Lam MD as Consulting Physician (Endocrinology)  Anjum Ceballos MD as Consulting Physician (Cardiology)    Chief Complaint: LACEY on CKD stage III on HD.     Subjective   Patient is working with PT. Back on supplemental o2. As O2 dropped on RA.   Subjective    History taken from: RN    Objective     Vital Sign Min/Max for last 24 hours  Temp  Min: 97.2 °F (36.2 °C)  Max: 98.2 °F (36.8 °C)   BP  Min: 152/86  Max: 171/86   Pulse  Min: 77  Max: 95   Resp  Min: 16  Max: 18   SpO2  Min: 85 %  Max: 98 %   Flow (L/min)  Min: 1  Max: 1   No data recorded     Flowsheet Rows    Flowsheet Row First Filed Value   Admission Height 162.6 cm (64\") Documented at 01/28/2023 1142   Admission Weight 82.6 kg (182 lb) Documented at 01/28/2023 1142          I/O this shift:  In: 360 [P.O.:360]  Out: -   I/O last 3 completed shifts:  In: 480 [P.O.:480]  Out: 1375 [Urine:1375]    Objective:  General Appearance:  Comfortable.    Vital signs: (most recent): Blood pressure 157/87, pulse 77, temperature 97.2 °F (36.2 °C), temperature source Oral, resp. rate 18, height 162.6 cm (64\"), weight 82.6 kg (182 lb), SpO2 95 %.    Output: Producing urine.    HEENT: Normal HEENT exam.    Lungs:  Normal effort.    Heart: Normal rate.    Extremities: There is dependent edema.    Neurological: (Sleeping. At baseline AAX3).    Skin:  Warm.              Results Review:     I reviewed the patient's new clinical results.    WBC WBC   Date Value Ref Range Status   02/02/2023 6.89 3.40 - 10.80 10*3/mm3 Final   02/01/2023 7.52 3.40 - 10.80 10*3/mm3 Final      HGB Hemoglobin   Date Value Ref Range Status   02/02/2023 10.3 (L) 12.0 - 15.9 g/dL Final   02/01/2023 9.8 (L) 12.0 - 15.9 g/dL Final      HCT Hematocrit   Date Value Ref Range Status   02/02/2023 34.5 34.0 - 46.6 % Final   02/01/2023 32.8 (L) 34.0 - 46.6 % " Final      Platlets No results found for: LABPLAT   MCV MCV   Date Value Ref Range Status   02/02/2023 95.3 79.0 - 97.0 fL Final   02/01/2023 95.3 79.0 - 97.0 fL Final          Sodium Sodium   Date Value Ref Range Status   02/02/2023 146 (H) 136 - 145 mmol/L Final   02/02/2023 147 (H) 136 - 145 mmol/L Final   02/01/2023 141 136 - 145 mmol/L Final   01/31/2023 140 136 - 145 mmol/L Final      Potassium Potassium   Date Value Ref Range Status   02/02/2023 3.6 3.5 - 5.2 mmol/L Final   02/02/2023 3.5 3.5 - 5.2 mmol/L Final   02/01/2023 4.2 3.5 - 5.2 mmol/L Final   01/31/2023 4.9 3.5 - 5.2 mmol/L Final     Comment:     Slight hemolysis detected by analyzer. Results may be affected.      Chloride Chloride   Date Value Ref Range Status   02/02/2023 101 98 - 107 mmol/L Final   02/02/2023 102 98 - 107 mmol/L Final   02/01/2023 99 98 - 107 mmol/L Final   01/31/2023 101 98 - 107 mmol/L Final      CO2 CO2   Date Value Ref Range Status   02/02/2023 37.0 (H) 22.0 - 29.0 mmol/L Final   02/02/2023 36.0 (H) 22.0 - 29.0 mmol/L Final   02/01/2023 32.0 (H) 22.0 - 29.0 mmol/L Final   01/31/2023 31.0 (H) 22.0 - 29.0 mmol/L Final      BUN BUN   Date Value Ref Range Status   02/02/2023 48 (H) 8 - 23 mg/dL Final   02/02/2023 49 (H) 8 - 23 mg/dL Final   02/01/2023 47 (H) 8 - 23 mg/dL Final   01/31/2023 39 (H) 8 - 23 mg/dL Final      Creatinine Creatinine   Date Value Ref Range Status   02/02/2023 1.08 (H) 0.57 - 1.00 mg/dL Final   02/02/2023 1.22 (H) 0.57 - 1.00 mg/dL Final   02/01/2023 1.59 (H) 0.57 - 1.00 mg/dL Final   01/31/2023 1.79 (H) 0.57 - 1.00 mg/dL Final      Calcium Calcium   Date Value Ref Range Status   02/02/2023 8.4 (L) 8.6 - 10.5 mg/dL Final   02/02/2023 8.0 (L) 8.6 - 10.5 mg/dL Final   02/01/2023 8.2 (L) 8.6 - 10.5 mg/dL Final   01/31/2023 8.3 (L) 8.6 - 10.5 mg/dL Final      PO4 No results found for: CAPO4   Albumin Albumin   Date Value Ref Range Status   02/02/2023 3.0 (L) 3.5 - 5.2 g/dL Final      Magnesium No results found  for: MG   Uric Acid No results found for: URICACID     Medication Review: yes    Assessment & Plan       Sepsis (HCC)    PVD (peripheral vascular disease) (HCC)    Diabetes mellitus with neuropathy (HCC)    Essential hypertension    Stage 3a chronic kidney disease (HCC)    NICM (nonischemic cardiomyopathy) (HCC)    CAD    Chronic combined systolic and diastolic heart failure     Sleep apnea    COVID-19      Assessment & Plan     #  LACEY on CKD stage 3 -  started on dialysis during recent admission on TTS schedule. Etiology ischemic ATN following PEA arrest. She required few HD treatments with spontaneous renal recovery. Dialysis on hold during this admission. TDC removed on 1/31/22.       # Hyperkalemia:  K 6.4>6.0>5.4 on this admission. Ok lokelma    # Anemia:   - Hgb 10.2>9.2. Hold IV iron in the setting of recent infection.      # COVID PNA:   - Started on dexamethasone.      # Altered mental status: waxing and waning. Significant improvement on this admission.    Recs  Renal function stable.TDC removed.  Ok to continue bumex 1 mg daily on discharge. If started on losartan 25 mg ( For proteinuria and BP control) Will get labs in 2weeks f/u in 3. Will see her in office on Feb 21th @ 11:45. Encourage po intake as she has mild hypernatremia.      Thank you for the consult    Cecil Neff MD  02/02/23  12:38 EST

## 2023-02-02 NOTE — PLAN OF CARE
Goal Outcome Evaluation:  Pts renal function has improved and she is off of HD.   She qualified for home oxygen use and will be discharged with her dtr today

## 2023-02-03 ENCOUNTER — TRANSITIONAL CARE MANAGEMENT TELEPHONE ENCOUNTER (OUTPATIENT)
Dept: CALL CENTER | Facility: HOSPITAL | Age: 83
End: 2023-02-03
Payer: MEDICARE

## 2023-02-03 NOTE — OUTREACH NOTE
Call Center TCM Note    Flowsheet Row Responses   [facility] patient discharged from? [place]   Does the patient have one of the following disease processes/diagnoses(primary or secondary)? Sepsis   TCM attempt successful? Yes  [Katie-daughter ]   Call start time 1205   Call end time 1208   Discharge diagnosis Sepsis R/T COVID   Person spoke with today (if not patient) and relationship Katie-daughter    Meds reviewed with patient/caregiver? Yes   Is the patient having any side effects they believe may be caused by any medication additions or changes? No   Does the patient have all medications related to this admission filled (includes all antibiotics, inhalers, nebulizers,steroids,etc.) Yes   Is the patient taking all medications as directed (includes completed medication regime)? Yes   Comments Hospital d/c f/u appt is on 2/16/23 at 3:15 PM    Does the patient have an appointment with their PCP within 7 days of discharge? Yes   What is the Home health agency?  [facility]  for PT, OT, and SN   Has home health visited the patient within 72 hours of discharge? No   What DME was ordered? 02- [facility]    Has all DME been delivered? Yes   Psychosocial issues? No   Did the patient receive a copy of their discharge instructions? Yes   Nursing interventions Reviewed instructions with patient   What is the patient's perception of their health status since discharge? Same   Is patient/caregiver able to teach back steps to recovery at home? Rest and regain strength, Set small, achievable goals for return to baseline health   Is the patient/caregiver able to teach back signs and symptoms of worsening condition: Fever, Hyperthermia, Altered mental status(confusion/coma), Shortness of breath/rapid respiratory rate   TCM call completed? Yes   Call end time 1208   Would this patient benefit from a Referral to [facility] Social Work? No   Is the patient interested in additional calls from an ambulatory ?  NOTE:   applies to high risk patients requiring additional follow-up. Sharon Zuluaga RN    2/3/2023, 12:08 EST

## 2023-02-06 NOTE — PROGRESS NOTES
"Enter Query Response Below      Query Response:     No. Readmission she was covid positive and that was the reason she was readmitted after her prolonged hospital stay from the PEA         If applicable, please update the problem list.         Patient: Susan Anderson        : 1940  Account: [id number]           Admit Date: 1/3/2023        How to Respond to this query:       a. Click New Note     b. Answer query within the yellow box.                c. Update the Problem List, if applicable.      If you have any questions about this query contact me at: [email or web address]        82 year old female admitted 1/3 for PEA cardiac arrest during EGD procedure. Troponin was noted to be 0.263 - 0.246. The patient was diagnosed with NSTEMI, further specified as \"NSTEMI type II due to inc demand from Covid\" per previous query response.  Per the record, there was no Covid-19 screen done during the admission and no mention of Covid-19 infection.     After study, was  Covid-19  clinically supported during this admission?     Yes, please include additional clinical indicators:____________   No   Other- specify________   Unable to determine       By submitting this query, we are merely seeking further clarification of documentation to accurately reflect all conditions that you are monitoring, evaluating, treating or that extend the hospitalization or utilize additional resources of care. Please utilize your independent clinical judgment when addressing the question(s) above.     This query and your response, once completed, will be entered into the legal medical record.    Sincerely,  Brinda Sanchez RN CCDS  Clinical Documentation Integrity Program     "

## 2023-02-10 DIAGNOSIS — Z79.4 TYPE 2 DIABETES MELLITUS WITH HYPERGLYCEMIA, WITH LONG-TERM CURRENT USE OF INSULIN: ICD-10-CM

## 2023-02-10 DIAGNOSIS — E11.65 TYPE 2 DIABETES MELLITUS WITH HYPERGLYCEMIA, WITH LONG-TERM CURRENT USE OF INSULIN: ICD-10-CM

## 2023-02-10 RX ORDER — INSULIN GLARGINE 100 [IU]/ML
INJECTION, SOLUTION SUBCUTANEOUS
Qty: 10 ML | Refills: 5 | OUTPATIENT
Start: 2023-02-10

## 2023-02-13 ENCOUNTER — TELEPHONE (OUTPATIENT)
Dept: INTERNAL MEDICINE | Facility: CLINIC | Age: 83
End: 2023-02-13
Payer: MEDICARE

## 2023-02-13 DIAGNOSIS — Z79.4 TYPE 2 DIABETES MELLITUS WITH HYPERGLYCEMIA, WITH LONG-TERM CURRENT USE OF INSULIN: ICD-10-CM

## 2023-02-13 DIAGNOSIS — E11.65 TYPE 2 DIABETES MELLITUS WITH HYPERGLYCEMIA, WITH LONG-TERM CURRENT USE OF INSULIN: ICD-10-CM

## 2023-02-13 RX ORDER — INSULIN GLARGINE 100 [IU]/ML
15 INJECTION, SOLUTION SUBCUTANEOUS NIGHTLY
Start: 2023-02-13

## 2023-02-14 ENCOUNTER — READMISSION MANAGEMENT (OUTPATIENT)
Dept: CALL CENTER | Facility: HOSPITAL | Age: 83
End: 2023-02-14
Payer: MEDICARE

## 2023-02-14 DIAGNOSIS — E11.65 TYPE 2 DIABETES MELLITUS WITH HYPERGLYCEMIA, WITH LONG-TERM CURRENT USE OF INSULIN: ICD-10-CM

## 2023-02-14 DIAGNOSIS — Z79.4 TYPE 2 DIABETES MELLITUS WITH HYPERGLYCEMIA, WITH LONG-TERM CURRENT USE OF INSULIN: ICD-10-CM

## 2023-02-14 RX ORDER — INSULIN GLARGINE 100 [IU]/ML
INJECTION, SOLUTION SUBCUTANEOUS
Qty: 10 ML | Refills: 5 | OUTPATIENT
Start: 2023-02-14

## 2023-02-14 NOTE — TELEPHONE ENCOUNTER
Pharmacy Name: [facility] - [place], [place] - [facility] [address] - [phone]  - [phone]      Pharmacy representative name: CARA    Pharmacy representative phone number: [phone]    What medication are you calling in regards to:    insulin glargine (Lantus) 100 UNIT/ML injection     What question does the pharmacy have: PATIENT IS OUT OF THIS COMPLETELY AND IN NEED OF A REFILL    Who is the provider that prescribed the medication: KOLBY HO MD

## 2023-02-14 NOTE — OUTREACH NOTE
Sepsis Week 2 Survey    Flowsheet Row Responses   [facility] patient discharged from? [place]   Does the patient have one of the following disease processes/diagnoses(primary or secondary)? Sepsis   Week 2 attempt successful? Yes   Call start time 1608   Call end time 1617   Discharge diagnosis Sepsis R/T COVID   Person spoke with today (if not patient) and relationship patient   Meds reviewed with patient/caregiver? Yes   Does the patient have all medications related to this admission filled (includes all antibiotics, inhalers, nebulizers,steroids,etc.) Yes   Is the patient taking all medications as directed (includes completed medication regime)? Yes   Does the patient have a primary care provider?  Yes   Comments regarding PCP Hospital Follow Up with Levon Byers PA-C Thursday Feb 16, 2023 3:15 PM   Does the patient have an appointment with their PCP within 7 days of discharge? Greater than 7 days   Nursing Interventions Verified appointment date/time/provider   Has the patient kept scheduled appointments due by today? N/A   Comments Nephrology appat 2/20/23  1145am   What is the Home health agency?  [facility]  for PT, OT, and SN   What DME was ordered? 02- [facility]    Psychosocial issues? No   Did the patient receive a copy of their discharge instructions? Yes   Nursing interventions Reviewed instructions with patient   What is the patient's perception of their health status since discharge? Improving   Nursing interventions Nurse provided reassurance to patient, Nurse provided patient education   Is patient/caregiver able to teach back steps to recovery at home? Set small, achievable goals for return to baseline health, Rest and regain strength, Eat a balanced diet  [Patient reports that she lost her taste from COVID, but sees some improvement today. ]   Is the patient/caregiver able to teach back signs and symptoms of worsening condition: Shortness of breath/rapid respiratory rate   [Patient reports that she feels lungs are good. ]   If the patient is a current smoker, are they able to teach back resources for cessation? Not a smoker   Is the patient/caregiver able to teach back the hierarchy of who to call/visit for symptoms/problems? PCP, Specialist, Home health nurse, Urgent Care, ED, 911 Yes   Week 2 call completed? Yes          JULITA ELLISON - Registered Nurse

## 2023-02-15 NOTE — TELEPHONE ENCOUNTER
Caller: BRITANY [facility]    Phone Number: [phone]    Reason for Call: JONE CALLED TO ADVISE SHE SEEN THE PATIENT ON Monday AND CHECKED ON HER HEAL WOUND AND SHE IS SENDING ANOTHER NURSE OUT JUST TO MAKE SURE ITS NOT INFECTED THEY ARE USING A FOAM DRESSING AND ALGINATE

## 2023-02-16 ENCOUNTER — OFFICE VISIT (OUTPATIENT)
Dept: INTERNAL MEDICINE | Facility: CLINIC | Age: 83
End: 2023-02-16
Payer: MEDICARE

## 2023-02-16 VITALS
HEART RATE: 76 BPM | DIASTOLIC BLOOD PRESSURE: 66 MMHG | SYSTOLIC BLOOD PRESSURE: 132 MMHG | WEIGHT: 160 LBS | BODY MASS INDEX: 27.46 KG/M2 | TEMPERATURE: 96.9 F | OXYGEN SATURATION: 100 %

## 2023-02-16 DIAGNOSIS — S91.309A WOUND OF FOOT: ICD-10-CM

## 2023-02-16 DIAGNOSIS — E11.65 TYPE 2 DIABETES MELLITUS WITH HYPERGLYCEMIA, WITH LONG-TERM CURRENT USE OF INSULIN: ICD-10-CM

## 2023-02-16 DIAGNOSIS — G62.9 NEUROPATHY: ICD-10-CM

## 2023-02-16 DIAGNOSIS — Z79.4 TYPE 2 DIABETES MELLITUS WITH HYPERGLYCEMIA, WITH LONG-TERM CURRENT USE OF INSULIN: ICD-10-CM

## 2023-02-16 DIAGNOSIS — U07.1 COVID-19: Primary | ICD-10-CM

## 2023-02-16 PROCEDURE — 99495 TRANSJ CARE MGMT MOD F2F 14D: CPT | Performed by: PHYSICIAN ASSISTANT

## 2023-02-16 PROCEDURE — 1111F DSCHRG MED/CURRENT MED MERGE: CPT | Performed by: PHYSICIAN ASSISTANT

## 2023-02-16 RX ORDER — GABAPENTIN 100 MG/1
100 CAPSULE ORAL 2 TIMES DAILY
Qty: 90 CAPSULE | Refills: 2
Start: 2023-02-16 | End: 2023-02-22 | Stop reason: SDUPTHER

## 2023-02-16 RX ORDER — LANCETS 30 GAUGE
1 EACH MISCELLANEOUS 3 TIMES DAILY
Qty: 100 EACH | Refills: 5 | Status: SHIPPED | OUTPATIENT
Start: 2023-02-16

## 2023-02-16 RX ORDER — INSULIN GLARGINE 100 [IU]/ML
15 INJECTION, SOLUTION SUBCUTANEOUS NIGHTLY
Start: 2023-02-16 | End: 2023-02-17 | Stop reason: SDUPTHER

## 2023-02-16 NOTE — PROGRESS NOTES
"Transitional Care Follow Up Visit  Subjective     Susan Anderson is a 82 y.o. female who presents for a transitional care management visit.    Within 48 business hours after discharge our office contacted her via telephone to coordinate her care and needs.      I reviewed and discussed the details of that call along with the discharge summary, hospital problems, inpatient lab results, inpatient diagnostic studies, and consultation reports with Susan.     Current outpatient and discharge medications have been reconciled for the patient.  Reviewed by: Levon Byers PA-C      Date of TCM Phone Call 2/2/2023   [facility]   Date of Admission 1/28/2023   Date of Discharge 2/2/2023   Discharge Disposition Home-Health Care [facility]     Risk for Readmission (LACE) Score: 17 (2/2/2023  6:00 AM)      History of Present Illness  Patient is an 82-year-old female in today for hospital discharge follow-up for COVID-19.  Hospital course was as follows: \"Susan Anderson is a 82 y.o. female with PMH significant for HTN, HLD, CAD, chronic combined systolic/diastolic CHF, paroxysmal atrial fibrillation, CKD III, PAD (s/p L SFA stent), PUD / prior GI bleeding (10/22), insulin-dependent DMII and ELIUD (on 2L NC QHS. Recently admitted to [facility] 1/3-1/20/23 after PEA arrest with subsequent ATN necessitating initiation of HD. Admitted to [facility] 1/28/23 for sepsis secondary to COVID-19, bradycardia and abnormal movements.      Sepsis, POA   COVID-19 infection  Hypothermia, bradycardia, abnormal movements  - CT head, chest, and A/P without evidence of infection. Showed known rib fractures, pulmonary edema w/ effusions. UA not c/w infection  - s/p teo hugger for hypothermia. Temps stable now  - Remdesivir deferred due to bradycardia  - Enhanced COVID isolation through 2/17/2022  - Continue Dexamethasone 6mg PO daily to complete total 10 days      CKD3 w/ recent LACEY necessitating " "HD   Hyperkalemia  - Started on HD last admission - etiology felt to be ATN from cardiac arrest +/- Vancomycin toxicity   - Tunneled HD catheter placed on 1/18/23 - was on T/Th/Sat HD  - Missed HD on 1/28 due to illness  - Has made good renal recovery - HD catheter removed on 1/31/22   - s/p Lokelma for hyperkalemia  - Labs stable. Follow up with nephrology arranged for 2/20/23 @ 11:45am   -  to draw BMP on 2/14/23  - AM renal panel      Acute hypoxic respiratory insufficiency  Acute on chronic HFrEF (41-45%) w/ pleural effusions  Essential hypertension  - Multiple admissions for CHF  - No ARNI / Aldactone or STLT2i due to renal dysfunction  - BB held due to bradycardia - now having some tachycardia  - s/p IV diuresis. Mild hypernatremia today - discussed with nephrology. Likely volume depletion - encouraged PO hydration   - Start Bumex 1mg PO daily at DC. No diuretics today   - Reduced Metoprolol XL to 25mg daily. Start Losartan 25mg PO daily  - Follow up with [facility] in 1 week  - Remains on 1L NC - will DC on home O2      pAfib  CAD  PAD  -s/p LT SFA stenting 9/2022  - [facility] was planned due to GI bleeding - delayed due to PEA arrest. Plans for future placement unclear  - Continue Plavix, Eliquis, Metoprolol and statin     DM type 2, a1c 8.1%, w/ long term use of insulin  Steroid-induced hyperglycemia  - Resume home insulin regimen at DC      Hx GIB  Chronic anemia  - Hgb stable  - Continue home Omeprazole     Anxiety/insomnia  - Good response to Hydroxyzine last night- will send home with PRN Rx for sleep.\" Patient has taken all medicines given to her upon hospital discharge as directed without any problems or side effects.  Overall feeling much better.  Recently had CBC and CMP as well as full renal panel completed earlier this week via home health.  Awaiting results.  Due to see specialist next week.  Patient accompanied by daughter who does report that patient has a \"splint\" on the heel of her " foot.  Taking longer to heal this time.  Needing referral to wound care for further evaluation and treatment.        The following portions of the patient's history were reviewed and updated as appropriate: allergies, current medications, past family history, past medical history, past social history, past surgical history and problem list.    Review of Systems   Constitutional: Negative for activity change, chills, fatigue, fever and unexpected weight change.   HENT: Negative for congestion, ear pain, postnasal drip, sinus pressure and sore throat.    Eyes: Negative for pain, discharge and redness.   Respiratory: Negative for cough, shortness of breath and wheezing.    Cardiovascular: Negative for chest pain, palpitations and leg swelling.   Gastrointestinal: Negative for diarrhea, nausea and vomiting.   Endocrine: Negative for cold intolerance and heat intolerance.   Genitourinary: Negative for decreased urine volume and dysuria.   Musculoskeletal: Negative for arthralgias and myalgias.   Skin: Positive for wound. Negative for rash.   Neurological: Negative for dizziness, light-headedness and headaches.   Hematological: Does not bruise/bleed easily.   Psychiatric/Behavioral: Negative for confusion, dysphoric mood and sleep disturbance. The patient is not nervous/anxious.        Objective   Physical Exam  Vitals and nursing note reviewed.   Constitutional:       General: She is not in acute distress.     Appearance: She is not ill-appearing.   HENT:      Head: Normocephalic.      Right Ear: Tympanic membrane, ear canal and external ear normal. There is no impacted cerumen.      Left Ear: Tympanic membrane, ear canal and external ear normal. There is no impacted cerumen.      Nose: No congestion or rhinorrhea.      Mouth/Throat:      Mouth: Mucous membranes are moist.      Pharynx: Oropharynx is clear. No oropharyngeal exudate or posterior oropharyngeal erythema.   Eyes:      General:         Right eye: No  discharge.         Left eye: No discharge.      Extraocular Movements: Extraocular movements intact.      Conjunctiva/sclera: Conjunctivae normal.      Pupils: Pupils are equal, round, and reactive to light.   Cardiovascular:      Rate and Rhythm: Normal rate and regular rhythm.      Heart sounds: Normal heart sounds. No murmur heard.    No friction rub. No gallop.   Pulmonary:      Effort: Pulmonary effort is normal. No respiratory distress.      Breath sounds: Normal breath sounds. No wheezing.   Abdominal:      General: Bowel sounds are normal. There is no distension.      Palpations: Abdomen is soft. There is no mass.      Tenderness: There is no abdominal tenderness.   Musculoskeletal:         General: No swelling. Normal range of motion.      Cervical back: Normal range of motion. No tenderness.      Right lower leg: No edema.      Left lower leg: No edema.   Lymphadenopathy:      Cervical: No cervical adenopathy.   Skin:     Findings: Lesion (Nonhealing fissure of left calcaneal region) present. No bruising, erythema or rash.   Neurological:      Mental Status: She is oriented to person, place, and time.      Gait: Gait normal.   Psychiatric:         Mood and Affect: Mood normal.         Behavior: Behavior normal.         Thought Content: Thought content normal.         Judgment: Judgment normal.         Assessment & Plan     1. COVID-19 (Primary)- much better, advised if symptoms/condition does not continue to improve or worsen to go back to ER immediately. Patient verbalized understanding of all instructions given and complied.    2. Type 2 diabetes mellitus with hyperglycemia, with long-term current use of insulin (HCC)- refilled insulin.    3. Neuropathy- refilled gabapentin.    4. Wound of foot- worse, referred to wound care.

## 2023-02-17 ENCOUNTER — HOSPITAL ENCOUNTER (OUTPATIENT)
Dept: PHYSICAL THERAPY | Facility: HOSPITAL | Age: 83
Setting detail: THERAPIES SERIES
Discharge: HOME | End: 2023-02-17
Payer: MEDICARE

## 2023-02-17 DIAGNOSIS — S91.302D OPEN WOUND OF LEFT HEEL, SUBSEQUENT ENCOUNTER: ICD-10-CM

## 2023-02-17 DIAGNOSIS — E11.65 TYPE 2 DIABETES MELLITUS WITH HYPERGLYCEMIA, WITH LONG-TERM CURRENT USE OF INSULIN: ICD-10-CM

## 2023-02-17 DIAGNOSIS — Z79.4 TYPE 2 DIABETES MELLITUS WITH HYPERGLYCEMIA, WITH LONG-TERM CURRENT USE OF INSULIN: ICD-10-CM

## 2023-02-17 DIAGNOSIS — S91.302D OPEN WOUND OF LEFT FOOT, SUBSEQUENT ENCOUNTER: Primary | ICD-10-CM

## 2023-02-17 DIAGNOSIS — R60.0 BILATERAL LOWER EXTREMITY EDEMA: ICD-10-CM

## 2023-02-17 PROCEDURE — 97162 PT EVAL MOD COMPLEX 30 MIN: CPT

## 2023-02-17 PROCEDURE — 29581 APPL MULTLAYER CMPRN SYS LEG: CPT

## 2023-02-17 RX ORDER — INSULIN GLARGINE 100 [IU]/ML
15 INJECTION, SOLUTION SUBCUTANEOUS NIGHTLY
Start: 2023-02-17 | End: 2023-02-20 | Stop reason: SDUPTHER

## 2023-02-17 NOTE — TELEPHONE ENCOUNTER
Rx Refill Note  Requested Prescriptions     Pending Prescriptions Disp Refills   • insulin glargine (Lantus) 100 UNIT/ML injection       Sig: Inject 15 Units under the skin into the appropriate area as directed Every Night.      Last office visit with prescribing clinician: 10/25/2022   Last telemedicine visit with prescribing clinician: 3/28/2023   Next office visit with prescribing clinician: 3/28/2023        2/2/2023                   Shaq Stovall MA  02/17/23, 15:14 EST

## 2023-02-17 NOTE — THERAPY EVALUATION
Outpatient Rehabilitation - Wound/Debridement Initial Eval  [facility]     Patient Name: Susan Anderson  : 1940  MRN: [id number]  Today's Date: 2023                  Admit Date: 2023    Visit Dx:    ICD-10-CM ICD-9-CM   1. Open wound of left foot, subsequent encounter  S91.302D V58.89     892.0   2. Open wound of left heel, subsequent encounter  S91.302D V58.89     892.0   3. Bilateral lower extremity edema  R60.0 782.3   L foot (closed):    Post/med L heel:    L shin skin tear:      Patient Active Problem List   Diagnosis   • Diabetic foot ulcer ([facility])   • PVD (peripheral vascular disease) ([facility])   • Osteomyelitis ([facility])   • Diabetes mellitus with neuropathy ([facility])   • Essential hypertension   • Stage 3a chronic kidney disease ([facility])   • Pyogenic inflammation of bone ([facility])   • Hyperglycemia   • Pneumonia due to infectious organism   • Mitral valve disease   • NICM (nonischemic cardiomyopathy) ([facility])   • CAD   • Dyslipidemia   • Chronic combined systolic and diastolic heart failure    • Lumbar stenosis with neurogenic claudication   • Spondylosis of lumbar region without myelopathy or radiculopathy   • Spondylolisthesis, lumbar region   • Degeneration of lumbar or lumbosacral intervertebral disc   • Gait disturbance   • Physical deconditioning   • Sarcopenia   • Weakness   • Anemia   • Fall   • Dizziness   • Demand ischemia ([facility])   • Effusion of right knee   • Right knee pain   • Pressure injury of skin of sacral region   • Sleep apnea   • GIB (gastrointestinal bleeding)   • Vasovagal syncope   • Hypomagnesemia   • Syncope, unspecified syncope type   • Paroxysmal atrial fibrillation   • Ulcer of esophagus without bleeding   • T2DM    • COVID-19   • Sepsis ([facility])        Past Medical History:   Diagnosis Date   • Anxiety    • Arthritis    • Asthma / - double pneumonia    Currently on inhaler and nebulizer   • Atrial fibrillation ([facility]) 2022   • Cancer ([facility])     cervical cancer, skin cancer   •  CHF (congestive heart failure) ([facility]) 06/04/2021   • Chronic kidney disease Related to diabetes   • Coronary artery disease 6/4/2021 DX for hear failure   • Diabetes mellitus ([facility]) 30 years    Seeing Dr. Lam 1st time Aug 19   • GERD (gastroesophageal reflux disease)    • Gout    • History of staph infection 10/2021    right toe   • History of transfusion     no reactions, 1 unit, [facility]   • Hx of colonoscopy    • Hyperlipidemia Reference current labs x 2-3yrs approx   • Hypertension 30 years   • Insomnia    • Migraine    • Mitral valve disease    • Mitral valve disease    • Osteomyelitis ([facility])    • Peripheral neuropathy    • Sleep apnea    • Type 2 diabetes mellitus ([facility])     30 years        Past Surgical History:   Procedure Laterality Date   • ABDOMINAL HYSTERECTOMY W/SALPINGECTOMY     • AMPUTATION  Right great toe, 1st 1/3 metatarsal -Reg 4/14/21   • AORTAGRAM N/A 04/09/2021    Procedure: AORTAGRAM WITH OR WITHOUT RUNOFFS, WITH Co2;  Surgeon: Trey Forrest MD;  Location:  [facility];  Service: Vascular;  Laterality: N/A;   • AORTAGRAM N/A 09/28/2022    Procedure: CO2 ANGIOGRAM, LEFT SFA ANGIOPLASTY WITH DRUG ELUTING BALLOON, LEFT SFA STENT PLACEMENT ;  Surgeon: Trey Forrest MD;  Location:  [facility];  Service: Vascular;  Laterality: N/A;  FLUORO: 13.12  DOSE 162mGy  CONTRAST: Isovue 350: 15ml   • APPENDECTOMY     • BRAIN TUMOR EXCISION      laser surgery    • CARDIAC CATHETERIZATION N/A 06/21/2021    Procedure: LEFT HEART CATH;  Surgeon: Anjum Ceballos MD;  Location:  [facility];  Service: Cardiology;  Laterality: N/A;   • CATARACT EXTRACTION W/ INTRAOCULAR LENS  IMPLANT, BILATERAL     • CHOLECYSTECTOMY     • COLONOSCOPY     • ENDOSCOPY N/A 10/07/2022    Procedure: ESOPHAGOGASTRODUODENOSCOPY;  Surgeon: Stef Veras MD;  Location:  [facility];  Service: Gastroenterology;  Laterality: N/A;   • EYE SURGERY     • FEMORAL ARTERY STENT  10/2022   • HYSTERECTOMY     •  "INTERVENTIONAL RADIOLOGY PROCEDURE N/A 1/15/2023    Procedure: CV INTERVENTIONAL RADIOLOGY PROCEDURE;  Surgeon: Sanket Zapata MD;  Location:  [facility];  Service: Interventional Radiology;  Laterality: N/A;   • TOE SURGERY     • TRANS METATARSAL AMPUTATION Right 04/14/2021    Procedure: AMPUTATION TRANS METATARSAL RIGHT GREAT TOE;  Surgeon: Valdez Finney MD;  Location:  [facility];  Service: Vascular;  Laterality: Right;        Patient History     Row Name 02/17/23 0800             History    Chief Complaint Ulcer, wound or other skin conditions;Pain;Swelling  -JM      Brief Description of Current Complaint Pt with ongoing L foot wounds being treated as OP at [facility] PT wound care prior to recent inpatient admission for COVID, sepsis, and cardiac issues.  -         Services    Are you currently receiving Home Health services Yes  daughter called  agency today and requested discharge as of 2/15 so they can have OP services  -      What agency are you receiving Home Health services [facility]   -      Do you plan to receive Home Health services in the near future Yes  after d/c from OP services  -         Daily Activities    Pt Participated in POC and Goals Yes  -            User Key  (r) = Recorded By, (t) = Taken By, (c) = Cosigned By    Initials Name Provider Type    Shi Mane, PT Physical Therapist                EVALUATION   PT Ortho     Row Name 02/17/23 0800       Subjective Comments    Subjective Comments Daughter, Lisa, reports she has been changing L heel dressings with mepilex ag and 6\" optifoam provided during previous episode.  Pt with small skin tear L shin improving, dorsal foot is healed, but pt still having pain in L heel and new BLE edema.  -       Subjective Pain    Able to rate subjective pain? yes  -    Pre-Treatment Pain Level 3  -    Post-Treatment Pain Level 3  -    Subjective Pain Comment L heel  -       Transfers    Comment, (Transfers) " "seated in w/c  -JM          User Key  (r) = Recorded By, (t) = Taken By, (c) = Cosigned By    Initials Name Provider Type    Shi Mane PT Physical Therapist               [facility]DA Wound     Row Name 02/17/23 0800             Wound 01/28/23 1613 Left posterior heel Pressure Injury    Wound - Properties Group Placement Date: 01/28/23  -WR Placement Time: 1613 -WR Present on Hospital Admission: Y  -WR Side: Left  -WR Orientation: posterior  -WR Location: heel  -WR Primary Wound Type: Pressure inj  -WR    Wound Image Images linked: 1  -JM      Dressing Appearance intact;moist drainage  mepilex ag with 6\" optifoam gentle  -JM      Base moist;necrotic;yellow;slough;pink  -JM      Periwound intact;blanchable;pink  -      Periwound Temperature warm  -      Periwound Skin Turgor soft  -      Edges open  -      Wound Length (cm) 1.2 cm  -JM      Wound Width (cm) 3 cm  -JM      Wound Depth (cm) 0.1 cm  obscured by slough  -      Wound Surface Area (cm^2) 3.6 cm^2  -JM      Wound Volume (cm^3) 0.36 cm^3  -JM      Drainage Characteristics/Odor serosanguineous;yellow  -      Drainage Amount small  -      Care, Wound cleansed with;wound cleanser;debrided;honey applied  -      Dressing Care dressing applied;silver impregnated;foam;border dressing;multi-layer wrap  mepilex ag, 6\" optifoam, MLW  -JM      Periwound Care cleansed with pH balanced cleanser;dry periwound area maintained  -      Retired Wound - Properties Group Placement Date: 01/28/23  -WR Placement Time: 1613 -WR Present on Hospital Admission: Y  -WR Side: Left  -WR Orientation: posterior  -WR Location: heel  -WR Primary Wound Type: Pressure inj  -WR    Retired Wound - Properties Group Date first assessed: 01/28/23  -WR Time first assessed: 1613 -WR Present on Hospital Admission: Y  -WR Side: Left  -WR Location: heel  -WR Primary Wound Type: Pressure inj  -WR       Wound 02/17/23 0800 Left anterior leg Skin Tear    Wound - Properties Group " "Placement Date: 02/17/23  - Placement Time: 0800  - Side: Left  - Orientation: anterior  - Location: leg  - Primary Wound Type: Skin tear  -JM    Wound Image Images linked: 1  -JM      Dressing Appearance intact;moist drainage  4\" optifoam  -JM      Base moist;purple;red  -JM      Periwound intact;ecchymotic;swelling  -      Periwound Temperature warm  -      Periwound Skin Turgor soft  -JM      Edges jagged;open  -JM      Wound Length (cm) 0.5 cm  -JM      Wound Width (cm) 1 cm  -JM      Wound Depth (cm) 0.1 cm  -JM      Wound Surface Area (cm^2) 0.5 cm^2  -JM      Wound Volume (cm^3) 0.05 cm^3  -JM      Drainage Characteristics/Odor sanguineous  -JM      Drainage Amount scant  -JM      Care, Wound cleansed with;wound cleanser;debrided  -      Dressing Care dressing applied;petroleum-based;gauze;foam;border dressing;multi-layer wrap  xeroform, 4\" optifoam, MLW  -JM      Periwound Care cleansed with pH balanced cleanser;dry periwound area maintained  -      Retired Wound - Properties Group Placement Date: 02/17/23  - Placement Time: 0800  - Side: Left  - Orientation: anterior  - Location: leg  - Primary Wound Type: Skin tear  -JM    Retired Wound - Properties Group Date first assessed: 02/17/23  [facility] Time first assessed: 0800  - Side: Left  - Location: leg  - Primary Wound Type: Skin tear  -JM          User Key  (r) = Recorded By, (t) = Taken By, (c) = Cosigned By    Initials Name Provider Type    Shi Mane PT Physical Therapist    Aditi Shafer, RN Registered Nurse               Lymphedema     Row Name 02/17/23 0800             Lymphedema Edema Assessment    Pitting Edema Moderate  -         Skin Changes/Observations    Location/Assessment Lower Extremity  -      Lower Extremity Conditions bilateral:;clean;dry;shiny;fragile  -      Lower Extremity Color/Pigment bilateral:;blanchable;hyperpigmented;purple  areas of ecchymosis  -         Lymphedema " Pulses/Capillary Refill    Lymphedema Pulses/Capillary Refill lower extremity pulses;capillary refill  -      Dorsalis Pedis Pulse right:;left:;+1 diminished  -      Posterior Tibialis Pulse right:;left:;+1 diminished  -JM      Capillary Refill lower extremity capillary refill  -      Lower Extremity Capillary Refill right:;left:;less than 3 seconds  -         Lymphedema Measurements    Measurement Type(s) Quick Girth  -JM      Quick Girth Areas Lower extremities  -         LLE Quick Girth (cm)    Mid foot 21 cm  -JM      Smallest ankle 20.8 cm  -JM      Largest calf 31.8 cm  -JM         RLE Quick Girth (cm)    Mid foot 20.8 cm  -JM      Smallest ankle 21.2 cm  -JM      Largest calf 31.8 cm  -JM      RLE Quick Girth Total 73.8  -JM         Compression/Skin Care    Compression/Skin Care skin care;wrapping location;bandaging  -      Skin Care washed/dried;lotion applied  -      Wrapping Location lower extremity  -      Wrapping Location LE bilateral:;foot to knee  -      Wrapping Comments size 4 compressogrips doubled distal 1/3 for gradient compression  -            User Key  (r) = Recorded By, (t) = Taken By, (c) = Cosigned By    Initials Name Provider Type    Shi Mane, PT Physical Therapist                WOUND DEBRIDEMENT  Total area of Debridement: 2cmsq  Debridement Site 1  Location- Site 1: L heel  Selective Debridement- Site 1: Wound Surface <20cmsq  Instruments- Site 1: tweezers, #15, scapel  Excised Tissue Description- Site 1: minimum, slough  Bleeding- Site 1: none   Debridement Site 2  Location- Site 2: L shin skin tear  Selective Debridement- Site 2: Wound Surface <20cmsq  Instruments- Site 2: tweezers  Excised Tissue Description- Site 2: scant, other (comment) (residual hematoma/bloody drainage)          Therapy Education     Row Name 02/17/23 0800             Therapy Education    Education Details Continue dressing changes every 3 days, clean with theraworx and gauze,  "apply xeroform and 4\" optifoam to L shin, small dab of therahoney with ag foam and 6\" optifoam to L heel.  Recommended daily use of compressogrips to BLE for pitting edema, though may remove at night for skin breaks.  Educated on monitoring toes for circulation and anterior ankles for risk of skin breakdown.  Continuation of OP tx with plan to resume HH if desired after d/c from OP tx.  -VERONICA      Given Symptoms/condition management;Bandaging/dressing change  -VERONICA      Program Reinforced;Modified  -VERONICA      How Provided Verbal;Demonstration  -VERONICA      Provided to Patient;Caregiver  daughterLisa  -VERONICA      Level of Understanding Verbalized;Teach back education performed  -VERONICA            User Key  (r) = Recorded By, (t) = Taken By, (c) = Cosigned By    Initials Name Provider Type    Shi Mane, PT Physical Therapist                Recommendation and Plan   PT Assessment/Plan     Row Name 02/17/23 0800          PT Assessment    Functional Limitations Performance in self-care ADL;Impaired gait;Other (comment)  wound management limitations  -VERONICA     Impairments Integumentary integrity;Pain;Impaired sensory integrity  -VERONICA     Assessment Comments Pt returning for OP tx s/p inpatient admission, continues to present with open wound to L heel at site of previous skin fissure.  R heel area closed today as well as dorsal L foot wound.  Pt with new skin tear to anterior L shin, also presenting with moderate pitting edema of BLE with shiny skin integrity at risk for weeping/blistering, so PT added light compression with compressogrips to reduce edema.  Pt's daughter demonstrating dressing changes without issues.  Pt with slough on L heel wound that will benefit from continued debridement, and pt will benefit from advanced dressings management, and close monitoring of compression use by skilled therapist due to risk of skin breakdown and h/o vascular dz.  -     Rehab Potential Fair  -VERONICA     Patient/caregiver participated " in establishment of treatment plan and goals Yes  -     Patient would benefit from skilled therapy intervention Yes  -        PT Plan    PT Frequency 1x/week  -     Predicted Duration of Therapy Intervention (PT) 12 visits  -     Planned CPT's? PT EVAL MOD COMPLELITY: 66731;PT PIPE DEBRIDE OPEN WOUND UP TO 20 CM: 74489;PT NONSELECT DEBRIDE 15 MIN: 34351;PT MULTI LAYER COMP SYS LE;PT NLFU MIST: 13781  -     Physical Therapy Interventions (Optional Details) patient/family education;wound care  -     PT Plan Comments debridement, dressings, light compression  -           User Key  (r) = Recorded By, (t) = Taken By, (c) = Cosigned By    Initials Name Provider Type    Shi Mane, PT Physical Therapist                  Goals   PT OP Goals     Row Name 23 0800          PT Short Term Goals    STG 1 Reduce wound dimensions by 25% as evidence of wound healing.  -     STG 2 Increase granulation formation to 25% of wound bed or greater, to promote wound closure.  -        Long Term Goals    LTG 1 Pt/family independent with home dressing changes.  -     LTG 2 L foot wound to be closed/resurfaced to allow for full indepenent function.  -     LTG 3 Patient/family indpendent with compression use for home edema management.  -        Time Calculation    PT Goal Re-Cert Due Date 23  -           User Key  (r) = Recorded By, (t) = Taken By, (c) = Cosigned By    Initials Name Provider Type    Shi Mane, PT Physical Therapist                Time Calculation: Start Time: 0800  Untimed Charges  PT Eval/Re-eval Minutes: 40  Wound Care: 46634 Selective debridement, 56075 Multilayer comp below knee  19438-Wyooktvtmc comp below knee: 10  23201-Dkfpikhhb debridement: 10  Total Minutes  Untimed Charges Total Minutes: 60   Total Minutes: 60  Therapy Charges for Today     Code Description Service Date Service Provider Modifiers Qty    76256032528 HC PT MULTI LAYER COMP SYS BELOW  KNEE 2/17/2023 Shi Cordoba, PT GP 1    32824599232 HC PT EVAL MOD COMPLEXITY 3 2/17/2023 Shi Cordoba, PT GP 1                Shi Cordoba, PT  2/17/2023

## 2023-02-17 NOTE — TELEPHONE ENCOUNTER
Caller: [facility]. [place], [place] - [address] - [phone]  - [phone] FX    Relationship: Pharmacy    Best call back number: [phone]    Requested Prescriptions:   Requested Prescriptions     Pending Prescriptions Disp Refills   • insulin glargine (Lantus) 100 UNIT/ML injection       Sig: Inject 15 Units under the skin into the appropriate area as directed Every Night.        Pharmacy where request should be sent: [facility]. [place], [place] - [address] - [phone]  - [phone] FX     Additional details provided by patient: PHARMACY STATES THAT THE PATIENT NEEDS THIS SENT IN.    Does the patient have less than a 3 day supply:  [x] Yes  [] No    Would you like a call back once the refill request has been completed: [x] Yes [] No    If the office needs to give you a call back, can they leave a voicemail: [x] Yes [] No    Kenyon Arenas Rep   02/17/23 13:54 EST

## 2023-02-20 ENCOUNTER — TELEPHONE (OUTPATIENT)
Dept: INTERNAL MEDICINE | Facility: CLINIC | Age: 83
End: 2023-02-20
Payer: MEDICARE

## 2023-02-20 DIAGNOSIS — E11.65 TYPE 2 DIABETES MELLITUS WITH HYPERGLYCEMIA, WITH LONG-TERM CURRENT USE OF INSULIN: ICD-10-CM

## 2023-02-20 DIAGNOSIS — Z79.4 TYPE 2 DIABETES MELLITUS WITH HYPERGLYCEMIA, WITH LONG-TERM CURRENT USE OF INSULIN: ICD-10-CM

## 2023-02-20 RX ORDER — INSULIN GLARGINE 100 [IU]/ML
15 INJECTION, SOLUTION SUBCUTANEOUS NIGHTLY
Start: 2023-02-20 | End: 2023-02-21 | Stop reason: SDUPTHER

## 2023-02-20 NOTE — TELEPHONE ENCOUNTER
Pharmacy Name:  [facility]     Pharmacy representative name: CAMRON    Pharmacy representative phone number: [phone]    What medication are you calling in regards to: insulin glargine (Lantus) 100 UNIT/ML injectio    What question does the pharmacy have: PATIENT IS STILL WAITING ON THE LANTUS TO BE CALLED IN      Who is the provider that prescribed the medication: MAY CID    Additional notes: LANCETS WERE CALLED IN INSTEAD OF LANTUS AND THE PATIENT IS IN NEED OF HER INSULIN

## 2023-02-21 DIAGNOSIS — E11.65 TYPE 2 DIABETES MELLITUS WITH HYPERGLYCEMIA, WITH LONG-TERM CURRENT USE OF INSULIN: ICD-10-CM

## 2023-02-21 DIAGNOSIS — Z79.4 TYPE 2 DIABETES MELLITUS WITH HYPERGLYCEMIA, WITH LONG-TERM CURRENT USE OF INSULIN: ICD-10-CM

## 2023-02-21 RX ORDER — INSULIN GLARGINE 100 [IU]/ML
15 INJECTION, SOLUTION SUBCUTANEOUS NIGHTLY
Start: 2023-02-21 | End: 2023-02-24 | Stop reason: SDUPTHER

## 2023-02-21 NOTE — TELEPHONE ENCOUNTER
Caller: Susan Anderson    Relationship: Self    Best call back number:     Requested Prescriptions:   Requested Prescriptions     Signed Prescriptions Disp Refills   • insulin glargine (Lantus) 100 UNIT/ML injection       Sig: Inject 15 Units under the skin into the appropriate area as directed Every Night.     Authorizing Provider: KOLBY HO     [facility]: [facility]        Pharmacy where request should be sent:  [facility], [place] [address] [address] - [phone]  - [phone] FX    Additional details provided by patient: checking on this, please call when done    Does the patient have less than a 3 day supply:  [x] Yes  [] No    Would you like a call back once the refill request has been completed: [x] Yes [] No     If the office needs to give you a call back, can they leave a voicemail: [] Yes [x] No    Benja Garces, PCT   02/21/23 14:13 EST

## 2023-02-21 NOTE — TELEPHONE ENCOUNTER
Rx Refill Note  Requested Prescriptions     Pending Prescriptions Disp Refills   • insulin glargine (Lantus) 100 UNIT/ML injection       Sig: Inject 15 Units under the skin into the appropriate area as directed Every Night.      Last office visit with prescribing clinician: 10/25/2022   Last telemedicine visit with prescribing clinician: 3/28/2023   Next office visit with prescribing clinician: 3/28/2023        2/2/2023                 Shaq Stovall MA  02/21/23, 11:04 EST

## 2023-02-22 DIAGNOSIS — E11.65 TYPE 2 DIABETES MELLITUS WITH HYPERGLYCEMIA, WITH LONG-TERM CURRENT USE OF INSULIN: ICD-10-CM

## 2023-02-22 DIAGNOSIS — G62.9 NEUROPATHY: ICD-10-CM

## 2023-02-22 DIAGNOSIS — Z79.4 TYPE 2 DIABETES MELLITUS WITH HYPERGLYCEMIA, WITH LONG-TERM CURRENT USE OF INSULIN: ICD-10-CM

## 2023-02-22 RX ORDER — GABAPENTIN 100 MG/1
100 CAPSULE ORAL 2 TIMES DAILY
Qty: 90 CAPSULE | Refills: 0
Start: 2023-02-22

## 2023-02-23 ENCOUNTER — READMISSION MANAGEMENT (OUTPATIENT)
Dept: CALL CENTER | Facility: HOSPITAL | Age: 83
End: 2023-02-23
Payer: MEDICARE

## 2023-02-23 ENCOUNTER — TELEPHONE (OUTPATIENT)
Dept: INTERNAL MEDICINE | Facility: CLINIC | Age: 83
End: 2023-02-23
Payer: MEDICARE

## 2023-02-23 RX ORDER — AMOXICILLIN AND CLAVULANATE POTASSIUM 875; 125 MG/1; MG/1
1 TABLET, FILM COATED ORAL 2 TIMES DAILY
Qty: 20 TABLET | Refills: 0 | Status: SHIPPED | OUTPATIENT
Start: 2023-02-23 | End: 2023-03-05

## 2023-02-23 NOTE — OUTREACH NOTE
Sepsis Week 3 Survey    Flowsheet Row Responses   [facility] patient discharged from? [place]   Does the patient have one of the following disease processes/diagnoses(primary or secondary)? Sepsis   Week 3 attempt successful? Yes   Call start time 1101   Call end time 1114   Discharge diagnosis Sepsis R/T COVID   Meds reviewed with patient/caregiver? Yes   Is the patient having any side effects they believe may be caused by any medication additions or changes? No   Does the patient have all medications related to this admission filled (includes all antibiotics, inhalers, nebulizers,steroids,etc.) Yes   Is the patient taking all medications as directed (includes completed medication regime)? Yes   Does the patient have a primary care provider?  Yes   Has the patient kept scheduled appointments due by today? Yes   Has home health visited the patient within 72 hours of discharge? N/A   Psychosocial issues? No   Comments states pulse ox readings 95% on RA, wears O2 at night, plans to start outpt PT to replace HH PT   Did the patient receive a copy of their discharge instructions? Yes   Nursing interventions Reviewed instructions with patient   What is the patient's perception of their health status since discharge? Improving   Nursing interventions Nurse provided patient education   Is the patient/caregiver able to teach back TIME? T emperature - higher or lower than normal, I nfection - may have signs and symptoms of an infection, M ental Decline - confused, sleepy, difficult to arouse, E xtremely Ill - severe pain, discomfort, shortness of breath   Nursing interventions Nurse provided reassurance to patient   Is patient/caregiver able to teach back steps to recovery at home? Set small, achievable goals for return to baseline health, Rest and regain strength, Eat a balanced diet, Exercise as tolerated   Is the patient/caregiver able to teach back signs and symptoms of worsening condition: Fever, Hyperthermia,  Rapid heart rate (>90), Shortness of breath/rapid respiratory rate, Altered mental status(confusion/coma), Edema   Is the patient/caregiver able to teach back the hierarchy of who to call/visit for symptoms/problems? PCP, Specialist, Home health nurse, Urgent Care, ED, 911 Yes   Additional teach back comments states feeling better, appetite improving   Week 3 call completed? Yes          Arleen MCKEON - Registered Nurse

## 2023-02-23 NOTE — TELEPHONE ENCOUNTER
Farheen called from [facility] called. They faxed us orders to be signed. Dr angulo has to be the one to sign the orders- dr bravo signed for her and [facility] said dr angulo must be the one to sign- unless someone else is to take over patient's care.    Farheen is sending new orders to our fax. Please check for it later and ask dr angulo to sign.    If you have questions call farheen:    [phone]

## 2023-02-24 ENCOUNTER — OUTSIDE FACILITY SERVICE (OUTPATIENT)
Dept: INTERNAL MEDICINE | Facility: CLINIC | Age: 83
End: 2023-02-24
Payer: MEDICARE

## 2023-02-24 ENCOUNTER — TELEPHONE (OUTPATIENT)
Dept: INTERNAL MEDICINE | Facility: CLINIC | Age: 83
End: 2023-02-24
Payer: MEDICARE

## 2023-02-24 ENCOUNTER — HOSPITAL ENCOUNTER (OUTPATIENT)
Dept: PHYSICAL THERAPY | Facility: HOSPITAL | Age: 83
Setting detail: THERAPIES SERIES
Discharge: HOME | End: 2023-02-24
Payer: MEDICARE

## 2023-02-24 DIAGNOSIS — S81.811A ISTAP TYPE 2 SKIN TEAR OF RIGHT LOWER LEG: ICD-10-CM

## 2023-02-24 DIAGNOSIS — S91.302D OPEN WOUND OF LEFT HEEL, SUBSEQUENT ENCOUNTER: Primary | ICD-10-CM

## 2023-02-24 DIAGNOSIS — Z79.4 TYPE 2 DIABETES MELLITUS WITH HYPERGLYCEMIA, WITH LONG-TERM CURRENT USE OF INSULIN: ICD-10-CM

## 2023-02-24 DIAGNOSIS — R60.0 BILATERAL LOWER EXTREMITY EDEMA: ICD-10-CM

## 2023-02-24 DIAGNOSIS — E11.65 TYPE 2 DIABETES MELLITUS WITH HYPERGLYCEMIA, WITH LONG-TERM CURRENT USE OF INSULIN: ICD-10-CM

## 2023-02-24 PROCEDURE — G0180 MD CERTIFICATION HHA PATIENT: HCPCS | Performed by: INTERNAL MEDICINE

## 2023-02-24 PROCEDURE — 29581 APPL MULTLAYER CMPRN SYS LEG: CPT

## 2023-02-24 PROCEDURE — 97597 DBRDMT OPN WND 1ST 20 CM/<: CPT

## 2023-02-24 RX ORDER — INSULIN GLARGINE 100 [IU]/ML
15 INJECTION, SOLUTION SUBCUTANEOUS NIGHTLY
Start: 2023-02-24 | End: 2023-02-24 | Stop reason: SDUPTHER

## 2023-02-24 NOTE — TELEPHONE ENCOUNTER
Caller: [facility]. - [place], [place] - [facility] [address] - [phone]  - [phone] FX    Relationship: Pharmacy      What was the call regarding: Insulin Glargine (LANTUS SOLOSTAR) 100 UNIT/ML injection pen    QUANTITY CHANGE, PHARMACY NEEDS QUANTITY OF 15. THEY CAN NOT OPEN BOXES.     Do you require a callback: YES

## 2023-02-24 NOTE — THERAPY WOUND CARE TREATMENT
Outpatient Rehabilitation - Wound/Debridement Treatment Note  [facility]     Patient Name: Susan Anderson  : 1940  MRN: [id number]  Today's Date: 2023                 Admit Date: 2023    Visit Dx:    ICD-10-CM ICD-9-CM   1. Open wound of left heel, subsequent encounter  S91.302D V58.89     892.0   2. Bilateral lower extremity edema  R60.0 782.3   3. ISTAP type 2 skin tear of right lower leg  S81.811A 891.0   L heel:    RLE skin tears (new):    LLE skin tear:      Patient Active Problem List   Diagnosis   • Diabetic foot ulcer ([facility])   • PVD (peripheral vascular disease) ([facility])   • Osteomyelitis ([facility])   • Diabetes mellitus with neuropathy ([facility])   • Essential hypertension   • Stage 3a chronic kidney disease ([facility])   • Pyogenic inflammation of bone ([facility])   • Hyperglycemia   • Pneumonia due to infectious organism   • Mitral valve disease   • NICM (nonischemic cardiomyopathy) ([facility])   • CAD   • Dyslipidemia   • Chronic combined systolic and diastolic heart failure    • Lumbar stenosis with neurogenic claudication   • Spondylosis of lumbar region without myelopathy or radiculopathy   • Spondylolisthesis, lumbar region   • Degeneration of lumbar or lumbosacral intervertebral disc   • Gait disturbance   • Physical deconditioning   • Sarcopenia   • Weakness   • Anemia   • Fall   • Dizziness   • Demand ischemia ([facility])   • Effusion of right knee   • Right knee pain   • Pressure injury of skin of sacral region   • Sleep apnea   • GIB (gastrointestinal bleeding)   • Vasovagal syncope   • Hypomagnesemia   • Syncope, unspecified syncope type   • Paroxysmal atrial fibrillation   • Ulcer of esophagus without bleeding   • T2DM    • COVID-19   • Sepsis ([facility])        Past Medical History:   Diagnosis Date   • Anxiety    • Arthritis    • Asthma / - double pneumonia    Currently on inhaler and nebulizer   • Atrial fibrillation ([facility]) 2022   • Cancer ([facility])     cervical cancer, skin cancer   • CHF (congestive heart  failure) ([facility]) 06/04/2021   • Chronic kidney disease Related to diabetes   • Coronary artery disease 6/4/2021 DX for hear failure   • Diabetes mellitus (HCC) 30 years    Seeing Dr. Lam 1st time Aug 19   • GERD (gastroesophageal reflux disease)    • Gout    • History of staph infection 10/2021    right toe   • History of transfusion     no reactions, 1 unit, [facility]   • Hx of colonoscopy    • Hyperlipidemia Reference current labs x 2-3yrs approx   • Hypertension 30 years   • Insomnia    • Migraine    • Mitral valve disease    • Mitral valve disease    • Osteomyelitis (HCC)    • Peripheral neuropathy    • Sleep apnea    • Type 2 diabetes mellitus (HCC)     30 years        Past Surgical History:   Procedure Laterality Date   • ABDOMINAL HYSTERECTOMY W/SALPINGECTOMY     • AMPUTATION  Right great toe, 1st 1/3 metatarsal -[place] 4/14/21   • AORTAGRAM N/A 04/09/2021    Procedure: AORTAGRAM WITH OR WITHOUT RUNOFFS, WITH Co2;  Surgeon: Trey Forrest MD;  Location:  [facility];  Service: Vascular;  Laterality: N/A;   • AORTAGRAM N/A 09/28/2022    Procedure: CO2 ANGIOGRAM, LEFT SFA ANGIOPLASTY WITH DRUG ELUTING BALLOON, LEFT SFA STENT PLACEMENT ;  Surgeon: Trey Forrest MD;  Location:  [facility];  Service: Vascular;  Laterality: N/A;  FLUORO: 13.12  DOSE 162mGy  CONTRAST: Isovue 350: 15ml   • APPENDECTOMY     • BRAIN TUMOR EXCISION      laser surgery    • CARDIAC CATHETERIZATION N/A 06/21/2021    Procedure: LEFT HEART CATH;  Surgeon: Anjum Ceballos MD;  Location:  [facility] CATH INVASIVE LOCATION;  Service: Cardiology;  Laterality: N/A;   • CATARACT EXTRACTION W/ INTRAOCULAR LENS  IMPLANT, BILATERAL     • CHOLECYSTECTOMY     • COLONOSCOPY     • ENDOSCOPY N/A 10/07/2022    Procedure: ESOPHAGOGASTRODUODENOSCOPY;  Surgeon: Stef Veras MD;  Location:  [facility] ENDOSCOPY;  Service: Gastroenterology;  Laterality: N/A;   • EYE SURGERY     • FEMORAL ARTERY STENT  10/2022   • HYSTERECTOMY     • INTERVENTIONAL RADIOLOGY  "PROCEDURE N/A 1/15/2023    Procedure: CV INTERVENTIONAL RADIOLOGY PROCEDURE;  Surgeon: Sanket Zapata MD;  Location:  [facility] CATH INVASIVE LOCATION;  Service: Interventional Radiology;  Laterality: N/A;   • TOE SURGERY     • TRANS METATARSAL AMPUTATION Right 04/14/2021    Procedure: AMPUTATION TRANS METATARSAL RIGHT GREAT TOE;  Surgeon: Valdez Finney MD;  Location:  [facility] OR;  Service: Vascular;  Laterality: Right;         EVALUATION   PT Ortho     Row Name 02/24/23 1300       Subjective Comments    Subjective Comments Pt states she fell in her bathroom this week, sustaining new skin tears to RLE.  Daughter bandaged them with xeroform and optifoam gentle provided last tx.  -JM       Subjective Pain    Able to rate subjective pain? yes  -JM    Pre-Treatment Pain Level 4  -JM    Post-Treatment Pain Level 4  -JM       Transfers    Comment, (Transfers) seated in electric w/c for tx  -JM          User Key  (r) = Recorded By, (t) = Taken By, (c) = Cosigned By    Initials Name Provider Type    Shi Mane, PT Physical Therapist                 [facility] Wound     Row Name 02/24/23 1300             Wound 02/24/23 1300 Right proximal leg Skin Tear    Wound - Properties Group Placement Date: 02/24/23  - Placement Time: 1300  -JM Side: Right  - Orientation: proximal  -JM Location: leg  - Primary Wound Type: Skin tear  -JM    Wound Image Images linked: 1  -JM      Dressing Appearance intact;moist drainage  xeroform and 6\" optifoam placed by family  -      Base maroon/purple;moist;purple;red;yeast;slough  purple skin flaps, slightly rolled  -      Periwound ecchymotic;edematous;redness;swelling  -      Periwound Temperature warm  -      Periwound Skin Turgor soft  -      Edges jagged;open  -JM      Wound Length (cm) 4 cm  superior; inferiorly 3cm x1.0cm x obscured  -JM      Wound Width (cm) 1.5 cm  -JM      Wound Depth (cm) --  obscured  -JM      Wound Surface Area (cm^2) 6 cm^2  -JM      Drainage " "Characteristics/Odor sanguineous  -JM      Drainage Amount small  -JM      Care, Wound cleansed with;wound cleanser;debrided  -JM      Dressing Care dressing applied;petroleum-based;gauze;low-adherent;border dressing;multi-layer wrap  xeroform, 6\" optifoam, MLW  -JM      Periwound Care cleansed with pH balanced cleanser;dry periwound area maintained  -      Retired Wound - Properties Group Placement Date: 02/24/23  - Placement Time: 1300  -JM Side: Right  - Orientation: proximal  - Location: leg  -JM Primary Wound Type: Skin tear  -JM    Retired Wound - Properties Group Date first assessed: 02/24/23  - Time first assessed: 1300  -JM Side: Right  - Location: leg  -JM Primary Wound Type: Skin tear  -JM       Wound 02/17/23 0800 Left anterior leg Skin Tear    Wound - Properties Group Placement Date: 02/17/23  - Placement Time: 0800  - Side: Left  - Orientation: anterior  - Location: leg  -JM Primary Wound Type: Skin tear  -JM    Wound Image Images linked: 1  -JM      Dressing Appearance intact;dried drainage  -JM      Base moist;purple;red  -JM      Periwound intact;ecchymotic;swelling  -      Periwound Temperature warm  -      Periwound Skin Turgor soft  -      Edges jagged;open  -JM      Wound Length (cm) 0.2 cm  -JM      Wound Width (cm) 0.3 cm  -JM      Wound Depth (cm) 0.1 cm  -JM      Wound Surface Area (cm^2) 0.06 cm^2  -JM      Wound Volume (cm^3) 0.006 cm^3  -JM      Drainage Characteristics/Odor sanguineous  -JM      Drainage Amount scant  -JM      Care, Wound cleansed with;wound cleanser;debrided  -JM      Dressing Care dressing applied;petroleum-based;gauze;foam;border dressing;multi-layer wrap  xeroform, 4\" optifoam, MLW  -JM      Periwound Care cleansed with pH balanced cleanser;dry periwound area maintained  -JM      Retired Wound - Properties Group Placement Date: 02/17/23  - Placement Time: 0800  - Side: Left  - Orientation: anterior  - Location: leg  -JM Primary Wound " "Type: Skin tear  -JM    Retired Wound - Properties Group Date first assessed: 02/17/23  - Time first assessed: 0800  -JM Side: Left  -JM Location: leg  -JM Primary Wound Type: Skin tear  -JM       Wound 01/28/23 1613 Left posterior heel Pressure Injury    Wound - Properties Group Placement Date: 01/28/23  -WR Placement Time: 1613 -WR Present on Hospital Admission: Y  -WR Side: Left  -WR Orientation: posterior  -WR Location: heel  -WR Primary Wound Type: Pressure inj  -WR    Wound Image Images linked: 1  -JM      Dressing Appearance intact;moist drainage  -JM      Base moist;necrotic;yellow;slough;pink  -      Periwound intact;blanchable;pink  -      Periwound Temperature warm  -      Periwound Skin Turgor soft  -      Edges open  -      Wound Length (cm) 0.8 cm  -      Wound Width (cm) 1.2 cm  -      Wound Depth (cm) --  obscured  -      Wound Surface Area (cm^2) 0.96 cm^2  -      Drainage Characteristics/Odor serosanguineous;yellow  -      Drainage Amount small  -JM      Care, Wound cleansed with;wound cleanser;debrided  -JM      Dressing Care dressing applied;collagen;antimicrobial agent applied;foam;border dressing  carolee, mepilex ag, 6\" optifoam  -      Periwound Care cleansed with pH balanced cleanser;dry periwound area maintained  -JM      Retired Wound - Properties Group Placement Date: 01/28/23  -WR Placement Time: 1613 -WR Present on Hospital Admission: Y  -WR Side: Left  -WR Orientation: posterior  -WR Location: heel  -WR Primary Wound Type: Pressure inj  -WR    Retired Wound - Properties Group Date first assessed: 01/28/23  -WR Time first assessed: 1613 -WR Present on Hospital Admission: Y  -WR Side: Left  -WR Location: heel  -WR Primary Wound Type: Pressure inj  -WR          User Key  (r) = Recorded By, (t) = Taken By, (c) = Cosigned By    Initials Name Provider Type    Shi Mane PT Physical Therapist    Aditi Shafer, RN Registered Nurse               " Lymphedema     Row Name 02/24/23 1300             Lymphedema Edema Assessment    Pitting Edema Moderate  -         Skin Changes/Observations    Location/Assessment Lower Extremity  -      Lower Extremity Conditions bilateral:;clean;dry;shiny;fragile  -      Lower Extremity Color/Pigment bilateral:;blanchable;hyperpigmented;purple  areas of ecchymosis  -         Compression/Skin Care    Compression/Skin Care skin care;wrapping location;bandaging  -      Skin Care washed/dried;lotion applied  -      Wrapping Location lower extremity  -      Wrapping Location LE bilateral:;foot to knee  -      Wrapping Comments size 4 compressogrips doubled distal 1/3 for gradient compression  -            User Key  (r) = Recorded By, (t) = Taken By, (c) = Cosigned By    Initials Name Provider Type    Shi Mane, PT Physical Therapist                WOUND DEBRIDEMENT  Total area of Debridement: 2cmsq  Debridement Site 1  Location- Site 1: L heel  Selective Debridement- Site 1: Wound Surface <20cmsq  Instruments- Site 1: tweezers, #15, scapel  Excised Tissue Description- Site 1: scant, slough  Bleeding- Site 1: none   Debridement Site 2  Location- Site 2: L shin skin tear  Selective Debridement- Site 2: Wound Surface <20cmsq  Instruments- Site 2: tweezers  Excised Tissue Description- Site 2: scant, slough  Bleeding- Site 2: none   Debridement Site 3  Location- Site 3: RLE skin tears  Selective Debridement- Site 3: Wound Surface <20cmsq  Instruments- Site 3: tweezers, scissors  Excised Tissue Description- Site 3: minimum, slough, other (comment) (rolled necrotic skin flap)  Bleeding- Site 3: seeping, held pressure, 2 minutes      Therapy Education     Row Name 02/24/23 1300             Therapy Education    Education Details Continue using xeroform and optifoam gentle for skin tears.  Add small piece of carolee to L heel wound prior to placing ag foam dressing.  Continue compresison and leg elevation.  Try to  offload L heel at night as much as possible, use offloading boots.  -VERONICA      Given Symptoms/condition management;Bandaging/dressing change  -      Program Reinforced;Modified  -VERONICA      How Provided Verbal;Demonstration  -      Provided to Patient;Caregiver  daughterLisa  -VERONICA      Level of Understanding Verbalized;Teach back education performed  -            User Key  (r) = Recorded By, (t) = Taken By, (c) = Cosigned By    Initials Name Provider Type    Shi Mane, PT Physical Therapist                Recommendation and Plan   PT Assessment/Plan     Row Name 02/24/23 1300          PT Assessment    Functional Limitations Performance in self-care ADL;Impaired gait;Other (comment)  wound management limitations  -     Impairments Integumentary integrity;Pain;Impaired sensory integrity  -     Assessment Comments Pt's LLE skin tear nearly resolved, but presents with two new skin tears to RLE s/p fall at home.  Skin flaps ecchymotic with questionable viability, debridement limited by pain and ease of bleeding this tx.  L heel wound unchanged, PT added carolee to promote metabolic balance and assist with wound healing.  Reinforced need for offloading.  Pt has offloading boots but does not use them at night due to difficulty repositioning herself when using them.  Continue current POC.  May consider MIST if not progressing with current tx.  -     Rehab Potential Fair  -     Patient/caregiver participated in establishment of treatment plan and goals Yes  -     Patient would benefit from skilled therapy intervention Yes  -        PT Plan    PT Frequency 1x/week  -VERONICA     Physical Therapy Interventions (Optional Details) patient/family education;wound care  -     PT Plan Comments debridement, MLW  -           User Key  (r) = Recorded By, (t) = Taken By, (c) = Cosigned By    Initials Name Provider Type    Shi Mane PT Physical Therapist                Goals   PT OP Goals     Row Name  02/24/23 1300          Time Calculation    PT Goal Re-Cert Due Date 05/18/23  -VERONICA           User Key  (r) = Recorded By, (t) = Taken By, (c) = Cosigned By    Initials Name Provider Type    Shi Mane, PT Physical Therapist                PT Goal Re-Cert Due Date: 05/18/23            Time Calculation: Start Time: 1300  Untimed Charges  94462-Llzflmpcmm comp below knee: 10  80053-Ykjsgddvd debridement: 25  Total Minutes  Untimed Charges Total Minutes: 35   Total Minutes: 35  Therapy Charges for Today     Code Description Service Date Service Provider Modifiers Qty    91572699875 HC PIPE DEBRIDE OPEN WOUND UP TO 20CM 2/24/2023 Shi Cordoba, PT GP 1    54284071547 HC PT MULTI LAYER COMP SYS BELOW KNEE 2/24/2023 Shi Cordoba, PT GP 1                  Shi Cordoba, PT  2/24/2023

## 2023-02-24 NOTE — TELEPHONE ENCOUNTER
[facility] called and stated they haven't received the order for the lantus, I got their fax incase you wanted to fax a copy or you can try to e-fax it. Their fax is [phone], please advise,

## 2023-02-24 NOTE — TELEPHONE ENCOUNTER
Called Farheen and informed her that Dr. Doherty is out on Maternity leave, explained should wouldn't be back until 3/6. Farheen states that she will resend papers for Dr. Martinez to sign for her.

## 2023-02-27 RX ORDER — BLOOD SUGAR DIAGNOSTIC
STRIP MISCELLANEOUS
Qty: 300 EACH | Refills: 4 | Status: SHIPPED | OUTPATIENT
Start: 2023-02-27 | End: 2023-02-27 | Stop reason: SDUPTHER

## 2023-02-27 RX ORDER — PEN NEEDLE, DIABETIC 30 GX5/16"
1 NEEDLE, DISPOSABLE MISCELLANEOUS DAILY
Qty: 300 EACH | Refills: 3 | Status: SHIPPED | OUTPATIENT
Start: 2023-02-27

## 2023-02-27 NOTE — TELEPHONE ENCOUNTER
Can you please send in pen needles and test strips for this patient. They are not active on patients chart.

## 2023-02-27 NOTE — TELEPHONE ENCOUNTER
Caller: [facility] - [place], [place] - [address] - [phone]  - [phone] FX    Relationship: Pharmacy    Best call back number: [phone]    Requested Prescriptions:   ACCU CHECK SMART VIEW TEST STRIPS COME IN BOXES OF 50 . 250 QUANTITY.    PEN NEEDLES FOR LANTUS DEVICE     Pharmacy where request should be sent:    [facility] - [place], [place] - [address] - [phone]  - [phone] FX     Additional details provided by patient: PATIENT DOES NOT HAVE ANY TEST STRIPS. PATIENT HAS BEEN WAITING FOR THE INSULIN SINCE 02.10.23.  LANTUS PEN INJECTOR WAS SENT IN BUT NO PEN NEEDLES OR TEST STRIPS WERE SENT IN. THE STRIPS WERE DONE BY A PREVIOUS PROVIDER. PHARMACY IS STRESSING THAT THIS IS URGENT.    Does the patient have less than a 3 day supply:  [x] Yes  [] No    Would you like a call back once the refill request has been completed: [] Yes [x] No    If the office needs to give you a call back, can they leave a voicemail: [] Yes [x] No    Kenyon Baker Rep   02/27/23 10:36 EST

## 2023-02-28 ENCOUNTER — OUTSIDE FACILITY SERVICE (OUTPATIENT)
Dept: INTERNAL MEDICINE | Facility: CLINIC | Age: 83
End: 2023-02-28
Payer: MEDICARE

## 2023-02-28 LAB
QT INTERVAL: 520 MS
QTC INTERVAL: 444 MS

## 2023-03-03 ENCOUNTER — HOSPITAL ENCOUNTER (OUTPATIENT)
Dept: PHYSICAL THERAPY | Facility: HOSPITAL | Age: 83
Setting detail: THERAPIES SERIES
Discharge: HOME | End: 2023-03-03
Payer: MEDICARE

## 2023-03-03 DIAGNOSIS — S81.811A ISTAP TYPE 2 SKIN TEAR OF RIGHT LOWER LEG: ICD-10-CM

## 2023-03-03 DIAGNOSIS — S91.302D OPEN WOUND OF LEFT HEEL, SUBSEQUENT ENCOUNTER: Primary | ICD-10-CM

## 2023-03-03 DIAGNOSIS — S81.812A ISTAP TYPE 2 SKIN TEAR OF LEFT LOWER LEG: ICD-10-CM

## 2023-03-03 DIAGNOSIS — R60.0 BILATERAL LOWER EXTREMITY EDEMA: ICD-10-CM

## 2023-03-03 DIAGNOSIS — S61.512A: ICD-10-CM

## 2023-03-03 PROCEDURE — 29581 APPL MULTLAYER CMPRN SYS LEG: CPT

## 2023-03-03 PROCEDURE — 97597 DBRDMT OPN WND 1ST 20 CM/<: CPT

## 2023-03-03 NOTE — THERAPY WOUND CARE TREATMENT
Outpatient Rehabilitation - Wound/Debridement Treatment Note  [facility]     Patient Name: Susan Anderson  : 1940  MRN: [id number]  Today's Date: 3/3/2023                 Admit Date: 3/3/2023    Visit Dx:    ICD-10-CM ICD-9-CM   1. Open wound of left heel, subsequent encounter  S91.302D V58.89     892.0   2. Bilateral lower extremity edema  R60.0 782.3   3. ISTAP type 2 skin tear of right lower leg  S81.811A 891.0   4. ISTAP type 2 skin tear of left lower leg  S81.812A 891.0   5. ISTAP type 2 skin tear of left wrist  S61.512A 881.02       Patient Active Problem List   Diagnosis   • Diabetic foot ulcer ([facility])   • PVD (peripheral vascular disease) ([facility])   • Osteomyelitis ([facility])   • Diabetes mellitus with neuropathy ([facility])   • Essential hypertension   • Stage 3a chronic kidney disease ([facility])   • Pyogenic inflammation of bone ([facility])   • Hyperglycemia   • Pneumonia due to infectious organism   • Mitral valve disease   • NICM (nonischemic cardiomyopathy) ([facility])   • CAD   • Dyslipidemia   • Chronic combined systolic and diastolic heart failure    • Lumbar stenosis with neurogenic claudication   • Spondylosis of lumbar region without myelopathy or radiculopathy   • Spondylolisthesis, lumbar region   • Degeneration of lumbar or lumbosacral intervertebral disc   • Gait disturbance   • Physical deconditioning   • Sarcopenia   • Weakness   • Anemia   • Fall   • Dizziness   • Demand ischemia ([facility])   • Effusion of right knee   • Right knee pain   • Pressure injury of skin of sacral region   • Sleep apnea   • GIB (gastrointestinal bleeding)   • Vasovagal syncope   • Hypomagnesemia   • Syncope, unspecified syncope type   • Paroxysmal atrial fibrillation   • Ulcer of esophagus without bleeding   • T2DM    • COVID-19   • Sepsis ([facility])        Past Medical History:   Diagnosis Date   • Anxiety    • Arthritis    • Asthma / - double pneumonia    Currently on inhaler and nebulizer   • Atrial fibrillation ([facility]) 2022   •  Cancer (HCC)     cervical cancer, skin cancer   • CHF (congestive heart failure) (HCC) 06/04/2021   • Chronic kidney disease Related to diabetes   • Coronary artery disease 6/4/2021 DX for hear failure   • Diabetes mellitus (HCC) 30 years    Seeing Dr. Lam 1st time Aug 19   • GERD (gastroesophageal reflux disease)    • Gout    • History of staph infection 10/2021    right toe   • History of transfusion     no reactions, 1 unit, [facility]   • Hx of colonoscopy    • Hyperlipidemia Reference current labs x 2-3yrs approx   • Hypertension 30 years   • Insomnia    • Migraine    • Mitral valve disease    • Mitral valve disease    • Osteomyelitis (HCC)    • Peripheral neuropathy    • Sleep apnea    • Type 2 diabetes mellitus (HCC)     30 years        Past Surgical History:   Procedure Laterality Date   • ABDOMINAL HYSTERECTOMY W/SALPINGECTOMY     • AMPUTATION  Right great toe, 1st 1/3 metatarsal -Reg 4/14/21   • AORTAGRAM N/A 04/09/2021    Procedure: AORTAGRAM WITH OR WITHOUT RUNOFFS, WITH Co2;  Surgeon: Trey Forrest MD;  Location:  [facility];  Service: Vascular;  Laterality: N/A;   • AORTAGRAM N/A 09/28/2022    Procedure: CO2 ANGIOGRAM, LEFT SFA ANGIOPLASTY WITH DRUG ELUTING BALLOON, LEFT SFA STENT PLACEMENT ;  Surgeon: Trey Forrest MD;  Location:  [facility];  Service: Vascular;  Laterality: N/A;  FLUORO: 13.12  DOSE 162mGy  CONTRAST: Isovue 350: 15ml   • APPENDECTOMY     • BRAIN TUMOR EXCISION      laser surgery    • CARDIAC CATHETERIZATION N/A 06/21/2021    Procedure: LEFT HEART CATH;  Surgeon: Anjum Ceballos MD;  Location:  [facility] CATH INVASIVE LOCATION;  Service: Cardiology;  Laterality: N/A;   • CATARACT EXTRACTION W/ INTRAOCULAR LENS  IMPLANT, BILATERAL     • CHOLECYSTECTOMY     • COLONOSCOPY     • ENDOSCOPY N/A 10/07/2022    Procedure: ESOPHAGOGASTRODUODENOSCOPY;  Surgeon: Stef Veras MD;  Location:  [facility];  Service: Gastroenterology;  Laterality: N/A;   • EYE SURGERY     • FEMORAL  ARTERY STENT  10/2022   • HYSTERECTOMY     • INTERVENTIONAL RADIOLOGY PROCEDURE N/A 1/15/2023    Procedure: CV INTERVENTIONAL RADIOLOGY PROCEDURE;  Surgeon: Sanket Zapata MD;  Location: [facility] CATH INVASIVE LOCATION;  Service: Interventional Radiology;  Laterality: N/A;   • TOE SURGERY     • TRANS METATARSAL AMPUTATION Right 04/14/2021    Procedure: AMPUTATION TRANS METATARSAL RIGHT GREAT TOE;  Surgeon: Valdez Finney MD;  Location:  [facility];  Service: Vascular;  Laterality: Right;         EVALUATION   PT Ortho     Row Name 03/03/23 1400       Subjective Comments    Subjective Comments Pt still with notable pain to the L heel. Pt with NEW skin tear to the posterior L wrist s/p colliding with grommeting closures on her couch.  -MC       Subjective Pain    Able to rate subjective pain? yes  -MC    Pre-Treatment Pain Level 6  -MC    Post-Treatment Pain Level 6  -MC    Subjective Pain Comment L heel, FACES  -       Transfers    Comment, (Transfers) seated in electric w/c for tx  -          User Key  (r) = Recorded By, (t) = Taken By, (c) = Cosigned By    Initials Name Provider Type    Lucila Mohan PT Physical Therapist                 [facility] Wound     Row Name 03/03/23 1400             Wound 03/03/23 1300 Left posterior wrist Skin Tear    Wound - Properties Group Placement Date: 03/03/23  - Placement Time: 1300  - Present on Hospital Admission: N  - Side: Left  - Orientation: posterior  - Location: wrist  - Primary Wound Type: Skin tear  -    Dressing Appearance intact;moist drainage  -      Base moist;pink;red;purple;other (see comments)  partially necrotic skin flap, thin area of exposed tissue  -      Periwound intact;dry;pink;redness  -      Periwound Temperature warm  -      Periwound Skin Turgor soft  -      Edges irregular;open;jagged  -      Drainage Characteristics/Odor sanguineous  -      Drainage Amount scant  -      Care, Wound cleansed with;wound cleanser  -    "   Dressing Care dressing applied;petroleum-based;gauze;low-adherent;foam  xeroform, 4\" optifoam  -      Periwound Care cleansed with pH balanced cleanser;dry periwound area maintained  -      Retired Wound - Properties Group Placement Date: 03/03/23  - Placement Time: 1300  - Present on Hospital Admission: N  - Side: Left  - Orientation: posterior  - Location: wrist  - Primary Wound Type: Skin tear  -MC    Retired Wound - Properties Group Date first assessed: 03/03/23  - Time first assessed: 1300  - Present on Hospital Admission: N  - Side: Left  - Location: wrist  - Primary Wound Type: Skin tear  -MC       Wound 02/24/23 1300 Right proximal leg Skin Tear    Wound - Properties Group Placement Date: 02/24/23  - Placement Time: 1300  - Side: Right  - Orientation: proximal  - Location: leg  - Primary Wound Type: Skin tear  -JM    Dressing Appearance intact;moist drainage  -      Base moist;pink;red;yellow;subcutaneous;slough;other (see comments)  min residual skin flap prox/medial  -      Periwound ecchymotic;edematous;redness;swelling  -      Periwound Temperature warm  -      Periwound Skin Turgor soft  -      Edges jagged;open  -      Drainage Characteristics/Odor sanguineous  -      Drainage Amount small  -      Care, Wound cleansed with;wound cleanser;debrided  -      Dressing Care dressing applied;petroleum-based;gauze;low-adherent;foam;multi-layer wrap  xeroform, 6\" optifoam, MLW  -      Periwound Care cleansed with pH balanced cleanser;dry periwound area maintained  -      Retired Wound - Properties Group Placement Date: 02/24/23  - Placement Time: 1300  - Side: Right  - Orientation: proximal  - Location: leg  -JM Primary Wound Type: Skin tear  -JM    Retired Wound - Properties Group Date first assessed: 02/24/23  [facility] Time first assessed: 1300  - Side: Right  - Location: leg  - Primary Wound Type: Skin tear  -JM       Wound 02/17/23 0800 " "Left anterior leg Skin Tear    Wound - Properties Group Placement Date: 02/17/23  - Placement Time: 0800  - Side: Left  - Orientation: anterior  - Location: leg  -JM Primary Wound Type: Skin tear  -JM    Dressing Appearance intact;moist drainage  -MC      Base moist;red;pink  -MC      Periwound intact;ecchymotic;swelling  -MC      Periwound Temperature warm  -MC      Periwound Skin Turgor soft  -MC      Edges jagged;open  -MC      Drainage Characteristics/Odor sanguineous  -MC      Drainage Amount scant  -MC      Care, Wound cleansed with;wound cleanser;debrided  -MC      Dressing Care dressing applied;petroleum-based;gauze;low-adherent;foam;multi-layer wrap  xeroform, 4\" optifoam, MLW  -MC      Periwound Care cleansed with pH balanced cleanser;dry periwound area maintained  -      Retired Wound - Properties Group Placement Date: 02/17/23  - Placement Time: 0800  - Side: Left  - Orientation: anterior  - Location: leg  -JM Primary Wound Type: Skin tear  -JM    Retired Wound - Properties Group Date first assessed: 02/17/23  - Time first assessed: 0800  - Side: Left  - Location: leg  - Primary Wound Type: Skin tear  -JM       Wound 01/28/23 1613 Left posterior heel Pressure Injury    Wound - Properties Group Placement Date: 01/28/23  - Placement Time: 1613  -WR Present on Hospital Admission: Y  -WR Side: Left  -WR Orientation: posterior  -WR Location: heel  -WR Primary Wound Type: Pressure inj  -WR    Dressing Appearance intact;moist drainage  -MC      Base moist;necrotic;yellow;slough;pink;dry  centrally dry, fibrous today  -MC      Periwound intact;blanchable;pink  -MC      Periwound Temperature warm  -MC      Periwound Skin Turgor soft  -MC      Edges open  -MC      Drainage Characteristics/Odor serosanguineous;yellow  -MC      Drainage Amount scant  -MC      Care, Wound cleansed with;wound cleanser;debrided  -MC      Dressing Care dressing " "applied;petroleum-based;gauze;low-adherent;foam;multi-layer wrap  xeroform, 6\" optifoam, MLW  -      Periwound Care cleansed with pH balanced cleanser;dry periwound area maintained  -      Retired Wound - Properties Group Placement Date: 01/28/23  -WR Placement Time: 1613  -WR Present on Hospital Admission: Y  -WR Side: Left  -WR Orientation: posterior  -WR Location: heel  -WR Primary Wound Type: Pressure inj  -WR    Retired Wound - Properties Group Date first assessed: 01/28/23  -WR Time first assessed: 1613 -WR Present on Hospital Admission: Y  -WR Side: Left  -WR Location: heel  -WR Primary Wound Type: Pressure inj  -WR          User Key  (r) = Recorded By, (t) = Taken By, (c) = Cosigned By    Initials Name Provider Type    Lucila Mohan, PT Physical Therapist    Shi Mane, PT Physical Therapist    Aditi Shafer RN Registered Nurse               Lymphedema     Row Name 03/03/23 1400             Lymphedema Edema Assessment    Pitting Edema Moderate  -         Skin Changes/Observations    Location/Assessment Lower Extremity  -      Lower Extremity Conditions bilateral:;clean;dry;shiny;fragile  -      Lower Extremity Color/Pigment bilateral:;blanchable;hyperpigmented;purple  areas of ecchymosis  -         Compression/Skin Care    Compression/Skin Care skin care;wrapping location;bandaging  -      Skin Care washed/dried;lotion applied  -      Wrapping Location lower extremity  -      Wrapping Location LE bilateral:;foot to knee  -      Wrapping Comments size 4 compressogrips doubled distal 1/3 for gradient compression  -            User Key  (r) = Recorded By, (t) = Taken By, (c) = Cosigned By    Initials Name Provider Type    Lucila Mohan, PT Physical Therapist                WOUND DEBRIDEMENT  Total area of Debridement: 8 cm2  Debridement Site 1  Location- Site 1: L heel  Selective Debridement- Site 1: Wound Surface <20cmsq  Instruments- Site 1: " tweezers  Excised Tissue Description- Site 1: scant, slough, necrotic  Bleeding- Site 1: none   Debridement Site 2  Location- Site 2: L shin skin tear  Selective Debridement- Site 2: Wound Surface <20cmsq  Instruments- Site 2: tweezers, #15, scapel  Excised Tissue Description- Site 2: moderate, slough, necrotic (skin flap)   Debridement Site 3  Location- Site 3: RLE skin tears  Selective Debridement- Site 3: Wound Surface <20cmsq  Instruments- Site 3: tweezers, #15, scapel  Excised Tissue Description- Site 3: maximum, slough, other (comment) (necrotic skin flap)  Bleeding- Site 3: seeping, held pressure, 1 minute      Therapy Education     Row Name 03/03/23 1400             Therapy Education    Education Details Change L heel dressing to xeroform to attempt to address dry wound bed. Continue POC for lower leg wounds/edema. Add xeroform and optifoam to L wrist.  -MC      Given Symptoms/condition management;Bandaging/dressing change  -      Program Reinforced;Modified  -      How Provided Verbal;Demonstration  -MC      Provided to Patient;Caregiver  -      Level of Understanding Verbalized;Teach back education performed  -            User Key  (r) = Recorded By, (t) = Taken By, (c) = Cosigned By    Initials Name Provider Type    Lucila Mohan, PT Physical Therapist                Recommendation and Plan   PT Assessment/Plan     Row Name 03/03/23 1400          PT Assessment    Functional Limitations Performance in self-care ADL;Impaired gait;Other (comment)  wound management limitations  -     Impairments Integumentary integrity;Pain;Impaired sensory integrity  -     Assessment Comments PT able to debride additional necrotic skin flap and slough from the BLE skin tears today, revealing mostly viable, pink/red, moist tissue underneath. The L heel wound was slightly dry today, so PT added xeroform to the area. Pt with NEW skin tear to the posterior L wrist, addressed with xeroform today. Pt will  continue to benefit from skilled PT wound care to promote healing.  -     Rehab Potential Fair  -     Patient/caregiver participated in establishment of treatment plan and goals Yes  -     Patient would benefit from skilled therapy intervention Yes  -        PT Plan    PT Frequency 2x/week;1x/week  -     Physical Therapy Interventions (Optional Details) wound care;patient/family education  -     PT Plan Comments debridement, MLW  -           User Key  (r) = Recorded By, (t) = Taken By, (c) = Cosigned By    Initials Name Provider Type    Lucila Mohan, PT Physical Therapist                Goals   PT OP Goals     Row Name 03/03/23 1458          Time Calculation    PT Goal Re-Cert Due Date 05/18/23  -           User Key  (r) = Recorded By, (t) = Taken By, (c) = Cosigned By    Initials Name Provider Type    Lucila Mohan, PT Physical Therapist                PT Goal Re-Cert Due Date: 05/18/23            Time Calculation: Start Time: 1300  Untimed Charges  99749-Uvigyiwegs comp below knee: 10  85604-Tpdxkeoat debridement: 15  Total Minutes  Untimed Charges Total Minutes: 25   Total Minutes: 25  Therapy Charges for Today     Code Description Service Date Service Provider Modifiers Qty    [id number] HC PT MULTI LAYER COMP SYS BELOW KNEE 3/3/2023 Lucila Ulrich, PT GP 1    [id number] HC PIPE DEBRIDE OPEN WOUND UP TO 20CM 3/3/2023 Lucila Ulrich, PT GP 1                  Lucila Ulrich, PT  3/3/2023

## 2023-03-10 ENCOUNTER — HOSPITAL ENCOUNTER (OUTPATIENT)
Dept: PHYSICAL THERAPY | Facility: HOSPITAL | Age: 83
Setting detail: THERAPIES SERIES
Discharge: HOME | End: 2023-03-10
Payer: MEDICARE

## 2023-03-10 DIAGNOSIS — R60.0 BILATERAL LOWER EXTREMITY EDEMA: ICD-10-CM

## 2023-03-10 DIAGNOSIS — S91.302D OPEN WOUND OF LEFT HEEL, SUBSEQUENT ENCOUNTER: Primary | ICD-10-CM

## 2023-03-10 DIAGNOSIS — S81.811A ISTAP TYPE 2 SKIN TEAR OF RIGHT LOWER LEG: ICD-10-CM

## 2023-03-10 DIAGNOSIS — S61.512A: ICD-10-CM

## 2023-03-10 PROCEDURE — 97597 DBRDMT OPN WND 1ST 20 CM/<: CPT

## 2023-03-10 PROCEDURE — 29581 APPL MULTLAYER CMPRN SYS LEG: CPT

## 2023-03-10 NOTE — THERAPY WOUND CARE TREATMENT
Outpatient Rehabilitation - Wound/Debridement Treatment Note  [facility]     Patient Name: Susan Anderson  : 1940  MRN: [id number]  Today's Date: 3/10/2023                 Admit Date: 3/10/2023    Visit Dx:    ICD-10-CM ICD-9-CM   1. Open wound of left heel, subsequent encounter  S91.302D V58.89     892.0   2. Bilateral lower extremity edema  R60.0 782.3   3. ISTAP type 2 skin tear of right lower leg  S81.811A 891.0   4. ISTAP type 2 skin tear of left wrist  S61.512A 881.02   L forearm skin tear:    L upper arm skin tear:    R knee/leg skin tears:    L heel ulcer:      Patient Active Problem List   Diagnosis   • Diabetic foot ulcer ([facility])   • PVD (peripheral vascular disease) ([facility])   • Osteomyelitis ([facility])   • Diabetes mellitus with neuropathy ([facility])   • Essential hypertension   • Stage 3a chronic kidney disease ([facility])   • Pyogenic inflammation of bone ([facility])   • Hyperglycemia   • Pneumonia due to infectious organism   • Mitral valve disease   • NICM (nonischemic cardiomyopathy) ([facility])   • CAD   • Dyslipidemia   • Chronic combined systolic and diastolic heart failure    • Lumbar stenosis with neurogenic claudication   • Spondylosis of lumbar region without myelopathy or radiculopathy   • Spondylolisthesis, lumbar region   • Degeneration of lumbar or lumbosacral intervertebral disc   • Gait disturbance   • Physical deconditioning   • Sarcopenia   • Weakness   • Anemia   • Fall   • Dizziness   • Demand ischemia ([facility])   • Effusion of right knee   • Right knee pain   • Pressure injury of skin of sacral region   • Sleep apnea   • GIB (gastrointestinal bleeding)   • Vasovagal syncope   • Hypomagnesemia   • Syncope, unspecified syncope type   • Paroxysmal atrial fibrillation   • Ulcer of esophagus without bleeding   • T2DM    • COVID-19   • Sepsis ([facility])        Past Medical History:   Diagnosis Date   • Anxiety    • Arthritis    • Asthma 6/ - double pneumonia    Currently on inhaler and nebulizer   • Atrial  fibrillation (HCC) 08/21/2022   • Cancer (HCC)     cervical cancer, skin cancer   • CHF (congestive heart failure) (HCC) 06/04/2021   • Chronic kidney disease Related to diabetes   • Coronary artery disease 6/4/2021 DX for hear failure   • Diabetes mellitus (HCC) 30 years    Seeing Dr. Lam 1st time Aug 19   • GERD (gastroesophageal reflux disease)    • Gout    • History of staph infection 10/2021    right toe   • History of transfusion     no reactions, 1 unit, [facility]   • Hx of colonoscopy    • Hyperlipidemia Reference current labs x 2-3yrs approx   • Hypertension 30 years   • Insomnia    • Migraine    • Mitral valve disease    • Mitral valve disease    • Osteomyelitis (HCC)    • Peripheral neuropathy    • Sleep apnea    • Type 2 diabetes mellitus (HCC)     30 years        Past Surgical History:   Procedure Laterality Date   • ABDOMINAL HYSTERECTOMY W/SALPINGECTOMY     • AMPUTATION  Right great toe, 1st 1/3 metatarsal -[place] 4/14/21   • AORTAGRAM N/A 04/09/2021    Procedure: AORTAGRAM WITH OR WITHOUT RUNOFFS, WITH Co2;  Surgeon: Trey Forrest MD;  Location:  [facility];  Service: Vascular;  Laterality: N/A;   • AORTAGRAM N/A 09/28/2022    Procedure: CO2 ANGIOGRAM, LEFT SFA ANGIOPLASTY WITH DRUG ELUTING BALLOON, LEFT SFA STENT PLACEMENT ;  Surgeon: Trey Forrest MD;  Location:  [facility];  Service: Vascular;  Laterality: N/A;  FLUORO: 13.12  DOSE 162mGy  CONTRAST: Isovue 350: 15ml   • APPENDECTOMY     • BRAIN TUMOR EXCISION      laser surgery    • CARDIAC CATHETERIZATION N/A 06/21/2021    Procedure: LEFT HEART CATH;  Surgeon: Anjum Ceballos MD;  Location:  [facility] CATH INVASIVE LOCATION;  Service: Cardiology;  Laterality: N/A;   • CATARACT EXTRACTION W/ INTRAOCULAR LENS  IMPLANT, BILATERAL     • CHOLECYSTECTOMY     • COLONOSCOPY     • ENDOSCOPY N/A 10/07/2022    Procedure: ESOPHAGOGASTRODUODENOSCOPY;  Surgeon: Stef Veras MD;  Location:  [facility];  Service: Gastroenterology;  Laterality: N/A;  "  • EYE SURGERY     • FEMORAL ARTERY STENT  10/2022   • HYSTERECTOMY     • INTERVENTIONAL RADIOLOGY PROCEDURE N/A 1/15/2023    Procedure: CV INTERVENTIONAL RADIOLOGY PROCEDURE;  Surgeon: Sanket Zapata MD;  Location: [facility] CATH INVASIVE LOCATION;  Service: Interventional Radiology;  Laterality: N/A;   • TOE SURGERY     • TRANS METATARSAL AMPUTATION Right 04/14/2021    Procedure: AMPUTATION TRANS METATARSAL RIGHT GREAT TOE;  Surgeon: Valdez Finney MD;  Location:  [facility];  Service: Vascular;  Laterality: Right;         EVALUATION   PT Ortho     Row Name 03/10/23 1300       Subjective Comments    Subjective Comments No new complaints, daughter took compression off last night, did not replace this AM due to coming for tx.  -       Subjective Pain    Able to rate subjective pain? yes  -    Pre-Treatment Pain Level 5  -JM    Post-Treatment Pain Level 5  -    Subjective Pain Comment L heel, R knee wounds  -       Transfers    Comment, (Transfers) seated in electric w/c for tx  -JM          User Key  (r) = Recorded By, (t) = Taken By, (c) = Cosigned By    Initials Name Provider Type    Shi Mane, PT Physical Therapist                 [facility] Wound     Row Name 03/10/23 1300             Wound 03/10/23 1300 Left upper arm Skin Tear    Wound - Properties Group Placement Date: 03/10/23  - Placement Time: 1300  -JM Side: Left  - Orientation: upper  - Location: arm  - Primary Wound Type: Skin tear  -JM    Wound Image Images linked: 1  -JM      Dressing Appearance intact;moist drainage  xeroform, 4\" optifoam  -      Base moist;pink;red  -      Periwound intact;ecchymotic  -      Periwound Temperature warm  -      Periwound Skin Turgor soft  -      Edges open;irregular  -JM      Wound Length (cm) 0.7 cm  -JM      Wound Width (cm) 1.2 cm  -JM      Wound Depth (cm) 0.1 cm  -JM      Wound Surface Area (cm^2) 0.84 cm^2  -JM      Wound Volume (cm^3) 0.084 cm^3  -JM      Drainage " "Characteristics/Odor serosanguineous  -      Drainage Amount scant  -      Care, Wound cleansed with;wound cleanser;debrided  -JM      Dressing Care dressing applied;petroleum-based;gauze;foam;border dressing  xeroform, 4\" optifoam  -JM      Periwound Care cleansed with pH balanced cleanser;dry periwound area maintained  -      Retired Wound - Properties Group Placement Date: 03/10/23  [facility] Placement Time: 1300  - Side: Left  - Orientation: upper  - Location: arm  -JM Primary Wound Type: Skin tear  -JM    Retired Wound - Properties Group Date first assessed: 03/10/23  - Time first assessed: 1300  - Side: Left  - Location: arm  - Primary Wound Type: Skin tear  -JM       Wound 03/03/23 1300 Left posterior wrist Skin Tear    Wound - Properties Group Placement Date: 03/03/23  - Placement Time: 1300  - Present on Hospital Admission: N  - Side: Left  - Orientation: posterior  - Location: wrist  - Primary Wound Type: Skin tear  -MC    Wound Image Images linked: 1  -      Dressing Appearance intact;moist drainage  -      Base moist;pink;red;purple;other (see comments)  rolled ecchymotic skin flap  -      Periwound intact;dry;pink;redness;ecchymotic  -      Periwound Temperature warm  -      Periwound Skin Turgor soft  -      Edges irregular;open;jagged  -      Wound Length (cm) 0.6 cm  -      Wound Width (cm) 0.4 cm  -      Wound Depth (cm) 0.1 cm  -      Wound Surface Area (cm^2) 0.24 cm^2  -      Wound Volume (cm^3) 0.024 cm^3  -JM      Drainage Characteristics/Odor sanguineous  -      Drainage Amount scant  -      Care, Wound cleansed with;wound cleanser;debrided  -JM      Dressing Care dressing applied;petroleum-based;gauze;foam;border dressing  xeroform, 4\" optifoam  -JM      Periwound Care cleansed with pH balanced cleanser;dry periwound area maintained  -      Retired Wound - Properties Group Placement Date: 03/03/23  [facility] Placement Time: 1300  - Present on " "Hospital Admission: N  - Side: Left  - Orientation: posterior  - Location: wrist  - Primary Wound Type: Skin tear  -MC    Retired Wound - Properties Group Date first assessed: 03/03/23  - Time first assessed: 1300  - Present on Hospital Admission: N  - Side: Left  - Location: wrist  - Primary Wound Type: Skin tear  -MC       Wound 02/24/23 1300 Right proximal leg Skin Tear    Wound - Properties Group Placement Date: 02/24/23  - Placement Time: 1300  - Side: Right  - Orientation: proximal  - Location: leg  -JM Primary Wound Type: Skin tear  -JM    Wound Image Images linked: 1  -JM      Dressing Appearance intact;moist drainage  -      Base moist;pink;red;yellow;subcutaneous;slough;granulating  prox area clean/granulating  -      Periwound ecchymotic;edematous;redness;swelling;pale white;macerated  mod maceration distally  -      Periwound Temperature warm  -      Periwound Skin Turgor soft  -      Edges jagged;open  -      Wound Length (cm) 3.3 cm  prox; distally 0.8cm x1.0cm x0.1cm  -      Wound Width (cm) 1.9 cm  -      Wound Depth (cm) 0.1 cm  -      Wound Surface Area (cm^2) 6.27 cm^2  -      Wound Volume (cm^3) 0.627 cm^3  -JM      Drainage Characteristics/Odor sanguineous  -      Drainage Amount small  -      Care, Wound cleansed with;wound cleanser;debrided  -      Dressing Care dressing applied;silver impregnated;foam;border dressing;multi-layer wrap  mepilex ag, 6\" optifoam, MLW  -      Periwound Care cleansed with pH balanced cleanser;dry periwound area maintained  -      Retired Wound - Properties Group Placement Date: 02/24/23  - Placement Time: 1300  -JM Side: Right  - Orientation: proximal  - Location: leg  -JM Primary Wound Type: Skin tear  -JM    Retired Wound - Properties Group Date first assessed: 02/24/23  - Time first assessed: 1300  -JM Side: Right  - Location: leg  - Primary Wound Type: Skin tear  -JM       Wound 02/17/23 0800 " "Left anterior leg Skin Tear    Wound - Properties Group Placement Date: 02/17/23  - Placement Time: 0800  -JM Side: Left  -JM Orientation: anterior  -JM Location: leg  -JM Primary Wound Type: Skin tear  -JM    Dressing Appearance no drainage;open to air  -JM      Base dry;pink;scab  -JM      Periwound intact;swelling  -      Periwound Temperature warm  -      Periwound Skin Turgor soft  -JM      Drainage Amount none  -JM      Care, Wound cleansed with;soap and water  -JM      Dressing Care multi-layer wrap  -JM      Retired Wound - Properties Group Placement Date: 02/17/23  - Placement Time: 0800  -JM Side: Left  -JM Orientation: anterior  -JM Location: leg  -JM Primary Wound Type: Skin tear  -JM    Retired Wound - Properties Group Date first assessed: 02/17/23  - Time first assessed: 0800  -JM Side: Left  -JM Location: leg  -JM Primary Wound Type: Skin tear  -JM       Wound 01/28/23 1613 Left posterior heel Pressure Injury    Wound - Properties Group Placement Date: 01/28/23  -WR Placement Time: 1613  -WR Present on Hospital Admission: Y  -WR Side: Left  -WR Orientation: posterior  -WR Location: heel  -WR Primary Wound Type: Pressure inj  -WR    Wound Image Images linked: 1  -JM      Dressing Appearance intact;moist drainage  -JM      Base moist;necrotic;yellow;slough;pink  moist fibrous slough  -      Periwound intact;blanchable;pink;macerated  min maceration  -      Periwound Temperature warm  -      Periwound Skin Turgor soft  -JM      Edges open  -      Wound Length (cm) 1.1 cm  -      Wound Width (cm) 1.4 cm  -      Wound Depth (cm) --  obscured  -      Wound Surface Area (cm^2) 1.54 cm^2  -JM      Drainage Characteristics/Odor serosanguineous;yellow  -JM      Drainage Amount small  -JM      Care, Wound cleansed with;wound cleanser;debrided  -JM      Dressing Care dressing applied;antimicrobial agent applied;foam;border dressing  HFBr, 4\" optifoam, MLW  -JM      Periwound Care cleansed " with pH balanced cleanser;dry periwound area maintained  -      Retired Wound - Properties Group Placement Date: 01/28/23  -WR Placement Time: 1613  -WR Present on Hospital Admission: Y  -WR Side: Left  -WR Orientation: posterior  -WR Location: heel  -WR Primary Wound Type: Pressure inj  -WR    Retired Wound - Properties Group Date first assessed: 01/28/23  -WR Time first assessed: 1613  -WR Present on Hospital Admission: Y  -WR Side: Left  -WR Location: heel  -WR Primary Wound Type: Pressure inj  -WR          User Key  (r) = Recorded By, (t) = Taken By, (c) = Cosigned By    Initials Name Provider Type    Lucila Mohan, PT Physical Therapist    Shi Mane, PT Physical Therapist    Aditi Shafer, RN Registered Nurse               Lymphedema     Row Name 03/10/23 1300             Lymphedema Edema Assessment    Pitting Edema Moderate  -         Skin Changes/Observations    Location/Assessment Lower Extremity  -      Lower Extremity Conditions bilateral:;clean;dry;shiny;fragile  -      Lower Extremity Color/Pigment bilateral:;blanchable;hyperpigmented;purple  areas of ecchymosis  -         Compression/Skin Care    Compression/Skin Care skin care;wrapping location;bandaging  -      Skin Care washed/dried;lotion applied  -      Wrapping Location lower extremity  -      Wrapping Location LE bilateral:;foot to knee  -      Wrapping Comments size 4 compressogrips doubled distal 1/2 for gradient compression  -            User Key  (r) = Recorded By, (t) = Taken By, (c) = Cosigned By    Initials Name Provider Type    Shi Mane, PT Physical Therapist                WOUND DEBRIDEMENT  Total area of Debridement: 8cmsq  Debridement Site 1  Location- Site 1: L heel  Selective Debridement- Site 1: Wound Surface <20cmsq  Instruments- Site 1: tweezers, #15, scapel  Excised Tissue Description- Site 1: minimum, slough, other (comment) (fibrous slough)  Bleeding- Site 1: none    Debridement Site 2  Location- Site 2: LUE skin tears  Selective Debridement- Site 2: Wound Surface <20cmsq  Instruments- Site 2: tweezers, scissors  Excised Tissue Description- Site 2: minimum, slough, other (comment) (necrotic skin flap)  Bleeding- Site 2: scant, held pressure, 1 minute   Debridement Site 3  Location- Site 3: RLE skin tears  Selective Debridement- Site 3: Wound Surface <20cmsq  Instruments- Site 3: tweezers, #15, scapel  Excised Tissue Description- Site 3: minimum, slough  Bleeding- Site 3: scant      Therapy Education     Row Name 03/10/23 1300             Therapy Education    Education Details Continue xeroform to LUE skin tears.  Use mepilex ag on R knee skin tears due to maceration.  Use HFB to L heel.  Continue with daily compression use and leg elevation.  Monitor daily activities for potential causes for pressure to L heel that may be limiting wound healing.  -VERONICA      Given Symptoms/condition management;Bandaging/dressing change  -      Program Reinforced;Modified  -VERONICA      How Provided Verbal;Demonstration  -VERONICA      Provided to Patient;Caregiver  -VERONICA      Level of Understanding Verbalized;Teach back education performed  -            User Key  (r) = Recorded By, (t) = Taken By, (c) = Cosigned By    Initials Name Provider Type    Shi Mane, PT Physical Therapist                Recommendation and Plan   PT Assessment/Plan     Row Name 03/10/23 1300          PT Assessment    Functional Limitations Performance in self-care ADL;Impaired gait;Other (comment)  wound management limitations  -     Impairments Integumentary integrity;Pain;Impaired sensory integrity  -     Assessment Comments LLE skin tear appears resolved.  R knee skin tears improving with increased granulation, though with maceration today so PT switched to mepilex ag.  L heel still necrotic, so changed to HFBr to possibly assist with debridement.  Pt with small skin tears to LUE, PT able to debride necrotic  skin flaps.  Pt continues to have moderate pitting edema of LLE, needs reinforcement for consistent compression and leg elevation.  Continue current POC.  May consider checking if pt has had arterial studies if L heel wound not progressing.  -        PT Plan    PT Frequency 1x/week;2x/week  -VERONICA     Physical Therapy Interventions (Optional Details) patient/family education;wound care  -     PT Plan Comments debridement, MLW  -           User Key  (r) = Recorded By, (t) = Taken By, (c) = Cosigned By    Initials Name Provider Type    Shi Mane, PT Physical Therapist                Goals   PT OP Goals     Row Name 03/10/23 1300          Time Calculation    PT Goal Re-Cert Due Date 05/18/23  -           User Key  (r) = Recorded By, (t) = Taken By, (c) = Cosigned By    Initials Name Provider Type    Shi Mane, PT Physical Therapist                PT Goal Re-Cert Due Date: 05/18/23            Time Calculation: Untimed Charges  74680-Uafncxvkki comp below knee: 10  87386-Ybvnlvkrn debridement: 20  Total Minutes  Untimed Charges Total Minutes: 30   Total Minutes: 30  Therapy Charges for Today     Code Description Service Date Service Provider Modifiers Qty    80670710244 HC PIPE DEBRIDE OPEN WOUND UP TO 20CM 3/10/2023 Shi Cordoba, PT GP 1    [id number] HC PT MULTI LAYER COMP SYS BELOW KNEE 3/10/2023 Shi Cordoba, PT GP 1                  Shi Cordoba, PT  3/10/2023

## 2023-03-15 ENCOUNTER — HOSPITAL ENCOUNTER (OUTPATIENT)
Dept: PHYSICAL THERAPY | Facility: HOSPITAL | Age: 83
Setting detail: THERAPIES SERIES
Discharge: HOME | End: 2023-03-15
Payer: MEDICARE

## 2023-03-15 DIAGNOSIS — S81.811A ISTAP TYPE 2 SKIN TEAR OF RIGHT LOWER LEG: ICD-10-CM

## 2023-03-15 DIAGNOSIS — R60.0 BILATERAL LOWER EXTREMITY EDEMA: ICD-10-CM

## 2023-03-15 DIAGNOSIS — S61.512A: ICD-10-CM

## 2023-03-15 DIAGNOSIS — S91.302D OPEN WOUND OF LEFT HEEL, SUBSEQUENT ENCOUNTER: Primary | ICD-10-CM

## 2023-03-15 DIAGNOSIS — S81.812A ISTAP TYPE 2 SKIN TEAR OF LEFT LOWER LEG: ICD-10-CM

## 2023-03-15 DIAGNOSIS — S91.302D OPEN WOUND OF LEFT FOOT, SUBSEQUENT ENCOUNTER: ICD-10-CM

## 2023-03-15 PROCEDURE — 97597 DBRDMT OPN WND 1ST 20 CM/<: CPT

## 2023-03-15 PROCEDURE — 29581 APPL MULTLAYER CMPRN SYS LEG: CPT

## 2023-03-15 NOTE — THERAPY PROGRESS REPORT/RE-CERT
Outpatient Rehabilitation - Wound/Debridement Progress Note  [facility]     Patient Name: Susan Anderson  : 1940  MRN: [id number]  Today's Date: 3/15/2023             L upper arm      L posterior wrist      L foot (NEW)      L heel      L prox/lateral leg (NEW)      R prox/med leg      Admit Date: 3/15/2023    Visit Dx:    ICD-10-CM ICD-9-CM   1. Open wound of left heel, subsequent encounter  S91.302D V58.89     892.0   2. Bilateral lower extremity edema  R60.0 782.3   3. ISTAP type 2 skin tear of right lower leg  S81.811A 891.0   4. ISTAP type 2 skin tear of left wrist  S61.512A 881.02   5. ISTAP type 2 skin tear of left lower leg  S81.812A 891.0   6. Open wound of left foot, subsequent encounter  S91.302D V58.89     892.0       Patient Active Problem List   Diagnosis   • Diabetic foot ulcer ([facility])   • PVD (peripheral vascular disease) ([facility])   • Osteomyelitis ([facility])   • Diabetes mellitus with neuropathy ([facility])   • Essential hypertension   • Stage 3a chronic kidney disease ([facility])   • Pyogenic inflammation of bone ([facility])   • Hyperglycemia   • Pneumonia due to infectious organism   • Mitral valve disease   • NICM (nonischemic cardiomyopathy) ([facility])   • CAD   • Dyslipidemia   • Chronic combined systolic and diastolic heart failure    • Lumbar stenosis with neurogenic claudication   • Spondylosis of lumbar region without myelopathy or radiculopathy   • Spondylolisthesis, lumbar region   • Degeneration of lumbar or lumbosacral intervertebral disc   • Gait disturbance   • Physical deconditioning   • Sarcopenia   • Weakness   • Anemia   • Fall   • Dizziness   • Demand ischemia ([facility])   • Effusion of right knee   • Right knee pain   • Pressure injury of skin of sacral region   • Sleep apnea   • GIB (gastrointestinal bleeding)   • Vasovagal syncope   • Hypomagnesemia   • Syncope, unspecified syncope type   • Paroxysmal atrial fibrillation   • Ulcer of esophagus without bleeding   • T2DM    • COVID-19   • Sepsis ([facility])         Past Medical History:   Diagnosis Date   • Anxiety    • Arthritis    • Asthma 6/4 - double pneumonia    Currently on inhaler and nebulizer   • Atrial fibrillation (HCC) 08/21/2022   • Cancer (HCC)     cervical cancer, skin cancer   • CHF (congestive heart failure) (HCC) 06/04/2021   • Chronic kidney disease Related to diabetes   • Coronary artery disease 6/4/2021 DX for hear failure   • Diabetes mellitus (HCC) 30 years    Seeing Dr. Lam 1st time Aug 19   • GERD (gastroesophageal reflux disease)    • Gout    • History of staph infection 10/2021    right toe   • History of transfusion     no reactions, 1 unit, [facility]   • Hx of colonoscopy    • Hyperlipidemia Reference current labs x 2-3yrs approx   • Hypertension 30 years   • Insomnia    • Migraine    • Mitral valve disease    • Mitral valve disease    • Osteomyelitis (HCC)    • Peripheral neuropathy    • Sleep apnea    • Type 2 diabetes mellitus (HCC)     30 years        Past Surgical History:   Procedure Laterality Date   • ABDOMINAL HYSTERECTOMY W/SALPINGECTOMY     • AMPUTATION  Right great toe, 1st 1/3 metatarsal -Reg 4/14/21   • AORTAGRAM N/A 04/09/2021    Procedure: AORTAGRAM WITH OR WITHOUT RUNOFFS, WITH Co2;  Surgeon: Trey Forrest MD;  Location:  [facility];  Service: Vascular;  Laterality: N/A;   • AORTAGRAM N/A 09/28/2022    Procedure: CO2 ANGIOGRAM, LEFT SFA ANGIOPLASTY WITH DRUG ELUTING BALLOON, LEFT SFA STENT PLACEMENT ;  Surgeon: Trey Forrest MD;  Location:  [facility];  Service: Vascular;  Laterality: N/A;  FLUORO: 13.12  DOSE 162mGy  CONTRAST: Isovue 350: 15ml   • APPENDECTOMY     • BRAIN TUMOR EXCISION      laser surgery    • CARDIAC CATHETERIZATION N/A 06/21/2021    Procedure: LEFT HEART CATH;  Surgeon: Anjum Ceballos MD;  Location:  [facility]IVE LOC[facility];  Service: Cardiology;  Laterality: N/A;   • CATARACT EXTRACTION W/ INTRAOCULAR LENS  IMPLANT, BILATERAL     • CHOLECYSTECTOMY     • COLONOSCOPY     • ENDOSCOPY N/A  10/07/2022    Procedure: ESOPHAGOGASTRODUODENOSCOPY;  Surgeon: Stef Veras MD;  Location:  [facility];  Service: Gastroenterology;  Laterality: N/A;   • EYE SURGERY     • FEMORAL ARTERY STENT  10/2022   • HYSTERECTOMY     • INTERVENTIONAL RADIOLOGY PROCEDURE N/A 1/15/2023    Procedure: CV INTERVENTIONAL RADIOLOGY PROCEDURE;  Surgeon: Sanket Zapata MD;  Location:  [facility]AS[facility];  Service: Interventional Radiology;  Laterality: N/A;   • TOE SURGERY     • TRANS METATARSAL AMPUTATION Right 04/14/2021    Procedure: AMPUTATION TRANS METATARSAL RIGHT GREAT TOE;  Surgeon: Valdez Finney MD;  Location:  [facility];  Service: Vascular;  Laterality: Right;         EVALUATION   PT Ortho     Row Name 03/15/23 1300       Subjective Comments    Subjective Comments Pt with two new areas - one to the left foot about 10 days ago with no clear etiology - dtr suspects the edge of a cabinet or drawer. Pt also with new skin tear to the L prox/lateral leg, also of unknown specific etiology. Dtr very concerned about L heel area, and is willing to do twice/week treatment to enable MIST usage. They have a CTS follow up within the next several weeks and will ask if any additional studies need done to reassess blood flow.  -MC       Subjective Pain    Able to rate subjective pain? yes  -MC    Pre-Treatment Pain Level 4  -MC    Post-Treatment Pain Level 4  -MC    Subjective Pain Comment L heel very tender to palpation  -MC       Transfers    Comment, (Transfers) seated in transport w/c for tx  -MC          User Key  (r) = Recorded By, (t) = Taken By, (c) = Cosigned By    Initials Name Provider Type    Lucila Mohan PT Physical Therapist                 [facility] Wound     Row Name 03/15/23 1300             Wound 03/15/23 1115 Left lateral foot Traumatic    Wound - Properties Group Placement Date: 03/15/23  - Placement Time: 1115  - Present on Hospital Admission: N  -MC Side: Left  - Orientation: lateral  -  "Location: foot  -MC Primary Wound Type: Traumatic  -MC    Wound Image Images linked: 1  -MC      Dressing Appearance intact;moist drainage  -MC      Base moist;pink;red  -MC      Periwound intact;dry;pink  -MC      Periwound Temperature warm  -MC      Periwound Skin Turgor soft  -MC      Edges irregular;open  -MC      Wound Length (cm) 0.3 cm  -MC      Wound Width (cm) 0.1 cm  -MC      Wound Depth (cm) 0.2 cm  -MC      Wound Surface Area (cm^2) 0.03 cm^2  -MC      Wound Volume (cm^3) 0.006 cm^3  -MC      Drainage Characteristics/Odor serous  -MC      Drainage Amount small  -      Care, Wound cleansed with;wound cleanser;debrided  -      Dressing Care dressing applied;silver impregnated;collagen;low-adherent;foam;border dressing  carolee, 2\" optifoam  -      Periwound Care cleansed with pH balanced cleanser;dry periwound area maintained  -      Retired Wound - Properties Group Placement Date: 03/15/23  - Placement Time: 1115  - Present on Hospital Admission: N  - Side: Left  - Orientation: lateral  - Location: foot  -MC Primary Wound Type: Traumatic  -MC    Retired Wound - Properties Group Date first assessed: 03/15/23  - Time first assessed: 1115  - Present on Hospital Admission: N  - Side: Left  - Location: foot  -MC Primary Wound Type: Traumatic  -MC       Wound 03/15/23 1115 Left proximal leg Skin Tear    Wound - Properties Group Placement Date: 03/15/23  - Placement Time: 1115  - Present on Hospital Admission: Y  - Side: Left  - Orientation: proximal  - Location: leg  - Primary Wound Type: Skin tear  -MC    Wound Image Images linked: 1  -MC      Dressing Appearance intact;no drainage  -MC      Base necrotic;purple;red  partially necrotic skin flap remaining, clotted blood in base  -MC      Periwound intact;dry;pink;swelling  -MC      Periwound Temperature warm  -MC      Periwound Skin Turgor soft  -MC      Edges irregular;jagged  -MC      Wound Length (cm) 1.4 cm  measured " "open area only, additional ecchymosis not measured  -MC      Wound Width (cm) 0.6 cm  -MC      Wound Depth (cm) --  obscured  -MC      Wound Surface Area (cm^2) 0.84 cm^2  -MC      Drainage Characteristics/Odor sanguineous  -MC      Drainage Amount small  -MC      Care, Wound cleansed with;wound cleanser;debrided  -MC      Dressing Care dressing applied;petroleum-based;gauze;low-adherent;foam;border dressing  xeroform, 4\" optifoam, MLW  -MC      Retired Wound - Properties Group Placement Date: 03/15/23  - Placement Time: 1115  - Present on Hospital Admission: Y  -MC Side: Left  - Orientation: proximal  - Location: leg  -MC Primary Wound Type: Skin tear  -MC    Retired Wound - Properties Group Date first assessed: 03/15/23  - Time first assessed: 1115  - Present on Hospital Admission: Y  - Side: Left  - Location: leg  - Primary Wound Type: Skin tear  -MC       Wound 03/10/23 1300 Left upper arm Skin Tear    Wound - Properties Group Placement Date: 03/10/23  - Placement Time: 1300  -JM Side: Left  - Orientation: upper  - Location: arm  - Primary Wound Type: Skin tear  -    Wound Image Images linked: 1  -MC      Dressing Appearance intact;moist drainage  -MC      Base moist;pink;red  -MC      Periwound intact;ecchymotic  -      Periwound Temperature warm  -      Periwound Skin Turgor soft  -      Edges open;irregular  -MC      Wound Length (cm) 0.3 cm  -MC      Wound Width (cm) 0.8 cm  -MC      Wound Depth (cm) 0.1 cm  -MC      Wound Surface Area (cm^2) 0.24 cm^2  -MC      Wound Volume (cm^3) 0.024 cm^3  -MC      Drainage Characteristics/Odor serosanguineous  -MC      Drainage Amount scant  -MC      Care, Wound cleansed with;wound cleanser;debrided  -MC      Dressing Care dressing applied;petroleum-based;gauze;low-adherent;foam;border dressing  xeroform, 4\" optifoam  -      Periwound Care cleansed with pH balanced cleanser;dry periwound area maintained  -      Retired Wound - " "Properties Group Placement Date: 03/10/23  - Placement Time: 1300  -JM Side: Left  - Orientation: upper  - Location: arm  - Primary Wound Type: Skin tear  -JM    Retired Wound - Properties Group Date first assessed: 03/10/23  - Time first assessed: 1300  -JM Side: Left  - Location: arm  - Primary Wound Type: Skin tear  -JM       Wound 03/03/23 1300 Left posterior wrist Skin Tear    Wound - Properties Group Placement Date: 03/03/23  - Placement Time: 1300  -MC Present on Hospital Admission: N  -MC Side: Left  -MC Orientation: posterior  -MC Location: wrist  - Primary Wound Type: Skin tear  -MC    Wound Image Images linked: 1  -      Dressing Appearance intact;moist drainage  -      Base moist;pink;red  -      Periwound intact;dry;pink;redness;ecchymotic  -      Periwound Temperature warm  -      Periwound Skin Turgor soft  -      Edges irregular;open;jagged  -      Wound Length (cm) 0.3 cm  -      Wound Width (cm) 0.2 cm  -      Wound Depth (cm) 0.1 cm  -      Wound Surface Area (cm^2) 0.06 cm^2  -MC      Wound Volume (cm^3) 0.006 cm^3  -      Drainage Characteristics/Odor sanguineous  -      Drainage Amount scant  -      Care, Wound cleansed with;wound cleanser;debrided  -      Dressing Care dressing applied;petroleum-based;gauze;low-adherent;foam;border dressing  xeroform, 4\" optifoam  -      Periwound Care cleansed with pH balanced cleanser;dry periwound area maintained  -      Retired Wound - Properties Group Placement Date: 03/03/23  - Placement Time: 1300  -MC Present on Hospital Admission: N  -MC Side: Left  - Orientation: posterior  - Location: wrist  - Primary Wound Type: Skin tear  -MC    Retired Wound - Properties Group Date first assessed: 03/03/23  - Time first assessed: 1300  -MC Present on Hospital Admission: N  -MC Side: Left  - Location: wrist  - Primary Wound Type: Skin tear  -MC       Wound 02/24/23 1300 Right proximal leg Skin Tear    " "Wound - Properties Group Placement Date: 02/24/23  - Placement Time: 1300  -JM Side: Right  - Orientation: proximal  - Location: leg  -JM Primary Wound Type: Skin tear  -JM    Wound Image Images linked: 1  -MC      Dressing Appearance intact;dry  -MC      Base pink;red;epithelialization  thin layer of epithelialization over both areas  -      Periwound ecchymotic;edematous;redness;swelling;pale white;macerated  mod maceration distally  -      Periwound Temperature warm  -      Periwound Skin Turgor soft  -MC      Drainage Amount none  -      Care, Wound cleansed with;wound cleanser;debrided  -      Dressing Care dressing applied;silver impregnated;low-adherent;foam;border dressing;multi-layer wrap  mepilex Ag, 6\" optifoam, MLW  -      Periwound Care cleansed with pH balanced cleanser;dry periwound area maintained  -      Retired Wound - Properties Group Placement Date: 02/24/23  - Placement Time: 1300  - Side: Right  - Orientation: proximal  - Location: leg  -JM Primary Wound Type: Skin tear  -JM    Retired Wound - Properties Group Date first assessed: 02/24/23  - Time first assessed: 1300  - Side: Right  - Location: leg  - Primary Wound Type: Skin tear  -JM       Wound 01/28/23 1613 Left posterior heel Pressure Injury    Wound - Properties Group Placement Date: 01/28/23  - Placement Time: 1613  -WR Present on Hospital Admission: Y  -WR Side: Left  -WR Orientation: posterior  -WR Location: heel  -WR Primary Wound Type: Pressure inj  -WR    Wound Image Images linked: 1  -MC      Dressing Appearance intact;moist drainage  -      Base moist;necrotic;yellow;slough;pink;red;granulating  moist fibrous slough, some additional granulation budding noted  -      Periwound intact;blanchable;pink;macerated  min maceration  -      Periwound Temperature warm  -      Periwound Skin Turgor soft  -      Edges open  -      Wound Length (cm) 1 cm  -      Wound Width (cm) 1.3 cm  -  " "    Wound Depth (cm) --  obscured  -      Wound Surface Area (cm^2) 1.3 cm^2  -      Drainage Characteristics/Odor serosanguineous;yellow  -      Drainage Amount small  -      Care, Wound cleansed with;wound cleanser;debrided  -      Dressing Care dressing applied;silver impregnated;collagen;foam;low-adherent;antimicrobial agent applied;border dressing  carolee, HFBr, 6\" optifoam  -      Periwound Care cleansed with pH balanced cleanser;dry periwound area maintained  -      Retired Wound - Properties Group Placement Date: 01/28/23  -WR Placement Time: 1613  -WR Present on Hospital Admission: Y  -WR Side: Left  -WR Orientation: posterior  -WR Location: heel  -WR Primary Wound Type: Pressure inj  -WR    Retired Wound - Properties Group Date first assessed: 01/28/23  -WR Time first assessed: 1613  -WR Present on Hospital Admission: Y  -WR Side: Left  -WR Location: heel  -WR Primary Wound Type: Pressure inj  -WR          User Key  (r) = Recorded By, (t) = Taken By, (c) = Cosigned By    Initials Name Provider Type     Lucila Ulrich, PT Physical Therapist    Shi Mane, PT Physical Therapist    Aditi Shafer, RN Registered Nurse               Lymphedema     Row Name 03/15/23 1300             Lymphedema Edema Assessment    Pitting Edema Mild;Moderate  -         Skin Changes/Observations    Location/Assessment Lower Extremity  -      Lower Extremity Conditions bilateral:;clean;dry;shiny;fragile  -      Lower Extremity Color/Pigment bilateral:;blanchable;hyperpigmented;purple  areas of ecchymosis  -         Lymphedema Pulses/Capillary Refill    Lower Extremity Capillary Refill right:;left:;less than 3 seconds  -         Compression/Skin Care    Compression/Skin Care skin care;wrapping location;bandaging  -      Skin Care washed/dried;lotion applied  -      Wrapping Location lower extremity  -      Wrapping Location LE bilateral:;foot to knee  -      Wrapping Comments " size 4 compressogrips doubled distal 1/2 for gradient compression  -MC            User Key  (r) = Recorded By, (t) = Taken By, (c) = Cosigned By    Initials Name Provider Type    Lucila Mohan, PT Physical Therapist                WOUND DEBRIDEMENT  Total area of Debridement: 10 cm2  Debridement Site 1  Location- Site 1: L heel  Selective Debridement- Site 1: Wound Surface <20cmsq  Instruments- Site 1: tweezers, #15, scapel  Excised Tissue Description- Site 1: moderate, slough, necrotic  Bleeding- Site 1: none   Debridement Site 2  Location- Site 2: LUE skin tears  Selective Debridement- Site 2: Wound Surface <20cmsq  Instruments- Site 2: tweezers, scissors, #15, scapel  Excised Tissue Description- Site 2: minimum, slough, other (comment) (crust, skin flap)  Bleeding- Site 2: scant, held pressure   Debridement Site 3  Location- Site 3: BLE skin tears  Selective Debridement- Site 3: Wound Surface <20cmsq  Instruments- Site 3: tweezers, #15, scapel  Excised Tissue Description- Site 3: moderate, slough, necrotic (skin flap, crusts)  Bleeding- Site 3: scant, held pressure      Therapy Education     Row Name 03/15/23 1300             Therapy Education    Education Details Continue xeroform to LUE skin tears and new lateral LLE skin tear with optifoams to cover. Continue mepilex Ag/optifoam to RLE skin tears. Apply carolee to L foot wound and cover with small optifoam. Add carolee and continue with HFBr/optifoam to L heel. Reviewed potential use of MIST ultrasound with increased frequency to twice/week to address the slow healing of the L heel. Also encouraged pt/family to discuss blood flow status with CTS at their next follow-up.  -MC      Given Symptoms/condition management;Bandaging/dressing change  -MC      Program Reinforced;Modified;Progressed  -MC      How Provided Verbal;Demonstration  -MC      Provided to Patient;Caregiver  -MC      Level of Understanding Verbalized;Teach back education performed  -MC             User Key  (r) = Recorded By, (t) = Taken By, (c) = Cosigned By    Initials Name Provider Type    Lucila Mohan PT Physical Therapist                Recommendation and Plan   PT Assessment/Plan     Row Name 03/15/23 1300          PT Assessment    Functional Limitations Performance in self-care ADL;Impaired gait;Other (comment)  wound management limitations  -     Impairments Integumentary integrity;Pain;Impaired sensory integrity  -     Rehab Potential Fair  -     Patient/caregiver participated in establishment of treatment plan and goals Yes  -     Patient would benefit from skilled therapy intervention Yes  -        PT Plan    PT Frequency 2x/week  -     Predicted Duration of Therapy Intervention (PT) 20 visits  -     Planned CPT's? PT SELF CARE/MGMT/TRAIN 15 MIN: 04738;PT NONSELECT DEBRIDE 15 MIN: 27127;PT PIPE DEBRIDE OPEN WOUND UP TO 20 CM: 23104;PT PIPE DEBRIDE OPEN WOUND EA ADD 20 CM: 66916;PT NLFU MIST: 11609;PT UNNA BOOT: 81192;PT MULTI LAYER COMP SYS LE;PT THER SUPP EA 15 MIN  -     Physical Therapy Interventions (Optional Details) wound care;patient/family education  -     PT Plan Comments debridement, MIST, MLW  -           User Key  (r) = Recorded By, (t) = Taken By, (c) = Cosigned By    Initials Name Provider Type    Lucila Mohan, PT Physical Therapist                Goals   PT OP Goals     Row Name 03/15/23 1346 03/15/23 1300       PT Short Term Goals    STG 1 -- Reduce L heel wound dimensions by 25% as evidence of wound healing.  -    STG 1 Progress -- Goal Revised  -    STG 2 -- Increase granulation formation to 25% of wound bed or greater, to promote wound closure.  -    STG 2 Progress -- Ongoing  -       Long Term Goals    LTG 1 -- Pt/family independent with home dressing changes.  -    LTG 1 Progress -- Ongoing  -    LTG 2 -- L foot wound and L proximal leg wound to be closed/resurfaced to demonstrate appropriate healing.  -    LTG 2  Progress -- Goal Revised  -    LTG 3 -- Patient/family indpendent with compression use for home edema management.  -    LTG 3 Progress -- Ongoing  -    LTG 4 -- Pt will demonstrate 90% reduction in L heel wound area to indicate healing progress.  -    LTG 4 Progress -- New  -       Time Calculation    PT Goal Re-Cert Due Date 05/18/23  - 05/18/23  -          User Key  (r) = Recorded By, (t) = Taken By, (c) = Cosigned By    Initials Name Provider Type    Lucila Mohan, PT Physical Therapist                PT Goal Re-Cert Due Date: 05/18/23  PT Short Term Goals  STG 1: Reduce L heel wound dimensions by 25% as evidence of wound healing.  STG 1 Progress: Goal Revised  STG 2: Increase granulation formation to 25% of wound bed or greater, to promote wound closure.  STG 2 Progress: Ongoing  Long Term Goals  LTG 1: Pt/family independent with home dressing changes.  LTG 1 Progress: Ongoing  LTG 2: L foot wound and L proximal leg wound to be closed/resurfaced to demonstrate appropriate healing.  LTG 2 Progress: Goal Revised  LTG 3: Patient/family indpendent with compression use for home edema management.  LTG 3 Progress: Ongoing  LTG 4: Pt will demonstrate 90% reduction in L heel wound area to indicate healing progress.  LTG 4 Progress: New      Time Calculation: Start Time: 1115  Untimed Charges  42443-Isklihccol comp below knee: 15  15319-Ruqthhmcx debridement: 30  Total Minutes  Untimed Charges Total Minutes: 45   Total Minutes: 45  Therapy Charges for Today     Code Description Service Date Service Provider Modifiers Qty    24595023576 HC PT MULTI LAYER COMP SYS BELOW KNEE 3/15/2023 Lucila Ulrich, PT GP 1    [id number] HC PIPE DEBRIDE OPEN WOUND UP TO 20CM 3/15/2023 Lucila Ulrich, PT GP 1                  Lucila Ulrich, PT  3/15/2023

## 2023-03-21 ENCOUNTER — HOSPITAL ENCOUNTER (OUTPATIENT)
Dept: PHYSICAL THERAPY | Facility: HOSPITAL | Age: 83
Setting detail: THERAPIES SERIES
Discharge: HOME | End: 2023-03-21
Payer: MEDICARE

## 2023-03-21 ENCOUNTER — TELEPHONE (OUTPATIENT)
Dept: CARDIAC SURGERY | Facility: CLINIC | Age: 83
End: 2023-03-21
Payer: MEDICARE

## 2023-03-21 DIAGNOSIS — S81.811A ISTAP TYPE 2 SKIN TEAR OF RIGHT LOWER LEG: ICD-10-CM

## 2023-03-21 DIAGNOSIS — S61.512A: ICD-10-CM

## 2023-03-21 DIAGNOSIS — R60.0 BILATERAL LOWER EXTREMITY EDEMA: ICD-10-CM

## 2023-03-21 DIAGNOSIS — S91.301D OPEN WOUND OF RIGHT HEEL, SUBSEQUENT ENCOUNTER: ICD-10-CM

## 2023-03-21 DIAGNOSIS — S81.812A ISTAP TYPE 2 SKIN TEAR OF LEFT LOWER LEG: ICD-10-CM

## 2023-03-21 DIAGNOSIS — S91.302D OPEN WOUND OF LEFT HEEL, SUBSEQUENT ENCOUNTER: Primary | ICD-10-CM

## 2023-03-21 DIAGNOSIS — S91.302D OPEN WOUND OF LEFT FOOT, SUBSEQUENT ENCOUNTER: ICD-10-CM

## 2023-03-21 PROCEDURE — 29581 APPL MULTLAYER CMPRN SYS LEG: CPT

## 2023-03-21 PROCEDURE — 97610 LOW FREQUENCY NON-THERMAL US: CPT

## 2023-03-21 PROCEDURE — 97597 DBRDMT OPN WND 1ST 20 CM/<: CPT

## 2023-03-21 NOTE — THERAPY WOUND CARE TREATMENT
Outpatient Rehabilitation - Wound/Debridement Treatment Note  [facility]     Patient Name: Susan Anderson  : 1940  MRN: [id number]  Today's Date: 3/21/2023                 Admit Date: 3/21/2023    Visit Dx:    ICD-10-CM ICD-9-CM   1. Open wound of left heel, subsequent encounter  S91.302D V58.89     892.0   2. Bilateral lower extremity edema  R60.0 782.3   3. ISTAP type 2 skin tear of right lower leg  S81.811A 891.0   4. ISTAP type 2 skin tear of left wrist  S61.512A 881.02   5. ISTAP type 2 skin tear of left lower leg  S81.812A 891.0   6. Open wound of left foot, subsequent encounter  S91.302D V58.89     892.0   7. Open wound of right heel, subsequent encounter  S91.301D V58.89     892.0     L heel      R proximal calf      R proximal arm      R forearm      *NEW* R 4th toe      Patient Active Problem List   Diagnosis   • Diabetic foot ulcer ([facility])   • PVD (peripheral vascular disease) ([facility])   • Osteomyelitis ([facility])   • Diabetes mellitus with neuropathy ([facility])   • Essential hypertension   • Stage 3a chronic kidney disease ([facility])   • Pyogenic inflammation of bone ([facility])   • Hyperglycemia   • Pneumonia due to infectious organism   • Mitral valve disease   • NICM (nonischemic cardiomyopathy) ([facility])   • CAD   • Dyslipidemia   • Chronic combined systolic and diastolic heart failure    • Lumbar stenosis with neurogenic claudication   • Spondylosis of lumbar region without myelopathy or radiculopathy   • Spondylolisthesis, lumbar region   • Degeneration of lumbar or lumbosacral intervertebral disc   • Gait disturbance   • Physical deconditioning   • Sarcopenia   • Weakness   • Anemia   • Fall   • Dizziness   • Demand ischemia ([facility])   • Effusion of right knee   • Right knee pain   • Pressure injury of skin of sacral region   • Sleep apnea   • GIB (gastrointestinal bleeding)   • Vasovagal syncope   • Hypomagnesemia   • Syncope, unspecified syncope type   • Paroxysmal atrial fibrillation   • Ulcer of esophagus  without bleeding   • T2DM    • COVID-19   • Sepsis (HCC)        Past Medical History:   Diagnosis Date   • Anxiety    • Arthritis    • Asthma 6/4 - double pneumonia    Currently on inhaler and nebulizer   • Atrial fibrillation (HCC) 08/21/2022   • Cancer (HCC)     cervical cancer, skin cancer   • CHF (congestive heart failure) (HCC) 06/04/2021   • Chronic kidney disease Related to diabetes   • Coronary artery disease 6/4/2021 DX for hear failure   • Diabetes mellitus (HCC) 30 years    Seeing Dr. Lam 1st time Aug 19   • GERD (gastroesophageal reflux disease)    • Gout    • History of staph infection 10/2021    right toe   • History of transfusion     no reactions, 1 unit, [facility]   • Hx of colonoscopy    • Hyperlipidemia Reference current labs x 2-3yrs approx   • Hypertension 30 years   • Insomnia    • Migraine    • Mitral valve disease    • Mitral valve disease    • Osteomyelitis (HCC)    • Peripheral neuropathy    • Sleep apnea    • Type 2 diabetes mellitus (HCC)     30 years        Past Surgical History:   Procedure Laterality Date   • ABDOMINAL HYSTERECTOMY W/SALPINGECTOMY     • AMPUTATION  Right great toe, 1st 1/3 metatarsal -[place] 4/14/21   • AORTAGRAM N/A 04/09/2021    Procedure: AORTAGRAM WITH OR WITHOUT RUNOFFS, WITH Co2;  Surgeon: Trey Forrest MD;  Location:  [facility];  Service: Vascular;  Laterality: N/A;   • AORTAGRAM N/A 09/28/2022    Procedure: CO2 ANGIOGRAM, LEFT SFA ANGIOPLASTY WITH DRUG ELUTING BALLOON, LEFT SFA STENT PLACEMENT ;  Surgeon: Trey Forrest MD;  Location:  [facility];  Service: Vascular;  Laterality: N/A;  FLUORO: 13.12  DOSE 162mGy  CONTRAST: Isovue 350: 15ml   • APPENDECTOMY     • BRAIN TUMOR EXCISION      laser surgery    • CARDIAC CATHETERIZATION N/A 06/21/2021    Procedure: LEFT HEART CATH;  Surgeon: Anjum Ceballos MD;  Location:  [facility] INVASIVE LOCATION;  Service: Cardiology;  Laterality: N/A;   • CATARACT EXTRACTION W/ INTRAOCULAR LENS  IMPLANT, BILATERAL      • CHOLECYSTECTOMY     • COLONOSCOPY     • ENDOSCOPY N/A 10/07/2022    Procedure: ESOPHAGOGASTRODUODENOSCOPY;  Surgeon: Stef Veras MD;  Location: [facility] ENDOSCOPY;  Service: Gastroenterology;  Laterality: N/A;   • EYE SURGERY     • FEMORAL ARTERY STENT  10/2022   • HYSTERECTOMY     • INTERVENTIONAL RADIOLOGY PROCEDURE N/A 1/15/2023    Procedure: CV INTERVENTIONAL RADIOLOGY PROCEDURE;  Surgeon: Sanket Zapata MD;  Location:  [facility]VASIVE [facility];  Service: Interventional Radiology;  Laterality: N/A;   • TOE SURGERY     • TRANS METATARSAL AMPUTATION Right 04/14/2021    Procedure: AMPUTATION TRANS METATARSAL RIGHT GREAT TOE;  Surgeon: Valdez Finney MD;  Location:  [facility] OR;  Service: Vascular;  Laterality: Right;         EVALUATION   PT Ortho     Row Name 03/21/23 1500       Subjective Comments    Subjective Comments Pt reports she thinks her R leg and L arm wounds are doing well although her L heel is still very painful. Pt's daughter reports they are waiting on an appointment with CTS for a vascular study.  -       Subjective Pain    Able to rate subjective pain? yes  -    Pre-Treatment Pain Level 4  -    Post-Treatment Pain Level 4  -    Subjective Pain Comment L heel very tender to palpation  -       Transfers    Comment, (Transfers) seated in transport w/c for tx  -          User Key  (r) = Recorded By, (t) = Taken By, (c) = Cosigned By    Initials Name Provider Type     López Graham, PT Physical Therapist                 [facility] Wound     Row Name 03/21/23 1500             Wound 03/15/23 1115 Left lateral foot Traumatic    Wound - Properties Group Placement Date: 03/15/23  - Placement Time: 1115  - Present on Hospital Admission: N  - Side: Left  - Orientation: lateral  - Location: foot  - Primary Wound Type: Traumatic  -    Dressing Appearance open to air  -      Base moist;pink;red  -      Periwound intact;dry;pink  -      Periwound Temperature warm  -       "Periwound Skin Turgor soft  -LH      Edges irregular;open  -LH      Wound Length (cm) 0.2 cm  -LH      Wound Width (cm) 0.1 cm  -LH      Wound Depth (cm) 0.1 cm  -LH      Wound Surface Area (cm^2) 0.02 cm^2  -LH      Wound Volume (cm^3) 0.002 cm^3  -LH      Drainage Characteristics/Odor serous  -LH      Drainage Amount small  -LH      Care, Wound cleansed with;wound cleanser;debrided  -LH      Dressing Care dressing applied;silver impregnated;collagen;low-adherent;foam;border dressing  carolee, 2\" optifoam  -      Periwound Care cleansed with pH balanced cleanser;dry periwound area maintained  -      Retired Wound - Properties Group Placement Date: 03/15/23  - Placement Time: 1115  - Present on Hospital Admission: N  - Side: Left  - Orientation: lateral  - Location: foot  - Primary Wound Type: Traumatic  -MC    Retired Wound - Properties Group Date first assessed: 03/15/23  - Time first assessed: 1115  - Present on Hospital Admission: N  - Side: Left  - Location: foot  - Primary Wound Type: Traumatic  -MC       Wound 03/15/23 1115 Left proximal leg Skin Tear    Wound - Properties Group Placement Date: 03/15/23  - Placement Time: 1115  - Present on Hospital Admission: Y  - Side: Left  - Orientation: proximal  - Location: leg  - Primary Wound Type: Skin tear  -    Base clean;granulating;red  -      Periwound intact;dry;pink;swelling  -      Periwound Temperature warm  -      Periwound Skin Turgor soft  -LH      Edges open  -LH      Drainage Characteristics/Odor sanguineous  -LH      Drainage Amount scant  -LH      Care, Wound cleansed with;wound cleanser;debrided  -LH      Dressing Care dressing changed;silver impregnated;low-adherent;foam;border dressing;multi-layer wrap  Mepilex Ag, 4\" optifoam, MLW  -LH      Retired Wound - Properties Group Placement Date: 03/15/23  - Placement Time: 1115  - Present on Hospital Admission: Y  - Side: Left  - Orientation: proximal  " "- Location: leg  -MC Primary Wound Type: Skin tear  -MC    Retired Wound - Properties Group Date first assessed: 03/15/23  - Time first assessed: 1115  -MC Present on Hospital Admission: Y  -MC Side: Left  -MC Location: leg  -MC Primary Wound Type: Skin tear  -MC       Wound 03/10/23 1300 Left upper arm Skin Tear    Wound - Properties Group Placement Date: 03/10/23  - Placement Time: 1300  - Side: Left  - Orientation: upper  - Location: arm  - Primary Wound Type: Skin tear  -JM    Wound Image Images linked: 1  -LH      Dressing Appearance intact;dry  bandaid  -      Base dry;epithelialization;closed/resurfaced  -      Periwound intact  -      Periwound Temperature warm  -      Periwound Skin Turgor soft  -      Wound Length (cm) 0 cm  -      Wound Width (cm) 0 cm  -      Wound Depth (cm) 0 cm  -      Wound Surface Area (cm^2) 0 cm^2  -LH      Wound Volume (cm^3) 0 cm^3  -      Drainage Amount none  -      Care, Wound cleansed with;soap and water  -      Dressing Care dressing applied;low-adherent;border dressing  4\" optifoam  -      Periwound Care cleansed with pH balanced cleanser;dry periwound area maintained  -      Retired Wound - Properties Group Placement Date: 03/10/23  - Placement Time: 1300  - Side: Left  - Orientation: upper  - Location: arm  - Primary Wound Type: Skin tear  -JM    Retired Wound - Properties Group Date first assessed: 03/10/23  - Time first assessed: 1300  - Side: Left  - Location: arm  - Primary Wound Type: Skin tear  -JM       Wound 03/03/23 1300 Left posterior wrist Skin Tear    Wound - Properties Group Placement Date: 03/03/23  - Placement Time: 1300  - Present on Hospital Admission: N  -MC Side: Left  - Orientation: posterior  - Location: wrist  -MC Primary Wound Type: Skin tear  -MC    Wound Image Images linked: 1  -LH      Dressing Appearance other (see comments);dry;intact  bandaid  -      Base " "dry;epithelialization;closed/resurfaced  -      Periwound intact;dry;pink  -      Periwound Temperature warm  -      Periwound Skin Turgor soft  -      Wound Length (cm) 0 cm  -      Wound Width (cm) 0 cm  -      Wound Depth (cm) 0 cm  -      Wound Surface Area (cm^2) 0 cm^2  -LH      Wound Volume (cm^3) 0 cm^3  -LH      Drainage Amount none  -      Care, Wound cleansed with;soap and water  -      Dressing Care dressing applied;low-adherent;border dressing  4\" optifoam  -      Periwound Care cleansed with pH balanced cleanser;dry periwound area maintained  -      Retired Wound - Properties Group Placement Date: 03/03/23  - Placement Time: 1300  - Present on Hospital Admission: N  - Side: Left  - Orientation: posterior  - Location: wrist  -MC Primary Wound Type: Skin tear  -MC    Retired Wound - Properties Group Date first assessed: 03/03/23  - Time first assessed: 1300  - Present on Hospital Admission: N  - Side: Left  - Location: wrist  - Primary Wound Type: Skin tear  -MC       Wound 02/24/23 1300 Right proximal leg Skin Tear    Wound - Properties Group Placement Date: 02/24/23  - Placement Time: 1300  - Side: Right  - Orientation: proximal  - Location: leg  - Primary Wound Type: Skin tear  -JM    Wound Image Images linked: 1  -      Dressing Appearance intact;dry  -      Base clean;dry;closed/resurfaced;epithelialization  thin layer of epithelialization over both areas  -      Periwound edematous;swelling;intact;dry  -LH      Periwound Temperature warm  -      Periwound Skin Turgor soft  -      Wound Length (cm) 0 cm  -      Wound Width (cm) 0 cm  -      Wound Depth (cm) 0 cm  -      Wound Surface Area (cm^2) 0 cm^2  -LH      Wound Volume (cm^3) 0 cm^3  -LH      Drainage Amount none  -      Care, Wound cleansed with;soap and water  -      Dressing Care dressing applied;low-adherent;border dressing  6\" optifoam  -      Periwound Care " "cleansed with pH balanced cleanser;dry periwound area maintained  -      Retired Wound - Properties Group Placement Date: 02/24/23  - Placement Time: 1300  -JM Side: Right  - Orientation: proximal  - Location: leg  -JM Primary Wound Type: Skin tear  -JM    Retired Wound - Properties Group Date first assessed: 02/24/23  - Time first assessed: 1300  -JM Side: Right  - Location: leg  -JM Primary Wound Type: Skin tear  -JM       Wound 01/28/23 1613 Left posterior heel Pressure Injury    Wound - Properties Group Placement Date: 01/28/23  -WR Placement Time: 1613  -WR Present on Hospital Admission: Y  -WR Side: Left  -WR Orientation: posterior  -WR Location: heel  -WR Primary Wound Type: Pressure inj  -WR    Wound Image Images linked: 1  -      Dressing Appearance intact;moist drainage  -      Base moist;necrotic;yellow;slough;pink;red;granulating  moist fibrous slough, scant granulation budding noted at periphery  -      Periwound intact;blanchable;pink;macerated  min maceration  -      Periwound Temperature warm  -      Periwound Skin Turgor soft  -      Edges open  -      Wound Length (cm) 1 cm  -      Wound Width (cm) 1.4 cm  -      Wound Depth (cm) --  obscured  -      Wound Surface Area (cm^2) 1.4 cm^2  -      Drainage Characteristics/Odor serosanguineous;yellow  -      Drainage Amount small  -      Care, Wound irrigated with;sterile normal saline;debrided  5min MIST  -      Dressing Care dressing applied;silver impregnated;collagen;foam;low-adherent;antimicrobial agent applied;border dressing  carolee, HFBr, 6\" optifoam  -      Periwound Care cleansed with pH balanced cleanser;dry periwound area maintained  -      Retired Wound - Properties Group Placement Date: 01/28/23  -WR Placement Time: 1613 -WR Present on Hospital Admission: Y  -WR Side: Left  -WR Orientation: posterior  -WR Location: heel  -WR Primary Wound Type: Pressure inj  -WR    Retired Wound - Properties " Group Date first assessed: 01/28/23  -WR Time first assessed: 1613  -WR Present on Hospital Admission: Y  -WR Side: Left  -WR Location: heel  -WR Primary Wound Type: Pressure inj  -WR          User Key  (r) = Recorded By, (t) = Taken By, (c) = Cosigned By    Initials Name Provider Type    Lucila Mohan, PT Physical Therapist    Shi Mane, PT Physical Therapist    Aditi Shafer, RN Registered Nurse     López Graham, PT Physical Therapist               Lymphedema     Row Name 03/21/23 1500             Lymphedema Edema Assessment    Ptting Edema Category By severity  -      Pitting Edema Mild  -         Skin Changes/Observations    Location/Assessment Lower Extremity  -      Lower Extremity Conditions bilateral:;clean;dry;shiny;fragile  -      Lower Extremity Color/Pigment bilateral:;blanchable;hyperpigmented;purple  areas of ecchymosis  -         Lymphedema Pulses/Capillary Refill    Lower Extremity Capillary Refill right:;left:;less than 3 seconds  -         Compression/Skin Care    Compression/Skin Care skin care;wrapping location;bandaging  -      Skin Care washed/dried;lotion applied  -      Wrapping Location lower extremity  -      Wrapping Location LE bilateral:;foot to knee  -      Wrapping Comments size 4 compressogrips doubled distal 1/2 for gradient compression  -            User Key  (r) = Recorded By, (t) = Taken By, (c) = Cosigned By    Initials Name Provider Type     López Graham, PT Physical Therapist                WOUND DEBRIDEMENT  Total area of Debridement: 4cm2  Debridement Site 1  Location- Site 1: L heel  Selective Debridement- Site 1: Wound Surface <20cmsq  Instruments- Site 1: tweezers, #15, scapel  Excised Tissue Description- Site 1: moderate, slough  Bleeding- Site 1: none              Therapy Education     Row Name 03/21/23 1500             Therapy Education    Education Details Keep covering newly closed wounds with optifoam for  approx 1 more week for protection. May switch from xeroform to mepilex for L lateral calf wound. Continue with current dressings to L heel along with compression for edema management.  -      Given Symptoms/condition management;Bandaging/dressing change  -      Program Reinforced;Modified;Progressed  -      How Provided Verbal;Demonstration  -      Provided to Patient;Caregiver  -      Level of Understanding Verbalized;Teach back education performed  -            User Key  (r) = Recorded By, (t) = Taken By, (c) = Cosigned By    Initials Name Provider Type     López Graham, PT Physical Therapist                Recommendation and Plan   PT Assessment/Plan     Row Name 03/21/23 1500          PT Assessment    Functional Limitations Performance in self-care ADL;Impaired gait;Other (comment)  wound management limitations  -     Impairments Integumentary integrity;Pain;Impaired sensory integrity  -     Assessment Comments Pt demonstrating excellent improvements in epithelialization of LUE skin tears, R proximal calf wound, and L lateral calf skin tear. Pt's LUE wounds and R proximal calf wounds are fully resurfaced this session. Pt's L lateral calf wound now fully granulated with no necrotic tissue present with greatly reduced wound dimensions. Pt's L heel wound continuing with considerable necrotic tissue covering wound base requiring debridement with scant granulation present. PT began MIST therapy to L heel wound this session to help increase blood flow via angiogenesis, promote cellular activity, and reduce bioburden. Pt also continuing with moderate BLE pitting edema requiring light compression. Pt with new area of blood filled blistering to the lateral portion of the R 4th toe that is closed at this time, but will need to be monitored closely.  -     Rehab Potential Fair  -     Patient/caregiver participated in establishment of treatment plan and goals Yes  -     Patient would benefit from  skilled therapy intervention Yes  -        PT Plan    PT Frequency 2x/week  -     Physical Therapy Interventions (Optional Details) wound care;patient/family education  -     PT Plan Comments debridement, MIST, MLW  -           User Key  (r) = Recorded By, (t) = Taken By, (c) = Cosigned By    Initials Name Provider Type     López Graham, PT Physical Therapist                Goals   PT OP Goals     Row Name 03/21/23 1549          Time Calculation    PT Goal Re-Cert Due Date 05/18/23  -           User Key  (r) = Recorded By, (t) = Taken By, (c) = Cosigned By    Initials Name Provider Type     López Graham, PT Physical Therapist                PT Goal Re-Cert Due Date: 05/18/23            Time Calculation: Start Time: 1300  Untimed Charges  Wound Care: 67116 NLFU Mist  34633-Bebsrvhvfu comp below knee: 15  04521-Lltwrudbr debridement: 25  80570-YQMR Mist: 5  Total Minutes  Untimed Charges Total Minutes: 45   Total Minutes: 45  Therapy Charges for Today     Code Description Service Date Service Provider Modifiers Qty    14379326596 HC PIPE DEBRIDE OPEN WOUND UP TO 20CM 3/21/2023 López Graham, PT GP 1    60520239461 HC PT MULTI LAYER COMP SYS BELOW KNEE 3/21/2023 López Graham, PT GP 1    10679761698 HC PT NLFU MIST 3/21/2023 López Graham, PT GP 1                  López Graham, PT  3/21/2023

## 2023-03-21 NOTE — TELEPHONE ENCOUNTER
I spoke with pts daughter  - also reviewed recent left heel ulcer with Neida SOOD.  Patient has had an extensive course over the last couple months.  She went into cardiac arrest while having endoscopy in January 2023, then had to be placed on dialysis for acute kidney injury and developed COVID while undergoing dialysis.  She has followed with her PCP and is currently under the care of wound care but has had obvious regression on ulceration.    Neida SOOD advised appt to discuss appropriate imaging needed.  Appt made and daughter notified of this.

## 2023-03-21 NOTE — TELEPHONE ENCOUNTER
Caller: GREG ELLIS    Relationship: Emergency Contact    Best call back number: [phone]       What orders are you requesting (i.e. lab or imaging): Duplex scan of lower extremity arteries or arterial bypass grafts;complete bilateral study        In what timeframe would the patient need to come in: ASAP    Where will you receive your lab/imaging services: [facility]    Additional notes: PT HAS WOUND ON HEEL-[facility] 2 WEEKS AGO-ASKED FOR REF TO WOUND CARE-STARTED US THERAPY TODAY-WOUND CARE ADVISED PT NEEDS TEST ABOVE TO SEE IF STENT IS STILL WORKING AND TO MAKE SURE SUFFICIENT BLOOD FLOW TO LEGS.

## 2023-03-22 ENCOUNTER — OFFICE VISIT (OUTPATIENT)
Dept: CARDIAC SURGERY | Facility: CLINIC | Age: 83
End: 2023-03-22
Payer: MEDICARE

## 2023-03-22 VITALS
BODY MASS INDEX: 28.34 KG/M2 | HEART RATE: 76 BPM | DIASTOLIC BLOOD PRESSURE: 80 MMHG | TEMPERATURE: 98.1 F | OXYGEN SATURATION: 92 % | SYSTOLIC BLOOD PRESSURE: 140 MMHG | HEIGHT: 63 IN

## 2023-03-22 DIAGNOSIS — I96 GANGRENE OF FOOT: ICD-10-CM

## 2023-03-22 DIAGNOSIS — I73.9 PVD (PERIPHERAL VASCULAR DISEASE): ICD-10-CM

## 2023-03-22 DIAGNOSIS — E11.42 DIABETIC PERIPHERAL NEUROPATHY ASSOCIATED WITH TYPE 2 DIABETES MELLITUS: Primary | ICD-10-CM

## 2023-03-22 DIAGNOSIS — S91.302A NON HEALING LEFT HEEL WOUND: ICD-10-CM

## 2023-03-22 PROCEDURE — 3079F DIAST BP 80-89 MM HG: CPT | Performed by: REGISTERED NURSE

## 2023-03-22 PROCEDURE — 3077F SYST BP >= 140 MM HG: CPT | Performed by: REGISTERED NURSE

## 2023-03-22 PROCEDURE — 99213 OFFICE O/P EST LOW 20 MIN: CPT | Performed by: REGISTERED NURSE

## 2023-03-22 RX ORDER — LOSARTAN POTASSIUM 50 MG/1
100 TABLET ORAL DAILY
Status: ON HOLD | COMMUNITY
Start: 2023-02-21

## 2023-03-22 NOTE — PROGRESS NOTES
[facility] Cardiothoracic Surgery Office Follow Up Note    Date of Encounter: 2023     Name: Susan Anderson  : 1940     Referred By: No ref. provider found  PCP: Arleen Doherty MD    Chief Complaint:    Chief Complaint   Patient presents with   • Follow-up     FU for Left Foot Ulceration       Subjective      History of Present Illness:    It was nice to see Susan Anderson in follow up.  She is a pleasant 82 y.o. female with extensive PMH significant for hypertension, coronary artery disease, dyslipidemia, cardiomyopathy, peripheral vascular disease, type 2 diabetes with neuropathy, stage IIIa chronic kidney disease, and nonhealing diabetic foot ulcer..      Patient is s/p aortogram with left lower extremity runoff using CO2 contrast, left superficial femoral artery angioplasty and stent placement, and completion arteriogram by Dr. Forrest and Dr. Finney on 2022.  See op report for full details.  Patient was eventually discharged home on antibiotics per infectious disease.    Ms. Anderson presents today accompanied by her daughter for early follow-up.  Patient has been following with wound care for a left heel ulcer.  Patient's daughter contacted our office on 3/21/2023 requesting patient be seen earlier due to concern of her left heel ulcer.  PT wound care had mentioned arterial studies to patient's daughter.  This was also mentioned during office visit by patient's daughter.  Patient denies fever and malaise/fatigue.  Ms. Anderson has had quite an extensive course over the last couple months.  Patient went into cardiac arrest while having an endoscopy in January.  She then had to be placed on dialysis for acute kidney injury and developed COVID on top of this.  Patient reports following with PCP.  Reports medication compliance.    Review of Systems:  Review of Systems   Constitutional: Positive for chills. Negative for decreased appetite, diaphoresis, fever, malaise/fatigue, night sweats,  weight gain and weight loss.   HENT: Negative for hoarse voice.    Eyes: Negative for blurred vision, double vision and visual disturbance.   Cardiovascular: Positive for leg swelling. Negative for chest pain, claudication, dyspnea on exertion, irregular heartbeat, near-syncope, orthopnea, palpitations, paroxysmal nocturnal dyspnea and syncope.   Respiratory: Positive for shortness of breath. Negative for cough, hemoptysis, sputum production and wheezing.    Hematologic/Lymphatic: Negative for adenopathy and bleeding problem. Bruises/bleeds easily.   Skin: Positive for poor wound healing. Negative for color change, nail changes and rash.   Musculoskeletal: Positive for arthritis and back pain. Negative for falls and muscle cramps.   Gastrointestinal: Negative for abdominal pain, dysphagia and heartburn.   Genitourinary: Negative for flank pain.   Neurological: Positive for dizziness. Negative for brief paralysis, disturbances in coordination, focal weakness, headaches, light-headedness, loss of balance, numbness, paresthesias, sensory change, vertigo and weakness.   Psychiatric/Behavioral: Negative for depression and suicidal ideas. The patient is nervous/anxious.    Allergic/Immunologic: Negative for persistent infections.       I have reviewed the following portions of the patient's history: allergies, current medications, past family history, past medical history, past social history, past surgical history and problem list and confirm it's accurate.    Allergies:  Allergies   Allergen Reactions   • Vancomycin Other (See Comments)     Acute Kidney Injury, requested by ID   • Baclofen Other (See Comments) and Hallucinations     PSYCHOSIS-POA REFUSES ADMINISTRATION OF THIS MED.   • Cephalexin Nausea Only   • Erythromycin Base Nausea Only   • Melatonin Other (See Comments)     Nightmares   • Oxycodone Nausea Only   • Protonix [Pantoprazole] Itching and Rash   • Sulfa Antibiotics Nausea Only       Medications:   "    Current Outpatient Medications:   •  acetaminophen (TYLENOL) 325 MG tablet, Take 2 tablets by mouth Every 6 (Six) Hours As Needed for Mild Pain., Disp: , Rfl:   •  apixaban (ELIQUIS) 2.5 MG tablet tablet, Take 1 tablet by mouth Every 12 (Twelve) Hours. Indications: Atrial Fibrillation, Disp: 60 tablet, Rfl: 0  •  atorvastatin (LIPITOR) 40 MG tablet, Take 1 tablet by mouth Daily., Disp: 30 tablet, Rfl: 6  •  bumetanide (BUMEX) 1 MG tablet, Take 1 tablet by mouth Daily., Disp: 30 tablet, Rfl: 5  •  clopidogrel (PLAVIX) 75 MG tablet, Take 1 tablet by mouth Daily., Disp: 30 tablet, Rfl: 6  •  clotrimazole-betamethasone (Lotrisone) 1-0.05 % cream, Apply 1 application topically to the appropriate area as directed 2 (Two) Times a Day., Disp: 45 g, Rfl: 2  •  cyclobenzaprine (FLEXERIL) 5 MG tablet, Take 1 tablet by mouth 3 (Three) Times a Day As Needed for Muscle Spasms., Disp: 30 tablet, Rfl: 0  •  gabapentin (NEURONTIN) 100 MG capsule, Take 1 capsule by mouth 2 (Two) Times a Day., Disp: 90 capsule, Rfl: 0  •  glucose blood (Accu-Chek SmartView) test strip, 1 each by Other route 3 (Three) Times a Day. Use as instructed, Disp: 300 each, Rfl: 4  •  guaiFENesin (MUCINEX) 600 MG 12 hr tablet, Take 2 tablets by mouth 2 (Two) Times a Day As Needed for Congestion., Disp: , Rfl:   •  hydrOXYzine (ATARAX) 25 MG tablet, Take 1 tablet by mouth At Night As Needed (insomnia / sleep)., Disp: 30 tablet, Rfl: 5  •  Insulin Glargine (LANTUS SOLOSTAR) 100 UNIT/ML injection pen, Inject 15 Units under the skin into the appropriate area as directed Daily., Disp: 15 mL, Rfl: 5  •  insulin lispro (HumaLOG) 100 UNIT/ML injection, 3 units tid + Sliding Scale Before Meals: 6 units- 200-250 8 units- 251-300 10 units- 301-350 12 units - 351-400, Disp: 10 mL, Rfl: 3  •  Insulin Pen Needle (Pen Needles 3/16\") 31G X 5 MM misc, 1 each Daily., Disp: 300 each, Rfl: 3  •  Lancets misc, 1 each 3 (Three) Times a Day., Disp: 100 each, Rfl: 5  •  losartan " (COZAAR) 50 MG tablet, Daily., Disp: , Rfl:   •  metoprolol succinate XL (TOPROL-XL) 25 MG 24 hr tablet, Take 1 tablet by mouth Daily., Disp: 30 tablet, Rfl: 5  •  multivitamin with minerals tablet tablet, Take 1 tablet by mouth Daily., Disp: , Rfl:   •  omeprazole (priLOSEC) 20 MG capsule, Take 1 capsule by mouth 2 (Two) Times a Day., Disp: , Rfl:   •  ondansetron ODT (ZOFRAN-ODT) 4 MG disintegrating tablet, Place 1 tablet on the tongue Every 8 (Eight) Hours As Needed for Nausea or Vomiting., Disp: 14 tablet, Rfl: 0  •  polyethylene glycol (MIRALAX) 17 GM/SCOOP powder, Take 17 g by mouth Daily As Needed (constipation)., Disp: , Rfl:   •  sennosides-docusate (PERICOLACE) 8.6-50 MG per tablet, Take 1 tablet by mouth At Night As Needed for Constipation., Disp: , Rfl:   •  losartan (Cozaar) 25 MG tablet, Take 1 tablet by mouth Daily., Disp: 30 tablet, Rfl: 5    History:   Past Medical History:   Diagnosis Date   • Anxiety    • Arthritis    • Asthma 6/4 - double pneumonia    Currently on inhaler and nebulizer   • Atrial fibrillation (HCC) 08/21/2022   • Cancer (HCC)     cervical cancer, skin cancer   • CHF (congestive heart failure) (HCC) 06/04/2021   • Chronic kidney disease Related to diabetes   • Coronary artery disease 6/4/2021 DX for hear failure   • Diabetes mellitus (HCC) 30 years    Seeing Dr. Lam 1st time Aug 19   • GERD (gastroesophageal reflux disease)    • Gout    • History of staph infection 10/2021    right toe   • History of transfusion     no reactions, 1 unit, [facility]   • Hx of colonoscopy    • Hyperlipidemia Reference current labs x 2-3yrs approx   • Hypertension 30 years   • Insomnia    • Migraine    • Mitral valve disease    • Mitral valve disease    • Osteomyelitis (HCC)    • Peripheral neuropathy    • Sleep apnea    • Type 2 diabetes mellitus (HCC)     30 years       Past Surgical History:   Procedure Laterality Date   • ABDOMINAL HYSTERECTOMY W/SALPINGECTOMY     • AMPUTATION  Right great toe, 1st  1/3 metatarsal -[place] 4/14/21   • AORTAGRAM N/A 04/09/2021    Procedure: AORTAGRAM WITH OR WITHOUT RUNOFFS, WITH Co2;  Surgeon: Trey Forrest MD;  Location:  [facility];  Service: Vascular;  Laterality: N/A;   • AORTAGRAM N/A 09/28/2022    Procedure: CO2 ANGIOGRAM, LEFT SFA ANGIOPLASTY WITH DRUG ELUTING BALLOON, LEFT SFA STENT PLACEMENT ;  Surgeon: Trey Forrest MD;  Location:  [facility];  Service: Vascular;  Laterality: N/A;  FLUORO: 13.12  DOSE 162mGy  CONTRAST: Isovue 350: 15ml   • APPENDECTOMY     • BRAIN TUMOR EXCISION      laser surgery    • CARDIAC CATHETERIZATION N/A 06/21/2021    Procedure: LEFT HEART CATH;  Surgeon: Anjum Ceballos MD;  Location:  [facility] CATH INVASIVE LOCATION;  Service: Cardiology;  Laterality: N/A;   • CATARACT EXTRACTION W/ INTRAOCULAR LENS  IMPLANT, BILATERAL     • CHOLECYSTECTOMY     • COLONOSCOPY     • ENDOSCOPY N/A 10/07/2022    Procedure: ESOPHAGOGASTRODUODENOSCOPY;  Surgeon: Stef Veras MD;  Location:  [facility];  Service: Gastroenterology;  Laterality: N/A;   • EYE SURGERY     • FEMORAL ARTERY STENT  10/2022   • HYSTERECTOMY     • INTERVENTIONAL RADIOLOGY PROCEDURE N/A 1/15/2023    Procedure: CV INTERVENTIONAL RADIOLOGY PROCEDURE;  Surgeon: Sanket Zapata MD;  Location:  [facility]VASIVE [facility];  Service: Interventional Radiology;  Laterality: N/A;   • TOE SURGERY     • TRANS METATARSAL AMPUTATION Right 04/14/2021    Procedure: AMPUTATION TRANS METATARSAL RIGHT GREAT TOE;  Surgeon: Valdez Finney MD;  [facility];  Service: Vascular;  Laterality: Right;       Social History     Socioeconomic History   • Marital status:    • Number of children: 1   Tobacco Use   • Smoking status: Never   • Smokeless tobacco: Never   Vaping Use   • Vaping Use: Never used   Substance and Sexual Activity   • Alcohol use: Yes     Comment: Social drinking when not recovering from hospitalization   • Drug use: Never   • Sexual activity: Not Currently      "Birth control/protection: None        Family History   Problem Relation Age of Onset   • Diabetes Mother         Type II   • Heart disease Father    • Heart attack Father    • Coronary artery disease Father    • Diabetes Father         Type II   • Diabetes Sister    • Diabetes Maternal Grandmother    • Diabetes Maternal Grandfather    • Diabetes Paternal Grandmother    • Diabetes Paternal Grandfather        Objective     Physical Exam:  Vitals:    03/22/23 1152   BP: 140/80   BP Location: Left arm   Patient Position: Sitting   Pulse: 76   Temp: 98.1 °F (36.7 °C)   SpO2: 92%   Weight: Comment: patient reported   Height: 160 cm (63\")  Comment: patient reported      Body mass index is 28.34 kg/m².    Physical Exam  Vitals reviewed.   Constitutional:       General: She is not in acute distress.     Appearance: She is not ill-appearing.   Pulmonary:      Effort: Pulmonary effort is normal.   Musculoskeletal:      Right lower leg: Edema present.      Left lower leg: Edema present.   Skin:     Comments: Left heel ulcer without necrosis, drainage, or foul odor.  See image below.     Neurological:      Mental Status: She is alert and oriented to person, place, and time.   Psychiatric:         Mood and Affect: Mood normal.         Behavior: Behavior normal.         Thought Content: Thought content normal.         Judgment: Judgment normal.         Imaging/Labs:             Assessment / Plan      Assessment / Plan:  extensive PMH significant for hypertension, coronary artery disease, dyslipidemia, cardiomyopathy, peripheral vascular disease, type 2 diabetes with neuropathy, stage IIIa chronic kidney disease, and nonhealing diabetic foot ulcer.      1.  Peripheral vascular disease  2.  Type 2 diabetes mellitus with neuropathy  3.  Left foot slow healing chronic ulcer  · Patient is s/p aortogram with left lower extremity runoff using CO2 contrast, left superficial femoral artery angioplasty and stent placement, and completion " arteriogram by Dr. Forrest and Dr. Finney on 9/28/2022.    · Patient was eventually discharged home on antibiotics per infectious disease.  · Ms. Anderson presents today accompanied by her daughter for early follow-up.    · Patient has been following with wound care for a left heel ulcer.    · Patient's daughter contacted our office on 3/21/2023 requesting patient be seen earlier due to concern of her left heel ulcer.   · PT wound care had mentioned arterial studies to patient's daughter.  This was also mentioned during office visit by patient's daughter.    · Patient denies fever and malaise/fatigue.    · Upon evaluation lower extremity was warm, without mottling or concerning discoloration.  · Pulses were obtainable by Doppler.  · The left heel ulcer was absent of necrosis, drainage, or foul odor.  · I did not find it necessary to order arterial Doppler studies at this time based on my assessment and findings.  · Patient was encouraged to continue with wound care.  She has had an extensive course over the past few months and with her complicated past medical history this has now became a chronic wound.    Follow Up:   We will plan for follow-up in ~4 weeks.  Patient's daughter requested to call office to schedule.  Or sooner for any further concerns or worsening sign and symptoms.  Patient encouraged to call the office with any questions or concerns.     Thank you for allowing me to participate in your care.  Best Regards,    Neida Arreola [facility] Cardiothoracic Surgery  03/22/23  11:57 EDT     Addendum:  On 3/24/2023 I received a message from PT wound care again asking about patient's last arterial studies.  I discussed patient with Dr. Forrest.  He agreed based on assessment findings, an arterial doppler study was not warranted.  I discussed this with patient by phone.  I offered arterial doppler studies if patient did not feel comfortable with our decision and wished to proceed.  Patient stated she did  not find necessary as well.  Agreed to continue with current treatment.

## 2023-03-24 ENCOUNTER — HOSPITAL ENCOUNTER (OUTPATIENT)
Dept: PHYSICAL THERAPY | Facility: HOSPITAL | Age: 83
Setting detail: THERAPIES SERIES
Discharge: HOME | End: 2023-03-24
Payer: MEDICARE

## 2023-03-24 ENCOUNTER — TRANSCRIBE ORDERS (OUTPATIENT)
Dept: LAB | Facility: HOSPITAL | Age: 83
End: 2023-03-24
Payer: MEDICARE

## 2023-03-24 ENCOUNTER — LAB (OUTPATIENT)
Dept: LAB | Facility: HOSPITAL | Age: 83
End: 2023-03-24
Payer: MEDICARE

## 2023-03-24 DIAGNOSIS — R60.0 BILATERAL LOWER EXTREMITY EDEMA: ICD-10-CM

## 2023-03-24 DIAGNOSIS — N18.31 STAGE 3A CHRONIC KIDNEY DISEASE: ICD-10-CM

## 2023-03-24 DIAGNOSIS — N17.9 ACUTE RENAL FAILURE, UNSPECIFIED ACUTE RENAL FAILURE TYPE: Primary | ICD-10-CM

## 2023-03-24 DIAGNOSIS — N17.9 ACUTE RENAL FAILURE, UNSPECIFIED ACUTE RENAL FAILURE TYPE: ICD-10-CM

## 2023-03-24 DIAGNOSIS — S91.302D OPEN WOUND OF LEFT HEEL, SUBSEQUENT ENCOUNTER: Primary | ICD-10-CM

## 2023-03-24 LAB
ALBUMIN SERPL-MCNC: 3.1 G/DL (ref 3.5–5.2)
ANION GAP SERPL CALCULATED.3IONS-SCNC: 8 MMOL/L (ref 5–15)
BASOPHILS # BLD AUTO: 0.02 10*3/MM3 (ref 0–0.2)
BASOPHILS NFR BLD AUTO: 0.4 % (ref 0–1.5)
BILIRUB UR QL STRIP: NEGATIVE
BUN SERPL-MCNC: 61 MG/DL (ref 8–23)
BUN/CREAT SERPL: 37.4 (ref 7–25)
CALCIUM SPEC-SCNC: 8.4 MG/DL (ref 8.6–10.5)
CHLORIDE SERPL-SCNC: 99 MMOL/L (ref 98–107)
CLARITY UR: ABNORMAL
CO2 SERPL-SCNC: 33 MMOL/L (ref 22–29)
COLOR UR: YELLOW
CREAT SERPL-MCNC: 1.63 MG/DL (ref 0.57–1)
CREAT UR-MCNC: 39.9 MG/DL
DEPRECATED RDW RBC AUTO: 62.1 FL (ref 37–54)
EGFRCR SERPLBLD CKD-EPI 2021: 31.2 ML/MIN/1.73
EOSINOPHIL # BLD AUTO: 0.06 10*3/MM3 (ref 0–0.4)
EOSINOPHIL NFR BLD AUTO: 1.2 % (ref 0.3–6.2)
ERYTHROCYTE [DISTWIDTH] IN BLOOD BY AUTOMATED COUNT: 17.9 % (ref 12.3–15.4)
FERRITIN SERPL-MCNC: 122 NG/ML (ref 13–150)
GLUCOSE SERPL-MCNC: 296 MG/DL (ref 65–99)
GLUCOSE UR STRIP-MCNC: ABNORMAL MG/DL
HCT VFR BLD AUTO: 32.8 % (ref 34–46.6)
HGB BLD-MCNC: 9.8 G/DL (ref 12–15.9)
HGB UR QL STRIP.AUTO: ABNORMAL
IMM GRANULOCYTES # BLD AUTO: 0.03 10*3/MM3 (ref 0–0.05)
IMM GRANULOCYTES NFR BLD AUTO: 0.6 % (ref 0–0.5)
IRON 24H UR-MRATE: 49 MCG/DL (ref 37–145)
IRON SATN MFR SERPL: 15 % (ref 20–50)
KETONES UR QL STRIP: NEGATIVE
LEUKOCYTE ESTERASE UR QL STRIP.AUTO: ABNORMAL
LYMPHOCYTES # BLD AUTO: 0.89 10*3/MM3 (ref 0.7–3.1)
LYMPHOCYTES NFR BLD AUTO: 18.1 % (ref 19.6–45.3)
MCH RBC QN AUTO: 28.7 PG (ref 26.6–33)
MCHC RBC AUTO-ENTMCNC: 29.9 G/DL (ref 31.5–35.7)
MCV RBC AUTO: 96.2 FL (ref 79–97)
MONOCYTES # BLD AUTO: 0.48 10*3/MM3 (ref 0.1–0.9)
MONOCYTES NFR BLD AUTO: 9.8 % (ref 5–12)
NEUTROPHILS NFR BLD AUTO: 3.43 10*3/MM3 (ref 1.7–7)
NEUTROPHILS NFR BLD AUTO: 69.9 % (ref 42.7–76)
NITRITE UR QL STRIP: NEGATIVE
NRBC BLD AUTO-RTO: 0 /100 WBC (ref 0–0.2)
PH UR STRIP.AUTO: 6 [PH] (ref 5–8)
PHOSPHATE SERPL-MCNC: 4.3 MG/DL (ref 2.5–4.5)
PLATELET # BLD AUTO: 246 10*3/MM3 (ref 140–450)
PMV BLD AUTO: 10.6 FL (ref 6–12)
POTASSIUM SERPL-SCNC: 4.4 MMOL/L (ref 3.5–5.2)
PROT ?TM UR-MCNC: 422.6 MG/DL
PROT UR QL STRIP: ABNORMAL
RBC # BLD AUTO: 3.41 10*6/MM3 (ref 3.77–5.28)
SODIUM SERPL-SCNC: 140 MMOL/L (ref 136–145)
SP GR UR STRIP: 1.02 (ref 1–1.03)
TIBC SERPL-MCNC: 331 MCG/DL (ref 298–536)
TRANSFERRIN SERPL-MCNC: 222 MG/DL (ref 200–360)
UROBILINOGEN UR QL STRIP: ABNORMAL
WBC NRBC COR # BLD: 4.91 10*3/MM3 (ref 3.4–10.8)

## 2023-03-24 PROCEDURE — 80069 RENAL FUNCTION PANEL: CPT | Performed by: INTERNAL MEDICINE

## 2023-03-24 PROCEDURE — 81001 URINALYSIS AUTO W/SCOPE: CPT

## 2023-03-24 PROCEDURE — 82570 ASSAY OF URINE CREATININE: CPT | Performed by: INTERNAL MEDICINE

## 2023-03-24 PROCEDURE — 84466 ASSAY OF TRANSFERRIN: CPT

## 2023-03-24 PROCEDURE — 29581 APPL MULTLAYER CMPRN SYS LEG: CPT

## 2023-03-24 PROCEDURE — 97597 DBRDMT OPN WND 1ST 20 CM/<: CPT

## 2023-03-24 PROCEDURE — 84156 ASSAY OF PROTEIN URINE: CPT | Performed by: INTERNAL MEDICINE

## 2023-03-24 PROCEDURE — 82728 ASSAY OF FERRITIN: CPT

## 2023-03-24 PROCEDURE — 97610 LOW FREQUENCY NON-THERMAL US: CPT

## 2023-03-24 PROCEDURE — 83540 ASSAY OF IRON: CPT

## 2023-03-24 PROCEDURE — 36415 COLL VENOUS BLD VENIPUNCTURE: CPT

## 2023-03-24 PROCEDURE — 85025 COMPLETE CBC W/AUTO DIFF WBC: CPT

## 2023-03-24 NOTE — THERAPY WOUND CARE TREATMENT
Outpatient Rehabilitation - Wound/Debridement Treatment Note   Edgar     Patient Name: Susan Anderson  : 1940  MRN: [id number]  Today's Date: 3/24/2023                 Admit Date: 3/24/2023    Visit Dx:    ICD-10-CM ICD-9-CM   1. Open wound of left heel, subsequent encounter  S91.302D V58.89     892.0   2. Bilateral lower extremity edema  R60.0 782.3   Lat LLE:    L heel:      Patient Active Problem List   Diagnosis   • Diabetic foot ulcer ([facility])   • PVD (peripheral vascular disease) ([facility])   • Osteomyelitis ([facility])   • Diabetes mellitus with neuropathy ([facility])   • Essential hypertension   • Stage 3a chronic kidney disease ([facility])   • Pyogenic inflammation of bone ([facility])   • Hyperglycemia   • Pneumonia due to infectious organism   • Mitral valve disease   • NICM (nonischemic cardiomyopathy) ([facility])   • CAD   • Dyslipidemia   • Chronic combined systolic and diastolic heart failure    • Lumbar stenosis with neurogenic claudication   • Spondylosis of lumbar region without myelopathy or radiculopathy   • Spondylolisthesis, lumbar region   • Degeneration of lumbar or lumbosacral intervertebral disc   • Gait disturbance   • Physical deconditioning   • Sarcopenia   • Weakness   • Anemia   • Fall   • Dizziness   • Demand ischemia ([facility])   • Effusion of right knee   • Right knee pain   • Pressure injury of skin of sacral region   • Sleep apnea   • GIB (gastrointestinal bleeding)   • Vasovagal syncope   • Hypomagnesemia   • Syncope, unspecified syncope type   • Paroxysmal atrial fibrillation   • Ulcer of esophagus without bleeding   • T2DM    • COVID-19   • Sepsis ([facility])        Past Medical History:   Diagnosis Date   • Anxiety    • Arthritis    • Asthma 6/4 - double pneumonia    Currently on inhaler and nebulizer   • Atrial fibrillation ([facility]) 2022   • Brain tumor ([facility])     Benign and followed at    • Cancer ([facility])     cervical cancer, skin cancer   • CHF (congestive heart failure) ([facility]) 2021   • Chronic  kidney disease Related to diabetes   • Coronary artery disease 6/4/2021 DX for hear failure   • Diabetes mellitus (HCC) 30 years    Seeing Dr. Lam 1st time Aug 19   • GERD (gastroesophageal reflux disease)    • Gout    • History of staph infection 10/2021    right toe   • History of transfusion     no reactions, 1 unit, [facility]   • Hx of colonoscopy    • Hyperlipidemia Reference current labs x 2-3yrs approx   • Hypertension 30 years   • Insomnia    • Migraine    • Mitral valve disease    • Mitral valve disease    • Osteomyelitis ([facility])    • Peripheral neuropathy    • Sleep apnea    • Type 2 diabetes mellitus (HCC)     30 years        Past Surgical History:   Procedure Laterality Date   • ABDOMINAL HYSTERECTOMY W/SALPINGECTOMY     • AMPUTATION  Right great toe, 1st 1/3 metatarsal -Reg 4/14/21   • AORTAGRAM N/A 04/09/2021    Procedure: AORTAGRAM WITH OR WITHOUT RUNOFFS, WITH Co2;  Surgeon: Trey Forrest MD;  Location:  [facility];  Service: Vascular;  Laterality: N/A;   • AORTAGRAM N/A 09/28/2022    Procedure: CO2 ANGIOGRAM, LEFT SFA ANGIOPLASTY WITH DRUG ELUTING BALLOON, LEFT SFA STENT PLACEMENT ;  Surgeon: Trey Forrest MD;  Location:  [facility];  Service: Vascular;  Laterality: N/A;  FLUORO: 13.12  DOSE 162mGy  CONTRAST: Isovue 350: 15ml   • APPENDECTOMY     • BRAIN TUMOR EXCISION      laser surgery    • CARDIAC CATHETERIZATION N/A 06/21/2021    Procedure: LEFT HEART CATH;  Surgeon: Anjum Ceballos MD;  Location:  [facility];  Service: Cardiology;  Laterality: N/A;   • CATARACT EXTRACTION W/ INTRAOCULAR LENS  IMPLANT, BILATERAL     • CHOLECYSTECTOMY     • COLONOSCOPY     • ENDOSCOPY N/A 10/07/2022    Procedure: ESOPHAGOGASTRODUODENOSCOPY;  Surgeon: Stef Veras MD;  Location:  [facility];  Service: Gastroenterology;  Laterality: N/A;   • EYE SURGERY     • FEMORAL ARTERY STENT  10/2022   • HYSTERECTOMY     • INTERVENTIONAL RADIOLOGY PROCEDURE N/A 1/15/2023    Procedure: CV  INTERVENTIONAL RADIOLOGY PROCEDURE;  Surgeon: Sanket Zapata MD;  Location:  [facility];  Service: Interventional Radiology;  Laterality: N/A;   • TOE SURGERY     • TRANS METATARSAL AMPUTATION Right 04/14/2021    Procedure: AMPUTATION TRANS METATARSAL RIGHT GREAT TOE;  Surgeon: Valdez Finney MD;  Location:  [facility];  Service: Vascular;  Laterality: Right;         EVALUATION   PT Ortho     Row Name 03/24/23 1300       Subjective Comments    Subjective Comments Pt reports increased generalized pain, B heels very tender.  -JM       Subjective Pain    Able to rate subjective pain? yes  -JM    Pre-Treatment Pain Level 5  -JM    Post-Treatment Pain Level 5  -JM       Transfers    Comment, (Transfers) seated in electric w/c for tx  -JM          User Key  (r) = Recorded By, (t) = Taken By, (c) = Cosigned By    Initials Name Provider Type    Shi Mane PT Physical Therapist                 [facility] Wound     Row Name 03/24/23 1300             [REMOVED] Wound 03/10/23 1300 Left upper arm Skin Tear    Wound - Properties Group Placement Date: 03/10/23  - Placement Time: 1300  -JM Side: Left  - Orientation: upper  - Location: arm  - Primary Wound Type: Skin tear  -JM Removal Date: 03/24/23  - Wound Outcome: Healed  -JM    Dressing Appearance open to air  -      Base epithelialization;closed/resurfaced  -JM      Drainage Amount none  -JM      Retired Wound - Properties Group Placement Date: 03/10/23  - Placement Time: 1300  -JM Side: Left  - Orientation: upper  - Location: arm  - Primary Wound Type: Skin tear  -JM Removal Date: 03/24/23  - Wound Outcome: Healed  -JM    Retired Wound - Properties Group Date first assessed: 03/10/23  - Time first assessed: 1300  -JM Side: Left  - Location: arm  -JM Primary Wound Type: Skin tear  -JM Resolution Date: 03/24/23  - Wound Outcome: Healed  -JM       [REMOVED] Wound 03/03/23 1300 Left posterior wrist Skin Tear    Wound - Properties  Group Placement Date: 03/03/23  - Placement Time: 1300  -MC Present on Hospital Admission: N  -MC Side: Left  -MC Orientation: posterior  -MC Location: wrist  -MC Primary Wound Type: Skin tear  -MC Removal Date: 03/24/23  - Wound Outcome: Healed  -JM    Dressing Appearance dry;intact;no drainage  -JM      Base dry;epithelialization;closed/resurfaced  -JM      Drainage Amount none  -JM      Retired Wound - Properties Group Placement Date: 03/03/23  - Placement Time: 1300  -MC Present on Hospital Admission: N  -MC Side: Left  -MC Orientation: posterior  -MC Location: wrist  -MC Primary Wound Type: Skin tear  -MC Removal Date: 03/24/23  - Wound Outcome: Healed  -JM    Retired Wound - Properties Group Date first assessed: 03/03/23  - Time first assessed: 1300  -MC Present on Hospital Admission: N  -MC Side: Left  -MC Location: wrist  -MC Primary Wound Type: Skin tear  -MC Resolution Date: 03/24/23  - Wound Outcome: Healed  -JM       [REMOVED] Wound 02/24/23 1300 Right proximal leg Skin Tear    Wound - Properties Group Placement Date: 02/24/23  - Placement Time: 1300  -JM Side: Right  -JM Orientation: proximal  -JM Location: leg  -JM Primary Wound Type: Skin tear  -JM Removal Date: 03/24/23  - Wound Outcome: Healed  -JM    Dressing Appearance dry;intact;no drainage  -JM      Base clean;dry;closed/resurfaced;epithelialization  -JM      Drainage Amount none  -JM      Retired Wound - Properties Group Placement Date: 02/24/23  - Placement Time: 1300  -JM Side: Right  -JM Orientation: proximal  -JM Location: leg  -JM Primary Wound Type: Skin tear  -JM Removal Date: 03/24/23  - Wound Outcome: Healed  -JM    Retired Wound - Properties Group Date first assessed: 02/24/23  - Time first assessed: 1300  -JM Side: Right  -JM Location: leg  -JM Primary Wound Type: Skin tear  -JM Resolution Date: 03/24/23  - Wound Outcome: Healed  -JM       [REMOVED] Wound 02/17/23 0800 Left anterior leg Skin Tear    Wound -  "Properties Group Placement Date: 02/17/23  - Placement Time: 0800  -JM Side: Left  -JM Orientation: anterior  -JM Location: leg  -JM Primary Wound Type: Skin tear  -JM Removal Date: 03/24/23  - Wound Outcome: Healed  -JM    Retired Wound - Properties Group Placement Date: 02/17/23  - Placement Time: 0800  -JM Side: Left  -JM Orientation: anterior  -JM Location: leg  -JM Primary Wound Type: Skin tear  -JM Removal Date: 03/24/23  - Wound Outcome: Healed  -JM    Retired Wound - Properties Group Date first assessed: 02/17/23  - Time first assessed: 0800  -JM Side: Left  -JM Location: leg  -JM Primary Wound Type: Skin tear  -JM Resolution Date: 03/24/23  - Wound Outcome: Healed  -JM       Wound 03/15/23 1115 Left lateral foot Traumatic    Wound - Properties Group Placement Date: 03/15/23  - Placement Time: 1115  -MC Present on Hospital Admission: N  -MC Side: Left  -MC Orientation: lateral  -MC Location: foot  -MC Primary Wound Type: Traumatic  -MC    Dressing Appearance intact;dried drainage  -JM      Base dry;scab;yellow  -JM      Periwound intact;dry;pink  -JM      Periwound Temperature warm  -      Periwound Skin Turgor soft  -JM      Edges irregular;open  -JM      Drainage Characteristics/Odor serous  -JM      Drainage Amount scant  -JM      Care, Wound cleansed with;wound cleanser;debrided  -JM      Dressing Care foam;border dressing  2\" optifoam  -JM      Periwound Care cleansed with pH balanced cleanser;dry periwound area maintained  -JM      Retired Wound - Properties Group Placement Date: 03/15/23  - Placement Time: 1115  -MC Present on Hospital Admission: N  -MC Side: Left  -MC Orientation: lateral  -MC Location: foot  -MC Primary Wound Type: Traumatic  -MC    Retired Wound - Properties Group Date first assessed: 03/15/23  - Time first assessed: 1115  -MC Present on Hospital Admission: N  -MC Side: Left  - Location: foot  -MC Primary Wound Type: Traumatic  -MC       Wound 03/15/23 1115 " "Left proximal leg Skin Tear    Wound - Properties Group Placement Date: 03/15/23  - Placement Time: 1115  - Present on Hospital Admission: Y  -MC Side: Left  -MC Orientation: proximal  -MC Location: leg  -MC Primary Wound Type: Skin tear  -MC    Wound Image Images linked: 1  -JM      Dressing Appearance intact;moist drainage  -      Base clean;granulating;red;yellow;slough  -      Periwound intact;dry;pink;swelling  -      Periwound Temperature warm  -      Periwound Skin Turgor soft  -      Edges open  -      Drainage Characteristics/Odor serosanguineous  -      Drainage Amount small  -      Care, Wound cleansed with;wound cleanser;debrided  -      Dressing Care dressing applied;antimicrobial agent applied;foam;border dressing;multi-layer wrap  HFBr, 4\" optifoam, MLW  -      Periwound Care cleansed with pH balanced cleanser;dry periwound area maintained  -      Retired Wound - Properties Group Placement Date: 03/15/23  - Placement Time: 1115  - Present on Hospital Admission: Y  -MC Side: Left  -MC Orientation: proximal  -MC Location: leg  -MC Primary Wound Type: Skin tear  -MC    Retired Wound - Properties Group Date first assessed: 03/15/23  - Time first assessed: 1115  - Present on Hospital Admission: Y  -MC Side: Left  -MC Location: leg  -MC Primary Wound Type: Skin tear  -MC       Wound 01/28/23 1613 Left posterior heel Pressure Injury    Wound - Properties Group Placement Date: 01/28/23  -WR Placement Time: 1613  -WR Present on Hospital Admission: Y  -WR Side: Left  -WR Orientation: posterior  -WR Location: heel  -WR Primary Wound Type: Pressure inj  -WR    Wound Image Images linked: 1  -JM      Dressing Appearance intact;moist drainage  -      Base moist;necrotic;yellow;slough;pink;red;granulating  moist fibrous slough, scant granulation budding noted at periphery  -      Periwound intact;blanchable;pink;macerated  min maceration  -      Periwound Temperature warm  -  " "    Periwound Skin Turgor soft  -      Edges open  -JM      Wound Length (cm) 1.5 cm  -JM      Wound Width (cm) 1.7 cm  -JM      Wound Depth (cm) --  obscured  -JM      Wound Surface Area (cm^2) 2.55 cm^2  -JM      Drainage Characteristics/Odor serosanguineous;yellow  -JM      Drainage Amount small  -JM      Care, Wound cleansed with;wound cleanser;debrided;ultrasound therapy, non contact low frequency  MIST with vashe x5min  -JM      Dressing Care dressing applied;collagen;antimicrobial agent applied;foam;border dressing;multi-layer wrap  carolee, HFBr, 6\" optifoam, MLW  -JM      Periwound Care cleansed with pH balanced cleanser;dry periwound area maintained  -JM      Retired Wound - Properties Group Placement Date: 01/28/23  -WR Placement Time: 1613  -WR Present on Hospital Admission: Y  -WR Side: Left  -WR Orientation: posterior  -WR Location: heel  -WR Primary Wound Type: Pressure inj  -WR    Retired Wound - Properties Group Date first assessed: 01/28/23  -WR Time first assessed: 1613  -WR Present on Hospital Admission: Y  -WR Side: Left  -WR Location: heel  -WR Primary Wound Type: Pressure inj  -WR          User Key  (r) = Recorded By, (t) = Taken By, (c) = Cosigned By    Initials Name Provider Type    Lucila Mohan, PT Physical Therapist    Shi Mane, PT Physical Therapist    WR Aditi Jeter, RN Registered Nurse               Lymphedema     Row Name 03/24/23 1300             Lymphedema Edema Assessment    Ptting Edema Category By severity  -      Pitting Edema Moderate  -         Skin Changes/Observations    Location/Assessment Lower Extremity  -      Lower Extremity Conditions bilateral:;clean;dry;shiny;fragile;weeping  -      Lower Extremity Color/Pigment bilateral:;blanchable;hyperpigmented;other (comment)  areas of ecchymosis distal R foot and 4th toe  -         Lymphedema Pulses/Capillary Refill    Lower Extremity Capillary Refill right:;left:;less than 3 seconds  -   "       Compression/Skin Care    Compression/Skin Care skin care;wrapping location;bandaging  -      Skin Care washed/dried;lotion applied  -      Wrapping Location lower extremity  -      Wrapping Location LE bilateral:;foot to knee  -      Wrapping Comments size 4 compressogrips doubled distal 1/2 for gradient compression  -            User Key  (r) = Recorded By, (t) = Taken By, (c) = Cosigned By    Initials Name Provider Type    Shi Mane, PT Physical Therapist                WOUND DEBRIDEMENT  Total area of Debridement: 2cmsq  Debridement Site 1  Location- Site 1: L heel  Selective Debridement- Site 1: Wound Surface <20cmsq  Instruments- Site 1: tweezers, #15, scapel  Excised Tissue Description- Site 1: other (comment) (too painful for selective debridement today)   Debridement Site 2  Location- Site 2: lat LLE wound  Selective Debridement- Site 2: Wound Surface <20cmsq  Instruments- Site 2: tweezers  Excised Tissue Description- Site 2: moderate, slough  Bleeding- Site 2: scant          Therapy Education     Row Name 03/24/23 1300             Therapy Education    Education Details Continue dressings to L heel and lateral L calf, but may leave closed skin tears ARTUR.  Continue to use compression daily for BLE edema.  -VERONICA      Given Symptoms/condition management;Bandaging/dressing change  -VERONICA      Program Reinforced;Modified;Progressed  -VERONICA      How Provided Verbal;Demonstration  -VERONICA      Provided to Patient;Caregiver  -VERONICA      Level of Understanding Verbalized;Teach back education performed  -VERONICA            User Key  (r) = Recorded By, (t) = Taken By, (c) = Cosigned By    Initials Name Provider Type    Shi Mane, PT Physical Therapist                Recommendation and Plan   PT Assessment/Plan     Row Name 03/24/23 1300          PT Assessment    Functional Limitations Performance in self-care ADL;Impaired gait;Other (comment)  wound management limitations  -VERONICA     Impairments  Integumentary integrity;Pain;Impaired sensory integrity  -     Assessment Comments RLE and LUE skin tears resolved.  L heel wound unchanged, and too painful for selective debridement today, so PT continued with MIST to assist with debridement and increase local blood flow to stimulate granulation of wound.  Lat LLE wound improving but with thin slough today that PT was able to selectively debride.  BLE edema overall increased as pt reports not placing compressogrips this AM.  PT reinforced continue dressing changes and compression.  -        PT Plan    PT Frequency 2x/week  -     Physical Therapy Interventions (Optional Details) patient/family education;wound care  -     PT Plan Comments MIST, debridement, MLW  -           User Key  (r) = Recorded By, (t) = Taken By, (c) = Cosigned By    Initials Name Provider Type    Shi Mane, PT Physical Therapist                Goals   PT OP Goals     Row Name 03/24/23 1300          Time Calculation    PT Goal Re-Cert Due Date 05/18/23  -           User Key  (r) = Recorded By, (t) = Taken By, (c) = Cosigned By    Initials Name Provider Type    Shi Mane, PT Physical Therapist                PT Goal Re-Cert Due Date: 05/18/23            Time Calculation: Start Time: 1300  Untimed Charges  46222-Jgfhvqymgj comp below knee: 10  77049-Iuyizpzun debridement: 15  01463-HLXS Mist: 10  Total Minutes  Untimed Charges Total Minutes: 35   Total Minutes: 35  Therapy Charges for Today     Code Description Service Date Service Provider Modifiers Qty    07248001071 HC PIPE DEBRIDE OPEN WOUND UP TO 20CM 3/24/2023 Shi Cordoba, PT GP 1    [id number] HC PT NLFU MIST 3/24/2023 Shi Cordoba, PT GP 1    [id number]  PT MULTI LAYER COMP SYS BELOW KNEE 3/24/2023 Shi Cordoba, PT GP 1                  Shi Cordoba, PT  3/24/2023

## 2023-03-25 LAB
BACTERIA UR QL AUTO: ABNORMAL /HPF
HYALINE CASTS UR QL AUTO: ABNORMAL /LPF
RBC # UR STRIP: ABNORMAL /HPF
REF LAB TEST METHOD: ABNORMAL
SQUAMOUS #/AREA URNS HPF: ABNORMAL /HPF
WBC # UR STRIP: ABNORMAL /HPF

## 2023-03-26 ENCOUNTER — PATIENT MESSAGE (OUTPATIENT)
Dept: INTERNAL MEDICINE | Facility: CLINIC | Age: 83
End: 2023-03-26
Payer: MEDICARE
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
## 2023-03-26 PROBLEM — R42 DIZZINESS: Status: RESOLVED | Noted: 2022-07-27 | Resolved: 2023-03-26

## 2023-03-26 PROBLEM — J18.9 PNEUMONIA DUE TO INFECTIOUS ORGANISM: Status: RESOLVED | Noted: 2021-06-04 | Resolved: 2023-03-26

## 2023-03-26 PROBLEM — U07.1 COVID-19: Status: RESOLVED | Noted: 2023-01-28 | Resolved: 2023-03-26

## 2023-03-26 PROBLEM — R55 SYNCOPE, UNSPECIFIED SYNCOPE TYPE: Status: RESOLVED | Noted: 2022-10-06 | Resolved: 2023-03-26

## 2023-03-26 PROBLEM — E83.42 HYPOMAGNESEMIA: Status: RESOLVED | Noted: 2022-10-06 | Resolved: 2023-03-26

## 2023-03-26 PROBLEM — R53.1 WEAKNESS: Status: RESOLVED | Noted: 2022-07-27 | Resolved: 2023-03-26

## 2023-03-26 PROBLEM — M25.461 EFFUSION OF RIGHT KNEE: Status: RESOLVED | Noted: 2022-08-12 | Resolved: 2023-03-26

## 2023-03-26 PROBLEM — A41.9 SEPSIS: Status: RESOLVED | Noted: 2023-01-28 | Resolved: 2023-03-26

## 2023-03-26 PROBLEM — L89.159 PRESSURE INJURY OF SKIN OF SACRAL REGION: Status: RESOLVED | Noted: 2022-08-21 | Resolved: 2023-03-26

## 2023-03-26 PROBLEM — R53.81 PHYSICAL DECONDITIONING: Status: RESOLVED | Noted: 2022-05-17 | Resolved: 2023-03-26

## 2023-03-27 ENCOUNTER — TELEPHONE (OUTPATIENT)
Dept: CARDIAC SURGERY | Facility: CLINIC | Age: 83
End: 2023-03-27
Payer: MEDICARE

## 2023-03-27 RX ORDER — NITROFURANTOIN 25; 75 MG/1; MG/1
100 CAPSULE ORAL 2 TIMES DAILY
Qty: 10 CAPSULE | Refills: 0 | Status: SHIPPED | OUTPATIENT
Start: 2023-03-27

## 2023-03-27 NOTE — TELEPHONE ENCOUNTER
Called and spoke with Ms. Anderson.  There was question about obtaining an arterial duplex at visit on 3/22/2023.  I then received a message from PT in regards to arterial duplex on 3/24/2023.  Patient with slow healing ulcer that has become chronic in nature at this point.  She is following with wound care.  I reviewed with Dr. Forrest.  He agreed based on assessment findings that proceeding with an arterial duplex was not necessary.  I discussed this with the patient.  She agreed.  We discussed worsening symptomology and she was instructed to call our office if symptoms occurred for further evaluation.  Please see note from 3/22/2023 for assessment and details.     Best Regards,  ..Electronically signed by INDIGO Townsend, 03/27/23, 11:55 AM EDT.

## 2023-03-27 NOTE — TELEPHONE ENCOUNTER
From: Susan Anderson  To: Arleen Doherty  Sent: 3/26/2023 2:59 PM EDT  Subject: UTI and labs    Scheduled for a follow up on Tuesday. Apparently I may have a UTI again. I’ve had burning when I pee since Thursday. It was really bothering me today so I told my daughter. We have some AMOX clav 875 left over from when Dr Pete so I started it today. I have enough thru Tuesday. I need a prescription for it called into [facility] (I cannot take sulfa based and it needs to very kidney friendly). Also when I am in Tuesday I want to have my lab work for creatinine ordered again please to verify that number or see if there is a change!    Thank you!!  Susan Anderson

## 2023-03-28 ENCOUNTER — HOSPITAL ENCOUNTER (OUTPATIENT)
Dept: PHYSICAL THERAPY | Facility: HOSPITAL | Age: 83
Setting detail: THERAPIES SERIES
Discharge: HOME | End: 2023-03-28
Payer: MEDICARE

## 2023-03-28 ENCOUNTER — LAB (OUTPATIENT)
Dept: LAB | Facility: HOSPITAL | Age: 83
End: 2023-03-28
Payer: MEDICARE

## 2023-03-28 ENCOUNTER — OFFICE VISIT (OUTPATIENT)
Dept: INTERNAL MEDICINE | Facility: CLINIC | Age: 83
End: 2023-03-28
Payer: MEDICARE

## 2023-03-28 VITALS
SYSTOLIC BLOOD PRESSURE: 130 MMHG | HEART RATE: 60 BPM | BODY MASS INDEX: 29.23 KG/M2 | WEIGHT: 165 LBS | HEIGHT: 63 IN | TEMPERATURE: 97.8 F | DIASTOLIC BLOOD PRESSURE: 80 MMHG | OXYGEN SATURATION: 97 %

## 2023-03-28 DIAGNOSIS — S91.302D OPEN WOUND OF LEFT HEEL, SUBSEQUENT ENCOUNTER: Primary | ICD-10-CM

## 2023-03-28 DIAGNOSIS — Z79.4 TYPE 2 DIABETES MELLITUS WITH HYPERGLYCEMIA, WITH LONG-TERM CURRENT USE OF INSULIN: ICD-10-CM

## 2023-03-28 DIAGNOSIS — S81.812A ISTAP TYPE 2 SKIN TEAR OF LEFT LOWER LEG: ICD-10-CM

## 2023-03-28 DIAGNOSIS — F51.01 PRIMARY INSOMNIA: ICD-10-CM

## 2023-03-28 DIAGNOSIS — G89.29 CHRONIC MIDLINE LOW BACK PAIN WITHOUT SCIATICA: ICD-10-CM

## 2023-03-28 DIAGNOSIS — N30.00 ACUTE CYSTITIS WITHOUT HEMATURIA: Primary | ICD-10-CM

## 2023-03-28 DIAGNOSIS — M54.50 CHRONIC MIDLINE LOW BACK PAIN WITHOUT SCIATICA: ICD-10-CM

## 2023-03-28 DIAGNOSIS — R60.0 BILATERAL LOWER EXTREMITY EDEMA: ICD-10-CM

## 2023-03-28 DIAGNOSIS — S91.302D OPEN WOUND OF LEFT FOOT, SUBSEQUENT ENCOUNTER: ICD-10-CM

## 2023-03-28 DIAGNOSIS — E11.65 TYPE 2 DIABETES MELLITUS WITH HYPERGLYCEMIA, WITH LONG-TERM CURRENT USE OF INSULIN: ICD-10-CM

## 2023-03-28 LAB
ALBUMIN SERPL-MCNC: 3.3 G/DL (ref 3.5–5.2)
ALBUMIN/GLOB SERPL: 1.2 G/DL
ALP SERPL-CCNC: 248 U/L (ref 39–117)
ALT SERPL W P-5'-P-CCNC: 34 U/L (ref 1–33)
ANION GAP SERPL CALCULATED.3IONS-SCNC: 10.2 MMOL/L (ref 5–15)
AST SERPL-CCNC: 36 U/L (ref 1–32)
BILIRUB SERPL-MCNC: 0.6 MG/DL (ref 0–1.2)
BUN SERPL-MCNC: 66 MG/DL (ref 8–23)
BUN/CREAT SERPL: 35.5 (ref 7–25)
CALCIUM SPEC-SCNC: 9.1 MG/DL (ref 8.6–10.5)
CHLORIDE SERPL-SCNC: 99 MMOL/L (ref 98–107)
CO2 SERPL-SCNC: 30.8 MMOL/L (ref 22–29)
CREAT SERPL-MCNC: 1.86 MG/DL (ref 0.57–1)
EGFRCR SERPLBLD CKD-EPI 2021: 26.4 ML/MIN/1.73
GLOBULIN UR ELPH-MCNC: 2.7 GM/DL
GLUCOSE SERPL-MCNC: 328 MG/DL (ref 65–99)
HBA1C MFR BLD: 8.3 % (ref 4.8–5.6)
POTASSIUM SERPL-SCNC: 4.9 MMOL/L (ref 3.5–5.2)
PROT SERPL-MCNC: 6 G/DL (ref 6–8.5)
SODIUM SERPL-SCNC: 140 MMOL/L (ref 136–145)

## 2023-03-28 PROCEDURE — 29581 APPL MULTLAYER CMPRN SYS LEG: CPT

## 2023-03-28 PROCEDURE — 3075F SYST BP GE 130 - 139MM HG: CPT | Performed by: INTERNAL MEDICINE

## 2023-03-28 PROCEDURE — 97597 DBRDMT OPN WND 1ST 20 CM/<: CPT

## 2023-03-28 PROCEDURE — 97610 LOW FREQUENCY NON-THERMAL US: CPT

## 2023-03-28 PROCEDURE — 99214 OFFICE O/P EST MOD 30 MIN: CPT | Performed by: INTERNAL MEDICINE

## 2023-03-28 PROCEDURE — 80053 COMPREHEN METABOLIC PANEL: CPT | Performed by: INTERNAL MEDICINE

## 2023-03-28 PROCEDURE — 3079F DIAST BP 80-89 MM HG: CPT | Performed by: INTERNAL MEDICINE

## 2023-03-28 PROCEDURE — 83036 HEMOGLOBIN GLYCOSYLATED A1C: CPT | Performed by: INTERNAL MEDICINE

## 2023-03-28 RX ORDER — QUETIAPINE FUMARATE 25 MG/1
25 TABLET, FILM COATED ORAL NIGHTLY
Qty: 30 TABLET | Refills: 3 | Status: SHIPPED | OUTPATIENT
Start: 2023-03-28 | End: 2023-04-11

## 2023-03-28 RX ORDER — INSULIN GLARGINE 100 [IU]/ML
35 INJECTION, SOLUTION SUBCUTANEOUS DAILY
Qty: 100 ML | Refills: 12 | Status: ON HOLD | OUTPATIENT
Start: 2023-03-28

## 2023-03-28 RX ORDER — TRAMADOL HYDROCHLORIDE 50 MG/1
50 TABLET ORAL EVERY 6 HOURS PRN
Qty: 30 TABLET | Refills: 0 | Status: ON HOLD | OUTPATIENT
Start: 2023-03-28

## 2023-03-28 NOTE — THERAPY WOUND CARE TREATMENT
Outpatient Rehabilitation - Wound/Debridement Treatment Note  [facility]     Patient Name: Susan Anderson  : 1940  MRN: [id number]  Today's Date: 3/28/2023                 Admit Date: 3/28/2023    Visit Dx:    ICD-10-CM ICD-9-CM   1. Open wound of left heel, subsequent encounter  S91.302D V58.89     892.0   2. Bilateral lower extremity edema  R60.0 782.3   3. Open wound of left foot, subsequent encounter  S91.302D V58.89     892.0   4. ISTAP type 2 skin tear of left lower leg  S81.812A 891.0     L heel        L lateral/proximal calf          Patient Active Problem List   Diagnosis   • Diabetic foot ulcer ([facility])   • PVD (peripheral vascular disease) ([facility])   • Osteomyelitis ([facility])   • Diabetes mellitus with neuropathy ([facility])   • Essential hypertension   • Stage 3a chronic kidney disease ([facility])   • Pyogenic inflammation of bone ([facility])   • Hyperglycemia   • Mitral valve disease   • NICM (nonischemic cardiomyopathy) ([facility])   • CAD   • Dyslipidemia   • Chronic combined systolic and diastolic heart failure    • Lumbar stenosis with neurogenic claudication   • Spondylosis of lumbar region without myelopathy or radiculopathy   • Spondylolisthesis, lumbar region   • Degeneration of lumbar or lumbosacral intervertebral disc   • Gait disturbance   • Sarcopenia   • Anemia   • Demand ischemia ([facility])   • Right knee pain   • Sleep apnea   • GIB (gastrointestinal bleeding)   • Vasovagal syncope   • Paroxysmal atrial fibrillation   • Ulcer of esophagus without bleeding   • T2DM         Past Medical History:   Diagnosis Date   • Anxiety    • Arthritis    • Asthma  - double pneumonia    Currently on inhaler and nebulizer   • Atrial fibrillation ([facility]) 2022   • Brain tumor ([facility])     Benign and followed at    • Cancer ([facility])     cervical cancer, skin cancer   • CHF (congestive heart failure) ([facility]) 2021   • Chronic kidney disease Related to diabetes   • Coronary artery disease 2021 DX for hear failure   •  COVID-19 1/28/2023   • Diabetes mellitus (HCC) 30 years    Seeing Dr. Lam 1st time Aug 19   • Dizziness 7/27/2022   • Effusion of right knee 8/12/2022   • Fall 7/27/2022   • GERD (gastroesophageal reflux disease)    • Gout    • History of staph infection 10/2021    right toe   • History of transfusion     no reactions, 1 unit, [facility]   • Hx of colonoscopy    • Hyperlipidemia Reference current labs x 2-3yrs approx   • Hypertension 30 years   • Hypomagnesemia 10/6/2022   • Insomnia    • Migraine    • Mitral valve disease    • Mitral valve disease    • Osteomyelitis (HCC)    • Peripheral neuropathy    • Physical deconditioning 5/17/2022   • Pneumonia due to infectious organism 6/4/2021   • Pressure injury of skin of sacral region 8/21/2022   • Sepsis (HCC) 1/28/2023   • Sleep apnea    • Syncope, unspecified syncope type 10/6/2022   • Type 2 diabetes mellitus (HCC)     30 years   • Weakness 7/27/2022        Past Surgical History:   Procedure Laterality Date   • ABDOMINAL HYSTERECTOMY W/SALPINGECTOMY     • AMPUTATION  Right great toe, 1st 1/3 metatarsal -Reg 4/14/21   • AORTAGRAM N/A 04/09/2021    Procedure: AORTAGRAM WITH OR WITHOUT RUNOFFS, WITH Co2;  Surgeon: Trey Forrest MD;  Location:  [facility];  Service: Vascular;  Laterality: N/A;   • AORTAGRAM N/A 09/28/2022    Procedure: CO2 ANGIOGRAM, LEFT SFA ANGIOPLASTY WITH DRUG ELUTING BALLOON, LEFT SFA STENT PLACEMENT ;  Surgeon: Trey Forrest MD;  Location:  [facility];  Service: Vascular;  Laterality: N/A;  FLUORO: 13.12  DOSE 162mGy  CONTRAST: Isovue 350: 15ml   • APPENDECTOMY     • BRAIN TUMOR EXCISION      laser surgery    • CARDIAC CATHETERIZATION N/A 06/21/2021    Procedure: LEFT HEART CATH;  Surgeon: Anjum Ceballos MD;  Location: [facility] CATH INVASIVE LOCATION;  Service: Cardiology;  Laterality: N/A;   • CATARACT EXTRACTION W/ INTRAOCULAR LENS  IMPLANT, BILATERAL     • CHOLECYSTECTOMY     • COLONOSCOPY     • ENDOSCOPY N/A 10/07/2022    Procedure:  ESOPHAGOGASTRODUODENOSCOPY;  Surgeon: Stef Veras MD;  Location: [facility] ENDOSCOPY;  Service: Gastroenterology;  Laterality: N/A;   • EYE SURGERY     • FEMORAL ARTERY STENT  10/2022   • HYSTERECTOMY     • INTERVENTIONAL RADIOLOGY PROCEDURE N/A 1/15/2023    Procedure: CV INTERVENTIONAL RADIOLOGY PROCEDURE;  Surgeon: Sanket Zapata MD;  Location:  [facility] CATH INVASIVE LOCATION;  Service: Interventional Radiology;  Laterality: N/A;   • TOE SURGERY     • TRANS METATARSAL AMPUTATION Right 04/14/2021    Procedure: AMPUTATION TRANS METATARSAL RIGHT GREAT TOE;  Surgeon: Valdez Finney MD;  Location:  [facility] OR;  Service: Vascular;  Laterality: Right;         EVALUATION   PT Ortho     Row Name 03/28/23 1300       Subjective Comments    Subjective Comments Pt reports she has felt unwell for the past 4 days. Reports she is continuing to experience considerable pain in bilateral heels. Reports her MD said he does not think that she needs any further vascular studies at this time.  -       Subjective Pain    Able to rate subjective pain? yes  -    Pre-Treatment Pain Level 5  -    Post-Treatment Pain Level 5  -       Transfers    Comment, (Transfers) seated in electric w/c for tx  -          User Key  (r) = Recorded By, (t) = Taken By, (c) = Cosigned By    Initials Name Provider Type     López Graham, PT Physical Therapist                 [facility] Wound     Row Name 03/28/23 1300             Wound 03/15/23 1115 Left lateral foot Traumatic    Wound - Properties Group Placement Date: 03/15/23  - Placement Time: 1115  - Present on Hospital Admission: N  - Side: Left  - Orientation: lateral  - Location: foot  - Primary Wound Type: Traumatic  -    Dressing Appearance intact;dry  -      Base dry;scab;yellow  pinpoint open area remaining  -      Periwound intact;dry;pink  -      Periwound Temperature warm  -      Periwound Skin Turgor soft  -      Edges irregular;open  -      Wound Length (cm)  "0.1 cm  -      Wound Width (cm) 0.1 cm  -      Wound Depth (cm) 0.1 cm  -LH      Wound Surface Area (cm^2) 0.01 cm^2  -LH      Wound Volume (cm^3) 0.001 cm^3  -      Drainage Amount none  -      Care, Wound cleansed with;wound cleanser;debrided  -      Dressing Care foam;border dressing  2\" optifoam  -LH      Periwound Care cleansed with pH balanced cleanser;dry periwound area maintained  -      Retired Wound - Properties Group Placement Date: 03/15/23  - Placement Time: 1115  - Present on Hospital Admission: N  - Side: Left  - Orientation: lateral  - Location: foot  - Primary Wound Type: Traumatic  -MC    Retired Wound - Properties Group Date first assessed: 03/15/23  - Time first assessed: 1115  - Present on Hospital Admission: N  - Side: Left  - Location: foot  - Primary Wound Type: Traumatic  -MC       Wound 03/15/23 1115 Left proximal leg Skin Tear    Wound - Properties Group Placement Date: 03/15/23  - Placement Time: 1115  - Present on Hospital Admission: Y  - Side: Left  - Orientation: proximal  - Location: leg  - Primary Wound Type: Skin tear  -    Wound Image Images linked: 1  -      Dressing Appearance intact;moist drainage  -      Base clean;granulating;red;yellow;slough  -      Periwound intact;dry;pink;swelling  -      Periwound Temperature warm  -      Periwound Skin Turgor soft  -      Edges open  -      Wound Length (cm) 1.4 cm  -      Wound Width (cm) 0.5 cm  -      Wound Depth (cm) 0.1 cm  -      Wound Surface Area (cm^2) 0.7 cm^2  -LH      Wound Volume (cm^3) 0.07 cm^3  -      Drainage Characteristics/Odor serosanguineous  -      Drainage Amount small  -      Care, Wound cleansed with;wound cleanser;debrided  -      Dressing Care dressing applied;antimicrobial agent applied;foam;border dressing;multi-layer wrap  HFBr, 4\" optifoam, MLW  -LH      Periwound Care cleansed with pH balanced cleanser;dry periwound area " "maintained  -      Retired Wound - Properties Group Placement Date: 03/15/23  - Placement Time: 1115  -MC Present on Hospital Admission: Y  -MC Side: Left  -MC Orientation: proximal  -MC Location: leg  -MC Primary Wound Type: Skin tear  -MC    Retired Wound - Properties Group Date first assessed: 03/15/23  - Time first assessed: 1115  -MC Present on Hospital Admission: Y  -MC Side: Left  -MC Location: leg  -MC Primary Wound Type: Skin tear  -MC       Wound 01/28/23 1613 Left posterior heel Pressure Injury    Wound - Properties Group Placement Date: 01/28/23  -WR Placement Time: 1613 -WR Present on Hospital Admission: Y  -WR Side: Left  -WR Orientation: posterior  -WR Location: heel  -WR Primary Wound Type: Pressure inj  -WR    Wound Image Images linked: 1  -      Dressing Appearance intact;moist drainage  -LH      Base moist;necrotic;yellow;slough;pink;red;granulating  moist fibrous slough, scant granulation budding noted at periphery  -      Periwound intact;blanchable;pink;macerated  min maceration  -      Periwound Temperature warm  -      Periwound Skin Turgor soft  -      Edges open  -      Wound Length (cm) 1.5 cm  -      Wound Width (cm) 1.8 cm  -      Wound Depth (cm) --  obscured  -      Wound Surface Area (cm^2) 2.7 cm^2  -      Drainage Characteristics/Odor serosanguineous;yellow  -      Drainage Amount small  -      Care, Wound cleansed with;wound cleanser;debrided;ultrasound therapy, non contact low frequency  MIST with vashe x5min  -      Dressing Care dressing applied;collagen;antimicrobial agent applied;foam;border dressing;multi-layer wrap  carolee, HFBr, 6\" optifoam, MLW  -      Periwound Care cleansed with pH balanced cleanser;dry periwound area maintained  -      Retired Wound - Properties Group Placement Date: 01/28/23  -WR Placement Time: 1613 -WR Present on Hospital Admission: Y  -WR Side: Left  -WR Orientation: posterior  -WR Location: heel  -WR Primary " Wound Type: Pressure inj  -WR    Retired Wound - Properties Group Date first assessed: 01/28/23  -WR Time first assessed: 1613  -WR Present on Hospital Admission: Y  -WR Side: Left  -WR Location: heel  -WR Primary Wound Type: Pressure inj  -WR          User Key  (r) = Recorded By, (t) = Taken By, (c) = Cosigned By    Initials Name Provider Type    Lucila Mohan, PT Physical Therapist    WR Aditi Jeter RN Registered Nurse     López Graham, PT Physical Therapist               Lymphedema     Row Name 03/28/23 1300             Lymphedema Edema Assessment    Ptting Edema Category By severity  -      Pitting Edema Moderate  -         Skin Changes/Observations    Location/Assessment Lower Extremity  -      Lower Extremity Conditions bilateral:;clean;dry;shiny;fragile  -      Lower Extremity Color/Pigment bilateral:;blanchable;hyperpigmented;other (comment)  areas of ecchymosis distal R foot and 4th toe  -         Lymphedema Pulses/Capillary Refill    Lower Extremity Capillary Refill right:;left:;less than 3 seconds  -         Compression/Skin Care    Compression/Skin Care skin care;wrapping location;bandaging  -      Skin Care washed/dried;lotion applied  -      Wrapping Location lower extremity  -      Wrapping Location LE bilateral:;foot to knee  -      Wrapping Comments size 4 compressogrips doubled distal 1/2 for gradient compression  -            User Key  (r) = Recorded By, (t) = Taken By, (c) = Cosigned By    Initials Name Provider Type    López Fuller, PT Physical Therapist                WOUND DEBRIDEMENT  Total area of Debridement: 2cm2  Debridement Site 1  Location- Site 1: L heel  Selective Debridement- Site 1: Wound Surface <20cmsq  Instruments- Site 1: tweezers, #15, scapel  Excised Tissue Description- Site 1: minimum, slough, other (comment) (limited by pain)   Debridement Site 2  Location- Site 2: lat LLE wound  Selective Debridement- Site 2: Wound Surface  <20cmsq  Instruments- Site 2: tweezers  Excised Tissue Description- Site 2: moderate, slough  Bleeding- Site 2: scant          Therapy Education     Row Name 03/28/23 1400             Therapy Education    Education Details Reinforced importance of BLE compression especially throughout the day when the legs are in a dependent position.  -      Given Symptoms/condition management;Bandaging/dressing change  -      Program Reinforced;Modified;Progressed  -      How Provided Verbal;Demonstration  -      Provided to Patient;Caregiver  -      Level of Understanding Verbalized;Teach back education performed  -            User Key  (r) = Recorded By, (t) = Taken By, (c) = Cosigned By    Initials Name Provider Type     López Graham, PT Physical Therapist                Recommendation and Plan   PT Assessment/Plan     Row Name 03/28/23 1400          PT Assessment    Functional Limitations Performance in self-care ADL;Impaired gait;Other (comment)  wound management limitations  -     Impairments Integumentary integrity;Pain;Impaired sensory integrity  -     Assessment Comments Pt continuing to demonstrate moderate/severe BLE pitting edema as pt/caregiver reports not placing MLW since last night. Pt's L lateral leg continuing to require debridement of thin layer of slough to reveal red granulation tissue. Pt's L heel wound continuing with a majority of the wound base covered in fibrous necrotic tissue although selective debridement limited d/t reports of pain. Pt's R heel now with small open fissured area. Per CT surgery, an arterial duplex is not necessary at this time.  Pt would continue to benefit from further debridment, MIST, and MLW to help promote wound healing.  -     Rehab Potential Fair  -     Patient/caregiver participated in establishment of treatment plan and goals Yes  -     Patient would benefit from skilled therapy intervention Yes  -        PT Plan    PT Frequency 2x/week  -      Physical Therapy Interventions (Optional Details) wound care;patient/family education  -     PT Plan Comments MIST, debridement, MLW  -           User Key  (r) = Recorded By, (t) = Taken By, (c) = Cosigned By    Initials Name Provider Type     López Graham, PT Physical Therapist                Goals   PT OP Goals     Row Name 03/28/23 1409          Time Calculation    PT Goal Re-Cert Due Date 05/18/23  -           User Key  (r) = Recorded By, (t) = Taken By, (c) = Cosigned By    Initials Name Provider Type     López Graham, PT Physical Therapist                PT Goal Re-Cert Due Date: 05/18/23            Time Calculation: Start Time: 1300  Untimed Charges  42870-Jceluhcvtd comp below knee: 10  66533-Ywgcrjsaz debridement: 15  38921-PIRF Mist: 10  Total Minutes  Untimed Charges Total Minutes: 35   Total Minutes: 35  Therapy Charges for Today     Code Description Service Date Service Provider Modifiers Qty    36784855923 HC PIPE DEBRIDE OPEN WOUND UP TO 20CM 3/28/2023 López Graham, PT GP 1    [id number] HC PT NLFU MIST 3/28/2023 López Graham, PT GP 1    83113550549 HC PT MULTI LAYER COMP SYS BELOW KNEE 3/28/2023 López Graham, PT GP 1                  óLpez Graham PT  3/28/2023

## 2023-03-28 NOTE — PROGRESS NOTES
Office Note      Date: 2023  Patient Name: Susan Anderson  MRN: [id number]  : 1940    Chief Complaint   Patient presents with   • Follow-up     6 week recheck   • medication concern     Was given Macrobid that has now caused her to have diarrhea.    Patient would like to go bad to the Lantus vial instead of the pen due to her having to pay extra for needles.     Patient would also like to get a tramadol for her pain.   • Insomnia       History of Present Illness: Susan Anderson is a 83 y.o. female who presents for Follow-up (6 week recheck), medication concern (Was given Macrobid that has now caused her to have diarrhea.//Patient would like to go bad to the Lantus vial instead of the pen due to her having to pay extra for needles. //Patient would also like to get a tramadol for her pain.), and Insomnia.  Recently treated for UTI.  Stools are soft.  Would like to switch to insulin vials.  Would like refill of tramadol for chronic low back pain.  Having difficulty sleeping.    Subjective      Review of Systems:   Pertinent review of systems per HPI.    Review of Systems   Constitutional: Negative for activity change, appetite change, chills, diaphoresis, fatigue, fever and unexpected weight change.   HENT: Negative for congestion, dental problem, drooling, ear discharge, ear pain, facial swelling, hearing loss and mouth sores.    Eyes: Negative for pain, discharge and itching.   Respiratory: Negative for apnea, cough, choking, chest tightness and shortness of breath.    Cardiovascular: Negative for chest pain, palpitations and leg swelling.   Gastrointestinal: Positive for diarrhea. Negative for abdominal distention, abdominal pain, blood in stool and constipation.   Endocrine: Negative for cold intolerance, heat intolerance, polydipsia and polyuria.   Genitourinary: Negative for difficulty urinating, dysuria, frequency and hematuria.   Musculoskeletal: Positive for back pain.   Skin: Negative for  "color change, pallor, rash and wound.   Allergic/Immunologic: Negative for environmental allergies, food allergies and immunocompromised state.   Neurological: Negative for dizziness, weakness and light-headedness.   Psychiatric/Behavioral: Negative for agitation, behavioral problems, confusion, decreased concentration and self-injury. The patient is not nervous/anxious.    All other systems reviewed and are negative.    Allergies   Allergen Reactions   • Vancomycin Other (See Comments)     Acute Kidney Injury, requested by ID   • Baclofen Other (See Comments) and Hallucinations     PSYCHOSIS-POA REFUSES ADMINISTRATION OF THIS MED.   • Cephalexin Nausea Only   • Erythromycin Base Nausea Only   • Melatonin Other (See Comments)     Nightmares   • Oxycodone Nausea Only   • Protonix [Pantoprazole] Itching and Rash   • Sulfa Antibiotics Nausea Only       Objective     Physical Exam:  Vital Signs:   Vitals:    03/28/23 1547   BP: 130/80   Pulse: 60   Temp: 97.8 °F (36.6 °C)   SpO2: 97%   Weight: 74.8 kg (165 lb)  Comment: patient report   Height: 160 cm (63\")   PainSc:   4   PainLoc: Foot      Body mass index is 29.23 kg/m².    Physical Exam  Vitals and nursing note reviewed.   Constitutional:       General: She is not in acute distress.     Appearance: She is well-developed.   HENT:      Head: Normocephalic and atraumatic.      Right Ear: External ear normal.      Left Ear: External ear normal.   Eyes:      General: No scleral icterus.        Right eye: No discharge.         Left eye: No discharge.      Conjunctiva/sclera: Conjunctivae normal.   Cardiovascular:      Rate and Rhythm: Normal rate and regular rhythm.      Heart sounds: Normal heart sounds. No murmur heard.    No friction rub. No gallop.   Pulmonary:      Effort: Pulmonary effort is normal. No respiratory distress.      Breath sounds: Normal breath sounds. No wheezing or rales.   Skin:     General: Skin is warm and dry.      Coloration: Skin is not pale. "         Assessment / Plan      Assessment & Plan:    1. Type 2 diabetes mellitus with hyperglycemia, with long-term current use of insulin  Chronic medical issue, refills given, follow-up with endocrinology for elevated A1c  - insulin lispro (HumaLOG) 100 UNIT/ML injection; 3 units tid + Sliding Scale Before Meals:  6 units- 200-250  8 units- 251-300  10 units- 301-350  12 units - 351-400  Dispense: 30 mL; Refill: 3  - Comprehensive Metabolic Panel  - Hemoglobin A1c  - insulin glargine (Lantus) 100 UNIT/ML injection; Inject 35 Units under the skin into the appropriate area as directed Daily.  Dispense: 100 mL; Refill: 12    2. Acute cystitis without hematuriah  Has multiple drug allergies, advised probiotic and complete course of antibiotics  3. Primary insomnia  Initially prescribed Seroquel however we stopped this per nephrology request    4. Chronic midline low back pain without sciatica  Chronic medical issue, refills given  - traMADol (ULTRAM) 50 MG tablet; Take 1 tablet by mouth Every 6 (Six) Hours As Needed for Moderate Pain.  Dispense: 30 tablet; Refill: 0      Arleen Doherty MD  03/28/2023   Answers for HPI/ROS submitted by the patient on 3/21/2023  Please describe your symptoms.: Fu to hospital discharge and UTI, Foot wound (under care with [facility]), Occasional shortness of breath, Important: Need labs performed - updates and also order in hand from Dr Cecil Neff (nephrologist) for follow up with him 4/3  Have you had these symptoms before?: Yes  How long have you been having these symptoms?: Greater than 2 weeks  What is the primary reason for your visit?: Other

## 2023-03-29 ENCOUNTER — PRIOR AUTHORIZATION (OUTPATIENT)
Dept: INTERNAL MEDICINE | Facility: CLINIC | Age: 83
End: 2023-03-29
Payer: MEDICARE

## 2023-03-31 ENCOUNTER — HOSPITAL ENCOUNTER (OUTPATIENT)
Dept: PHYSICAL THERAPY | Facility: HOSPITAL | Age: 83
Setting detail: THERAPIES SERIES
Discharge: HOME | End: 2023-03-31
Payer: MEDICARE

## 2023-03-31 DIAGNOSIS — R60.0 BILATERAL LOWER EXTREMITY EDEMA: ICD-10-CM

## 2023-03-31 DIAGNOSIS — S91.302D OPEN WOUND OF LEFT HEEL, SUBSEQUENT ENCOUNTER: Primary | ICD-10-CM

## 2023-03-31 DIAGNOSIS — S81.812A ISTAP TYPE 2 SKIN TEAR OF LEFT LOWER LEG: ICD-10-CM

## 2023-03-31 DIAGNOSIS — S91.301D OPEN WOUND OF RIGHT HEEL, SUBSEQUENT ENCOUNTER: ICD-10-CM

## 2023-03-31 PROCEDURE — 97597 DBRDMT OPN WND 1ST 20 CM/<: CPT

## 2023-03-31 PROCEDURE — 97610 LOW FREQUENCY NON-THERMAL US: CPT

## 2023-03-31 PROCEDURE — 29581 APPL MULTLAYER CMPRN SYS LEG: CPT

## 2023-03-31 NOTE — THERAPY WOUND CARE TREATMENT
Outpatient Rehabilitation - Wound/Debridement Treatment Note  [facility]     Patient Name: Susan Anderson  : 1940  MRN: [id number]  Today's Date: 3/31/2023                 Admit Date: 3/31/2023    Visit Dx:    ICD-10-CM ICD-9-CM   1. Open wound of left heel, subsequent encounter  S91.302D V58.89     892.0   2. Open wound of right heel, subsequent encounter  S91.301D V58.89     892.0   3. Bilateral lower extremity edema  R60.0 782.3   4. ISTAP type 2 skin tear of left lower leg  S81.812A 891.0       Patient Active Problem List   Diagnosis   • Diabetic foot ulcer ([facility])   • PVD (peripheral vascular disease) ([facility])   • Osteomyelitis ([facility])   • Diabetes mellitus with neuropathy ([facility])   • Essential hypertension   • Stage 3a chronic kidney disease ([facility])   • Pyogenic inflammation of bone ([facility])   • Hyperglycemia   • Mitral valve disease   • NICM (nonischemic cardiomyopathy) ([facility])   • CAD   • Dyslipidemia   • Chronic combined systolic and diastolic heart failure    • Lumbar stenosis with neurogenic claudication   • Spondylosis of lumbar region without myelopathy or radiculopathy   • Spondylolisthesis, lumbar region   • Degeneration of lumbar or lumbosacral intervertebral disc   • Gait disturbance   • Sarcopenia   • Anemia   • Demand ischemia ([facility])   • Right knee pain   • Sleep apnea   • GIB (gastrointestinal bleeding)   • Vasovagal syncope   • Paroxysmal atrial fibrillation   • Ulcer of esophagus without bleeding   • T2DM         Past Medical History:   Diagnosis Date   • Anemia    • Anxiety    • Arthritis    • Asthma / - double pneumonia    Currently on inhaler and nebulizer   • Atrial fibrillation ([facility]) 2022   • Brain tumor ([facility])     Benign and followed at    • Cancer ([facility])     cervical cancer, skin cancer   • CHF (congestive heart failure) ([facility]) 2021   • Chronic kidney disease Related to diabetes   • Clotting disorder ([facility]) 10/22    Upper GI bleed from blood thinners aggravate ulcers   •  Coronary artery disease 6/4/2021 DX for hear failure   • COVID-19 01/28/2023   • Depression 8/22   • Diabetes mellitus (HCC) 30 years    Seeing Dr. Lam 1st time Aug 19   • Dizziness 07/27/2022   • Effusion of right knee 08/12/2022   • Fall 07/27/2022   • GERD (gastroesophageal reflux disease)    • Gout    • History of medical problems 8/22    AFIB   • History of staph infection 10/2021    right toe   • History of transfusion     no reactions, 1 unit, [facility]   • HL (hearing loss)     Acoustic neuroma right   • Hx of colonoscopy    • Hyperlipidemia Reference current labs x 2-3yrs approx   • Hypertension 30 years   • Hypomagnesemia 10/06/2022   • Insomnia    • Low back pain    • Migraine    • Mitral valve disease    • Mitral valve disease    • Osteomyelitis (HCC)    • Peptic ulceration 10/22    Secondary to blood thinners   • Peripheral neuropathy    • Physical deconditioning 05/17/2022   • Pneumonia due to infectious organism 06/04/2021   • Pressure injury of skin of sacral region 08/21/2022   • Renal insufficiency 2021   • Sepsis (HCC) 01/28/2023   • Sleep apnea    • Syncope, unspecified syncope type 10/06/2022   • Type 2 diabetes mellitus (HCC)     30 years   • Weakness 07/27/2022        Past Surgical History:   Procedure Laterality Date   • ABDOMINAL HYSTERECTOMY W/SALPINGECTOMY     • AMPUTATION  Right great toe, 1st 1/3 metatarsal -Reg 4/14/21   • AORTAGRAM N/A 04/09/2021    Procedure: AORTAGRAM WITH OR WITHOUT RUNOFFS, WITH Co2;  Surgeon: Trey Forrest MD;  Location: [facility];  Service: Vascular;  Laterality: N/A;   • AORTAGRAM N/A 09/28/2022    Procedure: CO2 ANGIOGRAM, LEFT SFA ANGIOPLASTY WITH DRUG ELUTING BALLOON, LEFT SFA STENT PLACEMENT ;  Surgeon: Trey Forrest MD;  Location: [facility];  Service: Vascular;  Laterality: N/A;  FLUORO: 13.12  DOSE 162mGy  CONTRAST: Isovue 350: 15ml   • APPENDECTOMY     • BRAIN TUMOR EXCISION      laser surgery    • CARDIAC CATHETERIZATION N/A 06/21/2021     Procedure: LEFT HEART CATH;  Surgeon: Anjum Ceballos MD;  Location:  [facility];  Service: Cardiology;  Laterality: N/A;   • CATARACT EXTRACTION W/ INTRAOCULAR LENS  IMPLANT, BILATERAL     • CHOLECYSTECTOMY     • COLONOSCOPY     • ENDOSCOPY N/A 10/07/2022    Procedure: ESOPHAGOGASTRODUODENOSCOPY;  Surgeon: Stef Veras MD;  Location:  [facility];  Service: Gastroenterology;  Laterality: N/A;   • EYE SURGERY     • FEMORAL ARTERY STENT  10/2022   • HYSTERECTOMY     • INTERVENTIONAL RADIOLOGY PROCEDURE N/A 1/15/2023    Procedure: CV INTERVENTIONAL RADIOLOGY PROCEDURE;  Surgeon: Sanket Zapata MD;  Location:  [facility];  Service: Interventional Radiology;  Laterality: N/A;   • TOE SURGERY     • TRANS METATARSAL AMPUTATION Right 04/14/2021    Procedure: AMPUTATION TRANS METATARSAL RIGHT GREAT TOE;  Surgeon: Valdez Finney MD;  Location:  [facility];  Service: Vascular;  Laterality: Right;         EVALUATION   PT Ortho     Row Name 03/31/23 1345       Subjective Comments    Subjective Comments Pt reports less pain in L heel since last tx, but increased pain in R heel today.  -       Subjective Pain    Able to rate subjective pain? yes  -    Pre-Treatment Pain Level 5  -    Post-Treatment Pain Level 5  -       Transfers    Comment, (Transfers) seated in electric w/c for tx, wedge and pillows for acces to L heel  -          User Key  (r) = Recorded By, (t) = Taken By, (c) = Cosigned By    Initials Name Provider Type    Shi Mane PT Physical Therapist                 ARCELIA Wound     Row Name 03/31/23 1345             [REMOVED] Wound 01/28/23 1614 Right posterior heel Pressure Injury    Wound - Properties Group Placement Date: 01/28/23  -WR Placement Time: 1614  -WR Present on Hospital Admission: Y  -WR Side: Right  -WR Orientation: posterior  -WR Location: heel  -WR Primary Wound Type: Pressure inj  -WR Removal Date: 03/31/23  -VERONICA    Retired Wound -  Properties Group Placement Date: 01/28/23  -WR Placement Time: 1614  -WR Present on Hospital Admission: Y  -WR Side: Right  -WR Orientation: posterior  -WR Location: heel  -WR Primary Wound Type: Pressure inj  -WR Removal Date: 03/31/23  -VERONICA    Retired Wound - Properties Group Date first assessed: 01/28/23  -WR Time first assessed: 1614  -WR Present on Hospital Admission: Y  -WR Side: Right  -WR Location: heel  -WR Primary Wound Type: Pressure inj  -WR Resolution Date: 03/31/23  -VERONICA       [REMOVED] Wound 01/28/23 1618 Left posterior thigh     Wound - Properties Group Placement Date: 01/28/23  -WR Placement Time: 1618  -WR Present on Hospital Admission: Y  -WR Side: Left  -WR Orientation: posterior  -WR Location: thigh  -WR Primary Wound Type: --  -WR, Blanchable redness  Removal Date: 03/31/23  -VERONICA    Retired Wound - Properties Group Placement Date: 01/28/23  -WR Placement Time: 1618  -WR Present on Hospital Admission: Y  -WR Side: Left  -WR Orientation: posterior  -WR Location: thigh  -WR Primary Wound Type: --  -WR, Blanchable redness  Removal Date: 03/31/23  -VERONICA    Retired Wound - Properties Group Date first assessed: 01/28/23  -WR Time first assessed: 1618  -WR Present on Hospital Admission: Y  -WR Side: Left  -WR Location: thigh  -WR Primary Wound Type: --  -WR, Blanchable redness  Resolution Date: 03/31/23  -VERONICA       [REMOVED] Wound 01/28/23 2200 Right distal arm Skin Tear    Wound - Properties Group Placement Date: 01/28/23  -AM Placement Time: 2200  -AM Side: Right  -AM Orientation: distal  -AM Location: arm  -AM Primary Wound Type: Skin tear  -AM Removal Date: 03/31/23  -VERONICA    Retired Wound - Properties Group Placement Date: 01/28/23  -AM Placement Time: 2200  -AM Side: Right  -AM Orientation: distal  -AM Location: arm  -AM Primary Wound Type: Skin tear  -AM Removal Date: 03/31/23  -VERONICA    Retired Wound - Properties Group Date first assessed: 01/28/23  -AM Time first assessed: 2200  -AM Side: Right  -AM  Location: arm  -AM Primary Wound Type: Skin tear  -AM Resolution Date: 03/31/23  -VERONICA       [REMOVED] Wound 01/28/23 1614 Right anterior groin MASD (Moisture associated skin damage)    Wound - Properties Group Placement Date: 01/28/23  -WR Placement Time: 1614  -WR Present on Hospital Admission: Y  -WR Side: Right  -WR Orientation: anterior  -WR Location: groin  -WR Primary Wound Type: MASD  -WR Removal Date: 03/31/23  -VERONICA    Retired Wound - Properties Group Placement Date: 01/28/23  -WR Placement Time: 1614  -WR Present on Hospital Admission: Y  -WR Side: Right  -WR Orientation: anterior  -WR Location: groin  -WR Primary Wound Type: MASD  -WR Removal Date: 03/31/23  -VERONICA    Retired Wound - Properties Group Date first assessed: 01/28/23  -WR Time first assessed: 1614  -WR Present on Hospital Admission: Y  -WR Side: Right  -WR Location: groin  -WR Primary Wound Type: MASD  -WR Resolution Date: 03/31/23  -VERONICA       [REMOVED] Wound 01/28/23 2200 Right distal thigh Skin Tear    Wound - Properties Group Placement Date: 01/28/23  -AM Placement Time: 2200  -AM Side: Right  -AM Orientation: distal  -AM Location: thigh  -AM Primary Wound Type: Skin tear  -AM Removal Date: 03/31/23  -VERONICA    Retired Wound - Properties Group Placement Date: 01/28/23  -AM Placement Time: 2200  -AM Side: Right  -AM Orientation: distal  -AM Location: thigh  -AM Primary Wound Type: Skin tear  -AM Removal Date: 03/31/23  -VERONICA    Retired Wound - Properties Group Date first assessed: 01/28/23  -AM Time first assessed: 2200  -AM Side: Right  -AM Location: thigh  -AM Primary Wound Type: Skin tear  -AM Resolution Date: 03/31/23  -VERONICA       [REMOVED] Wound 01/28/23 1616 Left anterior foot Pressure Injury    Wound - Properties Group Placement Date: 01/28/23  -WR Placement Time: 1616  -WR Present on Hospital Admission: Y  -WR Side: Left  -WR Orientation: anterior  -WR, Dorsal  Location: foot  -WR Primary Wound Type: Pressure inj  -WR Removal Date: 03/31/23  -VERONICA     Retired Wound - Properties Group Placement Date: 01/28/23  -WR Placement Time: 1616  -WR Present on Hospital Admission: Y  -WR Side: Left  -WR Orientation: anterior  -WR, Dorsal  Location: foot  -WR Primary Wound Type: Pressure inj  -WR Removal Date: 03/31/23  -    Retired Wound - Properties Group Date first assessed: 01/28/23  -WR Time first assessed: 1616  -WR Present on Hospital Admission: Y  -WR Side: Left  -WR Location: foot  -WR Primary Wound Type: Pressure inj  -WR Resolution Date: 03/31/23  -       [REMOVED] Wound 01/28/23 1611 Bilateral sacral spine Pressure Injury    Wound - Properties Group Placement Date: 01/28/23  -WR Placement Time: 1611  -WR Present on Hospital Admission: Y  -WR Side: Bilateral  -WR Location: sacral spine  -WR Primary Wound Type: Pressure inj  -WR Additional Comments: stage 1  -WR Removal Date: 03/31/23  -    Retired Wound - Properties Group Placement Date: 01/28/23  -WR Placement Time: 1611  -WR Present on Hospital Admission: Y  -WR Side: Bilateral  -WR Location: sacral spine  -WR Primary Wound Type: Pressure inj  -WR Additional Comments: stage 1  -WR Removal Date: 03/31/23  -    Retired Wound - Properties Group Date first assessed: 01/28/23  -WR Time first assessed: 1611  -WR Present on Hospital Admission: Y  -WR Side: Bilateral  -WR Location: sacral spine  -WR Primary Wound Type: Pressure inj  -WR Additional Comments: stage 1  -WR Resolution Date: 03/31/23  -       Wound 03/31/23 1345 Right medial heel Fissure    Wound - Properties Group Placement Date: 03/31/23  - Placement Time: 1345  -JM Side: Right  -JM Orientation: medial  - Location: heel  -JM Primary Wound Type: Fissure  -    Dressing Appearance intact;moist drainage  -      Base moist;necrotic;yellow;slough  -      Periwound intact;dry;redness;blanchable  -      Periwound Temperature warm  -      Periwound Skin Turgor soft  -      Edges irregular;open  -      Drainage Characteristics/Odor  "yellow  -JM      Drainage Amount small  -      Care, Wound cleansed with;wound cleanser;debrided;honey applied  -      Dressing Care dressing applied;foam;border dressing;multi-layer wrap  4\" optifoam, MLW  -JM      Periwound Care cleansed with pH balanced cleanser;dry periwound area maintained  -JM      Retired Wound - Properties Group Placement Date: 03/31/23  - Placement Time: 1345  -JM Side: Right  - Orientation: medial  - Location: heel  -JM Primary Wound Type: Fissure  -JM    Retired Wound - Properties Group Date first assessed: 03/31/23  - Time first assessed: 1345  - Side: Right  - Location: heel  -JM Primary Wound Type: Fissure  -JM       Wound 03/15/23 1115 Left lateral foot Traumatic    Wound - Properties Group Placement Date: 03/15/23  - Placement Time: 1115 - Present on Hospital Admission: N  - Side: Left  - Orientation: lateral  - Location: foot  - Primary Wound Type: Traumatic  -    Dressing Appearance dry;intact;dried drainage  -      Base yellow;clean;dry  appears closed but fragile  -      Periwound intact;dry;pink  -      Periwound Temperature warm  -      Periwound Skin Turgor soft  -      Edges irregular;open  -      Drainage Characteristics/Odor serous  -JM      Drainage Amount scant  pinpoint drainage on prev dressing  -JM      Care, Wound cleansed with;wound cleanser  -      Dressing Care dressing applied;foam;border dressing;multi-layer wrap  2\" optifoam, MLW  -JM      Periwound Care cleansed with pH balanced cleanser;dry periwound area maintained  -      Retired Wound - Properties Group Placement Date: 03/15/23  - Placement Time: 1115  - Present on Hospital Admission: N  - Side: Left  - Orientation: lateral  - Location: foot  - Primary Wound Type: Traumatic  -MC    Retired Wound - Properties Group Date first assessed: 03/15/23  - Time first assessed: 1115 - Present on Hospital Admission: N  - Side: Left  - Location: foot  " "- Primary Wound Type: Traumatic  -MC       Wound 03/15/23 1115 Left proximal leg Skin Tear    Wound - Properties Group Placement Date: 03/15/23  - Placement Time: 1115  - Present on Hospital Admission: Y  - Side: Left  - Orientation: proximal  -MC Location: leg  -MC Primary Wound Type: Skin tear  -MC    Dressing Appearance intact;moist drainage;other (see comments)  HFB slightly adherent to dry periwound crusts  -      Base clean;granulating;red;yellow;slough  -      Periwound intact;dry;pink;swelling  -      Periwound Temperature warm  -      Periwound Skin Turgor soft  -      Edges open  -      Drainage Characteristics/Odor serosanguineous  -      Drainage Amount small  -      Care, Wound cleansed with;wound cleanser;debrided  -      Dressing Care dressing applied;antimicrobial agent applied;foam;border dressing  HFBr, 4\" optifoam, MLW  -      Periwound Care cleansed with pH balanced cleanser;dry periwound area maintained  -      Retired Wound - Properties Group Placement Date: 03/15/23  - Placement Time: 1115  - Present on Hospital Admission: Y  - Side: Left  - Orientation: proximal  - Location: leg  - Primary Wound Type: Skin tear  -MC    Retired Wound - Properties Group Date first assessed: 03/15/23  - Time first assessed: 1115  - Present on Hospital Admission: Y  - Side: Left  - Location: leg  - Primary Wound Type: Skin tear  -MC       Wound 01/28/23 1613 Left posterior heel Pressure Injury    Wound - Properties Group Placement Date: 01/28/23  -WR Placement Time: 1613  -WR Present on Hospital Admission: Y  -WR Side: Left  -WR Orientation: posterior  -WR Location: heel  -WR Primary Wound Type: Pressure inj  -WR    Dressing Appearance intact;moist drainage  -      Base moist;necrotic;yellow;slough;pink;red;granulating  moist fibrous slough, scant granulation budding noted at periphery  -      Periwound intact;blanchable;pink;macerated  min maceration  - " "     Periwound Temperature warm  -      Periwound Skin Turgor soft  -JM      Edges open  -JM      Drainage Characteristics/Odor serosanguineous;yellow  -JM      Drainage Amount small  -JM      Care, Wound cleansed with;wound cleanser;debrided;ultrasound therapy, non contact low frequency;honey applied  MIST with vashe 5min  -JM      Dressing Care dressing applied;antimicrobial agent applied;foam;border dressing;multi-layer wrap  HFBr, 4\" optifoam, MLW  -JM      Periwound Care cleansed with pH balanced cleanser;dry periwound area maintained  -JM      Retired Wound - Properties Group Placement Date: 01/28/23  -WR Placement Time: 1613  -WR Present on Hospital Admission: Y  -WR Side: Left  -WR Orientation: posterior  -WR Location: heel  -WR Primary Wound Type: Pressure inj  -WR    Retired Wound - Properties Group Date first assessed: 01/28/23  -WR Time first assessed: 1613  -WR Present on Hospital Admission: Y  -WR Side: Left  -WR Location: heel  -WR Primary Wound Type: Pressure inj  -WR          User Key  (r) = Recorded By, (t) = Taken By, (c) = Cosigned By    Initials Name Provider Type    Lucila Mohan, PT Physical Therapist    Shi Mane, PT Physical Therapist    Shi Mane, PT Physical Therapist    WR Aditi Jeter, RN Registered Nurse    Brianna Vaca, RN Registered Nurse               Lymphedema     Row Name 03/31/23 1109             Lymphedema Edema Assessment    Ptting Edema Category By severity  -      Pitting Edema Moderate  -         Skin Changes/Observations    Location/Assessment Lower Extremity  -      Lower Extremity Conditions bilateral:;clean;dry;shiny;fragile;crust  -      Lower Extremity Color/Pigment bilateral:;blanchable;hyperpigmented;other (comment)  areas of ecchymosis distal R foot and 4th toe, improved  -         Lymphedema Pulses/Capillary Refill    Lower Extremity Capillary Refill right:;left:;less than 3 seconds  -         Compression/Skin " Care    Compression/Skin Care skin care;wrapping location;bandaging  -VERONICA      Skin Care washed/dried;lotion applied  -      Wrapping Location lower extremity  -      Wrapping Location LE bilateral:;foot to knee  -      Wrapping Comments size 4 compressogrips doubled distal 1/2 for gradient compression  -VERONICA            User Key  (r) = Recorded By, (t) = Taken By, (c) = Cosigned By    Initials Name Provider Type    Shi Mane, PT Physical Therapist                WOUND DEBRIDEMENT  Total area of Debridement: 2cmsq  Debridement Site 1  Location- Site 1: L heel  Selective Debridement- Site 1: Wound Surface <20cmsq  Instruments- Site 1: other (comment) (gauze for nonselective only after MIST due to pain)  Excised Tissue Description- Site 1: scant, slough  Bleeding- Site 1: none   Debridement Site 2  Location- Site 2: lat LLE wound  Selective Debridement- Site 2: Wound Surface <20cmsq  Instruments- Site 2: tweezers  Excised Tissue Description- Site 2: minimum, slough  Bleeding- Site 2: none          Therapy Education     Row Name 03/31/23 9448             Therapy Education    Education Details Continue therahoney and HFB to wounds, cover with optifoam gentle.  Continue consistent compression and leg elevation.  Recommended cocoa butter or thick moisturizer for heel fissures, cover with optifoam gentle, may need to add HFB if opens.  -VERONICA      Given Symptoms/condition management;Bandaging/dressing change  -VERONICA      Program Reinforced;Modified;Progressed  -VERONICA      How Provided Verbal;Demonstration  -VERONICA      Provided to Patient;Caregiver  -VERONICA      Level of Understanding Verbalized;Teach back education performed  -VERONICA            User Key  (r) = Recorded By, (t) = Taken By, (c) = Cosigned By    Initials Name Provider Type    Shi Mane, PT Physical Therapist                Recommendation and Plan   PT Assessment/Plan     Row Name 03/31/23 8158          PT Assessment    Functional Limitations Performance  in self-care ADL;Impaired gait;Other (comment)  wound management limitations  -     Impairments Integumentary integrity;Pain;Impaired sensory integrity  -     Assessment Comments Pt with improving LLE wounds with less pain after initiation of MIST, but still too painful for much sharp debridement.  R heel now with open fissure that may progress to open wound due to pt's impaired vascular status and DM.  Continue with POC to facilitate wound closure and improved skin integrity.  -        PT Plan    PT Frequency 2x/week  -     Physical Therapy Interventions (Optional Details) patient/family education;wound care  -     PT Plan Comments MIST, debridement, MLW  -           User Key  (r) = Recorded By, (t) = Taken By, (c) = Cosigned By    Initials Name Provider Type    Shi Mane, PT Physical Therapist                Goals   PT OP Goals     Row Name 03/31/23 1345          Time Calculation    PT Goal Re-Cert Due Date 05/18/23  -           User Key  (r) = Recorded By, (t) = Taken By, (c) = Cosigned By    Initials Name Provider Type    Shi Mane, PT Physical Therapist                PT Goal Re-Cert Due Date: 05/18/23            Time Calculation: Start Time: 1345  Untimed Charges  78082-Hfbywocsqr comp below knee: 10  68410-[place]rsiz[place]w debridement: 15  93252-BWXM Mist: 10  Total Minutes  Untimed Charges Total Minutes: 35   Total Minutes: 35  Therapy Charges for Today     Code Description Service Date Service Provider Modifiers Qty    67654463307 HC PIPE DEBRIDE OPEN WOUND UP TO 20CM 3/31/2023 Shi Cordoba, PT GP 1    27315750396 HC PT NLFU MIST 3/31/2023 Shi Cordoba, PT GP 1    61595640995 HC PT MULTI LAYER COMP SYS BELOW KNEE 3/31/2023 Shi Cordoba, PT GP 1                  Shi Cordoba PT  3/31/2023      Consult...

## 2023-04-04 ENCOUNTER — HOSPITAL ENCOUNTER (OUTPATIENT)
Dept: PHYSICAL THERAPY | Facility: HOSPITAL | Age: 83
Setting detail: THERAPIES SERIES
Discharge: HOME | End: 2023-04-04
Payer: MEDICARE

## 2023-04-04 DIAGNOSIS — R60.0 BILATERAL LOWER EXTREMITY EDEMA: ICD-10-CM

## 2023-04-04 DIAGNOSIS — S91.301D OPEN WOUND OF RIGHT HEEL, SUBSEQUENT ENCOUNTER: ICD-10-CM

## 2023-04-04 DIAGNOSIS — S81.812A ISTAP TYPE 2 SKIN TEAR OF LEFT LOWER LEG: ICD-10-CM

## 2023-04-04 DIAGNOSIS — S91.302D OPEN WOUND OF LEFT HEEL, SUBSEQUENT ENCOUNTER: Primary | ICD-10-CM

## 2023-04-04 PROCEDURE — 97610 LOW FREQUENCY NON-THERMAL US: CPT

## 2023-04-04 PROCEDURE — 97597 DBRDMT OPN WND 1ST 20 CM/<: CPT

## 2023-04-04 PROCEDURE — 29581 APPL MULTLAYER CMPRN SYS LEG: CPT

## 2023-04-07 ENCOUNTER — HOSPITAL ENCOUNTER (OUTPATIENT)
Dept: PHYSICAL THERAPY | Facility: HOSPITAL | Age: 83
Setting detail: THERAPIES SERIES
Discharge: HOME | End: 2023-04-07
Payer: MEDICARE

## 2023-04-07 DIAGNOSIS — S91.302D OPEN WOUND OF LEFT HEEL, SUBSEQUENT ENCOUNTER: Primary | ICD-10-CM

## 2023-04-07 DIAGNOSIS — R60.0 BILATERAL LOWER EXTREMITY EDEMA: ICD-10-CM

## 2023-04-07 DIAGNOSIS — S91.301D OPEN WOUND OF RIGHT HEEL, SUBSEQUENT ENCOUNTER: ICD-10-CM

## 2023-04-07 DIAGNOSIS — S81.812A ISTAP TYPE 2 SKIN TEAR OF LEFT LOWER LEG: ICD-10-CM

## 2023-04-07 DIAGNOSIS — S91.302D OPEN WOUND OF LEFT FOOT, SUBSEQUENT ENCOUNTER: ICD-10-CM

## 2023-04-07 DIAGNOSIS — S81.811A ISTAP TYPE 2 SKIN TEAR OF RIGHT LOWER LEG: ICD-10-CM

## 2023-04-07 PROCEDURE — 97610 LOW FREQUENCY NON-THERMAL US: CPT

## 2023-04-07 PROCEDURE — 97597 DBRDMT OPN WND 1ST 20 CM/<: CPT

## 2023-04-07 PROCEDURE — 29581 APPL MULTLAYER CMPRN SYS LEG: CPT

## 2023-04-07 NOTE — THERAPY WOUND CARE TREATMENT
Outpatient Rehabilitation - Wound/Debridement Treatment Note  [facility]     Patient Name: Susan Anderson  : 1940  MRN: [id number]  Today's Date: 2023                 Admit Date: 2023    Visit Dx:    ICD-10-CM ICD-9-CM   1. Open wound of left heel, subsequent encounter  S91.302D V58.89     892.0   2. Open wound of right heel, subsequent encounter  S91.301D V58.89     892.0   3. Bilateral lower extremity edema  R60.0 782.3   4. ISTAP type 2 skin tear of left lower leg  S81.812A 891.0   5. ISTAP type 2 skin tear of right lower leg  S81.811A 891.0   6. Open wound of left foot, subsequent encounter  S91.302D V58.89     892.0       Patient Active Problem List   Diagnosis   • Diabetic foot ulcer   • PVD (peripheral vascular disease) ([facility])   • Osteomyelitis   • Diabetes mellitus with neuropathy   • Essential hypertension   • Stage 3a chronic kidney disease   • Pyogenic inflammation of bone   • Hyperglycemia   • Mitral valve disease   • NICM (nonischemic cardiomyopathy) ([facility])   • CAD   • Dyslipidemia   • Chronic combined systolic and diastolic heart failure    • Lumbar stenosis with neurogenic claudication   • Spondylosis of lumbar region without myelopathy or radiculopathy   • Spondylolisthesis, lumbar region   • Degeneration of lumbar or lumbosacral intervertebral disc   • Gait disturbance   • Sarcopenia   • Anemia   • Demand ischemia   • Right knee pain   • Sleep apnea   • GIB (gastrointestinal bleeding)   • Vasovagal syncope   • Paroxysmal atrial fibrillation   • Ulcer of esophagus without bleeding   • T2DM         Past Medical History:   Diagnosis Date   • Anemia    • Anxiety    • Arthritis    • Asthma  - double pneumonia    Currently on inhaler and nebulizer   • Atrial fibrillation 2022   • Brain tumor     Benign and followed at    • Cancer     cervical cancer, skin cancer   • CHF (congestive heart failure) 2021   • Chronic kidney disease Related to diabetes   • Clotting  disorder 10/22    Upper GI bleed from blood thinners aggravate ulcers   • Coronary artery disease 6/4/2021 DX for hear failure   • COVID-19 01/28/2023   • Depression 8/22   • Diabetes mellitus 30 years    Seeing Dr. Lam 1st time Aug 19   • Dizziness 07/27/2022   • Effusion of right knee 08/12/2022   • Fall 07/27/2022   • GERD (gastroesophageal reflux disease)    • Gout    • History of medical problems 8/22    AFIB   • History of staph infection 10/2021    right toe   • History of transfusion     no reactions, 1 unit, [facility]   • HL (hearing loss)     Acoustic neuroma right   • Hx of colonoscopy    • Hyperlipidemia Reference current labs x 2-3yrs approx   • Hypertension 30 years   • Hypomagnesemia 10/06/2022   • Insomnia    • Low back pain    • Migraine    • Mitral valve disease    • Mitral valve disease    • Osteomyelitis    • Peptic ulceration 10/22    Secondary to blood thinners   • Peripheral neuropathy    • Physical deconditioning 05/17/2022   • Pneumonia due to infectious organism 06/04/2021   • Pressure injury of skin of sacral region 08/21/2022   • Renal insufficiency 2021   • Sepsis 01/28/2023   • Sleep apnea    • Syncope, unspecified syncope type 10/06/2022   • Type 2 diabetes mellitus     30 years   • Weakness 07/27/2022        Past Surgical History:   Procedure Laterality Date   • ABDOMINAL HYSTERECTOMY W/SALPINGECTOMY     • AMPUTATION  Right great toe, 1st 1/3 metatarsal -[place] 4/14/21   • AORTAGRAM N/A 04/09/2021    Procedure: AORTAGRAM WITH OR WITHOUT RUNOFFS, WITH Co2;  Surgeon: Trey Forrest MD;  Location: [facility];  Service: Vascular;  Laterality: N/A;   • AORTAGRAM N/A 09/28/2022    Procedure: CO2 ANGIOGRAM, LEFT SFA ANGIOPLASTY WITH DRUG ELUTING BALLOON, LEFT SFA STENT PLACEMENT ;  Surgeon: Trey Forrest MD;  Location: [facility];  Service: Vascular;  Laterality: N/A;  FLUORO: 13.12  DOSE 162mGy  CONTRAST: Isovue 350: 15ml   • APPENDECTOMY     • BRAIN TUMOR EXCISION      laser  surgery    • CARDIAC CATHETERIZATION N/A 06/21/2021    Procedure: LEFT HEART CATH;  Surgeon: Anjum Ceballos MD;  Location:  [facility];  Service: Cardiology;  Laterality: N/A;   • CATARACT EXTRACTION W/ INTRAOCULAR LENS  IMPLANT, BILATERAL     • CHOLECYSTECTOMY     • COLONOSCOPY     • ENDOSCOPY N/A 10/07/2022    Procedure: ESOPHAGOGASTRODUODENOSCOPY;  Surgeon: Stef Veras MD;  Location:  [facility];  Service: Gastroenterology;  Laterality: N/A;   • EYE SURGERY     • FEMORAL ARTERY STENT  10/2022   • HYSTERECTOMY     • INTERVENTIONAL RADIOLOGY PROCEDURE N/A 1/15/2023    Procedure: CV INTERVENTIONAL RADIOLOGY PROCEDURE;  Surgeon: Sanket Zapata MD;  Location:  [facility];  Service: Interventional Radiology;  Laterality: N/A;   • TOE SURGERY     • TRANS METATARSAL AMPUTATION Right 04/14/2021    Procedure: AMPUTATION TRANS METATARSAL RIGHT GREAT TOE;  Surgeon: Valdez Finney MD;  Location:  [facility];  Service: Vascular;  Laterality: Right;         EVALUATION   PT Ortho     Row Name 04/07/23 1300       Subjective Comments    Subjective Comments Pt reports continued heel pain.  Family asking about restrictions for OP PT that pt is starting next week at [facility].  -JM       Subjective Pain    Able to rate subjective pain? yes  -JM    Pre-Treatment Pain Level 5  -JM    Post-Treatment Pain Level 5  -JM    Subjective Pain Comment facial scale, significant pain with debridement of heels  -JM       Transfers    Comment, (Transfers) seated in electric w/c for tx  -JM          User Key  (r) = Recorded By, (t) = Taken By, (c) = Cosigned By    Initials Name Provider Type    Shi Mane PT Physical Therapist                 [facility] Wound     Row Name 04/07/23 1300             Wound 03/31/23 1345 Right medial heel Fissure    Wound - Properties Group Placement Date: 03/31/23  -VERONICA Placement Time: 1345  -JM Side: Right  - Orientation: medial  - Location: heel  -JM Primary  "Wound Type: Fissure  -JM    Dressing Appearance intact;moist drainage  -JM      Base moist;yellow;slough;pink  -JM      Periwound intact;dry;redness;blanchable  -JM      Periwound Temperature warm  -JM      Periwound Skin Turgor soft  -JM      Edges irregular;open  -JM      Wound Length (cm) 0.4 cm  -JM      Wound Width (cm) 0.4 cm  -JM      Wound Depth (cm) --  obscured  -      Wound Surface Area (cm^2) 0.16 cm^2  -JM      Drainage Characteristics/Odor yellow  -JM      Drainage Amount small  -JM      Care, Wound cleansed with;wound cleanser;debrided  -JM      Dressing Care dressing applied;antimicrobial agent applied;foam;border dressing;multi-layer wrap  HFBr, 4\" optifoam, MLW  -JM      Periwound Care cleansed with pH balanced cleanser;dry periwound area maintained  -JM      Retired Wound - Properties Group Placement Date: 03/31/23  - Placement Time: 1345  - Side: Right  - Orientation: medial  - Location: heel  -JM Primary Wound Type: Fissure  -JM    Retired Wound - Properties Group Date first assessed: 03/31/23  - Time first assessed: 1345  - Side: Right  - Location: heel  -JM Primary Wound Type: Fissure  -JM       Wound 03/15/23 1115 Left lateral foot Traumatic    Wound - Properties Group Placement Date: 03/15/23  - Placement Time: 1115  - Present on Hospital Admission: N  - Side: Left  - Orientation: lateral  - Location: foot  - Primary Wound Type: Traumatic  -    Dressing Appearance intact;moist drainage  -JM      Base yellow;clean;dry  appears closed but fragile  -      Periwound intact;dry;pink  -      Periwound Temperature warm  -      Periwound Skin Turgor soft  -      Edges irregular;open  -JM      Drainage Characteristics/Odor serous  -JM      Drainage Amount scant  pinpoint drainage on prev dressing  -JM      Care, Wound cleansed with;wound cleanser;debrided  -JM      Dressing Care dressing applied;foam;border dressing  2\" optifoam  -JM      Periwound Care cleansed " "with pH balanced cleanser;dry periwound area maintained  -JM      Retired Wound - Properties Group Placement Date: 03/15/23  - Placement Time: 1115  -MC Present on Hospital Admission: N  -MC Side: Left  -MC Orientation: lateral  - Location: foot  -MC Primary Wound Type: Traumatic  -MC    Retired Wound - Properties Group Date first assessed: 03/15/23  - Time first assessed: 1115  -MC Present on Hospital Admission: N  -MC Side: Left  -MC Location: foot  -MC Primary Wound Type: Traumatic  -MC       Wound 03/15/23 1115 Left proximal leg Skin Tear    Wound - Properties Group Placement Date: 03/15/23  - Placement Time: 1115  -MC Present on Hospital Admission: Y  -MC Side: Left  - Orientation: proximal  -MC Location: leg  -MC Primary Wound Type: Skin tear  -MC    Dressing Appearance intact;dried drainage  -      Base yellow;moist;pink  -      Periwound intact;dry;pink;swelling  -      Periwound Temperature warm  -      Periwound Skin Turgor soft  -      Edges open  -      Drainage Characteristics/Odor serous  -      Drainage Amount scant  -      Care, Wound cleansed with;wound cleanser;debrided  -      Dressing Care dressing applied;foam;border dressing;multi-layer wrap  2\" optifoam  -      Periwound Care cleansed with pH balanced cleanser;dry periwound area maintained  -      Retired Wound - Properties Group Placement Date: 03/15/23  - Placement Time: 1115  -MC Present on Hospital Admission: Y  -MC Side: Left  - Orientation: proximal  -MC Location: leg  -MC Primary Wound Type: Skin tear  -MC    Retired Wound - Properties Group Date first assessed: 03/15/23  - Time first assessed: 1115  -MC Present on Hospital Admission: Y  -MC Side: Left  - Location: leg  -MC Primary Wound Type: Skin tear  -MC       Wound 01/28/23 1613 Left posterior heel Pressure Injury    Wound - Properties Group Placement Date: 01/28/23  -WR Placement Time: 1613  -WR Present on Hospital Admission: Y  -WR Side: " "Left  -WR Orientation: posterior  -WR Location: heel  -WR Primary Wound Type: Pressure inj  -WR    Dressing Appearance intact;moist drainage  -JM      Base moist;necrotic;yellow;slough;pink;red;granulating  moist fibrous slough, scant granulation budding noted at periphery  -JM      Periwound intact;blanchable;pink;macerated  min maceration  -      Periwound Temperature warm  -      Periwound Skin Turgor soft  -      Edges open  -      Drainage Characteristics/Odor serosanguineous;yellow  -JM      Drainage Amount small  -JM      Care, Wound cleansed with;wound cleanser;debrided;ultrasound therapy, non contact low frequency;honey applied  MIST with vashe 5min  -JM      Dressing Care dressing applied;collagen;antimicrobial agent applied;foam;border dressing;multi-layer wrap  honey, [place], HFBr, 4\" optifoam, MLW  -JM      Periwound Care cleansed with pH balanced cleanser;dry periwound area maintained  -JM      Retired Wound - Properties Group Placement Date: 01/28/23  -WR Placement Time: 1613  -WR Present on Hospital Admission: Y  -WR Side: Left  -WR Orientation: posterior  -WR Location: heel  -WR Primary Wound Type: Pressure inj  -WR    Retired Wound - Properties Group Date first assessed: 01/28/23  -WR Time first assessed: 1613  -WR Present on Hospital Admission: Y  -WR Side: Left  -WR Location: heel  -WR Primary Wound Type: Pressure inj  -WR          User Key  (r) = Recorded By, (t) = Taken By, (c) = Cosigned By    Initials Name Provider Type    Lucila Mohan, PT Physical Therapist    Shi Mane, PT Physical Therapist    WR Aditi Jeter, RN Registered Nurse               Lymphedema     Row Name 04/07/23 2782             Lymphedema Edema Assessment    Ptting Edema Category By severity  -      Pitting Edema Moderate  -         Skin Changes/Observations    Location/Assessment Lower Extremity  -      Lower Extremity Conditions bilateral:;clean;dry;shiny;fragile;crust  -      " Lower Extremity Color/Pigment bilateral:;blanchable;hyperpigmented;other (comment)  lat R calf ecchymosis  -      Lower Extremity Skin Details small blister to R lat 4th toe, skin tear to R knee, both covered with optifoams  -         Lymphedema Pulses/Capillary Refill    Lower Extremity Capillary Refill right:;left:;less than 3 seconds  -         Compression/Skin Care    Compression/Skin Care skin care;wrapping location;bandaging  -      Skin Care washed/dried;lotion applied  -      Wrapping Location lower extremity  -      Wrapping Location LE bilateral:;foot to knee  -      Wrapping Comments size 4 compressogrips doubled distal 1/2 for gradient compression  -            User Key  (r) = Recorded By, (t) = Taken By, (c) = Cosigned By    Initials Name Provider Type    Shi Mane, PT Physical Therapist                WOUND DEBRIDEMENT  Total area of Debridement: 1cmsq  Debridement Site 1  Location- Site 1: L heel  Selective Debridement- Site 1: Wound Surface <20cmsq  Instruments- Site 1: #15, scapel, tweezers  Excised Tissue Description- Site 1: minimum, slough (limited by pt c/o pain)  Bleeding- Site 1: none              Therapy Education     Row Name 04/07/23 1936             Therapy Education    Given Symptoms/condition management;Bandaging/dressing change  -      Program Reinforced;Modified;Progressed  -VERONICA      How Provided Verbal;Demonstration  -VERONICA      Provided to Patient;Caregiver  -VERONICA      Level of Understanding Verbalized;Teach back education performed  -            User Key  (r) = Recorded By, (t) = Taken By, (c) = Cosigned By    Initials Name Provider Type    Shi Mane, PT Physical Therapist                Recommendation and Plan   PT Assessment/Plan     Row Name 04/07/23 5245          PT Assessment    Functional Limitations Performance in self-care ADL;Impaired gait;Other (comment)  wound management limitations  -VERONICA     Impairments Integumentary  integrity;Pain;Impaired sensory integrity  -     Assessment Comments Lat LLE and L foot wounds nearly closed.  Pt with small skin tear to R knee and blister to R lat toe that caregiver had dressed with optifoams.  L heel still with continued necrotic tissue, pain limiting debridement.  PT recommended pt obtain lidocaine gel from pharmacy or OTC, to help with pain during debridement.  Continue compression and dressings managment with debridement as tolerated.  Will assess response to MIST over next few txs.  -        PT Plan    PT Frequency 2x/week  -     Physical Therapy Interventions (Optional Details) patient/family education;wound care  -     PT Plan Comments MIST, debridement, MLW  -           User Key  (r) = Recorded By, (t) = Taken By, (c) = Cosigned By    Initials Name Provider Type    Shi Mane, PT Physical Therapist                Goals   PT OP Goals     Row Name 04/07/23 1345          Time Calculation    PT Goal Re-Cert Due Date 05/18/23  -           User Key  (r) = Recorded By, (t) = Taken By, (c) = Cosigned By    Initials Name Provider Type    Shi Mane, PT Physical Therapist                PT Goal Re-Cert Due Date: 05/18/23            Time Calculation: Start Time: 1345  Untimed Charges  74775-Xtxuddhbcl comp below knee: 10  23297-Vqckppkpt debridement: 15  99216-HTYZ Mist: 10  Total Minutes  Untimed Charges Total Minutes: 35   Total Minutes: 35  Therapy Charges for Today     Code Description Service Date Service Provider Modifiers Qty    [id number] HC PIPE DEBRIDE OPEN WOUND UP TO 20CM 4/7/2023 Shi Cordoba, PT GP 1    [id number]  PT NLFU MIST 4/7/2023 Shi Cordoba, PT GP 1    [id number]  PT MULTI LAYER COMP SYS BELOW KNEE 4/7/2023 Shi Cordboa, PT GP 1                  Shi Cordoba, PT  4/7/2023

## 2023-04-08 ENCOUNTER — PATIENT MESSAGE (OUTPATIENT)
Dept: INTERNAL MEDICINE | Facility: CLINIC | Age: 83
End: 2023-04-08
Payer: MEDICARE

## 2023-04-10 PROBLEM — D50.9 IRON DEFICIENCY ANEMIA, UNSPECIFIED: Status: ACTIVE | Noted: 2023-04-10

## 2023-04-11 ENCOUNTER — HOSPITAL ENCOUNTER (OUTPATIENT)
Dept: PHYSICAL THERAPY | Facility: HOSPITAL | Age: 83
Setting detail: THERAPIES SERIES
Discharge: HOME | End: 2023-04-11
Payer: MEDICARE

## 2023-04-11 DIAGNOSIS — S91.301D OPEN WOUND OF RIGHT HEEL, SUBSEQUENT ENCOUNTER: ICD-10-CM

## 2023-04-11 DIAGNOSIS — S81.811A ISTAP TYPE 2 SKIN TEAR OF RIGHT LOWER LEG: ICD-10-CM

## 2023-04-11 DIAGNOSIS — S91.104D OPEN WOUND OF FOURTH TOE OF RIGHT FOOT, SUBSEQUENT ENCOUNTER: ICD-10-CM

## 2023-04-11 DIAGNOSIS — S91.302D OPEN WOUND OF LEFT HEEL, SUBSEQUENT ENCOUNTER: Primary | ICD-10-CM

## 2023-04-11 DIAGNOSIS — R60.0 BILATERAL LOWER EXTREMITY EDEMA: ICD-10-CM

## 2023-04-11 PROCEDURE — 97597 DBRDMT OPN WND 1ST 20 CM/<: CPT

## 2023-04-11 PROCEDURE — 29581 APPL MULTLAYER CMPRN SYS LEG: CPT

## 2023-04-11 NOTE — TELEPHONE ENCOUNTER
From: Susan Anderson  To: Arleen Morganuong Chas  Sent: 4/8/2023 4:21 PM EDT  Subject: Zoloft    Saw my kidney doctor last Monday. After discussion with him I will not take seroquel. I re-started Zoloft last Monday 25mg and would like to increase it to 50 to help with anxiety.     I need a new prescription for 50mg Zoloft sent to [facility].     Also I start therapy outpatient at [facility] next week. Thannk you.     Susan Anderson

## 2023-04-11 NOTE — THERAPY WOUND CARE TREATMENT
Outpatient Rehabilitation - Wound/Debridement Treatment Note   [place]     Patient Name: Susan Anderson  : 1940  MRN: [id number]  Today's Date: 2023             R heel      R lateral toe (NEW)        R knee      L heel      Admit Date: 2023    Visit Dx:    ICD-10-CM ICD-9-CM   1. Open wound of left heel, subsequent encounter  S91.302D V58.89     892.0   2. Open wound of right heel, subsequent encounter  S91.301D V58.89     892.0   3. Bilateral lower extremity edema  R60.0 782.3   4. ISTAP type 2 skin tear of right lower leg  S81.811A 891.0   5. Open wound of fourth toe of right foot, subsequent encounter  S91.104D V58.89     893.0       Patient Active Problem List   Diagnosis   • Diabetic foot ulcer   • PVD (peripheral vascular disease) ([facility])   • Osteomyelitis   • Diabetes mellitus with neuropathy   • Essential hypertension   • Stage 3a chronic kidney disease   • Pyogenic inflammation of bone   • Hyperglycemia   • Mitral valve disease   • NICM (nonischemic cardiomyopathy) ([facility])   • CAD   • Dyslipidemia   • Chronic combined systolic and diastolic heart failure    • Lumbar stenosis with neurogenic claudication   • Spondylosis of lumbar region without myelopathy or radiculopathy   • Spondylolisthesis, lumbar region   • Degeneration of lumbar or lumbosacral intervertebral disc   • Gait disturbance   • Sarcopenia   • Anemia   • Demand ischemia   • Right knee pain   • Sleep apnea   • GIB (gastrointestinal bleeding)   • Vasovagal syncope   • Paroxysmal atrial fibrillation   • Ulcer of esophagus without bleeding   • T2DM    • Iron deficiency anemia, unspecified        Past Medical History:   Diagnosis Date   • Anemia    • Anxiety    • Arthritis    • Asthma  - double pneumonia    Currently on inhaler and nebulizer   • Atrial fibrillation 2022   • Brain tumor     Benign and followed at    • Cancer     cervical cancer, skin cancer   • CHF (congestive heart failure) 2021   •  Chronic kidney disease Related to diabetes   • Clotting disorder 10/22    Upper GI bleed from blood thinners aggravate ulcers   • Coronary artery disease 6/4/2021 DX for hear failure   • COVID-19 01/28/2023   • Depression 8/22   • Diabetes mellitus 30 years    Seeing Dr. Lam 1st time Aug 19   • Dizziness 07/27/2022   • Effusion of right knee 08/12/2022   • Fall 07/27/2022   • GERD (gastroesophageal reflux disease)    • Gout    • History of medical problems 8/22    AFIB   • History of staph infection 10/2021    right toe   • History of transfusion     no reactions, 1 unit, [facility]   • HL (hearing loss)     Acoustic neuroma right   • Hx of colonoscopy    • Hyperlipidemia Reference current labs x 2-3yrs approx   • Hypertension 30 years   • Hypomagnesemia 10/06/2022   • Insomnia    • Low back pain    • Migraine    • Mitral valve disease    • Mitral valve disease    • Osteomyelitis    • Peptic ulceration 10/22    Secondary to blood thinners   • Peripheral neuropathy    • Physical deconditioning 05/17/2022   • Pneumonia due to infectious organism 06/04/2021   • Pressure injury of skin of sacral region 08/21/2022   • Renal insufficiency 2021   • Sepsis 01/28/2023   • Sleep apnea    • Syncope, unspecified syncope type 10/06/2022   • Type 2 diabetes mellitus     30 years   • Weakness 07/27/2022        Past Surgical History:   Procedure Laterality Date   • ABDOMINAL HYSTERECTOMY W/SALPINGECTOMY     • AMPUTATION  Right great toe, 1st 1/3 metatarsal -Reg 4/14/21   • AORTAGRAM N/A 04/09/2021    Procedure: AORTAGRAM WITH OR WITHOUT RUNOFFS, WITH Co2;  Surgeon: Trey Forrest MD;  Location: [facility];  Service: Vascular;  Laterality: N/A;   • AORTAGRAM N/A 09/28/2022    Procedure: CO2 ANGIOGRAM, LEFT SFA ANGIOPLASTY WITH DRUG ELUTING BALLOON, LEFT SFA STENT PLACEMENT ;  Surgeon: Trey Forrest MD;  Location: [facility];  Service: Vascular;  Laterality: N/A;  FLUORO: 13.12  DOSE 162mGy  CONTRAST: Isovue 350: 15ml    • APPENDECTOMY     • BRAIN TUMOR EXCISION      laser surgery    • CARDIAC CATHETERIZATION N/A 06/21/2021    Procedure: LEFT HEART CATH;  Surgeon: Anjum Ceballos MD;  Location:  [facility];  Service: Cardiology;  Laterality: N/A;   • CATARACT EXTRACTION W/ INTRAOCULAR LENS  IMPLANT, BILATERAL     • CHOLECYSTECTOMY     • COLONOSCOPY     • ENDOSCOPY N/A 10/07/2022    Procedure: ESOPHAGOGASTRODUODENOSCOPY;  Surgeon: Stef Veras MD;  Location:  [facility];  Service: Gastroenterology;  Laterality: N/A;   • EYE SURGERY     • FEMORAL ARTERY STENT  10/2022   • HYSTERECTOMY     • INTERVENTIONAL RADIOLOGY PROCEDURE N/A 1/15/2023    Procedure: CV INTERVENTIONAL RADIOLOGY PROCEDURE;  Surgeon: Sanket Zapata MD;  Location:  [facility];  Service: Interventional Radiology;  Laterality: N/A;   • TOE SURGERY     • TRANS METATARSAL AMPUTATION Right 04/14/2021    Procedure: AMPUTATION TRANS METATARSAL RIGHT GREAT TOE;  Surgeon: Valdez Finney MD;  Location:  [facility];  Service: Vascular;  Laterality: Right;         EVALUATION   PT Ortho     Row Name 04/11/23 1500       Subjective Comments    Subjective Comments Pt is unsure when the R 4th toe wound happened. Dtr Katie reports it was Friday. Caregiver did not replace compression today due to coming here for tx so soon.  -       Subjective Pain    Able to rate subjective pain? yes  -    Pre-Treatment Pain Level 2  -MC    Post-Treatment Pain Level 6  -    Subjective Pain Comment increased with R heel palpation  -       Transfers    Comment, (Transfers) seated in electric w/c for tx  -          User Key  (r) = Recorded By, (t) = Taken By, (c) = Cosigned By    Initials Name Provider Type    Lucila Mohan PT Physical Therapist                 [facility] Wound     Row Name 04/11/23 1500             Wound 04/11/23 1300 Right anterior knee Skin Tear    Wound - Properties Group Placement Date: 04/11/23  - Placement Time: 1300  -  "Present on Hospital Admission: N  - Side: Right  - Orientation: anterior  -MC Location: knee  -MC Primary Wound Type: Skin tear  -MC    Wound Image Images linked: 1  -MC      Dressing Appearance intact;moist drainage  -      Base moist;pink;red;yellow;slough  -      Periwound intact;dry  -      Periwound Temperature warm  -      Periwound Skin Turgor soft  -MC      Edges irregular;open  -      Wound Length (cm) 0.2 cm  -MC      Wound Width (cm) 1.4 cm  -      Wound Depth (cm) 0.1 cm  -      Wound Surface Area (cm^2) 0.28 cm^2  -MC      Wound Volume (cm^3) 0.028 cm^3  -      Drainage Characteristics/Odor serous  -      Drainage Amount small  -      Care, Wound cleansed with;wound cleanser;debrided;honey applied  -      Dressing Care dressing applied;low-adherent;foam;border dressing  2\" optifoam  -      Periwound Care cleansed with pH balanced cleanser;dry periwound area maintained  -      Retired Wound - Properties Group Placement Date: 04/11/23  - Placement Time: 1300  - Present on Hospital Admission: N  - Side: Right  - Orientation: anterior  - Location: knee  - Primary Wound Type: Skin tear  -MC    Retired Wound - Properties Group Date first assessed: 04/11/23  - Time first assessed: 1300  - Present on Hospital Admission: N  - Side: Right  - Location: knee  - Primary Wound Type: Skin tear  -MC       Wound 04/11/23 1300 Right lateral fourth toe Soft Tissue Necrosis    Wound - Properties Group Placement Date: 04/11/23  - Placement Time: 1300  - Present on Hospital Admission: N  - Side: Right  - Orientation: lateral  -MC Location: fourth toe  - Primary Wound Type: Soft tissue  -    Wound Image Images linked: 2  -      Dressing Appearance open to air  -      Base necrotic;yellow;subcutaneous;slough;scab;moist;dry  -MC      Periwound redness;swelling;warm;moist  -      Periwound Temperature warm  -      Periwound Skin Turgor soft  -      Edges " "irregular;open  -MC      Wound Length (cm) 1.7 cm  -MC      Wound Width (cm) 1.6 cm  -MC      Wound Depth (cm) --  obscured  -      Wound Surface Area (cm^2) 2.72 cm^2  -MC      Drainage Characteristics/Odor serous  -MC      Drainage Amount small  -MC      Care, Wound cleansed with;wound cleanser;debrided;honey applied  -      Dressing Care dressing applied;low-adherent;foam;border dressing  2\" optifoam  -      Periwound Care cleansed with pH balanced cleanser;dry periwound area maintained  -      Retired Wound - Properties Group Placement Date: 04/11/23  - Placement Time: 1300  - Present on Hospital Admission: N  - Side: Right  - Orientation: lateral  - Location: fourth toe  -MC Primary Wound Type: Soft tissue  -MC    Retired Wound - Properties Group Date first assessed: 04/11/23  - Time first assessed: 1300  - Present on Hospital Admission: N  - Side: Right  - Location: fourth toe  - Primary Wound Type: Soft tissue  -MC       Wound 03/31/23 1345 Right medial heel Fissure    Wound - Properties Group Placement Date: 03/31/23  - Placement Time: 1345  -JM Side: Right  - Orientation: medial  - Location: heel  - Primary Wound Type: Fissure  -    Wound Image Images linked: 1  -MC      Dressing Appearance intact;moist drainage  -      Base moist;yellow;slough;pink;red  central necrotic area stained blue from HFB, surrounding red partial-thickness open area  -      Periwound intact;dry;redness;blanchable;warm  notably warm, erythemic periwound area  -      Periwound Temperature hot  -      Periwound Skin Turgor soft  -MC      Edges irregular;open  -MC      Wound Length (cm) 1.3 cm  -MC      Wound Width (cm) 1.4 cm  -MC      Wound Depth (cm) --  obscured  -      Wound Surface Area (cm^2) 1.82 cm^2  -MC      Drainage Characteristics/Odor yellow  -MC      Drainage Amount small  -      Care, Wound cleansed with;wound cleanser;debrided  -      Dressing Care dressing " "applied;silver impregnated;collagen;antimicrobial agent applied;foam;low-adherent;border dressing  carolee, HFBr, 4\" optifoam, MLW  -MC      Periwound Care cleansed with pH balanced cleanser;dry periwound area maintained  -      Retired Wound - Properties Group Placement Date: 03/31/23  - Placement Time: 1345  -JM Side: Right  -JM Orientation: medial  -JM Location: heel  -JM Primary Wound Type: Fissure  -JM    Retired Wound - Properties Group Date first assessed: 03/31/23  - Time first assessed: 1345  -JM Side: Right  -JM Location: heel  -JM Primary Wound Type: Fissure  -JM       Wound 01/28/23 1613 Left posterior heel Pressure Injury    Wound - Properties Group Placement Date: 01/28/23  -WR Placement Time: 1613 -WR Present on Hospital Admission: Y  -WR Side: Left  -WR Orientation: posterior  -WR Location: heel  -WR Primary Wound Type: Pressure inj  -WR    Wound Image Images linked: 1  -      Dressing Appearance intact;moist drainage  -      Base moist;necrotic;yellow;slough;pink;red;granulating  moist fibrous slough, no granulation budding noted today  -      Periwound intact;blanchable;pink;macerated  mod maceration  -      Periwound Temperature warm  -      Periwound Skin Turgor soft  -      Edges open  -      Wound Length (cm) 1.7 cm  -      Wound Width (cm) 1.3 cm  -      Wound Depth (cm) --  obscured  -      Wound Surface Area (cm^2) 2.21 cm^2  -      Drainage Characteristics/Odor serosanguineous;yellow  -      Drainage Amount small  -      Care, Wound cleansed with;wound cleanser;debrided;honey applied  -      Dressing Care dressing applied;silver impregnated;collagen;antimicrobial agent applied;foam;low-adherent;border dressing;multi-layer wrap  carolee, HFBr, 4\" optifoam, MLW  -MC      Periwound Care cleansed with pH balanced cleanser;dry periwound area maintained  -      Retired Wound - Properties Group Placement Date: 01/28/23  -WR Placement Time: 1613 -WR Present on " Hospital Admission: Y  -WR Side: Left  -WR Orientation: posterior  -WR Location: heel  -WR Primary Wound Type: Pressure inj  -WR    Retired Wound - Properties Group Date first assessed: 01/28/23  -WR Time first assessed: 1613  -WR Present on Hospital Admission: Y  -WR Side: Left  -WR Location: heel  -WR Primary Wound Type: Pressure inj  -WR       Wound 03/15/23 1115 Left lateral foot Traumatic    Wound - Properties Group Placement Date: 03/15/23  - Placement Time: 1115  -MC Present on Hospital Admission: N  -MC Side: Left  -MC Orientation: lateral  -MC Location: foot  -MC Primary Wound Type: Traumatic  -MC    Dressing Appearance open to air  -MC      Base clean;closed/resurfaced;dry  -MC      Drainage Amount none  -MC      Dressing Care open to air  -MC      Retired Wound - Properties Group Placement Date: 03/15/23  - Placement Time: 1115  -MC Present on Hospital Admission: N  -MC Side: Left  - Orientation: lateral  - Location: foot  -MC Primary Wound Type: Traumatic  -MC    Retired Wound - Properties Group Date first assessed: 03/15/23  - Time first assessed: 1115  -MC Present on Hospital Admission: N  -MC Side: Left  - Location: foot  -MC Primary Wound Type: Traumatic  -MC          User Key  (r) = Recorded By, (t) = Taken By, (c) = Cosigned By    Initials Name Provider Type    Lucila Mohan, PT Physical Therapist    Shi Mane, PT Physical Therapist    Aditi Shafer, RN Registered Nurse               Lymphedema     Row Name 04/11/23 1500             Lymphedema Edema Assessment    Ptting Edema Category By severity  -      Pitting Edema Severe  -         Skin Changes/Observations    Location/Assessment Lower Extremity  -      Lower Extremity Conditions bilateral:;clean;dry;shiny;fragile;crust  -      Lower Extremity Color/Pigment bilateral:;blanchable;hyperpigmented  -      Lower Extremity Skin Details 1 area of weeping to R foot dorsum. 2 areas of weeping to anterior  "L shin  -         Lymphedema Pulses/Capillary Refill    Lower Extremity Capillary Refill right:;left:;less than 3 seconds  -         Compression/Skin Care    Compression/Skin Care skin care;wrapping location;bandaging  -      Skin Care washed/dried;lotion applied  -      Wrapping Location lower extremity  -      Wrapping Location LE bilateral:;foot to knee  -      Wrapping Comments 4\" optifoam over R foot dorsum, 6\" optifoam anterior L shin over weeping areas, size 4 compressogrip doubled distsally to create gradient compression  -      Bandage Layers cotton elastic stocking- double layer (comment size)  -            User Key  (r) = Recorded By, (t) = Taken By, (c) = Cosigned By    Initials Name Provider Type    Lucila Mohan, PT Physical Therapist                WOUND DEBRIDEMENT  Total area of Debridement: 5 cm2  Debridement Site 1  Location- Site 1: L heel  Selective Debridement- Site 1: Wound Surface <20cmsq  Instruments- Site 1: #15, scapel, tweezers  Excised Tissue Description- Site 1: moderate, slough, necrotic  Bleeding- Site 1: scant, held pressure, 1 minute   Debridement Site 2  Location- Site 2: R heel  Selective Debridement- Site 2: Wound Surface <20cmsq  Instruments- Site 2: tweezers, scissors  Excised Tissue Description- Site 2: scant, slough, moderate, other (comment) (periwound MASD)  Bleeding- Site 2: none   Debridement Site 3  Location- Site 3: R 4th toe  Selective Debridement- Site 3: Wound Surface <20cmsq  Instruments- Site 3: tweezers  Excised Tissue Description- Site 3: minimum, slough, necrotic  Bleeding- Site 3: none      Therapy Education     Row Name 04/11/23 1500             Therapy Education    Education Details honey/optifoam to R knee and R toe. Extra optifoams over any areas that are actively weeping. Continue with compression, even if coming to appts. Spoke with dtr, recommended follow up with medical provider to assess R 4th toe and R heel due to new redness, " swelling, and warmth  -      Given Symptoms/condition management;Bandaging/dressing change  -      Program Reinforced;Modified;Progressed  -      How Provided Verbal;Demonstration  -      Provided to Patient;Caregiver  -      Level of Understanding Verbalized;Teach back education performed  -            User Key  (r) = Recorded By, (t) = Taken By, (c) = Cosigned By    Initials Name Provider Type     Lucila Ulrich, PT Physical Therapist                Recommendation and Plan   PT Assessment/Plan     Row Name 04/11/23 1500          PT Assessment    Functional Limitations Performance in self-care ADL;Impaired gait;Other (comment)  wound management limitations  -     Impairments Integumentary integrity;Pain;Impaired sensory integrity  -     Assessment Comments L heel area appears stable since last assessment. Pt with severe BLE edema today, with active weeping from the dorsum of the R foot and the shin of the L leg. The R heel has notably worsened, with increased necrosis, periwound MASD, and periwound warmth/erythema. The R 4th toe has a new wound after the blister noted last session apparently burst and patient has not dressed the area since that time. The new area is necrotic, mixed dry/moist, and very close to underlying tissues. The periwound area is also warm, red, and swollen. PT is concerned for infection, impaired bloodflow, or other processes that may be affecting wound progress at this time. Spoke with dtr, who is contacting the patient's providers to discuss options for assessment. Pt will continue to benefit from skilled PT wound care to promote healing.  -     Rehab Potential Fair  -     Patient/caregiver participated in establishment of treatment plan and goals Yes  -     Patient would benefit from skilled therapy intervention Yes  -MC        PT Plan    PT Frequency 2x/week  -     Physical Therapy Interventions (Optional Details) wound care;patient/family education  -      PT Plan Comments debridement, MIST, MLW, ?cultures  -           User Key  (r) = Recorded By, (t) = Taken By, (c) = Cosigned By    Initials Name Provider Type    Lucila Mohan, PT Physical Therapist                Goals   PT OP Goals     Row Name 04/11/23 1543          Time Calculation    PT Goal Re-Cert Due Date 05/18/23  -           User Key  (r) = Recorded By, (t) = Taken By, (c) = Cosigned By    Initials Name Provider Type    Lucila Mohan, PT Physical Therapist                PT Goal Re-Cert Due Date: 05/18/23            Time Calculation: Start Time: 1300  Untimed Charges  92629-[id number] comp below knee: 10  15933-Lbskyqmbi debridement: 30  Total Minutes  Untimed Charges Total Minutes: 40   Total Minutes: 40  Therapy Charges for Today     Code Description Service Date Service Provider Modifiers Qty    24676758559 HC PT MULTI LAYER COMP SYS BELOW KNEE 4/11/2023 Lucila Ulrich, PT GP 1    06084009794 HC PIPE DEBRIDE OPEN WOUND UP TO 20CM 4/11/2023 Lucila Ulrich, PT GP 1                  Lucila Ulrich, PT  4/11/2023

## 2023-04-12 ENCOUNTER — TRANSCRIBE ORDERS (OUTPATIENT)
Dept: LAB | Facility: HOSPITAL | Age: 83
End: 2023-04-12
Payer: MEDICARE

## 2023-04-12 ENCOUNTER — LAB (OUTPATIENT)
Dept: LAB | Facility: HOSPITAL | Age: 83
End: 2023-04-12
Payer: MEDICARE

## 2023-04-12 DIAGNOSIS — L02.611 ABSCESS OF FOURTH TOE, RIGHT: Primary | ICD-10-CM

## 2023-04-12 PROCEDURE — 87070 CULTURE OTHR SPECIMN AEROBIC: CPT | Performed by: INTERNAL MEDICINE

## 2023-04-12 PROCEDURE — 87205 SMEAR GRAM STAIN: CPT | Performed by: INTERNAL MEDICINE

## 2023-04-12 PROCEDURE — 87147 CULTURE TYPE IMMUNOLOGIC: CPT | Performed by: INTERNAL MEDICINE

## 2023-04-12 PROCEDURE — 87186 SC STD MICRODIL/AGAR DIL: CPT | Performed by: INTERNAL MEDICINE

## 2023-04-14 ENCOUNTER — HOSPITAL ENCOUNTER (OUTPATIENT)
Dept: PHYSICAL THERAPY | Facility: HOSPITAL | Age: 83
Setting detail: THERAPIES SERIES
Discharge: HOME | End: 2023-04-14
Payer: MEDICARE

## 2023-04-14 DIAGNOSIS — R60.0 BILATERAL LOWER EXTREMITY EDEMA: ICD-10-CM

## 2023-04-14 DIAGNOSIS — S91.302D OPEN WOUND OF LEFT HEEL, SUBSEQUENT ENCOUNTER: Primary | ICD-10-CM

## 2023-04-14 DIAGNOSIS — S91.301D OPEN WOUND OF RIGHT HEEL, SUBSEQUENT ENCOUNTER: ICD-10-CM

## 2023-04-14 DIAGNOSIS — S91.104D OPEN WOUND OF FOURTH TOE OF RIGHT FOOT, SUBSEQUENT ENCOUNTER: ICD-10-CM

## 2023-04-14 DIAGNOSIS — S81.811A ISTAP TYPE 2 SKIN TEAR OF RIGHT LOWER LEG: ICD-10-CM

## 2023-04-14 LAB
BACTERIA SPEC AEROBE CULT: ABNORMAL
GRAM STN SPEC: ABNORMAL
GRAM STN SPEC: ABNORMAL

## 2023-04-14 PROCEDURE — 29581 APPL MULTLAYER CMPRN SYS LEG: CPT

## 2023-04-14 PROCEDURE — 97597 DBRDMT OPN WND 1ST 20 CM/<: CPT

## 2023-04-14 NOTE — THERAPY PROGRESS REPORT/RE-CERT
Outpatient Rehabilitation - Wound/Debridement Progress Note  [facility]     Patient Name: Susan Anderson  : 1940  MRN: [id number]  Today's Date: 2023                 Admit Date: 2023    Visit Dx:    ICD-10-CM ICD-9-CM   1. Open wound of left heel, subsequent encounter  S91.302D V58.89     892.0   2. Open wound of right heel, subsequent encounter  S91.301D V58.89     892.0   3. Bilateral lower extremity edema  R60.0 782.3   4. ISTAP type 2 skin tear of right lower leg  S81.811A 891.0   5. Open wound of fourth toe of right foot, subsequent encounter  S91.104D V58.89     893.0     R heel      R 4th toe      R dorsal foot      R knee      L heel      L anterior leg      Patient Active Problem List   Diagnosis   • Diabetic foot ulcer   • PVD (peripheral vascular disease) ([facility])   • Osteomyelitis   • Diabetes mellitus with neuropathy   • Essential hypertension   • Stage 3a chronic kidney disease   • Pyogenic inflammation of bone   • Hyperglycemia   • Mitral valve disease   • NICM (nonischemic cardiomyopathy) ([facility])   • CAD   • Dyslipidemia   • Chronic combined systolic and diastolic heart failure    • Lumbar stenosis with neurogenic claudication   • Spondylosis of lumbar region without myelopathy or radiculopathy   • Spondylolisthesis, lumbar region   • Degeneration of lumbar or lumbosacral intervertebral disc   • Gait disturbance   • Sarcopenia   • Anemia   • Demand ischemia   • Right knee pain   • Sleep apnea   • GIB (gastrointestinal bleeding)   • Vasovagal syncope   • Paroxysmal atrial fibrillation   • Ulcer of esophagus without bleeding   • T2DM    • Iron deficiency anemia, unspecified        Past Medical History:   Diagnosis Date   • Anemia    • Anxiety    • Arthritis    • Asthma  - double pneumonia    Currently on inhaler and nebulizer   • Atrial fibrillation 2022   • Brain tumor     Benign and followed at    • Cancer     cervical cancer, skin cancer   • CHF (congestive heart  failure) 06/04/2021   • Chronic kidney disease Related to diabetes   • Clotting disorder 10/22    Upper GI bleed from blood thinners aggravate ulcers   • Coronary artery disease 6/4/2021 DX for hear failure   • COVID-19 01/28/2023   • Depression 8/22   • Diabetes mellitus 30 years    Seeing Dr. Lam 1st time Aug 19   • Dizziness 07/27/2022   • Effusion of right knee 08/12/2022   • Fall 07/27/2022   • GERD (gastroesophageal reflux disease)    • Gout    • History of medical problems 8/22    AFIB   • History of staph infection 10/2021    right toe   • History of transfusion     no reactions, 1 unit, [facility]   • HL (hearing loss)     Acoustic neuroma right   • Hx of colonoscopy    • Hyperlipidemia Reference current labs x 2-3yrs approx   • Hypertension 30 years   • Hypomagnesemia 10/06/2022   • Insomnia    • Low back pain    • Migraine    • Mitral valve disease    • Mitral valve disease    • Osteomyelitis    • Peptic ulceration 10/22    Secondary to blood thinners   • Peripheral neuropathy    • Physical deconditioning 05/17/2022   • Pneumonia due to infectious organism 06/04/2021   • Pressure injury of skin of sacral region 08/21/2022   • Renal insufficiency 2021   • Sepsis 01/28/2023   • Sleep apnea    • Syncope, unspecified syncope type 10/06/2022   • Type 2 diabetes mellitus     30 years   • Weakness 07/27/2022        Past Surgical History:   Procedure Laterality Date   • ABDOMINAL HYSTERECTOMY W/SALPINGECTOMY     • AMPUTATION  Right great toe, 1st 1/3 metatarsal -Reg 4/14/21   • AORTAGRAM N/A 04/09/2021    Procedure: AORTAGRAM WITH OR WITHOUT RUNOFFS, WITH Co2;  Surgeon: Trey Forrest MD;  Location: [facility];  Service: Vascular;  Laterality: N/A;   • AORTAGRAM N/A 09/28/2022    Procedure: CO2 ANGIOGRAM, LEFT SFA ANGIOPLASTY WITH DRUG ELUTING BALLOON, LEFT SFA STENT PLACEMENT ;  Surgeon: Trey Forrest MD;  Location: [facility];  Service: Vascular;  Laterality: N/A;  FLUORO: 13.12  DOSE  162mGy  CONTRAST: Isovue 350: 15ml   • APPENDECTOMY     • BRAIN TUMOR EXCISION      laser surgery    • CARDIAC CATHETERIZATION N/A 06/21/2021    Procedure: LEFT HEART CATH;  Surgeon: Anjum Ceballos MD;  Location:  [facility];  Service: Cardiology;  Laterality: N/A;   • CATARACT EXTRACTION W/ INTRAOCULAR LENS  IMPLANT, BILATERAL     • CHOLECYSTECTOMY     • COLONOSCOPY     • ENDOSCOPY N/A 10/07/2022    Procedure: ESOPHAGOGASTRODUODENOSCOPY;  Surgeon: Stef Veras MD;  Location:  [facility];  Service: Gastroenterology;  Laterality: N/A;   • EYE SURGERY     • FEMORAL ARTERY STENT  10/2022   • HYSTERECTOMY     • INTERVENTIONAL RADIOLOGY PROCEDURE N/A 1/15/2023    Procedure: CV INTERVENTIONAL RADIOLOGY PROCEDURE;  Surgeon: Sanket Zapata MD;  Location:  [facility];  Service: Interventional Radiology;  Laterality: N/A;   • TOE SURGERY     • TRANS METATARSAL AMPUTATION Right 04/14/2021    Procedure: AMPUTATION TRANS METATARSAL RIGHT GREAT TOE;  Surgeon: Valdez Finney MD;  Location:  [facility];  Service: Vascular;  Laterality: Right;         EVALUATION   PT Ortho     Row Name 04/14/23 1500       Subjective Comments    Subjective Comments Pt reports she went straight to [facility] after her wound care appointment last session and Dr. Blunt saw her and started her on an abx. No other issues/changes.  -       Subjective Pain    Able to rate subjective pain? yes  -    Pre-Treatment Pain Level 2  -    Post-Treatment Pain Level 4  -    Subjective Pain Comment increased with R heel palpation  -       Transfers    Comment, (Transfers) seated in electric w/c for tx  -          User Key  (r) = Recorded By, (t) = Taken By, (c) = Cosigned By    Initials Name Provider Type     López Graham, PT Physical Therapist                 [facility] Wound     Row Name 04/14/23 1500             Wound 04/11/23 1300 Right anterior knee Skin Tear    Wound - Properties Group Placement Date: 04/11/23  " - Placement Time: 1300  - Present on Hospital Admission: N  - Side: Right  - Orientation: anterior  - Location: knee  -MC Primary Wound Type: Skin tear  -MC    Wound Image Images linked: 1  -LH      Dressing Appearance moist drainage;dressing loose  -      Base moist;pink;red;yellow;slough  -      Periwound intact;dry  -      Periwound Temperature warm  -      Periwound Skin Turgor soft  -LH      Edges irregular;open  -LH      Wound Length (cm) 0.2 cm  -      Wound Width (cm) 1.5 cm  -      Wound Depth (cm) 0.1 cm  -LH      Wound Surface Area (cm^2) 0.3 cm^2  -LH      Wound Volume (cm^3) 0.03 cm^3  -      Drainage Characteristics/Odor serous  -LH      Drainage Amount small  -LH      Care, Wound cleansed with;wound cleanser;debrided;honey applied  -      Dressing Care dressing applied;low-adherent;foam;border dressing  4\" optifoam  -      Periwound Care cleansed with pH balanced cleanser;dry periwound area maintained  NoSting  -      Retired Wound - Properties Group Placement Date: 04/11/23  - Placement Time: 1300  - Present on Hospital Admission: N  - Side: Right  - Orientation: anterior  - Location: knee  - Primary Wound Type: Skin tear  -MC    Retired Wound - Properties Group Date first assessed: 04/11/23  - Time first assessed: 1300  - Present on Hospital Admission: N  - Side: Right  - Location: knee  - Primary Wound Type: Skin tear  -MC       Wound 04/11/23 1300 Right lateral fourth toe Soft Tissue Necrosis    Wound - Properties Group Placement Date: 04/11/23  - Placement Time: 1300  - Present on Hospital Admission: N  - Side: Right  - Orientation: lateral  - Location: fourth toe  - Primary Wound Type: Soft tissue  -    Wound Image Images linked: 2  -LH      Dressing Appearance open to air  -      Base necrotic;yellow;subcutaneous;slough;scab;moist;dry;black eschar  -      Periwound redness;swelling;warm;moist  -      Periwound Temperature " "warm  -LH      Periwound Skin Turgor soft  -LH      Edges irregular;open  -LH      Wound Length (cm) 1.6 cm  -LH      Wound Width (cm) 0.6 cm  -LH      Wound Depth (cm) --  obscured  -      Wound Surface Area (cm^2) 0.96 cm^2  -LH      Drainage Characteristics/Odor serous  -LH      Drainage Amount small  -      Care, Wound cleansed with;wound cleanser;debrided;honey applied  -      Dressing Care dressing applied;low-adherent;foam;border dressing  Therahoney, 2\" optifoam  -      Periwound Care cleansed with pH balanced cleanser;dry periwound area maintained  NoSTing  -      Retired Wound - Properties Group Placement Date: 04/11/23  - Placement Time: 1300  - Present on Hospital Admission: N  - Side: Right  - Orientation: lateral  - Location: fourth toe  -MC Primary Wound Type: Soft tissue  -MC    Retired Wound - Properties Group Date first assessed: 04/11/23  - Time first assessed: 1300  - Present on Hospital Admission: N  - Side: Right  - Location: fourth toe  - Primary Wound Type: Soft tissue  -MC       Wound 03/31/23 1345 Right medial heel Fissure    Wound - Properties Group Placement Date: 03/31/23  - Placement Time: 1345  -JM Side: Right  - Orientation: medial  - Location: heel  - Primary Wound Type: Fissure  -JM    Wound Image Images linked: 1  -LH      Dressing Appearance intact;moist drainage  -LH      Base moist;yellow;slough;pink;red  central necrotic area stained blue from HFB, surrounding red partial-thickness open area  -      Periwound intact;dry;redness;blanchable;warm  notably warm, erythemic periwound area  -      Periwound Temperature hot  -      Periwound Skin Turgor soft  -LH      Edges irregular;open  -LH      Wound Length (cm) 1.5 cm  -LH      Wound Width (cm) 1.5 cm  -LH      Wound Depth (cm) --  obscured  -      Wound Surface Area (cm^2) 2.25 cm^2  -LH      Drainage Characteristics/Odor yellow  -LH      Drainage Amount small  -      Care, Wound " "cleansed with;wound cleanser;debrided  -      Dressing Care dressing applied;silver impregnated;collagen;antimicrobial agent applied;foam;low-adherent;border dressing  carolee, HFBr, 4\" optifoam, MLW  -      Periwound Care cleansed with pH balanced cleanser;dry periwound area maintained  NoSting  -      Retired Wound - Properties Group Placement Date: 03/31/23  - Placement Time: 1345  -JM Side: Right  - Orientation: medial  - Location: heel  -JM Primary Wound Type: Fissure  -JM    Retired Wound - Properties Group Date first assessed: 03/31/23  - Time first assessed: 1345  -JM Side: Right  - Location: heel  -JM Primary Wound Type: Fissure  -JM       Wound 03/15/23 1115 Left lateral foot Traumatic    Wound - Properties Group Placement Date: 03/15/23  - Placement Time: 1115  -MC Present on Hospital Admission: N  -MC Side: Left  - Orientation: lateral  - Location: foot  -MC Primary Wound Type: Traumatic  -MC    Base clean;closed/resurfaced;dry  -LH      Drainage Amount none  -      Retired Wound - Properties Group Placement Date: 03/15/23  - Placement Time: 1115  -MC Present on Hospital Admission: N  -MC Side: Left  - Orientation: lateral  - Location: foot  -MC Primary Wound Type: Traumatic  -MC    Retired Wound - Properties Group Date first assessed: 03/15/23  - Time first assessed: 1115  -MC Present on Hospital Admission: N  -MC Side: Left  - Location: foot  -MC Primary Wound Type: Traumatic  -MC       Wound 01/28/23 1613 Left posterior heel Pressure Injury    Wound - Properties Group Placement Date: 01/28/23  -WR Placement Time: 1613  -WR Present on Hospital Admission: Y  -WR Side: Left  -WR Orientation: posterior  -WR Location: heel  -WR Primary Wound Type: Pressure inj  -WR    Wound Image Images linked: 1  -LH      Dressing Appearance intact;moist drainage  -LH      Base moist;necrotic;yellow;slough;pink;red;granulating  moist fibrous slough, no granulation budding noted today  -LH  " "    Periwound intact;blanchable;pink;macerated  mod maceration  -      Periwound Temperature warm  -      Periwound Skin Turgor soft  -      Edges open  -      Wound Length (cm) 1.7 cm  -      Wound Width (cm) 1.5 cm  -      Wound Depth (cm) --  obscured  -      Wound Surface Area (cm^2) 2.55 cm^2  -      Drainage Characteristics/Odor serosanguineous;yellow  -      Drainage Amount small  -      Care, Wound cleansed with;wound cleanser;debrided;honey applied  -      Dressing Care dressing applied;silver impregnated;collagen;antimicrobial agent applied;foam;low-adherent;border dressing;multi-layer wrap  carolee, HFBr, 4\" optifoam, MLW  -      Periwound Care cleansed with pH balanced cleanser;dry periwound area maintained  -      Retired Wound - Properties Group Placement Date: 01/28/23  -WR Placement Time: 1613  -WR Present on Hospital Admission: Y  -WR Side: Left  -WR Orientation: posterior  -WR Location: heel  -WR Primary Wound Type: Pressure inj  -WR    Retired Wound - Properties Group Date first assessed: 01/28/23  -WR Time first assessed: 1613  -WR Present on Hospital Admission: Y  -WR Side: Left  -WR Location: heel  -WR Primary Wound Type: Pressure inj  -WR          User Key  (r) = Recorded By, (t) = Taken By, (c) = Cosigned By    Initials Name Provider Type    Lucila Mohan, PT Physical Therapist    Shi Mane, PT Physical Therapist    WR Aditi Jeter, RN Registered Nurse     López Graham, PT Physical Therapist               Lymphedema     Row Name 04/14/23 1500             Lymphedema Edema Assessment    Ptting Edema Category By severity  -      Pitting Edema Moderate  -         Skin Changes/Observations    Location/Assessment Lower Extremity  -      Lower Extremity Conditions bilateral:;clean;dry;shiny;fragile;crust  -      Lower Extremity Color/Pigment bilateral:;blanchable;hyperpigmented  -         Lymphedema Pulses/Capillary Refill    " Capillary Refill lower extremity capillary refill  -      Lower Extremity Capillary Refill right:;left:;less than 3 seconds  -         Compression/Skin Care    Compression/Skin Care skin care;wrapping location;bandaging  -      Skin Care washed/dried;lotion applied  -      Wrapping Location lower extremity  -      Wrapping Location LE bilateral:;foot to knee  -      Wrapping Comments BLE wound dressings, size 4 compressogrip applied doubled distally for gradient compression  -            User Key  (r) = Recorded By, (t) = Taken By, (c) = Cosigned By    Initials Name Provider Type     López Graham, PT Physical Therapist                WOUND DEBRIDEMENT  Total area of Debridement: 4cm2  Debridement Site 1  Location- Site 1: L heel  Selective Debridement- Site 1: Wound Surface <20cmsq  Instruments- Site 1: #15, scapel, tweezers  Excised Tissue Description- Site 1: minimum, slough  Bleeding- Site 1: none   Debridement Site 2  Location- Site 2: R heel  Selective Debridement- Site 2: Wound Surface <20cmsq  Instruments- Site 2: tweezers, scissors  Excised Tissue Description- Site 2: minimum, slough  Bleeding- Site 2: none   Debridement Site 3  Location- Site 3: R 4th toe  Selective Debridement- Site 3: Wound Surface <20cmsq  Instruments- Site 3: tweezers  Excised Tissue Description- Site 3: minimum, slough  Bleeding- Site 3: none      Therapy Education     Row Name 04/14/23 1500             Therapy Education    Education Details Continue current dressings, may apply nosting to periwound area to help with adherence of dressings.  -      Given Symptoms/condition management;Bandaging/dressing change  -      Program Reinforced;Modified;Progressed  -      How Provided Verbal;Demonstration  -      Provided to Patient;Caregiver  -      Level of Understanding Verbalized;Teach back education performed  -            User Key  (r) = Recorded By, (t) = Taken By, (c) = Cosigned By    Initials Name  Provider Type     López Graham, PT Physical Therapist                Recommendation and Plan   PT Assessment/Plan     Row Name 23 1500          PT Assessment    Functional Limitations Performance in self-care ADL;Impaired gait;Other (comment)  wound management limitations  -     Impairments Integumentary integrity;Pain;Impaired sensory integrity  -     Assessment Comments Pt has not met any additional wound care goals this session as pt with recent positive wound culture results with beginning of course of abx. Pt has however demonstrated good improvements in BLE edema with no remaining areas of weeping drainage. Pt's BLE wounds all remain relatively unchanged with no change in wound dimensions. Pt's bilateral heel wounds with no visible granulation at this time. PT plans to continue debridement, MLW, and advanced dressings to help promote wound healing.  -     Rehab Potential Fair  -     Patient/caregiver participated in establishment of treatment plan and goals Yes  -     Patient would benefit from skilled therapy intervention Yes  -        PT Plan    PT Frequency 2x/week  -     Predicted Duration of Therapy Intervention (PT) 20 visits  -     Planned CPT's? PT SELF CARE/MGMT/TRAIN 15 MIN: 26313;PT NONSELECT DEBRIDE 15 MIN: 39771;PT PIPE DEBRIDE OPEN WOUND UP TO 20 CM: 12862;PT PIPE DEBRIDE OPEN WOUND EA ADD 20 CM: 34917;PT NLFU MIST: 67939;PT UNNA BOOT: 45900;PT MULTI LAYER COMP SYS LE;PT THER SUPP EA 15 MIN  -     Physical Therapy Interventions (Optional Details) wound care;patient/family education  -     PT Plan Comments debridement, MLW  -           User Key  (r) = Recorded By, (t) = Taken By, (c) = Cosigned By    Initials Name Provider Type     López Graham, PT Physical Therapist                Goals   PT OP Goals     Row Name 23 1528 23 1500       PT Short Term Goals    STG 1 -- Reduce L heel wound dimensions by 25% as evidence of wound healing.  -     STG 1 Progress -- Ongoing  -    STG 2 -- Increase granulation formation to 25% of wound bed or greater, to promote wound closure.  -    STG 2 Progress -- Ongoing  -       Long Term Goals    LTG 1 -- Pt/family independent with home dressing changes.  -    LTG 1 Progress -- Progressing  -    LTG 2 -- L foot wound and L proximal leg wound to be closed/resurfaced to demonstrate appropriate healing.  -    LTG 2 Progress -- Ongoing  -    LTG 3 -- Patient/family indpendent with compression use for home edema management.  -    LTG 3 Progress -- Ongoing  -    LTG 4 -- Pt will demonstrate 90% reduction in L heel wound area to indicate healing progress.  -    LTG 4 Progress -- Ongoing  -       Time Calculation    PT Goal Re-Cert Due Date 05/18/23  - 05/18/23  -          User Key  (r) = Recorded By, (t) = Taken By, (c) = Cosigned By    Initials Name Provider Type     López Graham, PT Physical Therapist                PT Goal Re-Cert Due Date: 05/18/23  PT Short Term Goals  STG 1: Reduce L heel wound dimensions by 25% as evidence of wound healing.  STG 1 Progress: Ongoing  STG 2: Increase granulation formation to 25% of wound bed or greater, to promote wound closure.  STG 2 Progress: Ongoing  Long Term Goals  LTG 1: Pt/family independent with home dressing changes.  LTG 1 Progress: Progressing  LTG 2: L foot wound and L proximal leg wound to be closed/resurfaced to demonstrate appropriate healing.  LTG 2 Progress: Ongoing  LTG 3: Patient/family indpendent with compression use for home edema management.  LTG 3 Progress: Ongoing  LTG 4: Pt will demonstrate 90% reduction in L heel wound area to indicate healing progress.  LTG 4 Progress: Ongoing      Time Calculation: Start Time: 1300  Untimed Charges  62338-[id number] comp below knee: 10  00728-Avmyqrlkf debridement: 20  Total Minutes  Untimed Charges Total Minutes: 30   Total Minutes: 30  Therapy Charges for Today     Code Description Service  Date Service Provider Modifiers Qty    44570205579 HC PIPE DEBRIDE OPEN WOUND UP TO 20CM 4/14/2023 López Graham, PT GP 1    [id number] HC PT MULTI LAYER COMP SYS BELOW KNEE 4/14/2023 López Graham, PT GP 1                  López Graham, PT  4/14/2023

## 2023-04-17 ENCOUNTER — APPOINTMENT (OUTPATIENT)
Dept: GENERAL RADIOLOGY | Facility: HOSPITAL | Age: 83
DRG: 682 | End: 2023-04-17
Payer: MEDICARE

## 2023-04-17 ENCOUNTER — APPOINTMENT (OUTPATIENT)
Dept: CT IMAGING | Facility: HOSPITAL | Age: 83
DRG: 682 | End: 2023-04-17
Payer: MEDICARE

## 2023-04-17 ENCOUNTER — HOSPITAL ENCOUNTER (INPATIENT)
Facility: HOSPITAL | Age: 83
LOS: 11 days | Discharge: HOSPICE | DRG: 682 | End: 2023-04-28
Attending: EMERGENCY MEDICINE | Admitting: STUDENT IN AN ORGANIZED HEALTH CARE EDUCATION/TRAINING PROGRAM
Payer: MEDICARE

## 2023-04-17 DIAGNOSIS — E66.9 DIABETES MELLITUS TYPE 2 IN OBESE: ICD-10-CM

## 2023-04-17 DIAGNOSIS — L03.115 CELLULITIS OF RIGHT FOOT DUE TO METHICILLIN-RESISTANT STAPHYLOCOCCUS AUREUS: Primary | ICD-10-CM

## 2023-04-17 DIAGNOSIS — L97.409 DIABETIC ULCER OF HEEL ASSOCIATED WITH TYPE 2 DIABETES MELLITUS, UNSPECIFIED LATERALITY, UNSPECIFIED ULCER STAGE: ICD-10-CM

## 2023-04-17 DIAGNOSIS — E11.69 DIABETES MELLITUS TYPE 2 IN OBESE: ICD-10-CM

## 2023-04-17 DIAGNOSIS — E11.621 DIABETIC ULCER OF HEEL ASSOCIATED WITH TYPE 2 DIABETES MELLITUS, UNSPECIFIED LATERALITY, UNSPECIFIED ULCER STAGE: ICD-10-CM

## 2023-04-17 DIAGNOSIS — E11.65 TYPE 2 DIABETES MELLITUS WITH HYPERGLYCEMIA, WITH LONG-TERM CURRENT USE OF INSULIN: ICD-10-CM

## 2023-04-17 DIAGNOSIS — N18.32 STAGE 3B CHRONIC KIDNEY DISEASE: ICD-10-CM

## 2023-04-17 DIAGNOSIS — B95.62 CELLULITIS OF RIGHT FOOT DUE TO METHICILLIN-RESISTANT STAPHYLOCOCCUS AUREUS: Primary | ICD-10-CM

## 2023-04-17 DIAGNOSIS — Z79.4 TYPE 2 DIABETES MELLITUS WITH HYPERGLYCEMIA, WITH LONG-TERM CURRENT USE OF INSULIN: ICD-10-CM

## 2023-04-17 DIAGNOSIS — R77.8 ELEVATED TROPONIN: ICD-10-CM

## 2023-04-17 DIAGNOSIS — I10 ELEVATED BLOOD PRESSURE READING WITH DIAGNOSIS OF HYPERTENSION: ICD-10-CM

## 2023-04-17 LAB
ALBUMIN SERPL-MCNC: 2.8 G/DL (ref 3.5–5.2)
ALBUMIN/GLOB SERPL: 0.9 G/DL
ALP SERPL-CCNC: 208 U/L (ref 39–117)
ALT SERPL W P-5'-P-CCNC: 22 U/L (ref 1–33)
AMMONIA BLD-SCNC: 25 UMOL/L (ref 11–51)
ANION GAP SERPL CALCULATED.3IONS-SCNC: 9 MMOL/L (ref 5–15)
ANISOCYTOSIS BLD QL: NORMAL
AST SERPL-CCNC: 40 U/L (ref 1–32)
ATMOSPHERIC PRESS: ABNORMAL MM[HG]
BACTERIA UR QL AUTO: ABNORMAL /HPF
BASE EXCESS BLDV CALC-SCNC: 7.3 MMOL/L (ref -2–2)
BASOPHILS # BLD AUTO: 0.02 10*3/MM3 (ref 0–0.2)
BASOPHILS NFR BLD AUTO: 0.2 % (ref 0–1.5)
BDY SITE: ABNORMAL
BILIRUB SERPL-MCNC: 0.6 MG/DL (ref 0–1.2)
BILIRUB UR QL STRIP: NEGATIVE
BODY TEMPERATURE: 37 C
BUN SERPL-MCNC: 80 MG/DL (ref 8–23)
BUN/CREAT SERPL: 49.1 (ref 7–25)
CALCIUM SPEC-SCNC: 8.4 MG/DL (ref 8.6–10.5)
CHLORIDE SERPL-SCNC: 102 MMOL/L (ref 98–107)
CK SERPL-CCNC: 106 U/L (ref 20–180)
CLARITY UR: CLEAR
CO2 BLDA-SCNC: 37.7 MMOL/L (ref 22–33)
CO2 SERPL-SCNC: 31 MMOL/L (ref 22–29)
COHGB MFR BLD: 1.5 %
COLOR UR: YELLOW
CREAT SERPL-MCNC: 1.63 MG/DL (ref 0.57–1)
CRP SERPL-MCNC: 8.02 MG/DL (ref 0–0.5)
D DIMER PPP FEU-MCNC: 2.1 MCGFEU/ML (ref 0–0.84)
D-LACTATE SERPL-SCNC: 1.1 MMOL/L (ref 0.5–2)
DEPRECATED RDW RBC AUTO: 68.6 FL (ref 37–54)
EGFRCR SERPLBLD CKD-EPI 2021: 31 ML/MIN/1.73
EOSINOPHIL # BLD AUTO: 0.04 10*3/MM3 (ref 0–0.4)
EOSINOPHIL NFR BLD AUTO: 0.5 % (ref 0.3–6.2)
EPAP: 0
ERYTHROCYTE [DISTWIDTH] IN BLOOD BY AUTOMATED COUNT: 18.6 % (ref 12.3–15.4)
FERRITIN SERPL-MCNC: 63.04 NG/ML (ref 13–150)
GEN 5 2HR TROPONIN T REFLEX: 310 NG/L
GLOBULIN UR ELPH-MCNC: 3.1 GM/DL
GLUCOSE BLDC GLUCOMTR-MCNC: 211 MG/DL (ref 70–130)
GLUCOSE SERPL-MCNC: 289 MG/DL (ref 65–99)
GLUCOSE UR STRIP-MCNC: ABNORMAL MG/DL
HCO3 BLDV-SCNC: 35.6 MMOL/L (ref 22–28)
HCT VFR BLD AUTO: 39.3 % (ref 34–46.6)
HGB BLD-MCNC: 11.7 G/DL (ref 12–15.9)
HGB BLDA-MCNC: 12.2 G/DL (ref 14–18)
HGB UR QL STRIP.AUTO: ABNORMAL
HOLD SPECIMEN: NORMAL
HYALINE CASTS UR QL AUTO: ABNORMAL /LPF
IMM GRANULOCYTES # BLD AUTO: 0.51 10*3/MM3 (ref 0–0.05)
IMM GRANULOCYTES NFR BLD AUTO: 6.3 % (ref 0–0.5)
INHALED O2 CONCENTRATION: 28 %
IPAP: 0
KETONES UR QL STRIP: NEGATIVE
LDH SERPL-CCNC: 527 U/L (ref 135–214)
LEUKOCYTE ESTERASE UR QL STRIP.AUTO: NEGATIVE
LYMPHOCYTES # BLD AUTO: 0.82 10*3/MM3 (ref 0.7–3.1)
LYMPHOCYTES NFR BLD AUTO: 10.1 % (ref 19.6–45.3)
Lab: ABNORMAL
MAGNESIUM SERPL-MCNC: 2.1 MG/DL (ref 1.6–2.4)
MCH RBC QN AUTO: 30.2 PG (ref 26.6–33)
MCHC RBC AUTO-ENTMCNC: 29.8 G/DL (ref 31.5–35.7)
MCV RBC AUTO: 101.6 FL (ref 79–97)
METHGB BLD QL: 0.4 %
MODALITY: ABNORMAL
MONOCYTES # BLD AUTO: 0.58 10*3/MM3 (ref 0.1–0.9)
MONOCYTES NFR BLD AUTO: 7.2 % (ref 5–12)
NEUTROPHILS NFR BLD AUTO: 6.11 10*3/MM3 (ref 1.7–7)
NEUTROPHILS NFR BLD AUTO: 75.7 % (ref 42.7–76)
NITRITE UR QL STRIP: NEGATIVE
NOTE: ABNORMAL
NOTIFIED BY: ABNORMAL
NOTIFIED WHO: ABNORMAL
NRBC BLD AUTO-RTO: 0 /100 WBC (ref 0–0.2)
NT-PROBNP SERPL-MCNC: ABNORMAL PG/ML (ref 0–1800)
NT-PROBNP SERPL-MCNC: ABNORMAL PG/ML (ref 0–1800)
OVALOCYTES BLD QL SMEAR: NORMAL
OXYHGB MFR BLDV: 31.2 %
PAW @ PEAK INSP FLOW SETTING VENT: 0 CMH2O
PCO2 BLDV: 68.9 MM HG (ref 41–51)
PH BLDV: 7.32 PH UNITS (ref 7.31–7.41)
PH UR STRIP.AUTO: 5.5 [PH] (ref 5–8)
PLAT MORPH BLD: NORMAL
PLATELET # BLD AUTO: 236 10*3/MM3 (ref 140–450)
PMV BLD AUTO: 10.2 FL (ref 6–12)
PO2 BLDV: 23.9 MM HG (ref 27–53)
POTASSIUM SERPL-SCNC: 5.7 MMOL/L (ref 3.5–5.2)
PROCALCITONIN SERPL-MCNC: 0.13 NG/ML (ref 0–0.25)
PROT SERPL-MCNC: 5.9 G/DL (ref 6–8.5)
PROT UR QL STRIP: ABNORMAL
QT INTERVAL: 400 MS
QTC INTERVAL: 444 MS
RBC # BLD AUTO: 3.87 10*6/MM3 (ref 3.77–5.28)
RBC # UR STRIP: ABNORMAL /HPF
REF LAB TEST METHOD: ABNORMAL
SODIUM SERPL-SCNC: 142 MMOL/L (ref 136–145)
SP GR UR STRIP: 1.01 (ref 1–1.03)
SQUAMOUS #/AREA URNS HPF: ABNORMAL /HPF
TOTAL RATE: 0 BREATHS/MINUTE
TROPONIN T DELTA: -21 NG/L
TROPONIN T SERPL HS-MCNC: 326 NG/L
TROPONIN T SERPL HS-MCNC: 331 NG/L
TSH SERPL DL<=0.05 MIU/L-ACNC: 3.64 UIU/ML (ref 0.27–4.2)
UROBILINOGEN UR QL STRIP: ABNORMAL
WBC # UR STRIP: ABNORMAL /HPF
WBC MORPH BLD: NORMAL
WBC NRBC COR # BLD: 8.08 10*3/MM3 (ref 3.4–10.8)
WHOLE BLOOD HOLD COAG: NORMAL
WHOLE BLOOD HOLD SPECIMEN: NORMAL

## 2023-04-17 PROCEDURE — 25010000002 DAPTOMYCIN PER 1 MG: Performed by: EMERGENCY MEDICINE

## 2023-04-17 PROCEDURE — P9612 CATHETERIZE FOR URINE SPEC: HCPCS

## 2023-04-17 PROCEDURE — 84443 ASSAY THYROID STIM HORMONE: CPT | Performed by: INTERNAL MEDICINE

## 2023-04-17 PROCEDURE — 81001 URINALYSIS AUTO W/SCOPE: CPT | Performed by: EMERGENCY MEDICINE

## 2023-04-17 PROCEDURE — 83605 ASSAY OF LACTIC ACID: CPT | Performed by: EMERGENCY MEDICINE

## 2023-04-17 PROCEDURE — 99285 EMERGENCY DEPT VISIT HI MDM: CPT

## 2023-04-17 PROCEDURE — 82140 ASSAY OF AMMONIA: CPT | Performed by: INTERNAL MEDICINE

## 2023-04-17 PROCEDURE — 82728 ASSAY OF FERRITIN: CPT | Performed by: INTERNAL MEDICINE

## 2023-04-17 PROCEDURE — 71250 CT THORAX DX C-: CPT

## 2023-04-17 PROCEDURE — 99223 1ST HOSP IP/OBS HIGH 75: CPT | Performed by: INTERNAL MEDICINE

## 2023-04-17 PROCEDURE — 83880 ASSAY OF NATRIURETIC PEPTIDE: CPT | Performed by: EMERGENCY MEDICINE

## 2023-04-17 PROCEDURE — 36415 COLL VENOUS BLD VENIPUNCTURE: CPT

## 2023-04-17 PROCEDURE — 84484 ASSAY OF TROPONIN QUANT: CPT | Performed by: INTERNAL MEDICINE

## 2023-04-17 PROCEDURE — 82805 BLOOD GASES W/O2 SATURATION: CPT

## 2023-04-17 PROCEDURE — 94660 CPAP INITIATION&MGMT: CPT

## 2023-04-17 PROCEDURE — 84484 ASSAY OF TROPONIN QUANT: CPT | Performed by: EMERGENCY MEDICINE

## 2023-04-17 PROCEDURE — 85007 BL SMEAR W/DIFF WBC COUNT: CPT | Performed by: EMERGENCY MEDICINE

## 2023-04-17 PROCEDURE — 71045 X-RAY EXAM CHEST 1 VIEW: CPT

## 2023-04-17 PROCEDURE — 87040 BLOOD CULTURE FOR BACTERIA: CPT | Performed by: EMERGENCY MEDICINE

## 2023-04-17 PROCEDURE — 86140 C-REACTIVE PROTEIN: CPT | Performed by: INTERNAL MEDICINE

## 2023-04-17 PROCEDURE — 83615 LACTATE (LD) (LDH) ENZYME: CPT | Performed by: INTERNAL MEDICINE

## 2023-04-17 PROCEDURE — 85379 FIBRIN DEGRADATION QUANT: CPT | Performed by: INTERNAL MEDICINE

## 2023-04-17 PROCEDURE — 82962 GLUCOSE BLOOD TEST: CPT

## 2023-04-17 PROCEDURE — 82550 ASSAY OF CK (CPK): CPT | Performed by: INTERNAL MEDICINE

## 2023-04-17 PROCEDURE — 93005 ELECTROCARDIOGRAM TRACING: CPT | Performed by: EMERGENCY MEDICINE

## 2023-04-17 PROCEDURE — 94799 UNLISTED PULMONARY SVC/PX: CPT

## 2023-04-17 PROCEDURE — 83735 ASSAY OF MAGNESIUM: CPT | Performed by: EMERGENCY MEDICINE

## 2023-04-17 PROCEDURE — 80053 COMPREHEN METABOLIC PANEL: CPT | Performed by: EMERGENCY MEDICINE

## 2023-04-17 PROCEDURE — 85025 COMPLETE CBC W/AUTO DIFF WBC: CPT | Performed by: EMERGENCY MEDICINE

## 2023-04-17 PROCEDURE — 83880 ASSAY OF NATRIURETIC PEPTIDE: CPT | Performed by: INTERNAL MEDICINE

## 2023-04-17 PROCEDURE — 84145 PROCALCITONIN (PCT): CPT | Performed by: EMERGENCY MEDICINE

## 2023-04-17 RX ORDER — TRAMADOL HYDROCHLORIDE 50 MG/1
25 TABLET ORAL EVERY 12 HOURS PRN
Status: DISCONTINUED | OUTPATIENT
Start: 2023-04-17 | End: 2023-04-28 | Stop reason: HOSPADM

## 2023-04-17 RX ORDER — BUMETANIDE 0.25 MG/ML
1 INJECTION INTRAMUSCULAR; INTRAVENOUS
Status: DISCONTINUED | OUTPATIENT
Start: 2023-04-18 | End: 2023-04-19

## 2023-04-17 RX ORDER — MINOCYCLINE HYDROCHLORIDE 100 MG/1
100 CAPSULE ORAL 2 TIMES DAILY
Status: ON HOLD | COMMUNITY
End: 2023-04-28 | Stop reason: SDUPTHER

## 2023-04-17 RX ORDER — BUMETANIDE 0.25 MG/ML
2 INJECTION INTRAMUSCULAR; INTRAVENOUS ONCE
Status: COMPLETED | OUTPATIENT
Start: 2023-04-17 | End: 2023-04-17

## 2023-04-17 RX ORDER — LOSARTAN POTASSIUM 50 MG/1
50 TABLET ORAL
Status: DISCONTINUED | OUTPATIENT
Start: 2023-04-18 | End: 2023-04-18

## 2023-04-17 RX ORDER — SODIUM CHLORIDE 0.9 % (FLUSH) 0.9 %
10 SYRINGE (ML) INJECTION AS NEEDED
Status: DISCONTINUED | OUTPATIENT
Start: 2023-04-17 | End: 2023-04-28 | Stop reason: HOSPADM

## 2023-04-17 RX ORDER — ATORVASTATIN CALCIUM 40 MG/1
40 TABLET, FILM COATED ORAL NIGHTLY
Status: DISCONTINUED | OUTPATIENT
Start: 2023-04-17 | End: 2023-04-28 | Stop reason: HOSPADM

## 2023-04-17 RX ORDER — CLOPIDOGREL BISULFATE 75 MG/1
75 TABLET ORAL DAILY
Status: DISCONTINUED | OUTPATIENT
Start: 2023-04-18 | End: 2023-04-28 | Stop reason: HOSPADM

## 2023-04-17 RX ORDER — AMOXICILLIN 250 MG
1 CAPSULE ORAL NIGHTLY PRN
Status: DISCONTINUED | OUTPATIENT
Start: 2023-04-17 | End: 2023-04-18

## 2023-04-17 RX ORDER — INSULIN LISPRO 100 [IU]/ML
0-7 INJECTION, SOLUTION INTRAVENOUS; SUBCUTANEOUS
Status: DISCONTINUED | OUTPATIENT
Start: 2023-04-18 | End: 2023-04-28 | Stop reason: HOSPADM

## 2023-04-17 RX ORDER — POLYETHYLENE GLYCOL 3350 17 G/17G
17 POWDER, FOR SOLUTION ORAL DAILY
Status: DISCONTINUED | OUTPATIENT
Start: 2023-04-18 | End: 2023-04-28 | Stop reason: HOSPADM

## 2023-04-17 RX ORDER — CLONAZEPAM 0.5 MG/1
0.5 TABLET ORAL 2 TIMES DAILY PRN
Status: DISCONTINUED | OUTPATIENT
Start: 2023-04-17 | End: 2023-04-24

## 2023-04-17 RX ADMIN — TRAMADOL HYDROCHLORIDE 25 MG: 50 TABLET, COATED ORAL at 21:54

## 2023-04-17 RX ADMIN — ATORVASTATIN CALCIUM 40 MG: 40 TABLET, FILM COATED ORAL at 21:54

## 2023-04-17 RX ADMIN — APIXABAN 2.5 MG: 2.5 TABLET, FILM COATED ORAL at 21:54

## 2023-04-17 RX ADMIN — SODIUM CHLORIDE 500 ML: 9 INJECTION, SOLUTION INTRAVENOUS at 17:31

## 2023-04-17 RX ADMIN — CLONAZEPAM 0.5 MG: 0.5 TABLET ORAL at 21:54

## 2023-04-17 RX ADMIN — DAPTOMYCIN 300 MG: 500 INJECTION, POWDER, LYOPHILIZED, FOR SOLUTION INTRAVENOUS at 17:31

## 2023-04-17 RX ADMIN — Medication 10 ML: at 21:55

## 2023-04-17 RX ADMIN — BUMETANIDE 2 MG: 0.25 INJECTION, SOLUTION INTRAMUSCULAR; INTRAVENOUS at 21:54

## 2023-04-17 NOTE — ED PROVIDER NOTES
Subjective   History of Present Illness  Patient is an 83-year-old female presenting to the emergency department with 4 days of progressively worsening somnolence, lethargy, and generalized fatigue.  Patient on 25 March was diagnosed with urinary tract infection based on symptoms and started on Macrobid.  They report about 10 days ago she developed redness of wounds on the right foot.  The patient has chronic heel wounds which have been managed by skin care.  4 days ago, they changed the antibiotics secondary to a culture demonstrating MRSA.  Patient has been going downhill since per her family.  The family has a significant and complex past medical history including atrial fibrillation CHF chronic kidney disease coronary artery disease, diabetes and hyperlipidemia.  Patient is on chronic home oxygen.    History provided by:  Patient and relative      Review of Systems    Past Medical History:   Diagnosis Date   • Anemia 8/22   • Anxiety    • Arthritis    • Asthma 6/4 - double pneumonia    Currently on inhaler and nebulizer   • Atrial fibrillation 08/21/2022   • Brain tumor     Benign and followed at    • Cancer     cervical cancer, skin cancer   • CHF (congestive heart failure) 06/04/2021   • Chronic kidney disease Related to diabetes   • Clotting disorder 10/22    Upper GI bleed from blood thinners aggravate ulcers   • Coronary artery disease 6/4/2021 DX for hear failure   • COVID-19 01/28/2023   • Depression 8/22   • Diabetes mellitus 30 years    Seeing Dr. Lam 1st time Aug 19   • Dizziness 07/27/2022   • Effusion of right knee 08/12/2022   • Fall 07/27/2022   • GERD (gastroesophageal reflux disease)    • Gout    • History of medical problems 8/22    AFIB   • History of staph infection 10/2021    right toe   • History of transfusion     no reactions, 1 unit, [facility]   • HL (hearing loss)     Acoustic neuroma right   • Hx of colonoscopy    • Hyperlipidemia Reference current labs x 2-3yrs approx   • Hypertension 30  years   • Hypomagnesemia 10/06/2022   • Insomnia    • Low back pain    • Migraine    • Mitral valve disease    • Mitral valve disease    • Osteomyelitis    • Peptic ulceration 10/22    Secondary to blood thinners   • Peripheral neuropathy    • Physical deconditioning 05/17/2022   • Pneumonia due to infectious organism 06/04/2021   • Pressure injury of skin of sacral region 08/21/2022   • Renal insufficiency 2021   • Sepsis 01/28/2023   • Sleep apnea    • Syncope, unspecified syncope type 10/06/2022   • Type 2 diabetes mellitus     30 years   • Weakness 07/27/2022       Allergies   Allergen Reactions   • Vancomycin Other (See Comments)     Acute Kidney Injury, requested by ID   • Baclofen Other (See Comments) and Hallucinations     PSYCHOSIS-POA REFUSES ADMINISTRATION OF THIS MED.   • Cephalexin Nausea Only   • Erythromycin Base Nausea Only   • Melatonin Other (See Comments)     Nightmares   • Oxycodone Nausea Only   • Protonix [Pantoprazole] Itching and Rash   • Sulfa Antibiotics Nausea Only       Past Surgical History:   Procedure Laterality Date   • ABDOMINAL HYSTERECTOMY W/SALPINGECTOMY     • AMPUTATION  Right great toe, 1st 1/3 metatarsal -[place] 4/14/21   • AORTAGRAM N/A 04/09/2021    Procedure: AORTAGRAM WITH OR WITHOUT RUNOFFS, WITH Co2;  Surgeon: Trey Forrest MD;  Location: [facility];  Service: Vascular;  Laterality: N/A;   • AORTAGRAM N/A 09/28/2022    Procedure: CO2 ANGIOGRAM, LEFT SFA ANGIOPLASTY WITH DRUG ELUTING BALLOON, LEFT SFA STENT PLACEMENT ;  Surgeon: Trey Forrest MD;  Location: [facility];  Service: Vascular;  Laterality: N/A;  FLUORO: 13.12  DOSE 162mGy  CONTRAST: Isovue 350: 15ml   • APPENDECTOMY     • BRAIN TUMOR EXCISION      laser surgery    • CARDIAC CATHETERIZATION N/A 06/21/2021    Procedure: LEFT HEART CATH;  Surgeon: Anjum Ceballos MD;  Location: [facility] CATH INVASIVE LOC[facility];  Service: Cardiology;  Laterality: N/A;   • CATARACT EXTRACTION W/ INTRAOCULAR LENS   IMPLANT, BILATERAL     • CHOLECYSTECTOMY     • COLONOSCOPY     • ENDOSCOPY N/A 10/07/2022    Procedure: ESOPHAGOGASTRODUODENOSCOPY;  Surgeon: Stfe Veras MD;  Location:  [facility];  Service: Gastroenterology;  Laterality: N/A;   • EYE SURGERY     • FEMORAL ARTERY STENT  10/2022   • HYSTERECTOMY     • INTERVENTIONAL RADIOLOGY PROCEDURE N/A 1/15/2023    Procedure: CV INTERVENTIONAL RADIOLOGY PROCEDURE;  Surgeon: Sanket Zapata MD;  Location:  [facility] CATH INVASIVE LOCATION;  Service: Interventional Radiology;  Laterality: N/A;   • TOE SURGERY     • TRANS METATARSAL AMPUTATION Right 04/14/2021    Procedure: AMPUTATION TRANS METATARSAL RIGHT GREAT TOE;  Surgeon: Valdez Finney MD;  Location:  [facility];  Service: Vascular;  Laterality: Right;       Family History   Problem Relation Age of Onset   • Diabetes Mother         Type II   • Heart disease Father    • Heart attack Father    • Coronary artery disease Father    • Diabetes Father         Type II   • Diabetes Sister    • Diabetes Maternal Grandmother    • Diabetes Maternal Grandfather    • Diabetes Paternal Grandmother    • Diabetes Paternal Grandfather        Social History     Socioeconomic History   • Marital status:    • Number of children: 1   Tobacco Use   • Smoking status: Never   • Smokeless tobacco: Never   Vaping Use   • Vaping Use: Never used   Substance and Sexual Activity   • Alcohol use: Not Currently     Comment: Social drinking when not recovering from hospitalization   • Drug use: Never   • Sexual activity: Not Currently     Birth control/protection: None           Objective   Physical Exam  Vitals and nursing note reviewed.   Constitutional:       Appearance: She is obese. She is ill-appearing.      Interventions: Nasal cannula in place.   Cardiovascular:      Rate and Rhythm: Normal rate. Rhythm irregular.      Pulses: Normal pulses.   Pulmonary:      Effort: Pulmonary effort is normal. No respiratory distress.   Abdominal:       Palpations: Abdomen is soft.      Tenderness: There is no abdominal tenderness.   Musculoskeletal:      Cervical back: Normal range of motion.   Skin:     General: Skin is warm and dry.      Comments: Erythema with tenderness on the dorsal aspects of the right foot at the third and fourth digits.  There is also 2 chronic wounds of the heels bilaterally that demonstrate no evidence of acute cellulitis.   Neurological:      Mental Status: She is oriented to person, place, and time. She is lethargic.      GCS: GCS eye subscore is 3. GCS verbal subscore is 5. GCS motor subscore is 6.   Psychiatric:         Mood and Affect: Mood normal.         Behavior: Behavior normal.         Procedures           ED Course  ED Course as of 04/17/23 1657   Mon Apr 17, 2023   1643 Creatinine(!): 1.63  Stable chronic kidney disease noted. [RS]   1656 Patient is a chronically ill 83-year-old female with significant comorbid medical conditions with known diagnosis of MRSA cellulitis recently.  Patient with increased somnolence.  She also has a chronic kidney disease.  Also elevated troponin level with no comparisons.  We will plan admission for further evaluation and management.  Hospitalist messaged for admission. [RS]      ED Course User Index  [RS] Kings Herrera MD                                           Medical Decision Making  Cellulitis of right foot due to methicillin-resistant Staphylococcus aureus: acute illness or injury  Diabetes mellitus type 2 in obese: chronic illness or injury  Diabetic ulcer of heel associated with type 2 diabetes mellitus, unspecified laterality, unspecified ulcer stage: chronic illness or injury  Elevated blood pressure reading with diagnosis of hypertension: acute illness or injury  Elevated troponin: acute illness or injury  Stage 3b chronic kidney disease: chronic illness or injury  Amount and/or Complexity of Data Reviewed  Independent Historian: caregiver and EMS  External Data Reviewed:  notes.  Labs: ordered. Decision-making details documented in ED Course.  Radiology: ordered.  ECG/medicine tests: ordered.      Risk  Prescription drug management.  Decision regarding hospitalization.          Final diagnoses:   Cellulitis of right foot due to methicillin-resistant Staphylococcus aureus   Diabetic ulcer of heel associated with type 2 diabetes mellitus, unspecified laterality, unspecified ulcer stage   Stage 3b chronic kidney disease   Diabetes mellitus type 2 in obese   Elevated troponin   Elevated blood pressure reading with diagnosis of hypertension       ED Disposition  ED Disposition     ED Disposition   Decision to Admit    Condition   --    Comment   --             No follow-up provider specified.       Medication List      No changes were made to your prescriptions during this visit.          Kings Herrera MD  04/17/23 1775

## 2023-04-17 NOTE — LETTER
[facility]  [address] [id number]  [phone]        April 27, 2023      Patient: Susan Anderson  YOB: 1940  Date of Visit: 4/17/2023      [facility] team referral    Referring: Dr. Macy Santana, RN

## 2023-04-17 NOTE — LETTER
EMS Transport Request  For use at [facility], [place], [place], and [place] only   Patient Name: Susan Anderson : 1940   Weight:79.6 kg (175 lb 8 oz) Pick-up Location[facility] BLS/ALS: BLS   Insurance: [employer] REPLACEMENT Auth End Date: 2023   Pre-Cert #: D/C Summary complete: no   Destination: [place]   Contact Precautions: MRSA ESBL   Equipment (O2, Fluids, etc.): oxygen 3.5 liters   Arrive By Date/Time:  1:30 pm Stretcher/WC: stretcher   CM Requesting: AMELIA Andres Ext: [phone]   Notes/Medical Necessity: Weakness and requires transportation     ______________________________________________________________________    *Only 2 patient bags OR 1 carry-on size bag are permitted.  Wheelchairs and walkers CANNOT transported with the patient. Acknowledge: yes

## 2023-04-17 NOTE — LETTER
EMS Transport Request  For use at [facility], and [place] only   Patient Name: Susan Anderson : 1940   Weight:78.6 kg (173 lb 3.2 oz) Pick-up Location: [facility] BLS/ALS: BLS/ALS: BLS   Insurance: [employer] MEDICARE REPLACEMENT Auth End Date:    Pre-Cert #: D/C Summary complete:    Destination: Home How many stairs 0, Will the patient be on the main level yes, Is there a ramp available no, Can the patient stand and pivot no, Address [address] [place] [address], and Name/contact number for who will be present Daughter, Katie  [phone]   Contact Precautions: Other contact isolation for ESBL and MRSA   Equipment (O2, Fluids, etc.): O2, settings 2L NC   Arrive By Date/Time: today, after noon Stretcher/WC: Stretcher   CM Requesting: Ancelmo Leal RN Ext: [phone]   Notes/Medical Necessity: unable to stand/pivot,  dc home with hospice for EOL care      ______________________________________________________________________    *Only 2 patient bags OR 1 carry-on size bag are permitted.  Wheelchairs and walkers CANNOT transported with the patient. Acknowledge: Yes

## 2023-04-17 NOTE — Clinical Note
Level of Care: Telemetry [5]   Diagnosis: Cellulitis of right foot due to methicillin-resistant Staphylococcus aureus [[id number]]

## 2023-04-17 NOTE — H&P
[facility] Medicine Services  HISTORY AND PHYSICAL    Patient Name: Susan Anderson  : 1940  MRN: [id number]  Primary Care Physician: Arleen Doherty MD    Subjective   Subjective     Chief Complaint: decreased alertness    HPI:  Susan Anderson is a 82 y.o. chronically ill female w/ HTN, HLD, CKD3, CAD, PAD, HFrEF (41-45%), PUD/GIB, pAfib, DM2, with 6 admissions since 2022 (predominantly for CHF). She was admitted 1/3- after PEA arrest requiring CPR after receiving propofol for EGD; and then COVID and acute renal failure requiring initiation of dialysis. Since then she has been battling bilateral heel wounds and been following with [facility] and Dr Finney and outpatient PT. She has had peripheral stent placed as well.  Over the last week, she was treated for a UTI with macrobid. She has had increasing redness near her wounds. And increasing sleepiness, shortness of breath and has a new oxygen requirement. She denies any chest pain, heaviness or tightness. Her primary complaint is sleepiness.      Review of Systems   Patient denies weight loss, headaches, changes in vision, fever, chills, sore throat, cough, sputum or hemoptysis, nausea or vomiting, diarrhea, abdominal pain or distension, change in urine output or habits, joint pain, rash, itching or bleeding.    Otherwise complete ROS is negative except as mentioned in the HPI.    Personal History     Past Medical History:   Diagnosis Date   • Anemia    • Anxiety    • Arthritis    • Asthma  - double pneumonia    Currently on inhaler and nebulizer   • Atrial fibrillation 2022   • Brain tumor     Benign and followed at    • Cancer     cervical cancer, skin cancer   • CHF (congestive heart failure) 2021   • Chronic kidney disease Related to diabetes   • Clotting disorder 10/22    Upper GI bleed from blood thinners aggravate ulcers   • Coronary artery disease 2021 DX for hear failure   • COVID-19  01/28/2023   • Depression 8/22   • Diabetes mellitus 30 years    Seeing Dr. Lam 1st time Aug 19   • Dizziness 07/27/2022   • Effusion of right knee 08/12/2022   • Fall 07/27/2022   • GERD (gastroesophageal reflux disease)    • Gout    • History of medical problems 8/22    AFIB   • History of staph infection 10/2021    right toe   • History of transfusion     no reactions, 1 unit, [facility]   • HL (hearing loss)     Acoustic neuroma right   • Hx of colonoscopy    • Hyperlipidemia Reference current labs x 2-3yrs approx   • Hypertension 30 years   • Hypomagnesemia 10/06/2022   • Insomnia    • Low back pain    • Migraine    • Mitral valve disease    • Mitral valve disease    • Osteomyelitis    • Peptic ulceration 10/22    Secondary to blood thinners   • Peripheral neuropathy    • Physical deconditioning 05/17/2022   • Pneumonia due to infectious organism 06/04/2021   • Pressure injury of skin of sacral region 08/21/2022   • Renal insufficiency 2021   • Sepsis 01/28/2023   • Sleep apnea    • Syncope, unspecified syncope type 10/06/2022   • Type 2 diabetes mellitus     30 years   • Weakness 07/27/2022       Past Surgical History:   Procedure Laterality Date   • ABDOMINAL HYSTERECTOMY W/SALPINGECTOMY     • AMPUTATION  Right great toe, 1st 1/3 metatarsal -[place] 4/14/21   • AORTAGRAM N/A 04/09/2021    Procedure: AORTAGRAM WITH OR WITHOUT RUNOFFS, WITH Co2;  Surgeon: Trey Forrest MD;  Location: [facility];  Service: Vascular;  Laterality: N/A;   • AORTAGRAM N/A 09/28/2022    Procedure: CO2 ANGIOGRAM, LEFT SFA ANGIOPLASTY WITH DRUG ELUTING BALLOON, LEFT SFA STENT PLACEMENT ;  Surgeon: Trey Forrest MD;  Location:  [facility];  Service: Vascular;  Laterality: N/A;  FLUORO: 13.12  DOSE 162mGy  CONTRAST: Isovue 350: 15ml   • APPENDECTOMY     • BRAIN TUMOR EXCISION      laser surgery    • CARDIAC CATHETERIZATION N/A 06/21/2021    Procedure: LEFT HEART CATH;  Surgeon: Anjum Ceballos MD;  Location: [facility] CATH INVASIVE  "LOCATION;  Service: Cardiology;  Laterality: N/A;   • CATARACT EXTRACTION W/ INTRAOCULAR LENS  IMPLANT, BILATERAL     • CHOLECYSTECTOMY     • COLONOSCOPY     • ENDOSCOPY N/A 10/07/2022    Procedure: ESOPHAGOGASTRODUODENOSCOPY;  Surgeon: Stef Veras MD;  Location:  [facility];  Service: Gastroenterology;  Laterality: N/A;   • EYE SURGERY     • FEMORAL ARTERY STENT  10/2022   • HYSTERECTOMY     • INTERVENTIONAL RADIOLOGY PROCEDURE N/A 1/15/2023    Procedure: CV INTERVENTIONAL RADIOLOGY PROCEDURE;  Surgeon: Sanket Zapata MD;  Location:  [facility] CATH INVASIVE LOCATION;  Service: Interventional Radiology;  Laterality: N/A;   • TOE SURGERY     • TRANS METATARSAL AMPUTATION Right 04/14/2021    Procedure: AMPUTATION TRANS METATARSAL RIGHT GREAT TOE;  Surgeon: Valdez Finney MD;  Location:  [facility] OR;  Service: Vascular;  Laterality: Right;       Family History: family history includes Coronary artery disease in her father; Diabetes in her father, maternal grandfather, maternal grandmother, mother, paternal grandfather, paternal grandmother, and sister; Heart attack in her father; Heart disease in her father.     Social History:  reports that she has never smoked. She has never used smokeless tobacco. She reports that she does not currently use alcohol. She reports that she does not use drugs.  She has been living at home with caretakers. Her daughter helps a lot as well.  Medications:  Lancets, Pen Needles 3/16\", acetaminophen, apixaban, atorvastatin, bumetanide, clopidogrel, clotrimazole-betamethasone, cyclobenzaprine, gabapentin, glucose blood, guaiFENesin, hydrOXYzine, insulin glargine, insulin lispro, losartan, metoprolol succinate XL, minocycline, multivitamin with minerals, omeprazole, ondansetron ODT, polyethylene glycol, sennosides-docusate, sertraline, and traMADol    Allergies   Allergen Reactions   • Vancomycin Other (See Comments)     Acute Kidney Injury, requested by ID   • Baclofen Other (See " Comments) and Hallucinations     PSYCHOSIS-POA REFUSES ADMINISTRATION OF THIS MED.   • Cephalexin Nausea Only   • Erythromycin Base Nausea Only   • Melatonin Other (See Comments)     Nightmares   • Oxycodone Nausea Only   • Protonix [Pantoprazole] Itching and Rash   • Sulfa Antibiotics Nausea Only       Objective   Objective     Vital Signs:   Temp:  [98 °F (36.7 °C)] 98 °F (36.7 °C)  Heart Rate:  [62-68] 68  Resp:  [16-26] 16  BP: (153-176)/() 153/86  Flow (L/min):  [3] 3    Constitutional: somnolent, hard of hearing, interactive and pleasant  Eyes: clear sclerae, no conjunctival injection  HENT: NCAT, mucous membranes dry  Neck: no masses or lymphadenopathy, trachea midline  Respiratory: distant breath sounds bilaterally, respirations increased  Cardiovascular: IRREG, no murmurs appreciated, palpable peripheral pulses  Abdomen:  soft, no HSM or masses palpable, not tender or distended  Musculoskeletal: both legs wrapped, chronic insufficency, , cool but not cold.  Neurologic: Oriented x 3,                       Strength symmetric in all extremities- generally weak                     Cranial Nerves grossly intact, speech clear-- slow but coherent  Skin: No rashes or jaundice  Psychiatric: Appropriate mood, insight    Result Review:  I have personally reviewed the results from the time of this admission   to 4/18/2023 00:35 EDT and agree with these findings:  [x]  Laboratory  [x]  Microbiology  [x]  Radiology  [x]  EKG/Telemetry   []  Cardiology/Vascular   []  Pathology  [x]  Old records  []  Other:  Most notable findings include: increased LDH, ddimer, potassium, troponin, but trending down, elevated BNP      LAB RESULTS:      Lab 04/17/23  1517   WBC 8.08   HEMOGLOBIN 11.7*   HEMATOCRIT 39.3   PLATELETS 236   NEUTROS ABS 6.11   IMMATURE GRANS (ABS) 0.51*   LYMPHS ABS 0.82   MONOS ABS 0.58   EOS ABS 0.04   .6*   CRP 8.02*   PROCALCITONIN 0.13   LACTATE 1.1   *   D DIMER QUANT 2.10*          Lab 04/17/23  1841 04/17/23  1517   SODIUM  --  142   POTASSIUM  --  5.7*   CHLORIDE  --  102   CO2  --  31.0*   ANION GAP  --  9.0   BUN  --  80*   CREATININE  --  1.63*   EGFR  --  31.0*   GLUCOSE  --  289*   CALCIUM  --  8.4*   MAGNESIUM  --  2.1   TSH 3.640  --          Lab 04/17/23  1517   TOTAL PROTEIN 5.9*   ALBUMIN 2.8*   GLOBULIN 3.1   ALT (SGPT) 22   AST (SGOT) 40*   BILIRUBIN 0.6   ALK PHOS 208*         Lab 04/17/23 2027 04/17/23  1841 04/17/23  1517   PROBNP  --   --  19,401.0*  18,084.0*   HSTROP T 310* 331* 326*             Lab 04/17/23  1517   FERRITIN 63.04         Lab 04/18/23  0024 04/17/23  1843   FIO2 21 28   CARBOXYHEMOGLOBIN (VENOUS) 1.7 1.5     Brief Urine Lab Results  (Last result in the past 365 days)      Color   Clarity   Blood   Leuk Est   Nitrite   Protein   CREAT   Urine HCG        04/17/23 1528 Yellow   Clear   Trace   Negative   Negative   >=300 mg/dL (3+)                   CT Chest Without Contrast Diagnostic    Result Date: 4/17/2023  CT CHEST WO CONTRAST DIAGNOSTIC Date of Exam: 4/17/2023 7:49 PM EDT Indication: Pneumonia, complication suspected, xray done. Comparison: 4/17/2023, 1/20/2023 Technique: Axial CT images were obtained of the chest without contrast administration.  Reconstructed coronal and sagittal images were also obtained. Automated exposure control and iterative construction methods were used. Findings: Hilum and Mediastinum: No pathologically enlarged lymph nodes. The heart appears enlarged. No pericardial effusion.  Unremarkable thoracic aorta and pulmonary arteries. Lung Parenchyma and Pleura: Large bilateral pleural effusions are present. Intralobular septal thickening is present. Atelectatic changes are present within the bilateral lower lobes. No definite consolidation identified though evaluation is limited due to significant atelectasis. Atherosclerotic calcifications are present including within the pulmonary arteries. Granulomatous calcifications  are present. Upper Abdomen: No acute process. Soft tissues: Unremarkable. Osseous structures: No aggressive focal lytic or sclerotic osseous lesions.     Impression: Impression: 1. At least moderate pulmonary edema pattern with large bilateral pleural effusions and bibasilar atelectasis. There is cardiomegaly. No definite consolidation identified though significant atelectatic changes are present. Superimposed pneumonia cannot be excluded. 2. Ancillary findings as described above. Electronically Signed: Karen Rey  4/17/2023 8:10 PM EDT  Workstation ID: [id number]    XR Chest 1 View    Result Date: 4/17/2023  XR CHEST 1 VW Date of Exam: 4/17/2023 3:46 PM EDT Indication: Weak/Dizzy/AMS triage protocol. Comparison: January 28, 2023 FINDINGS: There is a suspected moderate right and a small left pleural effusion. There are basilar predominant opacities with prominent pulmonary vasculature. The heart appears enlarged, as before. Right IJ catheter is no longer seen.  No pneumothorax is identified.     Impression: 1.Moderate right and small left pleural effusions. 2.Basilar predominant opacities with cardiomegaly and prominent pulmonary vasculature, suspicious for edema and atelectasis.  Electronically Signed: Billy Hadley  4/17/2023 4:11 PM EDT  Workstation ID: [id number]      Results for orders placed during the hospital encounter of 01/03/23    Adult Transthoracic Echo Complete W/ Cont if Necessary Per Protocol    Interpretation Summary  •  Left ventricular systolic function is moderately decreased. Left ventricular ejection fraction appears to be 41 - 45%.  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  •  Mild mitral regurgitation.  •  Trace tricuspid regurgitation.  •  There is a moderate sized left pleural effusion.      The patient has started, but not completed, their COVID-19 vaccination series.    Assessment & Plan   Assessment / Plan       Cellulitis of right foot due to  methicillin-resistant Staphylococcus aureus    Acute on chronic systolic CHF (congestive heart failure)    Acute respiratory failure with hypoxia and hypercarbia    Diabetic foot ulcer    PVD (peripheral vascular disease) ([facility])    Diabetes mellitus with neuropathy    Essential hypertension    Stage 3a chronic kidney disease    Mitral valve disease    NICM (nonischemic cardiomyopathy) ([facility])    Dyslipidemia    Chronic combined systolic and diastolic heart failure     Paroxysmal atrial fibrillation    T2DM       Assessment & Plan:  Susan Anderson is a 82 y.o. chronically ill female w/ HTN, HLD, CKD3, CAD, PAD, HFrEF (41-45%), PUD/GIB, pAfib, DM2, with 6 admissions since August 2022 (predominantly for CHF). She was admitted 1/3-1/20 after PEA arrest requiring CPR after receiving propofol for EGD; and then COVID and acute renal failure requiring initiation of dialysis. Since then she has been battling bilateral heel wounds and been following with [facility] and Dr Finney and outpatient PT. She has had peripheral stent placed as well.    Metabolic encephalopathy  -possible related to CO2 narcosis  -try BIPAP  -follow gas    A/C HFrEF- ED 40%  Bilateral pleural effusions  -diuresis with bumex  -monitor electrolytes  -hypekalmic today  --HO renal failure requiring dialysis  -troponin normalizing    Chronic bilateral heel wounds  -patient due to see Dr Finney  And Yanci, will consult  -WOC  -cont dapto for now  -consider further imaging    Deconditioned  -PT/OT    T2DM  -SSI    DVT prophylaxis:  Medical DVT prophylaxis orders are present.    CODE STATUS:  Code Status (Patient has no pulse and is not breathing): No CPR (Do Not Attempt to Resuscitate)  Medical Interventions (Patient has pulse or is breathing): Full    Expected Discharge  Expected Discharge Date and Time     Expected Discharge Date Expected Discharge Time    Apr 21, 2023           Electronically signed by Faiza Angulo MD 04/18/23 00:35 EDT

## 2023-04-18 PROBLEM — I24.8 DEMAND ISCHEMIA: Status: RESOLVED | Noted: 2022-07-27 | Resolved: 2023-04-18

## 2023-04-18 PROBLEM — J96.01 ACUTE RESPIRATORY FAILURE WITH HYPOXIA AND HYPERCARBIA: Status: ACTIVE | Noted: 2023-01-01

## 2023-04-18 PROBLEM — I24.89 DEMAND ISCHEMIA: Status: RESOLVED | Noted: 2022-07-27 | Resolved: 2023-01-01

## 2023-04-18 PROBLEM — M62.84 SARCOPENIA: Status: RESOLVED | Noted: 2022-05-17 | Resolved: 2023-04-18

## 2023-04-18 PROBLEM — R73.9 HYPERGLYCEMIA: Status: RESOLVED | Noted: 2021-04-15 | Resolved: 2023-04-18

## 2023-04-18 PROBLEM — M47.816 SPONDYLOSIS OF LUMBAR REGION WITHOUT MYELOPATHY OR RADICULOPATHY: Status: RESOLVED | Noted: 2022-05-12 | Resolved: 2023-04-18

## 2023-04-18 PROBLEM — J96.02 ACUTE RESPIRATORY FAILURE WITH HYPOXIA AND HYPERCARBIA: Status: ACTIVE | Noted: 2023-04-18

## 2023-04-18 PROBLEM — M51.37 DEGENERATION OF LUMBAR OR LUMBOSACRAL INTERVERTEBRAL DISC: Status: RESOLVED | Noted: 2022-05-12 | Resolved: 2023-04-18

## 2023-04-18 PROBLEM — R55 VASOVAGAL SYNCOPE: Status: RESOLVED | Noted: 2022-10-06 | Resolved: 2023-04-18

## 2023-04-18 PROBLEM — K92.2 GIB (GASTROINTESTINAL BLEEDING): Status: RESOLVED | Noted: 2022-10-06 | Resolved: 2023-04-18

## 2023-04-18 PROBLEM — K22.10 ULCER OF ESOPHAGUS WITHOUT BLEEDING: Status: RESOLVED | Noted: 2022-11-08 | Resolved: 2023-04-18

## 2023-04-18 PROBLEM — M25.561 RIGHT KNEE PAIN: Status: RESOLVED | Noted: 2022-08-12 | Resolved: 2023-01-01

## 2023-04-18 PROBLEM — I50.23 ACUTE ON CHRONIC SYSTOLIC CHF (CONGESTIVE HEART FAILURE): Status: ACTIVE | Noted: 2021-06-04

## 2023-04-18 PROBLEM — M86.9 PYOGENIC INFLAMMATION OF BONE: Status: RESOLVED | Noted: 2021-04-08 | Resolved: 2023-04-18

## 2023-04-18 LAB
ACANTHOCYTES BLD QL SMEAR: NORMAL
ANION GAP SERPL CALCULATED.3IONS-SCNC: 19 MMOL/L (ref 5–15)
ATMOSPHERIC PRESS: ABNORMAL MM[HG]
BASE EXCESS BLDV CALC-SCNC: 8 MMOL/L (ref -2–2)
BASE EXCESS BLDV CALC-SCNC: 8.5 MMOL/L (ref -2–2)
BASE EXCESS BLDV CALC-SCNC: 9.6 MMOL/L (ref -2–2)
BASOPHILS # BLD AUTO: 0.02 10*3/MM3 (ref 0–0.2)
BASOPHILS NFR BLD AUTO: 0.3 % (ref 0–1.5)
BDY SITE: ABNORMAL
BODY TEMPERATURE: 37 C
BUN SERPL-MCNC: 76 MG/DL (ref 8–23)
BUN/CREAT SERPL: 54.3 (ref 7–25)
BURR CELLS BLD QL SMEAR: NORMAL
CALCIUM SPEC-SCNC: 8.6 MG/DL (ref 8.6–10.5)
CHLORIDE SERPL-SCNC: 103 MMOL/L (ref 98–107)
CO2 BLDA-SCNC: 37.2 MMOL/L (ref 22–33)
CO2 BLDA-SCNC: 37.8 MMOL/L (ref 22–33)
CO2 BLDA-SCNC: 38.7 MMOL/L (ref 22–33)
CO2 SERPL-SCNC: 26 MMOL/L (ref 22–29)
COHGB MFR BLD: 1.7 %
COHGB MFR BLD: 1.9 %
COHGB MFR BLD: 1.9 %
CREAT SERPL-MCNC: 1.4 MG/DL (ref 0.57–1)
DEPRECATED RDW RBC AUTO: 67.6 FL (ref 37–54)
EGFRCR SERPLBLD CKD-EPI 2021: 37.2 ML/MIN/1.73
ELLIPTOCYTES BLD QL SMEAR: NORMAL
EOSINOPHIL # BLD AUTO: 0.07 10*3/MM3 (ref 0–0.4)
EOSINOPHIL NFR BLD AUTO: 1.1 % (ref 0.3–6.2)
EPAP: 0
EPAP: 0
EPAP: 6
ERYTHROCYTE [DISTWIDTH] IN BLOOD BY AUTOMATED COUNT: 18.3 % (ref 12.3–15.4)
GLUCOSE BLDC GLUCOMTR-MCNC: 161 MG/DL (ref 70–130)
GLUCOSE BLDC GLUCOMTR-MCNC: 178 MG/DL (ref 70–130)
GLUCOSE BLDC GLUCOMTR-MCNC: 246 MG/DL (ref 70–130)
GLUCOSE SERPL-MCNC: 160 MG/DL (ref 65–99)
HCO3 BLDV-SCNC: 35.3 MMOL/L (ref 22–28)
HCO3 BLDV-SCNC: 36 MMOL/L (ref 22–28)
HCO3 BLDV-SCNC: 36.4 MMOL/L (ref 22–28)
HCT VFR BLD AUTO: 36.3 % (ref 34–46.6)
HGB BLD-MCNC: 10.9 G/DL (ref 12–15.9)
HGB BLDA-MCNC: 10.3 G/DL (ref 14–18)
HGB BLDA-MCNC: 10.7 G/DL (ref 14–18)
HGB BLDA-MCNC: 10.8 G/DL (ref 14–18)
IMM GRANULOCYTES # BLD AUTO: 0.67 10*3/MM3 (ref 0–0.05)
IMM GRANULOCYTES NFR BLD AUTO: 10.5 % (ref 0–0.5)
INHALED O2 CONCENTRATION: 21 %
INHALED O2 CONCENTRATION: 30 %
INHALED O2 CONCENTRATION: 30 %
IPAP: 0
IPAP: 0
IPAP: 20
LYMPHOCYTES # BLD AUTO: 0.86 10*3/MM3 (ref 0.7–3.1)
LYMPHOCYTES NFR BLD AUTO: 13.5 % (ref 19.6–45.3)
Lab: ABNORMAL
Lab: ABNORMAL
MCH RBC QN AUTO: 29.9 PG (ref 26.6–33)
MCHC RBC AUTO-ENTMCNC: 30 G/DL (ref 31.5–35.7)
MCV RBC AUTO: 99.7 FL (ref 79–97)
METHGB BLD QL: 0.1 %
METHGB BLD QL: 0.4 %
METHGB BLD QL: 0.4 %
MODALITY: ABNORMAL
MONOCYTES # BLD AUTO: 0.59 10*3/MM3 (ref 0.1–0.9)
MONOCYTES NFR BLD AUTO: 9.3 % (ref 5–12)
NEUTROPHILS NFR BLD AUTO: 4.16 10*3/MM3 (ref 1.7–7)
NEUTROPHILS NFR BLD AUTO: 65.3 % (ref 42.7–76)
NOTE: ABNORMAL
NOTIFIED BY: ABNORMAL
NOTIFIED BY: ABNORMAL
NOTIFIED WHO: ABNORMAL
NOTIFIED WHO: ABNORMAL
NRBC BLD AUTO-RTO: 0.3 /100 WBC (ref 0–0.2)
OVALOCYTES BLD QL SMEAR: NORMAL
OXYHGB MFR BLDV: 45.2 %
OXYHGB MFR BLDV: 71.4 %
OXYHGB MFR BLDV: 79.3 %
PAW @ PEAK INSP FLOW SETTING VENT: 0 CMH2O
PAW @ PEAK INSP FLOW SETTING VENT: 0 CMH2O
PAW @ PEAK INSP FLOW SETTING VENT: 20 CMH2O
PCO2 BLDV: 57.6 MM HG (ref 41–51)
PCO2 BLDV: 60.2 MM HG (ref 41–51)
PCO2 BLDV: 73.9 MM HG (ref 41–51)
PEEP RESPIRATORY: 6 CM[H2O]
PH BLDV: 7.3 PH UNITS (ref 7.31–7.41)
PH BLDV: 7.38 PH UNITS (ref 7.31–7.41)
PH BLDV: 7.41 PH UNITS (ref 7.31–7.41)
PLAT MORPH BLD: NORMAL
PLATELET # BLD AUTO: 199 10*3/MM3 (ref 140–450)
PMV BLD AUTO: 10.6 FL (ref 6–12)
PO2 BLDV: 30.4 MM HG (ref 27–53)
PO2 BLDV: 41.2 MM HG (ref 27–53)
PO2 BLDV: 47.1 MM HG (ref 27–53)
POTASSIUM SERPL-SCNC: 4.9 MMOL/L (ref 3.5–5.2)
RBC # BLD AUTO: 3.64 10*6/MM3 (ref 3.77–5.28)
SODIUM SERPL-SCNC: 148 MMOL/L (ref 136–145)
TOTAL RATE: 0 BREATHS/MINUTE
TOTAL RATE: 22 BREATHS/MINUTE
TOTAL RATE: 22 BREATHS/MINUTE
VENTILATOR MODE: ABNORMAL
VENTILATOR MODE: ABNORMAL
WBC MORPH BLD: NORMAL
WBC NRBC COR # BLD: 6.37 10*3/MM3 (ref 3.4–10.8)

## 2023-04-18 PROCEDURE — 99222 1ST HOSP IP/OBS MODERATE 55: CPT | Performed by: STUDENT IN AN ORGANIZED HEALTH CARE EDUCATION/TRAINING PROGRAM

## 2023-04-18 PROCEDURE — 82805 BLOOD GASES W/O2 SATURATION: CPT

## 2023-04-18 PROCEDURE — 94799 UNLISTED PULMONARY SVC/PX: CPT

## 2023-04-18 PROCEDURE — 99233 SBSQ HOSP IP/OBS HIGH 50: CPT | Performed by: HOSPITALIST

## 2023-04-18 PROCEDURE — 94660 CPAP INITIATION&MGMT: CPT

## 2023-04-18 PROCEDURE — 63710000001 INSULIN LISPRO (HUMAN) PER 5 UNITS: Performed by: INTERNAL MEDICINE

## 2023-04-18 PROCEDURE — 82962 GLUCOSE BLOOD TEST: CPT

## 2023-04-18 PROCEDURE — 85007 BL SMEAR W/DIFF WBC COUNT: CPT | Performed by: INTERNAL MEDICINE

## 2023-04-18 PROCEDURE — 80048 BASIC METABOLIC PNL TOTAL CA: CPT | Performed by: INTERNAL MEDICINE

## 2023-04-18 PROCEDURE — 85025 COMPLETE CBC W/AUTO DIFF WBC: CPT | Performed by: INTERNAL MEDICINE

## 2023-04-18 PROCEDURE — 99222 1ST HOSP IP/OBS MODERATE 55: CPT | Performed by: INTERNAL MEDICINE

## 2023-04-18 RX ORDER — FAMOTIDINE 20 MG/1
20 TABLET, FILM COATED ORAL
Status: DISCONTINUED | OUTPATIENT
Start: 2023-04-18 | End: 2023-04-19

## 2023-04-18 RX ORDER — POLYETHYLENE GLYCOL 3350 17 G/17G
17 POWDER, FOR SOLUTION ORAL DAILY PRN
Status: DISCONTINUED | OUTPATIENT
Start: 2023-04-18 | End: 2023-04-28 | Stop reason: HOSPADM

## 2023-04-18 RX ORDER — BISACODYL 10 MG
10 SUPPOSITORY, RECTAL RECTAL DAILY PRN
Status: DISCONTINUED | OUTPATIENT
Start: 2023-04-18 | End: 2023-04-28 | Stop reason: HOSPADM

## 2023-04-18 RX ORDER — BISACODYL 5 MG/1
5 TABLET, DELAYED RELEASE ORAL DAILY PRN
Status: DISCONTINUED | OUTPATIENT
Start: 2023-04-18 | End: 2023-04-28 | Stop reason: HOSPADM

## 2023-04-18 RX ORDER — METOPROLOL SUCCINATE 25 MG/1
25 TABLET, EXTENDED RELEASE ORAL
Status: DISCONTINUED | OUTPATIENT
Start: 2023-04-18 | End: 2023-04-19

## 2023-04-18 RX ORDER — AMOXICILLIN 250 MG
2 CAPSULE ORAL 2 TIMES DAILY
Status: DISCONTINUED | OUTPATIENT
Start: 2023-04-18 | End: 2023-04-28 | Stop reason: HOSPADM

## 2023-04-18 RX ADMIN — APIXABAN 2.5 MG: 2.5 TABLET, FILM COATED ORAL at 09:32

## 2023-04-18 RX ADMIN — LOSARTAN POTASSIUM 50 MG: 50 TABLET, FILM COATED ORAL at 09:32

## 2023-04-18 RX ADMIN — APIXABAN 2.5 MG: 2.5 TABLET, FILM COATED ORAL at 20:52

## 2023-04-18 RX ADMIN — BUMETANIDE 1 MG: 0.25 INJECTION, SOLUTION INTRAMUSCULAR; INTRAVENOUS at 17:03

## 2023-04-18 RX ADMIN — INSULIN LISPRO 2 UNITS: 100 INJECTION, SOLUTION INTRAVENOUS; SUBCUTANEOUS at 17:03

## 2023-04-18 RX ADMIN — ATORVASTATIN CALCIUM 40 MG: 40 TABLET, FILM COATED ORAL at 20:52

## 2023-04-18 RX ADMIN — SENNOSIDES AND DOCUSATE SODIUM 2 TABLET: 8.6; 5 TABLET ORAL at 09:37

## 2023-04-18 RX ADMIN — Medication 10 ML: at 20:53

## 2023-04-18 RX ADMIN — CLOPIDOGREL BISULFATE 75 MG: 75 TABLET ORAL at 09:32

## 2023-04-18 RX ADMIN — BUMETANIDE 1 MG: 0.25 INJECTION, SOLUTION INTRAMUSCULAR; INTRAVENOUS at 09:31

## 2023-04-18 RX ADMIN — TRAMADOL HYDROCHLORIDE 25 MG: 50 TABLET, COATED ORAL at 09:37

## 2023-04-18 RX ADMIN — INSULIN LISPRO 2 UNITS: 100 INJECTION, SOLUTION INTRAVENOUS; SUBCUTANEOUS at 11:32

## 2023-04-18 RX ADMIN — POLYETHYLENE GLYCOL 3350 17 G: 17 POWDER, FOR SOLUTION ORAL at 09:32

## 2023-04-18 RX ADMIN — METOPROLOL SUCCINATE 25 MG: 25 TABLET, EXTENDED RELEASE ORAL at 13:55

## 2023-04-18 RX ADMIN — FAMOTIDINE 20 MG: 20 TABLET, FILM COATED ORAL at 16:59

## 2023-04-18 RX ADMIN — SENNOSIDES AND DOCUSATE SODIUM 2 TABLET: 8.6; 5 TABLET ORAL at 20:52

## 2023-04-18 RX ADMIN — SERTRALINE HYDROCHLORIDE 50 MG: 50 TABLET ORAL at 09:32

## 2023-04-18 RX ADMIN — FAMOTIDINE 20 MG: 20 TABLET, FILM COATED ORAL at 09:32

## 2023-04-18 NOTE — PLAN OF CARE
Goal Outcome Evaluation:  Plan of Care Reviewed With: patient        Progress: no change  Outcome Evaluation: remained on bipap through the night, with labs improving this morning. Has rested well through the night with one episode of confusion to place easily reoriented.

## 2023-04-18 NOTE — CASE MANAGEMENT/SOCIAL WORK
Discharge Planning Assessment  [facility]     Patient Name: Susan Anderson  MRN: [id number]  Today's Date: 4/18/2023    Admit Date: 4/17/2023        Discharge Needs Assessment     Row Name 04/18/23 1602       Living Environment    Current Living Arrangements home    Quality of Family Relationships helpful       Transition Planning    Patient/Family Anticipates Transition to home with family    Patient/Family Anticipated Services at Transition        Discharge Needs Assessment    Equipment Currently Used at Home wheelchair;walker, rolling    Concerns to be Addressed discharge planning    Equipment Needed After Discharge bath bench;wheelchair, manual;hospital bed               Discharge Plan     Row Name 04/18/23 1606       Plan    Plan Comments Case management met with the patient and her daughter and she said that the patient has caregivers at her home and she has a needed durable medical equipment and home oxygen. Her goal is to be discharged to her home and they have been getting outpatient wound care. Case management will continue to follow for discharge planning needs.    Row Name 04/18/23 9272       Plan    Plan Comments Case management is waiting for PT and OT evaluations to be completed for reccomendations for possible rehab.              Continued Care and Services - Admitted Since 4/17/2023    Coordination has not been started for this encounter.     Selected Continued Care - Prior Encounters Includes continued care and service providers with selected services from prior encounters from 1/17/2023 to 4/18/2023    Discharged on 2/2/2023 Admission date: 1/28/2023 - Discharge disposition: Home-Health Care Svc    Durable Medical Equipment     Service Provider Selected Services Address Phone Fax Patient Preferred    [facility] [place] [facility] [address] [facility] [facility] [address] [phone] --          Home Medical Care     Service Provider Selected Services Address  Phone Fax Patient Preferred    [facility] [address] [id number] [phone] --                Discharged on 1/20/2023 Admission date: 1/3/2023 - Discharge disposition: Home-Health Care Svc    Dialysis/Infusion     Service Provider Selected Services Address Phone Fax Patient Preferred    [facility] [address]  , [place] [id number] [phone] --          Home Medical Care     Service Provider Selected Services Address Phone Fax Patient Preferred    [facility] [address] [id number] [phone] --       Internal Comment last updated by Johanny Epps, RN 1/20/2023 1409    CRIS                                Expected Discharge Date and Time     Expected Discharge Date Expected Discharge Time    Apr 21, 2023          Demographic Summary     Row Name 04/18/23 1601       General Information    Admission Type inpatient    Arrived From home    Referral Source patient    Reason for Consult discharge planning    Preferred Language English       Contact Information    Permission Granted to Share Info With     Contact Information Obtained for                Functional Status     Row Name 04/18/23 1602       Functional Status    Usual Activity Tolerance fair    Current Activity Tolerance fair               Psychosocial    No documentation.                Abuse/Neglect    No documentation.                Legal    No documentation.                Substance Abuse    No documentation.                Patient Forms    No documentation.                   AMELIA Andres

## 2023-04-18 NOTE — PLAN OF CARE
Problem: Adult Inpatient Plan of Care  Goal: Plan of Care Review  Outcome: Ongoing, Progressing  Goal: Patient-Specific Goal (Individualized)  Outcome: Ongoing, Progressing  Goal: Absence of Hospital-Acquired Illness or Injury  Outcome: Ongoing, Progressing  Intervention: Identify and Manage Fall Risk  Recent Flowsheet Documentation  Taken 4/18/2023 1400 by Cha Jefferson RN  Safety Promotion/Fall Prevention: safety round/check completed  Taken 4/18/2023 1200 by Cha Jefferson RN  Safety Promotion/Fall Prevention: safety round/check completed  Taken 4/18/2023 1000 by Cha Jefferson RN  Safety Promotion/Fall Prevention: safety round/check completed  Taken 4/18/2023 0800 by Cha Jefferson RN  Safety Promotion/Fall Prevention: safety round/check completed  Intervention: Prevent Skin Injury  Recent Flowsheet Documentation  Taken 4/18/2023 1400 by Cha Jefferson RN  Body Position: weight shifting  Skin Protection: incontinence pads utilized  Taken 4/18/2023 1200 by Cha Jefferson RN  Body Position: weight shifting  Skin Protection: incontinence pads utilized  Taken 4/18/2023 1000 by Cha Jefferson RN  Body Position: weight shifting  Skin Protection: incontinence pads utilized  Taken 4/18/2023 0800 by Cha Jefferson RN  Body Position: weight shifting  Skin Protection: incontinence pads utilized  Intervention: Prevent and Manage VTE (Venous Thromboembolism) Risk  Recent Flowsheet Documentation  Taken 4/18/2023 1400 by Cha Jefferson RN  Activity Management: activity encouraged  Taken 4/18/2023 1200 by Cha Jefferson RN  Activity Management: activity encouraged  Taken 4/18/2023 1000 by Cha Jefferson RN  Activity Management: activity encouraged  Taken 4/18/2023 0800 by Cha Jefferson RN  Activity Management: activity encouraged  VTE Prevention/Management: bilateral  Range of Motion: active ROM (range of motion) encouraged  Intervention: Prevent Infection  Recent Flowsheet Documentation  Taken 4/18/2023 1400 by  Cha Jefferson RN  Infection Prevention: hand hygiene promoted  Taken 4/18/2023 1200 by Cha Jefferson RN  Infection Prevention: hand hygiene promoted  Taken 4/18/2023 1000 by Cha Jefferson RN  Infection Prevention: hand hygiene promoted  Taken 4/18/2023 0800 by Cha Jefferson RN  Infection Prevention: hand hygiene promoted  Goal: Optimal Comfort and Wellbeing  Outcome: Ongoing, Progressing  Goal: Readiness for Transition of Care  Outcome: Ongoing, Progressing     Problem: Fall Injury Risk  Goal: Absence of Fall and Fall-Related Injury  Outcome: Ongoing, Progressing  Intervention: Identify and Manage Contributors  Recent Flowsheet Documentation  Taken 4/18/2023 1200 by Cha Jefferson RN  Medication Review/Management: medications reviewed  Taken 4/18/2023 1000 by Cha Jefferson RN  Medication Review/Management: medications reviewed  Taken 4/18/2023 0800 by Cha Jefferson RN  Medication Review/Management: medications reviewed  Intervention: Promote Injury-Free Environment  Recent Flowsheet Documentation  Taken 4/18/2023 1400 by Cha Jefferson RN  Safety Promotion/Fall Prevention: safety round/check completed  Taken 4/18/2023 1200 by Cha Jefferson RN  Safety Promotion/Fall Prevention: safety round/check completed  Taken 4/18/2023 1000 by Cha Jefferson RN  Safety Promotion/Fall Prevention: safety round/check completed  Taken 4/18/2023 0800 by Cha Jefferson RN  Safety Promotion/Fall Prevention: safety round/check completed     Problem: Diabetes Comorbidity  Goal: Blood Glucose Level Within Targeted Range  Outcome: Ongoing, Progressing     Problem: Heart Failure Comorbidity  Goal: Maintenance of Heart Failure Symptom Control  Outcome: Ongoing, Progressing  Intervention: Maintain Heart Failure-Management  Recent Flowsheet Documentation  Taken 4/18/2023 1200 by Cha Jefferson RN  Medication Review/Management: medications reviewed  Taken 4/18/2023 1000 by Cha Jefferson RN  Medication Review/Management:  medications reviewed  Taken 4/18/2023 0800 by Cha Jefferson RN  Medication Review/Management: medications reviewed     Problem: Hypertension Comorbidity  Goal: Blood Pressure in Desired Range  Outcome: Ongoing, Progressing  Intervention: Maintain Blood Pressure Management  Recent Flowsheet Documentation  Taken 4/18/2023 1200 by Cha Jefferson RN  Medication Review/Management: medications reviewed  Taken 4/18/2023 1000 by Cha Jefferson RN  Medication Review/Management: medications reviewed  Taken 4/18/2023 0800 by Cha Jefferson RN  Medication Review/Management: medications reviewed     Problem: Osteoarthritis Comorbidity  Goal: Maintenance of Osteoarthritis Symptom Control  Outcome: Ongoing, Progressing  Intervention: Maintain Osteoarthritis Symptom Control  Recent Flowsheet Documentation  Taken 4/18/2023 1400 by Cha Jefferson RN  Activity Management: activity encouraged  Taken 4/18/2023 1200 by Cha Jefferson RN  Activity Management: activity encouraged  Medication Review/Management: medications reviewed  Taken 4/18/2023 1000 by Cha Jefferson RN  Activity Management: activity encouraged  Medication Review/Management: medications reviewed  Taken 4/18/2023 0800 by Cha Jefferson RN  Activity Management: activity encouraged  Medication Review/Management: medications reviewed     Problem: Pain Chronic (Persistent) (Comorbidity Management)  Goal: Acceptable Pain Control and Functional Ability  Outcome: Ongoing, Progressing  Intervention: Manage Persistent Pain  Recent Flowsheet Documentation  Taken 4/18/2023 1200 by Cha Jefferson RN  Medication Review/Management: medications reviewed  Taken 4/18/2023 1000 by Cha Jefferson RN  Medication Review/Management: medications reviewed  Taken 4/18/2023 0800 by Cha Jefferson RN  Medication Review/Management: medications reviewed     Problem: Skin Injury Risk Increased  Goal: Skin Health and Integrity  Outcome: Ongoing, Progressing  Intervention: Optimize Skin  Protection  Recent Flowsheet Documentation  Taken 4/18/2023 1400 by Cha Jefferson RN  Pressure Reduction Techniques:   frequent weight shift encouraged   heels elevated off bed   positioned off wounds   pressure points protected   weight shift assistance provided  Head of Bed ([facility]) Positioning: [facility] elevated  Pressure Reduction Devices:   foam padding utilized   heel offloading device utilized   positioning supports utilized  Skin Protection: incontinence pads utilized  Taken 4/18/2023 1200 by Cha Jefferson RN  Pressure Reduction Techniques:   frequent weight shift encouraged   positioned off wounds   heels elevated off bed   pressure points protected   weight shift assistance provided  Head of Bed ([facility]) Positioning: [facility] elevated  Pressure Reduction Devices:   foam padding utilized   heel offloading device utilized   positioning supports utilized  Skin Protection: incontinence pads utilized  Taken 4/18/2023 1000 by Cha Jefferson RN  Pressure Reduction Techniques:   frequent weight shift encouraged   heels elevated off bed   positioned off wounds   pressure points protected   weight shift assistance provided  Head of Bed ([facility]) Positioning: [facility] elevated  Pressure Reduction Devices:   foam padding utilized   heel offloading device utilized   positioning supports utilized  Skin Protection: incontinence pads utilized  Taken 4/18/2023 0800 by Cha Jefferson RN  Pressure Reduction Techniques:   frequent weight shift encouraged   heels elevated off bed   positioned off wounds   pressure points protected   weight shift assistance provided  Head of Bed ([facility]) Positioning: [facility] elevated  Pressure Reduction Devices:   foam padding utilized   heel offloading device utilized   positioning supports utilized  Skin Protection: incontinence pads utilized   Goal Outcome Evaluation:

## 2023-04-18 NOTE — CONSULTS
INFECTIOUS DISEASE CONSULT/INITIAL HOSPITAL VISIT    Susan Anderson  1940  [id number]    Date of Consult: 4/18/2023    Admission Date: 4/17/2023      Requesting Provider: No ref. provider found  Evaluating Physician: Stef Pete MD    Reason for Consultation: Bilateral foot infections    History of present illness:    Patient is a 83 y.o. female With type 2 diabetes mellitus, stage III chronic kidney disease, coronary artery disease, systolic/diastolic congestive heart failure, Severe peripheral vascular disease, recurrent bilateral foot infections, recent bilateral heel decubitus ulcers, a recent MRSA right toe wound infection, and recent Covid 19 in late January 2023 who is seen today after she presented with dyspnea and fatigue, and malaise. I saw her recently in the office with bilateral heel decubitus ulcers and a right fourth toe ulcer with associated cellulitis.  I initially started her on Augmentin therapy but then switched to minocycline after a right fourth toe culture returned positive for MRSA. I followed her during an admission from 1/3 until 1/20/23 for bibasilar pneumonia, Klebsiella/E. Coli UTI, and PEA arrest with acute hypoxic respiratory failure.  She suffered from acute renal failure requiring dialysis but her dialysis has subsequently been discontinued.  She was readmitted from 1/28 to 2/2/23 for Covid 19 infection for which she received Decadron but was not able to receive remdesivir due to bradycardia.  She was readmitted last night with dyspnea and acute hypoxic respiratory failure.  She has required BiPAP and is currently on 30% FiO2 with an O2 saturation of 98%.  She is lethargic and unable to provide any history. Chest CT scan revealed moderate to large bilateral pleural effusions and bibasilar atelectasis with cardiomegaly.  She is undergoing diuresis.She has been started on intravenous daptomycin.  She has remained afebrile.Her pro-BNP yesterday was 19,401. Her creatinine  was 1.63.Her white blood cell count was 8.1.Her creatinine today is 1.4 and her white blood cell count is 6.4.     Past Medical History:   Diagnosis Date   • Anemia 8/22   • Anxiety    • Arthritis    • Asthma 6/4 - double pneumonia    Currently on inhaler and nebulizer   • Atrial fibrillation 08/21/2022   • Brain tumor     Benign and followed at    • Cancer     cervical cancer, skin cancer   • CHF (congestive heart failure) 06/04/2021   • Chronic kidney disease Related to diabetes   • Clotting disorder 10/22    Upper GI bleed from blood thinners aggravate ulcers   • Coronary artery disease 6/4/2021 DX for hear failure   • COVID-19 01/28/2023   • Depression 8/22   • Diabetes mellitus 30 years    Seeing Dr. Lam 1st time Aug 19   • Dizziness 07/27/2022   • Effusion of right knee 08/12/2022   • Fall 07/27/2022   • GERD (gastroesophageal reflux disease)    • Gout    • History of medical problems 8/22    AFIB   • History of staph infection 10/2021    right toe   • History of transfusion     no reactions, 1 unit, [facility]   • HL (hearing loss)     Acoustic neuroma right   • Hx of colonoscopy    • Hyperlipidemia Reference current labs x 2-3yrs approx   • Hypertension 30 years   • Hypomagnesemia 10/06/2022   • Insomnia    • Low back pain    • Migraine    • Mitral valve disease    • Mitral valve disease    • Osteomyelitis    • Peptic ulceration 10/22    Secondary to blood thinners   • Peripheral neuropathy    • Physical deconditioning 05/17/2022   • Pneumonia due to infectious organism 06/04/2021   • Pressure injury of skin of sacral region 08/21/2022   • Renal insufficiency 2021   • Sepsis 01/28/2023   • Sleep apnea    • Syncope, unspecified syncope type 10/06/2022   • Type 2 diabetes mellitus     30 years   • Weakness 07/27/2022       Past Surgical History:   Procedure Laterality Date   • ABDOMINAL HYSTERECTOMY W/SALPINGECTOMY     • AMPUTATION  Right great toe, 1st 1/3 metatarsal -Ponca 4/14/21   • AORTAGRAM N/A 04/09/2021     Procedure: AORTAGRAM WITH OR WITHOUT RUNOFFS, WITH Co2;  Surgeon: Trey Forrest MD;  Location:  [facility];  Service: Vascular;  Laterality: N/A;   • AORTAGRAM N/A 09/28/2022    Procedure: CO2 ANGIOGRAM, LEFT SFA ANGIOPLASTY WITH DRUG ELUTING BALLOON, LEFT SFA STENT PLACEMENT ;  Surgeon: Trey Forrest MD;  Location:  [facility];  Service: Vascular;  Laterality: N/A;  FLUORO: 13.12  DOSE 162mGy  CONTRAST: Isovue 350: 15ml   • APPENDECTOMY     • BRAIN TUMOR EXCISION      laser surgery    • CARDIAC CATHETERIZATION N/A 06/21/2021    Procedure: LEFT HEART CATH;  Surgeon: Anjum Ceballos MD;  Location:  [facility];  Service: Cardiology;  Laterality: N/A;   • CATARACT EXTRACTION W/ INTRAOCULAR LENS  IMPLANT, BILATERAL     • CHOLECYSTECTOMY     • COLONOSCOPY     • ENDOSCOPY N/A 10/07/2022    Procedure: ESOPHAGOGASTRODUODENOSCOPY;  Surgeon: Stef Veras MD;  Location: [facility];  Service: Gastroenterology;  Laterality: N/A;   • EYE SURGERY     • FEMORAL ARTERY STENT  10/2022   • HYSTERECTOMY     • INTERVENTIONAL RADIOLOGY PROCEDURE N/A 1/15/2023    Procedure: CV INTERVENTIONAL RADIOLOGY PROCEDURE;  Surgeon: Sanket Zapata MD;  Location:  [facility];  Service: Interventional Radiology;  Laterality: N/A;   • TOE SURGERY     • TRANS METATARSAL AMPUTATION Right 04/14/2021    Procedure: AMPUTATION TRANS METATARSAL RIGHT GREAT TOE;  Surgeon: Valdez Finney MD;  Location: [facility] OR;  Service: Vascular;  Laterality: Right;       Family History   Problem Relation Age of Onset   • Diabetes Mother         Type II   • Heart disease Father    • Heart attack Father    • Coronary artery disease Father    • Diabetes Father         Type II   • Diabetes Sister    • Diabetes Maternal Grandmother    • Diabetes Maternal Grandfather    • Diabetes Paternal Grandmother    • Diabetes Paternal Grandfather        Social History     Socioeconomic History   • Marital status:    •  Number of children: 1   Tobacco Use   • Smoking status: Never   • Smokeless tobacco: Never   Vaping Use   • Vaping Use: Never used   Substance and Sexual Activity   • Alcohol use: Not Currently     Comment: Social drinking when not recovering from hospitalization   • Drug use: Never   • Sexual activity: Not Currently     Birth control/protection: None       Allergies   Allergen Reactions   • Vancomycin Other (See Comments)     Acute Kidney Injury, requested by ID   • Baclofen Other (See Comments) and Hallucinations     PSYCHOSIS-POA REFUSES ADMINISTRATION OF THIS MED.   • Cephalexin Nausea Only   • Erythromycin Base Nausea Only   • Melatonin Other (See Comments)     Nightmares   • Oxycodone Nausea Only   • Protonix [Pantoprazole] Itching and Rash   • Sulfa Antibiotics Nausea Only         Medication:    Current Facility-Administered Medications:   •  apixaban (ELIQUIS) tablet 2.5 mg, 2.5 mg, Oral, Q12H, Faiza Angulo MD, 2.5 mg at 04/17/23 2154  •  atorvastatin (LIPITOR) tablet 40 mg, 40 mg, Oral, Nightly, Faiza Angulo MD, 40 mg at 04/17/23 2154  •  sennosides-docusate (PERICOLACE) 8.6-50 MG per tablet 2 tablet, 2 tablet, Oral, BID **AND** polyethylene glycol (MIRALAX) packet 17 g, 17 g, Oral, Daily PRN **AND** bisacodyl (DULCOLAX) EC tablet 5 mg, 5 mg, Oral, Daily PRN **AND** bisacodyl (DULCOLAX) suppository 10 mg, 10 mg, Rectal, Daily PRN, Faiza Angulo MD  •  bumetanide (BUMEX) injection 1 mg, 1 mg, Intravenous, BID, Faiza Angulo MD  •  clonazePAM (KlonoPIN) tablet 0.5 mg, 0.5 mg, Oral, BID PRN, Faiza Angulo MD, 0.5 mg at 04/17/23 2154  •  clopidogrel (PLAVIX) tablet 75 mg, 75 mg, Oral, Daily, Faiza Angulo MD  •  [START ON 4/19/2023] DAPTOmycin (CUBICIN) 300 mg in sodium chloride 0.9 % 50 mL IVPB, 4 mg/kg (Adjusted), Intravenous, Q48H, Faiza Angulo MD  •  Insulin Lispro (humaLOG) injection 0-7 Units, 0-7 Units, Subcutaneous, TID With Meals, Faiza Angulo MD  •  losartan  (COZAAR) tablet 50 mg, 50 mg, Oral, Q24H, Faiza Angulo MD  •  nitroglycerin (NITROSTAT) ointment 0.5 inch, 0.5 inch, Topical, Q6H PRN, Faiza Angulo MD  •  Pharmacy Consult - Pharmacy to dose, , Does not apply, Continuous PRN, Faiza Angulo MD  •  polyethylene glycol (MIRALAX) packet 17 g, 17 g, Oral, Daily, Faiza Angulo MD  •  sertraline (ZOLOFT) tablet 50 mg, 50 mg, Oral, Daily, Faiza Angulo MD  •  sodium chloride 0.9 % flush 10 mL, 10 mL, Intravenous, PRN, Faiza Angulo MD, 10 mL at 23  •  traMADol (ULTRAM) tablet 25 mg, 25 mg, Oral, Q12H PRN, Faiza Angulo MD, 25 mg at 23    Antibiotics:  Anti-Infectives (From admission, onward)    Ordered     Dose/Rate Route Frequency Start Stop    23  DAPTOmycin (CUBICIN) 300 mg in sodium chloride 0.9 % 50 mL IVPB        Ordering Provider: Faiza Angulo MD    4 mg/kg × 69.2 kg (Adjusted)  100 mL/hr over 30 Minutes Intravenous Every 48 Hours 23 1800 23 1759    23 1542  DAPTOmycin (CUBICIN) 300 mg in sodium chloride 0.9 % 50 mL IVPB        Note to Pharmacy: MRSA w/ restriction from Vanco per ID.   Ordering Provider: Kings Herrera MD    300 mg  100 mL/hr over 30 Minutes Intravenous Once 23 1600 23 1801            Review of Systems:  Unable to provide any ROS due to severe debility.    Physical Exam:   Vital Signs  Temp (24hrs), Av °F (36.7 °C), Min:98 °F (36.7 °C), Max:98 °F (36.7 °C)    Temp  Min: 98 °F (36.7 °C)  Max: 98 °F (36.7 °C)  BP  Min: 125/89  Max: 176/124  Pulse  Min: 55  Max: 68  Resp  Min: 16  Max: 26  SpO2  Min: 92 %  Max: 100 %    GENERAL: Severe debility with minimal responsiveness.  HEENT: No labial ulcers    NECK: Supple   LYMPH: No cervical, axillary or inguinal lymphadenopathy.  HEART: RRR; No murmur, rubs, gallops.   LUNGS: Decreased breath sounds at both bases.  ABDOMEN: Soft, nontender, nondistended.  No rebound or guarding. NO mass or HSM.  EXT:   1-2+ bilateral lower extremity edema.  She has bilateral 1.5 cm heel pressure ulcers with some slough but no erythema.  These are non-stageable but may be stage III.  She has a right lateral fourth toe ulcer which is 0.5 cm in diameter with some moderate surrounding erythema and central slough.  This appears to be 3-4 mm deep.  MSK: No joint effusions or erythema  SKIN: No diffuse rash present.    NEURO: She is lethargic and not arousable.  She does not respond to questions.  She does move all of her extremities.    Laboratory Data    Results from last 7 days   Lab Units 04/18/23  0349 04/17/23  1517   WBC 10*3/mm3 6.37 8.08   HEMOGLOBIN g/dL 10.9* 11.7*   HEMATOCRIT % 36.3 39.3   PLATELETS 10*3/mm3 199 236     Results from last 7 days   Lab Units 04/18/23  0543   SODIUM mmol/L 148*   POTASSIUM mmol/L 4.9   CHLORIDE mmol/L 103   CO2 mmol/L 26.0   BUN mg/dL 76*   CREATININE mg/dL 1.40*   GLUCOSE mg/dL 160*   CALCIUM mg/dL 8.6     Results from last 7 days   Lab Units 04/17/23  1517   ALK PHOS U/L 208*   BILIRUBIN mg/dL 0.6   ALT (SGPT) U/L 22   AST (SGOT) U/L 40*         Results from last 7 days   Lab Units 04/17/23  1517   CRP mg/dL 8.02*     Results from last 7 days   Lab Units 04/17/23  1517   LACTATE mmol/L 1.1     Results from last 7 days   Lab Units 04/17/23  1517   CK TOTAL U/L 106         Estimated Creatinine Clearance: 34.2 mL/min (A) (by C-G formula based on SCr of 1.4 mg/dL (H)).      Microbiology:  No results found for: ACANTHNAEG, AFBCX, BPERTUSSISCX, BLOODCX  No results found for: BCIDPCR, CXREFLEX, CSFCX, CULTURETIS  No results found for: CULTURES, HSVCX, URCX  No results found for: EYECULTURE, GCCX, HSVCULTURE, LABHSV  No results found for: LEGIONELLA, MRSACX, MUMPSCX, MYCOPLASCX  No results found for: NOCARDIACX, STOOLCX  No results found for: THROATCX, UNSTIMCULT, URINECX, CULTURE, VZVCULTUR  Wound Culture   Date Value Ref Range Status   04/12/2023 Moderate growth (3+) Staphylococcus aureus, MRSA (A)   Final     Comment:       Methicillin resistant Staphylococcus aureus, Patient may be an isolation risk.           Radiology:  Imaging Results (Last 72 Hours)     Procedure Component Value Units Date/Time    CT Chest Without Contrast Diagnostic [[id number]] Collected: 04/17/23 2005     Updated: 04/17/23 2013    Narrative:      CT CHEST WO CONTRAST DIAGNOSTIC    Date of Exam: 4/17/2023 7:49 PM EDT    Indication: Pneumonia, complication suspected, xray done.    Comparison: 4/17/2023, 1/20/2023    Technique: Axial CT images were obtained of the chest without contrast administration.  Reconstructed coronal and sagittal images were also obtained. Automated exposure control and iterative construction methods were used.    Findings:  Hilum and Mediastinum: No pathologically enlarged lymph nodes. The heart appears enlarged. No pericardial effusion.  Unremarkable thoracic aorta and pulmonary arteries.    Lung Parenchyma and Pleura: Large bilateral pleural effusions are present. Intralobular septal thickening is present. Atelectatic changes are present within the bilateral lower lobes. No definite consolidation identified though evaluation is limited due   to significant atelectasis. Atherosclerotic calcifications are present including within the pulmonary arteries. Granulomatous calcifications are present.    Upper Abdomen: No acute process.     Soft tissues: Unremarkable.    Osseous structures: No aggressive focal lytic or sclerotic osseous lesions.      Impression:      Impression:    1. At least moderate pulmonary edema pattern with large bilateral pleural effusions and bibasilar atelectasis. There is cardiomegaly. No definite consolidation identified though significant atelectatic changes are present. Superimposed pneumonia cannot   be excluded.  2. Ancillary findings as described above.    Electronically Signed: Karen Rey    4/17/2023 8:10 PM EDT    Workstation ID: [id number]    XR Chest 1 View [[id number]] Collected:  23 1609     Updated: 23 1614    Narrative:      XR CHEST 1 VW    Date of Exam: 2023 3:46 PM EDT    Indication: Weak/Dizzy/AMS triage protocol.    Comparison: 2023    FINDINGS:  There is a suspected moderate right and a small left pleural effusion. There are basilar predominant opacities with prominent pulmonary vasculature. The heart appears enlarged, as before. Right IJ catheter is no longer seen.  No pneumothorax is   identified.      Impression:      1.Moderate right and small left pleural effusions.  2.Basilar predominant opacities with cardiomegaly and prominent pulmonary vasculature, suspicious for edema and atelectasis.       Electronically Signed: Billy Leonie    2023 4:11 PM EDT    Workstation ID: [id number]      I read her radiographic studies.      Impression:   1.  MRSA right foot wound infection-with cellulitis of the right fourth toe and bilateral decubitus heel ulcers.  This has occurred in the setting of severe peripheral vascular disease and severe debility.  These will be very difficult to heal.  She is currently on daptomycin therapy.  2.  Pulmonary edema-secondary to heart failure and associated with bilateral pleural effusions  3.  Acute hypoxic respiratory failure-secondary to pulmonary edema  4.  Acute on chronic systolic/diastolic heart failure  5.  Paroxysmal atrial fibrillation  6.  History of paroxysmal atrial fibrillation  7.  Severe peripheral vascular disease  8.  Severe debility  9.  Type 2 diabetes mellitus-this places her at increased risk for recurrent infections  10.  Stage III chronic kidney disease  11.  Recent Covid 19 infection-23.  This has contributed to her debility    PLAN/RECOMMENDATIONS:   Thank you for asking us to see Susan Anderson, I recommend the followin.  Continue daptomycin  2.  Continue diuresis  3.  Address goals of care    I discussed her complex situation with her daughter by telephone today.  Her overall prognosis is  extremely poor.  Hospice/palliative care would be appropriate.       Stef Pete MD  4/18/2023  07:08 EDT

## 2023-04-18 NOTE — CONSULTS
[facility]  CARDIOLOGY CONSULTATION NOTE    Susan Anderson  : 1940  MRN:[id number]  Home Phone:[phone]    Date of Admission:2023  Date of Consultation: 23    PCP: Arleen Doherty MD    IDENTIFICATION: A 83 y.o. female resident of [place], [place]     Chief Complaint   Patient presents with    · Weakness - Generalized  · Acute on chronic HFrEF       PROBLEM LIST:   1. Cardiomyopathy/acute on chronic combined systolic/diastolic CHF  a. Echo, 2021: EF 43%. Global hypokinesis. More pronounced in the anteroseptal wall and apex. Severe mitral annular calcification. Mild to moderate MR. Moderate sized left pleural effusion.  b. C, 2021: EF 40-45%. Mild to moderate nonobstructive CAD involving multiple vessels without any evidence of severe or critical disease.  c. Echo, 2022: EF 46-50%. Mild MR.    d. Echo, 2022: EF 55%. Mild MR. Aortic sclerosis without aortic stenosis or regurgitation.  e. Echo, 2022: EF 55%. Mild to moderate MR.   f. Echo 2023: EF 41-45%, mild MR  2. Nonobstructive coronary artery disease  a. C, 2021:  EF 40-45%. Mild to moderate nonobstructive CAD involving multiple vessels without any evidence of severe or critical disease.  3. Paroxysmal atrial fibrillation.  4. Peripheral vascular disease  1. Lower extremity duplex, 2022: Bilateral lower extremity arterial duplex exam reveals diffuse calcified plaque without focal obstruction up to the level of bilateral popliteals. Below knee vessels revealed similar findings with non occlusive calcified plaque in the peroneal and anterior tibial arteries with flow demonstrated to the level of ankles. Bilateral posterior tibial flow could not be detected and is suggestive of probable occlusion. Bilateral ABIs are noncompressible due to vascular calcifications.  5. Hypertension   6. CKD, stage III  1. Renal artery duplex, 2022: Limited study due  "to the patient's body habitus and lack of cooperation. Elevated resistive indices which may reflect underlying intrinsic renal disease. No evidence of obstructive uropathy or significant renal artery stenosis.  7. Type II diabetes mellitus   8. Osteomyelitis s/p right great toe amputation      ALLERGIES:   Allergies   Allergen Reactions   • Vancomycin Other (See Comments)     Acute Kidney Injury, requested by ID   • Baclofen Other (See Comments) and Hallucinations     PSYCHOSIS-POA REFUSES ADMINISTRATION OF THIS MED.   • Cephalexin Nausea Only   • Erythromycin Base Nausea Only   • Melatonin Other (See Comments)     Nightmares   • Oxycodone Nausea Only   • Protonix [Pantoprazole] Itching and Rash   • Sulfa Antibiotics Nausea Only       HOME MEDICINES:   Current Outpatient Medications   Medication Instructions   • acetaminophen (TYLENOL) 650 mg, Oral, Every 6 Hours PRN   • apixaban (ELIQUIS) 2.5 mg, Oral, Every 12 Hours Scheduled   • atorvastatin (LIPITOR) 40 mg, Oral, Daily   • bumetanide (BUMEX) 1 mg, Oral, Daily   • clopidogrel (PLAVIX) 75 mg, Oral, Daily   • clotrimazole-betamethasone (Lotrisone) 1-0.05 % cream 1 application, Topical, 2 Times Daily   • cyclobenzaprine (FLEXERIL) 5 mg, Oral, 3 Times Daily PRN   • gabapentin (NEURONTIN) 100 mg, Oral, 2 Times Daily   • glucose blood (Accu-Chek SmartView) test strip 1 each, Other, 3 Times Daily, Use as instructed   • guaiFENesin (MUCINEX) 1,200 mg, Oral, 2 Times Daily PRN   • hydrOXYzine (ATARAX) 25 mg, Oral, Nightly PRN   • insulin glargine (LANTUS) 35 Units, Subcutaneous, Daily   • insulin lispro (HumaLOG) 100 UNIT/ML injection 3 units tid + Sliding Scale Before Meals:<BR>6 units- 200-250<BR>8 units- 251-300<BR>10 units- 301-350<BR>12 units - 351-400   • Insulin Pen Needle (Pen Needles 3/16\") 31G X 5 MM misc 1 each, Does not apply, Daily   • Lancets misc 1 each, Does not apply, 3 Times Daily   • losartan (COZAAR) 100 mg, Oral, Daily   • metoprolol succinate XL " (TOPROL-XL) 25 mg, Oral, Every 24 Hours Scheduled   • minocycline (MINOCIN,DYNACIN) 100 mg, Oral, 2 Times Daily   • multivitamin with minerals tablet tablet 1 tablet, Oral, Daily   • omeprazole (PRILOSEC) 20 mg, Oral, 2 Times Daily   • ondansetron ODT (ZOFRAN-ODT) 4 mg, Translingual, Every 8 Hours PRN   • polyethylene glycol (MIRALAX) 17 g, Oral, Daily PRN   • sennosides-docusate (PERICOLACE) 8.6-50 MG per tablet 1 tablet, Oral, Nightly PRN   • sertraline (ZOLOFT) 50 mg, Oral, Daily   • traMADol (ULTRAM) 50 mg, Oral, Every 6 Hours PRN       HPI: Mrs. Anderson is an 82 y/o female with NICM, CHF, nonobstructive CAD, PAF, PAD, HTN, DM2, and CKD who is seen in consultation for CHF. She has been followed by wound care for bilateral heel wounds. Recently diagnosed with UTI last week and started on Macrobid. Since then, she has had increased dyspnea, lethargy/somnolence and fatigue. She presented to the hospital for further evaluation. proBNP is 19K, CXR shows bilateral pleural effusions, R>L. She was placed on BiPAP and Cardiology has been asked to see her. She notes dyspnea got worse about 3-4 days prior to admission. She denies worsening LE edema. She denies chest pain. She is sedentary at home, mostly lays down or sits in chair, does not ambulate due to heel wounds. Currently she is on 2L NC. She was given IV Bumex 2mg on admission and has had about 800 cc out. She is on Bumex 1mg at home.   At the time of my evaluation patient was resting with BiPAP.  Reported feeling a bit better but just fatigue.    ROS: All systems have been reviewed and are negative with the exception of those mentioned in the HPI and problem list above.    Surgical History:   Past Surgical History:   Procedure Laterality Date   • ABDOMINAL HYSTERECTOMY W/SALPINGECTOMY     • AMPUTATION  Right great toe, 1st 1/3 metatarsal -Reg 4/14/21   • AORTAGRAM N/A 04/09/2021    Procedure: AORTAGRAM WITH OR WITHOUT RUNOFFS, WITH Co2;  Surgeon: Trey Forrest,  MD;  Location:  [facility];  Service: Vascular;  Laterality: N/A;   • AORTAGRAM N/A 09/28/2022    Procedure: CO2 ANGIOGRAM, LEFT SFA ANGIOPLASTY WITH DRUG ELUTING BALLOON, LEFT SFA STENT PLACEMENT ;  Surgeon: Trey Forrest MD;  [facility]:  [facility];  Service: Vascular;  Laterality: N/A;  FLUORO: 13.12  DOSE 162mGy  CONTRAST: Isovue 350: 15ml   • APPENDECTOMY     • BRAIN TUMOR EXCISION      laser surgery    • CARDIAC CATHETERIZATION N/A 06/21/2021    Procedure: LEFT HEART CATH;  Surgeon: Anjum Ceballos MD;  Location:  [facility];  Service: Cardiology;  Laterality: N/A;   • CATARACT EXTRACTION W/ INTRAOCULAR LENS  IMPLANT, BILATERAL     • CHOLECYSTECTOMY     • COLONOSCOPY     • ENDOSCOPY N/A 10/07/2022    Procedure: ESOPHAGOGASTRODUODENOSCOPY;  Surgeon: Stef Veras MD;  Location: [facility] ENDOSCOPY;  Service: Gastroenterology;  Laterality: N/A;   • EYE SURGERY     • FEMORAL ARTERY STENT  10/2022   • HYSTERECTOMY     • INTERVENTIONAL RADIOLOGY PROCEDURE N/A 1/15/2023    Procedure: CV INTERVENTIONAL RADIOLOGY PROCEDURE;  Surgeon: Sanket Zapata MD;  Location:  [facility] INVASIVE [facility];  Service: Interventional Radiology;  Laterality: N/A;   • TOE SURGERY     • TRANS METATARSAL AMPUTATION Right 04/14/2021    Procedure: AMPUTATION TRANS METATARSAL RIGHT GREAT TOE;  Surgeon: Valdez Finney MD;  Location: [facility] OR;  Service: Vascular;  Laterality: Right;       Social History:   Social History     Socioeconomic History   • Marital status:    • Number of children: 1   Tobacco Use   • Smoking status: Never   • Smokeless tobacco: Never   Vaping Use   • Vaping Use: Never used   Substance and Sexual Activity   • Alcohol use: Not Currently     Comment: Social drinking when not recovering from hospitalization   • Drug use: Never   • Sexual activity: Not Currently     Birth control/protection: None       Family History:   Family History   Problem Relation Age of Onset   • Diabetes  "Mother         Type II   • Heart disease Father    • Heart attack Father    • Coronary artery disease Father    • Diabetes Father         Type II   • Diabetes Sister    • Diabetes Maternal Grandmother    • Diabetes Maternal Grandfather    • Diabetes Paternal Grandmother    • Diabetes Paternal Grandfather        Objective     BP (!) 181/88 (BP Location: Left arm, Patient Position: Lying)   Pulse 57   Temp 96.6 °F (35.9 °C) (Axillary)   Resp 20   Ht 172.7 cm (68\")   Wt 85.4 kg (188 lb 4.8 oz)   SpO2 99%   BMI 28.63 kg/m²     Intake/Output Summary (Last 24 hours) at 4/18/2023 0932  Last data filed at 4/18/2023 0300  Gross per 24 hour   Intake --   Output 800 ml   Net -800 ml       PHYSICAL EXAM:  CONSTITUTIONAL: Well nourished, cooperative, in no acute distress, chronically ill appearing   HEENT: Normocephalic, atraumatic, PERRLA, no JVD  CARDIOVASCULAR:  Regular rhythm and normal rate, no murmur, gallop, rub.   RESPIRATORY: Diminished bibasilar sounds, normal respiratory effort, no wheezing,or rhonchi  GI: Soft, nontender, normal bowel sounds  EXTREMITIES: No gross deformities, trace edema. Bilateral LE wounds with dressings in place   NEUROLOGICAL: No focal deficits  PSYCHIATRIC: Normal mood and affect. Behavior is normal     Labs/Diagnostic Data  Results from last 7 days   Lab Units 04/18/23  0543 04/17/23  1517   SODIUM mmol/L 148* 142   POTASSIUM mmol/L 4.9 5.7*   CHLORIDE mmol/L 103 102   CO2 mmol/L 26.0 31.0*   BUN mg/dL 76* 80*   CREATININE mg/dL 1.40* 1.63*   GLUCOSE mg/dL 160* 289*   CALCIUM mg/dL 8.6 8.4*     Results from last 7 days   Lab Units 04/17/23 2027 04/17/23  1841 04/17/23  1517   CK TOTAL U/L  --   --  106   HSTROP T ng/L 310* 331* 326*     Results from last 7 days   Lab Units 04/18/23  0349 04/17/23  1517   WBC 10*3/mm3 6.37 8.08   HEMOGLOBIN g/dL 10.9* 11.7*   HEMATOCRIT % 36.3 39.3   PLATELETS 10*3/mm3 199 236     Results from last 7 days   Lab Units 04/17/23  1517   MAGNESIUM mg/dL 2.1 "         Results from last 7 days   Lab Units 04/17/23  1841   TSH uIU/mL 3.640         Results from last 7 days   Lab Units 04/17/23  1517   PROBNP pg/mL 19,401.0*  18,084.0*     I personally reviewed the patient's EKG/Telemetry data, EKG shows sinus rhythm with frequent PACs with nonspecific ST changes    Radiology Data:   CXR 4/17/23:  IMPRESSION:  1.Moderate right and small left pleural effusions.  2.Basilar predominant opacities with cardiomegaly and prominent pulmonary vasculature, suspicious for edema and atelectasis.    CT Chest 4/17/23:  IMPRESSION:  Impression:     1. At least moderate pulmonary edema pattern with large bilateral pleural effusions and bibasilar atelectasis. There is cardiomegaly. No definite consolidation identified though significant atelectatic changes are present. Superimposed pneumonia cannot   be excluded.  2. Ancillary findings as described above.    Current Medications:    apixaban, 2.5 mg, Oral, Q12H  atorvastatin, 40 mg, Oral, Nightly  bumetanide, 1 mg, Intravenous, BID  clopidogrel, 75 mg, Oral, Daily  [START ON 4/19/2023] DAPTOmycin, 4 mg/kg (Adjusted), Intravenous, Q48H  famotidine, 20 mg, Oral, BID AC  insulin lispro, 0-7 Units, Subcutaneous, TID With Meals  losartan, 50 mg, Oral, Q24H  polyethylene glycol, 17 g, Oral, Daily  senna-docusate sodium, 2 tablet, Oral, BID  sertraline, 50 mg, Oral, Daily      Pharmacy Consult - Pharmacy to dose,         Assessment:   1. Acute/chronic HFmrEF  A. Echo 01/2023 with EF 41-45%, mild MR  B. ProBNP 19K, CXR with bilateral pleural effusions and pulmonary edema   2. Bilateral pleural effusions, R>L  3. Chronic bilateral heel wounds   4. PAF   On chronic Eliquis 2.5mg BID for anticoagulation   Has been on metoprolol for rate control.   5. Hypertension, controlled  6. CKD III     Plan:   1. Agree with IV diuresis with Bumex 1mg BID with close attention to renal function.  We will repeat echocardiogram this admission.    2.  Hold off losartan  due to need for diuresis and concern about kidney function.  Resume home BB  3. ID and CTS to see for bilateral heel wounds with history of PAD.  4. Can consider thoracentesis of R pleural effusion if no clinical improvement.  5. Thank you for this consultation, we Will continue to follow.     Scribed for Anjum Ceballos MD by Lin Mix PA-C. 4/18/2023  09:55 EDT    I, Anjum Ceballos MD, personally performed the services described in this documentation as scribed by the above named individual in my presence, and it is both accurate and complete.  4/18/2023  11:55 EDT

## 2023-04-18 NOTE — PROGRESS NOTES
[facility] Medicine Services  PROGRESS NOTE    Patient Name: Susan Anderson  : 1940  MRN: [id number]    Date of Admission: 2023  Primary Care Physician: Arleen Doherty MD    Subjective   Subjective     CC: AMS    HPI: On BIPAP, difficult to assess. Denies pain currently at denies dyspnea.    ROS: Limited     Objective   Objective     Vital Signs:   Temp:  [96.6 °F (35.9 °C)-98 °F (36.7 °C)] 96.6 °F (35.9 °C)  Heart Rate:  [55-68] 57  Resp:  [16-26] 20  BP: (125-181)/() 181/88  Flow (L/min):  [3] 3     Physical Exam:  Lethargic but arouses, denies pain  OP clear, dry MM  Neck supple  No LAD  RRR  Decreased at bases, worse on R, with poor air excursion overall  +BS, soft  GARG  B LE wrapped, legs remain cool  Flat affect    Results Reviewed:  LAB RESULTS:      Lab 23  0349 23  1517   WBC 6.37 8.08   HEMOGLOBIN 10.9* 11.7*   HEMATOCRIT 36.3 39.3   PLATELETS 199 236   NEUTROS ABS 4.16 6.11   IMMATURE GRANS (ABS) 0.67* 0.51*   LYMPHS ABS 0.86 0.82   MONOS ABS 0.59 0.58   EOS ABS 0.07 0.04   MCV 99.7* 101.6*   CRP  --  8.02*   PROCALCITONIN  --  0.13   LACTATE  --  1.1   LDH  --  527*   D DIMER QUANT  --  2.10*         Lab 23  0543 23  1841 23  1517   SODIUM 148*  --  142   POTASSIUM 4.9  --  5.7*   CHLORIDE 103  --  102   CO2 26.0  --  31.0*   ANION GAP 19.0*  --  9.0   BUN 76*  --  80*   CREATININE 1.40*  --  1.63*   EGFR 37.2*  --  31.0*   GLUCOSE 160*  --  289*   CALCIUM 8.6  --  8.4*   MAGNESIUM  --   --  2.1   TSH  --  3.640  --          Lab 23  1517   TOTAL PROTEIN 5.9*   ALBUMIN 2.8*   GLOBULIN 3.1   ALT (SGPT) 22   AST (SGOT) 40*   BILIRUBIN 0.6   ALK PHOS 208*         Lab 23  2027 23  1841 23  1517   PROBNP  --   --  19,401.0*  18,084.0*   HSTROP T 310* 331* 326*             Lab 23  1517   FERRITIN 63.04         Lab 23  0651 23  0329 23  0024   FIO2 30 30 21   CARBOXYHEMOGLOBIN  (VENOUS) 1.9 1.9 1.7     Brief Urine Lab Results  (Last result in the past 365 days)      Color   Clarity   Blood   Leuk Est   Nitrite   Protein   CREAT   Urine HCG        04/17/23 1528 Yellow   Clear   Trace   Negative   Negative   >=300 mg/dL (3+)                 Microbiology Results Abnormal     None          CT Chest Without Contrast Diagnostic    Result Date: 4/17/2023  CT CHEST WO CONTRAST DIAGNOSTIC Date of Exam: 4/17/2023 7:49 PM EDT Indication: Pneumonia, complication suspected, xray done. Comparison: 4/17/2023, 1/20/2023 Technique: Axial CT images were obtained of the chest without contrast administration.  Reconstructed coronal and sagittal images were also obtained. Automated exposure control and iterative construction methods were used. Findings: Hilum and Mediastinum: No pathologically enlarged lymph nodes. The heart appears enlarged. No pericardial effusion.  Unremarkable thoracic aorta and pulmonary arteries. Lung Parenchyma and Pleura: Large bilateral pleural effusions are present. Intralobular septal thickening is present. Atelectatic changes are present within the bilateral lower lobes. No definite consolidation identified though evaluation is limited due to significant atelectasis. Atherosclerotic calcifications are present including within the pulmonary arteries. Granulomatous calcifications are present. Upper Abdomen: No acute process. Soft tissues: Unremarkable. Osseous structures: No aggressive focal lytic or sclerotic osseous lesions.     Impression: Impression: 1. At least moderate pulmonary edema pattern with large bilateral pleural effusions and bibasilar atelectasis. There is cardiomegaly. No definite consolidation identified though significant atelectatic changes are present. Superimposed pneumonia cannot be excluded. 2. Ancillary findings as described above. Electronically Signed: Karen Rey  4/17/2023 8:10 PM EDT  Workstation ID: [id number]    XR Chest 1 View    Result Date:  4/17/2023  XR CHEST 1 VW Date of Exam: 4/17/2023 3:46 PM EDT Indication: Weak/Dizzy/AMS triage protocol. Comparison: January 28, 2023 FINDINGS: There is a suspected moderate right and a small left pleural effusion. There are basilar predominant opacities with prominent pulmonary vasculature. The heart appears enlarged, as before. Right IJ catheter is no longer seen.  No pneumothorax is identified.     Impression: 1.Moderate right and small left pleural effusions. 2.Basilar predominant opacities with cardiomegaly and prominent pulmonary vasculature, suspicious for edema and atelectasis.  Electronically Signed: Billy Leonie  4/17/2023 4:11 PM EDT  Workstation ID: [id number]      Results for orders placed during the hospital encounter of 01/03/23    Adult Transthoracic Echo Complete W/ Cont if Necessary Per Protocol    Interpretation Summary  •  Left ventricular systolic function is moderately decreased. Left ventricular ejection fraction appears to be 41 - 45%.  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  •  Mild mitral regurgitation.  •  Trace tricuspid regurgitation.  •  There is a moderate sized left pleural effusion.      Current medications:  Scheduled Meds:apixaban, 2.5 mg, Oral, Q12H  atorvastatin, 40 mg, Oral, Nightly  bumetanide, 1 mg, Intravenous, BID  clopidogrel, 75 mg, Oral, Daily  [START ON 4/19/2023] DAPTOmycin, 4 mg/kg (Adjusted), Intravenous, Q48H  insulin lispro, 0-7 Units, Subcutaneous, TID With Meals  losartan, 50 mg, Oral, Q24H  polyethylene glycol, 17 g, Oral, Daily  senna-docusate sodium, 2 tablet, Oral, BID  sertraline, 50 mg, Oral, Daily      Continuous Infusions:Pharmacy Consult - Pharmacy to dose,       PRN Meds:.•  senna-docusate sodium **AND** polyethylene glycol **AND** bisacodyl **AND** bisacodyl  •  clonazePAM  •  nitroglycerin  •  Pharmacy Consult - Pharmacy to dose  •  sodium chloride  •  traMADol    Assessment & Plan   Assessment & Plan      Active Hospital Problems    Diagnosis  POA   • **Cellulitis of right foot due to methicillin-resistant Staphylococcus aureus [L03.115, B95.62]  Yes   • Acute respiratory failure with hypoxia and hypercarbia [J96.01, J96.02]  Unknown   • T2DM  [E11.9]  Yes   • Paroxysmal atrial fibrillation [I48.0]  Yes   • Chronic combined systolic and diastolic heart failure  [I50.42]  Yes   • NICM (nonischemic cardiomyopathy) ([facility]) [I42.8]  Yes   • Dyslipidemia [E78.5]  Yes   • Mitral valve disease [I05.9]  Yes   • Acute on chronic systolic CHF (congestive heart failure) [I50.23]  Yes   • Diabetic foot ulcer [E11.621, L97.509]  Yes   • PVD (peripheral vascular disease) ([facility]) [I73.9]  Yes   • Diabetes mellitus with neuropathy [E11.40]  Yes   • Stage 3a chronic kidney disease [N18.31]  Yes   • Essential hypertension [I10]  Yes      Resolved Hospital Problems   No resolved problems to display.        Brief Hospital Course to date:  Susan Anderson is a 82 y.o. chronically ill female w/ HTN, HLD, CKD3, CAD, PAD, HFrEF (41-45%), PUD/GIB, pAfib, DM2, with 6 admissions since August 2022 (predominantly for CHF). She was admitted 1/3-1/20 after PEA arrest requiring CPR after receiving propofol for EGD; and then COVID and acute renal failure requiring initiation of dialysis. Since then she has been battling bilateral heel wounds and been following with [facility] and Dr. Finney and outpatient PT. She has had peripheral stent placed as well.     Metabolic encephalopathy  -possibly due to elevated carbon dioxide  -on BIPAP  -monitor progress     A/C HFrEF- ED 40%  Bilateral pleural effusions  -diuresis with bumex  -monitor electrolytes  -hyperkalemia better  -ask for cardiology assistance  -R effusion may require IR    Hx of PAF  -on Eliquis    Hx of PUD/GIB  -PPI allergy listed  -pepcid     Chronic bilateral heel wounds  -followed by Dr. Finney/[facility]  -on daptomycin  -wound care     Deconditioned  -PT/OT     T2DM  -SSI     DVT  prophylaxis:  Medical DVT prophylaxis orders are present.    Expected Discharge Location and Transportation: Rehab  Expected Discharge   Expected Discharge Date and Time     Expected Discharge Date Expected Discharge Time    Apr 21, 2023            DVT prophylaxis:  Medical DVT prophylaxis orders are present.     AM-PAC 6 Clicks Score (PT): 12 (04/17/23 2051)    CODE STATUS:   Code Status and Medical Interventions:   Ordered at: 04/17/23 1800     Code Status (Patient has no pulse and is not breathing):    No CPR (Do Not Attempt to Resuscitate)     Medical Interventions (Patient has pulse or is breathing):    Full       Breezy Felder MD  04/18/23

## 2023-04-18 NOTE — CONSULTS
[facility] Cardiothoracic Surgery In-Patient Consult    Name:  Susan Anderson  MRN Number:  [id number]  Date of Admission:  4/17/2023  Date of Consultation: 4/18/2023    Consulting Provider:    PCP: Arleen Doherty MD  IP Care Team:  Patient Care Team:  Arleen Doherty MD as PCP - General (Internal Medicine)  Flory Sauer APRN (Nurse Practitioner)  Janeth Lam MD as Consulting Physician (Endocrinology)  Anjum Ceballos MD as Consulting Physician (Cardiology)    Reason for Consultation: PAD/non-healing bilateral heel ulcerations    History of Present Illness:    Susan Anderson is a 83 y.o. female with a history of insulin-dependent type 2 diabetes w/ neuropathy-A1c 8.3, gangrene s/p right great toe amputation with Dr. Finney on 4/14/2021 stage III CKD, acute on chronic CHF-EF 41 to 45%, paroxysmal A-fib s/p Watchman, HTN, HLD, CAD s/p, syncope, nonischemic cardiomyopathy, PVD/PAD s/p left SFA stent on 09/20/22 with Dr. Forrest, sleep apnea-wears 2 L of home oxygen at night, anemia, GI bleed.  Patient presented to [facility] on 4/17 for nonhealing bilateral heel ulcerations. States the right heel ulceration has been present for 4 months and is not sure when the left one started. Has been followed by wound care here and Dr. Stef Pete with ID. Her wound culture on 4/12/23 was positive for MRSA. Labs significant on admission: Troponin 326, proBNP 19,401, potassium 5.7, and CRP 8.02.  CT chest noted moderate bilateral pleural effusions and cardiomegaly. She is being diuresed with IV bumex.  She is on Eliquis and Plavix at home.  Our service was consulted for further management of PAD and non-healing ulcerations.     Review of Systems:  Review of Systems   Constitutional: Positive for fatigue.   HENT: Negative.    Eyes: Negative.    Respiratory: Positive for shortness of breath.    Gastrointestinal: Negative.    Endocrine: Negative.    Genitourinary: Negative.    Musculoskeletal: Positive for  arthralgias and myalgias.   Skin: Positive for wound.   Allergic/Immunologic: Negative.    Neurological: Negative.    Hematological: Bruises/bleeds easily.   Psychiatric/Behavioral: Negative.        Hospital Problems:   Cellulitis of right foot due to methicillin-resistant Staphylococcus aureus    Diabetic foot ulcer    PVD (peripheral vascular disease) (HCC)    Diabetes mellitus with neuropathy    Essential hypertension    Stage 3a chronic kidney disease    Acute on chronic systolic CHF (congestive heart failure)    Mitral valve disease    NICM (nonischemic cardiomyopathy) ([facility])    Dyslipidemia    Chronic combined systolic and diastolic heart failure     Paroxysmal atrial fibrillation    T2DM     Acute respiratory failure with hypoxia and hypercarbia       Past Medical History:    Past Medical History:   Diagnosis Date   • Anemia 8/22   • Anxiety    • Arthritis    • Asthma 6/4 - double pneumonia    Currently on inhaler and nebulizer   • Atrial fibrillation 08/21/2022   • Brain tumor     Benign and followed at    • Cancer     cervical cancer, skin cancer   • CHF (congestive heart failure) 06/04/2021   • Chronic kidney disease Related to diabetes   • Clotting disorder 10/22    Upper GI bleed from blood thinners aggravate ulcers   • Coronary artery disease 6/4/2021 DX for hear failure   • COVID-19 01/28/2023   • Depression 8/22   • Diabetes mellitus 30 years    Seeing Dr. Lam 1st time Aug 19   • Dizziness 07/27/2022   • Effusion of right knee 08/12/2022   • Fall 07/27/2022   • GERD (gastroesophageal reflux disease)    • Gout    • History of medical problems 8/22    AFIB   • History of staph infection 10/2021    right toe   • History of transfusion     no reactions, 1 unit, [facility]   • HL (hearing loss)     Acoustic neuroma right   • Hx of colonoscopy    • Hyperlipidemia Reference current labs x 2-3yrs approx   • Hypertension 30 years   • Hypomagnesemia 10/06/2022   • Insomnia    • Low back pain    • Migraine    •  Mitral valve disease    • Mitral valve disease    • Osteomyelitis    • Peptic ulceration 10/22    Secondary to blood thinners   • Peripheral neuropathy    • Physical deconditioning 05/17/2022   • Pneumonia due to infectious organism 06/04/2021   • Pressure injury of skin of sacral region 08/21/2022   • Renal insufficiency 2021   • Sepsis 01/28/2023   • Sleep apnea    • Syncope, unspecified syncope type 10/06/2022   • Type 2 diabetes mellitus     30 years   • Weakness 07/27/2022       Past Surgical History:    Past Surgical History:   Procedure Laterality Date   • ABDOMINAL HYSTERECTOMY W/SALPINGECTOMY     • AMPUTATION  Right great toe, 1st 1/3 metatarsal -Reg 4/14/21   • AORTAGRAM N/A 04/09/2021    Procedure: AORTAGRAM WITH OR WITHOUT RUNOFFS, WITH Co2;  Surgeon: Trey Forrest MD;  Location:  [facility];  Service: Vascular;  Laterality: N/A;   • AORTAGRAM N/A 09/28/2022    Procedure: CO2 ANGIOGRAM, LEFT SFA ANGIOPLASTY WITH DRUG ELUTING BALLOON, LEFT SFA STENT PLACEMENT ;  Surgeon: Trey Forrest MD;  Location:  [facility];  Service: Vascular;  Laterality: N/A;  FLUORO: 13.12  DOSE 162mGy  CONTRAST: Isovue 350: 15ml   • APPENDECTOMY     • BRAIN TUMOR EXCISION      laser surgery    • CARDIAC CATHETERIZATION N/A 06/21/2021    Procedure: LEFT HEART CATH;  Surgeon: Anjum Ceballos MD;  Location:  [facility];  Service: Cardiology;  Laterality: N/A;   • CATARACT EXTRACTION W/ INTRAOCULAR LENS  IMPLANT, BILATERAL     • CHOLECYSTECTOMY     • COLONOSCOPY     • ENDOSCOPY N/A 10/07/2022    Procedure: ESOPHAGOGASTRODUODENOSCOPY;  Surgeon: Stef Veras MD;  Location:  [facility];  Service: Gastroenterology;  Laterality: N/A;   • EYE SURGERY     • FEMORAL ARTERY STENT  10/2022   • HYSTERECTOMY     • INTERVENTIONAL RADIOLOGY PROCEDURE N/A 1/15/2023    Procedure: CV INTERVENTIONAL RADIOLOGY PROCEDURE;  Surgeon: Sanket Zapata MD;  Location:  [facility];  Service:  Interventional Radiology;  Laterality: N/A;   • TOE SURGERY     • TRANS METATARSAL AMPUTATION Right 04/14/2021    Procedure: AMPUTATION TRANS METATARSAL RIGHT GREAT TOE;  Surgeon: Valdez Finney MD;  Location: [facility];  Service: Vascular;  Laterality: Right;       Family History:    Family History   Problem Relation Age of Onset   • Diabetes Mother         Type II   • Heart disease Father    • Heart attack Father    • Coronary artery disease Father    • Diabetes Father         Type II   • Diabetes Sister    • Diabetes Maternal Grandmother    • Diabetes Maternal Grandfather    • Diabetes Paternal Grandmother    • Diabetes Paternal Grandfather        Social History:    Social History     Socioeconomic History   • Marital status:    • Number of children: 1   Tobacco Use   • Smoking status: Never   • Smokeless tobacco: Never   Vaping Use   • Vaping Use: Never used   Substance and Sexual Activity   • Alcohol use: Not Currently     Comment: Social drinking when not recovering from hospitalization   • Drug use: Never   • Sexual activity: Not Currently     Birth control/protection: None       Allergies:  Allergies   Allergen Reactions   • Vancomycin Other (See Comments)     Acute Kidney Injury, requested by ID   • Baclofen Other (See Comments) and Hallucinations     PSYCHOSIS-POA REFUSES ADMINISTRATION OF THIS MED.   • Cephalexin Nausea Only   • Erythromycin Base Nausea Only   • Melatonin Other (See Comments)     Nightmares   • Oxycodone Nausea Only   • Protonix [Pantoprazole] Itching and Rash   • Sulfa Antibiotics Nausea Only         Physical Exam:  Vital Signs:    Temp:  [96.6 °F (35.9 °C)-98 °F (36.7 °C)] 96.6 °F (35.9 °C)  Heart Rate:  [55-68] 57  Resp:  [16-26] 20  BP: (125-181)/() 181/88  Body mass index is 28.63 kg/m².     Physical Exam  Vitals and nursing note reviewed.   Constitutional:       Appearance: She is obese.   HENT:      Head: Normocephalic.   Eyes:      Pupils: Pupils are equal, round, and  reactive to light.   Cardiovascular:      Rate and Rhythm: Rhythm irregular.      Pulses:           Dorsalis pedis pulses are 1+ on the right side and 1+ on the left side.   Pulmonary:      Effort: Pulmonary effort is normal.   Abdominal:      General: Bowel sounds are normal.   Musculoskeletal:         General: Normal range of motion.      Cervical back: Normal range of motion.   Skin:     Coloration: Skin is pale.      Comments: See images below   Psychiatric:         Mood and Affect: Mood normal.     Right foot:                    Left heel:          Labs/Imaging/Procedures:   Results from last 7 days   Lab Units 04/18/23  0543 04/17/23  1517   SODIUM mmol/L 148* 142   POTASSIUM mmol/L 4.9 5.7*   CHLORIDE mmol/L 103 102   CO2 mmol/L 26.0 31.0*   BUN mg/dL 76* 80*   CREATININE mg/dL 1.40* 1.63*   CALCIUM mg/dL 8.6 8.4*   BILIRUBIN mg/dL  --  0.6   ALK PHOS U/L  --  208*   ALT (SGPT) U/L  --  22   AST (SGOT) U/L  --  40*   GLUCOSE mg/dL 160* 289*     Results from last 7 days   Lab Units 04/17/23 2027 04/17/23  1841 04/17/23  1517   CK TOTAL U/L  --   --  106   HSTROP T ng/L 310* 331* 326*     Results from last 7 days   Lab Units 04/18/23  0349 04/17/23  1517   WBC 10*3/mm3 6.37 8.08   HEMOGLOBIN g/dL 10.9* 11.7*   HEMATOCRIT % 36.3 39.3   PLATELETS 10*3/mm3 199 236         Results from last 7 days   Lab Units 04/17/23  1517   MAGNESIUM mg/dL 2.1         CT Chest Without Contrast Diagnostic    Result Date: 4/17/2023  Impression: 1. At least moderate pulmonary edema pattern with large bilateral pleural effusions and bibasilar atelectasis. There is cardiomegaly. No definite consolidation identified though significant atelectatic changes are present. Superimposed pneumonia cannot be excluded. 2. Ancillary findings as described above. Electronically Signed: Karen Rey  4/17/2023 8:10 PM EDT  Workstation ID: [id number]    XR Chest 1 View    Result Date: 4/17/2023  1.Moderate right and small left pleural effusions.  2.Basilar predominant opacities with cardiomegaly and prominent pulmonary vasculature, suspicious for edema and atelectasis.  Electronically Signed: Billy Hadley  4/17/2023 4:11 PM EDT  Workstation ID: [id number]     [facility]: Results for orders placed during the hospital encounter of 01/03/23    Cardiac Catheterization/Vascular Study    Narrative  Clinical indication: 82-year-old female with oliguria, and need for dialysis    : Dr. Coles Given    Procedures:  1.  Ultrasound-guided, right internal jugular vein temporary dialysis catheter placement  2.  Conscious sedation    Conscious sedation: Moderate sedation was provided by me for a total of 31 minutes.  Physical vitals were continuously monitored by an independently trained observer.  A total of 3 mg of Versed and 75 ug of fentanyl were administered intravenously.    Estimated blood loss: Negligible    Contrast usage: None    Complication: None apparent    Technique: Formal written consent for the procedure was obtained from the patient/family after explaining risks to include bleeding, infection, injury to vascular structures, need for additional surgery/procedure.  The right neck region was prepped and draped in the usual, sterile fashion.  Local anesthesia with 1% lidocaine infiltration was achieved.  Additionally, intravenous fentanyl and Versed were given for patient comfort throughout the procedure, the details of which are documented in the nursing record.  Attempts to place the temporary dialysis catheter on the angiography was not feasible, despite intravenous fentanyl and Versed, with the patient complaining of buttocks pain.  The patient was placed back on her stretcher, and she was more comfortable in this position, and the right neck region was again prepped and draped in the usual, sterile fashion.  Local anesthesia with 1% lidocaine infiltration was achieved.  Utilizing ultrasound guidance, the right internal jugular vein was accessed with a  micropuncture kit, and subsequently dilated to accommodate placement of a 14 Peruvian by 15 cm Schon double-lumen temporary dialysis catheter.  Both ports of the catheter flushed and aspirated easily, and the catheter was sutured into place with a sterile dressing applied.  A portable chest x-ray was then obtained in the Cath Lab, confirming appropriate catheter placement.    Impression: Successful ultrasound-guided placement of 14 Peruvian double-lumen Schon right internal jugular vein temporary dialysis catheter.  Catheter placement was confirmed with portable chest x-ray in the Cath Lab, and the catheter is ready for immediate use.     Echo: Results for orders placed during the hospital encounter of 01/03/23    Adult Transthoracic Echo Complete W/ Cont if Necessary Per Protocol    Interpretation Summary  •  Left ventricular systolic function is moderately decreased. Left ventricular ejection fraction appears to be 41 - 45%.  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  •  Mild mitral regurgitation.  •  Trace tricuspid regurgitation.  •  There is a moderate sized left pleural effusion.     Carotid Duplex: Results for orders placed during the hospital encounter of 09/21/22    Duplex Lower Extremity Art / Grafts - Bilateral CAR    Interpretation Summary  · Bilateral lower extremity arterial duplex exam reveals diffuse calcified plaque without focal obstruction up to the level of bilateral popliteals.  · Below knee vessels revealed similar findings with non occlusive calcified plaque in the peroneal and anterior tibial arteries with flow demonstrated to the level of ankles. Bilateral posterior tibial flow could not be detected and is suggestive of probable occlusion.  · Bilateral ABIs are noncompressible due to vascular calcifications.    ABG:   Site   Date Value Ref Range Status   04/18/2023 Nurse/ Draw  Final     Hemoglobin, Blood Gas   Date Value Ref Range Status    04/18/2023 10.8 (L) 14 - 18 g/dL Final     Oxyhemoglobin Venous   Date Value Ref Range Status   04/18/2023 71.4 % Final     Methemoglobin Venous   Date Value Ref Range Status   04/18/2023 0.4 % Final     Carboxyhemoglobin Venous   Date Value Ref Range Status   04/18/2023 1.9 % Final     CO2 Content   Date Value Ref Range Status   04/18/2023 37.8 (H) 22 - 33 mmol/L Final     Barometric Pressure for Blood Gas   Date Value Ref Range Status   04/18/2023   Final     Comment:     N/A     Modality   Date Value Ref Range Status   04/18/2023 BiPap  Final     FIO2   Date Value Ref Range Status   04/18/2023 30 % Final        Assessment:    Cellulitis of right foot due to methicillin-resistant Staphylococcus aureus    Diabetic foot ulcer    PVD (peripheral vascular disease) ([facility])    Diabetes mellitus with neuropathy    Essential hypertension    Stage 3a chronic kidney disease    Acute on chronic systolic CHF (congestive heart failure)    Mitral valve disease    NICM (nonischemic cardiomyopathy) ([facility])    Dyslipidemia    Chronic combined systolic and diastolic heart failure     Paroxysmal atrial fibrillation    T2DM     Acute respiratory failure with hypoxia and hypercarbia      Plan:  -Will discuss with Dr. Colvin  -Continue with medical optimization at this time        INDIGO Nation  11:01 EDT  04/18/23     Thank you for allowing us to participate in the care of your patient. Please do not hesitate to contact us with additional questions or concerns.     --    I reviewed this documentation. I interviewed and examined this patient this morning. She had bilateral heel pressure ulcers and history of R hallux amputation in the setting of severe PAD. Please obtain bilateral full lower extremity segmental blood pressures and pulse volume recordings. No plan for surgical intervention during this admission for heart failure, follow up in clinic in 1-2 weeks after discharge.      Billy Colvin M.D.,  DIANE  Cardiothoracic and Vascular Surgeon  [facility]

## 2023-04-18 NOTE — NURSING NOTE
WOC team consulted for bilateral heel wounds but upon review of chart noted that patient is being followed by PT wound care for the treatment of these wounds including as an outpatient.  Tried to reach PT wound therapist by phone but unable to reach them at this time.  Entered order for PT wound care to treat these wounds for continuity of care.    Sensory Perception: 4-->no impairment  Moisture: 2-->very moist  Activity: 2-->chairfast  Mobility: 3-->slightly limited  Nutrition: 4-->excellent  Friction and Shear: 2-->potential problem  Malachi Score: 17 (04/17/23 2051)    Please implement pressure injury prevention interventions for patients with a Malachi score of 18 or less per protocol.  See orders for recommendations.    Thank you for the consult.  WOC team will sign off.  If alteration to skin integrity or change in wound bed presentation please consult the WOC team.

## 2023-04-19 ENCOUNTER — APPOINTMENT (OUTPATIENT)
Dept: ULTRASOUND IMAGING | Facility: HOSPITAL | Age: 83
DRG: 682 | End: 2023-04-19
Payer: MEDICARE

## 2023-04-19 ENCOUNTER — APPOINTMENT (OUTPATIENT)
Dept: CARDIOLOGY | Facility: HOSPITAL | Age: 83
DRG: 682 | End: 2023-04-19
Payer: MEDICARE

## 2023-04-19 ENCOUNTER — APPOINTMENT (OUTPATIENT)
Dept: GENERAL RADIOLOGY | Facility: HOSPITAL | Age: 83
DRG: 682 | End: 2023-04-19
Payer: MEDICARE

## 2023-04-19 LAB
ALBUMIN SERPL-MCNC: 2.3 G/DL (ref 3.5–5.2)
ALBUMIN/GLOB SERPL: 0.9 G/DL
ALP SERPL-CCNC: 164 U/L (ref 39–117)
ALT SERPL W P-5'-P-CCNC: 13 U/L (ref 1–33)
ANION GAP SERPL CALCULATED.3IONS-SCNC: 15 MMOL/L (ref 5–15)
APPEARANCE FLD: CLEAR
AST SERPL-CCNC: 19 U/L (ref 1–32)
BH CV ECHO MEAS - AO MAX PG: 5.9 MMHG
BH CV ECHO MEAS - AO MEAN PG: 3.8 MMHG
BH CV ECHO MEAS - AO ROOT DIAM: 3 CM
BH CV ECHO MEAS - AO V2 MAX: 121.9 CM/SEC
BH CV ECHO MEAS - AO V2 VTI: 30.3 CM
BH CV ECHO MEAS - AVA(I,D): 1.49 CM2
BH CV ECHO MEAS - EDV(CUBED): 74.2 ML
BH CV ECHO MEAS - EDV(MOD-SP2): 63 ML
BH CV ECHO MEAS - EDV(MOD-SP4): 52 ML
BH CV ECHO MEAS - EF(MOD-BP): 38 %
BH CV ECHO MEAS - EF(MOD-SP2): 33.3 %
BH CV ECHO MEAS - EF(MOD-SP4): 38.5 %
BH CV ECHO MEAS - ESV(CUBED): 45 ML
BH CV ECHO MEAS - ESV(MOD-SP2): 42 ML
BH CV ECHO MEAS - ESV(MOD-SP4): 32 ML
BH CV ECHO MEAS - FS: 15.4 %
BH CV ECHO MEAS - IVS/LVPW: 1.11 CM
BH CV ECHO MEAS - IVSD: 1.13 CM
BH CV ECHO MEAS - LA DIMENSION: 3.6 CM
BH CV ECHO MEAS - LAT PEAK E' VEL: 7.5 CM/SEC
BH CV ECHO MEAS - LV MASS(C)D: 151.8 GRAMS
BH CV ECHO MEAS - LV MAX PG: 2.28 MMHG
BH CV ECHO MEAS - LV MEAN PG: 1.21 MMHG
BH CV ECHO MEAS - LV V1 MAX: 75.5 CM/SEC
BH CV ECHO MEAS - LV V1 VTI: 17.8 CM
BH CV ECHO MEAS - LVIDD: 4.2 CM
BH CV ECHO MEAS - LVIDS: 3.6 CM
BH CV ECHO MEAS - LVOT AREA: 2.5 CM2
BH CV ECHO MEAS - LVOT DIAM: 1.8 CM
BH CV ECHO MEAS - LVPWD: 1.02 CM
BH CV ECHO MEAS - MED PEAK E' VEL: 5.4 CM/SEC
BH CV ECHO MEAS - MV A MAX VEL: 94.3 CM/SEC
BH CV ECHO MEAS - MV DEC SLOPE: 432.8 CM/SEC2
BH CV ECHO MEAS - MV DEC TIME: 0.4 MSEC
BH CV ECHO MEAS - MV E MAX VEL: 66.6 CM/SEC
BH CV ECHO MEAS - MV E/A: 0.71
BH CV ECHO MEAS - MV P1/2T: 65.7 MSEC
BH CV ECHO MEAS - MVA(P1/2T): 3.3 CM2
BH CV ECHO MEAS - PA ACC SLOPE: 391.8 CM/SEC2
BH CV ECHO MEAS - PA ACC TIME: 0.14 SEC
BH CV ECHO MEAS - PA PR(ACCEL): 17.2 MMHG
BH CV ECHO MEAS - SV(LVOT): 45 ML
BH CV ECHO MEAS - SV(MOD-SP2): 21 ML
BH CV ECHO MEAS - SV(MOD-SP4): 20 ML
BH CV ECHO MEAS - TAPSE (>1.6): 2.1 CM
BH CV ECHO MEASUREMENTS AVERAGE E/E' RATIO: 10.33
BH CV LOWER ARTERIAL LEFT ABI RATIO: 0.81
BH CV LOWER ARTERIAL LEFT DORSALIS PEDIS SYS MAX: >255
BH CV LOWER ARTERIAL LEFT GREAT TOE SYS MAX: 53
BH CV LOWER ARTERIAL LEFT HIGH THIGH SYS MAX: >255
BH CV LOWER ARTERIAL LEFT LOW THIGH SYS MAX: >255
BH CV LOWER ARTERIAL LEFT POST TIBIAL SYS MAX: 150
BH CV LOWER ARTERIAL LEFT TBI RATIO: 0.28
BH CV LOWER ARTERIAL RIGHT ABI RATIO: 0.84
BH CV LOWER ARTERIAL RIGHT DORSALIS PEDIS SYS MAX: >255
BH CV LOWER ARTERIAL RIGHT HIGH THIGH SYS MAX: >255
BH CV LOWER ARTERIAL RIGHT LOW THIGH SYS MAX: >255
BH CV LOWER ARTERIAL RIGHT POST TIBIAL SYS MAX: 156
BILIRUB SERPL-MCNC: 0.8 MG/DL (ref 0–1.2)
BUN SERPL-MCNC: 71 MG/DL (ref 8–23)
BUN/CREAT SERPL: 51.1 (ref 7–25)
CALCIUM SPEC-SCNC: 8.3 MG/DL (ref 8.6–10.5)
CHLORIDE SERPL-SCNC: 104 MMOL/L (ref 98–107)
CO2 SERPL-SCNC: 30 MMOL/L (ref 22–29)
COLOR FLD: YELLOW
CREAT SERPL-MCNC: 1.39 MG/DL (ref 0.57–1)
DEPRECATED RDW RBC AUTO: 65.8 FL (ref 37–54)
EGFRCR SERPLBLD CKD-EPI 2021: 37.5 ML/MIN/1.73
ERYTHROCYTE [DISTWIDTH] IN BLOOD BY AUTOMATED COUNT: 18.1 % (ref 12.3–15.4)
GLOBULIN UR ELPH-MCNC: 2.6 GM/DL
GLUCOSE BLDC GLUCOMTR-MCNC: 217 MG/DL (ref 70–130)
GLUCOSE BLDC GLUCOMTR-MCNC: 265 MG/DL (ref 70–130)
GLUCOSE SERPL-MCNC: 216 MG/DL (ref 65–99)
HCT VFR BLD AUTO: 34.9 % (ref 34–46.6)
HGB BLD-MCNC: 10.6 G/DL (ref 12–15.9)
INR PPP: 1.58 (ref 0.84–1.13)
LEFT ATRIUM VOLUME INDEX: 19.4 ML/M2
LYMPHOCYTES NFR FLD MANUAL: 44 %
MACROPHAGE FLUID: 46 %
MAXIMAL PREDICTED HEART RATE: 136 BPM
MAXIMAL PREDICTED HEART RATE: 136 BPM
MCH RBC QN AUTO: 30.3 PG (ref 26.6–33)
MCHC RBC AUTO-ENTMCNC: 30.4 G/DL (ref 31.5–35.7)
MCV RBC AUTO: 99.7 FL (ref 79–97)
MESOTHL CELL NFR FLD MANUAL: 1 %
MONOCYTES NFR FLD: 2 %
NEUTROPHILS NFR FLD MANUAL: 7 %
PLATELET # BLD AUTO: 200 10*3/MM3 (ref 140–450)
PMV BLD AUTO: 10.4 FL (ref 6–12)
POTASSIUM SERPL-SCNC: 4.3 MMOL/L (ref 3.5–5.2)
PROT SERPL-MCNC: 4.9 G/DL (ref 6–8.5)
PROTHROMBIN TIME: 18.9 SECONDS (ref 11.4–14.4)
RBC # BLD AUTO: 3.5 10*6/MM3 (ref 3.77–5.28)
RBC # FLD AUTO: <2000 /MM3
SODIUM SERPL-SCNC: 149 MMOL/L (ref 136–145)
STRESS TARGET HR: 116 BPM
STRESS TARGET HR: 116 BPM
UPPER ARTERIAL LEFT ARM BRACHIAL SYS MAX: 186 MMHG
UPPER ARTERIAL RIGHT ARM BRACHIAL SYS MAX: 185 MMHG
WBC # FLD AUTO: 153 /MM3
WBC NRBC COR # BLD: 7.25 10*3/MM3 (ref 3.4–10.8)

## 2023-04-19 PROCEDURE — 71045 X-RAY EXAM CHEST 1 VIEW: CPT

## 2023-04-19 PROCEDURE — 94799 UNLISTED PULMONARY SVC/PX: CPT

## 2023-04-19 PROCEDURE — 87015 SPECIMEN INFECT AGNT CONCNTJ: CPT | Performed by: HOSPITALIST

## 2023-04-19 PROCEDURE — 97530 THERAPEUTIC ACTIVITIES: CPT

## 2023-04-19 PROCEDURE — 25010000002 ONDANSETRON PER 1 MG

## 2023-04-19 PROCEDURE — 99232 SBSQ HOSP IP/OBS MODERATE 35: CPT | Performed by: HOSPITALIST

## 2023-04-19 PROCEDURE — 63710000001 INSULIN DETEMIR PER 5 UNITS: Performed by: HOSPITALIST

## 2023-04-19 PROCEDURE — 63710000001 INSULIN LISPRO (HUMAN) PER 5 UNITS: Performed by: INTERNAL MEDICINE

## 2023-04-19 PROCEDURE — 94660 CPAP INITIATION&MGMT: CPT

## 2023-04-19 PROCEDURE — C1729 CATH, DRAINAGE: HCPCS

## 2023-04-19 PROCEDURE — 97166 OT EVAL MOD COMPLEX 45 MIN: CPT

## 2023-04-19 PROCEDURE — 87205 SMEAR GRAM STAIN: CPT | Performed by: HOSPITALIST

## 2023-04-19 PROCEDURE — 85027 COMPLETE CBC AUTOMATED: CPT | Performed by: HOSPITALIST

## 2023-04-19 PROCEDURE — 76942 ECHO GUIDE FOR BIOPSY: CPT

## 2023-04-19 PROCEDURE — 99231 SBSQ HOSP IP/OBS SF/LOW 25: CPT

## 2023-04-19 PROCEDURE — 89051 BODY FLUID CELL COUNT: CPT | Performed by: HOSPITALIST

## 2023-04-19 PROCEDURE — 87070 CULTURE OTHR SPECIMN AEROBIC: CPT | Performed by: HOSPITALIST

## 2023-04-19 PROCEDURE — 97535 SELF CARE MNGMENT TRAINING: CPT

## 2023-04-19 PROCEDURE — 97162 PT EVAL MOD COMPLEX 30 MIN: CPT

## 2023-04-19 PROCEDURE — 93306 TTE W/DOPPLER COMPLETE: CPT

## 2023-04-19 PROCEDURE — 80053 COMPREHEN METABOLIC PANEL: CPT | Performed by: HOSPITALIST

## 2023-04-19 PROCEDURE — 93306 TTE W/DOPPLER COMPLETE: CPT | Performed by: INTERNAL MEDICINE

## 2023-04-19 PROCEDURE — 99232 SBSQ HOSP IP/OBS MODERATE 35: CPT | Performed by: INTERNAL MEDICINE

## 2023-04-19 PROCEDURE — 25010000002 PIPERACILLIN SOD-TAZOBACTAM PER 1 G: Performed by: HOSPITALIST

## 2023-04-19 PROCEDURE — 0 LIDOCAINE 1 % SOLUTION: Performed by: HOSPITALIST

## 2023-04-19 PROCEDURE — 87075 CULTR BACTERIA EXCEPT BLOOD: CPT | Performed by: HOSPITALIST

## 2023-04-19 PROCEDURE — 93922 UPR/L XTREMITY ART 2 LEVELS: CPT

## 2023-04-19 PROCEDURE — 25010000002 DAPTOMYCIN PER 1 MG: Performed by: INTERNAL MEDICINE

## 2023-04-19 PROCEDURE — 82962 GLUCOSE BLOOD TEST: CPT

## 2023-04-19 PROCEDURE — 84157 ASSAY OF PROTEIN OTHER: CPT | Performed by: HOSPITALIST

## 2023-04-19 PROCEDURE — 83615 LACTATE (LD) (LDH) ENZYME: CPT | Performed by: HOSPITALIST

## 2023-04-19 PROCEDURE — 93922 UPR/L XTREMITY ART 2 LEVELS: CPT | Performed by: INTERNAL MEDICINE

## 2023-04-19 PROCEDURE — 85610 PROTHROMBIN TIME: CPT | Performed by: RADIOLOGY

## 2023-04-19 PROCEDURE — 0W993ZX DRAINAGE OF RIGHT PLEURAL CAVITY, PERCUTANEOUS APPROACH, DIAGNOSTIC: ICD-10-PCS | Performed by: HOSPITALIST

## 2023-04-19 PROCEDURE — 97597 DBRDMT OPN WND 1ST 20 CM/<: CPT

## 2023-04-19 RX ORDER — BUMETANIDE 0.25 MG/ML
1 INJECTION INTRAMUSCULAR; INTRAVENOUS EVERY 12 HOURS
Status: DISCONTINUED | OUTPATIENT
Start: 2023-04-19 | End: 2023-04-24

## 2023-04-19 RX ORDER — ONDANSETRON 2 MG/ML
INJECTION INTRAMUSCULAR; INTRAVENOUS
Status: COMPLETED
Start: 2023-04-19 | End: 2023-04-19

## 2023-04-19 RX ORDER — LOSARTAN POTASSIUM 50 MG/1
50 TABLET ORAL
Status: DISCONTINUED | OUTPATIENT
Start: 2023-04-19 | End: 2023-04-28 | Stop reason: HOSPADM

## 2023-04-19 RX ORDER — BUMETANIDE 0.25 MG/ML
1 INJECTION INTRAMUSCULAR; INTRAVENOUS ONCE
Status: COMPLETED | OUTPATIENT
Start: 2023-04-19 | End: 2023-04-19

## 2023-04-19 RX ORDER — LIDOCAINE HYDROCHLORIDE 10 MG/ML
6 INJECTION, SOLUTION INFILTRATION; PERINEURAL ONCE
Status: COMPLETED | OUTPATIENT
Start: 2023-04-19 | End: 2023-04-19

## 2023-04-19 RX ORDER — FAMOTIDINE 20 MG/1
20 TABLET, FILM COATED ORAL DAILY
Status: DISCONTINUED | OUTPATIENT
Start: 2023-04-20 | End: 2023-04-28 | Stop reason: HOSPADM

## 2023-04-19 RX ORDER — METOPROLOL SUCCINATE 25 MG/1
12.5 TABLET, EXTENDED RELEASE ORAL
Status: DISCONTINUED | OUTPATIENT
Start: 2023-04-19 | End: 2023-04-19

## 2023-04-19 RX ORDER — METOPROLOL SUCCINATE 25 MG/1
25 TABLET, EXTENDED RELEASE ORAL
Status: DISCONTINUED | OUTPATIENT
Start: 2023-04-19 | End: 2023-04-28 | Stop reason: HOSPADM

## 2023-04-19 RX ORDER — ONDANSETRON 2 MG/ML
4 INJECTION INTRAMUSCULAR; INTRAVENOUS EVERY 6 HOURS PRN
Status: DISCONTINUED | OUTPATIENT
Start: 2023-04-19 | End: 2023-04-28 | Stop reason: HOSPADM

## 2023-04-19 RX ADMIN — INSULIN LISPRO 4 UNITS: 100 INJECTION, SOLUTION INTRAVENOUS; SUBCUTANEOUS at 17:21

## 2023-04-19 RX ADMIN — INSULIN LISPRO 3 UNITS: 100 INJECTION, SOLUTION INTRAVENOUS; SUBCUTANEOUS at 09:25

## 2023-04-19 RX ADMIN — LOSARTAN POTASSIUM 50 MG: 50 TABLET, FILM COATED ORAL at 10:23

## 2023-04-19 RX ADMIN — SERTRALINE HYDROCHLORIDE 50 MG: 50 TABLET ORAL at 10:23

## 2023-04-19 RX ADMIN — BUMETANIDE 1 MG: 0.25 INJECTION INTRAMUSCULAR; INTRAVENOUS at 17:20

## 2023-04-19 RX ADMIN — BUMETANIDE 1 MG: 0.25 INJECTION, SOLUTION INTRAMUSCULAR; INTRAVENOUS at 10:27

## 2023-04-19 RX ADMIN — METOPROLOL SUCCINATE 25 MG: 25 TABLET, EXTENDED RELEASE ORAL at 10:23

## 2023-04-19 RX ADMIN — ATORVASTATIN CALCIUM 40 MG: 40 TABLET, FILM COATED ORAL at 20:42

## 2023-04-19 RX ADMIN — TAZOBACTAM SODIUM AND PIPERACILLIN SODIUM 3.38 G: 375; 3 INJECTION, SOLUTION INTRAVENOUS at 23:31

## 2023-04-19 RX ADMIN — DAPTOMYCIN 300 MG: 500 INJECTION, POWDER, LYOPHILIZED, FOR SOLUTION INTRAVENOUS at 17:22

## 2023-04-19 RX ADMIN — ONDANSETRON 4 MG: 2 INJECTION INTRAMUSCULAR; INTRAVENOUS at 12:37

## 2023-04-19 RX ADMIN — TAZOBACTAM SODIUM AND PIPERACILLIN SODIUM 3.38 G: 375; 3 INJECTION, SOLUTION INTRAVENOUS at 17:22

## 2023-04-19 RX ADMIN — INSULIN DETEMIR 5 UNITS: 100 INJECTION, SOLUTION SUBCUTANEOUS at 09:26

## 2023-04-19 RX ADMIN — LIDOCAINE HYDROCHLORIDE 6 ML: 10 INJECTION, SOLUTION INFILTRATION; PERINEURAL at 12:20

## 2023-04-19 RX ADMIN — BUMETANIDE 1 MG: 0.25 INJECTION, SOLUTION INTRAMUSCULAR; INTRAVENOUS at 20:42

## 2023-04-19 NOTE — THERAPY EVALUATION
Patient Name: Susan Anderson  : 1940    MRN: [id number]                              Today's Date: 2023       Admit Date: 2023    Visit Dx:     ICD-10-CM ICD-9-CM   1. Cellulitis of right foot due to methicillin-resistant Staphylococcus aureus  L03.115 682.7    B95.62 041.12   2. Diabetic ulcer of heel associated with type 2 diabetes mellitus, unspecified laterality, unspecified ulcer stage  E11.621 250.80    L97.409 707.14   3. Stage 3b chronic kidney disease  N18.32 585.3   4. Diabetes mellitus type 2 in obese  E11.69 250.00    E66.9 278.00   5. Elevated troponin  R77.8 790.6   6. Elevated blood pressure reading with diagnosis of hypertension  I10 401.9     Patient Active Problem List   Diagnosis   • Diabetic foot ulcer   • PVD (peripheral vascular disease) ([facility])   • Osteomyelitis   • Diabetes mellitus with neuropathy   • Essential hypertension   • Stage 3a chronic kidney disease   • Acute on chronic systolic CHF (congestive heart failure)   • Mitral valve disease   • NICM (nonischemic cardiomyopathy) ([facility])   • CAD   • Dyslipidemia   • Chronic combined systolic and diastolic heart failure    • Lumbar stenosis with neurogenic claudication   • Spondylolisthesis, lumbar region   • Gait disturbance   • Anemia   • Sleep apnea   • Paroxysmal atrial fibrillation   • T2DM    • Iron deficiency anemia, unspecified   • Cellulitis of right foot due to methicillin-resistant Staphylococcus aureus   • Acute respiratory failure with hypoxia and hypercarbia     Past Medical History:   Diagnosis Date   • Anemia    • Anxiety    • Arthritis    • Asthma  - double pneumonia    Currently on inhaler and nebulizer   • Atrial fibrillation 2022   • Brain tumor     Benign and followed at    • Cancer     cervical cancer, skin cancer   • CHF (congestive heart failure) 2021   • Chronic kidney disease Related to diabetes   • Clotting disorder 10/22    Upper GI bleed from blood thinners aggravate ulcers   •  Per Eboni WALTER in [place] 09/01/2022: \"Spoke with patient in regards of Duloxentine 30mg: states she's having  No appetite and diarrhea, hot flashes, and nauseas. \"  Pt has OV with Dr. Najma Delacruz on 09/22/2022 - can discuss Cymbalta RX at that time. Coronary artery disease 6/4/2021 DX for hear failure   • COVID-19 01/28/2023   • Depression 8/22   • Diabetes mellitus 30 years    Seeing Dr. Lam 1st time Aug 19   • Dizziness 07/27/2022   • Effusion of right knee 08/12/2022   • Fall 07/27/2022   • GERD (gastroesophageal reflux disease)    • Gout    • History of medical problems 8/22    AFIB   • History of staph infection 10/2021    right toe   • History of transfusion     no reactions, 1 unit, [facility]   • HL (hearing loss)     Acoustic neuroma right   • Hx of colonoscopy    • Hyperlipidemia Reference current labs x 2-3yrs approx   • Hypertension 30 years   • Hypomagnesemia 10/06/2022   • Insomnia    • Low back pain    • Migraine    • Mitral valve disease    • Mitral valve disease    • Osteomyelitis    • Peptic ulceration 10/22    Secondary to blood thinners   • Peripheral neuropathy    • Physical deconditioning 05/17/2022   • Pneumonia due to infectious organism 06/04/2021   • Pressure injury of skin of sacral region 08/21/2022   • Renal insufficiency 2021   • Sepsis 01/28/2023   • Sleep apnea    • Syncope, unspecified syncope type 10/06/2022   • Type 2 diabetes mellitus     30 years   • Weakness 07/27/2022     Past Surgical History:   Procedure Laterality Date   • ABDOMINAL HYSTERECTOMY W/SALPINGECTOMY     • AMPUTATION  Right great toe, 1st 1/3 metatarsal -Reg 4/14/21   • AORTAGRAM N/A 04/09/2021    Procedure: AORTAGRAM WITH OR WITHOUT RUNOFFS, WITH Co2;  Surgeon: Trey Forrest MD;  Location: [facility];  Service: Vascular;  Laterality: N/A;   • AORTAGRAM N/A 09/28/2022    Procedure: CO2 ANGIOGRAM, LEFT SFA ANGIOPLASTY WITH DRUG ELUTING BALLOON, LEFT SFA STENT PLACEMENT ;  Surgeon: Trey Forrest MD;  Location: [facility];  Service: Vascular;  Laterality: N/A;  FLUORO: 13.12  DOSE 162mGy  CONTRAST: Isovue 350: 15ml   • APPENDECTOMY     • BRAIN TUMOR EXCISION      laser surgery    • CARDIAC CATHETERIZATION N/A 06/21/2021    Procedure: LEFT HEART  CATH;  Surgeon: Anjum Ceballos MD;  Location:  [facility];  Service: Cardiology;  Laterality: N/A;   • CATARACT EXTRACTION W/ INTRAOCULAR LENS  IMPLANT, BILATERAL     • CHOLECYSTECTOMY     • COLONOSCOPY     • ENDOSCOPY N/A 10/07/2022    Procedure: ESOPHAGOGASTRODUODENOSCOPY;  Surgeon: Stef Veras MD;  Location:  [facility];  Service: Gastroenterology;  Laterality: N/A;   • EYE SURGERY     • FEMORAL ARTERY STENT  10/2022   • HYSTERECTOMY     • INTERVENTIONAL RADIOLOGY PROCEDURE N/A 1/15/2023    Procedure: CV INTERVENTIONAL RADIOLOGY PROCEDURE;  Surgeon: Sanket Zapata MD;  Location: [email or web address] CATH INVASIVE LOCATION;  Service: Interventional Radiology;  Laterality: N/A;   • TOE SURGERY     • TRANS METATARSAL AMPUTATION Right 04/14/2021    Procedure: AMPUTATION TRANS METATARSAL RIGHT GREAT TOE;  Surgeon: Valdez Finney MD;  Location:  [facility];  Service: Vascular;  Laterality: Right;      General Information     Row Name 04/19/23 1122          Physical Therapy Time and Intention    Document Type evaluation  -     Mode of Treatment physical therapy  -     Row Name 04/19/23 1122          General Information    Patient Profile Reviewed yes  -     Prior Level of Function min assist:;transfer;bed mobility;mod assist:;w/c or scooter  does not ambulate at baseline, transfers to w/c  -     Existing Precautions/Restrictions fall;oxygen therapy device and L/min;other (see comments)  B heel wounds  -     Barriers to Rehab medically complex  -     Row Name 04/19/23 1122          Living Environment    People in Home alone  24/7 caregiver support  -     Row Name 04/19/23 1122          Home Main Entrance    Number of Stairs, Main Entrance none;other (see comments)  ramp  -     Row Name 04/19/23 1122          Stairs Within Home, Primary    Number of Stairs, Within Home, Primary none  -     Row Name 04/19/23 1122          Cognition    Orientation Status (Cognition) oriented x 3  -HM     Row  Name 04/19/23 1122          Safety Issues, Functional Mobility    Safety Issues Affecting Function (Mobility) awareness of need for assistance;friction/shear risk;insight into deficits/self-awareness;safety precaution awareness;safety precautions follow-through/compliance;sequencing abilities  -     Impairments Affecting Function (Mobility) balance;endurance/activity tolerance;pain;strength;shortness of breath  -           User Key  (r) = Recorded By, (t) = Taken By, (c) = Cosigned By    Initials Name Provider Type     Anna White PT Physical Therapist               Mobility     Row Name 04/19/23 1124          Bed Mobility    Bed Mobility rolling right;rolling left;supine-sit;sit-supine;scooting/bridging  -     Rolling Left [place] (Bed Mobility) verbal cues;moderate assist (50% patient effort)  -     Rolling Right [place] (Bed Mobility) verbal cues;moderate assist (50% patient effort)  -     Scooting/Bridging [place] (Bed Mobility) dependent (less than 25% patient effort);verbal cues;2 person assist  -     Supine-Sit [place] (Bed Mobility) maximum assist (25% patient effort);verbal cues  -     Sit-Supine [place] (Bed Mobility) maximum assist (25% patient effort);verbal cues  -     Assistive Device (Bed Mobility) bed rails;head of bed elevated  -     Comment, (Bed Mobility) cues for PLB throughout, Mobility limited d/t SOA and SpO2 drop to 85% sitting EOB.  -           User Key  (r) = Recorded By, (t) = Taken By, (c) = Cosigned By    Initials Name Provider Type     Anna White PT Physical Therapist               Obj/Interventions     Row Name 04/19/23 1125          Range of Motion Comprehensive    General Range of Motion bilateral lower extremity ROM WFL  -     Row Name 04/19/23 1125          Strength Comprehensive (MMT)    General Manual Muscle Testing (MMT) Assessment lower extremity strength deficits identified  -     Comment, General Manual Muscle  Testing (MMT) Assessment BLE grossly 4-/5  -     Row Name 04/19/23 1125          Balance    Balance Assessment sitting static balance;sitting dynamic balance  -     Static Sitting Balance contact guard;verbal cues  -     Dynamic Sitting Balance contact guard;verbal cues  -     Position, Sitting Balance supported;sitting edge of bed  -HM           User Key  (r) = Recorded By, (t) = Taken By, (c) = Cosigned By    Initials Name Provider Type     Anna White, PT Physical Therapist               Goals/Plan     Row Name 04/19/23 1156          Bed Mobility Goal 1 (PT)    Activity/Assistive Device (Bed Mobility Goal 1, PT) bed mobility activities, all  -HM     [place] Level/Cues Needed (Bed Mobility Goal 1, PT) minimum assist (75% or more patient effort)  -     Time Frame (Bed Mobility Goal 1, PT) long term goal (LTG);10 days  -HM     Progress/Outcomes (Bed Mobility Goal 1, PT) new goal  -     Row Name 04/19/23 1156          Transfer Goal 1 (PT)    Activity/Assistive Device (Transfer Goal 1, PT) transfers, all  -HM     [place] Level/Cues Needed (Transfer Goal 1, PT) minimum assist (75% or more patient effort)  -     Time Frame (Transfer Goal 1, PT) long term goal (LTG);10 days  -HM     Progress/Outcome (Transfer Goal 1, PT) new goal  -     Row Name 04/19/23 0246          Problem Specific Goal 1 (PT)    Problem Specific Goal 1 (PT) Pt will propel w/c 150 feet with Mod A x1 and complete 2 180 degree turns  -     Time Frame (Problem Specific Goal 1, PT) long-term goal (LTG);2 weeks  -     Progress/Outcome (Problem Specific Goal 1, PT) new goal  -     Row Name 04/19/23 0605          Therapy Assessment/Plan (PT)    Planned Therapy Interventions (PT) balance training;bed mobility training;home exercise program;neuromuscular re-education;ROM (range of motion);strengthening;postural re-education;patient/family education;transfer training;wheelchair management/propulsion training  -            User Key  (r) = Recorded By, (t) = Taken By, (c) = Cosigned By    Initials Name Provider Type     Anna White, PT Physical Therapist               Clinical Impression     Row Name 04/19/23 1126          Pain    Pain Intervention(s) Repositioned  -     Additional Documentation Pain Scale: FACES Pre/Post-Treatment (Group)  -     Row Name 04/19/23 1126          Pain Scale: FACES Pre/Post-Treatment    Pain: FACES Scale, Pretreatment 2-->hurts little bit  -     Posttreatment Pain Rating 4-->hurts little more  -     Pain Location - Side/Orientation Bilateral  -     Pre/Posttreatment Pain Comment BLE heel pain  -     Row Name 04/19/23 1126          Plan of Care Review    Plan of Care Reviewed With patient  -     Progress no change  -     Outcome Evaluation PT eval completed. Pt completed bed mobility Mod A- Dependent x2 with frequent cues for PLB and sequencing thoughout. Mobility limited d/t SOA and SpO2 dropped to 85% with rest and cueing for PLB to increase above 89%. d/c rec SNF.  -     Row Name 04/19/23 1126          Therapy Assessment/Plan (PT)    Rehab Potential (PT) good, to achieve stated therapy goals  -     Criteria for Skilled Interventions Met (PT) yes;meets criteria;skilled treatment is necessary  -     Therapy Frequency (PT) daily  -     Row Name 04/19/23 1126          Vital Signs    Pre Systolic BP Rehab 144  -HM     Pre Treatment Diastolic BP 86  -HM     Pretreatment Heart Rate (beats/min) 78  -HM     Pre SpO2 (%) 97  -HM     O2 Delivery Pre Treatment nasal cannula  -HM     Intra SpO2 (%) 85  -HM     O2 Delivery Intra Treatment nasal cannula  -HM     Post SpO2 (%) 92  -HM     O2 Delivery Post Treatment nasal cannula  -HM     Pre Patient Position Supine  -HM     Intra Patient Position Sitting  -HM     Post Patient Position Supine  -     Row Name 04/19/23 1126          Positioning and Restraints    Pre-Treatment Position in bed  -     Post Treatment Position bed  -HM     In  Bed exit alarm on;encouraged to call for assist;call light within reach;notified nsg;with family/caregiver;fowlers;side rails up x3  -           User Key  (r) = Recorded By, (t) = Taken By, (c) = Cosigned By    Initials Name Provider Type    Anna Vergara, PT Physical Therapist               Outcome Measures     Row Name 04/19/23 1159 04/19/23 0800       How much help from another person do you currently need...    Turning from your back to your side while in flat bed without using bedrails? 2  - 3  -KR    Moving from lying on back to sitting on the side of a flat bed without bedrails? 2  - 3  -KR    Moving to and from a bed to a chair (including a wheelchair)? 2  - 2  -KR    Standing up from a chair using your arms (e.g., wheelchair, bedside chair)? 2  - 2  -KR    Climbing 3-5 steps with a railing? 1  - 1  -KR    To walk in hospital room? 1  - 1  -KR    AM-PAC 6 Clicks Score (PT) 10  - 12  -KR    Highest level of mobility 4 --> Transferred to chair/commode  - 4 --> Transferred to chair/commode  -KR    Row Name 04/19/23 1159 04/19/23 1116       Functional Assessment    Outcome Measure Options AM-PAC 6 Clicks Basic Mobility (PT)  - AM-PAC 6 Clicks Daily Activity (OT)  -          User Key  (r) = Recorded By, (t) = Taken By, (c) = Cosigned By    Initials Name Provider Type    Cori Dee OT Occupational Therapist     Anna White, PT Physical Therapist    Cha Salinas, RN Registered Nurse                             Physical Therapy Education     Title: PT OT SLP Therapies (In Progress)     Topic: Physical Therapy (In Progress)     Point: Mobility training (In Progress)     Learning Progress Summary           Patient Acceptance, E,TB, NR by  at 4/19/2023 1159    Acceptance, E, NR by  at 4/18/2023 0824   Family Acceptance, E, VU by JOJO at 4/19/2023 0818                   Point: Home exercise program (In Progress)     Learning Progress Summary           Patient Acceptance, E,  NR by KR at 4/18/2023 0824   Family Acceptance, E, VU by KR at 4/19/2023 0818                   Point: Body mechanics (In Progress)     Learning Progress Summary           Patient Acceptance, E,TB, NR by  at 4/19/2023 1159    Acceptance, E, NR by KR at 4/18/2023 0824   Family Acceptance, E, VU by KR at 4/19/2023 0818                   Point: Precautions (In Progress)     Learning Progress Summary           Patient Acceptance, E,TB, NR by  at 4/19/2023 1159    Acceptance, E, NR by KR at 4/18/2023 0824   Family Acceptance, E, VU by KR at 4/19/2023 0818                               User Key     Initials Effective Dates Name Provider Type Discipline     09/22/22 -  Anna White, PT Physical Therapist PT     01/16/23 -  Cha Jefferson, RN Registered Nurse Nurse              PT Recommendation and Plan  Planned Therapy Interventions (PT): balance training, bed mobility training, home exercise program, neuromuscular re-education, ROM (range of motion), strengthening, postural re-education, patient/family education, transfer training, wheelchair management/propulsion training  Plan of Care Reviewed With: patient  Progress: no change  Outcome Evaluation: PT eval completed. Pt completed bed mobility Mod A- Dependent x2 with frequent cues for PLB and sequencing thoughout. Mobility limited d/t SOA and SpO2 dropped to 85% with rest and cueing for PLB to increase above 89%. d/c rec SNF.     Time Calculation:    PT Charges     Row Name 04/19/23 1412 04/19/23 1200          Time Calculation    Start Time 0925  - 0815  -     PT Received On -- 04/19/23  -     PT Goal Re-Cert Due Date 04/29/23  - 04/29/23  -        Timed Charges    98614 - PT Therapeutic Activity Minutes -- 8  -        Untimed Charges    PT Eval/Re-eval Minutes -- 50  -     Wound Care 77347 Selective debridement  - --     97716-Wvaindwrn debridement 35  -LH --        Total Minutes    Timed Charges Total Minutes -- 8  -HM     Untimed Charges  Total Minutes 35  -LH 50  -HM      Total Minutes 35  -LH 58  -HM           User Key  (r) = Recorded By, (t) = Taken By, (c) = Cosigned By    Initials Name Provider Type     López Graham, PT Physical Therapist     Anna White, SAHIL Physical Therapist              Therapy Charges for Today     Code Description Service Date Service Provider Modifiers Qty    30724780452 HC PT THERAPEUTIC ACT EA 15 MIN 4/19/2023 Anna White, PT GP 1    21423410013 HC PT EVAL MOD COMPLEXITY 4 4/19/2023 Anna White, PT GP 1          PT G-Codes  Outcome Measure Options: AM-PAC 6 Clicks Basic Mobility (PT)  AM-PAC 6 Clicks Score (PT): 10  AM-PAC 6 Clicks Score (OT): 12  PT Discharge Summary  Anticipated Discharge Disposition (PT): skilled nursing facility    Anna White PT  4/19/2023

## 2023-04-19 NOTE — THERAPY EVALUATION
Patient Name: Susan Anderson  : 1940    MRN: [id number]                              Today's Date: 2023       Admit Date: 2023    Visit Dx:     ICD-10-CM ICD-9-CM   1. Cellulitis of right foot due to methicillin-resistant Staphylococcus aureus  L03.115 682.7    B95.62 041.12   2. Diabetic ulcer of heel associated with type 2 diabetes mellitus, unspecified laterality, unspecified ulcer stage  E11.621 250.80    L97.409 707.14   3. Stage 3b chronic kidney disease  N18.32 585.3   4. Diabetes mellitus type 2 in obese  E11.69 250.00    E66.9 278.00   5. Elevated troponin  R77.8 790.6   6. Elevated blood pressure reading with diagnosis of hypertension  I10 401.9     Patient Active Problem List   Diagnosis   • Diabetic foot ulcer   • PVD (peripheral vascular disease) ([facility])   • Osteomyelitis   • Diabetes mellitus with neuropathy   • Essential hypertension   • Stage 3a chronic kidney disease   • Acute on chronic systolic CHF (congestive heart failure)   • Mitral valve disease   • NICM (nonischemic cardiomyopathy) ([facility])   • CAD   • Dyslipidemia   • Chronic combined systolic and diastolic heart failure    • Lumbar stenosis with neurogenic claudication   • Spondylolisthesis, lumbar region   • Gait disturbance   • Anemia   • Sleep apnea   • Paroxysmal atrial fibrillation   • T2DM    • Iron deficiency anemia, unspecified   • Cellulitis of right foot due to methicillin-resistant Staphylococcus aureus   • Acute respiratory failure with hypoxia and hypercarbia     Past Medical History:   Diagnosis Date   • Anemia    • Anxiety    • Arthritis    • Asthma  - double pneumonia    Currently on inhaler and nebulizer   • Atrial fibrillation 2022   • Brain tumor     Benign and followed at    • Cancer     cervical cancer, skin cancer   • CHF (congestive heart failure) 2021   • Chronic kidney disease Related to diabetes   • Clotting disorder 10/22    Upper GI bleed from blood thinners aggravate ulcers   •  Coronary artery disease 6/4/2021 DX for hear failure   • COVID-19 01/28/2023   • Depression 8/22   • Diabetes mellitus 30 years    Seeing Dr. Lam 1st time Aug 19   • Dizziness 07/27/2022   • Effusion of right knee 08/12/2022   • Fall 07/27/2022   • GERD (gastroesophageal reflux disease)    • Gout    • History of medical problems 8/22    AFIB   • History of staph infection 10/2021    right toe   • History of transfusion     no reactions, 1 unit, [facility]   • HL (hearing loss)     Acoustic neuroma right   • Hx of colonoscopy    • Hyperlipidemia Reference current labs x 2-3yrs approx   • Hypertension 30 years   • Hypomagnesemia 10/06/2022   • Insomnia    • Low back pain    • Migraine    • Mitral valve disease    • Mitral valve disease    • Osteomyelitis    • Peptic ulceration 10/22    Secondary to blood thinners   • Peripheral neuropathy    • Physical deconditioning 05/17/2022   • Pneumonia due to infectious organism 06/04/2021   • Pressure injury of skin of sacral region 08/21/2022   • Renal insufficiency 2021   • Sepsis 01/28/2023   • Sleep apnea    • Syncope, unspecified syncope type 10/06/2022   • Type 2 diabetes mellitus     30 years   • Weakness 07/27/2022     Past Surgical History:   Procedure Laterality Date   • ABDOMINAL HYSTERECTOMY W/SALPINGECTOMY     • AMPUTATION  Right great toe, 1st 1/3 metatarsal -Reg 4/14/21   • AORTAGRAM N/A 04/09/2021    Procedure: AORTAGRAM WITH OR WITHOUT RUNOFFS, WITH Co2;  Surgeon: Trey Forrest MD;  Location: [facility];  Service: Vascular;  Laterality: N/A;   • AORTAGRAM N/A 09/28/2022    Procedure: CO2 ANGIOGRAM, LEFT SFA ANGIOPLASTY WITH DRUG ELUTING BALLOON, LEFT SFA STENT PLACEMENT ;  Surgeon: Trey Forrest MD;  Location: [facility];  Service: Vascular;  Laterality: N/A;  FLUORO: 13.12  DOSE 162mGy  CONTRAST: Isovue 350: 15ml   • APPENDECTOMY     • BRAIN TUMOR EXCISION      laser surgery    • CARDIAC CATHETERIZATION N/A 06/21/2021    Procedure: LEFT HEART  CATH;  Surgeon: Anjum Ceballos MD;  Location:  [facility] CATH INVASIVE [facility]ATION;  Service: Cardiology;  Laterality: N/A;   • CATARACT EXTRACTION W/ INTRAOCULAR LENS  IMPLANT, BILATERAL     • CHOLECYSTECTOMY     • COLONOSCOPY     • ENDOSCOPY N/A 10/07/2022    Procedure: ESOPHAGOGASTRODUODENOSCOPY;  Surgeon: Stef Veras MD;  Location:  [facility];  Service: Gastroenterology;  Laterality: N/A;   • EYE SURGERY     • FEMORAL ARTERY STENT  10/2022   • HYSTERECTOMY     • INTERVENTIONAL RADIOLOGY PROCEDURE N/A 1/15/2023    Procedure: CV INTERVENTIONAL RADIOLOGY PROCEDURE;  Surgeon: Sanket Zapata MD;  Location: [facility];  Service: Interventional Radiology;  Laterality: N/A;   • TOE SURGERY     • TRANS METATARSAL AMPUTATION Right 04/14/2021    Procedure: AMPUTATION TRANS METATARSAL RIGHT GREAT TOE;  Surgeon: Valdez Finney MD;  Location:  [facility] OR;  Service: Vascular;  Laterality: Right;      General Information     Row Name 04/19/23 0808          OT Time and Intention    Document Type evaluation  -SW     Mode of Treatment occupational therapy  -     Row Name 04/19/23 08          General Information    Patient Profile Reviewed yes  -SW     Prior Level of Function independent:;feeding;grooming;transfer;mod assist:;dressing;max assist:;bathing  -SW     Existing Precautions/Restrictions fall;oxygen therapy device and L/min;other (see comments)  B heel wounds  -     Barriers to Rehab medically complex;previous functional deficit  -     Row Name 04/19/23 0808          Occupational Profile    Environmental Supports and Barriers (Occupational Profile) Has 24/7 caregiver suport.  She has a hospital bed, w/c, and a walk in shower with bench.  -     Row Name 04/19/23 0808          Living Environment    People in Home alone;other (see comments)  24/7 caregiver support  -     Row Name 04/19/23 0808          Home Main Entrance    Number of Stairs, Main Entrance none  ramp  -     Row Name 04/19/23  0808          Stairs Within Home, Primary    Number of Stairs, Within Home, Primary none  -     Row Name 04/19/23 0808          Cognition    Orientation Status (Cognition) oriented x 3  -     Row Name 04/19/23 0808          Safety Issues, Functional Mobility    Safety Issues Affecting Function (Mobility) friction/shear risk;awareness of need for assistance;insight into deficits/self-awareness;safety precaution awareness;safety precautions follow-through/compliance;sequencing abilities  -     Impairments Affecting Function (Mobility) balance;endurance/activity tolerance;pain;strength;shortness of breath  -           User Key  (r) = Recorded By, (t) = Taken By, (c) = Cosigned By    Initials Name Provider Type    Cori Dee OT Occupational Therapist                 Mobility/ADL's     Row Name 04/19/23 0808          Bed Mobility    Bed Mobility rolling right;rolling left;supine-sit;sit-supine;scooting/bridging  -SW     Rolling Left [place] (Bed Mobility) verbal cues;moderate assist (50% patient effort)  -SW     Rolling Right [place] (Bed Mobility) verbal cues;moderate assist (50% patient effort)  -SW     Scooting/Bridging [place] (Bed Mobility) dependent (less than 25% patient effort);verbal cues  -SW     Supine-Sit [place] (Bed Mobility) maximum assist (25% patient effort);verbal cues  -SW     Sit-Supine [place] (Bed Mobility) maximum assist (25% patient effort);verbal cues  -SW     Assistive Device (Bed Mobility) bed rails;head of bed elevated  -     Row Name 04/19/23 0808          Transfers    Comment, (Transfers) Deferred d/t soa  -     Row Name 04/19/23 0808          Activities of Daily Living    BADL Assessment/Intervention lower body dressing  -     Row Name 04/19/23 0808          Lower Body Dressing Assessment/Training    [place] Level (Lower Body Dressing) lower body dressing skills;dependent (less than 25% patient effort)  -     Position (Lower Body Dressing)  supine  -           User Key  (r) = Recorded By, (t) = Taken By, (c) = Cosigned By    Initials Name Provider Type    Cori Dee OT Occupational Therapist               Obj/Interventions     Row Name 04/19/23 0808          Sensory Assessment (Somatosensory)    Sensory Assessment (Somatosensory) UE sensation intact  -SW     Row Name 04/19/23 0808          Vision Assessment/Intervention    Visual Impairment/Limitations WFL  -[facility] Name 04/19/23 0808          Range of Motion Comprehensive    General Range of Motion bilateral upper extremity ROM WFL  -SW     Row Name 04/19/23 0808          Strength Comprehensive (MMT)    General Manual Muscle Testing (MMT) Assessment upper extremity strength deficits identified  -     Comment, General Manual Muscle Testing (MMT) Assessment BUEs 4-/5  -SW     Row Name 04/19/23 0808          Balance    Balance Assessment sitting static balance;sitting dynamic balance  -     Static Sitting Balance contact guard;verbal cues  -     Dynamic Sitting Balance contact guard;verbal cues  -SW     Position, Sitting Balance supported  -     Balance Interventions sitting;sit to stand;standing;supported;static;dynamic  -           User Key  (r) = Recorded By, (t) = Taken By, (c) = Cosigned By    Initials Name Provider Type    Cori Dee OT Occupational Therapist               Goals/Plan     Row Name 04/19/23 0808          Bed Mobility Goal 1 (OT)    Activity/Assistive Device (Bed Mobility Goal 1, OT) sit to supine  -SW     [place] Level/Cues Needed (Bed Mobility Goal 1, OT) moderate assist (50-74% patient effort)  -SW     Time Frame (Bed Mobility Goal 1, OT) long term goal (LTG);by discharge  -     Progress/Outcomes (Bed Mobility Goal 1, OT) goal ongoing  -SW     Row Name 04/19/23 0808          Toileting Goal 1 (OT)    Activity/Device (Toileting Goal 1, OT) toileting skills, all  -SW     [place] Level/Cues Needed (Toileting Goal 1, OT) moderate assist (50-74%  patient effort)  -     Time Frame (Toileting Goal 1, OT) long term goal (LTG);by discharge  -     Progress/Outcome (Toileting Goal 1, OT) goal ongoing  -SW     Row Name 04/19/23 0808          Therapy Assessment/Plan (OT)    Planned Therapy Interventions (OT) activity tolerance training;adaptive equipment training;BADL retraining;functional balance retraining;transfer/mobility retraining;strengthening exercise;patient/caregiver education/training  -           User Key  (r) = Recorded By, (t) = Taken By, (c) = Cosigned By    Initials Name Provider Type    Cori Dee OT Occupational Therapist               Clinical Impression     Row Name 04/19/23 0808          Pain Assessment    Pretreatment Pain Rating 7/10  -     Posttreatment Pain Rating 7/10  -     Pain Location - Side/Orientation Bilateral  -     Pain Location - extremity  -     Pain Intervention(s) Repositioned  -SW     Row Name 04/19/23 0808          Plan of Care Review    Plan of Care Reviewed With patient;caregiver  -     Outcome Evaluation OT evaluation complete.  Pt noted with a decline in mob, self care, strength, endurance and an increase of soa from baseline.  Pt mod to max assist for bed mob, u/a to stand, O2 drop to 85% on nasal cannula with activity.  Recommend IPOT and SNF for rehab at d/c.  -[facility] 04/19/23 0808          Therapy Assessment/Plan (OT)    Rehab Potential (OT) good, to achieve stated therapy goals  -     Criteria for Skilled Therapeutic Interventions Met (OT) yes;meets criteria;skilled treatment is necessary  -     Therapy Frequency (OT) daily  -SW     Row Name 04/19/23 0808          Therapy Plan Review/Discharge Plan (OT)    Anticipated Discharge Disposition (OT) skilled nursing facility  -SW     Row Name 04/19/23 08          Vital Signs    Pre Systolic BP Rehab 144  -SW     Pre Treatment Diastolic BP 86  -SW     Pretreatment Heart Rate (beats/min) 78  -SW     Pre SpO2 (%) 97  -SW     O2 Delivery Pre  Treatment nasal cannula  -SW     O2 Delivery Intra Treatment nasal cannula  -SW     O2 Delivery Post Treatment nasal cannula  -SW     Pre Patient Position Supine  -SW     Intra Patient Position Sitting  -SW     Post Patient Position Supine  -SW     Row Name 04/19/23 0808          Positioning and Restraints    Pre-Treatment Position in bed  -SW     Post Treatment Position bed  -SW     In Bed notified nsg;supine;fowlers;call light within reach;encouraged to call for assist;exit alarm on;side rails up x3;with family/caregiver  -           User Key  (r) = Recorded By, (t) = Taken By, (c) = Cosigned By    Initials Name Provider Type    Cori Dee OT Occupational Therapist               Outcome Measures     Row Name 04/19/23 1116          How much help from another is currently needed...    Putting on and taking off regular lower body clothing? 1  -SW     Bathing (including washing, rinsing, and drying) 2  -SW     Toileting (which includes using toilet bed pan or urinal) 1  -SW     Putting on and taking off regular upper body clothing 2  -SW     Taking care of personal grooming (such as brushing teeth) 3  -SW     Eating meals 3  -SW     AM-[facility] 6 Clicks Score (OT) 12  -SW     Row Name 04/19/23 0800          How much help from another person do you currently need...    Turning from your back to your side while in flat bed without using bedrails? 3  -KR     Moving from lying on back to sitting on the side of a flat bed without bedrails? 3  -KR     Moving to and from a bed to a chair (including a wheelchair)? 2  -KR     Standing up from a chair using your arms (e.g., wheelchair, bedside chair)? 2  -KR     Climbing 3-5 steps with a railing? 1  -KR     To walk in hospital room? 1  -KR     AM-PAC 6 Clicks Score (PT) 12  -KR     Highest level of mobility 4 --> Transferred to chair/commode  -KR     Row Name 04/19/23 1116          Functional Assessment    Outcome Measure Options AM-PAC 6 Clicks Daily Activity (OT)  -            User Key  (r) = Recorded By, (t) = Taken By, (c) = Cosigned By    Initials Name Provider Type    Cori Dee OT Occupational Therapist    Cha Salinas, RN Registered Nurse                Occupational Therapy Education     Title: PT OT SLP Therapies (In Progress)     Topic: Occupational Therapy (In Progress)     Point: ADL training (In Progress)     Description:   Instruct learner(s) on proper safety adaptation and remediation techniques during self care or transfers.   Instruct in proper use of assistive devices.              Learning Progress Summary           Patient Acceptance, E, NR by JOJO at 4/18/2023 0824   Family Acceptance, E, VU by JOJO at 4/19/2023 0818                   Point: Home exercise program (In Progress)     Description:   Instruct learner(s) on appropriate technique for monitoring, assisting and/or progressing therapeutic exercises/activities.              Learning Progress Summary           Patient Acceptance, E, NR by JOJO at 4/18/2023 0824   Family Acceptance, E, VU by JOJO at 4/19/2023 0818                   Point: Precautions (In Progress)     Description:   Instruct learner(s) on prescribed precautions during self-care and functional transfers.              Learning Progress Summary           Patient Acceptance, E, NR by JOJO at 4/18/2023 0824   Family Acceptance, E, VU by JOJO at 4/19/2023 0818                   Point: Body mechanics (In Progress)     Description:   Instruct learner(s) on proper positioning and spine alignment during self-care, functional mobility activities and/or exercises.              Learning Progress Summary           Patient Acceptance, E, NR by JOJO at 4/18/2023 0824   Family Acceptance, E, VU by JOJO at 4/19/2023 0818                               User Key     Initials Effective Dates Name Provider Type Discipline    JOJO 01/16/23 -  Cha Jefferson, RN Registered Nurse Nurse              OT Recommendation and Plan  Planned Therapy Interventions (OT): activity  tolerance training, adaptive equipment training, BADL retraining, functional balance retraining, transfer/mobility retraining, strengthening exercise, patient/caregiver education/training  Therapy Frequency (OT): daily  Plan of Care Review  Plan of Care Reviewed With: patient, caregiver  Outcome Evaluation: OT evaluation complete.  Pt noted with a decline in mob, self care, strength, endurance and an increase of soa from baseline.  Pt mod to max assist for bed mob, u/a to stand, O2 drop to 85% on nasal cannula with activity.  Recommend IPOT and SNF for rehab at d/c.     Time Calculation:    Time Calculation- OT     Row Name 04/19/23 0808             Time Calculation- OT    OT Start Time 0808  -SW      OT Received On 04/19/23  -SW      OT Goal Re-Cert Due Date 04/29/23  -SW         Timed Charges    78805 - OT Self Care/Mgmt Minutes 16  -SW         Untimed Charges    OT Eval/Re-eval Minutes 50  -SW         Total Minutes    Timed Charges Total Minutes 16  -SW      Untimed Charges Total Minutes 50  -SW       Total Minutes 66  -SW            User Key  (r) = Recorded By, (t) = Taken By, (c) = Cosigned By    Initials Name Provider Type    SW Cori Pope OT Occupational Therapist              Therapy Charges for Today     Code Description Service Date Service Provider Modifiers Qty    [id number]  OT SELF CARE/MGMT/TRAIN EA 15 MIN 4/19/2023 Cori Pope OT GO 1    [id number]  OT EVAL MOD COMPLEXITY 4 4/19/2023 Cori Pope OT GO 1               Cori Pope OT  4/19/2023

## 2023-04-19 NOTE — PLAN OF CARE
Problem: Adult Inpatient Plan of Care  Goal: Plan of Care Review  Outcome: Ongoing, Progressing  Goal: Patient-Specific Goal (Individualized)  Outcome: Ongoing, Progressing  Goal: Absence of Hospital-Acquired Illness or Injury  Outcome: Ongoing, Progressing  Intervention: Identify and Manage Fall Risk  Recent Flowsheet Documentation  Taken 4/19/2023 1400 by Cha Jefferson RN  Safety Promotion/Fall Prevention: safety round/check completed  Taken 4/19/2023 1000 by Cha Jefferson RN  Safety Promotion/Fall Prevention: safety round/check completed  Taken 4/19/2023 0800 by Cha Jefferson RN  Safety Promotion/Fall Prevention: safety round/check completed  Intervention: Prevent Skin Injury  Recent Flowsheet Documentation  Taken 4/19/2023 1400 by Cha Jefferson RN  Body Position: weight shifting  Skin Protection: incontinence pads utilized  Taken 4/19/2023 1000 by Cha Jefferson RN  Body Position: weight shifting  Skin Protection: incontinence pads utilized  Taken 4/19/2023 0800 by Cha Jefferson RN  Body Position: weight shifting  Skin Protection: incontinence pads utilized  Intervention: Prevent and Manage VTE (Venous Thromboembolism) Risk  Recent Flowsheet Documentation  Taken 4/19/2023 1400 by Cha Jefferson RN  Activity Management: activity encouraged  Taken 4/19/2023 1000 by Cha Jefferson RN  Activity Management: activity encouraged  Taken 4/19/2023 0800 by Cha Jefferson RN  Activity Management: activity encouraged  Range of Motion: active ROM (range of motion) encouraged  Intervention: Prevent Infection  Recent Flowsheet Documentation  Taken 4/19/2023 1400 by Cha Jefferson RN  Infection Prevention: hand hygiene promoted  Taken 4/19/2023 1000 by Cha Jefferson RN  Infection Prevention: hand hygiene promoted  Taken 4/19/2023 0800 by Cha Jefferson RN  Infection Prevention: hand hygiene promoted  Goal: Optimal Comfort and Wellbeing  Outcome: Ongoing, Progressing  Goal: Readiness for Transition of  Care  Outcome: Ongoing, Progressing     Problem: Fall Injury Risk  Goal: Absence of Fall and Fall-Related Injury  Outcome: Ongoing, Progressing  Intervention: Identify and Manage Contributors  Recent Flowsheet Documentation  Taken 4/19/2023 1400 by Cha Jefferson RN  Medication Review/Management: medications reviewed  Taken 4/19/2023 1000 by Cha Jefferson RN  Medication Review/Management: medications reviewed  Taken 4/19/2023 0800 by Cha Jefferson RN  Medication Review/Management: medications reviewed  Intervention: Promote Injury-Free Environment  Recent Flowsheet Documentation  Taken 4/19/2023 1400 by Cha Jefferson RN  Safety Promotion/Fall Prevention: safety round/check completed  Taken 4/19/2023 1000 by Cha Jefferson RN  Safety Promotion/Fall Prevention: safety round/check completed  Taken 4/19/2023 0800 by Cha Jefferson RN  Safety Promotion/Fall Prevention: safety round/check completed     Problem: Diabetes Comorbidity  Goal: Blood Glucose Level Within Targeted Range  Outcome: Ongoing, Progressing     Problem: Heart Failure Comorbidity  Goal: Maintenance of Heart Failure Symptom Control  Outcome: Ongoing, Progressing  Intervention: Maintain Heart Failure-Management  Recent Flowsheet Documentation  Taken 4/19/2023 1400 by Cha Jeffesron RN  Medication Review/Management: medications reviewed  Taken 4/19/2023 1000 by Cha Jefferson RN  Medication Review/Management: medications reviewed  Taken 4/19/2023 0800 by Cha Jefferson RN  Medication Review/Management: medications reviewed     Problem: Hypertension Comorbidity  Goal: Blood Pressure in Desired Range  Outcome: Ongoing, Progressing  Intervention: Maintain Blood Pressure Management  Recent Flowsheet Documentation  Taken 4/19/2023 1400 by Cha Jefferson RN  Medication Review/Management: medications reviewed  Taken 4/19/2023 1000 by Cha Jefferson RN  Medication Review/Management: medications reviewed  Taken 4/19/2023 0800 by Cha Jefferson  RN  Medication Review/Management: medications reviewed     Problem: Osteoarthritis Comorbidity  Goal: Maintenance of Osteoarthritis Symptom Control  Outcome: Ongoing, Progressing  Intervention: Maintain Osteoarthritis Symptom Control  Recent Flowsheet Documentation  Taken 4/19/2023 1400 by Cha Jefferson RN  Activity Management: activity encouraged  Medication Review/Management: medications reviewed  Taken 4/19/2023 1000 by Cha Jefferson RN  Activity Management: activity encouraged  Medication Review/Management: medications reviewed  Taken 4/19/2023 0800 by Cha Jefferson RN  Activity Management: activity encouraged  Medication Review/Management: medications reviewed     Problem: Pain Chronic (Persistent) (Comorbidity Management)  Goal: Acceptable Pain Control and Functional Ability  Outcome: Ongoing, Progressing  Intervention: Manage Persistent Pain  Recent Flowsheet Documentation  Taken 4/19/2023 1400 by Cha Jefferson RN  Medication Review/Management: medications reviewed  Taken 4/19/2023 1000 by Cha Jefferson RN  Medication Review/Management: medications reviewed  Taken 4/19/2023 0800 by Cha Jefferson RN  Medication Review/Management: medications reviewed     Problem: Skin Injury Risk Increased  Goal: Skin Health and Integrity  Outcome: Ongoing, Progressing  Intervention: Optimize Skin Protection  Recent Flowsheet Documentation  Taken 4/19/2023 1400 by Cha Jefferson RN  Pressure Reduction Techniques:   frequent weight shift encouraged   heels elevated off bed   positioned off wounds   pressure points protected   weight shift assistance provided  Head of Bed (HOB) Positioning: HOB elevated  Pressure Reduction Devices:   foam padding utilized   heel offloading device utilized   positioning supports utilized  Skin Protection: incontinence pads utilized  Taken 4/19/2023 1000 by Cha Jefferson RN  Pressure Reduction Techniques:   frequent weight shift encouraged   heels elevated off bed   positioned off  wounds   pressure points protected   weight shift assistance provided  Head of Bed (HOB) Positioning: [facility] elevated  Pressure Reduction Devices:   foam padding utilized   heel offloading device utilized   positioning supports utilized  Skin Protection: incontinence pads utilized  Taken 4/19/2023 0800 by Cha Jefferson RN  Pressure Reduction Techniques:   frequent weight shift encouraged   heels elevated off bed   positioned off wounds   pressure points protected   weight shift assistance provided  Head of Bed (HOB) Positioning: [facility] elevated  Pressure Reduction Devices:   foam padding utilized   heel offloading device utilized   positioning supports utilized  Skin Protection: incontinence pads utilized   Goal Outcome Evaluation:

## 2023-04-19 NOTE — THERAPY EVALUATION
Acute Care - Wound/Debridement Initial Evaluation  [facility]     Patient Name: Susan Anderson  : 1940  MRN: [id number]  Today's Date: 2023                Admit Date: 2023    Visit Dx:    ICD-10-CM ICD-9-CM   1. Cellulitis of right foot due to methicillin-resistant Staphylococcus aureus  L03.115 682.7    B95.62 041.12   2. Diabetic ulcer of heel associated with type 2 diabetes mellitus, unspecified laterality, unspecified ulcer stage  E11.621 250.80    L97.409 707.14   3. Stage 3b chronic kidney disease  N18.32 585.3   4. Diabetes mellitus type 2 in obese  E11.69 250.00    E66.9 278.00   5. Elevated troponin  R77.8 790.6   6. Elevated blood pressure reading with diagnosis of hypertension  I10 401.9     L medial heel      R medial heel      R anterior knee      L anterior leg        Patient Active Problem List   Diagnosis   • Diabetic foot ulcer   • PVD (peripheral vascular disease) ([facility])   • Osteomyelitis   • Diabetes mellitus with neuropathy   • Essential hypertension   • Stage 3a chronic kidney disease   • Acute on chronic systolic CHF (congestive heart failure)   • Mitral valve disease   • NICM (nonischemic cardiomyopathy) ([facility])   • CAD   • Dyslipidemia   • Chronic combined systolic and diastolic heart failure    • Lumbar stenosis with neurogenic claudication   • Spondylolisthesis, lumbar region   • Gait disturbance   • Anemia   • Sleep apnea   • Paroxysmal atrial fibrillation   • T2DM    • Iron deficiency anemia, unspecified   • Cellulitis of right foot due to methicillin-resistant Staphylococcus aureus   • Acute respiratory failure with hypoxia and hypercarbia        Past Medical History:   Diagnosis Date   • Anemia    • Anxiety    • Arthritis    • Asthma  - double pneumonia    Currently on inhaler and nebulizer   • Atrial fibrillation 2022   • Brain tumor     Benign and followed at    • Cancer     cervical cancer, skin cancer   • CHF (congestive heart failure) 2021   •  Chronic kidney disease Related to diabetes   • Clotting disorder 10/22    Upper GI bleed from blood thinners aggravate ulcers   • Coronary artery disease 6/4/2021 DX for hear failure   • COVID-19 01/28/2023   • Depression 8/22   • Diabetes mellitus 30 years    Seeing Dr. Lam 1st time Aug 19   • Dizziness 07/27/2022   • Effusion of right knee 08/12/2022   • Fall 07/27/2022   • GERD (gastroesophageal reflux disease)    • Gout    • History of medical problems 8/22    AFIB   • History of staph infection 10/2021    right toe   • History of transfusion     no reactions, 1 unit, [facility]   • HL (hearing loss)     Acoustic neuroma right   • Hx of colonoscopy    • Hyperlipidemia Reference current labs x 2-3yrs approx   • Hypertension 30 years   • Hypomagnesemia 10/06/2022   • Insomnia    • Low back pain    • Migraine    • Mitral valve disease    • Mitral valve disease    • Osteomyelitis    • Peptic ulceration 10/22    Secondary to blood thinners   • Peripheral neuropathy    • Physical deconditioning 05/17/2022   • Pneumonia due to infectious organism 06/04/2021   • Pressure injury of skin of sacral region 08/21/2022   • Renal insufficiency 2021   • Sepsis 01/28/2023   • Sleep apnea    • Syncope, unspecified syncope type 10/06/2022   • Type 2 diabetes mellitus     30 years   • Weakness 07/27/2022        Past Surgical History:   Procedure Laterality Date   • ABDOMINAL HYSTERECTOMY W/SALPINGECTOMY     • AMPUTATION  Right great toe, 1st 1/3 metatarsal -Reg 4/14/21   • AORTAGRAM N/A 04/09/2021    Procedure: AORTAGRAM WITH OR WITHOUT RUNOFFS, WITH Co2;  Surgeon: Trey Forrest MD;  Location: [facility];  Service: Vascular;  Laterality: N/A;   • AORTAGRAM N/A 09/28/2022    Procedure: CO2 ANGIOGRAM, LEFT SFA ANGIOPLASTY WITH DRUG ELUTING BALLOON, LEFT SFA STENT PLACEMENT ;  Surgeon: Trey Forrest MD;  Location: [facility];  Service: Vascular;  Laterality: N/A;  FLUORO: 13.12  DOSE 162mGy  CONTRAST: Isovue 350: 15ml    • APPENDECTOMY     • BRAIN TUMOR EXCISION      laser surgery    • CARDIAC CATHETERIZATION N/A 06/21/2021    Procedure: LEFT HEART CATH;  Surgeon: Anjum Ceballos MD;  Location:  [facility];  Service: Cardiology;  Laterality: N/A;   • CATARACT EXTRACTION W/ INTRAOCULAR LENS  IMPLANT, BILATERAL     • CHOLECYSTECTOMY     • COLONOSCOPY     • ENDOSCOPY N/A 10/07/2022    Procedure: ESOPHAGOGASTRODUODENOSCOPY;  Surgeon: Stef Veras MD;  Location:  [facility];  Service: Gastroenterology;  Laterality: N/A;   • EYE SURGERY     • FEMORAL ARTERY STENT  10/2022   • HYSTERECTOMY     • INTERVENTIONAL RADIOLOGY PROCEDURE N/A 1/15/2023    Procedure: CV INTERVENTIONAL RADIOLOGY PROCEDURE;  Surgeon: Sanket Zapata MD;  Location:  [facility];  Service: Interventional Radiology;  Laterality: N/A;   • TOE SURGERY     • TRANS METATARSAL AMPUTATION Right 04/14/2021    Procedure: AMPUTATION TRANS METATARSAL RIGHT GREAT TOE;  Surgeon: Valdez Finney MD;  Location:  [facility];  Service: Vascular;  Laterality: Right;           Wound 01/28/23 1613 Left posterior heel Pressure Injury (Active)   Wound Image   04/19/23 0925   Pressure Injury Stage 3 04/18/23 2001   Dressing Appearance intact;moist drainage 04/19/23 0925   Closure Open to air 04/19/23 0800   Base moist;dry;black eschar;slough;necrotic 04/19/23 0925   Black (%), Wound Tissue Color 100 04/19/23 0925   Periwound intact;dry;redness;swelling;warm 04/19/23 0925   Periwound Temperature warm 04/19/23 0925   Periwound Skin Turgor soft 04/19/23 0925   Edges open 04/19/23 0925   Wound Length (cm) 1.9 cm 04/19/23 0925   Wound Width (cm) 1.5 cm 04/19/23 0925   Wound Surface Area (cm^2) 2.85 cm^2 04/19/23 0925   Drainage Characteristics/Odor yellow 04/19/23 0925   Drainage Amount scant 04/19/23 0925   Care, Wound cleansed with;soap and water;debrided;honey applied 04/19/23 0925   Dressing Care dressing applied;antimicrobial agent  applied;foam;low-adherent;border dressing 04/19/23 0925   Periwound Care cleansed with pH balanced cleanser;dry periwound area maintained;other (see comments) 04/19/23 0925       Wound 03/15/23 1115 Left proximal leg Skin Tear (Active)   Wound Image   04/19/23 0925   Pressure Injury Stage 1 04/18/23 2001   Dressing Appearance intact;moist drainage 04/19/23 0925   Closure Open to air 04/19/23 0800   Base necrotic;pink;epithelialization 04/19/23 0925   Periwound intact;dry;pink 04/19/23 0925   Periwound Temperature warm 04/19/23 0925   Periwound Skin Turgor soft 04/19/23 0925   Edges open 04/19/23 0925   Drainage Characteristics/Odor serosanguineous 04/19/23 0925   Drainage Amount scant 04/19/23 0925   Care, Wound cleansed with;soap and water 04/19/23 0925   Dressing Care dressing applied;border dressing;low-adherent 04/19/23 0925   Periwound Care cleansed with pH balanced cleanser;dry periwound area maintained 04/19/23 0925       Wound 03/31/23 1345 Right medial heel Fissure (Active)   Wound Image   04/19/23 0925   Pressure Injury Stage 3 04/18/23 2001   Dressing Appearance intact;moist drainage 04/19/23 0925   Closure Open to air 04/19/23 0800   Base moist;dry;black eschar;non-granulating 04/19/23 0925   Black (%), Wound Tissue Color 80 04/19/23 0925   Yellow (%), Wound Tissue Color 20 04/19/23 0925   Periwound intact;redness;warm 04/19/23 0925   Periwound Temperature warm 04/19/23 0925   Periwound Skin Turgor soft 04/19/23 0925   Edges open 04/19/23 0925   Wound Length (cm) 1 cm 04/19/23 0925   Wound Width (cm) 1.3 cm 04/19/23 0925   Wound Surface Area (cm^2) 1.3 cm^2 04/19/23 0925   Drainage Characteristics/Odor yellow 04/19/23 0925   Drainage Amount scant 04/19/23 0925   Care, Wound cleansed with;soap and water;debrided 04/19/23 0925   Dressing Care dressing applied;antimicrobial agent applied;low-adherent;foam;border dressing 04/19/23 0925   Periwound Care cleansed with pH balanced cleanser;dry periwound area  maintained;other (see comments) 04/19/23 0925       Wound 04/11/23 1300 Right lateral fourth toe Soft Tissue Necrosis (Active)   Pressure Injury Stage 3 04/18/23 2001   Dressing Appearance open to air 04/19/23 0925   Closure Open to air 04/19/23 0800   Base dry;black eschar 04/19/23 0925   Periwound intact;dry;redness 04/19/23 0925   Periwound Temperature warm 04/19/23 0925   Periwound Skin Turgor soft 04/19/23 0925   Drainage Amount none 04/19/23 0925   Care, Wound cleansed with;soap and water;honey applied 04/19/23 0925   Dressing Care dressing applied;silver impregnated;low-adherent;foam 04/19/23 0925         WOUND DEBRIDEMENT  Total area of Debridement: ~2cm2  Debridement Site 1  Location- Site 1: BLEs  Selective Debridement- Site 1: Wound Surface <20cmsq  Instruments- Site 1: tweezers  Excised Tissue Description- Site 1: scant, slough, adipose (grossly limited by pain)  Bleeding- Site 1: none               PT Assessment (last 12 hours)     PT Evaluation and Treatment     Row Name 04/19/23 0925          Physical Therapy Time and Intention    Subjective Information complains of;weakness;fatigue;pain;nausea/vomiting  -     Document Type evaluation;wound care  -     Mode of Treatment physical therapy;individual therapy  -     Row Name 04/19/23 0925          General Information    Patient Profile Reviewed yes  -     Patient Observations alert;cooperative;agree to therapy  -     Patient/Family/Caregiver Comments/Observations Pt very known to  wound care with bilateral heel wounds and vascular insufficiency.  -     Risks Reviewed patient:  -     Benefits Reviewed patient:  -     Barriers to Rehab medically complex  -     Row Name 04/19/23 0925          Pain    Pretreatment Pain Rating 6/10  -     Posttreatment Pain Rating 8/10  -     Pain Location - Side/Orientation Bilateral  -     Pain Location - abdomen  bilateral heel pain  -     Pain Intervention(s) Repositioned  -     Row Name  "04/19/23 0925          Cognition    Affect/Mental Status (Cognition) WFL;sad/depressed affect  -LH     Orientation Status (Cognition) oriented x 3  -LH     Row Name 04/19/23 0925          Wound 01/28/23 1613 Left posterior heel Pressure Injury    Wound - Properties Group Placement Date: 01/28/23  -WR Placement Time: 1613 -WR Present on Hospital Admission: Y  -WR Side: Left  -WR Orientation: posterior  -WR Location: heel  -WR Primary Wound Type: Pressure inj  -WR    Wound Image Images linked: 1  -LH     Dressing Appearance intact;moist drainage  -     Base moist;dry;black eschar;slough;necrotic  -     Black (%), Wound Tissue Color 100  -     Periwound intact;dry;redness;swelling;warm  -     Periwound Temperature warm  -     Periwound Skin Turgor soft  -     Edges open  -     Wound Length (cm) 1.9 cm  -     Wound Width (cm) 1.5 cm  -     Wound Depth (cm) --  obscured  -     Wound Surface Area (cm^2) 2.85 cm^2  -     Drainage Characteristics/Odor yellow  -     Drainage Amount scant  -LH     Care, Wound cleansed with;soap and water;debrided;honey applied  -     Dressing Care dressing applied;antimicrobial agent applied;foam;low-adherent;border dressing  Vicky Lara, 4\" optifoam, primafix tape  -     Periwound Care cleansed with pH balanced cleanser;dry periwound area maintained;other (see comments)  NoSting  -     Retired Wound - Properties Group Placement Date: 01/28/23  -WR Placement Time: 1613 -WR Present on Hospital Admission: Y  -WR Side: Left  -WR Orientation: posterior  -WR Location: heel  -WR Primary Wound Type: Pressure inj  -WR    Retired Wound - Properties Group Date first assessed: 01/28/23  -WR Time first assessed: 1613 -WR Present on Hospital Admission: Y  -WR Side: Left  -WR Location: heel  -WR Primary Wound Type: Pressure inj  -WR    Row Name 04/19/23 0925          Wound 04/11/23 1300 Right lateral fourth toe Soft Tissue Necrosis    Wound - Properties Group Placement " "Date: 04/11/23  - Placement Time: 1300  - Present on Hospital Admission: N  - Side: Right  - Orientation: lateral  - Location: fourth toe  -MC Primary Wound Type: Soft tissue  -MC    Dressing Appearance open to air  -     Base dry;black eschar  -     Periwound intact;dry;redness  -     Periwound Temperature warm  -     Periwound Skin Turgor soft  -LH     Drainage Amount none  -     Care, Wound cleansed with;soap and water;honey applied  -     Dressing Care dressing applied;silver impregnated;low-adherent;foam  Therahoney, mepilex Ag, primafix tape  -     Retired Wound - Properties Group Placement Date: 04/11/23  - Placement Time: 1300  - Present on Hospital Admission: N  - Side: Right  - Orientation: lateral  - Location: fourth toe  -MC Primary Wound Type: Soft tissue  -MC    Retired Wound - Properties Group Date first assessed: 04/11/23  - Time first assessed: 1300  - Present on Hospital Admission: N  - Side: Right  - Location: fourth toe  -MC Primary Wound Type: Soft tissue  -MC    Row Name 04/19/23 0925          Wound 03/15/23 1115 Left proximal leg Skin Tear    Wound - Properties Group Placement Date: 03/15/23  - Placement Time: 1115  - Present on Hospital Admission: Y  - Side: Left  - Orientation: proximal  - Location: leg  - Primary Wound Type: Skin tear  -MC    Wound Image Images linked: 1  -LH     Dressing Appearance intact;moist drainage  -     Base necrotic;pink;epithelialization  -     Periwound intact;dry;pink  -     Periwound Temperature warm  -     Periwound Skin Turgor soft  -LH     Edges open  -     Drainage Characteristics/Odor serosanguineous  -     Drainage Amount scant  -     Care, Wound cleansed with;soap and water  -     Dressing Care dressing applied;border dressing;low-adherent  6\" optifoam  -     Periwound Care cleansed with pH balanced cleanser;dry periwound area maintained  -     Retired Wound - Properties Group " "Placement Date: 03/15/23  - Placement Time: 1115  -MC Present on Hospital Admission: Y  -MC Side: Left  - Orientation: proximal  -MC Location: leg  -MC Primary Wound Type: Skin tear  -MC    Retired Wound - Properties Group Date first assessed: 03/15/23  - Time first assessed: 1115  -MC Present on Hospital Admission: Y  -MC Side: Left  -MC Location: leg  -MC Primary Wound Type: Skin tear  -MC    Row Name 04/19/23 0925          Wound 03/31/23 1345 Right medial heel Fissure    Wound - Properties Group Placement Date: 03/31/23  - Placement Time: 1345  - Side: Right  - Orientation: medial  - Location: heel  -JM Primary Wound Type: Fissure  -JM    Wound Image Images linked: 1  -LH     Dressing Appearance intact;moist drainage  -     Base moist;dry;black eschar;non-granulating  -     Black (%), Wound Tissue Color 80  -LH     Yellow (%), Wound Tissue Color 20  -     Periwound intact;redness;warm  -     Periwound Temperature warm  -     Periwound Skin Turgor soft  -     Edges open  -     Wound Length (cm) 1 cm  -     Wound Width (cm) 1.3 cm  -     Wound Depth (cm) --  obscured  -     Wound Surface Area (cm^2) 1.3 cm^2  -     Drainage Characteristics/Odor yellow  -LH     Drainage Amount scant  -LH     Care, Wound cleansed with;soap and water;debrided  -     Dressing Care dressing applied;antimicrobial agent applied;low-adherent;foam;border dressing  Vicky Lara, 4\" optifoam, primafix tape  -     Periwound Care cleansed with pH balanced cleanser;dry periwound area maintained;other (see comments)  NoSting  -     Retired Wound - Properties Group Placement Date: 03/31/23  - Placement Time: 1345  - Side: Right  - Orientation: medial  - Location: heel  -JM Primary Wound Type: Fissure  -JM    Retired Wound - Properties Group Date first assessed: 03/31/23  - Time first assessed: 1345  - Side: Right  - Location: heel  -JM Primary Wound Type: Fissure  -JM    Row Name 04/19/23 " 0925          Coping    Observed Emotional State afraid/fearful;quiet  -     Verbalized Emotional State anxiety;hopelessness  -     Trust Relationship/Rapport care explained;questions answered  -     Family/Support Persons caregiver  -     Row Name 04/19/23 0925          Plan of Care Review    Plan of Care Reviewed With patient  -     Progress no change  -     Outcome Evaluation PT wound care initial evaluation complete. Pt is well known to  PT wound care presenting with bilateral necrotic heel ulcerations and poor BLE skin integrity. Pt with severe complaints of bilateral heel pain limiting derbridement. PT applied therahoney to help promote autolytic debridment as pt's wound bases are fully covered in eschar and slough. Pt would continue to benefit from debridement as tolerated and advanced dressing changes every 2-3 days.  -     Row Name 04/19/23 0925          Positioning and Restraints    Pre-Treatment Position in bed  -     Post Treatment Position bed  -     In Bed supine;call light within reach;encouraged to call for assist;with family/caregiver  -     Row Name 04/19/23 0925          Therapy Assessment/Plan (PT)    Patient/Family Therapy Goals Statement (PT) Wound healing, pain relief  -     PT Diagnosis (PT) Chronic bilateral heel ulcers  -     Rehab Potential (PT) fair, will monitor progress closely  -     Row Name 04/19/23 0925          Therapy Plan Review/Discharge Plan (PT)    Therapy Plan Review (PT) evaluation/treatment results reviewed;care plan/treatment goals reviewed;risks/benefits reviewed;current/potential barriers reviewed;participants voiced agreement with care plan;participants included;patient;caregiver  -     Row Name 04/19/23 0925          Physical Therapy Goals    Wound Care Goal Selection (PT) wound care, PT goal 1;wound care, PT goal 2  -     Row Name 04/19/23 0925          Wound Care Goal 1 (PT)    Wound Care Goal 1 (PT) Decrease pain to <5/10 during  dressing change/debridement  -     Time Frame (Wound Care Goal 1, PT) long term goal (LTG);10 days  -     Row Name 04/19/23 0925          Wound Care Goal 2 (PT)    Wound Care Goal 2 (PT) Decrease wound area by 10% as evidence of wound healing.  -     Time Frame (Wound Care Goal 2, PT) long term goal (LTG);10 days  -           User Key  (r) = Recorded By, (t) = Taken By, (c) = Cosigned By    Initials Name Provider Type    Lucila Mohan, PT Physical Therapist    Shi Mane, PT Physical Therapist    Aditi Shafer, RN Registered Nurse    López Fuller, PT Physical Therapist              Physical Therapy Education     Title: PT OT SLP Therapies (In Progress)     Topic: Physical Therapy (In Progress)     Point: Mobility training (In Progress)     Learning Progress Summary           Patient Acceptance, E,TB, NR by  at 4/19/2023 1159    Acceptance, E, NR by KR at 4/18/2023 0824   Family Acceptance, E, VU by KR at 4/19/2023 0818                   Point: Home exercise program (In Progress)     Learning Progress Summary           Patient Acceptance, E, NR by KR at 4/18/2023 0824   Family Acceptance, E, VU by KR at 4/19/2023 0818                   Point: Body mechanics (In Progress)     Learning Progress Summary           Patient Acceptance, E,TB, NR by  at 4/19/2023 1159    Acceptance, E, NR by KR at 4/18/2023 0824   Family Acceptance, E, VU by KR at 4/19/2023 0818                   Point: Precautions (In Progress)     Learning Progress Summary           Patient Acceptance, E,TB, NR by  at 4/19/2023 1159    Acceptance, E, NR by KR at 4/18/2023 0824   Family Acceptance, E, VU by KR at 4/19/2023 0818                               User Key     Initials Effective Dates Name Provider Type [facility] 09/22/22 -  Anna White, PT Physical Therapist PT    KR 01/16/23 -  Cha Jefferson, RN Registered Nurse Nurse                Recommendation and Plan  Anticipated Discharge  Disposition (PT): skilled nursing facility  Planned Therapy Interventions (PT): balance training, bed mobility training, home exercise program, neuromuscular re-education, ROM (range of motion), strengthening, postural re-education, patient/family education, transfer training  Therapy Frequency (PT): daily  Plan of Care Reviewed With: patient   Progress: no change       Progress: no change  Outcome Evaluation: PT wound care initial evaluation complete. Pt is well known to  PT wound care presenting with bilateral necrotic heel ulcerations and poor BLE skin integrity. Pt with severe complaints of bilateral heel pain limiting derbridement. PT applied therahoney to help promote autolytic debridment as pt's wound bases are fully covered in eschar and slough. Pt would continue to benefit from debridement as tolerated and advanced dressing changes every 2-3 days.  Plan of Care Reviewed With: patient            Time Calculation   PT Charges     Row Name 04/19/23 1412 04/19/23 1200          Time Calculation    Start Time 0925  - 0815  -     PT Received On -- 04/19/23  -     PT Goal Re-Cert Due Date 04/29/23  - 04/29/23  -        Timed Charges    48519 - PT Therapeutic Activity Minutes -- 8  -HM        Untimed Charges    PT Eval/Re-eval Minutes -- 50  -HM     Wound Care 68993 Selective debridement  - --     [id number] debridement 35  -LH --        Total Minutes    Timed Charges Total Minutes -- 8  -HM     Untimed Charges Total Minutes 35  -LH 50  -HM      Total Minutes 35  -LH 58  -HM           User Key  (r) = Recorded By, (t) = Taken By, (c) = Cosigned By    Initials Name Provider Type     López Graham, PT Physical Therapist     Anna White PT Physical Therapist                  Therapy Charges for Today     Code Description Service Date Service Provider Modifiers Qty    [id number] HC PIPE DEBRIDE OPEN WOUND UP TO 20CM 4/19/2023 López Graham, PT GP 1            PT G-Codes  Outcome Measure  Options: AM-PAC 6 Clicks Basic Mobility (PT)  AM-PAC 6 Clicks Score (PT): 10  AM-PAC 6 Clicks Score (OT): 12       López Graham, PT  4/19/2023

## 2023-04-19 NOTE — PROGRESS NOTES
[facility] Cardiothoracic Surgery In-Patient Progress Note     LOS: 2 days     Chief Complaint: PAD/BLE nonhealing ulcerations    Subjective  Continued dyspnea, does feel better. On 3L NC saturations 93%.       Objective  Vital Signs  Temp:  [96.1 °F (35.6 °C)-98.3 °F (36.8 °C)] 96.5 °F (35.8 °C)  Heart Rate:  [55-80] 67  Resp:  [16-22] 16  BP: (127-171)/(65-95) 146/71      Physical Exam:   General Appearance: alert, appears stated age and cooperative   Lungs: respirations regular, respirations even and respirations unlabored   Heart: normal S1, S2, no murmur, no gallop, no rub and no click     Results   Results from last 7 days   Lab Units 04/19/23  0428   WBC 10*3/mm3 7.25   HEMOGLOBIN g/dL 10.6*   HEMATOCRIT % 34.9   PLATELETS 10*3/mm3 200     Results from last 7 days   Lab Units 04/19/23  0428   SODIUM mmol/L 149*   POTASSIUM mmol/L 4.3   CHLORIDE mmol/L 104   CO2 mmol/L 30.0*   BUN mg/dL 71*   CREATININE mg/dL 1.39*   GLUCOSE mg/dL 216*   CALCIUM mg/dL 8.3*     Imaging Results (Last 24 Hours)     Procedure Component Value Units Date/Time    XR Chest 1 View [[id number]] Collected: 04/19/23 0739     Updated: 04/19/23 0742    Narrative:      XR CHEST 1 VW    Date of Exam: 4/19/2023 3:30 AM EDT    Indication: DYSPNEA, ON EXERTION    Comparison: CT chest 4/17/2023. Chest radiograph 4/17/2023.    Findings:  Stable moderate right and small left pleural effusions with overlying airspace disease/atelectasis. No pneumothorax or new lung opacity. Cardiac silhouette is partially obscured, but appears unchanged. No acute osseous abnormalities.      Impression:      Impression:  Stable bibasilar airspace disease/atelectasis overlying moderate right and small left pleural effusions.    Electronically Signed: Jerry Duron    4/19/2023 7:39 AM EDT    Workstation ID: [id number]          Assessment    Cellulitis of right foot due to methicillin-resistant Staphylococcus aureus    Diabetic foot ulcer    PVD (peripheral vascular  disease) (HCC)    Diabetes mellitus with neuropathy    Essential hypertension    Stage 3a chronic kidney disease    Acute on chronic systolic CHF (congestive heart failure)    Mitral valve disease    NICM (nonischemic cardiomyopathy) (HCC)    Dyslipidemia    Chronic combined systolic and diastolic heart failure     Paroxysmal atrial fibrillation    T2DM     Acute respiratory failure with hypoxia and hypercarbia      Plan   Bilateral lower extremity segmental blood pressures and pulse volume recordings  No plan for surgical intervention during this admission  Continue with medical optimization and diuresis  Follow-up in 1 to 2 weeks in office after discharge      INDIGO Nation  04/19/23  09:21 EDT

## 2023-04-19 NOTE — PROCEDURES
The following procedure was performed: Thora    Please see corresponding Radiology report for in detail procedural information. The Radiology report will be dictated shortly, if not done so already. Please see the IR RN note for the information regarding medicines administered if any, pradip-procedural vitals and I/O information.

## 2023-04-19 NOTE — PLAN OF CARE
Goal Outcome Evaluation:  Plan of Care Reviewed With: patient, caregiver           Outcome Evaluation: OT evaluation complete.  Pt noted with a decline in mob, self care, strength, endurance and an increase of soa from baseline.  Pt mod to max assist for bed mob, u/a to stand, O2 drop to 85% on nasal cannula with activity.  Recommend IPOT and SNF for rehab at d/c.

## 2023-04-19 NOTE — PLAN OF CARE
Goal Outcome Evaluation:  Plan of Care Reviewed With: patient        Progress: no change  Outcome Evaluation: Has alternated betwween oxygen nasal cannula and bipap through the night. Awake and alert this morning. Resp easy.

## 2023-04-19 NOTE — PLAN OF CARE
Goal Outcome Evaluation:  Plan of Care Reviewed With: patient        Progress: no change  Outcome Evaluation: PT wound care initial evaluation complete. Pt is well known to [facility] PT wound care presenting with bilateral necrotic heel ulcerations and poor BLE skin integrity. Pt with severe complaints of bilateral heel pain limiting derbridement. PT applied therahoney to help promote autolytic debridment as pt's wound bases are fully covered in eschar and slough. Pt would continue to benefit from debridement as tolerated and advanced dressing changes every 2-3 days.

## 2023-04-19 NOTE — PROGRESS NOTES
"[facility]  IP Progress Note      Chief Complaint: Follow-up of acute on chronic HFrEF    Subjective   Diuresed well, reports feeling much better, wants to know when she can go home.  Denying chest pain or shortness of breath.  Resting without BiPAP.    Objective     Blood pressure 146/71, pulse 67, temperature 96.5 °F (35.8 °C), temperature source Axillary, resp. rate 16, height 172.7 cm (68\"), weight 82.4 kg (181 lb 11.2 oz), SpO2 93 %.     Intake/Output Summary (Last 24 hours) at 4/19/2023 0731  Last data filed at 4/19/2023 0717  Gross per 24 hour   Intake --   Output 2700 ml   Net -2700 ml       Physical Exam:  General: No acute distress.   Neck: no JVD.  Chest:No respiratory distress, breath sounds are diminished at bases, right > left normal.  Bilateral fine crackles.  Cardiovascular: Normal S1 and S2, distant heart sounds   Extremities: No significant edema.    Results Review:     I reviewed the patient's new clinical results.    Results from last 7 days   Lab Units 04/19/23  0428   WBC 10*3/mm3 7.25   HEMOGLOBIN g/dL 10.6*   HEMATOCRIT % 34.9   PLATELETS 10*3/mm3 200     Results from last 7 days   Lab Units 04/19/23  0428   SODIUM mmol/L 149*   POTASSIUM mmol/L 4.3   CHLORIDE mmol/L 104   CO2 mmol/L 30.0*   BUN mg/dL 71*   CREATININE mg/dL 1.39*   CALCIUM mg/dL 8.3*   BILIRUBIN mg/dL 0.8   ALK PHOS U/L 164*   ALT (SGPT) U/L 13   AST (SGOT) U/L 19   GLUCOSE mg/dL 216*     Results from last 7 days   Lab Units 04/19/23  0428   SODIUM mmol/L 149*   POTASSIUM mmol/L 4.3   CHLORIDE mmol/L 104   CO2 mmol/L 30.0*   BUN mg/dL 71*   CREATININE mg/dL 1.39*   GLUCOSE mg/dL 216*   CALCIUM mg/dL 8.3*         Lab Results   Component Value Date    CKTOTAL 106 04/17/2023    TROPONINT 310 (C) 04/17/2023     Results from last 7 days   Lab Units 04/17/23  1841   TSH uIU/mL 3.640               Tele: Sinus Rythym with PACs      Assessment:  1. Cardiomyopathy/acute on chronic HFrEF, EF 41-45%. "  Excellent diuresis with clinical improvement  2. Bilateral pleural effusions R>L  3. Nonobstructive CAD  4. PAF, chronically anticoagulated with Eliquis and rate control with metoprolol  5. Hypertension.  6. Dyslipidemia  7. CKD stage III  8. Type 2 diabetes.    Plan:  1. Increase metoprolol to 25 mg daily which is her home dose.  2. Eliquis held on admission, patient may benefit from right thoracentesis.  3. She has diuresed well, renal function is stable.  Will change Bumex to 1 mg IV daily, this can be changed to oral on discharge.  4. Restart losartan 50 mg daily for better control of blood pressure and for GDMT.  5. No spironolactone due to kidney disease and high risk of hyperkalemia with concomitant losartan administration.  6. Continue statin for dyslipidemia management.  7. Management of multiple medical conditions per hospitalist.    Anjum Ceballos MD, FACC, [facility]

## 2023-04-19 NOTE — PROGRESS NOTES
INFECTIOUS DISEASE Progress Note    Susan Anderson  1940  [id number]    Admission Date: 4/17/2023      Requesting Provider: No ref. provider found  Evaluating Physician: Stef Pete MD    Reason for Consultation: Bilateral foot infections    History of present illness:    4/18/23: Patient is a 83 y.o. female With type 2 diabetes mellitus, stage III chronic kidney disease, coronary artery disease, systolic/diastolic congestive heart failure, Severe peripheral vascular disease, recurrent bilateral foot infections, recent bilateral heel decubitus ulcers, a recent MRSA right toe wound infection, and recent Covid 19 in late January 2023 who is seen today after she presented with dyspnea and fatigue, and malaise. I saw her recently in the office with bilateral heel decubitus ulcers and a right fourth toe ulcer with associated cellulitis.  I initially started her on Augmentin therapy but then switched to minocycline after a right fourth toe culture returned positive for MRSA. I followed her during an admission from 1/3 until 1/20/23 for bibasilar pneumonia, Klebsiella/E. Coli UTI, and PEA arrest with acute hypoxic respiratory failure.  She suffered from acute renal failure requiring dialysis but her dialysis has subsequently been discontinued.  She was readmitted from 1/28 to 2/2/23 for Covid 19 infection for which she received Decadron but was not able to receive remdesivir due to bradycardia.  She was readmitted last night with dyspnea and acute hypoxic respiratory failure.  She has required BiPAP and is currently on 30% FiO2 with an O2 saturation of 98%.  She is lethargic and unable to provide any history. Chest CT scan revealed moderate to large bilateral pleural effusions and bibasilar atelectasis with cardiomegaly.  She is undergoing diuresis.She has been started on intravenous daptomycin.  She has remained afebrile.Her pro-BNP yesterday was 19,401. Her creatinine was 1.63.Her white blood cell count  was 8.1.Her creatinine today is 1.4 and her white blood cell count is 6.4.     4/19/23:She remains afebrile.She is on 3 L of oxygen with an O2 saturation of 93%. X-ray today reveals right greater than left effusions/opacities.  She is scheduled to undergo right thoracentesis today.  She has decreased dyspnea.  She has some nausea but denies vomiting.    Past Medical History:   Diagnosis Date   • Anemia 8/22   • Anxiety    • Arthritis    • Asthma 6/4 - double pneumonia    Currently on inhaler and nebulizer   • Atrial fibrillation 08/21/2022   • Brain tumor     Benign and followed at    • Cancer     cervical cancer, skin cancer   • CHF (congestive heart failure) 06/04/2021   • Chronic kidney disease Related to diabetes   • Clotting disorder 10/22    Upper GI bleed from blood thinners aggravate ulcers   • Coronary artery disease 6/4/2021 DX for hear failure   • COVID-19 01/28/2023   • Depression 8/22   • Diabetes mellitus 30 years    Seeing Dr. Lam 1st time Aug 19   • Dizziness 07/27/2022   • Effusion of right knee 08/12/2022   • Fall 07/27/2022   • GERD (gastroesophageal reflux disease)    • Gout    • History of medical problems 8/22    AFIB   • History of staph infection 10/2021    right toe   • History of transfusion     no reactions, 1 unit, [facility]   • HL (hearing loss)     Acoustic neuroma right   • Hx of colonoscopy    • Hyperlipidemia Reference current labs x 2-3yrs approx   • Hypertension 30 years   • Hypomagnesemia 10/06/2022   • Insomnia    • Low back pain    • Migraine    • Mitral valve disease    • Mitral valve disease    • Osteomyelitis    • Peptic ulceration 10/22    Secondary to blood thinners   • Peripheral neuropathy    • Physical deconditioning 05/17/2022   • Pneumonia due to infectious organism 06/04/2021   • Pressure injury of skin of sacral region 08/21/2022   • Renal insufficiency 2021   • Sepsis 01/28/2023   • Sleep apnea    • Syncope, unspecified syncope type 10/06/2022   • Type 2 diabetes  mellitus     30 years   • Weakness 07/27/2022       Past Surgical History:   Procedure Laterality Date   • ABDOMINAL HYSTERECTOMY W/SALPINGECTOMY     • AMPUTATION  Right great toe, 1st 1/3 metatarsal -Reg 4/14/21   • AORTAGRAM N/A 04/09/2021    Procedure: AORTAGRAM WITH OR WITHOUT RUNOFFS, WITH Co2;  Surgeon: Trey Forrest MD;  Location:  [facility];  Service: Vascular;  Laterality: N/A;   • AORTAGRAM N/A 09/28/2022    Procedure: CO2 ANGIOGRAM, LEFT SFA ANGIOPLASTY WITH DRUG ELUTING BALLOON, LEFT SFA STENT PLACEMENT ;  Surgeon: Trey Forrest MD;  Location:  [facility];  Service: Vascular;  Laterality: N/A;  FLUORO: 13.12  DOSE 162mGy  CONTRAST: Isovue 350: 15ml   • APPENDECTOMY     • BRAIN TUMOR EXCISION      laser surgery    • CARDIAC CATHETERIZATION N/A 06/21/2021    Procedure: LEFT HEART CATH;  Surgeon: Anjum Ceballos MD;  Location:  [facility] LOCATION;  Service: Cardiology;  Laterality: N/A;   • CATARACT EXTRACTION W/ INTRAOCULAR LENS  IMPLANT, BILATERAL     • CHOLECYSTECTOMY     • COLONOSCOPY     • ENDOSCOPY N/A 10/07/2022    Procedure: ESOPHAGOGASTRODUODENOSCOPY;  Surgeon: Stef Veras MD;  Location:  [facility];  Service: Gastroenterology;  Laterality: N/A;   • EYE SURGERY     • FEMORAL ARTERY STENT  10/2022   • HYSTERECTOMY     • INTERVENTIONAL RADIOLOGY PROCEDURE N/A 1/15/2023    Procedure: CV INTERVENTIONAL RADIOLOGY PROCEDURE;  Surgeon: Sanket Zapata MD;  Location:  [facility]VASIVE LOCATION;  Service: Interventional Radiology;  Laterality: N/A;   • TOE SURGERY     • TRANS METATARSAL AMPUTATION Right 04/14/2021    Procedure: AMPUTATION TRANS METATARSAL RIGHT GREAT TOE;  Surgeon: Valdez Finney MD;  Location:  [facility];  Service: Vascular;  Laterality: Right;       Family History   Problem Relation Age of Onset   • Diabetes Mother         Type II   • Heart disease Father    • Heart attack Father    • Coronary artery disease Father    • Diabetes Father         Type  II   • Diabetes Sister    • Diabetes Maternal Grandmother    • Diabetes Maternal Grandfather    • Diabetes Paternal Grandmother    • Diabetes Paternal Grandfather        Social History     Socioeconomic History   • Marital status:    • Number of children: 1   Tobacco Use   • Smoking status: Never   • Smokeless tobacco: Never   Vaping Use   • Vaping Use: Never used   Substance and Sexual Activity   • Alcohol use: Not Currently     Comment: Social drinking when not recovering from hospitalization   • Drug use: Never   • Sexual activity: Not Currently     Birth control/protection: None       Allergies   Allergen Reactions   • Vancomycin Other (See Comments)     Acute Kidney Injury, requested by ID   • Baclofen Other (See Comments) and Hallucinations     PSYCHOSIS-POA REFUSES ADMINISTRATION OF THIS MED.   • Cephalexin Nausea Only   • Erythromycin Base Nausea Only   • Melatonin Other (See Comments)     Nightmares   • Oxycodone Nausea Only   • Protonix [Pantoprazole] Itching and Rash   • Sulfa Antibiotics Nausea Only         Medication:    Current Facility-Administered Medications:   •  !HOLD ANTICOAGULATION For 24 hours before thoracentesis, 1 each, Does not apply, BID, Breezy Felder MD  •  atorvastatin (LIPITOR) tablet 40 mg, 40 mg, Oral, Nightly, Faiza Angulo MD, 40 mg at 04/18/23 2052  •  sennosides-docusate (PERICOLACE) 8.6-50 MG per tablet 2 tablet, 2 tablet, Oral, BID, 2 tablet at 04/18/23 2052 **AND** polyethylene glycol (MIRALAX) packet 17 g, 17 g, Oral, Daily PRN **AND** bisacodyl (DULCOLAX) EC tablet 5 mg, 5 mg, Oral, Daily PRN **AND** bisacodyl (DULCOLAX) suppository 10 mg, 10 mg, Rectal, Daily PRN, Faiza Angulo MD  •  bumetanide (BUMEX) injection 1 mg, 1 mg, Intravenous, BID, Faiza Angulo MD, 1 mg at 04/18/23 1703  •  clonazePAM (KlonoPIN) tablet 0.5 mg, 0.5 mg, Oral, BID PRN, Faiza Angulo MD, 0.5 mg at 04/17/23 2154  •  clopidogrel (PLAVIX) tablet 75 mg, 75 mg, Oral, Daily,  Faiza Angulo MD, 75 mg at 04/18/23 0932  •  DAPTOmycin (CUBICIN) 300 mg in sodium chloride 0.9 % 50 mL IVPB, 4 mg/kg (Adjusted), Intravenous, Q48H, Faiza Angulo MD  •  famotidine (PEPCID) tablet 20 mg, 20 mg, Oral, BID AC, Breezy Felder MD, 20 mg at 04/18/23 1659  •  insulin detemir (LEVEMIR) injection 5 Units, 5 Units, Subcutaneous, Daily, Breezy Felder MD  •  Insulin Lispro (humaLOG) injection 0-7 Units, 0-7 Units, Subcutaneous, TID With Meals, Faiza Angulo MD, 2 Units at 04/18/23 1703  •  metoprolol succinate XL (TOPROL-XL) 24 hr tablet 25 mg, 25 mg, Oral, Q24H, Anjum Ceballos MD  •  nitroglycerin (NITROSTAT) ointment 0.5 inch, 0.5 inch, Topical, Q6H PRN, Faiza Angulo MD  •  Pharmacy Consult - Pharmacy to dose, , Does not apply, Continuous PRN, Faiza Angulo MD  •  polyethylene glycol (MIRALAX) packet 17 g, 17 g, Oral, Daily, Faiza Angulo MD, 17 g at 04/18/23 0932  •  sertraline (ZOLOFT) tablet 50 mg, 50 mg, Oral, Daily, Faiza Angulo MD, 50 mg at 04/18/23 0932  •  sodium chloride 0.9 % flush 10 mL, 10 mL, Intravenous, PRN, Faiza Angulo MD, 10 mL at 04/18/23 2053  •  traMADol (ULTRAM) tablet 25 mg, 25 mg, Oral, Q12H PRN, Faiza Angulo MD, 25 mg at 04/18/23 0937    Antibiotics:  Anti-Infectives (From admission, onward)    Ordered     Dose/Rate Route Frequency Start Stop    04/17/23 2112  DAPTOmycin (CUBICIN) 300 mg in sodium chloride 0.9 % 50 mL IVPB        Ordering Provider: Faiza Angulo MD    4 mg/kg × 69.2 kg (Adjusted)  100 mL/hr over 30 Minutes Intravenous Every 48 Hours 04/19/23 1800 04/25/23 1759    04/17/23 1542  DAPTOmycin (CUBICIN) 300 mg in sodium chloride 0.9 % 50 mL IVPB        Note to Pharmacy: MRSA w/ restriction from Vanco per ID.   Ordering Provider: Kings Herrera MD    300 mg  100 mL/hr over 30 Minutes Intravenous Once 04/17/23 1600 04/17/23 1801            Review of Systems:  See HPI    Physical Exam:   Vital Signs  Temp (24hrs),  Av.3 °F (36.3 °C), Min:96.1 °F (35.6 °C), Max:98.3 °F (36.8 °C)    Temp  Min: 96.1 °F (35.6 °C)  Max: 98.3 °F (36.8 °C)  BP  Min: 127/95  Max: 171/73  Pulse  Min: 55  Max: 80  Resp  Min: 16  Max: 22  SpO2  Min: 93 %  Max: 100 %    GENERAL: More alert and responsive today.  HEENT: No labial ulcers    NECK: Supple   LYMPH: No cervical, axillary or inguinal lymphadenopathy.  HEART: RRR; No murmur, rubs, gallops.   LUNGS: Decreased breath sounds at both bases.  ABDOMEN: Soft, nontender, nondistended.  No rebound or guarding. NO mass or HSM.  EXT:  1-2+ bilateral lower extremity edema.  She has bilateral 1.5 cm heel pressure ulcers.  The right-sided ulcer has moderate surrounding erythema today.  These are non-stageable but  may be stage III.  She has a right lateral fourth toe ulcer which is 0..75 x .4 cm  with some moderate surrounding erythema and central slough.  This appears to be 3 mm deep.  MSK: No joint effusions or erythema  SKIN: No diffuse rash present.    NEURO: She is more alert and arousable.    Laboratory Data    Results from last 7 days   Lab Units 23  0428 23  0349 23  1517   WBC 10*3/mm3 7.25 6.37 8.08   HEMOGLOBIN g/dL 10.6* 10.9* 11.7*   HEMATOCRIT % 34.9 36.3 39.3   PLATELETS 10*3/mm3 200 199 236     Results from last 7 days   Lab Units 23  0428   SODIUM mmol/L 149*   POTASSIUM mmol/L 4.3   CHLORIDE mmol/L 104   CO2 mmol/L 30.0*   BUN mg/dL 71*   CREATININE mg/dL 1.39*   GLUCOSE mg/dL 216*   CALCIUM mg/dL 8.3*     Results from last 7 days   Lab Units 23  0428   ALK PHOS U/L 164*   BILIRUBIN mg/dL 0.8   ALT (SGPT) U/L 13   AST (SGOT) U/L 19         Results from last 7 days   Lab Units 23  1517   CRP mg/dL 8.02*     Results from last 7 days   Lab Units 23  1517   LACTATE mmol/L 1.1     Results from last 7 days   Lab Units 23  1517   CK TOTAL U/L 106         Estimated Creatinine Clearance: 33.9 mL/min (A) (by C-G formula based on SCr of 1.39 mg/dL  (H)).      Microbiology:  No results found for: ACANTHNAEG, AFBCX, BPERTUSSISCX, BLOODCX  No results found for: BCIDPCR, CXREFLEX, CSFCX, CULTURETIS  No results found for: CULTURES, HSVCX, URCX  No results found for: EYECULTURE, GCCX, HSVCULTURE, LABHSV  No results found for: LEGIONELLA, MRSACX, MUMPSCX, MYCOPLASCX  No results found for: NOCARDIACX, STOOLCX  No results found for: THROATCX, UNSTIMCULT, URINECX, CULTURE, VZVCULTUR  Wound Culture   Date Value Ref Range Status   04/12/2023 Moderate growth (3+) Staphylococcus aureus, MRSA (A)  Final     Comment:       Methicillin resistant Staphylococcus aureus, Patient may be an isolation risk.           Radiology:  Imaging Results (Last 72 Hours)     Procedure Component Value Units Date/Time    XR Chest 1 View [[id number]] Resulted: 04/19/23 0411     Updated: 04/19/23 0411    CT Chest Without Contrast Diagnostic [[id number]] Collected: 04/17/23 2005     Updated: 04/17/23 2013    Narrative:      CT CHEST WO CONTRAST DIAGNOSTIC    Date of Exam: 4/17/2023 7:49 PM EDT    Indication: Pneumonia, complication suspected, xray done.    Comparison: 4/17/2023, 1/20/2023    Technique: Axial CT images were obtained of the chest without contrast administration.  Reconstructed coronal and sagittal images were also obtained. Automated exposure control and iterative construction methods were used.    Findings:  Hilum and Mediastinum: No pathologically enlarged lymph nodes. The heart appears enlarged. No pericardial effusion.  Unremarkable thoracic aorta and pulmonary arteries.    Lung Parenchyma and Pleura: Large bilateral pleural effusions are present. Intralobular septal thickening is present. Atelectatic changes are present within the bilateral lower lobes. No definite consolidation identified though evaluation is limited due   to significant atelectasis. Atherosclerotic calcifications are present including within the pulmonary arteries. Granulomatous calcifications are  present.    Upper Abdomen: No acute process.     Soft tissues: Unremarkable.    Osseous structures: No aggressive focal lytic or sclerotic osseous lesions.      Impression:      Impression:    1. At least moderate pulmonary edema pattern with large bilateral pleural effusions and bibasilar atelectasis. There is cardiomegaly. No definite consolidation identified though significant atelectatic changes are present. Superimposed pneumonia cannot   be excluded.  2. Ancillary findings as described above.    Electronically Signed: Karen Rey    4/17/2023 8:10 PM EDT    Workstation ID: [id number]    XR Chest 1 View [[id number]] Collected: 04/17/23 1609     Updated: 04/17/23 1614    Narrative:      XR CHEST 1 VW    Date of Exam: 4/17/2023 3:46 PM EDT    Indication: Weak/Dizzy/AMS triage protocol.    Comparison: January 28, 2023    FINDINGS:  There is a suspected moderate right and a small left pleural effusion. There are basilar predominant opacities with prominent pulmonary vasculature. The heart appears enlarged, as before. Right IJ catheter is no longer seen.  No pneumothorax is   identified.      Impression:      1.Moderate right and small left pleural effusions.  2.Basilar predominant opacities with cardiomegaly and prominent pulmonary vasculature, suspicious for edema and atelectasis.       Electronically Signed: Billy Hadley    4/17/2023 4:11 PM EDT    Workstation ID: [id number]      I read her radiographic studies.      Impression:   1.  MRSA right foot wound infection-with cellulitis of the right fourth toe and bilateral decubitus heel ulcers.  This has occurred in the setting of severe peripheral vascular disease and severe debility.  These will be very difficult to heal.  She is currently on daptomycin therapy.  2.  Pulmonary edema-secondary to heart failure and associated with bilateral pleural effusions  3.  Acute hypoxic respiratory failure-secondary to pulmonary edema  4.  Acute on chronic systolic/diastolic heart  failure  5.  Paroxysmal atrial fibrillation  6.  History of paroxysmal atrial fibrillation  7.  Severe peripheral vascular disease  8.  Severe debility  9.  Type 2 diabetes mellitus-this places her at increased risk for recurrent infections  10.  Stage III chronic kidney disease  11.  Recent Covid 19 infection-1/28/23.  This has contributed to her debility    PLAN/RECOMMENDATIONS:   1.  Continue daptomycin  2.  Continue diuresis  3.  Right thoracentesis    I discussed her situation again with Dr. Felder today.       Stef Pete MD  4/19/2023  07:40 EDT

## 2023-04-19 NOTE — PLAN OF CARE
Goal Outcome Evaluation:  Plan of Care Reviewed With: patient        Progress: no change  Outcome Evaluation: PT eval completed. Pt completed bed mobility Mod A- Dependent x2 with frequent cues for PLB and sequencing thoughout. Mobility limited d/t SOA and SpO2 dropped to 85% with rest and cueing for PLB to increase above 89%. d/c rec SNF.

## 2023-04-19 NOTE — NURSING NOTE
Image guided right thoracentesis performed by MD Jose. 1350 mls removed from pleural space. Patient tolerated well. Report called to nurse.

## 2023-04-19 NOTE — PROGRESS NOTES
[facility] Medicine Services  PROGRESS NOTE    Patient Name: Susan Anderson  : 1940  MRN: [id number]    Date of Admission: 2023  Primary Care Physician: Arleen Doherty MD    Subjective   Subjective     CC: AMS    HPI: Looks much better. No f/c. No n/v. Still SOA. Dry cough. No sweats. Some B LE aching/pain in feet.    Review of Systems   Constitutional: Positive for activity change and fatigue.   HENT: Negative.    Respiratory: Positive for chest tightness and shortness of breath.    Cardiovascular: Positive for leg swelling.   Gastrointestinal: Negative.    Genitourinary: Negative.          Vital Signs:   Temp:  [96.1 °F (35.6 °C)-98.3 °F (36.8 °C)] 96.5 °F (35.8 °C)  Heart Rate:  [55-80] 67  Resp:  [16-22] 16  BP: (127-171)/(65-95) 146/71  Flow (L/min):  [3-4] 3     Physical Exam:  NAD, alert and oriented  OP clear, dry MM  Neck supple  No LAD  RRR  Decreased at bases, remains diminished on R base  +BS, soft  GARG  Normal affect    Results Reviewed:  LAB RESULTS:      Lab 23  04223  0349 23  1517   WBC 7.25 6.37 8.08   HEMOGLOBIN 10.6* 10.9* 11.7*   HEMATOCRIT 34.9 36.3 39.3   PLATELETS 200 199 236   NEUTROS ABS  --  4.16 6.11   IMMATURE GRANS (ABS)  --  0.67* 0.51*   LYMPHS ABS  --  0.86 0.82   MONOS ABS  --  0.59 0.58   EOS ABS  --  0.07 0.04   MCV 99.7* 99.7* 101.6*   CRP  --   --  8.02*   PROCALCITONIN  --   --  0.13   LACTATE  --   --  1.1   LDH  --   --  527*   D DIMER QUANT  --   --  2.10*         Lab 23  0428 23  0543 23  1841 23  1517   SODIUM 149* 148*  --  142   POTASSIUM 4.3 4.9  --  5.7*   CHLORIDE 104 103  --  102   CO2 30.0* 26.0  --  31.0*   ANION GAP 15.0 19.0*  --  9.0   BUN 71* 76*  --  80*   CREATININE 1.39* 1.40*  --  1.63*   EGFR 37.5* 37.2*  --  31.0*   GLUCOSE 216* 160*  --  289*   CALCIUM 8.3* 8.6  --  8.4*   MAGNESIUM  --   --   --  2.1   TSH  --   --  3.640  --          Lab 23  0428  04/17/23  1517   TOTAL PROTEIN 4.9* 5.9*   ALBUMIN 2.3* 2.8*   GLOBULIN 2.6 3.1   ALT (SGPT) 13 22   AST (SGOT) 19 40*   BILIRUBIN 0.8 0.6   ALK PHOS 164* 208*         Lab 04/17/23  2027 04/17/23  1841 04/17/23  1517   PROBNP  --   --  19,401.0*  18,084.0*   HSTROP T 310* 331* 326*             Lab 04/17/23  1517   FERRITIN 63.04         Lab 04/18/23  0651 04/18/23  0329 04/18/23  0024   FIO2 30 30 21   CARBOXYHEMOGLOBIN (VENOUS) 1.9 1.9 1.7     Brief Urine Lab Results  (Last result in the past 365 days)      Color   Clarity   Blood   Leuk Est   Nitrite   Protein   CREAT   Urine HCG        04/17/23 1528 Yellow   Clear   Trace   Negative   Negative   >=300 mg/dL (3+)                 Microbiology Results Abnormal     Procedure Component Value - Date/Time    Blood Culture - Blood, Arm, Right [[id number]]  (Normal) Collected: 04/17/23 1553    Lab Status: Preliminary result Specimen: Blood from Arm, Right Updated: 04/18/23 1701     Blood Culture No growth at 24 hours    Blood Culture - Blood, Arm, Left [[id number]]  (Normal) Collected: 04/17/23 1600    Lab Status: Preliminary result Specimen: Blood from Arm, Left Updated: 04/18/23 1701     Blood Culture No growth at 24 hours          CT Chest Without Contrast Diagnostic    Result Date: 4/17/2023  CT CHEST WO CONTRAST DIAGNOSTIC Date of Exam: 4/17/2023 7:49 PM EDT Indication: Pneumonia, complication suspected, xray done. Comparison: 4/17/2023, 1/20/2023 Technique: Axial CT images were obtained of the chest without contrast administration.  Reconstructed coronal and sagittal images were also obtained. Automated exposure control and iterative construction methods were used. Findings: Hilum and Mediastinum: No pathologically enlarged lymph nodes. The heart appears enlarged. No pericardial effusion.  Unremarkable thoracic aorta and pulmonary arteries. Lung Parenchyma and Pleura: Large bilateral pleural effusions are present. Intralobular septal thickening is present.  Atelectatic changes are present within the bilateral lower lobes. No definite consolidation identified though evaluation is limited due to significant atelectasis. Atherosclerotic calcifications are present including within the pulmonary arteries. Granulomatous calcifications are present. Upper Abdomen: No acute process. Soft tissues: Unremarkable. Osseous structures: No aggressive focal lytic or sclerotic osseous lesions.     Impression: Impression: 1. At least moderate pulmonary edema pattern with large bilateral pleural effusions and bibasilar atelectasis. There is cardiomegaly. No definite consolidation identified though significant atelectatic changes are present. Superimposed pneumonia cannot be excluded. 2. Ancillary findings as described above. Electronically Signed: Karen Rey  4/17/2023 8:10 PM EDT  Workstation ID: [id number]    XR Chest 1 View    Result Date: 4/17/2023  XR CHEST 1 VW Date of Exam: 4/17/2023 3:46 PM EDT Indication: Weak/Dizzy/AMS triage protocol. Comparison: January 28, 2023 FINDINGS: There is a suspected moderate right and a small left pleural effusion. There are basilar predominant opacities with prominent pulmonary vasculature. The heart appears enlarged, as before. Right IJ catheter is no longer seen.  No pneumothorax is identified.     Impression: 1.Moderate right and small left pleural effusions. 2.Basilar predominant opacities with cardiomegaly and prominent pulmonary vasculature, suspicious for edema and atelectasis.  Electronically Signed: Billy Hadley  4/17/2023 4:11 PM EDT  Workstation ID: [id number]      Results for orders placed during the hospital encounter of 01/03/23    Adult Transthoracic Echo Complete W/ Cont if Necessary Per Protocol    Interpretation Summary  •  Left ventricular systolic function is moderately decreased. Left ventricular ejection fraction appears to be 41 - 45%.  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  •   Mild mitral regurgitation.  •  Trace tricuspid regurgitation.  •  There is a moderate sized left pleural effusion.      Current medications:  Scheduled Meds:atorvastatin, 40 mg, Oral, Nightly  bumetanide, 1 mg, Intravenous, BID  clopidogrel, 75 mg, Oral, Daily  DAPTOmycin, 4 mg/kg (Adjusted), Intravenous, Q48H  famotidine, 20 mg, Oral, BID AC  insulin lispro, 0-7 Units, Subcutaneous, TID With Meals  metoprolol succinate XL, 12.5 mg, Oral, Q24H  polyethylene glycol, 17 g, Oral, Daily  senna-docusate sodium, 2 tablet, Oral, BID  sertraline, 50 mg, Oral, Daily      Continuous Infusions:hold, 1 each  Pharmacy Consult - Pharmacy to dose,       PRN Meds:.•  senna-docusate sodium **AND** polyethylene glycol **AND** bisacodyl **AND** bisacodyl  •  clonazePAM  •  hold  •  nitroglycerin  •  Pharmacy Consult - Pharmacy to dose  •  sodium chloride  •  traMADol    Assessment & Plan   Assessment & Plan     Active Hospital Problems    Diagnosis  POA   • **Cellulitis of right foot due to methicillin-resistant Staphylococcus aureus [L03.115, B95.62]  Yes   • Acute respiratory failure with hypoxia and hypercarbia [J96.01, J96.02]  Unknown   • T2DM  [E11.9]  Yes   • Paroxysmal atrial fibrillation [I48.0]  Yes   • Chronic combined systolic and diastolic heart failure  [I50.42]  Yes   • NICM (nonischemic cardiomyopathy) ([facility]) [I42.8]  Yes   • Dyslipidemia [E78.5]  Yes   • Mitral valve disease [I05.9]  Yes   • Acute on chronic systolic CHF (congestive heart failure) [I50.23]  Yes   • Diabetic foot ulcer [E11.621, L97.509]  Yes   • PVD (peripheral vascular disease) ([facility]) [I73.9]  Yes   • Diabetes mellitus with neuropathy [E11.40]  Yes   • Stage 3a chronic kidney disease [N18.31]  Yes   • Essential hypertension [I10]  Yes      Resolved Hospital Problems   No resolved problems to display.        Brief Hospital Course to date:  Susan Anderson is a 82 y.o. chronically ill female w/ HTN, HLD, CKD3, CAD, PAD, HFrEF (41-45%), PUD/GIB, pAfib,  DM2, with 6 admissions since August 2022 (predominantly for CHF). She was admitted 1/3-1/20 after PEA arrest requiring CPR after receiving propofol for EGD; and then COVID and acute renal failure requiring initiation of dialysis. Since then she has been battling bilateral heel wounds and been following with [facility] and Dr. Finney and outpatient PT. She has had peripheral stent placed as well.     Metabolic encephalopathy  -NIPPV as needed  -monitor progress, better     A/C HFrEF- ED 40%  Bilateral pleural effusions  -diuresis with bumex  -monitor electrolytes  -hyperkalemia better  -ask for cardiology assistance, following  -bradycardia overnight, decrease BB, cardiology to follow    B effusions R>L  -R likely would benefit from thoarcentesis  -hold eliquis, ask for IR thoracentesis, possibly tomorrow    Hx of PAF  -on Eliquis, hold for thoracentesis    Hx of PUD/GIB  -PPI allergy listed  -pepcid     Chronic bilateral heel wounds  -followed by Dr. Finney/[facility]  -on daptomycin  -wound care     Deconditioned  -PT/OT     T2DM  -SSI, add basal     DVT prophylaxis:  Medical DVT prophylaxis orders are present, will address 4/20 as will be off Eliquis    Expected Discharge Location and Transportation: Rehab  Expected Discharge   Expected Discharge Date and Time     Expected Discharge Date Expected Discharge Time    Apr 21, 2023            DVT prophylaxis:  No DVT prophylaxis order currently exists.     AM-PAC 6 Clicks Score (PT): 12 (04/18/23 0800)    CODE STATUS:   Code Status and Medical Interventions:   Ordered at: 04/17/23 1800     Code Status (Patient has no pulse and is not breathing):    No CPR (Do Not Attempt to Resuscitate)     Medical Interventions (Patient has pulse or is breathing):    Full       Breezy Felder MD  04/19/23

## 2023-04-19 NOTE — PRE-SEDATION DOCUMENTATION
[facility]   Vascular Interventional Radiology  History & Physicial        Patient Name:Susan Anderson    : 1940    MRN: [id number]    Primary Care Physician: Arleen Doherty MD    Referring Physician: No ref. provider found     Date of admission: 2023    Subjective     Reason for Consult: Thora    History of Present Illness     Susan Anderson is a 83 y.o. female referred to IR as noted above.      Active Hospital Problems:  Active Hospital Problems    Diagnosis     **Cellulitis of right foot due to methicillin-resistant Staphylococcus aureus     Acute respiratory failure with hypoxia and hypercarbia     T2DM      Paroxysmal atrial fibrillation     Chronic combined systolic and diastolic heart failure      NICM (nonischemic cardiomyopathy) (H[facility])     Dyslipidemia     Mitral valve disease     Acute on chronic systolic CHF (congestive heart failure)     Diabetic foot ulcer     PVD (peripheral vascular disease) ([facility])     Diabetes mellitus with neuropathy     Stage 3a chronic kidney disease     Essential hypertension        Personal History     Past Medical History:   Diagnosis Date    Anemia     Anxiety     Arthritis     Asthma  - double pneumonia    Currently on inhaler and nebulizer    Atrial fibrillation 2022    Brain tumor     Benign and followed at     Cancer     cervical cancer, skin cancer    CHF (congestive heart failure) 2021    Chronic kidney disease Related to diabetes    Clotting disorder 10/22    Upper GI bleed from blood thinners aggravate ulcers    Coronary artery disease 2021 DX for hear failure    COVID-19 2023    Depression     Diabetes mellitus 30 years    Seeing Dr. Lam 1st time Aug 19    Dizziness 2022    Effusion of right knee 2022    Fall 2022    GERD (gastroesophageal reflux disease)     Gout     History of medical problems     AFIB    History of staph infection 10/2021    right toe    History of transfusion     no  reactions, 1 unit, [facility]    HL (hearing loss)     Acoustic neuroma right    Hx of colonoscopy     Hyperlipidemia Reference current labs x 2-3yrs approx    Hypertension 30 years    Hypomagnesemia 10/06/2022    Insomnia     Low back pain     Migraine     Mitral valve disease     Mitral valve disease     Osteomyelitis     Peptic ulceration 10/22    Secondary to blood thinners    Peripheral neuropathy     Physical deconditioning 05/17/2022    Pneumonia due to infectious organism 06/04/2021    Pressure injury of skin of sacral region 08/21/2022    Renal insufficiency 2021    Sepsis 01/28/2023    Sleep apnea     Syncope, unspecified syncope type 10/06/2022    Type 2 diabetes mellitus     30 years    Weakness 07/27/2022       Past Surgical History:   Procedure Laterality Date    ABDOMINAL HYSTERECTOMY W/SALPINGECTOMY      AMPUTATION  Right great toe, 1st 1/3 metatarsal -Reg 4/14/21    AORTAGRAM N/A 04/09/2021    Procedure: AORTAGRAM WITH OR WITHOUT RUNOFFS, WITH Co2;  Surgeon: Trey Forrest MD;  [facility]  [facility];  Service: Vascular;  Laterality: N/A;    AORTAGRAM N/A 09/28/2022    Procedure: CO2 ANGIOGRAM, LEFT SFA ANGIOPLASTY WITH DRUG ELUTING BALLOON, LEFT SFA STENT PLACEMENT ;  Surgeon: Trey Forrest MD;  Location:  [facility];  Service: Vascular;  Laterality: N/A;  FLUORO: 13.12  DOSE 162mGy  CONTRAST: Isovue 350: 15ml    APPENDECTOMY      BRAIN TUMOR EXCISION      laser surgery     CARDIAC CATHETERIZATION N/A 06/21/2021    Procedure: LEFT HEART CATH;  Surgeon: Anjum Ceballos MD;  Location:  [facility];  Service: Cardiology;  Laterality: N/A;    CATARACT EXTRACTION W/ INTRAOCULAR LENS  IMPLANT, BILATERAL      CHOLECYSTECTOMY      COLONOSCOPY      ENDOSCOPY N/A 10/07/2022    Procedure: ESOPHAGOGASTRODUODENOSCOPY;  Surgeon: Stef Veras MD;  Location:  [facility];  Service: Gastroenterology;  Laterality: N/A;    EYE SURGERY      FEMORAL ARTERY STENT  10/2022    HYSTERECTOMY   "    INTERVENTIONAL RADIOLOGY PROCEDURE N/A 1/15/2023    Procedure: CV INTERVENTIONAL RADIOLOGY PROCEDURE;  Surgeon: Sanket Zapata MD;  Location:  [facility];  Service: Interventional Radiology;  Laterality: N/A;    TOE SURGERY      TRANS METATARSAL AMPUTATION Right 04/14/2021    Procedure: AMPUTATION TRANS METATARSAL RIGHT GREAT TOE;  Surgeon: Valdez Finney MD;  Location:  [facility] OR;  Service: Vascular;  Laterality: Right;       Family History: Her family history includes Coronary artery disease in her father; Diabetes in her father, maternal grandfather, maternal grandmother, mother, paternal grandfather, paternal grandmother, and sister; Heart attack in her father; Heart disease in her father.     Social History: She  reports that she has never smoked. She has never used smokeless tobacco. She reports that she does not currently use alcohol. She reports that she does not use drugs.    Home Medications:  Lancets, Pen Needles 3/16\", acetaminophen, apixaban, atorvastatin, bumetanide, clopidogrel, clotrimazole-betamethasone, cyclobenzaprine, gabapentin, glucose blood, guaiFENesin, hydrOXYzine, insulin glargine, insulin lispro, losartan, metoprolol succinate XL, minocycline, multivitamin with minerals, omeprazole, ondansetron ODT, polyethylene glycol, sennosides-docusate, sertraline, and traMADol    Current Medications:    hold    atorvastatin    senna-docusate sodium **AND** polyethylene glycol **AND** bisacodyl **AND** bisacodyl    bumetanide    clonazePAM    clopidogrel    DAPTOmycin    [START ON 4/20/2023] famotidine    insulin detemir    insulin lispro    losartan    metoprolol succinate XL    nitroglycerin    ondansetron    Pharmacy Consult - Pharmacy to dose    polyethylene glycol    sertraline    sodium chloride    traMADol     Allergies:  She is allergic to vancomycin, baclofen, cephalexin, erythromycin base, melatonin, oxycodone, protonix [pantoprazole], and sulfa antibiotics.    Review " "of Systems    IR Procedure pertinent significant findings are mentioned in the PMH and PSH above.    Objective     Visit Vitals  /86   Pulse 72   Temp 96.5 °F (35.8 °C) (Axillary)   Resp 16   Ht 172.7 cm (68\")   Wt 82.4 kg (181 lb 11.2 oz)   SpO2 93%   BMI 27.63 kg/m²        Physical Exam    A&Ox3.   Able to communicate  No Apparent Distress  Average physique   CVS: Regular rate and rhythm  Respiratory: Non labored breathing. No signs of respiratory compromise.    Result Review      I have personally reviewed the results from the time of this admission to 4/19/2023 12:09 EDT and agree with these findings.  [x]  Laboratory  []  Microbiology  [x]  Radiology  []  EKG/Telemetry   []  Cardiology/Vascular   []  Pathology  []  Old records  []  Other:    Most notable findings include: As noted:    Results from last 7 days   Lab Units 04/19/23  0840 04/19/23  0428 04/18/23  0349 04/17/23  1517   INR  1.58*  --   --   --    HEMOGLOBIN g/dL  --  10.6* 10.9* 11.7*   HEMATOCRIT %  --  34.9 36.3 39.3   PLATELETS 10*3/mm3  --  200 199 236       Estimated Creatinine Clearance: 33.9 mL/min (A) (by C-G formula based on SCr of 1.39 mg/dL (H)).   Creatinine   Date Value Ref Range Status   04/19/2023 1.39 (H) 0.57 - 1.00 mg/dL Final   04/18/2023 1.40 (H) 0.57 - 1.00 mg/dL Final   04/17/2023 1.63 (H) 0.57 - 1.00 mg/dL Final       COVID19   Date Value Ref Range Status   01/28/2023 Detected (C) Not Detected - Ref. Range Final        No results found for: PREGTESTUR, PREGSERUM, HCG, HCGQUANT     ASA SCALE ASSESSMENT (applicable ONLY if sedation planned):        MALLAMPATI CLASSIFICATION (applicable ONLY if sedation planned):       Assessment / Plan     Susan Anderson is a 83 y.o. female referred to the IR service with above problem.    Plan:   As above.    Notice: The note was created before the performance of the procedure. It might have been left in the pending status and signed off after the procedure. The time stamp on the note " may be misleading.    Abdirashid Jose MD   Vascular Interventional Radiology  04/19/23   12:09 PM EDT

## 2023-04-20 ENCOUNTER — APPOINTMENT (OUTPATIENT)
Dept: GENERAL RADIOLOGY | Facility: HOSPITAL | Age: 83
DRG: 682 | End: 2023-04-20
Payer: MEDICARE

## 2023-04-20 LAB
ALBUMIN SERPL-MCNC: 2.3 G/DL (ref 3.5–5.2)
ALBUMIN/GLOB SERPL: 1.1 G/DL
ALP SERPL-CCNC: 139 U/L (ref 39–117)
ALT SERPL W P-5'-P-CCNC: 11 U/L (ref 1–33)
ANION GAP SERPL CALCULATED.3IONS-SCNC: 7 MMOL/L (ref 5–15)
AST SERPL-CCNC: 21 U/L (ref 1–32)
BILIRUB SERPL-MCNC: 0.8 MG/DL (ref 0–1.2)
BUN SERPL-MCNC: 67 MG/DL (ref 8–23)
BUN/CREAT SERPL: 50.4 (ref 7–25)
CALCIUM SPEC-SCNC: 7.8 MG/DL (ref 8.6–10.5)
CHLORIDE SERPL-SCNC: 106 MMOL/L (ref 98–107)
CO2 SERPL-SCNC: 35 MMOL/L (ref 22–29)
CREAT SERPL-MCNC: 1.33 MG/DL (ref 0.57–1)
EGFRCR SERPLBLD CKD-EPI 2021: 39.5 ML/MIN/1.73
GLOBULIN UR ELPH-MCNC: 2.1 GM/DL
GLUCOSE BLDC GLUCOMTR-MCNC: 160 MG/DL (ref 70–130)
GLUCOSE BLDC GLUCOMTR-MCNC: 203 MG/DL (ref 70–130)
GLUCOSE BLDC GLUCOMTR-MCNC: 221 MG/DL (ref 70–130)
GLUCOSE SERPL-MCNC: 167 MG/DL (ref 65–99)
LDH FLD-CCNC: 71 U/L
LDH SERPL-CCNC: 313 U/L (ref 135–214)
POTASSIUM SERPL-SCNC: 3.8 MMOL/L (ref 3.5–5.2)
PROT FLD-MCNC: <1 G/DL
PROT SERPL-MCNC: 4.4 G/DL (ref 6–8.5)
REF LAB TEST METHOD: NORMAL
SODIUM SERPL-SCNC: 148 MMOL/L (ref 136–145)

## 2023-04-20 PROCEDURE — 63710000001 INSULIN LISPRO (HUMAN) PER 5 UNITS: Performed by: INTERNAL MEDICINE

## 2023-04-20 PROCEDURE — 80053 COMPREHEN METABOLIC PANEL: CPT | Performed by: HOSPITALIST

## 2023-04-20 PROCEDURE — 82962 GLUCOSE BLOOD TEST: CPT

## 2023-04-20 PROCEDURE — 25010000002 PIPERACILLIN SOD-TAZOBACTAM PER 1 G: Performed by: HOSPITALIST

## 2023-04-20 PROCEDURE — 99232 SBSQ HOSP IP/OBS MODERATE 35: CPT | Performed by: INTERNAL MEDICINE

## 2023-04-20 PROCEDURE — 71045 X-RAY EXAM CHEST 1 VIEW: CPT

## 2023-04-20 PROCEDURE — 63710000001 INSULIN DETEMIR PER 5 UNITS: Performed by: HOSPITALIST

## 2023-04-20 PROCEDURE — 99232 SBSQ HOSP IP/OBS MODERATE 35: CPT | Performed by: HOSPITALIST

## 2023-04-20 PROCEDURE — 83615 LACTATE (LD) (LDH) ENZYME: CPT | Performed by: HOSPITALIST

## 2023-04-20 PROCEDURE — 94799 UNLISTED PULMONARY SVC/PX: CPT

## 2023-04-20 RX ADMIN — INSULIN DETEMIR 5 UNITS: 100 INJECTION, SOLUTION SUBCUTANEOUS at 08:10

## 2023-04-20 RX ADMIN — METOPROLOL SUCCINATE 25 MG: 25 TABLET, EXTENDED RELEASE ORAL at 08:11

## 2023-04-20 RX ADMIN — SENNOSIDES AND DOCUSATE SODIUM 2 TABLET: 8.6; 5 TABLET ORAL at 20:21

## 2023-04-20 RX ADMIN — INSULIN LISPRO 3 UNITS: 100 INJECTION, SOLUTION INTRAVENOUS; SUBCUTANEOUS at 11:55

## 2023-04-20 RX ADMIN — SERTRALINE HYDROCHLORIDE 50 MG: 50 TABLET ORAL at 08:33

## 2023-04-20 RX ADMIN — BUMETANIDE 1 MG: 0.25 INJECTION, SOLUTION INTRAMUSCULAR; INTRAVENOUS at 20:22

## 2023-04-20 RX ADMIN — TAZOBACTAM SODIUM AND PIPERACILLIN SODIUM 3.38 G: 375; 3 INJECTION, SOLUTION INTRAVENOUS at 06:44

## 2023-04-20 RX ADMIN — ATORVASTATIN CALCIUM 40 MG: 40 TABLET, FILM COATED ORAL at 20:21

## 2023-04-20 RX ADMIN — FAMOTIDINE 20 MG: 20 TABLET, FILM COATED ORAL at 08:11

## 2023-04-20 RX ADMIN — Medication 10 ML: at 20:22

## 2023-04-20 RX ADMIN — APIXABAN 2.5 MG: 2.5 TABLET, FILM COATED ORAL at 13:46

## 2023-04-20 RX ADMIN — INSULIN LISPRO 3 UNITS: 100 INJECTION, SOLUTION INTRAVENOUS; SUBCUTANEOUS at 17:03

## 2023-04-20 RX ADMIN — APIXABAN 2.5 MG: 2.5 TABLET, FILM COATED ORAL at 20:21

## 2023-04-20 RX ADMIN — CLOPIDOGREL BISULFATE 75 MG: 75 TABLET ORAL at 08:44

## 2023-04-20 RX ADMIN — LOSARTAN POTASSIUM 50 MG: 50 TABLET, FILM COATED ORAL at 08:11

## 2023-04-20 RX ADMIN — INSULIN LISPRO 2 UNITS: 100 INJECTION, SOLUTION INTRAVENOUS; SUBCUTANEOUS at 08:10

## 2023-04-20 RX ADMIN — BUMETANIDE 1 MG: 0.25 INJECTION, SOLUTION INTRAMUSCULAR; INTRAVENOUS at 08:12

## 2023-04-20 NOTE — PLAN OF CARE
Problem: Adult Inpatient Plan of Care  Goal: Plan of Care Review  Outcome: Ongoing, Progressing  Goal: Patient-Specific Goal (Individualized)  Outcome: Ongoing, Progressing  Goal: Absence of Hospital-Acquired Illness or Injury  Outcome: Ongoing, Progressing  Intervention: Identify and Manage Fall Risk  Recent Flowsheet Documentation  Taken 4/19/2023 2000 by Darlene Stark RN  Safety Promotion/Fall Prevention:   activity supervised   assistive device/personal items within reach   clutter free environment maintained   fall prevention program maintained   safety round/check completed   room organization consistent  Intervention: Prevent Skin Injury  Recent Flowsheet Documentation  Taken 4/19/2023 2000 by Darlene Stark RN  Body Position: weight shifting  Skin Protection: incontinence pads utilized  Intervention: Prevent and Manage VTE (Venous Thromboembolism) Risk  Recent Flowsheet Documentation  Taken 4/19/2023 2000 by Darlene Stark RN  Activity Management: activity encouraged  Range of Motion: active ROM (range of motion) encouraged  Intervention: Prevent Infection  Recent Flowsheet Documentation  Taken 4/19/2023 2000 by Darlene Stark RN  Infection Prevention: rest/sleep promoted  Goal: Optimal Comfort and Wellbeing  Outcome: Ongoing, Progressing  Intervention: Provide Person-Centered Care  Recent Flowsheet Documentation  Taken 4/19/2023 2000 by Darlene Stark RN  Trust Relationship/Rapport:   care explained   questions answered  Goal: Readiness for Transition of Care  Outcome: Ongoing, Progressing     Problem: Fall Injury Risk  Goal: Absence of Fall and Fall-Related Injury  Outcome: Ongoing, Progressing  Intervention: Identify and Manage Contributors  Recent Flowsheet Documentation  Taken 4/19/2023 2000 by Darlene Stark RN  Medication Review/Management: medications reviewed  Intervention: Promote Injury-Free Environment  Recent Flowsheet Documentation  Taken 4/19/2023 2000 by Darlene Stark RN  Safety  Promotion/Fall Prevention:   activity supervised   assistive device/personal items within reach   clutter free environment maintained   fall prevention program maintained   safety round/check completed   room organization consistent     Problem: Diabetes Comorbidity  Goal: Blood Glucose Level Within Targeted Range  Outcome: Ongoing, Progressing     Problem: Heart Failure Comorbidity  Goal: Maintenance of Heart Failure Symptom Control  Outcome: Ongoing, Progressing  Intervention: Maintain Heart Failure-Management  Recent Flowsheet Documentation  Taken 4/19/2023 2000 by Darlene Stark RN  Medication Review/Management: medications reviewed     Problem: Hypertension Comorbidity  Goal: Blood Pressure in Desired Range  Outcome: Ongoing, Progressing  Intervention: Maintain Blood Pressure Management  Recent Flowsheet Documentation  Taken 4/19/2023 2000 by Darlene Stark RN  Medication Review/Management: medications reviewed     Problem: Osteoarthritis Comorbidity  Goal: Maintenance of Osteoarthritis Symptom Control  Outcome: Ongoing, Progressing  Intervention: Maintain Osteoarthritis Symptom Control  Recent Flowsheet Documentation  Taken 4/19/2023 2000 by Darlene Stark RN  Activity Management: activity encouraged  Medication Review/Management: medications reviewed     Problem: Pain Chronic (Persistent) (Comorbidity Management)  Goal: Acceptable Pain Control and Functional Ability  Outcome: Ongoing, Progressing  Intervention: Manage Persistent Pain  Recent Flowsheet Documentation  Taken 4/19/2023 2000 by Darlene Stark RN  Medication Review/Management: medications reviewed  Intervention: Optimize Psychosocial Wellbeing  Recent Flowsheet Documentation  Taken 4/19/2023 2000 by Darlene Stark RN  Supportive Measures: active listening utilized  Diversional Activities: television     Problem: Skin Injury Risk Increased  Goal: Skin Health and Integrity  Outcome: Ongoing, Progressing  Intervention: Optimize Skin  Protection  Recent Flowsheet Documentation  Taken 4/19/2023 2000 by Darlene Stark, RN  Pressure Reduction Techniques:   frequent weight shift encouraged   weight shift assistance provided   heels elevated off bed  Head of Bed (HOB) Positioning: HOB elevated  Pressure Reduction Devices:   foam padding utilized   pressure-redistributing mattress utilized  Skin Protection: incontinence pads utilized   Goal Outcome Evaluation:

## 2023-04-20 NOTE — PROGRESS NOTES
[facility] Cardiothoracic Surgery In-Patient Progress Note     LOS: 3 days     Chief Complaint: PAD/BLE nonhealing ulcerations    Subjective  No acute events overnight.      Objective  Vital Signs  Temp:  [97.6 °F (36.4 °C)-98.2 °F (36.8 °C)] 97.6 °F (36.4 °C)  Heart Rate:  [57-79] 73  Resp:  [18] 18  BP: (123-165)/(61-82) 125/65      Physical Exam:   General Appearance: alert, appears stated age and cooperative   Lungs: respirations regular, respirations even and respirations unlabored   Heart: normal S1, S2, no murmur, no gallop, no rub and no click     Results     Results from last 7 days   Lab Units 04/19/23  0428   WBC 10*3/mm3 7.25   HEMOGLOBIN g/dL 10.6*   HEMATOCRIT % 34.9   PLATELETS 10*3/mm3 200     Results from last 7 days   Lab Units 04/20/23  0451   SODIUM mmol/L 148*   POTASSIUM mmol/L 3.8   CHLORIDE mmol/L 106   CO2 mmol/L 35.0*   BUN mg/dL 67*   CREATININE mg/dL 1.33*   GLUCOSE mg/dL 167*   CALCIUM mg/dL 7.8*     Imaging Results (Last 24 Hours)     Procedure Component Value Units Date/Time    XR Chest 1 View [[id number]] Collected: 04/20/23 0835     Updated: 04/20/23 0900    Narrative:      XR CHEST 1 VW    Date of Exam: 4/20/2023 8:12 AM EDT    Indication: pulmonary edema.    Comparison: April 19, 2023    Findings:    There are small right and moderate left pleural effusions with bibasilar opacities. The heart and mediastinal contours appear normal. The pulmonary vasculature appears normal. There are degenerative changes along the left shoulder.      Impression:      Impression:  1. Small right and moderate left pleural effusions.  2. Bibasal opacities, which could reflect atelectasis or pneumonia.    Electronically Signed: Jah Champion    4/20/2023 8:57 AM EDT    Workstation ID: [id number]    XR Chest 1 View [[id number]] Collected: 04/19/23 1539     Updated: 04/19/23 1543    Narrative:      XR CHEST 1 VW    Date of Exam: 4/19/2023 3:08 PM EDT    Indication: Dyspnea.    Comparison:  4/19/2023    Findings:  The heart appears enlarged. There is indistinctness of the pulmonary vasculature. Small to moderate-sized bilateral pleural effusions are present. No pneumothorax. Patchy airspace disease seen within the lung bases bilaterally. No acute osseous   abnormality identified.      Impression:      Impression:  New/worsening bibasilar airspace disease with small to moderate-sized bilateral pleural effusions with underlying mild pulmonary edema pattern.      Electronically Signed: Karen Rey    4/19/2023 3:40 PM EDT    Workstation ID: [id number]    US Thoracentesis [[id number]] Collected: 04/19/23 1353    Specimen: Body Fluid Updated: 04/19/23 1352    Narrative:      US THORACENTESIS                                                            History: US THORACENTESIS                                                    : Abdirashid Jose MD.    Assistant:  None.                                                                                  Modality: Ultrasound                                                                                                               Sedation: None.     Anesthesia: Lidocaine local infiltration..              Estimated blood loss:  < 5 cc.             Technique:   Discussion of risks, benefits, and alternatives were made with the patient. The patient expressed understanding and agreed to proceed. Informed written consent was obtained. A universal timeout was performed prior to starting the procedure. Maximal   sterile precautions were utilized. The procedure room personnel used personal protective equipment. The operators additionally used sterile surgical gloves.    A preliminary ultrasonography was performed. It showed a pleural effusion. A low intercostal access site was selected for access. Pertinent ultrasound images were stored to the PACS for documentation. The patient position was optimized for the   performance of thoracentesis. The access site  was sterilely prepped and draped. Local anesthesia was administered. A catheter over the needle system was advanced into the pleura. Straw colored fluid was aspirated and the plastic catheter advanced into   the pleural space. The catheter was then connected to a fluid recovery system.     Endpoint of thoracentesis: Chest pain    At the end of the procedure, the catheter was withdrawn and an aseptic dressing applied. The patient tolerated the procedure well.     Postprocedure chest x-ray showed no pneumothorax.    After uneventful recovery recovery, the patient was discharged from the department in stable condition.               Complications: None immediate.    Specimen: Sent to the lab.                                                                    Impression:      Impression:     Successful ultrasound-guided thoracentesis of right pleural effusion with recovery of 1.35 liters of pleural fluid.                                                               Thank you for the opportunity to assist in the care of your patient.        Electronically Signed: Abdirashid Jose    4/19/2023 1:55 PM EDT    Workstation ID: [id number]        HOMER: Interpretation Summary       •  Right Conclusion: The right HOMER is unable to be assessed due to vessel incompressibility. Unable to assess digital ischemia.  Waveforms are difficult to assess due to A-fib and PVCs.  Reported palpable pulses.  •  Left Conclusion: The left HOMER is unable to be assessed due to vessel incompressibility.  Possible severe digital ischemia. Waveforms are difficult to assess due to A-fib and PVCs.  Reported palpable pulses.  •  Findings overall are inconclusive.  Alternative evaluation methods such as an arterial duplex ultrasound or CT angiogram with runoff could be considered if clinically warranted.      Assessment    Cellulitis of right foot due to methicillin-resistant Staphylococcus aureus    Diabetic foot ulcer    PVD (peripheral vascular disease)  (HCC)    Diabetes mellitus with neuropathy    Essential hypertension    Stage 3a chronic kidney disease    Acute on chronic systolic CHF (congestive heart failure)    Mitral valve disease    NICM (nonischemic cardiomyopathy) (HCC)    Dyslipidemia    Chronic combined systolic and diastolic heart failure     Paroxysmal atrial fibrillation    T2DM     Acute respiratory failure with hypoxia and hypercarbia      Plan   -ABIs noted above  -No plan for surgical intervention during this admission  -Continue with medical optimization and diuresis  -Follow-up in 1 to 2 weeks in office after discharge      INDIGO Nation  04/20/23  13:33 EDT

## 2023-04-20 NOTE — PROGRESS NOTES
INFECTIOUS DISEASE Progress Note    Susan Anderson  1940  [id number]    Admission Date: 4/17/2023      Requesting Provider: No ref. provider found  Evaluating Physician: Stef Pete MD    Reason for Consultation: Bilateral foot infections    History of present illness:    4/18/23: Patient is a 83 y.o. female With type 2 diabetes mellitus, stage III chronic kidney disease, coronary artery disease, systolic/diastolic congestive heart failure, Severe peripheral vascular disease, recurrent bilateral foot infections, recent bilateral heel decubitus ulcers, a recent MRSA right toe wound infection, and recent Covid 19 in late January 2023 who is seen today after she presented with dyspnea and fatigue, and malaise. I saw her recently in the office with bilateral heel decubitus ulcers and a right fourth toe ulcer with associated cellulitis.  I initially started her on Augmentin therapy but then switched to minocycline after a right fourth toe culture returned positive for MRSA. I followed her during an admission from 1/3 until 1/20/23 for bibasilar pneumonia, Klebsiella/E. Coli UTI, and PEA arrest with acute hypoxic respiratory failure.  She suffered from acute renal failure requiring dialysis but her dialysis has subsequently been discontinued.  She was readmitted from 1/28 to 2/2/23 for Covid 19 infection for which she received Decadron but was not able to receive remdesivir due to bradycardia.  She was readmitted last night with dyspnea and acute hypoxic respiratory failure.  She has required BiPAP and is currently on 30% FiO2 with an O2 saturation of 98%.  She is lethargic and unable to provide any history. Chest CT scan revealed moderate to large bilateral pleural effusions and bibasilar atelectasis with cardiomegaly.  She is undergoing diuresis.She has been started on intravenous daptomycin.  She has remained afebrile.Her pro-BNP yesterday was 19,401. Her creatinine was 1.63.Her white blood cell count  was 8.1.Her creatinine today is 1.4 and her white blood cell count is 6.4.     4/19/23:She remains afebrile.She is on 3 L of oxygen with an O2 saturation of 93%. X-ray today reveals right greater than left effusions/opacities.  She is scheduled to undergo right thoracentesis today.  She has decreased dyspnea.  She has some nausea but denies vomiting.    4/20/23:She remains afebrile.  Her creatinine is 1.33. Her O2 saturation is 94% on 3 L. She underwent ultrasound-guided thoracentesis of the right pleural effusion yesterday yielding 1.35 L of pleural fluid. She had increased dyspnea immediately after her thoracentesis but this has dramatically improved.  Her chest x-ray shortly after thoracentesis revealed marked improvement but then she had evidence of bilateral congestion consistent with pulmonary edema. She transiently received Zosyn yesterday due to concerns over possible aspiration pneumonia but this has been discontinued by Dr. Felder.      Past Medical History:   Diagnosis Date   • Anemia 8/22   • Anxiety    • Arthritis    • Asthma 6/4 - double pneumonia    Currently on inhaler and nebulizer   • Atrial fibrillation 08/21/2022   • Brain tumor     Benign and followed at    • Cancer     cervical cancer, skin cancer   • CHF (congestive heart failure) 06/04/2021   • Chronic kidney disease Related to diabetes   • Clotting disorder 10/22    Upper GI bleed from blood thinners aggravate ulcers   • Coronary artery disease 6/4/2021 DX for hear failure   • COVID-19 01/28/2023   • Depression 8/22   • Diabetes mellitus 30 years    Seeing Dr. Lam 1st time Aug 19   • Dizziness 07/27/2022   • Effusion of right knee 08/12/2022   • Fall 07/27/2022   • GERD (gastroesophageal reflux disease)    • Gout    • History of medical problems 8/22    AFIB   • History of staph infection 10/2021    right toe   • History of transfusion     no reactions, 1 unit, [facility]   • HL (hearing loss)     Acoustic neuroma right   • Hx of colonoscopy     • Hyperlipidemia Reference current labs x 2-3yrs approx   • Hypertension 30 years   • Hypomagnesemia 10/06/2022   • Insomnia    • Low back pain    • Migraine    • Mitral valve disease    • Mitral valve disease    • Osteomyelitis    • Peptic ulceration 10/22    Secondary to blood thinners   • Peripheral neuropathy    • Physical deconditioning 05/17/2022   • Pneumonia due to infectious organism 06/04/2021   • Pressure injury of skin of sacral region 08/21/2022   • Renal insufficiency 2021   • Sepsis 01/28/2023   • Sleep apnea    • Syncope, unspecified syncope type 10/06/2022   • Type 2 diabetes mellitus     30 years   • Weakness 07/27/2022       Past Surgical History:   Procedure Laterality Date   • ABDOMINAL HYSTERECTOMY W/SALPINGECTOMY     • AMPUTATION  Right great toe, 1st 1/3 metatarsal -[place] 4/14/21   • AORTAGRAM N/A 04/09/2021    Procedure: AORTAGRAM WITH OR WITHOUT RUNOFFS, WITH Co2;  Surgeon: Trey Forrest MD;  Location:  [facility];  Service: Vascular;  Laterality: N/A;   • AORTAGRAM N/A 09/28/2022    Procedure: CO2 ANGIOGRAM, LEFT SFA ANGIOPLASTY WITH DRUG ELUTING BALLOON, LEFT SFA STENT PLACEMENT ;  Surgeon: Trey Forrest MD;  Location:  [facility];  Service: Vascular;  Laterality: N/A;  FLUORO: 13.12  DOSE 162mGy  CONTRAST: Isovue 350: 15ml   • APPENDECTOMY     • BRAIN TUMOR EXCISION      laser surgery    • CARDIAC CATHETERIZATION N/A 06/21/2021    Procedure: LEFT HEART CATH;  Surgeon: Anjum Ceballos MD;  Location:  [facility];  Service: Cardiology;  Laterality: N/A;   • CATARACT EXTRACTION W/ INTRAOCULAR LENS  IMPLANT, BILATERAL     • CHOLECYSTECTOMY     • COLONOSCOPY     • ENDOSCOPY N/A 10/07/2022    Procedure: ESOPHAGOGASTRODUODENOSCOPY;  Surgeon: Stef Veras MD;  Location:  [facility];  Service: Gastroenterology;  Laterality: N/A;   • EYE SURGERY     • FEMORAL ARTERY STENT  10/2022   • HYSTERECTOMY     • INTERVENTIONAL RADIOLOGY PROCEDURE N/A 1/15/2023     Procedure: CV INTERVENTIONAL RADIOLOGY PROCEDURE;  Surgeon: Sanket Zapata MD;  Location:  [facility];  Service: Interventional Radiology;  Laterality: N/A;   • TOE SURGERY     • TRANS METATARSAL AMPUTATION Right 04/14/2021    Procedure: AMPUTATION TRANS METATARSAL RIGHT GREAT TOE;  Surgeon: Valdez Finney MD;  Location:  [facility];  Service: Vascular;  Laterality: Right;       Family History   Problem Relation Age of Onset   • Diabetes Mother         Type II   • Heart disease Father    • Heart attack Father    • Coronary artery disease Father    • Diabetes Father         Type II   • Diabetes Sister    • Diabetes Maternal Grandmother    • Diabetes Maternal Grandfather    • Diabetes Paternal Grandmother    • Diabetes Paternal Grandfather        Social History     Socioeconomic History   • Marital status:    • Number of children: 1   Tobacco Use   • Smoking status: Never   • Smokeless tobacco: Never   Vaping Use   • Vaping Use: Never used   Substance and Sexual Activity   • Alcohol use: Not Currently     Comment: Social drinking when not recovering from hospitalization   • Drug use: Never   • Sexual activity: Not Currently     Birth control/protection: None       Allergies   Allergen Reactions   • Vancomycin Other (See Comments)     Acute Kidney Injury, requested by ID   • Baclofen Other (See Comments) and Hallucinations     PSYCHOSIS-POA REFUSES ADMINISTRATION OF THIS MED.   • Cephalexin Nausea Only   • Erythromycin Base Nausea Only   • Melatonin Other (See Comments)     Nightmares   • Oxycodone Nausea Only   • Protonix [Pantoprazole] Itching and Rash   • Sulfa Antibiotics Nausea Only         Medication:    Current Facility-Administered Medications:   •  atorvastatin (LIPITOR) tablet 40 mg, 40 mg, Oral, Nightly, Faiza Angulo MD, 40 mg at 04/19/23 2042  •  sennosides-docusate (PERICOLACE) 8.6-50 MG per tablet 2 tablet, 2 tablet, Oral, BID, 2 tablet at 04/18/23 2052 **AND** polyethylene  glycol (MIRALAX) packet 17 g, 17 g, Oral, Daily PRN **AND** bisacodyl (DULCOLAX) EC tablet 5 mg, 5 mg, Oral, Daily PRN **AND** bisacodyl (DULCOLAX) suppository 10 mg, 10 mg, Rectal, Daily PRN, Faiza Angulo MD  •  bumetanide (BUMEX) injection 1 mg, 1 mg, Intravenous, Q12H, Anjum Ceballos MD, 1 mg at 04/19/23 2042  •  clonazePAM (KlonoPIN) tablet 0.5 mg, 0.5 mg, Oral, BID PRN, Faiza Angulo MD, 0.5 mg at 04/17/23 2154  •  clopidogrel (PLAVIX) tablet 75 mg, 75 mg, Oral, Daily, Faiza Angulo MD, 75 mg at 04/18/23 0932  •  DAPTOmycin (CUBICIN) 300 mg in sodium chloride 0.9 % 50 mL IVPB, 4 mg/kg (Adjusted), Intravenous, Q48H, Faiza Angulo MD, Last Rate: 100 mL/hr at 04/19/23 1722, 300 mg at 04/19/23 1722  •  famotidine (PEPCID) tablet 20 mg, 20 mg, Oral, Daily, Breezy Felder MD  •  insulin detemir (LEVEMIR) injection 5 Units, 5 Units, Subcutaneous, Daily, Breezy Felder MD, 5 Units at 04/19/23 0926  •  Insulin Lispro (humaLOG) injection 0-7 Units, 0-7 Units, Subcutaneous, TID With Meals, Faiza Angulo MD, 4 Units at 04/19/23 1721  •  losartan (COZAAR) tablet 50 mg, 50 mg, Oral, Q24H, Anjum Ceballos MD, 50 mg at 04/19/23 1023  •  metoprolol succinate XL (TOPROL-XL) 24 hr tablet 25 mg, 25 mg, Oral, Q24H, Anjum Ceballos MD, 25 mg at 04/19/23 1023  •  nitroglycerin (NITROSTAT) ointment 0.5 inch, 0.5 inch, Topical, Q6H PRN, Faiza Angulo MD  •  ondansetron (ZOFRAN) injection 4 mg, 4 mg, Intravenous, Q6H PRN, Breezy Felder MD, 4 mg at 04/19/23 1237  •  Pharmacy Consult - Pharmacy to dose, , Does not apply, Continuous PRN, Faiza Angulo MD  •  piperacillin-tazobactam (ZOSYN) 3.375 g in iso-osmotic dextrose 50 ml (premix), 3.375 g, Intravenous, Q8H, Breezy Felder MD, 3.375 g at 04/20/23 0644  •  polyethylene glycol (MIRALAX) packet 17 g, 17 g, Oral, Daily, Faiza Angulo MD, 17 g at 04/18/23 0932  •  sertraline (ZOLOFT) tablet 50 mg, 50 mg, Oral, Daily, Faiza Angulo MD, 50 mg at  23 1023  •  sodium chloride 0.9 % flush 10 mL, 10 mL, Intravenous, PRN, Faiza Angulo MD, 10 mL at 23 2053  •  traMADol (ULTRAM) tablet 25 mg, 25 mg, Oral, Q12H PRN, Faiza Angulo MD, 25 mg at 23 0937    Antibiotics:  Anti-Infectives (From admission, onward)    Ordered     Dose/Rate Route Frequency Start Stop    23 1545  piperacillin-tazobactam (ZOSYN) 3.375 g in iso-osmotic dextrose 50 ml (premix)        Ordering Provider: Breezy Felder MD    3.375 g  over 4 Hours Intravenous Every 8 Hours 23 2300 23 2259    23 2112  DAPTOmycin (CUBICIN) 300 mg in sodium chloride 0.9 % 50 mL IVPB        Ordering Provider: Faiza Angulo MD    4 mg/kg × 69.2 kg (Adjusted)  100 mL/hr over 30 Minutes Intravenous Every 48 Hours 23 1800 23 1759    23 1545  piperacillin-tazobactam (ZOSYN) 3.375 g in iso-osmotic dextrose 50 ml (premix)        Ordering Provider: Breezy Felder MD    3.375 g  over 30 Minutes Intravenous Once 23 1645 23 1752    23 1542  DAPTOmycin (CUBICIN) 300 mg in sodium chloride 0.9 % 50 mL IVPB        Note to Pharmacy: MRSA w/ restriction from [place] per ID.   Ordering Provider: Kings Herrera MD    300 mg  100 mL/hr over 30 Minutes Intravenous Once 23 1600 23 1801            Review of Systems:  See HPI    Physical Exam:   Vital Signs  Temp (24hrs), Av °F (36.7 °C), Min:97.6 °F (36.4 °C), Max:98.2 °F (36.8 °C)    Temp  Min: 97.6 °F (36.4 °C)  Max: 98.2 °F (36.8 °C)  BP  Min: 123/65  Max: 165/82  Pulse  Min: 57  Max: 79  Resp  Min: 18  Max: 21  SpO2  Min: 87 %  Max: 100 %    GENERAL: She remains much more alert and responsive.  HEENT: No labial ulcers    NECK: Supple   HEART: RRR; No murmur, rubs, gallops.   LUNGS: .Decreased breath sounds at the right base with crackles.  ABDOMEN: Soft, nontender, nondistended.  No rebound or guarding. NO mass or HSM.  EXT:  1-2+ bilateral lower extremity edema.  She has  bilateral 1.5 cm heel pressure ulcers.  The right-sided ulcer has decreased surrounding erythema today.  These are non-stageable but may be stage III.  She has a right lateral fourth toe ulcer which is 0..75 x .4 cm  with some improvement in erythema and central slough.  This appears to be 3 mm deep.  MSK: No joint effusions or erythema  SKIN: No diffuse rash present.    NEURO: She is more alert and arousable.    Laboratory Data    Results from last 7 days   Lab Units 04/19/23  0428 04/18/23  0349 04/17/23  1517   WBC 10*3/mm3 7.25 6.37 8.08   HEMOGLOBIN g/dL 10.6* 10.9* 11.7*   HEMATOCRIT % 34.9 36.3 39.3   PLATELETS 10*3/mm3 200 199 236     Results from last 7 days   Lab Units 04/20/23  0451   SODIUM mmol/L 148*   POTASSIUM mmol/L 3.8   CHLORIDE mmol/L 106   CO2 mmol/L 35.0*   BUN mg/dL 67*   CREATININE mg/dL 1.33*   GLUCOSE mg/dL 167*   CALCIUM mg/dL 7.8*     Results from last 7 days   Lab Units 04/20/23  0451   ALK PHOS U/L 139*   BILIRUBIN mg/dL 0.8   ALT (SGPT) U/L 11   AST (SGOT) U/L 21         Results from last 7 days   Lab Units 04/17/23  1517   CRP mg/dL 8.02*     Results from last 7 days   Lab Units 04/17/23  1517   LACTATE mmol/L 1.1     Results from last 7 days   Lab Units 04/17/23  1517   CK TOTAL U/L 106         Estimated Creatinine Clearance: 34.4 mL/min (A) (by C-G formula based on SCr of 1.33 mg/dL (H)).      Microbiology:  No results found for: ACANTHNAEG, AFBCX, BPERTUSSISCX, BLOODCX  No results found for: BCIDPCR, CXREFLEX, CSFCX, CULTURETIS  No results found for: CULTURES, HSVCX, URCX  No results found for: EYECULTURE, GCCX, HSVCULTURE, LABHSV  No results found for: LEGIONELLA, MRSACX, MUMPSCX, MYCOPLASCX  No results found for: NOCARDIACX, STOOLCX  No results found for: THROATCX, UNSTIMCULT, URINECX, CULTURE, VZVCULTUR  Wound Culture   Date Value Ref Range Status   04/12/2023 Moderate growth (3+) Staphylococcus aureus, MRSA (A)  Final     Comment:       Methicillin resistant Staphylococcus  aureus, Patient may be an isolation risk.           Radiology:  Imaging Results (Last 72 Hours)     Procedure Component Value Units Date/Time    XR Chest 1 View [[id number]] Collected: 04/19/23 1539     Updated: 04/19/23 1543    Narrative:      XR CHEST 1 VW    Date of Exam: 4/19/2023 3:08 PM EDT    Indication: Dyspnea.    Comparison: 4/19/2023    Findings:  The heart appears enlarged. There is indistinctness of the pulmonary vasculature. Small to moderate-sized bilateral pleural effusions are present. No pneumothorax. Patchy airspace disease seen within the lung bases bilaterally. No acute osseous   abnormality identified.      Impression:      Impression:  New/worsening bibasilar airspace disease with small to moderate-sized bilateral pleural effusions with underlying mild pulmonary edema pattern.      Electronically Signed: Karen Rey    4/19/2023 3:40 PM EDT    Workstation ID: [id number]    US Thoracentesis [[id number]] Collected: 04/19/23 1353    Specimen: Body Fluid Updated: 04/19/23 1358    Narrative:      US THORACENTESIS                                                            History: US THORACENTESIS                                                    : Abdirashid Jose MD.    Assistant:  None.                                                                                  Modality: Ultrasound                                                                                                               Sedation: None.     Anesthesia: Lidocaine local infiltration..              Estimated blood loss:  < 5 cc.             Technique:   Discussion of risks, benefits, and alternatives were made with the patient. The patient expressed understanding and agreed to proceed. Informed written consent was obtained. A universal timeout was performed prior to starting the procedure. Maximal   sterile precautions were utilized. The procedure room personnel used personal protective equipment. The operators  additionally used sterile surgical gloves.    A preliminary ultrasonography was performed. It showed a pleural effusion. A low intercostal access site was selected for access. Pertinent ultrasound images were stored to the PACS for documentation. The patient position was optimized for the   performance of thoracentesis. The access site was sterilely prepped and draped. Local anesthesia was administered. A catheter over the needle system was advanced into the pleura. Straw colored fluid was aspirated and the plastic catheter advanced into   the pleural space. The catheter was then connected to a fluid recovery system.     Endpoint of thoracentesis: Chest pain    At the end of the procedure, the catheter was withdrawn and an aseptic dressing applied. The patient tolerated the procedure well.     Postprocedure chest x-ray showed no pneumothorax.    After uneventful recovery recovery, the patient was discharged from the department in stable condition.               Complications: None immediate.    Specimen: Sent to the lab.                                                                    Impression:      Impression:     Successful ultrasound-guided thoracentesis of right pleural effusion with recovery of 1.35 liters of pleural fluid.                                                               Thank you for the opportunity to assist in the care of your patient.        Electronically Signed: Abdirashid Jose    4/19/2023 1:55 PM EDT    Workstation ID: [id number]    XR Chest 1 View [[id number]] Collected: 04/19/23 1255     Updated: 04/19/23 1301    Narrative:      XR CHEST 1 VW    Date of Exam: 4/19/2023 12:18 PM EDT    Indication: right sided thoracentesis.    Comparison: April 19, 2023    Findings:  The heart looks enlarged. There is an improved appearance of the right basilar area. There is some haziness at the right base that could reflect small right pleural effusion and/or some underlying atelectasis. There remains  density in the left lower   chest which could relate to pleural effusion and underlying atelectasis. There is no definite pneumothorax.      Impression:      Impression:  1.Improved appearance to the right basilar area that could relate to interval thoracentesis. There as probably some residual effusion and atelectasis at the right base.  2.Left pleural effusion and atelectasis is again noted.  3.Cardiomegaly      Electronically Signed: Fam Hunt MD    4/19/2023 12:58 PM EDT    Workstation ID: [id number]    XR Chest 1 View [[id number]] Collected: 04/19/23 0739     Updated: 04/19/23 0742    Narrative:      XR CHEST 1 VW    Date of Exam: 4/19/2023 3:30 AM EDT    Indication: DYSPNEA, ON EXERTION    Comparison: CT chest 4/17/2023. Chest radiograph 4/17/2023.    Findings:  Stable moderate right and small left pleural effusions with overlying airspace disease/atelectasis. No pneumothorax or new lung opacity. Cardiac silhouette is partially obscured, but appears unchanged. No acute osseous abnormalities.      Impression:      Impression:  Stable bibasilar airspace disease/atelectasis overlying moderate right and small left pleural effusions.    Electronically Signed: Jerry Duron    4/19/2023 7:39 AM EDT    Workstation ID: [id number]    CT Chest Without Contrast Diagnostic [[id number]] Collected: 04/17/23 2005     Updated: 04/17/23 2013    Narrative:      CT CHEST WO CONTRAST DIAGNOSTIC    Date of Exam: 4/17/2023 7:49 PM EDT    Indication: Pneumonia, complication suspected, xray done.    Comparison: 4/17/2023, 1/20/2023    Technique: Axial CT images were obtained of the chest without contrast administration.  Reconstructed coronal and sagittal images were also obtained. Automated exposure control and iterative construction methods were used.    Findings:  Hilum and Mediastinum: No pathologically enlarged lymph nodes. The heart appears enlarged. No pericardial effusion.  Unremarkable thoracic aorta and pulmonary  arteries.    Lung Parenchyma and Pleura: Large bilateral pleural effusions are present. Intralobular septal thickening is present. Atelectatic changes are present within the bilateral lower lobes. No definite consolidation identified though evaluation is limited due   to significant atelectasis. Atherosclerotic calcifications are present including within the pulmonary arteries. Granulomatous calcifications are present.    Upper Abdomen: No acute process.     Soft tissues: Unremarkable.    Osseous structures: No aggressive focal lytic or sclerotic osseous lesions.      Impression:      Impression:    1. At least moderate pulmonary edema pattern with large bilateral pleural effusions and bibasilar atelectasis. There is cardiomegaly. No definite consolidation identified though significant atelectatic changes are present. Superimposed pneumonia cannot   be excluded.  2. Ancillary findings as described above.    Electronically Signed: Karen Rey    4/17/2023 8:10 PM EDT    Workstation ID: [id number]    XR Chest 1 View [[id number]] Collected: 04/17/23 1609     Updated: 04/17/23 1614    Narrative:      XR CHEST 1 VW    Date of Exam: 4/17/2023 3:46 PM EDT    Indication: Weak/Dizzy/AMS triage protocol.    Comparison: January 28, 2023    FINDINGS:  There is a suspected moderate right and a small left pleural effusion. There are basilar predominant opacities with prominent pulmonary vasculature. The heart appears enlarged, as before. Right IJ catheter is no longer seen.  No pneumothorax is   identified.      Impression:      1.Moderate right and small left pleural effusions.  2.Basilar predominant opacities with cardiomegaly and prominent pulmonary vasculature, suspicious for edema and atelectasis.       Electronically Signed: Billy Hadley    4/17/2023 4:11 PM EDT    Workstation ID: [id number]      I read her radiographic studies.      Impression:   1.  MRSA right foot wound infection-with cellulitis of the right fourth toe and  bilateral decubitus heel ulcers.  This has occurred in the setting of severe peripheral vascular disease and severe debility.  These will be very difficult to heal.  She is currently on daptomycin therapy.   2.  Pulmonary edema-She had flash pulmonary edema after her thoracentesis with removal of over 1.3 L of right pleural fluid.  She has now improved today.  3.  Acute hypoxic respiratory failure-secondary to pulmonary edema  4.  Acute on chronic systolic/diastolic heart failure  5.  Paroxysmal atrial fibrillation  6.  History of paroxysmal atrial fibrillation  7.  Severe peripheral vascular disease  8.  Severe debility  9.  Type 2 diabetes mellitus-this places her at increased risk for recurrent infections  10.  Stage III chronic kidney disease  11.  Recent Covid 19 infection-1/28/23.  This has contributed to her debility    PLAN/RECOMMENDATIONS:   1.  Continue daptomycin  2.  Continue foot wound care  3.  Diuresis per Dr. Bradford Pete MD  4/20/2023  07:26 EDT

## 2023-04-20 NOTE — PROGRESS NOTES
"[facility]  IP Progress Note      Chief Complaint: Follow-up of acute on chronic HFrEF    Subjective   Sitting up in bed eating breakfast.  Reports feeling much better, denies chest pain or shortness of breath.  Wants to know when she will go home.    Objective     Blood pressure 165/82, pulse 63, temperature 97.6 °F (36.4 °C), temperature source Oral, resp. rate 18, height 172 cm (67.72\"), weight 78.2 kg (172 lb 4.8 oz), SpO2 94 %.     Intake/Output Summary (Last 24 hours) at 4/20/2023 0908  Last data filed at 4/20/2023 0451  Gross per 24 hour   Intake --   Output 800 ml   Net -800 ml       Physical Exam:  General: No apparent distress.  Neck: no JVD.  Chest:No respiratory distress, breath sounds are diminished at bases R>L, fine crackles bilaterally.  Cardiovascular: Normal S1 and S2, regular/distant.  Extremities: No edema.    Results Review:     I reviewed the patient's new clinical results.    Results from last 7 days   Lab Units 04/19/23  0428   WBC 10*3/mm3 7.25   HEMOGLOBIN g/dL 10.6*   HEMATOCRIT % 34.9   PLATELETS 10*3/mm3 200     Results from last 7 days   Lab Units 04/20/23  0451   SODIUM mmol/L 148*   POTASSIUM mmol/L 3.8   CHLORIDE mmol/L 106   CO2 mmol/L 35.0*   BUN mg/dL 67*   CREATININE mg/dL 1.33*   CALCIUM mg/dL 7.8*   BILIRUBIN mg/dL 0.8   ALK PHOS U/L 139*   ALT (SGPT) U/L 11   AST (SGOT) U/L 21   GLUCOSE mg/dL 167*     Results from last 7 days   Lab Units 04/20/23  0451   SODIUM mmol/L 148*   POTASSIUM mmol/L 3.8   CHLORIDE mmol/L 106   CO2 mmol/L 35.0*   BUN mg/dL 67*   CREATININE mg/dL 1.33*   GLUCOSE mg/dL 167*   CALCIUM mg/dL 7.8*     Results from last 7 days   Lab Units 04/19/23  0840   INR  1.58*     Lab Results   Component Value Date    CKTOTAL 106 04/17/2023    TROPONINT 310 (C) 04/17/2023     Results from last 7 days   Lab Units 04/17/23  1841   TSH uIU/mL 3.640               Tele: Sinus Rythym with PACs      Assessment:  1. Cardiomyopathy/acute on " chronic HFrEF, EF 41-45%.  Excellent diuresis with clinical improvement  2. Bilateral pleural effusions R>L  3. Nonobstructive CAD  4. PAF, chronically anticoagulated with Eliquis and rate control with metoprolol  5. Hypertension.  6. Dyslipidemia  7. CKD stage III  8. Type 2 diabetes.    Plan:  1. Increased metoprolol to 25 mg daily which is her home dose.  Tolerating well, heart rate stable.  2. Eliquis held on admission, patient may benefit from right thoracentesis.  3. She has diuresed well, renal function is stable.  Bumex changed to 1 mg IV daily, this can be changed to oral on discharge.  Patient maintaining negative fluid balance  4. Restarted losartan 50 mg daily for better control of blood pressure and for GDMT.  5. No spironolactone due to kidney disease and high risk of hyperkalemia with concomitant losartan administration.  6. Continue statin for dyslipidemia management.  7. Management of multiple medical conditions per hospitalist.  8. Decision regarding thoracentesis per hospitalist.  9. Not much more to add from cardiology standpoint, I will sign off and will available to see her as needed.  Please call for specific questions or concerns.    Anjum Ceballos MD, FACC, [facility]

## 2023-04-20 NOTE — CASE MANAGEMENT/SOCIAL WORK
Continued Stay Note  [facility]     Patient Name: Susan Anderson  MRN: [id number]  Today's Date: 4/20/2023    Admit Date: 4/17/2023        Discharge Plan     Row Name 04/20/23 1151       Plan    Plan Comments Case management met with the patient and her daughter regarding the discharge plan and she states that the discharge planning goal is for the patient to be discharged to her home with her caregivers and her daughter said that the family wants her to go home at discharge and not go to rehab.  The family said that they have a wheelchair van and they will bring her wheelchair to the hospital to provide transportation.               Discharge Codes    No documentation.               Expected Discharge Date and Time     Expected Discharge Date Expected Discharge Time    Apr 24, 2023             AMELIA Andres

## 2023-04-20 NOTE — PROGRESS NOTES
[facility] Medicine Services  PROGRESS NOTE    Patient Name: Susan Anderson  : 1940  MRN: [id number]    Date of Admission: 2023  Primary Care Physician: Arleen Doherty MD    Subjective   Subjective     CC: AMS    HPI: Up in bed. States dyspnea much better. No SOA at baseline currently. Dry cough. No chest pain. No n/v. Denies any pain    Review of Systems   Constitutional: Positive for activity change and fatigue.   HENT: Negative.    Respiratory: Negative for chest tightness and shortness of breath.    Cardiovascular: Positive for leg swelling.   Gastrointestinal: Negative.    Genitourinary: Negative.          Vital Signs:   Temp:  [97.6 °F (36.4 °C)-98.2 °F (36.8 °C)] 97.6 °F (36.4 °C)  Heart Rate:  [57-79] 63  Resp:  [18-21] 18  BP: (123-165)/(61-86) 165/82  Flow (L/min):  [3-50] 3     Physical Exam:  NAD, alert and oriented  OP clear, dry MM  Neck supple  No LAD  RRR  Improved BS at bases B  +BS, soft  GARG  Normal affect  No change from     Results Reviewed:  LAB RESULTS:      Lab 23  0451 23  0840 23  0428 23  0349 23  1517   WBC  --   --  7.25 6.37 8.08   HEMOGLOBIN  --   --  10.6* 10.9* 11.7*   HEMATOCRIT  --   --  34.9 36.3 39.3   PLATELETS  --   --  200 199 236   NEUTROS ABS  --   --   --  4.16 6.11   IMMATURE GRANS (ABS)  --   --   --  0.67* 0.51*   LYMPHS ABS  --   --   --  0.86 0.82   MONOS ABS  --   --   --  0.59 0.58   EOS ABS  --   --   --  0.07 0.04   MCV  --   --  99.7* 99.7* 101.6*   CRP  --   --   --   --  8.02*   PROCALCITONIN  --   --   --   --  0.13   LACTATE  --   --   --   --  1.1   *  --   --   --  527*   PROTIME  --  18.9*  --   --   --    D DIMER QUANT  --   --   --   --  2.10*         Lab 23  0451 23  0428 23  0543 23  1841 23  1517   SODIUM 148* 149* 148*  --  142   POTASSIUM 3.8 4.3 4.9  --  5.7*   CHLORIDE 106 104 103  --  102   CO2 35.0* 30.0* 26.0  --  31.0*   ANION  GAP 7.0 15.0 19.0*  --  9.0   BUN 67* 71* 76*  --  80*   CREATININE 1.33* 1.39* 1.40*  --  1.63*   EGFR 39.5* 37.5* 37.2*  --  31.0*   GLUCOSE 167* 216* 160*  --  289*   CALCIUM 7.8* 8.3* 8.6  --  8.4*   MAGNESIUM  --   --   --   --  2.1   TSH  --   --   --  3.640  --          Lab 04/20/23  0451 04/19/23  0428 04/17/23  1517   TOTAL PROTEIN 4.4* 4.9* 5.9*   ALBUMIN 2.3* 2.3* 2.8*   GLOBULIN 2.1 2.6 3.1   ALT (SGPT) 11 13 22   AST (SGOT) 21 19 40*   BILIRUBIN 0.8 0.8 0.6   ALK PHOS 139* 164* 208*         Lab 04/19/23  0840 04/17/23  2027 04/17/23  1841 04/17/23  1517   PROBNP  --   --   --  19,401.0*  18,084.0*   HSTROP T  --  310* 331* 326*   PROTIME 18.9*  --   --   --    INR 1.58*  --   --   --              Lab 04/17/23  1517   FERRITIN 63.04         Lab 04/18/23  0651 04/18/23  0329 04/18/23  0024   FIO2 30 30 21   CARBOXYHEMOGLOBIN (VENOUS) 1.9 1.9 1.7     Brief Urine Lab Results  (Last result in the past 365 days)      Color   Clarity   Blood   Leuk Est   Nitrite   Protein   CREAT   Urine HCG        04/17/23 1528 Yellow   Clear   Trace   Negative   Negative   >=300 mg/dL (3+)                 Microbiology Results Abnormal     Procedure Component Value - Date/Time    Body Fluid Culture - Body Fluid, Pleural Cavity [[id number]] Collected: 04/19/23 1220    Lab Status: Preliminary result Specimen: Body Fluid from Pleural Cavity Updated: 04/20/23 0215     Body Fluid Culture No growth at less than 24 hours     Gram Stain Moderate (3+) WBCs seen      No organisms seen    Blood Culture - Blood, Arm, Right [[id number]]  (Normal) Collected: 04/17/23 1553    Lab Status: Preliminary result Specimen: Blood from Arm, Right Updated: 04/19/23 1700     Blood Culture No growth at 2 days    Blood Culture - Blood, Arm, Left [[id number]]  (Normal) Collected: 04/17/23 1600    Lab Status: Preliminary result Specimen: Blood from Arm, Left Updated: 04/19/23 1700     Blood Culture No growth at 2 days          Adult Transthoracic Echo  Complete W/ Cont if Necessary Per Protocol    Result Date: 4/19/2023  •  Left ventricular systolic function is moderately decreased. Left ventricular ejection fraction appears to be 36 - 40%. •  Left ventricular diastolic function is consistent with (grade I) impaired relaxation. •  Trace to mild mitral vegetation. •  A left sided pleural effusion is noted.     XR Chest 1 View    Result Date: 4/19/2023  XR CHEST 1 VW Date of Exam: 4/19/2023 3:08 PM EDT Indication: Dyspnea. Comparison: 4/19/2023 Findings: The heart appears enlarged. There is indistinctness of the pulmonary vasculature. Small to moderate-sized bilateral pleural effusions are present. No pneumothorax. Patchy airspace disease seen within the lung bases bilaterally. No acute osseous abnormality identified.     Impression: Impression: New/worsening bibasilar airspace disease with small to moderate-sized bilateral pleural effusions with underlying mild pulmonary edema pattern. Electronically Signed: Karen Rey  4/19/2023 3:40 PM EDT  Workstation ID: [id number]    XR Chest 1 View    Result Date: 4/19/2023  XR CHEST 1 VW Date of Exam: 4/19/2023 12:18 PM EDT Indication: right sided thoracentesis. Comparison: April 19, 2023 Findings: The heart looks enlarged. There is an improved appearance of the right basilar area. There is some haziness at the right base that could reflect small right pleural effusion and/or some underlying atelectasis. There remains density in the left lower chest which could relate to pleural effusion and underlying atelectasis. There is no definite pneumothorax.     Impression: Impression: 1.Improved appearance to the right basilar area that could relate to interval thoracentesis. There as probably some residual effusion and atelectasis at the right base. 2.Left pleural effusion and atelectasis is again noted. 3.Cardiomegaly Electronically Signed: Fam Hunt MD  4/19/2023 12:58 PM EDT  Workstation ID: [id number]    XR Chest 1  View    Result Date: 4/19/2023  XR CHEST 1 VW Date of Exam: 4/19/2023 3:30 AM EDT Indication: DYSPNEA, ON EXERTION Comparison: CT chest 4/17/2023. Chest radiograph 4/17/2023. Findings: Stable moderate right and small left pleural effusions with overlying airspace disease/atelectasis. No pneumothorax or new lung opacity. Cardiac silhouette is partially obscured, but appears unchanged. No acute osseous abnormalities.     Impression: Impression: Stable bibasilar airspace disease/atelectasis overlying moderate right and small left pleural effusions. Electronically Signed: Jerry Oliverio  4/19/2023 7:39 AM EDT  Workstation ID: [id number]    US Thoracentesis    Result Date: 4/19/2023  US THORACENTESIS                                                         History: US THORACENTESIS                                                : Abdirashid Jose MD. Assistant:  None.                                                                              Modality: Ultrasound                                                                                                           Sedation: None. Anesthesia: Lidocaine local infiltration..          Estimated blood loss:  < 5 cc.         Technique: Discussion of risks, benefits, and alternatives were made with the patient. The patient expressed understanding and agreed to proceed. Informed written consent was obtained. A universal timeout was performed prior to starting the procedure. Maximal sterile precautions were utilized. The procedure room personnel used personal protective equipment. The operators additionally used sterile surgical gloves. A preliminary ultrasonography was performed. It showed a pleural effusion. A low intercostal access site was selected for access. Pertinent ultrasound images were stored to the PACS for documentation. The patient position was optimized for the performance of thoracentesis. The access site was sterilely prepped and draped. Local  anesthesia was administered. A catheter over the needle system was advanced into the pleura. Straw colored fluid was aspirated and the plastic catheter advanced into the pleural space. The catheter was then connected to a fluid recovery system. Endpoint of thoracentesis: Chest pain At the end of the procedure, the catheter was withdrawn and an aseptic dressing applied. The patient tolerated the procedure well. Postprocedure chest x-ray showed no pneumothorax. After uneventful recovery recovery, the patient was discharged from the department in stable condition.           Complications: None immediate. Specimen: Sent to the lab.                                                                  Impression: Impression:   Successful ultrasound-guided thoracentesis of right pleural effusion with recovery of 1.35 liters of pleural fluid.                                                           Thank you for the opportunity to assist in the care of your patient. Electronically Signed: Abdirashid Jose  4/19/2023 1:55 PM EDT  Workstation ID: [id number]    Doppler Ankle Brachial Index Single Level CAR    Result Date: 4/19/2023  •  Right Conclusion: The right HOMER is unable to be assessed due to vessel incompressibility. Unable to assess digital ischemia.  Waveforms are difficult to assess due to A-fib and PVCs.  Reported palpable pulses. •  Left Conclusion: The left HOMER is unable to be assessed due to vessel incompressibility.  Possible severe digital ischemia. Waveforms are difficult to assess due to A-fib and PVCs.  Reported palpable pulses. •  Findings overall are inconclusive.  Alternative evaluation methods such as an arterial duplex ultrasound or CT angiogram with runoff could be considered if clinically warranted.       Results for orders placed during the hospital encounter of 04/17/23    Adult Transthoracic Echo Complete W/ Cont if Necessary Per Protocol    Interpretation Summary  •  Left ventricular systolic function is  moderately decreased. Left ventricular ejection fraction appears to be 36 - 40%.  •  Left ventricular diastolic function is consistent with (grade I) impaired relaxation.  •  Trace to mild mitral vegetation.  •  A left sided pleural effusion is noted.      Current medications:  Scheduled Meds:atorvastatin, 40 mg, Oral, Nightly  bumetanide, 1 mg, Intravenous, Q12H  clopidogrel, 75 mg, Oral, Daily  DAPTOmycin, 4 mg/kg (Adjusted), Intravenous, Q48H  famotidine, 20 mg, Oral, Daily  insulin detemir, 5 Units, Subcutaneous, Daily  insulin lispro, 0-7 Units, Subcutaneous, TID With Meals  losartan, 50 mg, Oral, Q24H  metoprolol succinate XL, 25 mg, Oral, Q24H  piperacillin-tazobactam, 3.375 g, Intravenous, Q8H  polyethylene glycol, 17 g, Oral, Daily  senna-docusate sodium, 2 tablet, Oral, BID  sertraline, 50 mg, Oral, Daily      Continuous Infusions:Pharmacy Consult - Pharmacy to dose,       PRN Meds:.•  senna-docusate sodium **AND** polyethylene glycol **AND** bisacodyl **AND** bisacodyl  •  clonazePAM  •  nitroglycerin  •  ondansetron  •  Pharmacy Consult - Pharmacy to dose  •  sodium chloride  •  traMADol    Assessment & Plan   Assessment & Plan     Active Hospital Problems    Diagnosis  POA   • **Cellulitis of right foot due to methicillin-resistant Staphylococcus aureus [L03.115, B95.62]  Yes   • Acute respiratory failure with hypoxia and hypercarbia [J96.01, J96.02]  Unknown   • T2DM  [E11.9]  Yes   • Paroxysmal atrial fibrillation [I48.0]  Yes   • Chronic combined systolic and diastolic heart failure  [I50.42]  Yes   • NICM (nonischemic cardiomyopathy) ([facility]) [I42.8]  Yes   • Dyslipidemia [E78.5]  Yes   • Mitral valve disease [I05.9]  Yes   • Acute on chronic systolic CHF (congestive heart failure) [I50.23]  Yes   • Diabetic foot ulcer [E11.621, L97.509]  Yes   • PVD (peripheral vascular disease) ([facility]) [I73.9]  Yes   • Diabetes mellitus with neuropathy [E11.40]  Yes   • Stage 3a chronic kidney disease [N18.31]  Yes   •  Essential hypertension [I10]  Yes      Resolved Hospital Problems   No resolved problems to display.        Brief Hospital Course to date:  Susan Anderson is a 82 y.o. chronically ill female w/ HTN, HLD, CKD3, CAD, PAD, HFrEF (41-45%), PUD/GIB, pAfib, DM2, with 6 admissions since August 2022 (predominantly for CHF). She was admitted 1/3-1/20 after PEA arrest requiring CPR after receiving propofol for EGD; and then COVID and acute renal failure requiring initiation of dialysis. Since then she has been battling bilateral heel wounds and been following with [facility] and Dr. Finney and outpatient PT. She has had peripheral stent placed as well.     Metabolic encephalopathy  -NIPPV as needed  -monitor progress, better     A/C HFrEF- ED 40%  Bilateral pleural effusions  -diuresis with bumex  -monitor electrolytes  -hyperkalemia better  -ask for cardiology assistance, following  -bradycardia overnight, decrease BB, cardiology following    B effusions R>L  -s/p R thoracentesis with over 1L removed, dyspnea better today but likely had some flash pulmonary edema thereafter, s/ bumex, repeat CXR pending, and will stop zosyn if stable  -if stable, resume eliquis    Hx of PAF  -Likely to resume Eliquis today if CXR stable    Hx of PUD/GIB  -PPI allergy listed  -pepcid     Chronic bilateral heel wounds  -followed by Dr. Finney/[facility]  -on daptomycin  -wound care  -outpatient follow up with vascular surgery     Deconditioned  -PT/OT     T2DM  -SSI, add basal     DVT prophylaxis: Eliquis, likely to resume today    Expected Discharge Location and Transportation: Rehab  Expected Discharge   Expected Discharge Date and Time     Expected Discharge Date Expected Discharge Time    Apr 24, 2023            DVT prophylaxis:  No DVT prophylaxis order currently exists.     AM-PAC 6 Clicks Score (PT): 10 (04/19/23 7970)    CODE STATUS:   Code Status and Medical Interventions:   Ordered at: 04/17/23 1800     Code Status (Patient has no pulse and is not  breathing):    No CPR (Do Not Attempt to Resuscitate)     Medical Interventions (Patient has pulse or is breathing):    Full       Breezy Felder MD  04/20/23

## 2023-04-21 ENCOUNTER — DOCUMENTATION (OUTPATIENT)
Dept: PHYSICAL THERAPY | Facility: HOSPITAL | Age: 83
End: 2023-04-21
Payer: MEDICARE

## 2023-04-21 LAB
GLUCOSE BLDC GLUCOMTR-MCNC: 155 MG/DL (ref 70–130)
GLUCOSE BLDC GLUCOMTR-MCNC: 160 MG/DL (ref 70–130)
GLUCOSE BLDC GLUCOMTR-MCNC: 189 MG/DL (ref 70–130)
GLUCOSE BLDC GLUCOMTR-MCNC: 237 MG/DL (ref 70–130)

## 2023-04-21 PROCEDURE — 63710000001 INSULIN DETEMIR PER 5 UNITS: Performed by: HOSPITALIST

## 2023-04-21 PROCEDURE — 25010000002 DAPTOMYCIN PER 1 MG: Performed by: INTERNAL MEDICINE

## 2023-04-21 PROCEDURE — 99231 SBSQ HOSP IP/OBS SF/LOW 25: CPT | Performed by: HOSPITALIST

## 2023-04-21 PROCEDURE — 82962 GLUCOSE BLOOD TEST: CPT

## 2023-04-21 PROCEDURE — 99231 SBSQ HOSP IP/OBS SF/LOW 25: CPT | Performed by: THORACIC SURGERY (CARDIOTHORACIC VASCULAR SURGERY)

## 2023-04-21 PROCEDURE — 63710000001 INSULIN LISPRO (HUMAN) PER 5 UNITS: Performed by: INTERNAL MEDICINE

## 2023-04-21 RX ORDER — NYSTATIN 100000 [USP'U]/G
POWDER TOPICAL EVERY 12 HOURS SCHEDULED
Status: DISCONTINUED | OUTPATIENT
Start: 2023-04-21 | End: 2023-04-28 | Stop reason: HOSPADM

## 2023-04-21 RX ADMIN — INSULIN LISPRO 2 UNITS: 100 INJECTION, SOLUTION INTRAVENOUS; SUBCUTANEOUS at 14:30

## 2023-04-21 RX ADMIN — METOPROLOL SUCCINATE 25 MG: 25 TABLET, EXTENDED RELEASE ORAL at 09:14

## 2023-04-21 RX ADMIN — FAMOTIDINE 20 MG: 20 TABLET, FILM COATED ORAL at 09:15

## 2023-04-21 RX ADMIN — SENNOSIDES AND DOCUSATE SODIUM 2 TABLET: 8.6; 5 TABLET ORAL at 20:35

## 2023-04-21 RX ADMIN — DAPTOMYCIN 300 MG: 500 INJECTION, POWDER, LYOPHILIZED, FOR SOLUTION INTRAVENOUS at 20:35

## 2023-04-21 RX ADMIN — BUMETANIDE 1 MG: 0.25 INJECTION, SOLUTION INTRAMUSCULAR; INTRAVENOUS at 09:14

## 2023-04-21 RX ADMIN — BUMETANIDE 1 MG: 0.25 INJECTION, SOLUTION INTRAMUSCULAR; INTRAVENOUS at 20:35

## 2023-04-21 RX ADMIN — SERTRALINE HYDROCHLORIDE 50 MG: 50 TABLET ORAL at 09:15

## 2023-04-21 RX ADMIN — INSULIN DETEMIR 5 UNITS: 100 INJECTION, SOLUTION SUBCUTANEOUS at 09:13

## 2023-04-21 RX ADMIN — ATORVASTATIN CALCIUM 40 MG: 40 TABLET, FILM COATED ORAL at 20:35

## 2023-04-21 RX ADMIN — INSULIN LISPRO 2 UNITS: 100 INJECTION, SOLUTION INTRAVENOUS; SUBCUTANEOUS at 09:14

## 2023-04-21 RX ADMIN — INSULIN LISPRO 2 UNITS: 100 INJECTION, SOLUTION INTRAVENOUS; SUBCUTANEOUS at 18:30

## 2023-04-21 RX ADMIN — CLOPIDOGREL BISULFATE 75 MG: 75 TABLET ORAL at 09:15

## 2023-04-21 RX ADMIN — APIXABAN 2.5 MG: 2.5 TABLET, FILM COATED ORAL at 09:15

## 2023-04-21 RX ADMIN — APIXABAN 2.5 MG: 2.5 TABLET, FILM COATED ORAL at 20:35

## 2023-04-21 RX ADMIN — LOSARTAN POTASSIUM 50 MG: 50 TABLET, FILM COATED ORAL at 09:14

## 2023-04-21 RX ADMIN — NYSTATIN: 100000 POWDER TOPICAL at 20:35

## 2023-04-21 NOTE — CASE MANAGEMENT/SOCIAL WORK
Continued Stay Note  [facility]     Patient Name: Susan Anderson  MRN: [id number]  Today's Date: 4/21/2023    Admit Date: 4/17/2023        Discharge Plan     Row Name 04/21/23 1504       Plan    Plan Comments [facility] is following for discharge planning and her goal is to be discharged to her home with her caregivers and her daughter said that the family wants her to go home at discharge and not go to rehab.  The family said that they have a wheelchair van and they will bring her wheelchair to the hospital to provide transportation.               Discharge Codes    No documentation.               Expected Discharge Date and Time     Expected Discharge Date Expected Discharge Time    Apr 24, 2023             AMELIA Andres

## 2023-04-21 NOTE — PLAN OF CARE
Problem: Adult Inpatient Plan of Care  Goal: Plan of Care Review  Outcome: Ongoing, Progressing  Goal: Patient-Specific Goal (Individualized)  Outcome: Ongoing, Progressing  Goal: Absence of Hospital-Acquired Illness or Injury  Outcome: Ongoing, Progressing  Intervention: Identify and Manage Fall Risk  Recent Flowsheet Documentation  Taken 4/21/2023 0200 by Darlene Stark RN  Safety Promotion/Fall Prevention: activity supervised  Taken 4/21/2023 0000 by Darlene Stark RN  Safety Promotion/Fall Prevention: activity supervised  Taken 4/20/2023 2200 by Darlene Stark RN  Safety Promotion/Fall Prevention:   activity supervised   clutter free environment maintained   fall prevention program maintained   room organization consistent   safety round/check completed  Taken 4/20/2023 2000 by Darlene Stark RN  Safety Promotion/Fall Prevention: activity supervised  Intervention: Prevent Skin Injury  Recent Flowsheet Documentation  Taken 4/21/2023 0200 by Darlene Stark RN  Body Position: position changed independently  Taken 4/21/2023 0000 by Darlene Stark RN  Body Position: position changed independently  Taken 4/20/2023 2200 by Darlene Stark RN  Body Position: position changed independently  Taken 4/20/2023 2000 by Darlene Stark RN  Body Position: position changed independently  Intervention: Prevent and Manage VTE (Venous Thromboembolism) Risk  Recent Flowsheet Documentation  Taken 4/21/2023 0200 by Darlene Stark RN  Activity Management: activity encouraged  Taken 4/21/2023 0000 by Darlene Stark RN  Activity Management: activity encouraged  Taken 4/20/2023 2200 by Darlene Stark RN  Activity Management: activity encouraged  Taken 4/20/2023 2000 by Darlene Stark RN  Activity Management: activity encouraged  Intervention: Prevent Infection  Recent Flowsheet Documentation  Taken 4/21/2023 0200 by Darlene Stark RN  Infection Prevention: environmental surveillance performed  Taken 4/20/2023 2000 by Mi  DEANN Colon  Infection Prevention: environmental surveillance performed  Goal: Optimal Comfort and Wellbeing  Outcome: Ongoing, Progressing  Intervention: Provide Person-Centered Care  Recent Flowsheet Documentation  Taken 4/20/2023 2000 by Darlene Stark RN  Trust Relationship/Rapport:   care explained   choices provided  Goal: Readiness for Transition of Care  Outcome: Ongoing, Progressing     Problem: Fall Injury Risk  Goal: Absence of Fall and Fall-Related Injury  Outcome: Ongoing, Progressing  Intervention: Identify and Manage Contributors  Recent Flowsheet Documentation  Taken 4/21/2023 0200 by Darlene Stark RN  Medication Review/Management: medications reviewed  Taken 4/21/2023 0000 by Darlene Stark RN  Medication Review/Management: medications reviewed  Taken 4/20/2023 2200 by Darlene Stark RN  Medication Review/Management: medications reviewed  Taken 4/20/2023 2000 by Darlene Stark RN  Medication Review/Management: medications reviewed  Intervention: Promote Injury-Free Environment  Recent Flowsheet Documentation  Taken 4/21/2023 0200 by Darlene Stark RN  Safety Promotion/Fall Prevention: activity supervised  Taken 4/21/2023 0000 by Darlene Stark RN  Safety Promotion/Fall Prevention: activity supervised  Taken 4/20/2023 2200 by Darlene Satrk RN  Safety Promotion/Fall Prevention:   activity supervised   clutter free environment maintained   fall prevention program maintained   room organization consistent   safety round/check completed  Taken 4/20/2023 2000 by Darlene Stark RN  Safety Promotion/Fall Prevention: activity supervised     Problem: Diabetes Comorbidity  Goal: Blood Glucose Level Within Targeted Range  Outcome: Ongoing, Progressing     Problem: Heart Failure Comorbidity  Goal: Maintenance of Heart Failure Symptom Control  Outcome: Ongoing, Progressing  Intervention: Maintain Heart Failure-Management  Recent Flowsheet Documentation  Taken 4/21/2023 0200 by Darlene Stark  RN  Medication Review/Management: medications reviewed  Taken 4/21/2023 0000 by Darlene Stark RN  Medication Review/Management: medications reviewed  Taken 4/20/2023 2200 by Darlene Stark RN  Medication Review/Management: medications reviewed  Taken 4/20/2023 2000 by Darlene Stark RN  Medication Review/Management: medications reviewed     Problem: Hypertension Comorbidity  Goal: Blood Pressure in Desired Range  Outcome: Ongoing, Progressing  Intervention: Maintain Blood Pressure Management  Recent Flowsheet Documentation  Taken 4/21/2023 0200 by Darlene Stark RN  Medication Review/Management: medications reviewed  Taken 4/21/2023 0000 by Darlene Stark RN  Medication Review/Management: medications reviewed  Taken 4/20/2023 2200 by Darlene Stark RN  Medication Review/Management: medications reviewed  Taken 4/20/2023 2000 by Darlene Stark RN  Medication Review/Management: medications reviewed     Problem: Osteoarthritis Comorbidity  Goal: Maintenance of Osteoarthritis Symptom Control  Outcome: Ongoing, Progressing  Intervention: Maintain Osteoarthritis Symptom Control  Recent Flowsheet Documentation  Taken 4/21/2023 0200 by Darlene Stark RN  Activity Management: activity encouraged  Medication Review/Management: medications reviewed  Taken 4/21/2023 0000 by Darlene Stark RN  Activity Management: activity encouraged  Medication Review/Management: medications reviewed  Taken 4/20/2023 2200 by Darlene Stark RN  Activity Management: activity encouraged  Medication Review/Management: medications reviewed  Taken 4/20/2023 2000 by Darlene Stark RN  Activity Management: activity encouraged  Medication Review/Management: medications reviewed     Problem: Pain Chronic (Persistent) (Comorbidity Management)  Goal: Acceptable Pain Control and Functional Ability  Outcome: Ongoing, Progressing  Intervention: Manage Persistent Pain  Recent Flowsheet Documentation  Taken 4/21/2023 0200 by Darlene Stark  RN  Medication Review/Management: medications reviewed  Taken 4/21/2023 0000 by Darlene Stark RN  Medication Review/Management: medications reviewed  Taken 4/20/2023 2200 by Darlene Stark RN  Medication Review/Management: medications reviewed  Taken 4/20/2023 2000 by Darlene Stark RN  Medication Review/Management: medications reviewed  Intervention: Optimize Psychosocial Wellbeing  Recent Flowsheet Documentation  Taken 4/20/2023 2000 by Darlene Stark RN  Supportive Measures: active listening utilized  Diversional Activities: television     Problem: Skin Injury Risk Increased  Goal: Skin Health and Integrity  Outcome: Ongoing, Progressing  Intervention: Optimize Skin Protection  Recent Flowsheet Documentation  Taken 4/21/2023 0200 by Darlene Stark RN  Head of Bed (HOB) Positioning: HOB elevated  Taken 4/21/2023 0000 by Darlene Stark RN  Head of Bed (HOB) Positioning: HOB elevated  Taken 4/20/2023 2200 by Darlene Stark RN  Head of Bed (HOB) Positioning: HOB elevated  Taken 4/20/2023 2000 by Darlene Stark RN  Pressure Reduction Techniques:   weight shift assistance provided   heels elevated off bed  Head of Bed (HOB) Positioning: HOB elevated  Pressure Reduction Devices: pressure-redistributing mattress utilized   Goal Outcome Evaluation:

## 2023-04-21 NOTE — THERAPY DISCHARGE NOTE
Outpatient Rehabilitation - Wound/Debridement Discharge Summary       Patient Name: Susan Anderson  : 1940  MRN: [id number]  Today's Date: 2023                  Admit Date: (Not on file)    Visit Dx:  No diagnosis found.    Patient Active Problem List   Diagnosis   • Diabetic foot ulcer   • PVD (peripheral vascular disease) ([facility])   • Osteomyelitis   • Diabetes mellitus with neuropathy   • Essential hypertension   • Stage 3a chronic kidney disease   • Acute on chronic systolic CHF (congestive heart failure)   • Mitral valve disease   • NICM (nonischemic cardiomyopathy) ([facility])   • CAD   • Dyslipidemia   • Chronic combined systolic and diastolic heart failure    • Lumbar stenosis with neurogenic claudication   • Spondylolisthesis, lumbar region   • Gait disturbance   • Anemia   • Sleep apnea   • Paroxysmal atrial fibrillation   • T2DM    • Iron deficiency anemia, unspecified   • Cellulitis of right foot due to methicillin-resistant Staphylococcus aureus   • Acute respiratory failure with hypoxia and hypercarbia        Past Medical History:   Diagnosis Date   • Anemia    • Anxiety    • Arthritis    • Asthma  - double pneumonia    Currently on inhaler and nebulizer   • Atrial fibrillation 2022   • Brain tumor     Benign and followed at    • Cancer     cervical cancer, skin cancer   • CHF (congestive heart failure) 2021   • Chronic kidney disease Related to diabetes   • Clotting disorder 10/22    Upper GI bleed from blood thinners aggravate ulcers   • Coronary artery disease 2021 DX for hear failure   • COVID-19 2023   • Depression    • Diabetes mellitus 30 years    Seeing Dr. Lam 1st time Aug 19   • Dizziness 2022   • Effusion of right knee 2022   • Fall 2022   • GERD (gastroesophageal reflux disease)    • Gout    • History of medical problems     AFIB   • History of staph infection 10/2021    right toe   • History of transfusion     no  reactions, 1 unit, [facility]   • HL (hearing loss)     Acoustic neuroma right   • Hx of colonoscopy    • Hyperlipidemia Reference current labs x 2-3yrs approx   • Hypertension 30 years   • Hypomagnesemia 10/06/2022   • Insomnia    • Low back pain    • Migraine    • Mitral valve disease    • Mitral valve disease    • Osteomyelitis    • Peptic ulceration 10/22    Secondary to blood thinners   • Peripheral neuropathy    • Physical deconditioning 05/17/2022   • Pneumonia due to infectious organism 06/04/2021   • Pressure injury of skin of sacral region 08/21/2022   • Renal insufficiency 2021   • Sepsis 01/28/2023   • Sleep apnea    • Syncope, unspecified syncope type 10/06/2022   • Type 2 diabetes mellitus     30 years   • Weakness 07/27/2022        Past Surgical History:   Procedure Laterality Date   • ABDOMINAL HYSTERECTOMY W/SALPINGECTOMY     • AMPUTATION  Right great toe, 1st 1/3 metatarsal -[place] 4/14/21   • AORTAGRAM N/A 04/09/2021    Procedure: AORTAGRAM WITH OR WITHOUT RUNOFFS, WITH Co2;  Surgeon: Trey Forrest MD;  Location:  [facility];  Service: Vascular;  Laterality: N/A;   • AORTAGRAM N/A 09/28/2022    Procedure: CO2 ANGIOGRAM, LEFT SFA ANGIOPLASTY WITH DRUG ELUTING BALLOON, LEFT SFA STENT PLACEMENT ;  Surgeon: Trey Forrest MD;  Location:  [facility];  Service: Vascular;  Laterality: N/A;  FLUORO: 13.12  DOSE 162mGy  CONTRAST: Isovue 350: 15ml   • APPENDECTOMY     • BRAIN TUMOR EXCISION      laser surgery    • CARDIAC CATHETERIZATION N/A 06/21/2021    Procedure: LEFT HEART CATH;  Surgeon: Anjum Ceballos MD;  Location:  [facility] CATH INVASIVE LOCATION;  Service: Cardiology;  Laterality: N/A;   • CATARACT EXTRACTION W/ INTRAOCULAR LENS  IMPLANT, BILATERAL     • CHOLECYSTECTOMY     • COLONOSCOPY     • ENDOSCOPY N/A 10/07/2022    Procedure: ESOPHAGOGASTRODUODENOSCOPY;  Surgeon: Stef Veras MD;  Location:  [facility] ENDOSCOPY;  Service: Gastroenterology;  Laterality: N/A;   • EYE SURGERY     • FEMORAL  ARTERY STENT  10/2022   • HYSTERECTOMY     • INTERVENTIONAL RADIOLOGY PROCEDURE N/A 1/15/2023    Procedure: CV INTERVENTIONAL RADIOLOGY PROCEDURE;  Surgeon: Sanket Zapata MD;  Location:  [facility] CATH INVASIVE LOCATION;  Service: Interventional Radiology;  Laterality: N/A;   • TOE SURGERY     • TRANS METATARSAL AMPUTATION Right 04/14/2021    Procedure: AMPUTATION TRANS METATARSAL RIGHT GREAT TOE;  Surgeon: Valdez Finney MD;  Location:  [facility] OR;  Service: Vascular;  Laterality: Right;         Goals   PT OP Goals     Row Name 04/21/23 0700          PT Short Term Goals    STG 1 Reduce L heel wound dimensions by 25% as evidence of wound healing.  -     STG 1 Progress Ongoing;Not Met  -     STG 2 Increase granulation formation to 25% of wound bed or greater, to promote wound closure.  -     STG 2 Progress Ongoing;Not Met  -        Long Term Goals    LTG 1 Pt/family independent with home dressing changes.  -     LTG 1 Progress Progressing;Not Met  -     LTG 2 L foot wound and L proximal leg wound to be closed/resurfaced to demonstrate appropriate healing.  -     LTG 2 Progress Ongoing;Not Met  -     LTG 3 Patient/family indpendent with compression use for home edema management.  -     LTG 3 Progress Ongoing;Not Met  -     LTG 4 Pt will demonstrate 90% reduction in L heel wound area to indicate healing progress.  -     LTG 4 Progress Ongoing;Not Met  -           User Key  (r) = Recorded By, (t) = Taken By, (c) = Cosigned By    Initials Name Provider Type    Shi Mane, PT Physical Therapist               OP Discharge Summary     Row Name 04/21/23 0742             OP PT Discharge Summary    Date of Discharge 04/17/23  -      Reason for Discharge Transfer to other facility/level of care  -      Outcomes Achieved Patient able to partially acheive established goals  -      Discharge Destination Hospital admission  -            User Key  (r) = Recorded By, (t) = Taken By, (c) =  Cosigned By    Initials Name Provider Type    Shi Mane, PT Physical Therapist                Shi Cordoba, PT  4/21/2023

## 2023-04-21 NOTE — PROGRESS NOTES
INFECTIOUS DISEASE Progress Note    Susan Anderson  1940  [id number]    Admission Date: 4/17/2023      Requesting Provider: No ref. provider found  Evaluating Physician: Stef Pete MD    Reason for Consultation: Bilateral foot infections    History of present illness:    4/18/23: Patient is a 83 y.o. female With type 2 diabetes mellitus, stage III chronic kidney disease, coronary artery disease, systolic/diastolic congestive heart failure, Severe peripheral vascular disease, recurrent bilateral foot infections, recent bilateral heel decubitus ulcers, a recent MRSA right toe wound infection, and recent Covid 19 in late January 2023 who is seen today after she presented with dyspnea and fatigue, and malaise. I saw her recently in the office with bilateral heel decubitus ulcers and a right fourth toe ulcer with associated cellulitis.  I initially started her on Augmentin therapy but then switched to minocycline after a right fourth toe culture returned positive for MRSA. I followed her during an admission from 1/3 until 1/20/23 for bibasilar pneumonia, Klebsiella/E. Coli UTI, and PEA arrest with acute hypoxic respiratory failure.  She suffered from acute renal failure requiring dialysis but her dialysis has subsequently been discontinued.  She was readmitted from 1/28 to 2/2/23 for Covid 19 infection for which she received Decadron but was not able to receive remdesivir due to bradycardia.  She was readmitted last night with dyspnea and acute hypoxic respiratory failure.  She has required BiPAP and is currently on 30% FiO2 with an O2 saturation of 98%.  She is lethargic and unable to provide any history. Chest CT scan revealed moderate to large bilateral pleural effusions and bibasilar atelectasis with cardiomegaly.  She is undergoing diuresis.She has been started on intravenous daptomycin.  She has remained afebrile.Her pro-BNP yesterday was 19,401. Her creatinine was 1.63.Her white blood cell count  was 8.1.Her creatinine today is 1.4 and her white blood cell count is 6.4.     4/19/23:She remains afebrile.She is on 3 L of oxygen with an O2 saturation of 93%. X-ray today reveals right greater than left effusions/opacities.  She is scheduled to undergo right thoracentesis today.  She has decreased dyspnea.  She has some nausea but denies vomiting.    4/20/23:She remains afebrile.  Her creatinine is 1.33. Her O2 saturation is 94% on 3 L. She underwent ultrasound-guided thoracentesis of the right pleural effusion yesterday yielding 1.35 L of pleural fluid. She had increased dyspnea immediately after her thoracentesis but this has dramatically improved.  Her chest x-ray shortly after thoracentesis revealed marked improvement but then she had evidence of bilateral congestion consistent with pulmonary edema. She transiently received Zosyn yesterday due to concerns over possible aspiration pneumonia but this has been discontinued by Dr. Felder.    4/21/23:She remains afebrile.Pleural fluid culture from 4/19 no growth so far. Her MRSA right foot isolate from 4/12 was sensitive to tetracycline and Bactrim.  She is currently on 3 L of oxygen with an O2 saturation of 96-99%.  She has decreased dyspnea.  She denies cough and sputum production.    Past Medical History:   Diagnosis Date   • Anemia 8/22   • Anxiety    • Arthritis    • Asthma 6/4 - double pneumonia    Currently on inhaler and nebulizer   • Atrial fibrillation 08/21/2022   • Brain tumor     Benign and followed at    • Cancer     cervical cancer, skin cancer   • CHF (congestive heart failure) 06/04/2021   • Chronic kidney disease Related to diabetes   • Clotting disorder 10/22    Upper GI bleed from blood thinners aggravate ulcers   • Coronary artery disease 6/4/2021 DX for hear failure   • COVID-19 01/28/2023   • Depression 8/22   • Diabetes mellitus 30 years    Seeing Dr. Lam 1st time Aug 19   • Dizziness 07/27/2022   • Effusion of right knee 08/12/2022    • Fall 07/27/2022   • GERD (gastroesophageal reflux disease)    • Gout    • History of medical problems 8/22    AFIB   • History of staph infection 10/2021    right toe   • History of transfusion     no reactions, 1 unit, [facility]   • HL (hearing loss)     Acoustic neuroma right   • Hx of colonoscopy    • Hyperlipidemia Reference current labs x 2-3yrs approx   • Hypertension 30 years   • Hypomagnesemia 10/06/2022   • Insomnia    • Low back pain    • Migraine    • Mitral valve disease    • Mitral valve disease    • Osteomyelitis    • Peptic ulceration 10/22    Secondary to blood thinners   • Peripheral neuropathy    • Physical deconditioning 05/17/2022   • Pneumonia due to infectious organism 06/04/2021   • Pressure injury of skin of sacral region 08/21/2022   • Renal insufficiency 2021   • Sepsis 01/28/2023   • Sleep apnea    • Syncope, unspecified syncope type 10/06/2022   • Type 2 diabetes mellitus     30 years   • Weakness 07/27/2022       Past Surgical History:   Procedure Laterality Date   • ABDOMINAL HYSTERECTOMY W/SALPINGECTOMY     • AMPUTATION  Right great toe, 1st 1/3 metatarsal -[place] 4/14/21   • AORTAGRAM N/A 04/09/2021    Procedure: AORTAGRAM WITH OR WITHOUT RUNOFFS, WITH Co2;  Surgeon: Tery Forrest MD;  Location:  [facility];  Service: Vascular;  Laterality: N/A;   • AORTAGRAM N/A 09/28/2022    Procedure: CO2 ANGIOGRAM, LEFT SFA ANGIOPLASTY WITH DRUG ELUTING BALLOON, LEFT SFA STENT PLACEMENT ;  Surgeon: Trey Forrest MD;  Location:  [facility];  Service: Vascular;  Laterality: N/A;  FLUORO: 13.12  DOSE 162mGy  CONTRAST: Isovue 350: 15ml   • APPENDECTOMY     • BRAIN TUMOR EXCISION      laser surgery    • CARDIAC CATHETERIZATION N/A 06/21/2021    Procedure: LEFT HEART CATH;  Surgeon: Anjum Ceballos MD;  Location:  [facility];  Service: Cardiology;  Laterality: N/A;   • CATARACT EXTRACTION W/ INTRAOCULAR LENS  IMPLANT, BILATERAL     • CHOLECYSTECTOMY     • COLONOSCOPY     •  ENDOSCOPY N/A 10/07/2022    Procedure: ESOPHAGOGASTRODUODENOSCOPY;  Surgeon: Stef Veras MD;  Location:  [facility];  Service: Gastroenterology;  Laterality: N/A;   • EYE SURGERY     • FEMORAL ARTERY STENT  10/2022   • HYSTERECTOMY     • INTERVENTIONAL RADIOLOGY PROCEDURE N/A 1/15/2023    Procedure: CV INTERVENTIONAL RADIOLOGY PROCEDURE;  Surgeon: Sanket Zapata MD;  Location:  [facility];  Service: Interventional Radiology;  Laterality: N/A;   • TOE SURGERY     • TRANS METATARSAL AMPUTATION Right 04/14/2021    Procedure: AMPUTATION TRANS METATARSAL RIGHT GREAT TOE;  Surgeon: Valdez Finney MD;  Location:  [facility];  Service: Vascular;  Laterality: Right;       Family History   Problem Relation Age of Onset   • Diabetes Mother         Type II   • Heart disease Father    • Heart attack Father    • Coronary artery disease Father    • Diabetes Father         Type II   • Diabetes Sister    • Diabetes Maternal Grandmother    • Diabetes Maternal Grandfather    • Diabetes Paternal Grandmother    • Diabetes Paternal Grandfather        Social History     Socioeconomic History   • Marital status:    • Number of children: 1   Tobacco Use   • Smoking status: Never   • Smokeless tobacco: Never   Vaping Use   • Vaping Use: Never used   Substance and Sexual Activity   • Alcohol use: Not Currently     Comment: Social drinking when not recovering from hospitalization   • Drug use: Never   • Sexual activity: Not Currently     Birth control/protection: None       Allergies   Allergen Reactions   • Vancomycin Other (See Comments)     Acute Kidney Injury, requested by ID   • Baclofen Other (See Comments) and Hallucinations     PSYCHOSIS-POA REFUSES ADMINISTRATION OF THIS MED.   • Cephalexin Nausea Only   • Erythromycin Base Nausea Only   • Melatonin Other (See Comments)     Nightmares   • Oxycodone Nausea Only   • Protonix [Pantoprazole] Itching and Rash   • Sulfa Antibiotics Nausea Only          Medication:    Current Facility-Administered Medications:   •  apixaban (ELIQUIS) tablet 2.5 mg, 2.5 mg, Oral, Q12H, Breezy Felder MD, 2.5 mg at 04/21/23 0915  •  atorvastatin (LIPITOR) tablet 40 mg, 40 mg, Oral, Nightly, Faiza Angulo MD, 40 mg at 04/20/23 2021  •  sennosides-docusate (PERICOLACE) 8.6-50 MG per tablet 2 tablet, 2 tablet, Oral, BID, 2 tablet at 04/20/23 2021 **AND** polyethylene glycol (MIRALAX) packet 17 g, 17 g, Oral, Daily PRN **AND** bisacodyl (DULCOLAX) EC tablet 5 mg, 5 mg, Oral, Daily PRN **AND** bisacodyl (DULCOLAX) suppository 10 mg, 10 mg, Rectal, Daily PRN, Faiza Angulo MD  •  bumetanide (BUMEX) injection 1 mg, 1 mg, Intravenous, Q12H, Anjum Ceballos MD, 1 mg at 04/21/23 0914  •  clonazePAM (KlonoPIN) tablet 0.5 mg, 0.5 mg, Oral, BID PRN, Faiza Angulo MD, 0.5 mg at 04/17/23 2154  •  clopidogrel (PLAVIX) tablet 75 mg, 75 mg, Oral, Daily, Faiza Angulo MD, 75 mg at 04/21/23 0915  •  DAPTOmycin (CUBICIN) 300 mg in sodium chloride 0.9 % 50 mL IVPB, 4 mg/kg (Adjusted), Intravenous, Q48H, Faiza Angulo MD, Last Rate: 100 mL/hr at 04/19/23 1722, 300 mg at 04/19/23 1722  •  famotidine (PEPCID) tablet 20 mg, 20 mg, Oral, Daily, Breezy Felder MD, 20 mg at 04/21/23 0915  •  insulin detemir (LEVEMIR) injection 5 Units, 5 Units, Subcutaneous, Daily, Breezy Felder MD, 5 Units at 04/21/23 0913  •  Insulin Lispro (humaLOG) injection 0-7 Units, 0-7 Units, Subcutaneous, TID With Meals, Faiza Angulo MD, 2 Units at 04/21/23 1430  •  losartan (COZAAR) tablet 50 mg, 50 mg, Oral, Q24H, Anjum Ceballos MD, 50 mg at 04/21/23 0914  •  metoprolol succinate XL (TOPROL-XL) 24 hr tablet 25 mg, 25 mg, Oral, Q24H, Anjum Ceballos MD, 25 mg at 04/21/23 0914  •  nitroglycerin (NITROSTAT) ointment 0.5 inch, 0.5 inch, Topical, Q6H PRN, Faiza Angulo MD  •  ondansetron (ZOFRAN) injection 4 mg, 4 mg, Intravenous, Q6H PRN, Breezy Felder MD, 4 mg at 04/19/23 1237  •  Pharmacy  Consult - Pharmacy to dose, , Does not apply, Continuous PRN, Faiza Angulo MD  •  polyethylene glycol (MIRALAX) packet 17 g, 17 g, Oral, Daily, Faiza Angulo MD, 17 g at 23 0932  •  sertraline (ZOLOFT) tablet 50 mg, 50 mg, Oral, Daily, Faiza Angulo MD, 50 mg at 23 0915  •  sodium chloride 0.9 % flush 10 mL, 10 mL, Intravenous, PRN, Faiza Angulo MD, 10 mL at 23  •  traMADol (ULTRAM) tablet 25 mg, 25 mg, Oral, Q12H PRN, Faiza Angulo MD, 25 mg at 23 0937    Antibiotics:  Anti-Infectives (From admission, onward)    Ordered     Dose/Rate Route Frequency Start Stop    23 211  DAPTOmycin (CUBICIN) 300 mg in sodium chloride 0.9 % 50 mL IVPB        Ordering Provider: Faiza Angulo MD    4 mg/kg × 69.2 kg (Adjusted)  100 mL/hr over 30 Minutes Intravenous Every 48 Hours 23 1800 23 1759    23 1545  piperacillin-tazobactam (ZOSYN) 3.375 g in iso-osmotic dextrose 50 ml (premix)        Ordering Provider: Breezy Felder MD    3.375 g  over 30 Minutes Intravenous Once 23 1645 23 1752    23 1542  DAPTOmycin (CUBICIN) 300 mg in sodium chloride 0.9 % 50 mL IVPB        Note to Pharmacy: MRSA w/ restriction from Vanco per ID.   Ordering Provider: Kings Herrera MD    300 mg  100 mL/hr over 30 Minutes Intravenous Once 23 1600 23 1801            Review of Systems:  See HPI    Physical Exam:   Vital Signs  Temp (24hrs), Av °F (36.7 °C), Min:97.6 °F (36.4 °C), Max:98.4 °F (36.9 °C)    Temp  Min: 97.6 °F (36.4 °C)  Max: 98.4 °F (36.9 °C)  BP  Min: 136/71  Max: 151/85  Pulse  Min: 63  Max: 76  Resp  Min: 14  Max: 16  SpO2  Min: 89 %  Max: 100 %    GENERAL: She remains much more alert and responsive.  HEENT: No labial ulcers    NECK: Supple   LYMPH: No cervical, axillary or inguinal lymphadenopathy.  HEART: RRR; No murmur, rubs, gallops.   LUNGS: .Decreased breath sounds at the right base with crackles.  ABDOMEN: Soft,  nontender, nondistended.  No rebound or guarding. NO mass or HSM.  EXT:  1-2+ bilateral lower extremity edema.  She has bilateral 1.5 cm heel pressure ulcers.  The right-sided ulcer has decreased surrounding erythema today.  These are non-stageable but may be stage III.  She has a right lateral fourth toe ulcer which is 0..75 x .4 cm  with some decreased erythema and central slough.  This appears to be 3 mm deep.  MSK: No joint effusions or erythema  SKIN: No diffuse rash present.    NEURO: She is more alert and arousable.    Laboratory Data    Results from last 7 days   Lab Units 04/19/23  0428 04/18/23  0349 04/17/23  1517   WBC 10*3/mm3 7.25 6.37 8.08   HEMOGLOBIN g/dL 10.6* 10.9* 11.7*   HEMATOCRIT % 34.9 36.3 39.3   PLATELETS 10*3/mm3 200 199 236     Results from last 7 days   Lab Units 04/20/23  0451   SODIUM mmol/L 148*   POTASSIUM mmol/L 3.8   CHLORIDE mmol/L 106   CO2 mmol/L 35.0*   BUN mg/dL 67*   CREATININE mg/dL 1.33*   GLUCOSE mg/dL 167*   CALCIUM mg/dL 7.8*     Results from last 7 days   Lab Units 04/20/23  0451   ALK PHOS U/L 139*   BILIRUBIN mg/dL 0.8   ALT (SGPT) U/L 11   AST (SGOT) U/L 21         Results from last 7 days   Lab Units 04/17/23  1517   CRP mg/dL 8.02*     Results from last 7 days   Lab Units 04/17/23  1517   LACTATE mmol/L 1.1     Results from last 7 days   Lab Units 04/17/23  1517   CK TOTAL U/L 106         Estimated Creatinine Clearance: 34.6 mL/min (A) (by C-G formula based on SCr of 1.33 mg/dL (H)).      Microbiology:  No results found for: ACANTHNAEG, AFBCX, BPERTUSSISCX, BLOODCX  No results found for: BCIDPCR, CXREFLEX, CSFCX, CULTURETIS  No results found for: CULTURES, HSVCX, URCX  No results found for: EYECULTURE, GCCX, HSVCULTURE, LABHSV  No results found for: LEGIONELLA, MRSACX, MUMPSCX, MYCOPLASCX  No results found for: NOCARDIACX, STOOLCX  No results found for: THROATCX, UNSTIMCULT, URINECX, CULTURE, VZVCULTUR  Wound Culture   Date Value Ref Range Status   04/12/2023  Moderate growth (3+) Staphylococcus aureus, MRSA (A)  Final     Comment:       Methicillin resistant Staphylococcus aureus, Patient may be an isolation risk.           Radiology:  Imaging Results (Last 72 Hours)     Procedure Component Value Units Date/Time    XR Chest 1 View [[id number]] Collected: 04/20/23 0835     Updated: 04/20/23 0900    Narrative:      XR CHEST 1 VW    Date of Exam: 4/20/2023 8:12 AM EDT    Indication: pulmonary edema.    Comparison: April 19, 2023    Findings:    There are small right and moderate left pleural effusions with bibasilar opacities. The heart and mediastinal contours appear normal. The pulmonary vasculature appears normal. There are degenerative changes along the left shoulder.      Impression:      Impression:  1. Small right and moderate left pleural effusions.  2. Bibasal opacities, which could reflect atelectasis or pneumonia.    Electronically Signed: Jah Champion    4/20/2023 8:57 AM EDT    Workstation ID: [id number]    XR Chest 1 View [[id number]] Collected: 04/19/23 1539     Updated: 04/19/23 1543    Narrative:      XR CHEST 1 VW    Date of Exam: 4/19/2023 3:08 PM EDT    Indication: Dyspnea.    Comparison: 4/19/2023    Findings:  The heart appears enlarged. There is indistinctness of the pulmonary vasculature. Small to moderate-sized bilateral pleural effusions are present. No pneumothorax. Patchy airspace disease seen within the lung bases bilaterally. No acute osseous   abnormality identified.      Impression:      Impression:  New/worsening bibasilar airspace disease with small to moderate-sized bilateral pleural effusions with underlying mild pulmonary edema pattern.      Electronically Signed: Karen Rey    4/19/2023 3:40 PM EDT    Workstation ID: [id number]    US Thoracentesis [[id number]] Collected: 04/19/23 1353    Specimen: Body Fluid Updated: 04/19/23 1356    Narrative:      US THORACENTESIS                                                            History: US  THORACENTESIS                                                    : Abdirashid Jose MD.    Assistant:  None.                                                                                  Modality: Ultrasound                                                                                                               Sedation: None.     Anesthesia: Lidocaine local infiltration..              Estimated blood loss:  < 5 cc.             Technique:   Discussion of risks, benefits, and alternatives were made with the patient. The patient expressed understanding and agreed to proceed. Informed written consent was obtained. A universal timeout was performed prior to starting the procedure. Maximal   sterile precautions were utilized. The procedure room personnel used personal protective equipment. The operators additionally used sterile surgical gloves.    A preliminary ultrasonography was performed. It showed a pleural effusion. A low intercostal access site was selected for access. Pertinent ultrasound images were stored to the PACS for documentation. The patient position was optimized for the   performance of thoracentesis. The access site was sterilely prepped and draped. Local anesthesia was administered. A catheter over the needle system was advanced into the pleura. Straw colored fluid was aspirated and the plastic catheter advanced into   the pleural space. The catheter was then connected to a fluid recovery system.     Endpoint of thoracentesis: Chest pain    At the end of the procedure, the catheter was withdrawn and an aseptic dressing applied. The patient tolerated the procedure well.     Postprocedure chest x-ray showed no pneumothorax.    After uneventful recovery recovery, the patient was discharged from the department in stable condition.               Complications: None immediate.    Specimen: Sent to the lab.                                                                    Impression:       Impression:     Successful ultrasound-guided thoracentesis of right pleural effusion with recovery of 1.35 liters of pleural fluid.                                                               Thank you for the opportunity to assist in the care of your patient.        Electronically Signed: Abdirashid Jose    4/19/2023 1:55 PM EDT    Workstation ID: [id number]    XR Chest 1 View [[id number]] Collected: 04/19/23 1255     Updated: 04/19/23 1301    Narrative:      XR CHEST 1 VW    Date of Exam: 4/19/2023 12:18 PM EDT    Indication: right sided thoracentesis.    Comparison: April 19, 2023    Findings:  The heart looks enlarged. There is an improved appearance of the right basilar area. There is some haziness at the right base that could reflect small right pleural effusion and/or some underlying atelectasis. There remains density in the left lower   chest which could relate to pleural effusion and underlying atelectasis. There is no definite pneumothorax.      Impression:      Impression:  1.Improved appearance to the right basilar area that could relate to interval thoracentesis. There as probably some residual effusion and atelectasis at the right base.  2.Left pleural effusion and atelectasis is again noted.  3.Cardiomegaly      Electronically Signed: Fam Hunt MD    4/19/2023 12:58 PM EDT    Workstation ID: [id number]    XR Chest 1 View [[id number]] Collected: 04/19/23 0739     Updated: 04/19/23 0742    Narrative:      XR CHEST 1 VW    Date of Exam: 4/19/2023 3:30 AM EDT    Indication: DYSPNEA, ON EXERTION    Comparison: CT chest 4/17/2023. Chest radiograph 4/17/2023.    Findings:  Stable moderate right and small left pleural effusions with overlying airspace disease/atelectasis. No pneumothorax or new lung opacity. Cardiac silhouette is partially obscured, but appears unchanged. No acute osseous abnormalities.      Impression:      Impression:  Stable bibasilar airspace disease/atelectasis overlying moderate right  and small left pleural effusions.    Electronically Signed: Jerry Duron    4/19/2023 7:39 AM EDT    Workstation ID: [id number]      I read her radiographic studies.      Impression:   1.  MRSA right foot wound infection-with cellulitis of the right fourth toe and bilateral decubitus heel ulcers.  This has occurred in the setting of severe peripheral vascular disease and severe debility.  These will be very difficult to heal.  She is currently on daptomycin therapy.  Her cellulitis has improved.  I will plan to switch her to oral minocycline soon.  2.  Pulmonary edema-She had flash pulmonary edema after her thoracentesis with removal of over 1.3 L of right pleural fluid.  She has now improved today.  3.  Acute hypoxic respiratory failure-secondary to pulmonary edema  4.  Acute on chronic systolic/diastolic heart failure  5.  Paroxysmal atrial fibrillation  6.  History of paroxysmal atrial fibrillation  7.  Severe peripheral vascular disease  8.  Severe debility  9.  Type 2 diabetes mellitus-this places her at increased risk for recurrent infections  10.  Stage III chronic kidney disease  11.  Recent Covid 19 infection-1/28/23.  This has contributed to her debility    PLAN/RECOMMENDATIONS:   1.  Continue daptomycin  2.  Continue foot wound care  3.  Possible discharge soon    She has significantly improved over the last 2 days.  She may be able to discharge soon.  I will plan for her to discharge on oral minocycline.  I discussed her complex situation with Dr. Felder today.       Stef Pete MD  4/21/2023  16:54 EDT

## 2023-04-21 NOTE — PROGRESS NOTES
[facility] Medicine Services  PROGRESS NOTE    Patient Name: Susan Anderson  : 1940  MRN: [id number]    Date of Admission: 2023  Primary Care Physician: Arleen Doherty MD    Subjective   Subjective     CC: AMS    HPI: Up in bed. States dyspnea much better. Edema better. Family at bedside. Some nausea with food choices this AM.    Review of Systems   Constitutional: Positive for activity change and fatigue.   HENT: Negative.  Negative for trouble swallowing.    Respiratory: Negative for chest tightness and shortness of breath.    Cardiovascular: Positive for leg swelling.   Gastrointestinal: Positive for nausea.   Genitourinary: Negative.          Vital Signs:   Temp:  [96.8 °F (36 °C)-98.4 °F (36.9 °C)] 98.1 °F (36.7 °C)  Heart Rate:  [63-76] 76  Resp:  [14-18] 14  BP: (125-151)/(52-85) 148/66  Flow (L/min):  [3-4] 3     Physical Exam:  NAD, alert and oriented  OP clear, dry MM  Neck supple  No LAD  RRR  Improved BS B  +BS, soft  GARG  Normal affect  No change from     Results Reviewed:  LAB RESULTS:      Lab 23  0451 23  0840 23  0428 23  0349 23  1517   WBC  --   --  7.25 6.37 8.08   HEMOGLOBIN  --   --  10.6* 10.9* 11.7*   HEMATOCRIT  --   --  34.9 36.3 39.3   PLATELETS  --   --  200 199 236   NEUTROS ABS  --   --   --  4.16 6.11   IMMATURE GRANS (ABS)  --   --   --  0.67* 0.51*   LYMPHS ABS  --   --   --  0.86 0.82   MONOS ABS  --   --   --  0.59 0.58   EOS ABS  --   --   --  0.07 0.04   MCV  --   --  99.7* 99.7* 101.6*   CRP  --   --   --   --  8.02*   PROCALCITONIN  --   --   --   --  0.13   LACTATE  --   --   --   --  1.1   *  --   --   --  527*   PROTIME  --  18.9*  --   --   --    D DIMER QUANT  --   --   --   --  2.10*         Lab 23  0451 23  0428 23  0543 23  1841 23  1517   SODIUM 148* 149* 148*  --  142   POTASSIUM 3.8 4.3 4.9  --  5.7*   CHLORIDE 106 104 103  --  102   CO2 35.0* 30.0*  26.0  --  31.0*   ANION GAP 7.0 15.0 19.0*  --  9.0   BUN 67* 71* 76*  --  80*   CREATININE 1.33* 1.39* 1.40*  --  1.63*   EGFR 39.5* 37.5* 37.2*  --  31.0*   GLUCOSE 167* 216* 160*  --  289*   CALCIUM 7.8* 8.3* 8.6  --  8.4*   MAGNESIUM  --   --   --   --  2.1   TSH  --   --   --  3.640  --          Lab 04/20/23  0451 04/19/23  0428 04/17/23  1517   TOTAL PROTEIN 4.4* 4.9* 5.9*   ALBUMIN 2.3* 2.3* 2.8*   GLOBULIN 2.1 2.6 3.1   ALT (SGPT) 11 13 22   AST (SGOT) 21 19 40*   BILIRUBIN 0.8 0.8 0.6   ALK PHOS 139* 164* 208*         Lab 04/19/23  0840 04/17/23  2027 04/17/23  1841 04/17/23  1517   PROBNP  --   --   --  19,401.0*  18,084.0*   HSTROP T  --  310* 331* 326*   PROTIME 18.9*  --   --   --    INR 1.58*  --   --   --              Lab 04/17/23  1517   FERRITIN 63.04         Lab 04/18/23  0651 04/18/23  0329 04/18/23  0024   FIO2 30 30 21   CARBOXYHEMOGLOBIN (VENOUS) 1.9 1.9 1.7     Brief Urine Lab Results  (Last result in the past 365 days)      Color   Clarity   Blood   Leuk Est   Nitrite   Protein   CREAT   Urine HCG        04/17/23 1528 Yellow   Clear   Trace   Negative   Negative   >=300 mg/dL (3+)                 Microbiology Results Abnormal     Procedure Component Value - Date/Time    Body Fluid Culture - Body Fluid, Pleural Cavity [[id number]] Collected: 04/19/23 1220    Lab Status: Preliminary result Specimen: Body Fluid from Pleural Cavity Updated: 04/21/23 0718     Body Fluid Culture No growth at 2 days     Gram Stain Moderate (3+) WBCs seen      No organisms seen    Blood Culture - Blood, Arm, Right [[id number]]  (Normal) Collected: 04/17/23 1553    Lab Status: Preliminary result Specimen: Blood from Arm, Right Updated: 04/20/23 1700     Blood Culture No growth at 3 days    Blood Culture - Blood, Arm, Left [[id number]]  (Normal) Collected: 04/17/23 1600    Lab Status: Preliminary result Specimen: Blood from Arm, Left Updated: 04/20/23 1700     Blood Culture No growth at 3 days          Adult  Transthoracic Echo Complete W/ Cont if Necessary Per Protocol    Result Date: 4/19/2023  •  Left ventricular systolic function is moderately decreased. Left ventricular ejection fraction appears to be 36 - 40%. •  Left ventricular diastolic function is consistent with (grade I) impaired relaxation. •  Trace to mild mitral vegetation. •  A left sided pleural effusion is noted.     XR Chest 1 View    Result Date: 4/20/2023  XR CHEST 1 VW Date of Exam: 4/20/2023 8:12 AM EDT Indication: pulmonary edema. Comparison: April 19, 2023 Findings: There are small right and moderate left pleural effusions with bibasilar opacities. The heart and mediastinal contours appear normal. The pulmonary vasculature appears normal. There are degenerative changes along the left shoulder.     Impression: Impression: 1. Small right and moderate left pleural effusions. 2. Bibasal opacities, which could reflect atelectasis or pneumonia. Electronically Signed: Jah Champion  4/20/2023 8:57 AM EDT  Workstation ID: [id number]    XR Chest 1 View    Result Date: 4/19/2023  XR CHEST 1 VW Date of Exam: 4/19/2023 3:08 PM EDT Indication: Dyspnea. Comparison: 4/19/2023 Findings: The heart appears enlarged. There is indistinctness of the pulmonary vasculature. Small to moderate-sized bilateral pleural effusions are present. No pneumothorax. Patchy airspace disease seen within the lung bases bilaterally. No acute osseous abnormality identified.     Impression: Impression: New/worsening bibasilar airspace disease with small to moderate-sized bilateral pleural effusions with underlying mild pulmonary edema pattern. Electronically Signed: Karen Rey  4/19/2023 3:40 PM EDT  Workstation ID: [id number]    XR Chest 1 View    Result Date: 4/19/2023  XR CHEST 1 VW Date of Exam: 4/19/2023 12:18 PM EDT Indication: right sided thoracentesis. Comparison: April 19, 2023 Findings: The heart looks enlarged. There is an improved appearance of the right basilar area. There  is some haziness at the right base that could reflect small right pleural effusion and/or some underlying atelectasis. There remains density in the left lower chest which could relate to pleural effusion and underlying atelectasis. There is no definite pneumothorax.     Impression: Impression: 1.Improved appearance to the right basilar area that could relate to interval thoracentesis. There as probably some residual effusion and atelectasis at the right base. 2.Left pleural effusion and atelectasis is again noted. 3.Cardiomegaly Electronically Signed: Fam Hunt MD  4/19/2023 12:58 PM EDT  Workstation ID: [id number]    US Thoracentesis    Result Date: 4/19/2023  US THORACENTESIS                                                         History: US THORACENTESIS                                                : Abdirashid Jose MD. Assistant:  None.                                                                              Modality: Ultrasound                                                                                                           Sedation: None. Anesthesia: Lidocaine local infiltration..          Estimated blood loss:  < 5 cc.         Technique: Discussion of risks, benefits, and alternatives were made with the patient. The patient expressed understanding and agreed to proceed. Informed written consent was obtained. A universal timeout was performed prior to starting the procedure. Maximal sterile precautions were utilized. The procedure room personnel used personal protective equipment. The operators additionally used sterile surgical gloves. A preliminary ultrasonography was performed. It showed a pleural effusion. A low intercostal access site was selected for access. Pertinent ultrasound images were stored to the PACS for documentation. The patient position was optimized for the performance of thoracentesis. The access site was sterilely prepped and draped. Local anesthesia was  administered. A catheter over the needle system was advanced into the pleura. Straw colored fluid was aspirated and the plastic catheter advanced into the pleural space. The catheter was then connected to a fluid recovery system. Endpoint of thoracentesis: Chest pain At the end of the procedure, the catheter was withdrawn and an aseptic dressing applied. The patient tolerated the procedure well. Postprocedure chest x-ray showed no pneumothorax. After uneventful recovery recovery, the patient was discharged from the department in stable condition.           Complications: None immediate. Specimen: Sent to the lab.                                                                  Impression: Impression:   Successful ultrasound-guided thoracentesis of right pleural effusion with recovery of 1.35 liters of pleural fluid.                                                           Thank you for the opportunity to assist in the care of your patient. Electronically Signed: Abdirashid Jose  4/19/2023 1:55 PM EDT  Workstation ID: [id number]    Doppler Ankle Brachial Index Single Level CAR    Result Date: 4/19/2023  •  Right Conclusion: The right HOMER is unable to be assessed due to vessel incompressibility. Unable to assess digital ischemia.  Waveforms are difficult to assess due to A-fib and PVCs.  Reported palpable pulses. •  Left Conclusion: The left HOMER is unable to be assessed due to vessel incompressibility.  Possible severe digital ischemia. Waveforms are difficult to assess due to A-fib and PVCs.  Reported palpable pulses. •  Findings overall are inconclusive.  Alternative evaluation methods such as an arterial duplex ultrasound or CT angiogram with runoff could be considered if clinically warranted.       Results for orders placed during the hospital encounter of 04/17/23    Adult Transthoracic Echo Complete W/ Cont if Necessary Per Protocol    Interpretation Summary  •  Left ventricular systolic function is moderately  decreased. Left ventricular ejection fraction appears to be 36 - 40%.  •  Left ventricular diastolic function is consistent with (grade I) impaired relaxation.  •  Trace to mild mitral vegetation.  •  A left sided pleural effusion is noted.      Current medications:  Scheduled Meds:apixaban, 2.5 mg, Oral, Q12H  atorvastatin, 40 mg, Oral, Nightly  bumetanide, 1 mg, Intravenous, Q12H  clopidogrel, 75 mg, Oral, Daily  DAPTOmycin, 4 mg/kg (Adjusted), Intravenous, Q48H  famotidine, 20 mg, Oral, Daily  insulin detemir, 5 Units, Subcutaneous, Daily  insulin lispro, 0-7 Units, Subcutaneous, TID With Meals  losartan, 50 mg, Oral, Q24H  metoprolol succinate XL, 25 mg, Oral, Q24H  polyethylene glycol, 17 g, Oral, Daily  senna-docusate sodium, 2 tablet, Oral, BID  sertraline, 50 mg, Oral, Daily      Continuous Infusions:Pharmacy Consult - Pharmacy to dose,       PRN Meds:.•  senna-docusate sodium **AND** polyethylene glycol **AND** bisacodyl **AND** bisacodyl  •  clonazePAM  •  nitroglycerin  •  ondansetron  •  Pharmacy Consult - Pharmacy to dose  •  sodium chloride  •  traMADol    Assessment & Plan   Assessment & Plan     Active Hospital Problems    Diagnosis  POA   • **Cellulitis of right foot due to methicillin-resistant Staphylococcus aureus [L03.115, B95.62]  Yes   • Acute respiratory failure with hypoxia and hypercarbia [J96.01, J96.02]  Unknown   • T2DM  [E11.9]  Yes   • Paroxysmal atrial fibrillation [I48.0]  Yes   • Chronic combined systolic and diastolic heart failure  [I50.42]  Yes   • NICM (nonischemic cardiomyopathy) ([facility]) [I42.8]  Yes   • Dyslipidemia [E78.5]  Yes   • Mitral valve disease [I05.9]  Yes   • Acute on chronic systolic CHF (congestive heart failure) [I50.23]  Yes   • Diabetic foot ulcer [E11.621, L97.509]  Yes   • PVD (peripheral vascular disease) ([facility]) [I73.9]  Yes   • Diabetes mellitus with neuropathy [E11.40]  Yes   • Stage 3a chronic kidney disease [N18.31]  Yes   • Essential hypertension [I10]   Yes      Resolved Hospital Problems   No resolved problems to display.        Brief Hospital Course to date:  Susan Anderson is a 82 y.o. chronically ill female w/ HTN, HLD, CKD3, CAD, PAD, HFrEF (41-45%), PUD/GIB, pAfib, DM2, with 6 admissions since August 2022 (predominantly for CHF). She was admitted 1/3-1/20 after PEA arrest requiring CPR after receiving propofol for EGD; and then COVID and acute renal failure requiring initiation of dialysis. Since then she has been battling bilateral heel wounds and been following with [facility] and Dr. Finney and outpatient PT. She has had peripheral stent placed as well.     Metabolic encephalopathy  -NIPPV as needed  -monitor progress, better     A/C HFrEF- ED 40%  Bilateral pleural effusions  -diuresis with bumex, IV for now per cardiology, much better  -monitor electrolytes  -hyperkalemia better  -ask for cardiology assistance, following    B effusions R>L  -s/p R thoracentesis with over 1L removed  -better overall, continue diuretic per cardiology  -no plans for L effusion at this time  -symptoms better overall    Hx of PAF  -Eliquis    Hx of PUD/GIB  -PPI allergy listed  -pepcid     Chronic bilateral heel wounds  -followed by Dr. Finney/[facility]  -on daptomycin  -wound care  -outpatient follow up with vascular surgery     Deconditioned  -PT/OT     T2DM  -SSI, add basal     DVT prophylaxis: Eliquis    Expected Discharge Location and Transportation: Rehab  Expected Discharge   Expected Discharge Date and Time     Expected Discharge Date Expected Discharge Time    Apr 24, 2023            DVT prophylaxis:  Medical DVT prophylaxis orders are present.     AM-PAC 6 Clicks Score (PT): 12 (04/20/23 0800)    CODE STATUS:   Code Status and Medical Interventions:   Ordered at: 04/17/23 1800     Code Status (Patient has no pulse and is not breathing):    No CPR (Do Not Attempt to Resuscitate)     Medical Interventions (Patient has pulse or is breathing):    Full       Breezy Felder,  MD  04/21/23

## 2023-04-21 NOTE — PROGRESS NOTES
CTS Progress Note       LOS: 4 days   Patient Care Team:  Arleen Doherty MD as PCP - General (Internal Medicine)  Flory Sauer APRN (Nurse Practitioner)  Janeth Lam MD as Consulting Physician (Endocrinology)  Anjum Ceballos MD as Consulting Physician (Cardiology)    Chief Complaint: Cellulitis of right foot due to methicillin-resistant Staphylococcus aureus    Vital Signs:  Temp:  [96.8 °F (36 °C)-98.4 °F (36.9 °C)] 98.1 °F (36.7 °C)  Heart Rate:  [63-75] 75  Resp:  [14-18] 14  BP: (125-151)/(52-85) 148/66    Physical Exam: Unchanged       Results:   Results from last 7 days   Lab Units 04/19/23  0428   WBC 10*3/mm3 7.25   HEMOGLOBIN g/dL 10.6*   HEMATOCRIT % 34.9   PLATELETS 10*3/mm3 200     Results from last 7 days   Lab Units 04/20/23  0451   SODIUM mmol/L 148*   POTASSIUM mmol/L 3.8   CHLORIDE mmol/L 106   CO2 mmol/L 35.0*   BUN mg/dL 67*   CREATININE mg/dL 1.33*   GLUCOSE mg/dL 167*   CALCIUM mg/dL 7.8*           Imaging Results (Last 24 Hours)     Procedure Component Value Units Date/Time    XR Chest 1 View [[id number]] Collected: 04/20/23 0835     Updated: 04/20/23 0900    Narrative:      XR CHEST 1 VW    Date of Exam: 4/20/2023 8:12 AM EDT    Indication: pulmonary edema.    Comparison: April 19, 2023    Findings:    There are small right and moderate left pleural effusions with bibasilar opacities. The heart and mediastinal contours appear normal. The pulmonary vasculature appears normal. There are degenerative changes along the left shoulder.      Impression:      Impression:  1. Small right and moderate left pleural effusions.  2. Bibasal opacities, which could reflect atelectasis or pneumonia.    Electronically Signed: Jah Champion    4/20/2023 8:57 AM EDT    Workstation ID: [id number]          Assessment      Cellulitis of right foot due to methicillin-resistant Staphylococcus aureus    Diabetic foot ulcer    PVD (peripheral vascular disease) (HCC)    Diabetes mellitus with  neuropathy    Essential hypertension    Stage 3a chronic kidney disease    Acute on chronic systolic CHF (congestive heart failure)    Mitral valve disease    NICM (nonischemic cardiomyopathy) (HCC)    Dyslipidemia    Chronic combined systolic and diastolic heart failure     Paroxysmal atrial fibrillation    T2DM     Acute respiratory failure with hypoxia and hypercarbia    I agree with Dr. Forrest's assessment.  No plan for intervention on this admission Dr. Hernandez to return this weekend    Plan   As above    Please note that portions of this note were completed with a voice recognition program. Efforts were made to edit the dictations, but occasionally words are mistranscribed.    Mehran White MD  04/21/23  07:53 EDT

## 2023-04-22 LAB
ANION GAP SERPL CALCULATED.3IONS-SCNC: 11 MMOL/L (ref 5–15)
BACTERIA FLD CULT: NORMAL
BACTERIA SPEC AEROBE CULT: NORMAL
BACTERIA SPEC AEROBE CULT: NORMAL
BACTERIA UR QL AUTO: ABNORMAL /HPF
BILIRUB UR QL STRIP: NEGATIVE
BUN SERPL-MCNC: 67 MG/DL (ref 8–23)
BUN/CREAT SERPL: 49.3 (ref 7–25)
CALCIUM SPEC-SCNC: 8.6 MG/DL (ref 8.6–10.5)
CHLORIDE SERPL-SCNC: 105 MMOL/L (ref 98–107)
CLARITY UR: ABNORMAL
CO2 SERPL-SCNC: 34 MMOL/L (ref 22–29)
COLOR UR: YELLOW
CREAT SERPL-MCNC: 1.36 MG/DL (ref 0.57–1)
EGFRCR SERPLBLD CKD-EPI 2021: 38.5 ML/MIN/1.73
GLUCOSE BLDC GLUCOMTR-MCNC: 144 MG/DL (ref 70–130)
GLUCOSE BLDC GLUCOMTR-MCNC: 150 MG/DL (ref 70–130)
GLUCOSE BLDC GLUCOMTR-MCNC: 319 MG/DL (ref 70–130)
GLUCOSE SERPL-MCNC: 134 MG/DL (ref 65–99)
GLUCOSE UR STRIP-MCNC: ABNORMAL MG/DL
GRAM STN SPEC: NORMAL
GRAM STN SPEC: NORMAL
HGB UR QL STRIP.AUTO: ABNORMAL
HYALINE CASTS UR QL AUTO: ABNORMAL /LPF
KETONES UR QL STRIP: ABNORMAL
LEUKOCYTE ESTERASE UR QL STRIP.AUTO: ABNORMAL
NITRITE UR QL STRIP: NEGATIVE
PH UR STRIP.AUTO: <=5 [PH] (ref 5–8)
POTASSIUM SERPL-SCNC: 3.9 MMOL/L (ref 3.5–5.2)
PROT UR QL STRIP: ABNORMAL
RBC # UR STRIP: ABNORMAL /HPF
REF LAB TEST METHOD: ABNORMAL
SODIUM SERPL-SCNC: 150 MMOL/L (ref 136–145)
SP GR UR STRIP: 1.02 (ref 1–1.03)
SQUAMOUS #/AREA URNS HPF: ABNORMAL /HPF
STARCH GRANULES URNS QL MICRO: ABNORMAL /HPF
TRANS CELLS #/AREA URNS HPF: ABNORMAL /HPF
UROBILINOGEN UR QL STRIP: ABNORMAL
WBC # UR STRIP: ABNORMAL /HPF
YEAST URNS QL MICRO: ABNORMAL /HPF

## 2023-04-22 PROCEDURE — 25010000002 ONDANSETRON PER 1 MG: Performed by: HOSPITALIST

## 2023-04-22 PROCEDURE — 81001 URINALYSIS AUTO W/SCOPE: CPT | Performed by: HOSPITALIST

## 2023-04-22 PROCEDURE — 82962 GLUCOSE BLOOD TEST: CPT

## 2023-04-22 PROCEDURE — 99232 SBSQ HOSP IP/OBS MODERATE 35: CPT | Performed by: HOSPITALIST

## 2023-04-22 PROCEDURE — 97535 SELF CARE MNGMENT TRAINING: CPT

## 2023-04-22 PROCEDURE — 80048 BASIC METABOLIC PNL TOTAL CA: CPT | Performed by: HOSPITALIST

## 2023-04-22 PROCEDURE — 87186 SC STD MICRODIL/AGAR DIL: CPT | Performed by: HOSPITALIST

## 2023-04-22 PROCEDURE — 97597 DBRDMT OPN WND 1ST 20 CM/<: CPT

## 2023-04-22 PROCEDURE — 87077 CULTURE AEROBIC IDENTIFY: CPT | Performed by: HOSPITALIST

## 2023-04-22 PROCEDURE — 63710000001 INSULIN DETEMIR PER 5 UNITS: Performed by: HOSPITALIST

## 2023-04-22 PROCEDURE — 25010000002 PROCHLORPERAZINE 10 MG/2ML SOLUTION: Performed by: HOSPITALIST

## 2023-04-22 PROCEDURE — 63710000001 INSULIN LISPRO (HUMAN) PER 5 UNITS: Performed by: INTERNAL MEDICINE

## 2023-04-22 PROCEDURE — 97110 THERAPEUTIC EXERCISES: CPT

## 2023-04-22 PROCEDURE — 87086 URINE CULTURE/COLONY COUNT: CPT | Performed by: HOSPITALIST

## 2023-04-22 RX ORDER — PROCHLORPERAZINE EDISYLATE 5 MG/ML
5 INJECTION INTRAMUSCULAR; INTRAVENOUS EVERY 6 HOURS PRN
Status: DISCONTINUED | OUTPATIENT
Start: 2023-04-22 | End: 2023-04-28 | Stop reason: HOSPADM

## 2023-04-22 RX ORDER — PROCHLORPERAZINE MALEATE 5 MG/1
5 TABLET ORAL EVERY 6 HOURS PRN
Status: DISCONTINUED | OUTPATIENT
Start: 2023-04-22 | End: 2023-04-28 | Stop reason: HOSPADM

## 2023-04-22 RX ORDER — PROCHLORPERAZINE 25 MG
25 SUPPOSITORY, RECTAL RECTAL EVERY 12 HOURS PRN
Status: DISCONTINUED | OUTPATIENT
Start: 2023-04-22 | End: 2023-04-28 | Stop reason: HOSPADM

## 2023-04-22 RX ADMIN — SERTRALINE HYDROCHLORIDE 50 MG: 50 TABLET ORAL at 09:15

## 2023-04-22 RX ADMIN — Medication 10 ML: at 21:36

## 2023-04-22 RX ADMIN — NYSTATIN: 100000 POWDER TOPICAL at 21:36

## 2023-04-22 RX ADMIN — APIXABAN 2.5 MG: 2.5 TABLET, FILM COATED ORAL at 09:15

## 2023-04-22 RX ADMIN — ONDANSETRON 4 MG: 2 INJECTION INTRAMUSCULAR; INTRAVENOUS at 06:02

## 2023-04-22 RX ADMIN — LOSARTAN POTASSIUM 50 MG: 50 TABLET, FILM COATED ORAL at 09:15

## 2023-04-22 RX ADMIN — BUMETANIDE 1 MG: 0.25 INJECTION, SOLUTION INTRAMUSCULAR; INTRAVENOUS at 21:35

## 2023-04-22 RX ADMIN — NYSTATIN: 100000 POWDER TOPICAL at 09:18

## 2023-04-22 RX ADMIN — CLOPIDOGREL BISULFATE 75 MG: 75 TABLET ORAL at 09:15

## 2023-04-22 RX ADMIN — INSULIN LISPRO 5 UNITS: 100 INJECTION, SOLUTION INTRAVENOUS; SUBCUTANEOUS at 17:05

## 2023-04-22 RX ADMIN — INSULIN DETEMIR 5 UNITS: 100 INJECTION, SOLUTION SUBCUTANEOUS at 09:18

## 2023-04-22 RX ADMIN — APIXABAN 2.5 MG: 2.5 TABLET, FILM COATED ORAL at 21:35

## 2023-04-22 RX ADMIN — PROCHLORPERAZINE EDISYLATE 5 MG: 5 INJECTION INTRAMUSCULAR; INTRAVENOUS at 09:15

## 2023-04-22 RX ADMIN — ATORVASTATIN CALCIUM 40 MG: 40 TABLET, FILM COATED ORAL at 21:35

## 2023-04-22 RX ADMIN — FAMOTIDINE 20 MG: 20 TABLET, FILM COATED ORAL at 09:15

## 2023-04-22 RX ADMIN — BUMETANIDE 1 MG: 0.25 INJECTION, SOLUTION INTRAMUSCULAR; INTRAVENOUS at 09:15

## 2023-04-22 RX ADMIN — METOPROLOL SUCCINATE 25 MG: 25 TABLET, EXTENDED RELEASE ORAL at 09:15

## 2023-04-22 NOTE — PROGRESS NOTES
[facility] Cardiothoracic Surgery In-Patient Progress Note     LOS: 5 days     Chief Complaint: PAD/BLE nonhealing ulcerations    Subjective  No acute events overnight.  Nauseated this morning    Objective  Vital Signs  Temp:  [96.9 °F (36.1 °C)-98.3 °F (36.8 °C)] 97.8 °F (36.6 °C)  Heart Rate:  [45-77] 72  Resp:  [14-18] 18  BP: (114-163)/(59-98) 158/69      Physical Exam:   General Appearance: alert, appears stated age and cooperative   Lungs: respirations regular, respirations even and respirations unlabored   Heart: normal S1, S2, no murmur, no gallop, no rub and no click   Bilateral heel ulcers  Exam is unchanged  Results     Results from last 7 days   Lab Units 04/19/23  0428   WBC 10*3/mm3 7.25   HEMOGLOBIN g/dL 10.6*   HEMATOCRIT % 34.9   PLATELETS 10*3/mm3 200     Results from last 7 days   Lab Units 04/22/23  0313   SODIUM mmol/L 150*   POTASSIUM mmol/L 3.9   CHLORIDE mmol/L 105   CO2 mmol/L 34.0*   BUN mg/dL 67*   CREATININE mg/dL 1.36*   GLUCOSE mg/dL 134*   CALCIUM mg/dL 8.6     Imaging Results (Last 24 Hours)     ** No results found for the last 24 hours. **        HOMER: Interpretation Summary       •  Right Conclusion: The right HOMER is unable to be assessed due to vessel incompressibility. Unable to assess digital ischemia.  Waveforms are difficult to assess due to A-fib and PVCs.  Reported palpable pulses.  •  Left Conclusion: The left HOMER is unable to be assessed due to vessel incompressibility.  Possible severe digital ischemia. Waveforms are difficult to assess due to A-fib and PVCs.  Reported palpable pulses.  •  Findings overall are inconclusive.  Alternative evaluation methods such as an arterial duplex ultrasound or CT angiogram with runoff could be considered if clinically warranted.      Assessment    Cellulitis of right foot due to methicillin-resistant Staphylococcus aureus    Diabetic foot ulcer    PVD (peripheral vascular disease) (HCC)    Diabetes mellitus with neuropathy     Essential hypertension    Stage 3a chronic kidney disease    Acute on chronic systolic CHF (congestive heart failure)    Mitral valve disease    NICM (nonischemic cardiomyopathy) (HCC)    Dyslipidemia    Chronic combined systolic and diastolic heart failure     Paroxysmal atrial fibrillation    T2DM     Acute respiratory failure with hypoxia and hypercarbia      Plan   -ABIs noted above  -No plan for surgical intervention during this admission  -Continue with medical optimization and diuresis  -Follow-up in 1 to 2 weeks in office after discharge      Ander Paris PA-C  04/22/23  11:43 EDT

## 2023-04-22 NOTE — PROGRESS NOTES
[facility] Medicine Services  PROGRESS NOTE    Patient Name: Susan Anderson  : 1940  MRN: [id number]    Date of Admission: 2023  Primary Care Physician: Arleen Doherty MD    Subjective   Subjective     CC: AMS    HPI: Up in bed. Dyspnea remains better. Acute nausea with sweats. S/P BM x 2 yesterday. No new cough/congestion. Denies dysuria.    Review of Systems   Constitutional: Positive for activity change and fatigue.   HENT: Negative.  Negative for trouble swallowing.    Respiratory: Negative for chest tightness and shortness of breath.    Cardiovascular: Negative for leg swelling.   Gastrointestinal: Positive for nausea.   Genitourinary: Negative.          Vital Signs:   Temp:  [96.9 °F (36.1 °C)-98.3 °F (36.8 °C)] 98.1 °F (36.7 °C)  Heart Rate:  [45-77] 77  Resp:  [14-18] 18  BP: (114-163)/(59-98) 143/76  Flow (L/min):  [2-3] (P) 3     Physical Exam:  NAD, alert and oriented  OP clear, dry MM  Neck supple  No LAD  RRR  Improved BS B, still remains decreased at bases, worse on L  +BS, soft  GARG  Normal affect  No change from     Results Reviewed:  LAB RESULTS:      Lab 23  0451 23  0840 23  0428 23  0349 23  1517   WBC  --   --  7.25 6.37 8.08   HEMOGLOBIN  --   --  10.6* 10.9* 11.7*   HEMATOCRIT  --   --  34.9 36.3 39.3   PLATELETS  --   --  200 199 236   NEUTROS ABS  --   --   --  4.16 6.11   IMMATURE GRANS (ABS)  --   --   --  0.67* 0.51*   LYMPHS ABS  --   --   --  0.86 0.82   MONOS ABS  --   --   --  0.59 0.58   EOS ABS  --   --   --  0.07 0.04   MCV  --   --  99.7* 99.7* 101.6*   CRP  --   --   --   --  8.02*   PROCALCITONIN  --   --   --   --  0.13   LACTATE  --   --   --   --  1.1   *  --   --   --  527*   PROTIME  --  18.9*  --   --   --    D DIMER QUANT  --   --   --   --  2.10*         Lab 23  0313 23  0451 23  0428 23  0543 23  1841 23  1517   SODIUM 150* 148* 149* 148*  --  142    POTASSIUM 3.9 3.8 4.3 4.9  --  5.7*   CHLORIDE 105 106 104 103  --  102   CO2 34.0* 35.0* 30.0* 26.0  --  31.0*   ANION GAP 11.0 7.0 15.0 19.0*  --  9.0   BUN 67* 67* 71* 76*  --  80*   CREATININE 1.36* 1.33* 1.39* 1.40*  --  1.63*   EGFR 38.5* 39.5* 37.5* 37.2*  --  31.0*   GLUCOSE 134* 167* 216* 160*  --  289*   CALCIUM 8.6 7.8* 8.3* 8.6  --  8.4*   MAGNESIUM  --   --   --   --   --  2.1   TSH  --   --   --   --  3.640  --          Lab 04/20/23  0451 04/19/23  0428 04/17/23  1517   TOTAL PROTEIN 4.4* 4.9* 5.9*   ALBUMIN 2.3* 2.3* 2.8*   GLOBULIN 2.1 2.6 3.1   ALT (SGPT) 11 13 22   AST (SGOT) 21 19 40*   BILIRUBIN 0.8 0.8 0.6   ALK PHOS 139* 164* 208*         Lab 04/19/23  0840 04/17/23  2027 04/17/23  1841 04/17/23  1517   PROBNP  --   --   --  19,401.0*  18,084.0*   HSTROP T  --  310* 331* 326*   PROTIME 18.9*  --   --   --    INR 1.58*  --   --   --              Lab 04/17/23  1517   FERRITIN 63.04         Lab 04/18/23  0651 04/18/23  0329 04/18/23  0024   FIO2 30 30 21   CARBOXYHEMOGLOBIN (VENOUS) 1.9 1.9 1.7     Brief Urine Lab Results  (Last result in the past 365 days)      Color   Clarity   Blood   Leuk Est   Nitrite   Protein   CREAT   Urine HCG        04/17/23 1528 Yellow   Clear   Trace   Negative   Negative   >=300 mg/dL (3+)                 Microbiology Results Abnormal     Procedure Component Value - Date/Time    Blood Culture - Blood, Arm, Right [[id number]]  (Normal) Collected: 04/17/23 1553    Lab Status: Preliminary result Specimen: Blood from Arm, Right Updated: 04/21/23 1700     Blood Culture No growth at 4 days    Blood Culture - Blood, Arm, Left [[id number]]  (Normal) Collected: 04/17/23 1600    Lab Status: Preliminary result Specimen: Blood from Arm, Left Updated: 04/21/23 1700     Blood Culture No growth at 4 days    Body Fluid Culture - Body Fluid, Pleural Cavity [[id number]] Collected: 04/19/23 1220    Lab Status: Preliminary result Specimen: Body Fluid from Pleural Cavity Updated: 04/21/23  0718     Body Fluid Culture No growth at 2 days     Gram Stain Moderate (3+) WBCs seen      No organisms seen          XR Chest 1 View    Result Date: 4/20/2023  XR CHEST 1 VW Date of Exam: 4/20/2023 8:12 AM EDT Indication: pulmonary edema. Comparison: April 19, 2023 Findings: There are small right and moderate left pleural effusions with bibasilar opacities. The heart and mediastinal contours appear normal. The pulmonary vasculature appears normal. There are degenerative changes along the left shoulder.     Impression: Impression: 1. Small right and moderate left pleural effusions. 2. Bibasal opacities, which could reflect atelectasis or pneumonia. Electronically Signed: Jah Champion  4/20/2023 8:57 AM EDT  Workstation ID: [id number]      Results for orders placed during the hospital encounter of 04/17/23    Adult Transthoracic Echo Complete W/ Cont if Necessary Per Protocol    Interpretation Summary  •  Left ventricular systolic function is moderately decreased. Left ventricular ejection fraction appears to be 36 - 40%.  •  Left ventricular diastolic function is consistent with (grade I) impaired relaxation.  •  Trace to mild mitral vegetation.  •  A left sided pleural effusion is noted.      Current medications:  Scheduled Meds:apixaban, 2.5 mg, Oral, Q12H  atorvastatin, 40 mg, Oral, Nightly  bumetanide, 1 mg, Intravenous, Q12H  clopidogrel, 75 mg, Oral, Daily  DAPTOmycin, 4 mg/kg (Adjusted), Intravenous, Q48H  famotidine, 20 mg, Oral, Daily  insulin detemir, 5 Units, Subcutaneous, Daily  insulin lispro, 0-7 Units, Subcutaneous, TID With Meals  losartan, 50 mg, Oral, Q24H  metoprolol succinate XL, 25 mg, Oral, Q24H  nystatin, , Topical, Q12H  polyethylene glycol, 17 g, Oral, Daily  senna-docusate sodium, 2 tablet, Oral, BID  sertraline, 50 mg, Oral, Daily      Continuous Infusions:Pharmacy Consult - Pharmacy to dose,       PRN Meds:.•  senna-docusate sodium **AND** polyethylene glycol **AND** bisacodyl **AND**  bisacodyl  •  clonazePAM  •  nitroglycerin  •  ondansetron  •  Pharmacy Consult - Pharmacy to dose  •  prochlorperazine **OR** prochlorperazine **OR** prochlorperazine  •  sodium chloride  •  traMADol    Assessment & Plan   Assessment & Plan     Active Hospital Problems    Diagnosis  POA   • **Cellulitis of right foot due to methicillin-resistant Staphylococcus aureus [L03.115, B95.62]  Yes   • Acute respiratory failure with hypoxia and hypercarbia [J96.01, J96.02]  Unknown   • T2DM  [E11.9]  Yes   • Paroxysmal atrial fibrillation [I48.0]  Yes   • Chronic combined systolic and diastolic heart failure  [I50.42]  Yes   • NICM (nonischemic cardiomyopathy) ([facility]) [I42.8]  Yes   • Dyslipidemia [E78.5]  Yes   • Mitral valve disease [I05.9]  Yes   • Acute on chronic systolic CHF (congestive heart failure) [I50.23]  Yes   • Diabetic foot ulcer [E11.621, L97.509]  Yes   • PVD (peripheral vascular disease) ([facility]) [I73.9]  Yes   • Diabetes mellitus with neuropathy [E11.40]  Yes   • Stage 3a chronic kidney disease [N18.31]  Yes   • Essential hypertension [I10]  Yes      Resolved Hospital Problems   No resolved problems to display.        Brief Hospital Course to date:  Susan Anderson is a 82 y.o. chronically ill female w/ HTN, HLD, CKD3, CAD, PAD, HFrEF (41-45%), PUD/GIB, pAfib, DM2, with 6 admissions since August 2022 (predominantly for CHF). She was admitted 1/3-1/20 after PEA arrest requiring CPR after receiving propofol for EGD; and then COVID and acute renal failure requiring initiation of dialysis. Since then she has been battling bilateral heel wounds and been following with [facility] and Dr. Finney and outpatient PT. She has had peripheral stent placed as well.     Metabolic encephalopathy  -NIPPV as needed  -monitor progress, better     A/C HFrEF- ED 40%  Bilateral pleural effusions  -on IV bumex, ARB, BB  -can change to PO bumex at DC per cardiology  -better overall    B effusions R>L  -s/p R thoracentesis with over 1L  removed, this admission  -better overall, continue diuretics per cardiology  -no plans for L effusion at this time, she has improved significantly  -symptoms better overall    Acute nausea  -zofrna/compazine prn  -s/p BM x 2 yesterday  -denies pain  -denies dysuria    Hx of PAF  -Eliquis    Hx of PUD/GIB  -PPI allergy listed  -pepcid     Chronic bilateral heel wounds  -followed by Dr. Finney/[facility]  -on daptomycin, can change to minocycline at DC per ID  -wound care  -outpatient follow up with vascular surgery     Deconditioned  -PT/OT     T2DM  -SSI, added basal, trending better     DVT prophylaxis: Eliquis    Expected Discharge Location and Transportation: Rehab  Expected Discharge   Expected Discharge Date and Time     Expected Discharge Date Expected Discharge Time    Apr 24, 2023            DVT prophylaxis:  Medical DVT prophylaxis orders are present.     AM-PAC 6 Clicks Score (PT): 12 (04/21/23 0895)    CODE STATUS:   Code Status and Medical Interventions:   Ordered at: 04/17/23 1800     Code Status (Patient has no pulse and is not breathing):    No CPR (Do Not Attempt to Resuscitate)     Medical Interventions (Patient has pulse or is breathing):    Full       Breezy Felder MD  04/22/23

## 2023-04-22 NOTE — PLAN OF CARE
Goal Outcome Evaluation:  Plan of Care Reviewed With: patient, daughter        Progress: no change  Outcome Evaluation: Pt completed bed mobility with ModA for sling placement for mechanical lift to chair. Pt completed oral care, face/hand washing and hair brushing with VALLE sitting up in chair. Pt gave good effort for UE pulmonary TE, cues for PLB. Continue to recommend SNF at discharge.

## 2023-04-22 NOTE — PLAN OF CARE
Goal Outcome Evaluation:  Plan of Care Reviewed With: patient        Progress: declining  Outcome Evaluation: PTchanged dressings to B heels.  Both heels with declining wound status with increase in black dry eschar concerning for arterial insufficiency.  Pt also with new eschar to R 2nd toe, R 4th toe eschar stable.  PT unable to perform any significant debridement due to dry adherent tissues.  Changed dressing to therahoney with xeroform to help soften eschar for autolytic debridement.  Pt wound benefit from vascular follow-up.    L heel:    R heel:    R toes:

## 2023-04-22 NOTE — THERAPY WOUND CARE TREATMENT
Acute Care - Wound/Debridement Treatment Note  [facility]     Patient Name: Susan Anderson  : 1940  MRN: [id number]  Today's Date: 2023                Admit Date: 2023    Visit Dx:    ICD-10-CM ICD-9-CM   1. Cellulitis of right foot due to methicillin-resistant Staphylococcus aureus  L03.115 682.7    B95.62 041.12   2. Diabetic ulcer of heel associated with type 2 diabetes mellitus, unspecified laterality, unspecified ulcer stage  E11.621 250.80    L97.409 707.14   3. Stage 3b chronic kidney disease  N18.32 585.3   4. Diabetes mellitus type 2 in obese  E11.69 250.00    E66.9 278.00   5. Elevated troponin  R77.8 790.6   6. Elevated blood pressure reading with diagnosis of hypertension  I10 401.9       Patient Active Problem List   Diagnosis   • Diabetic foot ulcer   • PVD (peripheral vascular disease) ([facility])   • Osteomyelitis   • Diabetes mellitus with neuropathy   • Essential hypertension   • Stage 3a chronic kidney disease   • Acute on chronic systolic CHF (congestive heart failure)   • Mitral valve disease   • NICM (nonischemic cardiomyopathy) ([facility])   • CAD   • Dyslipidemia   • Chronic combined systolic and diastolic heart failure    • Lumbar stenosis with neurogenic claudication   • Spondylolisthesis, lumbar region   • Gait disturbance   • Anemia   • Sleep apnea   • Paroxysmal atrial fibrillation   • T2DM    • Iron deficiency anemia, unspecified   • Cellulitis of right foot due to methicillin-resistant Staphylococcus aureus   • Acute respiratory failure with hypoxia and hypercarbia        Past Medical History:   Diagnosis Date   • Anemia    • Anxiety    • Arthritis    • Asthma  - double pneumonia    Currently on inhaler and nebulizer   • Atrial fibrillation 2022   • Brain tumor     Benign and followed at    • Cancer     cervical cancer, skin cancer   • CHF (congestive heart failure) 2021   • Chronic kidney disease Related to diabetes   • Clotting disorder 10/22    Upper GI  bleed from blood thinners aggravate ulcers   • Coronary artery disease 6/4/2021 DX for hear failure   • COVID-19 01/28/2023   • Depression 8/22   • Diabetes mellitus 30 years    Seeing Dr. Lma 1st time Aug 19   • Dizziness 07/27/2022   • Effusion of right knee 08/12/2022   • Fall 07/27/2022   • GERD (gastroesophageal reflux disease)    • Gout    • History of medical problems 8/22    AFIB   • History of staph infection 10/2021    right toe   • History of transfusion     no reactions, 1 unit, [facility]   • HL (hearing loss)     Acoustic neuroma right   • Hx of colonoscopy    • Hyperlipidemia Reference current labs x 2-3yrs approx   • Hypertension 30 years   • Hypomagnesemia 10/06/2022   • Insomnia    • Low back pain    • Migraine    • Mitral valve disease    • Mitral valve disease    • Osteomyelitis    • Peptic ulceration 10/22    Secondary to blood thinners   • Peripheral neuropathy    • Physical deconditioning 05/17/2022   • Pneumonia due to infectious organism 06/04/2021   • Pressure injury of skin of sacral region 08/21/2022   • Renal insufficiency 2021   • Sepsis 01/28/2023   • Sleep apnea    • Syncope, unspecified syncope type 10/06/2022   • Type 2 diabetes mellitus     30 years   • Weakness 07/27/2022        Past Surgical History:   Procedure Laterality Date   • ABDOMINAL HYSTERECTOMY W/SALPINGECTOMY     • AMPUTATION  Right great toe, 1st 1/3 metatarsal -Reg 4/14/21   • AORTAGRAM N/A 04/09/2021    Procedure: AORTAGRAM WITH OR WITHOUT RUNOFFS, WITH Co2;  Surgeon: Trey Forrest MD;  Location: [facility];  Service: Vascular;  Laterality: N/A;   • AORTAGRAM N/A 09/28/2022    Procedure: CO2 ANGIOGRAM, LEFT SFA ANGIOPLASTY WITH DRUG ELUTING BALLOON, LEFT SFA STENT PLACEMENT ;  Surgeon: Trey Forrest MD;  Location: [facility];  Service: Vascular;  Laterality: N/A;  FLUORO: 13.12  DOSE 162mGy  CONTRAST: Isovue 350: 15ml   • APPENDECTOMY     • BRAIN TUMOR EXCISION      laser surgery    • CARDIAC  CATHETERIZATION N/A 06/21/2021    Procedure: LEFT HEART CATH;  Surgeon: Anjum Ceballos MD;  Location:  [facility];  Service: Cardiology;  Laterality: N/A;   • CATARACT EXTRACTION W/ INTRAOCULAR LENS  IMPLANT, BILATERAL     • CHOLECYSTECTOMY     • COLONOSCOPY     • ENDOSCOPY N/A 10/07/2022    Procedure: ESOPHAGOGASTRODUODENOSCOPY;  Surgeon: Stef Veras MD;  Location:  [facility];  Service: Gastroenterology;  Laterality: N/A;   • EYE SURGERY     • FEMORAL ARTERY STENT  10/2022   • HYSTERECTOMY     • INTERVENTIONAL RADIOLOGY PROCEDURE N/A 1/15/2023    Procedure: CV INTERVENTIONAL RADIOLOGY PROCEDURE;  Surgeon: Sanket Zapata MD;  Location:  [facility];  Service: Interventional Radiology;  Laterality: N/A;   • TOE SURGERY     • TRANS METATARSAL AMPUTATION Right 04/14/2021    Procedure: AMPUTATION TRANS METATARSAL RIGHT GREAT TOE;  Surgeon: Valdez Finney MD;  Location:  [facility];  Service: Vascular;  Laterality: Right;           Wound 01/28/23 1613 Left posterior heel Pressure Injury (Active)   Wound Image   04/22/23 0845   Pressure Injury Stage U 04/22/23 0845   Dressing Appearance dressing loose 04/22/23 0845   Base black eschar;dry 04/22/23 0845   Periwound intact;dry;redness 04/22/23 0845   Periwound Temperature warm 04/22/23 0845   Periwound Skin Turgor soft 04/22/23 0845   Edges open 04/22/23 0845   Wound Length (cm) 2 cm 04/22/23 0845   Wound Width (cm) 1.8 cm 04/22/23 0845   Wound Surface Area (cm^2) 3.6 cm^2 04/22/23 0845   Drainage Amount scant 04/22/23 0845   Care, Wound cleansed with;wound cleanser;debrided;honey applied 04/22/23 0845   Dressing Care dressing applied;petroleum-based;gauze;foam;border dressing 04/22/23 0845   Periwound Care cleansed with pH balanced cleanser;dry periwound area maintained 04/22/23 0845       Wound 03/15/23 1115 Left proximal leg Skin Tear (Active)   Dressing Appearance open to air 04/22/23 0845   Base  clean;closed/resurfaced;dry;pink 04/22/23 0845   Dressing Care open to air 04/22/23 0845       Wound 03/31/23 1345 Right medial heel Fissure (Active)   Wound Image   04/22/23 0845   Pressure Injury Stage U 04/22/23 0845   Dressing Appearance intact;dried drainage 04/22/23 0845   Base black eschar;dry 04/22/23 0845   Periwound intact;dry;redness 04/22/23 0845   Periwound Temperature warm 04/22/23 0845   Periwound Skin Turgor soft 04/22/23 0845   Edges open 04/22/23 0845   Wound Length (cm) 1 cm 04/22/23 0845   Wound Width (cm) 1.2 cm 04/22/23 0845   Wound Surface Area (cm^2) 1.2 cm^2 04/22/23 0845   Drainage Amount none 04/22/23 0845   Care, Wound cleansed with;wound cleanser;debrided;honey applied 04/22/23 0845   Dressing Care dressing applied;petroleum-based;gauze;foam;border dressing 04/22/23 0845   Periwound Care cleansed with pH balanced cleanser;dry periwound area maintained 04/22/23 0845       Wound 04/11/23 1300 Right lateral fourth toe Soft Tissue Necrosis (Active)   Wound Image   04/22/23 0845   Pressure Injury Stage 3 04/21/23 1100   Dressing Appearance other (see comments) 04/21/23 1100   Closure Open to air 04/22/23 0845   Base black eschar;dry 04/22/23 0845   Periwound intact;dry;redness 04/22/23 0845   Periwound Temperature warm 04/22/23 0845   Periwound Skin Turgor soft 04/22/23 0845   Care, Wound cleansed with;wound cleanser 04/22/23 0845   Dressing Care open to air 04/22/23 0845         WOUND DEBRIDEMENT  Total area of Debridement: <1cmsq  Debridement Site 1  Location- Site 1: B heels  Selective Debridement- Site 1: Wound Surface <20cmsq  Instruments- Site 1: #15, scapel, tweezers  Excised Tissue Description- Site 1: scant, eschar  Bleeding- Site 1: none  Cross Hatching- Site 1: eschar               PT Assessment (last 12 hours)     PT Evaluation and Treatment     Row Name 04/22/23 8850          Physical Therapy Time and Intention    Subjective Information complains of;nausea/vomiting;pain  -JM      Document Type wound care;therapy note (daily note)  -     Row Name 04/22/23 0845          General Information    Patient Observations alert;cooperative;agree to therapy  -     Existing Precautions/Restrictions fall;oxygen therapy device and L/min  -     Risks Reviewed patient:;increased discomfort  -     Benefits Reviewed patient:;improve skin integrity;decrease pain  -     Barriers to Rehab medically complex;previous functional deficit  -     Row Name 04/22/23 0845          Pain    Additional Documentation Pain Scale: FACES Pre/Post-Treatment (Group)  -     Row Name 04/22/23 0845          Pain Scale: FACES Pre/Post-Treatment    Pain: FACES Scale, Pretreatment 4-->hurts little more  -     Posttreatment Pain Rating 4-->hurts little more  -     Pain Location - Side/Orientation Bilateral  -     Pain Location posterior  -     Pain Location - foot  -     Row Name 04/22/23 0845          Cognition    Affect/Mental Status (Cognition) low arousal/lethargic  -     Orientation Status (Cognition) oriented to;person;place  -     Row Name 04/22/23 0845          Wound 01/28/23 1613 Left posterior heel Pressure Injury    Wound - Properties Group Placement Date: 01/28/23  -WR Placement Time: 1613  -WR Present on Hospital Admission: Y  -WR Side: Left  -WR Orientation: posterior  -WR Location: heel  -WR Primary Wound Type: Pressure inj  -WR    Wound Image Images linked: 1  -JM     Pressure Injury Stage U  -     Dressing Appearance dressing loose  -     Base black eschar;dry  -     Periwound intact;dry;redness  -     Periwound Temperature warm  -     Periwound Skin Turgor soft  -     Edges open  -     Wound Length (cm) 2 cm  -     Wound Width (cm) 1.8 cm  -     Wound Depth (cm) --  obscured  -     Wound Surface Area (cm^2) 3.6 cm^2  -     Drainage Amount scant  -     Care, Wound cleansed with;wound cleanser;debrided;honey applied  -     Dressing Care dressing  "applied;petroleum-based;gauze;foam;border dressing  xeroform, 4\" optifoam  -     Periwound Care cleansed with pH balanced cleanser;dry periwound area maintained  -     Retired Wound - Properties Group Placement Date: 01/28/23  -WR Placement Time: 1613  -WR Present on Hospital Admission: Y  -WR Side: Left  -WR Orientation: posterior  -WR Location: heel  -WR Primary Wound Type: Pressure inj  -WR    Retired Wound - Properties Group Date first assessed: 01/28/23  -WR Time first assessed: 1613  -WR Present on Hospital Admission: Y  -WR Side: Left  -WR Location: heel  -WR Primary Wound Type: Pressure inj  -WR    Row Name 04/22/23 0845          Wound 04/11/23 1300 Right lateral fourth toe Soft Tissue Necrosis    Wound - Properties Group Placement Date: 04/11/23  - Placement Time: 1300  -MC Present on Hospital Admission: N  -MC Side: Right  -MC Orientation: lateral  -MC Location: fourth toe  -MC Primary Wound Type: Soft tissue  -MC    Wound Image Images linked: 1  -JM     Closure Open to air  -     Base black eschar;dry  -     Periwound intact;dry;redness  -     Periwound Temperature warm  -     Periwound Skin Turgor soft  -     Care, Wound cleansed with;wound cleanser  -     Dressing Care open to air  -     Retired Wound - Properties Group Placement Date: 04/11/23  - Placement Time: 1300  -MC Present on Hospital Admission: N  -MC Side: Right  -MC Orientation: lateral  -MC Location: fourth toe  -MC Primary Wound Type: Soft tissue  -MC    Retired Wound - Properties Group Date first assessed: 04/11/23  - Time first assessed: 1300  -MC Present on Hospital Admission: N  -MC Side: Right  -MC Location: fourth toe  -MC Primary Wound Type: Soft tissue  -MC    Row Name 04/22/23 0845          Wound 03/15/23 1115 Left proximal leg Skin Tear    Wound - Properties Group Placement Date: 03/15/23  - Placement Time: 1115  -MC Present on Hospital Admission: Y  -MC Side: Left  -MC Orientation: proximal  -MC " "Location: leg  -MC Primary Wound Type: Skin tear  -MC    Dressing Appearance open to air  -     Base clean;closed/resurfaced;dry;pink  -     Dressing Care open to air  -     Retired Wound - Properties Group Placement Date: 03/15/23  - Placement Time: 1115  -MC Present on Hospital Admission: Y  - Side: Left  - Orientation: proximal  - Location: leg  -MC Primary Wound Type: Skin tear  -MC    Retired Wound - Properties Group Date first assessed: 03/15/23  - Time first assessed: 1115  - Present on Hospital Admission: Y  - Side: Left  - Location: leg  -MC Primary Wound Type: Skin tear  -MC    Row Name 04/22/23 0845          Wound 03/31/23 1345 Right medial heel Fissure    Wound - Properties Group Placement Date: 03/31/23  - Placement Time: 1345  - Side: Right  - Orientation: medial  - Location: heel  - Primary Wound Type: Fissure  -    Wound Image Images linked: 1  -     Pressure Injury Stage U  -     Dressing Appearance intact;dried drainage  -     Base black eschar;dry  -     Periwound intact;dry;redness  -     Periwound Temperature warm  -     Periwound Skin Turgor soft  -     Edges open  -     Wound Length (cm) 1 cm  -     Wound Width (cm) 1.2 cm  -     Wound Depth (cm) --  obscured  -     Wound Surface Area (cm^2) 1.2 cm^2  -     Drainage Amount none  -     Care, Wound cleansed with;wound cleanser;debrided;honey applied  -     Dressing Care dressing applied;petroleum-based;gauze;foam;border dressing  xeroform, 4\" optifoam  -     Periwound Care cleansed with pH balanced cleanser;dry periwound area maintained  -     Retired Wound - Properties Group Placement Date: 03/31/23  - Placement Time: 1345  - Side: Right  - Orientation: medial  - Location: heel  - Primary Wound Type: Fissure  -JM    Retired Wound - Properties Group Date first assessed: 03/31/23  - Time first assessed: 1345  - Side: Right  - Location: heel  - Primary Wound Type: " Fissure  -    Row Name 04/22/23 0845          Plan of Care Review    Plan of Care Reviewed With patient  -     Progress declining  -     Outcome Evaluation PTchanged dressings to B heels.  Both heels with declining wound status with increase in black dry eschar concerning for arterial insufficiency.  Pt also with new eschar to R 2nd toe, R 4th toe eschar stable.  PT unable to perform any significant debridement due to dry adherent tissues.  Changed dressing to therahoney with xeroform to help soften eschar for autolytic debridement.  Pt wound benefit from vascular follow-up.  -     Row Name 04/22/23 0845          Positioning and Restraints    Pre-Treatment Position in bed  -     Post Treatment Position bed  -     In Bed notified nsg;supine;call light within reach;encouraged to call for assist  -           User Key  (r) = Recorded By, (t) = Taken By, (c) = Cosigned By    Initials Name Provider Type    Lucila Mohan, PT Physical Therapist    Shi Mane, PT Physical Therapist    Aditi Shafer, RN Registered Nurse              Physical Therapy Education     Title: PT OT SLP Therapies (In Progress)     Topic: Physical Therapy (In Progress)     Point: Mobility training (In Progress)     Learning Progress Summary           Patient Acceptance, E,TB, NR by  at 4/19/2023 1159    Acceptance, E, NR by KR at 4/18/2023 0824   Family Acceptance, E, VU by KR at 4/19/2023 0818                   Point: Home exercise program (In Progress)     Learning Progress Summary           Patient Acceptance, E, NR by KR at 4/18/2023 0824   Family Acceptance, E, VU by KR at 4/19/2023 0818                   Point: Body mechanics (In Progress)     Learning Progress Summary           Patient Acceptance, E,TB, NR by  at 4/19/2023 1159    Acceptance, E, NR by KR at 4/18/2023 0824   Family Acceptance, E, VU by KR at 4/19/2023 0818                   Point: Precautions (In Progress)     Learning Progress  Summary           Patient Acceptance, E,TB, NR by  at 4/19/2023 1159    Acceptance, E, NR by KR at 4/18/2023 0824   Family Acceptance, E, VU by KR at 4/19/2023 0818                               User Key     Initials Effective Dates Name Provider Type Discipline     09/22/22 -  Anna White, PT Physical Therapist PT    KR 01/16/23 -  Cha Jefferson, RN Registered Nurse Nurse                Recommendation and Plan  Anticipated Discharge Disposition (PT): skilled nursing facility  Planned Therapy Interventions (PT): balance training, bed mobility training, home exercise program, neuromuscular re-education, ROM (range of motion), strengthening, postural re-education, patient/family education, transfer training, wheelchair management/propulsion training  Therapy Frequency (PT): daily  Plan of Care Reviewed With: patient   Progress: declining       Progress: declining  Outcome Evaluation: PTchanged dressings to B heels.  Both heels with declining wound status with increase in black dry eschar concerning for arterial insufficiency.  Pt also with new eschar to R 2nd toe, R 4th toe eschar stable.  PT unable to perform any significant debridement due to dry adherent tissues.  Changed dressing to therahoney with xeroform to help soften eschar for autolytic debridement.  Pt wound benefit from vascular follow-up.  Plan of Care Reviewed With: patient            Time Calculation   PT Charges     Row Name 04/22/23 0845             Time Calculation    Start Time 0845  -JM      PT Goal Re-Cert Due Date 04/29/23  -JM         Untimed Charges    09584-Tprfrnnsd debridement 15  -JM         Total Minutes    Untimed Charges Total Minutes 15  -JM       Total Minutes 15  -JM            User Key  (r) = Recorded By, (t) = Taken By, (c) = Cosigned By    Initials Name Provider Type    Shi Mane, PT Physical Therapist                  Therapy Charges for Today     Code Description Service Date Service Provider Modifiers Qty     [id number] [facility] DEBRIDE OPEN WOUND UP TO 20CM 4/22/2023 Shi Cordoba, PT GP 1            PT G-Codes  Outcome Measure Options: AM-PAC 6 Clicks Basic Mobility (PT)  AM-PAC 6 Clicks Score (PT): 12  AM-PAC 6 Clicks Score (OT): 12       Shi Cordoba, PT  4/22/2023

## 2023-04-22 NOTE — THERAPY TREATMENT NOTE
Patient Name: Susan Anderson  : 1940    MRN: [id number]                              Today's Date: 2023       Admit Date: 2023    Visit Dx:     ICD-10-CM ICD-9-CM   1. Cellulitis of right foot due to methicillin-resistant Staphylococcus aureus  L03.115 682.7    B95.62 041.12   2. Diabetic ulcer of heel associated with type 2 diabetes mellitus, unspecified laterality, unspecified ulcer stage  E11.621 250.80    L97.409 707.14   3. Stage 3b chronic kidney disease  N18.32 585.3   4. Diabetes mellitus type 2 in obese  E11.69 250.00    E66.9 278.00   5. Elevated troponin  R77.8 790.6   6. Elevated blood pressure reading with diagnosis of hypertension  I10 401.9     Patient Active Problem List   Diagnosis   • Diabetic foot ulcer   • PVD (peripheral vascular disease) ([facility])   • Osteomyelitis   • Diabetes mellitus with neuropathy   • Essential hypertension   • Stage 3a chronic kidney disease   • Acute on chronic systolic CHF (congestive heart failure)   • Mitral valve disease   • NICM (nonischemic cardiomyopathy) ([facility])   • CAD   • Dyslipidemia   • Chronic combined systolic and diastolic heart failure    • Lumbar stenosis with neurogenic claudication   • Spondylolisthesis, lumbar region   • Gait disturbance   • Anemia   • Sleep apnea   • Paroxysmal atrial fibrillation   • T2DM    • Iron deficiency anemia, unspecified   • Cellulitis of right foot due to methicillin-resistant Staphylococcus aureus   • Acute respiratory failure with hypoxia and hypercarbia     Past Medical History:   Diagnosis Date   • Anemia    • Anxiety    • Arthritis    • Asthma  - double pneumonia    Currently on inhaler and nebulizer   • Atrial fibrillation 2022   • Brain tumor     Benign and followed at    • Cancer     cervical cancer, skin cancer   • CHF (congestive heart failure) 2021   • Chronic kidney disease Related to diabetes   • Clotting disorder 10/22    Upper GI bleed from blood thinners aggravate ulcers   •  Coronary artery disease 6/4/2021 DX for hear failure   • COVID-19 01/28/2023   • Depression 8/22   • Diabetes mellitus 30 years    Seeing Dr. Lam 1st time Aug 19   • Dizziness 07/27/2022   • Effusion of right knee 08/12/2022   • Fall 07/27/2022   • GERD (gastroesophageal reflux disease)    • Gout    • History of medical problems 8/22    AFIB   • History of staph infection 10/2021    right toe   • History of transfusion     no reactions, 1 unit, [facility]   • HL (hearing loss)     Acoustic neuroma right   • Hx of colonoscopy    • Hyperlipidemia Reference current labs x 2-3yrs approx   • Hypertension 30 years   • Hypomagnesemia 10/06/2022   • Insomnia    • Low back pain    • Migraine    • Mitral valve disease    • Mitral valve disease    • Osteomyelitis    • Peptic ulceration 10/22    Secondary to blood thinners   • Peripheral neuropathy    • Physical deconditioning 05/17/2022   • Pneumonia due to infectious organism 06/04/2021   • Pressure injury of skin of sacral region 08/21/2022   • Renal insufficiency 2021   • Sepsis 01/28/2023   • Sleep apnea    • Syncope, unspecified syncope type 10/06/2022   • Type 2 diabetes mellitus     30 years   • Weakness 07/27/2022     Past Surgical History:   Procedure Laterality Date   • ABDOMINAL HYSTERECTOMY W/SALPINGECTOMY     • AMPUTATION  Right great toe, 1st 1/3 metatarsal -Reg 4/14/21   • AORTAGRAM N/A 04/09/2021    Procedure: AORTAGRAM WITH OR WITHOUT RUNOFFS, WITH Co2;  Surgeon: Trey Forrest MD;  Location: [facility];  Service: Vascular;  Laterality: N/A;   • AORTAGRAM N/A 09/28/2022    Procedure: CO2 ANGIOGRAM, LEFT SFA ANGIOPLASTY WITH DRUG ELUTING BALLOON, LEFT SFA STENT PLACEMENT ;  Surgeon: Trey Forrest MD;  Location: [facility];  Service: Vascular;  Laterality: N/A;  FLUORO: 13.12  DOSE 162mGy  CONTRAST: Isovue 350: 15ml   • APPENDECTOMY     • BRAIN TUMOR EXCISION      laser surgery    • CARDIAC CATHETERIZATION N/A 06/21/2021    Procedure: LEFT HEART  CATH;  Surgeon: Anjum Ceballos MD;  Location:  [facility] CATH INVASIVE LOCATION;  Service: Cardiology;  Laterality: N/A;   • CATARACT EXTRACTION W/ INTRAOCULAR LENS  IMPLANT, BILATERAL     • CHOLECYSTECTOMY     • COLONOSCOPY     • ENDOSCOPY N/A 10/07/2022    Procedure: ESOPHAGOGASTRODUODENOSCOPY;  Surgeon: Stef Veras MD;  Location:  [facility];  Service: Gastroenterology;  Laterality: N/A;   • EYE SURGERY     • FEMORAL ARTERY STENT  10/2022   • HYSTERECTOMY     • INTERVENTIONAL RADIOLOGY PROCEDURE N/A 1/15/2023    Procedure: CV INTERVENTIONAL RADIOLOGY PROCEDURE;  Surgeon: Sanket Zapata MD;  Location: [facility] CATH INVASIVE LOCATION;  Service: Interventional Radiology;  Laterality: N/A;   • TOE SURGERY     • TRANS METATARSAL AMPUTATION Right 04/14/2021    Procedure: AMPUTATION TRANS METATARSAL RIGHT GREAT TOE;  Surgeon: Valdez Finney MD;  Location:  [facility];  Service: Vascular;  Laterality: Right;      General Information     Row Name 04/22/23 1611          OT Time and Intention    Document Type therapy note (daily note)  -OLEGARIO     Mode of Treatment occupational therapy  -OLEGARIO     Row Name 04/22/23 1611          General Information    Patient Profile Reviewed yes  -OLEGARIO     Existing Precautions/Restrictions fall;oxygen therapy device and L/min;other (see comments)  B heel wounds  -OLEGARIO     Barriers to Rehab medically complex;previous functional deficit  -OLEGARIO     Row Name 04/22/23 1611          Cognition    Orientation Status (Cognition) oriented to;person;place;verbal cues/prompts needed for orientation;time  -OLEGARIO     Row Name 04/22/23 1611          Safety Issues, Functional Mobility    Safety Issues Affecting Function (Mobility) awareness of need for assistance;friction/shear risk;insight into deficits/self-awareness;judgment;problem-solving;safety precaution awareness;safety precautions follow-through/compliance;sequencing abilities  -OLEGARIO     Impairments Affecting Function (Mobility) balance;endurance/activity  tolerance;motor planning;pain;postural/trunk control;shortness of breath;strength  -OLEGARIO     Comment, Safety Issues/Impairments (Mobility) situational confusion noted  -OLEGARIO           User Key  (r) = Recorded By, (t) = Taken By, (c) = Cosigned By    Initials Name Provider Type    Zakia Salvador OT Occupational Therapist                 Mobility/ADL's     Row Name 04/22/23 1612          Bed Mobility    Bed Mobility rolling left;rolling right  -OLEGARIO     Rolling Left [place] (Bed Mobility) moderate assist (50% patient effort);verbal cues;nonverbal cues (demo/gesture);1 person assist  -OLEGARIO     Rolling Right [place] (Bed Mobility) moderate assist (50% patient effort);verbal cues;nonverbal cues (demo/gesture);1 person assist  -OLEGARIO     Assistive Device (Bed Mobility) bed rails;draw sheet  -OLEGARIO     Comment, (Bed Mobility) able to maintain position in sidelying using bed rail with v/t cues for HP  -OLEGARIO     Row Name 04/22/23 1612          Transfers    Transfers bed-chair transfer  -OLEGARIO     Row Name 04/22/23 1612          Bed-Chair Transfer    Bed-Chair [place] (Transfers) dependent (less than 25% patient effort);2 person assist  -OLEGARIO     Assistive Device (Bed-Chair Transfers) lift device  -OLEGARIO     Comment, (Bed-Chair Transfer) Pt transferred to chair from bed via mechanical lift  -OLEGARIO     Row Name 04/22/23 1612          Functional Mobility    Functional Mobility- Ind. Level not tested  -OLEGARIO     Row Name 04/22/23 1612          Activities of Daily Living    BADL Assessment/Intervention grooming  -OLEGARIO     Row Name 04/22/23 1612          Grooming Assessment/Training    [place] Level (Grooming) hair care, combing/brushing;oral care regimen;wash face, hands;set up  -OLEGARIO     Position (Grooming) supported sitting  -OLEGARIO           User Key  (r) = Recorded By, (t) = Taken By, (c) = Cosigned By    Initials Name Provider Type    Zkaia Salvador OT Occupational Therapist               Obj/Interventions     Row Name 04/22/23 1611           Shoulder (Therapeutic Exercise)    Shoulder (Therapeutic Exercise) AROM (active range of motion)  -     Shoulder AROM (Therapeutic Exercise) bilateral;flexion;extension;scapular protraction;scapular retraction;sitting;10 repetitions;other (see comments)  UE pulmonary set  -     Row Name 04/22/23 1615          Motor Skills    Therapeutic Exercise shoulder  -           User Key  (r) = Recorded By, (t) = Taken By, (c) = Cosigned By    Initials Name Provider Type    Zakia Salvador, MUSTAPHA Occupational Therapist               Goals/Plan    No documentation.                Clinical Impression     [facility] Name 04/22/23 1616          Pain Scale: FACES Pre/Post-Treatment    Pain: FACES Scale, Pretreatment 2-->hurts little bit  -OLEGARIO     Posttreatment Pain Rating 2-->hurts little bit  -OLEGARIO     Pain Location - Side/Orientation Bilateral  -OLEGARIO     Pain Location - other (see comments)  heels  -OLEGARIO     Pre/Posttreatment Pain Comment RN aware and managing  -[facility] Name 04/22/23 1616          Plan of Care Review    Plan of Care Reviewed With patient;daughter  -     Progress no change  -     Outcome Evaluation Pt completed bed mobility with ModA for sling placement for mechanical lift to chair. Pt completed oral care, face/hand washing and hair brushing with VALLE sitting up in chair. Pt gave good effort for UE pulmonary TE, cues for PLB. Continue to recommend SNF at discharge.  -     Row Name 04/22/23 1616          Therapy Plan Review/Discharge Plan (OT)    Anticipated Discharge Disposition (OT) skilled nursing facility  -     Row Name 04/22/23 1616          Vital Signs    Pre Systolic BP Rehab 156  -OLEGARIO     Pre Treatment Diastolic BP 85  -OLEGARIO     Pretreatment Heart Rate (beats/min) 72  -OLEGARIO     Intratreatment Heart Rate (beats/min) 83  -OLEGARIO     Posttreatment Heart Rate (beats/min) 76  -OLEGARIO     Pre SpO2 (%) 100  -OLEGARIO     O2 Delivery Pre Treatment nasal cannula  -OLEGARIO     Intra SpO2 (%) 92  -OLEGARIO     O2 Delivery Intra Treatment  nasal cannula  -OLEGARIO     Post SpO2 (%) 98  -OLEGARIO     O2 Delivery Post Treatment nasal cannula  -OLEGARIO     Pre Patient Position Supine  -OLEGARIO     Intra Patient Position Side Lying  -OLEGARIO     Post Patient Position Sitting  -OLEGARIO     Row Name 04/22/23 1616          Positioning and Restraints    Pre-Treatment Position in bed  -OLEGARIO     Post Treatment Position chair  -OLEGARIO     In Chair notified nsg;reclined;call light within reach;encouraged to call for assist;with family/caregiver;waffle cushion;legs elevated;heels elevated;on mechanical lift sling  -OLEGARIO           User Key  (r) = Recorded By, (t) = Taken By, (c) = Cosigned By    Initials Name Provider Type    Zakia Salvador, MUSTAPHA Occupational Therapist               Outcome Measures     Row Name 04/22/23 1621          How much help from another is currently needed...    Putting on and taking off regular lower body clothing? 1  -OLEGARIO     Bathing (including washing, rinsing, and drying) 2  -OLEGARIO     Toileting (which includes using toilet bed pan or urinal) 1  -OLEGARIO     Putting on and taking off regular upper body clothing 2  -OLEGARIO     Taking care of personal grooming (such as brushing teeth) 3  -OLEGARIO     Eating meals 3  -OLEGARIO     AM-PAC 6 Clicks Score (OT) 12  -OLEGARIO     Row Name 04/22/23 0800          How much help from another person do you currently need...    Turning from your back to your side while in flat bed without using bedrails? 3  -BP     Moving from lying on back to sitting on the side of a flat bed without bedrails? 3  -BP     Moving to and from a bed to a chair (including a wheelchair)? 2  -BP     Standing up from a chair using your arms (e.g., wheelchair, bedside chair)? 2  -BP     Climbing 3-5 steps with a railing? 1  -BP     To walk in hospital room? 1  -BP     AM-PAC 6 Clicks Score (PT) 12  -BP     Highest level of mobility 4 --> Transferred to chair/commode  -BP     Row Name 04/22/23 1621          Functional Assessment    Outcome Measure Options AM-PAC 6 Clicks Daily Activity (OT)   -OLEGARIO           User Key  (r) = Recorded By, (t) = Taken By, (c) = Cosigned By    Initials Name Provider Type    Zakia Salvador, OT Occupational Therapist    Aminata Jimenes, RN Registered Nurse                Occupational Therapy Education     Title: PT OT SLP Therapies (In Progress)     Topic: Occupational Therapy (In Progress)     Point: ADL training (Done)     Description:   Instruct learner(s) on proper safety adaptation and remediation techniques during self care or transfers.   Instruct in proper use of assistive devices.              Learning Progress Summary           Patient Acceptance, E,TB,D, DU,NR by OLEGARIO at 4/22/2023 1621    Comment: OT POC; UE pulmonary HEP; incentive spirometer use    Acceptance, E, NR by KR at 4/18/2023 0824   Family Acceptance, E,TB,D, DU,NR by OLEGARIO at 4/22/2023 1621    Comment: OT POC; UE pulmonary HEP; incentive spirometer use    Acceptance, E, VU by KR at 4/19/2023 0818                   Point: Home exercise program (Done)     Description:   Instruct learner(s) on appropriate technique for monitoring, assisting and/or progressing therapeutic exercises/activities.              Learning Progress Summary           Patient Acceptance, E,TB,D, DU,NR by OLEGARIO at 4/22/2023 1621    Comment: OT POC; UE pulmonary HEP; incentive spirometer use    Acceptance, E, NR by KR at 4/18/2023 0824   Family Acceptance, E,TB,D, DU,NR by OLEGARIO at 4/22/2023 1621    Comment: OT POC; UE pulmonary HEP; incentive spirometer use    Acceptance, E, VU by KR at 4/19/2023 0818                   Point: Precautions (In Progress)     Description:   Instruct learner(s) on prescribed precautions during self-care and functional transfers.              Learning Progress Summary           Patient Acceptance, E, NR by KR at 4/18/2023 0824   Family Acceptance, E, VU by KR at 4/19/2023 0818                   Point: Body mechanics (In Progress)     Description:   Instruct learner(s) on proper positioning and spine alignment  during self-care, functional mobility activities and/or exercises.              Learning Progress Summary           Patient Acceptance, E, NR by KR at 4/18/2023 0824   Family Acceptance, E, VU by KR at 4/19/2023 0818                               User Key     Initials Effective Dates Name Provider Type Discipline    OLEGARIO 06/16/21 -  Zakia Jay OT Occupational Therapist OT    JOJO 01/16/23 -  Cha Jefferson, RN Registered Nurse Nurse              OT Recommendation and Plan     Plan of Care Review  Plan of Care Reviewed With: patient, daughter  Progress: no change  Outcome Evaluation: Pt completed bed mobility with ModA for sling placement for mechanical lift to chair. Pt completed oral care, face/hand washing and hair brushing with VALLE sitting up in chair. Pt gave good effort for UE pulmonary TE, cues for PLB. Continue to recommend SNF at discharge.     Time Calculation:    Time Calculation- OT     Row Name 04/22/23 1530             Time Calculation- OT    OT Start Time 1530  -OLEGARIO      OT Received On 04/22/23  -OLEGARIO         Timed Charges    91052 - OT Therapeutic Exercise Minutes 16  -OLEGARIO      90338 - OT Self Care/Mgmt Minutes 22  -OLEGARIO         Total Minutes    Timed Charges Total Minutes 38  -OLEGARIO       Total Minutes 38  -OLEGARIO            User Key  (r) = Recorded By, (t) = Taken By, (c) = Cosigned By    Initials Name Provider Type    Zakia Salvador OT Occupational Therapist              Therapy Charges for Today     Code Description Service Date Service Provider Modifiers Qty    97389616848  OT THER PROC EA 15 MIN 4/22/2023 Zakia Jay OT GO 1    [id number] HC OT SELF CARE/MGMT/TRAIN EA 15 MIN 4/22/2023 Zakia Jay OT GO 2               Zakia Jay OT  4/22/2023

## 2023-04-22 NOTE — PROGRESS NOTES
INFECTIOUS DISEASE Progress Note    Susan Anderson  1940  [id number]    Admission Date: 4/17/2023      Requesting Provider: No ref. provider found  Evaluating Physician: MAY Barrios    Reason for Consultation: Bilateral foot infections    History of present illness:    4/18/23: Patient is a 83 y.o. female With type 2 diabetes mellitus, stage III chronic kidney disease, coronary artery disease, systolic/diastolic congestive heart failure, Severe peripheral vascular disease, recurrent bilateral foot infections, recent bilateral heel decubitus ulcers, a recent MRSA right toe wound infection, and recent Covid 19 in late January 2023 who is seen today after she presented with dyspnea and fatigue, and malaise. I saw her recently in the office with bilateral heel decubitus ulcers and a right fourth toe ulcer with associated cellulitis.  I initially started her on Augmentin therapy but then switched to minocycline after a right fourth toe culture returned positive for MRSA. I followed her during an admission from 1/3 until 1/20/23 for bibasilar pneumonia, Klebsiella/E. Coli UTI, and PEA arrest with acute hypoxic respiratory failure.  She suffered from acute renal failure requiring dialysis but her dialysis has subsequently been discontinued.  She was readmitted from 1/28 to 2/2/23 for Covid 19 infection for which she received Decadron but was not able to receive remdesivir due to bradycardia.  She was readmitted last night with dyspnea and acute hypoxic respiratory failure.  She has required BiPAP and is currently on 30% FiO2 with an O2 saturation of 98%.  She is lethargic and unable to provide any history. Chest CT scan revealed moderate to large bilateral pleural effusions and bibasilar atelectasis with cardiomegaly.  She is undergoing diuresis.She has been started on intravenous daptomycin.  She has remained afebrile.Her pro-BNP yesterday was 19,401. Her creatinine was 1.63.Her white blood cell count was  8.1.Her creatinine today is 1.4 and her white blood cell count is 6.4.     4/19/23:She remains afebrile.She is on 3 L of oxygen with an O2 saturation of 93%. X-ray today reveals right greater than left effusions/opacities.  She is scheduled to undergo right thoracentesis today.  She has decreased dyspnea.  She has some nausea but denies vomiting.    4/20/23:She remains afebrile.  Her creatinine is 1.33. Her O2 saturation is 94% on 3 L. She underwent ultrasound-guided thoracentesis of the right pleural effusion yesterday yielding 1.35 L of pleural fluid. She had increased dyspnea immediately after her thoracentesis but this has dramatically improved.  Her chest x-ray shortly after thoracentesis revealed marked improvement but then she had evidence of bilateral congestion consistent with pulmonary edema. She transiently received Zosyn yesterday due to concerns over possible aspiration pneumonia but this has been discontinued by Dr. Felder.    4/21/23:She remains afebrile.Pleural fluid culture from 4/19 no growth so far. Her MRSA right foot isolate from 4/12 was sensitive to tetracycline and Bactrim.  She is currently on 3 L of oxygen with an O2 saturation of 96-99%.  She has decreased dyspnea.  She denies cough and sputum production.    4/22/23: Afebrile.  Pleural fluid culture negative from 4/19.  She is currently on 3L O2 with % O2 sat.  Her feet feel better.  She has no worsening shortness of breath.  She denies cough, n/v/d, rashes.     Past Medical History:   Diagnosis Date   • Anemia 8/22   • Anxiety    • Arthritis    • Asthma 6/4 - double pneumonia    Currently on inhaler and nebulizer   • Atrial fibrillation 08/21/2022   • Brain tumor     Benign and followed at    • Cancer     cervical cancer, skin cancer   • CHF (congestive heart failure) 06/04/2021   • Chronic kidney disease Related to diabetes   • Clotting disorder 10/22    Upper GI bleed from blood thinners aggravate ulcers   • Coronary artery  disease 6/4/2021 DX for hear failure   • COVID-19 01/28/2023   • Depression 8/22   • Diabetes mellitus 30 years    Seeing Dr. Lam 1st time Aug 19   • Dizziness 07/27/2022   • Effusion of right knee 08/12/2022   • Fall 07/27/2022   • GERD (gastroesophageal reflux disease)    • Gout    • History of medical problems 8/22    AFIB   • History of staph infection 10/2021    right toe   • History of transfusion     no reactions, 1 unit, [facility]   • HL (hearing loss)     Acoustic neuroma right   • Hx of colonoscopy    • Hyperlipidemia Reference current labs x 2-3yrs approx   • Hypertension 30 years   • Hypomagnesemia 10/06/2022   • Insomnia    • Low back pain    • Migraine    • Mitral valve disease    • Mitral valve disease    • Osteomyelitis    • Peptic ulceration 10/22    Secondary to blood thinners   • Peripheral neuropathy    • Physical deconditioning 05/17/2022   • Pneumonia due to infectious organism 06/04/2021   • Pressure injury of skin of sacral region 08/21/2022   • Renal insufficiency 2021   • Sepsis 01/28/2023   • Sleep apnea    • Syncope, unspecified syncope type 10/06/2022   • Type 2 diabetes mellitus     30 years   • Weakness 07/27/2022       Past Surgical History:   Procedure Laterality Date   • ABDOMINAL HYSTERECTOMY W/SALPINGECTOMY     • AMPUTATION  Right great toe, 1st 1/3 metatarsal -Reg 4/14/21   • AORTAGRAM N/A 04/09/2021    Procedure: AORTAGRAM WITH OR WITHOUT RUNOFFS, WITH Co2;  Surgeon: Trey Forrest MD;  Location: [facility];  Service: Vascular;  Laterality: N/A;   • AORTAGRAM N/A 09/28/2022    Procedure: CO2 ANGIOGRAM, LEFT SFA ANGIOPLASTY WITH DRUG ELUTING BALLOON, LEFT SFA STENT PLACEMENT ;  Surgeon: Trey Forrest MD;  Location: [facility];  Service: Vascular;  Laterality: N/A;  FLUORO: 13.12  DOSE 162mGy  CONTRAST: Isovue 350: 15ml   • APPENDECTOMY     • BRAIN TUMOR EXCISION      laser surgery    • CARDIAC CATHETERIZATION N/A 06/21/2021    Procedure: LEFT HEART CATH;  Surgeon:  Anjum Ceballos MD;  Location:  [facility];  Service: Cardiology;  Laterality: N/A;   • CATARACT EXTRACTION W/ INTRAOCULAR LENS  IMPLANT, BILATERAL     • CHOLECYSTECTOMY     • COLONOSCOPY     • ENDOSCOPY N/A 10/07/2022    Procedure: ESOPHAGOGASTRODUODENOSCOPY;  Surgeon: Stef Veras MD;  Location:  [facility];  Service: Gastroenterology;  Laterality: N/A;   • EYE SURGERY     • FEMORAL ARTERY STENT  10/2022   • HYSTERECTOMY     • INTERVENTIONAL RADIOLOGY PROCEDURE N/A 1/15/2023    Procedure: CV INTERVENTIONAL RADIOLOGY PROCEDURE;  Surgeon: Sanket Zapata MD;  Location:  [facility];  Service: Interventional Radiology;  Laterality: N/A;   • TOE SURGERY     • TRANS METATARSAL AMPUTATION Right 04/14/2021    Procedure: AMPUTATION TRANS METATARSAL RIGHT GREAT TOE;  Surgeon: Valdez Finney MD;  Location:  [facility];  Service: Vascular;  Laterality: Right;       Family History   Problem Relation Age of Onset   • Diabetes Mother         Type II   • Heart disease Father    • Heart attack Father    • Coronary artery disease Father    • Diabetes Father         Type II   • Diabetes Sister    • Diabetes Maternal Grandmother    • Diabetes Maternal Grandfather    • Diabetes Paternal Grandmother    • Diabetes Paternal Grandfather        Social History     Socioeconomic History   • Marital status:    • Number of children: 1   Tobacco Use   • Smoking status: Never   • Smokeless tobacco: Never   Vaping Use   • Vaping Use: Never used   Substance and Sexual Activity   • Alcohol use: Not Currently     Comment: Social drinking when not recovering from hospitalization   • Drug use: Never   • Sexual activity: Not Currently     Birth control/protection: None       Allergies   Allergen Reactions   • Vancomycin Other (See Comments)     Acute Kidney Injury, requested by ID   • Baclofen Other (See Comments) and Hallucinations     PSYCHOSIS-POA REFUSES ADMINISTRATION OF THIS MED.   • Cephalexin  Nausea Only   • Erythromycin Base Nausea Only   • Melatonin Other (See Comments)     Nightmares   • Oxycodone Nausea Only   • Protonix [Pantoprazole] Itching and Rash   • Sulfa Antibiotics Nausea Only         Medication:    Current Facility-Administered Medications:   •  apixaban (ELIQUIS) tablet 2.5 mg, 2.5 mg, Oral, Q12H, Breezy Felder MD, 2.5 mg at 04/22/23 0915  •  atorvastatin (LIPITOR) tablet 40 mg, 40 mg, Oral, Nightly, Faiza Angulo MD, 40 mg at 04/21/23 2035  •  sennosides-docusate (PERICOLACE) 8.6-50 MG per tablet 2 tablet, 2 tablet, Oral, BID, 2 tablet at 04/21/23 2035 **AND** polyethylene glycol (MIRALAX) packet 17 g, 17 g, Oral, Daily PRN **AND** bisacodyl (DULCOLAX) EC tablet 5 mg, 5 mg, Oral, Daily PRN **AND** bisacodyl (DULCOLAX) suppository 10 mg, 10 mg, Rectal, Daily PRN, Faiza Angulo MD  •  bumetanide (BUMEX) injection 1 mg, 1 mg, Intravenous, Q12H, Anjum Ceballos MD, 1 mg at 04/22/23 0915  •  clonazePAM (KlonoPIN) tablet 0.5 mg, 0.5 mg, Oral, BID PRN, Faiza Angulo MD, 0.5 mg at 04/17/23 2154  •  clopidogrel (PLAVIX) tablet 75 mg, 75 mg, Oral, Daily, Faiza Angulo MD, 75 mg at 04/22/23 0915  •  DAPTOmycin (CUBICIN) 300 mg in sodium chloride 0.9 % 50 mL IVPB, 4 mg/kg (Adjusted), Intravenous, Q48H, Faiza Angulo MD, Last Rate: 100 mL/hr at 04/21/23 2035, 300 mg at 04/21/23 2035  •  famotidine (PEPCID) tablet 20 mg, 20 mg, Oral, Daily, Breezy Felder MD, 20 mg at 04/22/23 0915  •  insulin detemir (LEVEMIR) injection 5 Units, 5 Units, Subcutaneous, Daily, Breezy Felder MD, 5 Units at 04/22/23 0918  •  Insulin Lispro (humaLOG) injection 0-7 Units, 0-7 Units, Subcutaneous, TID With Meals, Faiza Angulo MD, 5 Units at 04/22/23 1705  •  losartan (COZAAR) tablet 50 mg, 50 mg, Oral, Q24H, Anjum Ceballos MD, 50 mg at 04/22/23 0915  •  metoprolol succinate XL (TOPROL-XL) 24 hr tablet 25 mg, 25 mg, Oral, Q24H, Anjum Ceballos MD, 25 mg at 04/22/23 0915  •  nitroglycerin  (NITROSTAT) ointment 0.5 inch, 0.5 inch, Topical, Q6H PRN, Faiza Angulo MD  •  nystatin (MYCOSTATIN) powder, , Topical, Q12H, Breezy Felder MD, Given at 04/22/23 0918  •  ondansetron (ZOFRAN) injection 4 mg, 4 mg, Intravenous, Q6H PRN, Breezy Felder MD, 4 mg at 04/22/23 0602  •  Pharmacy Consult - Pharmacy to dose, , Does not apply, Continuous PRN, Faiza Angulo MD  •  polyethylene glycol (MIRALAX) packet 17 g, 17 g, Oral, Daily, Faiza Angulo MD, 17 g at 04/18/23 0932  •  prochlorperazine (COMPAZINE) injection 5 mg, 5 mg, Intravenous, Q6H PRN, 5 mg at 04/22/23 0915 **OR** prochlorperazine (COMPAZINE) tablet 5 mg, 5 mg, Oral, Q6H PRN **OR** prochlorperazine (COMPAZINE) suppository 25 mg, 25 mg, Rectal, Q12H PRN, Breezy Felder MD  •  sertraline (ZOLOFT) tablet 50 mg, 50 mg, Oral, Daily, Faiza Angulo MD, 50 mg at 04/22/23 0915  •  sodium chloride 0.9 % flush 10 mL, 10 mL, Intravenous, PRN, Faiza Angulo MD, 10 mL at 04/20/23 2022  •  traMADol (ULTRAM) tablet 25 mg, 25 mg, Oral, Q12H PRN, Faiza Angulo MD, 25 mg at 04/18/23 0937    Antibiotics:  Anti-Infectives (From admission, onward)    Ordered     Dose/Rate Route Frequency Start Stop    04/17/23 2112  DAPTOmycin (CUBICIN) 300 mg in sodium chloride 0.9 % 50 mL IVPB        Ordering Provider: Faiza Angulo MD    4 mg/kg × 69.2 kg (Adjusted)  100 mL/hr over 30 Minutes Intravenous Every 48 Hours 04/19/23 1800 04/25/23 1759    04/19/23 1545  piperacillin-tazobactam (ZOSYN) 3.375 g in iso-osmotic dextrose 50 ml (premix)        Ordering Provider: Breezy Felder MD    3.375 g  over 30 Minutes Intravenous Once 04/19/23 1645 04/19/23 1752    04/17/23 1542  DAPTOmycin (CUBICIN) 300 mg in sodium chloride 0.9 % 50 mL IVPB        Note to Pharmacy: MRSA w/ restriction from Vanco per ID.   Ordering Provider: Kings Herrera MD    300 mg  100 mL/hr over 30 Minutes Intravenous Once 04/17/23 1600 04/17/23 1801            Review of  Systems:  See HPI    Physical Exam:   Vital Signs  Temp (24hrs), Av.8 °F (36.6 °C), Min:96.9 °F (36.1 °C), Max:98.3 °F (36.8 °C)    Temp  Min: 96.9 °F (36.1 °C)  Max: 98.3 °F (36.8 °C)  BP  Min: 114/98  Max: 163/72  Pulse  Min: 45  Max: 77  Resp  Min: 18  Max: 18  SpO2  Min: 96 %  Max: 100 %    GENERAL: She remains much more alert and responsive.  HEENT: No labial ulcers    NECK: Supple   LYMPH: No cervical, axillary or inguinal lymphadenopathy.  HEART: RRR; No murmur, rubs, gallops.   LUNGS: .Decreased breath sounds at the right base with scattered rales.  ABDOMEN: Soft, nontender, nondistended.   EXT:  1-2+ bilateral lower extremity edema.  She has bilateral 1.5 cm heel pressure ulcers.  The right-sided ulcer has decreased surrounding erythema on .  These are non-stageable but may be stage III.  She has a right lateral fourth toe ulcer which is 0..75 x .4 cm  with some decreased erythema and central slough.  This appears to be 3 mm deep.  Wounds were not evaluated today as did not take down dressings.   MSK: No joint effusions or erythema  SKIN: No diffuse rash present.    NEURO: She is more alert.    Laboratory Data    Results from last 7 days   Lab Units 23  0428 23  0349 23  1517   WBC 10*3/mm3 7.25 6.37 8.08   HEMOGLOBIN g/dL 10.6* 10.9* 11.7*   HEMATOCRIT % 34.9 36.3 39.3   PLATELETS 10*3/mm3 200 199 236     Results from last 7 days   Lab Units 23  0313   SODIUM mmol/L 150*   POTASSIUM mmol/L 3.9   CHLORIDE mmol/L 105   CO2 mmol/L 34.0*   BUN mg/dL 67*   CREATININE mg/dL 1.36*   GLUCOSE mg/dL 134*   CALCIUM mg/dL 8.6     Results from last 7 days   Lab Units 23  0451   ALK PHOS U/L 139*   BILIRUBIN mg/dL 0.8   ALT (SGPT) U/L 11   AST (SGOT) U/L 21         Results from last 7 days   Lab Units 23  1517   CRP mg/dL 8.02*     Results from last 7 days   Lab Units 23  1517   LACTATE mmol/L 1.1     Results from last 7 days   Lab Units 23  1517   CK TOTAL U/L 106          Estimated Creatinine Clearance: 33.8 mL/min (A) (by C-G formula based on SCr of 1.36 mg/dL (H)).      Microbiology:  Microbiology Results (last 10 days)     Procedure Component Value - Date/Time    Body Fluid Culture - Body Fluid, Pleural Cavity [[id number]] Collected: 04/19/23 1220    Lab Status: Final result Specimen: Body Fluid from Pleural Cavity Updated: 04/22/23 0819     Body Fluid Culture No growth at 3 days     Gram Stain Moderate (3+) WBCs seen      No organisms seen    Anaerobic Culture - Pleural Fluid, Pleural Cavity [[id number]]  (Normal) Collected: 04/19/23 1220    Lab Status: Preliminary result Specimen: Pleural Fluid from Pleural Cavity Updated: 04/22/23 0839     Anaerobic Culture No anaerobes isolated at 5 days    Blood Culture - Blood, Arm, Left [[id number]]  (Normal) Collected: 04/17/23 1600    Lab Status: Final result Specimen: Blood from Arm, Left Updated: 04/22/23 1700     Blood Culture No growth at 5 days    Blood Culture - Blood, Arm, Right [[id number]]  (Normal) Collected: 04/17/23 1553    Lab Status: Final result Specimen: Blood from Arm, Right Updated: 04/22/23 1700     Blood Culture No growth at 5 days        Culture results from last 30 days   Lab 04/19/23  1220 04/12/23  0854   WOUNDCX  --  Moderate growth (3+) Staphylococcus aureus, MRSA*   ANACX No anaerobes isolated at 5 days  --            Radiology:  Imaging Results (Last 72 Hours)     Procedure Component Value Units Date/Time    XR Chest 1 View [[id number]] Collected: 04/20/23 0835     Updated: 04/20/23 0900    Narrative:      XR CHEST 1 VW    Date of Exam: 4/20/2023 8:12 AM EDT    Indication: pulmonary edema.    Comparison: April 19, 2023    Findings:    There are small right and moderate left pleural effusions with bibasilar opacities. The heart and mediastinal contours appear normal. The pulmonary vasculature appears normal. There are degenerative changes along the left shoulder.      Impression:      Impression:  1. Small  right and moderate left pleural effusions.  2. Bibasal opacities, which could reflect atelectasis or pneumonia.    Electronically Signed: Jah Champion    4/20/2023 8:57 AM EDT    Workstation ID: [id number]      I read her radiographic studies.      Impression:   1.  MRSA right foot wound infection-with cellulitis of the right fourth toe and bilateral decubitus heel ulcers.  This has occurred in the setting of severe peripheral vascular disease and severe debility.  These will be very difficult to heal.  She is currently on daptomycin therapy.  Her cellulitis has improved.  I will plan to switch her to oral minocycline soon.  2.  Pulmonary edema-She had flash pulmonary edema after her thoracentesis with removal of over 1.3 L of right pleural fluid.  She continues to improved.   3.  Acute hypoxic respiratory failure-secondary to pulmonary edema  4.  Acute on chronic systolic/diastolic heart failure  5.  Paroxysmal atrial fibrillation  6.  History of paroxysmal atrial fibrillation  7.  Severe peripheral vascular disease  8.  Severe debility  9.  Type 2 diabetes mellitus-this places her at increased risk for recurrent infections  10.  Stage III chronic kidney disease  11.  Recent Covid 19 infection-1/28/23.  This has contributed to her debility    PLAN/RECOMMENDATIONS:   1.  Continue daptomycin  2.  Continue foot wound care  3.  Possible discharge soon    She has significantly improved over the last 3 days.  She may be able to discharge soon.  I will plan for her to discharge on oral minocycline when appropriate.    Copied text in this note has been reviewed and is accurate as of 04/22/23    PA saw and examined patient today.  D/w MAY Warren-TRINA  [facility]         MAY Barrios  4/22/2023  17:54 EDT

## 2023-04-22 NOTE — PLAN OF CARE
Goal Outcome Evaluation:         Pt. Complained on nausea, given Zofran. VS stable throughout night. Pt. Currently resting.

## 2023-04-23 ENCOUNTER — DOCUMENTATION (OUTPATIENT)
Dept: PHYSICAL THERAPY | Facility: HOSPITAL | Age: 83
End: 2023-04-23
Payer: MEDICARE

## 2023-04-23 DIAGNOSIS — R60.0 BILATERAL LOWER EXTREMITY EDEMA: ICD-10-CM

## 2023-04-23 DIAGNOSIS — S91.301D OPEN WOUND OF RIGHT HEEL, SUBSEQUENT ENCOUNTER: ICD-10-CM

## 2023-04-23 DIAGNOSIS — S91.302D OPEN WOUND OF LEFT HEEL, SUBSEQUENT ENCOUNTER: Primary | ICD-10-CM

## 2023-04-23 LAB
GLUCOSE BLDC GLUCOMTR-MCNC: 214 MG/DL (ref 70–130)
GLUCOSE BLDC GLUCOMTR-MCNC: 333 MG/DL (ref 70–130)
GLUCOSE BLDC GLUCOMTR-MCNC: 334 MG/DL (ref 70–130)

## 2023-04-23 PROCEDURE — 63710000001 INSULIN DETEMIR PER 5 UNITS: Performed by: HOSPITALIST

## 2023-04-23 PROCEDURE — 25010000002 DAPTOMYCIN PER 1 MG: Performed by: PHYSICIAN ASSISTANT

## 2023-04-23 PROCEDURE — 99231 SBSQ HOSP IP/OBS SF/LOW 25: CPT | Performed by: PHYSICIAN ASSISTANT

## 2023-04-23 PROCEDURE — 82962 GLUCOSE BLOOD TEST: CPT

## 2023-04-23 PROCEDURE — 63710000001 INSULIN LISPRO (HUMAN) PER 5 UNITS: Performed by: INTERNAL MEDICINE

## 2023-04-23 PROCEDURE — 99232 SBSQ HOSP IP/OBS MODERATE 35: CPT | Performed by: HOSPITALIST

## 2023-04-23 RX ADMIN — INSULIN LISPRO 5 UNITS: 100 INJECTION, SOLUTION INTRAVENOUS; SUBCUTANEOUS at 12:11

## 2023-04-23 RX ADMIN — BUMETANIDE 1 MG: 0.25 INJECTION, SOLUTION INTRAMUSCULAR; INTRAVENOUS at 08:48

## 2023-04-23 RX ADMIN — APIXABAN 2.5 MG: 2.5 TABLET, FILM COATED ORAL at 08:47

## 2023-04-23 RX ADMIN — ATORVASTATIN CALCIUM 40 MG: 40 TABLET, FILM COATED ORAL at 21:45

## 2023-04-23 RX ADMIN — CLONAZEPAM 0.5 MG: 0.5 TABLET ORAL at 11:09

## 2023-04-23 RX ADMIN — NYSTATIN 500000 UNITS: 100000 SUSPENSION ORAL at 17:12

## 2023-04-23 RX ADMIN — INSULIN LISPRO 5 UNITS: 100 INJECTION, SOLUTION INTRAVENOUS; SUBCUTANEOUS at 08:45

## 2023-04-23 RX ADMIN — METOPROLOL SUCCINATE 25 MG: 25 TABLET, EXTENDED RELEASE ORAL at 08:47

## 2023-04-23 RX ADMIN — INSULIN LISPRO 3 UNITS: 100 INJECTION, SOLUTION INTRAVENOUS; SUBCUTANEOUS at 17:12

## 2023-04-23 RX ADMIN — INSULIN DETEMIR 12 UNITS: 100 INJECTION, SOLUTION SUBCUTANEOUS at 08:46

## 2023-04-23 RX ADMIN — NYSTATIN: 100000 POWDER TOPICAL at 08:48

## 2023-04-23 RX ADMIN — SERTRALINE HYDROCHLORIDE 50 MG: 50 TABLET ORAL at 08:46

## 2023-04-23 RX ADMIN — NYSTATIN 500000 UNITS: 100000 SUSPENSION ORAL at 12:11

## 2023-04-23 RX ADMIN — Medication 10 ML: at 08:47

## 2023-04-23 RX ADMIN — NYSTATIN 500000 UNITS: 100000 SUSPENSION ORAL at 21:44

## 2023-04-23 RX ADMIN — LOSARTAN POTASSIUM 50 MG: 50 TABLET, FILM COATED ORAL at 08:47

## 2023-04-23 RX ADMIN — NYSTATIN: 100000 POWDER TOPICAL at 21:45

## 2023-04-23 RX ADMIN — APIXABAN 2.5 MG: 2.5 TABLET, FILM COATED ORAL at 21:45

## 2023-04-23 RX ADMIN — DAPTOMYCIN 300 MG: 500 INJECTION, POWDER, LYOPHILIZED, FOR SOLUTION INTRAVENOUS at 17:12

## 2023-04-23 RX ADMIN — FAMOTIDINE 20 MG: 20 TABLET, FILM COATED ORAL at 08:46

## 2023-04-23 RX ADMIN — BUMETANIDE 1 MG: 0.25 INJECTION, SOLUTION INTRAMUSCULAR; INTRAVENOUS at 21:44

## 2023-04-23 RX ADMIN — CLOPIDOGREL BISULFATE 75 MG: 75 TABLET ORAL at 08:46

## 2023-04-23 NOTE — PLAN OF CARE
Goal Outcome Evaluation:  Plan of Care Reviewed With: patient        Progress: no change  Outcome Evaluation: Patient resting in bed with no acute events overnight. No complaints of pain, no s/s of distress. VSS.

## 2023-04-23 NOTE — PROGRESS NOTES
[facility] Medicine Services  PROGRESS NOTE    Patient Name: Susan Anderson  : 1940  MRN: [id number]    Date of Admission: 2023  Primary Care Physician: Arleen Doherty MD    Subjective   Subjective     CC: AMS    HPI: Up in bed. Dyspnea remains better. Nausea/dysgeusia noted. No f/c. No cough/congestion. Denies dysuria. No abdominal pain.    Review of Systems   Constitutional: Positive for activity change and fatigue.   HENT: Negative.  Negative for trouble swallowing.    Respiratory: Negative for chest tightness and shortness of breath.    Cardiovascular: Negative for leg swelling.   Gastrointestinal: Positive for nausea.   Genitourinary: Negative.          Vital Signs:   Temp:  [97.5 °F (36.4 °C)-98.1 °F (36.7 °C)] 98.1 °F (36.7 °C)  Heart Rate:  [64-77] 77  Resp:  [16-18] 18  BP: (137-158)/(69-85) 145/83  Flow (L/min):  [3] 3     Physical Exam:  NAD, alert and oriented  OP clear, dry MM  No obvious thrush  Neck supple  No LAD  RRR  Improved BS B, still remains decreased at bases, worse on L  +BS, soft  GARG  Normal affect  No change from     Results Reviewed:  LAB RESULTS:      Lab 23  0451 23  0840 23  0428 23  0349 23  1517   WBC  --   --  7.25 6.37 8.08   HEMOGLOBIN  --   --  10.6* 10.9* 11.7*   HEMATOCRIT  --   --  34.9 36.3 39.3   PLATELETS  --   --  200 199 236   NEUTROS ABS  --   --   --  4.16 6.11   IMMATURE GRANS (ABS)  --   --   --  0.67* 0.51*   LYMPHS ABS  --   --   --  0.86 0.82   MONOS ABS  --   --   --  0.59 0.58   EOS ABS  --   --   --  0.07 0.04   MCV  --   --  99.7* 99.7* 101.6*   CRP  --   --   --   --  8.02*   PROCALCITONIN  --   --   --   --  0.13   LACTATE  --   --   --   --  1.1   *  --   --   --  527*   PROTIME  --  18.9*  --   --   --    D DIMER QUANT  --   --   --   --  2.10*         Lab 23  0313 23  0451 23  0428 23  0543 23  1841 23  1517   SODIUM 150* 148* 149*  148*  --  142   POTASSIUM 3.9 3.8 4.3 4.9  --  5.7*   CHLORIDE 105 106 104 103  --  102   CO2 34.0* 35.0* 30.0* 26.0  --  31.0*   ANION GAP 11.0 7.0 15.0 19.0*  --  9.0   BUN 67* 67* 71* 76*  --  80*   CREATININE 1.36* 1.33* 1.39* 1.40*  --  1.63*   EGFR 38.5* 39.5* 37.5* 37.2*  --  31.0*   GLUCOSE 134* 167* 216* 160*  --  289*   CALCIUM 8.6 7.8* 8.3* 8.6  --  8.4*   MAGNESIUM  --   --   --   --   --  2.1   TSH  --   --   --   --  3.640  --          Lab 04/20/23  0451 04/19/23  0428 04/17/23  1517   TOTAL PROTEIN 4.4* 4.9* 5.9*   ALBUMIN 2.3* 2.3* 2.8*   GLOBULIN 2.1 2.6 3.1   ALT (SGPT) 11 13 22   AST (SGOT) 21 19 40*   BILIRUBIN 0.8 0.8 0.6   ALK PHOS 139* 164* 208*         Lab 04/19/23  0840 04/17/23  2027 04/17/23  1841 04/17/23  1517   PROBNP  --   --   --  19,401.0*  18,084.0*   HSTROP T  --  310* 331* 326*   PROTIME 18.9*  --   --   --    INR 1.58*  --   --   --              Lab 04/17/23  1517   FERRITIN 63.04         Lab 04/18/23  0651 04/18/23  0329 04/18/23  0024   FIO2 30 30 21   CARBOXYHEMOGLOBIN (VENOUS) 1.9 1.9 1.7     Brief Urine Lab Results  (Last result in the past 365 days)      Color   Clarity   Blood   Leuk Est   Nitrite   Protein   CREAT   Urine HCG        04/22/23 1044 Yellow   Cloudy   Small (1+)   Small (1+)   Negative   >=300 mg/dL (3+)                 Microbiology Results Abnormal     Procedure Component Value - Date/Time    Blood Culture - Blood, Arm, Right [[id number]]  (Normal) Collected: 04/17/23 1553    Lab Status: Final result Specimen: Blood from Arm, Right Updated: 04/22/23 1700     Blood Culture No growth at 5 days    Blood Culture - Blood, Arm, Left [[id number]]  (Normal) Collected: 04/17/23 1600    Lab Status: Final result Specimen: Blood from Arm, Left Updated: 04/22/23 1700     Blood Culture No growth at 5 days    Anaerobic Culture - Pleural Fluid, Pleural Cavity [[id number]]  (Normal) Collected: 04/19/23 1220    Lab Status: Preliminary result Specimen: Pleural Fluid from Pleural  Cavity Updated: 04/22/23 0839     Anaerobic Culture No anaerobes isolated at 5 days    Body Fluid Culture - Body Fluid, Pleural Cavity [[id number]] Collected: 04/19/23 1220    Lab Status: Final result Specimen: Body Fluid from Pleural Cavity Updated: 04/22/23 0819     Body Fluid Culture No growth at 3 days     Gram Stain Moderate (3+) WBCs seen      No organisms seen          No radiology results from the last 24 hrs    Results for orders placed during the hospital encounter of 04/17/23    Adult Transthoracic Echo Complete W/ Cont if Necessary Per Protocol    Interpretation Summary  •  Left ventricular systolic function is moderately decreased. Left ventricular ejection fraction appears to be 36 - 40%.  •  Left ventricular diastolic function is consistent with (grade I) impaired relaxation.  •  Trace to mild mitral vegetation.  •  A left sided pleural effusion is noted.      Current medications:  Scheduled Meds:apixaban, 2.5 mg, Oral, Q12H  atorvastatin, 40 mg, Oral, Nightly  bumetanide, 1 mg, Intravenous, Q12H  clopidogrel, 75 mg, Oral, Daily  DAPTOmycin, 4 mg/kg (Adjusted), Intravenous, Q48H  famotidine, 20 mg, Oral, Daily  insulin detemir, 5 Units, Subcutaneous, Daily  insulin lispro, 0-7 Units, Subcutaneous, TID With Meals  losartan, 50 mg, Oral, Q24H  metoprolol succinate XL, 25 mg, Oral, Q24H  nystatin, 5 mL, Swish & Swallow, 4x Daily  nystatin, , Topical, Q12H  polyethylene glycol, 17 g, Oral, Daily  senna-docusate sodium, 2 tablet, Oral, BID  sertraline, 50 mg, Oral, Daily      Continuous Infusions:Pharmacy Consult - Pharmacy to dose,       PRN Meds:.•  senna-docusate sodium **AND** polyethylene glycol **AND** bisacodyl **AND** bisacodyl  •  clonazePAM  •  nitroglycerin  •  ondansetron  •  Pharmacy Consult - Pharmacy to dose  •  prochlorperazine **OR** prochlorperazine **OR** prochlorperazine  •  sodium chloride  •  traMADol    Assessment & Plan   Assessment & Plan     Active Hospital Problems    Diagnosis   POA   • **Cellulitis of right foot due to methicillin-resistant Staphylococcus aureus [L03.115, B95.62]  Yes   • Acute respiratory failure with hypoxia and hypercarbia [J96.01, J96.02]  Unknown   • T2DM  [E11.9]  Yes   • Paroxysmal atrial fibrillation [I48.0]  Yes   • Chronic combined systolic and diastolic heart failure  [I50.42]  Yes   • NICM (nonischemic cardiomyopathy) ([facility]) [I42.8]  Yes   • Dyslipidemia [E78.5]  Yes   • Mitral valve disease [I05.9]  Yes   • Acute on chronic systolic CHF (congestive heart failure) [I50.23]  Yes   • Diabetic foot ulcer [E11.621, L97.509]  Yes   • PVD (peripheral vascular disease) ([facility]) [I73.9]  Yes   • Diabetes mellitus with neuropathy [E11.40]  Yes   • Stage 3a chronic kidney disease [N18.31]  Yes   • Essential hypertension [I10]  Yes      Resolved Hospital Problems   No resolved problems to display.        Brief Hospital Course to date:  Susan Anderson is a 82 y.o. chronically ill female w/ HTN, HLD, CKD3, CAD, PAD, HFrEF (41-45%), PUD/GIB, pAfib, DM2, with 6 admissions since August 2022 (predominantly for CHF). She was admitted 1/3-1/20 after PEA arrest requiring CPR after receiving propofol for EGD; and then COVID and acute renal failure requiring initiation of dialysis. Since then she has been battling bilateral heel wounds and been following with [facility] and Dr. Finney and outpatient PT. She has had peripheral stent placed as well.     Metabolic encephalopathy  -NIPPV as needed  -monitor progress, better     A/C HFrEF- ED 40%  Bilateral pleural effusions  -on IV bumex, ARB, BB  -can change to PO bumex at DC per cardiology  -better overall    B effusions R>L  -s/p R thoracentesis with over 1L removed, this admission  -better overall, continue diuretics per cardiology  -no plans for L effusion at this time, she has improved significantly  -symptoms better overall    Acute nausea  -zofrna/compazine prn  -no constipation  -no obvious thrush appreciated, but add nystatin  swish/swallow, notes dysgeusia  -UA unremarkable  -abdominal exam unremarkable    Hx of PAF  -Eliquis    Hx of PUD/GIB  -PPI allergy listed  -pepcid     Chronic bilateral heel wounds  -followed by Dr. Finney/[facility]  -on daptomycin, can change to minocycline at DC per ID  -wound care  -outpatient follow up with vascular surgery     Deconditioned  -PT/OT     T2DM  -SSI, titrate basal, trending up     DVT prophylaxis: Eliquis    Expected Discharge Location and Transportation: Rehab  Expected Discharge   Expected Discharge Date and Time     Expected Discharge Date Expected Discharge Time    Apr 24, 2023            DVT prophylaxis:  Medical DVT prophylaxis orders are present.     AM-PAC 6 Clicks Score (PT): 12 (04/22/23 0800)    CODE STATUS:   Code Status and Medical Interventions:   Ordered at: 04/17/23 1800     Code Status (Patient has no pulse and is not breathing):    No CPR (Do Not Attempt to Resuscitate)     Medical Interventions (Patient has pulse or is breathing):    Full       Breezy Felder MD  04/23/23

## 2023-04-23 NOTE — PROGRESS NOTES
CTS Progress Note      Chief Complaint: Nonhealing heel ulcers bilateral      Subjective  In bed.  Conversant.  Pleasant.  No new complaint  States she has no appetite    Objective    Physical Exam:   Vital Signs   Temp:  [97.5 °F (36.4 °C)-98.1 °F (36.7 °C)] 97.7 °F (36.5 °C)  Heart Rate:  [64-77] 65  Resp:  [16-18] 16  BP: (137-158)/(69-85) 143/70   GEN: NAD   CV: Regular rate and rhythm    RESP: Decreased to bases but clear to auscultation bilaterally   ABD: Soft nontender positive bowel    EXT: Warm to the touch no significant edema.  Feet are warm but no palpable pulses   Neuro: Alert and oriented          Results     Results from last 7 days   Lab Units 04/19/23  0428   WBC 10*3/mm3 7.25   HEMOGLOBIN g/dL 10.6*   HEMATOCRIT % 34.9   PLATELETS 10*3/mm3 200     Results from last 7 days   Lab Units 04/22/23  0313   SODIUM mmol/L 150*   POTASSIUM mmol/L 3.9   CHLORIDE mmol/L 105   CO2 mmol/L 34.0*   BUN mg/dL 67*   CREATININE mg/dL 1.36*   GLUCOSE mg/dL 134*   CALCIUM mg/dL 8.6     Results from last 7 days   Lab Units 04/19/23  0840   INR  1.58*         Assessment & Plan     Peripheral arterial disease with a history of left SFA angioplasty and stent on 9/28/2022.    nonhealing bilateral heel ulcers      Plan   Continue wound care and IV/oral antibiotic therapy per infectious disease and wound care team  Currently no plans from a vascular standpoint for surgical intervention  Follow-up with vascular surgery 1 to 2 weeks following discharge    MAY Martino  04/23/23  06:58 EDT

## 2023-04-23 NOTE — PROGRESS NOTES
INFECTIOUS DISEASE Progress Note    Susan Anderson  1940  [id number]    Admission Date: 4/17/2023      Requesting Provider: No ref. provider found  Evaluating Physician: MAY Barrios    Reason for Consultation: Bilateral foot infections    History of present illness:    4/18/23: Patient is a 83 y.o. female With type 2 diabetes mellitus, stage III chronic kidney disease, coronary artery disease, systolic/diastolic congestive heart failure, Severe peripheral vascular disease, recurrent bilateral foot infections, recent bilateral heel decubitus ulcers, a recent MRSA right toe wound infection, and recent Covid 19 in late January 2023 who is seen today after she presented with dyspnea and fatigue, and malaise. I saw her recently in the office with bilateral heel decubitus ulcers and a right fourth toe ulcer with associated cellulitis.  I initially started her on Augmentin therapy but then switched to minocycline after a right fourth toe culture returned positive for MRSA. I followed her during an admission from 1/3 until 1/20/23 for bibasilar pneumonia, Klebsiella/E. Coli UTI, and PEA arrest with acute hypoxic respiratory failure.  She suffered from acute renal failure requiring dialysis but her dialysis has subsequently been discontinued.  She was readmitted from 1/28 to 2/2/23 for Covid 19 infection for which she received Decadron but was not able to receive remdesivir due to bradycardia.  She was readmitted last night with dyspnea and acute hypoxic respiratory failure.  She has required BiPAP and is currently on 30% FiO2 with an O2 saturation of 98%.  She is lethargic and unable to provide any history. Chest CT scan revealed moderate to large bilateral pleural effusions and bibasilar atelectasis with cardiomegaly.  She is undergoing diuresis.She has been started on intravenous daptomycin.  She has remained afebrile.Her pro-BNP yesterday was 19,401. Her creatinine was 1.63.Her white blood cell count was  8.1.Her creatinine today is 1.4 and her white blood cell count is 6.4.     4/19/23:She remains afebrile.She is on 3 L of oxygen with an O2 saturation of 93%. X-ray today reveals right greater than left effusions/opacities.  She is scheduled to undergo right thoracentesis today.  She has decreased dyspnea.  She has some nausea but denies vomiting.    4/20/23:She remains afebrile.  Her creatinine is 1.33. Her O2 saturation is 94% on 3 L. She underwent ultrasound-guided thoracentesis of the right pleural effusion yesterday yielding 1.35 L of pleural fluid. She had increased dyspnea immediately after her thoracentesis but this has dramatically improved.  Her chest x-ray shortly after thoracentesis revealed marked improvement but then she had evidence of bilateral congestion consistent with pulmonary edema. She transiently received Zosyn yesterday due to concerns over possible aspiration pneumonia but this has been discontinued by Dr. Felder.    4/21/23:She remains afebrile.Pleural fluid culture from 4/19 no growth so far. Her MRSA right foot isolate from 4/12 was sensitive to tetracycline and Bactrim.  She is currently on 3 L of oxygen with an O2 saturation of 96-99%.  She has decreased dyspnea.  She denies cough and sputum production.    4/22/23: Afebrile.  Pleural fluid culture negative from 4/19.  She is currently on 3L O2 with % O2 sat.  Her feet feel better.  She has no worsening shortness of breath.  She denies cough, n/v/d, rashes.     4/23/23: Afebrile.  Reviewed pictures from 4/22.  She remained on 3L O2 with % O2 sat.  Urine cultures from 4/22 with 25K GNR and UA WBC 6-12 with small LE/negative nitrite.  The culture was ordered d/t worsening nausea.  Per family in room, she has been confused today.       Past Medical History:   Diagnosis Date   • Anemia 8/22   • Anxiety    • Arthritis    • Asthma 6/4 - double pneumonia    Currently on inhaler and nebulizer   • Atrial fibrillation 08/21/2022   •  Brain tumor     Benign and followed at    • Cancer     cervical cancer, skin cancer   • CHF (congestive heart failure) 06/04/2021   • Chronic kidney disease Related to diabetes   • Clotting disorder 10/22    Upper GI bleed from blood thinners aggravate ulcers   • Coronary artery disease 6/4/2021 DX for hear failure   • COVID-19 01/28/2023   • Depression 8/22   • Diabetes mellitus 30 years    Seeing Dr. Lam 1st time Aug 19   • Dizziness 07/27/2022   • Effusion of right knee 08/12/2022   • Fall 07/27/2022   • GERD (gastroesophageal reflux disease)    • Gout    • History of medical problems 8/22    AFIB   • History of staph infection 10/2021    right toe   • History of transfusion     no reactions, 1 unit, [facility]   • HL (hearing loss)     Acoustic neuroma right   • Hx of colonoscopy    • Hyperlipidemia Reference current labs x 2-3yrs approx   • Hypertension 30 years   • Hypomagnesemia 10/06/2022   • Insomnia    • Low back pain    • Migraine    • Mitral valve disease    • Mitral valve disease    • Osteomyelitis    • Peptic ulceration 10/22    Secondary to blood thinners   • Peripheral neuropathy    • Physical deconditioning 05/17/2022   • Pneumonia due to infectious organism 06/04/2021   • Pressure injury of skin of sacral region 08/21/2022   • Renal insufficiency 2021   • Sepsis 01/28/2023   • Sleep apnea    • Syncope, unspecified syncope type 10/06/2022   • Type 2 diabetes mellitus     30 years   • Weakness 07/27/2022       Past Surgical History:   Procedure Laterality Date   • ABDOMINAL HYSTERECTOMY W/SALPINGECTOMY     • AMPUTATION  Right great toe, 1st 1/3 metatarsal -Reg 4/14/21   • AORTAGRAM N/A 04/09/2021    Procedure: AORTAGRAM WITH OR WITHOUT RUNOFFS, WITH Co2;  Surgeon: Trey Forrest MD;  Location: [facility];  Service: Vascular;  Laterality: N/A;   • AORTAGRAM N/A 09/28/2022    Procedure: CO2 ANGIOGRAM, LEFT SFA ANGIOPLASTY WITH DRUG ELUTING BALLOON, LEFT SFA STENT PLACEMENT ;  Surgeon: Adriane  Trey VIGIL MD;  Location:  [facility];  Service: Vascular;  Laterality: N/A;  FLUORO: 13.12  DOSE 162mGy  CONTRAST: Isovue 350: 15ml   • APPENDECTOMY     • BRAIN TUMOR EXCISION      laser surgery    • CARDIAC CATHETERIZATION N/A 06/21/2021    Procedure: LEFT HEART CATH;  Surgeon: Anjum Ceballos MD;  Location:  [facility];  Service: Cardiology;  Laterality: N/A;   • CATARACT EXTRACTION W/ INTRAOCULAR LENS  IMPLANT, BILATERAL     • CHOLECYSTECTOMY     • COLONOSCOPY     • ENDOSCOPY N/A 10/07/2022    Procedure: ESOPHAGOGASTRODUODENOSCOPY;  Surgeon: Stef Veras MD;  Location:  [facility];  Service: Gastroenterology;  Laterality: N/A;   • EYE SURGERY     • FEMORAL ARTERY STENT  10/2022   • HYSTERECTOMY     • INTERVENTIONAL RADIOLOGY PROCEDURE N/A 1/15/2023    Procedure: CV INTERVENTIONAL RADIOLOGY PROCEDURE;  Surgeon: Sanket Zapata MD;  Location:  [facility];  Service: Interventional Radiology;  Laterality: N/A;   • TOE SURGERY     • TRANS METATARSAL AMPUTATION Right 04/14/2021    Procedure: AMPUTATION TRANS METATARSAL RIGHT GREAT TOE;  Surgeon: Valedz Finney MD;  Location:  [facility];  Service: Vascular;  Laterality: Right;       Family History   Problem Relation Age of Onset   • Diabetes Mother         Type II   • Heart disease Father    • Heart attack Father    • Coronary artery disease Father    • Diabetes Father         Type II   • Diabetes Sister    • Diabetes Maternal Grandmother    • Diabetes Maternal Grandfather    • Diabetes Paternal Grandmother    • Diabetes Paternal Grandfather        Social History     Socioeconomic History   • Marital status:    • Number of children: 1   Tobacco Use   • Smoking status: Never   • Smokeless tobacco: Never   Vaping Use   • Vaping Use: Never used   Substance and Sexual Activity   • Alcohol use: Not Currently     Comment: Social drinking when not recovering from hospitalization   • Drug use: Never   • Sexual activity: Not  Currently     Birth control/protection: None       Allergies   Allergen Reactions   • Vancomycin Other (See Comments)     Acute Kidney Injury, requested by ID   • Baclofen Other (See Comments) and Hallucinations     PSYCHOSIS-POA REFUSES ADMINISTRATION OF THIS MED.   • Cephalexin Nausea Only   • Erythromycin Base Nausea Only   • Melatonin Other (See Comments)     Nightmares   • Oxycodone Nausea Only   • Protonix [Pantoprazole] Itching and Rash   • Sulfa Antibiotics Nausea Only         Medication:    Current Facility-Administered Medications:   •  apixaban (ELIQUIS) tablet 2.5 mg, 2.5 mg, Oral, Q12H, Breezy Felder MD, 2.5 mg at 04/23/23 0847  •  atorvastatin (LIPITOR) tablet 40 mg, 40 mg, Oral, Nightly, Faiza Angulo MD, 40 mg at 04/22/23 2135  •  sennosides-docusate (PERICOLACE) 8.6-50 MG per tablet 2 tablet, 2 tablet, Oral, BID, 2 tablet at 04/21/23 2035 **AND** polyethylene glycol (MIRALAX) packet 17 g, 17 g, Oral, Daily PRN **AND** bisacodyl (DULCOLAX) EC tablet 5 mg, 5 mg, Oral, Daily PRN **AND** bisacodyl (DULCOLAX) suppository 10 mg, 10 mg, Rectal, Daily PRN, Faiza Angulo MD  •  bumetanide (BUMEX) injection 1 mg, 1 mg, Intravenous, Q12H, Anjum Ceballos MD, 1 mg at 04/23/23 0848  •  clonazePAM (KlonoPIN) tablet 0.5 mg, 0.5 mg, Oral, BID PRN, Faiza Angulo MD, 0.5 mg at 04/23/23 1109  •  clopidogrel (PLAVIX) tablet 75 mg, 75 mg, Oral, Daily, Faiza Angulo MD, 75 mg at 04/23/23 0846  •  DAPTOmycin (CUBICIN) 300 mg in sodium chloride 0.9 % 50 mL IVPB, 4 mg/kg (Adjusted), Intravenous, Q48H, Faiza Angulo MD, Last Rate: 100 mL/hr at 04/21/23 2035, 300 mg at 04/21/23 2035  •  famotidine (PEPCID) tablet 20 mg, 20 mg, Oral, Daily, Breezy Felder MD, 20 mg at 04/23/23 0846  •  insulin detemir (LEVEMIR) injection 12 Units, 12 Units, Subcutaneous, Daily, Breezy Felder MD, 12 Units at 04/23/23 0846  •  Insulin Lispro (humaLOG) injection 0-7 Units, 0-7 Units, Subcutaneous, TID With Meals, Woody,  Faiza BORREGO MD, 5 Units at 04/23/23 1211  •  losartan (COZAAR) tablet 50 mg, 50 mg, Oral, Q24H, Anjum Ceballos MD, 50 mg at 04/23/23 0847  •  metoprolol succinate XL (TOPROL-XL) 24 hr tablet 25 mg, 25 mg, Oral, Q24H, Anjum Ceballos MD, 25 mg at 04/23/23 0847  •  nitroglycerin (NITROSTAT) ointment 0.5 inch, 0.5 inch, Topical, Q6H PRN, Faiza Angulo MD  •  nystatin (MYCOSTATIN) 100,000 unit/mL suspension 500,000 Units, 5 mL, Swish & Swallow, 4x Daily, Breezy Felder MD, 500,000 Units at 04/23/23 1211  •  nystatin (MYCOSTATIN) powder, , Topical, Q12H, Breezy Felder MD, Given at 04/23/23 0848  •  ondansetron (ZOFRAN) injection 4 mg, 4 mg, Intravenous, Q6H PRN, Breezy Felder MD, 4 mg at 04/22/23 0602  •  Pharmacy Consult - Pharmacy to dose, , Does not apply, Continuous PRN, Faiza Angulo MD  •  polyethylene glycol (MIRALAX) packet 17 g, 17 g, Oral, Daily, Faiza Angulo MD, 17 g at 04/18/23 0932  •  prochlorperazine (COMPAZINE) injection 5 mg, 5 mg, Intravenous, Q6H PRN, 5 mg at 04/22/23 0915 **OR** prochlorperazine (COMPAZINE) tablet 5 mg, 5 mg, Oral, Q6H PRN **OR** prochlorperazine (COMPAZINE) suppository 25 mg, 25 mg, Rectal, Q12H PRN, Breezy Felder MD  •  sertraline (ZOLOFT) tablet 50 mg, 50 mg, Oral, Daily, Faiza Angulo MD, 50 mg at 04/23/23 0846  •  sodium chloride 0.9 % flush 10 mL, 10 mL, Intravenous, PRN, Faiza Angulo MD, 10 mL at 04/23/23 0847  •  traMADol (ULTRAM) tablet 25 mg, 25 mg, Oral, Q12H PRN, Faiza Angulo MD, 25 mg at 04/18/23 0937    Antibiotics:  Anti-Infectives (From admission, onward)    Ordered     Dose/Rate Route Frequency Start Stop    04/17/23 2112  DAPTOmycin (CUBICIN) 300 mg in sodium chloride 0.9 % 50 mL IVPB        Ordering Provider: Faiza Angulo MD    4 mg/kg × 69.2 kg (Adjusted)  100 mL/hr over 30 Minutes Intravenous Every 48 Hours 04/19/23 1800 04/25/23 1759    04/19/23 1545  piperacillin-tazobactam (ZOSYN) 3.375 g in iso-osmotic dextrose 50 ml  (premix)        Ordering Provider: Breezy Felder MD    3.375 g  over 30 Minutes Intravenous Once 23 1645 23 1752    23 1542  DAPTOmycin (CUBICIN) 300 mg in sodium chloride 0.9 % 50 mL IVPB        Note to Pharmacy: MRSA w/ restriction from Vanco per ID.   Ordering Provider: Kings Herrera MD    300 mg  100 mL/hr over 30 Minutes Intravenous Once 23 1600 23 1801            Review of Systems:  See HPI    Physical Exam:   Vital Signs  Temp (24hrs), Av.8 °F (36.6 °C), Min:97.5 °F (36.4 °C), Max:98.1 °F (36.7 °C)    Temp  Min: 97.5 °F (36.4 °C)  Max: 98.1 °F (36.7 °C)  BP  Min: 137/85  Max: 156/85  Pulse  Min: 64  Max: 77  Resp  Min: 16  Max: 18  SpO2  Min: 93 %  Max: 100 %    GENERAL: She is somnolent today.  HEENT: No labial ulcers    NECK: Supple   LYMPH: No cervical, axillary or inguinal lymphadenopathy.  HEART: RRR; No murmur, rubs, gallops.   LUNGS: .Decreased breath sounds at the right base with scattered rales.  ABDOMEN: Soft, nontender, nondistended.   EXT:  1+ bilateral lower extremity edema.  She has bilateral 1.5 cm heel pressure ulcers.  The right-sided ulcer has decreased surrounding erythema on .  These are non-stageable but may be stage III.  She has a right lateral fourth toe ulcer which is 0..75 x .4 cm  with some decreased erythema and eschar.  This appeared to been 3 mm deep at initial evaluation.  Wounds were not evaluated today as did not take down dressings, but reviewed pictures from .   MSK: No joint effusions or erythema  SKIN: No diffuse rash present.    NEURO: She is somnolent today..                    Laboratory Data    Results from last 7 days   Lab Units 23  0428 23  0349 23  1517   WBC 10*3/mm3 7.25 6.37 8.08   HEMOGLOBIN g/dL 10.6* 10.9* 11.7*   HEMATOCRIT % 34.9 36.3 39.3   PLATELETS 10*3/mm3 200 199 236     Results from last 7 days   Lab Units 23  0313   SODIUM mmol/L 150*   POTASSIUM mmol/L 3.9   CHLORIDE mmol/L  105   CO2 mmol/L 34.0*   BUN mg/dL 67*   CREATININE mg/dL 1.36*   GLUCOSE mg/dL 134*   CALCIUM mg/dL 8.6     Results from last 7 days   Lab Units 04/20/23  0451   ALK PHOS U/L 139*   BILIRUBIN mg/dL 0.8   ALT (SGPT) U/L 11   AST (SGOT) U/L 21         Results from last 7 days   Lab Units 04/17/23  1517   CRP mg/dL 8.02*     Results from last 7 days   Lab Units 04/17/23  1517   LACTATE mmol/L 1.1     Results from last 7 days   Lab Units 04/17/23  1517   CK TOTAL U/L 106         Estimated Creatinine Clearance: 34 mL/min (A) (by C-G formula based on SCr of 1.36 mg/dL (H)).      Microbiology:  Microbiology Results (last 10 days)     Procedure Component Value - Date/Time    Urine Culture - Urine, Urine, Clean Catch [[id number]]  (Abnormal) Collected: 04/22/23 1044    Lab Status: Preliminary result Specimen: Urine, Clean Catch Updated: 04/23/23 0756     Urine Culture 25,000 CFU/mL Gram Negative Bacilli    Narrative:      Colonization of the urinary tract without infection is common. Treatment is discouraged unless the patient is symptomatic, pregnant, or undergoing an invasive urologic procedure.    Body Fluid Culture - Body Fluid, Pleural Cavity [[id number]] Collected: 04/19/23 1220    Lab Status: Final result Specimen: Body Fluid from Pleural Cavity Updated: 04/22/23 0819     Body Fluid Culture No growth at 3 days     Gram Stain Moderate (3+) WBCs seen      No organisms seen    Anaerobic Culture - Pleural Fluid, Pleural Cavity [[id number]]  (Normal) Collected: 04/19/23 1220    Lab Status: Preliminary result Specimen: Pleural Fluid from Pleural Cavity Updated: 04/22/23 0839     Anaerobic Culture No anaerobes isolated at 5 days    Blood Culture - Blood, Arm, Left [[id number]]  (Normal) Collected: 04/17/23 1600    Lab Status: Final result Specimen: Blood from Arm, Left Updated: 04/22/23 1700     Blood Culture No growth at 5 days    Blood Culture - Blood, Arm, Right [[id number]]  (Normal) Collected: 04/17/23 1553    Lab Status:  Final result Specimen: Blood from Arm, Right Updated: 04/22/23 1700     Blood Culture No growth at 5 days        Culture results from last 30 days   Lab 04/19/23  1220 04/12/23  0854   WOUNDCX  --  Moderate growth (3+) Staphylococcus aureus, MRSA*   ANACX No anaerobes isolated at 5 days  --            Radiology:  Imaging Results (Last 72 Hours)     ** No results found for the last 72 hours. **      I read her radiographic studies.      Impression:   1.  MRSA right foot wound infection-with cellulitis of the right fourth toe and bilateral decubitus heel ulcers.  This has occurred in the setting of severe peripheral vascular disease and severe debility.  These will be very difficult to heal.  She is currently on daptomycin therapy.  Her cellulitis has improved.  I will plan to switch her to oral minocycline soon.  2.  Pulmonary edema-She had flash pulmonary edema after her thoracentesis with removal of over 1.3 L of right pleural fluid.  She continues to improved.   3.  Acute hypoxic respiratory failure-secondary to pulmonary edema  4.  Acute on chronic systolic/diastolic heart failure  5.  Paroxysmal atrial fibrillation  6.  History of paroxysmal atrial fibrillation  7.  Severe peripheral vascular disease  8.  Severe debility  9.  Type 2 diabetes mellitus-this places her at increased risk for recurrent infections  10.  Stage III chronic kidney disease  11.  Recent Covid 19 infection-1/28/23.  This has contributed to her debility  12.  GNR urinary colonization.--NEW  13.  Encephalopathy--toxic vs metabolic vs other.     PLAN/RECOMMENDATIONS:   1.  Continue daptomycin  2.  Continue foot wound care  3.  Possible discharge soon    She has been more confused over the past couple of days, urine cx with low count GNR and likely colonization with UA that was not very remarkable.  When ready for discharge, we will plan for her to discharge on oral minocycline.    Copied text in this note has been reviewed and is accurate as  of 04/23/23    PA saw and examined patient today.  D/w MAY Warren-[facility]  [facility]         MAY Barrios  4/23/2023  12:39 EDT

## 2023-04-24 LAB
ANION GAP SERPL CALCULATED.3IONS-SCNC: 5 MMOL/L (ref 5–15)
BACTERIA SPEC AEROBE CULT: ABNORMAL
BACTERIA SPEC ANAEROBE CULT: NORMAL
BUN SERPL-MCNC: 71 MG/DL (ref 8–23)
BUN/CREAT SERPL: 39 (ref 7–25)
CALCIUM SPEC-SCNC: 8.3 MG/DL (ref 8.6–10.5)
CHLORIDE SERPL-SCNC: 104 MMOL/L (ref 98–107)
CK SERPL-CCNC: 44 U/L (ref 20–180)
CO2 SERPL-SCNC: 37 MMOL/L (ref 22–29)
CREAT SERPL-MCNC: 1.82 MG/DL (ref 0.57–1)
DEPRECATED RDW RBC AUTO: 64 FL (ref 37–54)
EGFRCR SERPLBLD CKD-EPI 2021: 27.1 ML/MIN/1.73
ERYTHROCYTE [DISTWIDTH] IN BLOOD BY AUTOMATED COUNT: 16.8 % (ref 12.3–15.4)
GLUCOSE BLDC GLUCOMTR-MCNC: 185 MG/DL (ref 70–130)
GLUCOSE BLDC GLUCOMTR-MCNC: 222 MG/DL (ref 70–130)
GLUCOSE BLDC GLUCOMTR-MCNC: 281 MG/DL (ref 70–130)
GLUCOSE SERPL-MCNC: 225 MG/DL (ref 65–99)
HCT VFR BLD AUTO: 37.9 % (ref 34–46.6)
HGB BLD-MCNC: 10.8 G/DL (ref 12–15.9)
MCH RBC QN AUTO: 29.5 PG (ref 26.6–33)
MCHC RBC AUTO-ENTMCNC: 28.5 G/DL (ref 31.5–35.7)
MCV RBC AUTO: 103.6 FL (ref 79–97)
PLATELET # BLD AUTO: 233 10*3/MM3 (ref 140–450)
PMV BLD AUTO: 10.9 FL (ref 6–12)
POTASSIUM SERPL-SCNC: 4.4 MMOL/L (ref 3.5–5.2)
RBC # BLD AUTO: 3.66 10*6/MM3 (ref 3.77–5.28)
SODIUM SERPL-SCNC: 146 MMOL/L (ref 136–145)
WBC NRBC COR # BLD: 8.45 10*3/MM3 (ref 3.4–10.8)

## 2023-04-24 PROCEDURE — 85027 COMPLETE CBC AUTOMATED: CPT | Performed by: HOSPITALIST

## 2023-04-24 PROCEDURE — 82550 ASSAY OF CK (CPK): CPT

## 2023-04-24 PROCEDURE — 63710000001 INSULIN DETEMIR PER 5 UNITS: Performed by: HOSPITALIST

## 2023-04-24 PROCEDURE — 80048 BASIC METABOLIC PNL TOTAL CA: CPT | Performed by: HOSPITALIST

## 2023-04-24 PROCEDURE — 63710000001 INSULIN LISPRO (HUMAN) PER 5 UNITS: Performed by: INTERNAL MEDICINE

## 2023-04-24 PROCEDURE — 99231 SBSQ HOSP IP/OBS SF/LOW 25: CPT | Performed by: HOSPITALIST

## 2023-04-24 PROCEDURE — 99231 SBSQ HOSP IP/OBS SF/LOW 25: CPT

## 2023-04-24 PROCEDURE — 82962 GLUCOSE BLOOD TEST: CPT

## 2023-04-24 RX ORDER — BUMETANIDE 1 MG/1
1 TABLET ORAL DAILY
Status: DISCONTINUED | OUTPATIENT
Start: 2023-04-24 | End: 2023-04-25

## 2023-04-24 RX ORDER — CLONAZEPAM 0.5 MG/1
0.25 TABLET ORAL 2 TIMES DAILY PRN
Status: DISCONTINUED | OUTPATIENT
Start: 2023-04-24 | End: 2023-04-24

## 2023-04-24 RX ORDER — MINOCYCLINE HYDROCHLORIDE 50 MG/1
100 CAPSULE ORAL EVERY 12 HOURS SCHEDULED
Status: DISCONTINUED | OUTPATIENT
Start: 2023-04-24 | End: 2023-04-28 | Stop reason: HOSPADM

## 2023-04-24 RX ADMIN — FAMOTIDINE 20 MG: 20 TABLET, FILM COATED ORAL at 09:43

## 2023-04-24 RX ADMIN — METOPROLOL SUCCINATE 25 MG: 25 TABLET, EXTENDED RELEASE ORAL at 09:41

## 2023-04-24 RX ADMIN — INSULIN DETEMIR 15 UNITS: 100 INJECTION, SOLUTION SUBCUTANEOUS at 09:28

## 2023-04-24 RX ADMIN — INSULIN LISPRO 2 UNITS: 100 INJECTION, SOLUTION INTRAVENOUS; SUBCUTANEOUS at 17:46

## 2023-04-24 RX ADMIN — APIXABAN 2.5 MG: 2.5 TABLET, FILM COATED ORAL at 20:24

## 2023-04-24 RX ADMIN — Medication 10 ML: at 20:25

## 2023-04-24 RX ADMIN — MINOCYCLINE HYDROCHLORIDE 100 MG: 50 CAPSULE ORAL at 22:30

## 2023-04-24 RX ADMIN — NYSTATIN 500000 UNITS: 100000 SUSPENSION ORAL at 20:25

## 2023-04-24 RX ADMIN — CLONAZEPAM 0.5 MG: 0.5 TABLET ORAL at 02:04

## 2023-04-24 RX ADMIN — INSULIN LISPRO 3 UNITS: 100 INJECTION, SOLUTION INTRAVENOUS; SUBCUTANEOUS at 09:29

## 2023-04-24 RX ADMIN — ATORVASTATIN CALCIUM 40 MG: 40 TABLET, FILM COATED ORAL at 20:25

## 2023-04-24 RX ADMIN — APIXABAN 2.5 MG: 2.5 TABLET, FILM COATED ORAL at 09:42

## 2023-04-24 RX ADMIN — LOSARTAN POTASSIUM 50 MG: 50 TABLET, FILM COATED ORAL at 09:42

## 2023-04-24 RX ADMIN — POLYETHYLENE GLYCOL 3350 17 G: 17 POWDER, FOR SOLUTION ORAL at 09:45

## 2023-04-24 RX ADMIN — NYSTATIN: 100000 POWDER TOPICAL at 09:35

## 2023-04-24 RX ADMIN — SENNOSIDES AND DOCUSATE SODIUM 2 TABLET: 8.6; 5 TABLET ORAL at 09:41

## 2023-04-24 RX ADMIN — NYSTATIN: 100000 POWDER TOPICAL at 20:25

## 2023-04-24 RX ADMIN — BUMETANIDE 1 MG: 1 TABLET ORAL at 09:42

## 2023-04-24 RX ADMIN — CLOPIDOGREL BISULFATE 75 MG: 75 TABLET ORAL at 09:42

## 2023-04-24 RX ADMIN — SERTRALINE HYDROCHLORIDE 50 MG: 50 TABLET ORAL at 09:42

## 2023-04-24 RX ADMIN — INSULIN LISPRO 4 UNITS: 100 INJECTION, SOLUTION INTRAVENOUS; SUBCUTANEOUS at 12:21

## 2023-04-24 NOTE — CASE MANAGEMENT/SOCIAL WORK
Continued Stay Note  [facility]     Patient Name: Susan Anderson  MRN: [id number]  Today's Date: 4/24/2023    Admit Date: 4/17/2023        Discharge Plan     Row Name 04/24/23 1505       Plan    Plan Comments [facility] at [facility] has a bed and they will begin working on a pre cert of her insurance for short term rehab.               Discharge Codes    No documentation.               Expected Discharge Date and Time     Expected Discharge Date Expected Discharge Time    Apr 25, 2023             AMELIA Andres

## 2023-04-24 NOTE — CASE MANAGEMENT/SOCIAL WORK
Continued Stay Note  [facility]     Patient Name: Susan Anderson  MRN: [id number]  Today's Date: 4/24/2023    Admit Date: 4/17/2023        Discharge Plan     Row Name 04/24/23 1012       Plan    Plan Comments Met with the daughter and Mrs. Andreson and she said that she now has decided she would like Mrs. Anderson to be referred to [facility] and [facility] at [facility] and referrals will be made to those facilities on Monday 4/24/2023.               Discharge Codes    No documentation.               Expected Discharge Date and Time     Expected Discharge Date Expected Discharge Time    Apr 24, 2023             AMELIA Andres

## 2023-04-24 NOTE — PLAN OF CARE
Goal Outcome Evaluation:  Plan of Care Reviewed With: patient, daughter        Problem: Adult Inpatient Plan of Care  Goal: Plan of Care Review  Outcome: Ongoing, Progressing  Flowsheets (Taken 4/24/2023 4309)  Progress: no change  Plan of Care Reviewed With:   patient   daughter  Outcome Evaluation: Patient continues to be bradycardic on the monitor with frequent PACs. Drowsiness today and daughter requested PT to postpone therapy. continues on O2 per NC. po intake poor today due to sleepiness. purewick continues. will monitor and maintain safety.     Progress: no change  Outcome Evaluation: Patient continues to be bradycardic on the monitor with frequent PACs. Drowsiness today and daughter requested PT to postpone therapy. continues on O2 per NC. po intake poor today due to sleepiness. purewick continues. will monitor and maintain safety.

## 2023-04-24 NOTE — PROGRESS NOTES
CTS Progress Note      Chief Complaint: Nonhealing heel ulcers bilateral      Subjective  No complaints, no acute events overnight.     Objective    Physical Exam:   Vital Signs   Temp:  [97.1 °F (36.2 °C)-98 °F (36.7 °C)] 97.1 °F (36.2 °C)  Heart Rate:  [50-84] 83  Resp:  [18] 18  BP: (122-155)/(65-95) 122/69   GEN: NAD   CV: Regular rate and rhythm    RESP: Decreased to bases but clear to auscultation bilaterally   ABD: Soft nontender positive bowel    EXT: Warm to the touch no significant edema.  Feet are warm but no palpable pulses      Results     Results from last 7 days   Lab Units 04/24/23  0501   WBC 10*3/mm3 8.45   HEMOGLOBIN g/dL 10.8*   HEMATOCRIT % 37.9   PLATELETS 10*3/mm3 233     Results from last 7 days   Lab Units 04/24/23  0501   SODIUM mmol/L 146*   POTASSIUM mmol/L 4.4   CHLORIDE mmol/L 104   CO2 mmol/L 37.0*   BUN mg/dL 71*   CREATININE mg/dL 1.82*   GLUCOSE mg/dL 225*   CALCIUM mg/dL 8.3*     Results from last 7 days   Lab Units 04/19/23  0840   INR  1.58*     ABIs:  Right Conclusion: The right HOMER is unable to be assessed due to vessel incompressibility. Unable to assess digital ischemia.  Waveforms are difficult to assess due to A-fib and PVCs.  Reported palpable pulses.  •  Left Conclusion: The left HOMER is unable to be assessed due to vessel incompressibility.  Possible severe digital ischemia. Waveforms are difficult to assess due to A-fib and PVCs.  Reported palpable pulses.  •  Findings overall are inconclusive.  Alternative evaluation methods such as an arterial duplex ultrasound or CT angiogram with runoff could be considered if clinically warranted.    Assessment & Plan   Peripheral arterial disease with a history of left SFA angioplasty and stent on 9/28/2022.   Nonhealing bilateral heel ulcers      Plan   -HOMER results noted above, unable to obtain due to vessel and compressibility  -Continue wound care and IV/oral antibiotic therapy per infectious disease and wound care  team  -Currently no plans from a vascular standpoint for surgical intervention  -Follow-up with vascular surgery 1 to 2 weeks following discharge    Sobia Padilla, INDIGO  04/24/23  08:12 EDT

## 2023-04-24 NOTE — PROGRESS NOTES
INFECTIOUS DISEASE Progress Note    Susan Anderson  1940  [id number]    Admission Date: 4/17/2023      Requesting Provider: No ref. provider found  Evaluating Physician: Stef Pete MD    Reason for Consultation: Bilateral foot infections    History of present illness:    4/18/23: Patient is a 83 y.o. female With type 2 diabetes mellitus, stage III chronic kidney disease, coronary artery disease, systolic/diastolic congestive heart failure, Severe peripheral vascular disease, recurrent bilateral foot infections, recent bilateral heel decubitus ulcers, a recent MRSA right toe wound infection, and recent Covid 19 in late January 2023 who is seen today after she presented with dyspnea and fatigue, and malaise. I saw her recently in the office with bilateral heel decubitus ulcers and a right fourth toe ulcer with associated cellulitis.  I initially started her on Augmentin therapy but then switched to minocycline after a right fourth toe culture returned positive for MRSA. I followed her during an admission from 1/3 until 1/20/23 for bibasilar pneumonia, Klebsiella/E. Coli UTI, and PEA arrest with acute hypoxic respiratory failure.  She suffered from acute renal failure requiring dialysis but her dialysis has subsequently been discontinued.  She was readmitted from 1/28 to 2/2/23 for Covid 19 infection for which she received Decadron but was not able to receive remdesivir due to bradycardia.  She was readmitted last night with dyspnea and acute hypoxic respiratory failure.  She has required BiPAP and is currently on 30% FiO2 with an O2 saturation of 98%.  She is lethargic and unable to provide any history. Chest CT scan revealed moderate to large bilateral pleural effusions and bibasilar atelectasis with cardiomegaly.  She is undergoing diuresis.She has been started on intravenous daptomycin.  She has remained afebrile.Her pro-BNP yesterday was 19,401. Her creatinine was 1.63.Her white blood cell count  was 8.1.Her creatinine today is 1.4 and her white blood cell count is 6.4.     4/19/23:She remains afebrile.She is on 3 L of oxygen with an O2 saturation of 93%. X-ray today reveals right greater than left effusions/opacities.  She is scheduled to undergo right thoracentesis today.  She has decreased dyspnea.  She has some nausea but denies vomiting.    4/20/23:She remains afebrile.  Her creatinine is 1.33. Her O2 saturation is 94% on 3 L. She underwent ultrasound-guided thoracentesis of the right pleural effusion yesterday yielding 1.35 L of pleural fluid. She had increased dyspnea immediately after her thoracentesis but this has dramatically improved.  Her chest x-ray shortly after thoracentesis revealed marked improvement but then she had evidence of bilateral congestion consistent with pulmonary edema. She transiently received Zosyn yesterday due to concerns over possible aspiration pneumonia but this has been discontinued by Dr. Felder.    4/21/23:She remains afebrile.Pleural fluid culture from 4/19 no growth so far. Her MRSA right foot isolate from 4/12 was sensitive to tetracycline and Bactrim.  She is currently on 3 L of oxygen with an O2 saturation of 96-99%.  She has decreased dyspnea.  She denies cough and sputum production.    4/22/23: Afebrile.  Pleural fluid culture negative from 4/19.  She is currently on 3L O2 with % O2 sat.  Her feet feel better.  She has no worsening shortness of breath.  She denies cough, n/v/d, rashes.     4/23/23: Afebrile.  Reviewed pictures from 4/22.  She remained on 3L O2 with % O2 sat.  Urine cultures from 4/22 with 25K GNR and UA WBC 6-12 with small LE/negative nitrite.  The culture was ordered d/t worsening nausea.  Per family in room, she has been confused today.     4/24/23:  Her creatinine today is 1.8.  White blood cell count is 8.5.  Urinalysis from 4/22 revealed 6-12 white blood cells. Urine culture is growing 25,000 colonies of ESBL Klebsiella.  She is  currently sedated.  She cannot provide a reliable ROS at present.    Past Medical History:   Diagnosis Date   • Anemia 8/22   • Anxiety    • Arthritis    • Asthma 6/4 - double pneumonia    Currently on inhaler and nebulizer   • Atrial fibrillation 08/21/2022   • Brain tumor     Benign and followed at    • Cancer     cervical cancer, skin cancer   • CHF (congestive heart failure) 06/04/2021   • Chronic kidney disease Related to diabetes   • Clotting disorder 10/22    Upper GI bleed from blood thinners aggravate ulcers   • Coronary artery disease 6/4/2021 DX for hear failure   • COVID-19 01/28/2023   • Depression 8/22   • Diabetes mellitus 30 years    Seeing Dr. Lam 1st time Aug 19   • Dizziness 07/27/2022   • Effusion of right knee 08/12/2022   • Fall 07/27/2022   • GERD (gastroesophageal reflux disease)    • Gout    • History of medical problems 8/22    AFIB   • History of staph infection 10/2021    right toe   • History of transfusion     no reactions, 1 unit, [facility]   • HL (hearing loss)     Acoustic neuroma right   • Hx of colonoscopy    • Hyperlipidemia Reference current labs x 2-3yrs approx   • Hypertension 30 years   • Hypomagnesemia 10/06/2022   • Insomnia    • Low back pain    • Migraine    • Mitral valve disease    • Mitral valve disease    • Osteomyelitis    • Peptic ulceration 10/22    Secondary to blood thinners   • Peripheral neuropathy    • Physical deconditioning 05/17/2022   • Pneumonia due to infectious organism 06/04/2021   • Pressure injury of skin of sacral region 08/21/2022   • Renal insufficiency 2021   • Sepsis 01/28/2023   • Sleep apnea    • Syncope, unspecified syncope type 10/06/2022   • Type 2 diabetes mellitus     30 years   • Weakness 07/27/2022       Past Surgical History:   Procedure Laterality Date   • ABDOMINAL HYSTERECTOMY W/SALPINGECTOMY     • AMPUTATION  Right great toe, 1st 1/3 metatarsal -[place] 4/14/21   • AORTAGRAM N/A 04/09/2021    Procedure: AORTAGRAM WITH OR WITHOUT  RUNOFFS, WITH Co2;  Surgeon: Trey Forrest MD;  Location:  [facility];  Service: Vascular;  Laterality: N/A;   • AORTAGRAM N/A 09/28/2022    Procedure: CO2 ANGIOGRAM, LEFT SFA ANGIOPLASTY WITH DRUG ELUTING BALLOON, LEFT SFA STENT PLACEMENT ;  Surgeon: Trey Forrest MD;  Location:  [facility];  Service: Vascular;  Laterality: N/A;  FLUORO: 13.12  DOSE 162mGy  CONTRAST: Isovue 350: 15ml   • APPENDECTOMY     • BRAIN TUMOR EXCISION      laser surgery    • CARDIAC CATHETERIZATION N/A 06/21/2021    Procedure: LEFT HEART CATH;  Surgeon: Anjum Ceballos MD;  Location:  [facility];  Service: Cardiology;  Laterality: N/A;   • CATARACT EXTRACTION W/ INTRAOCULAR LENS  IMPLANT, BILATERAL     • CHOLECYSTECTOMY     • COLONOSCOPY     • ENDOSCOPY N/A 10/07/2022    Procedure: ESOPHAGOGASTRODUODENOSCOPY;  Surgeon: Stef Veras MD;  Location: [facility];  Service: Gastroenterology;  Laterality: N/A;   • EYE SURGERY     • FEMORAL ARTERY STENT  10/2022   • HYSTERECTOMY     • INTERVENTIONAL RADIOLOGY PROCEDURE N/A 1/15/2023    Procedure: CV INTERVENTIONAL RADIOLOGY PROCEDURE;  Surgeon: Sanket Zapata MD;  Location:  [facility];  Service: Interventional Radiology;  Laterality: N/A;   • TOE SURGERY     • TRANS METATARSAL AMPUTATION Right 04/14/2021    Procedure: AMPUTATION TRANS METATARSAL RIGHT GREAT TOE;  Surgeon: Valdez Finney MD;  Location:  [facility];  Service: Vascular;  Laterality: Right;       Family History   Problem Relation Age of Onset   • Diabetes Mother         Type II   • Heart disease Father    • Heart attack Father    • Coronary artery disease Father    • Diabetes Father         Type II   • Diabetes Sister    • Diabetes Maternal Grandmother    • Diabetes Maternal Grandfather    • Diabetes Paternal Grandmother    • Diabetes Paternal Grandfather        Social History     Socioeconomic History   • Marital status:    • Number of children: 1   Tobacco Use   •  Smoking status: Never   • Smokeless tobacco: Never   Vaping Use   • Vaping Use: Never used   Substance and Sexual Activity   • Alcohol use: Not Currently     Comment: Social drinking when not recovering from hospitalization   • Drug use: Never   • Sexual activity: Not Currently     Birth control/protection: None       Allergies   Allergen Reactions   • Vancomycin Other (See Comments)     Acute Kidney Injury, requested by ID   • Baclofen Other (See Comments) and Hallucinations     PSYCHOSIS-POA REFUSES ADMINISTRATION OF THIS MED.   • Cephalexin Nausea Only   • Erythromycin Base Nausea Only   • Melatonin Other (See Comments)     Nightmares   • Oxycodone Nausea Only   • Protonix [Pantoprazole] Itching and Rash   • Sulfa Antibiotics Nausea Only         Medication:    Current Facility-Administered Medications:   •  apixaban (ELIQUIS) tablet 2.5 mg, 2.5 mg, Oral, Q12H, Breezy Felder MD, 2.5 mg at 04/23/23 2145  •  atorvastatin (LIPITOR) tablet 40 mg, 40 mg, Oral, Nightly, Faiza Angulo MD, 40 mg at 04/23/23 2145  •  sennosides-docusate (PERICOLACE) 8.6-50 MG per tablet 2 tablet, 2 tablet, Oral, BID, 2 tablet at 04/21/23 2035 **AND** polyethylene glycol (MIRALAX) packet 17 g, 17 g, Oral, Daily PRN **AND** bisacodyl (DULCOLAX) EC tablet 5 mg, 5 mg, Oral, Daily PRN **AND** bisacodyl (DULCOLAX) suppository 10 mg, 10 mg, Rectal, Daily PRN, Faiza Angulo MD  •  bumetanide (BUMEX) tablet 1 mg, 1 mg, Oral, Daily, Breezy Felder MD  •  clonazePAM (KlonoPIN) tablet 0.5 mg, 0.5 mg, Oral, BID PRN, Faiza Angulo MD, 0.5 mg at 04/24/23 0204  •  clopidogrel (PLAVIX) tablet 75 mg, 75 mg, Oral, Daily, Faiza Angulo MD, 75 mg at 04/23/23 0846  •  DAPTOmycin (CUBICIN) 300 mg in sodium chloride 0.9 % 50 mL IVPB, 4 mg/kg (Adjusted), Intravenous, Q48H, Neeraj Bernstein PA, Last Rate: 100 mL/hr at 04/23/23 1712, 300 mg at 04/23/23 1712  •  famotidine (PEPCID) tablet 20 mg, 20 mg, Oral, Daily, Breezy Felder MD, 20 mg at  04/23/23 0846  •  insulin detemir (LEVEMIR) injection 15 Units, 15 Units, Subcutaneous, Daily, Breezy Felder MD  •  Insulin Lispro (humaLOG) injection 0-7 Units, 0-7 Units, Subcutaneous, TID With Meals, Faiza Angulo MD, 3 Units at 04/23/23 1712  •  losartan (COZAAR) tablet 50 mg, 50 mg, Oral, Q24H, Anjum Ceballos MD, 50 mg at 04/23/23 0847  •  metoprolol succinate XL (TOPROL-XL) 24 hr tablet 25 mg, 25 mg, Oral, Q24H, Anjum Ceballos MD, 25 mg at 04/23/23 0847  •  nitroglycerin (NITROSTAT) ointment 0.5 inch, 0.5 inch, Topical, Q6H PRN, Faiza Angulo MD  •  nystatin (MYCOSTATIN) 100,000 unit/mL suspension 500,000 Units, 5 mL, Swish & Swallow, 4x Daily, Breezy Felder MD, 500,000 Units at 04/23/23 2144  •  nystatin (MYCOSTATIN) powder, , Topical, Q12H, Breezy Felder MD, Given at 04/23/23 2145  •  ondansetron (ZOFRAN) injection 4 mg, 4 mg, Intravenous, Q6H PRN, Breezy Felder MD, 4 mg at 04/22/23 0602  •  Pharmacy Consult - Pharmacy to dose, , Does not apply, Continuous PRN, Faiza Angulo MD  •  polyethylene glycol (MIRALAX) packet 17 g, 17 g, Oral, Daily, Faiza Angulo MD, 17 g at 04/18/23 0932  •  prochlorperazine (COMPAZINE) injection 5 mg, 5 mg, Intravenous, Q6H PRN, 5 mg at 04/22/23 0915 **OR** prochlorperazine (COMPAZINE) tablet 5 mg, 5 mg, Oral, Q6H PRN **OR** prochlorperazine (COMPAZINE) suppository 25 mg, 25 mg, Rectal, Q12H PRN, Breezy Felder MD  •  sertraline (ZOLOFT) tablet 50 mg, 50 mg, Oral, Daily, Faiza Angulo MD, 50 mg at 04/23/23 0846  •  sodium chloride 0.9 % flush 10 mL, 10 mL, Intravenous, PRN, Faiza Angulo MD, 10 mL at 04/23/23 0847  •  traMADol (ULTRAM) tablet 25 mg, 25 mg, Oral, Q12H PRN, Faiza Angulo MD, 25 mg at 04/18/23 0937    Antibiotics:  Anti-Infectives (From admission, onward)    Ordered     Dose/Rate Route Frequency Start Stop    04/17/23 2112  DAPTOmycin (CUBICIN) 300 mg in sodium chloride 0.9 % 50 mL IVPB        Ordering Provider: Neeraj Bernstein  PA    4 mg/kg × 69.2 kg (Adjusted)  100 mL/hr over 30 Minutes Intravenous Every 48 Hours 23 1800 23 1240    23 1545  piperacillin-tazobactam (ZOSYN) 3.375 g in iso-osmotic dextrose 50 ml (premix)        Ordering Provider: Breezy Felder MD    3.375 g  over 30 Minutes Intravenous Once 23 1645 23 1752    23 1542  DAPTOmycin (CUBICIN) 300 mg in sodium chloride 0.9 % 50 mL IVPB        Note to Pharmacy: MRSA w/ restriction from Vanco per ID.   Ordering Provider: Kings Herrera MD    300 mg  100 mL/hr over 30 Minutes Intravenous Once 23 1600 23 1801            Review of Systems:  See HPI    Physical Exam:   Vital Signs  Temp (24hrs), Av.5 °F (36.4 °C), Min:97.1 °F (36.2 °C), Max:98 °F (36.7 °C)    Temp  Min: 97.1 °F (36.2 °C)  Max: 98 °F (36.7 °C)  BP  Min: 122/69  Max: 155/74  Pulse  Min: 50  Max: 84  Resp  Min: 18  Max: 18  SpO2  Min: 90 %  Max: 100 %    GENERAL: Somnolent today.  HEENT: No labial ulcers    NECK: Supple   LYMPH: No cervical, axillary or inguinal lymphadenopathy.  HEART: RRR; No murmur, rubs, gallops.   LUNGS: .Decreased breath sounds at the right base with scattered rales.  ABDOMEN: Soft, nontender, nondistended.   EXT:  1+ bilateral lower extremity edema.  She has bilateral 1.5 cm heel pressure ulcers.  The lateral right fourth toe ulcer has only minimal surrounding erythema present.  MSK: No joint effusions or erythema  SKIN: No diffuse rash present.    NEURO: Somnolent and difficult to arouse.                    Laboratory Data    Results from last 7 days   Lab Units 23  0501 23  0428 23  0349   WBC 10*3/mm3 8.45 7.25 6.37   HEMOGLOBIN g/dL 10.8* 10.6* 10.9*   HEMATOCRIT % 37.9 34.9 36.3   PLATELETS 10*3/mm3 233 200 199     Results from last 7 days   Lab Units 23  0501   SODIUM mmol/L 146*   POTASSIUM mmol/L 4.4   CHLORIDE mmol/L 104   CO2 mmol/L 37.0*   BUN mg/dL 71*   CREATININE mg/dL 1.82*   GLUCOSE mg/dL 225*    CALCIUM mg/dL 8.3*     Results from last 7 days   Lab Units 04/20/23  0451   ALK PHOS U/L 139*   BILIRUBIN mg/dL 0.8   ALT (SGPT) U/L 11   AST (SGOT) U/L 21         Results from last 7 days   Lab Units 04/17/23  1517   CRP mg/dL 8.02*     Results from last 7 days   Lab Units 04/17/23  1517   LACTATE mmol/L 1.1     Results from last 7 days   Lab Units 04/24/23  0501 04/17/23  1517   CK TOTAL U/L 44 106         Estimated Creatinine Clearance: 25.3 mL/min (A) (by C-G formula based on SCr of 1.82 mg/dL (H)).      Microbiology:  Microbiology Results (last 10 days)     Procedure Component Value - Date/Time    Urine Culture - Urine, Urine, Clean Catch [[id number]]  (Abnormal)  (Susceptibility) Collected: 04/22/23 1044    Lab Status: Final result Specimen: Urine, Clean Catch Updated: 04/24/23 0729     Urine Culture 25,000 CFU/mL Klebsiella pneumoniae ESBL     Comment:   Consider infectious disease consult.  Susceptibility results may not correlate to clinical outcomes.       Narrative:      Colonization of the urinary tract without infection is common. Treatment is discouraged unless the patient is symptomatic, pregnant, or undergoing an invasive urologic procedure.  Recent outcomes data supports the use of pip/tazo in the treatment of susceptible ESBL infections for uncomplicated UTI. Consider use of pip/tazo as a carbapenem-sparing regimen in applicable patients.    Susceptibility      Klebsiella pneumoniae ESBL      NAIMA      Ertapenem Susceptible      Gentamicin Susceptible      Levofloxacin Intermediate      Meropenem Susceptible      Nitrofurantoin Intermediate      Piperacillin + Tazobactam Resistant     Trimethoprim + Sulfamethoxazole Susceptible                           Body Fluid Culture - Body Fluid, Pleural Cavity [[id number]] Collected: 04/19/23 1220    Lab Status: Final result Specimen: Body Fluid from Pleural Cavity Updated: 04/22/23 0819     Body Fluid Culture No growth at 3 days     Gram Stain Moderate (3+)  WBCs seen      No organisms seen    Anaerobic Culture - Pleural Fluid, Pleural Cavity [[id number]]  (Normal) Collected: 04/19/23 1220    Lab Status: Final result Specimen: Pleural Fluid from Pleural Cavity Updated: 04/24/23 0648     Anaerobic Culture No anaerobes isolated at 5 days    Blood Culture - Blood, Arm, Left [[id number]]  (Normal) Collected: 04/17/23 1600    Lab Status: Final result Specimen: Blood from Arm, Left Updated: 04/22/23 1700     Blood Culture No growth at 5 days    Blood Culture - Blood, Arm, Right [[id number]]  (Normal) Collected: 04/17/23 1553    Lab Status: Final result Specimen: Blood from Arm, Right Updated: 04/22/23 1700     Blood Culture No growth at 5 days        Culture results from last 30 days   Lab 04/19/23  1220 04/12/23  0854   WOUNDCX  --  Moderate growth (3+) Staphylococcus aureus, MRSA*   ANACX No anaerobes isolated at 5 days  --            Radiology:  Imaging Results (Last 72 Hours)     ** No results found for the last 72 hours. **      I read her radiographic studies.      Impression:   1.  MRSA right foot wound infection-with cellulitis of the right fourth toe and bilateral decubitus heel ulcers.  This has occurred in the setting of severe peripheral vascular disease and severe debility.  These will be very difficult to heal.  She is currently on daptomycin therapy.  Her cellulitis has improved.  I will plan to switch her to oral minocycline today.  2.  Pulmonary edema-She had flash pulmonary edema after her thoracentesis with removal of over 1.3 L of right pleural fluid.  She continues to improved.   3.  Acute hypoxic respiratory failure-secondary to pulmonary edema  4.  Acute on chronic systolic/diastolic heart failure  5.  Paroxysmal atrial fibrillation  6.  History of paroxysmal atrial fibrillation  7.  Severe peripheral vascular disease  8.  Severe debility  9.  Type 2 diabetes mellitus-this places her at increased risk for recurrent infections  10.  Stage III chronic kidney  disease  11.  Recent Covid 19 infection-1/28/23.  This has contributed to her debility  12.  GNR urinary colonization.--NEW  13.  Encephalopathy--toxic vs metabolic vs other.     PLAN/RECOMMENDATIONS:   1.  Discontinue daptomycin  2.  Minocycline 100 mg p.o. twice daily x10 days  3.  Discharge soon    I discussed her complex situation again with her daughter today.       Stef Pete MD  4/24/2023  08:48 EDT

## 2023-04-24 NOTE — PROGRESS NOTES
[facility] Medicine Services  PROGRESS NOTE    Patient Name: Susan Anderson  : 1940  MRN: [id number]    Date of Admission: 2023  Primary Care Physician: Arleen Doherty MD    Subjective   Subjective     CC: AMS    HPI: Up in bed. Dyspnea remains better. Nausea and PO intake better.    Review of Systems   Constitutional: Positive for activity change and fatigue.   HENT: Negative.  Negative for trouble swallowing.    Respiratory: Negative for chest tightness and shortness of breath.    Cardiovascular: Negative for leg swelling.   Gastrointestinal: Positive for nausea.   Genitourinary: Negative.        Vital Signs:   Temp:  [97.1 °F (36.2 °C)-98 °F (36.7 °C)] 97.2 °F (36.2 °C)  Heart Rate:  [50-84] 75  Resp:  [18] 18  BP: (122-168)/(56-98) 168/89  Flow (L/min):  [3-4] 3.5     Physical Exam:  NAD, alert and oriented x 3  OP clear, MMM  Neck supple  No LAD  RRR  Improved BS B, still remains decreased at bases, same on L  +BS, soft  GARG  Normal affect  No change from     Results Reviewed:  LAB RESULTS:      Lab 23  0501 23  0451 23  0840 23  0428 23  0349 23  1517   WBC 8.45  --   --  7.25 6.37 8.08   HEMOGLOBIN 10.8*  --   --  10.6* 10.9* 11.7*   HEMATOCRIT 37.9  --   --  34.9 36.3 39.3   PLATELETS 233  --   --  200 199 236   NEUTROS ABS  --   --   --   --  4.16 6.11   IMMATURE GRANS (ABS)  --   --   --   --  0.67* 0.51*   LYMPHS ABS  --   --   --   --  0.86 0.82   MONOS ABS  --   --   --   --  0.59 0.58   EOS ABS  --   --   --   --  0.07 0.04   .6*  --   --  99.7* 99.7* 101.6*   CRP  --   --   --   --   --  8.02*   PROCALCITONIN  --   --   --   --   --  0.13   LACTATE  --   --   --   --   --  1.1   LDH  --  313*  --   --   --  527*   PROTIME  --   --  18.9*  --   --   --    D DIMER QUANT  --   --   --   --   --  2.10*         Lab 23  0501 23  0313 23  0451 23  0428 23  0543 23  1849  04/17/23  1517   SODIUM 146* 150* 148* 149* 148*  --  142   POTASSIUM 4.4 3.9 3.8 4.3 4.9  --  5.7*   CHLORIDE 104 105 106 104 103  --  102   CO2 37.0* 34.0* 35.0* 30.0* 26.0  --  31.0*   ANION GAP 5.0 11.0 7.0 15.0 19.0*  --  9.0   BUN 71* 67* 67* 71* 76*  --  80*   CREATININE 1.82* 1.36* 1.33* 1.39* 1.40*  --  1.63*   EGFR 27.1* 38.5* 39.5* 37.5* 37.2*  --  31.0*   GLUCOSE 225* 134* 167* 216* 160*  --  289*   CALCIUM 8.3* 8.6 7.8* 8.3* 8.6  --  8.4*   MAGNESIUM  --   --   --   --   --   --  2.1   TSH  --   --   --   --   --  3.640  --          Lab 04/20/23  0451 04/19/23  0428 04/17/23  1517   TOTAL PROTEIN 4.4* 4.9* 5.9*   ALBUMIN 2.3* 2.3* 2.8*   GLOBULIN 2.1 2.6 3.1   ALT (SGPT) 11 13 22   AST (SGOT) 21 19 40*   BILIRUBIN 0.8 0.8 0.6   ALK PHOS 139* 164* 208*         Lab 04/19/23  0840 04/17/23  2027 04/17/23  1841 04/17/23  1517   PROBNP  --   --   --  19,401.0*  18,084.0*   HSTROP T  --  310* 331* 326*   PROTIME 18.9*  --   --   --    INR 1.58*  --   --   --              Lab 04/17/23  1517   FERRITIN 63.04         Lab 04/18/23  0651 04/18/23  0329 04/18/23  0024   FIO2 30 30 21   CARBOXYHEMOGLOBIN (VENOUS) 1.9 1.9 1.7     Brief Urine Lab Results  (Last result in the past 365 days)      Color   Clarity   Blood   Leuk Est   Nitrite   Protein   CREAT   Urine HCG        04/22/23 1044 Yellow   Cloudy   Small (1+)   Small (1+)   Negative   >=300 mg/dL (3+)                 Microbiology Results Abnormal     Procedure Component Value - Date/Time    Anaerobic Culture - Pleural Fluid, Pleural Cavity [[id number]]  (Normal) Collected: 04/19/23 1220    Lab Status: Final result Specimen: Pleural Fluid from Pleural Cavity Updated: 04/24/23 0648     Anaerobic Culture No anaerobes isolated at 5 days    Blood Culture - Blood, Arm, Right [[id number]]  (Normal) Collected: 04/17/23 1553    Lab Status: Final result Specimen: Blood from Arm, Right Updated: 04/22/23 1700     Blood Culture No growth at 5 days    Blood Culture - Blood,  Arm, Left [[id number]]  (Normal) Collected: 04/17/23 1600    Lab Status: Final result Specimen: Blood from Arm, Left Updated: 04/22/23 1700     Blood Culture No growth at 5 days    Body Fluid Culture - Body Fluid, Pleural Cavity [[id number]] Collected: 04/19/23 1220    Lab Status: Final result Specimen: Body Fluid from Pleural Cavity Updated: 04/22/23 0819     Body Fluid Culture No growth at 3 days     Gram Stain Moderate (3+) WBCs seen      No organisms seen          No radiology results from the last 24 hrs    Results for orders placed during the hospital encounter of 04/17/23    Adult Transthoracic Echo Complete W/ Cont if Necessary Per Protocol    Interpretation Summary  •  Left ventricular systolic function is moderately decreased. Left ventricular ejection fraction appears to be 36 - 40%.  •  Left ventricular diastolic function is consistent with (grade I) impaired relaxation.  •  Trace to mild mitral vegetation.  •  A left sided pleural effusion is noted.      Current medications:  Scheduled Meds:apixaban, 2.5 mg, Oral, Q12H  atorvastatin, 40 mg, Oral, Nightly  bumetanide, 1 mg, Oral, Daily  clopidogrel, 75 mg, Oral, Daily  DAPTOmycin, 4 mg/kg (Adjusted), Intravenous, Q48H  famotidine, 20 mg, Oral, Daily  insulin detemir, 15 Units, Subcutaneous, Daily  insulin lispro, 0-7 Units, Subcutaneous, TID With Meals  losartan, 50 mg, Oral, Q24H  metoprolol succinate XL, 25 mg, Oral, Q24H  nystatin, 5 mL, Swish & Swallow, 4x Daily  nystatin, , Topical, Q12H  polyethylene glycol, 17 g, Oral, Daily  senna-docusate sodium, 2 tablet, Oral, BID  sertraline, 50 mg, Oral, Daily      Continuous Infusions:Pharmacy Consult - Pharmacy to dose,       PRN Meds:.•  senna-docusate sodium **AND** polyethylene glycol **AND** bisacodyl **AND** bisacodyl  •  clonazePAM  •  nitroglycerin  •  ondansetron  •  Pharmacy Consult - Pharmacy to dose  •  prochlorperazine **OR** prochlorperazine **OR** prochlorperazine  •  sodium chloride  •   traMADol    Assessment & Plan   Assessment & Plan     Active Hospital Problems    Diagnosis  POA   • **Cellulitis of right foot due to methicillin-resistant Staphylococcus aureus [L03.115, B95.62]  Yes   • Acute respiratory failure with hypoxia and hypercarbia [J96.01, J96.02]  Unknown   • T2DM  [E11.9]  Yes   • Paroxysmal atrial fibrillation [I48.0]  Yes   • Chronic combined systolic and diastolic heart failure  [I50.42]  Yes   • NICM (nonischemic cardiomyopathy) ([facility]) [I42.8]  Yes   • Dyslipidemia [E78.5]  Yes   • Mitral valve disease [I05.9]  Yes   • Acute on chronic systolic CHF (congestive heart failure) [I50.23]  Yes   • Diabetic foot ulcer [E11.621, L97.509]  Yes   • PVD (peripheral vascular disease) ([facility]) [I73.9]  Yes   • Diabetes mellitus with neuropathy [E11.40]  Yes   • Stage 3a chronic kidney disease [N18.31]  Yes   • Essential hypertension [I10]  Yes      Resolved Hospital Problems   No resolved problems to display.        Brief Hospital Course to date:  Susan Anderson is a 82 y.o. chronically ill female w/ HTN, HLD, CKD3, CAD, PAD, HFrEF (41-45%), PUD/GIB, pAfib, DM2, with 6 admissions since August 2022 (predominantly for CHF). She was admitted 1/3-1/20 after PEA arrest requiring CPR after receiving propofol for EGD; and then COVID and acute renal failure requiring initiation of dialysis. Since then she has been battling bilateral heel wounds and been following with [facility] and Dr. Finney and outpatient PT. She has had peripheral stent placed as well.     Metabolic encephalopathy  -NIPPV as needed  -monitor progress, better     A/C HFrEF-ED 40%  Bilateral pleural effusions  -on PO bumex, ARB, BB  -better overall    Creatinine increased  -monitor, on PO diuretics, decreased, but decreased PO intake over weekened  -home tomorrow if renal function stable    B effusions R>L  -s/p R thoracentesis with over 1L removed, this admission  -better overall, continue diuretics per cardiology  -no plans for L effusion  at this time, she has improved significantly  -symptoms better overall    Acute nausea  -zofrna/compazine prn  -no constipation  -no obvious thrush appreciated, but add nystatin swish/swallow, notes dysgeusia  -UA unremarkable  -abdominal exam unremarkable  -better today    Hx of PAF  -Eliquis    Hx of PUD/GIB  -PPI allergy listed  -pepcid     Chronic bilateral heel wounds  -followed by Dr. Finney/[facility]  -on daptomycin, can change to minocycline at DC per ID  -wound care  -outpatient follow up with vascular surgery     Deconditioned  -PT/OT     T2DM  -SSI, titrated basal, trending better     DVT prophylaxis: Eliquis    Expected Discharge Location and Transportation: Rehab  Expected Discharge   Expected Discharge Date and Time     Expected Discharge Date Expected Discharge Time    Apr 25, 2023            DVT prophylaxis:  Medical DVT prophylaxis orders are present.     AM-PAC 6 Clicks Score (PT): 9 (04/23/23 0800)    CODE STATUS:   Code Status and Medical Interventions:   Ordered at: 04/17/23 1800     Code Status (Patient has no pulse and is not breathing):    No CPR (Do Not Attempt to Resuscitate)     Medical Interventions (Patient has pulse or is breathing):    Full       Breezy Felder MD  04/24/23

## 2023-04-24 NOTE — PLAN OF CARE
Problem: Adult Inpatient Plan of Care  Goal: Plan of Care Review  Outcome: Ongoing, Not Progressing  Flowsheets (Taken 4/24/2023 0228)  Progress: no change  Plan of Care Reviewed With: patient  Outcome Evaluation: Pt resting in bed at this time. Pt c/o anxiety, received prn medication to facilitate rest.     Problem: Adult Inpatient Plan of Care  Goal: Patient-Specific Goal (Individualized)  Outcome: Ongoing, Progressing  Flowsheets (Taken 4/24/2023 0228)  Patient-Specific Goals (Include Timeframe): pt will turn with assistance every 2 hours  Individualized Care Needs: meds, physical care, emotional support  Anxieties, Fears or Concerns: generalized anxiety and anxiety over breathing  Goal: Absence of Hospital-Acquired Illness or Injury  Outcome: Ongoing, Progressing  Intervention: Identify and Manage Fall Risk  Recent Flowsheet Documentation  Taken 4/24/2023 0200 by Desiree Lyon, RN  Safety Promotion/Fall Prevention:   safety round/check completed   toileting scheduled   room organization consistent   nonskid shoes/slippers when out of bed   lighting adjusted   fall prevention program maintained   clutter free environment maintained   assistive device/personal items within reach   activity supervised  Taken 4/24/2023 0000 by Desiree Lyon, RN  Safety Promotion/Fall Prevention:   safety round/check completed   toileting scheduled   room organization consistent   nonskid shoes/slippers when out of bed   lighting adjusted   fall prevention program maintained   clutter free environment maintained   assistive device/personal items within reach   activity supervised  Taken 4/23/2023 2000 by Desiree Lyon, RN  Safety Promotion/Fall Prevention:   safety round/check completed   toileting scheduled   room organization consistent   nonskid shoes/slippers when out of bed   lighting adjusted   fall prevention program maintained   clutter free environment maintained   assistive device/personal items within reach   activity  supervised  Intervention: Prevent Skin Injury  Recent Flowsheet Documentation  Taken 4/24/2023 0200 by Desiree Lyon RN  Body Position:   turned   legs elevated  Taken 4/24/2023 0000 by Desiree Lyon RN  Body Position:   legs elevated   weight shifting   side-lying  Taken 4/23/2023 2200 by Desiree Lyon RN  Body Position:   turned   right   legs elevated  Taken 4/23/2023 2000 by Desiree Lyon RN  Body Position: position changed independently  Intervention: Prevent and Manage VTE (Venous Thromboembolism) Risk  Recent Flowsheet Documentation  Taken 4/24/2023 0200 by Desiree Lyon RN  Activity Management: activity encouraged  Taken 4/24/2023 0000 by Desiree Lyon RN  Activity Management: activity encouraged  Taken 4/23/2023 2200 by Desiree Lyon RN  Activity Management: activity encouraged  Taken 4/23/2023 2000 by Desiree Lyon RN  Activity Management: activity encouraged  Intervention: Prevent Infection  Recent Flowsheet Documentation  Taken 4/24/2023 0200 by Desiree Lyon RN  Infection Prevention:   single patient room provided   rest/sleep promoted   hand hygiene promoted   environmental surveillance performed  Taken 4/24/2023 0000 by Desiree Lyon RN  Infection Prevention:   single patient room provided   rest/sleep promoted   hand hygiene promoted   environmental surveillance performed  Taken 4/23/2023 2200 by Desiree Lyon RN  Infection Prevention:   single patient room provided   rest/sleep promoted   hand hygiene promoted   environmental surveillance performed  Taken 4/23/2023 2000 by Desiree Lyon RN  Infection Prevention:   single patient room provided   rest/sleep promoted   hand hygiene promoted   environmental surveillance performed  Goal: Optimal Comfort and Wellbeing  Outcome: Ongoing, Progressing  Intervention: Provide Person-Centered Care  Recent Flowsheet Documentation  Taken 4/23/2023 2000 by Desiree Lyon RN  Trust Relationship/Rapport:   care explained   choices provided   emotional  support provided   empathic listening provided   questions answered   questions encouraged   reassurance provided   thoughts/feelings acknowledged   Goal Outcome Evaluation:  Plan of Care Reviewed With: patient        Progress: no change  Outcome Evaluation: Pt resting in bed at this time. Pt c/o anxiety, received prn medication to facilitate rest.

## 2023-04-24 NOTE — CASE MANAGEMENT/SOCIAL WORK
Continued Stay Note  [facility]     Patient Name: Susan Anderson  MRN: [id number]  Today's Date: 4/24/2023    Admit Date: 4/17/2023        Discharge Plan     Row Name 04/24/23 1140       Plan    Plan Comments [facility] is not able to accept Mrs. Anderson because they do not have a private room on the skilled rehab unit.    Row Name 04/24/23 1012       Plan    Plan Comments Met with the daughter and Mrs. Anderson and she said that she now has decided she would like Mrs. Anderson to be referred to [facility] and [facility] and referrals will be made to those facilities on Monday 4/24/2023.               Discharge Codes    No documentation.               Expected Discharge Date and Time     Expected Discharge Date Expected Discharge Time    Apr 25, 2023             AMELIA Andres

## 2023-04-25 ENCOUNTER — APPOINTMENT (OUTPATIENT)
Dept: GENERAL RADIOLOGY | Facility: HOSPITAL | Age: 83
DRG: 682 | End: 2023-04-25
Payer: MEDICARE

## 2023-04-25 LAB
ANION GAP SERPL CALCULATED.3IONS-SCNC: 9 MMOL/L (ref 5–15)
B PARAPERT DNA SPEC QL NAA+PROBE: NOT DETECTED
B PERT DNA SPEC QL NAA+PROBE: NOT DETECTED
BUN SERPL-MCNC: 69 MG/DL (ref 8–23)
BUN/CREAT SERPL: 39.7 (ref 7–25)
C PNEUM DNA NPH QL NAA+NON-PROBE: NOT DETECTED
CALCIUM SPEC-SCNC: 8.2 MG/DL (ref 8.6–10.5)
CHLORIDE SERPL-SCNC: 107 MMOL/L (ref 98–107)
CO2 SERPL-SCNC: 35 MMOL/L (ref 22–29)
CREAT SERPL-MCNC: 1.74 MG/DL (ref 0.57–1)
DEPRECATED RDW RBC AUTO: 62.2 FL (ref 37–54)
EGFRCR SERPLBLD CKD-EPI 2021: 28.6 ML/MIN/1.73
ERYTHROCYTE [DISTWIDTH] IN BLOOD BY AUTOMATED COUNT: 16.6 % (ref 12.3–15.4)
FLUAV SUBTYP SPEC NAA+PROBE: NOT DETECTED
FLUBV RNA ISLT QL NAA+PROBE: NOT DETECTED
GLUCOSE BLDC GLUCOMTR-MCNC: 132 MG/DL (ref 70–130)
GLUCOSE BLDC GLUCOMTR-MCNC: 142 MG/DL (ref 70–130)
GLUCOSE BLDC GLUCOMTR-MCNC: 208 MG/DL (ref 70–130)
GLUCOSE BLDC GLUCOMTR-MCNC: 63 MG/DL (ref 70–130)
GLUCOSE BLDC GLUCOMTR-MCNC: 63 MG/DL (ref 70–130)
GLUCOSE SERPL-MCNC: 58 MG/DL (ref 65–99)
HADV DNA SPEC NAA+PROBE: NOT DETECTED
HCOV 229E RNA SPEC QL NAA+PROBE: NOT DETECTED
HCOV HKU1 RNA SPEC QL NAA+PROBE: NOT DETECTED
HCOV NL63 RNA SPEC QL NAA+PROBE: NOT DETECTED
HCOV OC43 RNA SPEC QL NAA+PROBE: NOT DETECTED
HCT VFR BLD AUTO: 37.1 % (ref 34–46.6)
HGB BLD-MCNC: 10.6 G/DL (ref 12–15.9)
HMPV RNA NPH QL NAA+NON-PROBE: NOT DETECTED
HPIV1 RNA ISLT QL NAA+PROBE: NOT DETECTED
HPIV2 RNA SPEC QL NAA+PROBE: NOT DETECTED
HPIV3 RNA NPH QL NAA+PROBE: DETECTED
HPIV4 P GENE NPH QL NAA+PROBE: NOT DETECTED
M PNEUMO IGG SER IA-ACNC: NOT DETECTED
MCH RBC QN AUTO: 29.2 PG (ref 26.6–33)
MCHC RBC AUTO-ENTMCNC: 28.6 G/DL (ref 31.5–35.7)
MCV RBC AUTO: 102.2 FL (ref 79–97)
NT-PROBNP SERPL-MCNC: ABNORMAL PG/ML (ref 0–1800)
PLATELET # BLD AUTO: 234 10*3/MM3 (ref 140–450)
PMV BLD AUTO: 10.5 FL (ref 6–12)
POTASSIUM SERPL-SCNC: 4.2 MMOL/L (ref 3.5–5.2)
RBC # BLD AUTO: 3.63 10*6/MM3 (ref 3.77–5.28)
RHINOVIRUS RNA SPEC NAA+PROBE: NOT DETECTED
RSV RNA NPH QL NAA+NON-PROBE: NOT DETECTED
SARS-COV-2 RNA NPH QL NAA+NON-PROBE: NOT DETECTED
SODIUM SERPL-SCNC: 151 MMOL/L (ref 136–145)
WBC NRBC COR # BLD: 7.25 10*3/MM3 (ref 3.4–10.8)

## 2023-04-25 PROCEDURE — 99232 SBSQ HOSP IP/OBS MODERATE 35: CPT

## 2023-04-25 PROCEDURE — 97110 THERAPEUTIC EXERCISES: CPT

## 2023-04-25 PROCEDURE — 99231 SBSQ HOSP IP/OBS SF/LOW 25: CPT | Performed by: STUDENT IN AN ORGANIZED HEALTH CARE EDUCATION/TRAINING PROGRAM

## 2023-04-25 PROCEDURE — 85027 COMPLETE CBC AUTOMATED: CPT | Performed by: HOSPITALIST

## 2023-04-25 PROCEDURE — 25010000002 ONDANSETRON PER 1 MG: Performed by: HOSPITALIST

## 2023-04-25 PROCEDURE — 71045 X-RAY EXAM CHEST 1 VIEW: CPT

## 2023-04-25 PROCEDURE — 97530 THERAPEUTIC ACTIVITIES: CPT

## 2023-04-25 PROCEDURE — 63710000001 INSULIN LISPRO (HUMAN) PER 5 UNITS: Performed by: INTERNAL MEDICINE

## 2023-04-25 PROCEDURE — 80048 BASIC METABOLIC PNL TOTAL CA: CPT | Performed by: HOSPITALIST

## 2023-04-25 PROCEDURE — 83880 ASSAY OF NATRIURETIC PEPTIDE: CPT | Performed by: INTERNAL MEDICINE

## 2023-04-25 PROCEDURE — 82962 GLUCOSE BLOOD TEST: CPT

## 2023-04-25 PROCEDURE — 0202U NFCT DS 22 TRGT SARS-COV-2: CPT | Performed by: INTERNAL MEDICINE

## 2023-04-25 PROCEDURE — 99233 SBSQ HOSP IP/OBS HIGH 50: CPT | Performed by: INTERNAL MEDICINE

## 2023-04-25 RX ORDER — BUMETANIDE 0.25 MG/ML
2 INJECTION INTRAMUSCULAR; INTRAVENOUS 2 TIMES DAILY
Status: DISCONTINUED | OUTPATIENT
Start: 2023-04-25 | End: 2023-04-27

## 2023-04-25 RX ORDER — BUMETANIDE 1 MG/1
1 TABLET ORAL
Status: DISCONTINUED | OUTPATIENT
Start: 2023-04-25 | End: 2023-04-25

## 2023-04-25 RX ADMIN — METOPROLOL SUCCINATE 25 MG: 25 TABLET, EXTENDED RELEASE ORAL at 09:31

## 2023-04-25 RX ADMIN — FAMOTIDINE 20 MG: 20 TABLET, FILM COATED ORAL at 09:25

## 2023-04-25 RX ADMIN — BUMETANIDE 1 MG: 1 TABLET ORAL at 09:19

## 2023-04-25 RX ADMIN — NYSTATIN 500000 UNITS: 100000 SUSPENSION ORAL at 09:26

## 2023-04-25 RX ADMIN — INSULIN LISPRO 2 UNITS: 100 INJECTION, SOLUTION INTRAVENOUS; SUBCUTANEOUS at 17:25

## 2023-04-25 RX ADMIN — BUMETANIDE 2 MG: 0.25 INJECTION INTRAMUSCULAR; INTRAVENOUS at 21:01

## 2023-04-25 RX ADMIN — SENNOSIDES AND DOCUSATE SODIUM 2 TABLET: 8.6; 5 TABLET ORAL at 09:26

## 2023-04-25 RX ADMIN — NYSTATIN 500000 UNITS: 100000 SUSPENSION ORAL at 21:01

## 2023-04-25 RX ADMIN — NYSTATIN: 100000 POWDER TOPICAL at 21:01

## 2023-04-25 RX ADMIN — NYSTATIN 500000 UNITS: 100000 SUSPENSION ORAL at 11:42

## 2023-04-25 RX ADMIN — MINOCYCLINE HYDROCHLORIDE 100 MG: 50 CAPSULE ORAL at 09:30

## 2023-04-25 RX ADMIN — LOSARTAN POTASSIUM 50 MG: 50 TABLET, FILM COATED ORAL at 09:19

## 2023-04-25 RX ADMIN — ATORVASTATIN CALCIUM 40 MG: 40 TABLET, FILM COATED ORAL at 21:01

## 2023-04-25 RX ADMIN — ONDANSETRON 4 MG: 2 INJECTION INTRAMUSCULAR; INTRAVENOUS at 16:45

## 2023-04-25 RX ADMIN — CLOPIDOGREL BISULFATE 75 MG: 75 TABLET ORAL at 09:25

## 2023-04-25 RX ADMIN — APIXABAN 2.5 MG: 2.5 TABLET, FILM COATED ORAL at 09:25

## 2023-04-25 RX ADMIN — MINOCYCLINE HYDROCHLORIDE 100 MG: 50 CAPSULE ORAL at 21:01

## 2023-04-25 RX ADMIN — Medication 10 ML: at 21:01

## 2023-04-25 RX ADMIN — APIXABAN 2.5 MG: 2.5 TABLET, FILM COATED ORAL at 21:00

## 2023-04-25 RX ADMIN — NYSTATIN: 100000 POWDER TOPICAL at 09:27

## 2023-04-25 RX ADMIN — SERTRALINE HYDROCHLORIDE 50 MG: 50 TABLET ORAL at 09:25

## 2023-04-25 NOTE — PLAN OF CARE
Goal Outcome Evaluation: PT STABLE. LETHARGIC OVERNIGHT. DAUGHTER UPDATED BY PHONE. PROVIDER CALLED TO DISCONTINUE CLONOPIN PER FAMILY REQUEST. VITALS STABLE, GCS 15. WILL CONTINUE TO MONITOR.

## 2023-04-25 NOTE — PROGRESS NOTES
"  [facility]  PROGRESS NOTE    Date of Admission: 4/17/2023  Date of Service: 04/25/23    Primary Care Physician: Arleen Doherty MD    Chief Complaint: Acute on chronic combined heart failure      Subjective      HPI: Patient drowsy, but responsive once aroused. She reports she feels worse in general but especially feels more short of breath.      Objective   Vitals: /74 (BP Location: Left arm, Patient Position: Sitting)   Pulse 71   Temp 98.5 °F (36.9 °C) (Oral)   Resp 16   Ht 172 cm (67.72\")   Wt 79.6 kg (175 lb 8 oz)   SpO2 98%   BMI 26.91 kg/m²     Physical Exam:   GENERAL: drowsy, cooperative  HEART: Regular rate and rhythm; no murmurs, rubs or gallops  LUNGS: breath sounds diminished at bases with some fine rales. Mildly labored breathing on 4 L/min O2 via NC  ABDOMEN: Soft, nontender   EXTREMITIES: Trace bilateral pedal/pretibial edema    Results:  Results from last 7 days   Lab Units 04/25/23  0305 04/24/23  0501 04/19/23  0428   WBC 10*3/mm3 7.25 8.45 7.25   HEMOGLOBIN g/dL 10.6* 10.8* 10.6*   HEMATOCRIT % 37.1 37.9 34.9   PLATELETS 10*3/mm3 234 233 200     Results from last 7 days   Lab Units 04/25/23  0305 04/24/23  0501 04/22/23  0313   SODIUM mmol/L 151* 146* 150*   POTASSIUM mmol/L 4.2 4.4 3.9   CHLORIDE mmol/L 107 104 105   CO2 mmol/L 35.0* 37.0* 34.0*   BUN mg/dL 69* 71* 67*   CREATININE mg/dL 1.74* 1.82* 1.36*   GLUCOSE mg/dL 58* 225* 134*      Lab Results   Component Value Date    CHOL 101 10/25/2022    TRIG 102 10/25/2022    HDL 37 (L) 10/25/2022    LDL 45 10/25/2022    AST 21 04/20/2023    ALT 11 04/20/2023                     Results from last 7 days   Lab Units 04/19/23  0840   PROTIME Seconds 18.9*   INR  1.58*     Results from last 7 days   Lab Units 04/24/23  0501   CK TOTAL U/L 44     Results from last 7 days   Lab Units 04/25/23  0305   PROBNP pg/mL 39,505.0*         Intake/Output Summary (Last 24 hours) at 4/25/2023 1234  Last data " filed at 4/24/2023 2000  Gross per 24 hour   Intake 60 ml   Output 300 ml   Net -240 ml       I personally reviewed the patient's EKG/Telemetry data    Radiology Data:   CXR 4/25/23:  Impression:  Extensive and worsening bibasilar atelectasis and effusion, and persistent pulmonary vascular congestion.    Current Medications:  apixaban, 2.5 mg, Oral, Q12H  atorvastatin, 40 mg, Oral, Nightly  bumetanide, 2 mg, Intravenous, BID  clopidogrel, 75 mg, Oral, Daily  famotidine, 20 mg, Oral, Daily  insulin lispro, 0-7 Units, Subcutaneous, TID With Meals  losartan, 50 mg, Oral, Q24H  metoprolol succinate XL, 25 mg, Oral, Q24H  minocycline, 100 mg, Oral, Q12H  nystatin, 5 mL, Swish & Swallow, 4x Daily  nystatin, , Topical, Q12H  polyethylene glycol, 17 g, Oral, Daily  senna-docusate sodium, 2 tablet, Oral, BID  sertraline, 50 mg, Oral, Daily      Pharmacy Consult - Pharmacy to dose,       ASSESSMENT  1. Cardiomyopathy/acute on chronic HFrEF  a. Echo 4/19/23: EF 36-40%, grade I LV diastolic dysfunction, trace to mild MR, L pleural effusion  b. 4/25/23 proBNP 39,500  c. 4/24/2023 CXR: Extensive and worsening bibasilar atelectasis and effusion  2. Bilateral pleural effusions   a. s/p thoracentesis 1.35 L pleural fluid 4/19/2023  3. Nonobstructive CAD  a. C 6/2021 mild to moderate nonobstructive multivessel CAD w/o any critical disease, EF 40-45%  4. Paroxysmal atrial fibrillation,   a. on Eliquis  b. Adequately rate-controlled on Toprol-XL  5. PEA Arrest 1/2023  6. Hypertension  7. Dyslipidemia, controlled on atorvastatin  8. CKD stage III  a. Creatinine 1.74  9. Type 2 diabetes  a. 3/2023 A1c 8.3  10. Extensive PAD with Bilateral Heal Ulcers/Cellulitis    PLAN  · Patient volume-overloaded with significant pulmonary edema and interval increase in pleural effusions. Agree with aggressive IV diuresis. Patient received 1 mg PO bumetanide this morning, to receive 2 mg IV bumetanide q12 starting tonight. Monitor renal function and  electrolytes tomorrow AM. If Creatinine increases with diuresis, might need to consider nephrology consult to guide diuresis. Strict I/Os and daily weights  · Blood pressure has consistently been >140 systolic since yesterday morning. Continue losartan and increase metoprolol succinate to 50 mg.  · Continue Lipitor for dyslipidemia and PAD. Continue Plavix for CAD and PAD.  · Management of multiple other medical conditions per hospitalist.    Benjamin Mccrary PA-C

## 2023-04-25 NOTE — PROGRESS NOTES
CTS Progress Note      Chief Complaint: Bilateral nonhealing ulcerations      Subjective  No acute events overnight.  No complaints this morning.    Objective    Physical Exam:   Vital Signs   Temp:  [96.6 °F (35.9 °C)-97.7 °F (36.5 °C)] 97.6 °F (36.4 °C)  Heart Rate:  [61-78] 64  Resp:  [16-18] 18  BP: (141-172)/(69-98) 172/87   GEN: NAD   CV: Regular rate and rhythm    RESP: Decreased to bases but clear to auscultation bilaterally   ABD: Soft nontender positive bowel    EXT: Warm to the touch no significant edema.  Feet are warm but no palpable pulses      Results     Results from last 7 days   Lab Units 04/25/23  0305   WBC 10*3/mm3 7.25   HEMOGLOBIN g/dL 10.6*   HEMATOCRIT % 37.1   PLATELETS 10*3/mm3 234     Results from last 7 days   Lab Units 04/25/23  0305   SODIUM mmol/L 151*   POTASSIUM mmol/L 4.2   CHLORIDE mmol/L 107   CO2 mmol/L 35.0*   BUN mg/dL 69*   CREATININE mg/dL 1.74*   GLUCOSE mg/dL 58*   CALCIUM mg/dL 8.2*     Results from last 7 days   Lab Units 04/19/23  0840   INR  1.58*     ABIs:  Right Conclusion: The right HOMER is unable to be assessed due to vessel incompressibility. Unable to assess digital ischemia.  Waveforms are difficult to assess due to A-fib and PVCs.  Reported palpable pulses.  •  Left Conclusion: The left HOMER is unable to be assessed due to vessel incompressibility.  Possible severe digital ischemia. Waveforms are difficult to assess due to A-fib and PVCs.  Reported palpable pulses.  •  Findings overall are inconclusive.  Alternative evaluation methods such as an arterial duplex ultrasound or CT angiogram with runoff could be considered if clinically warranted.    Assessment & Plan   Peripheral arterial disease with a history of left SFA angioplasty and stent on 9/28/2022.   Nonhealing bilateral heel ulcers      Plan   -HOMER results noted above, unable to obtain due to vessel and compressibility  -Continue wound care and IV/oral antibiotic therapy per infectious disease and wound  care team  -Currently no plans from a vascular standpoint for surgical intervention  -Follow-up with vascular surgery 1 to 2 weeks following discharge    Sobia Padilla, APRN  04/25/23  07:59 EDT      --    I reviewed this documentation. I interviewed and examined this patient this morning. Plan as documented.     Billy Colvin M.D., R.P.V.I.  Cardiothoracic and Vascular Surgeon  [facility]

## 2023-04-25 NOTE — PLAN OF CARE
Goal Outcome Evaluation:  Plan of Care Reviewed With: patient, daughter        Progress: improving  Outcome Evaluation: Pt able to inrease activity this date, performed SPT from EOB > recliner, then practiced sit <> stand x3 reps. Limited standing adryan due to weakness. VSS. Continue POC.

## 2023-04-25 NOTE — PLAN OF CARE
Goal Outcome Evaluation:  Plan of Care Reviewed With: patient, daughter     Problem: Adult Inpatient Plan of Care  Goal: Plan of Care Review  Outcome: Ongoing, Progressing  Flowsheets (Taken 4/25/2023 1606)  Progress: no change  Plan of Care Reviewed With:   patient   daughter  Outcome Evaluation: Pt c/o increased SOA today and continues on 3.5 L O2/NC. up to chair today-tolerated fair, denies pain. more alert today. daughter at bedside. strict I/Os-incontinence. BM today and purewick in use. will monitor.        Progress: no change  Outcome Evaluation: Pt c/o increased SOA today and continues on 3.5 L O2/NC. up to chair today-tolerated fair, denies pain. more alert today. daughter at bedside. strict I/Os-incontinence. BM today and purewick in use. will monitor.

## 2023-04-25 NOTE — CASE MANAGEMENT/SOCIAL WORK
Continued Stay Note  [facility]     Patient Name: Susan Anderson  MRN: [id number]  Today's Date: 4/25/2023    Admit Date: 4/17/2023        Discharge Plan     Row Name 04/25/23 1454       Plan    Plan Comments [facility] at [facility] has accepted Mrs. Anderson and there is an ambulance at 1:00 pm on Thursday if she is able to be transported that day and if she is not medically ready the ambulance can be resheduled for a later date.               Discharge Codes    No documentation.               Expected Discharge Date and Time     Expected Discharge Date Expected Discharge Time    Apr 27, 2023             AMELIA Andres

## 2023-04-25 NOTE — PROGRESS NOTES
[facility] Medicine Services  PROGRESS NOTE    Patient Name: Susan Anderson  : 1940  MRN: [id number]    Date of Admission: 2023  Primary Care Physician: Arleen Doherty MD    Subjective   Subjective     CC:  CHF exacerbation    HPI:  Worsened dyspnea  Increased WOB on exam as well and on n/c    Objective   Objective     Vital Signs:   Temp:  [96.6 °F (35.9 °C)-98.5 °F (36.9 °C)] 98.5 °F (36.9 °C)  Heart Rate:  [61-71] 71  Resp:  [16-18] 16  BP: (141-172)/(69-88) 154/74  Flow (L/min):  [3.5] 3.5     Physical Exam:  Constitutional: Frail appearing older female lying in bed  HENT: NCAT, mucous membranes moist  Respiratory: Crackles present bilaterally, respiratory effort increased w tachypnea on 2.5 liters n/c  Cardiovascular: RRR, no murmurs, rubs, or gallops  Gastrointestinal: Soft, nontender, nondistended  Musculoskeletal: Muscle tone decreased throughout, no joint effusions appreciated  Psychiatric: Appropriate affect, cooperative  Neurologic: Alert and oriented, facial movements symmetric and spontaneous movement of all 4 extremities grossly equal bilaterally, speech clear  Skin: No rashes    Results Reviewed: CXR personally reviewed and interpreted: worsening bilateral infiltrates c/w pulm edema + pleural effusions. proBNP much more elevated  LAB RESULTS:      Lab 23  0305 23  0501 23  0451 23  0840 23  0428   WBC 7.25 8.45  --   --  7.25   HEMOGLOBIN 10.6* 10.8*  --   --  10.6*   HEMATOCRIT 37.1 37.9  --   --  34.9   PLATELETS 234 233  --   --  200   .2* 103.6*  --   --  99.7*   LDH  --   --  313*  --   --    PROTIME  --   --   --  18.9*  --          Lab 23  0305 23  0501 23  0313 23  0451 23  0428   SODIUM 151* 146* 150* 148* 149*   POTASSIUM 4.2 4.4 3.9 3.8 4.3   CHLORIDE 107 104 105 106 104   CO2 35.0* 37.0* 34.0* 35.0* 30.0*   ANION GAP 9.0 5.0 11.0 7.0 15.0   BUN 69* 71* 67* 67* 71*   CREATININE  1.74* 1.82* 1.36* 1.33* 1.39*   EGFR 28.6* 27.1* 38.5* 39.5* 37.5*   GLUCOSE 58* 225* 134* 167* 216*   CALCIUM 8.2* 8.3* 8.6 7.8* 8.3*         Lab 04/20/23  0451 04/19/23  0428   TOTAL PROTEIN 4.4* 4.9*   ALBUMIN 2.3* 2.3*   GLOBULIN 2.1 2.6   ALT (SGPT) 11 13   AST (SGOT) 21 19   BILIRUBIN 0.8 0.8   ALK PHOS 139* 164*         Lab 04/25/23  0305 04/19/23  0840   PROBNP 39,505.0*  --    PROTIME  --  18.9*   INR  --  1.58*                 Brief Urine Lab Results  (Last result in the past 365 days)      Color   Clarity   Blood   Leuk Est   Nitrite   Protein   CREAT   Urine HCG        04/22/23 1044 Yellow   Cloudy   Small (1+)   Small (1+)   Negative   >=300 mg/dL (3+)                 Microbiology Results Abnormal     Procedure Component Value - Date/Time    Anaerobic Culture - Pleural Fluid, Pleural Cavity [[id number]]  (Normal) Collected: 04/19/23 1220    Lab Status: Final result Specimen: Pleural Fluid from Pleural Cavity Updated: 04/24/23 0648     Anaerobic Culture No anaerobes isolated at 5 days    Blood Culture - Blood, Arm, Right [[id number]]  (Normal) Collected: 04/17/23 1553    Lab Status: Final result Specimen: Blood from Arm, Right Updated: 04/22/23 1700     Blood Culture No growth at 5 days    Blood Culture - Blood, Arm, Left [[id number]]  (Normal) Collected: 04/17/23 1600    Lab Status: Final result Specimen: Blood from Arm, Left Updated: 04/22/23 1700     Blood Culture No growth at 5 days    Body Fluid Culture - Body Fluid, Pleural Cavity [[id number]] Collected: 04/19/23 1220    Lab Status: Final result Specimen: Body Fluid from Pleural Cavity Updated: 04/22/23 0819     Body Fluid Culture No growth at 3 days     Gram Stain Moderate (3+) WBCs seen      No organisms seen          XR Chest 1 View    Result Date: 4/25/2023  XR CHEST 1 VW Date of Exam: 4/25/2023 5:35 AM EDT Indication: DYSPNEA, ON EXERTION dyspnea Comparison: 4/20/2023 Findings: There is gradually worsening opacity of both lower and mid lungs,  apparently increasing pleural effusion and atelectasis, now with only the upper lungs remaining well aerated. Heart shadow is partly obscured but appears enlarged. There appears to be persistent perihilar edema.     Impression: Impression: Extensive and worsening bibasilar atelectasis and effusion, and persistent pulmonary vascular congestion. Electronically Signed: Breezy Rey  4/25/2023 8:59 AM EDT  Workstation ID: [id number]      Results for orders placed during the hospital encounter of 04/17/23    Adult Transthoracic Echo Complete W/ Cont if Necessary Per Protocol    Interpretation Summary  •  Left ventricular systolic function is moderately decreased. Left ventricular ejection fraction appears to be 36 - 40%.  •  Left ventricular diastolic function is consistent with (grade I) impaired relaxation.  •  Trace to mild mitral vegetation.  •  A left sided pleural effusion is noted.      Current medications:  Scheduled Meds:apixaban, 2.5 mg, Oral, Q12H  atorvastatin, 40 mg, Oral, Nightly  bumetanide, 2 mg, Intravenous, BID  clopidogrel, 75 mg, Oral, Daily  famotidine, 20 mg, Oral, Daily  insulin lispro, 0-7 Units, Subcutaneous, TID With Meals  losartan, 50 mg, Oral, Q24H  metoprolol succinate XL, 25 mg, Oral, Q24H  minocycline, 100 mg, Oral, Q12H  nystatin, 5 mL, Swish & Swallow, 4x Daily  nystatin, , Topical, Q12H  polyethylene glycol, 17 g, Oral, Daily  senna-docusate sodium, 2 tablet, Oral, BID  sertraline, 50 mg, Oral, Daily      Continuous Infusions:Pharmacy Consult - Pharmacy to dose,       PRN Meds:.•  senna-docusate sodium **AND** polyethylene glycol **AND** bisacodyl **AND** bisacodyl  •  nitroglycerin  •  ondansetron  •  Pharmacy Consult - Pharmacy to dose  •  prochlorperazine **OR** prochlorperazine **OR** prochlorperazine  •  sodium chloride  •  traMADol    Assessment & Plan   Assessment & Plan     Active Hospital Problems    Diagnosis  POA   • **Cellulitis of right foot due to methicillin-resistant  Staphylococcus aureus [L03.115, B95.62]  Yes   • Acute respiratory failure with hypoxia and hypercarbia [J96.01, J96.02]  Unknown   • T2DM  [E11.9]  Yes   • Paroxysmal atrial fibrillation [I48.0]  Yes   • Chronic combined systolic and diastolic heart failure  [I50.42]  Yes   • NICM (nonischemic cardiomyopathy) ([facility]) [I42.8]  Yes   • Dyslipidemia [E78.5]  Yes   • Mitral valve disease [I05.9]  Yes   • Acute on chronic systolic CHF (congestive heart failure) [I50.23]  Yes   • Diabetic foot ulcer [E11.621, L97.509]  Yes   • PVD (peripheral vascular disease) ([facility]) [I73.9]  Yes   • Diabetes mellitus with neuropathy [E11.40]  Yes   • Stage 3a chronic kidney disease [N18.31]  Yes   • Essential hypertension [I10]  Yes      Resolved Hospital Problems   No resolved problems to display.        Brief Hospital Course to date:  Susan Anderson is a 83 y.o. female w multiple readmissions for severe CHFpEF, PEA arrest 1/2023, CKDV w h/o LACEY-D who presented again with increased dyspnea and fatigue. On 4/25, worsened pulmonary edema on CXR and more elevated proBNP    Acute on chronic CHFpEF c/b pleural effusions - worse 4/25  Acute on chronic hypoxic resp failure 2/2 above  -increase to IV bumex BID, BMP in am for monitoring  -d/w cardiology who will see again tmrw, d/w nursing for accurate I&Os x 24 hours  -clarifying home O2 script, per other notes on 1 liter n/c. Now requiring 3 liter w increased WOB  -overall, her CHF is very severe bears a poor prognosis given frequent readmissions    CKDIV - complicated diuresis, will need close monitoring. On ARB, low threshold to hold    Chronic bilateral heel wounds - minocycline per ID, wound care and vasc surgery clinic f/u    PUD - pepcid  Paroxysmal Afib - eliquis, BB  Weight loss - 25 kg weight loss in last 6 months on review. C/w poor prognosis    Overall, worry about patient's escalating CHF course    Expected Discharge Location and Transportation: [place]  Expected Discharge 4/27  (Discharge date is tentative pending patient's medical condition and is subject to change)  Expected Discharge Date: 04/27/23       DVT prophylaxis:  Medical DVT prophylaxis orders are present.     AM-PAC 6 Clicks Score (PT): 10 (04/25/23 1012)    CODE STATUS:   Code Status and Medical Interventions:   Ordered at: 04/17/23 1800     Code Status (Patient has no pulse and is not breathing):    No CPR (Do Not Attempt to Resuscitate)     Medical Interventions (Patient has pulse or is breathing):    Full       Macy Hall MD  04/25/23    Level 3 note:  1) Problem  Acute or chronic illnesses or injuries that may pose a threat to life or bodily function: worsened acute on chronic CHFpEF    2) Data     Test reviewed:  Tests independently interpreted, see above read: CXR    Outside group discussion: d/w Dr Pete bedside on 4/25    3) Risk  Drug therapy requiring intensive monitoring for toxicity: BMP w diuresis in high risk CKD patient

## 2023-04-25 NOTE — PROGRESS NOTES
INFECTIOUS DISEASE Progress Note    Susan Anderson  1940  [id number]    Admission Date: 4/17/2023      Requesting Provider: No ref. provider found  Evaluating Physician: Stef Pete MD    Reason for Consultation: Bilateral foot infections    History of present illness:    4/18/23: Patient is a 83 y.o. female With type 2 diabetes mellitus, stage III chronic kidney disease, coronary artery disease, systolic/diastolic congestive heart failure, Severe peripheral vascular disease, recurrent bilateral foot infections, recent bilateral heel decubitus ulcers, a recent MRSA right toe wound infection, and recent Covid 19 in late January 2023 who is seen today after she presented with dyspnea and fatigue, and malaise. I saw her recently in the office with bilateral heel decubitus ulcers and a right fourth toe ulcer with associated cellulitis.  I initially started her on Augmentin therapy but then switched to minocycline after a right fourth toe culture returned positive for MRSA. I followed her during an admission from 1/3 until 1/20/23 for bibasilar pneumonia, Klebsiella/E. Coli UTI, and PEA arrest with acute hypoxic respiratory failure.  She suffered from acute renal failure requiring dialysis but her dialysis has subsequently been discontinued.  She was readmitted from 1/28 to 2/2/23 for Covid 19 infection for which she received Decadron but was not able to receive remdesivir due to bradycardia.  She was readmitted last night with dyspnea and acute hypoxic respiratory failure.  She has required BiPAP and is currently on 30% FiO2 with an O2 saturation of 98%.  She is lethargic and unable to provide any history. Chest CT scan revealed moderate to large bilateral pleural effusions and bibasilar atelectasis with cardiomegaly.  She is undergoing diuresis.She has been started on intravenous daptomycin.  She has remained afebrile.Her pro-BNP yesterday was 19,401. Her creatinine was 1.63.Her white blood cell count  was 8.1.Her creatinine today is 1.4 and her white blood cell count is 6.4.     4/19/23:She remains afebrile.She is on 3 L of oxygen with an O2 saturation of 93%. X-ray today reveals right greater than left effusions/opacities.  She is scheduled to undergo right thoracentesis today.  She has decreased dyspnea.  She has some nausea but denies vomiting.    4/20/23:She remains afebrile.  Her creatinine is 1.33. Her O2 saturation is 94% on 3 L. She underwent ultrasound-guided thoracentesis of the right pleural effusion yesterday yielding 1.35 L of pleural fluid. She had increased dyspnea immediately after her thoracentesis but this has dramatically improved.  Her chest x-ray shortly after thoracentesis revealed marked improvement but then she had evidence of bilateral congestion consistent with pulmonary edema. She transiently received Zosyn yesterday due to concerns over possible aspiration pneumonia but this has been discontinued by Dr. Felder.    4/21/23:She remains afebrile.Pleural fluid culture from 4/19 no growth so far. Her MRSA right foot isolate from 4/12 was sensitive to tetracycline and Bactrim.  She is currently on 3 L of oxygen with an O2 saturation of 96-99%.  She has decreased dyspnea.  She denies cough and sputum production.    4/22/23: Afebrile.  Pleural fluid culture negative from 4/19.  She is currently on 3L O2 with % O2 sat.  Her feet feel better.  She has no worsening shortness of breath.  She denies cough, n/v/d, rashes.     4/23/23: Afebrile.  Reviewed pictures from 4/22.  She remained on 3L O2 with % O2 sat.  Urine cultures from 4/22 with 25K GNR and UA WBC 6-12 with small LE/negative nitrite.  The culture was ordered d/t worsening nausea.  Per family in room, she has been confused today.     4/24/23:  Her creatinine today is 1.8.  White blood cell count is 8.5.  Urinalysis from 4/22 revealed 6-12 white blood cells. Urine culture is growing 25,000 colonies of ESBL Klebsiella.  She is  currently sedated.  She cannot provide a reliable ROS at present.    4/25/23: The nursing staff indicate that she has remained lethargic on Klonopin therapy. She is on 3.5 L of oxygen with an O2 saturation of 92%. She has remained afebrile.Her creatinine today is 1.74.  White blood cell count is 7.3.  She was more alert when I saw her this morning.  She denies increased dyspnea.    Past Medical History:   Diagnosis Date   • Anemia 8/22   • Anxiety    • Arthritis    • Asthma 6/4 - double pneumonia    Currently on inhaler and nebulizer   • Atrial fibrillation 08/21/2022   • Brain tumor     Benign and followed at    • Cancer     cervical cancer, skin cancer   • CHF (congestive heart failure) 06/04/2021   • Chronic kidney disease Related to diabetes   • Clotting disorder 10/22    Upper GI bleed from blood thinners aggravate ulcers   • Coronary artery disease 6/4/2021 DX for hear failure   • COVID-19 01/28/2023   • Depression 8/22   • Diabetes mellitus 30 years    Seeing Dr. Lam 1st time Aug 19   • Dizziness 07/27/2022   • Effusion of right knee 08/12/2022   • Fall 07/27/2022   • GERD (gastroesophageal reflux disease)    • Gout    • History of medical problems 8/22    AFIB   • History of staph infection 10/2021    right toe   • History of transfusion     no reactions, 1 unit, [facility]   • HL (hearing loss)     Acoustic neuroma right   • Hx of colonoscopy    • Hyperlipidemia Reference current labs x 2-3yrs approx   • Hypertension 30 years   • Hypomagnesemia 10/06/2022   • Insomnia    • Low back pain    • Migraine    • Mitral valve disease    • Mitral valve disease    • Osteomyelitis    • Peptic ulceration 10/22    Secondary to blood thinners   • Peripheral neuropathy    • Physical deconditioning 05/17/2022   • Pneumonia due to infectious organism 06/04/2021   • Pressure injury of skin of sacral region 08/21/2022   • Renal insufficiency 2021   • Sepsis 01/28/2023   • Sleep apnea    • Syncope, unspecified syncope type  10/06/2022   • Type 2 diabetes mellitus     30 years   • Weakness 07/27/2022       Past Surgical History:   Procedure Laterality Date   • ABDOMINAL HYSTERECTOMY W/SALPINGECTOMY     • AMPUTATION  Right great toe, 1st 1/3 metatarsal -Reg 4/14/21   • AORTAGRAM N/A 04/09/2021    Procedure: AORTAGRAM WITH OR WITHOUT RUNOFFS, WITH Co2;  Surgeon: Trey Forrest MD;  Location: [email or web address];  Service: Vascular;  Laterality: N/A;   • AORTAGRAM N/A 09/28/2022    Procedure: CO2 ANGIOGRAM, LEFT SFA ANGIOPLASTY WITH DRUG ELUTING BALLOON, LEFT SFA STENT PLACEMENT ;  Surgeon: Trey Forrest MD;  Location: [email or web address];  Service: Vascular;  Laterality: N/A;  FLUORO: 13.12  DOSE 162mGy  CONTRAST: Isovue 350: 15ml   • APPENDECTOMY     • BRAIN TUMOR EXCISION      laser surgery    • CARDIAC CATHETERIZATION N/A 06/21/2021    Procedure: LEFT HEART CATH;  Surgeon: Anjum Ceballos MD;  Location: [email or web address]ATH INVASIVE LOCATION;  Service: Cardiology;  Laterality: N/A;   • CATARACT EXTRACTION W/ INTRAOCULAR LENS  IMPLANT, BILATERAL     • CHOLECYSTECTOMY     • COLONOSCOPY     • ENDOSCOPY N/A 10/07/2022    Procedure: ESOPHAGOGASTRODUODENOSCOPY;  Surgeon: Stef Veras MD;  Location: [email or web address];  Service: Gastroenterology;  Laterality: N/A;   • EYE SURGERY     • FEMORAL ARTERY STENT  10/2022   • HYSTERECTOMY     • INTERVENTIONAL RADIOLOGY PROCEDURE N/A 1/15/2023    Procedure: CV INTERVENTIONAL RADIOLOGY PROCEDURE;  Surgeon: Sanket Zapata MD;  Location: [email or web address] CATH INVASIVE LOCATION;  Service: Interventional Radiology;  Laterality: N/A;   • TOE SURGERY     • TRANS METATARSAL AMPUTATION Right 04/14/2021    Procedure: AMPUTATION TRANS METATARSAL RIGHT GREAT TOE;  Surgeon: Valdez Finney MD;  Location: [email or web address] OR;  Service: Vascular;  Laterality: Right;       Family History   Problem Relation Age of Onset   • Diabetes Mother         Type II   • Heart disease Father    • Heart attack Father    • Coronary artery disease Father     • Diabetes Father         Type II   • Diabetes Sister    • Diabetes Maternal Grandmother    • Diabetes Maternal Grandfather    • Diabetes Paternal Grandmother    • Diabetes Paternal Grandfather        Social History     Socioeconomic History   • Marital status:    • Number of children: 1   Tobacco Use   • Smoking status: Never   • Smokeless tobacco: Never   Vaping Use   • Vaping Use: Never used   Substance and Sexual Activity   • Alcohol use: Not Currently     Comment: Social drinking when not recovering from hospitalization   • Drug use: Never   • Sexual activity: Not Currently     Birth control/protection: None       Allergies   Allergen Reactions   • Vancomycin Other (See Comments)     Acute Kidney Injury, requested by ID   • Baclofen Other (See Comments) and Hallucinations     PSYCHOSIS-POA REFUSES ADMINISTRATION OF THIS MED.   • Cephalexin Nausea Only   • Erythromycin Base Nausea Only   • Melatonin Other (See Comments)     Nightmares   • Oxycodone Nausea Only   • Protonix [Pantoprazole] Itching and Rash   • Sulfa Antibiotics Nausea Only         Medication:    Current Facility-Administered Medications:   •  apixaban (ELIQUIS) tablet 2.5 mg, 2.5 mg, Oral, Q12H, Breezy Felder MD, 2.5 mg at 04/24/23 2024  •  atorvastatin (LIPITOR) tablet 40 mg, 40 mg, Oral, Nightly, Faiza Angulo MD, 40 mg at 04/24/23 2025  •  sennosides-docusate (PERICOLACE) 8.6-50 MG per tablet 2 tablet, 2 tablet, Oral, BID, 2 tablet at 04/24/23 0941 **AND** polyethylene glycol (MIRALAX) packet 17 g, 17 g, Oral, Daily PRN **AND** bisacodyl (DULCOLAX) EC tablet 5 mg, 5 mg, Oral, Daily PRN **AND** bisacodyl (DULCOLAX) suppository 10 mg, 10 mg, Rectal, Daily PRN, Faiza Angulo MD  •  bumetanide (BUMEX) tablet 1 mg, 1 mg, Oral, Daily, Breezy Felder MD, 1 mg at 04/24/23 0942  •  clopidogrel (PLAVIX) tablet 75 mg, 75 mg, Oral, Daily, Faiza Angulo MD, 75 mg at 04/24/23 0942  •  famotidine (PEPCID) tablet 20 mg, 20 mg, Oral,  Daily, Breezy Felder MD, 20 mg at 04/24/23 0943  •  insulin detemir (LEVEMIR) injection 15 Units, 15 Units, Subcutaneous, Daily, Breezy Felder MD, 15 Units at 04/24/23 0928  •  Insulin Lispro (humaLOG) injection 0-7 Units, 0-7 Units, Subcutaneous, TID With Meals, Faiza Angulo MD, 2 Units at 04/24/23 1746  •  losartan (COZAAR) tablet 50 mg, 50 mg, Oral, Q24H, Anjum Ceballos MD, 50 mg at 04/24/23 0942  •  metoprolol succinate XL (TOPROL-XL) 24 hr tablet 25 mg, 25 mg, Oral, Q24H, Anjum Ceballos MD, 25 mg at 04/24/23 0941  •  minocycline (MINOCIN,DYNACIN) capsule 100 mg, 100 mg, Oral, Q12H, Stef Pete MD, 100 mg at 04/24/23 2230  •  nitroglycerin (NITROSTAT) ointment 0.5 inch, 0.5 inch, Topical, Q6H PRN, Faiza Angulo MD  •  nystatin (MYCOSTATIN) 100,000 unit/mL suspension 500,000 Units, 5 mL, Swish & Swallow, 4x Daily, Breezy Felder MD, 500,000 Units at 04/24/23 2025  •  nystatin (MYCOSTATIN) powder, , Topical, Q12H, Breezy Felder MD, Given at 04/24/23 2025  •  ondansetron (ZOFRAN) injection 4 mg, 4 mg, Intravenous, Q6H PRN, Breezy Felder MD, 4 mg at 04/22/23 0602  •  Pharmacy Consult - Pharmacy to dose, , Does not apply, Continuous PRN, Faiza Angulo MD  •  polyethylene glycol (MIRALAX) packet 17 g, 17 g, Oral, Daily, Faiza Angulo MD, 17 g at 04/24/23 0945  •  prochlorperazine (COMPAZINE) injection 5 mg, 5 mg, Intravenous, Q6H PRN, 5 mg at 04/22/23 0915 **OR** prochlorperazine (COMPAZINE) tablet 5 mg, 5 mg, Oral, Q6H PRN **OR** prochlorperazine (COMPAZINE) suppository 25 mg, 25 mg, Rectal, Q12H PRN, Breezy Felder MD  •  sertraline (ZOLOFT) tablet 50 mg, 50 mg, Oral, Daily, Faiza Angulo MD, 50 mg at 04/24/23 0942  •  sodium chloride 0.9 % flush 10 mL, 10 mL, Intravenous, PRN, Faiza Angulo MD, 10 mL at 04/24/23 2025  •  traMADol (ULTRAM) tablet 25 mg, 25 mg, Oral, Q12H PRN, Faiza Angulo MD, 25 mg at 04/18/23 0937    Antibiotics:  Anti-Infectives (From admission,  onward)    Ordered     Dose/Rate Route Frequency Start Stop    23 1700  minocycline (MINOCIN,DYNACIN) capsule 100 mg        Ordering Provider: Stef Pete MD    100 mg Oral Every 12 Hours Scheduled 23 2100 23 20523 1545  piperacillin-tazobactam (ZOSYN) 3.375 g in iso-osmotic dextrose 50 ml (premix)        Ordering Provider: Breezy Felder MD    3.375 g  over 30 Minutes Intravenous Once 23 1645 23 1752    23 1542  DAPTOmycin (CUBICIN) 300 mg in sodium chloride 0.9 % 50 mL IVPB        Note to Pharmacy: MRSA w/ restriction from Vanco per ID.   Ordering Provider: Kings Herrera MD    300 mg  100 mL/hr over 30 Minutes Intravenous Once 23 1600 23 1801            Review of Systems:  See HPI    Physical Exam:   Vital Signs  Temp (24hrs), Av.2 °F (36.2 °C), Min:96.6 °F (35.9 °C), Max:97.7 °F (36.5 °C)    Temp  Min: 96.6 °F (35.9 °C)  Max: 97.7 °F (36.5 °C)  BP  Min: 133/56  Max: 168/89  Pulse  Min: 61  Max: 83  Resp  Min: 16  Max: 18  SpO2  Min: 88 %  Max: 100 %    GENERAL: More alert but severely debilitated appearing  HEENT: No labial ulcers    NECK: Supple   LYMPH: No cervical, axillary or inguinal lymphadenopathy.  HEART: RRR; No murmur, rubs, gallops.   LUNGS: .Decreased breath sounds at the right base with scattered rales.  ABDOMEN: Soft, nontender, nondistended.   EXT:  1+ bilateral lower extremity edema.  She has bilateral 1.5 cm heel pressure ulcers.  The lateral right fourth toe ulcer has only minimal surrounding erythema present.  MSK: No joint effusions or erythema  SKIN: No diffuse rash present.    NEURO: More alert and responsive today.  She moves all of her extremities.                    Laboratory Data    Results from last 7 days   Lab Units 23  0305 23  0501 23  0428   WBC 10*3/mm3 7.25 8.45 7.25   HEMOGLOBIN g/dL 10.6* 10.8* 10.6*   HEMATOCRIT % 37.1 37.9 34.9   PLATELETS 10*3/mm3 234 233 200     Results from  last 7 days   Lab Units 04/25/23  0305   SODIUM mmol/L 151*   POTASSIUM mmol/L 4.2   CHLORIDE mmol/L 107   CO2 mmol/L 35.0*   BUN mg/dL 69*   CREATININE mg/dL 1.74*   GLUCOSE mg/dL 58*   CALCIUM mg/dL 8.2*     Results from last 7 days   Lab Units 04/20/23  0451   ALK PHOS U/L 139*   BILIRUBIN mg/dL 0.8   ALT (SGPT) U/L 11   AST (SGOT) U/L 21                 Results from last 7 days   Lab Units 04/24/23  0501   CK TOTAL U/L 44         Estimated Creatinine Clearance: 26.5 mL/min (A) (by C-G formula based on SCr of 1.74 mg/dL (H)).      Microbiology:  Microbiology Results (last 10 days)     Procedure Component Value - Date/Time    Urine Culture - Urine, Urine, Clean Catch [[id number]]  (Abnormal)  (Susceptibility) Collected: 04/22/23 1044    Lab Status: Final result Specimen: Urine, Clean Catch Updated: 04/24/23 0729     Urine Culture 25,000 CFU/mL Klebsiella pneumoniae ESBL     Comment:   Consider infectious disease consult.  Susceptibility results may not correlate to clinical outcomes.       Narrative:      Colonization of the urinary tract without infection is common. Treatment is discouraged unless the patient is symptomatic, pregnant, or undergoing an invasive urologic procedure.  Recent outcomes data supports the use of pip/tazo in the treatment of susceptible ESBL infections for uncomplicated UTI. Consider use of pip/tazo as a carbapenem-sparing regimen in applicable patients.    Susceptibility      Klebsiella pneumoniae ESBL      NAIMA      Ertapenem Susceptible      Gentamicin Susceptible      Levofloxacin Intermediate      Meropenem Susceptible      Nitrofurantoin Intermediate      Piperacillin + Tazobactam Resistant     Trimethoprim + Sulfamethoxazole Susceptible                           Body Fluid Culture - Body Fluid, Pleural Cavity [[id number]] Collected: 04/19/23 1220    Lab Status: Final result Specimen: Body Fluid from Pleural Cavity Updated: 04/22/23 0819     Body Fluid Culture No growth at 3 days      Gram Stain Moderate (3+) WBCs seen      No organisms seen    Anaerobic Culture - Pleural Fluid, Pleural Cavity [[id number]]  (Normal) Collected: 04/19/23 1220    Lab Status: Final result Specimen: Pleural Fluid from Pleural Cavity Updated: 04/24/23 0648     Anaerobic Culture No anaerobes isolated at 5 days    Blood Culture - Blood, Arm, Left [[id number]]  (Normal) Collected: 04/17/23 1600    Lab Status: Final result Specimen: Blood from Arm, Left Updated: 04/22/23 1700     Blood Culture No growth at 5 days    Blood Culture - Blood, Arm, Right [[id number]]  (Normal) Collected: 04/17/23 1553    Lab Status: Final result Specimen: Blood from Arm, Right Updated: 04/22/23 1700     Blood Culture No growth at 5 days        Culture results from last 30 days   Lab 04/19/23  1220 04/12/23  0854   WOUNDCX  --  Moderate growth (3+) Staphylococcus aureus, MRSA*   ANACX No anaerobes isolated at 5 days  --            Radiology:  Imaging Results (Last 72 Hours)     Procedure Component Value Units Date/Time    XR Chest 1 View [[id number]] Resulted: 04/25/23 0612     Updated: 04/25/23 0613      I read her radiographic studies.      Impression:   1.  MRSA right foot wound infection-with cellulitis of the right fourth toe and bilateral decubitus heel ulcers.  This has occurred in the setting of severe peripheral vascular disease and severe debility.  These will be very difficult to heal.  She is S/P daptomycin therapy.  Her cellulitis has improved.  She is now on oral minocycline.  2.  Pulmonary edema  3.  Acute hypoxic respiratory failure-secondary to pulmonary edema  4.  Acute on chronic systolic/diastolic heart failure  5.  Paroxysmal atrial fibrillation  6.  History of paroxysmal atrial fibrillation  7.  Severe peripheral vascular disease  8.  Severe debility  9.  Type 2 diabetes mellitus-this places her at increased risk for recurrent infections  10.  Stage III chronic kidney disease  11.  Recent Covid 19 infection-1/28/23.  This has  contributed to her debility  12.  GNR urinary colonization.--NEW  13.  Encephalopathy--toxic vs metabolic vs other.     PLAN/RECOMMENDATIONS:   1.  Diuretic therapy per the hospitalist service  2.  Minocycline 100 mg p.o. twice daily x10 days  3.  Discharge soon to a subacute nursing facility    I discussed her complex situation with Dr. Hall today.  Her overall prognosis remains poor.       Stef Pete MD  4/25/2023  06:29 EDT

## 2023-04-25 NOTE — THERAPY TREATMENT NOTE
Patient Name: Susan Anderson  : 1940    MRN: [id number]                              Today's Date: 2023       Admit Date: 2023    Visit Dx:     ICD-10-CM ICD-9-CM   1. Cellulitis of right foot due to methicillin-resistant Staphylococcus aureus  L03.115 682.7    B95.62 041.12   2. Diabetic ulcer of heel associated with type 2 diabetes mellitus, unspecified laterality, unspecified ulcer stage  E11.621 250.80    L97.409 707.14   3. Stage 3b chronic kidney disease  N18.32 585.3   4. Diabetes mellitus type 2 in obese  E11.69 250.00    E66.9 278.00   5. Elevated troponin  R77.8 790.6   6. Elevated blood pressure reading with diagnosis of hypertension  I10 401.9     Patient Active Problem List   Diagnosis   • Diabetic foot ulcer   • PVD (peripheral vascular disease) ([facility])   • Osteomyelitis   • Diabetes mellitus with neuropathy   • Essential hypertension   • Stage 3a chronic kidney disease   • Acute on chronic systolic CHF (congestive heart failure)   • Mitral valve disease   • NICM (nonischemic cardiomyopathy) ([facility])   • CAD   • Dyslipidemia   • Chronic combined systolic and diastolic heart failure    • Lumbar stenosis with neurogenic claudication   • Spondylolisthesis, lumbar region   • Gait disturbance   • Anemia   • Sleep apnea   • Paroxysmal atrial fibrillation   • T2DM    • Iron deficiency anemia, unspecified   • Cellulitis of right foot due to methicillin-resistant Staphylococcus aureus   • Acute respiratory failure with hypoxia and hypercarbia     Past Medical History:   Diagnosis Date   • Anemia    • Anxiety    • Arthritis    • Asthma  - double pneumonia    Currently on inhaler and nebulizer   • Atrial fibrillation 2022   • Brain tumor     Benign and followed at    • Cancer     cervical cancer, skin cancer   • CHF (congestive heart failure) 2021   • Chronic kidney disease Related to diabetes   • Clotting disorder 10/22    Upper GI bleed from blood thinners aggravate ulcers   •  Coronary artery disease 6/4/2021 DX for hear failure   • COVID-19 01/28/2023   • Depression 8/22   • Diabetes mellitus 30 years    Seeing Dr. Lam 1st time Aug 19   • Dizziness 07/27/2022   • Effusion of right knee 08/12/2022   • Fall 07/27/2022   • GERD (gastroesophageal reflux disease)    • Gout    • History of medical problems 8/22    AFIB   • History of staph infection 10/2021    right toe   • History of transfusion     no reactions, 1 unit, [facility]   • HL (hearing loss)     Acoustic neuroma right   • Hx of colonoscopy    • Hyperlipidemia Reference current labs x 2-3yrs approx   • Hypertension 30 years   • Hypomagnesemia 10/06/2022   • Insomnia    • Low back pain    • Migraine    • Mitral valve disease    • Mitral valve disease    • Osteomyelitis    • Peptic ulceration 10/22    Secondary to blood thinners   • Peripheral neuropathy    • Physical deconditioning 05/17/2022   • Pneumonia due to infectious organism 06/04/2021   • Pressure injury of skin of sacral region 08/21/2022   • Renal insufficiency 2021   • Sepsis 01/28/2023   • Sleep apnea    • Syncope, unspecified syncope type 10/06/2022   • Type 2 diabetes mellitus     30 years   • Weakness 07/27/2022     Past Surgical History:   Procedure Laterality Date   • ABDOMINAL HYSTERECTOMY W/SALPINGECTOMY     • AMPUTATION  Right great toe, 1st 1/3 metatarsal -Reg 4/14/21   • AORTAGRAM N/A 04/09/2021    Procedure: AORTAGRAM WITH OR WITHOUT RUNOFFS, WITH Co2;  Surgeon: Trey Forrest MD;  Location: [facility];  Service: Vascular;  Laterality: N/A;   • AORTAGRAM N/A 09/28/2022    Procedure: CO2 ANGIOGRAM, LEFT SFA ANGIOPLASTY WITH DRUG ELUTING BALLOON, LEFT SFA STENT PLACEMENT ;  Surgeon: Trey Forrest MD;  Location: [facility];  Service: Vascular;  Laterality: N/A;  FLUORO: 13.12  DOSE 162mGy  CONTRAST: Isovue 350: 15ml   • APPENDECTOMY     • BRAIN TUMOR EXCISION      laser surgery    • CARDIAC CATHETERIZATION N/A 06/21/2021    Procedure: LEFT HEART  CATH;  Surgeon: Anjum Ceballos MD;  Location:  [facility] CATH INVASIVE LOCATION;  Service: Cardiology;  Laterality: N/A;   • CATARACT EXTRACTION W/ INTRAOCULAR LENS  IMPLANT, BILATERAL     • CHOLECYSTECTOMY     • COLONOSCOPY     • ENDOSCOPY N/A 10/07/2022    Procedure: ESOPHAGOGASTRODUODENOSCOPY;  Surgeon: Stef Veras MD;  Location:  [facility];  Service: Gastroenterology;  Laterality: N/A;   • EYE SURGERY     • FEMORAL ARTERY STENT  10/2022   • HYSTERECTOMY     • INTERVENTIONAL RADIOLOGY PROCEDURE N/A 1/15/2023    Procedure: CV INTERVENTIONAL RADIOLOGY PROCEDURE;  Surgeon: Sanket Zapata MD;  Location: [facility] CATH INVASIVE LOCATION;  Service: Interventional Radiology;  Laterality: N/A;   • TOE SURGERY     • TRANS METATARSAL AMPUTATION Right 04/14/2021    Procedure: AMPUTATION TRANS METATARSAL RIGHT GREAT TOE;  Surgeon: Valdez Finney MD;  Location:  [facility];  Service: Vascular;  Laterality: Right;      General Information     Row Name 04/25/23 0930          Physical Therapy Time and Intention    Document Type therapy note (daily note)  -SD     Mode of Treatment physical therapy  -SD     Row Name 04/25/23 0930          General Information    Existing Precautions/Restrictions fall;oxygen therapy device and L/min;other (see comments)  bilat heel wounds, decreased skin integrity  -SD     Row Name 04/25/23 0930          Cognition    Orientation Status (Cognition) oriented to;person  -SD     Row Name 04/25/23 0930          Safety Issues, Functional Mobility    Safety Issues Affecting Function (Mobility) friction/shear risk;insight into deficits/self-awareness;judgment;sequencing abilities;safety precaution awareness;problem-solving  -SD     Impairments Affecting Function (Mobility) balance;endurance/activity tolerance;motor planning;pain;postural/trunk control;shortness of breath;strength;cognition  -SD     Comment, Safety Issues/Impairments (Mobility) pt drowsy this date, difficulty keeping eyes open  -SD            User Key  (r) = Recorded By, (t) = Taken By, (c) = Cosigned By    Initials Name Provider Type    Johanny Holbrook PT Physical Therapist               Mobility     Row Name 04/25/23 1003          Bed Mobility    Bed Mobility supine-sit  -SD     Supine-Sit [place] (Bed Mobility) maximum assist (25% patient effort);2 person assist;verbal cues;nonverbal cues (demo/gesture)  -SD     Assistive Device (Bed Mobility) bed rails;draw sheet;head of bed elevated  -SD     Comment, (Bed Mobility) extra time and effort needed, pt c/o pain throughout all mobility  -SD     Row Name 04/25/23 1003          Transfers    Comment, (Transfers) SPT from EOB > recliner, then practiced sit <> stand x3 reps. Pt needed encouragement for max effort.  -SD     Row Name 04/25/23 1003          Bed-Chair Transfer    Bed-Chair [place] (Transfers) maximum assist (25% patient effort);2 person assist;nonverbal cues (demo/gesture);verbal cues  -SD     Row Name 04/25/23 1003          Sit-Stand Transfer    Sit-Stand [place] (Transfers) minimum assist (75% patient effort);2 person assist;verbal cues;nonverbal cues (demo/gesture)  -SD     Assistive Device (Sit-Stand Transfers) walker, front-wheeled  -SD     Row Name 04/25/23 1003          Gait/Stairs (Locomotion)    [place] Level (Gait) not tested  -SD           User Key  (r) = Recorded By, (t) = Taken By, (c) = Cosigned By    Initials Name Provider Type    Johanny Holbrook PT Physical Therapist               Obj/Interventions     Row Name 04/25/23 1007          Motor Skills    Therapeutic Exercise hip;knee;ankle  -SD     Row Name 04/25/23 1007          Hip (Therapeutic Exercise)    Hip (Therapeutic Exercise) AAROM (active assistive range of motion)  -SD     Hip AAROM (Therapeutic Exercise) flexion;bilateral;aBduction;aDduction;supine;10 repetitions  -SD     Row Name 04/25/23 1007          Knee (Therapeutic Exercise)    Knee (Therapeutic Exercise) AAROM (active  assistive range of motion)  -SD     Knee AAROM (Therapeutic Exercise) bilateral;flexion;extension;supine;10 repetitions  -SD     Row Name 04/25/23 1007          Ankle (Therapeutic Exercise)    Ankle (Therapeutic Exercise) AROM (active range of motion)  -SD     Ankle AROM (Therapeutic Exercise) bilateral;dorsiflexion;plantarflexion;supine;10 repetitions  -SD     Row Name 04/25/23 1007          Balance    Balance Assessment sitting static balance;standing static balance  -SD     Static Sitting Balance contact guard  -SD     Position, Sitting Balance supported;sitting edge of bed  -SD     Static Standing Balance minimal assist;2-person assist  -SD     Position/Device Used, Standing Balance supported;walker, rolling  -SD     Balance Interventions sit to stand  -SD           User Key  (r) = Recorded By, (t) = Taken By, (c) = Cosigned By    Initials Name Provider Type    Johanny Holbrook PT Physical Therapist               Goals/Plan    No documentation.                Clinical Impression     Row Name 04/25/23 1008          Pain    Pain Intervention(s) Medication (See MAR);Nursing Notified;Repositioned;Environmental changes  -SD     Row Name 04/25/23 1008          Pain Scale: FACES Pre/Post-Treatment    Pain: FACES Scale, Pretreatment 4-->hurts little more  -SD     Posttreatment Pain Rating 4-->hurts little more  -SD     Pain Location - Side/Orientation Bilateral  -SD     Pain Location - foot  -SD     Row Name 04/25/23 1008          Plan of Care Review    Plan of Care Reviewed With patient;daughter  -SD     Progress improving  -SD     Outcome Evaluation Pt able to inrease activity this date, performed SPT from EOB > recliner, then practiced sit <> stand x3 reps. Limited standing adryan due to weakness. VSS. Continue POC.  -SD     Row Name 04/25/23 1008          Vital Signs    Pre Systolic BP Rehab 159  -SD     Pre Treatment Diastolic BP 88  -SD     Post Systolic BP Rehab 169  -SD     Post Treatment Diastolic BP 77   -SD     Pretreatment Heart Rate (beats/min) 71  -SD     Posttreatment Heart Rate (beats/min) 71  -SD     Pre SpO2 (%) 100  -SD     O2 Delivery Pre Treatment nasal cannula  -SD     Post SpO2 (%) 95  -SD     O2 Delivery Post Treatment nasal cannula  -SD     Pre Patient Position Supine  -SD     Post Patient Position Sitting  -SD     Row Name 04/25/23 1008          Positioning and Restraints    Pre-Treatment Position in bed  -SD     Post Treatment Position chair  -SD     In Chair notified nsg;reclined;call light within reach;encouraged to call for assist;exit alarm on;with family/caregiver;waffle cushion;on mechanical lift sling;heels elevated;waffle boot/both  -SD           User Key  (r) = Recorded By, (t) = Taken By, (c) = Cosigned By    Initials Name Provider Type    Johanny Holbrook PT Physical Therapist               Outcome Measures     Row Name 04/25/23 1012          How much help from another person do you currently need...    Turning from your back to your side while in flat bed without using bedrails? 2  -SD     Moving from lying on back to sitting on the side of a flat bed without bedrails? 2  -SD     Moving to and from a bed to a chair (including a wheelchair)? 2  -SD     Standing up from a chair using your arms (e.g., wheelchair, bedside chair)? 2  -SD     Climbing 3-5 steps with a railing? 1  -SD     To walk in hospital room? 1  -SD     AM-PAC 6 Clicks Score (PT) 10  -SD     Highest level of mobility 4 --> Transferred to chair/commode  -SD     Row Name 04/25/23 1012          Functional Assessment    Outcome Measure Options AM-PAC 6 Clicks Basic Mobility (PT)  -SD           User Key  (r) = Recorded By, (t) = Taken By, (c) = Cosigned By    Initials Name Provider Type    Johanny Holbrook PT Physical Therapist                             Physical Therapy Education     Title: PT OT SLP Therapies (In Progress)     Topic: Physical Therapy (In Progress)     Point: Mobility training (In Progress)      Learning Progress Summary           Patient Acceptance, E,D, NR by SD at 4/25/2023 1012    Acceptance, E,TB, NR by HM at 4/19/2023 1159    Acceptance, E, NR by KR at 4/18/2023 0824   Family Acceptance, E,D, NR by SD at 4/25/2023 1012    Acceptance, E, VU by KR at 4/19/2023 0818                   Point: Home exercise program (In Progress)     Learning Progress Summary           Patient Acceptance, E,D, NR by SD at 4/25/2023 1012    Acceptance, E, NR by KR at 4/18/2023 0824   Family Acceptance, E,D, NR by SD at 4/25/2023 1012    Acceptance, E, VU by KR at 4/19/2023 0818                   Point: Body mechanics (In Progress)     Learning Progress Summary           Patient Acceptance, E,D, NR by SD at 4/25/2023 1012    Acceptance, E, NR by AG at 4/24/2023 0228    Acceptance, E,TB, NR by HM at 4/19/2023 1159    Acceptance, E, NR by KR at 4/18/2023 0824   Family Acceptance, E,D, NR by SD at 4/25/2023 1012    Acceptance, E, VU by KR at 4/19/2023 0818                   Point: Precautions (In Progress)     Learning Progress Summary           Patient Acceptance, E,D, NR by SD at 4/25/2023 1012    Acceptance, E,TB, NR by  at 4/19/2023 1159    Acceptance, E, NR by KR at 4/18/2023 0824   Family Acceptance, E,D, NR by SD at 4/25/2023 1012    Acceptance, E, VU by KR at 4/19/2023 0818                               User Key     Initials Effective Dates Name Provider Type Discipline    SD 03/13/23 -  Johanny Larios, PT Physical Therapist PT    HM 09/22/22 -  Anna White, PT Physical Therapist PT    KR 01/16/23 -  Cha Jefferson, RN Registered Nurse Nurse     03/10/23 -  Desiree Lyon, RN Registered Nurse Nurse              PT Recommendation and Plan     Plan of Care Reviewed With: patient, daughter  Progress: improving  Outcome Evaluation: Pt able to inrease activity this date, performed SPT from EOB > recliner, then practiced sit <> stand x3 reps. Limited standing adryan due to weakness. VSS. Continue POC.     Time  Calculation:    PT Charges     Row Name 04/25/23 1013             Time Calculation    Start Time 0930  -SD      PT Received On 04/25/23  -SD      PT Goal Re-Cert Due Date 04/29/23  -SD         Time Calculation- PT    Total Timed Code Minutes- PT 30 minute(s)  -SD         Timed Charges    75076 - PT Therapeutic Exercise Minutes 8  -SD      28727 - PT Therapeutic Activity Minutes 22  -SD         Total Minutes    Timed Charges Total Minutes 30  -SD       Total Minutes 30  -SD            User Key  (r) = Recorded By, (t) = Taken By, (c) = Cosigned By    Initials Name Provider Type    Johanny Holbrook, SAHIL Physical Therapist              Therapy Charges for Today     Code Description Service Date Service Provider Modifiers Qty    03000619557 HC PT THER PROC EA 15 MIN 4/25/2023 Johanny Larios, PT GP 1    48098441160 HC PT THERAPEUTIC ACT EA 15 MIN 4/25/2023 Johanny Larios, PT GP 1    78428827960 HC PT THER SUPP EA 15 MIN 4/25/2023 Johanny Larios, PT GP 3          PT G-Codes  Outcome Measure Options: AM-PAC 6 Clicks Basic Mobility (PT)  AM-PAC 6 Clicks Score (PT): 10  AM-PAC 6 Clicks Score (OT): 12  PT Discharge Summary  Anticipated Discharge Disposition (PT): skilled nursing facility    Johanny Larios PT  4/25/2023

## 2023-04-26 PROBLEM — E43 SEVERE MALNUTRITION: Status: ACTIVE | Noted: 2023-04-26

## 2023-04-26 LAB
ANION GAP SERPL CALCULATED.3IONS-SCNC: 14 MMOL/L (ref 5–15)
BUN SERPL-MCNC: 70 MG/DL (ref 8–23)
BUN/CREAT SERPL: 40.5 (ref 7–25)
CALCIUM SPEC-SCNC: 8.6 MG/DL (ref 8.6–10.5)
CHLORIDE SERPL-SCNC: 105 MMOL/L (ref 98–107)
CO2 SERPL-SCNC: 33 MMOL/L (ref 22–29)
CREAT SERPL-MCNC: 1.73 MG/DL (ref 0.57–1)
DEPRECATED RDW RBC AUTO: 62 FL (ref 37–54)
EGFRCR SERPLBLD CKD-EPI 2021: 28.8 ML/MIN/1.73
ERYTHROCYTE [DISTWIDTH] IN BLOOD BY AUTOMATED COUNT: 16.6 % (ref 12.3–15.4)
GLUCOSE BLDC GLUCOMTR-MCNC: 161 MG/DL (ref 70–130)
GLUCOSE BLDC GLUCOMTR-MCNC: 201 MG/DL (ref 70–130)
GLUCOSE BLDC GLUCOMTR-MCNC: 224 MG/DL (ref 70–130)
GLUCOSE SERPL-MCNC: 191 MG/DL (ref 65–99)
HCT VFR BLD AUTO: 39.6 % (ref 34–46.6)
HGB BLD-MCNC: 11.7 G/DL (ref 12–15.9)
MCH RBC QN AUTO: 30 PG (ref 26.6–33)
MCHC RBC AUTO-ENTMCNC: 29.5 G/DL (ref 31.5–35.7)
MCV RBC AUTO: 101.5 FL (ref 79–97)
PLATELET # BLD AUTO: 267 10*3/MM3 (ref 140–450)
PMV BLD AUTO: 10.6 FL (ref 6–12)
POTASSIUM SERPL-SCNC: 4.8 MMOL/L (ref 3.5–5.2)
RBC # BLD AUTO: 3.9 10*6/MM3 (ref 3.77–5.28)
SODIUM SERPL-SCNC: 152 MMOL/L (ref 136–145)
WBC NRBC COR # BLD: 7.51 10*3/MM3 (ref 3.4–10.8)

## 2023-04-26 PROCEDURE — 82962 GLUCOSE BLOOD TEST: CPT

## 2023-04-26 PROCEDURE — 99232 SBSQ HOSP IP/OBS MODERATE 35: CPT | Performed by: NURSE PRACTITIONER

## 2023-04-26 PROCEDURE — 97597 DBRDMT OPN WND 1ST 20 CM/<: CPT

## 2023-04-26 PROCEDURE — 80048 BASIC METABOLIC PNL TOTAL CA: CPT | Performed by: INTERNAL MEDICINE

## 2023-04-26 PROCEDURE — 99232 SBSQ HOSP IP/OBS MODERATE 35: CPT | Performed by: INTERNAL MEDICINE

## 2023-04-26 PROCEDURE — 25010000002 ONDANSETRON PER 1 MG: Performed by: HOSPITALIST

## 2023-04-26 PROCEDURE — 99231 SBSQ HOSP IP/OBS SF/LOW 25: CPT

## 2023-04-26 PROCEDURE — 85027 COMPLETE CBC AUTOMATED: CPT | Performed by: INTERNAL MEDICINE

## 2023-04-26 PROCEDURE — 63710000001 INSULIN LISPRO (HUMAN) PER 5 UNITS: Performed by: INTERNAL MEDICINE

## 2023-04-26 RX ADMIN — APIXABAN 2.5 MG: 2.5 TABLET, FILM COATED ORAL at 08:38

## 2023-04-26 RX ADMIN — INSULIN LISPRO 3 UNITS: 100 INJECTION, SOLUTION INTRAVENOUS; SUBCUTANEOUS at 12:40

## 2023-04-26 RX ADMIN — FAMOTIDINE 20 MG: 20 TABLET, FILM COATED ORAL at 08:39

## 2023-04-26 RX ADMIN — MINOCYCLINE HYDROCHLORIDE 100 MG: 50 CAPSULE ORAL at 20:52

## 2023-04-26 RX ADMIN — APIXABAN 2.5 MG: 2.5 TABLET, FILM COATED ORAL at 20:49

## 2023-04-26 RX ADMIN — ONDANSETRON 4 MG: 2 INJECTION INTRAMUSCULAR; INTRAVENOUS at 09:05

## 2023-04-26 RX ADMIN — SERTRALINE HYDROCHLORIDE 50 MG: 50 TABLET ORAL at 08:39

## 2023-04-26 RX ADMIN — CLOPIDOGREL BISULFATE 75 MG: 75 TABLET ORAL at 08:39

## 2023-04-26 RX ADMIN — SENNOSIDES AND DOCUSATE SODIUM 2 TABLET: 8.6; 5 TABLET ORAL at 20:49

## 2023-04-26 RX ADMIN — ATORVASTATIN CALCIUM 40 MG: 40 TABLET, FILM COATED ORAL at 20:49

## 2023-04-26 RX ADMIN — LOSARTAN POTASSIUM 50 MG: 50 TABLET, FILM COATED ORAL at 08:38

## 2023-04-26 RX ADMIN — EMPAGLIFLOZIN 10 MG: 10 TABLET, FILM COATED ORAL at 08:39

## 2023-04-26 RX ADMIN — SENNOSIDES AND DOCUSATE SODIUM 2 TABLET: 8.6; 5 TABLET ORAL at 08:39

## 2023-04-26 RX ADMIN — NYSTATIN 500000 UNITS: 100000 SUSPENSION ORAL at 20:49

## 2023-04-26 RX ADMIN — MINOCYCLINE HYDROCHLORIDE 100 MG: 50 CAPSULE ORAL at 08:45

## 2023-04-26 RX ADMIN — BUMETANIDE 2 MG: 0.25 INJECTION INTRAMUSCULAR; INTRAVENOUS at 08:46

## 2023-04-26 RX ADMIN — INSULIN LISPRO 3 UNITS: 100 INJECTION, SOLUTION INTRAVENOUS; SUBCUTANEOUS at 08:36

## 2023-04-26 RX ADMIN — NYSTATIN: 100000 POWDER TOPICAL at 20:50

## 2023-04-26 RX ADMIN — BUMETANIDE 2 MG: 0.25 INJECTION INTRAMUSCULAR; INTRAVENOUS at 20:49

## 2023-04-26 RX ADMIN — METOPROLOL SUCCINATE 25 MG: 25 TABLET, EXTENDED RELEASE ORAL at 08:38

## 2023-04-26 RX ADMIN — INSULIN LISPRO 2 UNITS: 100 INJECTION, SOLUTION INTRAVENOUS; SUBCUTANEOUS at 17:35

## 2023-04-26 NOTE — CONSULTS
"Palliative Care Initial Consult   Attending Physician: Macy Hall MD  Referring Provider: Dr. Macy Hall    Reason for Referral:  assistance with clarification of goals of care    Code Status:   Code Status and Medical Interventions:   Ordered at: 04/17/23 1800     Code Status (Patient has no pulse and is not breathing):    No CPR (Do Not Attempt to Resuscitate)     Medical Interventions (Patient has pulse or is breathing):    Full      Advanced Directives: Advance Directive Status: Patient has advance directive, copy in chart   Family/Support: Katie Anderson (dtr), Álvaro Clinton (relative)  Goals of Care: TBD.    HPI: Susan Anderson is a 83 y.o. female with PMH significant for HTN, HLD, CKD III , CAD, PAD s/p left SFA angioplasty and stent 9/28/22, HFrEF (ECHO 1/3/23 with LVEF 41-45%), PUD/GIB, PAfib, T2DM, known to Palliative Care from previous admission 1/3-1/20 after PEA arrest, COVID, and acute renal failure requiring dialysis. Patient following Dr. Finney and ID outpatient due to wounds. Patient presented to [facility] on 4/17 due to increasing redness of bilateral heel wounds and increasing lethargy. Work up revealed moderate pulmonary edema with large bilateral pleural effusions and bibasilar atelectasis, metabolic encephalopathy, acute on chronic HFrEF. Thoracentesis on 4/19 with 1.35L removed. Hospitalization complicated by CKD III, acute on chronic respiratory failure secondary to acute on chronic HFrEF, with significant pulmonary edema. CT surgery following with no plans for surgical intervention for PAD and nonhealing wounds, MRSA positive. UTI positive for ESBL Klebsiella on 4/24. Palliative Care consulted for [facility] in the context of complex medical decision making.   Patient sitting up on side of bed, unable to articulate her symptoms stating she is, \"miserable\". Patient appears restless, tripoding, increased respiratory rate. Daughter, Katie, at bedside who is able to discuss patient's symptoms and hospitalization. Per " previous hospitalization, patient does not tolerate Benzo's well.     ROS: Unable to complete full ROS secondary to patient's AMS.        Past Medical History:   Diagnosis Date   • Anemia 8/22   • Anxiety    • Arthritis    • Asthma 6/4 - double pneumonia    Currently on inhaler and nebulizer   • Atrial fibrillation 08/21/2022   • Brain tumor     Benign and followed at    • Cancer     cervical cancer, skin cancer   • CHF (congestive heart failure) 06/04/2021   • Chronic kidney disease Related to diabetes   • Clotting disorder 10/22    Upper GI bleed from blood thinners aggravate ulcers   • Coronary artery disease 6/4/2021 DX for hear failure   • COVID-19 01/28/2023   • Depression 8/22   • Diabetes mellitus 30 years    Seeing Dr. Lam 1st time Aug 19   • Dizziness 07/27/2022   • Effusion of right knee 08/12/2022   • Fall 07/27/2022   • GERD (gastroesophageal reflux disease)    • Gout    • History of medical problems 8/22    AFIB   • History of staph infection 10/2021    right toe   • History of transfusion     no reactions, 1 unit, [facility]   • HL (hearing loss)     Acoustic neuroma right   • Hx of colonoscopy    • Hyperlipidemia Reference current labs x 2-3yrs approx   • Hypertension 30 years   • Hypomagnesemia 10/06/2022   • Insomnia    • Low back pain    • Migraine    • Mitral valve disease    • Mitral valve disease    • Osteomyelitis    • Peptic ulceration 10/22    Secondary to blood thinners   • Peripheral neuropathy    • Physical deconditioning 05/17/2022   • Pneumonia due to infectious organism 06/04/2021   • Pressure injury of skin of sacral region 08/21/2022   • Renal insufficiency 2021   • Sepsis 01/28/2023   • Sleep apnea    • Syncope, unspecified syncope type 10/06/2022   • Type 2 diabetes mellitus     30 years   • Weakness 07/27/2022     Past Surgical History:   Procedure Laterality Date   • ABDOMINAL HYSTERECTOMY W/SALPINGECTOMY     • AMPUTATION  Right great toe, 1st 1/3 metatarsal -Reg 4/14/21   •  AORTAGRAM N/A 04/09/2021    Procedure: AORTAGRAM WITH OR WITHOUT RUNOFFS, WITH Co2;  Surgeon: Trey Forrest MD;  Location:  [facility];  Service: Vascular;  Laterality: N/A;   • AORTAGRAM N/A 09/28/2022    Procedure: CO2 ANGIOGRAM, LEFT SFA ANGIOPLASTY WITH DRUG ELUTING BALLOON, LEFT SFA STENT PLACEMENT ;  Surgeon: Trey Forrest MD;  Location:  [facility];  Service: Vascular;  Laterality: N/A;  FLUORO: 13.12  DOSE 162mGy  CONTRAST: Isovue 350: 15ml   • APPENDECTOMY     • BRAIN TUMOR EXCISION      laser surgery    • CARDIAC CATHETERIZATION N/A 06/21/2021    Procedure: LEFT HEART CATH;  Surgeon: Anjum Ceballos MD;  Location:  [facility];  Service: Cardiology;  Laterality: N/A;   • CATARACT EXTRACTION W/ INTRAOCULAR LENS  IMPLANT, BILATERAL     • CHOLECYSTECTOMY     • COLONOSCOPY     • ENDOSCOPY N/A 10/07/2022    Procedure: ESOPHAGOGASTRODUODENOSCOPY;  Surgeon: Stef Veras MD;  Location: [facility] beds at the [facility] ENDOSCOPY;  Service: Gastroenterology;  Laterality: N/A;   • EYE SURGERY     • FEMORAL ARTERY STENT  10/2022   • HYSTERECTOMY     • INTERVENTIONAL RADIOLOGY PROCEDURE N/A 1/15/2023    Procedure: CV INTERVENTIONAL RADIOLOGY PROCEDURE;  Surgeon: Sanket Zapata MD;  Location: [facility] 234 beds at the [facility] CATH INVASIVE LOCATION;  Service: Interventional Radiology;  Laterality: N/A;   • TOE SURGERY     • TRANS METATARSAL AMPUTATION Right 04/14/2021    Procedure: AMPUTATION TRANS METATARSAL RIGHT GREAT TOE;  Surgeon: Valdez Finney MD;  Location: [facility] 234 beds at the [facility] OR;  Service: Vascular;  Laterality: Right;     Social History     Socioeconomic History   • Marital status:    • Number of children: 1   Tobacco Use   • Smoking status: Never   • Smokeless tobacco: Never   Vaping Use   • Vaping Use: Never used   Substance and Sexual Activity   • Alcohol use: Not Currently     Comment: Social drinking when not recovering from hospitalization   • Drug use: Never   • Sexual activity: Not Currently     Birth control/protection:  "None     Family History   Problem Relation Age of Onset   • Diabetes Mother         Type II   • Heart disease Father    • Heart attack Father    • Coronary artery disease Father    • Diabetes Father         Type II   • Diabetes Sister    • Diabetes Maternal Grandmother    • Diabetes Maternal Grandfather    • Diabetes Paternal Grandmother    • Diabetes Paternal Grandfather        Allergies   Allergen Reactions   • Vancomycin Other (See Comments)     Acute Kidney Injury, requested by ID   • Baclofen Other (See Comments) and Hallucinations     PSYCHOSIS-POA REFUSES ADMINISTRATION OF THIS MED.   • Cephalexin Nausea Only   • Erythromycin Base Nausea Only   • Melatonin Other (See Comments)     Nightmares   • Oxycodone Nausea Only   • Protonix [Pantoprazole] Itching and Rash   • Sulfa Antibiotics Nausea Only       Current medication reviewed for route, type, dose and frequency and are current per MAR at time of dictation.    Palliative Performance Scale Score:  40%    /71 (BP Location: Right arm, Patient Position: Lying)   Pulse 69   Temp 97.5 °F (36.4 °C) (Oral)   Resp 16   Ht 172 cm (67.72\")   Wt 79.6 kg (175 lb 8 oz)   SpO2 96%   BMI 26.91 kg/m²     Intake/Output Summary (Last 24 hours) at 4/26/2023 1131  Last data filed at 4/26/2023 1040  Gross per 24 hour   Intake --   Output 800 ml   Net -800 ml       Physical Exam:    General Appearance:    Patient sitting on side of bed, awake, chronically ill appearing, cooperative, NAD   HEENT:    NC/AT, EOMI, anicteric, MMM, facial grimacing   Neck:   supple, trachea midline, no JVD   Lungs:     CTA bilat, diminished throughout all lung fields; WOB increased at rest; RR 24-26 on exam    Heart:    RRR, normal S1 and S2, no M/R/G   Abdomen:     Normal bowel sounds, soft, nontender, distended   G/U:   Deferred   MSK/Extremities:   Wasting, no edema, bruising to BUE, wounds to feet not visualized   Pulses:   Pulses palpable and equal bilaterally   Skin:   Warm, dry "   Neurologic:   Alert, oriented xself,  GARG   Psych:   Restless, frequent position changes, appropriate         Labs:   Results from last 7 days   Lab Units 04/26/23  0357   WBC 10*3/mm3 7.51   HEMOGLOBIN g/dL 11.7*   HEMATOCRIT % 39.6   PLATELETS 10*3/mm3 267     Results from last 7 days   Lab Units 04/26/23  0357   SODIUM mmol/L 152*   POTASSIUM mmol/L 4.8   CHLORIDE mmol/L 105   CO2 mmol/L 33.0*   BUN mg/dL 70*   CREATININE mg/dL 1.73*   GLUCOSE mg/dL 191*   CALCIUM mg/dL 8.6     Results from last 7 days   Lab Units 04/26/23  0357 04/22/23  0313 04/20/23  0451   SODIUM mmol/L 152*   < > 148*   POTASSIUM mmol/L 4.8   < > 3.8   CHLORIDE mmol/L 105   < > 106   CO2 mmol/L 33.0*   < > 35.0*   BUN mg/dL 70*   < > 67*   CREATININE mg/dL 1.73*   < > 1.33*   CALCIUM mg/dL 8.6   < > 7.8*   BILIRUBIN mg/dL  --   --  0.8   ALK PHOS U/L  --   --  139*   ALT (SGPT) U/L  --   --  11   AST (SGOT) U/L  --   --  21   GLUCOSE mg/dL 191*   < > 167*    < > = values in this interval not displayed.     Imaging Results (Last 72 Hours)     Procedure Component Value Units Date/Time    XR Chest 1 View [[id number]] Collected: 04/25/23 0857     Updated: 04/25/23 0902    Narrative:      XR CHEST 1 VW    Date of Exam: 4/25/2023 5:35 AM EDT    Indication: DYSPNEA, ON EXERTION  dyspnea    Comparison: 4/20/2023    Findings:  There is gradually worsening opacity of both lower and mid lungs, apparently increasing pleural effusion and atelectasis, now with only the upper lungs remaining well aerated. Heart shadow is partly obscured but appears enlarged. There appears to be   persistent perihilar edema.      Impression:      Impression:  Extensive and worsening bibasilar atelectasis and effusion, and persistent pulmonary vascular congestion.      Electronically Signed: Breezy Rey    4/25/2023 8:59 AM EDT    Workstation ID: [id number]                Diagnostics: Reviewed    A:   Cellulitis of right foot due to methicillin-resistant Staphylococcus aureus     Diabetic foot ulcer    PVD (peripheral vascular disease) ([facility])    Diabetes mellitus with neuropathy    Essential hypertension    Stage 3a chronic kidney disease    Acute on chronic systolic CHF (congestive heart failure)    Mitral valve disease    NICM (nonischemic cardiomyopathy) ([facility])    Dyslipidemia    Chronic combined systolic and diastolic heart failure     Paroxysmal atrial fibrillation    T2DM     Acute respiratory failure with hypoxia and hypercarbia    Severe Malnutrition ([facility])   83 y.o. female with cellulitis, acute respiratory failure, acute on chronic CHF, pleural effusions.     S/S:   1. Dyspnea -currently on 3.5LNC  -bumex IV for diuresis    2. Pain -generalized  -continue Tramadol 25mg PO [id number]    3. Debility -PT/OT following    4. Anxiety -recommend Atarax prn for anxiety  -do not recommend Benzo at this time due to previous reaction    5. GOC -DNR/Full -per review of chart  -continue Full Treatment  -following as patient at high risk for decline    P: Patient and family familiar to Palliative. Long discussion with family regarding patient's course since previous discharge. Patient with significant decline over the past two days. Goal for continued full treatment at this time. Palliative following as patient at high risk for decline and support for patient and family. All questions and concerns addressed.    Thank you for this consult and allowing us to participate in patient's plan of care. Palliative Care Team will continue to follow patient. Please do not hesitate to contact us regarding further symptom management or goals of care needs.  Time: 45 minutes spent reviewing medical and medication records, assessing and examining patient, discussing with family, answering questions, providing some guidance about a plan and documentation of care, and coordinating care with other healthcare members, with > 50% time spent face to face.         Farideh Mena, APRN  4/26/2023

## 2023-04-26 NOTE — PLAN OF CARE
Goal Outcome Evaluation:  Plan of Care Reviewed With: patient, daughter           Outcome Evaluation: B heels stable with moderate nonviable tissue covering. PT will cont with light debridement and dressing management to help decrease bioburden and improve healing potential.

## 2023-04-26 NOTE — PROGRESS NOTES
Malnutrition Severity Assessment    Patient Name:  Susan Anderson  YOB: 1940  MRN: [id number]  Admit Date:  4/17/2023    Patient meets criteria for : Severe Malnutrition (Pt meets criteria for severe acute malnutrtiion based on intake hx w wasting.)    Comments:      Malnutrition Severity Assessment  Malnutrition Type: Acute Disease or Injury - Related Malnutrition  Malnutrition Type (last 8 hours)     Malnutrition Severity Assessment     Row Name 04/25/23 2151       Malnutrition Severity Assessment    Malnutrition Type Acute Disease or Injury - Related Malnutrition    Row Name 04/25/23 2151       Insufficient Energy Intake     Insufficient Energy Intake Findings Severe    Insufficient Energy Intake  < or equal to 50% of est. energy requirement for > or equal to 5d)    Row Name 04/25/23 2151       Unintentional Weight Loss     Unintentional Weight Loss Findings --  unable to determine 2/2 contribution of fluid to wt.    Row Name 04/25/23 2151       Muscle Loss    Loss of Muscle Mass Findings Moderate    [place] Region --  mild    Clavicle Bone Region Moderate - some protrusion in females, visible in males    Acromion Bone Region Moderate - acromion may slightly protrude    Scapular Bone Region --  mild    Dorsal Hand Region Moderate - slight depression    Patellar Region Moderate - patella more prominent, less muscle definition around patella    Anterior Thigh Region Moderate - mild depression on inner thigh    Posterior Calf Region Moderate - some roundness, slight firmness    Row Name 04/25/23 2151       Fat Loss    Subcutaneous Fat Loss Findings Moderate    Orbital Region  Moderate -  somewhat hollowness, slightly dark circles    Upper Arm Region --  mild    Thoracic & Lumbar Region Moderate - ribs visible with mild depressions, iliac crest somewhat prominent    Row Name 04/25/23 2151       Criteria Met (Must meet criteria for severity in at least 2 of these categories: M Wasting, Fat Loss, Fluid,  Secondary Signs, Wt. Status, Intake)    Patient meets criteria for  Severe Malnutrition  Pt meets criteria for severe acute malnutrtiion based on intake hx w wasting.                Electronically signed by:  Mary Lujan RD  04/25/23 22:09 EDT

## 2023-04-26 NOTE — PROGRESS NOTES
Clinical Nutrition     Nutrition Support Assessment  Reason for Visit: Malnutrition Severity Assessment, LOS      Patient Name: Susan Anderson  YOB: 1940  MRN: [id number]  Date of Encounter: 04/25/23 21:56 EDT  Admission date: 4/17/2023    Comments: Pt meets criteria for severe acute malnutrtiion based on intake hx w wasting.   See MSA note    Nutrition Assessment   Admission Diagnosis:  Cellulitis of right foot due to methicillin-resistant Staphylococcus aureus [L03.115, B95.62]      Problem List:    Cellulitis of right foot due to methicillin-resistant Staphylococcus aureus    Diabetic foot ulcer    PVD (peripheral vascular disease) ([facility])    Diabetes mellitus with neuropathy    Essential hypertension    Stage 3a chronic kidney disease    Acute on chronic systolic CHF (congestive heart failure)    Mitral valve disease    NICM (nonischemic cardiomyopathy) ([facility])    Dyslipidemia    Chronic combined systolic and diastolic heart failure     Paroxysmal atrial fibrillation    T2DM     Acute respiratory failure with hypoxia and hypercarbia    Paraflu 3    PUD    PMH:   She  has a past medical history of Anemia (8/22), Anxiety, Arthritis, Asthma (6/4 - double pneumonia), Atrial fibrillation (08/21/2022), Brain tumor, Cancer, CHF (congestive heart failure) (06/04/2021), Chronic kidney disease (Related to diabetes), Clotting disorder (10/22), Coronary artery disease (6/4/2021 DX for hear failure), COVID-19 (01/28/2023), Depression (8/22), Diabetes mellitus (30 years), Dizziness (07/27/2022), Effusion of right knee (08/12/2022), Fall (07/27/2022), GERD (gastroesophageal reflux disease), Gout, History of medical problems (8/22), History of staph infection (10/2021), History of transfusion, HL (hearing loss), colonoscopy, Hyperlipidemia (Reference current labs x 2-3yrs approx), Hypertension (30 years), Hypomagnesemia (10/06/2022), Insomnia, Low back pain, Migraine, Mitral valve disease, Mitral  valve disease, Osteomyelitis, Peptic ulceration (10/22), Peripheral neuropathy, Physical deconditioning (05/17/2022), Pneumonia due to infectious organism (06/04/2021), Pressure injury of skin of sacral region (08/21/2022), Renal insufficiency (2021), Sepsis (01/28/2023), Sleep apnea, Syncope, unspecified syncope type (10/06/2022), Type 2 diabetes mellitus, and Weakness (07/27/2022).    PSH:  She  has a past surgical history that includes Cholecystectomy; Appendectomy; Abdominal Hysterectomy w/Salpingectomy; Cataract extraction w/ intraocular lens  implant, bilateral; Brain tumor excision; Hysterectomy; Aortagram (N/A, 04/09/2021); Foot amputation through metatarsal (Right, 04/14/2021); Cardiac catheterization (N/A, 06/21/2021); Amputation (Right great toe, 1st 1/3 metatarsal -Reg 4/14/21); Eye surgery; Toe Surgery; Aortagram (N/A, 09/28/2022); Esophagogastroduodenoscopy (N/A, 10/07/2022); Femoral artery stent (10/2022); Colonoscopy; and Interventional radiology procedure (N/A, 1/15/2023).      Applicable Nutrition Concerns:   Skin:bilat foot wounds  Oral:  GI:      Applicable Interval History:         Reported/Observed/Food/Nutrition Related History:     Family allow wts 165-175lbs. Pt allow 3 da PTA poor intake < 1/2 usual began. Family allow pt will take Premier suppl.       Labs    Labs Reviewed: Yes   Note pBNP  Results from last 7 days   Lab Units 04/25/23  0305 04/24/23  0501 04/22/23  0313 04/20/23  0451 04/19/23  0428   GLUCOSE mg/dL 58* 225* 134* 167* 216*   BUN mg/dL 69* 71* 67* 67* 71*   CREATININE mg/dL 1.74* 1.82* 1.36* 1.33* 1.39*   SODIUM mmol/L 151* 146* 150* 148* 149*   CHLORIDE mmol/L 107 104 105 106 104   POTASSIUM mmol/L 4.2 4.4 3.9 3.8 4.3   ALT (SGPT) U/L  --   --   --  11 13       Results from last 7 days   Lab Units 04/20/23  0451 04/19/23  0428   ALBUMIN g/dL 2.3* 2.3*       Results from last 7 days   Lab Units 04/25/23  1625 04/25/23  1055 04/25/23  0856 04/25/23  0805 04/25/23  0656  "04/24/23  1600   GLUCOSE mg/dL 208* 142* 132* 63* 63* 185*     Lab Results   Lab Value Date/Time    HGBA1C 8.30 (H) 03/28/2023 1613    HGBA1C 8.10 (H) 12/30/2022 1056         Results from last 7 days   Lab Units 04/25/23  0305   PROBNP pg/mL 39,505.0*         Medications    Medications Reviewed: Yes  Pertinent: /Bumex, Pepcid, Abc, Miralax, Pericolace   Infusion:  PRN:       Intake/Ouptut 24 hrs (0701 - 0700)   I&O's Reviewed: Yes     Intake & Output (last day)       04/25 0701  04/26 0700    P.O.     Total Intake(mL/kg)     Urine (mL/kg/hr) 300 (0.3)    Stool 0    Total Output 300    Net -300         Stool Unmeasured Occurrence 3 x            Anthropometrics     Admission Height 172.7 cm (68\") Documented at 04/17/2023 1550   Admission Weight 77.1 kg (170 lb) Documented at 04/17/2023 1550     Height: Height: 172 cm (67.72\")  Last Filed Weight: Weight: 79.6 kg (175 lb 8 oz) (04/25/23 0304)  Method: Weight Method: Bed scale  BMI: BMI (Calculated): 26.9  BMI classification: Overweight: 25.0-29.9kg/m2     UBW: Per  lbs in Jan.  Weight change: unable to confirm change 2/2 possible contribution of fluid to wts.    Nutrition Focused Physical Exam     Date: 4/25    Patient meets criteria for severe acute malnutrition diagnosis, see MSA note.    Current Nutrition Prescription     PO: Diet: Regular/House Diet; Texture: Regular Texture (IDDSI 7); Fluid Consistency: Thin (IDDSI 0)  Oral Nutrition Supplement: Premier Protein 3x/da added per RD  Intake: 16% x 8 meals recorded    Nutrition Diagnosis   Date: 4/25 Updated:   Problem Malnutrition  severe acute   Etiology Nutrition needs incr w wounds;    Signs/Symptoms Intake hx w wasting   Status:     Goal:   General: Nutrition to support treatment  PO: Increase intake  EN/PN: N/A    Nutrition Intervention      Follow treatment progress, Care plan reviewed, Advise alternate selection, Menu provided, Menu adjusted, Supplement provided        Monitoring/Evaluation:   Per " protocol, I&O, PO intake, Supplement intake, Pertinent labs, Weight, Skin status, Symptoms      Mary Lujan RD  Time Spent: 30 min

## 2023-04-26 NOTE — PROGRESS NOTES
[facility] Medicine Services  PROGRESS NOTE    Patient Name: Susan Anderson  : 1940  MRN: [id number]    Date of Admission: 2023  Primary Care Physician: Arleen Doherty MD    Subjective   Subjective     CC:  CHF exacerbation    HPI:  Sitting up on side of bed, feels like she can breath better upright. Dtr in room, states patient appears to be doing better this afternoon. Agreeable to Palliative care today. Discussed need for increased free water intake.     Objective   Objective     Vital Signs:   Temp:  [97.5 °F (36.4 °C)-98.4 °F (36.9 °C)] 97.5 °F (36.4 °C)  Heart Rate:  [69-83] 69  Resp:  [16-18] 16  BP: (146-162)/(67-89) 146/71  Flow (L/min):  [3.5] 3.5     Physical Exam:  Constitutional: No acute distress, awake, alert, frail and elderly  HENT: NCAT, mucous membranes moist  Respiratory: Decreased bases with fine rales, respiratory effort normal   Cardiovascular: RRR, no murmurs, rubs, or gallops  Gastrointestinal: Positive bowel sounds, soft, nontender, nondistended  Musculoskeletal: No bilateral ankle edema  Psychiatric: Appropriate affect, cooperative  Neurologic: Oriented x 3, strength symmetric in all extremities, Cranial Nerves grossly intact to confrontation, speech clear  Skin: No rashes     Results Reviewed: CXR personally reviewed and interpreted: worsening bilateral infiltrates c/w pulm edema + pleural effusions. proBNP much more elevated  LAB RESULTS:      Lab 23  0501 23  0451   WBC 7.51 7.25 8.45  --    HEMOGLOBIN 11.7* 10.6* 10.8*  --    HEMATOCRIT 39.6 37.1 37.9  --    PLATELETS 267 234 233  --    .5* 102.2* 103.6*  --    LDH  --   --   --  313*         Lab 23  0305 23  0501 23  0313 23  0451   SODIUM 152* 151* 146* 150* 148*   POTASSIUM 4.8 4.2 4.4 3.9 3.8   CHLORIDE 105 107 104 105 106   CO2 33.0* 35.0* 37.0* 34.0* 35.0*   ANION GAP 14.0 9.0 5.0 11.0 7.0   BUN 70*  69* 71* 67* 67*   CREATININE 1.73* 1.74* 1.82* 1.36* 1.33*   EGFR 28.8* 28.6* 27.1* 38.5* 39.5*   GLUCOSE 191* 58* 225* 134* 167*   CALCIUM 8.6 8.2* 8.3* 8.6 7.8*         Lab 04/20/23  0451   TOTAL PROTEIN 4.4*   ALBUMIN 2.3*   GLOBULIN 2.1   ALT (SGPT) 11   AST (SGOT) 21   BILIRUBIN 0.8   ALK PHOS 139*         Lab 04/25/23  0305   PROBNP 39,505.0*                 Brief Urine Lab Results  (Last result in the past 365 days)      Color   Clarity   Blood   Leuk Est   Nitrite   Protein   CREAT   Urine HCG        04/22/23 1044 Yellow   Cloudy   Small (1+)   Small (1+)   Negative   >=300 mg/dL (3+)                 Microbiology Results Abnormal     Procedure Component Value - Date/Time    Anaerobic Culture - Pleural Fluid, Pleural Cavity [[id number]]  (Normal) Collected: 04/19/23 1220    Lab Status: Final result Specimen: Pleural Fluid from Pleural Cavity Updated: 04/24/23 0648     Anaerobic Culture No anaerobes isolated at 5 days    Blood Culture - Blood, Arm, Right [[id number]]  (Normal) Collected: 04/17/23 1553    Lab Status: Final result Specimen: Blood from Arm, Right Updated: 04/22/23 1700     Blood Culture No growth at 5 days    Blood Culture - Blood, Arm, Left [[id number]]  (Normal) Collected: 04/17/23 1600    Lab Status: Final result Specimen: Blood from Arm, Left Updated: 04/22/23 1700     Blood Culture No growth at 5 days    Body Fluid Culture - Body Fluid, Pleural Cavity [[id number]] Collected: 04/19/23 1220    Lab Status: Final result Specimen: Body Fluid from Pleural Cavity Updated: 04/22/23 0819     Body Fluid Culture No growth at 3 days     Gram Stain Moderate (3+) WBCs seen      No organisms seen          XR Chest 1 View    Result Date: 4/25/2023  XR CHEST 1 VW Date of Exam: 4/25/2023 5:35 AM EDT Indication: DYSPNEA, ON EXERTION dyspnea Comparison: 4/20/2023 Findings: There is gradually worsening opacity of both lower and mid lungs, apparently increasing pleural effusion and atelectasis, now with only the  upper lungs remaining well aerated. Heart shadow is partly obscured but appears enlarged. There appears to be persistent perihilar edema.     Impression: Impression: Extensive and worsening bibasilar atelectasis and effusion, and persistent pulmonary vascular congestion. Electronically Signed: Breezy Nuñezd  4/25/2023 8:59 AM EDT  Workstation ID: [id number]      Results for orders placed during the hospital encounter of 04/17/23    Adult Transthoracic Echo Complete W/ Cont if Necessary Per Protocol    Interpretation Summary  •  Left ventricular systolic function is moderately decreased. Left ventricular ejection fraction appears to be 36 - 40%.  •  Left ventricular diastolic function is consistent with (grade I) impaired relaxation.  •  Trace to mild mitral vegetation.  •  A left sided pleural effusion is noted.      Current medications:  Scheduled Meds:apixaban, 2.5 mg, Oral, Q12H  atorvastatin, 40 mg, Oral, Nightly  bumetanide, 2 mg, Intravenous, BID  clopidogrel, 75 mg, Oral, Daily  empagliflozin, 10 mg, Oral, Daily  famotidine, 20 mg, Oral, Daily  insulin lispro, 0-7 Units, Subcutaneous, TID With Meals  losartan, 50 mg, Oral, Q24H  metoprolol succinate XL, 25 mg, Oral, Q24H  minocycline, 100 mg, Oral, Q12H  nystatin, 5 mL, Swish & Swallow, 4x Daily  nystatin, , Topical, Q12H  polyethylene glycol, 17 g, Oral, Daily  senna-docusate sodium, 2 tablet, Oral, BID  sertraline, 50 mg, Oral, Daily      Continuous Infusions:Pharmacy Consult - Pharmacy to dose,       PRN Meds:.•  senna-docusate sodium **AND** polyethylene glycol **AND** bisacodyl **AND** bisacodyl  •  nitroglycerin  •  ondansetron  •  Pharmacy Consult - Pharmacy to dose  •  prochlorperazine **OR** prochlorperazine **OR** prochlorperazine  •  sodium chloride  •  traMADol    Assessment & Plan   Assessment & Plan     Active Hospital Problems    Diagnosis  POA   • **Cellulitis of right foot due to methicillin-resistant Staphylococcus aureus [L03.115, B95.62]  Yes    • Severe Malnutrition ([facility]) [E43]  Yes   • Acute respiratory failure with hypoxia and hypercarbia [J96.01, J96.02]  Unknown   • T2DM  [E11.9]  Yes   • Paroxysmal atrial fibrillation [I48.0]  Yes   • Chronic combined systolic and diastolic heart failure  [I50.42]  Yes   • NICM (nonischemic cardiomyopathy) ([facility]) [I42.8]  Yes   • Dyslipidemia [E78.5]  Yes   • Mitral valve disease [I05.9]  Yes   • Acute on chronic systolic CHF (congestive heart failure) [I50.23]  Yes   • Diabetic foot ulcer [E11.621, L97.509]  Yes   • PVD (peripheral vascular disease) ([facility]) [I73.9]  Yes   • Diabetes mellitus with neuropathy [E11.40]  Yes   • Stage 3a chronic kidney disease [N18.31]  Yes   • Essential hypertension [I10]  Yes      Resolved Hospital Problems   No resolved problems to display.        Brief Hospital Course to date:  Susan Anderson is a 83 y.o. female w multiple readmissions for severe CHFpEF, PEA arrest 1/2023, CKDV w h/o LACEY-D who presented again with increased dyspnea and fatigue. On 4/25, worsened pulmonary edema on CXR and more elevated proBNP    Acute on chronic CHFpEF c/b pleural effusions - worse 4/25  Acute on chronic hypoxic resp failure 2/2 above  -increase to IV bumex BID, BMP in am for monitoring  -cardiology follows; cont diuretics while monitoring renal function closely; added Jardiance today  -clarifying home O2 script, per other notes on 1 liter n/c. Now requiring 3-4 liter w increased WOB  -overall, her CHF is very severe bears a poor prognosis given frequent readmissions    Hypernatremia  --likely due to free water deficit  --discussed water intake with patient and family today  --BMP in am     CKDIV - complicated diuresis, will need close monitoring. On ARB, low threshold to hold    Chronic bilateral heel wounds - minocycline per ID, wound care and vasc surgery clinic f/u    PUD - pepcid  Paroxysmal Afib - eliquis, BB  Weight loss - 25 kg weight loss in last 6 months on review. C/w poor  prognosis    Overall, worry about patient's escalating CHF course    Expected Discharge Location and Transportation: [place]  Expected Discharge 4/27 (Discharge date is tentative pending patient's medical condition and is subject to change)  Expected Discharge Date: 04/27/23       DVT prophylaxis:  Medical DVT prophylaxis orders are present.     AM-PAC 6 Clicks Score (PT): 10 (04/25/23 1012)    CODE STATUS:   Code Status and Medical Interventions:   Ordered at: 04/17/23 1800     Code Status (Patient has no pulse and is not breathing):    No CPR (Do Not Attempt to Resuscitate)     Medical Interventions (Patient has pulse or is breathing):    Full       Erica Barnett, APRN  04/26/23

## 2023-04-26 NOTE — PROGRESS NOTES
INFECTIOUS DISEASE Progress Note    Susan Anderson  1940  [id number]    Admission Date: 4/17/2023      Requesting Provider: No ref. provider found  Evaluating Physician: Stef Pete MD    Reason for Consultation: Bilateral foot infections    History of present illness:    4/18/23: Patient is a 83 y.o. female With type 2 diabetes mellitus, stage III chronic kidney disease, coronary artery disease, systolic/diastolic congestive heart failure, Severe peripheral vascular disease, recurrent bilateral foot infections, recent bilateral heel decubitus ulcers, a recent MRSA right toe wound infection, and recent Covid 19 in late January 2023 who is seen today after she presented with dyspnea and fatigue, and malaise. I saw her recently in the office with bilateral heel decubitus ulcers and a right fourth toe ulcer with associated cellulitis.  I initially started her on Augmentin therapy but then switched to minocycline after a right fourth toe culture returned positive for MRSA. I followed her during an admission from 1/3 until 1/20/23 for bibasilar pneumonia, Klebsiella/E. Coli UTI, and PEA arrest with acute hypoxic respiratory failure.  She suffered from acute renal failure requiring dialysis but her dialysis has subsequently been discontinued.  She was readmitted from 1/28 to 2/2/23 for Covid 19 infection for which she received Decadron but was not able to receive remdesivir due to bradycardia.  She was readmitted last night with dyspnea and acute hypoxic respiratory failure.  She has required BiPAP and is currently on 30% FiO2 with an O2 saturation of 98%.  She is lethargic and unable to provide any history. Chest CT scan revealed moderate to large bilateral pleural effusions and bibasilar atelectasis with cardiomegaly.  She is undergoing diuresis.She has been started on intravenous daptomycin.  She has remained afebrile.Her pro-BNP yesterday was 19,401. Her creatinine was 1.63.Her white blood cell count  was 8.1.Her creatinine today is 1.4 and her white blood cell count is 6.4.     4/19/23:She remains afebrile.She is on 3 L of oxygen with an O2 saturation of 93%. X-ray today reveals right greater than left effusions/opacities.  She is scheduled to undergo right thoracentesis today.  She has decreased dyspnea.  She has some nausea but denies vomiting.    4/20/23:She remains afebrile.  Her creatinine is 1.33. Her O2 saturation is 94% on 3 L. She underwent ultrasound-guided thoracentesis of the right pleural effusion yesterday yielding 1.35 L of pleural fluid. She had increased dyspnea immediately after her thoracentesis but this has dramatically improved.  Her chest x-ray shortly after thoracentesis revealed marked improvement but then she had evidence of bilateral congestion consistent with pulmonary edema. She transiently received Zosyn yesterday due to concerns over possible aspiration pneumonia but this has been discontinued by Dr. Felder.    4/21/23:She remains afebrile.Pleural fluid culture from 4/19 no growth so far. Her MRSA right foot isolate from 4/12 was sensitive to tetracycline and Bactrim.  She is currently on 3 L of oxygen with an O2 saturation of 96-99%.  She has decreased dyspnea.  She denies cough and sputum production.    4/22/23: Afebrile.  Pleural fluid culture negative from 4/19.  She is currently on 3L O2 with % O2 sat.  Her feet feel better.  She has no worsening shortness of breath.  She denies cough, n/v/d, rashes.     4/23/23: Afebrile.  Reviewed pictures from 4/22.  She remained on 3L O2 with % O2 sat.  Urine cultures from 4/22 with 25K GNR and UA WBC 6-12 with small LE/negative nitrite.  The culture was ordered d/t worsening nausea.  Per family in room, she has been confused today.     4/24/23:  Her creatinine today is 1.8.  White blood cell count is 8.5.  Urinalysis from 4/22 revealed 6-12 white blood cells. Urine culture is growing 25,000 colonies of ESBL Klebsiella.  She is  currently sedated.  She cannot provide a reliable ROS at present.    4/25/23: The nursing staff indicate that she has remained lethargic on Klonopin therapy. She is on 3.5 L of oxygen with an O2 saturation of 92%. She has remained afebrile.Her creatinine today is 1.74.  White blood cell count is 7.3.  She was more alert when I saw her this morning.  She denies increased dyspnea.    4/26/23:She has remained afebrile.  Her creatinine today is 1.73.  Her white blood cell count is 7.5. Respiratory virus panel PCR yesterday was positive for parainfluenza virus 3. Her chest x-ray yesterday morning revealed extensive and worsening bibasilar atelectasis/effusion persistent pulmonary vascular congestion. She complained of increased dyspnea earlier but later this morning she was improved.    Past Medical History:   Diagnosis Date   • Anemia 8/22   • Anxiety    • Arthritis    • Asthma 6/4 - double pneumonia    Currently on inhaler and nebulizer   • Atrial fibrillation 08/21/2022   • Brain tumor     Benign and followed at    • Cancer     cervical cancer, skin cancer   • CHF (congestive heart failure) 06/04/2021   • Chronic kidney disease Related to diabetes   • Clotting disorder 10/22    Upper GI bleed from blood thinners aggravate ulcers   • Coronary artery disease 6/4/2021 DX for hear failure   • COVID-19 01/28/2023   • Depression 8/22   • Diabetes mellitus 30 years    Seeing Dr. Lam 1st time Aug 19   • Dizziness 07/27/2022   • Effusion of right knee 08/12/2022   • Fall 07/27/2022   • GERD (gastroesophageal reflux disease)    • Gout    • History of medical problems 8/22    AFIB   • History of staph infection 10/2021    right toe   • History of transfusion     no reactions, 1 unit, [facility]   • HL (hearing loss)     Acoustic neuroma right   • Hx of colonoscopy    • Hyperlipidemia Reference current labs x 2-3yrs approx   • Hypertension 30 years   • Hypomagnesemia 10/06/2022   • Insomnia    • Low back pain    • Migraine    •  Mitral valve disease    • Mitral valve disease    • Osteomyelitis    • Peptic ulceration 10/22    Secondary to blood thinners   • Peripheral neuropathy    • Physical deconditioning 05/17/2022   • Pneumonia due to infectious organism 06/04/2021   • Pressure injury of skin of sacral region 08/21/2022   • Renal insufficiency 2021   • Sepsis 01/28/2023   • Sleep apnea    • Syncope, unspecified syncope type 10/06/2022   • Type 2 diabetes mellitus     30 years   • Weakness 07/27/2022       Past Surgical History:   Procedure Laterality Date   • ABDOMINAL HYSTERECTOMY W/SALPINGECTOMY     • AMPUTATION  Right great toe, 1st 1/3 metatarsal -Reg 4/14/21   • AORTAGRAM N/A 04/09/2021    Procedure: AORTAGRAM WITH OR WITHOUT RUNOFFS, WITH Co2;  Surgeon: Trey Forrest MD;  Location:  [facility];  Service: Vascular;  Laterality: N/A;   • AORTAGRAM N/A 09/28/2022    Procedure: CO2 ANGIOGRAM, LEFT SFA ANGIOPLASTY WITH DRUG ELUTING BALLOON, LEFT SFA STENT PLACEMENT ;  Surgeon: Trey Forrest MD;  Location:  [facility];  Service: Vascular;  Laterality: N/A;  FLUORO: 13.12  DOSE 162mGy  CONTRAST: Isovue 350: 15ml   • APPENDECTOMY     • BRAIN TUMOR EXCISION      laser surgery    • CARDIAC CATHETERIZATION N/A 06/21/2021    Procedure: LEFT HEART CATH;  Surgeon: Anjum Ceballos MD;  Location:  [facility] CATH INVASIVE LOCATION;  Service: Cardiology;  Laterality: N/A;   • CATARACT EXTRACTION W/ INTRAOCULAR LENS  IMPLANT, BILATERAL     • CHOLECYSTECTOMY     • COLONOSCOPY     • ENDOSCOPY N/A 10/07/2022    Procedure: ESOPHAGOGASTRODUODENOSCOPY;  Surgeon: Stef Veras MD;  Location:  [facility];  Service: Gastroenterology;  Laterality: N/A;   • EYE SURGERY     • FEMORAL ARTERY STENT  10/2022   • HYSTERECTOMY     • INTERVENTIONAL RADIOLOGY PROCEDURE N/A 1/15/2023    Procedure: CV INTERVENTIONAL RADIOLOGY PROCEDURE;  Surgeon: Sanket Zapata MD;  Location:  [facility] CATH INVASIVE LOCATION;  Service: Interventional Radiology;   Laterality: N/A;   • TOE SURGERY     • TRANS METATARSAL AMPUTATION Right 04/14/2021    Procedure: AMPUTATION TRANS METATARSAL RIGHT GREAT TOE;  Surgeon: Valdez Finney MD;  Location: [facility];  Service: Vascular;  Laterality: Right;       Family History   Problem Relation Age of Onset   • Diabetes Mother         Type II   • Heart disease Father    • Heart attack Father    • Coronary artery disease Father    • Diabetes Father         Type II   • Diabetes Sister    • Diabetes Maternal Grandmother    • Diabetes Maternal Grandfather    • Diabetes Paternal Grandmother    • Diabetes Paternal Grandfather        Social History     Socioeconomic History   • Marital status:    • Number of children: 1   Tobacco Use   • Smoking status: Never   • Smokeless tobacco: Never   Vaping Use   • Vaping Use: Never used   Substance and Sexual Activity   • Alcohol use: Not Currently     Comment: Social drinking when not recovering from hospitalization   • Drug use: Never   • Sexual activity: Not Currently     Birth control/protection: None       Allergies   Allergen Reactions   • Vancomycin Other (See Comments)     Acute Kidney Injury, requested by ID   • Baclofen Other (See Comments) and Hallucinations     PSYCHOSIS-POA REFUSES ADMINISTRATION OF THIS MED.   • Cephalexin Nausea Only   • Erythromycin Base Nausea Only   • Melatonin Other (See Comments)     Nightmares   • Oxycodone Nausea Only   • Protonix [Pantoprazole] Itching and Rash   • Sulfa Antibiotics Nausea Only         Medication:    Current Facility-Administered Medications:   •  apixaban (ELIQUIS) tablet 2.5 mg, 2.5 mg, Oral, Q12H, Breezy Felder MD, 2.5 mg at 04/25/23 2100  •  atorvastatin (LIPITOR) tablet 40 mg, 40 mg, Oral, Nightly, Faiza Angulo MD, 40 mg at 04/25/23 2101  •  sennosides-docusate (PERICOLACE) 8.6-50 MG per tablet 2 tablet, 2 tablet, Oral, BID, 2 tablet at 04/25/23 0926 **AND** polyethylene glycol (MIRALAX) packet 17 g, 17 g, Oral, Daily PRN  **AND** bisacodyl (DULCOLAX) EC tablet 5 mg, 5 mg, Oral, Daily PRN **AND** bisacodyl (DULCOLAX) suppository 10 mg, 10 mg, Rectal, Daily PRN, Faiza Angulo MD  •  bumetanide (BUMEX) injection 2 mg, 2 mg, Intravenous, BID, Macy Hall MD, 2 mg at 04/25/23 2101  •  clopidogrel (PLAVIX) tablet 75 mg, 75 mg, Oral, Daily, Faiza Angulo MD, 75 mg at 04/25/23 0925  •  famotidine (PEPCID) tablet 20 mg, 20 mg, Oral, Daily, Breezy Felder MD, 20 mg at 04/25/23 0925  •  Insulin Lispro (humaLOG) injection 0-7 Units, 0-7 Units, Subcutaneous, TID With Meals, Faiza Angulo MD, 2 Units at 04/25/23 1725  •  losartan (COZAAR) tablet 50 mg, 50 mg, Oral, Q24H, Anjum Ceballos MD, 50 mg at 04/25/23 0919  •  metoprolol succinate XL (TOPROL-XL) 24 hr tablet 25 mg, 25 mg, Oral, Q24H, Anjum Ceballos MD, 25 mg at 04/25/23 0931  •  minocycline (MINOCIN,DYNACIN) capsule 100 mg, 100 mg, Oral, Q12H, Stef Pete MD, 100 mg at 04/25/23 2101  •  nitroglycerin (NITROSTAT) ointment 0.5 inch, 0.5 inch, Topical, Q6H PRN, Faiza Angulo MD  •  nystatin (MYCOSTATIN) 100,000 unit/mL suspension 500,000 Units, 5 mL, Swish & Swallow, 4x Daily, Breezy Felder MD, 500,000 Units at 04/25/23 2101  •  nystatin (MYCOSTATIN) powder, , Topical, Q12H, Breezy Felder MD, Given at 04/25/23 2101  •  ondansetron (ZOFRAN) injection 4 mg, 4 mg, Intravenous, Q6H PRN, Breezy Felder MD, 4 mg at 04/25/23 1645  •  Pharmacy Consult - Pharmacy to dose, , Does not apply, Continuous PRN, Faiza Angulo MD  •  polyethylene glycol (MIRALAX) packet 17 g, 17 g, Oral, Daily, Faiza Angulo MD, 17 g at 04/24/23 0945  •  prochlorperazine (COMPAZINE) injection 5 mg, 5 mg, Intravenous, Q6H PRN, 5 mg at 04/22/23 0915 **OR** prochlorperazine (COMPAZINE) tablet 5 mg, 5 mg, Oral, Q6H PRN **OR** prochlorperazine (COMPAZINE) suppository 25 mg, 25 mg, Rectal, Q12H PRN, Breezy Felder MD  •  sertraline (ZOLOFT) tablet 50 mg, 50 mg, Oral, Daily, Woody, Faiza  MD ELMO, 50 mg at 23 0925  •  sodium chloride 0.9 % flush 10 mL, 10 mL, Intravenous, PRN, Faiza Angulo MD, 10 mL at 23 2101  •  traMADol (ULTRAM) tablet 25 mg, 25 mg, Oral, Q12H PRN, Faiza Angulo MD, 25 mg at 23 0937    Antibiotics:  Anti-Infectives (From admission, onward)    Ordered     Dose/Rate Route Frequency Start Stop    23 1700  minocycline (MINOCIN,DYNACIN) capsule 100 mg        Ordering Provider: Stef Pete MD    100 mg Oral Every 12 Hours Scheduled 23 2100 23 20523 1545  piperacillin-tazobactam (ZOSYN) 3.375 g in iso-osmotic dextrose 50 ml (premix)        Ordering Provider: Breezy Felder MD    3.375 g  over 30 Minutes Intravenous Once 23 1645 23 1752    23 1542  DAPTOmycin (CUBICIN) 300 mg in sodium chloride 0.9 % 50 mL IVPB        Note to Pharmacy: MRSA w/ restriction from Vanco per ID.   Ordering Provider: Kings Herrera MD    300 mg  100 mL/hr over 30 Minutes Intravenous Once 23 1600 23 1801            Review of Systems:  See HPI    Physical Exam:   Vital Signs  Temp (24hrs), Av.4 °F (36.9 °C), Min:98.2 °F (36.8 °C), Max:98.5 °F (36.9 °C)    Temp  Min: 98.2 °F (36.8 °C)  Max: 98.5 °F (36.9 °C)  BP  Min: 153/89  Max: 159/88  Pulse  Min: 64  Max: 78  Resp  Min: 16  Max: 18  SpO2  Min: 92 %  Max: 100 %    GENERAL: Severely debilitated appearing  HEENT: No labial ulcers    NECK: Supple   LYMPH: No cervical, axillary or inguinal lymphadenopathy.  HEART: RRR; No murmur, rubs, gallops.   LUNGS: .Decreased breath sounds at the right base with scattered rales.  ABDOMEN: Soft, nontender, nondistended.   EXT:  1+ bilateral lower extremity edema.  She has bilateral 1.5 cm heel pressure ulcers.  The lateral right fourth toe ulcer has only minimal surrounding erythema present.  MSK: No joint effusions or erythema  SKIN: No diffuse rash present.    NEURO: Alert and responsive today.  She moves all of her  extremities.                    Laboratory Data    Results from last 7 days   Lab Units 04/26/23  0357 04/25/23  0305 04/24/23  0501   WBC 10*3/mm3 7.51 7.25 8.45   HEMOGLOBIN g/dL 11.7* 10.6* 10.8*   HEMATOCRIT % 39.6 37.1 37.9   PLATELETS 10*3/mm3 267 234 233     Results from last 7 days   Lab Units 04/26/23  0357   SODIUM mmol/L 152*   POTASSIUM mmol/L 4.8   CHLORIDE mmol/L 105   CO2 mmol/L 33.0*   BUN mg/dL 70*   CREATININE mg/dL 1.73*   GLUCOSE mg/dL 191*   CALCIUM mg/dL 8.6     Results from last 7 days   Lab Units 04/20/23  0451   ALK PHOS U/L 139*   BILIRUBIN mg/dL 0.8   ALT (SGPT) U/L 11   AST (SGOT) U/L 21                 Results from last 7 days   Lab Units 04/24/23  0501   CK TOTAL U/L 44         Estimated Creatinine Clearance: 26.7 mL/min (A) (by C-G formula based on SCr of 1.73 mg/dL (H)).      Microbiology:  Microbiology Results (last 10 days)     Procedure Component Value - Date/Time    Respiratory Panel PCR w/COVID-19(SARS-CoV-2) [facility] In-House, NP Swab in UTM/VTM, 3-4 HR TAT - Swab, Nasopharynx [[id number]]  (Abnormal) Collected: 04/25/23 1142    Lab Status: Final result Specimen: Swab from Nasopharynx Updated: 04/25/23 1349     ADENOVIRUS, PCR Not Detected     Coronavirus 229E Not Detected     Coronavirus HKU1 Not Detected     Coronavirus NL63 Not Detected     Coronavirus OC43 Not Detected     COVID19 Not Detected     Human Metapneumovirus Not Detected     Human Rhinovirus/Enterovirus Not Detected     Influenza A PCR Not Detected     Influenza B PCR Not Detected     Parainfluenza Virus 1 Not Detected     Parainfluenza Virus 2 Not Detected     Parainfluenza Virus 3 Detected     Parainfluenza Virus 4 Not Detected     RSV, PCR Not Detected     Bordetella pertussis pcr Not Detected     Bordetella parapertussis PCR Not Detected     Chlamydophila pneumoniae PCR Not Detected     Mycoplasma pneumo by PCR Not Detected    Narrative:      In the setting of a positive respiratory panel  with a viral infection PLUS a negative procalcitonin without other underlying concern for bacterial infection, consider observing off antibiotics or discontinuation of antibiotics and continue supportive care. If the respiratory panel is positive for atypical bacterial infection (Bordetella pertussis, Chlamydophila pneumoniae, or Mycoplasma pneumoniae), consider antibiotic de-escalation to target atypical bacterial infection.    Urine Culture - Urine, Urine, Clean Catch [[id number]]  (Abnormal)  (Susceptibility) Collected: 04/22/23 1044    Lab Status: Final result Specimen: Urine, Clean Catch Updated: 04/24/23 0729     Urine Culture 25,000 CFU/mL Klebsiella pneumoniae ESBL     Comment:   Consider infectious disease consult.  Susceptibility results may not correlate to clinical outcomes.       Narrative:      Colonization of the urinary tract without infection is common. Treatment is discouraged unless the patient is symptomatic, pregnant, or undergoing an invasive urologic procedure.  Recent outcomes data supports the use of pip/tazo in the treatment of susceptible ESBL infections for uncomplicated UTI. Consider use of pip/tazo as a carbapenem-sparing regimen in applicable patients.    Susceptibility      Klebsiella pneumoniae ESBL      NAIMA      Ertapenem Susceptible      Gentamicin Susceptible      Levofloxacin Intermediate      Meropenem Susceptible      Nitrofurantoin Intermediate      Piperacillin + Tazobactam Resistant     Trimethoprim + Sulfamethoxazole Susceptible                           Body Fluid Culture - Body Fluid, Pleural Cavity [[id number]] Collected: 04/19/23 1220    Lab Status: Final result Specimen: Body Fluid from Pleural Cavity Updated: 04/22/23 0819     Body Fluid Culture No growth at 3 days     Gram Stain Moderate (3+) WBCs seen      No organisms seen    Anaerobic Culture - Pleural Fluid, Pleural Cavity [[id number]]  (Normal) Collected: 04/19/23 1220    Lab Status: Final result Specimen: Pleural  Fluid from Pleural Cavity Updated: 04/24/23 0648     Anaerobic Culture No anaerobes isolated at 5 days    Blood Culture - Blood, Arm, Left [[id number]]  (Normal) Collected: 04/17/23 1600    Lab Status: Final result Specimen: Blood from Arm, Left Updated: 04/22/23 1700     Blood Culture No growth at 5 days    Blood Culture - Blood, Arm, Right [[id number]]  (Normal) Collected: 04/17/23 1553    Lab Status: Final result Specimen: Blood from Arm, Right Updated: 04/22/23 1700     Blood Culture No growth at 5 days        Culture results from last 30 days   Lab 04/19/23  1220 04/12/23  0854   WOUNDCX  --  Moderate growth (3+) Staphylococcus aureus, MRSA*   ANACX No anaerobes isolated at 5 days  --            Radiology:  Imaging Results (Last 72 Hours)     Procedure Component Value Units Date/Time    XR Chest 1 View [[id number]] Collected: 04/25/23 0857     Updated: 04/25/23 0902    Narrative:      XR CHEST 1 VW    Date of Exam: 4/25/2023 5:35 AM EDT    Indication: DYSPNEA, ON EXERTION  dyspnea    Comparison: 4/20/2023    Findings:  There is gradually worsening opacity of both lower and mid lungs, apparently increasing pleural effusion and atelectasis, now with only the upper lungs remaining well aerated. Heart shadow is partly obscured but appears enlarged. There appears to be   persistent perihilar edema.      Impression:      Impression:  Extensive and worsening bibasilar atelectasis and effusion, and persistent pulmonary vascular congestion.      Electronically Signed: Breezy Rey    4/25/2023 8:59 AM EDT    Workstation ID: [id number]      I read her radiographic studies.      Impression:   1.  MRSA right foot wound infection-with cellulitis of the right fourth toe and bilateral decubitus heel ulcers.  This has occurred in the setting of severe peripheral vascular disease and severe debility.  These will be very difficult to heal.  She is S/P daptomycin therapy.  Her cellulitis has improved.  She is now on oral minocycline.  2.   Pulmonary edema  3.  Acute hypoxic respiratory failure-secondary to pulmonary edema/CHF  4.  Acute on chronic systolic/diastolic heart failure-with an ejection fraction of 36-40% by echocardiogram on 4/19/23.  5.  Paroxysmal atrial fibrillation  6.  History of paroxysmal atrial fibrillation  7.  Severe peripheral vascular disease  8.  Severe debility  9.  Type 2 diabetes mellitus-this places her at increased risk for recurrent infections  10.  Stage III chronic kidney disease  11.  Recent Covid 19 infection-1/28/23.  This has contributed to her debility  12.  GNR urinary colonization  13.  Encephalopathy--toxic vs metabolic vs other.   14.  Parainfluenza virus 3 URI    PLAN/RECOMMENDATIONS:   1.  Diuretic therapy per the hospitalist service  2.  Minocycline 100 mg p.o. twice daily x10 days  3.  Discharge soon to a subacute nursing facility    Her overall prognosis remains poor.  I discussed her situation again with her daughter today.       Stef Pete MD  4/26/2023  07:37 EDT

## 2023-04-26 NOTE — PROGRESS NOTES
CTS Progress Note      Chief Complaint: Bilateral nonhealing ulcerations      Subjective  No complaints.    Objective    Physical Exam:   Vital Signs   Temp:  [98.2 °F (36.8 °C)-98.5 °F (36.9 °C)] 98.2 °F (36.8 °C)  Heart Rate:  [64-78] 78  Resp:  [16-18] 18  BP: (153-159)/(67-89) 157/67   GEN: NAD   CV:  Regular rate and rhythm.  Extremities warm without significant edema.  Bilateral toe ulcers are covered with bandage no surrounding erythema drainage.  Breathing is unlabored on 3 L nasal cannula with absent oxygen saturation   Results     Results from last 7 days   Lab Units 04/26/23  0357   WBC 10*3/mm3 7.51   HEMOGLOBIN g/dL 11.7*   HEMATOCRIT % 39.6   PLATELETS 10*3/mm3 267     Results from last 7 days   Lab Units 04/26/23  0357   SODIUM mmol/L 152*   POTASSIUM mmol/L 4.8   CHLORIDE mmol/L 105   CO2 mmol/L 33.0*   BUN mg/dL 70*   CREATININE mg/dL 1.73*   GLUCOSE mg/dL 191*   CALCIUM mg/dL 8.6     Results from last 7 days   Lab Units 04/19/23  0840   INR  1.58*     ABIs:  Right Conclusion: The right HOMER is unable to be assessed due to vessel incompressibility. Unable to assess digital ischemia.  Waveforms are difficult to assess due to A-fib and PVCs.  Reported palpable pulses.  •  Left Conclusion: The left HOMER is unable to be assessed due to vessel incompressibility.  Possible severe digital ischemia. Waveforms are difficult to assess due to A-fib and PVCs.  Reported palpable pulses.  •  Findings overall are inconclusive.  Alternative evaluation methods such as an arterial duplex ultrasound or CT angiogram with runoff could be considered if clinically warranted.    Assessment & Plan   Peripheral arterial disease with a history of left SFA angioplasty and stent on 9/28/2022.   Nonhealing bilateral heel ulcers      Plan   -HOMER results noted above, unable to obtain due to vessel and compressibility  -Continue wound care and IV/oral antibiotic therapy per infectious disease and wound care team  -Currently no  plans from a vascular standpoint for surgical intervention  -Follow-up with vascular surgery 1 to 2 weeks following discharge    Please obtain full leg segmental blood pressures and pulse volume recordings     Sobia Padilla, APRN  04/26/23  07:45 EDT        --    I reviewed this documentation. I interviewed and examined this patient this morning. Plan as documented.     Billy Colvin M.D., R.P.V.I.  Cardiothoracic and Vascular Surgeon  [facility]

## 2023-04-26 NOTE — CASE MANAGEMENT/SOCIAL WORK
Continued Stay Note  [facility]     Patient Name: Susan Anderson  MRN: [id number]  Today's Date: 4/26/2023    Admit Date: 4/17/2023        Discharge Plan     Row Name 04/26/23 1004       Plan    Plan Comments Tl at [facility] said that they will start another pre cert on Thursday 4/27/2023 and they may be able to accept Mrs. Anderson on Friday. Case management will schedule an ambulance for Friday to [facility]..               Discharge Codes    No documentation.               Expected Discharge Date and Time     Expected Discharge Date Expected Discharge Time    Apr 27, 2023             AMELIA Andres

## 2023-04-26 NOTE — PROGRESS NOTES
"[facility] at [facility]  IP Progress Note      Chief Complaint: Follow-up of acute on chronic combined systolic/diastolic CHF    Subjective   Patient was noted to be volume overloaded and received extra diuretics yesterday.  Maintaining negative fluid balance.  Reports feeling well.  No current chest pain, breathing is comfortable.  She gets out of bed to chair but has not been ambulating.    Objective     Blood pressure 159/80, pulse 70, temperature 98.4 °F (36.9 °C), temperature source Oral, resp. rate 18, height 172 cm (67.72\"), weight 79.6 kg (175 lb 8 oz), SpO2 98 %.     Intake/Output Summary (Last 24 hours) at 4/26/2023 0809  Last data filed at 4/25/2023 2310  Gross per 24 hour   Intake --   Output 500 ml   Net -500 ml       Physical Exam:  General: No acute distress.   Neck: no JVD.  Chest:No respiratory distress, breath sounds are diminished at bases with fine crackles  Cardiovascular: Normal S1 and S2, regular/distant.  Extremities: No edema.    Results Review:     I reviewed the patient's new clinical results.    Results from last 7 days   Lab Units 04/26/23  0357   WBC 10*3/mm3 7.51   HEMOGLOBIN g/dL 11.7*   HEMATOCRIT % 39.6   PLATELETS 10*3/mm3 267     Results from last 7 days   Lab Units 04/26/23  0357 04/22/23  0313 04/20/23  0451   SODIUM mmol/L 152*   < > 148*   POTASSIUM mmol/L 4.8   < > 3.8   CHLORIDE mmol/L 105   < > 106   CO2 mmol/L 33.0*   < > 35.0*   BUN mg/dL 70*   < > 67*   CREATININE mg/dL 1.73*   < > 1.33*   CALCIUM mg/dL 8.6   < > 7.8*   BILIRUBIN mg/dL  --   --  0.8   ALK PHOS U/L  --   --  139*   ALT (SGPT) U/L  --   --  11   AST (SGOT) U/L  --   --  21   GLUCOSE mg/dL 191*   < > 167*    < > = values in this interval not displayed.     Results from last 7 days   Lab Units 04/26/23  0357   SODIUM mmol/L 152*   POTASSIUM mmol/L 4.8   CHLORIDE mmol/L 105   CO2 mmol/L 33.0*   BUN mg/dL 70*   CREATININE mg/dL 1.73*   GLUCOSE mg/dL 191*   CALCIUM mg/dL 8.6     Results " from last 7 days   Lab Units 04/19/23  0840   INR  1.58*     Lab Results   Component Value Date    CKTOTAL 44 04/24/2023    TROPONINT 310 (C) 04/17/2023                   Tele: Sinus Rythym      Assessment:  1. Cardiomyopathy/acute on chronic HFrEF  a. Echo 4/19/23: EF 36-40%, grade I LV diastolic dysfunction, trace to mild MR, L pleural effusion  b. 4/25/23 proBNP 39,500  c. 4/24/2023 CXR: Extensive and worsening bibasilar atelectasis and effusion  2. Bilateral pleural effusions   a. s/p thoracentesis 1.35 L pleural fluid 4/19/2023  3. Nonobstructive CAD  a. LHC 6/2021 mild to moderate nonobstructive multivessel CAD w/o any critical disease, EF 40-45%  4. Paroxysmal atrial fibrillation,   a. on Eliquis for anticoagulation  b. Currently in sinus rhythm  5. PEA Arrest 1/2023  6. Hypertension  7. Dyslipidemia, controlled on atorvastatin  8. CKD stage III  a. Creatinine 1.74  9. Type 2 diabetes  a. 3/2023 A1c 8.3  10. Extensive PAD with Bilateral Heal Ulcers/Cellulitis    Plan:  1. Responding well to IV diuretics, continue current dose of Bumex.  2. Monitor intake/output and renal function.  3. Add Jardiance 10 mg daily.  4. Continue current dose of Toprol and losartan.  5. Management of multiple medical conditions per hospitalist team.    Anjum Ceballos MD, [facility], [facility]

## 2023-04-26 NOTE — THERAPY WOUND CARE TREATMENT
Acute Care - Wound/Debridement Treatment Note  [facility]     Patient Name: Susan Anderson  : 1940  MRN: [id number]  Today's Date: 2023                Admit Date: 2023    Visit Dx:    ICD-10-CM ICD-9-CM   1. Cellulitis of right foot due to methicillin-resistant Staphylococcus aureus  L03.115 682.7    B95.62 041.12   2. Diabetic ulcer of heel associated with type 2 diabetes mellitus, unspecified laterality, unspecified ulcer stage  E11.621 250.80    L97.409 707.14   3. Stage 3b chronic kidney disease  N18.32 585.3   4. Diabetes mellitus type 2 in obese  E11.69 250.00    E66.9 278.00   5. Elevated troponin  R77.8 790.6   6. Elevated blood pressure reading with diagnosis of hypertension  I10 401.9       Patient Active Problem List   Diagnosis   • Diabetic foot ulcer   • PVD (peripheral vascular disease) ([facility])   • Osteomyelitis   • Diabetes mellitus with neuropathy   • Essential hypertension   • Stage 3a chronic kidney disease   • Acute on chronic systolic CHF (congestive heart failure)   • Mitral valve disease   • NICM (nonischemic cardiomyopathy) ([facility])   • CAD   • Dyslipidemia   • Chronic combined systolic and diastolic heart failure    • Lumbar stenosis with neurogenic claudication   • Spondylolisthesis, lumbar region   • Gait disturbance   • Anemia   • Sleep apnea   • Paroxysmal atrial fibrillation   • T2DM    • Iron deficiency anemia, unspecified   • Cellulitis of right foot due to methicillin-resistant Staphylococcus aureus   • Acute respiratory failure with hypoxia and hypercarbia   • Severe Malnutrition ([facility])        Past Medical History:   Diagnosis Date   • Anemia    • Anxiety    • Arthritis    • Asthma  - double pneumonia    Currently on inhaler and nebulizer   • Atrial fibrillation 2022   • Brain tumor     Benign and followed at    • Cancer     cervical cancer, skin cancer   • CHF (congestive heart failure) 2021   • Chronic kidney disease Related to diabetes   •  Clotting disorder 10/22    Upper GI bleed from blood thinners aggravate ulcers   • Coronary artery disease 6/4/2021 DX for hear failure   • COVID-19 01/28/2023   • Depression 8/22   • Diabetes mellitus 30 years    Seeing Dr. Lam 1st time Aug 19   • Dizziness 07/27/2022   • Effusion of right knee 08/12/2022   • Fall 07/27/2022   • GERD (gastroesophageal reflux disease)    • Gout    • History of medical problems 8/22    AFIB   • History of staph infection 10/2021    right toe   • History of transfusion     no reactions, 1 unit, [facility]   • HL (hearing loss)     Acoustic neuroma right   • Hx of colonoscopy    • Hyperlipidemia Reference current labs x 2-3yrs approx   • Hypertension 30 years   • Hypomagnesemia 10/06/2022   • Insomnia    • Low back pain    • Migraine    • Mitral valve disease    • Mitral valve disease    • Osteomyelitis    • Peptic ulceration 10/22    Secondary to blood thinners   • Peripheral neuropathy    • Physical deconditioning 05/17/2022   • Pneumonia due to infectious organism 06/04/2021   • Pressure injury of skin of sacral region 08/21/2022   • Renal insufficiency 2021   • Sepsis 01/28/2023   • Sleep apnea    • Syncope, unspecified syncope type 10/06/2022   • Type 2 diabetes mellitus     30 years   • Weakness 07/27/2022        Past Surgical History:   Procedure Laterality Date   • ABDOMINAL HYSTERECTOMY W/SALPINGECTOMY     • AMPUTATION  Right great toe, 1st 1/3 metatarsal -[place] 4/14/21   • AORTAGRAM N/A 04/09/2021    Procedure: AORTAGRAM WITH OR WITHOUT RUNOFFS, WITH Co2;  Surgeon: Trey Forrest MD;  Location: [facility];  Service: Vascular;  Laterality: N/A;   • AORTAGRAM N/A 09/28/2022    Procedure: CO2 ANGIOGRAM, LEFT SFA ANGIOPLASTY WITH DRUG ELUTING BALLOON, LEFT SFA STENT PLACEMENT ;  Surgeon: Trey Forrest MD;  Location: [facility];  Service: Vascular;  Laterality: N/A;  FLUORO: 13.12  DOSE 162mGy  CONTRAST: Isovue 350: 15ml   • APPENDECTOMY     • BRAIN TUMOR EXCISION       laser surgery    • CARDIAC CATHETERIZATION N/A 06/21/2021    Procedure: LEFT HEART CATH;  Surgeon: Anjum Ceballos MD;  Location:  [facility];  Service: Cardiology;  Laterality: N/A;   • CATARACT EXTRACTION W/ INTRAOCULAR LENS  IMPLANT, BILATERAL     • CHOLECYSTECTOMY     • COLONOSCOPY     • ENDOSCOPY N/A 10/07/2022    Procedure: ESOPHAGOGASTRODUODENOSCOPY;  Surgeon: Stef Veras MD;  Location:  [facility];  Service: Gastroenterology;  Laterality: N/A;   • EYE SURGERY     • FEMORAL ARTERY STENT  10/2022   • HYSTERECTOMY     • INTERVENTIONAL RADIOLOGY PROCEDURE N/A 1/15/2023    Procedure: CV INTERVENTIONAL RADIOLOGY PROCEDURE;  Surgeon: Sanket Zapata MD;  Location:  [facility];  Service: Interventional Radiology;  Laterality: N/A;   • TOE SURGERY     • TRANS METATARSAL AMPUTATION Right 04/14/2021    Procedure: AMPUTATION TRANS METATARSAL RIGHT GREAT TOE;  Surgeon: Valdez Finney MD;  Location:  [facility];  Service: Vascular;  Laterality: Right;           Wound 01/28/23 1613 Left posterior heel Pressure Injury (Active)   Pressure Injury Stage U 04/25/23 2000   Dressing Appearance dry;intact 04/26/23 1600   Closure DERICK 04/26/23 0840   Base moist;necrotic;black eschar 04/26/23 1600   Periwound intact;dry;redness 04/26/23 1600   Periwound Temperature warm 04/26/23 1600   Periwound Skin Turgor soft 04/26/23 1600   Edges open 04/26/23 1600   Drainage Characteristics/Odor serosanguineous 04/26/23 1600   Drainage Amount scant 04/26/23 1600   Care, Wound cleansed with;soap and water;debrided 04/26/23 1600   Dressing Care foam;low-adherent 04/26/23 1600   Periwound Care dry periwound area maintained 04/26/23 1600       Wound 03/15/23 1115 Left proximal leg Skin Tear (Active)   Pressure Injury Stage 1 04/25/23 2000   Dressing Appearance open to air;no drainage 04/25/23 2000   Closure Open to air 04/25/23 2000   Periwound intact;dry;pink 04/25/23 2000   Drainage Amount none 04/26/23  0840   Periwound Care dry periwound area maintained 04/25/23 2000       Wound 03/31/23 1345 Right medial heel Fissure (Active)   Pressure Injury Stage U 04/25/23 2000   Dressing Appearance intact;moist drainage 04/26/23 1600   Closure DERICK 04/26/23 0840   Base moist;pink;black eschar;yellow 04/26/23 1600   Periwound intact;dry;redness 04/26/23 1600   Periwound Temperature warm 04/26/23 1600   Periwound Skin Turgor soft 04/26/23 1600   Edges open 04/26/23 1600   Drainage Characteristics/Odor serosanguineous 04/26/23 1600   Drainage Amount scant 04/26/23 1600   Care, Wound irrigated with;sterile normal saline;debrided 04/26/23 1600   Dressing Care foam;low-adherent 04/26/23 1600   Periwound Care dry periwound area maintained 04/25/23 2000       Wound 04/11/23 1300 Right lateral fourth toe Soft Tissue Necrosis (Active)   Pressure Injury Stage U 04/25/23 2000   Dressing Appearance dry 04/25/23 2000   Closure Open to air 04/25/23 2000   Periwound intact;dry;redness 04/25/23 2000   Periwound Temperature warm 04/25/23 2000   Periwound Skin Turgor soft 04/25/23 2000   Drainage Amount none 04/26/23 0840   Dressing Care open to air 04/25/23 2000   Periwound Care dry periwound area maintained 04/25/23 2000         WOUND DEBRIDEMENT  Total area of Debridement: ~1cm2  Debridement Site 1  Location- Site 1: B heels  Selective Debridement- Site 1: Wound Surface <20cmsq  Instruments- Site 1: #15, scapel, tweezers  Excised Tissue Description- Site 1: minimum, slough  Bleeding- Site 1: none               PT Assessment (last 12 hours)     PT Evaluation and Treatment     Row Name 04/26/23 1600          Physical Therapy Time and Intention    Subjective Information complains of;weakness;fatigue;pain  -MF     Document Type therapy note (daily note);wound care  -MF     Mode of Treatment individual therapy;physical therapy  -     Row Name 04/26/23 1600          Pain    Pretreatment Pain Rating 4/10  -MF     Posttreatment Pain Rating 4/10   -MF     Pain Location - Side/Orientation Bilateral  -MF     Pain Location - --  heels  -MF     Row Name 04/26/23 1600          Wound 01/28/23 1613 Left posterior heel Pressure Injury    Wound - Properties Group Placement Date: 01/28/23  -WR Placement Time: 1613 -WR Present on Hospital Admission: Y  -WR Side: Left  -WR Orientation: posterior  -WR Location: heel  -WR Primary Wound Type: Pressure inj  -WR    Dressing Appearance dry;intact  -MF     Base moist;necrotic;black eschar  -MF     Periwound intact;dry;redness  -MF     Periwound Temperature warm  -MF     Periwound Skin Turgor soft  -MF     Edges open  -MF     Drainage Characteristics/Odor serosanguineous  -MF     Drainage Amount scant  -MF     Care, Wound cleansed with;soap and water;debrided  -MF     Dressing Care foam;low-adherent  Therahoney with optifoam  -MF     Periwound Care dry periwound area maintained  -MF     Retired Wound - Properties Group Placement Date: 01/28/23  -WR Placement Time: 1613 -WR Present on Hospital Admission: Y  -WR Side: Left  -WR Orientation: posterior  -WR Location: heel  -WR Primary Wound Type: Pressure inj  -WR    Retired Wound - Properties Group Date first assessed: 01/28/23  -WR Time first assessed: 1613  -WR Present on Hospital Admission: Y  -WR Side: Left  -WR Location: heel  -WR Primary Wound Type: Pressure inj  -WR    Row Name             Wound 04/11/23 1300 Right lateral fourth toe Soft Tissue Necrosis    Wound - Properties Group Placement Date: 04/11/23  -MC Placement Time: 1300  -MC Present on Hospital Admission: N  -MC Side: Right  -MC Orientation: lateral  -MC Location: fourth toe  -MC Primary Wound Type: Soft tissue  -MC    Retired Wound - Properties Group Placement Date: 04/11/23  -MC Placement Time: 1300  -MC Present on Hospital Admission: N  -MC Side: Right  -MC Orientation: lateral  -MC Location: fourth toe  -MC Primary Wound Type: Soft tissue  -MC    Retired Wound - Properties Group Date first assessed:  04/11/23  - Time first assessed: 1300  -MC Present on Hospital Admission: N  -MC Side: Right  -MC Location: fourth toe  -MC Primary Wound Type: Soft tissue  -MC    Row Name             Wound 03/15/23 1115 Left proximal leg Skin Tear    Wound - Properties Group Placement Date: 03/15/23  - Placement Time: 1115  -MC Present on Hospital Admission: Y  -MC Side: Left  -MC Orientation: proximal  -MC Location: leg  -MC Primary Wound Type: Skin tear  -MC    Retired Wound - Properties Group Placement Date: 03/15/23  - Placement Time: 1115  -MC Present on Hospital Admission: Y  -MC Side: Left  -MC Orientation: proximal  -MC Location: leg  -MC Primary Wound Type: Skin tear  -MC    Retired Wound - Properties Group Date first assessed: 03/15/23  - Time first assessed: 1115  -MC Present on Hospital Admission: Y  -MC Side: Left  -MC Location: leg  -MC Primary Wound Type: Skin tear  -MC    Row Name 04/26/23 1600          Wound 03/31/23 1345 Right medial heel Fissure    Wound - Properties Group Placement Date: 03/31/23  - Placement Time: 1345  - Side: Right  - Orientation: medial  - Location: heel  -JM Primary Wound Type: Fissure  -JM    Dressing Appearance intact;moist drainage  -MF     Base moist;pink;black eschar;yellow  -MF     Periwound intact;dry;redness  -MF     Periwound Temperature warm  -MF     Periwound Skin Turgor soft  -MF     Edges open  -MF     Drainage Characteristics/Odor serosanguineous  -MF     Drainage Amount scant  -MF     Care, Wound irrigated with;sterile normal saline;debrided  -MF     Dressing Care foam;low-adherent  therahoney with optifoam  -MF     Retired Wound - Properties Group Placement Date: 03/31/23  - Placement Time: 1345  -JM Side: Right  - Orientation: medial  - Location: heel  -JM Primary Wound Type: Fissure  -JM    Retired Wound - Properties Group Date first assessed: 03/31/23  - Time first assessed: 1345  - Side: Right  - Location: heel  -JM Primary Wound Type:  Fissure  -    Row Name 04/26/23 1600          Coping    Observed Emotional State cooperative  -     Verbalized Emotional State acceptance  -     Trust Relationship/Rapport care explained  -     Row Name 04/26/23 1600          Plan of Care Review    Plan of Care Reviewed With patient;daughter  -     Outcome Evaluation B heels stable with moderate nonviable tissue covering. PT will cont with light debridement and dressing management to help decrease bioburden and improve healing potential.  -     Row Name 04/26/23 1600          Positioning and Restraints    Pre-Treatment Position in bed  -     Post Treatment Position bed  -MF     In Bed supine;call light within reach  -           User Key  (r) = Recorded By, (t) = Taken By, (c) = Cosigned By    Initials Name Provider Type    MF Avtar Ayala, PT Physical Therapist    Lucila Mohan, PT Physical Therapist    Shi Mane, PT Physical Therapist    Aditi Shafer RN Registered Nurse              Physical Therapy Education     Title: PT OT SLP Therapies (In Progress)     Topic: Physical Therapy (In Progress)     Point: Mobility training (In Progress)     Learning Progress Summary           Patient Acceptance, E,D, NR by SD at 4/25/2023 1012    Acceptance, E,TB, NR by HM at 4/19/2023 1159    Acceptance, E, NR by KR at 4/18/2023 0824   Family Acceptance, E,D, NR by SD at 4/25/2023 1012    Acceptance, E, VU by KR at 4/19/2023 0818                   Point: Home exercise program (In Progress)     Learning Progress Summary           Patient Acceptance, E,D, NR by SD at 4/25/2023 1012    Acceptance, E, NR by KR at 4/18/2023 0824   Family Acceptance, E,D, NR by SD at 4/25/2023 1012    Acceptance, E, VU by KR at 4/19/2023 0818                   Point: Body mechanics (In Progress)     Learning Progress Summary           Patient Acceptance, E,D, NR by SD at 4/25/2023 1012    Acceptance, E, NR by AG at 4/24/2023 0228    Acceptance, E,TB, NR  by HM at 4/19/2023 1159    Acceptance, E, NR by KR at 4/18/2023 0824   Family Acceptance, E,D, NR by SD at 4/25/2023 1012    Acceptance, E, VU by KR at 4/19/2023 0818                   Point: Precautions (In Progress)     Learning Progress Summary           Patient Acceptance, E,D, NR by SD at 4/25/2023 1012    Acceptance, E,TB, NR by HM at 4/19/2023 1159    Acceptance, E, NR by KR at 4/18/2023 0824   Family Acceptance, E,D, NR by SD at 4/25/2023 1012    Acceptance, E, VU by KR at 4/19/2023 0818                               User Key     Initials Effective Dates Name Provider Type Discipline    SD 03/13/23 -  Johanny Larios, PT Physical Therapist PT     09/22/22 -  Anna White, PT Physical Therapist PT    KR 01/16/23 -  Cha Jefferson, RN Registered Nurse Nurse     03/10/23 -  Desiree Lyon, DEANN Registered Nurse Nurse                Recommendation and Plan  Anticipated Discharge Disposition (PT): skilled nursing facility  Planned Therapy Interventions (PT): balance training, bed mobility training, home exercise program, neuromuscular re-education, ROM (range of motion), strengthening, postural re-education, patient/family education, transfer training, wheelchair management/propulsion training  Therapy Frequency (PT): daily  Plan of Care Reviewed With: patient, daughter           Outcome Evaluation: B heels stable with moderate nonviable tissue covering. PT will cont with light debridement and dressing management to help decrease bioburden and improve healing potential.  Plan of Care Reviewed With: patient, daughter            Time Calculation   PT Charges     Row Name 04/26/23 1530             Time Calculation    Start Time 1530  -MF      PT Goal Re-Cert Due Date 04/29/23  -MF         Untimed Charges    13326-Ltwutysfi debridement 25  -MF         Total Minutes    Untimed Charges Total Minutes 25  -MF       Total Minutes 25  -MF            User Key  (r) = Recorded By, (t) = Taken By, (c) = Cosigned By     Initials Name Provider Type     Avtar Ayala, PT Physical Therapist                  Therapy Charges for Today     Code Description Service Date Service Provider Modifiers Qty    1939922 HC PIPE DEBRIDE OPEN WOUND UP TO 20CM 4/26/2023 Avtar Ayala, PT GP 1            PT G-Codes  Outcome Measure Options: AM-PAC 6 Clicks Basic Mobility (PT)  AM-PAC 6 Clicks Score (PT): 10  AM-PAC 6 Clicks Score (OT): 12       Avtar Ayala, PT  4/26/2023

## 2023-04-26 NOTE — PLAN OF CARE
Goal Outcome Evaluation:  Plan of Care Reviewed With: patient, daughter     Problem: Adult Inpatient Plan of Care  Goal: Plan of Care Review  Outcome: Ongoing, Progressing  Flowsheets (Taken 4/26/2023 1632)  Progress: no change    Problem: Adult Inpatient Plan of Care  Goal: Absence of Hospital-Acquired Illness or Injury  Outcome: Ongoing, Progressing  Intervention: Identify and Manage Fall Risk  Recent Flowsheet Documentation  Taken 4/26/2023 1600 by Larissa Pearl RN  Safety Promotion/Fall Prevention:   safety round/check completed   room organization consistent   fall prevention program maintained   clutter free environment maintained   assistive device/personal items within reach  Taken 4/26/2023 1400 by Larissa Pearl RN  Safety Promotion/Fall Prevention:   safety round/check completed   room organization consistent   fall prevention program maintained   assistive device/personal items within reach   clutter free environment maintained  Taken 4/26/2023 1230 by Larissa Pearl RN  Safety Promotion/Fall Prevention:   safety round/check completed   room organization consistent   fall prevention program maintained   clutter free environment maintained   assistive device/personal items within reach  Taken 4/26/2023 1028 by Larissa Pearl RN  Safety Promotion/Fall Prevention:   safety round/check completed   room organization consistent   fall prevention program maintained   clutter free environment maintained   assistive device/personal items within reach  Taken 4/26/2023 0840 by Larissa Pearl RN  Safety Promotion/Fall Prevention:   safety round/check completed   room organization consistent   nonskid shoes/slippers when out of bed   muscle strengthening facilitated   fall prevention program maintained   clutter free environment maintained   assistive device/personal items within reach   activity supervised  Intervention: Prevent Skin Injury  Recent Flowsheet Documentation  Taken 4/26/2023 1600  by Larissa Pearl RN  Body Position:   supine, legs elevated   weight shifting  Skin Protection:   adhesive use limited   incontinence pads utilized  Taken 4/26/2023 1400 by Larissa Pearl RN  Body Position:   side-lying   left  Skin Protection: adhesive use limited  Taken 4/26/2023 1230 by Larissa Pearl RN  Body Position:   neutral head position   neutral body alignment   weight shifting  Skin Protection:   adhesive use limited   incontinence pads utilized  Taken 4/26/2023 1105 by Larissa Pearl RN  Body Position:   turned   left   side-lying  Taken 4/26/2023 1028 by Larissa Pearl RN  Body Position: sitting up in bed  Taken 4/26/2023 0840 by Larissa Pearl RN  Body Position:   weight shifting   neutral body alignment   neutral head position  Skin Protection: adhesive use limited  Intervention: Prevent and Manage VTE (Venous Thromboembolism) Risk  Recent Flowsheet Documentation  Taken 4/26/2023 1600 by Larissa Pearl RN  Activity Management: activity encouraged  Taken 4/26/2023 1230 by Larissa Pearl RN  Activity Management: activity encouraged  Taken 4/26/2023 1028 by Larissa Pearl RN  Activity Management: sitting, edge of bed  Taken 4/26/2023 0840 by Larissa Pearl RN  Activity Management: activity encouraged  VTE Prevention/Management: (eliquis) --  Intervention: Prevent Infection  Recent Flowsheet Documentation  Taken 4/26/2023 0840 by Larissa Pearl RN  Infection Prevention:   rest/sleep promoted   hand hygiene promoted   environmental surveillance performed         Progress: no change  Outcome Evaluation: Patient continues on 3.5 L O2/NC. strict I/O with diuretics bid. last BM 4/25. poor po intake. c/o nausea today and yesterday relieved some with zofran. daughter at bedside. resting better this afternoon. will monitor.

## 2023-04-26 NOTE — PLAN OF CARE
Goal Outcome Evaluation:  Plan of Care Reviewed With: patient        Progress: no change  Outcome Evaluation: Patient restless at times. HOB elevated and on supplemental O2 3.5L via NC. Desaturates when HOB <30 degrees.

## 2023-04-27 LAB
ANION GAP SERPL CALCULATED.3IONS-SCNC: 15 MMOL/L (ref 5–15)
ATMOSPHERIC PRESS: ABNORMAL MM[HG]
BASE EXCESS BLDV CALC-SCNC: 10.2 MMOL/L (ref -2–2)
BDY SITE: ABNORMAL
BODY TEMPERATURE: 37 C
BUN SERPL-MCNC: 72 MG/DL (ref 8–23)
BUN/CREAT SERPL: 36.2 (ref 7–25)
CALCIUM SPEC-SCNC: 8.8 MG/DL (ref 8.6–10.5)
CHLORIDE SERPL-SCNC: 102 MMOL/L (ref 98–107)
CO2 BLDA-SCNC: 41 MMOL/L (ref 22–33)
CO2 SERPL-SCNC: 32 MMOL/L (ref 22–29)
COHGB MFR BLD: 1.7 %
CREAT SERPL-MCNC: 1.99 MG/DL (ref 0.57–1)
EGFRCR SERPLBLD CKD-EPI 2021: 24.4 ML/MIN/1.73
EPAP: 0
GLUCOSE BLDC GLUCOMTR-MCNC: 139 MG/DL (ref 70–130)
GLUCOSE BLDC GLUCOMTR-MCNC: 199 MG/DL (ref 70–130)
GLUCOSE BLDC GLUCOMTR-MCNC: 253 MG/DL (ref 70–130)
GLUCOSE SERPL-MCNC: 152 MG/DL (ref 65–99)
HCO3 BLDV-SCNC: 38.8 MMOL/L (ref 22–28)
HGB BLDA-MCNC: 12.3 G/DL (ref 14–18)
INHALED O2 CONCENTRATION: 40 %
IPAP: 0
Lab: ABNORMAL
METHGB BLD QL: 0.2 %
MODALITY: ABNORMAL
NOTE: ABNORMAL
NOTIFIED BY: ABNORMAL
NOTIFIED WHO: ABNORMAL
NT-PROBNP SERPL-MCNC: ABNORMAL PG/ML (ref 0–1800)
OXYHGB MFR BLDV: 71.2 %
PAW @ PEAK INSP FLOW SETTING VENT: 0 CMH2O
PCO2 BLDV: 72.2 MM HG (ref 41–51)
PH BLDV: 7.34 PH UNITS (ref 7.31–7.41)
PO2 BLDV: 41.7 MM HG (ref 27–53)
POTASSIUM SERPL-SCNC: 5 MMOL/L (ref 3.5–5.2)
SODIUM SERPL-SCNC: 149 MMOL/L (ref 136–145)
TOTAL RATE: 0 BREATHS/MINUTE

## 2023-04-27 PROCEDURE — 99231 SBSQ HOSP IP/OBS SF/LOW 25: CPT | Performed by: PHYSICIAN ASSISTANT

## 2023-04-27 PROCEDURE — 99232 SBSQ HOSP IP/OBS MODERATE 35: CPT | Performed by: INTERNAL MEDICINE

## 2023-04-27 PROCEDURE — 99233 SBSQ HOSP IP/OBS HIGH 50: CPT

## 2023-04-27 PROCEDURE — 80048 BASIC METABOLIC PNL TOTAL CA: CPT | Performed by: INTERNAL MEDICINE

## 2023-04-27 PROCEDURE — 83880 ASSAY OF NATRIURETIC PEPTIDE: CPT | Performed by: INTERNAL MEDICINE

## 2023-04-27 PROCEDURE — 99497 ADVNCD CARE PLAN 30 MIN: CPT | Performed by: INTERNAL MEDICINE

## 2023-04-27 PROCEDURE — 97535 SELF CARE MNGMENT TRAINING: CPT

## 2023-04-27 PROCEDURE — 82948 REAGENT STRIP/BLOOD GLUCOSE: CPT

## 2023-04-27 PROCEDURE — 82805 BLOOD GASES W/O2 SATURATION: CPT

## 2023-04-27 PROCEDURE — 63710000001 INSULIN LISPRO (HUMAN) PER 5 UNITS: Performed by: INTERNAL MEDICINE

## 2023-04-27 PROCEDURE — 82962 GLUCOSE BLOOD TEST: CPT

## 2023-04-27 RX ORDER — BUMETANIDE 1 MG/1
1 TABLET ORAL
Status: DISCONTINUED | OUTPATIENT
Start: 2023-04-27 | End: 2023-04-27

## 2023-04-27 RX ORDER — BUMETANIDE 1 MG/1
1 TABLET ORAL
Status: DISCONTINUED | OUTPATIENT
Start: 2023-04-27 | End: 2023-04-28 | Stop reason: HOSPADM

## 2023-04-27 RX ADMIN — POLYETHYLENE GLYCOL 3350 17 G: 17 POWDER, FOR SOLUTION ORAL at 10:11

## 2023-04-27 RX ADMIN — MINOCYCLINE HYDROCHLORIDE 100 MG: 50 CAPSULE ORAL at 20:57

## 2023-04-27 RX ADMIN — TRAMADOL HYDROCHLORIDE 25 MG: 50 TABLET, COATED ORAL at 20:57

## 2023-04-27 RX ADMIN — CLOPIDOGREL BISULFATE 75 MG: 75 TABLET ORAL at 09:58

## 2023-04-27 RX ADMIN — LOSARTAN POTASSIUM 50 MG: 50 TABLET, FILM COATED ORAL at 09:57

## 2023-04-27 RX ADMIN — INSULIN LISPRO 4 UNITS: 100 INJECTION, SOLUTION INTRAVENOUS; SUBCUTANEOUS at 13:59

## 2023-04-27 RX ADMIN — SERTRALINE HYDROCHLORIDE 50 MG: 50 TABLET ORAL at 09:58

## 2023-04-27 RX ADMIN — SENNOSIDES AND DOCUSATE SODIUM 2 TABLET: 8.6; 5 TABLET ORAL at 09:57

## 2023-04-27 RX ADMIN — NYSTATIN 500000 UNITS: 100000 SUSPENSION ORAL at 09:58

## 2023-04-27 RX ADMIN — NYSTATIN 500000 UNITS: 100000 SUSPENSION ORAL at 19:07

## 2023-04-27 RX ADMIN — NYSTATIN: 100000 POWDER TOPICAL at 10:11

## 2023-04-27 RX ADMIN — APIXABAN 2.5 MG: 2.5 TABLET, FILM COATED ORAL at 09:58

## 2023-04-27 RX ADMIN — APIXABAN 2.5 MG: 2.5 TABLET, FILM COATED ORAL at 20:58

## 2023-04-27 RX ADMIN — EMPAGLIFLOZIN 10 MG: 10 TABLET, FILM COATED ORAL at 09:58

## 2023-04-27 RX ADMIN — NYSTATIN: 100000 POWDER TOPICAL at 20:58

## 2023-04-27 RX ADMIN — Medication 10 ML: at 20:58

## 2023-04-27 RX ADMIN — ATORVASTATIN CALCIUM 40 MG: 40 TABLET, FILM COATED ORAL at 20:57

## 2023-04-27 RX ADMIN — SENNOSIDES AND DOCUSATE SODIUM 2 TABLET: 8.6; 5 TABLET ORAL at 20:58

## 2023-04-27 RX ADMIN — BUMETANIDE 2 MG: 0.25 INJECTION INTRAMUSCULAR; INTRAVENOUS at 09:58

## 2023-04-27 RX ADMIN — BUMETANIDE 1 MG: 1 TABLET ORAL at 20:58

## 2023-04-27 RX ADMIN — METOPROLOL SUCCINATE 25 MG: 25 TABLET, EXTENDED RELEASE ORAL at 09:58

## 2023-04-27 RX ADMIN — MINOCYCLINE HYDROCHLORIDE 100 MG: 50 CAPSULE ORAL at 09:58

## 2023-04-27 RX ADMIN — FAMOTIDINE 20 MG: 20 TABLET, FILM COATED ORAL at 09:58

## 2023-04-27 RX ADMIN — INSULIN LISPRO 2 UNITS: 100 INJECTION, SOLUTION INTRAVENOUS; SUBCUTANEOUS at 19:06

## 2023-04-27 NOTE — PROGRESS NOTES
CTS Progress Note      Chief Complaint: Bilateral nonhealing ulcerations      Subjective  No acute events overnight. Agreeable to Palliative care yesterday.    Objective    Physical Exam:   Vital Signs   Temp:  [95.2 °F (35.1 °C)-98.4 °F (36.9 °C)] 98.2 °F (36.8 °C)  Heart Rate:  [64-83] 73  Resp:  [16-22] 18  BP: (138-167)/(66-91) 167/78   GEN: NAD   CV:  Regular rate and rhythm.  Extremities warm without significant edema.  Bilateral toe ulcers are covered with bandage no surrounding erythema drainage.  Breathing is unlabored on 3.5 L nasal cannula with absent oxygen saturation   Results     Results from last 7 days   Lab Units 04/26/23  0357   WBC 10*3/mm3 7.51   HEMOGLOBIN g/dL 11.7*   HEMATOCRIT % 39.6   PLATELETS 10*3/mm3 267     Results from last 7 days   Lab Units 04/26/23  0357   SODIUM mmol/L 152*   POTASSIUM mmol/L 4.8   CHLORIDE mmol/L 105   CO2 mmol/L 33.0*   BUN mg/dL 70*   CREATININE mg/dL 1.73*   GLUCOSE mg/dL 191*   CALCIUM mg/dL 8.6         ABIs:  Right Conclusion: The right HOMER is unable to be assessed due to vessel incompressibility. Unable to assess digital ischemia.  Waveforms are difficult to assess due to A-fib and PVCs.  Reported palpable pulses.  •  Left Conclusion: The left HOMER is unable to be assessed due to vessel incompressibility.  Possible severe digital ischemia. Waveforms are difficult to assess due to A-fib and PVCs.  Reported palpable pulses.  •  Findings overall are inconclusive.  Alternative evaluation methods such as an arterial duplex ultrasound or CT angiogram with runoff could be considered if clinically warranted.    Assessment & Plan   Peripheral arterial disease with a history of left SFA angioplasty and stent on 9/28/2022.   Nonhealing bilateral heel ulcers      Plan   -HOMER results noted above, unable to obtain due to vessel and compressibility  -Continue wound care and IV/oral antibiotic therapy per infectious disease and wound care team  -Currently no plans from a  vascular standpoint for surgical intervention  -Follow-up with vascular surgery 1 to 2 weeks following discharge    Please obtain full leg segmental blood pressures and pulse volume recordings     Crystal Bassett PA-C  04/27/23  07:27 EDT      --    I reviewed this documentation. I interviewed and examined this patient this morning. Plan as documented.     Billy Colvin M.D., R.P.V.I.  Cardiothoracic and Vascular Surgeon  [facility]

## 2023-04-27 NOTE — CASE MANAGEMENT/SOCIAL WORK
Continued Stay Note  [facility]     Patient Name: Susan Anderson  MRN: [id number]  Today's Date: 4/27/2023    Admit Date: 4/17/2023    Plan: Home with hospice when medically ready and once family has secured caregivers   Discharge Plan     Row Name 04/27/23 1649       Plan    Plan Comments Palliative care and Hospice have met with the patients family and case management is following if needed.               Discharge Codes    No documentation.               Expected Discharge Date and Time     Expected Discharge Date Expected Discharge Time    May 1, 2023             AMELIA Andres

## 2023-04-27 NOTE — DISCHARGE PLACEMENT REQUEST
"Susan Anderson (83 y.o. Female)     Date of Birth   1940    Social Security Number       Address   [address] [place] [address]    Home Phone   [phone]    MRN   [id number]       Shinto   Mandaeism    Marital Status                               Admission Date   4/17/23    Admission Type   Emergency    Admitting Provider   Macy Hall MD    Attending Provider   Macy Hall MD    Department, Room/Bed   [address], S[address]/1       Discharge Date       Discharge Disposition       Discharge Destination                               Attending Provider: Macy Hall MD    Allergies: Vancomycin, Baclofen, Cephalexin, Erythromycin Base, Melatonin, Oxycodone, Protonix [Pantoprazole], Sulfa Antibiotics    Isolation: Contact   Infection: MRSA (07/27/22), COVID (History) (04/17/23), ESBL Klebsiella (04/24/23), Other (04/25/23)   Code Status: No CPR    Ht: 172 cm (67.72\")   Wt: 77.2 kg (170 lb 1.6 oz)    Admission Cmt: None   Principal Problem: Cellulitis of right foot due to methicillin-resistant Staphylococcus aureus [L03.115,B95.62]                 Active Insurance as of 4/17/2023     Primary Coverage     Payor Plan Insurance Group Employer/Plan Group    [facility] MEDICARE REPLACEMENT [facility] MEDICARE REPLACEMENT [id number]     Payor Plan Address Payor Plan Phone Number Payor Plan Fax Number Effective Dates    [address] [address]   1/1/2021 - None Entered    [facility] [address]       Subscriber Name Subscriber Birth Date Member ID       SUSAN ANDERSON 1940 [id number]                 Emergency Contacts      (Rel.) Home Phone Work Phone Mobile Phone    GREG ANDERSON (Daughter) [phone] -- [phone]    MARQUITA BARRIOS (Relative) -- -- [phone]            Emergency Contact Information     Name Relation Home Work Mobile    GREG ANDERSON Daughter [phone]    LISA BARRIOSE Relative   [phone]          Insurance Information             "    [facility] MEDICARE REPLACEMENT/[facility] MEDICARE REPLACEMENT Phone: --    Subscriber: Susan Anderson Subscriber#: [id number]    Group#: [id number] Precert#: --             History & Physical      Faiza Angulo MD at 23 1818              [facility]  HISTORY AND PHYSICAL    Patient Name: Susan Anderson  : 1940  MRN: [id number]  Primary Care Physician: Arleen Doherty MD    Subjective   Subjective     Chief Complaint: decreased alertness    HPI:  Susan Anderson is a 82 y.o. chronically ill female w/ HTN, HLD, CKD3, CAD, PAD, HFrEF (41-45%), PUD/GIB, pAfib, DM2, with 6 admissions since 2022 (predominantly for CHF). She was admitted 1/3- after PEA arrest requiring CPR after receiving propofol for EGD; and then COVID and acute renal failure requiring initiation of dialysis. Since then she has been battling bilateral heel wounds and been following with [facility] and Dr Finney and outpatient PT. She has had peripheral stent placed as well.  Over the last week, she was treated for a UTI with macrobid. She has had increasing redness near her wounds. And increasing sleepiness, shortness of breath and has a new oxygen requirement. She denies any chest pain, heaviness or tightness. Her primary complaint is sleepiness.      Review of Systems   Patient denies weight loss, headaches, changes in vision, fever, chills, sore throat, cough, sputum or hemoptysis, nausea or vomiting, diarrhea, abdominal pain or distension, change in urine output or habits, joint pain, rash, itching or bleeding.    Otherwise complete ROS is negative except as mentioned in the HPI.    Personal History     Past Medical History:   Diagnosis Date   • Anemia    • Anxiety    • Arthritis    • Asthma  - double pneumonia    Currently on inhaler and nebulizer   • Atrial fibrillation 2022   • Brain tumor     Benign and followed at    • Cancer     cervical cancer, skin  cancer   • CHF (congestive heart failure) 06/04/2021   • Chronic kidney disease Related to diabetes   • Clotting disorder 10/22    Upper GI bleed from blood thinners aggravate ulcers   • Coronary artery disease 6/4/2021 DX for hear failure   • COVID-19 01/28/2023   • Depression 8/22   • Diabetes mellitus 30 years    Seeing Dr. Lam 1st time Aug 19   • Dizziness 07/27/2022   • Effusion of right knee 08/12/2022   • Fall 07/27/2022   • GERD (gastroesophageal reflux disease)    • Gout    • History of medical problems 8/22    AFIB   • History of staph infection 10/2021    right toe   • History of transfusion     no reactions, 1 unit, [facility]   • HL (hearing loss)     Acoustic neuroma right   • Hx of colonoscopy    • Hyperlipidemia Reference current labs x 2-3yrs approx   • Hypertension 30 years   • Hypomagnesemia 10/06/2022   • Insomnia    • Low back pain    • Migraine    • Mitral valve disease    • Mitral valve disease    • Osteomyelitis    • Peptic ulceration 10/22    Secondary to blood thinners   • Peripheral neuropathy    • Physical deconditioning 05/17/2022   • Pneumonia due to infectious organism 06/04/2021   • Pressure injury of skin of sacral region 08/21/2022   • Renal insufficiency 2021   • Sepsis 01/28/2023   • Sleep apnea    • Syncope, unspecified syncope type 10/06/2022   • Type 2 diabetes mellitus     30 years   • Weakness 07/27/2022       Past Surgical History:   Procedure Laterality Date   • ABDOMINAL HYSTERECTOMY W/SALPINGECTOMY     • AMPUTATION  Right great toe, 1st 1/3 metatarsal -Reg 4/14/21   • AORTAGRAM N/A 04/09/2021    Procedure: AORTAGRAM WITH OR WITHOUT RUNOFFS, WITH Co2;  Surgeon: Trey Forrest MD;  Location:  [facility];  Service: Vascular;  Laterality: N/A;   • AORTAGRAM N/A 09/28/2022    Procedure: CO2 ANGIOGRAM, LEFT SFA ANGIOPLASTY WITH DRUG ELUTING BALLOON, LEFT SFA STENT PLACEMENT ;  Surgeon: Trey Forrest MD;  Location: [facility];  Service: Vascular;  Laterality: N/A;   "FLUORO: 13.12  DOSE 162mGy  CONTRAST: Isovue 350: 15ml   • APPENDECTOMY     • BRAIN TUMOR EXCISION      laser surgery    • CARDIAC CATHETERIZATION N/A 06/21/2021    Procedure: LEFT HEART CATH;  Surgeon: Anjum Ceballos MD;  Location:  [facility];  Service: Cardiology;  Laterality: N/A;   • CATARACT EXTRACTION W/ INTRAOCULAR LENS  IMPLANT, BILATERAL     • CHOLECYSTECTOMY     • COLONOSCOPY     • ENDOSCOPY N/A 10/07/2022    Procedure: ESOPHAGOGASTRODUODENOSCOPY;  Surgeon: Stef Veras MD;  Location:  [facility];  Service: Gastroenterology;  Laterality: N/A;   • EYE SURGERY     • FEMORAL ARTERY STENT  10/2022   • HYSTERECTOMY     • INTERVENTIONAL RADIOLOGY PROCEDURE N/A 1/15/2023    Procedure: CV INTERVENTIONAL RADIOLOGY PROCEDURE;  Surgeon: Sanket Zapata MD;  Location:  [facility];  Service: Interventional Radiology;  Laterality: N/A;   • TOE SURGERY     • TRANS METATARSAL AMPUTATION Right 04/14/2021    Procedure: AMPUTATION TRANS METATARSAL RIGHT GREAT TOE;  Surgeon: Valdez Finney MD;  Location:  [facility];  Service: Vascular;  Laterality: Right;       Family History: family history includes Coronary artery disease in her father; Diabetes in her father, maternal grandfather, maternal grandmother, mother, paternal grandfather, paternal grandmother, and sister; Heart attack in her father; Heart disease in her father.     Social History:  reports that she has never smoked. She has never used smokeless tobacco. She reports that she does not currently use alcohol. She reports that she does not use drugs.  She has been living at home with caretakers. Her daughter helps a lot as well.  Medications:  Lancets, Pen Needles 3/16\", acetaminophen, apixaban, atorvastatin, bumetanide, clopidogrel, clotrimazole-betamethasone, cyclobenzaprine, gabapentin, glucose blood, guaiFENesin, hydrOXYzine, insulin glargine, insulin lispro, losartan, metoprolol succinate XL, minocycline, multivitamin " with minerals, omeprazole, ondansetron ODT, polyethylene glycol, sennosides-docusate, sertraline, and traMADol    Allergies   Allergen Reactions   • Vancomycin Other (See Comments)     Acute Kidney Injury, requested by ID   • Baclofen Other (See Comments) and Hallucinations     PSYCHOSIS-POA REFUSES ADMINISTRATION OF THIS MED.   • Cephalexin Nausea Only   • Erythromycin Base Nausea Only   • Melatonin Other (See Comments)     Nightmares   • Oxycodone Nausea Only   • Protonix [Pantoprazole] Itching and Rash   • Sulfa Antibiotics Nausea Only       Objective   Objective     Vital Signs:   Temp:  [98 °F (36.7 °C)] 98 °F (36.7 °C)  Heart Rate:  [62-68] 68  Resp:  [16-26] 16  BP: (153-176)/() 153/86  Flow (L/min):  [3] 3    Constitutional: somnolent, hard of hearing, interactive and pleasant  Eyes: clear sclerae, no conjunctival injection  HENT: NCAT, mucous membranes dry  Neck: no masses or lymphadenopathy, trachea midline  Respiratory: distant breath sounds bilaterally, respirations increased  Cardiovascular: IRREG, no murmurs appreciated, palpable peripheral pulses  Abdomen:  soft, no HSM or masses palpable, not tender or distended  Musculoskeletal: both legs wrapped, chronic insufficency, , cool but not cold.  Neurologic: Oriented x 3,                       Strength symmetric in all extremities- generally weak                     Cranial Nerves grossly intact, speech clear-- slow but coherent  Skin: No rashes or jaundice  Psychiatric: Appropriate mood, insight    Result Review:  I have personally reviewed the results from the time of this admission   to 4/18/2023 00:35 EDT and agree with these findings:  [x]  Laboratory  [x]  Microbiology  [x]  Radiology  [x]  EKG/Telemetry   []  Cardiology/Vascular   []  Pathology  [x]  Old records  []  Other:  Most notable findings include: increased LDH, ddimer, potassium, troponin, but trending down, elevated BNP      LAB RESULTS:      Lab 04/17/23  1517   WBC  8.08   HEMOGLOBIN 11.7*   HEMATOCRIT 39.3   PLATELETS 236   NEUTROS ABS 6.11   IMMATURE GRANS (ABS) 0.51*   LYMPHS ABS 0.82   MONOS ABS 0.58   EOS ABS 0.04   .6*   CRP 8.02*   PROCALCITONIN 0.13   LACTATE 1.1   *   D DIMER QUANT 2.10*         Lab 04/17/23  1841 04/17/23  1517   SODIUM  --  142   POTASSIUM  --  5.7*   CHLORIDE  --  102   CO2  --  31.0*   ANION GAP  --  9.0   BUN  --  80*   CREATININE  --  1.63*   EGFR  --  31.0*   GLUCOSE  --  289*   CALCIUM  --  8.4*   MAGNESIUM  --  2.1   TSH 3.640  --          Lab 04/17/23  1517   TOTAL PROTEIN 5.9*   ALBUMIN 2.8*   GLOBULIN 3.1   ALT (SGPT) 22   AST (SGOT) 40*   BILIRUBIN 0.6   ALK PHOS 208*         Lab 04/17/23 2027 04/17/23  1841 04/17/23  1517   PROBNP  --   --  19,401.0*  18,084.0*   HSTROP T 310* 331* 326*             Lab 04/17/23  1517   FERRITIN 63.04         Lab 04/18/23  0024 04/17/23  1843   FIO2 21 28   CARBOXYHEMOGLOBIN (VENOUS) 1.7 1.5     Brief Urine Lab Results  (Last result in the past 365 days)      Color   Clarity   Blood   Leuk Est   Nitrite   Protein   CREAT   Urine HCG        04/17/23 1528 Yellow   Clear   Trace   Negative   Negative   >=300 mg/dL (3+)                   CT Chest Without Contrast Diagnostic    Result Date: 4/17/2023  CT CHEST WO CONTRAST DIAGNOSTIC Date of Exam: 4/17/2023 7:49 PM EDT Indication: Pneumonia, complication suspected, xray done. Comparison: 4/17/2023, 1/20/2023 Technique: Axial CT images were obtained of the chest without contrast administration.  Reconstructed coronal and sagittal images were also obtained. Automated exposure control and iterative construction methods were used. Findings: Hilum and Mediastinum: No pathologically enlarged lymph nodes. The heart appears enlarged. No pericardial effusion.  Unremarkable thoracic aorta and pulmonary arteries. Lung Parenchyma and Pleura: Large bilateral pleural effusions are present. Intralobular septal thickening is present. Atelectatic changes are  present within the bilateral lower lobes. No definite consolidation identified though evaluation is limited due to significant atelectasis. Atherosclerotic calcifications are present including within the pulmonary arteries. Granulomatous calcifications are present. Upper Abdomen: No acute process. Soft tissues: Unremarkable. Osseous structures: No aggressive focal lytic or sclerotic osseous lesions.     Impression: Impression: 1. At least moderate pulmonary edema pattern with large bilateral pleural effusions and bibasilar atelectasis. There is cardiomegaly. No definite consolidation identified though significant atelectatic changes are present. Superimposed pneumonia cannot be excluded. 2. Ancillary findings as described above. Electronically Signed: Karen Rey  4/17/2023 8:10 PM EDT  Workstation ID: [id number]    XR Chest 1 View    Result Date: 4/17/2023  XR CHEST 1 VW Date of Exam: 4/17/2023 3:46 PM EDT Indication: Weak/Dizzy/AMS triage protocol. Comparison: January 28, 2023 FINDINGS: There is a suspected moderate right and a small left pleural effusion. There are basilar predominant opacities with prominent pulmonary vasculature. The heart appears enlarged, as before. Right IJ catheter is no longer seen.  No pneumothorax is identified.     Impression: 1.Moderate right and small left pleural effusions. 2.Basilar predominant opacities with cardiomegaly and prominent pulmonary vasculature, suspicious for edema and atelectasis.  Electronically Signed: Billy Hadley  4/17/2023 4:11 PM EDT  Workstation ID: [id number]      Results for orders placed during the hospital encounter of 01/03/23    Adult Transthoracic Echo Complete W/ Cont if Necessary Per Protocol    Interpretation Summary  •  Left ventricular systolic function is moderately decreased. Left ventricular ejection fraction appears to be 41 - 45%.  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  •  Mild mitral  regurgitation.  •  Trace tricuspid regurgitation.  •  There is a moderate sized left pleural effusion.      The patient has started, but not completed, their COVID-19 vaccination series.    Assessment & Plan   Assessment / Plan       Cellulitis of right foot due to methicillin-resistant Staphylococcus aureus    Acute on chronic systolic CHF (congestive heart failure)    Acute respiratory failure with hypoxia and hypercarbia    Diabetic foot ulcer    PVD (peripheral vascular disease) ([facility])    Diabetes mellitus with neuropathy    Essential hypertension    Stage 3a chronic kidney disease    Mitral valve disease    NICM (nonischemic cardiomyopathy) ([facility])    Dyslipidemia    Chronic combined systolic and diastolic heart failure     Paroxysmal atrial fibrillation    T2DM       Assessment & Plan:  Susan Anderson is a 82 y.o. chronically ill female w/ HTN, HLD, CKD3, CAD, PAD, HFrEF (41-45%), PUD/GIB, pAfib, DM2, with 6 admissions since August 2022 (predominantly for CHF). She was admitted 1/3-1/20 after PEA arrest requiring CPR after receiving propofol for EGD; and then COVID and acute renal failure requiring initiation of dialysis. Since then she has been battling bilateral heel wounds and been following with [facility] and Dr Finney and outpatient PT. She has had peripheral stent placed as well.    Metabolic encephalopathy  -possible related to CO2 narcosis  -try BIPAP  -follow gas    A/C HFrEF- ED 40%  Bilateral pleural effusions  -diuresis with bumex  -monitor electrolytes  -hypekalmic today  --HO renal failure requiring dialysis  -troponin normalizing    Chronic bilateral heel wounds  -patient due to see Dr Finney  And Yanci, will consult  -WOC  -cont dapto for now  -consider further imaging    Deconditioned  -PT/OT    T2DM  -SSI    DVT prophylaxis:  Medical DVT prophylaxis orders are present.    CODE STATUS:  Code Status (Patient has no pulse and is not breathing): No CPR (Do Not Attempt to Resuscitate)  Medical  Interventions (Patient has pulse or is breathing): Full    Expected Discharge  Expected Discharge Date and Time     Expected Discharge Date Expected Discharge Time    Apr 21, 2023           Electronically signed by Faiza Angulo MD 04/18/23 00:35 EDT            Electronically signed by Faiza Angulo MD at 04/18/23 0036

## 2023-04-27 NOTE — PROGRESS NOTES
INFECTIOUS DISEASE Progress Note    Susan Anderson  1940  [id number]    Admission Date: 4/17/2023      Requesting Provider: No ref. provider found  Evaluating Physician: Stef Pete MD    Reason for Consultation: Bilateral foot infections    History of present illness:    4/18/23: Patient is a 83 y.o. female With type 2 diabetes mellitus, stage III chronic kidney disease, coronary artery disease, systolic/diastolic congestive heart failure, Severe peripheral vascular disease, recurrent bilateral foot infections, recent bilateral heel decubitus ulcers, a recent MRSA right toe wound infection, and recent Covid 19 in late January 2023 who is seen today after she presented with dyspnea and fatigue, and malaise. I saw her recently in the office with bilateral heel decubitus ulcers and a right fourth toe ulcer with associated cellulitis.  I initially started her on Augmentin therapy but then switched to minocycline after a right fourth toe culture returned positive for MRSA. I followed her during an admission from 1/3 until 1/20/23 for bibasilar pneumonia, Klebsiella/E. Coli UTI, and PEA arrest with acute hypoxic respiratory failure.  She suffered from acute renal failure requiring dialysis but her dialysis has subsequently been discontinued.  She was readmitted from 1/28 to 2/2/23 for Covid 19 infection for which she received Decadron but was not able to receive remdesivir due to bradycardia.  She was readmitted last night with dyspnea and acute hypoxic respiratory failure.  She has required BiPAP and is currently on 30% FiO2 with an O2 saturation of 98%.  She is lethargic and unable to provide any history. Chest CT scan revealed moderate to large bilateral pleural effusions and bibasilar atelectasis with cardiomegaly.  She is undergoing diuresis.She has been started on intravenous daptomycin.  She has remained afebrile.Her pro-BNP yesterday was 19,401. Her creatinine was 1.63.Her white blood cell count  was 8.1.Her creatinine today is 1.4 and her white blood cell count is 6.4.     4/19/23:She remains afebrile.She is on 3 L of oxygen with an O2 saturation of 93%. X-ray today reveals right greater than left effusions/opacities.  She is scheduled to undergo right thoracentesis today.  She has decreased dyspnea.  She has some nausea but denies vomiting.    4/20/23:She remains afebrile.  Her creatinine is 1.33. Her O2 saturation is 94% on 3 L. She underwent ultrasound-guided thoracentesis of the right pleural effusion yesterday yielding 1.35 L of pleural fluid. She had increased dyspnea immediately after her thoracentesis but this has dramatically improved.  Her chest x-ray shortly after thoracentesis revealed marked improvement but then she had evidence of bilateral congestion consistent with pulmonary edema. She transiently received Zosyn yesterday due to concerns over possible aspiration pneumonia but this has been discontinued by Dr. Felder.    4/21/23:She remains afebrile.Pleural fluid culture from 4/19 no growth so far. Her MRSA right foot isolate from 4/12 was sensitive to tetracycline and Bactrim.  She is currently on 3 L of oxygen with an O2 saturation of 96-99%.  She has decreased dyspnea.  She denies cough and sputum production.    4/22/23: Afebrile.  Pleural fluid culture negative from 4/19.  She is currently on 3L O2 with % O2 sat.  Her feet feel better.  She has no worsening shortness of breath.  She denies cough, n/v/d, rashes.     4/23/23: Afebrile.  Reviewed pictures from 4/22.  She remained on 3L O2 with % O2 sat.  Urine cultures from 4/22 with 25K GNR and UA WBC 6-12 with small LE/negative nitrite.  The culture was ordered d/t worsening nausea.  Per family in room, she has been confused today.     4/24/23:  Her creatinine today is 1.8.  White blood cell count is 8.5.  Urinalysis from 4/22 revealed 6-12 white blood cells. Urine culture is growing 25,000 colonies of ESBL Klebsiella.  She is  currently sedated.  She cannot provide a reliable ROS at present.    4/25/23: The nursing staff indicate that she has remained lethargic on Klonopin therapy. She is on 3.5 L of oxygen with an O2 saturation of 92%. She has remained afebrile.Her creatinine today is 1.74.  White blood cell count is 7.3.  She was more alert when I saw her this morning.  She denies increased dyspnea.    4/26/23:She has remained afebrile.  Her creatinine today is 1.73.  Her white blood cell count is 7.5. Respiratory virus panel PCR yesterday was positive for parainfluenza virus 3. Her chest x-ray yesterday morning revealed extensive and worsening bibasilar atelectasis/effusion persistent pulmonary vascular congestion. She complained of increased dyspnea earlier but later this morning she was improved.    4/27/23: She is on 3.5 L of oxygen with an O2 saturation of 95-99%. She has remained afebrile.  She had increased dyspnea earlier today but feels better at present.  She is eating poorly.        Past Medical History:   Diagnosis Date   • Anemia 8/22   • Anxiety    • Arthritis    • Asthma 6/4 - double pneumonia    Currently on inhaler and nebulizer   • Atrial fibrillation 08/21/2022   • Brain tumor     Benign and followed at    • Cancer     cervical cancer, skin cancer   • CHF (congestive heart failure) 06/04/2021   • Chronic kidney disease Related to diabetes   • Clotting disorder 10/22    Upper GI bleed from blood thinners aggravate ulcers   • Coronary artery disease 6/4/2021 DX for hear failure   • COVID-19 01/28/2023   • Depression 8/22   • Diabetes mellitus 30 years    Seeing Dr. Lam 1st time Aug 19   • Dizziness 07/27/2022   • Effusion of right knee 08/12/2022   • Fall 07/27/2022   • GERD (gastroesophageal reflux disease)    • Gout    • History of medical problems 8/22    AFIB   • History of staph infection 10/2021    right toe   • History of transfusion     no reactions, 1 unit, [facility]   • HL (hearing loss)     Acoustic neuroma  right   • Hx of colonoscopy    • Hyperlipidemia Reference current labs x 2-3yrs approx   • Hypertension 30 years   • Hypomagnesemia 10/06/2022   • Insomnia    • Low back pain    • Migraine    • Mitral valve disease    • Mitral valve disease    • Osteomyelitis    • Peptic ulceration 10/22    Secondary to blood thinners   • Peripheral neuropathy    • Physical deconditioning 05/17/2022   • Pneumonia due to infectious organism 06/04/2021   • Pressure injury of skin of sacral region 08/21/2022   • Renal insufficiency 2021   • Sepsis 01/28/2023   • Sleep apnea    • Syncope, unspecified syncope type 10/06/2022   • Type 2 diabetes mellitus     30 years   • Weakness 07/27/2022       Past Surgical History:   Procedure Laterality Date   • ABDOMINAL HYSTERECTOMY W/SALPINGECTOMY     • AMPUTATION  Right great toe, 1st 1/3 metatarsal -[place] 4/14/21   • AORTAGRAM N/A 04/09/2021    Procedure: AORTAGRAM WITH OR WITHOUT RUNOFFS, WITH Co2;  Surgeon: Trey Forrest MD;  Location:  [facility];  Service: Vascular;  Laterality: N/A;   • AORTAGRAM N/A 09/28/2022    Procedure: CO2 ANGIOGRAM, LEFT SFA ANGIOPLASTY WITH DRUG ELUTING BALLOON, LEFT SFA STENT PLACEMENT ;  Surgeon: Trey Forrest MD;  Location:  [facility];  Service: Vascular;  Laterality: N/A;  FLUORO: 13.12  DOSE 162mGy  CONTRAST: Isovue 350: 15ml   • APPENDECTOMY     • BRAIN TUMOR EXCISION      laser surgery    • CARDIAC CATHETERIZATION N/A 06/21/2021    Procedure: LEFT HEART CATH;  Surgeon: Anjum Ceballos MD;  Location:  [facility];  Service: Cardiology;  Laterality: N/A;   • CATARACT EXTRACTION W/ INTRAOCULAR LENS  IMPLANT, BILATERAL     • CHOLECYSTECTOMY     • COLONOSCOPY     • ENDOSCOPY N/A 10/07/2022    Procedure: ESOPHAGOGASTRODUODENOSCOPY;  Surgeon: Stef Veras MD;  Location:  [facility];  Service: Gastroenterology;  Laterality: N/A;   • EYE SURGERY     • FEMORAL ARTERY STENT  10/2022   • HYSTERECTOMY     • INTERVENTIONAL RADIOLOGY  PROCEDURE N/A 1/15/2023    Procedure: CV INTERVENTIONAL RADIOLOGY PROCEDURE;  Surgeon: Sanket Zapata MD;  Location:  [facility];  Service: Interventional Radiology;  Laterality: N/A;   • TOE SURGERY     • TRANS METATARSAL AMPUTATION Right 04/14/2021    Procedure: AMPUTATION TRANS METATARSAL RIGHT GREAT TOE;  Surgeon: Valdez Finney MD;  Location:  [facility];  Service: Vascular;  Laterality: Right;       Family History   Problem Relation Age of Onset   • Diabetes Mother         Type II   • Heart disease Father    • Heart attack Father    • Coronary artery disease Father    • Diabetes Father         Type II   • Diabetes Sister    • Diabetes Maternal Grandmother    • Diabetes Maternal Grandfather    • Diabetes Paternal Grandmother    • Diabetes Paternal Grandfather        Social History     Socioeconomic History   • Marital status:    • Number of children: 1   Tobacco Use   • Smoking status: Never   • Smokeless tobacco: Never   Vaping Use   • Vaping Use: Never used   Substance and Sexual Activity   • Alcohol use: Not Currently     Comment: Social drinking when not recovering from hospitalization   • Drug use: Never   • Sexual activity: Not Currently     Birth control/protection: None       Allergies   Allergen Reactions   • Vancomycin Other (See Comments)     Acute Kidney Injury, requested by ID   • Baclofen Other (See Comments) and Hallucinations     PSYCHOSIS-POA REFUSES ADMINISTRATION OF THIS MED.   • Cephalexin Nausea Only   • Erythromycin Base Nausea Only   • Melatonin Other (See Comments)     Nightmares   • Oxycodone Nausea Only   • Protonix [Pantoprazole] Itching and Rash   • Sulfa Antibiotics Nausea Only         Medication:    Current Facility-Administered Medications:   •  apixaban (ELIQUIS) tablet 2.5 mg, 2.5 mg, Oral, Q12H, Breezy Felder MD, 2.5 mg at 04/26/23 2049  •  atorvastatin (LIPITOR) tablet 40 mg, 40 mg, Oral, Nightly, Faiza Angulo MD, 40 mg at 04/26/23 2049  •   sennosides-docusate (PERICOLACE) 8.6-50 MG per tablet 2 tablet, 2 tablet, Oral, BID, 2 tablet at 04/26/23 2049 **AND** polyethylene glycol (MIRALAX) packet 17 g, 17 g, Oral, Daily PRN **AND** bisacodyl (DULCOLAX) EC tablet 5 mg, 5 mg, Oral, Daily PRN **AND** bisacodyl (DULCOLAX) suppository 10 mg, 10 mg, Rectal, Daily PRN, Faiza Angulo MD  •  bumetanide (BUMEX) injection 2 mg, 2 mg, Intravenous, BID, Macy Hall MD, 2 mg at 04/26/23 2049  •  clopidogrel (PLAVIX) tablet 75 mg, 75 mg, Oral, Daily, Faiza Angulo MD, 75 mg at 04/26/23 0839  •  empagliflozin (JARDIANCE) tablet 10 mg, 10 mg, Oral, Daily, Anjum Ceballos MD, 10 mg at 04/26/23 0839  •  famotidine (PEPCID) tablet 20 mg, 20 mg, Oral, Daily, Breezy Felder MD, 20 mg at 04/26/23 0839  •  Insulin Lispro (humaLOG) injection 0-7 Units, 0-7 Units, Subcutaneous, TID With Meals, Faiza Angulo MD, 2 Units at 04/26/23 1735  •  losartan (COZAAR) tablet 50 mg, 50 mg, Oral, Q24H, Anjum Ceballos MD, 50 mg at 04/26/23 0838  •  metoprolol succinate XL (TOPROL-XL) 24 hr tablet 25 mg, 25 mg, Oral, Q24H, Anjum Ceballos MD, 25 mg at 04/26/23 0838  •  minocycline (MINOCIN,DYNACIN) capsule 100 mg, 100 mg, Oral, Q12H, Stef Pete MD, 100 mg at 04/26/23 2052  •  nitroglycerin (NITROSTAT) ointment 0.5 inch, 0.5 inch, Topical, Q6H PRN, Faiza Angulo MD  •  nystatin (MYCOSTATIN) 100,000 unit/mL suspension 500,000 Units, 5 mL, Swish & Swallow, 4x Daily, Breezy Felder MD, 500,000 Units at 04/26/23 2049  •  nystatin (MYCOSTATIN) powder, , Topical, Q12H, Breezy Felder MD, Given at 04/26/23 2050  •  ondansetron (ZOFRAN) injection 4 mg, 4 mg, Intravenous, Q6H PRN, Breezy Felder MD, 4 mg at 04/26/23 0905  •  Pharmacy Consult - Pharmacy to dose, , Does not apply, Continuous PRN, Faiza Angulo MD  •  polyethylene glycol (MIRALAX) packet 17 g, 17 g, Oral, Daily, Faiza Angulo MD, 17 g at 04/24/23 0945  •  prochlorperazine (COMPAZINE) injection 5 mg, 5  mg, Intravenous, Q6H PRN, 5 mg at 23 0915 **OR** prochlorperazine (COMPAZINE) tablet 5 mg, 5 mg, Oral, Q6H PRN **OR** prochlorperazine (COMPAZINE) suppository 25 mg, 25 mg, Rectal, Q12H PRN, Breezy Felder MD  •  sertraline (ZOLOFT) tablet 50 mg, 50 mg, Oral, Daily, Faiza Angulo MD, 50 mg at 23 0839  •  sodium chloride 0.9 % flush 10 mL, 10 mL, Intravenous, PRN, Faiza Angulo MD, 10 mL at 23 2101  •  traMADol (ULTRAM) tablet 25 mg, 25 mg, Oral, Q12H PRN, Faiza Angulo MD, 25 mg at 23 0937    Antibiotics:  Anti-Infectives (From admission, onward)    Ordered     Dose/Rate Route Frequency Start Stop    23 1700  minocycline (MINOCIN,DYNACIN) capsule 100 mg        Ordering Provider: Stef Pete MD    100 mg Oral Every 12 Hours Scheduled 23 2100 23 2059    23 1545  piperacillin-tazobactam (ZOSYN) 3.375 g in iso-osmotic dextrose 50 ml (premix)        Ordering Provider: Breezy Felder MD    3.375 g  over 30 Minutes Intravenous Once 23 1645 23 1752    23 1542  DAPTOmycin (CUBICIN) 300 mg in sodium chloride 0.9 % 50 mL IVPB        Note to Pharmacy: MRSA w/ restriction from Vanco per ID.   Ordering Provider: Kings Herrera MD    300 mg  100 mL/hr over 30 Minutes Intravenous Once 23 1600 23 1801            Review of Systems:  See HPI    Physical Exam:   Vital Signs  Temp (24hrs), Av.1 °F (36.2 °C), Min:95.2 °F (35.1 °C), Max:98.4 °F (36.9 °C)    Temp  Min: 95.2 °F (35.1 °C)  Max: 98.4 °F (36.9 °C)  BP  Min: 138/66  Max: 167/78  Pulse  Min: 64  Max: 83  Resp  Min: 16  Max: 22  SpO2  Min: 95 %  Max: 100 %    GENERAL: Severely debilitated appearing  HEENT: No labial ulcers    NECK: Supple   LYMPH: No cervical, axillary or inguinal lymphadenopathy.  HEART: RRR; No murmur, rubs, gallops.   LUNGS: .Decreased breath sounds at the right base with scattered rales.  ABDOMEN: Soft, nontender, nondistended.   EXT:  1+ bilateral  lower extremity edema.  She has bilateral 1.5 cm heel pressure ulcers.  The lateral right fourth toe ulcer has decreased  surrounding erythema present.  MSK: No joint effusions or erythema  SKIN: No diffuse rash present.    NEURO: Alert and responsive today.  She moves all of her extremities.                    Laboratory Data    Results from last 7 days   Lab Units 04/26/23  0357 04/25/23  0305 04/24/23  0501   WBC 10*3/mm3 7.51 7.25 8.45   HEMOGLOBIN g/dL 11.7* 10.6* 10.8*   HEMATOCRIT % 39.6 37.1 37.9   PLATELETS 10*3/mm3 267 234 233     Results from last 7 days   Lab Units 04/26/23  0357   SODIUM mmol/L 152*   POTASSIUM mmol/L 4.8   CHLORIDE mmol/L 105   CO2 mmol/L 33.0*   BUN mg/dL 70*   CREATININE mg/dL 1.73*   GLUCOSE mg/dL 191*   CALCIUM mg/dL 8.6                     Results from last 7 days   Lab Units 04/24/23  0501   CK TOTAL U/L 44         Estimated Creatinine Clearance: 26.3 mL/min (A) (by C-G formula based on SCr of 1.73 mg/dL (H)).      Microbiology:  Microbiology Results (last 10 days)     Procedure Component Value - Date/Time    Respiratory Panel PCR w/COVID-19(SARS-CoV-2) [facility] In-House, NP Swab in UTM/VTM, 3-4 HR TAT - Swab, Nasopharynx [[id number]]  (Abnormal) Collected: 04/25/23 1142    Lab Status: Final result Specimen: Swab from Nasopharynx Updated: 04/25/23 1349     ADENOVIRUS, PCR Not Detected     Coronavirus 229E Not Detected     Coronavirus HKU1 Not Detected     Coronavirus NL63 Not Detected     Coronavirus OC43 Not Detected     COVID19 Not Detected     Human Metapneumovirus Not Detected     Human Rhinovirus/Enterovirus Not Detected     Influenza A PCR Not Detected     Influenza B PCR Not Detected     Parainfluenza Virus 1 Not Detected     Parainfluenza Virus 2 Not Detected     Parainfluenza Virus 3 Detected     Parainfluenza Virus 4 Not Detected     RSV, PCR Not Detected     Bordetella pertussis pcr Not Detected     Bordetella parapertussis PCR Not Detected      Chlamydophila pneumoniae PCR Not Detected     Mycoplasma pneumo by PCR Not Detected    Narrative:      In the setting of a positive respiratory panel with a viral infection PLUS a negative procalcitonin without other underlying concern for bacterial infection, consider observing off antibiotics or discontinuation of antibiotics and continue supportive care. If the respiratory panel is positive for atypical bacterial infection (Bordetella pertussis, Chlamydophila pneumoniae, or Mycoplasma pneumoniae), consider antibiotic de-escalation to target atypical bacterial infection.    Urine Culture - Urine, Urine, Clean Catch [[id number]]  (Abnormal)  (Susceptibility) Collected: 04/22/23 1044    Lab Status: Final result Specimen: Urine, Clean Catch Updated: 04/24/23 0729     Urine Culture 25,000 CFU/mL Klebsiella pneumoniae ESBL     Comment:   Consider infectious disease consult.  Susceptibility results may not correlate to clinical outcomes.       Narrative:      Colonization of the urinary tract without infection is common. Treatment is discouraged unless the patient is symptomatic, pregnant, or undergoing an invasive urologic procedure.  Recent outcomes data supports the use of pip/tazo in the treatment of susceptible ESBL infections for uncomplicated UTI. Consider use of pip/tazo as a carbapenem-sparing regimen in applicable patients.    Susceptibility      Klebsiella pneumoniae ESBL      NAIMA      Ertapenem Susceptible      Gentamicin Susceptible      Levofloxacin Intermediate      Meropenem Susceptible      Nitrofurantoin Intermediate      Piperacillin + Tazobactam Resistant     Trimethoprim + Sulfamethoxazole Susceptible                           Body Fluid Culture - Body Fluid, Pleural Cavity [[id number]] Collected: 04/19/23 1220    Lab Status: Final result Specimen: Body Fluid from Pleural Cavity Updated: 04/22/23 0819     Body Fluid Culture No growth at 3 days     Gram Stain Moderate (3+) WBCs seen      No organisms  seen    Anaerobic Culture - Pleural Fluid, Pleural Cavity [[id number]]  (Normal) Collected: 04/19/23 1220    Lab Status: Final result Specimen: Pleural Fluid from Pleural Cavity Updated: 04/24/23 0648     Anaerobic Culture No anaerobes isolated at 5 days    Blood Culture - Blood, Arm, Left [[id number]]  (Normal) Collected: 04/17/23 1600    Lab Status: Final result Specimen: Blood from Arm, Left Updated: 04/22/23 1700     Blood Culture No growth at 5 days    Blood Culture - Blood, Arm, Right [[id number]]  (Normal) Collected: 04/17/23 1553    Lab Status: Final result Specimen: Blood from Arm, Right Updated: 04/22/23 1700     Blood Culture No growth at 5 days        Culture results from last 30 days   Lab 04/19/23  1220 04/12/23  0854   WOUNDCX  --  Moderate growth (3+) Staphylococcus aureus, MRSA*   ANACX No anaerobes isolated at 5 days  --            Radiology:  Imaging Results (Last 72 Hours)     Procedure Component Value Units Date/Time    XR Chest 1 View [[id number]] Collected: 04/25/23 0857     Updated: 04/25/23 0902    Narrative:      XR CHEST 1 VW    Date of Exam: 4/25/2023 5:35 AM EDT    Indication: DYSPNEA, ON EXERTION  dyspnea    Comparison: 4/20/2023    Findings:  There is gradually worsening opacity of both lower and mid lungs, apparently increasing pleural effusion and atelectasis, now with only the upper lungs remaining well aerated. Heart shadow is partly obscured but appears enlarged. There appears to be   persistent perihilar edema.      Impression:      Impression:  Extensive and worsening bibasilar atelectasis and effusion, and persistent pulmonary vascular congestion.      Electronically Signed: Breezy Rey    4/25/2023 8:59 AM EDT    Workstation ID: [id number]      I read her radiographic studies.      Impression:   1.  MRSA right foot wound infection-with cellulitis of the right fourth toe and bilateral decubitus heel ulcers.  This has occurred in the setting of severe peripheral vascular disease and  severe debility.  These will be very difficult to heal.  She is S/P daptomycin therapy.  Her cellulitis has improved.  She is on oral minocycline.  2.  Pulmonary edema  3.  Acute hypoxic respiratory failure-secondary to pulmonary edema/CHF  4.  Acute on chronic systolic/diastolic heart failure-with an ejection fraction of 36-40% by echocardiogram on 4/19/23.  5.  Paroxysmal atrial fibrillation  6.  History of paroxysmal atrial fibrillation  7.  Severe peripheral vascular disease  8.  Severe debility  9.  Type 2 diabetes mellitus-this places her at increased risk for recurrent infections  10.  Stage III chronic kidney disease  11.  Recent Covid 19 infection-1/28/23.  This has contributed to her debility  12.  GNR urinary colonization  13.  Encephalopathy--toxic vs metabolic vs other.   14.  Parainfluenza virus 3 URI    PLAN/RECOMMENDATIONS:   1.  Diuretic therapy per the hospitalist service  2.  Minocycline 100 mg p.o. twice daily x10 days  3.  Discharge soon to a subacute nursing facility    Her overall prognosis remains poor.  I discussed her situation again with her daughter again today.  Hospice would be appropriate if she deteriorates further.  I discussed her complex situation with Dr. Hall today.  I will be out until Monday.       Stef Pete MD  4/27/2023  07:32 EDT

## 2023-04-27 NOTE — THERAPY TREATMENT NOTE
Patient Name: Susan Anderson  : 1940    MRN: [id number]                              Today's Date: 2023       Admit Date: 2023    Visit Dx:     ICD-10-CM ICD-9-CM   1. Cellulitis of right foot due to methicillin-resistant Staphylococcus aureus  L03.115 682.7    B95.62 041.12   2. Diabetic ulcer of heel associated with type 2 diabetes mellitus, unspecified laterality, unspecified ulcer stage  E11.621 250.80    L97.409 707.14   3. Stage 3b chronic kidney disease  N18.32 585.3   4. Diabetes mellitus type 2 in obese  E11.69 250.00    E66.9 278.00   5. Elevated troponin  R77.8 790.6   6. Elevated blood pressure reading with diagnosis of hypertension  I10 401.9     Patient Active Problem List   Diagnosis   • Diabetic foot ulcer   • PVD (peripheral vascular disease) ([facility])   • Osteomyelitis   • Diabetes mellitus with neuropathy   • Essential hypertension   • Stage 3a chronic kidney disease   • Acute on chronic systolic CHF (congestive heart failure)   • Mitral valve disease   • NICM (nonischemic cardiomyopathy) ([facility])   • CAD   • Dyslipidemia   • Chronic combined systolic and diastolic heart failure    • Lumbar stenosis with neurogenic claudication   • Spondylolisthesis, lumbar region   • Gait disturbance   • Anemia   • Sleep apnea   • Paroxysmal atrial fibrillation   • T2DM    • Iron deficiency anemia, unspecified   • Cellulitis of right foot due to methicillin-resistant Staphylococcus aureus   • Acute respiratory failure with hypoxia and hypercarbia   • Severe Malnutrition ([facility])     Past Medical History:   Diagnosis Date   • Anemia    • Anxiety    • Arthritis    • Asthma  - double pneumonia    Currently on inhaler and nebulizer   • Atrial fibrillation 2022   • Brain tumor     Benign and followed at    • Cancer     cervical cancer, skin cancer   • CHF (congestive heart failure) 2021   • Chronic kidney disease Related to diabetes   • Clotting disorder 10/22    Upper GI bleed from blood  thinners aggravate ulcers   • Coronary artery disease 6/4/2021 DX for hear failure   • COVID-19 01/28/2023   • Depression 8/22   • Diabetes mellitus 30 years    Seeing Dr. Lam 1st time Aug 19   • Dizziness 07/27/2022   • Effusion of right knee 08/12/2022   • Fall 07/27/2022   • GERD (gastroesophageal reflux disease)    • Gout    • History of medical problems 8/22    AFIB   • History of staph infection 10/2021    right toe   • History of transfusion     no reactions, 1 unit, [facility]   • HL (hearing loss)     Acoustic neuroma right   • Hx of colonoscopy    • Hyperlipidemia Reference current labs x 2-3yrs approx   • Hypertension 30 years   • Hypomagnesemia 10/06/2022   • Insomnia    • Low back pain    • Migraine    • Mitral valve disease    • Mitral valve disease    • Osteomyelitis    • Peptic ulceration 10/22    Secondary to blood thinners   • Peripheral neuropathy    • Physical deconditioning 05/17/2022   • Pneumonia due to infectious organism 06/04/2021   • Pressure injury of skin of sacral region 08/21/2022   • Renal insufficiency 2021   • Sepsis 01/28/2023   • Sleep apnea    • Syncope, unspecified syncope type 10/06/2022   • Type 2 diabetes mellitus     30 years   • Weakness 07/27/2022     Past Surgical History:   Procedure Laterality Date   • ABDOMINAL HYSTERECTOMY W/SALPINGECTOMY     • AMPUTATION  Right great toe, 1st 1/3 metatarsal -Reg 4/14/21   • AORTAGRAM N/A 04/09/2021    Procedure: AORTAGRAM WITH OR WITHOUT RUNOFFS, WITH Co2;  Surgeon: Trey Forrest MD;  Location: [facility];  Service: Vascular;  Laterality: N/A;   • AORTAGRAM N/A 09/28/2022    Procedure: CO2 ANGIOGRAM, LEFT SFA ANGIOPLASTY WITH DRUG ELUTING BALLOON, LEFT SFA STENT PLACEMENT ;  Surgeon: Trey Forrest MD;  Location: [facility] [place];  Service: Vascular;  Laterality: N/A;  FLUORO: 13.12  DOSE 162mGy  CONTRAST: Isovue 350: 15ml   • APPENDECTOMY     • BRAIN TUMOR EXCISION      laser surgery    • CARDIAC CATHETERIZATION N/A  06/21/2021    Procedure: LEFT HEART CATH;  Surgeon: Anjum Ceballos MD;  Location:  [facility];  Service: Cardiology;  Laterality: N/A;   • CATARACT EXTRACTION W/ INTRAOCULAR LENS  IMPLANT, BILATERAL     • CHOLECYSTECTOMY     • COLONOSCOPY     • ENDOSCOPY N/A 10/07/2022    Procedure: ESOPHAGOGASTRODUODENOSCOPY;  Surgeon: Stef Veras MD;  Location:  [facility];  Service: Gastroenterology;  Laterality: N/A;   • EYE SURGERY     • FEMORAL ARTERY STENT  10/2022   • HYSTERECTOMY     • INTERVENTIONAL RADIOLOGY PROCEDURE N/A 1/15/2023    Procedure: CV INTERVENTIONAL RADIOLOGY PROCEDURE;  Surgeon: Sanket Zapata MD;  Location:  [facility];  Service: Interventional Radiology;  Laterality: N/A;   • TOE SURGERY     • TRANS METATARSAL AMPUTATION Right 04/14/2021    Procedure: AMPUTATION TRANS METATARSAL RIGHT GREAT TOE;  Surgeon: Valdez Finney MD;  Location:  [facility];  Service: Vascular;  Laterality: Right;      General Information     Row Name 04/27/23 1524          OT Time and Intention    Document Type therapy note (daily note)  -AC (r) CH (t) AC (c)     Mode of Treatment occupational therapy  -AC (r) CH (t) AC (c)     Row Name 04/27/23 1524          General Information    Patient Profile Reviewed yes  -AC (r) CH (t) AC (c)     Existing Precautions/Restrictions fall;oxygen therapy device and L/min;other (see comments)  bilat heel wounds, decreased skin integrity  -AC (r) CH (t) AC (c)     Row Name 04/27/23 1524          Occupational Profile    Reason for Services/Referral (Occupational Profile) occupational decline  -AC (r) CH (t) AC (c)     Row Name 04/27/23 1524          Cognition    Orientation Status (Cognition) oriented x 3;verbal cues/prompts needed for orientation;time  pt. knew the month but thought the year was 2022  -AC (r) CH (t) AC (c)     Row Name 04/27/23 1524          Safety Issues, Functional Mobility    Safety Issues Affecting Function (Mobility) friction/shear  risk;insight into deficits/self-awareness;judgment;problem-solving;safety precaution awareness  -AC (r) CH (t) AC (c)     Impairments Affecting Function (Mobility) balance;endurance/activity tolerance;pain;postural/trunk control;shortness of breath;strength;cognition  -AC (r) CH (t) AC (c)     Cognitive Impairments, Mobility Safety/Performance insight into deficits/self-awareness;judgment;problem-solving/reasoning;safety precaution awareness;safety precaution follow-through;sequencing abilities  -AC (r) CH (t) AC (c)           User Key  (r) = Recorded By, (t) = Taken By, (c) = Cosigned By    Initials Name Provider Type    AC Isabel Moon, OT Occupational Therapist    CH Savannah Gallegos, OT Student OT Student                 Mobility/ADL's     Row Name 04/27/23 1527          Bed Mobility    Bed Mobility sidelying-sit;scooting/bridging  -AC (r) CH (t) AC (c)     Scooting/Bridging [place] (Bed Mobility) moderate assist (50% patient effort);2 person assist;verbal cues  scooting to EOB  -AC (r) CH (t) AC (c)     Sidelying-Sit [place] (Bed Mobility) verbal cues;nonverbal cues (demo/gesture);minimum assist (75% patient effort)  -AC (r) CH (t) AC (c)     Bed Mobility, Safety Issues decreased use of arms for pushing/pulling;decreased use of legs for bridging/pushing  -AC (r) CH (t) AC (c)     Assistive Device (Bed Mobility) bed rails;draw sheet;head of bed elevated  -AC (r) CH (t) AC (c)     Comment, (Bed Mobility) Extra time req'd. Pt. able to get legs out of bed but re'd A to manage trunk, and position hips to EOB  -AC (r) CH (t) AC (c)     Row Name 04/27/23 1527          Functional Mobility    Functional Mobility- Ind. Level not tested  -AC (r) CH (t) AC (c)     Row Name 04/27/23 1527          Activities of Daily Living    BADL Assessment/Intervention upper body dressing;grooming  -AC (r) CH (t) AC (c)     Row Name 04/27/23 1527          Grooming Assessment/Training    [place] Level (Grooming) minimum  assist (75% patient effort);hair care, combing/brushing;standby assist;oral care regimen;wash face, hands  -AC (r) CH (t) AC (c)     Position (Grooming) unsupported sitting;edge of bed sitting  -AC (r) CH (t) AC (c)     Comment, (Grooming) Pt. req'd extra time to complete grooming tasks  -AC (r) CH (t) AC (c)     Row Name 04/27/23 1527          Upper Body Dressing Assessment/Training    [place] Level (Upper Body Dressing) minimum assist (75% patient effort);don;front opening garment  -AC (r) CH (t) AC (c)     Position (Upper Body Dressing) unsupported standing;edge of bed sitting  -AC (r) CH (t) AC (c)     Comment, (Upper Body Dressing) Pt. able to thread BUE, req'd A to manage ties  -AC (r) CH (t) AC (c)           User Key  (r) = Recorded By, (t) = Taken By, (c) = Cosigned By    Initials Name Provider Type    Isabel Osorio, OT Occupational Therapist    Savannah Ford, OT Student OT Student               Obj/Interventions     Row Name 04/27/23 1531          Balance    Balance Assessment sitting static balance;sitting dynamic balance  -AC (r) CH (t) AC (c)     Static Sitting Balance standby assist  -AC (r) CH (t) AC (c)     Dynamic Sitting Balance standby assist;verbal cues  -AC (r) CH (t) AC (c)     Position, Sitting Balance unsupported;sitting edge of bed  -AC (r) CH (t) AC (c)     Balance Interventions sitting;static;dynamic;occupation based/functional task  -AC (r) CH (t) AC (c)           User Key  (r) = Recorded By, (t) = Taken By, (c) = Cosigned By    Initials Name Provider Type    Isabel Osorio, OT Occupational Therapist    Savannah Ford, OT Student OT Student               Goals/Plan    No documentation.                Clinical Impression     Row Name 04/27/23 1532          Pain Scale: FACES Pre/Post-Treatment    Pain: FACES Scale, Pretreatment 4-->hurts little more  -AC (r) CH (t) AC (c)     Posttreatment Pain Rating 4-->hurts little more  -AC (r) CH (t) AC (c)     Pain Location -  Side/Orientation Bilateral  -AC (r) CH (t) AC (c)     Pain Location posterior  -AC (r) CH (t) AC (c)     Pain Location - ankle  -AC (r) CH (t) AC (c)     Row Name 04/27/23 1532          Plan of Care Review    Progress improving  -AC (r) CH (t) AC (c)     Outcome Evaluation Pt. franko good initiation of bed mobility and self-care tasks during OT tx session. Pt. did req. extra time to complete bed mobility and self care task this date. Pt. saji'd improvement w/ static and dynamic sitting balance. Pt. is below baseline for self-care and BADLs d/t decreased endurance and decreased activity tolerance. Pt. would benefit from skilled OT to address these deficits and promote IND needed to complete fxal tasks. OT recommends SNF at d/c.  -AC (r) CH (t) AC (c)     Row Name 04/27/23 1532          Vital Signs    Pre Systolic BP Rehab 147  -AC (r) CH (t) AC (c)     Pre Treatment Diastolic BP 76  -AC (r) CH (t) AC (c)     Pretreatment Heart Rate (beats/min) 89  -AC (r) CH (t) AC (c)     Intratreatment Heart Rate (beats/min) 83  -AC (r) CH (t) AC (c)     Posttreatment Heart Rate (beats/min) 76  -AC (r) CH (t) AC (c)     Pre SpO2 (%) 100  -AC (r) CH (t) AC (c)     O2 Delivery Pre Treatment nasal cannula  -AC (r) CH (t) AC (c)     Intra SpO2 (%) 97  -AC (r) CH (t) AC (c)     O2 Delivery Intra Treatment nasal cannula  -AC (r) CH (t) AC (c)     Post SpO2 (%) 100  -AC (r) CH (t) AC (c)     O2 Delivery Post Treatment nasal cannula  -AC (r) CH (t) AC (c)     Pre Patient Position Side Lying  -AC (r) CH (t) AC (c)     Intra Patient Position Sitting  -AC (r) CH (t) AC (c)     Post Patient Position Sitting  -AC (r) CH (t) AC (c)     Row Name 04/27/23 4382          Positioning and Restraints    Pre-Treatment Position in bed  -AC (r) CH (t) AC (c)     Post Treatment Position bed  -AC (r) CH (t) AC (c)     In Bed notified nsg;sitting EOB;call light within reach;encouraged to call for assist;exit alarm on;with family/caregiver;side rails up x2   -AC (r) CH (t) AC (c)           User Key  (r) = Recorded By, (t) = Taken By, (c) = Cosigned By    Initials Name Provider Type    Isabel Osorio, OT Occupational Therapist    Savannah Ford, OT Student OT Student               Outcome Measures     Row Name 04/27/23 1538          How much help from another is currently needed...    Putting on and taking off regular lower body clothing? 1  -AC (r) CH (t) AC (c)     Bathing (including washing, rinsing, and drying) 2  -AC (r) CH (t) AC (c)     Toileting (which includes using toilet bed pan or urinal) 1  -AC (r) CH (t) AC (c)     Putting on and taking off regular upper body clothing 3  -AC (r) CH (t) AC (c)     Taking care of personal grooming (such as brushing teeth) 3  -AC (r) CH (t) AC (c)     Eating meals 3  -AC (r) CH (t) AC (c)     AM-PAC 6 Clicks Score (OT) 13  -AC (r) CH (t)     Row Name 04/27/23 1538          Functional Assessment    Outcome Measure Options AM-PAC 6 Clicks Daily Activity (OT)  -AC (r) CH (t) AC (c)           User Key  (r) = Recorded By, (t) = Taken By, (c) = Cosigned By    Initials Name Provider Type    Isabel Osorio, OT Occupational Therapist    Savannah Ford, OT Student OT Student                Occupational Therapy Education     Title: PT OT SLP Therapies (In Progress)     Topic: Occupational Therapy (In Progress)     Point: ADL training (Done)     Description:   Instruct learner(s) on proper safety adaptation and remediation techniques during self care or transfers.   Instruct in proper use of assistive devices.              Learning Progress Summary           Patient Acceptance, E, DU,NR by  at 4/27/2023 1539    Acceptance, E,TB,D, DU,NR by OLEGARIO at 4/22/2023 1621    Comment: OT POC; UE pulmonary HEP; incentive spirometer use    Acceptance, E, NR by  at 4/18/2023 0824   Family Acceptance, E, DU,NR by  at 4/27/2023 1539    Acceptance, E,TB,D, DU,NR by OLEGARIO at 4/22/2023 1621    Comment: OT POC; UE pulmonary HEP; incentive  spirometer use    Acceptance, E, VU by KR at 4/19/2023 0818                   Point: Home exercise program (Done)     Description:   Instruct learner(s) on appropriate technique for monitoring, assisting and/or progressing therapeutic exercises/activities.              Learning Progress Summary           Patient Acceptance, E,TB,D, DU,NR by  at 4/22/2023 1621    Comment: OT POC; UE pulmonary HEP; incentive spirometer use    Acceptance, E, NR by KR at 4/18/2023 0824   Family Acceptance, E,TB,D, DU,NR by OLEGARIO at 4/22/2023 1621    Comment: OT POC; UE pulmonary HEP; incentive spirometer use    Acceptance, E, VU by KR at 4/19/2023 0818                   Point: Precautions (In Progress)     Description:   Instruct learner(s) on prescribed precautions during self-care and functional transfers.              Learning Progress Summary           Patient Acceptance, E, NR by KR at 4/18/2023 0824   Family Acceptance, E, VU by KR at 4/19/2023 0818                   Point: Body mechanics (Done)     Description:   Instruct learner(s) on proper positioning and spine alignment during self-care, functional mobility activities and/or exercises.              Learning Progress Summary           Patient Acceptance, E, DU,NR by  at 4/27/2023 1539    Acceptance, E, NR by KR at 4/18/2023 0824   Family Acceptance, E, DU,NR by  at 4/27/2023 1539    Acceptance, E, VU by KR at 4/19/2023 0818                               User Key     Initials Effective Dates Name Provider Type Discipline    OLEGARIO 06/16/21 -  Zakia Jay OT Occupational Therapist OT     01/16/23 -  Cha Jefferson, RN Registered Nurse Nurse     03/10/23 -  Savannah Gallegos OT Student OT Student OT              OT Recommendation and Plan     Plan of Care Review  Progress: improving  Outcome Evaluation: Pt. demo'd good initiation of bed mobility and self-care tasks during OT tx session. Pt. did req. extra time to complete bed mobility and self care task this date. Pt. demo'd  improvement w/ static and dynamic sitting balance. Pt. is below baseline for self-care and BADLs d/t decreased endurance and decreased activity tolerance. Pt. would benefit from skilled OT to address these deficits and promote IND needed to complete fxal tasks. OT recommends SNF at d/c.     Time Calculation:    Time Calculation- OT     Row Name 04/27/23 1540             Time Calculation- OT    OT Start Time 1440  -AC (r) CH (t) AC (c)      OT Received On 04/27/23  -AC (r) CH (t) AC (c)      OT Goal Re-Cert Due Date 04/29/23  -AC (r) CH (t) AC (c)         Timed Charges    14355 - OT Self Care/Mgmt Minutes 44  -AC (r) CH (t) AC (c)         Total Minutes    Timed Charges Total Minutes 44  -AC (r) CH (t)       Total Minutes 44  -AC (r) CH (t)            User Key  (r) = Recorded By, (t) = Taken By, (c) = Cosigned By    Initials Name Provider Type    AC Isabel Moon, OT Occupational Therapist     Savannah Gallegos, OT Student OT Student              Therapy Charges for Today     Code Description Service Date Service Provider Modifiers Qty    [id number] HC OT SELF CARE/MGMT/TRAIN EA 15 MIN 4/27/2023 Savannah Gallegos OT Student GO 3               MUSTAPHA Lee  4/27/2023

## 2023-04-27 NOTE — PROGRESS NOTES
"[facility] at [facility]  IP Progress Note      Chief Complaint: Follow-up of acute on chronic combined systolic/diastolic CHF    Subjective   Patient lying in bed with nursing staff at bedside.  She does appear very uncomfortable.  Daughter is at bedside talking with palliative care nurse practitioner and is highly considering proceeding with home hospice.  Daughter questions whether cardiology agrees that this is the best plan of action moving forward.    Objective     Blood pressure 142/64, pulse 73, temperature 97.6 °F (36.4 °C), temperature source Oral, resp. rate 18, height 172 cm (67.72\"), weight 77.2 kg (170 lb 1.6 oz), SpO2 98 %.     Intake/Output Summary (Last 24 hours) at 4/27/2023 1329  Last data filed at 4/27/2023 1300  Gross per 24 hour   Intake --   Output 750 ml   Net -750 ml       Physical Exam:  General: Ill-appearing, peers physically uncomfortable  Neck: no JVD.  Chest:No respiratory distress, breath sounds are diminished at bases with fine crackles  Cardiovascular: Normal S1 and S2, regular/distant.  Extremities: No edema.    Results Review:     I reviewed the patient's new clinical results.    Results from last 7 days   Lab Units 04/26/23  0357   WBC 10*3/mm3 7.51   HEMOGLOBIN g/dL 11.7*   HEMATOCRIT % 39.6   PLATELETS 10*3/mm3 267     Results from last 7 days   Lab Units 04/27/23  0752   SODIUM mmol/L 149*   POTASSIUM mmol/L 5.0   CHLORIDE mmol/L 102   CO2 mmol/L 32.0*   BUN mg/dL 72*   CREATININE mg/dL 1.99*   CALCIUM mg/dL 8.8   GLUCOSE mg/dL 152*     Results from last 7 days   Lab Units 04/27/23  0752   SODIUM mmol/L 149*   POTASSIUM mmol/L 5.0   CHLORIDE mmol/L 102   CO2 mmol/L 32.0*   BUN mg/dL 72*   CREATININE mg/dL 1.99*   GLUCOSE mg/dL 152*   CALCIUM mg/dL 8.8         Lab Results   Component Value Date    CKTOTAL 44 04/24/2023    TROPONINT 310 (C) 04/17/2023                   Tele: Sinus Rythym      Assessment:  1. Cardiomyopathy/acute on chronic HFrEF  a. Echo " 4/19/23: EF 36-40%, grade I LV diastolic dysfunction, trace to mild MR, L pleural effusion  b. 4/25/23 proBNP 39,500  c. 4/24/2023 CXR: Extensive and worsening bibasilar atelectasis and effusion  2. Bilateral pleural effusions   a. s/p thoracentesis 1.35 L pleural fluid 4/19/2023  3. Nonobstructive CAD  a. [facility] 6/2021 mild to moderate nonobstructive multivessel CAD w/o any critical disease, EF 40-45%  4. Paroxysmal atrial fibrillation,   a. on Eliquis for anticoagulation  b. Currently in sinus rhythm  5. PEA Arrest 1/2023  6. Hypertension  7. Dyslipidemia, controlled on atorvastatin  8. CKD stage III  a. Creatinine 1.74  9. Type 2 diabetes  a. 3/2023 A1c 8.3  10. Extensive PAD with Bilateral Heal Ulcers/Cellulitis  11. UTI    Plan:  1. Overall prognosis is poor.  Has seen palliative care nurse practitioner and will meet with hospice today to determine goals of care.  I discussed that home with hospice is the best plan of action moving forward due to end-stage heart failure with stage IV renal disease and multiple recent hospitalizations.  2. For now, continue all cardiac medications at current dose.  She has been transition to oral medications in case the family chooses to proceed with hospice.  3. Not much more to add from a cardiac standpoint.  We will sign off and be available to see on an as-needed basis.        I have discussed the patient's case with palliative care, daughter, and nursing staff.  Prognosis remains poor.        Zakia Vergara PA-C

## 2023-04-27 NOTE — CONSULTS
Please see Hospice progress note from today 4/27 by Maria Teresa Santana for initial hospice consult.

## 2023-04-27 NOTE — PROGRESS NOTES
[facility] Medicine Services  PROGRESS NOTE    Patient Name: Susan Anderson  : 1940  MRN: [id number]    Date of Admission: 2023  Primary Care Physician: Arleen Doherty MD    Subjective   Subjective     CC:  CHF exacerbation    HPI:  Very uncomfortable appearing - daughter also noticing this and very concerned.  Cries out while getting changed today    Objective   Objective     Vital Signs:   Temp:  [95.2 °F (35.1 °C)-98.2 °F (36.8 °C)] 97.6 °F (36.4 °C)  Heart Rate:  [64-78] 73  Resp:  [16-22] 18  BP: (138-167)/(64-91) 142/64  Flow (L/min):  [3.5] 3.5     Physical Exam:  Constitutional: Frail appearing older female, uncomfortable appearing in bed  HENT: NCAT, mucous membranes moist  Respiratory: Increased WOB on 2-3 liters n/c, tachypneic  Cardiovascular: RRR, no murmurs, rubs, or gallops  Gastrointestinal: Soft, nontender, moderate distension  Musculoskeletal: Muscle tone decreased throughout, no joint effusions appreciated  Psychiatric: Appears uncomfortable, otherwise appropriate  Skin: Pale appearance    Results Reviewed:  LAB RESULTS:      Lab 23  0357 23  0305 23  0501   WBC 7.51 7.25 8.45   HEMOGLOBIN 11.7* 10.6* 10.8*   HEMATOCRIT 39.6 37.1 37.9   PLATELETS 267 234 233   .5* 102.2* 103.6*         Lab 23  0752 23  0357 23  0305 23  0501 23  0313   SODIUM 149* 152* 151* 146* 150*   POTASSIUM 5.0 4.8 4.2 4.4 3.9   CHLORIDE 102 105 107 104 105   CO2 32.0* 33.0* 35.0* 37.0* 34.0*   ANION GAP 15.0 14.0 9.0 5.0 11.0   BUN 72* 70* 69* 71* 67*   CREATININE 1.99* 1.73* 1.74* 1.82* 1.36*   EGFR 24.4* 28.8* 28.6* 27.1* 38.5*   GLUCOSE 152* 191* 58* 225* 134*   CALCIUM 8.8 8.6 8.2* 8.3* 8.6             Lab 23  0752 23  0305   PROBNP 48,901.0* 39,505.0*                 Lab 23  0554   FIO2 40   CARBOXYHEMOGLOBIN (VENOUS) 1.7     Brief Urine Lab Results  (Last result in the past 365 days)      Color    Clarity   Blood   Leuk Est   Nitrite   Protein   CREAT   Urine HCG        04/22/23 1044 Yellow   Cloudy   Small (1+)   Small (1+)   Negative   >=300 mg/dL (3+)                 Microbiology Results Abnormal     Procedure Component Value - Date/Time    Anaerobic Culture - Pleural Fluid, Pleural Cavity [[id number]]  (Normal) Collected: 04/19/23 1220    Lab Status: Final result Specimen: Pleural Fluid from Pleural Cavity Updated: 04/24/23 0648     Anaerobic Culture No anaerobes isolated at 5 days    Blood Culture - Blood, Arm, Right [[id number]]  (Normal) Collected: 04/17/23 1553    Lab Status: Final result Specimen: Blood from Arm, Right Updated: 04/22/23 1700     Blood Culture No growth at 5 days    Blood Culture - Blood, Arm, Left [[id number]]  (Normal) Collected: 04/17/23 1600    Lab Status: Final result Specimen: Blood from Arm, Left Updated: 04/22/23 1700     Blood Culture No growth at 5 days    Body Fluid Culture - Body Fluid, Pleural Cavity [[id number]] Collected: 04/19/23 1220    Lab Status: Final result Specimen: Body Fluid from Pleural Cavity Updated: 04/22/23 0819     Body Fluid Culture No growth at 3 days     Gram Stain Moderate (3+) WBCs seen      No organisms seen          No radiology results from the last 24 hrs    Results for orders placed during the hospital encounter of 04/17/23    Adult Transthoracic Echo Complete W/ Cont if Necessary Per Protocol    Interpretation Summary  •  Left ventricular systolic function is moderately decreased. Left ventricular ejection fraction appears to be 36 - 40%.  •  Left ventricular diastolic function is consistent with (grade I) impaired relaxation.  •  Trace to mild mitral vegetation.  •  A left sided pleural effusion is noted.      Current medications:  Scheduled Meds:apixaban, 2.5 mg, Oral, Q12H  atorvastatin, 40 mg, Oral, Nightly  bumetanide, 1 mg, Oral, BID  clopidogrel, 75 mg, Oral, Daily  empagliflozin, 10 mg, Oral, Daily  famotidine, 20 mg, Oral, Daily  insulin  lispro, 0-7 Units, Subcutaneous, TID With Meals  losartan, 50 mg, Oral, Q24H  metoprolol succinate XL, 25 mg, Oral, Q24H  minocycline, 100 mg, Oral, Q12H  nystatin, 5 mL, Swish & Swallow, 4x Daily  nystatin, , Topical, Q12H  polyethylene glycol, 17 g, Oral, Daily  senna-docusate sodium, 2 tablet, Oral, BID  sertraline, 50 mg, Oral, Daily      Continuous Infusions:Pharmacy Consult - Pharmacy to dose,       PRN Meds:.•  senna-docusate sodium **AND** polyethylene glycol **AND** bisacodyl **AND** bisacodyl  •  nitroglycerin  •  ondansetron  •  Pharmacy Consult - Pharmacy to dose  •  prochlorperazine **OR** prochlorperazine **OR** prochlorperazine  •  sodium chloride  •  traMADol    Assessment & Plan   Assessment & Plan     Active Hospital Problems    Diagnosis  POA   • **Cellulitis of right foot due to methicillin-resistant Staphylococcus aureus [L03.115, B95.62]  Yes   • Severe Malnutrition ([facility]) [E43]  Yes   • Acute respiratory failure with hypoxia and hypercarbia [J96.01, J96.02]  Unknown   • T2DM  [E11.9]  Yes   • Paroxysmal atrial fibrillation [I48.0]  Yes   • Chronic combined systolic and diastolic heart failure  [I50.42]  Yes   • NICM (nonischemic cardiomyopathy) ([facility]) [I42.8]  Yes   • Dyslipidemia [E78.5]  Yes   • Mitral valve disease [I05.9]  Yes   • Acute on chronic systolic CHF (congestive heart failure) [I50.23]  Yes   • Diabetic foot ulcer [E11.621, L97.509]  Yes   • PVD (peripheral vascular disease) ([facility]) [I73.9]  Yes   • Diabetes mellitus with neuropathy [E11.40]  Yes   • Stage 3a chronic kidney disease [N18.31]  Yes   • Essential hypertension [I10]  Yes      Resolved Hospital Problems   No resolved problems to display.        Brief Hospital Course to date:  Susan Anderson is a 83 y.o. female w multiple readmissions for severe CHFpEF, PEA arrest 1/2023, CKDV w h/o LACEY-D who presented again with increased dyspnea and fatigue. On 4/25, worsened pulmonary edema on CXR and more elevated proBNP    Acute on  chronic CHFpEF c/b pleural effusions - worse 4/25  Acute on chronic hypoxic resp failure 2/2 above  -transitioned to PO bumex, hopeful for plan for home  -other med management per cards  -overall, her CHF is very severe bears a poor prognosis given frequent readmissions. D/w daughter on 4/27 who is discussing with hospice, see ACP note from 4/27    Parainfluenza - supportive care, think this is smaller component to the above    CKDIV - complicated diuresis, will need close monitoring. On ARB, low threshold to hold    Chronic bilateral heel wounds - minocycline per ID, wound care and vasc surgery clinic f/u    GOC - see ACP note from date. Exploring home w hospice\  PUD - pepcid  Paroxysmal Afib - eliquis, BB  Weight loss - 25 kg weight loss in last 6 months on review. C/w poor prognosis    Expected Discharge Location and Transportation: possible home w hospice  Expected Discharge if home w hospice, soon. If not, more time to stabilize likely Monday  Expected Discharge Date: 04/28/23       DVT prophylaxis:  Medical DVT prophylaxis orders are present.     AM-PAC 6 Clicks Score (PT): 10 (04/26/23 8974)    CODE STATUS:   Code Status and Medical Interventions:   Ordered at: 04/17/23 1800     Code Status (Patient has no pulse and is not breathing):    No CPR (Do Not Attempt to Resuscitate)     Medical Interventions (Patient has pulse or is breathing):    Full       Macy Hall MD  04/27/23

## 2023-04-27 NOTE — PLAN OF CARE
Goal Outcome Evaluation:           Progress: improving  Outcome Evaluation: PtBrenda lennon'jazzmine good initiation of bed mobility and self-care tasks during OT tx session. Pt. did req. extra time to complete bed mobility and self care task this date. PtBrenda lennon'jazzmine improvement w/ static and dynamic sitting balance. Pt. is below baseline for self-care and BADLs d/t decreased endurance and decreased activity tolerance. Pt. would benefit from skilled OT to address these deficits and promote IND needed to complete fxal tasks. OT recommends SNF at d/c.

## 2023-04-27 NOTE — ACP (ADVANCE CARE PLANNING)
D/w patient's daughter bedside on 4/27, she asks if her mother is appropriate for home with hospice.  I affirmed her appropriateness given prognosis <6 months and end-stage cardiac and renal disease with many hospital stays. Discussed bluntly that her time is short and without a hospice-minded approach, would likely continue to involve many days in hospital, many specialty visits, etc. Daughter interested in d/w outpatient hospice today who I consulted. Informed all other teams of this update.    F/u in afternoon:  Plan for home w hospice as soon as family has logistics. They report they will have more information about this on 4/28. Believe she does not need longer hospitalization for medical stabilization given her optimization is so challenging. Dyspnea will be principal symptom - discussed use of fans and morphine on hospice w daughter and patient as well.

## 2023-04-27 NOTE — PLAN OF CARE
Goal Outcome Evaluation:  Plan of Care Reviewed With: daughter (pt sleeping at time of Pallliative RN encounter)        Progress: no change  Outcome Evaluation: Palliative consult for GOC/ACP. Pt has ACP doc on file, dtr Katie Anderson is NOK and HCS. Hospice consult placed prior to Palliative RN encounter today after d/w dtr by Dr. Hall. Dtr stated that she just needs to make some arrangements for ongoing caregivers, and establish whether to go to pt's home or dtr's home, and wishes to take pt home with hospice services, not continue frequent return to hospital. Hospice Liaison to follow up today. Pt has Tramadol prn for pain; no observable signs of discomfort at time of Palliative RN encounter, reviewed with dtr medication is available if needed. Palliative following for continued management of symptoms, support in GOC discussion; will follow up code status in light of decision for home with hospice.    1300 Palliative IDT meeting: INDIGO SANTANA, RN    After hours, weekends and holidays, contact Palliative Provider by calling [phone]    Problem: Palliative Care  Goal: Enhanced Quality of Life  Outcome: Ongoing, Progressing  Intervention: Promote Advance Care Planning  Flowsheets (Taken 4/27/2023 1357)  Life Transition/Adjustment: (Hospice consult in place)   palliative care discussed   palliative care initiated   other (see comments)  Intervention: Maximize Comfort  Flowsheets (Taken 4/27/2023 1357)  Pain Management Interventions: pain management plan reviewed with patient/caregiver  Intervention: Optimize Function  Flowsheets (Taken 4/27/2023 1357)  Fatigue Management:   frequent rest breaks encouraged   paced activity encouraged  Sleep/Rest Enhancement:   family presence promoted   natural light exposure provided  Intervention: Optimize Psychosocial Wellbeing  Flowsheets (Taken 4/27/2023 1357)  Supportive Measures:   decision-making supported   goal-setting facilitated  Spiritual Activities Assistance: (Spiritual  Care consult) other (see comments)  Family/Support System Care:   caregiver stress acknowledged   involvement promoted   presence promoted   self-care encouraged   support provided

## 2023-04-27 NOTE — PROGRESS NOTES
Continued Stay Note  [facility]     Patient Name: Susan Anderson  MRN: [id number]  Today's Date: 4/27/2023    Admit Date: 4/17/2023    Plan: Home with hospice when medically ready and once family has secured caregivers   Discharge Plan     Row Name 04/27/23 1335       Plan    Plan Home with hospice when medically ready and once family has secured caregivers    Plan Comments Hospice RN met with adryan Wilson at bedside and discussed goals of care as well as discharge planning.  She would like to take the patient home with hospice.  She states that the patient does not want to continue coming to the hospital.  Daughter has an EMS/DNR for the patient.  She does need to secure caregivers to come into the home.  The patient will be going home to her residence on [address] and the daughter and son-in-law will stay with her.  Her only equipment need is oxygen.  She currently has this through [employer].  Daughter wants to make sure they can have a portable concentrator so patient can enjoy sitting on her deck.  24-hour hospice number provided to daughter.  Have updated Dr. Hall, palliative team, nurse, and  on plan.  Hospice will continue to follow.  Please call ext [phone] for further assist.               Discharge Codes    No documentation.               Expected Discharge Date and Time     Expected Discharge Date Expected Discharge Time    May 1, 2023             Maria Teresa Santana RN

## 2023-04-28 VITALS
HEIGHT: 68 IN | HEART RATE: 75 BPM | TEMPERATURE: 97.9 F | SYSTOLIC BLOOD PRESSURE: 164 MMHG | OXYGEN SATURATION: 100 % | WEIGHT: 173.2 LBS | RESPIRATION RATE: 15 BRPM | BODY MASS INDEX: 26.25 KG/M2 | DIASTOLIC BLOOD PRESSURE: 89 MMHG

## 2023-04-28 LAB — GLUCOSE BLDC GLUCOMTR-MCNC: 178 MG/DL (ref 70–130)

## 2023-04-28 PROCEDURE — 63710000001 INSULIN LISPRO (HUMAN) PER 5 UNITS: Performed by: INTERNAL MEDICINE

## 2023-04-28 PROCEDURE — 99239 HOSP IP/OBS DSCHRG MGMT >30: CPT | Performed by: STUDENT IN AN ORGANIZED HEALTH CARE EDUCATION/TRAINING PROGRAM

## 2023-04-28 PROCEDURE — 82948 REAGENT STRIP/BLOOD GLUCOSE: CPT

## 2023-04-28 RX ORDER — BUMETANIDE 1 MG/1
1 TABLET ORAL 2 TIMES DAILY
Qty: 60 TABLET | Refills: 0 | Status: SHIPPED | OUTPATIENT
Start: 2023-04-28 | End: 2023-04-28 | Stop reason: SDUPTHER

## 2023-04-28 RX ORDER — INSULIN GLARGINE 100 [IU]/ML
3 INJECTION, SOLUTION SUBCUTANEOUS DAILY
Qty: 100 ML | Refills: 0
Start: 2023-04-28

## 2023-04-28 RX ORDER — BUMETANIDE 1 MG/1
1 TABLET ORAL 2 TIMES DAILY
Qty: 60 TABLET | Refills: 0 | Status: SHIPPED | OUTPATIENT
Start: 2023-04-28

## 2023-04-28 RX ORDER — MINOCYCLINE HYDROCHLORIDE 100 MG/1
100 CAPSULE ORAL 2 TIMES DAILY
Qty: 20 CAPSULE | Refills: 0 | Status: SHIPPED | OUTPATIENT
Start: 2023-04-28 | End: 2023-05-08

## 2023-04-28 RX ORDER — FAMOTIDINE 20 MG/1
20 TABLET, FILM COATED ORAL DAILY
Qty: 30 TABLET | Refills: 0 | Status: SHIPPED | OUTPATIENT
Start: 2023-04-29 | End: 2023-04-28 | Stop reason: HOSPADM

## 2023-04-28 RX ADMIN — SENNOSIDES AND DOCUSATE SODIUM 2 TABLET: 8.6; 5 TABLET ORAL at 09:29

## 2023-04-28 RX ADMIN — SERTRALINE HYDROCHLORIDE 50 MG: 50 TABLET ORAL at 09:29

## 2023-04-28 RX ADMIN — INSULIN LISPRO 2 UNITS: 100 INJECTION, SOLUTION INTRAVENOUS; SUBCUTANEOUS at 09:34

## 2023-04-28 RX ADMIN — LOSARTAN POTASSIUM 50 MG: 50 TABLET, FILM COATED ORAL at 09:29

## 2023-04-28 RX ADMIN — APIXABAN 2.5 MG: 2.5 TABLET, FILM COATED ORAL at 09:29

## 2023-04-28 RX ADMIN — MINOCYCLINE HYDROCHLORIDE 100 MG: 50 CAPSULE ORAL at 09:30

## 2023-04-28 RX ADMIN — EMPAGLIFLOZIN 10 MG: 10 TABLET, FILM COATED ORAL at 09:29

## 2023-04-28 RX ADMIN — METOPROLOL SUCCINATE 25 MG: 25 TABLET, EXTENDED RELEASE ORAL at 09:29

## 2023-04-28 RX ADMIN — CLOPIDOGREL BISULFATE 75 MG: 75 TABLET ORAL at 09:29

## 2023-04-28 RX ADMIN — FAMOTIDINE 20 MG: 20 TABLET, FILM COATED ORAL at 09:29

## 2023-04-28 RX ADMIN — BUMETANIDE 1 MG: 1 TABLET ORAL at 09:29

## 2023-04-28 NOTE — DISCHARGE SUMMARY
[facility] Medicine Services  DISCHARGE SUMMARY    Patient Name: Susan Anderson  : 1940  MRN: [id number]    Date of Admission: 2023  2:53 PM  Date of Discharge: 2023  Primary Care Physician: Arleen Doherty MD    Consults     Date and Time Order Name Status Description    2023 10:32 AM Inpatient Palliative Care MD Consult Completed     2023  7:40 AM Inpatient Cardiology Consult Completed     2023 12:34 AM Inpatient Infectious Diseases Consult Completed     2023  8:43 PM Inpatient Cardiothoracic Surgery Consult Completed           Hospital Course     Presenting Problem:   Cellulitis of right foot due to methicillin-resistant Staphylococcus aureus [L03.115, B95.62]    Active Hospital Problems    Diagnosis  POA   • **Cellulitis of right foot due to methicillin-resistant Staphylococcus aureus [L03.115, B95.62]  Yes   • Severe Malnutrition (HCC) [E43]  Yes   • Acute respiratory failure with hypoxia and hypercarbia [J96.01, J96.02]  Unknown   • T2DM  [E11.9]  Yes   • Paroxysmal atrial fibrillation [I48.0]  Yes   • Chronic combined systolic and diastolic heart failure  [I50.42]  Yes   • NICM (nonischemic cardiomyopathy) ([facility]) [I42.8]  Yes   • Dyslipidemia [E78.5]  Yes   • Mitral valve disease [I05.9]  Yes   • Acute on chronic systolic CHF (congestive heart failure) [I50.23]  Yes   • Diabetic foot ulcer [E11.621, L97.509]  Yes   • PVD (peripheral vascular disease) ([facility]) [I73.9]  Yes   • Diabetes mellitus with neuropathy [E11.40]  Yes   • Stage 3a chronic kidney disease [N18.31]  Yes   • Essential hypertension [I10]  Yes      Resolved Hospital Problems   No resolved problems to display.          Hospital Course:  Susan Anderson is a 83 y.o. female w multiple readmissions for severe CHFpEF, PEA arrest 2023, CKDV w h/o LACEY-D who presented again with increased dyspnea and fatigue. On , worsened pulmonary edema on CXR and more elevated proBNP. Patient  has an overall very poor prognosis with multisystem organ failure.  Winnie with the patient's daughter at bedside on 4/28 and she elected to continue her oral medicines for now, to continue insulin (discontinued long-acting insulin given has not been using here, also decreased recommendations for sliding scale on discharge), give antibiotics on discharge per patient's family request.  Discussed with Dr. Colvin and Dr. Stef Blunt and they were also in agreement to transition to hospice.     This patient's problems and plans were partially entered by my partner and updated as appropriate by me 04/28/23.     Acute on chronic CHFpEF c/b pleural effusions - worse 4/25  Acute on chronic hypoxic resp failure 2/2 above  -transitioned to PO bumex, hopeful for plan for home  -other med management per cards  -overall, her CHF is very severe bears a poor prognosis given frequent readmissions.   -My practice partner discussed with daughter on 4/27, who elected to transition patient to hospice given extremely poor prognosis  -Hospice consulted, plan is home with hospice today via ambulance transport     Parainfluenza - supportive care, think this is smaller component to the above     CKDIV - complicated diuresis, will need close monitoring if ever chooses to come off of hospice. On ARB, low threshold to hold     Chronic bilateral heel wounds - minocycline per ID, wound care and vasc surgery clinic f/u if in line with goals of care     GOC - see ACP note  PUD - pepcid  Paroxysmal Afib - eliquis, BB  Weight loss - 25 kg weight loss in last 6 months on review. C/w poor prognosis      Discharge Follow Up Recommendations for outpatient labs/diagnostics:  Follow-up with hospice nurse  Follow-up with PCP as needed  Follow-up with subspecialist including CT surgery, cardiology, ID, if chooses to transition to hospice    Day of Discharge     HPI:   Patient comfortable except during movement.  Daughter at bedside.    Review of  Systems  Unable to obtain given altered mental status    Vital Signs:   Temp:  [96.4 °F (35.8 °C)-97.9 °F (36.6 °C)] 97.9 °F (36.6 °C)  Heart Rate:  [67-75] 75  Resp:  [14-18] 15  BP: (121-171)/() 164/89  Flow (L/min):  [5] 5      Physical Exam:  Constitutional: No acute distress, sleeping  HENT: NCAT, mucous membranes moist  Respiratory: Diffuse crackles, respiratory effort normal   Gastrointestinal: nondistended  Musculoskeletal: 1+ bilateral ankle edema  Neurologic: Symmetric facies  Skin: No rashes    Pertinent  and/or Most Recent Results     LAB RESULTS:      Lab 04/26/23  0357 04/25/23  0305 04/24/23  0501   WBC 7.51 7.25 8.45   HEMOGLOBIN 11.7* 10.6* 10.8*   HEMATOCRIT 39.6 37.1 37.9   PLATELETS 267 234 233   .5* 102.2* 103.6*         Lab 04/27/23  0752 04/26/23  0357 04/25/23  0305 04/24/23  0501 04/22/23  0313   SODIUM 149* 152* 151* 146* 150*   POTASSIUM 5.0 4.8 4.2 4.4 3.9   CHLORIDE 102 105 107 104 105   CO2 32.0* 33.0* 35.0* 37.0* 34.0*   ANION GAP 15.0 14.0 9.0 5.0 11.0   BUN 72* 70* 69* 71* 67*   CREATININE 1.99* 1.73* 1.74* 1.82* 1.36*   EGFR 24.4* 28.8* 28.6* 27.1* 38.5*   GLUCOSE 152* 191* 58* 225* 134*   CALCIUM 8.8 8.6 8.2* 8.3* 8.6             Lab 04/27/23  0752 04/25/23  0305   PROBNP 48,901.0* 39,505.0*                 Lab 04/27/23  0554   FIO2 40   CARBOXYHEMOGLOBIN (VENOUS) 1.7     Brief Urine Lab Results  (Last result in the past 365 days)      Color   Clarity   Blood   Leuk Est   Nitrite   Protein   CREAT   Urine HCG        04/22/23 1044 Yellow   Cloudy   Small (1+)   Small (1+)   Negative   >=300 mg/dL (3+)               Microbiology Results (last 10 days)     Procedure Component Value - Date/Time    Respiratory Panel PCR w/COVID-19(SARS-CoV-2) [facility]RACHITO/[facility]/MARCELA/PAD/COR/MAD/VERITO In-House, NP Swab in UTM/VTM, 3-4 HR TAT - Swab, Nasopharynx [[id number]]  (Abnormal) Collected: 04/25/23 1142    Lab Status: Final result Specimen: Swab from Nasopharynx Updated: 04/25/23 6203      ADENOVIRUS, PCR Not Detected     Coronavirus 229E Not Detected     Coronavirus HKU1 Not Detected     Coronavirus NL63 Not Detected     Coronavirus OC43 Not Detected     COVID19 Not Detected     Human Metapneumovirus Not Detected     Human Rhinovirus/Enterovirus Not Detected     Influenza A PCR Not Detected     Influenza B PCR Not Detected     Parainfluenza Virus 1 Not Detected     Parainfluenza Virus 2 Not Detected     Parainfluenza Virus 3 Detected     Parainfluenza Virus 4 Not Detected     RSV, PCR Not Detected     Bordetella pertussis pcr Not Detected     Bordetella parapertussis PCR Not Detected     Chlamydophila pneumoniae PCR Not Detected     Mycoplasma pneumo by PCR Not Detected    Narrative:      In the setting of a positive respiratory panel with a viral infection PLUS a negative procalcitonin without other underlying concern for bacterial infection, consider observing off antibiotics or discontinuation of antibiotics and continue supportive care. If the respiratory panel is positive for atypical bacterial infection (Bordetella pertussis, Chlamydophila pneumoniae, or Mycoplasma pneumoniae), consider antibiotic de-escalation to target atypical bacterial infection.    Urine Culture - Urine, Urine, Clean Catch [[id number]]  (Abnormal)  (Susceptibility) Collected: 04/22/23 1044    Lab Status: Final result Specimen: Urine, Clean Catch Updated: 04/24/23 0729     Urine Culture 25,000 CFU/mL Klebsiella pneumoniae ESBL     Comment:   Consider infectious disease consult.  Susceptibility results may not correlate to clinical outcomes.       Narrative:      Colonization of the urinary tract without infection is common. Treatment is discouraged unless the patient is symptomatic, pregnant, or undergoing an invasive urologic procedure.  Recent outcomes data supports the use of pip/tazo in the treatment of susceptible ESBL infections for uncomplicated UTI. Consider use of pip/tazo as a carbapenem-sparing regimen in  applicable patients.    Susceptibility      Klebsiella pneumoniae ESBL      NAIMA      Ertapenem Susceptible      Gentamicin Susceptible      Levofloxacin Intermediate      Meropenem Susceptible      Nitrofurantoin Intermediate      Piperacillin + Tazobactam Resistant     Trimethoprim + Sulfamethoxazole Susceptible                           Body Fluid Culture - Body Fluid, Pleural Cavity [[id number]] Collected: 04/19/23 1220    Lab Status: Final result Specimen: Body Fluid from Pleural Cavity Updated: 04/22/23 0819     Body Fluid Culture No growth at 3 days     Gram Stain Moderate (3+) WBCs seen      No organisms seen    Anaerobic Culture - Pleural Fluid, Pleural Cavity [[id number]]  (Normal) Collected: 04/19/23 1220    Lab Status: Final result Specimen: Pleural Fluid from Pleural Cavity Updated: 04/24/23 0648     Anaerobic Culture No anaerobes isolated at 5 days          Adult Transthoracic Echo Complete W/ Cont if Necessary Per Protocol    Result Date: 4/19/2023  •  Left ventricular systolic function is moderately decreased. Left ventricular ejection fraction appears to be 36 - 40%. •  Left ventricular diastolic function is consistent with (grade I) impaired relaxation. •  Trace to mild mitral vegetation. •  A left sided pleural effusion is noted.     CT Chest Without Contrast Diagnostic    Result Date: 4/17/2023  CT CHEST WO CONTRAST DIAGNOSTIC Date of Exam: 4/17/2023 7:49 PM EDT Indication: Pneumonia, complication suspected, xray done. Comparison: 4/17/2023, 1/20/2023 Technique: Axial CT images were obtained of the chest without contrast administration.  Reconstructed coronal and sagittal images were also obtained. Automated exposure control and iterative construction methods were used. Findings: Hilum and Mediastinum: No pathologically enlarged lymph nodes. The heart appears enlarged. No pericardial effusion.  Unremarkable thoracic aorta and pulmonary arteries. Lung Parenchyma and Pleura: Large bilateral pleural  effusions are present. Intralobular septal thickening is present. Atelectatic changes are present within the bilateral lower lobes. No definite consolidation identified though evaluation is limited due to significant atelectasis. Atherosclerotic calcifications are present including within the pulmonary arteries. Granulomatous calcifications are present. Upper Abdomen: No acute process. Soft tissues: Unremarkable. Osseous structures: No aggressive focal lytic or sclerotic osseous lesions.     Impression: 1. At least moderate pulmonary edema pattern with large bilateral pleural effusions and bibasilar atelectasis. There is cardiomegaly. No definite consolidation identified though significant atelectatic changes are present. Superimposed pneumonia cannot be excluded. 2. Ancillary findings as described above. Electronically Signed: Karen Rey  4/17/2023 8:10 PM EDT  Workstation ID: [id number]    XR Chest 1 View    Result Date: 4/25/2023  XR CHEST 1 VW Date of Exam: 4/25/2023 5:35 AM EDT Indication: DYSPNEA, ON EXERTION dyspnea Comparison: 4/20/2023 Findings: There is gradually worsening opacity of both lower and mid lungs, apparently increasing pleural effusion and atelectasis, now with only the upper lungs remaining well aerated. Heart shadow is partly obscured but appears enlarged. There appears to be persistent perihilar edema.     Impression: Extensive and worsening bibasilar atelectasis and effusion, and persistent pulmonary vascular congestion. Electronically Signed: Breezy Rey  4/25/2023 8:59 AM EDT  Workstation ID: [id number]    XR Chest 1 View    Result Date: 4/20/2023  XR CHEST 1 VW Date of Exam: 4/20/2023 8:12 AM EDT Indication: pulmonary edema. Comparison: April 19, 2023 Findings: There are small right and moderate left pleural effusions with bibasilar opacities. The heart and mediastinal contours appear normal. The pulmonary vasculature appears normal. There are degenerative changes along the left shoulder.      Impression: 1. Small right and moderate left pleural effusions. 2. Bibasal opacities, which could reflect atelectasis or pneumonia. Electronically Signed: Jah Champion  4/20/2023 8:57 AM EDT  Workstation ID: [id number]    XR Chest 1 View    Result Date: 4/19/2023  XR CHEST 1 VW Date of Exam: 4/19/2023 3:08 PM EDT Indication: Dyspnea. Comparison: 4/19/2023 Findings: The heart appears enlarged. There is indistinctness of the pulmonary vasculature. Small to moderate-sized bilateral pleural effusions are present. No pneumothorax. Patchy airspace disease seen within the lung bases bilaterally. No acute osseous abnormality identified.     Impression: New/worsening bibasilar airspace disease with small to moderate-sized bilateral pleural effusions with underlying mild pulmonary edema pattern. Electronically Signed: Karen Rey  4/19/2023 3:40 PM EDT  Workstation ID: [id number]    XR Chest 1 View    Result Date: 4/19/2023  XR CHEST 1 VW Date of Exam: 4/19/2023 12:18 PM EDT Indication: right sided thoracentesis. Comparison: April 19, 2023 Findings: The heart looks enlarged. There is an improved appearance of the right basilar area. There is some haziness at the right base that could reflect small right pleural effusion and/or some underlying atelectasis. There remains density in the left lower chest which could relate to pleural effusion and underlying atelectasis. There is no definite pneumothorax.     Impression: 1.Improved appearance to the right basilar area that could relate to interval thoracentesis. There as probably some residual effusion and atelectasis at the right base. 2.Left pleural effusion and atelectasis is again noted. 3.Cardiomegaly Electronically Signed: Fam Hunt MD  4/19/2023 12:58 PM EDT  Workstation ID: [id number]    XR Chest 1 View    Result Date: 4/19/2023  XR CHEST 1 VW Date of Exam: 4/19/2023 3:30 AM EDT Indication: DYSPNEA, ON EXERTION Comparison: CT chest 4/17/2023. Chest radiograph  4/17/2023. Findings: Stable moderate right and small left pleural effusions with overlying airspace disease/atelectasis. No pneumothorax or new lung opacity. Cardiac silhouette is partially obscured, but appears unchanged. No acute osseous abnormalities.     Impression: Stable bibasilar airspace disease/atelectasis overlying moderate right and small left pleural effusions. Electronically Signed: Jerry Oliverio  4/19/2023 7:39 AM EDT  Workstation ID: [id number]    XR Chest 1 View    Result Date: 4/17/2023  XR CHEST 1 VW Date of Exam: 4/17/2023 3:46 PM EDT Indication: Weak/Dizzy/AMS triage protocol. Comparison: January 28, 2023 FINDINGS: There is a suspected moderate right and a small left pleural effusion. There are basilar predominant opacities with prominent pulmonary vasculature. The heart appears enlarged, as before. Right IJ catheter is no longer seen.  No pneumothorax is identified.     1.Moderate right and small left pleural effusions. 2.Basilar predominant opacities with cardiomegaly and prominent pulmonary vasculature, suspicious for edema and atelectasis.  Electronically Signed: Billy Hadley  4/17/2023 4:11 PM EDT  Workstation ID: [id number]    US Thoracentesis    Result Date: 4/19/2023  US THORACENTESIS                                                         History: US THORACENTESIS                                                : Abdirashid Jose MD. Assistant:  None.                                                                              Modality: Ultrasound                                                                                                           Sedation: None. Anesthesia: Lidocaine local infiltration..          Estimated blood loss:  < 5 cc.         Technique: Discussion of risks, benefits, and alternatives were made with the patient. The patient expressed understanding and agreed to proceed. Informed written consent was obtained. A universal timeout was performed prior  to starting the procedure. Maximal sterile precautions were utilized. The procedure room personnel used personal protective equipment. The operators additionally used sterile surgical gloves. A preliminary ultrasonography was performed. It showed a pleural effusion. A low intercostal access site was selected for access. Pertinent ultrasound images were stored to the PACS for documentation. The patient position was optimized for the performance of thoracentesis. The access site was sterilely prepped and draped. Local anesthesia was administered. A catheter over the needle system was advanced into the pleura. Straw colored fluid was aspirated and the plastic catheter advanced into the pleural space. The catheter was then connected to a fluid recovery system. Endpoint of thoracentesis: Chest pain At the end of the procedure, the catheter was withdrawn and an aseptic dressing applied. The patient tolerated the procedure well. Postprocedure chest x-ray showed no pneumothorax. After uneventful recovery recovery, the patient was discharged from the department in stable condition.           Complications: None immediate. Specimen: Sent to the lab.                                                                  Impression:   Successful ultrasound-guided thoracentesis of right pleural effusion with recovery of 1.35 liters of pleural fluid.                                                           Thank you for the opportunity to assist in the care of your patient. Electronically Signed: Abdirashid Jose  4/19/2023 1:55 PM EDT  Workstation ID: [id number]    Doppler Ankle Brachial Index Single Level CAR    Result Date: 4/19/2023  •  Right Conclusion: The right HOMER is unable to be assessed due to vessel incompressibility. Unable to assess digital ischemia.  Waveforms are difficult to assess due to A-fib and PVCs.  Reported palpable pulses. •  Left Conclusion: The left HOMER is unable to be assessed due to vessel incompressibility.   Possible severe digital ischemia. Waveforms are difficult to assess due to A-fib and PVCs.  Reported palpable pulses. •  Findings overall are inconclusive.  Alternative evaluation methods such as an arterial duplex ultrasound or CT angiogram with runoff could be considered if clinically warranted.       Results for orders placed during the hospital encounter of 04/17/23    Doppler Ankle Brachial Index Single Level CAR    Interpretation Summary  •  Right Conclusion: The right HOMER is unable to be assessed due to vessel incompressibility. Unable to assess digital ischemia.  Waveforms are difficult to assess due to A-fib and PVCs.  Reported palpable pulses.  •  Left Conclusion: The left HOMER is unable to be assessed due to vessel incompressibility.  Possible severe digital ischemia. Waveforms are difficult to assess due to A-fib and PVCs.  Reported palpable pulses.  •  Findings overall are inconclusive.  Alternative evaluation methods such as an arterial duplex ultrasound or CT angiogram with runoff could be considered if clinically warranted.      Results for orders placed during the hospital encounter of 04/17/23    Doppler Ankle Brachial Index Single Level CAR    Interpretation Summary  •  Right Conclusion: The right HOMER is unable to be assessed due to vessel incompressibility. Unable to assess digital ischemia.  Waveforms are difficult to assess due to A-fib and PVCs.  Reported palpable pulses.  •  Left Conclusion: The left HOMER is unable to be assessed due to vessel incompressibility.  Possible severe digital ischemia. Waveforms are difficult to assess due to A-fib and PVCs.  Reported palpable pulses.  •  Findings overall are inconclusive.  Alternative evaluation methods such as an arterial duplex ultrasound or CT angiogram with runoff could be considered if clinically warranted.      Results for orders placed during the hospital encounter of 04/17/23    Adult Transthoracic Echo Complete W/ Cont if Necessary Per  Protocol    Interpretation Summary  •  Left ventricular systolic function is moderately decreased. Left ventricular ejection fraction appears to be 36 - 40%.  •  Left ventricular diastolic function is consistent with (grade I) impaired relaxation.  •  Trace to mild mitral vegetation.  •  A left sided pleural effusion is noted.      Plan for Follow-up of Pending Labs/Results: PCP    Discharge Details        Discharge Medications      New Medications      Instructions Start Date   empagliflozin 10 MG tablet tablet  Commonly known as: JARDIANCE   10 mg, Oral, Daily   Start Date: April 29, 2023        Changes to Medications      Instructions Start Date   bumetanide 1 MG tablet  Commonly known as: BUMEX  What changed: when to take this   1 mg, Oral, 2 Times Daily      insulin glargine 100 UNIT/ML injection  Commonly known as: Lantus  What changed: how much to take   3 Units, Subcutaneous, Daily      insulin lispro 100 UNIT/ML injection  Commonly known as: HumaLOG  What changed: additional instructions   Sliding Scale Before Meals: Blood glucose 150-199 mg/dL - 2 units  Blood glucose 200-249 mg/dL - 3 units  Blood glucose 250-299 mg/dL - 4 units  Blood glucose 300-349 mg/dL - 5 units  Blood glucose 350-400 mg/dL - 6 units         Continue These Medications      Instructions Start Date   acetaminophen 325 MG tablet  Commonly known as: TYLENOL   650 mg, Oral, Every 6 Hours PRN      apixaban 2.5 MG tablet tablet  Commonly known as: ELIQUIS   2.5 mg, Oral, Every 12 Hours Scheduled      atorvastatin 40 MG tablet  Commonly known as: LIPITOR   40 mg, Oral, Daily      clopidogrel 75 MG tablet  Commonly known as: PLAVIX   75 mg, Oral, Daily      clotrimazole-betamethasone 1-0.05 % cream  Commonly known as: Lotrisone   1 application, Topical, 2 Times Daily      cyclobenzaprine 5 MG tablet  Commonly known as: FLEXERIL   5 mg, Oral, 3 Times Daily PRN      gabapentin 100 MG capsule  Commonly known as: NEURONTIN   100 mg, Oral, 2 Times  "Daily      glucose blood test strip  Commonly known as: Accu-Chek SmartView   1 each, Other, 3 Times Daily, Use as instructed      guaiFENesin 600 MG 12 hr tablet  Commonly known as: MUCINEX   1,200 mg, Oral, 2 Times Daily PRN      hydrOXYzine 25 MG tablet  Commonly known as: ATARAX   25 mg, Oral, Nightly PRN      Lancets misc   1 each, Does not apply, 3 Times Daily      losartan 50 MG tablet  Commonly known as: COZAAR   100 mg, Oral, Daily      metoprolol succinate XL 25 MG 24 hr tablet  Commonly known as: TOPROL-XL   25 mg, Oral, Every 24 Hours Scheduled      minocycline 100 MG capsule  Commonly known as: MINOCIN,DYNACIN   100 mg, Oral, 2 Times Daily      omeprazole 20 MG capsule  Commonly known as: priLOSEC   20 mg, Oral, 2 Times Daily      ondansetron ODT 4 MG disintegrating tablet  Commonly known as: ZOFRAN-ODT   4 mg, Translingual, Every 8 Hours PRN      Pen Needles 3/16\" 31G X 5 MM misc   1 each, Does not apply, Daily      polyethylene glycol 17 GM/SCOOP powder  Commonly known as: MIRALAX   17 g, Oral, Daily PRN      sennosides-docusate 8.6-50 MG per tablet  Commonly known as: PERICOLACE   1 tablet, Oral, Nightly PRN      sertraline 50 MG tablet  Commonly known as: Zoloft   50 mg, Oral, Daily      traMADol 50 MG tablet  Commonly known as: ULTRAM   50 mg, Oral, Every 6 Hours PRN         Stop These Medications    multivitamin with minerals tablet tablet            Allergies   Allergen Reactions   • Vancomycin Other (See Comments)     Acute Kidney Injury, requested by ID   • Baclofen Other (See Comments) and Hallucinations     PSYCHOSIS-POA REFUSES ADMINISTRATION OF THIS MED.   • Cephalexin Nausea Only   • Erythromycin Base Nausea Only   • Melatonin Other (See Comments)     Nightmares   • Oxycodone Nausea Only   • Protonix [Pantoprazole] Itching and Rash   • Sulfa Antibiotics Nausea Only         Discharge Disposition:  Hospice/Medical Facility (DC - External)    Diet:  Hospital:  Diet Order   Procedures   • " Diet: Regular/House Diet; Texture: Regular Texture (IDDSI 7); Fluid Consistency: Thin (IDDSI 0)       Activity:      Restrictions or Other Recommendations:  As tolerated       CODE STATUS:    Code Status and Medical Interventions:   Ordered at: 04/17/23 1800     Code Status (Patient has no pulse and is not breathing):    No CPR (Do Not Attempt to Resuscitate)     Medical Interventions (Patient has pulse or is breathing):    Full       Future Appointments   Date Time Provider Department Center   4/28/2023  3:00 PM EMS [facility]   5/5/2023 10:30 AM Arleen Doherty MD [facility]   5/25/2023  3:30 PM Billy Colvin MD [facility]   6/2/2023 12:30 PM Anjum Ceballos MD [facility]   11/15/2023  3:45 PM Eloy Marlow MD [facility]       Additional Instructions for the Follow-ups that You Need to Schedule     Discharge Follow-up with PCP   As directed       Currently Documented PCP:    Arleen Doherty MD    PCP Phone Number:    [phone]     Follow Up Details: Follow-up with PCP as needed                     Suze Maurer MD  04/28/23      Time Spent on Discharge:  I spent 45 minutes on this discharge activity which included: face-to-face encounter with the patient, reviewing the data in the system, coordination of the care with the nursing staff as well as consultants, documentation, and entering orders.

## 2023-04-28 NOTE — PROGRESS NOTES
Continued Stay Note  [facility]     Patient Name: Susan Anderson  MRN: [id number]  Today's Date: 4/28/2023    Admit Date: 4/17/2023    Plan: home with hospcie and caregivers   Discharge Plan     Row Name 04/28/23 0940       Plan    Plan home with hospcie and caregivers    Plan Comments Call made to pt daughter, Katie, at [phone]; no answer, VM left.  Plan is for pt to dc home with hospice services once 24h caregivers have been arranged.  Will follow.  Please, call [phone] if I can be of further assistance.    Update at 10:25- Call back received from Katie.  Katie reports that she is ready to take pt home today.  Pt to dc home ([address] [id number]) via private car today, April 28.  Daughter denies DME or supply needs currently stating that she would like to see how pt does before ordering- current O2 is supplied by [employer], will defer change of DME to hospice provided until all equipment needs are known.  5-day supply of meds to be sent home via [facility].  Hospice 24h number given to pt daughter who verbalized understanding to call once pt arrives home to be admitted to hospice services.  Dr. Maurer and RN updated.  Will send dc summary to hospice intake when available.     Update at 11:00- Ambulance scheduled for today at 15:00.  Will place PCS on chart,  to sign EMS/DNR. Daughter, Dr. Maurer and RN updated.                Discharge Codes    No documentation.               Expected Discharge Date and Time     Expected Discharge Date Expected Discharge Time    May 1, 2023             Ancemlo Leal RN

## 2023-04-28 NOTE — PLAN OF CARE
Problem: Adult Inpatient Plan of Care  Goal: Plan of Care Review  Outcome: Ongoing, Progressing  Goal: Patient-Specific Goal (Individualized)  Outcome: Ongoing, Progressing  Goal: Absence of Hospital-Acquired Illness or Injury  Outcome: Ongoing, Progressing  Intervention: Identify and Manage Fall Risk  Intervention: Prevent Skin Injury  Intervention: Prevent and Manage VTE (Venous Thromboembolism) Risk  Intervention: Prevent Infection  Goal: Optimal Comfort and Wellbeing  Outcome: Ongoing, Progressing  Intervention: Provide Person-Centered Care  Goal: Readiness for Transition of Care  Outcome: Ongoing, Progressing   Goal Outcome Evaluation:   Patient had no acute occurrences during shift  HR SB low 40's BP WNL mapping WNL - OCD notified 0000  Patient agitated/ irritable when awoken

## 2023-05-22 ENCOUNTER — TELEPHONE (OUTPATIENT)
Dept: INTERNAL MEDICINE | Facility: CLINIC | Age: 83
End: 2023-05-22
Payer: MEDICARE

## 2023-05-31 ENCOUNTER — TELEPHONE (OUTPATIENT)
Dept: CARDIAC SURGERY | Facility: CLINIC | Age: 83
End: 2023-05-31

## 2023-05-31 NOTE — TELEPHONE ENCOUNTER
Patient is scheduled to see Flory next week on 6/8/23. She would like to be referred for outpatient wound care for her bilateral foot ulcerations. She has been doing the dressing changes since she was discharged and feels she would benefit from outpatient wound care. Ok to make referral?

## 2023-05-31 NOTE — PROGRESS NOTES
[facility] Cardiology    Encounter Date: 2023    Patient ID: Susan Anderson is a 83 y.o. female.  : 1940     PCP: Arleen Doherty MD       Chief Complaint: Coronary artery disease involving native coronary       PROBLEM LIST:  Cardiomyopathy/acute on chronic combined systolic/diastolic CHF  Echo, 2021: EF 43%. Global hypokinesis. More pronounced in the anteroseptal wall and apex. Severe mitral annular calcification. Mild to moderate MR. Moderate sized left pleural effusion.  [facility], 2021: EF 40-45%. Mild to moderate nonobstructive CAD involving multiple vessels without any evidence of severe or critical disease.  Echo, 2022: EF 46-50%. Mild MR.    Echo, 2022: EF 55%. Mild MR. Aortic sclerosis without aortic stenosis or regurgitation.  Echo, 2022: EF 55%. Mild to moderate MR.   Echo, 2023: EF 41-45%. Mild MR. Trace TR. Moderate sized left pleural effusion.   Cardiac catheterization, 01/15/2023: Successful ultrasound-guided placement of 14 Norwegian double-lumen Schon right internal jugular vein temporary dialysis catheter.  Catheter placement was confirmed with portable chest x-ray in the Cath Lab, and the catheter is ready for immediate use.  Echo, 2023: EF 36-40%. Trace to mild mitral regurgitation,  left sided pleural effusion noted.   Nonobstructive coronary artery disease  C, 2021:  EF 40-45%. Mild to moderate nonobstructive CAD involving multiple vessels without any evidence of severe or critical disease.  Paroxysmal atrial fibrillation.  Peripheral vascular disease  Lower extremity duplex, 2022: Bilateral lower extremity arterial duplex exam reveals diffuse calcified plaque without focal obstruction up to the level of bilateral popliteals. Below knee vessels revealed similar findings with non occlusive calcified plaque in the peroneal and anterior tibial arteries with flow demonstrated to the level of ankles.  Bilateral posterior tibial flow could not be detected and is suggestive of probable occlusion. Bilateral ABIs are noncompressible due to vascular calcifications.  Hypertension   CKD, stage III  Renal artery duplex, 08/01/2022: Limited study due to the patient's body habitus and lack of cooperation. Elevated resistive indices which may reflect underlying intrinsic renal disease. No evidence of obstructive uropathy or significant renal artery stenosis.  HOMER, 04/19/2023: Right Conclusion: The right HOMER is unable to be assessed due to vessel incompressibility. Unable to assess digital ischemia.  Waveforms are difficult to assess due to A-fib and PVCs.  Reported palpable pulses. Left Conclusion: The left HOMER is unable to be assessed due to vessel incompressibility.  Possible severe digital ischemia. Waveforms are difficult to assess due to A-fib and PVCs.  Reported palpable pulses. Findings overall are inconclusive.  Alternative evaluation methods such as an arterial duplex ultrasound or CT angiogram with runoff could be considered if clinically warranted.  Type II diabetes mellitus   Osteomyelitis s/p right great toe amputation    History of Present Illness  Patient presents today for a follow-up with a history of coronary artery disease, nonischemic cardiomyopathy, paroxysmal atrial fibrillation, congestive heart failure, and cardiac risk factors.  She was recently hospitalized with decompensated CHF and worsening kidney disease.  She was discharged to home with hospice care however her condition has started to improve.  She was previously on 4 L of oxygen at home which she has now decreased the usage. Her family states that she has not taken Jardiance due to her poor kidney function. She is due to have updated lab work this evening. Patient denies current chest pain, shortness of breath, orthopnea, palpitations, edema, dizziness, and syncope.      Allergies   Allergen Reactions    Vancomycin Other (See Comments)      Acute Kidney Injury, requested by ID    Baclofen Other (See Comments) and Hallucinations     PSYCHOSIS-POA REFUSES ADMINISTRATION OF THIS MED.    Cephalexin Nausea Only    Erythromycin Base Nausea Only    Melatonin Other (See Comments)     Nightmares    Oxycodone Nausea Only    Protonix [Pantoprazole] Itching and Rash    Sulfa Antibiotics Nausea Only         Current Outpatient Medications:     acetaminophen (TYLENOL) 325 MG tablet, Take 2 tablets by mouth Every 6 (Six) Hours As Needed for Mild Pain., Disp: , Rfl:     apixaban (ELIQUIS) 2.5 MG tablet tablet, Take 1 tablet by mouth Every 12 (Twelve) Hours. Indications: Atrial Fibrillation, Disp: 60 tablet, Rfl: 0    atorvastatin (LIPITOR) 40 MG tablet, Take 1 tablet by mouth Daily., Disp: 30 tablet, Rfl: 6    bumetanide (BUMEX) 1 MG tablet, Take 1 tablet by mouth 2 (Two) Times a Day., Disp: 60 tablet, Rfl: 0    clopidogrel (PLAVIX) 75 MG tablet, Take 1 tablet by mouth Daily., Disp: 30 tablet, Rfl: 6    cyclobenzaprine (FLEXERIL) 5 MG tablet, Take 1 tablet by mouth 3 (Three) Times a Day As Needed for Muscle Spasms., Disp: 30 tablet, Rfl: 0    gabapentin (NEURONTIN) 100 MG capsule, Take 1 capsule by mouth 2 (Two) Times a Day., Disp: 90 capsule, Rfl: 0    glucose blood (Accu-Chek SmartView) test strip, 1 each by Other route 3 (Three) Times a Day. Use as instructed, Disp: 300 each, Rfl: 4    guaiFENesin (MUCINEX) 600 MG 12 hr tablet, Take 2 tablets by mouth 2 (Two) Times a Day As Needed for Congestion., Disp: , Rfl:     HYDROcodone-acetaminophen (NORCO) 5-325 MG per tablet, Take  by mouth., Disp: , Rfl:     hydrOXYzine (ATARAX) 25 MG tablet, Take 1 tablet by mouth At Night As Needed (insomnia / sleep)., Disp: 30 tablet, Rfl: 5    insulin glargine (Lantus) 100 UNIT/ML injection, Inject 3 Units under the skin into the appropriate area as directed Daily. (Patient taking differently: Inject 3 Units under the skin into the appropriate area as directed Daily. Sliding scale),  "Disp: 100 mL, Rfl: 0    insulin lispro (HumaLOG) 100 UNIT/ML injection, Sliding Scale Before Meals: Blood glucose 150-199 mg/dL - 2 units  Blood glucose 200-249 mg/dL - 3 units  Blood glucose 250-299 mg/dL - 4 units  Blood glucose 300-349 mg/dL - 5 units  Blood glucose 350-400 mg/dL - 6 units, Disp: 30 mL, Rfl: 3    Insulin Pen Needle (Pen Needles 3/16\") 31G X 5 MM misc, 1 each Daily., Disp: 300 each, Rfl: 3    Lancets misc, 1 each 3 (Three) Times a Day., Disp: 100 each, Rfl: 5    losartan (COZAAR) 50 MG tablet, Take 2 tablets by mouth Daily., Disp: , Rfl:     metoprolol succinate XL (TOPROL-XL) 25 MG 24 hr tablet, Take 1 tablet by mouth Daily., Disp: 30 tablet, Rfl: 5    omeprazole (priLOSEC) 20 MG capsule, Take 1 capsule by mouth 2 (Two) Times a Day., Disp: , Rfl:     ondansetron ODT (ZOFRAN-ODT) 4 MG disintegrating tablet, Place 1 tablet on the tongue Every 8 (Eight) Hours As Needed for Nausea or Vomiting., Disp: 14 tablet, Rfl: 0    polyethylene glycol (MIRALAX) 17 GM/SCOOP powder, Take 17 g by mouth Daily As Needed (constipation)., Disp: , Rfl:     sennosides-docusate (PERICOLACE) 8.6-50 MG per tablet, Take 1 tablet by mouth At Night As Needed for Constipation., Disp: , Rfl:     sertraline (Zoloft) 50 MG tablet, Take 1 tablet by mouth Daily., Disp: 30 tablet, Rfl: 3    The following portions of the patient's history were reviewed and updated as appropriate: allergies, current medications, past family history, past medical history, past social history, past surgical history and problem list.    ROS  Review of Systems   14 point ROS negative except for that listed in the HPI.         Objective:     /68 (BP Location: Left arm, Patient Position: Sitting)   Pulse 60   Ht 162.6 cm (64\")   Wt 69.4 kg (153 lb)   SpO2 98%   BMI 26.26 kg/m²      Physical Exam  Constitutional: Patient appears well-developed and well-nourished.   HENT: HEENT exam unremarkable.   Neck: Neck supple. No JVD present. No carotid " bruits.   Cardiovascular: Normal rate, regular rhythm and normal heart sounds. No murmur heard.   2+ symmetric pulses.   Pulmonary/Chest: Breath sounds normal. Does not exhibit tenderness.   Abdominal: Abdomen benign.   Musculoskeletal: Does not exhibit edema.   Neurological: Neurological exam unremarkable.   Vitals reviewed.    Data Review:   Lab Results   Component Value Date    GLUCOSE 152 (H) 04/27/2023    BUN 72 (H) 04/27/2023    CREATININE 1.99 (H) 04/27/2023    EGFR 24.4 (L) 04/27/2023    BCR 36.2 (H) 04/27/2023     (H) 04/27/2023    K 5.0 04/27/2023    CO2 32.0 (H) 04/27/2023    CALCIUM 8.8 04/27/2023    ALBUMIN 2.3 (L) 04/20/2023    AST 21 04/20/2023    ALT 11 04/20/2023     Lab Results   Component Value Date    CHOL 101 10/25/2022    TRIG 102 10/25/2022    HDL 37 (L) 10/25/2022    LDL 45 10/25/2022      Lab Results   Component Value Date    WBC 7.51 04/26/2023    RBC 3.90 04/26/2023    HGB 11.7 (L) 04/26/2023    HCT 39.6 04/26/2023    .5 (H) 04/26/2023     04/26/2023     Lab Results   Component Value Date    TSH 3.640 04/17/2023     Lab Results   Component Value Date    HGBA1C 8.30 (H) 03/28/2023        Procedures             Assessment:      Diagnosis Plan   1. Coronary artery disease involving native coronary artery of native heart without angina pectoris  Stable without angina on current activity. Continue on Plavix 75 mg daily for antiplatelet therapy.     2. NICM (nonischemic cardiomyopathy) and chronic HFrEF Stable and asymptomatic.  Clinically appears euvolemic, continue current management.     3. Paroxysmal atrial fibrillation  Stable and asymptomatic. Continue on Eliquis 2.5 mg BID for stroke prophylaxis.     4. Chronic combined systolic and diastolic heart failure  Stable and asymptomatic. Continue on Bumex 1 mg BID for fluid management.  Continue rest of the current medications.     5. Essential hypertension  Well controlled. Continue on losartan 100 mg and metoprolol 25 mg  daily for hypertension.     6. Dyslipidemia  Acceptable control. Continue on atorvastatin 40 mg daily for dyslipidemia.       Plan:   Stable cardiac status. No current angina and well compensated from CHF standpoint.    Continue current medications.   FU in 3 MO, sooner as needed.  Thank you for allowing us to participate in the care of your patient.       Scribed for Anjum Ceballos MD by Isabel Coley. 6/2/2023 13:11 EDT         I, Anjum Ceballos MD, personally performed the services described in this documentation as scribed by the above named individual in my presence, and it is both accurate and complete.  6/3/2023  10:49 EDT      Please note that portions of this note may have been completed with a voice recognition program. Efforts were made to edit the dictations, but occasionally words are mistranscribed.

## 2023-06-02 ENCOUNTER — OFFICE VISIT (OUTPATIENT)
Dept: CARDIOLOGY | Facility: CLINIC | Age: 83
End: 2023-06-02

## 2023-06-02 ENCOUNTER — LAB (OUTPATIENT)
Dept: LAB | Facility: HOSPITAL | Age: 83
End: 2023-06-02
Payer: MEDICARE

## 2023-06-02 ENCOUNTER — TRANSCRIBE ORDERS (OUTPATIENT)
Dept: LAB | Facility: HOSPITAL | Age: 83
End: 2023-06-02

## 2023-06-02 VITALS
WEIGHT: 153 LBS | HEART RATE: 60 BPM | OXYGEN SATURATION: 98 % | HEIGHT: 64 IN | BODY MASS INDEX: 26.12 KG/M2 | SYSTOLIC BLOOD PRESSURE: 124 MMHG | DIASTOLIC BLOOD PRESSURE: 68 MMHG

## 2023-06-02 DIAGNOSIS — E78.5 DYSLIPIDEMIA: ICD-10-CM

## 2023-06-02 DIAGNOSIS — I42.8 NICM (NONISCHEMIC CARDIOMYOPATHY): ICD-10-CM

## 2023-06-02 DIAGNOSIS — I48.0 PAROXYSMAL ATRIAL FIBRILLATION: ICD-10-CM

## 2023-06-02 DIAGNOSIS — I10 ESSENTIAL HYPERTENSION: ICD-10-CM

## 2023-06-02 DIAGNOSIS — I50.42 CHRONIC COMBINED SYSTOLIC AND DIASTOLIC HEART FAILURE: ICD-10-CM

## 2023-06-02 DIAGNOSIS — D64.9 ANEMIA, UNSPECIFIED TYPE: Primary | ICD-10-CM

## 2023-06-02 DIAGNOSIS — D64.9 ANEMIA, UNSPECIFIED TYPE: ICD-10-CM

## 2023-06-02 DIAGNOSIS — I25.10 CORONARY ARTERY DISEASE INVOLVING NATIVE CORONARY ARTERY OF NATIVE HEART WITHOUT ANGINA PECTORIS: Primary | ICD-10-CM

## 2023-06-02 LAB
ALBUMIN SERPL-MCNC: 2.9 G/DL (ref 3.5–5.2)
ANION GAP SERPL CALCULATED.3IONS-SCNC: 12 MMOL/L (ref 5–15)
BASOPHILS # BLD MANUAL: 0.08 10*3/MM3 (ref 0–0.2)
BASOPHILS NFR BLD MANUAL: 1 % (ref 0–1.5)
BUN SERPL-MCNC: 89 MG/DL (ref 8–23)
BUN/CREAT SERPL: 67.4 (ref 7–25)
CALCIUM SPEC-SCNC: 8.5 MG/DL (ref 8.6–10.5)
CHLORIDE SERPL-SCNC: 99 MMOL/L (ref 98–107)
CO2 SERPL-SCNC: 35 MMOL/L (ref 22–29)
CREAT SERPL-MCNC: 1.32 MG/DL (ref 0.57–1)
CREAT UR-MCNC: 35.7 MG/DL
DEPRECATED RDW RBC AUTO: 51.6 FL (ref 37–54)
EGFRCR SERPLBLD CKD-EPI 2021: 39.9 ML/MIN/1.73
EOSINOPHIL # BLD MANUAL: 0.08 10*3/MM3 (ref 0–0.4)
EOSINOPHIL NFR BLD MANUAL: 1 % (ref 0.3–6.2)
ERYTHROCYTE [DISTWIDTH] IN BLOOD BY AUTOMATED COUNT: 15.2 % (ref 12.3–15.4)
FERRITIN SERPL-MCNC: 139 NG/ML (ref 13–150)
GLUCOSE SERPL-MCNC: 230 MG/DL (ref 65–99)
HCT VFR BLD AUTO: 33.2 % (ref 34–46.6)
HGB BLD-MCNC: 10.6 G/DL (ref 12–15.9)
IRON 24H UR-MRATE: 48 MCG/DL (ref 37–145)
LYMPHOCYTES # BLD MANUAL: 0.85 10*3/MM3 (ref 0.7–3.1)
MCH RBC QN AUTO: 30.1 PG (ref 26.6–33)
MCHC RBC AUTO-ENTMCNC: 31.9 G/DL (ref 31.5–35.7)
MCV RBC AUTO: 94.3 FL (ref 79–97)
NEUTROPHILS # BLD AUTO: 7.35 10*3/MM3 (ref 1.7–7)
NEUTROPHILS NFR BLD MANUAL: 87.8 % (ref 42.7–76)
OVALOCYTES BLD QL SMEAR: ABNORMAL
PHOSPHATE SERPL-MCNC: 4.4 MG/DL (ref 2.5–4.5)
PLAT MORPH BLD: NORMAL
PLATELET # BLD AUTO: 284 10*3/MM3 (ref 140–450)
PMV BLD AUTO: 10.8 FL (ref 6–12)
POIKILOCYTOSIS BLD QL SMEAR: ABNORMAL
POTASSIUM SERPL-SCNC: 4.3 MMOL/L (ref 3.5–5.2)
PROT ?TM UR-MCNC: 130.7 MG/DL
PROT/CREAT UR: 3661.1 MG/G CREA (ref 0–200)
RBC # BLD AUTO: 3.52 10*6/MM3 (ref 3.77–5.28)
SODIUM SERPL-SCNC: 146 MMOL/L (ref 136–145)
VARIANT LYMPHS NFR BLD MANUAL: 10.2 % (ref 19.6–45.3)
WBC MORPH BLD: NORMAL
WBC NRBC COR # BLD: 8.37 10*3/MM3 (ref 3.4–10.8)

## 2023-06-02 PROCEDURE — 36415 COLL VENOUS BLD VENIPUNCTURE: CPT

## 2023-06-02 PROCEDURE — 80069 RENAL FUNCTION PANEL: CPT

## 2023-06-02 PROCEDURE — 85007 BL SMEAR W/DIFF WBC COUNT: CPT

## 2023-06-02 PROCEDURE — 85025 COMPLETE CBC W/AUTO DIFF WBC: CPT

## 2023-06-02 PROCEDURE — 82570 ASSAY OF URINE CREATININE: CPT

## 2023-06-02 PROCEDURE — 84156 ASSAY OF PROTEIN URINE: CPT

## 2023-06-02 PROCEDURE — 83540 ASSAY OF IRON: CPT

## 2023-06-02 PROCEDURE — 82728 ASSAY OF FERRITIN: CPT

## 2023-06-02 RX ORDER — HYDROCODONE BITARTRATE AND ACETAMINOPHEN 5; 325 MG/1; MG/1
TABLET ORAL
COMMUNITY
Start: 2023-04-29

## 2023-06-03 LAB
BH CV ECHO MEAS - AO MAX PG: 5.9 MMHG
BH CV ECHO MEAS - AO MEAN PG: 3.8 MMHG
BH CV ECHO MEAS - AO ROOT DIAM: 3 CM
BH CV ECHO MEAS - AO V2 MAX: 121.9 CM/SEC
BH CV ECHO MEAS - AO V2 VTI: 30.3 CM
BH CV ECHO MEAS - AVA(I,D): 1.49 CM2
BH CV ECHO MEAS - EDV(CUBED): 74.2 ML
BH CV ECHO MEAS - EDV(MOD-SP2): 63 ML
BH CV ECHO MEAS - EDV(MOD-SP4): 52 ML
BH CV ECHO MEAS - EF(MOD-BP): 38 %
BH CV ECHO MEAS - EF(MOD-SP2): 33.3 %
BH CV ECHO MEAS - EF(MOD-SP4): 38.5 %
BH CV ECHO MEAS - ESV(CUBED): 45 ML
BH CV ECHO MEAS - ESV(MOD-SP2): 42 ML
BH CV ECHO MEAS - ESV(MOD-SP4): 32 ML
BH CV ECHO MEAS - FS: 15.4 %
BH CV ECHO MEAS - IVS/LVPW: 1.11 CM
BH CV ECHO MEAS - IVSD: 1.13 CM
BH CV ECHO MEAS - LA DIMENSION: 3.6 CM
BH CV ECHO MEAS - LAT PEAK E' VEL: 7.5 CM/SEC
BH CV ECHO MEAS - LV MASS(C)D: 151.8 GRAMS
BH CV ECHO MEAS - LV MAX PG: 2.28 MMHG
BH CV ECHO MEAS - LV MEAN PG: 1.21 MMHG
BH CV ECHO MEAS - LV V1 MAX: 75.5 CM/SEC
BH CV ECHO MEAS - LV V1 VTI: 17.8 CM
BH CV ECHO MEAS - LVIDD: 4.2 CM
BH CV ECHO MEAS - LVIDS: 3.6 CM
BH CV ECHO MEAS - LVOT AREA: 2.5 CM2
BH CV ECHO MEAS - LVOT DIAM: 1.8 CM
BH CV ECHO MEAS - LVPWD: 1.02 CM
BH CV ECHO MEAS - MED PEAK E' VEL: 5.4 CM/SEC
BH CV ECHO MEAS - MV A MAX VEL: 94.3 CM/SEC
BH CV ECHO MEAS - MV DEC SLOPE: 432.8 CM/SEC2
BH CV ECHO MEAS - MV DEC TIME: 0.4 MSEC
BH CV ECHO MEAS - MV E MAX VEL: 66.6 CM/SEC
BH CV ECHO MEAS - MV E/A: 0.71
BH CV ECHO MEAS - MV P1/2T: 65.7 MSEC
BH CV ECHO MEAS - MVA(P1/2T): 3.3 CM2
BH CV ECHO MEAS - PA ACC SLOPE: 391.8 CM/SEC2
BH CV ECHO MEAS - PA ACC TIME: 0.14 SEC
BH CV ECHO MEAS - PA PR(ACCEL): 17.2 MMHG
BH CV ECHO MEAS - SV(LVOT): 45 ML
BH CV ECHO MEAS - SV(MOD-SP2): 21 ML
BH CV ECHO MEAS - SV(MOD-SP4): 20 ML
BH CV ECHO MEAS - TAPSE (>1.6): 2.1 CM
BH CV ECHO MEASUREMENTS AVERAGE E/E' RATIO: 10.33
LEFT ATRIUM VOLUME INDEX: 19.4 ML/M2
MAXIMAL PREDICTED HEART RATE: 136 BPM
STRESS TARGET HR: 116 BPM

## 2023-06-06 ENCOUNTER — HOSPITAL ENCOUNTER (OUTPATIENT)
Dept: PHYSICAL THERAPY | Facility: HOSPITAL | Age: 83
Setting detail: THERAPIES SERIES
Discharge: HOME | End: 2023-06-06
Payer: MEDICARE

## 2023-06-06 DIAGNOSIS — I73.9 PERIPHERAL VASCULAR DISEASE OF LOWER EXTREMITY: Primary | ICD-10-CM

## 2023-06-06 DIAGNOSIS — S91.302A NON-HEALING OPEN WOUND OF LEFT HEEL: ICD-10-CM

## 2023-06-06 DIAGNOSIS — S81.802A MULTIPLE OPEN WOUNDS OF LEFT LOWER LEG: ICD-10-CM

## 2023-06-06 DIAGNOSIS — S91.301A NON-HEALING OPEN WOUND OF RIGHT HEEL: ICD-10-CM

## 2023-06-06 DIAGNOSIS — S81.801A MULTIPLE OPEN WOUNDS OF RIGHT LOWER LEG: ICD-10-CM

## 2023-06-06 PROCEDURE — 97597 DBRDMT OPN WND 1ST 20 CM/<: CPT

## 2023-06-06 PROCEDURE — 97162 PT EVAL MOD COMPLEX 30 MIN: CPT

## 2023-06-06 NOTE — THERAPY EVALUATION
Outpatient Rehabilitation - Wound/Debridement Initial Eval  [facility]     Patient Name: Susan Anderson  : 1940  MRN: [id number]  Today's Date: 2023                  Admit Date: 2023    Visit Dx:    ICD-10-CM ICD-9-CM   1. Peripheral vascular disease of lower extremity  I73.9 443.9   2. Multiple open wounds of right lower leg  S81.801A 894.0   3. Multiple open wounds of left lower leg  S81.802A 894.0   4. Non-healing open wound of right heel  S91.301A 892.1   5. Non-healing open wound of left heel  S91.302A 892.1     R medial heel      L medial heel      R knee      L anterior leg      L lateral leg      R toes            Patient Active Problem List   Diagnosis    Diabetic foot ulcer    PVD (peripheral vascular disease) ([facility])    Osteomyelitis    Diabetes mellitus with neuropathy    Essential hypertension    Stage 3a chronic kidney disease    Acute on chronic systolic CHF (congestive heart failure)    Mitral valve disease    NICM (nonischemic cardiomyopathy) ([facility])    CAD    Dyslipidemia    Chronic combined systolic and diastolic heart failure     Lumbar stenosis with neurogenic claudication    Spondylolisthesis, lumbar region    Gait disturbance    Anemia    Sleep apnea    Paroxysmal atrial fibrillation    T2DM     Iron deficiency anemia, unspecified    Cellulitis of right foot due to methicillin-resistant Staphylococcus aureus    Acute respiratory failure with hypoxia and hypercarbia    Severe Malnutrition ([facility])        Past Medical History:   Diagnosis Date    Anemia     Anxiety     Arthritis     Asthma  - double pneumonia    Currently on inhaler and nebulizer    Atrial fibrillation 2022    Brain tumor     Benign and followed at     Cancer     cervical cancer, skin cancer    CHF (congestive heart failure) 2021    Chronic kidney disease Related to diabetes    Clotting disorder 10/22    Upper GI bleed from blood thinners aggravate ulcers    Coronary artery disease 2021 DX  for hear failure    COVID-19 01/28/2023    Depression 8/22    Diabetes mellitus 30 years    Seeing Dr. Lam 1st time Aug 19    Dizziness 07/27/2022    Effusion of right knee 08/12/2022    Fall 07/27/2022    GERD (gastroesophageal reflux disease)     Gout     History of medical problems 8/22    AFIB    History of staph infection 10/2021    right toe    History of transfusion     no reactions, 1 unit, [facility]    HL (hearing loss)     Acoustic neuroma right    Hx of colonoscopy     Hyperlipidemia Reference current labs x 2-3yrs approx    Hypertension 30 years    Hypomagnesemia 10/06/2022    Insomnia     Low back pain     Migraine     Mitral valve disease     Mitral valve disease     Osteomyelitis     Peptic ulceration 10/22    Secondary to blood thinners    Peripheral neuropathy     Physical deconditioning 05/17/2022    Pneumonia due to infectious organism 06/04/2021    Pressure injury of skin of sacral region 08/21/2022    Renal insufficiency 2021    Sepsis 01/28/2023    Sleep apnea     Syncope, unspecified syncope type 10/06/2022    Type 2 diabetes mellitus     30 years    Weakness 07/27/2022        Past Surgical History:   Procedure Laterality Date    ABDOMINAL HYSTERECTOMY W/SALPINGECTOMY      AMPUTATION  Right great toe, 1st 1/3 metatarsal -Reg 4/14/21    AORTAGRAM N/A 04/09/2021    Procedure: AORTAGRAM WITH OR WITHOUT RUNOFFS, WITH Co2;  Surgeon: Trey Forrest MD;  Location: [facility];  Service: Vascular;  Laterality: N/A;    AORTAGRAM N/A 09/28/2022    Procedure: CO2 ANGIOGRAM, LEFT SFA ANGIOPLASTY WITH DRUG ELUTING BALLOON, LEFT SFA STENT PLACEMENT ;  Surgeon: Trey Forrest MD;  Location: [facility];  Service: Vascular;  Laterality: N/A;  FLUORO: 13.12  DOSE 162mGy  CONTRAST: Isovue 350: 15ml    APPENDECTOMY      BRAIN TUMOR EXCISION      laser surgery     CARDIAC CATHETERIZATION N/A 06/21/2021    Procedure: LEFT HEART CATH;  Surgeon: Anjum Ceballos MD;  Location: [facility] CATH INVASIVE LOCATION;   Service: Cardiology;  Laterality: N/A;    CATARACT EXTRACTION W/ INTRAOCULAR LENS  IMPLANT, BILATERAL      CHOLECYSTECTOMY      COLONOSCOPY      ENDOSCOPY N/A 10/07/2022    Procedure: ESOPHAGOGASTRODUODENOSCOPY;  Surgeon: Stef Veras MD;  Location:  [facility];  Service: Gastroenterology;  Laterality: N/A;    EYE SURGERY      FEMORAL ARTERY STENT  10/2022    HYSTERECTOMY      INTERVENTIONAL RADIOLOGY PROCEDURE N/A 1/15/2023    Procedure: CV INTERVENTIONAL RADIOLOGY PROCEDURE;  Surgeon: Sanket Zapata MD;  Location:  [facility] CATH INVASIVE LOCATION;  Service: Interventional Radiology;  Laterality: N/A;    TOE SURGERY      TRANS METATARSAL AMPUTATION Right 04/14/2021    Procedure: AMPUTATION TRANS METATARSAL RIGHT GREAT TOE;  Surgeon: Valdez Finney MD;  Location:  [facility];  Service: Vascular;  Laterality: Right;        Patient History       Row Name 06/06/23 1500             History    Chief Complaint Ulcer, wound or other skin conditions;Pain;Swelling  -      Brief Description of Current Complaint Pt with ongoing chronic non-healing bilateral heel wounds along with multiple BLE traumatic wounds following recent  hospital admission of 4/17/23. Pt with history of severe PVD complicating wound healing.  -      Patient/Caregiver Goals Heal wound  -      How has patient tried to help current problem? Pt's daughter has been using therahoney and recently switched to hydrogel solution for the bilateral heel wounds although reports no success.  -         Services    Are you currently receiving Home Health services No  -      Do you plan to receive Home Health services in the near future No  -         Daily Activities    Primary Language English  -      How does patient learn best? Listening;Reading;Demonstration  -      Teaching needs identified Management of Condition  -      Pt Participated in POC and Goals Yes  -                User Key  (r) = Recorded By, (t) = Taken By, (c) = Cosigned By  "     Initials Name Provider Type     López Graham, PT Physical Therapist                    EVALUATION   PT Ortho       Row Name 06/06/23 1500       Subjective Comments    Subjective Comments Pt/daughter reports pt is not on abx and reports they have been told nothing can be done for her PVD. Reports she has a follow up with Dr. Finney's office and Dr. Forrest on thursday.  -LH       Subjective Pain    Able to rate subjective pain? yes  -LH    Pre-Treatment Pain Level 2  -LH    Post-Treatment Pain Level 6  -LH    Subjective Pain Comment Severe increase in bilateral heel pain with debridement/light touch  -       Transfers    Comment, (Transfers) Seated in transport chair for tx  -              User Key  (r) = Recorded By, (t) = Taken By, (c) = Cosigned By      Initials Name Provider Type     López Graham, PT Physical Therapist                   [facility]DA Wound       Row Name 06/06/23 1500             Wound 04/11/23 1300 Right anterior knee Skin Tear    Wound - Properties Group Placement Date: 04/11/23  - Placement Time: 1300  - Present on Hospital Admission: N  - Side: Right  - Orientation: anterior  - Location: knee  - Primary Wound Type: Skin tear  -MC    Wound Image Images linked: 1  -LH      Dressing Appearance intact;moist drainage;other (see comments)  4\"optifoam  -      Base moist;pink;red;yellow;slough;other (see comments)  partially approximated skin flap, skin flap with questionable viability  -      Periwound intact;dry;ecchymotic  -      Periwound Temperature warm  -      Periwound Skin Turgor soft  -      Edges irregular;open  -      Wound Length (cm) 0.5 cm  -      Wound Width (cm) 2.5 cm  -      Wound Depth (cm) 0.1 cm  -      Wound Surface Area (cm^2) 1.25 cm^2  -LH      Wound Volume (cm^3) 0.125 cm^3  -LH      Drainage Characteristics/Odor sanguineous  -      Drainage Amount small;other (see comments)  seeping bleeding  -      Care, Wound cleansed with;soap " "and water;debrided  -      Dressing Care dressing applied;silver impregnated;collagen;low-adherent;foam;border dressing  Tina Ag, Mepilex Ag, 4\" optifoam  -      Periwound Care cleansed with pH balanced cleanser;dry periwound area maintained;other (see comments)  NoSting  -      Retired Wound - Properties Group Placement Date: 04/11/23  - Placement Time: 1300  -MC Present on Hospital Admission: N  -MC Side: Right  - Orientation: anterior  -MC Location: knee  -MC Primary Wound Type: Skin tear  -MC    Retired Wound - Properties Group Date first assessed: 04/11/23  - Time first assessed: 1300  -MC Present on Hospital Admission: N  -MC Side: Right  - Location: knee  -MC Primary Wound Type: Skin tear  -MC       Wound 04/11/23 1300 Right lateral fourth toe Soft Tissue Necrosis    Wound - Properties Group Placement Date: 04/11/23  - Placement Time: 1300  - Present on Hospital Admission: N  - Side: Right  - Orientation: lateral  - Location: fourth toe  -MC Primary Wound Type: Soft tissue  -MC    Wound Image Images linked: 2  -LH      Dressing Appearance open to air  betadine only  -      Base dry;scab;black eschar  -      Periwound intact;dry  -      Periwound Temperature warm  -      Periwound Skin Turgor soft  -      Edges irregular;open  -LH      Drainage Amount none  -      Retired Wound - Properties Group Placement Date: 04/11/23  - Placement Time: 1300  -MC Present on Hospital Admission: N  -MC Side: Right  - Orientation: lateral  - Location: fourth toe  -MC Primary Wound Type: Soft tissue  -MC    Retired Wound - Properties Group Date first assessed: 04/11/23  - Time first assessed: 1300  -MC Present on Hospital Admission: N  - Side: Right  - Location: fourth toe  -MC Primary Wound Type: Soft tissue  -MC       Wound 03/31/23 1345 Right medial heel Fissure    Wound - Properties Group Placement Date: 03/31/23  - Placement Time: 1345  -JM Side: Right  - Orientation: " "medial  - Location: heel  -JM Primary Wound Type: Fissure  -JM    Wound Image Images linked: 1  -LH      Dressing Appearance intact;moist drainage  -LH      Base moist;yellow;slough;pink;red;non-granulating  central necrotic with black eschar, surrounding red partial-thickness open area  -      Black (%), Wound Tissue Color 80  -LH      Yellow (%), Wound Tissue Color 20  -LH      Periwound intact;dry;redness;blanchable;warm  -      Periwound Temperature warm  -      Periwound Skin Turgor soft  -      Edges irregular;open  -      Wound Length (cm) 2.5 cm  -      Wound Width (cm) 3.5 cm  -      Wound Depth (cm) --  obscured  -      Wound Surface Area (cm^2) 8.75 cm^2  -      Drainage Characteristics/Odor serosanguineous;yellow;brown;malodorous;other (see comments)  mild malodor  -      Drainage Amount small  -      Care, Wound cleansed with;soap and water;debrided;honey applied  -      Dressing Care dressing applied;low-adherent;foam;border dressing  Therahoney, xeroform, 4\" optifoam, primafix tape  -      Periwound Care cleansed with pH balanced cleanser;dry periwound area maintained  NoSting  -      Retired Wound - Properties Group Placement Date: 03/31/23  - Placement Time: 1345 -JM Side: Right  - Orientation: medial  - Location: heel  -JM Primary Wound Type: Fissure  -JM    Retired Wound - Properties Group Date first assessed: 03/31/23  - Time first assessed: 1345  - Side: Right  - Location: heel  -JM Primary Wound Type: Fissure  -JM       Wound 01/28/23 1613 Left posterior heel Pressure Injury    Wound - Properties Group Placement Date: 01/28/23  -WR Placement Time: 1613  -WR Present on Hospital Admission: Y  -WR Side: Left  -WR Orientation: posterior  -WR Location: heel  -WR Primary Wound Type: Pressure inj  -WR    Wound Image Images linked: 1  -LH      Dressing Appearance intact;moist drainage  -LH      Base moist;necrotic;yellow;slough;pink;red;non-granulating  moist " "fibrous slough  -      Yellow (%), Wound Tissue Color 100  -LH      Periwound intact;blanchable;pink;macerated  -      Periwound Temperature warm  -      Periwound Skin Turgor soft  -      Edges open  -      Wound Length (cm) 3.2 cm  -      Wound Width (cm) 3.7 cm  -      Wound Depth (cm) --  obscured  -      Wound Surface Area (cm^2) 11.84 cm^2  -      Drainage Characteristics/Odor serosanguineous;yellow;malodorous;tan  -LH      Drainage Amount small  -      Care, Wound cleansed with;soap and water;debrided;honey applied  -      Dressing Care dressing applied;low-adherent;foam;border dressing  Therahoney, xeroform, 4\" optifoam  -      Periwound Care cleansed with pH balanced cleanser;dry periwound area maintained;other (see comments)  NoSting  -      Retired Wound - Properties Group Placement Date: 01/28/23  -WR Placement Time: 1613 -WR Present on Hospital Admission: Y  -WR Side: Left  -WR Orientation: posterior  -WR Location: heel  -WR Primary Wound Type: Pressure inj  -WR    Retired Wound - Properties Group Date first assessed: 01/28/23  -WR Time first assessed: 1613  -WR Present on Hospital Admission: Y  -WR Side: Left  -WR Location: heel  -WR Primary Wound Type: Pressure inj  -WR       Wound 06/06/23 Left lower leg Skin Tear    Wound - Properties Group Placement Date: 06/06/23  - Present on Hospital Admission: Y  -LH Side: Left  -LH Orientation: lower  -LH Location: leg  -LH Primary Wound Type: Skin tear  -LH    Retired Wound - Properties Group Placement Date: 06/06/23  - Present on Hospital Admission: Y  -LH Side: Left  -LH Orientation: lower  -LH Location: leg  -LH Primary Wound Type: Skin tear  -LH    Retired Wound - Properties Group Date first assessed: 06/06/23  - Present on Hospital Admission: Y  -LH Side: Left  -LH Location: leg  -LH Primary Wound Type: Skin tear  -LH              User Key  (r) = Recorded By, (t) = Taken By, (c) = Cosigned By      Initials Name Provider " Type    Lucila Mohan, PT Physical Therapist    Shi Mane, PT Physical Therapist    Aditi Shafer, RN Registered Nurse     López Graham, PT Physical Therapist                      WOUND DEBRIDEMENT  Total area of Debridement: 6cm2  Debridement Site 1  Location- Site 1: L heel  Selective Debridement- Site 1: Wound Surface <20cmsq  Instruments- Site 1: #15, scapel, tweezers  Excised Tissue Description- Site 1: minimum, slough  Bleeding- Site 1: none   Debridement Site 2  Location- Site 2: R heel  Selective Debridement- Site 2: Wound Surface <20cmsq  Instruments- Site 2: tweezers, scissors  Excised Tissue Description- Site 2: minimum, slough, eschar  Bleeding- Site 2: none   Debridement Site 3  Location- Site 3: BLE  Selective Debridement- Site 3: Wound Surface <20cmsq  Instruments- Site 3: tweezers  Excised Tissue Description- Site 3: minimum, slough  Bleeding- Site 3: none      Therapy Education       Row Name 06/06/23 1600             Therapy Education    Education Details Reviewed s/sx of infection and when to seek medical attention. Keep wound dressings intact for 2-3 days. Therahoney and xeroform to B heels although if skin turns white discontinue xeroform. Cover skin tears with mepilex and spandage to reduce further skin tears/trauma.  -      Given Symptoms/condition management;Bandaging/dressing change  -      Program Modified;New  -      How Provided Verbal;Demonstration  -      Provided to Patient;Caregiver  -      Level of Understanding Verbalized;Teach back education performed  -                User Key  (r) = Recorded By, (t) = Taken By, (c) = Cosigned By      Initials Name Provider Type     López Graham, PT Physical Therapist                    Recommendation and Plan   PT Assessment/Plan       Row Name 06/06/23 1600          PT Assessment    Functional Limitations Performance in self-care ADL;Impaired gait;Other (comment)  wound management limitations   -     Impairments Integumentary integrity;Pain;Impaired sensory integrity  -     Assessment Comments Pt well known to  OP wound care for chronic bilateral non-heeling medial heel ulcerations complicated by severe PVD and neuropathy. Pt's bilateral heel wounds have considerably increased in dimensions since previously measured and are both fully covered in firmly adhered slough/eschar. No viable tissue or granulation noted at wound base. Debridement grossly limited d/t pt complaints of pain. Of note pt with very poor, fragile BLE skin integrity with multiple BLE skin tears requiring advanced wound dressing management along with mild BLE edema. Pt would continue to benefit from skilled wound care for further debridement and advanced wound dressings to promote wound healing.  -     Rehab Potential Fair  -     Patient/caregiver participated in establishment of treatment plan and goals Yes  -     Patient would benefit from skilled therapy intervention Yes  -        PT Plan    PT Frequency 2x/week  -     Predicted Duration of Therapy Intervention (PT) 20 visits  -     Planned CPT's? PT EVAL MOD COMPLELITY: 74532;PT SELF CARE/MGMT/TRAIN 15 MIN: 67202;PT NONSELECT DEBRIDE 15 MIN: 65175;PT PIPE DEBRIDE OPEN WOUND UP TO 20 CM: 44647;PT PIPE DEBRIDE OPEN WOUND EA ADD 20 CM: 90131;PT MULTI LAYER COMP SYS LE  -     Physical Therapy Interventions (Optional Details) wound care;patient/family education  -     PT Plan Comments debridement, MLW PRN  -               User Key  (r) = Recorded By, (t) = Taken By, (c) = Cosigned By      Initials Name Provider Type     López Graham, PT Physical Therapist                      Goals   PT OP Goals       Row Name 23 1611 23 1600       PT Short Term Goals    STG 1 -- Decrease B heel necrotic tissue to <75% to improve wound healing potential.  -    STG 1 Progress -- New  -    STG 2 -- Decrease bilateral heel wound dimensions by 10% as evidence  of wound healing.  -    STG 2 Progress -- New  -    STG 3 -- Demonstrate no remaining BLE skin tears.  -    STG 3 Progress -- New  -       Long Term Goals    LTG 1 -- Pt/family independent with home dressing changes.  -    LTG 1 Progress -- New  -    LTG 2 -- Demonstrate <50% necrotic tissue at B heels to improve wound healing potential.  -    LTG 2 Progress -- New  -    LTG 3 -- Decrease bilateral heel wound dimensions by 50% as evidence of wound healing.  -    LTG 3 Progress -- New  -       Time Calculation    PT Goal Re-Cert Due Date 09/04/23  - 09/04/23  -              User Key  (r) = Recorded By, (t) = Taken By, (c) = Cosigned By      Initials Name Provider Type     López Graham, PT Physical Therapist                    Time Calculation: Start Time: 1300  Untimed Charges  PT Eval/Re-eval Minutes: 40  Wound Care: 99277 Selective debridement  32894-Ylikybaro debridement: 35  Total Minutes  Untimed Charges Total Minutes: 75   Total Minutes: 75  Therapy Charges for Today       Code Description Service Date Service Provider Modifiers Qty    35912549461 HC PT EVAL MOD COMPLEXITY 3 6/6/2023 López Graham, PT GP 1    [id number] HC PIPE DEBRIDE OPEN WOUND UP TO 20CM 6/6/2023 López Graham, PT GP 1                  López Graham PT  6/6/2023

## 2023-06-08 ENCOUNTER — OFFICE VISIT (OUTPATIENT)
Dept: CARDIAC SURGERY | Facility: CLINIC | Age: 83
End: 2023-06-08
Payer: MEDICARE

## 2023-06-08 VITALS
DIASTOLIC BLOOD PRESSURE: 60 MMHG | OXYGEN SATURATION: 96 % | BODY MASS INDEX: 27.11 KG/M2 | WEIGHT: 153 LBS | HEIGHT: 63 IN | HEART RATE: 56 BPM | SYSTOLIC BLOOD PRESSURE: 140 MMHG | TEMPERATURE: 98 F

## 2023-06-08 DIAGNOSIS — L97.418: ICD-10-CM

## 2023-06-08 DIAGNOSIS — I73.9 PVD (PERIPHERAL VASCULAR DISEASE): Primary | ICD-10-CM

## 2023-06-08 DIAGNOSIS — E08.621: ICD-10-CM

## 2023-06-08 NOTE — PROGRESS NOTES
[facility] Cardiothoracic Surgery Office Follow Up Note    Date of Encounter: 2023     Name: Susan Anderson  : 1940     Referred By: No ref. provider found  PCP: Arleen Doherty MD    Chief Complaint:    Chief Complaint   Patient presents with   • Follow-up     FU for Bilateral Heel Ulcerations       Subjective      History of Present Illness:    It was nice to see Susan Anderson in follow up.  She is a pleasant 82 y.o. female with extensive PMH significant for hypertension, coronary artery disease, dyslipidemia, cardiomyopathy, peripheral vascular disease, type 2 diabetes with neuropathy, stage IIIa chronic kidney disease, and nonhealing diabetic foot ulcer..       Patient is s/p aortogram with left lower extremity runoff using CO2 contrast, left superficial femoral artery angioplasty and stent placement, and completion arteriogram by Dr. Forrest and Dr. Finney on 2022.  See op report for full details.  Patient was eventually discharged home on antibiotics per infectious disease.     Ms. Anderson was last seen in office by myself on 3/22/2023 accompanied by her daughter.  Patient had been following with wound care for a left heel ulcer.  Her daughter contacted the office on 3/21/2023 requesting patient be seen earlier due to concern of her left heel ulcer.  PT wound care had mentioned arterial studies to patient's daughter.  This was also mentioned during office visit by patient's daughter.  Patient had quite an extensive course over the previous couple of months which included cardiac arrest while having an endoscopy in January.  She then had to be placed on dialysis for acute kidney injury and developed COVID on top of this.  Upon examination pulses were obtainable by Doppler.  There was no mottling or concerning discoloration.  The left heel ulcer was absent of necrosis, drainage, or foul odor.  I later received a message from PT wound care again asking about patient's last arterial  studies.  I discussed this with Dr. Forrest and he agreed based on assessment findings, an arterial Doppler study was not warranted.  I discussed this with patient by phone and offered arterial Doppler studies if patient did not feel comfortable with our decision and wished to proceed.  Patient stated she did not find it necessary as well and agreed to continue with current treatment plan.    On 4/18/2023 our service was consulted as Ms. Anderson had been admitted for her peripheral arterial disease and nonhealing bilateral heel ulcerations along with heart failure.  There were no plans for surgical intervention made during this admission.  Patient was at one point placed on hospice but later retracted this.  Ms. Anderson presents to office today accompanied by her daughter.  She denies fever or chills.  Her daughter states she has improved since being discharged from the hospital.  She continues to follow with [facility] wound care and infectious disease, Dr. Soares.       Review of Systems:  Review of Systems   Constitutional: Positive for decreased appetite and weight loss (gradual). Negative for chills, diaphoresis, fever, malaise/fatigue, night sweats and weight gain.   HENT:  Negative for hoarse voice.    Eyes:  Negative for blurred vision, double vision and visual disturbance.   Cardiovascular:  Negative for chest pain, claudication, dyspnea on exertion, irregular heartbeat, leg swelling, near-syncope, orthopnea, palpitations, paroxysmal nocturnal dyspnea and syncope.   Respiratory:  Negative for cough, hemoptysis, shortness of breath, sputum production and wheezing.    Hematologic/Lymphatic: Negative for adenopathy and bleeding problem. Bruises/bleeds easily.   Skin:  Positive for poor wound healing. Negative for color change, nail changes and rash.   Musculoskeletal:  Positive for joint pain (left shoulder). Negative for back pain, falls and muscle cramps.   Gastrointestinal:  Negative for abdominal pain,  dysphagia and heartburn.   Genitourinary:  Negative for flank pain.   Neurological:  Positive for dizziness. Negative for brief paralysis, disturbances in coordination, focal weakness, headaches, light-headedness, loss of balance, numbness, paresthesias, sensory change, vertigo and weakness.   Psychiatric/Behavioral:  Negative for depression and suicidal ideas.    Allergic/Immunologic: Negative for persistent infections.     I have reviewed the following portions of the patient's history: problem list, current medications, allergies, past surgical history, past medical history, past social history, past family history, and ROS and confirm it's accurate.    Allergies:  Allergies   Allergen Reactions   • Vancomycin Other (See Comments)     Acute Kidney Injury, requested by ID   • Baclofen Other (See Comments) and Hallucinations     PSYCHOSIS-POA REFUSES ADMINISTRATION OF THIS MED.   • Cephalexin Nausea Only   • Erythromycin Base Nausea Only   • Melatonin Other (See Comments)     Nightmares   • Oxycodone Itching and Nausea Only   • Protonix [Pantoprazole] Itching and Rash   • Sulfa Antibiotics Nausea Only       Medications:      Current Outpatient Medications:   •  acetaminophen (TYLENOL) 325 MG tablet, Take 2 tablets by mouth Every 6 (Six) Hours As Needed for Mild Pain., Disp: , Rfl:   •  apixaban (ELIQUIS) 2.5 MG tablet tablet, Take 1 tablet by mouth Every 12 (Twelve) Hours. Indications: Atrial Fibrillation, Disp: 60 tablet, Rfl: 0  •  atorvastatin (LIPITOR) 40 MG tablet, Take 1 tablet by mouth Daily., Disp: 30 tablet, Rfl: 6  •  bumetanide (BUMEX) 1 MG tablet, Take 1 tablet by mouth 2 (Two) Times a Day., Disp: 60 tablet, Rfl: 0  •  clopidogrel (PLAVIX) 75 MG tablet, Take 1 tablet by mouth Daily., Disp: 30 tablet, Rfl: 6  •  cyclobenzaprine (FLEXERIL) 5 MG tablet, Take 1 tablet by mouth 3 (Three) Times a Day As Needed for Muscle Spasms., Disp: 30 tablet, Rfl: 0  •  gabapentin (NEURONTIN) 100 MG capsule, Take 1  "capsule by mouth 2 (Two) Times a Day., Disp: 90 capsule, Rfl: 0  •  glucose blood (Accu-Chek SmartView) test strip, 1 each by Other route 3 (Three) Times a Day. Use as instructed, Disp: 300 each, Rfl: 4  •  guaiFENesin (MUCINEX) 600 MG 12 hr tablet, Take 2 tablets by mouth 2 (Two) Times a Day As Needed for Congestion. (Patient taking differently: Take 2 tablets by mouth As Needed for Congestion.), Disp: , Rfl:   •  HYDROcodone-acetaminophen (NORCO) 5-325 MG per tablet, Take  by mouth., Disp: , Rfl:   •  hydrOXYzine (ATARAX) 25 MG tablet, Take 1 tablet by mouth At Night As Needed (insomnia / sleep)., Disp: 30 tablet, Rfl: 5  •  insulin glargine (Lantus) 100 UNIT/ML injection, Inject 3 Units under the skin into the appropriate area as directed Daily. (Patient taking differently: Inject 3 Units under the skin into the appropriate area as directed Daily. Sliding scale), Disp: 100 mL, Rfl: 0  •  insulin lispro (HumaLOG) 100 UNIT/ML injection, Sliding Scale Before Meals: Blood glucose 150-199 mg/dL - 2 units  Blood glucose 200-249 mg/dL - 3 units  Blood glucose 250-299 mg/dL - 4 units  Blood glucose 300-349 mg/dL - 5 units  Blood glucose 350-400 mg/dL - 6 units, Disp: 30 mL, Rfl: 3  •  Insulin Pen Needle (Pen Needles 3/16\") 31G X 5 MM misc, 1 each Daily., Disp: 300 each, Rfl: 3  •  Lancets misc, 1 each 3 (Three) Times a Day., Disp: 100 each, Rfl: 5  •  losartan (COZAAR) 50 MG tablet, Take 2 tablets by mouth Daily., Disp: , Rfl:   •  metoprolol succinate XL (TOPROL-XL) 25 MG 24 hr tablet, Take 1 tablet by mouth Daily., Disp: 30 tablet, Rfl: 5  •  Nutritional Supplements (GRISELDA PO), Take  by mouth As Needed. Nutritional Drink, Disp: , Rfl:   •  omeprazole (priLOSEC) 20 MG capsule, Take 1 capsule by mouth 2 (Two) Times a Day., Disp: , Rfl:   •  ondansetron ODT (ZOFRAN-ODT) 4 MG disintegrating tablet, Place 1 tablet on the tongue Every 8 (Eight) Hours As Needed for Nausea or Vomiting., Disp: 14 tablet, Rfl: 0  •  polyethylene " glycol (MIRALAX) 17 GM/SCOOP powder, Take 17 g by mouth Daily As Needed (constipation)., Disp: , Rfl:   •  sennosides-docusate (PERICOLACE) 8.6-50 MG per tablet, Take 1 tablet by mouth At Night As Needed for Constipation., Disp: , Rfl:   •  sertraline (Zoloft) 50 MG tablet, Take 1 tablet by mouth Daily., Disp: 30 tablet, Rfl: 3    History:   Past Medical History:   Diagnosis Date   • Anemia 8/22   • Anxiety    • Arthritis    • Asthma 6/4 - double pneumonia    Currently on inhaler and nebulizer   • Atrial fibrillation 08/21/2022   • Brain tumor     Benign and followed at    • Cancer     cervical cancer, skin cancer   • CHF (congestive heart failure) 06/04/2021   • Chronic kidney disease Related to diabetes   • Clotting disorder 10/22    Upper GI bleed from blood thinners aggravate ulcers   • Coronary artery disease 6/4/2021 DX for hear failure   • COVID-19 01/28/2023   • Depression 8/22   • Diabetes mellitus 30 years    Seeing Dr. Lam 1st time Aug 19   • Dizziness 07/27/2022   • Effusion of right knee 08/12/2022   • Fall 07/27/2022   • GERD (gastroesophageal reflux disease)    • Gout    • History of medical problems 8/22    AFIB   • History of staph infection 10/2021    right toe   • History of transfusion     no reactions, 1 unit, [facility]   • HL (hearing loss)     Acoustic neuroma right   • Hx of colonoscopy    • Hyperlipidemia Reference current labs x 2-3yrs approx   • Hypertension 30 years   • Hypomagnesemia 10/06/2022   • Insomnia    • Low back pain    • Migraine    • Mitral valve disease    • Mitral valve disease    • Osteomyelitis    • Peptic ulceration 10/22    Secondary to blood thinners   • Peripheral neuropathy    • Physical deconditioning 05/17/2022   • Pneumonia due to infectious organism 06/04/2021   • Pressure injury of skin of sacral region 08/21/2022   • Renal insufficiency 2021   • Sepsis 01/28/2023   • Sleep apnea    • Syncope, unspecified syncope type 10/06/2022   • Type 2 diabetes mellitus     30  years   • Weakness 07/27/2022       Past Surgical History:   Procedure Laterality Date   • ABDOMINAL HYSTERECTOMY W/SALPINGECTOMY     • AMPUTATION  Right great toe, 1st 1/3 metatarsal -[facility] 4/14/21   • AORTAGRAM N/A 04/09/2021    Procedure: AORTAGRAM WITH OR WITHOUT RUNOFFS, WITH Co2;  Surgeon: Trey Forrest MD;  Location:  [facility];  Service: Vascular;  Laterality: N/A;   • AORTAGRAM N/A 09/28/2022    Procedure: CO2 ANGIOGRAM, LEFT SFA ANGIOPLASTY WITH DRUG ELUTING BALLOON, LEFT SFA STENT PLACEMENT ;  Surgeon: Trey Forrest MD;  Location:  [facility];  Service: Vascular;  Laterality: N/A;  FLUORO: 13.12  DOSE 162mGy  CONTRAST: Isovue 350: 15ml   • APPENDECTOMY     • BRAIN TUMOR EXCISION      laser surgery    • CARDIAC CATHETERIZATION N/A 06/21/2021    Procedure: LEFT HEART CATH;  Surgeon: Anjum Ceballos MD;  Location:  [facility];  Service: Cardiology;  Laterality: N/A;   • CATARACT EXTRACTION W/ INTRAOCULAR LENS  IMPLANT, BILATERAL     • CHOLECYSTECTOMY     • COLONOSCOPY     • ENDOSCOPY N/A 10/07/2022    Procedure: ESOPHAGOGASTRODUODENOSCOPY;  Surgeon: Stef Veras MD;  Location:  [facility];  Service: Gastroenterology;  Laterality: N/A;   • EYE SURGERY     • FEMORAL ARTERY STENT  10/2022   • HYSTERECTOMY     • INTERVENTIONAL RADIOLOGY PROCEDURE N/A 1/15/2023    Procedure: CV INTERVENTIONAL RADIOLOGY PROCEDURE;  Surgeon: Sanket Zapata MD;  Location:  [facility];  Service: Interventional Radiology;  Laterality: N/A;   • TOE SURGERY     • TRANS METATARSAL AMPUTATION Right 04/14/2021    Procedure: AMPUTATION TRANS METATARSAL RIGHT GREAT TOE;  Surgeon: Valdez Finney MD;  Location:  [facility];  Service: Vascular;  Laterality: Right;       Social History     Socioeconomic History   • Marital status:    • Number of children: 1   Tobacco Use   • Smoking status: Never     Passive exposure: Past   • Smokeless tobacco: Never   Vaping Use   • Vaping Use:  "Never used   Substance and Sexual Activity   • Alcohol use: Not Currently     Comment: Social drinking when not recovering from hospitalization   • Drug use: Never   • Sexual activity: Not Currently     Birth control/protection: None        Family History   Problem Relation Age of Onset   • Diabetes Mother         Type II   • Heart disease Father    • Heart attack Father    • Coronary artery disease Father    • Diabetes Father         Type II   • Diabetes Sister    • Diabetes Maternal Grandmother    • Diabetes Maternal Grandfather    • Diabetes Paternal Grandmother    • Diabetes Paternal Grandfather        Objective     Physical Exam:  Vitals:    06/08/23 1429   BP: 140/60   BP Location: Left arm   Patient Position: Sitting   Pulse: 56   Temp: 98 °F (36.7 °C)   SpO2: 96%   Weight: 69.4 kg (153 lb)   Height: 160 cm (63\")  Comment: patient reported      Body mass index is 27.1 kg/m².    Physical Exam  Vitals reviewed.   Constitutional:       Comments: Wheelchair   Pulmonary:      Effort: Pulmonary effort is normal.   Neurological:      General: No focal deficit present.      Mental Status: She is alert and oriented to person, place, and time.   Psychiatric:         Mood and Affect: Mood normal.         Behavior: Behavior normal.         Thought Content: Thought content normal.         Judgment: Judgment normal.     Imaging/Labs:         Assessment / Plan      Assessment / Plan:  PMH significant for hypertension, coronary artery disease, dyslipidemia, cardiomyopathy, peripheral vascular disease, type 2 diabetes with neuropathy, stage IIIa chronic kidney disease, and nonhealing diabetic foot ulcer..      1.  Peripheral vascular disease  2.  Diabetic neuropathy  3.  Nonhealing diabetic foot ulcer  S/p aortogram with left lower extremity runoff using CO2 contrast, left superficial femoral artery angioplasty and stent placement, and completion arteriogram by Dr. Forrest and Dr. Finney on 9/28/2022.  Last seen in office by " myself on 3/22/2023 accompanied by her daughter.  Had been following with wound care for a left heel ulcer.  Her daughter contacted the office on 3/21/2023 requesting patient be seen earlier due to concern of her left heel ulcer.  PT wound care had mentioned arterial studies to patient's daughter  Patient had quite an extensive course over the previous couple of months which included cardiac arrest while having an endoscopy in January.  Was placed on dialysis for acute kidney injury and developed COVID-19 on top of this.  Upon examination pulses were obtainable by Doppler.  There was no mottling or concerning discoloration.  The left heel ulcer was absent of necrosis, drainage, or foul odor.  Patient was reviewed with Dr. Forrest and he agreed based on assessment findings an arterial Doppler study was not warranted.  I discussed this with patient by phone and offered arterial Doppler studies if she did not feel comfortable with her decision.  Patient stated she did not find it necessary as well.    On 4/18/2023 our service was consulted as Ms. Anderson has been admitted for her peripheral arterial disease and nonhealing bilateral heel ulcerations along with heart failure.  There were no plans for surgical intervention made during this admission.  Patient was at one-point placed on hospice but later retracted this.  Presents to office today accompanied by her daughter.  Denies fever or chills.  Her daughter states she has improved since being discharged from the hospital.  Continues to follow with [facility] wound care and infectious disease, Dr. Soares.  No intervention from a vascular standpoint at this time.  Continue with wound care    Patient Education:  nd pat dry.    Do no apply any lotions, creams, oils, powders, salves, or ointments directly to incisional sites.  Please watch for signs and symptoms of infection which may include but are not limited to: increased pain or tenderness around your incision,  warmth at the site or fever, redness or red streaking, and foul smelling or appearing drainage.  Clear drainage and bloody drainage are not worrisome.  If your incision opens up please contact our office.    Instructions and post-operative restrictions verbally reviewed -- patient verbalized understanding.    Follow Up:   Daughter will call to schedule follow-up.  Or sooner for any further concerns or worsening sign and symptoms.  Patient encouraged to call the office with any questions or concerns.     Thank you for allowing me to participate in your care.  Best Regards,    INDIGO Dunbar  [facility] Cardiothoracic Surgery  06/08/23  14:34 EDT

## 2023-06-14 ENCOUNTER — HOSPITAL ENCOUNTER (OUTPATIENT)
Dept: PHYSICAL THERAPY | Facility: HOSPITAL | Age: 83
Setting detail: THERAPIES SERIES
Discharge: HOME | End: 2023-06-14
Payer: MEDICARE

## 2023-06-14 DIAGNOSIS — S81.802A MULTIPLE OPEN WOUNDS OF LEFT LOWER LEG: ICD-10-CM

## 2023-06-14 DIAGNOSIS — S91.301A NON-HEALING OPEN WOUND OF RIGHT HEEL: ICD-10-CM

## 2023-06-14 DIAGNOSIS — I73.9 PERIPHERAL VASCULAR DISEASE OF LOWER EXTREMITY: Primary | ICD-10-CM

## 2023-06-14 DIAGNOSIS — S81.801A MULTIPLE OPEN WOUNDS OF RIGHT LOWER LEG: ICD-10-CM

## 2023-06-14 DIAGNOSIS — S91.302A NON-HEALING OPEN WOUND OF LEFT HEEL: ICD-10-CM

## 2023-06-14 PROCEDURE — 97597 DBRDMT OPN WND 1ST 20 CM/<: CPT

## 2023-06-14 PROCEDURE — 97598 DBRDMT OPN WND ADDL 20CM/<: CPT

## 2023-06-15 NOTE — THERAPY WOUND CARE TREATMENT
Outpatient Rehabilitation - Wound/Debridement Treatment Note  [facility]     Patient Name: Susan Anderson  : 1940  MRN: [id number]  Today's Date: 6/15/2023             R heel      L heel      L knee      L proximal skin tear      Admit Date: 2023    Visit Dx:    ICD-10-CM ICD-9-CM   1. Peripheral vascular disease of lower extremity  I73.9 443.9   2. Multiple open wounds of right lower leg  S81.801A 894.0   3. Multiple open wounds of left lower leg  S81.802A 894.0   4. Non-healing open wound of right heel  S91.301A 892.1   5. Non-healing open wound of left heel  S91.302A 892.1       Patient Active Problem List   Diagnosis    Diabetic foot ulcer    PVD (peripheral vascular disease)    Osteomyelitis    Diabetes mellitus with neuropathy    Essential hypertension    Stage 3a chronic kidney disease    Acute on chronic systolic CHF (congestive heart failure)    Mitral valve disease    NICM (nonischemic cardiomyopathy)    CAD    Dyslipidemia    Chronic combined systolic and diastolic heart failure     Lumbar stenosis with neurogenic claudication    Spondylolisthesis, lumbar region    Gait disturbance    Anemia    Sleep apnea    Paroxysmal atrial fibrillation    T2DM     Iron deficiency anemia, unspecified    Cellulitis of right foot due to methicillin-resistant Staphylococcus aureus    Acute respiratory failure with hypoxia and hypercarbia    Severe malnutrition        Past Medical History:   Diagnosis Date    Anemia     Anxiety     Arthritis     Asthma  - double pneumonia    Currently on inhaler and nebulizer    Atrial fibrillation 2022    Brain tumor     Benign and followed at     Cancer     cervical cancer, skin cancer    CHF (congestive heart failure) 2021    Chronic kidney disease Related to diabetes    Clotting disorder 10/22    Upper GI bleed from blood thinners aggravate ulcers    Coronary artery disease 2021 DX for hear failure    COVID-19 2023    Depression      Diabetes mellitus 30 years    Seeing Dr. Lam 1st time Aug 19    Dizziness 07/27/2022    Effusion of right knee 08/12/2022    Fall 07/27/2022    GERD (gastroesophageal reflux disease)     Gout     History of medical problems 8/22    AFIB    History of staph infection 10/2021    right toe    History of transfusion     no reactions, 1 unit, [facility]    HL (hearing loss)     Acoustic neuroma right    Hx of colonoscopy     Hyperlipidemia Reference current labs x 2-3yrs approx    Hypertension 30 years    Hypomagnesemia 10/06/2022    Insomnia     Low back pain     Migraine     Mitral valve disease     Mitral valve disease     Osteomyelitis     Peptic ulceration 10/22    Secondary to blood thinners    Peripheral neuropathy     Physical deconditioning 05/17/2022    Pneumonia due to infectious organism 06/04/2021    Pressure injury of skin of sacral region 08/21/2022    Renal insufficiency 2021    Sepsis 01/28/2023    Sleep apnea     Syncope, unspecified syncope type 10/06/2022    Type 2 diabetes mellitus     30 years    Weakness 07/27/2022        Past Surgical History:   Procedure Laterality Date    ABDOMINAL HYSTERECTOMY W/SALPINGECTOMY      AMPUTATION  Right great toe, 1st 1/3 metatarsal -[place] 4/14/21    AORTAGRAM N/A 04/09/2021    Procedure: AORTAGRAM WITH OR WITHOUT RUNOFFS, WITH Co2;  Surgeon: Trey Forrest MD;  Location:  [facility];  Service: Vascular;  Laterality: N/A;    AORTAGRAM N/A 09/28/2022    Procedure: CO2 ANGIOGRAM, LEFT SFA ANGIOPLASTY WITH DRUG ELUTING BALLOON, LEFT SFA STENT PLACEMENT ;  Surgeon: Trey Forrest MD;  Location:  [facility];  Service: Vascular;  Laterality: N/A;  FLUORO: 13.12  DOSE 162mGy  CONTRAST: Isovue 350: 15ml    APPENDECTOMY      BRAIN TUMOR EXCISION      laser surgery     CARDIAC CATHETERIZATION N/A 06/21/2021    Procedure: LEFT HEART CATH;  Surgeon: Anjum Ceballos MD;  Location:  [facility] CATH INVASIVE LOCATION;  Service: Cardiology;  Laterality: N/A;    CATARACT  EXTRACTION W/ INTRAOCULAR LENS  IMPLANT, BILATERAL      CHOLECYSTECTOMY      COLONOSCOPY      ENDOSCOPY N/A 10/07/2022    Procedure: ESOPHAGOGASTRODUODENOSCOPY;  Surgeon: Stef Veras MD;  Location:  [facility];  Service: Gastroenterology;  Laterality: N/A;    EYE SURGERY      FEMORAL ARTERY STENT  10/2022    HYSTERECTOMY      INTERVENTIONAL RADIOLOGY PROCEDURE N/A 1/15/2023    Procedure: CV INTERVENTIONAL RADIOLOGY PROCEDURE;  Surgeon: Sanket Zapata MD;  Location:  [facility];  Service: Interventional Radiology;  Laterality: N/A;    TOE SURGERY      TRANS METATARSAL AMPUTATION Right 04/14/2021    Procedure: AMPUTATION TRANS METATARSAL RIGHT GREAT TOE;  Surgeon: Valdez Finney MD;  Location:  [facility];  Service: Vascular;  Laterality: Right;         EVALUATION   PT Ortho       Row Name 06/14/23 1500       Subjective Comments    Subjective Comments No complaints or changes. Per her dtr, the periwounds were looking moist at her dressing change at the [facility] office, so she switched to HFB for that dressing change, which seemed to help, so she switched back to the xeroform.  -MC       Subjective Pain    Able to rate subjective pain? yes  -MC    Pre-Treatment Pain Level 0  -MC    Post-Treatment Pain Level 4  -MC    Subjective Pain Comment notable pain with R heel and R knee debridement  -MC       Transfers    Comment, (Transfers) seated in electric w/c for tx  -MC              User Key  (r) = Recorded By, (t) = Taken By, (c) = Cosigned By      Initials Name Provider Type    Lucila Mohan PT Physical Therapist                     [facility]DA Wound       Row Name 06/14/23 1500             [REMOVED] Wound 03/15/23 1115 Left lateral foot Traumatic    Wound - Properties Group Placement Date: 03/15/23  -MC Placement Time: 1115  -MC Present on Hospital Admission: N  -MC Side: Left  -MC Orientation: lateral  - Location: foot  - Primary Wound Type: Traumatic  -MC Removal Date: 06/14/23  -MC Removal  Time: 1400  -MC Wound Outcome: Healed  -MC    Retired Wound - Properties Group Placement Date: 03/15/23  -MC Placement Time: 1115  -MC Present on Hospital Admission: N  -MC Side: Left  -MC Orientation: lateral  -MC Location: foot  -MC Primary Wound Type: Traumatic  -MC Removal Date: 06/14/23  -MC Removal Time: 1400  -MC Wound Outcome: Healed  -MC    Retired Wound - Properties Group Date first assessed: 03/15/23  -MC Time first assessed: 1115  -MC Present on Hospital Admission: N  -MC Side: Left  -MC Location: foot  -MC Primary Wound Type: Traumatic  -MC Resolution Date: 06/14/23  -MC Resolution Time: 1400  -MC Wound Outcome: Healed  -MC       [REMOVED] Wound 03/15/23 1115 Left proximal leg Skin Tear    Wound - Properties Group Placement Date: 03/15/23  -MC Placement Time: 1115  -MC Present on Hospital Admission: Y  -MC Side: Left  -MC Orientation: proximal  -MC Location: leg  -MC Primary Wound Type: Skin tear  -MC Removal Date: 06/14/23  -MC Removal Time: 1400  -MC Wound Outcome: Healed  -MC    Retired Wound - Properties Group Placement Date: 03/15/23  -MC Placement Time: 1115  -MC Present on Hospital Admission: Y  -MC Side: Left  -MC Orientation: proximal  -MC Location: leg  -MC Primary Wound Type: Skin tear  -MC Removal Date: 06/14/23  -MC Removal Time: 1400  -MC Wound Outcome: Healed  -MC    Retired Wound - Properties Group Date first assessed: 03/15/23  -MC Time first assessed: 1115  -MC Present on Hospital Admission: Y  -MC Side: Left  -MC Location: leg  -MC Primary Wound Type: Skin tear  -MC Resolution Date: 06/14/23  -MC Resolution Time: 1400  -MC Wound Outcome: Healed  -MC       Wound 06/06/23 Left lower leg Skin Tear    Wound - Properties Group Placement Date: 06/06/23  -LH Present on Hospital Admission: Y  -LH Side: Left  -LH Orientation: lower  -LH Location: leg  -LH Primary Wound Type: Skin tear  -LH    Wound Image Images linked: 2  -MC      Dressing Appearance intact;no drainage  -MC      Base  "moist;pink;red;epithelialization  very small open area remaining to the proximal/lateral leg and the L knee, the remainder of the skin tears appear closed.  -MC      Periwound swelling;warm;ecchymotic  -MC      Periwound Temperature warm  -MC      Periwound Skin Turgor soft  -MC      Edges open  -MC      Drainage Characteristics/Odor sanguineous;serosanguineous  -MC      Drainage Amount scant  -MC      Care, Wound cleansed with;wound cleanser;debrided  -MC      Dressing Care dressing applied;petroleum-based;gauze;low-adherent;foam;border dressing  honey/2\" optifoam to L knee. xeroform/4\" optifoam to l lateral/proximal skin tear  -MC      Periwound Care cleansed with pH balanced cleanser;dry periwound area maintained  -      Retired Wound - Properties Group Placement Date: 06/06/23  - Present on Hospital Admission: Y  -LH Side: Left  - Orientation: lower  - Location: leg  -LH Primary Wound Type: Skin tear  -LH    Retired Wound - Properties Group Date first assessed: 06/06/23  - Present on Hospital Admission: Y  - Side: Left  -LH Location: leg  -LH Primary Wound Type: Skin tear  -LH       Wound 04/11/23 1300 Right anterior knee Skin Tear    Wound - Properties Group Placement Date: 04/11/23  - Placement Time: 1300  -MC Present on Hospital Admission: N  -MC Side: Right  - Orientation: anterior  - Location: knee  -MC Primary Wound Type: Skin tear  -MC    Dressing Appearance intact;moist drainage  -MC      Base moist;pink;red  -MC      Periwound intact;dry;ecchymotic  -      Periwound Temperature warm  -      Periwound Skin Turgor soft  -MC      Edges irregular;open  -MC      Drainage Characteristics/Odor sanguineous  -MC      Drainage Amount small  -MC      Care, Wound cleansed with;wound cleanser;debrided  -MC      Dressing Care dressing applied;petroleum-based;gauze;low-adherent;foam;border dressing  xeroform, 4\" optifoam, PF tape  -MC      Periwound Care cleansed with pH balanced cleanser;dry " periwound area maintained  -      Retired Wound - Properties Group Placement Date: 04/11/23  - Placement Time: 1300  -MC Present on Hospital Admission: N  -MC Side: Right  -MC Orientation: anterior  -MC Location: knee  -MC Primary Wound Type: Skin tear  -MC    Retired Wound - Properties Group Date first assessed: 04/11/23  - Time first assessed: 1300  -MC Present on Hospital Admission: N  -MC Side: Right  -MC Location: knee  -MC Primary Wound Type: Skin tear  -MC       Wound 04/11/23 1300 Right lateral fourth toe Soft Tissue Necrosis    Wound - Properties Group Placement Date: 04/11/23  - Placement Time: 1300  -MC Present on Hospital Admission: N  -MC Side: Right  -MC Orientation: lateral  -MC Location: fourth toe  -MC Primary Wound Type: Soft tissue  -MC    Dressing Appearance open to air  -      Base dry;scab;black eschar  -      Periwound intact;dry  -      Periwound Temperature warm  -      Periwound Skin Turgor soft  -MC      Edges irregular;open  -MC      Drainage Amount none  -      Care, Wound cleansed with;wound cleanser  betadine  -      Dressing Care open to air  -MC      Retired Wound - Properties Group Placement Date: 04/11/23  - Placement Time: 1300  -MC Present on Hospital Admission: N  -MC Side: Right  -MC Orientation: lateral  -MC Location: fourth toe  -MC Primary Wound Type: Soft tissue  -MC    Retired Wound - Properties Group Date first assessed: 04/11/23  - Time first assessed: 1300  -MC Present on Hospital Admission: N  -MC Side: Right  - Location: fourth toe  -MC Primary Wound Type: Soft tissue  -MC       Wound 03/31/23 1345 Right medial heel Fissure    Wound - Properties Group Placement Date: 03/31/23  - Placement Time: 1345  -JM Side: Right  -JM Orientation: medial  -JM Location: heel  -JM Primary Wound Type: Fissure  -JM    Wound Image Images linked: 1  -MC      Dressing Appearance intact;moist drainage  -MC      Base moist;yellow;slough;non-granulating  scant  "grey/black remaining. Base is mostly spongy yellow slough  -      Periwound intact;redness;blanchable;warm;pale white;moist;macerated  -      Periwound Temperature warm  -MC      Periwound Skin Turgor soft  -MC      Edges irregular;open  -MC      Wound Length (cm) 2.2 cm  -MC      Wound Width (cm) 3.8 cm  -MC      Wound Depth (cm) --  obscured  -      Wound Surface Area (cm^2) 8.36 cm^2  -      Drainage Characteristics/Odor serosanguineous;yellow;brown;malodorous;other (see comments)  mild malodor  -      Drainage Amount small  -      Care, Wound cleansed with;wound cleanser;debrided;honey applied  -      Dressing Care dressing applied;antimicrobial agent applied;foam;low-adherent;border dressing  honey, HFBt, 4\" optifoam  -      Periwound Care cleansed with pH balanced cleanser;dry periwound area maintained;barrier ointment applied  zguard  -      Retired Wound - Properties Group Placement Date: 03/31/23  - Placement Time: 1345  - Side: Right  - Orientation: medial  - Location: heel  -JM Primary Wound Type: Fissure  -JM    Retired Wound - Properties Group Date first assessed: 03/31/23  - Time first assessed: 1345  - Side: Right  - Location: heel  -JM Primary Wound Type: Fissure  -JM       Wound 01/28/23 1613 Left posterior heel Pressure Injury    Wound - Properties Group Placement Date: 01/28/23  -WR Placement Time: 1613  -WR Present on Hospital Admission: Y  -WR Side: Left  -WR Orientation: posterior  -WR Location: heel  -WR Primary Wound Type: Pressure inj  -WR    Wound Image Images linked: 1  -MC      Dressing Appearance intact;moist drainage  -      Base moist;necrotic;yellow;slough;non-granulating  moist fibrous slough  -      Periwound intact;blanchable;pink;macerated;moist;pale white  -      Periwound Temperature warm  -      Periwound Skin Turgor soft  -MC      Edges open  -MC      Wound Length (cm) 2.8 cm  -MC      Wound Width (cm) 3.5 cm  -MC      Wound Depth (cm) " "--  obscured  -      Wound Surface Area (cm^2) 9.8 cm^2  -      Drainage Characteristics/Odor serosanguineous;yellow;malodorous;tan  -      Drainage Amount small  -      Care, Wound cleansed with;wound cleanser;debrided;honey applied  -      Dressing Care dressing applied;antimicrobial agent applied;foam;low-adherent;border dressing  honey, HFBt, 4\" optifoam  -      Periwound Care cleansed with pH balanced cleanser;dry periwound area maintained;barrier ointment applied  zguard  -      Retired Wound - Properties Group Placement Date: 01/28/23  -WR Placement Time: 1613  -WR Present on Hospital Admission: Y  -WR Side: Left  -WR Orientation: posterior  -WR Location: heel  -WR Primary Wound Type: Pressure inj  -WR    Retired Wound - Properties Group Date first assessed: 01/28/23  -WR Time first assessed: 1613  -WR Present on Hospital Admission: Y  -WR Side: Left  -WR Location: heel  -WR Primary Wound Type: Pressure inj  -WR              User Key  (r) = Recorded By, (t) = Taken By, (c) = Cosigned By      Initials Name Provider Type    Lucila Mohan, PT Physical Therapist    Shi Mane, PT Physical Therapist    Aditi Shafer, RN Registered Nurse    López Fuller, PT Physical Therapist                      WOUND DEBRIDEMENT  Total area of Debridement: 25 cm2  Debridement Site 1  Location- Site 1: L heel  Selective Debridement- Site 1: Wound Surface <20cmsq  Instruments- Site 1: #15, scapel, tweezers  Excised Tissue Description- Site 1: moderate, slough, necrotic, eschar  Bleeding- Site 1: none   Debridement Site 2  Location- Site 2: R heel  Selective Debridement- Site 2: Wound Surface <20cmsq  Instruments- Site 2: tweezers, #15, scapel  Excised Tissue Description- Site 2: moderate, slough, eschar, necrotic  Bleeding- Site 2: none   Debridement Site 3  Location- Site 3: BLE skin tears, scabbing  Selective Debridement- Site 3: Wound Surface <20cmsq  Instruments- Site 3: tweezers, " #15, scapel, scissors  Excised Tissue Description- Site 3: maximum, slough, other (comment) (adherent crusts, scabbing)  Bleeding- Site 3: scant, held pressure, 1 minute      Therapy Education       Row Name 06/14/23 1500             Therapy Education    Education Details xeroform/optifoams to the R knee and L lateral skin tear. Honey/optifoam to L knee. Honey, HFBt, and optifoams to the B heel wounds, with thin ring of zguard around the edges.  -      Given Symptoms/condition management;Bandaging/dressing change  -      Program Progressed  -      How Provided Verbal;Demonstration  -      Provided to Patient  -      Level of Understanding Verbalized;Teach back education performed  -                User Key  (r) = Recorded By, (t) = Taken By, (c) = Cosigned By      Initials Name Provider Type    Lucila Mohan, PT Physical Therapist                    Recommendation and Plan   PT Assessment/Plan       Row Name 06/14/23 1500          PT Assessment    Functional Limitations Performance in self-care ADL;Impaired gait;Other (comment)  wound management limitations  -     Impairments Integumentary integrity;Pain;Impaired sensory integrity  -     Assessment Comments Pt with closure of many LE tears since last assessment. PT anticipates the L knee and L lateral leg areas will close within the week. Adjusted B heel dressings to HFBt with zguard to the periwounds. PT able to debride additional tissue today, but some debridement limited by pain. Pt will continue to benefit from skilled PT wound care to promote healing.  -     Rehab Potential Fair  -     Patient/caregiver participated in establishment of treatment plan and goals Yes  -     Patient would benefit from skilled therapy intervention Yes  -        PT Plan    PT Frequency 2x/week  -     Physical Therapy Interventions (Optional Details) wound care;patient/family education  -     PT Plan Comments debridement, dressings  -                User Key  (r) = Recorded By, (t) = Taken By, (c) = Cosigned By      Initials Name Provider Type     Lucila Ulrich, PT Physical Therapist                    Goals   PT OP Goals       Row Name 06/14/23 1559          Time Calculation    PT Goal Re-Cert Due Date 09/04/23  -               User Key  (r) = Recorded By, (t) = Taken By, (c) = Cosigned By      Initials Name Provider Type     Lucila Ulrich, PT Physical Therapist                    PT Goal Re-Cert Due Date: 09/04/23            Time Calculation: Start Time: 1400  Untimed Charges  Wound Care: 86709 Selective debridement, 62249 Add't debridement ea 20 cm  14452-Lvcgjqxjt debridement: 20  67707-Rmn't debridement ea 20 cm: 10  Total Minutes  Untimed Charges Total Minutes: 30   Total Minutes: 30  Therapy Charges for Today       Code Description Service Date Service Provider Modifiers Qty    74092626261 HC PIPE DEBRIDE OPEN WOUND UP TO 20CM 6/14/2023 Lucila Ulrich, PT GP 1    70926970447 HC PT PIPE DEBRIDE OPEN WOUND EA ADD 20CM 6/14/2023 Lucila Ulrich, PT GP 1                    Lucila Ulrich, PT  6/15/2023

## 2023-06-20 PROBLEM — N18.4 CKD (CHRONIC KIDNEY DISEASE), STAGE IV: Status: ACTIVE | Noted: 2023-06-20

## 2023-06-21 PROBLEM — L97.509 FOOT ULCER: Status: ACTIVE | Noted: 2023-06-21

## 2023-06-23 NOTE — TELEPHONE ENCOUNTER
Caller: GREG ELLIS    Relationship: Emergency Contact    Best call back number: [phone]     What is the best time to reach you: ANY    Who are you requesting to speak with (clinical staff, provider,  specific staff member): ANGELA    Do you know the name of the person who called: GREG ELLIS     What was the call regarding: PT WOULD LIKE TO KNOW IF AT ALL POSSIBLE TO CHANGE APPT TO AFTER 11:30 SO SHE CAN ATTEND-SHE HAS TO WORK UNTIL 11    Is it okay if the provider responds through MyChart: YES

## 2023-07-25 PROCEDURE — 87086 URINE CULTURE/COLONY COUNT: CPT

## 2023-07-25 PROCEDURE — 87077 CULTURE AEROBIC IDENTIFY: CPT

## 2023-07-25 PROCEDURE — 87186 SC STD MICRODIL/AGAR DIL: CPT

## 2023-08-14 RX ORDER — ATORVASTATIN CALCIUM 40 MG/1
40 TABLET, FILM COATED ORAL DAILY
Qty: 30 TABLET | Refills: 6 | Status: SHIPPED | OUTPATIENT
Start: 2023-08-14

## 2023-08-14 RX ORDER — APIXABAN 2.5 MG/1
TABLET, FILM COATED ORAL
Qty: 60 TABLET | Refills: 6 | Status: SHIPPED | OUTPATIENT
Start: 2023-08-14

## 2023-08-21 NOTE — TELEPHONE ENCOUNTER
SOCORRO WITH [facility] CALLED THE OFFICE AND ASKED IF JONNATHAN WOULD BE THE ATTENDING PROVIDER FOR HOSPICE CARE AND INFORMED THEM JONNATHAN WOULD NOT AND WOULD PREFER THE HOSPICE PROVIDER BE THE ATTENDING PROVIDER, CONFIRMED WITH JONNATHAN. MILA

## 2023-08-22 NOTE — TELEPHONE ENCOUNTER
I did speak with Dr. Colvin today about patient a bit further and he really does not have any new recommendations at this time but would be glad to discuss with pt and daughter any time in office.  She does have Hospice coming and they will offer further care and assistance.  She will call if there are further questions or concerns.

## 2023-08-22 NOTE — TELEPHONE ENCOUNTER
"----- Message from Lillie Ayala MA sent at 8/22/2023  7:58 AM EDT -----  Regarding: FW: Input needed - non curative care for dry gangrene  Contact: [phone]  I spoke to daughter today and she is still very concerned about \"what to expect\".  She has decided on Hospice care and they will be coming soon     ----- Message -----  From: Billy Colvin MD  Sent: 8/21/2023   9:00 PM EDT  To: Julissa Bourne, Tamika Crain, #  Subject: FW: Input needed - non curative care for dry#    Hi team, can you please arrange for this patient to see me in clinic at her earliest convenience if she would like.  She should also be scheduled to see wound care if she has not recently seen that team.  Thank you very much  ----- Message -----  From: Lillie Ayala MA  Sent: 8/21/2023   8:34 AM EDT  To: Billy Colvin MD  Subject: FW: Input needed - non curative care for dry#    Please review message and advise.    Thank you - Lillie       ----- Message -----  From: Susan Anderson  Sent: 8/21/2023   7:51 AM EDT  To: [facility]  Subject: Input needed - non curative care for dry holley#    Dr. 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 Both you and Dr. Finney were kind enough to  my Mom and me about the fact she is not a candidate for surgical intervention and the wounds can only be palliatively managed - with povidone iodine and antibiotics. We discharged on palliative knowing we are on the cusp of hospice.     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     I'd like your input with dry gangrene management in her right foot toes (her great right toe was amputated 4/2021, HOMER scores were virtually nonexistent then due to PVD/diabetes and cannot be improved. A blister had formed last week on her small toe; managed with iodine as well; did/does not appear infected and turned into what I thought " was an eschar but over the weekend has turned black and now appears to be dry gangrene. Can you guide me on management during his end of life phase and what to expect as this progresses? This is a unique situation for us particularly since we no there is no curative measure to be taken.     She is doing well otherwise. Alertx4, vitals normal. Pain is managed through palliative care.     Greatly appreciate your thoughts.     Katie Anderson-Clinton daughter for Susan Anderson  Cell [phone]

## 2023-09-26 NOTE — TELEPHONE ENCOUNTER
Caller: [facility]    Relationship: Home Health/ [facility] call back number: [phone]    What is the medical concern/diagnosis: WOUND ON LEFT HEEL    What specialty or service is being requested: WOUND CLINIC    What is the provider, practice or medical service name: [facility]    What is the office location:     What is the office phone number:     Any additional details:          greater than 10 METS

## 2024-10-29 NOTE — TELEPHONE ENCOUNTER
CERTIFICATE OF WORK       October 29, 2024      Re: Yamileth ENNIS Pollen  [address] [facility] [address]      This is to certify that Yamileth Vazquez has been under my care from 10/28 and 10/29/2024.                Sincerely,          Nicolas Oakes MD  [facility] [address]  [address]  [facility] [address]  Phone: [phone]  Fax: [phone]     Susan AKBAR [address] [place] [address]  1940        Patient will be having a EGD by Dr. Tommy Valencia on January 3, 2023. The patient is needing cardiac clearance due to being on blood thinners. Please complete the following and fax back to the office.     Patient's was last seen in the office on:_____________________    Procedure/Test Performed:    _____ Stress Test       _____ Echocardiogram    _____ EKG     _____ Heart Cath    Based on the above test results and/or clinical evaluation it is my recommendation:    ____ Patient may proceed with the scheduled surgical procedure with an acceptable cardiac risk.    ____ Patient CAN NOT stop Plavix, Effient, Ticlid, Aspirin, Coumadin, Pradaxa, Xarelto, Eliquis, Savaysa, or Brilinta prior to procedure.     ____ Patient CAN stop Plavix, Effient, Ticlid, Aspirin, Coumadin, Pradaxa, Xarelto, Eliquis, Savaysa, or Brilinta  ______ days prior to procedure.    Please sign and date below.    Signature________________________________________   Date:_________________     Thank You    Mark I. Brunner, M.D.  SANTIAGO Estevez M.D.  SANTIAGO Ibarra M.D.  Delaney Prado, INDIGO Zelaya, MAY Ochoa

## (undated) DEVICE — CATH DIAG EXPO .045 FL3  5F 100CM

## (undated) DEVICE — ANTIBACTERIAL UNDYED BRAIDED (POLYGLACTIN 910), SYNTHETIC ABSORBABLE SUTURE: Brand: COATED VICRYL

## (undated) DEVICE — SUT ETHLN 4/0 PS2 18IN 1667H

## (undated) DEVICE — INFLATION DEVICE: Brand: ENCORE™ 26

## (undated) DEVICE — PK EXTREM LOWR 10

## (undated) DEVICE — THIN OFFSET (9.0 X 0.38 X 25.0MM)

## (undated) DEVICE — MODEL BT2000 P/N 700287-012KIT CONTENTS: MANIFOLD WITH SALINE AND CONTRAST PORTS, SALINE TUBING WITH SPIKE AND HAND SYRINGE, TRANSDUCER: Brand: BT2000 AUTOMATED MANIFOLD KIT

## (undated) DEVICE — GW INQWIRE FC PTFE STD J/1.5 .035 260

## (undated) DEVICE — SOL IRR H2O BTL 1000ML STRL

## (undated) DEVICE — FLUSH KIT W/3-WAY "ON" STOPCOCK: Brand: ICU MEDICAL

## (undated) DEVICE — INTRO SHEATH PRELUDE IDEAL SPRNG COIL 021 6F 23X80CM

## (undated) DEVICE — SPNG GZ WOVN 4X4IN 12PLY 10/BX STRL

## (undated) DEVICE — SINGLE-USE BIOPSY FORCEPS: Brand: RADIAL JAW 4

## (undated) DEVICE — BANDAGE,GAUZE,BULKEE II,4.5"X4.1YD,STRL: Brand: MEDLINE

## (undated) DEVICE — 3M™ STERI-DRAPE™ ISOLATION BAG, 10 PER CARTON / 4 CARTONS PER CASE, 1003: Brand: 3M™ STERI-DRAPE™

## (undated) DEVICE — SYR LUERLOK 50ML

## (undated) DEVICE — INTRO ACCSR BLNT TP

## (undated) DEVICE — GLIDEX™ COATED HYDROPHILIC GUIDEWIRE: Brand: MAGIC TORQUE™

## (undated) DEVICE — Device

## (undated) DEVICE — PATIENT RETURN ELECTRODE, SINGLE-USE, CONTACT QUALITY MONITORING, ADULT, WITH 9FT CORD, FOR PATIENTS WEIGING OVER 33LBS. (15KG): Brand: MEGADYNE

## (undated) DEVICE — GLV SURG PREMIERPRO MIC LTX PF SZ7 BRN

## (undated) DEVICE — CATH SUP PROC TRAILBLAZER .035IN 150CM

## (undated) DEVICE — GOWN,NON-REINFORCED,SIRUS,SET IN SLV,XL: Brand: MEDLINE

## (undated) DEVICE — LIMB HOLDER, WRIST/ANKLE: Brand: DEROYAL

## (undated) DEVICE — CATH ANGIO SOFT/VU SELECTIVE RIM .035IN 4F 65CM

## (undated) DEVICE — HANDPIECE SET WITH HIGH FLOW TIP AND SUCTION TUBE: Brand: INTERPULSE

## (undated) DEVICE — CONTN GRAD MEAS TRIANG 32OZ BLK

## (undated) DEVICE — SUT ETHLN 3/0 PC5 18IN 1893G

## (undated) DEVICE — DEV COMP RAD PRELUDESYNC 24CM

## (undated) DEVICE — SI AVANTI+ 6F STD W/GW  NO OBT: Brand: AVANTI

## (undated) DEVICE — TBG INJ CONTRL EXCITE RA 1200PSI 48IN

## (undated) DEVICE — SPNG VERSALON 4X4 4PLY NONSTRL LF BG/200

## (undated) DEVICE — GLV SURG BIOGELULTRATOUCH POLYISPRN PF LF SZ7 STRL

## (undated) DEVICE — MODEL AT P65, P/N 701554-001KIT CONTENTS: HAND CONTROLLER, 3-WAY HIGH-PRESSURE STOPCOCK WITH ROTATING END AND PREMIUM HIGH-PRESSURE TUBING: Brand: ANGIOTOUCH® KIT

## (undated) DEVICE — KT ORCA ORCAPOD DISP STRL

## (undated) DEVICE — RADIFOCUS GLIDECATH: Brand: GLIDECATH

## (undated) DEVICE — TUBING, SUCTION, 1/4" X 10', STRAIGHT: Brand: MEDLINE

## (undated) DEVICE — THE BITE BLOCK MAXI, LATEX FREE STRAP IS USED TO PROTECT THE ENDOSCOPE INSERTION TUBE FROM BEING BITTEN BY THE PATIENT.

## (undated) DEVICE — POOLE SUCTION INSTRUMENT WITH REMOVABLE SHEATH: Brand: POOLE

## (undated) DEVICE — HYBRID CO2 TUBING/CAP SET FOR OLYMPUS® SCOPES & CO2 SOURCE: Brand: ERBE

## (undated) DEVICE — CATH DIAG EXPO M/ PK 5F FL4/FR4 PIG

## (undated) DEVICE — PK ANGIO OR 10

## (undated) DEVICE — CATH GUIDE SOFTVU FLUSH HT PIG .035 4F 65CM

## (undated) DEVICE — LUBE GEL ENDOGLIDE 1.1OZ

## (undated) DEVICE — RADIFOCUS GLIDEWIRE: Brand: GLIDEWIRE

## (undated) DEVICE — DRSNG GZ PETROLTM XEROFORM CURAD 4X4IN STRL

## (undated) DEVICE — GW MAGICTORQUE .035 260CM

## (undated) DEVICE — SUT MNCRYL 4/0 PS2 18 IN

## (undated) DEVICE — SKIN PREP TRAY W/CHG: Brand: MEDLINE INDUSTRIES, INC.

## (undated) DEVICE — 3M™ TEGADERM™ CHG DRESSING 25/CARTON 4 CARTONS/CASE 1659: Brand: TEGADERM™

## (undated) DEVICE — ST ACC MICROPUNCTURE .018 TRANSLSS/SS/TP 5F/10CM 21G/7CM

## (undated) DEVICE — AVANTI + 4F STD W/GW: Brand: AVANTI

## (undated) DEVICE — ULTRAVERSE 035 PTA DILATATION CATHETER 3.0MM X 20MM BALLOON, 130CM SHAFT: Brand: ULTRAVERSE® 035 PTA DILATATION CATHETER

## (undated) DEVICE — DRAPE,TOP,102X53,STERILE: Brand: MEDLINE

## (undated) DEVICE — PK CATH CARD 10

## (undated) DEVICE — ELECTRD BLD EZ CLN MOD XLNG 2.75IN

## (undated) DEVICE — DESTINATION PERIPHERAL GUIDING SHEATH: Brand: DESTINATION

## (undated) DEVICE — PENCL ROCKRSWCH MEGADYNE W/HOLSTR 10FT SS

## (undated) DEVICE — RADIFOCUS TORQUE DEVICE MULTI-TORQUE VISE: Brand: RADIFOCUS TORQUE DEVICE

## (undated) DEVICE — FIAPC® PROBE W/ FILTER 3000 A OD 2.3MM/6.9FR; L 3M/9.8FT: Brand: ERBE

## (undated) DEVICE — CVR PROB ULTRASND/TRANSD W/GEL 7X11IN STRL

## (undated) DEVICE — CONTAINER,SPECIMEN,OR STERILE,4OZ: Brand: MEDLINE

## (undated) DEVICE — SAFELINER SUCTION CANISTER 1000CC: Brand: DEROYAL